# Patient Record
Sex: MALE | Race: WHITE | NOT HISPANIC OR LATINO | Employment: OTHER | URBAN - NONMETROPOLITAN AREA
[De-identification: names, ages, dates, MRNs, and addresses within clinical notes are randomized per-mention and may not be internally consistent; named-entity substitution may affect disease eponyms.]

---

## 2017-04-25 ENCOUNTER — HOSPITAL ENCOUNTER (INPATIENT)
Facility: HOSPITAL | Age: 42
LOS: 4 days | Discharge: HOME HEALTH | End: 2017-04-29
Attending: EMERGENCY MEDICINE | Admitting: HOSPITALIST

## 2017-04-25 DIAGNOSIS — L89.94: Primary | ICD-10-CM

## 2017-04-25 DIAGNOSIS — L08.9 INFECTED DECUBITUS ULCER, STAGE III (HCC): ICD-10-CM

## 2017-04-25 DIAGNOSIS — L89.93 INFECTED DECUBITUS ULCER, STAGE III (HCC): ICD-10-CM

## 2017-04-25 PROBLEM — L89.90 DECUBITUS SKIN ULCER: Status: ACTIVE | Noted: 2017-04-25

## 2017-04-25 PROBLEM — L89.90 INFECTED DECUBITUS ULCER: Status: ACTIVE | Noted: 2017-04-25

## 2017-04-25 LAB
ALBUMIN SERPL-MCNC: 4.2 G/DL (ref 3.5–5)
ALBUMIN/GLOB SERPL: 1 G/DL (ref 1.5–2.5)
ALP SERPL-CCNC: 62 U/L (ref 40–129)
ALT SERPL W P-5'-P-CCNC: 40 U/L (ref 10–44)
ANION GAP SERPL CALCULATED.3IONS-SCNC: 8.7 MMOL/L (ref 3.6–11.2)
AST SERPL-CCNC: 33 U/L (ref 10–34)
BASOPHILS # BLD AUTO: 0.03 10*3/MM3 (ref 0–0.3)
BASOPHILS NFR BLD AUTO: 0.3 % (ref 0–2)
BILIRUB SERPL-MCNC: 0.2 MG/DL (ref 0.2–1.8)
BUN BLD-MCNC: 23 MG/DL (ref 7–21)
BUN/CREAT SERPL: 31.9 (ref 7–25)
CALCIUM SPEC-SCNC: 9.6 MG/DL (ref 7.7–10)
CHLORIDE SERPL-SCNC: 112 MMOL/L (ref 99–112)
CO2 SERPL-SCNC: 23.3 MMOL/L (ref 24.3–31.9)
CREAT BLD-MCNC: 0.72 MG/DL (ref 0.43–1.29)
CRP SERPL-MCNC: 3.36 MG/DL (ref 0–0.99)
DEPRECATED RDW RBC AUTO: 45 FL (ref 37–54)
EOSINOPHIL # BLD AUTO: 0.26 10*3/MM3 (ref 0–0.7)
EOSINOPHIL NFR BLD AUTO: 3 % (ref 0–5)
ERYTHROCYTE [DISTWIDTH] IN BLOOD BY AUTOMATED COUNT: 13.3 % (ref 11.5–14.5)
ERYTHROCYTE [SEDIMENTATION RATE] IN BLOOD: 62 MM/HR (ref 0–15)
GFR SERPL CREATININE-BSD FRML MDRD: 122 ML/MIN/1.73
GLOBULIN UR ELPH-MCNC: 4.1 GM/DL
GLUCOSE BLD-MCNC: 97 MG/DL (ref 70–110)
GLUCOSE BLDC GLUCOMTR-MCNC: 75 MG/DL (ref 70–130)
HBA1C MFR BLD: 6.3 % (ref 4.5–5.7)
HCT VFR BLD AUTO: 42.6 % (ref 42–52)
HGB BLD-MCNC: 13.3 G/DL (ref 14–18)
IMM GRANULOCYTES # BLD: 0.02 10*3/MM3 (ref 0–0.03)
IMM GRANULOCYTES NFR BLD: 0.2 % (ref 0–0.5)
LYMPHOCYTES # BLD AUTO: 2.84 10*3/MM3 (ref 1–3)
LYMPHOCYTES NFR BLD AUTO: 32.9 % (ref 21–51)
MCH RBC QN AUTO: 28.9 PG (ref 27–33)
MCHC RBC AUTO-ENTMCNC: 31.2 G/DL (ref 33–37)
MCV RBC AUTO: 92.4 FL (ref 80–94)
MONOCYTES # BLD AUTO: 0.49 10*3/MM3 (ref 0.1–0.9)
MONOCYTES NFR BLD AUTO: 5.7 % (ref 0–10)
NEUTROPHILS # BLD AUTO: 4.99 10*3/MM3 (ref 1.4–6.5)
NEUTROPHILS NFR BLD AUTO: 57.9 % (ref 30–70)
NRBC BLD MANUAL-RTO: 0 /100 WBC (ref 0–0)
OSMOLALITY SERPL CALC.SUM OF ELEC: 290.4 MOSM/KG (ref 273–305)
PLATELET # BLD AUTO: 461 10*3/MM3 (ref 130–400)
PMV BLD AUTO: 10.4 FL (ref 6–10)
POTASSIUM BLD-SCNC: 3.6 MMOL/L (ref 3.5–5.3)
PROT SERPL-MCNC: 8.3 G/DL (ref 6–8)
RBC # BLD AUTO: 4.61 10*6/MM3 (ref 4.7–6.1)
SODIUM BLD-SCNC: 144 MMOL/L (ref 135–153)
WBC NRBC COR # BLD: 8.63 10*3/MM3 (ref 4.5–12.5)

## 2017-04-25 PROCEDURE — 82962 GLUCOSE BLOOD TEST: CPT

## 2017-04-25 PROCEDURE — 80053 COMPREHEN METABOLIC PANEL: CPT | Performed by: PHYSICIAN ASSISTANT

## 2017-04-25 PROCEDURE — 94799 UNLISTED PULMONARY SVC/PX: CPT

## 2017-04-25 PROCEDURE — 63710000001 INSULIN ASPART PER 5 UNITS: Performed by: HOSPITALIST

## 2017-04-25 PROCEDURE — 87070 CULTURE OTHR SPECIMN AEROBIC: CPT | Performed by: PHYSICIAN ASSISTANT

## 2017-04-25 PROCEDURE — 99284 EMERGENCY DEPT VISIT MOD MDM: CPT

## 2017-04-25 PROCEDURE — 87040 BLOOD CULTURE FOR BACTERIA: CPT | Performed by: PHYSICIAN ASSISTANT

## 2017-04-25 PROCEDURE — 83036 HEMOGLOBIN GLYCOSYLATED A1C: CPT | Performed by: HOSPITALIST

## 2017-04-25 PROCEDURE — 99223 1ST HOSP IP/OBS HIGH 75: CPT | Performed by: HOSPITALIST

## 2017-04-25 PROCEDURE — 36415 COLL VENOUS BLD VENIPUNCTURE: CPT

## 2017-04-25 PROCEDURE — 93010 ELECTROCARDIOGRAM REPORT: CPT | Performed by: INTERNAL MEDICINE

## 2017-04-25 PROCEDURE — 87205 SMEAR GRAM STAIN: CPT | Performed by: PHYSICIAN ASSISTANT

## 2017-04-25 PROCEDURE — 85652 RBC SED RATE AUTOMATED: CPT | Performed by: PHYSICIAN ASSISTANT

## 2017-04-25 PROCEDURE — 25010000002 VANCOMYCIN PER 500 MG: Performed by: PHYSICIAN ASSISTANT

## 2017-04-25 PROCEDURE — G0378 HOSPITAL OBSERVATION PER HR: HCPCS

## 2017-04-25 PROCEDURE — 96365 THER/PROPH/DIAG IV INF INIT: CPT

## 2017-04-25 PROCEDURE — 86140 C-REACTIVE PROTEIN: CPT | Performed by: PHYSICIAN ASSISTANT

## 2017-04-25 PROCEDURE — 87186 SC STD MICRODIL/AGAR DIL: CPT | Performed by: PHYSICIAN ASSISTANT

## 2017-04-25 PROCEDURE — 85025 COMPLETE CBC W/AUTO DIFF WBC: CPT | Performed by: PHYSICIAN ASSISTANT

## 2017-04-25 PROCEDURE — 87077 CULTURE AEROBIC IDENTIFY: CPT | Performed by: PHYSICIAN ASSISTANT

## 2017-04-25 PROCEDURE — 87040 BLOOD CULTURE FOR BACTERIA: CPT | Performed by: HOSPITALIST

## 2017-04-25 PROCEDURE — 93005 ELECTROCARDIOGRAM TRACING: CPT | Performed by: HOSPITALIST

## 2017-04-25 RX ORDER — ASCORBIC ACID 500 MG
250 TABLET ORAL 2 TIMES DAILY
Status: CANCELLED | OUTPATIENT
Start: 2017-04-25

## 2017-04-25 RX ORDER — SUCRALFATE 1 G/1
1 TABLET ORAL 4 TIMES DAILY
Status: DISCONTINUED | OUTPATIENT
Start: 2017-04-25 | End: 2017-04-29 | Stop reason: HOSPADM

## 2017-04-25 RX ORDER — SUCRALFATE 1 G/1
1 TABLET ORAL 4 TIMES DAILY
Status: CANCELLED | OUTPATIENT
Start: 2017-04-25

## 2017-04-25 RX ORDER — TRAZODONE HYDROCHLORIDE 50 MG/1
50 TABLET ORAL NIGHTLY
Status: CANCELLED | OUTPATIENT
Start: 2017-04-25

## 2017-04-25 RX ORDER — MULTIVIT WITH MINERALS/LUTEIN
250 TABLET ORAL 2 TIMES DAILY
COMMUNITY
End: 2017-09-21

## 2017-04-25 RX ORDER — DULOXETIN HYDROCHLORIDE 60 MG/1
60 CAPSULE, DELAYED RELEASE ORAL EVERY 12 HOURS
Status: DISCONTINUED | OUTPATIENT
Start: 2017-04-25 | End: 2017-04-29 | Stop reason: HOSPADM

## 2017-04-25 RX ORDER — ARIPIPRAZOLE 10 MG/1
10 TABLET ORAL NIGHTLY
Status: CANCELLED | OUTPATIENT
Start: 2017-04-25

## 2017-04-25 RX ORDER — METHENAMINE HIPPURATE 1000 MG/1
1 TABLET ORAL 2 TIMES DAILY
Status: CANCELLED | OUTPATIENT
Start: 2017-04-25

## 2017-04-25 RX ORDER — FENOFIBRATE 145 MG/1
145 TABLET, COATED ORAL
COMMUNITY
End: 2017-07-29

## 2017-04-25 RX ORDER — TOPIRAMATE 100 MG/1
100 TABLET, FILM COATED ORAL 3 TIMES DAILY
Status: ON HOLD | COMMUNITY
End: 2018-09-10

## 2017-04-25 RX ORDER — MODAFINIL 100 MG/1
200 TABLET ORAL DAILY
COMMUNITY
End: 2017-09-21

## 2017-04-25 RX ORDER — NITROGLYCERIN 0.4 MG/1
0.4 TABLET SUBLINGUAL
Status: DISCONTINUED | OUTPATIENT
Start: 2017-04-25 | End: 2017-04-29 | Stop reason: HOSPADM

## 2017-04-25 RX ORDER — IBUPROFEN 600 MG/1
600 TABLET ORAL 3 TIMES DAILY PRN
Status: CANCELLED | OUTPATIENT
Start: 2017-04-25

## 2017-04-25 RX ORDER — TOPIRAMATE 100 MG/1
100 TABLET, FILM COATED ORAL EVERY 8 HOURS
Status: DISCONTINUED | OUTPATIENT
Start: 2017-04-25 | End: 2017-04-29 | Stop reason: HOSPADM

## 2017-04-25 RX ORDER — FENOFIBRATE 145 MG/1
145 TABLET, COATED ORAL DAILY
Status: CANCELLED | OUTPATIENT
Start: 2017-04-26

## 2017-04-25 RX ORDER — MODAFINIL 100 MG/1
200 TABLET ORAL DAILY
Status: DISCONTINUED | OUTPATIENT
Start: 2017-04-26 | End: 2017-04-29 | Stop reason: HOSPADM

## 2017-04-25 RX ORDER — NYSTATIN 100000 U/G
1 CREAM TOPICAL 3 TIMES DAILY PRN
Status: CANCELLED | OUTPATIENT
Start: 2017-04-25

## 2017-04-25 RX ORDER — DULOXETIN HYDROCHLORIDE 60 MG/1
60 CAPSULE, DELAYED RELEASE ORAL 2 TIMES DAILY
Status: CANCELLED | OUTPATIENT
Start: 2017-04-25

## 2017-04-25 RX ORDER — METHENAMINE HIPPURATE 1000 MG/1
1 TABLET ORAL 2 TIMES DAILY
COMMUNITY
End: 2017-09-21

## 2017-04-25 RX ORDER — NYSTATIN 100000 U/G
1 CREAM TOPICAL 3 TIMES DAILY PRN
COMMUNITY
End: 2017-07-29

## 2017-04-25 RX ORDER — TOPIRAMATE 100 MG/1
100 TABLET, FILM COATED ORAL 3 TIMES DAILY
Status: CANCELLED | OUTPATIENT
Start: 2017-04-25

## 2017-04-25 RX ORDER — BACLOFEN 10 MG/1
10 TABLET ORAL 4 TIMES DAILY PRN
Status: DISCONTINUED | OUTPATIENT
Start: 2017-04-25 | End: 2017-04-29 | Stop reason: HOSPADM

## 2017-04-25 RX ORDER — LISINOPRIL 10 MG/1
10 TABLET ORAL DAILY
Status: CANCELLED | OUTPATIENT
Start: 2017-04-26

## 2017-04-25 RX ORDER — FAMOTIDINE 20 MG/1
20 TABLET, FILM COATED ORAL 2 TIMES DAILY
Status: CANCELLED | OUTPATIENT
Start: 2017-04-25

## 2017-04-25 RX ORDER — BACLOFEN 20 MG/1
30 TABLET ORAL 4 TIMES DAILY
COMMUNITY
End: 2017-12-18 | Stop reason: HOSPADM

## 2017-04-25 RX ORDER — SUCRALFATE 1 G/1
1 TABLET ORAL 4 TIMES DAILY
COMMUNITY
End: 2021-02-07

## 2017-04-25 RX ORDER — TRAZODONE HYDROCHLORIDE 50 MG/1
25 TABLET ORAL NIGHTLY PRN
Status: DISCONTINUED | OUTPATIENT
Start: 2017-04-25 | End: 2017-04-29 | Stop reason: HOSPADM

## 2017-04-25 RX ORDER — OMEPRAZOLE 40 MG/1
40 CAPSULE, DELAYED RELEASE ORAL DAILY
Status: ON HOLD | COMMUNITY
End: 2018-07-28

## 2017-04-25 RX ORDER — DULOXETIN HYDROCHLORIDE 60 MG/1
60 CAPSULE, DELAYED RELEASE ORAL 2 TIMES DAILY
Status: ON HOLD | COMMUNITY
End: 2018-07-31

## 2017-04-25 RX ORDER — SODIUM CHLORIDE 9 MG/ML
75 INJECTION, SOLUTION INTRAVENOUS CONTINUOUS
Status: DISCONTINUED | OUTPATIENT
Start: 2017-04-25 | End: 2017-04-29 | Stop reason: HOSPADM

## 2017-04-25 RX ORDER — MENTHOL AND ZINC OXIDE .44; 20.625 G/100G; G/100G
OINTMENT TOPICAL 3 TIMES DAILY PRN
Status: CANCELLED | OUTPATIENT
Start: 2017-04-25

## 2017-04-25 RX ORDER — IBUPROFEN 600 MG/1
600 TABLET ORAL 3 TIMES DAILY PRN
COMMUNITY
End: 2017-09-21

## 2017-04-25 RX ORDER — ARIPIPRAZOLE 10 MG/1
10 TABLET ORAL
COMMUNITY
End: 2017-07-29

## 2017-04-25 RX ORDER — TRAZODONE HYDROCHLORIDE 50 MG/1
50 TABLET ORAL NIGHTLY
Status: ON HOLD | COMMUNITY
End: 2017-12-05

## 2017-04-25 RX ORDER — RANITIDINE 150 MG/1
150 TABLET ORAL 2 TIMES DAILY PRN
COMMUNITY
End: 2017-07-29

## 2017-04-25 RX ORDER — PANTOPRAZOLE SODIUM 40 MG/1
40 TABLET, DELAYED RELEASE ORAL
Status: CANCELLED | OUTPATIENT
Start: 2017-04-26

## 2017-04-25 RX ORDER — NICOTINE POLACRILEX 4 MG
15 LOZENGE BUCCAL
Status: DISCONTINUED | OUTPATIENT
Start: 2017-04-25 | End: 2017-04-29 | Stop reason: HOSPADM

## 2017-04-25 RX ORDER — GABAPENTIN 400 MG/1
800 CAPSULE ORAL 3 TIMES DAILY
Status: CANCELLED | OUTPATIENT
Start: 2017-04-25

## 2017-04-25 RX ORDER — DEXTROSE MONOHYDRATE 25 G/50ML
25 INJECTION, SOLUTION INTRAVENOUS
Status: DISCONTINUED | OUTPATIENT
Start: 2017-04-25 | End: 2017-04-29 | Stop reason: HOSPADM

## 2017-04-25 RX ORDER — FAMOTIDINE 20 MG/1
20 TABLET, FILM COATED ORAL 2 TIMES DAILY
Status: DISCONTINUED | OUTPATIENT
Start: 2017-04-25 | End: 2017-04-29 | Stop reason: HOSPADM

## 2017-04-25 RX ORDER — ARIPIPRAZOLE 10 MG/1
10 TABLET ORAL DAILY
Status: DISCONTINUED | OUTPATIENT
Start: 2017-04-26 | End: 2017-04-29 | Stop reason: HOSPADM

## 2017-04-25 RX ORDER — PANTOPRAZOLE SODIUM 40 MG/1
40 TABLET, DELAYED RELEASE ORAL
Status: DISCONTINUED | OUTPATIENT
Start: 2017-04-26 | End: 2017-04-29 | Stop reason: HOSPADM

## 2017-04-25 RX ORDER — BACLOFEN 10 MG/1
30 TABLET ORAL 4 TIMES DAILY
Status: CANCELLED | OUTPATIENT
Start: 2017-04-25

## 2017-04-25 RX ORDER — NYSTATIN 100000 U/G
1 CREAM TOPICAL 3 TIMES DAILY PRN
Status: DISCONTINUED | OUTPATIENT
Start: 2017-04-25 | End: 2017-04-29 | Stop reason: HOSPADM

## 2017-04-25 RX ORDER — SODIUM CHLORIDE 0.9 % (FLUSH) 0.9 %
1-10 SYRINGE (ML) INJECTION AS NEEDED
Status: DISCONTINUED | OUTPATIENT
Start: 2017-04-25 | End: 2017-04-29 | Stop reason: HOSPADM

## 2017-04-25 RX ORDER — LISINOPRIL 10 MG/1
10 TABLET ORAL DAILY
COMMUNITY
End: 2017-07-29

## 2017-04-25 RX ORDER — MODAFINIL 100 MG/1
200 TABLET ORAL DAILY
Status: CANCELLED | OUTPATIENT
Start: 2017-04-26

## 2017-04-25 RX ORDER — SODIUM CHLORIDE 0.9 % (FLUSH) 0.9 %
10 SYRINGE (ML) INJECTION AS NEEDED
Status: DISCONTINUED | OUTPATIENT
Start: 2017-04-25 | End: 2017-04-29 | Stop reason: HOSPADM

## 2017-04-25 RX ORDER — GABAPENTIN 100 MG/1
200 CAPSULE ORAL EVERY 8 HOURS SCHEDULED
Status: DISCONTINUED | OUTPATIENT
Start: 2017-04-25 | End: 2017-04-29 | Stop reason: HOSPADM

## 2017-04-25 RX ORDER — TRAZODONE HYDROCHLORIDE 100 MG/1
100 TABLET ORAL NIGHTLY
COMMUNITY
End: 2017-04-25

## 2017-04-25 RX ORDER — ASCORBIC ACID 500 MG
250 TABLET ORAL 2 TIMES DAILY
Status: DISCONTINUED | OUTPATIENT
Start: 2017-04-25 | End: 2017-04-29 | Stop reason: HOSPADM

## 2017-04-25 RX ORDER — GABAPENTIN 800 MG/1
800 TABLET ORAL 3 TIMES DAILY
COMMUNITY

## 2017-04-25 RX ADMIN — FAMOTIDINE 20 MG: 20 TABLET, FILM COATED ORAL at 22:49

## 2017-04-25 RX ADMIN — SODIUM CHLORIDE 75 ML/HR: 9 INJECTION, SOLUTION INTRAVENOUS at 22:30

## 2017-04-25 RX ADMIN — PIPERACILLIN SODIUM,TAZOBACTAM SODIUM 3.38 G: 3; .375 INJECTION, POWDER, FOR SOLUTION INTRAVENOUS at 23:53

## 2017-04-25 RX ADMIN — TOPIRAMATE 100 MG: 100 TABLET, FILM COATED ORAL at 22:49

## 2017-04-25 RX ADMIN — VANCOMYCIN HYDROCHLORIDE 1000 MG: 5 INJECTION, POWDER, LYOPHILIZED, FOR SOLUTION INTRAVENOUS at 20:50

## 2017-04-25 RX ADMIN — SUCRALFATE 1 G: 1 TABLET ORAL at 22:49

## 2017-04-25 RX ADMIN — OXYCODONE HYDROCHLORIDE AND ACETAMINOPHEN 250 MG: 500 TABLET ORAL at 23:00

## 2017-04-25 RX ADMIN — BACLOFEN 10 MG: 10 TABLET ORAL at 23:53

## 2017-04-25 RX ADMIN — INSULIN ASPART 2 UNITS: 100 INJECTION, SOLUTION INTRAVENOUS; SUBCUTANEOUS at 23:59

## 2017-04-25 RX ADMIN — GABAPENTIN 200 MG: 100 CAPSULE ORAL at 22:49

## 2017-04-25 RX ADMIN — DULOXETINE HYDROCHLORIDE 60 MG: 60 CAPSULE, DELAYED RELEASE ORAL at 22:49

## 2017-04-26 ENCOUNTER — ANESTHESIA EVENT (OUTPATIENT)
Dept: PERIOP | Facility: HOSPITAL | Age: 42
End: 2017-04-26

## 2017-04-26 ENCOUNTER — ANESTHESIA (OUTPATIENT)
Dept: PERIOP | Facility: HOSPITAL | Age: 42
End: 2017-04-26

## 2017-04-26 LAB
ALBUMIN SERPL-MCNC: 3.8 G/DL (ref 3.5–5)
ALBUMIN/GLOB SERPL: 1 G/DL (ref 1.5–2.5)
ALP SERPL-CCNC: 69 U/L (ref 40–129)
ALT SERPL W P-5'-P-CCNC: 50 U/L (ref 10–44)
ANION GAP SERPL CALCULATED.3IONS-SCNC: 7.2 MMOL/L (ref 3.6–11.2)
AST SERPL-CCNC: 48 U/L (ref 10–34)
BASOPHILS # BLD AUTO: 0.03 10*3/MM3 (ref 0–0.3)
BASOPHILS NFR BLD AUTO: 0.4 % (ref 0–2)
BILIRUB SERPL-MCNC: 0.3 MG/DL (ref 0.2–1.8)
BUN BLD-MCNC: 20 MG/DL (ref 7–21)
BUN/CREAT SERPL: 26.3 (ref 7–25)
CALCIUM SPEC-SCNC: 8.9 MG/DL (ref 7.7–10)
CHLORIDE SERPL-SCNC: 110 MMOL/L (ref 99–112)
CO2 SERPL-SCNC: 25.8 MMOL/L (ref 24.3–31.9)
CREAT BLD-MCNC: 0.76 MG/DL (ref 0.43–1.29)
CRP SERPL-MCNC: 2.78 MG/DL (ref 0–0.99)
DEPRECATED RDW RBC AUTO: 45.6 FL (ref 37–54)
EOSINOPHIL # BLD AUTO: 0.2 10*3/MM3 (ref 0–0.7)
EOSINOPHIL NFR BLD AUTO: 2.5 % (ref 0–5)
ERYTHROCYTE [DISTWIDTH] IN BLOOD BY AUTOMATED COUNT: 13.4 % (ref 11.5–14.5)
GFR SERPL CREATININE-BSD FRML MDRD: 114 ML/MIN/1.73
GLOBULIN UR ELPH-MCNC: 3.8 GM/DL
GLUCOSE BLD-MCNC: 131 MG/DL (ref 70–110)
GLUCOSE BLDC GLUCOMTR-MCNC: 100 MG/DL (ref 70–130)
GLUCOSE BLDC GLUCOMTR-MCNC: 110 MG/DL (ref 70–130)
GLUCOSE BLDC GLUCOMTR-MCNC: 117 MG/DL (ref 70–130)
GLUCOSE BLDC GLUCOMTR-MCNC: 160 MG/DL (ref 70–130)
GLUCOSE BLDC GLUCOMTR-MCNC: 87 MG/DL (ref 70–130)
GLUCOSE BLDC GLUCOMTR-MCNC: 93 MG/DL (ref 70–130)
HCT VFR BLD AUTO: 41.2 % (ref 42–52)
HGB BLD-MCNC: 12.5 G/DL (ref 14–18)
IMM GRANULOCYTES # BLD: 0.01 10*3/MM3 (ref 0–0.03)
IMM GRANULOCYTES NFR BLD: 0.1 % (ref 0–0.5)
LYMPHOCYTES # BLD AUTO: 2.63 10*3/MM3 (ref 1–3)
LYMPHOCYTES NFR BLD AUTO: 32.6 % (ref 21–51)
MCH RBC QN AUTO: 28.3 PG (ref 27–33)
MCHC RBC AUTO-ENTMCNC: 30.3 G/DL (ref 33–37)
MCV RBC AUTO: 93.2 FL (ref 80–94)
MONOCYTES # BLD AUTO: 0.51 10*3/MM3 (ref 0.1–0.9)
MONOCYTES NFR BLD AUTO: 6.3 % (ref 0–10)
NEUTROPHILS # BLD AUTO: 4.68 10*3/MM3 (ref 1.4–6.5)
NEUTROPHILS NFR BLD AUTO: 58.1 % (ref 30–70)
NRBC BLD MANUAL-RTO: 0 /100 WBC (ref 0–0)
OSMOLALITY SERPL CALC.SUM OF ELEC: 289.4 MOSM/KG (ref 273–305)
PLATELET # BLD AUTO: 429 10*3/MM3 (ref 130–400)
PMV BLD AUTO: 10.3 FL (ref 6–10)
POTASSIUM BLD-SCNC: 3.3 MMOL/L (ref 3.5–5.3)
PROT SERPL-MCNC: 7.6 G/DL (ref 6–8)
RBC # BLD AUTO: 4.42 10*6/MM3 (ref 4.7–6.1)
SODIUM BLD-SCNC: 143 MMOL/L (ref 135–153)
WBC NRBC COR # BLD: 8.06 10*3/MM3 (ref 4.5–12.5)

## 2017-04-26 PROCEDURE — 99232 SBSQ HOSP IP/OBS MODERATE 35: CPT | Performed by: INTERNAL MEDICINE

## 2017-04-26 PROCEDURE — 82962 GLUCOSE BLOOD TEST: CPT

## 2017-04-26 PROCEDURE — 0KBN0ZZ EXCISION OF RIGHT HIP MUSCLE, OPEN APPROACH: ICD-10-PCS | Performed by: SURGERY

## 2017-04-26 PROCEDURE — 99221 1ST HOSP IP/OBS SF/LOW 40: CPT | Performed by: SURGERY

## 2017-04-26 PROCEDURE — 80053 COMPREHEN METABOLIC PANEL: CPT | Performed by: HOSPITALIST

## 2017-04-26 PROCEDURE — 25010000002 PIPERACILLIN-TAZOBACTAM: Performed by: HOSPITALIST

## 2017-04-26 PROCEDURE — 25010000002 PROPOFOL 10 MG/ML EMULSION: Performed by: NURSE ANESTHETIST, CERTIFIED REGISTERED

## 2017-04-26 PROCEDURE — 11043 DBRDMT MUSC&/FSCA 1ST 20/<: CPT | Performed by: SURGERY

## 2017-04-26 PROCEDURE — 11046 DBRDMT MUSC&/FSCA EA ADDL: CPT | Performed by: SURGERY

## 2017-04-26 PROCEDURE — 94799 UNLISTED PULMONARY SVC/PX: CPT

## 2017-04-26 PROCEDURE — 25010000002 FENTANYL CITRATE (PF) 100 MCG/2ML SOLUTION: Performed by: NURSE ANESTHETIST, CERTIFIED REGISTERED

## 2017-04-26 PROCEDURE — 25010000002 MIDAZOLAM PER 1 MG: Performed by: ANESTHESIOLOGY

## 2017-04-26 PROCEDURE — 85025 COMPLETE CBC W/AUTO DIFF WBC: CPT | Performed by: HOSPITALIST

## 2017-04-26 PROCEDURE — 25010000002 VANCOMYCIN PER 500 MG: Performed by: HOSPITALIST

## 2017-04-26 PROCEDURE — 86140 C-REACTIVE PROTEIN: CPT | Performed by: HOSPITALIST

## 2017-04-26 RX ORDER — POTASSIUM CHLORIDE 750 MG/1
40 CAPSULE, EXTENDED RELEASE ORAL AS NEEDED
Status: DISCONTINUED | OUTPATIENT
Start: 2017-04-26 | End: 2017-04-29 | Stop reason: HOSPADM

## 2017-04-26 RX ORDER — MEPERIDINE HYDROCHLORIDE 25 MG/ML
12.5 INJECTION INTRAMUSCULAR; INTRAVENOUS; SUBCUTANEOUS
Status: DISCONTINUED | OUTPATIENT
Start: 2017-04-26 | End: 2017-04-26 | Stop reason: HOSPADM

## 2017-04-26 RX ORDER — MIDAZOLAM HYDROCHLORIDE 1 MG/ML
2 INJECTION INTRAMUSCULAR; INTRAVENOUS
Status: DISCONTINUED | OUTPATIENT
Start: 2017-04-26 | End: 2017-04-26 | Stop reason: HOSPADM

## 2017-04-26 RX ORDER — METHENAMINE HIPPURATE 1000 MG/1
1 TABLET ORAL 2 TIMES DAILY
Status: DISCONTINUED | OUTPATIENT
Start: 2017-04-26 | End: 2017-04-29 | Stop reason: HOSPADM

## 2017-04-26 RX ORDER — POTASSIUM CHLORIDE 1.5 G/1.77G
40 POWDER, FOR SOLUTION ORAL AS NEEDED
Status: DISCONTINUED | OUTPATIENT
Start: 2017-04-26 | End: 2017-04-29 | Stop reason: HOSPADM

## 2017-04-26 RX ORDER — PROPOFOL 10 MG/ML
VIAL (ML) INTRAVENOUS AS NEEDED
Status: DISCONTINUED | OUTPATIENT
Start: 2017-04-26 | End: 2017-04-26 | Stop reason: SURG

## 2017-04-26 RX ORDER — SODIUM CHLORIDE, SODIUM LACTATE, POTASSIUM CHLORIDE, CALCIUM CHLORIDE 600; 310; 30; 20 MG/100ML; MG/100ML; MG/100ML; MG/100ML
125 INJECTION, SOLUTION INTRAVENOUS CONTINUOUS
Status: DISCONTINUED | OUTPATIENT
Start: 2017-04-26 | End: 2017-04-26

## 2017-04-26 RX ORDER — SODIUM CHLORIDE 0.9 % (FLUSH) 0.9 %
1-10 SYRINGE (ML) INJECTION AS NEEDED
Status: DISCONTINUED | OUTPATIENT
Start: 2017-04-26 | End: 2017-04-26 | Stop reason: HOSPADM

## 2017-04-26 RX ORDER — FENTANYL CITRATE 50 UG/ML
50 INJECTION, SOLUTION INTRAMUSCULAR; INTRAVENOUS
Status: DISCONTINUED | OUTPATIENT
Start: 2017-04-26 | End: 2017-04-26 | Stop reason: HOSPADM

## 2017-04-26 RX ORDER — POTASSIUM CHLORIDE 7.45 MG/ML
10 INJECTION INTRAVENOUS
Status: DISCONTINUED | OUTPATIENT
Start: 2017-04-26 | End: 2017-04-29 | Stop reason: HOSPADM

## 2017-04-26 RX ORDER — MIDAZOLAM HYDROCHLORIDE 1 MG/ML
1 INJECTION INTRAMUSCULAR; INTRAVENOUS
Status: DISCONTINUED | OUTPATIENT
Start: 2017-04-26 | End: 2017-04-26 | Stop reason: HOSPADM

## 2017-04-26 RX ORDER — ONDANSETRON 2 MG/ML
4 INJECTION INTRAMUSCULAR; INTRAVENOUS ONCE AS NEEDED
Status: DISCONTINUED | OUTPATIENT
Start: 2017-04-26 | End: 2017-04-26 | Stop reason: HOSPADM

## 2017-04-26 RX ORDER — OXYCODONE HYDROCHLORIDE AND ACETAMINOPHEN 5; 325 MG/1; MG/1
1 TABLET ORAL ONCE AS NEEDED
Status: DISCONTINUED | OUTPATIENT
Start: 2017-04-26 | End: 2017-04-26 | Stop reason: HOSPADM

## 2017-04-26 RX ORDER — SODIUM HYPOCHLORITE 1.25 MG/ML
SOLUTION TOPICAL EVERY 12 HOURS
Status: DISCONTINUED | OUTPATIENT
Start: 2017-04-27 | End: 2017-04-29 | Stop reason: HOSPADM

## 2017-04-26 RX ORDER — IPRATROPIUM BROMIDE AND ALBUTEROL SULFATE 2.5; .5 MG/3ML; MG/3ML
3 SOLUTION RESPIRATORY (INHALATION) ONCE AS NEEDED
Status: DISCONTINUED | OUTPATIENT
Start: 2017-04-26 | End: 2017-04-26 | Stop reason: HOSPADM

## 2017-04-26 RX ORDER — MAGNESIUM HYDROXIDE 1200 MG/15ML
LIQUID ORAL AS NEEDED
Status: DISCONTINUED | OUTPATIENT
Start: 2017-04-26 | End: 2017-04-26 | Stop reason: HOSPADM

## 2017-04-26 RX ORDER — POTASSIUM CHLORIDE 20 MEQ/1
40 TABLET, EXTENDED RELEASE ORAL EVERY 4 HOURS
Status: COMPLETED | OUTPATIENT
Start: 2017-04-26 | End: 2017-04-27

## 2017-04-26 RX ORDER — FENTANYL CITRATE 50 UG/ML
INJECTION, SOLUTION INTRAMUSCULAR; INTRAVENOUS AS NEEDED
Status: DISCONTINUED | OUTPATIENT
Start: 2017-04-26 | End: 2017-04-26 | Stop reason: SURG

## 2017-04-26 RX ADMIN — METHENAMINE HIPPURATE 1 G: 1000 TABLET ORAL at 17:21

## 2017-04-26 RX ADMIN — SUCRALFATE 1 G: 1 TABLET ORAL at 17:21

## 2017-04-26 RX ADMIN — SUCRALFATE 1 G: 1 TABLET ORAL at 20:54

## 2017-04-26 RX ADMIN — TOPIRAMATE 100 MG: 100 TABLET, FILM COATED ORAL at 22:46

## 2017-04-26 RX ADMIN — GABAPENTIN 200 MG: 100 CAPSULE ORAL at 15:20

## 2017-04-26 RX ADMIN — BACLOFEN 10 MG: 10 TABLET ORAL at 17:21

## 2017-04-26 RX ADMIN — OXYCODONE HYDROCHLORIDE AND ACETAMINOPHEN 250 MG: 500 TABLET ORAL at 17:21

## 2017-04-26 RX ADMIN — FAMOTIDINE 20 MG: 20 TABLET, FILM COATED ORAL at 17:26

## 2017-04-26 RX ADMIN — PANTOPRAZOLE SODIUM 40 MG: 40 TABLET, DELAYED RELEASE ORAL at 05:29

## 2017-04-26 RX ADMIN — FENTANYL CITRATE 50 MCG: 50 INJECTION INTRAMUSCULAR; INTRAVENOUS at 14:14

## 2017-04-26 RX ADMIN — PIPERACILLIN SODIUM,TAZOBACTAM SODIUM 3.38 G: 3; .375 INJECTION, POWDER, FOR SOLUTION INTRAVENOUS at 05:29

## 2017-04-26 RX ADMIN — DULOXETINE HYDROCHLORIDE 60 MG: 60 CAPSULE, DELAYED RELEASE ORAL at 22:46

## 2017-04-26 RX ADMIN — PROPOFOL 20 MG: 10 INJECTION, EMULSION INTRAVENOUS at 14:09

## 2017-04-26 RX ADMIN — POTASSIUM CHLORIDE 40 MEQ: 1500 TABLET, EXTENDED RELEASE ORAL at 20:54

## 2017-04-26 RX ADMIN — TOPIRAMATE 100 MG: 100 TABLET, FILM COATED ORAL at 15:20

## 2017-04-26 RX ADMIN — MIDAZOLAM HYDROCHLORIDE 2 MG: 1 INJECTION, SOLUTION INTRAMUSCULAR; INTRAVENOUS at 14:00

## 2017-04-26 RX ADMIN — VANCOMYCIN HYDROCHLORIDE 1750 MG: 5 INJECTION, POWDER, LYOPHILIZED, FOR SOLUTION INTRAVENOUS at 20:55

## 2017-04-26 RX ADMIN — VANCOMYCIN HYDROCHLORIDE 1750 MG: 5 INJECTION, POWDER, LYOPHILIZED, FOR SOLUTION INTRAVENOUS at 08:55

## 2017-04-26 RX ADMIN — FENTANYL CITRATE 100 MCG: 50 INJECTION INTRAMUSCULAR; INTRAVENOUS at 14:00

## 2017-04-26 RX ADMIN — GABAPENTIN 200 MG: 100 CAPSULE ORAL at 05:29

## 2017-04-26 RX ADMIN — PIPERACILLIN SODIUM,TAZOBACTAM SODIUM 3.38 G: 3; .375 INJECTION, POWDER, FOR SOLUTION INTRAVENOUS at 17:22

## 2017-04-26 RX ADMIN — SODIUM CHLORIDE, POTASSIUM CHLORIDE, SODIUM LACTATE AND CALCIUM CHLORIDE: 600; 310; 30; 20 INJECTION, SOLUTION INTRAVENOUS at 14:00

## 2017-04-26 RX ADMIN — TOPIRAMATE 100 MG: 100 TABLET, FILM COATED ORAL at 05:29

## 2017-04-26 RX ADMIN — GABAPENTIN 200 MG: 100 CAPSULE ORAL at 22:46

## 2017-04-26 RX ADMIN — FENTANYL CITRATE 50 MCG: 50 INJECTION INTRAMUSCULAR; INTRAVENOUS at 14:09

## 2017-04-26 RX ADMIN — PIPERACILLIN SODIUM,TAZOBACTAM SODIUM 3.38 G: 3; .375 INJECTION, POWDER, FOR SOLUTION INTRAVENOUS at 12:25

## 2017-04-26 NOTE — ANESTHESIA POSTPROCEDURE EVALUATION
Patient: Ken Krueger    Procedure Summary     Date Anesthesia Start Anesthesia Stop Room / Location    04/26/17 1400 1428  [facility] / [facility]       Procedure Diagnosis Surgeon Provider    DEBRIDEMENT ISHEAL ULCER/BUTTOCKS WOUND RT.HIP (Right Abdomen) Infected decubitus ulcer, stage III  (Infected decubitus ulcer, stage III [L89.93]) MD Ronal Shay MD          Anesthesia Type: general  Last vitals  /62 (04/26/17 1505)    Temp 97.4 °F (36.3 °C) (04/26/17 1500)    Pulse 77 (04/26/17 1505)   Resp 12 (04/26/17 1505)    SpO2 96 % (04/26/17 1505)      Post Anesthesia Care and Evaluation    Patient location during evaluation: bedside  Patient participation: complete - patient participated  Level of consciousness: awake and alert  Pain score: 1  Pain management: adequate  Airway patency: patent  Anesthetic complications: No anesthetic complications  PONV Status: none  Cardiovascular status: acceptable  Respiratory status: acceptable  Hydration status: acceptable

## 2017-04-26 NOTE — ANESTHESIA PREPROCEDURE EVALUATION
Anesthesia Evaluation     NPO Status: > 8 hours   Airway   Mallampati: III  TM distance: >3 FB  Neck ROM: full  no difficulty expected  Dental - normal exam     Pulmonary - normal exam   (+) asthma, sleep apnea,   Cardiovascular - normal exam    (+) hypertension,       Neuro/Psych  GI/Hepatic/Renal/Endo    (+) morbid obesity, diabetes mellitus,     Musculoskeletal     Abdominal  - normal exam   Substance History      OB/GYN          Other                                  Anesthesia Plan    ASA 3     general     intravenous induction   Anesthetic plan and risks discussed with patient.

## 2017-04-27 LAB
ALBUMIN SERPL-MCNC: 3.7 G/DL (ref 3.5–5)
ALBUMIN/GLOB SERPL: 1.1 G/DL (ref 1.5–2.5)
ALP SERPL-CCNC: 63 U/L (ref 40–129)
ALT SERPL W P-5'-P-CCNC: 46 U/L (ref 10–44)
ANION GAP SERPL CALCULATED.3IONS-SCNC: 4.5 MMOL/L (ref 3.6–11.2)
AST SERPL-CCNC: 29 U/L (ref 10–34)
BASOPHILS # BLD AUTO: 0.03 10*3/MM3 (ref 0–0.3)
BASOPHILS NFR BLD AUTO: 0.3 % (ref 0–2)
BILIRUB SERPL-MCNC: 0.3 MG/DL (ref 0.2–1.8)
BUN BLD-MCNC: 15 MG/DL (ref 7–21)
BUN/CREAT SERPL: 22.7 (ref 7–25)
CALCIUM SPEC-SCNC: 8.6 MG/DL (ref 7.7–10)
CHLORIDE SERPL-SCNC: 112 MMOL/L (ref 99–112)
CO2 SERPL-SCNC: 24.5 MMOL/L (ref 24.3–31.9)
CREAT BLD-MCNC: 0.66 MG/DL (ref 0.43–1.29)
CRP SERPL-MCNC: 2.36 MG/DL (ref 0–0.99)
DEPRECATED RDW RBC AUTO: 43.9 FL (ref 37–54)
EOSINOPHIL # BLD AUTO: 0.29 10*3/MM3 (ref 0–0.7)
EOSINOPHIL NFR BLD AUTO: 3.2 % (ref 0–5)
ERYTHROCYTE [DISTWIDTH] IN BLOOD BY AUTOMATED COUNT: 13.4 % (ref 11.5–14.5)
GFR SERPL CREATININE-BSD FRML MDRD: 134 ML/MIN/1.73
GLOBULIN UR ELPH-MCNC: 3.5 GM/DL
GLUCOSE BLD-MCNC: 94 MG/DL (ref 70–110)
GLUCOSE BLDC GLUCOMTR-MCNC: 103 MG/DL (ref 70–130)
GLUCOSE BLDC GLUCOMTR-MCNC: 110 MG/DL (ref 70–130)
GLUCOSE BLDC GLUCOMTR-MCNC: 119 MG/DL (ref 70–130)
GLUCOSE BLDC GLUCOMTR-MCNC: 144 MG/DL (ref 70–130)
HCT VFR BLD AUTO: 39.7 % (ref 42–52)
HGB BLD-MCNC: 12 G/DL (ref 14–18)
IMM GRANULOCYTES # BLD: 0.02 10*3/MM3 (ref 0–0.03)
IMM GRANULOCYTES NFR BLD: 0.2 % (ref 0–0.5)
LYMPHOCYTES # BLD AUTO: 3.18 10*3/MM3 (ref 1–3)
LYMPHOCYTES NFR BLD AUTO: 35.1 % (ref 21–51)
MAGNESIUM SERPL-MCNC: 1.8 MG/DL (ref 1.7–2.6)
MCH RBC QN AUTO: 28.3 PG (ref 27–33)
MCHC RBC AUTO-ENTMCNC: 30.2 G/DL (ref 33–37)
MCV RBC AUTO: 93.6 FL (ref 80–94)
MONOCYTES # BLD AUTO: 0.46 10*3/MM3 (ref 0.1–0.9)
MONOCYTES NFR BLD AUTO: 5.1 % (ref 0–10)
NEUTROPHILS # BLD AUTO: 5.07 10*3/MM3 (ref 1.4–6.5)
NEUTROPHILS NFR BLD AUTO: 56.1 % (ref 30–70)
OSMOLALITY SERPL CALC.SUM OF ELEC: 281.8 MOSM/KG (ref 273–305)
PLATELET # BLD AUTO: 450 10*3/MM3 (ref 130–400)
PMV BLD AUTO: 10.1 FL (ref 6–10)
POTASSIUM BLD-SCNC: 3.6 MMOL/L (ref 3.5–5.3)
POTASSIUM BLD-SCNC: 3.9 MMOL/L (ref 3.5–5.3)
PROT SERPL-MCNC: 7.2 G/DL (ref 6–8)
RBC # BLD AUTO: 4.24 10*6/MM3 (ref 4.7–6.1)
SODIUM BLD-SCNC: 141 MMOL/L (ref 135–153)
WBC NRBC COR # BLD: 9.05 10*3/MM3 (ref 4.5–12.5)

## 2017-04-27 PROCEDURE — 25010000002 ENOXAPARIN PER 10 MG: Performed by: SURGERY

## 2017-04-27 PROCEDURE — 82962 GLUCOSE BLOOD TEST: CPT

## 2017-04-27 PROCEDURE — 83735 ASSAY OF MAGNESIUM: CPT | Performed by: INTERNAL MEDICINE

## 2017-04-27 PROCEDURE — 80053 COMPREHEN METABOLIC PANEL: CPT | Performed by: INTERNAL MEDICINE

## 2017-04-27 PROCEDURE — 25010000002 PIPERACILLIN-TAZOBACTAM: Performed by: HOSPITALIST

## 2017-04-27 PROCEDURE — 25010000002 VANCOMYCIN PER 500 MG: Performed by: HOSPITALIST

## 2017-04-27 PROCEDURE — 99232 SBSQ HOSP IP/OBS MODERATE 35: CPT | Performed by: INTERNAL MEDICINE

## 2017-04-27 PROCEDURE — 84132 ASSAY OF SERUM POTASSIUM: CPT | Performed by: SURGERY

## 2017-04-27 PROCEDURE — 86140 C-REACTIVE PROTEIN: CPT | Performed by: INTERNAL MEDICINE

## 2017-04-27 PROCEDURE — 85025 COMPLETE CBC W/AUTO DIFF WBC: CPT | Performed by: INTERNAL MEDICINE

## 2017-04-27 PROCEDURE — 94799 UNLISTED PULMONARY SVC/PX: CPT

## 2017-04-27 RX ADMIN — GABAPENTIN 200 MG: 100 CAPSULE ORAL at 23:00

## 2017-04-27 RX ADMIN — FAMOTIDINE 20 MG: 20 TABLET, FILM COATED ORAL at 17:19

## 2017-04-27 RX ADMIN — SUCRALFATE 1 G: 1 TABLET ORAL at 17:19

## 2017-04-27 RX ADMIN — BACLOFEN 10 MG: 10 TABLET ORAL at 10:47

## 2017-04-27 RX ADMIN — TOPIRAMATE 100 MG: 100 TABLET, FILM COATED ORAL at 06:05

## 2017-04-27 RX ADMIN — SUCRALFATE 1 G: 1 TABLET ORAL at 08:28

## 2017-04-27 RX ADMIN — PIPERACILLIN SODIUM,TAZOBACTAM SODIUM 3.38 G: 3; .375 INJECTION, POWDER, FOR SOLUTION INTRAVENOUS at 01:11

## 2017-04-27 RX ADMIN — OXYCODONE HYDROCHLORIDE AND ACETAMINOPHEN 250 MG: 500 TABLET ORAL at 17:19

## 2017-04-27 RX ADMIN — SODIUM CHLORIDE 75 ML/HR: 9 INJECTION, SOLUTION INTRAVENOUS at 02:09

## 2017-04-27 RX ADMIN — PIPERACILLIN SODIUM,TAZOBACTAM SODIUM 3.38 G: 3; .375 INJECTION, POWDER, FOR SOLUTION INTRAVENOUS at 06:05

## 2017-04-27 RX ADMIN — PANTOPRAZOLE SODIUM 40 MG: 40 TABLET, DELAYED RELEASE ORAL at 06:05

## 2017-04-27 RX ADMIN — GABAPENTIN 200 MG: 100 CAPSULE ORAL at 14:46

## 2017-04-27 RX ADMIN — TOPIRAMATE 100 MG: 100 TABLET, FILM COATED ORAL at 23:00

## 2017-04-27 RX ADMIN — DULOXETINE HYDROCHLORIDE 60 MG: 60 CAPSULE, DELAYED RELEASE ORAL at 23:00

## 2017-04-27 RX ADMIN — ENOXAPARIN SODIUM 40 MG: 40 INJECTION SUBCUTANEOUS at 08:28

## 2017-04-27 RX ADMIN — METHENAMINE HIPPURATE 1 G: 1000 TABLET ORAL at 08:28

## 2017-04-27 RX ADMIN — ARIPIPRAZOLE 10 MG: 10 TABLET ORAL at 08:28

## 2017-04-27 RX ADMIN — SUCRALFATE 1 G: 1 TABLET ORAL at 20:23

## 2017-04-27 RX ADMIN — POTASSIUM CHLORIDE 40 MEQ: 1500 TABLET, EXTENDED RELEASE ORAL at 01:11

## 2017-04-27 RX ADMIN — MODAFINIL 200 MG: 100 TABLET ORAL at 08:28

## 2017-04-27 RX ADMIN — CHOLECALCIFEROL CAP 125 MCG (5000 UNIT) 5000 UNITS: 125 CAP at 08:28

## 2017-04-27 RX ADMIN — PIPERACILLIN SODIUM,TAZOBACTAM SODIUM 3.38 G: 3; .375 INJECTION, POWDER, FOR SOLUTION INTRAVENOUS at 12:19

## 2017-04-27 RX ADMIN — SODIUM CHLORIDE 75 ML/HR: 9 INJECTION, SOLUTION INTRAVENOUS at 17:19

## 2017-04-27 RX ADMIN — SODIUM HYPOCHLORITE: 1.25 SOLUTION TOPICAL at 10:34

## 2017-04-27 RX ADMIN — METHENAMINE HIPPURATE 1 G: 1000 TABLET ORAL at 17:19

## 2017-04-27 RX ADMIN — VANCOMYCIN HYDROCHLORIDE 1750 MG: 5 INJECTION, POWDER, LYOPHILIZED, FOR SOLUTION INTRAVENOUS at 08:30

## 2017-04-27 RX ADMIN — GABAPENTIN 200 MG: 100 CAPSULE ORAL at 06:05

## 2017-04-27 RX ADMIN — OXYCODONE HYDROCHLORIDE AND ACETAMINOPHEN 250 MG: 500 TABLET ORAL at 08:28

## 2017-04-27 RX ADMIN — PIPERACILLIN SODIUM,TAZOBACTAM SODIUM 3.38 G: 3; .375 INJECTION, POWDER, FOR SOLUTION INTRAVENOUS at 17:19

## 2017-04-27 RX ADMIN — SUCRALFATE 1 G: 1 TABLET ORAL at 12:19

## 2017-04-27 RX ADMIN — VANCOMYCIN HYDROCHLORIDE 1750 MG: 5 INJECTION, POWDER, LYOPHILIZED, FOR SOLUTION INTRAVENOUS at 20:23

## 2017-04-27 RX ADMIN — FAMOTIDINE 20 MG: 20 TABLET, FILM COATED ORAL at 08:28

## 2017-04-27 RX ADMIN — DULOXETINE HYDROCHLORIDE 60 MG: 60 CAPSULE, DELAYED RELEASE ORAL at 10:47

## 2017-04-27 RX ADMIN — TOPIRAMATE 100 MG: 100 TABLET, FILM COATED ORAL at 14:46

## 2017-04-28 LAB
ALBUMIN SERPL-MCNC: 3.4 G/DL (ref 3.5–5)
ALBUMIN/GLOB SERPL: 1 G/DL (ref 1.5–2.5)
ALP SERPL-CCNC: 56 U/L (ref 40–129)
ALT SERPL W P-5'-P-CCNC: 30 U/L (ref 10–44)
ANION GAP SERPL CALCULATED.3IONS-SCNC: 4.1 MMOL/L (ref 3.6–11.2)
AST SERPL-CCNC: 21 U/L (ref 10–34)
BACTERIA SPEC AEROBE CULT: ABNORMAL
BACTERIA SPEC AEROBE CULT: ABNORMAL
BASOPHILS # BLD AUTO: 0.01 10*3/MM3 (ref 0–0.3)
BASOPHILS NFR BLD AUTO: 0.1 % (ref 0–2)
BILIRUB SERPL-MCNC: 0.3 MG/DL (ref 0.2–1.8)
BUN BLD-MCNC: 12 MG/DL (ref 7–21)
BUN/CREAT SERPL: 16 (ref 7–25)
CALCIUM SPEC-SCNC: 8.6 MG/DL (ref 7.7–10)
CHLORIDE SERPL-SCNC: 115 MMOL/L (ref 99–112)
CO2 SERPL-SCNC: 24.9 MMOL/L (ref 24.3–31.9)
CREAT BLD-MCNC: 0.75 MG/DL (ref 0.43–1.29)
CRP SERPL-MCNC: 1.84 MG/DL (ref 0–0.99)
DEPRECATED RDW RBC AUTO: 45.8 FL (ref 37–54)
EOSINOPHIL # BLD AUTO: 0.25 10*3/MM3 (ref 0–0.7)
EOSINOPHIL NFR BLD AUTO: 3.3 % (ref 0–5)
ERYTHROCYTE [DISTWIDTH] IN BLOOD BY AUTOMATED COUNT: 13.4 % (ref 11.5–14.5)
GFR SERPL CREATININE-BSD FRML MDRD: 116 ML/MIN/1.73
GLOBULIN UR ELPH-MCNC: 3.5 GM/DL
GLUCOSE BLD-MCNC: 116 MG/DL (ref 70–110)
GLUCOSE BLDC GLUCOMTR-MCNC: 106 MG/DL (ref 70–130)
GLUCOSE BLDC GLUCOMTR-MCNC: 116 MG/DL (ref 70–130)
GLUCOSE BLDC GLUCOMTR-MCNC: 187 MG/DL (ref 70–130)
GLUCOSE BLDC GLUCOMTR-MCNC: 225 MG/DL (ref 70–130)
GRAM STN SPEC: ABNORMAL
HCT VFR BLD AUTO: 39.6 % (ref 42–52)
HGB BLD-MCNC: 11.9 G/DL (ref 14–18)
IMM GRANULOCYTES # BLD: 0.02 10*3/MM3 (ref 0–0.03)
IMM GRANULOCYTES NFR BLD: 0.3 % (ref 0–0.5)
LYMPHOCYTES # BLD AUTO: 2.58 10*3/MM3 (ref 1–3)
LYMPHOCYTES NFR BLD AUTO: 33.8 % (ref 21–51)
MAGNESIUM SERPL-MCNC: 1.9 MG/DL (ref 1.7–2.6)
MCH RBC QN AUTO: 28.2 PG (ref 27–33)
MCHC RBC AUTO-ENTMCNC: 30.1 G/DL (ref 33–37)
MCV RBC AUTO: 93.8 FL (ref 80–94)
MONOCYTES # BLD AUTO: 0.3 10*3/MM3 (ref 0.1–0.9)
MONOCYTES NFR BLD AUTO: 3.9 % (ref 0–10)
NEUTROPHILS # BLD AUTO: 4.47 10*3/MM3 (ref 1.4–6.5)
NEUTROPHILS NFR BLD AUTO: 58.6 % (ref 30–70)
OSMOLALITY SERPL CALC.SUM OF ELEC: 287.6 MOSM/KG (ref 273–305)
PLATELET # BLD AUTO: 389 10*3/MM3 (ref 130–400)
PMV BLD AUTO: 10.3 FL (ref 6–10)
POTASSIUM BLD-SCNC: 3.6 MMOL/L (ref 3.5–5.3)
PROT SERPL-MCNC: 6.9 G/DL (ref 6–8)
RBC # BLD AUTO: 4.22 10*6/MM3 (ref 4.7–6.1)
SODIUM BLD-SCNC: 144 MMOL/L (ref 135–153)
VANCOMYCIN TROUGH SERPL-MCNC: 13.4 MCG/ML (ref 5–15)
WBC NRBC COR # BLD: 7.63 10*3/MM3 (ref 4.5–12.5)

## 2017-04-28 PROCEDURE — 63710000001 INSULIN ASPART PER 5 UNITS: Performed by: HOSPITALIST

## 2017-04-28 PROCEDURE — 82962 GLUCOSE BLOOD TEST: CPT

## 2017-04-28 PROCEDURE — 94799 UNLISTED PULMONARY SVC/PX: CPT

## 2017-04-28 PROCEDURE — 80202 ASSAY OF VANCOMYCIN: CPT

## 2017-04-28 PROCEDURE — 86140 C-REACTIVE PROTEIN: CPT | Performed by: INTERNAL MEDICINE

## 2017-04-28 PROCEDURE — 25010000002 VANCOMYCIN PER 500 MG

## 2017-04-28 PROCEDURE — 25010000002 VANCOMYCIN PER 500 MG: Performed by: HOSPITALIST

## 2017-04-28 PROCEDURE — 99232 SBSQ HOSP IP/OBS MODERATE 35: CPT | Performed by: INTERNAL MEDICINE

## 2017-04-28 PROCEDURE — 85025 COMPLETE CBC W/AUTO DIFF WBC: CPT | Performed by: INTERNAL MEDICINE

## 2017-04-28 PROCEDURE — 83735 ASSAY OF MAGNESIUM: CPT | Performed by: INTERNAL MEDICINE

## 2017-04-28 PROCEDURE — 25010000002 PIPERACILLIN-TAZOBACTAM: Performed by: HOSPITALIST

## 2017-04-28 PROCEDURE — 25010000002 ENOXAPARIN PER 10 MG: Performed by: SURGERY

## 2017-04-28 PROCEDURE — 80053 COMPREHEN METABOLIC PANEL: CPT | Performed by: INTERNAL MEDICINE

## 2017-04-28 RX ORDER — ACETAMINOPHEN 325 MG/1
500 TABLET ORAL ONCE
Status: COMPLETED | OUTPATIENT
Start: 2017-04-28 | End: 2017-04-28

## 2017-04-28 RX ADMIN — GABAPENTIN 200 MG: 100 CAPSULE ORAL at 05:15

## 2017-04-28 RX ADMIN — PANTOPRAZOLE SODIUM 40 MG: 40 TABLET, DELAYED RELEASE ORAL at 05:15

## 2017-04-28 RX ADMIN — SUCRALFATE 1 G: 1 TABLET ORAL at 17:10

## 2017-04-28 RX ADMIN — PIPERACILLIN SODIUM,TAZOBACTAM SODIUM 3.38 G: 3; .375 INJECTION, POWDER, FOR SOLUTION INTRAVENOUS at 05:51

## 2017-04-28 RX ADMIN — FAMOTIDINE 20 MG: 20 TABLET, FILM COATED ORAL at 08:15

## 2017-04-28 RX ADMIN — ACETAMINOPHEN 487.5 MG: 325 TABLET ORAL at 01:28

## 2017-04-28 RX ADMIN — BACLOFEN 10 MG: 10 TABLET ORAL at 08:23

## 2017-04-28 RX ADMIN — SODIUM HYPOCHLORITE: 1.25 SOLUTION TOPICAL at 21:00

## 2017-04-28 RX ADMIN — MODAFINIL 200 MG: 100 TABLET ORAL at 08:15

## 2017-04-28 RX ADMIN — BACLOFEN 10 MG: 10 TABLET ORAL at 14:39

## 2017-04-28 RX ADMIN — INSULIN ASPART 2 UNITS: 100 INJECTION, SOLUTION INTRAVENOUS; SUBCUTANEOUS at 17:08

## 2017-04-28 RX ADMIN — INSULIN ASPART 3 UNITS: 100 INJECTION, SOLUTION INTRAVENOUS; SUBCUTANEOUS at 21:04

## 2017-04-28 RX ADMIN — OXYCODONE HYDROCHLORIDE AND ACETAMINOPHEN 250 MG: 500 TABLET ORAL at 17:10

## 2017-04-28 RX ADMIN — PIPERACILLIN SODIUM,TAZOBACTAM SODIUM 3.38 G: 3; .375 INJECTION, POWDER, FOR SOLUTION INTRAVENOUS at 23:33

## 2017-04-28 RX ADMIN — DULOXETINE HYDROCHLORIDE 60 MG: 60 CAPSULE, DELAYED RELEASE ORAL at 21:03

## 2017-04-28 RX ADMIN — GABAPENTIN 200 MG: 100 CAPSULE ORAL at 20:54

## 2017-04-28 RX ADMIN — SUCRALFATE 1 G: 1 TABLET ORAL at 20:54

## 2017-04-28 RX ADMIN — METHENAMINE HIPPURATE 1 G: 1000 TABLET ORAL at 08:14

## 2017-04-28 RX ADMIN — ENOXAPARIN SODIUM 40 MG: 40 INJECTION SUBCUTANEOUS at 08:16

## 2017-04-28 RX ADMIN — GABAPENTIN 200 MG: 100 CAPSULE ORAL at 14:38

## 2017-04-28 RX ADMIN — TOPIRAMATE 100 MG: 100 TABLET, FILM COATED ORAL at 05:27

## 2017-04-28 RX ADMIN — SODIUM HYPOCHLORITE 1 ML: 1.25 SOLUTION TOPICAL at 12:23

## 2017-04-28 RX ADMIN — CHOLECALCIFEROL CAP 125 MCG (5000 UNIT) 5000 UNITS: 125 CAP at 08:14

## 2017-04-28 RX ADMIN — VANCOMYCIN HYDROCHLORIDE 1750 MG: 5 INJECTION, POWDER, LYOPHILIZED, FOR SOLUTION INTRAVENOUS at 08:18

## 2017-04-28 RX ADMIN — PIPERACILLIN SODIUM,TAZOBACTAM SODIUM 3.38 G: 3; .375 INJECTION, POWDER, FOR SOLUTION INTRAVENOUS at 18:57

## 2017-04-28 RX ADMIN — TOPIRAMATE 100 MG: 100 TABLET, FILM COATED ORAL at 15:42

## 2017-04-28 RX ADMIN — TOPIRAMATE 100 MG: 100 TABLET, FILM COATED ORAL at 21:03

## 2017-04-28 RX ADMIN — FAMOTIDINE 20 MG: 20 TABLET, FILM COATED ORAL at 17:10

## 2017-04-28 RX ADMIN — SUCRALFATE 1 G: 1 TABLET ORAL at 12:07

## 2017-04-28 RX ADMIN — PIPERACILLIN SODIUM,TAZOBACTAM SODIUM 3.38 G: 3; .375 INJECTION, POWDER, FOR SOLUTION INTRAVENOUS at 13:01

## 2017-04-28 RX ADMIN — METHENAMINE HIPPURATE 1 G: 1000 TABLET ORAL at 17:11

## 2017-04-28 RX ADMIN — ARIPIPRAZOLE 10 MG: 10 TABLET ORAL at 08:15

## 2017-04-28 RX ADMIN — DULOXETINE HYDROCHLORIDE 60 MG: 60 CAPSULE, DELAYED RELEASE ORAL at 12:07

## 2017-04-28 RX ADMIN — OXYCODONE HYDROCHLORIDE AND ACETAMINOPHEN 250 MG: 500 TABLET ORAL at 08:15

## 2017-04-28 RX ADMIN — PIPERACILLIN SODIUM,TAZOBACTAM SODIUM 3.38 G: 3; .375 INJECTION, POWDER, FOR SOLUTION INTRAVENOUS at 00:00

## 2017-04-28 RX ADMIN — SUCRALFATE 1 G: 1 TABLET ORAL at 08:14

## 2017-04-28 RX ADMIN — VANCOMYCIN HYDROCHLORIDE 2000 MG: 5 INJECTION, POWDER, LYOPHILIZED, FOR SOLUTION INTRAVENOUS at 20:54

## 2017-04-29 VITALS
OXYGEN SATURATION: 96 % | BODY MASS INDEX: 36.98 KG/M2 | SYSTOLIC BLOOD PRESSURE: 143 MMHG | RESPIRATION RATE: 18 BRPM | HEIGHT: 72 IN | WEIGHT: 273 LBS | HEART RATE: 59 BPM | TEMPERATURE: 98.9 F | DIASTOLIC BLOOD PRESSURE: 86 MMHG

## 2017-04-29 LAB
ALBUMIN SERPL-MCNC: 3.4 G/DL (ref 3.5–5)
ALBUMIN/GLOB SERPL: 1 G/DL (ref 1.5–2.5)
ALP SERPL-CCNC: 52 U/L (ref 40–129)
ALT SERPL W P-5'-P-CCNC: 28 U/L (ref 10–44)
ANION GAP SERPL CALCULATED.3IONS-SCNC: 1.2 MMOL/L (ref 3.6–11.2)
AST SERPL-CCNC: 16 U/L (ref 10–34)
BASOPHILS # BLD AUTO: 0.02 10*3/MM3 (ref 0–0.3)
BASOPHILS NFR BLD AUTO: 0.2 % (ref 0–2)
BILIRUB SERPL-MCNC: 0.3 MG/DL (ref 0.2–1.8)
BUN BLD-MCNC: 10 MG/DL (ref 7–21)
BUN/CREAT SERPL: 13.9 (ref 7–25)
CALCIUM SPEC-SCNC: 8.6 MG/DL (ref 7.7–10)
CHLORIDE SERPL-SCNC: 116 MMOL/L (ref 99–112)
CO2 SERPL-SCNC: 24.8 MMOL/L (ref 24.3–31.9)
CREAT BLD-MCNC: 0.72 MG/DL (ref 0.43–1.29)
CRP SERPL-MCNC: 2.13 MG/DL (ref 0–0.99)
DEPRECATED RDW RBC AUTO: 44.1 FL (ref 37–54)
EOSINOPHIL # BLD AUTO: 0.36 10*3/MM3 (ref 0–0.7)
EOSINOPHIL NFR BLD AUTO: 3.8 % (ref 0–5)
ERYTHROCYTE [DISTWIDTH] IN BLOOD BY AUTOMATED COUNT: 13.2 % (ref 11.5–14.5)
GFR SERPL CREATININE-BSD FRML MDRD: 122 ML/MIN/1.73
GLOBULIN UR ELPH-MCNC: 3.5 GM/DL
GLUCOSE BLD-MCNC: 90 MG/DL (ref 70–110)
GLUCOSE BLDC GLUCOMTR-MCNC: 109 MG/DL (ref 70–130)
GLUCOSE BLDC GLUCOMTR-MCNC: 126 MG/DL (ref 70–130)
GLUCOSE BLDC GLUCOMTR-MCNC: 87 MG/DL (ref 70–130)
GLUCOSE BLDC GLUCOMTR-MCNC: 95 MG/DL (ref 70–130)
HCT VFR BLD AUTO: 38.1 % (ref 42–52)
HGB BLD-MCNC: 11.4 G/DL (ref 14–18)
IMM GRANULOCYTES # BLD: 0.02 10*3/MM3 (ref 0–0.03)
IMM GRANULOCYTES NFR BLD: 0.2 % (ref 0–0.5)
LYMPHOCYTES # BLD AUTO: 2.79 10*3/MM3 (ref 1–3)
LYMPHOCYTES NFR BLD AUTO: 29.1 % (ref 21–51)
MAGNESIUM SERPL-MCNC: 1.8 MG/DL (ref 1.7–2.6)
MCH RBC QN AUTO: 28.3 PG (ref 27–33)
MCHC RBC AUTO-ENTMCNC: 29.9 G/DL (ref 33–37)
MCV RBC AUTO: 94.5 FL (ref 80–94)
MONOCYTES # BLD AUTO: 0.33 10*3/MM3 (ref 0.1–0.9)
MONOCYTES NFR BLD AUTO: 3.4 % (ref 0–10)
NEUTROPHILS # BLD AUTO: 6.06 10*3/MM3 (ref 1.4–6.5)
NEUTROPHILS NFR BLD AUTO: 63.3 % (ref 30–70)
OSMOLALITY SERPL CALC.SUM OF ELEC: 281.7 MOSM/KG (ref 273–305)
PLATELET # BLD AUTO: 397 10*3/MM3 (ref 130–400)
PMV BLD AUTO: 10 FL (ref 6–10)
POTASSIUM BLD-SCNC: 3.5 MMOL/L (ref 3.5–5.3)
PROT SERPL-MCNC: 6.9 G/DL (ref 6–8)
RBC # BLD AUTO: 4.03 10*6/MM3 (ref 4.7–6.1)
SODIUM BLD-SCNC: 142 MMOL/L (ref 135–153)
WBC NRBC COR # BLD: 9.58 10*3/MM3 (ref 4.5–12.5)

## 2017-04-29 PROCEDURE — 93005 ELECTROCARDIOGRAM TRACING: CPT | Performed by: INTERNAL MEDICINE

## 2017-04-29 PROCEDURE — 86140 C-REACTIVE PROTEIN: CPT | Performed by: PHYSICIAN ASSISTANT

## 2017-04-29 PROCEDURE — 85025 COMPLETE CBC W/AUTO DIFF WBC: CPT | Performed by: PHYSICIAN ASSISTANT

## 2017-04-29 PROCEDURE — 93010 ELECTROCARDIOGRAM REPORT: CPT | Performed by: INTERNAL MEDICINE

## 2017-04-29 PROCEDURE — 25010000002 PIPERACILLIN-TAZOBACTAM: Performed by: HOSPITALIST

## 2017-04-29 PROCEDURE — 80053 COMPREHEN METABOLIC PANEL: CPT | Performed by: PHYSICIAN ASSISTANT

## 2017-04-29 PROCEDURE — 83735 ASSAY OF MAGNESIUM: CPT | Performed by: PHYSICIAN ASSISTANT

## 2017-04-29 PROCEDURE — 94799 UNLISTED PULMONARY SVC/PX: CPT

## 2017-04-29 PROCEDURE — 25010000002 VANCOMYCIN PER 500 MG

## 2017-04-29 PROCEDURE — 82962 GLUCOSE BLOOD TEST: CPT

## 2017-04-29 PROCEDURE — 25010000002 ENOXAPARIN PER 10 MG: Performed by: SURGERY

## 2017-04-29 PROCEDURE — 99239 HOSP IP/OBS DSCHRG MGMT >30: CPT | Performed by: INTERNAL MEDICINE

## 2017-04-29 RX ORDER — LEVOFLOXACIN 500 MG/1
500 TABLET, FILM COATED ORAL DAILY
Qty: 14 TABLET | Refills: 0 | Status: SHIPPED | OUTPATIENT
Start: 2017-04-29 | End: 2017-05-13

## 2017-04-29 RX ORDER — DOXYCYCLINE 50 MG/1
100 CAPSULE ORAL 2 TIMES DAILY
Qty: 56 CAPSULE | Refills: 0 | Status: SHIPPED | OUTPATIENT
Start: 2017-04-29 | End: 2017-05-13

## 2017-04-29 RX ADMIN — Medication 1 PACKAGE: at 17:13

## 2017-04-29 RX ADMIN — CHOLECALCIFEROL CAP 125 MCG (5000 UNIT) 5000 UNITS: 125 CAP at 09:08

## 2017-04-29 RX ADMIN — PIPERACILLIN SODIUM,TAZOBACTAM SODIUM 3.38 G: 3; .375 INJECTION, POWDER, FOR SOLUTION INTRAVENOUS at 17:08

## 2017-04-29 RX ADMIN — GABAPENTIN 200 MG: 100 CAPSULE ORAL at 04:24

## 2017-04-29 RX ADMIN — FAMOTIDINE 20 MG: 20 TABLET, FILM COATED ORAL at 09:08

## 2017-04-29 RX ADMIN — BACLOFEN 10 MG: 10 TABLET ORAL at 14:23

## 2017-04-29 RX ADMIN — BACLOFEN 10 MG: 10 TABLET ORAL at 04:23

## 2017-04-29 RX ADMIN — ENOXAPARIN SODIUM 40 MG: 40 INJECTION SUBCUTANEOUS at 09:10

## 2017-04-29 RX ADMIN — METHENAMINE HIPPURATE 1 G: 1000 TABLET ORAL at 17:06

## 2017-04-29 RX ADMIN — METHENAMINE HIPPURATE 1 G: 1000 TABLET ORAL at 09:10

## 2017-04-29 RX ADMIN — SUCRALFATE 1 G: 1 TABLET ORAL at 11:49

## 2017-04-29 RX ADMIN — PIPERACILLIN SODIUM,TAZOBACTAM SODIUM 3.38 G: 3; .375 INJECTION, POWDER, FOR SOLUTION INTRAVENOUS at 04:23

## 2017-04-29 RX ADMIN — OXYCODONE HYDROCHLORIDE AND ACETAMINOPHEN 250 MG: 500 TABLET ORAL at 09:08

## 2017-04-29 RX ADMIN — SUCRALFATE 1 G: 1 TABLET ORAL at 17:10

## 2017-04-29 RX ADMIN — VANCOMYCIN HYDROCHLORIDE 2000 MG: 5 INJECTION, POWDER, LYOPHILIZED, FOR SOLUTION INTRAVENOUS at 09:15

## 2017-04-29 RX ADMIN — PANTOPRAZOLE SODIUM 40 MG: 40 TABLET, DELAYED RELEASE ORAL at 04:23

## 2017-04-29 RX ADMIN — TOPIRAMATE 100 MG: 100 TABLET, FILM COATED ORAL at 04:23

## 2017-04-29 RX ADMIN — FAMOTIDINE 20 MG: 20 TABLET, FILM COATED ORAL at 17:10

## 2017-04-29 RX ADMIN — TOPIRAMATE 100 MG: 100 TABLET, FILM COATED ORAL at 14:22

## 2017-04-29 RX ADMIN — SODIUM HYPOCHLORITE 1 ML: 1.25 SOLUTION TOPICAL at 09:12

## 2017-04-29 RX ADMIN — GABAPENTIN 200 MG: 100 CAPSULE ORAL at 14:22

## 2017-04-29 RX ADMIN — DULOXETINE HYDROCHLORIDE 60 MG: 60 CAPSULE, DELAYED RELEASE ORAL at 11:45

## 2017-04-29 RX ADMIN — SUCRALFATE 1 G: 1 TABLET ORAL at 07:55

## 2017-04-29 RX ADMIN — PIPERACILLIN SODIUM,TAZOBACTAM SODIUM 3.38 G: 3; .375 INJECTION, POWDER, FOR SOLUTION INTRAVENOUS at 12:47

## 2017-04-29 RX ADMIN — ARIPIPRAZOLE 10 MG: 10 TABLET ORAL at 09:08

## 2017-04-29 RX ADMIN — MODAFINIL 200 MG: 100 TABLET ORAL at 09:08

## 2017-04-29 RX ADMIN — OXYCODONE HYDROCHLORIDE AND ACETAMINOPHEN 250 MG: 500 TABLET ORAL at 17:09

## 2017-04-30 LAB
BACTERIA SPEC AEROBE CULT: NORMAL
BACTERIA SPEC AEROBE CULT: NORMAL

## 2017-07-28 ENCOUNTER — APPOINTMENT (OUTPATIENT)
Dept: GENERAL RADIOLOGY | Facility: HOSPITAL | Age: 42
End: 2017-07-28

## 2017-07-28 ENCOUNTER — HOSPITAL ENCOUNTER (INPATIENT)
Facility: HOSPITAL | Age: 42
LOS: 6 days | Discharge: HOME HEALTH | End: 2017-08-04
Attending: EMERGENCY MEDICINE | Admitting: HOSPITALIST

## 2017-07-28 DIAGNOSIS — A41.9 SEPSIS, DUE TO UNSPECIFIED ORGANISM: Primary | ICD-10-CM

## 2017-07-28 DIAGNOSIS — M86.18 ACUTE OSTEOMYELITIS OF OTHER SITE: ICD-10-CM

## 2017-07-28 LAB
A-A DO2: 44.6 MMHG (ref 0–300)
ARTERIAL PATENCY WRIST A: ABNORMAL
ATMOSPHERIC PRESS: 723 MMHG
BASE EXCESS BLDA CALC-SCNC: -5.5 MMOL/L
BDY SITE: ABNORMAL
BODY TEMPERATURE: 98.6 C
COHGB MFR BLD: 2 % (ref 0–5)
DEPRECATED RDW RBC AUTO: 45.6 FL (ref 37–54)
EOSINOPHIL # BLD MANUAL: 0.72 10*3/MM3 (ref 0–0.7)
EOSINOPHIL NFR BLD MANUAL: 3 % (ref 0–5)
ERYTHROCYTE [DISTWIDTH] IN BLOOD BY AUTOMATED COUNT: 14 % (ref 11.5–14.5)
HCO3 BLDA-SCNC: 20.7 MMOL/L (ref 22–26)
HCT VFR BLD AUTO: 34.4 % (ref 42–52)
HCT VFR BLD CALC: 32 % (ref 42–52)
HGB BLD-MCNC: 10.6 G/DL (ref 14–18)
HGB BLDA-MCNC: 10.8 G/DL (ref 12–16)
HOROWITZ INDEX BLD+IHG-RTO: 21 %
HYPOCHROMIA BLD QL: ABNORMAL
LYMPHOCYTES # BLD MANUAL: 0.72 10*3/MM3 (ref 1–3)
LYMPHOCYTES NFR BLD MANUAL: 3 % (ref 21–51)
LYMPHOCYTES NFR BLD MANUAL: 5 % (ref 0–10)
MCH RBC QN AUTO: 27.5 PG (ref 27–33)
MCHC RBC AUTO-ENTMCNC: 30.8 G/DL (ref 33–37)
MCV RBC AUTO: 89.1 FL (ref 80–94)
METHGB BLD QL: 0.4 % (ref 0–3)
MODALITY: ABNORMAL
MONOCYTES # BLD AUTO: 1.21 10*3/MM3 (ref 0.1–0.9)
NEUTROPHILS # BLD AUTO: 21.47 10*3/MM3 (ref 1.4–6.5)
NEUTROPHILS NFR BLD MANUAL: 82 % (ref 30–70)
NEUTS BAND NFR BLD MANUAL: 7 % (ref 4–12)
OXYHGB MFR BLDV: 74.7 % (ref 85–100)
PCO2 BLDA: 43.6 MM HG (ref 35–45)
PH BLDA: 7.29 PH UNITS (ref 7.35–7.45)
PLATELET # BLD AUTO: 455 10*3/MM3 (ref 130–400)
PMV BLD AUTO: 9.9 FL (ref 6–10)
PO2 BLDA: 45.2 MM HG (ref 80–100)
RBC # BLD AUTO: 3.86 10*6/MM3 (ref 4.7–6.1)
SAO2 % BLDCOA: 76.5 % (ref 90–100)
SCAN SLIDE: NORMAL
SMALL PLATELETS BLD QL SMEAR: ABNORMAL
WBC NRBC COR # BLD: 24.12 10*3/MM3 (ref 4.5–12.5)

## 2017-07-28 PROCEDURE — 83605 ASSAY OF LACTIC ACID: CPT | Performed by: NURSE PRACTITIONER

## 2017-07-28 PROCEDURE — 94799 UNLISTED PULMONARY SVC/PX: CPT

## 2017-07-28 PROCEDURE — 93005 ELECTROCARDIOGRAM TRACING: CPT | Performed by: NURSE PRACTITIONER

## 2017-07-28 PROCEDURE — 82553 CREATINE MB FRACTION: CPT | Performed by: NURSE PRACTITIONER

## 2017-07-28 PROCEDURE — 87040 BLOOD CULTURE FOR BACTERIA: CPT | Performed by: NURSE PRACTITIONER

## 2017-07-28 PROCEDURE — 87186 SC STD MICRODIL/AGAR DIL: CPT | Performed by: NURSE PRACTITIONER

## 2017-07-28 PROCEDURE — 36600 WITHDRAWAL OF ARTERIAL BLOOD: CPT | Performed by: NURSE PRACTITIONER

## 2017-07-28 PROCEDURE — 84484 ASSAY OF TROPONIN QUANT: CPT | Performed by: NURSE PRACTITIONER

## 2017-07-28 PROCEDURE — 85025 COMPLETE CBC W/AUTO DIFF WBC: CPT | Performed by: NURSE PRACTITIONER

## 2017-07-28 PROCEDURE — 83050 HGB METHEMOGLOBIN QUAN: CPT | Performed by: NURSE PRACTITIONER

## 2017-07-28 PROCEDURE — 85007 BL SMEAR W/DIFF WBC COUNT: CPT | Performed by: NURSE PRACTITIONER

## 2017-07-28 PROCEDURE — 83036 HEMOGLOBIN GLYCOSYLATED A1C: CPT | Performed by: HOSPITALIST

## 2017-07-28 PROCEDURE — 82375 ASSAY CARBOXYHB QUANT: CPT | Performed by: NURSE PRACTITIONER

## 2017-07-28 PROCEDURE — 71010 HC CHEST PA OR AP: CPT

## 2017-07-28 PROCEDURE — 80053 COMPREHEN METABOLIC PANEL: CPT | Performed by: NURSE PRACTITIONER

## 2017-07-28 PROCEDURE — 82805 BLOOD GASES W/O2 SATURATION: CPT | Performed by: NURSE PRACTITIONER

## 2017-07-28 PROCEDURE — 93010 ELECTROCARDIOGRAM REPORT: CPT | Performed by: INTERNAL MEDICINE

## 2017-07-28 PROCEDURE — 71010 XR CHEST 1 VW: CPT | Performed by: RADIOLOGY

## 2017-07-28 PROCEDURE — 87147 CULTURE TYPE IMMUNOLOGIC: CPT | Performed by: NURSE PRACTITIONER

## 2017-07-28 PROCEDURE — 87077 CULTURE AEROBIC IDENTIFY: CPT | Performed by: NURSE PRACTITIONER

## 2017-07-28 PROCEDURE — 99285 EMERGENCY DEPT VISIT HI MDM: CPT

## 2017-07-28 RX ORDER — SODIUM CHLORIDE 0.9 % (FLUSH) 0.9 %
10 SYRINGE (ML) INJECTION AS NEEDED
Status: DISCONTINUED | OUTPATIENT
Start: 2017-07-28 | End: 2017-08-04 | Stop reason: HOSPADM

## 2017-07-28 RX ORDER — ACETAMINOPHEN 650 MG/1
650 SUPPOSITORY RECTAL ONCE
Status: COMPLETED | OUTPATIENT
Start: 2017-07-28 | End: 2017-07-28

## 2017-07-28 RX ADMIN — ACETAMINOPHEN 650 MG: 650 SUPPOSITORY RECTAL at 23:53

## 2017-07-28 RX ADMIN — SODIUM CHLORIDE 1000 ML: 9 INJECTION, SOLUTION INTRAVENOUS at 23:17

## 2017-07-29 ENCOUNTER — APPOINTMENT (OUTPATIENT)
Dept: CT IMAGING | Facility: HOSPITAL | Age: 42
End: 2017-07-29

## 2017-07-29 PROBLEM — A41.9 SEPSIS: Status: ACTIVE | Noted: 2017-07-29

## 2017-07-29 LAB
027 TOXIN: NORMAL
6-ACETYL MORPHINE: NEGATIVE
A-A DO2: 51.1 MMHG (ref 0–300)
A-A DO2: 53.6 MMHG (ref 0–300)
A-A DO2: 59.5 MMHG (ref 0–300)
ALBUMIN SERPL-MCNC: 3.7 G/DL (ref 3.5–5)
ALBUMIN/GLOB SERPL: 0.8 G/DL (ref 1.5–2.5)
ALP SERPL-CCNC: 149 U/L (ref 40–129)
ALT SERPL W P-5'-P-CCNC: 34 U/L (ref 10–44)
AMPHET+METHAMPHET UR QL: NEGATIVE
ANION GAP SERPL CALCULATED.3IONS-SCNC: 9.4 MMOL/L (ref 3.6–11.2)
ARTERIAL PATENCY WRIST A: POSITIVE
AST SERPL-CCNC: 39 U/L (ref 10–34)
ATMOSPHERIC PRESS: 723 MMHG
BACTERIA UR QL AUTO: ABNORMAL /HPF
BARBITURATES UR QL SCN: NEGATIVE
BASE EXCESS BLDA CALC-SCNC: -3.3 MMOL/L
BASE EXCESS BLDA CALC-SCNC: -6 MMOL/L
BASE EXCESS BLDA CALC-SCNC: -8.2 MMOL/L
BDY SITE: ABNORMAL
BENZODIAZ UR QL SCN: NEGATIVE
BILIRUB SERPL-MCNC: 1.6 MG/DL (ref 0.2–1.8)
BILIRUB UR QL STRIP: ABNORMAL
BODY TEMPERATURE: 98.6 C
BUN BLD-MCNC: 24 MG/DL (ref 7–21)
BUN/CREAT SERPL: 23.1 (ref 7–25)
BUPRENORPHINE SERPL-MCNC: NEGATIVE NG/ML
C DIFF TOX GENS STL QL NAA+PROBE: NEGATIVE
CALCIUM SPEC-SCNC: 9.3 MG/DL (ref 7.7–10)
CANNABINOIDS SERPL QL: POSITIVE
CHLORIDE SERPL-SCNC: 108 MMOL/L (ref 99–112)
CK MB SERPL-CCNC: 0.88 NG/ML (ref 0–5)
CK MB SERPL-CCNC: 1.01 NG/ML (ref 0–5)
CLARITY UR: ABNORMAL
CO2 SERPL-SCNC: 20.6 MMOL/L (ref 24.3–31.9)
COCAINE UR QL: NEGATIVE
COHGB MFR BLD: 0.8 % (ref 0–5)
COHGB MFR BLD: 1.8 % (ref 0–5)
COHGB MFR BLD: 2 % (ref 0–5)
COLOR UR: ABNORMAL
CREAT BLD-MCNC: 1.04 MG/DL (ref 0.43–1.29)
CRP SERPL-MCNC: 20.15 MG/DL (ref 0–0.99)
D-LACTATE SERPL-SCNC: 2 MMOL/L (ref 0.5–2)
EPAP: 10
GFR SERPL CREATININE-BSD FRML MDRD: 80 ML/MIN/1.73
GLOBULIN UR ELPH-MCNC: 4.5 GM/DL
GLUCOSE BLD-MCNC: 146 MG/DL (ref 70–110)
GLUCOSE BLDC GLUCOMTR-MCNC: 117 MG/DL (ref 70–130)
GLUCOSE BLDC GLUCOMTR-MCNC: 80 MG/DL (ref 70–130)
GLUCOSE BLDC GLUCOMTR-MCNC: 86 MG/DL (ref 70–130)
GLUCOSE BLDC GLUCOMTR-MCNC: 94 MG/DL (ref 70–130)
GLUCOSE UR STRIP-MCNC: NEGATIVE MG/DL
HAV IGM SERPL QL IA: NORMAL
HBA1C MFR BLD: 5.7 % (ref 4.5–5.7)
HBV CORE IGM SERPL QL IA: NORMAL
HBV SURFACE AG SERPL QL IA: NORMAL
HCO3 BLDA-SCNC: 19.2 MMOL/L (ref 22–26)
HCO3 BLDA-SCNC: 19.4 MMOL/L (ref 22–26)
HCO3 BLDA-SCNC: 22.6 MMOL/L (ref 22–26)
HCT VFR BLD CALC: 29 % (ref 42–52)
HCV AB SER DONR QL: NORMAL
HGB BLDA-MCNC: 9.7 G/DL (ref 12–16)
HGB BLDA-MCNC: 9.7 G/DL (ref 12–16)
HGB BLDA-MCNC: 9.9 G/DL (ref 12–16)
HGB UR QL STRIP.AUTO: ABNORMAL
HOROWITZ INDEX BLD+IHG-RTO: 28 %
HOROWITZ INDEX BLD+IHG-RTO: 30 %
HOROWITZ INDEX BLD+IHG-RTO: 34 %
HYALINE CASTS UR QL AUTO: ABNORMAL /LPF
IPAP: 24
KETONES UR QL STRIP: NEGATIVE
LEUKOCYTE ESTERASE UR QL STRIP.AUTO: ABNORMAL
METHADONE UR QL SCN: NEGATIVE
METHGB BLD QL: 0.2 % (ref 0–3)
METHGB BLD QL: 0.2 % (ref 0–3)
METHGB BLD QL: 0.4 % (ref 0–3)
MODALITY: ABNORMAL
NITRITE UR QL STRIP: NEGATIVE
OPIATES UR QL: NEGATIVE
OSMOLALITY SERPL CALC.SUM OF ELEC: 282.4 MOSM/KG (ref 273–305)
OXYCODONE UR QL SCN: NEGATIVE
OXYHGB MFR BLDV: 91.3 % (ref 85–100)
OXYHGB MFR BLDV: 95.6 % (ref 85–100)
OXYHGB MFR BLDV: 97.6 % (ref 85–100)
PCO2 BLDA: 36.9 MM HG (ref 35–45)
PCO2 BLDA: 44.4 MM HG (ref 35–45)
PCO2 BLDA: 49.6 MM HG (ref 35–45)
PCP UR QL SCN: NEGATIVE
PH BLDA: 7.21 PH UNITS (ref 7.35–7.45)
PH BLDA: 7.33 PH UNITS (ref 7.35–7.45)
PH BLDA: 7.33 PH UNITS (ref 7.35–7.45)
PH UR STRIP.AUTO: 6 [PH] (ref 5–8)
PO2 BLDA: 127.4 MM HG (ref 80–100)
PO2 BLDA: 79.7 MM HG (ref 80–100)
PO2 BLDA: 97 MM HG (ref 80–100)
POTASSIUM BLD-SCNC: 3.3 MMOL/L (ref 3.5–5.3)
POTASSIUM BLD-SCNC: 3.4 MMOL/L (ref 3.5–5.3)
PROT SERPL-MCNC: 8.2 G/DL (ref 6–8)
PROT UR QL STRIP: ABNORMAL
RBC # UR: ABNORMAL /HPF
REF LAB TEST METHOD: ABNORMAL
SAO2 % BLDCOA: 93.5 % (ref 90–100)
SAO2 % BLDCOA: 97.6 % (ref 90–100)
SAO2 % BLDCOA: 98.6 % (ref 90–100)
SET MECH RESP RATE: 18
SODIUM BLD-SCNC: 138 MMOL/L (ref 135–153)
SP GR UR STRIP: 1.02 (ref 1–1.03)
SQUAMOUS #/AREA URNS HPF: ABNORMAL /HPF
TOTAL RATE: 20 BREATHS/MINUTE
TROPONIN I SERPL-MCNC: 0.01 NG/ML
TROPONIN I SERPL-MCNC: 0.01 NG/ML
UROBILINOGEN UR QL STRIP: ABNORMAL
WBC UR QL AUTO: ABNORMAL /HPF

## 2017-07-29 PROCEDURE — 99221 1ST HOSP IP/OBS SF/LOW 40: CPT | Performed by: SURGERY

## 2017-07-29 PROCEDURE — 87077 CULTURE AEROBIC IDENTIFY: CPT | Performed by: PHYSICIAN ASSISTANT

## 2017-07-29 PROCEDURE — 25010000002 MEROPENEM: Performed by: NURSE PRACTITIONER

## 2017-07-29 PROCEDURE — 84484 ASSAY OF TROPONIN QUANT: CPT | Performed by: NURSE PRACTITIONER

## 2017-07-29 PROCEDURE — 94660 CPAP INITIATION&MGMT: CPT

## 2017-07-29 PROCEDURE — 25010000003 POTASSIUM CHLORIDE 10 MEQ/100ML SOLUTION

## 2017-07-29 PROCEDURE — 81001 URINALYSIS AUTO W/SCOPE: CPT | Performed by: NURSE PRACTITIONER

## 2017-07-29 PROCEDURE — 82375 ASSAY CARBOXYHB QUANT: CPT | Performed by: HOSPITALIST

## 2017-07-29 PROCEDURE — 82962 GLUCOSE BLOOD TEST: CPT

## 2017-07-29 PROCEDURE — 71260 CT THORAX DX C+: CPT

## 2017-07-29 PROCEDURE — 80307 DRUG TEST PRSMV CHEM ANLYZR: CPT | Performed by: NURSE PRACTITIONER

## 2017-07-29 PROCEDURE — 80074 ACUTE HEPATITIS PANEL: CPT | Performed by: INTERNAL MEDICINE

## 2017-07-29 PROCEDURE — 25010000002 MEROPENEM

## 2017-07-29 PROCEDURE — 71260 CT THORAX DX C+: CPT | Performed by: RADIOLOGY

## 2017-07-29 PROCEDURE — 25010000002 VANCOMYCIN PER 500 MG: Performed by: NURSE PRACTITIONER

## 2017-07-29 PROCEDURE — 87086 URINE CULTURE/COLONY COUNT: CPT | Performed by: NURSE PRACTITIONER

## 2017-07-29 PROCEDURE — 74177 CT ABD & PELVIS W/CONTRAST: CPT | Performed by: RADIOLOGY

## 2017-07-29 PROCEDURE — 94799 UNLISTED PULMONARY SVC/PX: CPT

## 2017-07-29 PROCEDURE — 87147 CULTURE TYPE IMMUNOLOGIC: CPT | Performed by: PHYSICIAN ASSISTANT

## 2017-07-29 PROCEDURE — 36600 WITHDRAWAL OF ARTERIAL BLOOD: CPT | Performed by: HOSPITALIST

## 2017-07-29 PROCEDURE — 82375 ASSAY CARBOXYHB QUANT: CPT | Performed by: NURSE PRACTITIONER

## 2017-07-29 PROCEDURE — 82553 CREATINE MB FRACTION: CPT | Performed by: NURSE PRACTITIONER

## 2017-07-29 PROCEDURE — 82805 BLOOD GASES W/O2 SATURATION: CPT | Performed by: NURSE PRACTITIONER

## 2017-07-29 PROCEDURE — 87493 C DIFF AMPLIFIED PROBE: CPT | Performed by: HOSPITALIST

## 2017-07-29 PROCEDURE — 83050 HGB METHEMOGLOBIN QUAN: CPT | Performed by: HOSPITALIST

## 2017-07-29 PROCEDURE — 86140 C-REACTIVE PROTEIN: CPT | Performed by: HOSPITALIST

## 2017-07-29 PROCEDURE — 99291 CRITICAL CARE FIRST HOUR: CPT | Performed by: HOSPITALIST

## 2017-07-29 PROCEDURE — 70450 CT HEAD/BRAIN W/O DYE: CPT | Performed by: RADIOLOGY

## 2017-07-29 PROCEDURE — 83050 HGB METHEMOGLOBIN QUAN: CPT | Performed by: NURSE PRACTITIONER

## 2017-07-29 PROCEDURE — 82805 BLOOD GASES W/O2 SATURATION: CPT | Performed by: HOSPITALIST

## 2017-07-29 PROCEDURE — 70450 CT HEAD/BRAIN W/O DYE: CPT

## 2017-07-29 PROCEDURE — 74177 CT ABD & PELVIS W/CONTRAST: CPT

## 2017-07-29 PROCEDURE — 87186 SC STD MICRODIL/AGAR DIL: CPT | Performed by: PHYSICIAN ASSISTANT

## 2017-07-29 PROCEDURE — 87077 CULTURE AEROBIC IDENTIFY: CPT | Performed by: NURSE PRACTITIONER

## 2017-07-29 PROCEDURE — 87186 SC STD MICRODIL/AGAR DIL: CPT | Performed by: NURSE PRACTITIONER

## 2017-07-29 PROCEDURE — 25010000002 VANCOMYCIN PER 500 MG

## 2017-07-29 PROCEDURE — 87070 CULTURE OTHR SPECIMN AEROBIC: CPT | Performed by: PHYSICIAN ASSISTANT

## 2017-07-29 PROCEDURE — 36600 WITHDRAWAL OF ARTERIAL BLOOD: CPT | Performed by: NURSE PRACTITIONER

## 2017-07-29 PROCEDURE — 87040 BLOOD CULTURE FOR BACTERIA: CPT | Performed by: INTERNAL MEDICINE

## 2017-07-29 PROCEDURE — 84132 ASSAY OF SERUM POTASSIUM: CPT | Performed by: HOSPITALIST

## 2017-07-29 PROCEDURE — 87205 SMEAR GRAM STAIN: CPT | Performed by: PHYSICIAN ASSISTANT

## 2017-07-29 PROCEDURE — 0 IOPAMIDOL 61 % SOLUTION: Performed by: EMERGENCY MEDICINE

## 2017-07-29 PROCEDURE — 87181 SC STD AGAR DILUTION PER AGT: CPT | Performed by: PHYSICIAN ASSISTANT

## 2017-07-29 PROCEDURE — 25010000002 PROMETHAZINE PER 50 MG: Performed by: INTERNAL MEDICINE

## 2017-07-29 RX ORDER — SODIUM CHLORIDE 9 MG/ML
125 INJECTION, SOLUTION INTRAVENOUS CONTINUOUS
Status: DISCONTINUED | OUTPATIENT
Start: 2017-07-29 | End: 2017-07-29

## 2017-07-29 RX ORDER — POTASSIUM CHLORIDE 7.45 MG/ML
10 INJECTION INTRAVENOUS
Status: COMPLETED | OUTPATIENT
Start: 2017-07-29 | End: 2017-07-29

## 2017-07-29 RX ORDER — METHENAMINE HIPPURATE 1000 MG/1
1 TABLET ORAL EVERY 12 HOURS SCHEDULED
Status: DISCONTINUED | OUTPATIENT
Start: 2017-07-29 | End: 2017-08-04 | Stop reason: HOSPADM

## 2017-07-29 RX ORDER — SODIUM CHLORIDE 0.9 % (FLUSH) 0.9 %
1-10 SYRINGE (ML) INJECTION AS NEEDED
Status: DISCONTINUED | OUTPATIENT
Start: 2017-07-29 | End: 2017-08-04 | Stop reason: HOSPADM

## 2017-07-29 RX ORDER — CASTOR OIL AND BALSAM, PERU 788; 87 MG/G; MG/G
OINTMENT TOPICAL EVERY 12 HOURS SCHEDULED
Status: DISCONTINUED | OUTPATIENT
Start: 2017-07-29 | End: 2017-08-04 | Stop reason: HOSPADM

## 2017-07-29 RX ORDER — POTASSIUM CHLORIDE 7.45 MG/ML
10 INJECTION INTRAVENOUS
Status: DISCONTINUED | OUTPATIENT
Start: 2017-07-29 | End: 2017-08-04 | Stop reason: HOSPADM

## 2017-07-29 RX ORDER — GABAPENTIN 400 MG/1
400 CAPSULE ORAL EVERY 8 HOURS SCHEDULED
Status: DISCONTINUED | OUTPATIENT
Start: 2017-07-29 | End: 2017-08-04 | Stop reason: HOSPADM

## 2017-07-29 RX ORDER — POTASSIUM CHLORIDE 750 MG/1
40 CAPSULE, EXTENDED RELEASE ORAL AS NEEDED
Status: DISCONTINUED | OUTPATIENT
Start: 2017-07-29 | End: 2017-08-04 | Stop reason: HOSPADM

## 2017-07-29 RX ORDER — MAGNESIUM SULFATE HEPTAHYDRATE 40 MG/ML
2 INJECTION, SOLUTION INTRAVENOUS AS NEEDED
Status: DISCONTINUED | OUTPATIENT
Start: 2017-07-29 | End: 2017-08-04 | Stop reason: HOSPADM

## 2017-07-29 RX ORDER — PANTOPRAZOLE SODIUM 40 MG/1
40 TABLET, DELAYED RELEASE ORAL EVERY MORNING
Status: CANCELLED | OUTPATIENT
Start: 2017-07-29

## 2017-07-29 RX ORDER — ACETAMINOPHEN 325 MG/1
650 TABLET ORAL EVERY 6 HOURS PRN
Status: DISCONTINUED | OUTPATIENT
Start: 2017-07-29 | End: 2017-08-04 | Stop reason: HOSPADM

## 2017-07-29 RX ORDER — PANTOPRAZOLE SODIUM 40 MG/1
40 TABLET, DELAYED RELEASE ORAL
Status: DISCONTINUED | OUTPATIENT
Start: 2017-07-29 | End: 2017-08-04 | Stop reason: HOSPADM

## 2017-07-29 RX ORDER — PROMETHAZINE HYDROCHLORIDE 25 MG/1
25 TABLET ORAL EVERY 6 HOURS PRN
Status: DISCONTINUED | OUTPATIENT
Start: 2017-07-29 | End: 2017-08-04 | Stop reason: HOSPADM

## 2017-07-29 RX ORDER — MAGNESIUM SULFATE HEPTAHYDRATE 40 MG/ML
4 INJECTION, SOLUTION INTRAVENOUS AS NEEDED
Status: DISCONTINUED | OUTPATIENT
Start: 2017-07-29 | End: 2017-08-04 | Stop reason: HOSPADM

## 2017-07-29 RX ORDER — SUCRALFATE 1 G/1
1 TABLET ORAL 4 TIMES DAILY
Status: DISCONTINUED | OUTPATIENT
Start: 2017-07-29 | End: 2017-08-03

## 2017-07-29 RX ORDER — TOPIRAMATE 100 MG/1
100 TABLET, FILM COATED ORAL 3 TIMES DAILY
Status: DISCONTINUED | OUTPATIENT
Start: 2017-07-29 | End: 2017-08-04 | Stop reason: HOSPADM

## 2017-07-29 RX ORDER — BACLOFEN 10 MG/1
30 TABLET ORAL 4 TIMES DAILY
Status: CANCELLED | OUTPATIENT
Start: 2017-07-29

## 2017-07-29 RX ORDER — ASCORBIC ACID 500 MG
250 TABLET ORAL 2 TIMES DAILY
Status: DISCONTINUED | OUTPATIENT
Start: 2017-07-29 | End: 2017-08-04 | Stop reason: HOSPADM

## 2017-07-29 RX ORDER — POTASSIUM CHLORIDE 1.5 G/1.77G
40 POWDER, FOR SOLUTION ORAL AS NEEDED
Status: DISCONTINUED | OUTPATIENT
Start: 2017-07-29 | End: 2017-08-04 | Stop reason: HOSPADM

## 2017-07-29 RX ORDER — TRAZODONE HYDROCHLORIDE 50 MG/1
50 TABLET ORAL NIGHTLY
Status: DISCONTINUED | OUTPATIENT
Start: 2017-07-29 | End: 2017-07-29

## 2017-07-29 RX ORDER — DEXTROSE MONOHYDRATE 25 G/50ML
25 INJECTION, SOLUTION INTRAVENOUS
Status: DISCONTINUED | OUTPATIENT
Start: 2017-07-29 | End: 2017-08-04 | Stop reason: HOSPADM

## 2017-07-29 RX ORDER — TRAZODONE HYDROCHLORIDE 50 MG/1
50 TABLET ORAL NIGHTLY PRN
Status: DISCONTINUED | OUTPATIENT
Start: 2017-07-29 | End: 2017-08-04 | Stop reason: HOSPADM

## 2017-07-29 RX ORDER — BACLOFEN 10 MG/1
30 TABLET ORAL EVERY 8 HOURS PRN
Status: DISCONTINUED | OUTPATIENT
Start: 2017-07-29 | End: 2017-08-04 | Stop reason: HOSPADM

## 2017-07-29 RX ORDER — METHENAMINE HIPPURATE 1000 MG/1
1 TABLET ORAL 2 TIMES DAILY
Status: CANCELLED | OUTPATIENT
Start: 2017-07-29

## 2017-07-29 RX ORDER — IBUPROFEN 600 MG/1
600 TABLET ORAL 3 TIMES DAILY PRN
Status: DISCONTINUED | OUTPATIENT
Start: 2017-07-29 | End: 2017-08-04 | Stop reason: HOSPADM

## 2017-07-29 RX ORDER — SODIUM HYPOCHLORITE 2.5 MG/ML
SOLUTION TOPICAL 2 TIMES DAILY
Status: DISCONTINUED | OUTPATIENT
Start: 2017-07-29 | End: 2017-07-31

## 2017-07-29 RX ORDER — GABAPENTIN 400 MG/1
400 CAPSULE ORAL 2 TIMES DAILY
COMMUNITY
End: 2017-09-21

## 2017-07-29 RX ORDER — MODAFINIL 100 MG/1
200 TABLET ORAL DAILY
Status: DISCONTINUED | OUTPATIENT
Start: 2017-07-29 | End: 2017-08-04 | Stop reason: HOSPADM

## 2017-07-29 RX ORDER — POTASSIUM CHLORIDE 20 MEQ/1
40 TABLET, EXTENDED RELEASE ORAL EVERY 4 HOURS
Status: COMPLETED | OUTPATIENT
Start: 2017-07-29 | End: 2017-07-30

## 2017-07-29 RX ORDER — DULOXETIN HYDROCHLORIDE 60 MG/1
60 CAPSULE, DELAYED RELEASE ORAL 2 TIMES DAILY
Status: DISCONTINUED | OUTPATIENT
Start: 2017-07-29 | End: 2017-08-04 | Stop reason: HOSPADM

## 2017-07-29 RX ORDER — NICOTINE POLACRILEX 4 MG
15 LOZENGE BUCCAL
Status: DISCONTINUED | OUTPATIENT
Start: 2017-07-29 | End: 2017-08-04 | Stop reason: HOSPADM

## 2017-07-29 RX ORDER — GABAPENTIN 400 MG/1
400 CAPSULE ORAL EVERY 12 HOURS SCHEDULED
Status: DISCONTINUED | OUTPATIENT
Start: 2017-07-29 | End: 2017-07-29

## 2017-07-29 RX ORDER — GABAPENTIN 400 MG/1
400 CAPSULE ORAL 2 TIMES DAILY WITH MEALS
Status: CANCELLED | OUTPATIENT
Start: 2017-07-29

## 2017-07-29 RX ORDER — MENTHOL AND ZINC OXIDE .44; 20.625 G/100G; G/100G
OINTMENT TOPICAL 3 TIMES DAILY PRN
Status: DISCONTINUED | OUTPATIENT
Start: 2017-07-29 | End: 2017-08-04 | Stop reason: HOSPADM

## 2017-07-29 RX ORDER — GABAPENTIN 400 MG/1
800 CAPSULE ORAL EVERY 12 HOURS SCHEDULED
Status: CANCELLED | OUTPATIENT
Start: 2017-07-29

## 2017-07-29 RX ORDER — SODIUM CHLORIDE 9 MG/ML
30 INJECTION, SOLUTION INTRAVENOUS CONTINUOUS
Status: DISCONTINUED | OUTPATIENT
Start: 2017-07-29 | End: 2017-08-03

## 2017-07-29 RX ADMIN — SODIUM HYPOCHLORITE: 2.5 SOLUTION TOPICAL at 14:01

## 2017-07-29 RX ADMIN — POTASSIUM CHLORIDE 10 MEQ: 7.46 INJECTION, SOLUTION INTRAVENOUS at 12:10

## 2017-07-29 RX ADMIN — TOPIRAMATE 100 MG: 100 TABLET, FILM COATED ORAL at 11:12

## 2017-07-29 RX ADMIN — BACLOFEN 30 MG: 10 TABLET ORAL at 11:12

## 2017-07-29 RX ADMIN — CASTOR OIL AND BALSAM, PERU: 788; 87 OINTMENT TOPICAL at 21:17

## 2017-07-29 RX ADMIN — DULOXETINE HYDROCHLORIDE 60 MG: 60 CAPSULE, DELAYED RELEASE ORAL at 17:16

## 2017-07-29 RX ADMIN — ACETAMINOPHEN 650 MG: 325 TABLET ORAL at 12:25

## 2017-07-29 RX ADMIN — SODIUM CHLORIDE 1000 ML: 9 INJECTION, SOLUTION INTRAVENOUS at 00:46

## 2017-07-29 RX ADMIN — TOPIRAMATE 100 MG: 100 TABLET, FILM COATED ORAL at 21:18

## 2017-07-29 RX ADMIN — METHENAMINE HIPPURATE 1 G: 1 TABLET ORAL at 21:45

## 2017-07-29 RX ADMIN — GABAPENTIN 400 MG: 400 CAPSULE ORAL at 13:59

## 2017-07-29 RX ADMIN — POTASSIUM CHLORIDE 10 MEQ: 7.46 INJECTION, SOLUTION INTRAVENOUS at 13:15

## 2017-07-29 RX ADMIN — AZTREONAM 2 G: 2 INJECTION, POWDER, FOR SOLUTION INTRAMUSCULAR; INTRAVENOUS at 00:12

## 2017-07-29 RX ADMIN — SODIUM CHLORIDE 125 ML/HR: 9 INJECTION, SOLUTION INTRAVENOUS at 21:32

## 2017-07-29 RX ADMIN — TRAZODONE HYDROCHLORIDE 50 MG: 50 TABLET ORAL at 21:18

## 2017-07-29 RX ADMIN — POTASSIUM CHLORIDE 10 MEQ: 7.46 INJECTION, SOLUTION INTRAVENOUS at 10:15

## 2017-07-29 RX ADMIN — SODIUM CHLORIDE 1000 ML: 9 INJECTION, SOLUTION INTRAVENOUS at 00:11

## 2017-07-29 RX ADMIN — GABAPENTIN 400 MG: 400 CAPSULE ORAL at 21:32

## 2017-07-29 RX ADMIN — MEROPENEM 1 G: 1 INJECTION, POWDER, FOR SOLUTION INTRAVENOUS at 14:01

## 2017-07-29 RX ADMIN — SUCRALFATE 1 G: 1 TABLET ORAL at 17:16

## 2017-07-29 RX ADMIN — MODAFINIL 200 MG: 100 TABLET ORAL at 11:12

## 2017-07-29 RX ADMIN — SODIUM CHLORIDE 125 ML/HR: 9 INJECTION, SOLUTION INTRAVENOUS at 10:13

## 2017-07-29 RX ADMIN — VANCOMYCIN HYDROCHLORIDE 2000 MG: 5 INJECTION, POWDER, LYOPHILIZED, FOR SOLUTION INTRAVENOUS at 00:39

## 2017-07-29 RX ADMIN — SODIUM CHLORIDE 500 ML: 9 INJECTION, SOLUTION INTRAVENOUS at 04:34

## 2017-07-29 RX ADMIN — Medication 250 MG: at 17:16

## 2017-07-29 RX ADMIN — PANTOPRAZOLE SODIUM 40 MG: 40 TABLET, DELAYED RELEASE ORAL at 11:12

## 2017-07-29 RX ADMIN — IOPAMIDOL 100 ML: 612 INJECTION, SOLUTION INTRAVENOUS at 03:45

## 2017-07-29 RX ADMIN — MEROPENEM 1 G: 1 INJECTION, POWDER, FOR SOLUTION INTRAVENOUS at 21:18

## 2017-07-29 RX ADMIN — VANCOMYCIN HYDROCHLORIDE 2000 MG: 5 INJECTION, POWDER, LYOPHILIZED, FOR SOLUTION INTRAVENOUS at 11:13

## 2017-07-29 RX ADMIN — DULOXETINE HYDROCHLORIDE 60 MG: 60 CAPSULE, DELAYED RELEASE ORAL at 11:12

## 2017-07-29 RX ADMIN — PROMETHAZINE HYDROCHLORIDE 12.5 MG: 25 INJECTION INTRAMUSCULAR; INTRAVENOUS at 09:49

## 2017-07-29 RX ADMIN — METHENAMINE HIPPURATE 1 G: 1 TABLET ORAL at 14:00

## 2017-07-29 RX ADMIN — SUCRALFATE 1 G: 1 TABLET ORAL at 21:18

## 2017-07-29 RX ADMIN — MEROPENEM 1 G: 1 INJECTION, POWDER, FOR SOLUTION INTRAVENOUS at 05:22

## 2017-07-29 RX ADMIN — Medication 250 MG: at 11:12

## 2017-07-29 RX ADMIN — BACLOFEN 30 MG: 10 TABLET ORAL at 21:21

## 2017-07-29 RX ADMIN — POTASSIUM CHLORIDE 40 MEQ: 1500 TABLET, EXTENDED RELEASE ORAL at 21:01

## 2017-07-29 RX ADMIN — POTASSIUM CHLORIDE 10 MEQ: 7.46 INJECTION, SOLUTION INTRAVENOUS at 11:10

## 2017-07-29 RX ADMIN — CASTOR OIL AND BALSAM, PERU: 788; 87 OINTMENT TOPICAL at 14:01

## 2017-07-29 RX ADMIN — SUCRALFATE 1 G: 1 TABLET ORAL at 11:12

## 2017-07-29 RX ADMIN — TOPIRAMATE 100 MG: 100 TABLET, FILM COATED ORAL at 16:27

## 2017-07-29 RX ADMIN — CHOLECALCIFEROL CAP 125 MCG (5000 UNIT) 5000 UNITS: 125 CAP at 13:59

## 2017-07-29 RX ADMIN — ACETAMINOPHEN 650 MG: 325 TABLET ORAL at 22:13

## 2017-07-30 LAB
ALBUMIN SERPL-MCNC: 3.1 G/DL (ref 3.5–5)
ALBUMIN/GLOB SERPL: 0.8 G/DL (ref 1.5–2.5)
ALP SERPL-CCNC: 139 U/L (ref 40–129)
ALT SERPL W P-5'-P-CCNC: 45 U/L (ref 10–44)
ANION GAP SERPL CALCULATED.3IONS-SCNC: 6.9 MMOL/L (ref 3.6–11.2)
AST SERPL-CCNC: 56 U/L (ref 10–34)
BASOPHILS # BLD AUTO: 0.02 10*3/MM3 (ref 0–0.3)
BASOPHILS NFR BLD AUTO: 0.2 % (ref 0–2)
BILIRUB SERPL-MCNC: 0.9 MG/DL (ref 0.2–1.8)
BUN BLD-MCNC: 13 MG/DL (ref 7–21)
BUN/CREAT SERPL: 19.4 (ref 7–25)
CALCIUM SPEC-SCNC: 8.4 MG/DL (ref 7.7–10)
CHLORIDE SERPL-SCNC: 113 MMOL/L (ref 99–112)
CO2 SERPL-SCNC: 20.1 MMOL/L (ref 24.3–31.9)
CREAT BLD-MCNC: 0.67 MG/DL (ref 0.43–1.29)
CRP SERPL-MCNC: 27.31 MG/DL (ref 0–0.99)
DEPRECATED RDW RBC AUTO: 45.3 FL (ref 37–54)
EOSINOPHIL # BLD AUTO: 1.28 10*3/MM3 (ref 0–0.7)
EOSINOPHIL NFR BLD AUTO: 10.3 % (ref 0–5)
ERYTHROCYTE [DISTWIDTH] IN BLOOD BY AUTOMATED COUNT: 14.1 % (ref 11.5–14.5)
GFR SERPL CREATININE-BSD FRML MDRD: 132 ML/MIN/1.73
GLOBULIN UR ELPH-MCNC: 3.8 GM/DL
GLUCOSE BLD-MCNC: 103 MG/DL (ref 70–110)
GLUCOSE BLDC GLUCOMTR-MCNC: 109 MG/DL (ref 70–130)
GLUCOSE BLDC GLUCOMTR-MCNC: 113 MG/DL (ref 70–130)
GLUCOSE BLDC GLUCOMTR-MCNC: 89 MG/DL (ref 70–130)
GLUCOSE BLDC GLUCOMTR-MCNC: 92 MG/DL (ref 70–130)
GLUCOSE BLDC GLUCOMTR-MCNC: 92 MG/DL (ref 70–130)
GLUCOSE BLDC GLUCOMTR-MCNC: 97 MG/DL (ref 70–130)
HCT VFR BLD AUTO: 30.5 % (ref 42–52)
HGB BLD-MCNC: 9.1 G/DL (ref 14–18)
IMM GRANULOCYTES # BLD: 0.1 10*3/MM3 (ref 0–0.03)
IMM GRANULOCYTES NFR BLD: 0.8 % (ref 0–0.5)
LYMPHOCYTES # BLD AUTO: 1.27 10*3/MM3 (ref 1–3)
LYMPHOCYTES NFR BLD AUTO: 10.2 % (ref 21–51)
MAGNESIUM SERPL-MCNC: 1.8 MG/DL (ref 1.7–2.6)
MCH RBC QN AUTO: 26.9 PG (ref 27–33)
MCHC RBC AUTO-ENTMCNC: 29.8 G/DL (ref 33–37)
MCV RBC AUTO: 90.2 FL (ref 80–94)
MONOCYTES # BLD AUTO: 0.32 10*3/MM3 (ref 0.1–0.9)
MONOCYTES NFR BLD AUTO: 2.6 % (ref 0–10)
NEUTROPHILS # BLD AUTO: 9.47 10*3/MM3 (ref 1.4–6.5)
NEUTROPHILS NFR BLD AUTO: 75.9 % (ref 30–70)
OSMOLALITY SERPL CALC.SUM OF ELEC: 279.8 MOSM/KG (ref 273–305)
PHOSPHATE SERPL-MCNC: 1.9 MG/DL (ref 2.7–4.5)
PLATELET # BLD AUTO: 444 10*3/MM3 (ref 130–400)
PMV BLD AUTO: 9.8 FL (ref 6–10)
POTASSIUM BLD-SCNC: 3.4 MMOL/L (ref 3.5–5.3)
POTASSIUM BLD-SCNC: 4.3 MMOL/L (ref 3.5–5.3)
PROT SERPL-MCNC: 6.9 G/DL (ref 6–8)
RBC # BLD AUTO: 3.38 10*6/MM3 (ref 4.7–6.1)
SODIUM BLD-SCNC: 140 MMOL/L (ref 135–153)
VANCOMYCIN TROUGH SERPL-MCNC: 15.1 MCG/ML (ref 5–15)
WBC NRBC COR # BLD: 12.46 10*3/MM3 (ref 4.5–12.5)

## 2017-07-30 PROCEDURE — 84132 ASSAY OF SERUM POTASSIUM: CPT | Performed by: HOSPITALIST

## 2017-07-30 PROCEDURE — 25010000002 PROMETHAZINE PER 50 MG: Performed by: INTERNAL MEDICINE

## 2017-07-30 PROCEDURE — 25010000002 VANCOMYCIN PER 500 MG

## 2017-07-30 PROCEDURE — 85025 COMPLETE CBC W/AUTO DIFF WBC: CPT | Performed by: INTERNAL MEDICINE

## 2017-07-30 PROCEDURE — 80202 ASSAY OF VANCOMYCIN: CPT

## 2017-07-30 PROCEDURE — 25010000002 HEPARIN (PORCINE) PER 1000 UNITS: Performed by: INTERNAL MEDICINE

## 2017-07-30 PROCEDURE — 94660 CPAP INITIATION&MGMT: CPT

## 2017-07-30 PROCEDURE — 25010000002 MEROPENEM

## 2017-07-30 PROCEDURE — 84100 ASSAY OF PHOSPHORUS: CPT | Performed by: INTERNAL MEDICINE

## 2017-07-30 PROCEDURE — 86140 C-REACTIVE PROTEIN: CPT | Performed by: INTERNAL MEDICINE

## 2017-07-30 PROCEDURE — 99231 SBSQ HOSP IP/OBS SF/LOW 25: CPT | Performed by: SURGERY

## 2017-07-30 PROCEDURE — 83735 ASSAY OF MAGNESIUM: CPT | Performed by: INTERNAL MEDICINE

## 2017-07-30 PROCEDURE — 99233 SBSQ HOSP IP/OBS HIGH 50: CPT | Performed by: INTERNAL MEDICINE

## 2017-07-30 PROCEDURE — 80053 COMPREHEN METABOLIC PANEL: CPT | Performed by: INTERNAL MEDICINE

## 2017-07-30 PROCEDURE — 94799 UNLISTED PULMONARY SVC/PX: CPT

## 2017-07-30 PROCEDURE — 82962 GLUCOSE BLOOD TEST: CPT

## 2017-07-30 RX ORDER — HEPARIN SODIUM 5000 [USP'U]/ML
5000 INJECTION, SOLUTION INTRAVENOUS; SUBCUTANEOUS EVERY 8 HOURS SCHEDULED
Status: DISCONTINUED | OUTPATIENT
Start: 2017-07-30 | End: 2017-08-04 | Stop reason: HOSPADM

## 2017-07-30 RX ORDER — NYSTATIN 100000 [USP'U]/G
POWDER TOPICAL EVERY 12 HOURS SCHEDULED
Status: DISCONTINUED | OUTPATIENT
Start: 2017-07-30 | End: 2017-08-04 | Stop reason: HOSPADM

## 2017-07-30 RX ORDER — NYSTATIN 100000 [USP'U]/G
POWDER TOPICAL EVERY 8 HOURS SCHEDULED
Status: DISCONTINUED | OUTPATIENT
Start: 2017-07-30 | End: 2017-07-30

## 2017-07-30 RX ORDER — MULTIVITAMIN
1 TABLET ORAL DAILY
Status: DISCONTINUED | OUTPATIENT
Start: 2017-07-30 | End: 2017-08-04 | Stop reason: HOSPADM

## 2017-07-30 RX ADMIN — SUCRALFATE 1 G: 1 TABLET ORAL at 20:49

## 2017-07-30 RX ADMIN — POTASSIUM PHOSPHATE, MONOBASIC AND POTASSIUM PHOSPHATE, DIBASIC 15 MMOL: 224; 236 INJECTION, SOLUTION INTRAVENOUS at 03:08

## 2017-07-30 RX ADMIN — Medication 250 MG: at 08:47

## 2017-07-30 RX ADMIN — GABAPENTIN 400 MG: 400 CAPSULE ORAL at 20:49

## 2017-07-30 RX ADMIN — GABAPENTIN 400 MG: 400 CAPSULE ORAL at 14:13

## 2017-07-30 RX ADMIN — NYSTATIN: 100000 POWDER TOPICAL at 09:12

## 2017-07-30 RX ADMIN — HEPARIN SODIUM 5000 UNITS: 5000 INJECTION, SOLUTION INTRAVENOUS; SUBCUTANEOUS at 21:00

## 2017-07-30 RX ADMIN — METHENAMINE HIPPURATE 1 G: 1 TABLET ORAL at 08:46

## 2017-07-30 RX ADMIN — PROMETHAZINE HYDROCHLORIDE 12.5 MG: 25 INJECTION INTRAMUSCULAR; INTRAVENOUS at 05:54

## 2017-07-30 RX ADMIN — VANCOMYCIN HYDROCHLORIDE 2000 MG: 5 INJECTION, POWDER, LYOPHILIZED, FOR SOLUTION INTRAVENOUS at 14:14

## 2017-07-30 RX ADMIN — TOPIRAMATE 100 MG: 100 TABLET, FILM COATED ORAL at 16:22

## 2017-07-30 RX ADMIN — SUCRALFATE 1 G: 1 TABLET ORAL at 12:17

## 2017-07-30 RX ADMIN — METHENAMINE HIPPURATE 1 G: 1 TABLET ORAL at 20:50

## 2017-07-30 RX ADMIN — Medication 250 MG: at 17:03

## 2017-07-30 RX ADMIN — MAGNESIUM SULFATE HEPTAHYDRATE 4 G: 40 INJECTION, SOLUTION INTRAVENOUS at 02:41

## 2017-07-30 RX ADMIN — SODIUM HYPOCHLORITE: 2.5 SOLUTION TOPICAL at 17:03

## 2017-07-30 RX ADMIN — Medication 1 TABLET: at 12:17

## 2017-07-30 RX ADMIN — DULOXETINE HYDROCHLORIDE 60 MG: 60 CAPSULE, DELAYED RELEASE ORAL at 08:46

## 2017-07-30 RX ADMIN — SODIUM CHLORIDE 75 ML/HR: 9 INJECTION, SOLUTION INTRAVENOUS at 20:50

## 2017-07-30 RX ADMIN — SODIUM HYPOCHLORITE: 2.5 SOLUTION TOPICAL at 06:21

## 2017-07-30 RX ADMIN — POTASSIUM CHLORIDE 40 MEQ: 1500 TABLET, EXTENDED RELEASE ORAL at 00:52

## 2017-07-30 RX ADMIN — SUCRALFATE 1 G: 1 TABLET ORAL at 17:03

## 2017-07-30 RX ADMIN — CASTOR OIL AND BALSAM, PERU: 788; 87 OINTMENT TOPICAL at 09:14

## 2017-07-30 RX ADMIN — TOPIRAMATE 100 MG: 100 TABLET, FILM COATED ORAL at 20:49

## 2017-07-30 RX ADMIN — MODAFINIL 200 MG: 100 TABLET ORAL at 08:47

## 2017-07-30 RX ADMIN — TOPIRAMATE 100 MG: 100 TABLET, FILM COATED ORAL at 08:47

## 2017-07-30 RX ADMIN — NYSTATIN: 100000 POWDER TOPICAL at 00:52

## 2017-07-30 RX ADMIN — SODIUM CHLORIDE 75 ML/HR: 9 INJECTION, SOLUTION INTRAVENOUS at 09:12

## 2017-07-30 RX ADMIN — CASTOR OIL AND BALSAM, PERU: 788; 87 OINTMENT TOPICAL at 20:51

## 2017-07-30 RX ADMIN — CHOLECALCIFEROL CAP 125 MCG (5000 UNIT) 5000 UNITS: 125 CAP at 08:46

## 2017-07-30 RX ADMIN — MEROPENEM 1 G: 1 INJECTION, POWDER, FOR SOLUTION INTRAVENOUS at 20:49

## 2017-07-30 RX ADMIN — MEROPENEM 1 G: 1 INJECTION, POWDER, FOR SOLUTION INTRAVENOUS at 14:13

## 2017-07-30 RX ADMIN — PANTOPRAZOLE SODIUM 40 MG: 40 TABLET, DELAYED RELEASE ORAL at 06:20

## 2017-07-30 RX ADMIN — PROMETHAZINE HYDROCHLORIDE 12.5 MG: 25 INJECTION INTRAMUSCULAR; INTRAVENOUS at 12:52

## 2017-07-30 RX ADMIN — VANCOMYCIN HYDROCHLORIDE 2000 MG: 5 INJECTION, POWDER, LYOPHILIZED, FOR SOLUTION INTRAVENOUS at 00:53

## 2017-07-30 RX ADMIN — GABAPENTIN 400 MG: 400 CAPSULE ORAL at 06:20

## 2017-07-30 RX ADMIN — MEROPENEM 1 G: 1 INJECTION, POWDER, FOR SOLUTION INTRAVENOUS at 06:20

## 2017-07-30 RX ADMIN — DULOXETINE HYDROCHLORIDE 60 MG: 60 CAPSULE, DELAYED RELEASE ORAL at 17:02

## 2017-07-30 RX ADMIN — SUCRALFATE 1 G: 1 TABLET ORAL at 08:46

## 2017-07-30 RX ADMIN — HEPARIN SODIUM 5000 UNITS: 5000 INJECTION, SOLUTION INTRAVENOUS; SUBCUTANEOUS at 14:13

## 2017-07-31 ENCOUNTER — APPOINTMENT (OUTPATIENT)
Dept: MRI IMAGING | Facility: HOSPITAL | Age: 42
End: 2017-07-31

## 2017-07-31 LAB
25(OH)D3 SERPL-MCNC: 17 NG/ML
ALBUMIN SERPL-MCNC: 3.1 G/DL (ref 3.5–5)
ALBUMIN/GLOB SERPL: 0.8 G/DL (ref 1.5–2.5)
ALP SERPL-CCNC: 165 U/L (ref 40–129)
ALT SERPL W P-5'-P-CCNC: 54 U/L (ref 10–44)
ANION GAP SERPL CALCULATED.3IONS-SCNC: 7.3 MMOL/L (ref 3.6–11.2)
AST SERPL-CCNC: 58 U/L (ref 10–34)
BACTERIA SPEC AEROBE CULT: ABNORMAL
BASOPHILS # BLD AUTO: 0.02 10*3/MM3 (ref 0–0.3)
BASOPHILS NFR BLD AUTO: 0.2 % (ref 0–2)
BILIRUB SERPL-MCNC: 0.7 MG/DL (ref 0.2–1.8)
BUN BLD-MCNC: 9 MG/DL (ref 7–21)
BUN/CREAT SERPL: 17.3 (ref 7–25)
CALCIUM SPEC-SCNC: 8.8 MG/DL (ref 7.7–10)
CHLORIDE SERPL-SCNC: 111 MMOL/L (ref 99–112)
CO2 SERPL-SCNC: 20.7 MMOL/L (ref 24.3–31.9)
CREAT BLD-MCNC: 0.52 MG/DL (ref 0.43–1.29)
CRP SERPL-MCNC: 16.24 MG/DL (ref 0–0.99)
DEPRECATED RDW RBC AUTO: 44.7 FL (ref 37–54)
EOSINOPHIL # BLD AUTO: 1.02 10*3/MM3 (ref 0–0.7)
EOSINOPHIL NFR BLD AUTO: 9.5 % (ref 0–5)
ERYTHROCYTE [DISTWIDTH] IN BLOOD BY AUTOMATED COUNT: 14.1 % (ref 11.5–14.5)
FERRITIN SERPL-MCNC: 492 NG/ML (ref 21.9–321.7)
FOLATE SERPL-MCNC: 14.01 NG/ML (ref 5.4–20)
GFR SERPL CREATININE-BSD FRML MDRD: >150 ML/MIN/1.73
GLOBULIN UR ELPH-MCNC: 3.8 GM/DL
GLUCOSE BLD-MCNC: 97 MG/DL (ref 70–110)
GLUCOSE BLDC GLUCOMTR-MCNC: 81 MG/DL (ref 70–130)
GLUCOSE BLDC GLUCOMTR-MCNC: 84 MG/DL (ref 70–130)
HCT VFR BLD AUTO: 29.2 % (ref 42–52)
HGB BLD-MCNC: 9.1 G/DL (ref 14–18)
IMM GRANULOCYTES # BLD: 0.17 10*3/MM3 (ref 0–0.03)
IMM GRANULOCYTES NFR BLD: 1.6 % (ref 0–0.5)
IRON 24H UR-MRATE: 26 MCG/DL (ref 53–167)
IRON SATN MFR SERPL: 11 % (ref 20–50)
LYMPHOCYTES # BLD AUTO: 1.6 10*3/MM3 (ref 1–3)
LYMPHOCYTES NFR BLD AUTO: 14.9 % (ref 21–51)
MAGNESIUM SERPL-MCNC: 1.9 MG/DL (ref 1.7–2.6)
MCH RBC QN AUTO: 27.8 PG (ref 27–33)
MCHC RBC AUTO-ENTMCNC: 31.2 G/DL (ref 33–37)
MCV RBC AUTO: 89.3 FL (ref 80–94)
MONOCYTES # BLD AUTO: 0.51 10*3/MM3 (ref 0.1–0.9)
MONOCYTES NFR BLD AUTO: 4.7 % (ref 0–10)
NEUTROPHILS # BLD AUTO: 7.43 10*3/MM3 (ref 1.4–6.5)
NEUTROPHILS NFR BLD AUTO: 69.1 % (ref 30–70)
OSMOLALITY SERPL CALC.SUM OF ELEC: 276.1 MOSM/KG (ref 273–305)
PHOSPHATE SERPL-MCNC: 2.8 MG/DL (ref 2.7–4.5)
PLATELET # BLD AUTO: 437 10*3/MM3 (ref 130–400)
PMV BLD AUTO: 9.6 FL (ref 6–10)
POTASSIUM BLD-SCNC: 3.5 MMOL/L (ref 3.5–5.3)
PROT SERPL-MCNC: 6.9 G/DL (ref 6–8)
RBC # BLD AUTO: 3.27 10*6/MM3 (ref 4.7–6.1)
SODIUM BLD-SCNC: 139 MMOL/L (ref 135–153)
TIBC SERPL-MCNC: 236 MCG/DL (ref 241–421)
VIT B12 BLD-MCNC: 630 PG/ML (ref 211–911)
WBC NRBC COR # BLD: 10.75 10*3/MM3 (ref 4.5–12.5)

## 2017-07-31 PROCEDURE — 82728 ASSAY OF FERRITIN: CPT | Performed by: INTERNAL MEDICINE

## 2017-07-31 PROCEDURE — 25010000002 VANCOMYCIN PER 500 MG

## 2017-07-31 PROCEDURE — 84100 ASSAY OF PHOSPHORUS: CPT | Performed by: INTERNAL MEDICINE

## 2017-07-31 PROCEDURE — 82746 ASSAY OF FOLIC ACID SERUM: CPT | Performed by: INTERNAL MEDICINE

## 2017-07-31 PROCEDURE — 82962 GLUCOSE BLOOD TEST: CPT

## 2017-07-31 PROCEDURE — 82306 VITAMIN D 25 HYDROXY: CPT | Performed by: INTERNAL MEDICINE

## 2017-07-31 PROCEDURE — 25010000002 LEVOFLOXACIN PER 250 MG: Performed by: INTERNAL MEDICINE

## 2017-07-31 PROCEDURE — 94660 CPAP INITIATION&MGMT: CPT

## 2017-07-31 PROCEDURE — 83550 IRON BINDING TEST: CPT | Performed by: INTERNAL MEDICINE

## 2017-07-31 PROCEDURE — 99233 SBSQ HOSP IP/OBS HIGH 50: CPT | Performed by: INTERNAL MEDICINE

## 2017-07-31 PROCEDURE — 83735 ASSAY OF MAGNESIUM: CPT | Performed by: INTERNAL MEDICINE

## 2017-07-31 PROCEDURE — 25010000002 HEPARIN (PORCINE) PER 1000 UNITS: Performed by: INTERNAL MEDICINE

## 2017-07-31 PROCEDURE — 80053 COMPREHEN METABOLIC PANEL: CPT | Performed by: INTERNAL MEDICINE

## 2017-07-31 PROCEDURE — 94799 UNLISTED PULMONARY SVC/PX: CPT

## 2017-07-31 PROCEDURE — 72195 MRI PELVIS W/O DYE: CPT

## 2017-07-31 PROCEDURE — 86140 C-REACTIVE PROTEIN: CPT | Performed by: INTERNAL MEDICINE

## 2017-07-31 PROCEDURE — 25010000002 MEROPENEM

## 2017-07-31 PROCEDURE — 85025 COMPLETE CBC W/AUTO DIFF WBC: CPT | Performed by: INTERNAL MEDICINE

## 2017-07-31 PROCEDURE — 25010000003 POTASSIUM CHLORIDE 10 MEQ/100ML SOLUTION: Performed by: HOSPITALIST

## 2017-07-31 PROCEDURE — 83540 ASSAY OF IRON: CPT | Performed by: INTERNAL MEDICINE

## 2017-07-31 PROCEDURE — 82607 VITAMIN B-12: CPT | Performed by: INTERNAL MEDICINE

## 2017-07-31 PROCEDURE — 99221 1ST HOSP IP/OBS SF/LOW 40: CPT | Performed by: PHYSICIAN ASSISTANT

## 2017-07-31 PROCEDURE — 72195 MRI PELVIS W/O DYE: CPT | Performed by: RADIOLOGY

## 2017-07-31 RX ORDER — MAGNESIUM SULFATE HEPTAHYDRATE 40 MG/ML
4 INJECTION, SOLUTION INTRAVENOUS ONCE
Status: COMPLETED | OUTPATIENT
Start: 2017-07-31 | End: 2017-07-31

## 2017-07-31 RX ORDER — LEVOFLOXACIN 5 MG/ML
500 INJECTION, SOLUTION INTRAVENOUS EVERY 24 HOURS
Status: DISCONTINUED | OUTPATIENT
Start: 2017-07-31 | End: 2017-08-04

## 2017-07-31 RX ORDER — SODIUM HYPOCHLORITE 2.5 MG/ML
SOLUTION TOPICAL EVERY 12 HOURS
Status: DISCONTINUED | OUTPATIENT
Start: 2017-07-31 | End: 2017-07-31

## 2017-07-31 RX ORDER — SODIUM HYPOCHLORITE 2.5 MG/ML
SOLUTION TOPICAL EVERY 12 HOURS
Status: DISCONTINUED | OUTPATIENT
Start: 2017-07-31 | End: 2017-08-04 | Stop reason: HOSPADM

## 2017-07-31 RX ORDER — POTASSIUM CHLORIDE 7.45 MG/ML
10 INJECTION INTRAVENOUS
Status: COMPLETED | OUTPATIENT
Start: 2017-07-31 | End: 2017-07-31

## 2017-07-31 RX ADMIN — GABAPENTIN 400 MG: 400 CAPSULE ORAL at 14:00

## 2017-07-31 RX ADMIN — Medication 250 MG: at 17:17

## 2017-07-31 RX ADMIN — CASTOR OIL AND BALSAM, PERU: 788; 87 OINTMENT TOPICAL at 08:35

## 2017-07-31 RX ADMIN — Medication 1 TABLET: at 08:34

## 2017-07-31 RX ADMIN — NYSTATIN: 100000 POWDER TOPICAL at 20:45

## 2017-07-31 RX ADMIN — TOPIRAMATE 100 MG: 100 TABLET, FILM COATED ORAL at 08:33

## 2017-07-31 RX ADMIN — MAGNESIUM SULFATE HEPTAHYDRATE 4 G: 40 INJECTION, SOLUTION INTRAVENOUS at 08:33

## 2017-07-31 RX ADMIN — DULOXETINE HYDROCHLORIDE 60 MG: 60 CAPSULE, DELAYED RELEASE ORAL at 08:34

## 2017-07-31 RX ADMIN — PANTOPRAZOLE SODIUM 40 MG: 40 TABLET, DELAYED RELEASE ORAL at 05:25

## 2017-07-31 RX ADMIN — DULOXETINE HYDROCHLORIDE 60 MG: 60 CAPSULE, DELAYED RELEASE ORAL at 17:17

## 2017-07-31 RX ADMIN — CHOLECALCIFEROL CAP 125 MCG (5000 UNIT) 5000 UNITS: 125 CAP at 08:34

## 2017-07-31 RX ADMIN — ACETAMINOPHEN 650 MG: 325 TABLET ORAL at 20:43

## 2017-07-31 RX ADMIN — SUCRALFATE 1 G: 1 TABLET ORAL at 20:44

## 2017-07-31 RX ADMIN — SODIUM HYPOCHLORITE: 2.5 SOLUTION TOPICAL at 20:45

## 2017-07-31 RX ADMIN — NYSTATIN: 100000 POWDER TOPICAL at 08:33

## 2017-07-31 RX ADMIN — MEROPENEM 1 G: 1 INJECTION, POWDER, FOR SOLUTION INTRAVENOUS at 05:25

## 2017-07-31 RX ADMIN — MODAFINIL 200 MG: 100 TABLET ORAL at 08:34

## 2017-07-31 RX ADMIN — LEVOFLOXACIN 500 MG: 5 INJECTION, SOLUTION INTRAVENOUS at 08:35

## 2017-07-31 RX ADMIN — SUCRALFATE 1 G: 1 TABLET ORAL at 17:17

## 2017-07-31 RX ADMIN — HEPARIN SODIUM 5000 UNITS: 5000 INJECTION, SOLUTION INTRAVENOUS; SUBCUTANEOUS at 14:00

## 2017-07-31 RX ADMIN — POTASSIUM CHLORIDE 10 MEQ: 7.46 INJECTION, SOLUTION INTRAVENOUS at 14:00

## 2017-07-31 RX ADMIN — SUCRALFATE 1 G: 1 TABLET ORAL at 11:10

## 2017-07-31 RX ADMIN — TOPIRAMATE 100 MG: 100 TABLET, FILM COATED ORAL at 20:46

## 2017-07-31 RX ADMIN — TOPIRAMATE 100 MG: 100 TABLET, FILM COATED ORAL at 15:28

## 2017-07-31 RX ADMIN — CASTOR OIL AND BALSAM, PERU: 788; 87 OINTMENT TOPICAL at 20:45

## 2017-07-31 RX ADMIN — POTASSIUM CHLORIDE 10 MEQ: 7.46 INJECTION, SOLUTION INTRAVENOUS at 12:33

## 2017-07-31 RX ADMIN — Medication 250 MG: at 08:33

## 2017-07-31 RX ADMIN — METHENAMINE HIPPURATE 1 G: 1 TABLET ORAL at 08:34

## 2017-07-31 RX ADMIN — VANCOMYCIN HYDROCHLORIDE 2000 MG: 5 INJECTION, POWDER, LYOPHILIZED, FOR SOLUTION INTRAVENOUS at 23:49

## 2017-07-31 RX ADMIN — VANCOMYCIN HYDROCHLORIDE 2000 MG: 5 INJECTION, POWDER, LYOPHILIZED, FOR SOLUTION INTRAVENOUS at 11:10

## 2017-07-31 RX ADMIN — GABAPENTIN 400 MG: 400 CAPSULE ORAL at 05:25

## 2017-07-31 RX ADMIN — HEPARIN SODIUM 5000 UNITS: 5000 INJECTION, SOLUTION INTRAVENOUS; SUBCUTANEOUS at 05:50

## 2017-07-31 RX ADMIN — GABAPENTIN 400 MG: 400 CAPSULE ORAL at 20:44

## 2017-07-31 RX ADMIN — POTASSIUM CHLORIDE 10 MEQ: 7.46 INJECTION, SOLUTION INTRAVENOUS at 15:27

## 2017-07-31 RX ADMIN — VANCOMYCIN HYDROCHLORIDE 2000 MG: 5 INJECTION, POWDER, LYOPHILIZED, FOR SOLUTION INTRAVENOUS at 00:04

## 2017-07-31 RX ADMIN — POTASSIUM CHLORIDE 10 MEQ: 7.46 INJECTION, SOLUTION INTRAVENOUS at 11:28

## 2017-07-31 RX ADMIN — HEPARIN SODIUM 5000 UNITS: 5000 INJECTION, SOLUTION INTRAVENOUS; SUBCUTANEOUS at 20:44

## 2017-07-31 RX ADMIN — SUCRALFATE 1 G: 1 TABLET ORAL at 08:34

## 2017-07-31 RX ADMIN — METHENAMINE HIPPURATE 1 G: 1 TABLET ORAL at 20:45

## 2017-08-01 ENCOUNTER — APPOINTMENT (OUTPATIENT)
Dept: CT IMAGING | Facility: HOSPITAL | Age: 42
End: 2017-08-01

## 2017-08-01 ENCOUNTER — APPOINTMENT (OUTPATIENT)
Dept: ULTRASOUND IMAGING | Facility: HOSPITAL | Age: 42
End: 2017-08-01

## 2017-08-01 LAB
ALBUMIN SERPL-MCNC: 3.2 G/DL (ref 3.5–5)
ALBUMIN/GLOB SERPL: 0.8 G/DL (ref 1.5–2.5)
ALP SERPL-CCNC: 173 U/L (ref 40–129)
ALT SERPL W P-5'-P-CCNC: 49 U/L (ref 10–44)
ANION GAP SERPL CALCULATED.3IONS-SCNC: 7.4 MMOL/L (ref 3.6–11.2)
APPEARANCE FLD: CLEAR
AST SERPL-CCNC: 37 U/L (ref 10–34)
BASOPHILS # BLD AUTO: 0.05 10*3/MM3 (ref 0–0.3)
BASOPHILS NFR BLD AUTO: 0.4 % (ref 0–2)
BILIRUB SERPL-MCNC: 0.5 MG/DL (ref 0.2–1.8)
BUN BLD-MCNC: 10 MG/DL (ref 7–21)
BUN/CREAT SERPL: 15.9 (ref 7–25)
CALCIUM SPEC-SCNC: 9.1 MG/DL (ref 7.7–10)
CHLORIDE SERPL-SCNC: 108 MMOL/L (ref 99–112)
CO2 SERPL-SCNC: 21.6 MMOL/L (ref 24.3–31.9)
COLOR FLD: COLORLESS
CREAT BLD-MCNC: 0.63 MG/DL (ref 0.43–1.29)
CRP SERPL-MCNC: 14.28 MG/DL (ref 0–0.99)
DEPRECATED RDW RBC AUTO: 44.7 FL (ref 37–54)
EOSINOPHIL # BLD AUTO: 0.95 10*3/MM3 (ref 0–0.7)
EOSINOPHIL NFR BLD AUTO: 7 % (ref 0–5)
ERYTHROCYTE [DISTWIDTH] IN BLOOD BY AUTOMATED COUNT: 14 % (ref 11.5–14.5)
GFR SERPL CREATININE-BSD FRML MDRD: 142 ML/MIN/1.73
GLOBULIN UR ELPH-MCNC: 3.9 GM/DL
GLUCOSE BLD-MCNC: 131 MG/DL (ref 70–110)
HCT VFR BLD AUTO: 29.5 % (ref 42–52)
HGB BLD-MCNC: 9.1 G/DL (ref 14–18)
IMM GRANULOCYTES # BLD: 0.27 10*3/MM3 (ref 0–0.03)
IMM GRANULOCYTES NFR BLD: 2 % (ref 0–0.5)
KOH PREP NAIL: NORMAL
LYMPHOCYTES # BLD AUTO: 2.09 10*3/MM3 (ref 1–3)
LYMPHOCYTES NFR BLD AUTO: 15.4 % (ref 21–51)
MAGNESIUM SERPL-MCNC: 1.7 MG/DL (ref 1.7–2.6)
MCH RBC QN AUTO: 26.9 PG (ref 27–33)
MCHC RBC AUTO-ENTMCNC: 30.8 G/DL (ref 33–37)
MCV RBC AUTO: 87.3 FL (ref 80–94)
MONOCYTES # BLD AUTO: 0.6 10*3/MM3 (ref 0.1–0.9)
MONOCYTES NFR BLD AUTO: 4.4 % (ref 0–10)
NEUTROPHILS # BLD AUTO: 9.58 10*3/MM3 (ref 1.4–6.5)
NEUTROPHILS NFR BLD AUTO: 70.8 % (ref 30–70)
NRBC BLD MANUAL-RTO: 0 /100 WBC (ref 0–0)
NUC CELL # FLD: 0 /MM3
OSMOLALITY SERPL CALC.SUM OF ELEC: 274.7 MOSM/KG (ref 273–305)
PHOSPHATE SERPL-MCNC: 2.8 MG/DL (ref 2.7–4.5)
PLATELET # BLD AUTO: 499 10*3/MM3 (ref 130–400)
PMV BLD AUTO: 10 FL (ref 6–10)
POTASSIUM BLD-SCNC: 3.3 MMOL/L (ref 3.5–5.3)
POTASSIUM BLD-SCNC: 3.3 MMOL/L (ref 3.5–5.3)
POTASSIUM BLD-SCNC: 3.8 MMOL/L (ref 3.5–5.3)
PROT SERPL-MCNC: 7.1 G/DL (ref 6–8)
RBC # BLD AUTO: 3.38 10*6/MM3 (ref 4.7–6.1)
RBC # FLD AUTO: NORMAL /MM3
SODIUM BLD-SCNC: 137 MMOL/L (ref 135–153)
WBC NRBC COR # BLD: 13.54 10*3/MM3 (ref 4.5–12.5)

## 2017-08-01 PROCEDURE — 25010000002 LEVOFLOXACIN PER 250 MG: Performed by: INTERNAL MEDICINE

## 2017-08-01 PROCEDURE — 93005 ELECTROCARDIOGRAM TRACING: CPT | Performed by: INTERNAL MEDICINE

## 2017-08-01 PROCEDURE — 85025 COMPLETE CBC W/AUTO DIFF WBC: CPT | Performed by: INTERNAL MEDICINE

## 2017-08-01 PROCEDURE — 87015 SPECIMEN INFECT AGNT CONCNTJ: CPT | Performed by: HOSPITALIST

## 2017-08-01 PROCEDURE — 84132 ASSAY OF SERUM POTASSIUM: CPT | Performed by: INTERNAL MEDICINE

## 2017-08-01 PROCEDURE — 87070 CULTURE OTHR SPECIMN AEROBIC: CPT | Performed by: HOSPITALIST

## 2017-08-01 PROCEDURE — 87075 CULTR BACTERIA EXCEPT BLOOD: CPT | Performed by: HOSPITALIST

## 2017-08-01 PROCEDURE — 84100 ASSAY OF PHOSPHORUS: CPT | Performed by: INTERNAL MEDICINE

## 2017-08-01 PROCEDURE — 80053 COMPREHEN METABOLIC PANEL: CPT | Performed by: INTERNAL MEDICINE

## 2017-08-01 PROCEDURE — 86140 C-REACTIVE PROTEIN: CPT | Performed by: INTERNAL MEDICINE

## 2017-08-01 PROCEDURE — 77012 CT SCAN FOR NEEDLE BIOPSY: CPT | Performed by: RADIOLOGY

## 2017-08-01 PROCEDURE — 25010000002 HEPARIN (PORCINE) PER 1000 UNITS: Performed by: INTERNAL MEDICINE

## 2017-08-01 PROCEDURE — 87205 SMEAR GRAM STAIN: CPT | Performed by: HOSPITALIST

## 2017-08-01 PROCEDURE — 93010 ELECTROCARDIOGRAM REPORT: CPT | Performed by: INTERNAL MEDICINE

## 2017-08-01 PROCEDURE — 94799 UNLISTED PULMONARY SVC/PX: CPT

## 2017-08-01 PROCEDURE — 84132 ASSAY OF SERUM POTASSIUM: CPT | Performed by: HOSPITALIST

## 2017-08-01 PROCEDURE — 20610 DRAIN/INJ JOINT/BURSA W/O US: CPT | Performed by: RADIOLOGY

## 2017-08-01 PROCEDURE — 87220 TISSUE EXAM FOR FUNGI: CPT | Performed by: HOSPITALIST

## 2017-08-01 PROCEDURE — 89050 BODY FLUID CELL COUNT: CPT | Performed by: HOSPITALIST

## 2017-08-01 PROCEDURE — 84157 ASSAY OF PROTEIN OTHER: CPT | Performed by: HOSPITALIST

## 2017-08-01 PROCEDURE — 94660 CPAP INITIATION&MGMT: CPT

## 2017-08-01 PROCEDURE — 83735 ASSAY OF MAGNESIUM: CPT | Performed by: INTERNAL MEDICINE

## 2017-08-01 PROCEDURE — 25010000002 VANCOMYCIN PER 500 MG

## 2017-08-01 PROCEDURE — 0S993ZX DRAINAGE OF RIGHT HIP JOINT, PERCUTANEOUS APPROACH, DIAGNOSTIC: ICD-10-PCS | Performed by: ORTHOPAEDIC SURGERY

## 2017-08-01 PROCEDURE — 99233 SBSQ HOSP IP/OBS HIGH 50: CPT | Performed by: INTERNAL MEDICINE

## 2017-08-01 RX ORDER — POTASSIUM CHLORIDE 20 MEQ/1
40 TABLET, EXTENDED RELEASE ORAL EVERY 4 HOURS
Status: COMPLETED | OUTPATIENT
Start: 2017-08-01 | End: 2017-08-01

## 2017-08-01 RX ORDER — MAGNESIUM SULFATE HEPTAHYDRATE 40 MG/ML
4 INJECTION, SOLUTION INTRAVENOUS ONCE
Status: COMPLETED | OUTPATIENT
Start: 2017-08-01 | End: 2017-08-01

## 2017-08-01 RX ADMIN — Medication 1 TABLET: at 12:21

## 2017-08-01 RX ADMIN — HEPARIN SODIUM 5000 UNITS: 5000 INJECTION, SOLUTION INTRAVENOUS; SUBCUTANEOUS at 05:36

## 2017-08-01 RX ADMIN — SUCRALFATE 1 G: 1 TABLET ORAL at 12:23

## 2017-08-01 RX ADMIN — TOPIRAMATE 100 MG: 100 TABLET, FILM COATED ORAL at 17:50

## 2017-08-01 RX ADMIN — MODAFINIL 200 MG: 100 TABLET ORAL at 12:40

## 2017-08-01 RX ADMIN — SUCRALFATE 1 G: 1 TABLET ORAL at 17:50

## 2017-08-01 RX ADMIN — HEPARIN SODIUM 5000 UNITS: 5000 INJECTION, SOLUTION INTRAVENOUS; SUBCUTANEOUS at 21:38

## 2017-08-01 RX ADMIN — POTASSIUM CHLORIDE 40 MEQ: 1500 TABLET, EXTENDED RELEASE ORAL at 12:21

## 2017-08-01 RX ADMIN — VANCOMYCIN HYDROCHLORIDE 2000 MG: 5 INJECTION, POWDER, LYOPHILIZED, FOR SOLUTION INTRAVENOUS at 12:24

## 2017-08-01 RX ADMIN — METHENAMINE HIPPURATE 1 G: 1 TABLET ORAL at 21:38

## 2017-08-01 RX ADMIN — SUCRALFATE 1 G: 1 TABLET ORAL at 21:37

## 2017-08-01 RX ADMIN — Medication 250 MG: at 17:50

## 2017-08-01 RX ADMIN — LEVOFLOXACIN 500 MG: 5 INJECTION, SOLUTION INTRAVENOUS at 09:18

## 2017-08-01 RX ADMIN — Medication 250 MG: at 12:22

## 2017-08-01 RX ADMIN — POTASSIUM CHLORIDE 40 MEQ: 1500 TABLET, EXTENDED RELEASE ORAL at 17:50

## 2017-08-01 RX ADMIN — SODIUM HYPOCHLORITE: 2.5 SOLUTION TOPICAL at 21:38

## 2017-08-01 RX ADMIN — TRAZODONE HYDROCHLORIDE 50 MG: 50 TABLET ORAL at 02:28

## 2017-08-01 RX ADMIN — TOPIRAMATE 100 MG: 100 TABLET, FILM COATED ORAL at 12:23

## 2017-08-01 RX ADMIN — TOPIRAMATE 100 MG: 100 TABLET, FILM COATED ORAL at 21:38

## 2017-08-01 RX ADMIN — CHOLECALCIFEROL CAP 125 MCG (5000 UNIT) 5000 UNITS: 125 CAP at 12:21

## 2017-08-01 RX ADMIN — CASTOR OIL AND BALSAM, PERU 1 APPLICATION: 788; 87 OINTMENT TOPICAL at 09:19

## 2017-08-01 RX ADMIN — NYSTATIN 1 APPLICATION: 100000 POWDER TOPICAL at 09:18

## 2017-08-01 RX ADMIN — CASTOR OIL AND BALSAM, PERU: 788; 87 OINTMENT TOPICAL at 21:38

## 2017-08-01 RX ADMIN — METHENAMINE HIPPURATE 1 G: 1 TABLET ORAL at 12:22

## 2017-08-01 RX ADMIN — GABAPENTIN 400 MG: 400 CAPSULE ORAL at 21:38

## 2017-08-01 RX ADMIN — DULOXETINE HYDROCHLORIDE 60 MG: 60 CAPSULE, DELAYED RELEASE ORAL at 17:50

## 2017-08-01 RX ADMIN — GABAPENTIN 400 MG: 400 CAPSULE ORAL at 05:36

## 2017-08-01 RX ADMIN — MAGNESIUM SULFATE HEPTAHYDRATE 4 G: 40 INJECTION, SOLUTION INTRAVENOUS at 03:12

## 2017-08-01 RX ADMIN — NYSTATIN: 100000 POWDER TOPICAL at 21:38

## 2017-08-01 RX ADMIN — SODIUM HYPOCHLORITE 1 ML: 2.5 SOLUTION TOPICAL at 09:17

## 2017-08-01 RX ADMIN — GABAPENTIN 400 MG: 400 CAPSULE ORAL at 13:57

## 2017-08-01 RX ADMIN — PANTOPRAZOLE SODIUM 40 MG: 40 TABLET, DELAYED RELEASE ORAL at 05:36

## 2017-08-01 RX ADMIN — DULOXETINE HYDROCHLORIDE 60 MG: 60 CAPSULE, DELAYED RELEASE ORAL at 12:22

## 2017-08-01 NOTE — PROGRESS NOTES
Have reviewed MRI of right hip and pelvis.  Does show evidence of old femoral neck fracture questionable avascular necrosis.  Joint effusion is noted.  Have discussed with radiologist that this may or may not be consistent with osteomyelitis versus a chronic femoral neck nonunion.  We'll plan to have the joint aspirated today by interventional radiology.  If evidence of infection would proceed with surgical intervention tomorrow essentially a Girdlestone procedure of the right hip.  If aspiration is sterile would continue to treat with local wound care and IV antibiotics.

## 2017-08-01 NOTE — PROGRESS NOTES
"  I have personally seen and examined the patient today and discussed overnight interval progress and pertinent issues with nursing staff.    Subjective    Going for surgery today.  Improved CRP level.  No complaints no issues reported by his nurse.      Past Medical History:   Diagnosis Date   • Asthma    • Cancer     skin cancer on right arm   • Diabetes mellitus    • History of transfusion    • Hyperlipidemia    • Hypertension    • Paraplegia    • Sleep apnea        History reviewed. No pertinent family history.    Social History     Social History   • Marital status:      Spouse name: N/A   • Number of children: N/A   • Years of education: N/A     Occupational History   • Not on file.     Social History Main Topics   • Smoking status: Never Smoker   • Smokeless tobacco: Not on file   • Alcohol use Yes      Comment: occassional    • Drug use: Yes     Special: Marijuana   • Sexual activity: Defer     Other Topics Concern   • Not on file     Social History Narrative         ALLERGIES:    Allergies   Allergen Reactions   • Keflex [Cephalexin] Rash     Patient states \"red man syndrome\"         History taken from: patient chart RN      Vital Signs    /75 (BP Location: Left arm, Patient Position: Lying)  Pulse 74  Temp 97.9 °F (36.6 °C) (Oral)   Resp 20  Ht 71\" (180.3 cm)  Wt 268 lb 9.6 oz (122 kg)  SpO2 95%  BMI 37.46 kg/m2    Temp:  [97.6 °F (36.4 °C)-98.3 °F (36.8 °C)] 97.9 °F (36.6 °C)      Intake/Output Summary (Last 24 hours) at 08/01/17 1045  Last data filed at 08/01/17 1013   Gross per 24 hour   Intake             1171 ml   Output             5100 ml   Net            -3929 ml     Intake & Output (last 3 days)       07/29 0701 - 07/30 0700 07/30 0701 - 07/31 0700 07/31 0701 - 08/01 0700 08/01 0701 - 08/02 0700    P.O. 1230 840 570     I.V. (mL/kg) 2486.9 (21)       IV Piggyback 1800 650 500 100    Irrigation   1     Total Intake(mL/kg) 5516.9 (46.6) 1490 (12.6) 1071 (8.8) 100 (0.8)    Urine " (mL/kg/hr) 3485 (1.2) 3300 (1.2) 4300 (1.5) 1175 (2.6)    Stool 1200 (0.4) 0 (0) 1100 (0.4) 200 (0.4)    Total Output 4685 3300 5400 1375    Net +831.9 -1810 -4329 -1275            Unmeasured Stool Occurrence  0 x          Physical Exam:       General Appearance:    Lethargic, cooperative, in no acute distress   Head:    Normocephalic, without obvious abnormality, atraumatic   Eyes:            Lids and lashes normal, conjunctivae and sclerae normal, no   icterus, no pallor, corneas clear, PERRLA   Ears:    Ears appear intact with no abnormalities noted   Throat:   No oral lesions, no thrush, oral mucosa moist   Neck:   No adenopathy, supple, trachea midline, no thyromegaly, no   carotid bruit, no JVD   Back:     No tenderness to percussion or palpation, range of motion   normal   Lungs:     Clear to auscultation,respirations regular, even and unlabored. No wheezing, no ronchi and no crackles.    Heart:    Regular rhythm and normal rate, normal S1 and S2, no            murmur, no gallop, no rub, no click   Chest Wall:    No abnormalities observed   Abdomen:     Positive for colostomy and urostomy Normal bowel sounds, no masses, no organomegaly, soft, non-tender, non-distended, no guarding, no rebound                tenderness   Rectal:     Deferred   Extremities:  Paraplegia    Pulses:   Pulses palpable and equal bilaterally   Skin:   Right buttocks ulcer with wound VAC in place and surrounding erythema as well as warmth.     Lymph nodes:   No palpable adenopathy   Neurologic:   Awake, lethargic. Following commands.Paraplegia              Results:      Results from last 7 days  Lab Units 08/01/17 0058 07/31/17 0447 07/30/17  0111 07/28/17  2314   WBC 10*3/mm3 13.54* 10.75 12.46 24.12*     Lab Results   Component Value Date    NEUTROABS 9.58 (H) 08/01/2017         Results from last 7 days  Lab Units 08/01/17 0058   CREATININE mg/dL 0.63         Results from last 7 days  Lab Units 08/01/17 0058 07/31/17  0447  07/30/17  0111   CRP mg/dL 14.28* 16.24* 27.31*       Imaging Results (last 24 hours)     Procedure Component Value Units Date/Time    MRI Pelvis Without Contrast [[id number]] Collected:  07/31/17 1426     Updated:  07/31/17 1500    Narrative:       MRI PELVIS WITHOUT CONTRAST-     REASON FOR EXAM:  Osteomyelitis ischial tuberosity, rule out  femoroacetabular involvement; A41.9-Sepsis, unspecified organism;  M86.18-Other acute osteomyelitis, other site.     Scans were obtained through the pelvis in the axial, coronal and  sagittal planes using T1 and T2 sequences. Fat suppression was utilized.  There is some motion artifact on the exam.     MRI FINDINGS: There is abnormal signal noted in the femoral head and  neck area on the right. This would be consistent with avascular  necrosis. There is evidence of an old fracture involving the femoral  neck. There is some superior subluxation of the intratrochanteric  portion of the femur. There is fluid in the joint space around the hip.  There is abnormal signal intensity noted in the marrow of the femoral  head and in the intertrochanteric portion of the proximal femur. There  is some edema in the muscle planes around the hip as well.       Impression:       Abnormal MRI of the pelvis. There is evidence of an old  fracture involving the femoral neck. There is a fairly large effusion in  the joint space of the hip. There is abnormal marrow signal in the  femoral head and to a lesser degree in the intertrochanteric portion of  the proximal femur. There is also evidence of edema in the muscle planes  around the hip. This would be consistent with osteomyelitis.     This report was finalized on 7/31/2017 2:58 PM by Dr. Robinson Hardy II, MD.               Results Review:    I have personally reviewed laboratory data, culture results, radiology studies and antimicrobial therapy.    Hospital Medications (active)       Dose Frequency Start End    acetaminophen (TYLENOL) tablet 650  mg 650 mg Every 6 Hours PRN 7/29/2017     Sig - Route: Take 2 tablets by mouth Every 6 (Six) Hours As Needed for Mild Pain (1-3). - Oral    acetaminophen (TYLENOL) tablet 650 mg 650 mg Every 6 Hours PRN 7/29/2017     Sig - Route: Take 2 tablets by mouth Every 6 (Six) Hours As Needed for Mild Pain (1-3). - Oral    baclofen (LIORESAL) tablet 30 mg 30 mg Every 8 Hours PRN 7/29/2017     Sig - Route: Take 3 tablets by mouth Every 8 (Eight) Hours As Needed for Muscle Spasms. - Oral    castor oil-balsam peru (VENELEX) ointment  Every 12 Hours Scheduled 7/29/2017     Sig - Route: Apply  topically Every 12 (Twelve) Hours. - Topical    Cosign for Ordering: Required by Carlin Landers MD    dextrose (D50W) solution 25 g 25 g Every 15 Minutes PRN 7/29/2017     Sig - Route: Infuse 50 mL into a venous catheter Every 15 (Fifteen) Minutes As Needed for Low Blood Sugar (Blood Sugar Less Than 70, Patient Has IV Access - Unresponsive, NPO or Unable To Safely Swallow). - Intravenous    dextrose (D50W) solution 25 g 25 g Every 15 Minutes PRN 7/29/2017     Sig - Route: Infuse 50 mL into a venous catheter Every 15 (Fifteen) Minutes As Needed for Low Blood Sugar (Blood Sugar Less Than 70, Patient Has IV Access - Unresponsive, NPO or Unable To Safely Swallow). - Intravenous    dextrose (GLUTOSE) oral gel 15 g 15 g Every 15 Minutes PRN 7/29/2017     Sig - Route: Take 15 g by mouth Every 15 (Fifteen) Minutes As Needed for Low Blood Sugar (Blood Sugar Less Than 70, Patient Alert, Is Not NPO & Can Safely Swallow). - Oral    dextrose (GLUTOSE) oral gel 15 g 15 g Every 15 Minutes PRN 7/29/2017     Sig - Route: Take 15 g by mouth Every 15 (Fifteen) Minutes As Needed for Low Blood Sugar (Blood Sugar Less Than 70, Patient Alert, Is Not NPO & Can Safely Swallow). - Oral    DULoxetine (CYMBALTA) DR capsule 60 mg 60 mg 2 Times Daily 7/29/2017     Sig - Route: Take 1 capsule by mouth 2 (Two) Times a Day. - Oral    gabapentin (NEURONTIN) capsule  "400 mg 400 mg Every 8 Hours Scheduled 7/29/2017     Sig - Route: Take 1 capsule by mouth Every 8 (Eight) Hours. - Oral    glucagon (human recombinant) (GLUCAGEN DIAGNOSTIC) injection 1 mg 1 mg Every 15 Minutes PRN 7/29/2017     Sig - Route: Inject 1 mg under the skin Every 15 (Fifteen) Minutes As Needed (Blood Glucose Less Than 70 - Patient Without IV Access - Unresponsive, NPO or Unable To Safely Swallow). - Subcutaneous    glucagon (human recombinant) (GLUCAGEN DIAGNOSTIC) injection 1 mg 1 mg Every 15 Minutes PRN 7/29/2017     Sig - Route: Inject 1 mg under the skin Every 15 (Fifteen) Minutes As Needed (Blood Glucose Less Than 70 - Patient Without IV Access - Unresponsive, NPO or Unable To Safely Swallow). - Subcutaneous    heparin (porcine) 5000 UNIT/ML injection 5,000 Units 5,000 Units Every 8 Hours Scheduled 7/30/2017     Sig - Route: Inject 1 mL under the skin Every 8 (Eight) Hours. - Subcutaneous    ibuprofen (ADVIL,MOTRIN) tablet 600 mg 600 mg 3 Times Daily PRN 7/29/2017     Sig - Route: Take 1 tablet by mouth 3 (Three) Times a Day As Needed for Mild Pain (1-3). - Oral    insulin aspart (novoLOG) injection 0-7 Units 0-7 Units 4 Times Daily Before Meals & Nightly 7/29/2017     Sig - Route: Inject 0-7 Units under the skin 4 (Four) Times a Day Before Meals & at Bedtime. - Subcutaneous    levoFLOXacin (LEVAQUIN) 500 mg/100 mL D5W (premix) (LEVAQUIN) 500 mg 500 mg Every 24 Hours 7/31/2017     Sig - Route: Infuse 100 mL into a venous catheter Daily. - Intravenous    Magnesium Sulfate 2 gram Bolus, followed by 8 gram infusion (total Mg dose 10 grams)- Mg less than or equal to 1mg/dL 2 g As Needed 7/29/2017     Sig - Route: Infuse 50 mL into a venous catheter As Needed (Mg less than or equal to 1mg/dL). - Intravenous    Linked Group 1:  \"Or\" Linked Group Details        magnesium sulfate 4 gram infusion- Mg 1.6-1.9 mg/dL 4 g As Needed 7/29/2017     Sig - Route: Infuse 100 mL into a venous catheter As Needed (Mg " "1.6-1.9 mg/dL). - Intravenous    Linked Group 1:  \"Or\" Linked Group Details        magnesium sulfate 4 gram infusion- Mg 1.6-1.9 mg/dL 4 g Once 7/31/2017     Sig - Route: Infuse 100 mL into a venous catheter 1 (One) Time. - Intravenous    Magnesium Sulfate 6 gram Infusion (2 gm x 3) -Mg 1.1 -1.5 mg/dL 2 g As Needed 7/29/2017     Sig - Route: Infuse 50 mL into a venous catheter As Needed (Mg 1.1 -1.5 mg/dL). - Intravenous    Linked Group 1:  \"Or\" Linked Group Details        menthol-zinc oxide (CALMOSEPTINE) 0.44-20.625 % ointment  3 Times Daily PRN 7/29/2017     Sig - Route: Apply  topically 3 (Three) Times a Day As Needed (bed sores, crotch, and bottom of stomach). - Topical    methenamine (HIPREX) tablet 1 g 1 g Every 12 Hours Scheduled 7/29/2017     Sig - Route: Take 1 tablet by mouth Every 12 (Twelve) Hours. - Oral    modafinil (PROVIGIL) tablet 200 mg 200 mg Daily 7/29/2017     Sig - Route: Take 2 tablets by mouth Daily. - Oral    multivitamin (DAILY SARAH) tablet 1 tablet 1 tablet Daily 7/30/2017     Sig - Route: Take 1 tablet by mouth Daily. - Oral    nystatin (MYCOSTATIN) powder  Every 12 Hours Scheduled 7/30/2017     Sig - Route: Apply  topically Every 12 (Twelve) Hours. - Topical    pantoprazole (PROTONIX) EC tablet 40 mg 40 mg Every Early Morning 7/29/2017     Sig - Route: Take 1 tablet by mouth Every Morning. - Oral    potassium chloride (KLOR-CON) packet 40 mEq 40 mEq As Needed 7/29/2017     Sig - Route: Take 40 mEq by mouth As Needed (potassium replacement, see admin instructions). - Oral    Linked Group 2:  \"Or\" Linked Group Details        potassium chloride (MICRO-K) CR capsule 40 mEq 40 mEq As Needed 7/29/2017     Sig - Route: Take 4 capsules by mouth As Needed (potassium replacement.  see admin instructions). - Oral    Linked Group 2:  \"Or\" Linked Group Details        potassium chloride 10 mEq in 100 mL IVPB 10 mEq Every 1 Hour PRN 7/29/2017     Sig - Route: Infuse 100 mL into a venous catheter Every " "1 (One) Hour As Needed (potassium protocol PERIPHERAL - see admin instructions). - Intravenous    Linked Group 2:  \"Or\" Linked Group Details        potassium chloride 10 mEq in 100 mL IVPB 10 mEq Every 1 Hour 7/31/2017 7/31/2017    Sig - Route: Infuse 100 mL into a venous catheter Every 1 (One) Hour. - Intravenous    potassium phosphate 15 mmol in sodium chloride 0.9 % 100 mL infusion 15 mmol As Needed 7/29/2017     Sig - Route: Infuse 15 mmol into a venous catheter As Needed (Peripheral IV - Phosphorus 1.25 - 2.1). - Intravenous    Linked Group 3:  \"Or\" Linked Group Details        potassium phosphate 30 mmol in sodium chloride 0.9 % 250 mL infusion 30 mmol As Needed 7/29/2017     Sig - Route: Infuse 30 mmol into a venous catheter As Needed (Peripheral IV - Phosphorus 0.9 - 1.24). - Intravenous    Linked Group 3:  \"Or\" Linked Group Details        potassium phosphate 45 mmol in sodium chloride 0.9 % 500 mL infusion 45 mmol As Needed 7/29/2017     Sig - Route: Infuse 45 mmol into a venous catheter As Needed (Peripheral IV - Phosphorus Less Than 0.9). - Intravenous    Linked Group 3:  \"Or\" Linked Group Details        promethazine (PHENERGAN) 12.5 mg in sodium chloride 0.9 % 50 mL 12.5 mg Every 6 Hours PRN 7/29/2017     Sig - Route: Infuse 12.5 mg into a venous catheter Every 6 (Six) Hours As Needed for Nausea or Vomiting. - Intravenous    Linked Group 4:  \"Or\" Linked Group Details        promethazine (PHENERGAN) tablet 25 mg 25 mg Every 6 Hours PRN 7/29/2017     Sig - Route: Take 1 tablet by mouth Every 6 (Six) Hours As Needed for Nausea or Vomiting. - Oral    Linked Group 4:  \"Or\" Linked Group Details        sodium chloride 0.9 % flush 1-10 mL 1-10 mL As Needed 7/29/2017     Sig - Route: Infuse 1-10 mL into a venous catheter As Needed for Line Care. - Intravenous    sodium chloride 0.9 % flush 10 mL 10 mL As Needed 7/28/2017     Sig - Route: Infuse 10 mL into a venous catheter As Needed for Line Care. - Intravenous    " "Cosign for Ordering: Accepted by Gema Matta MD on 7/29/2017 12:09 AM    Linked Group 5:  \"And\" Linked Group Details        sodium chloride 0.9 % infusion 75 mL/hr Continuous 7/29/2017     Sig - Route: Infuse 75 mL/hr into a venous catheter Continuous. - Intravenous    sodium hypochlorite (DAKIN'S) topical solution 0.25% (half strength)  2 Times Daily 7/29/2017     Sig - Route: Irrigate with  as directed 2 (Two) Times a Day. - Irrigation    Cosign for Ordering: Accepted by Sung Blair MD on 7/30/2017  9:37 AM    sodium phosphates 15 mmol in sodium chloride 0.9 % 250 mL IVPB 15 mmol As Needed 7/29/2017     Sig - Route: Infuse 15 mmol into a venous catheter As Needed (Peripheral IV - Phosphorus 1.25 - 2.1 & Potassium Greater Than 4). - Intravenous    Linked Group 3:  \"Or\" Linked Group Details        sodium phosphates 30 mmol in sodium chloride 0.9 % 250 mL IVPB 30 mmol As Needed 7/29/2017     Sig - Route: Infuse 30 mmol into a venous catheter As Needed (Peripheral IV - Phosphorus 0.9 - 1.24 & Potassium Greater Than 4). - Intravenous    Linked Group 3:  \"Or\" Linked Group Details        sodium phosphates 45 mmol in sodium chloride 0.9 % 250 mL IVPB 45 mmol As Needed 7/29/2017     Sig - Route: Infuse 45 mmol into a venous catheter As Needed (Peripheral IV - Phosphorus Less Than 0.9 & Potassium Greater Than 4). - Intravenous    Linked Group 3:  \"Or\" Linked Group Details        sucralfate (CARAFATE) tablet 1 g 1 g 4 Times Daily 7/29/2017     Sig - Route: Take 1 tablet by mouth 4 (Four) Times a Day. - Oral    topiramate (TOPAMAX) tablet 100 mg 100 mg 3 Times Daily 7/29/2017     Sig - Route: Take 1 tablet by mouth 3 (Three) Times a Day. - Oral    traZODone (DESYREL) tablet 50 mg 50 mg Nightly PRN 7/29/2017     Sig - Route: Take 1 tablet by mouth At Night As Needed for Sleep. - Oral    vancomycin (VANCOCIN) 2,000 mg in sodium chloride 0.9 % 500 mL IVPB 2,000 mg Every 12 Hours 7/29/2017     Sig - Route: " Infuse 2,000 mg into a venous catheter Every 12 (Twelve) Hours. - Intravenous    vitamin C (ASCORBIC ACID) tablet 250 mg 250 mg 2 Times Daily 7/29/2017     Sig - Route: Take 0.5 tablets by mouth 2 (Two) Times a Day. - Oral    vitamin D3 capsule 5,000 Units 5,000 Units Daily 7/29/2017     Sig - Route: Take 1 capsule by mouth Daily. - Oral    meropenem (MERREM) 1 g/100 mL 0.9% NS VTB (mbp) (Discontinued) 1 g Every 8 Hours 7/29/2017 7/31/2017    Sig - Route: Infuse 100 mL into a venous catheter Every 8 (Eight) Hours. - Intravenous            Cultures:    Blood Culture   Date Value Ref Range Status   07/29/2017 No growth at 24 hours  Preliminary   07/29/2017 No growth at 24 hours  Preliminary   07/28/2017 Gram positive cocci, consistent with Staph spp (A)  Preliminary   07/28/2017 No growth at 2 days  Preliminary     Urine Culture   Date Value Ref Range Status   07/29/2017 >100,000 CFU/mL Escherichia coli (A)  Final     Wound Culture   Date Value Ref Range Status   07/29/2017 Culture in progress  Preliminary       PROBLEM LIST:    Patient Active Problem List   Diagnosis   • Infected decubitus ulcer   • Decubitus skin ulcer   • Sepsis       Assessment/Plan     ASSESSMENT:    1.  Severe sepsis  2.  Acute osteomyelitis  3.  UTI     PLAN:    Based on MRI report showing fairly large effusion in the joint space of the right hip orthopedic follow-up is appreciated and consult on 7/31/2017 states agreement with IV antibiotic therapy and I&D of gluteal wound.    UC finalized from 7/29/2017 as E Coli with wound culture and blood cultures still in progress. The patient seems to have multiple infectious disease issues including wound infection, bacteremia, UTI and for now coverage was changed from vancomycin and merrem to vancomycin and levaquin 500 mg IV q24 hours and pharmacy asked to check on drug to drug interactions or prolonged QTC.    Patient presents with fairly complicated presentation along with persistent severe sepsis,  encephalopathy and an infection that presents a life and limb threatening condition.      Patients findings and recommendations were discussed with patient and nursing staff    Code Status: Full Code       Susan Royal PA-C  08/01/17  10:45 AM    Physician Attestation:    The documentation recorded by the scribe accurately reflects the service I personally performed and the decisions made by me.    Tra Jain MD  Infectious Diseases  08/01/17  10:45 AM

## 2017-08-01 NOTE — PLAN OF CARE
Problem: Patient Care Overview (Adult)  Goal: Plan of Care Review  Outcome: Ongoing (interventions implemented as appropriate)    07/31/17 0245 08/01/17 0735   Coping/Psychosocial Response Interventions   Plan Of Care Reviewed With --  patient   Patient Care Overview   Progress progress toward functional goals as expected --        Goal: Adult Individualization and Mutuality  Outcome: Ongoing (interventions implemented as appropriate)  Goal: Discharge Needs Assessment  Outcome: Ongoing (interventions implemented as appropriate)    07/29/17 1002 07/29/17 1004 07/29/17 2137   Discharge Needs Assessment   Readmission Within The Last 30 Days --  --  --    Equipment Needed After Discharge --  --  --    Discharge Facility/Level Of Care Needs --  home with home health  (Pt utilizes [facility]. Pt utilizes skilled nursing and physical therapy, MWF. HH to be contacted at discharge. ) --    Discharge Disposition --  --  still a patient   Current Health   Anticipated Changes Related to Illness --  --  --    Living Environment   Transportation Available car;family or friend will provide --  --    Self-Care   Equipment Currently Used at Home wheelchair, motorized;wheelchair;wound care supplies;hospital bed  (DME provider unknown. ) --  --      08/01/17 0050   Discharge Needs Assessment   Readmission Within The Last 30 Days no previous admission in last 30 days   Equipment Needed After Discharge none   Discharge Facility/Level Of Care Needs --    Discharge Disposition --    Current Health   Anticipated Changes Related to Illness none   Living Environment   Transportation Available --    Self-Care   Equipment Currently Used at Home --          Problem: Skin Integrity Impairment, Risk/Actual (Adult)  Intervention: Promote/Optimize Nutrition    08/01/17 1519   Hygiene Care Assistance   Oral Care oral care provided       Intervention: Prevent/Manage Excess Moisture    08/01/17 0735 08/01/17 1519   Hygiene Care Assistance    Perineal Care --  cleansed;absorbent pad changed;perianal area cleansed   Hygiene Care   Bathing/Skin Care --  bath, complete;incontinence care;linen changed;other (see comments)  (Used Surgical Scrub.)   Skin Interventions   Skin Protection adhesive use limited;electrode sites changed;protective footwear used;pulse oximeter probe site changed;tubing/devices free from skin contact --        Intervention: Prevent/Minimize Sheer/Friction Injuries    08/01/17 0735 08/01/17 1500   Positioning   Positioning/Transfer Devices --  pillows;in use   Skin Interventions   Pressure Reduction Techniques frequent weight shift encouraged;heels elevated off bed;positioned off wounds;weight shift assistance provided --    Pressure Reduction Devices pressure-redistributing mattress utilized --          Goal: Identify Related Risk Factors and Signs and Symptoms  Outcome: Ongoing (interventions implemented as appropriate)    07/31/17 0245   Skin Integrity Impairment, Risk/Actual   Skin Integrity Impairment, Risk/Actual: Related Risk Factors immobility;sensory impairment   Signs and Symptoms (Skin Integrity Impairment) edema       Goal: Skin Integrity/Wound Healing  Outcome: Ongoing (interventions implemented as appropriate)    08/01/17 0050   Skin Integrity Impairment, Risk/Actual (Adult)   Skin Integrity/Wound Healing making progress toward outcome         Problem: Pressure Ulcer (Adult)  Intervention: Promote/Optimize Nutrition    08/01/17 1519   Hygiene Care Assistance   Oral Care oral care provided       Intervention: Prevent/Manage Excess Moisture    08/01/17 0735 08/01/17 1519   Hygiene Care Assistance   Perineal Care --  cleansed;absorbent pad changed;perianal area cleansed   Hygiene Care   Bathing/Skin Care --  bath, complete;incontinence care;linen changed;other (see comments)  (Used Surgical Scrub.)   Skin Interventions   Skin Protection adhesive use limited;electrode sites changed;protective footwear used;pulse oximeter  probe site changed;tubing/devices free from skin contact --        Intervention: Position/Reposition to Promote Ulcer Healing    08/01/17 0735 08/01/17 1500   Positioning   Positioning --  side lying, left   Head Of Bed (HOB) Position --  HOB elevated   Skin Interventions   Pressure Reduction Devices pressure-redistributing mattress utilized --        Intervention: Prevent/Minimize Sheer/Friction Injuries    08/01/17 0735 08/01/17 1500   Positioning   Positioning/Transfer Devices --  pillows;in use   Skin Interventions   Pressure Reduction Techniques frequent weight shift encouraged;heels elevated off bed;positioned off wounds;weight shift assistance provided --    Pressure Reduction Devices pressure-redistributing mattress utilized --          Goal: Signs and Symptoms of Listed Potential Problems Will be Absent or Manageable (Pressure Ulcer)  Outcome: Ongoing (interventions implemented as appropriate)    08/01/17 0050   Pressure Ulcer   Problems Assessed (Pressure Ulcer) all   Problems Present (Pressure Ulcer) pain;wound complications;infection;wound progression/extension         Problem: Pressure Ulcer Risk (Alejandro Scale) (Adult,Obstetrics,Pediatric)  Goal: Identify Related Risk Factors and Signs and Symptoms  Outcome: Ongoing (interventions implemented as appropriate)    07/31/17 0245   Pressure Ulcer Risk (Alejandro Scale)   Related Risk Factors (Pressure Ulcer Risk (Alejandro Scale)) body weight extremes;mobility impaired       Goal: Skin Integrity  Outcome: Ongoing (interventions implemented as appropriate)    08/01/17 0050   Pressure Ulcer Risk (Alejandro Scale) (Adult,Obstetrics,Pediatric)   Skin Integrity making progress toward outcome

## 2017-08-01 NOTE — PLAN OF CARE
Problem: Patient Care Overview (Adult)  Goal: Plan of Care Review  Outcome: Ongoing (interventions implemented as appropriate)  Goal: Discharge Needs Assessment  Outcome: Ongoing (interventions implemented as appropriate)    Problem: Skin Integrity Impairment, Risk/Actual (Adult)  Goal: Identify Related Risk Factors and Signs and Symptoms  Outcome: Ongoing (interventions implemented as appropriate)  Goal: Skin Integrity/Wound Healing  Outcome: Ongoing (interventions implemented as appropriate)    Problem: Pressure Ulcer (Adult)  Goal: Signs and Symptoms of Listed Potential Problems Will be Absent or Manageable (Pressure Ulcer)  Outcome: Ongoing (interventions implemented as appropriate)    Problem: Pressure Ulcer Risk (Alejandro Scale) (Adult,Obstetrics,Pediatric)  Goal: Identify Related Risk Factors and Signs and Symptoms  Outcome: Ongoing (interventions implemented as appropriate)  Goal: Skin Integrity  Outcome: Ongoing (interventions implemented as appropriate)
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
## 2017-08-01 NOTE — PROGRESS NOTES
Discharge Planning Assessment   Fabricio     Patient Name: Ken Krueger  MRN: [id number]  Today's Date: 8/1/2017    Admit Date: 7/28/2017       Discharge Plan       08/01/17 1434    Case Management/Social Work Plan    Plan [facility] consult ordered. SS contacted [facility] per Laurence to make aware and faxed information for review. SS to follow.         Discharge Placement     Facility/Agency Request Status Selected? Address Phone Number Fax Number    [facility] [place] Pending - No Request Sent     [address] [facility] [place] [id number] [phone]          Alycia Reed

## 2017-08-01 NOTE — DISCHARGE PLACEMENT REQUEST
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      Creatinine 0.63 mg/dL      Sodium 137 mmol/L      Potassium 3.3 (L) mmol/L      Chloride 108 mmol/L      CO2 21.6 (L) mmol/L      Calcium 9.1 mg/dL      Total Protein 7.1 g/dL      Albumin 3.20 (L) g/dL      ALT (SGPT) 49 (H) U/L      AST (SGOT) 37 (H) U/L      Alkaline Phosphatase 173 (H) U/L       Note New Reference Ranges        Total Bilirubin 0.5 mg/dL      eGFR Non African Amer 142 mL/min/1.73      Globulin 3.9 gm/dL      A/G Ratio 0.8 (L) g/dL      BUN/Creatinine Ratio 15.9     Anion Gap 7.4 mmol/L     Magnesium [[id number]]  (Normal) Collected:  08/01/17 0058    Specimen:  Blood Updated:  08/01/17 0213     Magnesium 1.7 mg/dL     Phosphorus [[id number]]  (Normal) Collected:  08/01/17 0058    Specimen:  Blood Updated:  08/01/17 0213     Phosphorus 2.8 mg/dL     Osmolality, Calculated [[id number]]  (Normal) Collected:  08/01/17 0058    Specimen:  Blood Updated:  08/01/17 0213     Osmolality Calc 274.7 mOsm/kg     CBC & Differential [[id number]] Collected:  08/01/17 0058    Specimen:  Blood Updated:  08/01/17 0214    Narrative:       The following orders were created for panel order CBC & Differential.  Procedure                               Abnormality         Status                      ---------                               -----------         ------                     Scan Slide[[id number]]                                                                  CBC Auto Differential[[id number]]        Abnormal            Final result                 Please view results for these tests on the individual orders.    CBC Auto Differential [[id number]]  (Abnormal) Collected:  08/01/17 0058    Specimen:  Blood Updated:  08/01/17 0214     WBC 13.54 (H) 10*3/mm3      RBC 3.38 (L) 10*6/mm3      Hemoglobin 9.1 (L) g/dL      Hematocrit 29.5 (L) %      MCV 87.3 fL      MCH 26.9 (L) pg      MCHC 30.8 (L) g/dL      RDW 14.0 %      RDW-SD 44.7 fl      MPV 10.0 fL      Platelets 499 (H) 10*3/mm3      Neutrophil % 70.8 (H) %      Lymphocyte % 15.4 (L) %      Monocyte % 4.4 %      Eosinophil % 7.0 (H) %      Basophil % 0.4 %      Immature Grans % 2.0 (H) %      Neutrophils, Absolute 9.58 (H) 10*3/mm3      Lymphocytes, Absolute 2.09 10*3/mm3      Monocytes, Absolute 0.60 10*3/mm3      Eosinophils, Absolute 0.95 (H) 10*3/mm3      Basophils, Absolute 0.05 10*3/mm3      Immature Grans, Absolute 0.27 (H) 10*3/mm3      nRBC 0.0 /100 WBC     Wound Culture [[id number]]  (Abnormal)  (Susceptibility) Collected:  07/29/17 1047    Specimen:  Wound from Buttock, Right Updated:  08/01/17 1028     Wound Culture Scant growth (1+) Enterococcus faecalis (A)      Scant growth (1+) Streptococcus, Beta Hemolytic, Group B (A)     STREP GROUPING B     Wound Culture Scant growth (1+) Gram positive cocci, consistent with Staph spp (A)     Gram Stain Result Few (2+) WBCs seen      Occasional Gram positive cocci in pairs      Extracellular       Susceptibility      Enterococcus faecalis     ROGER (Preliminary)     Ampicillin <=2 ug/ml Susceptible     Erythromycin <=0.5 ug/ml Susceptible     Levofloxacin <=1 ug/ml Susceptible     Penicillin G 2 ug/ml Susceptible     Rifampin 2 ug/ml Intermediate     Vancomycin 4 ug/ml Susceptible                         Imaging Results (last 24 hours)     Procedure Component Value Units Date/Time    MRI Pelvis Without Contrast [[id number]] Collected:  07/31/17 1426     Updated:  07/31/17 1500    Narrative:       MRI PELVIS WITHOUT CONTRAST-     REASON FOR EXAM:  Osteomyelitis ischial tuberosity, rule out  femoroacetabular involvement; A41.9-Sepsis, unspecified organism;  M86.18-Other acute osteomyelitis, other site.     Scans were obtained through the pelvis in the axial, coronal and  sagittal planes using T1 and T2 sequences. Fat suppression was utilized.  There is some motion artifact on the exam.     MRI FINDINGS: There is abnormal signal noted in the femoral head and  neck area on the right. This would be consistent with avascular  necrosis. There is evidence of an old fracture involving the femoral  neck. There is some superior subluxation of the intratrochanteric  portion of the femur. There is fluid in the joint space around the hip.  There is abnormal signal intensity noted in the marrow of the femoral  head and in the intertrochanteric portion of the proximal femur. There  is some edema in the muscle planes around the hip as well.       Impression:       Abnormal MRI of the pelvis. There is evidence of an old  fracture involving the femoral neck. There is a fairly large effusion in  the joint space of the hip. There is abnormal marrow signal in the  femoral head and to a lesser degree in the intertrochanteric portion of  the proximal femur. There is also evidence of edema in the muscle planes  around the hip. This would be consistent with osteomyelitis.     This report was finalized on 7/31/2017 2:58 PM by Dr. Robinson Hardy II, MD.           Orders (last 24 hrs)     Start     Ordered    08/02/17 0600  CBC & Differential  Morning Draw      08/01/17 1050    08/02/17 0600  Comprehensive Metabolic Panel  Morning Draw      08/01/17 1050    08/02/17 0600  C-reactive Protein  Morning Draw      08/01/17 1050    08/02/17 0600   Magnesium  Morning Draw      08/01/17 1050    08/02/17 0600  Phosphorus  Morning Draw      08/01/17 1050    08/01/17 1427  US Guided Fine Needle Aspiration  1 Time Imaging     Comments:  Please send aspirate for cell count and diff, culture aerobic, anaerobic and fungus    08/01/17 1223    08/01/17 1421  ECG 12 Lead  Once      08/01/17 1420    08/01/17 1258  Diet Regular; Consistent Carbohydrate  Diet Effective Now     Comments:  Bedtime snack (deliver at 7 pm) fresh fruit and cheese cubes - 2 ounces cheese and 1 cup fresh fruit    08/01/17 1257    08/01/17 1222  Consult to Interventional Radiology  1 Time Imaging,   Status:  Canceled     Comments:  Please send aspirate for cell count and diff, culture aerobic, anaerobic and fungus    08/01/17 1223    08/01/17 1056  Inpatient Consult to [facility] Hospital Referral  Once     Provider:  (Not yet assigned)    08/01/17 1055    08/01/17 0800  potassium chloride (K-DUR,KLOR-CON) CR tablet 40 mEq  Every 4 Hours      08/01/17 0223    08/01/17 0600  CBC & Differential  Morning Draw      07/31/17 1730    08/01/17 0600  Comprehensive Metabolic Panel  Morning Draw      07/31/17 1730    08/01/17 0600  C-reactive Protein  Morning Draw      07/31/17 1730    08/01/17 0600  Magnesium  Morning Draw      07/31/17 1730    08/01/17 0600  Phosphorus  Morning Draw      07/31/17 1730    08/01/17 0600  CBC Auto Differential  PROCEDURE ONCE      08/01/17 0001    08/01/17 0400  magnesium sulfate 4 gram infusion- Mg 1.6-1.9 mg/dL  Once      08/01/17 0221    08/01/17 0214  Osmolality, Calculated  Once      08/01/17 0213    08/01/17 0144  Scan Slide  Once,   Status:  Canceled      08/01/17 0143    08/01/17 0100  Potassium  Timed      07/31/17 2345    07/31/17 2100  sodium hypochlorite (DAKIN'S) topical solution 0.25% (half strength)  Every 12 Hours      07/31/17 1607    07/31/17 1726  sodium hypochlorite (DAKIN'S) topical solution 0.25% (half strength)  Every 12 Hours,   Status:   Discontinued      07/31/17 1604    07/31/17 1700  insulin aspart (novoLOG) injection 0-7 Units  As Needed      07/31/17 1648    07/31/17 1647  POC Glucose Fingerstick  As Needed,   Status:  Canceled      07/31/17 1646    07/31/17 1646  POC Glucose Fingerstick  As Needed,   Status:  Canceled      07/31/17 1646    07/31/17 1459  Diet Regular; Consistent Carbohydrate  Diet Effective Now,   Status:  Canceled      07/31/17 1459    07/31/17 1457  Inpatient Consult to Orthopedic Surgery  Once     Specialty:  Orthopedic Surgery  Provider:  Bryce Mason MD    07/31/17 1458    07/31/17 1200  potassium chloride 10 mEq in 100 mL IVPB  Every 1 Hour      07/31/17 0639    07/31/17 0900  levoFLOXacin (LEVAQUIN) 500 mg/100 mL D5W (premix) (LEVAQUIN) 500 mg  Every 24 Hours      07/31/17 0748    07/30/17 1400  heparin (porcine) 5000 UNIT/ML injection 5,000 Units  Every 8 Hours Scheduled      07/30/17 0830    07/30/17 0900  multivitamin (DAILY SARAH) tablet 1 tablet  Daily      07/30/17 0803    07/30/17 0600  POC Glucose Fingerstick  Daily,   Status:  Canceled      07/29/17 0902    07/30/17 0100  nystatin (MYCOSTATIN) powder  Every 12 Hours Scheduled      07/30/17 0008    07/29/17 2100  traZODone (DESYREL) tablet 50 mg  Nightly PRN      07/29/17 0930    07/29/17 1800  Dietary Nutrition Supplements Other (See Comment)  2 Times Daily,   Status:  Canceled     Comments:  Ensure pudding l,d.    07/29/17 1105    07/29/17 1400  gabapentin (NEURONTIN) capsule 400 mg  Every 8 Hours Scheduled      07/29/17 0947    07/29/17 1300  acetaminophen (TYLENOL) tablet 650 mg  Every 6 Hours PRN      07/29/17 1219    07/29/17 1219  acetaminophen (TYLENOL) tablet 650 mg  Every 6 Hours PRN      07/29/17 1219    07/29/17 1200  sucralfate (CARAFATE) tablet 1 g  4 Times Daily      07/29/17 0856    07/29/17 1200  vancomycin (VANCOCIN) 2,000 mg in sodium chloride 0.9 % 500 mL IVPB  Every 12 Hours      07/29/17 0748    07/29/17 1200  sodium  hypochlorite (DAKIN'S) topical solution 0.25% (half strength)  2 Times Daily,   Status:  Discontinued      07/29/17 1058    07/29/17 1200  castor oil-balsam peru (VENELEX) ointment  Every 12 Hours Scheduled      07/29/17 1059    07/29/17 1130  insulin aspart (novoLOG) injection 0-7 Units  4 Times Daily Before Meals & Nightly,   Status:  Discontinued      07/29/17 0902    07/29/17 1100  vitamin D3 capsule 5,000 Units  Daily      07/29/17 0856    07/29/17 1100  DULoxetine (CYMBALTA) DR capsule 60 mg  2 Times Daily      07/29/17 0856    07/29/17 1100  modafinil (PROVIGIL) tablet 200 mg  Daily      07/29/17 0856    07/29/17 1100  topiramate (TOPAMAX) tablet 100 mg  3 Times Daily      07/29/17 0856    07/29/17 1100  vitamin C (ASCORBIC ACID) tablet 250 mg  2 Times Daily      07/29/17 0856    07/29/17 1100  methenamine (HIPREX) tablet 1 g  Every 12 Hours Scheduled      07/29/17 0856    07/29/17 1100  pantoprazole (PROTONIX) EC tablet 40 mg  Every Early Morning      07/29/17 0856    07/29/17 1100  POC Glucose Fingerstick  4 Times Daily Before Meals & at Bedtime,   Status:  Canceled      07/29/17 0902    07/29/17 1000  baclofen (LIORESAL) tablet 30 mg  Every 8 Hours PRN      07/29/17 0929    07/29/17 0902  dextrose (GLUTOSE) oral gel 15 g  Every 15 Minutes PRN      07/29/17 0902    07/29/17 0902  dextrose (D50W) solution 25 g  Every 15 Minutes PRN      07/29/17 0902    07/29/17 0902  glucagon (human recombinant) (GLUCAGEN DIAGNOSTIC) injection 1 mg  Every 15 Minutes PRN      07/29/17 0902    07/29/17 0855  ibuprofen (ADVIL,MOTRIN) tablet 600 mg  3 Times Daily PRN      07/29/17 0856    07/29/17 0855  menthol-zinc oxide (CALMOSEPTINE) 0.44-20.625 % ointment  3 Times Daily PRN      07/29/17 0856    07/29/17 0855  promethazine (PHENERGAN) tablet 25 mg  Every 6 Hours PRN      07/29/17 0855    07/29/17 0855  promethazine (PHENERGAN) 12.5 mg in sodium chloride 0.9 % 50 mL  Every 6 Hours PRN      07/29/17 0855    07/29/17 0853   potassium phosphate 45 mmol in sodium chloride 0.9 % 500 mL infusion  As Needed      07/29/17 0853    07/29/17 0853  potassium phosphate 30 mmol in sodium chloride 0.9 % 250 mL infusion  As Needed      07/29/17 0853    07/29/17 0853  potassium phosphate 15 mmol in sodium chloride 0.9 % 100 mL infusion  As Needed      07/29/17 0853    07/29/17 0853  sodium phosphates 45 mmol in sodium chloride 0.9 % 250 mL IVPB  As Needed      07/29/17 0853    07/29/17 0853  sodium phosphates 30 mmol in sodium chloride 0.9 % 250 mL IVPB  As Needed      07/29/17 0853    07/29/17 0853  sodium phosphates 15 mmol in sodium chloride 0.9 % 250 mL IVPB  As Needed      07/29/17 0853    07/29/17 0853  Magnesium Sulfate 2 gram Bolus, followed by 8 gram infusion (total Mg dose 10 grams)- Mg less than or equal to 1mg/dL  As Needed      07/29/17 0853    07/29/17 0853  Magnesium Sulfate 6 gram Infusion (2 gm x 3) -Mg 1.1 -1.5 mg/dL  As Needed      07/29/17 0853    07/29/17 0853  magnesium sulfate 4 gram infusion- Mg 1.6-1.9 mg/dL  As Needed      07/29/17 0853    07/29/17 0852  potassium chloride (MICRO-K) CR capsule 40 mEq  As Needed      07/29/17 0853    07/29/17 0852  potassium chloride (KLOR-CON) packet 40 mEq  As Needed      07/29/17 0853    07/29/17 0852  potassium chloride 10 mEq in 100 mL IVPB  Every 1 Hour PRN      07/29/17 0853    07/29/17 0745  sodium chloride 0.9 % infusion  Continuous      07/29/17 0708    07/29/17 0708  sodium chloride 0.9 % flush 1-10 mL  As Needed      07/29/17 0708    07/29/17 0536  dextrose (GLUTOSE) oral gel 15 g  Every 15 Minutes PRN      07/29/17 0536    07/29/17 0536  dextrose (D50W) solution 25 g  Every 15 Minutes PRN      07/29/17 0536    07/29/17 0536  glucagon (human recombinant) (GLUCAGEN DIAGNOSTIC) injection 1 mg  Every 15 Minutes PRN      07/29/17 0536    07/28/17 2257  sodium chloride 0.9 % flush 10 mL  As Needed      07/28/17 2258    Unscheduled  POC Glucose Fingerstick  As Needed      07/31/17 1203     --  gabapentin (NEURONTIN) 400 MG capsule  2 Times Daily      07/29/17 0537    --  SCANNED - TELEMETRY        07/28/17 0000          Operative/Procedure Notes (most recent note)     No notes of this type exist for this encounter.           Physician Progress Notes (most recent note)      Bryce Mason MD at 8/1/2017 12:24 PM  Version 1 of 1         Have reviewed MRI of right hip and pelvis.  Does show evidence of old femoral neck fracture questionable avascular necrosis.  Joint effusion is noted.  Have discussed with radiologist that this may or may not be consistent with osteomyelitis versus a chronic femoral neck nonunion.  We'll plan to have the joint aspirated today by interventional radiology.  If evidence of infection would proceed with surgical intervention tomorrow essentially a Girdlestone procedure of the right hip.  If aspiration is sterile would continue to treat with local wound care and IV antibiotics.     Electronically signed by Bryce Mason MD at 8/1/2017 12:27 PM           Consult Notes (most recent note)      KAYLAH Javed at 7/31/2017  9:14 AM  Version 1 of 1     Consult Orders:    1. Inpatient Consult to Orthopedic Surgery [[id number]] ordered by Javier Moscoso DO at 07/30/17 1022                New Consult      Patient: Ken Krueger  YOB: 1975  Date of Encounter: 07/31/17            History of Present Illness:     41-year-old pleasant male seen today in consultation secondary to CT results with evidence of osteomyelitis ischial tuberosity. Apparently the patient presented with a subjective fever and mental status changes over the last several days. The family stated that he has had different types of infections in the past and has presented in similar fashion. Then heavily diaphoretic. Urine sample taken from urostomy bag revealed evidence of active bacteria growth. Patient continued to deteriorate and was brought to the emergency department  via his family for further workup and management. Patient has a 3 month history of a complicated right gluteal ulcer which was debrided by general surgery. He has had wound VAC in place with slow healing per the family. Patient was initiated on broad-spectrum metabolic coverage at time of admission Patient has notable posterior hip and gluteal pain. He denies specific hip pain proper. Surgical history consistent with a left above-the-knee amputation         Patient Active Problem List   Diagnosis   • Infected decubitus ulcer   • Decubitus skin ulcer   • Sepsis     Past Medical History:   Diagnosis Date   • Asthma    • Cancer     skin cancer on right arm   • Diabetes mellitus    • History of transfusion    • Hyperlipidemia    • Hypertension    • Paraplegia    • Sleep apnea      Past Surgical History:   Procedure Laterality Date   • ABOVE KNEE AMPUTATION Left    • BACK SURGERY     • CHOLECYSTECTOMY     • COLON SURGERY     • COLOSTOMY     • SKIN BIOPSY     • TRUNK DEBRIDEMENT Right 4/26/2017    Procedure: DEBRIDEMENT ISHEAL ULCER/BUTTOCKS WOUND RT.HIP;  Surgeon: Scooter Moran MD;  Location: [facility];  Service:      Social History     Occupational History   • Not on file.     Social History Main Topics   • Smoking status: Never Smoker   • Smokeless tobacco: Not on file   • Alcohol use Yes      Comment: occassional    • Drug use: Yes     Special: Marijuana   • Sexual activity: Defer    Social History     Social History Narrative     History reviewed. No pertinent family history.  Current Facility-Administered Medications   Medication Dose Route Frequency Provider Last Rate Last Dose   • acetaminophen (TYLENOL) tablet 650 mg  650 mg Oral Q6H PRN Christopher A Danis, DO       • acetaminophen (TYLENOL) tablet 650 mg  650 mg Oral Q6H PRN Christopher MARTIN Danis, DO   650 mg at 07/29/17 2213   • baclofen (LIORESAL) tablet 30 mg  30 mg Oral Q8H PRN Christopher MARTIN Danis, DO   30 mg at 07/29/17 2121   • castor oil-balsam  peru (VENELEX) ointment   Topical Q12H Carlin Landers MD       • dextrose (D50W) solution 25 g  25 g Intravenous Q15 Min PRN Carlin Landers MD       • dextrose (D50W) solution 25 g  25 g Intravenous Q15 Min PRN Javier Moscoso, DO       • dextrose (GLUTOSE) oral gel 15 g  15 g Oral Q15 Min PRN Carlin Landers MD       • dextrose (GLUTOSE) oral gel 15 g  15 g Oral Q15 Min PRN Javier Moscoso, DO       • DULoxetine (CYMBALTA) DR capsule 60 mg  60 mg Oral BID Javier Moscoso, DO   60 mg at 07/31/17 0834   • gabapentin (NEURONTIN) capsule 400 mg  400 mg Oral Q8H Javier Nolanell, DO   400 mg at 07/31/17 0525   • glucagon (human recombinant) (GLUCAGEN DIAGNOSTIC) injection 1 mg  1 mg Subcutaneous Q15 Min PRN Carlin Landers MD       • glucagon (human recombinant) (GLUCAGEN DIAGNOSTIC) injection 1 mg  1 mg Subcutaneous Q15 Min PRN Javier Moscoso, DO       • heparin (porcine) 5000 UNIT/ML injection 5,000 Units  5,000 Units Subcutaneous Q8H Javier Nolanell, DO   5,000 Units at 07/31/17 0550   • ibuprofen (ADVIL,MOTRIN) tablet 600 mg  600 mg Oral TID PRN Javier Moscoso, DO       • insulin aspart (novoLOG) injection 0-7 Units  0-7 Units Subcutaneous 4x Daily AC & at Bedtime Javier Moscoso, DO   Stopped at 07/30/17 2307   • levoFLOXacin (LEVAQUIN) 500 mg/100 mL D5W (premix) (LEVAQUIN) 500 mg  500 mg Intravenous Q24H Tra Jain MD   500 mg at 07/31/17 0835   • Magnesium Sulfate 2 gram Bolus, followed by 8 gram infusion (total Mg dose 10 grams)- Mg less than or equal to 1mg/dL  2 g Intravenous PRN Javier Moscoso, DO        Or   • Magnesium Sulfate 6 gram Infusion (2 gm x 3) -Mg 1.1 -1.5 mg/dL  2 g Intravenous PRN Javier Moscoso, DO        Or   • magnesium sulfate 4 gram infusion- Mg 1.6-1.9 mg/dL  4 g Intravenous PRN Javier Moscoso, DO 25 mL/hr at 07/30/17 0241 4 g at 07/30/17 0241   • magnesium sulfate 4 gram infusion- Mg  1.6-1.9 mg/dL  4 g Intravenous Once Carlin Landers MD 25 mL/hr at 07/31/17 0833 4 g at 07/31/17 0833   • menthol-zinc oxide (CALMOSEPTINE) 0.44-20.625 % ointment   Topical TID PRN Javier Steenxell, DO       • methenamine (HIPREX) tablet 1 g  1 g Oral Q12H Rizwaner MARTIN SteenDanis, DO   1 g at 07/31/17 0834   • modafinil (PROVIGIL) tablet 200 mg  200 mg Oral Daily Rizwaner MARTIN Danis, DO   200 mg at 07/31/17 0834   • multivitamin (DAILY SARAH) tablet 1 tablet  1 tablet Oral Daily Rizwaner MARTIN Danis, DO   1 tablet at 07/31/17 0834   • nystatin (MYCOSTATIN) powder   Topical Q12H Carlin Landers MD       • pantoprazole (PROTONIX) EC tablet 40 mg  40 mg Oral Q AM Javier Steenxell, DO   40 mg at 07/31/17 0525   • potassium chloride (MICRO-K) CR capsule 40 mEq  40 mEq Oral PRN Javier Steenxell, DO        Or   • potassium chloride (KLOR-CON) packet 40 mEq  40 mEq Oral PRN Javier Steenxell, DO        Or   • potassium chloride 10 mEq in 100 mL IVPB  10 mEq Intravenous Q1H PRN Javier Steenxell, DO       • potassium chloride 10 mEq in 100 mL IVPB  10 mEq Intravenous Q1H Carlin Landers MD       • potassium phosphate 45 mmol in sodium chloride 0.9 % 500 mL infusion  45 mmol Intravenous PRN Rizwaner A Danis, DO        Or   • potassium phosphate 30 mmol in sodium chloride 0.9 % 250 mL infusion  30 mmol Intravenous PRN Rizwaner A Danis, DO        Or   • potassium phosphate 15 mmol in sodium chloride 0.9 % 100 mL infusion  15 mmol Intravenous PRN Rizwaner A Danis, DO        Or   • sodium phosphates 45 mmol in sodium chloride 0.9 % 250 mL IVPB  45 mmol Intravenous PRN Rizwaner A Danis, DO        Or   • sodium phosphates 30 mmol in sodium chloride 0.9 % 250 mL IVPB  30 mmol Intravenous PRN Rizwaner A Danis, DO        Or   • sodium phosphates 15 mmol in sodium chloride 0.9 % 250 mL IVPB  15 mmol Intravenous PRN Javier Moscoso,        • promethazine  "(PHENERGAN) tablet 25 mg  25 mg Oral Q6H PRN Christopher A Danis, DO        Or   • promethazine (PHENERGAN) 12.5 mg in sodium chloride 0.9 % 50 mL  12.5 mg Intravenous Q6H PRN Christopher A Danis, DO   12.5 mg at 07/30/17 1252   • sodium chloride 0.9 % flush 1-10 mL  1-10 mL Intravenous PRN Carlin Landers MD       • sodium chloride 0.9 % flush 10 mL  10 mL Intravenous PRN GUANACO Mera       • sodium chloride 0.9 % infusion  75 mL/hr Intravenous Continuous Christopher A Danis, DO 75 mL/hr at 07/30/17 2050 75 mL/hr at 07/30/17 2050   • sodium hypochlorite (DAKIN'S) topical solution 0.25% (half strength)   Irrigation BID Sung Blair MD       • sucralfate (CARAFATE) tablet 1 g  1 g Oral 4x Daily Christopher A Danis, DO   1 g at 07/31/17 0834   • topiramate (TOPAMAX) tablet 100 mg  100 mg Oral TID Christopher A Danis, DO   100 mg at 07/31/17 0833   • traZODone (DESYREL) tablet 50 mg  50 mg Oral Nightly PRN Christopher A Danis, DO   50 mg at 07/29/17 2118   • vancomycin (VANCOCIN) 2,000 mg in sodium chloride 0.9 % 500 mL IVPB  2,000 mg Intravenous Q12H Sneha Harrell [facility]   2,000 mg at 07/31/17 0004   • vitamin C (ASCORBIC ACID) tablet 250 mg  250 mg Oral BID Christopher A Danis, DO   250 mg at 07/31/17 0833   • vitamin D3 capsule 5,000 Units  5,000 Units Oral Daily Christopher A Danis, DO   5,000 Units at 07/31/17 0834     Allergies   Allergen Reactions   • Keflex [Cephalexin] Rash     Patient states \"red man syndrome\"        Review of Systems   Constitution: Negative.   HENT: Negative.    Eyes: Negative.    Cardiovascular: Negative.    Respiratory: Negative.    Endocrine: Negative.    Hematologic/Lymphatic: Negative.    Skin: Negative.    Musculoskeletal:        Pertinent positives listed in HPI   Gastrointestinal: Negative.    Genitourinary: Negative.    Neurological: Negative.    Psychiatric/Behavioral: Negative.    Allergic/Immunologic: Negative.            Vitals:    " 07/30/17 2353 07/31/17 0455 07/31/17 0624 07/31/17 0805   BP:  132/74  141/89   BP Location:  Right arm  Right arm   Patient Position:  Lying  Lying   Pulse:  80  80   Resp:    20   Temp:    98.7 °F (37.1 °C)   TempSrc:    Axillary   SpO2: 99%  98%    Weight:  261 lb 3.2 oz (118 kg)     Height:            Physical Exam   Constitutional: He is oriented to person, place, and time. He appears well-developed and well-nourished.   HENT:   Head: Normocephalic and atraumatic.   Mouth/Throat: Oropharynx is clear and moist.   Eyes: Conjunctivae and EOM are normal. Pupils are equal, round, and reactive to light.   Neck: Normal range of motion. Neck supple. No JVD present. No thyromegaly present.   Cardiovascular: Normal rate, regular rhythm and normal heart sounds.  Exam reveals no gallop and no friction rub.    No murmur heard.  Pulmonary/Chest: Effort normal and breath sounds normal. No respiratory distress. He has no wheezes. He has no rales. He exhibits no tenderness.   Abdominal: Bowel sounds are normal.   Musculoskeletal:   Right buttock wound reveals wound VAC with erythema and foul odor. Right hip reveals passive painful range of motion with trace edema right lower extremity. Remainder of the range of motion examination was deferred secondary to patient's history of paraplegia. Left lower extremity reveals evidence of previous AKA.     Lymphadenopathy:     He has no cervical adenopathy.   Neurological: He is alert and oriented to person, place, and time.   Skin: Skin is warm and dry.   Psychiatric: He has a normal mood and affect.   Nursing note and vitals reviewed.      Labs:      Results from last 7 days  Lab Units 07/31/17  0447 07/30/17  0111 07/29/17  0528 07/28/17  2314   CRP mg/dL 16.24* 27.31* 20.15*  --    LACTATE mmol/L  --   --   --  2.0   WBC 10*3/mm3 10.75 12.46  --  24.12*   HEMOGLOBIN g/dL 9.1* 9.1*  --  10.6*   HEMATOCRIT % 29.2* 30.5*  --  34.4*   MCV fL 89.3 90.2  --  89.1   MCHC g/dL 31.2* 29.8*  --   30.8*   PLATELETS 10*3/mm3 437* 444*  --  455*       Results from last 7 days  Lab Units 07/29/17  0804   PH, ARTERIAL pH units 7.335*   PO2 ART mm Hg 127.4*   PCO2, ARTERIAL mm Hg 36.9   HCO3 ART mmol/L 19.2*       Results from last 7 days  Lab Units 07/31/17  0447 07/30/17  0420 07/30/17  0111  07/28/17  2314   SODIUM mmol/L 139  --  140  --  138   POTASSIUM mmol/L 3.5 4.3 3.4*  < > 3.3*   MAGNESIUM mg/dL 1.9  --  1.8  --   --    CHLORIDE mmol/L 111  --  113*  --  108   CO2 mmol/L 20.7*  --  20.1*  --  20.6*   BUN mg/dL 9  --  13  --  24*   CREATININE mg/dL 0.52  --  0.67  --  1.04   EGFR IF NONAFRICN AM mL/min/1.73 >150  --  132  --  80   CALCIUM mg/dL 8.8  --  8.4  --  9.3   GLUCOSE mg/dL 97  --  103  --  146*   ALBUMIN g/dL 3.10*  --  3.10*  --  3.70   BILIRUBIN mg/dL 0.7  --  0.9  --  1.6   ALK PHOS U/L 165*  --  139*  --  149*   AST (SGOT) U/L 58*  --  56*  --  39*   ALT (SGPT) U/L 54*  --  45*  --  34   < > = values in this interval not displayed.Estimated Creatinine Clearance: 249.3 mL/min (by C-G formula based on Cr of 0.52).  No results found for: AMMONIA    Results from last 7 days  Lab Units 07/29/17  0107 07/28/17  2314   TROPONIN I ng/mL 0.010 0.012         Hemoglobin A1C   Date Value Ref Range Status   07/28/2017 5.70 4.50 - 5.70 % Final     Lab Results   Component Value Date    TSH 0.642 04/07/2015         Pain Management Panel     Pain Management Panel Latest Ref Rng & Units 7/29/2017    AMPHETAMINES SCREEN, URINE Negative Negative    BARBITURATES SCREEN Negative Negative    BENZODIAZEPINE SCREEN, URINE Negative Negative    BUPRENORPHINE Negative Negative    COCAINE SCREEN, URINE Negative Negative    METHADONE SCREEN, URINE Negative Negative          Blood Culture   Date Value Ref Range Status   07/29/2017 No growth at 24 hours  Preliminary   07/29/2017 No growth at 24 hours  Preliminary   07/28/2017 Gram positive cocci, consistent with Staph spp (A)  Preliminary   07/28/2017 No growth at 2 days   Preliminary     Urine Culture   Date Value Ref Range Status   07/29/2017 >100,000 CFU/mL Escherichia coli (A)  Final     Wound Culture   Date Value Ref Range Status   07/29/2017 Culture in progress  Preliminary            Radiology:       Imaging Results (last 72 hours)     Procedure Component Value Units Date/Time    CT Head Without Contrast [[id number]] Collected:  07/29/17 0840     Updated:  07/29/17 0843    Narrative:       EXAMINATION: CT HEAD WO CONTRAST-      CLINICAL INDICATION:     ams     COMPARISON:    None     Technique: Multiple CT axial images were obtained through the level of  the brain without IV contrast administration. Reformatted images in the  coronal and/or sagittal plane(s) were generated from the axial data set  to facilitate diagnostic accuracy and/or surgical planning.     Radiation dose reduction techniques were utilized per ALARA protocol.  Automated exposure control was initiated through either or [employer] or  [employer]Right software packages by  protocol.       DOSE (DLP mGy-cm): 1173.4 mGy.cm     FINDINGS:   No acute intracranial abnormality. No midline shift or mass  effect. No hydrocephalus or intracranial hemorrhage. There is no CT  evidence of acute vascular territory infarct.  Bone windows show no  acute osseous abnormality.       Impression:       No CT evidence of acute intracranial abnormality.     This report was finalized on 7/29/2017 8:41 AM by Dr. Sreedhar Gray MD.       XR Chest 1 View [[id number]] Collected:  07/29/17 0841     Updated:  07/29/17 0843    Narrative:       EXAMINATION: XR CHEST 1 VW-      CLINICAL INDICATION:     fever, ams     TECHNIQUE:  XR CHEST 1 VW-      COMPARISON: NONE      FINDINGS:   Lungs are aerated.   Cardiomegaly.   No pneumothorax.   No pleural effusion.   No acute osseous findings.  Cervicothoracic fusion.       Impression:       Cardiomegaly. No acute cardiopulmonary findings.     This report was finalized on 7/29/2017 8:41 AM by   Sreedhar Gray MD.       CT Abdomen Pelvis With Contrast [[id number]] Collected:  07/29/17 0905     Updated:  07/29/17 0913    Narrative:          CT ABDOMEN PELVIS W CONTRAST-        TECHNIQUE: Multiple axial CT images were obtained from lung bases  through pubic symphysis with administration of IV contrast. Reformatted  images in the coronal and/or sagittal plane(s) were generated from the  axial data set to facilitate diagnostic accuracy and/or surgical  planning.  Oral Contrast:NONE.     Radiation dose reduction techniques were utilized per ALARA protocol.  Automated exposure control was initiated through either or [employer] or  [employer] software packages by  protocol.       Radiation dose reduction techniques were utilized per ALARA protocol.  Automated exposure control was initiated through either or [employer] or  [employer] software packages by  protocol.               Clinical information  fever      Comparison  NONE.     Findings  LOWER THORAX: LEFT BASILAR ATELECTASIS.     ABDOMEN:        LIVER: MARKED DIFFUSE FATTY INFILTRATION OF LIVER.        GALLBLADDER: GALLBLADDER IS SURGICALLY ABSENT.        PANCREAS: Unremarkable. No mass or ductal dilatation.        SPLEEN: Homogeneous. No splenomegaly.        ADRENALS: No mass.        KIDNEYS: NO HYDRONEPHROSIS IS NOTED.        GI TRACT: PATIENT HAS HAD CHANGES OF A PROBABLE SIGMOID COLON  RESECTION WITH A LEFT LOWER QUADRANT DIVERTING COLOSTOMY NOTED. NO BOWEL  OBSTRUCTION IDENTIFIED.        PERITONEUM: No free air. No free fluid or loculated fluid  collections.        MESENTERY: Unremarkable.        LYMPH NODES: No lymphadenopathy.        VASCULATURE: No evidence of aneurysm.        ABDOMINAL WALL: No focal hernia or mass.           OTHER: LEFT GLUTEAL REGION CELLULITIS WITHOUT ULCERATION. RIGHT  DECUBITUS ULCER NOTED WITH DESTRUCTIVE OSSEOUS CHANGES OF THE RIGHT  ISSUE HIM AND INFERIOR PUBIC RAMUS MOST CONSISTENT WITH OSTEOMYELITIS.      PELVIS:        BLADDER: No focal mass or significant wall thickening        REPRODUCTIVE: QUESTION CHANGES OF A PROBABLE PROSTATE RESECTION GIVEN  A BULBOUS APPEARANCE OF THE PROSTHETIC URETHRA THAT IS ESSENTIALLY  STABLE FROM PREVIOUS BUT HAS A MAXIMAL TRANSVERSE DIAMETER OF 4.2 CM.        APPENDIX: Nondistended. No surrounding inflammation.     BONES: DESTRUCTIVE OSSEOUS CHANGES OF THE RIGHT ISSUE HIM SUBJACENT TO  DECUBITUS ULCER CONCERNING FOR OSTEOMYELITIS. CHRONIC EROSIVE CHANGES  WITH COMPLETE OSTEOLYSIS OF THE RIGHT FEMORAL HEAD. THIS COULD ALSO BE  SEEN IN INFECTIOUS ETIOLOGY ALTHOUGH CHRONIC INFLAMMATORY ARTHROPATHY IS  FAVORED.       Impression:       Impression:  1. LEFT SIDE GLUTEAL REGION CELLULITIS WITHOUT EVIDENCE OF DEFINITE  ABSCESS OR SIGNIFICANT ULCERATION.  2. RIGHT DECUBITUS ULCER EXTENDING TO THE INITIAL BONE. QUESTION SOME  DESTRUCTIVE OSSEOUS CHANGE OF THE RIGHT INITIAL TUBEROSITY WHICH CAN BE  SEEN WITH OSTEOMYELITIS.  3. PARTIAL SIGMOID COLON RESECTION WITH LEFT LOWER QUADRANT DIVERTING  ILEOSTOMY. NO BOWEL OBSTRUCTION.  4. PROBABLE CHRONIC INFLAMMATORY ARTHROPATHY SEQUELA OF THE RIGHT  FEMORAL NECK VERSUS NONUNION OF AN OLD RIGHT FEMORAL NECK FRACTURE.  5. OTHER NONACUTE FINDINGS AS ABOVE.        This report was finalized on 7/29/2017 9:10 AM by Dr. Sreedhar Gray MD.       CT Chest With Contrast [[id number]] Collected:  07/29/17 0911     Updated:  07/29/17 0915    Narrative:       EXAM: CT CHEST W CONTRAST-              CLINICAL INDICATION:sepsis unknown source      COMPARISON: NONE.     TECHNIQUE: Multiple axial CT images were obtained from lung apex through  upper abdomen with administration of IV contrast. Reformatted images in  the coronal and/or sagittal plane(s) were generated from the axial data  set to facilitate diagnostic accuracy and/or surgical planning.  Oral Contrast:NONE.     Radiation dose reduction techniques were utilized per ALARA protocol.  Automated exposure control  was initiated through either or [employer] or  [employer] software packages by  protocol.    DOSE (DLP mGy-cm): 3025.22 mGy.cm        FINDINGS:     LUNGS: Pulmonary vascular congestion. Probable atelectasis of the  lingula. Tiny right lung nodules favor granulomatous change. No  suspicious nodules identified.     HEART: Mild cardiomegaly.     PERICARDIUM: No effusion.     MEDIASTINUM: No masses. No enlarged lymph nodes.  No fluid collections.     PLEURA: No pleural effusion. No pleural mass or abnormal calcification.     MAJOR AIRWAYS: Clear. No intrinsic mass.     VASCULATURE: No evidence of aneurysm.     VISUALIZED UPPER ABDOMEN:        OTHER: None.     BONES: Cervicothoracic spinal fusion.       Impression:       1. Cardiac megaly and pulmonary vascular congestion.  2. Lingular atelectasis.  3. Fatty infiltration of liver.  4. Otherwise no acute thoracic findings identified.     This report was finalized on 7/29/2017 9:13 AM by Dr. Sreedhar Gray MD.                 Assesment:    ICD-10-CM ICD-9-CM   1. Sepsis, due to unspecified organism A41.9 038.9     995.91   2. Acute osteomyelitis of other site M86.18          Plan:    Agree with infectious disease plan to continue with antibiotic profile. Await wound culture results. General surgery and I&D to address gluteal wound. In reference to the right hip pain as well as the Diagnostic imaging revealing a possible chronic inflammatory arthropathy, MRI pelvis ordered to gain a better insight of the ischial tuberosity changes thought to represent osteomyelitis as well to rule out involvement of the femoracetabular joint as he does have the osteolysis of the femoral head           This document was signed by Umang Montanez PA-C July 31, 2017        Electronically signed by Bryce Mason MD at 7/31/2017  6:50 PM           Nutrition Notes (most recent note)      Rin Medina RD at 7/29/2017 11:06 AM  Version 1 of 1         Nutrition  Services    Patient Name:  Ken Krueger  YOB: 1975  MRN: [id number]  Admit Date:  7/28/2017        Rec MVI daily to aide with wound healing.      Electronically signed by:  Rin Medina RD  07/29/17 11:06 AM      Electronically signed by Rin Medina RD at 7/29/2017 11:06 AM        Physical Therapy Notes (most recent note)     No notes of this type exist for this encounter.        Occupational Therapy Notes (most recent note)     No notes of this type exist for this encounter.        Speech Language Pathology Notes (most recent note)     No notes of this type exist for this encounter.        Respiratory Therapy Notes (most recent note)     No notes of this type exist for this encounter.

## 2017-08-02 LAB
ALBUMIN SERPL-MCNC: 3.4 G/DL (ref 3.5–5)
ALBUMIN/GLOB SERPL: 0.8 G/DL (ref 1.5–2.5)
ALP SERPL-CCNC: 183 U/L (ref 40–129)
ALT SERPL W P-5'-P-CCNC: 35 U/L (ref 10–44)
ANION GAP SERPL CALCULATED.3IONS-SCNC: 8.9 MMOL/L (ref 3.6–11.2)
AST SERPL-CCNC: 27 U/L (ref 10–34)
BACTERIA SPEC AEROBE CULT: ABNORMAL
BILIRUB SERPL-MCNC: 0.5 MG/DL (ref 0.2–1.8)
BUN BLD-MCNC: 10 MG/DL (ref 7–21)
BUN/CREAT SERPL: 16.7 (ref 7–25)
CALCIUM SPEC-SCNC: 9.2 MG/DL (ref 7.7–10)
CHLORIDE SERPL-SCNC: 107 MMOL/L (ref 99–112)
CO2 SERPL-SCNC: 21.1 MMOL/L (ref 24.3–31.9)
CREAT BLD-MCNC: 0.6 MG/DL (ref 0.43–1.29)
CRP SERPL-MCNC: 14.08 MG/DL (ref 0–0.99)
DEPRECATED RDW RBC AUTO: 43.9 FL (ref 37–54)
EOSINOPHIL # BLD MANUAL: 1.16 10*3/MM3 (ref 0–0.7)
EOSINOPHIL NFR BLD MANUAL: 8 % (ref 0–5)
ERYTHROCYTE [DISTWIDTH] IN BLOOD BY AUTOMATED COUNT: 14 % (ref 11.5–14.5)
GFR SERPL CREATININE-BSD FRML MDRD: 150 ML/MIN/1.73
GLOBULIN UR ELPH-MCNC: 4.4 GM/DL
GLUCOSE BLD-MCNC: 96 MG/DL (ref 70–110)
GLUCOSE BLDC GLUCOMTR-MCNC: 132 MG/DL (ref 70–130)
GRAM STN SPEC: ABNORMAL
HCT VFR BLD AUTO: 31.4 % (ref 42–52)
HGB BLD-MCNC: 9.6 G/DL (ref 14–18)
HYPOCHROMIA BLD QL: ABNORMAL
LYMPHOCYTES # BLD MANUAL: 3.76 10*3/MM3 (ref 1–3)
LYMPHOCYTES NFR BLD MANUAL: 26 % (ref 21–51)
LYMPHOCYTES NFR BLD MANUAL: 3 % (ref 0–10)
MAGNESIUM SERPL-MCNC: 1.9 MG/DL (ref 1.7–2.6)
MCH RBC QN AUTO: 27 PG (ref 27–33)
MCHC RBC AUTO-ENTMCNC: 30.6 G/DL (ref 33–37)
MCV RBC AUTO: 88.5 FL (ref 80–94)
METAMYELOCYTES NFR BLD MANUAL: 1 % (ref 0–0)
MONOCYTES # BLD AUTO: 0.43 10*3/MM3 (ref 0.1–0.9)
NEUTROPHILS # BLD AUTO: 8.97 10*3/MM3 (ref 1.4–6.5)
NEUTROPHILS NFR BLD MANUAL: 62 % (ref 30–70)
OSMOLALITY SERPL CALC.SUM OF ELEC: 272.7 MOSM/KG (ref 273–305)
PHOSPHATE SERPL-MCNC: 4.1 MG/DL (ref 2.7–4.5)
PLATELET # BLD AUTO: 535 10*3/MM3 (ref 130–400)
PMV BLD AUTO: 9.8 FL (ref 6–10)
POTASSIUM BLD-SCNC: 3.7 MMOL/L (ref 3.5–5.3)
PROT SERPL-MCNC: 7.8 G/DL (ref 6–8)
RBC # BLD AUTO: 3.55 10*6/MM3 (ref 4.7–6.1)
SMALL PLATELETS BLD QL SMEAR: ABNORMAL
SODIUM BLD-SCNC: 137 MMOL/L (ref 135–153)
STREP GROUPING: ABNORMAL
WBC NRBC COR # BLD: 14.46 10*3/MM3 (ref 4.5–12.5)

## 2017-08-02 PROCEDURE — 83735 ASSAY OF MAGNESIUM: CPT | Performed by: INTERNAL MEDICINE

## 2017-08-02 PROCEDURE — 25010000002 HEPARIN (PORCINE) PER 1000 UNITS: Performed by: INTERNAL MEDICINE

## 2017-08-02 PROCEDURE — 25010000002 VANCOMYCIN PER 500 MG

## 2017-08-02 PROCEDURE — 99232 SBSQ HOSP IP/OBS MODERATE 35: CPT | Performed by: INTERNAL MEDICINE

## 2017-08-02 PROCEDURE — 25010000002 LEVOFLOXACIN PER 250 MG: Performed by: INTERNAL MEDICINE

## 2017-08-02 PROCEDURE — 85007 BL SMEAR W/DIFF WBC COUNT: CPT | Performed by: INTERNAL MEDICINE

## 2017-08-02 PROCEDURE — 94660 CPAP INITIATION&MGMT: CPT

## 2017-08-02 PROCEDURE — 94799 UNLISTED PULMONARY SVC/PX: CPT

## 2017-08-02 PROCEDURE — 85025 COMPLETE CBC W/AUTO DIFF WBC: CPT | Performed by: INTERNAL MEDICINE

## 2017-08-02 PROCEDURE — 82962 GLUCOSE BLOOD TEST: CPT

## 2017-08-02 PROCEDURE — 86140 C-REACTIVE PROTEIN: CPT | Performed by: INTERNAL MEDICINE

## 2017-08-02 PROCEDURE — 84100 ASSAY OF PHOSPHORUS: CPT | Performed by: INTERNAL MEDICINE

## 2017-08-02 PROCEDURE — 80053 COMPREHEN METABOLIC PANEL: CPT | Performed by: INTERNAL MEDICINE

## 2017-08-02 RX ORDER — MAGNESIUM SULFATE HEPTAHYDRATE 40 MG/ML
4 INJECTION, SOLUTION INTRAVENOUS ONCE
Status: COMPLETED | OUTPATIENT
Start: 2017-08-02 | End: 2017-08-02

## 2017-08-02 RX ADMIN — HEPARIN SODIUM 5000 UNITS: 5000 INJECTION, SOLUTION INTRAVENOUS; SUBCUTANEOUS at 21:31

## 2017-08-02 RX ADMIN — TOPIRAMATE 100 MG: 100 TABLET, FILM COATED ORAL at 17:13

## 2017-08-02 RX ADMIN — Medication 250 MG: at 09:15

## 2017-08-02 RX ADMIN — MODAFINIL 200 MG: 100 TABLET ORAL at 09:15

## 2017-08-02 RX ADMIN — ACETAMINOPHEN 650 MG: 325 TABLET, FILM COATED ORAL at 17:13

## 2017-08-02 RX ADMIN — METHENAMINE HIPPURATE 1 G: 1 TABLET ORAL at 10:20

## 2017-08-02 RX ADMIN — NYSTATIN: 100000 POWDER TOPICAL at 21:32

## 2017-08-02 RX ADMIN — SUCRALFATE 1 G: 1 TABLET ORAL at 21:31

## 2017-08-02 RX ADMIN — CHOLECALCIFEROL CAP 125 MCG (5000 UNIT) 5000 UNITS: 125 CAP at 10:21

## 2017-08-02 RX ADMIN — SODIUM HYPOCHLORITE: 2.5 SOLUTION TOPICAL at 09:16

## 2017-08-02 RX ADMIN — VANCOMYCIN HYDROCHLORIDE 2000 MG: 5 INJECTION, POWDER, LYOPHILIZED, FOR SOLUTION INTRAVENOUS at 14:35

## 2017-08-02 RX ADMIN — SUCRALFATE 1 G: 1 TABLET ORAL at 17:13

## 2017-08-02 RX ADMIN — SUCRALFATE 1 G: 1 TABLET ORAL at 09:14

## 2017-08-02 RX ADMIN — GABAPENTIN 400 MG: 400 CAPSULE ORAL at 06:27

## 2017-08-02 RX ADMIN — HEPARIN SODIUM 5000 UNITS: 5000 INJECTION, SOLUTION INTRAVENOUS; SUBCUTANEOUS at 06:27

## 2017-08-02 RX ADMIN — MAGNESIUM SULFATE HEPTAHYDRATE 4 G: 40 INJECTION, SOLUTION INTRAVENOUS at 10:21

## 2017-08-02 RX ADMIN — TOPIRAMATE 100 MG: 100 TABLET, FILM COATED ORAL at 21:31

## 2017-08-02 RX ADMIN — PANTOPRAZOLE SODIUM 40 MG: 40 TABLET, DELAYED RELEASE ORAL at 06:28

## 2017-08-02 RX ADMIN — CASTOR OIL AND BALSAM, PERU: 788; 87 OINTMENT TOPICAL at 09:15

## 2017-08-02 RX ADMIN — METHENAMINE HIPPURATE 1 G: 1 TABLET ORAL at 21:32

## 2017-08-02 RX ADMIN — GABAPENTIN 400 MG: 400 CAPSULE ORAL at 14:36

## 2017-08-02 RX ADMIN — TOPIRAMATE 100 MG: 100 TABLET, FILM COATED ORAL at 09:15

## 2017-08-02 RX ADMIN — Medication 1 TABLET: at 09:15

## 2017-08-02 RX ADMIN — HEPARIN SODIUM 5000 UNITS: 5000 INJECTION, SOLUTION INTRAVENOUS; SUBCUTANEOUS at 14:36

## 2017-08-02 RX ADMIN — CASTOR OIL AND BALSAM, PERU: 788; 87 OINTMENT TOPICAL at 21:32

## 2017-08-02 RX ADMIN — NYSTATIN: 100000 POWDER TOPICAL at 09:15

## 2017-08-02 RX ADMIN — DULOXETINE HYDROCHLORIDE 60 MG: 60 CAPSULE, DELAYED RELEASE ORAL at 09:15

## 2017-08-02 RX ADMIN — SODIUM HYPOCHLORITE: 2.5 SOLUTION TOPICAL at 21:32

## 2017-08-02 RX ADMIN — BACLOFEN 30 MG: 10 TABLET ORAL at 10:21

## 2017-08-02 RX ADMIN — VANCOMYCIN HYDROCHLORIDE 2000 MG: 5 INJECTION, POWDER, LYOPHILIZED, FOR SOLUTION INTRAVENOUS at 01:40

## 2017-08-02 RX ADMIN — LEVOFLOXACIN 500 MG: 5 INJECTION, SOLUTION INTRAVENOUS at 09:15

## 2017-08-02 RX ADMIN — GABAPENTIN 400 MG: 400 CAPSULE ORAL at 21:32

## 2017-08-02 RX ADMIN — DULOXETINE HYDROCHLORIDE 60 MG: 60 CAPSULE, DELAYED RELEASE ORAL at 17:12

## 2017-08-02 RX ADMIN — SUCRALFATE 1 G: 1 TABLET ORAL at 12:51

## 2017-08-02 RX ADMIN — Medication 250 MG: at 17:12

## 2017-08-02 NOTE — PLAN OF CARE
Problem: Patient Care Overview (Adult)  Goal: Plan of Care Review  Outcome: Ongoing (interventions implemented as appropriate)    08/02/17 1500   Coping/Psychosocial Response Interventions   Plan Of Care Reviewed With patient   Patient Care Overview   Progress progress toward functional goals as expected       Goal: Adult Individualization and Mutuality  Outcome: Outcome(s) achieved Date Met:  08/02/17  Goal: Discharge Needs Assessment  Outcome: Ongoing (interventions implemented as appropriate)    Problem: Skin Integrity Impairment, Risk/Actual (Adult)  Goal: Identify Related Risk Factors and Signs and Symptoms  Outcome: Ongoing (interventions implemented as appropriate)  Goal: Skin Integrity/Wound Healing  Outcome: Ongoing (interventions implemented as appropriate)    08/02/17 1500   Skin Integrity Impairment, Risk/Actual (Adult)   Skin Integrity/Wound Healing making progress toward outcome         Problem: Pressure Ulcer (Adult)  Goal: Signs and Symptoms of Listed Potential Problems Will be Absent or Manageable (Pressure Ulcer)  Outcome: Ongoing (interventions implemented as appropriate)    08/02/17 1500   Pressure Ulcer   Problems Assessed (Pressure Ulcer) all   Problems Present (Pressure Ulcer) pain;wound complications         Problem: Pressure Ulcer Risk (Alejandro Scale) (Adult,Obstetrics,Pediatric)  Goal: Identify Related Risk Factors and Signs and Symptoms  Outcome: Ongoing (interventions implemented as appropriate)  Goal: Skin Integrity  Outcome: Ongoing (interventions implemented as appropriate)    08/02/17 1500   Pressure Ulcer Risk (Alejandro Scale) (Adult,Obstetrics,Pediatric)   Skin Integrity making progress toward outcome

## 2017-08-02 NOTE — PROGRESS NOTES
"  I have personally seen and examined the patient today and discussed overnight interval progress and pertinent issues with nursing staff.    Subjective    The patient's surgery has been postponed.  Status post aspiration to the right hip yesterday with culture showing no growth so far.  No fever or diarrhea reported.  Continues with persistent leukocytosis but slightly improved CRP level.       Past Medical History:   Diagnosis Date   • Asthma    • Cancer     skin cancer on right arm   • Diabetes mellitus    • History of transfusion    • Hyperlipidemia    • Hypertension    • Paraplegia    • Sleep apnea        History reviewed. No pertinent family history.    Social History     Social History   • Marital status:      Spouse name: N/A   • Number of children: N/A   • Years of education: N/A     Occupational History   • Not on file.     Social History Main Topics   • Smoking status: Never Smoker   • Smokeless tobacco: Not on file   • Alcohol use Yes      Comment: occassional    • Drug use: Yes     Special: Marijuana   • Sexual activity: Defer     Other Topics Concern   • Not on file     Social History Narrative         ALLERGIES:    Allergies   Allergen Reactions   • Keflex [Cephalexin] Rash     Patient states \"red man syndrome\"         History taken from: patient chart RN      Vital Signs    /75 (BP Location: Left arm, Patient Position: Lying)  Pulse 77  Temp 97.8 °F (36.6 °C) (Oral)   Resp 20  Ht 71\" (180.3 cm)  Wt 268 lb 9.6 oz (122 kg)  SpO2 98%  BMI 37.46 kg/m2    Temp:  [97.8 °F (36.6 °C)-98.8 °F (37.1 °C)] 97.8 °F (36.6 °C)      Intake/Output Summary (Last 24 hours) at 08/02/17 1057  Last data filed at 08/02/17 1006   Gross per 24 hour   Intake             2300 ml   Output             4675 ml   Net            -2375 ml     Intake & Output (last 3 days)       07/30 0701 - 07/31 0700 07/31 0701 - 08/01 0700 08/01 0701 - 08/02 0700 08/02 0701 - 08/03 0700    P.O.  360    I.V. (mL/kg) "        IV Piggyback 650 500 600     Irrigation  1      Total Intake(mL/kg) 1490 (12.6) 1071 (8.8) 2040 (16.7) 360 (3)    Urine (mL/kg/hr) 3300 (1.2) 4300 (1.5) 4675 (1.6) 750 (1.6)    Stool 0 (0) 1100 (0.4) 500 (0.2) 125 (0.3)    Total Output 3300 5400 5175 875    Net -1810 -4329 -3135 -515            Unmeasured Stool Occurrence 0 x           Physical Exam:       General Appearance:    Lethargic, cooperative, in no acute distress   Head:    Normocephalic, without obvious abnormality, atraumatic   Eyes:            Lids and lashes normal, conjunctivae and sclerae normal, no   icterus, no pallor, corneas clear, PERRLA   Ears:    Ears appear intact with no abnormalities noted   Throat:   No oral lesions, no thrush, oral mucosa moist   Neck:   No adenopathy, supple, trachea midline, no thyromegaly, no   carotid bruit, no JVD   Back:     No tenderness to percussion or palpation, range of motion   normal   Lungs:     Clear to auscultation,respirations regular, even and unlabored. No wheezing, no ronchi and no crackles.    Heart:    Regular rhythm and normal rate, normal S1 and S2, no            murmur, no gallop, no rub, no click   Chest Wall:    No abnormalities observed   Abdomen:     Positive for colostomy and urostomy Normal bowel sounds, no masses, no organomegaly, soft, non-tender, non-distended, no guarding, no rebound                tenderness   Rectal:     Deferred   Extremities:  Paraplegia    Pulses:   Pulses palpable and equal bilaterally   Skin:   Right buttocks ulcer with wound VAC in place and surrounding erythema as well as warmth.     Lymph nodes:   No palpable adenopathy   Neurologic:   Awake, lethargic. Following commands.Paraplegia              Results:      Results from last 7 days  Lab Units 08/02/17  0155 08/01/17  0058 07/31/17  0447 07/30/17  0111 07/28/17  2314   WBC 10*3/mm3 14.46* 13.54* 10.75 12.46 24.12*     Lab Results   Component Value Date    NEUTROABS 8.97 (H) 08/02/2017         Results  from last 7 days  Lab Units 08/02/17  0155   CREATININE mg/dL 0.60         Results from last 7 days  Lab Units 08/02/17  0155 08/01/17  0058 07/31/17  0447   CRP mg/dL 14.08* 14.28* 16.24*       Imaging Results (last 24 hours)     Procedure Component Value Units Date/Time    US Guided Fine Needle Aspiration [[id number]] Updated:  08/01/17 1529    CT Guided Needle Aspiration [[id number]] Collected:  08/01/17 1547     Updated:  08/01/17 1624    Narrative:       EXAMINATION: CT GUIDED NEEDLE ASPIRATION-      CLINICAL INDICATION:  PAIN      TECHNIQUE: The procedure was discussed with the patient. The patient  seemed to understand the indications, alternatives, potential risks and  benefits and agreed to proceed. The appropriate side was confirmed with  the patient. The area was appropriately prepared and local anesthetic  was instilled. An 18 gauge needle was introduced into the right hip  joint. No significant fluid was aspirated and approximately 5 mL normal  saline was introduced using CT monitoring. Aspiration was then performed  retrieve and about 1 cc of slightly turbid liquid. COMPARISON: None     FINDINGS: An 18 gauge needle was introduced into the right hip joint. No  significant fluid was aspirated and approximately 5 mL normal saline was  introduced using CT monitoring. Aspiration was then performed retrieve  and about 1 cc of slightly turbid liquid.        Impression:       Limited aspirate obtained from the right hip joint. 1 cc of  fluid obtained was sent to laboratory.     This report was finalized on 8/1/2017 3:49 PM by Dr. Yoshi Hooper MD.               Results Review:    I have personally reviewed laboratory data, culture results, radiology studies and antimicrobial therapy.    Hospital Medications (active)       Dose Frequency Start End    acetaminophen (TYLENOL) tablet 650 mg 650 mg Every 6 Hours PRN 7/29/2017     Sig - Route: Take 2 tablets by mouth Every 6 (Six) Hours As Needed for Mild Pain (1-3).  - Oral    acetaminophen (TYLENOL) tablet 650 mg 650 mg Every 6 Hours PRN 7/29/2017     Sig - Route: Take 2 tablets by mouth Every 6 (Six) Hours As Needed for Mild Pain (1-3). - Oral    baclofen (LIORESAL) tablet 30 mg 30 mg Every 8 Hours PRN 7/29/2017     Sig - Route: Take 3 tablets by mouth Every 8 (Eight) Hours As Needed for Muscle Spasms. - Oral    castor oil-balsam peru (VENELEX) ointment  Every 12 Hours Scheduled 7/29/2017     Sig - Route: Apply  topically Every 12 (Twelve) Hours. - Topical    Cosign for Ordering: Required by Carlin Landers MD    dextrose (D50W) solution 25 g 25 g Every 15 Minutes PRN 7/29/2017     Sig - Route: Infuse 50 mL into a venous catheter Every 15 (Fifteen) Minutes As Needed for Low Blood Sugar (Blood Sugar Less Than 70, Patient Has IV Access - Unresponsive, NPO or Unable To Safely Swallow). - Intravenous    dextrose (D50W) solution 25 g 25 g Every 15 Minutes PRN 7/29/2017     Sig - Route: Infuse 50 mL into a venous catheter Every 15 (Fifteen) Minutes As Needed for Low Blood Sugar (Blood Sugar Less Than 70, Patient Has IV Access - Unresponsive, NPO or Unable To Safely Swallow). - Intravenous    dextrose (GLUTOSE) oral gel 15 g 15 g Every 15 Minutes PRN 7/29/2017     Sig - Route: Take 15 g by mouth Every 15 (Fifteen) Minutes As Needed for Low Blood Sugar (Blood Sugar Less Than 70, Patient Alert, Is Not NPO & Can Safely Swallow). - Oral    dextrose (GLUTOSE) oral gel 15 g 15 g Every 15 Minutes PRN 7/29/2017     Sig - Route: Take 15 g by mouth Every 15 (Fifteen) Minutes As Needed for Low Blood Sugar (Blood Sugar Less Than 70, Patient Alert, Is Not NPO & Can Safely Swallow). - Oral    DULoxetine (CYMBALTA) DR capsule 60 mg 60 mg 2 Times Daily 7/29/2017     Sig - Route: Take 1 capsule by mouth 2 (Two) Times a Day. - Oral    gabapentin (NEURONTIN) capsule 400 mg 400 mg Every 8 Hours Scheduled 7/29/2017     Sig - Route: Take 1 capsule by mouth Every 8 (Eight) Hours. - Oral    glucagon  "(human recombinant) (GLUCAGEN DIAGNOSTIC) injection 1 mg 1 mg Every 15 Minutes PRN 7/29/2017     Sig - Route: Inject 1 mg under the skin Every 15 (Fifteen) Minutes As Needed (Blood Glucose Less Than 70 - Patient Without IV Access - Unresponsive, NPO or Unable To Safely Swallow). - Subcutaneous    glucagon (human recombinant) (GLUCAGEN DIAGNOSTIC) injection 1 mg 1 mg Every 15 Minutes PRN 7/29/2017     Sig - Route: Inject 1 mg under the skin Every 15 (Fifteen) Minutes As Needed (Blood Glucose Less Than 70 - Patient Without IV Access - Unresponsive, NPO or Unable To Safely Swallow). - Subcutaneous    heparin (porcine) 5000 UNIT/ML injection 5,000 Units 5,000 Units Every 8 Hours Scheduled 7/30/2017     Sig - Route: Inject 1 mL under the skin Every 8 (Eight) Hours. - Subcutaneous    ibuprofen (ADVIL,MOTRIN) tablet 600 mg 600 mg 3 Times Daily PRN 7/29/2017     Sig - Route: Take 1 tablet by mouth 3 (Three) Times a Day As Needed for Mild Pain (1-3). - Oral    insulin aspart (novoLOG) injection 0-7 Units 0-7 Units 4 Times Daily Before Meals & Nightly 7/29/2017     Sig - Route: Inject 0-7 Units under the skin 4 (Four) Times a Day Before Meals & at Bedtime. - Subcutaneous    levoFLOXacin (LEVAQUIN) 500 mg/100 mL D5W (premix) (LEVAQUIN) 500 mg 500 mg Every 24 Hours 7/31/2017     Sig - Route: Infuse 100 mL into a venous catheter Daily. - Intravenous    Magnesium Sulfate 2 gram Bolus, followed by 8 gram infusion (total Mg dose 10 grams)- Mg less than or equal to 1mg/dL 2 g As Needed 7/29/2017     Sig - Route: Infuse 50 mL into a venous catheter As Needed (Mg less than or equal to 1mg/dL). - Intravenous    Linked Group 1:  \"Or\" Linked Group Details        magnesium sulfate 4 gram infusion- Mg 1.6-1.9 mg/dL 4 g As Needed 7/29/2017     Sig - Route: Infuse 100 mL into a venous catheter As Needed (Mg 1.6-1.9 mg/dL). - Intravenous    Linked Group 1:  \"Or\" Linked Group Details        magnesium sulfate 4 gram infusion- Mg 1.6-1.9 mg/dL 4 " "g Once 7/31/2017     Sig - Route: Infuse 100 mL into a venous catheter 1 (One) Time. - Intravenous    Magnesium Sulfate 6 gram Infusion (2 gm x 3) -Mg 1.1 -1.5 mg/dL 2 g As Needed 7/29/2017     Sig - Route: Infuse 50 mL into a venous catheter As Needed (Mg 1.1 -1.5 mg/dL). - Intravenous    Linked Group 1:  \"Or\" Linked Group Details        menthol-zinc oxide (CALMOSEPTINE) 0.44-20.625 % ointment  3 Times Daily PRN 7/29/2017     Sig - Route: Apply  topically 3 (Three) Times a Day As Needed (bed sores, crotch, and bottom of stomach). - Topical    methenamine (HIPREX) tablet 1 g 1 g Every 12 Hours Scheduled 7/29/2017     Sig - Route: Take 1 tablet by mouth Every 12 (Twelve) Hours. - Oral    modafinil (PROVIGIL) tablet 200 mg 200 mg Daily 7/29/2017     Sig - Route: Take 2 tablets by mouth Daily. - Oral    multivitamin (DAILY SARAH) tablet 1 tablet 1 tablet Daily 7/30/2017     Sig - Route: Take 1 tablet by mouth Daily. - Oral    nystatin (MYCOSTATIN) powder  Every 12 Hours Scheduled 7/30/2017     Sig - Route: Apply  topically Every 12 (Twelve) Hours. - Topical    pantoprazole (PROTONIX) EC tablet 40 mg 40 mg Every Early Morning 7/29/2017     Sig - Route: Take 1 tablet by mouth Every Morning. - Oral    potassium chloride (KLOR-CON) packet 40 mEq 40 mEq As Needed 7/29/2017     Sig - Route: Take 40 mEq by mouth As Needed (potassium replacement, see admin instructions). - Oral    Linked Group 2:  \"Or\" Linked Group Details        potassium chloride (MICRO-K) CR capsule 40 mEq 40 mEq As Needed 7/29/2017     Sig - Route: Take 4 capsules by mouth As Needed (potassium replacement.  see admin instructions). - Oral    Linked Group 2:  \"Or\" Linked Group Details        potassium chloride 10 mEq in 100 mL IVPB 10 mEq Every 1 Hour PRN 7/29/2017     Sig - Route: Infuse 100 mL into a venous catheter Every 1 (One) Hour As Needed (potassium protocol PERIPHERAL - see admin instructions). - Intravenous    Linked Group 2:  \"Or\" Linked Group " "Details        potassium chloride 10 mEq in 100 mL IVPB 10 mEq Every 1 Hour 7/31/2017 7/31/2017    Sig - Route: Infuse 100 mL into a venous catheter Every 1 (One) Hour. - Intravenous    potassium phosphate 15 mmol in sodium chloride 0.9 % 100 mL infusion 15 mmol As Needed 7/29/2017     Sig - Route: Infuse 15 mmol into a venous catheter As Needed (Peripheral IV - Phosphorus 1.25 - 2.1). - Intravenous    Linked Group 3:  \"Or\" Linked Group Details        potassium phosphate 30 mmol in sodium chloride 0.9 % 250 mL infusion 30 mmol As Needed 7/29/2017     Sig - Route: Infuse 30 mmol into a venous catheter As Needed (Peripheral IV - Phosphorus 0.9 - 1.24). - Intravenous    Linked Group 3:  \"Or\" Linked Group Details        potassium phosphate 45 mmol in sodium chloride 0.9 % 500 mL infusion 45 mmol As Needed 7/29/2017     Sig - Route: Infuse 45 mmol into a venous catheter As Needed (Peripheral IV - Phosphorus Less Than 0.9). - Intravenous    Linked Group 3:  \"Or\" Linked Group Details        promethazine (PHENERGAN) 12.5 mg in sodium chloride 0.9 % 50 mL 12.5 mg Every 6 Hours PRN 7/29/2017     Sig - Route: Infuse 12.5 mg into a venous catheter Every 6 (Six) Hours As Needed for Nausea or Vomiting. - Intravenous    Linked Group 4:  \"Or\" Linked Group Details        promethazine (PHENERGAN) tablet 25 mg 25 mg Every 6 Hours PRN 7/29/2017     Sig - Route: Take 1 tablet by mouth Every 6 (Six) Hours As Needed for Nausea or Vomiting. - Oral    Linked Group 4:  \"Or\" Linked Group Details        sodium chloride 0.9 % flush 1-10 mL 1-10 mL As Needed 7/29/2017     Sig - Route: Infuse 1-10 mL into a venous catheter As Needed for Line Care. - Intravenous    sodium chloride 0.9 % flush 10 mL 10 mL As Needed 7/28/2017     Sig - Route: Infuse 10 mL into a venous catheter As Needed for Line Care. - Intravenous    Cosign for Ordering: Accepted by Gema Matta MD on 7/29/2017 12:09 AM    Linked Group 5:  \"And\" Linked Group Details     " "   sodium chloride 0.9 % infusion 75 mL/hr Continuous 7/29/2017     Sig - Route: Infuse 75 mL/hr into a venous catheter Continuous. - Intravenous    sodium hypochlorite (DAKIN'S) topical solution 0.25% (half strength)  2 Times Daily 7/29/2017     Sig - Route: Irrigate with  as directed 2 (Two) Times a Day. - Irrigation    Cosign for Ordering: Accepted by Sung Blair MD on 7/30/2017  9:37 AM    sodium phosphates 15 mmol in sodium chloride 0.9 % 250 mL IVPB 15 mmol As Needed 7/29/2017     Sig - Route: Infuse 15 mmol into a venous catheter As Needed (Peripheral IV - Phosphorus 1.25 - 2.1 & Potassium Greater Than 4). - Intravenous    Linked Group 3:  \"Or\" Linked Group Details        sodium phosphates 30 mmol in sodium chloride 0.9 % 250 mL IVPB 30 mmol As Needed 7/29/2017     Sig - Route: Infuse 30 mmol into a venous catheter As Needed (Peripheral IV - Phosphorus 0.9 - 1.24 & Potassium Greater Than 4). - Intravenous    Linked Group 3:  \"Or\" Linked Group Details        sodium phosphates 45 mmol in sodium chloride 0.9 % 250 mL IVPB 45 mmol As Needed 7/29/2017     Sig - Route: Infuse 45 mmol into a venous catheter As Needed (Peripheral IV - Phosphorus Less Than 0.9 & Potassium Greater Than 4). - Intravenous    Linked Group 3:  \"Or\" Linked Group Details        sucralfate (CARAFATE) tablet 1 g 1 g 4 Times Daily 7/29/2017     Sig - Route: Take 1 tablet by mouth 4 (Four) Times a Day. - Oral    topiramate (TOPAMAX) tablet 100 mg 100 mg 3 Times Daily 7/29/2017     Sig - Route: Take 1 tablet by mouth 3 (Three) Times a Day. - Oral    traZODone (DESYREL) tablet 50 mg 50 mg Nightly PRN 7/29/2017     Sig - Route: Take 1 tablet by mouth At Night As Needed for Sleep. - Oral    vancomycin (VANCOCIN) 2,000 mg in sodium chloride 0.9 % 500 mL IVPB 2,000 mg Every 12 Hours 7/29/2017     Sig - Route: Infuse 2,000 mg into a venous catheter Every 12 (Twelve) Hours. - Intravenous    vitamin C (ASCORBIC ACID) tablet 250 mg 250 mg 2 Times " Daily 7/29/2017     Sig - Route: Take 0.5 tablets by mouth 2 (Two) Times a Day. - Oral    vitamin D3 capsule 5,000 Units 5,000 Units Daily 7/29/2017     Sig - Route: Take 1 capsule by mouth Daily. - Oral    meropenem (MERREM) 1 g/100 mL 0.9% NS VTB (mbp) (Discontinued) 1 g Every 8 Hours 7/29/2017 7/31/2017    Sig - Route: Infuse 100 mL into a venous catheter Every 8 (Eight) Hours. - Intravenous            Cultures:    Blood Culture   Date Value Ref Range Status   07/29/2017 No growth at 24 hours  Preliminary   07/29/2017 No growth at 24 hours  Preliminary   07/28/2017 Gram positive cocci, consistent with Staph spp (A)  Preliminary   07/28/2017 No growth at 2 days  Preliminary     Urine Culture   Date Value Ref Range Status   07/29/2017 >100,000 CFU/mL Escherichia coli (A)  Final     Wound Culture   Date Value Ref Range Status   07/29/2017 Culture in progress  Preliminary       PROBLEM LIST:    Patient Active Problem List   Diagnosis   • Infected decubitus ulcer   • Decubitus skin ulcer   • Sepsis       Assessment/Plan     ASSESSMENT:    1.  Severe sepsis  2.  Acute osteomyelitis  3.  UTI     PLAN:    Plan for discharge on Friday on a six week course of IV antibiotic therapy guided by culture results.  Case discussed with Dr. Aguilar and .    The patient's surgery has been postponed.  Status post aspiration to the right hip yesterday with culture showing no growth so far.  No fever or diarrhea reported.  Continues with persistent leukocytosis but slightly improved CRP level.     Based on MRI report showing fairly large effusion in the joint space of the right hip status post aspiration orthopedic follow-up is appreciated and consult on 7/31/2017 states agreement with IV antibiotic therapy and I&D of gluteal wound.    UC finalized from 7/29/2017 as E Coli with wound culture and blood cultures still in progress. The patient seems to have multiple infectious disease issues including wound infection,  bacteremia, UTI and for now coverage was changed from vancomycin and merrem to vancomycin and levaquin 500 mg IV q24 hours and pharmacy asked to check on drug to drug interactions or prolonged QTC.    Patient presents with fairly complicated presentation along with persistent severe sepsis, encephalopathy and an infection that presents a life and limb threatening condition.      Patients findings and recommendations were discussed with patient and nursing staff    Code Status: Full Code       Susan Royal PA-C  08/02/17  10:57 AM    Physician Attestation:    The documentation recorded by the scribe accurately reflects the service I personally performed and the decisions made by me.    Tra Jain MD  Infectious Diseases  08/02/17  10:57 AM

## 2017-08-02 NOTE — PROGRESS NOTES
Patient awake alert and oriented ×3.  No apparent acute distress when seen this early afternoon.  No specific complaints    Afebrile and vital signs stable  C-reactive protein 14.08  White count 14.46    Initial hip aspirate cultures negative at 24 hours    Discussed with the radiologist after yesterday's aspiration he thought it just looked like serous fluid did not appear to be purulent in nature.  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  May have changes consistent with acute osteomyelitis of the ischial tuberosity likely secondary to direct inoculation with open wound no significant evidence of sequestrum, or necrotic bone. Agree with current ID recommendations.  Will follow as needed.

## 2017-08-02 NOTE — PROGRESS NOTES
Discharge Planning Assessment   Fabricio     Patient Name: Ken Krueger  MRN: [id number]  Today's Date: 8/2/2017    Admit Date: 7/28/2017       Discharge Plan       08/02/17 1140    Case Management/Social Work Plan    Plan SS spoke to [facility] per Laurence who states Lin Lazaro is reviewing referral. SS spoke to Dr. Aguilar and Dr. Jain. Pt wants to return home with IV antibiotics. Dr. Aguilar anticipates pt will be ready for discharge on Friday, 8-4-17. Home IV antibiotics to be determined after culture results are finalized. SS made [facility] per Laurence aware. SS to follow.         Discharge Placement     Facility/Agency Request Status Selected? Address Phone Number Fax Number    [facility] [place] Pending - No Request Sent     [address] [facility] [id number] [phone]            Alycia Reed
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
## 2017-08-03 LAB
ALBUMIN SERPL-MCNC: 3.7 G/DL (ref 3.5–5)
ALBUMIN/GLOB SERPL: 0.8 G/DL (ref 1.5–2.5)
ALP SERPL-CCNC: 176 U/L (ref 40–129)
ALT SERPL W P-5'-P-CCNC: 31 U/L (ref 10–44)
ANION GAP SERPL CALCULATED.3IONS-SCNC: 6.4 MMOL/L (ref 3.6–11.2)
AST SERPL-CCNC: 21 U/L (ref 10–34)
BACTERIA SPEC AEROBE CULT: NORMAL
BASOPHILS # BLD AUTO: 0.04 10*3/MM3 (ref 0–0.3)
BASOPHILS NFR BLD AUTO: 0.3 % (ref 0–2)
BILIRUB SERPL-MCNC: 0.4 MG/DL (ref 0.2–1.8)
BUN BLD-MCNC: 10 MG/DL (ref 7–21)
BUN/CREAT SERPL: 17.2 (ref 7–25)
CALCIUM SPEC-SCNC: 9.4 MG/DL (ref 7.7–10)
CHLORIDE SERPL-SCNC: 106 MMOL/L (ref 99–112)
CO2 SERPL-SCNC: 24.6 MMOL/L (ref 24.3–31.9)
CREAT BLD-MCNC: 0.58 MG/DL (ref 0.43–1.29)
CRP SERPL-MCNC: 12.67 MG/DL (ref 0–0.99)
DEPRECATED RDW RBC AUTO: 44.6 FL (ref 37–54)
EOSINOPHIL # BLD AUTO: 0.96 10*3/MM3 (ref 0–0.7)
EOSINOPHIL NFR BLD AUTO: 6.4 % (ref 0–5)
ERYTHROCYTE [DISTWIDTH] IN BLOOD BY AUTOMATED COUNT: 14.2 % (ref 11.5–14.5)
GFR SERPL CREATININE-BSD FRML MDRD: >150 ML/MIN/1.73
GLOBULIN UR ELPH-MCNC: 4.7 GM/DL
GLUCOSE BLD-MCNC: 104 MG/DL (ref 70–110)
HCT VFR BLD AUTO: 31.4 % (ref 42–52)
HGB BLD-MCNC: 9.8 G/DL (ref 14–18)
IMM GRANULOCYTES # BLD: 0.35 10*3/MM3 (ref 0–0.03)
IMM GRANULOCYTES NFR BLD: 2.3 % (ref 0–0.5)
LYMPHOCYTES # BLD AUTO: 2.77 10*3/MM3 (ref 1–3)
LYMPHOCYTES NFR BLD AUTO: 18.4 % (ref 21–51)
MAGNESIUM SERPL-MCNC: 2.1 MG/DL (ref 1.7–2.6)
MCH RBC QN AUTO: 27.1 PG (ref 27–33)
MCHC RBC AUTO-ENTMCNC: 31.2 G/DL (ref 33–37)
MCV RBC AUTO: 86.7 FL (ref 80–94)
MONOCYTES # BLD AUTO: 0.63 10*3/MM3 (ref 0.1–0.9)
MONOCYTES NFR BLD AUTO: 4.2 % (ref 0–10)
NEUTROPHILS # BLD AUTO: 10.27 10*3/MM3 (ref 1.4–6.5)
NEUTROPHILS NFR BLD AUTO: 68.4 % (ref 30–70)
NRBC BLD MANUAL-RTO: 0 /100 WBC (ref 0–0)
OSMOLALITY SERPL CALC.SUM OF ELEC: 273.2 MOSM/KG (ref 273–305)
PHOSPHATE SERPL-MCNC: 5.4 MG/DL (ref 2.7–4.5)
PLATELET # BLD AUTO: 562 10*3/MM3 (ref 130–400)
PMV BLD AUTO: 9.7 FL (ref 6–10)
POTASSIUM BLD-SCNC: 3.7 MMOL/L (ref 3.5–5.3)
PROT FLD-MCNC: <0.2 G/DL
PROT SERPL-MCNC: 8.4 G/DL (ref 6–8)
RBC # BLD AUTO: 3.62 10*6/MM3 (ref 4.7–6.1)
SODIUM BLD-SCNC: 137 MMOL/L (ref 135–153)
WBC NRBC COR # BLD: 15.02 10*3/MM3 (ref 4.5–12.5)

## 2017-08-03 PROCEDURE — 94799 UNLISTED PULMONARY SVC/PX: CPT

## 2017-08-03 PROCEDURE — 83735 ASSAY OF MAGNESIUM: CPT | Performed by: INTERNAL MEDICINE

## 2017-08-03 PROCEDURE — 25010000002 LEVOFLOXACIN PER 250 MG: Performed by: INTERNAL MEDICINE

## 2017-08-03 PROCEDURE — 85025 COMPLETE CBC W/AUTO DIFF WBC: CPT | Performed by: INTERNAL MEDICINE

## 2017-08-03 PROCEDURE — 25010000002 HEPARIN (PORCINE) PER 1000 UNITS: Performed by: INTERNAL MEDICINE

## 2017-08-03 PROCEDURE — 80053 COMPREHEN METABOLIC PANEL: CPT | Performed by: INTERNAL MEDICINE

## 2017-08-03 PROCEDURE — 25010000002 VANCOMYCIN PER 500 MG

## 2017-08-03 PROCEDURE — 99232 SBSQ HOSP IP/OBS MODERATE 35: CPT | Performed by: INTERNAL MEDICINE

## 2017-08-03 PROCEDURE — 84100 ASSAY OF PHOSPHORUS: CPT | Performed by: INTERNAL MEDICINE

## 2017-08-03 PROCEDURE — 94660 CPAP INITIATION&MGMT: CPT

## 2017-08-03 PROCEDURE — 86140 C-REACTIVE PROTEIN: CPT | Performed by: INTERNAL MEDICINE

## 2017-08-03 RX ORDER — SUCRALFATE ORAL 1 G/10ML
1 SUSPENSION ORAL
Status: DISCONTINUED | OUTPATIENT
Start: 2017-08-03 | End: 2017-08-04 | Stop reason: HOSPADM

## 2017-08-03 RX ADMIN — LEVOFLOXACIN 500 MG: 5 INJECTION, SOLUTION INTRAVENOUS at 08:17

## 2017-08-03 RX ADMIN — SODIUM HYPOCHLORITE: 2.5 SOLUTION TOPICAL at 08:20

## 2017-08-03 RX ADMIN — Medication 1 TABLET: at 08:20

## 2017-08-03 RX ADMIN — PANTOPRAZOLE SODIUM 40 MG: 40 TABLET, DELAYED RELEASE ORAL at 05:41

## 2017-08-03 RX ADMIN — NYSTATIN: 100000 POWDER TOPICAL at 21:04

## 2017-08-03 RX ADMIN — SODIUM HYPOCHLORITE: 2.5 SOLUTION TOPICAL at 21:04

## 2017-08-03 RX ADMIN — SUCRALFATE 1 G: 1 SUSPENSION ORAL at 21:04

## 2017-08-03 RX ADMIN — GABAPENTIN 400 MG: 400 CAPSULE ORAL at 21:04

## 2017-08-03 RX ADMIN — TOPIRAMATE 100 MG: 100 TABLET, FILM COATED ORAL at 16:09

## 2017-08-03 RX ADMIN — CASTOR OIL AND BALSAM, PERU: 788; 87 OINTMENT TOPICAL at 08:20

## 2017-08-03 RX ADMIN — BACLOFEN 30 MG: 10 TABLET ORAL at 23:48

## 2017-08-03 RX ADMIN — SUCRALFATE 1 G: 1 TABLET ORAL at 08:20

## 2017-08-03 RX ADMIN — SUCRALFATE 1 G: 1 TABLET ORAL at 11:30

## 2017-08-03 RX ADMIN — HEPARIN SODIUM 5000 UNITS: 5000 INJECTION, SOLUTION INTRAVENOUS; SUBCUTANEOUS at 05:41

## 2017-08-03 RX ADMIN — DULOXETINE HYDROCHLORIDE 60 MG: 60 CAPSULE, DELAYED RELEASE ORAL at 17:09

## 2017-08-03 RX ADMIN — BACLOFEN 30 MG: 10 TABLET ORAL at 05:42

## 2017-08-03 RX ADMIN — VANCOMYCIN HYDROCHLORIDE 2000 MG: 5 INJECTION, POWDER, LYOPHILIZED, FOR SOLUTION INTRAVENOUS at 00:45

## 2017-08-03 RX ADMIN — VANCOMYCIN HYDROCHLORIDE 2000 MG: 5 INJECTION, POWDER, LYOPHILIZED, FOR SOLUTION INTRAVENOUS at 11:30

## 2017-08-03 RX ADMIN — MODAFINIL 200 MG: 100 TABLET ORAL at 08:20

## 2017-08-03 RX ADMIN — TOPIRAMATE 100 MG: 100 TABLET, FILM COATED ORAL at 21:04

## 2017-08-03 RX ADMIN — TOPIRAMATE 100 MG: 100 TABLET, FILM COATED ORAL at 08:20

## 2017-08-03 RX ADMIN — DULOXETINE HYDROCHLORIDE 60 MG: 60 CAPSULE, DELAYED RELEASE ORAL at 08:21

## 2017-08-03 RX ADMIN — HEPARIN SODIUM 5000 UNITS: 5000 INJECTION, SOLUTION INTRAVENOUS; SUBCUTANEOUS at 21:04

## 2017-08-03 RX ADMIN — Medication 250 MG: at 17:09

## 2017-08-03 RX ADMIN — GABAPENTIN 400 MG: 400 CAPSULE ORAL at 13:46

## 2017-08-03 RX ADMIN — VANCOMYCIN HYDROCHLORIDE 2000 MG: 5 INJECTION, POWDER, LYOPHILIZED, FOR SOLUTION INTRAVENOUS at 23:50

## 2017-08-03 RX ADMIN — HEPARIN SODIUM 5000 UNITS: 5000 INJECTION, SOLUTION INTRAVENOUS; SUBCUTANEOUS at 13:46

## 2017-08-03 RX ADMIN — CASTOR OIL AND BALSAM, PERU: 788; 87 OINTMENT TOPICAL at 21:04

## 2017-08-03 RX ADMIN — CHOLECALCIFEROL CAP 125 MCG (5000 UNIT) 5000 UNITS: 125 CAP at 08:20

## 2017-08-03 RX ADMIN — TRAZODONE HYDROCHLORIDE 50 MG: 50 TABLET ORAL at 23:48

## 2017-08-03 RX ADMIN — SUCRALFATE 1 G: 1 SUSPENSION ORAL at 17:16

## 2017-08-03 RX ADMIN — NYSTATIN: 100000 POWDER TOPICAL at 08:20

## 2017-08-03 RX ADMIN — METHENAMINE HIPPURATE 1 G: 1 TABLET ORAL at 21:05

## 2017-08-03 RX ADMIN — GABAPENTIN 400 MG: 400 CAPSULE ORAL at 05:41

## 2017-08-03 RX ADMIN — Medication 250 MG: at 08:20

## 2017-08-03 RX ADMIN — METHENAMINE HIPPURATE 1 G: 1 TABLET ORAL at 08:22

## 2017-08-03 NOTE — PLAN OF CARE
Problem: Skin Integrity Impairment, Risk/Actual (Adult)  Goal: Identify Related Risk Factors and Signs and Symptoms  Outcome: Ongoing (interventions implemented as appropriate)  Goal: Skin Integrity/Wound Healing  Outcome: Ongoing (interventions implemented as appropriate)    Problem: Pressure Ulcer (Adult)  Goal: Signs and Symptoms of Listed Potential Problems Will be Absent or Manageable (Pressure Ulcer)  Outcome: Ongoing (interventions implemented as appropriate)

## 2017-08-03 NOTE — PLAN OF CARE
Problem: Skin Integrity Impairment, Risk/Actual (Adult)  Goal: Identify Related Risk Factors and Signs and Symptoms  Outcome: Ongoing (interventions implemented as appropriate)    07/31/17 0245   Skin Integrity Impairment, Risk/Actual   Skin Integrity Impairment, Risk/Actual: Related Risk Factors immobility;sensory impairment   Signs and Symptoms (Skin Integrity Impairment) edema       Goal: Skin Integrity/Wound Healing  Outcome: Ongoing (interventions implemented as appropriate)    08/03/17 0234   Skin Integrity Impairment, Risk/Actual (Adult)   Skin Integrity/Wound Healing making progress toward outcome         Problem: Pressure Ulcer (Adult)  Goal: Signs and Symptoms of Listed Potential Problems Will be Absent or Manageable (Pressure Ulcer)  Outcome: Ongoing (interventions implemented as appropriate)    08/03/17 0234   Pressure Ulcer   Problems Assessed (Pressure Ulcer) all   Problems Present (Pressure Ulcer) wound complications         Problem: Pressure Ulcer Risk (Alejandro Scale) (Adult,Obstetrics,Pediatric)  Goal: Identify Related Risk Factors and Signs and Symptoms  Outcome: Ongoing (interventions implemented as appropriate)    07/31/17 0245   Pressure Ulcer Risk (Alejandro Scale)   Related Risk Factors (Pressure Ulcer Risk (Alejandro Scale)) body weight extremes;mobility impaired       Goal: Skin Integrity  Outcome: Ongoing (interventions implemented as appropriate)    08/03/17 0234   Pressure Ulcer Risk (Alejandro Scale) (Adult,Obstetrics,Pediatric)   Skin Integrity making progress toward outcome
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
## 2017-08-03 NOTE — PROGRESS NOTES
"  I have personally seen and examined the patient today and discussed overnight interval progress and pertinent issues with nursing staff.    Subjective    More awake and alert this morning.  He has no complaints and seems to be comfortable.  No fever or diarrhea reported.       Past Medical History:   Diagnosis Date   • Asthma    • Cancer     skin cancer on right arm   • Diabetes mellitus    • History of transfusion    • Hyperlipidemia    • Hypertension    • Paraplegia    • Sleep apnea        History reviewed. No pertinent family history.    Social History     Social History   • Marital status:      Spouse name: N/A   • Number of children: N/A   • Years of education: N/A     Occupational History   • Not on file.     Social History Main Topics   • Smoking status: Never Smoker   • Smokeless tobacco: Not on file   • Alcohol use Yes      Comment: occassional    • Drug use: Yes     Special: Marijuana   • Sexual activity: Defer     Other Topics Concern   • Not on file     Social History Narrative         ALLERGIES:    Allergies   Allergen Reactions   • Keflex [Cephalexin] Rash     Patient states \"red man syndrome\"         History taken from: patient chart RN      Vital Signs    /66 (BP Location: Right arm)  Pulse 78  Temp 98 °F (36.7 °C) (Oral)   Resp 20  Ht 71\" (180.3 cm)  Wt 269 lb 2 oz (122 kg)  SpO2 95%  BMI 37.54 kg/m2    Temp:  [98 °F (36.7 °C)-98.8 °F (37.1 °C)] 98 °F (36.7 °C)      Intake/Output Summary (Last 24 hours) at 08/03/17 1044  Last data filed at 08/03/17 0811   Gross per 24 hour   Intake             2300 ml   Output             3300 ml   Net            -1000 ml     Intake & Output (last 3 days)       07/31 0701 - 08/01 0700 08/01 0701 - 08/02 0700 08/02 0701 - 08/03 0700 08/03 0701 - 08/04 0700    P.O. 570 1440 1800 360    IV Piggyback 500 600 500     Irrigation 1       Total Intake(mL/kg) 1071 (8.8) 2040 (16.7) 2300 (18.8) 360 (2.9)    Urine (mL/kg/hr) 4300 (1.5) 4675 (1.6) 3100 " (1.1) 450 (1)    Stool 1100 (0.4) 500 (0.2) 125 (0) 500 (1.1)    Total Output 5400 2936 1944 249    Net -4329 -3135 -925 -590                Physical Exam:       General Appearance:    More awake and alert cooperative, in no acute distress   Head:    Normocephalic, without obvious abnormality, atraumatic   Eyes:            Lids and lashes normal, conjunctivae and sclerae normal, no   icterus, no pallor, corneas clear, PERRLA   Ears:    Ears appear intact with no abnormalities noted   Throat:   No oral lesions, no thrush, oral mucosa moist   Neck:   No adenopathy, supple, trachea midline, no thyromegaly, no   carotid bruit, no JVD   Back:     No tenderness to percussion or palpation, range of motion   normal   Lungs:     Clear to auscultation,respirations regular, even and unlabored. No wheezing, no ronchi and no crackles.    Heart:    Regular rhythm and normal rate, normal S1 and S2, no            murmur, no gallop, no rub, no click   Chest Wall:    No abnormalities observed   Abdomen:     Positive for colostomy and urostomy Normal bowel sounds, no masses, no organomegaly, soft, non-tender, non-distended, no guarding, no rebound                tenderness   Rectal:     Deferred   Extremities:  Paraplegia    Pulses:   Pulses palpable and equal bilaterally   Skin:   Right buttocks ulcer with wound VAC in place and surrounding erythema as well as warmth.     Lymph nodes:   No palpable adenopathy   Neurologic:   Awake and alert. Following commands.Paraplegia              Results:      Results from last 7 days  Lab Units 08/03/17  0101 08/02/17  0155 08/01/17  0058 07/31/17  0447 07/30/17  0111 07/28/17  2314   WBC 10*3/mm3 15.02* 14.46* 13.54* 10.75 12.46 24.12*     Lab Results   Component Value Date    NEUTROABS 10.27 (H) 08/03/2017         Results from last 7 days  Lab Units 08/03/17  0101   CREATININE mg/dL 0.58         Results from last 7 days  Lab Units 08/03/17  0101 08/02/17  0155 08/01/17  0058   CRP mg/dL 12.67*  14.08* 14.28*       Results Review:    I have personally reviewed laboratory data, culture results, radiology studies and antimicrobial therapy.    Hospital Medications (active)       Dose Frequency Start End    acetaminophen (TYLENOL) tablet 650 mg 650 mg Every 6 Hours PRN 7/29/2017     Sig - Route: Take 2 tablets by mouth Every 6 (Six) Hours As Needed for Mild Pain (1-3). - Oral    acetaminophen (TYLENOL) tablet 650 mg 650 mg Every 6 Hours PRN 7/29/2017     Sig - Route: Take 2 tablets by mouth Every 6 (Six) Hours As Needed for Mild Pain (1-3). - Oral    baclofen (LIORESAL) tablet 30 mg 30 mg Every 8 Hours PRN 7/29/2017     Sig - Route: Take 3 tablets by mouth Every 8 (Eight) Hours As Needed for Muscle Spasms. - Oral    castor oil-balsam peru (VENELEX) ointment  Every 12 Hours Scheduled 7/29/2017     Sig - Route: Apply  topically Every 12 (Twelve) Hours. - Topical    Cosign for Ordering: Required by Carlin Landers MD    dextrose (D50W) solution 25 g 25 g Every 15 Minutes PRN 7/29/2017     Sig - Route: Infuse 50 mL into a venous catheter Every 15 (Fifteen) Minutes As Needed for Low Blood Sugar (Blood Sugar Less Than 70, Patient Has IV Access - Unresponsive, NPO or Unable To Safely Swallow). - Intravenous    dextrose (D50W) solution 25 g 25 g Every 15 Minutes PRN 7/29/2017     Sig - Route: Infuse 50 mL into a venous catheter Every 15 (Fifteen) Minutes As Needed for Low Blood Sugar (Blood Sugar Less Than 70, Patient Has IV Access - Unresponsive, NPO or Unable To Safely Swallow). - Intravenous    dextrose (GLUTOSE) oral gel 15 g 15 g Every 15 Minutes PRN 7/29/2017     Sig - Route: Take 15 g by mouth Every 15 (Fifteen) Minutes As Needed for Low Blood Sugar (Blood Sugar Less Than 70, Patient Alert, Is Not NPO & Can Safely Swallow). - Oral    dextrose (GLUTOSE) oral gel 15 g 15 g Every 15 Minutes PRN 7/29/2017     Sig - Route: Take 15 g by mouth Every 15 (Fifteen) Minutes As Needed for Low Blood Sugar (Blood Sugar  Less Than 70, Patient Alert, Is Not NPO & Can Safely Swallow). - Oral    DULoxetine (CYMBALTA) DR capsule 60 mg 60 mg 2 Times Daily 7/29/2017     Sig - Route: Take 1 capsule by mouth 2 (Two) Times a Day. - Oral    gabapentin (NEURONTIN) capsule 400 mg 400 mg Every 8 Hours Scheduled 7/29/2017     Sig - Route: Take 1 capsule by mouth Every 8 (Eight) Hours. - Oral    glucagon (human recombinant) (GLUCAGEN DIAGNOSTIC) injection 1 mg 1 mg Every 15 Minutes PRN 7/29/2017     Sig - Route: Inject 1 mg under the skin Every 15 (Fifteen) Minutes As Needed (Blood Glucose Less Than 70 - Patient Without IV Access - Unresponsive, NPO or Unable To Safely Swallow). - Subcutaneous    glucagon (human recombinant) (GLUCAGEN DIAGNOSTIC) injection 1 mg 1 mg Every 15 Minutes PRN 7/29/2017     Sig - Route: Inject 1 mg under the skin Every 15 (Fifteen) Minutes As Needed (Blood Glucose Less Than 70 - Patient Without IV Access - Unresponsive, NPO or Unable To Safely Swallow). - Subcutaneous    heparin (porcine) 5000 UNIT/ML injection 5,000 Units 5,000 Units Every 8 Hours Scheduled 7/30/2017     Sig - Route: Inject 1 mL under the skin Every 8 (Eight) Hours. - Subcutaneous    ibuprofen (ADVIL,MOTRIN) tablet 600 mg 600 mg 3 Times Daily PRN 7/29/2017     Sig - Route: Take 1 tablet by mouth 3 (Three) Times a Day As Needed for Mild Pain (1-3). - Oral    insulin aspart (novoLOG) injection 0-7 Units 0-7 Units 4 Times Daily Before Meals & Nightly 7/29/2017     Sig - Route: Inject 0-7 Units under the skin 4 (Four) Times a Day Before Meals & at Bedtime. - Subcutaneous    levoFLOXacin (LEVAQUIN) 500 mg/100 mL D5W (premix) (LEVAQUIN) 500 mg 500 mg Every 24 Hours 7/31/2017     Sig - Route: Infuse 100 mL into a venous catheter Daily. - Intravenous    Magnesium Sulfate 2 gram Bolus, followed by 8 gram infusion (total Mg dose 10 grams)- Mg less than or equal to 1mg/dL 2 g As Needed 7/29/2017     Sig - Route: Infuse 50 mL into a venous catheter As Needed (Mg  "less than or equal to 1mg/dL). - Intravenous    Linked Group 1:  \"Or\" Linked Group Details        magnesium sulfate 4 gram infusion- Mg 1.6-1.9 mg/dL 4 g As Needed 7/29/2017     Sig - Route: Infuse 100 mL into a venous catheter As Needed (Mg 1.6-1.9 mg/dL). - Intravenous    Linked Group 1:  \"Or\" Linked Group Details        magnesium sulfate 4 gram infusion- Mg 1.6-1.9 mg/dL 4 g Once 7/31/2017     Sig - Route: Infuse 100 mL into a venous catheter 1 (One) Time. - Intravenous    Magnesium Sulfate 6 gram Infusion (2 gm x 3) -Mg 1.1 -1.5 mg/dL 2 g As Needed 7/29/2017     Sig - Route: Infuse 50 mL into a venous catheter As Needed (Mg 1.1 -1.5 mg/dL). - Intravenous    Linked Group 1:  \"Or\" Linked Group Details        menthol-zinc oxide (CALMOSEPTINE) 0.44-20.625 % ointment  3 Times Daily PRN 7/29/2017     Sig - Route: Apply  topically 3 (Three) Times a Day As Needed (bed sores, crotch, and bottom of stomach). - Topical    methenamine (HIPREX) tablet 1 g 1 g Every 12 Hours Scheduled 7/29/2017     Sig - Route: Take 1 tablet by mouth Every 12 (Twelve) Hours. - Oral    modafinil (PROVIGIL) tablet 200 mg 200 mg Daily 7/29/2017     Sig - Route: Take 2 tablets by mouth Daily. - Oral    multivitamin (DAILY SARAH) tablet 1 tablet 1 tablet Daily 7/30/2017     Sig - Route: Take 1 tablet by mouth Daily. - Oral    nystatin (MYCOSTATIN) powder  Every 12 Hours Scheduled 7/30/2017     Sig - Route: Apply  topically Every 12 (Twelve) Hours. - Topical    pantoprazole (PROTONIX) EC tablet 40 mg 40 mg Every Early Morning 7/29/2017     Sig - Route: Take 1 tablet by mouth Every Morning. - Oral    potassium chloride (KLOR-CON) packet 40 mEq 40 mEq As Needed 7/29/2017     Sig - Route: Take 40 mEq by mouth As Needed (potassium replacement, see admin instructions). - Oral    Linked Group 2:  \"Or\" Linked Group Details        potassium chloride (MICRO-K) CR capsule 40 mEq 40 mEq As Needed 7/29/2017     Sig - Route: Take 4 capsules by mouth As Needed " "(potassium replacement.  see admin instructions). - Oral    Linked Group 2:  \"Or\" Linked Group Details        potassium chloride 10 mEq in 100 mL IVPB 10 mEq Every 1 Hour PRN 7/29/2017     Sig - Route: Infuse 100 mL into a venous catheter Every 1 (One) Hour As Needed (potassium protocol PERIPHERAL - see admin instructions). - Intravenous    Linked Group 2:  \"Or\" Linked Group Details        potassium chloride 10 mEq in 100 mL IVPB 10 mEq Every 1 Hour 7/31/2017 7/31/2017    Sig - Route: Infuse 100 mL into a venous catheter Every 1 (One) Hour. - Intravenous    potassium phosphate 15 mmol in sodium chloride 0.9 % 100 mL infusion 15 mmol As Needed 7/29/2017     Sig - Route: Infuse 15 mmol into a venous catheter As Needed (Peripheral IV - Phosphorus 1.25 - 2.1). - Intravenous    Linked Group 3:  \"Or\" Linked Group Details        potassium phosphate 30 mmol in sodium chloride 0.9 % 250 mL infusion 30 mmol As Needed 7/29/2017     Sig - Route: Infuse 30 mmol into a venous catheter As Needed (Peripheral IV - Phosphorus 0.9 - 1.24). - Intravenous    Linked Group 3:  \"Or\" Linked Group Details        potassium phosphate 45 mmol in sodium chloride 0.9 % 500 mL infusion 45 mmol As Needed 7/29/2017     Sig - Route: Infuse 45 mmol into a venous catheter As Needed (Peripheral IV - Phosphorus Less Than 0.9). - Intravenous    Linked Group 3:  \"Or\" Linked Group Details        promethazine (PHENERGAN) 12.5 mg in sodium chloride 0.9 % 50 mL 12.5 mg Every 6 Hours PRN 7/29/2017     Sig - Route: Infuse 12.5 mg into a venous catheter Every 6 (Six) Hours As Needed for Nausea or Vomiting. - Intravenous    Linked Group 4:  \"Or\" Linked Group Details        promethazine (PHENERGAN) tablet 25 mg 25 mg Every 6 Hours PRN 7/29/2017     Sig - Route: Take 1 tablet by mouth Every 6 (Six) Hours As Needed for Nausea or Vomiting. - Oral    Linked Group 4:  \"Or\" Linked Group Details        sodium chloride 0.9 % flush 1-10 mL 1-10 mL As Needed 7/29/2017     " "Sig - Route: Infuse 1-10 mL into a venous catheter As Needed for Line Care. - Intravenous    sodium chloride 0.9 % flush 10 mL 10 mL As Needed 7/28/2017     Sig - Route: Infuse 10 mL into a venous catheter As Needed for Line Care. - Intravenous    Cosign for Ordering: Accepted by Gema Matta MD on 7/29/2017 12:09 AM    Linked Group 5:  \"And\" Linked Group Details        sodium chloride 0.9 % infusion 75 mL/hr Continuous 7/29/2017     Sig - Route: Infuse 75 mL/hr into a venous catheter Continuous. - Intravenous    sodium hypochlorite (DAKIN'S) topical solution 0.25% (half strength)  2 Times Daily 7/29/2017     Sig - Route: Irrigate with  as directed 2 (Two) Times a Day. - Irrigation    Cosign for Ordering: Accepted by Sung Blair MD on 7/30/2017  9:37 AM    sodium phosphates 15 mmol in sodium chloride 0.9 % 250 mL IVPB 15 mmol As Needed 7/29/2017     Sig - Route: Infuse 15 mmol into a venous catheter As Needed (Peripheral IV - Phosphorus 1.25 - 2.1 & Potassium Greater Than 4). - Intravenous    Linked Group 3:  \"Or\" Linked Group Details        sodium phosphates 30 mmol in sodium chloride 0.9 % 250 mL IVPB 30 mmol As Needed 7/29/2017     Sig - Route: Infuse 30 mmol into a venous catheter As Needed (Peripheral IV - Phosphorus 0.9 - 1.24 & Potassium Greater Than 4). - Intravenous    Linked Group 3:  \"Or\" Linked Group Details        sodium phosphates 45 mmol in sodium chloride 0.9 % 250 mL IVPB 45 mmol As Needed 7/29/2017     Sig - Route: Infuse 45 mmol into a venous catheter As Needed (Peripheral IV - Phosphorus Less Than 0.9 & Potassium Greater Than 4). - Intravenous    Linked Group 3:  \"Or\" Linked Group Details        sucralfate (CARAFATE) tablet 1 g 1 g 4 Times Daily 7/29/2017     Sig - Route: Take 1 tablet by mouth 4 (Four) Times a Day. - Oral    topiramate (TOPAMAX) tablet 100 mg 100 mg 3 Times Daily 7/29/2017     Sig - Route: Take 1 tablet by mouth 3 (Three) Times a Day. - Oral    traZODone " (DESYREL) tablet 50 mg 50 mg Nightly PRN 7/29/2017     Sig - Route: Take 1 tablet by mouth At Night As Needed for Sleep. - Oral    vancomycin (VANCOCIN) 2,000 mg in sodium chloride 0.9 % 500 mL IVPB 2,000 mg Every 12 Hours 7/29/2017     Sig - Route: Infuse 2,000 mg into a venous catheter Every 12 (Twelve) Hours. - Intravenous    vitamin C (ASCORBIC ACID) tablet 250 mg 250 mg 2 Times Daily 7/29/2017     Sig - Route: Take 0.5 tablets by mouth 2 (Two) Times a Day. - Oral    vitamin D3 capsule 5,000 Units 5,000 Units Daily 7/29/2017     Sig - Route: Take 1 capsule by mouth Daily. - Oral    meropenem (MERREM) 1 g/100 mL 0.9% NS VTB (mbp) (Discontinued) 1 g Every 8 Hours 7/29/2017 7/31/2017    Sig - Route: Infuse 100 mL into a venous catheter Every 8 (Eight) Hours. - Intravenous            Cultures:    Blood Culture   Date Value Ref Range Status   07/29/2017 No growth at 24 hours  Preliminary   07/29/2017 No growth at 24 hours  Preliminary   07/28/2017 Gram positive cocci, consistent with Staph spp (A)  Preliminary   07/28/2017 No growth at 2 days  Preliminary     Urine Culture   Date Value Ref Range Status   07/29/2017 >100,000 CFU/mL Escherichia coli (A)  Final     Wound Culture   Date Value Ref Range Status   07/29/2017 Culture in progress  Preliminary       PROBLEM LIST:    Patient Active Problem List   Diagnosis   • Infected decubitus ulcer   • Decubitus skin ulcer   • Sepsis       Assessment/Plan     ASSESSMENT:    1.  Severe sepsis  2.  Acute osteomyelitis  3.  UTI     PLAN:    More awake and alert this morning.  He has no complaints and seems to be comfortable.  No fever or diarrhea reported.  Would continue with previous recommendations to follow culture results.    Plan for discharge on Friday on a six week course of IV antibiotic therapy guided by culture results.  Case discussed with Dr. Aguilar and .    Based on MRI report showing fairly large effusion in the joint space of the right hip status  post aspiration orthopedic follow-up is appreciated and consult on 7/31/2017 states agreement with IV antibiotic therapy and I&D of gluteal wound.    UC finalized from 7/29/2017 as E Coli with wound culture and blood cultures still in progress. The patient seems to have multiple infectious disease issues including wound infection, bacteremia, UTI and for now coverage was changed from vancomycin and merrem to vancomycin and levaquin 500 mg IV q24 hour and pharmacy asked to check on drug to drug interactions or prolonged QTCs.    Patients findings and recommendations were discussed with patient and nursing staff    Code Status: Full Code       Susan Royal PA-C  08/03/17  10:44 AM

## 2017-08-04 VITALS
WEIGHT: 269.13 LBS | DIASTOLIC BLOOD PRESSURE: 65 MMHG | TEMPERATURE: 98.2 F | OXYGEN SATURATION: 96 % | BODY MASS INDEX: 37.68 KG/M2 | RESPIRATION RATE: 18 BRPM | HEIGHT: 71 IN | HEART RATE: 76 BPM | SYSTOLIC BLOOD PRESSURE: 119 MMHG

## 2017-08-04 LAB
ALBUMIN SERPL-MCNC: 3.6 G/DL (ref 3.5–5)
ALBUMIN/GLOB SERPL: 0.8 G/DL (ref 1.5–2.5)
ALP SERPL-CCNC: 152 U/L (ref 40–129)
ALT SERPL W P-5'-P-CCNC: 30 U/L (ref 10–44)
ANION GAP SERPL CALCULATED.3IONS-SCNC: 4 MMOL/L (ref 3.6–11.2)
AST SERPL-CCNC: 23 U/L (ref 10–34)
BASOPHILS # BLD AUTO: 0.03 10*3/MM3 (ref 0–0.3)
BASOPHILS NFR BLD AUTO: 0.2 % (ref 0–2)
BILIRUB SERPL-MCNC: 0.4 MG/DL (ref 0.2–1.8)
BUN BLD-MCNC: 13 MG/DL (ref 7–21)
BUN/CREAT SERPL: 21.7 (ref 7–25)
CALCIUM SPEC-SCNC: 9.4 MG/DL (ref 7.7–10)
CHLORIDE SERPL-SCNC: 108 MMOL/L (ref 99–112)
CO2 SERPL-SCNC: 24 MMOL/L (ref 24.3–31.9)
CREAT BLD-MCNC: 0.6 MG/DL (ref 0.43–1.29)
CRP SERPL-MCNC: 9.43 MG/DL (ref 0–0.99)
DEPRECATED RDW RBC AUTO: 46.2 FL (ref 37–54)
EOSINOPHIL # BLD AUTO: 0.78 10*3/MM3 (ref 0–0.7)
EOSINOPHIL NFR BLD AUTO: 5.6 % (ref 0–5)
ERYTHROCYTE [DISTWIDTH] IN BLOOD BY AUTOMATED COUNT: 14.4 % (ref 11.5–14.5)
GFR SERPL CREATININE-BSD FRML MDRD: 150 ML/MIN/1.73
GLOBULIN UR ELPH-MCNC: 4.8 GM/DL
GLUCOSE BLD-MCNC: 101 MG/DL (ref 70–110)
HCT VFR BLD AUTO: 32.5 % (ref 42–52)
HGB BLD-MCNC: 9.9 G/DL (ref 14–18)
IMM GRANULOCYTES # BLD: 0.35 10*3/MM3 (ref 0–0.03)
IMM GRANULOCYTES NFR BLD: 2.5 % (ref 0–0.5)
LYMPHOCYTES # BLD AUTO: 3.27 10*3/MM3 (ref 1–3)
LYMPHOCYTES NFR BLD AUTO: 23.5 % (ref 21–51)
MCH RBC QN AUTO: 26.9 PG (ref 27–33)
MCHC RBC AUTO-ENTMCNC: 30.5 G/DL (ref 33–37)
MCV RBC AUTO: 88.3 FL (ref 80–94)
MONOCYTES # BLD AUTO: 0.67 10*3/MM3 (ref 0.1–0.9)
MONOCYTES NFR BLD AUTO: 4.8 % (ref 0–10)
NEUTROPHILS # BLD AUTO: 8.81 10*3/MM3 (ref 1.4–6.5)
NEUTROPHILS NFR BLD AUTO: 63.4 % (ref 30–70)
NRBC BLD MANUAL-RTO: 0.2 /100 WBC (ref 0–0)
OSMOLALITY SERPL CALC.SUM OF ELEC: 272.2 MOSM/KG (ref 273–305)
PLATELET # BLD AUTO: 606 10*3/MM3 (ref 130–400)
PMV BLD AUTO: 9.7 FL (ref 6–10)
POTASSIUM BLD-SCNC: 3.6 MMOL/L (ref 3.5–5.3)
PROT SERPL-MCNC: 8.4 G/DL (ref 6–8)
RBC # BLD AUTO: 3.68 10*6/MM3 (ref 4.7–6.1)
SODIUM BLD-SCNC: 136 MMOL/L (ref 135–153)
WBC NRBC COR # BLD: 13.91 10*3/MM3 (ref 4.5–12.5)

## 2017-08-04 PROCEDURE — C1751 CATH, INF, PER/CENT/MIDLINE: HCPCS

## 2017-08-04 PROCEDURE — 80053 COMPREHEN METABOLIC PANEL: CPT | Performed by: INTERNAL MEDICINE

## 2017-08-04 PROCEDURE — 93005 ELECTROCARDIOGRAM TRACING: CPT | Performed by: INTERNAL MEDICINE

## 2017-08-04 PROCEDURE — 94799 UNLISTED PULMONARY SVC/PX: CPT

## 2017-08-04 PROCEDURE — 05HB33Z INSERTION OF INFUSION DEVICE INTO RIGHT BASILIC VEIN, PERCUTANEOUS APPROACH: ICD-10-PCS | Performed by: INTERNAL MEDICINE

## 2017-08-04 PROCEDURE — 86140 C-REACTIVE PROTEIN: CPT | Performed by: INTERNAL MEDICINE

## 2017-08-04 PROCEDURE — 25010000002 VANCOMYCIN PER 500 MG

## 2017-08-04 PROCEDURE — 85025 COMPLETE CBC W/AUTO DIFF WBC: CPT | Performed by: INTERNAL MEDICINE

## 2017-08-04 PROCEDURE — 99239 HOSP IP/OBS DSCHRG MGMT >30: CPT | Performed by: INTERNAL MEDICINE

## 2017-08-04 PROCEDURE — 25010000002 HEPARIN (PORCINE) PER 1000 UNITS: Performed by: INTERNAL MEDICINE

## 2017-08-04 RX ORDER — SODIUM HYPOCHLORITE 2.5 MG/ML
SOLUTION TOPICAL EVERY 12 HOURS
Qty: 473 ML | Refills: 0 | Status: SHIPPED | OUTPATIENT
Start: 2017-08-04 | End: 2017-09-21

## 2017-08-04 RX ORDER — CASTOR OIL AND BALSAM, PERU 788; 87 MG/G; MG/G
1 OINTMENT TOPICAL EVERY 12 HOURS SCHEDULED
Start: 2017-08-04 | End: 2017-09-21

## 2017-08-04 RX ORDER — LEVOFLOXACIN 500 MG/1
500 TABLET, FILM COATED ORAL
Qty: 36 TABLET | Refills: 0 | Status: SHIPPED | OUTPATIENT
Start: 2017-08-04 | End: 2017-08-29 | Stop reason: ALTCHOICE

## 2017-08-04 RX ORDER — SODIUM CHLORIDE 0.9 % (FLUSH) 0.9 %
10 SYRINGE (ML) INJECTION EVERY 12 HOURS SCHEDULED
Status: DISCONTINUED | OUTPATIENT
Start: 2017-08-04 | End: 2017-08-04 | Stop reason: HOSPADM

## 2017-08-04 RX ORDER — SODIUM CHLORIDE 0.9 % (FLUSH) 0.9 %
10 SYRINGE (ML) INJECTION AS NEEDED
Status: DISCONTINUED | OUTPATIENT
Start: 2017-08-04 | End: 2017-08-04 | Stop reason: HOSPADM

## 2017-08-04 RX ORDER — NYSTATIN 100000 [USP'U]/G
POWDER TOPICAL EVERY 12 HOURS SCHEDULED
Qty: 15 G | Refills: 0 | Status: SHIPPED | OUTPATIENT
Start: 2017-08-04 | End: 2017-08-11

## 2017-08-04 RX ORDER — MULTIVITAMIN
1 TABLET ORAL DAILY
Qty: 30 TABLET | Refills: 0 | Status: ON HOLD | OUTPATIENT
Start: 2017-08-04 | End: 2017-12-05

## 2017-08-04 RX ORDER — LEVOFLOXACIN 500 MG/1
500 TABLET, FILM COATED ORAL
Status: DISCONTINUED | OUTPATIENT
Start: 2017-08-04 | End: 2017-08-04 | Stop reason: HOSPADM

## 2017-08-04 RX ADMIN — CHOLECALCIFEROL CAP 125 MCG (5000 UNIT) 5000 UNITS: 125 CAP at 09:58

## 2017-08-04 RX ADMIN — CASTOR OIL AND BALSAM, PERU: 788; 87 OINTMENT TOPICAL at 09:59

## 2017-08-04 RX ADMIN — SODIUM HYPOCHLORITE: 2.5 SOLUTION TOPICAL at 12:36

## 2017-08-04 RX ADMIN — PANTOPRAZOLE SODIUM 40 MG: 40 TABLET, DELAYED RELEASE ORAL at 05:25

## 2017-08-04 RX ADMIN — TOPIRAMATE 100 MG: 100 TABLET, FILM COATED ORAL at 09:58

## 2017-08-04 RX ADMIN — METHENAMINE HIPPURATE 1 G: 1 TABLET ORAL at 09:58

## 2017-08-04 RX ADMIN — LEVOFLOXACIN 500 MG: 500 TABLET, FILM COATED ORAL at 10:29

## 2017-08-04 RX ADMIN — GABAPENTIN 400 MG: 400 CAPSULE ORAL at 14:18

## 2017-08-04 RX ADMIN — DULOXETINE HYDROCHLORIDE 60 MG: 60 CAPSULE, DELAYED RELEASE ORAL at 09:58

## 2017-08-04 RX ADMIN — Medication 250 MG: at 09:57

## 2017-08-04 RX ADMIN — Medication 1 TABLET: at 09:57

## 2017-08-04 RX ADMIN — Medication 10 ML: at 10:30

## 2017-08-04 RX ADMIN — HEPARIN SODIUM 5000 UNITS: 5000 INJECTION, SOLUTION INTRAVENOUS; SUBCUTANEOUS at 05:25

## 2017-08-04 RX ADMIN — HEPARIN SODIUM 5000 UNITS: 5000 INJECTION, SOLUTION INTRAVENOUS; SUBCUTANEOUS at 14:18

## 2017-08-04 RX ADMIN — NYSTATIN: 100000 POWDER TOPICAL at 10:00

## 2017-08-04 RX ADMIN — SUCRALFATE 1 G: 1 SUSPENSION ORAL at 12:36

## 2017-08-04 RX ADMIN — MODAFINIL 200 MG: 100 TABLET ORAL at 09:56

## 2017-08-04 RX ADMIN — VANCOMYCIN HYDROCHLORIDE 2000 MG: 5 INJECTION, POWDER, LYOPHILIZED, FOR SOLUTION INTRAVENOUS at 11:00

## 2017-08-04 RX ADMIN — SUCRALFATE 1 G: 1 SUSPENSION ORAL at 09:56

## 2017-08-04 RX ADMIN — GABAPENTIN 400 MG: 400 CAPSULE ORAL at 05:25

## 2017-08-04 NOTE — DISCHARGE PLACEMENT REQUEST
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 following orders were created for panel order CBC & Differential.  Procedure                               Abnormality         Status                     ---------                               -----------         ------                     Scan Slide[[id number]]                                                                  CBC Auto Differential[[id number]]        Abnormal            Final result                 Please view results for these tests on the individual orders.    CBC Auto Differential [[id number]]  (Abnormal) Collected:  08/04/17 0058    Specimen:  Blood Updated:  08/04/17 0207     WBC 13.91 (H) 10*3/mm3      RBC 3.68 (L) 10*6/mm3      Hemoglobin 9.9 (L) g/dL      Hematocrit 32.5 (L) %      MCV 88.3 fL      MCH 26.9 (L) pg      MCHC 30.5 (L) g/dL      RDW 14.4 %      RDW-SD 46.2 fl      MPV 9.7 fL      Platelets 606 (H) 10*3/mm3      Neutrophil % 63.4 %      Lymphocyte % 23.5 %      Monocyte % 4.8 %      Eosinophil % 5.6 (H) %      Basophil % 0.2 %      Immature Grans % 2.5 (H) %      Neutrophils, Absolute 8.81 (H) 10*3/mm3      Lymphocytes, Absolute 3.27 (H) 10*3/mm3      Monocytes, Absolute 0.67 10*3/mm3      Eosinophils, Absolute 0.78 (H)  10*3/mm3      Basophils, Absolute 0.03 10*3/mm3      Immature Grans, Absolute 0.35 (H) 10*3/mm3      nRBC 0.2 (H) /100 WBC     Comprehensive Metabolic Panel [[id number]]  (Abnormal) Collected:  08/04/17 0058    Specimen:  Blood Updated:  08/04/17 0219     Glucose 101 mg/dL      BUN 13 mg/dL      Creatinine 0.60 mg/dL      Sodium 136 mmol/L      Potassium 3.6 mmol/L      Chloride 108 mmol/L      CO2 24.0 (L) mmol/L      Calcium 9.4 mg/dL      Total Protein 8.4 (H) g/dL      Albumin 3.60 g/dL      ALT (SGPT) 30 U/L      AST (SGOT) 23 U/L      Alkaline Phosphatase 152 (H) U/L       Note New Reference Ranges        Total Bilirubin 0.4 mg/dL      eGFR Non African Amer 150 mL/min/1.73      Globulin 4.8 gm/dL      A/G Ratio 0.8 (L) g/dL      BUN/Creatinine Ratio 21.7     Anion Gap 4.0 mmol/L     Osmolality, Calculated [[id number]]  (Abnormal) Collected:  08/04/17 0058    Specimen:  Blood Updated:  08/04/17 0219     Osmolality Calc 272.2 (L) mOsm/kg     C-reactive Protein [[id number]]  (Abnormal) Collected:  08/04/17 0058    Specimen:  Blood Updated:  08/04/17 0222     C-Reactive Protein 9.43 (H) mg/dL         Imaging Results (last 24 hours)     ** No results found for the last 24 hours. **        Orders (last 24 hrs)     Start     Ordered    08/04/17 1100  levoFLOXacin (LEVAQUIN) tablet 500 mg  Every 24 Hours Scheduled      08/04/17 0900    08/04/17 1030  sodium chloride 0.9 % flush 10 mL  Every 12 Hours Scheduled      08/04/17 0948    08/04/17 1030  sodium chloride 0.9 % flush 10 mL  Every 12 Hours Scheduled      08/04/17 0948    08/04/17 0949  Change Gauze & Transparent Dressing Daily  Daily      08/04/17 0948    08/04/17 0948  Connectors / Hubs Must Be Scrubbed 15 Seconds Using 70% Alcohol or 2% Chlorhexidine Before Access - Allow to Dry Before Accessing Line  Continuous      08/04/17 0948    08/04/17 0948  Change CHG Dressing or Transparent Dressing With CHG Disk Every 7 Days  Weekly      08/04/17 0948    08/04/17 0948   Change Needleless Connectors When Changing Dressing  Weekly      08/04/17 0948    08/04/17 0948  Change Needleless Connectors When Changing Dressing  Weekly      08/04/17 0948    08/04/17 0947  sodium chloride 0.9 % flush 10 mL  As Needed      08/04/17 0948    08/04/17 0947  sodium chloride 0.9 % flush 10 mL  As Needed      08/04/17 0948    08/04/17 0928  ECG 12 Lead  Once      08/04/17 0927    08/04/17 0918  Discharge Prescription Status  Continuous     Comments:  Meds to bed    08/04/17 0918    08/04/17 0916  Discharge patient  Once      08/04/17 0918    08/04/17 0916  Discontinue IV  Once      08/04/17 0918    08/04/17 0600  CBC & Differential  Morning Draw      08/03/17 1709    08/04/17 0600  Comprehensive Metabolic Panel  Morning Draw      08/03/17 1709    08/04/17 0600  C-reactive Protein  Morning Draw      08/03/17 1709    08/04/17 0600  CBC Auto Differential  PROCEDURE ONCE      08/04/17 0001    08/04/17 0220  Osmolality, Calculated  Once      08/04/17 0219    08/04/17 0158  Scan Slide  Once,   Status:  Canceled      08/04/17 0157    08/04/17 0000  levoFLOXacin (LEVAQUIN) 500 MG tablet  Every 24 Hours Scheduled      08/04/17 0918    08/04/17 0000  castor oil-balsam peru (VENELEX) ointment  Every 12 Hours Scheduled      08/04/17 0918    08/04/17 0000  nystatin (MYCOSTATIN) 847341 UNIT/GM powder  Every 12 Hours Scheduled      08/04/17 0918    08/04/17 0000  sodium hypochlorite (DAKIN'S) 0.2-0.25 % topical solution  Every 12 Hours      08/04/17 0918    08/04/17 0000  multivitamin (DAILY SARAH) tablet tablet  Daily      08/04/17 0918    08/04/17 0000  Discharge Follow-up with PCP      08/04/17 0918    08/04/17 0000  Discharge Follow-Up With Specified Provider      08/04/17 0918    08/04/17 0000  Activity as Tolerated      08/04/17 0918    08/04/17 0000  Diet: Regular, Consistent Carbohydrate; Thin Liquids, No Restrictions      08/04/17 0918    08/04/17 0000  vancomycin 2000 mg in sodium chloride 0.9% 500 mL IVPB   Every 12 Hours      08/04/17 0924    08/03/17 1800  sucralfate (CARAFATE) 1 GM/10ML suspension 1 g  4 Times Daily With Meals & Nightly      08/03/17 1551    08/03/17 1227  Inpatient Consult For PICC  Once     Comments:  After Line Placement, Flushes and Line Care Should Be Ordered Using the Line Care (Flushes & Dressing Changes) - All Line Types Order Set   Provider:  (Not yet assigned)    08/03/17 1227    07/31/17 2100  sodium hypochlorite (DAKIN'S) topical solution 0.25% (half strength)  Every 12 Hours      07/31/17 1607    07/31/17 1700  insulin aspart (novoLOG) injection 0-7 Units  As Needed      07/31/17 1648    07/31/17 0900  levoFLOXacin (LEVAQUIN) 500 mg/100 mL D5W (premix) (LEVAQUIN) 500 mg  Every 24 Hours,   Status:  Discontinued      07/31/17 0748    07/30/17 1400  heparin (porcine) 5000 UNIT/ML injection 5,000 Units  Every 8 Hours Scheduled      07/30/17 0830    07/30/17 0900  multivitamin (DAILY SARAH) tablet 1 tablet  Daily      07/30/17 0803    07/30/17 0100  nystatin (MYCOSTATIN) powder  Every 12 Hours Scheduled      07/30/17 0008    07/29/17 2100  traZODone (DESYREL) tablet 50 mg  Nightly PRN      07/29/17 0930    07/29/17 1400  gabapentin (NEURONTIN) capsule 400 mg  Every 8 Hours Scheduled      07/29/17 0947    07/29/17 1300  acetaminophen (TYLENOL) tablet 650 mg  Every 6 Hours PRN      07/29/17 1219    07/29/17 1219  acetaminophen (TYLENOL) tablet 650 mg  Every 6 Hours PRN      07/29/17 1219    07/29/17 1200  sucralfate (CARAFATE) tablet 1 g  4 Times Daily,   Status:  Discontinued      07/29/17 0856    07/29/17 1200  vancomycin (VANCOCIN) 2,000 mg in sodium chloride 0.9 % 500 mL IVPB  Every 12 Hours      07/29/17 0748    07/29/17 1200  castor oil-balsam peru (VENELEX) ointment  Every 12 Hours Scheduled      07/29/17 1059    07/29/17 1100  vitamin D3 capsule 5,000 Units  Daily      07/29/17 0856    07/29/17 1100  DULoxetine (CYMBALTA) DR capsule 60 mg  2 Times Daily      07/29/17 0856    07/29/17  1100  modafinil (PROVIGIL) tablet 200 mg  Daily      07/29/17 0856    07/29/17 1100  topiramate (TOPAMAX) tablet 100 mg  3 Times Daily      07/29/17 0856    07/29/17 1100  vitamin C (ASCORBIC ACID) tablet 250 mg  2 Times Daily      07/29/17 0856    07/29/17 1100  methenamine (HIPREX) tablet 1 g  Every 12 Hours Scheduled      07/29/17 0856    07/29/17 1100  pantoprazole (PROTONIX) EC tablet 40 mg  Every Early Morning      07/29/17 0856    07/29/17 1000  baclofen (LIORESAL) tablet 30 mg  Every 8 Hours PRN      07/29/17 0929    07/29/17 0902  dextrose (GLUTOSE) oral gel 15 g  Every 15 Minutes PRN      07/29/17 0902    07/29/17 0902  dextrose (D50W) solution 25 g  Every 15 Minutes PRN      07/29/17 0902    07/29/17 0902  glucagon (human recombinant) (GLUCAGEN DIAGNOSTIC) injection 1 mg  Every 15 Minutes PRN      07/29/17 0902    07/29/17 0855  ibuprofen (ADVIL,MOTRIN) tablet 600 mg  3 Times Daily PRN      07/29/17 0856    07/29/17 0855  menthol-zinc oxide (CALMOSEPTINE) 0.44-20.625 % ointment  3 Times Daily PRN      07/29/17 0856    07/29/17 0855  promethazine (PHENERGAN) tablet 25 mg  Every 6 Hours PRN      07/29/17 0855    07/29/17 0855  promethazine (PHENERGAN) 12.5 mg in sodium chloride 0.9 % 50 mL  Every 6 Hours PRN      07/29/17 0855    07/29/17 0853  potassium phosphate 45 mmol in sodium chloride 0.9 % 500 mL infusion  As Needed      07/29/17 0853    07/29/17 0853  potassium phosphate 30 mmol in sodium chloride 0.9 % 250 mL infusion  As Needed      07/29/17 0853    07/29/17 0853  potassium phosphate 15 mmol in sodium chloride 0.9 % 100 mL infusion  As Needed      07/29/17 0853    07/29/17 0853  sodium phosphates 45 mmol in sodium chloride 0.9 % 250 mL IVPB  As Needed      07/29/17 0853    07/29/17 0853  sodium phosphates 30 mmol in sodium chloride 0.9 % 250 mL IVPB  As Needed      07/29/17 0853    07/29/17 0853  sodium phosphates 15 mmol in sodium chloride 0.9 % 250 mL IVPB  As Needed      07/29/17 0853     07/29/17 0853  Magnesium Sulfate 2 gram Bolus, followed by 8 gram infusion (total Mg dose 10 grams)- Mg less than or equal to 1mg/dL  As Needed      07/29/17 0853    07/29/17 0853  Magnesium Sulfate 6 gram Infusion (2 gm x 3) -Mg 1.1 -1.5 mg/dL  As Needed      07/29/17 0853    07/29/17 0853  magnesium sulfate 4 gram infusion- Mg 1.6-1.9 mg/dL  As Needed      07/29/17 0853    07/29/17 0852  potassium chloride (MICRO-K) CR capsule 40 mEq  As Needed      07/29/17 0853    07/29/17 0852  potassium chloride (KLOR-CON) packet 40 mEq  As Needed      07/29/17 0853    07/29/17 0852  potassium chloride 10 mEq in 100 mL IVPB  Every 1 Hour PRN      07/29/17 0853    07/29/17 0745  sodium chloride 0.9 % infusion  Continuous,   Status:  Discontinued      07/29/17 0708    07/29/17 0708  sodium chloride 0.9 % flush 1-10 mL  As Needed      07/29/17 0708    07/29/17 0536  dextrose (GLUTOSE) oral gel 15 g  Every 15 Minutes PRN      07/29/17 0536    07/29/17 0536  dextrose (D50W) solution 25 g  Every 15 Minutes PRN      07/29/17 0536    07/29/17 0536  glucagon (human recombinant) (GLUCAGEN DIAGNOSTIC) injection 1 mg  Every 15 Minutes PRN      07/29/17 0536    07/28/17 2257  sodium chloride 0.9 % flush 10 mL  As Needed      07/28/17 2258    Unscheduled  POC Glucose Fingerstick  As Needed      07/31/17 1646    Unscheduled  Change Dressing to IV Site As Needed When Damp, Loose or Soiled  As Needed      08/04/17 0948    --  gabapentin (NEURONTIN) 400 MG capsule  2 Times Daily      07/29/17 0537    --  SCANNED - TELEMETRY        07/28/17 0000    --  SCANNED - TELEMETRY        07/28/17 0000    --  SCANNED - TELEMETRY        07/28/17 0000    --  SCANNED - TELEMETRY        07/28/17 0000          Operative/Procedure Notes (most recent note)     No notes of this type exist for this encounter.           Physician Progress Notes (most recent note)      Susan Royal PA-C at 8/4/2017  9:56 AM  Version 1 of 1           I have personally seen and  "examined the patient today and discussed overnight interval progress and pertinent issues with nursing staff.    Subjective    Patient is getting a PICC line today and possibly being discharged home soon.  Leukocytosis and CRP level is showing improvement.  Culture from right hip continues to show no growth.  Wound culture from 7/29/2017 finalized as MSSA, strep, and enterococcus.      Past Medical History:   Diagnosis Date   • Asthma    • Cancer     skin cancer on right arm   • Diabetes mellitus    • History of transfusion    • Hyperlipidemia    • Hypertension    • Paraplegia    • Sleep apnea        History reviewed. No pertinent family history.    Social History     Social History   • Marital status:      Spouse name: N/A   • Number of children: N/A   • Years of education: N/A     Occupational History   • Not on file.     Social History Main Topics   • Smoking status: Never Smoker   • Smokeless tobacco: Not on file   • Alcohol use Yes      Comment: occassional    • Drug use: Yes     Special: Marijuana   • Sexual activity: Defer     Other Topics Concern   • Not on file     Social History Narrative         ALLERGIES:    Allergies   Allergen Reactions   • Keflex [Cephalexin] Rash     Patient states \"red man syndrome\"         History taken from: patient chart RN      Vital Signs    /59 (BP Location: Right arm, Patient Position: Lying)  Pulse 58  Temp 97.9 °F (36.6 °C) (Oral)   Resp 18  Ht 71\" (180.3 cm)  Wt 269 lb 2 oz (122 kg)  SpO2 95%  BMI 37.54 kg/m2    Temp:  [97.9 °F (36.6 °C)-98.8 °F (37.1 °C)] 97.9 °F (36.6 °C)      Intake/Output Summary (Last 24 hours) at 08/04/17 0956  Last data filed at 08/04/17 0830   Gross per 24 hour   Intake             1940 ml   Output             3100 ml   Net            -1160 ml     Intake & Output (last 3 days)       08/01 0701 - 08/02 0700 08/02 0701 - 08/03 0700 08/03 0701 - 08/04 0700 08/04 0701 - 08/05 0700    P.O. 1440 1800 1440 360    IV Piggyback 600 500 " 500     Irrigation        Total Intake(mL/kg) 2040 (16.7) 2300 (18.8) 1940 (15.9) 360 (2.9)    Urine (mL/kg/hr) 4675 (1.6) 3100 (1.1) 3250 (1.1)     Stool 500 (0.2) 125 (0) 800 (0.3)     Total Output 5176 7022 4050      Net -3135 -925 -2110 +360                Physical Exam:       General Appearance:    More awake and alert cooperative, in no acute distress   Head:    Normocephalic, without obvious abnormality, atraumatic   Eyes:            Lids and lashes normal, conjunctivae and sclerae normal, no   icterus, no pallor, corneas clear, PERRLA   Ears:    Ears appear intact with no abnormalities noted   Throat:   No oral lesions, no thrush, oral mucosa moist   Neck:   No adenopathy, supple, trachea midline, no thyromegaly, no   carotid bruit, no JVD   Back:     No tenderness to percussion or palpation, range of motion   normal   Lungs:     Clear to auscultation,respirations regular, even and unlabored. No wheezing, no ronchi and no crackles.    Heart:    Regular rhythm and normal rate, normal S1 and S2, no            murmur, no gallop, no rub, no click   Chest Wall:    No abnormalities observed   Abdomen:     Positive for colostomy and urostomy Normal bowel sounds, no masses, no organomegaly, soft, non-tender, non-distended, no guarding, no rebound                tenderness   Rectal:     Deferred   Extremities:  Paraplegia    Pulses:   Pulses palpable and equal bilaterally   Skin:   Right buttocks ulcer with wound VAC in place and surrounding erythema as well as warmth.     Lymph nodes:   No palpable adenopathy   Neurologic:   Awake and alert. Following commands.Paraplegia              Results:      Results from last 7 days  Lab Units 08/04/17  0058 08/03/17  0101 08/02/17  0155 08/01/17  0058 07/31/17  0447 07/30/17  0111 07/28/17  2314   WBC 10*3/mm3 13.91* 15.02* 14.46* 13.54* 10.75 12.46 24.12*     Lab Results   Component Value Date    NEUTROABS 8.81 (H) 08/04/2017         Results from last 7 days  Lab Units  08/04/17  0058   CREATININE mg/dL 0.60         Results from last 7 days  Lab Units 08/04/17  0058 08/03/17  0101 08/02/17  0155   CRP mg/dL 9.43* 12.67* 14.08*       Results Review:    I have personally reviewed laboratory data, culture results, radiology studies and antimicrobial therapy.    Hospital Medications (active)       Dose Frequency Start End    acetaminophen (TYLENOL) tablet 650 mg 650 mg Every 6 Hours PRN 7/29/2017     Sig - Route: Take 2 tablets by mouth Every 6 (Six) Hours As Needed for Mild Pain (1-3). - Oral    acetaminophen (TYLENOL) tablet 650 mg 650 mg Every 6 Hours PRN 7/29/2017     Sig - Route: Take 2 tablets by mouth Every 6 (Six) Hours As Needed for Mild Pain (1-3). - Oral    baclofen (LIORESAL) tablet 30 mg 30 mg Every 8 Hours PRN 7/29/2017     Sig - Route: Take 3 tablets by mouth Every 8 (Eight) Hours As Needed for Muscle Spasms. - Oral    castor oil-balsam peru (VENELEX) ointment  Every 12 Hours Scheduled 7/29/2017     Sig - Route: Apply  topically Every 12 (Twelve) Hours. - Topical    Cosign for Ordering: Required by Carlin Landers MD    dextrose (D50W) solution 25 g 25 g Every 15 Minutes PRN 7/29/2017     Sig - Route: Infuse 50 mL into a venous catheter Every 15 (Fifteen) Minutes As Needed for Low Blood Sugar (Blood Sugar Less Than 70, Patient Has IV Access - Unresponsive, NPO or Unable To Safely Swallow). - Intravenous    dextrose (D50W) solution 25 g 25 g Every 15 Minutes PRN 7/29/2017     Sig - Route: Infuse 50 mL into a venous catheter Every 15 (Fifteen) Minutes As Needed for Low Blood Sugar (Blood Sugar Less Than 70, Patient Has IV Access - Unresponsive, NPO or Unable To Safely Swallow). - Intravenous    dextrose (GLUTOSE) oral gel 15 g 15 g Every 15 Minutes PRN 7/29/2017     Sig - Route: Take 15 g by mouth Every 15 (Fifteen) Minutes As Needed for Low Blood Sugar (Blood Sugar Less Than 70, Patient Alert, Is Not NPO & Can Safely Swallow). - Oral    dextrose (GLUTOSE) oral gel  15 g 15 g Every 15 Minutes PRN 7/29/2017     Sig - Route: Take 15 g by mouth Every 15 (Fifteen) Minutes As Needed for Low Blood Sugar (Blood Sugar Less Than 70, Patient Alert, Is Not NPO & Can Safely Swallow). - Oral    DULoxetine (CYMBALTA) DR capsule 60 mg 60 mg 2 Times Daily 7/29/2017     Sig - Route: Take 1 capsule by mouth 2 (Two) Times a Day. - Oral    gabapentin (NEURONTIN) capsule 400 mg 400 mg Every 8 Hours Scheduled 7/29/2017     Sig - Route: Take 1 capsule by mouth Every 8 (Eight) Hours. - Oral    glucagon (human recombinant) (GLUCAGEN DIAGNOSTIC) injection 1 mg 1 mg Every 15 Minutes PRN 7/29/2017     Sig - Route: Inject 1 mg under the skin Every 15 (Fifteen) Minutes As Needed (Blood Glucose Less Than 70 - Patient Without IV Access - Unresponsive, NPO or Unable To Safely Swallow). - Subcutaneous    glucagon (human recombinant) (GLUCAGEN DIAGNOSTIC) injection 1 mg 1 mg Every 15 Minutes PRN 7/29/2017     Sig - Route: Inject 1 mg under the skin Every 15 (Fifteen) Minutes As Needed (Blood Glucose Less Than 70 - Patient Without IV Access - Unresponsive, NPO or Unable To Safely Swallow). - Subcutaneous    heparin (porcine) 5000 UNIT/ML injection 5,000 Units 5,000 Units Every 8 Hours Scheduled 7/30/2017     Sig - Route: Inject 1 mL under the skin Every 8 (Eight) Hours. - Subcutaneous    ibuprofen (ADVIL,MOTRIN) tablet 600 mg 600 mg 3 Times Daily PRN 7/29/2017     Sig - Route: Take 1 tablet by mouth 3 (Three) Times a Day As Needed for Mild Pain (1-3). - Oral    insulin aspart (novoLOG) injection 0-7 Units 0-7 Units 4 Times Daily Before Meals & Nightly 7/29/2017     Sig - Route: Inject 0-7 Units under the skin 4 (Four) Times a Day Before Meals & at Bedtime. - Subcutaneous    levoFLOXacin (LEVAQUIN) 500 mg/100 mL D5W (premix) (LEVAQUIN) 500 mg 500 mg Every 24 Hours 7/31/2017     Sig - Route: Infuse 100 mL into a venous catheter Daily. - Intravenous    Magnesium Sulfate 2 gram Bolus, followed by 8 gram infusion  "(total Mg dose 10 grams)- Mg less than or equal to 1mg/dL 2 g As Needed 7/29/2017     Sig - Route: Infuse 50 mL into a venous catheter As Needed (Mg less than or equal to 1mg/dL). - Intravenous    Linked Group 1:  \"Or\" Linked Group Details        magnesium sulfate 4 gram infusion- Mg 1.6-1.9 mg/dL 4 g As Needed 7/29/2017     Sig - Route: Infuse 100 mL into a venous catheter As Needed (Mg 1.6-1.9 mg/dL). - Intravenous    Linked Group 1:  \"Or\" Linked Group Details        magnesium sulfate 4 gram infusion- Mg 1.6-1.9 mg/dL 4 g Once 7/31/2017     Sig - Route: Infuse 100 mL into a venous catheter 1 (One) Time. - Intravenous    Magnesium Sulfate 6 gram Infusion (2 gm x 3) -Mg 1.1 -1.5 mg/dL 2 g As Needed 7/29/2017     Sig - Route: Infuse 50 mL into a venous catheter As Needed (Mg 1.1 -1.5 mg/dL). - Intravenous    Linked Group 1:  \"Or\" Linked Group Details        menthol-zinc oxide (CALMOSEPTINE) 0.44-20.625 % ointment  3 Times Daily PRN 7/29/2017     Sig - Route: Apply  topically 3 (Three) Times a Day As Needed (bed sores, crotch, and bottom of stomach). - Topical    methenamine (HIPREX) tablet 1 g 1 g Every 12 Hours Scheduled 7/29/2017     Sig - Route: Take 1 tablet by mouth Every 12 (Twelve) Hours. - Oral    modafinil (PROVIGIL) tablet 200 mg 200 mg Daily 7/29/2017     Sig - Route: Take 2 tablets by mouth Daily. - Oral    multivitamin (DAILY SARAH) tablet 1 tablet 1 tablet Daily 7/30/2017     Sig - Route: Take 1 tablet by mouth Daily. - Oral    nystatin (MYCOSTATIN) powder  Every 12 Hours Scheduled 7/30/2017     Sig - Route: Apply  topically Every 12 (Twelve) Hours. - Topical    pantoprazole (PROTONIX) EC tablet 40 mg 40 mg Every Early Morning 7/29/2017     Sig - Route: Take 1 tablet by mouth Every Morning. - Oral    potassium chloride (KLOR-CON) packet 40 mEq 40 mEq As Needed 7/29/2017     Sig - Route: Take 40 mEq by mouth As Needed (potassium replacement, see admin instructions). - Oral    Linked Group 2:  \"Or\" Linked " "Group Details        potassium chloride (MICRO-K) CR capsule 40 mEq 40 mEq As Needed 7/29/2017     Sig - Route: Take 4 capsules by mouth As Needed (potassium replacement.  see admin instructions). - Oral    Linked Group 2:  \"Or\" Linked Group Details        potassium chloride 10 mEq in 100 mL IVPB 10 mEq Every 1 Hour PRN 7/29/2017     Sig - Route: Infuse 100 mL into a venous catheter Every 1 (One) Hour As Needed (potassium protocol PERIPHERAL - see admin instructions). - Intravenous    Linked Group 2:  \"Or\" Linked Group Details        potassium chloride 10 mEq in 100 mL IVPB 10 mEq Every 1 Hour 7/31/2017 7/31/2017    Sig - Route: Infuse 100 mL into a venous catheter Every 1 (One) Hour. - Intravenous    potassium phosphate 15 mmol in sodium chloride 0.9 % 100 mL infusion 15 mmol As Needed 7/29/2017     Sig - Route: Infuse 15 mmol into a venous catheter As Needed (Peripheral IV - Phosphorus 1.25 - 2.1). - Intravenous    Linked Group 3:  \"Or\" Linked Group Details        potassium phosphate 30 mmol in sodium chloride 0.9 % 250 mL infusion 30 mmol As Needed 7/29/2017     Sig - Route: Infuse 30 mmol into a venous catheter As Needed (Peripheral IV - Phosphorus 0.9 - 1.24). - Intravenous    Linked Group 3:  \"Or\" Linked Group Details        potassium phosphate 45 mmol in sodium chloride 0.9 % 500 mL infusion 45 mmol As Needed 7/29/2017     Sig - Route: Infuse 45 mmol into a venous catheter As Needed (Peripheral IV - Phosphorus Less Than 0.9). - Intravenous    Linked Group 3:  \"Or\" Linked Group Details        promethazine (PHENERGAN) 12.5 mg in sodium chloride 0.9 % 50 mL 12.5 mg Every 6 Hours PRN 7/29/2017     Sig - Route: Infuse 12.5 mg into a venous catheter Every 6 (Six) Hours As Needed for Nausea or Vomiting. - Intravenous    Linked Group 4:  \"Or\" Linked Group Details        promethazine (PHENERGAN) tablet 25 mg 25 mg Every 6 Hours PRN 7/29/2017     Sig - Route: Take 1 tablet by mouth Every 6 (Six) Hours As Needed for " "Nausea or Vomiting. - Oral    Linked Group 4:  \"Or\" Linked Group Details        sodium chloride 0.9 % flush 1-10 mL 1-10 mL As Needed 7/29/2017     Sig - Route: Infuse 1-10 mL into a venous catheter As Needed for Line Care. - Intravenous    sodium chloride 0.9 % flush 10 mL 10 mL As Needed 7/28/2017     Sig - Route: Infuse 10 mL into a venous catheter As Needed for Line Care. - Intravenous    Cosign for Ordering: Accepted by Gema Matta MD on 7/29/2017 12:09 AM    Linked Group 5:  \"And\" Linked Group Details        sodium chloride 0.9 % infusion 75 mL/hr Continuous 7/29/2017     Sig - Route: Infuse 75 mL/hr into a venous catheter Continuous. - Intravenous    sodium hypochlorite (DAKIN'S) topical solution 0.25% (half strength)  2 Times Daily 7/29/2017     Sig - Route: Irrigate with  as directed 2 (Two) Times a Day. - Irrigation    Cosign for Ordering: Accepted by Sung Blair MD on 7/30/2017  9:37 AM    sodium phosphates 15 mmol in sodium chloride 0.9 % 250 mL IVPB 15 mmol As Needed 7/29/2017     Sig - Route: Infuse 15 mmol into a venous catheter As Needed (Peripheral IV - Phosphorus 1.25 - 2.1 & Potassium Greater Than 4). - Intravenous    Linked Group 3:  \"Or\" Linked Group Details        sodium phosphates 30 mmol in sodium chloride 0.9 % 250 mL IVPB 30 mmol As Needed 7/29/2017     Sig - Route: Infuse 30 mmol into a venous catheter As Needed (Peripheral IV - Phosphorus 0.9 - 1.24 & Potassium Greater Than 4). - Intravenous    Linked Group 3:  \"Or\" Linked Group Details        sodium phosphates 45 mmol in sodium chloride 0.9 % 250 mL IVPB 45 mmol As Needed 7/29/2017     Sig - Route: Infuse 45 mmol into a venous catheter As Needed (Peripheral IV - Phosphorus Less Than 0.9 & Potassium Greater Than 4). - Intravenous    Linked Group 3:  \"Or\" Linked Group Details        sucralfate (CARAFATE) tablet 1 g 1 g 4 Times Daily 7/29/2017     Sig - Route: Take 1 tablet by mouth 4 (Four) Times a Day. - Oral    " topiramate (TOPAMAX) tablet 100 mg 100 mg 3 Times Daily 7/29/2017     Sig - Route: Take 1 tablet by mouth 3 (Three) Times a Day. - Oral    traZODone (DESYREL) tablet 50 mg 50 mg Nightly PRN 7/29/2017     Sig - Route: Take 1 tablet by mouth At Night As Needed for Sleep. - Oral    vancomycin (VANCOCIN) 2,000 mg in sodium chloride 0.9 % 500 mL IVPB 2,000 mg Every 12 Hours 7/29/2017     Sig - Route: Infuse 2,000 mg into a venous catheter Every 12 (Twelve) Hours. - Intravenous    vitamin C (ASCORBIC ACID) tablet 250 mg 250 mg 2 Times Daily 7/29/2017     Sig - Route: Take 0.5 tablets by mouth 2 (Two) Times a Day. - Oral    vitamin D3 capsule 5,000 Units 5,000 Units Daily 7/29/2017     Sig - Route: Take 1 capsule by mouth Daily. - Oral    meropenem (MERREM) 1 g/100 mL 0.9% NS VTB (mbp) (Discontinued) 1 g Every 8 Hours 7/29/2017 7/31/2017    Sig - Route: Infuse 100 mL into a venous catheter Every 8 (Eight) Hours. - Intravenous            Cultures:    Blood Culture   Date Value Ref Range Status   07/29/2017 No growth at 24 hours  Preliminary   07/29/2017 No growth at 24 hours  Preliminary   07/28/2017 Gram positive cocci, consistent with Staph spp (A)  Preliminary   07/28/2017 No growth at 2 days  Preliminary     Urine Culture   Date Value Ref Range Status   07/29/2017 >100,000 CFU/mL Escherichia coli (A)  Final     Wound Culture   Date Value Ref Range Status   07/29/2017 Culture in progress  Preliminary       PROBLEM LIST:    Patient Active Problem List   Diagnosis   • Infected decubitus ulcer   • Decubitus skin ulcer   • Sepsis       Assessment/Plan     ASSESSMENT:    1.  Severe sepsis  2.  Acute osteomyelitis  3.  UTI     PLAN:    Patient is getting a PICC line today and possibly being discharged home soon.  Leukocytosis and CRP level is showing improvement.  Culture from right hip continues to show no growth.  Wound culture from 7/29/2017 finalized as MSSA, strep, and enterococcus.  Could switch IV Levaquin to PO at any  time and recommend Levaquin and IV vancomycin to continue both through 9/8/2017 with very close orthopedic follow-up.    Patients findings and recommendations were discussed with patient and nursing staff    Code Status: Full Code       Susan Royal PA-C  08/04/17  9:56 AM           Electronically signed by Susan Royal PA-C at 8/4/2017  9:59 AM           Consult Notes (most recent note)      KAYLAH Javed at 7/31/2017  9:14 AM  Version 1 of 1     Consult Orders:    1. Inpatient Consult to Orthopedic Surgery [[id number]] ordered by Javier Moscoso DO at 07/30/17 1022                New Consult      Patient: Ken Krueger  YOB: 1975  Date of Encounter: 07/31/17            History of Present Illness:     41-year-old pleasant male seen today in consultation secondary to CT results with evidence of osteomyelitis ischial tuberosity. Apparently the patient presented with a subjective fever and mental status changes over the last several days. The family stated that he has had different types of infections in the past and has presented in similar fashion. Then heavily diaphoretic. Urine sample taken from urostomy bag revealed evidence of active bacteria growth. Patient continued to deteriorate and was brought to the emergency department via his family for further workup and management. Patient has a 3 month history of a complicated right gluteal ulcer which was debrided by general surgery. He has had wound VAC in place with slow healing per the family. Patient was initiated on broad-spectrum metabolic coverage at time of admission Patient has notable posterior hip and gluteal pain. He denies specific hip pain proper. Surgical history consistent with a left above-the-knee amputation         Patient Active Problem List   Diagnosis   • Infected decubitus ulcer   • Decubitus skin ulcer   • Sepsis     Past Medical History:   Diagnosis Date   • Asthma    • Cancer     skin cancer on right arm   •  Diabetes mellitus    • History of transfusion    • Hyperlipidemia    • Hypertension    • Paraplegia    • Sleep apnea      Past Surgical History:   Procedure Laterality Date   • ABOVE KNEE AMPUTATION Left    • BACK SURGERY     • CHOLECYSTECTOMY     • COLON SURGERY     • COLOSTOMY     • SKIN BIOPSY     • TRUNK DEBRIDEMENT Right 4/26/2017    Procedure: DEBRIDEMENT ISHEAL ULCER/BUTTOCKS WOUND RT.HIP;  Surgeon: Scooter Moran MD;  Location: [facility] OR;  Service:      Social History     Occupational History   • Not on file.     Social History Main Topics   • Smoking status: Never Smoker   • Smokeless tobacco: Not on file   • Alcohol use Yes      Comment: occassional    • Drug use: Yes     Special: Marijuana   • Sexual activity: Defer    Social History     Social History Narrative     History reviewed. No pertinent family history.  Current Facility-Administered Medications   Medication Dose Route Frequency Provider Last Rate Last Dose   • acetaminophen (TYLENOL) tablet 650 mg  650 mg Oral Q6H PRN Javier Nolanell, DO       • acetaminophen (TYLENOL) tablet 650 mg  650 mg Oral Q6H PRN Javier Steenxell, DO   650 mg at 07/29/17 2213   • baclofen (LIORESAL) tablet 30 mg  30 mg Oral Q8H PRN Javier Steenxell, DO   30 mg at 07/29/17 2121   • castor oil-balsam peru (VENELEX) ointment   Topical Q12H Carlin Landers MD       • dextrose (D50W) solution 25 g  25 g Intravenous Q15 Min PRN Carlin Landers MD       • dextrose (D50W) solution 25 g  25 g Intravenous Q15 Min PRN Javier Nolanell, DO       • dextrose (GLUTOSE) oral gel 15 g  15 g Oral Q15 Min PRN Carlin Landers MD       • dextrose (GLUTOSE) oral gel 15 g  15 g Oral Q15 Min PRN Javier Steenxell, DO       • DULoxetine (CYMBALTA) DR capsule 60 mg  60 mg Oral BID Javier Steenxell, DO   60 mg at 07/31/17 0834   • gabapentin (NEURONTIN) capsule 400 mg  400 mg Oral Q8H Javier Steenxell, DO   400 mg at 07/31/17 0525   • glucagon  (human recombinant) (GLUCAGEN DIAGNOSTIC) injection 1 mg  1 mg Subcutaneous Q15 Min PRN Carlin Landers MD       • glucagon (human recombinant) (GLUCAGEN DIAGNOSTIC) injection 1 mg  1 mg Subcutaneous Q15 Min PRN Javier Nolanell, DO       • heparin (porcine) 5000 UNIT/ML injection 5,000 Units  5,000 Units Subcutaneous Q8H Javier Nolanell, DO   5,000 Units at 07/31/17 0550   • ibuprofen (ADVIL,MOTRIN) tablet 600 mg  600 mg Oral TID PRN Javier Nolanell, DO       • insulin aspart (novoLOG) injection 0-7 Units  0-7 Units Subcutaneous 4x Daily AC & at Bedtime Javier Moscoso, DO   Stopped at 07/30/17 2307   • levoFLOXacin (LEVAQUIN) 500 mg/100 mL D5W (premix) (LEVAQUIN) 500 mg  500 mg Intravenous Q24H Tra Jain MD   500 mg at 07/31/17 0835   • Magnesium Sulfate 2 gram Bolus, followed by 8 gram infusion (total Mg dose 10 grams)- Mg less than or equal to 1mg/dL  2 g Intravenous PRN Javier Nolanell, DO        Or   • Magnesium Sulfate 6 gram Infusion (2 gm x 3) -Mg 1.1 -1.5 mg/dL  2 g Intravenous PRN Javier Nolanell, DO        Or   • magnesium sulfate 4 gram infusion- Mg 1.6-1.9 mg/dL  4 g Intravenous PRN Javier Nolanell, DO 25 mL/hr at 07/30/17 0241 4 g at 07/30/17 0241   • magnesium sulfate 4 gram infusion- Mg 1.6-1.9 mg/dL  4 g Intravenous Once Carlin Landers MD 25 mL/hr at 07/31/17 0833 4 g at 07/31/17 0833   • menthol-zinc oxide (CALMOSEPTINE) 0.44-20.625 % ointment   Topical TID PRN Javier Nolanell, DO       • methenamine (HIPREX) tablet 1 g  1 g Oral Q12H Javier Nolanell, DO   1 g at 07/31/17 0834   • modafinil (PROVIGIL) tablet 200 mg  200 mg Oral Daily Javier Moscoso DO   200 mg at 07/31/17 0834   • multivitamin (DAILY SARAH) tablet 1 tablet  1 tablet Oral Daily Javier Moscoso DO   1 tablet at 07/31/17 0834   • nystatin (MYCOSTATIN) powder   Topical Q12H Carlin Landers MD       • pantoprazole (PROTONIX) EC tablet 40 mg   40 mg Oral Q AM Christopher A Danis, DO   40 mg at 07/31/17 0525   • potassium chloride (MICRO-K) CR capsule 40 mEq  40 mEq Oral PRN Christopher A Danis, DO        Or   • potassium chloride (KLOR-CON) packet 40 mEq  40 mEq Oral PRN Christopher A Danis, DO        Or   • potassium chloride 10 mEq in 100 mL IVPB  10 mEq Intravenous Q1H PRN Rizwaner A Danis, DO       • potassium chloride 10 mEq in 100 mL IVPB  10 mEq Intravenous Q1H Carlin Landers MD       • potassium phosphate 45 mmol in sodium chloride 0.9 % 500 mL infusion  45 mmol Intravenous PRN Christopher A Danis, DO        Or   • potassium phosphate 30 mmol in sodium chloride 0.9 % 250 mL infusion  30 mmol Intravenous PRN Christopher A Danis, DO        Or   • potassium phosphate 15 mmol in sodium chloride 0.9 % 100 mL infusion  15 mmol Intravenous PRN Christopher A Danis, DO        Or   • sodium phosphates 45 mmol in sodium chloride 0.9 % 250 mL IVPB  45 mmol Intravenous PRN Rizwaner A Danis, DO        Or   • sodium phosphates 30 mmol in sodium chloride 0.9 % 250 mL IVPB  30 mmol Intravenous PRN Christopher A Danis, DO        Or   • sodium phosphates 15 mmol in sodium chloride 0.9 % 250 mL IVPB  15 mmol Intravenous PRN Christopher A Danis, DO       • promethazine (PHENERGAN) tablet 25 mg  25 mg Oral Q6H PRN Christopher A Danis, DO        Or   • promethazine (PHENERGAN) 12.5 mg in sodium chloride 0.9 % 50 mL  12.5 mg Intravenous Q6H PRN Christopher A Danis, DO   12.5 mg at 07/30/17 1252   • sodium chloride 0.9 % flush 1-10 mL  1-10 mL Intravenous PRN Carlin Landers MD       • sodium chloride 0.9 % flush 10 mL  10 mL Intravenous PRN GUANACO Mera       • sodium chloride 0.9 % infusion  75 mL/hr Intravenous Continuous Christopher A Danis, DO 75 mL/hr at 07/30/17 2050 75 mL/hr at 07/30/17 2050   • sodium hypochlorite (DAKIN'S) topical solution 0.25% (half strength)   Irrigation BID Sung Blair MD  "      • sucralfate (CARAFATE) tablet 1 g  1 g Oral 4x Daily Rizwaner MARTIN Danis, DO   1 g at 07/31/17 0834   • topiramate (TOPAMAX) tablet 100 mg  100 mg Oral TID Dexopher A Danis, DO   100 mg at 07/31/17 0833   • traZODone (DESYREL) tablet 50 mg  50 mg Oral Nightly PRN Dexopher A Danis, DO   50 mg at 07/29/17 2118   • vancomycin (VANCOCIN) 2,000 mg in sodium chloride 0.9 % 500 mL IVPB  2,000 mg Intravenous Q12H Sneha Harrell, RPH   2,000 mg at 07/31/17 0004   • vitamin C (ASCORBIC ACID) tablet 250 mg  250 mg Oral BID Dexopher A Danis, DO   250 mg at 07/31/17 0833   • vitamin D3 capsule 5,000 Units  5,000 Units Oral Daily [facility]   5,000 Units at 07/31/17 0834     Allergies   Allergen Reactions   • Keflex [Cephalexin] Rash     Patient states \"red man syndrome\"        Review of Systems   Constitution: Negative.   HENT: Negative.    Eyes: Negative.    Cardiovascular: Negative.    Respiratory: Negative.    Endocrine: Negative.    Hematologic/Lymphatic: Negative.    Skin: Negative.    Musculoskeletal:        Pertinent positives listed in HPI   Gastrointestinal: Negative.    Genitourinary: Negative.    Neurological: Negative.    Psychiatric/Behavioral: Negative.    Allergic/Immunologic: Negative.            Vitals:    07/30/17 2353 07/31/17 0455 07/31/17 0624 07/31/17 0805   BP:  132/74  141/89   BP Location:  Right arm  Right arm   Patient Position:  Lying  Lying   Pulse:  80  80   Resp:    20   Temp:    98.7 °F (37.1 °C)   TempSrc:    Axillary   SpO2: 99%  98%    Weight:  261 lb 3.2 oz (118 kg)     Height:            Physical Exam   Constitutional: He is oriented to person, place, and time. He appears well-developed and well-nourished.   HENT:   Head: Normocephalic and atraumatic.   Mouth/Throat: Oropharynx is clear and moist.   Eyes: Conjunctivae and EOM are normal. Pupils are equal, round, and reactive to light.   Neck: Normal range of motion. Neck supple. No JVD present. No " thyromegaly present.   Cardiovascular: Normal rate, regular rhythm and normal heart sounds.  Exam reveals no gallop and no friction rub.    No murmur heard.  Pulmonary/Chest: Effort normal and breath sounds normal. No respiratory distress. He has no wheezes. He has no rales. He exhibits no tenderness.   Abdominal: Bowel sounds are normal.   Musculoskeletal:   Right buttock wound reveals wound VAC with erythema and foul odor. Right hip reveals passive painful range of motion with trace edema right lower extremity. Remainder of the range of motion examination was deferred secondary to patient's history of paraplegia. Left lower extremity reveals evidence of previous AKA.     Lymphadenopathy:     He has no cervical adenopathy.   Neurological: He is alert and oriented to person, place, and time.   Skin: Skin is warm and dry.   Psychiatric: He has a normal mood and affect.   Nursing note and vitals reviewed.      Labs:      Results from last 7 days  Lab Units 07/31/17 0447 07/30/17  0111 07/29/17  0528 07/28/17  2314   CRP mg/dL 16.24* 27.31* 20.15*  --    LACTATE mmol/L  --   --   --  2.0   WBC 10*3/mm3 10.75 12.46  --  24.12*   HEMOGLOBIN g/dL 9.1* 9.1*  --  10.6*   HEMATOCRIT % 29.2* 30.5*  --  34.4*   MCV fL 89.3 90.2  --  89.1   MCHC g/dL 31.2* 29.8*  --  30.8*   PLATELETS 10*3/mm3 437* 444*  --  455*       Results from last 7 days  Lab Units 07/29/17  0804   PH, ARTERIAL pH units 7.335*   PO2 ART mm Hg 127.4*   PCO2, ARTERIAL mm Hg 36.9   HCO3 ART mmol/L 19.2*       Results from last 7 days  Lab Units 07/31/17  0447 07/30/17  0420 07/30/17  0111 07/28/17  2314   SODIUM mmol/L 139  --  140  --  138   POTASSIUM mmol/L 3.5 4.3 3.4*  < > 3.3*   MAGNESIUM mg/dL 1.9  --  1.8  --   --    CHLORIDE mmol/L 111  --  113*  --  108   CO2 mmol/L 20.7*  --  20.1*  --  20.6*   BUN mg/dL 9  --  13  --  24*   CREATININE mg/dL 0.52  --  0.67  --  1.04   EGFR IF NONAFRICN AM mL/min/1.73 >150  --  132  --  80   CALCIUM mg/dL 8.8  --   8.4  --  9.3   GLUCOSE mg/dL 97  --  103  --  146*   ALBUMIN g/dL 3.10*  --  3.10*  --  3.70   BILIRUBIN mg/dL 0.7  --  0.9  --  1.6   ALK PHOS U/L 165*  --  139*  --  149*   AST (SGOT) U/L 58*  --  56*  --  39*   ALT (SGPT) U/L 54*  --  45*  --  34   < > = values in this interval not displayed.Estimated Creatinine Clearance: 249.3 mL/min (by C-G formula based on Cr of 0.52).  No results found for: AMMONIA    Results from last 7 days  Lab Units 07/29/17  0107 07/28/17  2314   TROPONIN I ng/mL 0.010 0.012         Hemoglobin A1C   Date Value Ref Range Status   07/28/2017 5.70 4.50 - 5.70 % Final     Lab Results   Component Value Date    TSH 0.642 04/07/2015         Pain Management Panel     Pain Management Panel Latest Ref Rng & Units 7/29/2017    AMPHETAMINES SCREEN, URINE Negative Negative    BARBITURATES SCREEN Negative Negative    BENZODIAZEPINE SCREEN, URINE Negative Negative    BUPRENORPHINE Negative Negative    COCAINE SCREEN, URINE Negative Negative    METHADONE SCREEN, URINE Negative Negative          Blood Culture   Date Value Ref Range Status   07/29/2017 No growth at 24 hours  Preliminary   07/29/2017 No growth at 24 hours  Preliminary   07/28/2017 Gram positive cocci, consistent with Staph spp (A)  Preliminary   07/28/2017 No growth at 2 days  Preliminary     Urine Culture   Date Value Ref Range Status   07/29/2017 >100,000 CFU/mL Escherichia coli (A)  Final     Wound Culture   Date Value Ref Range Status   07/29/2017 Culture in progress  Preliminary            Radiology:       Imaging Results (last 72 hours)     Procedure Component Value Units Date/Time    CT Head Without Contrast [[id number]] Collected:  07/29/17 0840     Updated:  07/29/17 0843    Narrative:       EXAMINATION: CT HEAD WO CONTRAST-      CLINICAL INDICATION:     ams     COMPARISON:    None     Technique: Multiple CT axial images were obtained through the level of  the brain without IV contrast administration. Reformatted images in  the  coronal and/or sagittal plane(s) were generated from the axial data set  to facilitate diagnostic accuracy and/or surgical planning.     Radiation dose reduction techniques were utilized per ALARA protocol.  Automated exposure control was initiated through either or CareDoTwist and Shout or  DoseRight software packages by  protocol.       DOSE (DLP mGy-cm): 1173.4 mGy.cm     FINDINGS:   No acute intracranial abnormality. No midline shift or mass  effect. No hydrocephalus or intracranial hemorrhage. There is no CT  evidence of acute vascular territory infarct.  Bone windows show no  acute osseous abnormality.       Impression:       No CT evidence of acute intracranial abnormality.     This report was finalized on 7/29/2017 8:41 AM by Dr. Sreedhar Gray MD.       XR Chest 1 View [[id number]] Collected:  07/29/17 0841     Updated:  07/29/17 0843    Narrative:       EXAMINATION: XR CHEST 1 VW-      CLINICAL INDICATION:     fever, ams     TECHNIQUE:  XR CHEST 1 VW-      COMPARISON: NONE      FINDINGS:   Lungs are aerated.   Cardiomegaly.   No pneumothorax.   No pleural effusion.   No acute osseous findings.  Cervicothoracic fusion.       Impression:       Cardiomegaly. No acute cardiopulmonary findings.     This report was finalized on 7/29/2017 8:41 AM by Dr. Sreedhar Gray MD.       CT Abdomen Pelvis With Contrast [[id number]] Collected:  07/29/17 0905     Updated:  07/29/17 0913    Narrative:          CT ABDOMEN PELVIS W CONTRAST-        TECHNIQUE: Multiple axial CT images were obtained from lung bases  through pubic symphysis with administration of IV contrast. Reformatted  images in the coronal and/or sagittal plane(s) were generated from the  axial data set to facilitate diagnostic accuracy and/or surgical  planning.  Oral Contrast:NONE.     Radiation dose reduction techniques were utilized per ALARA protocol.  Automated exposure control was initiated through either or CareDose or  DoseRight software packages by   protocol.       Radiation dose reduction techniques were utilized per ALARA protocol.  Automated exposure control was initiated through either or CareDoTapZen or  DoseRigSimpleReach software packages by  protocol.               Clinical information  fever      Comparison  NONE.     Findings  LOWER THORAX: LEFT BASILAR ATELECTASIS.     ABDOMEN:        LIVER: MARKED DIFFUSE FATTY INFILTRATION OF LIVER.        GALLBLADDER: GALLBLADDER IS SURGICALLY ABSENT.        PANCREAS: Unremarkable. No mass or ductal dilatation.        SPLEEN: Homogeneous. No splenomegaly.        ADRENALS: No mass.        KIDNEYS: NO HYDRONEPHROSIS IS NOTED.        GI TRACT: PATIENT HAS HAD CHANGES OF A PROBABLE SIGMOID COLON  RESECTION WITH A LEFT LOWER QUADRANT DIVERTING COLOSTOMY NOTED. NO BOWEL  OBSTRUCTION IDENTIFIED.        PERITONEUM: No free air. No free fluid or loculated fluid  collections.        MESENTERY: Unremarkable.        LYMPH NODES: No lymphadenopathy.        VASCULATURE: No evidence of aneurysm.        ABDOMINAL WALL: No focal hernia or mass.           OTHER: LEFT GLUTEAL REGION CELLULITIS WITHOUT ULCERATION. RIGHT  DECUBITUS ULCER NOTED WITH DESTRUCTIVE OSSEOUS CHANGES OF THE RIGHT  ISSUE HIM AND INFERIOR PUBIC RAMUS MOST CONSISTENT WITH OSTEOMYELITIS.     PELVIS:        BLADDER: No focal mass or significant wall thickening        REPRODUCTIVE: QUESTION CHANGES OF A PROBABLE PROSTATE RESECTION GIVEN  A BULBOUS APPEARANCE OF THE PROSTHETIC URETHRA THAT IS ESSENTIALLY  STABLE FROM PREVIOUS BUT HAS A MAXIMAL TRANSVERSE DIAMETER OF 4.2 CM.        APPENDIX: Nondistended. No surrounding inflammation.     BONES: DESTRUCTIVE OSSEOUS CHANGES OF THE RIGHT ISSUE HIM SUBJACENT TO  DECUBITUS ULCER CONCERNING FOR OSTEOMYELITIS. CHRONIC EROSIVE CHANGES  WITH COMPLETE OSTEOLYSIS OF THE RIGHT FEMORAL HEAD. THIS COULD ALSO BE  SEEN IN INFECTIOUS ETIOLOGY ALTHOUGH CHRONIC INFLAMMATORY ARTHROPATHY IS  FAVORED.       Impression:        Impression:  1. LEFT SIDE GLUTEAL REGION CELLULITIS WITHOUT EVIDENCE OF DEFINITE  ABSCESS OR SIGNIFICANT ULCERATION.  2. RIGHT DECUBITUS ULCER EXTENDING TO THE INITIAL BONE. QUESTION SOME  DESTRUCTIVE OSSEOUS CHANGE OF THE RIGHT INITIAL TUBEROSITY WHICH CAN BE  SEEN WITH OSTEOMYELITIS.  3. PARTIAL SIGMOID COLON RESECTION WITH LEFT LOWER QUADRANT DIVERTING  ILEOSTOMY. NO BOWEL OBSTRUCTION.  4. PROBABLE CHRONIC INFLAMMATORY ARTHROPATHY SEQUELA OF THE RIGHT  FEMORAL NECK VERSUS NONUNION OF AN OLD RIGHT FEMORAL NECK FRACTURE.  5. OTHER NONACUTE FINDINGS AS ABOVE.        This report was finalized on 7/29/2017 9:10 AM by Dr. Sreedhar Gray MD.       CT Chest With Contrast [[id number]] Collected:  07/29/17 0911     Updated:  07/29/17 0915    Narrative:       EXAM: CT CHEST W CONTRAST-              CLINICAL INDICATION:sepsis unknown source      COMPARISON: NONE.     TECHNIQUE: Multiple axial CT images were obtained from lung apex through  upper abdomen with administration of IV contrast. Reformatted images in  the coronal and/or sagittal plane(s) were generated from the axial data  set to facilitate diagnostic accuracy and/or surgical planning.  Oral Contrast:NONE.     Radiation dose reduction techniques were utilized per ALARA protocol.  Automated exposure control was initiated through either or CareDorankur or  DoseRigPhysihome software packages by  protocol.    DOSE (DLP mGy-cm): 3025.22 mGy.cm        FINDINGS:     LUNGS: Pulmonary vascular congestion. Probable atelectasis of the  lingula. Tiny right lung nodules favor granulomatous change. No  suspicious nodules identified.     HEART: Mild cardiomegaly.     PERICARDIUM: No effusion.     MEDIASTINUM: No masses. No enlarged lymph nodes.  No fluid collections.     PLEURA: No pleural effusion. No pleural mass or abnormal calcification.     MAJOR AIRWAYS: Clear. No intrinsic mass.     VASCULATURE: No evidence of aneurysm.     VISUALIZED UPPER ABDOMEN:        OTHER: None.      BONES: Cervicothoracic spinal fusion.       Impression:       1. Cardiac megaly and pulmonary vascular congestion.  2. Lingular atelectasis.  3. Fatty infiltration of liver.  4. Otherwise no acute thoracic findings identified.     This report was finalized on 7/29/2017 9:13 AM by Dr. Sreedhar Gray MD.                 Assesment:    ICD-10-CM ICD-9-CM   1. Sepsis, due to unspecified organism A41.9 038.9     995.91   2. Acute osteomyelitis of other site M86.18          Plan:    Agree with infectious disease plan to continue with antibiotic profile. Await wound culture results. General surgery and I&D to address gluteal wound. In reference to the right hip pain as well as the Diagnostic imaging revealing a possible chronic inflammatory arthropathy, MRI pelvis ordered to gain a better insight of the ischial tuberosity changes thought to represent osteomyelitis as well to rule out involvement of the femoracetabular joint as he does have the osteolysis of the femoral head           This document was signed by Umang Montanez PA-C July 31, 2017        Electronically signed by Bryce Mason MD at 7/31/2017  6:50 PM           Nutrition Notes (most recent note)      Rin Medina RD at 7/29/2017 11:06 AM  Version 1 of 1         Nutrition Services    Patient Name:  Ken Krueger  YOB: 1975  MRN: [id number]  Admit Date:  7/28/2017        Rec MVI daily to aide with wound healing.      Electronically signed by:  Rin Medina RD  07/29/17 11:06 AM      Electronically signed by Rin Medina RD at 7/29/2017 11:06 AM        Physical Therapy Notes (most recent note)     No notes of this type exist for this encounter.        Occupational Therapy Notes (most recent note)     No notes of this type exist for this encounter.        Speech Language Pathology Notes (most recent note)     No notes of this type exist for this encounter.        Respiratory Therapy Notes (most recent note)     No notes  of this type exist for this encounter.            Discharge Summary     No notes of this type exist for this encounter.        Discharge Order     Start     Ordered    08/04/17 0916  Discharge patient  Once     Expected Discharge Date:  08/04/17    Discharge Disposition:  Home or Self Care        08/04/17 0918
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
## 2017-08-04 NOTE — PROGRESS NOTES
Antimicrobial Length of Therapy:    Day 5 levofloxacin  Day 7 vancomycin IV    Thank you.  Sneha Harrell, Pharm.D.  8/4/2017  8:28 AM

## 2017-08-04 NOTE — DISCHARGE INSTRUCTIONS
Locations: coccyx Wound care/ Twice a day        Cleanse Normal Saline        Intervention: Venelex - thin layer        Dressing: Silicone border dressing       Locations: right buttock Wound care / twice a day        Cleanse Dakins Solution 1/4 Strength        Intervention: Strip packing gauze        Moistened? Yes        With: Dakins solution 1/4 strength        Dressing: Silicone border dressing

## 2017-08-04 NOTE — PROGRESS NOTES
Discharge Planning Assessment  [facility]     Patient Name: Ken Krueger  MRN: [id number]  Today's Date: 8/4/2017    Admit Date: 7/28/2017       Discharge Plan       08/04/17 1027    Final Note    Final Note Pt is being discharged home today. SS received prescription for IV Vancomycin 2000 mg every 12 hours for 36 days. Pt utilizes [facility] and [facility]. SS contacted [facility] per Richelle. SS contacted [facility] per [place]. SS faxed information including prescription to [facility] and [facility]. Nurse to call report to [facility]. SS contacted [facility] [phone] to make aware. SS to f/u with [facility] for copay for IV Vanco. SS to follow.     12:10 SS received call from [facility] per [place] who states pt does not have a copay for IV Vancomycin. No other needs identified.         Discharge Placement     Facility/Agency Request Status Selected? Address Phone Number Fax Number    [facility] Pending - No Request Sent     [address] [id number] [phone]    [facility] Declined     [address] [id number] [phone]        Expected Discharge Date and Time     Expected Discharge Date Expected Discharge Time    Aug 4, 2017                Alycia Reed

## 2017-08-04 NOTE — DISCHARGE PLACEMENT REQUEST
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 Collected:  07/28/17 2314    Specimen:  Blood from Arm, Right Updated:  07/28/17 2357     WBC 24.12 (H) 10*3/mm3      RBC 3.86 (L) 10*6/mm3      Hemoglobin 10.6 (L) g/dL      Hematocrit 34.4 (L) %      MCV 89.1 fL      MCH 27.5 pg      MCHC 30.8 (L) g/dL      RDW 14.0 %      RDW-SD 45.6 fl      MPV 9.9 fL      Platelets 455 (H) 10*3/mm3     Scan Slide [[id number]] Collected:  07/28/17 2314    Specimen:  Blood from Arm, Right Updated:  07/28/17 2357     Scan Slide --      See Manual Differential Results       Manual Differential [[id number]]  (Abnormal) Collected:  07/28/17 2314    Specimen:  Blood from Arm, Right Updated:  07/28/17 2357     Neutrophil % 82.0 (H) %      Lymphocyte % 3.0 (L) %      Monocyte % 5.0 %      Eosinophil % 3.0 %      Bands %  7.0 %      Neutrophils Absolute 21.47 (H) 10*3/mm3      Lymphocytes Absolute 0.72 (L) 10*3/mm3      Monocytes Absolute 1.21 (H) 10*3/mm3      Eosinophils Absolute 0.72 (H) 10*3/mm3      Hypochromia Slight/1+     Platelet Estimate Increased    Lactic Acid, Plasma [[id number]]  (Normal) Collected:  07/28/17 2314    Specimen:  Blood from Arm, Right Updated:  07/29/17 0018     Lactate 2.0 mmol/L     Troponin  [[id number]]  (Normal) Collected:  07/28/17 2314    Specimen:  Blood from Arm, Right Updated:  07/29/17 0022     Troponin I 0.012 ng/mL     Narrative:       Ultra Troponin I Reference Range:         <=0.039 ng/mL: Negative    0.04-0.779 ng/mL: Indeterminate Range. Suspicious of MI.  Clinical correlation required.       >=0.78  ng/mL: Consistent with myocardial injury.  Clinical correlation required.    CK-MB [[id number]]  (Normal) Collected:  07/28/17 2314    Specimen:  Blood from Arm, Right Updated:  07/29/17 0022     CKMB 0.88 ng/mL     Comprehensive Metabolic Panel [[id number]]  (Abnormal) Collected:  07/28/17 2314    Specimen:  Blood from Arm, Right Updated:  07/29/17 0027     Glucose 146 (H) mg/dL      BUN 24 (H) mg/dL      Creatinine 1.04 mg/dL      Sodium 138 mmol/L      Potassium 3.3 (L) mmol/L      Chloride 108 mmol/L      CO2 20.6 (L) mmol/L      Calcium 9.3 mg/dL      Total Protein 8.2 (H) g/dL      Albumin 3.70 g/dL      ALT (SGPT) 34 U/L      AST (SGOT) 39 (H) U/L      Alkaline Phosphatase 149 (H) U/L       Note New Reference Ranges        Total Bilirubin 1.6 mg/dL       1+ Icteric         eGFR Non African Amer 80 mL/min/1.73      Globulin 4.5 gm/dL      A/G Ratio 0.8 (L) g/dL      BUN/Creatinine Ratio 23.1     Anion Gap 9.4 mmol/L     Osmolality, Calculated [[id number]]  (Normal) Collected:  07/28/17 2314    Specimen:  Blood from Arm, Right Updated:  07/29/17 0027     Osmolality Calc 282.4 mOsm/kg     POC Glucose Fingerstick [[id number]]  (Normal) Collected:  07/29/17 0025    Specimen:  Blood Updated:  07/29/17 0033     Glucose 117 mg/dL     Narrative:       Meter: [id number] : [id number] BROWN RICH D    Urinalysis With / Culture If Indicated [[id number]]  (Abnormal) Collected:  07/29/17 0108    Specimen:  Urine from Urinary Bladder Updated:  07/29/17 0125     Color, UA Dark Yellow (A)     Appearance, UA Cloudy (A)     pH, UA 6.0     Specific Gravity, UA 1.016     Glucose, UA Negative     Ketones, UA  Negative     Bilirubin, UA Small (1+) (A)     Blood, UA Trace (A)     Protein, UA 30 mg/dL (1+) (A)     Leuk Esterase, UA Large (3+) (A)     Nitrite, UA Negative     Urobilinogen, UA 2.0 E.U./dL (A)    Urinalysis, Microscopic Only [[id number]]  (Abnormal) Collected:  07/29/17 0108    Specimen:  Urine from Urinary Bladder Updated:  07/29/17 0128     RBC, UA 6-12 (A) /HPF      WBC, UA 21-30 (A) /HPF      Bacteria, UA 2+ (A) /HPF      Squamous Epithelial Cells, UA 7-12 (A) /HPF      Hyaline Casts, UA None Seen /LPF      Methodology Manual Light Microscopy    Urine Drug Screen [[id number]]  (Abnormal) Collected:  07/29/17 0108    Specimen:  Urine from Urinary Bladder Updated:  07/29/17 0134     Amphetamine Screen, Urine Negative     Barbiturates Screen, Urine Negative     Benzodiazepine Screen, Urine Negative     Cocaine Screen, Urine Negative     Methadone Screen, Urine Negative     Opiate Screen Negative     Phencyclidine (PCP), Urine Negative     THC, Screen, Urine Positive (A)     6-ACETYL MORPHINE Negative     Buprenorphine, Screen, Urine Negative     Oxycodone Screen, Urine Negative    Narrative:       Negative Thresholds For Drugs Screened:                  Amphetamines              1000 ng/ml               Barbiturates               200 ng/ml               Benzodiazepines            200 ng/ml              Cocaine                    300 ng/ml              Methadone                  300 ng/ml              Opiates                    300 ng/ml               Phencyclidine               25 ng/ml               THC                         50 ng/ml              6-Acetyl Morphine           10 ng/ml              Buprenorphine                5 ng/ml              Oxycodone                  300 ng/ml    The reference range for all drugs tested is negative. This report includes final unconfirmed qualitative results to be used for medical treatment purposes only. Unconfirmed results must not be used for non-medical purposes such  as employment or legal testing. Clinical consideration should be applied to any drug of abuse test, especially when unconfirmed quantitative results are used.      Blood Gas, Arterial [[id number]]  (Abnormal) Collected:  07/29/17 0124    Specimen:  Arterial Blood Updated:  07/29/17 0135     Site Arterial: right radial     Kalpesh's Test Positive     pH, Arterial 7.210 (C) pH units      pCO2, Arterial 49.6 (H) mm Hg      pO2, Arterial 79.7 (L) mm Hg      HCO3, Arterial 19.4 (C) mmol/L      Base Excess, Arterial -8.2 mmol/L      O2 Saturation, Arterial 93.5 %      Hemoglobin, Blood Gas 9.7 (L) g/dL      Hematocrit, Blood Gas 29.0 (L) %      Oxyhemoglobin 91.3 %      Methemoglobin 0.4 %      Carboxyhemoglobin 2.0 %      A-a Gradiant 51.1 mmHg      Temperature 98.6 C      Barometric Pressure for Blood Gas 723 mmHg      Modality Nasal Cannula     FIO2 28 %     Troponin [[id number]]  (Normal) Collected:  07/29/17 0107    Specimen:  Blood from Arm, Left Updated:  07/29/17 0142     Troponin I 0.010 ng/mL     Narrative:       Ultra Troponin I Reference Range:         <=0.039 ng/mL: Negative    0.04-0.779 ng/mL: Indeterminate Range. Suspicious of MI.  Clinical correlation required.       >=0.78  ng/mL: Consistent with myocardial injury.  Clinical correlation required.    CK-MB [[id number]]  (Normal) Collected:  07/29/17 0107    Specimen:  Blood from Arm, Left Updated:  07/29/17 0142     CKMB 1.01 ng/mL     Blood Gas, Arterial With Co-Ox [[id number]]  (Abnormal) Collected:  07/29/17 0307    Specimen:  Arterial Blood Updated:  07/29/17 0316     Site Arterial: right radial     Kalpesh's Test Positive     pH, Arterial 7.325 (L) pH units      pCO2, Arterial 44.4 mm Hg      pO2, Arterial 97.0 mm Hg      HCO3, Arterial 22.6 mmol/L      Base Excess, Arterial -3.3 mmol/L      O2 Saturation, Arterial 97.6 %      Hemoglobin, Blood Gas 9.9 (L) g/dL      Hematocrit, Blood Gas 29.0 (L) %      Oxyhemoglobin 95.6 %      Methemoglobin 0.2 %       Carboxyhemoglobin 1.8 %      A-a Gradiant 53.6 mmHg      Temperature 98.6 C      Barometric Pressure for Blood Gas 723 mmHg      Modality BiPAP     FIO2 30 %      Set [facility] Resp Rate 18     Rate 20 Breaths/minute      IPAP 24     EPAP 10    Hemoglobin A1c [[id number]]  (Normal) Collected:  07/28/17 2314    Specimen:  Blood from Arm, Right Updated:  07/29/17 0555     Hemoglobin A1C 5.70 %     C-reactive Protein [[id number]]  (Abnormal) Collected:  07/29/17 0528    Specimen:  Blood from Arm, Left Updated:  07/29/17 0612     C-Reactive Protein 20.15 (H) mg/dL       2+ Hemolysis         POC Glucose Fingerstick [[id number]]  (Normal) Collected:  07/29/17 0753    Specimen:  Blood Updated:  07/29/17 0803     Glucose 86 mg/dL     Narrative:       Meter: [id number] : [id number] RASHEED    Blood Gas, Arterial [[id number]]  (Abnormal) Collected:  07/29/17 0804    Specimen:  Arterial Blood Updated:  07/29/17 0807     Site Arterial: right radial     Kalpesh's Test Positive     pH, Arterial 7.335 (L) pH units      pCO2, Arterial 36.9 mm Hg      pO2, Arterial 127.4 (H) mm Hg      HCO3, Arterial 19.2 (C) mmol/L      Base Excess, Arterial -6.0 mmol/L      O2 Saturation, Arterial 98.6 %      Hemoglobin, Blood Gas 9.7 (L) g/dL      Hematocrit, Blood Gas 29.0 (L) %      Oxyhemoglobin 97.6 %      Methemoglobin 0.2 %      Carboxyhemoglobin 0.8 %      A-a Gradiant 59.5 mmHg      Temperature 98.6 C      Barometric Pressure for Blood Gas 723 mmHg      Modality Cannula - Nasal     FIO2 34 %     Hepatitis Panel, Acute [[id number]]  (Normal) Collected:  07/29/17 0913    Specimen:  Blood Updated:  07/29/17 1035     Hepatitis B Surface Ag Non-Reactive     Hep A IgM Non-Reactive     Hep B C IgM Non-Reactive     Hepatitis C Ab Non-Reactive    POC Glucose Fingerstick [[id number]]  (Normal) Collected:  07/29/17 1121    Specimen:  Blood Updated:  07/29/17 1129     Glucose 80 mg/dL     Narrative:       Meter: [id number] : [id number] WADE  KATHYA    Clostridium Difficile Toxin [[id number]] Collected:  07/29/17 1328    Specimen:  Stool from Per Rectum Updated:  07/29/17 1531    Narrative:       The following orders were created for panel order Clostridium Difficile Toxin.  Procedure                               Abnormality         Status                     ---------                               -----------         ------                     Clostridium Difficile To...[[id number]]                      Final result                 Please view results for these tests on the individual orders.    Clostridium Difficile Toxin, PCR [[id number]] Collected:  07/29/17 1328    Specimen:  Stool from Per Stoma Updated:  07/29/17 1531     C. Difficile Toxins by PCR Negative     027 Toxin Presumptive Negative    Narrative:         For In Vitro Diagnostic use only.  027-NAP1-BI results are NOT intended to guide treatment. 027 typing is relative to PCR ribotyping and shown to be equivalent to B1 typing by restriction endonuclease analysis or NAP1 typing by pulse field gel electrophoresis.    POC Glucose Fingerstick [[id number]]  (Normal) Collected:  07/29/17 1629    Specimen:  Blood Updated:  07/29/17 1723     Glucose 94 mg/dL     Narrative:       Meter: [id number] : [id number] MAURO RASHEED    Potassium [[id number]]  (Abnormal) Collected:  07/29/17 1749    Specimen:  Blood Updated:  07/29/17 1817     Potassium 3.4 (L) mmol/L     CBC & Differential [[id number]] Collected:  07/30/17 0111    Specimen:  Blood Updated:  07/30/17 0139    Narrative:       The following orders were created for panel order CBC & Differential.  Procedure                               Abnormality         Status                     ---------                               -----------         ------                     CBC Auto Differential[[id number]]        Abnormal            Final result                 Please view results for these tests on the individual orders.    CBC Auto Differential  [[id number]]  (Abnormal) Collected:  07/30/17 0111    Specimen:  Blood Updated:  07/30/17 0139     WBC 12.46 10*3/mm3      RBC 3.38 (L) 10*6/mm3      Hemoglobin 9.1 (L) g/dL      Hematocrit 30.5 (L) %      MCV 90.2 fL      MCH 26.9 (L) pg      MCHC 29.8 (L) g/dL      RDW 14.1 %      RDW-SD 45.3 fl      MPV 9.8 fL      Platelets 444 (H) 10*3/mm3      Neutrophil % 75.9 (H) %      Lymphocyte % 10.2 (L) %      Monocyte % 2.6 %      Eosinophil % 10.3 (H) %      Basophil % 0.2 %      Immature Grans % 0.8 (H) %      Neutrophils, Absolute 9.47 (H) 10*3/mm3      Lymphocytes, Absolute 1.27 10*3/mm3      Monocytes, Absolute 0.32 10*3/mm3      Eosinophils, Absolute 1.28 (H) 10*3/mm3      Basophils, Absolute 0.02 10*3/mm3      Immature Grans, Absolute 0.10 (H) 10*3/mm3     Magnesium [[id number]]  (Normal) Collected:  07/30/17 0111    Specimen:  Blood Updated:  07/30/17 0146     Magnesium 1.8 mg/dL     Phosphorus [[id number]]  (Abnormal) Collected:  07/30/17 0111    Specimen:  Blood Updated:  07/30/17 0146     Phosphorus 1.9 (L) mg/dL     Comprehensive Metabolic Panel [[id number]]  (Abnormal) Collected:  07/30/17 0111    Specimen:  Blood Updated:  07/30/17 0146     Glucose 103 mg/dL      BUN 13 mg/dL      Creatinine 0.67 mg/dL      Sodium 140 mmol/L      Potassium 3.4 (L) mmol/L      Chloride 113 (H) mmol/L      CO2 20.1 (L) mmol/L      Calcium 8.4 mg/dL      Total Protein 6.9 g/dL      Albumin 3.10 (L) g/dL      ALT (SGPT) 45 (H) U/L      AST (SGOT) 56 (H) U/L      Alkaline Phosphatase 139 (H) U/L       Note New Reference Ranges        Total Bilirubin 0.9 mg/dL      eGFR Non African Amer 132 mL/min/1.73      Globulin 3.8 gm/dL      A/G Ratio 0.8 (L) g/dL      BUN/Creatinine Ratio 19.4     Anion Gap 6.9 mmol/L     Osmolality, Calculated [[id number]]  (Normal) Collected:  07/30/17 0111    Specimen:  Blood Updated:  07/30/17 0146     Osmolality Calc 279.8 mOsm/kg     C-reactive Protein [[id number]]  (Abnormal) Collected:  07/30/17 0111     Specimen:  Blood Updated:  07/30/17 0149     C-Reactive Protein 27.31 (H) mg/dL     Potassium [[id number]]  (Normal) Collected:  07/30/17 0420    Specimen:  Blood Updated:  07/30/17 0535     Potassium 4.3 mmol/L       1+ Hemolysis        POC Glucose Fingerstick [[id number]]  (Normal) Collected:  07/29/17 2131    Specimen:  Blood Updated:  07/30/17 0614     Glucose 113 mg/dL     Narrative:       Meter: [id number] : [id number] Bladimir GONSALES    POC Glucose Fingerstick [[id number]]  (Normal) Collected:  07/30/17 0630    Specimen:  Blood Updated:  07/30/17 0636     Glucose 92 mg/dL     Narrative:       Meter: [id number] : [id number] Marietta Orellana    POC Glucose Fingerstick [[id number]]  (Normal) Collected:  07/30/17 0844    Specimen:  Blood Updated:  07/30/17 0909     Glucose 109 mg/dL     Narrative:       Meter: [id number] : [id number] Nasrin Dang    POC Glucose Fingerstick [[id number]]  (Normal) Collected:  07/30/17 1200    Specimen:  Blood Updated:  07/30/17 1207     Glucose 89 mg/dL     Narrative:       Meter: [id number] : [id number] leti jan    Vancomycin, Trough [[id number]]  (Abnormal) Collected:  07/30/17 1134    Specimen:  Blood Updated:  07/30/17 1223     Vancomycin Trough 15.10 (H) mcg/mL     POC Glucose Fingerstick [[id number]]  (Normal) Collected:  07/30/17 1700    Specimen:  Blood Updated:  07/30/17 1706     Glucose 92 mg/dL     Narrative:       Meter: [id number] : [id number] leti jan    POC Glucose Fingerstick [[id number]]  (Normal) Collected:  07/30/17 2016    Specimen:  Blood Updated:  07/30/17 2023     Glucose 97 mg/dL     Narrative:       Meter: [id number] : [id number] SEPIDEH LOFTON    CBC & Differential [[id number]] Collected:  07/31/17 0447    Specimen:  Blood Updated:  07/31/17 0458    Narrative:       The following orders were created for panel order CBC & Differential.  Procedure                               Abnormality         Status                      ---------                               -----------         ------                     CBC Auto Differential[[id number]]        Abnormal            Final result                 Please view results for these tests on the individual orders.    CBC Auto Differential [[id number]]  (Abnormal) Collected:  07/31/17 0447    Specimen:  Blood Updated:  07/31/17 0458     WBC 10.75 10*3/mm3      RBC 3.27 (L) 10*6/mm3      Hemoglobin 9.1 (L) g/dL      Hematocrit 29.2 (L) %      MCV 89.3 fL      MCH 27.8 pg      MCHC 31.2 (L) g/dL      RDW 14.1 %      RDW-SD 44.7 fl      MPV 9.6 fL      Platelets 437 (H) 10*3/mm3      Neutrophil % 69.1 %      Lymphocyte % 14.9 (L) %      Monocyte % 4.7 %      Eosinophil % 9.5 (H) %      Basophil % 0.2 %      Immature Grans % 1.6 (H) %      Neutrophils, Absolute 7.43 (H) 10*3/mm3      Lymphocytes, Absolute 1.60 10*3/mm3      Monocytes, Absolute 0.51 10*3/mm3      Eosinophils, Absolute 1.02 (H) 10*3/mm3      Basophils, Absolute 0.02 10*3/mm3      Immature Grans, Absolute 0.17 (H) 10*3/mm3     C-reactive Protein [[id number]]  (Abnormal) Collected:  07/31/17 0447    Specimen:  Blood Updated:  07/31/17 0522     C-Reactive Protein 16.24 (H) mg/dL     Iron Profile [[id number]]  (Abnormal) Collected:  07/31/17 0447    Specimen:  Blood Updated:  07/31/17 0522     Iron 26 (L) mcg/dL      TIBC 236 (L) mcg/dL      Iron Saturation 11 (L) %     Comprehensive Metabolic Panel [[id number]]  (Abnormal) Collected:  07/31/17 0447    Specimen:  Blood Updated:  07/31/17 0523     Glucose 97 mg/dL      BUN 9 mg/dL      Creatinine 0.52 mg/dL      Sodium 139 mmol/L      Potassium 3.5 mmol/L      Chloride 111 mmol/L      CO2 20.7 (L) mmol/L      Calcium 8.8 mg/dL      Total Protein 6.9 g/dL      Albumin 3.10 (L) g/dL      ALT (SGPT) 54 (H) U/L      AST (SGOT) 58 (H) U/L      Alkaline Phosphatase 165 (H) U/L       Note New Reference Ranges        Total Bilirubin 0.7 mg/dL      eGFR Non African Amer >150 mL/min/1.73       Globulin 3.8 gm/dL      A/G Ratio 0.8 (L) g/dL      BUN/Creatinine Ratio 17.3     Anion Gap 7.3 mmol/L     Magnesium [[id number]]  (Normal) Collected:  07/31/17 0447    Specimen:  Blood Updated:  07/31/17 0523     Magnesium 1.9 mg/dL     Osmolality, Calculated [[id number]]  (Normal) Collected:  07/31/17 0447    Specimen:  Blood Updated:  07/31/17 0523     Osmolality Calc 276.1 mOsm/kg     Vitamin D 25 Hydroxy [[id number]] Collected:  07/31/17 0447    Specimen:  Blood Updated:  07/31/17 0532     25 Hydroxy, Vitamin D 17.0 ng/ml     Narrative:       Reference Ranges for Total Vitamin D 25(OH)    Deficiency      <20.0 ng/ml  Insufficiency   20-30 ng/ml  Sufficiency     ng/ml  Toxicity         >100 ng/ml    Ferritin [[id number]]  (Abnormal) Collected:  07/31/17 0447    Specimen:  Blood Updated:  07/31/17 0533     Ferritin 492.00 (H) ng/mL     Vitamin B12 [[id number]]  (Normal) Collected:  07/31/17 0447    Specimen:  Blood Updated:  07/31/17 0533     Vitamin B-12 630 pg/mL     Folate [[id number]]  (Normal) Collected:  07/31/17 0447    Specimen:  Blood Updated:  07/31/17 0533     Folate 14.01 ng/mL     Phosphorus [[id number]]  (Normal) Collected:  07/31/17 0447    Specimen:  Blood Updated:  07/31/17 0533     Phosphorus 2.8 mg/dL     Urine Culture [[id number]]  (Abnormal)  (Susceptibility) Collected:  07/29/17 0108    Specimen:  Urine from Urinary Bladder Updated:  07/31/17 0704     Urine Culture >100,000 CFU/mL Escherichia coli (A)    Susceptibility      Escherichia coli     ROGER     Amikacin <=16 ug/ml Susceptible     Amoxicillin + Clavulanate <=8/4 ug/ml Susceptible     Ampicillin >16 ug/ml Resistant     Ampicillin + Sulbactam 16/8 ug/ml Intermediate     Aztreonam <=8 ug/ml Susceptible     Cefazolin <=8 ug/ml Susceptible     Ciprofloxacin >2 ug/ml Resistant     Doripenem <=0.5 ug/ml Susceptible     Ertapenem <=1 ug/ml Susceptible     Gentamicin >8 ug/ml Resistant     Imipenem <=1 ug/ml Susceptible     Levofloxacin >4 ug/ml  Resistant     Nitrofurantoin <=32 ug/ml Susceptible     Piperacillin + Tazobactam <=16 ug/ml Susceptible     Tetracycline <=4 ug/ml Susceptible     Tobramycin 8 ug/ml Intermediate     Trimethoprim + Sulfamethoxazole <=2/38 ug/ml Susceptible                    POC Glucose Fingerstick [[id number]]  (Normal) Collected:  07/31/17 0715    Specimen:  Blood Updated:  07/31/17 0740     Glucose 84 mg/dL     Narrative:       Meter: [id number] : [id number] Jimenez Bev    POC Glucose Fingerstick [[id number]]  (Normal) Collected:  07/31/17 1109    Specimen:  Blood Updated:  07/31/17 1119     Glucose 81 mg/dL     Narrative:       Meter: [id number] : [id number] Jimenez Patricia SALAZAR    C-reactive Protein [[id number]]  (Abnormal) Collected:  08/01/17 0058    Specimen:  Blood Updated:  08/01/17 0207     C-Reactive Protein 14.28 (H) mg/dL     Potassium [[id number]]  (Abnormal) Collected:  08/01/17 0058    Specimen:  Blood Updated:  08/01/17 0213     Potassium 3.3 (L) mmol/L     Comprehensive Metabolic Panel [[id number]]  (Abnormal) Collected:  08/01/17 0058    Specimen:  Blood Updated:  08/01/17 0213     Glucose 131 (H) mg/dL      BUN 10 mg/dL      Creatinine 0.63 mg/dL      Sodium 137 mmol/L      Potassium 3.3 (L) mmol/L      Chloride 108 mmol/L      CO2 21.6 (L) mmol/L      Calcium 9.1 mg/dL      Total Protein 7.1 g/dL      Albumin 3.20 (L) g/dL      ALT (SGPT) 49 (H) U/L      AST (SGOT) 37 (H) U/L      Alkaline Phosphatase 173 (H) U/L       Note New Reference Ranges        Total Bilirubin 0.5 mg/dL      eGFR Non African Amer 142 mL/min/1.73      Globulin 3.9 gm/dL      A/G Ratio 0.8 (L) g/dL      BUN/Creatinine Ratio 15.9     Anion Gap 7.4 mmol/L     Magnesium [[id number]]  (Normal) Collected:  08/01/17 0058    Specimen:  Blood Updated:  08/01/17 0213     Magnesium 1.7 mg/dL     Phosphorus [[id number]]  (Normal) Collected:  08/01/17 0058    Specimen:  Blood Updated:  08/01/17 0213     Phosphorus 2.8 mg/dL     Osmolality, Calculated  [[id number]]  (Normal) Collected:  08/01/17 0058    Specimen:  Blood Updated:  08/01/17 0213     Osmolality Calc 274.7 mOsm/kg     CBC & Differential [[id number]] Collected:  08/01/17 0058    Specimen:  Blood Updated:  08/01/17 0214    Narrative:       The following orders were created for panel order CBC & Differential.  Procedure                               Abnormality         Status                     ---------                               -----------         ------                     Scan Slide[[id number]]                                                                  CBC Auto Differential[[id number]]        Abnormal            Final result                 Please view results for these tests on the individual orders.    CBC Auto Differential [[id number]]  (Abnormal) Collected:  08/01/17 0058    Specimen:  Blood Updated:  08/01/17 0214     WBC 13.54 (H) 10*3/mm3      RBC 3.38 (L) 10*6/mm3      Hemoglobin 9.1 (L) g/dL      Hematocrit 29.5 (L) %      MCV 87.3 fL      MCH 26.9 (L) pg      MCHC 30.8 (L) g/dL      RDW 14.0 %      RDW-SD 44.7 fl      MPV 10.0 fL      Platelets 499 (H) 10*3/mm3      Neutrophil % 70.8 (H) %      Lymphocyte % 15.4 (L) %      Monocyte % 4.4 %      Eosinophil % 7.0 (H) %      Basophil % 0.4 %      Immature Grans % 2.0 (H) %      Neutrophils, Absolute 9.58 (H) 10*3/mm3      Lymphocytes, Absolute 2.09 10*3/mm3      Monocytes, Absolute 0.60 10*3/mm3      Eosinophils, Absolute 0.95 (H) 10*3/mm3      Basophils, Absolute 0.05 10*3/mm3      Immature Grans, Absolute 0.27 (H) 10*3/mm3      nRBC 0.0 /100 WBC     KOH Prep [[id number]]  (Normal) Collected:  08/01/17 1828    Specimen:  Swab from Hip, Right Updated:  08/01/17 1917     KOH Prep No yeast or hyphal elements seen    Body Fluid Cell Count With Differential [[id number]] Collected:  08/01/17 1829    Specimen:  Body Fluid from Hip, Right Updated:  08/01/17 1928    Narrative:       The following orders were created for panel order Body Fluid Cell  Count With Differential.  Procedure                               Abnormality         Status                     ---------                               -----------         ------                     Body fluid cell count[[id number]]                            Final result                 Please view results for these tests on the individual orders.    Body fluid cell count [[id number]] Collected:  08/01/17 1829    Specimen:  Body Fluid from Hip, Right Updated:  08/01/17 1928     Color, Fluid Colorless     Appearance, Fluid Clear     RBC, Fluid -- /mm3       N/A        Nucleated Cells, Fluid 0 /mm3     Narrative:       Differential not indicated.    Potassium [[id number]]  (Normal) Collected:  08/01/17 2228    Specimen:  Blood Updated:  08/01/17 2319     Potassium 3.8 mmol/L     C-reactive Protein [[id number]]  (Abnormal) Collected:  08/02/17 0155    Specimen:  Blood Updated:  08/02/17 0255     C-Reactive Protein 14.08 (H) mg/dL     Comprehensive Metabolic Panel [[id number]]  (Abnormal) Collected:  08/02/17 0155    Specimen:  Blood Updated:  08/02/17 0256     Glucose 96 mg/dL      BUN 10 mg/dL      Creatinine 0.60 mg/dL      Sodium 137 mmol/L      Potassium 3.7 mmol/L      Chloride 107 mmol/L      CO2 21.1 (L) mmol/L      Calcium 9.2 mg/dL      Total Protein 7.8 g/dL      Albumin 3.40 (L) g/dL      ALT (SGPT) 35 U/L      AST (SGOT) 27 U/L      Alkaline Phosphatase 183 (H) U/L       Note New Reference Ranges        Total Bilirubin 0.5 mg/dL      eGFR Non African Amer 150 mL/min/1.73      Globulin 4.4 gm/dL      A/G Ratio 0.8 (L) g/dL      BUN/Creatinine Ratio 16.7     Anion Gap 8.9 mmol/L     Magnesium [[id number]]  (Normal) Collected:  08/02/17 0155    Specimen:  Blood Updated:  08/02/17 0256     Magnesium 1.9 mg/dL     Osmolality, Calculated [[id number]]  (Abnormal) Collected:  08/02/17 0155    Specimen:  Blood Updated:  08/02/17 0256     Osmolality Calc 272.7 (L) mOsm/kg     Phosphorus [[id number]]  (Normal) Collected:   08/02/17 0155    Specimen:  Blood Updated:  08/02/17 0305     Phosphorus 4.1 mg/dL     CBC & Differential [[id number]] Collected:  08/02/17 0155    Specimen:  Blood Updated:  08/02/17 0309    Narrative:       The following orders were created for panel order CBC & Differential.  Procedure                               Abnormality         Status                     ---------                               -----------         ------                     Manual Differential[[id number]]          Abnormal            Final result               Scan Slide[[id number]]                                                                  CBC Auto Differential[[id number]]        Abnormal            Final result                 Please view results for these tests on the individual orders.    CBC Auto Differential [[id number]]  (Abnormal) Collected:  08/02/17 0155    Specimen:  Blood Updated:  08/02/17 0309     WBC 14.46 (H) 10*3/mm3      RBC 3.55 (L) 10*6/mm3      Hemoglobin 9.6 (L) g/dL      Hematocrit 31.4 (L) %      MCV 88.5 fL      MCH 27.0 pg      MCHC 30.6 (L) g/dL      RDW 14.0 %      RDW-SD 43.9 fl      MPV 9.8 fL      Platelets 535 (H) 10*3/mm3     Manual Differential [[id number]]  (Abnormal) Collected:  08/02/17 0155    Specimen:  Blood Updated:  08/02/17 0309     Neutrophil % 62.0 %      Lymphocyte % 26.0 %      Monocyte % 3.0 %      Eosinophil % 8.0 (H) %      Metamyelocyte % 1.0 (H) %      Neutrophils Absolute 8.97 (H) 10*3/mm3      Lymphocytes Absolute 3.76 (H) 10*3/mm3      Monocytes Absolute 0.43 10*3/mm3      Eosinophils Absolute 1.16 (H) 10*3/mm3      Hypochromia Slight/1+     Platelet Estimate Increased    Blood Culture [[id number]]  (Abnormal) Collected:  07/28/17 6997    Specimen:  Blood from Arm, Left Updated:  08/02/17 1520     Blood Culture Mixed Gram Positive Mariza (A)      Probable Contaminant.          Gram Stain Result Gram positive cocci    Wound Culture [[id number]]  (Abnormal)  (Susceptibility) Collected:   07/29/17 1047    Specimen:  Wound from Buttock, Right Updated:  08/02/17 1551     Wound Culture Scant growth (1+) Enterococcus faecalis (A)      Scant growth (1+) Streptococcus, Beta Hemolytic, Group B (A)        This isolate does not demonstrate inducible clindamycin resistance in vitro.          STREP GROUPING B     Wound Culture Scant growth (1+) Staphylococcus aureus (A)      This isolate does not demonstrate inducible clindamycin resistance in vitro.          Gram Stain Result Few (2+) WBCs seen      Occasional Gram positive cocci in pairs      Extracellular       Susceptibility      Enterococcus faecalis     ROGER     Ampicillin <=2 ug/ml Susceptible     Erythromycin <=0.5 ug/ml Susceptible     Levofloxacin <=1 ug/ml Susceptible     Penicillin G 2 ug/ml Susceptible     Rifampin 2 ug/ml Intermediate     Vancomycin 4 ug/ml Susceptible                Susceptibility      Streptococcus, Beta Hemolytic, Group B     ROGER     Ampicillin 0.12 ug/ml Susceptible     Azithromycin >2 ug/ml Resistant     Cefepime <=0.25 ug/ml Susceptible     Cefotaxime <=0.25 ug/ml Susceptible     Ceftriaxone <=0.25 ug/ml Susceptible     Clindamycin <=0.06 ug/ml Susceptible     Erythromycin >0.5 ug/ml Resistant     Levofloxacin 1 ug/ml Susceptible     Penicillin G 0.06 ug/ml Susceptible     Tetracycline >4 ug/ml Resistant     Vancomycin 0.5 ug/ml Susceptible                Susceptibility      Staphylococcus aureus     ROGER     Amoxicillin + Clavulanate <=4/2 ug/ml Susceptible     Ampicillin 4 ug/ml Resistant     Ampicillin + Sulbactam <=8/4 ug/ml Susceptible     Ceftriaxone <=8 ug/ml Susceptible     Ciprofloxacin <=1 ug/ml Susceptible     Clindamycin <=0.5 ug/ml Susceptible     Erythromycin >4 ug/ml Resistant     Gentamicin <=4 ug/ml Susceptible     Levofloxacin <=1 ug/ml Susceptible  [1]      Moxifloxacin <=0.5 ug/ml Susceptible     Oxacillin 0.5 ug/ml Susceptible     Penicillin G >8 ug/ml Resistant     Rifampin <=1 ug/ml Susceptible      Tetracycline <=4 ug/ml Susceptible     Trimethoprim + Sulfamethoxazole <=0.5/9.5 ug/ml Susceptible     Vancomycin 2 ug/ml Susceptible            [1]   Staphylococcus species may develop resistance during prolonged therapy with quinolones.  Isolates that are initially susceptible may become resistant within three to four days after initiation of therapy. Testing of repeat isolates may be warranted.                 POC Glucose Fingerstick [[id number]]  (Abnormal) Collected:  08/02/17 1834    Specimen:  Blood Updated:  08/02/17 1840     Glucose 132 (H) mg/dL     Narrative:       Meter: [id number] : [id number] [facility]    Blood Culture [[id number]]  (Normal) Collected:  07/28/17 2314    Specimen:  Blood from Arm, Right Updated:  08/03/17 0004     Blood Culture No growth at 5 days    CBC & Differential [[id number]] Collected:  08/03/17 0101    Specimen:  Blood Updated:  08/03/17 0135    Narrative:       The following orders were created for panel order CBC & Differential.  Procedure                               Abnormality         Status                     ---------                               -----------         ------                     Scan Slide[[id number]]                                                                  CBC Auto Differential[[id number]]        Abnormal            Final result                 Please view results for these tests on the individual orders.    CBC Auto Differential [[id number]]  (Abnormal) Collected:  08/03/17 0101    Specimen:  Blood Updated:  08/03/17 0135     WBC 15.02 (H) 10*3/mm3      RBC 3.62 (L) 10*6/mm3      Hemoglobin 9.8 (L) g/dL      Hematocrit 31.4 (L) %      MCV 86.7 fL      MCH 27.1 pg      MCHC 31.2 (L) g/dL      RDW 14.2 %      RDW-SD 44.6 fl      MPV 9.7 fL      Platelets 562 (H) 10*3/mm3      Neutrophil % 68.4 %      Lymphocyte % 18.4 (L) %      Monocyte % 4.2 %      Eosinophil % 6.4 (H) %      Basophil % 0.3 %      Immature Grans % 2.3 (H) %      Neutrophils,  Absolute 10.27 (H) 10*3/mm3      Lymphocytes, Absolute 2.77 10*3/mm3      Monocytes, Absolute 0.63 10*3/mm3      Eosinophils, Absolute 0.96 (H) 10*3/mm3      Basophils, Absolute 0.04 10*3/mm3      Immature Grans, Absolute 0.35 (H) 10*3/mm3      nRBC 0.0 /100 WBC     C-reactive Protein [[id number]]  (Abnormal) Collected:  08/03/17 0101    Specimen:  Blood Updated:  08/03/17 0153     C-Reactive Protein 12.67 (H) mg/dL     Comprehensive Metabolic Panel [[id number]]  (Abnormal) Collected:  08/03/17 0101    Specimen:  Blood Updated:  08/03/17 0156     Glucose 104 mg/dL      BUN 10 mg/dL      Creatinine 0.58 mg/dL      Sodium 137 mmol/L      Potassium 3.7 mmol/L      Chloride 106 mmol/L      CO2 24.6 mmol/L      Calcium 9.4 mg/dL      Total Protein 8.4 (H) g/dL      Albumin 3.70 g/dL      ALT (SGPT) 31 U/L      AST (SGOT) 21 U/L      Alkaline Phosphatase 176 (H) U/L       Note New Reference Ranges        Total Bilirubin 0.4 mg/dL      eGFR Non African Amer >150 mL/min/1.73      Globulin 4.7 gm/dL      A/G Ratio 0.8 (L) g/dL      BUN/Creatinine Ratio 17.2     Anion Gap 6.4 mmol/L     Magnesium [[id number]]  (Normal) Collected:  08/03/17 0101    Specimen:  Blood Updated:  08/03/17 0156     Magnesium 2.1 mg/dL     Osmolality, Calculated [[id number]]  (Normal) Collected:  08/03/17 0101    Specimen:  Blood Updated:  08/03/17 0156     Osmolality Calc 273.2 mOsm/kg     Phosphorus [[id number]]  (Abnormal) Collected:  08/03/17 0101    Specimen:  Blood Updated:  08/03/17 0158     Phosphorus 5.4 (H) mg/dL     Protein, Body Fluid [[id number]] Collected:  08/01/17 1827    Specimen:  Body Fluid from Knee, Right Updated:  08/03/17 0916     Protein, Fluid <0.2 g/dL        ________________________________________________________  :  Peritoneal  :       Pleural          :   Synovial     :  :______________:________________________:________________:  :              : Transudate :  Exudate  :                 :  :______________:____________:___________:________________:  :  Not Estab.  :   <3 g/dL  :  >3 g/dL  :    <2.5 g/dL   :  :______________:____________:___________:________________:   The method performance specifications have not been   established for this test in body fluid. The test result   should be integrated into the clinical context for   interpretation.  The method performance specifications have not been established for  this test in body fluid.  The test result should be integrated into  the clinical context for interpretation.  **Verified by repeat analysis**       Narrative:       Performed at:  [address] [place] [address]  [id number]  : Venkatesh Hunt PhD, Phone:  [phone]    Anaerobic Culture [[id number]]  (Normal) Collected:  08/01/17 1828    Specimen:  Aspirate from Hip, Right Updated:  08/03/17 1240     Culture No anaerobes isolated at 24 hours     Gram Stain Result No organisms seen    Body Fluid Culture In Blood Culture Bottles [[id number]]  (Normal) Collected:  08/01/17 1833    Specimen:  Body Fluid from Hip, Right Updated:  08/03/17 1901     BF Culture No growth at 2 days    Blood Culture [[id number]]  (Normal) Collected:  07/29/17 1906    Specimen:  Blood from Arm, Left Updated:  08/03/17 2001     Blood Culture No growth at 5 days    Blood Culture [[id number]]  (Normal) Collected:  07/29/17 2022    Specimen:  Blood from Arm, Left Updated:  08/03/17 2101     Blood Culture No growth at 5 days    CBC & Differential [[id number]] Collected:  08/04/17 0058    Specimen:  Blood Updated:  08/04/17 0207    Narrative:       The following orders were created for panel order CBC & Differential.  Procedure                               Abnormality         Status                     ---------                               -----------         ------                     Scan Slide[[id number]]                                                                  [facility] Auto  Differential[[id number]]        Abnormal            Final result                 Please view results for these tests on the individual orders.    CBC Auto Differential [[id number]]  (Abnormal) Collected:  08/04/17 0058    Specimen:  Blood Updated:  08/04/17 0207     WBC 13.91 (H) 10*3/mm3      RBC 3.68 (L) 10*6/mm3      Hemoglobin 9.9 (L) g/dL      Hematocrit 32.5 (L) %      MCV 88.3 fL      MCH 26.9 (L) pg      MCHC 30.5 (L) g/dL      RDW 14.4 %      RDW-SD 46.2 fl      MPV 9.7 fL      Platelets 606 (H) 10*3/mm3      Neutrophil % 63.4 %      Lymphocyte % 23.5 %      Monocyte % 4.8 %      Eosinophil % 5.6 (H) %      Basophil % 0.2 %      Immature Grans % 2.5 (H) %      Neutrophils, Absolute 8.81 (H) 10*3/mm3      Lymphocytes, Absolute 3.27 (H) 10*3/mm3      Monocytes, Absolute 0.67 10*3/mm3      Eosinophils, Absolute 0.78 (H) 10*3/mm3      Basophils, Absolute 0.03 10*3/mm3      Immature Grans, Absolute 0.35 (H) 10*3/mm3      nRBC 0.2 (H) /100 WBC     Comprehensive Metabolic Panel [[id number]]  (Abnormal) Collected:  08/04/17 0058    Specimen:  Blood Updated:  08/04/17 0219     Glucose 101 mg/dL      BUN 13 mg/dL      Creatinine 0.60 mg/dL      Sodium 136 mmol/L      Potassium 3.6 mmol/L      Chloride 108 mmol/L      CO2 24.0 (L) mmol/L      Calcium 9.4 mg/dL      Total Protein 8.4 (H) g/dL      Albumin 3.60 g/dL      ALT (SGPT) 30 U/L      AST (SGOT) 23 U/L      Alkaline Phosphatase 152 (H) U/L       Note New Reference Ranges        Total Bilirubin 0.4 mg/dL      eGFR Non African Amer 150 mL/min/1.73      Globulin 4.8 gm/dL      A/G Ratio 0.8 (L) g/dL      BUN/Creatinine Ratio 21.7     Anion Gap 4.0 mmol/L     Osmolality, Calculated [[id number]]  (Abnormal) Collected:  08/04/17 0058    Specimen:  Blood Updated:  08/04/17 0219     Osmolality Calc 272.2 (L) mOsm/kg     C-reactive Protein [[id number]]  (Abnormal) Collected:  08/04/17 0058    Specimen:  Blood Updated:  08/04/17 0222     C-Reactive Protein 9.43 (H) mg/dL            Operative/Procedure Notes (most recent note)     No notes of this type exist for this encounter.

## 2017-08-04 NOTE — NURSING NOTE
At 12:45 am the pt lost IV access while getting his last antibiotic scheduled for the night. My Lead nurse contacted , she stated it was ok to leave the pt with no access till morning pending he is scheduled for a PICC first thing in the morning.

## 2017-08-04 NOTE — PROGRESS NOTES
"  I have personally seen and examined the patient today and discussed overnight interval progress and pertinent issues with nursing staff.    Subjective    Patient is getting a PICC line today and possibly being discharged home soon.  Leukocytosis and CRP level is showing improvement.  Culture from right hip continues to show no growth.  Wound culture from 7/29/2017 finalized as MSSA, strep, and enterococcus.      Past Medical History:   Diagnosis Date   • Asthma    • Cancer     skin cancer on right arm   • Diabetes mellitus    • History of transfusion    • Hyperlipidemia    • Hypertension    • Paraplegia    • Sleep apnea        History reviewed. No pertinent family history.    Social History     Social History   • Marital status:      Spouse name: N/A   • Number of children: N/A   • Years of education: N/A     Occupational History   • Not on file.     Social History Main Topics   • Smoking status: Never Smoker   • Smokeless tobacco: Not on file   • Alcohol use Yes      Comment: occassional    • Drug use: Yes     Special: Marijuana   • Sexual activity: Defer     Other Topics Concern   • Not on file     Social History Narrative         ALLERGIES:    Allergies   Allergen Reactions   • Keflex [Cephalexin] Rash     Patient states \"red man syndrome\"         History taken from: patient chart RN      Vital Signs    /59 (BP Location: Right arm, Patient Position: Lying)  Pulse 58  Temp 97.9 °F (36.6 °C) (Oral)   Resp 18  Ht 71\" (180.3 cm)  Wt 269 lb 2 oz (122 kg)  SpO2 95%  BMI 37.54 kg/m2    Temp:  [97.9 °F (36.6 °C)-98.8 °F (37.1 °C)] 97.9 °F (36.6 °C)      Intake/Output Summary (Last 24 hours) at 08/04/17 0956  Last data filed at 08/04/17 0830   Gross per 24 hour   Intake             1940 ml   Output             3100 ml   Net            -1160 ml     Intake & Output (last 3 days)       08/01 0701 - 08/02 0700 08/02 0701 - 08/03 0700 08/03 0701 - 08/04 0700 08/04 0701 - 08/05 0700    P.O. 1440 1800 1440 " 360    IV Piggyback 600 500 500     Irrigation        Total Intake(mL/kg) 2040 (16.7) 2300 (18.8) 1940 (15.9) 360 (2.9)    Urine (mL/kg/hr) 4675 (1.6) 3100 (1.1) 3250 (1.1)     Stool 500 (0.2) 125 (0) 800 (0.3)     Total Output 5175 3225 4050      Net -0715 -925 -2110 +360                Physical Exam:       General Appearance:    More awake and alert cooperative, in no acute distress   Head:    Normocephalic, without obvious abnormality, atraumatic   Eyes:            Lids and lashes normal, conjunctivae and sclerae normal, no   icterus, no pallor, corneas clear, PERRLA   Ears:    Ears appear intact with no abnormalities noted   Throat:   No oral lesions, no thrush, oral mucosa moist   Neck:   No adenopathy, supple, trachea midline, no thyromegaly, no   carotid bruit, no JVD   Back:     No tenderness to percussion or palpation, range of motion   normal   Lungs:     Clear to auscultation,respirations regular, even and unlabored. No wheezing, no ronchi and no crackles.    Heart:    Regular rhythm and normal rate, normal S1 and S2, no            murmur, no gallop, no rub, no click   Chest Wall:    No abnormalities observed   Abdomen:     Positive for colostomy and urostomy Normal bowel sounds, no masses, no organomegaly, soft, non-tender, non-distended, no guarding, no rebound                tenderness   Rectal:     Deferred   Extremities:  Paraplegia    Pulses:   Pulses palpable and equal bilaterally   Skin:   Right buttocks ulcer with wound VAC in place and surrounding erythema as well as warmth.     Lymph nodes:   No palpable adenopathy   Neurologic:   Awake and alert. Following commands.Paraplegia              Results:      Results from last 7 days  Lab Units 08/04/17  0058 08/03/17  0101 08/02/17  0155 08/01/17  0058 07/31/17  0447 07/30/17  0111 07/28/17  2314   WBC 10*3/mm3 13.91* 15.02* 14.46* 13.54* 10.75 12.46 24.12*     Lab Results   Component Value Date    NEUTROABS 8.81 (H) 08/04/2017         Results from  last 7 days  Lab Units 08/04/17  0058   CREATININE mg/dL 0.60         Results from last 7 days  Lab Units 08/04/17  0058 08/03/17  0101 08/02/17  0155   CRP mg/dL 9.43* 12.67* 14.08*       Results Review:    I have personally reviewed laboratory data, culture results, radiology studies and antimicrobial therapy.    Hospital Medications (active)       Dose Frequency Start End    acetaminophen (TYLENOL) tablet 650 mg 650 mg Every 6 Hours PRN 7/29/2017     Sig - Route: Take 2 tablets by mouth Every 6 (Six) Hours As Needed for Mild Pain (1-3). - Oral    acetaminophen (TYLENOL) tablet 650 mg 650 mg Every 6 Hours PRN 7/29/2017     Sig - Route: Take 2 tablets by mouth Every 6 (Six) Hours As Needed for Mild Pain (1-3). - Oral    baclofen (LIORESAL) tablet 30 mg 30 mg Every 8 Hours PRN 7/29/2017     Sig - Route: Take 3 tablets by mouth Every 8 (Eight) Hours As Needed for Muscle Spasms. - Oral    castor oil-balsam peru (VENELEX) ointment  Every 12 Hours Scheduled 7/29/2017     Sig - Route: Apply  topically Every 12 (Twelve) Hours. - Topical    Cosign for Ordering: Required by Carlin Landers MD    dextrose (D50W) solution 25 g 25 g Every 15 Minutes PRN 7/29/2017     Sig - Route: Infuse 50 mL into a venous catheter Every 15 (Fifteen) Minutes As Needed for Low Blood Sugar (Blood Sugar Less Than 70, Patient Has IV Access - Unresponsive, NPO or Unable To Safely Swallow). - Intravenous    dextrose (D50W) solution 25 g 25 g Every 15 Minutes PRN 7/29/2017     Sig - Route: Infuse 50 mL into a venous catheter Every 15 (Fifteen) Minutes As Needed for Low Blood Sugar (Blood Sugar Less Than 70, Patient Has IV Access - Unresponsive, NPO or Unable To Safely Swallow). - Intravenous    dextrose (GLUTOSE) oral gel 15 g 15 g Every 15 Minutes PRN 7/29/2017     Sig - Route: Take 15 g by mouth Every 15 (Fifteen) Minutes As Needed for Low Blood Sugar (Blood Sugar Less Than 70, Patient Alert, Is Not NPO & Can Safely Swallow). - Oral     dextrose (GLUTOSE) oral gel 15 g 15 g Every 15 Minutes PRN 7/29/2017     Sig - Route: Take 15 g by mouth Every 15 (Fifteen) Minutes As Needed for Low Blood Sugar (Blood Sugar Less Than 70, Patient Alert, Is Not NPO & Can Safely Swallow). - Oral    DULoxetine (CYMBALTA) DR capsule 60 mg 60 mg 2 Times Daily 7/29/2017     Sig - Route: Take 1 capsule by mouth 2 (Two) Times a Day. - Oral    gabapentin (NEURONTIN) capsule 400 mg 400 mg Every 8 Hours Scheduled 7/29/2017     Sig - Route: Take 1 capsule by mouth Every 8 (Eight) Hours. - Oral    glucagon (human recombinant) (GLUCAGEN DIAGNOSTIC) injection 1 mg 1 mg Every 15 Minutes PRN 7/29/2017     Sig - Route: Inject 1 mg under the skin Every 15 (Fifteen) Minutes As Needed (Blood Glucose Less Than 70 - Patient Without IV Access - Unresponsive, NPO or Unable To Safely Swallow). - Subcutaneous    glucagon (human recombinant) (GLUCAGEN DIAGNOSTIC) injection 1 mg 1 mg Every 15 Minutes PRN 7/29/2017     Sig - Route: Inject 1 mg under the skin Every 15 (Fifteen) Minutes As Needed (Blood Glucose Less Than 70 - Patient Without IV Access - Unresponsive, NPO or Unable To Safely Swallow). - Subcutaneous    heparin (porcine) 5000 UNIT/ML injection 5,000 Units 5,000 Units Every 8 Hours Scheduled 7/30/2017     Sig - Route: Inject 1 mL under the skin Every 8 (Eight) Hours. - Subcutaneous    ibuprofen (ADVIL,MOTRIN) tablet 600 mg 600 mg 3 Times Daily PRN 7/29/2017     Sig - Route: Take 1 tablet by mouth 3 (Three) Times a Day As Needed for Mild Pain (1-3). - Oral    insulin aspart (novoLOG) injection 0-7 Units 0-7 Units 4 Times Daily Before Meals & Nightly 7/29/2017     Sig - Route: Inject 0-7 Units under the skin 4 (Four) Times a Day Before Meals & at Bedtime. - Subcutaneous    levoFLOXacin (LEVAQUIN) 500 mg/100 mL D5W (premix) (LEVAQUIN) 500 mg 500 mg Every 24 Hours 7/31/2017     Sig - Route: Infuse 100 mL into a venous catheter Daily. - Intravenous    Magnesium Sulfate 2 gram Bolus,  "followed by 8 gram infusion (total Mg dose 10 grams)- Mg less than or equal to 1mg/dL 2 g As Needed 7/29/2017     Sig - Route: Infuse 50 mL into a venous catheter As Needed (Mg less than or equal to 1mg/dL). - Intravenous    Linked Group 1:  \"Or\" Linked Group Details        magnesium sulfate 4 gram infusion- Mg 1.6-1.9 mg/dL 4 g As Needed 7/29/2017     Sig - Route: Infuse 100 mL into a venous catheter As Needed (Mg 1.6-1.9 mg/dL). - Intravenous    Linked Group 1:  \"Or\" Linked Group Details        magnesium sulfate 4 gram infusion- Mg 1.6-1.9 mg/dL 4 g Once 7/31/2017     Sig - Route: Infuse 100 mL into a venous catheter 1 (One) Time. - Intravenous    Magnesium Sulfate 6 gram Infusion (2 gm x 3) -Mg 1.1 -1.5 mg/dL 2 g As Needed 7/29/2017     Sig - Route: Infuse 50 mL into a venous catheter As Needed (Mg 1.1 -1.5 mg/dL). - Intravenous    Linked Group 1:  \"Or\" Linked Group Details        menthol-zinc oxide (CALMOSEPTINE) 0.44-20.625 % ointment  3 Times Daily PRN 7/29/2017     Sig - Route: Apply  topically 3 (Three) Times a Day As Needed (bed sores, crotch, and bottom of stomach). - Topical    methenamine (HIPREX) tablet 1 g 1 g Every 12 Hours Scheduled 7/29/2017     Sig - Route: Take 1 tablet by mouth Every 12 (Twelve) Hours. - Oral    modafinil (PROVIGIL) tablet 200 mg 200 mg Daily 7/29/2017     Sig - Route: Take 2 tablets by mouth Daily. - Oral    multivitamin (DAILY SARAH) tablet 1 tablet 1 tablet Daily 7/30/2017     Sig - Route: Take 1 tablet by mouth Daily. - Oral    nystatin (MYCOSTATIN) powder  Every 12 Hours Scheduled 7/30/2017     Sig - Route: Apply  topically Every 12 (Twelve) Hours. - Topical    pantoprazole (PROTONIX) EC tablet 40 mg 40 mg Every Early Morning 7/29/2017     Sig - Route: Take 1 tablet by mouth Every Morning. - Oral    potassium chloride (KLOR-CON) packet 40 mEq 40 mEq As Needed 7/29/2017     Sig - Route: Take 40 mEq by mouth As Needed (potassium replacement, see admin instructions). - Oral    " "Linked Group 2:  \"Or\" Linked Group Details        potassium chloride (MICRO-K) CR capsule 40 mEq 40 mEq As Needed 7/29/2017     Sig - Route: Take 4 capsules by mouth As Needed (potassium replacement.  see admin instructions). - Oral    Linked Group 2:  \"Or\" Linked Group Details        potassium chloride 10 mEq in 100 mL IVPB 10 mEq Every 1 Hour PRN 7/29/2017     Sig - Route: Infuse 100 mL into a venous catheter Every 1 (One) Hour As Needed (potassium protocol PERIPHERAL - see admin instructions). - Intravenous    Linked Group 2:  \"Or\" Linked Group Details        potassium chloride 10 mEq in 100 mL IVPB 10 mEq Every 1 Hour 7/31/2017 7/31/2017    Sig - Route: Infuse 100 mL into a venous catheter Every 1 (One) Hour. - Intravenous    potassium phosphate 15 mmol in sodium chloride 0.9 % 100 mL infusion 15 mmol As Needed 7/29/2017     Sig - Route: Infuse 15 mmol into a venous catheter As Needed (Peripheral IV - Phosphorus 1.25 - 2.1). - Intravenous    Linked Group 3:  \"Or\" Linked Group Details        potassium phosphate 30 mmol in sodium chloride 0.9 % 250 mL infusion 30 mmol As Needed 7/29/2017     Sig - Route: Infuse 30 mmol into a venous catheter As Needed (Peripheral IV - Phosphorus 0.9 - 1.24). - Intravenous    Linked Group 3:  \"Or\" Linked Group Details        potassium phosphate 45 mmol in sodium chloride 0.9 % 500 mL infusion 45 mmol As Needed 7/29/2017     Sig - Route: Infuse 45 mmol into a venous catheter As Needed (Peripheral IV - Phosphorus Less Than 0.9). - Intravenous    Linked Group 3:  \"Or\" Linked Group Details        promethazine (PHENERGAN) 12.5 mg in sodium chloride 0.9 % 50 mL 12.5 mg Every 6 Hours PRN 7/29/2017     Sig - Route: Infuse 12.5 mg into a venous catheter Every 6 (Six) Hours As Needed for Nausea or Vomiting. - Intravenous    Linked Group 4:  \"Or\" Linked Group Details        promethazine (PHENERGAN) tablet 25 mg 25 mg Every 6 Hours PRN 7/29/2017     Sig - Route: Take 1 tablet by mouth Every 6 " "(Six) Hours As Needed for Nausea or Vomiting. - Oral    Linked Group 4:  \"Or\" Linked Group Details        sodium chloride 0.9 % flush 1-10 mL 1-10 mL As Needed 7/29/2017     Sig - Route: Infuse 1-10 mL into a venous catheter As Needed for Line Care. - Intravenous    sodium chloride 0.9 % flush 10 mL 10 mL As Needed 7/28/2017     Sig - Route: Infuse 10 mL into a venous catheter As Needed for Line Care. - Intravenous    Cosign for Ordering: Accepted by Gema Matta MD on 7/29/2017 12:09 AM    Linked Group 5:  \"And\" Linked Group Details        sodium chloride 0.9 % infusion 75 mL/hr Continuous 7/29/2017     Sig - Route: Infuse 75 mL/hr into a venous catheter Continuous. - Intravenous    sodium hypochlorite (DAKIN'S) topical solution 0.25% (half strength)  2 Times Daily 7/29/2017     Sig - Route: Irrigate with  as directed 2 (Two) Times a Day. - Irrigation    Cosign for Ordering: Accepted by Sung Blair MD on 7/30/2017  9:37 AM    sodium phosphates 15 mmol in sodium chloride 0.9 % 250 mL IVPB 15 mmol As Needed 7/29/2017     Sig - Route: Infuse 15 mmol into a venous catheter As Needed (Peripheral IV - Phosphorus 1.25 - 2.1 & Potassium Greater Than 4). - Intravenous    Linked Group 3:  \"Or\" Linked Group Details        sodium phosphates 30 mmol in sodium chloride 0.9 % 250 mL IVPB 30 mmol As Needed 7/29/2017     Sig - Route: Infuse 30 mmol into a venous catheter As Needed (Peripheral IV - Phosphorus 0.9 - 1.24 & Potassium Greater Than 4). - Intravenous    Linked Group 3:  \"Or\" Linked Group Details        sodium phosphates 45 mmol in sodium chloride 0.9 % 250 mL IVPB 45 mmol As Needed 7/29/2017     Sig - Route: Infuse 45 mmol into a venous catheter As Needed (Peripheral IV - Phosphorus Less Than 0.9 & Potassium Greater Than 4). - Intravenous    Linked Group 3:  \"Or\" Linked Group Details        sucralfate (CARAFATE) tablet 1 g 1 g 4 Times Daily 7/29/2017     Sig - Route: Take 1 tablet by mouth 4 (Four) " Times a Day. - Oral    topiramate (TOPAMAX) tablet 100 mg 100 mg 3 Times Daily 7/29/2017     Sig - Route: Take 1 tablet by mouth 3 (Three) Times a Day. - Oral    traZODone (DESYREL) tablet 50 mg 50 mg Nightly PRN 7/29/2017     Sig - Route: Take 1 tablet by mouth At Night As Needed for Sleep. - Oral    vancomycin (VANCOCIN) 2,000 mg in sodium chloride 0.9 % 500 mL IVPB 2,000 mg Every 12 Hours 7/29/2017     Sig - Route: Infuse 2,000 mg into a venous catheter Every 12 (Twelve) Hours. - Intravenous    vitamin C (ASCORBIC ACID) tablet 250 mg 250 mg 2 Times Daily 7/29/2017     Sig - Route: Take 0.5 tablets by mouth 2 (Two) Times a Day. - Oral    vitamin D3 capsule 5,000 Units 5,000 Units Daily 7/29/2017     Sig - Route: Take 1 capsule by mouth Daily. - Oral    meropenem (MERREM) 1 g/100 mL 0.9% NS VTB (mbp) (Discontinued) 1 g Every 8 Hours 7/29/2017 7/31/2017    Sig - Route: Infuse 100 mL into a venous catheter Every 8 (Eight) Hours. - Intravenous            Cultures:    Blood Culture   Date Value Ref Range Status   07/29/2017 No growth at 24 hours  Preliminary   07/29/2017 No growth at 24 hours  Preliminary   07/28/2017 Gram positive cocci, consistent with Staph spp (A)  Preliminary   07/28/2017 No growth at 2 days  Preliminary     Urine Culture   Date Value Ref Range Status   07/29/2017 >100,000 CFU/mL Escherichia coli (A)  Final     Wound Culture   Date Value Ref Range Status   07/29/2017 Culture in progress  Preliminary       PROBLEM LIST:    Patient Active Problem List   Diagnosis   • Infected decubitus ulcer   • Decubitus skin ulcer   • Sepsis       Assessment/Plan     ASSESSMENT:    1.  Severe sepsis  2.  Acute osteomyelitis  3.  UTI     PLAN:    Patient is getting a PICC line today and possibly being discharged home soon.  Leukocytosis and CRP level is showing improvement.  Culture from right hip continues to show no growth.  Wound culture from 7/29/2017 finalized as MSSA, strep, and enterococcus.  Could switch IV  Levaquin to PO at any time and recommend Levaquin and IV vancomycin to continue both through 9/8/2017 with very close orthopedic follow-up.    Patients findings and recommendations were discussed with patient and nursing staff    Code Status: Full Code       Susan Royal PA-C  08/04/17  9:56 AM

## 2017-08-04 NOTE — PLAN OF CARE
Problem: Skin Integrity Impairment, Risk/Actual (Adult)  Goal: Identify Related Risk Factors and Signs and Symptoms  Outcome: Ongoing (interventions implemented as appropriate)    08/03/17 1444   Skin Integrity Impairment, Risk/Actual   Skin Integrity Impairment, Risk/Actual: Related Risk Factors moisture;immobility   Signs and Symptoms (Skin Integrity Impairment) erythema nonblanchable       Goal: Skin Integrity/Wound Healing  Outcome: Ongoing (interventions implemented as appropriate)    08/04/17 0050   Skin Integrity Impairment, Risk/Actual (Adult)   Skin Integrity/Wound Healing making progress toward outcome         Problem: Pressure Ulcer (Adult)  Goal: Signs and Symptoms of Listed Potential Problems Will be Absent or Manageable (Pressure Ulcer)  Outcome: Ongoing (interventions implemented as appropriate)    08/03/17 1444   Pressure Ulcer   Problems Assessed (Pressure Ulcer) wound complications;infection   Problems Present (Pressure Ulcer) wound complications;infection         Problem: Pressure Ulcer Risk (Alejandro Scale) (Adult,Obstetrics,Pediatric)  Goal: Identify Related Risk Factors and Signs and Symptoms  Outcome: Ongoing (interventions implemented as appropriate)    07/31/17 0245   Pressure Ulcer Risk (Alejandro Scale)   Related Risk Factors (Pressure Ulcer Risk (Alejandro Scale)) body weight extremes;mobility impaired       Goal: Skin Integrity  Outcome: Ongoing (interventions implemented as appropriate)    08/04/17 0050   Pressure Ulcer Risk (Alejandro Scale) (Adult,Obstetrics,Pediatric)   Skin Integrity making progress toward outcome

## 2017-08-06 LAB
BACTERIA FLD CULT: NORMAL
BACTERIA SPEC ANAEROBE CULT: NORMAL
GRAM STN SPEC: NORMAL

## 2017-08-29 ENCOUNTER — OFFICE VISIT (OUTPATIENT)
Dept: ORTHOPEDIC SURGERY | Facility: CLINIC | Age: 42
End: 2017-08-29

## 2017-08-29 DIAGNOSIS — L89.309 DECUBITUS ULCER OF BUTTOCK, UNSPECIFIED LATERALITY, UNSPECIFIED PRESSURE ULCER STAGE: Primary | ICD-10-CM

## 2017-08-29 PROCEDURE — 99213 OFFICE O/P EST LOW 20 MIN: CPT | Performed by: ORTHOPAEDIC SURGERY

## 2017-08-29 RX ORDER — LISINOPRIL 10 MG/1
TABLET ORAL
COMMUNITY
Start: 2017-08-28 | End: 2017-09-21

## 2017-08-29 RX ORDER — ARIPIPRAZOLE 10 MG/1
10 TABLET ORAL NIGHTLY
COMMUNITY
Start: 2017-08-28

## 2017-08-29 RX ORDER — FENOFIBRATE 145 MG/1
145 TABLET, COATED ORAL DAILY
COMMUNITY
Start: 2017-08-28 | End: 2017-12-18 | Stop reason: HOSPADM

## 2017-08-29 RX ORDER — NYSTATIN 100000 [USP'U]/G
1 POWDER TOPICAL 3 TIMES DAILY
Status: ON HOLD | COMMUNITY
Start: 2017-08-28 | End: 2019-10-19

## 2017-08-29 RX ORDER — NYSTATIN 100000 U/G
1 CREAM TOPICAL 3 TIMES DAILY
COMMUNITY
Start: 2017-08-28 | End: 2017-12-18 | Stop reason: HOSPADM

## 2017-08-29 RX ORDER — METRONIDAZOLE 500 MG/1
TABLET ORAL
COMMUNITY
Start: 2017-06-15 | End: 2017-09-21

## 2017-08-29 RX ORDER — MODAFINIL 200 MG/1
200 TABLET ORAL DAILY
COMMUNITY
Start: 2017-08-24 | End: 2017-12-18 | Stop reason: HOSPADM

## 2017-08-29 RX ORDER — RANITIDINE 150 MG/1
150 TABLET ORAL 2 TIMES DAILY
COMMUNITY
Start: 2017-08-28 | End: 2017-12-18 | Stop reason: HOSPADM

## 2017-09-12 ENCOUNTER — OFFICE VISIT (OUTPATIENT)
Dept: SURGERY | Facility: CLINIC | Age: 42
End: 2017-09-12

## 2017-09-12 VITALS
HEART RATE: 64 BPM | DIASTOLIC BLOOD PRESSURE: 82 MMHG | WEIGHT: 269 LBS | HEIGHT: 71 IN | SYSTOLIC BLOOD PRESSURE: 136 MMHG | BODY MASS INDEX: 37.66 KG/M2

## 2017-09-12 DIAGNOSIS — L89.94: Primary | ICD-10-CM

## 2017-09-12 PROCEDURE — 99212 OFFICE O/P EST SF 10 MIN: CPT | Performed by: SURGERY

## 2017-09-13 NOTE — PROGRESS NOTES
Subjective   Ken Krueger is a 41 y.o. male  is here today for follow-up.         Ken Krueger is a 41 y.o. male here for follow up for chronic decubitus ulceration.  The patient has a right buttock wound with a wound VAC that is healing well.  The wound VAC is intact at this time.  His sacral decubitus ulcer is also stable.  The large left buttock gluteal pressure ulceration is open and being packed.  The wound base is clean with some mild proteinaceous debris.  No evidence of infection or bone exposure.  This is adequate for wound VAC placement.  No fevers systemic symptoms no new issues.          Ken was seen today for pressure ulcers on both hips.    Diagnoses and all orders for this visit:    Decubitus skin ulcer, stage IV        Assessment     Ken Krueger is a 41 y.o. male with multiple decubitus ulcers secondary to paralysis.  The patient has a large left gluteal ulcer that is granulating well with some proteinaceous debris.  I feel this is appropriate for wound VAC placement was to be performed by home health upon discharge from the office today.  He will continue wound VAC therapy and current treatment for his other ulcers.  He will follow up in 1 month.

## 2017-09-21 ENCOUNTER — HOSPITAL ENCOUNTER (INPATIENT)
Facility: HOSPITAL | Age: 42
LOS: 9 days | Discharge: HOME HEALTH | End: 2017-09-30
Attending: EMERGENCY MEDICINE | Admitting: INTERNAL MEDICINE

## 2017-09-21 ENCOUNTER — APPOINTMENT (OUTPATIENT)
Dept: GENERAL RADIOLOGY | Facility: HOSPITAL | Age: 42
End: 2017-09-21

## 2017-09-21 DIAGNOSIS — L89.90 DECUBITUS SKIN ULCER, UNSPECIFIED PRESSURE ULCER STAGE: Primary | ICD-10-CM

## 2017-09-21 LAB
ALBUMIN SERPL-MCNC: 4.1 G/DL (ref 3.5–5)
ALBUMIN/GLOB SERPL: 1 G/DL (ref 1.5–2.5)
ALP SERPL-CCNC: 127 U/L (ref 40–129)
ALT SERPL W P-5'-P-CCNC: 33 U/L (ref 10–44)
AMORPH URATE CRY URNS QL MICRO: ABNORMAL /HPF
ANION GAP SERPL CALCULATED.3IONS-SCNC: 10.4 MMOL/L (ref 3.6–11.2)
AST SERPL-CCNC: 31 U/L (ref 10–34)
BACTERIA UR QL AUTO: ABNORMAL /HPF
BASOPHILS # BLD AUTO: 0.02 10*3/MM3 (ref 0–0.3)
BASOPHILS NFR BLD AUTO: 0.1 % (ref 0–2)
BILIRUB SERPL-MCNC: 0.6 MG/DL (ref 0.2–1.8)
BILIRUB UR QL STRIP: ABNORMAL
BUN BLD-MCNC: 15 MG/DL (ref 7–21)
BUN/CREAT SERPL: 16.1 (ref 7–25)
CALCIUM SPEC-SCNC: 10.1 MG/DL (ref 7.7–10)
CHLORIDE SERPL-SCNC: 104 MMOL/L (ref 99–112)
CLARITY UR: ABNORMAL
CO2 SERPL-SCNC: 19.6 MMOL/L (ref 24.3–31.9)
COLOR UR: ABNORMAL
CREAT BLD-MCNC: 0.93 MG/DL (ref 0.43–1.29)
CRP SERPL-MCNC: 27.8 MG/DL (ref 0–0.99)
D-LACTATE SERPL-SCNC: 1.6 MMOL/L (ref 0.5–2)
DEPRECATED RDW RBC AUTO: 44.9 FL (ref 37–54)
EOSINOPHIL # BLD AUTO: 0.38 10*3/MM3 (ref 0–0.7)
EOSINOPHIL NFR BLD AUTO: 2 % (ref 0–5)
ERYTHROCYTE [DISTWIDTH] IN BLOOD BY AUTOMATED COUNT: 14.6 % (ref 11.5–14.5)
GFR SERPL CREATININE-BSD FRML MDRD: 90 ML/MIN/1.73
GLOBULIN UR ELPH-MCNC: 4.2 GM/DL
GLUCOSE BLD-MCNC: 120 MG/DL (ref 70–110)
GLUCOSE BLDC GLUCOMTR-MCNC: 120 MG/DL (ref 70–130)
GLUCOSE UR STRIP-MCNC: NEGATIVE MG/DL
HCT VFR BLD AUTO: 34.4 % (ref 42–52)
HGB BLD-MCNC: 10.3 G/DL (ref 14–18)
HGB UR QL STRIP.AUTO: ABNORMAL
HOLD SPECIMEN: NORMAL
HOLD SPECIMEN: NORMAL
HYALINE CASTS UR QL AUTO: ABNORMAL /LPF
IMM GRANULOCYTES # BLD: 0.06 10*3/MM3 (ref 0–0.03)
IMM GRANULOCYTES NFR BLD: 0.3 % (ref 0–0.5)
KETONES UR QL STRIP: NEGATIVE
LEUKOCYTE ESTERASE UR QL STRIP.AUTO: ABNORMAL
LYMPHOCYTES # BLD AUTO: 1.26 10*3/MM3 (ref 1–3)
LYMPHOCYTES NFR BLD AUTO: 6.5 % (ref 21–51)
MAGNESIUM SERPL-MCNC: 1.7 MG/DL (ref 1.7–2.6)
MCH RBC QN AUTO: 25.5 PG (ref 27–33)
MCHC RBC AUTO-ENTMCNC: 29.9 G/DL (ref 33–37)
MCV RBC AUTO: 85.1 FL (ref 80–94)
MONOCYTES # BLD AUTO: 0.92 10*3/MM3 (ref 0.1–0.9)
MONOCYTES NFR BLD AUTO: 4.7 % (ref 0–10)
NEUTROPHILS # BLD AUTO: 16.83 10*3/MM3 (ref 1.4–6.5)
NEUTROPHILS NFR BLD AUTO: 86.4 % (ref 30–70)
NITRITE UR QL STRIP: NEGATIVE
OSMOLALITY SERPL CALC.SUM OF ELEC: 270.3 MOSM/KG (ref 273–305)
PH UR STRIP.AUTO: 6 [PH] (ref 5–8)
PLATELET # BLD AUTO: 591 10*3/MM3 (ref 130–400)
PMV BLD AUTO: 9.5 FL (ref 6–10)
POTASSIUM BLD-SCNC: 3.7 MMOL/L (ref 3.5–5.3)
PROT SERPL-MCNC: 8.3 G/DL (ref 6–8)
PROT UR QL STRIP: ABNORMAL
RBC # BLD AUTO: 4.04 10*6/MM3 (ref 4.7–6.1)
RBC # UR: ABNORMAL /HPF
REF LAB TEST METHOD: ABNORMAL
SODIUM BLD-SCNC: 134 MMOL/L (ref 135–153)
SP GR UR STRIP: 1.02 (ref 1–1.03)
SQUAMOUS #/AREA URNS HPF: ABNORMAL /HPF
UROBILINOGEN UR QL STRIP: ABNORMAL
WBC NRBC COR # BLD: 19.47 10*3/MM3 (ref 4.5–12.5)
WBC UR QL AUTO: ABNORMAL /HPF
WHOLE BLOOD HOLD SPECIMEN: NORMAL
WHOLE BLOOD HOLD SPECIMEN: NORMAL
YEAST URNS QL MICRO: ABNORMAL /HPF

## 2017-09-21 PROCEDURE — 83735 ASSAY OF MAGNESIUM: CPT | Performed by: HOSPITALIST

## 2017-09-21 PROCEDURE — 83605 ASSAY OF LACTIC ACID: CPT | Performed by: EMERGENCY MEDICINE

## 2017-09-21 PROCEDURE — 87046 STOOL CULTR AEROBIC BACT EA: CPT | Performed by: PHYSICIAN ASSISTANT

## 2017-09-21 PROCEDURE — 87493 C DIFF AMPLIFIED PROBE: CPT | Performed by: PHYSICIAN ASSISTANT

## 2017-09-21 PROCEDURE — 71010 XR CHEST 1 VW: CPT | Performed by: RADIOLOGY

## 2017-09-21 PROCEDURE — 87086 URINE CULTURE/COLONY COUNT: CPT | Performed by: EMERGENCY MEDICINE

## 2017-09-21 PROCEDURE — 93005 ELECTROCARDIOGRAM TRACING: CPT | Performed by: PHYSICIAN ASSISTANT

## 2017-09-21 PROCEDURE — 99223 1ST HOSP IP/OBS HIGH 75: CPT | Performed by: HOSPITALIST

## 2017-09-21 PROCEDURE — 81001 URINALYSIS AUTO W/SCOPE: CPT | Performed by: EMERGENCY MEDICINE

## 2017-09-21 PROCEDURE — 87899 AGENT NOS ASSAY W/OPTIC: CPT | Performed by: PHYSICIAN ASSISTANT

## 2017-09-21 PROCEDURE — 80053 COMPREHEN METABOLIC PANEL: CPT | Performed by: EMERGENCY MEDICINE

## 2017-09-21 PROCEDURE — 25010000002 VANCOMYCIN PER 500 MG: Performed by: PHYSICIAN ASSISTANT

## 2017-09-21 PROCEDURE — 87040 BLOOD CULTURE FOR BACTERIA: CPT | Performed by: EMERGENCY MEDICINE

## 2017-09-21 PROCEDURE — 99284 EMERGENCY DEPT VISIT MOD MDM: CPT

## 2017-09-21 PROCEDURE — 71010 HC CHEST PA OR AP: CPT

## 2017-09-21 PROCEDURE — 25010000002 PIPERACILLIN-TAZOBACTAM: Performed by: PHYSICIAN ASSISTANT

## 2017-09-21 PROCEDURE — 86140 C-REACTIVE PROTEIN: CPT | Performed by: PHYSICIAN ASSISTANT

## 2017-09-21 PROCEDURE — 85025 COMPLETE CBC W/AUTO DIFF WBC: CPT | Performed by: EMERGENCY MEDICINE

## 2017-09-21 PROCEDURE — 82962 GLUCOSE BLOOD TEST: CPT

## 2017-09-21 PROCEDURE — 87045 FECES CULTURE AEROBIC BACT: CPT | Performed by: PHYSICIAN ASSISTANT

## 2017-09-21 PROCEDURE — 25010000002 PIPERACILLIN-TAZOBACTAM

## 2017-09-21 PROCEDURE — 93010 ELECTROCARDIOGRAM REPORT: CPT | Performed by: INTERNAL MEDICINE

## 2017-09-21 RX ORDER — BACLOFEN 10 MG/1
20 TABLET ORAL EVERY 6 HOURS PRN
Status: DISCONTINUED | OUTPATIENT
Start: 2017-09-21 | End: 2017-09-30 | Stop reason: HOSPADM

## 2017-09-21 RX ORDER — DULOXETIN HYDROCHLORIDE 60 MG/1
60 CAPSULE, DELAYED RELEASE ORAL 2 TIMES DAILY
Status: CANCELLED | OUTPATIENT
Start: 2017-09-21

## 2017-09-21 RX ORDER — BACLOFEN 10 MG/1
30 TABLET ORAL 4 TIMES DAILY
Status: CANCELLED | OUTPATIENT
Start: 2017-09-21

## 2017-09-21 RX ORDER — PANTOPRAZOLE SODIUM 40 MG/1
40 TABLET, DELAYED RELEASE ORAL EVERY MORNING
Status: CANCELLED | OUTPATIENT
Start: 2017-09-22

## 2017-09-21 RX ORDER — TRAZODONE HYDROCHLORIDE 50 MG/1
25 TABLET ORAL NIGHTLY PRN
Status: DISCONTINUED | OUTPATIENT
Start: 2017-09-21 | End: 2017-09-30 | Stop reason: HOSPADM

## 2017-09-21 RX ORDER — MAGNESIUM SULFATE HEPTAHYDRATE 40 MG/ML
2 INJECTION, SOLUTION INTRAVENOUS AS NEEDED
Status: DISCONTINUED | OUTPATIENT
Start: 2017-09-21 | End: 2017-09-30 | Stop reason: HOSPADM

## 2017-09-21 RX ORDER — POTASSIUM CHLORIDE 1.5 G/1.77G
40 POWDER, FOR SOLUTION ORAL AS NEEDED
Status: DISCONTINUED | OUTPATIENT
Start: 2017-09-21 | End: 2017-09-30 | Stop reason: HOSPADM

## 2017-09-21 RX ORDER — NYSTATIN 100000 [USP'U]/G
1 POWDER TOPICAL AS NEEDED
Status: DISCONTINUED | OUTPATIENT
Start: 2017-09-21 | End: 2017-09-30 | Stop reason: HOSPADM

## 2017-09-21 RX ORDER — MAGNESIUM SULFATE HEPTAHYDRATE 40 MG/ML
2 INJECTION, SOLUTION INTRAVENOUS ONCE
Status: COMPLETED | OUTPATIENT
Start: 2017-09-22 | End: 2017-09-22

## 2017-09-21 RX ORDER — MULTIVITAMIN
1 TABLET ORAL DAILY
Status: CANCELLED | OUTPATIENT
Start: 2017-09-21

## 2017-09-21 RX ORDER — MODAFINIL 100 MG/1
200 TABLET ORAL DAILY
Status: DISCONTINUED | OUTPATIENT
Start: 2017-09-22 | End: 2017-09-30 | Stop reason: HOSPADM

## 2017-09-21 RX ORDER — TOPIRAMATE 100 MG/1
100 TABLET, FILM COATED ORAL 3 TIMES DAILY
Status: CANCELLED | OUTPATIENT
Start: 2017-09-21

## 2017-09-21 RX ORDER — ARIPIPRAZOLE 10 MG/1
10 TABLET ORAL NIGHTLY
Status: DISCONTINUED | OUTPATIENT
Start: 2017-09-21 | End: 2017-09-30 | Stop reason: HOSPADM

## 2017-09-21 RX ORDER — ALBUTEROL SULFATE 2.5 MG/3ML
2.5 SOLUTION RESPIRATORY (INHALATION) EVERY 6 HOURS PRN
Status: DISCONTINUED | OUTPATIENT
Start: 2017-09-21 | End: 2017-09-30 | Stop reason: HOSPADM

## 2017-09-21 RX ORDER — SODIUM CHLORIDE 9 MG/ML
125 INJECTION, SOLUTION INTRAVENOUS CONTINUOUS
Status: DISCONTINUED | OUTPATIENT
Start: 2017-09-21 | End: 2017-09-21

## 2017-09-21 RX ORDER — NYSTATIN 100000 [USP'U]/G
1 POWDER TOPICAL AS NEEDED
Status: CANCELLED | OUTPATIENT
Start: 2017-09-21

## 2017-09-21 RX ORDER — MODAFINIL 100 MG/1
200 TABLET ORAL DAILY
Status: CANCELLED | OUTPATIENT
Start: 2017-09-21

## 2017-09-21 RX ORDER — SUCRALFATE 1 G/1
1 TABLET ORAL NIGHTLY
Status: DISCONTINUED | OUTPATIENT
Start: 2017-09-21 | End: 2017-09-30 | Stop reason: HOSPADM

## 2017-09-21 RX ORDER — MENTHOL AND ZINC OXIDE .44; 20.625 G/100G; G/100G
OINTMENT TOPICAL 3 TIMES DAILY PRN
Status: DISCONTINUED | OUTPATIENT
Start: 2017-09-21 | End: 2017-09-30 | Stop reason: HOSPADM

## 2017-09-21 RX ORDER — NYSTATIN 100000 U/G
1 CREAM TOPICAL 3 TIMES DAILY PRN
Status: DISCONTINUED | OUTPATIENT
Start: 2017-09-21 | End: 2017-09-30 | Stop reason: HOSPADM

## 2017-09-21 RX ORDER — NYSTATIN 100000 U/G
1 CREAM TOPICAL 3 TIMES DAILY PRN
Status: CANCELLED | OUTPATIENT
Start: 2017-09-21

## 2017-09-21 RX ORDER — SODIUM CHLORIDE 9 MG/ML
75 INJECTION, SOLUTION INTRAVENOUS CONTINUOUS
Status: DISCONTINUED | OUTPATIENT
Start: 2017-09-21 | End: 2017-09-23

## 2017-09-21 RX ORDER — ALBUTEROL SULFATE 90 UG/1
2 AEROSOL, METERED RESPIRATORY (INHALATION) EVERY 6 HOURS PRN
Status: CANCELLED | OUTPATIENT
Start: 2017-09-21

## 2017-09-21 RX ORDER — TOPIRAMATE 100 MG/1
100 TABLET, FILM COATED ORAL 3 TIMES DAILY
Status: DISCONTINUED | OUTPATIENT
Start: 2017-09-21 | End: 2017-09-30 | Stop reason: HOSPADM

## 2017-09-21 RX ORDER — PROMETHAZINE HYDROCHLORIDE 12.5 MG/1
6.25 TABLET ORAL EVERY 6 HOURS PRN
Status: DISCONTINUED | OUTPATIENT
Start: 2017-09-21 | End: 2017-09-30 | Stop reason: HOSPADM

## 2017-09-21 RX ORDER — GABAPENTIN 400 MG/1
800 CAPSULE ORAL EVERY 8 HOURS SCHEDULED
Status: DISCONTINUED | OUTPATIENT
Start: 2017-09-21 | End: 2017-09-30 | Stop reason: HOSPADM

## 2017-09-21 RX ORDER — SODIUM CHLORIDE 0.9 % (FLUSH) 0.9 %
10 SYRINGE (ML) INJECTION AS NEEDED
Status: DISCONTINUED | OUTPATIENT
Start: 2017-09-21 | End: 2017-09-30 | Stop reason: HOSPADM

## 2017-09-21 RX ORDER — SODIUM CHLORIDE 0.9 % (FLUSH) 0.9 %
1-10 SYRINGE (ML) INJECTION AS NEEDED
Status: DISCONTINUED | OUTPATIENT
Start: 2017-09-21 | End: 2017-09-30 | Stop reason: HOSPADM

## 2017-09-21 RX ORDER — NITROGLYCERIN 0.4 MG/1
0.4 TABLET SUBLINGUAL
Status: DISCONTINUED | OUTPATIENT
Start: 2017-09-21 | End: 2017-09-30 | Stop reason: HOSPADM

## 2017-09-21 RX ORDER — PANTOPRAZOLE SODIUM 40 MG/1
40 TABLET, DELAYED RELEASE ORAL EVERY MORNING
Status: DISCONTINUED | OUTPATIENT
Start: 2017-09-22 | End: 2017-09-30 | Stop reason: HOSPADM

## 2017-09-21 RX ORDER — TRAZODONE HYDROCHLORIDE 50 MG/1
50 TABLET ORAL NIGHTLY
Status: CANCELLED | OUTPATIENT
Start: 2017-09-21

## 2017-09-21 RX ORDER — ARIPIPRAZOLE 10 MG/1
10 TABLET ORAL NIGHTLY
Status: CANCELLED | OUTPATIENT
Start: 2017-09-21

## 2017-09-21 RX ORDER — FAMOTIDINE 20 MG/1
20 TABLET, FILM COATED ORAL EVERY 12 HOURS SCHEDULED
Status: DISCONTINUED | OUTPATIENT
Start: 2017-09-21 | End: 2017-09-30 | Stop reason: HOSPADM

## 2017-09-21 RX ORDER — FAMOTIDINE 20 MG/1
20 TABLET, FILM COATED ORAL 2 TIMES DAILY
Status: CANCELLED | OUTPATIENT
Start: 2017-09-21

## 2017-09-21 RX ORDER — MAGNESIUM SULFATE 1 G/100ML
1 INJECTION INTRAVENOUS AS NEEDED
Status: DISCONTINUED | OUTPATIENT
Start: 2017-09-21 | End: 2017-09-30 | Stop reason: HOSPADM

## 2017-09-21 RX ORDER — DULOXETIN HYDROCHLORIDE 60 MG/1
60 CAPSULE, DELAYED RELEASE ORAL EVERY 12 HOURS SCHEDULED
Status: DISCONTINUED | OUTPATIENT
Start: 2017-09-21 | End: 2017-09-30 | Stop reason: HOSPADM

## 2017-09-21 RX ORDER — ALBUTEROL SULFATE 90 UG/1
1-2 AEROSOL, METERED RESPIRATORY (INHALATION) EVERY 6 HOURS PRN
COMMUNITY
End: 2017-12-18 | Stop reason: HOSPADM

## 2017-09-21 RX ORDER — SUCRALFATE 1 G/1
1 TABLET ORAL NIGHTLY
Status: CANCELLED | OUTPATIENT
Start: 2017-09-21

## 2017-09-21 RX ORDER — NICOTINE POLACRILEX 4 MG
15 LOZENGE BUCCAL
Status: DISCONTINUED | OUTPATIENT
Start: 2017-09-21 | End: 2017-09-30 | Stop reason: HOSPADM

## 2017-09-21 RX ORDER — POTASSIUM CHLORIDE 750 MG/1
40 CAPSULE, EXTENDED RELEASE ORAL AS NEEDED
Status: DISCONTINUED | OUTPATIENT
Start: 2017-09-21 | End: 2017-09-30 | Stop reason: HOSPADM

## 2017-09-21 RX ORDER — PIPERACILLIN SODIUM, TAZOBACTAM SODIUM 3; .375 G/15ML; G/15ML
3.38 INJECTION, POWDER, LYOPHILIZED, FOR SOLUTION INTRAVENOUS ONCE
Status: DISCONTINUED | OUTPATIENT
Start: 2017-09-21 | End: 2017-09-21 | Stop reason: SDUPTHER

## 2017-09-21 RX ORDER — MAGNESIUM SULFATE HEPTAHYDRATE 40 MG/ML
4 INJECTION, SOLUTION INTRAVENOUS AS NEEDED
Status: DISCONTINUED | OUTPATIENT
Start: 2017-09-21 | End: 2017-09-30 | Stop reason: HOSPADM

## 2017-09-21 RX ORDER — DEXTROSE MONOHYDRATE 25 G/50ML
25 INJECTION, SOLUTION INTRAVENOUS
Status: DISCONTINUED | OUTPATIENT
Start: 2017-09-21 | End: 2017-09-30 | Stop reason: HOSPADM

## 2017-09-21 RX ORDER — MENTHOL AND ZINC OXIDE .44; 20.625 G/100G; G/100G
OINTMENT TOPICAL 3 TIMES DAILY PRN
Status: CANCELLED | OUTPATIENT
Start: 2017-09-21

## 2017-09-21 RX ORDER — MULTIVITAMIN
1 TABLET ORAL DAILY
Status: DISCONTINUED | OUTPATIENT
Start: 2017-09-22 | End: 2017-09-30 | Stop reason: HOSPADM

## 2017-09-21 RX ORDER — POTASSIUM CHLORIDE 7.45 MG/ML
10 INJECTION INTRAVENOUS
Status: DISCONTINUED | OUTPATIENT
Start: 2017-09-21 | End: 2017-09-30 | Stop reason: HOSPADM

## 2017-09-21 RX ADMIN — SODIUM CHLORIDE 500 ML: 9 INJECTION, SOLUTION INTRAVENOUS at 16:07

## 2017-09-21 RX ADMIN — ARIPIPRAZOLE 10 MG: 10 TABLET ORAL at 23:37

## 2017-09-21 RX ADMIN — FAMOTIDINE 20 MG: 20 TABLET, FILM COATED ORAL at 23:37

## 2017-09-21 RX ADMIN — TOPIRAMATE 100 MG: 100 TABLET, FILM COATED ORAL at 23:37

## 2017-09-21 RX ADMIN — VANCOMYCIN HYDROCHLORIDE 2250 MG: 5 INJECTION, POWDER, LYOPHILIZED, FOR SOLUTION INTRAVENOUS at 16:07

## 2017-09-21 RX ADMIN — PIPERACILLIN SODIUM,TAZOBACTAM SODIUM 3.38 G: 3; .375 INJECTION, POWDER, FOR SOLUTION INTRAVENOUS at 21:23

## 2017-09-21 RX ADMIN — DULOXETINE HYDROCHLORIDE 60 MG: 60 CAPSULE, DELAYED RELEASE ORAL at 23:37

## 2017-09-21 RX ADMIN — PIPERACILLIN SODIUM,TAZOBACTAM SODIUM 3.38 G: 3; .375 INJECTION, POWDER, FOR SOLUTION INTRAVENOUS at 15:28

## 2017-09-21 RX ADMIN — GABAPENTIN 800 MG: 400 CAPSULE ORAL at 23:41

## 2017-09-21 RX ADMIN — SODIUM CHLORIDE 125 ML/HR: 9 INJECTION, SOLUTION INTRAVENOUS at 16:07

## 2017-09-21 RX ADMIN — SODIUM CHLORIDE 125 ML/HR: 9 INJECTION, SOLUTION INTRAVENOUS at 21:39

## 2017-09-21 RX ADMIN — SUCRALFATE 1 G: 1 TABLET ORAL at 23:37

## 2017-09-21 RX ADMIN — TRAZODONE HYDROCHLORIDE 25 MG: 50 TABLET ORAL at 23:38

## 2017-09-22 LAB
027 TOXIN: NORMAL
ANION GAP SERPL CALCULATED.3IONS-SCNC: 12.2 MMOL/L (ref 3.6–11.2)
BASOPHILS # BLD AUTO: 0.05 10*3/MM3 (ref 0–0.3)
BASOPHILS NFR BLD AUTO: 0.3 % (ref 0–2)
BUN BLD-MCNC: 15 MG/DL (ref 7–21)
BUN/CREAT SERPL: 15.3 (ref 7–25)
C DIFF TOX GENS STL QL NAA+PROBE: NEGATIVE
CALCIUM SPEC-SCNC: 9.3 MG/DL (ref 7.7–10)
CHLORIDE SERPL-SCNC: 106 MMOL/L (ref 99–112)
CO2 SERPL-SCNC: 19.8 MMOL/L (ref 24.3–31.9)
CREAT BLD-MCNC: 0.98 MG/DL (ref 0.43–1.29)
CRP SERPL-MCNC: 28.32 MG/DL (ref 0–0.99)
DEPRECATED RDW RBC AUTO: 46.3 FL (ref 37–54)
EOSINOPHIL # BLD AUTO: 0.64 10*3/MM3 (ref 0–0.7)
EOSINOPHIL NFR BLD AUTO: 3.5 % (ref 0–5)
ERYTHROCYTE [DISTWIDTH] IN BLOOD BY AUTOMATED COUNT: 14.8 % (ref 11.5–14.5)
GFR SERPL CREATININE-BSD FRML MDRD: 85 ML/MIN/1.73
GLUCOSE BLD-MCNC: 102 MG/DL (ref 70–110)
GLUCOSE BLDC GLUCOMTR-MCNC: 100 MG/DL (ref 70–130)
GLUCOSE BLDC GLUCOMTR-MCNC: 124 MG/DL (ref 70–130)
GLUCOSE BLDC GLUCOMTR-MCNC: 86 MG/DL (ref 70–130)
GLUCOSE BLDC GLUCOMTR-MCNC: 98 MG/DL (ref 70–130)
HCT VFR BLD AUTO: 33.1 % (ref 42–52)
HGB BLD-MCNC: 10 G/DL (ref 14–18)
IMM GRANULOCYTES # BLD: 0.06 10*3/MM3 (ref 0–0.03)
IMM GRANULOCYTES NFR BLD: 0.3 % (ref 0–0.5)
LYMPHOCYTES # BLD AUTO: 2.43 10*3/MM3 (ref 1–3)
LYMPHOCYTES NFR BLD AUTO: 13.4 % (ref 21–51)
MAGNESIUM SERPL-MCNC: 2.1 MG/DL (ref 1.7–2.6)
MCH RBC QN AUTO: 25.7 PG (ref 27–33)
MCHC RBC AUTO-ENTMCNC: 30.2 G/DL (ref 33–37)
MCV RBC AUTO: 85.1 FL (ref 80–94)
MONOCYTES # BLD AUTO: 1.08 10*3/MM3 (ref 0.1–0.9)
MONOCYTES NFR BLD AUTO: 6 % (ref 0–10)
NEUTROPHILS # BLD AUTO: 13.83 10*3/MM3 (ref 1.4–6.5)
NEUTROPHILS NFR BLD AUTO: 76.5 % (ref 30–70)
NRBC BLD MANUAL-RTO: 0 /100 WBC (ref 0–0)
OSMOLALITY SERPL CALC.SUM OF ELEC: 276.7 MOSM/KG (ref 273–305)
PLATELET # BLD AUTO: 565 10*3/MM3 (ref 130–400)
PMV BLD AUTO: 10.1 FL (ref 6–10)
POTASSIUM BLD-SCNC: 3.8 MMOL/L (ref 3.5–5.3)
RBC # BLD AUTO: 3.89 10*6/MM3 (ref 4.7–6.1)
SODIUM BLD-SCNC: 138 MMOL/L (ref 135–153)
WBC NRBC COR # BLD: 18.09 10*3/MM3 (ref 4.5–12.5)

## 2017-09-22 PROCEDURE — 25010000002 MAGNESIUM SULFATE 2 GM/50ML SOLUTION: Performed by: INTERNAL MEDICINE

## 2017-09-22 PROCEDURE — 80048 BASIC METABOLIC PNL TOTAL CA: CPT | Performed by: PHYSICIAN ASSISTANT

## 2017-09-22 PROCEDURE — 99221 1ST HOSP IP/OBS SF/LOW 40: CPT | Performed by: SURGERY

## 2017-09-22 PROCEDURE — 86140 C-REACTIVE PROTEIN: CPT | Performed by: PHYSICIAN ASSISTANT

## 2017-09-22 PROCEDURE — 85025 COMPLETE CBC W/AUTO DIFF WBC: CPT | Performed by: PHYSICIAN ASSISTANT

## 2017-09-22 PROCEDURE — 82962 GLUCOSE BLOOD TEST: CPT

## 2017-09-22 PROCEDURE — 25010000002 PIPERACILLIN-TAZOBACTAM

## 2017-09-22 PROCEDURE — 83735 ASSAY OF MAGNESIUM: CPT | Performed by: HOSPITALIST

## 2017-09-22 PROCEDURE — 94799 UNLISTED PULMONARY SVC/PX: CPT

## 2017-09-22 PROCEDURE — 25010000002 VANCOMYCIN PER 500 MG

## 2017-09-22 PROCEDURE — 99233 SBSQ HOSP IP/OBS HIGH 50: CPT | Performed by: INTERNAL MEDICINE

## 2017-09-22 PROCEDURE — 25010000002 CEFTRIAXONE: Performed by: INTERNAL MEDICINE

## 2017-09-22 RX ORDER — SODIUM HYPOCHLORITE 1.25 MG/ML
SOLUTION TOPICAL 2 TIMES DAILY
Status: DISCONTINUED | OUTPATIENT
Start: 2017-09-22 | End: 2017-09-30 | Stop reason: HOSPADM

## 2017-09-22 RX ADMIN — CEFTRIAXONE 2 G: 2 INJECTION, POWDER, FOR SOLUTION INTRAMUSCULAR; INTRAVENOUS at 11:56

## 2017-09-22 RX ADMIN — Medication 10 ML: at 20:42

## 2017-09-22 RX ADMIN — PIPERACILLIN SODIUM,TAZOBACTAM SODIUM 3.38 G: 3; .375 INJECTION, POWDER, FOR SOLUTION INTRAVENOUS at 05:56

## 2017-09-22 RX ADMIN — ARIPIPRAZOLE 10 MG: 10 TABLET ORAL at 20:41

## 2017-09-22 RX ADMIN — MODAFINIL 200 MG: 100 TABLET ORAL at 09:21

## 2017-09-22 RX ADMIN — GABAPENTIN 800 MG: 400 CAPSULE ORAL at 15:47

## 2017-09-22 RX ADMIN — GABAPENTIN 800 MG: 400 CAPSULE ORAL at 05:56

## 2017-09-22 RX ADMIN — VANCOMYCIN HYDROCHLORIDE 2000 MG: 5 INJECTION, POWDER, LYOPHILIZED, FOR SOLUTION INTRAVENOUS at 17:22

## 2017-09-22 RX ADMIN — VANCOMYCIN HYDROCHLORIDE 2000 MG: 5 INJECTION, POWDER, LYOPHILIZED, FOR SOLUTION INTRAVENOUS at 04:23

## 2017-09-22 RX ADMIN — DAKIN'S SOLUTION 0.125% (QUARTER STRENGTH): 0.12 SOLUTION at 20:42

## 2017-09-22 RX ADMIN — DAKIN'S SOLUTION 0.125% (QUARTER STRENGTH): 0.12 SOLUTION at 09:21

## 2017-09-22 RX ADMIN — TOPIRAMATE 100 MG: 100 TABLET, FILM COATED ORAL at 20:41

## 2017-09-22 RX ADMIN — FAMOTIDINE 20 MG: 20 TABLET, FILM COATED ORAL at 09:21

## 2017-09-22 RX ADMIN — CHOLECALCIFEROL CAP 125 MCG (5000 UNIT) 5000 UNITS: 125 CAP at 11:57

## 2017-09-22 RX ADMIN — FAMOTIDINE 20 MG: 20 TABLET, FILM COATED ORAL at 20:41

## 2017-09-22 RX ADMIN — TOPIRAMATE 100 MG: 100 TABLET, FILM COATED ORAL at 17:22

## 2017-09-22 RX ADMIN — GABAPENTIN 800 MG: 400 CAPSULE ORAL at 20:42

## 2017-09-22 RX ADMIN — Medication 1 TABLET: at 09:21

## 2017-09-22 RX ADMIN — MAGNESIUM SULFATE IN WATER 2 G: 40 INJECTION, SOLUTION INTRAVENOUS at 01:32

## 2017-09-22 RX ADMIN — DULOXETINE HYDROCHLORIDE 60 MG: 60 CAPSULE, DELAYED RELEASE ORAL at 20:41

## 2017-09-22 RX ADMIN — TOPIRAMATE 100 MG: 100 TABLET, FILM COATED ORAL at 09:21

## 2017-09-22 RX ADMIN — SUCRALFATE 1 G: 1 TABLET ORAL at 20:41

## 2017-09-22 RX ADMIN — DULOXETINE HYDROCHLORIDE 60 MG: 60 CAPSULE, DELAYED RELEASE ORAL at 09:21

## 2017-09-22 RX ADMIN — PANTOPRAZOLE SODIUM 40 MG: 40 TABLET, DELAYED RELEASE ORAL at 05:56

## 2017-09-23 LAB
ALBUMIN SERPL-MCNC: 3.4 G/DL (ref 3.5–5)
ALBUMIN/GLOB SERPL: 0.9 G/DL (ref 1.5–2.5)
ALP SERPL-CCNC: 91 U/L (ref 40–129)
ALT SERPL W P-5'-P-CCNC: 23 U/L (ref 10–44)
ANION GAP SERPL CALCULATED.3IONS-SCNC: 9.3 MMOL/L (ref 3.6–11.2)
AST SERPL-CCNC: 22 U/L (ref 10–34)
BACTERIA SPEC AEROBE CULT: NORMAL
BASOPHILS # BLD AUTO: 0.03 10*3/MM3 (ref 0–0.3)
BASOPHILS NFR BLD AUTO: 0.2 % (ref 0–2)
BILIRUB SERPL-MCNC: 0.2 MG/DL (ref 0.2–1.8)
BUN BLD-MCNC: 12 MG/DL (ref 7–21)
BUN/CREAT SERPL: 13.5 (ref 7–25)
CALCIUM SPEC-SCNC: 9.1 MG/DL (ref 7.7–10)
CHLORIDE SERPL-SCNC: 110 MMOL/L (ref 99–112)
CO2 SERPL-SCNC: 19.7 MMOL/L (ref 24.3–31.9)
CREAT BLD-MCNC: 0.89 MG/DL (ref 0.43–1.29)
CRP SERPL-MCNC: 22.16 MG/DL (ref 0–0.99)
DEPRECATED RDW RBC AUTO: 43.8 FL (ref 37–54)
EOSINOPHIL # BLD AUTO: 0.71 10*3/MM3 (ref 0–0.7)
EOSINOPHIL NFR BLD AUTO: 5.5 % (ref 0–5)
ERYTHROCYTE [DISTWIDTH] IN BLOOD BY AUTOMATED COUNT: 14.5 % (ref 11.5–14.5)
GFR SERPL CREATININE-BSD FRML MDRD: 95 ML/MIN/1.73
GLOBULIN UR ELPH-MCNC: 3.8 GM/DL
GLUCOSE BLD-MCNC: 98 MG/DL (ref 70–110)
GLUCOSE BLDC GLUCOMTR-MCNC: 106 MG/DL (ref 70–130)
GLUCOSE BLDC GLUCOMTR-MCNC: 119 MG/DL (ref 70–130)
GLUCOSE BLDC GLUCOMTR-MCNC: 93 MG/DL (ref 70–130)
GLUCOSE BLDC GLUCOMTR-MCNC: 96 MG/DL (ref 70–130)
HCT VFR BLD AUTO: 29.5 % (ref 42–52)
HGB BLD-MCNC: 8.8 G/DL (ref 14–18)
IMM GRANULOCYTES # BLD: 0.04 10*3/MM3 (ref 0–0.03)
IMM GRANULOCYTES NFR BLD: 0.3 % (ref 0–0.5)
LYMPHOCYTES # BLD AUTO: 2.11 10*3/MM3 (ref 1–3)
LYMPHOCYTES NFR BLD AUTO: 16.2 % (ref 21–51)
MAGNESIUM SERPL-MCNC: 2.1 MG/DL (ref 1.7–2.6)
MCH RBC QN AUTO: 25.3 PG (ref 27–33)
MCHC RBC AUTO-ENTMCNC: 29.8 G/DL (ref 33–37)
MCV RBC AUTO: 84.8 FL (ref 80–94)
MONOCYTES # BLD AUTO: 0.58 10*3/MM3 (ref 0.1–0.9)
MONOCYTES NFR BLD AUTO: 4.5 % (ref 0–10)
NEUTROPHILS # BLD AUTO: 9.54 10*3/MM3 (ref 1.4–6.5)
NEUTROPHILS NFR BLD AUTO: 73.3 % (ref 30–70)
OSMOLALITY SERPL CALC.SUM OF ELEC: 277.3 MOSM/KG (ref 273–305)
PLATELET # BLD AUTO: 581 10*3/MM3 (ref 130–400)
PMV BLD AUTO: 9.4 FL (ref 6–10)
POTASSIUM BLD-SCNC: 3.2 MMOL/L (ref 3.5–5.3)
POTASSIUM BLD-SCNC: 3.5 MMOL/L (ref 3.5–5.3)
PROT SERPL-MCNC: 7.2 G/DL (ref 6–8)
RBC # BLD AUTO: 3.48 10*6/MM3 (ref 4.7–6.1)
SODIUM BLD-SCNC: 139 MMOL/L (ref 135–153)
VANCOMYCIN TROUGH SERPL-MCNC: 24 MCG/ML (ref 5–15)
WBC NRBC COR # BLD: 13.01 10*3/MM3 (ref 4.5–12.5)

## 2017-09-23 PROCEDURE — 99233 SBSQ HOSP IP/OBS HIGH 50: CPT | Performed by: INTERNAL MEDICINE

## 2017-09-23 PROCEDURE — 25010000002 VANCOMYCIN PER 500 MG: Performed by: INTERNAL MEDICINE

## 2017-09-23 PROCEDURE — 25010000002 VANCOMYCIN PER 500 MG

## 2017-09-23 PROCEDURE — 84132 ASSAY OF SERUM POTASSIUM: CPT | Performed by: INTERNAL MEDICINE

## 2017-09-23 PROCEDURE — 94799 UNLISTED PULMONARY SVC/PX: CPT

## 2017-09-23 PROCEDURE — 85025 COMPLETE CBC W/AUTO DIFF WBC: CPT | Performed by: INTERNAL MEDICINE

## 2017-09-23 PROCEDURE — 83735 ASSAY OF MAGNESIUM: CPT | Performed by: INTERNAL MEDICINE

## 2017-09-23 PROCEDURE — 80202 ASSAY OF VANCOMYCIN: CPT

## 2017-09-23 PROCEDURE — 25010000002 CEFTRIAXONE: Performed by: INTERNAL MEDICINE

## 2017-09-23 PROCEDURE — 86140 C-REACTIVE PROTEIN: CPT | Performed by: INTERNAL MEDICINE

## 2017-09-23 PROCEDURE — 80053 COMPREHEN METABOLIC PANEL: CPT | Performed by: INTERNAL MEDICINE

## 2017-09-23 PROCEDURE — 82962 GLUCOSE BLOOD TEST: CPT

## 2017-09-23 RX ORDER — POTASSIUM CHLORIDE 20 MEQ/1
40 TABLET, EXTENDED RELEASE ORAL EVERY 4 HOURS
Status: COMPLETED | OUTPATIENT
Start: 2017-09-23 | End: 2017-09-23

## 2017-09-23 RX ADMIN — VANCOMYCIN HYDROCHLORIDE 1500 MG: 5 INJECTION, POWDER, LYOPHILIZED, FOR SOLUTION INTRAVENOUS at 12:39

## 2017-09-23 RX ADMIN — DAKIN'S SOLUTION 0.125% (QUARTER STRENGTH): 0.12 SOLUTION at 20:21

## 2017-09-23 RX ADMIN — CEFTRIAXONE 2 G: 2 INJECTION, POWDER, FOR SOLUTION INTRAMUSCULAR; INTRAVENOUS at 12:39

## 2017-09-23 RX ADMIN — VANCOMYCIN HYDROCHLORIDE 1500 MG: 5 INJECTION, POWDER, LYOPHILIZED, FOR SOLUTION INTRAVENOUS at 22:37

## 2017-09-23 RX ADMIN — POTASSIUM CHLORIDE 40 MEQ: 1500 TABLET, EXTENDED RELEASE ORAL at 12:39

## 2017-09-23 RX ADMIN — POTASSIUM CHLORIDE 40 MEQ: 1500 TABLET, EXTENDED RELEASE ORAL at 07:59

## 2017-09-23 RX ADMIN — CHOLECALCIFEROL CAP 125 MCG (5000 UNIT) 5000 UNITS: 125 CAP at 07:58

## 2017-09-23 RX ADMIN — GABAPENTIN 800 MG: 400 CAPSULE ORAL at 05:09

## 2017-09-23 RX ADMIN — FAMOTIDINE 20 MG: 20 TABLET, FILM COATED ORAL at 07:58

## 2017-09-23 RX ADMIN — GABAPENTIN 800 MG: 400 CAPSULE ORAL at 14:54

## 2017-09-23 RX ADMIN — SODIUM CHLORIDE 75 ML/HR: 9 INJECTION, SOLUTION INTRAVENOUS at 01:43

## 2017-09-23 RX ADMIN — MODAFINIL 200 MG: 100 TABLET ORAL at 07:58

## 2017-09-23 RX ADMIN — BACLOFEN 20 MG: 10 TABLET ORAL at 17:21

## 2017-09-23 RX ADMIN — DAKIN'S SOLUTION 0.125% (QUARTER STRENGTH): 0.12 SOLUTION at 17:21

## 2017-09-23 RX ADMIN — Medication 10 ML: at 20:21

## 2017-09-23 RX ADMIN — ARIPIPRAZOLE 10 MG: 10 TABLET ORAL at 20:20

## 2017-09-23 RX ADMIN — DULOXETINE HYDROCHLORIDE 60 MG: 60 CAPSULE, DELAYED RELEASE ORAL at 07:58

## 2017-09-23 RX ADMIN — DULOXETINE HYDROCHLORIDE 60 MG: 60 CAPSULE, DELAYED RELEASE ORAL at 20:20

## 2017-09-23 RX ADMIN — Medication 1 TABLET: at 07:58

## 2017-09-23 RX ADMIN — FAMOTIDINE 20 MG: 20 TABLET, FILM COATED ORAL at 20:21

## 2017-09-23 RX ADMIN — GABAPENTIN 800 MG: 400 CAPSULE ORAL at 20:21

## 2017-09-23 RX ADMIN — PANTOPRAZOLE SODIUM 40 MG: 40 TABLET, DELAYED RELEASE ORAL at 05:09

## 2017-09-23 RX ADMIN — TOPIRAMATE 100 MG: 100 TABLET, FILM COATED ORAL at 07:59

## 2017-09-23 RX ADMIN — TOPIRAMATE 100 MG: 100 TABLET, FILM COATED ORAL at 20:21

## 2017-09-23 RX ADMIN — DAKIN'S SOLUTION 0.125% (QUARTER STRENGTH): 0.12 SOLUTION at 08:00

## 2017-09-23 RX ADMIN — BACLOFEN 20 MG: 10 TABLET ORAL at 23:29

## 2017-09-23 RX ADMIN — TOPIRAMATE 100 MG: 100 TABLET, FILM COATED ORAL at 17:21

## 2017-09-23 RX ADMIN — SUCRALFATE 1 G: 1 TABLET ORAL at 20:20

## 2017-09-24 LAB
ALBUMIN SERPL-MCNC: 3.7 G/DL (ref 3.5–5)
ALBUMIN/GLOB SERPL: 0.9 G/DL (ref 1.5–2.5)
ALP SERPL-CCNC: 94 U/L (ref 40–129)
ALT SERPL W P-5'-P-CCNC: 31 U/L (ref 10–44)
ANION GAP SERPL CALCULATED.3IONS-SCNC: 6.5 MMOL/L (ref 3.6–11.2)
AST SERPL-CCNC: 25 U/L (ref 10–34)
BACTERIA SPEC AEROBE CULT: NORMAL
BASOPHILS # BLD AUTO: 0.03 10*3/MM3 (ref 0–0.3)
BASOPHILS NFR BLD AUTO: 0.2 % (ref 0–2)
BILIRUB SERPL-MCNC: 0.2 MG/DL (ref 0.2–1.8)
BUN BLD-MCNC: 8 MG/DL (ref 7–21)
BUN/CREAT SERPL: 10 (ref 7–25)
CALCIUM SPEC-SCNC: 9.5 MG/DL (ref 7.7–10)
CHLORIDE SERPL-SCNC: 113 MMOL/L (ref 99–112)
CO2 SERPL-SCNC: 19.5 MMOL/L (ref 24.3–31.9)
CREAT BLD-MCNC: 0.8 MG/DL (ref 0.43–1.29)
CRP SERPL-MCNC: 17.83 MG/DL (ref 0–0.99)
DEPRECATED RDW RBC AUTO: 47 FL (ref 37–54)
EOSINOPHIL # BLD AUTO: 0.63 10*3/MM3 (ref 0–0.7)
EOSINOPHIL NFR BLD AUTO: 5 % (ref 0–5)
ERYTHROCYTE [DISTWIDTH] IN BLOOD BY AUTOMATED COUNT: 15 % (ref 11.5–14.5)
GFR SERPL CREATININE-BSD FRML MDRD: 108 ML/MIN/1.73
GLOBULIN UR ELPH-MCNC: 4 GM/DL
GLUCOSE BLD-MCNC: 112 MG/DL (ref 70–110)
GLUCOSE BLDC GLUCOMTR-MCNC: 100 MG/DL (ref 70–130)
GLUCOSE BLDC GLUCOMTR-MCNC: 110 MG/DL (ref 70–130)
GLUCOSE BLDC GLUCOMTR-MCNC: 119 MG/DL (ref 70–130)
GLUCOSE BLDC GLUCOMTR-MCNC: 167 MG/DL (ref 70–130)
HCT VFR BLD AUTO: 31.7 % (ref 42–52)
HGB BLD-MCNC: 9.3 G/DL (ref 14–18)
IMM GRANULOCYTES # BLD: 0.05 10*3/MM3 (ref 0–0.03)
IMM GRANULOCYTES NFR BLD: 0.4 % (ref 0–0.5)
LYMPHOCYTES # BLD AUTO: 2.38 10*3/MM3 (ref 1–3)
LYMPHOCYTES NFR BLD AUTO: 19 % (ref 21–51)
MAGNESIUM SERPL-MCNC: 2.1 MG/DL (ref 1.7–2.6)
MCH RBC QN AUTO: 25.3 PG (ref 27–33)
MCHC RBC AUTO-ENTMCNC: 29.3 G/DL (ref 33–37)
MCV RBC AUTO: 86.1 FL (ref 80–94)
MONOCYTES # BLD AUTO: 0.57 10*3/MM3 (ref 0.1–0.9)
MONOCYTES NFR BLD AUTO: 4.6 % (ref 0–10)
NEUTROPHILS # BLD AUTO: 8.86 10*3/MM3 (ref 1.4–6.5)
NEUTROPHILS NFR BLD AUTO: 70.8 % (ref 30–70)
NRBC BLD MANUAL-RTO: 0 /100 WBC (ref 0–0)
OSMOLALITY SERPL CALC.SUM OF ELEC: 276.6 MOSM/KG (ref 273–305)
PLATELET # BLD AUTO: 583 10*3/MM3 (ref 130–400)
PMV BLD AUTO: 9.7 FL (ref 6–10)
POTASSIUM BLD-SCNC: 3.6 MMOL/L (ref 3.5–5.3)
POTASSIUM BLD-SCNC: 3.6 MMOL/L (ref 3.5–5.3)
PROT SERPL-MCNC: 7.7 G/DL (ref 6–8)
RBC # BLD AUTO: 3.68 10*6/MM3 (ref 4.7–6.1)
SODIUM BLD-SCNC: 139 MMOL/L (ref 135–153)
WBC NRBC COR # BLD: 12.52 10*3/MM3 (ref 4.5–12.5)

## 2017-09-24 PROCEDURE — 25010000002 VANCOMYCIN PER 500 MG: Performed by: INTERNAL MEDICINE

## 2017-09-24 PROCEDURE — 83735 ASSAY OF MAGNESIUM: CPT | Performed by: INTERNAL MEDICINE

## 2017-09-24 PROCEDURE — 85025 COMPLETE CBC W/AUTO DIFF WBC: CPT | Performed by: INTERNAL MEDICINE

## 2017-09-24 PROCEDURE — 63710000001 INSULIN ASPART PER 5 UNITS: Performed by: PHYSICIAN ASSISTANT

## 2017-09-24 PROCEDURE — 25010000002 CEFTRIAXONE: Performed by: INTERNAL MEDICINE

## 2017-09-24 PROCEDURE — 94799 UNLISTED PULMONARY SVC/PX: CPT

## 2017-09-24 PROCEDURE — 86140 C-REACTIVE PROTEIN: CPT | Performed by: INTERNAL MEDICINE

## 2017-09-24 PROCEDURE — 84132 ASSAY OF SERUM POTASSIUM: CPT | Performed by: INTERNAL MEDICINE

## 2017-09-24 PROCEDURE — 80053 COMPREHEN METABOLIC PANEL: CPT | Performed by: INTERNAL MEDICINE

## 2017-09-24 PROCEDURE — 82962 GLUCOSE BLOOD TEST: CPT

## 2017-09-24 PROCEDURE — 99233 SBSQ HOSP IP/OBS HIGH 50: CPT | Performed by: INTERNAL MEDICINE

## 2017-09-24 RX ORDER — POTASSIUM CHLORIDE 20 MEQ/1
40 TABLET, EXTENDED RELEASE ORAL EVERY 4 HOURS
Status: COMPLETED | OUTPATIENT
Start: 2017-09-24 | End: 2017-09-24

## 2017-09-24 RX ORDER — POTASSIUM CHLORIDE 20 MEQ/1
40 TABLET, EXTENDED RELEASE ORAL EVERY 4 HOURS
Status: COMPLETED | OUTPATIENT
Start: 2017-09-24 | End: 2017-09-25

## 2017-09-24 RX ADMIN — MODAFINIL 200 MG: 100 TABLET ORAL at 08:10

## 2017-09-24 RX ADMIN — Medication 10 ML: at 20:27

## 2017-09-24 RX ADMIN — Medication: at 15:17

## 2017-09-24 RX ADMIN — FAMOTIDINE 20 MG: 20 TABLET, FILM COATED ORAL at 20:26

## 2017-09-24 RX ADMIN — BACLOFEN 20 MG: 10 TABLET ORAL at 20:26

## 2017-09-24 RX ADMIN — TOPIRAMATE 100 MG: 100 TABLET, FILM COATED ORAL at 20:25

## 2017-09-24 RX ADMIN — GABAPENTIN 800 MG: 400 CAPSULE ORAL at 14:28

## 2017-09-24 RX ADMIN — DAKIN'S SOLUTION 0.125% (QUARTER STRENGTH): 0.12 SOLUTION at 17:41

## 2017-09-24 RX ADMIN — DULOXETINE HYDROCHLORIDE 60 MG: 60 CAPSULE, DELAYED RELEASE ORAL at 20:26

## 2017-09-24 RX ADMIN — VANCOMYCIN HYDROCHLORIDE 1500 MG: 5 INJECTION, POWDER, LYOPHILIZED, FOR SOLUTION INTRAVENOUS at 08:10

## 2017-09-24 RX ADMIN — ARIPIPRAZOLE 10 MG: 10 TABLET ORAL at 20:26

## 2017-09-24 RX ADMIN — GABAPENTIN 800 MG: 400 CAPSULE ORAL at 05:09

## 2017-09-24 RX ADMIN — DULOXETINE HYDROCHLORIDE 60 MG: 60 CAPSULE, DELAYED RELEASE ORAL at 08:10

## 2017-09-24 RX ADMIN — POTASSIUM CHLORIDE 40 MEQ: 1500 TABLET, EXTENDED RELEASE ORAL at 20:25

## 2017-09-24 RX ADMIN — TOPIRAMATE 100 MG: 100 TABLET, FILM COATED ORAL at 17:41

## 2017-09-24 RX ADMIN — DAKIN'S SOLUTION 0.125% (QUARTER STRENGTH): 0.12 SOLUTION at 20:26

## 2017-09-24 RX ADMIN — PANTOPRAZOLE SODIUM 40 MG: 40 TABLET, DELAYED RELEASE ORAL at 05:09

## 2017-09-24 RX ADMIN — DAKIN'S SOLUTION 0.125% (QUARTER STRENGTH): 0.12 SOLUTION at 08:10

## 2017-09-24 RX ADMIN — SUCRALFATE 1 G: 1 TABLET ORAL at 20:26

## 2017-09-24 RX ADMIN — CEFTRIAXONE 2 G: 2 INJECTION, POWDER, FOR SOLUTION INTRAMUSCULAR; INTRAVENOUS at 12:36

## 2017-09-24 RX ADMIN — INSULIN ASPART 2 UNITS: 100 INJECTION, SOLUTION INTRAVENOUS; SUBCUTANEOUS at 20:26

## 2017-09-24 RX ADMIN — POTASSIUM CHLORIDE 40 MEQ: 1500 TABLET, EXTENDED RELEASE ORAL at 12:36

## 2017-09-24 RX ADMIN — Medication 1 TABLET: at 08:10

## 2017-09-24 RX ADMIN — VANCOMYCIN HYDROCHLORIDE 1500 MG: 5 INJECTION, POWDER, LYOPHILIZED, FOR SOLUTION INTRAVENOUS at 22:22

## 2017-09-24 RX ADMIN — GABAPENTIN 800 MG: 400 CAPSULE ORAL at 20:26

## 2017-09-24 RX ADMIN — TOPIRAMATE 100 MG: 100 TABLET, FILM COATED ORAL at 08:10

## 2017-09-24 RX ADMIN — BACLOFEN 20 MG: 10 TABLET ORAL at 15:22

## 2017-09-24 RX ADMIN — POTASSIUM CHLORIDE 40 MEQ: 1500 TABLET, EXTENDED RELEASE ORAL at 08:10

## 2017-09-24 RX ADMIN — FAMOTIDINE 20 MG: 20 TABLET, FILM COATED ORAL at 08:10

## 2017-09-24 RX ADMIN — CHOLECALCIFEROL CAP 125 MCG (5000 UNIT) 5000 UNITS: 125 CAP at 08:10

## 2017-09-24 RX ADMIN — BACLOFEN 20 MG: 10 TABLET ORAL at 08:10

## 2017-09-25 LAB
ANION GAP SERPL CALCULATED.3IONS-SCNC: 8.4 MMOL/L (ref 3.6–11.2)
BASOPHILS # BLD AUTO: 0.03 10*3/MM3 (ref 0–0.3)
BASOPHILS NFR BLD AUTO: 0.3 % (ref 0–2)
BUN BLD-MCNC: 7 MG/DL (ref 7–21)
BUN/CREAT SERPL: 9.9 (ref 7–25)
CALCIUM SPEC-SCNC: 8.7 MG/DL (ref 7.7–10)
CHLORIDE SERPL-SCNC: 113 MMOL/L (ref 99–112)
CO2 SERPL-SCNC: 18.6 MMOL/L (ref 24.3–31.9)
CREAT BLD-MCNC: 0.71 MG/DL (ref 0.43–1.29)
CRP SERPL-MCNC: 14.06 MG/DL (ref 0–0.99)
DEPRECATED RDW RBC AUTO: 45 FL (ref 37–54)
EOSINOPHIL # BLD AUTO: 0.59 10*3/MM3 (ref 0–0.7)
EOSINOPHIL NFR BLD AUTO: 5 % (ref 0–5)
ERYTHROCYTE [DISTWIDTH] IN BLOOD BY AUTOMATED COUNT: 14.7 % (ref 11.5–14.5)
GFR SERPL CREATININE-BSD FRML MDRD: 124 ML/MIN/1.73
GLUCOSE BLD-MCNC: 105 MG/DL (ref 70–110)
GLUCOSE BLDC GLUCOMTR-MCNC: 100 MG/DL (ref 70–130)
GLUCOSE BLDC GLUCOMTR-MCNC: 102 MG/DL (ref 70–130)
GLUCOSE BLDC GLUCOMTR-MCNC: 119 MG/DL (ref 70–130)
GLUCOSE BLDC GLUCOMTR-MCNC: 137 MG/DL (ref 70–130)
HCT VFR BLD AUTO: 31.6 % (ref 42–52)
HGB BLD-MCNC: 9.2 G/DL (ref 14–18)
IMM GRANULOCYTES # BLD: 0.05 10*3/MM3 (ref 0–0.03)
IMM GRANULOCYTES NFR BLD: 0.4 % (ref 0–0.5)
LYMPHOCYTES # BLD AUTO: 2.38 10*3/MM3 (ref 1–3)
LYMPHOCYTES NFR BLD AUTO: 20.1 % (ref 21–51)
MAGNESIUM SERPL-MCNC: 1.9 MG/DL (ref 1.7–2.6)
MCH RBC QN AUTO: 25.2 PG (ref 27–33)
MCHC RBC AUTO-ENTMCNC: 29.1 G/DL (ref 33–37)
MCV RBC AUTO: 86.6 FL (ref 80–94)
MONOCYTES # BLD AUTO: 0.54 10*3/MM3 (ref 0.1–0.9)
MONOCYTES NFR BLD AUTO: 4.6 % (ref 0–10)
NEUTROPHILS # BLD AUTO: 8.26 10*3/MM3 (ref 1.4–6.5)
NEUTROPHILS NFR BLD AUTO: 69.6 % (ref 30–70)
OSMOLALITY SERPL CALC.SUM OF ELEC: 277.7 MOSM/KG (ref 273–305)
PLATELET # BLD AUTO: 635 10*3/MM3 (ref 130–400)
PMV BLD AUTO: 9.5 FL (ref 6–10)
POTASSIUM BLD-SCNC: 4.5 MMOL/L (ref 3.5–5.3)
RBC # BLD AUTO: 3.65 10*6/MM3 (ref 4.7–6.1)
SODIUM BLD-SCNC: 140 MMOL/L (ref 135–153)
VANCOMYCIN TROUGH SERPL-MCNC: 25.4 MCG/ML (ref 5–15)
WBC NRBC COR # BLD: 11.85 10*3/MM3 (ref 4.5–12.5)

## 2017-09-25 PROCEDURE — 25010000002 VANCOMYCIN PER 500 MG

## 2017-09-25 PROCEDURE — 82962 GLUCOSE BLOOD TEST: CPT

## 2017-09-25 PROCEDURE — 86140 C-REACTIVE PROTEIN: CPT | Performed by: INTERNAL MEDICINE

## 2017-09-25 PROCEDURE — 85025 COMPLETE CBC W/AUTO DIFF WBC: CPT | Performed by: INTERNAL MEDICINE

## 2017-09-25 PROCEDURE — 99231 SBSQ HOSP IP/OBS SF/LOW 25: CPT | Performed by: SURGERY

## 2017-09-25 PROCEDURE — 99232 SBSQ HOSP IP/OBS MODERATE 35: CPT | Performed by: FAMILY MEDICINE

## 2017-09-25 PROCEDURE — 25010000002 CEFTRIAXONE: Performed by: INTERNAL MEDICINE

## 2017-09-25 PROCEDURE — 25010000002 MAGNESIUM SULFATE 2 GM/50ML SOLUTION: Performed by: INTERNAL MEDICINE

## 2017-09-25 PROCEDURE — 80202 ASSAY OF VANCOMYCIN: CPT | Performed by: INTERNAL MEDICINE

## 2017-09-25 PROCEDURE — 83735 ASSAY OF MAGNESIUM: CPT | Performed by: INTERNAL MEDICINE

## 2017-09-25 PROCEDURE — 80048 BASIC METABOLIC PNL TOTAL CA: CPT | Performed by: INTERNAL MEDICINE

## 2017-09-25 PROCEDURE — 94799 UNLISTED PULMONARY SVC/PX: CPT

## 2017-09-25 RX ORDER — MAGNESIUM SULFATE HEPTAHYDRATE 40 MG/ML
2 INJECTION, SOLUTION INTRAVENOUS ONCE
Status: COMPLETED | OUTPATIENT
Start: 2017-09-25 | End: 2017-09-25

## 2017-09-25 RX ADMIN — TOPIRAMATE 100 MG: 100 TABLET, FILM COATED ORAL at 17:38

## 2017-09-25 RX ADMIN — ARIPIPRAZOLE 10 MG: 10 TABLET ORAL at 20:12

## 2017-09-25 RX ADMIN — BACLOFEN 20 MG: 10 TABLET ORAL at 22:22

## 2017-09-25 RX ADMIN — DULOXETINE HYDROCHLORIDE 60 MG: 60 CAPSULE, DELAYED RELEASE ORAL at 20:12

## 2017-09-25 RX ADMIN — GABAPENTIN 800 MG: 400 CAPSULE ORAL at 04:48

## 2017-09-25 RX ADMIN — BACLOFEN 20 MG: 10 TABLET ORAL at 15:17

## 2017-09-25 RX ADMIN — VANCOMYCIN HYDROCHLORIDE 2000 MG: 5 INJECTION, POWDER, LYOPHILIZED, FOR SOLUTION INTRAVENOUS at 17:38

## 2017-09-25 RX ADMIN — CHOLECALCIFEROL CAP 125 MCG (5000 UNIT) 5000 UNITS: 125 CAP at 08:19

## 2017-09-25 RX ADMIN — TOPIRAMATE 100 MG: 100 TABLET, FILM COATED ORAL at 20:12

## 2017-09-25 RX ADMIN — FAMOTIDINE 20 MG: 20 TABLET, FILM COATED ORAL at 20:12

## 2017-09-25 RX ADMIN — Medication: at 08:19

## 2017-09-25 RX ADMIN — DAKIN'S SOLUTION 0.125% (QUARTER STRENGTH): 0.12 SOLUTION at 20:11

## 2017-09-25 RX ADMIN — CEFTRIAXONE 2 G: 2 INJECTION, POWDER, FOR SOLUTION INTRAMUSCULAR; INTRAVENOUS at 11:46

## 2017-09-25 RX ADMIN — POTASSIUM CHLORIDE 40 MEQ: 1500 TABLET, EXTENDED RELEASE ORAL at 00:27

## 2017-09-25 RX ADMIN — DULOXETINE HYDROCHLORIDE 60 MG: 60 CAPSULE, DELAYED RELEASE ORAL at 08:19

## 2017-09-25 RX ADMIN — SUCRALFATE 1 G: 1 TABLET ORAL at 20:12

## 2017-09-25 RX ADMIN — GABAPENTIN 800 MG: 400 CAPSULE ORAL at 15:17

## 2017-09-25 RX ADMIN — MAGNESIUM SULFATE IN WATER 2 G: 40 INJECTION, SOLUTION INTRAVENOUS at 04:46

## 2017-09-25 RX ADMIN — PANTOPRAZOLE SODIUM 40 MG: 40 TABLET, DELAYED RELEASE ORAL at 04:47

## 2017-09-25 RX ADMIN — TOPIRAMATE 100 MG: 100 TABLET, FILM COATED ORAL at 08:19

## 2017-09-25 RX ADMIN — BACLOFEN 20 MG: 10 TABLET ORAL at 08:19

## 2017-09-25 RX ADMIN — DAKIN'S SOLUTION 0.125% (QUARTER STRENGTH): 0.12 SOLUTION at 08:21

## 2017-09-25 RX ADMIN — Medication 1 TABLET: at 08:19

## 2017-09-25 RX ADMIN — FAMOTIDINE 20 MG: 20 TABLET, FILM COATED ORAL at 08:19

## 2017-09-25 RX ADMIN — GABAPENTIN 800 MG: 400 CAPSULE ORAL at 20:16

## 2017-09-25 RX ADMIN — MODAFINIL 200 MG: 100 TABLET ORAL at 08:19

## 2017-09-26 ENCOUNTER — APPOINTMENT (OUTPATIENT)
Dept: NUCLEAR MEDICINE | Facility: HOSPITAL | Age: 42
End: 2017-09-26

## 2017-09-26 PROBLEM — M86.159: Status: ACTIVE | Noted: 2017-09-26

## 2017-09-26 PROBLEM — E11.9 DM2 (DIABETES MELLITUS, TYPE 2): Status: ACTIVE | Noted: 2017-09-26

## 2017-09-26 LAB
ALBUMIN SERPL-MCNC: 3.6 G/DL (ref 3.5–5)
ALBUMIN/GLOB SERPL: 0.9 G/DL (ref 1.5–2.5)
ALP SERPL-CCNC: 88 U/L (ref 40–129)
ALT SERPL W P-5'-P-CCNC: 29 U/L (ref 10–44)
ANION GAP SERPL CALCULATED.3IONS-SCNC: 7.1 MMOL/L (ref 3.6–11.2)
AST SERPL-CCNC: 18 U/L (ref 10–34)
BACTERIA SPEC AEROBE CULT: NORMAL
BACTERIA SPEC AEROBE CULT: NORMAL
BASOPHILS # BLD AUTO: 0.04 10*3/MM3 (ref 0–0.3)
BASOPHILS NFR BLD AUTO: 0.4 % (ref 0–2)
BILIRUB SERPL-MCNC: 0.2 MG/DL (ref 0.2–1.8)
BUN BLD-MCNC: 7 MG/DL (ref 7–21)
BUN/CREAT SERPL: 8.8 (ref 7–25)
CALCIUM SPEC-SCNC: 9.7 MG/DL (ref 7.7–10)
CHLORIDE SERPL-SCNC: 109 MMOL/L (ref 99–112)
CO2 SERPL-SCNC: 21.9 MMOL/L (ref 24.3–31.9)
CREAT BLD-MCNC: 0.8 MG/DL (ref 0.43–1.29)
CRP SERPL-MCNC: 12.36 MG/DL (ref 0–0.99)
DEPRECATED RDW RBC AUTO: 44.5 FL (ref 37–54)
EOSINOPHIL # BLD AUTO: 0.5 10*3/MM3 (ref 0–0.7)
EOSINOPHIL NFR BLD AUTO: 4.5 % (ref 0–5)
ERYTHROCYTE [DISTWIDTH] IN BLOOD BY AUTOMATED COUNT: 14.7 % (ref 11.5–14.5)
GFR SERPL CREATININE-BSD FRML MDRD: 108 ML/MIN/1.73
GLOBULIN UR ELPH-MCNC: 4.2 GM/DL
GLUCOSE BLD-MCNC: 112 MG/DL (ref 70–110)
GLUCOSE BLDC GLUCOMTR-MCNC: 100 MG/DL (ref 70–130)
GLUCOSE BLDC GLUCOMTR-MCNC: 115 MG/DL (ref 70–130)
GLUCOSE BLDC GLUCOMTR-MCNC: 138 MG/DL (ref 70–130)
GLUCOSE BLDC GLUCOMTR-MCNC: 155 MG/DL (ref 70–130)
HCT VFR BLD AUTO: 31.6 % (ref 42–52)
HGB BLD-MCNC: 9.4 G/DL (ref 14–18)
IMM GRANULOCYTES # BLD: 0.05 10*3/MM3 (ref 0–0.03)
IMM GRANULOCYTES NFR BLD: 0.4 % (ref 0–0.5)
LYMPHOCYTES # BLD AUTO: 2.11 10*3/MM3 (ref 1–3)
LYMPHOCYTES NFR BLD AUTO: 18.8 % (ref 21–51)
MAGNESIUM SERPL-MCNC: 2 MG/DL (ref 1.7–2.6)
MCH RBC QN AUTO: 25.3 PG (ref 27–33)
MCHC RBC AUTO-ENTMCNC: 29.7 G/DL (ref 33–37)
MCV RBC AUTO: 85.2 FL (ref 80–94)
MONOCYTES # BLD AUTO: 0.4 10*3/MM3 (ref 0.1–0.9)
MONOCYTES NFR BLD AUTO: 3.6 % (ref 0–10)
NEUTROPHILS # BLD AUTO: 8.1 10*3/MM3 (ref 1.4–6.5)
NEUTROPHILS NFR BLD AUTO: 72.3 % (ref 30–70)
OSMOLALITY SERPL CALC.SUM OF ELEC: 274.4 MOSM/KG (ref 273–305)
PHOSPHATE SERPL-MCNC: 6.2 MG/DL (ref 2.7–4.5)
PLATELET # BLD AUTO: 673 10*3/MM3 (ref 130–400)
PMV BLD AUTO: 9.2 FL (ref 6–10)
POTASSIUM BLD-SCNC: 3.6 MMOL/L (ref 3.5–5.3)
POTASSIUM BLD-SCNC: 4.2 MMOL/L (ref 3.5–5.3)
PROT SERPL-MCNC: 7.8 G/DL (ref 6–8)
RBC # BLD AUTO: 3.71 10*6/MM3 (ref 4.7–6.1)
SODIUM BLD-SCNC: 138 MMOL/L (ref 135–153)
WBC NRBC COR # BLD: 11.2 10*3/MM3 (ref 4.5–12.5)

## 2017-09-26 PROCEDURE — 25010000002 VANCOMYCIN PER 500 MG

## 2017-09-26 PROCEDURE — 94799 UNLISTED PULMONARY SVC/PX: CPT

## 2017-09-26 PROCEDURE — 25010000002 CEFTRIAXONE: Performed by: INTERNAL MEDICINE

## 2017-09-26 PROCEDURE — 80053 COMPREHEN METABOLIC PANEL: CPT | Performed by: PHYSICIAN ASSISTANT

## 2017-09-26 PROCEDURE — 83735 ASSAY OF MAGNESIUM: CPT | Performed by: PHYSICIAN ASSISTANT

## 2017-09-26 PROCEDURE — 0 TECHNETIUM OXIDRONATE KIT: Performed by: INTERNAL MEDICINE

## 2017-09-26 PROCEDURE — 78315 BONE IMAGING 3 PHASE: CPT

## 2017-09-26 PROCEDURE — 86140 C-REACTIVE PROTEIN: CPT | Performed by: PHYSICIAN ASSISTANT

## 2017-09-26 PROCEDURE — 78315 BONE IMAGING 3 PHASE: CPT | Performed by: RADIOLOGY

## 2017-09-26 PROCEDURE — 84132 ASSAY OF SERUM POTASSIUM: CPT | Performed by: FAMILY MEDICINE

## 2017-09-26 PROCEDURE — 99232 SBSQ HOSP IP/OBS MODERATE 35: CPT | Performed by: FAMILY MEDICINE

## 2017-09-26 PROCEDURE — 82962 GLUCOSE BLOOD TEST: CPT

## 2017-09-26 PROCEDURE — 85025 COMPLETE CBC W/AUTO DIFF WBC: CPT | Performed by: PHYSICIAN ASSISTANT

## 2017-09-26 PROCEDURE — 84100 ASSAY OF PHOSPHORUS: CPT | Performed by: PHYSICIAN ASSISTANT

## 2017-09-26 PROCEDURE — A9561 TC99M OXIDRONATE: HCPCS | Performed by: INTERNAL MEDICINE

## 2017-09-26 RX ORDER — POTASSIUM CHLORIDE 20 MEQ/1
40 TABLET, EXTENDED RELEASE ORAL EVERY 4 HOURS
Status: COMPLETED | OUTPATIENT
Start: 2017-09-26 | End: 2017-09-26

## 2017-09-26 RX ADMIN — GABAPENTIN 800 MG: 400 CAPSULE ORAL at 05:54

## 2017-09-26 RX ADMIN — TECHNETIUM TC 99M OXIDRONATE 1 DOSE: 3.15 INJECTION, POWDER, LYOPHILIZED, FOR SOLUTION INTRAVENOUS at 08:42

## 2017-09-26 RX ADMIN — POTASSIUM CHLORIDE 40 MEQ: 1500 TABLET, EXTENDED RELEASE ORAL at 09:03

## 2017-09-26 RX ADMIN — GABAPENTIN 800 MG: 400 CAPSULE ORAL at 20:46

## 2017-09-26 RX ADMIN — BACLOFEN 20 MG: 10 TABLET ORAL at 15:18

## 2017-09-26 RX ADMIN — GABAPENTIN 800 MG: 400 CAPSULE ORAL at 13:16

## 2017-09-26 RX ADMIN — POTASSIUM CHLORIDE 40 MEQ: 1500 TABLET, EXTENDED RELEASE ORAL at 13:16

## 2017-09-26 RX ADMIN — CHOLECALCIFEROL CAP 125 MCG (5000 UNIT) 5000 UNITS: 125 CAP at 09:03

## 2017-09-26 RX ADMIN — CEFTRIAXONE 2 G: 2 INJECTION, POWDER, FOR SOLUTION INTRAMUSCULAR; INTRAVENOUS at 13:16

## 2017-09-26 RX ADMIN — BACLOFEN 20 MG: 10 TABLET ORAL at 09:04

## 2017-09-26 RX ADMIN — DULOXETINE HYDROCHLORIDE 60 MG: 60 CAPSULE, DELAYED RELEASE ORAL at 20:46

## 2017-09-26 RX ADMIN — Medication 1 TABLET: at 09:03

## 2017-09-26 RX ADMIN — FAMOTIDINE 20 MG: 20 TABLET, FILM COATED ORAL at 09:03

## 2017-09-26 RX ADMIN — DAKIN'S SOLUTION 0.125% (QUARTER STRENGTH): 0.12 SOLUTION at 10:21

## 2017-09-26 RX ADMIN — TOPIRAMATE 100 MG: 100 TABLET, FILM COATED ORAL at 20:46

## 2017-09-26 RX ADMIN — SUCRALFATE 1 G: 1 TABLET ORAL at 20:46

## 2017-09-26 RX ADMIN — TRAZODONE HYDROCHLORIDE 25 MG: 50 TABLET ORAL at 02:27

## 2017-09-26 RX ADMIN — FAMOTIDINE 20 MG: 20 TABLET, FILM COATED ORAL at 20:46

## 2017-09-26 RX ADMIN — ARIPIPRAZOLE 10 MG: 10 TABLET ORAL at 20:46

## 2017-09-26 RX ADMIN — TOPIRAMATE 100 MG: 100 TABLET, FILM COATED ORAL at 15:18

## 2017-09-26 RX ADMIN — MODAFINIL 200 MG: 100 TABLET ORAL at 09:03

## 2017-09-26 RX ADMIN — TOPIRAMATE 100 MG: 100 TABLET, FILM COATED ORAL at 09:03

## 2017-09-26 RX ADMIN — BACLOFEN 20 MG: 10 TABLET ORAL at 20:46

## 2017-09-26 RX ADMIN — PANTOPRAZOLE SODIUM 40 MG: 40 TABLET, DELAYED RELEASE ORAL at 05:55

## 2017-09-26 RX ADMIN — VANCOMYCIN HYDROCHLORIDE 2000 MG: 5 INJECTION, POWDER, LYOPHILIZED, FOR SOLUTION INTRAVENOUS at 16:38

## 2017-09-26 RX ADMIN — DAKIN'S SOLUTION 0.125% (QUARTER STRENGTH): 0.12 SOLUTION at 16:38

## 2017-09-26 RX ADMIN — DULOXETINE HYDROCHLORIDE 60 MG: 60 CAPSULE, DELAYED RELEASE ORAL at 09:03

## 2017-09-27 LAB
ALBUMIN SERPL-MCNC: 3.6 G/DL (ref 3.5–5)
ALBUMIN/GLOB SERPL: 0.9 G/DL (ref 1.5–2.5)
ALP SERPL-CCNC: 85 U/L (ref 40–129)
ALT SERPL W P-5'-P-CCNC: 32 U/L (ref 10–44)
ANION GAP SERPL CALCULATED.3IONS-SCNC: 6.7 MMOL/L (ref 3.6–11.2)
AST SERPL-CCNC: 24 U/L (ref 10–34)
BASOPHILS # BLD AUTO: 0.03 10*3/MM3 (ref 0–0.3)
BASOPHILS NFR BLD AUTO: 0.3 % (ref 0–2)
BILIRUB SERPL-MCNC: 0.2 MG/DL (ref 0.2–1.8)
BUN BLD-MCNC: 8 MG/DL (ref 7–21)
BUN/CREAT SERPL: 9.6 (ref 7–25)
CALCIUM SPEC-SCNC: 9.7 MG/DL (ref 7.7–10)
CHLORIDE SERPL-SCNC: 109 MMOL/L (ref 99–112)
CO2 SERPL-SCNC: 22.3 MMOL/L (ref 24.3–31.9)
CREAT BLD-MCNC: 0.83 MG/DL (ref 0.43–1.29)
CRP SERPL-MCNC: 11.93 MG/DL (ref 0–0.99)
DEPRECATED RDW RBC AUTO: 46.5 FL (ref 37–54)
EOSINOPHIL # BLD AUTO: 0.41 10*3/MM3 (ref 0–0.7)
EOSINOPHIL NFR BLD AUTO: 3.5 % (ref 0–5)
ERYTHROCYTE [DISTWIDTH] IN BLOOD BY AUTOMATED COUNT: 14.9 % (ref 11.5–14.5)
GFR SERPL CREATININE-BSD FRML MDRD: 103 ML/MIN/1.73
GLOBULIN UR ELPH-MCNC: 4.2 GM/DL
GLUCOSE BLD-MCNC: 108 MG/DL (ref 70–110)
GLUCOSE BLDC GLUCOMTR-MCNC: 100 MG/DL (ref 70–130)
GLUCOSE BLDC GLUCOMTR-MCNC: 115 MG/DL (ref 70–130)
GLUCOSE BLDC GLUCOMTR-MCNC: 137 MG/DL (ref 70–130)
GLUCOSE BLDC GLUCOMTR-MCNC: 92 MG/DL (ref 70–130)
HCT VFR BLD AUTO: 33.1 % (ref 42–52)
HGB BLD-MCNC: 9.8 G/DL (ref 14–18)
IMM GRANULOCYTES # BLD: 0.05 10*3/MM3 (ref 0–0.03)
IMM GRANULOCYTES NFR BLD: 0.4 % (ref 0–0.5)
LYMPHOCYTES # BLD AUTO: 2.06 10*3/MM3 (ref 1–3)
LYMPHOCYTES NFR BLD AUTO: 17.8 % (ref 21–51)
MAGNESIUM SERPL-MCNC: 1.8 MG/DL (ref 1.7–2.6)
MCH RBC QN AUTO: 25.1 PG (ref 27–33)
MCHC RBC AUTO-ENTMCNC: 29.6 G/DL (ref 33–37)
MCV RBC AUTO: 84.9 FL (ref 80–94)
MONOCYTES # BLD AUTO: 0.55 10*3/MM3 (ref 0.1–0.9)
MONOCYTES NFR BLD AUTO: 4.7 % (ref 0–10)
NEUTROPHILS # BLD AUTO: 8.5 10*3/MM3 (ref 1.4–6.5)
NEUTROPHILS NFR BLD AUTO: 73.3 % (ref 30–70)
OSMOLALITY SERPL CALC.SUM OF ELEC: 274.5 MOSM/KG (ref 273–305)
PHOSPHATE SERPL-MCNC: 5.5 MG/DL (ref 2.7–4.5)
PLATELET # BLD AUTO: 666 10*3/MM3 (ref 130–400)
PMV BLD AUTO: 9.4 FL (ref 6–10)
POTASSIUM BLD-SCNC: 3.7 MMOL/L (ref 3.5–5.3)
PROT SERPL-MCNC: 7.8 G/DL (ref 6–8)
RBC # BLD AUTO: 3.9 10*6/MM3 (ref 4.7–6.1)
SODIUM BLD-SCNC: 138 MMOL/L (ref 135–153)
WBC NRBC COR # BLD: 11.6 10*3/MM3 (ref 4.5–12.5)

## 2017-09-27 PROCEDURE — 86140 C-REACTIVE PROTEIN: CPT | Performed by: PHYSICIAN ASSISTANT

## 2017-09-27 PROCEDURE — 85025 COMPLETE CBC W/AUTO DIFF WBC: CPT | Performed by: PHYSICIAN ASSISTANT

## 2017-09-27 PROCEDURE — 84100 ASSAY OF PHOSPHORUS: CPT | Performed by: PHYSICIAN ASSISTANT

## 2017-09-27 PROCEDURE — 80053 COMPREHEN METABOLIC PANEL: CPT | Performed by: PHYSICIAN ASSISTANT

## 2017-09-27 PROCEDURE — 82962 GLUCOSE BLOOD TEST: CPT

## 2017-09-27 PROCEDURE — 94799 UNLISTED PULMONARY SVC/PX: CPT

## 2017-09-27 PROCEDURE — 83735 ASSAY OF MAGNESIUM: CPT | Performed by: PHYSICIAN ASSISTANT

## 2017-09-27 PROCEDURE — 99232 SBSQ HOSP IP/OBS MODERATE 35: CPT | Performed by: FAMILY MEDICINE

## 2017-09-27 PROCEDURE — 25010000002 CEFTRIAXONE: Performed by: INTERNAL MEDICINE

## 2017-09-27 PROCEDURE — 25010000002 VANCOMYCIN PER 500 MG

## 2017-09-27 PROCEDURE — 25010000002 PROMETHAZINE PER 50 MG: Performed by: HOSPITALIST

## 2017-09-27 RX ADMIN — GABAPENTIN 800 MG: 400 CAPSULE ORAL at 21:11

## 2017-09-27 RX ADMIN — PROMETHAZINE HYDROCHLORIDE 6.25 MG: 25 INJECTION INTRAMUSCULAR; INTRAVENOUS at 21:10

## 2017-09-27 RX ADMIN — GABAPENTIN 800 MG: 400 CAPSULE ORAL at 04:57

## 2017-09-27 RX ADMIN — VANCOMYCIN HYDROCHLORIDE 2000 MG: 5 INJECTION, POWDER, LYOPHILIZED, FOR SOLUTION INTRAVENOUS at 17:33

## 2017-09-27 RX ADMIN — FAMOTIDINE 20 MG: 20 TABLET, FILM COATED ORAL at 21:11

## 2017-09-27 RX ADMIN — SUCRALFATE 1 G: 1 TABLET ORAL at 21:11

## 2017-09-27 RX ADMIN — DULOXETINE HYDROCHLORIDE 60 MG: 60 CAPSULE, DELAYED RELEASE ORAL at 08:25

## 2017-09-27 RX ADMIN — TOPIRAMATE 100 MG: 100 TABLET, FILM COATED ORAL at 17:33

## 2017-09-27 RX ADMIN — MODAFINIL 200 MG: 100 TABLET ORAL at 08:29

## 2017-09-27 RX ADMIN — PANTOPRAZOLE SODIUM 40 MG: 40 TABLET, DELAYED RELEASE ORAL at 04:57

## 2017-09-27 RX ADMIN — Medication 1 TABLET: at 08:25

## 2017-09-27 RX ADMIN — TOPIRAMATE 100 MG: 100 TABLET, FILM COATED ORAL at 21:11

## 2017-09-27 RX ADMIN — CHOLECALCIFEROL CAP 125 MCG (5000 UNIT) 5000 UNITS: 125 CAP at 08:25

## 2017-09-27 RX ADMIN — FAMOTIDINE 20 MG: 20 TABLET, FILM COATED ORAL at 08:25

## 2017-09-27 RX ADMIN — BACLOFEN 20 MG: 10 TABLET ORAL at 04:57

## 2017-09-27 RX ADMIN — ARIPIPRAZOLE 10 MG: 10 TABLET ORAL at 21:11

## 2017-09-27 RX ADMIN — DAKIN'S SOLUTION 0.125% (QUARTER STRENGTH): 0.12 SOLUTION at 09:21

## 2017-09-27 RX ADMIN — TOPIRAMATE 100 MG: 100 TABLET, FILM COATED ORAL at 08:25

## 2017-09-27 RX ADMIN — DULOXETINE HYDROCHLORIDE 60 MG: 60 CAPSULE, DELAYED RELEASE ORAL at 21:11

## 2017-09-27 RX ADMIN — GABAPENTIN 800 MG: 400 CAPSULE ORAL at 13:31

## 2017-09-27 RX ADMIN — CEFTRIAXONE 2 G: 2 INJECTION, POWDER, FOR SOLUTION INTRAMUSCULAR; INTRAVENOUS at 13:31

## 2017-09-28 LAB
ALBUMIN SERPL-MCNC: 3.8 G/DL (ref 3.5–5)
ALBUMIN/GLOB SERPL: 0.9 G/DL (ref 1.5–2.5)
ALP SERPL-CCNC: 85 U/L (ref 40–129)
ALT SERPL W P-5'-P-CCNC: 32 U/L (ref 10–44)
ANION GAP SERPL CALCULATED.3IONS-SCNC: 10.8 MMOL/L (ref 3.6–11.2)
AST SERPL-CCNC: 24 U/L (ref 10–34)
BASOPHILS # BLD AUTO: 0.03 10*3/MM3 (ref 0–0.3)
BASOPHILS NFR BLD AUTO: 0.2 % (ref 0–2)
BILIRUB SERPL-MCNC: 0.2 MG/DL (ref 0.2–1.8)
BUN BLD-MCNC: 10 MG/DL (ref 7–21)
BUN/CREAT SERPL: 11.1 (ref 7–25)
CALCIUM SPEC-SCNC: 9.7 MG/DL (ref 7.7–10)
CHLORIDE SERPL-SCNC: 108 MMOL/L (ref 99–112)
CO2 SERPL-SCNC: 21.2 MMOL/L (ref 24.3–31.9)
CREAT BLD-MCNC: 0.9 MG/DL (ref 0.43–1.29)
CRP SERPL-MCNC: 12.23 MG/DL (ref 0–0.99)
DEPRECATED RDW RBC AUTO: 46.3 FL (ref 37–54)
EOSINOPHIL # BLD AUTO: 0.39 10*3/MM3 (ref 0–0.7)
EOSINOPHIL NFR BLD AUTO: 3.1 % (ref 0–5)
ERYTHROCYTE [DISTWIDTH] IN BLOOD BY AUTOMATED COUNT: 14.9 % (ref 11.5–14.5)
GFR SERPL CREATININE-BSD FRML MDRD: 94 ML/MIN/1.73
GLOBULIN UR ELPH-MCNC: 4.4 GM/DL
GLUCOSE BLD-MCNC: 114 MG/DL (ref 70–110)
GLUCOSE BLDC GLUCOMTR-MCNC: 102 MG/DL (ref 70–130)
GLUCOSE BLDC GLUCOMTR-MCNC: 105 MG/DL (ref 70–130)
GLUCOSE BLDC GLUCOMTR-MCNC: 128 MG/DL (ref 70–130)
GLUCOSE BLDC GLUCOMTR-MCNC: 92 MG/DL (ref 70–130)
HCT VFR BLD AUTO: 32.9 % (ref 42–52)
HGB BLD-MCNC: 9.6 G/DL (ref 14–18)
IMM GRANULOCYTES # BLD: 0.05 10*3/MM3 (ref 0–0.03)
IMM GRANULOCYTES NFR BLD: 0.4 % (ref 0–0.5)
LYMPHOCYTES # BLD AUTO: 2.3 10*3/MM3 (ref 1–3)
LYMPHOCYTES NFR BLD AUTO: 18.3 % (ref 21–51)
MAGNESIUM SERPL-MCNC: 1.7 MG/DL (ref 1.7–2.6)
MCH RBC QN AUTO: 25.3 PG (ref 27–33)
MCHC RBC AUTO-ENTMCNC: 29.2 G/DL (ref 33–37)
MCV RBC AUTO: 86.6 FL (ref 80–94)
MONOCYTES # BLD AUTO: 0.59 10*3/MM3 (ref 0.1–0.9)
MONOCYTES NFR BLD AUTO: 4.7 % (ref 0–10)
NEUTROPHILS # BLD AUTO: 9.24 10*3/MM3 (ref 1.4–6.5)
NEUTROPHILS NFR BLD AUTO: 73.3 % (ref 30–70)
OSMOLALITY SERPL CALC.SUM OF ELEC: 279.3 MOSM/KG (ref 273–305)
PHOSPHATE SERPL-MCNC: 5.5 MG/DL (ref 2.7–4.5)
PLATELET # BLD AUTO: 696 10*3/MM3 (ref 130–400)
PMV BLD AUTO: 9 FL (ref 6–10)
POTASSIUM BLD-SCNC: 3.8 MMOL/L (ref 3.5–5.3)
PROT SERPL-MCNC: 8.2 G/DL (ref 6–8)
RBC # BLD AUTO: 3.8 10*6/MM3 (ref 4.7–6.1)
SODIUM BLD-SCNC: 140 MMOL/L (ref 135–153)
VANCOMYCIN TROUGH SERPL-MCNC: 9 MCG/ML (ref 5–15)
WBC NRBC COR # BLD: 12.6 10*3/MM3 (ref 4.5–12.5)

## 2017-09-28 PROCEDURE — 25010000002 VANCOMYCIN PER 500 MG

## 2017-09-28 PROCEDURE — 85025 COMPLETE CBC W/AUTO DIFF WBC: CPT | Performed by: PHYSICIAN ASSISTANT

## 2017-09-28 PROCEDURE — 25010000002 CEFTRIAXONE: Performed by: INTERNAL MEDICINE

## 2017-09-28 PROCEDURE — 99232 SBSQ HOSP IP/OBS MODERATE 35: CPT | Performed by: INTERNAL MEDICINE

## 2017-09-28 PROCEDURE — 83735 ASSAY OF MAGNESIUM: CPT | Performed by: PHYSICIAN ASSISTANT

## 2017-09-28 PROCEDURE — 86140 C-REACTIVE PROTEIN: CPT | Performed by: PHYSICIAN ASSISTANT

## 2017-09-28 PROCEDURE — 94799 UNLISTED PULMONARY SVC/PX: CPT

## 2017-09-28 PROCEDURE — 80202 ASSAY OF VANCOMYCIN: CPT

## 2017-09-28 PROCEDURE — 25010000002 MAGNESIUM SULFATE 2 GM/50ML SOLUTION: Performed by: HOSPITALIST

## 2017-09-28 PROCEDURE — 82962 GLUCOSE BLOOD TEST: CPT

## 2017-09-28 PROCEDURE — 84100 ASSAY OF PHOSPHORUS: CPT | Performed by: PHYSICIAN ASSISTANT

## 2017-09-28 PROCEDURE — 80053 COMPREHEN METABOLIC PANEL: CPT | Performed by: PHYSICIAN ASSISTANT

## 2017-09-28 RX ORDER — MAGNESIUM SULFATE HEPTAHYDRATE 40 MG/ML
2 INJECTION, SOLUTION INTRAVENOUS ONCE
Status: COMPLETED | OUTPATIENT
Start: 2017-09-28 | End: 2017-09-28

## 2017-09-28 RX ORDER — L.ACID,CASEI/B.ANIMAL/S.THERMO 16B CELL
1 CAPSULE ORAL DAILY
Status: DISCONTINUED | OUTPATIENT
Start: 2017-09-28 | End: 2017-09-30 | Stop reason: HOSPADM

## 2017-09-28 RX ADMIN — BACLOFEN 20 MG: 10 TABLET ORAL at 23:18

## 2017-09-28 RX ADMIN — Medication 1 TABLET: at 07:39

## 2017-09-28 RX ADMIN — TOPIRAMATE 100 MG: 100 TABLET, FILM COATED ORAL at 20:35

## 2017-09-28 RX ADMIN — MAGNESIUM SULFATE IN WATER 2 G: 40 INJECTION, SOLUTION INTRAVENOUS at 07:39

## 2017-09-28 RX ADMIN — CHOLECALCIFEROL CAP 125 MCG (5000 UNIT) 5000 UNITS: 125 CAP at 07:40

## 2017-09-28 RX ADMIN — GABAPENTIN 800 MG: 400 CAPSULE ORAL at 12:20

## 2017-09-28 RX ADMIN — FAMOTIDINE 20 MG: 20 TABLET, FILM COATED ORAL at 07:39

## 2017-09-28 RX ADMIN — PANTOPRAZOLE SODIUM 40 MG: 40 TABLET, DELAYED RELEASE ORAL at 05:30

## 2017-09-28 RX ADMIN — GABAPENTIN 800 MG: 400 CAPSULE ORAL at 20:35

## 2017-09-28 RX ADMIN — CEFTRIAXONE 2 G: 2 INJECTION, POWDER, FOR SOLUTION INTRAMUSCULAR; INTRAVENOUS at 12:20

## 2017-09-28 RX ADMIN — TOPIRAMATE 100 MG: 100 TABLET, FILM COATED ORAL at 07:40

## 2017-09-28 RX ADMIN — DULOXETINE HYDROCHLORIDE 60 MG: 60 CAPSULE, DELAYED RELEASE ORAL at 20:35

## 2017-09-28 RX ADMIN — TOPIRAMATE 100 MG: 100 TABLET, FILM COATED ORAL at 16:59

## 2017-09-28 RX ADMIN — GABAPENTIN 800 MG: 400 CAPSULE ORAL at 05:30

## 2017-09-28 RX ADMIN — SUCRALFATE 1 G: 1 TABLET ORAL at 20:35

## 2017-09-28 RX ADMIN — DULOXETINE HYDROCHLORIDE 60 MG: 60 CAPSULE, DELAYED RELEASE ORAL at 07:40

## 2017-09-28 RX ADMIN — VANCOMYCIN HYDROCHLORIDE 2000 MG: 5 INJECTION, POWDER, LYOPHILIZED, FOR SOLUTION INTRAVENOUS at 18:44

## 2017-09-28 RX ADMIN — DAKIN'S SOLUTION 0.125% (QUARTER STRENGTH): 0.12 SOLUTION at 09:10

## 2017-09-28 RX ADMIN — FAMOTIDINE 20 MG: 20 TABLET, FILM COATED ORAL at 20:35

## 2017-09-28 RX ADMIN — MODAFINIL 200 MG: 100 TABLET ORAL at 07:40

## 2017-09-28 RX ADMIN — ARIPIPRAZOLE 10 MG: 10 TABLET ORAL at 20:35

## 2017-09-28 RX ADMIN — Medication 1 CAPSULE: at 16:59

## 2017-09-29 ENCOUNTER — APPOINTMENT (OUTPATIENT)
Dept: GENERAL RADIOLOGY | Facility: HOSPITAL | Age: 42
End: 2017-09-29

## 2017-09-29 LAB
ALBUMIN SERPL-MCNC: 3.7 G/DL (ref 3.5–5)
ALBUMIN/GLOB SERPL: 0.9 G/DL (ref 1.5–2.5)
ALP SERPL-CCNC: 85 U/L (ref 40–129)
ALT SERPL W P-5'-P-CCNC: 36 U/L (ref 10–44)
ANION GAP SERPL CALCULATED.3IONS-SCNC: 7.5 MMOL/L (ref 3.6–11.2)
AST SERPL-CCNC: 25 U/L (ref 10–34)
BASOPHILS # BLD AUTO: 0.03 10*3/MM3 (ref 0–0.3)
BASOPHILS NFR BLD AUTO: 0.2 % (ref 0–2)
BILIRUB SERPL-MCNC: 0.2 MG/DL (ref 0.2–1.8)
BUN BLD-MCNC: 11 MG/DL (ref 7–21)
BUN/CREAT SERPL: 12.6 (ref 7–25)
CALCIUM SPEC-SCNC: 9.8 MG/DL (ref 7.7–10)
CHLORIDE SERPL-SCNC: 108 MMOL/L (ref 99–112)
CO2 SERPL-SCNC: 21.5 MMOL/L (ref 24.3–31.9)
CREAT BLD-MCNC: 0.87 MG/DL (ref 0.43–1.29)
CRP SERPL-MCNC: 12.66 MG/DL (ref 0–0.99)
DEPRECATED RDW RBC AUTO: 46.6 FL (ref 37–54)
EOSINOPHIL # BLD AUTO: 0.42 10*3/MM3 (ref 0–0.7)
EOSINOPHIL NFR BLD AUTO: 3.5 % (ref 0–5)
ERYTHROCYTE [DISTWIDTH] IN BLOOD BY AUTOMATED COUNT: 15 % (ref 11.5–14.5)
GFR SERPL CREATININE-BSD FRML MDRD: 98 ML/MIN/1.73
GLOBULIN UR ELPH-MCNC: 4.3 GM/DL
GLUCOSE BLD-MCNC: 123 MG/DL (ref 70–110)
GLUCOSE BLDC GLUCOMTR-MCNC: 116 MG/DL (ref 70–130)
GLUCOSE BLDC GLUCOMTR-MCNC: 141 MG/DL (ref 70–130)
GLUCOSE BLDC GLUCOMTR-MCNC: 145 MG/DL (ref 70–130)
GLUCOSE BLDC GLUCOMTR-MCNC: 94 MG/DL (ref 70–130)
HCT VFR BLD AUTO: 33 % (ref 42–52)
HGB BLD-MCNC: 9.6 G/DL (ref 14–18)
IMM GRANULOCYTES # BLD: 0.07 10*3/MM3 (ref 0–0.03)
IMM GRANULOCYTES NFR BLD: 0.6 % (ref 0–0.5)
LYMPHOCYTES # BLD AUTO: 2.72 10*3/MM3 (ref 1–3)
LYMPHOCYTES NFR BLD AUTO: 22.5 % (ref 21–51)
MAGNESIUM SERPL-MCNC: 1.9 MG/DL (ref 1.7–2.6)
MCH RBC QN AUTO: 25.2 PG (ref 27–33)
MCHC RBC AUTO-ENTMCNC: 29.1 G/DL (ref 33–37)
MCV RBC AUTO: 86.6 FL (ref 80–94)
MONOCYTES # BLD AUTO: 0.57 10*3/MM3 (ref 0.1–0.9)
MONOCYTES NFR BLD AUTO: 4.7 % (ref 0–10)
NEUTROPHILS # BLD AUTO: 8.29 10*3/MM3 (ref 1.4–6.5)
NEUTROPHILS NFR BLD AUTO: 68.5 % (ref 30–70)
OSMOLALITY SERPL CALC.SUM OF ELEC: 274.6 MOSM/KG (ref 273–305)
PHOSPHATE SERPL-MCNC: 5 MG/DL (ref 2.7–4.5)
PLATELET # BLD AUTO: 701 10*3/MM3 (ref 130–400)
PMV BLD AUTO: 9.2 FL (ref 6–10)
POTASSIUM BLD-SCNC: 3.7 MMOL/L (ref 3.5–5.3)
PROT SERPL-MCNC: 8 G/DL (ref 6–8)
RBC # BLD AUTO: 3.81 10*6/MM3 (ref 4.7–6.1)
SODIUM BLD-SCNC: 137 MMOL/L (ref 135–153)
WBC NRBC COR # BLD: 12.1 10*3/MM3 (ref 4.5–12.5)

## 2017-09-29 PROCEDURE — 25010000002 VANCOMYCIN PER 500 MG

## 2017-09-29 PROCEDURE — 86140 C-REACTIVE PROTEIN: CPT | Performed by: PHYSICIAN ASSISTANT

## 2017-09-29 PROCEDURE — 84100 ASSAY OF PHOSPHORUS: CPT | Performed by: PHYSICIAN ASSISTANT

## 2017-09-29 PROCEDURE — 25010000002 ALTEPLASE 2 MG RECONSTITUTED SOLUTION 1 EACH VIAL: Performed by: PHYSICIAN ASSISTANT

## 2017-09-29 PROCEDURE — 94799 UNLISTED PULMONARY SVC/PX: CPT

## 2017-09-29 PROCEDURE — 99232 SBSQ HOSP IP/OBS MODERATE 35: CPT | Performed by: INTERNAL MEDICINE

## 2017-09-29 PROCEDURE — 83735 ASSAY OF MAGNESIUM: CPT | Performed by: PHYSICIAN ASSISTANT

## 2017-09-29 PROCEDURE — 25010000002 CEFTRIAXONE: Performed by: INTERNAL MEDICINE

## 2017-09-29 PROCEDURE — 74000 XR ABDOMEN KUB: CPT | Performed by: RADIOLOGY

## 2017-09-29 PROCEDURE — 80053 COMPREHEN METABOLIC PANEL: CPT | Performed by: PHYSICIAN ASSISTANT

## 2017-09-29 PROCEDURE — 74000 HC ABDOMEN KUB: CPT

## 2017-09-29 PROCEDURE — 25010000002 MAGNESIUM SULFATE 2 GM/50ML SOLUTION: Performed by: HOSPITALIST

## 2017-09-29 PROCEDURE — 85025 COMPLETE CBC W/AUTO DIFF WBC: CPT | Performed by: PHYSICIAN ASSISTANT

## 2017-09-29 PROCEDURE — 82962 GLUCOSE BLOOD TEST: CPT

## 2017-09-29 RX ORDER — MAGNESIUM SULFATE HEPTAHYDRATE 40 MG/ML
2 INJECTION, SOLUTION INTRAVENOUS ONCE
Status: COMPLETED | OUTPATIENT
Start: 2017-09-29 | End: 2017-09-29

## 2017-09-29 RX ADMIN — MAGNESIUM SULFATE IN WATER 2 G: 40 INJECTION, SOLUTION INTRAVENOUS at 05:32

## 2017-09-29 RX ADMIN — PANTOPRAZOLE SODIUM 40 MG: 40 TABLET, DELAYED RELEASE ORAL at 05:32

## 2017-09-29 RX ADMIN — CHOLECALCIFEROL CAP 125 MCG (5000 UNIT) 5000 UNITS: 125 CAP at 09:01

## 2017-09-29 RX ADMIN — GABAPENTIN 800 MG: 400 CAPSULE ORAL at 20:22

## 2017-09-29 RX ADMIN — SUCRALFATE 1 G: 1 TABLET ORAL at 20:22

## 2017-09-29 RX ADMIN — ARIPIPRAZOLE 10 MG: 10 TABLET ORAL at 20:23

## 2017-09-29 RX ADMIN — ALTEPLASE 2 MG: 2.2 INJECTION, POWDER, LYOPHILIZED, FOR SOLUTION INTRAVENOUS at 16:37

## 2017-09-29 RX ADMIN — CEFTRIAXONE 2 G: 2 INJECTION, POWDER, FOR SOLUTION INTRAMUSCULAR; INTRAVENOUS at 12:27

## 2017-09-29 RX ADMIN — TOPIRAMATE 100 MG: 100 TABLET, FILM COATED ORAL at 20:23

## 2017-09-29 RX ADMIN — DAKIN'S SOLUTION 0.125% (QUARTER STRENGTH): 0.12 SOLUTION at 13:12

## 2017-09-29 RX ADMIN — NYSTATIN 1 APPLICATION: 100000 CREAM TOPICAL at 20:23

## 2017-09-29 RX ADMIN — BACLOFEN 20 MG: 10 TABLET ORAL at 20:22

## 2017-09-29 RX ADMIN — Medication 1 CAPSULE: at 09:00

## 2017-09-29 RX ADMIN — DULOXETINE HYDROCHLORIDE 60 MG: 60 CAPSULE, DELAYED RELEASE ORAL at 09:01

## 2017-09-29 RX ADMIN — VANCOMYCIN HYDROCHLORIDE 2000 MG: 5 INJECTION, POWDER, LYOPHILIZED, FOR SOLUTION INTRAVENOUS at 13:12

## 2017-09-29 RX ADMIN — TOPIRAMATE 100 MG: 100 TABLET, FILM COATED ORAL at 17:27

## 2017-09-29 RX ADMIN — GABAPENTIN 800 MG: 400 CAPSULE ORAL at 13:35

## 2017-09-29 RX ADMIN — DULOXETINE HYDROCHLORIDE 60 MG: 60 CAPSULE, DELAYED RELEASE ORAL at 20:23

## 2017-09-29 RX ADMIN — FAMOTIDINE 20 MG: 20 TABLET, FILM COATED ORAL at 09:01

## 2017-09-29 RX ADMIN — GABAPENTIN 800 MG: 400 CAPSULE ORAL at 05:33

## 2017-09-29 RX ADMIN — DAKIN'S SOLUTION 0.125% (QUARTER STRENGTH): 0.12 SOLUTION at 00:50

## 2017-09-29 RX ADMIN — TOPIRAMATE 100 MG: 100 TABLET, FILM COATED ORAL at 09:01

## 2017-09-29 RX ADMIN — FAMOTIDINE 20 MG: 20 TABLET, FILM COATED ORAL at 20:23

## 2017-09-29 RX ADMIN — Medication 1 TABLET: at 09:01

## 2017-09-29 RX ADMIN — MODAFINIL 200 MG: 100 TABLET ORAL at 09:00

## 2017-09-30 VITALS
WEIGHT: 269.2 LBS | DIASTOLIC BLOOD PRESSURE: 78 MMHG | OXYGEN SATURATION: 99 % | HEART RATE: 70 BPM | RESPIRATION RATE: 20 BRPM | BODY MASS INDEX: 38.54 KG/M2 | HEIGHT: 70 IN | TEMPERATURE: 98.2 F | SYSTOLIC BLOOD PRESSURE: 176 MMHG

## 2017-09-30 LAB
ALBUMIN SERPL-MCNC: 3.6 G/DL (ref 3.5–5)
ALBUMIN/GLOB SERPL: 0.9 G/DL (ref 1.5–2.5)
ALP SERPL-CCNC: 83 U/L (ref 40–129)
ALT SERPL W P-5'-P-CCNC: 37 U/L (ref 10–44)
ANION GAP SERPL CALCULATED.3IONS-SCNC: 5.2 MMOL/L (ref 3.6–11.2)
AST SERPL-CCNC: 23 U/L (ref 10–34)
BASOPHILS # BLD AUTO: 0.02 10*3/MM3 (ref 0–0.3)
BASOPHILS NFR BLD AUTO: 0.2 % (ref 0–2)
BILIRUB SERPL-MCNC: 0.2 MG/DL (ref 0.2–1.8)
BUN BLD-MCNC: 12 MG/DL (ref 7–21)
BUN/CREAT SERPL: 16.4 (ref 7–25)
CALCIUM SPEC-SCNC: 9.3 MG/DL (ref 7.7–10)
CHLORIDE SERPL-SCNC: 108 MMOL/L (ref 99–112)
CO2 SERPL-SCNC: 23.8 MMOL/L (ref 24.3–31.9)
CREAT BLD-MCNC: 0.73 MG/DL (ref 0.43–1.29)
CRP SERPL-MCNC: 11.96 MG/DL (ref 0–0.99)
DEPRECATED RDW RBC AUTO: 46.4 FL (ref 37–54)
EOSINOPHIL # BLD AUTO: 0.38 10*3/MM3 (ref 0–0.7)
EOSINOPHIL NFR BLD AUTO: 3.5 % (ref 0–5)
ERYTHROCYTE [DISTWIDTH] IN BLOOD BY AUTOMATED COUNT: 14.8 % (ref 11.5–14.5)
GFR SERPL CREATININE-BSD FRML MDRD: 120 ML/MIN/1.73
GLOBULIN UR ELPH-MCNC: 4.1 GM/DL
GLUCOSE BLD-MCNC: 114 MG/DL (ref 70–110)
GLUCOSE BLDC GLUCOMTR-MCNC: 104 MG/DL (ref 70–130)
GLUCOSE BLDC GLUCOMTR-MCNC: 106 MG/DL (ref 70–130)
GLUCOSE BLDC GLUCOMTR-MCNC: 92 MG/DL (ref 70–130)
HCT VFR BLD AUTO: 30.9 % (ref 42–52)
HGB BLD-MCNC: 9.2 G/DL (ref 14–18)
IMM GRANULOCYTES # BLD: 0.02 10*3/MM3 (ref 0–0.03)
IMM GRANULOCYTES NFR BLD: 0.2 % (ref 0–0.5)
LYMPHOCYTES # BLD AUTO: 2.24 10*3/MM3 (ref 1–3)
LYMPHOCYTES NFR BLD AUTO: 20.7 % (ref 21–51)
MAGNESIUM SERPL-MCNC: 1.9 MG/DL (ref 1.7–2.6)
MCH RBC QN AUTO: 25.3 PG (ref 27–33)
MCHC RBC AUTO-ENTMCNC: 29.8 G/DL (ref 33–37)
MCV RBC AUTO: 84.9 FL (ref 80–94)
MONOCYTES # BLD AUTO: 0.51 10*3/MM3 (ref 0.1–0.9)
MONOCYTES NFR BLD AUTO: 4.7 % (ref 0–10)
NEUTROPHILS # BLD AUTO: 7.67 10*3/MM3 (ref 1.4–6.5)
NEUTROPHILS NFR BLD AUTO: 70.7 % (ref 30–70)
NRBC BLD MANUAL-RTO: 0 /100 WBC (ref 0–0)
OSMOLALITY SERPL CALC.SUM OF ELEC: 274.4 MOSM/KG (ref 273–305)
PHOSPHATE SERPL-MCNC: 5.3 MG/DL (ref 2.7–4.5)
PLATELET # BLD AUTO: 644 10*3/MM3 (ref 130–400)
PMV BLD AUTO: 9.1 FL (ref 6–10)
POTASSIUM BLD-SCNC: 3.7 MMOL/L (ref 3.5–5.3)
PROT SERPL-MCNC: 7.7 G/DL (ref 6–8)
RBC # BLD AUTO: 3.64 10*6/MM3 (ref 4.7–6.1)
SODIUM BLD-SCNC: 137 MMOL/L (ref 135–153)
WBC NRBC COR # BLD: 10.84 10*3/MM3 (ref 4.5–12.5)

## 2017-09-30 PROCEDURE — 25010000002 CEFTRIAXONE: Performed by: INTERNAL MEDICINE

## 2017-09-30 PROCEDURE — 25010000002 VANCOMYCIN PER 500 MG

## 2017-09-30 PROCEDURE — 85025 COMPLETE CBC W/AUTO DIFF WBC: CPT | Performed by: PHYSICIAN ASSISTANT

## 2017-09-30 PROCEDURE — 94799 UNLISTED PULMONARY SVC/PX: CPT

## 2017-09-30 PROCEDURE — 82962 GLUCOSE BLOOD TEST: CPT

## 2017-09-30 PROCEDURE — 86140 C-REACTIVE PROTEIN: CPT | Performed by: PHYSICIAN ASSISTANT

## 2017-09-30 PROCEDURE — 83735 ASSAY OF MAGNESIUM: CPT | Performed by: PHYSICIAN ASSISTANT

## 2017-09-30 PROCEDURE — 80053 COMPREHEN METABOLIC PANEL: CPT | Performed by: PHYSICIAN ASSISTANT

## 2017-09-30 PROCEDURE — 99239 HOSP IP/OBS DSCHRG MGMT >30: CPT | Performed by: INTERNAL MEDICINE

## 2017-09-30 PROCEDURE — 25010000002 MAGNESIUM SULFATE 2 GM/50ML SOLUTION: Performed by: HOSPITALIST

## 2017-09-30 PROCEDURE — 84100 ASSAY OF PHOSPHORUS: CPT | Performed by: PHYSICIAN ASSISTANT

## 2017-09-30 RX ORDER — MAGNESIUM SULFATE HEPTAHYDRATE 40 MG/ML
2 INJECTION, SOLUTION INTRAVENOUS ONCE
Status: COMPLETED | OUTPATIENT
Start: 2017-09-30 | End: 2017-09-30

## 2017-09-30 RX ORDER — LIDOCAINE HYDROCHLORIDE 10 MG/ML
INJECTION, SOLUTION INFILTRATION; PERINEURAL
Status: COMPLETED
Start: 2017-09-30 | End: 2017-09-30

## 2017-09-30 RX ORDER — POLYETHYLENE GLYCOL 3350 17 G/17G
17 POWDER, FOR SOLUTION ORAL 2 TIMES DAILY
Status: DISCONTINUED | OUTPATIENT
Start: 2017-09-30 | End: 2017-09-30 | Stop reason: HOSPADM

## 2017-09-30 RX ADMIN — VANCOMYCIN HYDROCHLORIDE 2000 MG: 5 INJECTION, POWDER, LYOPHILIZED, FOR SOLUTION INTRAVENOUS at 08:28

## 2017-09-30 RX ADMIN — CHOLECALCIFEROL CAP 125 MCG (5000 UNIT) 5000 UNITS: 125 CAP at 10:03

## 2017-09-30 RX ADMIN — Medication 1 CAPSULE: at 10:01

## 2017-09-30 RX ADMIN — CEFTRIAXONE 2 G: 2 INJECTION, POWDER, FOR SOLUTION INTRAMUSCULAR; INTRAVENOUS at 12:31

## 2017-09-30 RX ADMIN — MAGNESIUM SULFATE IN WATER 2 G: 40 INJECTION, SOLUTION INTRAVENOUS at 05:20

## 2017-09-30 RX ADMIN — MODAFINIL 200 MG: 100 TABLET ORAL at 10:02

## 2017-09-30 RX ADMIN — POLYETHYLENE GLYCOL 3350 17 G: 17 POWDER, FOR SOLUTION ORAL at 00:25

## 2017-09-30 RX ADMIN — TOPIRAMATE 100 MG: 100 TABLET, FILM COATED ORAL at 16:25

## 2017-09-30 RX ADMIN — GABAPENTIN 800 MG: 400 CAPSULE ORAL at 05:06

## 2017-09-30 RX ADMIN — DULOXETINE HYDROCHLORIDE 60 MG: 60 CAPSULE, DELAYED RELEASE ORAL at 10:02

## 2017-09-30 RX ADMIN — PANTOPRAZOLE SODIUM 40 MG: 40 TABLET, DELAYED RELEASE ORAL at 05:06

## 2017-09-30 RX ADMIN — FAMOTIDINE 20 MG: 20 TABLET, FILM COATED ORAL at 10:02

## 2017-09-30 RX ADMIN — TOPIRAMATE 100 MG: 100 TABLET, FILM COATED ORAL at 10:02

## 2017-09-30 RX ADMIN — GABAPENTIN 800 MG: 400 CAPSULE ORAL at 14:54

## 2017-09-30 RX ADMIN — POLYETHYLENE GLYCOL 3350 17 G: 17 POWDER, FOR SOLUTION ORAL at 10:03

## 2017-09-30 RX ADMIN — Medication 1 TABLET: at 10:02

## 2017-10-25 ENCOUNTER — OFFICE VISIT (OUTPATIENT)
Dept: SURGERY | Facility: CLINIC | Age: 42
End: 2017-10-25

## 2017-10-25 VITALS — WEIGHT: 269 LBS | HEIGHT: 70 IN | BODY MASS INDEX: 38.51 KG/M2

## 2017-10-25 DIAGNOSIS — L30.9 DERMATITIS: Primary | ICD-10-CM

## 2017-10-25 DIAGNOSIS — L89.154 DECUBITUS ULCER OF SACRAL REGION, STAGE 4 (HCC): ICD-10-CM

## 2017-10-25 PROCEDURE — 99212 OFFICE O/P EST SF 10 MIN: CPT | Performed by: SURGERY

## 2017-10-26 NOTE — PROGRESS NOTES
Subjective   Ken Krueger is a 42 y.o. male  is here today for follow-up.         Ken Krueger is a 42 y.o. male here for follow up for wound care.  He is paraplegic with large decubitus ulcers of bilateral gluteus regions.  These do go down to the bone and had purulence at the time of recent admission but this has resolved with IV antibiotic therapies and wound VAC.  Unfortunately the wound VAC adhesive is causing severe irritation of the surrounding Skin with inability to obtain constant seal with resultant fluid accumulation.  The wound VAC will be discontinued.  The wound care nurses evaluated the patient and applied twice a day packing to the wound.  He is afebrile and doing well otherwise.          Ken was seen today for wound care.    Diagnoses and all orders for this visit:    Dermatitis  -     hydrocortisone-bacitracin-zinc oxide-nystatin (MAGIC BARRIER) cream 1 application; Apply 1 application topically 3 (Three) Times a Day.    Decubitus ulcer of sacral region, stage 4        Assessment     Ken Krueger is a 42 y.o. male with large decubitus ulcers extending to the underlying bone with improvement of the purulent drainage and possible osteomyelitis.  The wound VAC will be discontinued and he will undergo twice daily packing of his wound.  He'll follow up in 1 month.

## 2017-11-05 ENCOUNTER — HOSPITAL ENCOUNTER (EMERGENCY)
Facility: HOSPITAL | Age: 42
Discharge: HOME | End: 2017-11-05
Attending: FAMILY MEDICINE | Admitting: FAMILY MEDICINE

## 2017-11-05 ENCOUNTER — APPOINTMENT (OUTPATIENT)
Dept: CT IMAGING | Facility: HOSPITAL | Age: 42
End: 2017-11-05

## 2017-11-05 VITALS
BODY MASS INDEX: 38.51 KG/M2 | DIASTOLIC BLOOD PRESSURE: 69 MMHG | RESPIRATION RATE: 16 BRPM | HEART RATE: 71 BPM | WEIGHT: 260 LBS | HEIGHT: 69 IN | TEMPERATURE: 98.2 F | SYSTOLIC BLOOD PRESSURE: 124 MMHG | OXYGEN SATURATION: 92 %

## 2017-11-05 DIAGNOSIS — L08.9 DECUBITUS ULCER, UNSTAGEABLE WITH INFECTION (HCC): Primary | ICD-10-CM

## 2017-11-05 DIAGNOSIS — L89.95 DECUBITUS ULCER, UNSTAGEABLE WITH INFECTION (HCC): Primary | ICD-10-CM

## 2017-11-05 LAB
ALBUMIN SERPL-MCNC: 3.9 G/DL (ref 3.5–5)
ALBUMIN/GLOB SERPL: 0.8 G/DL (ref 1.5–2.5)
ALP SERPL-CCNC: 99 U/L (ref 40–129)
ALT SERPL W P-5'-P-CCNC: 17 U/L (ref 10–44)
ANION GAP SERPL CALCULATED.3IONS-SCNC: 7.6 MMOL/L (ref 3.6–11.2)
APTT PPP: 41.6 SECONDS (ref 23.8–36.1)
AST SERPL-CCNC: 15 U/L (ref 10–34)
BACTERIA UR QL AUTO: ABNORMAL /HPF
BASOPHILS # BLD AUTO: 0.02 10*3/MM3 (ref 0–0.3)
BASOPHILS NFR BLD AUTO: 0.2 % (ref 0–2)
BILIRUB SERPL-MCNC: 0.2 MG/DL (ref 0.2–1.8)
BILIRUB UR QL STRIP: NEGATIVE
BUN BLD-MCNC: 18 MG/DL (ref 7–21)
BUN/CREAT SERPL: 15.8 (ref 7–25)
CALCIUM SPEC-SCNC: 9.5 MG/DL (ref 7.7–10)
CHLORIDE SERPL-SCNC: 110 MMOL/L (ref 99–112)
CLARITY UR: ABNORMAL
CO2 SERPL-SCNC: 22.4 MMOL/L (ref 24.3–31.9)
COLOR UR: ABNORMAL
CREAT BLD-MCNC: 1.14 MG/DL (ref 0.43–1.29)
CRP SERPL-MCNC: 19.01 MG/DL (ref 0–0.99)
D-LACTATE SERPL-SCNC: 1.1 MMOL/L (ref 0.5–2)
DEPRECATED RDW RBC AUTO: 45.4 FL (ref 37–54)
EOSINOPHIL # BLD AUTO: 0.35 10*3/MM3 (ref 0–0.7)
EOSINOPHIL NFR BLD AUTO: 2.8 % (ref 0–5)
ERYTHROCYTE [DISTWIDTH] IN BLOOD BY AUTOMATED COUNT: 15.3 % (ref 11.5–14.5)
ERYTHROCYTE [SEDIMENTATION RATE] IN BLOOD: 92 MM/HR (ref 0–15)
GFR SERPL CREATININE-BSD FRML MDRD: 71 ML/MIN/1.73
GLOBULIN UR ELPH-MCNC: 4.6 GM/DL
GLUCOSE BLD-MCNC: 93 MG/DL (ref 70–110)
GLUCOSE UR STRIP-MCNC: NEGATIVE MG/DL
HCT VFR BLD AUTO: 31.7 % (ref 42–52)
HGB BLD-MCNC: 9.1 G/DL (ref 14–18)
HGB UR QL STRIP.AUTO: ABNORMAL
HYALINE CASTS UR QL AUTO: ABNORMAL /LPF
IMM GRANULOCYTES # BLD: 0.03 10*3/MM3 (ref 0–0.03)
IMM GRANULOCYTES NFR BLD: 0.2 % (ref 0–0.5)
INR PPP: 1.19 (ref 0.9–1.1)
KETONES UR QL STRIP: NEGATIVE
LEUKOCYTE ESTERASE UR QL STRIP.AUTO: ABNORMAL
LYMPHOCYTES # BLD AUTO: 1.91 10*3/MM3 (ref 1–3)
LYMPHOCYTES NFR BLD AUTO: 15.4 % (ref 21–51)
MCH RBC QN AUTO: 23.5 PG (ref 27–33)
MCHC RBC AUTO-ENTMCNC: 28.7 G/DL (ref 33–37)
MCV RBC AUTO: 81.7 FL (ref 80–94)
MONOCYTES # BLD AUTO: 0.79 10*3/MM3 (ref 0.1–0.9)
MONOCYTES NFR BLD AUTO: 6.4 % (ref 0–10)
NEUTROPHILS # BLD AUTO: 9.29 10*3/MM3 (ref 1.4–6.5)
NEUTROPHILS NFR BLD AUTO: 75 % (ref 30–70)
NITRITE UR QL STRIP: POSITIVE
OSMOLALITY SERPL CALC.SUM OF ELEC: 281 MOSM/KG (ref 273–305)
PH UR STRIP.AUTO: 5.5 [PH] (ref 5–8)
PLATELET # BLD AUTO: 584 10*3/MM3 (ref 130–400)
PMV BLD AUTO: 9.4 FL (ref 6–10)
POTASSIUM BLD-SCNC: 3.5 MMOL/L (ref 3.5–5.3)
PROT SERPL-MCNC: 8.5 G/DL (ref 6–8)
PROT UR QL STRIP: ABNORMAL
PROTHROMBIN TIME: 15.3 SECONDS (ref 11–15.4)
RBC # BLD AUTO: 3.88 10*6/MM3 (ref 4.7–6.1)
RBC # UR: ABNORMAL /HPF
REF LAB TEST METHOD: ABNORMAL
SODIUM BLD-SCNC: 140 MMOL/L (ref 135–153)
SP GR UR STRIP: 1.02 (ref 1–1.03)
SQUAMOUS #/AREA URNS HPF: ABNORMAL /HPF
UROBILINOGEN UR QL STRIP: ABNORMAL
WBC NRBC COR # BLD: 12.39 10*3/MM3 (ref 4.5–12.5)
WBC UR QL AUTO: ABNORMAL /HPF
YEAST URNS QL MICRO: ABNORMAL /HPF

## 2017-11-05 PROCEDURE — 25010000002 VANCOMYCIN PER 500 MG: Performed by: NURSE PRACTITIONER

## 2017-11-05 PROCEDURE — 96366 THER/PROPH/DIAG IV INF ADDON: CPT

## 2017-11-05 PROCEDURE — 74176 CT ABD & PELVIS W/O CONTRAST: CPT | Performed by: RADIOLOGY

## 2017-11-05 PROCEDURE — 80053 COMPREHEN METABOLIC PANEL: CPT | Performed by: FAMILY MEDICINE

## 2017-11-05 PROCEDURE — 87077 CULTURE AEROBIC IDENTIFY: CPT | Performed by: FAMILY MEDICINE

## 2017-11-05 PROCEDURE — 96375 TX/PRO/DX INJ NEW DRUG ADDON: CPT

## 2017-11-05 PROCEDURE — 83605 ASSAY OF LACTIC ACID: CPT | Performed by: NURSE PRACTITIONER

## 2017-11-05 PROCEDURE — 36415 COLL VENOUS BLD VENIPUNCTURE: CPT

## 2017-11-05 PROCEDURE — 96361 HYDRATE IV INFUSION ADD-ON: CPT

## 2017-11-05 PROCEDURE — 86140 C-REACTIVE PROTEIN: CPT | Performed by: FAMILY MEDICINE

## 2017-11-05 PROCEDURE — 85652 RBC SED RATE AUTOMATED: CPT | Performed by: FAMILY MEDICINE

## 2017-11-05 PROCEDURE — 99285 EMERGENCY DEPT VISIT HI MDM: CPT

## 2017-11-05 PROCEDURE — 96365 THER/PROPH/DIAG IV INF INIT: CPT

## 2017-11-05 PROCEDURE — 85025 COMPLETE CBC W/AUTO DIFF WBC: CPT | Performed by: FAMILY MEDICINE

## 2017-11-05 PROCEDURE — 87186 SC STD MICRODIL/AGAR DIL: CPT | Performed by: FAMILY MEDICINE

## 2017-11-05 PROCEDURE — 25010000002 PIPERACILLIN-TAZOBACTAM: Performed by: NURSE PRACTITIONER

## 2017-11-05 PROCEDURE — 87040 BLOOD CULTURE FOR BACTERIA: CPT | Performed by: FAMILY MEDICINE

## 2017-11-05 PROCEDURE — 85730 THROMBOPLASTIN TIME PARTIAL: CPT | Performed by: FAMILY MEDICINE

## 2017-11-05 PROCEDURE — 81001 URINALYSIS AUTO W/SCOPE: CPT | Performed by: FAMILY MEDICINE

## 2017-11-05 PROCEDURE — 96367 TX/PROPH/DG ADDL SEQ IV INF: CPT

## 2017-11-05 PROCEDURE — 74176 CT ABD & PELVIS W/O CONTRAST: CPT

## 2017-11-05 PROCEDURE — 85610 PROTHROMBIN TIME: CPT | Performed by: FAMILY MEDICINE

## 2017-11-05 PROCEDURE — 25010000002 ONDANSETRON PER 1 MG

## 2017-11-05 PROCEDURE — 87086 URINE CULTURE/COLONY COUNT: CPT | Performed by: FAMILY MEDICINE

## 2017-11-05 RX ORDER — ACETAMINOPHEN 325 MG/1
TABLET ORAL
Status: COMPLETED
Start: 2017-11-05 | End: 2017-11-05

## 2017-11-05 RX ORDER — MEPERIDINE HYDROCHLORIDE 25 MG/ML
INJECTION INTRAMUSCULAR; INTRAVENOUS; SUBCUTANEOUS
Status: COMPLETED
Start: 2017-11-05 | End: 2017-11-05

## 2017-11-05 RX ORDER — ONDANSETRON 2 MG/ML
INJECTION INTRAMUSCULAR; INTRAVENOUS
Status: COMPLETED
Start: 2017-11-05 | End: 2017-11-05

## 2017-11-05 RX ORDER — SODIUM CHLORIDE 9 MG/ML
INJECTION, SOLUTION INTRAVENOUS
Status: COMPLETED
Start: 2017-11-05 | End: 2017-11-05

## 2017-11-05 RX ADMIN — SODIUM CHLORIDE: 9 INJECTION, SOLUTION INTRAVENOUS at 02:25

## 2017-11-05 RX ADMIN — VANCOMYCIN HYDROCHLORIDE 2000 MG: 5 INJECTION, POWDER, LYOPHILIZED, FOR SOLUTION INTRAVENOUS at 05:23

## 2017-11-05 RX ADMIN — MEPERIDINE HYDROCHLORIDE: 25 INJECTION, SOLUTION INTRAMUSCULAR; INTRAVENOUS; SUBCUTANEOUS at 02:21

## 2017-11-05 RX ADMIN — ACETAMINOPHEN: 325 TABLET ORAL at 02:15

## 2017-11-05 RX ADMIN — PIPERACILLIN SODIUM AND TAZOBACTAM SODIUM 4.5 G: 4; .5 INJECTION, POWDER, FOR SOLUTION INTRAVENOUS at 04:36

## 2017-11-05 RX ADMIN — ONDANSETRON: 2 INJECTION INTRAMUSCULAR; INTRAVENOUS at 02:17

## 2017-11-05 NOTE — DISCHARGE INSTRUCTIONS
Discharge to follow up Monday with Dr. Blair for possible outpatient IV abx treatment and further wound care
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
## 2017-11-06 DIAGNOSIS — L08.9 DECUBITUS ULCER, UNSTAGEABLE WITH INFECTION (HCC): Primary | ICD-10-CM

## 2017-11-06 DIAGNOSIS — L89.95 DECUBITUS ULCER, UNSTAGEABLE WITH INFECTION (HCC): Primary | ICD-10-CM

## 2017-11-07 LAB
BACTERIA SPEC AEROBE CULT: ABNORMAL
BACTERIA SPEC AEROBE CULT: ABNORMAL

## 2017-11-10 LAB
BACTERIA SPEC AEROBE CULT: NORMAL
BACTERIA SPEC AEROBE CULT: NORMAL

## 2017-12-05 ENCOUNTER — APPOINTMENT (OUTPATIENT)
Dept: GENERAL RADIOLOGY | Facility: HOSPITAL | Age: 42
End: 2017-12-05

## 2017-12-05 ENCOUNTER — HOSPITAL ENCOUNTER (INPATIENT)
Facility: HOSPITAL | Age: 42
LOS: 13 days | Discharge: ANOTHER HOSPITAL | End: 2017-12-18
Attending: EMERGENCY MEDICINE | Admitting: INTERNAL MEDICINE

## 2017-12-05 ENCOUNTER — APPOINTMENT (OUTPATIENT)
Dept: CT IMAGING | Facility: HOSPITAL | Age: 42
End: 2017-12-05

## 2017-12-05 DIAGNOSIS — N39.0 UTI (URINARY TRACT INFECTION), BACTERIAL: ICD-10-CM

## 2017-12-05 DIAGNOSIS — R41.82 ALTERED MENTAL STATUS, UNSPECIFIED ALTERED MENTAL STATUS TYPE: Primary | ICD-10-CM

## 2017-12-05 DIAGNOSIS — A49.9 UTI (URINARY TRACT INFECTION), BACTERIAL: ICD-10-CM

## 2017-12-05 LAB
6-ACETYL MORPHINE: NEGATIVE
A-A DO2: 138.4 MMHG (ref 0–300)
A-A DO2: 34.3 MMHG (ref 0–300)
ALBUMIN SERPL-MCNC: 3.9 G/DL (ref 3.5–5)
ALBUMIN/GLOB SERPL: 0.9 G/DL (ref 1.5–2.5)
ALP SERPL-CCNC: 123 U/L (ref 40–129)
ALT SERPL W P-5'-P-CCNC: 23 U/L (ref 10–44)
AMMONIA BLD-SCNC: 39 UMOL/L (ref 16–60)
AMPHET+METHAMPHET UR QL: NEGATIVE
ANION GAP SERPL CALCULATED.3IONS-SCNC: 5 MMOL/L (ref 3.6–11.2)
ANION GAP SERPL CALCULATED.3IONS-SCNC: 9.4 MMOL/L (ref 3.6–11.2)
ANISOCYTOSIS BLD QL: NORMAL
ANISOCYTOSIS BLD QL: NORMAL
APAP SERPL-MCNC: <10 MCG/ML (ref 0–200)
ARTERIAL PATENCY WRIST A: POSITIVE
ARTERIAL PATENCY WRIST A: POSITIVE
AST SERPL-CCNC: 33 U/L (ref 10–34)
ATMOSPHERIC PRESS: 723 MMHG
ATMOSPHERIC PRESS: 723 MMHG
BACTERIA UR QL AUTO: ABNORMAL /HPF
BARBITURATES UR QL SCN: NEGATIVE
BASE EXCESS BLDA CALC-SCNC: -4.3 MMOL/L
BASE EXCESS BLDA CALC-SCNC: -8.4 MMOL/L
BASOPHILS # BLD AUTO: 0.01 10*3/MM3 (ref 0–0.3)
BASOPHILS # BLD AUTO: 0.02 10*3/MM3 (ref 0–0.3)
BASOPHILS NFR BLD AUTO: 0.1 % (ref 0–2)
BASOPHILS NFR BLD AUTO: 0.1 % (ref 0–2)
BDY SITE: ABNORMAL
BDY SITE: ABNORMAL
BENZODIAZ UR QL SCN: NEGATIVE
BILIRUB SERPL-MCNC: 0.3 MG/DL (ref 0.2–1.8)
BILIRUB UR QL STRIP: NEGATIVE
BNP SERPL-MCNC: 191 PG/ML (ref 0–100)
BODY TEMPERATURE: 98.6 C
BODY TEMPERATURE: 98.6 C
BUN BLD-MCNC: 19 MG/DL (ref 7–21)
BUN BLD-MCNC: 22 MG/DL (ref 7–21)
BUN/CREAT SERPL: 14.5 (ref 7–25)
BUN/CREAT SERPL: 22.4 (ref 7–25)
BUPRENORPHINE SERPL-MCNC: NEGATIVE NG/ML
CALCIUM SPEC-SCNC: 8.4 MG/DL (ref 7.7–10)
CALCIUM SPEC-SCNC: 9.9 MG/DL (ref 7.7–10)
CANNABINOIDS SERPL QL: POSITIVE
CHLORIDE SERPL-SCNC: 106 MMOL/L (ref 99–112)
CHLORIDE SERPL-SCNC: 112 MMOL/L (ref 99–112)
CK MB SERPL-CCNC: 3.16 NG/ML (ref 0–5)
CK MB SERPL-CCNC: 6.22 NG/ML (ref 0–5)
CK MB SERPL-RTO: 2.1 % (ref 0–3)
CK MB SERPL-RTO: 2.6 % (ref 0–3)
CK SERPL-CCNC: 123 U/L (ref 24–204)
CK SERPL-CCNC: 290 U/L (ref 24–204)
CLARITY UR: ABNORMAL
CO2 SERPL-SCNC: 18.6 MMOL/L (ref 24.3–31.9)
CO2 SERPL-SCNC: 29 MMOL/L (ref 24.3–31.9)
COCAINE UR QL: NEGATIVE
COHGB MFR BLD: 1 % (ref 0–5)
COHGB MFR BLD: 1.6 % (ref 0–5)
COLOR UR: ABNORMAL
CREAT BLD-MCNC: 0.85 MG/DL (ref 0.43–1.29)
CREAT BLD-MCNC: 1.52 MG/DL (ref 0.43–1.29)
CRP SERPL-MCNC: 25.94 MG/DL (ref 0–0.99)
D-LACTATE SERPL-SCNC: 1.1 MMOL/L (ref 0.5–2)
DEPRECATED RDW RBC AUTO: 45 FL (ref 37–54)
DEPRECATED RDW RBC AUTO: 45.3 FL (ref 37–54)
EOSINOPHIL # BLD AUTO: 0.09 10*3/MM3 (ref 0–0.7)
EOSINOPHIL # BLD AUTO: 0.25 10*3/MM3 (ref 0–0.7)
EOSINOPHIL NFR BLD AUTO: 0.4 % (ref 0–5)
EOSINOPHIL NFR BLD AUTO: 1.7 % (ref 0–5)
ERYTHROCYTE [DISTWIDTH] IN BLOOD BY AUTOMATED COUNT: 15.9 % (ref 11.5–14.5)
ERYTHROCYTE [DISTWIDTH] IN BLOOD BY AUTOMATED COUNT: 16 % (ref 11.5–14.5)
ETHANOL BLD-MCNC: <10 MG/DL
ETHANOL UR QL: <0.01 %
FLUAV AG NPH QL: NEGATIVE
FLUBV AG NPH QL IA: NEGATIVE
GFR SERPL CREATININE-BSD FRML MDRD: 100 ML/MIN/1.73
GFR SERPL CREATININE-BSD FRML MDRD: 51 ML/MIN/1.73
GLOBULIN UR ELPH-MCNC: 4.5 GM/DL
GLUCOSE BLD-MCNC: 89 MG/DL (ref 70–110)
GLUCOSE BLD-MCNC: 95 MG/DL (ref 70–110)
GLUCOSE BLDC GLUCOMTR-MCNC: 94 MG/DL (ref 70–130)
GLUCOSE BLDC GLUCOMTR-MCNC: 99 MG/DL (ref 70–130)
GLUCOSE UR STRIP-MCNC: NEGATIVE MG/DL
HCO3 BLDA-SCNC: 16 MMOL/L (ref 22–26)
HCO3 BLDA-SCNC: 21.5 MMOL/L (ref 22–26)
HCT VFR BLD AUTO: 26.1 % (ref 42–52)
HCT VFR BLD AUTO: 30.4 % (ref 42–52)
HCT VFR BLD CALC: 24 % (ref 42–52)
HCT VFR BLD CALC: 26 % (ref 42–52)
HGB BLD-MCNC: 7.4 G/DL (ref 14–18)
HGB BLD-MCNC: 8.6 G/DL (ref 14–18)
HGB BLDA-MCNC: 8.3 G/DL (ref 12–16)
HGB BLDA-MCNC: 9 G/DL (ref 12–16)
HGB UR QL STRIP.AUTO: ABNORMAL
HOROWITZ INDEX BLD+IHG-RTO: 21 %
HOROWITZ INDEX BLD+IHG-RTO: 40 %
HYALINE CASTS UR QL AUTO: ABNORMAL /LPF
HYPOCHROMIA BLD QL: NORMAL
HYPOCHROMIA BLD QL: NORMAL
IMM GRANULOCYTES # BLD: 0.04 10*3/MM3 (ref 0–0.03)
IMM GRANULOCYTES # BLD: 0.13 10*3/MM3 (ref 0–0.03)
IMM GRANULOCYTES NFR BLD: 0.2 % (ref 0–0.5)
IMM GRANULOCYTES NFR BLD: 0.9 % (ref 0–0.5)
KETONES UR QL STRIP: ABNORMAL
LARGE PLATELETS: NORMAL
LEUKOCYTE ESTERASE UR QL STRIP.AUTO: ABNORMAL
LIPASE SERPL-CCNC: 34 U/L (ref 13–60)
LYMPHOCYTES # BLD AUTO: 0.48 10*3/MM3 (ref 1–3)
LYMPHOCYTES # BLD AUTO: 1.49 10*3/MM3 (ref 1–3)
LYMPHOCYTES NFR BLD AUTO: 3.3 % (ref 21–51)
LYMPHOCYTES NFR BLD AUTO: 7.2 % (ref 21–51)
MAGNESIUM SERPL-MCNC: 1.6 MG/DL (ref 1.7–2.6)
MCH RBC QN AUTO: 22.6 PG (ref 27–33)
MCH RBC QN AUTO: 22.8 PG (ref 27–33)
MCHC RBC AUTO-ENTMCNC: 28.3 G/DL (ref 33–37)
MCHC RBC AUTO-ENTMCNC: 28.4 G/DL (ref 33–37)
MCV RBC AUTO: 79.8 FL (ref 80–94)
MCV RBC AUTO: 80.6 FL (ref 80–94)
METHADONE UR QL SCN: NEGATIVE
METHGB BLD QL: 0.3 % (ref 0–3)
METHGB BLD QL: 0.4 % (ref 0–3)
MICROCYTES BLD QL: NORMAL
MODALITY: ABNORMAL
MODALITY: ABNORMAL
MONOCYTES # BLD AUTO: 0.44 10*3/MM3 (ref 0.1–0.9)
MONOCYTES # BLD AUTO: 0.7 10*3/MM3 (ref 0.1–0.9)
MONOCYTES NFR BLD AUTO: 3 % (ref 0–10)
MONOCYTES NFR BLD AUTO: 3.4 % (ref 0–10)
NEUTROPHILS # BLD AUTO: 13.23 10*3/MM3 (ref 1.4–6.5)
NEUTROPHILS # BLD AUTO: 18.47 10*3/MM3 (ref 1.4–6.5)
NEUTROPHILS NFR BLD AUTO: 88.7 % (ref 30–70)
NEUTROPHILS NFR BLD AUTO: 91 % (ref 30–70)
NITRITE UR QL STRIP: POSITIVE
OPIATES UR QL: NEGATIVE
OSMOLALITY SERPL CALC.SUM OF ELEC: 281.1 MOSM/KG (ref 273–305)
OSMOLALITY SERPL CALC.SUM OF ELEC: 282.5 MOSM/KG (ref 273–305)
OXYCODONE UR QL SCN: NEGATIVE
OXYHGB MFR BLDV: 88.6 % (ref 85–100)
OXYHGB MFR BLDV: 96 % (ref 85–100)
PCO2 BLDA: 28.8 MM HG (ref 35–45)
PCO2 BLDA: 42.2 MM HG (ref 35–45)
PCP UR QL SCN: NEGATIVE
PH BLDA: 7.32 PH UNITS (ref 7.35–7.45)
PH BLDA: 7.36 PH UNITS (ref 7.35–7.45)
PH UR STRIP.AUTO: 5.5 [PH] (ref 5–8)
PHOSPHATE SERPL-MCNC: 4.8 MG/DL (ref 2.7–4.5)
PLATELET # BLD AUTO: 643 10*3/MM3 (ref 130–400)
PLATELET # BLD AUTO: 775 10*3/MM3 (ref 130–400)
PMV BLD AUTO: 8.9 FL (ref 6–10)
PMV BLD AUTO: 9.2 FL (ref 6–10)
PO2 BLDA: 57.1 MM HG (ref 80–100)
PO2 BLDA: 98.9 MM HG (ref 80–100)
POIKILOCYTOSIS BLD QL SMEAR: NORMAL
POTASSIUM BLD-SCNC: 3.4 MMOL/L (ref 3.5–5.3)
POTASSIUM BLD-SCNC: 3.9 MMOL/L (ref 3.5–5.3)
PROT SERPL-MCNC: 8.4 G/DL (ref 6–8)
PROT UR QL STRIP: ABNORMAL
RBC # BLD AUTO: 3.24 10*6/MM3 (ref 4.7–6.1)
RBC # BLD AUTO: 3.81 10*6/MM3 (ref 4.7–6.1)
RBC # UR: ABNORMAL /HPF
REF LAB TEST METHOD: ABNORMAL
SALICYLATES SERPL-MCNC: <1 MG/DL (ref 0–30)
SAO2 % BLDCOA: 90.4 % (ref 90–100)
SAO2 % BLDCOA: 97.3 % (ref 90–100)
SMALL PLATELETS BLD QL SMEAR: NORMAL
SMALL PLATELETS BLD QL SMEAR: NORMAL
SODIUM BLD-SCNC: 140 MMOL/L (ref 135–153)
SODIUM BLD-SCNC: 140 MMOL/L (ref 135–153)
SP GR UR STRIP: 1.02 (ref 1–1.03)
SQUAMOUS #/AREA URNS HPF: ABNORMAL /HPF
TROPONIN I SERPL-MCNC: 0.02 NG/ML
TROPONIN I SERPL-MCNC: 0.04 NG/ML
TSH SERPL DL<=0.05 MIU/L-ACNC: 1.66 MIU/ML (ref 0.55–4.78)
UROBILINOGEN UR QL STRIP: ABNORMAL
WBC NRBC COR # BLD: 14.54 10*3/MM3 (ref 4.5–12.5)
WBC NRBC COR # BLD: 20.81 10*3/MM3 (ref 4.5–12.5)
WBC UR QL AUTO: ABNORMAL /HPF
YEAST URNS QL MICRO: ABNORMAL /HPF

## 2017-12-05 PROCEDURE — 36415 COLL VENOUS BLD VENIPUNCTURE: CPT

## 2017-12-05 PROCEDURE — 70450 CT HEAD/BRAIN W/O DYE: CPT | Performed by: RADIOLOGY

## 2017-12-05 PROCEDURE — 87040 BLOOD CULTURE FOR BACTERIA: CPT | Performed by: EMERGENCY MEDICINE

## 2017-12-05 PROCEDURE — 71010 HC CHEST PA OR AP: CPT

## 2017-12-05 PROCEDURE — 82962 GLUCOSE BLOOD TEST: CPT

## 2017-12-05 PROCEDURE — 84484 ASSAY OF TROPONIN QUANT: CPT | Performed by: EMERGENCY MEDICINE

## 2017-12-05 PROCEDURE — 83880 ASSAY OF NATRIURETIC PEPTIDE: CPT | Performed by: INTERNAL MEDICINE

## 2017-12-05 PROCEDURE — 85025 COMPLETE CBC W/AUTO DIFF WBC: CPT | Performed by: EMERGENCY MEDICINE

## 2017-12-05 PROCEDURE — 82805 BLOOD GASES W/O2 SATURATION: CPT | Performed by: EMERGENCY MEDICINE

## 2017-12-05 PROCEDURE — 80307 DRUG TEST PRSMV CHEM ANLYZR: CPT | Performed by: EMERGENCY MEDICINE

## 2017-12-05 PROCEDURE — 87205 SMEAR GRAM STAIN: CPT | Performed by: PHYSICIAN ASSISTANT

## 2017-12-05 PROCEDURE — 36600 WITHDRAWAL OF ARTERIAL BLOOD: CPT | Performed by: EMERGENCY MEDICINE

## 2017-12-05 PROCEDURE — 94799 UNLISTED PULMONARY SVC/PX: CPT

## 2017-12-05 PROCEDURE — 82805 BLOOD GASES W/O2 SATURATION: CPT | Performed by: INTERNAL MEDICINE

## 2017-12-05 PROCEDURE — 83050 HGB METHEMOGLOBIN QUAN: CPT | Performed by: EMERGENCY MEDICINE

## 2017-12-05 PROCEDURE — 82550 ASSAY OF CK (CPK): CPT | Performed by: INTERNAL MEDICINE

## 2017-12-05 PROCEDURE — 82553 CREATINE MB FRACTION: CPT | Performed by: EMERGENCY MEDICINE

## 2017-12-05 PROCEDURE — 83605 ASSAY OF LACTIC ACID: CPT | Performed by: EMERGENCY MEDICINE

## 2017-12-05 PROCEDURE — 81001 URINALYSIS AUTO W/SCOPE: CPT | Performed by: EMERGENCY MEDICINE

## 2017-12-05 PROCEDURE — 83690 ASSAY OF LIPASE: CPT | Performed by: EMERGENCY MEDICINE

## 2017-12-05 PROCEDURE — 85025 COMPLETE CBC W/AUTO DIFF WBC: CPT | Performed by: INTERNAL MEDICINE

## 2017-12-05 PROCEDURE — 87804 INFLUENZA ASSAY W/OPTIC: CPT | Performed by: EMERGENCY MEDICINE

## 2017-12-05 PROCEDURE — 71010 XR CHEST 1 VW: CPT | Performed by: RADIOLOGY

## 2017-12-05 PROCEDURE — 86140 C-REACTIVE PROTEIN: CPT | Performed by: PHYSICIAN ASSISTANT

## 2017-12-05 PROCEDURE — 82140 ASSAY OF AMMONIA: CPT | Performed by: EMERGENCY MEDICINE

## 2017-12-05 PROCEDURE — 25010000002 VANCOMYCIN PER 500 MG: Performed by: EMERGENCY MEDICINE

## 2017-12-05 PROCEDURE — 87186 SC STD MICRODIL/AGAR DIL: CPT | Performed by: EMERGENCY MEDICINE

## 2017-12-05 PROCEDURE — 84484 ASSAY OF TROPONIN QUANT: CPT | Performed by: INTERNAL MEDICINE

## 2017-12-05 PROCEDURE — 36600 WITHDRAWAL OF ARTERIAL BLOOD: CPT | Performed by: INTERNAL MEDICINE

## 2017-12-05 PROCEDURE — 82550 ASSAY OF CK (CPK): CPT | Performed by: EMERGENCY MEDICINE

## 2017-12-05 PROCEDURE — 70450 CT HEAD/BRAIN W/O DYE: CPT

## 2017-12-05 PROCEDURE — 85007 BL SMEAR W/DIFF WBC COUNT: CPT | Performed by: INTERNAL MEDICINE

## 2017-12-05 PROCEDURE — 80053 COMPREHEN METABOLIC PANEL: CPT | Performed by: EMERGENCY MEDICINE

## 2017-12-05 PROCEDURE — 83050 HGB METHEMOGLOBIN QUAN: CPT | Performed by: INTERNAL MEDICINE

## 2017-12-05 PROCEDURE — 82375 ASSAY CARBOXYHB QUANT: CPT | Performed by: INTERNAL MEDICINE

## 2017-12-05 PROCEDURE — 87086 URINE CULTURE/COLONY COUNT: CPT | Performed by: EMERGENCY MEDICINE

## 2017-12-05 PROCEDURE — 99223 1ST HOSP IP/OBS HIGH 75: CPT | Performed by: INTERNAL MEDICINE

## 2017-12-05 PROCEDURE — 87181 SC STD AGAR DILUTION PER AGT: CPT | Performed by: PHYSICIAN ASSISTANT

## 2017-12-05 PROCEDURE — 87147 CULTURE TYPE IMMUNOLOGIC: CPT | Performed by: PHYSICIAN ASSISTANT

## 2017-12-05 PROCEDURE — 87077 CULTURE AEROBIC IDENTIFY: CPT | Performed by: PHYSICIAN ASSISTANT

## 2017-12-05 PROCEDURE — 94640 AIRWAY INHALATION TREATMENT: CPT

## 2017-12-05 PROCEDURE — 87186 SC STD MICRODIL/AGAR DIL: CPT | Performed by: PHYSICIAN ASSISTANT

## 2017-12-05 PROCEDURE — 93005 ELECTROCARDIOGRAM TRACING: CPT | Performed by: EMERGENCY MEDICINE

## 2017-12-05 PROCEDURE — 82375 ASSAY CARBOXYHB QUANT: CPT | Performed by: EMERGENCY MEDICINE

## 2017-12-05 PROCEDURE — 82553 CREATINE MB FRACTION: CPT | Performed by: INTERNAL MEDICINE

## 2017-12-05 PROCEDURE — 84100 ASSAY OF PHOSPHORUS: CPT | Performed by: PHYSICIAN ASSISTANT

## 2017-12-05 PROCEDURE — 99284 EMERGENCY DEPT VISIT MOD MDM: CPT

## 2017-12-05 PROCEDURE — 84443 ASSAY THYROID STIM HORMONE: CPT | Performed by: EMERGENCY MEDICINE

## 2017-12-05 PROCEDURE — 87077 CULTURE AEROBIC IDENTIFY: CPT | Performed by: EMERGENCY MEDICINE

## 2017-12-05 PROCEDURE — 93010 ELECTROCARDIOGRAM REPORT: CPT | Performed by: INTERNAL MEDICINE

## 2017-12-05 PROCEDURE — 25010000002 ONDANSETRON PER 1 MG: Performed by: EMERGENCY MEDICINE

## 2017-12-05 PROCEDURE — 83735 ASSAY OF MAGNESIUM: CPT | Performed by: PHYSICIAN ASSISTANT

## 2017-12-05 PROCEDURE — 87070 CULTURE OTHR SPECIMN AEROBIC: CPT | Performed by: PHYSICIAN ASSISTANT

## 2017-12-05 PROCEDURE — 85007 BL SMEAR W/DIFF WBC COUNT: CPT | Performed by: EMERGENCY MEDICINE

## 2017-12-05 RX ORDER — LISINOPRIL 10 MG/1
10 TABLET ORAL DAILY
COMMUNITY
End: 2017-12-18 | Stop reason: HOSPADM

## 2017-12-05 RX ORDER — FAMOTIDINE 20 MG/1
20 TABLET, FILM COATED ORAL 2 TIMES DAILY
Status: CANCELLED | OUTPATIENT
Start: 2017-12-06

## 2017-12-05 RX ORDER — METHENAMINE HIPPURATE 1000 MG/1
1 TABLET ORAL 2 TIMES DAILY
Status: CANCELLED | OUTPATIENT
Start: 2017-12-06

## 2017-12-05 RX ORDER — ONDANSETRON 2 MG/ML
4 INJECTION INTRAMUSCULAR; INTRAVENOUS ONCE
Status: COMPLETED | OUTPATIENT
Start: 2017-12-05 | End: 2017-12-05

## 2017-12-05 RX ORDER — ONDANSETRON 2 MG/ML
INJECTION INTRAMUSCULAR; INTRAVENOUS
Status: DISPENSED
Start: 2017-12-05 | End: 2017-12-06

## 2017-12-05 RX ORDER — SODIUM CHLORIDE 9 MG/ML
100 INJECTION, SOLUTION INTRAVENOUS CONTINUOUS
Status: DISCONTINUED | OUTPATIENT
Start: 2017-12-05 | End: 2017-12-11

## 2017-12-05 RX ORDER — ACETAMINOPHEN 325 MG/1
TABLET ORAL
Status: COMPLETED
Start: 2017-12-05 | End: 2017-12-05

## 2017-12-05 RX ORDER — METHENAMINE HIPPURATE 1000 MG/1
1 TABLET ORAL 2 TIMES DAILY
COMMUNITY
End: 2019-10-27 | Stop reason: HOSPADM

## 2017-12-05 RX ORDER — ONDANSETRON HYDROCHLORIDE 8 MG/1
8 TABLET, FILM COATED ORAL EVERY 8 HOURS PRN
COMMUNITY
End: 2017-12-18 | Stop reason: HOSPADM

## 2017-12-05 RX ORDER — IBUPROFEN 600 MG/1
600 TABLET ORAL 3 TIMES DAILY PRN
COMMUNITY
End: 2017-12-18 | Stop reason: HOSPADM

## 2017-12-05 RX ORDER — NITROGLYCERIN 0.4 MG/1
0.4 TABLET SUBLINGUAL
Status: DISCONTINUED | OUTPATIENT
Start: 2017-12-05 | End: 2017-12-18 | Stop reason: HOSPADM

## 2017-12-05 RX ORDER — FAMOTIDINE 10 MG/ML
20 INJECTION, SOLUTION INTRAVENOUS EVERY 12 HOURS SCHEDULED
Status: DISCONTINUED | OUTPATIENT
Start: 2017-12-05 | End: 2017-12-07

## 2017-12-05 RX ORDER — IPRATROPIUM BROMIDE AND ALBUTEROL SULFATE 2.5; .5 MG/3ML; MG/3ML
3 SOLUTION RESPIRATORY (INHALATION)
Status: DISCONTINUED | OUTPATIENT
Start: 2017-12-05 | End: 2017-12-08

## 2017-12-05 RX ORDER — POTASSIUM CHLORIDE 750 MG/1
10 TABLET, FILM COATED, EXTENDED RELEASE ORAL DAILY
COMMUNITY
End: 2017-12-18 | Stop reason: HOSPADM

## 2017-12-05 RX ORDER — TRAZODONE HYDROCHLORIDE 100 MG/1
100 TABLET ORAL NIGHTLY
COMMUNITY
End: 2017-12-18 | Stop reason: HOSPADM

## 2017-12-05 RX ORDER — LISINOPRIL 10 MG/1
10 TABLET ORAL DAILY
Status: CANCELLED | OUTPATIENT
Start: 2017-12-06

## 2017-12-05 RX ORDER — NALOXONE HYDROCHLORIDE 1 MG/ML
2 INJECTION INTRAMUSCULAR; INTRAVENOUS; SUBCUTANEOUS ONCE
Status: COMPLETED | OUTPATIENT
Start: 2017-12-05 | End: 2017-12-05

## 2017-12-05 RX ORDER — SODIUM CHLORIDE 0.9 % (FLUSH) 0.9 %
1-10 SYRINGE (ML) INJECTION AS NEEDED
Status: DISCONTINUED | OUTPATIENT
Start: 2017-12-05 | End: 2017-12-18 | Stop reason: HOSPADM

## 2017-12-05 RX ORDER — ACETAMINOPHEN 325 MG/1
650 TABLET ORAL EVERY 6 HOURS PRN
Status: DISCONTINUED | OUTPATIENT
Start: 2017-12-05 | End: 2017-12-18 | Stop reason: HOSPADM

## 2017-12-05 RX ORDER — SODIUM CHLORIDE 9 MG/ML
125 INJECTION, SOLUTION INTRAVENOUS CONTINUOUS
Status: DISCONTINUED | OUTPATIENT
Start: 2017-12-05 | End: 2017-12-05

## 2017-12-05 RX ADMIN — VANCOMYCIN HYDROCHLORIDE 2500 MG: 5 INJECTION, POWDER, LYOPHILIZED, FOR SOLUTION INTRAVENOUS at 15:15

## 2017-12-05 RX ADMIN — SODIUM CHLORIDE 250 ML/HR: 9 INJECTION, SOLUTION INTRAVENOUS at 22:06

## 2017-12-05 RX ADMIN — ACETAMINOPHEN 650 MG: 325 TABLET ORAL at 21:53

## 2017-12-05 RX ADMIN — SODIUM CHLORIDE 3810 ML: 9 INJECTION, SOLUTION INTRAVENOUS at 14:33

## 2017-12-05 RX ADMIN — METRONIDAZOLE 500 MG: 500 INJECTION, SOLUTION INTRAVENOUS at 22:06

## 2017-12-05 RX ADMIN — SODIUM CHLORIDE 500 ML: 9 INJECTION, SOLUTION INTRAVENOUS at 12:30

## 2017-12-05 RX ADMIN — ONDANSETRON 4 MG: 2 INJECTION, SOLUTION INTRAMUSCULAR; INTRAVENOUS at 12:47

## 2017-12-05 RX ADMIN — AZTREONAM 2 G: 2 INJECTION, POWDER, FOR SOLUTION INTRAMUSCULAR; INTRAVENOUS at 14:50

## 2017-12-05 RX ADMIN — ACETAMINOPHEN 650 MG: 325 TABLET ORAL at 21:54

## 2017-12-05 RX ADMIN — SODIUM CHLORIDE 125 ML/HR: 9 INJECTION, SOLUTION INTRAVENOUS at 12:30

## 2017-12-05 RX ADMIN — FAMOTIDINE 20 MG: 10 INJECTION INTRAVENOUS at 22:17

## 2017-12-05 RX ADMIN — IPRATROPIUM BROMIDE AND ALBUTEROL SULFATE 3 ML: .5; 3 SOLUTION RESPIRATORY (INHALATION) at 21:27

## 2017-12-05 RX ADMIN — NALOXONE HYDROCHLORIDE 2 MG: 1 INJECTION PARENTERAL at 12:30

## 2017-12-05 RX ADMIN — SODIUM CHLORIDE 250 ML/HR: 9 INJECTION, SOLUTION INTRAVENOUS at 18:31

## 2017-12-05 RX ADMIN — METRONIDAZOLE 500 MG: 500 INJECTION, SOLUTION INTRAVENOUS at 14:34
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
## 2017-12-06 LAB
ABO GROUP BLD: NORMAL
ABO GROUP BLD: NORMAL
ALBUMIN SERPL-MCNC: 3 G/DL (ref 3.5–5)
ALBUMIN/GLOB SERPL: 0.8 G/DL (ref 1.5–2.5)
ALP SERPL-CCNC: 93 U/L (ref 40–129)
ALT SERPL W P-5'-P-CCNC: 21 U/L (ref 10–44)
ANION GAP SERPL CALCULATED.3IONS-SCNC: 7.8 MMOL/L (ref 3.6–11.2)
ANISOCYTOSIS BLD QL: NORMAL
AST SERPL-CCNC: 24 U/L (ref 10–34)
BASOPHILS # BLD AUTO: 0.02 10*3/MM3 (ref 0–0.3)
BASOPHILS NFR BLD AUTO: 0.1 % (ref 0–2)
BILIRUB SERPL-MCNC: 0.2 MG/DL (ref 0.2–1.8)
BLD GP AB SCN SERPL QL: NEGATIVE
BUN BLD-MCNC: 15 MG/DL (ref 7–21)
BUN/CREAT SERPL: 19.7 (ref 7–25)
CALCIUM SPEC-SCNC: 8.4 MG/DL (ref 7.7–10)
CHLORIDE SERPL-SCNC: 113 MMOL/L (ref 99–112)
CO2 SERPL-SCNC: 20.2 MMOL/L (ref 24.3–31.9)
CREAT BLD-MCNC: 0.76 MG/DL (ref 0.43–1.29)
CRP SERPL-MCNC: 24.61 MG/DL (ref 0–0.99)
DEPRECATED RDW RBC AUTO: 43.2 FL (ref 37–54)
EOSINOPHIL # BLD AUTO: 0.05 10*3/MM3 (ref 0–0.7)
EOSINOPHIL NFR BLD AUTO: 0.3 % (ref 0–5)
ERYTHROCYTE [DISTWIDTH] IN BLOOD BY AUTOMATED COUNT: 15.8 % (ref 11.5–14.5)
GFR SERPL CREATININE-BSD FRML MDRD: 114 ML/MIN/1.73
GLOBULIN UR ELPH-MCNC: 3.7 GM/DL
GLUCOSE BLD-MCNC: 100 MG/DL (ref 70–110)
HCT VFR BLD AUTO: 23.3 % (ref 42–52)
HCT VFR BLD AUTO: 30.8 % (ref 42–52)
HGB BLD-MCNC: 6.5 G/DL (ref 14–18)
HGB BLD-MCNC: 9.4 G/DL (ref 14–18)
HYPOCHROMIA BLD QL: NORMAL
IMM GRANULOCYTES # BLD: 0.05 10*3/MM3 (ref 0–0.03)
IMM GRANULOCYTES NFR BLD: 0.3 % (ref 0–0.5)
IRON 24H UR-MRATE: 10 MCG/DL (ref 53–167)
IRON SATN MFR SERPL: 4 % (ref 20–50)
LYMPHOCYTES # BLD AUTO: 1.73 10*3/MM3 (ref 1–3)
LYMPHOCYTES NFR BLD AUTO: 10.1 % (ref 21–51)
MAGNESIUM SERPL-MCNC: 1.6 MG/DL (ref 1.7–2.6)
MCH RBC QN AUTO: 22.2 PG (ref 27–33)
MCHC RBC AUTO-ENTMCNC: 27.9 G/DL (ref 33–37)
MCV RBC AUTO: 79.5 FL (ref 80–94)
MICROCYTES BLD QL: NORMAL
MONOCYTES # BLD AUTO: 0.84 10*3/MM3 (ref 0.1–0.9)
MONOCYTES NFR BLD AUTO: 4.9 % (ref 0–10)
NEUTROPHILS # BLD AUTO: 14.39 10*3/MM3 (ref 1.4–6.5)
NEUTROPHILS NFR BLD AUTO: 84.3 % (ref 30–70)
OSMOLALITY SERPL CALC.SUM OF ELEC: 282.2 MOSM/KG (ref 273–305)
PHOSPHATE SERPL-MCNC: 4.4 MG/DL (ref 2.7–4.5)
PLATELET # BLD AUTO: 656 10*3/MM3 (ref 130–400)
PMV BLD AUTO: 8.9 FL (ref 6–10)
POTASSIUM BLD-SCNC: 3.3 MMOL/L (ref 3.5–5.3)
POTASSIUM BLD-SCNC: 4.2 MMOL/L (ref 3.5–5.3)
PROT SERPL-MCNC: 6.7 G/DL (ref 6–8)
RBC # BLD AUTO: 2.93 10*6/MM3 (ref 4.7–6.1)
RH BLD: POSITIVE
RH BLD: POSITIVE
SMALL PLATELETS BLD QL SMEAR: NORMAL
SODIUM BLD-SCNC: 141 MMOL/L (ref 135–153)
TIBC SERPL-MCNC: 243 MCG/DL (ref 241–421)
TROPONIN I SERPL-MCNC: 0.02 NG/ML
TROPONIN I SERPL-MCNC: 0.02 NG/ML
WBC NRBC COR # BLD: 17.08 10*3/MM3 (ref 4.5–12.5)

## 2017-12-06 PROCEDURE — 86920 COMPATIBILITY TEST SPIN: CPT

## 2017-12-06 PROCEDURE — 86900 BLOOD TYPING SEROLOGIC ABO: CPT

## 2017-12-06 PROCEDURE — 84100 ASSAY OF PHOSPHORUS: CPT | Performed by: PHYSICIAN ASSISTANT

## 2017-12-06 PROCEDURE — P9016 RBC LEUKOCYTES REDUCED: HCPCS

## 2017-12-06 PROCEDURE — 25010000002 VANCOMYCIN PER 500 MG

## 2017-12-06 PROCEDURE — 84484 ASSAY OF TROPONIN QUANT: CPT | Performed by: INTERNAL MEDICINE

## 2017-12-06 PROCEDURE — 84132 ASSAY OF SERUM POTASSIUM: CPT | Performed by: INTERNAL MEDICINE

## 2017-12-06 PROCEDURE — 99222 1ST HOSP IP/OBS MODERATE 55: CPT | Performed by: SURGERY

## 2017-12-06 PROCEDURE — 86900 BLOOD TYPING SEROLOGIC ABO: CPT | Performed by: INTERNAL MEDICINE

## 2017-12-06 PROCEDURE — 83735 ASSAY OF MAGNESIUM: CPT | Performed by: PHYSICIAN ASSISTANT

## 2017-12-06 PROCEDURE — 86922 COMPATIBILITY TEST ANTIGLOB: CPT

## 2017-12-06 PROCEDURE — 99233 SBSQ HOSP IP/OBS HIGH 50: CPT | Performed by: INTERNAL MEDICINE

## 2017-12-06 PROCEDURE — 83550 IRON BINDING TEST: CPT | Performed by: PHYSICIAN ASSISTANT

## 2017-12-06 PROCEDURE — 86901 BLOOD TYPING SEROLOGIC RH(D): CPT | Performed by: INTERNAL MEDICINE

## 2017-12-06 PROCEDURE — 83540 ASSAY OF IRON: CPT | Performed by: PHYSICIAN ASSISTANT

## 2017-12-06 PROCEDURE — 94799 UNLISTED PULMONARY SVC/PX: CPT

## 2017-12-06 PROCEDURE — 85025 COMPLETE CBC W/AUTO DIFF WBC: CPT | Performed by: INTERNAL MEDICINE

## 2017-12-06 PROCEDURE — 86850 RBC ANTIBODY SCREEN: CPT | Performed by: INTERNAL MEDICINE

## 2017-12-06 PROCEDURE — 86901 BLOOD TYPING SEROLOGIC RH(D): CPT

## 2017-12-06 PROCEDURE — 85007 BL SMEAR W/DIFF WBC COUNT: CPT | Performed by: INTERNAL MEDICINE

## 2017-12-06 PROCEDURE — 80053 COMPREHEN METABOLIC PANEL: CPT | Performed by: PHYSICIAN ASSISTANT

## 2017-12-06 PROCEDURE — 86140 C-REACTIVE PROTEIN: CPT | Performed by: INTERNAL MEDICINE

## 2017-12-06 PROCEDURE — 85018 HEMOGLOBIN: CPT | Performed by: INTERNAL MEDICINE

## 2017-12-06 PROCEDURE — 85014 HEMATOCRIT: CPT | Performed by: INTERNAL MEDICINE

## 2017-12-06 PROCEDURE — 36430 TRANSFUSION BLD/BLD COMPNT: CPT

## 2017-12-06 RX ORDER — TRAZODONE HYDROCHLORIDE 50 MG/1
100 TABLET ORAL NIGHTLY
Status: CANCELLED | OUTPATIENT
Start: 2017-12-06

## 2017-12-06 RX ORDER — IBUPROFEN 600 MG/1
600 TABLET ORAL 3 TIMES DAILY PRN
Status: CANCELLED | OUTPATIENT
Start: 2017-12-06

## 2017-12-06 RX ORDER — METHENAMINE HIPPURATE 1000 MG/1
1 TABLET ORAL 2 TIMES DAILY
Status: DISCONTINUED | OUTPATIENT
Start: 2017-12-06 | End: 2017-12-18 | Stop reason: HOSPADM

## 2017-12-06 RX ORDER — ONDANSETRON 4 MG/1
8 TABLET, FILM COATED ORAL EVERY 8 HOURS PRN
Status: CANCELLED | OUTPATIENT
Start: 2017-12-06

## 2017-12-06 RX ORDER — POTASSIUM CHLORIDE 750 MG/1
10 TABLET, FILM COATED, EXTENDED RELEASE ORAL DAILY
Status: CANCELLED | OUTPATIENT
Start: 2017-12-06

## 2017-12-06 RX ORDER — POTASSIUM CHLORIDE 1.5 G/1.77G
40 POWDER, FOR SOLUTION ORAL AS NEEDED
Status: DISCONTINUED | OUTPATIENT
Start: 2017-12-06 | End: 2017-12-18 | Stop reason: HOSPADM

## 2017-12-06 RX ORDER — SODIUM HYPOCHLORITE 1.25 MG/ML
SOLUTION TOPICAL 3 TIMES DAILY
Status: DISCONTINUED | OUTPATIENT
Start: 2017-12-06 | End: 2017-12-18 | Stop reason: HOSPADM

## 2017-12-06 RX ORDER — MAGNESIUM SULFATE HEPTAHYDRATE 40 MG/ML
2 INJECTION, SOLUTION INTRAVENOUS AS NEEDED
Status: DISCONTINUED | OUTPATIENT
Start: 2017-12-06 | End: 2017-12-18 | Stop reason: HOSPADM

## 2017-12-06 RX ORDER — SODIUM HYPOCHLORITE 1.25 MG/ML
SOLUTION TOPICAL 2 TIMES DAILY
Status: DISCONTINUED | OUTPATIENT
Start: 2017-12-06 | End: 2017-12-06

## 2017-12-06 RX ORDER — MAGNESIUM SULFATE HEPTAHYDRATE 40 MG/ML
4 INJECTION, SOLUTION INTRAVENOUS ONCE
Status: COMPLETED | OUTPATIENT
Start: 2017-12-06 | End: 2017-12-06

## 2017-12-06 RX ORDER — ARIPIPRAZOLE 10 MG/1
10 TABLET ORAL NIGHTLY
Status: DISCONTINUED | OUTPATIENT
Start: 2017-12-06 | End: 2017-12-18 | Stop reason: HOSPADM

## 2017-12-06 RX ORDER — NYSTATIN 100000 [USP'U]/G
1 POWDER TOPICAL 3 TIMES DAILY PRN
Status: DISCONTINUED | OUTPATIENT
Start: 2017-12-06 | End: 2017-12-18 | Stop reason: HOSPADM

## 2017-12-06 RX ORDER — ZINC OXIDE 20 %
OINTMENT (GRAM) TOPICAL 3 TIMES DAILY
Status: DISCONTINUED | OUTPATIENT
Start: 2017-12-06 | End: 2017-12-18 | Stop reason: HOSPADM

## 2017-12-06 RX ORDER — MAGNESIUM HYDROXIDE 1200 MG/15ML
LIQUID ORAL
Status: COMPLETED
Start: 2017-12-06 | End: 2017-12-06

## 2017-12-06 RX ORDER — BACLOFEN 10 MG/1
30 TABLET ORAL 4 TIMES DAILY PRN
Status: CANCELLED | OUTPATIENT
Start: 2017-12-06

## 2017-12-06 RX ORDER — SUCRALFATE 1 G/1
1 TABLET ORAL
Status: DISCONTINUED | OUTPATIENT
Start: 2017-12-06 | End: 2017-12-18 | Stop reason: HOSPADM

## 2017-12-06 RX ORDER — POTASSIUM CHLORIDE 20 MEQ/1
40 TABLET, EXTENDED RELEASE ORAL EVERY 4 HOURS
Status: COMPLETED | OUTPATIENT
Start: 2017-12-06 | End: 2017-12-06

## 2017-12-06 RX ORDER — GABAPENTIN 300 MG/1
600 CAPSULE ORAL EVERY 8 HOURS SCHEDULED
Status: DISCONTINUED | OUTPATIENT
Start: 2017-12-06 | End: 2017-12-13

## 2017-12-06 RX ORDER — POTASSIUM CHLORIDE 7.45 MG/ML
10 INJECTION INTRAVENOUS
Status: DISCONTINUED | OUTPATIENT
Start: 2017-12-06 | End: 2017-12-06

## 2017-12-06 RX ORDER — TOPIRAMATE 100 MG/1
100 TABLET, FILM COATED ORAL 3 TIMES DAILY
Status: DISCONTINUED | OUTPATIENT
Start: 2017-12-06 | End: 2017-12-18 | Stop reason: HOSPADM

## 2017-12-06 RX ORDER — DULOXETIN HYDROCHLORIDE 60 MG/1
60 CAPSULE, DELAYED RELEASE ORAL 2 TIMES DAILY
Status: DISCONTINUED | OUTPATIENT
Start: 2017-12-06 | End: 2017-12-18 | Stop reason: HOSPADM

## 2017-12-06 RX ORDER — ALBUTEROL SULFATE 2.5 MG/3ML
2.5 SOLUTION RESPIRATORY (INHALATION) EVERY 6 HOURS PRN
Status: DISCONTINUED | OUTPATIENT
Start: 2017-12-06 | End: 2017-12-15

## 2017-12-06 RX ORDER — POTASSIUM CHLORIDE 7.45 MG/ML
10 INJECTION INTRAVENOUS
Status: DISCONTINUED | OUTPATIENT
Start: 2017-12-06 | End: 2017-12-18 | Stop reason: HOSPADM

## 2017-12-06 RX ORDER — POTASSIUM CHLORIDE 750 MG/1
40 CAPSULE, EXTENDED RELEASE ORAL AS NEEDED
Status: DISCONTINUED | OUTPATIENT
Start: 2017-12-06 | End: 2017-12-18 | Stop reason: HOSPADM

## 2017-12-06 RX ORDER — PANTOPRAZOLE SODIUM 40 MG/1
40 TABLET, DELAYED RELEASE ORAL EVERY MORNING
Status: DISCONTINUED | OUTPATIENT
Start: 2017-12-06 | End: 2017-12-18 | Stop reason: HOSPADM

## 2017-12-06 RX ORDER — MODAFINIL 100 MG/1
200 TABLET ORAL DAILY
Status: CANCELLED | OUTPATIENT
Start: 2017-12-06

## 2017-12-06 RX ORDER — NYSTATIN 100000 U/G
1 CREAM TOPICAL EVERY 8 HOURS SCHEDULED
Status: CANCELLED | OUTPATIENT
Start: 2017-12-06

## 2017-12-06 RX ORDER — MAGNESIUM SULFATE HEPTAHYDRATE 40 MG/ML
4 INJECTION, SOLUTION INTRAVENOUS AS NEEDED
Status: DISCONTINUED | OUTPATIENT
Start: 2017-12-06 | End: 2017-12-18 | Stop reason: HOSPADM

## 2017-12-06 RX ADMIN — METRONIDAZOLE 500 MG: 500 INJECTION, SOLUTION INTRAVENOUS at 05:27

## 2017-12-06 RX ADMIN — FAMOTIDINE 20 MG: 10 INJECTION INTRAVENOUS at 08:21

## 2017-12-06 RX ADMIN — GABAPENTIN 600 MG: 300 CAPSULE ORAL at 14:38

## 2017-12-06 RX ADMIN — ZINC OXIDE: 200 OINTMENT TOPICAL at 21:15

## 2017-12-06 RX ADMIN — VANCOMYCIN HYDROCHLORIDE 2000 MG: 5 INJECTION, POWDER, LYOPHILIZED, FOR SOLUTION INTRAVENOUS at 13:26

## 2017-12-06 RX ADMIN — MAGNESIUM SULFATE HEPTAHYDRATE 4 G: 40 INJECTION, SOLUTION INTRAVENOUS at 08:21

## 2017-12-06 RX ADMIN — TOPIRAMATE 100 MG: 100 TABLET, FILM COATED ORAL at 21:13

## 2017-12-06 RX ADMIN — AZTREONAM 2 G: 2 INJECTION, POWDER, FOR SOLUTION INTRAMUSCULAR; INTRAVENOUS at 23:50

## 2017-12-06 RX ADMIN — DULOXETINE HYDROCHLORIDE 60 MG: 60 CAPSULE, DELAYED RELEASE ORAL at 17:15

## 2017-12-06 RX ADMIN — TOPIRAMATE 100 MG: 100 TABLET, FILM COATED ORAL at 15:31

## 2017-12-06 RX ADMIN — ZINC OXIDE: 200 OINTMENT TOPICAL at 15:31

## 2017-12-06 RX ADMIN — METRONIDAZOLE 500 MG: 500 INJECTION, SOLUTION INTRAVENOUS at 21:13

## 2017-12-06 RX ADMIN — SUCRALFATE 1 G: 1 TABLET ORAL at 10:47

## 2017-12-06 RX ADMIN — GABAPENTIN 600 MG: 300 CAPSULE ORAL at 21:27

## 2017-12-06 RX ADMIN — METRONIDAZOLE 500 MG: 500 INJECTION, SOLUTION INTRAVENOUS at 15:30

## 2017-12-06 RX ADMIN — POTASSIUM CHLORIDE 40 MEQ: 1500 TABLET, EXTENDED RELEASE ORAL at 12:19

## 2017-12-06 RX ADMIN — IPRATROPIUM BROMIDE AND ALBUTEROL SULFATE 3 ML: .5; 3 SOLUTION RESPIRATORY (INHALATION) at 07:38

## 2017-12-06 RX ADMIN — FAMOTIDINE 20 MG: 10 INJECTION INTRAVENOUS at 21:13

## 2017-12-06 RX ADMIN — SUCRALFATE 1 G: 1 TABLET ORAL at 21:13

## 2017-12-06 RX ADMIN — ALBUTEROL SULFATE 2.5 MG: 2.5 SOLUTION RESPIRATORY (INHALATION) at 19:27

## 2017-12-06 RX ADMIN — TOPIRAMATE 100 MG: 100 TABLET, FILM COATED ORAL at 10:47

## 2017-12-06 RX ADMIN — SUCRALFATE 1 G: 1 TABLET ORAL at 17:15

## 2017-12-06 RX ADMIN — DULOXETINE HYDROCHLORIDE 60 MG: 60 CAPSULE, DELAYED RELEASE ORAL at 10:47

## 2017-12-06 RX ADMIN — SODIUM CHLORIDE 1000 ML: 900 IRRIGANT IRRIGATION at 03:47

## 2017-12-06 RX ADMIN — POTASSIUM CHLORIDE 40 MEQ: 1500 TABLET, EXTENDED RELEASE ORAL at 08:20

## 2017-12-06 RX ADMIN — SODIUM HYPOCHLORITE: 1.25 SOLUTION TOPICAL at 16:35

## 2017-12-06 RX ADMIN — PANTOPRAZOLE SODIUM 40 MG: 40 TABLET, DELAYED RELEASE ORAL at 10:47

## 2017-12-06 RX ADMIN — SODIUM CHLORIDE 250 ML/HR: 9 INJECTION, SOLUTION INTRAVENOUS at 03:47

## 2017-12-06 RX ADMIN — ARIPIPRAZOLE 10 MG: 10 TABLET ORAL at 21:13

## 2017-12-06 NOTE — PLAN OF CARE
Problem: Patient Care Overview (Adult)  Goal: Plan of Care Review  Outcome: Ongoing (interventions implemented as appropriate)    12/05/17 1947   Coping/Psychosocial Response Interventions   Plan Of Care Reviewed With patient   Patient Care Overview   Progress no change         Problem: Fall Risk (Adult)  Goal: Identify Related Risk Factors and Signs and Symptoms  Outcome: Ongoing (interventions implemented as appropriate)    12/05/17 1947   Fall Risk   Fall Risk: Related Risk Factors age-related changes;gait/mobility problems;environment unfamiliar   Fall Risk: Signs and Symptoms presence of risk factors         Problem: Skin Integrity Impairment, Risk/Actual (Adult)  Goal: Identify Related Risk Factors and Signs and Symptoms  Outcome: Ongoing (interventions implemented as appropriate)    12/05/17 1947   Skin Integrity Impairment, Risk/Actual   Skin Integrity Impairment, Risk/Actual: Related Risk Factors cognitive impairment;immobility   Signs and Symptoms (Skin Integrity Impairment) other (see comments)  (Pressure ulcers)         Problem: Pressure Ulcer Risk (Alejandro Scale) (Adult,Obstetrics,Pediatric)  Goal: Identify Related Risk Factors and Signs and Symptoms  Outcome: Ongoing (interventions implemented as appropriate)    12/05/17 1947   Pressure Ulcer Risk (Alejandro Scale)   Related Risk Factors (Pressure Ulcer Risk (Alejandro Scale)) hospitalization prolonged;cognitive impairment;infection;mobility impaired         Problem: Infection, Risk/Actual (Adult)  Goal: Identify Related Risk Factors and Signs and Symptoms  Outcome: Ongoing (interventions implemented as appropriate)    12/05/17 1947   Infection, Risk/Actual   Infection, Risk/Actual: Related Risk Factors prolonged hospitalization;poor personal hygiene;skin integrity impairment;treatment plan         Problem: Pressure Ulcer (Adult)  Goal: Signs and Symptoms of Listed Potential Problems Will be Absent or Manageable (Pressure Ulcer)  Outcome: Ongoing  (interventions implemented as appropriate)    12/05/17 1947   Pressure Ulcer   Problems Assessed (Pressure Ulcer) all;infection   Problems Present (Pressure Ulcer) none         Problem: Self-Care Deficit (Adult,Obstetrics,Pediatric)  Goal: Identify Related Risk Factors and Signs and Symptoms  Outcome: Ongoing (interventions implemented as appropriate)    12/05/17 1947   Self-Care Deficit   Self-Care Deficit: Related Risk Factors activity intolerance;disease process;immobility   Signs and Symptoms (Self-Care Deficit) cognitive changes         Problem: Confusion, Acute (Adult)  Goal: Identify Related Risk Factors and Signs and Symptoms  Outcome: Ongoing (interventions implemented as appropriate)    12/05/17 1947   Confusion, Acute   Related Risk Factors (Acute Confusion) infection;mobility decreased   Signs and Symptoms (Acute Confusion) disorientation

## 2017-12-06 NOTE — PLAN OF CARE
Problem: Patient Care Overview (Adult)  Goal: Plan of Care Review  Outcome: Ongoing (interventions implemented as appropriate)  Goal: Discharge Needs Assessment  Outcome: Ongoing (interventions implemented as appropriate)    Problem: Fall Risk (Adult)  Goal: Identify Related Risk Factors and Signs and Symptoms  Outcome: Ongoing (interventions implemented as appropriate)  Goal: Absence of Falls  Outcome: Ongoing (interventions implemented as appropriate)    Problem: Skin Integrity Impairment, Risk/Actual (Adult)  Goal: Identify Related Risk Factors and Signs and Symptoms  Outcome: Ongoing (interventions implemented as appropriate)  Goal: Skin Integrity/Wound Healing  Outcome: Ongoing (interventions implemented as appropriate)    Problem: Pressure Ulcer Risk (Alejandro Scale) (Adult,Obstetrics,Pediatric)  Goal: Identify Related Risk Factors and Signs and Symptoms  Outcome: Ongoing (interventions implemented as appropriate)  Goal: Skin Integrity  Outcome: Ongoing (interventions implemented as appropriate)    Problem: Infection, Risk/Actual (Adult)  Goal: Identify Related Risk Factors and Signs and Symptoms  Outcome: Ongoing (interventions implemented as appropriate)  Goal: Infection Prevention/Resolution  Outcome: Ongoing (interventions implemented as appropriate)    Problem: Pressure Ulcer (Adult)  Goal: Signs and Symptoms of Listed Potential Problems Will be Absent or Manageable (Pressure Ulcer)  Outcome: Ongoing (interventions implemented as appropriate)    Problem: Self-Care Deficit (Adult,Obstetrics,Pediatric)  Goal: Identify Related Risk Factors and Signs and Symptoms  Outcome: Ongoing (interventions implemented as appropriate)  Goal: Improved Ability to Perform BADL and IADL  Outcome: Ongoing (interventions implemented as appropriate)    Problem: Confusion, Acute (Adult)  Goal: Identify Related Risk Factors and Signs and Symptoms  Outcome: Ongoing (interventions implemented as appropriate)  Goal:  Cognitive/Functional Impairments Minimized  Outcome: Ongoing (interventions implemented as appropriate)  Goal: Safety  Outcome: Ongoing (interventions implemented as appropriate)

## 2017-12-06 NOTE — CONSULTS
Nutrition Services    Patient Name:  Ken Krueger  YOB: 1975  MRN: [id number]  Admit Date:  12/5/2017    Po intakes not yet established, pressure ulcers noted.  WILL ADD ENSURE ENLIVE BID for added kcal/protein for healing.  ALSO RECOMMEND: ADDING MVI DAILY to aid in healing.  RD will follow.    Electronically signed by:  Veronique Estrada RD  12/06/17 2:17 PM
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
## 2017-12-06 NOTE — PROGRESS NOTES
Discharge Planning Assessment   Fabricio     Patient Name: Ken Krueger  MRN: [id number]  Today's Date: 12/6/2017    Admit Date: 12/5/2017          Discharge Needs Assessment       12/06/17 1120    Living Environment    Lives With sibling(s)   SS spoke with pt and pt's brother Pineda on this date. Pt lives at home with brother and plans to return home at discharge.     Transportation Available car   Pt's brother to provide transportation for pt at discharge.     Living Environment    Quality Of Family Relationships supportive    Able to Return to Prior Living Arrangements yes    Discharge Needs Assessment    Equipment Currently Used at Home wheelchair, motorized;hospital bed;grab bar    Equipment Needed After Discharge none            Discharge Plan       12/06/17 1122    Case Management/Social Work Plan    Plan Pt lives at home and plans to return home at discharge. Pt utilizes [facility]. HH to be contact at discharge. Pt utilizes [facility]. With no issues. SS will follow and assist as needed.     Patient/Family In Agreement With Plan yes        Discharge Placement     No information found                Demographic Summary       12/06/17 1119    Referral Information    Referral Source nursing    Reason For Consult discharge planning   Ostomy supplies... may need home health.             Functional Status     None            Psychosocial     None            Abuse/Neglect     None            Legal     None            Substance Abuse     None            Patient Forms     None          Lin Núñez

## 2017-12-06 NOTE — PROGRESS NOTES
Pharmacy was consulted for vancomycin dosing for sepsis.  Renal function has improved to baseline today.  Based on pt parameters and dosing history will initiate vancomycin at 2g iv q12hrs to target trough levels of 15-20 mg/L.  Pharmacy will continue to follow and obtain trough level once steady state is achieved.  Thank you.

## 2017-12-06 NOTE — NURSING NOTE
Stage 3 to coccyx measuring 2x2x0.2cm.  Stage 4 to right buttock measuring [id number].  Stage 4 to left buttock measuring [id number].  Copious serosanguinous noted.  Treatment ordered.

## 2017-12-06 NOTE — PROGRESS NOTES
[facility] HOSPITALIST PROGRESS NOTE     Patient Identification:  Name:  Ken Krueger  Age:  42 y.o.  Sex:  male  :  1975  MRN:  [id number]  Visit Number:  [id number]  Primary Care Provider:  No Known Provider    Length of stay:  1    Chief complaint:  42 y.o. old male admitted with altered mental status, sepsis and infected decubitus ulcers      Subjective:    No acute issues overnight.  Patient is awake and alert today.  He denies any complaints.  He is significantly improved since yesterday.   Patient states that he has finished his antibiotics many weeks ago and has not had any antibiotics since last many weeks.  Patient states that he missed his appointment with Dr. shafer and has not been able to make another appointment.    Current Hospital Meds:    ARIPiprazole 10 mg Oral Nightly   aztreonam 2 g Intravenous Q8H   DULoxetine 60 mg Oral BID   famotidine 20 mg Intravenous Q12H   gabapentin 600 mg Oral Q8H   ipratropium-albuterol 3 mL Nebulization 4x Daily - RT   methenamine 1 g Oral BID   metroNIDAZOLE 500 mg Intravenous Q8H   pantoprazole 40 mg Oral QAM   sodium hypochlorite  Topical TID   sucralfate 1 g Oral 4x Daily AC & at Bedtime   topiramate 100 mg Oral TID   vancomycin 2,000 mg Intravenous Q12H   zinc oxide  Topical TID       sodium chloride 100 mL/hr Last Rate: 100 mL/hr (17 0821)     ----------------------------------------------------------------------------------------------------------------------  Vital Signs:  Temp:  [97.6 °F (36.4 °C)-102.5 °F (39.2 °C)] 98.2 °F (36.8 °C)  Heart Rate:  [] 79  Resp:  [16-28] 16  BP: (105-172)/(47-91) 136/78  Last 3 weights    17  1137 17  1744   Weight: 127 kg (280 lb) 113 kg (249 lb 11.2 oz)     Body mass index is 33.87 kg/(m^2).    Intake/Output Summary (Last 24 hours) at 17 1607  Last data filed at 17 1527   Gross per 24 hour   Intake             3115 ml   Output             3150 ml   Net              -35  ml     Diet Regular; Consistent Carbohydrate  ----------------------------------------------------------------------------------------------------------------------  Physical exam:  Constitutional:  Well-developed and well-nourished.     HENT:  Head:  Normocephalic and atraumatic.  Mouth:  Moist mucous membranes.    Eyes:  Conjunctivae and EOM are normal.  Pupils are equal, round, and reactive to light.   Neck:  Neck supple.  No JVD present.    Cardiovascular:  Regular rate and rhythm. S1+S2. No murmur, rubs or gallops.   Pulmonary/Chest: Clear to auscultation bilaterally.   Abdominal:  Soft. Non-tender. No viscera palpable.  Bowel sounds audible.   Musculoskeletal:  Left above-the-knee amputation.    Peripheral vascular: Bilateral dorsalis pedis palpable. No edema.   Neurological:  Alert and oriented to person, place, and time.  Cranial nerves grossly intact. Strength bilaterally symmetrical.  Skin: small stage III ulcer on cocyx.  One deep stage IV ulcer is in right gluteal region measuring 4x4x6 cm with purulent drainage and  A deep stage IV ulcer in the left ischial area measuring [id number] with purulent discharge.   ----------------------------------------------------------------------------------------------------------------------  Tele:  Sinus rhythm  ----------------------------------------------------------------------------------------------------------------------    Results from last 7 days  Lab Units 12/06/17  0910 12/06/17 0351 12/05/17 2201 12/05/17  1217   CK TOTAL U/L  --   --  123 290*   CKMB ng/mL  --   --  3.16 6.22*   CK MB INDEX %  --   --  2.6 2.1   TROPONIN I ng/mL 0.018 0.022 0.020 0.036       Results from last 7 days  Lab Units 12/06/17  0420 12/06/17  0351 12/05/17 2201 12/05/17  1850 12/05/17  1217   CRP mg/dL  --  24.61*  --  25.94*  --    LACTATE mmol/L  --   --   --   --  1.1   WBC 10*3/mm3 17.08*  --  14.54*  --  20.81*   HEMOGLOBIN g/dL 6.5*  --  7.4*  --  8.6*   HEMATOCRIT %  23.3*  --  26.1*  --  30.4*   MCV fL 79.5*  --  80.6  --  79.8*   MCHC g/dL 27.9*  --  28.4*  --  28.3*   PLATELETS 10*3/mm3 656*  --  643*  --  775*       Results from last 7 days  Lab Units 12/05/17  2149   PH, ARTERIAL pH units 7.363   PO2 ART mm Hg 98.9   PCO2, ARTERIAL mm Hg 28.8*   HCO3 ART mmol/L 16.0*       Results from last 7 days  Lab Units 12/06/17  0351 12/05/17  2201 12/05/17  1850 12/05/17  1217   SODIUM mmol/L 141 140  --  140   POTASSIUM mmol/L 3.3* 3.4*  --  3.9   MAGNESIUM mg/dL 1.6*  --  1.6*  --    CHLORIDE mmol/L 113* 112  --  106   CO2 mmol/L 20.2* 18.6*  --  29.0   BUN mg/dL 15 19  --  22*   CREATININE mg/dL 0.76 0.85  --  1.52*   EGFR IF NONAFRICN AM mL/min/1.73 114 100  --  51*   CALCIUM mg/dL 8.4 8.4  --  9.9   GLUCOSE mg/dL 100 89  --  95   ALBUMIN g/dL 3.00*  --   --  3.90   BILIRUBIN mg/dL 0.2  --   --  0.3   ALK PHOS U/L 93  --   --  123   AST (SGOT) U/L 24  --   --  33   ALT (SGPT) U/L 21  --   --  23   Estimated Creatinine Clearance: 167.8 mL/min (by C-G formula based on Cr of 0.76).    Ammonia   Date Value Ref Range Status   12/05/2017 39 16 - 60 umol/L Final         Blood Culture   Date Value Ref Range Status   12/05/2017 No growth at less than 24 hours  Preliminary   12/05/2017 No growth at 24 hours  Preliminary   12/05/2017 No growth at 24 hours  Preliminary     Urine Culture   Date Value Ref Range Status   12/05/2017 Culture in progress  Preliminary     Wound Culture   Date Value Ref Range Status   12/05/2017 Culture in progress  Preliminary          I have personally looked at the labs and they are summarized above.  ----------------------------------------------------------------------------------------------------------------------  Imaging Results (last 24 hours)     ** No results found for the last 24 hours. **        ----------------------------------------------------------------------------------------------------------------------  Assessment and Plan:    - Sepsis  Due  to infected decubitus ulcer and UTI.  Continue IV fluids and Vancomycin, aztreonam and Flagyl.  Wound culture and blood cultures ordered and pending.  Monitor inflammatory markers, culture results and CBC.     - Infected decubitus ulcers/osteomyelitis  Patient has been off of antibiotics for many weeks.  Continue broad-spectrum antibiotics as outlined above.  Continue wound care.  ID following and appreciate help from ID.  Patient evaluated by Dr. shafer in general surgical consultation and advised pelvic debridement and transferred to tertiary care center.  We'll discuss with Dr. shafer if patient needs to be transferred as an inpatient or needs to follow as an outpatient.     - Altered mental status due to metabolic encephalopathy  Secondary to sepsis and infected decubitus ulcers.   markedly improved.  CT head showed no acute intracranial abnormality.     - Acute urinary tract infection  Continue antibiotics as outlined above.  Follow up on urine cultures and blood culture and tailor antibiotic therapy accordingly.     - Metabolic acidosis  Secondary to sepsis and infection.  We'll continue IV fluids and monitor electrolytes     - Acute kidney injury  Due to above.  Continue IV fluids and monitor renal function.     - Hypomagnesemia  Replace magnesium per protocol.  Monitor electrolytes and replace as needed.     - Anemia  Microcytic and hypochromic.  Likely due to anemia of chronic disease given his recurrent infections.  We'll continue to monitor H&H and replace as needed.     - Prophylaxis  DVT: Heparin SQ  GI: Pepcid     - Fluids and nutrition  NS @ 100ml/hr.   Regular diet.      Pt is at high risk for decompensation due to sepsis, infected decubitus ulcer, acute urinary tract infection, metabolic acidosis, acute kidney injury.  Discussed plan of care with the patient understood and agreed with the plan.    Inocencia Montano MD  12/06/17  4:07 PM
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
## 2017-12-06 NOTE — PLAN OF CARE
Problem: Patient Care Overview (Adult)  Goal: Discharge Needs Assessment  Outcome: Ongoing (interventions implemented as appropriate)    Problem: Fall Risk (Adult)  Goal: Absence of Falls  Outcome: Ongoing (interventions implemented as appropriate)    Problem: Self-Care Deficit (Adult,Obstetrics,Pediatric)  Goal: Improved Ability to Perform BADL and IADL  Outcome: Ongoing (interventions implemented as appropriate)    Problem: Confusion, Acute (Adult)  Goal: Cognitive/Functional Impairments Minimized  Outcome: Ongoing (interventions implemented as appropriate)  Goal: Safety  Outcome: Ongoing (interventions implemented as appropriate)

## 2017-12-06 NOTE — NURSING NOTE
While assisting Michelle NELSON in patient care, the brother Pineda Krueger was at bedside and asked us to call ER and look for a blue lift sling that was brought in with the patient.  The room was searched with no results.  Contacted Gigi in ER who looked for sling.  Gigi called back saying sling was not found in ER.

## 2017-12-06 NOTE — NURSING NOTE
Received call from ARIANNA Martínez) giving verbal consent to transfuse blood products as needed.

## 2017-12-07 LAB
ABO + RH BLD: NORMAL
ANION GAP SERPL CALCULATED.3IONS-SCNC: 8.4 MMOL/L (ref 3.6–11.2)
BACTERIA SPEC AEROBE CULT: ABNORMAL
BACTERIA SPEC AEROBE CULT: ABNORMAL
BASOPHILS # BLD AUTO: 0.03 10*3/MM3 (ref 0–0.3)
BASOPHILS NFR BLD AUTO: 0.2 % (ref 0–2)
BH BB BLOOD EXPIRATION DATE: NORMAL
BH BB BLOOD TYPE BARCODE: 6200
BH BB DISPENSE STATUS: NORMAL
BH BB PRODUCT CODE: NORMAL
BH BB UNIT NUMBER: NORMAL
BUN BLD-MCNC: 13 MG/DL (ref 7–21)
BUN/CREAT SERPL: 19.1 (ref 7–25)
CALCIUM SPEC-SCNC: 8.3 MG/DL (ref 7.7–10)
CHLORIDE SERPL-SCNC: 111 MMOL/L (ref 99–112)
CO2 SERPL-SCNC: 20.6 MMOL/L (ref 24.3–31.9)
CREAT BLD-MCNC: 0.68 MG/DL (ref 0.43–1.29)
CROSSMATCH INTERPRETATION: NORMAL
CRP SERPL-MCNC: 22.3 MG/DL (ref 0–0.99)
DEPRECATED RDW RBC AUTO: 46 FL (ref 37–54)
EOSINOPHIL # BLD AUTO: 0.2 10*3/MM3 (ref 0–0.7)
EOSINOPHIL NFR BLD AUTO: 1.5 % (ref 0–5)
ERYTHROCYTE [DISTWIDTH] IN BLOOD BY AUTOMATED COUNT: 16.4 % (ref 11.5–14.5)
GFR SERPL CREATININE-BSD FRML MDRD: 129 ML/MIN/1.73
GLUCOSE BLD-MCNC: 100 MG/DL (ref 70–110)
HCT VFR BLD AUTO: 29.2 % (ref 42–52)
HGB BLD-MCNC: 8.7 G/DL (ref 14–18)
IMM GRANULOCYTES # BLD: 0.15 10*3/MM3 (ref 0–0.03)
IMM GRANULOCYTES NFR BLD: 1.1 % (ref 0–0.5)
LYMPHOCYTES # BLD AUTO: 2.44 10*3/MM3 (ref 1–3)
LYMPHOCYTES NFR BLD AUTO: 17.9 % (ref 21–51)
MAGNESIUM SERPL-MCNC: 2.2 MG/DL (ref 1.7–2.6)
MCH RBC QN AUTO: 24 PG (ref 27–33)
MCHC RBC AUTO-ENTMCNC: 29.8 G/DL (ref 33–37)
MCV RBC AUTO: 80.7 FL (ref 80–94)
MONOCYTES # BLD AUTO: 0.82 10*3/MM3 (ref 0.1–0.9)
MONOCYTES NFR BLD AUTO: 6 % (ref 0–10)
NEUTROPHILS # BLD AUTO: 9.96 10*3/MM3 (ref 1.4–6.5)
NEUTROPHILS NFR BLD AUTO: 73.3 % (ref 30–70)
OSMOLALITY SERPL CALC.SUM OF ELEC: 279.6 MOSM/KG (ref 273–305)
PHOSPHATE SERPL-MCNC: 3.3 MG/DL (ref 2.7–4.5)
PLATELET # BLD AUTO: 632 10*3/MM3 (ref 130–400)
PMV BLD AUTO: 9.4 FL (ref 6–10)
POTASSIUM BLD-SCNC: 4.3 MMOL/L (ref 3.5–5.3)
RBC # BLD AUTO: 3.62 10*6/MM3 (ref 4.7–6.1)
SODIUM BLD-SCNC: 140 MMOL/L (ref 135–153)
UNIT  ABO: NORMAL
UNIT  RH: NORMAL
WBC NRBC COR # BLD: 13.6 10*3/MM3 (ref 4.5–12.5)

## 2017-12-07 PROCEDURE — 80202 ASSAY OF VANCOMYCIN: CPT

## 2017-12-07 PROCEDURE — 85025 COMPLETE CBC W/AUTO DIFF WBC: CPT | Performed by: INTERNAL MEDICINE

## 2017-12-07 PROCEDURE — 25010000002 VANCOMYCIN PER 500 MG

## 2017-12-07 PROCEDURE — 99232 SBSQ HOSP IP/OBS MODERATE 35: CPT | Performed by: INTERNAL MEDICINE

## 2017-12-07 PROCEDURE — 94799 UNLISTED PULMONARY SVC/PX: CPT

## 2017-12-07 PROCEDURE — 83735 ASSAY OF MAGNESIUM: CPT | Performed by: PHYSICIAN ASSISTANT

## 2017-12-07 PROCEDURE — 86140 C-REACTIVE PROTEIN: CPT | Performed by: PHYSICIAN ASSISTANT

## 2017-12-07 PROCEDURE — 84100 ASSAY OF PHOSPHORUS: CPT | Performed by: PHYSICIAN ASSISTANT

## 2017-12-07 PROCEDURE — 80048 BASIC METABOLIC PNL TOTAL CA: CPT | Performed by: PHYSICIAN ASSISTANT

## 2017-12-07 RX ADMIN — ARIPIPRAZOLE 10 MG: 10 TABLET ORAL at 21:19

## 2017-12-07 RX ADMIN — GABAPENTIN 600 MG: 300 CAPSULE ORAL at 21:19

## 2017-12-07 RX ADMIN — ZINC OXIDE: 200 OINTMENT TOPICAL at 15:33

## 2017-12-07 RX ADMIN — METHENAMINE HIPPURATE 1 G: 1 TABLET ORAL at 18:02

## 2017-12-07 RX ADMIN — SODIUM HYPOCHLORITE: 1.25 SOLUTION TOPICAL at 08:55

## 2017-12-07 RX ADMIN — SUCRALFATE 1 G: 1 TABLET ORAL at 11:04

## 2017-12-07 RX ADMIN — METRONIDAZOLE 500 MG: 500 INJECTION, SOLUTION INTRAVENOUS at 21:19

## 2017-12-07 RX ADMIN — TOPIRAMATE 100 MG: 100 TABLET, FILM COATED ORAL at 15:33

## 2017-12-07 RX ADMIN — METHENAMINE HIPPURATE 1 G: 1 TABLET ORAL at 11:03

## 2017-12-07 RX ADMIN — VANCOMYCIN HYDROCHLORIDE 2000 MG: 5 INJECTION, POWDER, LYOPHILIZED, FOR SOLUTION INTRAVENOUS at 11:04

## 2017-12-07 RX ADMIN — ZINC OXIDE: 200 OINTMENT TOPICAL at 08:55

## 2017-12-07 RX ADMIN — PANTOPRAZOLE SODIUM 40 MG: 40 TABLET, DELAYED RELEASE ORAL at 06:12

## 2017-12-07 RX ADMIN — IPRATROPIUM BROMIDE AND ALBUTEROL SULFATE 3 ML: .5; 3 SOLUTION RESPIRATORY (INHALATION) at 10:30

## 2017-12-07 RX ADMIN — SODIUM HYPOCHLORITE: 1.25 SOLUTION TOPICAL at 15:33

## 2017-12-07 RX ADMIN — GABAPENTIN 600 MG: 300 CAPSULE ORAL at 05:10

## 2017-12-07 RX ADMIN — ALBUTEROL SULFATE 2.5 MG: 2.5 SOLUTION RESPIRATORY (INHALATION) at 19:20

## 2017-12-07 RX ADMIN — SUCRALFATE 1 G: 1 TABLET ORAL at 21:19

## 2017-12-07 RX ADMIN — VANCOMYCIN HYDROCHLORIDE 2000 MG: 5 INJECTION, POWDER, LYOPHILIZED, FOR SOLUTION INTRAVENOUS at 00:03

## 2017-12-07 RX ADMIN — TOPIRAMATE 100 MG: 100 TABLET, FILM COATED ORAL at 21:19

## 2017-12-07 RX ADMIN — DULOXETINE HYDROCHLORIDE 60 MG: 60 CAPSULE, DELAYED RELEASE ORAL at 18:01

## 2017-12-07 RX ADMIN — AZTREONAM 2 G: 2 INJECTION, POWDER, FOR SOLUTION INTRAMUSCULAR; INTRAVENOUS at 06:12

## 2017-12-07 RX ADMIN — AZTREONAM 2 G: 2 INJECTION, POWDER, FOR SOLUTION INTRAMUSCULAR; INTRAVENOUS at 15:33

## 2017-12-07 RX ADMIN — FAMOTIDINE 20 MG: 10 INJECTION INTRAVENOUS at 08:55

## 2017-12-07 RX ADMIN — SODIUM HYPOCHLORITE: 1.25 SOLUTION TOPICAL at 00:03

## 2017-12-07 RX ADMIN — TOPIRAMATE 100 MG: 100 TABLET, FILM COATED ORAL at 08:55

## 2017-12-07 RX ADMIN — IPRATROPIUM BROMIDE AND ALBUTEROL SULFATE 3 ML: .5; 3 SOLUTION RESPIRATORY (INHALATION) at 01:01

## 2017-12-07 RX ADMIN — METRONIDAZOLE 500 MG: 500 INJECTION, SOLUTION INTRAVENOUS at 14:10

## 2017-12-07 RX ADMIN — SODIUM CHLORIDE 100 ML/HR: 9 INJECTION, SOLUTION INTRAVENOUS at 05:11

## 2017-12-07 RX ADMIN — DULOXETINE HYDROCHLORIDE 60 MG: 60 CAPSULE, DELAYED RELEASE ORAL at 08:55

## 2017-12-07 RX ADMIN — GABAPENTIN 600 MG: 300 CAPSULE ORAL at 14:17

## 2017-12-07 RX ADMIN — IPRATROPIUM BROMIDE AND ALBUTEROL SULFATE 3 ML: .5; 3 SOLUTION RESPIRATORY (INHALATION) at 12:29

## 2017-12-07 RX ADMIN — SUCRALFATE 1 G: 1 TABLET ORAL at 08:55

## 2017-12-07 RX ADMIN — METRONIDAZOLE 500 MG: 500 INJECTION, SOLUTION INTRAVENOUS at 05:10

## 2017-12-07 RX ADMIN — SUCRALFATE 1 G: 1 TABLET ORAL at 18:01

## 2017-12-07 NOTE — PLAN OF CARE
Problem: Patient Care Overview (Adult)  Goal: Plan of Care Review    12/05/17 1947 12/06/17 2000   Coping/Psychosocial Response Interventions   Plan Of Care Reviewed With --  patient   Patient Care Overview   Progress no change --

## 2017-12-07 NOTE — NURSING NOTE
Assisted with urostomy flange and bag change .  Stoma is a pink bud  Peristomal skin is free from dermatitis

## 2017-12-07 NOTE — PLAN OF CARE
Problem: Patient Care Overview (Adult)  Goal: Plan of Care Review  Outcome: Ongoing (interventions implemented as appropriate)  Goal: Adult Individualization and Mutuality  Outcome: Ongoing (interventions implemented as appropriate)  Goal: Discharge Needs Assessment  Outcome: Ongoing (interventions implemented as appropriate)    Problem: Fall Risk (Adult)  Goal: Identify Related Risk Factors and Signs and Symptoms  Outcome: Ongoing (interventions implemented as appropriate)  Goal: Absence of Falls  Outcome: Ongoing (interventions implemented as appropriate)    Problem: Skin Integrity Impairment, Risk/Actual (Adult)  Goal: Identify Related Risk Factors and Signs and Symptoms  Outcome: Ongoing (interventions implemented as appropriate)  Goal: Skin Integrity/Wound Healing  Outcome: Ongoing (interventions implemented as appropriate)    Problem: Pressure Ulcer Risk (Alejandro Scale) (Adult,Obstetrics,Pediatric)  Goal: Identify Related Risk Factors and Signs and Symptoms  Outcome: Ongoing (interventions implemented as appropriate)  Goal: Skin Integrity  Outcome: Ongoing (interventions implemented as appropriate)    Problem: Infection, Risk/Actual (Adult)  Goal: Identify Related Risk Factors and Signs and Symptoms  Outcome: Ongoing (interventions implemented as appropriate)  Goal: Infection Prevention/Resolution  Outcome: Ongoing (interventions implemented as appropriate)    Problem: Pressure Ulcer (Adult)  Goal: Signs and Symptoms of Listed Potential Problems Will be Absent or Manageable (Pressure Ulcer)  Outcome: Ongoing (interventions implemented as appropriate)    Problem: Self-Care Deficit (Adult,Obstetrics,Pediatric)  Goal: Identify Related Risk Factors and Signs and Symptoms  Outcome: Ongoing (interventions implemented as appropriate)  Goal: Improved Ability to Perform BADL and IADL  Outcome: Ongoing (interventions implemented as appropriate)    Problem: Confusion, Acute (Adult)  Goal: Identify Related Risk Factors and  Signs and Symptoms  Outcome: Ongoing (interventions implemented as appropriate)  Goal: Cognitive/Functional Impairments Minimized  Outcome: Ongoing (interventions implemented as appropriate)  Goal: Safety  Outcome: Ongoing (interventions implemented as appropriate)

## 2017-12-07 NOTE — PROGRESS NOTES
[facility] HOSPITALIST PROGRESS NOTE     Patient Identification:  Name:  Ken Krueger  Age:  42 y.o.  Sex:  male  :  1975  MRN:  [id number]  Visit Number:  [id number]  Primary Care Provider:  No Known Provider    Length of stay:  2    Chief complaint:  42 y.o. old male admitted with altered mental status, sepsis and infected decubitus ulcers      Subjective:    No acute issues overnight.  Patient is awake and alert today.  He denies any complaints.  Patient states that he is worried about what is going to happen but denies any complaints at this time.      Current Hospital Meds:    ARIPiprazole 10 mg Oral Nightly   aztreonam 2 g Intravenous Q8H   DULoxetine 60 mg Oral BID   gabapentin 600 mg Oral Q8H   ipratropium-albuterol 3 mL Nebulization 4x Daily - RT   methenamine 1 g Oral BID   metroNIDAZOLE 500 mg Intravenous Q8H   pantoprazole 40 mg Oral QAM   sodium hypochlorite  Topical TID   sucralfate 1 g Oral 4x Daily AC & at Bedtime   topiramate 100 mg Oral TID   vancomycin 2,000 mg Intravenous Q12H   zinc oxide  Topical TID       sodium chloride 100 mL/hr Last Rate: 100 mL/hr (17 0511)     ----------------------------------------------------------------------------------------------------------------------  Vital Signs:  Temp:  [98 °F (36.7 °C)-99.9 °F (37.7 °C)] 98.3 °F (36.8 °C)  Heart Rate:  [] 80  Resp:  [16-20] 20  BP: (122-152)/(77-90) 130/79  Last 3 weights    17  1137 17  1744   Weight: 127 kg (280 lb) 113 kg (249 lb 11.2 oz)     Body mass index is 33.87 kg/(m^2).    Intake/Output Summary (Last 24 hours) at 17 1126  Last data filed at 17 1100   Gross per 24 hour   Intake             2580 ml   Output             4400 ml   Net            -1820 ml     Diet Regular; Consistent Carbohydrate  ----------------------------------------------------------------------------------------------------------------------  Physical exam:  Constitutional:  Well-developed  and well-nourished.     HENT:  Head:  Normocephalic and atraumatic.  Mouth:  Moist mucous membranes.    Eyes:  Conjunctivae and EOM are normal.  Pupils are equal, round, and reactive to light.   Neck:  Neck supple.  No JVD present.    Cardiovascular:  Regular rate and rhythm. S1+S2. No murmur, rubs or gallops.   Pulmonary/Chest: Clear to auscultation bilaterally.   Abdominal:  Soft. Non-tender. No viscera palpable.  Bowel sounds audible.   Musculoskeletal:  Left above-the-knee amputation.    Peripheral vascular: Bilateral dorsalis pedis palpable. No edema.   Neurological:  Alert and oriented to person, place, and time.  Cranial nerves grossly intact. Strength bilaterally symmetrical.  Skin: small stage III ulcer on cocyx.  One deep stage IV ulcer is in right gluteal region measuring 4x4x6 cm with purulent drainage and  A deep stage IV ulcer in the left ischial area measuring [id number] with purulent discharge.   ----------------------------------------------------------------------------------------------------------------------  Tele:  Sinus rhythm  ----------------------------------------------------------------------------------------------------------------------    Results from last 7 days  Lab Units 12/06/17  0910 12/06/17  0351 12/05/17  2201 12/05/17  1217   CK TOTAL U/L  --   --  123 290*   CKMB ng/mL  --   --  3.16 6.22*   CK MB INDEX %  --   --  2.6 2.1   TROPONIN I ng/mL 0.018 0.022 0.020 0.036       Results from last 7 days  Lab Units 12/07/17  0122 12/06/17  2043 12/06/17  0420 12/06/17  0351 12/05/17  2201 12/05/17  1850 12/05/17  1217   CRP mg/dL 22.30*  --   --  24.61*  --  25.94*  --    LACTATE mmol/L  --   --   --   --   --   --  1.1   WBC 10*3/mm3 13.60*  --  17.08*  --  14.54*  --  20.81*   HEMOGLOBIN g/dL 8.7* 9.4* 6.5*  --  7.4*  --  8.6*   HEMATOCRIT % 29.2* 30.8* 23.3*  --  26.1*  --  30.4*   MCV fL 80.7  --  79.5*  --  80.6  --  79.8*   MCHC g/dL 29.8*  --  27.9*  --  28.4*  --  28.3*    PLATELETS 10*3/mm3 632*  --  656*  --  643*  --  775*       Results from last 7 days  Lab Units 12/05/17  2149   PH, ARTERIAL pH units 7.363   PO2 ART mm Hg 98.9   PCO2, ARTERIAL mm Hg 28.8*   HCO3 ART mmol/L 16.0*       Results from last 7 days  Lab Units 12/07/17  0122 12/06/17  1537 12/06/17  0351 12/05/17  2201 12/05/17  1850 12/05/17  1217   SODIUM mmol/L 140  --  141 140  --  140   POTASSIUM mmol/L 4.3 4.2 3.3* 3.4*  --  3.9   MAGNESIUM mg/dL 2.2  --  1.6*  --  1.6*  --    CHLORIDE mmol/L 111  --  113* 112  --  106   CO2 mmol/L 20.6*  --  20.2* 18.6*  --  29.0   BUN mg/dL 13  --  15 19  --  22*   CREATININE mg/dL 0.68  --  0.76 0.85  --  1.52*   EGFR IF NONAFRICN AM mL/min/1.73 129  --  114 100  --  51*   CALCIUM mg/dL 8.3  --  8.4 8.4  --  9.9   GLUCOSE mg/dL 100  --  100 89  --  95   ALBUMIN g/dL  --   --  3.00*  --   --  3.90   BILIRUBIN mg/dL  --   --  0.2  --   --  0.3   ALK PHOS U/L  --   --  93  --   --  123   AST (SGOT) U/L  --   --  24  --   --  33   ALT (SGPT) U/L  --   --  21  --   --  23   Estimated Creatinine Clearance: 187.5 mL/min (by C-G formula based on Cr of 0.68).    Ammonia   Date Value Ref Range Status   12/05/2017 39 16 - 60 umol/L Final         Blood Culture   Date Value Ref Range Status   12/05/2017 No growth at less than 24 hours  Preliminary   12/05/2017 No growth at 24 hours  Preliminary   12/05/2017 No growth at 24 hours  Preliminary     Urine Culture   Date Value Ref Range Status   12/05/2017 Culture in progress  Preliminary     Wound Culture   Date Value Ref Range Status   12/05/2017 Culture in progress  Preliminary          I have personally looked at the labs and they are summarized above.  ----------------------------------------------------------------------------------------------------------------------  Imaging Results (last 24 hours)     ** No results found for the last 24 hours. **         ----------------------------------------------------------------------------------------------------------------------  Assessment and Plan:    - Sepsis  Improving.  CRP and CBC is improving.  Patient is afebrile and VS stable.  Due to infected decubitus ulcer and UTI.  Continue IV fluids and Vancomycin, aztreonam and Flagyl.   no growth on blood cultures so far.  Wound culture growing gram-positive cocci in pairs and clusters and gram-negative bacilli     - Infected decubitus ulcers/osteomyelitis  Patient has been off of antibiotics for many weeks prior to presentation to the emergency department.  Continue broad-spectrum antibiotics as outlined above.  Continue wound care.  ID following and appreciate help from ID.  Patient evaluated by Dr. Blair in general surgical consultation and advised pelvic debridement and transferred to tertiary care center.  We'll continue IV antibiotics and if patient continues to improve with setting up as an outpatient with orthopedic surgeon who is able to do pelvic debridement.     - Altered mental status due to metabolic encephalopathy  Resolved..    Secondary to sepsis and infected decubitus ulcers.  CT head showed no acute intracranial abnormality.     - Acute urinary tract infection  Continue antibiotics as outlined above.  Follow up on urine cultures and blood culture and tailor antibiotic therapy accordingly.     - Metabolic acidosis  Improving.   Secondary to sepsis and infection.  Continue IV fluids and monitor electrolytes     - Acute kidney injury  Resolved. Continue IV fluids and monitor renal function.     - Anemia  Microcytic and hypochromic.  Likely due to anemia of chronic disease given his recurrent infections.  We'll continue to monitor H&H and replace as needed.     - Prophylaxis  DVT: Heparin SQ  GI: Pepcid     - Fluids and nutrition  NS @ 100ml/hr.   Regular diet.      Pt is at high risk for decompensation due to sepsis, infected decubitus ulcer, acute urinary  tract infection, metabolic acidosis, acute kidney injury.  Discussed plan of care with the patient understood and agreed with the plan.    Inocencia Montano MD  12/07/17  11:26 AM

## 2017-12-07 NOTE — PROGRESS NOTES
"  I have personally seen and examined the patient today and discussed overnight interval progress and pertinent issues with nursing staff.    Subjective:    Patient is awake and alert upon exam this morning.  He denies any shortness of breath, pain or discomfort.  CRP continues to improve with mild leukocytosis.  No fever reported overnight.    History taken from: patient chart RN      Vital Signs    /79 (BP Location: Left arm, Patient Position: Lying)  Pulse 76  Temp 98.3 °F (36.8 °C) (Oral)   Resp 20  Ht 182.9 cm (72\")  Wt 113 kg (249 lb 11.2 oz)  SpO2 98%  BMI 33.87 kg/m2    Temp:  [98 °F (36.7 °C)-99.9 °F (37.7 °C)] 98.3 °F (36.8 °C)      Intake/Output Summary (Last 24 hours) at 12/07/17 0947  Last data filed at 12/07/17 0300   Gross per 24 hour   Intake             2100 ml   Output             4200 ml   Net            -2100 ml     Intake & Output (last 3 days)       12/04 0701 - 12/05 0700 12/05 0701 - 12/06 0700 12/06 0701 - 12/07 0700 12/07 0701 - 12/08 0700    P.O.   900     I.V. (mL/kg)  1975 (17.4)      Blood   1200     Total Intake(mL/kg)  1975 (17.4) 2100 (18.5)     Urine (mL/kg/hr)  950 3450 (1.3)     Stool   750 (0.3)     Total Output   950 4200      Net   +1025 -2100                    Physical exam:    General Appearance:    Alert, cooperative, in no acute distress   Head:    Normocephalic, without obvious abnormality, atraumatic   Eyes:            Lids and lashes normal, conjunctivae and sclerae normal, no   icterus, no pallor, corneas clear, PERRLA   Ears:    Ears appear intact with no abnormalities noted   Throat:   No oral lesions, no thrush, oral mucosa moist   Neck:   No adenopathy, supple, trachea midline, no thyromegaly, no   carotid bruit, no JVD   Back:     No tenderness to percussion or palpation, range of motion   normal   Lungs:     Clear to auscultation,respirations regular, even and unlabored. No wheezing, no ronchi and no crackles.    Heart:    Regular rhythm and normal " rate, normal S1 and S2, no            murmur, no gallop, no rub, no click   Chest Wall:    No abnormalities observed   Abdomen:     Normal bowel sounds, no masses, no organomegaly, soft        non-tender, non-distended, no guarding, no rebound                tenderness   Rectal:     Deferred   Extremities:   Moves all extremities well, no edema, no cyanosis, no             redness   Pulses:   Pulses palpable and equal bilaterally   Skin:   No bleeding, bruising or rash   Lymph nodes:   No palpable adenopathy   Neurologic:   Awake, alert and oriented x 3. Following commands.           Results:      Results from last 7 days  Lab Units 12/07/17  0122 12/06/17  0420 12/05/17  2201 12/05/17  1217   WBC 10*3/mm3 13.60* 17.08* 14.54* 20.81*     Lab Results   Component Value Date    NEUTROABS 9.96 (H) 12/07/2017         Results from last 7 days  Lab Units 12/07/17  0122   CREATININE mg/dL 0.68         Results from last 7 days  Lab Units 12/07/17  0122 12/06/17  0351 12/05/17  1850   CRP mg/dL 22.30* 24.61* 25.94*       Imaging Results (last 24 hours)     ** No results found for the last 24 hours. **            Results Review:    I have personally reviewed laboratory data, culture results, radiology studies and antimicrobial therapy.    Hospital Medications (active)       Dose Frequency Start End    acetaminophen (TYLENOL) tablet 650 mg 650 mg Every 6 Hours PRN 12/5/2017     Sig - Route: Take 2 tablets by mouth Every 6 (Six) Hours As Needed for Mild Pain  or Fever. - Oral    albuterol (PROVENTIL) nebulizer solution 0.083% 2.5 mg/3mL 2.5 mg Every 6 Hours PRN 12/6/2017     Sig - Route: Take 2.5 mg by nebulization Every 6 (Six) Hours As Needed for Wheezing or Shortness of Air. - Nebulization    ARIPiprazole (ABILIFY) tablet 10 mg 10 mg Nightly 12/6/2017     Sig - Route: Take 1 tablet by mouth Every Night. - Oral    aztreonam (AZACTAM) 2 g/100 mL 0.9% NS (mbp) 2 g Every 8 Hours 12/6/2017 12/16/2017    Sig - Route: Infuse 100 mL  "into a venous catheter Every 8 (Eight) Hours. - Intravenous    DULoxetine (CYMBALTA) DR capsule 60 mg 60 mg 2 Times Daily 12/6/2017     Sig - Route: Take 1 capsule by mouth 2 (Two) Times a Day. - Oral    famotidine (PEPCID) injection 20 mg 20 mg Every 12 Hours Scheduled 12/5/2017     Sig - Route: Infuse 2 mL into a venous catheter Every 12 (Twelve) Hours. - Intravenous    gabapentin (NEURONTIN) capsule 600 mg 600 mg Every 8 Hours Scheduled 12/6/2017     Sig - Route: Take 2 capsules by mouth Every 8 (Eight) Hours. - Oral    HYDROmorphone (DILAUDID) 1 MG/ML injection  - ADS Override Pull   12/6/2017 12/7/2017    Notes to Pharmacy: Created by cabinet override    ipratropium-albuterol (DUO-NEB) nebulizer solution 3 mL 3 mL 4 Times Daily - RT 12/5/2017     Sig - Route: Take 3 mL by nebulization 4 (Four) Times a Day. - Nebulization    Magnesium Sulfate 2 gram Bolus, followed by 8 gram infusion (total Mg dose 10 grams)- Mg less than or equal to 1mg/dL 2 g As Needed 12/6/2017     Sig - Route: Infuse 50 mL into a venous catheter As Needed (Mg less than or equal to 1mg/dL). - Intravenous    Cosign for Ordering: Accepted by Inocencia Montano MD on 12/6/2017  8:10 AM    Linked Group 1:  \"Or\" Linked Group Details        magnesium sulfate 4 gram infusion- Mg 1.6-1.9 mg/dL 4 g As Needed 12/6/2017     Sig - Route: Infuse 100 mL into a venous catheter As Needed (Mg 1.6-1.9 mg/dL). - Intravenous    Cosign for Ordering: Accepted by Inocencia Montano MD on 12/6/2017  8:10 AM    Linked Group 1:  \"Or\" Linked Group Details        magnesium sulfate 4 gram infusion- Mg 1.6-1.9 mg/dL 4 g Once 12/6/2017 12/6/2017    Sig - Route: Infuse 100 mL into a venous catheter 1 (One) Time. - Intravenous    Magnesium Sulfate 6 gram Infusion (2 gm x 3) -Mg 1.1 -1.5 mg/dL 2 g As Needed 12/6/2017     Sig - Route: Infuse 50 mL into a venous catheter As Needed (Mg 1.1 -1.5 mg/dL). - Intravenous    Cosign for Ordering: Accepted by Inocencia Montano MD on " "12/6/2017  8:10 AM    Linked Group 1:  \"Or\" Linked Group Details        methenamine (HIPREX) tablet 1 g 1 g 2 Times Daily 12/6/2017     Sig - Route: Take 1 tablet by mouth 2 (Two) Times a Day. - Oral    metroNIDAZOLE (FLAGYL) IVPB 500 mg 500 mg Every 8 Hours 12/5/2017 12/15/2017    Sig - Route: Infuse 100 mL into a venous catheter Every 8 (Eight) Hours. - Intravenous    nitroglycerin (NITROSTAT) SL tablet 0.4 mg 0.4 mg Every 5 Minutes PRN 12/5/2017     Sig - Route: Place 1 tablet under the tongue Every 5 (Five) Minutes As Needed for Chest Pain (if systolic BP greater than 100 mm/Hg.). - Sublingual    Cosign for Ordering: Accepted by Inocencia Montano MD on 12/5/2017  6:13 PM    nystatin (MYCOSTATIN) powder 1 application 1 application 3 Times Daily PRN 12/6/2017     Sig - Route: Apply 1 application topically 3 (Three) Times a Day As Needed (skin). - Topical    pantoprazole (PROTONIX) EC tablet 40 mg 40 mg Every Morning 12/6/2017     Sig - Route: Take 1 tablet by mouth Every Morning. - Oral    potassium chloride (K-DUR,KLOR-CON) CR tablet 40 mEq 40 mEq Every 4 Hours 12/6/2017 12/6/2017    Sig - Route: Take 2 tablets by mouth Every 4 (Four) Hours. - Oral    potassium chloride (KLOR-CON) packet 40 mEq 40 mEq As Needed 12/6/2017     Sig - Route: Take 40 mEq by mouth As Needed (potassium replacement, see admin instructions). - Oral    Cosign for Ordering: Accepted by Inocencia Montano MD on 12/6/2017  8:10 AM    Linked Group 2:  \"Or\" Linked Group Details        potassium chloride (MICRO-K) CR capsule 40 mEq 40 mEq As Needed 12/6/2017     Sig - Route: Take 4 capsules by mouth As Needed (potassium replacement.  see admin instructions). - Oral    Cosign for Ordering: Accepted by Inocencia Montano MD on 12/6/2017  8:10 AM    Linked Group 2:  \"Or\" Linked Group Details        potassium chloride 10 mEq in 100 mL IVPB 10 mEq Every 1 Hour PRN 12/6/2017     Sig - Route: Infuse 100 mL into a venous catheter Every 1 (One) Hour " "As Needed (potassium protocol PERIPHERAL - see admin instructions). - Intravenous    Cosign for Ordering: Accepted by Inocencia Montano MD on 12/6/2017  8:10 AM    Linked Group 2:  \"Or\" Linked Group Details        sodium chloride 0.9 % flush 1-10 mL 1-10 mL As Needed 12/5/2017     Sig - Route: Infuse 1-10 mL into a venous catheter As Needed for Line Care. - Intravenous    Cosign for Ordering: Accepted by Inocencia Montano MD on 12/5/2017  6:13 PM    sodium chloride 0.9 % infusion 100 mL/hr Continuous 12/5/2017     Sig - Route: Infuse 100 mL/hr into a venous catheter Continuous. - Intravenous    sodium hypochlorite (DAKIN'S 1/4 STRENGTH) 0.125 % topical solution 0.125% solution  3 Times Daily 12/6/2017     Sig - Route: Apply  topically 3 (Three) Times a Day. - Topical    Cosign for Ordering: Accepted by Sung Blair MD on 12/6/2017  4:01 PM    sucralfate (CARAFATE) tablet 1 g 1 g 4 Times Daily Before Meals & Nightly 12/6/2017     Sig - Route: Take 1 tablet by mouth 4 (Four) Times a Day Before Meals & at Bedtime. - Oral    topiramate (TOPAMAX) tablet 100 mg 100 mg 3 Times Daily 12/6/2017     Sig - Route: Take 1 tablet by mouth 3 (Three) Times a Day. - Oral    vancomycin (VANCOCIN) 2,000 mg in sodium chloride 0.9 % 500 mL IVPB 2,000 mg Every 12 Hours 12/6/2017 12/20/2017    Sig - Route: Infuse 2,000 mg into a venous catheter Every 12 (Twelve) Hours. - Intravenous    zinc oxide 20 % ointment  3 Times Daily 12/6/2017     Sig - Route: Apply  topically 3 (Three) Times a Day. - Topical    Pharmacy to dose vancomycin (Discontinued)  Continuous PRN 12/5/2017 12/6/2017    Sig - Route: Continuous As Needed for Consult. - Does not apply    Reason for Discontinue: Duplicate order    sodium hypochlorite (DAKIN'S 1/4 STRENGTH) 0.125 % topical solution 0.125% solution (Discontinued)  2 Times Daily 12/6/2017 12/6/2017    Sig - Route: Apply  topically 2 (Two) Times a Day. - Topical    Cosign for Ordering: Accepted by Sung" Juan Carlos Blair MD on 12/6/2017  4:01 PM    vancomycin (VANCOCIN) 2,000 mg in sodium chloride 0.9 % 500 mL IVPB (Discontinued) 2,000 mg Every 24 Hours 12/6/2017 12/6/2017    Sig - Route: Infuse 2,000 mg into a venous catheter Daily. - Intravenous            Cultures:    Blood Culture   Date Value Ref Range Status   12/05/2017 No growth at 24 hours  Preliminary   12/05/2017 No growth at 24 hours  Preliminary   12/05/2017 No growth at 24 hours  Preliminary     Urine Culture   Date Value Ref Range Status   12/05/2017 Culture in progress  Preliminary     Wound Culture   Date Value Ref Range Status   12/05/2017 (A)  Preliminary    Light growth (2+) Gram Negative Bacilli, Morphology consistent with Escherichia / Citrobacter           Assessment/Plan     ASSESSMENT:    1.  Severe sepsis with encephalopathy  2.  Aspiration pneumonia  3.  Decubitus ulcers    PLAN:    Patient is awake and alert upon exam this morning.  He denies any shortness of breath, pain or discomfort.  CRP continues to improve with mild leukocytosis.  No fever reported overnight.  When culture collected on 12/5/17 luminary results showed light growth of gram-negative bacilli consistent with Escherichia/Citrobacter.     For now recommend to continue Azactam, Flagyl and vancomycin until finalization of cultures and sensitivities are identified.  We will then adjust antibiotic therapy accordingly if indicated.    We will continue to follow closely.    Patient's findings and recommendations were discussed with patient, nursing staff, primary care team and consulting provider    Code Status: Full Code    GUANACO Lawrence  12/07/17  9:47 AM

## 2017-12-08 LAB
ANION GAP SERPL CALCULATED.3IONS-SCNC: 7.4 MMOL/L (ref 3.6–11.2)
BASOPHILS # BLD AUTO: 0.04 10*3/MM3 (ref 0–0.3)
BASOPHILS NFR BLD AUTO: 0.3 % (ref 0–2)
BUN BLD-MCNC: 13 MG/DL (ref 7–21)
BUN/CREAT SERPL: 21 (ref 7–25)
CALCIUM SPEC-SCNC: 8.5 MG/DL (ref 7.7–10)
CHLORIDE SERPL-SCNC: 109 MMOL/L (ref 99–112)
CO2 SERPL-SCNC: 21.6 MMOL/L (ref 24.3–31.9)
CREAT BLD-MCNC: 0.62 MG/DL (ref 0.43–1.29)
CRP SERPL-MCNC: 16.53 MG/DL (ref 0–0.99)
DEPRECATED RDW RBC AUTO: 46.8 FL (ref 37–54)
EOSINOPHIL # BLD AUTO: 0.26 10*3/MM3 (ref 0–0.7)
EOSINOPHIL NFR BLD AUTO: 2.3 % (ref 0–5)
ERYTHROCYTE [DISTWIDTH] IN BLOOD BY AUTOMATED COUNT: 16.8 % (ref 11.5–14.5)
GFR SERPL CREATININE-BSD FRML MDRD: 144 ML/MIN/1.73
GLUCOSE BLD-MCNC: 84 MG/DL (ref 70–110)
HCT VFR BLD AUTO: 29.4 % (ref 42–52)
HGB BLD-MCNC: 8.9 G/DL (ref 14–18)
IMM GRANULOCYTES # BLD: 0.11 10*3/MM3 (ref 0–0.03)
IMM GRANULOCYTES NFR BLD: 1 % (ref 0–0.5)
LYMPHOCYTES # BLD AUTO: 2.14 10*3/MM3 (ref 1–3)
LYMPHOCYTES NFR BLD AUTO: 18.6 % (ref 21–51)
MCH RBC QN AUTO: 24.3 PG (ref 27–33)
MCHC RBC AUTO-ENTMCNC: 30.3 G/DL (ref 33–37)
MCV RBC AUTO: 80.1 FL (ref 80–94)
MONOCYTES # BLD AUTO: 0.55 10*3/MM3 (ref 0.1–0.9)
MONOCYTES NFR BLD AUTO: 4.8 % (ref 0–10)
NEUTROPHILS # BLD AUTO: 8.42 10*3/MM3 (ref 1.4–6.5)
NEUTROPHILS NFR BLD AUTO: 73 % (ref 30–70)
OSMOLALITY SERPL CALC.SUM OF ELEC: 275 MOSM/KG (ref 273–305)
PLATELET # BLD AUTO: 601 10*3/MM3 (ref 130–400)
PMV BLD AUTO: 9.3 FL (ref 6–10)
POTASSIUM BLD-SCNC: 4 MMOL/L (ref 3.5–5.3)
RBC # BLD AUTO: 3.67 10*6/MM3 (ref 4.7–6.1)
SODIUM BLD-SCNC: 138 MMOL/L (ref 135–153)
VANCOMYCIN TROUGH SERPL-MCNC: 18.5 MCG/ML (ref 5–15)
WBC NRBC COR # BLD: 11.52 10*3/MM3 (ref 4.5–12.5)

## 2017-12-08 PROCEDURE — 94799 UNLISTED PULMONARY SVC/PX: CPT

## 2017-12-08 PROCEDURE — 80048 BASIC METABOLIC PNL TOTAL CA: CPT | Performed by: PHYSICIAN ASSISTANT

## 2017-12-08 PROCEDURE — 99232 SBSQ HOSP IP/OBS MODERATE 35: CPT | Performed by: INTERNAL MEDICINE

## 2017-12-08 PROCEDURE — 25010000002 VANCOMYCIN PER 500 MG

## 2017-12-08 PROCEDURE — 85025 COMPLETE CBC W/AUTO DIFF WBC: CPT | Performed by: INTERNAL MEDICINE

## 2017-12-08 PROCEDURE — 86140 C-REACTIVE PROTEIN: CPT | Performed by: PHYSICIAN ASSISTANT

## 2017-12-08 RX ORDER — IPRATROPIUM BROMIDE AND ALBUTEROL SULFATE 2.5; .5 MG/3ML; MG/3ML
3 SOLUTION RESPIRATORY (INHALATION) EVERY 6 HOURS PRN
Status: DISCONTINUED | OUTPATIENT
Start: 2017-12-08 | End: 2017-12-15

## 2017-12-08 RX ADMIN — TOPIRAMATE 100 MG: 100 TABLET, FILM COATED ORAL at 16:35

## 2017-12-08 RX ADMIN — GABAPENTIN 600 MG: 300 CAPSULE ORAL at 13:24

## 2017-12-08 RX ADMIN — AZTREONAM 2 G: 2 INJECTION, POWDER, FOR SOLUTION INTRAMUSCULAR; INTRAVENOUS at 06:23

## 2017-12-08 RX ADMIN — TOPIRAMATE 100 MG: 100 TABLET, FILM COATED ORAL at 21:55

## 2017-12-08 RX ADMIN — IPRATROPIUM BROMIDE AND ALBUTEROL SULFATE 3 ML: .5; 3 SOLUTION RESPIRATORY (INHALATION) at 07:06

## 2017-12-08 RX ADMIN — SODIUM HYPOCHLORITE: 1.25 SOLUTION TOPICAL at 01:22

## 2017-12-08 RX ADMIN — SUCRALFATE 1 G: 1 TABLET ORAL at 16:37

## 2017-12-08 RX ADMIN — METHENAMINE HIPPURATE 1 G: 1 TABLET ORAL at 17:26

## 2017-12-08 RX ADMIN — SODIUM CHLORIDE 100 ML/HR: 9 INJECTION, SOLUTION INTRAVENOUS at 01:22

## 2017-12-08 RX ADMIN — ARIPIPRAZOLE 10 MG: 10 TABLET ORAL at 21:55

## 2017-12-08 RX ADMIN — TOPIRAMATE 100 MG: 100 TABLET, FILM COATED ORAL at 10:44

## 2017-12-08 RX ADMIN — VANCOMYCIN HYDROCHLORIDE 2000 MG: 5 INJECTION, POWDER, LYOPHILIZED, FOR SOLUTION INTRAVENOUS at 01:23

## 2017-12-08 RX ADMIN — SUCRALFATE 1 G: 1 TABLET ORAL at 21:55

## 2017-12-08 RX ADMIN — AZTREONAM 2 G: 2 INJECTION, POWDER, FOR SOLUTION INTRAMUSCULAR; INTRAVENOUS at 00:00

## 2017-12-08 RX ADMIN — METRONIDAZOLE 500 MG: 500 INJECTION, SOLUTION INTRAVENOUS at 13:24

## 2017-12-08 RX ADMIN — IPRATROPIUM BROMIDE AND ALBUTEROL SULFATE 3 ML: .5; 3 SOLUTION RESPIRATORY (INHALATION) at 18:54

## 2017-12-08 RX ADMIN — ZINC OXIDE: 200 OINTMENT TOPICAL at 16:35

## 2017-12-08 RX ADMIN — GABAPENTIN 600 MG: 300 CAPSULE ORAL at 05:12

## 2017-12-08 RX ADMIN — ZINC OXIDE: 200 OINTMENT TOPICAL at 10:43

## 2017-12-08 RX ADMIN — METRONIDAZOLE 500 MG: 500 INJECTION, SOLUTION INTRAVENOUS at 05:12

## 2017-12-08 RX ADMIN — AZTREONAM 2 G: 2 INJECTION, POWDER, FOR SOLUTION INTRAMUSCULAR; INTRAVENOUS at 16:35

## 2017-12-08 RX ADMIN — SODIUM HYPOCHLORITE: 1.25 SOLUTION TOPICAL at 10:43

## 2017-12-08 RX ADMIN — SUCRALFATE 1 G: 1 TABLET ORAL at 10:44

## 2017-12-08 RX ADMIN — VANCOMYCIN HYDROCHLORIDE 2000 MG: 5 INJECTION, POWDER, LYOPHILIZED, FOR SOLUTION INTRAVENOUS at 11:43

## 2017-12-08 RX ADMIN — METRONIDAZOLE 500 MG: 500 INJECTION, SOLUTION INTRAVENOUS at 22:00

## 2017-12-08 RX ADMIN — DULOXETINE HYDROCHLORIDE 60 MG: 60 CAPSULE, DELAYED RELEASE ORAL at 17:26

## 2017-12-08 RX ADMIN — DULOXETINE HYDROCHLORIDE 60 MG: 60 CAPSULE, DELAYED RELEASE ORAL at 10:44

## 2017-12-08 RX ADMIN — PANTOPRAZOLE SODIUM 40 MG: 40 TABLET, DELAYED RELEASE ORAL at 05:12

## 2017-12-08 RX ADMIN — ZINC OXIDE: 200 OINTMENT TOPICAL at 01:23

## 2017-12-08 RX ADMIN — SODIUM HYPOCHLORITE: 1.25 SOLUTION TOPICAL at 16:35

## 2017-12-08 RX ADMIN — IPRATROPIUM BROMIDE AND ALBUTEROL SULFATE 3 ML: .5; 3 SOLUTION RESPIRATORY (INHALATION) at 01:01

## 2017-12-08 RX ADMIN — IPRATROPIUM BROMIDE AND ALBUTEROL SULFATE 3 ML: .5; 3 SOLUTION RESPIRATORY (INHALATION) at 12:42

## 2017-12-08 RX ADMIN — GABAPENTIN 600 MG: 300 CAPSULE ORAL at 21:55

## 2017-12-08 RX ADMIN — METHENAMINE HIPPURATE 1 G: 1 TABLET ORAL at 10:44

## 2017-12-08 NOTE — PROGRESS NOTES
"  I have personally seen and examined the patient today and discussed overnight interval progress and pertinent issues with nursing staff.    Subjective:    Bilateral ulcers, very deep, with necrosis, greenish bloody drainage with odor.  Colostomy with stool noted.  Improving CRP level and normal white count.  No reported fever.    History taken from: patient chart RN      Vital Signs    /77 (BP Location: Left arm, Patient Position: Lying)  Pulse 76  Temp 98.5 °F (36.9 °C) (Oral)   Resp 20  Ht 182.9 cm (72\")  Wt 113 kg (249 lb 11.2 oz)  SpO2 98%  BMI 33.87 kg/m2    Temp:  [98.3 °F (36.8 °C)-98.5 °F (36.9 °C)] 98.5 °F (36.9 °C)      Intake/Output Summary (Last 24 hours) at 12/08/17 0940  Last data filed at 12/08/17 0852   Gross per 24 hour   Intake             3560 ml   Output             2950 ml   Net              610 ml     Intake & Output (last 3 days)       12/05 0701 - 12/06 0700 12/06 0701 - 12/07 0700 12/07 0701 - 12/08 0700 12/08 0701 - 12/09 0700    P.O.  900 1560 2480    I.V. (mL/kg) 1975 (17.4)       Blood  1200      Total Intake(mL/kg) 1975 (17.4) 2100 (18.5) 1560 (13.8) 2480 (21.9)    Urine (mL/kg/hr) 950 3450 (1.3) 2600 (1) 450 (1.5)    Stool  750 (0.3) 650 (0.2) 150 (0.5)    Total Output 950 4200 3250 600    Net +1025 -2100 -1690 +1880            Unmeasured Urine Occurrence   1050 x     Unmeasured Stool Occurrence   1 x           Physical exam:    General Appearance:    Alert, cooperative, in no acute distress   Head:    Normocephalic, without obvious abnormality, atraumatic   Eyes:            Lids and lashes normal, conjunctivae and sclerae normal, no   icterus, no pallor, corneas clear, PERRLA   Ears:    Ears appear intact with no abnormalities noted   Throat:   No oral lesions, no thrush, oral mucosa moist   Neck:   No adenopathy, supple, trachea midline, no thyromegaly, no   carotid bruit, no JVD   Back:     No tenderness to percussion or palpation, range of motion   normal   Lungs:    "  Clear to auscultation,respirations regular, even and unlabored. No wheezing, no ronchi and no crackles.    Heart:    Regular rhythm and normal rate, normal S1 and S2, no            murmur, no gallop, no rub, no click   Chest Wall:    No abnormalities observed   Abdomen:     Normal bowel sounds, no masses, no organomegaly, soft        non-tender, non-distended, no guarding, no rebound                tenderness   Rectal:     Deferred   Extremities:   Moves all extremities well, no edema, no cyanosis, no             redness   Pulses:   Pulses palpable and equal bilaterally   Skin:   No bleeding, bruising or rash   Lymph nodes:   No palpable adenopathy   Neurologic:   Awake, alert and oriented x 3. Following commands.           Results:      Results from last 7 days  Lab Units 12/08/17  0004 12/07/17  0122 12/06/17  0420 12/05/17  2201 12/05/17  1217   WBC 10*3/mm3 11.52 13.60* 17.08* 14.54* 20.81*     Lab Results   Component Value Date    NEUTROABS 8.42 (H) 12/08/2017         Results from last 7 days  Lab Units 12/08/17  0004   CREATININE mg/dL 0.62         Results from last 7 days  Lab Units 12/08/17  0004 12/07/17  0122 12/06/17  0351   CRP mg/dL 16.53* 22.30* 24.61*       Imaging Results (last 24 hours)     ** No results found for the last 24 hours. **            Results Review:    I have personally reviewed laboratory data, culture results, radiology studies and antimicrobial therapy.    Hospital Medications (active)       Dose Frequency Start End    acetaminophen (TYLENOL) tablet 650 mg 650 mg Every 6 Hours PRN 12/5/2017     Sig - Route: Take 2 tablets by mouth Every 6 (Six) Hours As Needed for Mild Pain  or Fever. - Oral    albuterol (PROVENTIL) nebulizer solution 0.083% 2.5 mg/3mL 2.5 mg Every 6 Hours PRN 12/6/2017     Sig - Route: Take 2.5 mg by nebulization Every 6 (Six) Hours As Needed for Wheezing or Shortness of Air. - Nebulization    ARIPiprazole (ABILIFY) tablet 10 mg 10 mg Nightly 12/6/2017     Sig -  "Route: Take 1 tablet by mouth Every Night. - Oral    aztreonam (AZACTAM) 2 g/100 mL 0.9% NS (mbp) 2 g Every 8 Hours 12/6/2017 12/16/2017    Sig - Route: Infuse 100 mL into a venous catheter Every 8 (Eight) Hours. - Intravenous    DULoxetine (CYMBALTA) DR capsule 60 mg 60 mg 2 Times Daily 12/6/2017     Sig - Route: Take 1 capsule by mouth 2 (Two) Times a Day. - Oral    famotidine (PEPCID) injection 20 mg 20 mg Every 12 Hours Scheduled 12/5/2017     Sig - Route: Infuse 2 mL into a venous catheter Every 12 (Twelve) Hours. - Intravenous    gabapentin (NEURONTIN) capsule 600 mg 600 mg Every 8 Hours Scheduled 12/6/2017     Sig - Route: Take 2 capsules by mouth Every 8 (Eight) Hours. - Oral    HYDROmorphone (DILAUDID) 1 MG/ML injection  - ADS Override Pull   12/6/2017 12/7/2017    Notes to Pharmacy: Created by cabinet override    ipratropium-albuterol (DUO-NEB) nebulizer solution 3 mL 3 mL 4 Times Daily - RT 12/5/2017     Sig - Route: Take 3 mL by nebulization 4 (Four) Times a Day. - Nebulization    Magnesium Sulfate 2 gram Bolus, followed by 8 gram infusion (total Mg dose 10 grams)- Mg less than or equal to 1mg/dL 2 g As Needed 12/6/2017     Sig - Route: Infuse 50 mL into a venous catheter As Needed (Mg less than or equal to 1mg/dL). - Intravenous    Cosign for Ordering: Accepted by Inocencia Montano MD on 12/6/2017  8:10 AM    Linked Group 1:  \"Or\" Linked Group Details        magnesium sulfate 4 gram infusion- Mg 1.6-1.9 mg/dL 4 g As Needed 12/6/2017     Sig - Route: Infuse 100 mL into a venous catheter As Needed (Mg 1.6-1.9 mg/dL). - Intravenous    Cosign for Ordering: Accepted by Inocencia Montano MD on 12/6/2017  8:10 AM    Linked Group 1:  \"Or\" Linked Group Details        magnesium sulfate 4 gram infusion- Mg 1.6-1.9 mg/dL 4 g Once 12/6/2017 12/6/2017    Sig - Route: Infuse 100 mL into a venous catheter 1 (One) Time. - Intravenous    Magnesium Sulfate 6 gram Infusion (2 gm x 3) -Mg 1.1 -1.5 mg/dL 2 g As Needed " "12/6/2017     Sig - Route: Infuse 50 mL into a venous catheter As Needed (Mg 1.1 -1.5 mg/dL). - Intravenous    Cosign for Ordering: Accepted by Inocencia Montano MD on 12/6/2017  8:10 AM    Linked Group 1:  \"Or\" Linked Group Details        methenamine (HIPREX) tablet 1 g 1 g 2 Times Daily 12/6/2017     Sig - Route: Take 1 tablet by mouth 2 (Two) Times a Day. - Oral    metroNIDAZOLE (FLAGYL) IVPB 500 mg 500 mg Every 8 Hours 12/5/2017 12/15/2017    Sig - Route: Infuse 100 mL into a venous catheter Every 8 (Eight) Hours. - Intravenous    nitroglycerin (NITROSTAT) SL tablet 0.4 mg 0.4 mg Every 5 Minutes PRN 12/5/2017     Sig - Route: Place 1 tablet under the tongue Every 5 (Five) Minutes As Needed for Chest Pain (if systolic BP greater than 100 mm/Hg.). - Sublingual    Cosign for Ordering: Accepted by Inocencia Montano MD on 12/5/2017  6:13 PM    nystatin (MYCOSTATIN) powder 1 application 1 application 3 Times Daily PRN 12/6/2017     Sig - Route: Apply 1 application topically 3 (Three) Times a Day As Needed (skin). - Topical    pantoprazole (PROTONIX) EC tablet 40 mg 40 mg Every Morning 12/6/2017     Sig - Route: Take 1 tablet by mouth Every Morning. - Oral    potassium chloride (K-DUR,KLOR-CON) CR tablet 40 mEq 40 mEq Every 4 Hours 12/6/2017 12/6/2017    Sig - Route: Take 2 tablets by mouth Every 4 (Four) Hours. - Oral    potassium chloride (KLOR-CON) packet 40 mEq 40 mEq As Needed 12/6/2017     Sig - Route: Take 40 mEq by mouth As Needed (potassium replacement, see admin instructions). - Oral    Cosign for Ordering: Accepted by Inocencia Montano MD on 12/6/2017  8:10 AM    Linked Group 2:  \"Or\" Linked Group Details        potassium chloride (MICRO-K) CR capsule 40 mEq 40 mEq As Needed 12/6/2017     Sig - Route: Take 4 capsules by mouth As Needed (potassium replacement.  see admin instructions). - Oral    Cosign for Ordering: Accepted by Inocencia Montano MD on 12/6/2017  8:10 AM    Linked Group 2:  \"Or\" Linked " "Group Details        potassium chloride 10 mEq in 100 mL IVPB 10 mEq Every 1 Hour PRN 12/6/2017     Sig - Route: Infuse 100 mL into a venous catheter Every 1 (One) Hour As Needed (potassium protocol PERIPHERAL - see admin instructions). - Intravenous    Cosign for Ordering: Accepted by Inocencia Montano MD on 12/6/2017  8:10 AM    Linked Group 2:  \"Or\" Linked Group Details        sodium chloride 0.9 % flush 1-10 mL 1-10 mL As Needed 12/5/2017     Sig - Route: Infuse 1-10 mL into a venous catheter As Needed for Line Care. - Intravenous    Cosign for Ordering: Accepted by Inocencia Montano MD on 12/5/2017  6:13 PM    sodium chloride 0.9 % infusion 100 mL/hr Continuous 12/5/2017     Sig - Route: Infuse 100 mL/hr into a venous catheter Continuous. - Intravenous    sodium hypochlorite (DAKIN'S 1/4 STRENGTH) 0.125 % topical solution 0.125% solution  3 Times Daily 12/6/2017     Sig - Route: Apply  topically 3 (Three) Times a Day. - Topical    Cosign for Ordering: Accepted by Sung Blair MD on 12/6/2017  4:01 PM    sucralfate (CARAFATE) tablet 1 g 1 g 4 Times Daily Before Meals & Nightly 12/6/2017     Sig - Route: Take 1 tablet by mouth 4 (Four) Times a Day Before Meals & at Bedtime. - Oral    topiramate (TOPAMAX) tablet 100 mg 100 mg 3 Times Daily 12/6/2017     Sig - Route: Take 1 tablet by mouth 3 (Three) Times a Day. - Oral    vancomycin (VANCOCIN) 2,000 mg in sodium chloride 0.9 % 500 mL IVPB 2,000 mg Every 12 Hours 12/6/2017 12/20/2017    Sig - Route: Infuse 2,000 mg into a venous catheter Every 12 (Twelve) Hours. - Intravenous    zinc oxide 20 % ointment  3 Times Daily 12/6/2017     Sig - Route: Apply  topically 3 (Three) Times a Day. - Topical    Pharmacy to dose vancomycin (Discontinued)  Continuous PRN 12/5/2017 12/6/2017    Sig - Route: Continuous As Needed for Consult. - Does not apply    Reason for Discontinue: Duplicate order    sodium hypochlorite (DAKIN'S 1/4 STRENGTH) 0.125 % topical solution " 0.125% solution (Discontinued)  2 Times Daily 12/6/2017 12/6/2017    Sig - Route: Apply  topically 2 (Two) Times a Day. - Topical    Cosign for Ordering: Accepted by Sung Blair MD on 12/6/2017  4:01 PM    vancomycin (VANCOCIN) 2,000 mg in sodium chloride 0.9 % 500 mL IVPB (Discontinued) 2,000 mg Every 24 Hours 12/6/2017 12/6/2017    Sig - Route: Infuse 2,000 mg into a venous catheter Daily. - Intravenous            Cultures:    Blood Culture   Date Value Ref Range Status   12/05/2017 No growth at 24 hours  Preliminary   12/05/2017 No growth at 24 hours  Preliminary   12/05/2017 No growth at 24 hours  Preliminary     Urine Culture   Date Value Ref Range Status   12/05/2017 Culture in progress  Preliminary     Wound Culture   Date Value Ref Range Status   12/05/2017 (A)  Preliminary    Light growth (2+) Gram Negative Bacilli, Morphology consistent with Escherichia / Citrobacter           Assessment/Plan     ASSESSMENT:    1.  Severe sepsis with encephalopathy  2.  Aspiration pneumonia  3.  Decubitus ulcers    PLAN:  Bilateral ulcers, very deep, with necrosis, greenish bloody drainage with odor.  Colostomy with stool noted.  Improving CRP level and normal white count.  No reported fever.    Surgical consult recommends transfer to tertiary care center due to complexity of care.  From ID standpoint patient seems stable with normal white count and significantly improving CRP level on broad-spectrum coverage with vancomycin, Azactam and Flagyl.    We will continue to follow closely.    Patient's findings and recommendations were discussed with patient, nursing staff, primary care team and consulting provider    Code Status: Full Code     Scribed for Dr. Tra Wade, GUANACO  12/08/17  9:40 AM

## 2017-12-08 NOTE — PLAN OF CARE
Problem: Patient Care Overview (Adult)  Goal: Plan of Care Review  Outcome: Ongoing (interventions implemented as appropriate)    12/08/17 0900 12/08/17 1512   Coping/Psychosocial Response Interventions   Plan Of Care Reviewed With patient --    Patient Care Overview   Progress --  improving       Goal: Adult Individualization and Mutuality  Outcome: Ongoing (interventions implemented as appropriate)  Goal: Discharge Needs Assessment  Outcome: Ongoing (interventions implemented as appropriate)    Problem: Fall Risk (Adult)  Goal: Identify Related Risk Factors and Signs and Symptoms  Outcome: Ongoing (interventions implemented as appropriate)  Goal: Absence of Falls  Outcome: Ongoing (interventions implemented as appropriate)    Problem: Skin Integrity Impairment, Risk/Actual (Adult)  Goal: Identify Related Risk Factors and Signs and Symptoms  Outcome: Ongoing (interventions implemented as appropriate)  Goal: Skin Integrity/Wound Healing  Outcome: Ongoing (interventions implemented as appropriate)    Problem: Pressure Ulcer Risk (Alejandro Scale) (Adult,Obstetrics,Pediatric)  Goal: Identify Related Risk Factors and Signs and Symptoms  Outcome: Ongoing (interventions implemented as appropriate)  Goal: Skin Integrity  Outcome: Ongoing (interventions implemented as appropriate)    Problem: Infection, Risk/Actual (Adult)  Goal: Identify Related Risk Factors and Signs and Symptoms  Outcome: Ongoing (interventions implemented as appropriate)  Goal: Infection Prevention/Resolution  Outcome: Ongoing (interventions implemented as appropriate)    Problem: Pressure Ulcer (Adult)  Goal: Signs and Symptoms of Listed Potential Problems Will be Absent or Manageable (Pressure Ulcer)  Outcome: Ongoing (interventions implemented as appropriate)    Problem: Self-Care Deficit (Adult,Obstetrics,Pediatric)  Goal: Identify Related Risk Factors and Signs and Symptoms  Outcome: Ongoing (interventions implemented as appropriate)  Goal: Improved  Ability to Perform BADL and IADL  Outcome: Ongoing (interventions implemented as appropriate)    Problem: Confusion, Acute (Adult)  Goal: Identify Related Risk Factors and Signs and Symptoms  Outcome: Ongoing (interventions implemented as appropriate)  Goal: Cognitive/Functional Impairments Minimized  Outcome: Ongoing (interventions implemented as appropriate)  Goal: Safety  Outcome: Ongoing (interventions implemented as appropriate)    Problem: Colostomy (Adult)  Goal: Signs and Symptoms of Listed Potential Problems Will be Absent or Manageable (Colostomy)  Outcome: Ongoing (interventions implemented as appropriate)

## 2017-12-08 NOTE — PROGRESS NOTES
Vancomycin trough level obtained last night was reported at 18.5 mg/L.  This is within the desired therapeutic trough range.  Recommend continuing current dose of 2g iv q12hrs.  Pharmacy will continue to follow.  Thank you.

## 2017-12-08 NOTE — NURSING NOTE
Called patient's brother Pineda Krueger to verify whether he had received Flu vaccine this season, he stated that he had not. He also stated to ask patient if he wished to receive Flu vaccine or not while inpatient at our facility.

## 2017-12-08 NOTE — PLAN OF CARE
Problem: Patient Care Overview (Adult)  Goal: Plan of Care Review  Outcome: Ongoing (interventions implemented as appropriate)    12/05/17 1947 12/07/17 2000   Coping/Psychosocial Response Interventions   Plan Of Care Reviewed With --  patient   Patient Care Overview   Progress no change --

## 2017-12-08 NOTE — PROGRESS NOTES
Discharge Planning Assessment   Fabricio     Patient Name: Ken Krueger  MRN: [id number]  Today's Date: 12/8/2017    Admit Date: 12/5/2017       Discharge Plan       12/08/17 1302    Case Management/Social Work Plan    Plan SS spoke to pt and brother on this date. Pt's brother plans for pt to return home at discharge. Pt utilizes [facility]. [facility] [phone] to be contacted at discharge. Pt has a hospital bed with trapeze bar. SS to follow.              Expected Discharge Date and Time     Expected Discharge Date Expected Discharge Time    Dec 10, 2017           Alycia Reed

## 2017-12-08 NOTE — PROGRESS NOTES
[facility] HOSPITALIST PROGRESS NOTE     Patient Identification:  Name:  Ken Krueger  Age:  42 y.o.  Sex:  male  :  1975  MRN:  [id number]  Visit Number:  [id number]  Primary Care Provider:  No Known Provider    Length of stay:  3    Chief complaint:  42 y.o. old male admitted with altered mental status, sepsis and infected decubitus ulcers      Subjective:    No acute issues overnight.  Patient's brother is present at bedside.  Patient denies any new complaints..      Current Hospital Meds:    ARIPiprazole 10 mg Oral Nightly   aztreonam 2 g Intravenous Q8H   DULoxetine 60 mg Oral BID   gabapentin 600 mg Oral Q8H   ipratropium-albuterol 3 mL Nebulization 4x Daily - RT   methenamine 1 g Oral BID   metroNIDAZOLE 500 mg Intravenous Q8H   pantoprazole 40 mg Oral QAM   sodium hypochlorite  Topical TID   sucralfate 1 g Oral 4x Daily AC & at Bedtime   topiramate 100 mg Oral TID   vancomycin 2,000 mg Intravenous Q12H   zinc oxide  Topical TID       sodium chloride 100 mL/hr Last Rate: 100 mL/hr (17 0122)     ----------------------------------------------------------------------------------------------------------------------  Vital Signs:  Temp:  [98.3 °F (36.8 °C)-98.6 °F (37 °C)] 98.6 °F (37 °C)  Heart Rate:  [64-76] 64  Resp:  [18-20] 20  BP: (134-175)/(74-96) 175/91  Last 3 weights    17  1137 17  1744   Weight: 127 kg (280 lb) 113 kg (249 lb 11.2 oz)     Body mass index is 33.87 kg/(m^2).    Intake/Output Summary (Last 24 hours) at 17 1412  Last data filed at 17 1301   Gross per 24 hour   Intake             1700 ml   Output             2000 ml   Net             -300 ml     Diet Regular; Consistent Carbohydrate  ----------------------------------------------------------------------------------------------------------------------  Physical exam:  Constitutional:  Well-developed and well-nourished.     HENT:  Head:  Normocephalic and atraumatic.  Mouth:  Moist mucous  membranes.    Eyes:  Conjunctivae and EOM are normal.  Pupils are equal, round, and reactive to light.   Neck:  Neck supple.  No JVD present.    Cardiovascular:  Regular rate and rhythm. S1+S2. No murmur, rubs or gallops.   Pulmonary/Chest: Clear to auscultation bilaterally.   Abdominal:  Soft. Non-tender. No viscera palpable.  Bowel sounds audible.   Musculoskeletal:  Left above-the-knee amputation.    Peripheral vascular: Bilateral dorsalis pedis palpable. No edema.   Neurological:  Alert and oriented to person, place, and time.  Cranial nerves grossly intact. Strength bilaterally symmetrical.  Skin: small stage III ulcer on cocyx.  One deep stage IV ulcer is in right gluteal region measuring 4x4x6 cm with purulent drainage and  A deep stage IV ulcer in the left ischial area measuring [id number] with purulent discharge.   ----------------------------------------------------------------------------------------------------------------------  Tele:  Sinus rhythm  ----------------------------------------------------------------------------------------------------------------------    Results from last 7 days  Lab Units 12/06/17  0910 12/06/17  0351 12/05/17  2201 12/05/17  1217   CK TOTAL U/L  --   --  123 290*   CKMB ng/mL  --   --  3.16 6.22*   CK MB INDEX %  --   --  2.6 2.1   TROPONIN I ng/mL 0.018 0.022 0.020 0.036       Results from last 7 days  Lab Units 12/08/17  0004 12/07/17  0122 12/06/17  2043 12/06/17  0420 12/06/17  0351  12/05/17  1217   CRP mg/dL 16.53* 22.30*  --   --  24.61*  < >  --    LACTATE mmol/L  --   --   --   --   --   --  1.1   WBC 10*3/mm3 11.52 13.60*  --  17.08*  --   < > 20.81*   HEMOGLOBIN g/dL 8.9* 8.7* 9.4* 6.5*  --   < > 8.6*   HEMATOCRIT % 29.4* 29.2* 30.8* 23.3*  --   < > 30.4*   MCV fL 80.1 80.7  --  79.5*  --   < > 79.8*   MCHC g/dL 30.3* 29.8*  --  27.9*  --   < > 28.3*   PLATELETS 10*3/mm3 601* 632*  --  656*  --   < > 775*   < > = values in this interval not  displayed.    Results from last 7 days  Lab Units 12/05/17  2149   PH, ARTERIAL pH units 7.363   PO2 ART mm Hg 98.9   PCO2, ARTERIAL mm Hg 28.8*   HCO3 ART mmol/L 16.0*       Results from last 7 days  Lab Units 12/08/17  0004 12/07/17  0122 12/06/17  1537 12/06/17  0351  12/05/17  1850 12/05/17  1217   SODIUM mmol/L 138 140  --  141  < >  --  140   POTASSIUM mmol/L 4.0 4.3 4.2 3.3*  < >  --  3.9   MAGNESIUM mg/dL  --  2.2  --  1.6*  --  1.6*  --    CHLORIDE mmol/L 109 111  --  113*  < >  --  106   CO2 mmol/L 21.6* 20.6*  --  20.2*  < >  --  29.0   BUN mg/dL 13 13  --  15  < >  --  22*   CREATININE mg/dL 0.62 0.68  --  0.76  < >  --  1.52*   EGFR IF NONAFRICN AM mL/min/1.73 144 129  --  114  < >  --  51*   CALCIUM mg/dL 8.5 8.3  --  8.4  < >  --  9.9   GLUCOSE mg/dL 84 100  --  100  < >  --  95   ALBUMIN g/dL  --   --   --  3.00*  --   --  3.90   BILIRUBIN mg/dL  --   --   --  0.2  --   --  0.3   ALK PHOS U/L  --   --   --  93  --   --  123   AST (SGOT) U/L  --   --   --  24  --   --  33   ALT (SGPT) U/L  --   --   --  21  --   --  23   < > = values in this interval not displayed.Estimated Creatinine Clearance: 205.6 mL/min (by C-G formula based on Cr of 0.62).    No results found for: AMMONIA      Blood Culture   Date Value Ref Range Status   12/05/2017 No growth at less than 24 hours  Preliminary   12/05/2017 No growth at 24 hours  Preliminary   12/05/2017 No growth at 24 hours  Preliminary     Urine Culture   Date Value Ref Range Status   12/05/2017 Culture in progress  Preliminary     Wound Culture   Date Value Ref Range Status   12/05/2017 Culture in progress  Preliminary          I have personally looked at the labs and they are summarized above.  ----------------------------------------------------------------------------------------------------------------------  Imaging Results (last 24 hours)     ** No results found for the last 24 hours. **         ----------------------------------------------------------------------------------------------------------------------  Assessment and Plan:    - Sepsis  Improving.  CRP Is improving and WBCs normalized.  Patient is afebrile and VS stable.  Due to infected decubitus ulcer and UTI.  Continue IV fluids and Vancomycin, aztreonam and Flagyl.   no growth on blood cultures so far.  Wound culture growing Escherichia coli and group B BHS.      - Infected decubitus ulcers/osteomyelitis  Patient has been off of antibiotics for many weeks prior to presentation to the emergency department.  Continue broad-spectrum antibiotics as outlined above.  Continue wound care.  ID following and appreciate help from ID.  Patient evaluated by Dr. Blair in general surgical consultation and advised pelvic debridement and transferred to tertiary care center.  We'll continue IV antibiotics and if patient continues to improve with setting up as an outpatient with orthopedic surgeon who is able to do pelvic debridement.     - Altered mental status due to metabolic encephalopathy  Resolved..    Secondary to sepsis and infected decubitus ulcers.  CT head showed no acute intracranial abnormality.     - Acute urinary tract infection due to Klebsiella  Continue antibiotics as outlined above.       - Metabolic acidosis  Improving.   Secondary to sepsis and infection.  Continue IV fluids and monitor electrolytes     - Acute kidney injury  Resolved. Continue IV fluids and monitor renal function.     - Anemia  Microcytic and hypochromic.  Likely due to anemia of chronic disease given his recurrent infections.  We'll continue to monitor H&H and replace as needed.     - Prophylaxis  DVT: Heparin SQ  GI: Pepcid     - Fluids and nutrition  NS @ 100ml/hr.   Regular diet.      Pt is at high risk for decompensation due to sepsis, infected decubitus ulcer, acute urinary tract infection, metabolic acidosis, acute kidney injury.  Discussed plan of care with the  patient understood and agreed with the plan.    Inocencia Montano MD  12/08/17  2:12 PM

## 2017-12-09 LAB
ANION GAP SERPL CALCULATED.3IONS-SCNC: 9.2 MMOL/L (ref 3.6–11.2)
BACTERIA SPEC AEROBE CULT: ABNORMAL
BASOPHILS # BLD AUTO: 0.04 10*3/MM3 (ref 0–0.3)
BASOPHILS NFR BLD AUTO: 0.3 % (ref 0–2)
BUN BLD-MCNC: 14 MG/DL (ref 7–21)
BUN/CREAT SERPL: 28 (ref 7–25)
CALCIUM SPEC-SCNC: 8.5 MG/DL (ref 7.7–10)
CHLORIDE SERPL-SCNC: 108 MMOL/L (ref 99–112)
CO2 SERPL-SCNC: 21.8 MMOL/L (ref 24.3–31.9)
CREAT BLD-MCNC: 0.5 MG/DL (ref 0.43–1.29)
CRP SERPL-MCNC: 13.14 MG/DL (ref 0–0.99)
DEPRECATED RDW RBC AUTO: 48.1 FL (ref 37–54)
EOSINOPHIL # BLD AUTO: 0.36 10*3/MM3 (ref 0–0.7)
EOSINOPHIL NFR BLD AUTO: 3.1 % (ref 0–5)
ERYTHROCYTE [DISTWIDTH] IN BLOOD BY AUTOMATED COUNT: 17.5 % (ref 11.5–14.5)
GFR SERPL CREATININE-BSD FRML MDRD: >150 ML/MIN/1.73
GLUCOSE BLD-MCNC: 88 MG/DL (ref 70–110)
GLUCOSE BLDC GLUCOMTR-MCNC: 95 MG/DL (ref 70–130)
GRAM STN SPEC: ABNORMAL
HCT VFR BLD AUTO: 31.7 % (ref 42–52)
HGB BLD-MCNC: 9.6 G/DL (ref 14–18)
IMM GRANULOCYTES # BLD: 0.15 10*3/MM3 (ref 0–0.03)
IMM GRANULOCYTES NFR BLD: 1.3 % (ref 0–0.5)
LYMPHOCYTES # BLD AUTO: 2.46 10*3/MM3 (ref 1–3)
LYMPHOCYTES NFR BLD AUTO: 21.2 % (ref 21–51)
MCH RBC QN AUTO: 24.2 PG (ref 27–33)
MCHC RBC AUTO-ENTMCNC: 30.3 G/DL (ref 33–37)
MCV RBC AUTO: 80.1 FL (ref 80–94)
MONOCYTES # BLD AUTO: 0.59 10*3/MM3 (ref 0.1–0.9)
MONOCYTES NFR BLD AUTO: 5.1 % (ref 0–10)
NEUTROPHILS # BLD AUTO: 8.02 10*3/MM3 (ref 1.4–6.5)
NEUTROPHILS NFR BLD AUTO: 69 % (ref 30–70)
OSMOLALITY SERPL CALC.SUM OF ELEC: 277.4 MOSM/KG (ref 273–305)
PLATELET # BLD AUTO: 620 10*3/MM3 (ref 130–400)
PMV BLD AUTO: 9.2 FL (ref 6–10)
POTASSIUM BLD-SCNC: 4.2 MMOL/L (ref 3.5–5.3)
RBC # BLD AUTO: 3.96 10*6/MM3 (ref 4.7–6.1)
SODIUM BLD-SCNC: 139 MMOL/L (ref 135–153)
STREP GROUPING: ABNORMAL
WBC NRBC COR # BLD: 11.62 10*3/MM3 (ref 4.5–12.5)

## 2017-12-09 PROCEDURE — 85025 COMPLETE CBC W/AUTO DIFF WBC: CPT | Performed by: INTERNAL MEDICINE

## 2017-12-09 PROCEDURE — 82962 GLUCOSE BLOOD TEST: CPT

## 2017-12-09 PROCEDURE — 25010000002 VANCOMYCIN PER 500 MG

## 2017-12-09 PROCEDURE — 86140 C-REACTIVE PROTEIN: CPT | Performed by: PHYSICIAN ASSISTANT

## 2017-12-09 PROCEDURE — 99232 SBSQ HOSP IP/OBS MODERATE 35: CPT | Performed by: INTERNAL MEDICINE

## 2017-12-09 PROCEDURE — 80048 BASIC METABOLIC PNL TOTAL CA: CPT | Performed by: PHYSICIAN ASSISTANT

## 2017-12-09 PROCEDURE — 94799 UNLISTED PULMONARY SVC/PX: CPT

## 2017-12-09 RX ORDER — DIPHENHYDRAMINE, LIDOCAINE, NYSTATIN
5 KIT ORAL 4 TIMES DAILY
Status: DISCONTINUED | OUTPATIENT
Start: 2017-12-09 | End: 2017-12-18 | Stop reason: HOSPADM

## 2017-12-09 RX ADMIN — AZTREONAM 2 G: 2 INJECTION, POWDER, FOR SOLUTION INTRAMUSCULAR; INTRAVENOUS at 01:00

## 2017-12-09 RX ADMIN — AZTREONAM 2 G: 2 INJECTION, POWDER, FOR SOLUTION INTRAMUSCULAR; INTRAVENOUS at 22:19

## 2017-12-09 RX ADMIN — SODIUM CHLORIDE 100 ML/HR: 9 INJECTION, SOLUTION INTRAVENOUS at 17:26

## 2017-12-09 RX ADMIN — ZINC OXIDE: 200 OINTMENT TOPICAL at 21:03

## 2017-12-09 RX ADMIN — TOPIRAMATE 100 MG: 100 TABLET, FILM COATED ORAL at 21:02

## 2017-12-09 RX ADMIN — ZINC OXIDE: 200 OINTMENT TOPICAL at 15:32

## 2017-12-09 RX ADMIN — IPRATROPIUM BROMIDE AND ALBUTEROL SULFATE 3 ML: .5; 3 SOLUTION RESPIRATORY (INHALATION) at 06:33

## 2017-12-09 RX ADMIN — SODIUM HYPOCHLORITE: 1.25 SOLUTION TOPICAL at 01:00

## 2017-12-09 RX ADMIN — GABAPENTIN 600 MG: 300 CAPSULE ORAL at 13:34

## 2017-12-09 RX ADMIN — SUCRALFATE 1 G: 1 TABLET ORAL at 17:21

## 2017-12-09 RX ADMIN — METHENAMINE HIPPURATE 1 G: 1 TABLET ORAL at 09:12

## 2017-12-09 RX ADMIN — ZINC OXIDE: 200 OINTMENT TOPICAL at 01:00

## 2017-12-09 RX ADMIN — VANCOMYCIN HYDROCHLORIDE 2000 MG: 5 INJECTION, POWDER, LYOPHILIZED, FOR SOLUTION INTRAVENOUS at 11:46

## 2017-12-09 RX ADMIN — SODIUM HYPOCHLORITE: 1.25 SOLUTION TOPICAL at 15:32

## 2017-12-09 RX ADMIN — SODIUM HYPOCHLORITE: 1.25 SOLUTION TOPICAL at 09:14

## 2017-12-09 RX ADMIN — METRONIDAZOLE 500 MG: 500 INJECTION, SOLUTION INTRAVENOUS at 13:34

## 2017-12-09 RX ADMIN — METRONIDAZOLE 500 MG: 500 INJECTION, SOLUTION INTRAVENOUS at 21:03

## 2017-12-09 RX ADMIN — SUCRALFATE 1 G: 1 TABLET ORAL at 21:02

## 2017-12-09 RX ADMIN — DULOXETINE HYDROCHLORIDE 60 MG: 60 CAPSULE, DELAYED RELEASE ORAL at 09:11

## 2017-12-09 RX ADMIN — SODIUM HYPOCHLORITE: 1.25 SOLUTION TOPICAL at 21:03

## 2017-12-09 RX ADMIN — NYSTATIN 1 APPLICATION: 100000 POWDER TOPICAL at 09:13

## 2017-12-09 RX ADMIN — GABAPENTIN 600 MG: 300 CAPSULE ORAL at 05:31

## 2017-12-09 RX ADMIN — DULOXETINE HYDROCHLORIDE 60 MG: 60 CAPSULE, DELAYED RELEASE ORAL at 17:21

## 2017-12-09 RX ADMIN — METRONIDAZOLE 500 MG: 500 INJECTION, SOLUTION INTRAVENOUS at 05:35

## 2017-12-09 RX ADMIN — AZTREONAM 2 G: 2 INJECTION, POWDER, FOR SOLUTION INTRAMUSCULAR; INTRAVENOUS at 06:37

## 2017-12-09 RX ADMIN — GABAPENTIN 600 MG: 300 CAPSULE ORAL at 21:02

## 2017-12-09 RX ADMIN — TOPIRAMATE 100 MG: 100 TABLET, FILM COATED ORAL at 15:32

## 2017-12-09 RX ADMIN — ZINC OXIDE: 200 OINTMENT TOPICAL at 09:13

## 2017-12-09 RX ADMIN — VANCOMYCIN HYDROCHLORIDE 2000 MG: 5 INJECTION, POWDER, LYOPHILIZED, FOR SOLUTION INTRAVENOUS at 23:38

## 2017-12-09 RX ADMIN — TOPIRAMATE 100 MG: 100 TABLET, FILM COATED ORAL at 09:11

## 2017-12-09 RX ADMIN — METHENAMINE HIPPURATE 1 G: 1 TABLET ORAL at 17:21

## 2017-12-09 RX ADMIN — ARIPIPRAZOLE 10 MG: 10 TABLET ORAL at 21:02

## 2017-12-09 RX ADMIN — PANTOPRAZOLE SODIUM 40 MG: 40 TABLET, DELAYED RELEASE ORAL at 05:31

## 2017-12-09 RX ADMIN — Medication 5 ML: at 21:00

## 2017-12-09 RX ADMIN — SUCRALFATE 1 G: 1 TABLET ORAL at 11:46

## 2017-12-09 RX ADMIN — VANCOMYCIN HYDROCHLORIDE 2000 MG: 5 INJECTION, POWDER, LYOPHILIZED, FOR SOLUTION INTRAVENOUS at 01:34

## 2017-12-09 RX ADMIN — SUCRALFATE 1 G: 1 TABLET ORAL at 09:11

## 2017-12-09 RX ADMIN — AZTREONAM 2 G: 2 INJECTION, POWDER, FOR SOLUTION INTRAMUSCULAR; INTRAVENOUS at 14:32

## 2017-12-09 NOTE — PROGRESS NOTES
[facility] HOSPITALIST PROGRESS NOTE     Patient Identification:  Name:  Ken Krueger  Age:  42 y.o.  Sex:  male  :  1975  MRN:  [id number]  Visit Number:  [id number]  Primary Care Provider:  No Known Provider    Length of stay:  4    Chief complaint:  42 y.o. old male admitted with altered mental status, sepsis and infected decubitus ulcers      Subjective:    No acute issues overnight.  Pt developed epistaxis while blowing his nose. Pt also complaining of soreness on his tongue.     Current Hospital Meds:    ARIPiprazole 10 mg Oral Nightly   aztreonam 2 g Intravenous Q8H   DULoxetine 60 mg Oral BID   gabapentin 600 mg Oral Q8H   methenamine 1 g Oral BID   metroNIDAZOLE 500 mg Intravenous Q8H   nystatin susp + lidocaine viscous 5 mL Swish & Swallow 4x Daily   pantoprazole 40 mg Oral QAM   sodium hypochlorite  Topical TID   sucralfate 1 g Oral 4x Daily AC & at Bedtime   topiramate 100 mg Oral TID   vancomycin 2,000 mg Intravenous Q12H   zinc oxide  Topical TID       sodium chloride 100 mL/hr Last Rate: 100 mL/hr (17 1726)     ----------------------------------------------------------------------------------------------------------------------  Vital Signs:  Temp:  [98 °F (36.7 °C)-98.8 °F (37.1 °C)] 98.6 °F (37 °C)  Heart Rate:  [] 102  Resp:  [18] 18  BP: (120-145)/(52-80) 120/52  Last 3 weights    17  1137 17  1744   Weight: 127 kg (280 lb) 113 kg (249 lb 11.2 oz)     Body mass index is 33.87 kg/(m^2).    Intake/Output Summary (Last 24 hours) at 17 181  Last data filed at 17 1805   Gross per 24 hour   Intake             1800 ml   Output             2675 ml   Net             -875 ml     Diet Regular; Consistent Carbohydrate  ----------------------------------------------------------------------------------------------------------------------  Physical exam:  Constitutional:  Well-developed and well-nourished.     HENT:  Head:  Normocephalic and atraumatic.   Mouth:  Moist mucous membranes.    Eyes:  Conjunctivae and EOM are normal.  Pupils are equal, round, and reactive to light.   Neck:  Neck supple.  No JVD present.    Cardiovascular:  Regular rate and rhythm. S1+S2. No murmur, rubs or gallops.   Pulmonary/Chest: Clear to auscultation bilaterally.   Abdominal:  Soft. Non-tender. No viscera palpable.  Bowel sounds audible.   Musculoskeletal:  Left above-the-knee amputation.    Peripheral vascular: Bilateral dorsalis pedis palpable. No edema.   Neurological:  Alert and oriented to person, place, and time.  Cranial nerves grossly intact. Strength bilaterally symmetrical.  Skin: small stage III ulcer on cocyx.  One deep stage IV ulcer is in right gluteal region measuring 4x4x6 cm with purulent drainage and  A deep stage IV ulcer in the left ischial area measuring [id number] with purulent discharge.   ----------------------------------------------------------------------------------------------------------------------  Tele:  Sinus rhythm  ----------------------------------------------------------------------------------------------------------------------    Results from last 7 days  Lab Units 12/06/17  0910 12/06/17  0351 12/05/17  2201 12/05/17  1217   CK TOTAL U/L  --   --  123 290*   CKMB ng/mL  --   --  3.16 6.22*   CK MB INDEX %  --   --  2.6 2.1   TROPONIN I ng/mL 0.018 0.022 0.020 0.036       Results from last 7 days  Lab Units 12/09/17  0109 12/08/17  0004 12/07/17  0122  12/05/17  1217   CRP mg/dL 13.14* 16.53* 22.30*  < >  --    LACTATE mmol/L  --   --   --   --  1.1   WBC 10*3/mm3 11.62 11.52 13.60*  < > 20.81*   HEMOGLOBIN g/dL 9.6* 8.9* 8.7*  < > 8.6*   HEMATOCRIT % 31.7* 29.4* 29.2*  < > 30.4*   MCV fL 80.1 80.1 80.7  < > 79.8*   MCHC g/dL 30.3* 30.3* 29.8*  < > 28.3*   PLATELETS 10*3/mm3 620* 601* 632*  < > 775*   < > = values in this interval not displayed.    Results from last 7 days  Lab Units 12/05/17  2149   PH, ARTERIAL pH units 7.363   PO2 ART mm Hg  98.9   PCO2, ARTERIAL mm Hg 28.8*   HCO3 ART mmol/L 16.0*       Results from last 7 days  Lab Units 12/09/17  0109 12/08/17  0004 12/07/17  0122  12/06/17  0351  12/05/17  1850 12/05/17  1217   SODIUM mmol/L 139 138 140  --  141  < >  --  140   POTASSIUM mmol/L 4.2 4.0 4.3  < > 3.3*  < >  --  3.9   MAGNESIUM mg/dL  --   --  2.2  --  1.6*  --  1.6*  --    CHLORIDE mmol/L 108 109 111  --  113*  < >  --  106   CO2 mmol/L 21.8* 21.6* 20.6*  --  20.2*  < >  --  29.0   BUN mg/dL 14 13 13  --  15  < >  --  22*   CREATININE mg/dL 0.50 0.62 0.68  --  0.76  < >  --  1.52*   EGFR IF NONAFRICN AM mL/min/1.73 >150 144 129  --  114  < >  --  51*   CALCIUM mg/dL 8.5 8.5 8.3  --  8.4  < >  --  9.9   GLUCOSE mg/dL 88 84 100  --  100  < >  --  95   ALBUMIN g/dL  --   --   --   --  3.00*  --   --  3.90   BILIRUBIN mg/dL  --   --   --   --  0.2  --   --  0.3   ALK PHOS U/L  --   --   --   --  93  --   --  123   AST (SGOT) U/L  --   --   --   --  24  --   --  33   ALT (SGPT) U/L  --   --   --   --  21  --   --  23   < > = values in this interval not displayed.Estimated Creatinine Clearance: 255 mL/min (by C-G formula based on Cr of 0.5).    No results found for: AMMONIA      Blood Culture   Date Value Ref Range Status   12/05/2017 No growth at less than 24 hours  Preliminary   12/05/2017 No growth at 24 hours  Preliminary   12/05/2017 No growth at 24 hours  Preliminary     Urine Culture   Date Value Ref Range Status   12/05/2017 Culture in progress  Preliminary     Wound Culture   Date Value Ref Range Status   12/05/2017 Culture in progress  Preliminary          I have personally looked at the labs and they are summarized above.  ----------------------------------------------------------------------------------------------------------------------  Imaging Results (last 24 hours)     ** No results found for the last 24 hours. **         ----------------------------------------------------------------------------------------------------------------------  Assessment and Plan:    - Sepsis  Improving.  CRP Is improving and WBCs normalized.  Patient is afebrile and VS stable.  Due to infected decubitus ulcer and UTI.  Continue IV fluids and Vancomycin, aztreonam and Flagyl.   no growth on blood cultures so far.  Wound culture growing Escherichia coli and group B BHS.      - Infected decubitus ulcers/osteomyelitis  Continue broad-spectrum antibiotics as outlined above.  Continue wound care.  ID following and appreciate help from ID.  Discussed with Dr. Blair, patient needs pelvic debridement.  His infection is currently improving.  I will continue him on antibiotics and plan for referral to general surgery/orthopedic surgery as an outpatient for pelvic debridement.  Dr. Blair agreed with the plan.     - Altered mental status due to metabolic encephalopathy  Resolved..    Secondary to sepsis and infected decubitus ulcers.  CT head showed no acute intracranial abnormality.     - Acute urinary tract infection due to Klebsiella  Continue antibiotics as outlined above.       - Metabolic acidosis  Improving.   Secondary to sepsis and infection.  Continue IV fluids and monitor electrolytes     - Acute kidney injury  Resolved. Continue IV fluids and monitor renal function.     - Anemia  Microcytic and hypochromic.  Likely due to anemia of chronic disease given his recurrent infections.  We'll continue to monitor H&H and replace as needed.     - Prophylaxis  DVT: Heparin SQ  GI: Pepcid     - Fluids and nutrition  NS @ 100ml/hr.   Regular diet.      Pt is at high risk for decompensation due to sepsis, infected decubitus ulcer, acute urinary tract infection, metabolic acidosis, acute kidney injury.  Discussed plan of care with the patient understood and agreed with the plan.    Inocencia Montano MD  12/09/17  6:14 PM

## 2017-12-09 NOTE — PLAN OF CARE
Problem: Patient Care Overview (Adult)  Goal: Plan of Care Review    12/08/17 1512 12/08/17 2000   Coping/Psychosocial Response Interventions   Plan Of Care Reviewed With --  patient   Patient Care Overview   Progress improving --

## 2017-12-09 NOTE — PLAN OF CARE
Problem: Patient Care Overview (Adult)  Goal: Adult Individualization and Mutuality  Outcome: Ongoing (interventions implemented as appropriate)  Goal: Discharge Needs Assessment  Outcome: Ongoing (interventions implemented as appropriate)    Problem: Fall Risk (Adult)  Goal: Absence of Falls  Outcome: Ongoing (interventions implemented as appropriate)    Problem: Skin Integrity Impairment, Risk/Actual (Adult)  Goal: Skin Integrity/Wound Healing  Outcome: Ongoing (interventions implemented as appropriate)    Problem: Pressure Ulcer Risk (Alejandro Scale) (Adult,Obstetrics,Pediatric)  Goal: Skin Integrity  Outcome: Ongoing (interventions implemented as appropriate)    Problem: Infection, Risk/Actual (Adult)  Goal: Infection Prevention/Resolution  Outcome: Ongoing (interventions implemented as appropriate)    Problem: Pressure Ulcer (Adult)  Goal: Signs and Symptoms of Listed Potential Problems Will be Absent or Manageable (Pressure Ulcer)  Outcome: Ongoing (interventions implemented as appropriate)    Problem: Self-Care Deficit (Adult,Obstetrics,Pediatric)  Goal: Improved Ability to Perform BADL and IADL  Outcome: Ongoing (interventions implemented as appropriate)    Problem: Confusion, Acute (Adult)  Goal: Cognitive/Functional Impairments Minimized  Outcome: Ongoing (interventions implemented as appropriate)  Goal: Safety  Outcome: Ongoing (interventions implemented as appropriate)    Problem: Colostomy (Adult)  Goal: Signs and Symptoms of Listed Potential Problems Will be Absent or Manageable (Colostomy)  Outcome: Ongoing (interventions implemented as appropriate)

## 2017-12-09 NOTE — PROGRESS NOTES
"  I have personally seen and examined the patient today and discussed overnight interval progress and pertinent issues with nursing staff.    Subjective:    Bilateral ulcers, very deep, with necrosis, greenish bloody drainage with odor.  Colostomy with stool noted.  Improving CRP level and normal white count.  No fever overnight.  Patient without complaints.    History taken from: patient chart RN      Vital Signs    /80 (BP Location: Left arm, Patient Position: Lying)  Pulse 62  Temp 98 °F (36.7 °C) (Oral)   Resp 18  Ht 182.9 cm (72\")  Wt 113 kg (249 lb 11.2 oz)  SpO2 99%  BMI 33.87 kg/m2    Temp:  [98 °F (36.7 °C)-99.1 °F (37.3 °C)] 98 °F (36.7 °C)      Intake/Output Summary (Last 24 hours) at 12/09/17 0909  Last data filed at 12/09/17 0438   Gross per 24 hour   Intake             2460 ml   Output             2725 ml   Net             -265 ml     Intake & Output (last 3 days)       12/06 0701 - 12/07 0700 12/07 0701 - 12/08 0700 12/08 0701 - 12/09 0700 12/09 0701 - 12/10 0700    P.O. 900 1560 1440     I.V. (mL/kg)        Blood 1200       IV Piggyback   1500     Total Intake(mL/kg) 2100 (18.5) 1560 (13.8) 2940 (26)     Urine (mL/kg/hr) 3450 (1.3) 2600 (1) 3000 (1.1)     Stool 750 (0.3) 650 (0.2) 325 (0.1)     Total Output 4200 3250 3325      Net -2100 -1690 -385              Unmeasured Urine Occurrence  1050 x      Unmeasured Stool Occurrence  1 x            Physical exam:    General Appearance:    Alert, cooperative, in no acute distress   Head:    Normocephalic, without obvious abnormality, atraumatic   Eyes:            Lids and lashes normal, conjunctivae and sclerae normal, no   icterus, no pallor, corneas clear, PERRLA   Ears:    Ears appear intact with no abnormalities noted   Throat:   No oral lesions, no thrush, oral mucosa moist   Neck:   No adenopathy, supple, trachea midline, no thyromegaly, no   carotid bruit, no JVD   Back:     No tenderness to percussion or palpation, range of motion   " normal   Lungs:     Clear to auscultation,respirations regular, even and unlabored. No wheezing, no ronchi and no crackles.    Heart:    Regular rhythm and normal rate, normal S1 and S2, no            murmur, no gallop, no rub, no click   Chest Wall:    No abnormalities observed   Abdomen:     Normal bowel sounds, no masses, no organomegaly, soft        non-tender, non-distended, no guarding, no rebound                tenderness   Rectal:     Deferred   Extremities:   Moves all extremities well, no edema, no cyanosis, no             redness   Pulses:   Pulses palpable and equal bilaterally   Skin:   No bleeding, bruising or rash   Lymph nodes:   No palpable adenopathy   Neurologic:   Awake, alert and oriented x 3. Following commands.           Results:      Results from last 7 days  Lab Units 12/09/17  0109 12/08/17  0004 12/07/17  0122 12/06/17  0420 12/05/17  2201 12/05/17  1217   WBC 10*3/mm3 11.62 11.52 13.60* 17.08* 14.54* 20.81*     Lab Results   Component Value Date    NEUTROABS 8.02 (H) 12/09/2017         Results from last 7 days  Lab Units 12/09/17  0109   CREATININE mg/dL 0.50         Results from last 7 days  Lab Units 12/09/17  0109 12/08/17  0004 12/07/17  0122   CRP mg/dL 13.14* 16.53* 22.30*       Imaging Results (last 24 hours)     ** No results found for the last 24 hours. **            Results Review:    I have personally reviewed laboratory data, culture results, radiology studies and antimicrobial therapy.    Hospital Medications (active)       Dose Frequency Start End    acetaminophen (TYLENOL) tablet 650 mg 650 mg Every 6 Hours PRN 12/5/2017     Sig - Route: Take 2 tablets by mouth Every 6 (Six) Hours As Needed for Mild Pain  or Fever. - Oral    albuterol (PROVENTIL) nebulizer solution 0.083% 2.5 mg/3mL 2.5 mg Every 6 Hours PRN 12/6/2017     Sig - Route: Take 2.5 mg by nebulization Every 6 (Six) Hours As Needed for Wheezing or Shortness of Air. - Nebulization    ARIPiprazole (ABILIFY) tablet 10  "mg 10 mg Nightly 12/6/2017     Sig - Route: Take 1 tablet by mouth Every Night. - Oral    aztreonam (AZACTAM) 2 g/100 mL 0.9% NS (mbp) 2 g Every 8 Hours 12/6/2017 12/16/2017    Sig - Route: Infuse 100 mL into a venous catheter Every 8 (Eight) Hours. - Intravenous    DULoxetine (CYMBALTA) DR capsule 60 mg 60 mg 2 Times Daily 12/6/2017     Sig - Route: Take 1 capsule by mouth 2 (Two) Times a Day. - Oral    famotidine (PEPCID) injection 20 mg 20 mg Every 12 Hours Scheduled 12/5/2017     Sig - Route: Infuse 2 mL into a venous catheter Every 12 (Twelve) Hours. - Intravenous    gabapentin (NEURONTIN) capsule 600 mg 600 mg Every 8 Hours Scheduled 12/6/2017     Sig - Route: Take 2 capsules by mouth Every 8 (Eight) Hours. - Oral    HYDROmorphone (DILAUDID) 1 MG/ML injection  - ADS Override Pull   12/6/2017 12/7/2017    Notes to Pharmacy: Created by cabinet override    ipratropium-albuterol (DUO-NEB) nebulizer solution 3 mL 3 mL 4 Times Daily - RT 12/5/2017     Sig - Route: Take 3 mL by nebulization 4 (Four) Times a Day. - Nebulization    Magnesium Sulfate 2 gram Bolus, followed by 8 gram infusion (total Mg dose 10 grams)- Mg less than or equal to 1mg/dL 2 g As Needed 12/6/2017     Sig - Route: Infuse 50 mL into a venous catheter As Needed (Mg less than or equal to 1mg/dL). - Intravenous    Cosign for Ordering: Accepted by Inocencia Montano MD on 12/6/2017  8:10 AM    Linked Group 1:  \"Or\" Linked Group Details        magnesium sulfate 4 gram infusion- Mg 1.6-1.9 mg/dL 4 g As Needed 12/6/2017     Sig - Route: Infuse 100 mL into a venous catheter As Needed (Mg 1.6-1.9 mg/dL). - Intravenous    Cosign for Ordering: Accepted by Inocencia Montano MD on 12/6/2017  8:10 AM    Linked Group 1:  \"Or\" Linked Group Details        magnesium sulfate 4 gram infusion- Mg 1.6-1.9 mg/dL 4 g Once 12/6/2017 12/6/2017    Sig - Route: Infuse 100 mL into a venous catheter 1 (One) Time. - Intravenous    Magnesium Sulfate 6 gram Infusion (2 gm x " "3) -Mg 1.1 -1.5 mg/dL 2 g As Needed 12/6/2017     Sig - Route: Infuse 50 mL into a venous catheter As Needed (Mg 1.1 -1.5 mg/dL). - Intravenous    Cosign for Ordering: Accepted by Inocencia Montano MD on 12/6/2017  8:10 AM    Linked Group 1:  \"Or\" Linked Group Details        methenamine (HIPREX) tablet 1 g 1 g 2 Times Daily 12/6/2017     Sig - Route: Take 1 tablet by mouth 2 (Two) Times a Day. - Oral    metroNIDAZOLE (FLAGYL) IVPB 500 mg 500 mg Every 8 Hours 12/5/2017 12/15/2017    Sig - Route: Infuse 100 mL into a venous catheter Every 8 (Eight) Hours. - Intravenous    nitroglycerin (NITROSTAT) SL tablet 0.4 mg 0.4 mg Every 5 Minutes PRN 12/5/2017     Sig - Route: Place 1 tablet under the tongue Every 5 (Five) Minutes As Needed for Chest Pain (if systolic BP greater than 100 mm/Hg.). - Sublingual    Cosign for Ordering: Accepted by Inocencia Montano MD on 12/5/2017  6:13 PM    nystatin (MYCOSTATIN) powder 1 application 1 application 3 Times Daily PRN 12/6/2017     Sig - Route: Apply 1 application topically 3 (Three) Times a Day As Needed (skin). - Topical    pantoprazole (PROTONIX) EC tablet 40 mg 40 mg Every Morning 12/6/2017     Sig - Route: Take 1 tablet by mouth Every Morning. - Oral    potassium chloride (K-DUR,KLOR-CON) CR tablet 40 mEq 40 mEq Every 4 Hours 12/6/2017 12/6/2017    Sig - Route: Take 2 tablets by mouth Every 4 (Four) Hours. - Oral    potassium chloride (KLOR-CON) packet 40 mEq 40 mEq As Needed 12/6/2017     Sig - Route: Take 40 mEq by mouth As Needed (potassium replacement, see admin instructions). - Oral    Cosign for Ordering: Accepted by Inocencia Montano MD on 12/6/2017  8:10 AM    Linked Group 2:  \"Or\" Linked Group Details        potassium chloride (MICRO-K) CR capsule 40 mEq 40 mEq As Needed 12/6/2017     Sig - Route: Take 4 capsules by mouth As Needed (potassium replacement.  see admin instructions). - Oral    Cosign for Ordering: Accepted by Inocencia Montano MD on 12/6/2017  " "8:10 AM    Linked Group 2:  \"Or\" Linked Group Details        potassium chloride 10 mEq in 100 mL IVPB 10 mEq Every 1 Hour PRN 12/6/2017     Sig - Route: Infuse 100 mL into a venous catheter Every 1 (One) Hour As Needed (potassium protocol PERIPHERAL - see admin instructions). - Intravenous    Cosign for Ordering: Accepted by Inocencia Montano MD on 12/6/2017  8:10 AM    Linked Group 2:  \"Or\" Linked Group Details        sodium chloride 0.9 % flush 1-10 mL 1-10 mL As Needed 12/5/2017     Sig - Route: Infuse 1-10 mL into a venous catheter As Needed for Line Care. - Intravenous    Cosign for Ordering: Accepted by Inocencia Montano MD on 12/5/2017  6:13 PM    sodium chloride 0.9 % infusion 100 mL/hr Continuous 12/5/2017     Sig - Route: Infuse 100 mL/hr into a venous catheter Continuous. - Intravenous    sodium hypochlorite (DAKIN'S 1/4 STRENGTH) 0.125 % topical solution 0.125% solution  3 Times Daily 12/6/2017     Sig - Route: Apply  topically 3 (Three) Times a Day. - Topical    Cosign for Ordering: Accepted by Sung Blair MD on 12/6/2017  4:01 PM    sucralfate (CARAFATE) tablet 1 g 1 g 4 Times Daily Before Meals & Nightly 12/6/2017     Sig - Route: Take 1 tablet by mouth 4 (Four) Times a Day Before Meals & at Bedtime. - Oral    topiramate (TOPAMAX) tablet 100 mg 100 mg 3 Times Daily 12/6/2017     Sig - Route: Take 1 tablet by mouth 3 (Three) Times a Day. - Oral    vancomycin (VANCOCIN) 2,000 mg in sodium chloride 0.9 % 500 mL IVPB 2,000 mg Every 12 Hours 12/6/2017 12/20/2017    Sig - Route: Infuse 2,000 mg into a venous catheter Every 12 (Twelve) Hours. - Intravenous    zinc oxide 20 % ointment  3 Times Daily 12/6/2017     Sig - Route: Apply  topically 3 (Three) Times a Day. - Topical    Pharmacy to dose vancomycin (Discontinued)  Continuous PRN 12/5/2017 12/6/2017    Sig - Route: Continuous As Needed for Consult. - Does not apply    Reason for Discontinue: Duplicate order    sodium hypochlorite (LISBETHIN'S " 1/4 STRENGTH) 0.125 % topical solution 0.125% solution (Discontinued)  2 Times Daily 12/6/2017 12/6/2017    Sig - Route: Apply  topically 2 (Two) Times a Day. - Topical    Cosign for Ordering: Accepted by Sung Blair MD on 12/6/2017  4:01 PM    vancomycin (VANCOCIN) 2,000 mg in sodium chloride 0.9 % 500 mL IVPB (Discontinued) 2,000 mg Every 24 Hours 12/6/2017 12/6/2017    Sig - Route: Infuse 2,000 mg into a venous catheter Daily. - Intravenous            Cultures:    Blood Culture   Date Value Ref Range Status   12/05/2017 No growth at 24 hours  Preliminary   12/05/2017 No growth at 24 hours  Preliminary   12/05/2017 No growth at 24 hours  Preliminary     Urine Culture   Date Value Ref Range Status   12/05/2017 Culture in progress  Preliminary     Wound Culture   Date Value Ref Range Status   12/05/2017 (A)  Preliminary    Light growth (2+) Gram Negative Bacilli, Morphology consistent with Escherichia / Citrobacter           Assessment/Plan     ASSESSMENT:    1.  Severe sepsis with encephalopathy  2.  Aspiration pneumonia  3.  Decubitus ulcers    PLAN:    Bilateral ulcers, very deep, with necrosis, greenish bloody drainage with odor.  Colostomy with stool noted.  Improving CRP level and normal white count.  No fever overnight.  Patient without complaints.      Surgical consult recommends transfer to tertiary care center due to complexity of care.  From ID standpoint patient seems stable with normal white count and significantly improving CRP level on broad-spectrum coverage with vancomycin, Azactam and Flagyl.    We will continue to follow closely.    Patient's findings and recommendations were discussed with patient, nursing staff, primary care team and consulting provider    Code Status: Full Code     GUANACO Lawrence  12/09/17  9:09 AM

## 2017-12-10 LAB
ANION GAP SERPL CALCULATED.3IONS-SCNC: 7.4 MMOL/L (ref 3.6–11.2)
BACTERIA SPEC AEROBE CULT: NORMAL
BASOPHILS # BLD AUTO: 0.05 10*3/MM3 (ref 0–0.3)
BASOPHILS NFR BLD AUTO: 0.4 % (ref 0–2)
BUN BLD-MCNC: 12 MG/DL (ref 7–21)
BUN/CREAT SERPL: 23.1 (ref 7–25)
CALCIUM SPEC-SCNC: 9.2 MG/DL (ref 7.7–10)
CHLORIDE SERPL-SCNC: 107 MMOL/L (ref 99–112)
CO2 SERPL-SCNC: 20.6 MMOL/L (ref 24.3–31.9)
CREAT BLD-MCNC: 0.52 MG/DL (ref 0.43–1.29)
CRP SERPL-MCNC: 9.07 MG/DL (ref 0–0.99)
DEPRECATED RDW RBC AUTO: 49.3 FL (ref 37–54)
EOSINOPHIL # BLD AUTO: 0.45 10*3/MM3 (ref 0–0.7)
EOSINOPHIL NFR BLD AUTO: 3.8 % (ref 0–5)
ERYTHROCYTE [DISTWIDTH] IN BLOOD BY AUTOMATED COUNT: 17.9 % (ref 11.5–14.5)
FOLATE SERPL-MCNC: 10.1 NG/ML (ref 5.4–20)
GFR SERPL CREATININE-BSD FRML MDRD: >150 ML/MIN/1.73
GLUCOSE BLD-MCNC: 95 MG/DL (ref 70–110)
GLUCOSE BLDC GLUCOMTR-MCNC: 121 MG/DL (ref 70–130)
HCT VFR BLD AUTO: 33.3 % (ref 42–52)
HGB BLD-MCNC: 9.8 G/DL (ref 14–18)
IMM GRANULOCYTES # BLD: 0.14 10*3/MM3 (ref 0–0.03)
IMM GRANULOCYTES NFR BLD: 1.2 % (ref 0–0.5)
LYMPHOCYTES # BLD AUTO: 2.53 10*3/MM3 (ref 1–3)
LYMPHOCYTES NFR BLD AUTO: 21.1 % (ref 21–51)
MAGNESIUM SERPL-MCNC: 1.8 MG/DL (ref 1.7–2.6)
MCH RBC QN AUTO: 23.7 PG (ref 27–33)
MCHC RBC AUTO-ENTMCNC: 29.4 G/DL (ref 33–37)
MCV RBC AUTO: 80.6 FL (ref 80–94)
MONOCYTES # BLD AUTO: 0.62 10*3/MM3 (ref 0.1–0.9)
MONOCYTES NFR BLD AUTO: 5.2 % (ref 0–10)
NEUTROPHILS # BLD AUTO: 8.19 10*3/MM3 (ref 1.4–6.5)
NEUTROPHILS NFR BLD AUTO: 68.3 % (ref 30–70)
OSMOLALITY SERPL CALC.SUM OF ELEC: 269.7 MOSM/KG (ref 273–305)
PHOSPHATE SERPL-MCNC: 4.9 MG/DL (ref 2.7–4.5)
PLATELET # BLD AUTO: 641 10*3/MM3 (ref 130–400)
PMV BLD AUTO: 9.1 FL (ref 6–10)
POTASSIUM BLD-SCNC: 3.5 MMOL/L (ref 3.5–5.3)
RBC # BLD AUTO: 4.13 10*6/MM3 (ref 4.7–6.1)
SODIUM BLD-SCNC: 135 MMOL/L (ref 135–153)
VIT B12 BLD-MCNC: 579 PG/ML (ref 211–911)
WBC NRBC COR # BLD: 11.98 10*3/MM3 (ref 4.5–12.5)

## 2017-12-10 PROCEDURE — 25010000002 VANCOMYCIN PER 500 MG

## 2017-12-10 PROCEDURE — 82607 VITAMIN B-12: CPT | Performed by: INTERNAL MEDICINE

## 2017-12-10 PROCEDURE — 82746 ASSAY OF FOLIC ACID SERUM: CPT | Performed by: INTERNAL MEDICINE

## 2017-12-10 PROCEDURE — 94799 UNLISTED PULMONARY SVC/PX: CPT

## 2017-12-10 PROCEDURE — 82962 GLUCOSE BLOOD TEST: CPT

## 2017-12-10 PROCEDURE — 83735 ASSAY OF MAGNESIUM: CPT | Performed by: PHYSICIAN ASSISTANT

## 2017-12-10 PROCEDURE — 80048 BASIC METABOLIC PNL TOTAL CA: CPT | Performed by: PHYSICIAN ASSISTANT

## 2017-12-10 PROCEDURE — 99232 SBSQ HOSP IP/OBS MODERATE 35: CPT | Performed by: INTERNAL MEDICINE

## 2017-12-10 PROCEDURE — 86140 C-REACTIVE PROTEIN: CPT | Performed by: PHYSICIAN ASSISTANT

## 2017-12-10 PROCEDURE — 85025 COMPLETE CBC W/AUTO DIFF WBC: CPT | Performed by: INTERNAL MEDICINE

## 2017-12-10 PROCEDURE — 84100 ASSAY OF PHOSPHORUS: CPT | Performed by: PHYSICIAN ASSISTANT

## 2017-12-10 RX ADMIN — VANCOMYCIN HYDROCHLORIDE 2000 MG: 5 INJECTION, POWDER, LYOPHILIZED, FOR SOLUTION INTRAVENOUS at 23:04

## 2017-12-10 RX ADMIN — SODIUM HYPOCHLORITE: 1.25 SOLUTION TOPICAL at 21:18

## 2017-12-10 RX ADMIN — Medication 5 ML: at 21:18

## 2017-12-10 RX ADMIN — SODIUM HYPOCHLORITE: 1.25 SOLUTION TOPICAL at 09:14

## 2017-12-10 RX ADMIN — ZINC OXIDE: 200 OINTMENT TOPICAL at 09:14

## 2017-12-10 RX ADMIN — ZINC OXIDE: 200 OINTMENT TOPICAL at 16:16

## 2017-12-10 RX ADMIN — GABAPENTIN 600 MG: 300 CAPSULE ORAL at 21:17

## 2017-12-10 RX ADMIN — METHENAMINE HIPPURATE 1 G: 1 TABLET ORAL at 17:11

## 2017-12-10 RX ADMIN — SUCRALFATE 1 G: 1 TABLET ORAL at 08:24

## 2017-12-10 RX ADMIN — PANTOPRAZOLE SODIUM 40 MG: 40 TABLET, DELAYED RELEASE ORAL at 05:17

## 2017-12-10 RX ADMIN — Medication 5 ML: at 17:11

## 2017-12-10 RX ADMIN — DULOXETINE HYDROCHLORIDE 60 MG: 60 CAPSULE, DELAYED RELEASE ORAL at 17:11

## 2017-12-10 RX ADMIN — Medication 5 ML: at 11:41

## 2017-12-10 RX ADMIN — SODIUM HYPOCHLORITE: 1.25 SOLUTION TOPICAL at 16:15

## 2017-12-10 RX ADMIN — Medication 5 ML: at 09:14

## 2017-12-10 RX ADMIN — AZTREONAM 2 G: 2 INJECTION, POWDER, FOR SOLUTION INTRAMUSCULAR; INTRAVENOUS at 06:50

## 2017-12-10 RX ADMIN — NYSTATIN 1 APPLICATION: 100000 POWDER TOPICAL at 09:14

## 2017-12-10 RX ADMIN — GABAPENTIN 600 MG: 300 CAPSULE ORAL at 14:03

## 2017-12-10 RX ADMIN — DULOXETINE HYDROCHLORIDE 60 MG: 60 CAPSULE, DELAYED RELEASE ORAL at 09:14

## 2017-12-10 RX ADMIN — GABAPENTIN 600 MG: 300 CAPSULE ORAL at 05:18

## 2017-12-10 RX ADMIN — AZTREONAM 2 G: 2 INJECTION, POWDER, FOR SOLUTION INTRAMUSCULAR; INTRAVENOUS at 14:05

## 2017-12-10 RX ADMIN — NYSTATIN 1 APPLICATION: 100000 POWDER TOPICAL at 16:15

## 2017-12-10 RX ADMIN — ZINC OXIDE: 200 OINTMENT TOPICAL at 21:18

## 2017-12-10 RX ADMIN — SUCRALFATE 1 G: 1 TABLET ORAL at 17:11

## 2017-12-10 RX ADMIN — METRONIDAZOLE 500 MG: 500 INJECTION, SOLUTION INTRAVENOUS at 21:18

## 2017-12-10 RX ADMIN — METRONIDAZOLE 500 MG: 500 INJECTION, SOLUTION INTRAVENOUS at 14:03

## 2017-12-10 RX ADMIN — TOPIRAMATE 100 MG: 100 TABLET, FILM COATED ORAL at 21:17

## 2017-12-10 RX ADMIN — SODIUM CHLORIDE 100 ML/HR: 9 INJECTION, SOLUTION INTRAVENOUS at 21:20

## 2017-12-10 RX ADMIN — AZTREONAM 2 G: 2 INJECTION, POWDER, FOR SOLUTION INTRAMUSCULAR; INTRAVENOUS at 23:04

## 2017-12-10 RX ADMIN — VANCOMYCIN HYDROCHLORIDE 2000 MG: 5 INJECTION, POWDER, LYOPHILIZED, FOR SOLUTION INTRAVENOUS at 11:40

## 2017-12-10 RX ADMIN — METRONIDAZOLE 500 MG: 500 INJECTION, SOLUTION INTRAVENOUS at 05:08

## 2017-12-10 RX ADMIN — SUCRALFATE 1 G: 1 TABLET ORAL at 11:40

## 2017-12-10 RX ADMIN — TOPIRAMATE 100 MG: 100 TABLET, FILM COATED ORAL at 16:15

## 2017-12-10 RX ADMIN — ARIPIPRAZOLE 10 MG: 10 TABLET ORAL at 21:17

## 2017-12-10 RX ADMIN — TOPIRAMATE 100 MG: 100 TABLET, FILM COATED ORAL at 09:14

## 2017-12-10 RX ADMIN — METHENAMINE HIPPURATE 1 G: 1 TABLET ORAL at 09:14

## 2017-12-10 RX ADMIN — SUCRALFATE 1 G: 1 TABLET ORAL at 21:17

## 2017-12-10 NOTE — PROGRESS NOTES
[facility] HOSPITALIST PROGRESS NOTE     Patient Identification:  Name:  Ken Krueger  Age:  42 y.o.  Sex:  male  :  1975  MRN:  [id number]  Visit Number:  [id number]  Primary Care Provider:  No Known Provider    Length of stay:  5    Chief complaint:  42 y.o. old male admitted with altered mental status, sepsis and infected decubitus ulcers      Subjective:    No acute issues overnight.  Patient denies any complaints.  Patient has not had any more epistaxis.     Current Hospital Meds:    ARIPiprazole 10 mg Oral Nightly   aztreonam 2 g Intravenous Q8H   DULoxetine 60 mg Oral BID   gabapentin 600 mg Oral Q8H   methenamine 1 g Oral BID   metroNIDAZOLE 500 mg Intravenous Q8H   nystatin susp + lidocaine viscous 5 mL Swish & Swallow 4x Daily   pantoprazole 40 mg Oral QAM   sodium hypochlorite  Topical TID   sucralfate 1 g Oral 4x Daily AC & at Bedtime   topiramate 100 mg Oral TID   vancomycin 2,000 mg Intravenous Q12H   zinc oxide  Topical TID       sodium chloride 100 mL/hr Last Rate: 100 mL/hr (17 1726)     ----------------------------------------------------------------------------------------------------------------------  Vital Signs:  Temp:  [98 °F (36.7 °C)-98.9 °F (37.2 °C)] 98.9 °F (37.2 °C)  Heart Rate:  [] 96  Resp:  [20] 20  BP: (117-125)/(70-85) 117/74  Last 3 weights    17  1137 17  1744   Weight: 127 kg (280 lb) 113 kg (249 lb 11.2 oz)     Body mass index is 33.87 kg/(m^2).    Intake/Output Summary (Last 24 hours) at 12/10/17 1806  Last data filed at 12/10/17 1803   Gross per 24 hour   Intake             1080 ml   Output             3700 ml   Net            -2620 ml     Diet Regular; Consistent Carbohydrate  ----------------------------------------------------------------------------------------------------------------------  Physical exam:  Constitutional:  Well-developed and well-nourished.     HENT:  Head:  Normocephalic and atraumatic.  Mouth:  Moist mucous  membranes.    Eyes:  Conjunctivae and EOM are normal.  Pupils are equal, round, and reactive to light.   Neck:  Neck supple.  No JVD present.    Cardiovascular:  Regular rate and rhythm. S1+S2. No murmur, rubs or gallops.   Pulmonary/Chest: Clear to auscultation bilaterally.   Abdominal:  Soft. Non-tender. No viscera palpable.  Bowel sounds audible.   Musculoskeletal:  Left above-the-knee amputation.    Peripheral vascular: Bilateral dorsalis pedis palpable. No edema.   Neurological:  Alert and oriented to person, place, and time.  Cranial nerves grossly intact. Strength bilaterally symmetrical.  Skin: small stage III ulcer on cocyx.  One deep stage IV ulcer is in right gluteal region measuring 4x4x6 cm with purulent drainage and  A deep stage IV ulcer in the left ischial area measuring [id number] with purulent discharge.   ----------------------------------------------------------------------------------------------------------------------  Tele:  Sinus rhythm  ----------------------------------------------------------------------------------------------------------------------    Results from last 7 days  Lab Units 12/06/17  0910 12/06/17  0351 12/05/17  2201 12/05/17  1217   CK TOTAL U/L  --   --  123 290*   CKMB ng/mL  --   --  3.16 6.22*   CK MB INDEX %  --   --  2.6 2.1   TROPONIN I ng/mL 0.018 0.022 0.020 0.036       Results from last 7 days  Lab Units 12/10/17  0121 12/09/17  0109 12/08/17  0004  12/05/17  1217   CRP mg/dL 9.07* 13.14* 16.53*  < >  --    LACTATE mmol/L  --   --   --   --  1.1   WBC 10*3/mm3 11.98 11.62 11.52  < > 20.81*   HEMOGLOBIN g/dL 9.8* 9.6* 8.9*  < > 8.6*   HEMATOCRIT % 33.3* 31.7* 29.4*  < > 30.4*   MCV fL 80.6 80.1 80.1  < > 79.8*   MCHC g/dL 29.4* 30.3* 30.3*  < > 28.3*   PLATELETS 10*3/mm3 641* 620* 601*  < > 775*   < > = values in this interval not displayed.    Results from last 7 days  Lab Units 12/05/17  2149   PH, ARTERIAL pH units 7.363   PO2 ART mm Hg 98.9   PCO2, ARTERIAL  mm Hg 28.8*   HCO3 ART mmol/L 16.0*       Results from last 7 days  Lab Units 12/10/17  0121 12/09/17  0109 12/08/17  0004 12/07/17  0122  12/06/17  0351  12/05/17  1217   SODIUM mmol/L 135 139 138 140  --  141  < > 140   POTASSIUM mmol/L 3.5 4.2 4.0 4.3  < > 3.3*  < > 3.9   MAGNESIUM mg/dL 1.8  --   --  2.2  --  1.6*  < >  --    CHLORIDE mmol/L 107 108 109 111  --  113*  < > 106   CO2 mmol/L 20.6* 21.8* 21.6* 20.6*  --  20.2*  < > 29.0   BUN mg/dL 12 14 13 13  --  15  < > 22*   CREATININE mg/dL 0.52 0.50 0.62 0.68  --  0.76  < > 1.52*   EGFR IF NONAFRICN AM mL/min/1.73 >150 >150 144 129  --  114  < > 51*   CALCIUM mg/dL 9.2 8.5 8.5 8.3  --  8.4  < > 9.9   GLUCOSE mg/dL 95 88 84 100  --  100  < > 95   ALBUMIN g/dL  --   --   --   --   --  3.00*  --  3.90   BILIRUBIN mg/dL  --   --   --   --   --  0.2  --  0.3   ALK PHOS U/L  --   --   --   --   --  93  --  123   AST (SGOT) U/L  --   --   --   --   --  24  --  33   ALT (SGPT) U/L  --   --   --   --   --  21  --  23   < > = values in this interval not displayed.Estimated Creatinine Clearance: 245.2 mL/min (by C-G formula based on Cr of 0.52).    No results found for: AMMONIA      Blood Culture   Date Value Ref Range Status   12/05/2017 No growth at less than 24 hours  Preliminary   12/05/2017 No growth at 24 hours  Preliminary   12/05/2017 No growth at 24 hours  Preliminary     Urine Culture   Date Value Ref Range Status   12/05/2017 Culture in progress  Preliminary     Wound Culture   Date Value Ref Range Status   12/05/2017 Culture in progress  Preliminary          I have personally looked at the labs and they are summarized above.  ----------------------------------------------------------------------------------------------------------------------  Imaging Results (last 24 hours)     ** No results found for the last 24 hours. **        ----------------------------------------------------------------------------------------------------------------------  Assessment  and Plan:    - Sepsis  Improving.  CRP Is improving and WBCs normalized.  Patient is afebrile and VS stable.  Due to infected decubitus ulcer and UTI.  Continue IV fluids and Vancomycin, aztreonam and Flagyl.   no growth on blood cultures so far.  Wound culture growing Escherichia coli and group B BHS.      - Infected decubitus ulcers/osteomyelitis  Continue broad-spectrum antibiotics as outlined above.  Continue wound care.  ID following and appreciate help from ID.  Discussed with Dr. Blair, patient needs pelvic debridement.  His infection is currently improving.  I will continue him on antibiotics and plan for referral to general surgery/orthopedic surgery as an outpatient for pelvic debridement.  Dr. Blair agreed with the plan.     - Altered mental status due to metabolic encephalopathy  Resolved..    Secondary to sepsis and infected decubitus ulcers.  CT head showed no acute intracranial abnormality.     - Acute urinary tract infection due to Klebsiella  Continue antibiotics as outlined above.       - Metabolic acidosis  Improving.   Secondary to sepsis and infection.  Continue IV fluids and monitor electrolytes     - Acute kidney injury  Resolved. Continue IV fluids and monitor renal function.     - Anemia  Microcytic and hypochromic.  Likely due to anemia of chronic disease given his recurrent infections.  We'll continue to monitor H&H and replace as needed.     - Prophylaxis  DVT: Heparin SQ  GI: Pepcid     - Fluids and nutrition  NS @ 100ml/hr.   Regular diet.      Pt is at high risk for decompensation due to sepsis, infected decubitus ulcer, acute urinary tract infection, metabolic acidosis, acute kidney injury.  Discussed plan of care with the patient understood and agreed with the plan.    Inocencia Montano MD  12/10/17  6:06 PM

## 2017-12-10 NOTE — PLAN OF CARE
Problem: Patient Care Overview (Adult)  Goal: Plan of Care Review  Outcome: Ongoing (interventions implemented as appropriate)    12/10/17 0146   Coping/Psychosocial Response Interventions   Plan Of Care Reviewed With patient   Patient Care Overview   Progress progress toward functional goals as expected       Goal: Adult Individualization and Mutuality  Outcome: Ongoing (interventions implemented as appropriate)    Problem: Fall Risk (Adult)  Goal: Identify Related Risk Factors and Signs and Symptoms  Outcome: Ongoing (interventions implemented as appropriate)    12/10/17 0146   Fall Risk   Fall Risk: Related Risk Factors bladder function altered;gait/mobility problems;fatigue/slow reaction;neuro disease/injury;environment unfamiliar   Fall Risk: Signs and Symptoms presence of risk factors       Goal: Absence of Falls  Outcome: Ongoing (interventions implemented as appropriate)    12/10/17 0146   Fall Risk (Adult)   Absence of Falls making progress toward outcome         Problem: Skin Integrity Impairment, Risk/Actual (Adult)  Goal: Identify Related Risk Factors and Signs and Symptoms  Outcome: Ongoing (interventions implemented as appropriate)    12/10/17 0146   Skin Integrity Impairment, Risk/Actual   Skin Integrity Impairment, Risk/Actual: Related Risk Factors edema;immobility   Signs and Symptoms (Skin Integrity Impairment) edema;erythema nonblanchable       Goal: Skin Integrity/Wound Healing  Outcome: Ongoing (interventions implemented as appropriate)    12/10/17 0146   Skin Integrity Impairment, Risk/Actual (Adult)   Skin Integrity/Wound Healing making progress toward outcome         Problem: Pressure Ulcer Risk (Alejandro Scale) (Adult,Obstetrics,Pediatric)  Goal: Identify Related Risk Factors and Signs and Symptoms  Outcome: Ongoing (interventions implemented as appropriate)    12/10/17 0146   Pressure Ulcer Risk (Alejandro Scale)   Related Risk Factors (Pressure Ulcer Risk (Alejandro Scale)) body weight  extremes;infection;mobility impaired       Goal: Skin Integrity  Outcome: Ongoing (interventions implemented as appropriate)    12/10/17 0146   Pressure Ulcer Risk (Alejandro Scale) (Adult,Obstetrics,Pediatric)   Skin Integrity making progress toward outcome         Problem: Infection, Risk/Actual (Adult)  Goal: Identify Related Risk Factors and Signs and Symptoms  Outcome: Ongoing (interventions implemented as appropriate)    12/07/17 1139 12/10/17 0146   Infection, Risk/Actual   Infection, Risk/Actual: Related Risk Factors prolonged hospitalization;poor personal hygiene;skin integrity impairment;treatment plan --    Signs and Symptoms (Infection, Risk/Actual) --  blood glucose changes;edema;weakness       Goal: Infection Prevention/Resolution  Outcome: Ongoing (interventions implemented as appropriate)    12/10/17 0146   Infection, Risk/Actual (Adult)   Infection Prevention/Resolution making progress toward outcome         Problem: Pressure Ulcer (Adult)  Goal: Signs and Symptoms of Listed Potential Problems Will be Absent or Manageable (Pressure Ulcer)  Outcome: Ongoing (interventions implemented as appropriate)    12/10/17 0146   Pressure Ulcer   Problems Assessed (Pressure Ulcer) all   Problems Present (Pressure Ulcer) wound progression/extension;infection         Problem: Self-Care Deficit (Adult,Obstetrics,Pediatric)  Goal: Identify Related Risk Factors and Signs and Symptoms  Outcome: Ongoing (interventions implemented as appropriate)    12/07/17 1139 12/10/17 0146   Self-Care Deficit   Self-Care Deficit: Related Risk Factors activity intolerance;disease process;immobility --    Signs and Symptoms (Self-Care Deficit) --  decreased functional activity tolerance;decreased range of motion;inability/unwillingness to perform self-care;unable to perform bathing/hygiene tasks;weakness, paresis, paralysis       Goal: Improved Ability to Perform BADL and IADL  Outcome: Ongoing (interventions implemented as  appropriate)    12/10/17 0146   Self-Care Deficit (Adult,Obstetrics,Pediatric)   Improved Ability to Perform BADL and IADL making progress toward outcome         Problem: Confusion, Acute (Adult)  Goal: Identify Related Risk Factors and Signs and Symptoms  Outcome: Ongoing (interventions implemented as appropriate)    12/07/17 1139 12/10/17 0146   Confusion, Acute   Related Risk Factors (Acute Confusion) infection;mobility decreased --    Signs and Symptoms (Acute Confusion) --  (none)       Goal: Cognitive/Functional Impairments Minimized  Outcome: Ongoing (interventions implemented as appropriate)    12/10/17 0146   Confusion, Acute (Adult)   Cognitive/Functional Impairments Minimized making progress toward outcome       Goal: Safety  Outcome: Ongoing (interventions implemented as appropriate)    12/10/17 0146   Confusion, Acute (Adult)   Safety making progress toward outcome         Problem: Colostomy (Adult)  Goal: Signs and Symptoms of Listed Potential Problems Will be Absent or Manageable (Colostomy)  Outcome: Ongoing (interventions implemented as appropriate)    12/10/17 0146   Colostomy   Problems Assessed (Colostomy) all   Problems Present (Colostomy) none

## 2017-12-10 NOTE — PROGRESS NOTES
"  I have personally seen and examined the patient today and discussed overnight interval progress and pertinent issues with nursing staff.    Subjective:    Patient more awake and alert.  No fever or chills.  Wound culture collected on 12/5/17 finalized with Escherichia coli, Streptococcus both susceptible to current antibiotic regimen with Azactam, Flagyl and vancomycin.  CRP significantly improved with normal white count.    History taken from: patient chart RN      Vital Signs    /73 (BP Location: Right arm, Patient Position: Lying)  Pulse 87  Temp 98.6 °F (37 °C) (Oral)   Resp 20  Ht 182.9 cm (72\")  Wt 113 kg (249 lb 11.2 oz)  SpO2 99%  BMI 33.87 kg/m2    Temp:  [98 °F (36.7 °C)-98.6 °F (37 °C)] 98.6 °F (37 °C)      Intake/Output Summary (Last 24 hours) at 12/10/17 0950  Last data filed at 12/10/17 0907   Gross per 24 hour   Intake             1920 ml   Output             2650 ml   Net             -730 ml     Intake & Output (last 3 days)       12/07 0701 - 12/08 0700 12/08 0701 - 12/09 0700 12/09 0701 - 12/10 0700 12/10 0701 - 12/11 0700    P.O. 1560 1440 1440 480    Blood        IV Piggyback  1500      Total Intake(mL/kg) 1560 (13.8) 2940 (26) 1440 (12.7) 480 (4.2)    Urine (mL/kg/hr) 2600 (1) 3000 (1.1) 2450 (0.9)     Stool 650 (0.2) 325 (0.1) 200 (0.1)     Total Output 3250 3325 2650      Net -1690 -385 -1210 +480            Unmeasured Urine Occurrence 1050 x       Unmeasured Stool Occurrence 1 x             Physical exam:    General Appearance:    Alert, cooperative, in no acute distress   Head:    Normocephalic, without obvious abnormality, atraumatic   Eyes:            Lids and lashes normal, conjunctivae and sclerae normal, no   icterus, no pallor, corneas clear, PERRLA   Ears:    Ears appear intact with no abnormalities noted   Throat:   No oral lesions, no thrush, oral mucosa moist   Neck:   No adenopathy, supple, trachea midline, no thyromegaly, no   carotid bruit, no JVD   Back:     No " tenderness to percussion or palpation, range of motion   normal   Lungs:     Clear to auscultation,respirations regular, even and unlabored. No wheezing, no ronchi and no crackles.    Heart:    Regular rhythm and normal rate, normal S1 and S2, no            murmur, no gallop, no rub, no click   Chest Wall:    No abnormalities observed   Abdomen:     Normal bowel sounds, no masses, no organomegaly, soft        non-tender, non-distended, no guarding, no rebound                tenderness   Rectal:     Deferred   Extremities:   Moves all extremities well, no edema, no cyanosis, no             redness   Pulses:   Pulses palpable and equal bilaterally   Skin:   No bleeding, bruising or rash   Lymph nodes:   No palpable adenopathy   Neurologic:   Awake, alert and oriented x 3. Following commands.           Results:      Results from last 7 days  Lab Units 12/10/17  0121 12/09/17  0109 12/08/17  0004 12/07/17  0122 12/06/17  0420 12/05/17  2201 12/05/17  1217   WBC 10*3/mm3 11.98 11.62 11.52 13.60* 17.08* 14.54* 20.81*     Lab Results   Component Value Date    NEUTROABS 8.19 (H) 12/10/2017         Results from last 7 days  Lab Units 12/10/17  0121   CREATININE mg/dL 0.52         Results from last 7 days  Lab Units 12/10/17  0121 12/09/17  0109 12/08/17  0004   CRP mg/dL 9.07* 13.14* 16.53*       Imaging Results (last 24 hours)     ** No results found for the last 24 hours. **            Results Review:    I have personally reviewed laboratory data, culture results, radiology studies and antimicrobial therapy.    Hospital Medications (active)       Dose Frequency Start End    acetaminophen (TYLENOL) tablet 650 mg 650 mg Every 6 Hours PRN 12/5/2017     Sig - Route: Take 2 tablets by mouth Every 6 (Six) Hours As Needed for Mild Pain  or Fever. - Oral    albuterol (PROVENTIL) nebulizer solution 0.083% 2.5 mg/3mL 2.5 mg Every 6 Hours PRN 12/6/2017     Sig - Route: Take 2.5 mg by nebulization Every 6 (Six) Hours As Needed for  "Wheezing or Shortness of Air. - Nebulization    ARIPiprazole (ABILIFY) tablet 10 mg 10 mg Nightly 12/6/2017     Sig - Route: Take 1 tablet by mouth Every Night. - Oral    aztreonam (AZACTAM) 2 g/100 mL 0.9% NS (mbp) 2 g Every 8 Hours 12/6/2017 12/16/2017    Sig - Route: Infuse 100 mL into a venous catheter Every 8 (Eight) Hours. - Intravenous    DULoxetine (CYMBALTA) DR capsule 60 mg 60 mg 2 Times Daily 12/6/2017     Sig - Route: Take 1 capsule by mouth 2 (Two) Times a Day. - Oral    famotidine (PEPCID) injection 20 mg 20 mg Every 12 Hours Scheduled 12/5/2017     Sig - Route: Infuse 2 mL into a venous catheter Every 12 (Twelve) Hours. - Intravenous    gabapentin (NEURONTIN) capsule 600 mg 600 mg Every 8 Hours Scheduled 12/6/2017     Sig - Route: Take 2 capsules by mouth Every 8 (Eight) Hours. - Oral    HYDROmorphone (DILAUDID) 1 MG/ML injection  - ADS Override Pull   12/6/2017 12/7/2017    Notes to Pharmacy: Created by cabinet override    ipratropium-albuterol (DUO-NEB) nebulizer solution 3 mL 3 mL 4 Times Daily - RT 12/5/2017     Sig - Route: Take 3 mL by nebulization 4 (Four) Times a Day. - Nebulization    Magnesium Sulfate 2 gram Bolus, followed by 8 gram infusion (total Mg dose 10 grams)- Mg less than or equal to 1mg/dL 2 g As Needed 12/6/2017     Sig - Route: Infuse 50 mL into a venous catheter As Needed (Mg less than or equal to 1mg/dL). - Intravenous    Cosign for Ordering: Accepted by Inocencia Montano MD on 12/6/2017  8:10 AM    Linked Group 1:  \"Or\" Linked Group Details        magnesium sulfate 4 gram infusion- Mg 1.6-1.9 mg/dL 4 g As Needed 12/6/2017     Sig - Route: Infuse 100 mL into a venous catheter As Needed (Mg 1.6-1.9 mg/dL). - Intravenous    Cosign for Ordering: Accepted by Inocencia Montano MD on 12/6/2017  8:10 AM    Linked Group 1:  \"Or\" Linked Group Details        magnesium sulfate 4 gram infusion- Mg 1.6-1.9 mg/dL 4 g Once 12/6/2017 12/6/2017    Sig - Route: Infuse 100 mL into a venous " "catheter 1 (One) Time. - Intravenous    Magnesium Sulfate 6 gram Infusion (2 gm x 3) -Mg 1.1 -1.5 mg/dL 2 g As Needed 12/6/2017     Sig - Route: Infuse 50 mL into a venous catheter As Needed (Mg 1.1 -1.5 mg/dL). - Intravenous    Cosign for Ordering: Accepted by Inocencia Montano MD on 12/6/2017  8:10 AM    Linked Group 1:  \"Or\" Linked Group Details        methenamine (HIPREX) tablet 1 g 1 g 2 Times Daily 12/6/2017     Sig - Route: Take 1 tablet by mouth 2 (Two) Times a Day. - Oral    metroNIDAZOLE (FLAGYL) IVPB 500 mg 500 mg Every 8 Hours 12/5/2017 12/15/2017    Sig - Route: Infuse 100 mL into a venous catheter Every 8 (Eight) Hours. - Intravenous    nitroglycerin (NITROSTAT) SL tablet 0.4 mg 0.4 mg Every 5 Minutes PRN 12/5/2017     Sig - Route: Place 1 tablet under the tongue Every 5 (Five) Minutes As Needed for Chest Pain (if systolic BP greater than 100 mm/Hg.). - Sublingual    Cosign for Ordering: Accepted by Inocencia Montano MD on 12/5/2017  6:13 PM    nystatin (MYCOSTATIN) powder 1 application 1 application 3 Times Daily PRN 12/6/2017     Sig - Route: Apply 1 application topically 3 (Three) Times a Day As Needed (skin). - Topical    pantoprazole (PROTONIX) EC tablet 40 mg 40 mg Every Morning 12/6/2017     Sig - Route: Take 1 tablet by mouth Every Morning. - Oral    potassium chloride (K-DUR,KLOR-CON) CR tablet 40 mEq 40 mEq Every 4 Hours 12/6/2017 12/6/2017    Sig - Route: Take 2 tablets by mouth Every 4 (Four) Hours. - Oral    potassium chloride (KLOR-CON) packet 40 mEq 40 mEq As Needed 12/6/2017     Sig - Route: Take 40 mEq by mouth As Needed (potassium replacement, see admin instructions). - Oral    Cosign for Ordering: Accepted by Inocencia Montano MD on 12/6/2017  8:10 AM    Linked Group 2:  \"Or\" Linked Group Details        potassium chloride (MICRO-K) CR capsule 40 mEq 40 mEq As Needed 12/6/2017     Sig - Route: Take 4 capsules by mouth As Needed (potassium replacement.  see admin instructions). - " "Oral    Cosign for Ordering: Accepted by Inocencia Montano MD on 12/6/2017  8:10 AM    Linked Group 2:  \"Or\" Linked Group Details        potassium chloride 10 mEq in 100 mL IVPB 10 mEq Every 1 Hour PRN 12/6/2017     Sig - Route: Infuse 100 mL into a venous catheter Every 1 (One) Hour As Needed (potassium protocol PERIPHERAL - see admin instructions). - Intravenous    Cosign for Ordering: Accepted by Inocencia Montano MD on 12/6/2017  8:10 AM    Linked Group 2:  \"Or\" Linked Group Details        sodium chloride 0.9 % flush 1-10 mL 1-10 mL As Needed 12/5/2017     Sig - Route: Infuse 1-10 mL into a venous catheter As Needed for Line Care. - Intravenous    Cosign for Ordering: Accepted by Inocencia Montano MD on 12/5/2017  6:13 PM    sodium chloride 0.9 % infusion 100 mL/hr Continuous 12/5/2017     Sig - Route: Infuse 100 mL/hr into a venous catheter Continuous. - Intravenous    sodium hypochlorite (DAKIN'S 1/4 STRENGTH) 0.125 % topical solution 0.125% solution  3 Times Daily 12/6/2017     Sig - Route: Apply  topically 3 (Three) Times a Day. - Topical    Cosign for Ordering: Accepted by Sung Blair MD on 12/6/2017  4:01 PM    sucralfate (CARAFATE) tablet 1 g 1 g 4 Times Daily Before Meals & Nightly 12/6/2017     Sig - Route: Take 1 tablet by mouth 4 (Four) Times a Day Before Meals & at Bedtime. - Oral    topiramate (TOPAMAX) tablet 100 mg 100 mg 3 Times Daily 12/6/2017     Sig - Route: Take 1 tablet by mouth 3 (Three) Times a Day. - Oral    vancomycin (VANCOCIN) 2,000 mg in sodium chloride 0.9 % 500 mL IVPB 2,000 mg Every 12 Hours 12/6/2017 12/20/2017    Sig - Route: Infuse 2,000 mg into a venous catheter Every 12 (Twelve) Hours. - Intravenous    zinc oxide 20 % ointment  3 Times Daily 12/6/2017     Sig - Route: Apply  topically 3 (Three) Times a Day. - Topical    Pharmacy to dose vancomycin (Discontinued)  Continuous PRN 12/5/2017 12/6/2017    Sig - Route: Continuous As Needed for Consult. - Does not " apply    Reason for Discontinue: Duplicate order    sodium hypochlorite (DAKIN'S 1/4 STRENGTH) 0.125 % topical solution 0.125% solution (Discontinued)  2 Times Daily 12/6/2017 12/6/2017    Sig - Route: Apply  topically 2 (Two) Times a Day. - Topical    Cosign for Ordering: Accepted by Sung Blair MD on 12/6/2017  4:01 PM    vancomycin (VANCOCIN) 2,000 mg in sodium chloride 0.9 % 500 mL IVPB (Discontinued) 2,000 mg Every 24 Hours 12/6/2017 12/6/2017    Sig - Route: Infuse 2,000 mg into a venous catheter Daily. - Intravenous            Cultures:    Blood Culture   Date Value Ref Range Status   12/05/2017 No growth at 24 hours  Preliminary   12/05/2017 No growth at 24 hours  Preliminary   12/05/2017 No growth at 24 hours  Preliminary     Urine Culture   Date Value Ref Range Status   12/05/2017 Culture in progress  Preliminary     Wound Culture   Date Value Ref Range Status   12/05/2017 (A)  Preliminary    Light growth (2+) Gram Negative Bacilli, Morphology consistent with Escherichia / Citrobacter           Assessment/Plan     ASSESSMENT:    1.  Severe sepsis with encephalopathy  2.  Aspiration pneumonia  3.  Decubitus ulcers    PLAN:    Patient more awake and alert.  No fever or chills.  Wound culture collected on 12/5/17 finalized with Escherichia coli, Streptococcus both susceptible to current antibiotic regimen with Azactam, Flagyl and vancomycin.  CRP significantly improved with normal white count.    Case discussed with Dr. Hernández,  as plan is to get outpatient referral for further pelvic debridement/evaluation or recommendation of surgical consult.      From ID standpoint patient seems stable with normal white count and significantly improving CRP level on broad-spectrum coverage with vancomycin, Azactam and Flagyl.    We will continue to follow closely.    Patient's findings and recommendations were discussed with patient, nursing staff, primary care team and consulting provider    Code Status: Full Code      Jessica Wade, APRN  12/10/17  9:50 AM

## 2017-12-10 NOTE — PLAN OF CARE
Problem: Patient Care Overview (Adult)  Goal: Adult Individualization and Mutuality  Outcome: Ongoing (interventions implemented as appropriate)  Goal: Discharge Needs Assessment  Outcome: Ongoing (interventions implemented as appropriate)    Problem: Fall Risk (Adult)  Goal: Identify Related Risk Factors and Signs and Symptoms  Outcome: Ongoing (interventions implemented as appropriate)  Goal: Absence of Falls  Outcome: Ongoing (interventions implemented as appropriate)    Problem: Skin Integrity Impairment, Risk/Actual (Adult)  Goal: Identify Related Risk Factors and Signs and Symptoms  Outcome: Ongoing (interventions implemented as appropriate)  Goal: Skin Integrity/Wound Healing  Outcome: Ongoing (interventions implemented as appropriate)    Problem: Pressure Ulcer Risk (Alejandro Scale) (Adult,Obstetrics,Pediatric)  Goal: Identify Related Risk Factors and Signs and Symptoms  Outcome: Ongoing (interventions implemented as appropriate)  Goal: Skin Integrity  Outcome: Ongoing (interventions implemented as appropriate)    Problem: Infection, Risk/Actual (Adult)  Goal: Identify Related Risk Factors and Signs and Symptoms  Outcome: Ongoing (interventions implemented as appropriate)  Goal: Infection Prevention/Resolution  Outcome: Ongoing (interventions implemented as appropriate)    Problem: Pressure Ulcer (Adult)  Goal: Signs and Symptoms of Listed Potential Problems Will be Absent or Manageable (Pressure Ulcer)  Outcome: Ongoing (interventions implemented as appropriate)    Problem: Self-Care Deficit (Adult,Obstetrics,Pediatric)  Goal: Identify Related Risk Factors and Signs and Symptoms  Outcome: Ongoing (interventions implemented as appropriate)  Goal: Improved Ability to Perform BADL and IADL  Outcome: Ongoing (interventions implemented as appropriate)    Problem: Confusion, Acute (Adult)  Goal: Identify Related Risk Factors and Signs and Symptoms  Outcome: Ongoing (interventions implemented as appropriate)  Goal:  Cognitive/Functional Impairments Minimized  Outcome: Ongoing (interventions implemented as appropriate)  Goal: Safety  Outcome: Ongoing (interventions implemented as appropriate)    Problem: Colostomy (Adult)  Goal: Signs and Symptoms of Listed Potential Problems Will be Absent or Manageable (Colostomy)  Outcome: Ongoing (interventions implemented as appropriate)

## 2017-12-10 NOTE — PLAN OF CARE
Problem: Patient Care Overview (Adult)  Goal: Adult Individualization and Mutuality  Outcome: Ongoing (interventions implemented as appropriate)  Goal: Discharge Needs Assessment  Outcome: Ongoing (interventions implemented as appropriate)    Problem: Fall Risk (Adult)  Goal: Identify Related Risk Factors and Signs and Symptoms  Outcome: Ongoing (interventions implemented as appropriate)  Goal: Absence of Falls  Outcome: Ongoing (interventions implemented as appropriate)    Problem: Skin Integrity Impairment, Risk/Actual (Adult)  Goal: Identify Related Risk Factors and Signs and Symptoms  Outcome: Ongoing (interventions implemented as appropriate)  Goal: Skin Integrity/Wound Healing  Outcome: Ongoing (interventions implemented as appropriate)    Problem: Pressure Ulcer Risk (Alejandro Scale) (Adult,Obstetrics,Pediatric)  Goal: Identify Related Risk Factors and Signs and Symptoms  Outcome: Ongoing (interventions implemented as appropriate)  Goal: Skin Integrity  Outcome: Ongoing (interventions implemented as appropriate)    Problem: Infection, Risk/Actual (Adult)  Goal: Identify Related Risk Factors and Signs and Symptoms  Outcome: Ongoing (interventions implemented as appropriate)  Goal: Infection Prevention/Resolution  Outcome: Ongoing (interventions implemented as appropriate)    Problem: Pressure Ulcer (Adult)  Goal: Signs and Symptoms of Listed Potential Problems Will be Absent or Manageable (Pressure Ulcer)  Outcome: Ongoing (interventions implemented as appropriate)    Problem: Self-Care Deficit (Adult,Obstetrics,Pediatric)  Goal: Identify Related Risk Factors and Signs and Symptoms  Outcome: Ongoing (interventions implemented as appropriate)

## 2017-12-11 LAB
ANION GAP SERPL CALCULATED.3IONS-SCNC: 9.9 MMOL/L (ref 3.6–11.2)
ANISOCYTOSIS BLD QL: NORMAL
BASOPHILS # BLD AUTO: 0.03 10*3/MM3 (ref 0–0.3)
BASOPHILS NFR BLD AUTO: 0.3 % (ref 0–2)
BUN BLD-MCNC: 12 MG/DL (ref 7–21)
BUN/CREAT SERPL: 20.7 (ref 7–25)
CALCIUM SPEC-SCNC: 8.7 MG/DL (ref 7.7–10)
CHLORIDE SERPL-SCNC: 107 MMOL/L (ref 99–112)
CO2 SERPL-SCNC: 19.1 MMOL/L (ref 24.3–31.9)
CREAT BLD-MCNC: 0.58 MG/DL (ref 0.43–1.29)
CRP SERPL-MCNC: 7.04 MG/DL (ref 0–0.99)
DEPRECATED RDW RBC AUTO: 53 FL (ref 37–54)
EOSINOPHIL # BLD AUTO: 0.33 10*3/MM3 (ref 0–0.7)
EOSINOPHIL NFR BLD AUTO: 3 % (ref 0–5)
ERYTHROCYTE [DISTWIDTH] IN BLOOD BY AUTOMATED COUNT: 18.5 % (ref 11.5–14.5)
GFR SERPL CREATININE-BSD FRML MDRD: >150 ML/MIN/1.73
GLUCOSE BLD-MCNC: 89 MG/DL (ref 70–110)
HCT VFR BLD AUTO: 32.5 % (ref 42–52)
HGB BLD-MCNC: 9.3 G/DL (ref 14–18)
HYPOCHROMIA BLD QL: NORMAL
IMM GRANULOCYTES # BLD: 0.12 10*3/MM3 (ref 0–0.03)
IMM GRANULOCYTES NFR BLD: 1.1 % (ref 0–0.5)
LYMPHOCYTES # BLD AUTO: 2.53 10*3/MM3 (ref 1–3)
LYMPHOCYTES NFR BLD AUTO: 22.9 % (ref 21–51)
MAGNESIUM SERPL-MCNC: 1.8 MG/DL (ref 1.7–2.6)
MCH RBC QN AUTO: 23.9 PG (ref 27–33)
MCHC RBC AUTO-ENTMCNC: 28.6 G/DL (ref 33–37)
MCV RBC AUTO: 83.5 FL (ref 80–94)
MONOCYTES # BLD AUTO: 0.49 10*3/MM3 (ref 0.1–0.9)
MONOCYTES NFR BLD AUTO: 4.4 % (ref 0–10)
NEUTROPHILS # BLD AUTO: 7.53 10*3/MM3 (ref 1.4–6.5)
NEUTROPHILS NFR BLD AUTO: 68.3 % (ref 30–70)
OSMOLALITY SERPL CALC.SUM OF ELEC: 271.2 MOSM/KG (ref 273–305)
PHOSPHATE SERPL-MCNC: 4.9 MG/DL (ref 2.7–4.5)
PLAT MORPH BLD: NORMAL
PLATELET # BLD AUTO: 537 10*3/MM3 (ref 130–400)
PMV BLD AUTO: 9.1 FL (ref 6–10)
POTASSIUM BLD-SCNC: 4.1 MMOL/L (ref 3.5–5.3)
RBC # BLD AUTO: 3.89 10*6/MM3 (ref 4.7–6.1)
SODIUM BLD-SCNC: 136 MMOL/L (ref 135–153)
WBC NRBC COR # BLD: 11.03 10*3/MM3 (ref 4.5–12.5)

## 2017-12-11 PROCEDURE — 84100 ASSAY OF PHOSPHORUS: CPT | Performed by: PHYSICIAN ASSISTANT

## 2017-12-11 PROCEDURE — 83735 ASSAY OF MAGNESIUM: CPT | Performed by: PHYSICIAN ASSISTANT

## 2017-12-11 PROCEDURE — 94799 UNLISTED PULMONARY SVC/PX: CPT

## 2017-12-11 PROCEDURE — 85025 COMPLETE CBC W/AUTO DIFF WBC: CPT | Performed by: INTERNAL MEDICINE

## 2017-12-11 PROCEDURE — 80048 BASIC METABOLIC PNL TOTAL CA: CPT | Performed by: PHYSICIAN ASSISTANT

## 2017-12-11 PROCEDURE — 99233 SBSQ HOSP IP/OBS HIGH 50: CPT | Performed by: INTERNAL MEDICINE

## 2017-12-11 PROCEDURE — 05HC33Z INSERTION OF INFUSION DEVICE INTO LEFT BASILIC VEIN, PERCUTANEOUS APPROACH: ICD-10-PCS | Performed by: SURGERY

## 2017-12-11 PROCEDURE — C1751 CATH, INF, PER/CENT/MIDLINE: HCPCS

## 2017-12-11 PROCEDURE — 25010000002 VANCOMYCIN PER 500 MG

## 2017-12-11 PROCEDURE — 86140 C-REACTIVE PROTEIN: CPT | Performed by: PHYSICIAN ASSISTANT

## 2017-12-11 PROCEDURE — 85007 BL SMEAR W/DIFF WBC COUNT: CPT | Performed by: INTERNAL MEDICINE

## 2017-12-11 RX ORDER — SODIUM CHLORIDE 0.9 % (FLUSH) 0.9 %
10 SYRINGE (ML) INJECTION EVERY 12 HOURS SCHEDULED
Status: DISCONTINUED | OUTPATIENT
Start: 2017-12-11 | End: 2017-12-18 | Stop reason: HOSPADM

## 2017-12-11 RX ORDER — SODIUM CHLORIDE 0.9 % (FLUSH) 0.9 %
10 SYRINGE (ML) INJECTION AS NEEDED
Status: DISCONTINUED | OUTPATIENT
Start: 2017-12-11 | End: 2017-12-18 | Stop reason: HOSPADM

## 2017-12-11 RX ADMIN — ZINC OXIDE: 200 OINTMENT TOPICAL at 15:00

## 2017-12-11 RX ADMIN — VANCOMYCIN HYDROCHLORIDE 2000 MG: 5 INJECTION, POWDER, LYOPHILIZED, FOR SOLUTION INTRAVENOUS at 12:08

## 2017-12-11 RX ADMIN — AZTREONAM 2 G: 2 INJECTION, POWDER, FOR SOLUTION INTRAMUSCULAR; INTRAVENOUS at 14:56

## 2017-12-11 RX ADMIN — SUCRALFATE 1 G: 1 TABLET ORAL at 08:52

## 2017-12-11 RX ADMIN — METRONIDAZOLE 500 MG: 500 INJECTION, SOLUTION INTRAVENOUS at 05:37

## 2017-12-11 RX ADMIN — TOPIRAMATE 100 MG: 100 TABLET, FILM COATED ORAL at 15:00

## 2017-12-11 RX ADMIN — SODIUM HYPOCHLORITE: 1.25 SOLUTION TOPICAL at 15:00

## 2017-12-11 RX ADMIN — METRONIDAZOLE 500 MG: 500 INJECTION, SOLUTION INTRAVENOUS at 14:56

## 2017-12-11 RX ADMIN — Medication 5 ML: at 08:53

## 2017-12-11 RX ADMIN — METRONIDAZOLE 500 MG: 500 INJECTION, SOLUTION INTRAVENOUS at 22:35

## 2017-12-11 RX ADMIN — METHENAMINE HIPPURATE 1 G: 1 TABLET ORAL at 08:52

## 2017-12-11 RX ADMIN — SODIUM HYPOCHLORITE: 1.25 SOLUTION TOPICAL at 22:36

## 2017-12-11 RX ADMIN — NYSTATIN 1 APPLICATION: 100000 POWDER TOPICAL at 22:36

## 2017-12-11 RX ADMIN — SUCRALFATE 1 G: 1 TABLET ORAL at 22:35

## 2017-12-11 RX ADMIN — DULOXETINE HYDROCHLORIDE 60 MG: 60 CAPSULE, DELAYED RELEASE ORAL at 08:52

## 2017-12-11 RX ADMIN — GABAPENTIN 600 MG: 300 CAPSULE ORAL at 22:46

## 2017-12-11 RX ADMIN — ZINC OXIDE: 200 OINTMENT TOPICAL at 22:36

## 2017-12-11 RX ADMIN — Medication 5 ML: at 17:22

## 2017-12-11 RX ADMIN — ARIPIPRAZOLE 10 MG: 10 TABLET ORAL at 22:35

## 2017-12-11 RX ADMIN — SUCRALFATE 1 G: 1 TABLET ORAL at 12:09

## 2017-12-11 RX ADMIN — SUCRALFATE 1 G: 1 TABLET ORAL at 17:22

## 2017-12-11 RX ADMIN — NYSTATIN 1 APPLICATION: 100000 POWDER TOPICAL at 08:52

## 2017-12-11 RX ADMIN — IPRATROPIUM BROMIDE AND ALBUTEROL SULFATE 3 ML: .5; 3 SOLUTION RESPIRATORY (INHALATION) at 19:02

## 2017-12-11 RX ADMIN — GABAPENTIN 600 MG: 300 CAPSULE ORAL at 05:37

## 2017-12-11 RX ADMIN — AZTREONAM 2 G: 2 INJECTION, POWDER, FOR SOLUTION INTRAMUSCULAR; INTRAVENOUS at 08:55

## 2017-12-11 RX ADMIN — METHENAMINE HIPPURATE 1 G: 1 TABLET ORAL at 17:22

## 2017-12-11 RX ADMIN — Medication 5 ML: at 22:36

## 2017-12-11 RX ADMIN — Medication 10 ML: at 12:09

## 2017-12-11 RX ADMIN — GABAPENTIN 600 MG: 300 CAPSULE ORAL at 14:55

## 2017-12-11 RX ADMIN — ZINC OXIDE: 200 OINTMENT TOPICAL at 08:52

## 2017-12-11 RX ADMIN — DULOXETINE HYDROCHLORIDE 60 MG: 60 CAPSULE, DELAYED RELEASE ORAL at 17:22

## 2017-12-11 RX ADMIN — AZTREONAM 2 G: 2 INJECTION, POWDER, FOR SOLUTION INTRAMUSCULAR; INTRAVENOUS at 22:42

## 2017-12-11 RX ADMIN — Medication 5 ML: at 12:08

## 2017-12-11 RX ADMIN — TOPIRAMATE 100 MG: 100 TABLET, FILM COATED ORAL at 08:52

## 2017-12-11 RX ADMIN — TOPIRAMATE 100 MG: 100 TABLET, FILM COATED ORAL at 22:35

## 2017-12-11 RX ADMIN — PANTOPRAZOLE SODIUM 40 MG: 40 TABLET, DELAYED RELEASE ORAL at 05:37

## 2017-12-11 NOTE — PROGRESS NOTES
Subjective     History:   Ken Krueger is a 42 y.o. male admitted on 12/5/2017 secondary to Sepsis     Procedures:   12/11/17: LUE Powerglide insertion    Patient seen and examined with SHAHNAZ Gomez. Awake and alert. Feels overall improved from upon admission and is anxious to go home. PICC removed today with concerns for erythema around site. No acute events overnight per RN.     History taken from: patient, chart, and RN.      Objective     Vital Signs  Temp:  [98.3 °F (36.8 °C)-99 °F (37.2 °C)] 98.5 °F (36.9 °C)  Heart Rate:  [] 84  Resp:  [18-21] 18  BP: (116-125)/(60-74) 120/74    Intake/Output Summary (Last 24 hours) at 12/11/17 1351  Last data filed at 12/11/17 0631   Gross per 24 hour   Intake             1470 ml   Output             3475 ml   Net            -2005 ml         Physical Exam:  General:    Awake, alert, in no acute distress   Heart:      Normal S1 and S2. Regular rate and rhythm. No significant murmur, rubs or gallops appreciated.   Lungs:     Respirations regular, even and unlabored. Lungs clear to auscultation B/L. No wheezes, rales or rhonchi. (+) ileostomy and colostomy    Abdomen:   Soft and nontender. No guarding, rebound tenderness or  organomegaly noted. Bowel sounds present x 4.   Extremities:  Left AKA. No edema in RLE.      Results Review:      Results from last 7 days  Lab Units 12/11/17  0029 12/10/17  0121 12/09/17  0109 12/08/17  0004 12/07/17  0122 12/06/17  2043 12/06/17  0420 12/05/17  2201   WBC 10*3/mm3 11.03 11.98 11.62 11.52 13.60*  --  17.08* 14.54*   HEMOGLOBIN g/dL 9.3* 9.8* 9.6* 8.9* 8.7* 9.4* 6.5* 7.4*   PLATELETS 10*3/mm3 537* 641* 620* 601* 632*  --  656* 643*       Results from last 7 days  Lab Units 12/11/17  0029 12/10/17  0121 12/09/17  0109 12/08/17  0004 12/07/17  0122 12/06/17  1537 12/06/17  0351 12/05/17  2201   SODIUM mmol/L 136 135 139 138 140  --  141 140   POTASSIUM mmol/L 4.1 3.5 4.2 4.0 4.3 4.2 3.3* 3.4*   CHLORIDE mmol/L 107 107 108 109 111  --  113*  112   CO2 mmol/L 19.1* 20.6* 21.8* 21.6* 20.6*  --  20.2* 18.6*   BUN mg/dL 12 12 14 13 13  --  15 19   CREATININE mg/dL 0.58 0.52 0.50 0.62 0.68  --  0.76 0.85   CALCIUM mg/dL 8.7 9.2 8.5 8.5 8.3  --  8.4 8.4   GLUCOSE mg/dL 89 95 88 84 100  --  100 89       Results from last 7 days  Lab Units 12/06/17  0351 12/05/17  1217   BILIRUBIN mg/dL 0.2 0.3   ALK PHOS U/L 93 123   AST (SGOT) U/L 24 33   ALT (SGPT) U/L 21 23       Results from last 7 days  Lab Units 12/11/17  0029 12/10/17  0121 12/07/17  0122 12/06/17  0351 12/05/17  1850   MAGNESIUM mg/dL 1.8 1.8 2.2 1.6* 1.6*           Results from last 7 days  Lab Units 12/06/17  0910 12/06/17  0351 12/05/17  2201 12/05/17  1217   CK TOTAL U/L  --   --  123 290*   TROPONIN I ng/mL 0.018 0.022 0.020 0.036   CK MB INDEX %  --   --  2.6 2.1       Imaging Results (last 24 hours)     ** No results found for the last 24 hours. **            Medications:    ARIPiprazole 10 mg Oral Nightly   aztreonam 2 g Intravenous Q8H   DULoxetine 60 mg Oral BID   gabapentin 600 mg Oral Q8H   methenamine 1 g Oral BID   metroNIDAZOLE 500 mg Intravenous Q8H   nystatin susp + lidocaine viscous 5 mL Swish & Swallow 4x Daily   pantoprazole 40 mg Oral QAM   sodium chloride 10 mL Intracatheter Q12H   sodium hypochlorite  Topical TID   sucralfate 1 g Oral 4x Daily AC & at Bedtime   topiramate 100 mg Oral TID   vancomycin 2,000 mg Intravenous Q12H   zinc oxide  Topical TID       sodium chloride 100 mL/hr Last Rate: 100 mL/hr (12/10/17 2120)           Assessment/Plan   Severe sepsis: Likely 2/2 infected decubitus ulcer and and UTI. Improving on broad spectrum IV antibiotics. Blood cultures with NGTD. Urine culture revealing Klebsiella.   Wound culture revealing E coli and Group B strep. Cont broad spectrum IV antibiotics per ID and repeat labs in the AM. ID input appreciated.     Decubitus ulcers/bilateral ischial wounds with underlying osteomyelitis: Cont broad spectrum IV antibiotics including Vanc,  Azactam and Flagyl. Discussed with ID and general surgery who recommend referral to tertiary care center for possible pelvic debridement. I spoke to hospitalist and orthopedic surgeon on call at [facility] and the hospitalist on call, Dr. Soto graciously agreed to accept the patient in transfer. Orthopedic surgery recommended plastic surgery evaluation upon admission to . I discussed with patient and his brother that he will be transferred to [facility] for further evaluation but he may not be a candidate for surgical intervention. Admissions coordinator at [facility] stated that it could be several days before a bed is available. Will cont current management for now. ID and surgery input appreciated.     Metabolic encephalopathy: Likely 2/2 above. Now resolved. Cont to monitor.     JAKE: Now resolved. Stop IVF's. Repeat labs in the AM.     Normocytic anemia: Cont to monitor. Repeat CBC in the AM.     Discussed with Johnnie Link and Brittany (at [facility]).     Disposition: Awaiting transfer to [facility].     Pt is at high risk 2/2 severe sepsis, UTI, decubitus ulcers, ischial wounds, osteomyelitis, encephalopathy and paraplegia.       Javier Moscoso,   12/11/17  1:51 PM

## 2017-12-11 NOTE — NURSING NOTE
Skin around PICC insertion site is red, blanchable and approximately 1 cm in diameter. Line from catheter to insertion site approximately 4 cm. Flushes well, blood return is present.  Arm scrubbed with chlorohexidine, line cleaned.   Patient denies pain or itching. Physician notified (Dr. Shafer). Consult to assess PICC for possible replacement.   0700: Felicia assessed PICC line, after consultation with Dr. Jain it was decided to remove PICC. Removed at 0745 by writer.

## 2017-12-11 NOTE — PROGRESS NOTES
"  I have personally seen and examined the patient today and discussed overnight interval progress and pertinent issues with nursing staff.    Subjective:    Patient is stable from ID standpoint with no fever or diarrhea with possible discharge or transfer.  Was noted to have very red right upper arm PICC site.  PICCto be removed in midline placement.    History taken from: patient chart RN      Vital Signs    /60 (BP Location: Left arm, Patient Position: Lying)  Pulse 85  Temp 98.3 °F (36.8 °C) (Oral)   Resp 20  Ht 182.9 cm (72\")  Wt 113 kg (249 lb 11.2 oz)  SpO2 98%  BMI 33.87 kg/m2    Temp:  [98.3 °F (36.8 °C)-99 °F (37.2 °C)] 98.3 °F (36.8 °C)      Intake/Output Summary (Last 24 hours) at 12/11/17 1034  Last data filed at 12/11/17 0631   Gross per 24 hour   Intake             1470 ml   Output             3475 ml   Net            -2005 ml     Intake & Output (last 3 days)       12/08 0701 - 12/09 0700 12/09 0701 - 12/10 0700 12/10 0701 - 12/11 0700 12/11 0701 - 12/12 0700    P.O. 1440 1440 1800     IV Piggyback 1500       Irrigation   150     Total Intake(mL/kg) 2940 (26) 1440 (12.7) 1950 (17.2)     Urine (mL/kg/hr) 3000 (1.1) 2450 (0.9) 3125 (1.1)     Stool 325 (0.1) 200 (0.1) 350 (0.1)     Total Output 3325 2650 3475      Net -385 1210 -3940                    Physical exam:    General Appearance:    Alert, cooperative, in no acute distress   Head:    Normocephalic, without obvious abnormality, atraumatic   Eyes:            Lids and lashes normal, conjunctivae and sclerae normal, no   icterus, no pallor, corneas clear, PERRLA   Ears:    Ears appear intact with no abnormalities noted   Throat:   No oral lesions, no thrush, oral mucosa moist   Neck:   No adenopathy, supple, trachea midline, no thyromegaly, no   carotid bruit, no JVD   Back:     No tenderness to percussion or palpation, range of motion   normal   Lungs:     Clear to auscultation,respirations regular, even and unlabored. No wheezing, " no ronchi and no crackles.    Heart:    Regular rhythm and normal rate, normal S1 and S2, no            murmur, no gallop, no rub, no click   Chest Wall:    No abnormalities observed   Abdomen:     Normal bowel sounds, no masses, no organomegaly, soft        non-tender, non-distended, no guarding, no rebound                tenderness   Rectal:     Deferred   Extremities:   Moves all extremities well, no edema, no cyanosis, no             redness   Pulses:   Pulses palpable and equal bilaterally   Skin:  Right upper arm PICC site red    Lymph nodes:   No palpable adenopathy   Neurologic:   Awake, alert and oriented x 3. Following commands.           Results:      Results from last 7 days  Lab Units 12/11/17  0029 12/10/17  0121 12/09/17  0109 12/08/17  0004 12/07/17  0122 12/06/17  0420 12/05/17  2201   WBC 10*3/mm3 11.03 11.98 11.62 11.52 13.60* 17.08* 14.54*     Lab Results   Component Value Date    NEUTROABS 7.53 (H) 12/11/2017         Results from last 7 days  Lab Units 12/11/17  0029   CREATININE mg/dL 0.58         Results from last 7 days  Lab Units 12/11/17  0029 12/10/17  0121 12/09/17  0109   CRP mg/dL 7.04* 9.07* 13.14*       Imaging Results (last 24 hours)     ** No results found for the last 24 hours. **            Results Review:    I have personally reviewed laboratory data, culture results, radiology studies and antimicrobial therapy.    Hospital Medications (active)       Dose Frequency Start End    acetaminophen (TYLENOL) tablet 650 mg 650 mg Every 6 Hours PRN 12/5/2017     Sig - Route: Take 2 tablets by mouth Every 6 (Six) Hours As Needed for Mild Pain  or Fever. - Oral    albuterol (PROVENTIL) nebulizer solution 0.083% 2.5 mg/3mL 2.5 mg Every 6 Hours PRN 12/6/2017     Sig - Route: Take 2.5 mg by nebulization Every 6 (Six) Hours As Needed for Wheezing or Shortness of Air. - Nebulization    ARIPiprazole (ABILIFY) tablet 10 mg 10 mg Nightly 12/6/2017     Sig - Route: Take 1 tablet by mouth Every  "Night. - Oral    aztreonam (AZACTAM) 2 g/100 mL 0.9% NS (mbp) 2 g Every 8 Hours 12/6/2017 12/16/2017    Sig - Route: Infuse 100 mL into a venous catheter Every 8 (Eight) Hours. - Intravenous    DULoxetine (CYMBALTA) DR capsule 60 mg 60 mg 2 Times Daily 12/6/2017     Sig - Route: Take 1 capsule by mouth 2 (Two) Times a Day. - Oral    famotidine (PEPCID) injection 20 mg 20 mg Every 12 Hours Scheduled 12/5/2017     Sig - Route: Infuse 2 mL into a venous catheter Every 12 (Twelve) Hours. - Intravenous    gabapentin (NEURONTIN) capsule 600 mg 600 mg Every 8 Hours Scheduled 12/6/2017     Sig - Route: Take 2 capsules by mouth Every 8 (Eight) Hours. - Oral    HYDROmorphone (DILAUDID) 1 MG/ML injection  - ADS Override Pull   12/6/2017 12/7/2017    Notes to Pharmacy: Created by cabinet override    ipratropium-albuterol (DUO-NEB) nebulizer solution 3 mL 3 mL 4 Times Daily - RT 12/5/2017     Sig - Route: Take 3 mL by nebulization 4 (Four) Times a Day. - Nebulization    Magnesium Sulfate 2 gram Bolus, followed by 8 gram infusion (total Mg dose 10 grams)- Mg less than or equal to 1mg/dL 2 g As Needed 12/6/2017     Sig - Route: Infuse 50 mL into a venous catheter As Needed (Mg less than or equal to 1mg/dL). - Intravenous    Cosign for Ordering: Accepted by Inocencia Montano MD on 12/6/2017  8:10 AM    Linked Group 1:  \"Or\" Linked Group Details        magnesium sulfate 4 gram infusion- Mg 1.6-1.9 mg/dL 4 g As Needed 12/6/2017     Sig - Route: Infuse 100 mL into a venous catheter As Needed (Mg 1.6-1.9 mg/dL). - Intravenous    Cosign for Ordering: Accepted by Inocencia Montano MD on 12/6/2017  8:10 AM    Linked Group 1:  \"Or\" Linked Group Details        magnesium sulfate 4 gram infusion- Mg 1.6-1.9 mg/dL 4 g Once 12/6/2017 12/6/2017    Sig - Route: Infuse 100 mL into a venous catheter 1 (One) Time. - Intravenous    Magnesium Sulfate 6 gram Infusion (2 gm x 3) -Mg 1.1 -1.5 mg/dL 2 g As Needed 12/6/2017     Sig - Route: Infuse 50 " "mL into a venous catheter As Needed (Mg 1.1 -1.5 mg/dL). - Intravenous    Cosign for Ordering: Accepted by Inocencia Montano MD on 12/6/2017  8:10 AM    Linked Group 1:  \"Or\" Linked Group Details        methenamine (HIPREX) tablet 1 g 1 g 2 Times Daily 12/6/2017     Sig - Route: Take 1 tablet by mouth 2 (Two) Times a Day. - Oral    metroNIDAZOLE (FLAGYL) IVPB 500 mg 500 mg Every 8 Hours 12/5/2017 12/15/2017    Sig - Route: Infuse 100 mL into a venous catheter Every 8 (Eight) Hours. - Intravenous    nitroglycerin (NITROSTAT) SL tablet 0.4 mg 0.4 mg Every 5 Minutes PRN 12/5/2017     Sig - Route: Place 1 tablet under the tongue Every 5 (Five) Minutes As Needed for Chest Pain (if systolic BP greater than 100 mm/Hg.). - Sublingual    Cosign for Ordering: Accepted by Inocencia Montano MD on 12/5/2017  6:13 PM    nystatin (MYCOSTATIN) powder 1 application 1 application 3 Times Daily PRN 12/6/2017     Sig - Route: Apply 1 application topically 3 (Three) Times a Day As Needed (skin). - Topical    pantoprazole (PROTONIX) EC tablet 40 mg 40 mg Every Morning 12/6/2017     Sig - Route: Take 1 tablet by mouth Every Morning. - Oral    potassium chloride (K-DUR,KLOR-CON) CR tablet 40 mEq 40 mEq Every 4 Hours 12/6/2017 12/6/2017    Sig - Route: Take 2 tablets by mouth Every 4 (Four) Hours. - Oral    potassium chloride (KLOR-CON) packet 40 mEq 40 mEq As Needed 12/6/2017     Sig - Route: Take 40 mEq by mouth As Needed (potassium replacement, see admin instructions). - Oral    Cosign for Ordering: Accepted by Inocencia Montano MD on 12/6/2017  8:10 AM    Linked Group 2:  \"Or\" Linked Group Details        potassium chloride (MICRO-K) CR capsule 40 mEq 40 mEq As Needed 12/6/2017     Sig - Route: Take 4 capsules by mouth As Needed (potassium replacement.  see admin instructions). - Oral    Cosign for Ordering: Accepted by Inocencia Montano MD on 12/6/2017  8:10 AM    Linked Group 2:  \"Or\" Linked Group Details        potassium " "chloride 10 mEq in 100 mL IVPB 10 mEq Every 1 Hour PRN 12/6/2017     Sig - Route: Infuse 100 mL into a venous catheter Every 1 (One) Hour As Needed (potassium protocol PERIPHERAL - see admin instructions). - Intravenous    Cosign for Ordering: Accepted by Inocencia Montano MD on 12/6/2017  8:10 AM    Linked Group 2:  \"Or\" Linked Group Details        sodium chloride 0.9 % flush 1-10 mL 1-10 mL As Needed 12/5/2017     Sig - Route: Infuse 1-10 mL into a venous catheter As Needed for Line Care. - Intravenous    Cosign for Ordering: Accepted by Inocencia Montano MD on 12/5/2017  6:13 PM    sodium chloride 0.9 % infusion 100 mL/hr Continuous 12/5/2017     Sig - Route: Infuse 100 mL/hr into a venous catheter Continuous. - Intravenous    sodium hypochlorite (DAKIN'S 1/4 STRENGTH) 0.125 % topical solution 0.125% solution  3 Times Daily 12/6/2017     Sig - Route: Apply  topically 3 (Three) Times a Day. - Topical    Cosign for Ordering: Accepted by Sung Blair MD on 12/6/2017  4:01 PM    sucralfate (CARAFATE) tablet 1 g 1 g 4 Times Daily Before Meals & Nightly 12/6/2017     Sig - Route: Take 1 tablet by mouth 4 (Four) Times a Day Before Meals & at Bedtime. - Oral    topiramate (TOPAMAX) tablet 100 mg 100 mg 3 Times Daily 12/6/2017     Sig - Route: Take 1 tablet by mouth 3 (Three) Times a Day. - Oral    vancomycin (VANCOCIN) 2,000 mg in sodium chloride 0.9 % 500 mL IVPB 2,000 mg Every 12 Hours 12/6/2017 12/20/2017    Sig - Route: Infuse 2,000 mg into a venous catheter Every 12 (Twelve) Hours. - Intravenous    zinc oxide 20 % ointment  3 Times Daily 12/6/2017     Sig - Route: Apply  topically 3 (Three) Times a Day. - Topical    Pharmacy to dose vancomycin (Discontinued)  Continuous PRN 12/5/2017 12/6/2017    Sig - Route: Continuous As Needed for Consult. - Does not apply    Reason for Discontinue: Duplicate order    sodium hypochlorite (DAKIN'S 1/4 STRENGTH) 0.125 % topical solution 0.125% solution (Discontinued)  2 " Times Daily 12/6/2017 12/6/2017    Sig - Route: Apply  topically 2 (Two) Times a Day. - Topical    Cosign for Ordering: Accepted by Sung Blair MD on 12/6/2017  4:01 PM    vancomycin (VANCOCIN) 2,000 mg in sodium chloride 0.9 % 500 mL IVPB (Discontinued) 2,000 mg Every 24 Hours 12/6/2017 12/6/2017    Sig - Route: Infuse 2,000 mg into a venous catheter Daily. - Intravenous            Cultures:    Blood Culture   Date Value Ref Range Status   12/05/2017 No growth at 24 hours  Preliminary   12/05/2017 No growth at 24 hours  Preliminary   12/05/2017 No growth at 24 hours  Preliminary     Urine Culture   Date Value Ref Range Status   12/05/2017 Culture in progress  Preliminary     Wound Culture   Date Value Ref Range Status   12/05/2017 (A)  Preliminary    Light growth (2+) Gram Negative Bacilli, Morphology consistent with Escherichia / Citrobacter           Assessment/Plan     ASSESSMENT:    1.  Severe sepsis with encephalopathy  2.  Aspiration pneumonia  3.  Decubitus ulcers    PLAN:    Patient is stable from ID standpoint with no fever or diarrhea with possible discharge or transfer.  Was noted to have very red right upper arm PICC site.  PICCto be removed in midline placement. CRP improving with normal white count.    On broad-spectrum coverage with vancomycin, Azactam and Flagyl.    We will continue to follow closely.    Patient's findings and recommendations were discussed with patient, nursing staff, primary care team and consulting provider    Code Status: Full Code     Scribed for Dr. Tra Wade, GUANACO  12/11/17  10:34 AM     Physician Attestation:    The documentation recorded by the scribe accurately reflects the service I personally performed and the decisions made by me.    Tra Jain MD  Infectious Diseases  12/12/17  7:52 AM

## 2017-12-11 NOTE — PROGRESS NOTES
Discharge Planning Assessment   [facility]     Patient Name: Ken Krueger  MRN: [id number]  Today's Date: 12/11/2017    Admit Date: 12/5/2017       Discharge Plan       12/11/17 1259    Case Management/Social Work Plan    Plan SS spoke to Dr. Moscoso who states plans to transfer pt to [facility] when bed is available. SS to follow.           Expected Discharge Date and Time     Expected Discharge Date Expected Discharge Time    Dec 13, 2017           Alycia Reed

## 2017-12-11 NOTE — PLAN OF CARE
Problem: Patient Care Overview (Adult)  Goal: Plan of Care Review  Outcome: Ongoing (interventions implemented as appropriate)    12/10/17 0146 12/10/17 2130   Coping/Psychosocial Response Interventions   Plan Of Care Reviewed With --  patient   Patient Care Overview   Progress progress toward functional goals as expected --        Goal: Adult Individualization and Mutuality  Outcome: Ongoing (interventions implemented as appropriate)    Problem: Fall Risk (Adult)  Goal: Identify Related Risk Factors and Signs and Symptoms  Outcome: Ongoing (interventions implemented as appropriate)    12/10/17 0146   Fall Risk   Fall Risk: Related Risk Factors bladder function altered;gait/mobility problems;fatigue/slow reaction;neuro disease/injury;environment unfamiliar   Fall Risk: Signs and Symptoms presence of risk factors       Goal: Absence of Falls  Outcome: Ongoing (interventions implemented as appropriate)    12/11/17 0054   Fall Risk (Adult)   Absence of Falls making progress toward outcome         Problem: Skin Integrity Impairment, Risk/Actual (Adult)  Goal: Identify Related Risk Factors and Signs and Symptoms  Outcome: Ongoing (interventions implemented as appropriate)    12/10/17 0146   Skin Integrity Impairment, Risk/Actual   Skin Integrity Impairment, Risk/Actual: Related Risk Factors edema;immobility   Signs and Symptoms (Skin Integrity Impairment) edema;erythema nonblanchable       Goal: Skin Integrity/Wound Healing  Outcome: Ongoing (interventions implemented as appropriate)    12/11/17 0054   Skin Integrity Impairment, Risk/Actual (Adult)   Skin Integrity/Wound Healing making progress toward outcome         Problem: Pressure Ulcer Risk (Alejandro Scale) (Adult,Obstetrics,Pediatric)  Goal: Identify Related Risk Factors and Signs and Symptoms  Outcome: Ongoing (interventions implemented as appropriate)    12/10/17 0146   Pressure Ulcer Risk (Alejandro Scale)   Related Risk Factors (Pressure Ulcer Risk (Alejandro Scale))  body weight extremes;infection;mobility impaired       Goal: Skin Integrity  Outcome: Ongoing (interventions implemented as appropriate)    12/11/17 0054   Pressure Ulcer Risk (Alejandro Scale) (Adult,Obstetrics,Pediatric)   Skin Integrity making progress toward outcome         Problem: Infection, Risk/Actual (Adult)  Goal: Identify Related Risk Factors and Signs and Symptoms  Outcome: Ongoing (interventions implemented as appropriate)    12/07/17 1139 12/10/17 0146   Infection, Risk/Actual   Infection, Risk/Actual: Related Risk Factors prolonged hospitalization;poor personal hygiene;skin integrity impairment;treatment plan --    Signs and Symptoms (Infection, Risk/Actual) --  blood glucose changes;edema;weakness       Goal: Infection Prevention/Resolution  Outcome: Ongoing (interventions implemented as appropriate)    12/11/17 0054   Infection, Risk/Actual (Adult)   Infection Prevention/Resolution making progress toward outcome         Problem: Pressure Ulcer (Adult)  Goal: Signs and Symptoms of Listed Potential Problems Will be Absent or Manageable (Pressure Ulcer)  Outcome: Ongoing (interventions implemented as appropriate)    12/10/17 0146 12/11/17 0054   Pressure Ulcer   Problems Assessed (Pressure Ulcer) --  all   Problems Present (Pressure Ulcer) wound progression/extension;infection --          Problem: Self-Care Deficit (Adult,Obstetrics,Pediatric)  Goal: Identify Related Risk Factors and Signs and Symptoms  Outcome: Ongoing (interventions implemented as appropriate)    12/07/17 1139 12/10/17 0146   Self-Care Deficit   Self-Care Deficit: Related Risk Factors activity intolerance;disease process;immobility --    Signs and Symptoms (Self-Care Deficit) --  decreased functional activity tolerance;decreased range of motion;inability/unwillingness to perform self-care;unable to perform bathing/hygiene tasks;weakness, paresis, paralysis       Goal: Improved Ability to Perform BADL and IADL  Outcome: Ongoing  (interventions implemented as appropriate)    12/11/17 0054   Self-Care Deficit (Adult,Obstetrics,Pediatric)   Improved Ability to Perform BADL and IADL making progress toward outcome         Problem: Confusion, Acute (Adult)  Goal: Identify Related Risk Factors and Signs and Symptoms  Outcome: Ongoing (interventions implemented as appropriate)    12/07/17 1139 12/10/17 0146   Confusion, Acute   Related Risk Factors (Acute Confusion) infection;mobility decreased --    Signs and Symptoms (Acute Confusion) --  (none)       Goal: Cognitive/Functional Impairments Minimized  Outcome: Ongoing (interventions implemented as appropriate)    12/11/17 0054   Confusion, Acute (Adult)   Cognitive/Functional Impairments Minimized making progress toward outcome       Goal: Safety  Outcome: Ongoing (interventions implemented as appropriate)    12/11/17 0054   Confusion, Acute (Adult)   Safety making progress toward outcome         Problem: Colostomy (Adult)  Goal: Signs and Symptoms of Listed Potential Problems Will be Absent or Manageable (Colostomy)  Outcome: Ongoing (interventions implemented as appropriate)    12/10/17 0146 12/11/17 0054   Colostomy   Problems Assessed (Colostomy) --  all   Problems Present (Colostomy) none --

## 2017-12-11 NOTE — PLAN OF CARE
Problem: Patient Care Overview (Adult)  Goal: Plan of Care Review  Outcome: Ongoing (interventions implemented as appropriate)  Goal: Adult Individualization and Mutuality  Outcome: Ongoing (interventions implemented as appropriate)    Problem: Fall Risk (Adult)  Goal: Identify Related Risk Factors and Signs and Symptoms  Outcome: Ongoing (interventions implemented as appropriate)    Problem: Pressure Ulcer Risk (Alejandro Scale) (Adult,Obstetrics,Pediatric)  Goal: Skin Integrity  Outcome: Ongoing (interventions implemented as appropriate)    Problem: Infection, Risk/Actual (Adult)  Goal: Identify Related Risk Factors and Signs and Symptoms  Outcome: Ongoing (interventions implemented as appropriate)

## 2017-12-12 LAB
ALBUMIN SERPL-MCNC: 3.5 G/DL (ref 3.5–5)
ALBUMIN/GLOB SERPL: 0.9 G/DL (ref 1.5–2.5)
ALP SERPL-CCNC: 74 U/L (ref 40–129)
ALT SERPL W P-5'-P-CCNC: 22 U/L (ref 10–44)
ANION GAP SERPL CALCULATED.3IONS-SCNC: 7.6 MMOL/L (ref 3.6–11.2)
AST SERPL-CCNC: 24 U/L (ref 10–34)
BASOPHILS # BLD AUTO: 0.04 10*3/MM3 (ref 0–0.3)
BASOPHILS NFR BLD AUTO: 0.3 % (ref 0–2)
BILIRUB SERPL-MCNC: 0.2 MG/DL (ref 0.2–1.8)
BUN BLD-MCNC: 10 MG/DL (ref 7–21)
BUN/CREAT SERPL: 13.9 (ref 7–25)
CALCIUM SPEC-SCNC: 9.1 MG/DL (ref 7.7–10)
CHLORIDE SERPL-SCNC: 108 MMOL/L (ref 99–112)
CO2 SERPL-SCNC: 19.4 MMOL/L (ref 24.3–31.9)
CREAT BLD-MCNC: 0.72 MG/DL (ref 0.43–1.29)
CRP SERPL-MCNC: 6.11 MG/DL (ref 0–0.99)
DEPRECATED RDW RBC AUTO: 52.8 FL (ref 37–54)
EOSINOPHIL # BLD AUTO: 0.28 10*3/MM3 (ref 0–0.7)
EOSINOPHIL NFR BLD AUTO: 2.1 % (ref 0–5)
ERYTHROCYTE [DISTWIDTH] IN BLOOD BY AUTOMATED COUNT: 18.7 % (ref 11.5–14.5)
GFR SERPL CREATININE-BSD FRML MDRD: 121 ML/MIN/1.73
GLOBULIN UR ELPH-MCNC: 4 GM/DL
GLUCOSE BLD-MCNC: 115 MG/DL (ref 70–110)
HCT VFR BLD AUTO: 33.5 % (ref 42–52)
HGB BLD-MCNC: 9.8 G/DL (ref 14–18)
IMM GRANULOCYTES # BLD: 0.08 10*3/MM3 (ref 0–0.03)
IMM GRANULOCYTES NFR BLD: 0.6 % (ref 0–0.5)
LYMPHOCYTES # BLD AUTO: 2.2 10*3/MM3 (ref 1–3)
LYMPHOCYTES NFR BLD AUTO: 16.2 % (ref 21–51)
MCH RBC QN AUTO: 24.1 PG (ref 27–33)
MCHC RBC AUTO-ENTMCNC: 29.3 G/DL (ref 33–37)
MCV RBC AUTO: 82.3 FL (ref 80–94)
MONOCYTES # BLD AUTO: 0.57 10*3/MM3 (ref 0.1–0.9)
MONOCYTES NFR BLD AUTO: 4.2 % (ref 0–10)
NEUTROPHILS # BLD AUTO: 10.44 10*3/MM3 (ref 1.4–6.5)
NEUTROPHILS NFR BLD AUTO: 76.6 % (ref 30–70)
OSMOLALITY SERPL CALC.SUM OF ELEC: 270.1 MOSM/KG (ref 273–305)
PLATELET # BLD AUTO: 598 10*3/MM3 (ref 130–400)
PMV BLD AUTO: 9 FL (ref 6–10)
POTASSIUM BLD-SCNC: 3.7 MMOL/L (ref 3.5–5.3)
PROT SERPL-MCNC: 7.5 G/DL (ref 6–8)
RBC # BLD AUTO: 4.07 10*6/MM3 (ref 4.7–6.1)
SODIUM BLD-SCNC: 135 MMOL/L (ref 135–153)
VANCOMYCIN TROUGH SERPL-MCNC: 14.7 MCG/ML (ref 5–15)
WBC NRBC COR # BLD: 13.61 10*3/MM3 (ref 4.5–12.5)

## 2017-12-12 PROCEDURE — 80202 ASSAY OF VANCOMYCIN: CPT

## 2017-12-12 PROCEDURE — 85025 COMPLETE CBC W/AUTO DIFF WBC: CPT | Performed by: INTERNAL MEDICINE

## 2017-12-12 PROCEDURE — 99232 SBSQ HOSP IP/OBS MODERATE 35: CPT | Performed by: INTERNAL MEDICINE

## 2017-12-12 PROCEDURE — 25010000002 VANCOMYCIN PER 500 MG

## 2017-12-12 PROCEDURE — 94799 UNLISTED PULMONARY SVC/PX: CPT

## 2017-12-12 PROCEDURE — 80053 COMPREHEN METABOLIC PANEL: CPT | Performed by: INTERNAL MEDICINE

## 2017-12-12 PROCEDURE — 86140 C-REACTIVE PROTEIN: CPT | Performed by: INTERNAL MEDICINE

## 2017-12-12 RX ADMIN — TOPIRAMATE 100 MG: 100 TABLET, FILM COATED ORAL at 08:46

## 2017-12-12 RX ADMIN — Medication 5 ML: at 08:47

## 2017-12-12 RX ADMIN — SODIUM HYPOCHLORITE: 1.25 SOLUTION TOPICAL at 15:36

## 2017-12-12 RX ADMIN — PANTOPRAZOLE SODIUM 40 MG: 40 TABLET, DELAYED RELEASE ORAL at 06:35

## 2017-12-12 RX ADMIN — SODIUM HYPOCHLORITE: 1.25 SOLUTION TOPICAL at 08:47

## 2017-12-12 RX ADMIN — SUCRALFATE 1 G: 1 TABLET ORAL at 06:35

## 2017-12-12 RX ADMIN — METRONIDAZOLE 500 MG: 500 INJECTION, SOLUTION INTRAVENOUS at 13:40

## 2017-12-12 RX ADMIN — SUCRALFATE 1 G: 1 TABLET ORAL at 17:21

## 2017-12-12 RX ADMIN — SODIUM HYPOCHLORITE: 1.25 SOLUTION TOPICAL at 21:33

## 2017-12-12 RX ADMIN — METRONIDAZOLE 500 MG: 500 INJECTION, SOLUTION INTRAVENOUS at 21:32

## 2017-12-12 RX ADMIN — Medication 10 ML: at 08:48

## 2017-12-12 RX ADMIN — METHENAMINE HIPPURATE 1 G: 1 TABLET ORAL at 17:21

## 2017-12-12 RX ADMIN — SUCRALFATE 1 G: 1 TABLET ORAL at 21:32

## 2017-12-12 RX ADMIN — GABAPENTIN 600 MG: 300 CAPSULE ORAL at 06:35

## 2017-12-12 RX ADMIN — AZTREONAM 2 G: 2 INJECTION, POWDER, FOR SOLUTION INTRAMUSCULAR; INTRAVENOUS at 22:11

## 2017-12-12 RX ADMIN — SUCRALFATE 1 G: 1 TABLET ORAL at 11:36

## 2017-12-12 RX ADMIN — Medication 5 ML: at 17:21

## 2017-12-12 RX ADMIN — VANCOMYCIN HYDROCHLORIDE 2000 MG: 5 INJECTION, POWDER, LYOPHILIZED, FOR SOLUTION INTRAVENOUS at 02:30

## 2017-12-12 RX ADMIN — Medication 10 ML: at 21:33

## 2017-12-12 RX ADMIN — ZINC OXIDE: 200 OINTMENT TOPICAL at 21:33

## 2017-12-12 RX ADMIN — ZINC OXIDE: 200 OINTMENT TOPICAL at 08:47

## 2017-12-12 RX ADMIN — NYSTATIN 1 APPLICATION: 100000 POWDER TOPICAL at 08:47

## 2017-12-12 RX ADMIN — Medication 5 ML: at 21:33

## 2017-12-12 RX ADMIN — TOPIRAMATE 100 MG: 100 TABLET, FILM COATED ORAL at 21:32

## 2017-12-12 RX ADMIN — VANCOMYCIN HYDROCHLORIDE 2000 MG: 5 INJECTION, POWDER, LYOPHILIZED, FOR SOLUTION INTRAVENOUS at 14:50

## 2017-12-12 RX ADMIN — GABAPENTIN 600 MG: 300 CAPSULE ORAL at 21:33

## 2017-12-12 RX ADMIN — TOPIRAMATE 100 MG: 100 TABLET, FILM COATED ORAL at 15:36

## 2017-12-12 RX ADMIN — GABAPENTIN 600 MG: 300 CAPSULE ORAL at 13:40

## 2017-12-12 RX ADMIN — METRONIDAZOLE 500 MG: 500 INJECTION, SOLUTION INTRAVENOUS at 05:30

## 2017-12-12 RX ADMIN — ARIPIPRAZOLE 10 MG: 10 TABLET ORAL at 21:32

## 2017-12-12 RX ADMIN — NYSTATIN 1 APPLICATION: 100000 POWDER TOPICAL at 21:33

## 2017-12-12 RX ADMIN — DULOXETINE HYDROCHLORIDE 60 MG: 60 CAPSULE, DELAYED RELEASE ORAL at 08:46

## 2017-12-12 RX ADMIN — ZINC OXIDE: 200 OINTMENT TOPICAL at 15:36

## 2017-12-12 RX ADMIN — AZTREONAM 2 G: 2 INJECTION, POWDER, FOR SOLUTION INTRAMUSCULAR; INTRAVENOUS at 14:50

## 2017-12-12 RX ADMIN — METHENAMINE HIPPURATE 1 G: 1 TABLET ORAL at 08:47

## 2017-12-12 RX ADMIN — DULOXETINE HYDROCHLORIDE 60 MG: 60 CAPSULE, DELAYED RELEASE ORAL at 17:21

## 2017-12-12 RX ADMIN — AZTREONAM 2 G: 2 INJECTION, POWDER, FOR SOLUTION INTRAMUSCULAR; INTRAVENOUS at 06:35

## 2017-12-12 RX ADMIN — Medication 5 ML: at 11:35

## 2017-12-12 NOTE — PLAN OF CARE
Problem: Patient Care Overview (Adult)  Goal: Adult Individualization and Mutuality  Outcome: Ongoing (interventions implemented as appropriate)    Problem: Fall Risk (Adult)  Goal: Identify Related Risk Factors and Signs and Symptoms  Outcome: Ongoing (interventions implemented as appropriate)  Goal: Absence of Falls  Outcome: Ongoing (interventions implemented as appropriate)    Problem: Skin Integrity Impairment, Risk/Actual (Adult)  Goal: Identify Related Risk Factors and Signs and Symptoms  Outcome: Ongoing (interventions implemented as appropriate)    Problem: Pressure Ulcer Risk (Alejandro Scale) (Adult,Obstetrics,Pediatric)  Goal: Identify Related Risk Factors and Signs and Symptoms  Outcome: Ongoing (interventions implemented as appropriate)    Problem: Infection, Risk/Actual (Adult)  Goal: Identify Related Risk Factors and Signs and Symptoms  Outcome: Ongoing (interventions implemented as appropriate)

## 2017-12-12 NOTE — PLAN OF CARE
Problem: Patient Care Overview (Adult)  Goal: Plan of Care Review  Outcome: Ongoing (interventions implemented as appropriate)    12/12/17 0443   Coping/Psychosocial Response Interventions   Plan Of Care Reviewed With patient   Patient Care Overview   Progress no change         Problem: Pressure Ulcer (Adult)  Goal: Signs and Symptoms of Listed Potential Problems Will be Absent or Manageable (Pressure Ulcer)  Outcome: Ongoing (interventions implemented as appropriate)    Problem: Colostomy (Adult)  Goal: Signs and Symptoms of Listed Potential Problems Will be Absent or Manageable (Colostomy)  Outcome: Ongoing (interventions implemented as appropriate)

## 2017-12-12 NOTE — PROGRESS NOTES
Subjective     History:   Ken Krueger is a 42 y.o. male admitted on 12/5/2017 secondary to Sepsis     Procedures:   12/11/17: LUE Powerglide insertion    Patient seen and examined with SHAHNAZ Hernandez. Awake and alert. Denies any current complaints. Remains agreeable for transfer to [facility]. He is nervous about what the surgeons there may recommend and becomes tearful during our encounter. I spent a lengthy time at bedside today answering patients questions and addressing his concerns to the best of my ability. No acute events overnight per RN.     History taken from: patient, chart, and RN.      Objective     Vital Signs  Temp:  [97.9 °F (36.6 °C)-98.7 °F (37.1 °C)] 98.1 °F (36.7 °C)  Heart Rate:  [73-97] 97  Resp:  [20-21] 20  BP: (113-173)/(58-96) 113/58    Intake/Output Summary (Last 24 hours) at 12/12/17 1510  Last data filed at 12/12/17 1312   Gross per 24 hour   Intake             2380 ml   Output             4475 ml   Net            -2095 ml         Physical Exam:  General:    Awake, alert, in no acute distress   Heart:      Normal S1 and S2. Regular rate and rhythm. No significant murmur, rubs or gallops appreciated.   Lungs:     Respirations regular, even and unlabored. Lungs clear to auscultation B/L. No wheezes, rales or rhonchi.     Abdomen:   Soft and nontender. No guarding, rebound tenderness or  organomegaly noted. Bowel sounds present x 4. (+) ileostomy and colostomy   Extremities:  Left AKA. No edema in RLE.      Results Review:      Results from last 7 days  Lab Units 12/12/17  0024 12/11/17  0029 12/10/17  0121 12/09/17  0109 12/08/17  0004 12/07/17  0122 12/06/17  2043 12/06/17  0420   WBC 10*3/mm3 13.61* 11.03 11.98 11.62 11.52 13.60*  --  17.08*   HEMOGLOBIN g/dL 9.8* 9.3* 9.8* 9.6* 8.9* 8.7* 9.4* 6.5*   PLATELETS 10*3/mm3 598* 537* 641* 620* 601* 632*  --  656*       Results from last 7 days  Lab Units 12/12/17  0024 12/11/17  0029 12/10/17  0121 12/09/17  0109 12/08/17  0004 12/07/17  0122  12/06/17  1537 12/06/17  0351   SODIUM mmol/L 135 136 135 139 138 140  --  141   POTASSIUM mmol/L 3.7 4.1 3.5 4.2 4.0 4.3 4.2 3.3*   CHLORIDE mmol/L 108 107 107 108 109 111  --  113*   CO2 mmol/L 19.4* 19.1* 20.6* 21.8* 21.6* 20.6*  --  20.2*   BUN mg/dL 10 12 12 14 13 13  --  15   CREATININE mg/dL 0.72 0.58 0.52 0.50 0.62 0.68  --  0.76   CALCIUM mg/dL 9.1 8.7 9.2 8.5 8.5 8.3  --  8.4   GLUCOSE mg/dL 115* 89 95 88 84 100  --  100       Results from last 7 days  Lab Units 12/12/17  0024 12/06/17  0351   BILIRUBIN mg/dL 0.2 0.2   ALK PHOS U/L 74 93   AST (SGOT) U/L 24 24   ALT (SGPT) U/L 22 21       Results from last 7 days  Lab Units 12/11/17  0029 12/10/17  0121 12/07/17  0122 12/06/17  0351 12/05/17  1850   MAGNESIUM mg/dL 1.8 1.8 2.2 1.6* 1.6*           Results from last 7 days  Lab Units 12/06/17  0910 12/06/17  0351 12/05/17  2201   CK TOTAL U/L  --   --  123   TROPONIN I ng/mL 0.018 0.022 0.020   CK MB INDEX %  --   --  2.6       Imaging Results (last 24 hours)     ** No results found for the last 24 hours. **            Medications:    ARIPiprazole 10 mg Oral Nightly   aztreonam 2 g Intravenous Q8H   DULoxetine 60 mg Oral BID   gabapentin 600 mg Oral Q8H   methenamine 1 g Oral BID   metroNIDAZOLE 500 mg Intravenous Q8H   nystatin susp + lidocaine viscous 5 mL Swish & Swallow 4x Daily   pantoprazole 40 mg Oral QAM   sodium chloride 10 mL Intracatheter Q12H   sodium hypochlorite  Topical TID   sucralfate 1 g Oral 4x Daily AC & at Bedtime   topiramate 100 mg Oral TID   vancomycin 2,000 mg Intravenous Q12H   zinc oxide  Topical TID              Assessment/Plan   Severe sepsis: Likely 2/2 infected decubitus ulcer and and UTI. Improving on broad spectrum IV antibiotics. Blood cultures with NGTD. Urine culture revealing Klebsiella.   Wound culture revealing E coli and Group B strep. Cont broad spectrum IV antibiotics per ID and repeat labs in the AM. ID input appreciated.     Decubitus ulcers/bilateral ischial  wounds with underlying osteomyelitis: Cont broad spectrum IV antibiotics including Vanc, Azactam and Flagyl. Discussed with ID and general surgery who recommended referral to tertiary care center for possible pelvic debridement. I spoke to hospitalist and orthopedic surgeon on call at [facility] on 12/11/17 and the hospitalist on call, Dr. Soto graciously agreed to accept the patient in transfer. Orthopedic surgery recommended plastic surgery evaluation upon admission to . I discussed with patient and his brother that he will be transferred to [facility] for further evaluation but he may not be a candidate for surgical intervention. Admissions coordinator at [facility] stated that it could be several days before a bed is available. Will cont current management for now. ID and surgery input appreciated.     Metabolic encephalopathy: Likely 2/2 above. Now resolved. Cont to monitor.     JAKE: Now resolved. Stopped IVF's. Repeat labs in the AM.     Normocytic anemia: Stable. Cont to monitor. Repeat CBC in the AM.     Disposition: Awaiting transfer to [facility].     Pt is at high risk 2/2 severe sepsis, UTI, decubitus ulcers, ischial wounds, osteomyelitis, encephalopathy and paraplegia.       Javier Moscoso, DO  12/12/17  3:10 PM

## 2017-12-12 NOTE — PROGRESS NOTES
"  I have personally seen and examined the patient today and discussed overnight interval progress and pertinent issues with nursing staff.    Subjective:    Patient resting in no acute distress noted.  Improving CRP.  No fever reported overnight.    History taken from: patient chart RN      Vital Signs    /63 (BP Location: Right arm, Patient Position: Lying)  Pulse 90  Temp 97.9 °F (36.6 °C) (Oral)   Resp 20  Ht 182.9 cm (72\")  Wt 113 kg (249 lb 11.2 oz)  SpO2 95%  BMI 33.87 kg/m2    Temp:  [97.9 °F (36.6 °C)-98.7 °F (37.1 °C)] 97.9 °F (36.6 °C)      Intake/Output Summary (Last 24 hours) at 12/12/17 1219  Last data filed at 12/12/17 1143   Gross per 24 hour   Intake             2260 ml   Output             4200 ml   Net            -1940 ml     Intake & Output (last 3 days)       12/09 0701 - 12/10 0700 12/10 0701 - 12/11 0700 12/11 0701 - 12/12 0700 12/12 0701 - 12/13 0700    P.O. 1440 1800 1560 360    IV Piggyback   700     Irrigation  150      Total Intake(mL/kg) 1440 (12.7) 1950 (17.2) 2260 (20) 360 (3.2)    Urine (mL/kg/hr) 2450 (0.9) 3125 (1.1) 2350 (0.9) 750 (1.2)    Stool 200 (0.1) 350 (0.1) 800 (0.3) 300 (0.5)    Total Output 2650 3475 3150 1050    Net -1210 -1525 -890 -690                  Physical exam:    General Appearance:    Alert, cooperative, in no acute distress   Head:    Normocephalic, without obvious abnormality, atraumatic   Eyes:            Lids and lashes normal, conjunctivae and sclerae normal, no   icterus, no pallor, corneas clear, PERRLA   Ears:    Ears appear intact with no abnormalities noted   Throat:   No oral lesions, no thrush, oral mucosa moist   Neck:   No adenopathy, supple, trachea midline, no thyromegaly, no   carotid bruit, no JVD   Back:     No tenderness to percussion or palpation, range of motion   normal   Lungs:     Clear to auscultation,respirations regular, even and unlabored. No wheezing, no ronchi and no crackles.    Heart:    Regular rhythm and normal " rate, normal S1 and S2, no            murmur, no gallop, no rub, no click   Chest Wall:    No abnormalities observed   Abdomen:     Normal bowel sounds, no masses, no organomegaly, soft        non-tender, non-distended, no guarding, no rebound                tenderness   Rectal:     Deferred   Extremities:   Moves all extremities well, no edema, no cyanosis, no             redness   Pulses:   Pulses palpable and equal bilaterally   Skin:  Right upper arm PICC site red    Lymph nodes:   No palpable adenopathy   Neurologic:   Awake, alert and oriented x 3. Following commands.           Results:      Results from last 7 days  Lab Units 12/12/17  0024 12/11/17  0029 12/10/17  0121 12/09/17  0109 12/08/17  0004 12/07/17  0122 12/06/17  0420   WBC 10*3/mm3 13.61* 11.03 11.98 11.62 11.52 13.60* 17.08*     Lab Results   Component Value Date    NEUTROABS 10.44 (H) 12/12/2017         Results from last 7 days  Lab Units 12/12/17  0024   CREATININE mg/dL 0.72         Results from last 7 days  Lab Units 12/12/17  0024 12/11/17  0029 12/10/17  0121   CRP mg/dL 6.11* 7.04* 9.07*       Imaging Results (last 24 hours)     ** No results found for the last 24 hours. **            Results Review:    I have personally reviewed laboratory data, culture results, radiology studies and antimicrobial therapy.    Hospital Medications (active)       Dose Frequency Start End    acetaminophen (TYLENOL) tablet 650 mg 650 mg Every 6 Hours PRN 12/5/2017     Sig - Route: Take 2 tablets by mouth Every 6 (Six) Hours As Needed for Mild Pain  or Fever. - Oral    albuterol (PROVENTIL) nebulizer solution 0.083% 2.5 mg/3mL 2.5 mg Every 6 Hours PRN 12/6/2017     Sig - Route: Take 2.5 mg by nebulization Every 6 (Six) Hours As Needed for Wheezing or Shortness of Air. - Nebulization    ARIPiprazole (ABILIFY) tablet 10 mg 10 mg Nightly 12/6/2017     Sig - Route: Take 1 tablet by mouth Every Night. - Oral    aztreonam (AZACTAM) 2 g/100 mL 0.9% NS (mbp) 2 g Every  "8 Hours 12/6/2017 12/16/2017    Sig - Route: Infuse 100 mL into a venous catheter Every 8 (Eight) Hours. - Intravenous    DULoxetine (CYMBALTA) DR capsule 60 mg 60 mg 2 Times Daily 12/6/2017     Sig - Route: Take 1 capsule by mouth 2 (Two) Times a Day. - Oral    famotidine (PEPCID) injection 20 mg 20 mg Every 12 Hours Scheduled 12/5/2017     Sig - Route: Infuse 2 mL into a venous catheter Every 12 (Twelve) Hours. - Intravenous    gabapentin (NEURONTIN) capsule 600 mg 600 mg Every 8 Hours Scheduled 12/6/2017     Sig - Route: Take 2 capsules by mouth Every 8 (Eight) Hours. - Oral    HYDROmorphone (DILAUDID) 1 MG/ML injection  - ADS Override Pull   12/6/2017 12/7/2017    Notes to Pharmacy: Created by cabinet override    ipratropium-albuterol (DUO-NEB) nebulizer solution 3 mL 3 mL 4 Times Daily - RT 12/5/2017     Sig - Route: Take 3 mL by nebulization 4 (Four) Times a Day. - Nebulization    Magnesium Sulfate 2 gram Bolus, followed by 8 gram infusion (total Mg dose 10 grams)- Mg less than or equal to 1mg/dL 2 g As Needed 12/6/2017     Sig - Route: Infuse 50 mL into a venous catheter As Needed (Mg less than or equal to 1mg/dL). - Intravenous    Cosign for Ordering: Accepted by Inocencia Montano MD on 12/6/2017  8:10 AM    Linked Group 1:  \"Or\" Linked Group Details        magnesium sulfate 4 gram infusion- Mg 1.6-1.9 mg/dL 4 g As Needed 12/6/2017     Sig - Route: Infuse 100 mL into a venous catheter As Needed (Mg 1.6-1.9 mg/dL). - Intravenous    Cosign for Ordering: Accepted by Inocencia Montano MD on 12/6/2017  8:10 AM    Linked Group 1:  \"Or\" Linked Group Details        magnesium sulfate 4 gram infusion- Mg 1.6-1.9 mg/dL 4 g Once 12/6/2017 12/6/2017    Sig - Route: Infuse 100 mL into a venous catheter 1 (One) Time. - Intravenous    Magnesium Sulfate 6 gram Infusion (2 gm x 3) -Mg 1.1 -1.5 mg/dL 2 g As Needed 12/6/2017     Sig - Route: Infuse 50 mL into a venous catheter As Needed (Mg 1.1 -1.5 mg/dL). - Intravenous    " "Cosign for Ordering: Accepted by Inocencia Montano MD on 12/6/2017  8:10 AM    Linked Group 1:  \"Or\" Linked Group Details        methenamine (HIPREX) tablet 1 g 1 g 2 Times Daily 12/6/2017     Sig - Route: Take 1 tablet by mouth 2 (Two) Times a Day. - Oral    metroNIDAZOLE (FLAGYL) IVPB 500 mg 500 mg Every 8 Hours 12/5/2017 12/15/2017    Sig - Route: Infuse 100 mL into a venous catheter Every 8 (Eight) Hours. - Intravenous    nitroglycerin (NITROSTAT) SL tablet 0.4 mg 0.4 mg Every 5 Minutes PRN 12/5/2017     Sig - Route: Place 1 tablet under the tongue Every 5 (Five) Minutes As Needed for Chest Pain (if systolic BP greater than 100 mm/Hg.). - Sublingual    Cosign for Ordering: Accepted by Inocencia Montano MD on 12/5/2017  6:13 PM    nystatin (MYCOSTATIN) powder 1 application 1 application 3 Times Daily PRN 12/6/2017     Sig - Route: Apply 1 application topically 3 (Three) Times a Day As Needed (skin). - Topical    pantoprazole (PROTONIX) EC tablet 40 mg 40 mg Every Morning 12/6/2017     Sig - Route: Take 1 tablet by mouth Every Morning. - Oral    potassium chloride (K-DUR,KLOR-CON) CR tablet 40 mEq 40 mEq Every 4 Hours 12/6/2017 12/6/2017    Sig - Route: Take 2 tablets by mouth Every 4 (Four) Hours. - Oral    potassium chloride (KLOR-CON) packet 40 mEq 40 mEq As Needed 12/6/2017     Sig - Route: Take 40 mEq by mouth As Needed (potassium replacement, see admin instructions). - Oral    Cosign for Ordering: Accepted by Inocencia Montano MD on 12/6/2017  8:10 AM    Linked Group 2:  \"Or\" Linked Group Details        potassium chloride (MICRO-K) CR capsule 40 mEq 40 mEq As Needed 12/6/2017     Sig - Route: Take 4 capsules by mouth As Needed (potassium replacement.  see admin instructions). - Oral    Cosign for Ordering: Accepted by Inocencia Montano MD on 12/6/2017  8:10 AM    Linked Group 2:  \"Or\" Linked Group Details        potassium chloride 10 mEq in 100 mL IVPB 10 mEq Every 1 Hour PRN 12/6/2017     Sig - " "Route: Infuse 100 mL into a venous catheter Every 1 (One) Hour As Needed (potassium protocol PERIPHERAL - see admin instructions). - Intravenous    Cosign for Ordering: Accepted by Inocencia Montano MD on 12/6/2017  8:10 AM    Linked Group 2:  \"Or\" Linked Group Details        sodium chloride 0.9 % flush 1-10 mL 1-10 mL As Needed 12/5/2017     Sig - Route: Infuse 1-10 mL into a venous catheter As Needed for Line Care. - Intravenous    Cosign for Ordering: Accepted by Inocencia Montano MD on 12/5/2017  6:13 PM    sodium chloride 0.9 % infusion 100 mL/hr Continuous 12/5/2017     Sig - Route: Infuse 100 mL/hr into a venous catheter Continuous. - Intravenous    sodium hypochlorite (DAKIN'S 1/4 STRENGTH) 0.125 % topical solution 0.125% solution  3 Times Daily 12/6/2017     Sig - Route: Apply  topically 3 (Three) Times a Day. - Topical    Cosign for Ordering: Accepted by Sung Blair MD on 12/6/2017  4:01 PM    sucralfate (CARAFATE) tablet 1 g 1 g 4 Times Daily Before Meals & Nightly 12/6/2017     Sig - Route: Take 1 tablet by mouth 4 (Four) Times a Day Before Meals & at Bedtime. - Oral    topiramate (TOPAMAX) tablet 100 mg 100 mg 3 Times Daily 12/6/2017     Sig - Route: Take 1 tablet by mouth 3 (Three) Times a Day. - Oral    vancomycin (VANCOCIN) 2,000 mg in sodium chloride 0.9 % 500 mL IVPB 2,000 mg Every 12 Hours 12/6/2017 12/20/2017    Sig - Route: Infuse 2,000 mg into a venous catheter Every 12 (Twelve) Hours. - Intravenous    zinc oxide 20 % ointment  3 Times Daily 12/6/2017     Sig - Route: Apply  topically 3 (Three) Times a Day. - Topical    Pharmacy to dose vancomycin (Discontinued)  Continuous PRN 12/5/2017 12/6/2017    Sig - Route: Continuous As Needed for Consult. - Does not apply    Reason for Discontinue: Duplicate order    sodium hypochlorite (DAKIN'S 1/4 STRENGTH) 0.125 % topical solution 0.125% solution (Discontinued)  2 Times Daily 12/6/2017 12/6/2017    Sig - Route: Apply  topically 2 (Two) " Times a Day. - Topical    Cosign for Ordering: Accepted by Sung Blair MD on 12/6/2017  4:01 PM    vancomycin (VANCOCIN) 2,000 mg in sodium chloride 0.9 % 500 mL IVPB (Discontinued) 2,000 mg Every 24 Hours 12/6/2017 12/6/2017    Sig - Route: Infuse 2,000 mg into a venous catheter Daily. - Intravenous            Cultures:    Blood Culture   Date Value Ref Range Status   12/05/2017 No growth at 24 hours  Preliminary   12/05/2017 No growth at 24 hours  Preliminary   12/05/2017 No growth at 24 hours  Preliminary     Urine Culture   Date Value Ref Range Status   12/05/2017 Culture in progress  Preliminary     Wound Culture   Date Value Ref Range Status   12/05/2017 (A)  Preliminary    Light growth (2+) Gram Negative Bacilli, Morphology consistent with Escherichia / Citrobacter           Assessment/Plan     ASSESSMENT:    1.  Severe sepsis with encephalopathy  2.  Aspiration pneumonia  3.  Decubitus ulcers    PLAN:    Patient resting in no acute distress noted.  Improving CRP.  No fever reported overnight.    On broad-spectrum coverage with vancomycin, Azactam and Flagyl.    We will continue to follow closely.    Patient's findings and recommendations were discussed with patient, nursing staff, primary care team and consulting provider    Code Status: Full Code     GUANACO Lawrence  12/12/17  12:19 PM     Physician Attestation:    The documentation recorded by the scribe accurately reflects the service I personally performed and the decisions made by me.    Tra Jain MD  Infectious Diseases  12/12/17  12:19 PM

## 2017-12-13 LAB
ANION GAP SERPL CALCULATED.3IONS-SCNC: 8.9 MMOL/L (ref 3.6–11.2)
BASOPHILS # BLD AUTO: 0.02 10*3/MM3 (ref 0–0.3)
BASOPHILS NFR BLD AUTO: 0.2 % (ref 0–2)
BUN BLD-MCNC: 10 MG/DL (ref 7–21)
BUN/CREAT SERPL: 16.9 (ref 7–25)
CALCIUM SPEC-SCNC: 8.6 MG/DL (ref 7.7–10)
CHLORIDE SERPL-SCNC: 108 MMOL/L (ref 99–112)
CO2 SERPL-SCNC: 19.1 MMOL/L (ref 24.3–31.9)
CREAT BLD-MCNC: 0.59 MG/DL (ref 0.43–1.29)
CRP SERPL-MCNC: 6.77 MG/DL (ref 0–0.99)
DEPRECATED RDW RBC AUTO: 53.1 FL (ref 37–54)
EOSINOPHIL # BLD AUTO: 0.23 10*3/MM3 (ref 0–0.7)
EOSINOPHIL NFR BLD AUTO: 2.5 % (ref 0–5)
ERYTHROCYTE [DISTWIDTH] IN BLOOD BY AUTOMATED COUNT: 18.9 % (ref 11.5–14.5)
GFR SERPL CREATININE-BSD FRML MDRD: >150 ML/MIN/1.73
GLUCOSE BLD-MCNC: 93 MG/DL (ref 70–110)
HCT VFR BLD AUTO: 31.3 % (ref 42–52)
HGB BLD-MCNC: 9.3 G/DL (ref 14–18)
IMM GRANULOCYTES # BLD: 0.04 10*3/MM3 (ref 0–0.03)
IMM GRANULOCYTES NFR BLD: 0.4 % (ref 0–0.5)
LYMPHOCYTES # BLD AUTO: 2.72 10*3/MM3 (ref 1–3)
LYMPHOCYTES NFR BLD AUTO: 29.4 % (ref 21–51)
MCH RBC QN AUTO: 24.3 PG (ref 27–33)
MCHC RBC AUTO-ENTMCNC: 29.7 G/DL (ref 33–37)
MCV RBC AUTO: 81.9 FL (ref 80–94)
MONOCYTES # BLD AUTO: 0.33 10*3/MM3 (ref 0.1–0.9)
MONOCYTES NFR BLD AUTO: 3.6 % (ref 0–10)
NEUTROPHILS # BLD AUTO: 5.92 10*3/MM3 (ref 1.4–6.5)
NEUTROPHILS NFR BLD AUTO: 63.9 % (ref 30–70)
OSMOLALITY SERPL CALC.SUM OF ELEC: 270.7 MOSM/KG (ref 273–305)
PLATELET # BLD AUTO: 534 10*3/MM3 (ref 130–400)
PMV BLD AUTO: 9.3 FL (ref 6–10)
POTASSIUM BLD-SCNC: 3.9 MMOL/L (ref 3.5–5.3)
RBC # BLD AUTO: 3.82 10*6/MM3 (ref 4.7–6.1)
SODIUM BLD-SCNC: 136 MMOL/L (ref 135–153)
WBC NRBC COR # BLD: 9.26 10*3/MM3 (ref 4.5–12.5)

## 2017-12-13 PROCEDURE — 86140 C-REACTIVE PROTEIN: CPT | Performed by: INTERNAL MEDICINE

## 2017-12-13 PROCEDURE — 80048 BASIC METABOLIC PNL TOTAL CA: CPT | Performed by: INTERNAL MEDICINE

## 2017-12-13 PROCEDURE — 99232 SBSQ HOSP IP/OBS MODERATE 35: CPT | Performed by: INTERNAL MEDICINE

## 2017-12-13 PROCEDURE — 94799 UNLISTED PULMONARY SVC/PX: CPT

## 2017-12-13 PROCEDURE — 85025 COMPLETE CBC W/AUTO DIFF WBC: CPT | Performed by: INTERNAL MEDICINE

## 2017-12-13 PROCEDURE — 25010000002 VANCOMYCIN PER 500 MG

## 2017-12-13 RX ORDER — GABAPENTIN 400 MG/1
800 CAPSULE ORAL EVERY 8 HOURS SCHEDULED
Status: DISCONTINUED | OUTPATIENT
Start: 2017-12-13 | End: 2017-12-18 | Stop reason: HOSPADM

## 2017-12-13 RX ADMIN — METRONIDAZOLE 500 MG: 500 INJECTION, SOLUTION INTRAVENOUS at 21:17

## 2017-12-13 RX ADMIN — DULOXETINE HYDROCHLORIDE 60 MG: 60 CAPSULE, DELAYED RELEASE ORAL at 09:12

## 2017-12-13 RX ADMIN — Medication 5 ML: at 21:18

## 2017-12-13 RX ADMIN — VANCOMYCIN HYDROCHLORIDE 2000 MG: 5 INJECTION, POWDER, LYOPHILIZED, FOR SOLUTION INTRAVENOUS at 16:34

## 2017-12-13 RX ADMIN — TOPIRAMATE 100 MG: 100 TABLET, FILM COATED ORAL at 09:11

## 2017-12-13 RX ADMIN — ZINC OXIDE: 200 OINTMENT TOPICAL at 21:18

## 2017-12-13 RX ADMIN — METRONIDAZOLE 500 MG: 500 INJECTION, SOLUTION INTRAVENOUS at 13:34

## 2017-12-13 RX ADMIN — TOPIRAMATE 100 MG: 100 TABLET, FILM COATED ORAL at 16:33

## 2017-12-13 RX ADMIN — Medication 5 ML: at 08:14

## 2017-12-13 RX ADMIN — VANCOMYCIN HYDROCHLORIDE 2000 MG: 5 INJECTION, POWDER, LYOPHILIZED, FOR SOLUTION INTRAVENOUS at 02:19

## 2017-12-13 RX ADMIN — PANTOPRAZOLE SODIUM 40 MG: 40 TABLET, DELAYED RELEASE ORAL at 06:03

## 2017-12-13 RX ADMIN — METHENAMINE HIPPURATE 1 G: 1 TABLET ORAL at 09:12

## 2017-12-13 RX ADMIN — ZINC OXIDE: 200 OINTMENT TOPICAL at 09:13

## 2017-12-13 RX ADMIN — SODIUM HYPOCHLORITE: 1.25 SOLUTION TOPICAL at 09:11

## 2017-12-13 RX ADMIN — SUCRALFATE 1 G: 1 TABLET ORAL at 11:43

## 2017-12-13 RX ADMIN — DULOXETINE HYDROCHLORIDE 60 MG: 60 CAPSULE, DELAYED RELEASE ORAL at 17:38

## 2017-12-13 RX ADMIN — AZTREONAM 2 G: 2 INJECTION, POWDER, FOR SOLUTION INTRAMUSCULAR; INTRAVENOUS at 23:51

## 2017-12-13 RX ADMIN — METRONIDAZOLE 500 MG: 500 INJECTION, SOLUTION INTRAVENOUS at 05:01

## 2017-12-13 RX ADMIN — TOPIRAMATE 100 MG: 100 TABLET, FILM COATED ORAL at 21:18

## 2017-12-13 RX ADMIN — GABAPENTIN 600 MG: 300 CAPSULE ORAL at 05:01

## 2017-12-13 RX ADMIN — AZTREONAM 2 G: 2 INJECTION, POWDER, FOR SOLUTION INTRAMUSCULAR; INTRAVENOUS at 06:03

## 2017-12-13 RX ADMIN — NYSTATIN 1 APPLICATION: 100000 POWDER TOPICAL at 17:35

## 2017-12-13 RX ADMIN — SODIUM HYPOCHLORITE: 1.25 SOLUTION TOPICAL at 16:33

## 2017-12-13 RX ADMIN — Medication 10 ML: at 09:13

## 2017-12-13 RX ADMIN — ZINC OXIDE: 200 OINTMENT TOPICAL at 16:36

## 2017-12-13 RX ADMIN — ARIPIPRAZOLE 10 MG: 10 TABLET ORAL at 21:17

## 2017-12-13 RX ADMIN — METHENAMINE HIPPURATE 1 G: 1 TABLET ORAL at 17:33

## 2017-12-13 RX ADMIN — SODIUM HYPOCHLORITE: 1.25 SOLUTION TOPICAL at 21:18

## 2017-12-13 RX ADMIN — SUCRALFATE 1 G: 1 TABLET ORAL at 17:36

## 2017-12-13 RX ADMIN — AZTREONAM 2 G: 2 INJECTION, POWDER, FOR SOLUTION INTRAMUSCULAR; INTRAVENOUS at 14:44

## 2017-12-13 RX ADMIN — GABAPENTIN 800 MG: 400 CAPSULE ORAL at 13:33

## 2017-12-13 RX ADMIN — SUCRALFATE 1 G: 1 TABLET ORAL at 06:33

## 2017-12-13 RX ADMIN — Medication 10 ML: at 21:18

## 2017-12-13 RX ADMIN — SUCRALFATE 1 G: 1 TABLET ORAL at 21:17

## 2017-12-13 RX ADMIN — GABAPENTIN 800 MG: 400 CAPSULE ORAL at 21:17

## 2017-12-13 NOTE — PROGRESS NOTES
"  I have personally seen and examined the patient today and discussed overnight interval progress and pertinent issues with nursing staff.    Subjective:    No fever overnight.  Patient resting without complaints.  CRP stable at 6.7.  With normal white count.     History taken from: patient chart RN      Vital Signs    /62 (BP Location: Right arm, Patient Position: Lying)  Pulse 93  Temp 98.3 °F (36.8 °C) (Oral)   Resp 20  Ht 182.9 cm (72\")  Wt 113 kg (249 lb 11.2 oz)  SpO2 99%  BMI 33.87 kg/m2    Temp:  [98.1 °F (36.7 °C)-98.5 °F (36.9 °C)] 98.3 °F (36.8 °C)      Intake/Output Summary (Last 24 hours) at 12/13/17 0850  Last data filed at 12/13/17 0300   Gross per 24 hour   Intake             1080 ml   Output             3025 ml   Net            -1945 ml     Intake & Output (last 3 days)       12/10 0701 - 12/11 0700 12/11 0701 - 12/12 0700 12/12 0701 - 12/13 0700 12/13 0701 - 12/14 0700    P.O. 1800 1560 1440     IV Piggyback  700      Irrigation 150       Total Intake(mL/kg) 1950 (17.2) 2260 (20) 1440 (12.7)     Urine (mL/kg/hr) 3125 (1.1) 2350 (0.9) 2725 (1)     Stool 350 (0.1) 800 (0.3) 300 (0.1)     Total Output 3475 3150 3025      Net -1523 -759 -1562                    Physical exam:    General Appearance:    Alert, cooperative, in no acute distress   Head:    Normocephalic, without obvious abnormality, atraumatic   Eyes:            Lids and lashes normal, conjunctivae and sclerae normal, no   icterus, no pallor, corneas clear, PERRLA   Ears:    Ears appear intact with no abnormalities noted   Throat:   No oral lesions, no thrush, oral mucosa moist   Neck:   No adenopathy, supple, trachea midline, no thyromegaly, no   carotid bruit, no JVD   Back:     No tenderness to percussion or palpation, range of motion   normal   Lungs:     Clear to auscultation,respirations regular, even and unlabored. No wheezing, no ronchi and no crackles.    Heart:    Regular rhythm and normal rate, normal S1 and S2, no  "           murmur, no gallop, no rub, no click   Chest Wall:    No abnormalities observed   Abdomen:     Normal bowel sounds, no masses, no organomegaly, soft        non-tender, non-distended, no guarding, no rebound                tenderness   Rectal:     Deferred   Extremities:   Moves all extremities well, no edema, no cyanosis, no             redness   Pulses:   Pulses palpable and equal bilaterally   Skin:  Right upper arm PICC site red    Lymph nodes:   No palpable adenopathy   Neurologic:   Awake, alert and oriented x 3. Following commands.           Results:      Results from last 7 days  Lab Units 12/13/17  0051 12/12/17  0024 12/11/17  0029 12/10/17  0121 12/09/17  0109 12/08/17  0004 12/07/17  0122   WBC 10*3/mm3 9.26 13.61* 11.03 11.98 11.62 11.52 13.60*     Lab Results   Component Value Date    NEUTROABS 5.92 12/13/2017         Results from last 7 days  Lab Units 12/13/17  0051   CREATININE mg/dL 0.59         Results from last 7 days  Lab Units 12/13/17  0051 12/12/17  0024 12/11/17  0029   CRP mg/dL 6.77* 6.11* 7.04*       Imaging Results (last 24 hours)     ** No results found for the last 24 hours. **            Results Review:    I have personally reviewed laboratory data, culture results, radiology studies and antimicrobial therapy.    Hospital Medications (active)       Dose Frequency Start End    acetaminophen (TYLENOL) tablet 650 mg 650 mg Every 6 Hours PRN 12/5/2017     Sig - Route: Take 2 tablets by mouth Every 6 (Six) Hours As Needed for Mild Pain  or Fever. - Oral    albuterol (PROVENTIL) nebulizer solution 0.083% 2.5 mg/3mL 2.5 mg Every 6 Hours PRN 12/6/2017     Sig - Route: Take 2.5 mg by nebulization Every 6 (Six) Hours As Needed for Wheezing or Shortness of Air. - Nebulization    ARIPiprazole (ABILIFY) tablet 10 mg 10 mg Nightly 12/6/2017     Sig - Route: Take 1 tablet by mouth Every Night. - Oral    aztreonam (AZACTAM) 2 g/100 mL 0.9% NS (mbp) 2 g Every 8 Hours 12/6/2017 12/16/2017     "Sig - Route: Infuse 100 mL into a venous catheter Every 8 (Eight) Hours. - Intravenous    DULoxetine (CYMBALTA) DR capsule 60 mg 60 mg 2 Times Daily 12/6/2017     Sig - Route: Take 1 capsule by mouth 2 (Two) Times a Day. - Oral    famotidine (PEPCID) injection 20 mg 20 mg Every 12 Hours Scheduled 12/5/2017     Sig - Route: Infuse 2 mL into a venous catheter Every 12 (Twelve) Hours. - Intravenous    gabapentin (NEURONTIN) capsule 600 mg 600 mg Every 8 Hours Scheduled 12/6/2017     Sig - Route: Take 2 capsules by mouth Every 8 (Eight) Hours. - Oral    HYDROmorphone (DILAUDID) 1 MG/ML injection  - ADS Override Pull   12/6/2017 12/7/2017    Notes to Pharmacy: Created by cabinet override    ipratropium-albuterol (DUO-NEB) nebulizer solution 3 mL 3 mL 4 Times Daily - RT 12/5/2017     Sig - Route: Take 3 mL by nebulization 4 (Four) Times a Day. - Nebulization    Magnesium Sulfate 2 gram Bolus, followed by 8 gram infusion (total Mg dose 10 grams)- Mg less than or equal to 1mg/dL 2 g As Needed 12/6/2017     Sig - Route: Infuse 50 mL into a venous catheter As Needed (Mg less than or equal to 1mg/dL). - Intravenous    Cosign for Ordering: Accepted by Inocencia Montano MD on 12/6/2017  8:10 AM    Linked Group 1:  \"Or\" Linked Group Details        magnesium sulfate 4 gram infusion- Mg 1.6-1.9 mg/dL 4 g As Needed 12/6/2017     Sig - Route: Infuse 100 mL into a venous catheter As Needed (Mg 1.6-1.9 mg/dL). - Intravenous    Cosign for Ordering: Accepted by Inocencia Montano MD on 12/6/2017  8:10 AM    Linked Group 1:  \"Or\" Linked Group Details        magnesium sulfate 4 gram infusion- Mg 1.6-1.9 mg/dL 4 g Once 12/6/2017 12/6/2017    Sig - Route: Infuse 100 mL into a venous catheter 1 (One) Time. - Intravenous    Magnesium Sulfate 6 gram Infusion (2 gm x 3) -Mg 1.1 -1.5 mg/dL 2 g As Needed 12/6/2017     Sig - Route: Infuse 50 mL into a venous catheter As Needed (Mg 1.1 -1.5 mg/dL). - Intravenous    Cosign for Ordering: Accepted " "by Inocencia Montano MD on 12/6/2017  8:10 AM    Linked Group 1:  \"Or\" Linked Group Details        methenamine (HIPREX) tablet 1 g 1 g 2 Times Daily 12/6/2017     Sig - Route: Take 1 tablet by mouth 2 (Two) Times a Day. - Oral    metroNIDAZOLE (FLAGYL) IVPB 500 mg 500 mg Every 8 Hours 12/5/2017 12/15/2017    Sig - Route: Infuse 100 mL into a venous catheter Every 8 (Eight) Hours. - Intravenous    nitroglycerin (NITROSTAT) SL tablet 0.4 mg 0.4 mg Every 5 Minutes PRN 12/5/2017     Sig - Route: Place 1 tablet under the tongue Every 5 (Five) Minutes As Needed for Chest Pain (if systolic BP greater than 100 mm/Hg.). - Sublingual    Cosign for Ordering: Accepted by Inocencia Montano MD on 12/5/2017  6:13 PM    nystatin (MYCOSTATIN) powder 1 application 1 application 3 Times Daily PRN 12/6/2017     Sig - Route: Apply 1 application topically 3 (Three) Times a Day As Needed (skin). - Topical    pantoprazole (PROTONIX) EC tablet 40 mg 40 mg Every Morning 12/6/2017     Sig - Route: Take 1 tablet by mouth Every Morning. - Oral    potassium chloride (K-DUR,KLOR-CON) CR tablet 40 mEq 40 mEq Every 4 Hours 12/6/2017 12/6/2017    Sig - Route: Take 2 tablets by mouth Every 4 (Four) Hours. - Oral    potassium chloride (KLOR-CON) packet 40 mEq 40 mEq As Needed 12/6/2017     Sig - Route: Take 40 mEq by mouth As Needed (potassium replacement, see admin instructions). - Oral    Cosign for Ordering: Accepted by Inocencia Montano MD on 12/6/2017  8:10 AM    Linked Group 2:  \"Or\" Linked Group Details        potassium chloride (MICRO-K) CR capsule 40 mEq 40 mEq As Needed 12/6/2017     Sig - Route: Take 4 capsules by mouth As Needed (potassium replacement.  see admin instructions). - Oral    Cosign for Ordering: Accepted by Inocencia Montano MD on 12/6/2017  8:10 AM    Linked Group 2:  \"Or\" Linked Group Details        potassium chloride 10 mEq in 100 mL IVPB 10 mEq Every 1 Hour PRN 12/6/2017     Sig - Route: Infuse 100 mL into a venous " "catheter Every 1 (One) Hour As Needed (potassium protocol PERIPHERAL - see admin instructions). - Intravenous    Cosign for Ordering: Accepted by Inocencia Montano MD on 12/6/2017  8:10 AM    Linked Group 2:  \"Or\" Linked Group Details        sodium chloride 0.9 % flush 1-10 mL 1-10 mL As Needed 12/5/2017     Sig - Route: Infuse 1-10 mL into a venous catheter As Needed for Line Care. - Intravenous    Cosign for Ordering: Accepted by Inocencia Montano MD on 12/5/2017  6:13 PM    sodium chloride 0.9 % infusion 100 mL/hr Continuous 12/5/2017     Sig - Route: Infuse 100 mL/hr into a venous catheter Continuous. - Intravenous    sodium hypochlorite (DAKIN'S 1/4 STRENGTH) 0.125 % topical solution 0.125% solution  3 Times Daily 12/6/2017     Sig - Route: Apply  topically 3 (Three) Times a Day. - Topical    Cosign for Ordering: Accepted by Sung Blair MD on 12/6/2017  4:01 PM    sucralfate (CARAFATE) tablet 1 g 1 g 4 Times Daily Before Meals & Nightly 12/6/2017     Sig - Route: Take 1 tablet by mouth 4 (Four) Times a Day Before Meals & at Bedtime. - Oral    topiramate (TOPAMAX) tablet 100 mg 100 mg 3 Times Daily 12/6/2017     Sig - Route: Take 1 tablet by mouth 3 (Three) Times a Day. - Oral    vancomycin (VANCOCIN) 2,000 mg in sodium chloride 0.9 % 500 mL IVPB 2,000 mg Every 12 Hours 12/6/2017 12/20/2017    Sig - Route: Infuse 2,000 mg into a venous catheter Every 12 (Twelve) Hours. - Intravenous    zinc oxide 20 % ointment  3 Times Daily 12/6/2017     Sig - Route: Apply  topically 3 (Three) Times a Day. - Topical    Pharmacy to dose vancomycin (Discontinued)  Continuous PRN 12/5/2017 12/6/2017    Sig - Route: Continuous As Needed for Consult. - Does not apply    Reason for Discontinue: Duplicate order    sodium hypochlorite (DAKIN'S 1/4 STRENGTH) 0.125 % topical solution 0.125% solution (Discontinued)  2 Times Daily 12/6/2017 12/6/2017    Sig - Route: Apply  topically 2 (Two) Times a Day. - Topical    Cosign for " Ordering: Accepted by Sung Blair MD on 12/6/2017  4:01 PM    vancomycin (VANCOCIN) 2,000 mg in sodium chloride 0.9 % 500 mL IVPB (Discontinued) 2,000 mg Every 24 Hours 12/6/2017 12/6/2017    Sig - Route: Infuse 2,000 mg into a venous catheter Daily. - Intravenous            Cultures:    Blood Culture   Date Value Ref Range Status   12/05/2017 No growth at 24 hours  Preliminary   12/05/2017 No growth at 24 hours  Preliminary   12/05/2017 No growth at 24 hours  Preliminary     Urine Culture   Date Value Ref Range Status   12/05/2017 Culture in progress  Preliminary     Wound Culture   Date Value Ref Range Status   12/05/2017 (A)  Preliminary    Light growth (2+) Gram Negative Bacilli, Morphology consistent with Escherichia / Citrobacter           Assessment/Plan     ASSESSMENT:    1.  Severe sepsis with encephalopathy  2.  Aspiration pneumonia  3.  Decubitus ulcers    PLAN:    No fever overnight.  Patient resting without complaints.  CRP stable at 6.7.  With normal white count.     On broad-spectrum coverage with vancomycin, Azactam and Flagyl.    Awaiting transfer to  when bed available.    We will continue to follow closely.    Patient's findings and recommendations were discussed with patient, nursing staff, primary care team and consulting provider    Code Status: Full Code     Jessica SALAZAR Mauro, GUANACO  12/13/17  8:50 AM     Physician Attestation:    I have personally seen and examined the patient. I reviewed the patient's data including history of present illness, review of systems, physical examination, assessment and treatment plan and agree with findings above. The assessment and plan are my own.  I have reviewed and edited the note above after discussing the findings with the midlevel.    Tra Jain MD  Infectious Diseases  12/13/17  1:20 PM

## 2017-12-13 NOTE — PROGRESS NOTES
Subjective     History:   Ken Krueger is a 42 y.o. male admitted on 12/5/2017 secondary to Sepsis     Procedures:   12/11/17: LUE Powerglide insertion    Patient seen and examined with SHAHNAZ Longo. Awake and alert with his brother present at bedside. Appears to be in better spirits today. Reports some muscle spasms and is asking about the dosing of his Neurontin. Denies any other complaints. No acute events overnight per RN.     History taken from: patient, chart, and RN.      Objective     Vital Signs  Temp:  [97.7 °F (36.5 °C)-98.6 °F (37 °C)] 98.6 °F (37 °C)  Heart Rate:  [66-95] 95  Resp:  [18-20] 20  BP: (106-150)/(62-94) 119/72    Intake/Output Summary (Last 24 hours) at 12/13/17 1707  Last data filed at 12/13/17 1627   Gross per 24 hour   Intake             1440 ml   Output             3275 ml   Net            -1835 ml         Physical Exam:  General:    Awake, alert, in no acute distress   Heart:      Normal S1 and S2. Regular rate and rhythm. No significant murmur, rubs or gallops appreciated.   Lungs:     Respirations regular, even and unlabored. Lungs clear to auscultation B/L. No wheezes, rales or rhonchi.     Abdomen:   Soft and nontender. No guarding, rebound tenderness or  organomegaly noted. Bowel sounds present x 4. (+) ileostomy and colostomy   Extremities:  Left AKA. No edema in RLE.      Results Review:      Results from last 7 days  Lab Units 12/13/17  0051 12/12/17  0024 12/11/17  0029 12/10/17  0121 12/09/17  0109 12/08/17  0004 12/07/17  0122   WBC 10*3/mm3 9.26 13.61* 11.03 11.98 11.62 11.52 13.60*   HEMOGLOBIN g/dL 9.3* 9.8* 9.3* 9.8* 9.6* 8.9* 8.7*   PLATELETS 10*3/mm3 534* 598* 537* 641* 620* 601* 632*       Results from last 7 days  Lab Units 12/13/17  0051 12/12/17  0024 12/11/17  0029 12/10/17  0121 12/09/17  0109 12/08/17  0004 12/07/17  0122   SODIUM mmol/L 136 135 136 135 139 138 140   POTASSIUM mmol/L 3.9 3.7 4.1 3.5 4.2 4.0 4.3   CHLORIDE mmol/L 108 108 107 107 108 109 111   CO2  mmol/L 19.1* 19.4* 19.1* 20.6* 21.8* 21.6* 20.6*   BUN mg/dL 10 10 12 12 14 13 13   CREATININE mg/dL 0.59 0.72 0.58 0.52 0.50 0.62 0.68   CALCIUM mg/dL 8.6 9.1 8.7 9.2 8.5 8.5 8.3   GLUCOSE mg/dL 93 115* 89 95 88 84 100       Results from last 7 days  Lab Units 12/12/17  0024   BILIRUBIN mg/dL 0.2   ALK PHOS U/L 74   AST (SGOT) U/L 24   ALT (SGPT) U/L 22       Results from last 7 days  Lab Units 12/11/17  0029 12/10/17  0121 12/07/17  0122   MAGNESIUM mg/dL 1.8 1.8 2.2               Imaging Results (last 24 hours)     ** No results found for the last 24 hours. **            Medications:    ARIPiprazole 10 mg Oral Nightly   aztreonam 2 g Intravenous Q8H   DULoxetine 60 mg Oral BID   gabapentin 800 mg Oral Q8H   methenamine 1 g Oral BID   metroNIDAZOLE 500 mg Intravenous Q8H   nystatin susp + lidocaine viscous 5 mL Swish & Swallow 4x Daily   pantoprazole 40 mg Oral QAM   sodium chloride 10 mL Intracatheter Q12H   sodium hypochlorite  Topical TID   sucralfate 1 g Oral 4x Daily AC & at Bedtime   topiramate 100 mg Oral TID   vancomycin 2,000 mg Intravenous Q12H   zinc oxide  Topical TID              Assessment/Plan   Severe sepsis: Likely 2/2 infected decubitus ulcer and and UTI. PICC has been removed with concern for possible surrounding erythema. Improving on broad spectrum IV antibiotics. Blood cultures with NGTD. Urine culture revealing Klebsiella.   Wound culture revealing E coli and Group B strep. Cont broad spectrum IV antibiotics per ID and repeat labs in the AM. ID input appreciated.     Decubitus ulcers/bilateral ischial wounds with underlying osteomyelitis: Cont broad spectrum IV antibiotics including Vanc, Azactam and Flagyl. Discussed with ID and general surgery who recommended referral to tertiary care center for possible pelvic debridement. I spoke to hospitalist and orthopedic surgeon on call at [facility] on 12/11/17 and the hospitalist on call, Dr. Soto graciously agreed to accept the patient in transfer.  Orthopedic surgery recommended plastic surgery evaluation upon admission to . I discussed with patient and his brother that he will be transferred to [facility] for further evaluation but he may not be a candidate for surgical intervention. Admissions coordinator at [facility] stated that it could be several days before a bed is available. Will cont current management for now. ID and surgery input appreciated.     Metabolic encephalopathy: Likely 2/2 above. Now resolved. Cont to monitor.     JAKE: Now resolved. Stopped IVF's. Repeat labs in the AM.     Normocytic anemia: Stable. Cont to monitor. Repeat CBC in the AM.     Disposition: Awaiting transfer to [facility].     Pt is at high risk 2/2 severe sepsis, UTI, decubitus ulcers, ischial wounds, osteomyelitis, encephalopathy and paraplegia.       Javier Moscoso,   12/13/17  5:07 PM

## 2017-12-13 NOTE — PLAN OF CARE
Problem: Fall Risk (Adult)  Goal: Identify Related Risk Factors and Signs and Symptoms    12/10/17 0146   Fall Risk   Fall Risk: Related Risk Factors bladder function altered;gait/mobility problems;fatigue/slow reaction;neuro disease/injury;environment unfamiliar   Fall Risk: Signs and Symptoms presence of risk factors       Goal: Absence of Falls  Outcome: Ongoing (interventions implemented as appropriate)    12/12/17 2309   Fall Risk (Adult)   Absence of Falls making progress toward outcome         12/12/17 2309   Fall Risk (Adult)   Absence of Falls making progress toward outcome         Problem: Skin Integrity Impairment, Risk/Actual (Adult)  Goal: Identify Related Risk Factors and Signs and Symptoms  Outcome: Ongoing (interventions implemented as appropriate)    12/10/17 0146   Skin Integrity Impairment, Risk/Actual   Skin Integrity Impairment, Risk/Actual: Related Risk Factors edema;immobility   Signs and Symptoms (Skin Integrity Impairment) edema;erythema nonblanchable       Goal: Skin Integrity/Wound Healing  Outcome: Ongoing (interventions implemented as appropriate)    12/12/17 2309   Skin Integrity Impairment, Risk/Actual (Adult)   Skin Integrity/Wound Healing making progress toward outcome         Problem: Pressure Ulcer Risk (Alejandro Scale) (Adult,Obstetrics,Pediatric)  Goal: Identify Related Risk Factors and Signs and Symptoms  Outcome: Ongoing (interventions implemented as appropriate)    12/10/17 0146   Pressure Ulcer Risk (Alejandro Scale)   Related Risk Factors (Pressure Ulcer Risk (Alejandro Scale)) body weight extremes;infection;mobility impaired       Goal: Skin Integrity  Outcome: Ongoing (interventions implemented as appropriate)    12/12/17 2309   Pressure Ulcer Risk (Alejandro Scale) (Adult,Obstetrics,Pediatric)   Skin Integrity making progress toward outcome         Problem: Infection, Risk/Actual (Adult)  Goal: Identify Related Risk Factors and Signs and Symptoms  Outcome: Ongoing (interventions  implemented as appropriate)    12/07/17 1139 12/10/17 0146   Infection, Risk/Actual   Infection, Risk/Actual: Related Risk Factors prolonged hospitalization;poor personal hygiene;skin integrity impairment;treatment plan --    Signs and Symptoms (Infection, Risk/Actual) --  blood glucose changes;edema;weakness       Goal: Infection Prevention/Resolution  Outcome: Ongoing (interventions implemented as appropriate)    12/12/17 2309   Infection, Risk/Actual (Adult)   Infection Prevention/Resolution making progress toward outcome         Problem: Pressure Ulcer (Adult)  Goal: Signs and Symptoms of Listed Potential Problems Will be Absent or Manageable (Pressure Ulcer)  Outcome: Ongoing (interventions implemented as appropriate)    12/12/17 0443   Pressure Ulcer   Problems Assessed (Pressure Ulcer) all   Problems Present (Pressure Ulcer) wound complications;infection         Problem: Self-Care Deficit (Adult,Obstetrics,Pediatric)  Goal: Improved Ability to Perform BADL and IADL  Outcome: Ongoing (interventions implemented as appropriate)    12/12/17 2309   Self-Care Deficit (Adult,Obstetrics,Pediatric)   Improved Ability to Perform BADL and IADL making progress toward outcome         Problem: Colostomy (Adult)  Goal: Signs and Symptoms of Listed Potential Problems Will be Absent or Manageable (Colostomy)  Outcome: Ongoing (interventions implemented as appropriate)    12/10/17 0146 12/12/17 0443   Colostomy   Problems Assessed (Colostomy) --  all   Problems Present (Colostomy) none --

## 2017-12-13 NOTE — NURSING NOTE
[facility] I gave her an update and the most current set of vitals. She stated they are still awaiting a bed to become available.

## 2017-12-13 NOTE — PLAN OF CARE
Problem: Fall Risk (Adult)  Goal: Identify Related Risk Factors and Signs and Symptoms  Outcome: Ongoing (interventions implemented as appropriate)  Goal: Absence of Falls  Outcome: Ongoing (interventions implemented as appropriate)    Problem: Infection, Risk/Actual (Adult)  Goal: Identify Related Risk Factors and Signs and Symptoms  Outcome: Ongoing (interventions implemented as appropriate)  Goal: Infection Prevention/Resolution  Outcome: Ongoing (interventions implemented as appropriate)    Problem: Pressure Ulcer (Adult)  Goal: Signs and Symptoms of Listed Potential Problems Will be Absent or Manageable (Pressure Ulcer)  Outcome: Ongoing (interventions implemented as appropriate)

## 2017-12-13 NOTE — PROGRESS NOTES
Discharge Planning Assessment   Fabricio     Patient Name: Ken Krueger  MRN: [id number]  Today's Date: 12/13/2017    Admit Date: 12/5/2017       Discharge Plan       12/13/17 1503    Case Management/Social Work Plan    Plan Pt to be transferred to [facility] when bed is available. SS will follow and assist as needed.     Patient/Family In Agreement With Plan yes        Discharge Placement     No information found        Expected Discharge Date and Time     Expected Discharge Date Expected Discharge Time    Dec 14, 2017           Lin Núñez

## 2017-12-14 LAB
ANION GAP SERPL CALCULATED.3IONS-SCNC: 8.9 MMOL/L (ref 3.6–11.2)
BASOPHILS # BLD AUTO: 0.04 10*3/MM3 (ref 0–0.3)
BASOPHILS NFR BLD AUTO: 0.4 % (ref 0–2)
BUN BLD-MCNC: 14 MG/DL (ref 7–21)
BUN/CREAT SERPL: 24.6 (ref 7–25)
CALCIUM SPEC-SCNC: 8.5 MG/DL (ref 7.7–10)
CHLORIDE SERPL-SCNC: 108 MMOL/L (ref 99–112)
CO2 SERPL-SCNC: 19.1 MMOL/L (ref 24.3–31.9)
CREAT BLD-MCNC: 0.57 MG/DL (ref 0.43–1.29)
CRP SERPL-MCNC: 4.47 MG/DL (ref 0–0.99)
DEPRECATED RDW RBC AUTO: 57.7 FL (ref 37–54)
EOSINOPHIL # BLD AUTO: 0.26 10*3/MM3 (ref 0–0.7)
EOSINOPHIL NFR BLD AUTO: 2.6 % (ref 0–5)
ERYTHROCYTE [DISTWIDTH] IN BLOOD BY AUTOMATED COUNT: 19.2 % (ref 11.5–14.5)
GFR SERPL CREATININE-BSD FRML MDRD: >150 ML/MIN/1.73
GLUCOSE BLD-MCNC: 89 MG/DL (ref 70–110)
HCT VFR BLD AUTO: 32.5 % (ref 42–52)
HGB BLD-MCNC: 9.5 G/DL (ref 14–18)
IMM GRANULOCYTES # BLD: 0.04 10*3/MM3 (ref 0–0.03)
IMM GRANULOCYTES NFR BLD: 0.4 % (ref 0–0.5)
LYMPHOCYTES # BLD AUTO: 2.99 10*3/MM3 (ref 1–3)
LYMPHOCYTES NFR BLD AUTO: 29.5 % (ref 21–51)
MCH RBC QN AUTO: 24.1 PG (ref 27–33)
MCHC RBC AUTO-ENTMCNC: 29.2 G/DL (ref 33–37)
MCV RBC AUTO: 82.5 FL (ref 80–94)
MONOCYTES # BLD AUTO: 0.4 10*3/MM3 (ref 0.1–0.9)
MONOCYTES NFR BLD AUTO: 3.9 % (ref 0–10)
NEUTROPHILS # BLD AUTO: 6.4 10*3/MM3 (ref 1.4–6.5)
NEUTROPHILS NFR BLD AUTO: 63.2 % (ref 30–70)
NRBC BLD MANUAL-RTO: 0 /100 WBC (ref 0–0)
OSMOLALITY SERPL CALC.SUM OF ELEC: 271.9 MOSM/KG (ref 273–305)
PLATELET # BLD AUTO: 554 10*3/MM3 (ref 130–400)
PMV BLD AUTO: 9.2 FL (ref 6–10)
POTASSIUM BLD-SCNC: 3.9 MMOL/L (ref 3.5–5.3)
RBC # BLD AUTO: 3.94 10*6/MM3 (ref 4.7–6.1)
SODIUM BLD-SCNC: 136 MMOL/L (ref 135–153)
WBC NRBC COR # BLD: 10.13 10*3/MM3 (ref 4.5–12.5)

## 2017-12-14 PROCEDURE — 80048 BASIC METABOLIC PNL TOTAL CA: CPT | Performed by: INTERNAL MEDICINE

## 2017-12-14 PROCEDURE — 94799 UNLISTED PULMONARY SVC/PX: CPT

## 2017-12-14 PROCEDURE — 85025 COMPLETE CBC W/AUTO DIFF WBC: CPT | Performed by: INTERNAL MEDICINE

## 2017-12-14 PROCEDURE — 99232 SBSQ HOSP IP/OBS MODERATE 35: CPT | Performed by: INTERNAL MEDICINE

## 2017-12-14 PROCEDURE — 25010000002 VANCOMYCIN PER 500 MG

## 2017-12-14 PROCEDURE — 86140 C-REACTIVE PROTEIN: CPT | Performed by: INTERNAL MEDICINE

## 2017-12-14 RX ADMIN — METHENAMINE HIPPURATE 1 G: 1 TABLET ORAL at 08:45

## 2017-12-14 RX ADMIN — GABAPENTIN 800 MG: 400 CAPSULE ORAL at 15:54

## 2017-12-14 RX ADMIN — Medication 10 ML: at 21:32

## 2017-12-14 RX ADMIN — METRONIDAZOLE 500 MG: 500 INJECTION, SOLUTION INTRAVENOUS at 14:48

## 2017-12-14 RX ADMIN — VANCOMYCIN HYDROCHLORIDE 2000 MG: 5 INJECTION, POWDER, LYOPHILIZED, FOR SOLUTION INTRAVENOUS at 04:00

## 2017-12-14 RX ADMIN — METRONIDAZOLE 500 MG: 500 INJECTION, SOLUTION INTRAVENOUS at 21:40

## 2017-12-14 RX ADMIN — Medication 10 ML: at 12:52

## 2017-12-14 RX ADMIN — TOPIRAMATE 100 MG: 100 TABLET, FILM COATED ORAL at 08:44

## 2017-12-14 RX ADMIN — SODIUM HYPOCHLORITE: 1.25 SOLUTION TOPICAL at 21:31

## 2017-12-14 RX ADMIN — NYSTATIN 1 APPLICATION: 100000 POWDER TOPICAL at 21:32

## 2017-12-14 RX ADMIN — SUCRALFATE 1 G: 1 TABLET ORAL at 11:51

## 2017-12-14 RX ADMIN — METHENAMINE HIPPURATE 1 G: 1 TABLET ORAL at 17:56

## 2017-12-14 RX ADMIN — GABAPENTIN 800 MG: 400 CAPSULE ORAL at 21:40

## 2017-12-14 RX ADMIN — ZINC OXIDE: 200 OINTMENT TOPICAL at 21:31

## 2017-12-14 RX ADMIN — METRONIDAZOLE 500 MG: 500 INJECTION, SOLUTION INTRAVENOUS at 06:13

## 2017-12-14 RX ADMIN — ARIPIPRAZOLE 10 MG: 10 TABLET ORAL at 21:31

## 2017-12-14 RX ADMIN — DULOXETINE HYDROCHLORIDE 60 MG: 60 CAPSULE, DELAYED RELEASE ORAL at 08:44

## 2017-12-14 RX ADMIN — AZTREONAM 2 G: 2 INJECTION, POWDER, FOR SOLUTION INTRAMUSCULAR; INTRAVENOUS at 15:02

## 2017-12-14 RX ADMIN — SUCRALFATE 1 G: 1 TABLET ORAL at 06:13

## 2017-12-14 RX ADMIN — PANTOPRAZOLE SODIUM 40 MG: 40 TABLET, DELAYED RELEASE ORAL at 06:13

## 2017-12-14 RX ADMIN — GABAPENTIN 800 MG: 400 CAPSULE ORAL at 06:13

## 2017-12-14 RX ADMIN — TOPIRAMATE 100 MG: 100 TABLET, FILM COATED ORAL at 16:55

## 2017-12-14 RX ADMIN — SUCRALFATE 1 G: 1 TABLET ORAL at 21:31

## 2017-12-14 RX ADMIN — AZTREONAM 2 G: 2 INJECTION, POWDER, FOR SOLUTION INTRAMUSCULAR; INTRAVENOUS at 07:39

## 2017-12-14 RX ADMIN — AZTREONAM 2 G: 2 INJECTION, POWDER, FOR SOLUTION INTRAMUSCULAR; INTRAVENOUS at 23:29

## 2017-12-14 RX ADMIN — TOPIRAMATE 100 MG: 100 TABLET, FILM COATED ORAL at 21:31

## 2017-12-14 RX ADMIN — ZINC OXIDE: 200 OINTMENT TOPICAL at 09:47

## 2017-12-14 RX ADMIN — ZINC OXIDE: 200 OINTMENT TOPICAL at 16:07

## 2017-12-14 RX ADMIN — VANCOMYCIN HYDROCHLORIDE 2000 MG: 5 INJECTION, POWDER, LYOPHILIZED, FOR SOLUTION INTRAVENOUS at 19:46

## 2017-12-14 RX ADMIN — DULOXETINE HYDROCHLORIDE 60 MG: 60 CAPSULE, DELAYED RELEASE ORAL at 18:05

## 2017-12-14 RX ADMIN — SODIUM HYPOCHLORITE: 1.25 SOLUTION TOPICAL at 09:48

## 2017-12-14 NOTE — NURSING NOTE
Teri from [facility] called for an update on pt. I gave updates and last set of vitals. We are still waiting on a bed.

## 2017-12-14 NOTE — PLAN OF CARE
Problem: Patient Care Overview (Adult)  Goal: Plan of Care Review  Outcome: Ongoing (interventions implemented as appropriate)    Problem: Fall Risk (Adult)  Goal: Identify Related Risk Factors and Signs and Symptoms  Outcome: Ongoing (interventions implemented as appropriate)  Goal: Absence of Falls  Outcome: Ongoing (interventions implemented as appropriate)    Problem: Skin Integrity Impairment, Risk/Actual (Adult)  Goal: Identify Related Risk Factors and Signs and Symptoms  Outcome: Ongoing (interventions implemented as appropriate)  Goal: Skin Integrity/Wound Healing  Outcome: Ongoing (interventions implemented as appropriate)    Problem: Pressure Ulcer Risk (Alejandro Scale) (Adult,Obstetrics,Pediatric)  Goal: Identify Related Risk Factors and Signs and Symptoms  Outcome: Ongoing (interventions implemented as appropriate)  Goal: Skin Integrity  Outcome: Ongoing (interventions implemented as appropriate)    Problem: Infection, Risk/Actual (Adult)  Goal: Identify Related Risk Factors and Signs and Symptoms  Outcome: Ongoing (interventions implemented as appropriate)  Goal: Infection Prevention/Resolution  Outcome: Ongoing (interventions implemented as appropriate)    Problem: Pressure Ulcer (Adult)  Goal: Signs and Symptoms of Listed Potential Problems Will be Absent or Manageable (Pressure Ulcer)  Outcome: Ongoing (interventions implemented as appropriate)    Problem: Colostomy (Adult)  Goal: Signs and Symptoms of Listed Potential Problems Will be Absent or Manageable (Colostomy)  Outcome: Ongoing (interventions implemented as appropriate)

## 2017-12-14 NOTE — PLAN OF CARE
Problem: Patient Care Overview (Adult)  Goal: Plan of Care Review  Outcome: Ongoing (interventions implemented as appropriate)    Problem: Fall Risk (Adult)  Goal: Identify Related Risk Factors and Signs and Symptoms  Outcome: Ongoing (interventions implemented as appropriate)  Goal: Absence of Falls  Outcome: Ongoing (interventions implemented as appropriate)    Problem: Skin Integrity Impairment, Risk/Actual (Adult)  Goal: Identify Related Risk Factors and Signs and Symptoms  Outcome: Ongoing (interventions implemented as appropriate)  Goal: Skin Integrity/Wound Healing  Outcome: Ongoing (interventions implemented as appropriate)    Problem: Pressure Ulcer Risk (Alejandro Scale) (Adult,Obstetrics,Pediatric)  Goal: Identify Related Risk Factors and Signs and Symptoms  Outcome: Ongoing (interventions implemented as appropriate)  Goal: Skin Integrity  Outcome: Ongoing (interventions implemented as appropriate)    Problem: Infection, Risk/Actual (Adult)  Goal: Identify Related Risk Factors and Signs and Symptoms  Outcome: Ongoing (interventions implemented as appropriate)  Goal: Infection Prevention/Resolution  Outcome: Ongoing (interventions implemented as appropriate)    Problem: Self-Care Deficit (Adult,Obstetrics,Pediatric)  Goal: Identify Related Risk Factors and Signs and Symptoms  Outcome: Ongoing (interventions implemented as appropriate)  Goal: Improved Ability to Perform BADL and IADL  Outcome: Ongoing (interventions implemented as appropriate)    Problem: Confusion, Acute (Adult)  Goal: Identify Related Risk Factors and Signs and Symptoms  Outcome: Ongoing (interventions implemented as appropriate)  Goal: Cognitive/Functional Impairments Minimized  Outcome: Ongoing (interventions implemented as appropriate)    Problem: Colostomy (Adult)  Goal: Signs and Symptoms of Listed Potential Problems Will be Absent or Manageable (Colostomy)  Outcome: Ongoing (interventions implemented as appropriate)

## 2017-12-14 NOTE — PROGRESS NOTES
HOSPITALIST PROGRESS NOTE    Patient Identification:  Name:  Ken Krueger  Age:  42 y.o.  Sex:  male  :  1975  MRN:  [id number]  Visit Number:  [id number]  Primary Care Provider:  No Known Provider    Length of stay:  9     HPI: 41 yo male admitted with sepsis     Subjective:  The patient is seen resting in bed this evening. He is eating dinner. He denies nausea and/or vomiting. No abdominal pain or difficulty breathing.     Present during exam: Patient's uncle and Qing, RN    Current Hospital Meds:    ARIPiprazole 10 mg Oral Nightly   aztreonam 2 g Intravenous Q8H   DULoxetine 60 mg Oral BID   gabapentin 800 mg Oral Q8H   methenamine 1 g Oral BID   metroNIDAZOLE 500 mg Intravenous Q8H   nystatin susp + lidocaine viscous 5 mL Swish & Swallow 4x Daily   pantoprazole 40 mg Oral QAM   sodium chloride 10 mL Intracatheter Q12H   sodium hypochlorite  Topical TID   sucralfate 1 g Oral 4x Daily AC & at Bedtime   topiramate 100 mg Oral TID   vancomycin 2,000 mg Intravenous Q12H   zinc oxide  Topical TID          Vital Signs  Temp:  [97.6 °F (36.4 °C)-98.5 °F (36.9 °C)] 98.3 °F (36.8 °C)  Heart Rate:  [] 81  Resp:  [18-20] 18  BP: (114-132)/(55-69) 132/55  Last 3 weights    17  1137 17  1744   Weight: 127 kg (280 lb) 113 kg (249 lb 11.2 oz)     Body mass index is 33.87 kg/(m^2).    Physical exam:  Physical Exam   Constitutional: He is oriented to person, place, and time. He appears well-developed and well-nourished. No distress.   HENT:   Head: Normocephalic and atraumatic.   Nose: Nose normal.   Mouth/Throat: Oropharynx is clear and moist and mucous membranes are normal.   Eyes: Conjunctivae and EOM are normal. Pupils are equal, round, and reactive to light. No scleral icterus.   Neck: Trachea normal. Neck supple. No JVD present. Carotid bruit is not present. No thyromegaly present.   Cardiovascular: Normal rate, regular rhythm and normal heart sounds.  Exam reveals no gallop and no friction rub.     No murmur heard.  Pulmonary/Chest: Effort normal. No respiratory distress. He has no wheezes. He has no rales.   Abdominal: Soft. Bowel sounds are normal. He exhibits no distension. There is no tenderness. There is no guarding.   LLQ colostomy with light brown liquid stool and a RLQ iliostomy with clear/light yellow urine   Musculoskeletal:   Status post L BKA   Neurological: He is alert and oriented to person, place, and time. He has normal strength. No cranial nerve deficit.   Skin: Skin is warm, dry and intact. No rash noted. No erythema.   Psychiatric: He has a normal mood and affect. His speech is normal.       Results Review:    Results from last 7 days  Lab Units 12/14/17  0100 12/13/17  0051 12/12/17  0024 12/11/17  0029 12/10/17  0121 12/09/17  0109 12/08/17  0004   WBC 10*3/mm3 10.13 9.26 13.61* 11.03 11.98 11.62 11.52   HEMOGLOBIN g/dL 9.5* 9.3* 9.8* 9.3* 9.8* 9.6* 8.9*   HEMATOCRIT % 32.5* 31.3* 33.5* 32.5* 33.3* 31.7* 29.4*   PLATELETS 10*3/mm3 554* 534* 598* 537* 641* 620* 601*       Results from last 7 days  Lab Units 12/14/17  0100 12/13/17  0051 12/12/17  0024 12/11/17  0029 12/10/17  0121 12/09/17  0109 12/08/17  0004   SODIUM mmol/L 136 136 135 136 135 139 138   POTASSIUM mmol/L 3.9 3.9 3.7 4.1 3.5 4.2 4.0   CHLORIDE mmol/L 108 108 108 107 107 108 109   CO2 mmol/L 19.1* 19.1* 19.4* 19.1* 20.6* 21.8* 21.6*   BUN mg/dL 14 10 10 12 12 14 13   CREATININE mg/dL 0.57 0.59 0.72 0.58 0.52 0.50 0.62   CALCIUM mg/dL 8.5 8.6 9.1 8.7 9.2 8.5 8.5   GLUCOSE mg/dL 89 93 115* 89 95 88 84       Results from last 7 days  Lab Units 12/12/17  0024   BILIRUBIN mg/dL 0.2   ALK PHOS U/L 74   AST (SGOT) U/L 24   ALT (SGPT) U/L 22       Results from last 7 days  Lab Units 12/11/17  0029 12/10/17  0121   MAGNESIUM mg/dL 1.8 1.8           Assessment/Plan      -Severe sepsis that was present upon admission and secondary to an infected decubitus ulcer with osteomyelitis and UTI; cultures revealing light growth of  Escherichia coli and group B streptococcus; urine culture reveals Klebsiella that is sensitive to all antibiotics tested except ampicillin and nitrofurantoin point; blood cultures revealed no growth to date  -Chronic normocytic anemia  -Thrombocytosis  -Acute metabolic encephalopathy secondary to sepsis that has resolved  -Acute kidney injury secondary to sepsis that has resolved    Continue with pharmacy to dose vancomycin, Azactam and Flagyl.  CRP is trending down.  He is awaiting transfer to [facility] to be evaluated by orthopedic surgery for possible debridement and plastic surgery.  Continue with current wound care.  I appreciate IDs recommendations.  Plan to repeat his CBC, ERP and BMP in the morning.    Patient accepted at [facility] on 12/11/17 by Dr. Soto; currently awaiting bed.    The patient is high risk due to the following diagnoses/reasons:  Sepsis, uti, infected decubitus ulcers, osteomyelitis, paraplegia    I discussed the patients findings and my recommendations with patient, family and nursing staff.    Disposition  Awaiting bed at       Cathy Burrell DO  12/14/17  5:08 PM

## 2017-12-14 NOTE — PROGRESS NOTES
"  I have personally seen and examined the patient today and discussed overnight interval progress and pertinent issues with nursing staff.    Subjective:    CRP is showing improvement this morning down to 4.47 from 6.77 with a normal white count.  No fever reported.  Still awaiting possible transfer to  when bed available.    History taken from: patient chart RN      Vital Signs    /64  Pulse 91  Temp 98.5 °F (36.9 °C)  Resp 20  Ht 182.9 cm (72\")  Wt 113 kg (249 lb 11.2 oz)  SpO2 94%  BMI 33.87 kg/m2    Temp:  [97.6 °F (36.4 °C)-98.6 °F (37 °C)] 98.5 °F (36.9 °C)      Intake/Output Summary (Last 24 hours) at 12/14/17 1039  Last data filed at 12/14/17 0648   Gross per 24 hour   Intake             1460 ml   Output             3475 ml   Net            -2015 ml     Intake & Output (last 3 days)       12/11 0701 - 12/12 0700 12/12 0701 - 12/13 0700 12/13 0701 - 12/14 0700 12/14 0701 - 12/15 0700    P.O. 1560 1440 1440     IV Piggyback 700  500     Irrigation        Total Intake(mL/kg) 2260 (20) 1440 (12.7) 1940 (17.1)     Urine (mL/kg/hr) 2350 (0.9) 2725 (1) 3500 (1.3)     Stool 800 (0.3) 300 (0.1) 400 (0.1)     Total Output 3150 3025 3900      Net -656 -0996 -0316                    Physical exam:    General Appearance:    Alert, cooperative, in no acute distress   Head:    Normocephalic, without obvious abnormality, atraumatic   Eyes:            Lids and lashes normal, conjunctivae and sclerae normal, no   icterus, no pallor, corneas clear, PERRLA   Ears:    Ears appear intact with no abnormalities noted   Throat:   No oral lesions, no thrush, oral mucosa moist   Neck:   No adenopathy, supple, trachea midline, no thyromegaly, no   carotid bruit, no JVD   Back:     No tenderness to percussion or palpation, range of motion   normal   Lungs:     Clear to auscultation,respirations regular, even and unlabored. No wheezing, no ronchi and no crackles.    Heart:    Regular rhythm and normal rate, normal S1 and " S2, no            murmur, no gallop, no rub, no click   Chest Wall:    No abnormalities observed   Abdomen:     Normal bowel sounds, no masses, no organomegaly, soft        non-tender, non-distended, no guarding, no rebound                tenderness   Rectal:     Deferred   Extremities:   Moves all extremities well, no edema, no cyanosis, no             redness   Pulses:   Pulses palpable and equal bilaterally   Skin:  Right upper arm PICC site red    Lymph nodes:   No palpable adenopathy   Neurologic:   Awake, alert and oriented x 3. Following commands.           Results:      Results from last 7 days  Lab Units 12/14/17  0100 12/13/17  0051 12/12/17  0024 12/11/17  0029 12/10/17  0121 12/09/17  0109 12/08/17  0004   WBC 10*3/mm3 10.13 9.26 13.61* 11.03 11.98 11.62 11.52     Lab Results   Component Value Date    NEUTROABS 6.40 12/14/2017         Results from last 7 days  Lab Units 12/14/17  0100   CREATININE mg/dL 0.57         Results from last 7 days  Lab Units 12/14/17  0100 12/13/17  0051 12/12/17  0024   CRP mg/dL 4.47* 6.77* 6.11*       Imaging Results (last 24 hours)     ** No results found for the last 24 hours. **            Results Review:    I have personally reviewed laboratory data, culture results, radiology studies and antimicrobial therapy.    Hospital Medications (active)       Dose Frequency Start End    acetaminophen (TYLENOL) tablet 650 mg 650 mg Every 6 Hours PRN 12/5/2017     Sig - Route: Take 2 tablets by mouth Every 6 (Six) Hours As Needed for Mild Pain  or Fever. - Oral    albuterol (PROVENTIL) nebulizer solution 0.083% 2.5 mg/3mL 2.5 mg Every 6 Hours PRN 12/6/2017     Sig - Route: Take 2.5 mg by nebulization Every 6 (Six) Hours As Needed for Wheezing or Shortness of Air. - Nebulization    ARIPiprazole (ABILIFY) tablet 10 mg 10 mg Nightly 12/6/2017     Sig - Route: Take 1 tablet by mouth Every Night. - Oral    aztreonam (AZACTAM) 2 g/100 mL 0.9% NS (mbp) 2 g Every 8 Hours 12/6/2017  "12/16/2017    Sig - Route: Infuse 100 mL into a venous catheter Every 8 (Eight) Hours. - Intravenous    DULoxetine (CYMBALTA) DR capsule 60 mg 60 mg 2 Times Daily 12/6/2017     Sig - Route: Take 1 capsule by mouth 2 (Two) Times a Day. - Oral    famotidine (PEPCID) injection 20 mg 20 mg Every 12 Hours Scheduled 12/5/2017     Sig - Route: Infuse 2 mL into a venous catheter Every 12 (Twelve) Hours. - Intravenous    gabapentin (NEURONTIN) capsule 600 mg 600 mg Every 8 Hours Scheduled 12/6/2017     Sig - Route: Take 2 capsules by mouth Every 8 (Eight) Hours. - Oral    HYDROmorphone (DILAUDID) 1 MG/ML injection  - ADS Override Pull   12/6/2017 12/7/2017    Notes to Pharmacy: Created by cabinet override    ipratropium-albuterol (DUO-NEB) nebulizer solution 3 mL 3 mL 4 Times Daily - RT 12/5/2017     Sig - Route: Take 3 mL by nebulization 4 (Four) Times a Day. - Nebulization    Magnesium Sulfate 2 gram Bolus, followed by 8 gram infusion (total Mg dose 10 grams)- Mg less than or equal to 1mg/dL 2 g As Needed 12/6/2017     Sig - Route: Infuse 50 mL into a venous catheter As Needed (Mg less than or equal to 1mg/dL). - Intravenous    Cosign for Ordering: Accepted by Inocencia Montano MD on 12/6/2017  8:10 AM    Linked Group 1:  \"Or\" Linked Group Details        magnesium sulfate 4 gram infusion- Mg 1.6-1.9 mg/dL 4 g As Needed 12/6/2017     Sig - Route: Infuse 100 mL into a venous catheter As Needed (Mg 1.6-1.9 mg/dL). - Intravenous    Cosign for Ordering: Accepted by Inocencia Montano MD on 12/6/2017  8:10 AM    Linked Group 1:  \"Or\" Linked Group Details        magnesium sulfate 4 gram infusion- Mg 1.6-1.9 mg/dL 4 g Once 12/6/2017 12/6/2017    Sig - Route: Infuse 100 mL into a venous catheter 1 (One) Time. - Intravenous    Magnesium Sulfate 6 gram Infusion (2 gm x 3) -Mg 1.1 -1.5 mg/dL 2 g As Needed 12/6/2017     Sig - Route: Infuse 50 mL into a venous catheter As Needed (Mg 1.1 -1.5 mg/dL). - Intravenous    Cosign for " "Ordering: Accepted by Inocencia Montano MD on 12/6/2017  8:10 AM    Linked Group 1:  \"Or\" Linked Group Details        methenamine (HIPREX) tablet 1 g 1 g 2 Times Daily 12/6/2017     Sig - Route: Take 1 tablet by mouth 2 (Two) Times a Day. - Oral    metroNIDAZOLE (FLAGYL) IVPB 500 mg 500 mg Every 8 Hours 12/5/2017 12/15/2017    Sig - Route: Infuse 100 mL into a venous catheter Every 8 (Eight) Hours. - Intravenous    nitroglycerin (NITROSTAT) SL tablet 0.4 mg 0.4 mg Every 5 Minutes PRN 12/5/2017     Sig - Route: Place 1 tablet under the tongue Every 5 (Five) Minutes As Needed for Chest Pain (if systolic BP greater than 100 mm/Hg.). - Sublingual    Cosign for Ordering: Accepted by Inocencia Montano MD on 12/5/2017  6:13 PM    nystatin (MYCOSTATIN) powder 1 application 1 application 3 Times Daily PRN 12/6/2017     Sig - Route: Apply 1 application topically 3 (Three) Times a Day As Needed (skin). - Topical    pantoprazole (PROTONIX) EC tablet 40 mg 40 mg Every Morning 12/6/2017     Sig - Route: Take 1 tablet by mouth Every Morning. - Oral    potassium chloride (K-DUR,KLOR-CON) CR tablet 40 mEq 40 mEq Every 4 Hours 12/6/2017 12/6/2017    Sig - Route: Take 2 tablets by mouth Every 4 (Four) Hours. - Oral    potassium chloride (KLOR-CON) packet 40 mEq 40 mEq As Needed 12/6/2017     Sig - Route: Take 40 mEq by mouth As Needed (potassium replacement, see admin instructions). - Oral    Cosign for Ordering: Accepted by Inocencia Montano MD on 12/6/2017  8:10 AM    Linked Group 2:  \"Or\" Linked Group Details        potassium chloride (MICRO-K) CR capsule 40 mEq 40 mEq As Needed 12/6/2017     Sig - Route: Take 4 capsules by mouth As Needed (potassium replacement.  see admin instructions). - Oral    Cosign for Ordering: Accepted by Inocencia Montano MD on 12/6/2017  8:10 AM    Linked Group 2:  \"Or\" Linked Group Details        potassium chloride 10 mEq in 100 mL IVPB 10 mEq Every 1 Hour PRN 12/6/2017     Sig - Route: Infuse " "100 mL into a venous catheter Every 1 (One) Hour As Needed (potassium protocol PERIPHERAL - see admin instructions). - Intravenous    Cosign for Ordering: Accepted by Inocencia Montano MD on 12/6/2017  8:10 AM    Linked Group 2:  \"Or\" Linked Group Details        sodium chloride 0.9 % flush 1-10 mL 1-10 mL As Needed 12/5/2017     Sig - Route: Infuse 1-10 mL into a venous catheter As Needed for Line Care. - Intravenous    Cosign for Ordering: Accepted by Inocencia oMntano MD on 12/5/2017  6:13 PM    sodium chloride 0.9 % infusion 100 mL/hr Continuous 12/5/2017     Sig - Route: Infuse 100 mL/hr into a venous catheter Continuous. - Intravenous    sodium hypochlorite (DAKIN'S 1/4 STRENGTH) 0.125 % topical solution 0.125% solution  3 Times Daily 12/6/2017     Sig - Route: Apply  topically 3 (Three) Times a Day. - Topical    Cosign for Ordering: Accepted by Sung Blair MD on 12/6/2017  4:01 PM    sucralfate (CARAFATE) tablet 1 g 1 g 4 Times Daily Before Meals & Nightly 12/6/2017     Sig - Route: Take 1 tablet by mouth 4 (Four) Times a Day Before Meals & at Bedtime. - Oral    topiramate (TOPAMAX) tablet 100 mg 100 mg 3 Times Daily 12/6/2017     Sig - Route: Take 1 tablet by mouth 3 (Three) Times a Day. - Oral    vancomycin (VANCOCIN) 2,000 mg in sodium chloride 0.9 % 500 mL IVPB 2,000 mg Every 12 Hours 12/6/2017 12/20/2017    Sig - Route: Infuse 2,000 mg into a venous catheter Every 12 (Twelve) Hours. - Intravenous    zinc oxide 20 % ointment  3 Times Daily 12/6/2017     Sig - Route: Apply  topically 3 (Three) Times a Day. - Topical    Pharmacy to dose vancomycin (Discontinued)  Continuous PRN 12/5/2017 12/6/2017    Sig - Route: Continuous As Needed for Consult. - Does not apply    Reason for Discontinue: Duplicate order    sodium hypochlorite (DAKIN'S 1/4 STRENGTH) 0.125 % topical solution 0.125% solution (Discontinued)  2 Times Daily 12/6/2017 12/6/2017    Sig - Route: Apply  topically 2 (Two) Times a Day. - " Topical    Cosign for Ordering: Accepted by Sung Blair MD on 12/6/2017  4:01 PM    vancomycin (VANCOCIN) 2,000 mg in sodium chloride 0.9 % 500 mL IVPB (Discontinued) 2,000 mg Every 24 Hours 12/6/2017 12/6/2017    Sig - Route: Infuse 2,000 mg into a venous catheter Daily. - Intravenous            Cultures:    Blood Culture   Date Value Ref Range Status   12/05/2017 No growth at 24 hours  Preliminary   12/05/2017 No growth at 24 hours  Preliminary   12/05/2017 No growth at 24 hours  Preliminary     Urine Culture   Date Value Ref Range Status   12/05/2017 Culture in progress  Preliminary     Wound Culture   Date Value Ref Range Status   12/05/2017 (A)  Preliminary    Light growth (2+) Gram Negative Bacilli, Morphology consistent with Escherichia / Citrobacter           Assessment/Plan     ASSESSMENT:    1.  Severe sepsis with encephalopathy  2.  Aspiration pneumonia  3.  Decubitus ulcers    PLAN:    CRP is showing improvement this morning down to 4.47 from 6.77 with a normal white count.  No fever reported.  Still awaiting possible transfer to  when bed available.    Recommend to continue broad-spectrum coverage with vancomycin, Azactam and Flagyl.    We will continue to follow closely.    Patient's findings and recommendations were discussed with patient and nursing staff    Code Status: Full Code     Susan Royal PA-C  12/14/17  10:39 AM

## 2017-12-15 LAB
ANION GAP SERPL CALCULATED.3IONS-SCNC: 10.1 MMOL/L (ref 3.6–11.2)
BASOPHILS # BLD AUTO: 0.04 10*3/MM3 (ref 0–0.3)
BASOPHILS NFR BLD AUTO: 0.3 % (ref 0–2)
BUN BLD-MCNC: 12 MG/DL (ref 7–21)
BUN/CREAT SERPL: 19.7 (ref 7–25)
CALCIUM SPEC-SCNC: 9.5 MG/DL (ref 7.7–10)
CHLORIDE SERPL-SCNC: 108 MMOL/L (ref 99–112)
CO2 SERPL-SCNC: 19.9 MMOL/L (ref 24.3–31.9)
CREAT BLD-MCNC: 0.61 MG/DL (ref 0.43–1.29)
CRP SERPL-MCNC: 4.14 MG/DL (ref 0–0.99)
DEPRECATED RDW RBC AUTO: 59.3 FL (ref 37–54)
EOSINOPHIL # BLD AUTO: 0.31 10*3/MM3 (ref 0–0.7)
EOSINOPHIL NFR BLD AUTO: 2.7 % (ref 0–5)
ERYTHROCYTE [DISTWIDTH] IN BLOOD BY AUTOMATED COUNT: 19.8 % (ref 11.5–14.5)
GFR SERPL CREATININE-BSD FRML MDRD: 146 ML/MIN/1.73
GLUCOSE BLD-MCNC: 92 MG/DL (ref 70–110)
HCT VFR BLD AUTO: 32.6 % (ref 42–52)
HGB BLD-MCNC: 9.7 G/DL (ref 14–18)
IMM GRANULOCYTES # BLD: 0.03 10*3/MM3 (ref 0–0.03)
IMM GRANULOCYTES NFR BLD: 0.3 % (ref 0–0.5)
LYMPHOCYTES # BLD AUTO: 3.52 10*3/MM3 (ref 1–3)
LYMPHOCYTES NFR BLD AUTO: 30.4 % (ref 21–51)
MAGNESIUM SERPL-MCNC: 1.8 MG/DL (ref 1.7–2.6)
MCH RBC QN AUTO: 24.4 PG (ref 27–33)
MCHC RBC AUTO-ENTMCNC: 29.8 G/DL (ref 33–37)
MCV RBC AUTO: 82.1 FL (ref 80–94)
MONOCYTES # BLD AUTO: 0.45 10*3/MM3 (ref 0.1–0.9)
MONOCYTES NFR BLD AUTO: 3.9 % (ref 0–10)
NEUTROPHILS # BLD AUTO: 7.23 10*3/MM3 (ref 1.4–6.5)
NEUTROPHILS NFR BLD AUTO: 62.4 % (ref 30–70)
OSMOLALITY SERPL CALC.SUM OF ELEC: 275.1 MOSM/KG (ref 273–305)
PLATELET # BLD AUTO: 688 10*3/MM3 (ref 130–400)
PMV BLD AUTO: 10.2 FL (ref 6–10)
POTASSIUM BLD-SCNC: 3.3 MMOL/L (ref 3.5–5.3)
POTASSIUM BLD-SCNC: 3.8 MMOL/L (ref 3.5–5.3)
RBC # BLD AUTO: 3.97 10*6/MM3 (ref 4.7–6.1)
SODIUM BLD-SCNC: 138 MMOL/L (ref 135–153)
WBC NRBC COR # BLD: 11.58 10*3/MM3 (ref 4.5–12.5)

## 2017-12-15 PROCEDURE — 94799 UNLISTED PULMONARY SVC/PX: CPT

## 2017-12-15 PROCEDURE — 86140 C-REACTIVE PROTEIN: CPT | Performed by: INTERNAL MEDICINE

## 2017-12-15 PROCEDURE — 80048 BASIC METABOLIC PNL TOTAL CA: CPT | Performed by: INTERNAL MEDICINE

## 2017-12-15 PROCEDURE — 83735 ASSAY OF MAGNESIUM: CPT | Performed by: PHYSICIAN ASSISTANT

## 2017-12-15 PROCEDURE — 25010000002 VANCOMYCIN PER 500 MG

## 2017-12-15 PROCEDURE — 85025 COMPLETE CBC W/AUTO DIFF WBC: CPT | Performed by: INTERNAL MEDICINE

## 2017-12-15 PROCEDURE — 99232 SBSQ HOSP IP/OBS MODERATE 35: CPT | Performed by: INTERNAL MEDICINE

## 2017-12-15 PROCEDURE — 84132 ASSAY OF SERUM POTASSIUM: CPT | Performed by: INTERNAL MEDICINE

## 2017-12-15 RX ORDER — POTASSIUM CHLORIDE 20 MEQ/1
40 TABLET, EXTENDED RELEASE ORAL EVERY 4 HOURS
Status: COMPLETED | OUTPATIENT
Start: 2017-12-15 | End: 2017-12-15

## 2017-12-15 RX ORDER — MAGNESIUM SULFATE HEPTAHYDRATE 40 MG/ML
4 INJECTION, SOLUTION INTRAVENOUS ONCE
Status: COMPLETED | OUTPATIENT
Start: 2017-12-15 | End: 2017-12-15

## 2017-12-15 RX ADMIN — POTASSIUM CHLORIDE 40 MEQ: 1500 TABLET, EXTENDED RELEASE ORAL at 09:27

## 2017-12-15 RX ADMIN — AZTREONAM 2 G: 2 INJECTION, POWDER, FOR SOLUTION INTRAMUSCULAR; INTRAVENOUS at 15:19

## 2017-12-15 RX ADMIN — ZINC OXIDE: 200 OINTMENT TOPICAL at 15:22

## 2017-12-15 RX ADMIN — SUCRALFATE 1 G: 1 TABLET ORAL at 11:49

## 2017-12-15 RX ADMIN — ARIPIPRAZOLE 10 MG: 10 TABLET ORAL at 20:15

## 2017-12-15 RX ADMIN — GABAPENTIN 800 MG: 400 CAPSULE ORAL at 13:55

## 2017-12-15 RX ADMIN — DULOXETINE HYDROCHLORIDE 60 MG: 60 CAPSULE, DELAYED RELEASE ORAL at 09:27

## 2017-12-15 RX ADMIN — SODIUM HYPOCHLORITE: 1.25 SOLUTION TOPICAL at 15:22

## 2017-12-15 RX ADMIN — AZTREONAM 2 G: 2 INJECTION, POWDER, FOR SOLUTION INTRAMUSCULAR; INTRAVENOUS at 08:00

## 2017-12-15 RX ADMIN — METRONIDAZOLE 500 MG: 500 INJECTION, SOLUTION INTRAVENOUS at 06:36

## 2017-12-15 RX ADMIN — METRONIDAZOLE 500 MG: 500 INJECTION, SOLUTION INTRAVENOUS at 13:56

## 2017-12-15 RX ADMIN — VANCOMYCIN HYDROCHLORIDE 2000 MG: 5 INJECTION, POWDER, LYOPHILIZED, FOR SOLUTION INTRAVENOUS at 15:18

## 2017-12-15 RX ADMIN — DULOXETINE HYDROCHLORIDE 60 MG: 60 CAPSULE, DELAYED RELEASE ORAL at 17:23

## 2017-12-15 RX ADMIN — TOPIRAMATE 100 MG: 100 TABLET, FILM COATED ORAL at 20:15

## 2017-12-15 RX ADMIN — ZINC OXIDE: 200 OINTMENT TOPICAL at 20:15

## 2017-12-15 RX ADMIN — TOPIRAMATE 100 MG: 100 TABLET, FILM COATED ORAL at 09:27

## 2017-12-15 RX ADMIN — POTASSIUM CHLORIDE 40 MEQ: 1500 TABLET, EXTENDED RELEASE ORAL at 06:39

## 2017-12-15 RX ADMIN — Medication 10 ML: at 09:28

## 2017-12-15 RX ADMIN — VANCOMYCIN HYDROCHLORIDE 2000 MG: 5 INJECTION, POWDER, LYOPHILIZED, FOR SOLUTION INTRAVENOUS at 04:00

## 2017-12-15 RX ADMIN — METHENAMINE HIPPURATE 1 G: 1 TABLET ORAL at 09:27

## 2017-12-15 RX ADMIN — METRONIDAZOLE 500 MG: 500 INJECTION, SOLUTION INTRAVENOUS at 22:23

## 2017-12-15 RX ADMIN — GABAPENTIN 800 MG: 400 CAPSULE ORAL at 06:36

## 2017-12-15 RX ADMIN — SUCRALFATE 1 G: 1 TABLET ORAL at 20:15

## 2017-12-15 RX ADMIN — GABAPENTIN 800 MG: 400 CAPSULE ORAL at 22:19

## 2017-12-15 RX ADMIN — SUCRALFATE 1 G: 1 TABLET ORAL at 17:23

## 2017-12-15 RX ADMIN — SUCRALFATE 1 G: 1 TABLET ORAL at 06:36

## 2017-12-15 RX ADMIN — AZTREONAM 2 G: 2 INJECTION, POWDER, FOR SOLUTION INTRAMUSCULAR; INTRAVENOUS at 23:19

## 2017-12-15 RX ADMIN — PANTOPRAZOLE SODIUM 40 MG: 40 TABLET, DELAYED RELEASE ORAL at 06:36

## 2017-12-15 RX ADMIN — Medication 10 ML: at 20:15

## 2017-12-15 RX ADMIN — TOPIRAMATE 100 MG: 100 TABLET, FILM COATED ORAL at 15:25

## 2017-12-15 RX ADMIN — METHENAMINE HIPPURATE 1 G: 1 TABLET ORAL at 17:23

## 2017-12-15 RX ADMIN — ZINC OXIDE: 200 OINTMENT TOPICAL at 09:28

## 2017-12-15 RX ADMIN — MAGNESIUM SULFATE IN WATER 4 G: 40 INJECTION, SOLUTION INTRAVENOUS at 19:10

## 2017-12-15 RX ADMIN — SODIUM HYPOCHLORITE: 1.25 SOLUTION TOPICAL at 09:28

## 2017-12-15 RX ADMIN — SODIUM HYPOCHLORITE: 1.25 SOLUTION TOPICAL at 20:15

## 2017-12-15 NOTE — PROGRESS NOTES
Nutrition Services    Patient Name:  Ken Krueger  YOB: 1975  MRN: [id number]  Admit Date:  12/5/2017    RECOMMEND:  ADDING MVI DAILY to aid in healing.    Pt w/ 100% intakes meals of Reg Consistent carb diet.  WILL ADD XTRA MEAT W/ MEALS for increased protein for healing r/t pt dislikes supplements.  RD will follow.      Electronically signed by:  Veronique Estrada RD  12/15/17 10:39 AM

## 2017-12-15 NOTE — PLAN OF CARE
Problem: Patient Care Overview (Adult)  Goal: Plan of Care Review  Outcome: Ongoing (interventions implemented as appropriate)    12/15/17 0630   Coping/Psychosocial Response Interventions   Plan Of Care Reviewed With patient   Patient Care Overview   Progress no change         Problem: Pressure Ulcer (Adult)  Goal: Signs and Symptoms of Listed Potential Problems Will be Absent or Manageable (Pressure Ulcer)  Outcome: Ongoing (interventions implemented as appropriate)

## 2017-12-15 NOTE — PROGRESS NOTES
Discharge Planning Assessment   Fabricio     Patient Name: Ken Krueger  MRN: [id number]  Today's Date: 12/15/2017    Admit Date: 12/5/2017       Discharge Plan       12/15/17 1049    Case Management/Social Work Plan    Plan Pt to be transferred to [facility] when bed is available. SS to follow.              Expected Discharge Date and Time     Expected Discharge Date Expected Discharge Time    Dec 16, 2017           Alycia Reed

## 2017-12-15 NOTE — PROGRESS NOTES
"  I have personally seen and examined the patient today and discussed overnight interval progress and pertinent issues with nursing staff.    Subjective:    CRP level continues to show improvement down to 4.14 with normal white count and no fever overnight.  No issues reported by his nurse.  Patient seems to be comfortable this morning and denies any complaints.    History taken from: patient chart RN      Vital Signs    /76  Pulse 74  Temp 97.7 °F (36.5 °C)  Resp 18  Ht 182.9 cm (72\")  Wt 113 kg (249 lb 11.2 oz)  SpO2 96%  BMI 33.87 kg/m2    Temp:  [97.7 °F (36.5 °C)-98.6 °F (37 °C)] 97.7 °F (36.5 °C)      Intake/Output Summary (Last 24 hours) at 12/15/17 1029  Last data filed at 12/15/17 0800   Gross per 24 hour   Intake             1760 ml   Output             2475 ml   Net             -715 ml     Intake & Output (last 3 days)       12/12 0701 - 12/13 0700 12/13 0701 - 12/14 0700 12/14 0701 - 12/15 0700 12/15 0701 - 12/16 0700    P.O. 1440 1440 1320     IV Piggyback  500 700 100    Total Intake(mL/kg) 1440 (12.7) 1940 (17.1) 2020 (17.8) 100 (0.9)    Urine (mL/kg/hr) 2725 (1) 3500 (1.3) 2000 (0.7)     Stool 300 (0.1) 400 (0.1) 475 (0.2)     Total Output 3025 3900 2475      Net -1585 -1960 -455 +100                  Physical exam:    General Appearance:    Alert, cooperative, in no acute distress   Head:    Normocephalic, without obvious abnormality, atraumatic   Eyes:            Lids and lashes normal, conjunctivae and sclerae normal, no   icterus, no pallor, corneas clear, PERRLA   Ears:    Ears appear intact with no abnormalities noted   Throat:   No oral lesions, no thrush, oral mucosa moist   Neck:   No adenopathy, supple, trachea midline, no thyromegaly, no   carotid bruit, no JVD   Back:     No tenderness to percussion or palpation, range of motion   normal   Lungs:     Clear to auscultation,respirations regular, even and unlabored. No wheezing, no ronchi and no crackles.    Heart:    Regular " rhythm and normal rate, normal S1 and S2, no            murmur, no gallop, no rub, no click   Chest Wall:    No abnormalities observed   Abdomen:     Normal bowel sounds, no masses, no organomegaly, soft        non-tender, non-distended, no guarding, no rebound                tenderness   Rectal:     Deferred   Extremities:   Moves all extremities well, no edema, no cyanosis, no             redness   Pulses:   Pulses palpable and equal bilaterally   Skin:  Right upper arm PICC site red    Lymph nodes:   No palpable adenopathy   Neurologic:   Awake, alert and oriented x 3. Following commands.           Results:      Results from last 7 days  Lab Units 12/15/17  0117 12/14/17  0100 12/13/17  0051 12/12/17  0024 12/11/17  0029 12/10/17  0121 12/09/17  0109   WBC 10*3/mm3 11.58 10.13 9.26 13.61* 11.03 11.98 11.62     Lab Results   Component Value Date    NEUTROABS 7.23 (H) 12/15/2017         Results from last 7 days  Lab Units 12/15/17  0401   CREATININE mg/dL 0.61         Results from last 7 days  Lab Units 12/15/17  0117 12/14/17  0100 12/13/17  0051   CRP mg/dL 4.14* 4.47* 6.77*       Imaging Results (last 24 hours)     ** No results found for the last 24 hours. **            Results Review:    I have personally reviewed laboratory data, culture results, radiology studies and antimicrobial therapy.    Hospital Medications (active)       Dose Frequency Start End    acetaminophen (TYLENOL) tablet 650 mg 650 mg Every 6 Hours PRN 12/5/2017     Sig - Route: Take 2 tablets by mouth Every 6 (Six) Hours As Needed for Mild Pain  or Fever. - Oral    albuterol (PROVENTIL) nebulizer solution 0.083% 2.5 mg/3mL 2.5 mg Every 6 Hours PRN 12/6/2017     Sig - Route: Take 2.5 mg by nebulization Every 6 (Six) Hours As Needed for Wheezing or Shortness of Air. - Nebulization    ARIPiprazole (ABILIFY) tablet 10 mg 10 mg Nightly 12/6/2017     Sig - Route: Take 1 tablet by mouth Every Night. - Oral    aztreonam (AZACTAM) 2 g/100 mL 0.9% NS  "(mbp) 2 g Every 8 Hours 12/6/2017 12/16/2017    Sig - Route: Infuse 100 mL into a venous catheter Every 8 (Eight) Hours. - Intravenous    DULoxetine (CYMBALTA) DR capsule 60 mg 60 mg 2 Times Daily 12/6/2017     Sig - Route: Take 1 capsule by mouth 2 (Two) Times a Day. - Oral    famotidine (PEPCID) injection 20 mg 20 mg Every 12 Hours Scheduled 12/5/2017     Sig - Route: Infuse 2 mL into a venous catheter Every 12 (Twelve) Hours. - Intravenous    gabapentin (NEURONTIN) capsule 600 mg 600 mg Every 8 Hours Scheduled 12/6/2017     Sig - Route: Take 2 capsules by mouth Every 8 (Eight) Hours. - Oral    HYDROmorphone (DILAUDID) 1 MG/ML injection  - ADS Override Pull   12/6/2017 12/7/2017    Notes to Pharmacy: Created by cabinet override    ipratropium-albuterol (DUO-NEB) nebulizer solution 3 mL 3 mL 4 Times Daily - RT 12/5/2017     Sig - Route: Take 3 mL by nebulization 4 (Four) Times a Day. - Nebulization    Magnesium Sulfate 2 gram Bolus, followed by 8 gram infusion (total Mg dose 10 grams)- Mg less than or equal to 1mg/dL 2 g As Needed 12/6/2017     Sig - Route: Infuse 50 mL into a venous catheter As Needed (Mg less than or equal to 1mg/dL). - Intravenous    Cosign for Ordering: Accepted by Inocencia Montano MD on 12/6/2017  8:10 AM    Linked Group 1:  \"Or\" Linked Group Details        magnesium sulfate 4 gram infusion- Mg 1.6-1.9 mg/dL 4 g As Needed 12/6/2017     Sig - Route: Infuse 100 mL into a venous catheter As Needed (Mg 1.6-1.9 mg/dL). - Intravenous    Cosign for Ordering: Accepted by Inocencia Montano MD on 12/6/2017  8:10 AM    Linked Group 1:  \"Or\" Linked Group Details        magnesium sulfate 4 gram infusion- Mg 1.6-1.9 mg/dL 4 g Once 12/6/2017 12/6/2017    Sig - Route: Infuse 100 mL into a venous catheter 1 (One) Time. - Intravenous    Magnesium Sulfate 6 gram Infusion (2 gm x 3) -Mg 1.1 -1.5 mg/dL 2 g As Needed 12/6/2017     Sig - Route: Infuse 50 mL into a venous catheter As Needed (Mg 1.1 -1.5 mg/dL). " "- Intravenous    Cosign for Ordering: Accepted by Inocencia Montano MD on 12/6/2017  8:10 AM    Linked Group 1:  \"Or\" Linked Group Details        methenamine (HIPREX) tablet 1 g 1 g 2 Times Daily 12/6/2017     Sig - Route: Take 1 tablet by mouth 2 (Two) Times a Day. - Oral    metroNIDAZOLE (FLAGYL) IVPB 500 mg 500 mg Every 8 Hours 12/5/2017 12/15/2017    Sig - Route: Infuse 100 mL into a venous catheter Every 8 (Eight) Hours. - Intravenous    nitroglycerin (NITROSTAT) SL tablet 0.4 mg 0.4 mg Every 5 Minutes PRN 12/5/2017     Sig - Route: Place 1 tablet under the tongue Every 5 (Five) Minutes As Needed for Chest Pain (if systolic BP greater than 100 mm/Hg.). - Sublingual    Cosign for Ordering: Accepted by Inocencia Montano MD on 12/5/2017  6:13 PM    nystatin (MYCOSTATIN) powder 1 application 1 application 3 Times Daily PRN 12/6/2017     Sig - Route: Apply 1 application topically 3 (Three) Times a Day As Needed (skin). - Topical    pantoprazole (PROTONIX) EC tablet 40 mg 40 mg Every Morning 12/6/2017     Sig - Route: Take 1 tablet by mouth Every Morning. - Oral    potassium chloride (K-DUR,KLOR-CON) CR tablet 40 mEq 40 mEq Every 4 Hours 12/6/2017 12/6/2017    Sig - Route: Take 2 tablets by mouth Every 4 (Four) Hours. - Oral    potassium chloride (KLOR-CON) packet 40 mEq 40 mEq As Needed 12/6/2017     Sig - Route: Take 40 mEq by mouth As Needed (potassium replacement, see admin instructions). - Oral    Cosign for Ordering: Accepted by Inocencia Montano MD on 12/6/2017  8:10 AM    Linked Group 2:  \"Or\" Linked Group Details        potassium chloride (MICRO-K) CR capsule 40 mEq 40 mEq As Needed 12/6/2017     Sig - Route: Take 4 capsules by mouth As Needed (potassium replacement.  see admin instructions). - Oral    Cosign for Ordering: Accepted by Inocencia Montano MD on 12/6/2017  8:10 AM    Linked Group 2:  \"Or\" Linked Group Details        potassium chloride 10 mEq in 100 mL IVPB 10 mEq Every 1 Hour PRN " "12/6/2017     Sig - Route: Infuse 100 mL into a venous catheter Every 1 (One) Hour As Needed (potassium protocol PERIPHERAL - see admin instructions). - Intravenous    Cosign for Ordering: Accepted by Inocencia Montano MD on 12/6/2017  8:10 AM    Linked Group 2:  \"Or\" Linked Group Details        sodium chloride 0.9 % flush 1-10 mL 1-10 mL As Needed 12/5/2017     Sig - Route: Infuse 1-10 mL into a venous catheter As Needed for Line Care. - Intravenous    Cosign for Ordering: Accepted by Inocencia Montano MD on 12/5/2017  6:13 PM    sodium chloride 0.9 % infusion 100 mL/hr Continuous 12/5/2017     Sig - Route: Infuse 100 mL/hr into a venous catheter Continuous. - Intravenous    sodium hypochlorite (DAKIN'S 1/4 STRENGTH) 0.125 % topical solution 0.125% solution  3 Times Daily 12/6/2017     Sig - Route: Apply  topically 3 (Three) Times a Day. - Topical    Cosign for Ordering: Accepted by Sung Blair MD on 12/6/2017  4:01 PM    sucralfate (CARAFATE) tablet 1 g 1 g 4 Times Daily Before Meals & Nightly 12/6/2017     Sig - Route: Take 1 tablet by mouth 4 (Four) Times a Day Before Meals & at Bedtime. - Oral    topiramate (TOPAMAX) tablet 100 mg 100 mg 3 Times Daily 12/6/2017     Sig - Route: Take 1 tablet by mouth 3 (Three) Times a Day. - Oral    vancomycin (VANCOCIN) 2,000 mg in sodium chloride 0.9 % 500 mL IVPB 2,000 mg Every 12 Hours 12/6/2017 12/20/2017    Sig - Route: Infuse 2,000 mg into a venous catheter Every 12 (Twelve) Hours. - Intravenous    zinc oxide 20 % ointment  3 Times Daily 12/6/2017     Sig - Route: Apply  topically 3 (Three) Times a Day. - Topical    Pharmacy to dose vancomycin (Discontinued)  Continuous PRN 12/5/2017 12/6/2017    Sig - Route: Continuous As Needed for Consult. - Does not apply    Reason for Discontinue: Duplicate order    sodium hypochlorite (DAKIN'S 1/4 STRENGTH) 0.125 % topical solution 0.125% solution (Discontinued)  2 Times Daily 12/6/2017 12/6/2017    Sig - Route: Apply  " topically 2 (Two) Times a Day. - Topical    Cosign for Ordering: Accepted by Sung Blair MD on 12/6/2017  4:01 PM    vancomycin (VANCOCIN) 2,000 mg in sodium chloride 0.9 % 500 mL IVPB (Discontinued) 2,000 mg Every 24 Hours 12/6/2017 12/6/2017    Sig - Route: Infuse 2,000 mg into a venous catheter Daily. - Intravenous            Cultures:    Blood Culture   Date Value Ref Range Status   12/05/2017 No growth at 24 hours  Preliminary   12/05/2017 No growth at 24 hours  Preliminary   12/05/2017 No growth at 24 hours  Preliminary     Urine Culture   Date Value Ref Range Status   12/05/2017 Culture in progress  Preliminary     Wound Culture   Date Value Ref Range Status   12/05/2017 (A)  Preliminary    Light growth (2+) Gram Negative Bacilli, Morphology consistent with Escherichia / Citrobacter           Assessment/Plan     ASSESSMENT:    1.  Severe sepsis with encephalopathy  2.  Aspiration pneumonia  3.  Decubitus ulcers    PLAN:    CRP level continues to show improvement down to 4.14 with normal white count and no fever overnight.  No issues reported by his nurse.  Patient seems to be comfortable this morning and denies any complaints.    Still awaiting possible transfer to  when bed available.    Recommend to continue broad-spectrum coverage with vancomycin, Azactam and Flagyl.    We will continue to follow closely.    Patient's findings and recommendations were discussed with patient and nursing staff    Code Status: Full Code     Susan Royal PA-C  12/15/17  10:29 AM

## 2017-12-15 NOTE — PROGRESS NOTES
HOSPITALIST PROGRESS NOTE    Patient Identification:  Name:  Ken Krueger  Age:  42 y.o.  Sex:  male  :  1975  MRN:  [id number]  Visit Number:  [id number]  Primary Care Provider:  No Known Provider    Length of stay:  10     HPI: 43 yo male admitted with sepsis     Subjective:    The patient is resting in bed for afternoon.  He is very pleasant and has no complaints.  He specifically denies any difficulty breathing, nausea and/or abdominal pain.    Present during exam: SHAHNAZ Vigil and the patient's brother    Current Hospital Meds:    ARIPiprazole 10 mg Oral Nightly   aztreonam 2 g Intravenous Q8H   DULoxetine 60 mg Oral BID   gabapentin 800 mg Oral Q8H   methenamine 1 g Oral BID   metroNIDAZOLE 500 mg Intravenous Q8H   nystatin susp + lidocaine viscous 5 mL Swish & Swallow 4x Daily   pantoprazole 40 mg Oral QAM   sodium chloride 10 mL Intracatheter Q12H   sodium hypochlorite  Topical TID   sucralfate 1 g Oral 4x Daily AC & at Bedtime   topiramate 100 mg Oral TID   vancomycin 2,000 mg Intravenous Q12H   zinc oxide  Topical TID     Vital Signs  Temp:  [97.7 °F (36.5 °C)-98.6 °F (37 °C)] 97.8 °F (36.6 °C)  Heart Rate:  [74-90] 77  Resp:  [16-20] 20  BP: (123-136)/(76-89) 135/89  Last 3 weights    17  1137 17  1744   Weight: 127 kg (280 lb) 113 kg (249 lb 11.2 oz)     Body mass index is 33.87 kg/(m^2).    Physical exam:  Physical Exam   Constitutional: He is oriented to person, place, and time. He appears well-developed and well-nourished. No distress.   HENT:   Head: Normocephalic and atraumatic.   Nose: Nose normal.   Mouth/Throat: Oropharynx is clear and moist and mucous membranes are normal.   Eyes: Conjunctivae and EOM are normal. Pupils are equal, round, and reactive to light. No scleral icterus.   Neck: Trachea normal. Neck supple. No JVD present. Carotid bruit is not present. No thyromegaly present.   Cardiovascular: Normal rate, regular rhythm and normal heart sounds.  Exam reveals no gallop  and no friction rub.    No murmur heard.  Pulmonary/Chest: Effort normal. No respiratory distress. He has no wheezes. He has no rales.   Abdominal: Soft. Bowel sounds are normal. He exhibits no distension. There is no tenderness. There is no guarding.   LLQ colostomy with light brown liquid stool and a RLQ iliostomy with clear/light yellow urine   Musculoskeletal:   Status post L BKA   Neurological: He is alert and oriented to person, place, and time. He has normal strength. No cranial nerve deficit.   Skin: Skin is warm, dry and intact. No rash noted. No erythema.   Psychiatric: He has a normal mood and affect. His speech is normal.       Results Review:    Results from last 7 days  Lab Units 12/15/17  0117 12/14/17  0100 12/13/17  0051 12/12/17  0024 12/11/17  0029 12/10/17  0121 12/09/17  0109   WBC 10*3/mm3 11.58 10.13 9.26 13.61* 11.03 11.98 11.62   HEMOGLOBIN g/dL 9.7* 9.5* 9.3* 9.8* 9.3* 9.8* 9.6*   HEMATOCRIT % 32.6* 32.5* 31.3* 33.5* 32.5* 33.3* 31.7*   PLATELETS 10*3/mm3 688* 554* 534* 598* 537* 641* 620*       Results from last 7 days  Lab Units 12/15/17  1331 12/15/17  0401 12/14/17  0100 12/13/17  0051 12/12/17  0024 12/11/17  0029 12/10/17  0121 12/09/17  0109   SODIUM mmol/L  --  138 136 136 135 136 135 139   POTASSIUM mmol/L 3.8 3.3* 3.9 3.9 3.7 4.1 3.5 4.2   CHLORIDE mmol/L  --  108 108 108 108 107 107 108   CO2 mmol/L  --  19.9* 19.1* 19.1* 19.4* 19.1* 20.6* 21.8*   BUN mg/dL  --  12 14 10 10 12 12 14   CREATININE mg/dL  --  0.61 0.57 0.59 0.72 0.58 0.52 0.50   CALCIUM mg/dL  --  9.5 8.5 8.6 9.1 8.7 9.2 8.5   GLUCOSE mg/dL  --  92 89 93 115* 89 95 88       Results from last 7 days  Lab Units 12/12/17  0024   BILIRUBIN mg/dL 0.2   ALK PHOS U/L 74   AST (SGOT) U/L 24   ALT (SGPT) U/L 22       Results from last 7 days  Lab Units 12/11/17  0029 12/10/17  0121   MAGNESIUM mg/dL 1.8 1.8              Assessment/Plan      -Severe sepsis that was present upon admission and secondary to an infected decubitus  ulcer with osteomyelitis and UTI; cultures revealing light growth of Escherichia coli and group B streptococcus; urine culture reveals Klebsiella that is sensitive to all antibiotics tested except ampicillin and nitrofurantoin; blood cultures revealed no growth to date  -Chronic normocytic anemia  -Thrombocytosis  -Acute hypokalemia that has resolved  -Acute metabolic encephalopathy secondary to sepsis that has resolved  -Acute kidney injury secondary to sepsis that has resolved    Continue with pharmacy to dose vancomycin, Azactam and Flagyl.  CRP is trending down.  He is awaiting transfer to [facility] to be evaluated by orthopedic surgery for possible debridement and plastic surgery.  Continue with current wound care.  I appreciate IDs recommendations.      Plan to repeat his CBC, CRP and BMP in the morning.    Patient accepted at [facility] on 12/11/17 by Dr. Soto; currently awaiting bed.    The patient is high risk due to the following diagnoses/reasons:  Sepsis, uti, infected decubitus ulcers, osteomyelitis, paraplegia    I discussed the patients findings and my recommendations with patient, family and nursing staff.    Disposition  Awaiting bed at       Cathy Burrell DO  12/15/17  3:24 PM

## 2017-12-15 NOTE — PLAN OF CARE
Problem: Patient Care Overview (Adult)  Goal: Plan of Care Review  Outcome: Ongoing (interventions implemented as appropriate)    12/15/17 0630 12/15/17 0934   Coping/Psychosocial Response Interventions   Plan Of Care Reviewed With --  patient   Patient Care Overview   Progress no change --        Goal: Adult Individualization and Mutuality  Outcome: Ongoing (interventions implemented as appropriate)  Goal: Discharge Needs Assessment  Outcome: Ongoing (interventions implemented as appropriate)    Problem: Fall Risk (Adult)  Goal: Identify Related Risk Factors and Signs and Symptoms  Outcome: Ongoing (interventions implemented as appropriate)    12/14/17 1655   Fall Risk   Fall Risk: Related Risk Factors polypharmacy;environment unfamiliar   Fall Risk: Signs and Symptoms presence of risk factors       Goal: Absence of Falls  Outcome: Ongoing (interventions implemented as appropriate)    12/15/17 1633   Fall Risk (Adult)   Absence of Falls making progress toward outcome         Problem: Skin Integrity Impairment, Risk/Actual (Adult)  Goal: Identify Related Risk Factors and Signs and Symptoms  Outcome: Ongoing (interventions implemented as appropriate)    12/14/17 1655   Skin Integrity Impairment, Risk/Actual   Skin Integrity Impairment, Risk/Actual: Related Risk Factors infection/disease process;sensory impairment;tissue perfusion impaired   Signs and Symptoms (Skin Integrity Impairment) edema       Goal: Skin Integrity/Wound Healing  Outcome: Ongoing (interventions implemented as appropriate)    12/15/17 1633   Skin Integrity Impairment, Risk/Actual (Adult)   Skin Integrity/Wound Healing making progress toward outcome         Problem: Pressure Ulcer Risk (Alejandro Scale) (Adult,Obstetrics,Pediatric)  Goal: Identify Related Risk Factors and Signs and Symptoms  Outcome: Ongoing (interventions implemented as appropriate)    12/14/17 1655   Pressure Ulcer Risk (Alejandro Scale)   Related Risk Factors (Pressure Ulcer Risk  (Alejandro Scale)) hospitalization prolonged;mobility impaired       Goal: Skin Integrity  Outcome: Ongoing (interventions implemented as appropriate)    12/15/17 1633   Pressure Ulcer Risk (Alejandro Scale) (Adult,Obstetrics,Pediatric)   Skin Integrity making progress toward outcome         Problem: Infection, Risk/Actual (Adult)  Goal: Identify Related Risk Factors and Signs and Symptoms  Outcome: Ongoing (interventions implemented as appropriate)    12/14/17 1655   Infection, Risk/Actual   Infection, Risk/Actual: Related Risk Factors chronic illness/condition;skin integrity impairment   Signs and Symptoms (Infection, Risk/Actual) weakness       Goal: Infection Prevention/Resolution  Outcome: Ongoing (interventions implemented as appropriate)    12/15/17 1633   Infection, Risk/Actual (Adult)   Infection Prevention/Resolution making progress toward outcome         Problem: Pressure Ulcer (Adult)  Goal: Signs and Symptoms of Listed Potential Problems Will be Absent or Manageable (Pressure Ulcer)  Outcome: Ongoing (interventions implemented as appropriate)    12/15/17 0735   Pressure Ulcer   Problems Assessed (Pressure Ulcer) all   Problems Present (Pressure Ulcer) wound progression/extension         Problem: Self-Care Deficit (Adult,Obstetrics,Pediatric)  Goal: Identify Related Risk Factors and Signs and Symptoms  Outcome: Ongoing (interventions implemented as appropriate)    12/14/17 1655   Self-Care Deficit   Self-Care Deficit: Related Risk Factors activity intolerance;immobility   Signs and Symptoms (Self-Care Deficit) weakness, paresis, paralysis       Goal: Improved Ability to Perform BADL and IADL  Outcome: Ongoing (interventions implemented as appropriate)    12/15/17 1633   Self-Care Deficit (Adult,Obstetrics,Pediatric)   Improved Ability to Perform BADL and IADL making progress toward outcome         Problem: Colostomy (Adult)  Goal: Signs and Symptoms of Listed Potential Problems Will be Absent or Manageable  (Colostomy)  Outcome: Ongoing (interventions implemented as appropriate)    12/14/17 1655   Colostomy   Problems Assessed (Colostomy) all   Problems Present (Colostomy) none

## 2017-12-16 LAB
ANION GAP SERPL CALCULATED.3IONS-SCNC: 9.7 MMOL/L (ref 3.6–11.2)
BASOPHILS # BLD AUTO: 0.04 10*3/MM3 (ref 0–0.3)
BASOPHILS NFR BLD AUTO: 0.3 % (ref 0–2)
BUN BLD-MCNC: 14 MG/DL (ref 7–21)
BUN/CREAT SERPL: 21.5 (ref 7–25)
CALCIUM SPEC-SCNC: 8.6 MG/DL (ref 7.7–10)
CHLORIDE SERPL-SCNC: 107 MMOL/L (ref 99–112)
CO2 SERPL-SCNC: 20.3 MMOL/L (ref 24.3–31.9)
CREAT BLD-MCNC: 0.65 MG/DL (ref 0.43–1.29)
CRP SERPL-MCNC: 3.69 MG/DL (ref 0–0.99)
DEPRECATED RDW RBC AUTO: 58 FL (ref 37–54)
EOSINOPHIL # BLD AUTO: 0.27 10*3/MM3 (ref 0–0.7)
EOSINOPHIL NFR BLD AUTO: 2.3 % (ref 0–5)
ERYTHROCYTE [DISTWIDTH] IN BLOOD BY AUTOMATED COUNT: 19.6 % (ref 11.5–14.5)
GFR SERPL CREATININE-BSD FRML MDRD: 136 ML/MIN/1.73
GLUCOSE BLD-MCNC: 97 MG/DL (ref 70–110)
HCT VFR BLD AUTO: 32 % (ref 42–52)
HGB BLD-MCNC: 9.4 G/DL (ref 14–18)
IMM GRANULOCYTES # BLD: 0.03 10*3/MM3 (ref 0–0.03)
IMM GRANULOCYTES NFR BLD: 0.3 % (ref 0–0.5)
LYMPHOCYTES # BLD AUTO: 2.99 10*3/MM3 (ref 1–3)
LYMPHOCYTES NFR BLD AUTO: 25.9 % (ref 21–51)
MAGNESIUM SERPL-MCNC: 2.5 MG/DL (ref 1.7–2.6)
MCH RBC QN AUTO: 24.2 PG (ref 27–33)
MCHC RBC AUTO-ENTMCNC: 29.4 G/DL (ref 33–37)
MCV RBC AUTO: 82.5 FL (ref 80–94)
MONOCYTES # BLD AUTO: 0.44 10*3/MM3 (ref 0.1–0.9)
MONOCYTES NFR BLD AUTO: 3.8 % (ref 0–10)
NEUTROPHILS # BLD AUTO: 7.79 10*3/MM3 (ref 1.4–6.5)
NEUTROPHILS NFR BLD AUTO: 67.4 % (ref 30–70)
NRBC BLD MANUAL-RTO: 0 /100 WBC (ref 0–0)
OSMOLALITY SERPL CALC.SUM OF ELEC: 274.2 MOSM/KG (ref 273–305)
PLATELET # BLD AUTO: 614 10*3/MM3 (ref 130–400)
PMV BLD AUTO: 9.2 FL (ref 6–10)
POTASSIUM BLD-SCNC: 4.2 MMOL/L (ref 3.5–5.3)
RBC # BLD AUTO: 3.88 10*6/MM3 (ref 4.7–6.1)
SODIUM BLD-SCNC: 137 MMOL/L (ref 135–153)
WBC NRBC COR # BLD: 11.56 10*3/MM3 (ref 4.5–12.5)

## 2017-12-16 PROCEDURE — 86140 C-REACTIVE PROTEIN: CPT | Performed by: INTERNAL MEDICINE

## 2017-12-16 PROCEDURE — 99232 SBSQ HOSP IP/OBS MODERATE 35: CPT | Performed by: INTERNAL MEDICINE

## 2017-12-16 PROCEDURE — 25010000002 VANCOMYCIN PER 500 MG

## 2017-12-16 PROCEDURE — 94799 UNLISTED PULMONARY SVC/PX: CPT

## 2017-12-16 PROCEDURE — 83735 ASSAY OF MAGNESIUM: CPT | Performed by: INTERNAL MEDICINE

## 2017-12-16 PROCEDURE — 85025 COMPLETE CBC W/AUTO DIFF WBC: CPT | Performed by: INTERNAL MEDICINE

## 2017-12-16 PROCEDURE — 80048 BASIC METABOLIC PNL TOTAL CA: CPT | Performed by: INTERNAL MEDICINE

## 2017-12-16 RX ADMIN — METRONIDAZOLE 500 MG: 500 INJECTION, SOLUTION INTRAVENOUS at 20:55

## 2017-12-16 RX ADMIN — ZINC OXIDE: 200 OINTMENT TOPICAL at 15:00

## 2017-12-16 RX ADMIN — METHENAMINE HIPPURATE 1 G: 1 TABLET ORAL at 09:12

## 2017-12-16 RX ADMIN — METRONIDAZOLE 500 MG: 500 INJECTION, SOLUTION INTRAVENOUS at 13:46

## 2017-12-16 RX ADMIN — SODIUM HYPOCHLORITE: 1.25 SOLUTION TOPICAL at 09:13

## 2017-12-16 RX ADMIN — AZTREONAM 2 G: 2 INJECTION, POWDER, FOR SOLUTION INTRAMUSCULAR; INTRAVENOUS at 15:00

## 2017-12-16 RX ADMIN — SUCRALFATE 1 G: 1 TABLET ORAL at 20:55

## 2017-12-16 RX ADMIN — GABAPENTIN 800 MG: 400 CAPSULE ORAL at 05:48

## 2017-12-16 RX ADMIN — TOPIRAMATE 100 MG: 100 TABLET, FILM COATED ORAL at 20:55

## 2017-12-16 RX ADMIN — VANCOMYCIN HYDROCHLORIDE 2000 MG: 5 INJECTION, POWDER, LYOPHILIZED, FOR SOLUTION INTRAVENOUS at 03:15

## 2017-12-16 RX ADMIN — ARIPIPRAZOLE 10 MG: 10 TABLET ORAL at 20:55

## 2017-12-16 RX ADMIN — SUCRALFATE 1 G: 1 TABLET ORAL at 17:16

## 2017-12-16 RX ADMIN — PANTOPRAZOLE SODIUM 40 MG: 40 TABLET, DELAYED RELEASE ORAL at 06:31

## 2017-12-16 RX ADMIN — VANCOMYCIN HYDROCHLORIDE 2000 MG: 5 INJECTION, POWDER, LYOPHILIZED, FOR SOLUTION INTRAVENOUS at 15:00

## 2017-12-16 RX ADMIN — ZINC OXIDE: 200 OINTMENT TOPICAL at 09:13

## 2017-12-16 RX ADMIN — GABAPENTIN 800 MG: 400 CAPSULE ORAL at 13:45

## 2017-12-16 RX ADMIN — DULOXETINE HYDROCHLORIDE 60 MG: 60 CAPSULE, DELAYED RELEASE ORAL at 17:17

## 2017-12-16 RX ADMIN — ZINC OXIDE: 200 OINTMENT TOPICAL at 20:54

## 2017-12-16 RX ADMIN — SUCRALFATE 1 G: 1 TABLET ORAL at 12:43

## 2017-12-16 RX ADMIN — AZTREONAM 2 G: 2 INJECTION, POWDER, FOR SOLUTION INTRAMUSCULAR; INTRAVENOUS at 06:31

## 2017-12-16 RX ADMIN — METHENAMINE HIPPURATE 1 G: 1 TABLET ORAL at 17:16

## 2017-12-16 RX ADMIN — GABAPENTIN 800 MG: 400 CAPSULE ORAL at 20:55

## 2017-12-16 RX ADMIN — TOPIRAMATE 100 MG: 100 TABLET, FILM COATED ORAL at 09:12

## 2017-12-16 RX ADMIN — TOPIRAMATE 100 MG: 100 TABLET, FILM COATED ORAL at 15:03

## 2017-12-16 RX ADMIN — Medication 10 ML: at 20:54

## 2017-12-16 RX ADMIN — AZTREONAM 2 G: 2 INJECTION, POWDER, FOR SOLUTION INTRAMUSCULAR; INTRAVENOUS at 22:19

## 2017-12-16 RX ADMIN — DULOXETINE HYDROCHLORIDE 60 MG: 60 CAPSULE, DELAYED RELEASE ORAL at 09:12

## 2017-12-16 RX ADMIN — METRONIDAZOLE 500 MG: 500 INJECTION, SOLUTION INTRAVENOUS at 05:48

## 2017-12-16 RX ADMIN — Medication 10 ML: at 09:13

## 2017-12-16 RX ADMIN — SODIUM HYPOCHLORITE: 1.25 SOLUTION TOPICAL at 15:00

## 2017-12-16 RX ADMIN — SUCRALFATE 1 G: 1 TABLET ORAL at 09:12

## 2017-12-16 RX ADMIN — SODIUM HYPOCHLORITE: 1.25 SOLUTION TOPICAL at 20:54

## 2017-12-16 NOTE — PLAN OF CARE
Problem: Patient Care Overview (Adult)  Goal: Plan of Care Review  Outcome: Ongoing (interventions implemented as appropriate)    12/15/17 0630 12/16/17 0915   Coping/Psychosocial Response Interventions   Plan Of Care Reviewed With --  patient   Patient Care Overview   Progress no change --        Goal: Adult Individualization and Mutuality  Outcome: Ongoing (interventions implemented as appropriate)  Goal: Discharge Needs Assessment  Outcome: Ongoing (interventions implemented as appropriate)    Problem: Fall Risk (Adult)  Goal: Identify Related Risk Factors and Signs and Symptoms  Outcome: Ongoing (interventions implemented as appropriate)    12/14/17 1655   Fall Risk   Fall Risk: Related Risk Factors polypharmacy;environment unfamiliar   Fall Risk: Signs and Symptoms presence of risk factors       Goal: Absence of Falls  Outcome: Ongoing (interventions implemented as appropriate)    12/16/17 1053   Fall Risk (Adult)   Absence of Falls making progress toward outcome         Problem: Skin Integrity Impairment, Risk/Actual (Adult)  Goal: Identify Related Risk Factors and Signs and Symptoms  Outcome: Ongoing (interventions implemented as appropriate)    12/14/17 1655   Skin Integrity Impairment, Risk/Actual   Skin Integrity Impairment, Risk/Actual: Related Risk Factors infection/disease process;sensory impairment;tissue perfusion impaired   Signs and Symptoms (Skin Integrity Impairment) edema       Goal: Skin Integrity/Wound Healing  Outcome: Ongoing (interventions implemented as appropriate)    12/16/17 1053   Skin Integrity Impairment, Risk/Actual (Adult)   Skin Integrity/Wound Healing making progress toward outcome         Problem: Pressure Ulcer Risk (Alejandro Scale) (Adult,Obstetrics,Pediatric)  Goal: Identify Related Risk Factors and Signs and Symptoms  Outcome: Ongoing (interventions implemented as appropriate)    12/14/17 1655   Pressure Ulcer Risk (Alejandro Scale)   Related Risk Factors (Pressure Ulcer Risk  (Alejandro Scale)) hospitalization prolonged;mobility impaired         Problem: Infection, Risk/Actual (Adult)  Goal: Identify Related Risk Factors and Signs and Symptoms  Outcome: Ongoing (interventions implemented as appropriate)    12/14/17 1655   Infection, Risk/Actual   Infection, Risk/Actual: Related Risk Factors chronic illness/condition;skin integrity impairment   Signs and Symptoms (Infection, Risk/Actual) weakness       Goal: Infection Prevention/Resolution  Outcome: Ongoing (interventions implemented as appropriate)    12/16/17 1053   Infection, Risk/Actual (Adult)   Infection Prevention/Resolution making progress toward outcome         Problem: Pressure Ulcer (Adult)  Goal: Signs and Symptoms of Listed Potential Problems Will be Absent or Manageable (Pressure Ulcer)  Outcome: Ongoing (interventions implemented as appropriate)    12/15/17 0735   Pressure Ulcer   Problems Assessed (Pressure Ulcer) all   Problems Present (Pressure Ulcer) wound progression/extension         Problem: Self-Care Deficit (Adult,Obstetrics,Pediatric)  Goal: Identify Related Risk Factors and Signs and Symptoms  Outcome: Ongoing (interventions implemented as appropriate)    12/14/17 1655   Self-Care Deficit   Self-Care Deficit: Related Risk Factors activity intolerance;immobility   Signs and Symptoms (Self-Care Deficit) weakness, paresis, paralysis       Goal: Improved Ability to Perform BADL and IADL  Outcome: Ongoing (interventions implemented as appropriate)    12/16/17 1053   Self-Care Deficit (Adult,Obstetrics,Pediatric)   Improved Ability to Perform BADL and IADL making progress toward outcome         Problem: Colostomy (Adult)  Goal: Signs and Symptoms of Listed Potential Problems Will be Absent or Manageable (Colostomy)  Outcome: Ongoing (interventions implemented as appropriate)    12/14/17 1655   Colostomy   Problems Assessed (Colostomy) all   Problems Present (Colostomy) none

## 2017-12-16 NOTE — PROGRESS NOTES
HOSPITALIST PROGRESS NOTE    Patient Identification:  Name:  Ken Krueger  Age:  42 y.o.  Sex:  male  :  1975  MRN:  [id number]  Visit Number:  [id number]  Primary Care Provider:  No Known Provider    Length of stay:  11     HPI: 43 yo male admitted with sepsis     Subjective:    The patient is resting in bed this evening.  He is very pleasant and has no complaints.  He specifically denies any difficulty breathing, nausea and/or abdominal pain.    Present during exam: SHAHNAZ Campo and the patient's brother    Current Hospital Meds:    ARIPiprazole 10 mg Oral Nightly   aztreonam 2 g Intravenous Q8H   DULoxetine 60 mg Oral BID   gabapentin 800 mg Oral Q8H   methenamine 1 g Oral BID   metroNIDAZOLE 500 mg Intravenous Q8H   nystatin susp + lidocaine viscous 5 mL Swish & Swallow 4x Daily   pantoprazole 40 mg Oral QAM   sodium chloride 10 mL Intracatheter Q12H   sodium hypochlorite  Topical TID   sucralfate 1 g Oral 4x Daily AC & at Bedtime   topiramate 100 mg Oral TID   vancomycin 2,000 mg Intravenous Q12H   zinc oxide  Topical TID     Vital Signs  Temp:  [97.8 °F (36.6 °C)-98.6 °F (37 °C)] 98.5 °F (36.9 °C)  Heart Rate:  [] 65  Resp:  [18] 18  BP: ()/(62-82) 142/82  Last 3 weights    17  1137 17  1744   Weight: 127 kg (280 lb) 113 kg (249 lb 11.2 oz)     Body mass index is 33.87 kg/(m^2).    Physical exam:  Physical Exam   Constitutional: He is oriented to person, place, and time. He appears well-developed and well-nourished. No distress.   HENT:   Head: Normocephalic and atraumatic.   Nose: Nose normal.   Mouth/Throat: Oropharynx is clear and moist and mucous membranes are normal.   Eyes: Conjunctivae and EOM are normal. Pupils are equal, round, and reactive to light. No scleral icterus.   Neck: Trachea normal. Neck supple. No JVD present. Carotid bruit is not present. No thyromegaly present.   Cardiovascular: Normal rate, regular rhythm and normal heart sounds.  Exam reveals no gallop and  no friction rub.    No murmur heard.  Pulmonary/Chest: Effort normal. No respiratory distress. He has no wheezes. He has no rales.   Abdominal: Soft. Bowel sounds are normal. He exhibits no distension. There is no tenderness. There is no guarding.   LLQ colostomy with  liquid stool and a RLQ iliostomy with clear/light yellow urine   Musculoskeletal:   Status post L BKA   Neurological: He is alert and oriented to person, place, and time. He has normal strength. No cranial nerve deficit.   Skin: Skin is warm, dry and intact. No rash noted. No erythema.   Psychiatric: He has a normal mood and affect. His speech is normal.       Results Review:    Results from last 7 days  Lab Units 12/16/17  0221 12/15/17  0117 12/14/17  0100 12/13/17  0051 12/12/17  0024 12/11/17  0029 12/10/17  0121   WBC 10*3/mm3 11.56 11.58 10.13 9.26 13.61* 11.03 11.98   HEMOGLOBIN g/dL 9.4* 9.7* 9.5* 9.3* 9.8* 9.3* 9.8*   HEMATOCRIT % 32.0* 32.6* 32.5* 31.3* 33.5* 32.5* 33.3*   PLATELETS 10*3/mm3 614* 688* 554* 534* 598* 537* 641*       Results from last 7 days  Lab Units 12/16/17  0221 12/15/17  1331 12/15/17  0401 12/14/17  0100 12/13/17  0051 12/12/17  0024 12/11/17  0029 12/10/17  0121   SODIUM mmol/L 137  --  138 136 136 135 136 135   POTASSIUM mmol/L 4.2 3.8 3.3* 3.9 3.9 3.7 4.1 3.5   CHLORIDE mmol/L 107  --  108 108 108 108 107 107   CO2 mmol/L 20.3*  --  19.9* 19.1* 19.1* 19.4* 19.1* 20.6*   BUN mg/dL 14  --  12 14 10 10 12 12   CREATININE mg/dL 0.65  --  0.61 0.57 0.59 0.72 0.58 0.52   CALCIUM mg/dL 8.6  --  9.5 8.5 8.6 9.1 8.7 9.2   GLUCOSE mg/dL 97  --  92 89 93 115* 89 95       Results from last 7 days  Lab Units 12/12/17  0024   BILIRUBIN mg/dL 0.2   ALK PHOS U/L 74   AST (SGOT) U/L 24   ALT (SGPT) U/L 22       Results from last 7 days  Lab Units 12/16/17  0221 12/15/17  1331 12/11/17  0029 12/10/17  0121   MAGNESIUM mg/dL 2.5 1.8 1.8 1.8              Assessment/Plan      -Severe sepsis that was present upon admission and secondary  to an infected decubitus ulcer with osteomyelitis and UTI; cultures revealing light growth of Escherichia coli and group B streptococcus; urine culture reveals Klebsiella that is sensitive to all antibiotics tested except ampicillin and nitrofurantoin; blood cultures revealed no growth to date  -Chronic normocytic anemia  -Thrombocytosis; improved  -Acute metabolic encephalopathy secondary to sepsis that has resolved  -Acute kidney injury secondary to sepsis that has resolved    Continue with pharmacy to dose vancomycin, Azactam and Flagyl.  CRP is trending down.  He is awaiting transfer to [facility] to be evaluated by orthopedic surgery for possible debridement and plastic surgery.  Continue with current wound care.  I appreciate IDs recommendations.  Overall patient is stable and is awaiting transfer to  for orthopedic surgery evaluation for pelvic debridement.    Plan to repeat his CBC, CRP and BMP in the morning.    Patient accepted at [facility] on 12/11/17 by Dr. Soto; currently awaiting bed.    The patient is high risk due to the following diagnoses/reasons:  Sepsis, uti, infected decubitus ulcers, osteomyelitis, paraplegia    I discussed the patients findings and my recommendations with patient, family and nursing staff.    Disposition  Awaiting bed at       Cathy Burrell DO  12/16/17  5:36 PM

## 2017-12-16 NOTE — PLAN OF CARE
Problem: Patient Care Overview (Adult)  Goal: Plan of Care Review  Outcome: Ongoing (interventions implemented as appropriate)    12/15/17 0630 12/15/17 0934   Coping/Psychosocial Response Interventions   Plan Of Care Reviewed With --  patient   Patient Care Overview   Progress no change --        Goal: Adult Individualization and Mutuality  Outcome: Ongoing (interventions implemented as appropriate)    Problem: Fall Risk (Adult)  Goal: Identify Related Risk Factors and Signs and Symptoms  Outcome: Ongoing (interventions implemented as appropriate)    12/14/17 1655   Fall Risk   Fall Risk: Related Risk Factors polypharmacy;environment unfamiliar   Fall Risk: Signs and Symptoms presence of risk factors       Goal: Absence of Falls  Outcome: Ongoing (interventions implemented as appropriate)    12/15/17 1633   Fall Risk (Adult)   Absence of Falls making progress toward outcome         Problem: Skin Integrity Impairment, Risk/Actual (Adult)  Goal: Identify Related Risk Factors and Signs and Symptoms  Outcome: Ongoing (interventions implemented as appropriate)    12/14/17 1655   Skin Integrity Impairment, Risk/Actual   Skin Integrity Impairment, Risk/Actual: Related Risk Factors infection/disease process;sensory impairment;tissue perfusion impaired   Signs and Symptoms (Skin Integrity Impairment) edema       Goal: Skin Integrity/Wound Healing  Outcome: Ongoing (interventions implemented as appropriate)    12/15/17 1633   Skin Integrity Impairment, Risk/Actual (Adult)   Skin Integrity/Wound Healing making progress toward outcome         Problem: Pressure Ulcer Risk (Alejandro Scale) (Adult,Obstetrics,Pediatric)  Goal: Identify Related Risk Factors and Signs and Symptoms  Outcome: Ongoing (interventions implemented as appropriate)    12/14/17 1655   Pressure Ulcer Risk (Alejandro Scale)   Related Risk Factors (Pressure Ulcer Risk (Alejandro Scale)) hospitalization prolonged;mobility impaired       Goal: Skin Integrity  Outcome:  Ongoing (interventions implemented as appropriate)    12/15/17 1633   Pressure Ulcer Risk (Alejandro Scale) (Adult,Obstetrics,Pediatric)   Skin Integrity making progress toward outcome         Problem: Infection, Risk/Actual (Adult)  Goal: Identify Related Risk Factors and Signs and Symptoms  Outcome: Ongoing (interventions implemented as appropriate)    12/14/17 1655   Infection, Risk/Actual   Infection, Risk/Actual: Related Risk Factors chronic illness/condition;skin integrity impairment   Signs and Symptoms (Infection, Risk/Actual) weakness       Goal: Infection Prevention/Resolution  Outcome: Ongoing (interventions implemented as appropriate)    12/15/17 1633   Infection, Risk/Actual (Adult)   Infection Prevention/Resolution making progress toward outcome         Problem: Pressure Ulcer (Adult)  Goal: Signs and Symptoms of Listed Potential Problems Will be Absent or Manageable (Pressure Ulcer)  Outcome: Ongoing (interventions implemented as appropriate)    12/15/17 0735   Pressure Ulcer   Problems Assessed (Pressure Ulcer) all   Problems Present (Pressure Ulcer) wound progression/extension         Problem: Self-Care Deficit (Adult,Obstetrics,Pediatric)  Goal: Identify Related Risk Factors and Signs and Symptoms  Outcome: Ongoing (interventions implemented as appropriate)    12/14/17 1655   Self-Care Deficit   Self-Care Deficit: Related Risk Factors activity intolerance;immobility   Signs and Symptoms (Self-Care Deficit) weakness, paresis, paralysis       Goal: Improved Ability to Perform BADL and IADL  Outcome: Ongoing (interventions implemented as appropriate)    12/15/17 1633   Self-Care Deficit (Adult,Obstetrics,Pediatric)   Improved Ability to Perform BADL and IADL making progress toward outcome         Problem: Confusion, Acute (Adult)  Goal: Identify Related Risk Factors and Signs and Symptoms  Outcome: Ongoing (interventions implemented as appropriate)    12/10/17 0146 12/14/17 1655   Confusion, Acute    Related Risk Factors (Acute Confusion) --  infection;mobility decreased   Signs and Symptoms (Acute Confusion) (none) --        Goal: Cognitive/Functional Impairments Minimized  Outcome: Ongoing (interventions implemented as appropriate)    12/14/17 1655   Confusion, Acute (Adult)   Cognitive/Functional Impairments Minimized making progress toward outcome       Goal: Safety  Outcome: Ongoing (interventions implemented as appropriate)    12/11/17 0054   Confusion, Acute (Adult)   Safety making progress toward outcome         Problem: Colostomy (Adult)  Goal: Signs and Symptoms of Listed Potential Problems Will be Absent or Manageable (Colostomy)  Outcome: Ongoing (interventions implemented as appropriate)    12/14/17 1655   Colostomy   Problems Assessed (Colostomy) all   Problems Present (Colostomy) none

## 2017-12-16 NOTE — PROGRESS NOTES
"  I have personally seen and examined the patient today and discussed overnight interval progress and pertinent issues with nursing staff.    Subjective:    Resting comfortably this morning with improving CRP level down to 3.69 from 4.14 with a normal white count.  No fever or diarrhea reported.    History taken from: patient chart RN      Vital Signs    BP 96/67  Pulse 80  Temp 98.6 °F (37 °C)  Resp 18  Ht 182.9 cm (72\")  Wt 113 kg (249 lb 11.2 oz)  SpO2 96%  BMI 33.87 kg/m2    Temp:  [97.5 °F (36.4 °C)-98.6 °F (37 °C)] 98.6 °F (37 °C)      Intake/Output Summary (Last 24 hours) at 12/16/17 1058  Last data filed at 12/16/17 0400   Gross per 24 hour   Intake             1800 ml   Output             2725 ml   Net             -925 ml     Intake & Output (last 3 days)       12/13 0701 - 12/14 0700 12/14 0701 - 12/15 0700 12/15 0701 - 12/16 0700 12/16 0701 - 12/17 0700    P.O. 1440 1320 1560     IV Piggyback 500 700 700     Total Intake(mL/kg) 1940 (17.1) 2020 (17.8) 2260 (20)     Urine (mL/kg/hr) 3500 (1.3) 2000 (0.7) 2200 (0.8)     Stool 400 (0.1) 475 (0.2) 525 (0.2)     Total Output 3900 2475 1401      Net -1962 -615 -329                    Physical exam:    General Appearance:    Alert, cooperative, in no acute distress   Head:    Normocephalic, without obvious abnormality, atraumatic   Eyes:            Lids and lashes normal, conjunctivae and sclerae normal, no   icterus, no pallor, corneas clear, PERRLA   Ears:    Ears appear intact with no abnormalities noted   Throat:   No oral lesions, no thrush, oral mucosa moist   Neck:   No adenopathy, supple, trachea midline, no thyromegaly, no   carotid bruit, no JVD   Back:     No tenderness to percussion or palpation, range of motion   normal   Lungs:     Clear to auscultation,respirations regular, even and unlabored. No wheezing, no ronchi and no crackles.    Heart:    Regular rhythm and normal rate, normal S1 and S2, no            murmur, no gallop, no rub, no " click   Chest Wall:    No abnormalities observed   Abdomen:     Normal bowel sounds, no masses, no organomegaly, soft        non-tender, non-distended, no guarding, no rebound                tenderness   Rectal:     Deferred   Extremities:   Moves all extremities well, no edema, no cyanosis, no             redness   Pulses:   Pulses palpable and equal bilaterally   Skin:  Right upper arm PICC site red    Lymph nodes:   No palpable adenopathy   Neurologic:   Awake, alert and oriented x 3. Following commands.           Results:      Results from last 7 days  Lab Units 12/16/17  0221 12/15/17  0117 12/14/17  0100 12/13/17  0051 12/12/17  0024 12/11/17  0029 12/10/17  0121   WBC 10*3/mm3 11.56 11.58 10.13 9.26 13.61* 11.03 11.98     Lab Results   Component Value Date    NEUTROABS 7.79 (H) 12/16/2017         Results from last 7 days  Lab Units 12/16/17  0221   CREATININE mg/dL 0.65         Results from last 7 days  Lab Units 12/16/17  0221 12/15/17  0117 12/14/17  0100   CRP mg/dL 3.69* 4.14* 4.47*       Imaging Results (last 24 hours)     ** No results found for the last 24 hours. **            Results Review:    I have personally reviewed laboratory data, culture results, radiology studies and antimicrobial therapy.    Hospital Medications (active)       Dose Frequency Start End    acetaminophen (TYLENOL) tablet 650 mg 650 mg Every 6 Hours PRN 12/5/2017     Sig - Route: Take 2 tablets by mouth Every 6 (Six) Hours As Needed for Mild Pain  or Fever. - Oral    albuterol (PROVENTIL) nebulizer solution 0.083% 2.5 mg/3mL 2.5 mg Every 6 Hours PRN 12/6/2017     Sig - Route: Take 2.5 mg by nebulization Every 6 (Six) Hours As Needed for Wheezing or Shortness of Air. - Nebulization    ARIPiprazole (ABILIFY) tablet 10 mg 10 mg Nightly 12/6/2017     Sig - Route: Take 1 tablet by mouth Every Night. - Oral    aztreonam (AZACTAM) 2 g/100 mL 0.9% NS (mbp) 2 g Every 8 Hours 12/6/2017 12/16/2017    Sig - Route: Infuse 100 mL into a  "venous catheter Every 8 (Eight) Hours. - Intravenous    DULoxetine (CYMBALTA) DR capsule 60 mg 60 mg 2 Times Daily 12/6/2017     Sig - Route: Take 1 capsule by mouth 2 (Two) Times a Day. - Oral    famotidine (PEPCID) injection 20 mg 20 mg Every 12 Hours Scheduled 12/5/2017     Sig - Route: Infuse 2 mL into a venous catheter Every 12 (Twelve) Hours. - Intravenous    gabapentin (NEURONTIN) capsule 600 mg 600 mg Every 8 Hours Scheduled 12/6/2017     Sig - Route: Take 2 capsules by mouth Every 8 (Eight) Hours. - Oral    HYDROmorphone (DILAUDID) 1 MG/ML injection  - ADS Override Pull   12/6/2017 12/7/2017    Notes to Pharmacy: Created by cabinet override    ipratropium-albuterol (DUO-NEB) nebulizer solution 3 mL 3 mL 4 Times Daily - RT 12/5/2017     Sig - Route: Take 3 mL by nebulization 4 (Four) Times a Day. - Nebulization    Magnesium Sulfate 2 gram Bolus, followed by 8 gram infusion (total Mg dose 10 grams)- Mg less than or equal to 1mg/dL 2 g As Needed 12/6/2017     Sig - Route: Infuse 50 mL into a venous catheter As Needed (Mg less than or equal to 1mg/dL). - Intravenous    Cosign for Ordering: Accepted by Inocencia Montano MD on 12/6/2017  8:10 AM    Linked Group 1:  \"Or\" Linked Group Details        magnesium sulfate 4 gram infusion- Mg 1.6-1.9 mg/dL 4 g As Needed 12/6/2017     Sig - Route: Infuse 100 mL into a venous catheter As Needed (Mg 1.6-1.9 mg/dL). - Intravenous    Cosign for Ordering: Accepted by Inocencia Montano MD on 12/6/2017  8:10 AM    Linked Group 1:  \"Or\" Linked Group Details        magnesium sulfate 4 gram infusion- Mg 1.6-1.9 mg/dL 4 g Once 12/6/2017 12/6/2017    Sig - Route: Infuse 100 mL into a venous catheter 1 (One) Time. - Intravenous    Magnesium Sulfate 6 gram Infusion (2 gm x 3) -Mg 1.1 -1.5 mg/dL 2 g As Needed 12/6/2017     Sig - Route: Infuse 50 mL into a venous catheter As Needed (Mg 1.1 -1.5 mg/dL). - Intravenous    Cosign for Ordering: Accepted by Inocencia Montano MD on " "12/6/2017  8:10 AM    Linked Group 1:  \"Or\" Linked Group Details        methenamine (HIPREX) tablet 1 g 1 g 2 Times Daily 12/6/2017     Sig - Route: Take 1 tablet by mouth 2 (Two) Times a Day. - Oral    metroNIDAZOLE (FLAGYL) IVPB 500 mg 500 mg Every 8 Hours 12/5/2017 12/15/2017    Sig - Route: Infuse 100 mL into a venous catheter Every 8 (Eight) Hours. - Intravenous    nitroglycerin (NITROSTAT) SL tablet 0.4 mg 0.4 mg Every 5 Minutes PRN 12/5/2017     Sig - Route: Place 1 tablet under the tongue Every 5 (Five) Minutes As Needed for Chest Pain (if systolic BP greater than 100 mm/Hg.). - Sublingual    Cosign for Ordering: Accepted by Inocencia Montano MD on 12/5/2017  6:13 PM    nystatin (MYCOSTATIN) powder 1 application 1 application 3 Times Daily PRN 12/6/2017     Sig - Route: Apply 1 application topically 3 (Three) Times a Day As Needed (skin). - Topical    pantoprazole (PROTONIX) EC tablet 40 mg 40 mg Every Morning 12/6/2017     Sig - Route: Take 1 tablet by mouth Every Morning. - Oral    potassium chloride (K-DUR,KLOR-CON) CR tablet 40 mEq 40 mEq Every 4 Hours 12/6/2017 12/6/2017    Sig - Route: Take 2 tablets by mouth Every 4 (Four) Hours. - Oral    potassium chloride (KLOR-CON) packet 40 mEq 40 mEq As Needed 12/6/2017     Sig - Route: Take 40 mEq by mouth As Needed (potassium replacement, see admin instructions). - Oral    Cosign for Ordering: Accepted by Inocencia Montano MD on 12/6/2017  8:10 AM    Linked Group 2:  \"Or\" Linked Group Details        potassium chloride (MICRO-K) CR capsule 40 mEq 40 mEq As Needed 12/6/2017     Sig - Route: Take 4 capsules by mouth As Needed (potassium replacement.  see admin instructions). - Oral    Cosign for Ordering: Accepted by Inocencia Montano MD on 12/6/2017  8:10 AM    Linked Group 2:  \"Or\" Linked Group Details        potassium chloride 10 mEq in 100 mL IVPB 10 mEq Every 1 Hour PRN 12/6/2017     Sig - Route: Infuse 100 mL into a venous catheter Every 1 (One) Hour " "As Needed (potassium protocol PERIPHERAL - see admin instructions). - Intravenous    Cosign for Ordering: Accepted by Inocencia Montano MD on 12/6/2017  8:10 AM    Linked Group 2:  \"Or\" Linked Group Details        sodium chloride 0.9 % flush 1-10 mL 1-10 mL As Needed 12/5/2017     Sig - Route: Infuse 1-10 mL into a venous catheter As Needed for Line Care. - Intravenous    Cosign for Ordering: Accepted by Inocencia Montano MD on 12/5/2017  6:13 PM    sodium chloride 0.9 % infusion 100 mL/hr Continuous 12/5/2017     Sig - Route: Infuse 100 mL/hr into a venous catheter Continuous. - Intravenous    sodium hypochlorite (DAKIN'S 1/4 STRENGTH) 0.125 % topical solution 0.125% solution  3 Times Daily 12/6/2017     Sig - Route: Apply  topically 3 (Three) Times a Day. - Topical    Cosign for Ordering: Accepted by Sung Blair MD on 12/6/2017  4:01 PM    sucralfate (CARAFATE) tablet 1 g 1 g 4 Times Daily Before Meals & Nightly 12/6/2017     Sig - Route: Take 1 tablet by mouth 4 (Four) Times a Day Before Meals & at Bedtime. - Oral    topiramate (TOPAMAX) tablet 100 mg 100 mg 3 Times Daily 12/6/2017     Sig - Route: Take 1 tablet by mouth 3 (Three) Times a Day. - Oral    vancomycin (VANCOCIN) 2,000 mg in sodium chloride 0.9 % 500 mL IVPB 2,000 mg Every 12 Hours 12/6/2017 12/20/2017    Sig - Route: Infuse 2,000 mg into a venous catheter Every 12 (Twelve) Hours. - Intravenous    zinc oxide 20 % ointment  3 Times Daily 12/6/2017     Sig - Route: Apply  topically 3 (Three) Times a Day. - Topical    Pharmacy to dose vancomycin (Discontinued)  Continuous PRN 12/5/2017 12/6/2017    Sig - Route: Continuous As Needed for Consult. - Does not apply    Reason for Discontinue: Duplicate order    sodium hypochlorite (DAKIN'S 1/4 STRENGTH) 0.125 % topical solution 0.125% solution (Discontinued)  2 Times Daily 12/6/2017 12/6/2017    Sig - Route: Apply  topically 2 (Two) Times a Day. - Topical    Cosign for Ordering: Accepted by Sung" Juan Carlos Blair MD on 12/6/2017  4:01 PM    vancomycin (VANCOCIN) 2,000 mg in sodium chloride 0.9 % 500 mL IVPB (Discontinued) 2,000 mg Every 24 Hours 12/6/2017 12/6/2017    Sig - Route: Infuse 2,000 mg into a venous catheter Daily. - Intravenous            Cultures:    Blood Culture   Date Value Ref Range Status   12/05/2017 No growth at 24 hours  Preliminary   12/05/2017 No growth at 24 hours  Preliminary   12/05/2017 No growth at 24 hours  Preliminary     Urine Culture   Date Value Ref Range Status   12/05/2017 Culture in progress  Preliminary     Wound Culture   Date Value Ref Range Status   12/05/2017 (A)  Preliminary    Light growth (2+) Gram Negative Bacilli, Morphology consistent with Escherichia / Citrobacter           Assessment/Plan     ASSESSMENT:    1.  Severe sepsis with encephalopathy  2.  Aspiration pneumonia  3.  Decubitus ulcers    PLAN:    Resting comfortably this morning with improving CRP level down to 3.69 from 4.14 with a normal white count.  No fever or diarrhea reported.    Still awaiting possible transfer to  when bed available.    Recommend to continue broad-spectrum coverage with vancomycin, Azactam and Flagyl.    We will continue to follow closely.    Patient's findings and recommendations were discussed with patient and nursing staff    Code Status: Full Code     Susan Royal PA-C  12/16/17  10:58 AM

## 2017-12-17 LAB
ALBUMIN SERPL-MCNC: 3.5 G/DL (ref 3.5–5)
ALBUMIN/GLOB SERPL: 0.9 G/DL (ref 1.5–2.5)
ALP SERPL-CCNC: 69 U/L (ref 40–129)
ALT SERPL W P-5'-P-CCNC: 26 U/L (ref 10–44)
ANION GAP SERPL CALCULATED.3IONS-SCNC: 10.6 MMOL/L (ref 3.6–11.2)
AST SERPL-CCNC: 29 U/L (ref 10–34)
BASOPHILS # BLD AUTO: 0.04 10*3/MM3 (ref 0–0.3)
BASOPHILS NFR BLD AUTO: 0.4 % (ref 0–2)
BILIRUB SERPL-MCNC: 0.2 MG/DL (ref 0.2–1.8)
BUN BLD-MCNC: 17 MG/DL (ref 7–21)
BUN/CREAT SERPL: 27.4 (ref 7–25)
CALCIUM SPEC-SCNC: 8.5 MG/DL (ref 7.7–10)
CHLORIDE SERPL-SCNC: 106 MMOL/L (ref 99–112)
CO2 SERPL-SCNC: 18.4 MMOL/L (ref 24.3–31.9)
CREAT BLD-MCNC: 0.62 MG/DL (ref 0.43–1.29)
CRP SERPL-MCNC: 4.05 MG/DL (ref 0–0.99)
DEPRECATED RDW RBC AUTO: 60.1 FL (ref 37–54)
EOSINOPHIL # BLD AUTO: 0.28 10*3/MM3 (ref 0–0.7)
EOSINOPHIL NFR BLD AUTO: 2.5 % (ref 0–5)
ERYTHROCYTE [DISTWIDTH] IN BLOOD BY AUTOMATED COUNT: 20 % (ref 11.5–14.5)
GFR SERPL CREATININE-BSD FRML MDRD: 144 ML/MIN/1.73
GLOBULIN UR ELPH-MCNC: 3.8 GM/DL
GLUCOSE BLD-MCNC: 93 MG/DL (ref 70–110)
HCT VFR BLD AUTO: 33.1 % (ref 42–52)
HGB BLD-MCNC: 9.6 G/DL (ref 14–18)
IMM GRANULOCYTES # BLD: 0.03 10*3/MM3 (ref 0–0.03)
IMM GRANULOCYTES NFR BLD: 0.3 % (ref 0–0.5)
LYMPHOCYTES # BLD AUTO: 2.97 10*3/MM3 (ref 1–3)
LYMPHOCYTES NFR BLD AUTO: 26.6 % (ref 21–51)
MCH RBC QN AUTO: 24.1 PG (ref 27–33)
MCHC RBC AUTO-ENTMCNC: 29 G/DL (ref 33–37)
MCV RBC AUTO: 83 FL (ref 80–94)
MONOCYTES # BLD AUTO: 0.4 10*3/MM3 (ref 0.1–0.9)
MONOCYTES NFR BLD AUTO: 3.6 % (ref 0–10)
NEUTROPHILS # BLD AUTO: 7.44 10*3/MM3 (ref 1.4–6.5)
NEUTROPHILS NFR BLD AUTO: 66.6 % (ref 30–70)
NRBC BLD MANUAL-RTO: 0 /100 WBC (ref 0–0)
OSMOLALITY SERPL CALC.SUM OF ELEC: 271.3 MOSM/KG (ref 273–305)
PLATELET # BLD AUTO: 602 10*3/MM3 (ref 130–400)
PMV BLD AUTO: 9.4 FL (ref 6–10)
POTASSIUM BLD-SCNC: 4.9 MMOL/L (ref 3.5–5.3)
PROT SERPL-MCNC: 7.3 G/DL (ref 6–8)
RBC # BLD AUTO: 3.99 10*6/MM3 (ref 4.7–6.1)
SODIUM BLD-SCNC: 135 MMOL/L (ref 135–153)
VANCOMYCIN TROUGH SERPL-MCNC: 20 MCG/ML (ref 5–15)
WBC NRBC COR # BLD: 11.16 10*3/MM3 (ref 4.5–12.5)

## 2017-12-17 PROCEDURE — 25010000002 VANCOMYCIN PER 500 MG

## 2017-12-17 PROCEDURE — 80202 ASSAY OF VANCOMYCIN: CPT | Performed by: INTERNAL MEDICINE

## 2017-12-17 PROCEDURE — 80053 COMPREHEN METABOLIC PANEL: CPT | Performed by: INTERNAL MEDICINE

## 2017-12-17 PROCEDURE — 94799 UNLISTED PULMONARY SVC/PX: CPT

## 2017-12-17 PROCEDURE — 86140 C-REACTIVE PROTEIN: CPT | Performed by: INTERNAL MEDICINE

## 2017-12-17 PROCEDURE — 99232 SBSQ HOSP IP/OBS MODERATE 35: CPT | Performed by: INTERNAL MEDICINE

## 2017-12-17 PROCEDURE — 85025 COMPLETE CBC W/AUTO DIFF WBC: CPT | Performed by: INTERNAL MEDICINE

## 2017-12-17 RX ORDER — VANCOMYCIN/0.9 % SOD CHLORIDE 2 G/500 ML
2000 PLASTIC BAG, INJECTION (ML) INTRAVENOUS EVERY 12 HOURS SCHEDULED
Start: 2017-12-17 | End: 2017-12-20

## 2017-12-17 RX ORDER — METRONIDAZOLE 500 MG/100ML
500 INJECTION, SOLUTION INTRAVENOUS EVERY 8 HOURS
Qty: 900 ML | Refills: 0
Start: 2017-12-17 | End: 2017-12-20

## 2017-12-17 RX ORDER — DIPHENHYDRAMINE, LIDOCAINE, NYSTATIN
5 KIT ORAL 4 TIMES DAILY
Status: ON HOLD
Start: 2017-12-18 | End: 2018-07-28

## 2017-12-17 RX ADMIN — AZTREONAM 2 G: 2 INJECTION, POWDER, FOR SOLUTION INTRAMUSCULAR; INTRAVENOUS at 14:17

## 2017-12-17 RX ADMIN — SUCRALFATE 1 G: 1 TABLET ORAL at 11:55

## 2017-12-17 RX ADMIN — METRONIDAZOLE 500 MG: 500 INJECTION, SOLUTION INTRAVENOUS at 21:35

## 2017-12-17 RX ADMIN — TOPIRAMATE 100 MG: 100 TABLET, FILM COATED ORAL at 17:14

## 2017-12-17 RX ADMIN — SODIUM HYPOCHLORITE: 1.25 SOLUTION TOPICAL at 17:14

## 2017-12-17 RX ADMIN — AZTREONAM 2 G: 2 INJECTION, POWDER, FOR SOLUTION INTRAMUSCULAR; INTRAVENOUS at 07:00

## 2017-12-17 RX ADMIN — VANCOMYCIN HYDROCHLORIDE 2000 MG: 5 INJECTION, POWDER, LYOPHILIZED, FOR SOLUTION INTRAVENOUS at 03:25

## 2017-12-17 RX ADMIN — DULOXETINE HYDROCHLORIDE 60 MG: 60 CAPSULE, DELAYED RELEASE ORAL at 17:14

## 2017-12-17 RX ADMIN — ZINC OXIDE: 200 OINTMENT TOPICAL at 17:15

## 2017-12-17 RX ADMIN — SUCRALFATE 1 G: 1 TABLET ORAL at 17:14

## 2017-12-17 RX ADMIN — METHENAMINE HIPPURATE 1 G: 1 TABLET ORAL at 17:14

## 2017-12-17 RX ADMIN — TOPIRAMATE 100 MG: 100 TABLET, FILM COATED ORAL at 21:35

## 2017-12-17 RX ADMIN — METRONIDAZOLE 500 MG: 500 INJECTION, SOLUTION INTRAVENOUS at 13:14

## 2017-12-17 RX ADMIN — SODIUM HYPOCHLORITE: 1.25 SOLUTION TOPICAL at 21:35

## 2017-12-17 RX ADMIN — SUCRALFATE 1 G: 1 TABLET ORAL at 08:41

## 2017-12-17 RX ADMIN — GABAPENTIN 800 MG: 400 CAPSULE ORAL at 13:14

## 2017-12-17 RX ADMIN — GABAPENTIN 800 MG: 400 CAPSULE ORAL at 06:01

## 2017-12-17 RX ADMIN — Medication 10 ML: at 08:41

## 2017-12-17 RX ADMIN — Medication 10 ML: at 21:35

## 2017-12-17 RX ADMIN — AZTREONAM 2 G: 2 INJECTION, POWDER, FOR SOLUTION INTRAMUSCULAR; INTRAVENOUS at 22:39

## 2017-12-17 RX ADMIN — DULOXETINE HYDROCHLORIDE 60 MG: 60 CAPSULE, DELAYED RELEASE ORAL at 08:41

## 2017-12-17 RX ADMIN — GABAPENTIN 800 MG: 400 CAPSULE ORAL at 21:35

## 2017-12-17 RX ADMIN — METRONIDAZOLE 500 MG: 500 INJECTION, SOLUTION INTRAVENOUS at 06:01

## 2017-12-17 RX ADMIN — TOPIRAMATE 100 MG: 100 TABLET, FILM COATED ORAL at 08:41

## 2017-12-17 RX ADMIN — ZINC OXIDE: 200 OINTMENT TOPICAL at 21:35

## 2017-12-17 RX ADMIN — ARIPIPRAZOLE 10 MG: 10 TABLET ORAL at 21:35

## 2017-12-17 RX ADMIN — PANTOPRAZOLE SODIUM 40 MG: 40 TABLET, DELAYED RELEASE ORAL at 06:01

## 2017-12-17 RX ADMIN — SUCRALFATE 1 G: 1 TABLET ORAL at 21:35

## 2017-12-17 RX ADMIN — ZINC OXIDE: 200 OINTMENT TOPICAL at 08:41

## 2017-12-17 RX ADMIN — METHENAMINE HIPPURATE 1 G: 1 TABLET ORAL at 08:41

## 2017-12-17 RX ADMIN — VANCOMYCIN HYDROCHLORIDE 2000 MG: 5 INJECTION, POWDER, LYOPHILIZED, FOR SOLUTION INTRAVENOUS at 15:31

## 2017-12-17 RX ADMIN — SODIUM HYPOCHLORITE: 1.25 SOLUTION TOPICAL at 08:41

## 2017-12-17 NOTE — PROGRESS NOTES
Subjective     History:   Ken Krueger is a 42 y.o. male admitted on 12/5/2017 secondary to Sepsis     Procedures:   12/11/17: LUE Powerglide insertion    Patient seen and examined with SHAHNAZ Churchill. Awake and alert. Currently resting comfortably eating lunch. Continues to report some muscle spasms. Denies CP, dyspnea or palpitations. No acute events overnight per RN.     History taken from: patient, chart, and RN.      Objective     Vital Signs  Temp:  [97.7 °F (36.5 °C)-98.8 °F (37.1 °C)] 97.8 °F (36.6 °C)  Heart Rate:  [65-89] 86  Resp:  [18-20] 20  BP: (107-142)/(63-82) 107/68    Intake/Output Summary (Last 24 hours) at 12/17/17 1311  Last data filed at 12/17/17 0405   Gross per 24 hour   Intake             1620 ml   Output              850 ml   Net              770 ml         Physical Exam:  General:    Awake, alert, in no acute distress   Heart:      Normal S1 and S2. Regular rate and rhythm. No significant murmur, rubs or gallops appreciated.   Lungs:     Respirations regular, even and unlabored. Lungs clear to auscultation B/L. No wheezes, rales or rhonchi.     Abdomen:   Soft and nontender. No guarding, rebound tenderness or  organomegaly noted. Bowel sounds present x 4. (+) ileostomy and colostomy   Extremities:  Left AKA. No edema in RLE.      Results Review:      Results from last 7 days  Lab Units 12/17/17  0110 12/16/17  0221 12/15/17  0117 12/14/17  0100 12/13/17  0051 12/12/17  0024 12/11/17  0029   WBC 10*3/mm3 11.16 11.56 11.58 10.13 9.26 13.61* 11.03   HEMOGLOBIN g/dL 9.6* 9.4* 9.7* 9.5* 9.3* 9.8* 9.3*   PLATELETS 10*3/mm3 602* 614* 688* 554* 534* 598* 537*       Results from last 7 days  Lab Units 12/17/17  0110 12/16/17  0221 12/15/17  1331 12/15/17  0401 12/14/17  0100 12/13/17  0051 12/12/17  0024 12/11/17  0029   SODIUM mmol/L 135 137  --  138 136 136 135 136   POTASSIUM mmol/L 4.9 4.2 3.8 3.3* 3.9 3.9 3.7 4.1   CHLORIDE mmol/L 106 107  --  108 108 108 108 107   CO2 mmol/L 18.4* 20.3*  --  19.9*  19.1* 19.1* 19.4* 19.1*   BUN mg/dL 17 14  --  12 14 10 10 12   CREATININE mg/dL 0.62 0.65  --  0.61 0.57 0.59 0.72 0.58   CALCIUM mg/dL 8.5 8.6  --  9.5 8.5 8.6 9.1 8.7   GLUCOSE mg/dL 93 97  --  92 89 93 115* 89       Results from last 7 days  Lab Units 12/17/17  0110 12/12/17  0024   BILIRUBIN mg/dL 0.2 0.2   ALK PHOS U/L 69 74   AST (SGOT) U/L 29 24   ALT (SGPT) U/L 26 22       Results from last 7 days  Lab Units 12/16/17  0221 12/15/17  1331 12/11/17  0029   MAGNESIUM mg/dL 2.5 1.8 1.8               Imaging Results (last 24 hours)     ** No results found for the last 24 hours. **            Medications:    ARIPiprazole 10 mg Oral Nightly   aztreonam 2 g Intravenous Q8H   DULoxetine 60 mg Oral BID   gabapentin 800 mg Oral Q8H   methenamine 1 g Oral BID   metroNIDAZOLE 500 mg Intravenous Q8H   nystatin susp + lidocaine viscous 5 mL Swish & Swallow 4x Daily   pantoprazole 40 mg Oral QAM   sodium chloride 10 mL Intracatheter Q12H   sodium hypochlorite  Topical TID   sucralfate 1 g Oral 4x Daily AC & at Bedtime   topiramate 100 mg Oral TID   vancomycin 2,000 mg Intravenous Q12H   zinc oxide  Topical TID              Assessment/Plan   Severe sepsis: Likely 2/2 infected decubitus ulcer and and UTI. PICC has been removed with concern for possible surrounding erythema. Improving on broad spectrum IV antibiotics. Blood cultures with NGTD. Urine culture revealing Klebsiella.   Wound culture revealing E coli and Group B strep. Cont broad spectrum IV antibiotics per ID and repeat labs in the AM. ID input appreciated.     Decubitus ulcers/bilateral ischial wounds with underlying osteomyelitis: Cont broad spectrum IV antibiotics including Vanc, Azactam and Flagyl. Discussed with ID and general surgery who recommended referral to tertiary care center for possible pelvic debridement. I spoke to hospitalist and orthopedic surgeon on call at [facility] on 12/11/17 and the hospitalist on call, Dr. Soto graciously agreed to accept the  patient in transfer. Orthopedic surgery recommended plastic surgery evaluation upon admission to . Pt remains on the waiting list with no beds currently available. Will cont current management for now. ID and surgery input appreciated.     Metabolic encephalopathy: Likely 2/2 above. Now resolved. Cont to monitor.     JAKE: Now resolved.  Repeat labs in the AM.     Normocytic anemia: Stable. Cont to monitor. Repeat CBC in the AM.     Disposition: Awaiting transfer to [facility].     Pt is at high risk 2/2 severe sepsis, UTI, decubitus ulcers, ischial wounds, osteomyelitis, encephalopathy and paraplegia.       Javier Moscoso,   12/17/17  1:11 PM

## 2017-12-17 NOTE — PLAN OF CARE
Problem: Patient Care Overview (Adult)  Goal: Plan of Care Review  Outcome: Ongoing (interventions implemented as appropriate)    12/15/17 0630 12/16/17 0915   Coping/Psychosocial Response Interventions   Plan Of Care Reviewed With --  patient   Patient Care Overview   Progress no change --        Goal: Adult Individualization and Mutuality  Outcome: Ongoing (interventions implemented as appropriate)    Problem: Fall Risk (Adult)  Goal: Identify Related Risk Factors and Signs and Symptoms  Outcome: Ongoing (interventions implemented as appropriate)    12/14/17 1655   Fall Risk   Fall Risk: Related Risk Factors polypharmacy;environment unfamiliar   Fall Risk: Signs and Symptoms presence of risk factors       Goal: Absence of Falls  Outcome: Ongoing (interventions implemented as appropriate)    12/16/17 1053   Fall Risk (Adult)   Absence of Falls making progress toward outcome         Problem: Skin Integrity Impairment, Risk/Actual (Adult)  Goal: Identify Related Risk Factors and Signs and Symptoms  Outcome: Ongoing (interventions implemented as appropriate)    12/14/17 1655   Skin Integrity Impairment, Risk/Actual   Skin Integrity Impairment, Risk/Actual: Related Risk Factors infection/disease process;sensory impairment;tissue perfusion impaired   Signs and Symptoms (Skin Integrity Impairment) edema       Goal: Skin Integrity/Wound Healing  Outcome: Ongoing (interventions implemented as appropriate)    12/16/17 1053   Skin Integrity Impairment, Risk/Actual (Adult)   Skin Integrity/Wound Healing making progress toward outcome         Problem: Pressure Ulcer Risk (Alejandro Scale) (Adult,Obstetrics,Pediatric)  Goal: Identify Related Risk Factors and Signs and Symptoms  Outcome: Ongoing (interventions implemented as appropriate)    12/14/17 1655   Pressure Ulcer Risk (Alejandro Scale)   Related Risk Factors (Pressure Ulcer Risk (Alejandro Scale)) hospitalization prolonged;mobility impaired         Problem: Infection, Risk/Actual  (Adult)  Goal: Identify Related Risk Factors and Signs and Symptoms  Outcome: Ongoing (interventions implemented as appropriate)    12/14/17 1655   Infection, Risk/Actual   Infection, Risk/Actual: Related Risk Factors chronic illness/condition;skin integrity impairment   Signs and Symptoms (Infection, Risk/Actual) weakness       Goal: Infection Prevention/Resolution  Outcome: Ongoing (interventions implemented as appropriate)    12/16/17 1053   Infection, Risk/Actual (Adult)   Infection Prevention/Resolution making progress toward outcome         Problem: Pressure Ulcer (Adult)  Goal: Signs and Symptoms of Listed Potential Problems Will be Absent or Manageable (Pressure Ulcer)  Outcome: Ongoing (interventions implemented as appropriate)    12/15/17 0735   Pressure Ulcer   Problems Assessed (Pressure Ulcer) all   Problems Present (Pressure Ulcer) wound progression/extension         Problem: Self-Care Deficit (Adult,Obstetrics,Pediatric)  Goal: Identify Related Risk Factors and Signs and Symptoms  Outcome: Ongoing (interventions implemented as appropriate)    12/14/17 1655   Self-Care Deficit   Self-Care Deficit: Related Risk Factors activity intolerance;immobility   Signs and Symptoms (Self-Care Deficit) weakness, paresis, paralysis       Goal: Improved Ability to Perform BADL and IADL  Outcome: Ongoing (interventions implemented as appropriate)    12/16/17 1053   Self-Care Deficit (Adult,Obstetrics,Pediatric)   Improved Ability to Perform BADL and IADL making progress toward outcome         Problem: Confusion, Acute (Adult)  Goal: Identify Related Risk Factors and Signs and Symptoms  Outcome: Ongoing (interventions implemented as appropriate)    12/10/17 0146 12/14/17 1655   Confusion, Acute   Related Risk Factors (Acute Confusion) --  infection;mobility decreased   Signs and Symptoms (Acute Confusion) (none) --        Goal: Cognitive/Functional Impairments Minimized  Outcome: Ongoing (interventions implemented as  appropriate)    12/14/17 1655   Confusion, Acute (Adult)   Cognitive/Functional Impairments Minimized making progress toward outcome       Goal: Safety  Outcome: Ongoing (interventions implemented as appropriate)    12/11/17 0054   Confusion, Acute (Adult)   Safety making progress toward outcome         Problem: Colostomy (Adult)  Goal: Signs and Symptoms of Listed Potential Problems Will be Absent or Manageable (Colostomy)  Outcome: Ongoing (interventions implemented as appropriate)    12/14/17 1655   Colostomy   Problems Assessed (Colostomy) all   Problems Present (Colostomy) none

## 2017-12-17 NOTE — PLAN OF CARE
Problem: Patient Care Overview (Adult)  Goal: Plan of Care Review  Outcome: Ongoing (interventions implemented as appropriate)    12/15/17 0630 12/17/17 0842   Coping/Psychosocial Response Interventions   Plan Of Care Reviewed With --  patient   Patient Care Overview   Progress no change --        Goal: Adult Individualization and Mutuality  Outcome: Ongoing (interventions implemented as appropriate)  Goal: Discharge Needs Assessment  Outcome: Ongoing (interventions implemented as appropriate)    Problem: Fall Risk (Adult)  Goal: Identify Related Risk Factors and Signs and Symptoms  Outcome: Ongoing (interventions implemented as appropriate)    12/14/17 1655   Fall Risk   Fall Risk: Related Risk Factors polypharmacy;environment unfamiliar   Fall Risk: Signs and Symptoms presence of risk factors       Goal: Absence of Falls  Outcome: Ongoing (interventions implemented as appropriate)    12/17/17 0946   Fall Risk (Adult)   Absence of Falls making progress toward outcome         Problem: Skin Integrity Impairment, Risk/Actual (Adult)  Goal: Identify Related Risk Factors and Signs and Symptoms  Outcome: Ongoing (interventions implemented as appropriate)    12/14/17 1655   Skin Integrity Impairment, Risk/Actual   Skin Integrity Impairment, Risk/Actual: Related Risk Factors infection/disease process;sensory impairment;tissue perfusion impaired   Signs and Symptoms (Skin Integrity Impairment) edema       Goal: Skin Integrity/Wound Healing  Outcome: Ongoing (interventions implemented as appropriate)    12/17/17 0946   Skin Integrity Impairment, Risk/Actual (Adult)   Skin Integrity/Wound Healing making progress toward outcome         Problem: Pressure Ulcer Risk (Alejandro Scale) (Adult,Obstetrics,Pediatric)  Goal: Identify Related Risk Factors and Signs and Symptoms  Outcome: Ongoing (interventions implemented as appropriate)    12/14/17 1655   Pressure Ulcer Risk (Alejandro Scale)   Related Risk Factors (Pressure Ulcer Risk  (Alejandro Scale)) hospitalization prolonged;mobility impaired       Goal: Skin Integrity  Outcome: Ongoing (interventions implemented as appropriate)    12/17/17 0946   Pressure Ulcer Risk (Alejandro Scale) (Adult,Obstetrics,Pediatric)   Skin Integrity making progress toward outcome         Problem: Infection, Risk/Actual (Adult)  Goal: Identify Related Risk Factors and Signs and Symptoms  Outcome: Ongoing (interventions implemented as appropriate)    12/14/17 1655   Infection, Risk/Actual   Infection, Risk/Actual: Related Risk Factors chronic illness/condition;skin integrity impairment   Signs and Symptoms (Infection, Risk/Actual) weakness       Goal: Infection Prevention/Resolution  Outcome: Ongoing (interventions implemented as appropriate)    12/17/17 0946   Infection, Risk/Actual (Adult)   Infection Prevention/Resolution making progress toward outcome         Problem: Pressure Ulcer (Adult)  Goal: Signs and Symptoms of Listed Potential Problems Will be Absent or Manageable (Pressure Ulcer)  Outcome: Ongoing (interventions implemented as appropriate)    12/15/17 0735   Pressure Ulcer   Problems Assessed (Pressure Ulcer) all   Problems Present (Pressure Ulcer) wound progression/extension         Problem: Self-Care Deficit (Adult,Obstetrics,Pediatric)  Goal: Identify Related Risk Factors and Signs and Symptoms  Outcome: Ongoing (interventions implemented as appropriate)  Goal: Improved Ability to Perform BADL and IADL  Outcome: Ongoing (interventions implemented as appropriate)    12/17/17 0946   Self-Care Deficit (Adult,Obstetrics,Pediatric)   Improved Ability to Perform BADL and IADL making progress toward outcome         Problem: Colostomy (Adult)  Goal: Signs and Symptoms of Listed Potential Problems Will be Absent or Manageable (Colostomy)  Outcome: Ongoing (interventions implemented as appropriate)    12/14/17 1655   Colostomy   Problems Assessed (Colostomy) all   Problems Present (Colostomy) none

## 2017-12-17 NOTE — PROGRESS NOTES
Patient continues on day 13 vancomycin. Vancomycin trough level was reported as 20 mg/L today. Based on this level, will cotinue vancomycin dose to 2 gm q12 hrs. Will continue to follow and recheck a trough level when appropriate.    Thank you,    Gayle Kaye [facility]

## 2017-12-17 NOTE — PROGRESS NOTES
"  I have personally seen and examined the patient today and discussed overnight interval progress and pertinent issues with nursing staff.    Subjective:    Resting comfortably this morning with no issues reported by the patient's nurse.  No fever or diarrhea reported.    History taken from: patient chart RN      Vital Signs    /68 (BP Location: Right arm, Patient Position: Lying)  Pulse 86  Temp 97.8 °F (36.6 °C) (Oral)   Resp 20  Ht 182.9 cm (72\")  Wt 113 kg (249 lb 11.2 oz)  SpO2 96%  BMI 33.87 kg/m2    Temp:  [97.7 °F (36.5 °C)-98.8 °F (37.1 °C)] 97.8 °F (36.6 °C)      Intake/Output Summary (Last 24 hours) at 12/17/17 1000  Last data filed at 12/17/17 0405   Gross per 24 hour   Intake             1980 ml   Output              850 ml   Net             1130 ml     Intake & Output (last 3 days)       12/14 0701 - 12/15 0700 12/15 0701 - 12/16 0700 12/16 0701 - 12/17 0700 12/17 0701 - 12/18 0700    P.O. 1320 1560 1440     IV Piggyback 700 700 600     Irrigation   300     Total Intake(mL/kg) 2020 (17.8) 2260 (20) 2340 (20.7)     Urine (mL/kg/hr) 2000 (0.7) 2200 (0.8) 850 (0.3)     Stool 475 (0.2) 525 (0.2)      Total Output 2475 2725 850      Net -455 -465 +1490                    Physical exam:    General Appearance:    Alert, cooperative, in no acute distress   Head:    Normocephalic, without obvious abnormality, atraumatic   Eyes:            Lids and lashes normal, conjunctivae and sclerae normal, no   icterus, no pallor, corneas clear, PERRLA   Ears:    Ears appear intact with no abnormalities noted   Throat:   No oral lesions, no thrush, oral mucosa moist   Neck:   No adenopathy, supple, trachea midline, no thyromegaly, no   carotid bruit, no JVD   Back:     No tenderness to percussion or palpation, range of motion   normal   Lungs:     Clear to auscultation,respirations regular, even and unlabored. No wheezing, no ronchi and no crackles.    Heart:    Regular rhythm and normal rate, normal S1 and " S2, no            murmur, no gallop, no rub, no click   Chest Wall:    No abnormalities observed   Abdomen:     Normal bowel sounds, no masses, no organomegaly, soft        non-tender, non-distended, no guarding, no rebound                tenderness   Rectal:     Deferred   Extremities:   Moves all extremities well, no edema, no cyanosis, no             redness   Pulses:   Pulses palpable and equal bilaterally   Skin:  Right upper arm PICC site red    Lymph nodes:   No palpable adenopathy   Neurologic:   Awake, alert and oriented x 3. Following commands.           Results:      Results from last 7 days  Lab Units 12/17/17  0110 12/16/17  0221 12/15/17  0117 12/14/17  0100 12/13/17  0051 12/12/17  0024 12/11/17  0029   WBC 10*3/mm3 11.16 11.56 11.58 10.13 9.26 13.61* 11.03     Lab Results   Component Value Date    NEUTROABS 7.44 (H) 12/17/2017         Results from last 7 days  Lab Units 12/17/17  0110   CREATININE mg/dL 0.62         Results from last 7 days  Lab Units 12/17/17  0110 12/16/17  0221 12/15/17  0117   CRP mg/dL 4.05* 3.69* 4.14*       Results Review:    I have personally reviewed laboratory data, culture results, radiology studies and antimicrobial therapy.    Hospital Medications (active)       Dose Frequency Start End    acetaminophen (TYLENOL) tablet 650 mg 650 mg Every 6 Hours PRN 12/5/2017     Sig - Route: Take 2 tablets by mouth Every 6 (Six) Hours As Needed for Mild Pain  or Fever. - Oral    albuterol (PROVENTIL) nebulizer solution 0.083% 2.5 mg/3mL 2.5 mg Every 6 Hours PRN 12/6/2017     Sig - Route: Take 2.5 mg by nebulization Every 6 (Six) Hours As Needed for Wheezing or Shortness of Air. - Nebulization    ARIPiprazole (ABILIFY) tablet 10 mg 10 mg Nightly 12/6/2017     Sig - Route: Take 1 tablet by mouth Every Night. - Oral    aztreonam (AZACTAM) 2 g/100 mL 0.9% NS (mbp) 2 g Every 8 Hours 12/6/2017 12/16/2017    Sig - Route: Infuse 100 mL into a venous catheter Every 8 (Eight) Hours. -  "Intravenous    DULoxetine (CYMBALTA) DR capsule 60 mg 60 mg 2 Times Daily 12/6/2017     Sig - Route: Take 1 capsule by mouth 2 (Two) Times a Day. - Oral    famotidine (PEPCID) injection 20 mg 20 mg Every 12 Hours Scheduled 12/5/2017     Sig - Route: Infuse 2 mL into a venous catheter Every 12 (Twelve) Hours. - Intravenous    gabapentin (NEURONTIN) capsule 600 mg 600 mg Every 8 Hours Scheduled 12/6/2017     Sig - Route: Take 2 capsules by mouth Every 8 (Eight) Hours. - Oral    HYDROmorphone (DILAUDID) 1 MG/ML injection  - ADS Override Pull   12/6/2017 12/7/2017    Notes to Pharmacy: Created by cabinet override    ipratropium-albuterol (DUO-NEB) nebulizer solution 3 mL 3 mL 4 Times Daily - RT 12/5/2017     Sig - Route: Take 3 mL by nebulization 4 (Four) Times a Day. - Nebulization    Magnesium Sulfate 2 gram Bolus, followed by 8 gram infusion (total Mg dose 10 grams)- Mg less than or equal to 1mg/dL 2 g As Needed 12/6/2017     Sig - Route: Infuse 50 mL into a venous catheter As Needed (Mg less than or equal to 1mg/dL). - Intravenous    Cosign for Ordering: Accepted by Inocencia Montano MD on 12/6/2017  8:10 AM    Linked Group 1:  \"Or\" Linked Group Details        magnesium sulfate 4 gram infusion- Mg 1.6-1.9 mg/dL 4 g As Needed 12/6/2017     Sig - Route: Infuse 100 mL into a venous catheter As Needed (Mg 1.6-1.9 mg/dL). - Intravenous    Cosign for Ordering: Accepted by Inocencia Montano MD on 12/6/2017  8:10 AM    Linked Group 1:  \"Or\" Linked Group Details        magnesium sulfate 4 gram infusion- Mg 1.6-1.9 mg/dL 4 g Once 12/6/2017 12/6/2017    Sig - Route: Infuse 100 mL into a venous catheter 1 (One) Time. - Intravenous    Magnesium Sulfate 6 gram Infusion (2 gm x 3) -Mg 1.1 -1.5 mg/dL 2 g As Needed 12/6/2017     Sig - Route: Infuse 50 mL into a venous catheter As Needed (Mg 1.1 -1.5 mg/dL). - Intravenous    Cosign for Ordering: Accepted by Inocencia Montano MD on 12/6/2017  8:10 AM    Linked Group 1:  \"Or\" " "Linked Group Details        methenamine (HIPREX) tablet 1 g 1 g 2 Times Daily 12/6/2017     Sig - Route: Take 1 tablet by mouth 2 (Two) Times a Day. - Oral    metroNIDAZOLE (FLAGYL) IVPB 500 mg 500 mg Every 8 Hours 12/5/2017 12/15/2017    Sig - Route: Infuse 100 mL into a venous catheter Every 8 (Eight) Hours. - Intravenous    nitroglycerin (NITROSTAT) SL tablet 0.4 mg 0.4 mg Every 5 Minutes PRN 12/5/2017     Sig - Route: Place 1 tablet under the tongue Every 5 (Five) Minutes As Needed for Chest Pain (if systolic BP greater than 100 mm/Hg.). - Sublingual    Cosign for Ordering: Accepted by Inocencia Montano MD on 12/5/2017  6:13 PM    nystatin (MYCOSTATIN) powder 1 application 1 application 3 Times Daily PRN 12/6/2017     Sig - Route: Apply 1 application topically 3 (Three) Times a Day As Needed (skin). - Topical    pantoprazole (PROTONIX) EC tablet 40 mg 40 mg Every Morning 12/6/2017     Sig - Route: Take 1 tablet by mouth Every Morning. - Oral    potassium chloride (K-DUR,KLOR-CON) CR tablet 40 mEq 40 mEq Every 4 Hours 12/6/2017 12/6/2017    Sig - Route: Take 2 tablets by mouth Every 4 (Four) Hours. - Oral    potassium chloride (KLOR-CON) packet 40 mEq 40 mEq As Needed 12/6/2017     Sig - Route: Take 40 mEq by mouth As Needed (potassium replacement, see admin instructions). - Oral    Cosign for Ordering: Accepted by Inocencia Montano MD on 12/6/2017  8:10 AM    Linked Group 2:  \"Or\" Linked Group Details        potassium chloride (MICRO-K) CR capsule 40 mEq 40 mEq As Needed 12/6/2017     Sig - Route: Take 4 capsules by mouth As Needed (potassium replacement.  see admin instructions). - Oral    Cosign for Ordering: Accepted by Inocencia Montano MD on 12/6/2017  8:10 AM    Linked Group 2:  \"Or\" Linked Group Details        potassium chloride 10 mEq in 100 mL IVPB 10 mEq Every 1 Hour PRN 12/6/2017     Sig - Route: Infuse 100 mL into a venous catheter Every 1 (One) Hour As Needed (potassium protocol PERIPHERAL - " "see admin instructions). - Intravenous    Cosign for Ordering: Accepted by Inocencia Montano MD on 12/6/2017  8:10 AM    Linked Group 2:  \"Or\" Linked Group Details        sodium chloride 0.9 % flush 1-10 mL 1-10 mL As Needed 12/5/2017     Sig - Route: Infuse 1-10 mL into a venous catheter As Needed for Line Care. - Intravenous    Cosign for Ordering: Accepted by Inocencia Montano MD on 12/5/2017  6:13 PM    sodium chloride 0.9 % infusion 100 mL/hr Continuous 12/5/2017     Sig - Route: Infuse 100 mL/hr into a venous catheter Continuous. - Intravenous    sodium hypochlorite (DAKIN'S 1/4 STRENGTH) 0.125 % topical solution 0.125% solution  3 Times Daily 12/6/2017     Sig - Route: Apply  topically 3 (Three) Times a Day. - Topical    Cosign for Ordering: Accepted by Sung Blair MD on 12/6/2017  4:01 PM    sucralfate (CARAFATE) tablet 1 g 1 g 4 Times Daily Before Meals & Nightly 12/6/2017     Sig - Route: Take 1 tablet by mouth 4 (Four) Times a Day Before Meals & at Bedtime. - Oral    topiramate (TOPAMAX) tablet 100 mg 100 mg 3 Times Daily 12/6/2017     Sig - Route: Take 1 tablet by mouth 3 (Three) Times a Day. - Oral    vancomycin (VANCOCIN) 2,000 mg in sodium chloride 0.9 % 500 mL IVPB 2,000 mg Every 12 Hours 12/6/2017 12/20/2017    Sig - Route: Infuse 2,000 mg into a venous catheter Every 12 (Twelve) Hours. - Intravenous    zinc oxide 20 % ointment  3 Times Daily 12/6/2017     Sig - Route: Apply  topically 3 (Three) Times a Day. - Topical    Pharmacy to dose vancomycin (Discontinued)  Continuous PRN 12/5/2017 12/6/2017    Sig - Route: Continuous As Needed for Consult. - Does not apply    Reason for Discontinue: Duplicate order    sodium hypochlorite (DAKIN'S 1/4 STRENGTH) 0.125 % topical solution 0.125% solution (Discontinued)  2 Times Daily 12/6/2017 12/6/2017    Sig - Route: Apply  topically 2 (Two) Times a Day. - Topical    Cosign for Ordering: Accepted by Sung Blair MD on 12/6/2017  4:01 PM    " vancomycin (VANCOCIN) 2,000 mg in sodium chloride 0.9 % 500 mL IVPB (Discontinued) 2,000 mg Every 24 Hours 12/6/2017 12/6/2017    Sig - Route: Infuse 2,000 mg into a venous catheter Daily. - Intravenous            Cultures:    Blood Culture   Date Value Ref Range Status   12/05/2017 No growth at 24 hours  Preliminary   12/05/2017 No growth at 24 hours  Preliminary   12/05/2017 No growth at 24 hours  Preliminary     Urine Culture   Date Value Ref Range Status   12/05/2017 Culture in progress  Preliminary     Wound Culture   Date Value Ref Range Status   12/05/2017 (A)  Preliminary    Light growth (2+) Gram Negative Bacilli, Morphology consistent with Escherichia / Citrobacter           Assessment/Plan     ASSESSMENT:    1.  Severe sepsis with encephalopathy  2.  Aspiration pneumonia  3.  Decubitus ulcers    PLAN:    Resting comfortably this morning with no issues reported by the patient's nurse.  No fever or diarrhea reported.    Still awaiting possible transfer to  when bed available.    Recommend to continue broad-spectrum coverage with vancomycin, Azactam and Flagyl for now.    We will continue to follow closely.    Patient's findings and recommendations were discussed with patient and nursing staff    Code Status: Full Code     Susan Royal PA-C  12/17/17  10:00 AM

## 2017-12-18 VITALS
DIASTOLIC BLOOD PRESSURE: 70 MMHG | SYSTOLIC BLOOD PRESSURE: 106 MMHG | HEIGHT: 72 IN | TEMPERATURE: 98.8 F | RESPIRATION RATE: 20 BRPM | HEART RATE: 116 BPM | WEIGHT: 249.7 LBS | OXYGEN SATURATION: 98 % | BODY MASS INDEX: 33.82 KG/M2

## 2017-12-18 LAB
ANION GAP SERPL CALCULATED.3IONS-SCNC: 11.1 MMOL/L (ref 3.6–11.2)
BASOPHILS # BLD AUTO: 0.05 10*3/MM3 (ref 0–0.3)
BASOPHILS NFR BLD AUTO: 0.4 % (ref 0–2)
BUN BLD-MCNC: 16 MG/DL (ref 7–21)
BUN/CREAT SERPL: 29.1 (ref 7–25)
CALCIUM SPEC-SCNC: 9 MG/DL (ref 7.7–10)
CHLORIDE SERPL-SCNC: 107 MMOL/L (ref 99–112)
CO2 SERPL-SCNC: 17.9 MMOL/L (ref 24.3–31.9)
CREAT BLD-MCNC: 0.55 MG/DL (ref 0.43–1.29)
CRP SERPL-MCNC: 3.74 MG/DL (ref 0–0.99)
DEPRECATED RDW RBC AUTO: 60.3 FL (ref 37–54)
EOSINOPHIL # BLD AUTO: 0.24 10*3/MM3 (ref 0–0.7)
EOSINOPHIL NFR BLD AUTO: 2.1 % (ref 0–5)
ERYTHROCYTE [DISTWIDTH] IN BLOOD BY AUTOMATED COUNT: 20.1 % (ref 11.5–14.5)
GFR SERPL CREATININE-BSD FRML MDRD: >150 ML/MIN/1.73
GLUCOSE BLD-MCNC: 96 MG/DL (ref 70–110)
HCT VFR BLD AUTO: 32.8 % (ref 42–52)
HGB BLD-MCNC: 9.7 G/DL (ref 14–18)
IMM GRANULOCYTES # BLD: 0.02 10*3/MM3 (ref 0–0.03)
IMM GRANULOCYTES NFR BLD: 0.2 % (ref 0–0.5)
LYMPHOCYTES # BLD AUTO: 2.88 10*3/MM3 (ref 1–3)
LYMPHOCYTES NFR BLD AUTO: 25.6 % (ref 21–51)
MCH RBC QN AUTO: 24.4 PG (ref 27–33)
MCHC RBC AUTO-ENTMCNC: 29.6 G/DL (ref 33–37)
MCV RBC AUTO: 82.4 FL (ref 80–94)
MONOCYTES # BLD AUTO: 0.38 10*3/MM3 (ref 0.1–0.9)
MONOCYTES NFR BLD AUTO: 3.4 % (ref 0–10)
NEUTROPHILS # BLD AUTO: 7.67 10*3/MM3 (ref 1.4–6.5)
NEUTROPHILS NFR BLD AUTO: 68.3 % (ref 30–70)
NRBC BLD MANUAL-RTO: 0 /100 WBC (ref 0–0)
OSMOLALITY SERPL CALC.SUM OF ELEC: 273 MOSM/KG (ref 273–305)
PLATELET # BLD AUTO: 674 10*3/MM3 (ref 130–400)
PMV BLD AUTO: 9.4 FL (ref 6–10)
POTASSIUM BLD-SCNC: 3.9 MMOL/L (ref 3.5–5.3)
RBC # BLD AUTO: 3.98 10*6/MM3 (ref 4.7–6.1)
SODIUM BLD-SCNC: 136 MMOL/L (ref 135–153)
WBC NRBC COR # BLD: 11.24 10*3/MM3 (ref 4.5–12.5)

## 2017-12-18 PROCEDURE — 85025 COMPLETE CBC W/AUTO DIFF WBC: CPT | Performed by: INTERNAL MEDICINE

## 2017-12-18 PROCEDURE — 80048 BASIC METABOLIC PNL TOTAL CA: CPT | Performed by: INTERNAL MEDICINE

## 2017-12-18 PROCEDURE — 86140 C-REACTIVE PROTEIN: CPT | Performed by: INTERNAL MEDICINE

## 2017-12-18 NOTE — NURSING NOTE
Patient Ken Krueger was picked up and transported to [facility] by [facility].

## 2017-12-18 NOTE — NURSING NOTE
[facility] called and said they could transport patient to [facility].

## 2017-12-18 NOTE — NURSING NOTE
Gave report to Lizeth CHRISTIE at [facility] in regards to our patient Ken Krueger who is being transferred. Patient is being transferred to [facility] 5S/ Rm#[id number]. Gissell Wallace RN, faxed face sheet to [phone].

## 2017-12-18 NOTE — NURSING NOTE
Called Dr. Landers and made him aware that patient Ken Krueger has been transferred to [facility] by [facility].

## 2017-12-18 NOTE — PROGRESS NOTES
I was informed by RN that patient now has a bed available at . Transfer orders completed. Patient left via ambulance at 2:12 am. Dr Moscoso notified of patient's discharge.
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
## 2018-07-27 ENCOUNTER — HOSPITAL ENCOUNTER (INPATIENT)
Facility: HOSPITAL | Age: 43
LOS: 4 days | Discharge: HOME HEALTH | End: 2018-07-31
Attending: INTERNAL MEDICINE | Admitting: INTERNAL MEDICINE

## 2018-07-27 ENCOUNTER — APPOINTMENT (OUTPATIENT)
Dept: GENERAL RADIOLOGY | Facility: HOSPITAL | Age: 43
End: 2018-07-27

## 2018-07-27 ENCOUNTER — APPOINTMENT (OUTPATIENT)
Dept: CT IMAGING | Facility: HOSPITAL | Age: 43
End: 2018-07-27

## 2018-07-27 DIAGNOSIS — L89.329 DECUBITUS ULCER OF LEFT BUTTOCK, UNSPECIFIED ULCER STAGE: ICD-10-CM

## 2018-07-27 DIAGNOSIS — R73.9 HYPERGLYCEMIA: ICD-10-CM

## 2018-07-27 DIAGNOSIS — L89.319 DECUBITUS ULCER OF RIGHT BUTTOCK, UNSPECIFIED ULCER STAGE: Primary | ICD-10-CM

## 2018-07-27 DIAGNOSIS — M86.9 OSTEOMYELITIS, UNSPECIFIED SITE, UNSPECIFIED TYPE (HCC): ICD-10-CM

## 2018-07-27 DIAGNOSIS — L89.314 DECUBITUS ULCER OF RIGHT BUTTOCK, STAGE 4 (HCC): ICD-10-CM

## 2018-07-27 LAB
A-A DO2: 16.3 MMHG (ref 0–300)
ACETONE BLD QL: NEGATIVE
ALBUMIN SERPL-MCNC: 4 G/DL (ref 3.5–5)
ALBUMIN/GLOB SERPL: 0.9 G/DL (ref 1.5–2.5)
ALP SERPL-CCNC: 129 U/L (ref 40–129)
ALT SERPL W P-5'-P-CCNC: 35 U/L (ref 10–44)
ANION GAP SERPL CALCULATED.3IONS-SCNC: 9.5 MMOL/L (ref 3.6–11.2)
ARTERIAL PATENCY WRIST A: POSITIVE
AST SERPL-CCNC: 23 U/L (ref 10–34)
ATMOSPHERIC PRESS: 726 MMHG
BACTERIA UR QL AUTO: ABNORMAL /HPF
BASE EXCESS BLDA CALC-SCNC: -3.2 MMOL/L
BASOPHILS # BLD AUTO: 0.02 10*3/MM3 (ref 0–0.3)
BASOPHILS NFR BLD AUTO: 0.2 % (ref 0–2)
BDY SITE: ABNORMAL
BILIRUB SERPL-MCNC: 0.3 MG/DL (ref 0.2–1.8)
BILIRUB UR QL STRIP: NEGATIVE
BODY TEMPERATURE: 98.9 C
BUN BLD-MCNC: 18 MG/DL (ref 7–21)
BUN/CREAT SERPL: 16.1 (ref 7–25)
CALCIUM SPEC-SCNC: 9.8 MG/DL (ref 7.7–10)
CHLORIDE SERPL-SCNC: 100 MMOL/L (ref 99–112)
CK MB SERPL-CCNC: 0.67 NG/ML (ref 0–5)
CK MB SERPL-RTO: 2.5 % (ref 0–3)
CK SERPL-CCNC: 27 U/L (ref 24–204)
CLARITY UR: CLEAR
CO2 SERPL-SCNC: 21.5 MMOL/L (ref 24.3–31.9)
COHGB MFR BLD: 1.4 % (ref 0–5)
COLOR UR: YELLOW
CREAT BLD-MCNC: 1.12 MG/DL (ref 0.43–1.29)
CRP SERPL-MCNC: 10.71 MG/DL (ref 0–0.99)
D-LACTATE SERPL-SCNC: 1.1 MMOL/L (ref 0.5–2)
D-LACTATE SERPL-SCNC: 2.7 MMOL/L (ref 0.5–2)
DEPRECATED RDW RBC AUTO: 44.5 FL (ref 37–54)
EOSINOPHIL # BLD AUTO: 0.09 10*3/MM3 (ref 0–0.7)
EOSINOPHIL NFR BLD AUTO: 0.9 % (ref 0–5)
ERYTHROCYTE [DISTWIDTH] IN BLOOD BY AUTOMATED COUNT: 14 % (ref 11.5–14.5)
ERYTHROCYTE [SEDIMENTATION RATE] IN BLOOD: 49 MM/HR (ref 0–15)
GFR SERPL CREATININE-BSD FRML MDRD: 73 ML/MIN/1.73
GLOBULIN UR ELPH-MCNC: 4.4 GM/DL
GLUCOSE BLD-MCNC: 625 MG/DL (ref 70–110)
GLUCOSE BLDC GLUCOMTR-MCNC: 390 MG/DL (ref 70–130)
GLUCOSE BLDC GLUCOMTR-MCNC: 474 MG/DL (ref 70–130)
GLUCOSE BLDC GLUCOMTR-MCNC: 598 MG/DL (ref 70–130)
GLUCOSE UR STRIP-MCNC: ABNORMAL MG/DL
HBA1C MFR BLD: 12 % (ref 4.5–5.7)
HCO3 BLDA-SCNC: 21.2 MMOL/L (ref 22–26)
HCT VFR BLD AUTO: 41.6 % (ref 42–52)
HCT VFR BLD CALC: 39 % (ref 42–52)
HGB BLD-MCNC: 12.8 G/DL (ref 14–18)
HGB BLDA-MCNC: 13.4 G/DL (ref 12–16)
HGB UR QL STRIP.AUTO: ABNORMAL
HOLD SPECIMEN: NORMAL
HOROWITZ INDEX BLD+IHG-RTO: 21 %
HYALINE CASTS UR QL AUTO: ABNORMAL /LPF
IMM GRANULOCYTES # BLD: 0.01 10*3/MM3 (ref 0–0.03)
IMM GRANULOCYTES NFR BLD: 0.1 % (ref 0–0.5)
KETONES UR QL STRIP: ABNORMAL
LEUKOCYTE ESTERASE UR QL STRIP.AUTO: NEGATIVE
LYMPHOCYTES # BLD AUTO: 1.97 10*3/MM3 (ref 1–3)
LYMPHOCYTES NFR BLD AUTO: 20.4 % (ref 21–51)
MCH RBC QN AUTO: 27.1 PG (ref 27–33)
MCHC RBC AUTO-ENTMCNC: 30.8 G/DL (ref 33–37)
MCV RBC AUTO: 87.9 FL (ref 80–94)
METHGB BLD QL: 0.1 % (ref 0–3)
MODALITY: ABNORMAL
MONOCYTES # BLD AUTO: 0.29 10*3/MM3 (ref 0.1–0.9)
MONOCYTES NFR BLD AUTO: 3 % (ref 0–10)
NEUTROPHILS # BLD AUTO: 7.28 10*3/MM3 (ref 1.4–6.5)
NEUTROPHILS NFR BLD AUTO: 75.4 % (ref 30–70)
NITRITE UR QL STRIP: POSITIVE
OSMOLALITY SERPL CALC.SUM OF ELEC: 293.8 MOSM/KG (ref 273–305)
OXYHGB MFR BLDV: 94.1 % (ref 85–100)
PCO2 BLDA: 36 MM HG (ref 35–45)
PH BLDA: 7.39 PH UNITS (ref 7.35–7.45)
PH UR STRIP.AUTO: 6 [PH] (ref 5–8)
PLATELET # BLD AUTO: 408 10*3/MM3 (ref 130–400)
PMV BLD AUTO: 11.3 FL (ref 6–10)
PO2 BLDA: 81.6 MM HG (ref 80–100)
POTASSIUM BLD-SCNC: 3.8 MMOL/L (ref 3.5–5.3)
PROT SERPL-MCNC: 8.4 G/DL (ref 6–8)
PROT UR QL STRIP: ABNORMAL
RBC # BLD AUTO: 4.73 10*6/MM3 (ref 4.7–6.1)
RBC # UR: ABNORMAL /HPF
REF LAB TEST METHOD: ABNORMAL
SAO2 % BLDCOA: 95.5 % (ref 90–100)
SODIUM BLD-SCNC: 131 MMOL/L (ref 135–153)
SP GR UR STRIP: >1.03 (ref 1–1.03)
SQUAMOUS #/AREA URNS HPF: ABNORMAL /HPF
TROPONIN I SERPL-MCNC: 0.02 NG/ML
UROBILINOGEN UR QL STRIP: ABNORMAL
WBC NRBC COR # BLD: 9.66 10*3/MM3 (ref 4.5–12.5)
WBC UR QL AUTO: ABNORMAL /HPF
YEAST URNS QL MICRO: ABNORMAL /HPF

## 2018-07-27 PROCEDURE — 87070 CULTURE OTHR SPECIMN AEROBIC: CPT | Performed by: PHYSICIAN ASSISTANT

## 2018-07-27 PROCEDURE — 82962 GLUCOSE BLOOD TEST: CPT

## 2018-07-27 PROCEDURE — 83036 HEMOGLOBIN GLYCOSYLATED A1C: CPT | Performed by: HOSPITALIST

## 2018-07-27 PROCEDURE — 81001 URINALYSIS AUTO W/SCOPE: CPT | Performed by: PHYSICIAN ASSISTANT

## 2018-07-27 PROCEDURE — 71045 X-RAY EXAM CHEST 1 VIEW: CPT | Performed by: RADIOLOGY

## 2018-07-27 PROCEDURE — 36415 COLL VENOUS BLD VENIPUNCTURE: CPT

## 2018-07-27 PROCEDURE — 82009 KETONE BODYS QUAL: CPT | Performed by: PHYSICIAN ASSISTANT

## 2018-07-27 PROCEDURE — 86140 C-REACTIVE PROTEIN: CPT | Performed by: PHYSICIAN ASSISTANT

## 2018-07-27 PROCEDURE — 93005 ELECTROCARDIOGRAM TRACING: CPT | Performed by: NURSE PRACTITIONER

## 2018-07-27 PROCEDURE — 87077 CULTURE AEROBIC IDENTIFY: CPT | Performed by: PHYSICIAN ASSISTANT

## 2018-07-27 PROCEDURE — 84484 ASSAY OF TROPONIN QUANT: CPT | Performed by: NURSE PRACTITIONER

## 2018-07-27 PROCEDURE — 87040 BLOOD CULTURE FOR BACTERIA: CPT | Performed by: PHYSICIAN ASSISTANT

## 2018-07-27 PROCEDURE — 87147 CULTURE TYPE IMMUNOLOGIC: CPT | Performed by: PHYSICIAN ASSISTANT

## 2018-07-27 PROCEDURE — 87205 SMEAR GRAM STAIN: CPT | Performed by: PHYSICIAN ASSISTANT

## 2018-07-27 PROCEDURE — 93010 ELECTROCARDIOGRAM REPORT: CPT | Performed by: INTERNAL MEDICINE

## 2018-07-27 PROCEDURE — 71045 X-RAY EXAM CHEST 1 VIEW: CPT

## 2018-07-27 PROCEDURE — 83050 HGB METHEMOGLOBIN QUAN: CPT | Performed by: PHYSICIAN ASSISTANT

## 2018-07-27 PROCEDURE — 87086 URINE CULTURE/COLONY COUNT: CPT | Performed by: PHYSICIAN ASSISTANT

## 2018-07-27 PROCEDURE — 83605 ASSAY OF LACTIC ACID: CPT | Performed by: PHYSICIAN ASSISTANT

## 2018-07-27 PROCEDURE — 99285 EMERGENCY DEPT VISIT HI MDM: CPT

## 2018-07-27 PROCEDURE — 36600 WITHDRAWAL OF ARTERIAL BLOOD: CPT | Performed by: PHYSICIAN ASSISTANT

## 2018-07-27 PROCEDURE — 83874 ASSAY OF MYOGLOBIN: CPT | Performed by: NURSE PRACTITIONER

## 2018-07-27 PROCEDURE — 87181 SC STD AGAR DILUTION PER AGT: CPT | Performed by: PHYSICIAN ASSISTANT

## 2018-07-27 PROCEDURE — 82550 ASSAY OF CK (CPK): CPT | Performed by: NURSE PRACTITIONER

## 2018-07-27 PROCEDURE — 80053 COMPREHEN METABOLIC PANEL: CPT | Performed by: PHYSICIAN ASSISTANT

## 2018-07-27 PROCEDURE — 63710000001 INSULIN REGULAR HUMAN PER 5 UNITS: Performed by: FAMILY MEDICINE

## 2018-07-27 PROCEDURE — 72192 CT PELVIS W/O DYE: CPT

## 2018-07-27 PROCEDURE — 72192 CT PELVIS W/O DYE: CPT | Performed by: RADIOLOGY

## 2018-07-27 PROCEDURE — 85652 RBC SED RATE AUTOMATED: CPT | Performed by: PHYSICIAN ASSISTANT

## 2018-07-27 PROCEDURE — 82805 BLOOD GASES W/O2 SATURATION: CPT | Performed by: PHYSICIAN ASSISTANT

## 2018-07-27 PROCEDURE — 99223 1ST HOSP IP/OBS HIGH 75: CPT | Performed by: HOSPITALIST

## 2018-07-27 PROCEDURE — 85025 COMPLETE CBC W/AUTO DIFF WBC: CPT | Performed by: PHYSICIAN ASSISTANT

## 2018-07-27 PROCEDURE — 63710000001 INSULIN REGULAR HUMAN PER 5 UNITS: Performed by: PHYSICIAN ASSISTANT

## 2018-07-27 PROCEDURE — 82375 ASSAY CARBOXYHB QUANT: CPT | Performed by: PHYSICIAN ASSISTANT

## 2018-07-27 PROCEDURE — 82553 CREATINE MB FRACTION: CPT | Performed by: NURSE PRACTITIONER

## 2018-07-27 PROCEDURE — 87186 SC STD MICRODIL/AGAR DIL: CPT | Performed by: PHYSICIAN ASSISTANT

## 2018-07-27 PROCEDURE — 63710000001 INSULIN ASPART PER 5 UNITS: Performed by: HOSPITALIST

## 2018-07-27 PROCEDURE — 25010000002 VANCOMYCIN PER 500 MG

## 2018-07-27 RX ORDER — NICOTINE POLACRILEX 4 MG
15 LOZENGE BUCCAL
Status: DISCONTINUED | OUTPATIENT
Start: 2018-07-27 | End: 2018-07-31 | Stop reason: HOSPADM

## 2018-07-27 RX ORDER — ATORVASTATIN CALCIUM 10 MG/1
10 TABLET, FILM COATED ORAL NIGHTLY
COMMUNITY

## 2018-07-27 RX ORDER — PHENTERMINE HYDROCHLORIDE 37.5 MG/1
37.5 TABLET ORAL
COMMUNITY
End: 2018-07-31 | Stop reason: HOSPADM

## 2018-07-27 RX ORDER — SODIUM CHLORIDE 9 MG/ML
100 INJECTION, SOLUTION INTRAVENOUS CONTINUOUS
Status: DISCONTINUED | OUTPATIENT
Start: 2018-07-27 | End: 2018-07-30

## 2018-07-27 RX ORDER — NITROGLYCERIN 0.4 MG/1
0.4 TABLET SUBLINGUAL
Status: DISCONTINUED | OUTPATIENT
Start: 2018-07-27 | End: 2018-07-31 | Stop reason: HOSPADM

## 2018-07-27 RX ORDER — SODIUM CHLORIDE 0.9 % (FLUSH) 0.9 %
1-10 SYRINGE (ML) INJECTION AS NEEDED
Status: DISCONTINUED | OUTPATIENT
Start: 2018-07-27 | End: 2018-07-31 | Stop reason: HOSPADM

## 2018-07-27 RX ORDER — BACLOFEN 20 MG/1
20 TABLET ORAL 3 TIMES DAILY
Status: ON HOLD | COMMUNITY
End: 2018-07-28

## 2018-07-27 RX ORDER — POTASSIUM CHLORIDE 750 MG/1
10 TABLET, FILM COATED, EXTENDED RELEASE ORAL DAILY
COMMUNITY
End: 2019-02-26 | Stop reason: HOSPADM

## 2018-07-27 RX ORDER — DEXTROSE MONOHYDRATE 25 G/50ML
25 INJECTION, SOLUTION INTRAVENOUS
Status: DISCONTINUED | OUTPATIENT
Start: 2018-07-27 | End: 2018-07-31 | Stop reason: HOSPADM

## 2018-07-27 RX ORDER — MODAFINIL 100 MG/1
100 TABLET ORAL DAILY
Status: ON HOLD | COMMUNITY
End: 2018-07-28

## 2018-07-27 RX ORDER — ALBUTEROL SULFATE 90 UG/1
2 AEROSOL, METERED RESPIRATORY (INHALATION) EVERY 4 HOURS PRN
Status: ON HOLD | COMMUNITY
End: 2022-12-10

## 2018-07-27 RX ORDER — SODIUM CHLORIDE 0.9 % (FLUSH) 0.9 %
10 SYRINGE (ML) INJECTION AS NEEDED
Status: DISCONTINUED | OUTPATIENT
Start: 2018-07-27 | End: 2018-07-31 | Stop reason: HOSPADM

## 2018-07-27 RX ORDER — FENOFIBRATE 145 MG/1
145 TABLET, COATED ORAL DAILY
Status: ON HOLD | COMMUNITY
End: 2022-03-21

## 2018-07-27 RX ORDER — ONDANSETRON HYDROCHLORIDE 8 MG/1
8 TABLET, FILM COATED ORAL EVERY 8 HOURS PRN
COMMUNITY
End: 2018-07-31 | Stop reason: HOSPADM

## 2018-07-27 RX ORDER — TRAZODONE HYDROCHLORIDE 100 MG/1
100 TABLET ORAL NIGHTLY
Status: ON HOLD | COMMUNITY
End: 2018-07-28

## 2018-07-27 RX ORDER — RANITIDINE 150 MG/1
150 TABLET ORAL 2 TIMES DAILY PRN
COMMUNITY
End: 2020-01-06 | Stop reason: SINTOL

## 2018-07-27 RX ORDER — IBUPROFEN 600 MG/1
600 TABLET ORAL EVERY 8 HOURS PRN
COMMUNITY
End: 2018-07-31 | Stop reason: HOSPADM

## 2018-07-27 RX ADMIN — HUMAN INSULIN 6 UNITS: 100 INJECTION, SOLUTION SUBCUTANEOUS at 18:21

## 2018-07-27 RX ADMIN — SODIUM CHLORIDE 1000 ML: 9 INJECTION, SOLUTION INTRAVENOUS at 20:57

## 2018-07-27 RX ADMIN — METRONIDAZOLE 500 MG: 500 INJECTION, SOLUTION INTRAVENOUS at 21:00

## 2018-07-27 RX ADMIN — VANCOMYCIN HYDROCHLORIDE 2000 MG: 5 INJECTION, POWDER, LYOPHILIZED, FOR SOLUTION INTRAVENOUS at 17:51

## 2018-07-27 RX ADMIN — SODIUM CHLORIDE 1000 ML: 9 INJECTION, SOLUTION INTRAVENOUS at 18:21

## 2018-07-27 RX ADMIN — SODIUM CHLORIDE 100 ML/HR: 9 INJECTION, SOLUTION INTRAVENOUS at 22:40

## 2018-07-27 RX ADMIN — INSULIN ASPART 14 UNITS: 100 INJECTION, SOLUTION INTRAVENOUS; SUBCUTANEOUS at 21:31

## 2018-07-27 RX ADMIN — AZTREONAM 2 G: 2 INJECTION, POWDER, FOR SOLUTION INTRAMUSCULAR; INTRAVENOUS at 21:00

## 2018-07-27 RX ADMIN — HUMAN INSULIN 15 UNITS: 100 INJECTION, SOLUTION SUBCUTANEOUS at 20:58

## 2018-07-28 LAB
ALBUMIN SERPL-MCNC: 3.6 G/DL (ref 3.5–5)
ALBUMIN/GLOB SERPL: 0.9 G/DL (ref 1.5–2.5)
ALP SERPL-CCNC: 112 U/L (ref 40–129)
ALT SERPL W P-5'-P-CCNC: 30 U/L (ref 10–44)
ANION GAP SERPL CALCULATED.3IONS-SCNC: 6.5 MMOL/L (ref 3.6–11.2)
AST SERPL-CCNC: 21 U/L (ref 10–34)
BASOPHILS # BLD AUTO: 0.02 10*3/MM3 (ref 0–0.3)
BASOPHILS NFR BLD AUTO: 0.2 % (ref 0–2)
BILIRUB SERPL-MCNC: 0.2 MG/DL (ref 0.2–1.8)
BUN BLD-MCNC: 13 MG/DL (ref 7–21)
BUN/CREAT SERPL: 17.3 (ref 7–25)
CALCIUM SPEC-SCNC: 9.1 MG/DL (ref 7.7–10)
CHLORIDE SERPL-SCNC: 111 MMOL/L (ref 99–112)
CK MB SERPL-CCNC: 0.82 NG/ML (ref 0–5)
CK MB SERPL-CCNC: 1.11 NG/ML (ref 0–5)
CK MB SERPL-RTO: 2.7 % (ref 0–3)
CK MB SERPL-RTO: 2.7 % (ref 0–3)
CK SERPL-CCNC: 30 U/L (ref 24–204)
CK SERPL-CCNC: 41 U/L (ref 24–204)
CO2 SERPL-SCNC: 23.5 MMOL/L (ref 24.3–31.9)
CREAT BLD-MCNC: 0.75 MG/DL (ref 0.43–1.29)
CRP SERPL-MCNC: 9.01 MG/DL (ref 0–0.99)
DEPRECATED RDW RBC AUTO: 43.6 FL (ref 37–54)
EOSINOPHIL # BLD AUTO: 0.17 10*3/MM3 (ref 0–0.7)
EOSINOPHIL NFR BLD AUTO: 1.9 % (ref 0–5)
ERYTHROCYTE [DISTWIDTH] IN BLOOD BY AUTOMATED COUNT: 13.9 % (ref 11.5–14.5)
GFR SERPL CREATININE-BSD FRML MDRD: 115 ML/MIN/1.73
GLOBULIN UR ELPH-MCNC: 4.2 GM/DL
GLUCOSE BLD-MCNC: 114 MG/DL (ref 70–110)
GLUCOSE BLDC GLUCOMTR-MCNC: 120 MG/DL (ref 70–130)
GLUCOSE BLDC GLUCOMTR-MCNC: 143 MG/DL (ref 70–130)
GLUCOSE BLDC GLUCOMTR-MCNC: 199 MG/DL (ref 70–130)
GLUCOSE BLDC GLUCOMTR-MCNC: 276 MG/DL (ref 70–130)
GLUCOSE BLDC GLUCOMTR-MCNC: 312 MG/DL (ref 70–130)
HCT VFR BLD AUTO: 39.6 % (ref 42–52)
HGB BLD-MCNC: 12.2 G/DL (ref 14–18)
IMM GRANULOCYTES # BLD: 0.01 10*3/MM3 (ref 0–0.03)
IMM GRANULOCYTES NFR BLD: 0.1 % (ref 0–0.5)
LYMPHOCYTES # BLD AUTO: 2.24 10*3/MM3 (ref 1–3)
LYMPHOCYTES NFR BLD AUTO: 25.5 % (ref 21–51)
MCH RBC QN AUTO: 27.1 PG (ref 27–33)
MCHC RBC AUTO-ENTMCNC: 30.8 G/DL (ref 33–37)
MCV RBC AUTO: 87.8 FL (ref 80–94)
MONOCYTES # BLD AUTO: 0.3 10*3/MM3 (ref 0.1–0.9)
MONOCYTES NFR BLD AUTO: 3.4 % (ref 0–10)
MYOGLOBIN SERPL-MCNC: 36 NG/ML (ref 0–109)
MYOGLOBIN SERPL-MCNC: 46 NG/ML (ref 0–109)
MYOGLOBIN SERPL-MCNC: 48 NG/ML (ref 0–109)
NEUTROPHILS # BLD AUTO: 6.03 10*3/MM3 (ref 1.4–6.5)
NEUTROPHILS NFR BLD AUTO: 68.9 % (ref 30–70)
OSMOLALITY SERPL CALC.SUM OF ELEC: 282.2 MOSM/KG (ref 273–305)
PLATELET # BLD AUTO: 400 10*3/MM3 (ref 130–400)
PMV BLD AUTO: 10.7 FL (ref 6–10)
POTASSIUM BLD-SCNC: 3.1 MMOL/L (ref 3.5–5.3)
POTASSIUM BLD-SCNC: 3.4 MMOL/L (ref 3.5–5.3)
PROT SERPL-MCNC: 7.8 G/DL (ref 6–8)
RBC # BLD AUTO: 4.51 10*6/MM3 (ref 4.7–6.1)
SODIUM BLD-SCNC: 141 MMOL/L (ref 135–153)
TROPONIN I SERPL-MCNC: 0.02 NG/ML
TROPONIN I SERPL-MCNC: 0.02 NG/ML
WBC NRBC COR # BLD: 8.77 10*3/MM3 (ref 4.5–12.5)

## 2018-07-28 PROCEDURE — 82550 ASSAY OF CK (CPK): CPT | Performed by: NURSE PRACTITIONER

## 2018-07-28 PROCEDURE — 94760 N-INVAS EAR/PLS OXIMETRY 1: CPT

## 2018-07-28 PROCEDURE — 82553 CREATINE MB FRACTION: CPT | Performed by: NURSE PRACTITIONER

## 2018-07-28 PROCEDURE — 99233 SBSQ HOSP IP/OBS HIGH 50: CPT | Performed by: INTERNAL MEDICINE

## 2018-07-28 PROCEDURE — 63710000001 INSULIN ASPART PER 5 UNITS: Performed by: HOSPITALIST

## 2018-07-28 PROCEDURE — 94799 UNLISTED PULMONARY SVC/PX: CPT

## 2018-07-28 PROCEDURE — 25010000002 VANCOMYCIN PER 500 MG: Performed by: HOSPITALIST

## 2018-07-28 PROCEDURE — 80053 COMPREHEN METABOLIC PANEL: CPT | Performed by: HOSPITALIST

## 2018-07-28 PROCEDURE — 63710000001 INSULIN DETEMIR PER 5 UNITS: Performed by: HOSPITALIST

## 2018-07-28 PROCEDURE — 82962 GLUCOSE BLOOD TEST: CPT

## 2018-07-28 PROCEDURE — 84484 ASSAY OF TROPONIN QUANT: CPT | Performed by: NURSE PRACTITIONER

## 2018-07-28 PROCEDURE — 94640 AIRWAY INHALATION TREATMENT: CPT

## 2018-07-28 PROCEDURE — 84132 ASSAY OF SERUM POTASSIUM: CPT | Performed by: PHYSICIAN ASSISTANT

## 2018-07-28 PROCEDURE — 93010 ELECTROCARDIOGRAM REPORT: CPT | Performed by: INTERNAL MEDICINE

## 2018-07-28 PROCEDURE — 85025 COMPLETE CBC W/AUTO DIFF WBC: CPT | Performed by: HOSPITALIST

## 2018-07-28 PROCEDURE — 83874 ASSAY OF MYOGLOBIN: CPT | Performed by: NURSE PRACTITIONER

## 2018-07-28 PROCEDURE — 86140 C-REACTIVE PROTEIN: CPT | Performed by: HOSPITALIST

## 2018-07-28 PROCEDURE — 93005 ELECTROCARDIOGRAM TRACING: CPT | Performed by: HOSPITALIST

## 2018-07-28 PROCEDURE — 99222 1ST HOSP IP/OBS MODERATE 55: CPT | Performed by: SURGERY

## 2018-07-28 RX ORDER — POTASSIUM CHLORIDE 7.45 MG/ML
10 INJECTION INTRAVENOUS
Status: DISCONTINUED | OUTPATIENT
Start: 2018-07-28 | End: 2018-07-31 | Stop reason: HOSPADM

## 2018-07-28 RX ORDER — MAGNESIUM SULFATE HEPTAHYDRATE 40 MG/ML
4 INJECTION, SOLUTION INTRAVENOUS AS NEEDED
Status: DISCONTINUED | OUTPATIENT
Start: 2018-07-28 | End: 2018-07-31 | Stop reason: HOSPADM

## 2018-07-28 RX ORDER — INSULIN GLARGINE 100 [IU]/ML
15 INJECTION, SOLUTION SUBCUTANEOUS NIGHTLY
Status: ON HOLD | COMMUNITY
End: 2018-07-31

## 2018-07-28 RX ORDER — TOPIRAMATE 100 MG/1
100 TABLET, FILM COATED ORAL 3 TIMES DAILY
Status: DISCONTINUED | OUTPATIENT
Start: 2018-07-28 | End: 2018-07-31 | Stop reason: HOSPADM

## 2018-07-28 RX ORDER — MENTHOL AND ZINC OXIDE .44; 20.625 G/100G; G/100G
OINTMENT TOPICAL 3 TIMES DAILY
Status: DISCONTINUED | OUTPATIENT
Start: 2018-07-28 | End: 2018-07-31 | Stop reason: HOSPADM

## 2018-07-28 RX ORDER — SUCRALFATE 1 G/1
1 TABLET ORAL 4 TIMES DAILY
Status: DISCONTINUED | OUTPATIENT
Start: 2018-07-28 | End: 2018-07-31 | Stop reason: HOSPADM

## 2018-07-28 RX ORDER — MAGNESIUM SULFATE HEPTAHYDRATE 40 MG/ML
2 INJECTION, SOLUTION INTRAVENOUS AS NEEDED
Status: DISCONTINUED | OUTPATIENT
Start: 2018-07-28 | End: 2018-07-31 | Stop reason: HOSPADM

## 2018-07-28 RX ORDER — CASTOR OIL AND BALSAM, PERU 788; 87 MG/G; MG/G
OINTMENT TOPICAL EVERY 12 HOURS SCHEDULED
Status: DISCONTINUED | OUTPATIENT
Start: 2018-07-28 | End: 2018-07-31 | Stop reason: HOSPADM

## 2018-07-28 RX ORDER — IBUPROFEN 600 MG/1
600 TABLET ORAL EVERY 8 HOURS PRN
Status: CANCELLED | OUTPATIENT
Start: 2018-07-28

## 2018-07-28 RX ORDER — METHENAMINE HIPPURATE 1000 MG/1
1 TABLET ORAL 2 TIMES DAILY
Status: DISCONTINUED | OUTPATIENT
Start: 2018-07-28 | End: 2018-07-31 | Stop reason: HOSPADM

## 2018-07-28 RX ORDER — ARIPIPRAZOLE 10 MG/1
10 TABLET ORAL NIGHTLY
Status: DISCONTINUED | OUTPATIENT
Start: 2018-07-28 | End: 2018-07-31 | Stop reason: HOSPADM

## 2018-07-28 RX ORDER — BACLOFEN 10 MG/1
30 TABLET ORAL
Status: DISCONTINUED | OUTPATIENT
Start: 2018-07-28 | End: 2018-07-31 | Stop reason: HOSPADM

## 2018-07-28 RX ORDER — POTASSIUM CHLORIDE 750 MG/1
40 CAPSULE, EXTENDED RELEASE ORAL AS NEEDED
Status: DISCONTINUED | OUTPATIENT
Start: 2018-07-28 | End: 2018-07-31 | Stop reason: HOSPADM

## 2018-07-28 RX ORDER — ATORVASTATIN CALCIUM 10 MG/1
10 TABLET, FILM COATED ORAL NIGHTLY
Status: DISCONTINUED | OUTPATIENT
Start: 2018-07-28 | End: 2018-07-31 | Stop reason: HOSPADM

## 2018-07-28 RX ORDER — MODAFINIL 100 MG/1
200 TABLET ORAL DAILY
COMMUNITY
End: 2018-07-31 | Stop reason: HOSPADM

## 2018-07-28 RX ORDER — MODAFINIL 100 MG/1
200 TABLET ORAL DAILY
Status: CANCELLED | OUTPATIENT
Start: 2018-07-28

## 2018-07-28 RX ORDER — FENOFIBRATE 145 MG/1
145 TABLET, COATED ORAL DAILY
Status: CANCELLED | OUTPATIENT
Start: 2018-07-28

## 2018-07-28 RX ORDER — GABAPENTIN 400 MG/1
800 CAPSULE ORAL EVERY 8 HOURS SCHEDULED
Status: DISCONTINUED | OUTPATIENT
Start: 2018-07-28 | End: 2018-07-31 | Stop reason: HOSPADM

## 2018-07-28 RX ORDER — BACLOFEN 20 MG/1
30 TABLET ORAL
COMMUNITY

## 2018-07-28 RX ORDER — POTASSIUM CHLORIDE 1.5 G/1.77G
40 POWDER, FOR SOLUTION ORAL AS NEEDED
Status: DISCONTINUED | OUTPATIENT
Start: 2018-07-28 | End: 2018-07-31 | Stop reason: HOSPADM

## 2018-07-28 RX ORDER — ONDANSETRON 4 MG/1
8 TABLET, FILM COATED ORAL EVERY 8 HOURS PRN
Status: CANCELLED | OUTPATIENT
Start: 2018-07-28

## 2018-07-28 RX ORDER — PANTOPRAZOLE SODIUM 40 MG/1
40 TABLET, DELAYED RELEASE ORAL EVERY MORNING
Status: DISCONTINUED | OUTPATIENT
Start: 2018-07-29 | End: 2018-07-31 | Stop reason: HOSPADM

## 2018-07-28 RX ORDER — OMEPRAZOLE 20 MG/1
20 CAPSULE, DELAYED RELEASE ORAL DAILY
COMMUNITY
End: 2019-02-26 | Stop reason: HOSPADM

## 2018-07-28 RX ORDER — ALBUTEROL SULFATE 2.5 MG/3ML
2.5 SOLUTION RESPIRATORY (INHALATION) EVERY 4 HOURS PRN
Status: DISCONTINUED | OUTPATIENT
Start: 2018-07-28 | End: 2018-07-31 | Stop reason: HOSPADM

## 2018-07-28 RX ORDER — POTASSIUM CHLORIDE 750 MG/1
10 TABLET, FILM COATED, EXTENDED RELEASE ORAL DAILY
Status: DISCONTINUED | OUTPATIENT
Start: 2018-07-28 | End: 2018-07-31 | Stop reason: HOSPADM

## 2018-07-28 RX ORDER — NYSTATIN 100000 [USP'U]/G
1 POWDER TOPICAL 3 TIMES DAILY PRN
Status: DISCONTINUED | OUTPATIENT
Start: 2018-07-28 | End: 2018-07-31 | Stop reason: HOSPADM

## 2018-07-28 RX ORDER — FAMOTIDINE 20 MG/1
20 TABLET, FILM COATED ORAL 2 TIMES DAILY
Status: DISCONTINUED | OUTPATIENT
Start: 2018-07-28 | End: 2018-07-31 | Stop reason: HOSPADM

## 2018-07-28 RX ORDER — POTASSIUM CHLORIDE 20 MEQ/1
40 TABLET, EXTENDED RELEASE ORAL EVERY 4 HOURS
Status: DISPENSED | OUTPATIENT
Start: 2018-07-28 | End: 2018-07-29

## 2018-07-28 RX ORDER — DULOXETIN HYDROCHLORIDE 60 MG/1
60 CAPSULE, DELAYED RELEASE ORAL EVERY 12 HOURS SCHEDULED
Status: DISCONTINUED | OUTPATIENT
Start: 2018-07-28 | End: 2018-07-31 | Stop reason: HOSPADM

## 2018-07-28 RX ADMIN — SUCRALFATE 1 G: 1 TABLET ORAL at 17:28

## 2018-07-28 RX ADMIN — BACLOFEN 30 MG: 10 TABLET ORAL at 17:28

## 2018-07-28 RX ADMIN — BACLOFEN 30 MG: 10 TABLET ORAL at 20:35

## 2018-07-28 RX ADMIN — SODIUM CHLORIDE 100 ML/HR: 9 INJECTION, SOLUTION INTRAVENOUS at 23:57

## 2018-07-28 RX ADMIN — METRONIDAZOLE 500 MG: 500 INJECTION, SOLUTION INTRAVENOUS at 13:46

## 2018-07-28 RX ADMIN — Medication: at 17:27

## 2018-07-28 RX ADMIN — INSULIN ASPART 8 UNITS: 100 INJECTION, SOLUTION INTRAVENOUS; SUBCUTANEOUS at 20:36

## 2018-07-28 RX ADMIN — FAMOTIDINE 20 MG: 20 TABLET, FILM COATED ORAL at 20:35

## 2018-07-28 RX ADMIN — CASTOR OIL AND BALSAM, PERU: 788; 87 OINTMENT TOPICAL at 17:27

## 2018-07-28 RX ADMIN — CASTOR OIL AND BALSAM, PERU: 788; 87 OINTMENT TOPICAL at 20:37

## 2018-07-28 RX ADMIN — SUCRALFATE 1 G: 1 TABLET ORAL at 20:34

## 2018-07-28 RX ADMIN — INSULIN ASPART 10 UNITS: 100 INJECTION, SOLUTION INTRAVENOUS; SUBCUTANEOUS at 17:35

## 2018-07-28 RX ADMIN — SODIUM CHLORIDE 100 ML/HR: 9 INJECTION, SOLUTION INTRAVENOUS at 05:11

## 2018-07-28 RX ADMIN — AZTREONAM 2 G: 2 INJECTION, POWDER, FOR SOLUTION INTRAMUSCULAR; INTRAVENOUS at 20:36

## 2018-07-28 RX ADMIN — AZTREONAM 2 G: 2 INJECTION, POWDER, FOR SOLUTION INTRAMUSCULAR; INTRAVENOUS at 13:46

## 2018-07-28 RX ADMIN — VANCOMYCIN HYDROCHLORIDE 2000 MG: 5 INJECTION, POWDER, LYOPHILIZED, FOR SOLUTION INTRAVENOUS at 06:29

## 2018-07-28 RX ADMIN — INSULIN DETEMIR 15 UNITS: 100 INJECTION, SOLUTION SUBCUTANEOUS at 00:00

## 2018-07-28 RX ADMIN — TOPIRAMATE 100 MG: 100 TABLET, FILM COATED ORAL at 20:35

## 2018-07-28 RX ADMIN — DULOXETINE HYDROCHLORIDE 60 MG: 60 CAPSULE, DELAYED RELEASE ORAL at 17:26

## 2018-07-28 RX ADMIN — GABAPENTIN 800 MG: 400 CAPSULE ORAL at 17:25

## 2018-07-28 RX ADMIN — POTASSIUM CHLORIDE 10 MEQ: 750 TABLET, FILM COATED, EXTENDED RELEASE ORAL at 17:26

## 2018-07-28 RX ADMIN — METRONIDAZOLE 500 MG: 500 INJECTION, SOLUTION INTRAVENOUS at 05:11

## 2018-07-28 RX ADMIN — VANCOMYCIN HYDROCHLORIDE 2000 MG: 5 INJECTION, POWDER, LYOPHILIZED, FOR SOLUTION INTRAVENOUS at 18:36

## 2018-07-28 RX ADMIN — AZTREONAM 2 G: 2 INJECTION, POWDER, FOR SOLUTION INTRAMUSCULAR; INTRAVENOUS at 05:12

## 2018-07-28 RX ADMIN — POTASSIUM CHLORIDE 40 MEQ: 1500 TABLET, EXTENDED RELEASE ORAL at 20:36

## 2018-07-28 RX ADMIN — INSULIN DETEMIR 15 UNITS: 100 INJECTION, SOLUTION SUBCUTANEOUS at 20:40

## 2018-07-28 RX ADMIN — ATORVASTATIN CALCIUM 10 MG: 10 TABLET, FILM COATED ORAL at 20:35

## 2018-07-28 RX ADMIN — TOPIRAMATE 100 MG: 100 TABLET, FILM COATED ORAL at 17:26

## 2018-07-28 RX ADMIN — ALBUTEROL SULFATE 2.5 MG: 2.5 SOLUTION RESPIRATORY (INHALATION) at 19:00

## 2018-07-28 RX ADMIN — Medication: at 20:37

## 2018-07-28 RX ADMIN — POTASSIUM CHLORIDE 40 MEQ: 1500 TABLET, EXTENDED RELEASE ORAL at 17:26

## 2018-07-28 RX ADMIN — ARIPIPRAZOLE 10 MG: 10 TABLET ORAL at 20:35

## 2018-07-28 RX ADMIN — METHENAMINE HIPPURATE 1 G: 1 TABLET ORAL at 20:35

## 2018-07-29 LAB
ALBUMIN SERPL-MCNC: 3.7 G/DL (ref 3.5–5)
ALBUMIN/GLOB SERPL: 0.9 G/DL (ref 1.5–2.5)
ALP SERPL-CCNC: 108 U/L (ref 40–129)
ALT SERPL W P-5'-P-CCNC: 28 U/L (ref 10–44)
ANION GAP SERPL CALCULATED.3IONS-SCNC: 4.5 MMOL/L (ref 3.6–11.2)
AST SERPL-CCNC: 25 U/L (ref 10–34)
BACTERIA SPEC AEROBE CULT: NORMAL
BILIRUB SERPL-MCNC: 0.2 MG/DL (ref 0.2–1.8)
BUN BLD-MCNC: 10 MG/DL (ref 7–21)
BUN/CREAT SERPL: 12.2 (ref 7–25)
CALCIUM SPEC-SCNC: 9.1 MG/DL (ref 7.7–10)
CHLORIDE SERPL-SCNC: 113 MMOL/L (ref 99–112)
CO2 SERPL-SCNC: 21.5 MMOL/L (ref 24.3–31.9)
CREAT BLD-MCNC: 0.82 MG/DL (ref 0.43–1.29)
CRP SERPL-MCNC: 5.24 MG/DL (ref 0–0.99)
DEPRECATED RDW RBC AUTO: 46.5 FL (ref 37–54)
EOSINOPHIL # BLD MANUAL: 0.23 10*3/MM3 (ref 0–0.7)
EOSINOPHIL NFR BLD MANUAL: 3 % (ref 0–5)
ERYTHROCYTE [DISTWIDTH] IN BLOOD BY AUTOMATED COUNT: 14.2 % (ref 11.5–14.5)
GFR SERPL CREATININE-BSD FRML MDRD: 104 ML/MIN/1.73
GLOBULIN UR ELPH-MCNC: 4.3 GM/DL
GLUCOSE BLD-MCNC: 243 MG/DL (ref 70–110)
GLUCOSE BLDC GLUCOMTR-MCNC: 218 MG/DL (ref 70–130)
GLUCOSE BLDC GLUCOMTR-MCNC: 263 MG/DL (ref 70–130)
HCT VFR BLD AUTO: 41.6 % (ref 42–52)
HGB BLD-MCNC: 12.7 G/DL (ref 14–18)
LYMPHOCYTES # BLD MANUAL: 2.21 10*3/MM3 (ref 1–3)
LYMPHOCYTES NFR BLD MANUAL: 2 % (ref 0–10)
LYMPHOCYTES NFR BLD MANUAL: 29 % (ref 21–51)
MAGNESIUM SERPL-MCNC: 1.8 MG/DL (ref 1.7–2.6)
MCH RBC QN AUTO: 27.4 PG (ref 27–33)
MCHC RBC AUTO-ENTMCNC: 30.5 G/DL (ref 33–37)
MCV RBC AUTO: 89.8 FL (ref 80–94)
MONOCYTES # BLD AUTO: 0.15 10*3/MM3 (ref 0.1–0.9)
NEUTROPHILS # BLD AUTO: 5.02 10*3/MM3 (ref 1.4–6.5)
NEUTROPHILS NFR BLD MANUAL: 66 % (ref 30–70)
OSMOLALITY SERPL CALC.SUM OF ELEC: 284.6 MOSM/KG (ref 273–305)
PHOSPHATE SERPL-MCNC: 4.1 MG/DL (ref 2.7–4.5)
PLATELET # BLD AUTO: 410 10*3/MM3 (ref 130–400)
PMV BLD AUTO: 10.7 FL (ref 6–10)
POTASSIUM BLD-SCNC: 3.8 MMOL/L (ref 3.5–5.3)
PROT SERPL-MCNC: 8 G/DL (ref 6–8)
RBC # BLD AUTO: 4.63 10*6/MM3 (ref 4.7–6.1)
RBC MORPH BLD: NORMAL
SCAN SLIDE: NORMAL
SMALL PLATELETS BLD QL SMEAR: NORMAL
SODIUM BLD-SCNC: 139 MMOL/L (ref 135–153)
VANCOMYCIN TROUGH SERPL-MCNC: 17.8 MCG/ML (ref 5–15)
WBC NRBC COR # BLD: 7.61 10*3/MM3 (ref 4.5–12.5)

## 2018-07-29 PROCEDURE — 85025 COMPLETE CBC W/AUTO DIFF WBC: CPT | Performed by: INTERNAL MEDICINE

## 2018-07-29 PROCEDURE — 82962 GLUCOSE BLOOD TEST: CPT

## 2018-07-29 PROCEDURE — 99232 SBSQ HOSP IP/OBS MODERATE 35: CPT | Performed by: INTERNAL MEDICINE

## 2018-07-29 PROCEDURE — 80053 COMPREHEN METABOLIC PANEL: CPT | Performed by: INTERNAL MEDICINE

## 2018-07-29 PROCEDURE — 84100 ASSAY OF PHOSPHORUS: CPT | Performed by: INTERNAL MEDICINE

## 2018-07-29 PROCEDURE — 83735 ASSAY OF MAGNESIUM: CPT | Performed by: INTERNAL MEDICINE

## 2018-07-29 PROCEDURE — 86140 C-REACTIVE PROTEIN: CPT | Performed by: PHYSICIAN ASSISTANT

## 2018-07-29 PROCEDURE — 63710000001 INSULIN ASPART PER 5 UNITS: Performed by: HOSPITALIST

## 2018-07-29 PROCEDURE — 63710000001 INSULIN DETEMIR PER 5 UNITS: Performed by: PHYSICIAN ASSISTANT

## 2018-07-29 PROCEDURE — 80202 ASSAY OF VANCOMYCIN: CPT

## 2018-07-29 PROCEDURE — 94799 UNLISTED PULMONARY SVC/PX: CPT

## 2018-07-29 PROCEDURE — 25010000002 VANCOMYCIN PER 500 MG: Performed by: HOSPITALIST

## 2018-07-29 PROCEDURE — 85007 BL SMEAR W/DIFF WBC COUNT: CPT | Performed by: INTERNAL MEDICINE

## 2018-07-29 RX ORDER — MAGNESIUM SULFATE HEPTAHYDRATE 40 MG/ML
4 INJECTION, SOLUTION INTRAVENOUS ONCE
Status: COMPLETED | OUTPATIENT
Start: 2018-07-29 | End: 2018-07-29

## 2018-07-29 RX ADMIN — INSULIN ASPART 8 UNITS: 100 INJECTION, SOLUTION INTRAVENOUS; SUBCUTANEOUS at 11:40

## 2018-07-29 RX ADMIN — INSULIN ASPART 5 UNITS: 100 INJECTION, SOLUTION INTRAVENOUS; SUBCUTANEOUS at 09:04

## 2018-07-29 RX ADMIN — METHENAMINE HIPPURATE 1 G: 1 TABLET ORAL at 09:05

## 2018-07-29 RX ADMIN — SUCRALFATE 1 G: 1 TABLET ORAL at 17:07

## 2018-07-29 RX ADMIN — FAMOTIDINE 20 MG: 20 TABLET, FILM COATED ORAL at 09:04

## 2018-07-29 RX ADMIN — TOPIRAMATE 100 MG: 100 TABLET, FILM COATED ORAL at 20:09

## 2018-07-29 RX ADMIN — SUCRALFATE 1 G: 1 TABLET ORAL at 11:40

## 2018-07-29 RX ADMIN — FAMOTIDINE 20 MG: 20 TABLET, FILM COATED ORAL at 20:09

## 2018-07-29 RX ADMIN — MAGNESIUM SULFATE IN WATER 4 G: 40 INJECTION, SOLUTION INTRAVENOUS at 09:04

## 2018-07-29 RX ADMIN — BACLOFEN 30 MG: 10 TABLET ORAL at 09:04

## 2018-07-29 RX ADMIN — INSULIN ASPART 8 UNITS: 100 INJECTION, SOLUTION INTRAVENOUS; SUBCUTANEOUS at 20:09

## 2018-07-29 RX ADMIN — AZTREONAM 2 G: 2 INJECTION, POWDER, FOR SOLUTION INTRAMUSCULAR; INTRAVENOUS at 20:09

## 2018-07-29 RX ADMIN — Medication: at 17:07

## 2018-07-29 RX ADMIN — ATORVASTATIN CALCIUM 10 MG: 10 TABLET, FILM COATED ORAL at 20:08

## 2018-07-29 RX ADMIN — METHENAMINE HIPPURATE 1 G: 1 TABLET ORAL at 22:24

## 2018-07-29 RX ADMIN — AZTREONAM 2 G: 2 INJECTION, POWDER, FOR SOLUTION INTRAMUSCULAR; INTRAVENOUS at 14:19

## 2018-07-29 RX ADMIN — POTASSIUM CHLORIDE 10 MEQ: 750 TABLET, FILM COATED, EXTENDED RELEASE ORAL at 09:04

## 2018-07-29 RX ADMIN — VANCOMYCIN HYDROCHLORIDE 2000 MG: 5 INJECTION, POWDER, LYOPHILIZED, FOR SOLUTION INTRAVENOUS at 17:07

## 2018-07-29 RX ADMIN — ARIPIPRAZOLE 10 MG: 10 TABLET ORAL at 20:09

## 2018-07-29 RX ADMIN — AZTREONAM 2 G: 2 INJECTION, POWDER, FOR SOLUTION INTRAMUSCULAR; INTRAVENOUS at 03:59

## 2018-07-29 RX ADMIN — BACLOFEN 30 MG: 10 TABLET ORAL at 17:06

## 2018-07-29 RX ADMIN — TOPIRAMATE 100 MG: 100 TABLET, FILM COATED ORAL at 17:07

## 2018-07-29 RX ADMIN — GABAPENTIN 800 MG: 400 CAPSULE ORAL at 14:19

## 2018-07-29 RX ADMIN — INSULIN DETEMIR 20 UNITS: 100 INJECTION, SOLUTION SUBCUTANEOUS at 20:13

## 2018-07-29 RX ADMIN — CASTOR OIL AND BALSAM, PERU: 788; 87 OINTMENT TOPICAL at 20:09

## 2018-07-29 RX ADMIN — SUCRALFATE 1 G: 1 TABLET ORAL at 09:04

## 2018-07-29 RX ADMIN — INSULIN ASPART 8 UNITS: 100 INJECTION, SOLUTION INTRAVENOUS; SUBCUTANEOUS at 17:07

## 2018-07-29 RX ADMIN — PANTOPRAZOLE SODIUM 40 MG: 40 TABLET, DELAYED RELEASE ORAL at 06:01

## 2018-07-29 RX ADMIN — DULOXETINE HYDROCHLORIDE 60 MG: 60 CAPSULE, DELAYED RELEASE ORAL at 09:04

## 2018-07-29 RX ADMIN — Medication: at 20:09

## 2018-07-29 RX ADMIN — CASTOR OIL AND BALSAM, PERU: 788; 87 OINTMENT TOPICAL at 09:05

## 2018-07-29 RX ADMIN — SUCRALFATE 1 G: 1 TABLET ORAL at 20:09

## 2018-07-29 RX ADMIN — DULOXETINE HYDROCHLORIDE 60 MG: 60 CAPSULE, DELAYED RELEASE ORAL at 20:09

## 2018-07-29 RX ADMIN — GABAPENTIN 800 MG: 400 CAPSULE ORAL at 20:08

## 2018-07-29 RX ADMIN — BACLOFEN 30 MG: 10 TABLET ORAL at 20:08

## 2018-07-29 RX ADMIN — Medication: at 09:05

## 2018-07-29 RX ADMIN — VANCOMYCIN HYDROCHLORIDE 2000 MG: 5 INJECTION, POWDER, LYOPHILIZED, FOR SOLUTION INTRAVENOUS at 07:02

## 2018-07-29 RX ADMIN — SODIUM CHLORIDE 100 ML/HR: 9 INJECTION, SOLUTION INTRAVENOUS at 14:19

## 2018-07-29 RX ADMIN — TOPIRAMATE 100 MG: 100 TABLET, FILM COATED ORAL at 09:04

## 2018-07-29 RX ADMIN — BACLOFEN 30 MG: 10 TABLET ORAL at 11:40

## 2018-07-30 LAB
ANION GAP SERPL CALCULATED.3IONS-SCNC: 6 MMOL/L (ref 3.6–11.2)
BASOPHILS # BLD AUTO: 0.01 10*3/MM3 (ref 0–0.3)
BASOPHILS NFR BLD AUTO: 0.1 % (ref 0–2)
BUN BLD-MCNC: 10 MG/DL (ref 7–21)
BUN/CREAT SERPL: 13.7 (ref 7–25)
CALCIUM SPEC-SCNC: 8.6 MG/DL (ref 7.7–10)
CHLORIDE SERPL-SCNC: 111 MMOL/L (ref 99–112)
CO2 SERPL-SCNC: 21 MMOL/L (ref 24.3–31.9)
CREAT BLD-MCNC: 0.73 MG/DL (ref 0.43–1.29)
CRP SERPL-MCNC: 3.27 MG/DL (ref 0–0.99)
DEPRECATED RDW RBC AUTO: 46.5 FL (ref 37–54)
EOSINOPHIL # BLD AUTO: 0.22 10*3/MM3 (ref 0–0.7)
EOSINOPHIL NFR BLD AUTO: 3.1 % (ref 0–5)
ERYTHROCYTE [DISTWIDTH] IN BLOOD BY AUTOMATED COUNT: 14.1 % (ref 11.5–14.5)
GFR SERPL CREATININE-BSD FRML MDRD: 119 ML/MIN/1.73
GLUCOSE BLD-MCNC: 209 MG/DL (ref 70–110)
GLUCOSE BLDC GLUCOMTR-MCNC: 209 MG/DL (ref 70–130)
GLUCOSE BLDC GLUCOMTR-MCNC: 246 MG/DL (ref 70–130)
GLUCOSE BLDC GLUCOMTR-MCNC: 254 MG/DL (ref 70–130)
GLUCOSE BLDC GLUCOMTR-MCNC: 261 MG/DL (ref 70–130)
GLUCOSE BLDC GLUCOMTR-MCNC: 262 MG/DL (ref 70–130)
GLUCOSE BLDC GLUCOMTR-MCNC: 299 MG/DL (ref 70–130)
HCT VFR BLD AUTO: 38.6 % (ref 42–52)
HGB BLD-MCNC: 11.7 G/DL (ref 14–18)
IMM GRANULOCYTES # BLD: 0.02 10*3/MM3 (ref 0–0.03)
IMM GRANULOCYTES NFR BLD: 0.3 % (ref 0–0.5)
LYMPHOCYTES # BLD AUTO: 2.26 10*3/MM3 (ref 1–3)
LYMPHOCYTES NFR BLD AUTO: 31.7 % (ref 21–51)
MCH RBC QN AUTO: 27.4 PG (ref 27–33)
MCHC RBC AUTO-ENTMCNC: 30.3 G/DL (ref 33–37)
MCV RBC AUTO: 90.4 FL (ref 80–94)
MONOCYTES # BLD AUTO: 0.27 10*3/MM3 (ref 0.1–0.9)
MONOCYTES NFR BLD AUTO: 3.8 % (ref 0–10)
NEUTROPHILS # BLD AUTO: 4.36 10*3/MM3 (ref 1.4–6.5)
NEUTROPHILS NFR BLD AUTO: 61 % (ref 30–70)
OSMOLALITY SERPL CALC.SUM OF ELEC: 280.9 MOSM/KG (ref 273–305)
PLATELET # BLD AUTO: 335 10*3/MM3 (ref 130–400)
PMV BLD AUTO: 11 FL (ref 6–10)
POTASSIUM BLD-SCNC: 3.5 MMOL/L (ref 3.5–5.3)
POTASSIUM BLD-SCNC: 4.1 MMOL/L (ref 3.5–5.3)
RBC # BLD AUTO: 4.27 10*6/MM3 (ref 4.7–6.1)
SODIUM BLD-SCNC: 138 MMOL/L (ref 135–153)
WBC NRBC COR # BLD: 7.14 10*3/MM3 (ref 4.5–12.5)

## 2018-07-30 PROCEDURE — 85025 COMPLETE CBC W/AUTO DIFF WBC: CPT | Performed by: INTERNAL MEDICINE

## 2018-07-30 PROCEDURE — 86140 C-REACTIVE PROTEIN: CPT | Performed by: PHYSICIAN ASSISTANT

## 2018-07-30 PROCEDURE — 94799 UNLISTED PULMONARY SVC/PX: CPT

## 2018-07-30 PROCEDURE — 80048 BASIC METABOLIC PNL TOTAL CA: CPT | Performed by: INTERNAL MEDICINE

## 2018-07-30 PROCEDURE — 82962 GLUCOSE BLOOD TEST: CPT

## 2018-07-30 PROCEDURE — 84132 ASSAY OF SERUM POTASSIUM: CPT | Performed by: INTERNAL MEDICINE

## 2018-07-30 PROCEDURE — 94660 CPAP INITIATION&MGMT: CPT

## 2018-07-30 PROCEDURE — 99232 SBSQ HOSP IP/OBS MODERATE 35: CPT | Performed by: INTERNAL MEDICINE

## 2018-07-30 PROCEDURE — 63710000001 INSULIN ASPART PER 5 UNITS: Performed by: HOSPITALIST

## 2018-07-30 PROCEDURE — 63710000001 INSULIN DETEMIR PER 5 UNITS: Performed by: INTERNAL MEDICINE

## 2018-07-30 PROCEDURE — 25010000002 VANCOMYCIN PER 500 MG: Performed by: HOSPITALIST

## 2018-07-30 RX ORDER — POTASSIUM CHLORIDE 20 MEQ/1
40 TABLET, EXTENDED RELEASE ORAL EVERY 4 HOURS
Status: COMPLETED | OUTPATIENT
Start: 2018-07-30 | End: 2018-07-30

## 2018-07-30 RX ADMIN — TOPIRAMATE 100 MG: 100 TABLET, FILM COATED ORAL at 20:15

## 2018-07-30 RX ADMIN — POTASSIUM CHLORIDE 40 MEQ: 1500 TABLET, EXTENDED RELEASE ORAL at 08:27

## 2018-07-30 RX ADMIN — GABAPENTIN 800 MG: 400 CAPSULE ORAL at 13:32

## 2018-07-30 RX ADMIN — INSULIN ASPART 5 UNITS: 100 INJECTION, SOLUTION INTRAVENOUS; SUBCUTANEOUS at 08:28

## 2018-07-30 RX ADMIN — BACLOFEN 30 MG: 10 TABLET ORAL at 12:05

## 2018-07-30 RX ADMIN — GABAPENTIN 800 MG: 400 CAPSULE ORAL at 20:15

## 2018-07-30 RX ADMIN — TOPIRAMATE 100 MG: 100 TABLET, FILM COATED ORAL at 08:27

## 2018-07-30 RX ADMIN — AZTREONAM 2 G: 2 INJECTION, POWDER, FOR SOLUTION INTRAMUSCULAR; INTRAVENOUS at 13:32

## 2018-07-30 RX ADMIN — Medication: at 20:15

## 2018-07-30 RX ADMIN — ATORVASTATIN CALCIUM 10 MG: 10 TABLET, FILM COATED ORAL at 20:15

## 2018-07-30 RX ADMIN — SUCRALFATE 1 G: 1 TABLET ORAL at 17:05

## 2018-07-30 RX ADMIN — ARIPIPRAZOLE 10 MG: 10 TABLET ORAL at 20:15

## 2018-07-30 RX ADMIN — CASTOR OIL AND BALSAM, PERU: 788; 87 OINTMENT TOPICAL at 20:15

## 2018-07-30 RX ADMIN — FAMOTIDINE 20 MG: 20 TABLET, FILM COATED ORAL at 20:15

## 2018-07-30 RX ADMIN — METHENAMINE HIPPURATE 1 G: 1 TABLET ORAL at 08:27

## 2018-07-30 RX ADMIN — DULOXETINE HYDROCHLORIDE 60 MG: 60 CAPSULE, DELAYED RELEASE ORAL at 20:15

## 2018-07-30 RX ADMIN — SUCRALFATE 1 G: 1 TABLET ORAL at 08:27

## 2018-07-30 RX ADMIN — VANCOMYCIN HYDROCHLORIDE 2000 MG: 5 INJECTION, POWDER, LYOPHILIZED, FOR SOLUTION INTRAVENOUS at 17:05

## 2018-07-30 RX ADMIN — INSULIN ASPART 8 UNITS: 100 INJECTION, SOLUTION INTRAVENOUS; SUBCUTANEOUS at 12:05

## 2018-07-30 RX ADMIN — VANCOMYCIN HYDROCHLORIDE 2000 MG: 5 INJECTION, POWDER, LYOPHILIZED, FOR SOLUTION INTRAVENOUS at 05:06

## 2018-07-30 RX ADMIN — POTASSIUM CHLORIDE 10 MEQ: 750 TABLET, FILM COATED, EXTENDED RELEASE ORAL at 08:27

## 2018-07-30 RX ADMIN — SODIUM CHLORIDE 100 ML/HR: 9 INJECTION, SOLUTION INTRAVENOUS at 12:05

## 2018-07-30 RX ADMIN — INSULIN ASPART 5 UNITS: 100 INJECTION, SOLUTION INTRAVENOUS; SUBCUTANEOUS at 17:04

## 2018-07-30 RX ADMIN — SUCRALFATE 1 G: 1 TABLET ORAL at 20:15

## 2018-07-30 RX ADMIN — INSULIN DETEMIR 15 UNITS: 100 INJECTION, SOLUTION SUBCUTANEOUS at 21:00

## 2018-07-30 RX ADMIN — DULOXETINE HYDROCHLORIDE 60 MG: 60 CAPSULE, DELAYED RELEASE ORAL at 08:27

## 2018-07-30 RX ADMIN — ALBUTEROL SULFATE 2.5 MG: 2.5 SOLUTION RESPIRATORY (INHALATION) at 00:46

## 2018-07-30 RX ADMIN — Medication: at 08:28

## 2018-07-30 RX ADMIN — SODIUM CHLORIDE 100 ML/HR: 9 INJECTION, SOLUTION INTRAVENOUS at 05:06

## 2018-07-30 RX ADMIN — BACLOFEN 30 MG: 10 TABLET ORAL at 17:05

## 2018-07-30 RX ADMIN — METFORMIN HYDROCHLORIDE 500 MG: 500 TABLET ORAL at 17:08

## 2018-07-30 RX ADMIN — Medication: at 17:07

## 2018-07-30 RX ADMIN — CASTOR OIL AND BALSAM, PERU: 788; 87 OINTMENT TOPICAL at 08:27

## 2018-07-30 RX ADMIN — SUCRALFATE 1 G: 1 TABLET ORAL at 12:05

## 2018-07-30 RX ADMIN — AZTREONAM 2 G: 2 INJECTION, POWDER, FOR SOLUTION INTRAMUSCULAR; INTRAVENOUS at 05:06

## 2018-07-30 RX ADMIN — BACLOFEN 30 MG: 10 TABLET ORAL at 20:15

## 2018-07-30 RX ADMIN — POTASSIUM CHLORIDE 40 MEQ: 1500 TABLET, EXTENDED RELEASE ORAL at 05:06

## 2018-07-30 RX ADMIN — FAMOTIDINE 20 MG: 20 TABLET, FILM COATED ORAL at 08:27

## 2018-07-30 RX ADMIN — METHENAMINE HIPPURATE 1 G: 1 TABLET ORAL at 20:16

## 2018-07-30 RX ADMIN — TOPIRAMATE 100 MG: 100 TABLET, FILM COATED ORAL at 17:07

## 2018-07-30 RX ADMIN — INSULIN ASPART 8 UNITS: 100 INJECTION, SOLUTION INTRAVENOUS; SUBCUTANEOUS at 20:15

## 2018-07-30 RX ADMIN — BACLOFEN 30 MG: 10 TABLET ORAL at 08:27

## 2018-07-31 VITALS
WEIGHT: 237.2 LBS | BODY MASS INDEX: 33.21 KG/M2 | SYSTOLIC BLOOD PRESSURE: 126 MMHG | HEART RATE: 66 BPM | DIASTOLIC BLOOD PRESSURE: 80 MMHG | OXYGEN SATURATION: 96 % | RESPIRATION RATE: 20 BRPM | TEMPERATURE: 98.3 F | HEIGHT: 71 IN

## 2018-07-31 LAB
ANION GAP SERPL CALCULATED.3IONS-SCNC: 3.4 MMOL/L (ref 3.6–11.2)
BACTERIA SPEC AEROBE CULT: ABNORMAL
BUN BLD-MCNC: 13 MG/DL (ref 7–21)
BUN/CREAT SERPL: 17.8 (ref 7–25)
CALCIUM SPEC-SCNC: 8.6 MG/DL (ref 7.7–10)
CHLORIDE SERPL-SCNC: 111 MMOL/L (ref 99–112)
CO2 SERPL-SCNC: 23.6 MMOL/L (ref 24.3–31.9)
CREAT BLD-MCNC: 0.73 MG/DL (ref 0.43–1.29)
CRP SERPL-MCNC: 2.89 MG/DL (ref 0–0.99)
GFR SERPL CREATININE-BSD FRML MDRD: 119 ML/MIN/1.73
GLUCOSE BLD-MCNC: 215 MG/DL (ref 70–110)
GLUCOSE BLDC GLUCOMTR-MCNC: 183 MG/DL (ref 70–130)
GLUCOSE BLDC GLUCOMTR-MCNC: 207 MG/DL (ref 70–130)
GLUCOSE BLDC GLUCOMTR-MCNC: 280 MG/DL (ref 70–130)
GRAM STN SPEC: ABNORMAL
MAGNESIUM SERPL-MCNC: 1.7 MG/DL (ref 1.7–2.6)
OSMOLALITY SERPL CALC.SUM OF ELEC: 282.3 MOSM/KG (ref 273–305)
POTASSIUM BLD-SCNC: 3.9 MMOL/L (ref 3.5–5.3)
SODIUM BLD-SCNC: 138 MMOL/L (ref 135–153)
STREP GROUPING: ABNORMAL
STREP GROUPING: ABNORMAL

## 2018-07-31 PROCEDURE — 82962 GLUCOSE BLOOD TEST: CPT

## 2018-07-31 PROCEDURE — 05H533Z INSERTION OF INFUSION DEVICE INTO RIGHT SUBCLAVIAN VEIN, PERCUTANEOUS APPROACH: ICD-10-PCS | Performed by: INTERNAL MEDICINE

## 2018-07-31 PROCEDURE — 63710000001 INSULIN ASPART PER 5 UNITS: Performed by: HOSPITALIST

## 2018-07-31 PROCEDURE — 94799 UNLISTED PULMONARY SVC/PX: CPT

## 2018-07-31 PROCEDURE — 94660 CPAP INITIATION&MGMT: CPT

## 2018-07-31 PROCEDURE — 86140 C-REACTIVE PROTEIN: CPT | Performed by: PHYSICIAN ASSISTANT

## 2018-07-31 PROCEDURE — 83735 ASSAY OF MAGNESIUM: CPT | Performed by: INTERNAL MEDICINE

## 2018-07-31 PROCEDURE — C1751 CATH, INF, PER/CENT/MIDLINE: HCPCS

## 2018-07-31 PROCEDURE — 63710000001 INSULIN DETEMIR PER 5 UNITS: Performed by: INTERNAL MEDICINE

## 2018-07-31 PROCEDURE — 25010000002 VANCOMYCIN PER 500 MG: Performed by: HOSPITALIST

## 2018-07-31 PROCEDURE — 99239 HOSP IP/OBS DSCHRG MGMT >30: CPT | Performed by: INTERNAL MEDICINE

## 2018-07-31 PROCEDURE — 80048 BASIC METABOLIC PNL TOTAL CA: CPT | Performed by: INTERNAL MEDICINE

## 2018-07-31 RX ORDER — INSULIN GLARGINE 100 [IU]/ML
15 INJECTION, SOLUTION SUBCUTANEOUS 2 TIMES DAILY
Qty: 900 UNITS | Refills: 0 | Status: SHIPPED | OUTPATIENT
Start: 2018-07-31 | End: 2018-08-30

## 2018-07-31 RX ORDER — SODIUM CHLORIDE 0.9 % (FLUSH) 0.9 %
10 SYRINGE (ML) INJECTION AS NEEDED
Status: DISCONTINUED | OUTPATIENT
Start: 2018-07-31 | End: 2018-07-31 | Stop reason: HOSPADM

## 2018-07-31 RX ORDER — SODIUM CHLORIDE 0.9 % (FLUSH) 0.9 %
20 SYRINGE (ML) INJECTION AS NEEDED
Status: DISCONTINUED | OUTPATIENT
Start: 2018-07-31 | End: 2018-07-31 | Stop reason: HOSPADM

## 2018-07-31 RX ORDER — MAGNESIUM HYDROXIDE 1200 MG/15ML
LIQUID ORAL
Status: COMPLETED
Start: 2018-07-31 | End: 2018-07-31

## 2018-07-31 RX ORDER — DULOXETIN HYDROCHLORIDE 60 MG/1
60 CAPSULE, DELAYED RELEASE ORAL DAILY
Status: ON HOLD
Start: 2018-07-31 | End: 2019-10-19 | Stop reason: DRUGHIGH

## 2018-07-31 RX ORDER — MAGNESIUM SULFATE HEPTAHYDRATE 40 MG/ML
4 INJECTION, SOLUTION INTRAVENOUS ONCE
Status: COMPLETED | OUTPATIENT
Start: 2018-07-31 | End: 2018-07-31

## 2018-07-31 RX ADMIN — METHENAMINE HIPPURATE 1 G: 1 TABLET ORAL at 08:44

## 2018-07-31 RX ADMIN — GABAPENTIN 800 MG: 400 CAPSULE ORAL at 13:44

## 2018-07-31 RX ADMIN — DULOXETINE HYDROCHLORIDE 60 MG: 60 CAPSULE, DELAYED RELEASE ORAL at 08:44

## 2018-07-31 RX ADMIN — GABAPENTIN 800 MG: 400 CAPSULE ORAL at 05:19

## 2018-07-31 RX ADMIN — INSULIN ASPART 8 UNITS: 100 INJECTION, SOLUTION INTRAVENOUS; SUBCUTANEOUS at 12:13

## 2018-07-31 RX ADMIN — BACLOFEN 30 MG: 10 TABLET ORAL at 12:12

## 2018-07-31 RX ADMIN — POTASSIUM CHLORIDE 10 MEQ: 750 TABLET, FILM COATED, EXTENDED RELEASE ORAL at 08:44

## 2018-07-31 RX ADMIN — Medication: at 08:45

## 2018-07-31 RX ADMIN — TOPIRAMATE 100 MG: 100 TABLET, FILM COATED ORAL at 08:44

## 2018-07-31 RX ADMIN — FAMOTIDINE 20 MG: 20 TABLET, FILM COATED ORAL at 08:44

## 2018-07-31 RX ADMIN — PANTOPRAZOLE SODIUM 40 MG: 40 TABLET, DELAYED RELEASE ORAL at 05:21

## 2018-07-31 RX ADMIN — INSULIN DETEMIR 15 UNITS: 100 INJECTION, SOLUTION SUBCUTANEOUS at 08:45

## 2018-07-31 RX ADMIN — SODIUM CHLORIDE 1000 ML: 900 IRRIGANT IRRIGATION at 12:14

## 2018-07-31 RX ADMIN — INSULIN ASPART 5 UNITS: 100 INJECTION, SOLUTION INTRAVENOUS; SUBCUTANEOUS at 08:45

## 2018-07-31 RX ADMIN — BACLOFEN 30 MG: 10 TABLET ORAL at 08:44

## 2018-07-31 RX ADMIN — SUCRALFATE 1 G: 1 TABLET ORAL at 08:44

## 2018-07-31 RX ADMIN — CASTOR OIL AND BALSAM, PERU: 788; 87 OINTMENT TOPICAL at 08:45

## 2018-07-31 RX ADMIN — METFORMIN HYDROCHLORIDE 500 MG: 500 TABLET ORAL at 08:44

## 2018-07-31 RX ADMIN — MAGNESIUM SULFATE IN WATER 4 G: 40 INJECTION, SOLUTION INTRAVENOUS at 04:26

## 2018-07-31 RX ADMIN — SUCRALFATE 1 G: 1 TABLET ORAL at 12:12

## 2018-07-31 RX ADMIN — VANCOMYCIN HYDROCHLORIDE 2000 MG: 5 INJECTION, POWDER, LYOPHILIZED, FOR SOLUTION INTRAVENOUS at 05:19

## 2018-08-01 LAB
BACTERIA SPEC AEROBE CULT: NORMAL
BACTERIA SPEC AEROBE CULT: NORMAL

## 2018-08-18 ENCOUNTER — APPOINTMENT (OUTPATIENT)
Dept: CT IMAGING | Facility: HOSPITAL | Age: 43
End: 2018-08-18

## 2018-08-18 ENCOUNTER — APPOINTMENT (OUTPATIENT)
Dept: GENERAL RADIOLOGY | Facility: HOSPITAL | Age: 43
End: 2018-08-18

## 2018-08-18 ENCOUNTER — HOSPITAL ENCOUNTER (EMERGENCY)
Facility: HOSPITAL | Age: 43
Discharge: ANOTHER HOSPITAL | End: 2018-08-19
Attending: FAMILY MEDICINE | Admitting: FAMILY MEDICINE

## 2018-08-18 DIAGNOSIS — E83.42 HYPOMAGNESEMIA: ICD-10-CM

## 2018-08-18 DIAGNOSIS — I95.9 SEPSIS ASSOCIATED HYPOTENSION (HCC): ICD-10-CM

## 2018-08-18 DIAGNOSIS — N17.9 AKI (ACUTE KIDNEY INJURY) (HCC): ICD-10-CM

## 2018-08-18 DIAGNOSIS — A41.9 SEPSIS ASSOCIATED HYPOTENSION (HCC): ICD-10-CM

## 2018-08-18 DIAGNOSIS — I21.4 NSTEMI (NON-ST ELEVATED MYOCARDIAL INFARCTION) (HCC): Primary | ICD-10-CM

## 2018-08-18 LAB
A-A DO2: 46.7 MMHG (ref 0–300)
ALBUMIN SERPL-MCNC: 4.5 G/DL (ref 3.5–5)
ALBUMIN/GLOB SERPL: 1.1 G/DL (ref 1.5–2.5)
ALP SERPL-CCNC: 81 U/L (ref 40–129)
ALT SERPL W P-5'-P-CCNC: 49 U/L (ref 10–44)
ANION GAP SERPL CALCULATED.3IONS-SCNC: 10.1 MMOL/L (ref 3.6–11.2)
APTT PPP: 30.4 SECONDS (ref 23.8–36.1)
ARTERIAL PATENCY WRIST A: POSITIVE
AST SERPL-CCNC: 31 U/L (ref 10–34)
ATMOSPHERIC PRESS: 726 MMHG
BASE EXCESS BLDA CALC-SCNC: -7.1 MMOL/L
BASOPHILS # BLD AUTO: 0.03 10*3/MM3 (ref 0–0.3)
BASOPHILS NFR BLD AUTO: 0.2 % (ref 0–2)
BDY SITE: ABNORMAL
BILIRUB SERPL-MCNC: 0.2 MG/DL (ref 0.2–1.8)
BNP SERPL-MCNC: 184 PG/ML (ref 0–100)
BODY TEMPERATURE: 98.6 C
BUN BLD-MCNC: 21 MG/DL (ref 7–21)
BUN/CREAT SERPL: 10.6 (ref 7–25)
CALCIUM SPEC-SCNC: 10.3 MG/DL (ref 7.7–10)
CHLORIDE SERPL-SCNC: 111 MMOL/L (ref 99–112)
CHOLEST SERPL-MCNC: 150 MG/DL (ref 0–200)
CK SERPL-CCNC: 63 U/L (ref 24–204)
CO2 SERPL-SCNC: 18.9 MMOL/L (ref 24.3–31.9)
COHGB MFR BLD: 1.8 % (ref 0–5)
CREAT BLD-MCNC: 1.99 MG/DL (ref 0.43–1.29)
CRP SERPL-MCNC: 5 MG/DL (ref 0–0.99)
D-LACTATE SERPL-SCNC: 2.3 MMOL/L (ref 0.5–2)
DEPRECATED RDW RBC AUTO: 46.3 FL (ref 37–54)
EOSINOPHIL # BLD AUTO: 0.14 10*3/MM3 (ref 0–0.7)
EOSINOPHIL NFR BLD AUTO: 0.9 % (ref 0–5)
ERYTHROCYTE [DISTWIDTH] IN BLOOD BY AUTOMATED COUNT: 14.3 % (ref 11.5–14.5)
GFR SERPL CREATININE-BSD FRML MDRD: 37 ML/MIN/1.73
GLOBULIN UR ELPH-MCNC: 4.1 GM/DL
GLUCOSE BLD-MCNC: 156 MG/DL (ref 70–110)
HCO3 BLDA-SCNC: 17.6 MMOL/L (ref 22–26)
HCT VFR BLD AUTO: 43 % (ref 42–52)
HCT VFR BLD CALC: 38 % (ref 42–52)
HDLC SERPL-MCNC: 40 MG/DL (ref 60–100)
HGB BLD-MCNC: 13 G/DL (ref 14–18)
HGB BLDA-MCNC: 12.9 G/DL (ref 12–16)
HOROWITZ INDEX BLD+IHG-RTO: 21 %
IMM GRANULOCYTES # BLD: 0.05 10*3/MM3 (ref 0–0.03)
IMM GRANULOCYTES NFR BLD: 0.3 % (ref 0–0.5)
INR PPP: 1.11 (ref 0.9–1.1)
LDLC SERPL CALC-MCNC: 77 MG/DL (ref 0–100)
LDLC/HDLC SERPL: 1.94 {RATIO}
LYMPHOCYTES # BLD AUTO: 2.29 10*3/MM3 (ref 1–3)
LYMPHOCYTES NFR BLD AUTO: 15.5 % (ref 21–51)
MAGNESIUM SERPL-MCNC: 1.5 MG/DL (ref 1.7–2.6)
MCH RBC QN AUTO: 27.7 PG (ref 27–33)
MCHC RBC AUTO-ENTMCNC: 30.2 G/DL (ref 33–37)
MCV RBC AUTO: 91.5 FL (ref 80–94)
METHGB BLD QL: 0.2 % (ref 0–3)
MODALITY: ABNORMAL
MONOCYTES # BLD AUTO: 0.72 10*3/MM3 (ref 0.1–0.9)
MONOCYTES NFR BLD AUTO: 4.9 % (ref 0–10)
MYOGLOBIN SERPL-MCNC: 131 NG/ML (ref 0–109)
NEUTROPHILS # BLD AUTO: 11.58 10*3/MM3 (ref 1.4–6.5)
NEUTROPHILS NFR BLD AUTO: 78.2 % (ref 30–70)
OSMOLALITY SERPL CALC.SUM OF ELEC: 285.6 MOSM/KG (ref 273–305)
OXYHGB MFR BLDV: 84.9 % (ref 85–100)
PCO2 BLDA: 32.8 MM HG (ref 35–45)
PH BLDA: 7.35 PH UNITS (ref 7.35–7.45)
PHOSPHATE SERPL-MCNC: 4.9 MG/DL (ref 2.7–4.5)
PLATELET # BLD AUTO: 311 10*3/MM3 (ref 130–400)
PMV BLD AUTO: 10.5 FL (ref 6–10)
PO2 BLDA: 56.6 MM HG (ref 80–100)
POTASSIUM BLD-SCNC: 3.6 MMOL/L (ref 3.5–5.3)
PROT SERPL-MCNC: 8.6 G/DL (ref 6–8)
PROTHROMBIN TIME: 14.5 SECONDS (ref 11–15.4)
RBC # BLD AUTO: 4.7 10*6/MM3 (ref 4.7–6.1)
SAO2 % BLDCOA: 86.6 % (ref 90–100)
SODIUM BLD-SCNC: 140 MMOL/L (ref 135–153)
TRIGL SERPL-MCNC: 163 MG/DL (ref 0–150)
TROPONIN I SERPL-MCNC: 2.14 NG/ML
VLDLC SERPL-MCNC: 32.6 MG/DL
WBC NRBC COR # BLD: 14.81 10*3/MM3 (ref 4.5–12.5)

## 2018-08-18 PROCEDURE — 96366 THER/PROPH/DIAG IV INF ADDON: CPT

## 2018-08-18 PROCEDURE — 80061 LIPID PANEL: CPT | Performed by: FAMILY MEDICINE

## 2018-08-18 PROCEDURE — 93005 ELECTROCARDIOGRAM TRACING: CPT | Performed by: FAMILY MEDICINE

## 2018-08-18 PROCEDURE — 74176 CT ABD & PELVIS W/O CONTRAST: CPT | Performed by: RADIOLOGY

## 2018-08-18 PROCEDURE — 85730 THROMBOPLASTIN TIME PARTIAL: CPT | Performed by: FAMILY MEDICINE

## 2018-08-18 PROCEDURE — 99285 EMERGENCY DEPT VISIT HI MDM: CPT

## 2018-08-18 PROCEDURE — 36600 WITHDRAWAL OF ARTERIAL BLOOD: CPT | Performed by: FAMILY MEDICINE

## 2018-08-18 PROCEDURE — 74176 CT ABD & PELVIS W/O CONTRAST: CPT

## 2018-08-18 PROCEDURE — 83605 ASSAY OF LACTIC ACID: CPT | Performed by: FAMILY MEDICINE

## 2018-08-18 PROCEDURE — 85025 COMPLETE CBC W/AUTO DIFF WBC: CPT | Performed by: FAMILY MEDICINE

## 2018-08-18 PROCEDURE — 96361 HYDRATE IV INFUSION ADD-ON: CPT

## 2018-08-18 PROCEDURE — 71045 X-RAY EXAM CHEST 1 VIEW: CPT

## 2018-08-18 PROCEDURE — 85610 PROTHROMBIN TIME: CPT | Performed by: FAMILY MEDICINE

## 2018-08-18 PROCEDURE — 84484 ASSAY OF TROPONIN QUANT: CPT | Performed by: FAMILY MEDICINE

## 2018-08-18 PROCEDURE — 86140 C-REACTIVE PROTEIN: CPT | Performed by: FAMILY MEDICINE

## 2018-08-18 PROCEDURE — 82550 ASSAY OF CK (CPK): CPT | Performed by: FAMILY MEDICINE

## 2018-08-18 PROCEDURE — 83735 ASSAY OF MAGNESIUM: CPT | Performed by: FAMILY MEDICINE

## 2018-08-18 PROCEDURE — 25010000002 VANCOMYCIN PER 500 MG: Performed by: FAMILY MEDICINE

## 2018-08-18 PROCEDURE — 80053 COMPREHEN METABOLIC PANEL: CPT | Performed by: FAMILY MEDICINE

## 2018-08-18 PROCEDURE — 87040 BLOOD CULTURE FOR BACTERIA: CPT | Performed by: FAMILY MEDICINE

## 2018-08-18 PROCEDURE — 36415 COLL VENOUS BLD VENIPUNCTURE: CPT

## 2018-08-18 PROCEDURE — 83874 ASSAY OF MYOGLOBIN: CPT | Performed by: FAMILY MEDICINE

## 2018-08-18 PROCEDURE — 83880 ASSAY OF NATRIURETIC PEPTIDE: CPT | Performed by: FAMILY MEDICINE

## 2018-08-18 PROCEDURE — 71045 X-RAY EXAM CHEST 1 VIEW: CPT | Performed by: RADIOLOGY

## 2018-08-18 PROCEDURE — 83050 HGB METHEMOGLOBIN QUAN: CPT | Performed by: FAMILY MEDICINE

## 2018-08-18 PROCEDURE — 84100 ASSAY OF PHOSPHORUS: CPT | Performed by: FAMILY MEDICINE

## 2018-08-18 PROCEDURE — 96365 THER/PROPH/DIAG IV INF INIT: CPT

## 2018-08-18 PROCEDURE — 82805 BLOOD GASES W/O2 SATURATION: CPT | Performed by: FAMILY MEDICINE

## 2018-08-18 PROCEDURE — 82375 ASSAY CARBOXYHB QUANT: CPT | Performed by: FAMILY MEDICINE

## 2018-08-18 RX ORDER — ATORVASTATIN CALCIUM 40 MG/1
80 TABLET, FILM COATED ORAL ONCE
Status: COMPLETED | OUTPATIENT
Start: 2018-08-18 | End: 2018-08-18

## 2018-08-18 RX ORDER — SODIUM CHLORIDE 0.9 % (FLUSH) 0.9 %
10 SYRINGE (ML) INJECTION AS NEEDED
Status: DISCONTINUED | OUTPATIENT
Start: 2018-08-18 | End: 2018-08-19 | Stop reason: HOSPADM

## 2018-08-18 RX ORDER — SODIUM CHLORIDE 9 MG/ML
INJECTION, SOLUTION INTRAVENOUS
Status: COMPLETED
Start: 2018-08-18 | End: 2018-08-18

## 2018-08-18 RX ORDER — ASPIRIN 81 MG/1
324 TABLET, CHEWABLE ORAL ONCE
Status: COMPLETED | OUTPATIENT
Start: 2018-08-18 | End: 2018-08-18

## 2018-08-18 RX ADMIN — SODIUM CHLORIDE 2328 ML: 9 INJECTION, SOLUTION INTRAVENOUS at 22:19

## 2018-08-18 RX ADMIN — ATORVASTATIN CALCIUM 80 MG: 40 TABLET, FILM COATED ORAL at 23:23

## 2018-08-18 RX ADMIN — VANCOMYCIN HYDROCHLORIDE 2000 MG: 5 INJECTION, POWDER, LYOPHILIZED, FOR SOLUTION INTRAVENOUS at 23:17

## 2018-08-18 RX ADMIN — TICAGRELOR 180 MG: 90 TABLET ORAL at 23:25

## 2018-08-18 RX ADMIN — ASPIRIN 324 MG: 81 TABLET, CHEWABLE ORAL at 23:21

## 2018-08-19 ENCOUNTER — APPOINTMENT (OUTPATIENT)
Dept: CT IMAGING | Facility: HOSPITAL | Age: 43
End: 2018-08-19

## 2018-08-19 VITALS
OXYGEN SATURATION: 99 % | WEIGHT: 300 LBS | SYSTOLIC BLOOD PRESSURE: 134 MMHG | BODY MASS INDEX: 40.63 KG/M2 | DIASTOLIC BLOOD PRESSURE: 82 MMHG | HEIGHT: 72 IN | HEART RATE: 109 BPM | RESPIRATION RATE: 18 BRPM | TEMPERATURE: 98 F

## 2018-08-19 LAB
6-ACETYL MORPHINE: NEGATIVE
A-A DO2: 43.6 MMHG (ref 0–300)
AMPHET+METHAMPHET UR QL: NEGATIVE
ARTERIAL PATENCY WRIST A: POSITIVE
ATMOSPHERIC PRESS: 726 MMHG
BACTERIA UR QL AUTO: ABNORMAL /HPF
BARBITURATES UR QL SCN: NEGATIVE
BASE EXCESS BLDA CALC-SCNC: -13.3 MMOL/L
BDY SITE: ABNORMAL
BENZODIAZ UR QL SCN: NEGATIVE
BILIRUB UR QL STRIP: NEGATIVE
BODY TEMPERATURE: 98.6 C
BUPRENORPHINE SERPL-MCNC: NEGATIVE NG/ML
CANNABINOIDS SERPL QL: NEGATIVE
CK MB SERPL-CCNC: 9.94 NG/ML (ref 0–5)
CLARITY UR: ABNORMAL
COCAINE UR QL: NEGATIVE
COHGB MFR BLD: 1 % (ref 0–5)
COLOR UR: YELLOW
GAS FLOW AIRWAY: 2 LPM
GLUCOSE BLDC GLUCOMTR-MCNC: 95 MG/DL (ref 70–130)
GLUCOSE UR STRIP-MCNC: NEGATIVE MG/DL
GRAN CASTS URNS QL MICRO: ABNORMAL /LPF
HCO3 BLDA-SCNC: 11.8 MMOL/L (ref 22–26)
HCT VFR BLD CALC: 34 % (ref 42–52)
HGB BLDA-MCNC: 11.7 G/DL (ref 12–16)
HGB UR QL STRIP.AUTO: ABNORMAL
HOLD SPECIMEN: NORMAL
HOROWITZ INDEX BLD+IHG-RTO: 28 %
HYALINE CASTS UR QL AUTO: ABNORMAL /LPF
KETONES UR QL STRIP: ABNORMAL
LEUKOCYTE ESTERASE UR QL STRIP.AUTO: ABNORMAL
METHADONE UR QL SCN: NEGATIVE
METHGB BLD QL: 0.3 % (ref 0–3)
MODALITY: ABNORMAL
NITRITE UR QL STRIP: NEGATIVE
OPIATES UR QL: NEGATIVE
OXYCODONE UR QL SCN: NEGATIVE
OXYHGB MFR BLDV: 96.5 % (ref 85–100)
PCO2 BLDA: 25.5 MM HG (ref 35–45)
PCP UR QL SCN: NEGATIVE
PH BLDA: 7.28 PH UNITS (ref 7.35–7.45)
PH UR STRIP.AUTO: 6.5 [PH] (ref 5–8)
PO2 BLDA: 116.4 MM HG (ref 80–100)
PROT UR QL STRIP: ABNORMAL
RBC # UR: ABNORMAL /HPF
REF LAB TEST METHOD: ABNORMAL
SAO2 % BLDCOA: 97.8 % (ref 90–100)
SP GR UR STRIP: 1.02 (ref 1–1.03)
SQUAMOUS #/AREA URNS HPF: ABNORMAL /HPF
TROPONIN I SERPL-MCNC: 1.85 NG/ML
UROBILINOGEN UR QL STRIP: ABNORMAL
WBC UR QL AUTO: ABNORMAL /HPF
YEAST URNS QL MICRO: ABNORMAL /HPF

## 2018-08-19 PROCEDURE — 87186 SC STD MICRODIL/AGAR DIL: CPT | Performed by: FAMILY MEDICINE

## 2018-08-19 PROCEDURE — 25010000002 MAGNESIUM SULFATE IN D5W 1G/100ML (PREMIX) 1-5 GM/100ML-% SOLUTION: Performed by: FAMILY MEDICINE

## 2018-08-19 PROCEDURE — 36600 WITHDRAWAL OF ARTERIAL BLOOD: CPT | Performed by: FAMILY MEDICINE

## 2018-08-19 PROCEDURE — 80307 DRUG TEST PRSMV CHEM ANLYZR: CPT | Performed by: FAMILY MEDICINE

## 2018-08-19 PROCEDURE — 70450 CT HEAD/BRAIN W/O DYE: CPT | Performed by: RADIOLOGY

## 2018-08-19 PROCEDURE — 82962 GLUCOSE BLOOD TEST: CPT

## 2018-08-19 PROCEDURE — 82553 CREATINE MB FRACTION: CPT | Performed by: FAMILY MEDICINE

## 2018-08-19 PROCEDURE — 93005 ELECTROCARDIOGRAM TRACING: CPT | Performed by: FAMILY MEDICINE

## 2018-08-19 PROCEDURE — 96368 THER/DIAG CONCURRENT INF: CPT

## 2018-08-19 PROCEDURE — 25010000002 FONDAPARINUX PER 0.5 MG: Performed by: FAMILY MEDICINE

## 2018-08-19 PROCEDURE — 83050 HGB METHEMOGLOBIN QUAN: CPT | Performed by: FAMILY MEDICINE

## 2018-08-19 PROCEDURE — 87086 URINE CULTURE/COLONY COUNT: CPT | Performed by: FAMILY MEDICINE

## 2018-08-19 PROCEDURE — 70450 CT HEAD/BRAIN W/O DYE: CPT

## 2018-08-19 PROCEDURE — 81001 URINALYSIS AUTO W/SCOPE: CPT | Performed by: FAMILY MEDICINE

## 2018-08-19 PROCEDURE — 82375 ASSAY CARBOXYHB QUANT: CPT | Performed by: FAMILY MEDICINE

## 2018-08-19 PROCEDURE — 96372 THER/PROPH/DIAG INJ SC/IM: CPT

## 2018-08-19 PROCEDURE — 82805 BLOOD GASES W/O2 SATURATION: CPT | Performed by: FAMILY MEDICINE

## 2018-08-19 PROCEDURE — 87077 CULTURE AEROBIC IDENTIFY: CPT | Performed by: FAMILY MEDICINE

## 2018-08-19 PROCEDURE — 84484 ASSAY OF TROPONIN QUANT: CPT | Performed by: FAMILY MEDICINE

## 2018-08-19 RX ORDER — MAGNESIUM SULFATE 1 G/100ML
1 INJECTION INTRAVENOUS ONCE
Status: COMPLETED | OUTPATIENT
Start: 2018-08-19 | End: 2018-08-19

## 2018-08-19 RX ORDER — FONDAPARINUX SODIUM 2.5 MG/.5ML
2.5 INJECTION SUBCUTANEOUS ONCE
Status: COMPLETED | OUTPATIENT
Start: 2018-08-19 | End: 2018-08-19

## 2018-08-19 RX ADMIN — MAGNESIUM SULFATE IN DEXTROSE 1 G: 10 INJECTION, SOLUTION INTRAVENOUS at 00:34

## 2018-08-19 RX ADMIN — AZTREONAM 2 G: 2 INJECTION, POWDER, FOR SOLUTION INTRAMUSCULAR; INTRAVENOUS at 02:42

## 2018-08-19 RX ADMIN — FONDAPARINUX SODIUM 2.5 MG: 2.5 INJECTION, SOLUTION SUBCUTANEOUS at 01:08

## 2018-08-19 NOTE — ED NOTES
Called [facility] spoke with Jodie for transfer. She will call us back with Dr. Saavedra.      Symes, Heather  08/18/18 2370

## 2018-08-19 NOTE — ED NOTES
Called [facility], spoke with Eliza. Requested a ALS truck to  advised patient is a [facility] resident. They accepted transfer. ETA is 30-35 minutes. MIREILLE Chanel RN is aware.      Symes, Heather  08/19/18 0131

## 2018-08-19 NOTE — ED NOTES
Walked in patient room, patient noted to be diaphoretic, and altered mental status, patient is noted to be diabetic, performed glucose test and glucose is 95mg/dl, Dr. Wolf made aware, Dr. Wolf at bedside, Dr. Wolf wants head ct stroke protocol at this time, radiology made aware and on their way now.      Efrain Chanel RN  08/19/18 0359
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
## 2018-08-19 NOTE — ED NOTES
Ems leaving with patient at this time, patient is sleeping at this time, IV are patent and infusing at this time, lung sounds are clear and non labored.     Efrain Chanel RN  08/19/18 7363

## 2018-08-19 NOTE — ED NOTES
Called [facility]'s, spoke with Fransisca for transfer. She is getting Dr. Tran on the line for Dr. Wolf.      Symes, Heather  08/18/18 9071

## 2018-08-19 NOTE — ED NOTES
Per Dr. Wolf, I called [employer] spoke with [facility] for a weather check. They declined due to weather. Dr. Wolf is aware.      Symes, Heather  08/19/18 0053

## 2018-08-19 NOTE — ED NOTES
Sukhwinder called with , patient is going to 9th floor tower 2 ICU, bed 231. Bed is ready and clean. Report number is [phone]. She request a new set of vitals. Advised her the new vitals of /88, O2 96% of 2L of oxygen NC, , resp 16, temp is 97.6. She states ok, please call with report when available.      Symes, Heather  08/19/18 0046

## 2018-08-19 NOTE — ED NOTES
[facility] ems arrived for transport to  ICU     Efrain Chanel RN  08/19/18 7132

## 2018-08-19 NOTE — ED NOTES
[facility] called back with Dr. Saavedra. Dr. Wolf is on the line with them.      Symes, Heather  08/18/18 0103

## 2018-08-22 LAB
BACTERIA SPEC AEROBE CULT: ABNORMAL
BACTERIA SPEC AEROBE CULT: ABNORMAL

## 2018-08-23 LAB
BACTERIA SPEC AEROBE CULT: NORMAL
BACTERIA SPEC AEROBE CULT: NORMAL

## 2018-08-30 ENCOUNTER — HOSPITAL ENCOUNTER (EMERGENCY)
Facility: HOSPITAL | Age: 43
End: 2018-08-30

## 2018-09-10 ENCOUNTER — APPOINTMENT (OUTPATIENT)
Dept: CT IMAGING | Facility: HOSPITAL | Age: 43
End: 2018-09-10

## 2018-09-10 ENCOUNTER — HOSPITAL ENCOUNTER (INPATIENT)
Facility: HOSPITAL | Age: 43
LOS: 4 days | Discharge: HOME HEALTH | End: 2018-09-14
Attending: EMERGENCY MEDICINE | Admitting: HOSPITALIST

## 2018-09-10 ENCOUNTER — APPOINTMENT (OUTPATIENT)
Dept: GENERAL RADIOLOGY | Facility: HOSPITAL | Age: 43
End: 2018-09-10

## 2018-09-10 DIAGNOSIS — L89.154 DECUBITUS ULCER OF SACRAL REGION, STAGE 4 (HCC): ICD-10-CM

## 2018-09-10 DIAGNOSIS — I26.99 OTHER ACUTE PULMONARY EMBOLISM WITHOUT ACUTE COR PULMONALE (HCC): Primary | ICD-10-CM

## 2018-09-10 LAB
ALBUMIN SERPL-MCNC: 4.5 G/DL (ref 3.5–5)
ALBUMIN/GLOB SERPL: 1 G/DL (ref 1.5–2.5)
ALP SERPL-CCNC: 79 U/L (ref 40–129)
ALT SERPL W P-5'-P-CCNC: 32 U/L (ref 10–44)
ANION GAP SERPL CALCULATED.3IONS-SCNC: 9.9 MMOL/L (ref 3.6–11.2)
APTT PPP: 29.8 SECONDS (ref 23.8–36.1)
AST SERPL-CCNC: 30 U/L (ref 10–34)
BASOPHILS # BLD AUTO: 0.03 10*3/MM3 (ref 0–0.3)
BASOPHILS NFR BLD AUTO: 0.2 % (ref 0–2)
BILIRUB SERPL-MCNC: 0.4 MG/DL (ref 0.2–1.8)
BNP SERPL-MCNC: 1062 PG/ML (ref 0–100)
BUN BLD-MCNC: 15 MG/DL (ref 7–21)
BUN/CREAT SERPL: 13.3 (ref 7–25)
CALCIUM SPEC-SCNC: 9.2 MG/DL (ref 7.7–10)
CHLORIDE SERPL-SCNC: 110 MMOL/L (ref 99–112)
CK MB SERPL-CCNC: 2.85 NG/ML (ref 0–5)
CK MB SERPL-RTO: 1 % (ref 0–3)
CK SERPL-CCNC: 286 U/L (ref 24–204)
CO2 SERPL-SCNC: 24.1 MMOL/L (ref 24.3–31.9)
CREAT BLD-MCNC: 1.13 MG/DL (ref 0.43–1.29)
D DIMER PPP FEU-MCNC: 2.41 MCGFEU/ML (ref 0–0.5)
DEPRECATED RDW RBC AUTO: 45.2 FL (ref 37–54)
EOSINOPHIL # BLD AUTO: 0.2 10*3/MM3 (ref 0–0.7)
EOSINOPHIL NFR BLD AUTO: 1.6 % (ref 0–5)
ERYTHROCYTE [DISTWIDTH] IN BLOOD BY AUTOMATED COUNT: 14.3 % (ref 11.5–14.5)
GFR SERPL CREATININE-BSD FRML MDRD: 72 ML/MIN/1.73
GLOBULIN UR ELPH-MCNC: 4.3 GM/DL
GLUCOSE BLD-MCNC: 151 MG/DL (ref 70–110)
GLUCOSE BLDC GLUCOMTR-MCNC: 78 MG/DL (ref 70–130)
HBA1C MFR BLD: 9 % (ref 4.5–5.7)
HCT VFR BLD AUTO: 42.5 % (ref 42–52)
HGB BLD-MCNC: 12.7 G/DL (ref 14–18)
IMM GRANULOCYTES # BLD: 0.03 10*3/MM3 (ref 0–0.03)
IMM GRANULOCYTES NFR BLD: 0.2 % (ref 0–0.5)
INR PPP: 1.12 (ref 0.9–1.1)
LIPASE SERPL-CCNC: 47 U/L (ref 13–60)
LYMPHOCYTES # BLD AUTO: 2.03 10*3/MM3 (ref 1–3)
LYMPHOCYTES NFR BLD AUTO: 16.5 % (ref 21–51)
MAGNESIUM SERPL-MCNC: 1.6 MG/DL (ref 1.7–2.6)
MCH RBC QN AUTO: 26.1 PG (ref 27–33)
MCHC RBC AUTO-ENTMCNC: 29.9 G/DL (ref 33–37)
MCV RBC AUTO: 87.4 FL (ref 80–94)
MONOCYTES # BLD AUTO: 0.43 10*3/MM3 (ref 0.1–0.9)
MONOCYTES NFR BLD AUTO: 3.5 % (ref 0–10)
NEUTROPHILS # BLD AUTO: 9.62 10*3/MM3 (ref 1.4–6.5)
NEUTROPHILS NFR BLD AUTO: 78 % (ref 30–70)
OSMOLALITY SERPL CALC.SUM OF ELEC: 290.6 MOSM/KG (ref 273–305)
PLATELET # BLD AUTO: 353 10*3/MM3 (ref 130–400)
PMV BLD AUTO: 10.4 FL (ref 6–10)
POTASSIUM BLD-SCNC: 3.3 MMOL/L (ref 3.5–5.3)
PROT SERPL-MCNC: 8.8 G/DL (ref 6–8)
PROTHROMBIN TIME: 14.6 SECONDS (ref 11–15.4)
RBC # BLD AUTO: 4.86 10*6/MM3 (ref 4.7–6.1)
SODIUM BLD-SCNC: 144 MMOL/L (ref 135–153)
TROPONIN I SERPL-MCNC: 0.11 NG/ML
TROPONIN I SERPL-MCNC: 0.14 NG/ML
VANCOMYCIN TROUGH SERPL-MCNC: 26.3 MCG/ML (ref 5–15)
WBC NRBC COR # BLD: 12.34 10*3/MM3 (ref 4.5–12.5)

## 2018-09-10 PROCEDURE — 85610 PROTHROMBIN TIME: CPT | Performed by: EMERGENCY MEDICINE

## 2018-09-10 PROCEDURE — 83690 ASSAY OF LIPASE: CPT | Performed by: EMERGENCY MEDICINE

## 2018-09-10 PROCEDURE — 93005 ELECTROCARDIOGRAM TRACING: CPT | Performed by: EMERGENCY MEDICINE

## 2018-09-10 PROCEDURE — 71275 CT ANGIOGRAPHY CHEST: CPT

## 2018-09-10 PROCEDURE — 93010 ELECTROCARDIOGRAM REPORT: CPT | Performed by: INTERNAL MEDICINE

## 2018-09-10 PROCEDURE — 71045 X-RAY EXAM CHEST 1 VIEW: CPT

## 2018-09-10 PROCEDURE — 74176 CT ABD & PELVIS W/O CONTRAST: CPT

## 2018-09-10 PROCEDURE — 85379 FIBRIN DEGRADATION QUANT: CPT | Performed by: EMERGENCY MEDICINE

## 2018-09-10 PROCEDURE — 87040 BLOOD CULTURE FOR BACTERIA: CPT | Performed by: NURSE PRACTITIONER

## 2018-09-10 PROCEDURE — 84484 ASSAY OF TROPONIN QUANT: CPT | Performed by: EMERGENCY MEDICINE

## 2018-09-10 PROCEDURE — 80202 ASSAY OF VANCOMYCIN: CPT

## 2018-09-10 PROCEDURE — 82553 CREATINE MB FRACTION: CPT | Performed by: NURSE PRACTITIONER

## 2018-09-10 PROCEDURE — 85730 THROMBOPLASTIN TIME PARTIAL: CPT | Performed by: EMERGENCY MEDICINE

## 2018-09-10 PROCEDURE — 71275 CT ANGIOGRAPHY CHEST: CPT | Performed by: RADIOLOGY

## 2018-09-10 PROCEDURE — 80053 COMPREHEN METABOLIC PANEL: CPT | Performed by: EMERGENCY MEDICINE

## 2018-09-10 PROCEDURE — 74176 CT ABD & PELVIS W/O CONTRAST: CPT | Performed by: RADIOLOGY

## 2018-09-10 PROCEDURE — 71045 X-RAY EXAM CHEST 1 VIEW: CPT | Performed by: RADIOLOGY

## 2018-09-10 PROCEDURE — 83880 ASSAY OF NATRIURETIC PEPTIDE: CPT | Performed by: EMERGENCY MEDICINE

## 2018-09-10 PROCEDURE — 82962 GLUCOSE BLOOD TEST: CPT

## 2018-09-10 PROCEDURE — 99285 EMERGENCY DEPT VISIT HI MDM: CPT

## 2018-09-10 PROCEDURE — 0 IOPAMIDOL PER 1 ML: Performed by: EMERGENCY MEDICINE

## 2018-09-10 PROCEDURE — 25010000002 ENOXAPARIN PER 10 MG: Performed by: EMERGENCY MEDICINE

## 2018-09-10 PROCEDURE — 85025 COMPLETE CBC W/AUTO DIFF WBC: CPT | Performed by: EMERGENCY MEDICINE

## 2018-09-10 PROCEDURE — 84484 ASSAY OF TROPONIN QUANT: CPT | Performed by: NURSE PRACTITIONER

## 2018-09-10 PROCEDURE — 82550 ASSAY OF CK (CPK): CPT | Performed by: NURSE PRACTITIONER

## 2018-09-10 PROCEDURE — 83735 ASSAY OF MAGNESIUM: CPT | Performed by: HOSPITALIST

## 2018-09-10 PROCEDURE — 83036 HEMOGLOBIN GLYCOSYLATED A1C: CPT | Performed by: NURSE PRACTITIONER

## 2018-09-10 PROCEDURE — 99223 1ST HOSP IP/OBS HIGH 75: CPT | Performed by: HOSPITALIST

## 2018-09-10 RX ORDER — POTASSIUM CHLORIDE 750 MG/1
40 CAPSULE, EXTENDED RELEASE ORAL AS NEEDED
Status: DISCONTINUED | OUTPATIENT
Start: 2018-09-10 | End: 2018-09-14 | Stop reason: HOSPADM

## 2018-09-10 RX ORDER — INSULIN GLARGINE 100 [IU]/ML
20 INJECTION, SOLUTION SUBCUTANEOUS NIGHTLY
Status: ON HOLD | COMMUNITY
End: 2019-02-26 | Stop reason: SDUPTHER

## 2018-09-10 RX ORDER — NICOTINE POLACRILEX 4 MG
15 LOZENGE BUCCAL
Status: DISCONTINUED | OUTPATIENT
Start: 2018-09-10 | End: 2018-09-14 | Stop reason: HOSPADM

## 2018-09-10 RX ORDER — SODIUM CHLORIDE 0.9 % (FLUSH) 0.9 %
1-10 SYRINGE (ML) INJECTION AS NEEDED
Status: DISCONTINUED | OUTPATIENT
Start: 2018-09-10 | End: 2018-09-14 | Stop reason: HOSPADM

## 2018-09-10 RX ORDER — MAGNESIUM SULFATE HEPTAHYDRATE 40 MG/ML
4 INJECTION, SOLUTION INTRAVENOUS AS NEEDED
Status: DISCONTINUED | OUTPATIENT
Start: 2018-09-10 | End: 2018-09-14 | Stop reason: HOSPADM

## 2018-09-10 RX ORDER — POTASSIUM CHLORIDE 1.5 G/1.77G
40 POWDER, FOR SOLUTION ORAL AS NEEDED
Status: DISCONTINUED | OUTPATIENT
Start: 2018-09-10 | End: 2018-09-14 | Stop reason: HOSPADM

## 2018-09-10 RX ORDER — FAMOTIDINE 20 MG/1
20 TABLET, FILM COATED ORAL 2 TIMES DAILY PRN
Status: CANCELLED | OUTPATIENT
Start: 2018-09-10

## 2018-09-10 RX ORDER — MAGNESIUM SULFATE 1 G/100ML
1 INJECTION INTRAVENOUS AS NEEDED
Status: DISCONTINUED | OUTPATIENT
Start: 2018-09-10 | End: 2018-09-14 | Stop reason: HOSPADM

## 2018-09-10 RX ORDER — POTASSIUM CHLORIDE 7.45 MG/ML
10 INJECTION INTRAVENOUS
Status: DISCONTINUED | OUTPATIENT
Start: 2018-09-10 | End: 2018-09-14 | Stop reason: HOSPADM

## 2018-09-10 RX ORDER — DEXTROSE MONOHYDRATE 25 G/50ML
25 INJECTION, SOLUTION INTRAVENOUS
Status: DISCONTINUED | OUTPATIENT
Start: 2018-09-10 | End: 2018-09-14 | Stop reason: HOSPADM

## 2018-09-10 RX ORDER — POTASSIUM CHLORIDE 20 MEQ/1
40 TABLET, EXTENDED RELEASE ORAL DAILY
Status: DISCONTINUED | OUTPATIENT
Start: 2018-09-10 | End: 2018-09-14 | Stop reason: HOSPADM

## 2018-09-10 RX ORDER — METHENAMINE HIPPURATE 1000 MG/1
1 TABLET ORAL 2 TIMES DAILY
Status: CANCELLED | OUTPATIENT
Start: 2018-09-10

## 2018-09-10 RX ORDER — SODIUM CHLORIDE 0.9 % (FLUSH) 0.9 %
10 SYRINGE (ML) INJECTION AS NEEDED
Status: DISCONTINUED | OUTPATIENT
Start: 2018-09-10 | End: 2018-09-14 | Stop reason: HOSPADM

## 2018-09-10 RX ORDER — MAGNESIUM SULFATE HEPTAHYDRATE 40 MG/ML
2 INJECTION, SOLUTION INTRAVENOUS AS NEEDED
Status: DISCONTINUED | OUTPATIENT
Start: 2018-09-10 | End: 2018-09-14 | Stop reason: HOSPADM

## 2018-09-10 RX ORDER — MODAFINIL 200 MG/1
200 TABLET ORAL DAILY
COMMUNITY

## 2018-09-10 RX ADMIN — IOPAMIDOL 100 ML: 755 INJECTION, SOLUTION INTRAVENOUS at 18:17

## 2018-09-10 RX ADMIN — SODIUM CHLORIDE 1000 ML: 9 INJECTION, SOLUTION INTRAVENOUS at 19:37

## 2018-09-10 RX ADMIN — ENOXAPARIN SODIUM 140 MG: 80 INJECTION SUBCUTANEOUS at 19:29

## 2018-09-10 RX ADMIN — POTASSIUM CHLORIDE 40 MEQ: 1500 TABLET, EXTENDED RELEASE ORAL at 17:56
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
## 2018-09-10 NOTE — ED NOTES
Consent for IV contrast signed by patient. Patient denies any needs, no concerns voiced regarding contrast. Resting comfortably on stretcher. Will continue to monitor.        Keya Cisneros RN  09/10/18 0373

## 2018-09-10 NOTE — ED NOTES
EKG done by [employer] at 1619 and shown to Dr. Rodriguez at 1620.     Katrina Stanley  09/10/18 4559

## 2018-09-11 ENCOUNTER — APPOINTMENT (OUTPATIENT)
Dept: CARDIOLOGY | Facility: HOSPITAL | Age: 43
End: 2018-09-11
Attending: HOSPITALIST

## 2018-09-11 ENCOUNTER — APPOINTMENT (OUTPATIENT)
Dept: ULTRASOUND IMAGING | Facility: HOSPITAL | Age: 43
End: 2018-09-11
Attending: HOSPITALIST

## 2018-09-11 LAB
A-A DO2: 77.7 MMHG (ref 0–300)
ALBUMIN SERPL-MCNC: 3.5 G/DL (ref 3.5–5)
ALBUMIN/GLOB SERPL: 1 G/DL (ref 1.5–2.5)
ALP SERPL-CCNC: 63 U/L (ref 40–129)
ALT SERPL W P-5'-P-CCNC: 26 U/L (ref 10–44)
ANION GAP SERPL CALCULATED.3IONS-SCNC: 8.3 MMOL/L (ref 3.6–11.2)
ARTERIAL PATENCY WRIST A: ABNORMAL
AST SERPL-CCNC: 20 U/L (ref 10–34)
ATMOSPHERIC PRESS: 729 MMHG
BACTERIA UR QL AUTO: ABNORMAL /HPF
BASE EXCESS BLDA CALC-SCNC: -2.4 MMOL/L
BASOPHILS # BLD AUTO: 0.03 10*3/MM3 (ref 0–0.3)
BASOPHILS NFR BLD AUTO: 0.3 % (ref 0–2)
BDY SITE: ABNORMAL
BH CV ECHO MEAS - % IVS THICK: 15.7 %
BH CV ECHO MEAS - % LVPW THICK: 21.3 %
BH CV ECHO MEAS - ACS: 1.9 CM
BH CV ECHO MEAS - AO MAX PG: 6.1 MMHG
BH CV ECHO MEAS - AO MEAN PG: 2.4 MMHG
BH CV ECHO MEAS - AO ROOT AREA (BSA CORRECTED): 1.3
BH CV ECHO MEAS - AO ROOT AREA: 7.4 CM^2
BH CV ECHO MEAS - AO ROOT DIAM: 3.1 CM
BH CV ECHO MEAS - AO V2 MAX: 123.1 CM/SEC
BH CV ECHO MEAS - AO V2 MEAN: 71 CM/SEC
BH CV ECHO MEAS - AO V2 VTI: 21.5 CM
BH CV ECHO MEAS - BSA(HAYCOCK): 2.6 M^2
BH CV ECHO MEAS - BSA: 2.4 M^2
BH CV ECHO MEAS - BZI_BMI: 38.9 KILOGRAMS/M^2
BH CV ECHO MEAS - BZI_METRIC_HEIGHT: 180.3 CM
BH CV ECHO MEAS - BZI_METRIC_WEIGHT: 126.6 KG
BH CV ECHO MEAS - EDV(CUBED): 106.5 ML
BH CV ECHO MEAS - EDV(MOD-SP4): 95 ML
BH CV ECHO MEAS - EDV(TEICH): 104.4 ML
BH CV ECHO MEAS - EF(CUBED): 45.4 %
BH CV ECHO MEAS - EF(MOD-SP4): 44.2 %
BH CV ECHO MEAS - EF(TEICH): 37.9 %
BH CV ECHO MEAS - ESV(CUBED): 58.1 ML
BH CV ECHO MEAS - ESV(MOD-SP4): 53 ML
BH CV ECHO MEAS - ESV(TEICH): 64.8 ML
BH CV ECHO MEAS - FS: 18.3 %
BH CV ECHO MEAS - IVS/LVPW: 1
BH CV ECHO MEAS - IVSD: 1.4 CM
BH CV ECHO MEAS - IVSS: 1.6 CM
BH CV ECHO MEAS - LA DIMENSION: 2.9 CM
BH CV ECHO MEAS - LA/AO: 0.93
BH CV ECHO MEAS - LV DIASTOLIC VOL/BSA (35-75): 39.1 ML/M^2
BH CV ECHO MEAS - LV MASS(C)D: 254.5 GRAMS
BH CV ECHO MEAS - LV MASS(C)DI: 104.8 GRAMS/M^2
BH CV ECHO MEAS - LV MASS(C)S: 246.4 GRAMS
BH CV ECHO MEAS - LV MASS(C)SI: 101.4 GRAMS/M^2
BH CV ECHO MEAS - LV SYSTOLIC VOL/BSA (12-30): 21.8 ML/M^2
BH CV ECHO MEAS - LVIDD: 4.7 CM
BH CV ECHO MEAS - LVIDS: 3.9 CM
BH CV ECHO MEAS - LVLD AP4: 7.2 CM
BH CV ECHO MEAS - LVLS AP4: 6.2 CM
BH CV ECHO MEAS - LVOT AREA (M): 4.5 CM^2
BH CV ECHO MEAS - LVOT AREA: 4.5 CM^2
BH CV ECHO MEAS - LVOT DIAM: 2.4 CM
BH CV ECHO MEAS - LVPWD: 1.3 CM
BH CV ECHO MEAS - LVPWS: 1.6 CM
BH CV ECHO MEAS - MV A MAX VEL: 75 CM/SEC
BH CV ECHO MEAS - MV E MAX VEL: 52.8 CM/SEC
BH CV ECHO MEAS - MV E/A: 0.7
BH CV ECHO MEAS - PA ACC SLOPE: 1367 CM/SEC^2
BH CV ECHO MEAS - PA ACC TIME: 0.08 SEC
BH CV ECHO MEAS - PA PR(ACCEL): 44.1 MMHG
BH CV ECHO MEAS - RAP SYSTOLE: 10 MMHG
BH CV ECHO MEAS - RVDD: 1.8 CM
BH CV ECHO MEAS - RVSP: 58.3 MMHG
BH CV ECHO MEAS - SI(AO): 65.2 ML/M^2
BH CV ECHO MEAS - SI(CUBED): 19.9 ML/M^2
BH CV ECHO MEAS - SI(MOD-SP4): 17.3 ML/M^2
BH CV ECHO MEAS - SI(TEICH): 16.3 ML/M^2
BH CV ECHO MEAS - SV(AO): 158.5 ML
BH CV ECHO MEAS - SV(CUBED): 48.3 ML
BH CV ECHO MEAS - SV(MOD-SP4): 42 ML
BH CV ECHO MEAS - SV(TEICH): 39.5 ML
BH CV ECHO MEAS - TR MAX VEL: 347.5 CM/SEC
BILIRUB SERPL-MCNC: 0.3 MG/DL (ref 0.2–1.8)
BILIRUB UR QL STRIP: NEGATIVE
BODY TEMPERATURE: 98.6 C
BUN BLD-MCNC: 15 MG/DL (ref 7–21)
BUN/CREAT SERPL: 15 (ref 7–25)
CALCIUM SPEC-SCNC: 8.4 MG/DL (ref 7.7–10)
CHLORIDE SERPL-SCNC: 108 MMOL/L (ref 99–112)
CK MB SERPL-CCNC: 1.53 NG/ML (ref 0–5)
CK MB SERPL-CCNC: 2.26 NG/ML (ref 0–5)
CK MB SERPL-CCNC: 2.57 NG/ML (ref 0–5)
CK MB SERPL-RTO: 1.1 % (ref 0–3)
CK MB SERPL-RTO: 1.4 % (ref 0–3)
CK MB SERPL-RTO: 1.6 % (ref 0–3)
CK SERPL-CCNC: 162 U/L (ref 24–204)
CK SERPL-CCNC: 225 U/L (ref 24–204)
CK SERPL-CCNC: 95 U/L (ref 24–204)
CLARITY UR: ABNORMAL
CO2 SERPL-SCNC: 23.7 MMOL/L (ref 24.3–31.9)
COHGB MFR BLD: 0.7 % (ref 0–5)
COLOR UR: YELLOW
CREAT BLD-MCNC: 1 MG/DL (ref 0.43–1.29)
DEPRECATED RDW RBC AUTO: 45 FL (ref 37–54)
EOSINOPHIL # BLD AUTO: 0.26 10*3/MM3 (ref 0–0.7)
EOSINOPHIL NFR BLD AUTO: 2.5 % (ref 0–5)
ERYTHROCYTE [DISTWIDTH] IN BLOOD BY AUTOMATED COUNT: 14 % (ref 11.5–14.5)
GFR SERPL CREATININE-BSD FRML MDRD: 83 ML/MIN/1.73
GLOBULIN UR ELPH-MCNC: 3.6 GM/DL
GLUCOSE BLD-MCNC: 143 MG/DL (ref 70–110)
GLUCOSE BLDC GLUCOMTR-MCNC: 103 MG/DL (ref 70–130)
GLUCOSE BLDC GLUCOMTR-MCNC: 112 MG/DL (ref 70–130)
GLUCOSE BLDC GLUCOMTR-MCNC: 129 MG/DL (ref 70–130)
GLUCOSE BLDC GLUCOMTR-MCNC: 181 MG/DL (ref 70–130)
GLUCOSE BLDC GLUCOMTR-MCNC: 98 MG/DL (ref 70–130)
GLUCOSE UR STRIP-MCNC: NEGATIVE MG/DL
HCO3 BLDA-SCNC: 20.8 MMOL/L (ref 22–26)
HCT VFR BLD AUTO: 34.8 % (ref 42–52)
HCT VFR BLD CALC: 34 % (ref 42–52)
HGB BLD-MCNC: 10.2 G/DL (ref 14–18)
HGB BLDA-MCNC: 11.4 G/DL (ref 12–16)
HGB UR QL STRIP.AUTO: ABNORMAL
HOROWITZ INDEX BLD+IHG-RTO: 28 %
HYALINE CASTS UR QL AUTO: ABNORMAL /LPF
IMM GRANULOCYTES # BLD: 0.01 10*3/MM3 (ref 0–0.03)
IMM GRANULOCYTES NFR BLD: 0.1 % (ref 0–0.5)
KETONES UR QL STRIP: NEGATIVE
LEUKOCYTE ESTERASE UR QL STRIP.AUTO: NEGATIVE
LV EF 2D ECHO EST: 45 %
LYMPHOCYTES # BLD AUTO: 2.79 10*3/MM3 (ref 1–3)
LYMPHOCYTES NFR BLD AUTO: 26.8 % (ref 21–51)
MAXIMAL PREDICTED HEART RATE: 180 BPM
MCH RBC QN AUTO: 26 PG (ref 27–33)
MCHC RBC AUTO-ENTMCNC: 29.3 G/DL (ref 33–37)
MCV RBC AUTO: 88.5 FL (ref 80–94)
METHGB BLD QL: 0.1 % (ref 0–3)
MODALITY: ABNORMAL
MONOCYTES # BLD AUTO: 0.45 10*3/MM3 (ref 0.1–0.9)
MONOCYTES NFR BLD AUTO: 4.3 % (ref 0–10)
MYOGLOBIN SERPL-MCNC: 56 NG/ML (ref 0–109)
MYOGLOBIN SERPL-MCNC: 68 NG/ML (ref 0–109)
MYOGLOBIN SERPL-MCNC: 77 NG/ML (ref 0–109)
MYOGLOBIN SERPL-MCNC: 97 NG/ML (ref 0–109)
NEUTROPHILS # BLD AUTO: 6.86 10*3/MM3 (ref 1.4–6.5)
NEUTROPHILS NFR BLD AUTO: 66 % (ref 30–70)
NITRITE UR QL STRIP: POSITIVE
OSMOLALITY SERPL CALC.SUM OF ELEC: 282.7 MOSM/KG (ref 273–305)
OXYHGB MFR BLDV: 93.7 % (ref 85–100)
PCO2 BLDA: 30.8 MM HG (ref 35–45)
PH BLDA: 7.45 PH UNITS (ref 7.35–7.45)
PH UR STRIP.AUTO: 6 [PH] (ref 5–8)
PLATELET # BLD AUTO: 295 10*3/MM3 (ref 130–400)
PMV BLD AUTO: 10.5 FL (ref 6–10)
PO2 BLDA: 76.9 MM HG (ref 80–100)
POTASSIUM BLD-SCNC: 3.1 MMOL/L (ref 3.5–5.3)
PROT SERPL-MCNC: 7.1 G/DL (ref 6–8)
PROT UR QL STRIP: ABNORMAL
RBC # BLD AUTO: 3.93 10*6/MM3 (ref 4.7–6.1)
RBC # UR: ABNORMAL /HPF
REF LAB TEST METHOD: ABNORMAL
SAO2 % BLDCOA: 94.5 % (ref 90–100)
SODIUM BLD-SCNC: 140 MMOL/L (ref 135–153)
SP GR UR STRIP: >1.03 (ref 1–1.03)
SQUAMOUS #/AREA URNS HPF: ABNORMAL /HPF
STRESS TARGET HR: 153 BPM
TROPONIN I SERPL-MCNC: 0.06 NG/ML
TROPONIN I SERPL-MCNC: 0.09 NG/ML
TROPONIN I SERPL-MCNC: 0.13 NG/ML
UROBILINOGEN UR QL STRIP: ABNORMAL
VANCOMYCIN SERPL-MCNC: 28.9 MCG/ML
WBC NRBC COR # BLD: 10.4 10*3/MM3 (ref 4.5–12.5)
WBC UR QL AUTO: ABNORMAL /HPF

## 2018-09-11 PROCEDURE — 84484 ASSAY OF TROPONIN QUANT: CPT | Performed by: HOSPITALIST

## 2018-09-11 PROCEDURE — 93306 TTE W/DOPPLER COMPLETE: CPT

## 2018-09-11 PROCEDURE — 83874 ASSAY OF MYOGLOBIN: CPT | Performed by: NURSE PRACTITIONER

## 2018-09-11 PROCEDURE — 94799 UNLISTED PULMONARY SVC/PX: CPT

## 2018-09-11 PROCEDURE — 93971 EXTREMITY STUDY: CPT | Performed by: RADIOLOGY

## 2018-09-11 PROCEDURE — 93005 ELECTROCARDIOGRAM TRACING: CPT | Performed by: INTERNAL MEDICINE

## 2018-09-11 PROCEDURE — 80053 COMPREHEN METABOLIC PANEL: CPT | Performed by: HOSPITALIST

## 2018-09-11 PROCEDURE — 63710000001 INSULIN DETEMIR PER 5 UNITS: Performed by: HOSPITALIST

## 2018-09-11 PROCEDURE — 82553 CREATINE MB FRACTION: CPT | Performed by: HOSPITALIST

## 2018-09-11 PROCEDURE — 84484 ASSAY OF TROPONIN QUANT: CPT | Performed by: NURSE PRACTITIONER

## 2018-09-11 PROCEDURE — 99233 SBSQ HOSP IP/OBS HIGH 50: CPT | Performed by: HOSPITALIST

## 2018-09-11 PROCEDURE — 82375 ASSAY CARBOXYHB QUANT: CPT | Performed by: INTERNAL MEDICINE

## 2018-09-11 PROCEDURE — 80202 ASSAY OF VANCOMYCIN: CPT | Performed by: HOSPITALIST

## 2018-09-11 PROCEDURE — 99222 1ST HOSP IP/OBS MODERATE 55: CPT | Performed by: INTERNAL MEDICINE

## 2018-09-11 PROCEDURE — 85025 COMPLETE CBC W/AUTO DIFF WBC: CPT | Performed by: HOSPITALIST

## 2018-09-11 PROCEDURE — 83874 ASSAY OF MYOGLOBIN: CPT | Performed by: HOSPITALIST

## 2018-09-11 PROCEDURE — 82550 ASSAY OF CK (CPK): CPT | Performed by: HOSPITALIST

## 2018-09-11 PROCEDURE — 93971 EXTREMITY STUDY: CPT

## 2018-09-11 PROCEDURE — 82962 GLUCOSE BLOOD TEST: CPT

## 2018-09-11 PROCEDURE — 82553 CREATINE MB FRACTION: CPT | Performed by: NURSE PRACTITIONER

## 2018-09-11 PROCEDURE — 25010000002 ENOXAPARIN PER 10 MG: Performed by: HOSPITALIST

## 2018-09-11 PROCEDURE — 81001 URINALYSIS AUTO W/SCOPE: CPT | Performed by: EMERGENCY MEDICINE

## 2018-09-11 PROCEDURE — 36600 WITHDRAWAL OF ARTERIAL BLOOD: CPT | Performed by: INTERNAL MEDICINE

## 2018-09-11 PROCEDURE — 82805 BLOOD GASES W/O2 SATURATION: CPT | Performed by: INTERNAL MEDICINE

## 2018-09-11 PROCEDURE — 83050 HGB METHEMOGLOBIN QUAN: CPT | Performed by: INTERNAL MEDICINE

## 2018-09-11 PROCEDURE — 93306 TTE W/DOPPLER COMPLETE: CPT | Performed by: INTERNAL MEDICINE

## 2018-09-11 PROCEDURE — 82550 ASSAY OF CK (CPK): CPT | Performed by: NURSE PRACTITIONER

## 2018-09-11 PROCEDURE — 25010000002 VANCOMYCIN PER 500 MG: Performed by: HOSPITALIST

## 2018-09-11 PROCEDURE — 93010 ELECTROCARDIOGRAM REPORT: CPT | Performed by: INTERNAL MEDICINE

## 2018-09-11 RX ORDER — ARIPIPRAZOLE 10 MG/1
10 TABLET ORAL NIGHTLY
Status: DISCONTINUED | OUTPATIENT
Start: 2018-09-11 | End: 2018-09-14 | Stop reason: HOSPADM

## 2018-09-11 RX ORDER — DULOXETIN HYDROCHLORIDE 60 MG/1
60 CAPSULE, DELAYED RELEASE ORAL DAILY
Status: DISCONTINUED | OUTPATIENT
Start: 2018-09-11 | End: 2018-09-14 | Stop reason: HOSPADM

## 2018-09-11 RX ORDER — MODAFINIL 100 MG/1
200 TABLET ORAL DAILY
Status: DISCONTINUED | OUTPATIENT
Start: 2018-09-11 | End: 2018-09-14 | Stop reason: HOSPADM

## 2018-09-11 RX ORDER — BACLOFEN 10 MG/1
30 TABLET ORAL
Status: DISCONTINUED | OUTPATIENT
Start: 2018-09-11 | End: 2018-09-14 | Stop reason: HOSPADM

## 2018-09-11 RX ORDER — SUCRALFATE 1 G/1
1 TABLET ORAL NIGHTLY
Status: DISCONTINUED | OUTPATIENT
Start: 2018-09-11 | End: 2018-09-14 | Stop reason: HOSPADM

## 2018-09-11 RX ORDER — PANTOPRAZOLE SODIUM 40 MG/1
40 TABLET, DELAYED RELEASE ORAL DAILY
Status: DISCONTINUED | OUTPATIENT
Start: 2018-09-11 | End: 2018-09-14 | Stop reason: HOSPADM

## 2018-09-11 RX ORDER — GABAPENTIN 400 MG/1
800 CAPSULE ORAL EVERY 8 HOURS SCHEDULED
Status: DISCONTINUED | OUTPATIENT
Start: 2018-09-11 | End: 2018-09-14 | Stop reason: HOSPADM

## 2018-09-11 RX ORDER — NYSTATIN 100000 [USP'U]/G
1 POWDER TOPICAL 3 TIMES DAILY PRN
Status: DISCONTINUED | OUTPATIENT
Start: 2018-09-11 | End: 2018-09-14 | Stop reason: HOSPADM

## 2018-09-11 RX ORDER — METHENAMINE HIPPURATE 1000 MG/1
1 TABLET ORAL 2 TIMES DAILY
Status: DISCONTINUED | OUTPATIENT
Start: 2018-09-11 | End: 2018-09-14 | Stop reason: HOSPADM

## 2018-09-11 RX ORDER — ALBUTEROL SULFATE 2.5 MG/3ML
2.5 SOLUTION RESPIRATORY (INHALATION) EVERY 4 HOURS PRN
Status: DISCONTINUED | OUTPATIENT
Start: 2018-09-11 | End: 2018-09-13

## 2018-09-11 RX ORDER — ATORVASTATIN CALCIUM 10 MG/1
10 TABLET, FILM COATED ORAL NIGHTLY
Status: DISCONTINUED | OUTPATIENT
Start: 2018-09-11 | End: 2018-09-14 | Stop reason: HOSPADM

## 2018-09-11 RX ORDER — POTASSIUM CHLORIDE 750 MG/1
10 TABLET, FILM COATED, EXTENDED RELEASE ORAL DAILY
Status: DISCONTINUED | OUTPATIENT
Start: 2018-09-11 | End: 2018-09-11 | Stop reason: SDUPTHER

## 2018-09-11 RX ADMIN — PANTOPRAZOLE SODIUM 40 MG: 40 TABLET, DELAYED RELEASE ORAL at 13:29

## 2018-09-11 RX ADMIN — INSULIN DETEMIR 15 UNITS: 100 INJECTION, SOLUTION SUBCUTANEOUS at 13:31

## 2018-09-11 RX ADMIN — ENOXAPARIN SODIUM 130 MG: 80 INJECTION SUBCUTANEOUS at 20:41

## 2018-09-11 RX ADMIN — MODAFINIL 200 MG: 100 TABLET ORAL at 13:28

## 2018-09-11 RX ADMIN — VANCOMYCIN HYDROCHLORIDE 1750 MG: 5 INJECTION, POWDER, LYOPHILIZED, FOR SOLUTION INTRAVENOUS at 02:34

## 2018-09-11 RX ADMIN — ARIPIPRAZOLE 10 MG: 10 TABLET ORAL at 20:40

## 2018-09-11 RX ADMIN — ENOXAPARIN SODIUM 130 MG: 80 INJECTION SUBCUTANEOUS at 08:12

## 2018-09-11 RX ADMIN — INSULIN DETEMIR 15 UNITS: 100 INJECTION, SOLUTION SUBCUTANEOUS at 20:41

## 2018-09-11 RX ADMIN — GABAPENTIN 800 MG: 400 CAPSULE ORAL at 13:28

## 2018-09-11 RX ADMIN — BACLOFEN 30 MG: 10 TABLET ORAL at 18:34

## 2018-09-11 RX ADMIN — POTASSIUM CHLORIDE 40 MEQ: 1500 TABLET, EXTENDED RELEASE ORAL at 08:13

## 2018-09-11 RX ADMIN — BACLOFEN 30 MG: 10 TABLET ORAL at 20:40

## 2018-09-11 RX ADMIN — DULOXETINE HYDROCHLORIDE 60 MG: 60 CAPSULE, DELAYED RELEASE ORAL at 13:29

## 2018-09-11 RX ADMIN — GABAPENTIN 800 MG: 400 CAPSULE ORAL at 21:12

## 2018-09-11 RX ADMIN — BACLOFEN 30 MG: 10 TABLET ORAL at 13:27

## 2018-09-11 RX ADMIN — SUCRALFATE 1 G: 1 TABLET ORAL at 20:40

## 2018-09-11 RX ADMIN — ATORVASTATIN CALCIUM 10 MG: 10 TABLET, FILM COATED ORAL at 20:41

## 2018-09-11 NOTE — DISCHARGE PLACEMENT REQUEST
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    Vancomycin, Random [[id number]] Collected:  09/11/18 1124    Specimen:  Blood Updated:  09/11/18 1147    Myoglobin, Serum [[id number]] Collected:  09/11/18 1124    Specimen:  Blood Updated:  09/11/18 1145    Blood Gas, Arterial [[id number]]  (Abnormal) Collected:  09/11/18 0940    Specimen:  Arterial Blood Updated:  09/11/18 0955     Site Arterial: right radial     Kalpesh's Test N/A     pH, Arterial 7.448 pH units      pCO2, Arterial 30.8 (L) mm Hg      pO2, Arterial 76.9 (L) mm Hg      HCO3, Arterial 20.8 (L) mmol/L      Base Excess, Arterial -2.4 mmol/L      O2 Saturation, Arterial 94.5 %      Hemoglobin, Blood Gas 11.4 (L) g/dL      Hematocrit, Blood Gas 34.0 (L) %      Oxyhemoglobin 93.7 %      Methemoglobin 0.10 %      Carboxyhemoglobin 0.7 %      A-a Gradiant 77.7 mmHg      Temperature 98.6 C      Barometric Pressure for Blood Gas 729 mmHg      Modality Cannula     FIO2 28 %     Blood Culture - Blood, [[id number]]  (Normal) Collected:  09/10/18 2138    Specimen:  Blood from Arm, Left Updated:  09/11/18 0946     Blood Culture No growth at less than 24 hours    Blood Culture - Blood,  [[id number]]  (Normal) Collected:  09/10/18 2137    Specimen:  Blood from Hand, Left Updated:  09/11/18 0946     Blood Culture No growth at less than 24 hours    Troponin [[id number]]  (Abnormal) Collected:  09/11/18 0756    Specimen:  Blood Updated:  09/11/18 0852     Troponin I 0.092 (H) ng/mL     Narrative:       Ultra Troponin I Reference Range:         <=0.039 ng/mL: Negative    0.04-0.779 ng/mL: Indeterminate Range. Suspicious of MI.  Clinical correlation required.       >=0.78  ng/mL: Consistent with myocardial injury.  Clinical correlation required.    CK [[id number]]  (Normal) Collected:  09/11/18 0756    Specimen:  Blood Updated:  09/11/18 0851     Creatine Kinase 162 U/L     CK-MB [[id number]]  (Normal) Collected:  09/11/18 0756    Specimen:  Blood Updated:  09/11/18 0851     CKMB 2.26 ng/mL     CK-MB Index [[id number]]  (Normal) Collected:  09/11/18 0756    Specimen:  Blood Updated:  09/11/18 0851     CK-MB Index 1.4 %     Myoglobin, Serum [[id number]]  (Normal) Collected:  09/11/18 0756    Specimen:  Blood Updated:  09/11/18 0851     Myoglobin 77.0 ng/mL     POC Glucose Once [[id number]]  (Normal) Collected:  09/11/18 0808    Specimen:  Blood Updated:  09/11/18 0822     Glucose 112 mg/dL     Narrative:       Meter: [id number] : [id number] JOSEY MILLER    Urinalysis, Microscopic Only - Urine, Clean Catch [[id number]]  (Abnormal) Collected:  09/11/18 0617    Specimen:  Urine from Urine, Clean Catch Updated:  09/11/18 0641     RBC, UA 0-2 /HPF      WBC, UA 0-2 /HPF      Bacteria, UA Trace (A) /HPF      Squamous Epithelial Cells, UA 0-2 /HPF      Hyaline Casts, UA None Seen /LPF      Methodology Manual Light Microscopy    Urinalysis With Culture If Indicated - Urine, Catheter [[id number]]  (Abnormal) Collected:  09/11/18 0617    Specimen:  Urine from Urine, Clean Catch Updated:  09/11/18 0629     Color, UA Yellow     Appearance, UA Cloudy (A)     pH, UA 6.0     Specific Gravity, UA >1.030 (H)     Glucose, UA  Negative     Ketones, UA Negative     Bilirubin, UA Negative     Blood, UA Trace (A)     Protein, UA Trace (A)     Leuk Esterase, UA Negative     Nitrite, UA Positive (A)     Urobilinogen, UA 0.2 E.U./dL    Troponin [[id number]]  (Abnormal) Collected:  09/11/18 0055    Specimen:  Blood Updated:  09/11/18 0204     Troponin I 0.126 (H) ng/mL     Narrative:       Ultra Troponin I Reference Range:         <=0.039 ng/mL: Negative    0.04-0.779 ng/mL: Indeterminate Range. Suspicious of MI.  Clinical correlation required.       >=0.78  ng/mL: Consistent with myocardial injury.  Clinical correlation required.    CK [[id number]]  (Abnormal) Collected:  09/11/18 0055    Specimen:  Blood Updated:  09/11/18 0201     Creatine Kinase 225 (H) U/L     CK-MB [[id number]]  (Normal) Collected:  09/11/18 0055    Specimen:  Blood Updated:  09/11/18 0201     CKMB 2.57 ng/mL     CK-MB Index [[id number]]  (Normal) Collected:  09/11/18 0055    Specimen:  Blood Updated:  09/11/18 0201     CK-MB Index 1.1 %     Myoglobin, Serum [[id number]]  (Normal) Collected:  09/11/18 0055    Specimen:  Blood Updated:  09/11/18 0159     Myoglobin 97.0 ng/mL     Comprehensive Metabolic Panel [[id number]]  (Abnormal) Collected:  09/11/18 0055    Specimen:  Blood Updated:  09/11/18 0157     Glucose 143 (H) mg/dL      BUN 15 mg/dL      Creatinine 1.00 mg/dL      Sodium 140 mmol/L      Potassium 3.1 (L) mmol/L      Chloride 108 mmol/L      CO2 23.7 (L) mmol/L      Calcium 8.4 mg/dL      Total Protein 7.1 g/dL      Albumin 3.50 g/dL      ALT (SGPT) 26 U/L      AST (SGOT) 20 U/L      Alkaline Phosphatase 63 U/L      Comment: Note New Reference Ranges        Total Bilirubin 0.3 mg/dL      eGFR Non African Amer 83 mL/min/1.73      Globulin 3.6 gm/dL      A/G Ratio 1.0 (L) g/dL      BUN/Creatinine Ratio 15.0     Anion Gap 8.3 mmol/L     Osmolality, Calculated [[id number]]  (Normal) Collected:  09/11/18 0055    Specimen:  Blood Updated:  09/11/18 0157     Osmolality Calc  282.7 mOsm/kg     CBC Auto Differential [[id number]]  (Abnormal) Collected:  09/11/18 0055    Specimen:  Blood Updated:  09/11/18 0142     WBC 10.40 10*3/mm3      RBC 3.93 (L) 10*6/mm3      Hemoglobin 10.2 (L) g/dL      Hematocrit 34.8 (L) %      MCV 88.5 fL      MCH 26.0 (L) pg      MCHC 29.3 (L) g/dL      RDW 14.0 %      RDW-SD 45.0 fl      MPV 10.5 (H) fL      Platelets 295 10*3/mm3      Neutrophil % 66.0 %      Lymphocyte % 26.8 %      Monocyte % 4.3 %      Eosinophil % 2.5 %      Basophil % 0.3 %      Immature Grans % 0.1 %      Neutrophils, Absolute 6.86 (H) 10*3/mm3      Lymphocytes, Absolute 2.79 10*3/mm3      Monocytes, Absolute 0.45 10*3/mm3      Eosinophils, Absolute 0.26 10*3/mm3      Basophils, Absolute 0.03 10*3/mm3      Immature Grans, Absolute 0.01 10*3/mm3     Magnesium [[id number]]  (Abnormal) Collected:  09/10/18 2113    Specimen:  Blood Updated:  09/10/18 2228     Magnesium 1.6 (L) mg/dL     CK-MB Index [[id number]]  (Normal) Collected:  09/10/18 2113    Specimen:  Blood Updated:  09/10/18 2205     CK-MB Index 1.0 %     CK-MB [[id number]]  (Normal) Collected:  09/10/18 2113    Specimen:  Blood Updated:  09/10/18 2205     CKMB 2.85 ng/mL     CK [[id number]]  (Abnormal) Collected:  09/10/18 2113    Specimen:  Blood Updated:  09/10/18 2205     Creatine Kinase 286 (H) U/L     Troponin [[id number]]  (Abnormal) Collected:  09/10/18 2113    Specimen:  Blood Updated:  09/10/18 2205     Troponin I 0.108 (H) ng/mL     Narrative:       Ultra Troponin I Reference Range:         <=0.039 ng/mL: Negative    0.04-0.779 ng/mL: Indeterminate Range. Suspicious of MI.  Clinical correlation required.       >=0.78  ng/mL: Consistent with myocardial injury.  Clinical correlation required.    Vancomycin, Trough [[id number]]  (Abnormal) Collected:  09/10/18 2139    Specimen:  Blood Updated:  09/10/18 2202     Vancomycin Trough 26.30 (H) mcg/mL     POC Glucose Once [[id number]]  (Normal) Collected:  09/10/18 2112    Specimen:   Blood Updated:  09/10/18 2137     Glucose 78 mg/dL     Narrative:       Meter: [id number] : [id number] Joe Wu    Hemoglobin A1c [[id number]]  (Abnormal) Collected:  09/10/18 1702    Specimen:  Blood Updated:  09/10/18 2112     Hemoglobin A1C 9.00 (H) %     Protime-INR [[id number]]  (Abnormal) Collected:  09/10/18 1702    Specimen:  Blood Updated:  09/10/18 1922     Protime 14.6 Seconds      Comment: Note new Reference Range        INR 1.12 (H)    Narrative:       Suggested INR therapeutic range for stable oral anticoagulant therapy:    Low Intensity therapy:   1.5-2.0  Moderate Intensity therapy:   2.0-3.0  High Intensity therapy:   2.5-4.0    aPTT [[id number]]  (Normal) Collected:  09/10/18 1702    Specimen:  Blood Updated:  09/10/18 1922     PTT 29.8 seconds      Comment: Note new Reference Range       Narrative:       PTT Heparin Therapeutic Range:  59 - 95 seconds    D-dimer, Quantitative [[id number]]  (Abnormal) Collected:  09/10/18 1702    Specimen:  Blood Updated:  09/10/18 1745     D-Dimer, Quantitative 2.41 (C) MCGFEU/mL      Comment: Note new Reference Range       Narrative:       d-Dimer assay result is to be used in conjunction with a non-high clinical pretest probability (PTP) assessment tool to exclude PE and aid in diagnosis of VTE with cutoff of 0.5 MCGFEU/mL.    BNP [[id number]]  (Abnormal) Collected:  09/10/18 1702    Specimen:  Blood Updated:  09/10/18 1745     BNP 1,062.0 (H) pg/mL     Troponin [[id number]]  (Abnormal) Collected:  09/10/18 1702    Specimen:  Blood from Arm, Right Updated:  09/10/18 1742     Troponin I 0.139 (H) ng/mL     Narrative:       Ultra Troponin I Reference Range:         <=0.039 ng/mL: Negative    0.04-0.779 ng/mL: Indeterminate Range. Suspicious of MI.  Clinical correlation required.       >=0.78  ng/mL: Consistent with myocardial injury.  Clinical correlation required.    Comprehensive Metabolic Panel [[id number]]  (Abnormal) Collected:  09/10/18 1702    Specimen:   Blood from Arm, Right Updated:  09/10/18 1730     Glucose 151 (H) mg/dL      BUN 15 mg/dL      Creatinine 1.13 mg/dL      Sodium 144 mmol/L      Potassium 3.3 (L) mmol/L      Chloride 110 mmol/L      CO2 24.1 (L) mmol/L      Calcium 9.2 mg/dL      Total Protein 8.8 (H) g/dL      Albumin 4.50 g/dL      ALT (SGPT) 32 U/L      AST (SGOT) 30 U/L      Alkaline Phosphatase 79 U/L      Comment: Note New Reference Ranges        Total Bilirubin 0.4 mg/dL      eGFR Non African Amer 72 mL/min/1.73      Globulin 4.3 gm/dL      A/G Ratio 1.0 (L) g/dL      BUN/Creatinine Ratio 13.3     Anion Gap 9.9 mmol/L     Osmolality, Calculated [[id number]]  (Normal) Collected:  09/10/18 1702    Specimen:  Blood from Arm, Right Updated:  09/10/18 1730     Osmolality Calc 290.6 mOsm/kg     Lipase [[id number]]  (Normal) Collected:  09/10/18 1702    Specimen:  Blood from Arm, Right Updated:  09/10/18 1729     Lipase 47 U/L     CBC & Differential [[id number]] Collected:  09/10/18 1702    Specimen:  Blood Updated:  09/10/18 1726    Narrative:       The following orders were created for panel order CBC & Differential.  Procedure                               Abnormality         Status                     ---------                               -----------         ------                     CBC Auto Differential[[id number]]        Abnormal            Final result                 Please view results for these tests on the individual orders.    CBC Auto Differential [[id number]]  (Abnormal) Collected:  09/10/18 1702    Specimen:  Blood Updated:  09/10/18 1726     WBC 12.34 10*3/mm3      RBC 4.86 10*6/mm3      Hemoglobin 12.7 (L) g/dL      Hematocrit 42.5 %      MCV 87.4 fL      MCH 26.1 (L) pg      MCHC 29.9 (L) g/dL      RDW 14.3 %      RDW-SD 45.2 fl      MPV 10.4 (H) fL      Platelets 353 10*3/mm3      Neutrophil % 78.0 (H) %      Lymphocyte % 16.5 (L) %      Monocyte % 3.5 %      Eosinophil % 1.6 %      Basophil % 0.2 %      Immature Grans % 0.2 %       Neutrophils, Absolute 9.62 (H) 10*3/mm3      Lymphocytes, Absolute 2.03 10*3/mm3      Monocytes, Absolute 0.43 10*3/mm3      Eosinophils, Absolute 0.20 10*3/mm3      Basophils, Absolute 0.03 10*3/mm3      Immature Grans, Absolute 0.03 10*3/mm3         Imaging Results (last 24 hours)     Procedure Component Value Units Date/Time    CT Abdomen Pelvis Without Contrast [[id number]] Collected:  09/11/18 0714     Updated:  09/11/18 0942    Narrative:       CT ABDOMEN PELVIS WO CONTRAST-     HISTORY:  Epigastric pain, abd bloating          COMPARISON: 08/18/2018.     TECHNIQUE: Axial images were acquired from the lung bases through the  pubic symphysis without any IV or oral contrast.  Reformatted images were created in both the coronal and sagittal planes.     Radiation dose reduction techniques were utilized per ALARA protocol.  Automated exposure control was initiated through either or CareDoRem[employer]ly or  DoseRigTruveris software packages by  protocol.           FINDINGS:   Tiny nodule in the right lung measuring 3 mm.  Trace right effusion.     The liver is homogeneous. There is no evidence of focal hepatic mass     The spleen is homogeneous     There is no peripancreatic stranding or pancreatic head mass.     There is no adrenal enlargement.     The kidneys show no evidence of hydronephrosis or hydroureter. I do not  see any distal ureteral stones.      Bilateral decubitus ulcers. Slightly worse on the right than on the  previous exam.     Fluid in the right hip joint similar to the previous exam.     There is no bowel wall thickening or mesenteric stranding.     There is no evidence of mesenteric or retroperitoneal adenopathy               Impression:       1.  Bilateral decubitus ulcers slightly worse.  2.  Fluid in the right hip joint.  3.  Right-sided parenchymal nodule and trace right effusion.                This report was finalized on 9/11/2018 9:33 AM by Dr. Hernesto Alaniz MD.       XR Chest 1 View [[id number]]  Collected:  09/11/18 0713     Updated:  09/11/18 0930    Narrative:       XR CHEST 1 VIEW-     CLINICAL INDICATION: Shortness of breath.           COMPARISON: 08/18/2011.     TECHNIQUE: Single frontal view of the chest.     FINDINGS:     There is no focal alveolar infiltrate or effusion.  The cardiac silhouette is normal. The pulmonary vasculature is  unremarkable.  There is no evidence of an acute osseous abnormality.   There are no suspicious-appearing parenchymal soft tissue nodules.            Impression:       No evidence of active or acute cardiopulmonary disease on today's chest  radiograph.         This report was finalized on 9/11/2018 9:28 AM by Dr. Hernesto Alaniz MD.       CT Chest Pulmonary Embolism With Contrast [[id number]] Collected:  09/11/18 0713     Updated:  09/11/18 0915    Narrative:       CT CHEST PULMONARY EMBOLISM WITH CONTRAST-     CLINICAL INDICATION: SOB, tachycardia, +D-dimer.          COMPARISON: 07/29/2017.      PROCEDURE: Thin cut axial images were acquired through the pulmonary  vessels during the rapid infusion of IV contrast.     Additional 3-D reformatted images obtained via post-processing for  improved diagnostic accuracy and procedural planning.     Radiation dose reduction techniques were utilized per ALARA protocol.  Automated exposure control was initiated through either or CareDose or  DoseRight software packages by  protocol.           FINDINGS: Today's study demonstrates opacification of the central  pulmonary vessels.  Bilateral filling defects are present compatible with extensive  bilateral pulmonary emboli..     Otherwise there are no parenchymal soft tissue nodules or masses.     There is no mediastinal lymph node enlargement     No pericardial or pleural effusion.          Impression:       Extensive bilateral pulmonary emboli.     This report was finalized on 9/11/2018 9:13 AM by Dr. Hernesto Alaniz MD.           ECG/EMG Results (last 24 hours)     Procedure  Component Value Units Date/Time    ECG 12 Lead [[id number]] Collected:  09/10/18 1619     Updated:  09/10/18 1706    Narrative:       Test Reason : sob  Blood Pressure : **/** mmHG  Vent. Rate : 113 BPM     Atrial Rate : 113 BPM     P-R Int : 126 ms          QRS Dur : 116 ms      QT Int : 332 ms       P-R-T Axes : 050 177 052 degrees     QTc Int : 455 ms    Sinus tachycardia  Possible Left atrial enlargement  Right axis deviation  ST elevation anterior chest leads cannot rule out STEMI  Abnormal ECG  When compared with ECG of 19-AUG-2018 00:48,  No significant change was found  Confirmed by Pallavi Vincent ([id number]) on 9/10/2018 5:05:51 PM    Referred By:  KIRBY           Confirmed By:Pallavi Vincent             Physician Progress Notes (last 24 hours) (Notes from 9/10/2018 11:57 AM through 2018 11:57 AM)      Yovana Pack MD at 2018 10:05 AM              [facility]     Patient Identification:  Name:  Ken Krueger  Age:  42 y.o.  Sex:  male  :  1975  MRN:  [id number]  Visit Number:  [id number]  Primary Care Provider:  Provider, No Known    Length of stay:  1    Subjective:  Patient is currently resting, reports less difficulty with breathing on deep inspiration, no cough, patient chart reviewed.  Patient has bilateral extensive pulmonary embolism on CAT scan of the chest PE protocol.  Currently on Lovenox full dose anticoagulation.    Chief Complaint: Pleuritic chest pain  ----------------------------------------------------------------------------------------------------------------------  Current Hospital Meds:    enoxaparin 1 mg/kg Subcutaneous Q12H   insulin aspart 0-7 Units Subcutaneous 4x Daily AC & at Bedtime   potassium chloride 40 mEq Oral Daily       Pharmacy to dose vancomycin      ----------------------------------------------------------------------------------------------------------------------  Vital Signs:  Temp:  [97.2 °F (36.2 °C)-97.9  °F (36.6 °C)] 97.9 °F (36.6 °C)  Heart Rate:  [] 90  Resp:  [16-20] 18  BP: ()/(60-95) 122/85       Tele: Sinus rhythm rate of 87 bpm  1    09/10/18  1611 09/10/18  2000   Weight: 136 kg (300 lb) 127 kg (279 lb 1.6 oz)     Body mass index is 38.93 kg/m².    Intake/Output Summary (Last 24 hours) at 09/11/18 1005  Last data filed at 09/11/18 0234   Gross per 24 hour   Intake              500 ml   Output              550 ml   Net              -50 ml     Diet Regular; Consistent Carbohydrate  ----------------------------------------------------------------------------------------------------------------------  Physical exam:  General: Comfortable,awake, alert, oriented to self, place, and time, well-developed and well-nourished.  No respiratory distress.    Skin:  Skin is warm and dry. No rash noted. No pallor.    HENT:  Head:  Normocephalic and atraumatic.  Mouth:  Moist mucous membranes.    Eyes:  Conjunctivae and EOM are normal.  Pupils are equal, round, and reactive to light.  No scleral icterus.    Neck:  Neck supple.  No JVD present.    Pulmonary/Chest:  No respiratory distress, no wheezes, no crackles, with normal breath sounds and good air movement.  Cardiovascular:  Normal rate, regular rhythm and normal heart sounds with no murmur.  Abdominal:  Soft.  Bowel sounds are normal.  No distension and no tenderness.  Left lateral medical has colostomy bag with soft stools, right side has urostomy bag with elvin urine.  Extremities:  No edema, no tenderness, and no deformity.  No red or swollen joints anywhere.  Left above-knee amputee, right lower extremity no cyanosis, no edema.  Neurological:  Patient is paraplegic at the level just below the xiphoid process, he is able to discern pressure but not to painful or tactile stimuli                   ----------------------------------------------------------------------------------------------------------------------   ----------------------------------------------------------------------------------------------------------------------    Results from last 7 days  Lab Units 09/11/18  0756 09/11/18  0055 09/10/18  2113   CK TOTAL U/L 162 225* 286*   CKMB ng/mL 2.26 2.57 2.85   CK MB INDEX % 1.4 1.1 1.0   TROPONIN I ng/mL 0.092* 0.126* 0.108*   MYOGLOBIN ng/mL 77.0 97.0  --        Results from last 7 days  Lab Units 09/11/18  0055 09/10/18  1702   WBC 10*3/mm3 10.40 12.34   HEMOGLOBIN g/dL 10.2* 12.7*   HEMATOCRIT % 34.8* 42.5   MCV fL 88.5 87.4   MCHC g/dL 29.3* 29.9*   PLATELETS 10*3/mm3 295 353   INR   --  1.12*       Results from last 7 days  Lab Units 09/11/18  0940   PH, ARTERIAL pH units 7.448   PO2 ART mm Hg 76.9*   PCO2, ARTERIAL mm Hg 30.8*   HCO3 ART mmol/L 20.8*       Results from last 7 days  Lab Units 09/11/18  0055 09/10/18  2113 09/10/18  1702   SODIUM mmol/L 140  --  144   POTASSIUM mmol/L 3.1*  --  3.3*   MAGNESIUM mg/dL  --  1.6*  --    CHLORIDE mmol/L 108  --  110   CO2 mmol/L 23.7*  --  24.1*   BUN mg/dL 15  --  15   CREATININE mg/dL 1.00  --  1.13   EGFR IF NONAFRICN AM mL/min/1.73 83  --  72   CALCIUM mg/dL 8.4  --  9.2   GLUCOSE mg/dL 143*  --  151*   ALBUMIN g/dL 3.50  --  4.50   BILIRUBIN mg/dL 0.3  --  0.4   ALK PHOS U/L 63  --  79   AST (SGOT) U/L 20  --  30   ALT (SGPT) U/L 26  --  32   Estimated Creatinine Clearance: 133.3 mL/min (by C-G formula based on SCr of 1 mg/dL).    No results found for: AMMONIA      Blood Culture   Date Value Ref Range Status   09/10/2018 No growth at less than 24 hours  Preliminary   09/10/2018 No growth at less than 24 hours  Preliminary                I have personally looked at the labs and they are summarized above.  ----------------------------------------------------------------------------------------------------------------------  Imaging Results  (last 24 hours)     Procedure Component Value Units Date/Time    CT Abdomen Pelvis Without Contrast [[id number]] Collected:  09/11/18 0714     Updated:  09/11/18 0942    Narrative:       CT ABDOMEN PELVIS WO CONTRAST-     HISTORY:  Epigastric pain, abd bloating          COMPARISON: 08/18/2018.     TECHNIQUE: Axial images were acquired from the lung bases through the  pubic symphysis without any IV or oral contrast.  Reformatted images were created in both the coronal and sagittal planes.     Radiation dose reduction techniques were utilized per ALARA protocol.  Automated exposure control was initiated through either or [employer] or  DoseRight software packages by  protocol.           FINDINGS:   Tiny nodule in the right lung measuring 3 mm.  Trace right effusion.     The liver is homogeneous. There is no evidence of focal hepatic mass     The spleen is homogeneous     There is no peripancreatic stranding or pancreatic head mass.     There is no adrenal enlargement.     The kidneys show no evidence of hydronephrosis or hydroureter. I do not  see any distal ureteral stones.      Bilateral decubitus ulcers. Slightly worse on the right than on the  previous exam.     Fluid in the right hip joint similar to the previous exam.     There is no bowel wall thickening or mesenteric stranding.     There is no evidence of mesenteric or retroperitoneal adenopathy               Impression:       1.  Bilateral decubitus ulcers slightly worse.  2.  Fluid in the right hip joint.  3.  Right-sided parenchymal nodule and trace right effusion.                This report was finalized on 9/11/2018 9:33 AM by Dr. Hernesto Alaniz MD.       XR Chest 1 View [[id number]] Collected:  09/11/18 0713     Updated:  09/11/18 0930    Narrative:       XR CHEST 1 VIEW-     CLINICAL INDICATION: Shortness of breath.           COMPARISON: 08/18/2011.     TECHNIQUE: Single frontal view of the chest.     FINDINGS:     There is no focal alveolar  infiltrate or effusion.  The cardiac silhouette is normal. The pulmonary vasculature is  unremarkable.  There is no evidence of an acute osseous abnormality.   There are no suspicious-appearing parenchymal soft tissue nodules.            Impression:       No evidence of active or acute cardiopulmonary disease on today's chest  radiograph.         This report was finalized on 9/11/2018 9:28 AM by Dr. Hernesto Alaniz MD.       CT Chest Pulmonary Embolism With Contrast [[id number]] Collected:  09/11/18 0713     Updated:  09/11/18 0915    Narrative:       CT CHEST PULMONARY EMBOLISM WITH CONTRAST-     CLINICAL INDICATION: SOB, tachycardia, +D-dimer.          COMPARISON: 07/29/2017.      PROCEDURE: Thin cut axial images were acquired through the pulmonary  vessels during the rapid infusion of IV contrast.     Additional 3-D reformatted images obtained via post-processing for  improved diagnostic accuracy and procedural planning.     Radiation dose reduction techniques were utilized per ALARA protocol.  Automated exposure control was initiated through either or [employer] or  DoseRight software packages by  protocol.           FINDINGS: Today's study demonstrates opacification of the central  pulmonary vessels.  Bilateral filling defects are present compatible with extensive  bilateral pulmonary emboli..     Otherwise there are no parenchymal soft tissue nodules or masses.     There is no mediastinal lymph node enlargement     No pericardial or pleural effusion.          Impression:       Extensive bilateral pulmonary emboli.     This report was finalized on 9/11/2018 9:13 AM by Dr. Hernesto Alaniz MD.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

## 2018-09-11 NOTE — PROGRESS NOTES
Pharmacy was consulted to dose lovenox for a DVT/PE (active thrombus). Based on an actual body weight of 127kg and CrCl of 71mL/min, a dose of 130mg q12hr has been ordered. Thank you for the consult.     Thank you,   Suki Cuellar [facility]  9:02 PM

## 2018-09-11 NOTE — PROGRESS NOTES
Discharge Planning Assessment   Fabricio     Patient Name: Ken Krueger  MRN: [id number]  Today's Date: 9/11/2018    Admit Date: 9/10/2018          Discharge Needs Assessment     Row Name 09/11/18 1309       Living Environment    Lives With sibling(s)    Current Living Arrangements home/apartment/condo    Quality of Family Relationships helpful;involved;supportive       Resource/Environmental Concerns    Resource/Environmental Concerns none       Transition Planning    Patient/Family Anticipates Transition to home with family       Discharge Needs Assessment    Equipment Currently Used at Home bipap/cpap;hospital bed;wheelchair;power chair,(recliner lift)    Equipment Needed After Discharge none            Discharge Plan     Row Name 09/11/18 1307       Plan    Plan Pt lives at home with his brother. Pt has good support at home. Pt utilizes [facility]. SS received consult for [facility]. SS spoke with Laurence, who is agreeable to look at pt's information. SS faxed pt's information on this date. SS will follow and assist as needed.     SS spoke with [facility] per Laurence, who states that pt will need a 3 night stay in CCU/PCU before they will be able to accept. SS will follow.     Patient/Family in Agreement with Plan yes        Demographic Summary     Row Name 09/11/18 1301       General Information    Reason for Consult discharge planning    Preferred Language English     Used During This Interaction yes          Lin Núñez

## 2018-09-11 NOTE — NURSING NOTE
Speciality bed ordered,  Will place NPWT 9-12-18.  Stage 4 to left and right gluteal  Stage 2 to coccyx  Present on admit  Treatment ordered     09/11/18 1035   Wound 09/10/18 2000 Left gluteal pressure injury   Date first assessed/Time first assessed: 09/10/18 2000   Present On Admission : yes  Side: Left  Location: gluteal  Type: pressure injury  Stage, Pressure Injury: Stage 4   Dressing Appearance moist drainage   Base (unable to see base of wound related to opening dimension )   Periwound redness;pink   Wound Length (cm) 5 cm   Wound Width (cm) 2.5 cm   Wound Depth (cm) 9 cm   Drainage Characteristics/Odor purulent   Drainage Amount moderate   Wound 09/10/18 2000 Right gluteal pressure injury   Date first assessed/Time first assessed: 09/10/18 2000   Present On Admission : yes  Side: Right  Location: gluteal  Type: pressure injury  Stage, Pressure Injury: Stage 4   Dressing Appearance moist drainage   Base (unable to see base related to size of opening)   Periwound pink;redness   Edges irregular   Wound Length (cm) 4 cm   Wound Width (cm) 2 cm   Wound Depth (cm) 8.5 cm   Drainage Characteristics/Odor purulent   Drainage Amount moderate   Wound 09/11/18 coccyx pressure injury   Date first assessed: 09/11/18   Present On Admission : yes  Location: coccyx  Type: pressure injury  Stage, Pressure Injury: Stage 2   Dressing Appearance open to air   Base pink;moist   Wound Length (cm) 0.4 cm   Wound Width (cm) 0.4 cm   Wound Depth (cm) 0.1 cm
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
## 2018-09-11 NOTE — PLAN OF CARE
Problem: Patient Care Overview  Goal: Plan of Care Review  Outcome: Ongoing (interventions implemented as appropriate)      Problem: VTE, DVT and PE (Adult)  Goal: Signs and Symptoms of Listed Potential Problems Will be Absent, Minimized or Managed (VTE, DVT and PE)  Outcome: Ongoing (interventions implemented as appropriate)      Problem: Fall Risk (Adult)  Goal: Identify Related Risk Factors and Signs and Symptoms  Outcome: Outcome(s) achieved Date Met: 09/10/18    Goal: Absence of Fall  Outcome: Ongoing (interventions implemented as appropriate)      Problem: Skin Injury Risk (Adult)  Goal: Identify Related Risk Factors and Signs and Symptoms  Outcome: Outcome(s) achieved Date Met: 09/10/18    Goal: Skin Health and Integrity  Outcome: Ongoing (interventions implemented as appropriate)      Problem: Wound (Includes Pressure Injury) (Adult)  Goal: Signs and Symptoms of Listed Potential Problems Will be Absent, Minimized or Managed (Wound)  Outcome: Ongoing (interventions implemented as appropriate)

## 2018-09-11 NOTE — PROGRESS NOTES
The patient continues on vancomycin therapy for skin and soft tissue infection.  The vancomycin level 9 hrs after a 1750mg dose was reported as 28.9 mcg/ml which exceeds the goal range for troughs for this agent.  We will perform serial levels on this patient to identify the next dosing opportunity and monitor with you.

## 2018-09-11 NOTE — PLAN OF CARE

## 2018-09-11 NOTE — PROGRESS NOTES
Nutrition Services    Patient Name:  Ken Krueger  YOB: 1975  MRN: [id number]  Admit Date:  9/10/2018    Recommend MVI daily to aide with wound healing.     Electronically signed by:  Rin Medina RD  09/11/18 12:52 PM

## 2018-09-12 ENCOUNTER — APPOINTMENT (OUTPATIENT)
Dept: ULTRASOUND IMAGING | Facility: HOSPITAL | Age: 43
End: 2018-09-12

## 2018-09-12 PROBLEM — M86.60 CHRONIC OSTEOMYELITIS: Status: ACTIVE | Noted: 2018-09-12

## 2018-09-12 LAB
ALBUMIN SERPL-MCNC: 3.6 G/DL (ref 3.5–5)
ALBUMIN/GLOB SERPL: 1 G/DL (ref 1.5–2.5)
ALP SERPL-CCNC: 64 U/L (ref 40–129)
ALT SERPL W P-5'-P-CCNC: 24 U/L (ref 10–44)
ANION GAP SERPL CALCULATED.3IONS-SCNC: 9.5 MMOL/L (ref 3.6–11.2)
AST SERPL-CCNC: 19 U/L (ref 10–34)
BASOPHILS # BLD AUTO: 0.03 10*3/MM3 (ref 0–0.3)
BASOPHILS NFR BLD AUTO: 0.3 % (ref 0–2)
BILIRUB SERPL-MCNC: 0.4 MG/DL (ref 0.2–1.8)
BUN BLD-MCNC: 15 MG/DL (ref 7–21)
BUN/CREAT SERPL: 14.6 (ref 7–25)
CALCIUM SPEC-SCNC: 8.6 MG/DL (ref 7.7–10)
CHLORIDE SERPL-SCNC: 107 MMOL/L (ref 99–112)
CO2 SERPL-SCNC: 19.5 MMOL/L (ref 24.3–31.9)
CREAT BLD-MCNC: 1.03 MG/DL (ref 0.43–1.29)
DEPRECATED RDW RBC AUTO: 45.1 FL (ref 37–54)
EOSINOPHIL # BLD AUTO: 0.31 10*3/MM3 (ref 0–0.7)
EOSINOPHIL NFR BLD AUTO: 2.8 % (ref 0–5)
ERYTHROCYTE [DISTWIDTH] IN BLOOD BY AUTOMATED COUNT: 14.1 % (ref 11.5–14.5)
GFR SERPL CREATININE-BSD FRML MDRD: 80 ML/MIN/1.73
GLOBULIN UR ELPH-MCNC: 3.5 GM/DL
GLUCOSE BLD-MCNC: 129 MG/DL (ref 70–110)
GLUCOSE BLDC GLUCOMTR-MCNC: 105 MG/DL (ref 70–130)
GLUCOSE BLDC GLUCOMTR-MCNC: 122 MG/DL (ref 70–130)
GLUCOSE BLDC GLUCOMTR-MCNC: 161 MG/DL (ref 70–130)
HCT VFR BLD AUTO: 34.5 % (ref 42–52)
HGB BLD-MCNC: 10.1 G/DL (ref 14–18)
IMM GRANULOCYTES # BLD: 0.03 10*3/MM3 (ref 0–0.03)
IMM GRANULOCYTES NFR BLD: 0.3 % (ref 0–0.5)
LYMPHOCYTES # BLD AUTO: 2.62 10*3/MM3 (ref 1–3)
LYMPHOCYTES NFR BLD AUTO: 23.7 % (ref 21–51)
MCH RBC QN AUTO: 26.2 PG (ref 27–33)
MCHC RBC AUTO-ENTMCNC: 29.3 G/DL (ref 33–37)
MCV RBC AUTO: 89.4 FL (ref 80–94)
MONOCYTES # BLD AUTO: 0.5 10*3/MM3 (ref 0.1–0.9)
MONOCYTES NFR BLD AUTO: 4.5 % (ref 0–10)
NEUTROPHILS # BLD AUTO: 7.56 10*3/MM3 (ref 1.4–6.5)
NEUTROPHILS NFR BLD AUTO: 68.4 % (ref 30–70)
OSMOLALITY SERPL CALC.SUM OF ELEC: 274.5 MOSM/KG (ref 273–305)
PLATELET # BLD AUTO: 285 10*3/MM3 (ref 130–400)
PMV BLD AUTO: 10.6 FL (ref 6–10)
POTASSIUM BLD-SCNC: 3.4 MMOL/L (ref 3.5–5.3)
PROT SERPL-MCNC: 7.1 G/DL (ref 6–8)
RBC # BLD AUTO: 3.86 10*6/MM3 (ref 4.7–6.1)
SODIUM BLD-SCNC: 136 MMOL/L (ref 135–153)
TROPONIN I SERPL-MCNC: 0.05 NG/ML
TROPONIN I SERPL-MCNC: 0.06 NG/ML
VANCOMYCIN TROUGH SERPL-MCNC: 9.2 MCG/ML (ref 5–15)
WBC NRBC COR # BLD: 11.05 10*3/MM3 (ref 4.5–12.5)

## 2018-09-12 PROCEDURE — 80202 ASSAY OF VANCOMYCIN: CPT | Performed by: HOSPITALIST

## 2018-09-12 PROCEDURE — 85025 COMPLETE CBC W/AUTO DIFF WBC: CPT | Performed by: HOSPITALIST

## 2018-09-12 PROCEDURE — 93971 EXTREMITY STUDY: CPT

## 2018-09-12 PROCEDURE — 93971 EXTREMITY STUDY: CPT | Performed by: RADIOLOGY

## 2018-09-12 PROCEDURE — 99233 SBSQ HOSP IP/OBS HIGH 50: CPT | Performed by: HOSPITALIST

## 2018-09-12 PROCEDURE — 82962 GLUCOSE BLOOD TEST: CPT

## 2018-09-12 PROCEDURE — 84484 ASSAY OF TROPONIN QUANT: CPT | Performed by: HOSPITALIST

## 2018-09-12 PROCEDURE — 99221 1ST HOSP IP/OBS SF/LOW 40: CPT | Performed by: NURSE PRACTITIONER

## 2018-09-12 PROCEDURE — 25010000002 ENOXAPARIN PER 10 MG: Performed by: HOSPITALIST

## 2018-09-12 PROCEDURE — 25010000002 VANCOMYCIN PER 500 MG: Performed by: INTERNAL MEDICINE

## 2018-09-12 PROCEDURE — 25010000002 KETOROLAC TROMETHAMINE PER 15 MG: Performed by: INTERNAL MEDICINE

## 2018-09-12 PROCEDURE — 94799 UNLISTED PULMONARY SVC/PX: CPT

## 2018-09-12 PROCEDURE — 99232 SBSQ HOSP IP/OBS MODERATE 35: CPT | Performed by: INTERNAL MEDICINE

## 2018-09-12 PROCEDURE — 80053 COMPREHEN METABOLIC PANEL: CPT | Performed by: HOSPITALIST

## 2018-09-12 RX ORDER — POTASSIUM CHLORIDE 20 MEQ/1
40 TABLET, EXTENDED RELEASE ORAL EVERY 4 HOURS
Status: COMPLETED | OUTPATIENT
Start: 2018-09-12 | End: 2018-09-12

## 2018-09-12 RX ORDER — KETOROLAC TROMETHAMINE 30 MG/ML
30 INJECTION, SOLUTION INTRAMUSCULAR; INTRAVENOUS EVERY 6 HOURS PRN
Status: DISCONTINUED | OUTPATIENT
Start: 2018-09-12 | End: 2018-09-14 | Stop reason: HOSPADM

## 2018-09-12 RX ORDER — MAGNESIUM HYDROXIDE 1200 MG/15ML
LIQUID ORAL
Status: COMPLETED
Start: 2018-09-12 | End: 2018-09-12

## 2018-09-12 RX ORDER — L.ACID,PARA/B.BIFIDUM/S.THERM 8B CELL
1 CAPSULE ORAL DAILY
Status: DISCONTINUED | OUTPATIENT
Start: 2018-09-12 | End: 2018-09-14 | Stop reason: HOSPADM

## 2018-09-12 RX ADMIN — METHENAMINE HIPPURATE 1 G: 1000 TABLET ORAL at 10:07

## 2018-09-12 RX ADMIN — METHENAMINE HIPPURATE 1 G: 1000 TABLET ORAL at 20:21

## 2018-09-12 RX ADMIN — ENOXAPARIN SODIUM 130 MG: 80 INJECTION SUBCUTANEOUS at 20:20

## 2018-09-12 RX ADMIN — SUCRALFATE 1 G: 1 TABLET ORAL at 20:18

## 2018-09-12 RX ADMIN — ARIPIPRAZOLE 10 MG: 10 TABLET ORAL at 20:18

## 2018-09-12 RX ADMIN — ENOXAPARIN SODIUM 130 MG: 80 INJECTION SUBCUTANEOUS at 08:42

## 2018-09-12 RX ADMIN — POTASSIUM CHLORIDE 40 MEQ: 1500 TABLET, EXTENDED RELEASE ORAL at 10:06

## 2018-09-12 RX ADMIN — GABAPENTIN 800 MG: 400 CAPSULE ORAL at 13:05

## 2018-09-12 RX ADMIN — Medication 1 CAPSULE: at 13:05

## 2018-09-12 RX ADMIN — GABAPENTIN 800 MG: 400 CAPSULE ORAL at 06:09

## 2018-09-12 RX ADMIN — POTASSIUM CHLORIDE 40 MEQ: 1500 TABLET, EXTENDED RELEASE ORAL at 04:01

## 2018-09-12 RX ADMIN — POTASSIUM CHLORIDE 40 MEQ: 1500 TABLET, EXTENDED RELEASE ORAL at 08:43

## 2018-09-12 RX ADMIN — BACLOFEN 30 MG: 10 TABLET ORAL at 13:05

## 2018-09-12 RX ADMIN — SODIUM CHLORIDE: 900 IRRIGANT IRRIGATION at 14:15

## 2018-09-12 RX ADMIN — KETOROLAC TROMETHAMINE 30 MG: 30 INJECTION, SOLUTION INTRAMUSCULAR; INTRAVENOUS at 01:28

## 2018-09-12 RX ADMIN — PANTOPRAZOLE SODIUM 40 MG: 40 TABLET, DELAYED RELEASE ORAL at 08:43

## 2018-09-12 RX ADMIN — VANCOMYCIN HYDROCHLORIDE 2000 MG: 5 INJECTION, POWDER, LYOPHILIZED, FOR SOLUTION INTRAVENOUS at 11:55

## 2018-09-12 RX ADMIN — DULOXETINE HYDROCHLORIDE 60 MG: 60 CAPSULE, DELAYED RELEASE ORAL at 08:43

## 2018-09-12 RX ADMIN — METHENAMINE HIPPURATE 1 G: 1000 TABLET ORAL at 20:17

## 2018-09-12 RX ADMIN — BACLOFEN 30 MG: 10 TABLET ORAL at 08:43

## 2018-09-12 RX ADMIN — MODAFINIL 200 MG: 100 TABLET ORAL at 10:06

## 2018-09-12 RX ADMIN — METRONIDAZOLE 500 MG: 500 INJECTION, SOLUTION INTRAVENOUS at 11:54

## 2018-09-12 RX ADMIN — ATORVASTATIN CALCIUM 10 MG: 10 TABLET, FILM COATED ORAL at 20:18

## 2018-09-12 RX ADMIN — METRONIDAZOLE 500 MG: 500 INJECTION, SOLUTION INTRAVENOUS at 16:38

## 2018-09-12 RX ADMIN — BACLOFEN 30 MG: 10 TABLET ORAL at 20:18

## 2018-09-12 RX ADMIN — GABAPENTIN 800 MG: 400 CAPSULE ORAL at 20:19

## 2018-09-12 RX ADMIN — BACLOFEN 30 MG: 10 TABLET ORAL at 16:39

## 2018-09-12 NOTE — PLAN OF CARE
Problem: Patient Care Overview  Goal: Individualization and Mutuality  Outcome: Ongoing (interventions implemented as appropriate)    Goal: Discharge Needs Assessment  Outcome: Ongoing (interventions implemented as appropriate)    Goal: Interprofessional Rounds/Family Conf  Outcome: Ongoing (interventions implemented as appropriate)      Problem: VTE, DVT and PE (Adult)  Goal: Signs and Symptoms of Listed Potential Problems Will be Absent, Minimized or Managed (VTE, DVT and PE)  Outcome: Ongoing (interventions implemented as appropriate)      Problem: Fall Risk (Adult)  Goal: Absence of Fall  Outcome: Ongoing (interventions implemented as appropriate)      Problem: Skin Injury Risk (Adult)  Goal: Skin Health and Integrity  Outcome: Ongoing (interventions implemented as appropriate)      Problem: Wound (Includes Pressure Injury) (Adult)  Goal: Signs and Symptoms of Listed Potential Problems Will be Absent, Minimized or Managed (Wound)  Outcome: Ongoing (interventions implemented as appropriate)      Problem: Patient Care Overview  Goal: Plan of Care Review  Outcome: Ongoing (interventions implemented as appropriate)    Goal: Individualization and Mutuality  Outcome: Ongoing (interventions implemented as appropriate)    Goal: Discharge Needs Assessment  Outcome: Ongoing (interventions implemented as appropriate)    Goal: Interprofessional Rounds/Family Conf  Outcome: Ongoing (interventions implemented as appropriate)      Problem: Diabetes, Type 2 (Adult)  Goal: Signs and Symptoms of Listed Potential Problems Will be Absent, Minimized or Managed (Diabetes, Type 2)  Outcome: Ongoing (interventions implemented as appropriate)
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
## 2018-09-12 NOTE — PROGRESS NOTES
Discharge Planning Assessment  MAKI Regalado     Patient Name: Ken Krueger  MRN: [id number]  Today's Date: 9/12/2018    Admit Date: 9/10/2018      Discharge Plan     Row Name 09/12/18 1430       Plan    Plan Per Laurence at [facility] [place] is reviewing pt for possible admit to [facility].  SS will follow.                 Sana Otero
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
## 2018-09-12 NOTE — PLAN OF CARE
Problem: VTE, DVT and PE (Adult)  Goal: Signs and Symptoms of Listed Potential Problems Will be Absent, Minimized or Managed (VTE, DVT and PE)  Outcome: Ongoing (interventions implemented as appropriate)      Problem: Fall Risk (Adult)  Goal: Absence of Fall  Outcome: Ongoing (interventions implemented as appropriate)      Problem: Skin Injury Risk (Adult)  Goal: Skin Health and Integrity  Outcome: Ongoing (interventions implemented as appropriate)      Problem: Wound (Includes Pressure Injury) (Adult)  Goal: Signs and Symptoms of Listed Potential Problems Will be Absent, Minimized or Managed (Wound)  Outcome: Ongoing (interventions implemented as appropriate)      Problem: Patient Care Overview  Goal: Plan of Care Review  Outcome: Ongoing (interventions implemented as appropriate)    Goal: Individualization and Mutuality  Outcome: Ongoing (interventions implemented as appropriate)      Problem: Diabetes, Type 2 (Adult)  Goal: Signs and Symptoms of Listed Potential Problems Will be Absent, Minimized or Managed (Diabetes, Type 2)  Outcome: Ongoing (interventions implemented as appropriate)

## 2018-09-12 NOTE — PROGRESS NOTES
LOS: 2 days     Chief Complaint:  Pulmonology is following for pulmonary embolism    Subjective     Interval History: Mr. Krueger is resting in bed.  He states that he is feeling much better today.  He states that he did not like the biap last night.  Will change that to cpap. No acute distress noted.  He is saturating 99% on 2 liters nasal cannula.    History taken from: patient chart RN    Review of Systems:   Review of Systems - History obtained from chart review and the patient  General ROS: negative for - chills, fatigue or fever  Psychological ROS: negative for - anxiety or depression  ENT ROS: negative for - headaches, visual changes or vocal changes  Allergy and Immunology ROS: negative for - nasal congestion, postnasal drip or seasonal allergies  Endocrine ROS: negative for - polydipsia/polyuria  Respiratory ROS: positive for - shortness of breath  Cardiovascular ROS: no chest pain or dyspnea on exertion  Gastrointestinal ROS: no abdominal pain, change in bowel habits, or black or bloody stools  Musculoskeletal ROS: negative for - joint pain, joint stiffness or joint swelling  Neurological ROS: no TIA or stroke symptoms                    Objective     Vital Signs  Temp:  [97.5 °F (36.4 °C)-98.6 °F (37 °C)] 97.6 °F (36.4 °C)  Heart Rate:  [] 90  Resp:  [16-24] 18  BP: ()/(56-97) 110/62  Body mass index is 39.48 kg/m².    Intake/Output Summary (Last 24 hours) at 09/12/18 1154  Last data filed at 09/12/18 0847   Gross per 24 hour   Intake             1064 ml   Output              975 ml   Net               89 ml     I/O this shift:  In: 354 [P.O.:354]  Out: -     Physical Exam:  GENERAL APPEARANCE: Well developed, well nourished, alert and cooperative, and appears to be in no acute distress.    HEAD: normocephalic.    EYES: PERRL    NECK: Neck supple.     CARDIAC: Normal S1 and S2. No S3, S4 or murmurs. Rhythm is regular. There is no peripheral edema, cyanosis or pallor. Extremities are warm and  well perfused. Capillary refill is less than 2 seconds. No carotid bruits.    RESPIRATORY: Clear to auscultation and percussion without rales, rhonchi, wheezing or diminished breath sounds.    GI: Positive bowel sounds. Soft, nondistended, nontender.     MUSCULOSKELTAL: No significant deformity or joint abnormality. No edema. Peripheral pulses intact.     NEUROLOGICAL: Strength and sensation symmetric and intact throughout.     PSYCHIATRIC: The mental examination revealed the patient was oriented to person, place, and time.                 Results Review:                I reviewed the patient's new clinical results.  I reviewed the patient's new imaging results and agree with the interpretation.    Results from last 7 days  Lab Units 09/12/18  0105 09/11/18  0055 09/10/18  1702   WBC 10*3/mm3 11.05 10.40 12.34   HEMOGLOBIN g/dL 10.1* 10.2* 12.7*   PLATELETS 10*3/mm3 285 295 353       Results from last 7 days  Lab Units 09/12/18  0105 09/11/18  0055 09/10/18  2113 09/10/18  1702   SODIUM mmol/L 136 140  --  144   POTASSIUM mmol/L 3.4* 3.1*  --  3.3*   CHLORIDE mmol/L 107 108  --  110   CO2 mmol/L 19.5* 23.7*  --  24.1*   BUN mg/dL 15 15  --  15   CREATININE mg/dL 1.03 1.00  --  1.13   CALCIUM mg/dL 8.6 8.4  --  9.2   GLUCOSE mg/dL 129* 143*  --  151*   MAGNESIUM mg/dL  --   --  1.6*  --      Lab Results   Component Value Date    INR 1.12 (H) 09/10/2018    INR 1.11 (H) 08/18/2018    INR 1.19 (H) 11/05/2017    PROTIME 14.6 09/10/2018    PROTIME 14.5 08/18/2018    PROTIME 15.3 11/05/2017       Results from last 7 days  Lab Units 09/12/18  0105 09/11/18  0055 09/10/18  1702   ALK PHOS U/L 64 63 79   BILIRUBIN mg/dL 0.4 0.3 0.4   ALT (SGPT) U/L 24 26 32   AST (SGOT) U/L 19 20 30       Results from last 7 days  Lab Units 09/11/18  0940   PH, ARTERIAL pH units 7.448   PO2 ART mm Hg 76.9*   PCO2, ARTERIAL mm Hg 30.8*   HCO3 ART mmol/L 20.8*     Imaging Results (last 24 hours)     Procedure Component Value Units Date/Time     US Venous Doppler Lower Extremity Right (duplex) [[id number]] Collected:  09/11/18 1124     Updated:  09/11/18 1256    Narrative:       US VENOUS DOPPLER LOWER EXTREMITY RIGHT (DUPLEX)-     CLINICAL INDICATION: PE, look for DVT; I26.99-Other pulmonary embolism  without acute cor pulmonale  Right lower extremity pain and swelling     COMPARISON: None available      TECHNIQUE: Color Doppler imaging was used with compression and  augmentation to evaluate the right lower extremity deep venous system.     FINDINGS:   There is patent spontaneous flow from the common femoral vein through  the posterior tibial veins.  There was no internal clot or area of noncompressibility in the right  lower extremity.  Normal augmentation was elicited where applicable.            Impression:       No DVT in the right lower extremity on today's exam.      This report was finalized on 9/11/2018 11:25 AM by Dr. Hernesto Alaniz MD.                Medication Review:   Scheduled Medications:    ARIPiprazole 10 mg Oral Nightly   atorvastatin 10 mg Oral Nightly   baclofen 30 mg Oral 4x Daily With Meals & Nightly   DULoxetine 60 mg Oral Daily   enoxaparin 1 mg/kg Subcutaneous Q12H   gabapentin 800 mg Oral Q8H   insulin aspart 0-7 Units Subcutaneous 4x Daily AC & at Bedtime   insulin detemir 15 Units Subcutaneous BID   lactobacillus acidophilus 1 capsule Oral Daily   methenamine 1 g Oral BID   metroNIDAZOLE 500 mg Intravenous Q8H   modafinil 200 mg Oral Daily   pantoprazole 40 mg Oral Daily   potassium chloride 40 mEq Oral Daily   sucralfate 1 g Oral Nightly   vancomycin 2,000 mg Intravenous Once     Continuous infusions:    Pharmacy to dose vancomycin        Assessment/Plan      Pulmonary embolism: Submassive PE- right ventricular starin- continue Lovenox.  Recommend changing patient to oral anticoagulation.  Hematology is following.    PICC in the right arm.  Ultrasound ordered to rule out DVT.     ARLIN:  patient did not want BiPAP last night.   We'll change him to CPAP with pressure of 1430 FiO2     ID: Continue current IV antibiotics.     Elevated troponin: Echo reviewed EF is 45%.  Cardiology is following.    Recommend a repeat echo in 3-6 months.    Hypokalemic: Replaced per protocol.    Osteomyelitis: Infectious disease consulted for further antibiotic management.    Patient Active Problem List   Diagnosis Code   • Infected decubitus ulcer L89.90, L08.9   • Decubitus skin ulcer L89.90   • Sepsis (CMS/HCC) A41.9   • DM2 (diabetes mellitus, type 2) (CMS/HCC) E11.9   • Osteomyelitis of pelvic region, acute (CMS/HCC) M86.159   • Decubitus ulcer of right buttock L89.319   • Pulmonary embolus (CMS/HCC) I26.99   • Bilateral pulmonary embolism (CMS/HCC) I26.99             GUANACO Dumont  09/12/18  11:54 AM        Attestation: Scribed for Dr. Sampson, by GUANACO Nuñez      I, Modesto Sampson M.D. attest that the above note accurately reflects the work and decisions made  by me.  Patient was seen and evaluated by Dr. Sampson, including history of present illness, physical exam, assessment, and treatment plan.  The above note was reviewed and edited by Dr. Sampson.

## 2018-09-12 NOTE — CONSULTS
"Diabetes Education  Assessment/Teaching    Patient Name:  Ken Krueger  YOB: 1975  MRN: [id number]  Admit Date:  9/10/2018      Assessment Date:  9/12/2018    Most Recent Value   General Information    Height  180.3 cm (71\")   Height Method  Stated   Weight  128 kg (283 lb 1.6 oz)   Weight Method  Bed scale [wt on sand bed]   Pregnancy Assessment   Diabetes History   What type of diabetes do you have?  Type 2   Length of Diabetes Diagnosis  -- [saw client in July when A1c was 12 \"newly diagnosed\"]   Current DM knowledge  fair   Do you test your blood sugar at home?  yes   What makes it difficult for you to take care of your diabetes or yourself?  paraplegic that brother assists with care and also\"friend that helps me out at home\"    Do you have any diabetes complications?  other (comment)   Education Preferences   Nutrition Information   What type of support do you currently use to help you with your health issues?  discussed gatorade on table \" just sip on it\"   Assessment Topics   Healthy Eating - Assessment  Competent   Being Active - Assessment  Competent   Taking Medication - Assessment  Competent   Problem Solving - Assessment  Competent   Reducing Risk - Assessment  Competent   Healthy Coping - Assessment  Competent   Monitoring - Assessment  Competent   DM Goals            Most Recent Value   DM Education Needs   Meter  Has own   Medication  Insulin, Other injectables, Oral, Actions, Side effects   Problem Solving  Hypoglycemia, Hyperglycemia, Sick days, Signs, Symptoms, Treatment   Reducing Risks  A1C testing [9, discussed]   Healthy Eating  Other (comment) [explained order for carbohydrates & cardiac diet  that is ordered]   Healthy Coping  Appropriate   Discharge Plan  Home, Other (comment) [says ,\"yes have [facility] come to my home\"]   Motivation  Moderate   Teaching Method  Explanation, Discussion, Handouts, Teach back   Patient Response  Verbalized understanding            Other Comments:  "         Electronically signed by:  Hollie Estrada RN  09/12/18 4:18 PM

## 2018-09-13 LAB
ALBUMIN SERPL-MCNC: 3.7 G/DL (ref 3.5–5)
ALBUMIN/GLOB SERPL: 1 G/DL (ref 1.5–2.5)
ALP SERPL-CCNC: 68 U/L (ref 40–129)
ALT SERPL W P-5'-P-CCNC: 26 U/L (ref 10–44)
ANION GAP SERPL CALCULATED.3IONS-SCNC: 8.5 MMOL/L (ref 3.6–11.2)
AST SERPL-CCNC: 19 U/L (ref 10–34)
BASOPHILS # BLD AUTO: 0.02 10*3/MM3 (ref 0–0.3)
BASOPHILS NFR BLD AUTO: 0.3 % (ref 0–2)
BILIRUB SERPL-MCNC: 0.2 MG/DL (ref 0.2–1.8)
BUN BLD-MCNC: 22 MG/DL (ref 7–21)
BUN/CREAT SERPL: 20.8 (ref 7–25)
CALCIUM SPEC-SCNC: 8.8 MG/DL (ref 7.7–10)
CHLORIDE SERPL-SCNC: 112 MMOL/L (ref 99–112)
CO2 SERPL-SCNC: 22.5 MMOL/L (ref 24.3–31.9)
CREAT BLD-MCNC: 1.06 MG/DL (ref 0.43–1.29)
DEPRECATED RDW RBC AUTO: 45.9 FL (ref 37–54)
EOSINOPHIL # BLD AUTO: 0.21 10*3/MM3 (ref 0–0.7)
EOSINOPHIL NFR BLD AUTO: 2.7 % (ref 0–5)
ERYTHROCYTE [DISTWIDTH] IN BLOOD BY AUTOMATED COUNT: 14.3 % (ref 11.5–14.5)
GFR SERPL CREATININE-BSD FRML MDRD: 77 ML/MIN/1.73
GLOBULIN UR ELPH-MCNC: 3.7 GM/DL
GLUCOSE BLD-MCNC: 113 MG/DL (ref 70–110)
GLUCOSE BLDC GLUCOMTR-MCNC: 120 MG/DL (ref 70–130)
GLUCOSE BLDC GLUCOMTR-MCNC: 124 MG/DL (ref 70–130)
GLUCOSE BLDC GLUCOMTR-MCNC: 130 MG/DL (ref 70–130)
GLUCOSE BLDC GLUCOMTR-MCNC: 138 MG/DL (ref 70–130)
GLUCOSE BLDC GLUCOMTR-MCNC: 153 MG/DL (ref 70–130)
HCT VFR BLD AUTO: 32.9 % (ref 42–52)
HGB BLD-MCNC: 9.8 G/DL (ref 14–18)
IMM GRANULOCYTES # BLD: 0.03 10*3/MM3 (ref 0–0.03)
IMM GRANULOCYTES NFR BLD: 0.4 % (ref 0–0.5)
LYMPHOCYTES # BLD AUTO: 1.72 10*3/MM3 (ref 1–3)
LYMPHOCYTES NFR BLD AUTO: 22.3 % (ref 21–51)
MAGNESIUM SERPL-MCNC: 1.7 MG/DL (ref 1.7–2.6)
MCH RBC QN AUTO: 26.2 PG (ref 27–33)
MCHC RBC AUTO-ENTMCNC: 29.8 G/DL (ref 33–37)
MCV RBC AUTO: 88 FL (ref 80–94)
MONOCYTES # BLD AUTO: 0.26 10*3/MM3 (ref 0.1–0.9)
MONOCYTES NFR BLD AUTO: 3.4 % (ref 0–10)
NEUTROPHILS # BLD AUTO: 5.49 10*3/MM3 (ref 1.4–6.5)
NEUTROPHILS NFR BLD AUTO: 70.9 % (ref 30–70)
NRBC BLD MANUAL-RTO: 0 /100 WBC (ref 0–0)
OSMOLALITY SERPL CALC.SUM OF ELEC: 289.1 MOSM/KG (ref 273–305)
PLATELET # BLD AUTO: 303 10*3/MM3 (ref 130–400)
PMV BLD AUTO: 11 FL (ref 6–10)
POTASSIUM BLD-SCNC: 4.6 MMOL/L (ref 3.5–5.3)
PROT SERPL-MCNC: 7.4 G/DL (ref 6–8)
RBC # BLD AUTO: 3.74 10*6/MM3 (ref 4.7–6.1)
SODIUM BLD-SCNC: 143 MMOL/L (ref 135–153)
VANCOMYCIN TROUGH SERPL-MCNC: 20.4 MCG/ML (ref 5–15)
WBC NRBC COR # BLD: 7.73 10*3/MM3 (ref 4.5–12.5)

## 2018-09-13 PROCEDURE — 63710000001 INSULIN ASPART PER 5 UNITS: Performed by: NURSE PRACTITIONER

## 2018-09-13 PROCEDURE — 94799 UNLISTED PULMONARY SVC/PX: CPT

## 2018-09-13 PROCEDURE — 63710000001 INSULIN DETEMIR PER 5 UNITS: Performed by: HOSPITALIST

## 2018-09-13 PROCEDURE — 25010000002 ENOXAPARIN PER 10 MG: Performed by: HOSPITALIST

## 2018-09-13 PROCEDURE — 25010000002 VANCOMYCIN PER 500 MG

## 2018-09-13 PROCEDURE — 80202 ASSAY OF VANCOMYCIN: CPT | Performed by: INTERNAL MEDICINE

## 2018-09-13 PROCEDURE — 82962 GLUCOSE BLOOD TEST: CPT

## 2018-09-13 PROCEDURE — 99232 SBSQ HOSP IP/OBS MODERATE 35: CPT | Performed by: HOSPITALIST

## 2018-09-13 PROCEDURE — 99232 SBSQ HOSP IP/OBS MODERATE 35: CPT | Performed by: INTERNAL MEDICINE

## 2018-09-13 PROCEDURE — 80053 COMPREHEN METABOLIC PANEL: CPT | Performed by: HOSPITALIST

## 2018-09-13 PROCEDURE — 94640 AIRWAY INHALATION TREATMENT: CPT

## 2018-09-13 PROCEDURE — 83735 ASSAY OF MAGNESIUM: CPT | Performed by: HOSPITALIST

## 2018-09-13 PROCEDURE — 94660 CPAP INITIATION&MGMT: CPT

## 2018-09-13 PROCEDURE — 85025 COMPLETE CBC W/AUTO DIFF WBC: CPT | Performed by: HOSPITALIST

## 2018-09-13 RX ORDER — IPRATROPIUM BROMIDE AND ALBUTEROL SULFATE 2.5; .5 MG/3ML; MG/3ML
3 SOLUTION RESPIRATORY (INHALATION)
Status: DISCONTINUED | OUTPATIENT
Start: 2018-09-13 | End: 2018-09-14 | Stop reason: HOSPADM

## 2018-09-13 RX ORDER — ACETYLCYSTEINE 200 MG/ML
4 SOLUTION ORAL; RESPIRATORY (INHALATION)
Status: DISCONTINUED | OUTPATIENT
Start: 2018-09-13 | End: 2018-09-14 | Stop reason: HOSPADM

## 2018-09-13 RX ORDER — IPRATROPIUM BROMIDE AND ALBUTEROL SULFATE 2.5; .5 MG/3ML; MG/3ML
3 SOLUTION RESPIRATORY (INHALATION)
Status: DISCONTINUED | OUTPATIENT
Start: 2018-09-13 | End: 2018-09-13

## 2018-09-13 RX ADMIN — GABAPENTIN 800 MG: 400 CAPSULE ORAL at 05:25

## 2018-09-13 RX ADMIN — PANTOPRAZOLE SODIUM 40 MG: 40 TABLET, DELAYED RELEASE ORAL at 10:02

## 2018-09-13 RX ADMIN — METHENAMINE HIPPURATE 1 G: 1000 TABLET ORAL at 21:01

## 2018-09-13 RX ADMIN — VANCOMYCIN HYDROCHLORIDE 2000 MG: 5 INJECTION, POWDER, LYOPHILIZED, FOR SOLUTION INTRAVENOUS at 15:00

## 2018-09-13 RX ADMIN — MODAFINIL 200 MG: 100 TABLET ORAL at 10:01

## 2018-09-13 RX ADMIN — GABAPENTIN 800 MG: 400 CAPSULE ORAL at 20:35

## 2018-09-13 RX ADMIN — BACLOFEN 30 MG: 10 TABLET ORAL at 20:35

## 2018-09-13 RX ADMIN — METRONIDAZOLE 500 MG: 500 INJECTION, SOLUTION INTRAVENOUS at 10:03

## 2018-09-13 RX ADMIN — ATORVASTATIN CALCIUM 10 MG: 10 TABLET, FILM COATED ORAL at 20:35

## 2018-09-13 RX ADMIN — BACLOFEN 30 MG: 10 TABLET ORAL at 16:56

## 2018-09-13 RX ADMIN — ENOXAPARIN SODIUM 130 MG: 80 INJECTION SUBCUTANEOUS at 10:02

## 2018-09-13 RX ADMIN — METRONIDAZOLE 500 MG: 500 INJECTION, SOLUTION INTRAVENOUS at 02:28

## 2018-09-13 RX ADMIN — ALBUTEROL SULFATE 2.5 MG: 2.5 SOLUTION RESPIRATORY (INHALATION) at 09:22

## 2018-09-13 RX ADMIN — IPRATROPIUM BROMIDE AND ALBUTEROL SULFATE 3 ML: .5; 3 SOLUTION RESPIRATORY (INHALATION) at 19:13

## 2018-09-13 RX ADMIN — DULOXETINE HYDROCHLORIDE 60 MG: 60 CAPSULE, DELAYED RELEASE ORAL at 10:02

## 2018-09-13 RX ADMIN — APIXABAN 10 MG: 5 TABLET, FILM COATED ORAL at 20:35

## 2018-09-13 RX ADMIN — Medication 1 CAPSULE: at 10:02

## 2018-09-13 RX ADMIN — ARIPIPRAZOLE 10 MG: 10 TABLET ORAL at 20:35

## 2018-09-13 RX ADMIN — GABAPENTIN 800 MG: 400 CAPSULE ORAL at 14:26

## 2018-09-13 RX ADMIN — INSULIN DETEMIR 15 UNITS: 100 INJECTION, SOLUTION SUBCUTANEOUS at 10:07

## 2018-09-13 RX ADMIN — METHENAMINE HIPPURATE 1 G: 1000 TABLET ORAL at 10:01

## 2018-09-13 RX ADMIN — SUCRALFATE 1 G: 1 TABLET ORAL at 20:35

## 2018-09-13 RX ADMIN — BACLOFEN 30 MG: 10 TABLET ORAL at 10:02

## 2018-09-13 RX ADMIN — METRONIDAZOLE 500 MG: 500 INJECTION, SOLUTION INTRAVENOUS at 17:01

## 2018-09-13 RX ADMIN — INSULIN DETEMIR 15 UNITS: 100 INJECTION, SOLUTION SUBCUTANEOUS at 21:01

## 2018-09-13 RX ADMIN — POTASSIUM CHLORIDE 40 MEQ: 1500 TABLET, EXTENDED RELEASE ORAL at 10:02

## 2018-09-13 RX ADMIN — IPRATROPIUM BROMIDE AND ALBUTEROL SULFATE 3 ML: .5; 3 SOLUTION RESPIRATORY (INHALATION) at 13:40

## 2018-09-13 RX ADMIN — BACLOFEN 30 MG: 10 TABLET ORAL at 12:24

## 2018-09-13 RX ADMIN — ACETYLCYSTEINE 4 ML: 200 SOLUTION ORAL; RESPIRATORY (INHALATION) at 19:16

## 2018-09-13 RX ADMIN — INSULIN ASPART 2 UNITS: 100 INJECTION, SOLUTION INTRAVENOUS; SUBCUTANEOUS at 11:33

## 2018-09-13 RX ADMIN — ALBUTEROL SULFATE 2.5 MG: 2.5 SOLUTION RESPIRATORY (INHALATION) at 00:03

## 2018-09-13 NOTE — PLAN OF CARE
Problem: VTE, DVT and PE (Adult)  Goal: Signs and Symptoms of Listed Potential Problems Will be Absent, Minimized or Managed (VTE, DVT and PE)  Outcome: Ongoing (interventions implemented as appropriate)      Problem: Wound (Includes Pressure Injury) (Adult)  Goal: Signs and Symptoms of Listed Potential Problems Will be Absent, Minimized or Managed (Wound)  Outcome: Ongoing (interventions implemented as appropriate)      Problem: Patient Care Overview  Goal: Plan of Care Review  Outcome: Ongoing (interventions implemented as appropriate)    Goal: Individualization and Mutuality  Outcome: Ongoing (interventions implemented as appropriate)    Goal: Discharge Needs Assessment  Outcome: Ongoing (interventions implemented as appropriate)    Goal: Interprofessional Rounds/Family Conf  Outcome: Ongoing (interventions implemented as appropriate)      Problem: Diabetes, Type 2 (Adult)  Goal: Signs and Symptoms of Listed Potential Problems Will be Absent, Minimized or Managed (Diabetes, Type 2)  Outcome: Ongoing (interventions implemented as appropriate)

## 2018-09-13 NOTE — PROGRESS NOTES
Pharmacy was consulted for vancomycin dosing for SSTI.  Based on pt parameters and dosing history will initiate vancomycin at 2g iv q18hrs to target trough levels of 15-20 mg/L.  Pharmacy will continue to follow and obtain trough level once steady state is achieved.  Thank you.

## 2018-09-13 NOTE — NURSING NOTE
Spoke with Dr. Pack related to wound care for pressure injuries.  Patient will be discharged today and home health will  with [facility].

## 2018-09-13 NOTE — PROGRESS NOTES
Discharge Planning Assessment  MAKI Regalado     Patient Name: Ken Krueger  MRN: [id number]  Today's Date: 9/13/2018    Admit Date: 9/10/2018    Discharge Plan     Row Name 09/13/18 1016       Plan    Plan SS notified per Laurence at [facility] that pt has been denied admit to [facility] at this time due to being a blended pt.  SS will continue to follow and assist with discharge needs.         Sana Otero

## 2018-09-13 NOTE — PROGRESS NOTES
[facility] HOSPITALIST PROGRESS NOTE     Patient Identification:  Name:  Ken Krueger  Age:  42 y.o.  Sex:  male  :  1975  MRN:  [id number]  Visit Number:  [id number]  Primary Care Provider:  Provider, No Known    Length of stay:  3    Subjective:  Patient reports he feels much better, he is motor short of breath, infectious disease evaluation and recommendation noted and appreciated, hematology oncology recommendation noted and appreciated.  Ultrasound of the left upper extremity to check for DVT did confirm he has axillary vein deep venous thrombosis.  PICC line nurses informed and has been consulted she will remove the PICC line and will have power glide intravenous line on his right opportunity.  Patient has already seen wound specialist surgery service at [facility] and decided with the patient that no surgical flaps will be performed for his decubiti and will be managed with wound care and wound VAC.    Chief Complaint: Short of breath  ----------------------------------------------------------------------------------------------------------------------  Current Hospital Meds:    acetylcysteine 4 mL Inhalation BID - RT   ARIPiprazole 10 mg Oral Nightly   atorvastatin 10 mg Oral Nightly   baclofen 30 mg Oral 4x Daily With Meals & Nightly   DULoxetine 60 mg Oral Daily   enoxaparin 1 mg/kg Subcutaneous Q12H   gabapentin 800 mg Oral Q8H   insulin aspart 0-7 Units Subcutaneous 4x Daily AC & at Bedtime   insulin detemir 15 Units Subcutaneous BID   ipratropium-albuterol 3 mL Nebulization Q6H - RT   lactobacillus acidophilus 1 capsule Oral Daily   methenamine 1 g Oral BID   metroNIDAZOLE 500 mg Intravenous Q8H   modafinil 200 mg Oral Daily   pantoprazole 40 mg Oral Daily   potassium chloride 40 mEq Oral Daily   sucralfate 1 g Oral Nightly   vancomycin 2,000 mg Intravenous Q18H         ----------------------------------------------------------------------------------------------------------------------  Vital Signs:  Temp:  [97.7 °F (36.5 °C)-98.4 °F (36.9 °C)] 98 °F (36.7 °C)  Heart Rate:  [] 80  Resp:  [18-20] 18  BP: (112-137)/(69-85) 134/85  FiO2 (%):  [30 %] 30 %       Tele:   1    09/10/18  1611 09/10/18  2000 09/12/18  0610   Weight: 136 kg (300 lb) 127 kg (279 lb 1.6 oz) 128 kg (283 lb 1.6 oz)     Body mass index is 39.48 kg/m².    Intake/Output Summary (Last 24 hours) at 09/13/18 1227  Last data filed at 09/13/18 0922   Gross per 24 hour   Intake              360 ml   Output             1225 ml   Net             -865 ml     Diet Regular; Consistent Carbohydrate  ----------------------------------------------------------------------------------------------------------------------  Physical exam:  General: Comfortable,awake, alert, oriented to self, place, and time, well-developed and well-nourished.  No respiratory distress.    Skin:  Skin is warm and dry. No rash noted. No pallor.    HENT:  Head:  Normocephalic and atraumatic.  Mouth:  Moist mucous membranes.    Eyes:  Conjunctivae and EOM are normal.  Pupils are equal, round, and reactive to light.  No scleral icterus.    Neck:  Neck supple.  No JVD present.    Pulmonary/Chest:  No respiratory distress, no wheezes, no crackles, with normal breath sounds and good air movement.  Cardiovascular:  Normal rate, regular rhythm and normal heart sounds with no murmur.  Abdominal:  Soft.  Bowel sounds are normal.  No distension and no tenderness.   Extremities:  No edema, no tenderness, and no deformity except for a left above-knee amputation, both arms shows no symmetry or edema left arm PICC line noted, no redness or swelling.  No red or swollen joints anywhere.  Strong pulses in all 4 extremities with no clubbing, no cyanosis.  Neurological: Neurological:  Patient is paraplegic at the level just below the xiphoid process, he is able to  discern pressure but not to painful or tactile stimuli    Genitourinary: Cloudy urine on his urostomy bag  Back: He has 1 deep gluteal ulcers one on each side.      ----------------------------------------------------------------------------------------------------------------------  ----------------------------------------------------------------------------------------------------------------------    Results from last 7 days  Lab Units 09/12/18  0639 09/12/18 0105 09/11/18 2029 09/11/18  1124 09/11/18  0756 09/11/18  0055   CK TOTAL U/L  --   --  95  --  162 225*   CKMB ng/mL  --   --  1.53  --  2.26 2.57   CK MB INDEX %  --   --  1.6  --  1.4 1.1   TROPONIN I ng/mL 0.050* 0.056* 0.062*  --  0.092* 0.126*   MYOGLOBIN ng/mL  --   --  56.0 68.0 77.0 97.0       Results from last 7 days  Lab Units 09/13/18  0717 09/12/18 0105 09/11/18  0055 09/10/18  1702   WBC 10*3/mm3 7.73 11.05 10.40 12.34   HEMOGLOBIN g/dL 9.8* 10.1* 10.2* 12.7*   HEMATOCRIT % 32.9* 34.5* 34.8* 42.5   MCV fL 88.0 89.4 88.5 87.4   MCHC g/dL 29.8* 29.3* 29.3* 29.9*   PLATELETS 10*3/mm3 303 285 295 353   INR   --   --   --  1.12*       Results from last 7 days  Lab Units 09/11/18  0940   PH, ARTERIAL pH units 7.448   PO2 ART mm Hg 76.9*   PCO2, ARTERIAL mm Hg 30.8*   HCO3 ART mmol/L 20.8*       Results from last 7 days  Lab Units 09/13/18  0717 09/12/18  0105 09/11/18  0055 09/10/18  2113   SODIUM mmol/L 143 136 140  --    POTASSIUM mmol/L 4.6 3.4* 3.1*  --    MAGNESIUM mg/dL  --   --   --  1.6*   CHLORIDE mmol/L 112 107 108  --    CO2 mmol/L 22.5* 19.5* 23.7*  --    BUN mg/dL 22* 15 15  --    CREATININE mg/dL 1.06 1.03 1.00  --    EGFR IF NONAFRICN AM mL/min/1.73 77 80 83  --    CALCIUM mg/dL 8.8 8.6 8.4  --    GLUCOSE mg/dL 113* 129* 143*  --    ALBUMIN g/dL 3.70 3.60 3.50  --    BILIRUBIN mg/dL 0.2 0.4 0.3  --    ALK PHOS U/L 68 64 63  --    AST (SGOT) U/L 19 19 20  --    ALT (SGPT) U/L 26 24 26  --    Estimated Creatinine Clearance: 126.3  mL/min (by C-G formula based on SCr of 1.06 mg/dL).    No results found for: AMMONIA      Blood Culture   Date Value Ref Range Status   09/10/2018 No growth at 2 days  Preliminary   09/10/2018 No growth at 2 days  Preliminary                I have personally looked at the labs and they are summarized above.  ----------------------------------------------------------------------------------------------------------------------  Imaging Results (last 24 hours)     Procedure Component Value Units Date/Time    US Venous Doppler Upper Extremity Left (duplex) [[id number]] Collected:  09/13/18 0905     Updated:  09/13/18 0950    Narrative:       US VENOUS DOPPLER UPPER EXTREMITY LEFT (DUPLEX)-      CLINICAL INDICATION: DVT; I26.99-Other pulmonary embolism without acute  cor pulmonale          COMPARISON: None immediately available.     Color Doppler imaging with compression and augmentation to evaluate the  left upper extremity deep venous system shows internal clot and  noncompressibility in the left axillary vein. The remainder of the  venous system in the left upper extremity is normal.       Impression:       Study is positive for DVT in the left axillary vein.      This report was finalized on 9/13/2018 9:48 AM by Dr. Hernesto Alaniz MD.           ----------------------------------------------------------------------------------------------------------------------  Assessment and Plan:  - Acute extensive bilateral PE  - Acute left axillary vein deep venous thrombosis  - Chronic osteomyelitis of the sacrum  - Paraplegia  - Obstructive sleep apnea    Removed the left upper extremity PICC line, transition from Lovenox to oral anticoagulants specifically Eliquis.      Yovana Pack MD  09/13/18  12:27 PM

## 2018-09-13 NOTE — PROGRESS NOTES
"  I have personally seen and examined the patient today and discussed overnight interval progress and pertinent issues with nursing staff.    Subjective:    Patient resting comfortably in bed, no issues or complaints.  No fever or diarrhea reported overnight.    History taken from: patient chart RN      Vital Signs    /85   Pulse 80   Temp 98 °F (36.7 °C) (Oral)   Resp 18   Ht 180.3 cm (71\")   Wt 128 kg (283 lb 1.6 oz)   SpO2 98%   BMI 39.48 kg/m²     Temp:  [97.7 °F (36.5 °C)-98.4 °F (36.9 °C)] 98 °F (36.7 °C)      Intake/Output Summary (Last 24 hours) at 09/13/18 1348  Last data filed at 09/13/18 1300   Gross per 24 hour   Intake              720 ml   Output             1225 ml   Net             -505 ml     Intake & Output (last 3 days)       09/10 0701 - 09/11 0700 09/11 0701 - 09/12 0700 09/12 0701 - 09/13 0700 09/13 0701 - 09/14 0700    P.O.  710 354 720    IV Piggyback 500       Total Intake(mL/kg) 500 (3.9) 710 (5.5) 354 (2.8) 720 (5.6)    Urine (mL/kg/hr) 550 1350 (0.4) 875 (0.3) 350 (0.4)    Total Output 550 1350 875 350    Net -50 -640 -521 +370            Unmeasured Stool Occurrence   2 x           Physical exam:     General Appearance:    Alert, cooperative, in no acute distress   Head:    Normocephalic, without obvious abnormality, atraumatic   Eyes:            Lids and lashes normal, conjunctivae and sclerae normal, no   icterus, no pallor, corneas clear, PERRLA   Ears:    Ears appear intact with no abnormalities noted   Throat:   No oral lesions, no thrush, oral mucosa moist   Neck:   No adenopathy, supple, trachea midline, no thyromegaly, no   carotid bruit, no JVD   Back:     No tenderness to percussion or palpation, range of motion   normal   Lungs:     Clear to auscultation,respirations regular, even and     unlabored. No wheezing, no ronchi and no crackles.    Heart:    Regular rhythm and normal rate, normal S1 and S2, no    murmur, no gallop, no rub, no click   Chest Wall:    No " abnormalities observed   Abdomen:    Colostomy and ileostomy    Rectal:     Deferred   Extremities:   Paraplegia    Pulses:   Pulses palpable and equal bilaterally   Skin:  Left buttock ulcer deep with necrosis and yellow drainage with surrounding candidal rash.  Right but not deep wound well demarcated with no evidence of necrosis, odor or erythema    Lymph nodes:   No palpable adenopathy   Neurologic:   Awake, alert and oriented x 3.  Paraplegia        Results:      Results from last 7 days  Lab Units 09/13/18  0717 09/12/18  0105 09/11/18  0055 09/10/18  1702   WBC 10*3/mm3 7.73 11.05 10.40 12.34     Lab Results   Component Value Date    NEUTROABS 5.49 09/13/2018         Results from last 7 days  Lab Units 09/13/18  0717   CREATININE mg/dL 1.06             Imaging Results (last 24 hours)     Procedure Component Value Units Date/Time    US Venous Doppler Upper Extremity Left (duplex) [[id number]] Collected:  09/13/18 0905     Updated:  09/13/18 0950    Narrative:       US VENOUS DOPPLER UPPER EXTREMITY LEFT (DUPLEX)-      CLINICAL INDICATION: DVT; I26.99-Other pulmonary embolism without acute  cor pulmonale          COMPARISON: None immediately available.     Color Doppler imaging with compression and augmentation to evaluate the  left upper extremity deep venous system shows internal clot and  noncompressibility in the left axillary vein. The remainder of the  venous system in the left upper extremity is normal.       Impression:       Study is positive for DVT in the left axillary vein.      This report was finalized on 9/13/2018 9:48 AM by Dr. Hernesto Alaniz MD.               Results Review:    I have personally reviewed laboratory data, culture results, radiology studies and antimicrobial therapy.    Hospital Medications (active)       Dose Frequency Start End    acetylcysteine (MUCOMYST) 20 % solution 4 mL 4 mL 2 Times Daily - RT 9/13/2018     Sig - Route: Inhale 4 mL 2 (Two) Times a Day. - Inhalation    Cosign  "for Ordering: Accepted by Smith Delacruz MD on 9/13/2018  9:59 AM    apixaban (ELIQUIS) tablet 10 mg 10 mg Every 12 Hours Scheduled 9/13/2018 9/20/2018    Sig - Route: Take 2 tablets by mouth Every 12 (Twelve) Hours. - Oral    Linked Group 1:  \"Followed by\" Linked Group Details        apixaban (ELIQUIS) tablet 5 mg 5 mg Every 12 Hours Scheduled 9/20/2018     Sig - Route: Take 1 tablet by mouth Every 12 (Twelve) Hours. - Oral    Linked Group 1:  \"Followed by\" Linked Group Details        ARIPiprazole (ABILIFY) tablet 10 mg 10 mg Nightly 9/11/2018     Sig - Route: Take 1 tablet by mouth Every Night. - Oral    atorvastatin (LIPITOR) tablet 10 mg 10 mg Nightly 9/11/2018     Sig - Route: Take 1 tablet by mouth Every Night. - Oral    baclofen (LIORESAL) tablet 30 mg 30 mg 4 Times Daily With Meals & Nightly 9/11/2018     Sig - Route: Take 3 tablets by mouth 4 (Four) Times a Day With Meals & at Bedtime. - Oral    Notes to Pharmacy: Please order to be given at 12. Patient needing a dose per MD.    dextrose (D50W) 25 g/ 50mL Intravenous Solution 25 g 25 g Every 15 Minutes PRN 9/10/2018     Sig - Route: Infuse 50 mL into a venous catheter Every 15 (Fifteen) Minutes As Needed for Low Blood Sugar (Blood Sugar Less Than 70). - Intravenous    dextrose (GLUTOSE) oral gel 15 g 15 g Every 15 Minutes PRN 9/10/2018     Sig - Route: Take 15 g by mouth Every 15 (Fifteen) Minutes As Needed for Low Blood Sugar (Blood sugar less than 70). - Oral    DULoxetine (CYMBALTA) DR capsule 60 mg 60 mg Daily 9/11/2018     Sig - Route: Take 1 capsule by mouth Daily. - Oral    gabapentin (NEURONTIN) capsule 800 mg 800 mg Every 8 Hours Scheduled 9/11/2018     Sig - Route: Take 2 capsules by mouth Every 8 (Eight) Hours. - Oral    glucagon (human recombinant) (GLUCAGEN DIAGNOSTIC) injection 1 mg 1 mg As Needed 9/10/2018     Sig - Route: Inject 1 mg under the skin into the appropriate area as directed As Needed (Blood Glucose Less Than 70). - " "Subcutaneous    insulin aspart (novoLOG) injection 0-7 Units 0-7 Units 4 Times Daily Before Meals & Nightly 9/10/2018     Sig - Route: Inject 0-7 Units under the skin into the appropriate area as directed 4 (Four) Times a Day Before Meals & at Bedtime. - Subcutaneous    insulin detemir (LEVEMIR) injection 15 Units 15 Units 2 Times Daily 9/11/2018     Sig - Route: Inject 15 Units under the skin into the appropriate area as directed 2 (Two) Times a Day. - Subcutaneous    ipratropium-albuterol (DUO-NEB) nebulizer solution 3 mL 3 mL Every 6 Hours - RT 9/13/2018     Sig - Route: Take 3 mL by nebulization Every 6 (Six) Hours. - Nebulization    ketorolac (TORADOL) injection 30 mg 30 mg Every 6 Hours PRN 9/12/2018 9/17/2018    Sig - Route: Infuse 1 mL into a venous catheter Every 6 (Six) Hours As Needed for Severe Pain . - Intravenous    lactobacillus acidophilus (RISAQUAD) capsule 1 capsule 1 capsule Daily 9/12/2018     Sig - Route: Take 1 capsule by mouth Daily. - Oral    Magnesium Sulfate 2 gram infusion- Mg 1.6 - 1.9 mg/dL 2 g As Needed 9/10/2018     Sig - Route: Infuse 50 mL into a venous catheter As Needed (Mg 1.6 - 1.9 mg/dL). - Intravenous    Linked Group 2:  \"Or\" Linked Group Details        magnesium sulfate 3 gram infusion (1gm x 3) - Mg 1.1 - 1.5 mg/dL 1 g As Needed 9/10/2018     Sig - Route: Infuse 100 mL into a venous catheter As Needed (Mg 1.1 - 1.5 mg/dL). - Intravenous    Linked Group 2:  \"Or\" Linked Group Details        magnesium sulfate 4 gram infusion - Mg less than or equal to 1mg/dL 4 g As Needed 9/10/2018     Sig - Route: Infuse 100 mL into a venous catheter As Needed (Mg less than or equal to 1mg/dL). - Intravenous    Linked Group 2:  \"Or\" Linked Group Details        Menthol-Zinc Oxide  3 Times Daily PRN 9/11/2018     Sig - Route: Apply  topically to the appropriate area as directed 3 (Three) Times a Day As Needed (Skin irritation). - Topical    methenamine (HIPREX) tablet 1 g 1 g 2 Times Daily " "9/11/2018     Sig - Route: Take 1 tablet by mouth 2 (Two) Times a Day. - Oral    Non-formulary Exception Code: Patient supplied medication    metroNIDAZOLE (FLAGYL) IVPB 500 mg 500 mg Every 8 Hours 9/12/2018 9/22/2018    Sig - Route: Infuse 100 mL into a venous catheter Every 8 (Eight) Hours. - Intravenous    modafinil (PROVIGIL) tablet 200 mg 200 mg Daily 9/11/2018     Sig - Route: Take 2 tablets by mouth Daily. - Oral    nystatin (MYCOSTATIN) powder 1 application 1 application 3 Times Daily PRN 9/11/2018     Sig - Route: Apply 1 application topically to the appropriate area as directed 3 (Three) Times a Day As Needed (skin). - Topical    pantoprazole (PROTONIX) EC tablet 40 mg 40 mg Daily 9/11/2018     Sig - Route: Take 1 tablet by mouth Daily. - Oral    potassium chloride (K-DUR,KLOR-CON) CR tablet 40 mEq 40 mEq Daily 9/10/2018     Sig - Route: Take 2 tablets by mouth Daily. - Oral    potassium chloride (KLOR-CON) packet 40 mEq 40 mEq As Needed 9/10/2018     Sig - Route: Take 40 mEq by mouth As Needed (potassium replacement, see admin instructions). - Oral    Linked Group 3:  \"Or\" Linked Group Details        potassium chloride (MICRO-K) CR capsule 40 mEq 40 mEq As Needed 9/10/2018     Sig - Route: Take 4 capsules by mouth As Needed (potassium replacement.  see admin instructions). - Oral    Linked Group 3:  \"Or\" Linked Group Details        potassium chloride 10 mEq in 100 mL IVPB 10 mEq Every 1 Hour PRN 9/10/2018     Sig - Route: Infuse 100 mL into a venous catheter Every 1 (One) Hour As Needed (potassium protocol PERIPHERAL - see admin instructions). - Intravenous    Linked Group 3:  \"Or\" Linked Group Details        sodium chloride (NS) 0.9 % irrigation solution  - ADS Override Pull   9/12/2018 9/12/2018    Notes to Pharmacy: Created by cabinet override    sodium chloride 0.9 % flush 1-10 mL 1-10 mL As Needed 9/10/2018     Sig - Route: Infuse 1-10 mL into a venous catheter As Needed for Line Care. - Intravenous " "   sodium chloride 0.9 % flush 10 mL 10 mL As Needed 9/10/2018     Sig - Route: Infuse 10 mL into a venous catheter As Needed for Line Care. - Intravenous    Linked Group 4:  \"And\" Linked Group Details        sucralfate (CARAFATE) tablet 1 g 1 g Nightly 9/11/2018     Sig - Route: Take 1 tablet by mouth Every Night. - Oral    vancomycin (VANCOCIN) 2,000 mg in sodium chloride 0.9 % 500 mL IVPB 2,000 mg Once 9/12/2018 9/12/2018    Sig - Route: Infuse 2,000 mg into a venous catheter 1 (One) Time. - Intravenous    vancomycin (VANCOCIN) 2,000 mg in sodium chloride 0.9 % 500 mL IVPB 2,000 mg Every 18 Hours 9/13/2018 9/24/2018    Sig - Route: Infuse 2,000 mg into a venous catheter Every 18 (Eighteen) Hours. - Intravenous    albuterol (PROVENTIL) nebulizer solution 0.083% 2.5 mg/3mL (Discontinued) 2.5 mg Every 4 Hours PRN 9/11/2018 9/13/2018    Sig - Route: Take 2.5 mg by nebulization Every 4 (Four) Hours As Needed for Wheezing or Shortness of Air. - Nebulization    enoxaparin (LOVENOX) syringe 130 mg (Discontinued) 1 mg/kg × 127 kg Every 12 Hours 9/11/2018 9/13/2018    Sig - Route: Inject 1.3 mL under the skin into the appropriate area as directed Every 12 (Twelve) Hours. - Subcutaneous    ipratropium-albuterol (DUO-NEB) nebulizer solution 3 mL (Discontinued) 3 mL 4 Times Daily - RT 9/13/2018 9/13/2018    Sig - Route: Take 3 mL by nebulization 4 (Four) Times a Day. - Nebulization    Cosign for Ordering: Accepted by Smith Delacruz MD on 9/13/2018  9:59 AM    Pharmacy to dose vancomycin (Discontinued)  Continuous PRN 9/10/2018 9/13/2018    Sig - Route: Continuous As Needed for Consult. - Does not apply    Reason for Discontinue: *Re-Entry            Cultures:    Blood Culture   Date Value Ref Range Status   09/10/2018 No growth at 2 days  Preliminary   09/10/2018 No growth at 2 days  Preliminary           Assessment/Plan     ASSESSMENT:    1.  Chronic osteomyelitis    PLAN:    Patient resting comfortably in bed, no issues " or complaints.  No fever or diarrhea reported overnight.    42 y.o. male with past medical history significant for diabetes, hypertension, paraplegia, obesity, sleep apnea, and chronic osteomyelitis who presented to [facility] Emergency Department on 9/10/2018 for shortness of breath.      He does not seem to have any clinical evidence significant of sepsis with no fever, normal lactic acid and normal white count.      For now we recommend to continue with vancomycin based on previous cultures showing growth of streptococcus and enterobacter.      Patient is going to need very close surgical follow up as he will require further debridement and possible muscle flap.     Consider [facility] referral.    Current Antimicrobials:  Vancomycin pharmacy to dose  Flagyl 500mg IV Q8H      Patient's findings and recommendations were discussed with patient, nursing staff, primary care team and consulting provider    Code Status:   Code Status and Medical Interventions:   Ordered at: 09/10/18 0796     Level Of Support Discussed With:    Patient     Code Status:    CPR     Medical Interventions (Level of Support Prior to Arrest):    Full       GUANACO Flores  09/13/18  1:48 PM

## 2018-09-13 NOTE — PROGRESS NOTES
LOS: 3 days     Chief Complaint:  Pulmonology is following for pulmonary embolism    Subjective     Interval History:     Mr. Krueger was asleep upon entering the room and snoring. He awakened soon after.  He was tolerating nasal cannula.  He states that he did use the BiPAP at night.    History taken from: patient chart RN    Review of Systems:     Review of Systems - History obtained from chart review and the patient  General ROS: negative for - chills, fatigue or fever  Psychological ROS: negative for - anxiety or depression  ENT ROS: negative for - headaches, visual changes or vocal changes  Allergy and Immunology ROS: negative for - postnasal drip or seasonal allergies  Endocrine ROS: negative for - polydipsia/polyuria  Respiratory ROS: positive for - shortness of breath. Negative for - wheezing, cough  Cardiovascular ROS: negative for - chest pain or palpitations  Gastrointestinal ROS: negative for - abdominal pain or change in bowel habits  Musculoskeletal ROS: negative for - joint pain or joint swelling  Neurological ROS: no TIA or stroke symptoms                  Objective     Vital Signs  Temp:  [97.7 °F (36.5 °C)-98.4 °F (36.9 °C)] 98 °F (36.7 °C)  Heart Rate:  [] 89  Resp:  [18-20] 20  BP: (110-137)/(62-81) 121/81  FiO2 (%):  [30 %] 30 %  Body mass index is 39.48 kg/m².    Intake/Output Summary (Last 24 hours) at 09/13/18 0954  Last data filed at 09/13/18 0922   Gross per 24 hour   Intake              360 ml   Output             1225 ml   Net             -865 ml     I/O this shift:  In: 360 [P.O.:360]  Out: 350 [Urine:350]    Physical Exam:  GENERAL APPEARANCE: Well developed, well nourished, alert and cooperative, and appears to be in no acute distress. Tolerating nasal cannula.     HEAD: normocephalic. Atraumatic    EYES: PERRL. EOMI. Vision grossly intact.     NECK: Neck supple.    No thyromegaly  CARDIAC: Normal S1 and S2. No S3, S4 or murmurs. Rhythm is regular. There is no peripheral edema,  cyanosis or pallor. Extremities are warm and well perfused. Capillary refill is less than 2 seconds. No carotid bruits.    RESPIRATORY: Diminished breath sounds with bibasilar crackles noted at the lung bases. Diffuse rhonchi noted of the lungs bilaterally.   No wheezing upon auscultation.     GI: Positive bowel sounds. Soft, nondistended, nontender.     MUSCULOSKELTAL: No significant deformity or joint abnormality. No edema. Peripheral pulses intact.     NEUROLOGICAL: Strength and sensation symmetric and intact throughout.     PSYCHIATRIC: The mental examination revealed the patient was oriented to person, place, and time.                   Results Review:                I reviewed the patient's new clinical results.  I reviewed the patient's new imaging results and agree with the interpretation.    Results from last 7 days  Lab Units 09/13/18 0717 09/12/18 0105 09/11/18 0055   WBC 10*3/mm3 7.73 11.05 10.40   HEMOGLOBIN g/dL 9.8* 10.1* 10.2*   PLATELETS 10*3/mm3 303 285 295       Results from last 7 days  Lab Units 09/13/18  0717 09/12/18  0105 09/11/18  0055 09/10/18  2113   SODIUM mmol/L 143 136 140  --    POTASSIUM mmol/L 4.6 3.4* 3.1*  --    CHLORIDE mmol/L 112 107 108  --    CO2 mmol/L 22.5* 19.5* 23.7*  --    BUN mg/dL 22* 15 15  --    CREATININE mg/dL 1.06 1.03 1.00  --    CALCIUM mg/dL 8.8 8.6 8.4  --    GLUCOSE mg/dL 113* 129* 143*  --    MAGNESIUM mg/dL  --   --   --  1.6*     Lab Results   Component Value Date    INR 1.12 (H) 09/10/2018    INR 1.11 (H) 08/18/2018    INR 1.19 (H) 11/05/2017    PROTIME 14.6 09/10/2018    PROTIME 14.5 08/18/2018    PROTIME 15.3 11/05/2017       Results from last 7 days  Lab Units 09/13/18 0717 09/12/18 0105 09/11/18 0055   ALK PHOS U/L 68 64 63   BILIRUBIN mg/dL 0.2 0.4 0.3   ALT (SGPT) U/L 26 24 26   AST (SGOT) U/L 19 19 20       Results from last 7 days  Lab Units 09/11/18  0940   PH, ARTERIAL pH units 7.448   PO2 ART mm Hg 76.9*   PCO2, ARTERIAL mm Hg 30.8*   HCO3  ART mmol/L 20.8*     Imaging Results (last 24 hours)     Procedure Component Value Units Date/Time    US Venous Doppler Upper Extremity Left (duplex) [[id number]] Collected:  09/13/18 0905     Updated:  09/13/18 0950    Narrative:       US VENOUS DOPPLER UPPER EXTREMITY LEFT (DUPLEX)-      CLINICAL INDICATION: DVT; I26.99-Other pulmonary embolism without acute  cor pulmonale          COMPARISON: None immediately available.     Color Doppler imaging with compression and augmentation to evaluate the  left upper extremity deep venous system shows internal clot and  noncompressibility in the left axillary vein. The remainder of the  venous system in the left upper extremity is normal.       Impression:       Study is positive for DVT in the left axillary vein.      This report was finalized on 9/13/2018 9:48 AM by Dr. Hernesto Alaniz MD.                Medication Review:   Scheduled Medications:    ARIPiprazole 10 mg Oral Nightly   atorvastatin 10 mg Oral Nightly   baclofen 30 mg Oral 4x Daily With Meals & Nightly   DULoxetine 60 mg Oral Daily   enoxaparin 1 mg/kg Subcutaneous Q12H   gabapentin 800 mg Oral Q8H   insulin aspart 0-7 Units Subcutaneous 4x Daily AC & at Bedtime   insulin detemir 15 Units Subcutaneous BID   lactobacillus acidophilus 1 capsule Oral Daily   methenamine 1 g Oral BID   metroNIDAZOLE 500 mg Intravenous Q8H   modafinil 200 mg Oral Daily   pantoprazole 40 mg Oral Daily   potassium chloride 40 mEq Oral Daily   sucralfate 1 g Oral Nightly     Continuous infusions:    Pharmacy to dose vancomycin        Assessment/Plan    I have seen and examined the patient personally and performed a face-to-face diagnostic evaluation. The plan of care was reviewed and developed with APRN and nursing staff. I have addended and/or modified the above history of present illness, physical examination, and assessment/plan to reflect my findings and impressions. Essential elements of the care plan was discussed with APRN  above.  I agree with the findings and assessment/plan as documented below.    - No fevers reported overnight  - Hemodynamically stable  - On 2 L nasal cannula saturating 94%  - Input/Output was net -520 mL over the past 24 hours.   - Hemoglobin stable.  Platelets are stable.   - Patient is on Lovenox 130 mg subcutaneously every 12 hours  - Respiratory medications include albuterol PRN  - No previous smoking history noted.    Assessment  - Pulmonary embolism: Submassive bilateral, saddle  - Pulmonary hypertension  - Physical deconditioning  - Obesity hypoventilation syndrome  - Obstructive sleep apnea  - Lateral basal atelectasis  - Morbid obesity     Plan  - Continue with subcutaneous Lovenox 130 milligrams subcutaneously every 12 hours.  - Titrate FiO2 to keep SPO2 above 90%.  - Watch for cardiac arrhythmia.  - PTOT while in hospital  - Incentive spirometry to avoid basal atelectasis  - Recommend obtaining sleep study and pulmonary function test post discharge  - Started on duo nebs and Mucomyst for pulmonary toileting  - Started on BiPAP 15/8 with FiO2 30% while sleeping  - patient is high risk of developing chronic thromboembolic pulmonary hypertension.   - Discussed with the patient the importance of wearing the BiPAP machine not only at night, but anytime during sleep.  Discussed with him to notify the nursing staff to assist him with using the machine if he felt tired throughout the day.  Discussed negative effects of sleep apnea and hypoxia.  Patient conveyed understanding.  He stated that he did use the BiPAP at night and would try to use it during the day if he napped as well.              Patient Active Problem List   Diagnosis Code   • Infected decubitus ulcer L89.90, L08.9   • Decubitus skin ulcer L89.90   • Sepsis (CMS/[facility]) A41.9   • DM2 (diabetes mellitus, type 2) (CMS/[facility]) E11.9   • Osteomyelitis of pelvic region, acute (CMS/[facility]) M86.159   • Decubitus ulcer of right buttock L89.319   • Pulmonary  embolus (CMS/HCC) I26.99   • Bilateral pulmonary embolism (CMS/HCC) I26.99   • Chronic osteomyelitis (CMS/HCC) M86.60             Tiana Doe PA-C  09/13/18  9:54 AM  Plan was discussed with the patient/nurse/ respiratory therapist and the primary team.      Thank you for involving us in the care of the patient.    Smith Delacruz M.D  Pulmonary and Critical Care Medicine

## 2018-09-14 VITALS
BODY MASS INDEX: 39.63 KG/M2 | RESPIRATION RATE: 18 BRPM | HEART RATE: 83 BPM | SYSTOLIC BLOOD PRESSURE: 115 MMHG | DIASTOLIC BLOOD PRESSURE: 67 MMHG | HEIGHT: 71 IN | OXYGEN SATURATION: 94 % | WEIGHT: 283.1 LBS | TEMPERATURE: 97.7 F

## 2018-09-14 LAB
ALBUMIN SERPL-MCNC: 3.4 G/DL (ref 3.5–5)
ALBUMIN/GLOB SERPL: 0.9 G/DL (ref 1.5–2.5)
ALP SERPL-CCNC: 66 U/L (ref 40–129)
ALT SERPL W P-5'-P-CCNC: 19 U/L (ref 10–44)
ANION GAP SERPL CALCULATED.3IONS-SCNC: 5.7 MMOL/L (ref 3.6–11.2)
AST SERPL-CCNC: 14 U/L (ref 10–34)
BASOPHILS # BLD AUTO: 0.02 10*3/MM3 (ref 0–0.3)
BASOPHILS NFR BLD AUTO: 0.2 % (ref 0–2)
BILIRUB SERPL-MCNC: 0.2 MG/DL (ref 0.2–1.8)
BUN BLD-MCNC: 20 MG/DL (ref 7–21)
BUN/CREAT SERPL: 19.8 (ref 7–25)
CALCIUM SPEC-SCNC: 8.6 MG/DL (ref 7.7–10)
CHLORIDE SERPL-SCNC: 109 MMOL/L (ref 99–112)
CO2 SERPL-SCNC: 24.3 MMOL/L (ref 24.3–31.9)
CREAT BLD-MCNC: 1.01 MG/DL (ref 0.43–1.29)
CRP SERPL-MCNC: 5.74 MG/DL (ref 0–0.99)
DEPRECATED RDW RBC AUTO: 44.6 FL (ref 37–54)
EOSINOPHIL # BLD AUTO: 0.22 10*3/MM3 (ref 0–0.7)
EOSINOPHIL NFR BLD AUTO: 2.6 % (ref 0–5)
ERYTHROCYTE [DISTWIDTH] IN BLOOD BY AUTOMATED COUNT: 14.1 % (ref 11.5–14.5)
GFR SERPL CREATININE-BSD FRML MDRD: 82 ML/MIN/1.73
GLOBULIN UR ELPH-MCNC: 3.6 GM/DL
GLUCOSE BLD-MCNC: 175 MG/DL (ref 70–110)
GLUCOSE BLDC GLUCOMTR-MCNC: 123 MG/DL (ref 70–130)
GLUCOSE BLDC GLUCOMTR-MCNC: 132 MG/DL (ref 70–130)
HCT VFR BLD AUTO: 33 % (ref 42–52)
HGB BLD-MCNC: 9.6 G/DL (ref 14–18)
IMM GRANULOCYTES # BLD: 0.03 10*3/MM3 (ref 0–0.03)
IMM GRANULOCYTES NFR BLD: 0.4 % (ref 0–0.5)
LYMPHOCYTES # BLD AUTO: 1.79 10*3/MM3 (ref 1–3)
LYMPHOCYTES NFR BLD AUTO: 21.1 % (ref 21–51)
MAGNESIUM SERPL-MCNC: 1.4 MG/DL (ref 1.7–2.6)
MCH RBC QN AUTO: 25.8 PG (ref 27–33)
MCHC RBC AUTO-ENTMCNC: 29.1 G/DL (ref 33–37)
MCV RBC AUTO: 88.7 FL (ref 80–94)
MONOCYTES # BLD AUTO: 0.31 10*3/MM3 (ref 0.1–0.9)
MONOCYTES NFR BLD AUTO: 3.6 % (ref 0–10)
NEUTROPHILS # BLD AUTO: 6.13 10*3/MM3 (ref 1.4–6.5)
NEUTROPHILS NFR BLD AUTO: 72.1 % (ref 30–70)
OSMOLALITY SERPL CALC.SUM OF ELEC: 284.4 MOSM/KG (ref 273–305)
PLATELET # BLD AUTO: 301 10*3/MM3 (ref 130–400)
PMV BLD AUTO: 10.7 FL (ref 6–10)
POTASSIUM BLD-SCNC: 3.9 MMOL/L (ref 3.5–5.3)
PROT SERPL-MCNC: 7 G/DL (ref 6–8)
RBC # BLD AUTO: 3.72 10*6/MM3 (ref 4.7–6.1)
SODIUM BLD-SCNC: 139 MMOL/L (ref 135–153)
WBC NRBC COR # BLD: 8.5 10*3/MM3 (ref 4.5–12.5)

## 2018-09-14 PROCEDURE — 99232 SBSQ HOSP IP/OBS MODERATE 35: CPT | Performed by: INTERNAL MEDICINE

## 2018-09-14 PROCEDURE — 85025 COMPLETE CBC W/AUTO DIFF WBC: CPT | Performed by: HOSPITALIST

## 2018-09-14 PROCEDURE — C1751 CATH, INF, PER/CENT/MIDLINE: HCPCS

## 2018-09-14 PROCEDURE — 83735 ASSAY OF MAGNESIUM: CPT | Performed by: HOSPITALIST

## 2018-09-14 PROCEDURE — 94799 UNLISTED PULMONARY SVC/PX: CPT

## 2018-09-14 PROCEDURE — 25010000002 MAGNESIUM SULFATE IN D5W 1G/100ML (PREMIX) 1-5 GM/100ML-% SOLUTION: Performed by: HOSPITALIST

## 2018-09-14 PROCEDURE — 25010000002 VANCOMYCIN PER 500 MG

## 2018-09-14 PROCEDURE — 63710000001 INSULIN DETEMIR PER 5 UNITS: Performed by: HOSPITALIST

## 2018-09-14 PROCEDURE — 86140 C-REACTIVE PROTEIN: CPT | Performed by: NURSE PRACTITIONER

## 2018-09-14 PROCEDURE — 80053 COMPREHEN METABOLIC PANEL: CPT | Performed by: HOSPITALIST

## 2018-09-14 PROCEDURE — 82962 GLUCOSE BLOOD TEST: CPT

## 2018-09-14 PROCEDURE — 94660 CPAP INITIATION&MGMT: CPT

## 2018-09-14 PROCEDURE — 99239 HOSP IP/OBS DSCHRG MGMT >30: CPT | Performed by: HOSPITALIST

## 2018-09-14 RX ORDER — METRONIDAZOLE 250 MG/1
500 TABLET ORAL EVERY 8 HOURS SCHEDULED
Status: DISCONTINUED | OUTPATIENT
Start: 2018-09-14 | End: 2018-09-14 | Stop reason: HOSPADM

## 2018-09-14 RX ORDER — DOXYCYCLINE 100 MG/1
100 CAPSULE ORAL EVERY 12 HOURS SCHEDULED
Status: DISCONTINUED | OUTPATIENT
Start: 2018-09-14 | End: 2018-09-14 | Stop reason: HOSPADM

## 2018-09-14 RX ORDER — SODIUM CHLORIDE 0.9 % (FLUSH) 0.9 %
10 SYRINGE (ML) INJECTION AS NEEDED
Status: DISCONTINUED | OUTPATIENT
Start: 2018-09-14 | End: 2018-09-14 | Stop reason: HOSPADM

## 2018-09-14 RX ORDER — MAGNESIUM SULFATE 1 G/100ML
1 INJECTION INTRAVENOUS
Status: COMPLETED | OUTPATIENT
Start: 2018-09-14 | End: 2018-09-14

## 2018-09-14 RX ORDER — DOXYCYCLINE 100 MG/1
100 CAPSULE ORAL EVERY 12 HOURS SCHEDULED
Qty: 15 CAPSULE | Refills: 0 | Status: SHIPPED | OUTPATIENT
Start: 2018-09-14 | End: 2018-09-22

## 2018-09-14 RX ORDER — L.ACID,PARA/B.BIFIDUM/S.THERM 8B CELL
1 CAPSULE ORAL DAILY
Qty: 7 CAPSULE | Refills: 0 | Status: SHIPPED | OUTPATIENT
Start: 2018-09-14 | End: 2018-09-21

## 2018-09-14 RX ORDER — METRONIDAZOLE 500 MG/1
500 TABLET ORAL EVERY 8 HOURS SCHEDULED
Qty: 20 TABLET | Refills: 0 | Status: SHIPPED | OUTPATIENT
Start: 2018-09-14 | End: 2018-09-21

## 2018-09-14 RX ADMIN — MAGNESIUM SULFATE IN DEXTROSE 1 G: 10 INJECTION, SOLUTION INTRAVENOUS at 05:30

## 2018-09-14 RX ADMIN — INSULIN DETEMIR 15 UNITS: 100 INJECTION, SOLUTION SUBCUTANEOUS at 09:00

## 2018-09-14 RX ADMIN — PANTOPRAZOLE SODIUM 40 MG: 40 TABLET, DELAYED RELEASE ORAL at 07:49

## 2018-09-14 RX ADMIN — IPRATROPIUM BROMIDE AND ALBUTEROL SULFATE 3 ML: .5; 3 SOLUTION RESPIRATORY (INHALATION) at 07:16

## 2018-09-14 RX ADMIN — POTASSIUM CHLORIDE 40 MEQ: 1500 TABLET, EXTENDED RELEASE ORAL at 07:49

## 2018-09-14 RX ADMIN — ACETYLCYSTEINE 4 ML: 200 SOLUTION ORAL; RESPIRATORY (INHALATION) at 07:16

## 2018-09-14 RX ADMIN — BACLOFEN 30 MG: 10 TABLET ORAL at 11:59

## 2018-09-14 RX ADMIN — GABAPENTIN 800 MG: 400 CAPSULE ORAL at 06:28

## 2018-09-14 RX ADMIN — METRONIDAZOLE 500 MG: 500 INJECTION, SOLUTION INTRAVENOUS at 03:13

## 2018-09-14 RX ADMIN — GABAPENTIN 800 MG: 400 CAPSULE ORAL at 13:00

## 2018-09-14 RX ADMIN — APIXABAN 10 MG: 5 TABLET, FILM COATED ORAL at 07:48

## 2018-09-14 RX ADMIN — BACLOFEN 30 MG: 10 TABLET ORAL at 07:49

## 2018-09-14 RX ADMIN — DOXYCYCLINE 100 MG: 100 CAPSULE ORAL at 11:59

## 2018-09-14 RX ADMIN — DULOXETINE HYDROCHLORIDE 60 MG: 60 CAPSULE, DELAYED RELEASE ORAL at 07:48

## 2018-09-14 RX ADMIN — MODAFINIL 200 MG: 100 TABLET ORAL at 07:49

## 2018-09-14 RX ADMIN — Medication 1 CAPSULE: at 07:48

## 2018-09-14 RX ADMIN — METRONIDAZOLE 500 MG: 250 TABLET ORAL at 13:01

## 2018-09-14 RX ADMIN — METHENAMINE HIPPURATE 1 G: 1000 TABLET ORAL at 07:49

## 2018-09-14 RX ADMIN — MAGNESIUM SULFATE IN DEXTROSE 1 G: 10 INJECTION, SOLUTION INTRAVENOUS at 03:12

## 2018-09-14 RX ADMIN — IPRATROPIUM BROMIDE AND ALBUTEROL SULFATE 3 ML: .5; 3 SOLUTION RESPIRATORY (INHALATION) at 00:31

## 2018-09-14 RX ADMIN — VANCOMYCIN HYDROCHLORIDE 2000 MG: 5 INJECTION, POWDER, LYOPHILIZED, FOR SOLUTION INTRAVENOUS at 07:48

## 2018-09-14 RX ADMIN — MAGNESIUM SULFATE IN DEXTROSE 1 G: 10 INJECTION, SOLUTION INTRAVENOUS at 04:27

## 2018-09-14 RX ADMIN — IPRATROPIUM BROMIDE AND ALBUTEROL SULFATE 3 ML: .5; 3 SOLUTION RESPIRATORY (INHALATION) at 13:12

## 2018-09-14 NOTE — PHARMACY PATIENT ASSISTANCE
Pharmacy looked into pricing for new prescription for eliquis.  According to his insurance he should have a $0 copay.    Thank You;  Laisha Cabrera [facility]  09/14/18  11:41 AM

## 2018-09-14 NOTE — PROGRESS NOTES
Discharge Planning Assessment   Fabricio     Patient Name: Ken Krueger  MRN: [id number]  Today's Date: 9/14/2018    Admit Date: 9/10/2018         Discharge Plan     Row Name 09/14/18 1335       Plan    Final Discharge Disposition Code 06 - home with home health care    Final Note Pt to be discharged home on this date with continued [facility].  SS notified [facility] at [phone] of pt discharge.  RN to call report to [facility] .  SS left message for [facility] regarding pt discharge for pt follow up at home.  Pt stated he would be transported home via private auto per brother.       Sana Otero

## 2018-09-14 NOTE — PROGRESS NOTES
"  I have personally seen and examined the patient today and discussed overnight interval progress and pertinent issues with nursing staff.    Subjective:    Patient resting comfortably in bed, no issues or complaints.  No fever or diarrhea overnight.  WBC normal.  CRP 5.74.    History taken from: patient chart RN      Vital Signs    /67   Pulse 84   Temp 97.7 °F (36.5 °C) (Oral)   Resp 20   Ht 180.3 cm (71\")   Wt 128 kg (283 lb 1.6 oz)   SpO2 97%   BMI 39.48 kg/m²     Temp:  [97.5 °F (36.4 °C)-97.7 °F (36.5 °C)] 97.7 °F (36.5 °C)      Intake/Output Summary (Last 24 hours) at 09/14/18 1138  Last data filed at 09/14/18 0806   Gross per 24 hour   Intake              720 ml   Output             3225 ml   Net            -2505 ml     Intake & Output (last 3 days)       09/11 0701 - 09/12 0700 09/12 0701 - 09/13 0700 09/13 0701 - 09/14 0700 09/14 0701 - 09/15 0700    P.O. 710 354 720 360    Total Intake(mL/kg) 710 (5.5) 354 (2.8) 720 (5.6) 360 (2.8)    Urine (mL/kg/hr) 1350 (0.4) 875 (0.3) 2875 (0.9) 700 (1.2)    Total Output 4096 220 3882 700    Net -640 -521 -2155 -340            Unmeasured Stool Occurrence  2 x 2 x           Physical exam:     General Appearance:    Alert, cooperative, in no acute distress   Head:    Normocephalic, without obvious abnormality, atraumatic   Eyes:            Lids and lashes normal, conjunctivae and sclerae normal, no   icterus, no pallor, corneas clear, PERRLA   Ears:    Ears appear intact with no abnormalities noted   Throat:   No oral lesions, no thrush, oral mucosa moist   Neck:   No adenopathy, supple, trachea midline, no thyromegaly, no   carotid bruit, no JVD   Back:     No tenderness to percussion or palpation, range of motion   normal   Lungs:     Clear to auscultation,respirations regular, even and     unlabored. No wheezing, no ronchi and no crackles.    Heart:    Regular rhythm and normal rate, normal S1 and S2, no    murmur, no gallop, no rub, no click   Chest " "Wall:    No abnormalities observed   Abdomen:    Colostomy and ileostomy    Rectal:     Deferred   Extremities:   Paraplegia    Pulses:   Pulses palpable and equal bilaterally   Skin:  Left buttock ulcer deep with necrosis and yellow drainage with surrounding candidal rash.  Right but not deep wound well demarcated with no evidence of necrosis, odor or erythema    Lymph nodes:   No palpable adenopathy   Neurologic:   Awake, alert and oriented x 3.  Paraplegia        Results:      Results from last 7 days  Lab Units 09/14/18  0110 09/13/18  0717 09/12/18  0105 09/11/18  0055 09/10/18  1702   WBC 10*3/mm3 8.50 7.73 11.05 10.40 12.34     Lab Results   Component Value Date    NEUTROABS 6.13 09/14/2018         Results from last 7 days  Lab Units 09/14/18  0110   CREATININE mg/dL 1.01         Results from last 7 days  Lab Units 09/14/18  0110   CRP mg/dL 5.74*       Imaging Results (last 24 hours)     ** No results found for the last 24 hours. **            Results Review:    I have personally reviewed laboratory data, culture results, radiology studies and antimicrobial therapy.    Hospital Medications (active)       Dose Frequency Start End    acetylcysteine (MUCOMYST) 20 % solution 4 mL 4 mL 2 Times Daily - RT 9/13/2018     Sig - Route: Inhale 4 mL 2 (Two) Times a Day. - Inhalation    Cosign for Ordering: Accepted by Smith Delacruz MD on 9/13/2018  9:59 AM    apixaban (ELIQUIS) tablet 10 mg 10 mg Every 12 Hours Scheduled 9/13/2018 9/20/2018    Sig - Route: Take 2 tablets by mouth Every 12 (Twelve) Hours. - Oral    Linked Group 1:  \"Followed by\" Linked Group Details        apixaban (ELIQUIS) tablet 5 mg 5 mg Every 12 Hours Scheduled 9/20/2018     Sig - Route: Take 1 tablet by mouth Every 12 (Twelve) Hours. - Oral    Linked Group 1:  \"Followed by\" Linked Group Details        ARIPiprazole (ABILIFY) tablet 10 mg 10 mg Nightly 9/11/2018     Sig - Route: Take 1 tablet by mouth Every Night. - Oral    atorvastatin (LIPITOR) " tablet 10 mg 10 mg Nightly 9/11/2018     Sig - Route: Take 1 tablet by mouth Every Night. - Oral    baclofen (LIORESAL) tablet 30 mg 30 mg 4 Times Daily With Meals & Nightly 9/11/2018     Sig - Route: Take 3 tablets by mouth 4 (Four) Times a Day With Meals & at Bedtime. - Oral    Notes to Pharmacy: Please order to be given at 12. Patient needing a dose per MD.    dextrose (D50W) 25 g/ 50mL Intravenous Solution 25 g 25 g Every 15 Minutes PRN 9/10/2018     Sig - Route: Infuse 50 mL into a venous catheter Every 15 (Fifteen) Minutes As Needed for Low Blood Sugar (Blood Sugar Less Than 70). - Intravenous    dextrose (GLUTOSE) oral gel 15 g 15 g Every 15 Minutes PRN 9/10/2018     Sig - Route: Take 15 g by mouth Every 15 (Fifteen) Minutes As Needed for Low Blood Sugar (Blood sugar less than 70). - Oral    DULoxetine (CYMBALTA) DR capsule 60 mg 60 mg Daily 9/11/2018     Sig - Route: Take 1 capsule by mouth Daily. - Oral    gabapentin (NEURONTIN) capsule 800 mg 800 mg Every 8 Hours Scheduled 9/11/2018     Sig - Route: Take 2 capsules by mouth Every 8 (Eight) Hours. - Oral    glucagon (human recombinant) (GLUCAGEN DIAGNOSTIC) injection 1 mg 1 mg As Needed 9/10/2018     Sig - Route: Inject 1 mg under the skin into the appropriate area as directed As Needed (Blood Glucose Less Than 70). - Subcutaneous    insulin aspart (novoLOG) injection 0-7 Units 0-7 Units 4 Times Daily Before Meals & Nightly 9/10/2018     Sig - Route: Inject 0-7 Units under the skin into the appropriate area as directed 4 (Four) Times a Day Before Meals & at Bedtime. - Subcutaneous    insulin detemir (LEVEMIR) injection 15 Units 15 Units 2 Times Daily 9/11/2018     Sig - Route: Inject 15 Units under the skin into the appropriate area as directed 2 (Two) Times a Day. - Subcutaneous    ipratropium-albuterol (DUO-NEB) nebulizer solution 3 mL 3 mL Every 6 Hours - RT 9/13/2018     Sig - Route: Take 3 mL by nebulization Every 6 (Six) Hours. - Nebulization     "ketorolac (TORADOL) injection 30 mg 30 mg Every 6 Hours PRN 9/12/2018 9/17/2018    Sig - Route: Infuse 1 mL into a venous catheter Every 6 (Six) Hours As Needed for Severe Pain . - Intravenous    lactobacillus acidophilus (RISAQUAD) capsule 1 capsule 1 capsule Daily 9/12/2018     Sig - Route: Take 1 capsule by mouth Daily. - Oral    Magnesium Sulfate 2 gram infusion- Mg 1.6 - 1.9 mg/dL 2 g As Needed 9/10/2018     Sig - Route: Infuse 50 mL into a venous catheter As Needed (Mg 1.6 - 1.9 mg/dL). - Intravenous    Linked Group 2:  \"Or\" Linked Group Details        magnesium sulfate 3 gram infusion (1gm x 3) - Mg 1.1 - 1.5 mg/dL 1 g As Needed 9/10/2018     Sig - Route: Infuse 100 mL into a venous catheter As Needed (Mg 1.1 - 1.5 mg/dL). - Intravenous    Linked Group 2:  \"Or\" Linked Group Details        magnesium sulfate 4 gram infusion - Mg less than or equal to 1mg/dL 4 g As Needed 9/10/2018     Sig - Route: Infuse 100 mL into a venous catheter As Needed (Mg less than or equal to 1mg/dL). - Intravenous    Linked Group 2:  \"Or\" Linked Group Details        Menthol-Zinc Oxide  3 Times Daily PRN 9/11/2018     Sig - Route: Apply  topically to the appropriate area as directed 3 (Three) Times a Day As Needed (Skin irritation). - Topical    methenamine (HIPREX) tablet 1 g 1 g 2 Times Daily 9/11/2018     Sig - Route: Take 1 tablet by mouth 2 (Two) Times a Day. - Oral    Non-formulary Exception Code: Patient supplied medication    metroNIDAZOLE (FLAGYL) IVPB 500 mg 500 mg Every 8 Hours 9/12/2018 9/22/2018    Sig - Route: Infuse 100 mL into a venous catheter Every 8 (Eight) Hours. - Intravenous    modafinil (PROVIGIL) tablet 200 mg 200 mg Daily 9/11/2018     Sig - Route: Take 2 tablets by mouth Daily. - Oral    nystatin (MYCOSTATIN) powder 1 application 1 application 3 Times Daily PRN 9/11/2018     Sig - Route: Apply 1 application topically to the appropriate area as directed 3 (Three) Times a Day As Needed (skin). - Topical    " "pantoprazole (PROTONIX) EC tablet 40 mg 40 mg Daily 9/11/2018     Sig - Route: Take 1 tablet by mouth Daily. - Oral    potassium chloride (K-DUR,KLOR-CON) CR tablet 40 mEq 40 mEq Daily 9/10/2018     Sig - Route: Take 2 tablets by mouth Daily. - Oral    potassium chloride (KLOR-CON) packet 40 mEq 40 mEq As Needed 9/10/2018     Sig - Route: Take 40 mEq by mouth As Needed (potassium replacement, see admin instructions). - Oral    Linked Group 3:  \"Or\" Linked Group Details        potassium chloride (MICRO-K) CR capsule 40 mEq 40 mEq As Needed 9/10/2018     Sig - Route: Take 4 capsules by mouth As Needed (potassium replacement.  see admin instructions). - Oral    Linked Group 3:  \"Or\" Linked Group Details        potassium chloride 10 mEq in 100 mL IVPB 10 mEq Every 1 Hour PRN 9/10/2018     Sig - Route: Infuse 100 mL into a venous catheter Every 1 (One) Hour As Needed (potassium protocol PERIPHERAL - see admin instructions). - Intravenous    Linked Group 3:  \"Or\" Linked Group Details        sodium chloride (NS) 0.9 % irrigation solution  - ADS Override Pull   9/12/2018 9/12/2018    Notes to Pharmacy: Created by cabinet override    sodium chloride 0.9 % flush 1-10 mL 1-10 mL As Needed 9/10/2018     Sig - Route: Infuse 1-10 mL into a venous catheter As Needed for Line Care. - Intravenous    sodium chloride 0.9 % flush 10 mL 10 mL As Needed 9/10/2018     Sig - Route: Infuse 10 mL into a venous catheter As Needed for Line Care. - Intravenous    Linked Group 4:  \"And\" Linked Group Details        sucralfate (CARAFATE) tablet 1 g 1 g Nightly 9/11/2018     Sig - Route: Take 1 tablet by mouth Every Night. - Oral    vancomycin (VANCOCIN) 2,000 mg in sodium chloride 0.9 % 500 mL IVPB 2,000 mg Once 9/12/2018 9/12/2018    Sig - Route: Infuse 2,000 mg into a venous catheter 1 (One) Time. - Intravenous    vancomycin (VANCOCIN) 2,000 mg in sodium chloride 0.9 % 500 mL IVPB 2,000 mg Every 18 Hours 9/13/2018 9/24/2018    Sig - Route: " Infuse 2,000 mg into a venous catheter Every 18 (Eighteen) Hours. - Intravenous    albuterol (PROVENTIL) nebulizer solution 0.083% 2.5 mg/3mL (Discontinued) 2.5 mg Every 4 Hours PRN 9/11/2018 9/13/2018    Sig - Route: Take 2.5 mg by nebulization Every 4 (Four) Hours As Needed for Wheezing or Shortness of Air. - Nebulization    enoxaparin (LOVENOX) syringe 130 mg (Discontinued) 1 mg/kg × 127 kg Every 12 Hours 9/11/2018 9/13/2018    Sig - Route: Inject 1.3 mL under the skin into the appropriate area as directed Every 12 (Twelve) Hours. - Subcutaneous    ipratropium-albuterol (DUO-NEB) nebulizer solution 3 mL (Discontinued) 3 mL 4 Times Daily - RT 9/13/2018 9/13/2018    Sig - Route: Take 3 mL by nebulization 4 (Four) Times a Day. - Nebulization    Cosign for Ordering: Accepted by Smith Delacruz MD on 9/13/2018  9:59 AM    Pharmacy to dose vancomycin (Discontinued)  Continuous PRN 9/10/2018 9/13/2018    Sig - Route: Continuous As Needed for Consult. - Does not apply    Reason for Discontinue: *Re-Entry            Cultures:    Blood Culture   Date Value Ref Range Status   09/10/2018 No growth at 2 days  Preliminary   09/10/2018 No growth at 2 days  Preliminary           Assessment/Plan     ASSESSMENT:    1.  Chronic osteomyelitis    PLAN:    Patient resting comfortably in bed, no issues or complaints.  No fever or diarrhea overnight.  WBC normal.  CRP 5.74.    42 y.o. male with past medical history significant for diabetes, hypertension, paraplegia, obesity, sleep apnea, and chronic osteomyelitis who presented to [facility] Emergency Department on 9/10/2018 for shortness of breath.     In the setting of significant clinical and laboratory improvement, and absence of clinical sepsis with no surgical intervention planned, antibiotic coverage was de-escalated to Doxycycline 100mg PO Q12H and Flagyl 500mg PO TID to continue both through 9/21/18. Okay to discharge home from infectious disease  standpoint.      Current Antimicrobials:  Doxycycline 100mg PO Q12H through 9/21/18  Flagyl 500mg PO Q8H through 9/21/18      Patient's findings and recommendations were discussed with patient, nursing staff, primary care team and consulting provider    Code Status:   Code Status and Medical Interventions:   Ordered at: 09/10/18 1916     Level Of Support Discussed With:    Patient     Code Status:    CPR     Medical Interventions (Level of Support Prior to Arrest):    Full       GUANACO Flores  09/14/18  11:38 AM

## 2018-09-14 NOTE — PLAN OF CARE
Problem: Patient Care Overview  Goal: Plan of Care Review  Outcome: Ongoing (interventions implemented as appropriate)      Problem: VTE, DVT and PE (Adult)  Goal: Signs and Symptoms of Listed Potential Problems Will be Absent, Minimized or Managed (VTE, DVT and PE)  Outcome: Ongoing (interventions implemented as appropriate)      Problem: Skin Injury Risk (Adult)  Goal: Skin Health and Integrity  Outcome: Ongoing (interventions implemented as appropriate)

## 2018-09-14 NOTE — DISCHARGE PLACEMENT REQUEST
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 appropriate area as directed 2 (Two) Times a Day. - Subcutaneous    ipratropium-albuterol (DUO-NEB) nebulizer solution 3 mL 3 mL Every 6 Hours - RT 9/13/2018     Sig - Route: Take 3 mL by nebulization Every 6 (Six) Hours. - Nebulization    ketorolac (TORADOL) injection 30 mg 30 mg Every 6 Hours PRN 9/12/2018 9/17/2018    Sig - Route: Infuse 1 mL into a venous catheter Every 6 (Six) Hours As Needed for Severe Pain . - Intravenous    lactobacillus acidophilus (RISAQUAD) capsule 1 capsule 1 capsule Daily 9/12/2018     Sig - Route: Take 1 capsule by mouth Daily. - Oral    Magnesium Sulfate 2 gram infusion- Mg 1.6 - 1.9 mg/dL 2 g As Needed 9/10/2018     Sig - Route: Infuse 50 mL into a venous catheter As Needed (Mg 1.6 - 1.9 mg/dL). - Intravenous    Linked Group 2:  \"Or\" Linked Group Details        magnesium sulfate 3 gram infusion (1gm x 3) - Mg 1.1 - 1.5 mg/dL 1 g As Needed 9/10/2018     Sig - Route: Infuse 100 mL into a venous catheter As Needed (Mg 1.1 - 1.5 mg/dL). - Intravenous    Linked Group 2:  \"Or\" Linked Group Details        magnesium sulfate 3 gram infusion (1gm x 3) - Mg 1.1 - 1.5 mg/dL 1 g Every 1 Hour 9/14/2018 " "9/14/2018    Sig - Route: Infuse 100 mL into a venous catheter Every 1 (One) Hour. - Intravenous    magnesium sulfate 4 gram infusion - Mg less than or equal to 1mg/dL 4 g As Needed 9/10/2018     Sig - Route: Infuse 100 mL into a venous catheter As Needed (Mg less than or equal to 1mg/dL). - Intravenous    Linked Group 2:  \"Or\" Linked Group Details        Menthol-Zinc Oxide  3 Times Daily PRN 9/11/2018     Sig - Route: Apply  topically to the appropriate area as directed 3 (Three) Times a Day As Needed (Skin irritation). - Topical    methenamine (HIPREX) tablet 1 g 1 g 2 Times Daily 9/11/2018     Sig - Route: Take 1 tablet by mouth 2 (Two) Times a Day. - Oral    Non-formulary Exception Code: Patient supplied medication    metroNIDAZOLE (FLAGYL) tablet 500 mg 500 mg Every 8 Hours Scheduled 9/14/2018 9/21/2018    Sig - Route: Take 2 tablets by mouth Every 8 (Eight) Hours. - Oral    modafinil (PROVIGIL) tablet 200 mg 200 mg Daily 9/11/2018     Sig - Route: Take 2 tablets by mouth Daily. - Oral    nystatin (MYCOSTATIN) powder 1 application 1 application 3 Times Daily PRN 9/11/2018     Sig - Route: Apply 1 application topically to the appropriate area as directed 3 (Three) Times a Day As Needed (skin). - Topical    pantoprazole (PROTONIX) EC tablet 40 mg 40 mg Daily 9/11/2018     Sig - Route: Take 1 tablet by mouth Daily. - Oral    potassium chloride (K-DUR,KLOR-CON) CR tablet 40 mEq 40 mEq Daily 9/10/2018     Sig - Route: Take 2 tablets by mouth Daily. - Oral    potassium chloride (KLOR-CON) packet 40 mEq 40 mEq As Needed 9/10/2018     Sig - Route: Take 40 mEq by mouth As Needed (potassium replacement, see admin instructions). - Oral    Linked Group 3:  \"Or\" Linked Group Details        potassium chloride (MICRO-K) CR capsule 40 mEq 40 mEq As Needed 9/10/2018     Sig - Route: Take 4 capsules by mouth As Needed (potassium replacement.  see admin instructions). - Oral    Linked Group 3:  \"Or\" Linked Group Details        " "potassium chloride 10 mEq in 100 mL IVPB 10 mEq Every 1 Hour PRN 9/10/2018     Sig - Route: Infuse 100 mL into a venous catheter Every 1 (One) Hour As Needed (potassium protocol PERIPHERAL - see admin instructions). - Intravenous    Linked Group 3:  \"Or\" Linked Group Details        sodium chloride 0.9 % flush 1-10 mL 1-10 mL As Needed 9/10/2018     Sig - Route: Infuse 1-10 mL into a venous catheter As Needed for Line Care. - Intravenous    sodium chloride 0.9 % flush 10 mL 10 mL As Needed 9/10/2018     Sig - Route: Infuse 10 mL into a venous catheter As Needed for Line Care. - Intravenous    Linked Group 4:  \"And\" Linked Group Details        sodium chloride 0.9 % flush 10 mL 10 mL As Needed 9/14/2018     Sig - Route: Infuse 10 mL into a venous catheter As Needed for Line Care (Every 12 hours when not in use and after medication administration). - Intravenous    Cosign for Ordering: Accepted by Yovana Pack MD on 9/14/2018 10:36 AM    sucralfate (CARAFATE) tablet 1 g 1 g Nightly 9/11/2018     Sig - Route: Take 1 tablet by mouth Every Night. - Oral    metroNIDAZOLE (FLAGYL) IVPB 500 mg (Discontinued) 500 mg Every 8 Hours 9/12/2018 9/14/2018    Sig - Route: Infuse 100 mL into a venous catheter Every 8 (Eight) Hours. - Intravenous    vancomycin (VANCOCIN) 2,000 mg in sodium chloride 0.9 % 500 mL IVPB (Discontinued) 2,000 mg Every 18 Hours 9/13/2018 9/14/2018    Sig - Route: Infuse 2,000 mg into a venous catheter Every 18 (Eighteen) Hours. - Intravenous            Lab Results (last 24 hours)     Procedure Component Value Units Date/Time    POC Glucose Once [[id number]]  (Normal) Collected:  09/14/18 1201    Specimen:  Blood Updated:  09/14/18 1209     Glucose 123 mg/dL     Narrative:       Meter: [id number] : [id number] reyes barajas    POC Glucose Once [[id number]]  (Abnormal) Collected:  09/14/18 0706    Specimen:  Blood Updated:  09/14/18 0713     Glucose 132 (H) mg/dL     Narrative:       Meter: " [id number] : [id number] reyes barajas    Magnesium [[id number]]  (Abnormal) Collected:  09/14/18 0110    Specimen:  Blood Updated:  09/14/18 0204     Magnesium 1.4 (C) mg/dL     Osmolality, Calculated [[id number]]  (Normal) Collected:  09/14/18 0110    Specimen:  Blood Updated:  09/14/18 0201     Osmolality Calc 284.4 mOsm/kg     Comprehensive Metabolic Panel [[id number]]  (Abnormal) Collected:  09/14/18 0110    Specimen:  Blood Updated:  09/14/18 0201     Glucose 175 (H) mg/dL      BUN 20 mg/dL      Creatinine 1.01 mg/dL      Sodium 139 mmol/L      Potassium 3.9 mmol/L      Chloride 109 mmol/L      CO2 24.3 mmol/L      Calcium 8.6 mg/dL      Total Protein 7.0 g/dL      Albumin 3.40 (L) g/dL      ALT (SGPT) 19 U/L      AST (SGOT) 14 U/L      Alkaline Phosphatase 66 U/L      Comment: Note New Reference Ranges        Total Bilirubin 0.2 mg/dL      eGFR Non African Amer 82 mL/min/1.73      Globulin 3.6 gm/dL      A/G Ratio 0.9 (L) g/dL      BUN/Creatinine Ratio 19.8     Anion Gap 5.7 mmol/L     C-reactive Protein [[id number]]  (Abnormal) Collected:  09/14/18 0110    Specimen:  Blood Updated:  09/14/18 0200     C-Reactive Protein 5.74 (H) mg/dL     CBC & Differential [[id number]] Collected:  09/14/18 0110    Specimen:  Blood Updated:  09/14/18 0135    Narrative:       The following orders were created for panel order CBC & Differential.  Procedure                               Abnormality         Status                     ---------                               -----------         ------                     CBC Auto Differential[[id number]]        Abnormal            Final result                 Please view results for these tests on the individual orders.    CBC Auto Differential [[id number]]  (Abnormal) Collected:  09/14/18 0110    Specimen:  Blood Updated:  09/14/18 0135     WBC 8.50 10*3/mm3      RBC 3.72 (L) 10*6/mm3      Hemoglobin 9.6 (L) g/dL      Hematocrit 33.0 (L) %      MCV 88.7 fL      MCH 25.8 (L) pg       MCHC 29.1 (L) g/dL      RDW 14.1 %      RDW-SD 44.6 fl      MPV 10.7 (H) fL      Platelets 301 10*3/mm3      Neutrophil % 72.1 (H) %      Lymphocyte % 21.1 %      Monocyte % 3.6 %      Eosinophil % 2.6 %      Basophil % 0.2 %      Immature Grans % 0.4 %      Neutrophils, Absolute 6.13 10*3/mm3      Lymphocytes, Absolute 1.79 10*3/mm3      Monocytes, Absolute 0.31 10*3/mm3      Eosinophils, Absolute 0.22 10*3/mm3      Basophils, Absolute 0.02 10*3/mm3      Immature Grans, Absolute 0.03 10*3/mm3     Blood Culture - Blood, [[id number]]  (Normal) Collected:  09/10/18 2138    Specimen:  Blood from Arm, Left Updated:  09/13/18 2145     Blood Culture No growth at 3 days    Blood Culture - Blood, [[id number]]  (Normal) Collected:  09/10/18 2137    Specimen:  Blood from Hand, Left Updated:  09/13/18 2145     Blood Culture No growth at 3 days    POC Glucose Once [[id number]]  (Abnormal) Collected:  09/13/18 2038    Specimen:  Blood Updated:  09/13/18 2045     Glucose 138 (H) mg/dL     Narrative:       Meter: [id number] : [id number] cindy gasca    Magnesium [[id number]]  (Normal) Collected:  09/13/18 2003    Specimen:  Blood Updated:  09/13/18 2044     Magnesium 1.7 mg/dL     POC Glucose Once [[id number]]  (Normal) Collected:  09/13/18 1621    Specimen:  Blood Updated:  09/13/18 1626     Glucose 130 mg/dL     Narrative:       Meter: [id number] : [id number] reyes barajas        Orders (last 24 hrs)     Start     Ordered    09/21/18 0000  apixaban (ELIQUIS) 5 MG tablet tablet  Every 12 Hours Scheduled      09/14/18 1253    09/20/18 2100  apixaban (ELIQUIS) tablet 5 mg  Every 12 Hours Scheduled      09/13/18 1238    09/16/18 0000  Change CHG Dressing or Transparent Dressing With CHG Disk and Securement Device Every 7 Days  Weekly      09/14/18 1006    09/15/18 0600  C-reactive Protein  Morning Draw      09/14/18 1317    09/14/18 1400  metroNIDAZOLE (FLAGYL) tablet 500 mg  Every 8 Hours Scheduled      09/14/18 1033     09/14/18 1252  Discontinue IV  Once      09/14/18 1253    09/14/18 1251  Discharge patient  Once      09/14/18 1253    09/14/18 1210  POC Glucose Once  Once      09/14/18 1209    09/14/18 1130  doxycycline (MONODOX) capsule 100 mg  Every 12 Hours Scheduled      09/14/18 1033    09/14/18 1006  Connectors / Hubs Must Be Scrubbed 15 Seconds Using 70% Alcohol Before Access - Allow to Dry Before Accessing Line  Continuous      09/14/18 1006    09/14/18 1005  sodium chloride 0.9 % flush 10 mL  As Needed      09/14/18 1006    09/14/18 0714  POC Glucose Once  Once      09/14/18 0713    09/14/18 0600  C-reactive Protein  Morning Draw      09/13/18 1354    09/14/18 0600  CBC Auto Differential  PROCEDURE ONCE      09/14/18 0002    09/14/18 0600  Magnesium  Morning Draw      09/14/18 0008    09/14/18 0330  magnesium sulfate 3 gram infusion (1gm x 3) - Mg 1.1 - 1.5 mg/dL  Every 1 Hour      09/14/18 0241    09/14/18 0200  Osmolality, Calculated  Once      09/14/18 0159    09/14/18 0000  lactobacillus acidophilus (RISAQUAD) capsule capsule  Daily      09/14/18 1253    09/14/18 0000  apixaban (ELIQUIS) 5 MG tablet tablet  Every 12 Hours Scheduled      09/14/18 1253    09/14/18 0000  Referral to Home Health      09/14/18 1253    09/13/18 2046  POC Glucose Once  Once      09/13/18 2045    09/13/18 2000  Magnesium  Once      09/13/18 0618    09/13/18 2000  apixaban (ELIQUIS) tablet 10 mg  Every 12 Hours Scheduled      09/13/18 1238    09/13/18 1627  POC Glucose Once  Once      09/13/18 1626    09/13/18 1400  vancomycin (VANCOCIN) 2,000 mg in sodium chloride 0.9 % 500 mL IVPB  Every 18 Hours,   Status:  Discontinued      09/13/18 1126    09/13/18 1300  ipratropium-albuterol (DUO-NEB) nebulizer solution 3 mL  Every 6 Hours - RT      09/13/18 1159    09/13/18 1045  acetylcysteine (MUCOMYST) 20 % solution 4 mL  2 Times Daily - RT      09/13/18 0958    09/12/18 1200  lactobacillus acidophilus (RISAQUAD) capsule 1 capsule  Daily       09/12/18 1055    09/12/18 1000  metroNIDAZOLE (FLAGYL) IVPB 500 mg  Every 8 Hours,   Status:  Discontinued      09/12/18 0848    09/12/18 0049  ketorolac (TORADOL) injection 30 mg  Every 6 Hours PRN      09/12/18 0049    09/11/18 1800  Dietary Nutrition Supplements Thanh  Daily With Lunch & Dinner      09/11/18 1252    09/11/18 1300  DULoxetine (CYMBALTA) DR capsule 60 mg  Daily      09/11/18 1112    09/11/18 1300  gabapentin (NEURONTIN) capsule 800 mg  Every 8 Hours Scheduled      09/11/18 1112    09/11/18 1300  insulin detemir (LEVEMIR) injection 15 Units  2 Times Daily      09/11/18 1112    09/11/18 1300  modafinil (PROVIGIL) tablet 200 mg  Daily      09/11/18 1112    09/11/18 1300  pantoprazole (PROTONIX) EC tablet 40 mg  Daily      09/11/18 1112    09/11/18 1300  methenamine (HIPREX) tablet 1 g  2 Times Daily      09/11/18 1112    09/11/18 1200  baclofen (LIORESAL) tablet 30 mg  4 Times Daily With Meals & Nightly     Comments:  Please order to be given at 12. Patient needing a dose per MD.    09/11/18 1112    09/11/18 1101  ARIPiprazole (ABILIFY) tablet 10 mg  Nightly      09/11/18 1112    09/11/18 1101  atorvastatin (LIPITOR) tablet 10 mg  Nightly      09/11/18 1112    09/11/18 1101  Menthol-Zinc Oxide  3 Times Daily PRN      09/11/18 1112    09/11/18 1101  nystatin (MYCOSTATIN) powder 1 application  3 Times Daily PRN      09/11/18 1112    09/11/18 1101  sucralfate (CARAFATE) tablet 1 g  Nightly      09/11/18 1112    09/11/18 0600  CBC & Differential  Daily      09/11/18 0148    09/11/18 0600  Comprehensive Metabolic Panel  Daily      09/11/18 0148    09/10/18 2200  POC Glucose 4x Daily AC & at Bedtime  4 Times Daily Before Meals & at Bedtime      09/10/18 2046    09/10/18 2200  insulin aspart (novoLOG) injection 0-7 Units  4 Times Daily Before Meals & Nightly      09/10/18 2046    09/10/18 2156  magnesium sulfate 4 gram infusion - Mg less than or equal to 1mg/dL  As Needed      09/10/18 2156    09/10/18 2156   magnesium sulfate 3 gram infusion (1gm x 3) - Mg 1.1 - 1.5 mg/dL  As Needed      09/10/18 2156    09/10/18 2156  Magnesium Sulfate 2 gram infusion- Mg 1.6 - 1.9 mg/dL  As Needed      09/10/18 2156    09/10/18 2156  potassium chloride (MICRO-K) CR capsule 40 mEq  As Needed      09/10/18 2156    09/10/18 2156  potassium chloride (KLOR-CON) packet 40 mEq  As Needed      09/10/18 2156    09/10/18 2156  potassium chloride 10 mEq in 100 mL IVPB  Every 1 Hour PRN      09/10/18 2156    09/10/18 2046  dextrose (GLUTOSE) oral gel 15 g  Every 15 Minutes PRN      09/10/18 2046    09/10/18 2046  dextrose (D50W) 25 g/ 50mL Intravenous Solution 25 g  Every 15 Minutes PRN      09/10/18 2046    09/10/18 2046  glucagon (human recombinant) (GLUCAGEN DIAGNOSTIC) injection 1 mg  As Needed      09/10/18 2046    09/10/18 2046  sodium chloride 0.9 % flush 1-10 mL  As Needed      09/10/18 2046    09/10/18 1742  potassium chloride (K-DUR,KLOR-CON) CR tablet 40 mEq  Daily      09/10/18 1740    09/10/18 1643  sodium chloride 0.9 % flush 10 mL  As Needed      09/10/18 1643    Unscheduled  Magnesium  As Needed      09/10/18 2156    Unscheduled  Potassium  As Needed      09/10/18 2156    Unscheduled  Change Dressing to IV Site As Needed When Damp, Loose or Soiled  As Needed      09/11/18 0114    Unscheduled  Wound Care  As Needed      09/11/18 1103    Unscheduled  Change Dressing to IV Site As Needed When Damp, Loose or Soiled  As Needed      09/14/18 1006    Unscheduled  Change Needleless Connectors  As Needed     Comments:  Every 96 hours or with the changing of primary continuous administration set. Additionally, the needleless connector should be changed in the following circumstances: if the needleless connector is removed for any reason, if there is residual blood or debris within the needleless connector, prior to drawing a sample for blood culture, upon contamination of the needleless connector, and after administration of blood and/or  "blood components.    09/14/18 1006    --  vancomycin 2000 mg/500 mL 0.9% NS IVPB ([facility])  Every 12 Hours      09/10/18 2026    --  insulin glargine (LANTUS) 100 UNIT/ML injection  2 Times Daily      09/10/18 2026    --  modafinil (PROVIGIL) 200 MG tablet  Daily      09/10/18 2026    --  metFORMIN (GLUCOPHAGE) 1000 MG tablet  Daily      09/10/18 2119            Operative/Procedure Notes (last 24 hours) (Notes from 9/13/2018  1:23 PM through 9/14/2018  1:23 PM)     No notes of this type exist for this encounter.           Physician Progress Notes (last 24 hours) (Notes from 9/13/2018  1:23 PM through 9/14/2018  1:23 PM)      Verenice Deluca APRN at 9/14/2018 11:38 AM            I have personally seen and examined the patient today and discussed overnight interval progress and pertinent issues with nursing staff.    Subjective:    Patient resting comfortably in bed, no issues or complaints.  No fever or diarrhea overnight.  WBC normal.  CRP 5.74.    History taken from: patient chart RN      Vital Signs    /67   Pulse 84   Temp 97.7 °F (36.5 °C) (Oral)   Resp 20   Ht 180.3 cm (71\")   Wt 128 kg (283 lb 1.6 oz)   SpO2 97%   BMI 39.48 kg/m²      Temp:  [97.5 °F (36.4 °C)-97.7 °F (36.5 °C)] 97.7 °F (36.5 °C)      Intake/Output Summary (Last 24 hours) at 09/14/18 1138  Last data filed at 09/14/18 0806   Gross per 24 hour   Intake              720 ml   Output             3225 ml   Net            -2505 ml     Intake & Output (last 3 days)       09/11 0701 - 09/12 0700 09/12 0701 - 09/13 0700 09/13 0701 - 09/14 0700 09/14 0701 - 09/15 0700    P.O. 710 354 720 360    Total Intake(mL/kg) 710 (5.5) 354 (2.8) 720 (5.6) 360 (2.8)    Urine (mL/kg/hr) 1350 (0.4) 875 (0.3) 2875 (0.9) 700 (1.2)    Total Output 9207 880 3527 700    Net -640 -521 -2155 -340            Unmeasured Stool Occurrence  2 x 2 x           Physical exam:     General Appearance:    Alert, cooperative, in no acute distress   Head:    Normocephalic, " without obvious abnormality, atraumatic   Eyes:            Lids and lashes normal, conjunctivae and sclerae normal, no   icterus, no pallor, corneas clear, PERRLA   Ears:    Ears appear intact with no abnormalities noted   Throat:   No oral lesions, no thrush, oral mucosa moist   Neck:   No adenopathy, supple, trachea midline, no thyromegaly, no   carotid bruit, no JVD   Back:     No tenderness to percussion or palpation, range of motion   normal   Lungs:     Clear to auscultation,respirations regular, even and     unlabored. No wheezing, no ronchi and no crackles.    Heart:    Regular rhythm and normal rate, normal S1 and S2, no    murmur, no gallop, no rub, no click   Chest Wall:    No abnormalities observed   Abdomen:    Colostomy and ileostomy    Rectal:     Deferred   Extremities:   Paraplegia    Pulses:   Pulses palpable and equal bilaterally   Skin:  Left buttock ulcer deep with necrosis and yellow drainage with surrounding candidal rash.  Right but not deep wound well demarcated with no evidence of necrosis, odor or erythema    Lymph nodes:   No palpable adenopathy   Neurologic:   Awake, alert and oriented x 3.  Paraplegia        Results:      Results from last 7 days  Lab Units 09/14/18  0110 09/13/18  0717 09/12/18  0105 09/11/18  0055 09/10/18  1702   WBC 10*3/mm3 8.50 7.73 11.05 10.40 12.34     Lab Results   Component Value Date    NEUTROABS 6.13 09/14/2018         Results from last 7 days  Lab Units 09/14/18  0110   CREATININE mg/dL 1.01         Results from last 7 days  Lab Units 09/14/18  0110   CRP mg/dL 5.74*       Imaging Results (last 24 hours)     ** No results found for the last 24 hours. **            Results Review:    I have personally reviewed laboratory data, culture results, radiology studies and antimicrobial therapy.    Hospital Medications (active)       Dose Frequency Start End    acetylcysteine (MUCOMYST) 20 % solution 4 mL 4 mL 2 Times Daily - RT 9/13/2018     Sig - Route: Inhale 4  "mL 2 (Two) Times a Day. - Inhalation    Cosign for Ordering: Accepted by Smith Delacruz MD on 9/13/2018  9:59 AM    apixaban (ELIQUIS) tablet 10 mg 10 mg Every 12 Hours Scheduled 9/13/2018 9/20/2018    Sig - Route: Take 2 tablets by mouth Every 12 (Twelve) Hours. - Oral    Linked Group 1:  \"Followed by\" Linked Group Details        apixaban (ELIQUIS) tablet 5 mg 5 mg Every 12 Hours Scheduled 9/20/2018     Sig - Route: Take 1 tablet by mouth Every 12 (Twelve) Hours. - Oral    Linked Group 1:  \"Followed by\" Linked Group Details        ARIPiprazole (ABILIFY) tablet 10 mg 10 mg Nightly 9/11/2018     Sig - Route: Take 1 tablet by mouth Every Night. - Oral    atorvastatin (LIPITOR) tablet 10 mg 10 mg Nightly 9/11/2018     Sig - Route: Take 1 tablet by mouth Every Night. - Oral    baclofen (LIORESAL) tablet 30 mg 30 mg 4 Times Daily With Meals & Nightly 9/11/2018     Sig - Route: Take 3 tablets by mouth 4 (Four) Times a Day With Meals & at Bedtime. - Oral    Notes to Pharmacy: Please order to be given at 12. Patient needing a dose per MD.    dextrose (D50W) 25 g/ 50mL Intravenous Solution 25 g 25 g Every 15 Minutes PRN 9/10/2018     Sig - Route: Infuse 50 mL into a venous catheter Every 15 (Fifteen) Minutes As Needed for Low Blood Sugar (Blood Sugar Less Than 70). - Intravenous    dextrose (GLUTOSE) oral gel 15 g 15 g Every 15 Minutes PRN 9/10/2018     Sig - Route: Take 15 g by mouth Every 15 (Fifteen) Minutes As Needed for Low Blood Sugar (Blood sugar less than 70). - Oral    DULoxetine (CYMBALTA) DR capsule 60 mg 60 mg Daily 9/11/2018     Sig - Route: Take 1 capsule by mouth Daily. - Oral    gabapentin (NEURONTIN) capsule 800 mg 800 mg Every 8 Hours Scheduled 9/11/2018     Sig - Route: Take 2 capsules by mouth Every 8 (Eight) Hours. - Oral    glucagon (human recombinant) (GLUCAGEN DIAGNOSTIC) injection 1 mg 1 mg As Needed 9/10/2018     Sig - Route: Inject 1 mg under the skin into the appropriate area as directed As " "Needed (Blood Glucose Less Than 70). - Subcutaneous    insulin aspart (novoLOG) injection 0-7 Units 0-7 Units 4 Times Daily Before Meals & Nightly 9/10/2018     Sig - Route: Inject 0-7 Units under the skin into the appropriate area as directed 4 (Four) Times a Day Before Meals & at Bedtime. - Subcutaneous    insulin detemir (LEVEMIR) injection 15 Units 15 Units 2 Times Daily 9/11/2018     Sig - Route: Inject 15 Units under the skin into the appropriate area as directed 2 (Two) Times a Day. - Subcutaneous    ipratropium-albuterol (DUO-NEB) nebulizer solution 3 mL 3 mL Every 6 Hours - RT 9/13/2018     Sig - Route: Take 3 mL by nebulization Every 6 (Six) Hours. - Nebulization    ketorolac (TORADOL) injection 30 mg 30 mg Every 6 Hours PRN 9/12/2018 9/17/2018    Sig - Route: Infuse 1 mL into a venous catheter Every 6 (Six) Hours As Needed for Severe Pain . - Intravenous    lactobacillus acidophilus (RISAQUAD) capsule 1 capsule 1 capsule Daily 9/12/2018     Sig - Route: Take 1 capsule by mouth Daily. - Oral    Magnesium Sulfate 2 gram infusion- Mg 1.6 - 1.9 mg/dL 2 g As Needed 9/10/2018     Sig - Route: Infuse 50 mL into a venous catheter As Needed (Mg 1.6 - 1.9 mg/dL). - Intravenous    Linked Group 2:  \"Or\" Linked Group Details        magnesium sulfate 3 gram infusion (1gm x 3) - Mg 1.1 - 1.5 mg/dL 1 g As Needed 9/10/2018     Sig - Route: Infuse 100 mL into a venous catheter As Needed (Mg 1.1 - 1.5 mg/dL). - Intravenous    Linked Group 2:  \"Or\" Linked Group Details        magnesium sulfate 4 gram infusion - Mg less than or equal to 1mg/dL 4 g As Needed 9/10/2018     Sig - Route: Infuse 100 mL into a venous catheter As Needed (Mg less than or equal to 1mg/dL). - Intravenous    Linked Group 2:  \"Or\" Linked Group Details        Menthol-Zinc Oxide  3 Times Daily PRN 9/11/2018     Sig - Route: Apply  topically to the appropriate area as directed 3 (Three) Times a Day As Needed (Skin irritation). - Topical    methenamine " "(HIPREX) tablet 1 g 1 g 2 Times Daily 9/11/2018     Sig - Route: Take 1 tablet by mouth 2 (Two) Times a Day. - Oral    Non-formulary Exception Code: Patient supplied medication    metroNIDAZOLE (FLAGYL) IVPB 500 mg 500 mg Every 8 Hours 9/12/2018 9/22/2018    Sig - Route: Infuse 100 mL into a venous catheter Every 8 (Eight) Hours. - Intravenous    modafinil (PROVIGIL) tablet 200 mg 200 mg Daily 9/11/2018     Sig - Route: Take 2 tablets by mouth Daily. - Oral    nystatin (MYCOSTATIN) powder 1 application 1 application 3 Times Daily PRN 9/11/2018     Sig - Route: Apply 1 application topically to the appropriate area as directed 3 (Three) Times a Day As Needed (skin). - Topical    pantoprazole (PROTONIX) EC tablet 40 mg 40 mg Daily 9/11/2018     Sig - Route: Take 1 tablet by mouth Daily. - Oral    potassium chloride (K-DUR,KLOR-CON) CR tablet 40 mEq 40 mEq Daily 9/10/2018     Sig - Route: Take 2 tablets by mouth Daily. - Oral    potassium chloride (KLOR-CON) packet 40 mEq 40 mEq As Needed 9/10/2018     Sig - Route: Take 40 mEq by mouth As Needed (potassium replacement, see admin instructions). - Oral    Linked Group 3:  \"Or\" Linked Group Details        potassium chloride (MICRO-K) CR capsule 40 mEq 40 mEq As Needed 9/10/2018     Sig - Route: Take 4 capsules by mouth As Needed (potassium replacement.  see admin instructions). - Oral    Linked Group 3:  \"Or\" Linked Group Details        potassium chloride 10 mEq in 100 mL IVPB 10 mEq Every 1 Hour PRN 9/10/2018     Sig - Route: Infuse 100 mL into a venous catheter Every 1 (One) Hour As Needed (potassium protocol PERIPHERAL - see admin instructions). - Intravenous    Linked Group 3:  \"Or\" Linked Group Details        sodium chloride (NS) 0.9 % irrigation solution  - ADS Override Pull   9/12/2018 9/12/2018    Notes to Pharmacy: Created by cabinet override    sodium chloride 0.9 % flush 1-10 mL 1-10 mL As Needed 9/10/2018     Sig - Route: Infuse 1-10 mL into a venous catheter " "As Needed for Line Care. - Intravenous    sodium chloride 0.9 % flush 10 mL 10 mL As Needed 9/10/2018     Sig - Route: Infuse 10 mL into a venous catheter As Needed for Line Care. - Intravenous    Linked Group 4:  \"And\" Linked Group Details        sucralfate (CARAFATE) tablet 1 g 1 g Nightly 9/11/2018     Sig - Route: Take 1 tablet by mouth Every Night. - Oral    vancomycin (VANCOCIN) 2,000 mg in sodium chloride 0.9 % 500 mL IVPB 2,000 mg Once 9/12/2018 9/12/2018    Sig - Route: Infuse 2,000 mg into a venous catheter 1 (One) Time. - Intravenous    vancomycin (VANCOCIN) 2,000 mg in sodium chloride 0.9 % 500 mL IVPB 2,000 mg Every 18 Hours 9/13/2018 9/24/2018    Sig - Route: Infuse 2,000 mg into a venous catheter Every 18 (Eighteen) Hours. - Intravenous    albuterol (PROVENTIL) nebulizer solution 0.083% 2.5 mg/3mL (Discontinued) 2.5 mg Every 4 Hours PRN 9/11/2018 9/13/2018    Sig - Route: Take 2.5 mg by nebulization Every 4 (Four) Hours As Needed for Wheezing or Shortness of Air. - Nebulization    enoxaparin (LOVENOX) syringe 130 mg (Discontinued) 1 mg/kg × 127 kg Every 12 Hours 9/11/2018 9/13/2018    Sig - Route: Inject 1.3 mL under the skin into the appropriate area as directed Every 12 (Twelve) Hours. - Subcutaneous    ipratropium-albuterol (DUO-NEB) nebulizer solution 3 mL (Discontinued) 3 mL 4 Times Daily - RT 9/13/2018 9/13/2018    Sig - Route: Take 3 mL by nebulization 4 (Four) Times a Day. - Nebulization    Cosign for Ordering: Accepted by Smith Delacruz MD on 9/13/2018  9:59 AM    Pharmacy to dose vancomycin (Discontinued)  Continuous PRN 9/10/2018 9/13/2018    Sig - Route: Continuous As Needed for Consult. - Does not apply    Reason for Discontinue: *Re-Entry            Cultures:    Blood Culture   Date Value Ref Range Status   09/10/2018 No growth at 2 days  Preliminary   09/10/2018 No growth at 2 days  Preliminary           Assessment/Plan     ASSESSMENT:    1.  Chronic osteomyelitis    PLAN:    Patient " "resting comfortably in bed, no issues or complaints.  No fever or diarrhea overnight.  WBC normal.  CRP 5.74.    42 y.o. male with past medical history significant for diabetes, hypertension, paraplegia, obesity, sleep apnea, and chronic osteomyelitis who presented to [facility] Emergency Department on 9/10/2018 for shortness of breath.     In the setting of significant clinical and laboratory improvement, and absence of clinical sepsis with no surgical intervention planned, antibiotic coverage was de-escalated to Doxycycline 100mg PO Q12H and Flagyl 500mg PO TID to continue both through 9/21/18. Okay to discharge home from infectious disease standpoint.      Current Antimicrobials:  Doxycycline 100mg PO Q12H through 9/21/18  Flagyl 500mg PO Q8H through 9/21/18      Patient's findings and recommendations were discussed with patient, nursing staff, primary care team and consulting provider    Code Status:   Code Status and Medical Interventions:   Ordered at: 09/10/18 1916     Level Of Support Discussed With:    Patient     Code Status:    CPR     Medical Interventions (Level of Support Prior to Arrest):    Full       GUANACO Flores  09/14/18  11:38 AM    Electronically signed by Verenice Deluca APRN at 9/14/2018 11:44 AM     Verenice Deluca APRN at 9/13/2018  1:48 PM            I have personally seen and examined the patient today and discussed overnight interval progress and pertinent issues with nursing staff.    Subjective:    Patient resting comfortably in bed, no issues or complaints.  No fever or diarrhea reported overnight.    History taken from: patient chart RN      Vital Signs    /85   Pulse 80   Temp 98 °F (36.7 °C) (Oral)   Resp 18   Ht 180.3 cm (71\")   Wt 128 kg (283 lb 1.6 oz)   SpO2 98%   BMI 39.48 kg/m²      Temp:  [97.7 °F (36.5 °C)-98.4 °F (36.9 °C)] 98 °F (36.7 °C)      Intake/Output Summary (Last 24 hours) at 09/13/18 1348  Last data filed at 09/13/18 1300   Gross per " 24 hour   Intake              720 ml   Output             1225 ml   Net             -505 ml     Intake & Output (last 3 days)       09/10 0701 - 09/11 0700 09/11 0701 - 09/12 0700 09/12 0701 - 09/13 0700 09/13 0701 - 09/14 0700    P.O.  710 354 720    IV Piggyback 500       Total Intake(mL/kg) 500 (3.9) 710 (5.5) 354 (2.8) 720 (5.6)    Urine (mL/kg/hr) 550 1350 (0.4) 875 (0.3) 350 (0.4)    Total Output 550 1350 875 350    Net -50 -640 -521 +370            Unmeasured Stool Occurrence   2 x           Physical exam:     General Appearance:    Alert, cooperative, in no acute distress   Head:    Normocephalic, without obvious abnormality, atraumatic   Eyes:            Lids and lashes normal, conjunctivae and sclerae normal, no   icterus, no pallor, corneas clear, PERRLA   Ears:    Ears appear intact with no abnormalities noted   Throat:   No oral lesions, no thrush, oral mucosa moist   Neck:   No adenopathy, supple, trachea midline, no thyromegaly, no   carotid bruit, no JVD   Back:     No tenderness to percussion or palpation, range of motion   normal   Lungs:     Clear to auscultation,respirations regular, even and     unlabored. No wheezing, no ronchi and no crackles.    Heart:    Regular rhythm and normal rate, normal S1 and S2, no    murmur, no gallop, no rub, no click   Chest Wall:    No abnormalities observed   Abdomen:    Colostomy and ileostomy    Rectal:     Deferred   Extremities:   Paraplegia    Pulses:   Pulses palpable and equal bilaterally   Skin:  Left buttock ulcer deep with necrosis and yellow drainage with surrounding candidal rash.  Right but not deep wound well demarcated with no evidence of necrosis, odor or erythema    Lymph nodes:   No palpable adenopathy   Neurologic:   Awake, alert and oriented x 3.  Paraplegia         Results:      Results from last 7 days  Lab Units 09/13/18  0717 09/12/18  0105 09/11/18  0055 09/10/18  1702   WBC 10*3/mm3 7.73 11.05 10.40 12.34     Lab Results   Component  "Value Date    NEUTROABS 5.49 09/13/2018         Results from last 7 days  Lab Units 09/13/18  0717   CREATININE mg/dL 1.06             Imaging Results (last 24 hours)     Procedure Component Value Units Date/Time    US Venous Doppler Upper Extremity Left (duplex) [[id number]] Collected:  09/13/18 0905     Updated:  09/13/18 0950    Narrative:       US VENOUS DOPPLER UPPER EXTREMITY LEFT (DUPLEX)-      CLINICAL INDICATION: DVT; I26.99-Other pulmonary embolism without acute  cor pulmonale          COMPARISON: None immediately available.     Color Doppler imaging with compression and augmentation to evaluate the  left upper extremity deep venous system shows internal clot and  noncompressibility in the left axillary vein. The remainder of the  venous system in the left upper extremity is normal.       Impression:       Study is positive for DVT in the left axillary vein.      This report was finalized on 9/13/2018 9:48 AM by Dr. Hernesto Alaniz MD.               Results Review:    I have personally reviewed laboratory data, culture results, radiology studies and antimicrobial therapy.    Hospital Medications (active)       Dose Frequency Start End    acetylcysteine (MUCOMYST) 20 % solution 4 mL 4 mL 2 Times Daily - RT 9/13/2018     Sig - Route: Inhale 4 mL 2 (Two) Times a Day. - Inhalation    Cosign for Ordering: Accepted by Smith Delacruz MD on 9/13/2018  9:59 AM    apixaban (ELIQUIS) tablet 10 mg 10 mg Every 12 Hours Scheduled 9/13/2018 9/20/2018    Sig - Route: Take 2 tablets by mouth Every 12 (Twelve) Hours. - Oral    Linked Group 1:  \"Followed by\" Linked Group Details        apixaban (ELIQUIS) tablet 5 mg 5 mg Every 12 Hours Scheduled 9/20/2018     Sig - Route: Take 1 tablet by mouth Every 12 (Twelve) Hours. - Oral    Linked Group 1:  \"Followed by\" Linked Group Details        ARIPiprazole (ABILIFY) tablet 10 mg 10 mg Nightly 9/11/2018     Sig - Route: Take 1 tablet by mouth Every Night. - Oral    atorvastatin " (LIPITOR) tablet 10 mg 10 mg Nightly 9/11/2018     Sig - Route: Take 1 tablet by mouth Every Night. - Oral    baclofen (LIORESAL) tablet 30 mg 30 mg 4 Times Daily With Meals & Nightly 9/11/2018     Sig - Route: Take 3 tablets by mouth 4 (Four) Times a Day With Meals & at Bedtime. - Oral    Notes to Pharmacy: Please order to be given at 12. Patient needing a dose per MD.    dextrose (D50W) 25 g/ 50mL Intravenous Solution 25 g 25 g Every 15 Minutes PRN 9/10/2018     Sig - Route: Infuse 50 mL into a venous catheter Every 15 (Fifteen) Minutes As Needed for Low Blood Sugar (Blood Sugar Less Than 70). - Intravenous    dextrose (GLUTOSE) oral gel 15 g 15 g Every 15 Minutes PRN 9/10/2018     Sig - Route: Take 15 g by mouth Every 15 (Fifteen) Minutes As Needed for Low Blood Sugar (Blood sugar less than 70). - Oral    DULoxetine (CYMBALTA) DR capsule 60 mg 60 mg Daily 9/11/2018     Sig - Route: Take 1 capsule by mouth Daily. - Oral    gabapentin (NEURONTIN) capsule 800 mg 800 mg Every 8 Hours Scheduled 9/11/2018     Sig - Route: Take 2 capsules by mouth Every 8 (Eight) Hours. - Oral    glucagon (human recombinant) (GLUCAGEN DIAGNOSTIC) injection 1 mg 1 mg As Needed 9/10/2018     Sig - Route: Inject 1 mg under the skin into the appropriate area as directed As Needed (Blood Glucose Less Than 70). - Subcutaneous    insulin aspart (novoLOG) injection 0-7 Units 0-7 Units 4 Times Daily Before Meals & Nightly 9/10/2018     Sig - Route: Inject 0-7 Units under the skin into the appropriate area as directed 4 (Four) Times a Day Before Meals & at Bedtime. - Subcutaneous    insulin detemir (LEVEMIR) injection 15 Units 15 Units 2 Times Daily 9/11/2018     Sig - Route: Inject 15 Units under the skin into the appropriate area as directed 2 (Two) Times a Day. - Subcutaneous    ipratropium-albuterol (DUO-NEB) nebulizer solution 3 mL 3 mL Every 6 Hours - RT 9/13/2018     Sig - Route: Take 3 mL by nebulization Every 6 (Six) Hours. -  "Nebulization    ketorolac (TORADOL) injection 30 mg 30 mg Every 6 Hours PRN 9/12/2018 9/17/2018    Sig - Route: Infuse 1 mL into a venous catheter Every 6 (Six) Hours As Needed for Severe Pain . - Intravenous    lactobacillus acidophilus (RISAQUAD) capsule 1 capsule 1 capsule Daily 9/12/2018     Sig - Route: Take 1 capsule by mouth Daily. - Oral    Magnesium Sulfate 2 gram infusion- Mg 1.6 - 1.9 mg/dL 2 g As Needed 9/10/2018     Sig - Route: Infuse 50 mL into a venous catheter As Needed (Mg 1.6 - 1.9 mg/dL). - Intravenous    Linked Group 2:  \"Or\" Linked Group Details        magnesium sulfate 3 gram infusion (1gm x 3) - Mg 1.1 - 1.5 mg/dL 1 g As Needed 9/10/2018     Sig - Route: Infuse 100 mL into a venous catheter As Needed (Mg 1.1 - 1.5 mg/dL). - Intravenous    Linked Group 2:  \"Or\" Linked Group Details        magnesium sulfate 4 gram infusion - Mg less than or equal to 1mg/dL 4 g As Needed 9/10/2018     Sig - Route: Infuse 100 mL into a venous catheter As Needed (Mg less than or equal to 1mg/dL). - Intravenous    Linked Group 2:  \"Or\" Linked Group Details        Menthol-Zinc Oxide  3 Times Daily PRN 9/11/2018     Sig - Route: Apply  topically to the appropriate area as directed 3 (Three) Times a Day As Needed (Skin irritation). - Topical    methenamine (HIPREX) tablet 1 g 1 g 2 Times Daily 9/11/2018     Sig - Route: Take 1 tablet by mouth 2 (Two) Times a Day. - Oral    Non-formulary Exception Code: Patient supplied medication    metroNIDAZOLE (FLAGYL) IVPB 500 mg 500 mg Every 8 Hours 9/12/2018 9/22/2018    Sig - Route: Infuse 100 mL into a venous catheter Every 8 (Eight) Hours. - Intravenous    modafinil (PROVIGIL) tablet 200 mg 200 mg Daily 9/11/2018     Sig - Route: Take 2 tablets by mouth Daily. - Oral    nystatin (MYCOSTATIN) powder 1 application 1 application 3 Times Daily PRN 9/11/2018     Sig - Route: Apply 1 application topically to the appropriate area as directed 3 (Three) Times a Day As Needed (skin). - " "Topical    pantoprazole (PROTONIX) EC tablet 40 mg 40 mg Daily 9/11/2018     Sig - Route: Take 1 tablet by mouth Daily. - Oral    potassium chloride (K-DUR,KLOR-CON) CR tablet 40 mEq 40 mEq Daily 9/10/2018     Sig - Route: Take 2 tablets by mouth Daily. - Oral    potassium chloride (KLOR-CON) packet 40 mEq 40 mEq As Needed 9/10/2018     Sig - Route: Take 40 mEq by mouth As Needed (potassium replacement, see admin instructions). - Oral    Linked Group 3:  \"Or\" Linked Group Details        potassium chloride (MICRO-K) CR capsule 40 mEq 40 mEq As Needed 9/10/2018     Sig - Route: Take 4 capsules by mouth As Needed (potassium replacement.  see admin instructions). - Oral    Linked Group 3:  \"Or\" Linked Group Details        potassium chloride 10 mEq in 100 mL IVPB 10 mEq Every 1 Hour PRN 9/10/2018     Sig - Route: Infuse 100 mL into a venous catheter Every 1 (One) Hour As Needed (potassium protocol PERIPHERAL - see admin instructions). - Intravenous    Linked Group 3:  \"Or\" Linked Group Details        sodium chloride (NS) 0.9 % irrigation solution  - ADS Override Pull   9/12/2018 9/12/2018    Notes to Pharmacy: Created by cabinet override    sodium chloride 0.9 % flush 1-10 mL 1-10 mL As Needed 9/10/2018     Sig - Route: Infuse 1-10 mL into a venous catheter As Needed for Line Care. - Intravenous    sodium chloride 0.9 % flush 10 mL 10 mL As Needed 9/10/2018     Sig - Route: Infuse 10 mL into a venous catheter As Needed for Line Care. - Intravenous    Linked Group 4:  \"And\" Linked Group Details        sucralfate (CARAFATE) tablet 1 g 1 g Nightly 9/11/2018     Sig - Route: Take 1 tablet by mouth Every Night. - Oral    vancomycin (VANCOCIN) 2,000 mg in sodium chloride 0.9 % 500 mL IVPB 2,000 mg Once 9/12/2018 9/12/2018    Sig - Route: Infuse 2,000 mg into a venous catheter 1 (One) Time. - Intravenous    vancomycin (VANCOCIN) 2,000 mg in sodium chloride 0.9 % 500 mL IVPB 2,000 mg Every 18 Hours 9/13/2018 9/24/2018    Sig - " Route: Infuse 2,000 mg into a venous catheter Every 18 (Eighteen) Hours. - Intravenous    albuterol (PROVENTIL) nebulizer solution 0.083% 2.5 mg/3mL (Discontinued) 2.5 mg Every 4 Hours PRN 9/11/2018 9/13/2018    Sig - Route: Take 2.5 mg by nebulization Every 4 (Four) Hours As Needed for Wheezing or Shortness of Air. - Nebulization    enoxaparin (LOVENOX) syringe 130 mg (Discontinued) 1 mg/kg × 127 kg Every 12 Hours 9/11/2018 9/13/2018    Sig - Route: Inject 1.3 mL under the skin into the appropriate area as directed Every 12 (Twelve) Hours. - Subcutaneous    ipratropium-albuterol (DUO-NEB) nebulizer solution 3 mL (Discontinued) 3 mL 4 Times Daily - RT 9/13/2018 9/13/2018    Sig - Route: Take 3 mL by nebulization 4 (Four) Times a Day. - Nebulization    Cosign for Ordering: Accepted by Smith Delacruz MD on 9/13/2018  9:59 AM    Pharmacy to dose vancomycin (Discontinued)  Continuous PRN 9/10/2018 9/13/2018    Sig - Route: Continuous As Needed for Consult. - Does not apply    Reason for Discontinue: *Re-Entry            Cultures:    Blood Culture   Date Value Ref Range Status   09/10/2018 No growth at 2 days  Preliminary   09/10/2018 No growth at 2 days  Preliminary           Assessment/Plan     ASSESSMENT:    1.  Chronic osteomyelitis    PLAN:    Patient resting comfortably in bed, no issues or complaints.  No fever or diarrhea reported overnight.    42 y.o. male with past medical history significant for diabetes, hypertension, paraplegia, obesity, sleep apnea, and chronic osteomyelitis who presented to [facility] Emergency Department on 9/10/2018 for shortness of breath.      He does not seem to have any clinical evidence significant of sepsis with no fever, normal lactic acid and normal white count.      For now we recommend to continue with vancomycin based on previous cultures showing growth of streptococcus and enterobacter.      Patient is going to need very close surgical follow up as he will  require further debridement and possible muscle flap.     Consider [facility] referral.    Current Antimicrobials:  Vancomycin pharmacy to dose  Flagyl 500mg IV Q8H      Patient's findings and recommendations were discussed with patient, nursing staff, primary care team and consulting provider    Code Status:   Code Status and Medical Interventions:   Ordered at: 09/10/18 1916     Level Of Support Discussed With:    Patient     Code Status:    CPR     Medical Interventions (Level of Support Prior to Arrest):    Full       GUANACO Flores  09/13/18  1:48 PM    Electronically signed by Verenice Deluca APRN at 9/13/2018  1:54 PM       Consult Notes (last 24 hours) (Notes from 9/13/2018  1:23 PM through 9/14/2018  1:23 PM)     No notes of this type exist for this encounter.        Physical Therapy Notes (last 24 hours) (Notes from 9/13/2018  1:23 PM through 9/14/2018  1:23 PM)     No notes of this type exist for this encounter.        Occupational Therapy Notes (last 24 hours) (Notes from 9/13/2018  1:23 PM through 9/14/2018  1:23 PM)     No notes of this type exist for this encounter.           Nursing Assessments (last 24 hours)      Adult PCS Body System     Row Name 09/14/18 0715 09/14/18 0400 09/14/18 0238 09/13/18 2100 09/13/18 1916       Pain/Comfort/Sleep    Presence of Pain denies pain/discomfort denies pain/discomfort denies pain/discomfort denies pain/discomfort  --    Preferred Pain Scale number (Numeric Rating Pain Scale)  --  -- number (Numeric Rating Pain Scale)  --    Pain Rating (0-10): Rest 0  --  -- 0  --    Pain Rating (0-10): Activity 0  --  -- 0  --    POSS ([place] Opioid-Induced Sed Scale) 1 - Awake and alert  --  -- 1 - Awake and alert  --    Sleep/Rest/Relaxation no problem identified appears asleep no problem identified;appears asleep no problem identified;appears asleep  --       Pain Assessment Additional Detail    Factors That Aggravate Pain  --  --  -- breathing  --    Factors That Relieve  Pain  --  --  -- rest;relaxation techniques  --       Pain/Comfort/Sleep Interventions    Sleep/Rest Enhancement  --  --  -- awakenings minimized  --       Coping/Psychosocial    Observed Emotional State calm;cooperative  --  -- calm;cooperative  --    Verbalized Emotional State acceptance  --  -- acceptance  --    Plan of Care Reviewed With patient  --  --  --  --       Psychosocial Support    Trust Relationship/Rapport care explained  --  -- care explained  --    Diversional Activities television  --  -- television  --    Family/Support System Care support provided  --  -- support provided  --       Involvement in Care    Family/Support System, Persons family  --  --  --  --    Involvement in Care not present at bedside  --  -- not present at bedside  --       Coping/Psychosocial Interventions    Supportive Measures active listening utilized  --  -- active listening utilized  --       HEENT    HEENT WDL WDL  --  -- WDL  --       Mouth/Teeth WDL    Mouth/Teeth WDL WDL  --  -- WDL  --       Cognitive    Cognitive/Neuro/Behavioral WDL WDL  --  -- WDL  --       Neuro    Additional Documentation  --  --  -- Pupils (Group)  --       Pupils    Pupil PERRLA yes  --  -- yes  --       [place] Coma Scale    Best Eye Response 4-->(E4) spontaneous  --  -- 4-->(E4) spontaneous  --    Best Motor Response 6-->(M6) obeys commands  --  -- 6-->(M6) obeys commands  --    Best Verbal Response 5-->(V5) oriented  --  -- 5-->(V5) oriented  --    [place] Coma Scale Score 15  --  -- 15  --    Assessment Qualifiers patient not sedated/intubated  --  --  --  --       Motor Response    Motor Response general motor response  --  -- general motor response  --    General Motor Response purposeful motor response  --  -- purposeful motor response  --       Hand /Ankle Strength    Hand , Left strong  --  -- strong  --    Hand , Right strong  --  -- strong  --    Dorsiflexion, Left absent  --  -- absent  --    Dorsiflexion, Right absent   --  -- absent  --    Plantarflexion, Left absent  --  -- absent  --    Plantarflexion, Right absent  --  -- absent  --       Behavioral    Additional Documentation  --  --  -- Behavior WDL (Group)  --       Behavior WDL    Behavior WDL WDL  --  -- WDL  --       Respiratory    Rhythm/Pattern, Respiratory depth regular  --  -- depth regular  --    Nailbeds no discoloration  --  -- no discoloration  --    Mucous Membranes pink;intact  --  -- pink;intact  --    Cough Frequency infrequent  --  -- infrequent  --    Cough Type good  --  -- good  --    Cough And Deep Breathing done independently per patient  --  -- done independently per patient  --       Breath Sounds    Breath Sounds All Fields  --  -- All Fields  --    All Lung Fields Breath Sounds diminished  --  -- diminished  --       Oxygen Therapy    Flow (L/min) 2  --  -- 2  --    Device (Oxygen Therapy) nasal cannula  --  -- nasal cannula  --       Cardiac    Cardiac Rhythm radial pulse regular  --  --  --  --       ECG    Lead Monitored Lead II  --  -- Lead II  --    Rhythm normal sinus rhythm  --  -- normal sinus rhythm  --       Chest Pain Assessment    Chest Pain Location --   denies  --  -- --   denies chest pain at this time  --    Rating (0-10) 0  --  --  --  --       Peripheral Neurovascular    Capillary Refill, General less than/equal to 3 secs  --  -- less than/equal to 3 secs  --    Capillary Refill, LUE less than/equal to 3 secs  --  -- less than/equal to 3 secs  --    Capillary Refill, RUE less than/equal to 3 secs  --  -- less than/equal to 3 secs  --    Capillary Refill, LLE unable to assess  --  -- unable to assess  --    Capillary Refill, RLE less than/equal to 3 secs  --  -- less than/equal to 3 secs  --    VTE Prevention/Management --   eliquis  --  --  --  --       Neurovascular Assessment    Neurovascular Assessment  --  --  -- General  --    LUE Temperature warm  --  -- warm  --    LUE Color silvia  --  -- silvia  --    LUE Sensation no  tingling;no numbness  --  -- no tingling;no numbness  --    RUE Temperature warm  --  -- warm  --    RUE Color silvia  --  -- silvia  --    RUE Sensation no tingling;no numbness  --  -- no tingling;no numbness  --    RLE Temperature warm  --  -- warm  --    RLE Color pale  --  -- pale  --       Pulse Radial    Left Radial Pulse 2+ (normal)  --  -- 2+ (normal)  --    Right Radial Pulse 2+ (normal)  --  -- 2+ (normal)  --       Pulse Dorsalis Pedis    Right Dorsalis Pedis Pulse 1+ (weak)  --  -- 1+ (weak)  --       Edema    Edema foot, right  --  --  --  --    Ankle, Right Edema 2+ (Mild)  --  -- 2+ (Mild)  --    Foot, Right Edema 2+ (Mild)  --  -- 2+ (Mild)  --       Gastrointestinal    GI WDL appearance/characteristics;GI symptoms  --  -- appearance/characteristics;GI symptoms  --    Bowel Sounds All Quadrants  --  -- All Quadrants  --    All Quadrants Bowel Sounds audible and active in all quadrants  --  -- audible and active in all quadrants  --    LUQ Bowel Sounds  --  --  -- audible and active in all quadrants  --    RUQ Bowel Sounds  --  --  -- audible and active in all quadrants  --    LLQ Bowel Sounds  --  --  -- audible and active in all quadrants  --    RLQ Bowel Sounds  --  --  -- audible and active in all quadrants  --    Stool Amount  --  --  -- moderate  --    GI Signs/Symptoms  --  --  -- hemorrhoid  --       Genitourinary    Voiding Characteristics urostomy  --  -- urostomy  --    Urine Characteristics yellow  --  -- yellow  --    Urostomy Management appliance integrity confirmed  --  -- appliance integrity confirmed  --    Additional Documentation  --  --  -- Urostomy Management (Row)  --       Skin    Skin WDL WDL except  --  --  --  --    Skin Color/Characteristics redness blanchable  --  -- redness blanchable  --    Skin Temperature warm  --  -- warm  --    Skin Moisture moist  --  -- moist  --    Skin Elasticity quick return to original state  --  -- quick return to original state  --    Skin  Integrity intact  --  -- intact intact       Alejandro Risk Assessment (If Alejandro score </= 18, add the appropriate CPG to the care plan)    Sensory Perception 3-->slightly limited  --  -- 3-->slightly limited  --    Moisture 3-->occasionally moist  --  -- 3-->occasionally moist  --    Activity 1-->bedfast  --  -- 1-->bedfast  --    Mobility 2-->very limited  --  -- 2-->very limited  --    Nutrition 3-->adequate  --  -- 3-->adequate  --    Friction and Shear 1-->problem  --  -- 1-->problem  --    Alejandro Score 13  --  -- 13  --       Wound 09/10/18 2000 Left gluteal pressure injury    Wound - Properties Group Date first assessed: 09/10/18 Time first assessed: 2000 Present On Admission : yes Side: Left Location: gluteal Type: pressure injury Stage, Pressure Injury: Stage 4    Dressing Appearance  --  --  -- dry;intact  --    Periwound  --  --  -- non-blanchable;redness  --    Drainage Characteristics/Odor  --  --  -- purulent  --    Drainage Amount  --  --  -- large  --    Care, Wound  --  --  -- cleansed with;sterile normal saline  --    Dressing Care, Wound  --  --  -- dressing changed  --       Wound 09/10/18 2000 Right gluteal pressure injury    Wound - Properties Group Date first assessed: 09/10/18 Time first assessed: 2000 Present On Admission : yes Side: Right Location: gluteal Type: pressure injury Stage, Pressure Injury: Stage 4    Dressing Appearance  --  --  -- dry;intact  --    Periwound  --  --  -- redness  --    Periwound Temperature  --  --  -- warm  --    Periwound Skin Turgor  --  --  -- soft  --    Edges  --  --  -- irregular  --    Drainage Characteristics/Odor  --  --  -- purulent  --    Drainage Amount  --  --  -- large  --    Care, Wound  --  --  -- cleansed with;sterile normal saline  --    Dressing Care, Wound  --  --  -- dressing changed  --       Wound 09/10/18 2000 Right lower toe other (see comments);abrasion    Wound - Properties Group Date first assessed: 09/10/18 Time first assessed: 2000  Present On Admission : yes Side: Right Orientation: lower Location: toe Type: other (see comments);abrasion , 4TH TOE     Dressing Appearance  --  --  -- open to air  --    Closure  --  --  -- Approximated  --    Base  --  --  -- scab  --    Periwound  --  --  -- intact;dry  --    Periwound Temperature  --  --  -- warm  --    Periwound Skin Turgor  --  --  -- soft  --    Drainage Amount  --  --  -- none  --    Care, Wound  --  --  -- cleansed with;sterile normal saline  --    Dressing Care, Wound  --  --  -- open to air  --       Wound 09/11/18 coccyx pressure injury    Wound - Properties Group Date first assessed: 09/11/18 Present On Admission : yes Location: coccyx Type: pressure injury Stage, Pressure Injury: Stage 2    Dressing Appearance  --  --  -- open to air  --    Base  --  --  -- moist;pink  --    Periwound  --  --  -- intact  --    Periwound Temperature  --  --  -- warm  --    Periwound Skin Turgor  --  --  -- soft  --    Care, Wound  --  --  -- cleansed with;sterile normal saline  --    Dressing Care, Wound  --  --  -- open to air  --       Skin Interventions    Pressure Reduction Devices specialty bed utilized  --  -- specialty bed utilized  --    Pressure Reduction Techniques frequent weight shift encouraged;heels elevated off bed  --  --  --  --    Skin Protection  --  --  -- pulse oximeter probe site changed  --       Musculoskeletal    General Mobility moderately impaired  --  --  --  --       Functional Screen (every 3 days/change)    Ambulation 1 - assistive equipment  --  --  --  --    Transferring 2 - assistive person  --  --  --  --    Toileting 0 - independent  --  --  --  --    Bathing 2 - assistive person  --  --  --  --    Dressing 2 - assistive person  --  --  --  --    Eating 0 - independent  --  --  --  --    Communication 0 - understands/communicates without difficulty  --  --  --  --    Swallowing 0 - swallows foods/liquids without difficulty  --  --  --  --       Nutrition     Diet/Nutrition Received consistent carb/diabetic diet  --  -- consistent carb/diabetic diet  --       Nutrition Interventions    Glycemic Management blood glucose monitoring  --  -- blood glucose monitoring  --       Colostomy LLQ    Colostomy Properties Placement Date: 07/27/18 Placement Time: 2219 Hand Hygiene Completed: Yes Location: [facility] , pt was admitted with colostomy     Stomal Appliance Clean;Dry;Intact  --  -- Clean;Dry;Intact  --    Stoma Appearance red  --  -- red  --    Peristomal Assessment Clean;Intact  --  -- Clean;Intact  --    Stoma Function  --  --  -- stool  --    Stool Color  --  --  -- brown  --       Urostomy RLQ    Urostomy Properties Placement Date: 07/27/18 Placement Time: 2219 Location: [facility]    Stomal Appliance 1 piece  --  -- 1 piece  --    Stoma Appearance rosebud appearance  --  -- rosebud appearance  --    Peristomal Assessment Clean;Intact  --  -- Clean;Intact  --    Output (mL) 700 mL  --  --  --  --       Midline Catheter - Single Lumen 09/14/18 Right Basilic    Midline Catheter - Properties Group Placement Date: 09/14/18 Placement Time: 1001 Hand Hygiene Completed: Yes Site Prep: Chlorhexidine Orientation: Right Location: Basilic , lower  Initial Exposed Catheter (cm): 0 cm Initial Extremity Circumference (cm): 37 cm Inserted by: stella harrell rn  Total insertion attempts: 1 Local Anesthetic: None Patient Tolerance: Tolerated well       Peripheral IV 09/10/18 1935 Right Antecubital    IV Properties Placement Date: 09/10/18 Placement Time: 1935 Hand Hygiene Completed: Yes Size (Gauge): 20 G Orientation: Right Location: Antecubital Site Prep: Alcohol Local Anesthetic: None Total insertion attempts: 1 Patient Tolerance: Tolerated well       PICC Single Lumen 07/31/18 Left Basilic    Line Properties Placement Date: 07/31/18 Placement Time: 1331 Hand Hygiene Completed: Yes Size (Fr): 4 Description (optional): single lumen Length (cm): 48 cm Orientation: Left Location: Basilic Site Prep:  Chlorhexidine Local Anesthetic: Injectable Initial Extremity Circumference (cm): 39 cm Initial Exposed Catheter (cm): 2 cm Inserted by: stella reynoso RN Total insertion attempts: 1 Patient Tolerance: Tolerated well Placement Verification: ECG tip confirmation system    Site Assessment Clean;Dry;Intact  --  -- Clean;Dry;Intact  --    #1 Lumen Status Blood return noted  --  -- Blood return noted  --    Line Care  --  --  -- Cap changed  --    Dressing Status Clean;Dry;Intact  --  -- Clean;Dry;Intact  --       Sepsis Screening Tool    Previous screen positive? no  --  -- no  --    Are 2 or > of the above criteria present? no: STOP/negative screen  --  -- no: STOP/negative screen  --       Safety    Safety WDL WDL  --  -- WDL  --    Safety Factors ID band on;upper side rails raised x 2;call light in reach;wheels locked;bed in low position  --  -- ID band on;upper side rails raised x 2;call light in reach;wheels locked;bed in low position  --    All Alarms alarm(s) activated and audible  --  -- alarm(s) activated and audible  --    Safety/Security Measures bed alarm set  --  -- bed alarm set  --       [facility] High Risk Falls Assessment (If Fall score is >/=13, add the Fall Risk CPG to the care plan)     Fallen in past 6 months 0--> No  --  -- 0--> No  --    Mental Status 0--> no mental status change  --  -- 0--> no mental status change  --    Elimination 0--> No elimination issues  --  -- 0--> No elimination issues  --    Mobility 2--> Requires assistance- transfer, walker, etc.  --  -- 2--> Requires assistance- transfer, walker, etc.  --    Medications 1--> Narcotics  --  -- 1--> Narcotics  --    Nurses' Clinical Judgement 6  --  -- 5  --    Total Fall Risk Score 9  --  -- 8  --       Safety Management    Safety Promotion/Fall Prevention safety round/check completed safety round/check completed safety round/check completed safety round/check completed  --    Medication Review/Management medications reviewed   -- medications reviewed medications reviewed  --    Environmental Safety Modification assistive device/personal items within reach;clutter free environment maintained;lighting adjusted  --  -- assistive device/personal items within reach  --       Provider Notification    Reason for Communication  --  -- Critical lab value  --  --    Provider Name  --  -- Aguilar  --  --    Notification Route  --  -- In person, on unit  --  --    Response  --  -- See orders  --  --    Row Name 09/13/18 1800 09/13/18 1600 09/13/18 1400             Wound 09/10/18 2000 Left gluteal pressure injury    Wound - Properties Group Date first assessed: 09/10/18 Time first assessed: 2000 Present On Admission : yes Side: Left Location: gluteal Type: pressure injury Stage, Pressure Injury: Stage 4       Wound 09/10/18 2000 Right gluteal pressure injury    Wound - Properties Group Date first assessed: 09/10/18 Time first assessed: 2000 Present On Admission : yes Side: Right Location: gluteal Type: pressure injury Stage, Pressure Injury: Stage 4       Wound 09/10/18 2000 Right lower toe other (see comments);abrasion    Wound - Properties Group Date first assessed: 09/10/18 Time first assessed: 2000 Present On Admission : yes Side: Right Orientation: lower Location: toe Type: other (see comments);abrasion , 4TH TOE        Wound 09/11/18 coccyx pressure injury    Wound - Properties Group Date first assessed: 09/11/18 Present On Admission : yes Location: coccyx Type: pressure injury Stage, Pressure Injury: Stage 2       Colostomy LLQ    Colostomy Properties Placement Date: 07/27/18 Placement Time: 2219 Hand Hygiene Completed: Yes Location: [facility] pt was admitted with colostomy        Urostomy RLQ    Urostomy Properties Placement Date: 07/27/18 Placement Time: 2219 Location: RLQ       Peripheral IV 09/10/18 1935 Right Antecubital    IV Properties Placement Date: 09/10/18 Placement Time: 1935 Hand Hygiene Completed: Yes Size (Gauge): 20 G Orientation:  Right Location: Antecubital Site Prep: Alcohol Local Anesthetic: None Total insertion attempts: 1 Patient Tolerance: Tolerated well       PICC Single Lumen 07/31/18 Left Basilic    Line Properties Placement Date: 07/31/18 Placement Time: 1331 Hand Hygiene Completed: Yes Size (Fr): 4 Description (optional): single lumen Length (cm): 48 cm Orientation: Left Location: Basilic Site Prep: Chlorhexidine Local Anesthetic: Injectable Initial Extremity Circumference (cm): 39 cm Initial Exposed Catheter (cm): 2 cm Inserted by: stella reynoso RN Total insertion attempts: 1 Patient Tolerance: Tolerated well Placement Verification: ECG tip confirmation system       Safety    Safety WDL WDL WDL WDL      Safety Factors ID band on;upper side rails raised x 2;call light in reach;wheels locked;bed in low position ID band on;upper side rails raised x 2;call light in reach;wheels locked;bed in low position ID band on;upper side rails raised x 2;call light in reach;bed in low position;wheels locked      All Alarms alarm(s) activated and audible alarm(s) activated and audible alarm(s) activated and audible      Safety/Security Measures bed alarm set bed alarm set bed alarm set         Safety Management    Safety Promotion/Fall Prevention safety round/check completed safety round/check completed safety round/check completed

## 2018-09-14 NOTE — PROGRESS NOTES
LOS: 4 days     Chief Complaint:  Pulmonology is following for pulmonary embolism    Subjective     Interval History:     Mr. Krueger is awake and alert. He is resting comfortably in bed. He is tolerating nasal cannula.  She states that he's had some occasional episodes of chest pain last night but those have since resolved and he is doing well today.  He states that he did use a BiPAP machine for sleep.    History taken from: patient chart RN    Review of Systems:     Review of Systems - History obtained from chart review and the patient  General ROS: negative for - chills, fatigue or fever  Psychological ROS: negative for - anxiety or depression  ENT ROS: negative for - headaches, visual changes or vocal changes  Allergy and Immunology ROS: negative for - postnasal drip or seasonal allergies  Endocrine ROS: negative for - polydipsia/polyuria  Respiratory ROS: positive for - shortness of breath.  Negative for - cough, wheezing  Cardiovascular ROS: Positive for - chest pain (last night only, episodic). negative for - edema  Gastrointestinal ROS: negative for - abdominal pain or change in bowel habits  Musculoskeletal ROS: negative for - joint pain or joint swelling  Neurological ROS: no TIA or stroke symptoms                  Objective     Vital Signs  Temp:  [97.5 °F (36.4 °C)-97.7 °F (36.5 °C)] 97.7 °F (36.5 °C)  Heart Rate:  [69-99] 84  Resp:  [16-20] 18  BP: (115-147)/(67-80) 115/67  FiO2 (%):  [30 %] 30 %  Body mass index is 39.48 kg/m².    Intake/Output Summary (Last 24 hours) at 09/14/18 1505  Last data filed at 09/14/18 1402   Gross per 24 hour   Intake              720 ml   Output             3375 ml   Net            -2655 ml     I/O this shift:  In: 720 [P.O.:720]  Out: 1250 [Urine:1250]    Physical Exam:  GENERAL APPEARANCE: Well developed, well nourished, alert and cooperative, and appears to be in no acute distress.  Tolerating nasal cannula    HEAD: normocephalic.  Atraumatic    EYES: PERRL. EOMI. vision  grossly intact    NECK: Neck supple.  No thyromegaly    CARDIAC: Normal S1 and S2. No S3, S4 or murmurs. Rhythm is regular. There is no peripheral edema, cyanosis or pallor. Extremities are warm and well perfused. Capillary refill is less than 2 seconds. No carotid bruits.    RESPIRATORY: Diminished breath sounds with bibasilar crackles noted at the lung bases. Diffuse rhonchi noted of the lungs bilaterally.   No wheezing upon auscultation.     GI: Positive bowel sounds. Soft, nondistended, nontender.     MUSCULOSKELTAL: No significant deformity or joint abnormality. No edema. Peripheral pulses intact.     NEUROLOGICAL: Strength and sensation symmetric and intact throughout.     PSYCHIATRIC: The mental examination revealed the patient was oriented to person, place, and time.                   Results Review:                I reviewed the patient's new clinical results.  I reviewed the patient's new imaging results and agree with the interpretation.    Results from last 7 days  Lab Units 09/14/18  0110 09/13/18 0717 09/12/18 0105   WBC 10*3/mm3 8.50 7.73 11.05   HEMOGLOBIN g/dL 9.6* 9.8* 10.1*   PLATELETS 10*3/mm3 301 303 285       Results from last 7 days  Lab Units 09/14/18  0110 09/13/18  2003 09/13/18  0717 09/12/18  0105  09/10/18  2113   SODIUM mmol/L 139  --  143 136  < >  --    POTASSIUM mmol/L 3.9  --  4.6 3.4*  < >  --    CHLORIDE mmol/L 109  --  112 107  < >  --    CO2 mmol/L 24.3  --  22.5* 19.5*  < >  --    BUN mg/dL 20  --  22* 15  < >  --    CREATININE mg/dL 1.01  --  1.06 1.03  < >  --    CALCIUM mg/dL 8.6  --  8.8 8.6  < >  --    GLUCOSE mg/dL 175*  --  113* 129*  < >  --    MAGNESIUM mg/dL 1.4* 1.7  --   --   --  1.6*   < > = values in this interval not displayed.  Lab Results   Component Value Date    INR 1.12 (H) 09/10/2018    INR 1.11 (H) 08/18/2018    INR 1.19 (H) 11/05/2017    PROTIME 14.6 09/10/2018    PROTIME 14.5 08/18/2018    PROTIME 15.3 11/05/2017       Results from last 7 days  Lab  Units 09/14/18  0110 09/13/18  0717 09/12/18  0105   ALK PHOS U/L 66 68 64   BILIRUBIN mg/dL 0.2 0.2 0.4   ALT (SGPT) U/L 19 26 24   AST (SGOT) U/L 14 19 19       Results from last 7 days  Lab Units 09/11/18  0940   PH, ARTERIAL pH units 7.448   PO2 ART mm Hg 76.9*   PCO2, ARTERIAL mm Hg 30.8*   HCO3 ART mmol/L 20.8*     Imaging Results (last 24 hours)     ** No results found for the last 24 hours. **             Medication Review:   Scheduled Medications:    acetylcysteine 4 mL Inhalation BID - RT   apixaban 10 mg Oral Q12H   Followed by      [START ON 9/20/2018] apixaban 5 mg Oral Q12H   ARIPiprazole 10 mg Oral Nightly   atorvastatin 10 mg Oral Nightly   baclofen 30 mg Oral 4x Daily With Meals & Nightly   doxycycline 100 mg Oral Q12H   DULoxetine 60 mg Oral Daily   gabapentin 800 mg Oral Q8H   insulin aspart 0-7 Units Subcutaneous 4x Daily AC & at Bedtime   insulin detemir 15 Units Subcutaneous BID   ipratropium-albuterol 3 mL Nebulization Q6H - RT   lactobacillus acidophilus 1 capsule Oral Daily   methenamine 1 g Oral BID   metroNIDAZOLE 500 mg Oral Q8H   modafinil 200 mg Oral Daily   pantoprazole 40 mg Oral Daily   potassium chloride 40 mEq Oral Daily   sucralfate 1 g Oral Nightly     Continuous infusions:       Assessment/Plan        - Using 2 L nasal cannula and saturating at 94%  - Hemodynamically stable  - No reported fever overnight  - Input/Output was net -2.1 L over the past 24 hours.  Total output was 2.8 L for the last 24 hours  - Medications include Mucomyst twice daily, DuoNeb every 6 hours, Flagyl  - Eliquis for active DVT/PE    Assessment  - Pulmonary embolism: Submassive bilateral, saddle  - Pulmonary hypertension  - Physical deconditioning  - Obesity hypoventilation syndrome  - Obstructive sleep apnea  - Lateral basal atelectasis  - Morbid obesity     Plan  - Continue Eliquis 2 mg tablets and 5 mg tablet every 12 hours by mouth.  - Titrate FiO2 to keep SPO2 above 90%.  - Watch for cardiac  arrhythmia.  - PT/OT while in hospital  - Incentive spirometry to avoid basal atelectasis  - Recommend obtaining sleep study and pulmonary function test post discharge  - Continue to DuoNeb every 6 hours and Mucomyst twice daily.   - Started on BiPAP 15/8 with FiO2 30% while sleeping  - patient is high risk of developing chronic thromboembolic pulmonary hypertension.   - Discussed with the patient the importance of wearing the BiPAP machine not only at night, but anytime during sleep.  Discussed with him to notify the nursing staff to assist him with using the machine if he felt tired throughout the day.  Discussed negative effects of sleep apnea and hypoxia.  Patient conveyed understanding.  He stated that he did use the BiPAP at night and would try to use it during the day if he napped as well.        Patient Active Problem List   Diagnosis Code   • Infected decubitus ulcer L89.90, L08.9   • Decubitus skin ulcer L89.90   • Sepsis (CMS/HCC) A41.9   • DM2 (diabetes mellitus, type 2) (CMS/HCC) E11.9   • Osteomyelitis of pelvic region, acute (CMS/HCC) M86.159   • Decubitus ulcer of right buttock L89.319   • Pulmonary embolus (CMS/HCC) I26.99   • Bilateral pulmonary embolism (CMS/HCC) I26.99   • Chronic osteomyelitis (CMS/HCC) M86.60             Tiana Doe PA-C  09/14/18  3:05 PM      Scribed for Dr. Delacruz by Tiana Doe PA-C    ISmith M.D. attest that the above note accurately reflects the work and decisions made  by me.  Patient was seen and evaluated by Dr. Delacruz, including history of present illness, physical exam, assessment, and treatment plan.  The above note was reviewed and edited by Dr. Delacruz.    Thank you for involving us in the care of the patient.    Smith Delacruz M.D  Pulmonary and Critical Care Medicine

## 2018-09-14 NOTE — PLAN OF CARE
Problem: Patient Care Overview  Goal: Plan of Care Review  Outcome: Ongoing (interventions implemented as appropriate)    Goal: Individualization and Mutuality  Outcome: Ongoing (interventions implemented as appropriate)    Goal: Discharge Needs Assessment  Outcome: Ongoing (interventions implemented as appropriate)    Goal: Interprofessional Rounds/Family Conf  Outcome: Ongoing (interventions implemented as appropriate)      Problem: VTE, DVT and PE (Adult)  Goal: Signs and Symptoms of Listed Potential Problems Will be Absent, Minimized or Managed (VTE, DVT and PE)  Outcome: Ongoing (interventions implemented as appropriate)      Problem: Fall Risk (Adult)  Goal: Absence of Fall  Outcome: Ongoing (interventions implemented as appropriate)      Problem: Skin Injury Risk (Adult)  Goal: Skin Health and Integrity  Outcome: Ongoing (interventions implemented as appropriate)      Problem: Wound (Includes Pressure Injury) (Adult)  Goal: Signs and Symptoms of Listed Potential Problems Will be Absent, Minimized or Managed (Wound)  Outcome: Ongoing (interventions implemented as appropriate)      Problem: Diabetes, Type 2 (Adult)  Goal: Signs and Symptoms of Listed Potential Problems Will be Absent, Minimized or Managed (Diabetes, Type 2)  Outcome: Ongoing (interventions implemented as appropriate)

## 2018-09-14 NOTE — NURSING NOTE
Received consult for midline.  Pt currently has PICC line left arm.  Pt has bilateral PE's per CT scan.  Talked with Dr. Pack about pro's and con's of removing PICC and placing another line.  Dr. Pack wants to remove line and place IV line on opposite side. Pt has no visible peripheral site noted.   Power glide placed with per ultrasound right lower basilic vein without difficult.  Talked with Sana Morales with [facility].  Stated she is traded with power glide midlines.  Re-education given for signs of infection and signs of blood clots.  Sana verbalized understanding.  Patient also educated for s/s of blood clots and infection.

## 2018-09-15 ENCOUNTER — READMISSION MANAGEMENT (OUTPATIENT)
Dept: CALL CENTER | Facility: HOSPITAL | Age: 43
End: 2018-09-15

## 2018-09-15 LAB
BACTERIA SPEC AEROBE CULT: NORMAL
BACTERIA SPEC AEROBE CULT: NORMAL

## 2018-09-15 NOTE — OUTREACH NOTE
Prep Survey      Responses   Facility patient discharged from?  [facility]   Is patient eligible?  Yes   Discharge diagnosis  Acute extensive bilateral PE   Does the patient have one of the following disease processes/diagnoses(primary or secondary)?  Other   Does the patient have Home health ordered?  Yes   What is the Home health agency?   [facility]   Is there a DME ordered?  No   Medication alerts for this patient  Eliquis    Prep survey completed?  Yes          Yamila Núñez RN

## 2018-09-17 ENCOUNTER — READMISSION MANAGEMENT (OUTPATIENT)
Dept: CALL CENTER | Facility: HOSPITAL | Age: 43
End: 2018-09-17

## 2018-09-17 NOTE — OUTREACH NOTE
Medical Week 1 Survey      Responses   Facility patient discharged from?  [facility]   Does the patient have one of the following disease processes/diagnoses(primary or secondary)?  Other   Is there a successful TCM telephone encounter documented?  No   Week 1 attempt successful?  Yes   Call start time  1008   Call end time  1012   Discharge diagnosis  Acute extensive bilateral PE   Is patient permission given to speak with other caregiver?  Yes   List who call center can speak with  Brother, Emmanuel, or Vj   Person spoke with today (if not patient) and mikayla Rogers   Medication alerts for this patient  Eliquis    Meds reviewed with patient/caregiver?  Yes   Is the patient having any side effects they believe may be caused by any medication additions or changes?  No   Does the patient have all medications ordered at discharge?  Yes   Is the patient taking all medications as directed (includes completed medication regime)?  Yes   Comments regarding appointments  Home Health has been out there   Does the patient have a primary care provider?   Yes   Does the patient have an appointment with their PCP within 7 days of discharge?  Yes   Has the patient kept scheduled appointments due by today?  Yes   What is the Home health agency?   [facility]   Has home health visited the patient within 72 hours of discharge?  Yes   Home health comments  Has been there   DME comments  has wheel chair   Psychosocial issues?  No   Did the patient receive a copy of their discharge instructions?  Yes   Nursing interventions  Reviewed instructions with patient, Educated on MyChart   What is the patient's perception of their health status since discharge?  Improving   Is the patient/caregiver able to teach back signs and symptoms related to disease process for when to call PCP?  Yes   Is the patient/caregiver able to teach back signs and symptoms related to disease process for when to call 911?  Yes   Is the patient/caregiver able to teach  back the hierarchy of who to call/visit for symptoms/problems? PCP, Specialist, Home health nurse, Urgent Care, ED, 911  Yes   Week 1 call completed?  Yes          Lizeth Quiroz, RN

## 2018-09-25 ENCOUNTER — READMISSION MANAGEMENT (OUTPATIENT)
Dept: CALL CENTER | Facility: HOSPITAL | Age: 43
End: 2018-09-25
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
## 2018-10-02 ENCOUNTER — READMISSION MANAGEMENT (OUTPATIENT)
Dept: CALL CENTER | Facility: HOSPITAL | Age: 43
End: 2018-10-02

## 2018-10-02 NOTE — OUTREACH NOTE
Medical Week 3 Survey      Responses   Facility patient discharged from?  [facility]   Does the patient have one of the following disease processes/diagnoses(primary or secondary)?  Other   Week 3 attempt successful?  No   Unsuccessful attempts  Attempt 1          Lizeth Quiroz RN

## 2018-10-03 ENCOUNTER — READMISSION MANAGEMENT (OUTPATIENT)
Dept: CALL CENTER | Facility: HOSPITAL | Age: 43
End: 2018-10-03

## 2018-10-03 NOTE — OUTREACH NOTE
Medical Week 3 Survey      Responses   Facility patient discharged from?  [facility]   Does the patient have one of the following disease processes/diagnoses(primary or secondary)?  Other   Week 3 attempt successful?  No   Unsuccessful attempts  Attempt 2          Lizeth Blackmon RN

## 2019-02-22 ENCOUNTER — HOSPITAL ENCOUNTER (INPATIENT)
Facility: HOSPITAL | Age: 44
LOS: 2 days | Discharge: HOME HEALTH | End: 2019-02-26
Attending: FAMILY MEDICINE | Admitting: INTERNAL MEDICINE

## 2019-02-22 ENCOUNTER — APPOINTMENT (OUTPATIENT)
Dept: GENERAL RADIOLOGY | Facility: HOSPITAL | Age: 44
End: 2019-02-22

## 2019-02-22 DIAGNOSIS — J40 BRONCHITIS: ICD-10-CM

## 2019-02-22 DIAGNOSIS — E83.42 HYPOMAGNESEMIA: Primary | ICD-10-CM

## 2019-02-22 DIAGNOSIS — L89.002 PRESSURE INJURY OF ELBOW, STAGE 2, UNSPECIFIED LATERALITY (HCC): ICD-10-CM

## 2019-02-22 DIAGNOSIS — R79.89 ELEVATED LACTIC ACID LEVEL: ICD-10-CM

## 2019-02-22 LAB
A-A DO2: 36.5 MMHG (ref 0–300)
ALBUMIN SERPL-MCNC: 4 G/DL (ref 3.5–5)
ALBUMIN/GLOB SERPL: 1.2 G/DL (ref 1.5–2.5)
ALP SERPL-CCNC: 75 U/L (ref 40–129)
ALT SERPL W P-5'-P-CCNC: 52 U/L (ref 10–44)
ANION GAP SERPL CALCULATED.3IONS-SCNC: 7.4 MMOL/L (ref 3.6–11.2)
ARTERIAL PATENCY WRIST A: POSITIVE
AST SERPL-CCNC: 36 U/L (ref 10–34)
ATMOSPHERIC PRESS: 733 MMHG
BASE EXCESS BLDA CALC-SCNC: 1.3 MMOL/L
BASOPHILS # BLD AUTO: 0.02 10*3/MM3 (ref 0–0.3)
BASOPHILS NFR BLD AUTO: 0.2 % (ref 0–2)
BDY SITE: ABNORMAL
BILIRUB SERPL-MCNC: 0.2 MG/DL (ref 0.2–1.8)
BNP SERPL-MCNC: 47 PG/ML (ref 0–100)
BODY TEMPERATURE: 98.6 C
BUN BLD-MCNC: 18 MG/DL (ref 7–21)
BUN/CREAT SERPL: 22 (ref 7–25)
CALCIUM SPEC-SCNC: 9.4 MG/DL (ref 7.7–10)
CHLORIDE SERPL-SCNC: 108 MMOL/L (ref 99–112)
CO2 SERPL-SCNC: 24.6 MMOL/L (ref 24.3–31.9)
COHGB MFR BLD: 1.3 % (ref 0–5)
CREAT BLD-MCNC: 0.82 MG/DL (ref 0.43–1.29)
CRP SERPL-MCNC: 3.47 MG/DL (ref 0–0.99)
D-LACTATE SERPL-SCNC: 2.2 MMOL/L (ref 0.5–2)
DEPRECATED RDW RBC AUTO: 50.2 FL (ref 37–54)
EOSINOPHIL # BLD AUTO: 0.28 10*3/MM3 (ref 0–0.7)
EOSINOPHIL NFR BLD AUTO: 2.8 % (ref 0–5)
ERYTHROCYTE [DISTWIDTH] IN BLOOD BY AUTOMATED COUNT: 16.7 % (ref 11.5–14.5)
FLUAV AG NPH QL: NEGATIVE
FLUBV AG NPH QL IA: NEGATIVE
GFR SERPL CREATININE-BSD FRML MDRD: 104 ML/MIN/1.73
GLOBULIN UR ELPH-MCNC: 3.4 GM/DL
GLUCOSE BLD-MCNC: 161 MG/DL (ref 70–110)
HCO3 BLDA-SCNC: 25.9 MMOL/L (ref 22–26)
HCT VFR BLD AUTO: 38.5 % (ref 42–52)
HCT VFR BLD CALC: 36 % (ref 42–52)
HGB BLD-MCNC: 11.5 G/DL (ref 14–18)
HGB BLDA-MCNC: 12.3 G/DL (ref 12–16)
HOROWITZ INDEX BLD+IHG-RTO: 21 %
IMM GRANULOCYTES # BLD AUTO: 0.03 10*3/MM3 (ref 0–0.03)
IMM GRANULOCYTES NFR BLD AUTO: 0.3 % (ref 0–0.5)
LYMPHOCYTES # BLD AUTO: 2.32 10*3/MM3 (ref 1–3)
LYMPHOCYTES NFR BLD AUTO: 23.5 % (ref 21–51)
MAGNESIUM SERPL-MCNC: 1.3 MG/DL (ref 1.7–2.6)
MCH RBC QN AUTO: 24.6 PG (ref 27–33)
MCHC RBC AUTO-ENTMCNC: 29.9 G/DL (ref 33–37)
MCV RBC AUTO: 82.3 FL (ref 80–94)
METHGB BLD QL: 0.2 % (ref 0–3)
MODALITY: ABNORMAL
MONOCYTES # BLD AUTO: 0.31 10*3/MM3 (ref 0.1–0.9)
MONOCYTES NFR BLD AUTO: 3.1 % (ref 0–10)
NEUTROPHILS # BLD AUTO: 6.9 10*3/MM3 (ref 1.4–6.5)
NEUTROPHILS NFR BLD AUTO: 70.1 % (ref 30–70)
OSMOLALITY SERPL CALC.SUM OF ELEC: 284.8 MOSM/KG (ref 273–305)
OXYHGB MFR BLDV: 86.8 % (ref 85–100)
PCO2 BLDA: 41.1 MM HG (ref 35–45)
PH BLDA: 7.42 PH UNITS (ref 7.35–7.45)
PLATELET # BLD AUTO: 409 10*3/MM3 (ref 130–400)
PMV BLD AUTO: 10.3 FL (ref 6–10)
PO2 BLDA: 58.3 MM HG (ref 80–100)
POTASSIUM BLD-SCNC: 3.3 MMOL/L (ref 3.5–5.3)
PROT SERPL-MCNC: 7.4 G/DL (ref 6–8)
RBC # BLD AUTO: 4.68 10*6/MM3 (ref 4.7–6.1)
SAO2 % BLDCOA: 88.1 % (ref 90–100)
SODIUM BLD-SCNC: 140 MMOL/L (ref 135–153)
TSH SERPL DL<=0.05 MIU/L-ACNC: 1.34 MIU/ML (ref 0.55–4.78)
WBC NRBC COR # BLD: 9.86 10*3/MM3 (ref 4.5–12.5)

## 2019-02-22 PROCEDURE — 94640 AIRWAY INHALATION TREATMENT: CPT

## 2019-02-22 PROCEDURE — 85025 COMPLETE CBC W/AUTO DIFF WBC: CPT | Performed by: FAMILY MEDICINE

## 2019-02-22 PROCEDURE — 93010 ELECTROCARDIOGRAM REPORT: CPT | Performed by: INTERNAL MEDICINE

## 2019-02-22 PROCEDURE — 82375 ASSAY CARBOXYHB QUANT: CPT | Performed by: FAMILY MEDICINE

## 2019-02-22 PROCEDURE — 94799 UNLISTED PULMONARY SVC/PX: CPT

## 2019-02-22 PROCEDURE — 83735 ASSAY OF MAGNESIUM: CPT | Performed by: FAMILY MEDICINE

## 2019-02-22 PROCEDURE — 83605 ASSAY OF LACTIC ACID: CPT | Performed by: FAMILY MEDICINE

## 2019-02-22 PROCEDURE — 71045 X-RAY EXAM CHEST 1 VIEW: CPT

## 2019-02-22 PROCEDURE — 25010000002 MAGNESIUM SULFATE 2 GM/50ML SOLUTION: Performed by: FAMILY MEDICINE

## 2019-02-22 PROCEDURE — 71045 X-RAY EXAM CHEST 1 VIEW: CPT | Performed by: RADIOLOGY

## 2019-02-22 PROCEDURE — 36600 WITHDRAWAL OF ARTERIAL BLOOD: CPT | Performed by: FAMILY MEDICINE

## 2019-02-22 PROCEDURE — 83880 ASSAY OF NATRIURETIC PEPTIDE: CPT | Performed by: FAMILY MEDICINE

## 2019-02-22 PROCEDURE — 83050 HGB METHEMOGLOBIN QUAN: CPT | Performed by: FAMILY MEDICINE

## 2019-02-22 PROCEDURE — 86140 C-REACTIVE PROTEIN: CPT | Performed by: FAMILY MEDICINE

## 2019-02-22 PROCEDURE — 82805 BLOOD GASES W/O2 SATURATION: CPT | Performed by: FAMILY MEDICINE

## 2019-02-22 PROCEDURE — 93005 ELECTROCARDIOGRAM TRACING: CPT | Performed by: EMERGENCY MEDICINE

## 2019-02-22 PROCEDURE — 25010000002 METHYLPREDNISOLONE PER 125 MG: Performed by: FAMILY MEDICINE

## 2019-02-22 PROCEDURE — 87804 INFLUENZA ASSAY W/OPTIC: CPT | Performed by: FAMILY MEDICINE

## 2019-02-22 PROCEDURE — 84443 ASSAY THYROID STIM HORMONE: CPT | Performed by: FAMILY MEDICINE

## 2019-02-22 PROCEDURE — 80053 COMPREHEN METABOLIC PANEL: CPT | Performed by: FAMILY MEDICINE

## 2019-02-22 PROCEDURE — 36415 COLL VENOUS BLD VENIPUNCTURE: CPT

## 2019-02-22 PROCEDURE — 99284 EMERGENCY DEPT VISIT MOD MDM: CPT

## 2019-02-22 RX ORDER — HYDROCODONE BITARTRATE AND ACETAMINOPHEN 10; 325 MG/1; MG/1
1 TABLET ORAL ONCE
Status: COMPLETED | OUTPATIENT
Start: 2019-02-22 | End: 2019-02-22

## 2019-02-22 RX ORDER — MAGNESIUM SULFATE HEPTAHYDRATE 40 MG/ML
2 INJECTION, SOLUTION INTRAVENOUS ONCE
Status: COMPLETED | OUTPATIENT
Start: 2019-02-22 | End: 2019-02-23

## 2019-02-22 RX ORDER — IPRATROPIUM BROMIDE AND ALBUTEROL SULFATE 2.5; .5 MG/3ML; MG/3ML
3 SOLUTION RESPIRATORY (INHALATION) ONCE
Status: COMPLETED | OUTPATIENT
Start: 2019-02-22 | End: 2019-02-22

## 2019-02-22 RX ORDER — METHYLPREDNISOLONE SODIUM SUCCINATE 125 MG/2ML
125 INJECTION, POWDER, LYOPHILIZED, FOR SOLUTION INTRAMUSCULAR; INTRAVENOUS ONCE
Status: COMPLETED | OUTPATIENT
Start: 2019-02-22 | End: 2019-02-22

## 2019-02-22 RX ADMIN — HYDROCODONE BITARTRATE AND ACETAMINOPHEN 1 TABLET: 10; 325 TABLET ORAL at 22:25

## 2019-02-22 RX ADMIN — METHYLPREDNISOLONE SODIUM SUCCINATE 125 MG: 125 INJECTION, POWDER, FOR SOLUTION INTRAMUSCULAR; INTRAVENOUS at 23:17

## 2019-02-22 RX ADMIN — MAGNESIUM SULFATE IN WATER 2 G: 40 INJECTION, SOLUTION INTRAVENOUS at 23:17

## 2019-02-22 RX ADMIN — SODIUM CHLORIDE 1000 ML: 9 INJECTION, SOLUTION INTRAVENOUS at 23:17

## 2019-02-22 RX ADMIN — IPRATROPIUM BROMIDE AND ALBUTEROL SULFATE 3 ML: .5; 3 SOLUTION RESPIRATORY (INHALATION) at 22:57

## 2019-02-23 ENCOUNTER — APPOINTMENT (OUTPATIENT)
Dept: CT IMAGING | Facility: HOSPITAL | Age: 44
End: 2019-02-23

## 2019-02-23 ENCOUNTER — APPOINTMENT (OUTPATIENT)
Dept: CARDIOLOGY | Facility: HOSPITAL | Age: 44
End: 2019-02-23

## 2019-02-23 LAB
ALBUMIN SERPL-MCNC: 4.2 G/DL (ref 3.5–5)
ALBUMIN/GLOB SERPL: 1.1 G/DL (ref 1.5–2.5)
ALP SERPL-CCNC: 81 U/L (ref 40–129)
ALT SERPL W P-5'-P-CCNC: 46 U/L (ref 10–44)
ANION GAP SERPL CALCULATED.3IONS-SCNC: 7.9 MMOL/L (ref 3.6–11.2)
AST SERPL-CCNC: 37 U/L (ref 10–34)
BACTERIA UR QL AUTO: ABNORMAL /HPF
BASOPHILS # BLD AUTO: 0.01 10*3/MM3 (ref 0–0.3)
BASOPHILS NFR BLD AUTO: 0.1 % (ref 0–2)
BILIRUB SERPL-MCNC: 0.3 MG/DL (ref 0.2–1.8)
BILIRUB UR QL STRIP: NEGATIVE
BUN BLD-MCNC: 18 MG/DL (ref 7–21)
BUN/CREAT SERPL: 20.9 (ref 7–25)
CALCIUM SPEC-SCNC: 9.6 MG/DL (ref 7.7–10)
CHLORIDE SERPL-SCNC: 108 MMOL/L (ref 99–112)
CLARITY UR: ABNORMAL
CO2 SERPL-SCNC: 25.1 MMOL/L (ref 24.3–31.9)
COLOR UR: YELLOW
CREAT BLD-MCNC: 0.86 MG/DL (ref 0.43–1.29)
CRP SERPL-MCNC: 3.62 MG/DL (ref 0–0.99)
D DIMER PPP FEU-MCNC: 0.55 MCGFEU/ML (ref 0–0.5)
D-LACTATE SERPL-SCNC: 1.1 MMOL/L (ref 0.5–2)
DEPRECATED RDW RBC AUTO: 50.4 FL (ref 37–54)
EOSINOPHIL # BLD AUTO: 0.12 10*3/MM3 (ref 0–0.7)
EOSINOPHIL NFR BLD AUTO: 1.1 % (ref 0–5)
ERYTHROCYTE [DISTWIDTH] IN BLOOD BY AUTOMATED COUNT: 16.9 % (ref 11.5–14.5)
GFR SERPL CREATININE-BSD FRML MDRD: 98 ML/MIN/1.73
GLOBULIN UR ELPH-MCNC: 3.8 GM/DL
GLUCOSE BLD-MCNC: 108 MG/DL (ref 70–110)
GLUCOSE BLDC GLUCOMTR-MCNC: 102 MG/DL (ref 70–130)
GLUCOSE BLDC GLUCOMTR-MCNC: 168 MG/DL (ref 70–130)
GLUCOSE BLDC GLUCOMTR-MCNC: 236 MG/DL (ref 70–130)
GLUCOSE BLDC GLUCOMTR-MCNC: 268 MG/DL (ref 70–130)
GLUCOSE BLDC GLUCOMTR-MCNC: 300 MG/DL (ref 70–130)
GLUCOSE UR STRIP-MCNC: ABNORMAL MG/DL
HCT VFR BLD AUTO: 39.4 % (ref 42–52)
HGB BLD-MCNC: 11.6 G/DL (ref 14–18)
HGB UR QL STRIP.AUTO: NEGATIVE
HOLD SPECIMEN: NORMAL
HYALINE CASTS UR QL AUTO: ABNORMAL /LPF
IMM GRANULOCYTES # BLD AUTO: 0.03 10*3/MM3 (ref 0–0.03)
IMM GRANULOCYTES NFR BLD AUTO: 0.3 % (ref 0–0.5)
KETONES UR QL STRIP: NEGATIVE
LEUKOCYTE ESTERASE UR QL STRIP.AUTO: NEGATIVE
LYMPHOCYTES # BLD AUTO: 1.17 10*3/MM3 (ref 1–3)
LYMPHOCYTES NFR BLD AUTO: 10.9 % (ref 21–51)
MAGNESIUM SERPL-MCNC: 2 MG/DL (ref 1.7–2.6)
MCH RBC QN AUTO: 24.2 PG (ref 27–33)
MCHC RBC AUTO-ENTMCNC: 29.4 G/DL (ref 33–37)
MCV RBC AUTO: 82.3 FL (ref 80–94)
MONOCYTES # BLD AUTO: 0.19 10*3/MM3 (ref 0.1–0.9)
MONOCYTES NFR BLD AUTO: 1.8 % (ref 0–10)
NEUTROPHILS # BLD AUTO: 9.24 10*3/MM3 (ref 1.4–6.5)
NEUTROPHILS NFR BLD AUTO: 85.8 % (ref 30–70)
NITRITE UR QL STRIP: POSITIVE
OSMOLALITY SERPL CALC.SUM OF ELEC: 283.7 MOSM/KG (ref 273–305)
PH UR STRIP.AUTO: >=9 [PH] (ref 5–8)
PLATELET # BLD AUTO: 460 10*3/MM3 (ref 130–400)
PMV BLD AUTO: 10.4 FL (ref 6–10)
POTASSIUM BLD-SCNC: 3.9 MMOL/L (ref 3.5–5.3)
PROT SERPL-MCNC: 8 G/DL (ref 6–8)
PROT UR QL STRIP: ABNORMAL
RBC # BLD AUTO: 4.79 10*6/MM3 (ref 4.7–6.1)
RBC # UR: ABNORMAL /HPF
REF LAB TEST METHOD: ABNORMAL
SODIUM BLD-SCNC: 141 MMOL/L (ref 135–153)
SP GR UR STRIP: 1.03 (ref 1–1.03)
SQUAMOUS #/AREA URNS HPF: ABNORMAL /HPF
TROPONIN I SERPL-MCNC: 0.01 NG/ML
TROPONIN I SERPL-MCNC: 0.02 NG/ML
UROBILINOGEN UR QL STRIP: ABNORMAL
WBC NRBC COR # BLD: 10.76 10*3/MM3 (ref 4.5–12.5)
WBC UR QL AUTO: ABNORMAL /HPF

## 2019-02-23 PROCEDURE — 94640 AIRWAY INHALATION TREATMENT: CPT

## 2019-02-23 PROCEDURE — 94660 CPAP INITIATION&MGMT: CPT

## 2019-02-23 PROCEDURE — 83735 ASSAY OF MAGNESIUM: CPT | Performed by: PHYSICIAN ASSISTANT

## 2019-02-23 PROCEDURE — 99222 1ST HOSP IP/OBS MODERATE 55: CPT | Performed by: INTERNAL MEDICINE

## 2019-02-23 PROCEDURE — 85025 COMPLETE CBC W/AUTO DIFF WBC: CPT | Performed by: PHYSICIAN ASSISTANT

## 2019-02-23 PROCEDURE — 71275 CT ANGIOGRAPHY CHEST: CPT | Performed by: RADIOLOGY

## 2019-02-23 PROCEDURE — 93306 TTE W/DOPPLER COMPLETE: CPT

## 2019-02-23 PROCEDURE — 93306 TTE W/DOPPLER COMPLETE: CPT | Performed by: INTERNAL MEDICINE

## 2019-02-23 PROCEDURE — G0378 HOSPITAL OBSERVATION PER HR: HCPCS

## 2019-02-23 PROCEDURE — 94799 UNLISTED PULMONARY SVC/PX: CPT

## 2019-02-23 PROCEDURE — 63710000001 INSULIN DETEMIR PER 5 UNITS: Performed by: INTERNAL MEDICINE

## 2019-02-23 PROCEDURE — 84484 ASSAY OF TROPONIN QUANT: CPT | Performed by: PHYSICIAN ASSISTANT

## 2019-02-23 PROCEDURE — 71275 CT ANGIOGRAPHY CHEST: CPT

## 2019-02-23 PROCEDURE — 80053 COMPREHEN METABOLIC PANEL: CPT | Performed by: PHYSICIAN ASSISTANT

## 2019-02-23 PROCEDURE — 93010 ELECTROCARDIOGRAM REPORT: CPT | Performed by: INTERNAL MEDICINE

## 2019-02-23 PROCEDURE — 0 IOPAMIDOL PER 1 ML: Performed by: INTERNAL MEDICINE

## 2019-02-23 PROCEDURE — 81001 URINALYSIS AUTO W/SCOPE: CPT | Performed by: PHYSICIAN ASSISTANT

## 2019-02-23 PROCEDURE — 86140 C-REACTIVE PROTEIN: CPT | Performed by: PHYSICIAN ASSISTANT

## 2019-02-23 PROCEDURE — 85379 FIBRIN DEGRADATION QUANT: CPT | Performed by: PHYSICIAN ASSISTANT

## 2019-02-23 PROCEDURE — 63710000001 INSULIN ASPART PER 5 UNITS: Performed by: PHYSICIAN ASSISTANT

## 2019-02-23 PROCEDURE — 93005 ELECTROCARDIOGRAM TRACING: CPT | Performed by: PHYSICIAN ASSISTANT

## 2019-02-23 PROCEDURE — 83605 ASSAY OF LACTIC ACID: CPT | Performed by: FAMILY MEDICINE

## 2019-02-23 PROCEDURE — 82962 GLUCOSE BLOOD TEST: CPT

## 2019-02-23 RX ORDER — FAMOTIDINE 20 MG/1
20 TABLET, FILM COATED ORAL 2 TIMES DAILY
Status: DISCONTINUED | OUTPATIENT
Start: 2019-02-23 | End: 2019-02-26 | Stop reason: HOSPADM

## 2019-02-23 RX ORDER — ATORVASTATIN CALCIUM 10 MG/1
10 TABLET, FILM COATED ORAL NIGHTLY
Status: DISCONTINUED | OUTPATIENT
Start: 2019-02-23 | End: 2019-02-26 | Stop reason: HOSPADM

## 2019-02-23 RX ORDER — IPRATROPIUM BROMIDE AND ALBUTEROL SULFATE 2.5; .5 MG/3ML; MG/3ML
3 SOLUTION RESPIRATORY (INHALATION) EVERY 4 HOURS PRN
Status: DISCONTINUED | OUTPATIENT
Start: 2019-02-23 | End: 2019-02-26 | Stop reason: HOSPADM

## 2019-02-23 RX ORDER — ACETAMINOPHEN 325 MG/1
650 TABLET ORAL EVERY 4 HOURS PRN
Status: DISCONTINUED | OUTPATIENT
Start: 2019-02-23 | End: 2019-02-26 | Stop reason: HOSPADM

## 2019-02-23 RX ORDER — NYSTATIN 100000 [USP'U]/G
1 POWDER TOPICAL 3 TIMES DAILY PRN
Status: DISCONTINUED | OUTPATIENT
Start: 2019-02-23 | End: 2019-02-26 | Stop reason: HOSPADM

## 2019-02-23 RX ORDER — MODAFINIL 100 MG/1
TABLET ORAL
Status: COMPLETED
Start: 2019-02-23 | End: 2019-02-23

## 2019-02-23 RX ORDER — BACLOFEN 10 MG/1
30 TABLET ORAL
Status: CANCELLED | OUTPATIENT
Start: 2019-02-23

## 2019-02-23 RX ORDER — ALBUTEROL SULFATE 2.5 MG/3ML
2.5 SOLUTION RESPIRATORY (INHALATION) EVERY 4 HOURS PRN
Status: DISCONTINUED | OUTPATIENT
Start: 2019-02-23 | End: 2019-02-26 | Stop reason: HOSPADM

## 2019-02-23 RX ORDER — ARIPIPRAZOLE 10 MG/1
10 TABLET ORAL NIGHTLY
Status: DISCONTINUED | OUTPATIENT
Start: 2019-02-23 | End: 2019-02-26 | Stop reason: HOSPADM

## 2019-02-23 RX ORDER — SODIUM CHLORIDE 0.9 % (FLUSH) 0.9 %
3-10 SYRINGE (ML) INJECTION AS NEEDED
Status: DISCONTINUED | OUTPATIENT
Start: 2019-02-23 | End: 2019-02-26 | Stop reason: HOSPADM

## 2019-02-23 RX ORDER — SODIUM CHLORIDE 0.9 % (FLUSH) 0.9 %
3 SYRINGE (ML) INJECTION EVERY 12 HOURS SCHEDULED
Status: DISCONTINUED | OUTPATIENT
Start: 2019-02-23 | End: 2019-02-26 | Stop reason: HOSPADM

## 2019-02-23 RX ORDER — NICOTINE POLACRILEX 4 MG
15 LOZENGE BUCCAL
Status: DISCONTINUED | OUTPATIENT
Start: 2019-02-23 | End: 2019-02-26 | Stop reason: HOSPADM

## 2019-02-23 RX ORDER — PANTOPRAZOLE SODIUM 40 MG/1
40 TABLET, DELAYED RELEASE ORAL EVERY MORNING
Status: DISCONTINUED | OUTPATIENT
Start: 2019-02-23 | End: 2019-02-25 | Stop reason: ALTCHOICE

## 2019-02-23 RX ORDER — MODAFINIL 100 MG/1
200 TABLET ORAL DAILY
Status: DISCONTINUED | OUTPATIENT
Start: 2019-02-23 | End: 2019-02-26 | Stop reason: HOSPADM

## 2019-02-23 RX ORDER — GABAPENTIN 400 MG/1
800 CAPSULE ORAL EVERY 8 HOURS SCHEDULED
Status: DISCONTINUED | OUTPATIENT
Start: 2019-02-23 | End: 2019-02-26 | Stop reason: HOSPADM

## 2019-02-23 RX ORDER — SUCRALFATE 1 G/1
1 TABLET ORAL NIGHTLY
Status: DISCONTINUED | OUTPATIENT
Start: 2019-02-23 | End: 2019-02-26 | Stop reason: HOSPADM

## 2019-02-23 RX ORDER — DULOXETIN HYDROCHLORIDE 60 MG/1
60 CAPSULE, DELAYED RELEASE ORAL DAILY
Status: DISCONTINUED | OUTPATIENT
Start: 2019-02-23 | End: 2019-02-26 | Stop reason: HOSPADM

## 2019-02-23 RX ORDER — METHENAMINE HIPPURATE 1000 MG/1
1 TABLET ORAL 2 TIMES DAILY
Status: DISCONTINUED | OUTPATIENT
Start: 2019-02-23 | End: 2019-02-26 | Stop reason: HOSPADM

## 2019-02-23 RX ORDER — METHYLPREDNISOLONE SODIUM SUCCINATE 125 MG/2ML
125 INJECTION, POWDER, LYOPHILIZED, FOR SOLUTION INTRAMUSCULAR; INTRAVENOUS ONCE
Status: DISCONTINUED | OUTPATIENT
Start: 2019-02-23 | End: 2019-02-23

## 2019-02-23 RX ORDER — CALCIUM CARBONATE 200(500)MG
2 TABLET,CHEWABLE ORAL 2 TIMES DAILY PRN
Status: DISCONTINUED | OUTPATIENT
Start: 2019-02-23 | End: 2019-02-26 | Stop reason: HOSPADM

## 2019-02-23 RX ORDER — BACLOFEN 10 MG/1
30 TABLET ORAL
Status: DISCONTINUED | OUTPATIENT
Start: 2019-02-23 | End: 2019-02-26 | Stop reason: HOSPADM

## 2019-02-23 RX ORDER — MODAFINIL 100 MG/1
200 TABLET ORAL DAILY
Status: DISCONTINUED | OUTPATIENT
Start: 2019-02-23 | End: 2019-02-23

## 2019-02-23 RX ORDER — NITROGLYCERIN 0.4 MG/1
0.4 TABLET SUBLINGUAL
Status: DISCONTINUED | OUTPATIENT
Start: 2019-02-23 | End: 2019-02-26 | Stop reason: HOSPADM

## 2019-02-23 RX ORDER — POTASSIUM CHLORIDE 750 MG/1
10 TABLET, FILM COATED, EXTENDED RELEASE ORAL DAILY
Status: DISCONTINUED | OUTPATIENT
Start: 2019-02-23 | End: 2019-02-26 | Stop reason: HOSPADM

## 2019-02-23 RX ORDER — DEXTROSE MONOHYDRATE 25 G/50ML
25 INJECTION, SOLUTION INTRAVENOUS
Status: DISCONTINUED | OUTPATIENT
Start: 2019-02-23 | End: 2019-02-26 | Stop reason: HOSPADM

## 2019-02-23 RX ORDER — ONDANSETRON 2 MG/ML
4 INJECTION INTRAMUSCULAR; INTRAVENOUS EVERY 6 HOURS PRN
Status: DISCONTINUED | OUTPATIENT
Start: 2019-02-23 | End: 2019-02-26 | Stop reason: HOSPADM

## 2019-02-23 RX ADMIN — IPRATROPIUM BROMIDE AND ALBUTEROL SULFATE 3 ML: .5; 3 SOLUTION RESPIRATORY (INHALATION) at 02:35

## 2019-02-23 RX ADMIN — FAMOTIDINE 20 MG: 20 TABLET, FILM COATED ORAL at 08:38

## 2019-02-23 RX ADMIN — APIXABAN 5 MG: 5 TABLET, FILM COATED ORAL at 08:38

## 2019-02-23 RX ADMIN — POTASSIUM CHLORIDE 10 MEQ: 750 TABLET, FILM COATED, EXTENDED RELEASE ORAL at 08:38

## 2019-02-23 RX ADMIN — GABAPENTIN 800 MG: 400 CAPSULE ORAL at 06:06

## 2019-02-23 RX ADMIN — MODAFINIL 200 MG: 100 TABLET ORAL at 10:36

## 2019-02-23 RX ADMIN — METHENAMINE HIPPURATE 1 G: 1 TABLET ORAL at 21:28

## 2019-02-23 RX ADMIN — ATORVASTATIN CALCIUM 10 MG: 10 TABLET, FILM COATED ORAL at 21:28

## 2019-02-23 RX ADMIN — INSULIN ASPART 3 UNITS: 100 INJECTION, SOLUTION INTRAVENOUS; SUBCUTANEOUS at 10:40

## 2019-02-23 RX ADMIN — INSULIN ASPART 5 UNITS: 100 INJECTION, SOLUTION INTRAVENOUS; SUBCUTANEOUS at 08:38

## 2019-02-23 RX ADMIN — CHOLECALCIFEROL CAP 125 MCG (5000 UNIT) 5000 UNITS: 125 CAP at 21:28

## 2019-02-23 RX ADMIN — IPRATROPIUM BROMIDE AND ALBUTEROL SULFATE 3 ML: .5; 3 SOLUTION RESPIRATORY (INHALATION) at 07:40

## 2019-02-23 RX ADMIN — INSULIN ASPART 3 UNITS: 100 INJECTION, SOLUTION INTRAVENOUS; SUBCUTANEOUS at 18:23

## 2019-02-23 RX ADMIN — BACLOFEN 30 MG: 10 TABLET ORAL at 18:23

## 2019-02-23 RX ADMIN — ARIPIPRAZOLE 10 MG: 10 TABLET ORAL at 21:29

## 2019-02-23 RX ADMIN — GABAPENTIN 800 MG: 400 CAPSULE ORAL at 15:44

## 2019-02-23 RX ADMIN — MAGNESIUM OXIDE 400 MG: 400 TABLET ORAL at 08:38

## 2019-02-23 RX ADMIN — IOPAMIDOL 80 ML: 755 INJECTION, SOLUTION INTRAVENOUS at 15:15

## 2019-02-23 RX ADMIN — PANTOPRAZOLE SODIUM 40 MG: 40 TABLET, DELAYED RELEASE ORAL at 06:06

## 2019-02-23 RX ADMIN — FAMOTIDINE 20 MG: 20 TABLET, FILM COATED ORAL at 21:29

## 2019-02-23 RX ADMIN — BACLOFEN 30 MG: 10 TABLET ORAL at 10:35

## 2019-02-23 RX ADMIN — METFORMIN HYDROCHLORIDE 1000 MG: 500 TABLET ORAL at 10:39

## 2019-02-23 RX ADMIN — INSULIN DETEMIR 20 UNITS: 100 INJECTION, SOLUTION SUBCUTANEOUS at 10:46

## 2019-02-23 RX ADMIN — GABAPENTIN 800 MG: 400 CAPSULE ORAL at 21:33

## 2019-02-23 RX ADMIN — IPRATROPIUM BROMIDE AND ALBUTEROL SULFATE 3 ML: .5; 3 SOLUTION RESPIRATORY (INHALATION) at 19:04

## 2019-02-23 RX ADMIN — AZTREONAM 1 G: 1 INJECTION, POWDER, FOR SOLUTION INTRAMUSCULAR; INTRAVENOUS at 21:30

## 2019-02-23 RX ADMIN — APIXABAN 5 MG: 5 TABLET, FILM COATED ORAL at 21:28

## 2019-02-23 RX ADMIN — AZTREONAM 1 G: 1 INJECTION, POWDER, FOR SOLUTION INTRAMUSCULAR; INTRAVENOUS at 15:44

## 2019-02-23 RX ADMIN — METHENAMINE HIPPURATE 1 G: 1 TABLET ORAL at 10:35

## 2019-02-23 RX ADMIN — SUCRALFATE 1 G: 1 TABLET ORAL at 21:29

## 2019-02-23 RX ADMIN — BACLOFEN 30 MG: 10 TABLET ORAL at 21:28

## 2019-02-23 RX ADMIN — DULOXETINE HYDROCHLORIDE 60 MG: 60 CAPSULE, DELAYED RELEASE ORAL at 08:38

## 2019-02-23 RX ADMIN — SODIUM CHLORIDE, PRESERVATIVE FREE 3 ML: 5 INJECTION INTRAVENOUS at 10:40

## 2019-02-24 LAB
BASOPHILS # BLD AUTO: 0.01 10*3/MM3 (ref 0–0.3)
BASOPHILS NFR BLD AUTO: 0.1 % (ref 0–2)
BH CV ECHO MEAS - % IVS THICK: 0.67 %
BH CV ECHO MEAS - % LVPW THICK: 61.7 %
BH CV ECHO MEAS - ACS: 1.9 CM
BH CV ECHO MEAS - AO ROOT AREA (BSA CORRECTED): 1.3
BH CV ECHO MEAS - AO ROOT AREA: 7.9 CM^2
BH CV ECHO MEAS - AO ROOT DIAM: 3.2 CM
BH CV ECHO MEAS - BSA(HAYCOCK): 2.6 M^2
BH CV ECHO MEAS - BSA: 2.5 M^2
BH CV ECHO MEAS - BZI_BMI: 40.7 KILOGRAMS/M^2
BH CV ECHO MEAS - BZI_METRIC_HEIGHT: 180.3 CM
BH CV ECHO MEAS - BZI_METRIC_WEIGHT: 132.5 KG
BH CV ECHO MEAS - EDV(CUBED): 164.3 ML
BH CV ECHO MEAS - EDV(TEICH): 146 ML
BH CV ECHO MEAS - EF(CUBED): 68.4 %
BH CV ECHO MEAS - EF(MOD-BP): 65 %
BH CV ECHO MEAS - EF(TEICH): 59.4 %
BH CV ECHO MEAS - ESV(CUBED): 52 ML
BH CV ECHO MEAS - ESV(TEICH): 59.3 ML
BH CV ECHO MEAS - FS: 31.9 %
BH CV ECHO MEAS - IVS/LVPW: 0.78
BH CV ECHO MEAS - IVSD: 1.2 CM
BH CV ECHO MEAS - IVSS: 1.3 CM
BH CV ECHO MEAS - LA DIMENSION: 3.5 CM
BH CV ECHO MEAS - LA/AO: 1.1
BH CV ECHO MEAS - LV MASS(C)D: 344.9 GRAMS
BH CV ECHO MEAS - LV MASS(C)DI: 139.2 GRAMS/M^2
BH CV ECHO MEAS - LV MASS(C)S: 320.5 GRAMS
BH CV ECHO MEAS - LV MASS(C)SI: 129.4 GRAMS/M^2
BH CV ECHO MEAS - LVIDD: 5.5 CM
BH CV ECHO MEAS - LVIDS: 3.7 CM
BH CV ECHO MEAS - LVOT AREA (M): 4.2 CM^2
BH CV ECHO MEAS - LVOT AREA: 4.3 CM^2
BH CV ECHO MEAS - LVOT DIAM: 2.3 CM
BH CV ECHO MEAS - LVPWD: 1.6 CM
BH CV ECHO MEAS - LVPWS: 2.6 CM
BH CV ECHO MEAS - MV A MAX VEL: 80.9 CM/SEC
BH CV ECHO MEAS - MV E MAX VEL: 80 CM/SEC
BH CV ECHO MEAS - MV E/A: 0.99
BH CV ECHO MEAS - PA ACC SLOPE: 1677 CM/SEC^2
BH CV ECHO MEAS - PA ACC TIME: 0.07 SEC
BH CV ECHO MEAS - PA PR(ACCEL): 47.3 MMHG
BH CV ECHO MEAS - RAP SYSTOLE: 10 MMHG
BH CV ECHO MEAS - RVSP: 24 MMHG
BH CV ECHO MEAS - SI(CUBED): 45.3 ML/M^2
BH CV ECHO MEAS - SI(TEICH): 35 ML/M^2
BH CV ECHO MEAS - SV(CUBED): 112.3 ML
BH CV ECHO MEAS - SV(TEICH): 86.7 ML
BH CV ECHO MEAS - TR MAX VEL: 187.3 CM/SEC
CRP SERPL-MCNC: 1.78 MG/DL (ref 0–0.99)
DEPRECATED RDW RBC AUTO: 50.4 FL (ref 37–54)
EOSINOPHIL # BLD AUTO: 0.09 10*3/MM3 (ref 0–0.7)
EOSINOPHIL NFR BLD AUTO: 0.9 % (ref 0–5)
ERYTHROCYTE [DISTWIDTH] IN BLOOD BY AUTOMATED COUNT: 16.8 % (ref 11.5–14.5)
GLUCOSE BLDC GLUCOMTR-MCNC: 148 MG/DL (ref 70–130)
GLUCOSE BLDC GLUCOMTR-MCNC: 149 MG/DL (ref 70–130)
GLUCOSE BLDC GLUCOMTR-MCNC: 228 MG/DL (ref 70–130)
GLUCOSE BLDC GLUCOMTR-MCNC: 240 MG/DL (ref 70–130)
HCT VFR BLD AUTO: 36.6 % (ref 42–52)
HGB BLD-MCNC: 11 G/DL (ref 14–18)
IMM GRANULOCYTES # BLD AUTO: 0.04 10*3/MM3 (ref 0–0.03)
IMM GRANULOCYTES NFR BLD AUTO: 0.4 % (ref 0–0.5)
LV EF 2D ECHO EST: 55 %
LYMPHOCYTES # BLD AUTO: 3.23 10*3/MM3 (ref 1–3)
LYMPHOCYTES NFR BLD AUTO: 32.7 % (ref 21–51)
MAGNESIUM SERPL-MCNC: 1.8 MG/DL (ref 1.7–2.6)
MAXIMAL PREDICTED HEART RATE: 179 BPM
MCH RBC QN AUTO: 24.4 PG (ref 27–33)
MCHC RBC AUTO-ENTMCNC: 30.1 G/DL (ref 33–37)
MCV RBC AUTO: 81.3 FL (ref 80–94)
MONOCYTES # BLD AUTO: 0.46 10*3/MM3 (ref 0.1–0.9)
MONOCYTES NFR BLD AUTO: 4.7 % (ref 0–10)
NEUTROPHILS # BLD AUTO: 6.06 10*3/MM3 (ref 1.4–6.5)
NEUTROPHILS NFR BLD AUTO: 61.2 % (ref 30–70)
PLATELET # BLD AUTO: 457 10*3/MM3 (ref 130–400)
PMV BLD AUTO: 10.4 FL (ref 6–10)
POTASSIUM BLD-SCNC: 4.1 MMOL/L (ref 3.5–5.3)
PSA SERPL-MCNC: 0.14 NG/ML (ref 0–4)
RBC # BLD AUTO: 4.5 10*6/MM3 (ref 4.7–6.1)
STRESS TARGET HR: 152 BPM
WBC NRBC COR # BLD: 9.89 10*3/MM3 (ref 4.5–12.5)

## 2019-02-24 PROCEDURE — 85025 COMPLETE CBC W/AUTO DIFF WBC: CPT | Performed by: PHYSICIAN ASSISTANT

## 2019-02-24 PROCEDURE — 25010000002 FUROSEMIDE PER 20 MG: Performed by: INTERNAL MEDICINE

## 2019-02-24 PROCEDURE — 86140 C-REACTIVE PROTEIN: CPT | Performed by: PHYSICIAN ASSISTANT

## 2019-02-24 PROCEDURE — 94799 UNLISTED PULMONARY SVC/PX: CPT

## 2019-02-24 PROCEDURE — 94660 CPAP INITIATION&MGMT: CPT

## 2019-02-24 PROCEDURE — 99232 SBSQ HOSP IP/OBS MODERATE 35: CPT | Performed by: INTERNAL MEDICINE

## 2019-02-24 PROCEDURE — 63710000001 INSULIN ASPART PER 5 UNITS: Performed by: PHYSICIAN ASSISTANT

## 2019-02-24 PROCEDURE — 84153 ASSAY OF PSA TOTAL: CPT | Performed by: PHYSICIAN ASSISTANT

## 2019-02-24 PROCEDURE — 63710000001 INSULIN DETEMIR PER 5 UNITS: Performed by: INTERNAL MEDICINE

## 2019-02-24 PROCEDURE — 82962 GLUCOSE BLOOD TEST: CPT

## 2019-02-24 PROCEDURE — 84132 ASSAY OF SERUM POTASSIUM: CPT | Performed by: PHYSICIAN ASSISTANT

## 2019-02-24 PROCEDURE — 83735 ASSAY OF MAGNESIUM: CPT | Performed by: NURSE PRACTITIONER

## 2019-02-24 RX ORDER — POTASSIUM CHLORIDE 7.45 MG/ML
10 INJECTION INTRAVENOUS
Status: DISCONTINUED | OUTPATIENT
Start: 2019-02-24 | End: 2019-02-26 | Stop reason: HOSPADM

## 2019-02-24 RX ORDER — MAGNESIUM SULFATE HEPTAHYDRATE 40 MG/ML
4 INJECTION, SOLUTION INTRAVENOUS AS NEEDED
Status: DISCONTINUED | OUTPATIENT
Start: 2019-02-24 | End: 2019-02-26 | Stop reason: HOSPADM

## 2019-02-24 RX ORDER — POTASSIUM CHLORIDE 20 MEQ/1
40 TABLET, EXTENDED RELEASE ORAL AS NEEDED
Status: DISCONTINUED | OUTPATIENT
Start: 2019-02-24 | End: 2019-02-26 | Stop reason: HOSPADM

## 2019-02-24 RX ORDER — FUROSEMIDE 10 MG/ML
40 INJECTION INTRAMUSCULAR; INTRAVENOUS ONCE
Status: COMPLETED | OUTPATIENT
Start: 2019-02-24 | End: 2019-02-24

## 2019-02-24 RX ORDER — MAGNESIUM SULFATE HEPTAHYDRATE 40 MG/ML
4 INJECTION, SOLUTION INTRAVENOUS ONCE
Status: COMPLETED | OUTPATIENT
Start: 2019-02-24 | End: 2019-02-24

## 2019-02-24 RX ORDER — POTASSIUM CHLORIDE 1.5 G/1.77G
40 POWDER, FOR SOLUTION ORAL AS NEEDED
Status: DISCONTINUED | OUTPATIENT
Start: 2019-02-24 | End: 2019-02-26 | Stop reason: HOSPADM

## 2019-02-24 RX ORDER — CARVEDILOL 6.25 MG/1
6.25 TABLET ORAL EVERY 12 HOURS SCHEDULED
Status: DISCONTINUED | OUTPATIENT
Start: 2019-02-24 | End: 2019-02-26 | Stop reason: HOSPADM

## 2019-02-24 RX ORDER — MAGNESIUM SULFATE HEPTAHYDRATE 40 MG/ML
2 INJECTION, SOLUTION INTRAVENOUS AS NEEDED
Status: DISCONTINUED | OUTPATIENT
Start: 2019-02-24 | End: 2019-02-26 | Stop reason: HOSPADM

## 2019-02-24 RX ADMIN — DULOXETINE HYDROCHLORIDE 60 MG: 60 CAPSULE, DELAYED RELEASE ORAL at 08:23

## 2019-02-24 RX ADMIN — AZTREONAM 1 G: 1 INJECTION, POWDER, FOR SOLUTION INTRAMUSCULAR; INTRAVENOUS at 13:42

## 2019-02-24 RX ADMIN — APIXABAN 5 MG: 5 TABLET, FILM COATED ORAL at 21:40

## 2019-02-24 RX ADMIN — PANTOPRAZOLE SODIUM 40 MG: 40 TABLET, DELAYED RELEASE ORAL at 06:02

## 2019-02-24 RX ADMIN — ATORVASTATIN CALCIUM 10 MG: 10 TABLET, FILM COATED ORAL at 21:40

## 2019-02-24 RX ADMIN — FAMOTIDINE 20 MG: 20 TABLET, FILM COATED ORAL at 21:40

## 2019-02-24 RX ADMIN — FAMOTIDINE 20 MG: 20 TABLET, FILM COATED ORAL at 08:23

## 2019-02-24 RX ADMIN — BACLOFEN 30 MG: 10 TABLET ORAL at 11:41

## 2019-02-24 RX ADMIN — POTASSIUM CHLORIDE 10 MEQ: 750 TABLET, FILM COATED, EXTENDED RELEASE ORAL at 08:23

## 2019-02-24 RX ADMIN — INSULIN ASPART 3 UNITS: 100 INJECTION, SOLUTION INTRAVENOUS; SUBCUTANEOUS at 21:40

## 2019-02-24 RX ADMIN — CHOLECALCIFEROL CAP 125 MCG (5000 UNIT) 5000 UNITS: 125 CAP at 21:40

## 2019-02-24 RX ADMIN — SUCRALFATE 1 G: 1 TABLET ORAL at 21:40

## 2019-02-24 RX ADMIN — AZTREONAM 1 G: 1 INJECTION, POWDER, FOR SOLUTION INTRAMUSCULAR; INTRAVENOUS at 06:02

## 2019-02-24 RX ADMIN — FUROSEMIDE 40 MG: 10 INJECTION, SOLUTION INTRAMUSCULAR; INTRAVENOUS at 13:42

## 2019-02-24 RX ADMIN — MAGNESIUM OXIDE 400 MG: 400 TABLET ORAL at 08:23

## 2019-02-24 RX ADMIN — CARVEDILOL 6.25 MG: 6.25 TABLET, FILM COATED ORAL at 21:40

## 2019-02-24 RX ADMIN — BACLOFEN 30 MG: 10 TABLET ORAL at 18:16

## 2019-02-24 RX ADMIN — MAGNESIUM SULFATE IN WATER 4 G: 40 INJECTION, SOLUTION INTRAVENOUS at 09:46

## 2019-02-24 RX ADMIN — GABAPENTIN 800 MG: 400 CAPSULE ORAL at 13:42

## 2019-02-24 RX ADMIN — INSULIN DETEMIR 20 UNITS: 100 INJECTION, SOLUTION SUBCUTANEOUS at 08:30

## 2019-02-24 RX ADMIN — ARIPIPRAZOLE 10 MG: 10 TABLET ORAL at 21:40

## 2019-02-24 RX ADMIN — INSULIN ASPART 3 UNITS: 100 INJECTION, SOLUTION INTRAVENOUS; SUBCUTANEOUS at 18:02

## 2019-02-24 RX ADMIN — SODIUM CHLORIDE, PRESERVATIVE FREE 3 ML: 5 INJECTION INTRAVENOUS at 08:30

## 2019-02-24 RX ADMIN — IPRATROPIUM BROMIDE AND ALBUTEROL SULFATE 3 ML: .5; 3 SOLUTION RESPIRATORY (INHALATION) at 18:19

## 2019-02-24 RX ADMIN — GABAPENTIN 800 MG: 400 CAPSULE ORAL at 06:02

## 2019-02-24 RX ADMIN — BACLOFEN 30 MG: 10 TABLET ORAL at 22:42

## 2019-02-24 RX ADMIN — BACLOFEN 30 MG: 10 TABLET ORAL at 08:23

## 2019-02-24 RX ADMIN — APIXABAN 5 MG: 5 TABLET, FILM COATED ORAL at 08:23

## 2019-02-24 RX ADMIN — GABAPENTIN 800 MG: 400 CAPSULE ORAL at 21:40

## 2019-02-24 RX ADMIN — MODAFINIL 200 MG: 100 TABLET ORAL at 08:23

## 2019-02-24 RX ADMIN — IPRATROPIUM BROMIDE AND ALBUTEROL SULFATE 3 ML: .5; 3 SOLUTION RESPIRATORY (INHALATION) at 07:30

## 2019-02-24 RX ADMIN — METHENAMINE HIPPURATE 1 G: 1 TABLET ORAL at 22:42

## 2019-02-24 RX ADMIN — SODIUM CHLORIDE, PRESERVATIVE FREE 3 ML: 5 INJECTION INTRAVENOUS at 22:43

## 2019-02-24 RX ADMIN — AZTREONAM 1 G: 1 INJECTION, POWDER, FOR SOLUTION INTRAMUSCULAR; INTRAVENOUS at 22:42

## 2019-02-25 LAB
ANION GAP SERPL CALCULATED.3IONS-SCNC: 7.4 MMOL/L (ref 3.6–11.2)
BACTERIA UR QL AUTO: ABNORMAL /HPF
BILIRUB UR QL STRIP: NEGATIVE
BUN BLD-MCNC: 20 MG/DL (ref 7–21)
BUN/CREAT SERPL: 24.1 (ref 7–25)
CALCIUM SPEC-SCNC: 9.1 MG/DL (ref 7.7–10)
CHLORIDE SERPL-SCNC: 108 MMOL/L (ref 99–112)
CLARITY UR: CLEAR
CO2 SERPL-SCNC: 26.6 MMOL/L (ref 24.3–31.9)
COLOR UR: YELLOW
CREAT BLD-MCNC: 0.83 MG/DL (ref 0.43–1.29)
GFR SERPL CREATININE-BSD FRML MDRD: 102 ML/MIN/1.73
GLUCOSE BLD-MCNC: 78 MG/DL (ref 70–110)
GLUCOSE BLDC GLUCOMTR-MCNC: 101 MG/DL (ref 70–130)
GLUCOSE BLDC GLUCOMTR-MCNC: 104 MG/DL (ref 70–130)
GLUCOSE BLDC GLUCOMTR-MCNC: 106 MG/DL (ref 70–130)
GLUCOSE BLDC GLUCOMTR-MCNC: 138 MG/DL (ref 70–130)
GLUCOSE UR STRIP-MCNC: NEGATIVE MG/DL
HGB UR QL STRIP.AUTO: ABNORMAL
HYALINE CASTS UR QL AUTO: ABNORMAL /LPF
KETONES UR QL STRIP: NEGATIVE
LEUKOCYTE ESTERASE UR QL STRIP.AUTO: NEGATIVE
NITRITE UR QL STRIP: NEGATIVE
OSMOLALITY SERPL CALC.SUM OF ELEC: 284.6 MOSM/KG (ref 273–305)
PH UR STRIP.AUTO: 6.5 [PH] (ref 5–8)
POTASSIUM BLD-SCNC: 4.2 MMOL/L (ref 3.5–5.3)
PROT UR QL STRIP: NEGATIVE
RBC # UR: ABNORMAL /HPF
REF LAB TEST METHOD: ABNORMAL
SODIUM BLD-SCNC: 142 MMOL/L (ref 135–153)
SP GR UR STRIP: 1.02 (ref 1–1.03)
SQUAMOUS #/AREA URNS HPF: ABNORMAL /HPF
TRANS CELLS #/AREA URNS HPF: ABNORMAL /HPF
UROBILINOGEN UR QL STRIP: ABNORMAL
WBC UR QL AUTO: ABNORMAL /HPF
YEAST URNS QL MICRO: ABNORMAL /HPF

## 2019-02-25 PROCEDURE — 81001 URINALYSIS AUTO W/SCOPE: CPT | Performed by: INTERNAL MEDICINE

## 2019-02-25 PROCEDURE — 25010000002 FUROSEMIDE PER 20 MG: Performed by: INTERNAL MEDICINE

## 2019-02-25 PROCEDURE — 82962 GLUCOSE BLOOD TEST: CPT

## 2019-02-25 PROCEDURE — 94799 UNLISTED PULMONARY SVC/PX: CPT

## 2019-02-25 PROCEDURE — 94660 CPAP INITIATION&MGMT: CPT

## 2019-02-25 PROCEDURE — 80048 BASIC METABOLIC PNL TOTAL CA: CPT | Performed by: PHYSICIAN ASSISTANT

## 2019-02-25 PROCEDURE — 99232 SBSQ HOSP IP/OBS MODERATE 35: CPT | Performed by: INTERNAL MEDICINE

## 2019-02-25 PROCEDURE — 63710000001 INSULIN DETEMIR PER 5 UNITS: Performed by: INTERNAL MEDICINE

## 2019-02-25 RX ORDER — FUROSEMIDE 10 MG/ML
40 INJECTION INTRAMUSCULAR; INTRAVENOUS DAILY
Status: DISCONTINUED | OUTPATIENT
Start: 2019-02-25 | End: 2019-02-26 | Stop reason: HOSPADM

## 2019-02-25 RX ORDER — L.ACID,PARA/B.BIFIDUM/S.THERM 8B CELL
1 CAPSULE ORAL DAILY
Status: DISCONTINUED | OUTPATIENT
Start: 2019-02-25 | End: 2019-02-26 | Stop reason: HOSPADM

## 2019-02-25 RX ADMIN — BACLOFEN 30 MG: 10 TABLET ORAL at 21:26

## 2019-02-25 RX ADMIN — GABAPENTIN 800 MG: 400 CAPSULE ORAL at 21:46

## 2019-02-25 RX ADMIN — ARIPIPRAZOLE 10 MG: 10 TABLET ORAL at 21:26

## 2019-02-25 RX ADMIN — FAMOTIDINE 20 MG: 20 TABLET, FILM COATED ORAL at 08:47

## 2019-02-25 RX ADMIN — SUCRALFATE 1 G: 1 TABLET ORAL at 21:26

## 2019-02-25 RX ADMIN — AZTREONAM 1 G: 1 INJECTION, POWDER, FOR SOLUTION INTRAMUSCULAR; INTRAVENOUS at 08:50

## 2019-02-25 RX ADMIN — MAGNESIUM OXIDE 400 MG: 400 TABLET ORAL at 08:50

## 2019-02-25 RX ADMIN — FUROSEMIDE 40 MG: 10 INJECTION, SOLUTION INTRAMUSCULAR; INTRAVENOUS at 14:45

## 2019-02-25 RX ADMIN — POTASSIUM CHLORIDE 10 MEQ: 750 TABLET, FILM COATED, EXTENDED RELEASE ORAL at 08:46

## 2019-02-25 RX ADMIN — ATORVASTATIN CALCIUM 10 MG: 10 TABLET, FILM COATED ORAL at 21:26

## 2019-02-25 RX ADMIN — BACLOFEN 30 MG: 10 TABLET ORAL at 17:41

## 2019-02-25 RX ADMIN — GABAPENTIN 800 MG: 400 CAPSULE ORAL at 05:37

## 2019-02-25 RX ADMIN — METHENAMINE HIPPURATE 1 G: 1 TABLET ORAL at 23:50

## 2019-02-25 RX ADMIN — FAMOTIDINE 20 MG: 20 TABLET, FILM COATED ORAL at 21:26

## 2019-02-25 RX ADMIN — CARVEDILOL 6.25 MG: 6.25 TABLET, FILM COATED ORAL at 08:47

## 2019-02-25 RX ADMIN — DULOXETINE HYDROCHLORIDE 60 MG: 60 CAPSULE, DELAYED RELEASE ORAL at 08:47

## 2019-02-25 RX ADMIN — IPRATROPIUM BROMIDE AND ALBUTEROL SULFATE 3 ML: .5; 3 SOLUTION RESPIRATORY (INHALATION) at 19:16

## 2019-02-25 RX ADMIN — MODAFINIL 200 MG: 100 TABLET ORAL at 08:46

## 2019-02-25 RX ADMIN — BACLOFEN 30 MG: 10 TABLET ORAL at 12:29

## 2019-02-25 RX ADMIN — CHOLECALCIFEROL CAP 125 MCG (5000 UNIT) 5000 UNITS: 125 CAP at 21:26

## 2019-02-25 RX ADMIN — Medication 1 CAPSULE: at 12:29

## 2019-02-25 RX ADMIN — INSULIN DETEMIR 20 UNITS: 100 INJECTION, SOLUTION SUBCUTANEOUS at 08:48

## 2019-02-25 RX ADMIN — GABAPENTIN 800 MG: 400 CAPSULE ORAL at 14:45

## 2019-02-25 RX ADMIN — AZTREONAM 1 G: 1 INJECTION, POWDER, FOR SOLUTION INTRAMUSCULAR; INTRAVENOUS at 15:30

## 2019-02-25 RX ADMIN — METHENAMINE HIPPURATE 1 G: 1 TABLET ORAL at 08:46

## 2019-02-25 RX ADMIN — APIXABAN 5 MG: 5 TABLET, FILM COATED ORAL at 21:26

## 2019-02-25 RX ADMIN — SODIUM CHLORIDE, PRESERVATIVE FREE 3 ML: 5 INJECTION INTRAVENOUS at 08:47

## 2019-02-25 RX ADMIN — BACLOFEN 30 MG: 10 TABLET ORAL at 08:47

## 2019-02-25 RX ADMIN — PANTOPRAZOLE SODIUM 40 MG: 40 TABLET, DELAYED RELEASE ORAL at 07:32

## 2019-02-25 RX ADMIN — APIXABAN 5 MG: 5 TABLET, FILM COATED ORAL at 08:50

## 2019-02-26 VITALS
OXYGEN SATURATION: 95 % | HEART RATE: 91 BPM | SYSTOLIC BLOOD PRESSURE: 113 MMHG | BODY MASS INDEX: 40.79 KG/M2 | RESPIRATION RATE: 20 BRPM | HEIGHT: 71 IN | WEIGHT: 291.38 LBS | DIASTOLIC BLOOD PRESSURE: 76 MMHG | TEMPERATURE: 97.6 F

## 2019-02-26 LAB
ANION GAP SERPL CALCULATED.3IONS-SCNC: 12.1 MMOL/L (ref 3.6–11.2)
BUN BLD-MCNC: 24 MG/DL (ref 7–21)
BUN/CREAT SERPL: 25.8 (ref 7–25)
CALCIUM SPEC-SCNC: 9.5 MG/DL (ref 7.7–10)
CHLORIDE SERPL-SCNC: 104 MMOL/L (ref 99–112)
CO2 SERPL-SCNC: 20.9 MMOL/L (ref 24.3–31.9)
CREAT BLD-MCNC: 0.93 MG/DL (ref 0.43–1.29)
GFR SERPL CREATININE-BSD FRML MDRD: 90 ML/MIN/1.73
GLUCOSE BLD-MCNC: 101 MG/DL (ref 70–110)
GLUCOSE BLDC GLUCOMTR-MCNC: 117 MG/DL (ref 70–130)
GLUCOSE BLDC GLUCOMTR-MCNC: 118 MG/DL (ref 70–130)
OSMOLALITY SERPL CALC.SUM OF ELEC: 278 MOSM/KG (ref 273–305)
POTASSIUM BLD-SCNC: 3.9 MMOL/L (ref 3.5–5.3)
SODIUM BLD-SCNC: 137 MMOL/L (ref 135–153)

## 2019-02-26 PROCEDURE — 25010000002 FUROSEMIDE PER 20 MG: Performed by: INTERNAL MEDICINE

## 2019-02-26 PROCEDURE — 82962 GLUCOSE BLOOD TEST: CPT

## 2019-02-26 PROCEDURE — 63710000001 INSULIN DETEMIR PER 5 UNITS: Performed by: INTERNAL MEDICINE

## 2019-02-26 PROCEDURE — 99239 HOSP IP/OBS DSCHRG MGMT >30: CPT | Performed by: INTERNAL MEDICINE

## 2019-02-26 PROCEDURE — 94799 UNLISTED PULMONARY SVC/PX: CPT

## 2019-02-26 PROCEDURE — 80048 BASIC METABOLIC PNL TOTAL CA: CPT | Performed by: INTERNAL MEDICINE

## 2019-02-26 RX ORDER — CARVEDILOL 6.25 MG/1
6.25 TABLET ORAL EVERY 12 HOURS SCHEDULED
Qty: 60 TABLET | Refills: 0 | OUTPATIENT
Start: 2019-02-26 | End: 2021-06-14

## 2019-02-26 RX ORDER — POTASSIUM CHLORIDE 20 MEQ/1
40 TABLET, EXTENDED RELEASE ORAL DAILY
Qty: 60 TABLET | Refills: 0 | Status: SHIPPED | OUTPATIENT
Start: 2019-02-26 | End: 2019-03-28

## 2019-02-26 RX ORDER — FUROSEMIDE 40 MG/1
40 TABLET ORAL DAILY
Qty: 30 TABLET | Refills: 0 | OUTPATIENT
Start: 2019-02-26 | End: 2022-03-24 | Stop reason: HOSPADM

## 2019-02-26 RX ORDER — INSULIN GLARGINE 100 [IU]/ML
20 INJECTION, SOLUTION SUBCUTANEOUS DAILY
Refills: 12 | Status: ON HOLD
Start: 2019-02-26 | End: 2019-10-19

## 2019-02-26 RX ADMIN — BACLOFEN 30 MG: 10 TABLET ORAL at 12:41

## 2019-02-26 RX ADMIN — MAGNESIUM OXIDE 400 MG: 400 TABLET ORAL at 08:26

## 2019-02-26 RX ADMIN — POTASSIUM CHLORIDE 10 MEQ: 750 TABLET, FILM COATED, EXTENDED RELEASE ORAL at 08:26

## 2019-02-26 RX ADMIN — FUROSEMIDE 40 MG: 10 INJECTION, SOLUTION INTRAMUSCULAR; INTRAVENOUS at 08:27

## 2019-02-26 RX ADMIN — FAMOTIDINE 20 MG: 20 TABLET, FILM COATED ORAL at 08:26

## 2019-02-26 RX ADMIN — ACETAMINOPHEN 650 MG: 325 TABLET, FILM COATED ORAL at 03:41

## 2019-02-26 RX ADMIN — METFORMIN HYDROCHLORIDE 1000 MG: 500 TABLET ORAL at 08:26

## 2019-02-26 RX ADMIN — DULOXETINE HYDROCHLORIDE 60 MG: 60 CAPSULE, DELAYED RELEASE ORAL at 08:26

## 2019-02-26 RX ADMIN — INSULIN DETEMIR 20 UNITS: 100 INJECTION, SOLUTION SUBCUTANEOUS at 08:26

## 2019-02-26 RX ADMIN — METHENAMINE HIPPURATE 1 G: 1 TABLET ORAL at 08:26

## 2019-02-26 RX ADMIN — APIXABAN 5 MG: 5 TABLET, FILM COATED ORAL at 08:25

## 2019-02-26 RX ADMIN — GABAPENTIN 800 MG: 400 CAPSULE ORAL at 05:04

## 2019-02-26 RX ADMIN — AZTREONAM 1 G: 1 INJECTION, POWDER, FOR SOLUTION INTRAMUSCULAR; INTRAVENOUS at 08:35

## 2019-02-26 RX ADMIN — BACLOFEN 30 MG: 10 TABLET ORAL at 08:25

## 2019-02-26 RX ADMIN — GABAPENTIN 800 MG: 400 CAPSULE ORAL at 15:16

## 2019-02-26 RX ADMIN — SODIUM CHLORIDE, PRESERVATIVE FREE 3 ML: 5 INJECTION INTRAVENOUS at 08:35

## 2019-02-26 RX ADMIN — AZTREONAM 1 G: 1 INJECTION, POWDER, FOR SOLUTION INTRAMUSCULAR; INTRAVENOUS at 00:56

## 2019-02-26 RX ADMIN — Medication 1 CAPSULE: at 08:27

## 2019-02-26 RX ADMIN — MODAFINIL 200 MG: 100 TABLET ORAL at 08:27

## 2019-02-27 ENCOUNTER — READMISSION MANAGEMENT (OUTPATIENT)
Dept: CALL CENTER | Facility: HOSPITAL | Age: 44
End: 2019-02-27
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
## 2019-02-28 ENCOUNTER — READMISSION MANAGEMENT (OUTPATIENT)
Dept: CALL CENTER | Facility: HOSPITAL | Age: 44
End: 2019-02-28
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
## 2019-03-07 ENCOUNTER — READMISSION MANAGEMENT (OUTPATIENT)
Dept: CALL CENTER | Facility: HOSPITAL | Age: 44
End: 2019-03-07

## 2019-03-07 NOTE — OUTREACH NOTE
Sepsis Week 2 Survey      Responses   Facility patient discharged from?  [facility]   Does the patient have one of the following disease processes/diagnoses(primary or secondary)?  Sepsis   Week 2 attempt successful?  No   Unsuccessful attempts  Attempt 1          Bala Glasgow RN

## 2019-03-08 ENCOUNTER — READMISSION MANAGEMENT (OUTPATIENT)
Dept: CALL CENTER | Facility: HOSPITAL | Age: 44
End: 2019-03-08

## 2019-03-08 NOTE — OUTREACH NOTE
Sepsis Week 2 Survey      Responses   Facility patient discharged from?  [facility]   Does the patient have one of the following disease processes/diagnoses(primary or secondary)?  Sepsis   Week 2 attempt successful?  No   Unsuccessful attempts  Attempt 2          Miguel Alexandre RN

## 2019-03-15 ENCOUNTER — READMISSION MANAGEMENT (OUTPATIENT)
Dept: CALL CENTER | Facility: HOSPITAL | Age: 44
End: 2019-03-15
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
## 2019-03-22 ENCOUNTER — READMISSION MANAGEMENT (OUTPATIENT)
Dept: CALL CENTER | Facility: HOSPITAL | Age: 44
End: 2019-03-22
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
## 2019-07-13 ENCOUNTER — HOSPITAL ENCOUNTER (EMERGENCY)
Facility: HOSPITAL | Age: 44
Discharge: HOME | End: 2019-07-13
Attending: EMERGENCY MEDICINE | Admitting: FAMILY MEDICINE

## 2019-07-13 VITALS
RESPIRATION RATE: 20 BRPM | HEART RATE: 84 BPM | OXYGEN SATURATION: 97 % | TEMPERATURE: 98.4 F | DIASTOLIC BLOOD PRESSURE: 68 MMHG | BODY MASS INDEX: 43.4 KG/M2 | SYSTOLIC BLOOD PRESSURE: 116 MMHG | WEIGHT: 310 LBS | HEIGHT: 71 IN

## 2019-07-13 DIAGNOSIS — T83.511A URINARY TRACT INFECTION ASSOCIATED WITH INDWELLING URETHRAL CATHETER, INITIAL ENCOUNTER (HCC): Primary | ICD-10-CM

## 2019-07-13 DIAGNOSIS — N39.0 URINARY TRACT INFECTION ASSOCIATED WITH INDWELLING URETHRAL CATHETER, INITIAL ENCOUNTER (HCC): Primary | ICD-10-CM

## 2019-07-13 LAB
ALBUMIN SERPL-MCNC: 3.33 G/DL (ref 3.5–5.2)
ALBUMIN/GLOB SERPL: 0.6 G/DL
ALP SERPL-CCNC: 98 U/L (ref 39–117)
ALT SERPL W P-5'-P-CCNC: 51 U/L (ref 1–41)
ANION GAP SERPL CALCULATED.3IONS-SCNC: 13.4 MMOL/L (ref 5–15)
AST SERPL-CCNC: 85 U/L (ref 1–40)
BACTERIA UR QL AUTO: ABNORMAL /HPF
BASOPHILS # BLD AUTO: 0.03 10*3/MM3 (ref 0–0.2)
BASOPHILS NFR BLD AUTO: 0.3 % (ref 0–1.5)
BILIRUB SERPL-MCNC: 0.3 MG/DL (ref 0.2–1.2)
BILIRUB UR QL STRIP: NEGATIVE
BUN BLD-MCNC: 17 MG/DL (ref 6–20)
BUN/CREAT SERPL: 21.3 (ref 7–25)
CALCIUM SPEC-SCNC: 9.4 MG/DL (ref 8.6–10.5)
CHLORIDE SERPL-SCNC: 105 MMOL/L (ref 98–107)
CLARITY UR: ABNORMAL
CO2 SERPL-SCNC: 23.6 MMOL/L (ref 22–29)
COLOR UR: ABNORMAL
CREAT BLD-MCNC: 0.8 MG/DL (ref 0.76–1.27)
CRP SERPL-MCNC: 4.26 MG/DL (ref 0–0.5)
D-LACTATE SERPL-SCNC: 2 MMOL/L (ref 0.5–2)
DEPRECATED RDW RBC AUTO: 46.4 FL (ref 37–54)
EOSINOPHIL # BLD AUTO: 0.38 10*3/MM3 (ref 0–0.4)
EOSINOPHIL NFR BLD AUTO: 4 % (ref 0.3–6.2)
ERYTHROCYTE [DISTWIDTH] IN BLOOD BY AUTOMATED COUNT: 15.9 % (ref 12.3–15.4)
GFR SERPL CREATININE-BSD FRML MDRD: 107 ML/MIN/1.73
GLOBULIN UR ELPH-MCNC: 5.3 GM/DL
GLUCOSE BLD-MCNC: 129 MG/DL (ref 65–99)
GLUCOSE UR STRIP-MCNC: NEGATIVE MG/DL
HCT VFR BLD AUTO: 37.7 % (ref 37.5–51)
HGB BLD-MCNC: 11 G/DL (ref 13–17.7)
HGB UR QL STRIP.AUTO: ABNORMAL
HYALINE CASTS UR QL AUTO: ABNORMAL /LPF
IMM GRANULOCYTES # BLD AUTO: 0.04 10*3/MM3 (ref 0–0.05)
IMM GRANULOCYTES NFR BLD AUTO: 0.4 % (ref 0–0.5)
KETONES UR QL STRIP: NEGATIVE
LEUKOCYTE ESTERASE UR QL STRIP.AUTO: ABNORMAL
LYMPHOCYTES # BLD AUTO: 2.25 10*3/MM3 (ref 0.7–3.1)
LYMPHOCYTES NFR BLD AUTO: 23.4 % (ref 19.6–45.3)
MCH RBC QN AUTO: 23.6 PG (ref 26.6–33)
MCHC RBC AUTO-ENTMCNC: 29.2 G/DL (ref 31.5–35.7)
MCV RBC AUTO: 80.9 FL (ref 79–97)
MONOCYTES # BLD AUTO: 0.32 10*3/MM3 (ref 0.1–0.9)
MONOCYTES NFR BLD AUTO: 3.3 % (ref 5–12)
NEUTROPHILS # BLD AUTO: 6.6 10*3/MM3 (ref 1.7–7)
NEUTROPHILS NFR BLD AUTO: 68.6 % (ref 42.7–76)
NITRITE UR QL STRIP: NEGATIVE
PH UR STRIP.AUTO: 6.5 [PH] (ref 5–8)
PLATELET # BLD AUTO: 551 10*3/MM3 (ref 140–450)
PMV BLD AUTO: 9.3 FL (ref 6–12)
POTASSIUM BLD-SCNC: 3.8 MMOL/L (ref 3.5–5.2)
PROT SERPL-MCNC: 8.6 G/DL (ref 6–8.5)
PROT UR QL STRIP: ABNORMAL
RBC # BLD AUTO: 4.66 10*6/MM3 (ref 4.14–5.8)
RBC # UR: ABNORMAL /HPF
REF LAB TEST METHOD: ABNORMAL
SODIUM BLD-SCNC: 142 MMOL/L (ref 136–145)
SP GR UR STRIP: 1.02 (ref 1–1.03)
SQUAMOUS #/AREA URNS HPF: ABNORMAL /HPF
UROBILINOGEN UR QL STRIP: ABNORMAL
WBC NRBC COR # BLD: 9.62 10*3/MM3 (ref 3.4–10.8)
WBC UR QL AUTO: ABNORMAL /HPF

## 2019-07-13 PROCEDURE — 86140 C-REACTIVE PROTEIN: CPT | Performed by: PHYSICIAN ASSISTANT

## 2019-07-13 PROCEDURE — 87086 URINE CULTURE/COLONY COUNT: CPT | Performed by: NURSE PRACTITIONER

## 2019-07-13 PROCEDURE — 85025 COMPLETE CBC W/AUTO DIFF WBC: CPT | Performed by: PHYSICIAN ASSISTANT

## 2019-07-13 PROCEDURE — 80053 COMPREHEN METABOLIC PANEL: CPT | Performed by: PHYSICIAN ASSISTANT

## 2019-07-13 PROCEDURE — 81001 URINALYSIS AUTO W/SCOPE: CPT | Performed by: PHYSICIAN ASSISTANT

## 2019-07-13 PROCEDURE — 87040 BLOOD CULTURE FOR BACTERIA: CPT | Performed by: PHYSICIAN ASSISTANT

## 2019-07-13 PROCEDURE — 83605 ASSAY OF LACTIC ACID: CPT | Performed by: PHYSICIAN ASSISTANT

## 2019-07-13 PROCEDURE — 99283 EMERGENCY DEPT VISIT LOW MDM: CPT

## 2019-07-13 RX ORDER — SULFAMETHOXAZOLE AND TRIMETHOPRIM 800; 160 MG/1; MG/1
1 TABLET ORAL ONCE
Status: COMPLETED | OUTPATIENT
Start: 2019-07-13 | End: 2019-07-13

## 2019-07-13 RX ORDER — LEVOFLOXACIN 500 MG/1
500 TABLET, FILM COATED ORAL DAILY
COMMUNITY
End: 2019-07-13 | Stop reason: HOSPADM

## 2019-07-13 RX ORDER — SULFAMETHOXAZOLE AND TRIMETHOPRIM 800; 160 MG/1; MG/1
1 TABLET ORAL 2 TIMES DAILY
Qty: 14 TABLET | Refills: 0 | Status: SHIPPED | OUTPATIENT
Start: 2019-07-13 | End: 2019-10-19

## 2019-07-13 RX ADMIN — SULFAMETHOXAZOLE AND TRIMETHOPRIM 160 MG: 800; 160 TABLET ORAL at 20:46
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
## 2019-07-14 LAB — BACTERIA SPEC AEROBE CULT: NORMAL

## 2019-07-14 NOTE — DISCHARGE INSTRUCTIONS
Call one of the offices below to establish a primary care provider.  If you are unable to get an appointment and feel it is an emergency and need to be seen immediately please return to the Emergency Department.    Call one of the office below to set up a primary care provider.    Dr. Ministerio Giron                                                                                                       [address] [facility] [address]  [phone]    Dr. Flores, Dr. ZEYAD Chiu, Dr. JUAN Chiu ([facility])  [address] [facility] [address]  [phone]    Dr. Vick, Dr. Quiles, Dr. Hart ([facility])  [address] [facility] [address]  [phone]    Dr. Strickland  [address] [facility] [address]  [phone]    Dr. Queen, Dr. Frazier, Dr. Adhikari, Dr. Harris ([facility])  [address] [place] [address] [address]  [facility] [address]  [phone]    Dr. Darlene Beltrán  [address] [facility] [place] [address]  [phone]    Dr. Mirela Villanueva  [address]   [address]  [facility] [address]  [phone]    Dr. Giron  [address] [facility] [address]  [phone]    Dr. Freedman, Dr. Chris  [address] [facility] [place] [address]  [phone]    Dr. Turner  [address]                                                              [address]  [facility] [address]  [phone]    Dr. Humphrey  [address] [facility] [id number] [phone]    Dr. Gabi Cuellar  [address]  [address]  [facility] [address]  [phone]
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
## 2019-07-18 LAB
BACTERIA SPEC AEROBE CULT: NORMAL
BACTERIA SPEC AEROBE CULT: NORMAL

## 2019-07-27 ENCOUNTER — HOSPITAL ENCOUNTER (EMERGENCY)
Facility: HOSPITAL | Age: 44
Discharge: HOME | End: 2019-07-27
Attending: EMERGENCY MEDICINE | Admitting: EMERGENCY MEDICINE

## 2019-07-27 ENCOUNTER — APPOINTMENT (OUTPATIENT)
Dept: CT IMAGING | Facility: HOSPITAL | Age: 44
End: 2019-07-27

## 2019-07-27 VITALS
HEIGHT: 71 IN | WEIGHT: 310 LBS | BODY MASS INDEX: 43.4 KG/M2 | OXYGEN SATURATION: 98 % | SYSTOLIC BLOOD PRESSURE: 138 MMHG | HEART RATE: 82 BPM | TEMPERATURE: 98.7 F | DIASTOLIC BLOOD PRESSURE: 76 MMHG | RESPIRATION RATE: 18 BRPM

## 2019-07-27 DIAGNOSIS — L89.159 PRESSURE INJURY OF SKIN OF SACRAL REGION, UNSPECIFIED INJURY STAGE: ICD-10-CM

## 2019-07-27 DIAGNOSIS — N28.89 URETERITIS: Primary | ICD-10-CM

## 2019-07-27 LAB
ALBUMIN SERPL-MCNC: 3.72 G/DL (ref 3.5–5.2)
ALBUMIN/GLOB SERPL: 0.7 G/DL
ALP SERPL-CCNC: 79 U/L (ref 39–117)
ALT SERPL W P-5'-P-CCNC: 39 U/L (ref 1–41)
ANION GAP SERPL CALCULATED.3IONS-SCNC: 14.4 MMOL/L (ref 5–15)
AST SERPL-CCNC: 46 U/L (ref 1–40)
BACTERIA UR QL AUTO: ABNORMAL /HPF
BASOPHILS # BLD AUTO: 0.02 10*3/MM3 (ref 0–0.2)
BASOPHILS NFR BLD AUTO: 0.2 % (ref 0–1.5)
BILIRUB SERPL-MCNC: 0.3 MG/DL (ref 0.2–1.2)
BILIRUB UR QL STRIP: NEGATIVE
BUN BLD-MCNC: 14 MG/DL (ref 6–20)
BUN/CREAT SERPL: 15.4 (ref 7–25)
CALCIUM SPEC-SCNC: 10 MG/DL (ref 8.6–10.5)
CHLORIDE SERPL-SCNC: 100 MMOL/L (ref 98–107)
CLARITY UR: ABNORMAL
CO2 SERPL-SCNC: 24.6 MMOL/L (ref 22–29)
COLOR UR: YELLOW
CREAT BLD-MCNC: 0.91 MG/DL (ref 0.76–1.27)
CRP SERPL-MCNC: 4 MG/DL (ref 0–0.5)
D-LACTATE SERPL-SCNC: 1.7 MMOL/L (ref 0.5–2)
DEPRECATED RDW RBC AUTO: 49.2 FL (ref 37–54)
EOSINOPHIL # BLD AUTO: 0.2 10*3/MM3 (ref 0–0.4)
EOSINOPHIL NFR BLD AUTO: 1.9 % (ref 0.3–6.2)
ERYTHROCYTE [DISTWIDTH] IN BLOOD BY AUTOMATED COUNT: 16.4 % (ref 12.3–15.4)
GFR SERPL CREATININE-BSD FRML MDRD: 92 ML/MIN/1.73
GLOBULIN UR ELPH-MCNC: 5.3 GM/DL
GLUCOSE BLD-MCNC: 242 MG/DL (ref 65–99)
GLUCOSE UR STRIP-MCNC: NEGATIVE MG/DL
HCT VFR BLD AUTO: 40.4 % (ref 37.5–51)
HGB BLD-MCNC: 11.9 G/DL (ref 13–17.7)
HGB UR QL STRIP.AUTO: ABNORMAL
HYALINE CASTS UR QL AUTO: ABNORMAL /LPF
IMM GRANULOCYTES # BLD AUTO: 0.02 10*3/MM3 (ref 0–0.05)
IMM GRANULOCYTES NFR BLD AUTO: 0.2 % (ref 0–0.5)
KETONES UR QL STRIP: NEGATIVE
LEUKOCYTE ESTERASE UR QL STRIP.AUTO: NEGATIVE
LYMPHOCYTES # BLD AUTO: 2.5 10*3/MM3 (ref 0.7–3.1)
LYMPHOCYTES NFR BLD AUTO: 24.1 % (ref 19.6–45.3)
MCH RBC QN AUTO: 24.3 PG (ref 26.6–33)
MCHC RBC AUTO-ENTMCNC: 29.5 G/DL (ref 31.5–35.7)
MCV RBC AUTO: 82.4 FL (ref 79–97)
MONOCYTES # BLD AUTO: 0.27 10*3/MM3 (ref 0.1–0.9)
MONOCYTES NFR BLD AUTO: 2.6 % (ref 5–12)
NEUTROPHILS # BLD AUTO: 7.36 10*3/MM3 (ref 1.7–7)
NEUTROPHILS NFR BLD AUTO: 71 % (ref 42.7–76)
NITRITE UR QL STRIP: NEGATIVE
PH UR STRIP.AUTO: 5.5 [PH] (ref 5–8)
PLATELET # BLD AUTO: 521 10*3/MM3 (ref 140–450)
PMV BLD AUTO: 10.5 FL (ref 6–12)
POTASSIUM BLD-SCNC: 4.2 MMOL/L (ref 3.5–5.2)
PROT SERPL-MCNC: 9 G/DL (ref 6–8.5)
PROT UR QL STRIP: ABNORMAL
RBC # BLD AUTO: 4.9 10*6/MM3 (ref 4.14–5.8)
RBC # UR: ABNORMAL /HPF
REF LAB TEST METHOD: ABNORMAL
SODIUM BLD-SCNC: 139 MMOL/L (ref 136–145)
SP GR UR STRIP: 1.02 (ref 1–1.03)
SQUAMOUS #/AREA URNS HPF: ABNORMAL /HPF
UROBILINOGEN UR QL STRIP: ABNORMAL
WBC NRBC COR # BLD: 10.37 10*3/MM3 (ref 3.4–10.8)
WBC UR QL AUTO: ABNORMAL /HPF

## 2019-07-27 PROCEDURE — 25010000002 CIPROFLOXACIN PER 200 MG: Performed by: PHYSICIAN ASSISTANT

## 2019-07-27 PROCEDURE — 74176 CT ABD & PELVIS W/O CONTRAST: CPT

## 2019-07-27 PROCEDURE — 96365 THER/PROPH/DIAG IV INF INIT: CPT

## 2019-07-27 PROCEDURE — 80053 COMPREHEN METABOLIC PANEL: CPT | Performed by: PHYSICIAN ASSISTANT

## 2019-07-27 PROCEDURE — 74176 CT ABD & PELVIS W/O CONTRAST: CPT | Performed by: RADIOLOGY

## 2019-07-27 PROCEDURE — 81001 URINALYSIS AUTO W/SCOPE: CPT | Performed by: PHYSICIAN ASSISTANT

## 2019-07-27 PROCEDURE — 87040 BLOOD CULTURE FOR BACTERIA: CPT | Performed by: PHYSICIAN ASSISTANT

## 2019-07-27 PROCEDURE — 86140 C-REACTIVE PROTEIN: CPT | Performed by: PHYSICIAN ASSISTANT

## 2019-07-27 PROCEDURE — 83605 ASSAY OF LACTIC ACID: CPT | Performed by: PHYSICIAN ASSISTANT

## 2019-07-27 PROCEDURE — 85025 COMPLETE CBC W/AUTO DIFF WBC: CPT | Performed by: PHYSICIAN ASSISTANT

## 2019-07-27 PROCEDURE — 99283 EMERGENCY DEPT VISIT LOW MDM: CPT

## 2019-07-27 RX ORDER — CIPROFLOXACIN 2 MG/ML
400 INJECTION, SOLUTION INTRAVENOUS ONCE
Status: COMPLETED | OUTPATIENT
Start: 2019-07-27 | End: 2019-07-27

## 2019-07-27 RX ORDER — CIPROFLOXACIN 500 MG/1
500 TABLET, FILM COATED ORAL 2 TIMES DAILY
Qty: 20 TABLET | Refills: 0 | Status: SHIPPED | OUTPATIENT
Start: 2019-07-27 | End: 2019-10-19

## 2019-07-27 RX ADMIN — CIPROFLOXACIN 400 MG: 2 INJECTION, SOLUTION INTRAVENOUS at 17:37

## 2019-08-01 LAB
BACTERIA SPEC AEROBE CULT: NORMAL
BACTERIA SPEC AEROBE CULT: NORMAL

## 2019-08-06 ENCOUNTER — APPOINTMENT (OUTPATIENT)
Dept: GENERAL RADIOLOGY | Facility: HOSPITAL | Age: 44
End: 2019-08-06

## 2019-08-06 ENCOUNTER — HOSPITAL ENCOUNTER (EMERGENCY)
Facility: HOSPITAL | Age: 44
Discharge: ANOTHER HOSPITAL | End: 2019-08-06
Attending: FAMILY MEDICINE | Admitting: FAMILY MEDICINE

## 2019-08-06 VITALS
OXYGEN SATURATION: 98 % | TEMPERATURE: 100 F | HEIGHT: 71 IN | RESPIRATION RATE: 20 BRPM | WEIGHT: 310 LBS | BODY MASS INDEX: 43.4 KG/M2 | SYSTOLIC BLOOD PRESSURE: 123 MMHG | DIASTOLIC BLOOD PRESSURE: 52 MMHG | HEART RATE: 91 BPM

## 2019-08-06 DIAGNOSIS — A41.9 SEPSIS, DUE TO UNSPECIFIED ORGANISM: Primary | ICD-10-CM

## 2019-08-06 DIAGNOSIS — L89.153 SACRAL DECUBITUS ULCER, STAGE III (HCC): ICD-10-CM

## 2019-08-06 LAB
A-A DO2: 35 MMHG (ref 0–300)
ALBUMIN SERPL-MCNC: 2.95 G/DL (ref 3.5–5.2)
ALBUMIN/GLOB SERPL: 0.6 G/DL
ALP SERPL-CCNC: 73 U/L (ref 39–117)
ALT SERPL W P-5'-P-CCNC: 22 U/L (ref 1–41)
ANION GAP SERPL CALCULATED.3IONS-SCNC: 14.6 MMOL/L (ref 5–15)
ANISOCYTOSIS BLD QL: NORMAL
ARTERIAL PATENCY WRIST A: POSITIVE
AST SERPL-CCNC: 20 U/L (ref 1–40)
ATMOSPHERIC PRESS: 726 MMHG
BASE EXCESS BLDA CALC-SCNC: -3.7 MMOL/L
BASOPHILS # BLD AUTO: 0.01 10*3/MM3 (ref 0–0.2)
BASOPHILS NFR BLD AUTO: 0.1 % (ref 0–1.5)
BDY SITE: ABNORMAL
BILIRUB SERPL-MCNC: 0.3 MG/DL (ref 0.2–1.2)
BODY TEMPERATURE: 98.6 C
BUN BLD-MCNC: 11 MG/DL (ref 6–20)
BUN/CREAT SERPL: 12.2 (ref 7–25)
CALCIUM SPEC-SCNC: 8.9 MG/DL (ref 8.6–10.5)
CHLORIDE SERPL-SCNC: 98 MMOL/L (ref 98–107)
CK SERPL-CCNC: 61 U/L (ref 20–200)
CO2 SERPL-SCNC: 21.4 MMOL/L (ref 22–29)
COHGB MFR BLD: 1.4 % (ref 0–5)
CREAT BLD-MCNC: 0.9 MG/DL (ref 0.76–1.27)
CRP SERPL-MCNC: 16.38 MG/DL (ref 0–0.5)
D-LACTATE SERPL-SCNC: 1.6 MMOL/L (ref 0.5–2)
D-LACTATE SERPL-SCNC: 3 MMOL/L (ref 0.5–2)
DEPRECATED RDW RBC AUTO: 46.5 FL (ref 37–54)
EOSINOPHIL # BLD AUTO: 0.12 10*3/MM3 (ref 0–0.4)
EOSINOPHIL NFR BLD AUTO: 0.9 % (ref 0.3–6.2)
ERYTHROCYTE [DISTWIDTH] IN BLOOD BY AUTOMATED COUNT: 16.2 % (ref 12.3–15.4)
GFR SERPL CREATININE-BSD FRML MDRD: 93 ML/MIN/1.73
GLOBULIN UR ELPH-MCNC: 5.2 GM/DL
GLUCOSE BLD-MCNC: 234 MG/DL (ref 65–99)
HCO3 BLDA-SCNC: 20 MMOL/L (ref 22–26)
HCT VFR BLD AUTO: 32.9 % (ref 37.5–51)
HCT VFR BLD CALC: 31 % (ref 42–52)
HGB BLD-MCNC: 9.5 G/DL (ref 13–17.7)
HGB BLDA-MCNC: 10.6 G/DL (ref 12–16)
HOLD SPECIMEN: NORMAL
HOROWITZ INDEX BLD+IHG-RTO: 21 %
HYPOCHROMIA BLD QL: NORMAL
IMM GRANULOCYTES # BLD AUTO: 0.05 10*3/MM3 (ref 0–0.05)
IMM GRANULOCYTES NFR BLD AUTO: 0.4 % (ref 0–0.5)
LYMPHOCYTES # BLD AUTO: 1.57 10*3/MM3 (ref 0.7–3.1)
LYMPHOCYTES NFR BLD AUTO: 11.6 % (ref 19.6–45.3)
MAGNESIUM SERPL-MCNC: 1.3 MG/DL (ref 1.6–2.6)
MCH RBC QN AUTO: 23.3 PG (ref 26.6–33)
MCHC RBC AUTO-ENTMCNC: 28.9 G/DL (ref 31.5–35.7)
MCV RBC AUTO: 80.6 FL (ref 79–97)
METHGB BLD QL: 0.3 % (ref 0–3)
MODALITY: ABNORMAL
MONOCYTES # BLD AUTO: 0.57 10*3/MM3 (ref 0.1–0.9)
MONOCYTES NFR BLD AUTO: 4.2 % (ref 5–12)
MYOGLOBIN SERPL-MCNC: 50.9 NG/ML (ref 28–72)
NEUTROPHILS # BLD AUTO: 11.22 10*3/MM3 (ref 1.7–7)
NEUTROPHILS NFR BLD AUTO: 82.8 % (ref 42.7–76)
NT-PROBNP SERPL-MCNC: 264 PG/ML (ref 5–450)
OXYHGB MFR BLDV: 92.1 % (ref 85–100)
PCO2 BLDA: 31.3 MM HG (ref 35–45)
PH BLDA: 7.42 PH UNITS (ref 7.35–7.45)
PLAT MORPH BLD: NORMAL
PLATELET # BLD AUTO: 494 10*3/MM3 (ref 140–450)
PMV BLD AUTO: 9.3 FL (ref 6–12)
PO2 BLDA: 70.1 MM HG (ref 80–100)
POTASSIUM BLD-SCNC: 3.7 MMOL/L (ref 3.5–5.2)
PROT SERPL-MCNC: 8.1 G/DL (ref 6–8.5)
RBC # BLD AUTO: 4.08 10*6/MM3 (ref 4.14–5.8)
SAO2 % BLDCOA: 93.7 % (ref 90–100)
SODIUM BLD-SCNC: 134 MMOL/L (ref 136–145)
TROPONIN T SERPL-MCNC: <0.01 NG/ML (ref 0–0.03)
TSH SERPL DL<=0.05 MIU/L-ACNC: 0.87 MIU/ML (ref 0.27–4.2)
WBC NRBC COR # BLD: 13.54 10*3/MM3 (ref 3.4–10.8)

## 2019-08-06 PROCEDURE — 80053 COMPREHEN METABOLIC PANEL: CPT | Performed by: FAMILY MEDICINE

## 2019-08-06 PROCEDURE — 82550 ASSAY OF CK (CPK): CPT | Performed by: FAMILY MEDICINE

## 2019-08-06 PROCEDURE — 83874 ASSAY OF MYOGLOBIN: CPT | Performed by: FAMILY MEDICINE

## 2019-08-06 PROCEDURE — 25010000002 LEVOFLOXACIN PER 250 MG: Performed by: FAMILY MEDICINE

## 2019-08-06 PROCEDURE — 86140 C-REACTIVE PROTEIN: CPT | Performed by: FAMILY MEDICINE

## 2019-08-06 PROCEDURE — 96367 TX/PROPH/DG ADDL SEQ IV INF: CPT

## 2019-08-06 PROCEDURE — 85007 BL SMEAR W/DIFF WBC COUNT: CPT | Performed by: FAMILY MEDICINE

## 2019-08-06 PROCEDURE — 36600 WITHDRAWAL OF ARTERIAL BLOOD: CPT | Performed by: FAMILY MEDICINE

## 2019-08-06 PROCEDURE — 84443 ASSAY THYROID STIM HORMONE: CPT | Performed by: FAMILY MEDICINE

## 2019-08-06 PROCEDURE — 82375 ASSAY CARBOXYHB QUANT: CPT | Performed by: FAMILY MEDICINE

## 2019-08-06 PROCEDURE — 96365 THER/PROPH/DIAG IV INF INIT: CPT

## 2019-08-06 PROCEDURE — 99284 EMERGENCY DEPT VISIT MOD MDM: CPT

## 2019-08-06 PROCEDURE — 93005 ELECTROCARDIOGRAM TRACING: CPT | Performed by: FAMILY MEDICINE

## 2019-08-06 PROCEDURE — 82805 BLOOD GASES W/O2 SATURATION: CPT | Performed by: FAMILY MEDICINE

## 2019-08-06 PROCEDURE — 36415 COLL VENOUS BLD VENIPUNCTURE: CPT

## 2019-08-06 PROCEDURE — 83735 ASSAY OF MAGNESIUM: CPT | Performed by: FAMILY MEDICINE

## 2019-08-06 PROCEDURE — 96361 HYDRATE IV INFUSION ADD-ON: CPT

## 2019-08-06 PROCEDURE — 83880 ASSAY OF NATRIURETIC PEPTIDE: CPT | Performed by: FAMILY MEDICINE

## 2019-08-06 PROCEDURE — 84484 ASSAY OF TROPONIN QUANT: CPT | Performed by: FAMILY MEDICINE

## 2019-08-06 PROCEDURE — 71045 X-RAY EXAM CHEST 1 VIEW: CPT | Performed by: RADIOLOGY

## 2019-08-06 PROCEDURE — 83050 HGB METHEMOGLOBIN QUAN: CPT | Performed by: FAMILY MEDICINE

## 2019-08-06 PROCEDURE — 83605 ASSAY OF LACTIC ACID: CPT | Performed by: FAMILY MEDICINE

## 2019-08-06 PROCEDURE — 87040 BLOOD CULTURE FOR BACTERIA: CPT | Performed by: FAMILY MEDICINE

## 2019-08-06 PROCEDURE — 71045 X-RAY EXAM CHEST 1 VIEW: CPT

## 2019-08-06 PROCEDURE — 85025 COMPLETE CBC W/AUTO DIFF WBC: CPT | Performed by: FAMILY MEDICINE

## 2019-08-06 RX ORDER — LEVOFLOXACIN 5 MG/ML
750 INJECTION, SOLUTION INTRAVENOUS ONCE
Status: COMPLETED | OUTPATIENT
Start: 2019-08-06 | End: 2019-08-06

## 2019-08-06 RX ORDER — SODIUM CHLORIDE 0.9 % (FLUSH) 0.9 %
10 SYRINGE (ML) INJECTION AS NEEDED
Status: DISCONTINUED | OUTPATIENT
Start: 2019-08-06 | End: 2019-08-06 | Stop reason: HOSPADM

## 2019-08-06 RX ORDER — SODIUM CHLORIDE 9 MG/ML
INJECTION, SOLUTION INTRAVENOUS
Status: DISCONTINUED
Start: 2019-08-06 | End: 2019-08-06 | Stop reason: HOSPADM

## 2019-08-06 RX ADMIN — LEVOFLOXACIN 750 MG: 5 INJECTION, SOLUTION INTRAVENOUS at 17:08

## 2019-08-06 RX ADMIN — AMPICILLIN AND SULBACTAM 1.5 G: 1; .5 INJECTION, POWDER, FOR SOLUTION INTRAMUSCULAR; INTRAVENOUS at 19:17

## 2019-08-06 RX ADMIN — SODIUM CHLORIDE 2259 ML: 9 INJECTION, SOLUTION INTRAVENOUS at 17:19

## 2019-08-06 NOTE — ED NOTES
Contacted pt's on call nurse practitioner to request results of wound culture performed last Monday.  On call provider reports culture grew Streptococcus pyogenes, group A.  She reports that there was no sensitivity report for this organism.  Second organism was group b strep agalactiae, which is susceptible to Levaquin, penicillin, and vancomycin.  Third organism was Acinetobacter beurmanni, sensitive to amp/sul.  Dr. Marroquin notified of information received.     Hortensia Paniagua RN  08/06/19 0860

## 2019-08-06 NOTE — ED NOTES
[facility] accepted. 30 min delay before they can send the truck up here.     Christian Díaz  08/06/19 1903
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
## 2019-08-06 NOTE — ED NOTES
CALLED [facility] FOR POSSIBLE PT TRANSFER TO [facility]      Bala Maldonado  08/06/19 2003

## 2019-08-11 LAB
BACTERIA SPEC AEROBE CULT: NORMAL
BACTERIA SPEC AEROBE CULT: NORMAL

## 2019-09-06 ENCOUNTER — APPOINTMENT (OUTPATIENT)
Dept: GENERAL RADIOLOGY | Facility: HOSPITAL | Age: 44
End: 2019-09-06

## 2019-09-06 ENCOUNTER — HOSPITAL ENCOUNTER (EMERGENCY)
Facility: HOSPITAL | Age: 44
Discharge: HOME | End: 2019-09-06
Attending: EMERGENCY MEDICINE | Admitting: EMERGENCY MEDICINE

## 2019-09-06 VITALS
HEIGHT: 71 IN | RESPIRATION RATE: 16 BRPM | DIASTOLIC BLOOD PRESSURE: 87 MMHG | SYSTOLIC BLOOD PRESSURE: 153 MMHG | BODY MASS INDEX: 39.9 KG/M2 | HEART RATE: 75 BPM | TEMPERATURE: 98 F | OXYGEN SATURATION: 98 % | WEIGHT: 285 LBS

## 2019-09-06 DIAGNOSIS — J40 BRONCHITIS: Primary | ICD-10-CM

## 2019-09-06 LAB
A-A DO2: 30.7 MMHG (ref 0–300)
ALBUMIN SERPL-MCNC: 3.76 G/DL (ref 3.5–5.2)
ALBUMIN/GLOB SERPL: 1 G/DL
ALP SERPL-CCNC: 66 U/L (ref 39–117)
ALT SERPL W P-5'-P-CCNC: 32 U/L (ref 1–41)
ANION GAP SERPL CALCULATED.3IONS-SCNC: 10.6 MMOL/L (ref 5–15)
ANISOCYTOSIS BLD QL: NORMAL
ARTERIAL PATENCY WRIST A: POSITIVE
AST SERPL-CCNC: 26 U/L (ref 1–40)
ATMOSPHERIC PRESS: 728 MMHG
BASE EXCESS BLDA CALC-SCNC: 1.8 MMOL/L
BASOPHILS # BLD AUTO: 0.02 10*3/MM3 (ref 0–0.2)
BASOPHILS NFR BLD AUTO: 0.2 % (ref 0–1.5)
BDY SITE: ABNORMAL
BILIRUB SERPL-MCNC: 0.3 MG/DL (ref 0.2–1.2)
BODY TEMPERATURE: 98.6 C
BUN BLD-MCNC: 15 MG/DL (ref 6–20)
BUN/CREAT SERPL: 19 (ref 7–25)
CALCIUM SPEC-SCNC: 9.2 MG/DL (ref 8.6–10.5)
CHLORIDE SERPL-SCNC: 106 MMOL/L (ref 98–107)
CO2 SERPL-SCNC: 26.4 MMOL/L (ref 22–29)
COHGB MFR BLD: 1.6 % (ref 0–5)
CREAT BLD-MCNC: 0.79 MG/DL (ref 0.76–1.27)
D DIMER PPP FEU-MCNC: 0.36 MCGFEU/ML (ref 0–0.5)
DEPRECATED RDW RBC AUTO: 51 FL (ref 37–54)
EOSINOPHIL # BLD AUTO: 0.29 10*3/MM3 (ref 0–0.4)
EOSINOPHIL NFR BLD AUTO: 3.4 % (ref 0.3–6.2)
ERYTHROCYTE [DISTWIDTH] IN BLOOD BY AUTOMATED COUNT: 16.9 % (ref 12.3–15.4)
GFR SERPL CREATININE-BSD FRML MDRD: 108 ML/MIN/1.73
GLOBULIN UR ELPH-MCNC: 3.9 GM/DL
GLUCOSE BLD-MCNC: 166 MG/DL (ref 65–99)
HCO3 BLDA-SCNC: 26.8 MMOL/L (ref 22–26)
HCT VFR BLD AUTO: 36.3 % (ref 37.5–51)
HCT VFR BLD CALC: 32 % (ref 42–52)
HGB BLD-MCNC: 10.3 G/DL (ref 13–17.7)
HGB BLDA-MCNC: 11 G/DL (ref 12–16)
HOROWITZ INDEX BLD+IHG-RTO: 21 %
HYPOCHROMIA BLD QL: NORMAL
IMM GRANULOCYTES # BLD AUTO: 0.01 10*3/MM3 (ref 0–0.05)
IMM GRANULOCYTES NFR BLD AUTO: 0.1 % (ref 0–0.5)
LYMPHOCYTES # BLD AUTO: 1.58 10*3/MM3 (ref 0.7–3.1)
LYMPHOCYTES NFR BLD AUTO: 18.6 % (ref 19.6–45.3)
MCH RBC QN AUTO: 23.6 PG (ref 26.6–33)
MCHC RBC AUTO-ENTMCNC: 28.4 G/DL (ref 31.5–35.7)
MCV RBC AUTO: 83.3 FL (ref 79–97)
METHGB BLD QL: 0.2 % (ref 0–3)
MODALITY: ABNORMAL
MONOCYTES # BLD AUTO: 0.31 10*3/MM3 (ref 0.1–0.9)
MONOCYTES NFR BLD AUTO: 3.6 % (ref 5–12)
NEUTROPHILS # BLD AUTO: 6.3 10*3/MM3 (ref 1.7–7)
NEUTROPHILS NFR BLD AUTO: 74.1 % (ref 42.7–76)
NT-PROBNP SERPL-MCNC: 558.5 PG/ML (ref 5–450)
OXYHGB MFR BLDV: 88.2 % (ref 85–100)
PCO2 BLDA: 43.6 MM HG (ref 35–45)
PH BLDA: 7.41 PH UNITS (ref 7.35–7.45)
PLAT MORPH BLD: NORMAL
PLATELET # BLD AUTO: 350 10*3/MM3 (ref 140–450)
PMV BLD AUTO: 10.2 FL (ref 6–12)
PO2 BLDA: 60.1 MM HG (ref 80–100)
POTASSIUM BLD-SCNC: 3.8 MMOL/L (ref 3.5–5.2)
PROT SERPL-MCNC: 7.7 G/DL (ref 6–8.5)
RBC # BLD AUTO: 4.36 10*6/MM3 (ref 4.14–5.8)
SAO2 % BLDCOA: 89.8 % (ref 90–100)
SODIUM BLD-SCNC: 143 MMOL/L (ref 136–145)
WBC NRBC COR # BLD: 8.51 10*3/MM3 (ref 3.4–10.8)

## 2019-09-06 PROCEDURE — 36600 WITHDRAWAL OF ARTERIAL BLOOD: CPT | Performed by: NURSE PRACTITIONER

## 2019-09-06 PROCEDURE — 80053 COMPREHEN METABOLIC PANEL: CPT | Performed by: NURSE PRACTITIONER

## 2019-09-06 PROCEDURE — 82805 BLOOD GASES W/O2 SATURATION: CPT | Performed by: NURSE PRACTITIONER

## 2019-09-06 PROCEDURE — 85025 COMPLETE CBC W/AUTO DIFF WBC: CPT | Performed by: NURSE PRACTITIONER

## 2019-09-06 PROCEDURE — 85007 BL SMEAR W/DIFF WBC COUNT: CPT | Performed by: NURSE PRACTITIONER

## 2019-09-06 PROCEDURE — 71045 X-RAY EXAM CHEST 1 VIEW: CPT

## 2019-09-06 PROCEDURE — 87040 BLOOD CULTURE FOR BACTERIA: CPT | Performed by: NURSE PRACTITIONER

## 2019-09-06 PROCEDURE — 93005 ELECTROCARDIOGRAM TRACING: CPT | Performed by: EMERGENCY MEDICINE

## 2019-09-06 PROCEDURE — 83880 ASSAY OF NATRIURETIC PEPTIDE: CPT | Performed by: NURSE PRACTITIONER

## 2019-09-06 PROCEDURE — 82375 ASSAY CARBOXYHB QUANT: CPT | Performed by: NURSE PRACTITIONER

## 2019-09-06 PROCEDURE — 93010 ELECTROCARDIOGRAM REPORT: CPT | Performed by: INTERNAL MEDICINE

## 2019-09-06 PROCEDURE — 71045 X-RAY EXAM CHEST 1 VIEW: CPT | Performed by: RADIOLOGY

## 2019-09-06 PROCEDURE — 85379 FIBRIN DEGRADATION QUANT: CPT | Performed by: NURSE PRACTITIONER

## 2019-09-06 PROCEDURE — 99284 EMERGENCY DEPT VISIT MOD MDM: CPT

## 2019-09-06 PROCEDURE — 83050 HGB METHEMOGLOBIN QUAN: CPT | Performed by: NURSE PRACTITIONER

## 2019-09-06 RX ORDER — DOXYCYCLINE 100 MG/1
100 CAPSULE ORAL 2 TIMES DAILY
Qty: 20 CAPSULE | Refills: 0 | Status: SHIPPED | OUTPATIENT
Start: 2019-09-06 | End: 2019-10-19

## 2019-09-06 RX ORDER — SODIUM CHLORIDE 0.9 % (FLUSH) 0.9 %
10 SYRINGE (ML) INJECTION AS NEEDED
Status: DISCONTINUED | OUTPATIENT
Start: 2019-09-06 | End: 2019-09-06 | Stop reason: HOSPADM
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
## 2019-09-06 NOTE — DISCHARGE INSTRUCTIONS
Call one of the offices below to establish a primary care provider.  If you are unable to get an appointment and feel it is an emergency and need to be seen immediately please return to the Emergency Department.    Call one of the office below to set up a primary care provider.    Dr. Ministerio Giron                                                                                                       [address] [facility] [address]  [phone]    Dr. Flores, Dr. ZEYAD Chiu, Dr. JUAN Chiu ([facility])  [address] [facility] [address]  [phone]    Dr. Vick, Dr. Quiles, Dr. Hart ([facility])  [address] [facility] [address]  [phone]    Dr. Strickland  [address] [facility] [address]  [phone]    Dr. Queen, Dr. Frazier, Dr. Adhikari, Dr. Harris ([facility])  [address] [place] [address] [address]  [facility] [address]  [phone]    Dr. Darlene Beltrán  [address] [facility] [place] [address]  [phone]    Dr. Mirela Villanueva  [address]   [address]  [facility] [place] [address]  [phone]    Dr. Giron  [address] [facility] [address]  [phone]    Dr. Freedman, Dr. Chris  [address] [facility] [place] [address]  [phone]    Dr. Turner  [address] [address]                                                              [address]  [facility] [place] [address]  [phone]    Dr. Humphrey  [address] [facility] [id number] [phone]    Dr. Gabi Cuellar  [address]  [address]  [facility] [address]  [phone]

## 2019-09-11 LAB
BACTERIA SPEC AEROBE CULT: NORMAL
BACTERIA SPEC AEROBE CULT: NORMAL

## 2019-10-19 ENCOUNTER — HOSPITAL ENCOUNTER (INPATIENT)
Facility: HOSPITAL | Age: 44
LOS: 8 days | Discharge: HOME | End: 2019-10-27
Attending: EMERGENCY MEDICINE | Admitting: INTERNAL MEDICINE

## 2019-10-19 ENCOUNTER — APPOINTMENT (OUTPATIENT)
Dept: CT IMAGING | Facility: HOSPITAL | Age: 44
End: 2019-10-19

## 2019-10-19 DIAGNOSIS — L89.002 PRESSURE INJURY OF ELBOW, STAGE 2, UNSPECIFIED LATERALITY (HCC): ICD-10-CM

## 2019-10-19 DIAGNOSIS — L89.94 PRESSURE INJURY, STAGE 4, WITH INFECTION (HCC): Primary | ICD-10-CM

## 2019-10-19 DIAGNOSIS — M86.9 OSTEOMYELITIS, UNSPECIFIED SITE, UNSPECIFIED TYPE (HCC): ICD-10-CM

## 2019-10-19 DIAGNOSIS — L08.9 PRESSURE INJURY, STAGE 4, WITH INFECTION (HCC): Primary | ICD-10-CM

## 2019-10-19 LAB
ALBUMIN SERPL-MCNC: 3.52 G/DL (ref 3.5–5.2)
ALBUMIN/GLOB SERPL: 0.8 G/DL
ALP SERPL-CCNC: 81 U/L (ref 39–117)
ALT SERPL W P-5'-P-CCNC: 55 U/L (ref 1–41)
ANION GAP SERPL CALCULATED.3IONS-SCNC: 13.7 MMOL/L (ref 5–15)
AST SERPL-CCNC: 62 U/L (ref 1–40)
BASOPHILS # BLD AUTO: 0.02 10*3/MM3 (ref 0–0.2)
BASOPHILS NFR BLD AUTO: 0.2 % (ref 0–1.5)
BILIRUB SERPL-MCNC: <0.2 MG/DL (ref 0.2–1.2)
BUN BLD-MCNC: 17 MG/DL (ref 6–20)
BUN/CREAT SERPL: 21 (ref 7–25)
CALCIUM SPEC-SCNC: 9.2 MG/DL (ref 8.6–10.5)
CHLORIDE SERPL-SCNC: 99 MMOL/L (ref 98–107)
CHOLEST SERPL-MCNC: 140 MG/DL (ref 0–200)
CO2 SERPL-SCNC: 22.3 MMOL/L (ref 22–29)
CREAT BLD-MCNC: 0.81 MG/DL (ref 0.76–1.27)
CRP SERPL-MCNC: 3.3 MG/DL (ref 0–0.5)
D-LACTATE SERPL-SCNC: 1.9 MMOL/L (ref 0.5–2)
D-LACTATE SERPL-SCNC: 2.8 MMOL/L (ref 0.5–2)
DEPRECATED RDW RBC AUTO: 50.2 FL (ref 37–54)
EOSINOPHIL # BLD AUTO: 0.24 10*3/MM3 (ref 0–0.4)
EOSINOPHIL NFR BLD AUTO: 2.6 % (ref 0.3–6.2)
ERYTHROCYTE [DISTWIDTH] IN BLOOD BY AUTOMATED COUNT: 16.6 % (ref 12.3–15.4)
GFR SERPL CREATININE-BSD FRML MDRD: 105 ML/MIN/1.73
GLOBULIN UR ELPH-MCNC: 4.4 GM/DL
GLUCOSE BLD-MCNC: 465 MG/DL (ref 65–99)
GLUCOSE BLDC GLUCOMTR-MCNC: 318 MG/DL (ref 70–130)
HBA1C MFR BLD: 8.9 % (ref 4.8–5.6)
HCT VFR BLD AUTO: 37.8 % (ref 37.5–51)
HDLC SERPL-MCNC: 33 MG/DL (ref 40–60)
HGB BLD-MCNC: 11.1 G/DL (ref 13–17.7)
HOLD SPECIMEN: NORMAL
IMM GRANULOCYTES # BLD AUTO: 0.03 10*3/MM3 (ref 0–0.05)
IMM GRANULOCYTES NFR BLD AUTO: 0.3 % (ref 0–0.5)
LDLC SERPL CALC-MCNC: 75 MG/DL (ref 0–100)
LDLC/HDLC SERPL: 2.27 {RATIO}
LYMPHOCYTES # BLD AUTO: 1.61 10*3/MM3 (ref 0.7–3.1)
LYMPHOCYTES NFR BLD AUTO: 17.6 % (ref 19.6–45.3)
MCH RBC QN AUTO: 24.2 PG (ref 26.6–33)
MCHC RBC AUTO-ENTMCNC: 29.4 G/DL (ref 31.5–35.7)
MCV RBC AUTO: 82.4 FL (ref 79–97)
MONOCYTES # BLD AUTO: 0.3 10*3/MM3 (ref 0.1–0.9)
MONOCYTES NFR BLD AUTO: 3.3 % (ref 5–12)
NEUTROPHILS # BLD AUTO: 6.93 10*3/MM3 (ref 1.7–7)
NEUTROPHILS NFR BLD AUTO: 76 % (ref 42.7–76)
PLATELET # BLD AUTO: 377 10*3/MM3 (ref 140–450)
PMV BLD AUTO: 10.5 FL (ref 6–12)
POTASSIUM BLD-SCNC: 3.8 MMOL/L (ref 3.5–5.2)
PROT SERPL-MCNC: 7.9 G/DL (ref 6–8.5)
RBC # BLD AUTO: 4.59 10*6/MM3 (ref 4.14–5.8)
SODIUM BLD-SCNC: 135 MMOL/L (ref 136–145)
TRIGL SERPL-MCNC: 160 MG/DL (ref 0–150)
VLDLC SERPL-MCNC: 32 MG/DL
WBC NRBC COR # BLD: 9.13 10*3/MM3 (ref 3.4–10.8)

## 2019-10-19 PROCEDURE — 99284 EMERGENCY DEPT VISIT MOD MDM: CPT

## 2019-10-19 PROCEDURE — 82962 GLUCOSE BLOOD TEST: CPT

## 2019-10-19 PROCEDURE — 83605 ASSAY OF LACTIC ACID: CPT | Performed by: EMERGENCY MEDICINE

## 2019-10-19 PROCEDURE — 0 IOVERSOL 68 % SOLUTION: Performed by: EMERGENCY MEDICINE

## 2019-10-19 PROCEDURE — 83540 ASSAY OF IRON: CPT | Performed by: HOSPITALIST

## 2019-10-19 PROCEDURE — 80061 LIPID PANEL: CPT | Performed by: NURSE PRACTITIONER

## 2019-10-19 PROCEDURE — 74177 CT ABD & PELVIS W/CONTRAST: CPT

## 2019-10-19 PROCEDURE — 87205 SMEAR GRAM STAIN: CPT | Performed by: EMERGENCY MEDICINE

## 2019-10-19 PROCEDURE — 25010000002 LEVOFLOXACIN PER 250 MG: Performed by: EMERGENCY MEDICINE

## 2019-10-19 PROCEDURE — 36415 COLL VENOUS BLD VENIPUNCTURE: CPT

## 2019-10-19 PROCEDURE — 80053 COMPREHEN METABOLIC PANEL: CPT | Performed by: EMERGENCY MEDICINE

## 2019-10-19 PROCEDURE — 87040 BLOOD CULTURE FOR BACTERIA: CPT | Performed by: EMERGENCY MEDICINE

## 2019-10-19 PROCEDURE — 99223 1ST HOSP IP/OBS HIGH 75: CPT | Performed by: HOSPITALIST

## 2019-10-19 PROCEDURE — 85025 COMPLETE CBC W/AUTO DIFF WBC: CPT | Performed by: EMERGENCY MEDICINE

## 2019-10-19 PROCEDURE — 84466 ASSAY OF TRANSFERRIN: CPT | Performed by: HOSPITALIST

## 2019-10-19 PROCEDURE — 87070 CULTURE OTHR SPECIMN AEROBIC: CPT | Performed by: EMERGENCY MEDICINE

## 2019-10-19 PROCEDURE — 83036 HEMOGLOBIN GLYCOSYLATED A1C: CPT | Performed by: HOSPITALIST

## 2019-10-19 PROCEDURE — 86140 C-REACTIVE PROTEIN: CPT | Performed by: EMERGENCY MEDICINE

## 2019-10-19 RX ORDER — NICOTINE 14MG/24HR
1 PATCH, TRANSDERMAL 24 HOURS TRANSDERMAL DAILY
COMMUNITY
End: 2021-02-07

## 2019-10-19 RX ORDER — SODIUM CHLORIDE 0.9 % (FLUSH) 0.9 %
10 SYRINGE (ML) INJECTION AS NEEDED
Status: DISCONTINUED | OUTPATIENT
Start: 2019-10-19 | End: 2019-10-27 | Stop reason: HOSPADM

## 2019-10-19 RX ORDER — DEXTROSE MONOHYDRATE 25 G/50ML
25 INJECTION, SOLUTION INTRAVENOUS
Status: DISCONTINUED | OUTPATIENT
Start: 2019-10-19 | End: 2019-10-27 | Stop reason: HOSPADM

## 2019-10-19 RX ORDER — NICOTINE POLACRILEX 4 MG
15 LOZENGE BUCCAL
Status: DISCONTINUED | OUTPATIENT
Start: 2019-10-19 | End: 2019-10-27 | Stop reason: HOSPADM

## 2019-10-19 RX ORDER — METFORMIN HYDROCHLORIDE 500 MG/1
500 TABLET, EXTENDED RELEASE ORAL
COMMUNITY
End: 2021-02-07

## 2019-10-19 RX ORDER — SODIUM CHLORIDE 0.9 % (FLUSH) 0.9 %
10 SYRINGE (ML) INJECTION EVERY 12 HOURS SCHEDULED
Status: DISCONTINUED | OUTPATIENT
Start: 2019-10-20 | End: 2019-10-27 | Stop reason: HOSPADM

## 2019-10-19 RX ORDER — ONDANSETRON HYDROCHLORIDE 8 MG/1
8 TABLET, FILM COATED ORAL EVERY 8 HOURS PRN
COMMUNITY
End: 2021-02-07

## 2019-10-19 RX ORDER — LEVOFLOXACIN 5 MG/ML
750 INJECTION, SOLUTION INTRAVENOUS ONCE
Status: COMPLETED | OUTPATIENT
Start: 2019-10-19 | End: 2019-10-19

## 2019-10-19 RX ORDER — DULOXETIN HYDROCHLORIDE 60 MG/1
60 CAPSULE, DELAYED RELEASE ORAL 2 TIMES DAILY
COMMUNITY

## 2019-10-19 RX ADMIN — LEVOFLOXACIN 750 MG: 750 INJECTION, SOLUTION INTRAVENOUS at 19:16

## 2019-10-19 RX ADMIN — SODIUM CHLORIDE 1000 ML: 9 INJECTION, SOLUTION INTRAVENOUS at 17:45

## 2019-10-19 RX ADMIN — IOVERSOL 90 ML: 678 INJECTION INTRA-ARTERIAL; INTRAVENOUS at 20:20

## 2019-10-19 RX ADMIN — AMPICILLIN SODIUM AND SULBACTAM SODIUM 3 G: 2; 1 INJECTION, POWDER, FOR SOLUTION INTRAMUSCULAR; INTRAVENOUS at 19:26

## 2019-10-19 RX ADMIN — SODIUM CHLORIDE 1000 ML: 9 INJECTION, SOLUTION INTRAVENOUS at 20:54

## 2019-10-20 LAB
ALBUMIN SERPL-MCNC: 3.4 G/DL (ref 3.5–5.2)
ALBUMIN/GLOB SERPL: 0.8 G/DL
ALP SERPL-CCNC: 75 U/L (ref 39–117)
ALT SERPL W P-5'-P-CCNC: 47 U/L (ref 1–41)
ANION GAP SERPL CALCULATED.3IONS-SCNC: 10.9 MMOL/L (ref 5–15)
AST SERPL-CCNC: 42 U/L (ref 1–40)
BASOPHILS # BLD AUTO: 0.02 10*3/MM3 (ref 0–0.2)
BASOPHILS NFR BLD AUTO: 0.2 % (ref 0–1.5)
BILIRUB SERPL-MCNC: <0.2 MG/DL (ref 0.2–1.2)
BUN BLD-MCNC: 13 MG/DL (ref 6–20)
BUN/CREAT SERPL: 16.7 (ref 7–25)
CALCIUM SPEC-SCNC: 8.8 MG/DL (ref 8.6–10.5)
CHLORIDE SERPL-SCNC: 102 MMOL/L (ref 98–107)
CO2 SERPL-SCNC: 25.1 MMOL/L (ref 22–29)
CREAT BLD-MCNC: 0.78 MG/DL (ref 0.76–1.27)
CRP SERPL-MCNC: 3.15 MG/DL (ref 0–0.5)
DEPRECATED RDW RBC AUTO: 49.5 FL (ref 37–54)
EOSINOPHIL # BLD AUTO: 0.3 10*3/MM3 (ref 0–0.4)
EOSINOPHIL NFR BLD AUTO: 2.8 % (ref 0.3–6.2)
ERYTHROCYTE [DISTWIDTH] IN BLOOD BY AUTOMATED COUNT: 16.5 % (ref 12.3–15.4)
FOLATE SERPL-MCNC: 19.5 NG/ML (ref 4.78–24.2)
GFR SERPL CREATININE-BSD FRML MDRD: 109 ML/MIN/1.73
GLOBULIN UR ELPH-MCNC: 4.3 GM/DL
GLUCOSE BLD-MCNC: 226 MG/DL (ref 65–99)
GLUCOSE BLDC GLUCOMTR-MCNC: 186 MG/DL (ref 70–130)
GLUCOSE BLDC GLUCOMTR-MCNC: 228 MG/DL (ref 70–130)
GLUCOSE BLDC GLUCOMTR-MCNC: 237 MG/DL (ref 70–130)
GLUCOSE BLDC GLUCOMTR-MCNC: 245 MG/DL (ref 70–130)
GLUCOSE BLDC GLUCOMTR-MCNC: 262 MG/DL (ref 70–130)
GLUCOSE BLDC GLUCOMTR-MCNC: 262 MG/DL (ref 70–130)
HAV IGM SERPL QL IA: NORMAL
HBV CORE IGM SERPL QL IA: NORMAL
HBV SURFACE AG SERPL QL IA: NORMAL
HCT VFR BLD AUTO: 35.7 % (ref 37.5–51)
HCV AB SER DONR QL: NORMAL
HGB BLD-MCNC: 10.7 G/DL (ref 13–17.7)
IMM GRANULOCYTES # BLD AUTO: 0.03 10*3/MM3 (ref 0–0.05)
IMM GRANULOCYTES NFR BLD AUTO: 0.3 % (ref 0–0.5)
IRON 24H UR-MRATE: 24 MCG/DL (ref 59–158)
IRON SATN MFR SERPL: 5 % (ref 20–50)
LYMPHOCYTES # BLD AUTO: 2.2 10*3/MM3 (ref 0.7–3.1)
LYMPHOCYTES NFR BLD AUTO: 20.4 % (ref 19.6–45.3)
MCH RBC QN AUTO: 24.5 PG (ref 26.6–33)
MCHC RBC AUTO-ENTMCNC: 30 G/DL (ref 31.5–35.7)
MCV RBC AUTO: 81.7 FL (ref 79–97)
MONOCYTES # BLD AUTO: 0.46 10*3/MM3 (ref 0.1–0.9)
MONOCYTES NFR BLD AUTO: 4.3 % (ref 5–12)
NEUTROPHILS # BLD AUTO: 7.75 10*3/MM3 (ref 1.7–7)
NEUTROPHILS NFR BLD AUTO: 72 % (ref 42.7–76)
NRBC BLD AUTO-RTO: 0 /100 WBC (ref 0–0.2)
PLATELET # BLD AUTO: 401 10*3/MM3 (ref 140–450)
PMV BLD AUTO: 10.2 FL (ref 6–12)
POTASSIUM BLD-SCNC: 3.7 MMOL/L (ref 3.5–5.2)
PROT SERPL-MCNC: 7.7 G/DL (ref 6–8.5)
RBC # BLD AUTO: 4.37 10*6/MM3 (ref 4.14–5.8)
SODIUM BLD-SCNC: 138 MMOL/L (ref 136–145)
TIBC SERPL-MCNC: 469 MCG/DL (ref 298–536)
TRANSFERRIN SERPL-MCNC: 315 MG/DL (ref 200–360)
VIT B12 BLD-MCNC: 606 PG/ML (ref 211–946)
WBC NRBC COR # BLD: 10.76 10*3/MM3 (ref 3.4–10.8)

## 2019-10-20 PROCEDURE — 63710000001 INSULIN ASPART PER 5 UNITS: Performed by: HOSPITALIST

## 2019-10-20 PROCEDURE — 93010 ELECTROCARDIOGRAM REPORT: CPT | Performed by: INTERNAL MEDICINE

## 2019-10-20 PROCEDURE — 80074 ACUTE HEPATITIS PANEL: CPT | Performed by: HOSPITALIST

## 2019-10-20 PROCEDURE — 82607 VITAMIN B-12: CPT | Performed by: HOSPITALIST

## 2019-10-20 PROCEDURE — 94660 CPAP INITIATION&MGMT: CPT

## 2019-10-20 PROCEDURE — 25010000002 VANCOMYCIN 5 G RECONSTITUTED SOLUTION 5,000 MG VIAL: Performed by: NURSE PRACTITIONER

## 2019-10-20 PROCEDURE — 25010000002 CEFEPIME PER 500 MG: Performed by: NURSE PRACTITIONER

## 2019-10-20 PROCEDURE — 82962 GLUCOSE BLOOD TEST: CPT

## 2019-10-20 PROCEDURE — 86140 C-REACTIVE PROTEIN: CPT | Performed by: HOSPITALIST

## 2019-10-20 PROCEDURE — 82746 ASSAY OF FOLIC ACID SERUM: CPT | Performed by: HOSPITALIST

## 2019-10-20 PROCEDURE — 94799 UNLISTED PULMONARY SVC/PX: CPT

## 2019-10-20 PROCEDURE — 80053 COMPREHEN METABOLIC PANEL: CPT | Performed by: HOSPITALIST

## 2019-10-20 PROCEDURE — 93005 ELECTROCARDIOGRAM TRACING: CPT | Performed by: HOSPITALIST

## 2019-10-20 PROCEDURE — 99232 SBSQ HOSP IP/OBS MODERATE 35: CPT | Performed by: INTERNAL MEDICINE

## 2019-10-20 PROCEDURE — 99221 1ST HOSP IP/OBS SF/LOW 40: CPT | Performed by: SURGERY

## 2019-10-20 PROCEDURE — 63710000001 INSULIN DETEMIR PER 5 UNITS: Performed by: HOSPITALIST

## 2019-10-20 PROCEDURE — 25010000002 ERTAPENEM 1 GM/100ML SOLUTION: Performed by: HOSPITALIST

## 2019-10-20 PROCEDURE — 85025 COMPLETE CBC W/AUTO DIFF WBC: CPT | Performed by: HOSPITALIST

## 2019-10-20 RX ORDER — CASTOR OIL AND BALSAM, PERU 788; 87 MG/G; MG/G
OINTMENT TOPICAL EVERY 12 HOURS SCHEDULED
Status: DISCONTINUED | OUTPATIENT
Start: 2019-10-20 | End: 2019-10-27 | Stop reason: HOSPADM

## 2019-10-20 RX ORDER — ATORVASTATIN CALCIUM 10 MG/1
10 TABLET, FILM COATED ORAL NIGHTLY
Status: DISCONTINUED | OUTPATIENT
Start: 2019-10-20 | End: 2019-10-27 | Stop reason: HOSPADM

## 2019-10-20 RX ORDER — METHENAMINE HIPPURATE 1000 MG/1
1 TABLET ORAL 2 TIMES DAILY
Status: CANCELLED | OUTPATIENT
Start: 2019-10-20

## 2019-10-20 RX ORDER — ONDANSETRON 4 MG/1
8 TABLET, FILM COATED ORAL EVERY 8 HOURS PRN
Status: CANCELLED | OUTPATIENT
Start: 2019-10-20

## 2019-10-20 RX ORDER — SUCRALFATE 1 G/1
1 TABLET ORAL
Status: DISCONTINUED | OUTPATIENT
Start: 2019-10-20 | End: 2019-10-27 | Stop reason: HOSPADM

## 2019-10-20 RX ORDER — SODIUM HYPOCHLORITE 1.25 MG/ML
SOLUTION TOPICAL EVERY 12 HOURS
Status: DISCONTINUED | OUTPATIENT
Start: 2019-10-20 | End: 2019-10-27 | Stop reason: HOSPADM

## 2019-10-20 RX ORDER — PANTOPRAZOLE SODIUM 40 MG/10ML
40 INJECTION, POWDER, LYOPHILIZED, FOR SOLUTION INTRAVENOUS ONCE
Status: COMPLETED | OUTPATIENT
Start: 2019-10-20 | End: 2019-10-20

## 2019-10-20 RX ORDER — DULOXETIN HYDROCHLORIDE 60 MG/1
60 CAPSULE, DELAYED RELEASE ORAL 2 TIMES DAILY
Status: DISCONTINUED | OUTPATIENT
Start: 2019-10-20 | End: 2019-10-27 | Stop reason: HOSPADM

## 2019-10-20 RX ORDER — FAMOTIDINE 20 MG/1
20 TABLET, FILM COATED ORAL
Status: DISCONTINUED | OUTPATIENT
Start: 2019-10-20 | End: 2019-10-27 | Stop reason: HOSPADM

## 2019-10-20 RX ORDER — ARIPIPRAZOLE 10 MG/1
10 TABLET ORAL NIGHTLY
Status: DISCONTINUED | OUTPATIENT
Start: 2019-10-20 | End: 2019-10-27 | Stop reason: HOSPADM

## 2019-10-20 RX ORDER — MODAFINIL 100 MG/1
200 TABLET ORAL DAILY
Status: DISCONTINUED | OUTPATIENT
Start: 2019-10-20 | End: 2019-10-27 | Stop reason: HOSPADM

## 2019-10-20 RX ORDER — BACLOFEN 10 MG/1
30 TABLET ORAL
Status: DISCONTINUED | OUTPATIENT
Start: 2019-10-20 | End: 2019-10-27 | Stop reason: HOSPADM

## 2019-10-20 RX ORDER — INSULIN GLARGINE 100 [IU]/ML
20 INJECTION, SOLUTION SUBCUTANEOUS DAILY
COMMUNITY
End: 2021-02-08

## 2019-10-20 RX ORDER — NICOTINE 14MG/24HR
1 PATCH, TRANSDERMAL 24 HOURS TRANSDERMAL DAILY
Status: CANCELLED | OUTPATIENT
Start: 2019-10-20

## 2019-10-20 RX ORDER — GABAPENTIN 100 MG/1
100 CAPSULE ORAL EVERY 8 HOURS SCHEDULED
Status: CANCELLED | OUTPATIENT
Start: 2019-10-20

## 2019-10-20 RX ORDER — ALBUTEROL SULFATE 2.5 MG/3ML
2.5 SOLUTION RESPIRATORY (INHALATION) EVERY 4 HOURS PRN
Status: CANCELLED | OUTPATIENT
Start: 2019-10-20

## 2019-10-20 RX ORDER — GABAPENTIN 400 MG/1
800 CAPSULE ORAL EVERY 8 HOURS SCHEDULED
Status: DISCONTINUED | OUTPATIENT
Start: 2019-10-20 | End: 2019-10-27 | Stop reason: HOSPADM

## 2019-10-20 RX ORDER — L.ACID,PARA/B.BIFIDUM/S.THERM 8B CELL
1 CAPSULE ORAL DAILY
Status: DISCONTINUED | OUTPATIENT
Start: 2019-10-20 | End: 2019-10-27 | Stop reason: HOSPADM

## 2019-10-20 RX ADMIN — SUCRALFATE 1 G: 1 TABLET ORAL at 16:37

## 2019-10-20 RX ADMIN — CEFEPIME: 1 INJECTION, POWDER, FOR SOLUTION INTRAVENOUS at 21:37

## 2019-10-20 RX ADMIN — CASTOR OIL AND BALSAM, PERU: 788; 87 OINTMENT TOPICAL at 21:44

## 2019-10-20 RX ADMIN — DULOXETINE HYDROCHLORIDE 60 MG: 60 CAPSULE, DELAYED RELEASE ORAL at 21:43

## 2019-10-20 RX ADMIN — INSULIN ASPART 5 UNITS: 100 INJECTION, SOLUTION INTRAVENOUS; SUBCUTANEOUS at 21:43

## 2019-10-20 RX ADMIN — SUCRALFATE 1 G: 1 TABLET ORAL at 21:43

## 2019-10-20 RX ADMIN — AMPICILLIN SODIUM AND SULBACTAM SODIUM 3 G: 2; 1 INJECTION, POWDER, FOR SOLUTION INTRAMUSCULAR; INTRAVENOUS at 08:53

## 2019-10-20 RX ADMIN — BACLOFEN 30 MG: 10 TABLET ORAL at 16:36

## 2019-10-20 RX ADMIN — APIXABAN 5 MG: 5 TABLET, FILM COATED ORAL at 21:43

## 2019-10-20 RX ADMIN — ARIPIPRAZOLE 10 MG: 10 TABLET ORAL at 21:43

## 2019-10-20 RX ADMIN — CEFEPIME HYDROCHLORIDE: 2 INJECTION, POWDER, FOR SOLUTION INTRAVENOUS at 23:42

## 2019-10-20 RX ADMIN — INSULIN ASPART 5 UNITS: 100 INJECTION, SOLUTION INTRAVENOUS; SUBCUTANEOUS at 17:12

## 2019-10-20 RX ADMIN — INSULIN DETEMIR 20 UNITS: 100 INJECTION, SOLUTION SUBCUTANEOUS at 21:44

## 2019-10-20 RX ADMIN — SODIUM HYPOCHLORITE: 1.25 SOLUTION TOPICAL at 16:39

## 2019-10-20 RX ADMIN — CASTOR OIL AND BALSAM, PERU: 788; 87 OINTMENT TOPICAL at 14:42

## 2019-10-20 RX ADMIN — ERTAPENEM SODIUM 1 G: 1 INJECTION, POWDER, LYOPHILIZED, FOR SOLUTION INTRAMUSCULAR; INTRAVENOUS at 01:34

## 2019-10-20 RX ADMIN — SODIUM CHLORIDE, PRESERVATIVE FREE 10 ML: 5 INJECTION INTRAVENOUS at 08:55

## 2019-10-20 RX ADMIN — AMPICILLIN SODIUM AND SULBACTAM SODIUM 3 G: 2; 1 INJECTION, POWDER, FOR SOLUTION INTRAMUSCULAR; INTRAVENOUS at 14:42

## 2019-10-20 RX ADMIN — AMPICILLIN SODIUM AND SULBACTAM SODIUM 3 G: 2; 1 INJECTION, POWDER, FOR SOLUTION INTRAMUSCULAR; INTRAVENOUS at 02:42

## 2019-10-20 RX ADMIN — INSULIN ASPART 8 UNITS: 100 INJECTION, SOLUTION INTRAVENOUS; SUBCUTANEOUS at 01:23

## 2019-10-20 RX ADMIN — CEFEPIME HYDROCHLORIDE: 2 INJECTION, POWDER, FOR SOLUTION INTRAVENOUS at 22:44

## 2019-10-20 RX ADMIN — VANCOMYCIN HYDROCHLORIDE 2000 MG: 5 INJECTION, POWDER, LYOPHILIZED, FOR SOLUTION INTRAVENOUS at 17:13

## 2019-10-20 RX ADMIN — GABAPENTIN 800 MG: 400 CAPSULE ORAL at 21:43

## 2019-10-20 RX ADMIN — INSULIN DETEMIR 10 UNITS: 100 INJECTION, SOLUTION SUBCUTANEOUS at 01:23

## 2019-10-20 RX ADMIN — ATORVASTATIN CALCIUM 10 MG: 10 TABLET, FILM COATED ORAL at 21:42

## 2019-10-20 RX ADMIN — FAMOTIDINE 20 MG: 20 TABLET, FILM COATED ORAL at 16:37

## 2019-10-20 RX ADMIN — BACLOFEN 30 MG: 10 TABLET ORAL at 21:42

## 2019-10-20 RX ADMIN — PANTOPRAZOLE SODIUM 40 MG: 40 INJECTION, POWDER, FOR SOLUTION INTRAVENOUS at 11:17

## 2019-10-20 RX ADMIN — CHOLECALCIFEROL CAP 125 MCG (5000 UNIT) 5000 UNITS: 125 CAP at 21:43

## 2019-10-21 LAB
ALBUMIN SERPL-MCNC: 3.22 G/DL (ref 3.5–5.2)
ALBUMIN/GLOB SERPL: 0.8 G/DL
ALP SERPL-CCNC: 75 U/L (ref 39–117)
ALT SERPL W P-5'-P-CCNC: 40 U/L (ref 1–41)
ANION GAP SERPL CALCULATED.3IONS-SCNC: 11.9 MMOL/L (ref 5–15)
AST SERPL-CCNC: 31 U/L (ref 1–40)
BASOPHILS # BLD AUTO: 0.02 10*3/MM3 (ref 0–0.2)
BASOPHILS NFR BLD AUTO: 0.2 % (ref 0–1.5)
BILIRUB SERPL-MCNC: 0.2 MG/DL (ref 0.2–1.2)
BUN BLD-MCNC: 14 MG/DL (ref 6–20)
BUN/CREAT SERPL: 17.1 (ref 7–25)
CALCIUM SPEC-SCNC: 8.6 MG/DL (ref 8.6–10.5)
CHLORIDE SERPL-SCNC: 102 MMOL/L (ref 98–107)
CO2 SERPL-SCNC: 20.1 MMOL/L (ref 22–29)
CREAT BLD-MCNC: 0.82 MG/DL (ref 0.76–1.27)
CRP SERPL-MCNC: 3.13 MG/DL (ref 0–0.5)
DEPRECATED RDW RBC AUTO: 51.2 FL (ref 37–54)
EOSINOPHIL # BLD AUTO: 0.28 10*3/MM3 (ref 0–0.4)
EOSINOPHIL NFR BLD AUTO: 3.1 % (ref 0.3–6.2)
ERYTHROCYTE [DISTWIDTH] IN BLOOD BY AUTOMATED COUNT: 16.8 % (ref 12.3–15.4)
GFR SERPL CREATININE-BSD FRML MDRD: 103 ML/MIN/1.73
GLOBULIN UR ELPH-MCNC: 4.3 GM/DL
GLUCOSE BLD-MCNC: 305 MG/DL (ref 65–99)
GLUCOSE BLDC GLUCOMTR-MCNC: 194 MG/DL (ref 70–130)
GLUCOSE BLDC GLUCOMTR-MCNC: 264 MG/DL (ref 70–130)
GLUCOSE BLDC GLUCOMTR-MCNC: 265 MG/DL (ref 70–130)
GLUCOSE BLDC GLUCOMTR-MCNC: 275 MG/DL (ref 70–130)
GLUCOSE BLDC GLUCOMTR-MCNC: 332 MG/DL (ref 70–130)
GLUCOSE BLDC GLUCOMTR-MCNC: 347 MG/DL (ref 70–130)
HCT VFR BLD AUTO: 36.2 % (ref 37.5–51)
HGB BLD-MCNC: 10.6 G/DL (ref 13–17.7)
IMM GRANULOCYTES # BLD AUTO: 0.02 10*3/MM3 (ref 0–0.05)
IMM GRANULOCYTES NFR BLD AUTO: 0.2 % (ref 0–0.5)
LYMPHOCYTES # BLD AUTO: 1.97 10*3/MM3 (ref 0.7–3.1)
LYMPHOCYTES NFR BLD AUTO: 21.9 % (ref 19.6–45.3)
MAGNESIUM SERPL-MCNC: 1.6 MG/DL (ref 1.6–2.6)
MCH RBC QN AUTO: 24.2 PG (ref 26.6–33)
MCHC RBC AUTO-ENTMCNC: 29.3 G/DL (ref 31.5–35.7)
MCV RBC AUTO: 82.6 FL (ref 79–97)
MONOCYTES # BLD AUTO: 0.31 10*3/MM3 (ref 0.1–0.9)
MONOCYTES NFR BLD AUTO: 3.4 % (ref 5–12)
NEUTROPHILS # BLD AUTO: 6.4 10*3/MM3 (ref 1.7–7)
NEUTROPHILS NFR BLD AUTO: 71.2 % (ref 42.7–76)
NRBC BLD AUTO-RTO: 0 /100 WBC (ref 0–0.2)
PLATELET # BLD AUTO: 385 10*3/MM3 (ref 140–450)
PMV BLD AUTO: 10.5 FL (ref 6–12)
POTASSIUM BLD-SCNC: 3.4 MMOL/L (ref 3.5–5.2)
PROT SERPL-MCNC: 7.5 G/DL (ref 6–8.5)
RBC # BLD AUTO: 4.38 10*6/MM3 (ref 4.14–5.8)
SODIUM BLD-SCNC: 134 MMOL/L (ref 136–145)
WBC NRBC COR # BLD: 9 10*3/MM3 (ref 3.4–10.8)

## 2019-10-21 PROCEDURE — 25010000002 CEFEPIME PER 500 MG: Performed by: NURSE PRACTITIONER

## 2019-10-21 PROCEDURE — 86140 C-REACTIVE PROTEIN: CPT | Performed by: INTERNAL MEDICINE

## 2019-10-21 PROCEDURE — 25010000002 CEFEPIME 2 G/NS 100 ML SOLUTION: Performed by: NURSE PRACTITIONER

## 2019-10-21 PROCEDURE — 85025 COMPLETE CBC W/AUTO DIFF WBC: CPT | Performed by: INTERNAL MEDICINE

## 2019-10-21 PROCEDURE — 94799 UNLISTED PULMONARY SVC/PX: CPT

## 2019-10-21 PROCEDURE — 63710000001 INSULIN ASPART PER 5 UNITS: Performed by: HOSPITALIST

## 2019-10-21 PROCEDURE — 82962 GLUCOSE BLOOD TEST: CPT

## 2019-10-21 PROCEDURE — 63710000001 INSULIN DETEMIR PER 5 UNITS: Performed by: HOSPITALIST

## 2019-10-21 PROCEDURE — 83735 ASSAY OF MAGNESIUM: CPT | Performed by: INTERNAL MEDICINE

## 2019-10-21 PROCEDURE — 99233 SBSQ HOSP IP/OBS HIGH 50: CPT | Performed by: INTERNAL MEDICINE

## 2019-10-21 PROCEDURE — 80053 COMPREHEN METABOLIC PANEL: CPT | Performed by: INTERNAL MEDICINE

## 2019-10-21 PROCEDURE — 25010000002 VANCOMYCIN 5 G RECONSTITUTED SOLUTION 5,000 MG VIAL: Performed by: NURSE PRACTITIONER

## 2019-10-21 RX ADMIN — BACLOFEN 30 MG: 10 TABLET ORAL at 17:38

## 2019-10-21 RX ADMIN — CASTOR OIL AND BALSAM, PERU: 788; 87 OINTMENT TOPICAL at 21:57

## 2019-10-21 RX ADMIN — FAMOTIDINE 20 MG: 20 TABLET, FILM COATED ORAL at 17:38

## 2019-10-21 RX ADMIN — INSULIN ASPART 10 UNITS: 100 INJECTION, SOLUTION INTRAVENOUS; SUBCUTANEOUS at 21:58

## 2019-10-21 RX ADMIN — APIXABAN 5 MG: 5 TABLET, FILM COATED ORAL at 08:43

## 2019-10-21 RX ADMIN — SUCRALFATE 1 G: 1 TABLET ORAL at 08:43

## 2019-10-21 RX ADMIN — GABAPENTIN 800 MG: 400 CAPSULE ORAL at 14:19

## 2019-10-21 RX ADMIN — SODIUM CHLORIDE, PRESERVATIVE FREE 10 ML: 5 INJECTION INTRAVENOUS at 08:43

## 2019-10-21 RX ADMIN — CEFEPIME 2 G: 2 INJECTION, POWDER, FOR SOLUTION INTRAVENOUS at 17:38

## 2019-10-21 RX ADMIN — APIXABAN 5 MG: 5 TABLET, FILM COATED ORAL at 21:56

## 2019-10-21 RX ADMIN — MODAFINIL 200 MG: 100 TABLET ORAL at 08:43

## 2019-10-21 RX ADMIN — ARIPIPRAZOLE 10 MG: 10 TABLET ORAL at 21:57

## 2019-10-21 RX ADMIN — INSULIN ASPART 8 UNITS: 100 INJECTION, SOLUTION INTRAVENOUS; SUBCUTANEOUS at 17:39

## 2019-10-21 RX ADMIN — SODIUM CHLORIDE, PRESERVATIVE FREE 10 ML: 5 INJECTION INTRAVENOUS at 21:57

## 2019-10-21 RX ADMIN — CEFEPIME 2 G: 2 INJECTION, POWDER, FOR SOLUTION INTRAVENOUS at 08:41

## 2019-10-21 RX ADMIN — CHOLECALCIFEROL CAP 125 MCG (5000 UNIT) 5000 UNITS: 125 CAP at 21:56

## 2019-10-21 RX ADMIN — GABAPENTIN 800 MG: 400 CAPSULE ORAL at 21:56

## 2019-10-21 RX ADMIN — SODIUM HYPOCHLORITE: 1.25 SOLUTION TOPICAL at 14:19

## 2019-10-21 RX ADMIN — SUCRALFATE 1 G: 1 TABLET ORAL at 17:38

## 2019-10-21 RX ADMIN — CASTOR OIL AND BALSAM, PERU: 788; 87 OINTMENT TOPICAL at 14:19

## 2019-10-21 RX ADMIN — SUCRALFATE 1 G: 1 TABLET ORAL at 21:56

## 2019-10-21 RX ADMIN — BACLOFEN 30 MG: 10 TABLET ORAL at 08:43

## 2019-10-21 RX ADMIN — SUCRALFATE 1 G: 1 TABLET ORAL at 12:26

## 2019-10-21 RX ADMIN — BACLOFEN 30 MG: 10 TABLET ORAL at 21:57

## 2019-10-21 RX ADMIN — INSULIN ASPART 8 UNITS: 100 INJECTION, SOLUTION INTRAVENOUS; SUBCUTANEOUS at 12:26

## 2019-10-21 RX ADMIN — DULOXETINE HYDROCHLORIDE 60 MG: 60 CAPSULE, DELAYED RELEASE ORAL at 08:43

## 2019-10-21 RX ADMIN — BACLOFEN 30 MG: 10 TABLET ORAL at 12:26

## 2019-10-21 RX ADMIN — VANCOMYCIN HYDROCHLORIDE 1750 MG: 5 INJECTION, POWDER, LYOPHILIZED, FOR SOLUTION INTRAVENOUS at 17:38

## 2019-10-21 RX ADMIN — Medication 1 CAPSULE: at 08:43

## 2019-10-21 RX ADMIN — DULOXETINE HYDROCHLORIDE 60 MG: 60 CAPSULE, DELAYED RELEASE ORAL at 21:56

## 2019-10-21 RX ADMIN — CEFEPIME HYDROCHLORIDE: 2 INJECTION, POWDER, FOR SOLUTION INTRAVENOUS at 01:33

## 2019-10-21 RX ADMIN — VANCOMYCIN HYDROCHLORIDE 1750 MG: 5 INJECTION, POWDER, LYOPHILIZED, FOR SOLUTION INTRAVENOUS at 05:15

## 2019-10-21 RX ADMIN — CASTOR OIL AND BALSAM, PERU: 788; 87 OINTMENT TOPICAL at 08:43

## 2019-10-21 RX ADMIN — SODIUM HYPOCHLORITE: 1.25 SOLUTION TOPICAL at 01:51

## 2019-10-21 RX ADMIN — FAMOTIDINE 20 MG: 20 TABLET, FILM COATED ORAL at 08:43

## 2019-10-21 RX ADMIN — INSULIN ASPART 3 UNITS: 100 INJECTION, SOLUTION INTRAVENOUS; SUBCUTANEOUS at 08:43

## 2019-10-21 RX ADMIN — INSULIN DETEMIR 20 UNITS: 100 INJECTION, SOLUTION SUBCUTANEOUS at 21:58

## 2019-10-21 RX ADMIN — ATORVASTATIN CALCIUM 10 MG: 10 TABLET, FILM COATED ORAL at 21:56

## 2019-10-21 RX ADMIN — GABAPENTIN 800 MG: 400 CAPSULE ORAL at 05:16

## 2019-10-22 LAB
ANION GAP SERPL CALCULATED.3IONS-SCNC: 9.7 MMOL/L (ref 5–15)
BASOPHILS # BLD AUTO: 0.02 10*3/MM3 (ref 0–0.2)
BASOPHILS NFR BLD AUTO: 0.2 % (ref 0–1.5)
BUN BLD-MCNC: 13 MG/DL (ref 6–20)
BUN/CREAT SERPL: 16 (ref 7–25)
CALCIUM SPEC-SCNC: 8.6 MG/DL (ref 8.6–10.5)
CHLORIDE SERPL-SCNC: 101 MMOL/L (ref 98–107)
CO2 SERPL-SCNC: 24.3 MMOL/L (ref 22–29)
CREAT BLD-MCNC: 0.81 MG/DL (ref 0.76–1.27)
CRP SERPL-MCNC: 2.92 MG/DL (ref 0–0.5)
DEPRECATED RDW RBC AUTO: 49 FL (ref 37–54)
EOSINOPHIL # BLD AUTO: 0.26 10*3/MM3 (ref 0–0.4)
EOSINOPHIL NFR BLD AUTO: 2.9 % (ref 0.3–6.2)
ERYTHROCYTE [DISTWIDTH] IN BLOOD BY AUTOMATED COUNT: 16.5 % (ref 12.3–15.4)
GFR SERPL CREATININE-BSD FRML MDRD: 105 ML/MIN/1.73
GLUCOSE BLD-MCNC: 412 MG/DL (ref 65–99)
GLUCOSE BLDC GLUCOMTR-MCNC: 227 MG/DL (ref 70–130)
GLUCOSE BLDC GLUCOMTR-MCNC: 263 MG/DL (ref 70–130)
GLUCOSE BLDC GLUCOMTR-MCNC: 274 MG/DL (ref 70–130)
GLUCOSE BLDC GLUCOMTR-MCNC: 279 MG/DL (ref 70–130)
GLUCOSE BLDC GLUCOMTR-MCNC: 305 MG/DL (ref 70–130)
GLUCOSE BLDC GLUCOMTR-MCNC: 334 MG/DL (ref 70–130)
HCT VFR BLD AUTO: 32.5 % (ref 37.5–51)
HGB BLD-MCNC: 9.8 G/DL (ref 13–17.7)
IMM GRANULOCYTES # BLD AUTO: 0.02 10*3/MM3 (ref 0–0.05)
IMM GRANULOCYTES NFR BLD AUTO: 0.2 % (ref 0–0.5)
LYMPHOCYTES # BLD AUTO: 1.78 10*3/MM3 (ref 0.7–3.1)
LYMPHOCYTES NFR BLD AUTO: 19.8 % (ref 19.6–45.3)
MCH RBC QN AUTO: 24.4 PG (ref 26.6–33)
MCHC RBC AUTO-ENTMCNC: 30.2 G/DL (ref 31.5–35.7)
MCV RBC AUTO: 81 FL (ref 79–97)
MONOCYTES # BLD AUTO: 0.39 10*3/MM3 (ref 0.1–0.9)
MONOCYTES NFR BLD AUTO: 4.3 % (ref 5–12)
NEUTROPHILS # BLD AUTO: 6.5 10*3/MM3 (ref 1.7–7)
NEUTROPHILS NFR BLD AUTO: 72.6 % (ref 42.7–76)
NRBC BLD AUTO-RTO: 0 /100 WBC (ref 0–0.2)
PLATELET # BLD AUTO: 347 10*3/MM3 (ref 140–450)
PMV BLD AUTO: 10.2 FL (ref 6–12)
POTASSIUM BLD-SCNC: 3.8 MMOL/L (ref 3.5–5.2)
RBC # BLD AUTO: 4.01 10*6/MM3 (ref 4.14–5.8)
SODIUM BLD-SCNC: 135 MMOL/L (ref 136–145)
VANCOMYCIN TROUGH SERPL-MCNC: 11.9 MCG/ML (ref 5–20)
WBC NRBC COR # BLD: 8.97 10*3/MM3 (ref 3.4–10.8)

## 2019-10-22 PROCEDURE — 63710000001 INSULIN ASPART PER 5 UNITS: Performed by: PHYSICIAN ASSISTANT

## 2019-10-22 PROCEDURE — 86140 C-REACTIVE PROTEIN: CPT | Performed by: INTERNAL MEDICINE

## 2019-10-22 PROCEDURE — 85025 COMPLETE CBC W/AUTO DIFF WBC: CPT | Performed by: INTERNAL MEDICINE

## 2019-10-22 PROCEDURE — 80202 ASSAY OF VANCOMYCIN: CPT

## 2019-10-22 PROCEDURE — 25010000002 VANCOMYCIN 5 G RECONSTITUTED SOLUTION 5,000 MG VIAL: Performed by: NURSE PRACTITIONER

## 2019-10-22 PROCEDURE — 82962 GLUCOSE BLOOD TEST: CPT

## 2019-10-22 PROCEDURE — 63710000001 INSULIN ASPART PER 5 UNITS: Performed by: INTERNAL MEDICINE

## 2019-10-22 PROCEDURE — 99232 SBSQ HOSP IP/OBS MODERATE 35: CPT | Performed by: INTERNAL MEDICINE

## 2019-10-22 PROCEDURE — 63710000001 INSULIN ASPART PER 5 UNITS: Performed by: HOSPITALIST

## 2019-10-22 PROCEDURE — 80048 BASIC METABOLIC PNL TOTAL CA: CPT | Performed by: INTERNAL MEDICINE

## 2019-10-22 PROCEDURE — 25010000002 CEFEPIME 2 G/NS 100 ML SOLUTION: Performed by: NURSE PRACTITIONER

## 2019-10-22 PROCEDURE — 63710000001 INSULIN DETEMIR PER 5 UNITS: Performed by: INTERNAL MEDICINE

## 2019-10-22 RX ORDER — SODIUM CHLORIDE 9 MG/ML
100 INJECTION, SOLUTION INTRAVENOUS CONTINUOUS
Status: DISCONTINUED | OUTPATIENT
Start: 2019-10-22 | End: 2019-10-27 | Stop reason: HOSPADM

## 2019-10-22 RX ORDER — METOPROLOL TARTRATE 5 MG/5ML
2.5 INJECTION INTRAVENOUS EVERY 6 HOURS PRN
Status: DISCONTINUED | OUTPATIENT
Start: 2019-10-22 | End: 2019-10-27 | Stop reason: HOSPADM

## 2019-10-22 RX ADMIN — BACLOFEN 30 MG: 10 TABLET ORAL at 17:42

## 2019-10-22 RX ADMIN — INSULIN DETEMIR 15 UNITS: 100 INJECTION, SOLUTION SUBCUTANEOUS at 14:10

## 2019-10-22 RX ADMIN — INSULIN ASPART 8 UNITS: 100 INJECTION, SOLUTION INTRAVENOUS; SUBCUTANEOUS at 17:42

## 2019-10-22 RX ADMIN — CEFEPIME 2 G: 2 INJECTION, POWDER, FOR SOLUTION INTRAVENOUS at 07:58

## 2019-10-22 RX ADMIN — CASTOR OIL AND BALSAM, PERU: 788; 87 OINTMENT TOPICAL at 07:59

## 2019-10-22 RX ADMIN — VANCOMYCIN HYDROCHLORIDE 1750 MG: 5 INJECTION, POWDER, LYOPHILIZED, FOR SOLUTION INTRAVENOUS at 03:47

## 2019-10-22 RX ADMIN — SODIUM CHLORIDE 100 ML/HR: 9 INJECTION, SOLUTION INTRAVENOUS at 21:51

## 2019-10-22 RX ADMIN — Medication 1 CAPSULE: at 07:58

## 2019-10-22 RX ADMIN — APIXABAN 5 MG: 5 TABLET, FILM COATED ORAL at 22:13

## 2019-10-22 RX ADMIN — DULOXETINE HYDROCHLORIDE 60 MG: 60 CAPSULE, DELAYED RELEASE ORAL at 07:57

## 2019-10-22 RX ADMIN — INSULIN ASPART 5 UNITS: 100 INJECTION, SOLUTION INTRAVENOUS; SUBCUTANEOUS at 03:47

## 2019-10-22 RX ADMIN — APIXABAN 5 MG: 5 TABLET, FILM COATED ORAL at 07:58

## 2019-10-22 RX ADMIN — GABAPENTIN 800 MG: 400 CAPSULE ORAL at 05:53

## 2019-10-22 RX ADMIN — SUCRALFATE 1 G: 1 TABLET ORAL at 12:02

## 2019-10-22 RX ADMIN — VANCOMYCIN HYDROCHLORIDE 2000 MG: 5 INJECTION, POWDER, LYOPHILIZED, FOR SOLUTION INTRAVENOUS at 17:42

## 2019-10-22 RX ADMIN — GABAPENTIN 800 MG: 400 CAPSULE ORAL at 14:10

## 2019-10-22 RX ADMIN — CEFEPIME 2 G: 2 INJECTION, POWDER, FOR SOLUTION INTRAVENOUS at 23:55

## 2019-10-22 RX ADMIN — INSULIN ASPART 10 UNITS: 100 INJECTION, SOLUTION INTRAVENOUS; SUBCUTANEOUS at 17:43

## 2019-10-22 RX ADMIN — ATORVASTATIN CALCIUM 10 MG: 10 TABLET, FILM COATED ORAL at 22:13

## 2019-10-22 RX ADMIN — SUCRALFATE 1 G: 1 TABLET ORAL at 08:01

## 2019-10-22 RX ADMIN — SUCRALFATE 1 G: 1 TABLET ORAL at 17:42

## 2019-10-22 RX ADMIN — CASTOR OIL AND BALSAM, PERU: 788; 87 OINTMENT TOPICAL at 22:14

## 2019-10-22 RX ADMIN — INSULIN ASPART 10 UNITS: 100 INJECTION, SOLUTION INTRAVENOUS; SUBCUTANEOUS at 12:02

## 2019-10-22 RX ADMIN — GABAPENTIN 800 MG: 400 CAPSULE ORAL at 22:14

## 2019-10-22 RX ADMIN — INSULIN ASPART 8 UNITS: 100 INJECTION, SOLUTION INTRAVENOUS; SUBCUTANEOUS at 08:00

## 2019-10-22 RX ADMIN — CHOLECALCIFEROL CAP 125 MCG (5000 UNIT) 5000 UNITS: 125 CAP at 22:13

## 2019-10-22 RX ADMIN — ARIPIPRAZOLE 10 MG: 10 TABLET ORAL at 22:13

## 2019-10-22 RX ADMIN — SUCRALFATE 1 G: 1 TABLET ORAL at 22:13

## 2019-10-22 RX ADMIN — INSULIN ASPART 5 UNITS: 100 INJECTION, SOLUTION INTRAVENOUS; SUBCUTANEOUS at 08:00

## 2019-10-22 RX ADMIN — SODIUM CHLORIDE, PRESERVATIVE FREE 10 ML: 5 INJECTION INTRAVENOUS at 07:59

## 2019-10-22 RX ADMIN — CEFEPIME 2 G: 2 INJECTION, POWDER, FOR SOLUTION INTRAVENOUS at 01:12

## 2019-10-22 RX ADMIN — SODIUM CHLORIDE 100 ML/HR: 9 INJECTION, SOLUTION INTRAVENOUS at 03:07

## 2019-10-22 RX ADMIN — BACLOFEN 30 MG: 10 TABLET ORAL at 12:01

## 2019-10-22 RX ADMIN — CEFEPIME 2 G: 2 INJECTION, POWDER, FOR SOLUTION INTRAVENOUS at 17:43

## 2019-10-22 RX ADMIN — INSULIN ASPART 5 UNITS: 100 INJECTION, SOLUTION INTRAVENOUS; SUBCUTANEOUS at 22:14

## 2019-10-22 RX ADMIN — INSULIN DETEMIR 15 UNITS: 100 INJECTION, SOLUTION SUBCUTANEOUS at 22:14

## 2019-10-22 RX ADMIN — SODIUM HYPOCHLORITE: 1.25 SOLUTION TOPICAL at 01:12

## 2019-10-22 RX ADMIN — BACLOFEN 30 MG: 10 TABLET ORAL at 07:58

## 2019-10-22 RX ADMIN — MODAFINIL 200 MG: 100 TABLET ORAL at 07:58

## 2019-10-22 RX ADMIN — FAMOTIDINE 20 MG: 20 TABLET, FILM COATED ORAL at 17:42

## 2019-10-22 RX ADMIN — SODIUM HYPOCHLORITE: 1.25 SOLUTION TOPICAL at 12:02

## 2019-10-22 RX ADMIN — FAMOTIDINE 20 MG: 20 TABLET, FILM COATED ORAL at 07:58

## 2019-10-22 RX ADMIN — BACLOFEN 30 MG: 10 TABLET ORAL at 22:13

## 2019-10-22 RX ADMIN — INSULIN ASPART 8 UNITS: 100 INJECTION, SOLUTION INTRAVENOUS; SUBCUTANEOUS at 12:02

## 2019-10-22 RX ADMIN — DULOXETINE HYDROCHLORIDE 60 MG: 60 CAPSULE, DELAYED RELEASE ORAL at 22:13

## 2019-10-23 LAB
ANION GAP SERPL CALCULATED.3IONS-SCNC: 10.1 MMOL/L (ref 5–15)
BASOPHILS # BLD AUTO: 0.03 10*3/MM3 (ref 0–0.2)
BASOPHILS NFR BLD AUTO: 0.4 % (ref 0–1.5)
BUN BLD-MCNC: 9 MG/DL (ref 6–20)
BUN/CREAT SERPL: 12.9 (ref 7–25)
CALCIUM SPEC-SCNC: 8.4 MG/DL (ref 8.6–10.5)
CHLORIDE SERPL-SCNC: 104 MMOL/L (ref 98–107)
CO2 SERPL-SCNC: 24.9 MMOL/L (ref 22–29)
CREAT BLD-MCNC: 0.7 MG/DL (ref 0.76–1.27)
CRP SERPL-MCNC: 2.83 MG/DL (ref 0–0.5)
DEPRECATED RDW RBC AUTO: 49.3 FL (ref 37–54)
EOSINOPHIL # BLD AUTO: 0.25 10*3/MM3 (ref 0–0.4)
EOSINOPHIL NFR BLD AUTO: 3.1 % (ref 0.3–6.2)
ERYTHROCYTE [DISTWIDTH] IN BLOOD BY AUTOMATED COUNT: 16.3 % (ref 12.3–15.4)
GFR SERPL CREATININE-BSD FRML MDRD: 124 ML/MIN/1.73
GLUCOSE BLD-MCNC: 381 MG/DL (ref 65–99)
GLUCOSE BLDC GLUCOMTR-MCNC: 251 MG/DL (ref 70–130)
GLUCOSE BLDC GLUCOMTR-MCNC: 269 MG/DL (ref 70–130)
GLUCOSE BLDC GLUCOMTR-MCNC: 283 MG/DL (ref 70–130)
GLUCOSE BLDC GLUCOMTR-MCNC: 287 MG/DL (ref 70–130)
GLUCOSE BLDC GLUCOMTR-MCNC: 291 MG/DL (ref 70–130)
HCT VFR BLD AUTO: 34 % (ref 37.5–51)
HGB BLD-MCNC: 9.9 G/DL (ref 13–17.7)
IMM GRANULOCYTES # BLD AUTO: 0.03 10*3/MM3 (ref 0–0.05)
IMM GRANULOCYTES NFR BLD AUTO: 0.4 % (ref 0–0.5)
LYMPHOCYTES # BLD AUTO: 1.76 10*3/MM3 (ref 0.7–3.1)
LYMPHOCYTES NFR BLD AUTO: 22.1 % (ref 19.6–45.3)
MCH RBC QN AUTO: 24.2 PG (ref 26.6–33)
MCHC RBC AUTO-ENTMCNC: 29.1 G/DL (ref 31.5–35.7)
MCV RBC AUTO: 83.1 FL (ref 79–97)
MONOCYTES # BLD AUTO: 0.3 10*3/MM3 (ref 0.1–0.9)
MONOCYTES NFR BLD AUTO: 3.8 % (ref 5–12)
NEUTROPHILS # BLD AUTO: 5.58 10*3/MM3 (ref 1.7–7)
NEUTROPHILS NFR BLD AUTO: 70.2 % (ref 42.7–76)
NRBC BLD AUTO-RTO: 0 /100 WBC (ref 0–0.2)
PLATELET # BLD AUTO: 329 10*3/MM3 (ref 140–450)
PMV BLD AUTO: 9.8 FL (ref 6–12)
POTASSIUM BLD-SCNC: 3.9 MMOL/L (ref 3.5–5.2)
RBC # BLD AUTO: 4.09 10*6/MM3 (ref 4.14–5.8)
SODIUM BLD-SCNC: 139 MMOL/L (ref 136–145)
WBC NRBC COR # BLD: 7.95 10*3/MM3 (ref 3.4–10.8)

## 2019-10-23 PROCEDURE — 63710000001 INSULIN DETEMIR PER 5 UNITS: Performed by: INTERNAL MEDICINE

## 2019-10-23 PROCEDURE — 25010000002 CEFEPIME 2 G/NS 100 ML SOLUTION: Performed by: NURSE PRACTITIONER

## 2019-10-23 PROCEDURE — 25010000002 VANCOMYCIN 5 G RECONSTITUTED SOLUTION 5,000 MG VIAL: Performed by: NURSE PRACTITIONER

## 2019-10-23 PROCEDURE — 85025 COMPLETE CBC W/AUTO DIFF WBC: CPT | Performed by: INTERNAL MEDICINE

## 2019-10-23 PROCEDURE — 80048 BASIC METABOLIC PNL TOTAL CA: CPT | Performed by: INTERNAL MEDICINE

## 2019-10-23 PROCEDURE — 63710000001 INSULIN ASPART PER 5 UNITS: Performed by: INTERNAL MEDICINE

## 2019-10-23 PROCEDURE — 63710000001 INSULIN ASPART PER 5 UNITS: Performed by: HOSPITALIST

## 2019-10-23 PROCEDURE — 99232 SBSQ HOSP IP/OBS MODERATE 35: CPT | Performed by: INTERNAL MEDICINE

## 2019-10-23 PROCEDURE — 82962 GLUCOSE BLOOD TEST: CPT

## 2019-10-23 PROCEDURE — 86140 C-REACTIVE PROTEIN: CPT | Performed by: INTERNAL MEDICINE

## 2019-10-23 RX ADMIN — VANCOMYCIN HYDROCHLORIDE 2000 MG: 5 INJECTION, POWDER, LYOPHILIZED, FOR SOLUTION INTRAVENOUS at 17:30

## 2019-10-23 RX ADMIN — INSULIN ASPART 14 UNITS: 100 INJECTION, SOLUTION INTRAVENOUS; SUBCUTANEOUS at 07:54

## 2019-10-23 RX ADMIN — INSULIN ASPART 8 UNITS: 100 INJECTION, SOLUTION INTRAVENOUS; SUBCUTANEOUS at 17:30

## 2019-10-23 RX ADMIN — APIXABAN 5 MG: 5 TABLET, FILM COATED ORAL at 07:51

## 2019-10-23 RX ADMIN — APIXABAN 5 MG: 5 TABLET, FILM COATED ORAL at 21:05

## 2019-10-23 RX ADMIN — INSULIN ASPART 14 UNITS: 100 INJECTION, SOLUTION INTRAVENOUS; SUBCUTANEOUS at 17:30

## 2019-10-23 RX ADMIN — GABAPENTIN 800 MG: 400 CAPSULE ORAL at 05:36

## 2019-10-23 RX ADMIN — CASTOR OIL AND BALSAM, PERU: 788; 87 OINTMENT TOPICAL at 21:04

## 2019-10-23 RX ADMIN — INSULIN DETEMIR 20 UNITS: 100 INJECTION, SOLUTION SUBCUTANEOUS at 07:51

## 2019-10-23 RX ADMIN — SODIUM HYPOCHLORITE: 1.25 SOLUTION TOPICAL at 11:31

## 2019-10-23 RX ADMIN — BACLOFEN 30 MG: 10 TABLET ORAL at 17:30

## 2019-10-23 RX ADMIN — BACLOFEN 30 MG: 10 TABLET ORAL at 21:12

## 2019-10-23 RX ADMIN — SODIUM CHLORIDE, PRESERVATIVE FREE 10 ML: 5 INJECTION INTRAVENOUS at 07:53

## 2019-10-23 RX ADMIN — SUCRALFATE 1 G: 1 TABLET ORAL at 21:05

## 2019-10-23 RX ADMIN — DULOXETINE HYDROCHLORIDE 60 MG: 60 CAPSULE, DELAYED RELEASE ORAL at 21:05

## 2019-10-23 RX ADMIN — SODIUM HYPOCHLORITE: 1.25 SOLUTION TOPICAL at 02:08

## 2019-10-23 RX ADMIN — Medication 1 CAPSULE: at 07:51

## 2019-10-23 RX ADMIN — INSULIN ASPART 8 UNITS: 100 INJECTION, SOLUTION INTRAVENOUS; SUBCUTANEOUS at 11:32

## 2019-10-23 RX ADMIN — BACLOFEN 30 MG: 10 TABLET ORAL at 07:51

## 2019-10-23 RX ADMIN — CEFEPIME 2 G: 2 INJECTION, POWDER, FOR SOLUTION INTRAVENOUS at 13:55

## 2019-10-23 RX ADMIN — DULOXETINE HYDROCHLORIDE 60 MG: 60 CAPSULE, DELAYED RELEASE ORAL at 07:52

## 2019-10-23 RX ADMIN — SODIUM CHLORIDE, PRESERVATIVE FREE 10 ML: 5 INJECTION INTRAVENOUS at 21:06

## 2019-10-23 RX ADMIN — ATORVASTATIN CALCIUM 10 MG: 10 TABLET, FILM COATED ORAL at 21:05

## 2019-10-23 RX ADMIN — FAMOTIDINE 20 MG: 20 TABLET, FILM COATED ORAL at 07:52

## 2019-10-23 RX ADMIN — CASTOR OIL AND BALSAM, PERU: 788; 87 OINTMENT TOPICAL at 07:52

## 2019-10-23 RX ADMIN — CEFEPIME 2 G: 2 INJECTION, POWDER, FOR SOLUTION INTRAVENOUS at 07:52

## 2019-10-23 RX ADMIN — INSULIN DETEMIR 20 UNITS: 100 INJECTION, SOLUTION SUBCUTANEOUS at 21:14

## 2019-10-23 RX ADMIN — BACLOFEN 30 MG: 10 TABLET ORAL at 11:31

## 2019-10-23 RX ADMIN — MODAFINIL 200 MG: 100 TABLET ORAL at 07:51

## 2019-10-23 RX ADMIN — SUCRALFATE 1 G: 1 TABLET ORAL at 07:51

## 2019-10-23 RX ADMIN — SUCRALFATE 1 G: 1 TABLET ORAL at 17:30

## 2019-10-23 RX ADMIN — SUCRALFATE 1 G: 1 TABLET ORAL at 11:31

## 2019-10-23 RX ADMIN — GABAPENTIN 800 MG: 400 CAPSULE ORAL at 21:05

## 2019-10-23 RX ADMIN — INSULIN ASPART 14 UNITS: 100 INJECTION, SOLUTION INTRAVENOUS; SUBCUTANEOUS at 11:31

## 2019-10-23 RX ADMIN — FAMOTIDINE 20 MG: 20 TABLET, FILM COATED ORAL at 17:31

## 2019-10-23 RX ADMIN — GABAPENTIN 800 MG: 400 CAPSULE ORAL at 13:54

## 2019-10-23 RX ADMIN — ARIPIPRAZOLE 10 MG: 10 TABLET ORAL at 21:05

## 2019-10-23 RX ADMIN — INSULIN ASPART 8 UNITS: 100 INJECTION, SOLUTION INTRAVENOUS; SUBCUTANEOUS at 07:53

## 2019-10-23 RX ADMIN — INSULIN ASPART 5 UNITS: 100 INJECTION, SOLUTION INTRAVENOUS; SUBCUTANEOUS at 21:04

## 2019-10-23 RX ADMIN — CHOLECALCIFEROL CAP 125 MCG (5000 UNIT) 5000 UNITS: 125 CAP at 21:05

## 2019-10-23 RX ADMIN — VANCOMYCIN HYDROCHLORIDE 2000 MG: 5 INJECTION, POWDER, LYOPHILIZED, FOR SOLUTION INTRAVENOUS at 03:59

## 2019-10-24 LAB
ANION GAP SERPL CALCULATED.3IONS-SCNC: 9.8 MMOL/L (ref 5–15)
BACTERIA SPEC AEROBE CULT: NORMAL
BACTERIA SPEC AEROBE CULT: NORMAL
BASOPHILS # BLD AUTO: 0.03 10*3/MM3 (ref 0–0.2)
BASOPHILS NFR BLD AUTO: 0.4 % (ref 0–1.5)
BUN BLD-MCNC: 16 MG/DL (ref 6–20)
BUN/CREAT SERPL: 21.1 (ref 7–25)
CALCIUM SPEC-SCNC: 9.1 MG/DL (ref 8.6–10.5)
CHLORIDE SERPL-SCNC: 101 MMOL/L (ref 98–107)
CO2 SERPL-SCNC: 26.2 MMOL/L (ref 22–29)
CREAT BLD-MCNC: 0.76 MG/DL (ref 0.76–1.27)
DEPRECATED RDW RBC AUTO: 47.8 FL (ref 37–54)
EOSINOPHIL # BLD AUTO: 0.19 10*3/MM3 (ref 0–0.4)
EOSINOPHIL NFR BLD AUTO: 2.3 % (ref 0.3–6.2)
ERYTHROCYTE [DISTWIDTH] IN BLOOD BY AUTOMATED COUNT: 16.1 % (ref 12.3–15.4)
GFR SERPL CREATININE-BSD FRML MDRD: 112 ML/MIN/1.73
GLUCOSE BLD-MCNC: 406 MG/DL (ref 65–99)
GLUCOSE BLDC GLUCOMTR-MCNC: 296 MG/DL (ref 70–130)
GLUCOSE BLDC GLUCOMTR-MCNC: 303 MG/DL (ref 70–130)
GLUCOSE BLDC GLUCOMTR-MCNC: 336 MG/DL (ref 70–130)
GLUCOSE BLDC GLUCOMTR-MCNC: 338 MG/DL (ref 70–130)
GLUCOSE BLDC GLUCOMTR-MCNC: 356 MG/DL (ref 70–130)
GLUCOSE BLDC GLUCOMTR-MCNC: 366 MG/DL (ref 70–130)
GLUCOSE BLDC GLUCOMTR-MCNC: 374 MG/DL (ref 70–130)
HCT VFR BLD AUTO: 34.5 % (ref 37.5–51)
HGB BLD-MCNC: 10 G/DL (ref 13–17.7)
IMM GRANULOCYTES # BLD AUTO: 0.03 10*3/MM3 (ref 0–0.05)
IMM GRANULOCYTES NFR BLD AUTO: 0.4 % (ref 0–0.5)
LYMPHOCYTES # BLD AUTO: 1.57 10*3/MM3 (ref 0.7–3.1)
LYMPHOCYTES NFR BLD AUTO: 19.3 % (ref 19.6–45.3)
MCH RBC QN AUTO: 23.5 PG (ref 26.6–33)
MCHC RBC AUTO-ENTMCNC: 29 G/DL (ref 31.5–35.7)
MCV RBC AUTO: 81.2 FL (ref 79–97)
MONOCYTES # BLD AUTO: 0.31 10*3/MM3 (ref 0.1–0.9)
MONOCYTES NFR BLD AUTO: 3.8 % (ref 5–12)
NEUTROPHILS # BLD AUTO: 6.01 10*3/MM3 (ref 1.7–7)
NEUTROPHILS NFR BLD AUTO: 73.8 % (ref 42.7–76)
NRBC BLD AUTO-RTO: 0 /100 WBC (ref 0–0.2)
PLATELET # BLD AUTO: 343 10*3/MM3 (ref 140–450)
PMV BLD AUTO: 10 FL (ref 6–12)
POTASSIUM BLD-SCNC: 4 MMOL/L (ref 3.5–5.2)
RBC # BLD AUTO: 4.25 10*6/MM3 (ref 4.14–5.8)
SODIUM BLD-SCNC: 137 MMOL/L (ref 136–145)
VANCOMYCIN TROUGH SERPL-MCNC: 13.3 MCG/ML (ref 5–20)
WBC NRBC COR # BLD: 8.14 10*3/MM3 (ref 3.4–10.8)

## 2019-10-24 PROCEDURE — 63710000001 INSULIN ASPART PER 5 UNITS: Performed by: INTERNAL MEDICINE

## 2019-10-24 PROCEDURE — 82962 GLUCOSE BLOOD TEST: CPT

## 2019-10-24 PROCEDURE — G0108 DIAB MANAGE TRN  PER INDIV: HCPCS

## 2019-10-24 PROCEDURE — 63710000001 INSULIN ASPART PER 5 UNITS: Performed by: HOSPITALIST

## 2019-10-24 PROCEDURE — 99232 SBSQ HOSP IP/OBS MODERATE 35: CPT | Performed by: INTERNAL MEDICINE

## 2019-10-24 PROCEDURE — 80202 ASSAY OF VANCOMYCIN: CPT

## 2019-10-24 PROCEDURE — 63710000001 INSULIN DETEMIR PER 5 UNITS: Performed by: INTERNAL MEDICINE

## 2019-10-24 PROCEDURE — 63710000001 INSULIN ASPART PER 5 UNITS: Performed by: PHYSICIAN ASSISTANT

## 2019-10-24 PROCEDURE — 25010000002 VANCOMYCIN 5 G RECONSTITUTED SOLUTION 5,000 MG VIAL: Performed by: NURSE PRACTITIONER

## 2019-10-24 PROCEDURE — 80048 BASIC METABOLIC PNL TOTAL CA: CPT | Performed by: INTERNAL MEDICINE

## 2019-10-24 PROCEDURE — 25010000002 CEFEPIME 2 G/NS 100 ML SOLUTION: Performed by: NURSE PRACTITIONER

## 2019-10-24 PROCEDURE — 85025 COMPLETE CBC W/AUTO DIFF WBC: CPT | Performed by: INTERNAL MEDICINE

## 2019-10-24 RX ADMIN — CEFEPIME 2 G: 2 INJECTION, POWDER, FOR SOLUTION INTRAVENOUS at 09:07

## 2019-10-24 RX ADMIN — SUCRALFATE 1 G: 1 TABLET ORAL at 22:57

## 2019-10-24 RX ADMIN — FAMOTIDINE 20 MG: 20 TABLET, FILM COATED ORAL at 09:09

## 2019-10-24 RX ADMIN — GABAPENTIN 800 MG: 400 CAPSULE ORAL at 22:01

## 2019-10-24 RX ADMIN — SODIUM CHLORIDE, PRESERVATIVE FREE 10 ML: 5 INJECTION INTRAVENOUS at 22:01

## 2019-10-24 RX ADMIN — ARIPIPRAZOLE 10 MG: 10 TABLET ORAL at 22:57

## 2019-10-24 RX ADMIN — INSULIN ASPART 10 UNITS: 100 INJECTION, SOLUTION INTRAVENOUS; SUBCUTANEOUS at 11:21

## 2019-10-24 RX ADMIN — CEFEPIME 2 G: 2 INJECTION, POWDER, FOR SOLUTION INTRAVENOUS at 00:01

## 2019-10-24 RX ADMIN — CEFEPIME 2 G: 2 INJECTION, POWDER, FOR SOLUTION INTRAVENOUS at 23:05

## 2019-10-24 RX ADMIN — Medication 1 CAPSULE: at 09:09

## 2019-10-24 RX ADMIN — SODIUM CHLORIDE, PRESERVATIVE FREE 10 ML: 5 INJECTION INTRAVENOUS at 09:13

## 2019-10-24 RX ADMIN — INSULIN ASPART 14 UNITS: 100 INJECTION, SOLUTION INTRAVENOUS; SUBCUTANEOUS at 11:20

## 2019-10-24 RX ADMIN — INSULIN ASPART 14 UNITS: 100 INJECTION, SOLUTION INTRAVENOUS; SUBCUTANEOUS at 09:12

## 2019-10-24 RX ADMIN — VANCOMYCIN HYDROCHLORIDE 2000 MG: 5 INJECTION, POWDER, LYOPHILIZED, FOR SOLUTION INTRAVENOUS at 16:59

## 2019-10-24 RX ADMIN — FAMOTIDINE 20 MG: 20 TABLET, FILM COATED ORAL at 16:59

## 2019-10-24 RX ADMIN — APIXABAN 5 MG: 5 TABLET, FILM COATED ORAL at 22:56

## 2019-10-24 RX ADMIN — CHOLECALCIFEROL CAP 125 MCG (5000 UNIT) 5000 UNITS: 125 CAP at 22:57

## 2019-10-24 RX ADMIN — CASTOR OIL AND BALSAM, PERU: 788; 87 OINTMENT TOPICAL at 22:01

## 2019-10-24 RX ADMIN — INSULIN ASPART 12 UNITS: 100 INJECTION, SOLUTION INTRAVENOUS; SUBCUTANEOUS at 09:10

## 2019-10-24 RX ADMIN — DULOXETINE HYDROCHLORIDE 60 MG: 60 CAPSULE, DELAYED RELEASE ORAL at 09:08

## 2019-10-24 RX ADMIN — MODAFINIL 200 MG: 100 TABLET ORAL at 09:09

## 2019-10-24 RX ADMIN — CASTOR OIL AND BALSAM, PERU: 788; 87 OINTMENT TOPICAL at 10:24

## 2019-10-24 RX ADMIN — INSULIN ASPART 14 UNITS: 100 INJECTION, SOLUTION INTRAVENOUS; SUBCUTANEOUS at 17:00

## 2019-10-24 RX ADMIN — BACLOFEN 30 MG: 10 TABLET ORAL at 16:59

## 2019-10-24 RX ADMIN — INSULIN ASPART 8 UNITS: 100 INJECTION, SOLUTION INTRAVENOUS; SUBCUTANEOUS at 22:00

## 2019-10-24 RX ADMIN — INSULIN DETEMIR 25 UNITS: 100 INJECTION, SOLUTION SUBCUTANEOUS at 22:01

## 2019-10-24 RX ADMIN — SUCRALFATE 1 G: 1 TABLET ORAL at 09:09

## 2019-10-24 RX ADMIN — ATORVASTATIN CALCIUM 10 MG: 10 TABLET, FILM COATED ORAL at 22:57

## 2019-10-24 RX ADMIN — SODIUM CHLORIDE 100 ML/HR: 9 INJECTION, SOLUTION INTRAVENOUS at 04:39

## 2019-10-24 RX ADMIN — SODIUM CHLORIDE 100 ML/HR: 9 INJECTION, SOLUTION INTRAVENOUS at 03:17

## 2019-10-24 RX ADMIN — VANCOMYCIN HYDROCHLORIDE 2000 MG: 5 INJECTION, POWDER, LYOPHILIZED, FOR SOLUTION INTRAVENOUS at 04:52

## 2019-10-24 RX ADMIN — SODIUM CHLORIDE 100 ML/HR: 9 INJECTION, SOLUTION INTRAVENOUS at 23:08

## 2019-10-24 RX ADMIN — GABAPENTIN 800 MG: 400 CAPSULE ORAL at 15:03

## 2019-10-24 RX ADMIN — BACLOFEN 30 MG: 10 TABLET ORAL at 09:08

## 2019-10-24 RX ADMIN — BACLOFEN 30 MG: 10 TABLET ORAL at 12:44

## 2019-10-24 RX ADMIN — INSULIN DETEMIR 20 UNITS: 100 INJECTION, SOLUTION SUBCUTANEOUS at 09:06

## 2019-10-24 RX ADMIN — CEFEPIME 2 G: 2 INJECTION, POWDER, FOR SOLUTION INTRAVENOUS at 15:04

## 2019-10-24 RX ADMIN — INSULIN ASPART 12 UNITS: 100 INJECTION, SOLUTION INTRAVENOUS; SUBCUTANEOUS at 17:00

## 2019-10-24 RX ADMIN — DULOXETINE HYDROCHLORIDE 60 MG: 60 CAPSULE, DELAYED RELEASE ORAL at 22:57

## 2019-10-24 RX ADMIN — SUCRALFATE 1 G: 1 TABLET ORAL at 12:43

## 2019-10-24 RX ADMIN — SODIUM CHLORIDE 100 ML/HR: 9 INJECTION, SOLUTION INTRAVENOUS at 12:45

## 2019-10-24 RX ADMIN — SUCRALFATE 1 G: 1 TABLET ORAL at 16:59

## 2019-10-24 RX ADMIN — GABAPENTIN 800 MG: 400 CAPSULE ORAL at 05:10

## 2019-10-24 RX ADMIN — BACLOFEN 30 MG: 10 TABLET ORAL at 22:58

## 2019-10-24 RX ADMIN — SODIUM HYPOCHLORITE: 1.25 SOLUTION TOPICAL at 03:17

## 2019-10-24 RX ADMIN — INSULIN ASPART 5 UNITS: 100 INJECTION, SOLUTION INTRAVENOUS; SUBCUTANEOUS at 05:10

## 2019-10-24 RX ADMIN — SODIUM HYPOCHLORITE: 1.25 SOLUTION TOPICAL at 12:44

## 2019-10-24 RX ADMIN — APIXABAN 5 MG: 5 TABLET, FILM COATED ORAL at 09:10

## 2019-10-25 LAB
ANION GAP SERPL CALCULATED.3IONS-SCNC: 11.9 MMOL/L (ref 5–15)
ANISOCYTOSIS BLD QL: NORMAL
BACTERIA SPEC AEROBE CULT: ABNORMAL
BACTERIA SPEC AEROBE CULT: ABNORMAL
BASOPHILS # BLD AUTO: 0.05 10*3/MM3 (ref 0–0.2)
BASOPHILS NFR BLD AUTO: 0.6 % (ref 0–1.5)
BUN BLD-MCNC: 15 MG/DL (ref 6–20)
BUN/CREAT SERPL: 21.7 (ref 7–25)
CALCIUM SPEC-SCNC: 9.1 MG/DL (ref 8.6–10.5)
CHLORIDE SERPL-SCNC: 100 MMOL/L (ref 98–107)
CO2 SERPL-SCNC: 25.1 MMOL/L (ref 22–29)
CREAT BLD-MCNC: 0.69 MG/DL (ref 0.76–1.27)
CRP SERPL-MCNC: 3.07 MG/DL (ref 0–0.5)
DEPRECATED RDW RBC AUTO: 48.5 FL (ref 37–54)
EOSINOPHIL # BLD AUTO: 0.26 10*3/MM3 (ref 0–0.4)
EOSINOPHIL NFR BLD AUTO: 3.1 % (ref 0.3–6.2)
ERYTHROCYTE [DISTWIDTH] IN BLOOD BY AUTOMATED COUNT: 16.2 % (ref 12.3–15.4)
GFR SERPL CREATININE-BSD FRML MDRD: 126 ML/MIN/1.73
GLUCOSE BLD-MCNC: 317 MG/DL (ref 65–99)
GLUCOSE BLDC GLUCOMTR-MCNC: 232 MG/DL (ref 70–130)
GLUCOSE BLDC GLUCOMTR-MCNC: 259 MG/DL (ref 70–130)
GLUCOSE BLDC GLUCOMTR-MCNC: 279 MG/DL (ref 70–130)
GLUCOSE BLDC GLUCOMTR-MCNC: 307 MG/DL (ref 70–130)
GLUCOSE BLDC GLUCOMTR-MCNC: 344 MG/DL (ref 70–130)
GLUCOSE BLDC GLUCOMTR-MCNC: 345 MG/DL (ref 70–130)
GRAM STN SPEC: ABNORMAL
GRAM STN SPEC: ABNORMAL
HCT VFR BLD AUTO: 35.6 % (ref 37.5–51)
HGB BLD-MCNC: 10.3 G/DL (ref 13–17.7)
HYPOCHROMIA BLD QL: NORMAL
IMM GRANULOCYTES # BLD AUTO: 0.04 10*3/MM3 (ref 0–0.05)
IMM GRANULOCYTES NFR BLD AUTO: 0.5 % (ref 0–0.5)
LYMPHOCYTES # BLD AUTO: 1.88 10*3/MM3 (ref 0.7–3.1)
LYMPHOCYTES NFR BLD AUTO: 22.3 % (ref 19.6–45.3)
MCH RBC QN AUTO: 23.7 PG (ref 26.6–33)
MCHC RBC AUTO-ENTMCNC: 28.9 G/DL (ref 31.5–35.7)
MCV RBC AUTO: 81.8 FL (ref 79–97)
MONOCYTES # BLD AUTO: 0.27 10*3/MM3 (ref 0.1–0.9)
MONOCYTES NFR BLD AUTO: 3.2 % (ref 5–12)
NEUTROPHILS # BLD AUTO: 5.94 10*3/MM3 (ref 1.7–7)
NEUTROPHILS NFR BLD AUTO: 70.3 % (ref 42.7–76)
NRBC BLD AUTO-RTO: 0 /100 WBC (ref 0–0.2)
PLAT MORPH BLD: NORMAL
PLATELET # BLD AUTO: 370 10*3/MM3 (ref 140–450)
PMV BLD AUTO: 10.2 FL (ref 6–12)
POTASSIUM BLD-SCNC: 4 MMOL/L (ref 3.5–5.2)
RBC # BLD AUTO: 4.35 10*6/MM3 (ref 4.14–5.8)
SODIUM BLD-SCNC: 137 MMOL/L (ref 136–145)
WBC NRBC COR # BLD: 8.44 10*3/MM3 (ref 3.4–10.8)

## 2019-10-25 PROCEDURE — 82962 GLUCOSE BLOOD TEST: CPT

## 2019-10-25 PROCEDURE — 63710000001 INSULIN ASPART PER 5 UNITS: Performed by: INTERNAL MEDICINE

## 2019-10-25 PROCEDURE — 80048 BASIC METABOLIC PNL TOTAL CA: CPT | Performed by: INTERNAL MEDICINE

## 2019-10-25 PROCEDURE — 86140 C-REACTIVE PROTEIN: CPT | Performed by: NURSE PRACTITIONER

## 2019-10-25 PROCEDURE — 63710000001 INSULIN ASPART PER 5 UNITS: Performed by: HOSPITALIST

## 2019-10-25 PROCEDURE — 63710000001 INSULIN DETEMIR PER 5 UNITS: Performed by: INTERNAL MEDICINE

## 2019-10-25 PROCEDURE — 85025 COMPLETE CBC W/AUTO DIFF WBC: CPT | Performed by: INTERNAL MEDICINE

## 2019-10-25 PROCEDURE — 99231 SBSQ HOSP IP/OBS SF/LOW 25: CPT | Performed by: INTERNAL MEDICINE

## 2019-10-25 PROCEDURE — 85007 BL SMEAR W/DIFF WBC COUNT: CPT | Performed by: INTERNAL MEDICINE

## 2019-10-25 PROCEDURE — 25010000002 CEFEPIME 2 G/NS 100 ML SOLUTION: Performed by: NURSE PRACTITIONER

## 2019-10-25 PROCEDURE — 25010000002 VANCOMYCIN 5 G RECONSTITUTED SOLUTION 5,000 MG VIAL: Performed by: NURSE PRACTITIONER

## 2019-10-25 RX ADMIN — DULOXETINE HYDROCHLORIDE 60 MG: 60 CAPSULE, DELAYED RELEASE ORAL at 09:09

## 2019-10-25 RX ADMIN — INSULIN DETEMIR 30 UNITS: 100 INJECTION, SOLUTION SUBCUTANEOUS at 21:14

## 2019-10-25 RX ADMIN — CASTOR OIL AND BALSAM, PERU: 788; 87 OINTMENT TOPICAL at 09:15

## 2019-10-25 RX ADMIN — CEFEPIME 2 G: 2 INJECTION, POWDER, FOR SOLUTION INTRAVENOUS at 18:29

## 2019-10-25 RX ADMIN — CASTOR OIL AND BALSAM, PERU: 788; 87 OINTMENT TOPICAL at 21:15

## 2019-10-25 RX ADMIN — SODIUM CHLORIDE 100 ML/HR: 9 INJECTION, SOLUTION INTRAVENOUS at 18:37

## 2019-10-25 RX ADMIN — INSULIN DETEMIR 25 UNITS: 100 INJECTION, SOLUTION SUBCUTANEOUS at 09:09

## 2019-10-25 RX ADMIN — CHOLECALCIFEROL CAP 125 MCG (5000 UNIT) 5000 UNITS: 125 CAP at 21:13

## 2019-10-25 RX ADMIN — GABAPENTIN 800 MG: 400 CAPSULE ORAL at 14:26

## 2019-10-25 RX ADMIN — VANCOMYCIN HYDROCHLORIDE 2000 MG: 5 INJECTION, POWDER, LYOPHILIZED, FOR SOLUTION INTRAVENOUS at 04:17

## 2019-10-25 RX ADMIN — SUCRALFATE 1 G: 1 TABLET ORAL at 06:01

## 2019-10-25 RX ADMIN — INSULIN ASPART 10 UNITS: 100 INJECTION, SOLUTION INTRAVENOUS; SUBCUTANEOUS at 21:14

## 2019-10-25 RX ADMIN — SODIUM CHLORIDE 100 ML/HR: 9 INJECTION, SOLUTION INTRAVENOUS at 09:20

## 2019-10-25 RX ADMIN — DULOXETINE HYDROCHLORIDE 60 MG: 60 CAPSULE, DELAYED RELEASE ORAL at 21:13

## 2019-10-25 RX ADMIN — SUCRALFATE 1 G: 1 TABLET ORAL at 18:30

## 2019-10-25 RX ADMIN — SODIUM CHLORIDE, PRESERVATIVE FREE 10 ML: 5 INJECTION INTRAVENOUS at 09:16

## 2019-10-25 RX ADMIN — GABAPENTIN 800 MG: 400 CAPSULE ORAL at 21:13

## 2019-10-25 RX ADMIN — ATORVASTATIN CALCIUM 10 MG: 10 TABLET, FILM COATED ORAL at 21:13

## 2019-10-25 RX ADMIN — APIXABAN 5 MG: 5 TABLET, FILM COATED ORAL at 09:10

## 2019-10-25 RX ADMIN — BACLOFEN 30 MG: 10 TABLET ORAL at 18:30

## 2019-10-25 RX ADMIN — FAMOTIDINE 20 MG: 20 TABLET, FILM COATED ORAL at 18:30

## 2019-10-25 RX ADMIN — INSULIN ASPART 4 UNITS: 100 INJECTION, SOLUTION INTRAVENOUS; SUBCUTANEOUS at 12:00

## 2019-10-25 RX ADMIN — CEFEPIME 2 G: 2 INJECTION, POWDER, FOR SOLUTION INTRAVENOUS at 09:06

## 2019-10-25 RX ADMIN — SODIUM HYPOCHLORITE: 1.25 SOLUTION TOPICAL at 00:34

## 2019-10-25 RX ADMIN — FAMOTIDINE 20 MG: 20 TABLET, FILM COATED ORAL at 06:01

## 2019-10-25 RX ADMIN — BACLOFEN 30 MG: 10 TABLET ORAL at 09:10

## 2019-10-25 RX ADMIN — INSULIN ASPART 18 UNITS: 100 INJECTION, SOLUTION INTRAVENOUS; SUBCUTANEOUS at 12:01

## 2019-10-25 RX ADMIN — SODIUM HYPOCHLORITE: 1.25 SOLUTION TOPICAL at 14:00

## 2019-10-25 RX ADMIN — SUCRALFATE 1 G: 1 TABLET ORAL at 21:13

## 2019-10-25 RX ADMIN — INSULIN ASPART 10 UNITS: 100 INJECTION, SOLUTION INTRAVENOUS; SUBCUTANEOUS at 18:31

## 2019-10-25 RX ADMIN — Medication 1 CAPSULE: at 09:10

## 2019-10-25 RX ADMIN — INSULIN ASPART 18 UNITS: 100 INJECTION, SOLUTION INTRAVENOUS; SUBCUTANEOUS at 18:30

## 2019-10-25 RX ADMIN — ARIPIPRAZOLE 10 MG: 10 TABLET ORAL at 21:13

## 2019-10-25 RX ADMIN — INSULIN ASPART 5 UNITS: 100 INJECTION, SOLUTION INTRAVENOUS; SUBCUTANEOUS at 09:08

## 2019-10-25 RX ADMIN — MODAFINIL 200 MG: 100 TABLET ORAL at 09:20

## 2019-10-25 RX ADMIN — SUCRALFATE 1 G: 1 TABLET ORAL at 12:00

## 2019-10-25 RX ADMIN — APIXABAN 5 MG: 5 TABLET, FILM COATED ORAL at 21:13

## 2019-10-25 RX ADMIN — INSULIN ASPART 18 UNITS: 100 INJECTION, SOLUTION INTRAVENOUS; SUBCUTANEOUS at 09:13

## 2019-10-25 RX ADMIN — BACLOFEN 30 MG: 10 TABLET ORAL at 21:13

## 2019-10-25 RX ADMIN — GABAPENTIN 800 MG: 400 CAPSULE ORAL at 06:01

## 2019-10-25 RX ADMIN — VANCOMYCIN HYDROCHLORIDE 2000 MG: 5 INJECTION, POWDER, LYOPHILIZED, FOR SOLUTION INTRAVENOUS at 18:30

## 2019-10-25 RX ADMIN — BACLOFEN 30 MG: 10 TABLET ORAL at 12:00

## 2019-10-26 LAB
ANION GAP SERPL CALCULATED.3IONS-SCNC: 12.3 MMOL/L (ref 5–15)
BASOPHILS # BLD AUTO: 0.04 10*3/MM3 (ref 0–0.2)
BASOPHILS NFR BLD AUTO: 0.4 % (ref 0–1.5)
BUN BLD-MCNC: 12 MG/DL (ref 6–20)
BUN/CREAT SERPL: 19.4 (ref 7–25)
CALCIUM SPEC-SCNC: 9.1 MG/DL (ref 8.6–10.5)
CHLORIDE SERPL-SCNC: 101 MMOL/L (ref 98–107)
CO2 SERPL-SCNC: 24.7 MMOL/L (ref 22–29)
CREAT BLD-MCNC: 0.62 MG/DL (ref 0.76–1.27)
CRP SERPL-MCNC: 2.84 MG/DL (ref 0–0.5)
DEPRECATED RDW RBC AUTO: 46.5 FL (ref 37–54)
EOSINOPHIL # BLD AUTO: 0.29 10*3/MM3 (ref 0–0.4)
EOSINOPHIL NFR BLD AUTO: 2.8 % (ref 0.3–6.2)
ERYTHROCYTE [DISTWIDTH] IN BLOOD BY AUTOMATED COUNT: 16.2 % (ref 12.3–15.4)
GFR SERPL CREATININE-BSD FRML MDRD: 142 ML/MIN/1.73
GLUCOSE BLD-MCNC: 162 MG/DL (ref 65–99)
GLUCOSE BLDC GLUCOMTR-MCNC: 204 MG/DL (ref 70–130)
GLUCOSE BLDC GLUCOMTR-MCNC: 231 MG/DL (ref 70–130)
GLUCOSE BLDC GLUCOMTR-MCNC: 259 MG/DL (ref 70–130)
GLUCOSE BLDC GLUCOMTR-MCNC: 260 MG/DL (ref 70–130)
HCT VFR BLD AUTO: 36 % (ref 37.5–51)
HGB BLD-MCNC: 10.6 G/DL (ref 13–17.7)
IMM GRANULOCYTES # BLD AUTO: 0.06 10*3/MM3 (ref 0–0.05)
IMM GRANULOCYTES NFR BLD AUTO: 0.6 % (ref 0–0.5)
LYMPHOCYTES # BLD AUTO: 2.05 10*3/MM3 (ref 0.7–3.1)
LYMPHOCYTES NFR BLD AUTO: 20.1 % (ref 19.6–45.3)
MCH RBC QN AUTO: 23.6 PG (ref 26.6–33)
MCHC RBC AUTO-ENTMCNC: 29.4 G/DL (ref 31.5–35.7)
MCV RBC AUTO: 80 FL (ref 79–97)
MONOCYTES # BLD AUTO: 0.38 10*3/MM3 (ref 0.1–0.9)
MONOCYTES NFR BLD AUTO: 3.7 % (ref 5–12)
NEUTROPHILS # BLD AUTO: 7.36 10*3/MM3 (ref 1.7–7)
NEUTROPHILS NFR BLD AUTO: 72.4 % (ref 42.7–76)
NRBC BLD AUTO-RTO: 0 /100 WBC (ref 0–0.2)
PLATELET # BLD AUTO: 318 10*3/MM3 (ref 140–450)
PMV BLD AUTO: 10.4 FL (ref 6–12)
POTASSIUM BLD-SCNC: 3.7 MMOL/L (ref 3.5–5.2)
RBC # BLD AUTO: 4.5 10*6/MM3 (ref 4.14–5.8)
SODIUM BLD-SCNC: 138 MMOL/L (ref 136–145)
WBC NRBC COR # BLD: 10.18 10*3/MM3 (ref 3.4–10.8)

## 2019-10-26 PROCEDURE — 25010000002 CEFEPIME 2 G/NS 100 ML SOLUTION: Performed by: NURSE PRACTITIONER

## 2019-10-26 PROCEDURE — 87205 SMEAR GRAM STAIN: CPT | Performed by: INTERNAL MEDICINE

## 2019-10-26 PROCEDURE — 25010000002 VANCOMYCIN 5 G RECONSTITUTED SOLUTION 5,000 MG VIAL: Performed by: NURSE PRACTITIONER

## 2019-10-26 PROCEDURE — 99232 SBSQ HOSP IP/OBS MODERATE 35: CPT | Performed by: INTERNAL MEDICINE

## 2019-10-26 PROCEDURE — 94799 UNLISTED PULMONARY SVC/PX: CPT

## 2019-10-26 PROCEDURE — 87070 CULTURE OTHR SPECIMN AEROBIC: CPT | Performed by: INTERNAL MEDICINE

## 2019-10-26 PROCEDURE — 63710000001 INSULIN ASPART PER 5 UNITS: Performed by: HOSPITALIST

## 2019-10-26 PROCEDURE — 82962 GLUCOSE BLOOD TEST: CPT

## 2019-10-26 PROCEDURE — 63710000001 INSULIN DETEMIR PER 5 UNITS: Performed by: INTERNAL MEDICINE

## 2019-10-26 PROCEDURE — 63710000001 INSULIN ASPART PER 5 UNITS: Performed by: INTERNAL MEDICINE

## 2019-10-26 PROCEDURE — 85025 COMPLETE CBC W/AUTO DIFF WBC: CPT | Performed by: INTERNAL MEDICINE

## 2019-10-26 PROCEDURE — 86140 C-REACTIVE PROTEIN: CPT | Performed by: NURSE PRACTITIONER

## 2019-10-26 PROCEDURE — 80048 BASIC METABOLIC PNL TOTAL CA: CPT | Performed by: INTERNAL MEDICINE

## 2019-10-26 RX ADMIN — INSULIN ASPART 18 UNITS: 100 INJECTION, SOLUTION INTRAVENOUS; SUBCUTANEOUS at 09:34

## 2019-10-26 RX ADMIN — BACLOFEN 30 MG: 10 TABLET ORAL at 17:34

## 2019-10-26 RX ADMIN — VANCOMYCIN HYDROCHLORIDE 2000 MG: 5 INJECTION, POWDER, LYOPHILIZED, FOR SOLUTION INTRAVENOUS at 03:21

## 2019-10-26 RX ADMIN — INSULIN ASPART 5 UNITS: 100 INJECTION, SOLUTION INTRAVENOUS; SUBCUTANEOUS at 12:45

## 2019-10-26 RX ADMIN — MODAFINIL 200 MG: 100 TABLET ORAL at 09:39

## 2019-10-26 RX ADMIN — BACLOFEN 30 MG: 10 TABLET ORAL at 19:58

## 2019-10-26 RX ADMIN — FAMOTIDINE 20 MG: 20 TABLET, FILM COATED ORAL at 17:34

## 2019-10-26 RX ADMIN — GABAPENTIN 800 MG: 400 CAPSULE ORAL at 13:34

## 2019-10-26 RX ADMIN — BACLOFEN 30 MG: 10 TABLET ORAL at 09:39

## 2019-10-26 RX ADMIN — GABAPENTIN 800 MG: 400 CAPSULE ORAL at 19:57

## 2019-10-26 RX ADMIN — SUCRALFATE 1 G: 1 TABLET ORAL at 09:39

## 2019-10-26 RX ADMIN — CEFEPIME 2 G: 2 INJECTION, POWDER, FOR SOLUTION INTRAVENOUS at 09:40

## 2019-10-26 RX ADMIN — INSULIN ASPART 18 UNITS: 100 INJECTION, SOLUTION INTRAVENOUS; SUBCUTANEOUS at 17:33

## 2019-10-26 RX ADMIN — SUCRALFATE 1 G: 1 TABLET ORAL at 19:58

## 2019-10-26 RX ADMIN — SODIUM CHLORIDE 100 ML/HR: 9 INJECTION, SOLUTION INTRAVENOUS at 16:09

## 2019-10-26 RX ADMIN — APIXABAN 5 MG: 5 TABLET, FILM COATED ORAL at 19:58

## 2019-10-26 RX ADMIN — CASTOR OIL AND BALSAM, PERU: 788; 87 OINTMENT TOPICAL at 20:36

## 2019-10-26 RX ADMIN — VANCOMYCIN HYDROCHLORIDE 2000 MG: 5 INJECTION, POWDER, LYOPHILIZED, FOR SOLUTION INTRAVENOUS at 16:09

## 2019-10-26 RX ADMIN — FAMOTIDINE 20 MG: 20 TABLET, FILM COATED ORAL at 09:39

## 2019-10-26 RX ADMIN — INSULIN DETEMIR 30 UNITS: 100 INJECTION, SOLUTION SUBCUTANEOUS at 20:00

## 2019-10-26 RX ADMIN — INSULIN ASPART 18 UNITS: 100 INJECTION, SOLUTION INTRAVENOUS; SUBCUTANEOUS at 13:24

## 2019-10-26 RX ADMIN — SODIUM CHLORIDE, PRESERVATIVE FREE 10 ML: 5 INJECTION INTRAVENOUS at 09:41

## 2019-10-26 RX ADMIN — ATORVASTATIN CALCIUM 10 MG: 10 TABLET, FILM COATED ORAL at 19:58

## 2019-10-26 RX ADMIN — INSULIN ASPART 8 UNITS: 100 INJECTION, SOLUTION INTRAVENOUS; SUBCUTANEOUS at 17:34

## 2019-10-26 RX ADMIN — DULOXETINE HYDROCHLORIDE 60 MG: 60 CAPSULE, DELAYED RELEASE ORAL at 09:39

## 2019-10-26 RX ADMIN — GABAPENTIN 800 MG: 400 CAPSULE ORAL at 05:55

## 2019-10-26 RX ADMIN — CEFEPIME 2 G: 2 INJECTION, POWDER, FOR SOLUTION INTRAVENOUS at 00:53

## 2019-10-26 RX ADMIN — BACLOFEN 30 MG: 10 TABLET ORAL at 13:34

## 2019-10-26 RX ADMIN — ARIPIPRAZOLE 10 MG: 10 TABLET ORAL at 19:58

## 2019-10-26 RX ADMIN — SODIUM CHLORIDE 100 ML/HR: 9 INJECTION, SOLUTION INTRAVENOUS at 03:20

## 2019-10-26 RX ADMIN — CEFEPIME 2 G: 2 INJECTION, POWDER, FOR SOLUTION INTRAVENOUS at 15:03

## 2019-10-26 RX ADMIN — SUCRALFATE 1 G: 1 TABLET ORAL at 13:24

## 2019-10-26 RX ADMIN — INSULIN DETEMIR 30 UNITS: 100 INJECTION, SOLUTION SUBCUTANEOUS at 09:35

## 2019-10-26 RX ADMIN — CASTOR OIL AND BALSAM, PERU: 788; 87 OINTMENT TOPICAL at 09:40

## 2019-10-26 RX ADMIN — CHOLECALCIFEROL CAP 125 MCG (5000 UNIT) 5000 UNITS: 125 CAP at 19:58

## 2019-10-26 RX ADMIN — SODIUM HYPOCHLORITE: 1.25 SOLUTION TOPICAL at 03:18

## 2019-10-26 RX ADMIN — CEFEPIME 2 G: 2 INJECTION, POWDER, FOR SOLUTION INTRAVENOUS at 23:13

## 2019-10-26 RX ADMIN — SODIUM HYPOCHLORITE: 1.25 SOLUTION TOPICAL at 15:04

## 2019-10-26 RX ADMIN — DULOXETINE HYDROCHLORIDE 60 MG: 60 CAPSULE, DELAYED RELEASE ORAL at 19:58

## 2019-10-26 RX ADMIN — SUCRALFATE 1 G: 1 TABLET ORAL at 17:34

## 2019-10-26 RX ADMIN — APIXABAN 5 MG: 5 TABLET, FILM COATED ORAL at 09:39

## 2019-10-26 RX ADMIN — Medication 1 CAPSULE: at 09:39

## 2019-10-26 RX ADMIN — INSULIN ASPART 5 UNITS: 100 INJECTION, SOLUTION INTRAVENOUS; SUBCUTANEOUS at 09:38

## 2019-10-26 RX ADMIN — INSULIN ASPART 8 UNITS: 100 INJECTION, SOLUTION INTRAVENOUS; SUBCUTANEOUS at 19:59

## 2019-10-27 VITALS
HEART RATE: 76 BPM | RESPIRATION RATE: 20 BRPM | OXYGEN SATURATION: 96 % | TEMPERATURE: 97.5 F | BODY MASS INDEX: 44.1 KG/M2 | SYSTOLIC BLOOD PRESSURE: 140 MMHG | HEIGHT: 71 IN | DIASTOLIC BLOOD PRESSURE: 78 MMHG | WEIGHT: 315 LBS

## 2019-10-27 LAB
ANION GAP SERPL CALCULATED.3IONS-SCNC: 13 MMOL/L (ref 5–15)
BASOPHILS # BLD AUTO: 0.04 10*3/MM3 (ref 0–0.2)
BASOPHILS NFR BLD AUTO: 0.4 % (ref 0–1.5)
BUN BLD-MCNC: 17 MG/DL (ref 6–20)
BUN/CREAT SERPL: 24.3 (ref 7–25)
CALCIUM SPEC-SCNC: 9.4 MG/DL (ref 8.6–10.5)
CHLORIDE SERPL-SCNC: 102 MMOL/L (ref 98–107)
CO2 SERPL-SCNC: 22 MMOL/L (ref 22–29)
CREAT BLD-MCNC: 0.7 MG/DL (ref 0.76–1.27)
CRP SERPL-MCNC: 3.39 MG/DL (ref 0–0.5)
DEPRECATED RDW RBC AUTO: 48.4 FL (ref 37–54)
EOSINOPHIL # BLD AUTO: 0.32 10*3/MM3 (ref 0–0.4)
EOSINOPHIL NFR BLD AUTO: 3.3 % (ref 0.3–6.2)
ERYTHROCYTE [DISTWIDTH] IN BLOOD BY AUTOMATED COUNT: 16.5 % (ref 12.3–15.4)
GFR SERPL CREATININE-BSD FRML MDRD: 124 ML/MIN/1.73
GLUCOSE BLD-MCNC: 331 MG/DL (ref 65–99)
GLUCOSE BLDC GLUCOMTR-MCNC: 247 MG/DL (ref 70–130)
GLUCOSE BLDC GLUCOMTR-MCNC: 250 MG/DL (ref 70–130)
GLUCOSE BLDC GLUCOMTR-MCNC: 263 MG/DL (ref 70–130)
GLUCOSE BLDC GLUCOMTR-MCNC: 320 MG/DL (ref 70–130)
GLUCOSE BLDC GLUCOMTR-MCNC: 360 MG/DL (ref 70–130)
HCT VFR BLD AUTO: 37.3 % (ref 37.5–51)
HGB BLD-MCNC: 10.9 G/DL (ref 13–17.7)
IMM GRANULOCYTES # BLD AUTO: 0.06 10*3/MM3 (ref 0–0.05)
IMM GRANULOCYTES NFR BLD AUTO: 0.6 % (ref 0–0.5)
LYMPHOCYTES # BLD AUTO: 2.21 10*3/MM3 (ref 0.7–3.1)
LYMPHOCYTES NFR BLD AUTO: 22.9 % (ref 19.6–45.3)
MCH RBC QN AUTO: 23.7 PG (ref 26.6–33)
MCHC RBC AUTO-ENTMCNC: 29.2 G/DL (ref 31.5–35.7)
MCV RBC AUTO: 81.3 FL (ref 79–97)
MONOCYTES # BLD AUTO: 0.39 10*3/MM3 (ref 0.1–0.9)
MONOCYTES NFR BLD AUTO: 4 % (ref 5–12)
NEUTROPHILS # BLD AUTO: 6.64 10*3/MM3 (ref 1.7–7)
NEUTROPHILS NFR BLD AUTO: 68.8 % (ref 42.7–76)
NRBC BLD AUTO-RTO: 0 /100 WBC (ref 0–0.2)
PLATELET # BLD AUTO: 409 10*3/MM3 (ref 140–450)
PMV BLD AUTO: 9.9 FL (ref 6–12)
POTASSIUM BLD-SCNC: 4.1 MMOL/L (ref 3.5–5.2)
RBC # BLD AUTO: 4.59 10*6/MM3 (ref 4.14–5.8)
SODIUM BLD-SCNC: 137 MMOL/L (ref 136–145)
WBC NRBC COR # BLD: 9.66 10*3/MM3 (ref 3.4–10.8)

## 2019-10-27 PROCEDURE — 99239 HOSP IP/OBS DSCHRG MGMT >30: CPT | Performed by: INTERNAL MEDICINE

## 2019-10-27 PROCEDURE — 86140 C-REACTIVE PROTEIN: CPT | Performed by: NURSE PRACTITIONER

## 2019-10-27 PROCEDURE — 63710000001 INSULIN ASPART PER 5 UNITS: Performed by: HOSPITALIST

## 2019-10-27 PROCEDURE — 87070 CULTURE OTHR SPECIMN AEROBIC: CPT | Performed by: INTERNAL MEDICINE

## 2019-10-27 PROCEDURE — 25010000002 VANCOMYCIN 5 G RECONSTITUTED SOLUTION 5,000 MG VIAL: Performed by: NURSE PRACTITIONER

## 2019-10-27 PROCEDURE — 25010000002 CEFEPIME 2 G/NS 100 ML SOLUTION: Performed by: NURSE PRACTITIONER

## 2019-10-27 PROCEDURE — 63710000001 INSULIN ASPART PER 5 UNITS: Performed by: INTERNAL MEDICINE

## 2019-10-27 PROCEDURE — 99232 SBSQ HOSP IP/OBS MODERATE 35: CPT | Performed by: INTERNAL MEDICINE

## 2019-10-27 PROCEDURE — 82962 GLUCOSE BLOOD TEST: CPT

## 2019-10-27 PROCEDURE — 85025 COMPLETE CBC W/AUTO DIFF WBC: CPT | Performed by: INTERNAL MEDICINE

## 2019-10-27 PROCEDURE — 80048 BASIC METABOLIC PNL TOTAL CA: CPT | Performed by: INTERNAL MEDICINE

## 2019-10-27 PROCEDURE — 87205 SMEAR GRAM STAIN: CPT | Performed by: INTERNAL MEDICINE

## 2019-10-27 PROCEDURE — 63710000001 INSULIN DETEMIR PER 5 UNITS: Performed by: INTERNAL MEDICINE

## 2019-10-27 RX ORDER — CASTOR OIL AND BALSAM, PERU 788; 87 MG/G; MG/G
1 OINTMENT TOPICAL EVERY 12 HOURS SCHEDULED
Qty: 10 G | Refills: 0 | Status: SHIPPED | OUTPATIENT
Start: 2019-10-27 | End: 2021-02-07

## 2019-10-27 RX ADMIN — VANCOMYCIN HYDROCHLORIDE 2000 MG: 5 INJECTION, POWDER, LYOPHILIZED, FOR SOLUTION INTRAVENOUS at 05:03

## 2019-10-27 RX ADMIN — SODIUM HYPOCHLORITE: 1.25 SOLUTION TOPICAL at 05:04

## 2019-10-27 RX ADMIN — SUCRALFATE 1 G: 1 TABLET ORAL at 11:56

## 2019-10-27 RX ADMIN — GABAPENTIN 800 MG: 400 CAPSULE ORAL at 05:03

## 2019-10-27 RX ADMIN — FAMOTIDINE 20 MG: 20 TABLET, FILM COATED ORAL at 16:36

## 2019-10-27 RX ADMIN — BACLOFEN 30 MG: 10 TABLET ORAL at 11:57

## 2019-10-27 RX ADMIN — SODIUM CHLORIDE, PRESERVATIVE FREE 10 ML: 5 INJECTION INTRAVENOUS at 09:00

## 2019-10-27 RX ADMIN — INSULIN ASPART 8 UNITS: 100 INJECTION, SOLUTION INTRAVENOUS; SUBCUTANEOUS at 11:54

## 2019-10-27 RX ADMIN — SODIUM CHLORIDE 100 ML/HR: 9 INJECTION, SOLUTION INTRAVENOUS at 02:30

## 2019-10-27 RX ADMIN — CEFEPIME 2 G: 2 INJECTION, POWDER, FOR SOLUTION INTRAVENOUS at 16:00

## 2019-10-27 RX ADMIN — SODIUM HYPOCHLORITE: 1.25 SOLUTION TOPICAL at 16:42

## 2019-10-27 RX ADMIN — DULOXETINE HYDROCHLORIDE 60 MG: 60 CAPSULE, DELAYED RELEASE ORAL at 08:59

## 2019-10-27 RX ADMIN — GABAPENTIN 800 MG: 400 CAPSULE ORAL at 16:37

## 2019-10-27 RX ADMIN — Medication 1 CAPSULE: at 09:00

## 2019-10-27 RX ADMIN — APIXABAN 5 MG: 5 TABLET, FILM COATED ORAL at 08:59

## 2019-10-27 RX ADMIN — BACLOFEN 30 MG: 10 TABLET ORAL at 16:36

## 2019-10-27 RX ADMIN — VANCOMYCIN HYDROCHLORIDE 2000 MG: 5 INJECTION, POWDER, LYOPHILIZED, FOR SOLUTION INTRAVENOUS at 16:37

## 2019-10-27 RX ADMIN — INSULIN DETEMIR 30 UNITS: 100 INJECTION, SOLUTION SUBCUTANEOUS at 08:57

## 2019-10-27 RX ADMIN — INSULIN ASPART 18 UNITS: 100 INJECTION, SOLUTION INTRAVENOUS; SUBCUTANEOUS at 11:53

## 2019-10-27 RX ADMIN — INSULIN ASPART 18 UNITS: 100 INJECTION, SOLUTION INTRAVENOUS; SUBCUTANEOUS at 16:45

## 2019-10-27 RX ADMIN — FAMOTIDINE 20 MG: 20 TABLET, FILM COATED ORAL at 05:04

## 2019-10-27 RX ADMIN — MODAFINIL 200 MG: 100 TABLET ORAL at 08:59

## 2019-10-27 RX ADMIN — CEFEPIME 2 G: 2 INJECTION, POWDER, FOR SOLUTION INTRAVENOUS at 08:57

## 2019-10-27 RX ADMIN — BACLOFEN 30 MG: 10 TABLET ORAL at 08:59

## 2019-10-27 RX ADMIN — SUCRALFATE 1 G: 1 TABLET ORAL at 05:04

## 2019-10-27 RX ADMIN — CASTOR OIL AND BALSAM, PERU: 788; 87 OINTMENT TOPICAL at 09:01

## 2019-10-27 RX ADMIN — INSULIN ASPART 6 UNITS: 100 INJECTION, SOLUTION INTRAVENOUS; SUBCUTANEOUS at 18:05

## 2019-10-27 RX ADMIN — SUCRALFATE 1 G: 1 TABLET ORAL at 16:36

## 2019-10-27 RX ADMIN — INSULIN ASPART 10 UNITS: 100 INJECTION, SOLUTION INTRAVENOUS; SUBCUTANEOUS at 07:59

## 2019-10-27 RX ADMIN — INSULIN ASPART 18 UNITS: 100 INJECTION, SOLUTION INTRAVENOUS; SUBCUTANEOUS at 08:58

## 2019-10-28 ENCOUNTER — READMISSION MANAGEMENT (OUTPATIENT)
Dept: CALL CENTER | Facility: HOSPITAL | Age: 44
End: 2019-10-28

## 2019-10-28 ENCOUNTER — TRANSITIONAL CARE MANAGEMENT TELEPHONE ENCOUNTER (OUTPATIENT)
Dept: FAMILY MEDICINE CLINIC | Facility: CLINIC | Age: 44
End: 2019-10-28

## 2019-10-28 LAB
BACTERIA SPEC RESP CULT: NORMAL
GRAM STN SPEC: NORMAL

## 2019-10-28 NOTE — OUTREACH NOTE
Prep Survey      Responses   Facility patient discharged from?  [facility]   Is patient eligible?  Yes   Discharge diagnosis  Paraplegia, HTN, HLD, IDDM II, skin cancer right arm, ARLIN on CPAP, s/p Left AKA, chronic bilateral buttock wounds   Does the patient have one of the following disease processes/diagnoses(primary or secondary)?  Sepsis   Does the patient have Home health ordered?  Yes   What is the Home health agency?   [facility] for IV antibiotics, [facility]   Is there a DME ordered?  No   What DME was ordered?  Pt has hospital bed, WC and CPAP at home   Comments regarding appointments  Pt will be notified of appointments   Prep survey completed?  Yes          Lin Call RN

## 2019-10-29 ENCOUNTER — TRANSITIONAL CARE MANAGEMENT TELEPHONE ENCOUNTER (OUTPATIENT)
Dept: FAMILY MEDICINE CLINIC | Facility: CLINIC | Age: 44
End: 2019-10-29

## 2019-10-29 LAB
BACTERIA SPEC RESP CULT: NORMAL
GRAM STN SPEC: NORMAL

## 2019-10-31 ENCOUNTER — READMISSION MANAGEMENT (OUTPATIENT)
Dept: CALL CENTER | Facility: HOSPITAL | Age: 44
End: 2019-10-31

## 2019-10-31 NOTE — OUTREACH NOTE
Sepsis Week 1 Survey      Responses   Facility patient discharged from?  [facility]   Does the patient have one of the following disease processes/diagnoses(primary or secondary)?  Sepsis   Is there a successful TCM telephone encounter documented?  No   Week 1 attempt successful?  Yes   Call start time  1140   Revoke  Decline to participate [says has nurses coming will call us if needed]   Call end time  1141          Lizeth Quiroz, RN

## 2019-11-15 ENCOUNTER — APPOINTMENT (OUTPATIENT)
Dept: LAB | Facility: HOSPITAL | Age: 44
End: 2019-11-15

## 2019-11-15 ENCOUNTER — TRANSCRIBE ORDERS (OUTPATIENT)
Dept: ADMINISTRATIVE | Facility: HOSPITAL | Age: 44
End: 2019-11-15

## 2019-11-15 DIAGNOSIS — K21.9 CHRONIC GASTROESOPHAGEAL REFLUX DISEASE: Primary | ICD-10-CM

## 2019-11-15 DIAGNOSIS — M54.50 LOW BACK PAIN, UNSPECIFIED BACK PAIN LATERALITY, UNSPECIFIED CHRONICITY, UNSPECIFIED WHETHER SCIATICA PRESENT: ICD-10-CM

## 2019-11-15 LAB — VANCOMYCIN SERPL-MCNC: 27 MCG/ML (ref 5–40)

## 2019-11-15 PROCEDURE — 36415 COLL VENOUS BLD VENIPUNCTURE: CPT | Performed by: NURSE PRACTITIONER

## 2019-11-15 PROCEDURE — 80202 ASSAY OF VANCOMYCIN: CPT | Performed by: NURSE PRACTITIONER

## 2020-01-06 ENCOUNTER — HOSPITAL ENCOUNTER (OUTPATIENT)
Dept: WOUND CARE | Facility: HOSPITAL | Age: 45
Discharge: HOME | End: 2020-01-06
Admitting: NURSE PRACTITIONER

## 2020-01-06 VITALS
OXYGEN SATURATION: 97 % | DIASTOLIC BLOOD PRESSURE: 62 MMHG | WEIGHT: 315 LBS | HEART RATE: 90 BPM | SYSTOLIC BLOOD PRESSURE: 106 MMHG | BODY MASS INDEX: 44.1 KG/M2 | TEMPERATURE: 97.8 F | RESPIRATION RATE: 16 BRPM | HEIGHT: 71 IN

## 2020-01-06 DIAGNOSIS — L89.304 PRESSURE INJURY OF BUTTOCK, STAGE 4, UNSPECIFIED LATERALITY (HCC): Primary | ICD-10-CM

## 2020-01-06 PROCEDURE — 97602 WOUND(S) CARE NON-SELECTIVE: CPT
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
## 2020-01-20 ENCOUNTER — APPOINTMENT (OUTPATIENT)
Dept: WOUND CARE | Facility: HOSPITAL | Age: 45
End: 2020-01-20

## 2020-01-27 ENCOUNTER — HOSPITAL ENCOUNTER (OUTPATIENT)
Dept: WOUND CARE | Facility: HOSPITAL | Age: 45
Discharge: HOME | End: 2020-01-27

## 2020-01-27 VITALS
SYSTOLIC BLOOD PRESSURE: 155 MMHG | DIASTOLIC BLOOD PRESSURE: 65 MMHG | OXYGEN SATURATION: 96 % | HEIGHT: 71 IN | WEIGHT: 315 LBS | RESPIRATION RATE: 16 BRPM | BODY MASS INDEX: 44.1 KG/M2 | HEART RATE: 85 BPM | TEMPERATURE: 98.1 F
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
## 2020-01-31 ENCOUNTER — TELEPHONE (OUTPATIENT)
Dept: WOUND CARE | Facility: HOSPITAL | Age: 45
End: 2020-01-31

## 2020-02-03 ENCOUNTER — HOSPITAL ENCOUNTER (OUTPATIENT)
Dept: WOUND CARE | Facility: HOSPITAL | Age: 45
Discharge: HOME | End: 2020-02-03
Admitting: NURSE PRACTITIONER

## 2020-02-03 VITALS
DIASTOLIC BLOOD PRESSURE: 65 MMHG | SYSTOLIC BLOOD PRESSURE: 128 MMHG | RESPIRATION RATE: 17 BRPM | HEART RATE: 84 BPM | HEIGHT: 71 IN | WEIGHT: 315 LBS | BODY MASS INDEX: 44.1 KG/M2 | TEMPERATURE: 97.7 F

## 2020-02-03 DIAGNOSIS — L89.304 PRESSURE INJURY OF BUTTOCK, STAGE 4, UNSPECIFIED LATERALITY (HCC): Primary | ICD-10-CM

## 2020-02-03 PROCEDURE — 97602 WOUND(S) CARE NON-SELECTIVE: CPT
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
## 2020-02-17 ENCOUNTER — HOSPITAL ENCOUNTER (OUTPATIENT)
Dept: WOUND CARE | Facility: HOSPITAL | Age: 45
Discharge: HOME | End: 2020-02-17
Admitting: NURSE PRACTITIONER

## 2020-02-17 VITALS
TEMPERATURE: 97.8 F | HEART RATE: 70 BPM | DIASTOLIC BLOOD PRESSURE: 62 MMHG | WEIGHT: 315 LBS | BODY MASS INDEX: 44.1 KG/M2 | SYSTOLIC BLOOD PRESSURE: 176 MMHG | RESPIRATION RATE: 18 BRPM | HEIGHT: 71 IN

## 2020-02-17 DIAGNOSIS — L89.319 PRESSURE INJURY OF SKIN OF RIGHT BUTTOCK, UNSPECIFIED INJURY STAGE: ICD-10-CM

## 2020-02-17 PROCEDURE — 63710000001 MAGIC BARRIER CREAM 60 G BOTTLE: Performed by: NURSE PRACTITIONER

## 2020-02-17 PROCEDURE — A9270 NON-COVERED ITEM OR SERVICE: HCPCS | Performed by: NURSE PRACTITIONER

## 2020-02-17 PROCEDURE — 63710000001 CASTOR OIL-BALSAM PERU OINTMENT 60 G TUBE: Performed by: NURSE PRACTITIONER

## 2020-02-17 PROCEDURE — G0463 HOSPITAL OUTPT CLINIC VISIT: HCPCS

## 2020-02-17 PROCEDURE — 97602 WOUND(S) CARE NON-SELECTIVE: CPT

## 2020-02-17 RX ORDER — CASTOR OIL AND BALSAM, PERU 788; 87 MG/G; MG/G
OINTMENT TOPICAL ONCE
Status: DISCONTINUED | OUTPATIENT
Start: 2020-02-17 | End: 2020-02-18 | Stop reason: HOSPADM

## 2020-02-17 NOTE — ADDENDUM NOTE
Encounter addended by: Felicia Goodman RN on: 2/17/2020 4:27 PM   Actions taken: Charge Capture section accepted, Order Reconciliation Section accessed, Flowsheet accepted
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
## 2020-03-02 ENCOUNTER — HOSPITAL ENCOUNTER (OUTPATIENT)
Dept: WOUND CARE | Facility: HOSPITAL | Age: 45
Discharge: HOME | End: 2020-03-02
Admitting: NURSE PRACTITIONER

## 2020-03-02 VITALS
HEART RATE: 80 BPM | TEMPERATURE: 98.1 F | DIASTOLIC BLOOD PRESSURE: 73 MMHG | RESPIRATION RATE: 20 BRPM | SYSTOLIC BLOOD PRESSURE: 137 MMHG

## 2020-03-02 DIAGNOSIS — L89.319 PRESSURE INJURY OF SKIN OF RIGHT BUTTOCK, UNSPECIFIED INJURY STAGE: ICD-10-CM

## 2020-03-02 PROCEDURE — 97602 WOUND(S) CARE NON-SELECTIVE: CPT

## 2020-03-02 PROCEDURE — 63710000001 CASTOR OIL-BALSAM PERU OINTMENT 60 G TUBE: Performed by: NURSE PRACTITIONER

## 2020-03-02 PROCEDURE — A9270 NON-COVERED ITEM OR SERVICE: HCPCS | Performed by: NURSE PRACTITIONER

## 2020-03-02 RX ORDER — CASTOR OIL AND BALSAM, PERU 788; 87 MG/G; MG/G
OINTMENT TOPICAL ONCE
Status: COMPLETED | OUTPATIENT
Start: 2020-03-02 | End: 2020-03-02

## 2020-03-02 RX ADMIN — CASTOR OIL AND BALSAM, PERU: 788; 87 OINTMENT TOPICAL at 14:15
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
## 2020-03-03 NOTE — ADDENDUM NOTE
Encounter addended by: Roshni Armstrong RN on: 3/2/2020 8:55 PM   Actions taken: Flowsheet accepted, Visit diagnoses modified, Charge Capture section accepted, MAR administration accepted CONSTITUTIONAL: Well-appearing; well-nourished; in no apparent distress.   EYES: PERRL; EOM intact.   CARDIOVASCULAR: Normal S1, S2; no murmurs, rubs, or gallops.   RESPIRATORY: Normal chest excursion with respiration; breath sounds clear and equal bilaterally; no wheezes, rhonchi, or rales.  GI/: Normal bowel sounds; non-distended; non-tender; no palpable organomegaly.   MS: No midline tenderness to neck/back. all extremities with FROM without pain. ambulate with steady gait.   SKIN: small superficial abrasion to right wrist. multiple erythema linear marks to upper and lower back with mild swelling and tenderness. no bleeding and drainage. +ecchymosis to left upper   NEURO/PSYCH: A & O x 4; grossly unremarkable.

## 2020-03-16 ENCOUNTER — TRANSCRIBE ORDERS (OUTPATIENT)
Dept: INFUSION THERAPY | Facility: HOSPITAL | Age: 45
End: 2020-03-16

## 2020-03-16 ENCOUNTER — HOSPITAL ENCOUNTER (OUTPATIENT)
Dept: WOUND CARE | Facility: HOSPITAL | Age: 45
Discharge: HOME | End: 2020-03-16
Admitting: NURSE PRACTITIONER

## 2020-03-16 VITALS
RESPIRATION RATE: 16 BRPM | BODY MASS INDEX: 44.1 KG/M2 | DIASTOLIC BLOOD PRESSURE: 65 MMHG | TEMPERATURE: 97.9 F | HEIGHT: 71 IN | SYSTOLIC BLOOD PRESSURE: 142 MMHG | WEIGHT: 315 LBS | HEART RATE: 74 BPM

## 2020-03-16 DIAGNOSIS — L08.9 INFECTED DECUBITUS ULCER, UNSPECIFIED ULCER STAGE: ICD-10-CM

## 2020-03-16 DIAGNOSIS — L89.90 INFECTED DECUBITUS ULCER, UNSPECIFIED ULCER STAGE: ICD-10-CM

## 2020-03-16 PROCEDURE — A9270 NON-COVERED ITEM OR SERVICE: HCPCS | Performed by: NURSE PRACTITIONER

## 2020-03-16 PROCEDURE — 97602 WOUND(S) CARE NON-SELECTIVE: CPT

## 2020-03-16 PROCEDURE — 63710000001 CASTOR OIL-BALSAM PERU OINTMENT 60 G TUBE: Performed by: NURSE PRACTITIONER

## 2020-03-16 RX ORDER — CASTOR OIL AND BALSAM, PERU 788; 87 MG/G; MG/G
OINTMENT TOPICAL ONCE
Status: COMPLETED | OUTPATIENT
Start: 2020-03-16 | End: 2020-03-16

## 2020-03-16 RX ADMIN — CASTOR OIL AND BALSAM, PERU: 788; 87 OINTMENT TOPICAL at 13:30
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
## 2020-03-30 ENCOUNTER — HOSPITAL ENCOUNTER (OUTPATIENT)
Dept: WOUND CARE | Facility: HOSPITAL | Age: 45
Discharge: HOME | End: 2020-03-30
Admitting: NURSE PRACTITIONER

## 2020-03-30 VITALS
HEIGHT: 71 IN | RESPIRATION RATE: 17 BRPM | HEART RATE: 71 BPM | SYSTOLIC BLOOD PRESSURE: 159 MMHG | BODY MASS INDEX: 44.1 KG/M2 | DIASTOLIC BLOOD PRESSURE: 79 MMHG | WEIGHT: 315 LBS | TEMPERATURE: 98.5 F

## 2020-03-30 DIAGNOSIS — L89.319 PRESSURE INJURY OF SKIN OF RIGHT BUTTOCK, UNSPECIFIED INJURY STAGE: Primary | ICD-10-CM

## 2020-03-30 PROCEDURE — 97602 WOUND(S) CARE NON-SELECTIVE: CPT

## 2020-03-30 PROCEDURE — 63710000001 MAGIC BARRIER CREAM 60 G BOTTLE: Performed by: NURSE PRACTITIONER

## 2020-03-30 PROCEDURE — A9270 NON-COVERED ITEM OR SERVICE: HCPCS | Performed by: NURSE PRACTITIONER

## 2020-03-30 RX ADMIN — Medication: at 15:30
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
## 2020-04-13 ENCOUNTER — HOSPITAL ENCOUNTER (OUTPATIENT)
Dept: WOUND CARE | Facility: HOSPITAL | Age: 45
Discharge: HOME | End: 2020-04-13
Admitting: NURSE PRACTITIONER

## 2020-04-13 VITALS
HEART RATE: 78 BPM | DIASTOLIC BLOOD PRESSURE: 64 MMHG | SYSTOLIC BLOOD PRESSURE: 122 MMHG | TEMPERATURE: 98.2 F | RESPIRATION RATE: 16 BRPM

## 2020-04-13 DIAGNOSIS — L89.319 PRESSURE INJURY OF SKIN OF RIGHT BUTTOCK, UNSPECIFIED INJURY STAGE: ICD-10-CM

## 2020-04-13 PROCEDURE — 97602 WOUND(S) CARE NON-SELECTIVE: CPT

## 2020-04-13 PROCEDURE — A9270 NON-COVERED ITEM OR SERVICE: HCPCS | Performed by: NURSE PRACTITIONER

## 2020-04-13 PROCEDURE — 63710000001 MAGIC BARRIER CREAM 60 G BOTTLE: Performed by: NURSE PRACTITIONER
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
## 2020-04-27 ENCOUNTER — APPOINTMENT (OUTPATIENT)
Dept: WOUND CARE | Facility: HOSPITAL | Age: 45
End: 2020-04-27

## 2020-05-04 ENCOUNTER — HOSPITAL ENCOUNTER (OUTPATIENT)
Dept: WOUND CARE | Facility: HOSPITAL | Age: 45
Discharge: HOME | End: 2020-05-04
Admitting: NURSE PRACTITIONER

## 2020-05-04 VITALS
DIASTOLIC BLOOD PRESSURE: 70 MMHG | HEIGHT: 71 IN | HEART RATE: 68 BPM | RESPIRATION RATE: 16 BRPM | WEIGHT: 315 LBS | SYSTOLIC BLOOD PRESSURE: 156 MMHG | TEMPERATURE: 97.9 F | BODY MASS INDEX: 44.1 KG/M2

## 2020-05-04 DIAGNOSIS — L89.310 PRESSURE INJURY OF RIGHT BUTTOCK, UNSTAGEABLE (HCC): ICD-10-CM

## 2020-05-04 PROCEDURE — 63710000001 MAGIC BARRIER CREAM 60 G BOTTLE: Performed by: NURSE PRACTITIONER

## 2020-05-04 PROCEDURE — A9270 NON-COVERED ITEM OR SERVICE: HCPCS | Performed by: NURSE PRACTITIONER

## 2020-05-04 PROCEDURE — 97602 WOUND(S) CARE NON-SELECTIVE: CPT

## 2020-05-04 NOTE — NURSING NOTE
Instructed pt & care giver of importance of adequate protein intake orally, of keeping blood sugars under 150 or below & especially of turning & repositioning in the bed. Instructed pt use pillows or wedges to prop up on sides to relieve pressure on buttocks/coccyx while in electric w/c & while in bed.
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
## 2020-05-18 ENCOUNTER — APPOINTMENT (OUTPATIENT)
Dept: WOUND CARE | Facility: HOSPITAL | Age: 45
End: 2020-05-18

## 2020-05-26 ENCOUNTER — APPOINTMENT (OUTPATIENT)
Dept: WOUND CARE | Facility: HOSPITAL | Age: 45
End: 2020-05-26

## 2020-06-02 ENCOUNTER — HOSPITAL ENCOUNTER (OUTPATIENT)
Dept: WOUND CARE | Facility: HOSPITAL | Age: 45
Discharge: HOME | End: 2020-06-02
Admitting: NURSE PRACTITIONER

## 2020-06-02 VITALS
RESPIRATION RATE: 17 BRPM | BODY MASS INDEX: 44.1 KG/M2 | WEIGHT: 315 LBS | HEART RATE: 70 BPM | TEMPERATURE: 98.9 F | SYSTOLIC BLOOD PRESSURE: 134 MMHG | HEIGHT: 71 IN | DIASTOLIC BLOOD PRESSURE: 72 MMHG

## 2020-06-02 DIAGNOSIS — L89.002 PRESSURE INJURY OF ELBOW, STAGE 2, UNSPECIFIED LATERALITY (HCC): ICD-10-CM

## 2020-06-02 PROCEDURE — 63710000001 MAGIC BARRIER CREAM 60 G BOTTLE: Performed by: NURSE PRACTITIONER

## 2020-06-02 PROCEDURE — 97602 WOUND(S) CARE NON-SELECTIVE: CPT

## 2020-06-02 PROCEDURE — A9270 NON-COVERED ITEM OR SERVICE: HCPCS | Performed by: NURSE PRACTITIONER

## 2020-06-02 RX ADMIN — Medication: at 13:50

## 2020-06-02 NOTE — SIGNIFICANT NOTE
6/2/20 1342 Eval per GUANACO Chandra.  Treatment as follows:  Cleansed with sterile NS.  Patted dry.  Applied barrier around site.  Then honey to wound with overlapping large optifoam pad from left gluteal.  Sticky border cut off where it would touch other wound. Pt autumn well.
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
## 2020-06-02 NOTE — SIGNIFICANT NOTE
6/2/20 1342  eval per GUANACO Chandra.  Treatment as follows:  Cleansed with NS.  Patted dry.  Purachol AG 2x2.2 in x 2 dresssings into wound base.  Then packed with remainder of one piece of opticel from left gluteal into the wound.  Covered site with large optifoam pad with borders.  Pt autumn well.

## 2020-06-02 NOTE — SIGNIFICANT NOTE
6/2/20 1342 Area evaluated per DEJA Chandra.  Treatment as follows: Purachol AG   2 x2.2 inch x 3. Then cut  6x6 in opticell in circular bullseye pattern and divided between left and right gluteal area.  Opticell then packed into site.  Covered site with large optifoam with borders.  Pt autumn well.

## 2020-06-15 ENCOUNTER — HOSPITAL ENCOUNTER (OUTPATIENT)
Dept: WOUND CARE | Facility: HOSPITAL | Age: 45
Discharge: HOME | End: 2020-06-15
Admitting: NURSE PRACTITIONER

## 2020-06-15 VITALS
SYSTOLIC BLOOD PRESSURE: 138 MMHG | DIASTOLIC BLOOD PRESSURE: 79 MMHG | HEART RATE: 75 BPM | RESPIRATION RATE: 17 BRPM | TEMPERATURE: 98.7 F

## 2020-06-15 DIAGNOSIS — L89.304 PRESSURE INJURY OF BUTTOCK, STAGE 4, UNSPECIFIED LATERALITY (HCC): Primary | ICD-10-CM

## 2020-06-15 PROCEDURE — 63710000001 MAGIC BARRIER CREAM 60 G BOTTLE: Performed by: NURSE PRACTITIONER

## 2020-06-15 PROCEDURE — A9270 NON-COVERED ITEM OR SERVICE: HCPCS | Performed by: NURSE PRACTITIONER

## 2020-06-15 RX ADMIN — Medication: at 14:11

## 2020-06-15 NOTE — PROGRESS NOTES
Wound Care Procedure Note     Patient Identification:  Name:  Ken Krueger  Age:  44 y.o.  Sex:  male  :  1975  MRN:  [id number]   Visit Number:  [id number]  Primary Care Physician:  Provider, No Known    Pre-Procedure  Pre-Procedure Diagnosis: Stage 4 pressure injury to left gluteal  Consent:Consent obtained, consent given by patient,Risks Discussed with Patient and Family  , Alternatives DiscussedYes  Time out was called prior to procedure.   Pre procedure Pain assessment: None  Pre debridement measurements: Length 5.9cm,Width 2.5cm, depth 3cm, sinus/tunnelYes 6.2cm, undermining No    Post Procedure  Post-Procedure Diagnosis: Stage 4 pressure injury to left gluteal  Post debridement measurements: Length 6.0cm,Width 2.6cm, depth 3.2cm, sinus/tunnelYes 6.2cm, undermining No  Post procedure Pain assessment: None  Graft/Implant/Prosthetics/Implanted Device/Transplants:  None  Complication(s):  None    Procedure details:    Method of Debridement: excissional (Surgical removal or cutting away, outside or beyond the wound margin devitalized tissue, necrosis or slough.)  Instrument(s) used: curette  Type of Anesthetic: Lidocaine  Tissue removed: subuctaneous, Percent Removed 100%  Culture or Biopsy: None  Estimated Blood Loss: Small  Controlled blood loss: pressure    GUANACO Chandra   [facility]  06/15/20  14:19
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
## 2020-06-17 ENCOUNTER — TELEPHONE (OUTPATIENT)
Dept: WOUND CARE | Facility: HOSPITAL | Age: 45
End: 2020-06-17

## 2020-06-17 NOTE — TELEPHONE ENCOUNTER
Patients orders below faxed Home Health Nurse at [facility] [phone].        Ken Thornton current treatment:    Honey to base of gluteal wounds, pack with calcium alginate, and cover with Meplix.    Cathie anticipates placing a wound vac on 6/26/2020, this will be changed in the clinic once a week on Mondays with Home health changing the vac on Wednesdays and Friday and PRN AS NEEDED.    Any questions, Please call at [phone], fax [phone]

## 2020-06-29 ENCOUNTER — APPOINTMENT (OUTPATIENT)
Dept: WOUND CARE | Facility: HOSPITAL | Age: 45
End: 2020-06-29

## 2020-06-30 ENCOUNTER — APPOINTMENT (OUTPATIENT)
Dept: WOUND CARE | Facility: HOSPITAL | Age: 45
End: 2020-06-30

## 2020-07-01 ENCOUNTER — HOSPITAL ENCOUNTER (OUTPATIENT)
Dept: WOUND CARE | Facility: HOSPITAL | Age: 45
Discharge: HOME | End: 2020-07-01
Admitting: NURSE PRACTITIONER

## 2020-07-01 VITALS
HEART RATE: 73 BPM | DIASTOLIC BLOOD PRESSURE: 73 MMHG | SYSTOLIC BLOOD PRESSURE: 153 MMHG | RESPIRATION RATE: 17 BRPM | TEMPERATURE: 98.9 F

## 2020-07-01 DIAGNOSIS — L08.9 INFECTED DECUBITUS ULCER, UNSPECIFIED ULCER STAGE: ICD-10-CM

## 2020-07-01 DIAGNOSIS — L89.90 INFECTED DECUBITUS ULCER, UNSPECIFIED ULCER STAGE: ICD-10-CM

## 2020-07-01 PROCEDURE — 97605 NEG PRS WND THER DME<=50SQCM: CPT

## 2020-07-01 PROCEDURE — A9270 NON-COVERED ITEM OR SERVICE: HCPCS | Performed by: NURSE PRACTITIONER

## 2020-07-01 PROCEDURE — 63710000001 MAGIC BARRIER CREAM 60 G BOTTLE: Performed by: NURSE PRACTITIONER
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
## 2020-07-03 ENCOUNTER — HOSPITAL ENCOUNTER (OUTPATIENT)
Dept: INFUSION THERAPY | Facility: HOSPITAL | Age: 45
Discharge: HOME | End: 2020-07-03
Admitting: NURSE PRACTITIONER

## 2020-07-03 VITALS
TEMPERATURE: 98.2 F | DIASTOLIC BLOOD PRESSURE: 70 MMHG | HEART RATE: 73 BPM | SYSTOLIC BLOOD PRESSURE: 142 MMHG | RESPIRATION RATE: 20 BRPM

## 2020-07-03 DIAGNOSIS — L89.310 PRESSURE INJURY OF RIGHT BUTTOCK, UNSTAGEABLE (HCC): ICD-10-CM

## 2020-07-03 PROCEDURE — 97605 NEG PRS WND THER DME<=50SQCM: CPT

## 2020-07-06 ENCOUNTER — HOSPITAL ENCOUNTER (OUTPATIENT)
Dept: WOUND CARE | Facility: HOSPITAL | Age: 45
Discharge: HOME | End: 2020-07-06
Admitting: NURSE PRACTITIONER

## 2020-07-06 VITALS
SYSTOLIC BLOOD PRESSURE: 145 MMHG | TEMPERATURE: 98.2 F | DIASTOLIC BLOOD PRESSURE: 78 MMHG | RESPIRATION RATE: 18 BRPM | HEART RATE: 78 BPM

## 2020-07-06 DIAGNOSIS — L89.90 INFECTED DECUBITUS ULCER, UNSPECIFIED ULCER STAGE: ICD-10-CM

## 2020-07-06 DIAGNOSIS — L08.9 INFECTED DECUBITUS ULCER, UNSPECIFIED ULCER STAGE: ICD-10-CM

## 2020-07-06 PROCEDURE — 63710000001 MAGIC BARRIER CREAM 60 G BOTTLE: Performed by: NURSE PRACTITIONER

## 2020-07-06 PROCEDURE — A9270 NON-COVERED ITEM OR SERVICE: HCPCS | Performed by: NURSE PRACTITIONER
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
## 2020-07-08 ENCOUNTER — TELEPHONE (OUTPATIENT)
Dept: WOUND CARE | Facility: HOSPITAL | Age: 45
End: 2020-07-08

## 2020-07-14 ENCOUNTER — HOSPITAL ENCOUNTER (OUTPATIENT)
Dept: WOUND CARE | Facility: HOSPITAL | Age: 45
Discharge: HOME | End: 2020-07-14
Admitting: NURSE PRACTITIONER

## 2020-07-14 VITALS
TEMPERATURE: 98.6 F | SYSTOLIC BLOOD PRESSURE: 166 MMHG | RESPIRATION RATE: 18 BRPM | DIASTOLIC BLOOD PRESSURE: 87 MMHG | HEART RATE: 67 BPM

## 2020-07-14 DIAGNOSIS — L89.304 PRESSURE INJURY OF BUTTOCK, STAGE 4, UNSPECIFIED LATERALITY (HCC): Primary | ICD-10-CM

## 2020-07-14 PROCEDURE — 97602 WOUND(S) CARE NON-SELECTIVE: CPT

## 2020-07-14 PROCEDURE — A9270 NON-COVERED ITEM OR SERVICE: HCPCS | Performed by: NURSE PRACTITIONER

## 2020-07-14 PROCEDURE — 63710000001 MAGIC BARRIER CREAM 60 G BOTTLE: Performed by: NURSE PRACTITIONER

## 2020-07-14 RX ADMIN — Medication: at 14:40

## 2020-07-14 NOTE — PATIENT INSTRUCTIONS
Wound Care, Adult  Taking care of your wound properly can help to prevent pain, infection, and scarring. It can also help your wound to heal more quickly.  How to care for your wound  Wound care         · Follow instructions from your health care provider about how to take care of your wound. Make sure you:  ? Wash your hands with soap and water before you change the bandage (dressing). If soap and water are not available, use hand .  ? Change your dressing as told by your health care provider.  ? Leave stitches (sutures), skin glue, or adhesive strips in place. These skin closures may need to stay in place for 2 weeks or longer. If adhesive strip edges start to loosen and curl up, you may trim the loose edges. Do not remove adhesive strips completely unless your health care provider tells you to do that.  · Check your wound area every day for signs of infection. Check for:  ? Redness, swelling, or pain.  ? Fluid or blood.  ? Warmth.  ? Pus or a bad smell.  · Ask your health care provider if you should clean the wound with mild soap and water. Doing this may include:  ? Using a clean towel to pat the wound dry after cleaning it. Do not rub or scrub the wound.  ? Applying a cream or ointment. Do this only as told by your health care provider.  ? Covering the incision with a clean dressing.  · Ask your health care provider when you can leave the wound uncovered.  · Keep the dressing dry until your health care provider says it can be removed. Do not take baths, swim, use a hot tub, or do anything that would put the wound underwater until your health care provider approves. Ask your health care provider if you can take showers. You may only be allowed to take sponge baths.  Medicines    · If you were prescribed an antibiotic medicine, cream, or ointment, take or use the antibiotic as told by your health care provider. Do not stop taking or using the antibiotic even if your condition improves.  · Take  over-the-counter and prescription medicines only as told by your health care provider. If you were prescribed pain medicine, take it 30 or more minutes before you do any wound care or as told by your health care provider.  General instructions  · Return to your normal activities as told by your health care provider. Ask your health care provider what activities are safe.  · Do not scratch or pick at the wound.  · Do not use any products that contain nicotine or tobacco, such as cigarettes and e-cigarettes. These may delay wound healing. If you need help quitting, ask your health care provider.  · Keep all follow-up visits as told by your health care provider. This is important.  · Eat a diet that includes protein, vitamin A, vitamin C, and other nutrient-rich foods to help the wound heal.  ? Foods rich in protein include meat, dairy, beans, nuts, and other sources.  ? Foods rich in vitamin A include carrots and dark green, leafy vegetables.  ? Foods rich in vitamin C include citrus, tomatoes, and other fruits and vegetables.  ? Nutrient-rich foods have protein, carbohydrates, fat, vitamins, or minerals. Eat a variety of healthy foods including vegetables, fruits, and whole grains.  Contact a health care provider if:  · You received a tetanus shot and you have swelling, severe pain, redness, or bleeding at the injection site.  · Your pain is not controlled with medicine.  · You have redness, swelling, or pain around the wound.  · You have fluid or blood coming from the wound.  · Your wound feels warm to the touch.  · You have pus or a bad smell coming from the wound.  · You have a fever or chills.  · You are nauseous or you vomit.  · You are dizzy.  Get help right away if:  · You have a red streak going away from your wound.  · The edges of the wound open up and separate.  · Your wound is bleeding, and the bleeding does not stop with gentle pressure.  · You have a rash.  · You faint.  · You have trouble  breathing.  Summary  · Always wash your hands with soap and water before changing your bandage (dressing).  · To help with healing, eat foods that are rich in protein, vitamin A, vitamin C, and other nutrients.  · Check your wound every day for signs of infection. Contact your health care provider if you suspect that your wound is infected.  This information is not intended to replace advice given to you by your health care provider. Make sure you discuss any questions you have with your health care provider.  Document Released: 09/26/2009 Document Revised: 04/06/2020 Document Reviewed: 07/04/2017                                                                  .  Late to Work or School  ___YECENIA DENG will be late to:  __X__ Work.  ____ School.  He brought his brother for wound care at our facility at __2:15_______(a.m./p.m.).  Health care provider name (printed):  Felicia Goodman RN  Health care provider (signature): _____________________________________________________________  Date __7- 1440  This I nformation is not intended to replace advice given to you by your health care provider. Make sure you discuss any questions you have with your health care provider.  Document Released: 03/09/2005 Document Revised: 12/13/2018 Document Reviewed: 12/13/2018  [employer]vier Patient Education © 2020 [employer]
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
## 2020-07-27 ENCOUNTER — HOSPITAL ENCOUNTER (OUTPATIENT)
Dept: WOUND CARE | Facility: HOSPITAL | Age: 45
Discharge: HOME | End: 2020-07-27
Admitting: NURSE PRACTITIONER

## 2020-07-27 VITALS
TEMPERATURE: 98.7 F | HEART RATE: 77 BPM | DIASTOLIC BLOOD PRESSURE: 65 MMHG | SYSTOLIC BLOOD PRESSURE: 135 MMHG | RESPIRATION RATE: 19 BRPM

## 2020-07-27 DIAGNOSIS — L08.9 INFECTED DECUBITUS ULCER, UNSPECIFIED ULCER STAGE: Primary | ICD-10-CM

## 2020-07-27 DIAGNOSIS — L89.90 INFECTED DECUBITUS ULCER, UNSPECIFIED ULCER STAGE: Primary | ICD-10-CM

## 2020-07-27 PROCEDURE — 63710000001 MAGIC BARRIER CREAM 60 G BOTTLE: Performed by: NURSE PRACTITIONER

## 2020-07-27 PROCEDURE — 97602 WOUND(S) CARE NON-SELECTIVE: CPT

## 2020-07-27 PROCEDURE — A9270 NON-COVERED ITEM OR SERVICE: HCPCS | Performed by: NURSE PRACTITIONER

## 2020-07-27 RX ADMIN — Medication 2 APPLICATION: at 14:40

## 2020-08-03 ENCOUNTER — APPOINTMENT (OUTPATIENT)
Dept: WOUND CARE | Facility: HOSPITAL | Age: 45
End: 2020-08-03

## 2020-08-04 ENCOUNTER — APPOINTMENT (OUTPATIENT)
Dept: WOUND CARE | Facility: HOSPITAL | Age: 45
End: 2020-08-04

## 2020-08-10 ENCOUNTER — APPOINTMENT (OUTPATIENT)
Dept: WOUND CARE | Facility: HOSPITAL | Age: 45
End: 2020-08-10

## 2020-08-11 ENCOUNTER — HOSPITAL ENCOUNTER (OUTPATIENT)
Dept: WOUND CARE | Facility: HOSPITAL | Age: 45
Discharge: HOME | End: 2020-08-11
Admitting: NURSE PRACTITIONER

## 2020-08-11 VITALS
RESPIRATION RATE: 18 BRPM | SYSTOLIC BLOOD PRESSURE: 113 MMHG | TEMPERATURE: 99.1 F | HEART RATE: 101 BPM | DIASTOLIC BLOOD PRESSURE: 60 MMHG

## 2020-08-11 DIAGNOSIS — L89.90 INFECTED DECUBITUS ULCER, UNSPECIFIED ULCER STAGE: Primary | ICD-10-CM

## 2020-08-11 DIAGNOSIS — L08.9 INFECTED DECUBITUS ULCER, UNSPECIFIED ULCER STAGE: Primary | ICD-10-CM

## 2020-08-11 PROCEDURE — 63710000001 MAGIC BARRIER CREAM 60 G BOTTLE: Performed by: NURSE PRACTITIONER

## 2020-08-11 PROCEDURE — 97605 NEG PRS WND THER DME<=50SQCM: CPT

## 2020-08-11 PROCEDURE — A9270 NON-COVERED ITEM OR SERVICE: HCPCS | Performed by: NURSE PRACTITIONER

## 2020-08-11 RX ADMIN — Medication: at 16:34
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
## 2020-08-18 ENCOUNTER — APPOINTMENT (OUTPATIENT)
Dept: WOUND CARE | Facility: HOSPITAL | Age: 45
End: 2020-08-18

## 2020-08-24 ENCOUNTER — HOSPITAL ENCOUNTER (OUTPATIENT)
Dept: WOUND CARE | Facility: HOSPITAL | Age: 45
Discharge: HOME | End: 2020-08-24
Admitting: NURSE PRACTITIONER

## 2020-08-24 VITALS
DIASTOLIC BLOOD PRESSURE: 84 MMHG | TEMPERATURE: 98.4 F | RESPIRATION RATE: 18 BRPM | HEART RATE: 82 BPM | SYSTOLIC BLOOD PRESSURE: 168 MMHG

## 2020-08-24 DIAGNOSIS — L89.310 PRESSURE INJURY OF RIGHT BUTTOCK, UNSTAGEABLE (HCC): ICD-10-CM

## 2020-08-24 PROCEDURE — 63710000001 SODIUM HYPOCHLORITE 0.25 % SOLUTION 473 ML BOTTLE: Performed by: NURSE PRACTITIONER

## 2020-08-24 PROCEDURE — 63710000001 MAGIC BARRIER CREAM 60 G BOTTLE: Performed by: NURSE PRACTITIONER

## 2020-08-24 PROCEDURE — 97605 NEG PRS WND THER DME<=50SQCM: CPT

## 2020-08-24 PROCEDURE — A9270 NON-COVERED ITEM OR SERVICE: HCPCS | Performed by: NURSE PRACTITIONER

## 2020-08-24 RX ORDER — SODIUM HYPOCHLORITE 2.5 MG/ML
SOLUTION TOPICAL ONCE
Status: COMPLETED | OUTPATIENT
Start: 2020-08-24 | End: 2020-08-24

## 2020-08-24 RX ORDER — SODIUM HYPOCHLORITE 2.5 MG/ML
SOLUTION TOPICAL ONCE
Status: DISCONTINUED | OUTPATIENT
Start: 2020-08-24 | End: 2020-08-25 | Stop reason: HOSPADM

## 2020-08-24 RX ADMIN — SODIUM HYPOCHLORITE 473 ML: 2.5 SOLUTION TOPICAL at 14:04

## 2020-08-24 RX ADMIN — Medication: at 14:04

## 2020-08-31 ENCOUNTER — HOSPITAL ENCOUNTER (OUTPATIENT)
Dept: WOUND CARE | Facility: HOSPITAL | Age: 45
Discharge: HOME | End: 2020-08-31

## 2020-08-31 VITALS
RESPIRATION RATE: 18 BRPM | TEMPERATURE: 98.6 F | SYSTOLIC BLOOD PRESSURE: 169 MMHG | DIASTOLIC BLOOD PRESSURE: 74 MMHG | HEART RATE: 71 BPM

## 2020-08-31 DIAGNOSIS — L89.310 PRESSURE INJURY OF RIGHT BUTTOCK, UNSTAGEABLE (HCC): ICD-10-CM

## 2020-08-31 PROCEDURE — A9270 NON-COVERED ITEM OR SERVICE: HCPCS | Performed by: NURSE PRACTITIONER

## 2020-08-31 PROCEDURE — 63710000001 MAGIC BARRIER CREAM 60 G BOTTLE: Performed by: NURSE PRACTITIONER

## 2020-08-31 RX ADMIN — Medication 1 APPLICATION: at 17:08

## 2020-09-08 ENCOUNTER — APPOINTMENT (OUTPATIENT)
Dept: WOUND CARE | Facility: HOSPITAL | Age: 45
End: 2020-09-08

## 2020-09-21 ENCOUNTER — HOSPITAL ENCOUNTER (OUTPATIENT)
Dept: WOUND CARE | Facility: HOSPITAL | Age: 45
Discharge: HOME | End: 2020-09-21
Admitting: NURSE PRACTITIONER

## 2020-09-21 VITALS
HEART RATE: 79 BPM | RESPIRATION RATE: 17 BRPM | SYSTOLIC BLOOD PRESSURE: 138 MMHG | TEMPERATURE: 98 F | DIASTOLIC BLOOD PRESSURE: 68 MMHG

## 2020-09-21 DIAGNOSIS — L89.90 INFECTED DECUBITUS ULCER, UNSPECIFIED ULCER STAGE: ICD-10-CM

## 2020-09-21 DIAGNOSIS — L08.9 INFECTED DECUBITUS ULCER, UNSPECIFIED ULCER STAGE: ICD-10-CM

## 2020-09-21 PROCEDURE — 63710000001 MAGIC BARRIER CREAM 60 G BOTTLE: Performed by: NURSE PRACTITIONER

## 2020-09-21 PROCEDURE — 97602 WOUND(S) CARE NON-SELECTIVE: CPT

## 2020-09-21 PROCEDURE — A9270 NON-COVERED ITEM OR SERVICE: HCPCS | Performed by: NURSE PRACTITIONER

## 2020-09-21 RX ADMIN — Medication: at 15:28
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
## 2020-09-29 ENCOUNTER — APPOINTMENT (OUTPATIENT)
Dept: WOUND CARE | Facility: HOSPITAL | Age: 45
End: 2020-09-29

## 2020-10-22 ENCOUNTER — APPOINTMENT (OUTPATIENT)
Dept: WOUND CARE | Facility: HOSPITAL | Age: 45
End: 2020-10-22

## 2020-10-26 ENCOUNTER — HOSPITAL ENCOUNTER (OUTPATIENT)
Dept: WOUND CARE | Facility: HOSPITAL | Age: 45
Discharge: HOME | End: 2020-10-26
Admitting: NURSE PRACTITIONER

## 2020-10-26 VITALS
HEART RATE: 84 BPM | RESPIRATION RATE: 18 BRPM | SYSTOLIC BLOOD PRESSURE: 129 MMHG | TEMPERATURE: 98.7 F | DIASTOLIC BLOOD PRESSURE: 70 MMHG

## 2020-10-26 DIAGNOSIS — L89.314 PRESSURE INJURY OF RIGHT BUTTOCK, STAGE 4 (HCC): ICD-10-CM

## 2020-10-26 DIAGNOSIS — L89.324 PRESSURE INJURY OF LEFT BUTTOCK, STAGE 4 (HCC): Primary | ICD-10-CM

## 2020-10-26 PROCEDURE — 97602 WOUND(S) CARE NON-SELECTIVE: CPT

## 2020-10-26 PROCEDURE — A9270 NON-COVERED ITEM OR SERVICE: HCPCS | Performed by: NURSE PRACTITIONER

## 2020-10-26 PROCEDURE — 63710000001 MAGIC BARRIER CREAM 60 G BOTTLE: Performed by: NURSE PRACTITIONER

## 2020-10-26 RX ADMIN — Medication 1 APPLICATION: at 14:45

## 2020-11-02 ENCOUNTER — HOSPITAL ENCOUNTER (OUTPATIENT)
Dept: WOUND CARE | Facility: HOSPITAL | Age: 45
Discharge: HOME HEALTH | End: 2020-11-02
Admitting: NURSE PRACTITIONER

## 2020-11-02 VITALS
RESPIRATION RATE: 18 BRPM | HEART RATE: 81 BPM | SYSTOLIC BLOOD PRESSURE: 141 MMHG | TEMPERATURE: 98.6 F | DIASTOLIC BLOOD PRESSURE: 81 MMHG

## 2020-11-02 DIAGNOSIS — L89.324 PRESSURE INJURY OF LEFT BUTTOCK, STAGE 4 (HCC): Primary | ICD-10-CM

## 2020-11-02 DIAGNOSIS — L89.314 PRESSURE INJURY OF RIGHT BUTTOCK, STAGE 4 (HCC): ICD-10-CM

## 2020-11-02 PROCEDURE — 63710000001 MAGIC BARRIER CREAM 60 G BOTTLE: Performed by: NURSE PRACTITIONER

## 2020-11-02 PROCEDURE — A9270 NON-COVERED ITEM OR SERVICE: HCPCS | Performed by: NURSE PRACTITIONER

## 2020-11-02 PROCEDURE — 97602 WOUND(S) CARE NON-SELECTIVE: CPT

## 2020-11-02 PROCEDURE — 97607 NEG PRS WND THR NDME<=50SQCM: CPT

## 2020-11-02 RX ADMIN — Medication: at 14:20
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
## 2020-11-09 ENCOUNTER — HOSPITAL ENCOUNTER (OUTPATIENT)
Dept: WOUND CARE | Facility: HOSPITAL | Age: 45
Discharge: HOME | End: 2020-11-09

## 2020-11-09 VITALS
SYSTOLIC BLOOD PRESSURE: 131 MMHG | HEART RATE: 93 BPM | TEMPERATURE: 98.3 F | DIASTOLIC BLOOD PRESSURE: 78 MMHG | RESPIRATION RATE: 20 BRPM

## 2020-11-09 DIAGNOSIS — L89.314 PRESSURE INJURY OF RIGHT BUTTOCK, STAGE 4 (HCC): ICD-10-CM

## 2020-11-09 DIAGNOSIS — L89.324 PRESSURE INJURY OF LEFT BUTTOCK, STAGE 4 (HCC): Primary | ICD-10-CM

## 2020-11-09 PROCEDURE — 63710000001 MAGIC BARRIER CREAM 60 G BOTTLE: Performed by: NURSE PRACTITIONER

## 2020-11-09 PROCEDURE — A9270 NON-COVERED ITEM OR SERVICE: HCPCS | Performed by: NURSE PRACTITIONER

## 2020-11-09 RX ADMIN — Medication 1 APPLICATION: at 14:10

## 2020-11-09 NOTE — PROGRESS NOTES
Wound Care Procedure Note     Patient Identification:  Name:  Ken Krueger  Age:  45 y.o.  Sex:  male  :  1975  MRN:  [id number]   Visit Number:  [id number]  Primary Care Physician:  Provider, No Known    Pre-Procedure  Pre-Procedure Diagnosis: Stage 4 pressure injury to left gluteal  Consent:Consent obtained, consent given by patient,Risks Discussed with Patient and Family  , Alternatives DiscussedYes  Time out was called prior to procedure.   Pre procedure Pain assessment: None  Pre debridement measurements: Length 3.5cm,Width 2.0cm, depth 3.1cm, sinus/tunnelYes 6.2cm, undermining No    Post Procedure  Post-Procedure Diagnosis: Stage 4 pressure injury to left gluteal  Post debridement measurements: Length 4.2cm,Width 2.8cm, depth 3.2cm, sinus/tunnelYes 4cm, undermining No  Post procedure Pain assessment: None  Graft/Implant/Prosthetics/Implanted Device/Transplants:  None  Complication(s):  None    Procedure details:    Method of Debridement: excissional (Surgical removal or cutting away, outside or beyond the wound margin devitalized tissue, necrosis or slough.)  Instrument(s) used: curette  Type of Anesthetic: Lidocaine  Tissue removed: subuctaneous, Percent Removed 100%  Culture or Biopsy: None  Estimated Blood Loss: Small  Controlled blood loss: pressure    GUANACO Chandra   [facility]  20  13:45 EST
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
## 2020-11-16 ENCOUNTER — HOSPITAL ENCOUNTER (OUTPATIENT)
Dept: WOUND CARE | Facility: HOSPITAL | Age: 45
Discharge: HOME | End: 2020-11-16
Admitting: NURSE PRACTITIONER

## 2020-11-16 VITALS
HEART RATE: 99 BPM | RESPIRATION RATE: 18 BRPM | TEMPERATURE: 98.8 F | SYSTOLIC BLOOD PRESSURE: 134 MMHG | DIASTOLIC BLOOD PRESSURE: 78 MMHG

## 2020-11-16 DIAGNOSIS — L89.324 PRESSURE INJURY OF LEFT BUTTOCK, STAGE 4 (HCC): Primary | ICD-10-CM

## 2020-11-16 DIAGNOSIS — L89.314 PRESSURE INJURY OF RIGHT BUTTOCK, STAGE 4 (HCC): ICD-10-CM

## 2020-11-16 PROCEDURE — 97607 NEG PRS WND THR NDME<=50SQCM: CPT

## 2020-11-16 PROCEDURE — 97602 WOUND(S) CARE NON-SELECTIVE: CPT

## 2020-11-16 PROCEDURE — A9270 NON-COVERED ITEM OR SERVICE: HCPCS | Performed by: NURSE PRACTITIONER

## 2020-11-16 PROCEDURE — 63710000001 MAGIC BARRIER CREAM 60 G BOTTLE: Performed by: NURSE PRACTITIONER

## 2020-11-16 RX ADMIN — Medication 1 APPLICATION: at 14:50
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
## 2020-11-17 NOTE — ADDENDUM NOTE
Encounter addended by: Cathie Handy APRN on: 11/17/2020 6:24 AM   Actions taken: Clinical Note Signed

## 2020-11-23 ENCOUNTER — APPOINTMENT (OUTPATIENT)
Dept: WOUND CARE | Facility: HOSPITAL | Age: 45
End: 2020-11-23

## 2020-11-30 ENCOUNTER — HOSPITAL ENCOUNTER (OUTPATIENT)
Dept: WOUND CARE | Facility: HOSPITAL | Age: 45
Discharge: HOME | End: 2020-11-30
Admitting: NURSE PRACTITIONER

## 2020-11-30 VITALS
SYSTOLIC BLOOD PRESSURE: 154 MMHG | TEMPERATURE: 98.9 F | HEART RATE: 86 BPM | RESPIRATION RATE: 18 BRPM | DIASTOLIC BLOOD PRESSURE: 66 MMHG

## 2020-11-30 DIAGNOSIS — L89.324 PRESSURE INJURY OF LEFT BUTTOCK, STAGE 4 (HCC): Primary | ICD-10-CM

## 2020-11-30 DIAGNOSIS — L89.314 PRESSURE INJURY OF RIGHT BUTTOCK, STAGE 4 (HCC): ICD-10-CM

## 2020-11-30 PROCEDURE — 97607 NEG PRS WND THR NDME<=50SQCM: CPT

## 2020-11-30 PROCEDURE — 97602 WOUND(S) CARE NON-SELECTIVE: CPT

## 2020-11-30 PROCEDURE — 63710000001 MAGIC BARRIER CREAM 60 G BOTTLE: Performed by: NURSE PRACTITIONER

## 2020-11-30 PROCEDURE — 97608 NEG PRS WND THER NDME>50SQCM: CPT

## 2020-11-30 PROCEDURE — A9270 NON-COVERED ITEM OR SERVICE: HCPCS | Performed by: NURSE PRACTITIONER

## 2020-11-30 RX ADMIN — Medication 1 APPLICATION: at 16:18
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
## 2020-12-07 ENCOUNTER — APPOINTMENT (OUTPATIENT)
Dept: WOUND CARE | Facility: HOSPITAL | Age: 45
End: 2020-12-07

## 2020-12-14 ENCOUNTER — HOSPITAL ENCOUNTER (OUTPATIENT)
Dept: WOUND CARE | Facility: HOSPITAL | Age: 45
Discharge: HOME | End: 2020-12-14
Admitting: NURSE PRACTITIONER

## 2020-12-14 VITALS
DIASTOLIC BLOOD PRESSURE: 83 MMHG | HEART RATE: 86 BPM | RESPIRATION RATE: 20 BRPM | SYSTOLIC BLOOD PRESSURE: 164 MMHG | TEMPERATURE: 98.5 F

## 2020-12-14 DIAGNOSIS — L89.314 PRESSURE INJURY OF RIGHT BUTTOCK, STAGE 4 (HCC): ICD-10-CM

## 2020-12-14 DIAGNOSIS — L89.324 PRESSURE INJURY OF LEFT BUTTOCK, STAGE 4 (HCC): Primary | ICD-10-CM

## 2020-12-14 PROCEDURE — A9270 NON-COVERED ITEM OR SERVICE: HCPCS | Performed by: NURSE PRACTITIONER

## 2020-12-14 PROCEDURE — 97606 NEG PRS WND THER DME>50 SQCM: CPT

## 2020-12-14 PROCEDURE — 63710000001 MAGIC BARRIER CREAM 60 G BOTTLE: Performed by: NURSE PRACTITIONER

## 2020-12-14 RX ADMIN — Medication 1 APPLICATION: at 14:56
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
## 2020-12-14 NOTE — ADDENDUM NOTE
Encounter addended by: Lin Wade RN on: 12/14/2020 3:47 PM   Actions taken: Flowsheet accepted, Visit diagnoses modified

## 2020-12-21 ENCOUNTER — HOSPITAL ENCOUNTER (OUTPATIENT)
Dept: WOUND CARE | Facility: HOSPITAL | Age: 45
Discharge: HOME | End: 2020-12-21
Admitting: NURSE PRACTITIONER

## 2020-12-21 VITALS
DIASTOLIC BLOOD PRESSURE: 89 MMHG | HEART RATE: 93 BPM | TEMPERATURE: 98.7 F | RESPIRATION RATE: 18 BRPM | SYSTOLIC BLOOD PRESSURE: 158 MMHG

## 2020-12-21 DIAGNOSIS — L89.324 PRESSURE INJURY OF LEFT BUTTOCK, STAGE 4 (HCC): Primary | ICD-10-CM

## 2020-12-21 DIAGNOSIS — L89.314 PRESSURE INJURY OF RIGHT BUTTOCK, STAGE 4 (HCC): ICD-10-CM

## 2020-12-21 PROCEDURE — 63710000001 MAGIC BARRIER CREAM 60 G BOTTLE: Performed by: NURSE PRACTITIONER

## 2020-12-21 PROCEDURE — A9270 NON-COVERED ITEM OR SERVICE: HCPCS | Performed by: NURSE PRACTITIONER

## 2020-12-21 PROCEDURE — 97605 NEG PRS WND THER DME<=50SQCM: CPT

## 2020-12-21 RX ADMIN — Medication: at 14:13
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
## 2020-12-30 ENCOUNTER — APPOINTMENT (OUTPATIENT)
Dept: WOUND CARE | Facility: HOSPITAL | Age: 45
End: 2020-12-30

## 2021-01-06 ENCOUNTER — HOSPITAL ENCOUNTER (OUTPATIENT)
Dept: WOUND CARE | Facility: HOSPITAL | Age: 46
Discharge: HOME | End: 2021-01-06
Admitting: NURSE PRACTITIONER

## 2021-01-06 VITALS
TEMPERATURE: 98.2 F | DIASTOLIC BLOOD PRESSURE: 95 MMHG | HEART RATE: 94 BPM | RESPIRATION RATE: 20 BRPM | SYSTOLIC BLOOD PRESSURE: 183 MMHG

## 2021-01-06 DIAGNOSIS — L08.9 INFECTED DECUBITUS ULCER, UNSPECIFIED ULCER STAGE: ICD-10-CM

## 2021-01-06 DIAGNOSIS — L89.90 INFECTED DECUBITUS ULCER, UNSPECIFIED ULCER STAGE: ICD-10-CM

## 2021-01-06 DIAGNOSIS — L89.002 PRESSURE INJURY OF ELBOW, STAGE 2, UNSPECIFIED LATERALITY (HCC): Primary | ICD-10-CM

## 2021-01-06 PROCEDURE — A9270 NON-COVERED ITEM OR SERVICE: HCPCS | Performed by: NURSE PRACTITIONER

## 2021-01-06 PROCEDURE — 97602 WOUND(S) CARE NON-SELECTIVE: CPT

## 2021-01-06 PROCEDURE — 63710000001 MAGIC BARRIER CREAM 60 G BOTTLE: Performed by: NURSE PRACTITIONER

## 2021-01-06 RX ORDER — LIDOCAINE HYDROCHLORIDE 20 MG/ML
JELLY TOPICAL AS NEEDED
Status: CANCELLED | OUTPATIENT
Start: 2021-01-13

## 2021-01-06 RX ORDER — SODIUM HYPOCHLORITE 2.5 MG/ML
1 SOLUTION TOPICAL AS NEEDED
Status: CANCELLED | OUTPATIENT
Start: 2021-01-13

## 2021-01-06 RX ORDER — SODIUM HYPOCHLORITE 1.25 MG/ML
1 SOLUTION TOPICAL AS NEEDED
Status: CANCELLED | OUTPATIENT
Start: 2021-01-27

## 2021-01-06 RX ORDER — SODIUM HYPOCHLORITE 1.25 MG/ML
1 SOLUTION TOPICAL AS NEEDED
Status: CANCELLED | OUTPATIENT
Start: 2021-01-06

## 2021-01-06 RX ORDER — CASTOR OIL AND BALSAM, PERU 788; 87 MG/G; MG/G
1 OINTMENT TOPICAL AS NEEDED
Status: CANCELLED | OUTPATIENT
Start: 2021-01-13

## 2021-01-06 RX ADMIN — Medication 1 APPLICATION: at 15:42
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
## 2021-01-13 ENCOUNTER — HOSPITAL ENCOUNTER (OUTPATIENT)
Dept: WOUND CARE | Facility: HOSPITAL | Age: 46
Discharge: HOME | End: 2021-01-13

## 2021-01-13 ENCOUNTER — HOSPITAL ENCOUNTER (OUTPATIENT)
Dept: ULTRASOUND IMAGING | Facility: HOSPITAL | Age: 46
Discharge: HOME | End: 2021-01-13

## 2021-01-13 VITALS
RESPIRATION RATE: 18 BRPM | TEMPERATURE: 97.3 F | SYSTOLIC BLOOD PRESSURE: 101 MMHG | DIASTOLIC BLOOD PRESSURE: 66 MMHG | HEART RATE: 98 BPM

## 2021-01-13 DIAGNOSIS — L89.304 PRESSURE INJURY OF BUTTOCK, STAGE 4, UNSPECIFIED LATERALITY (HCC): ICD-10-CM

## 2021-01-13 DIAGNOSIS — L89.90 INFECTED DECUBITUS ULCER, UNSPECIFIED ULCER STAGE: ICD-10-CM

## 2021-01-13 DIAGNOSIS — L89.002 PRESSURE INJURY OF ELBOW, STAGE 2, UNSPECIFIED LATERALITY (HCC): Primary | ICD-10-CM

## 2021-01-13 DIAGNOSIS — L08.9 INFECTED DECUBITUS ULCER, UNSPECIFIED ULCER STAGE: ICD-10-CM

## 2021-01-13 DIAGNOSIS — L89.324 PRESSURE INJURY OF LEFT BUTTOCK, STAGE 4 (HCC): ICD-10-CM

## 2021-01-13 PROCEDURE — 97605 NEG PRS WND THER DME<=50SQCM: CPT

## 2021-01-13 PROCEDURE — 76604 US EXAM CHEST: CPT | Performed by: RADIOLOGY

## 2021-01-13 PROCEDURE — 63710000001 MAGIC BARRIER CREAM 60 G BOTTLE: Performed by: NURSE PRACTITIONER

## 2021-01-13 PROCEDURE — A9270 NON-COVERED ITEM OR SERVICE: HCPCS | Performed by: NURSE PRACTITIONER

## 2021-01-13 PROCEDURE — 76999 ECHO EXAMINATION PROCEDURE: CPT

## 2021-01-13 RX ORDER — LIDOCAINE HYDROCHLORIDE 20 MG/ML
JELLY TOPICAL AS NEEDED
Status: CANCELLED | OUTPATIENT
Start: 2021-01-27

## 2021-01-13 RX ORDER — SODIUM HYPOCHLORITE 1.25 MG/ML
1 SOLUTION TOPICAL AS NEEDED
Status: CANCELLED | OUTPATIENT
Start: 2021-01-27

## 2021-01-13 RX ORDER — CASTOR OIL AND BALSAM, PERU 788; 87 MG/G; MG/G
1 OINTMENT TOPICAL AS NEEDED
Status: CANCELLED | OUTPATIENT
Start: 2021-01-27

## 2021-01-13 RX ORDER — SODIUM HYPOCHLORITE 2.5 MG/ML
1 SOLUTION TOPICAL AS NEEDED
Status: CANCELLED | OUTPATIENT
Start: 2021-01-27

## 2021-01-13 RX ADMIN — Medication 1 APPLICATION: at 14:52
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
## 2021-01-27 ENCOUNTER — HOSPITAL ENCOUNTER (OUTPATIENT)
Dept: WOUND CARE | Facility: HOSPITAL | Age: 46
Discharge: HOME | End: 2021-01-27
Admitting: NURSE PRACTITIONER

## 2021-01-27 VITALS
TEMPERATURE: 98 F | DIASTOLIC BLOOD PRESSURE: 74 MMHG | RESPIRATION RATE: 18 BRPM | SYSTOLIC BLOOD PRESSURE: 127 MMHG | HEART RATE: 92 BPM

## 2021-01-27 DIAGNOSIS — L89.324 PRESSURE INJURY OF LEFT BUTTOCK, STAGE 4 (HCC): ICD-10-CM

## 2021-01-27 DIAGNOSIS — L89.304 PRESSURE INJURY OF BUTTOCK, STAGE 4, UNSPECIFIED LATERALITY (HCC): ICD-10-CM

## 2021-01-27 DIAGNOSIS — L89.002 PRESSURE INJURY OF ELBOW, STAGE 2, UNSPECIFIED LATERALITY (HCC): Primary | ICD-10-CM

## 2021-01-27 PROCEDURE — 63710000001 MAGIC BARRIER CREAM 60 G BOTTLE: Performed by: NURSE PRACTITIONER

## 2021-01-27 PROCEDURE — 63710000001 SODIUM HYPOCHLORITE 0.25 % SOLUTION: Performed by: NURSE PRACTITIONER

## 2021-01-27 PROCEDURE — A9270 NON-COVERED ITEM OR SERVICE: HCPCS | Performed by: NURSE PRACTITIONER

## 2021-01-27 PROCEDURE — 97602 WOUND(S) CARE NON-SELECTIVE: CPT

## 2021-01-27 RX ORDER — SODIUM HYPOCHLORITE 2.5 MG/ML
1 SOLUTION TOPICAL AS NEEDED
Status: DISCONTINUED | OUTPATIENT
Start: 2021-01-27 | End: 2021-01-28 | Stop reason: HOSPADM

## 2021-01-27 RX ORDER — SODIUM HYPOCHLORITE 1.25 MG/ML
1 SOLUTION TOPICAL AS NEEDED
Status: CANCELLED | OUTPATIENT
Start: 2021-03-03

## 2021-01-27 RX ORDER — LIDOCAINE HYDROCHLORIDE 20 MG/ML
JELLY TOPICAL AS NEEDED
Status: CANCELLED | OUTPATIENT
Start: 2021-02-24

## 2021-01-27 RX ORDER — CASTOR OIL AND BALSAM, PERU 788; 87 MG/G; MG/G
1 OINTMENT TOPICAL AS NEEDED
Status: CANCELLED | OUTPATIENT
Start: 2021-02-24

## 2021-01-27 RX ORDER — SODIUM HYPOCHLORITE 2.5 MG/ML
1 SOLUTION TOPICAL AS NEEDED
Status: CANCELLED | OUTPATIENT
Start: 2021-02-24

## 2021-01-27 RX ADMIN — HYOSCYAMINE SULFATE 473 ML: 16 SOLUTION at 14:23

## 2021-01-27 RX ADMIN — Medication 1 APPLICATION: at 14:01
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
## 2021-02-07 ENCOUNTER — HOSPITAL ENCOUNTER (INPATIENT)
Facility: HOSPITAL | Age: 46
LOS: 11 days | Discharge: HOME HEALTH | End: 2021-02-18
Attending: FAMILY MEDICINE | Admitting: INTERNAL MEDICINE

## 2021-02-07 ENCOUNTER — APPOINTMENT (OUTPATIENT)
Dept: GENERAL RADIOLOGY | Facility: HOSPITAL | Age: 46
End: 2021-02-07

## 2021-02-07 ENCOUNTER — APPOINTMENT (OUTPATIENT)
Dept: CT IMAGING | Facility: HOSPITAL | Age: 46
End: 2021-02-07

## 2021-02-07 DIAGNOSIS — G47.33 OSA (OBSTRUCTIVE SLEEP APNEA): ICD-10-CM

## 2021-02-07 DIAGNOSIS — R11.2 NON-INTRACTABLE VOMITING WITH NAUSEA, UNSPECIFIED VOMITING TYPE: ICD-10-CM

## 2021-02-07 DIAGNOSIS — L89.95 PRESSURE INJURY, UNSTAGEABLE, WITH INFECTION (HCC): ICD-10-CM

## 2021-02-07 DIAGNOSIS — A41.9 SEPSIS, DUE TO UNSPECIFIED ORGANISM, UNSPECIFIED WHETHER ACUTE ORGAN DYSFUNCTION PRESENT (HCC): Primary | ICD-10-CM

## 2021-02-07 DIAGNOSIS — L08.9 PRESSURE INJURY, UNSTAGEABLE, WITH INFECTION (HCC): ICD-10-CM

## 2021-02-07 DIAGNOSIS — R19.7 DIARRHEA, UNSPECIFIED TYPE: ICD-10-CM

## 2021-02-07 PROBLEM — R65.20 SEVERE SEPSIS (HCC): Status: ACTIVE | Noted: 2021-02-07

## 2021-02-07 LAB
ADV 40+41 DNA STL QL NAA+NON-PROBE: NOT DETECTED
ALBUMIN SERPL-MCNC: 4.02 G/DL (ref 3.5–5.2)
ALBUMIN/GLOB SERPL: 0.7 G/DL
ALP SERPL-CCNC: 114 U/L (ref 39–117)
ALT SERPL W P-5'-P-CCNC: 40 U/L (ref 1–41)
AMORPH URATE CRY URNS QL MICRO: ABNORMAL /HPF
ANION GAP SERPL CALCULATED.3IONS-SCNC: 16 MMOL/L (ref 5–15)
AST SERPL-CCNC: 24 U/L (ref 1–40)
ASTRO TYP 1-8 RNA STL QL NAA+NON-PROBE: NOT DETECTED
BACTERIA UR QL AUTO: ABNORMAL /HPF
BASOPHILS # BLD AUTO: 0.05 10*3/MM3 (ref 0–0.2)
BASOPHILS NFR BLD AUTO: 0.3 % (ref 0–1.5)
BILIRUB SERPL-MCNC: 0.5 MG/DL (ref 0–1.2)
BILIRUB UR QL STRIP: ABNORMAL
BUN SERPL-MCNC: 59 MG/DL (ref 6–20)
BUN/CREAT SERPL: 30.9 (ref 7–25)
C CAYETANENSIS DNA STL QL NAA+NON-PROBE: NOT DETECTED
CALCIUM SPEC-SCNC: 9.6 MG/DL (ref 8.6–10.5)
CAMPY SP DNA.DIARRHEA STL QL NAA+PROBE: NOT DETECTED
CHLORIDE SERPL-SCNC: 92 MMOL/L (ref 98–107)
CLARITY UR: ABNORMAL
CO2 SERPL-SCNC: 22 MMOL/L (ref 22–29)
COLOR UR: ABNORMAL
CREAT SERPL-MCNC: 1.91 MG/DL (ref 0.76–1.27)
CRP SERPL-MCNC: 6.41 MG/DL (ref 0–0.5)
CRYPTOSP STL CULT: NOT DETECTED
D-LACTATE SERPL-SCNC: 2.1 MMOL/L (ref 0.5–2)
D-LACTATE SERPL-SCNC: 3.4 MMOL/L (ref 0.5–2)
DEPRECATED RDW RBC AUTO: 46.3 FL (ref 37–54)
E HISTOLYT AG STL-ACNC: NOT DETECTED
EAEC PAA PLAS AGGR+AATA ST NAA+NON-PRB: NOT DETECTED
EC STX1 + STX2 GENES STL NAA+PROBE: NOT DETECTED
EOSINOPHIL # BLD AUTO: 0.02 10*3/MM3 (ref 0–0.4)
EOSINOPHIL NFR BLD AUTO: 0.1 % (ref 0.3–6.2)
EPEC EAE GENE STL QL NAA+NON-PROBE: NOT DETECTED
ERYTHROCYTE [DISTWIDTH] IN BLOOD BY AUTOMATED COUNT: 17.3 % (ref 12.3–15.4)
ERYTHROCYTE [SEDIMENTATION RATE] IN BLOOD: 7 MM/HR (ref 0–15)
ETEC LTA+ST1A+ST1B TOX ST NAA+NON-PROBE: NOT DETECTED
FLUAV RNA RESP QL NAA+PROBE: NOT DETECTED
FLUBV RNA RESP QL NAA+PROBE: NOT DETECTED
G LAMBLIA DNA SPEC QL NAA+PROBE: NOT DETECTED
GFR SERPL CREATININE-BSD FRML MDRD: 39 ML/MIN/1.73
GLOBULIN UR ELPH-MCNC: 5.6 GM/DL
GLUCOSE BLDC GLUCOMTR-MCNC: 186 MG/DL (ref 70–130)
GLUCOSE SERPL-MCNC: 327 MG/DL (ref 65–99)
GLUCOSE UR STRIP-MCNC: NEGATIVE MG/DL
HCT VFR BLD AUTO: 45.1 % (ref 37.5–51)
HGB BLD-MCNC: 13.2 G/DL (ref 13–17.7)
HGB UR QL STRIP.AUTO: NEGATIVE
HOLD SPECIMEN: NORMAL
HOLD SPECIMEN: NORMAL
HYALINE CASTS UR QL AUTO: ABNORMAL /LPF
IMM GRANULOCYTES # BLD AUTO: 0.06 10*3/MM3 (ref 0–0.05)
IMM GRANULOCYTES NFR BLD AUTO: 0.3 % (ref 0–0.5)
KETONES UR QL STRIP: ABNORMAL
LACTATE HOLD SPECIMEN: NORMAL
LEUKOCYTE ESTERASE UR QL STRIP.AUTO: ABNORMAL
LIPASE SERPL-CCNC: 39 U/L (ref 13–60)
LYMPHOCYTES # BLD AUTO: 2.81 10*3/MM3 (ref 0.7–3.1)
LYMPHOCYTES NFR BLD AUTO: 15.7 % (ref 19.6–45.3)
MAGNESIUM SERPL-MCNC: 1.6 MG/DL (ref 1.6–2.6)
MCH RBC QN AUTO: 22.6 PG (ref 26.6–33)
MCHC RBC AUTO-ENTMCNC: 29.3 G/DL (ref 31.5–35.7)
MCV RBC AUTO: 77.2 FL (ref 79–97)
MONOCYTES # BLD AUTO: 0.88 10*3/MM3 (ref 0.1–0.9)
MONOCYTES NFR BLD AUTO: 4.9 % (ref 5–12)
MUCOUS THREADS URNS QL MICRO: ABNORMAL /HPF
NEUTROPHILS NFR BLD AUTO: 14.06 10*3/MM3 (ref 1.7–7)
NEUTROPHILS NFR BLD AUTO: 78.7 % (ref 42.7–76)
NITRITE UR QL STRIP: NEGATIVE
NOROVIRUS GI+II RNA STL QL NAA+NON-PROBE: NOT DETECTED
NRBC BLD AUTO-RTO: 0 /100 WBC (ref 0–0.2)
P SHIGELLOIDES DNA STL QL NAA+PROBE: NOT DETECTED
PH UR STRIP.AUTO: 7.5 [PH] (ref 5–8)
PLATELET # BLD AUTO: 732 10*3/MM3 (ref 140–450)
PMV BLD AUTO: 10.1 FL (ref 6–12)
POTASSIUM SERPL-SCNC: 3.6 MMOL/L (ref 3.5–5.2)
PROT SERPL-MCNC: 9.6 G/DL (ref 6–8.5)
PROT UR QL STRIP: ABNORMAL
RBC # BLD AUTO: 5.84 10*6/MM3 (ref 4.14–5.8)
RBC # UR: ABNORMAL /HPF
REF LAB TEST METHOD: ABNORMAL
RV RNA STL NAA+PROBE: NOT DETECTED
SALMONELLA DNA SPEC QL NAA+PROBE: NOT DETECTED
SAPO I+II+IV+V RNA STL QL NAA+NON-PROBE: NOT DETECTED
SARS-COV-2 RNA RESP QL NAA+PROBE: NOT DETECTED
SHIGELLA SP+EIEC IPAH STL QL NAA+PROBE: NOT DETECTED
SODIUM SERPL-SCNC: 130 MMOL/L (ref 136–145)
SP GR UR STRIP: 1.02 (ref 1–1.03)
SQUAMOUS #/AREA URNS HPF: ABNORMAL /HPF
TROPONIN T SERPL-MCNC: 0.03 NG/ML (ref 0–0.03)
TSH SERPL DL<=0.05 MIU/L-ACNC: 1.16 UIU/ML (ref 0.27–4.2)
UROBILINOGEN UR QL STRIP: ABNORMAL
V CHOLERAE DNA SPEC QL NAA+PROBE: NOT DETECTED
VIBRIO DNA SPEC NAA+PROBE: NOT DETECTED
WBC # BLD AUTO: 17.88 10*3/MM3 (ref 3.4–10.8)
WBC UR QL AUTO: ABNORMAL /HPF
WHOLE BLOOD HOLD SPECIMEN: NORMAL
WHOLE BLOOD HOLD SPECIMEN: NORMAL
YERSINIA STL CULT: NOT DETECTED

## 2021-02-07 PROCEDURE — 86140 C-REACTIVE PROTEIN: CPT | Performed by: PHYSICIAN ASSISTANT

## 2021-02-07 PROCEDURE — 81001 URINALYSIS AUTO W/SCOPE: CPT | Performed by: PHYSICIAN ASSISTANT

## 2021-02-07 PROCEDURE — P9612 CATHETERIZE FOR URINE SPEC: HCPCS

## 2021-02-07 PROCEDURE — 87086 URINE CULTURE/COLONY COUNT: CPT | Performed by: PHYSICIAN ASSISTANT

## 2021-02-07 PROCEDURE — 85652 RBC SED RATE AUTOMATED: CPT | Performed by: PHYSICIAN ASSISTANT

## 2021-02-07 PROCEDURE — 87040 BLOOD CULTURE FOR BACTERIA: CPT | Performed by: PHYSICIAN ASSISTANT

## 2021-02-07 PROCEDURE — 99223 1ST HOSP IP/OBS HIGH 75: CPT | Performed by: NURSE PRACTITIONER

## 2021-02-07 PROCEDURE — 25010000002 VANCOMYCIN: Performed by: PHYSICIAN ASSISTANT

## 2021-02-07 PROCEDURE — 74022 RADEX COMPL AQT ABD SERIES: CPT | Performed by: RADIOLOGY

## 2021-02-07 PROCEDURE — 99284 EMERGENCY DEPT VISIT MOD MDM: CPT

## 2021-02-07 PROCEDURE — 84484 ASSAY OF TROPONIN QUANT: CPT | Performed by: FAMILY MEDICINE

## 2021-02-07 PROCEDURE — 25010000003 MAGNESIUM SULFATE 4 GM/100ML SOLUTION: Performed by: INTERNAL MEDICINE

## 2021-02-07 PROCEDURE — 87077 CULTURE AEROBIC IDENTIFY: CPT | Performed by: PHYSICIAN ASSISTANT

## 2021-02-07 PROCEDURE — 87636 SARSCOV2 & INF A&B AMP PRB: CPT | Performed by: PHYSICIAN ASSISTANT

## 2021-02-07 PROCEDURE — 99285 EMERGENCY DEPT VISIT HI MDM: CPT

## 2021-02-07 PROCEDURE — 74022 RADEX COMPL AQT ABD SERIES: CPT

## 2021-02-07 PROCEDURE — 87186 SC STD MICRODIL/AGAR DIL: CPT | Performed by: PHYSICIAN ASSISTANT

## 2021-02-07 PROCEDURE — 83735 ASSAY OF MAGNESIUM: CPT | Performed by: PHYSICIAN ASSISTANT

## 2021-02-07 PROCEDURE — 82962 GLUCOSE BLOOD TEST: CPT

## 2021-02-07 PROCEDURE — 25010000002 ONDANSETRON PER 1 MG: Performed by: PHYSICIAN ASSISTANT

## 2021-02-07 PROCEDURE — 0097U HC BIOFIRE FILMARRAY GI PANEL: CPT | Performed by: NURSE PRACTITIONER

## 2021-02-07 PROCEDURE — 84443 ASSAY THYROID STIM HORMONE: CPT | Performed by: PHYSICIAN ASSISTANT

## 2021-02-07 PROCEDURE — 80053 COMPREHEN METABOLIC PANEL: CPT | Performed by: PHYSICIAN ASSISTANT

## 2021-02-07 PROCEDURE — 85025 COMPLETE CBC W/AUTO DIFF WBC: CPT | Performed by: PHYSICIAN ASSISTANT

## 2021-02-07 PROCEDURE — 36415 COLL VENOUS BLD VENIPUNCTURE: CPT

## 2021-02-07 PROCEDURE — 93010 ELECTROCARDIOGRAM REPORT: CPT | Performed by: INTERNAL MEDICINE

## 2021-02-07 PROCEDURE — 93005 ELECTROCARDIOGRAM TRACING: CPT | Performed by: PHYSICIAN ASSISTANT

## 2021-02-07 PROCEDURE — 74176 CT ABD & PELVIS W/O CONTRAST: CPT

## 2021-02-07 PROCEDURE — 83605 ASSAY OF LACTIC ACID: CPT | Performed by: PHYSICIAN ASSISTANT

## 2021-02-07 PROCEDURE — 83690 ASSAY OF LIPASE: CPT | Performed by: PHYSICIAN ASSISTANT

## 2021-02-07 RX ORDER — GLIPIZIDE 5 MG/1
5 TABLET ORAL DAILY
COMMUNITY
End: 2021-02-18 | Stop reason: HOSPADM

## 2021-02-07 RX ORDER — SODIUM CHLORIDE 0.9 % (FLUSH) 0.9 %
10 SYRINGE (ML) INJECTION AS NEEDED
Status: DISCONTINUED | OUTPATIENT
Start: 2021-02-07 | End: 2021-02-18 | Stop reason: HOSPADM

## 2021-02-07 RX ORDER — POTASSIUM CHLORIDE 7.45 MG/ML
10 INJECTION INTRAVENOUS
Status: DISCONTINUED | OUTPATIENT
Start: 2021-02-07 | End: 2021-02-18 | Stop reason: HOSPADM

## 2021-02-07 RX ORDER — ONDANSETRON 2 MG/ML
4 INJECTION INTRAMUSCULAR; INTRAVENOUS EVERY 6 HOURS PRN
Status: DISCONTINUED | OUTPATIENT
Start: 2021-02-07 | End: 2021-02-18 | Stop reason: HOSPADM

## 2021-02-07 RX ORDER — MAGNESIUM SULFATE HEPTAHYDRATE 40 MG/ML
2 INJECTION, SOLUTION INTRAVENOUS AS NEEDED
Status: DISCONTINUED | OUTPATIENT
Start: 2021-02-07 | End: 2021-02-18 | Stop reason: HOSPADM

## 2021-02-07 RX ORDER — POTASSIUM CHLORIDE 20 MEQ/1
40 TABLET, EXTENDED RELEASE ORAL EVERY 4 HOURS
Status: COMPLETED | OUTPATIENT
Start: 2021-02-07 | End: 2021-02-08

## 2021-02-07 RX ORDER — SODIUM CHLORIDE 9 MG/ML
100 INJECTION, SOLUTION INTRAVENOUS CONTINUOUS
Status: DISCONTINUED | OUTPATIENT
Start: 2021-02-07 | End: 2021-02-09

## 2021-02-07 RX ORDER — MAGNESIUM SULFATE HEPTAHYDRATE 40 MG/ML
4 INJECTION, SOLUTION INTRAVENOUS ONCE
Status: COMPLETED | OUTPATIENT
Start: 2021-02-07 | End: 2021-02-08

## 2021-02-07 RX ORDER — NITROGLYCERIN 0.4 MG/1
0.4 TABLET SUBLINGUAL
Status: DISCONTINUED | OUTPATIENT
Start: 2021-02-07 | End: 2021-02-18 | Stop reason: HOSPADM

## 2021-02-07 RX ORDER — NICOTINE POLACRILEX 4 MG
15 LOZENGE BUCCAL
Status: DISCONTINUED | OUTPATIENT
Start: 2021-02-07 | End: 2021-02-12 | Stop reason: SDUPTHER

## 2021-02-07 RX ORDER — ONDANSETRON 4 MG/1
4 TABLET, FILM COATED ORAL EVERY 8 HOURS PRN
Status: ON HOLD | COMMUNITY
End: 2022-03-21

## 2021-02-07 RX ORDER — CHOLECALCIFEROL (VITAMIN D3) 125 MCG
1 CAPSULE ORAL DAILY
Status: ON HOLD | COMMUNITY
End: 2022-03-21

## 2021-02-07 RX ORDER — ONDANSETRON 2 MG/ML
4 INJECTION INTRAMUSCULAR; INTRAVENOUS ONCE
Status: COMPLETED | OUTPATIENT
Start: 2021-02-07 | End: 2021-02-07

## 2021-02-07 RX ORDER — FAMOTIDINE 10 MG/ML
20 INJECTION, SOLUTION INTRAVENOUS EVERY 12 HOURS SCHEDULED
Status: DISCONTINUED | OUTPATIENT
Start: 2021-02-07 | End: 2021-02-13 | Stop reason: ALTCHOICE

## 2021-02-07 RX ORDER — DEXTROSE MONOHYDRATE 25 G/50ML
25 INJECTION, SOLUTION INTRAVENOUS
Status: DISCONTINUED | OUTPATIENT
Start: 2021-02-07 | End: 2021-02-18 | Stop reason: HOSPADM

## 2021-02-07 RX ORDER — MAGNESIUM SULFATE HEPTAHYDRATE 40 MG/ML
4 INJECTION, SOLUTION INTRAVENOUS AS NEEDED
Status: DISCONTINUED | OUTPATIENT
Start: 2021-02-07 | End: 2021-02-18 | Stop reason: HOSPADM

## 2021-02-07 RX ORDER — METHENAMINE HIPPURATE 1000 MG/1
1 TABLET ORAL 2 TIMES DAILY
Status: ON HOLD | COMMUNITY
End: 2021-02-18 | Stop reason: SDUPTHER

## 2021-02-07 RX ORDER — ONDANSETRON 4 MG/1
4 TABLET, FILM COATED ORAL EVERY 6 HOURS PRN
Status: DISCONTINUED | OUTPATIENT
Start: 2021-02-07 | End: 2021-02-18 | Stop reason: HOSPADM

## 2021-02-07 RX ORDER — POTASSIUM CHLORIDE 750 MG/1
40 CAPSULE, EXTENDED RELEASE ORAL AS NEEDED
Status: DISCONTINUED | OUTPATIENT
Start: 2021-02-07 | End: 2021-02-18 | Stop reason: HOSPADM

## 2021-02-07 RX ORDER — POTASSIUM CHLORIDE 1.5 G/1.77G
40 POWDER, FOR SOLUTION ORAL AS NEEDED
Status: DISCONTINUED | OUTPATIENT
Start: 2021-02-07 | End: 2021-02-18 | Stop reason: HOSPADM

## 2021-02-07 RX ORDER — SODIUM CHLORIDE 0.9 % (FLUSH) 0.9 %
10 SYRINGE (ML) INJECTION EVERY 12 HOURS SCHEDULED
Status: DISCONTINUED | OUTPATIENT
Start: 2021-02-07 | End: 2021-02-18 | Stop reason: HOSPADM

## 2021-02-07 RX ORDER — OMEPRAZOLE 40 MG/1
40 CAPSULE, DELAYED RELEASE ORAL 2 TIMES DAILY
COMMUNITY
End: 2022-03-24 | Stop reason: HOSPADM

## 2021-02-07 RX ADMIN — SODIUM CHLORIDE, PRESERVATIVE FREE 10 ML: 5 INJECTION INTRAVENOUS at 20:10

## 2021-02-07 RX ADMIN — POTASSIUM CHLORIDE 40 MEQ: 20 TABLET, EXTENDED RELEASE ORAL at 20:07

## 2021-02-07 RX ADMIN — VANCOMYCIN HYDROCHLORIDE 2000 MG: 1 INJECTION, POWDER, LYOPHILIZED, FOR SOLUTION INTRAVENOUS at 18:21

## 2021-02-07 RX ADMIN — SODIUM CHLORIDE 2259 ML: 9 INJECTION, SOLUTION INTRAVENOUS at 20:02

## 2021-02-07 RX ADMIN — SODIUM CHLORIDE 2 G: 900 INJECTION INTRAVENOUS at 16:53

## 2021-02-07 RX ADMIN — SODIUM CHLORIDE 1000 ML: 9 INJECTION, SOLUTION INTRAVENOUS at 15:15

## 2021-02-07 RX ADMIN — MAGNESIUM SULFATE 4 G: 4 INJECTION INTRAVENOUS at 20:41

## 2021-02-07 RX ADMIN — METRONIDAZOLE 500 MG: 500 INJECTION, SOLUTION INTRAVENOUS at 20:13

## 2021-02-07 RX ADMIN — FAMOTIDINE 20 MG: 10 INJECTION INTRAVENOUS at 20:09

## 2021-02-07 RX ADMIN — SODIUM CHLORIDE 100 ML/HR: 9 INJECTION, SOLUTION INTRAVENOUS at 19:58

## 2021-02-07 RX ADMIN — ONDANSETRON 4 MG: 2 INJECTION INTRAMUSCULAR; INTRAVENOUS at 15:17
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
## 2021-02-07 NOTE — ED NOTES
PATIENT CHANGING COLOSTOMY BAG. CONTINUE TO HAVE WATERY BROWN STOOL. DR WHITE DISCUSSED ADMISSION WITH PT. WILL BE BOARDED IN ED DUE TO NO BED AVAILABILITY IN HOSPITAL AT THIS TIME     Felicia Hutchison, SHAHNAZ  02/07/21 [id number]
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
## 2021-02-07 NOTE — ED NOTES
ASSISTING PT FROM WHEELCHAIR TO STRETCHER WITH A SLIDE BOARD.     Felicia Hutchison, RN  02/07/21 [id number]

## 2021-02-07 NOTE — ED NOTES
CONTACTED HOUSE SUPERVISOR TO BRING COLOSTOMY BAG TO EMERGENCY DEPT.  PT'S TORE AWAY FROM BODY DURING TRANSFER TO STRETCHER FROM WHEELCHAIR     Felicia Hutchison RN  02/07/21 [id number]

## 2021-02-08 LAB
ALBUMIN SERPL-MCNC: 3.06 G/DL (ref 3.5–5.2)
ALBUMIN/GLOB SERPL: 0.7 G/DL
ALP SERPL-CCNC: 86 U/L (ref 39–117)
ALT SERPL W P-5'-P-CCNC: 27 U/L (ref 1–41)
ANION GAP SERPL CALCULATED.3IONS-SCNC: 10.7 MMOL/L (ref 5–15)
ANISOCYTOSIS BLD QL: NORMAL
AST SERPL-CCNC: 27 U/L (ref 1–40)
BASOPHILS # BLD AUTO: 0.02 10*3/MM3 (ref 0–0.2)
BASOPHILS NFR BLD AUTO: 0.2 % (ref 0–1.5)
BILIRUB SERPL-MCNC: 0.4 MG/DL (ref 0–1.2)
BUN SERPL-MCNC: 48 MG/DL (ref 6–20)
BUN/CREAT SERPL: 42.5 (ref 7–25)
CALCIUM SPEC-SCNC: 7.8 MG/DL (ref 8.6–10.5)
CHLORIDE SERPL-SCNC: 109 MMOL/L (ref 98–107)
CO2 SERPL-SCNC: 19.3 MMOL/L (ref 22–29)
CREAT SERPL-MCNC: 1.13 MG/DL (ref 0.76–1.27)
CRP SERPL-MCNC: 8.79 MG/DL (ref 0–0.5)
D-LACTATE SERPL-SCNC: 1.7 MMOL/L (ref 0.5–2)
DEPRECATED RDW RBC AUTO: 48.4 FL (ref 37–54)
EOSINOPHIL # BLD AUTO: 0.05 10*3/MM3 (ref 0–0.4)
EOSINOPHIL NFR BLD AUTO: 0.5 % (ref 0.3–6.2)
ERYTHROCYTE [DISTWIDTH] IN BLOOD BY AUTOMATED COUNT: 16.5 % (ref 12.3–15.4)
GFR SERPL CREATININE-BSD FRML MDRD: 71 ML/MIN/1.73
GLOBULIN UR ELPH-MCNC: 4.3 GM/DL
GLUCOSE BLDC GLUCOMTR-MCNC: 140 MG/DL (ref 70–130)
GLUCOSE BLDC GLUCOMTR-MCNC: 158 MG/DL (ref 70–130)
GLUCOSE BLDC GLUCOMTR-MCNC: 174 MG/DL (ref 70–130)
GLUCOSE SERPL-MCNC: 181 MG/DL (ref 65–99)
HCT VFR BLD AUTO: 38.1 % (ref 37.5–51)
HGB BLD-MCNC: 10.5 G/DL (ref 13–17.7)
HYPOCHROMIA BLD QL: NORMAL
IMM GRANULOCYTES # BLD AUTO: 0.02 10*3/MM3 (ref 0–0.05)
IMM GRANULOCYTES NFR BLD AUTO: 0.2 % (ref 0–0.5)
LYMPHOCYTES # BLD AUTO: 1.44 10*3/MM3 (ref 0.7–3.1)
LYMPHOCYTES NFR BLD AUTO: 14.9 % (ref 19.6–45.3)
MAGNESIUM SERPL-MCNC: 2.1 MG/DL (ref 1.6–2.6)
MCH RBC QN AUTO: 22.4 PG (ref 26.6–33)
MCHC RBC AUTO-ENTMCNC: 27.6 G/DL (ref 31.5–35.7)
MCV RBC AUTO: 81.2 FL (ref 79–97)
MONOCYTES # BLD AUTO: 0.61 10*3/MM3 (ref 0.1–0.9)
MONOCYTES NFR BLD AUTO: 6.3 % (ref 5–12)
NEUTROPHILS NFR BLD AUTO: 7.5 10*3/MM3 (ref 1.7–7)
NEUTROPHILS NFR BLD AUTO: 77.9 % (ref 42.7–76)
NRBC BLD AUTO-RTO: 0 /100 WBC (ref 0–0.2)
PHOSPHATE SERPL-MCNC: 3 MG/DL (ref 2.5–4.5)
PLATELET # BLD AUTO: 459 10*3/MM3 (ref 140–450)
PMV BLD AUTO: 9.9 FL (ref 6–12)
POTASSIUM SERPL-SCNC: 4.2 MMOL/L (ref 3.5–5.2)
PROT SERPL-MCNC: 7.4 G/DL (ref 6–8.5)
QT INTERVAL: 344 MS
QTC INTERVAL: 482 MS
RBC # BLD AUTO: 4.69 10*6/MM3 (ref 4.14–5.8)
SMALL PLATELETS BLD QL SMEAR: NORMAL
SODIUM SERPL-SCNC: 139 MMOL/L (ref 136–145)
WBC # BLD AUTO: 9.64 10*3/MM3 (ref 3.4–10.8)

## 2021-02-08 PROCEDURE — 83605 ASSAY OF LACTIC ACID: CPT | Performed by: INTERNAL MEDICINE

## 2021-02-08 PROCEDURE — 80053 COMPREHEN METABOLIC PANEL: CPT | Performed by: INTERNAL MEDICINE

## 2021-02-08 PROCEDURE — 63710000001 INSULIN ASPART PER 5 UNITS: Performed by: INTERNAL MEDICINE

## 2021-02-08 PROCEDURE — 84100 ASSAY OF PHOSPHORUS: CPT | Performed by: INTERNAL MEDICINE

## 2021-02-08 PROCEDURE — 99233 SBSQ HOSP IP/OBS HIGH 50: CPT | Performed by: INTERNAL MEDICINE

## 2021-02-08 PROCEDURE — 86140 C-REACTIVE PROTEIN: CPT | Performed by: INTERNAL MEDICINE

## 2021-02-08 PROCEDURE — 85025 COMPLETE CBC W/AUTO DIFF WBC: CPT | Performed by: INTERNAL MEDICINE

## 2021-02-08 PROCEDURE — 83735 ASSAY OF MAGNESIUM: CPT | Performed by: INTERNAL MEDICINE

## 2021-02-08 PROCEDURE — 99222 1ST HOSP IP/OBS MODERATE 55: CPT | Performed by: SURGERY

## 2021-02-08 PROCEDURE — 25010000002 VANCOMYCIN 5 G RECONSTITUTED SOLUTION: Performed by: INTERNAL MEDICINE

## 2021-02-08 PROCEDURE — 82962 GLUCOSE BLOOD TEST: CPT

## 2021-02-08 PROCEDURE — 85007 BL SMEAR W/DIFF WBC COUNT: CPT | Performed by: INTERNAL MEDICINE

## 2021-02-08 RX ORDER — ARIPIPRAZOLE 10 MG/1
10 TABLET ORAL NIGHTLY
Status: CANCELLED | OUTPATIENT
Start: 2021-02-08

## 2021-02-08 RX ORDER — GLIPIZIDE 5 MG/1
5 TABLET ORAL DAILY
Status: CANCELLED | OUTPATIENT
Start: 2021-02-08

## 2021-02-08 RX ORDER — PANTOPRAZOLE SODIUM 40 MG/1
40 TABLET, DELAYED RELEASE ORAL EVERY MORNING
Status: CANCELLED | OUTPATIENT
Start: 2021-02-08

## 2021-02-08 RX ORDER — OMEGA-3S/DHA/EPA/FISH OIL/D3 300MG-1000
2000 CAPSULE ORAL DAILY
Status: CANCELLED | OUTPATIENT
Start: 2021-02-08

## 2021-02-08 RX ORDER — DULOXETIN HYDROCHLORIDE 60 MG/1
60 CAPSULE, DELAYED RELEASE ORAL 2 TIMES DAILY
Status: CANCELLED | OUTPATIENT
Start: 2021-02-08

## 2021-02-08 RX ORDER — GABAPENTIN 400 MG/1
800 CAPSULE ORAL 3 TIMES DAILY
Status: CANCELLED | OUTPATIENT
Start: 2021-02-08

## 2021-02-08 RX ORDER — ATORVASTATIN CALCIUM 10 MG/1
10 TABLET, FILM COATED ORAL NIGHTLY
Status: CANCELLED | OUTPATIENT
Start: 2021-02-08

## 2021-02-08 RX ORDER — ALBUTEROL SULFATE 90 UG/1
2 AEROSOL, METERED RESPIRATORY (INHALATION) EVERY 4 HOURS PRN
Status: CANCELLED | OUTPATIENT
Start: 2021-02-08

## 2021-02-08 RX ORDER — METHENAMINE HIPPURATE 1000 MG/1
1 TABLET ORAL 2 TIMES DAILY
Status: CANCELLED | OUTPATIENT
Start: 2021-02-08

## 2021-02-08 RX ORDER — SODIUM HYPOCHLORITE 1.25 MG/ML
SOLUTION TOPICAL 2 TIMES DAILY
Status: DISCONTINUED | OUTPATIENT
Start: 2021-02-08 | End: 2021-02-18 | Stop reason: HOSPADM

## 2021-02-08 RX ORDER — L.ACID,PARA/B.BIFIDUM/S.THERM 8B CELL
1 CAPSULE ORAL DAILY
Status: CANCELLED | OUTPATIENT
Start: 2021-02-08

## 2021-02-08 RX ORDER — BACLOFEN 10 MG/1
30 TABLET ORAL
Status: CANCELLED | OUTPATIENT
Start: 2021-02-08

## 2021-02-08 RX ORDER — MODAFINIL 200 MG/1
200 TABLET ORAL DAILY
Status: CANCELLED | OUTPATIENT
Start: 2021-02-08

## 2021-02-08 RX ORDER — ONDANSETRON 4 MG/1
4 TABLET, FILM COATED ORAL DAILY PRN
Status: CANCELLED | OUTPATIENT
Start: 2021-02-08

## 2021-02-08 RX ADMIN — INSULIN ASPART 2 UNITS: 100 INJECTION, SOLUTION INTRAVENOUS; SUBCUTANEOUS at 11:06

## 2021-02-08 RX ADMIN — METRONIDAZOLE 500 MG: 500 INJECTION, SOLUTION INTRAVENOUS at 02:41

## 2021-02-08 RX ADMIN — METRONIDAZOLE 500 MG: 500 INJECTION, SOLUTION INTRAVENOUS at 11:53

## 2021-02-08 RX ADMIN — POTASSIUM CHLORIDE 40 MEQ: 20 TABLET, EXTENDED RELEASE ORAL at 00:01

## 2021-02-08 RX ADMIN — SODIUM HYPOCHLORITE: 1.25 SOLUTION TOPICAL at 22:37

## 2021-02-08 RX ADMIN — SODIUM CHLORIDE 2 G: 900 INJECTION INTRAVENOUS at 22:37

## 2021-02-08 RX ADMIN — SODIUM CHLORIDE, PRESERVATIVE FREE 10 ML: 5 INJECTION INTRAVENOUS at 09:27

## 2021-02-08 RX ADMIN — FAMOTIDINE 20 MG: 10 INJECTION INTRAVENOUS at 21:10

## 2021-02-08 RX ADMIN — VANCOMYCIN HYDROCHLORIDE 1250 MG: 5 INJECTION, POWDER, LYOPHILIZED, FOR SOLUTION INTRAVENOUS at 11:53

## 2021-02-08 RX ADMIN — METRONIDAZOLE 500 MG: 500 INJECTION, SOLUTION INTRAVENOUS at 21:11

## 2021-02-08 RX ADMIN — Medication: at 21:17

## 2021-02-08 RX ADMIN — Medication: at 17:12

## 2021-02-08 RX ADMIN — SODIUM CHLORIDE 100 ML/HR: 9 INJECTION, SOLUTION INTRAVENOUS at 23:54

## 2021-02-08 RX ADMIN — SODIUM CHLORIDE 100 ML/HR: 9 INJECTION, SOLUTION INTRAVENOUS at 11:08

## 2021-02-08 RX ADMIN — SODIUM CHLORIDE, PRESERVATIVE FREE 10 ML: 5 INJECTION INTRAVENOUS at 21:10

## 2021-02-08 RX ADMIN — INSULIN ASPART 2 UNITS: 100 INJECTION, SOLUTION INTRAVENOUS; SUBCUTANEOUS at 07:07

## 2021-02-08 RX ADMIN — FAMOTIDINE 20 MG: 10 INJECTION INTRAVENOUS at 09:16

## 2021-02-08 NOTE — NURSING NOTE
Patient with chronic stage 4 PI's to coccyx, right glute, and left glute.  He has used a NPWT VAC in the past but states this was discontinued when he developed a foul odor to the wounds and was started on damp to dry packing.  Wound beds are clean, davina wounds are red but intact and blanchable.  He is noted to have some maceration and MASD to his perineum.  See attached flow sheet documentation.  Treatment ordered.       02/08/21 1539   Wound 02/08/21 1537 coccyx Pressure Injury   Placement Date/Time: 02/08/21 1537   Present on Hospital Admission: Yes  Location: coccyx  Primary Wound Type: Pressure Injury   Dressing Appearance moist drainage   Base moist;red   Periwound intact;redness;blanchable   Periwound Temperature warm   Periwound Skin Turgor soft   Edges open   Wound Length (cm) 1.5 cm   Wound Width (cm) 1.2 cm   Wound Depth (cm) 2.6 cm   Drainage Characteristics/Odor serosanguineous   Drainage Amount moderate   Care, Wound cleansed with;sterile normal saline   Dressing Care dressing changed;packed with;packing strip  (Dampened packing strip with NS)   Wound 02/08/21 1538 Right gluteal Pressure Injury   Placement Date/Time: 02/08/21 1538   Present on Hospital Admission: Yes  Side: Right  Location: gluteal  Primary Wound Type: Pressure Injury  Stage, Pressure Injury : Stage 4   Dressing Appearance moist drainage   Base red;moist   Periwound intact;redness;blanchable   Periwound Temperature warm   Periwound Skin Turgor soft   Edges open   Wound Length (cm) 4.8 cm   Wound Width (cm) 3.2 cm   Wound Depth (cm) 3.8 cm   Drainage Amount moderate   Care, Wound cleansed with;sterile normal saline   Dressing Care dressing changed;packed with;other (see comments)  (Packed with NS dampened Kerlix)   Wound 02/08/21 1539 Left gluteal Pressure Injury   Placement Date/Time: 02/08/21 1539   Present on Hospital Admission: Yes  Side: Left  Location: gluteal  Primary Wound Type: Pressure Injury  Stage, Pressure Injury : Stage 4    Dressing Appearance no drainage   Base moist;red   Periwound intact;redness;blanchable   Periwound Temperature warm   Periwound Skin Turgor soft   Edges open   Wound Length (cm) 5.5 cm   Wound Width (cm) 2 cm   Wound Depth (cm) 4.2 cm   Drainage Characteristics/Odor serosanguineous   Drainage Amount moderate   Care, Wound cleansed with;sterile normal saline   Dressing Care dressing changed;packed with;other (see comments)  (Packed with NS dampened Kerlix)

## 2021-02-08 NOTE — PLAN OF CARE
Goal Outcome Evaluation:  Plan of Care Reviewed With: patient  Progress: no change  Outcome Summary: Received patient from the ER. Vital signs WNL, RA. Patient with colostomy and urostomy bag. Estella CHRISTIE from wound care has been down to see patient and has done dressing changes on patient pressure injuries and wounds. Patient is encouraged to turn every two hours and we will assiste him. No complaints at this time. Call light within reach. Will continue to monitor patient progress.   No complaints. Increased insulin to 0+4+4+10N. Pt to get NST on Monday and IOL on Tuesday night. Reviewed IOL with her. Reviewed kick counts.

## 2021-02-08 NOTE — PROGRESS NOTES
[facility] HOSPITALIST PROGRESS NOTE     Patient Identification:  Name:  Ken Krueger  Age:  45 y.o.  Sex:  male  :  1975  MRN:  [id number]  Visit Number:  [id number]  ROOM: [address]     Primary Care Provider:  Provider, No Known    Length of stay in inpatient status:  1    Subjective     Chief Compliant:    Chief Complaint   Patient presents with   • Vomiting   • Nausea   • Diarrhea       History of Presenting Illness:    Patient reports feeling much better. Denies any new problems. Has started to have liquid stool out his ostomy. Is making urine.     ROS:  Otherwise 10 point ROS negative other than documented above in HPI.     Objective     Current Hospital Meds:aztreonam, 2 g, Intravenous, Q8H  famotidine, 20 mg, Intravenous, Q12H  insulin aspart, 0-9 Units, Subcutaneous, TID AC  magic barrier cream, , Topical, 4x Daily  metroNIDAZOLE, 500 mg, Intravenous, Q8H  sodium chloride, 10 mL, Intravenous, Q12H  sodium hypochlorite, , Topical, BID    sodium chloride, 100 mL/hr, Last Rate: 100 mL/hr (21 1108)        Current Antimicrobial Therapy:  Anti-Infectives (From admission, onward)    Ordered     Dose/Rate Route Frequency Start Stop    21 1753  aztreonam (AZACTAM) 2 g/100 mL 0.9% NS (mbp)     Ordering Provider: Javier Moscoso DO    2 g  over 4 Hours Intravenous Every 8 Hours 21 2300 02/15/21 2259    21 1827  metroNIDAZOLE (FLAGYL) 500 mg/100mL IVPB     Sneha Harrell, PharmD reviewed the order on 21 1511.   Ordering Provider: Javier Moscoso DO    500 mg Intravenous Every 8 Hours 21 1900 21 1859    21 1555  vancomycin 2000 mg/500 mL 0.9% NS IVPB ([facility])     Ordering Provider: Bryce Farrar PA    2,000 mg  over 120 Minutes Intravenous Once 21 1630 21 2021    21 1547  aztreonam (AZACTAM) 2 g/100 mL 0.9% NS (mbp)     Ordering Provider: Charan, Bryce A, PA    2 g  over 30 Minutes Intravenous Once 21 1549  02/07/21 1800        Current Diuretic Therapy:  Diuretics (From admission, onward)    None        ----------------------------------------------------------------------------------------------------------------------  Vital Signs:  Temp:  [97.9 °F (36.6 °C)-98.7 °F (37.1 °C)] 98.3 °F (36.8 °C)  Heart Rate:  [] 96  Resp:  [22-24] 22  BP: ()/(40-85) 128/73  SpO2:  [88 %-100 %] 99 %  on   ;   Device (Oxygen Therapy): room air  Body mass index is 46.03 kg/m².    Wt Readings from Last 3 Encounters:   02/07/21 (!) 150 kg (330 lb)   06/02/20 (!) 150 kg (330 lb)   05/04/20 (!) 150 kg (330 lb)     Intake & Output (last 3 days)       02/05 0701 - 02/06 0700 02/06 0701 - 02/07 0700 02/07 0701 - 02/08 0700 02/08 0701 - 02/09 0700    I.V. (mL/kg)   100 (0.7)     IV Piggyback   5059 250    Total Intake(mL/kg)   5159 (34.4) 250 (1.7)    Urine (mL/kg/hr)    1000 (0.6)    Stool    1050    Total Output    2050    Net   +1705 -9030                Diet Clear Liquid  ----------------------------------------------------------------------------------------------------------------------  Physical exam:  Constitutional:  Well-developed and well-nourished.  No respiratory distress.      HENT:  Head:  Normocephalic and atraumatic.  Mouth:  Moist mucous membranes.    Eyes:  Conjunctivae and EOM are normal. No scleral icterus.    Neck:  Neck supple.  No JVD present.    Cardiovascular:  Normal rate, regular rhythm and normal heart sounds with no murmur.  Pulmonary/Chest:  No respiratory distress, no wheezes, no crackles, with normal breath sounds and good air movement.  Abdominal:  Soft.  Bowel sounds are normal.  No distension and no tenderness. Liquid stool in ostomy.   Musculoskeletal:  L AKA, right leg atrophied   Neurological:  Alert and oriented to person, place, and time.  No cranial nerve deficit.  No tongue deviation.  No facial droop.  No slurred speech.   Skin:  Skin is warm and dry. No rash noted. No pallor.   Peripheral  vascular:  Pulses in all 4 extremities with no clubbing, no cyanosis, no edema.  ----------------------------------------------------------------------------------------------------------------------  Tele:    ----------------------------------------------------------------------------------------------------------------------  Results from last 7 days   Lab Units 02/08/21 0903 02/08/21 0902 02/07/21 1952 02/07/21  1356   CRP mg/dL  --  8.79*  --  6.41*   LACTATE mmol/L 1.7  --  2.1* 3.4*   WBC 10*3/mm3 9.64  --   --  17.88*   HEMOGLOBIN g/dL 10.5*  --   --  13.2   HEMATOCRIT % 38.1  --   --  45.1   MCV fL 81.2  --   --  77.2*   MCHC g/dL 27.6*  --   --  29.3*   PLATELETS 10*3/mm3 459*  --   --  732*         Results from last 7 days   Lab Units 02/08/21 0902 02/07/21  1356   SODIUM mmol/L 139 130*   POTASSIUM mmol/L 4.2 3.6   MAGNESIUM mg/dL 2.1 1.6   CHLORIDE mmol/L 109* 92*   CO2 mmol/L 19.3* 22.0   BUN mg/dL 48* 59*   CREATININE mg/dL 1.13 1.91*   EGFR IF NONAFRICN AM mL/min/1.73 71 39*   CALCIUM mg/dL 7.8* 9.6   PHOSPHORUS mg/dL 3.0  --    GLUCOSE mg/dL 181* 327*   ALBUMIN g/dL 3.06* 4.02   BILIRUBIN mg/dL 0.4 0.5   ALK PHOS U/L 86 114   AST (SGOT) U/L 27 24   ALT (SGPT) U/L 27 40   Estimated Creatinine Clearance: 125.2 mL/min (by C-G formula based on SCr of 1.13 mg/dL).  No results found for: AMMONIA  Results from last 7 days   Lab Units 02/07/21  1356   TROPONIN T ng/mL 0.029             Glucose   Date/Time Value Ref Range Status   02/08/2021 1102 158 (H) 70 - 130 mg/dL Final   02/08/2021 0041 174 (H) 70 - 130 mg/dL Final   02/07/2021 2056 186 (H) 70 - 130 mg/dL Final     Lab Results   Component Value Date    TSH 1.160 02/07/2021     No results found for: PREGTESTUR, PREGSERUM, HCG, HCGQUANT  Pain Management Panel     Pain Management Panel Latest Ref Rng & Units 8/19/2018 12/5/2017    AMPHETAMINES SCREEN, URINE Negative Negative Negative    BARBITURATES SCREEN Negative Negative Negative    BENZODIAZEPINE  SCREEN, URINE Negative Negative Negative    BUPRENORPHINEUR Negative Negative Negative    COCAINE SCREEN, URINE Negative Negative Negative    METHADONE SCREEN, URINE Negative Negative Negative        Brief Urine Lab Results  (Last result in the past 365 days)      Color   Clarity   Blood   Leuk Est   Nitrite   Protein   CREAT   Urine HCG        02/07/21 1826 Dark Yellow Cloudy Negative Large (3+) Negative 100 mg/dL (2+)             Blood Culture   Date Value Ref Range Status   02/07/2021 No growth at 24 hours  Preliminary   02/07/2021 No growth at 24 hours  Preliminary     Urine Culture   Date Value Ref Range Status   02/07/2021 >100,000 CFU/mL Gram Negative Bacilli (A)  Preliminary     No results found for: WOUNDCX  No results found for: STOOLCX  No results found for: RESPCX  No results found for: AFBCX  Results from last 7 days   Lab Units 02/08/21  0903 02/08/21  0902 02/07/21 1952 02/07/21  1356   LACTATE mmol/L 1.7  --  2.1* 3.4*   SED RATE mm/hr  --   --   --  7   CRP mg/dL  --  8.79*  --  6.41*       I have personally looked at the labs and they are summarized above.  ----------------------------------------------------------------------------------------------------------------------  Detailed radiology reports for the last 24 hours:    Imaging Results (Last 24 Hours)     ** No results found for the last 24 hours. **        Assessment & Plan    #Septic shock 2/2 UTI  - Not on pressors this AM  - Urine culture growing gram negative bacilli. Will continue to monitor.  - GI PCR negative.   - Continue azactam and stop vancomycin.     #Ileus vs. Possible small bowel obstruction   - CT the abdomen and pelvis without contrast showed multiple dilated loops of bowel, no significant free fluid, possibly representing potential small bowel stricture versus ileus  - Patient now passing liquid stool in osotomy   - Surgery consulted. Appreciate recs. CLD started.     #JAKE  - Prerenal vs. ATN from sepsis. CT did not reveal  evidence of obstructive uropathy.   - Cr has improved 1.91=>1.13. Continue maintenance fluids.     #Paraplegia 2/2 MA  #Multiple decubitus ulcers  - Wound care consulted. Ostomy care. Supportive care.     #Hypokalemia  - Continue to replace as per protocol     #DM II  - SSI    #ARLIN on CPAP     F: PO  E: Replace as needed   N: Advance to CLD    Code status: Full     Dispo: Pending clinical improvement     VTE Prophylaxis:   Mechanical Order History:     None      Pharmalogical Order History:     None          Brannon Adrian MD  [facility] Hospitalist  02/08/21  17:27 EST
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
## 2021-02-08 NOTE — ED NOTES
Urosotomy wafer noted to be loose at site and sediment build up noted in tubing. 1200ml urine output emptied. Urostomy site cleaned, new wafer and drainage bag applied. Wounds to bilateral btx, and coccyx changed at this time.       Marilu García RN  02/08/21 0325

## 2021-02-08 NOTE — PROGRESS NOTES
Pharmacokinetics Service Note:    Mr. Krueger continues on day 2 of 7 of vancomycin 1.25 gm q18hrs for his sepsis due to suspected UTI.  Improving JAKE noted with an estimated normal ClCr today using the serum Cr of 1.13 mg/dL.  Will shorten the dosage interval to q12hrs to target an AUC range of 400-600 mg/L.hr.  Will continue to follow his serum Cr trend and obtain a level in 1-2 days to determine if any adjustments are needed.    Thank you.  Sneha Harrell, Pharm.D.  2/8/2021  15:14 EST

## 2021-02-08 NOTE — PROGRESS NOTES
Discharge Planning Assessment   Fabricio     Patient Name: Ken Krueger  MRN: [id number]  Today's Date: 2/8/2021    Admit Date: 2/7/2021    Discharge Needs Assessment     Row Name 02/08/21 1219       Living Environment    Lives With  sibling(s)    Name(s) of Who Lives With Patient  Pt lives at home with his brother, Pineda.    Current Living Arrangements  home/apartment/condo    Primary Care Provided by  self    Provides Primary Care For  no one    Family Caregiver if Needed  sibling(s)    Quality of Family Relationships  involved;helpful;supportive    Able to Return to Prior Arrangements  yes       Resource/Environmental Concerns    Transportation Concerns  car, none       Transition Planning    Patient/Family Anticipates Transition to  home with family    Transportation Anticipated  family or friend will provide       Discharge Needs Assessment    Equipment Currently Used at Home  hospital bed;wheelchair, motorized    Equipment Needed After Discharge  none        Discharge Plan     Row Name 02/08/21 1222       Plan    Plan  SS spoke with pt's brother, Pineda, today. Pt lives at home with Pineda. Pt receives [facility]. [facility] to be contacted at discharge [phone]. Pt has a hoapital bed and electric wheelchair. Pt's brother, Pineda, is his POA and he does not have a Living will. Pt's PCP is Franchesca Hodges through [facility]. Pt plans to return home at discharge, with transportation provided by his brother. SS will follow and assist as needed.    Patient/Family in Agreement with Plan  yes            Demographic Summary     Row Name 02/08/21 121       General Information    Admission Type  inpatient    Referral Source  nursing    Reason for Consult  discharge planning    Preferred Language  English     Used During This Interaction  no              HUMPHREY Middleton

## 2021-02-09 LAB
ALBUMIN SERPL-MCNC: 2.88 G/DL (ref 3.5–5.2)
ALBUMIN/GLOB SERPL: 0.7 G/DL
ALP SERPL-CCNC: 79 U/L (ref 39–117)
ALT SERPL W P-5'-P-CCNC: 22 U/L (ref 1–41)
ANION GAP SERPL CALCULATED.3IONS-SCNC: 10.8 MMOL/L (ref 5–15)
ANISOCYTOSIS BLD QL: NORMAL
AST SERPL-CCNC: 25 U/L (ref 1–40)
BACTERIA SPEC AEROBE CULT: ABNORMAL
BASOPHILS # BLD AUTO: 0.03 10*3/MM3 (ref 0–0.2)
BASOPHILS NFR BLD AUTO: 0.4 % (ref 0–1.5)
BILIRUB SERPL-MCNC: 0.3 MG/DL (ref 0–1.2)
BUN SERPL-MCNC: 22 MG/DL (ref 6–20)
BUN/CREAT SERPL: 33.3 (ref 7–25)
CALCIUM SPEC-SCNC: 8 MG/DL (ref 8.6–10.5)
CHLORIDE SERPL-SCNC: 107 MMOL/L (ref 98–107)
CO2 SERPL-SCNC: 21.2 MMOL/L (ref 22–29)
CREAT SERPL-MCNC: 0.66 MG/DL (ref 0.76–1.27)
DEPRECATED RDW RBC AUTO: 47.8 FL (ref 37–54)
EOSINOPHIL # BLD AUTO: 0.13 10*3/MM3 (ref 0–0.4)
EOSINOPHIL NFR BLD AUTO: 1.8 % (ref 0.3–6.2)
ERYTHROCYTE [DISTWIDTH] IN BLOOD BY AUTOMATED COUNT: 16.4 % (ref 12.3–15.4)
GFR SERPL CREATININE-BSD FRML MDRD: 132 ML/MIN/1.73
GLOBULIN UR ELPH-MCNC: 4 GM/DL
GLUCOSE BLDC GLUCOMTR-MCNC: 160 MG/DL (ref 70–130)
GLUCOSE BLDC GLUCOMTR-MCNC: 181 MG/DL (ref 70–130)
GLUCOSE BLDC GLUCOMTR-MCNC: 188 MG/DL (ref 70–130)
GLUCOSE BLDC GLUCOMTR-MCNC: 202 MG/DL (ref 70–130)
GLUCOSE BLDC GLUCOMTR-MCNC: 240 MG/DL (ref 70–130)
GLUCOSE SERPL-MCNC: 163 MG/DL (ref 65–99)
HCT VFR BLD AUTO: 35.2 % (ref 37.5–51)
HGB BLD-MCNC: 9.9 G/DL (ref 13–17.7)
HYPOCHROMIA BLD QL: NORMAL
IMM GRANULOCYTES # BLD AUTO: 0.02 10*3/MM3 (ref 0–0.05)
IMM GRANULOCYTES NFR BLD AUTO: 0.3 % (ref 0–0.5)
LYMPHOCYTES # BLD AUTO: 2.21 10*3/MM3 (ref 0.7–3.1)
LYMPHOCYTES NFR BLD AUTO: 30.7 % (ref 19.6–45.3)
MCH RBC QN AUTO: 22.4 PG (ref 26.6–33)
MCHC RBC AUTO-ENTMCNC: 28.1 G/DL (ref 31.5–35.7)
MCV RBC AUTO: 79.8 FL (ref 79–97)
MONOCYTES # BLD AUTO: 0.41 10*3/MM3 (ref 0.1–0.9)
MONOCYTES NFR BLD AUTO: 5.7 % (ref 5–12)
NEUTROPHILS NFR BLD AUTO: 4.39 10*3/MM3 (ref 1.7–7)
NEUTROPHILS NFR BLD AUTO: 61.1 % (ref 42.7–76)
NRBC BLD AUTO-RTO: 0 /100 WBC (ref 0–0.2)
PLATELET # BLD AUTO: 409 10*3/MM3 (ref 140–450)
PMV BLD AUTO: 10.4 FL (ref 6–12)
POTASSIUM SERPL-SCNC: 3.2 MMOL/L (ref 3.5–5.2)
POTASSIUM SERPL-SCNC: 3.9 MMOL/L (ref 3.5–5.2)
PROT SERPL-MCNC: 6.9 G/DL (ref 6–8.5)
RBC # BLD AUTO: 4.41 10*6/MM3 (ref 4.14–5.8)
SMALL PLATELETS BLD QL SMEAR: ADEQUATE
SODIUM SERPL-SCNC: 139 MMOL/L (ref 136–145)
WBC # BLD AUTO: 7.19 10*3/MM3 (ref 3.4–10.8)

## 2021-02-09 PROCEDURE — 85025 COMPLETE CBC W/AUTO DIFF WBC: CPT | Performed by: INTERNAL MEDICINE

## 2021-02-09 PROCEDURE — 94799 UNLISTED PULMONARY SVC/PX: CPT

## 2021-02-09 PROCEDURE — 99231 SBSQ HOSP IP/OBS SF/LOW 25: CPT | Performed by: SURGERY

## 2021-02-09 PROCEDURE — 85007 BL SMEAR W/DIFF WBC COUNT: CPT | Performed by: INTERNAL MEDICINE

## 2021-02-09 PROCEDURE — 25010000003 POTASSIUM CHLORIDE 10 MEQ/100ML SOLUTION: Performed by: INTERNAL MEDICINE

## 2021-02-09 PROCEDURE — 82962 GLUCOSE BLOOD TEST: CPT

## 2021-02-09 PROCEDURE — 99233 SBSQ HOSP IP/OBS HIGH 50: CPT | Performed by: INTERNAL MEDICINE

## 2021-02-09 PROCEDURE — 63710000001 INSULIN ASPART PER 5 UNITS: Performed by: INTERNAL MEDICINE

## 2021-02-09 PROCEDURE — 80053 COMPREHEN METABOLIC PANEL: CPT | Performed by: INTERNAL MEDICINE

## 2021-02-09 PROCEDURE — 84132 ASSAY OF SERUM POTASSIUM: CPT | Performed by: INTERNAL MEDICINE

## 2021-02-09 RX ORDER — OMEGA-3S/DHA/EPA/FISH OIL/D3 300MG-1000
2000 CAPSULE ORAL DAILY
Status: DISCONTINUED | OUTPATIENT
Start: 2021-02-09 | End: 2021-02-18 | Stop reason: HOSPADM

## 2021-02-09 RX ORDER — BACLOFEN 10 MG/1
30 TABLET ORAL
Status: DISCONTINUED | OUTPATIENT
Start: 2021-02-09 | End: 2021-02-18 | Stop reason: HOSPADM

## 2021-02-09 RX ORDER — ARIPIPRAZOLE 10 MG/1
10 TABLET ORAL NIGHTLY
Status: DISCONTINUED | OUTPATIENT
Start: 2021-02-09 | End: 2021-02-18 | Stop reason: HOSPADM

## 2021-02-09 RX ORDER — MODAFINIL 100 MG/1
200 TABLET ORAL DAILY
Status: CANCELLED | OUTPATIENT
Start: 2021-02-09

## 2021-02-09 RX ORDER — DULOXETIN HYDROCHLORIDE 60 MG/1
60 CAPSULE, DELAYED RELEASE ORAL 2 TIMES DAILY
Status: DISCONTINUED | OUTPATIENT
Start: 2021-02-09 | End: 2021-02-18 | Stop reason: HOSPADM

## 2021-02-09 RX ORDER — L.ACID,PARA/B.BIFIDUM/S.THERM 8B CELL
1 CAPSULE ORAL DAILY
Status: DISCONTINUED | OUTPATIENT
Start: 2021-02-09 | End: 2021-02-18 | Stop reason: HOSPADM

## 2021-02-09 RX ORDER — ATORVASTATIN CALCIUM 10 MG/1
10 TABLET, FILM COATED ORAL NIGHTLY
Status: DISCONTINUED | OUTPATIENT
Start: 2021-02-09 | End: 2021-02-18 | Stop reason: HOSPADM

## 2021-02-09 RX ORDER — GABAPENTIN 400 MG/1
400 CAPSULE ORAL EVERY 8 HOURS SCHEDULED
Status: DISCONTINUED | OUTPATIENT
Start: 2021-02-09 | End: 2021-02-18 | Stop reason: HOSPADM

## 2021-02-09 RX ORDER — ALBUTEROL SULFATE 2.5 MG/3ML
2.5 SOLUTION RESPIRATORY (INHALATION) EVERY 4 HOURS PRN
Status: DISCONTINUED | OUTPATIENT
Start: 2021-02-09 | End: 2021-02-18 | Stop reason: HOSPADM

## 2021-02-09 RX ORDER — POTASSIUM CHLORIDE 7.45 MG/ML
10 INJECTION INTRAVENOUS
Status: COMPLETED | OUTPATIENT
Start: 2021-02-09 | End: 2021-02-09

## 2021-02-09 RX ADMIN — POTASSIUM CHLORIDE 10 MEQ: 7.46 INJECTION, SOLUTION INTRAVENOUS at 09:30

## 2021-02-09 RX ADMIN — SODIUM CHLORIDE, PRESERVATIVE FREE 10 ML: 5 INJECTION INTRAVENOUS at 09:30

## 2021-02-09 RX ADMIN — POTASSIUM CHLORIDE 10 MEQ: 7.46 INJECTION, SOLUTION INTRAVENOUS at 11:26

## 2021-02-09 RX ADMIN — FAMOTIDINE 20 MG: 10 INJECTION INTRAVENOUS at 20:46

## 2021-02-09 RX ADMIN — FAMOTIDINE 20 MG: 10 INJECTION INTRAVENOUS at 08:58

## 2021-02-09 RX ADMIN — CHOLECALCIFEROL TAB 10 MCG (400 UNIT) 2000 UNITS: 10 TAB at 09:02

## 2021-02-09 RX ADMIN — METRONIDAZOLE 500 MG: 500 INJECTION, SOLUTION INTRAVENOUS at 20:46

## 2021-02-09 RX ADMIN — DULOXETINE HYDROCHLORIDE 60 MG: 60 CAPSULE, DELAYED RELEASE ORAL at 09:02

## 2021-02-09 RX ADMIN — INSULIN ASPART 4 UNITS: 100 INJECTION, SOLUTION INTRAVENOUS; SUBCUTANEOUS at 11:26

## 2021-02-09 RX ADMIN — ATORVASTATIN CALCIUM 10 MG: 10 TABLET, FILM COATED ORAL at 20:46

## 2021-02-09 RX ADMIN — SODIUM HYPOCHLORITE: 1.25 SOLUTION TOPICAL at 20:47

## 2021-02-09 RX ADMIN — SODIUM HYPOCHLORITE: 1.25 SOLUTION TOPICAL at 09:00

## 2021-02-09 RX ADMIN — INSULIN ASPART 2 UNITS: 100 INJECTION, SOLUTION INTRAVENOUS; SUBCUTANEOUS at 08:57

## 2021-02-09 RX ADMIN — Medication: at 09:00

## 2021-02-09 RX ADMIN — METRONIDAZOLE 500 MG: 500 INJECTION, SOLUTION INTRAVENOUS at 02:56

## 2021-02-09 RX ADMIN — APIXABAN 5 MG: 5 TABLET, FILM COATED ORAL at 20:45

## 2021-02-09 RX ADMIN — Medication 1 CAPSULE: at 09:02

## 2021-02-09 RX ADMIN — ARIPIPRAZOLE 10 MG: 10 TABLET ORAL at 20:45

## 2021-02-09 RX ADMIN — SODIUM CHLORIDE, PRESERVATIVE FREE 10 ML: 5 INJECTION INTRAVENOUS at 20:46

## 2021-02-09 RX ADMIN — GABAPENTIN 400 MG: 400 CAPSULE ORAL at 08:58

## 2021-02-09 RX ADMIN — Medication: at 11:26

## 2021-02-09 RX ADMIN — INSULIN ASPART 2 UNITS: 100 INJECTION, SOLUTION INTRAVENOUS; SUBCUTANEOUS at 17:09

## 2021-02-09 RX ADMIN — GABAPENTIN 400 MG: 400 CAPSULE ORAL at 21:57

## 2021-02-09 RX ADMIN — DULOXETINE HYDROCHLORIDE 60 MG: 60 CAPSULE, DELAYED RELEASE ORAL at 20:46

## 2021-02-09 RX ADMIN — POTASSIUM CHLORIDE 10 MEQ: 7.46 INJECTION, SOLUTION INTRAVENOUS at 11:27

## 2021-02-09 RX ADMIN — METRONIDAZOLE 500 MG: 500 INJECTION, SOLUTION INTRAVENOUS at 11:27

## 2021-02-09 RX ADMIN — BACLOFEN 30 MG: 10 TABLET ORAL at 17:09

## 2021-02-09 RX ADMIN — BACLOFEN 30 MG: 10 TABLET ORAL at 21:57

## 2021-02-09 RX ADMIN — BACLOFEN 30 MG: 10 TABLET ORAL at 13:40

## 2021-02-09 RX ADMIN — Medication: at 20:47

## 2021-02-09 RX ADMIN — SODIUM CHLORIDE 2 G: 900 INJECTION INTRAVENOUS at 14:32

## 2021-02-09 RX ADMIN — SODIUM CHLORIDE 2 G: 900 INJECTION INTRAVENOUS at 06:06

## 2021-02-09 RX ADMIN — Medication: at 20:46

## 2021-02-09 RX ADMIN — POTASSIUM CHLORIDE 10 MEQ: 7.46 INJECTION, SOLUTION INTRAVENOUS at 08:58

## 2021-02-09 RX ADMIN — APIXABAN 5 MG: 5 TABLET, FILM COATED ORAL at 09:02

## 2021-02-09 NOTE — PROGRESS NOTES
[facility] HOSPITALIST PROGRESS NOTE     Patient Identification:  Name:  Ken Krueger  Age:  45 y.o.  Sex:  male  :  1975  MRN:  [id number]  Visit Number:  [id number]  ROOM: [address]     Primary Care Provider:  Provider, No Known    Length of stay in inpatient status:  2    Subjective     Chief Compliant:    Chief Complaint   Patient presents with   • Vomiting   • Nausea   • Diarrhea       History of Presenting Illness:    Patient reports he continues to feel better. No new complaints. NO events overnight. Tolerating diet well.     ROS:  Otherwise 10 point ROS negative other than documented above in HPI.     Objective     Current Hospital Meds:apixaban, 5 mg, Oral, Q12H  ARIPiprazole, 10 mg, Oral, Nightly  atorvastatin, 10 mg, Oral, Nightly  aztreonam, 2 g, Intravenous, Q8H  baclofen, 30 mg, Oral, 4x Daily With Meals & Nightly  cholecalciferol, 2,000 Units, Oral, Daily  DULoxetine, 60 mg, Oral, BID  famotidine, 20 mg, Intravenous, Q12H  gabapentin, 400 mg, Oral, Q8H  insulin aspart, 0-9 Units, Subcutaneous, TID AC  lactobacillus acidophilus, 1 capsule, Oral, Daily  magic barrier cream, , Topical, 4x Daily  metroNIDAZOLE, 500 mg, Intravenous, Q8H  sodium chloride, 10 mL, Intravenous, Q12H  sodium hypochlorite, , Topical, BID    sodium chloride, 100 mL/hr, Last Rate: 100 mL/hr (21 2404)        Current Antimicrobial Therapy:  Anti-Infectives (From admission, onward)    Ordered     Dose/Rate Route Frequency Start Stop    21 1753  aztreonam (AZACTAM) 2 g/100 mL 0.9% NS (mbp)     Ordering Provider: Javier Moscoso DO    2 g  over 4 Hours Intravenous Every 8 Hours 21 2300 02/15/21 2259    21 1827  metroNIDAZOLE (FLAGYL) 500 mg/100mL IVPB     Sneha Harrell, PharmD reviewed the order on 21 1511.   Ordering Provider: Javier Moscoso DO    500 mg Intravenous Every 8 Hours 21 1900 21 1859    21 1555  vancomycin 2000 mg/500 mL 0.9% NS IVPB  ([facility])     Ordering Provider: Bryce Farrar PA    2,000 mg  over 120 Minutes Intravenous Once 02/07/21 1630 02/07/21 2021 02/07/21 1547  aztreonam (AZACTAM) 2 g/100 mL 0.9% NS (mbp)     Ordering Provider: Bryce Farrar PA    2 g  over 30 Minutes Intravenous Once 02/07/21 1549 02/07/21 1800        Current Diuretic Therapy:  Diuretics (From admission, onward)    None        ----------------------------------------------------------------------------------------------------------------------  Vital Signs:  Temp:  [97.8 °F (36.6 °C)-98.3 °F (36.8 °C)] 98.3 °F (36.8 °C)  Heart Rate:  [] 85  Resp:  [12-21] 20  BP: (110-147)/(61-94) 129/72  SpO2:  [82 %-100 %] 94 %  on  Flow (L/min):  [2] 2;   Device (Oxygen Therapy): nasal cannula  Body mass index is 38.95 kg/m².    Wt Readings from Last 3 Encounters:   02/09/21 127 kg (279 lb 4.8 oz)   06/02/20 (!) 150 kg (330 lb)   05/04/20 (!) 150 kg (330 lb)     Intake & Output (last 3 days)       02/06 0701 - 02/07 0700 02/07 0701 - 02/08 0700 02/08 0701 - 02/09 0700 02/09 0701 - 02/10 0700    P.O.   320 360    I.V. (mL/kg)  100 (0.7) 1329.5 (10.5)     IV Piggyback  5059 250     Total Intake(mL/kg)  5159 (34.4) 1899.5 (15) 360 (2.8)    Urine (mL/kg/hr)   4100 (1.3) 1100 (0.8)    Stool   1650 100    Total Output   5750 1200    Net  +5159 -3850.5 -840                Diet Regular; Consistent Carbohydrate  ----------------------------------------------------------------------------------------------------------------------  Physical exam:  Constitutional:  Well-developed and well-nourished.  No respiratory distress.      HENT:  Head:  Normocephalic and atraumatic.  Mouth:  Moist mucous membranes.    Eyes:  Conjunctivae and EOM are normal. No scleral icterus.    Neck:  Neck supple.  No JVD present.    Cardiovascular:  Normal rate, regular rhythm and normal heart sounds with no murmur.  Pulmonary/Chest:  No respiratory distress, no wheezes, no crackles, with normal breath  sounds and good air movement.  Abdominal:  Soft.  Bowel sounds are normal.  No distension and no tenderness.     Neurological:  Alert and oriented to person, place, and time.  No cranial nerve deficit.  No tongue deviation.  No facial droop.  No slurred speech.   Skin:  Skin is warm and dry. No rash noted. No pallor.   Peripheral vascular:  Pulses in all 4 extremities with no clubbing, no cyanosis, no edema.  ----------------------------------------------------------------------------------------------------------------------  Tele:    ----------------------------------------------------------------------------------------------------------------------  Results from last 7 days   Lab Units 02/09/21  0352 02/08/21  0903 02/08/21  0902 02/07/21  1952 02/07/21  1356   CRP mg/dL  --   --  8.79*  --  6.41*   LACTATE mmol/L  --  1.7  --  2.1* 3.4*   WBC 10*3/mm3 7.19 9.64  --   --  17.88*   HEMOGLOBIN g/dL 9.9* 10.5*  --   --  13.2   HEMATOCRIT % 35.2* 38.1  --   --  45.1   MCV fL 79.8 81.2  --   --  77.2*   MCHC g/dL 28.1* 27.6*  --   --  29.3*   PLATELETS 10*3/mm3 409 459*  --   --  732*         Results from last 7 days   Lab Units 02/09/21  1711 02/09/21  0352 02/08/21  0902 02/07/21  1356   SODIUM mmol/L  --  139 139 130*   POTASSIUM mmol/L 3.9 3.2* 4.2 3.6   MAGNESIUM mg/dL  --   --  2.1 1.6   CHLORIDE mmol/L  --  107 109* 92*   CO2 mmol/L  --  21.2* 19.3* 22.0   BUN mg/dL  --  22* 48* 59*   CREATININE mg/dL  --  0.66* 1.13 1.91*   EGFR IF NONAFRICN AM mL/min/1.73  --  132 71 39*   CALCIUM mg/dL  --  8.0* 7.8* 9.6   PHOSPHORUS mg/dL  --   --  3.0  --    GLUCOSE mg/dL  --  163* 181* 327*   ALBUMIN g/dL  --  2.88* 3.06* 4.02   BILIRUBIN mg/dL  --  0.3 0.4 0.5   ALK PHOS U/L  --  79 86 114   AST (SGOT) U/L  --  25 27 24   ALT (SGPT) U/L  --  22 27 40   Estimated Creatinine Clearance: 196 mL/min (A) (by C-G formula based on SCr of 0.66 mg/dL (L)).  No results found for: AMMONIA  Results from last 7 days   Lab Units  02/07/21  1356   TROPONIN T ng/mL 0.029             Glucose   Date/Time Value Ref Range Status   02/09/2021 1637 181 (H) 70 - 130 mg/dL Final   02/09/2021 1101 202 (H) 70 - 130 mg/dL Final   02/09/2021 0606 188 (H) 70 - 130 mg/dL Final   02/08/2021 2353 160 (H) 70 - 130 mg/dL Final   02/08/2021 1717 140 (H) 70 - 130 mg/dL Final   02/08/2021 1102 158 (H) 70 - 130 mg/dL Final   02/08/2021 0041 174 (H) 70 - 130 mg/dL Final   02/07/2021 2056 186 (H) 70 - 130 mg/dL Final     Lab Results   Component Value Date    TSH 1.160 02/07/2021     No results found for: PREGTESTUR, PREGSERUM, HCG, HCGQUANT  Pain Management Panel     Pain Management Panel Latest Ref Rng & Units 8/19/2018 12/5/2017    AMPHETAMINES SCREEN, URINE Negative Negative Negative    BARBITURATES SCREEN Negative Negative Negative    BENZODIAZEPINE SCREEN, URINE Negative Negative Negative    BUPRENORPHINEUR Negative Negative Negative    COCAINE SCREEN, URINE Negative Negative Negative    METHADONE SCREEN, URINE Negative Negative Negative        Brief Urine Lab Results  (Last result in the past 365 days)      Color   Clarity   Blood   Leuk Est   Nitrite   Protein   CREAT   Urine HCG        02/07/21 1826 Dark Yellow Cloudy Negative Large (3+) Negative 100 mg/dL (2+)             Blood Culture   Date Value Ref Range Status   02/07/2021 No growth at 2 days  Preliminary   02/07/2021 No growth at 2 days  Preliminary     Urine Culture   Date Value Ref Range Status   02/07/2021 (A)  Final    >100,000 CFU/mL Klebsiella pneumoniae ssp pneumoniae     No results found for: WOUNDCX  No results found for: STOOLCX  No results found for: RESPCX  No results found for: AFBCX  Results from last 7 days   Lab Units 02/08/21  0903 02/08/21  0902 02/07/21 1952 02/07/21  1356   LACTATE mmol/L 1.7  --  2.1* 3.4*   SED RATE mm/hr  --   --   --  7   CRP mg/dL  --  8.79*  --  6.41*       I have personally looked at the labs and they are summarized  above.  ----------------------------------------------------------------------------------------------------------------------  Detailed radiology reports for the last 24 hours:    Imaging Results (Last 24 Hours)     ** No results found for the last 24 hours. **        Assessment & Plan    #Septic shock 2/2 UTI  - Urine culture growing Klebsiella pneumonia. Sensitivities reviewed. Given significant kephlex allergy, will consult ID for antimicrobial recommendations.   - GI PCR negative.   - Continue azactam for now. Vanc discontinued on 2/8.     #Ileus vs. Possible small bowel obstruction   - CT the abdomen and pelvis without contrast showed multiple dilated loops of bowel, no significant free fluid, possibly representing potential small bowel stricture versus ileus  - Patient now passing liquid stool in osotomy   - Surgery consulted. Appreciate recs. Diet advanced.     #JAKE  - Prerenal vs. ATN from sepsis. CT did not reveal evidence of obstructive uropathy.   - Cr has improved 1.91=>1.13=>0.66. Will stop fluids.     #Paraplegia 2/2 MA  #Multiple decubitus ulcers  - Wound care consulted. Ostomy care. Supportive care.     #Hypokalemia  - Continue to replace as per protocol     #DM II  - SSI    #ARLIN on CPAP     F: PO  E: Replace as needed   N: Advance to CLD    Code status: Full     Dispo: Pending clinical improvement       VTE Prophylaxis:   Mechanical Order History:     None      Pharmalogical Order History:      Ordered     Dose Route Frequency Stop    02/09/21 0747  apixaban (ELIQUIS) tablet 5 mg      5 mg PO Every 12 Hours Scheduled --                Brannon Adrian MD  [facility]  02/09/21  17:59 EST

## 2021-02-09 NOTE — PLAN OF CARE
Goal Outcome Evaluation:  Plan of Care Reviewed With: patient  Progress: improving  Outcome Summary: VSS. RA. NSR. Pt advanced to regular diet with no issues with that. Patient was transfer to a [place] Bed today. Has slept most of the day today. No complaints voiced. Abx continued.

## 2021-02-09 NOTE — PLAN OF CARE
Goal Outcome Evaluation:     Progress: no change  Outcome Summary: Pt. alert and oriented. VSS and remains on room air. NS continues @ 100mL/hr. Pt remains on a clear liquid diet and tolerating well. Wound care completed this shift. Pt. turned every 2 hours. Pt. denies any complaints at this time. Call light within reach. Will continue to monitor this shift.

## 2021-02-09 NOTE — PROGRESS NOTES
Diarrhea    He has tolerated clear liquids and has no nausea or vomiting. Loose bowel movements are the same. afeb and vss. Abdomen soft and ostomies ok. Advance diet. His gastroenteritis is improved.

## 2021-02-10 ENCOUNTER — APPOINTMENT (OUTPATIENT)
Dept: WOUND CARE | Facility: HOSPITAL | Age: 46
End: 2021-02-10

## 2021-02-10 LAB
ANION GAP SERPL CALCULATED.3IONS-SCNC: 10.7 MMOL/L (ref 5–15)
ANISOCYTOSIS BLD QL: NORMAL
BASOPHILS # BLD AUTO: 0.03 10*3/MM3 (ref 0–0.2)
BASOPHILS NFR BLD AUTO: 0.4 % (ref 0–1.5)
BUN SERPL-MCNC: 15 MG/DL (ref 6–20)
BUN/CREAT SERPL: 18.3 (ref 7–25)
CALCIUM SPEC-SCNC: 9 MG/DL (ref 8.6–10.5)
CHLORIDE SERPL-SCNC: 104 MMOL/L (ref 98–107)
CO2 SERPL-SCNC: 22.3 MMOL/L (ref 22–29)
CREAT SERPL-MCNC: 0.82 MG/DL (ref 0.76–1.27)
CRP SERPL-MCNC: 5.83 MG/DL (ref 0–0.5)
DEPRECATED RDW RBC AUTO: 48.7 FL (ref 37–54)
EOSINOPHIL # BLD AUTO: 0.17 10*3/MM3 (ref 0–0.4)
EOSINOPHIL NFR BLD AUTO: 2.1 % (ref 0.3–6.2)
ERYTHROCYTE [DISTWIDTH] IN BLOOD BY AUTOMATED COUNT: 16.5 % (ref 12.3–15.4)
GFR SERPL CREATININE-BSD FRML MDRD: 103 ML/MIN/1.73
GLUCOSE BLDC GLUCOMTR-MCNC: 192 MG/DL (ref 70–130)
GLUCOSE BLDC GLUCOMTR-MCNC: 202 MG/DL (ref 70–130)
GLUCOSE BLDC GLUCOMTR-MCNC: 205 MG/DL (ref 70–130)
GLUCOSE BLDC GLUCOMTR-MCNC: 220 MG/DL (ref 70–130)
GLUCOSE BLDC GLUCOMTR-MCNC: 247 MG/DL (ref 70–130)
GLUCOSE SERPL-MCNC: 211 MG/DL (ref 65–99)
HCT VFR BLD AUTO: 38.1 % (ref 37.5–51)
HGB BLD-MCNC: 10.3 G/DL (ref 13–17.7)
HYPOCHROMIA BLD QL: NORMAL
IMM GRANULOCYTES # BLD AUTO: 0.05 10*3/MM3 (ref 0–0.05)
IMM GRANULOCYTES NFR BLD AUTO: 0.6 % (ref 0–0.5)
LYMPHOCYTES # BLD AUTO: 2.04 10*3/MM3 (ref 0.7–3.1)
LYMPHOCYTES NFR BLD AUTO: 25.3 % (ref 19.6–45.3)
MCH RBC QN AUTO: 22.1 PG (ref 26.6–33)
MCHC RBC AUTO-ENTMCNC: 27 G/DL (ref 31.5–35.7)
MCV RBC AUTO: 81.6 FL (ref 79–97)
MONOCYTES # BLD AUTO: 0.41 10*3/MM3 (ref 0.1–0.9)
MONOCYTES NFR BLD AUTO: 5.1 % (ref 5–12)
NEUTROPHILS NFR BLD AUTO: 5.36 10*3/MM3 (ref 1.7–7)
NEUTROPHILS NFR BLD AUTO: 66.5 % (ref 42.7–76)
NRBC BLD AUTO-RTO: 0 /100 WBC (ref 0–0.2)
PLAT MORPH BLD: NORMAL
PLATELET # BLD AUTO: 404 10*3/MM3 (ref 140–450)
PMV BLD AUTO: 10 FL (ref 6–12)
POTASSIUM SERPL-SCNC: 3.5 MMOL/L (ref 3.5–5.2)
POTASSIUM SERPL-SCNC: 4 MMOL/L (ref 3.5–5.2)
RBC # BLD AUTO: 4.67 10*6/MM3 (ref 4.14–5.8)
SODIUM SERPL-SCNC: 137 MMOL/L (ref 136–145)
WBC # BLD AUTO: 8.06 10*3/MM3 (ref 3.4–10.8)

## 2021-02-10 PROCEDURE — 99232 SBSQ HOSP IP/OBS MODERATE 35: CPT | Performed by: INTERNAL MEDICINE

## 2021-02-10 PROCEDURE — 86140 C-REACTIVE PROTEIN: CPT | Performed by: NURSE PRACTITIONER

## 2021-02-10 PROCEDURE — 94799 UNLISTED PULMONARY SVC/PX: CPT

## 2021-02-10 PROCEDURE — 80048 BASIC METABOLIC PNL TOTAL CA: CPT | Performed by: INTERNAL MEDICINE

## 2021-02-10 PROCEDURE — 85007 BL SMEAR W/DIFF WBC COUNT: CPT | Performed by: INTERNAL MEDICINE

## 2021-02-10 PROCEDURE — 25010000002 CEFTRIAXONE PER 250 MG: Performed by: INTERNAL MEDICINE

## 2021-02-10 PROCEDURE — 99231 SBSQ HOSP IP/OBS SF/LOW 25: CPT | Performed by: SURGERY

## 2021-02-10 PROCEDURE — 85025 COMPLETE CBC W/AUTO DIFF WBC: CPT | Performed by: INTERNAL MEDICINE

## 2021-02-10 PROCEDURE — 82962 GLUCOSE BLOOD TEST: CPT

## 2021-02-10 PROCEDURE — 84132 ASSAY OF SERUM POTASSIUM: CPT | Performed by: INTERNAL MEDICINE

## 2021-02-10 PROCEDURE — 63710000001 INSULIN ASPART PER 5 UNITS: Performed by: INTERNAL MEDICINE

## 2021-02-10 RX ORDER — POTASSIUM CHLORIDE 20 MEQ/1
40 TABLET, EXTENDED RELEASE ORAL EVERY 4 HOURS
Status: COMPLETED | OUTPATIENT
Start: 2021-02-10 | End: 2021-02-10

## 2021-02-10 RX ADMIN — GABAPENTIN 400 MG: 400 CAPSULE ORAL at 13:07

## 2021-02-10 RX ADMIN — SODIUM CHLORIDE 2 G: 900 INJECTION INTRAVENOUS at 06:33

## 2021-02-10 RX ADMIN — ARIPIPRAZOLE 10 MG: 10 TABLET ORAL at 20:47

## 2021-02-10 RX ADMIN — FAMOTIDINE 20 MG: 10 INJECTION INTRAVENOUS at 09:07

## 2021-02-10 RX ADMIN — POTASSIUM CHLORIDE 40 MEQ: 20 TABLET, EXTENDED RELEASE ORAL at 13:08

## 2021-02-10 RX ADMIN — BACLOFEN 30 MG: 10 TABLET ORAL at 13:08

## 2021-02-10 RX ADMIN — METRONIDAZOLE 500 MG: 500 INJECTION, SOLUTION INTRAVENOUS at 03:03

## 2021-02-10 RX ADMIN — SODIUM CHLORIDE 2 G: 900 INJECTION INTRAVENOUS at 00:00

## 2021-02-10 RX ADMIN — GABAPENTIN 400 MG: 400 CAPSULE ORAL at 21:48

## 2021-02-10 RX ADMIN — INSULIN ASPART 4 UNITS: 100 INJECTION, SOLUTION INTRAVENOUS; SUBCUTANEOUS at 17:49

## 2021-02-10 RX ADMIN — APIXABAN 5 MG: 5 TABLET, FILM COATED ORAL at 20:47

## 2021-02-10 RX ADMIN — Medication 1 APPLICATION: at 17:49

## 2021-02-10 RX ADMIN — FAMOTIDINE 20 MG: 10 INJECTION INTRAVENOUS at 20:47

## 2021-02-10 RX ADMIN — CEFTRIAXONE 2 G: 2 INJECTION, POWDER, FOR SOLUTION INTRAMUSCULAR; INTRAVENOUS at 13:08

## 2021-02-10 RX ADMIN — CHOLECALCIFEROL TAB 10 MCG (400 UNIT) 2000 UNITS: 10 TAB at 09:07

## 2021-02-10 RX ADMIN — INSULIN ASPART 2 UNITS: 100 INJECTION, SOLUTION INTRAVENOUS; SUBCUTANEOUS at 09:07

## 2021-02-10 RX ADMIN — SODIUM HYPOCHLORITE 1 ML: 1.25 SOLUTION TOPICAL at 09:10

## 2021-02-10 RX ADMIN — POTASSIUM CHLORIDE 40 MEQ: 20 TABLET, EXTENDED RELEASE ORAL at 09:08

## 2021-02-10 RX ADMIN — BACLOFEN 30 MG: 10 TABLET ORAL at 09:08

## 2021-02-10 RX ADMIN — SODIUM CHLORIDE, PRESERVATIVE FREE 10 ML: 5 INJECTION INTRAVENOUS at 20:47

## 2021-02-10 RX ADMIN — Medication: at 09:09

## 2021-02-10 RX ADMIN — SODIUM CHLORIDE, PRESERVATIVE FREE 10 ML: 5 INJECTION INTRAVENOUS at 09:08

## 2021-02-10 RX ADMIN — DULOXETINE HYDROCHLORIDE 60 MG: 60 CAPSULE, DELAYED RELEASE ORAL at 20:47

## 2021-02-10 RX ADMIN — Medication 1 CAPSULE: at 09:07

## 2021-02-10 RX ADMIN — DULOXETINE HYDROCHLORIDE 60 MG: 60 CAPSULE, DELAYED RELEASE ORAL at 09:07

## 2021-02-10 RX ADMIN — ATORVASTATIN CALCIUM 10 MG: 10 TABLET, FILM COATED ORAL at 20:47

## 2021-02-10 RX ADMIN — BACLOFEN 30 MG: 10 TABLET ORAL at 20:47

## 2021-02-10 RX ADMIN — Medication: at 20:48

## 2021-02-10 RX ADMIN — BACLOFEN 30 MG: 10 TABLET ORAL at 17:48

## 2021-02-10 RX ADMIN — INSULIN ASPART 4 UNITS: 100 INJECTION, SOLUTION INTRAVENOUS; SUBCUTANEOUS at 13:07

## 2021-02-10 RX ADMIN — SODIUM HYPOCHLORITE: 1.25 SOLUTION TOPICAL at 20:48

## 2021-02-10 RX ADMIN — GABAPENTIN 400 MG: 400 CAPSULE ORAL at 06:33

## 2021-02-10 RX ADMIN — APIXABAN 5 MG: 5 TABLET, FILM COATED ORAL at 09:08

## 2021-02-10 NOTE — PLAN OF CARE
Problem: Adult Inpatient Plan of Care  Goal: Plan of Care Review  Outcome: Ongoing, Progressing  Flowsheets (Taken 2/10/2021 0311)  Progress: improving  Plan of Care Reviewed With: patient  Outcome Summary: PT resting in bed at this time. PT continues to tolerate diet. PT remains on RA. PT has had no complaints of pain/discomfort throughout shift. Wound Care Provided. Fall Precautions maintained. VSS. Afebrile.  Goal: Patient-Specific Goal (Individualized)  Outcome: Ongoing, Progressing  Goal: Absence of Hospital-Acquired Illness or Injury  Outcome: Ongoing, Progressing  Intervention: Identify and Manage Fall Risk  Recent Flowsheet Documentation  Taken 2/10/2021 0300 by Tish Allen, RN  Safety Promotion/Fall Prevention:   activity supervised   assistive device/personal items within reach   clutter free environment maintained   fall prevention program maintained   nonskid shoes/slippers when out of bed   room organization consistent   safety round/check completed  Taken 2/10/2021 0200 by Tish Allen, RN  Safety Promotion/Fall Prevention:   activity supervised   assistive device/personal items within reach   clutter free environment maintained   fall prevention program maintained   nonskid shoes/slippers when out of bed   room organization consistent   safety round/check completed  Taken 2/10/2021 0100 by Tish Allen, RN  Safety Promotion/Fall Prevention:   activity supervised   assistive device/personal items within reach   clutter free environment maintained   fall prevention program maintained   nonskid shoes/slippers when out of bed   room organization consistent   safety round/check completed  Taken 2/9/2021 2300 by Tish Allen, RN  Safety Promotion/Fall Prevention:   activity supervised   assistive device/personal items within reach   clutter free environment maintained   fall prevention program maintained   nonskid shoes/slippers when out of bed   room organization consistent   safety  round/check completed  Taken 2/9/2021 2100 by Tish Allen RN  Safety Promotion/Fall Prevention:   activity supervised   assistive device/personal items within reach   clutter free environment maintained   fall prevention program maintained   nonskid shoes/slippers when out of bed   room organization consistent   safety round/check completed  Taken 2/9/2021 2045 by Tish Allen RN  Safety Promotion/Fall Prevention:   activity supervised   assistive device/personal items within reach   clutter free environment maintained   fall prevention program maintained   nonskid shoes/slippers when out of bed   room organization consistent   safety round/check completed  Taken 2/9/2021 1900 by Tish Allen RN  Safety Promotion/Fall Prevention:   activity supervised   assistive device/personal items within reach   clutter free environment maintained   fall prevention program maintained   nonskid shoes/slippers when out of bed   safety round/check completed   room organization consistent  Intervention: Prevent Skin Injury  Recent Flowsheet Documentation  Taken 2/9/2021 2045 by Tish Allen RN  Body Position: side-lying, right  Intervention: Prevent and Manage VTE (venous thromboembolism) Risk  Recent Flowsheet Documentation  Taken 2/10/2021 0200 by Tish Allen RN  VTE Prevention/Management: (See Mar) other (see comments)  Taken 2/9/2021 2045 by Tish Allen RN  VTE Prevention/Management: (See Mar) other (see comments)  Intervention: Prevent Infection  Recent Flowsheet Documentation  Taken 2/10/2021 0300 by Tish Allen RN  Infection Prevention: rest/sleep promoted  Taken 2/10/2021 0200 by Tish Allen RN  Infection Prevention: rest/sleep promoted  Taken 2/10/2021 0100 by Tish Allen RN  Infection Prevention: rest/sleep promoted  Taken 2/9/2021 2300 by Tish Allen RN  Infection Prevention: rest/sleep promoted  Taken 2/9/2021 2100 by Tish Allen RN  Infection Prevention: rest/sleep  promoted  Taken 2/9/2021 2045 by Tish Allen RN  Infection Prevention: rest/sleep promoted  Taken 2/9/2021 1900 by Tish Allen RN  Infection Prevention: rest/sleep promoted  Goal: Optimal Comfort and Wellbeing  Outcome: Ongoing, Progressing  Intervention: Provide Person-Centered Care  Recent Flowsheet Documentation  Taken 2/10/2021 0200 by Tish Allen RN  Trust Relationship/Rapport:   care explained   choices provided   empathic listening provided   thoughts/feelings acknowledged  Taken 2/9/2021 2045 by Tish Allen RN  Trust Relationship/Rapport:   care explained   choices provided   empathic listening provided   thoughts/feelings acknowledged  Goal: Readiness for Transition of Care  Outcome: Ongoing, Progressing     Problem: Skin Injury Risk Increased  Goal: Skin Health and Integrity  Outcome: Ongoing, Progressing  Intervention: Optimize Skin Protection  Recent Flowsheet Documentation  Taken 2/10/2021 0200 by Tish Allen RN  Pressure Reduction Techniques: weight shift assistance provided  Pressure Reduction Devices: pressure-redistributing mattress utilized  Skin Protection:   adhesive use limited   skin-to-device areas padded   incontinence pads utilized  Taken 2/9/2021 2045 by Tish Allen RN  Pressure Reduction Techniques: weight shift assistance provided  Head of Bed (HOB): HOB at 30-45 degrees  Pressure Reduction Devices: pressure-redistributing mattress utilized  Skin Protection:   adhesive use limited   skin-to-device areas padded  Intervention: Promote and Optimize Oral Intake  Recent Flowsheet Documentation  Taken 2/10/2021 0200 by Tish Allen, RN  Oral Nutrition Promotion: rest periods promoted  Taken 2/9/2021 2045 by Tish Allen RN  Oral Nutrition Promotion: rest periods promoted     Problem: Fall Injury Risk  Goal: Absence of Fall and Fall-Related Injury  Outcome: Ongoing, Progressing  Intervention: Identify and Manage Contributors to Fall Injury Risk  Recent  Flowsheet Documentation  Taken 2/10/2021 0300 by Tish Allen, RN  Medication Review/Management: medications reviewed  Taken 2/10/2021 0200 by Tish Allen RN  Medication Review/Management: medications reviewed  Self-Care Promotion:   BADL personal objects within reach   BADL personal routines maintained  Taken 2/10/2021 0100 by Tish Allen, RN  Medication Review/Management: medications reviewed  Taken 2/9/2021 2300 by Tish Allen, RN  Medication Review/Management: medications reviewed  Taken 2/9/2021 2100 by Tish Allen, RN  Medication Review/Management: medications reviewed  Taken 2/9/2021 2045 by Tish Allen, RN  Medication Review/Management: medications reviewed  Self-Care Promotion:   BADL personal objects within reach   BADL personal routines maintained   independence encouraged  Taken 2/9/2021 1900 by Tish Allen, RN  Medication Review/Management: medications reviewed  Intervention: Promote Injury-Free Environment  Recent Flowsheet Documentation  Taken 2/10/2021 0300 by Tish Allen, RN  Safety Promotion/Fall Prevention:   activity supervised   assistive device/personal items within reach   clutter free environment maintained   fall prevention program maintained   nonskid shoes/slippers when out of bed   room organization consistent   safety round/check completed  Taken 2/10/2021 0200 by Tish Allen, RN  Safety Promotion/Fall Prevention:   activity supervised   assistive device/personal items within reach   clutter free environment maintained   fall prevention program maintained   nonskid shoes/slippers when out of bed   room organization consistent   safety round/check completed  Taken 2/10/2021 0100 by Tish Allen, RN  Safety Promotion/Fall Prevention:   activity supervised   assistive device/personal items within reach   clutter free environment maintained   fall prevention program maintained   nonskid shoes/slippers when out of bed   room organization consistent    safety round/check completed  Taken 2/9/2021 2300 by Tish Allen RN  Safety Promotion/Fall Prevention:   activity supervised   assistive device/personal items within reach   clutter free environment maintained   fall prevention program maintained   nonskid shoes/slippers when out of bed   room organization consistent   safety round/check completed  Taken 2/9/2021 2100 by Tish Allen, RN  Safety Promotion/Fall Prevention:   activity supervised   assistive device/personal items within reach   clutter free environment maintained   fall prevention program maintained   nonskid shoes/slippers when out of bed   room organization consistent   safety round/check completed  Taken 2/9/2021 2045 by Tish Allen, RN  Safety Promotion/Fall Prevention:   activity supervised   assistive device/personal items within reach   clutter free environment maintained   fall prevention program maintained   nonskid shoes/slippers when out of bed   room organization consistent   safety round/check completed  Taken 2/9/2021 1900 by Tish Allen, RN  Safety Promotion/Fall Prevention:   activity supervised   assistive device/personal items within reach   clutter free environment maintained   fall prevention program maintained   nonskid shoes/slippers when out of bed   safety round/check completed   room organization consistent     Problem: Asthma Comorbidity  Goal: Maintenance of Asthma Control  Outcome: Ongoing, Progressing  Intervention: Maintain Asthma Symptom Control  Recent Flowsheet Documentation  Taken 2/10/2021 0300 by Tish Allen, RN  Medication Review/Management: medications reviewed  Taken 2/10/2021 0200 by Tish Allen, RN  Medication Review/Management: medications reviewed  Taken 2/10/2021 0100 by Tish Allen, RN  Medication Review/Management: medications reviewed  Taken 2/9/2021 2300 by Tish Allen, RN  Medication Review/Management: medications reviewed  Taken 2/9/2021 2100 by Tish Allen  RN  Medication Review/Management: medications reviewed  Taken 2/9/2021 2045 by Tish Allen RN  Medication Review/Management: medications reviewed  Taken 2/9/2021 1900 by Tish Allen RN  Medication Review/Management: medications reviewed     Problem: COPD Comorbidity  Goal: Maintenance of COPD Symptom Control  Outcome: Ongoing, Progressing  Intervention: Maintain COPD-Symptom Control  Recent Flowsheet Documentation  Taken 2/10/2021 0300 by Tish Allen RN  Medication Review/Management: medications reviewed  Taken 2/10/2021 0200 by Tish Allen RN  Medication Review/Management: medications reviewed  Taken 2/10/2021 0100 by Tish Allen RN  Medication Review/Management: medications reviewed  Taken 2/9/2021 2300 by Tish Allen RN  Medication Review/Management: medications reviewed  Taken 2/9/2021 2100 by Tish Allen RN  Medication Review/Management: medications reviewed  Taken 2/9/2021 2045 by Tish Allen RN  Medication Review/Management: medications reviewed  Taken 2/9/2021 1900 by Tish Allen RN  Medication Review/Management: medications reviewed     Problem: Diabetes Comorbidity  Goal: Blood Glucose Level Within Desired Range  Outcome: Ongoing, Progressing  Intervention: Maintain Glycemic Control  Recent Flowsheet Documentation  Taken 2/10/2021 0200 by Tish Allen RN  Glycemic Management:   blood glucose monitoring   oral hydration promoted  Taken 2/9/2021 2045 by Tish Allen RN  Glycemic Management:   blood glucose monitoring   oral hydration promoted     Problem: Heart Failure Comorbidity  Goal: Maintenance of Heart Failure Symptom Control  Outcome: Ongoing, Progressing  Intervention: Maintain Heart Failure-Management Strategies  Recent Flowsheet Documentation  Taken 2/10/2021 0300 by Tish Allen RN  Medication Review/Management: medications reviewed  Taken 2/10/2021 0200 by Tish Allen RN  Medication Review/Management: medications reviewed  Taken  2/10/2021 0100 by Tish Allen RN  Medication Review/Management: medications reviewed  Taken 2/9/2021 2300 by Tish Allen RN  Medication Review/Management: medications reviewed  Taken 2/9/2021 2100 by Tish Allen RN  Medication Review/Management: medications reviewed  Taken 2/9/2021 2045 by Tish Allen RN  Medication Review/Management: medications reviewed  Taken 2/9/2021 1900 by Tish Allen RN  Medication Review/Management: medications reviewed     Problem: Hypertension Comorbidity  Goal: Blood Pressure in Desired Range  Outcome: Ongoing, Progressing  Intervention: Maintain Hypertension-Management Strategies  Recent Flowsheet Documentation  Taken 2/10/2021 0300 by Tish Allen RN  Medication Review/Management: medications reviewed  Taken 2/10/2021 0200 by Tish Allen RN  Medication Review/Management: medications reviewed  Taken 2/10/2021 0100 by Tish Allen RN  Medication Review/Management: medications reviewed  Taken 2/9/2021 2300 by Tish Allen RN  Medication Review/Management: medications reviewed  Taken 2/9/2021 2100 by Tish Allen RN  Medication Review/Management: medications reviewed  Taken 2/9/2021 2045 by Tish Allen RN  Medication Review/Management: medications reviewed  Taken 2/9/2021 1900 by Tish Allen RN  Medication Review/Management: medications reviewed     Problem: Obstructive Sleep Apnea Risk or Actual (Comorbidity Management)  Goal: Unobstructed Breathing During Sleep  Outcome: Ongoing, Progressing     Problem: Pain Chronic (Persistent) (Comorbidity Management)  Goal: Acceptable Pain Control and Functional Ability  Outcome: Ongoing, Progressing  Intervention: Develop Pain Management Plan  Recent Flowsheet Documentation  Taken 2/10/2021 0200 by Tish Allen, RN  Pain Management Interventions:   care clustered   relaxation techniques promoted   quiet environment facilitated  Taken 2/9/2021 2045 by Tish Allen, RN  Pain Management  Interventions:   care clustered   quiet environment facilitated   relaxation techniques promoted  Intervention: Manage Persistent Pain  Recent Flowsheet Documentation  Taken 2/10/2021 0300 by Tish Allen RN  Medication Review/Management: medications reviewed  Taken 2/10/2021 0200 by Tish Allen RN  Bowel Elimination Promotion: privacy promoted  Sleep/Rest Enhancement:   awakenings minimized   regular sleep/rest pattern promoted   room darkened   relaxation techniques promoted   consistent schedule promoted  Medication Review/Management: medications reviewed  Taken 2/10/2021 0100 by Tish Allen RN  Medication Review/Management: medications reviewed  Taken 2/9/2021 2300 by Tish Allen RN  Medication Review/Management: medications reviewed  Taken 2/9/2021 2100 by Tish Allen RN  Medication Review/Management: medications reviewed  Taken 2/9/2021 2045 by Tish Allen RN  Bowel Elimination Promotion: privacy promoted  Sleep/Rest Enhancement:   awakenings minimized   consistent schedule promoted   regular sleep/rest pattern promoted   relaxation techniques promoted   room darkened  Medication Review/Management: medications reviewed  Taken 2/9/2021 1900 by Tish Allen RN  Medication Review/Management: medications reviewed  Intervention: Optimize Psychosocial Wellbeing  Recent Flowsheet Documentation  Taken 2/10/2021 0200 by Tish Allen, RN  Supportive Measures:   active listening utilized   self-care encouraged   relaxation techniques promoted  Diversional Activities: television  Family/Support System Care:   self-care encouraged   support provided  Taken 2/9/2021 2045 by Tish Allen, RN  Supportive Measures:   active listening utilized   relaxation techniques promoted   self-care encouraged   self-reflection promoted  Diversional Activities: television  Family/Support System Care:   self-care encouraged   support provided     Problem: Seizure Disorder Comorbidity  Goal: Maintenance  of Seizure Control  Outcome: Ongoing, Progressing  Intervention: Maintain Seizure-Symptom Control  Recent Flowsheet Documentation  Taken 2/10/2021 0200 by Tish Allen, RN  Seizure Precautions: activity supervised  Taken 2/10/2021 0100 by Tish Allen, RN  Seizure Precautions: activity supervised  Taken 2/9/2021 2300 by Tish Allen, RN  Seizure Precautions: activity supervised  Taken 2/9/2021 2100 by Tish Allen RN  Seizure Precautions: activity supervised  Taken 2/9/2021 2045 by Tish Allen, RN  Seizure Precautions: activity supervised  Taken 2/9/2021 1900 by Tish Allen RN  Seizure Precautions: activity supervised   Goal Outcome Evaluation:  Plan of Care Reviewed With: patient  Progress: improving  Outcome Summary: PT resting in bed at this time. PT continues to tolerate diet. PT remains on RA. PT has had no complaints of pain/discomfort throughout shift. Wound Care Provided. Fall Precautions maintained. VSS. Afebrile.

## 2021-02-10 NOTE — PROGRESS NOTES
Gastroenteritis    He is taking a regular diet without difficulty. No nausea or vomiting, but he still has loose stools. afeb and vss. Abdomen soft and not tender. No sign of obstruction, gastroenteritis appears to be resolving. He needs no surgical intervention at this time. I will see him prn.

## 2021-02-10 NOTE — PROGRESS NOTES
[facility] HOSPITALIST PROGRESS NOTE     Patient Identification:  Name:  Ken Krueger  Age:  45 y.o.  Sex:  male  :  1975  MRN:  [id number]  Visit Number:  [id number]  ROOM: [address]     Primary Care Provider:  Provider, No Known    Length of stay in inpatient status:  3    Subjective     Chief Compliant:    Chief Complaint   Patient presents with   • Vomiting   • Nausea   • Diarrhea       History of Presenting Illness:    Patient reports he continues to feel well. Denies any new complaints. Still making urine and stool through ostomies. Tolerating diet.     ROS:  Otherwise 10 point ROS negative other than documented above in HPI.     Objective     Current Hospital Meds:apixaban, 5 mg, Oral, Q12H  ARIPiprazole, 10 mg, Oral, Nightly  atorvastatin, 10 mg, Oral, Nightly  baclofen, 30 mg, Oral, 4x Daily With Meals & Nightly  cefTRIAXone, 2 g, Intravenous, Q24H  cholecalciferol, 2,000 Units, Oral, Daily  DULoxetine, 60 mg, Oral, BID  famotidine, 20 mg, Intravenous, Q12H  gabapentin, 400 mg, Oral, Q8H  insulin aspart, 0-9 Units, Subcutaneous, TID AC  lactobacillus acidophilus, 1 capsule, Oral, Daily  magic barrier cream, , Topical, 4x Daily  sodium chloride, 10 mL, Intravenous, Q12H  sodium hypochlorite, , Topical, BID         Current Antimicrobial Therapy:  Anti-Infectives (From admission, onward)    Ordered     Dose/Rate Route Frequency Start Stop    02/10/21 1014  cefTRIAXone (ROCEPHIN) 2 g/100 mL 0.9% NS IVPB (MBP)     Ordering Provider: Tra Jain MD    2 g  over 30 Minutes Intravenous Every 24 Hours 02/10/21 1100 21 1059    21 1555  vancomycin 2000 mg/500 mL 0.9% NS IVPB ([facility])     Ordering Provider: Bryce Farrar PA    2,000 mg  over 120 Minutes Intravenous Once 21 1630 21 20221 1547  aztreonam (AZACTAM) 2 g/100 mL 0.9% NS (mbp)     Ordering Provider: Bryce Farrar PA    2 g  over 30 Minutes Intravenous Once 21 1549 21 1800         Current Diuretic Therapy:  Diuretics (From admission, onward)    None        ----------------------------------------------------------------------------------------------------------------------  Vital Signs:  Temp:  [97.8 °F (36.6 °C)-98.4 °F (36.9 °C)] 97.9 °F (36.6 °C)  Heart Rate:  [79-98] 79  Resp:  [12-20] 18  BP: (113-167)/() 141/79  SpO2:  [91 %-100 %] 100 %  on  Flow (L/min):  [2] 2;   Device (Oxygen Therapy): nasal cannula  Body mass index is 37.76 kg/m².    Wt Readings from Last 3 Encounters:   02/10/21 123 kg (270 lb 11.6 oz)   06/02/20 (!) 150 kg (330 lb)   05/04/20 (!) 150 kg (330 lb)     Intake & Output (last 3 days)       02/07 0701 - 02/08 0700 02/08 0701 - 02/09 0700 02/09 0701 - 02/10 0700 02/10 0701 - 02/11 0700    P.O.  320 485 475    I.V. (mL/kg) 100 (0.7) 1329.5 (10.5) 1868.2 (15.2)     IV Piggyback 5059 250  97    Total Intake(mL/kg) 5159 (34.4) 1899.5 (15) 2353.2 (19.1) 572 (4.7)    Urine (mL/kg/hr)  4100 (1.3) 2775 (0.9) 925 (0.9)    Stool  1650 700     Total Output  5750 3475 925    Net +5159 -3850.5 -1121.9 -353                Diet Regular; Consistent Carbohydrate  ----------------------------------------------------------------------------------------------------------------------  Physical exam:  Constitutional:  Well-developed and well-nourished.  No respiratory distress.      HENT:  Head:  Normocephalic and atraumatic.  Mouth:  Moist mucous membranes.    Eyes:  Conjunctivae and EOM are normal. No scleral icterus.    Neck:  Neck supple.  No JVD present.    Cardiovascular:  Normal rate, regular rhythm and normal heart sounds with no murmur.  Pulmonary/Chest:  No respiratory distress, no wheezes, no crackles, with normal breath sounds and good air movement.  Abdominal:  Soft.  Bowel sounds are normal.  No distension and no tenderness.   Neurological:  Alert and oriented to person, place, and time.  No cranial nerve deficit.  No tongue deviation.  No facial droop.  No slurred  speech.   Skin:  Skin is warm and dry. No rash noted. No pallor.   Peripheral vascular:  Pulses in all 4 extremities with no clubbing, no cyanosis, no edema.  ----------------------------------------------------------------------------------------------------------------------  Tele:    ----------------------------------------------------------------------------------------------------------------------  Results from last 7 days   Lab Units 02/10/21  0033 02/09/21  0352 02/08/21  0903 02/08/21  0902 02/07/21  1952 02/07/21  1356   CRP mg/dL 5.83*  --   --  8.79*  --  6.41*   LACTATE mmol/L  --   --  1.7  --  2.1* 3.4*   WBC 10*3/mm3 8.06 7.19 9.64  --   --  17.88*   HEMOGLOBIN g/dL 10.3* 9.9* 10.5*  --   --  13.2   HEMATOCRIT % 38.1 35.2* 38.1  --   --  45.1   MCV fL 81.6 79.8 81.2  --   --  77.2*   MCHC g/dL 27.0* 28.1* 27.6*  --   --  29.3*   PLATELETS 10*3/mm3 404 409 459*  --   --  732*         Results from last 7 days   Lab Units 02/10/21  0033 02/09/21  1711 02/09/21  0352 02/08/21  0902 02/07/21  1356   SODIUM mmol/L 137  --  139 139 130*   POTASSIUM mmol/L 3.5 3.9 3.2* 4.2 3.6   MAGNESIUM mg/dL  --   --   --  2.1 1.6   CHLORIDE mmol/L 104  --  107 109* 92*   CO2 mmol/L 22.3  --  21.2* 19.3* 22.0   BUN mg/dL 15  --  22* 48* 59*   CREATININE mg/dL 0.82  --  0.66* 1.13 1.91*   EGFR IF NONAFRICN AM mL/min/1.73 103  --  132 71 39*   CALCIUM mg/dL 9.0  --  8.0* 7.8* 9.6   PHOSPHORUS mg/dL  --   --   --  3.0  --    GLUCOSE mg/dL 211*  --  163* 181* 327*   ALBUMIN g/dL  --   --  2.88* 3.06* 4.02   BILIRUBIN mg/dL  --   --  0.3 0.4 0.5   ALK PHOS U/L  --   --  79 86 114   AST (SGOT) U/L  --   --  25 27 24   ALT (SGPT) U/L  --   --  22 27 40   Estimated Creatinine Clearance: 155.1 mL/min (by C-G formula based on SCr of 0.82 mg/dL).  No results found for: AMMONIA  Results from last 7 days   Lab Units 02/07/21  1356   TROPONIN T ng/mL 0.029             Glucose   Date/Time Value Ref Range Status   02/10/2021 1107 247 (H)  70 - 130 mg/dL Final   02/10/2021 0632 192 (H) 70 - 130 mg/dL Final   02/10/2021 0007 205 (H) 70 - 130 mg/dL Final   02/09/2021 2202 240 (H) 70 - 130 mg/dL Final   02/09/2021 1637 181 (H) 70 - 130 mg/dL Final   02/09/2021 1101 202 (H) 70 - 130 mg/dL Final   02/09/2021 0606 188 (H) 70 - 130 mg/dL Final   02/08/2021 2353 160 (H) 70 - 130 mg/dL Final     Lab Results   Component Value Date    TSH 1.160 02/07/2021     No results found for: PREGTESTUR, PREGSERUM, HCG, HCGQUANT  Pain Management Panel     Pain Management Panel Latest Ref Rng & Units 8/19/2018 12/5/2017    AMPHETAMINES SCREEN, URINE Negative Negative Negative    BARBITURATES SCREEN Negative Negative Negative    BENZODIAZEPINE SCREEN, URINE Negative Negative Negative    BUPRENORPHINEUR Negative Negative Negative    COCAINE SCREEN, URINE Negative Negative Negative    METHADONE SCREEN, URINE Negative Negative Negative        Brief Urine Lab Results  (Last result in the past 365 days)      Color   Clarity   Blood   Leuk Est   Nitrite   Protein   CREAT   Urine HCG        02/07/21 1826 Dark Yellow Cloudy Negative Large (3+) Negative 100 mg/dL (2+)             Blood Culture   Date Value Ref Range Status   02/07/2021 No growth at 3 days  Preliminary   02/07/2021 No growth at 3 days  Preliminary     Urine Culture   Date Value Ref Range Status   02/07/2021 (A)  Final    >100,000 CFU/mL Klebsiella pneumoniae ssp pneumoniae     No results found for: WOUNDCX  No results found for: STOOLCX  No results found for: RESPCX  No results found for: AFBCX  Results from last 7 days   Lab Units 02/10/21  0033 02/08/21  0903 02/08/21  0902 02/07/21  1952 02/07/21  1356   LACTATE mmol/L  --  1.7  --  2.1* 3.4*   SED RATE mm/hr  --   --   --   --  7   CRP mg/dL 5.83*  --  8.79*  --  6.41*       I have personally looked at the labs and they are summarized  above.  ----------------------------------------------------------------------------------------------------------------------  Detailed radiology reports for the last 24 hours:    Imaging Results (Last 24 Hours)     ** No results found for the last 24 hours. **        Assessment & Plan    #Septic shock 2/2 UTI  - Urine culture growing Klebsiella pneumonia. Sensitivities reviewed. ID consulted.   - Abx transitioned to rocephin. Will monitor   - GI PCR negative.     #Ileus vs. Possible small bowel obstruction   - CT the abdomen and pelvis without contrast showed multiple dilated loops of bowel, no significant free fluid, possibly representing potential small bowel stricture versus ileus  - Patient now passing liquid stool in osotomy   - Surgery consulted. Appreciate recs. Diet advanced.     #JAKE  - Prerenal vs. ATN from sepsis. CT did not reveal evidence of obstructive uropathy.   - Cr has improved 1.91=>1.13=>0.66.=>0.82    #Paraplegia 2/2 MA  #Multiple decubitus ulcers  - Wound care consulted. Ostomy care. Supportive care.     #Hypokalemia  - Continue to replace as per protocol     #DM II  - SSI    #ARLIN on CPAP     F: PO  E: Replace as needed   N: Advance to CLD    Code status: Full     Dispo: Pending clinical improvement     VTE Prophylaxis:   Mechanical Order History:     None      Pharmalogical Order History:      Ordered     Dose Route Frequency Stop    02/09/21 0747  apixaban (ELIQUIS) tablet 5 mg      5 mg PO Every 12 Hours Scheduled --                Brannon Adrian MD  [facility]  02/10/21  15:39 EST
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
## 2021-02-10 NOTE — PROGRESS NOTES
Discharge Planning Assessment  MAKI Regalado     Patient Name: Ken Krueger  MRN: [id number]  Today's Date: 2/10/2021    Admit Date: 2/7/2021      Discharge Plan     Row Name 02/10/21 1400       Plan    Plan  Pt was admitted on 2/7/21. SS spoke with Pt. Pt plans to return home at discharge and family will transport. SS will continue to follow and assist with discharge planning.    Patient/Family in Agreement with Plan  yes          Expected Discharge Date and Time     Expected Discharge Date Expected Discharge Time    Feb 12, 2021           Jessica Kilgore

## 2021-02-10 NOTE — PLAN OF CARE
Goal Outcome Evaluation:  Progress: no change  Outcome Summary: VSS. A&O x4. 3L/NC PRN during sleep for sleep apnea. Afebrile. Estella Cardenas RN for wound care ordered pt wound vac to be placed back on with wound care Q12 hr. Pt slept most of day if NC taken off pt sat's dropped into 60's-70's.
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
## 2021-02-10 NOTE — PHARMACY PATIENT ASSISTANCE
Pharmacy checked on the cost of home med Eliquis. Per his pharmacy, patient has a $0 copay.    Thank you,  Anna Carlson, YareliD

## 2021-02-11 ENCOUNTER — APPOINTMENT (OUTPATIENT)
Dept: MRI IMAGING | Facility: HOSPITAL | Age: 46
End: 2021-02-11

## 2021-02-11 LAB
ANION GAP SERPL CALCULATED.3IONS-SCNC: 10 MMOL/L (ref 5–15)
BASOPHILS # BLD AUTO: 0.05 10*3/MM3 (ref 0–0.2)
BASOPHILS NFR BLD AUTO: 0.6 % (ref 0–1.5)
BUN SERPL-MCNC: 13 MG/DL (ref 6–20)
BUN/CREAT SERPL: 21 (ref 7–25)
CALCIUM SPEC-SCNC: 9.9 MG/DL (ref 8.6–10.5)
CHLORIDE SERPL-SCNC: 95 MMOL/L (ref 98–107)
CO2 SERPL-SCNC: 23 MMOL/L (ref 22–29)
CREAT SERPL-MCNC: 0.62 MG/DL (ref 0.76–1.27)
CRP SERPL-MCNC: 3.5 MG/DL (ref 0–0.5)
DEPRECATED RDW RBC AUTO: 47.1 FL (ref 37–54)
EOSINOPHIL # BLD AUTO: 0.17 10*3/MM3 (ref 0–0.4)
EOSINOPHIL NFR BLD AUTO: 1.9 % (ref 0.3–6.2)
ERYTHROCYTE [DISTWIDTH] IN BLOOD BY AUTOMATED COUNT: 16.3 % (ref 12.3–15.4)
GFR SERPL CREATININE-BSD FRML MDRD: 142 ML/MIN/1.73
GLUCOSE BLDC GLUCOMTR-MCNC: 211 MG/DL (ref 70–130)
GLUCOSE BLDC GLUCOMTR-MCNC: 255 MG/DL (ref 70–130)
GLUCOSE BLDC GLUCOMTR-MCNC: 266 MG/DL (ref 70–130)
GLUCOSE BLDC GLUCOMTR-MCNC: 282 MG/DL (ref 70–130)
GLUCOSE BLDC GLUCOMTR-MCNC: 300 MG/DL (ref 70–130)
GLUCOSE SERPL-MCNC: 235 MG/DL (ref 65–99)
HCT VFR BLD AUTO: 38.4 % (ref 37.5–51)
HGB BLD-MCNC: 10.6 G/DL (ref 13–17.7)
IMM GRANULOCYTES # BLD AUTO: 0.05 10*3/MM3 (ref 0–0.05)
IMM GRANULOCYTES NFR BLD AUTO: 0.6 % (ref 0–0.5)
LYMPHOCYTES # BLD AUTO: 2.33 10*3/MM3 (ref 0.7–3.1)
LYMPHOCYTES NFR BLD AUTO: 25.9 % (ref 19.6–45.3)
MCH RBC QN AUTO: 22.2 PG (ref 26.6–33)
MCHC RBC AUTO-ENTMCNC: 27.6 G/DL (ref 31.5–35.7)
MCV RBC AUTO: 80.5 FL (ref 79–97)
MONOCYTES # BLD AUTO: 0.39 10*3/MM3 (ref 0.1–0.9)
MONOCYTES NFR BLD AUTO: 4.3 % (ref 5–12)
NEUTROPHILS NFR BLD AUTO: 6.02 10*3/MM3 (ref 1.7–7)
NEUTROPHILS NFR BLD AUTO: 66.7 % (ref 42.7–76)
NRBC BLD AUTO-RTO: 0 /100 WBC (ref 0–0.2)
PLATELET # BLD AUTO: 393 10*3/MM3 (ref 140–450)
PMV BLD AUTO: 10 FL (ref 6–12)
POTASSIUM SERPL-SCNC: 3.8 MMOL/L (ref 3.5–5.2)
RBC # BLD AUTO: 4.77 10*6/MM3 (ref 4.14–5.8)
SODIUM SERPL-SCNC: 128 MMOL/L (ref 136–145)
WBC # BLD AUTO: 9.01 10*3/MM3 (ref 3.4–10.8)

## 2021-02-11 PROCEDURE — 86140 C-REACTIVE PROTEIN: CPT | Performed by: NURSE PRACTITIONER

## 2021-02-11 PROCEDURE — 87186 SC STD MICRODIL/AGAR DIL: CPT | Performed by: INTERNAL MEDICINE

## 2021-02-11 PROCEDURE — 87181 SC STD AGAR DILUTION PER AGT: CPT | Performed by: INTERNAL MEDICINE

## 2021-02-11 PROCEDURE — A9577 INJ MULTIHANCE: HCPCS | Performed by: INTERNAL MEDICINE

## 2021-02-11 PROCEDURE — 85025 COMPLETE CBC W/AUTO DIFF WBC: CPT | Performed by: INTERNAL MEDICINE

## 2021-02-11 PROCEDURE — 94660 CPAP INITIATION&MGMT: CPT

## 2021-02-11 PROCEDURE — 94799 UNLISTED PULMONARY SVC/PX: CPT

## 2021-02-11 PROCEDURE — 0 GADOBENATE DIMEGLUMINE 529 MG/ML SOLUTION: Performed by: INTERNAL MEDICINE

## 2021-02-11 PROCEDURE — 87205 SMEAR GRAM STAIN: CPT | Performed by: INTERNAL MEDICINE

## 2021-02-11 PROCEDURE — 63710000001 INSULIN ASPART PER 5 UNITS: Performed by: INTERNAL MEDICINE

## 2021-02-11 PROCEDURE — 72197 MRI PELVIS W/O & W/DYE: CPT | Performed by: RADIOLOGY

## 2021-02-11 PROCEDURE — 25010000002 CEFTRIAXONE PER 250 MG: Performed by: INTERNAL MEDICINE

## 2021-02-11 PROCEDURE — 72197 MRI PELVIS W/O & W/DYE: CPT

## 2021-02-11 PROCEDURE — 99233 SBSQ HOSP IP/OBS HIGH 50: CPT | Performed by: INTERNAL MEDICINE

## 2021-02-11 PROCEDURE — 87070 CULTURE OTHR SPECIMN AEROBIC: CPT | Performed by: INTERNAL MEDICINE

## 2021-02-11 PROCEDURE — 82962 GLUCOSE BLOOD TEST: CPT

## 2021-02-11 PROCEDURE — 87147 CULTURE TYPE IMMUNOLOGIC: CPT | Performed by: INTERNAL MEDICINE

## 2021-02-11 PROCEDURE — 87186 SC STD MICRODIL/AGAR DIL: CPT

## 2021-02-11 PROCEDURE — 80048 BASIC METABOLIC PNL TOTAL CA: CPT | Performed by: INTERNAL MEDICINE

## 2021-02-11 PROCEDURE — 87077 CULTURE AEROBIC IDENTIFY: CPT | Performed by: INTERNAL MEDICINE

## 2021-02-11 RX ADMIN — ATORVASTATIN CALCIUM 10 MG: 10 TABLET, FILM COATED ORAL at 20:18

## 2021-02-11 RX ADMIN — GABAPENTIN 400 MG: 400 CAPSULE ORAL at 05:32

## 2021-02-11 RX ADMIN — FAMOTIDINE 20 MG: 10 INJECTION INTRAVENOUS at 08:12

## 2021-02-11 RX ADMIN — INSULIN ASPART 6 UNITS: 100 INJECTION, SOLUTION INTRAVENOUS; SUBCUTANEOUS at 11:23

## 2021-02-11 RX ADMIN — SODIUM HYPOCHLORITE: 1.25 SOLUTION TOPICAL at 11:22

## 2021-02-11 RX ADMIN — GADOBENATE DIMEGLUMINE 20 ML: 529 INJECTION, SOLUTION INTRAVENOUS at 13:00

## 2021-02-11 RX ADMIN — APIXABAN 5 MG: 5 TABLET, FILM COATED ORAL at 20:18

## 2021-02-11 RX ADMIN — GABAPENTIN 400 MG: 400 CAPSULE ORAL at 16:04

## 2021-02-11 RX ADMIN — BACLOFEN 30 MG: 10 TABLET ORAL at 11:23

## 2021-02-11 RX ADMIN — APIXABAN 5 MG: 5 TABLET, FILM COATED ORAL at 08:12

## 2021-02-11 RX ADMIN — SODIUM CHLORIDE, PRESERVATIVE FREE 10 ML: 5 INJECTION INTRAVENOUS at 20:18

## 2021-02-11 RX ADMIN — BACLOFEN 30 MG: 10 TABLET ORAL at 17:52

## 2021-02-11 RX ADMIN — FAMOTIDINE 20 MG: 10 INJECTION INTRAVENOUS at 20:18

## 2021-02-11 RX ADMIN — CEFTRIAXONE 2 G: 2 INJECTION, POWDER, FOR SOLUTION INTRAMUSCULAR; INTRAVENOUS at 11:27

## 2021-02-11 RX ADMIN — GABAPENTIN 400 MG: 400 CAPSULE ORAL at 21:50

## 2021-02-11 RX ADMIN — Medication 1 CAPSULE: at 08:12

## 2021-02-11 RX ADMIN — Medication: at 17:51

## 2021-02-11 RX ADMIN — DULOXETINE HYDROCHLORIDE 60 MG: 60 CAPSULE, DELAYED RELEASE ORAL at 20:18

## 2021-02-11 RX ADMIN — BACLOFEN 30 MG: 10 TABLET ORAL at 20:18

## 2021-02-11 RX ADMIN — ARIPIPRAZOLE 10 MG: 10 TABLET ORAL at 20:18

## 2021-02-11 RX ADMIN — INSULIN ASPART 6 UNITS: 100 INJECTION, SOLUTION INTRAVENOUS; SUBCUTANEOUS at 08:12

## 2021-02-11 RX ADMIN — CHOLECALCIFEROL TAB 10 MCG (400 UNIT) 2000 UNITS: 10 TAB at 08:11

## 2021-02-11 RX ADMIN — SODIUM HYPOCHLORITE: 1.25 SOLUTION TOPICAL at 20:18

## 2021-02-11 RX ADMIN — Medication: at 11:27

## 2021-02-11 RX ADMIN — Medication: at 20:18

## 2021-02-11 RX ADMIN — Medication: at 11:20

## 2021-02-11 RX ADMIN — DULOXETINE HYDROCHLORIDE 60 MG: 60 CAPSULE, DELAYED RELEASE ORAL at 08:11

## 2021-02-11 RX ADMIN — SODIUM CHLORIDE, PRESERVATIVE FREE 10 ML: 5 INJECTION INTRAVENOUS at 08:17

## 2021-02-11 RX ADMIN — INSULIN ASPART 6 UNITS: 100 INJECTION, SOLUTION INTRAVENOUS; SUBCUTANEOUS at 17:50

## 2021-02-11 RX ADMIN — BACLOFEN 30 MG: 10 TABLET ORAL at 08:12

## 2021-02-11 NOTE — PROGRESS NOTES
PROGRESS NOTE         Patient Identification:  Name:  Ken Krueger  Age:  45 y.o.  Sex:  male  :  1975  MRN:  [id number]  Visit Number:  [id number]  Primary Care Provider:  Provider, No Known         LOS: 4 days       ----------------------------------------------------------------------------------------------------------------------  Subjective       Chief Complaints:    Vomiting, Nausea, and Diarrhea        Interval History:      Patient is sitting up in bed this morning on 2 L nasal cannula in no apparent distress.  Denies any complaints at this time.  Afebrile.  No increased output from ostomy. CRP is improving at 3.50.  WBC remains normal.    Review of Systems:    Constitutional: no fever, chills and night sweats. No appetite change or unexpected weight change. No fatigue.  Eyes: no eye drainage, itching or redness.  HEENT: no mouth sores, dysphagia or nose bleed.  Respiratory: no for shortness of breath, cough or production of sputum.  Cardiovascular: no chest pain, no palpitations, no orthopnea.  Gastrointestinal: no nausea, vomiting or diarrhea. No abdominal pain, hematemesis or rectal bleeding.  Genitourinary: no dysuria or polyuria.  Hematologic/lymphatic: no lymph node abnormalities, no easy bruising or easy bleeding.  Musculoskeletal: no muscle or joint pain.  Skin: No rash and no itching.  Neurological: no loss of consciousness, no seizure, no headache.  Behavioral/Psych: no depression or suicidal ideation.  Endocrine: no hot flashes.  Immunologic: negative.    ----------------------------------------------------------------------------------------------------------------------      Objective       [facility] Meds:  apixaban, 5 mg, Oral, Q12H  ARIPiprazole, 10 mg, Oral, Nightly  atorvastatin, 10 mg, Oral, Nightly  baclofen, 30 mg, Oral, 4x Daily With Meals & Nightly  cefTRIAXone, 2 g, Intravenous, Q24H  cholecalciferol, 2,000 Units, Oral, Daily  DULoxetine, 60 mg, Oral,  BID  famotidine, 20 mg, Intravenous, Q12H  gabapentin, 400 mg, Oral, Q8H  gadobenate dimeglumine, 20 mL, Intravenous, Once in imaging  insulin aspart, 0-9 Units, Subcutaneous, TID AC  lactobacillus acidophilus, 1 capsule, Oral, Daily  magic barrier cream, , Topical, 4x Daily  sodium chloride, 10 mL, Intravenous, Q12H  sodium hypochlorite, , Topical, BID         ----------------------------------------------------------------------------------------------------------------------    Vital Signs:  Temp:  [97.6 °F (36.4 °C)-98.4 °F (36.9 °C)] 98.4 °F (36.9 °C)  Heart Rate:  [71-98] 71  Resp:  [12-21] 17  BP: (129-160)/() 144/89  Mean Arterial Pressure (Non-Invasive) for the past 24 hrs (Last 3 readings):   Noninvasive MAP (mmHg)   02/11/21 1102 116   02/11/21 1002 122   02/11/21 0802 105     SpO2 Percentage    02/11/21 0802 02/11/21 1002 02/11/21 1102   SpO2: 94% 98% 96%     SpO2:  [92 %-100 %] 96 %  on  Flow (L/min):  [2] 2;   Device (Oxygen Therapy): nasal cannula    Body mass index is 38.77 kg/m².  Wt Readings from Last 3 Encounters:   02/11/21 126 kg (278 lb)   06/02/20 (!) 150 kg (330 lb)   05/04/20 (!) 150 kg (330 lb)        Intake/Output Summary (Last 24 hours) at 2/11/2021 1216  Last data filed at 2/11/2021 1127  Gross per 24 hour   Intake 2186.95 ml   Output 2500 ml   Net -313.05 ml     Diet Regular; Consistent Carbohydrate  ----------------------------------------------------------------------------------------------------------------------      Physical Exam:    Constitutional:  Well-developed and well-nourished.  No respiratory distress.      HENT:  Head: Normocephalic and atraumatic.  Mouth:  Moist mucous membranes.    Eyes:  Conjunctivae and EOM are normal.  No scleral icterus.  Neck:  Neck supple.  No JVD present.    Cardiovascular:  Normal rate, regular rhythm and normal heart sounds with no murmur. No edema.  Pulmonary/Chest:  No respiratory distress, no wheezes, no crackles, with normal breath  sounds and good air movement.  Abdominal:  Soft.  Bowel sounds are normal.  Positive distention, no tenderness.  Colostomy and urostomy.  Musculoskeletal: Left AKA, right leg atrophy.  Neurological:  Alert and oriented to person, place, and time.  No facial droop.  No slurred speech.   Skin:  Skin is warm and dry.  No rash noted.  No pallor.   Psychiatric:  Normal mood and affect.  Behavior is normal.    ----------------------------------------------------------------------------------------------------------------------  Results from last 7 days   Lab Units 02/07/21  1356   TROPONIN T ng/mL 0.029           Results from last 7 days   Lab Units 02/11/21  0051 02/10/21  0033 02/09/21  0352 02/08/21  0903 02/08/21  0902 02/07/21  1952 02/07/21  1356   CRP mg/dL 3.50* 5.83*  --   --  8.79*  --  6.41*   LACTATE mmol/L  --   --   --  1.7  --  2.1* 3.4*   WBC 10*3/mm3 9.01 8.06 7.19 9.64  --   --  17.88*   HEMOGLOBIN g/dL 10.6* 10.3* 9.9* 10.5*  --   --  13.2   HEMATOCRIT % 38.4 38.1 35.2* 38.1  --   --  45.1   MCV fL 80.5 81.6 79.8 81.2  --   --  77.2*   MCHC g/dL 27.6* 27.0* 28.1* 27.6*  --   --  29.3*   PLATELETS 10*3/mm3 393 404 409 459*  --   --  732*     Results from last 7 days   Lab Units 02/11/21  0051 02/10/21  1720 02/10/21  0033  02/09/21  0352 02/08/21  0902 02/07/21  1356   SODIUM mmol/L 128*  --  137  --  139 139 130*   POTASSIUM mmol/L 3.8 4.0 3.5   < > 3.2* 4.2 3.6   MAGNESIUM mg/dL  --   --   --   --   --  2.1 1.6   CHLORIDE mmol/L 95*  --  104  --  107 109* 92*   CO2 mmol/L 23.0  --  22.3  --  21.2* 19.3* 22.0   BUN mg/dL 13  --  15  --  22* 48* 59*   CREATININE mg/dL 0.62*  --  0.82  --  0.66* 1.13 1.91*   EGFR IF NONAFRICN AM mL/min/1.73 142  --  103  --  132 71 39*   CALCIUM mg/dL 9.9  --  9.0  --  8.0* 7.8* 9.6   GLUCOSE mg/dL 235*  --  211*  --  163* 181* 327*   ALBUMIN g/dL  --   --   --   --  2.88* 3.06* 4.02   BILIRUBIN mg/dL  --   --   --   --  0.3 0.4 0.5   ALK PHOS U/L  --   --   --   --  79  86 114   AST (SGOT) U/L  --   --   --   --  25 27 24   ALT (SGPT) U/L  --   --   --   --  22 27 40    < > = values in this interval not displayed.   Estimated Creatinine Clearance: 207.7 mL/min (A) (by C-G formula based on SCr of 0.62 mg/dL (L)).  No results found for: AMMONIA    Glucose   Date/Time Value Ref Range Status   02/11/2021 1106 282 (H) 70 - 130 mg/dL Final   02/11/2021 0510 255 (H) 70 - 130 mg/dL Final   02/11/2021 0011 211 (H) 70 - 130 mg/dL Final   02/10/2021 2241 202 (H) 70 - 130 mg/dL Final   02/10/2021 1710 220 (H) 70 - 130 mg/dL Final   02/10/2021 1107 247 (H) 70 - 130 mg/dL Final   02/10/2021 0632 192 (H) 70 - 130 mg/dL Final   02/10/2021 0007 205 (H) 70 - 130 mg/dL Final     Lab Results   Component Value Date    HGBA1C 8.90 (H) 10/19/2019     Lab Results   Component Value Date    TSH 1.160 02/07/2021       Blood Culture   Date Value Ref Range Status   02/07/2021 No growth at 3 days  Preliminary   02/07/2021 No growth at 3 days  Preliminary     Urine Culture   Date Value Ref Range Status   02/07/2021 (A)  Final    >100,000 CFU/mL Klebsiella pneumoniae ssp pneumoniae     No results found for: WOUNDCX  No results found for: STOOLCX  No results found for: RESPCX  Pain Management Panel     Pain Management Panel Latest Ref Rng & Units 8/19/2018 12/5/2017    AMPHETAMINES SCREEN, URINE Negative Negative Negative    BARBITURATES SCREEN Negative Negative Negative    BENZODIAZEPINE SCREEN, URINE Negative Negative Negative    BUPRENORPHINEUR Negative Negative Negative    COCAINE SCREEN, URINE Negative Negative Negative    METHADONE SCREEN, URINE Negative Negative Negative            ----------------------------------------------------------------------------------------------------------------------  Imaging Results (Last 24 Hours)     Procedure Component Value Units Date/Time    MRI Pelvis With & Without Contrast [[id number]] Resulted: 02/11/21 1059     Updated: 02/11/21 1059           ----------------------------------------------------------------------------------------------------------------------    Assessment/Plan       Assessment/Plan     ASSESSMENT:    1.  Severe sepsis with lactic acid greater than 2 on admission  2.  UTI    PLAN:    Patient is sitting up in bed this morning on 2 L nasal cannula in no apparent distress.  Denies any complaints at this time.  Afebrile.  No increased output from ostomy. CRP is improving at 3.50.  WBC remains normal.    COVID-19 PCR negative.  Blood culture showed no growth thus far.  Lactic acid 3.4 on admission.  GI panel negative.  Urinalysis positive with urine culture finalizing as Klebsiella pneumoniae.  Chest x-ray and abdominal x-ray unremarkable.  ET of the abdomen and pelvis from 2/7/2021 reports multiple dilated loops of small bowel that may represent potential small bowel obstruction versus ileus.     Based on finalization of urine culture results antibiotic therapy was changed to Rocephin 2 g IV every 24 hours as patient has tolerated both Rocephin and cefepime in the past.  Upon discharge patient can be further deescalated to Omnicef 300 mg p.o. twice daily to continue through 2/19/2021.      Discussed case with Dr. Adrian.  He is concern for worsening decubitus ulcers with worsening drainage and redness.  Patient has a history of osteomyelitis in October 2019.  Primary team are acquiring an MRI and wound cultures.  We will follow results and adjust therapy as appropriate.    Code Status:   Code Status and Medical Interventions:   Ordered at: 02/07/21 1753     Code Status:    CPR     Medical Interventions (Level of Support Prior to Arrest):    KAYLAH Pulido  02/11/21  12:16 EST

## 2021-02-11 NOTE — NURSING NOTE
Pt O2 sat dropped to 45% on room air while sleeping, nursing staff at bedside. Dr. Adrian in room, Pt woke up and stated he wears CPAP at home, O2 sat returned to 97%. Nasal cannula applied at 2L, pt states he feels fine, that it is normal for him. New orders noted via Dr. Adrian for CPAP, RT notified.

## 2021-02-11 NOTE — PLAN OF CARE
Goal Outcome Evaluation:  Plan of Care Reviewed With: patient  Progress: improving       Pt VS stable, afebrile, O2 @ 2L via nasal cannula. Wound vac applied today by wound care nurse, IV antibiotics ongoing. Pt tolerating plan of care well, no complaints at this time, will continue to monitor.      Problem: Adult Inpatient Plan of Care  Goal: Plan of Care Review  Outcome: Ongoing, Progressing  Flowsheets (Taken 2/11/2021 1628)  Progress: improving  Plan of Care Reviewed With: patient  Goal: Patient-Specific Goal (Individualized)  Outcome: Ongoing, Progressing  Goal: Absence of Hospital-Acquired Illness or Injury  Outcome: Ongoing, Progressing  Intervention: Identify and Manage Fall Risk  Recent Flowsheet Documentation  Taken 2/11/2021 1101 by Hortensia Rush RN  Safety Promotion/Fall Prevention: activity supervised  Taken 2/11/2021 0900 by Hortensia Rush RN  Safety Promotion/Fall Prevention:   activity supervised   safety round/check completed  Taken 2/11/2021 0700 by Hortensia Rush RN  Safety Promotion/Fall Prevention:   activity supervised   safety round/check completed  Intervention: Prevent and Manage VTE (venous thromboembolism) Risk  Recent Flowsheet Documentation  Taken 2/11/2021 0830 by Hortensia Rush RN  VTE Prevention/Management: (see MAR) other (see comments)  Intervention: Prevent Infection  Recent Flowsheet Documentation  Taken 2/11/2021 1101 by Hortensia Rush RN  Infection Prevention: rest/sleep promoted  Taken 2/11/2021 0900 by Hortensia Rush RN  Infection Prevention: rest/sleep promoted  Taken 2/11/2021 0700 by Hortensia Rush RN  Infection Prevention: rest/sleep promoted  Goal: Optimal Comfort and Wellbeing  Outcome: Ongoing, Progressing  Intervention: Provide Person-Centered Care  Recent Flowsheet Documentation  Taken 2/11/2021 0830 by Hortensia Rush RN  Trust Relationship/Rapport:   care explained   questions answered  Goal: Readiness for Transition of Care  Outcome: Ongoing, Progressing      Problem: Skin Injury Risk Increased  Goal: Skin Health and Integrity  Outcome: Ongoing, Progressing  Intervention: Optimize Skin Protection  Recent Flowsheet Documentation  Taken 2/11/2021 0830 by Hortensia Rush RN  Pressure Reduction Techniques: weight shift assistance provided  Pressure Reduction Devices: pressure-redistributing mattress utilized  Skin Protection:   tubing/devices free from skin contact   transparent dressing maintained  Intervention: Promote and Optimize Oral Intake  Recent Flowsheet Documentation  Taken 2/11/2021 0830 by Hortensia Rush RN  Oral Nutrition Promotion:   rest periods promoted   medicated     Problem: Fall Injury Risk  Goal: Absence of Fall and Fall-Related Injury  Outcome: Ongoing, Progressing  Intervention: Identify and Manage Contributors to Fall Injury Risk  Recent Flowsheet Documentation  Taken 2/11/2021 1101 by Hortensia Rush RN  Medication Review/Management: medications reviewed  Taken 2/11/2021 0900 by Hortensia Rush RN  Medication Review/Management: medications reviewed  Taken 2/11/2021 0700 by Hortensia Rush RN  Medication Review/Management: medications reviewed  Intervention: Promote Injury-Free Environment  Recent Flowsheet Documentation  Taken 2/11/2021 1101 by Hortensia Rush RN  Safety Promotion/Fall Prevention: activity supervised  Taken 2/11/2021 0900 by Hortensia Rush RN  Safety Promotion/Fall Prevention:   activity supervised   safety round/check completed  Taken 2/11/2021 0700 by Hortensia Rush RN  Safety Promotion/Fall Prevention:   activity supervised   safety round/check completed     Problem: Asthma Comorbidity  Goal: Maintenance of Asthma Control  Outcome: Ongoing, Progressing  Intervention: Maintain Asthma Symptom Control  Recent Flowsheet Documentation  Taken 2/11/2021 1101 by Hortensia Rush RN  Medication Review/Management: medications reviewed  Taken 2/11/2021 0900 by Hortensia Rush RN  Medication Review/Management: medications reviewed  Taken  2/11/2021 0700 by Hortensia Rush RN  Medication Review/Management: medications reviewed     Problem: COPD Comorbidity  Goal: Maintenance of COPD Symptom Control  Outcome: Ongoing, Progressing  Intervention: Maintain COPD-Symptom Control  Recent Flowsheet Documentation  Taken 2/11/2021 1101 by Hortensia Rush RN  Medication Review/Management: medications reviewed  Taken 2/11/2021 0900 by Hortensia Rush RN  Medication Review/Management: medications reviewed  Taken 2/11/2021 0700 by Hortensia Rush RN  Medication Review/Management: medications reviewed     Problem: Diabetes Comorbidity  Goal: Blood Glucose Level Within Desired Range  Outcome: Ongoing, Progressing  Intervention: Maintain Glycemic Control  Recent Flowsheet Documentation  Taken 2/11/2021 0830 by Hortensia Rush RN  Glycemic Management: blood glucose monitoring     Problem: Heart Failure Comorbidity  Goal: Maintenance of Heart Failure Symptom Control  Outcome: Ongoing, Progressing  Intervention: Maintain Heart Failure-Management Strategies  Recent Flowsheet Documentation  Taken 2/11/2021 1101 by Hortensia Rush RN  Medication Review/Management: medications reviewed  Taken 2/11/2021 0900 by Hortensia Rush RN  Medication Review/Management: medications reviewed  Taken 2/11/2021 0700 by Hortensia Rush RN  Medication Review/Management: medications reviewed     Problem: Hypertension Comorbidity  Goal: Blood Pressure in Desired Range  Outcome: Ongoing, Progressing  Intervention: Maintain Hypertension-Management Strategies  Recent Flowsheet Documentation  Taken 2/11/2021 1101 by Hortensia Rush RN  Medication Review/Management: medications reviewed  Taken 2/11/2021 0900 by Hortensia Rush RN  Medication Review/Management: medications reviewed  Taken 2/11/2021 0700 by Hortensia Rush RN  Medication Review/Management: medications reviewed     Problem: Obstructive Sleep Apnea Risk or Actual (Comorbidity Management)  Goal: Unobstructed Breathing During  Sleep  Outcome: Ongoing, Progressing     Problem: Pain Chronic (Persistent) (Comorbidity Management)  Goal: Acceptable Pain Control and Functional Ability  Outcome: Ongoing, Progressing  Intervention: Develop Pain Management Plan  Recent Flowsheet Documentation  Taken 2/11/2021 0830 by Hortensia Rush RN  Pain Management Interventions: care clustered  Intervention: Manage Persistent Pain  Recent Flowsheet Documentation  Taken 2/11/2021 1101 by Hortensia Rush RN  Medication Review/Management: medications reviewed  Taken 2/11/2021 0900 by Hortensia Rush RN  Medication Review/Management: medications reviewed  Taken 2/11/2021 0830 by Hortensia Rush RN  Bowel Elimination Promotion: adequate fluid intake promoted  Sleep/Rest Enhancement:   awakenings minimized   regular sleep/rest pattern promoted  Taken 2/11/2021 0700 by Hortensia Rush RN  Medication Review/Management: medications reviewed  Intervention: Optimize Psychosocial Wellbeing  Recent Flowsheet Documentation  Taken 2/11/2021 0830 by Hortensia Rush RN  Supportive Measures: active listening utilized  Diversional Activities: television  Family/Support System Care: support provided     Problem: Seizure Disorder Comorbidity  Goal: Maintenance of Seizure Control  Outcome: Ongoing, Progressing      Declined

## 2021-02-11 NOTE — PLAN OF CARE
Problem: Adult Inpatient Plan of Care  Goal: Plan of Care Review  Outcome: Ongoing, Progressing  Flowsheets (Taken 2/11/2021 0310)  Progress: improving  Plan of Care Reviewed With: patient  Outcome Summary: PT resting in bed at this time. PT is on room air with 2L NC PRN if needed for sleep apnea.  Wound Care provided. Colostomy bag changed, and site cleaned. VSS. Afebrile. No other complaints or discomfort noted.  Goal: Patient-Specific Goal (Individualized)  Outcome: Ongoing, Progressing  Goal: Absence of Hospital-Acquired Illness or Injury  Outcome: Ongoing, Progressing  Intervention: Identify and Manage Fall Risk  Recent Flowsheet Documentation  Taken 2/11/2021 0300 by Tish Allen, RN  Safety Promotion/Fall Prevention:   activity supervised   assistive device/personal items within reach   clutter free environment maintained   fall prevention program maintained   nonskid shoes/slippers when out of bed   room organization consistent   safety round/check completed  Taken 2/11/2021 0205 by Tish Allen, RN  Safety Promotion/Fall Prevention:   activity supervised   assistive device/personal items within reach   clutter free environment maintained   fall prevention program maintained   nonskid shoes/slippers when out of bed   room organization consistent   safety round/check completed  Taken 2/10/2021 2100 by Tish Allen, RN  Safety Promotion/Fall Prevention:   activity supervised   assistive device/personal items within reach   clutter free environment maintained   fall prevention program maintained   nonskid shoes/slippers when out of bed   safety round/check completed   room organization consistent  Taken 2/10/2021 2003 by Tish Allen, RN  Safety Promotion/Fall Prevention:   activity supervised   clutter free environment maintained   assistive device/personal items within reach   fall prevention program maintained   nonskid shoes/slippers when out of bed   safety round/check completed   room  organization consistent  Taken 2/10/2021 1900 by Tish Allen RN  Safety Promotion/Fall Prevention:   activity supervised   assistive device/personal items within reach   clutter free environment maintained   fall prevention program maintained   nonskid shoes/slippers when out of bed   room organization consistent   safety round/check completed  Intervention: Prevent and Manage VTE (venous thromboembolism) Risk  Recent Flowsheet Documentation  Taken 2/11/2021 0205 by Tish Allen RN  VTE Prevention/Management: (See Mar) other (see comments)  Taken 2/10/2021 2003 by Tish Allen RN  VTE Prevention/Management: (See MAR) other (see comments)  Intervention: Prevent Infection  Recent Flowsheet Documentation  Taken 2/11/2021 0300 by Tish Allen RN  Infection Prevention: rest/sleep promoted  Taken 2/11/2021 0205 by Tish Allen RN  Infection Prevention: rest/sleep promoted  Taken 2/11/2021 0100 by Tish Allen RN  Infection Prevention: rest/sleep promoted  Taken 2/10/2021 2300 by Tish Allen RN  Infection Prevention: rest/sleep promoted  Taken 2/10/2021 2100 by Tish Allen RN  Infection Prevention: rest/sleep promoted  Taken 2/10/2021 2003 by Tish Allen RN  Infection Prevention: rest/sleep promoted  Taken 2/10/2021 1900 by Tish Allen RN  Infection Prevention: rest/sleep promoted  Goal: Optimal Comfort and Wellbeing  Outcome: Ongoing, Progressing  Intervention: Provide Person-Centered Care  Recent Flowsheet Documentation  Taken 2/11/2021 0205 by Tish Allen RN  Trust Relationship/Rapport:   care explained   choices provided   empathic listening provided   thoughts/feelings acknowledged  Taken 2/10/2021 2003 by Tish Allen RN  Trust Relationship/Rapport:   care explained   choices provided   empathic listening provided   thoughts/feelings acknowledged  Goal: Readiness for Transition of Care  Outcome: Ongoing, Progressing     Problem: Skin Injury Risk Increased  Goal:  Skin Health and Integrity  Outcome: Ongoing, Progressing  Intervention: Optimize Skin Protection  Recent Flowsheet Documentation  Taken 2/11/2021 0205 by Tish Allen, RN  Pressure Reduction Techniques: weight shift assistance provided  Pressure Reduction Devices: pressure-redistributing mattress utilized  Skin Protection:   adhesive use limited   incontinence pads utilized   skin-to-device areas padded  Taken 2/10/2021 2003 by Tish Allen, RN  Pressure Reduction Techniques: weight shift assistance provided  Pressure Reduction Devices: positioning supports utilized  Skin Protection:   adhesive use limited   incontinence pads utilized   skin-to-device areas padded   pulse oximeter probe site changed  Intervention: Promote and Optimize Oral Intake  Recent Flowsheet Documentation  Taken 2/11/2021 0205 by Tish Allen, RN  Oral Nutrition Promotion: rest periods promoted  Taken 2/10/2021 2003 by Tish Allen RN  Oral Nutrition Promotion: rest periods promoted     Problem: Fall Injury Risk  Goal: Absence of Fall and Fall-Related Injury  Outcome: Ongoing, Progressing  Intervention: Identify and Manage Contributors to Fall Injury Risk  Recent Flowsheet Documentation  Taken 2/11/2021 0300 by Tish Allen RN  Medication Review/Management: medications reviewed  Taken 2/11/2021 0205 by Tish Allen, RN  Medication Review/Management: medications reviewed  Self-Care Promotion:   independence encouraged   BADL personal objects within reach   BADL personal routines maintained  Taken 2/11/2021 0100 by Tish Allen, RN  Medication Review/Management: medications reviewed  Taken 2/10/2021 2300 by Tish Allen, RN  Medication Review/Management: medications reviewed  Taken 2/10/2021 2100 by Tish Allen, RN  Medication Review/Management: medications reviewed  Taken 2/10/2021 2003 by Tish Allen, RN  Medication Review/Management: medications reviewed  Self-Care Promotion:   independence encouraged   BADL  personal objects within reach   BADL personal routines maintained  Taken 2/10/2021 1900 by Tish Allen, RN  Medication Review/Management: medications reviewed  Intervention: Promote Injury-Free Environment  Recent Flowsheet Documentation  Taken 2/11/2021 0300 by Tish Allen, RN  Safety Promotion/Fall Prevention:   activity supervised   assistive device/personal items within reach   clutter free environment maintained   fall prevention program maintained   nonskid shoes/slippers when out of bed   room organization consistent   safety round/check completed  Taken 2/11/2021 0205 by Tish Allen, RN  Safety Promotion/Fall Prevention:   activity supervised   assistive device/personal items within reach   clutter free environment maintained   fall prevention program maintained   nonskid shoes/slippers when out of bed   room organization consistent   safety round/check completed  Taken 2/10/2021 2100 by Tihs Allen, RN  Safety Promotion/Fall Prevention:   activity supervised   assistive device/personal items within reach   clutter free environment maintained   fall prevention program maintained   nonskid shoes/slippers when out of bed   safety round/check completed   room organization consistent  Taken 2/10/2021 2003 by Tish Allen, RN  Safety Promotion/Fall Prevention:   activity supervised   clutter free environment maintained   assistive device/personal items within reach   fall prevention program maintained   nonskid shoes/slippers when out of bed   safety round/check completed   room organization consistent  Taken 2/10/2021 1900 by Tish Allen, RN  Safety Promotion/Fall Prevention:   activity supervised   assistive device/personal items within reach   clutter free environment maintained   fall prevention program maintained   nonskid shoes/slippers when out of bed   room organization consistent   safety round/check completed     Problem: Asthma Comorbidity  Goal: Maintenance of Asthma  Control  Outcome: Ongoing, Progressing  Intervention: Maintain Asthma Symptom Control  Recent Flowsheet Documentation  Taken 2/11/2021 0300 by Tish Allen RN  Medication Review/Management: medications reviewed  Taken 2/11/2021 0205 by Tish Allen RN  Medication Review/Management: medications reviewed  Taken 2/11/2021 0100 by Tish Allen RN  Medication Review/Management: medications reviewed  Taken 2/10/2021 2300 by Tish Allen RN  Medication Review/Management: medications reviewed  Taken 2/10/2021 2100 by Tish Allen RN  Medication Review/Management: medications reviewed  Taken 2/10/2021 2003 by Tish Allen RN  Medication Review/Management: medications reviewed  Taken 2/10/2021 1900 by Tish Allen RN  Medication Review/Management: medications reviewed     Problem: COPD Comorbidity  Goal: Maintenance of COPD Symptom Control  Outcome: Ongoing, Progressing  Intervention: Maintain COPD-Symptom Control  Recent Flowsheet Documentation  Taken 2/11/2021 0300 by Tish Allen RN  Medication Review/Management: medications reviewed  Taken 2/11/2021 0205 by Tish Allen RN  Medication Review/Management: medications reviewed  Taken 2/11/2021 0100 by Tish Allen RN  Medication Review/Management: medications reviewed  Taken 2/10/2021 2300 by Tish Allen RN  Medication Review/Management: medications reviewed  Taken 2/10/2021 2100 by Tish Allen RN  Medication Review/Management: medications reviewed  Taken 2/10/2021 2003 by Tish Allen RN  Medication Review/Management: medications reviewed  Taken 2/10/2021 1900 by Tish Allen RN  Medication Review/Management: medications reviewed     Problem: Diabetes Comorbidity  Goal: Blood Glucose Level Within Desired Range  Outcome: Ongoing, Progressing  Intervention: Maintain Glycemic Control  Recent Flowsheet Documentation  Taken 2/11/2021 0205 by Tish Allen RN  Glycemic Management:   blood glucose monitoring   oral  hydration promoted  Taken 2/10/2021 2003 by Tish Allen RN  Glycemic Management:   blood glucose monitoring   oral hydration promoted     Problem: Heart Failure Comorbidity  Goal: Maintenance of Heart Failure Symptom Control  Outcome: Ongoing, Progressing  Intervention: Maintain Heart Failure-Management Strategies  Recent Flowsheet Documentation  Taken 2/11/2021 0300 by Tish Allen RN  Medication Review/Management: medications reviewed  Taken 2/11/2021 0205 by Tish Allen RN  Medication Review/Management: medications reviewed  Taken 2/11/2021 0100 by Tish Allen RN  Medication Review/Management: medications reviewed  Taken 2/10/2021 2300 by Tish Allen RN  Medication Review/Management: medications reviewed  Taken 2/10/2021 2100 by Tish Allen RN  Medication Review/Management: medications reviewed  Taken 2/10/2021 2003 by Tish Allen RN  Medication Review/Management: medications reviewed  Taken 2/10/2021 1900 by Tish Allen RN  Medication Review/Management: medications reviewed     Problem: Hypertension Comorbidity  Goal: Blood Pressure in Desired Range  Outcome: Ongoing, Progressing  Intervention: Maintain Hypertension-Management Strategies  Recent Flowsheet Documentation  Taken 2/11/2021 0300 by Tish Allen RN  Medication Review/Management: medications reviewed  Taken 2/11/2021 0205 by Tish Allen RN  Medication Review/Management: medications reviewed  Taken 2/11/2021 0100 by Tish Allen RN  Medication Review/Management: medications reviewed  Taken 2/10/2021 2300 by Tish Allen RN  Medication Review/Management: medications reviewed  Taken 2/10/2021 2100 by Tish Allen RN  Medication Review/Management: medications reviewed  Taken 2/10/2021 2003 by Tish Allen, RN  Medication Review/Management: medications reviewed  Taken 2/10/2021 1900 by Tish Allen RN  Medication Review/Management: medications reviewed     Problem: Obstructive Sleep Apnea  Risk or Actual (Comorbidity Management)  Goal: Unobstructed Breathing During Sleep  Outcome: Ongoing, Progressing     Problem: Pain Chronic (Persistent) (Comorbidity Management)  Goal: Acceptable Pain Control and Functional Ability  Outcome: Ongoing, Progressing  Intervention: Develop Pain Management Plan  Recent Flowsheet Documentation  Taken 2/11/2021 0205 by Tish Allen RN  Pain Management Interventions:   care clustered   quiet environment facilitated   relaxation techniques promoted  Taken 2/10/2021 2003 by Tish Allen RN  Pain Management Interventions:   care clustered   quiet environment facilitated   relaxation techniques promoted  Intervention: Manage Persistent Pain  Recent Flowsheet Documentation  Taken 2/11/2021 0300 by Tish Allen RN  Medication Review/Management: medications reviewed  Taken 2/11/2021 0205 by Tish Allen RN  Bowel Elimination Promotion: privacy promoted  Sleep/Rest Enhancement: awakenings minimized  Medication Review/Management: medications reviewed  Taken 2/11/2021 0100 by Tish Allen RN  Medication Review/Management: medications reviewed  Taken 2/10/2021 2300 by Tish Allen RN  Medication Review/Management: medications reviewed  Taken 2/10/2021 2100 by Tish Allen RN  Medication Review/Management: medications reviewed  Taken 2/10/2021 2003 by Tish Allen RN  Bowel Elimination Promotion: privacy promoted  Sleep/Rest Enhancement:   awakenings minimized   consistent schedule promoted   relaxation techniques promoted   room darkened  Medication Review/Management: medications reviewed  Taken 2/10/2021 1900 by Tish Allen, RN  Medication Review/Management: medications reviewed  Intervention: Optimize Psychosocial Wellbeing  Recent Flowsheet Documentation  Taken 2/11/2021 0205 by Tish Allen RN  Supportive Measures:   active listening utilized   relaxation techniques promoted  Diversional Activities:   television   smartphone  Family/Support  System Care:   support provided   self-care encouraged  Taken 2/10/2021 2003 by Tish Allen RN  Supportive Measures:   active listening utilized   relaxation techniques promoted   self-care encouraged  Diversional Activities:   television   smartphone  Family/Support System Care:   support provided   self-care encouraged     Problem: Seizure Disorder Comorbidity  Goal: Maintenance of Seizure Control  Outcome: Ongoing, Progressing  Intervention: Maintain Seizure-Symptom Control  Recent Flowsheet Documentation  Taken 2/11/2021 0300 by Tish Allen RN  Seizure Precautions:   activity supervised   emergency equipment at bedside   clutter-free environment maintained  Taken 2/11/2021 0205 by Tish Allen RN  Seizure Precautions:   activity supervised   clutter-free environment maintained   emergency equipment at bedside  Taken 2/11/2021 0100 by Tish Allen RN  Seizure Precautions:   clutter-free environment maintained   activity supervised   emergency equipment at bedside  Taken 2/10/2021 2300 by Tish Allen RN  Seizure Precautions: activity supervised  Taken 2/10/2021 2100 by Tish Allen RN  Seizure Precautions: activity supervised  Taken 2/10/2021 2003 by Tish Allen RN  Seizure Precautions:   activity supervised   clutter-free environment maintained   emergency equipment at bedside  Taken 2/10/2021 1900 by Tish Allen RN  Seizure Precautions:   clutter-free environment maintained   activity supervised   emergency equipment at bedside   Goal Outcome Evaluation:  Plan of Care Reviewed With: patient  Progress: improving  Outcome Summary: PT resting in bed at this time. PT is on room air with 2L NC PRN if needed for sleep apnea.  Wound Care provided. Colostomy bag changed, and site cleaned. VSS. Afebrile. No other complaints or discomfort noted.

## 2021-02-12 ENCOUNTER — APPOINTMENT (OUTPATIENT)
Dept: GENERAL RADIOLOGY | Facility: HOSPITAL | Age: 46
End: 2021-02-12

## 2021-02-12 LAB
ANION GAP SERPL CALCULATED.3IONS-SCNC: 10.1 MMOL/L (ref 5–15)
ANISOCYTOSIS BLD QL: NORMAL
BACTERIA SPEC AEROBE CULT: NORMAL
BACTERIA SPEC AEROBE CULT: NORMAL
BASOPHILS # BLD AUTO: 0.04 10*3/MM3 (ref 0–0.2)
BASOPHILS NFR BLD AUTO: 0.4 % (ref 0–1.5)
BUN SERPL-MCNC: 16 MG/DL (ref 6–20)
BUN/CREAT SERPL: 21.6 (ref 7–25)
CALCIUM SPEC-SCNC: 9.4 MG/DL (ref 8.6–10.5)
CHLORIDE SERPL-SCNC: 103 MMOL/L (ref 98–107)
CO2 SERPL-SCNC: 24.9 MMOL/L (ref 22–29)
CREAT SERPL-MCNC: 0.74 MG/DL (ref 0.76–1.27)
CRP SERPL-MCNC: 3.18 MG/DL (ref 0–0.5)
DEPRECATED RDW RBC AUTO: 46.6 FL (ref 37–54)
EOSINOPHIL # BLD AUTO: 0.19 10*3/MM3 (ref 0–0.4)
EOSINOPHIL NFR BLD AUTO: 2.1 % (ref 0.3–6.2)
ERYTHROCYTE [DISTWIDTH] IN BLOOD BY AUTOMATED COUNT: 16.2 % (ref 12.3–15.4)
GFR SERPL CREATININE-BSD FRML MDRD: 115 ML/MIN/1.73
GLUCOSE BLDC GLUCOMTR-MCNC: 278 MG/DL (ref 70–130)
GLUCOSE BLDC GLUCOMTR-MCNC: 289 MG/DL (ref 70–130)
GLUCOSE BLDC GLUCOMTR-MCNC: 319 MG/DL (ref 70–130)
GLUCOSE BLDC GLUCOMTR-MCNC: 323 MG/DL (ref 70–130)
GLUCOSE BLDC GLUCOMTR-MCNC: 343 MG/DL (ref 70–130)
GLUCOSE SERPL-MCNC: 313 MG/DL (ref 65–99)
HCT VFR BLD AUTO: 37.5 % (ref 37.5–51)
HGB BLD-MCNC: 10.5 G/DL (ref 13–17.7)
HYPOCHROMIA BLD QL: NORMAL
IMM GRANULOCYTES # BLD AUTO: 0.05 10*3/MM3 (ref 0–0.05)
IMM GRANULOCYTES NFR BLD AUTO: 0.5 % (ref 0–0.5)
LYMPHOCYTES # BLD AUTO: 2.26 10*3/MM3 (ref 0.7–3.1)
LYMPHOCYTES NFR BLD AUTO: 24.8 % (ref 19.6–45.3)
MCH RBC QN AUTO: 22.4 PG (ref 26.6–33)
MCHC RBC AUTO-ENTMCNC: 28 G/DL (ref 31.5–35.7)
MCV RBC AUTO: 80 FL (ref 79–97)
MONOCYTES # BLD AUTO: 0.4 10*3/MM3 (ref 0.1–0.9)
MONOCYTES NFR BLD AUTO: 4.4 % (ref 5–12)
NEUTROPHILS NFR BLD AUTO: 6.17 10*3/MM3 (ref 1.7–7)
NEUTROPHILS NFR BLD AUTO: 67.8 % (ref 42.7–76)
NRBC BLD AUTO-RTO: 0 /100 WBC (ref 0–0.2)
PLAT MORPH BLD: NORMAL
PLATELET # BLD AUTO: 396 10*3/MM3 (ref 140–450)
PMV BLD AUTO: 10.6 FL (ref 6–12)
POTASSIUM SERPL-SCNC: 4.2 MMOL/L (ref 3.5–5.2)
RBC # BLD AUTO: 4.69 10*6/MM3 (ref 4.14–5.8)
SODIUM SERPL-SCNC: 138 MMOL/L (ref 136–145)
WBC # BLD AUTO: 9.11 10*3/MM3 (ref 3.4–10.8)

## 2021-02-12 PROCEDURE — 85025 COMPLETE CBC W/AUTO DIFF WBC: CPT | Performed by: INTERNAL MEDICINE

## 2021-02-12 PROCEDURE — 94799 UNLISTED PULMONARY SVC/PX: CPT

## 2021-02-12 PROCEDURE — 94660 CPAP INITIATION&MGMT: CPT

## 2021-02-12 PROCEDURE — 63710000001 INSULIN ASPART PER 5 UNITS: Performed by: INTERNAL MEDICINE

## 2021-02-12 PROCEDURE — 80048 BASIC METABOLIC PNL TOTAL CA: CPT | Performed by: INTERNAL MEDICINE

## 2021-02-12 PROCEDURE — 85007 BL SMEAR W/DIFF WBC COUNT: CPT | Performed by: INTERNAL MEDICINE

## 2021-02-12 PROCEDURE — 73552 X-RAY EXAM OF FEMUR 2/>: CPT | Performed by: RADIOLOGY

## 2021-02-12 PROCEDURE — 05HY33Z INSERTION OF INFUSION DEVICE INTO UPPER VEIN, PERCUTANEOUS APPROACH: ICD-10-PCS | Performed by: INTERNAL MEDICINE

## 2021-02-12 PROCEDURE — 25010000002 VANCOMYCIN: Performed by: INTERNAL MEDICINE

## 2021-02-12 PROCEDURE — 25010000002 CEFTRIAXONE PER 250 MG: Performed by: INTERNAL MEDICINE

## 2021-02-12 PROCEDURE — 72170 X-RAY EXAM OF PELVIS: CPT

## 2021-02-12 PROCEDURE — 82962 GLUCOSE BLOOD TEST: CPT

## 2021-02-12 PROCEDURE — C1751 CATH, INF, PER/CENT/MIDLINE: HCPCS

## 2021-02-12 PROCEDURE — 36410 VNPNXR 3YR/> PHY/QHP DX/THER: CPT

## 2021-02-12 PROCEDURE — 73552 X-RAY EXAM OF FEMUR 2/>: CPT

## 2021-02-12 PROCEDURE — 86140 C-REACTIVE PROTEIN: CPT | Performed by: NURSE PRACTITIONER

## 2021-02-12 PROCEDURE — 99233 SBSQ HOSP IP/OBS HIGH 50: CPT | Performed by: INTERNAL MEDICINE

## 2021-02-12 PROCEDURE — 72170 X-RAY EXAM OF PELVIS: CPT | Performed by: RADIOLOGY

## 2021-02-12 RX ORDER — HYDRALAZINE HYDROCHLORIDE 20 MG/ML
10 INJECTION INTRAMUSCULAR; INTRAVENOUS EVERY 6 HOURS PRN
Status: DISCONTINUED | OUTPATIENT
Start: 2021-02-12 | End: 2021-02-18 | Stop reason: HOSPADM

## 2021-02-12 RX ORDER — DEXTROSE MONOHYDRATE 25 G/50ML
25 INJECTION, SOLUTION INTRAVENOUS
Status: DISCONTINUED | OUTPATIENT
Start: 2021-02-12 | End: 2021-02-18 | Stop reason: HOSPADM

## 2021-02-12 RX ORDER — NICOTINE POLACRILEX 4 MG
15 LOZENGE BUCCAL
Status: DISCONTINUED | OUTPATIENT
Start: 2021-02-12 | End: 2021-02-18 | Stop reason: HOSPADM

## 2021-02-12 RX ADMIN — INSULIN ASPART 7 UNITS: 100 INJECTION, SOLUTION INTRAVENOUS; SUBCUTANEOUS at 08:10

## 2021-02-12 RX ADMIN — GABAPENTIN 400 MG: 400 CAPSULE ORAL at 06:29

## 2021-02-12 RX ADMIN — BACLOFEN 30 MG: 10 TABLET ORAL at 20:59

## 2021-02-12 RX ADMIN — APIXABAN 5 MG: 5 TABLET, FILM COATED ORAL at 21:00

## 2021-02-12 RX ADMIN — BACLOFEN 30 MG: 10 TABLET ORAL at 17:08

## 2021-02-12 RX ADMIN — FAMOTIDINE 20 MG: 10 INJECTION INTRAVENOUS at 08:11

## 2021-02-12 RX ADMIN — ATORVASTATIN CALCIUM 10 MG: 10 TABLET, FILM COATED ORAL at 21:00

## 2021-02-12 RX ADMIN — Medication: at 08:32

## 2021-02-12 RX ADMIN — GABAPENTIN 400 MG: 400 CAPSULE ORAL at 21:00

## 2021-02-12 RX ADMIN — CEFTRIAXONE 2 G: 2 INJECTION, POWDER, FOR SOLUTION INTRAMUSCULAR; INTRAVENOUS at 10:40

## 2021-02-12 RX ADMIN — Medication: at 17:09

## 2021-02-12 RX ADMIN — SODIUM CHLORIDE, PRESERVATIVE FREE 10 ML: 5 INJECTION INTRAVENOUS at 21:00

## 2021-02-12 RX ADMIN — INSULIN ASPART 10 UNITS: 100 INJECTION, SOLUTION INTRAVENOUS; SUBCUTANEOUS at 11:11

## 2021-02-12 RX ADMIN — DULOXETINE HYDROCHLORIDE 60 MG: 60 CAPSULE, DELAYED RELEASE ORAL at 08:10

## 2021-02-12 RX ADMIN — Medication 1 CAPSULE: at 08:10

## 2021-02-12 RX ADMIN — SODIUM CHLORIDE, PRESERVATIVE FREE 10 ML: 5 INJECTION INTRAVENOUS at 08:32

## 2021-02-12 RX ADMIN — VANCOMYCIN HYDROCHLORIDE 2000 MG: 1 INJECTION, POWDER, LYOPHILIZED, FOR SOLUTION INTRAVENOUS at 13:34

## 2021-02-12 RX ADMIN — INSULIN ASPART 8 UNITS: 100 INJECTION, SOLUTION INTRAVENOUS; SUBCUTANEOUS at 16:57

## 2021-02-12 RX ADMIN — ARIPIPRAZOLE 10 MG: 10 TABLET ORAL at 21:00

## 2021-02-12 RX ADMIN — GABAPENTIN 400 MG: 400 CAPSULE ORAL at 14:03

## 2021-02-12 RX ADMIN — Medication: at 14:03

## 2021-02-12 RX ADMIN — APIXABAN 5 MG: 5 TABLET, FILM COATED ORAL at 08:10

## 2021-02-12 RX ADMIN — DULOXETINE HYDROCHLORIDE 60 MG: 60 CAPSULE, DELAYED RELEASE ORAL at 21:00

## 2021-02-12 RX ADMIN — BACLOFEN 30 MG: 10 TABLET ORAL at 08:10

## 2021-02-12 RX ADMIN — CHOLECALCIFEROL TAB 10 MCG (400 UNIT) 2000 UNITS: 10 TAB at 08:10

## 2021-02-12 NOTE — PROGRESS NOTES
PROGRESS NOTE         Patient Identification:  Name:  Ken Krueger  Age:  45 y.o.  Sex:  male  :  1975  MRN:  [id number]  Visit Number:  [id number]  Primary Care Provider:  Provider, No Known         LOS: 5 days       ----------------------------------------------------------------------------------------------------------------------  Subjective       Chief Complaints:    Vomiting, Nausea, and Diarrhea        Interval History:      Patient is sitting up in bed this morning on 2 L nasal cannula in no apparent distress.  Denies any new complaints at this time.  Afebrile.  No increased output from ostomy.  CRP is improving at 3.18.  WBC remains normal.  MRI of pelvis with and without contrast on 2021 showed old fracture of the proximal right femur.  There is surrounding fluid and minimal increased signal in the proximal right femur.  This would support a diagnosis of osteomyelitis.  Minimal fluid in left hip as well.  Wound culture of the buttock on 2021 is so far showing scant growth of gram-negative bacilli.    Review of Systems:    Constitutional: no fever, chills and night sweats. No appetite change or unexpected weight change. No fatigue.  Eyes: no eye drainage, itching or redness.  HEENT: no mouth sores, dysphagia or nose bleed.  Respiratory: no for shortness of breath, cough or production of sputum.  Cardiovascular: no chest pain, no palpitations, no orthopnea.  Gastrointestinal: no nausea, vomiting or diarrhea. No abdominal pain, hematemesis or rectal bleeding.  Genitourinary: no dysuria or polyuria.  Hematologic/lymphatic: no lymph node abnormalities, no easy bruising or easy bleeding.  Musculoskeletal: no muscle or joint pain.  Skin: No rash and no itching.  Neurological: no loss of consciousness, no seizure, no headache.  Behavioral/Psych: no depression or suicidal ideation.  Endocrine: no hot flashes.  Immunologic:  negative.    ----------------------------------------------------------------------------------------------------------------------      Objective       [facility] Meds:  apixaban, 5 mg, Oral, Q12H  ARIPiprazole, 10 mg, Oral, Nightly  atorvastatin, 10 mg, Oral, Nightly  baclofen, 30 mg, Oral, 4x Daily With Meals & Nightly  cefTRIAXone, 2 g, Intravenous, Q24H  cholecalciferol, 2,000 Units, Oral, Daily  DULoxetine, 60 mg, Oral, BID  famotidine, 20 mg, Intravenous, Q12H  gabapentin, 400 mg, Oral, Q8H  insulin aspart, 0-14 Units, Subcutaneous, TID AC  lactobacillus acidophilus, 1 capsule, Oral, Daily  magic barrier cream, , Topical, 4x Daily  sodium chloride, 10 mL, Intravenous, Q12H  sodium hypochlorite, , Topical, BID  [START ON 2/13/2021] vancomycin, 1,500 mg, Intravenous, Q12H  vancomycin, 2,000 mg, Intravenous, Once      Pharmacy to dose vancomycin,       ----------------------------------------------------------------------------------------------------------------------    Vital Signs:  Temp:  [97.8 °F (36.6 °C)-98.4 °F (36.9 °C)] 98 °F (36.7 °C)  Heart Rate:  [73-98] 73  Resp:  [12-21] 18  BP: (120-165)/() 154/89  Mean Arterial Pressure (Non-Invasive) for the past 24 hrs (Last 3 readings):   Noninvasive MAP (mmHg)   02/12/21 1102 112   02/12/21 1043 116   02/12/21 0902 126     SpO2 Percentage    02/12/21 0902 02/12/21 1043 02/12/21 1102   SpO2: 98% 99% 100%     SpO2:  [90 %-100 %] 100 %  on  Flow (L/min):  [2] 2;   Device (Oxygen Therapy): room air    Body mass index is 38.93 kg/m².  Wt Readings from Last 3 Encounters:   02/12/21 127 kg (279 lb 1.6 oz)   06/02/20 (!) 150 kg (330 lb)   05/04/20 (!) 150 kg (330 lb)        Intake/Output Summary (Last 24 hours) at 2/12/2021 1300  Last data filed at 2/12/2021 1040  Gross per 24 hour   Intake 460 ml   Output 3165 ml   Net -2705 ml     Diet Regular; Consistent  Carbohydrate  ----------------------------------------------------------------------------------------------------------------------      Physical Exam:    Constitutional:  Well-developed and well-nourished.  No respiratory distress.      HENT:  Head: Normocephalic and atraumatic.  Mouth:  Moist mucous membranes.    Eyes:  Conjunctivae and EOM are normal.  No scleral icterus.  Neck:  Neck supple.  No JVD present.    Cardiovascular:  Normal rate, regular rhythm and normal heart sounds with no murmur. No edema.  Pulmonary/Chest:  No respiratory distress, no wheezes, no crackles, with normal breath sounds and good air movement.  Abdominal:  Soft.  Bowel sounds are normal.  Positive distention, no tenderness.  Colostomy and urostomy.  Musculoskeletal: Left AKA, right leg atrophy.  Neurological:  Alert and oriented to person, place, and time.  No facial droop.  No slurred speech.   Skin:   Decubitus ulcers.  Psychiatric:  Normal mood and affect.  Behavior is normal.    ----------------------------------------------------------------------------------------------------------------------  Results from last 7 days   Lab Units 02/07/21  1356   TROPONIN T ng/mL 0.029           Results from last 7 days   Lab Units 02/12/21  0053 02/11/21  0051 02/10/21  0033  02/08/21  0903  02/07/21 1952 02/07/21  1356   CRP mg/dL 3.18* 3.50* 5.83*  --   --    < >  --  6.41*   LACTATE mmol/L  --   --   --   --  1.7  --  2.1* 3.4*   WBC 10*3/mm3 9.11 9.01 8.06   < > 9.64  --   --  17.88*   HEMOGLOBIN g/dL 10.5* 10.6* 10.3*   < > 10.5*  --   --  13.2   HEMATOCRIT % 37.5 38.4 38.1   < > 38.1  --   --  45.1   MCV fL 80.0 80.5 81.6   < > 81.2  --   --  77.2*   MCHC g/dL 28.0* 27.6* 27.0*   < > 27.6*  --   --  29.3*   PLATELETS 10*3/mm3 396 393 404   < > 459*  --   --  732*    < > = values in this interval not displayed.     Results from last 7 days   Lab Units 02/12/21  0053 02/11/21  0051 02/10/21  1720 02/10/21  0033  02/09/21  0352  02/08/21  0902 02/07/21  1356   SODIUM mmol/L 138 128*  --  137  --  139 139 130*   POTASSIUM mmol/L 4.2 3.8 4.0 3.5   < > 3.2* 4.2 3.6   MAGNESIUM mg/dL  --   --   --   --   --   --  2.1 1.6   CHLORIDE mmol/L 103 95*  --  104  --  107 109* 92*   CO2 mmol/L 24.9 23.0  --  22.3  --  21.2* 19.3* 22.0   BUN mg/dL 16 13  --  15  --  22* 48* 59*   CREATININE mg/dL 0.74* 0.62*  --  0.82  --  0.66* 1.13 1.91*   EGFR IF NONAFRICN AM mL/min/1.73 115 142  --  103  --  132 71 39*   CALCIUM mg/dL 9.4 9.9  --  9.0  --  8.0* 7.8* 9.6   GLUCOSE mg/dL 313* 235*  --  211*  --  163* 181* 327*   ALBUMIN g/dL  --   --   --   --   --  2.88* 3.06* 4.02   BILIRUBIN mg/dL  --   --   --   --   --  0.3 0.4 0.5   ALK PHOS U/L  --   --   --   --   --  79 86 114   AST (SGOT) U/L  --   --   --   --   --  25 27 24   ALT (SGPT) U/L  --   --   --   --   --  22 27 40    < > = values in this interval not displayed.   Estimated Creatinine Clearance: 174.8 mL/min (A) (by C-G formula based on SCr of 0.74 mg/dL (L)).  No results found for: AMMONIA    Glucose   Date/Time Value Ref Range Status   02/12/2021 1037 343 (H) 70 - 130 mg/dL Final   02/12/2021 0628 319 (H) 70 - 130 mg/dL Final   02/12/2021 0000 323 (H) 70 - 130 mg/dL Final   02/11/2021 1956 266 (H) 70 - 130 mg/dL Final   02/11/2021 1701 300 (H) 70 - 130 mg/dL Final   02/11/2021 1106 282 (H) 70 - 130 mg/dL Final   02/11/2021 0510 255 (H) 70 - 130 mg/dL Final   02/11/2021 0011 211 (H) 70 - 130 mg/dL Final     Lab Results   Component Value Date    HGBA1C 8.90 (H) 10/19/2019     Lab Results   Component Value Date    TSH 1.160 02/07/2021       Blood Culture   Date Value Ref Range Status   02/07/2021 No growth at 3 days  Preliminary   02/07/2021 No growth at 3 days  Preliminary     Urine Culture   Date Value Ref Range Status   02/07/2021 (A)  Final    >100,000 CFU/mL Klebsiella pneumoniae ssp pneumoniae     No results found for: WOUNDCX  No results found for: STOOLCX  No results found for: RESPCX  Pain  Management Panel     Pain Management Panel Latest Ref Rng & Units 8/19/2018 12/5/2017    AMPHETAMINES SCREEN, URINE Negative Negative Negative    BARBITURATES SCREEN Negative Negative Negative    BENZODIAZEPINE SCREEN, URINE Negative Negative Negative    BUPRENORPHINEUR Negative Negative Negative    COCAINE SCREEN, URINE Negative Negative Negative    METHADONE SCREEN, URINE Negative Negative Negative            ----------------------------------------------------------------------------------------------------------------------  Imaging Results (Last 24 Hours)     Procedure Component Value Units Date/Time    MRI Pelvis With & Without Contrast [[id number]] Collected: 02/11/21 1322     Updated: 02/11/21 1331    Narrative:      EXAMINATION: MRI PELVIS W WO CONTRAST-      CLINICAL INDICATION: Osteomyelitis suspected, pelvis, xray done;  A41.9-Sepsis, unspecified organism; R11.2-Nausea with vomiting,  unspecified; R19.7-Diarrhea, unspecified        COMPARISON: 07/31/2017 pelvic MRI.     PROCEDURE: Multiple planar images of the pelvis were acquired in a high  field strength magnet. Several pulse sequences were used to acquire the  study. Images were acquired before and after gadolinium administration.     FINDINGS:     There is evidence of proximal right femoral fracture. There is an  effusion. Also there is minimal increased signal in the proximal right  femur. This would support a diagnosis of osteomyelitis. It does have a  very similar appearance to the prior study.     Trace fluid is seen in the left hip.       Impression:      1. Old fracture of the proximal right femur. There is surrounding fluid  and minimal increased signal in the proximal right femur. This would  support a diagnosis of osteomyelitis.  2. Minimal fluid in the left hip as well.      This report was finalized on 2/11/2021 1:29 PM by Dr. Hernesot Alaniz MD.              ----------------------------------------------------------------------------------------------------------------------    Assessment/Plan       Assessment/Plan     ASSESSMENT:    1.  Severe sepsis with lactic acid greater than 2 on admission  2.  UTI    PLAN:    Patient is sitting up in bed this morning on 2 L nasal cannula in no apparent distress.  Denies any new complaints at this time.  Afebrile.  No increased output from ostomy.  CRP is improving at 3.18.  WBC remains normal.  MRI of pelvis with and without contrast on 2/11/2021 showed old fracture of the proximal right femur.  There is surrounding fluid and minimal increased signal in the proximal right femur.  This would support a diagnosis of osteomyelitis.  Minimal fluid in left hip as well.  Wound culture of the buttock on 2/11/2021 is so far showing scant growth of gram-negative bacilli.    COVID-19 PCR negative.  Blood culture showed no growth thus far.  Lactic acid 3.4 on admission.  GI panel negative.  Urinalysis positive with urine culture finalizing as Klebsiella pneumoniae.  Chest x-ray and abdominal x-ray unremarkable.  ET of the abdomen and pelvis from 2/7/2021 reports multiple dilated loops of small bowel that may represent potential small bowel obstruction versus ileus.     Recommend to continue on Rocephin 2 g IV every 24 hours for UTI as well as osteomyelitis.    We will have to treat as osteomyelitis as the patient's MRI is showing evidence concerning for either old fracture and osteomyelitis or pathologic fracture with osteomyelitis.  We will add vancomycin pharmacy to dose empirically until wound culture results finalize.  Recommend orthopedic evaluation based on concerning MRI.    Code Status:   Code Status and Medical Interventions:   Ordered at: 02/07/21 1753     Code Status:    CPR     Medical Interventions (Level of Support Prior to Arrest):    Full       KAYLAH Rhodes  02/12/21  13:00 EST
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
## 2021-02-12 NOTE — PROGRESS NOTES
[facility] HOSPITALIST PROGRESS NOTE     Patient Identification:  Name:  Ken Krueger  Age:  45 y.o.  Sex:  male  :  1975  MRN:  [id number]  Visit Number:  [id number]  ROOM: [address]     Primary Care Provider:  Provider, No Known    Length of stay in inpatient status:  4    Subjective     Chief Compliant:    Chief Complaint   Patient presents with   • Vomiting   • Nausea   • Diarrhea       History of Presenting Illness:    Patient reports feeling well. Denies any new complaints. He had desaturation episode while he slept but quickly came back up after waking up. He noted that happened often when he didn't wear his CPAP.     After examining patient's decubitus ulcers, he reports prior to presentation he started having foul smelling purulent drainage from decubitus ulcers. He reports his brother helps him care for them at home.     ROS:  Otherwise 10 point ROS negative other than documented above in HPI.     Objective     Current Hospital Meds:apixaban, 5 mg, Oral, Q12H  ARIPiprazole, 10 mg, Oral, Nightly  atorvastatin, 10 mg, Oral, Nightly  baclofen, 30 mg, Oral, 4x Daily With Meals & Nightly  cefTRIAXone, 2 g, Intravenous, Q24H  cholecalciferol, 2,000 Units, Oral, Daily  DULoxetine, 60 mg, Oral, BID  famotidine, 20 mg, Intravenous, Q12H  gabapentin, 400 mg, Oral, Q8H  insulin aspart, 0-9 Units, Subcutaneous, TID AC  lactobacillus acidophilus, 1 capsule, Oral, Daily  magic barrier cream, , Topical, 4x Daily  sodium chloride, 10 mL, Intravenous, Q12H  sodium hypochlorite, , Topical, BID         Current Antimicrobial Therapy:  Anti-Infectives (From admission, onward)    Ordered     Dose/Rate Route Frequency Start Stop    02/10/21 1014  cefTRIAXone (ROCEPHIN) 2 g/100 mL 0.9% NS IVPB (MBP)     Ordering Provider: Tra Jain MD    2 g  over 30 Minutes Intravenous Every 24 Hours 02/10/21 1100 21 1059    21 1555  vancomycin 2000 mg/500 mL 0.9% NS IVPB ([facility])     Ordering Provider:  Bryce Farrar PA    2,000 mg  over 120 Minutes Intravenous Once 02/07/21 1630 02/07/21 2021 02/07/21 1547  aztreonam (AZACTAM) 2 g/100 mL 0.9% NS (mbp)     Ordering Provider: Bryce Farrar PA    2 g  over 30 Minutes Intravenous Once 02/07/21 1549 02/07/21 1800        Current Diuretic Therapy:  Diuretics (From admission, onward)    None        ----------------------------------------------------------------------------------------------------------------------  Vital Signs:  Temp:  [98 °F (36.7 °C)-98.4 °F (36.9 °C)] 98.1 °F (36.7 °C)  Heart Rate:  [71-98] 81  Resp:  [12-21] 20  BP: (120-161)/() 146/88  SpO2:  [92 %-100 %] 96 %  on  Flow (L/min):  [2] 2;   Device (Oxygen Therapy): room air  Body mass index is 38.77 kg/m².    Wt Readings from Last 3 Encounters:   02/11/21 126 kg (278 lb)   06/02/20 (!) 150 kg (330 lb)   05/04/20 (!) 150 kg (330 lb)     Intake & Output (last 3 days)       02/09 0701 - 02/10 0700 02/10 0701 - 02/11 0700 02/11 0701 - 02/12 0700    P.O. 485 2035 840    I.V. (mL/kg) 1868.2 (15.2) 205 (1.6)     IV Piggyback  97 100    Total Intake(mL/kg) 2353.2 (19.1) 2337 (18.5) 940 (7.5)    Urine (mL/kg/hr) 2775 (0.9) 2775 (0.9) 1225 (0.7)    Stool 700 600     Total Output 0328 6026 2425    Net -1121.9 -1038.1 -285               Diet Regular; Consistent Carbohydrate  ----------------------------------------------------------------------------------------------------------------------  Physical exam:  Constitutional:  Well-developed and well-nourished.  No respiratory distress.      HENT:  Head:  Normocephalic and atraumatic.  Mouth:  Moist mucous membranes.    Eyes:  Conjunctivae and EOM are normal. No scleral icterus.    Neck:  Neck supple.  No JVD present.    Cardiovascular:  Normal rate, regular rhythm and normal heart sounds with no murmur.  Pulmonary/Chest:  No respiratory distress, no wheezes, no crackles, with normal breath sounds and good air movement.  Abdominal:  Soft.  Bowel  sounds are normal.  No distension and no tenderness.   Musculoskeletal:  No edema, no tenderness, and no deformity.  No red or swollen joints anywhere.    Neurological:  Alert and oriented to person, place, and time.  No cranial nerve deficit.  No tongue deviation.  No facial droop.  No slurred speech.   Skin:  Skin is warm and dry. No rash noted. No pallor.   Peripheral vascular:  Pulses in all 4 extremities with no clubbing, no cyanosis, no edema.  ----------------------------------------------------------------------------------------------------------------------  Tele:    ----------------------------------------------------------------------------------------------------------------------  Results from last 7 days   Lab Units 02/11/21  0051 02/10/21  0033 02/09/21  0352 02/08/21  0903 02/08/21  0902 02/07/21  1952 02/07/21  1356   CRP mg/dL 3.50* 5.83*  --   --  8.79*  --  6.41*   LACTATE mmol/L  --   --   --  1.7  --  2.1* 3.4*   WBC 10*3/mm3 9.01 8.06 7.19 9.64  --   --  17.88*   HEMOGLOBIN g/dL 10.6* 10.3* 9.9* 10.5*  --   --  13.2   HEMATOCRIT % 38.4 38.1 35.2* 38.1  --   --  45.1   MCV fL 80.5 81.6 79.8 81.2  --   --  77.2*   MCHC g/dL 27.6* 27.0* 28.1* 27.6*  --   --  29.3*   PLATELETS 10*3/mm3 393 404 409 459*  --   --  732*         Results from last 7 days   Lab Units 02/11/21  0051 02/10/21  1720 02/10/21  0033  02/09/21  0352 02/08/21  0902 02/07/21  1356   SODIUM mmol/L 128*  --  137  --  139 139 130*   POTASSIUM mmol/L 3.8 4.0 3.5   < > 3.2* 4.2 3.6   MAGNESIUM mg/dL  --   --   --   --   --  2.1 1.6   CHLORIDE mmol/L 95*  --  104  --  107 109* 92*   CO2 mmol/L 23.0  --  22.3  --  21.2* 19.3* 22.0   BUN mg/dL 13  --  15  --  22* 48* 59*   CREATININE mg/dL 0.62*  --  0.82  --  0.66* 1.13 1.91*   EGFR IF NONAFRICN AM mL/min/1.73 142  --  103  --  132 71 39*   CALCIUM mg/dL 9.9  --  9.0  --  8.0* 7.8* 9.6   PHOSPHORUS mg/dL  --   --   --   --   --  3.0  --    GLUCOSE mg/dL 235*  --  211*  --  163* 181*  327*   ALBUMIN g/dL  --   --   --   --  2.88* 3.06* 4.02   BILIRUBIN mg/dL  --   --   --   --  0.3 0.4 0.5   ALK PHOS U/L  --   --   --   --  79 86 114   AST (SGOT) U/L  --   --   --   --  25 27 24   ALT (SGPT) U/L  --   --   --   --  22 27 40    < > = values in this interval not displayed.   Estimated Creatinine Clearance: 207.7 mL/min (A) (by C-G formula based on SCr of 0.62 mg/dL (L)).  No results found for: AMMONIA  Results from last 7 days   Lab Units 02/07/21  1356   TROPONIN T ng/mL 0.029             Glucose   Date/Time Value Ref Range Status   02/11/2021 1701 300 (H) 70 - 130 mg/dL Final   02/11/2021 1106 282 (H) 70 - 130 mg/dL Final   02/11/2021 0510 255 (H) 70 - 130 mg/dL Final   02/11/2021 0011 211 (H) 70 - 130 mg/dL Final   02/10/2021 2241 202 (H) 70 - 130 mg/dL Final   02/10/2021 1710 220 (H) 70 - 130 mg/dL Final   02/10/2021 1107 247 (H) 70 - 130 mg/dL Final   02/10/2021 0632 192 (H) 70 - 130 mg/dL Final     Lab Results   Component Value Date    TSH 1.160 02/07/2021     No results found for: PREGTESTUR, PREGSERUM, HCG, HCGQUANT  Pain Management Panel     Pain Management Panel Latest Ref Rng & Units 8/19/2018 12/5/2017    AMPHETAMINES SCREEN, URINE Negative Negative Negative    BARBITURATES SCREEN Negative Negative Negative    BENZODIAZEPINE SCREEN, URINE Negative Negative Negative    BUPRENORPHINEUR Negative Negative Negative    COCAINE SCREEN, URINE Negative Negative Negative    METHADONE SCREEN, URINE Negative Negative Negative        Brief Urine Lab Results  (Last result in the past 365 days)      Color   Clarity   Blood   Leuk Est   Nitrite   Protein   CREAT   Urine HCG        02/07/21 1826 Dark Yellow Cloudy Negative Large (3+) Negative 100 mg/dL (2+)             Blood Culture   Date Value Ref Range Status   02/07/2021 No growth at 4 days  Preliminary   02/07/2021 No growth at 4 days  Preliminary     Urine Culture   Date Value Ref Range Status   02/07/2021 (A)  Final    >100,000 CFU/mL Klebsiella  pneumoniae ssp pneumoniae     No results found for: WOUNDCX  No results found for: STOOLCX  No results found for: RESPCX  No results found for: AFBCX  Results from last 7 days   Lab Units 02/11/21  0051 02/10/21  0033 02/08/21  0903 02/08/21  0902 02/07/21  1952 02/07/21  1356   LACTATE mmol/L  --   --  1.7  --  2.1* 3.4*   SED RATE mm/hr  --   --   --   --   --  7   CRP mg/dL 3.50* 5.83*  --  8.79*  --  6.41*       I have personally looked at the labs and they are summarized above.  ----------------------------------------------------------------------------------------------------------------------  Detailed radiology reports for the last 24 hours:    Imaging Results (Last 24 Hours)     Procedure Component Value Units Date/Time    MRI Pelvis With & Without Contrast [[id number]] Collected: 02/11/21 1322     Updated: 02/11/21 1331    Narrative:      EXAMINATION: MRI PELVIS W WO CONTRAST-      CLINICAL INDICATION: Osteomyelitis suspected, pelvis, xray done;  A41.9-Sepsis, unspecified organism; R11.2-Nausea with vomiting,  unspecified; R19.7-Diarrhea, unspecified        COMPARISON: 07/31/2017 pelvic MRI.     PROCEDURE: Multiple planar images of the pelvis were acquired in a high  field strength magnet. Several pulse sequences were used to acquire the  study. Images were acquired before and after gadolinium administration.     FINDINGS:     There is evidence of proximal right femoral fracture. There is an  effusion. Also there is minimal increased signal in the proximal right  femur. This would support a diagnosis of osteomyelitis. It does have a  very similar appearance to the prior study.     Trace fluid is seen in the left hip.       Impression:      1. Old fracture of the proximal right femur. There is surrounding fluid  and minimal increased signal in the proximal right femur. This would  support a diagnosis of osteomyelitis.  2. Minimal fluid in the left hip as well.      This report was finalized on 2/11/2021  1:29 PM by Dr. Hernesto Alaniz MD.           Assessment & Plan    #Septic shock 2/2 UTI  - Urine culture growing Klebsiella pneumonia. Sensitivities reviewed. ID consulted.   - Abx transitioned to rocephin. Will monitor   - GI PCR negative.     #Ileus vs. Possible small bowel obstruction   - CT the abdomen and pelvis without contrast showed multiple dilated loops of bowel, no significant free fluid, possibly representing potential small bowel stricture versus ileus  - Patient now passing liquid stool in osotomy   - Surgery consulted. Appreciate recs. Diet advanced.     #Unstagable decubitus ulcers  #Hx of osteomyelitis associated with decubitus ulcers  #Paraplegia 2/2 MA  - Compared to prior pictures, ulcers appear deeper and redness more pronounced. Some drainage noted on exam. Patient also noted new purulent smelling drainage prior to presentation.  - Given improvement on rocphin, suspect sepsis was 2/2 UTI but cannot rule out recurrent acute osteomyelitis at presentation. Will get MRI pelvis as it would effect antimicrobial selection and duration. Will get wound culture.   - Wound care APRN consulted. Ostomy care. Supportive care.     #JAKE  - Prerenal vs. ATN from sepsis. CT did not reveal evidence of obstructive uropathy.   - Cr has improved 1.91=>1.13=>0.66.=>0.82=>0.62    #Hypokalemia  - Continue to replace as per protocol     #DM II  - SSI    #ARLIN on home CPAP   - Patient had desaturation event on 2/11 while sleeping. O2 quickly normalized when patient woke up. Will start CPAP while sleeping.     F: PO  E: Replace as needed   N: Advance to CLD    Code status: Full     Dispo: Pending clinical improvement       VTE Prophylaxis:   Mechanical Order History:     None      Pharmalogical Order History:      Ordered     Dose Route Frequency Stop    02/09/21 0747  apixaban (ELIQUIS) tablet 5 mg      5 mg PO Every 12 Hours Scheduled --                Brannon Adrian MD  [facility]  02/11/21  20:29  EST

## 2021-02-12 NOTE — PROGRESS NOTES
[facility] HOSPITALIST PROGRESS NOTE     Patient Identification:  Name:  Ken Krueger  Age:  45 y.o.  Sex:  male  :  1975  MRN:  [id number]  Visit Number:  [id number]  ROOM: [address]     Primary Care Provider:  Provider, No Known    Length of stay in inpatient status:  5    Subjective     Chief Compliant:    Chief Complaint   Patient presents with   • Vomiting   • Nausea   • Diarrhea       History of Presenting Illness:    Patient reports not resting well last night as our BiPAP machine is not comfortable. He denies fevers/chills. Denies any new complaints. Is hopeful to go home soon. Reports his brother has helped him with IV abx at home before.     ROS:  Otherwise 10 point ROS negative other than documented above in HPI.     Objective     Current Hospital Meds:apixaban, 5 mg, Oral, Q12H  ARIPiprazole, 10 mg, Oral, Nightly  atorvastatin, 10 mg, Oral, Nightly  baclofen, 30 mg, Oral, 4x Daily With Meals & Nightly  cefTRIAXone, 2 g, Intravenous, Q24H  cholecalciferol, 2,000 Units, Oral, Daily  DULoxetine, 60 mg, Oral, BID  famotidine, 20 mg, Intravenous, Q12H  gabapentin, 400 mg, Oral, Q8H  insulin aspart, 0-14 Units, Subcutaneous, TID AC  lactobacillus acidophilus, 1 capsule, Oral, Daily  magic barrier cream, , Topical, 4x Daily  sodium chloride, 10 mL, Intravenous, Q12H  sodium hypochlorite, , Topical, BID  [START ON 2021] vancomycin, 1,500 mg, Intravenous, Q12H    Pharmacy to dose vancomycin,         Current Antimicrobial Therapy:  Anti-Infectives (From admission, onward)    Ordered     Dose/Rate Route Frequency Start Stop    21 1049  vancomycin 1500 mg/500 mL 0.9% NS IVPB ([facility])     Ordering Provider: Tra Jain MD    1,500 mg  over 120 Minutes Intravenous Every 12 Hours 21 0000 21 2359    21 1004  vancomycin 2000 mg/500 mL 0.9% NS IVPB ([facility])     Ordering Provider: Tra Jain MD    2,000 mg  over 120 Minutes Intravenous Once 21 1200  02/12/21 1534    02/12/21 1002  cefTRIAXone (ROCEPHIN) 2 g/100 mL 0.9% NS IVPB ([facility])     Ordering Provider: Tra Jain MD    2 g  over 30 Minutes Intravenous Every 24 Hours 02/12/21 1100 03/12/21 2359    02/12/21 1001  Pharmacy to dose vancomycin     Ordering Provider: Tra Jain MD     Does not apply Continuous PRN 02/12/21 1001 03/12/21 1000    02/07/21 1555  vancomycin 2000 mg/500 mL 0.9% NS IVPB ([facility])     Ordering Provider: Bryce Farrar PA    2,000 mg  over 120 Minutes Intravenous Once 02/07/21 1630 02/07/21 2021    02/07/21 1547  aztreonam (AZACTAM) 2 g/100 mL 0.9% NS ([facility])     Ordering Provider: Bryce Farrar PA    2 g  over 30 Minutes Intravenous Once 02/07/21 1549 02/07/21 1800        Current Diuretic Therapy:  Diuretics (From admission, onward)    None        ----------------------------------------------------------------------------------------------------------------------  Vital Signs:  Temp:  [97.8 °F (36.6 °C)-98.4 °F (36.9 °C)] 98.1 °F (36.7 °C)  Heart Rate:  [73-98] 84  Resp:  [12-21] 18  BP: (127-165)/() 139/85  SpO2:  [90 %-100 %] 97 %  on  Flow (L/min):  [2] 2;   Device (Oxygen Therapy): room air  Body mass index is 38.93 kg/m².    Wt Readings from Last 3 Encounters:   02/12/21 127 kg (279 lb 1.6 oz)   06/02/20 (!) 150 kg (330 lb)   05/04/20 (!) 150 kg (330 lb)     Intake & Output (last 3 days)       02/09 0701 - 02/10 0700 02/10 0701 - 02/11 0700 02/11 0701 - 02/12 0700 02/12 0701 - 02/13 0700    P.O. 485 2035 840 480    I.V. (mL/kg) 1868.2 (15.2) 205 (1.6)      IV Piggyback  97 100 600    Total Intake(mL/kg) 2353.2 (19.1) 2337 (18.5) 940 (7.4) 1080 (8.5)    Urine (mL/kg/hr) 2775 (0.9) 2775 (0.9) 2850 (0.9) 765 (0.7)    Emesis/NG output   475     Stool 700 600  300    Total Output 3475 3375 3325 1065    Net -1121.9 -1038.1 -2385 +15                Diet Regular; Consistent  Carbohydrate  ----------------------------------------------------------------------------------------------------------------------  Physical exam:  Constitutional:  Well-developed and well-nourished.  No respiratory distress.      HENT:  Head:  Normocephalic and atraumatic.  Mouth:  Moist mucous membranes.    Eyes:  Conjunctivae and EOM are normal. No scleral icterus.    Neck:  Neck supple.  No JVD present.    Cardiovascular:  Normal rate, regular rhythm and normal heart sounds with no murmur.  Pulmonary/Chest:  No respiratory distress, no wheezes, no crackles, with normal breath sounds and good air movement.  Abdominal:  Soft.  Bowel sounds are normal.  No distension and no tenderness.   Musculoskeletal:  No edema, no tenderness, and no deformity.  No red or swollen joints anywhere.    Neurological:  Alert and oriented to person, place, and time.  No cranial nerve deficit.  No tongue deviation.  No facial droop.  No slurred speech.   Skin:  Skin is warm and dry. No rash noted. No pallor.   Peripheral vascular:  Pulses in all 4 extremities with no clubbing, no cyanosis, no edema.  ----------------------------------------------------------------------------------------------------------------------  Tele:    ----------------------------------------------------------------------------------------------------------------------  Results from last 7 days   Lab Units 02/12/21  0053 02/11/21  0051 02/10/21  0033  02/08/21  0903  02/07/21  1952 02/07/21  1356   CRP mg/dL 3.18* 3.50* 5.83*  --   --    < >  --  6.41*   LACTATE mmol/L  --   --   --   --  1.7  --  2.1* 3.4*   WBC 10*3/mm3 9.11 9.01 8.06   < > 9.64  --   --  17.88*   HEMOGLOBIN g/dL 10.5* 10.6* 10.3*   < > 10.5*  --   --  13.2   HEMATOCRIT % 37.5 38.4 38.1   < > 38.1  --   --  45.1   MCV fL 80.0 80.5 81.6   < > 81.2  --   --  77.2*   MCHC g/dL 28.0* 27.6* 27.0*   < > 27.6*  --   --  29.3*   PLATELETS 10*3/mm3 396 393 404   < > 459*  --   --  732*    < > =  values in this interval not displayed.         Results from last 7 days   Lab Units 02/12/21  0053 02/11/21  0051 02/10/21  1720 02/10/21  0033  02/09/21  0352 02/08/21  0902 02/07/21  1356   SODIUM mmol/L 138 128*  --  137  --  139 139 130*   POTASSIUM mmol/L 4.2 3.8 4.0 3.5   < > 3.2* 4.2 3.6   MAGNESIUM mg/dL  --   --   --   --   --   --  2.1 1.6   CHLORIDE mmol/L 103 95*  --  104  --  107 109* 92*   CO2 mmol/L 24.9 23.0  --  22.3  --  21.2* 19.3* 22.0   BUN mg/dL 16 13  --  15  --  22* 48* 59*   CREATININE mg/dL 0.74* 0.62*  --  0.82  --  0.66* 1.13 1.91*   EGFR IF NONAFRICN AM mL/min/1.73 115 142  --  103  --  132 71 39*   CALCIUM mg/dL 9.4 9.9  --  9.0  --  8.0* 7.8* 9.6   PHOSPHORUS mg/dL  --   --   --   --   --   --  3.0  --    GLUCOSE mg/dL 313* 235*  --  211*  --  163* 181* 327*   ALBUMIN g/dL  --   --   --   --   --  2.88* 3.06* 4.02   BILIRUBIN mg/dL  --   --   --   --   --  0.3 0.4 0.5   ALK PHOS U/L  --   --   --   --   --  79 86 114   AST (SGOT) U/L  --   --   --   --   --  25 27 24   ALT (SGPT) U/L  --   --   --   --   --  22 27 40    < > = values in this interval not displayed.   Estimated Creatinine Clearance: 174.8 mL/min (A) (by C-G formula based on SCr of 0.74 mg/dL (L)).  No results found for: AMMONIA  Results from last 7 days   Lab Units 02/07/21  1356   TROPONIN T ng/mL 0.029             Glucose   Date/Time Value Ref Range Status   02/12/2021 1037 343 (H) 70 - 130 mg/dL Final   02/12/2021 0628 319 (H) 70 - 130 mg/dL Final   02/12/2021 0000 323 (H) 70 - 130 mg/dL Final   02/11/2021 1956 266 (H) 70 - 130 mg/dL Final   02/11/2021 1701 300 (H) 70 - 130 mg/dL Final   02/11/2021 1106 282 (H) 70 - 130 mg/dL Final   02/11/2021 0510 255 (H) 70 - 130 mg/dL Final   02/11/2021 0011 211 (H) 70 - 130 mg/dL Final     Lab Results   Component Value Date    TSH 1.160 02/07/2021     No results found for: PREGTESTUR, PREGSERUM, HCG, HCGQUANT  Pain Management Panel     Pain Management Panel Latest Ref Rng & Units  8/19/2018 12/5/2017    AMPHETAMINES SCREEN, URINE Negative Negative Negative    BARBITURATES SCREEN Negative Negative Negative    BENZODIAZEPINE SCREEN, URINE Negative Negative Negative    BUPRENORPHINEUR Negative Negative Negative    COCAINE SCREEN, URINE Negative Negative Negative    METHADONE SCREEN, URINE Negative Negative Negative        Brief Urine Lab Results  (Last result in the past 365 days)      Color   Clarity   Blood   Leuk Est   Nitrite   Protein   CREAT   Urine HCG        02/07/21 1826 Dark Yellow Cloudy Negative Large (3+) Negative 100 mg/dL (2+)             Blood Culture   Date Value Ref Range Status   02/07/2021 No growth at 5 days  Final   02/07/2021 No growth at 5 days  Final     Urine Culture   Date Value Ref Range Status   02/07/2021 (A)  Final    >100,000 CFU/mL Klebsiella pneumoniae ssp pneumoniae     Wound Culture   Date Value Ref Range Status   02/11/2021 Scant growth (1+) Gram Negative Bacilli (A)  Preliminary     No results found for: STOOLCX  No results found for: RESPCX  No results found for: AFBCX  Results from last 7 days   Lab Units 02/12/21  0053 02/11/21  0051 02/10/21  0033 02/08/21  0903 02/08/21  0902 02/07/21  1952 02/07/21  1356   LACTATE mmol/L  --   --   --  1.7  --  2.1* 3.4*   SED RATE mm/hr  --   --   --   --   --   --  7   CRP mg/dL 3.18* 3.50* 5.83*  --  8.79*  --  6.41*       I have personally looked at the labs and they are summarized above.  ----------------------------------------------------------------------------------------------------------------------  Detailed radiology reports for the last 24 hours:    Imaging Results (Last 24 Hours)     Procedure Component Value Units Date/Time    XR Pelvis 1 or 2 View [[id number]] Collected: 02/12/21 1536     Updated: 02/12/21 1540    Narrative:      EXAMINATION: XR PELVIS 1 OR 2 VW-      CLINICAL INDICATION: AP pelvis; A41.9-Sepsis, unspecified organism;  R11.2-Nausea with vomiting, unspecified; R19.7-Diarrhea,  unspecified        COMPARISON: None available     FINDINGS: One view of the pelvis shows chronic deformity in the right  hip     No convincing evidence of an acute fracture      This report was finalized on 2/12/2021 3:38 PM by Dr. Hernesto Alaniz MD.       XR Femur 2 View Right [[id number]] Collected: 02/12/21 1528     Updated: 02/12/21 1528    Narrative:      EXAMINATION: XR FEMUR 2 VIEW RIGHT-      CLINICAL INDICATION: MRI results; AP and lateral R femur; A41.9-Sepsis,  unspecified organism; R11.2-Nausea with vomiting, unspecified;  R19.7-Diarrhea, unspecified        COMPARISON: None available.     FINDINGS: Four views of the right femur show chronic deformity of the  right femoral head. It is superior in relation to the acetabulum.     No evidence of an acute fracture.     Chronic deformity in the distal right femur is noted.       Impression:      Chronic changes, but no acute bony abnormality.               Assessment & Plan    #Septic shock 2/2 UTI  - Urine culture growing Klebsiella pneumonia. Sensitivities reviewed. ID consulted.   - Abx transitioned to rocephin.     #Unstagable decubitus ulcers  #Hx of osteomyelitis associated with decubitus ulcers  #Paraplegia 2/2 MA  - Compared to prior pictures, ulcers appear deeper and redness more pronounced. Some drainage noted on exam. Patient also noted new purulent smelling drainage prior to presentation.  - Given improvement on rocphin, suspect sepsis was 2/2 UTI but cannot rule out recurrent acute osteomyelitis at presentation. MRI obtained and consistent with osteo and again noted fracture.   - ID has added vancomycin to regimen and plan to treat as acute osteomyelitis.   - PICC line ordered   - Will follow wound culture   - Wound care APRN consulted. Ostomy care. Supportive care.     #Ileus vs. Possible small bowel obstruction   - CT the abdomen and pelvis without contrast showed multiple dilated loops of bowel, no significant free fluid, possibly representing  potential small bowel stricture versus ileus  - Patient now passing liquid stool in osotomy   - Surgery consulted. Appreciate recs. Diet advanced.     #Chronic appearing right femur fracture  - Orthopedic surgery consulted as per ID recs.     #JAKE  - Prerenal vs. ATN from sepsis. CT did not reveal evidence of obstructive uropathy.   - Cr has improved 1.91=>1.13=>0.66.=>0.82=>0.62=>0.74    #Hypokalemia  - Continue to replace as per protocol     #DM II  - SSI    #ARLIN on home CPAP   - Patient had desaturation event on 2/11 while sleeping. O2 quickly normalized when patient woke up. Will start CPAP while sleeping. Patient not tolerating our machine, recommended his family bring in his home device.     F: PO  E: Replace as needed   N: Regular     Code status: Full     Dispo: Pending clinical improvement     VTE Prophylaxis:   Mechanical Order History:     None      Pharmalogical Order History:      Ordered     Dose Route Frequency Stop    02/09/21 0747  apixaban (ELIQUIS) tablet 5 mg      5 mg PO Every 12 Hours Scheduled --                Brannon Adrian MD  [facility] Hospitalist  02/12/21  15:52 EST

## 2021-02-12 NOTE — PLAN OF CARE
Goal Outcome Evaluation:  Plan of Care Reviewed With: patient  Progress: improving       Pt VS stable, afebrile. IV antibiotic therapy in place, tolerating well. Awaiting PICC line insertion d/t poor venous access. No complaints at this time, will continue to monitor.      Problem: Adult Inpatient Plan of Care  Goal: Plan of Care Review  Outcome: Ongoing, Progressing  Flowsheets (Taken 2/12/2021 1529)  Progress: improving  Plan of Care Reviewed With: patient  Goal: Patient-Specific Goal (Individualized)  Outcome: Ongoing, Progressing  Goal: Absence of Hospital-Acquired Illness or Injury  Outcome: Ongoing, Progressing  Intervention: Identify and Manage Fall Risk  Recent Flowsheet Documentation  Taken 2/12/2021 1500 by Hortensia Rush RN  Safety Promotion/Fall Prevention: safety round/check completed  Taken 2/12/2021 1300 by Hortensia Rush RN  Safety Promotion/Fall Prevention: safety round/check completed  Taken 2/12/2021 1100 by Hortensia Rush RN  Safety Promotion/Fall Prevention: safety round/check completed  Taken 2/12/2021 0900 by Hortensia Rush RN  Safety Promotion/Fall Prevention: activity supervised  Taken 2/12/2021 0704 by Hortensia Rush RN  Safety Promotion/Fall Prevention:   activity supervised   safety round/check completed  Intervention: Prevent and Manage VTE (venous thromboembolism) Risk  Recent Flowsheet Documentation  Taken 2/12/2021 1410 by Hortensia Rush RN  VTE Prevention/Management: (see MAR) other (see comments)  Taken 2/12/2021 0810 by Hortensia Rush RN  VTE Prevention/Management: (see MAR) other (see comments)  Intervention: Prevent Infection  Recent Flowsheet Documentation  Taken 2/12/2021 1500 by Hortensia Rush RN  Infection Prevention: rest/sleep promoted  Taken 2/12/2021 1300 by Hortensia Rush RN  Infection Prevention: rest/sleep promoted  Taken 2/12/2021 1100 by Hortensia Rush RN  Infection Prevention: rest/sleep promoted  Taken 2/12/2021 0900 by Hortensia Rush RN  Infection  Prevention: rest/sleep promoted  Taken 2/12/2021 0704 by Hortensia Rush RN  Infection Prevention: rest/sleep promoted  Goal: Optimal Comfort and Wellbeing  Outcome: Ongoing, Progressing  Intervention: Provide Person-Centered Care  Recent Flowsheet Documentation  Taken 2/12/2021 1410 by Hortensia Rush RN  Trust Relationship/Rapport: care explained  Taken 2/12/2021 0810 by Hortensia Rush RN  Trust Relationship/Rapport:   care explained   questions answered  Goal: Readiness for Transition of Care  Outcome: Ongoing, Progressing     Problem: Skin Injury Risk Increased  Goal: Skin Health and Integrity  Outcome: Ongoing, Progressing  Intervention: Optimize Skin Protection  Recent Flowsheet Documentation  Taken 2/12/2021 1410 by oHrtensia Rush RN  Pressure Reduction Techniques: weight shift assistance provided  Pressure Reduction Devices: pressure-redistributing mattress utilized  Skin Protection:   tubing/devices free from skin contact   transparent dressing maintained  Taken 2/12/2021 0810 by Hortensia Rush RN  Pressure Reduction Techniques: weight shift assistance provided  Pressure Reduction Devices: pressure-redistributing mattress utilized  Skin Protection:   tubing/devices free from skin contact   transparent dressing maintained  Intervention: Promote and Optimize Oral Intake  Recent Flowsheet Documentation  Taken 2/12/2021 0810 by Hortensia Rush RN  Oral Nutrition Promotion: rest periods promoted     Problem: Fall Injury Risk  Goal: Absence of Fall and Fall-Related Injury  Outcome: Ongoing, Progressing  Intervention: Identify and Manage Contributors to Fall Injury Risk  Recent Flowsheet Documentation  Taken 2/12/2021 1500 by Hortensia Rush, RN  Medication Review/Management: medications reviewed  Taken 2/12/2021 1300 by Hortensia Rush RN  Medication Review/Management: medications reviewed  Taken 2/12/2021 1100 by Hortensia Rush, RN  Medication Review/Management: medications reviewed  Taken 2/12/2021 0900 by  Hortensia Rush RN  Medication Review/Management: medications reviewed  Taken 2/12/2021 0704 by Hortensia Rush RN  Medication Review/Management: medications reviewed  Intervention: Promote Injury-Free Environment  Recent Flowsheet Documentation  Taken 2/12/2021 1500 by Hortensia Rush RN  Safety Promotion/Fall Prevention: safety round/check completed  Taken 2/12/2021 1300 by Hortensia Rush RN  Safety Promotion/Fall Prevention: safety round/check completed  Taken 2/12/2021 1100 by Hortensia Rush RN  Safety Promotion/Fall Prevention: safety round/check completed  Taken 2/12/2021 0900 by Hortensia Rush RN  Safety Promotion/Fall Prevention: activity supervised  Taken 2/12/2021 0704 by Hortensia Rush RN  Safety Promotion/Fall Prevention:   activity supervised   safety round/check completed     Problem: Asthma Comorbidity  Goal: Maintenance of Asthma Control  Outcome: Ongoing, Progressing  Intervention: Maintain Asthma Symptom Control  Recent Flowsheet Documentation  Taken 2/12/2021 1500 by Hortensia Rush RN  Medication Review/Management: medications reviewed  Taken 2/12/2021 1300 by Hortensia Rush RN  Medication Review/Management: medications reviewed  Taken 2/12/2021 1100 by Hortensia Rush RN  Medication Review/Management: medications reviewed  Taken 2/12/2021 0900 by Hortensia Rush RN  Medication Review/Management: medications reviewed  Taken 2/12/2021 0704 by Hortensia Rush RN  Medication Review/Management: medications reviewed     Problem: COPD Comorbidity  Goal: Maintenance of COPD Symptom Control  Outcome: Ongoing, Progressing  Intervention: Maintain COPD-Symptom Control  Recent Flowsheet Documentation  Taken 2/12/2021 1500 by Hortensia Rush RN  Medication Review/Management: medications reviewed  Taken 2/12/2021 1300 by Hortensia Rush RN  Medication Review/Management: medications reviewed  Taken 2/12/2021 1100 by Hortensia Rush RN  Medication Review/Management: medications reviewed  Taken 2/12/2021  0900 by Hortensia Rush RN  Medication Review/Management: medications reviewed  Taken 2/12/2021 0704 by Hortensia Rush RN  Medication Review/Management: medications reviewed     Problem: Diabetes Comorbidity  Goal: Blood Glucose Level Within Desired Range  Outcome: Ongoing, Progressing  Intervention: Maintain Glycemic Control  Recent Flowsheet Documentation  Taken 2/12/2021 0810 by Hortensia Rush RN  Glycemic Management: blood glucose monitoring     Problem: Heart Failure Comorbidity  Goal: Maintenance of Heart Failure Symptom Control  Outcome: Ongoing, Progressing  Intervention: Maintain Heart Failure-Management Strategies  Recent Flowsheet Documentation  Taken 2/12/2021 1500 by Hortensia Rush RN  Medication Review/Management: medications reviewed  Taken 2/12/2021 1300 by Hortensia Rush RN  Medication Review/Management: medications reviewed  Taken 2/12/2021 1100 by Hortensia Rush RN  Medication Review/Management: medications reviewed  Taken 2/12/2021 0900 by Hortensia Rush RN  Medication Review/Management: medications reviewed  Taken 2/12/2021 0704 by Hortensia Rush RN  Medication Review/Management: medications reviewed     Problem: Hypertension Comorbidity  Goal: Blood Pressure in Desired Range  Outcome: Ongoing, Progressing  Intervention: Maintain Hypertension-Management Strategies  Recent Flowsheet Documentation  Taken 2/12/2021 1500 by Hortensia Rush RN  Medication Review/Management: medications reviewed  Taken 2/12/2021 1300 by Hortensia Rush RN  Medication Review/Management: medications reviewed  Taken 2/12/2021 1100 by Hortensia Rush RN  Medication Review/Management: medications reviewed  Taken 2/12/2021 0900 by Hortensia Rush RN  Medication Review/Management: medications reviewed  Taken 2/12/2021 0810 by Hortensia Rush RN  Syncope Management: verbally stimulated  Taken 2/12/2021 0704 by Hortensia Rush RN  Medication Review/Management: medications reviewed     Problem: Obstructive Sleep Apnea  Risk or Actual (Comorbidity Management)  Goal: Unobstructed Breathing During Sleep  Outcome: Ongoing, Progressing     Problem: Pain Chronic (Persistent) (Comorbidity Management)  Goal: Acceptable Pain Control and Functional Ability  Outcome: Ongoing, Progressing  Intervention: Develop Pain Management Plan  Recent Flowsheet Documentation  Taken 2/12/2021 0810 by Hortensia Rush RN  Pain Management Interventions: care clustered  Intervention: Manage Persistent Pain  Recent Flowsheet Documentation  Taken 2/12/2021 1500 by Hortensia Rush RN  Medication Review/Management: medications reviewed  Taken 2/12/2021 1410 by Hortensia Rush RN  Bowel Elimination Promotion: adequate fluid intake promoted  Taken 2/12/2021 1300 by Hortensia Rush RN  Medication Review/Management: medications reviewed  Taken 2/12/2021 1100 by Hortensia Rush RN  Medication Review/Management: medications reviewed  Taken 2/12/2021 0900 by Hortensia Rush RN  Medication Review/Management: medications reviewed  Taken 2/12/2021 0810 by Hortensia Rush RN  Bowel Elimination Promotion: adequate fluid intake promoted  Sleep/Rest Enhancement: regular sleep/rest pattern promoted  Taken 2/12/2021 0704 by Hortensia Rush RN  Medication Review/Management: medications reviewed  Intervention: Optimize Psychosocial Wellbeing  Recent Flowsheet Documentation  Taken 2/12/2021 1410 by Hortensia Rush RN  Diversional Activities: television  Family/Support System Care: support provided  Taken 2/12/2021 0810 by Hortensia Rush RN  Supportive Measures: active listening utilized  Diversional Activities: television  Family/Support System Care: support provided     Problem: Seizure Disorder Comorbidity  Goal: Maintenance of Seizure Control  Outcome: Ongoing, Progressing     Problem: Wound  Goal: Optimal Wound Healing  Outcome: Ongoing, Progressing  Intervention: Promote Effective Wound Healing  Recent Flowsheet Documentation  Taken 2/12/2021 0810 by Hortensia Rush RN  Oral  Nutrition Promotion: rest periods promoted  Pain Management Interventions: care clustered  Sleep/Rest Enhancement: regular sleep/rest pattern promoted  Goal: Optimal Wound Healing  Outcome: Ongoing, Progressing  Intervention: Promote Effective Wound Healing  Recent Flowsheet Documentation  Taken 2/12/2021 0810 by Hortensia Rush, RN  Oral Nutrition Promotion: rest periods promoted  Pain Management Interventions: care clustered  Sleep/Rest Enhancement: regular sleep/rest pattern promoted

## 2021-02-12 NOTE — PROGRESS NOTES
Discharge Planning Assessment  MAKI Regalado     Patient Name: Ken Krueger  MRN: [id number]  Today's Date: 2/12/2021    Admit Date: 2/7/2021        Discharge Plan     Row Name 02/12/21 1410       Plan    Plan  Pt was admitted on 2/7/21. Pt reports that he plans to return home at discharge and that his family will transport. SS will continue to follow and assist with discharge planning.    Patient/Family in Agreement with Plan  yes       Jessica Kilgore

## 2021-02-12 NOTE — PLAN OF CARE
Problem: Adult Inpatient Plan of Care  Goal: Plan of Care Review  Outcome: Ongoing, Progressing  Flowsheets (Taken 2/12/2021 0322)  Progress: improving  Plan of Care Reviewed With: patient  Outcome Summary: PT resting in bed at this time. PT continues on room air and has CPAP available for sleep apnea. Wound Vac is still in place with continous drainage. PT is hopeful to be discharged today. VSS. Afebrile. No other complaints or discomfort noted.  Goal: Patient-Specific Goal (Individualized)  Outcome: Ongoing, Progressing  Goal: Absence of Hospital-Acquired Illness or Injury  Outcome: Ongoing, Progressing  Intervention: Identify and Manage Fall Risk  Recent Flowsheet Documentation  Taken 2/12/2021 0300 by Tish Allen, RN  Safety Promotion/Fall Prevention:   clutter free environment maintained   assistive device/personal items within reach   activity supervised   fall prevention program maintained   nonskid shoes/slippers when out of bed   room organization consistent   safety round/check completed  Taken 2/12/2021 0200 by Tish Allen, RN  Safety Promotion/Fall Prevention:   activity supervised   assistive device/personal items within reach   clutter free environment maintained   fall prevention program maintained   nonskid shoes/slippers when out of bed   safety round/check completed   room organization consistent  Taken 2/12/2021 0100 by Tish Allen, RN  Safety Promotion/Fall Prevention:   activity supervised   assistive device/personal items within reach   clutter free environment maintained   fall prevention program maintained   nonskid shoes/slippers when out of bed   room organization consistent   safety round/check completed  Taken 2/11/2021 2300 by Tish Allen, RN  Safety Promotion/Fall Prevention:   activity supervised   assistive device/personal items within reach   clutter free environment maintained   fall prevention program maintained   nonskid shoes/slippers when out of bed   safety  round/check completed   room organization consistent  Taken 2/11/2021 2100 by Tish Allen RN  Safety Promotion/Fall Prevention:   activity supervised   clutter free environment maintained   assistive device/personal items within reach   fall prevention program maintained   nonskid shoes/slippers when out of bed   room organization consistent   safety round/check completed  Taken 2/11/2021 2030 by Tish Allen RN  Safety Promotion/Fall Prevention:   activity supervised   clutter free environment maintained   assistive device/personal items within reach   fall prevention program maintained   nonskid shoes/slippers when out of bed   room organization consistent   safety round/check completed  Taken 2/11/2021 1900 by Tish Allen RN  Safety Promotion/Fall Prevention:   activity supervised   assistive device/personal items within reach   clutter free environment maintained   fall prevention program maintained   nonskid shoes/slippers when out of bed   room organization consistent   safety round/check completed  Intervention: Prevent and Manage VTE (venous thromboembolism) Risk  Recent Flowsheet Documentation  Taken 2/12/2021 0200 by Tish Allen RN  VTE Prevention/Management: (See Mar) other (see comments)  Taken 2/11/2021 2030 by Tish Allen RN  VTE Prevention/Management: (See Mar) other (see comments)  Intervention: Prevent Infection  Recent Flowsheet Documentation  Taken 2/12/2021 0300 by Tish Allen RN  Infection Prevention: rest/sleep promoted  Taken 2/12/2021 0200 by Tish Allen RN  Infection Prevention: rest/sleep promoted  Taken 2/12/2021 0100 by Tish Allen RN  Infection Prevention: rest/sleep promoted  Taken 2/11/2021 2300 by Tish Allen RN  Infection Prevention: rest/sleep promoted  Taken 2/11/2021 2100 by Tish Allen RN  Infection Prevention: rest/sleep promoted  Taken 2/11/2021 2030 by Tish Allen RN  Infection Prevention: rest/sleep promoted  Taken  2/11/2021 1900 by Tish Allen RN  Infection Prevention: rest/sleep promoted  Goal: Optimal Comfort and Wellbeing  Outcome: Ongoing, Progressing  Intervention: Provide Person-Centered Care  Recent Flowsheet Documentation  Taken 2/12/2021 0200 by Tish Allen RN  Trust Relationship/Rapport:   care explained   choices provided   empathic listening provided   thoughts/feelings acknowledged  Taken 2/11/2021 2030 by Tish Allen RN  Trust Relationship/Rapport:   care explained   choices provided   empathic listening provided   thoughts/feelings acknowledged  Goal: Readiness for Transition of Care  Outcome: Ongoing, Progressing     Problem: Skin Injury Risk Increased  Goal: Skin Health and Integrity  Outcome: Ongoing, Progressing  Intervention: Optimize Skin Protection  Recent Flowsheet Documentation  Taken 2/12/2021 0200 by Tish Allen RN  Pressure Reduction Techniques: weight shift assistance provided  Pressure Reduction Devices: pressure-redistributing mattress utilized  Skin Protection:   tubing/devices free from skin contact   incontinence pads utilized   adhesive use limited  Taken 2/11/2021 2030 by Tish Allen RN  Pressure Reduction Techniques: weight shift assistance provided  Pressure Reduction Devices: pressure-redistributing mattress utilized  Skin Protection:   tubing/devices free from skin contact   incontinence pads utilized   adhesive use limited   skin-to-device areas padded  Intervention: Promote and Optimize Oral Intake  Recent Flowsheet Documentation  Taken 2/12/2021 0200 by Tish Allen RN  Oral Nutrition Promotion: rest periods promoted  Taken 2/11/2021 2030 by Tish Allen RN  Oral Nutrition Promotion: rest periods promoted     Problem: Fall Injury Risk  Goal: Absence of Fall and Fall-Related Injury  Outcome: Ongoing, Progressing  Intervention: Identify and Manage Contributors to Fall Injury Risk  Recent Flowsheet Documentation  Taken 2/12/2021 0300 by Tish Allen  RN  Medication Review/Management: medications reviewed  Taken 2/12/2021 0200 by Tish Allen RN  Medication Review/Management: medications reviewed  Self-Care Promotion:   independence encouraged   BADL personal objects within reach   BADL personal routines maintained  Taken 2/12/2021 0100 by Tish Allen RN  Medication Review/Management: medications reviewed  Taken 2/11/2021 2300 by Tish Allen, RN  Medication Review/Management: medications reviewed  Taken 2/11/2021 2100 by Tish Allen, RN  Medication Review/Management: medications reviewed  Taken 2/11/2021 2030 by Tish Allen RN  Medication Review/Management: medications reviewed  Self-Care Promotion:   independence encouraged   BADL personal objects within reach   BADL personal routines maintained  Taken 2/11/2021 1900 by Tish Allen, RN  Medication Review/Management: medications reviewed  Intervention: Promote Injury-Free Environment  Recent Flowsheet Documentation  Taken 2/12/2021 0300 by Tish Allen, RN  Safety Promotion/Fall Prevention:   clutter free environment maintained   assistive device/personal items within reach   activity supervised   fall prevention program maintained   nonskid shoes/slippers when out of bed   room organization consistent   safety round/check completed  Taken 2/12/2021 0200 by Tish Allen, RN  Safety Promotion/Fall Prevention:   activity supervised   assistive device/personal items within reach   clutter free environment maintained   fall prevention program maintained   nonskid shoes/slippers when out of bed   safety round/check completed   room organization consistent  Taken 2/12/2021 0100 by Tish Allen, RN  Safety Promotion/Fall Prevention:   activity supervised   assistive device/personal items within reach   clutter free environment maintained   fall prevention program maintained   nonskid shoes/slippers when out of bed   room organization consistent   safety round/check completed  Taken  2/11/2021 2300 by Tish Allen RN  Safety Promotion/Fall Prevention:   activity supervised   assistive device/personal items within reach   clutter free environment maintained   fall prevention program maintained   nonskid shoes/slippers when out of bed   safety round/check completed   room organization consistent  Taken 2/11/2021 2100 by Tish Allen RN  Safety Promotion/Fall Prevention:   activity supervised   clutter free environment maintained   assistive device/personal items within reach   fall prevention program maintained   nonskid shoes/slippers when out of bed   room organization consistent   safety round/check completed  Taken 2/11/2021 2030 by Tish Allen RN  Safety Promotion/Fall Prevention:   activity supervised   clutter free environment maintained   assistive device/personal items within reach   fall prevention program maintained   nonskid shoes/slippers when out of bed   room organization consistent   safety round/check completed  Taken 2/11/2021 1900 by Tish Allen RN  Safety Promotion/Fall Prevention:   activity supervised   assistive device/personal items within reach   clutter free environment maintained   fall prevention program maintained   nonskid shoes/slippers when out of bed   room organization consistent   safety round/check completed     Problem: Asthma Comorbidity  Goal: Maintenance of Asthma Control  Outcome: Ongoing, Progressing  Intervention: Maintain Asthma Symptom Control  Recent Flowsheet Documentation  Taken 2/12/2021 0300 by Tish Allen RN  Medication Review/Management: medications reviewed  Taken 2/12/2021 0200 by Tish Allen RN  Medication Review/Management: medications reviewed  Taken 2/12/2021 0100 by Tish Allen, RN  Medication Review/Management: medications reviewed  Taken 2/11/2021 2300 by Tish Allen, RN  Medication Review/Management: medications reviewed  Taken 2/11/2021 2100 by Tish Allen, RN  Medication Review/Management:  medications reviewed  Taken 2/11/2021 2030 by Tish Allen RN  Medication Review/Management: medications reviewed  Taken 2/11/2021 1900 by Tish Allen RN  Medication Review/Management: medications reviewed     Problem: COPD Comorbidity  Goal: Maintenance of COPD Symptom Control  Outcome: Ongoing, Progressing  Intervention: Maintain COPD-Symptom Control  Recent Flowsheet Documentation  Taken 2/12/2021 0300 by Tish Allen RN  Medication Review/Management: medications reviewed  Taken 2/12/2021 0200 by Tish Allen RN  Medication Review/Management: medications reviewed  Taken 2/12/2021 0100 by Tish Allen RN  Medication Review/Management: medications reviewed  Taken 2/11/2021 2300 by Tish Allen RN  Medication Review/Management: medications reviewed  Taken 2/11/2021 2100 by Tish Allen RN  Medication Review/Management: medications reviewed  Taken 2/11/2021 2030 by Tish Allen RN  Medication Review/Management: medications reviewed  Taken 2/11/2021 1900 by Tish Allen RN  Medication Review/Management: medications reviewed     Problem: Diabetes Comorbidity  Goal: Blood Glucose Level Within Desired Range  Outcome: Ongoing, Progressing  Intervention: Maintain Glycemic Control  Recent Flowsheet Documentation  Taken 2/12/2021 0200 by Tish Allen RN  Glycemic Management: blood glucose monitoring  Taken 2/11/2021 2030 by Tish Allen RN  Glycemic Management:   blood glucose monitoring   oral hydration promoted     Problem: Heart Failure Comorbidity  Goal: Maintenance of Heart Failure Symptom Control  Outcome: Ongoing, Progressing  Intervention: Maintain Heart Failure-Management Strategies  Recent Flowsheet Documentation  Taken 2/12/2021 0300 by Tish Allen RN  Medication Review/Management: medications reviewed  Taken 2/12/2021 0200 by Tish Allen, RN  Medication Review/Management: medications reviewed  Taken 2/12/2021 0100 by Tish Allen, RN  Medication  Review/Management: medications reviewed  Taken 2/11/2021 2300 by Tish Allen RN  Medication Review/Management: medications reviewed  Taken 2/11/2021 2100 by Tish Allen RN  Medication Review/Management: medications reviewed  Taken 2/11/2021 2030 by Tish Allen RN  Medication Review/Management: medications reviewed  Taken 2/11/2021 1900 by Tish Allen RN  Medication Review/Management: medications reviewed     Problem: Hypertension Comorbidity  Goal: Blood Pressure in Desired Range  Outcome: Ongoing, Progressing  Intervention: Maintain Hypertension-Management Strategies  Recent Flowsheet Documentation  Taken 2/12/2021 0300 by Tish Allen RN  Medication Review/Management: medications reviewed  Taken 2/12/2021 0200 by Tish Allen RN  Medication Review/Management: medications reviewed  Taken 2/12/2021 0100 by Tish Allen RN  Medication Review/Management: medications reviewed  Taken 2/11/2021 2300 by Tish Allen RN  Medication Review/Management: medications reviewed  Taken 2/11/2021 2100 by Tish Allen RN  Medication Review/Management: medications reviewed  Taken 2/11/2021 2030 by Tish Allen RN  Medication Review/Management: medications reviewed  Taken 2/11/2021 1900 by Tish Allen RN  Medication Review/Management: medications reviewed     Problem: Obstructive Sleep Apnea Risk or Actual (Comorbidity Management)  Goal: Unobstructed Breathing During Sleep  Outcome: Ongoing, Progressing     Problem: Pain Chronic (Persistent) (Comorbidity Management)  Goal: Acceptable Pain Control and Functional Ability  Outcome: Ongoing, Progressing  Intervention: Develop Pain Management Plan  Recent Flowsheet Documentation  Taken 2/12/2021 0200 by Tish Allen RN  Pain Management Interventions:   care clustered   relaxation techniques promoted   quiet environment facilitated   pillow support provided  Taken 2/11/2021 2030 by Tish Allen RN  Pain Management Interventions:    care clustered   relaxation techniques promoted   quiet environment facilitated  Intervention: Manage Persistent Pain  Recent Flowsheet Documentation  Taken 2/12/2021 0300 by Tish Allen RN  Medication Review/Management: medications reviewed  Taken 2/12/2021 0200 by Tish Allen RN  Bowel Elimination Promotion: adequate fluid intake promoted  Sleep/Rest Enhancement:   awakenings minimized   consistent schedule promoted   regular sleep/rest pattern promoted   room darkened   relaxation techniques promoted  Medication Review/Management: medications reviewed  Taken 2/12/2021 0100 by Tish Allen RN  Medication Review/Management: medications reviewed  Taken 2/11/2021 2300 by Tish Allen RN  Medication Review/Management: medications reviewed  Taken 2/11/2021 2100 by Tish Allen RN  Medication Review/Management: medications reviewed  Taken 2/11/2021 2030 by Tish Allen RN  Bowel Elimination Promotion: adequate fluid intake promoted  Sleep/Rest Enhancement:   awakenings minimized   consistent schedule promoted   relaxation techniques promoted   room darkened   regular sleep/rest pattern promoted  Medication Review/Management: medications reviewed  Taken 2/11/2021 1900 by Tish Allen RN  Medication Review/Management: medications reviewed  Intervention: Optimize Psychosocial Wellbeing  Recent Flowsheet Documentation  Taken 2/12/2021 0200 by Tish Allen RN  Diversional Activities:   television   smartphone  Family/Support System Care:   self-care encouraged   support provided  Taken 2/11/2021 2030 by Tish Allen RN  Supportive Measures: active listening utilized  Diversional Activities: television  Family/Support System Care: support provided     Problem: Seizure Disorder Comorbidity  Goal: Maintenance of Seizure Control  Outcome: Ongoing, Progressing  Intervention: Maintain Seizure-Symptom Control  Recent Flowsheet Documentation  Taken 2/12/2021 0300 by Tish Allen RN  Seizure  Precautions: activity supervised  Taken 2/12/2021 0200 by Tish Allen, RN  Seizure Precautions: activity supervised  Taken 2/12/2021 0100 by Tish Allen, RN  Seizure Precautions: activity supervised  Taken 2/11/2021 2300 by Tish Allen, RN  Seizure Precautions: activity supervised  Taken 2/11/2021 2100 by Tish Allen, RN  Seizure Precautions:   activity supervised   clutter-free environment maintained   emergency equipment at bedside  Taken 2/11/2021 2030 by Tish Allen, RN  Seizure Precautions:   activity supervised   clutter-free environment maintained   emergency equipment at bedside  Taken 2/11/2021 1900 by Tish lAlen, RN  Seizure Precautions:   activity supervised   clutter-free environment maintained   emergency equipment at bedside   Goal Outcome Evaluation:  Plan of Care Reviewed With: patient  Progress: improving  Outcome Summary: PT resting in bed at this time. PT continues on room air and has CPAP available for sleep apnea. Wound Vac is still in place with continous drainage. PT is hopeful to be discharged today. VSS. Afebrile. No other complaints or discomfort noted.

## 2021-02-12 NOTE — PROGRESS NOTES
Pharmacy to Dose Vancomycin    Patient initiated on vancomycin for likely osteomyelitis . Based on patient's age, weight and SCr, will start vancomycin at 2000 mg IV x1 loading dose followed by 1500 mg IV q12h. Will check a trough level at steady state and follow.    Antimicrobial Length of Therapy:    Day 3 Rocephin  Day 1 Vancomycin    Thank you,    Ericka Wade, PharmD

## 2021-02-13 ENCOUNTER — APPOINTMENT (OUTPATIENT)
Dept: GENERAL RADIOLOGY | Facility: HOSPITAL | Age: 46
End: 2021-02-13

## 2021-02-13 LAB
A-A DO2: 54.2 MMHG (ref 0–300)
ANION GAP SERPL CALCULATED.3IONS-SCNC: 10.3 MMOL/L (ref 5–15)
ANISOCYTOSIS BLD QL: NORMAL
ARTERIAL PATENCY WRIST A: POSITIVE
ATMOSPHERIC PRESS: 729 MMHG
BASE EXCESS BLDA CALC-SCNC: 4.1 MMOL/L (ref 0–2)
BASOPHILS # BLD AUTO: 0.03 10*3/MM3 (ref 0–0.2)
BASOPHILS NFR BLD AUTO: 0.3 % (ref 0–1.5)
BDY SITE: ABNORMAL
BODY TEMPERATURE: 0 C
BUN SERPL-MCNC: 15 MG/DL (ref 6–20)
BUN/CREAT SERPL: 22.4 (ref 7–25)
CALCIUM SPEC-SCNC: 9.5 MG/DL (ref 8.6–10.5)
CHLORIDE SERPL-SCNC: 102 MMOL/L (ref 98–107)
CO2 BLDA-SCNC: 30.5 MMOL/L (ref 22–33)
CO2 SERPL-SCNC: 25.7 MMOL/L (ref 22–29)
COHGB MFR BLD: 1.2 % (ref 0–5)
CREAT SERPL-MCNC: 0.67 MG/DL (ref 0.76–1.27)
CRP SERPL-MCNC: 2.89 MG/DL (ref 0–0.5)
DEPRECATED RDW RBC AUTO: 46.5 FL (ref 37–54)
EOSINOPHIL # BLD AUTO: 0.21 10*3/MM3 (ref 0–0.4)
EOSINOPHIL NFR BLD AUTO: 2 % (ref 0.3–6.2)
ERYTHROCYTE [DISTWIDTH] IN BLOOD BY AUTOMATED COUNT: 16.1 % (ref 12.3–15.4)
GAS FLOW AIRWAY: 2 LPM
GFR SERPL CREATININE-BSD FRML MDRD: 129 ML/MIN/1.73
GLUCOSE BLDC GLUCOMTR-MCNC: 270 MG/DL (ref 70–130)
GLUCOSE BLDC GLUCOMTR-MCNC: 290 MG/DL (ref 70–130)
GLUCOSE BLDC GLUCOMTR-MCNC: 318 MG/DL (ref 70–130)
GLUCOSE BLDC GLUCOMTR-MCNC: 327 MG/DL (ref 70–130)
GLUCOSE SERPL-MCNC: 281 MG/DL (ref 65–99)
HCO3 BLDA-SCNC: 29.2 MMOL/L (ref 20–26)
HCT VFR BLD AUTO: 37.3 % (ref 37.5–51)
HCT VFR BLD CALC: 34.7 % (ref 38–51)
HGB BLD-MCNC: 10.5 G/DL (ref 13–17.7)
HGB BLDA-MCNC: 11.3 G/DL (ref 14–18)
HYPOCHROMIA BLD QL: NORMAL
IMM GRANULOCYTES # BLD AUTO: 0.07 10*3/MM3 (ref 0–0.05)
IMM GRANULOCYTES NFR BLD AUTO: 0.7 % (ref 0–0.5)
INHALED O2 CONCENTRATION: 28 %
LYMPHOCYTES # BLD AUTO: 2.71 10*3/MM3 (ref 0.7–3.1)
LYMPHOCYTES NFR BLD AUTO: 26.2 % (ref 19.6–45.3)
Lab: ABNORMAL
MCH RBC QN AUTO: 22.6 PG (ref 26.6–33)
MCHC RBC AUTO-ENTMCNC: 28.2 G/DL (ref 31.5–35.7)
MCV RBC AUTO: 80.4 FL (ref 79–97)
METHGB BLD QL: <1 % (ref 0–3)
MODALITY: ABNORMAL
MONOCYTES # BLD AUTO: 0.4 10*3/MM3 (ref 0.1–0.9)
MONOCYTES NFR BLD AUTO: 3.9 % (ref 5–12)
NEUTROPHILS NFR BLD AUTO: 6.94 10*3/MM3 (ref 1.7–7)
NEUTROPHILS NFR BLD AUTO: 66.9 % (ref 42.7–76)
NOTE: ABNORMAL
NOTIFIED BY: ABNORMAL
NOTIFIED WHO: ABNORMAL
NRBC BLD AUTO-RTO: 0 /100 WBC (ref 0–0.2)
OXYHGB MFR BLDV: 95.3 % (ref 94–99)
PCO2 BLDA: 44.7 MM HG (ref 35–45)
PCO2 TEMP ADJ BLD: ABNORMAL MM[HG]
PH BLDA: 7.42 PH UNITS (ref 7.35–7.45)
PH, TEMP CORRECTED: ABNORMAL
PLAT MORPH BLD: NORMAL
PLATELET # BLD AUTO: 400 10*3/MM3 (ref 140–450)
PMV BLD AUTO: 10.4 FL (ref 6–12)
PO2 BLDA: 86.6 MM HG (ref 83–108)
PO2 TEMP ADJ BLD: ABNORMAL MM[HG]
POTASSIUM SERPL-SCNC: 4 MMOL/L (ref 3.5–5.2)
RBC # BLD AUTO: 4.64 10*6/MM3 (ref 4.14–5.8)
SAO2 % BLDCOA: 96.9 % (ref 94–99)
SODIUM SERPL-SCNC: 138 MMOL/L (ref 136–145)
VANCOMYCIN TROUGH SERPL-MCNC: 7.9 MCG/ML (ref 5–20)
VENTILATOR MODE: ABNORMAL
WBC # BLD AUTO: 10.36 10*3/MM3 (ref 3.4–10.8)

## 2021-02-13 PROCEDURE — 25010000002 VANCOMYCIN: Performed by: INTERNAL MEDICINE

## 2021-02-13 PROCEDURE — 63710000001 INSULIN ASPART PER 5 UNITS: Performed by: INTERNAL MEDICINE

## 2021-02-13 PROCEDURE — 85025 COMPLETE CBC W/AUTO DIFF WBC: CPT | Performed by: INTERNAL MEDICINE

## 2021-02-13 PROCEDURE — 25010000002 TIGECYCLINE PER 1 MG: Performed by: PHYSICIAN ASSISTANT

## 2021-02-13 PROCEDURE — 82805 BLOOD GASES W/O2 SATURATION: CPT

## 2021-02-13 PROCEDURE — 85007 BL SMEAR W/DIFF WBC COUNT: CPT | Performed by: INTERNAL MEDICINE

## 2021-02-13 PROCEDURE — 99233 SBSQ HOSP IP/OBS HIGH 50: CPT | Performed by: INTERNAL MEDICINE

## 2021-02-13 PROCEDURE — 80202 ASSAY OF VANCOMYCIN: CPT | Performed by: INTERNAL MEDICINE

## 2021-02-13 PROCEDURE — 83050 HGB METHEMOGLOBIN QUAN: CPT

## 2021-02-13 PROCEDURE — 36600 WITHDRAWAL OF ARTERIAL BLOOD: CPT

## 2021-02-13 PROCEDURE — 82375 ASSAY CARBOXYHB QUANT: CPT

## 2021-02-13 PROCEDURE — 94660 CPAP INITIATION&MGMT: CPT

## 2021-02-13 PROCEDURE — 71045 X-RAY EXAM CHEST 1 VIEW: CPT

## 2021-02-13 PROCEDURE — 94799 UNLISTED PULMONARY SVC/PX: CPT

## 2021-02-13 PROCEDURE — 86140 C-REACTIVE PROTEIN: CPT | Performed by: NURSE PRACTITIONER

## 2021-02-13 PROCEDURE — 80048 BASIC METABOLIC PNL TOTAL CA: CPT | Performed by: INTERNAL MEDICINE

## 2021-02-13 PROCEDURE — 25010000002 CEFTRIAXONE PER 250 MG: Performed by: INTERNAL MEDICINE

## 2021-02-13 PROCEDURE — 82962 GLUCOSE BLOOD TEST: CPT

## 2021-02-13 RX ORDER — FAMOTIDINE 20 MG/1
20 TABLET, FILM COATED ORAL
Status: DISCONTINUED | OUTPATIENT
Start: 2021-02-13 | End: 2021-02-14

## 2021-02-13 RX ADMIN — GABAPENTIN 400 MG: 400 CAPSULE ORAL at 14:14

## 2021-02-13 RX ADMIN — SODIUM HYPOCHLORITE: 1.25 SOLUTION TOPICAL at 21:05

## 2021-02-13 RX ADMIN — SODIUM CHLORIDE, PRESERVATIVE FREE 10 ML: 5 INJECTION INTRAVENOUS at 09:13

## 2021-02-13 RX ADMIN — SODIUM CHLORIDE, PRESERVATIVE FREE 10 ML: 5 INJECTION INTRAVENOUS at 21:05

## 2021-02-13 RX ADMIN — DULOXETINE HYDROCHLORIDE 60 MG: 60 CAPSULE, DELAYED RELEASE ORAL at 09:13

## 2021-02-13 RX ADMIN — CHOLECALCIFEROL TAB 10 MCG (400 UNIT) 2000 UNITS: 10 TAB at 09:13

## 2021-02-13 RX ADMIN — Medication: at 21:05

## 2021-02-13 RX ADMIN — Medication: at 17:51

## 2021-02-13 RX ADMIN — Medication: at 00:09

## 2021-02-13 RX ADMIN — FAMOTIDINE 20 MG: 20 TABLET, FILM COATED ORAL at 17:25

## 2021-02-13 RX ADMIN — APIXABAN 5 MG: 5 TABLET, FILM COATED ORAL at 09:12

## 2021-02-13 RX ADMIN — INSULIN ASPART 8 UNITS: 100 INJECTION, SOLUTION INTRAVENOUS; SUBCUTANEOUS at 17:24

## 2021-02-13 RX ADMIN — GABAPENTIN 400 MG: 400 CAPSULE ORAL at 21:01

## 2021-02-13 RX ADMIN — SODIUM HYPOCHLORITE: 1.25 SOLUTION TOPICAL at 00:10

## 2021-02-13 RX ADMIN — INSULIN ASPART 10 UNITS: 100 INJECTION, SOLUTION INTRAVENOUS; SUBCUTANEOUS at 09:13

## 2021-02-13 RX ADMIN — SODIUM HYPOCHLORITE: 1.25 SOLUTION TOPICAL at 10:27

## 2021-02-13 RX ADMIN — Medication 1 CAPSULE: at 09:13

## 2021-02-13 RX ADMIN — Medication: at 09:22

## 2021-02-13 RX ADMIN — ATORVASTATIN CALCIUM 10 MG: 10 TABLET, FILM COATED ORAL at 21:01

## 2021-02-13 RX ADMIN — Medication: at 12:15

## 2021-02-13 RX ADMIN — DULOXETINE HYDROCHLORIDE 60 MG: 60 CAPSULE, DELAYED RELEASE ORAL at 21:01

## 2021-02-13 RX ADMIN — BACLOFEN 30 MG: 10 TABLET ORAL at 09:12

## 2021-02-13 RX ADMIN — VANCOMYCIN HYDROCHLORIDE 1500 MG: 10 INJECTION, POWDER, LYOPHILIZED, FOR SOLUTION INTRAVENOUS at 00:09

## 2021-02-13 RX ADMIN — GABAPENTIN 400 MG: 400 CAPSULE ORAL at 06:14

## 2021-02-13 RX ADMIN — ARIPIPRAZOLE 10 MG: 10 TABLET ORAL at 21:01

## 2021-02-13 RX ADMIN — APIXABAN 5 MG: 5 TABLET, FILM COATED ORAL at 21:01

## 2021-02-13 RX ADMIN — BACLOFEN 30 MG: 10 TABLET ORAL at 21:01

## 2021-02-13 RX ADMIN — INSULIN ASPART 10 UNITS: 100 INJECTION, SOLUTION INTRAVENOUS; SUBCUTANEOUS at 12:21

## 2021-02-13 RX ADMIN — FAMOTIDINE 20 MG: 10 INJECTION INTRAVENOUS at 09:13

## 2021-02-13 RX ADMIN — BACLOFEN 30 MG: 10 TABLET ORAL at 17:25

## 2021-02-13 NOTE — PLAN OF CARE
Goal Outcome Evaluation:    Pt resting in bed. VSS on 2L NC. Afebrile. Wound vac in place. Urostomy, colostomy, and midline patent. No shortness of breath or pain reported. No distress noted. Call light in reach.

## 2021-02-13 NOTE — PROGRESS NOTES
[facility] HOSPITALIST PROGRESS NOTE     Patient Identification:  Name:  Ken Krueger  Age:  45 y.o.  Sex:  male  :  1975  MRN:  [id number]  Visit Number:  [id number]  ROOM: [address]     Primary Care Provider:  Provider, No Known    Length of stay in inpatient status:  6    Subjective     Chief Compliant:    Chief Complaint   Patient presents with   • Vomiting   • Nausea   • Diarrhea       History of Presenting Illness:    I was alerted by nurse that patient's SPO2 was dropping into the 60's while sleeping with his home CPAP device. Patient's O2 quickly normalizes when he wakes back up. I immediately went to see patient. He was initially sleeping with his O2 dropping down to the 50's. I woke patient up and O2 quickly normalized to 98%. His home CPAP was in place. Home CPAP on room air. Device looked old and he did not know how old it was. He was on a pressure of 15. Patient did not note any dyspnea or any complaints when he woke up.     Tele reviewed. Couple episodes of documented hypoxia overnight and many episodes of apnea greater than 30 seconds.     ROS:  Otherwise 10 point ROS negative other than documented above in HPI.     Objective     Current Hospital Meds:apixaban, 5 mg, Oral, Q12H  ARIPiprazole, 10 mg, Oral, Nightly  atorvastatin, 10 mg, Oral, Nightly  baclofen, 30 mg, Oral, 4x Daily With Meals & Nightly  cefTRIAXone, 2 g, Intravenous, Q24H  cholecalciferol, 2,000 Units, Oral, Daily  DULoxetine, 60 mg, Oral, BID  famotidine, 20 mg, Intravenous, Q12H  gabapentin, 400 mg, Oral, Q8H  insulin aspart, 0-14 Units, Subcutaneous, TID AC  lactobacillus acidophilus, 1 capsule, Oral, Daily  magic barrier cream, , Topical, 4x Daily  sodium chloride, 10 mL, Intravenous, Q12H  sodium hypochlorite, , Topical, BID  vancomycin, 1,500 mg, Intravenous, Q12H    Pharmacy to dose vancomycin,         Current Antimicrobial Therapy:  Anti-Infectives (From admission, onward)    Ordered     Dose/Rate Route  Frequency Start Stop    02/12/21 1049  vancomycin 1500 mg/500 mL 0.9% NS IVPB ([facility])     Ordering Provider: Tra Jain MD    1,500 mg  over 120 Minutes Intravenous Every 12 Hours 02/13/21 0000 03/12/21 2359    02/12/21 1004  vancomycin 2000 mg/500 mL 0.9% NS IVPB ([facility])     Ordering Provider: Tra Jain MD    2,000 mg  over 120 Minutes Intravenous Once 02/12/21 1200 02/12/21 1534    02/12/21 1002  cefTRIAXone (ROCEPHIN) 2 g/100 mL 0.9% NS IVPB ([facility])     Ordering Provider: Tra Jain MD    2 g  over 30 Minutes Intravenous Every 24 Hours 02/12/21 1100 03/12/21 2359    02/12/21 1001  Pharmacy to dose vancomycin     Ordering Provider: Tra Jain MD     Does not apply Continuous PRN 02/12/21 1001 03/12/21 1000    02/07/21 1555  vancomycin 2000 mg/500 mL 0.9% NS IVPB ([facility])     Ordering Provider: Bryce Farrar PA    2,000 mg  over 120 Minutes Intravenous Once 02/07/21 1630 02/07/21 2021    02/07/21 1547  aztreonam (AZACTAM) 2 g/100 mL 0.9% NS ([facility])     Ordering Provider: Bryce Farrar PA    2 g  over 30 Minutes Intravenous Once 02/07/21 1549 02/07/21 1800        Current Diuretic Therapy:  Diuretics (From admission, onward)    None        ----------------------------------------------------------------------------------------------------------------------  Vital Signs:  Temp:  [98.1 °F (36.7 °C)-98.7 °F (37.1 °C)] 98.6 °F (37 °C)  Heart Rate:  [73-97] 78  Resp:  [14-20] 14  BP: (133-169)/() 158/97  SpO2:  [94 %-100 %] 96 %  on  Flow (L/min):  [2] 2;   Device (Oxygen Therapy): CPAP;room air  Body mass index is 39.94 kg/m².    Wt Readings from Last 3 Encounters:   02/13/21 130 kg (286 lb 6 oz)   06/02/20 (!) 150 kg (330 lb)   05/04/20 (!) 150 kg (330 lb)     Intake & Output (last 3 days)       02/10 0701 - 02/11 0700 02/11 0701 - 02/12 0700 02/12 0701 - 02/13 0700 02/13 0701 - 02/14 0700    P.O. 2035 840 840 300    I.V. (mL/kg) 205 (1.6)  599 (4.6)     IV Piggyback 97 100 600      Total Intake(mL/kg) 2337 (18.5) 940 (7.4) 2039 (15.7) 300 (2.3)    Urine (mL/kg/hr) 2775 (0.9) 2850 (0.9) 3090 (1) 100 (0.4)    Emesis/NG output  475      Stool 600  1050     Total Output 3375 3325 4140 100    Net -1038.1 -7115 -2101 +200                Diet Regular; Consistent Carbohydrate  ----------------------------------------------------------------------------------------------------------------------  Physical exam:  Constitutional:  Well-developed and well-nourished.  No respiratory distress. Obese.       HENT:  Head:  Normocephalic and atraumatic.  Mouth:  Moist mucous membranes.    Eyes:  Conjunctivae and EOM are normal. No scleral icterus.    Neck:  Neck supple.  No JVD present.    Cardiovascular:  Normal rate, regular rhythm and normal heart sounds with no murmur.  Pulmonary/Chest:  No respiratory distress, no wheezes, no crackles, with normal breath sounds and good air movement.  Abdominal:  Soft.  Bowel sounds are normal.  No distension and no tenderness.   Neurological:  Alert and oriented to person, place, and time.  No cranial nerve deficit.  No tongue deviation.  No facial droop.  No slurred speech.   Skin:  Skin is warm and dry. No rash noted. No pallor.   Peripheral vascular:  Pulses in all 4 extremities with no clubbing, no cyanosis, no edema.  ----------------------------------------------------------------------------------------------------------------------  Tele:    ----------------------------------------------------------------------------------------------------------------------  Results from last 7 days   Lab Units 02/13/21  0210 02/12/21  0053 02/11/21  0051 02/08/21  0903  02/07/21  1952 02/07/21  1356   CRP mg/dL 2.89* 3.18* 3.50*   < >  --    < >  --  6.41*   LACTATE mmol/L  --   --   --   --  1.7  --  2.1* 3.4*   WBC 10*3/mm3 10.36 9.11 9.01   < > 9.64  --   --  17.88*   HEMOGLOBIN g/dL 10.5* 10.5* 10.6*   < > 10.5*  --   --  13.2   HEMATOCRIT % 37.3* 37.5 38.4   < > 38.1  --    --  45.1   MCV fL 80.4 80.0 80.5   < > 81.2  --   --  77.2*   MCHC g/dL 28.2* 28.0* 27.6*   < > 27.6*  --   --  29.3*   PLATELETS 10*3/mm3 400 396 393   < > 459*  --   --  732*    < > = values in this interval not displayed.         Results from last 7 days   Lab Units 02/13/21  0210 02/12/21  0053 02/11/21  0051  02/09/21  0352 02/08/21  0902 02/07/21  1356   SODIUM mmol/L 138 138 128*   < > 139 139 130*   POTASSIUM mmol/L 4.0 4.2 3.8   < > 3.2* 4.2 3.6   MAGNESIUM mg/dL  --   --   --   --   --  2.1 1.6   CHLORIDE mmol/L 102 103 95*   < > 107 109* 92*   CO2 mmol/L 25.7 24.9 23.0   < > 21.2* 19.3* 22.0   BUN mg/dL 15 16 13   < > 22* 48* 59*   CREATININE mg/dL 0.67* 0.74* 0.62*   < > 0.66* 1.13 1.91*   EGFR IF NONAFRICN AM mL/min/1.73 129 115 142   < > 132 71 39*   CALCIUM mg/dL 9.5 9.4 9.9   < > 8.0* 7.8* 9.6   PHOSPHORUS mg/dL  --   --   --   --   --  3.0  --    GLUCOSE mg/dL 281* 313* 235*   < > 163* 181* 327*   ALBUMIN g/dL  --   --   --   --  2.88* 3.06* 4.02   BILIRUBIN mg/dL  --   --   --   --  0.3 0.4 0.5   ALK PHOS U/L  --   --   --   --  79 86 114   AST (SGOT) U/L  --   --   --   --  25 27 24   ALT (SGPT) U/L  --   --   --   --  22 27 40    < > = values in this interval not displayed.   Estimated Creatinine Clearance: 195.4 mL/min (A) (by C-G formula based on SCr of 0.67 mg/dL (L)).  No results found for: AMMONIA  Results from last 7 days   Lab Units 02/07/21  1356   TROPONIN T ng/mL 0.029             Glucose   Date/Time Value Ref Range Status   02/13/2021 0616 318 (H) 70 - 130 mg/dL Final   02/12/2021 2346 278 (H) 70 - 130 mg/dL Final   02/12/2021 1640 289 (H) 70 - 130 mg/dL Final   02/12/2021 1037 343 (H) 70 - 130 mg/dL Final   02/12/2021 0628 319 (H) 70 - 130 mg/dL Final   02/12/2021 0000 323 (H) 70 - 130 mg/dL Final   02/11/2021 1956 266 (H) 70 - 130 mg/dL Final   02/11/2021 1701 300 (H) 70 - 130 mg/dL Final     Lab Results   Component Value Date    TSH 1.160 02/07/2021     No results found for:  PREGTESTUR, PREGSERUM, HCG, HCGQUANT  Pain Management Panel     Pain Management Panel Latest Ref Rng & Units 8/19/2018 12/5/2017    AMPHETAMINES SCREEN, URINE Negative Negative Negative    BARBITURATES SCREEN Negative Negative Negative    BENZODIAZEPINE SCREEN, URINE Negative Negative Negative    BUPRENORPHINEUR Negative Negative Negative    COCAINE SCREEN, URINE Negative Negative Negative    METHADONE SCREEN, URINE Negative Negative Negative        Brief Urine Lab Results  (Last result in the past 365 days)      Color   Clarity   Blood   Leuk Est   Nitrite   Protein   CREAT   Urine HCG        02/07/21 1826 Dark Yellow Cloudy Negative Large (3+) Negative 100 mg/dL (2+)             Blood Culture   Date Value Ref Range Status   02/07/2021 No growth at 5 days  Final   02/07/2021 No growth at 5 days  Final     Urine Culture   Date Value Ref Range Status   02/07/2021 (A)  Final    >100,000 CFU/mL Klebsiella pneumoniae ssp pneumoniae     Wound Culture   Date Value Ref Range Status   02/11/2021 Scant growth (1+) Gram Negative Bacilli (A)  Preliminary     No results found for: STOOLCX  No results found for: RESPCX  No results found for: AFBCX  Results from last 7 days   Lab Units 02/13/21  0210 02/12/21  0053 02/11/21  0051 02/10/21  0033 02/08/21  0903 02/08/21  0902 02/07/21  1952 02/07/21  1356   LACTATE mmol/L  --   --   --   --  1.7  --  2.1* 3.4*   SED RATE mm/hr  --   --   --   --   --   --   --  7   CRP mg/dL 2.89* 3.18* 3.50* 5.83*  --  8.79*  --  6.41*       I have personally looked at the labs and they are summarized above.  ----------------------------------------------------------------------------------------------------------------------  Detailed radiology reports for the last 24 hours:    Imaging Results (Last 24 Hours)     Procedure Component Value Units Date/Time    XR Femur 2 View Right [[id number]] Collected: 02/12/21 1528     Updated: 02/12/21 1626    Narrative:      EXAMINATION: XR FEMUR 2 VIEW RIGHT-       CLINICAL INDICATION: MRI results; AP and lateral R femur; A41.9-Sepsis,  unspecified organism; R11.2-Nausea with vomiting, unspecified;  R19.7-Diarrhea, unspecified        COMPARISON: None available.     FINDINGS: Four views of the right femur show chronic deformity of the  right femoral head. It is superior in relation to the acetabulum.     No evidence of an acute fracture.     Chronic deformity in the distal right femur is noted.       Impression:      Chronic changes, but no acute bony abnormality.         This report was finalized on 2/12/2021 4:23 PM by Dr. Hernesto Alaniz MD.       XR Pelvis 1 or 2 View [[id number]] Collected: 02/12/21 1536     Updated: 02/12/21 1540    Narrative:      EXAMINATION: XR PELVIS 1 OR 2 VW-      CLINICAL INDICATION: AP pelvis; A41.9-Sepsis, unspecified organism;  R11.2-Nausea with vomiting, unspecified; R19.7-Diarrhea, unspecified        COMPARISON: None available     FINDINGS: One view of the pelvis shows chronic deformity in the right  hip     No convincing evidence of an acute fracture      This report was finalized on 2/12/2021 3:38 PM by Dr. Hernesto Alaniz MD.           Assessment & Plan    #Septic shock 2/2 UTI  - Urine culture growing Klebsiella pneumonia. Sensitivities reviewed. ID consulted.   - Abx transitioned to rocephin. Has completed treatment for UTI.     #Unstagable decubitus ulcers  #Hx of osteomyelitis associated with decubitus ulcers  #Paraplegia 2/2 MA  - Compared to prior pictures, ulcers appear deeper and redness more pronounced. Some drainage noted on exam. Patient also noted new purulent smelling drainage prior to presentation.  - Given improvement on rocphin, suspect sepsis was 2/2 UTI but cannot rule out recurrent acute osteomyelitis at presentation. MRI obtained and consistent with osteo and again noted fracture.   - ID has added vancomycin to regimen and plan to treat as acute osteomyelitis.   - PICC line ordered. Midline placed 2/12.   - Wound culture  growing scant growth gram negative bacilli.   - Wound care APRN consulted. Ostomy care. Supportive care.     #Ileus vs. Possible small bowel obstruction   - CT the abdomen and pelvis without contrast showed multiple dilated loops of bowel, no significant free fluid, possibly representing potential small bowel stricture versus ileus  - Patient now passing liquid stool in osotomy   - Surgery consulted. Appreciate recs. Diet advanced.     #Chronic appearing right femur fracture  - Orthopedic surgery consulted as per ID recs.     #JAKE  - Prerenal vs. ATN from sepsis. CT did not reveal evidence of obstructive uropathy.   - Cr has improved 1.91=>1.13=>0.66.=>0.82=>0.62=>0.74=>0.67    #Hypokalemia  - Continue to replace as per protocol     #DM II  - SSI    #ARLIN on home CPAP   - Patient continues to have desaturation events on home CPAP while sleeping. No hypoxia while awake.   - Will start CPAP with our machine so we can titrate settings if needed. Will start with home pressure of 15. Can also transition to BiPAP if needed.  Patient may need supplemental O2 with home CPAP as he currently uses it with room air.   - Will consider HFNC as an alternative option while inpatient as it can deliver PEEP also.   - Will get ABG and chest x-ray.     F: PO  E: Replace as needed   N: Regular     Code status: Full     Dispo: Pending clinical improvement     VTE Prophylaxis:   Mechanical Order History:     None      Pharmalogical Order History:      Ordered     Dose Route Frequency Stop    02/09/21 7808  apixaban (ELIQUIS) tablet 5 mg      5 mg PO Every 12 Hours Scheduled --                Brannon Adrian MD  [facility]  02/13/21  08:52 EST
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
## 2021-02-13 NOTE — PROGRESS NOTES
PROGRESS NOTE         Patient Identification:  Name:  Ken Krueger  Age:  45 y.o.  Sex:  male  :  1975  MRN:  [id number]  Visit Number:  [id number]  Primary Care Provider:  Provider, No Known         LOS: 6 days       ----------------------------------------------------------------------------------------------------------------------  Subjective       Chief Complaints:    Vomiting, Nausea, and Diarrhea        Interval History:      Patient is sitting up in bed this morning on CPAP in no apparent distress.  Denies any new complaints at this time.  Afebrile.  No increased output from ostomy.  CRP is improving at 2.89.  WBC remains normal.  Chest x-ray on 2021 showed no acute disease.  X-ray of the pelvis on 2021 showed chronic deformity in the right hip and no convincing evidence of an acute fracture.  X-ray of the right femur on 2021 showed chronic changes but no acute bony abnormality. Wound culture is so far showing growth of MDRO Acinetobacter baumannii and rare staph aureus.    Review of Systems:    Constitutional: no fever, chills and night sweats. No appetite change or unexpected weight change. No fatigue.  Eyes: no eye drainage, itching or redness.  HEENT: no mouth sores, dysphagia or nose bleed.  Respiratory: no for shortness of breath, cough or production of sputum.  Cardiovascular: no chest pain, no palpitations, no orthopnea.  Gastrointestinal: no nausea, vomiting or diarrhea. No abdominal pain, hematemesis or rectal bleeding.  Genitourinary: no dysuria or polyuria.  Hematologic/lymphatic: no lymph node abnormalities, no easy bruising or easy bleeding.  Musculoskeletal: no muscle or joint pain.  Skin: No rash and no itching.  Neurological: no loss of consciousness, no seizure, no headache.  Behavioral/Psych: no depression or suicidal ideation.  Endocrine: no hot flashes.  Immunologic:  negative.    ----------------------------------------------------------------------------------------------------------------------      Objective       Current [facility] Meds:  apixaban, 5 mg, Oral, Q12H  ARIPiprazole, 10 mg, Oral, Nightly  atorvastatin, 10 mg, Oral, Nightly  baclofen, 30 mg, Oral, 4x Daily With Meals & Nightly  cefTRIAXone, 2 g, Intravenous, Q24H  cholecalciferol, 2,000 Units, Oral, Daily  DULoxetine, 60 mg, Oral, BID  famotidine, 20 mg, Intravenous, Q12H  gabapentin, 400 mg, Oral, Q8H  insulin aspart, 0-14 Units, Subcutaneous, TID AC  lactobacillus acidophilus, 1 capsule, Oral, Daily  magic barrier cream, , Topical, 4x Daily  sodium chloride, 10 mL, Intravenous, Q12H  sodium hypochlorite, , Topical, BID  vancomycin, 1,500 mg, Intravenous, Q12H      Pharmacy to dose vancomycin,       ----------------------------------------------------------------------------------------------------------------------    Vital Signs:  Temp:  [98.1 °F (36.7 °C)-98.7 °F (37.1 °C)] 98.4 °F (36.9 °C)  Heart Rate:  [] 76  Resp:  [14-20] 17  BP: (133-169)/(73-99) 160/93  Mean Arterial Pressure (Non-Invasive) for the past 24 hrs (Last 3 readings):   Noninvasive MAP (mmHg)   02/13/21 1002 116   02/13/21 0902 90   02/13/21 0802 101     SpO2 Percentage    02/13/21 0902 02/13/21 0906 02/13/21 1002   SpO2: 96% 94% 99%     SpO2:  [63 %-99 %] 99 %  on  Flow (L/min):  [2] 2;   Device (Oxygen Therapy): nasal cannula    Body mass index is 39.94 kg/m².  Wt Readings from Last 3 Encounters:   02/13/21 130 kg (286 lb 6 oz)   06/02/20 (!) 150 kg (330 lb)   05/04/20 (!) 150 kg (330 lb)        Intake/Output Summary (Last 24 hours) at 2/13/2021 1149  Last data filed at 2/13/2021 1002  Gross per 24 hour   Intake 2199 ml   Output 4300 ml   Net -2101 ml     Diet Regular; Consistent Carbohydrate  ----------------------------------------------------------------------------------------------------------------------      Physical  Exam:    Constitutional:  Well-developed and well-nourished.  No respiratory distress.      HENT:  Head: Normocephalic and atraumatic.  Mouth:  Moist mucous membranes.    Eyes:  Conjunctivae and EOM are normal.  No scleral icterus.  Neck:  Neck supple.  No JVD present.    Cardiovascular:  Normal rate, regular rhythm and normal heart sounds with no murmur. No edema.  Pulmonary/Chest:  No respiratory distress, no wheezes, no crackles, with normal breath sounds and good air movement.  Abdominal:  Soft.  Bowel sounds are normal.  Positive distention, no tenderness.  Colostomy and urostomy.  Musculoskeletal: Left AKA, right leg atrophy.  Neurological:  Alert and oriented to person, place, and time.  No facial droop.  No slurred speech.   Skin:   Decubitus ulcers.  Psychiatric:  Normal mood and affect.  Behavior is normal.    ----------------------------------------------------------------------------------------------------------------------  Results from last 7 days   Lab Units 02/07/21  1356   TROPONIN T ng/mL 0.029         Results from last 7 days   Lab Units 02/13/21  0905   PH, ARTERIAL pH units 7.423   PO2 ART mm Hg 86.6   PCO2, ARTERIAL mm Hg 44.7   HCO3 ART mmol/L 29.2*     Results from last 7 days   Lab Units 02/13/21  0210 02/12/21  0053 02/11/21  0051  02/08/21  0903  02/07/21  1952 02/07/21  1356   CRP mg/dL 2.89* 3.18* 3.50*   < >  --    < >  --  6.41*   LACTATE mmol/L  --   --   --   --  1.7  --  2.1* 3.4*   WBC 10*3/mm3 10.36 9.11 9.01   < > 9.64  --   --  17.88*   HEMOGLOBIN g/dL 10.5* 10.5* 10.6*   < > 10.5*  --   --  13.2   HEMATOCRIT % 37.3* 37.5 38.4   < > 38.1  --   --  45.1   MCV fL 80.4 80.0 80.5   < > 81.2  --   --  77.2*   MCHC g/dL 28.2* 28.0* 27.6*   < > 27.6*  --   --  29.3*   PLATELETS 10*3/mm3 400 396 393   < > 459*  --   --  732*    < > = values in this interval not displayed.     Results from last 7 days   Lab Units 02/13/21  0210 02/12/21  0053 02/11/21  0051 02/09/21  0352  02/08/21  0902 02/07/21  1356   SODIUM mmol/L 138 138 128*   < > 139 139 130*   POTASSIUM mmol/L 4.0 4.2 3.8   < > 3.2* 4.2 3.6   MAGNESIUM mg/dL  --   --   --   --   --  2.1 1.6   CHLORIDE mmol/L 102 103 95*   < > 107 109* 92*   CO2 mmol/L 25.7 24.9 23.0   < > 21.2* 19.3* 22.0   BUN mg/dL 15 16 13   < > 22* 48* 59*   CREATININE mg/dL 0.67* 0.74* 0.62*   < > 0.66* 1.13 1.91*   EGFR IF NONAFRICN AM mL/min/1.73 129 115 142   < > 132 71 39*   CALCIUM mg/dL 9.5 9.4 9.9   < > 8.0* 7.8* 9.6   GLUCOSE mg/dL 281* 313* 235*   < > 163* 181* 327*   ALBUMIN g/dL  --   --   --   --  2.88* 3.06* 4.02   BILIRUBIN mg/dL  --   --   --   --  0.3 0.4 0.5   ALK PHOS U/L  --   --   --   --  79 86 114   AST (SGOT) U/L  --   --   --   --  25 27 24   ALT (SGPT) U/L  --   --   --   --  22 27 40    < > = values in this interval not displayed.   Estimated Creatinine Clearance: 195.4 mL/min (A) (by C-G formula based on SCr of 0.67 mg/dL (L)).  No results found for: AMMONIA    Glucose   Date/Time Value Ref Range Status   02/13/2021 0616 318 (H) 70 - 130 mg/dL Final   02/12/2021 2346 278 (H) 70 - 130 mg/dL Final   02/12/2021 1640 289 (H) 70 - 130 mg/dL Final   02/12/2021 1037 343 (H) 70 - 130 mg/dL Final   02/12/2021 0628 319 (H) 70 - 130 mg/dL Final   02/12/2021 0000 323 (H) 70 - 130 mg/dL Final   02/11/2021 1956 266 (H) 70 - 130 mg/dL Final   02/11/2021 1701 300 (H) 70 - 130 mg/dL Final     Lab Results   Component Value Date    HGBA1C 8.90 (H) 10/19/2019     Lab Results   Component Value Date    TSH 1.160 02/07/2021       Blood Culture   Date Value Ref Range Status   02/07/2021 No growth at 3 days  Preliminary   02/07/2021 No growth at 3 days  Preliminary     Urine Culture   Date Value Ref Range Status   02/07/2021 (A)  Final    >100,000 CFU/mL Klebsiella pneumoniae ssp pneumoniae     No results found for: WOUNDCX  No results found for: STOOLCX  No results found for: RESPCX  Pain Management Panel     Pain Management Panel Latest Ref Rng &  Units 8/19/2018 12/5/2017    AMPHETAMINES SCREEN, URINE Negative Negative Negative    BARBITURATES SCREEN Negative Negative Negative    BENZODIAZEPINE SCREEN, URINE Negative Negative Negative    BUPRENORPHINEUR Negative Negative Negative    COCAINE SCREEN, URINE Negative Negative Negative    METHADONE SCREEN, URINE Negative Negative Negative            ----------------------------------------------------------------------------------------------------------------------  Imaging Results (Last 24 Hours)     Procedure Component Value Units Date/Time    XR Chest 1 View [[id number]] Collected: 02/13/21 1046     Updated: 02/13/21 1048    Narrative:      PROCEDURE: CR Chest 1 Vw    COMPARISON:  2/7/2021     INDICATIONS: hypoxia; Sepsis, unspecified organism; Nausea with vomiting, unspecified; Diarrhea, unspecified      TECHNIQUE: Single AP  view of the chest    FINDINGS:  Cardiomediastinal silhouette is stable and lungs are clear. Hardware seen in the cervicothoracic spine extending to the mid thoracic spine. Osseous structures are intact.      Impression:      No acute disease.         Signer Name: Aylin Doe MD   Signed: 2/13/2021 10:46 AM   Workstation Name: [facility]    [facility]    XR Femur 2 View Right [[id number]] Collected: 02/12/21 1528     Updated: 02/12/21 1626    Narrative:      EXAMINATION: XR FEMUR 2 VIEW RIGHT-      CLINICAL INDICATION: MRI results; AP and lateral R femur; A41.9-Sepsis,  unspecified organism; R11.2-Nausea with vomiting, unspecified;  R19.7-Diarrhea, unspecified        COMPARISON: None available.     FINDINGS: Four views of the right femur show chronic deformity of the  right femoral head. It is superior in relation to the acetabulum.     No evidence of an acute fracture.     Chronic deformity in the distal right femur is noted.       Impression:      Chronic changes, but no acute bony abnormality.         This report was finalized on 2/12/2021 4:23 PM by   Hernesto Alaniz MD.       XR Pelvis 1 or 2 View [[id number]] Collected: 02/12/21 1536     Updated: 02/12/21 1540    Narrative:      EXAMINATION: XR PELVIS 1 OR 2 VW-      CLINICAL INDICATION: AP pelvis; A41.9-Sepsis, unspecified organism;  R11.2-Nausea with vomiting, unspecified; R19.7-Diarrhea, unspecified        COMPARISON: None available     FINDINGS: One view of the pelvis shows chronic deformity in the right  hip     No convincing evidence of an acute fracture      This report was finalized on 2/12/2021 3:38 PM by Dr. Hernesto Alaniz MD.             ----------------------------------------------------------------------------------------------------------------------    Assessment/Plan       Assessment/Plan     ASSESSMENT:    1.  Severe sepsis with lactic acid greater than 2 on admission  2.  UTI    PLAN:    Patient is sitting up in bed this morning on CPAP in no apparent distress.  Denies any new complaints at this time.  Afebrile.  No increased output from ostomy.  CRP is improving at 2.89.  WBC remains normal.  Chest x-ray on 2/13/2021 showed no acute disease.  X-ray of the pelvis on 2/12/2021 showed chronic deformity in the right hip and no convincing evidence of an acute fracture.  X-ray of the right femur on 2/12/2021 showed chronic changes but no acute bony abnormality. Wound culture is so far showing growth of MDRO Acinetobacter baumannii and rare staph aureus.    MRI of pelvis with and without contrast on 2/11/2021 showed old fracture of the proximal right femur.  There is surrounding fluid and minimal increased signal in the proximal right femur.  This would support a diagnosis of osteomyelitis.  Minimal fluid in left hip as well.      COVID-19 PCR negative.  Blood culture showed no growth thus far.  Lactic acid 3.4 on admission.  GI panel negative.  Urinalysis positive with urine culture finalizing as Klebsiella pneumoniae.  Chest x-ray and abdominal x-ray unremarkable.  ET of the abdomen and pelvis from  2/7/2021 reports multiple dilated loops of small bowel that may represent potential small bowel obstruction versus ileus.    As the wound culture is now showing growth of MDRO Acinetobacter baumannii and rare staph aureus, we will transition to Tygacil 100 mg IV every 12 hours followed by 50 mg IV q 12 hours for at least 4 weeks.  We will follow culture finalization closely and adjust antibiotic therapy as appropriate.    Code Status:   Code Status and Medical Interventions:   Ordered at: 02/07/21 1753     Code Status:    CPR     Medical Interventions (Level of Support Prior to Arrest):    Full       KAYLAH Rhodes  02/13/21  11:49 EST

## 2021-02-13 NOTE — PLAN OF CARE
Goal Outcome Evaluation:  Plan of Care Reviewed With: patient  Progress: improving       Pt VS stable, afebrile. IV antibiotic changed to Tygacil due to MDRO in wound. Pt tolerating all procedures well, no complaints at this time. Will continue to monitor.      Problem: Adult Inpatient Plan of Care  Goal: Plan of Care Review  Outcome: Ongoing, Progressing  Flowsheets (Taken 2/13/2021 1613)  Progress: improving  Plan of Care Reviewed With: patient  Goal: Patient-Specific Goal (Individualized)  Outcome: Ongoing, Progressing  Goal: Absence of Hospital-Acquired Illness or Injury  Outcome: Ongoing, Progressing  Intervention: Identify and Manage Fall Risk  Recent Flowsheet Documentation  Taken 2/13/2021 1300 by Hortensia Rush RN  Safety Promotion/Fall Prevention: safety round/check completed  Taken 2/13/2021 1100 by Hortensia Rush RN  Safety Promotion/Fall Prevention: safety round/check completed  Taken 2/13/2021 0900 by Hortensia Rush RN  Safety Promotion/Fall Prevention: safety round/check completed  Taken 2/13/2021 0700 by Hortensia Rush RN  Safety Promotion/Fall Prevention: safety round/check completed  Intervention: Prevent and Manage VTE (venous thromboembolism) Risk  Recent Flowsheet Documentation  Taken 2/13/2021 0830 by Hortensia Rush RN  VTE Prevention/Management: (see MAR) other (see comments)  Intervention: Prevent Infection  Recent Flowsheet Documentation  Taken 2/13/2021 1300 by Hortensia Rush RN  Infection Prevention: rest/sleep promoted  Taken 2/13/2021 1100 by Hortensia Rush RN  Infection Prevention: rest/sleep promoted  Taken 2/13/2021 0900 by Hortensia Rush RN  Infection Prevention: rest/sleep promoted  Taken 2/13/2021 0700 by Hortensia Rush RN  Infection Prevention: rest/sleep promoted  Goal: Optimal Comfort and Wellbeing  Outcome: Ongoing, Progressing  Intervention: Provide Person-Centered Care  Recent Flowsheet Documentation  Taken 2/13/2021 0830 by Hortensia Rush RN  Trust  Relationship/Rapport: care explained  Goal: Readiness for Transition of Care  Outcome: Ongoing, Progressing     Problem: Skin Injury Risk Increased  Goal: Skin Health and Integrity  Outcome: Ongoing, Progressing  Intervention: Optimize Skin Protection  Recent Flowsheet Documentation  Taken 2/13/2021 0830 by Hortensia Rush RN  Pressure Reduction Techniques: weight shift assistance provided  Pressure Reduction Devices: pressure-redistributing mattress utilized  Skin Protection:   tubing/devices free from skin contact   transparent dressing maintained  Intervention: Promote and Optimize Oral Intake  Recent Flowsheet Documentation  Taken 2/13/2021 0830 by Hortensia Rush RN  Oral Nutrition Promotion: rest periods promoted     Problem: Fall Injury Risk  Goal: Absence of Fall and Fall-Related Injury  Outcome: Ongoing, Progressing  Intervention: Identify and Manage Contributors to Fall Injury Risk  Recent Flowsheet Documentation  Taken 2/13/2021 1300 by Hortensia Rush RN  Medication Review/Management: medications reviewed  Taken 2/13/2021 1100 by Hortensia Rush RN  Medication Review/Management: medications reviewed  Taken 2/13/2021 0900 by Hortensia Rush RN  Medication Review/Management: medications reviewed  Taken 2/13/2021 0700 by Hortensia Rush RN  Medication Review/Management: medications reviewed  Intervention: Promote Injury-Free Environment  Recent Flowsheet Documentation  Taken 2/13/2021 1300 by Hortensia Rush RN  Safety Promotion/Fall Prevention: safety round/check completed  Taken 2/13/2021 1100 by Hortensia Rush RN  Safety Promotion/Fall Prevention: safety round/check completed  Taken 2/13/2021 0900 by Hortensia Rush RN  Safety Promotion/Fall Prevention: safety round/check completed  Taken 2/13/2021 0700 by Hortensia Rush RN  Safety Promotion/Fall Prevention: safety round/check completed     Problem: Asthma Comorbidity  Goal: Maintenance of Asthma Control  Outcome: Ongoing, Progressing  Intervention:  Maintain Asthma Symptom Control  Recent Flowsheet Documentation  Taken 2/13/2021 1300 by Hortensia Rush RN  Medication Review/Management: medications reviewed  Taken 2/13/2021 1100 by Hortensia Rush RN  Medication Review/Management: medications reviewed  Taken 2/13/2021 0900 by Hortensia Rush RN  Medication Review/Management: medications reviewed  Taken 2/13/2021 0700 by Hortensia Rush RN  Medication Review/Management: medications reviewed     Problem: COPD Comorbidity  Goal: Maintenance of COPD Symptom Control  Outcome: Ongoing, Progressing  Intervention: Maintain COPD-Symptom Control  Recent Flowsheet Documentation  Taken 2/13/2021 1300 by Hortensia Rush RN  Medication Review/Management: medications reviewed  Taken 2/13/2021 1100 by Hortensia Rush RN  Medication Review/Management: medications reviewed  Taken 2/13/2021 0900 by Hortensia Rush RN  Medication Review/Management: medications reviewed  Taken 2/13/2021 0700 by Hortensia Rush RN  Medication Review/Management: medications reviewed     Problem: Diabetes Comorbidity  Goal: Blood Glucose Level Within Desired Range  Outcome: Ongoing, Progressing  Intervention: Maintain Glycemic Control  Recent Flowsheet Documentation  Taken 2/13/2021 0830 by Hortensia Rush RN  Glycemic Management:   blood glucose monitoring   supplemental insulin given     Problem: Heart Failure Comorbidity  Goal: Maintenance of Heart Failure Symptom Control  Outcome: Ongoing, Progressing  Intervention: Maintain Heart Failure-Management Strategies  Recent Flowsheet Documentation  Taken 2/13/2021 1300 by Hortensia Rush RN  Medication Review/Management: medications reviewed  Taken 2/13/2021 1100 by Hortensia Rush RN  Medication Review/Management: medications reviewed  Taken 2/13/2021 0900 by Hortensia Rush RN  Medication Review/Management: medications reviewed  Taken 2/13/2021 0700 by Hortensia Rush RN  Medication Review/Management: medications reviewed     Problem: Hypertension  Comorbidity  Goal: Blood Pressure in Desired Range  Outcome: Ongoing, Progressing  Intervention: Maintain Hypertension-Management Strategies  Recent Flowsheet Documentation  Taken 2/13/2021 1300 by Hortensia Rush RN  Medication Review/Management: medications reviewed  Taken 2/13/2021 1100 by Hortensia Rush RN  Medication Review/Management: medications reviewed  Taken 2/13/2021 0900 by Hortensia Rush RN  Medication Review/Management: medications reviewed  Taken 2/13/2021 0830 by Hortensia Rush RN  Syncope Management: verbally stimulated  Taken 2/13/2021 0700 by Hortensia Rush RN  Medication Review/Management: medications reviewed     Problem: Obstructive Sleep Apnea Risk or Actual (Comorbidity Management)  Goal: Unobstructed Breathing During Sleep  Outcome: Ongoing, Progressing     Problem: Pain Chronic (Persistent) (Comorbidity Management)  Goal: Acceptable Pain Control and Functional Ability  Outcome: Ongoing, Progressing  Intervention: Manage Persistent Pain  Recent Flowsheet Documentation  Taken 2/13/2021 1300 by Hortensia Rush RN  Medication Review/Management: medications reviewed  Taken 2/13/2021 1100 by Hortensia Rush RN  Medication Review/Management: medications reviewed  Taken 2/13/2021 0900 by Hortensia Rush RN  Medication Review/Management: medications reviewed  Taken 2/13/2021 0830 by Hortensia Rush RN  Sleep/Rest Enhancement:   awakenings minimized   regular sleep/rest pattern promoted  Taken 2/13/2021 0700 by Hortensia Rush RN  Medication Review/Management: medications reviewed  Intervention: Optimize Psychosocial Wellbeing  Recent Flowsheet Documentation  Taken 2/13/2021 0830 by Hortensia Rush RN  Supportive Measures:   active listening utilized   verbalization of feelings encouraged  Diversional Activities: television  Family/Support System Care: support provided     Problem: Seizure Disorder Comorbidity  Goal: Maintenance of Seizure Control  Outcome: Ongoing, Progressing     Problem:  Wound  Goal: Optimal Wound Healing  Outcome: Ongoing, Progressing  Intervention: Promote Effective Wound Healing  Recent Flowsheet Documentation  Taken 2/13/2021 0830 by Hortensia Rush, RN  Oral Nutrition Promotion: rest periods promoted  Sleep/Rest Enhancement:   awakenings minimized   regular sleep/rest pattern promoted  Goal: Optimal Wound Healing  Outcome: Ongoing, Progressing  Intervention: Promote Effective Wound Healing  Recent Flowsheet Documentation  Taken 2/13/2021 0830 by Hortensia Rush, RN  Oral Nutrition Promotion: rest periods promoted  Sleep/Rest Enhancement:   awakenings minimized   regular sleep/rest pattern promoted  Goal: Optimal Wound Healing  Outcome: Ongoing, Progressing  Intervention: Promote Effective Wound Healing  Recent Flowsheet Documentation  Taken 2/13/2021 0830 by Hortensia Rush, RN  Oral Nutrition Promotion: rest periods promoted  Sleep/Rest Enhancement:   awakenings minimized   regular sleep/rest pattern promoted

## 2021-02-14 LAB
ALBUMIN SERPL-MCNC: 3.24 G/DL (ref 3.5–5.2)
ALBUMIN/GLOB SERPL: 0.8 G/DL
ALP SERPL-CCNC: 84 U/L (ref 39–117)
ALT SERPL W P-5'-P-CCNC: 29 U/L (ref 1–41)
ANION GAP SERPL CALCULATED.3IONS-SCNC: 8.9 MMOL/L (ref 5–15)
ANISOCYTOSIS BLD QL: NORMAL
AST SERPL-CCNC: 23 U/L (ref 1–40)
BASOPHILS # BLD AUTO: 0.04 10*3/MM3 (ref 0–0.2)
BASOPHILS NFR BLD AUTO: 0.4 % (ref 0–1.5)
BILIRUB SERPL-MCNC: 0.2 MG/DL (ref 0–1.2)
BUN SERPL-MCNC: 21 MG/DL (ref 6–20)
BUN/CREAT SERPL: 29.2 (ref 7–25)
CALCIUM SPEC-SCNC: 9.4 MG/DL (ref 8.6–10.5)
CHLORIDE SERPL-SCNC: 99 MMOL/L (ref 98–107)
CO2 SERPL-SCNC: 28.1 MMOL/L (ref 22–29)
CREAT SERPL-MCNC: 0.72 MG/DL (ref 0.76–1.27)
CRP SERPL-MCNC: 3.43 MG/DL (ref 0–0.5)
DEPRECATED RDW RBC AUTO: 46.1 FL (ref 37–54)
EOSINOPHIL # BLD AUTO: 0.25 10*3/MM3 (ref 0–0.4)
EOSINOPHIL NFR BLD AUTO: 2.4 % (ref 0.3–6.2)
ERYTHROCYTE [DISTWIDTH] IN BLOOD BY AUTOMATED COUNT: 16.1 % (ref 12.3–15.4)
GFR SERPL CREATININE-BSD FRML MDRD: 119 ML/MIN/1.73
GLOBULIN UR ELPH-MCNC: 3.9 GM/DL
GLUCOSE BLDC GLUCOMTR-MCNC: 266 MG/DL (ref 70–130)
GLUCOSE BLDC GLUCOMTR-MCNC: 275 MG/DL (ref 70–130)
GLUCOSE BLDC GLUCOMTR-MCNC: 290 MG/DL (ref 70–130)
GLUCOSE BLDC GLUCOMTR-MCNC: 350 MG/DL (ref 70–130)
GLUCOSE SERPL-MCNC: 324 MG/DL (ref 65–99)
HCT VFR BLD AUTO: 37.2 % (ref 37.5–51)
HGB BLD-MCNC: 10.5 G/DL (ref 13–17.7)
HYPOCHROMIA BLD QL: NORMAL
IMM GRANULOCYTES # BLD AUTO: 0.05 10*3/MM3 (ref 0–0.05)
IMM GRANULOCYTES NFR BLD AUTO: 0.5 % (ref 0–0.5)
LYMPHOCYTES # BLD AUTO: 2.54 10*3/MM3 (ref 0.7–3.1)
LYMPHOCYTES NFR BLD AUTO: 24.7 % (ref 19.6–45.3)
MCH RBC QN AUTO: 22.6 PG (ref 26.6–33)
MCHC RBC AUTO-ENTMCNC: 28.2 G/DL (ref 31.5–35.7)
MCV RBC AUTO: 80 FL (ref 79–97)
MONOCYTES # BLD AUTO: 0.5 10*3/MM3 (ref 0.1–0.9)
MONOCYTES NFR BLD AUTO: 4.9 % (ref 5–12)
NEUTROPHILS NFR BLD AUTO: 6.92 10*3/MM3 (ref 1.7–7)
NEUTROPHILS NFR BLD AUTO: 67.1 % (ref 42.7–76)
NRBC BLD AUTO-RTO: 0 /100 WBC (ref 0–0.2)
PLAT MORPH BLD: NORMAL
PLATELET # BLD AUTO: 364 10*3/MM3 (ref 140–450)
PMV BLD AUTO: 10.6 FL (ref 6–12)
POTASSIUM SERPL-SCNC: 4.7 MMOL/L (ref 3.5–5.2)
PROT SERPL-MCNC: 7.1 G/DL (ref 6–8.5)
RBC # BLD AUTO: 4.65 10*6/MM3 (ref 4.14–5.8)
SODIUM SERPL-SCNC: 136 MMOL/L (ref 136–145)
WBC # BLD AUTO: 10.3 10*3/MM3 (ref 3.4–10.8)

## 2021-02-14 PROCEDURE — 85025 COMPLETE CBC W/AUTO DIFF WBC: CPT | Performed by: INTERNAL MEDICINE

## 2021-02-14 PROCEDURE — 63710000001 INSULIN ASPART PER 5 UNITS: Performed by: INTERNAL MEDICINE

## 2021-02-14 PROCEDURE — 94660 CPAP INITIATION&MGMT: CPT

## 2021-02-14 PROCEDURE — 82962 GLUCOSE BLOOD TEST: CPT

## 2021-02-14 PROCEDURE — 85007 BL SMEAR W/DIFF WBC COUNT: CPT | Performed by: INTERNAL MEDICINE

## 2021-02-14 PROCEDURE — 80053 COMPREHEN METABOLIC PANEL: CPT | Performed by: INTERNAL MEDICINE

## 2021-02-14 PROCEDURE — 99232 SBSQ HOSP IP/OBS MODERATE 35: CPT | Performed by: INTERNAL MEDICINE

## 2021-02-14 PROCEDURE — 86140 C-REACTIVE PROTEIN: CPT | Performed by: NURSE PRACTITIONER

## 2021-02-14 PROCEDURE — 94799 UNLISTED PULMONARY SVC/PX: CPT

## 2021-02-14 PROCEDURE — 63710000001 INSULIN DETEMIR PER 5 UNITS: Performed by: INTERNAL MEDICINE

## 2021-02-14 PROCEDURE — 25010000002 TIGECYCLINE: Performed by: PHYSICIAN ASSISTANT

## 2021-02-14 RX ORDER — PANTOPRAZOLE SODIUM 40 MG/1
40 TABLET, DELAYED RELEASE ORAL
Status: DISCONTINUED | OUTPATIENT
Start: 2021-02-14 | End: 2021-02-18 | Stop reason: HOSPADM

## 2021-02-14 RX ADMIN — INSULIN ASPART 10 UNITS: 100 INJECTION, SOLUTION INTRAVENOUS; SUBCUTANEOUS at 21:02

## 2021-02-14 RX ADMIN — SODIUM CHLORIDE, PRESERVATIVE FREE 10 ML: 5 INJECTION INTRAVENOUS at 20:55

## 2021-02-14 RX ADMIN — Medication 1 APPLICATION: at 20:56

## 2021-02-14 RX ADMIN — GABAPENTIN 400 MG: 400 CAPSULE ORAL at 21:03

## 2021-02-14 RX ADMIN — CHOLECALCIFEROL TAB 10 MCG (400 UNIT) 2000 UNITS: 10 TAB at 08:14

## 2021-02-14 RX ADMIN — SODIUM CHLORIDE 50 MG: 900 INJECTION INTRAVENOUS at 01:37

## 2021-02-14 RX ADMIN — SODIUM CHLORIDE, PRESERVATIVE FREE 10 ML: 5 INJECTION INTRAVENOUS at 08:16

## 2021-02-14 RX ADMIN — INSULIN ASPART 8 UNITS: 100 INJECTION, SOLUTION INTRAVENOUS; SUBCUTANEOUS at 08:14

## 2021-02-14 RX ADMIN — SODIUM HYPOCHLORITE: 1.25 SOLUTION TOPICAL at 20:53

## 2021-02-14 RX ADMIN — DULOXETINE HYDROCHLORIDE 60 MG: 60 CAPSULE, DELAYED RELEASE ORAL at 08:14

## 2021-02-14 RX ADMIN — ATORVASTATIN CALCIUM 10 MG: 10 TABLET, FILM COATED ORAL at 20:45

## 2021-02-14 RX ADMIN — Medication: at 08:15

## 2021-02-14 RX ADMIN — FAMOTIDINE 20 MG: 20 TABLET, FILM COATED ORAL at 08:15

## 2021-02-14 RX ADMIN — INSULIN ASPART 8 UNITS: 100 INJECTION, SOLUTION INTRAVENOUS; SUBCUTANEOUS at 12:09

## 2021-02-14 RX ADMIN — BACLOFEN 30 MG: 10 TABLET ORAL at 20:45

## 2021-02-14 RX ADMIN — SODIUM CHLORIDE 50 MG: 900 INJECTION INTRAVENOUS at 14:51

## 2021-02-14 RX ADMIN — BACLOFEN 30 MG: 10 TABLET ORAL at 12:08

## 2021-02-14 RX ADMIN — Medication 1 CAPSULE: at 08:14

## 2021-02-14 RX ADMIN — BACLOFEN 30 MG: 10 TABLET ORAL at 16:54

## 2021-02-14 RX ADMIN — PANTOPRAZOLE SODIUM 40 MG: 40 TABLET, DELAYED RELEASE ORAL at 16:54

## 2021-02-14 RX ADMIN — BACLOFEN 30 MG: 10 TABLET ORAL at 08:15

## 2021-02-14 RX ADMIN — Medication: at 18:26

## 2021-02-14 RX ADMIN — APIXABAN 5 MG: 5 TABLET, FILM COATED ORAL at 08:14

## 2021-02-14 RX ADMIN — INSULIN ASPART 8 UNITS: 100 INJECTION, SOLUTION INTRAVENOUS; SUBCUTANEOUS at 16:53

## 2021-02-14 RX ADMIN — ARIPIPRAZOLE 10 MG: 10 TABLET ORAL at 20:45

## 2021-02-14 RX ADMIN — GABAPENTIN 400 MG: 400 CAPSULE ORAL at 14:51

## 2021-02-14 RX ADMIN — APIXABAN 5 MG: 5 TABLET, FILM COATED ORAL at 20:45

## 2021-02-14 RX ADMIN — Medication: at 12:13

## 2021-02-14 RX ADMIN — SODIUM HYPOCHLORITE: 1.25 SOLUTION TOPICAL at 08:15

## 2021-02-14 RX ADMIN — INSULIN DETEMIR 10 UNITS: 100 INJECTION, SOLUTION SUBCUTANEOUS at 20:45

## 2021-02-14 RX ADMIN — DULOXETINE HYDROCHLORIDE 60 MG: 60 CAPSULE, DELAYED RELEASE ORAL at 20:45

## 2021-02-14 RX ADMIN — GABAPENTIN 400 MG: 400 CAPSULE ORAL at 06:09

## 2021-02-14 NOTE — PLAN OF CARE
Goal Outcome Evaluation:    Pt resting in bed. VSS on 2L NC. No shortness of breath or distress noted. Afebrile. A&Ox4. Call light in reach.

## 2021-02-14 NOTE — SIGNIFICANT NOTE
Asked pt twice  if he wanted to wear his CPAP. He refused and said he will let nurse know when he is ready to wear it.

## 2021-02-14 NOTE — PROGRESS NOTES
PROGRESS NOTE         Patient Identification:  Name:  Ken Krueger  Age:  45 y.o.  Sex:  male  :  1975  MRN:  [id number]  Visit Number:  [id number]  Primary Care Provider:  Provider, No Known         LOS: 7 days       ----------------------------------------------------------------------------------------------------------------------  Subjective       Chief Complaints:    Vomiting, Nausea, and Diarrhea        Interval History:      Patient is on 2 L nasal cannula in no apparent distress.  Nurses were at bedside adjusting his wound VAC. Denies any complaints at this time.  Afebrile.  No increased output from ostomy.  CRP is fairly stable at 3.43.  WBC remains normal.  Wound culture on 2021 finalized as scant growth of MDRO Acinetobacter and rare staph aureus.    Review of Systems:    Constitutional: no fever, chills and night sweats. No appetite change or unexpected weight change. No fatigue.  Eyes: no eye drainage, itching or redness.  HEENT: no mouth sores, dysphagia or nose bleed.  Respiratory: no for shortness of breath, cough or production of sputum.  Cardiovascular: no chest pain, no palpitations, no orthopnea.  Gastrointestinal: no nausea, vomiting or diarrhea. No abdominal pain, hematemesis or rectal bleeding.  Genitourinary: no dysuria or polyuria.  Hematologic/lymphatic: no lymph node abnormalities, no easy bruising or easy bleeding.  Musculoskeletal: no muscle or joint pain.  Skin: No rash and no itching.  Neurological: no loss of consciousness, no seizure, no headache.  Behavioral/Psych: no depression or suicidal ideation.  Endocrine: no hot flashes.  Immunologic: negative.    ----------------------------------------------------------------------------------------------------------------------      Objective       [facility] Meds:  apixaban, 5 mg, Oral, Q12H  ARIPiprazole, 10 mg, Oral, Nightly  atorvastatin, 10 mg, Oral, Nightly  baclofen, 30 mg, Oral, 4x Daily With Meals &  Nightly  cholecalciferol, 2,000 Units, Oral, Daily  DULoxetine, 60 mg, Oral, BID  famotidine, 20 mg, Oral, BID AC  gabapentin, 400 mg, Oral, Q8H  insulin aspart, 0-14 Units, Subcutaneous, TID AC  insulin detemir, 10 Units, Subcutaneous, Nightly  lactobacillus acidophilus, 1 capsule, Oral, Daily  magic barrier cream, , Topical, 4x Daily  sodium chloride, 10 mL, Intravenous, Q12H  sodium hypochlorite, , Topical, BID  tigecycline, 50 mg, Intravenous, Q12H      Pharmacy to dose vancomycin,       ----------------------------------------------------------------------------------------------------------------------    Vital Signs:  Temp:  [98.1 °F (36.7 °C)-98.6 °F (37 °C)] 98.4 °F (36.9 °C)  Heart Rate:  [71-99] 99  Resp:  [13-23] 13  BP: (119-169)/() 134/89  Mean Arterial Pressure (Non-Invasive) for the past 24 hrs (Last 3 readings):   Noninvasive MAP (mmHg)   02/14/21 1002 101   02/14/21 0902 106   02/14/21 0802 109     SpO2 Percentage    02/14/21 0802 02/14/21 0902 02/14/21 1002   SpO2: 99% 98% 96%     SpO2:  [91 %-100 %] 96 %  on  Flow (L/min):  [2] 2;   Device (Oxygen Therapy): nasal cannula    Body mass index is 39.2 kg/m².  Wt Readings from Last 3 Encounters:   02/14/21 127 kg (281 lb 1.4 oz)   06/02/20 (!) 150 kg (330 lb)   05/04/20 (!) 150 kg (330 lb)        Intake/Output Summary (Last 24 hours) at 2/14/2021 1130  Last data filed at 2/14/2021 0800  Gross per 24 hour   Intake 2023 ml   Output 2690 ml   Net -667 ml     Diet Regular; Consistent Carbohydrate  ----------------------------------------------------------------------------------------------------------------------      Physical Exam:    Constitutional:  Well-developed and well-nourished.  No respiratory distress.      HENT:  Head: Normocephalic and atraumatic.  Mouth:  Moist mucous membranes.    Eyes:  Conjunctivae and EOM are normal.  No scleral icterus.  Neck:  Neck supple.  No JVD present.    Cardiovascular:  Normal rate, regular rhythm and  normal heart sounds with no murmur. No edema.  Pulmonary/Chest:  No respiratory distress, no wheezes, no crackles, with normal breath sounds and good air movement.  Abdominal:  Soft.  Bowel sounds are normal.  Positive distention, no tenderness.  Colostomy and urostomy.  Musculoskeletal: Left AKA, right leg atrophy.  Neurological:  Alert and oriented to person, place, and time.  No facial droop.  No slurred speech.   Skin:  Decubitus ulcers.  Psychiatric:  Normal mood and affect.  Behavior is normal.    ----------------------------------------------------------------------------------------------------------------------  Results from last 7 days   Lab Units 02/07/21  1356   TROPONIN T ng/mL 0.029         Results from last 7 days   Lab Units 02/13/21  0905   PH, ARTERIAL pH units 7.423   PO2 ART mm Hg 86.6   PCO2, ARTERIAL mm Hg 44.7   HCO3 ART mmol/L 29.2*     Results from last 7 days   Lab Units 02/14/21  0221 02/13/21  0210 02/12/21  0053  02/08/21  0903  02/07/21  1952 02/07/21  1356   CRP mg/dL 3.43* 2.89* 3.18*   < >  --    < >  --  6.41*   LACTATE mmol/L  --   --   --   --  1.7  --  2.1* 3.4*   WBC 10*3/mm3 10.30 10.36 9.11   < > 9.64  --   --  17.88*   HEMOGLOBIN g/dL 10.5* 10.5* 10.5*   < > 10.5*  --   --  13.2   HEMATOCRIT % 37.2* 37.3* 37.5   < > 38.1  --   --  45.1   MCV fL 80.0 80.4 80.0   < > 81.2  --   --  77.2*   MCHC g/dL 28.2* 28.2* 28.0*   < > 27.6*  --   --  29.3*   PLATELETS 10*3/mm3 364 400 396   < > 459*  --   --  732*    < > = values in this interval not displayed.     Results from last 7 days   Lab Units 02/14/21  0221 02/13/21  0210 02/12/21  0053  02/09/21  0352 02/08/21  0902 02/07/21  1356   SODIUM mmol/L 136 138 138   < > 139 139 130*   POTASSIUM mmol/L 4.7 4.0 4.2   < > 3.2* 4.2 3.6   MAGNESIUM mg/dL  --   --   --   --   --  2.1 1.6   CHLORIDE mmol/L 99 102 103   < > 107 109* 92*   CO2 mmol/L 28.1 25.7 24.9   < > 21.2* 19.3* 22.0   BUN mg/dL 21* 15 16   < > 22* 48* 59*   CREATININE  mg/dL 0.72* 0.67* 0.74*   < > 0.66* 1.13 1.91*   EGFR IF NONAFRICN AM mL/min/1.73 119 129 115   < > 132 71 39*   CALCIUM mg/dL 9.4 9.5 9.4   < > 8.0* 7.8* 9.6   GLUCOSE mg/dL 324* 281* 313*   < > 163* 181* 327*   ALBUMIN g/dL 3.24*  --   --   --  2.88* 3.06* 4.02   BILIRUBIN mg/dL 0.2  --   --   --  0.3 0.4 0.5   ALK PHOS U/L 84  --   --   --  79 86 114   AST (SGOT) U/L 23  --   --   --  25 27 24   ALT (SGPT) U/L 29  --   --   --  22 27 40    < > = values in this interval not displayed.   Estimated Creatinine Clearance: 180.4 mL/min (A) (by C-G formula based on SCr of 0.72 mg/dL (L)).  No results found for: AMMONIA    Glucose   Date/Time Value Ref Range Status   02/14/2021 0611 266 (H) 70 - 130 mg/dL Final   02/13/2021 2107 290 (H) 70 - 130 mg/dL Final   02/13/2021 1715 270 (H) 70 - 130 mg/dL Final   02/13/2021 1212 327 (H) 70 - 130 mg/dL Final   02/13/2021 0616 318 (H) 70 - 130 mg/dL Final   02/12/2021 2346 278 (H) 70 - 130 mg/dL Final   02/12/2021 1640 289 (H) 70 - 130 mg/dL Final   02/12/2021 1037 343 (H) 70 - 130 mg/dL Final     Lab Results   Component Value Date    HGBA1C 8.90 (H) 10/19/2019     Lab Results   Component Value Date    TSH 1.160 02/07/2021       Blood Culture   Date Value Ref Range Status   02/07/2021 No growth at 3 days  Preliminary   02/07/2021 No growth at 3 days  Preliminary     Urine Culture   Date Value Ref Range Status   02/07/2021 (A)  Final    >100,000 CFU/mL Klebsiella pneumoniae ssp pneumoniae     No results found for: WOUNDCX  No results found for: STOOLCX  No results found for: RESPCX  Pain Management Panel     Pain Management Panel Latest Ref Rng & Units 8/19/2018 12/5/2017    AMPHETAMINES SCREEN, URINE Negative Negative Negative    BARBITURATES SCREEN Negative Negative Negative    BENZODIAZEPINE SCREEN, URINE Negative Negative Negative    BUPRENORPHINEUR Negative Negative Negative    COCAINE SCREEN, URINE Negative Negative Negative    METHADONE SCREEN, URINE Negative Negative  Negative            ----------------------------------------------------------------------------------------------------------------------  Imaging Results (Last 24 Hours)     ** No results found for the last 24 hours. **          ----------------------------------------------------------------------------------------------------------------------    Assessment/Plan       Assessment/Plan     ASSESSMENT:    1.  Severe sepsis with lactic acid greater than 2 on admission  2.  UTI    PLAN:    Patient is on 2 L nasal cannula in no apparent distress.  Nurses were at bedside adjusting his wound VAC. Denies any complaints at this time.  Afebrile.  No increased output from ostomy.  CRP is fairly stable at 3.43.  WBC remains normal.  Wound culture on 2/11/2021 finalized as scant growth of MDRO Acinetobacter and rare staph aureus.    Chest x-ray on 2/13/2021 showed no acute disease.  X-ray of the pelvis on 2/12/2021 showed chronic deformity in the right hip and no convincing evidence of an acute fracture.  X-ray of the right femur on 2/12/2021 showed chronic changes but no acute bony abnormality. Wound culture is so far showing growth of MDRO Acinetobacter baumannii and rare staph aureus. MRI of pelvis with and without contrast on 2/11/2021 showed old fracture of the proximal right femur.  There is surrounding fluid and minimal increased signal in the proximal right femur.  This would support a diagnosis of osteomyelitis.  Minimal fluid in left hip as well.      COVID-19 PCR negative.  Blood culture showed no growth thus far.  Lactic acid 3.4 on admission.  GI panel negative.  Urinalysis positive with urine culture finalizing as Klebsiella pneumoniae. CT of the abdomen and pelvis from 2/7/2021 reports multiple dilated loops of small bowel that may represent potential small bowel obstruction versus ileus.    As the wound culture finalized as MDRO Acinetobacter baumannii and MSSA, recommend to continue on Tygacil 50 mg IV q 12  hours.     Code Status:   Code Status and Medical Interventions:   Ordered at: 02/07/21 1753     Code Status:    CPR     Medical Interventions (Level of Support Prior to Arrest):    Full       KAYLAH Rhodes  02/14/21  11:30 EST

## 2021-02-14 NOTE — PLAN OF CARE
Goal Outcome Evaluation:        Outcome Summary: VSS without any c/o at this time.  Pt on 2 liters oxygen and has Cpap available for HS.  Wound Vac in place on coccyx other wound packed as ordered.  Call light in reach.  Will continue to monitor.

## 2021-02-15 LAB
ALBUMIN SERPL-MCNC: 2.89 G/DL (ref 3.5–5.2)
ALBUMIN/GLOB SERPL: 0.7 G/DL
ALP SERPL-CCNC: 83 U/L (ref 39–117)
ALT SERPL W P-5'-P-CCNC: 25 U/L (ref 1–41)
ANION GAP SERPL CALCULATED.3IONS-SCNC: 10.2 MMOL/L (ref 5–15)
ANISOCYTOSIS BLD QL: NORMAL
AST SERPL-CCNC: 18 U/L (ref 1–40)
BASOPHILS # BLD AUTO: 0.05 10*3/MM3 (ref 0–0.2)
BASOPHILS NFR BLD AUTO: 0.5 % (ref 0–1.5)
BILIRUB SERPL-MCNC: <0.2 MG/DL (ref 0–1.2)
BUN SERPL-MCNC: 21 MG/DL (ref 6–20)
BUN/CREAT SERPL: 30.9 (ref 7–25)
CALCIUM SPEC-SCNC: 8.9 MG/DL (ref 8.6–10.5)
CHLORIDE SERPL-SCNC: 101 MMOL/L (ref 98–107)
CO2 SERPL-SCNC: 23.8 MMOL/L (ref 22–29)
CREAT SERPL-MCNC: 0.68 MG/DL (ref 0.76–1.27)
CRP SERPL-MCNC: 3.07 MG/DL (ref 0–0.5)
DEPRECATED RDW RBC AUTO: 45.5 FL (ref 37–54)
EOSINOPHIL # BLD AUTO: 0.22 10*3/MM3 (ref 0–0.4)
EOSINOPHIL NFR BLD AUTO: 2.2 % (ref 0.3–6.2)
ERYTHROCYTE [DISTWIDTH] IN BLOOD BY AUTOMATED COUNT: 16 % (ref 12.3–15.4)
GFR SERPL CREATININE-BSD FRML MDRD: 127 ML/MIN/1.73
GLOBULIN UR ELPH-MCNC: 3.9 GM/DL
GLUCOSE BLDC GLUCOMTR-MCNC: 240 MG/DL (ref 70–130)
GLUCOSE BLDC GLUCOMTR-MCNC: 318 MG/DL (ref 70–130)
GLUCOSE BLDC GLUCOMTR-MCNC: 334 MG/DL (ref 70–130)
GLUCOSE BLDC GLUCOMTR-MCNC: 377 MG/DL (ref 70–130)
GLUCOSE SERPL-MCNC: 336 MG/DL (ref 65–99)
HCT VFR BLD AUTO: 35.1 % (ref 37.5–51)
HGB BLD-MCNC: 10 G/DL (ref 13–17.7)
HYPOCHROMIA BLD QL: NORMAL
IMM GRANULOCYTES # BLD AUTO: 0.09 10*3/MM3 (ref 0–0.05)
IMM GRANULOCYTES NFR BLD AUTO: 0.9 % (ref 0–0.5)
LYMPHOCYTES # BLD AUTO: 2.92 10*3/MM3 (ref 0.7–3.1)
LYMPHOCYTES NFR BLD AUTO: 28.8 % (ref 19.6–45.3)
MCH RBC QN AUTO: 22.5 PG (ref 26.6–33)
MCHC RBC AUTO-ENTMCNC: 28.5 G/DL (ref 31.5–35.7)
MCV RBC AUTO: 78.9 FL (ref 79–97)
MICROCYTES BLD QL: NORMAL
MONOCYTES # BLD AUTO: 0.5 10*3/MM3 (ref 0.1–0.9)
MONOCYTES NFR BLD AUTO: 4.9 % (ref 5–12)
NEUTROPHILS NFR BLD AUTO: 6.37 10*3/MM3 (ref 1.7–7)
NEUTROPHILS NFR BLD AUTO: 62.7 % (ref 42.7–76)
NRBC BLD AUTO-RTO: 0 /100 WBC (ref 0–0.2)
PLAT MORPH BLD: NORMAL
PLATELET # BLD AUTO: 371 10*3/MM3 (ref 140–450)
PMV BLD AUTO: 10.6 FL (ref 6–12)
POTASSIUM SERPL-SCNC: 4 MMOL/L (ref 3.5–5.2)
PROT SERPL-MCNC: 6.8 G/DL (ref 6–8.5)
RBC # BLD AUTO: 4.45 10*6/MM3 (ref 4.14–5.8)
SODIUM SERPL-SCNC: 135 MMOL/L (ref 136–145)
WBC # BLD AUTO: 10.15 10*3/MM3 (ref 3.4–10.8)

## 2021-02-15 PROCEDURE — 94799 UNLISTED PULMONARY SVC/PX: CPT

## 2021-02-15 PROCEDURE — 99232 SBSQ HOSP IP/OBS MODERATE 35: CPT | Performed by: INTERNAL MEDICINE

## 2021-02-15 PROCEDURE — 85007 BL SMEAR W/DIFF WBC COUNT: CPT | Performed by: INTERNAL MEDICINE

## 2021-02-15 PROCEDURE — 80053 COMPREHEN METABOLIC PANEL: CPT | Performed by: INTERNAL MEDICINE

## 2021-02-15 PROCEDURE — 63710000001 INSULIN ASPART PER 5 UNITS: Performed by: INTERNAL MEDICINE

## 2021-02-15 PROCEDURE — 86140 C-REACTIVE PROTEIN: CPT | Performed by: NURSE PRACTITIONER

## 2021-02-15 PROCEDURE — 82962 GLUCOSE BLOOD TEST: CPT

## 2021-02-15 PROCEDURE — 25010000002 TIGECYCLINE: Performed by: PHYSICIAN ASSISTANT

## 2021-02-15 PROCEDURE — 85025 COMPLETE CBC W/AUTO DIFF WBC: CPT | Performed by: INTERNAL MEDICINE

## 2021-02-15 RX ADMIN — INSULIN ASPART 5 UNITS: 100 INJECTION, SOLUTION INTRAVENOUS; SUBCUTANEOUS at 09:00

## 2021-02-15 RX ADMIN — SODIUM CHLORIDE 50 MG: 900 INJECTION INTRAVENOUS at 14:30

## 2021-02-15 RX ADMIN — GABAPENTIN 400 MG: 400 CAPSULE ORAL at 06:27

## 2021-02-15 RX ADMIN — BACLOFEN 30 MG: 10 TABLET ORAL at 23:53

## 2021-02-15 RX ADMIN — GABAPENTIN 400 MG: 400 CAPSULE ORAL at 14:30

## 2021-02-15 RX ADMIN — CHOLECALCIFEROL TAB 10 MCG (400 UNIT) 2000 UNITS: 10 TAB at 08:55

## 2021-02-15 RX ADMIN — BACLOFEN 30 MG: 10 TABLET ORAL at 17:43

## 2021-02-15 RX ADMIN — GABAPENTIN 400 MG: 400 CAPSULE ORAL at 23:59

## 2021-02-15 RX ADMIN — INSULIN ASPART 5 UNITS: 100 INJECTION, SOLUTION INTRAVENOUS; SUBCUTANEOUS at 12:11

## 2021-02-15 RX ADMIN — ARIPIPRAZOLE 10 MG: 10 TABLET ORAL at 23:54

## 2021-02-15 RX ADMIN — BACLOFEN 30 MG: 10 TABLET ORAL at 12:11

## 2021-02-15 RX ADMIN — ATORVASTATIN CALCIUM 10 MG: 10 TABLET, FILM COATED ORAL at 23:54

## 2021-02-15 RX ADMIN — APIXABAN 5 MG: 5 TABLET, FILM COATED ORAL at 23:54

## 2021-02-15 RX ADMIN — INSULIN ASPART 5 UNITS: 100 INJECTION, SOLUTION INTRAVENOUS; SUBCUTANEOUS at 17:43

## 2021-02-15 RX ADMIN — Medication 1 CAPSULE: at 08:55

## 2021-02-15 RX ADMIN — Medication: at 17:44

## 2021-02-15 RX ADMIN — Medication 1 APPLICATION: at 08:56

## 2021-02-15 RX ADMIN — SODIUM HYPOCHLORITE: 1.25 SOLUTION TOPICAL at 08:56

## 2021-02-15 RX ADMIN — APIXABAN 5 MG: 5 TABLET, FILM COATED ORAL at 08:55

## 2021-02-15 RX ADMIN — SODIUM CHLORIDE, PRESERVATIVE FREE 10 ML: 5 INJECTION INTRAVENOUS at 09:03

## 2021-02-15 RX ADMIN — BACLOFEN 30 MG: 10 TABLET ORAL at 08:55

## 2021-02-15 RX ADMIN — INSULIN ASPART 10 UNITS: 100 INJECTION, SOLUTION INTRAVENOUS; SUBCUTANEOUS at 12:11

## 2021-02-15 RX ADMIN — PANTOPRAZOLE SODIUM 40 MG: 40 TABLET, DELAYED RELEASE ORAL at 17:43

## 2021-02-15 RX ADMIN — INSULIN ASPART 12 UNITS: 100 INJECTION, SOLUTION INTRAVENOUS; SUBCUTANEOUS at 06:30

## 2021-02-15 RX ADMIN — DULOXETINE HYDROCHLORIDE 60 MG: 60 CAPSULE, DELAYED RELEASE ORAL at 08:55

## 2021-02-15 RX ADMIN — SODIUM CHLORIDE, PRESERVATIVE FREE 10 ML: 5 INJECTION INTRAVENOUS at 23:59

## 2021-02-15 RX ADMIN — SODIUM CHLORIDE 50 MG: 900 INJECTION INTRAVENOUS at 02:01

## 2021-02-15 RX ADMIN — DULOXETINE HYDROCHLORIDE 60 MG: 60 CAPSULE, DELAYED RELEASE ORAL at 23:53

## 2021-02-15 RX ADMIN — PANTOPRAZOLE SODIUM 40 MG: 40 TABLET, DELAYED RELEASE ORAL at 06:30

## 2021-02-15 RX ADMIN — Medication: at 12:11
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
## 2021-02-15 NOTE — PROGRESS NOTES
PROGRESS NOTE         Patient Identification:  Name:  Ken Krueger  Age:  45 y.o.  Sex:  male  :  1975  MRN:  [id number]  Visit Number:  [id number]  Primary Care Provider:  Provider, No Known         LOS: 8 days       ----------------------------------------------------------------------------------------------------------------------  Subjective       Chief Complaints:    Vomiting, Nausea, and Diarrhea        Interval History:      Patient is on 2 L nasal cannula in no apparent distress. Reports he is feeling okay today without any complaints. Afebrile, no diarrhea. CRP is improving at 3.07. WBC remains normal.     Review of Systems:    Constitutional: no fever, chills and night sweats. No appetite change or unexpected weight change. No fatigue.  Eyes: no eye drainage, itching or redness.  HEENT: no mouth sores, dysphagia or nose bleed.  Respiratory: no for shortness of breath, cough or production of sputum.  Cardiovascular: no chest pain, no palpitations, no orthopnea.  Gastrointestinal: no nausea, vomiting or diarrhea. No abdominal pain, hematemesis or rectal bleeding.  Genitourinary: no dysuria or polyuria.  Hematologic/lymphatic: no lymph node abnormalities, no easy bruising or easy bleeding.  Musculoskeletal: no muscle or joint pain.  Skin: No rash and no itching.  Neurological: no loss of consciousness, no seizure, no headache.  Behavioral/Psych: no depression or suicidal ideation.  Endocrine: no hot flashes.  Immunologic: negative.    ----------------------------------------------------------------------------------------------------------------------      Objective       Current [facility] Meds:  apixaban, 5 mg, Oral, Q12H  ARIPiprazole, 10 mg, Oral, Nightly  atorvastatin, 10 mg, Oral, Nightly  baclofen, 30 mg, Oral, 4x Daily With Meals & Nightly  cholecalciferol, 2,000 Units, Oral, Daily  DULoxetine, 60 mg, Oral, BID  gabapentin, 400 mg, Oral, Q8H  insulin aspart, 0-14 Units, Subcutaneous,  TID AC  insulin aspart, 5 Units, Subcutaneous, TID With Meals  insulin detemir, 10 Units, Subcutaneous, Nightly  lactobacillus acidophilus, 1 capsule, Oral, Daily  magic barrier cream, , Topical, 4x Daily  pantoprazole, 40 mg, Oral, BID AC  sodium chloride, 10 mL, Intravenous, Q12H  sodium hypochlorite, , Topical, BID  tigecycline, 50 mg, Intravenous, Q12H         ----------------------------------------------------------------------------------------------------------------------    Vital Signs:  Temp:  [97.1 °F (36.2 °C)-98 °F (36.7 °C)] 97.6 °F (36.4 °C)  Heart Rate:  [71-99] 75  Resp:  [11-27] 16  BP: (116-160)/(69-96) 133/76  Mean Arterial Pressure (Non-Invasive) for the past 24 hrs (Last 3 readings):   Noninvasive MAP (mmHg)   02/15/21 1402 98   02/15/21 1303 96   02/15/21 1202 101     SpO2 Percentage    02/15/21 1202 02/15/21 1303 02/15/21 1402   SpO2: 100% 100% 100%     SpO2:  [97 %-100 %] 100 %  on  Flow (L/min):  [2] 2;   Device (Oxygen Therapy): nasal cannula    Body mass index is 38.74 kg/m².  Wt Readings from Last 3 Encounters:   02/15/21 126 kg (277 lb 12.5 oz)   06/02/20 (!) 150 kg (330 lb)   05/04/20 (!) 150 kg (330 lb)        Intake/Output Summary (Last 24 hours) at 2/15/2021 1443  Last data filed at 2/15/2021 1300  Gross per 24 hour   Intake 1588 ml   Output 3525 ml   Net -1937 ml     Diet Regular; Consistent Carbohydrate  ----------------------------------------------------------------------------------------------------------------------      Physical Exam:    Constitutional:  Well-developed and well-nourished.  No respiratory distress.      HENT:  Head: Normocephalic and atraumatic.  Mouth:  Moist mucous membranes.    Eyes:  Conjunctivae and EOM are normal.  No scleral icterus.  Neck:  Neck supple.  No JVD present.    Cardiovascular:  Normal rate, regular rhythm and normal heart sounds with no murmur. No edema.  Pulmonary/Chest:  No respiratory distress, no wheezes, no crackles, with normal  breath sounds and good air movement.  Abdominal:  Soft.  Bowel sounds are normal.  Positive distention, no tenderness.  Colostomy and urostomy.  Musculoskeletal: Left AKA, right leg atrophy.  Neurological:  Alert and oriented to person, place, and time.  No facial droop.  No slurred speech.   Skin:  Decubitus ulcers.  Psychiatric:  Normal mood and affect.  Behavior is normal.    ----------------------------------------------------------------------------------------------------------------------          Results from last 7 days   Lab Units 02/13/21  0905   PH, ARTERIAL pH units 7.423   PO2 ART mm Hg 86.6   PCO2, ARTERIAL mm Hg 44.7   HCO3 ART mmol/L 29.2*     Results from last 7 days   Lab Units 02/15/21  0429 02/14/21  0221 02/13/21  0210   CRP mg/dL 3.07* 3.43* 2.89*   WBC 10*3/mm3 10.15 10.30 10.36   HEMOGLOBIN g/dL 10.0* 10.5* 10.5*   HEMATOCRIT % 35.1* 37.2* 37.3*   MCV fL 78.9* 80.0 80.4   MCHC g/dL 28.5* 28.2* 28.2*   PLATELETS 10*3/mm3 371 364 400     Results from last 7 days   Lab Units 02/15/21  0429 02/14/21  0221 02/13/21  0210  02/09/21  0352   SODIUM mmol/L 135* 136 138   < > 139   POTASSIUM mmol/L 4.0 4.7 4.0   < > 3.2*   CHLORIDE mmol/L 101 99 102   < > 107   CO2 mmol/L 23.8 28.1 25.7   < > 21.2*   BUN mg/dL 21* 21* 15   < > 22*   CREATININE mg/dL 0.68* 0.72* 0.67*   < > 0.66*   EGFR IF NONAFRICN AM mL/min/1.73 127 119 129   < > 132   CALCIUM mg/dL 8.9 9.4 9.5   < > 8.0*   GLUCOSE mg/dL 336* 324* 281*   < > 163*   ALBUMIN g/dL 2.89* 3.24*  --   --  2.88*   BILIRUBIN mg/dL <0.2 0.2  --   --  0.3   ALK PHOS U/L 83 84  --   --  79   AST (SGOT) U/L 18 23  --   --  25   ALT (SGPT) U/L 25 29  --   --  22    < > = values in this interval not displayed.   Estimated Creatinine Clearance: 189.4 mL/min (A) (by C-G formula based on SCr of 0.68 mg/dL (L)).  No results found for: AMMONIA    Glucose   Date/Time Value Ref Range Status   02/15/2021 1207 318 (H) 70 - 130 mg/dL Final   02/15/2021 0626 377 (H) 70 - 130  mg/dL Final   02/14/2021 2047 350 (H) 70 - 130 mg/dL Final   02/14/2021 1649 275 (H) 70 - 130 mg/dL Final   02/14/2021 1200 290 (H) 70 - 130 mg/dL Final   02/14/2021 0611 266 (H) 70 - 130 mg/dL Final   02/13/2021 2107 290 (H) 70 - 130 mg/dL Final   02/13/2021 1715 270 (H) 70 - 130 mg/dL Final     Lab Results   Component Value Date    HGBA1C 8.90 (H) 10/19/2019     Lab Results   Component Value Date    TSH 1.160 02/07/2021       Blood Culture   Date Value Ref Range Status   02/07/2021 No growth at 3 days  Preliminary   02/07/2021 No growth at 3 days  Preliminary     Urine Culture   Date Value Ref Range Status   02/07/2021 (A)  Final    >100,000 CFU/mL Klebsiella pneumoniae ssp pneumoniae     No results found for: WOUNDCX  No results found for: STOOLCX  No results found for: RESPCX  Pain Management Panel     Pain Management Panel Latest Ref Rng & Units 8/19/2018 12/5/2017    AMPHETAMINES SCREEN, URINE Negative Negative Negative    BARBITURATES SCREEN Negative Negative Negative    BENZODIAZEPINE SCREEN, URINE Negative Negative Negative    BUPRENORPHINEUR Negative Negative Negative    COCAINE SCREEN, URINE Negative Negative Negative    METHADONE SCREEN, URINE Negative Negative Negative            ----------------------------------------------------------------------------------------------------------------------  Imaging Results (Last 24 Hours)     ** No results found for the last 24 hours. **          ----------------------------------------------------------------------------------------------------------------------    Assessment/Plan       Assessment/Plan     ASSESSMENT:    1.  Severe sepsis with lactic acid greater than 2 on admission  2.  UTI    PLAN:    Patient is on 2 L nasal cannula in no apparent distress. Reports he is feeling okay today without any complaints. Afebrile, no diarrhea. CRP is improving at 3.07. WBC remains normal. Wound culture on 2/11/2021 finalized as scant growth of MDRO Acinetobacter and  rare staph aureus.    Chest x-ray on 2/13/2021 showed no acute disease.  X-ray of the pelvis on 2/12/2021 showed chronic deformity in the right hip and no convincing evidence of an acute fracture.  X-ray of the right femur on 2/12/2021 showed chronic changes but no acute bony abnormality. Wound culture is so far showing growth of MDRO Acinetobacter baumannii and rare staph aureus. MRI of pelvis with and without contrast on 2/11/2021 showed old fracture of the proximal right femur.  There is surrounding fluid and minimal increased signal in the proximal right femur.  This would support a diagnosis of osteomyelitis.  Minimal fluid in left hip as well.      COVID-19 PCR negative.  Blood culture finalized as no growth.  Lactic acid 3.4 on admission.  GI panel negative.  Urinalysis positive with urine culture finalizing as Klebsiella pneumoniae. CT of the abdomen and pelvis from 2/7/2021 reports multiple dilated loops of small bowel that may represent potential small bowel obstruction versus ileus.    As the wound culture finalized as MDRO Acinetobacter baumannii and MSSA, recommend to continue on Tygacil 50 mg IV q 12 hours through 3/14/2021.     Code Status:   Code Status and Medical Interventions:   Ordered at: 02/07/21 1753     Code Status:    CPR     Medical Interventions (Level of Support Prior to Arrest):    KAYLAH Pulido  02/15/21  14:43 EST

## 2021-02-15 NOTE — DISCHARGE PLACEMENT REQUEST
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 8.9 mg/dL      Total Protein 6.8 g/dL      Albumin 2.89 g/dL      ALT (SGPT) 25 U/L      AST (SGOT) 18 U/L      Alkaline Phosphatase 83 U/L      Total Bilirubin <0.2 mg/dL      eGFR Non African Amer 127 mL/min/1.73      Globulin 3.9 gm/dL      A/G Ratio 0.7 g/dL      BUN/Creatinine Ratio 30.9     Anion Gap 10.2 mmol/L     Narrative:      GFR Normal >60  Chronic Kidney Disease <60  Kidney Failure <15      CBC & Differential [[id number]]  (Abnormal) Collected: 02/15/21 0429    Specimen: Blood Updated: 02/15/21 0522    Narrative:      The following orders were created for panel order CBC & Differential.  Procedure                               Abnormality         Status                     ---------                               -----------         ------                     Scan Slide[[id number]]                                       Final result               CBC Auto Differential[[id number]]        Abnormal             Final result                 Please view results for these tests on the individual orders.    CBC Auto Differential [[id number]]  (Abnormal) Collected: 02/15/21 0429    Specimen: Blood Updated: 02/15/21 0522     WBC 10.15 10*3/mm3      RBC 4.45 10*6/mm3      Hemoglobin 10.0 g/dL      Hematocrit 35.1 %      MCV 78.9 fL      MCH 22.5 pg      MCHC 28.5 g/dL      RDW 16.0 %      RDW-SD 45.5 fl      MPV 10.6 fL      Platelets 371 10*3/mm3      Neutrophil % 62.7 %      Lymphocyte % 28.8 %      Monocyte % 4.9 %      Eosinophil % 2.2 %      Basophil % 0.5 %      Immature Grans % 0.9 %      Neutrophils, Absolute 6.37 10*3/mm3      Lymphocytes, Absolute 2.92 10*3/mm3      Monocytes, Absolute 0.50 10*3/mm3      Eosinophils, Absolute 0.22 10*3/mm3      Basophils, Absolute 0.05 10*3/mm3      Immature Grans, Absolute 0.09 10*3/mm3      nRBC 0.0 /100 WBC     Scan Slide [[id number]] Collected: 02/15/21 0429    Specimen: Blood Updated: 02/15/21 0522     Anisocytosis Slight/1+     Hypochromia Mod/2+     Microcytes Slight/1+     Platelet Morphology Normal    C-reactive Protein [[id number]]  (Abnormal) Collected: 02/15/21 0429    Specimen: Blood Updated: 02/15/21 0512     C-Reactive Protein 3.07 mg/dL     POC Glucose Once [[id number]]  (Abnormal) Collected: 02/14/21 2047    Specimen: Blood Updated: 02/14/21 2114     Glucose 350 mg/dL     POC Glucose Once [[id number]]  (Abnormal) Collected: 02/14/21 1649    Specimen: Blood Updated: 02/14/21 1702     Glucose 275 mg/dL         Referral Orders (last 24 hours) (24h ago, onward)    None        Consult Orders (last 24 hours) (24h ago, onward)     Start     Ordered    02/15/21 0821  PICC Consult  Once     Provider:  (Not yet assigned)    02/15/21 0821                   Physician Progress Notes (last 24 hours) (Notes from 02/14/21 1606 through 02/15/21 1606)      Christy Caro PA at 02/15/21 1443                     PROGRESS NOTE         Patient Identification:  Name:  Ken Krueger  Age:  45  y.o.  Sex:  male  :  1975  MRN:  [id number]  Visit Number:  [id number]  Primary Care Provider:  Provider, No Known         LOS: 8 days       ----------------------------------------------------------------------------------------------------------------------  Subjective       Chief Complaints:    Vomiting, Nausea, and Diarrhea        Interval History:      Patient is on 2 L nasal cannula in no apparent distress. Reports he is feeling okay today without any complaints. Afebrile, no diarrhea. CRP is improving at 3.07. WBC remains normal.     Review of Systems:    Constitutional: no fever, chills and night sweats. No appetite change or unexpected weight change. No fatigue.  Eyes: no eye drainage, itching or redness.  HEENT: no mouth sores, dysphagia or nose bleed.  Respiratory: no for shortness of breath, cough or production of sputum.  Cardiovascular: no chest pain, no palpitations, no orthopnea.  Gastrointestinal: no nausea, vomiting or diarrhea. No abdominal pain, hematemesis or rectal bleeding.  Genitourinary: no dysuria or polyuria.  Hematologic/lymphatic: no lymph node abnormalities, no easy bruising or easy bleeding.  Musculoskeletal: no muscle or joint pain.  Skin: No rash and no itching.  Neurological: no loss of consciousness, no seizure, no headache.  Behavioral/Psych: no depression or suicidal ideation.  Endocrine: no hot flashes.  Immunologic: negative.    ----------------------------------------------------------------------------------------------------------------------      Objective       Current Hospital Meds:  apixaban, 5 mg, Oral, Q12H  ARIPiprazole, 10 mg, Oral, Nightly  atorvastatin, 10 mg, Oral, Nightly  baclofen, 30 mg, Oral, 4x Daily With Meals & Nightly  cholecalciferol, 2,000 Units, Oral, Daily  DULoxetine, 60 mg, Oral, BID  gabapentin, 400 mg, Oral, Q8H  insulin aspart, 0-14 Units, Subcutaneous, TID AC  insulin aspart, 5 Units, Subcutaneous, TID With Meals  insulin detemir, 10  Units, Subcutaneous, Nightly  lactobacillus acidophilus, 1 capsule, Oral, Daily  magic barrier cream, , Topical, 4x Daily  pantoprazole, 40 mg, Oral, BID AC  sodium chloride, 10 mL, Intravenous, Q12H  sodium hypochlorite, , Topical, BID  tigecycline, 50 mg, Intravenous, Q12H         ----------------------------------------------------------------------------------------------------------------------    Vital Signs:  Temp:  [97.1 °F (36.2 °C)-98 °F (36.7 °C)] 97.6 °F (36.4 °C)  Heart Rate:  [71-99] 75  Resp:  [11-27] 16  BP: (116-160)/(69-96) 133/76  Mean Arterial Pressure (Non-Invasive) for the past 24 hrs (Last 3 readings):   Noninvasive MAP (mmHg)   02/15/21 1402 98   02/15/21 1303 96   02/15/21 1202 101     SpO2 Percentage    02/15/21 1202 02/15/21 1303 02/15/21 1402   SpO2: 100% 100% 100%     SpO2:  [97 %-100 %] 100 %  on  Flow (L/min):  [2] 2;   Device (Oxygen Therapy): nasal cannula    Body mass index is 38.74 kg/m².  Wt Readings from Last 3 Encounters:   02/15/21 126 kg (277 lb 12.5 oz)   06/02/20 (!) 150 kg (330 lb)   05/04/20 (!) 150 kg (330 lb)        Intake/Output Summary (Last 24 hours) at 2/15/2021 1443  Last data filed at 2/15/2021 1300  Gross per 24 hour   Intake 1588 ml   Output 3525 ml   Net -1937 ml     Diet Regular; Consistent Carbohydrate  ----------------------------------------------------------------------------------------------------------------------      Physical Exam:    Constitutional:  Well-developed and well-nourished.  No respiratory distress.      HENT:  Head: Normocephalic and atraumatic.  Mouth:  Moist mucous membranes.    Eyes:  Conjunctivae and EOM are normal.  No scleral icterus.  Neck:  Neck supple.  No JVD present.    Cardiovascular:  Normal rate, regular rhythm and normal heart sounds with no murmur. No edema.  Pulmonary/Chest:  No respiratory distress, no wheezes, no crackles, with normal breath sounds and good air movement.  Abdominal:  Soft.  Bowel sounds are normal.   Positive distention, no tenderness.  Colostomy and urostomy.  Musculoskeletal: Left AKA, right leg atrophy.  Neurological:  Alert and oriented to person, place, and time.  No facial droop.  No slurred speech.   Skin:  Decubitus ulcers.  Psychiatric:  Normal mood and affect.  Behavior is normal.    ----------------------------------------------------------------------------------------------------------------------          Results from last 7 days   Lab Units 02/13/21  0905   PH, ARTERIAL pH units 7.423   PO2 ART mm Hg 86.6   PCO2, ARTERIAL mm Hg 44.7   HCO3 ART mmol/L 29.2*     Results from last 7 days   Lab Units 02/15/21  0429 02/14/21  0221 02/13/21  0210   CRP mg/dL 3.07* 3.43* 2.89*   WBC 10*3/mm3 10.15 10.30 10.36   HEMOGLOBIN g/dL 10.0* 10.5* 10.5*   HEMATOCRIT % 35.1* 37.2* 37.3*   MCV fL 78.9* 80.0 80.4   MCHC g/dL 28.5* 28.2* 28.2*   PLATELETS 10*3/mm3 371 364 400     Results from last 7 days   Lab Units 02/15/21  0429 02/14/21  0221 02/13/21  0210 02/09/21  0352   SODIUM mmol/L 135* 136 138   < > 139   POTASSIUM mmol/L 4.0 4.7 4.0   < > 3.2*   CHLORIDE mmol/L 101 99 102   < > 107   CO2 mmol/L 23.8 28.1 25.7   < > 21.2*   BUN mg/dL 21* 21* 15   < > 22*   CREATININE mg/dL 0.68* 0.72* 0.67*   < > 0.66*   EGFR IF NONAFRICN AM mL/min/1.73 127 119 129   < > 132   CALCIUM mg/dL 8.9 9.4 9.5   < > 8.0*   GLUCOSE mg/dL 336* 324* 281*   < > 163*   ALBUMIN g/dL 2.89* 3.24*  --   --  2.88*   BILIRUBIN mg/dL <0.2 0.2  --   --  0.3   ALK PHOS U/L 83 84  --   --  79   AST (SGOT) U/L 18 23  --   --  25   ALT (SGPT) U/L 25 29  --   --  22    < > = values in this interval not displayed.   Estimated Creatinine Clearance: 189.4 mL/min (A) (by C-G formula based on SCr of 0.68 mg/dL (L)).  No results found for: AMMONIA    Glucose   Date/Time Value Ref Range Status   02/15/2021 1207 318 (H) 70 - 130 mg/dL Final   02/15/2021 0626 377 (H) 70 - 130 mg/dL Final   02/14/2021 2047 350 (H) 70 - 130 mg/dL Final   02/14/2021 1649 275  (H) 70 - 130 mg/dL Final   02/14/2021 1200 290 (H) 70 - 130 mg/dL Final   02/14/2021 0611 266 (H) 70 - 130 mg/dL Final   02/13/2021 2107 290 (H) 70 - 130 mg/dL Final   02/13/2021 1715 270 (H) 70 - 130 mg/dL Final     Lab Results   Component Value Date    HGBA1C 8.90 (H) 10/19/2019     Lab Results   Component Value Date    TSH 1.160 02/07/2021       Blood Culture   Date Value Ref Range Status   02/07/2021 No growth at 3 days  Preliminary   02/07/2021 No growth at 3 days  Preliminary     Urine Culture   Date Value Ref Range Status   02/07/2021 (A)  Final    >100,000 CFU/mL Klebsiella pneumoniae ssp pneumoniae     No results found for: WOUNDCX  No results found for: STOOLCX  No results found for: RESPCX  Pain Management Panel     Pain Management Panel Latest Ref Rng & Units 8/19/2018 12/5/2017    AMPHETAMINES SCREEN, URINE Negative Negative Negative    BARBITURATES SCREEN Negative Negative Negative    BENZODIAZEPINE SCREEN, URINE Negative Negative Negative    BUPRENORPHINEUR Negative Negative Negative    COCAINE SCREEN, URINE Negative Negative Negative    METHADONE SCREEN, URINE Negative Negative Negative            ----------------------------------------------------------------------------------------------------------------------  Imaging Results (Last 24 Hours)     ** No results found for the last 24 hours. **          ----------------------------------------------------------------------------------------------------------------------    Assessment/Plan       Assessment/Plan     ASSESSMENT:    1.  Severe sepsis with lactic acid greater than 2 on admission  2.  UTI    PLAN:    Patient is on 2 L nasal cannula in no apparent distress. Reports he is feeling okay today without any complaints. Afebrile, no diarrhea. CRP is improving at 3.07. WBC remains normal. Wound culture on 2/11/2021 finalized as scant growth of MDRO Acinetobacter and rare staph aureus.    Chest x-ray on 2/13/2021 showed no acute disease.  X-ray of  the pelvis on 2/12/2021 showed chronic deformity in the right hip and no convincing evidence of an acute fracture.  X-ray of the right femur on 2/12/2021 showed chronic changes but no acute bony abnormality. Wound culture is so far showing growth of MDRO Acinetobacter baumannii and rare staph aureus. MRI of pelvis with and without contrast on 2/11/2021 showed old fracture of the proximal right femur.  There is surrounding fluid and minimal increased signal in the proximal right femur.  This would support a diagnosis of osteomyelitis.  Minimal fluid in left hip as well.      COVID-19 PCR negative.  Blood culture finalized as no growth.  Lactic acid 3.4 on admission.  GI panel negative.  Urinalysis positive with urine culture finalizing as Klebsiella pneumoniae. CT of the abdomen and pelvis from 2/7/2021 reports multiple dilated loops of small bowel that may represent potential small bowel obstruction versus ileus.    As the wound culture finalized as MDRO Acinetobacter baumannii and MSSA, recommend to continue on Tygacil 50 mg IV q 12 hours through 3/14/2021.     Code Status:   Code Status and Medical Interventions:   Ordered at: 02/07/21 1753     Code Status:    CPR     Medical Interventions (Level of Support Prior to Arrest):    Full       KAYLAH Rhodes  02/15/21  14:43 EST      Electronically signed by Christy Caro PA at 02/15/21 1513     Javier Moscoso DO at 02/15/21 1351          Subjective     History:   Ken Krueger is a 45 y.o. male admitted on 2/7/2021 secondary to Severe sepsis (CMS/[facility])     Procedures:   2/12/21: Left midline catheter placement placement     CC: Follow up severe sepsis     Patient seen and examined with SHAHNAZ Rivas. Awake and alert. Denies any current complaints. States he feels much better compared to upon admission. Denies CP, dyspnea or cough. Denies nausea or vomiting. Tolerating PO intake. States he did not wear the CPAP last PM as he has not been to sleep yet. No  acute events overnight per RN.     History taken from: patient, chart, and RN.      Objective     Vital Signs  Temp:  [97.1 °F (36.2 °C)-98 °F (36.7 °C)] 97.6 °F (36.4 °C)  Heart Rate:  [71-99] 72  Resp:  [11-27] 16  BP: (116-160)/(69-96) 136/74    Intake/Output Summary (Last 24 hours) at 2/15/2021 1352  Last data filed at 2/15/2021 1300  Gross per 24 hour   Intake 1588 ml   Output 3925 ml   Net -2337 ml         Physical Exam:  General:    Awake, alert, in no acute distress   Heart:      Normal S1 and S2. Regular rate and rhythm. No significant murmur, rubs or gallops appreciated.   Lungs:     Respirations regular, even and unlabored. Lungs clear to auscultation B/L. No wheezes, rales or rhonchi.   Abdomen:   Soft and nontender. No guarding, rebound tenderness or  organomegaly noted. Bowel sounds present x 4. (+) urostomy and colostomy.    Extremities:  (+) AKA. No clubbing, cyanosis or edema noted in RLE.      Results Review:    Results from last 7 days   Lab Units 02/15/21  0429 02/14/21  0221 02/13/21  0210 02/12/21  0053 02/11/21  0051 02/10/21  0033 02/09/21  0352   WBC 10*3/mm3 10.15 10.30 10.36 9.11 9.01 8.06 7.19   HEMOGLOBIN g/dL 10.0* 10.5* 10.5* 10.5* 10.6* 10.3* 9.9*   PLATELETS 10*3/mm3 371 364 400 396 393 404 409     Results from last 7 days   Lab Units 02/15/21  0429 02/14/21  0221 02/13/21  0210 02/12/21  0053 02/11/21  0051 02/10/21  1720 02/10/21  0033  02/09/21  0352   SODIUM mmol/L 135* 136 138 138 128*  --  137  --  139   POTASSIUM mmol/L 4.0 4.7 4.0 4.2 3.8 4.0 3.5   < > 3.2*   CHLORIDE mmol/L 101 99 102 103 95*  --  104  --  107   CO2 mmol/L 23.8 28.1 25.7 24.9 23.0  --  22.3  --  21.2*   BUN mg/dL 21* 21* 15 16 13  --  15  --  22*   CREATININE mg/dL 0.68* 0.72* 0.67* 0.74* 0.62*  --  0.82  --  0.66*   CALCIUM mg/dL 8.9 9.4 9.5 9.4 9.9  --  9.0  --  8.0*   GLUCOSE mg/dL 336* 324* 281* 313* 235*  --  211*  --  163*    < > = values in this interval not displayed.     Results from last 7 days    Lab Units 02/15/21  0429 02/14/21  0221 02/09/21  0352   BILIRUBIN mg/dL <0.2 0.2 0.3   ALK PHOS U/L 83 84 79   AST (SGOT) U/L 18 23 25   ALT (SGPT) U/L 25 29 22                   Imaging Results (Last 24 Hours)     ** No results found for the last 24 hours. **            Medications:  apixaban, 5 mg, Oral, Q12H  ARIPiprazole, 10 mg, Oral, Nightly  atorvastatin, 10 mg, Oral, Nightly  baclofen, 30 mg, Oral, 4x Daily With Meals & Nightly  cholecalciferol, 2,000 Units, Oral, Daily  DULoxetine, 60 mg, Oral, BID  gabapentin, 400 mg, Oral, Q8H  insulin aspart, 0-14 Units, Subcutaneous, TID AC  insulin aspart, 5 Units, Subcutaneous, TID With Meals  insulin detemir, 10 Units, Subcutaneous, Nightly  lactobacillus acidophilus, 1 capsule, Oral, Daily  magic barrier cream, , Topical, 4x Daily  pantoprazole, 40 mg, Oral, BID AC  sodium chloride, 10 mL, Intravenous, Q12H  sodium hypochlorite, , Topical, BID  tigecycline, 50 mg, Intravenous, Q12H               Assessment/Plan   Severe sepsis: Likely 2/2 complicated UTI and osteomyelitis associated with unstageable decubitus ulcers. Afebrile and hemodynamically stable. Leukocytosis has resolved with stable WBC today. CRP improved and stable today. Lactate normalized soon upon admission. Urine culture revealed Klebsiella pneumoniae and course of Rocephin completed. Wound culture revealing scant growth of Acinetobacter and currently on Tygacil per ID recs. Repeat labs in the AM. ID input appreciated.     Klebsiella UTI: Course of Rocephin completed.     Osteomyelitis associated with decubitus ulcers: MRI reveals old fracture of the proximal right femur with surrounding fluid and minimal increased signal in the proximal right femur concerning for osteomyelitis. Cont wound vac. Cont Tygacil per ID recs. Midline placed and I have ordered PICC consult as pt will likely require prolonged course of IV antibiotics. ID input appreciated.     Nausea and vomiting with concern for ileus vs  SBO: CT abd/pelvis revealed multiple dilated loops of bowel and significant free fluid. Surgery consulted who felt his presentation may be more consistent with gastroenteritis. Diet has been advanced and he is tolerating PO intake well. Surgery input appreciated.     JAKE: Possibly 2/2 above. Improved with IV fluids and treatment of sepsis. Creatine stable today. Cont to monitor.     Chronic right femur fracture: Ortho consulted with recs to continue current management.    DM II, insulin dependent with hyperglycemia: HgbA1c 8.9. Cont basal and sliding scale. Add meal time insulin. Cont to monitor.     Acute hypoxic respiratory failure: Episodes of desaturation noted on home CPAP. Noncompliance with CPAP noted as well. Pt encouraged to use hospital CPAP with home settings to see if he needs adjustments or supplemental O2. Cont to encourage use of CPAP as pt did not wear CPAP last PM.     Hypokalemia: Improved with supplementation. Mg initially <2 but improved with supplementation.     Hx of PE and DVT: Cont Eliquis.     DVT PPX: Eliquis    Transfer to med/surg.       Javier Moscoso DO  02/15/21  13:52 EST    Electronically signed by Javier Moscoso DO at 02/15/21 1414     Brannon Adrian MD at 21 2126              [facility] HOSPITALIST PROGRESS NOTE     Patient Identification:  Name:  Ken Krueger  Age:  45 y.o.  Sex:  male  :  1975  MRN:  [id number]  Visit Number:  [id number]  ROOM: [address]     Primary Care Provider:  Provider, No Known    Length of stay in inpatient status:  7    Subjective     Chief Compliant:    Chief Complaint   Patient presents with   • Vomiting   • Nausea   • Diarrhea       History of Presenting Illness:    Patient denies any new complaints. No desaturation events noted. Patient still appears to be noncompliant to CPAP at times. Encouraged compliance.     ROS:  Otherwise 10 point ROS negative other than documented above in HPI.     Objective     Current  [facility] Meds:apixaban, 5 mg, Oral, Q12H  ARIPiprazole, 10 mg, Oral, Nightly  atorvastatin, 10 mg, Oral, Nightly  baclofen, 30 mg, Oral, 4x Daily With Meals & Nightly  cholecalciferol, 2,000 Units, Oral, Daily  DULoxetine, 60 mg, Oral, BID  gabapentin, 400 mg, Oral, Q8H  insulin aspart, 0-14 Units, Subcutaneous, TID AC  insulin detemir, 10 Units, Subcutaneous, Nightly  lactobacillus acidophilus, 1 capsule, Oral, Daily  magic barrier cream, , Topical, 4x Daily  pantoprazole, 40 mg, Oral, BID AC  sodium chloride, 10 mL, Intravenous, Q12H  sodium hypochlorite, , Topical, BID  tigecycline, 50 mg, Intravenous, Q12H    Pharmacy to dose vancomycin,         Current Antimicrobial Therapy:  Anti-Infectives (From admission, onward)    Ordered     Dose/Rate Route Frequency Start Stop    02/13/21 1213  tigecycline (TYGACIL) 50 mg/100 mL 0.9% NS IVPB (mbp)     Ordering Provider: Christy Caro PA    50 mg  over 30 Minutes Intravenous Every 12 Hours 02/14/21 0200 03/14/21 0159    02/13/21 1213  tigecycline (TYGACIL) 100 mg in 100 ml NS     Ordering Provider: Christy Caro PA    100 mg  over 30 Minutes Intravenous Once 02/13/21 1400 02/13/21 1429    02/12/21 1004  vancomycin 2000 mg/500 mL 0.9% NS IVPB ([facility])     Ordering Provider: Tra Jain MD    2,000 mg  over 120 Minutes Intravenous Once 02/12/21 1200 02/12/21 1534    02/12/21 1001  Pharmacy to dose vancomycin     Ordering Provider: Tra Jain MD     Does not apply Continuous PRN 02/12/21 1001 03/12/21 1000    02/07/21 1555  vancomycin 2000 mg/500 mL 0.9% NS IVPB ([facility])     Ordering Provider: Bryce Farrar PA    2,000 mg  over 120 Minutes Intravenous Once 02/07/21 1630 02/07/21 2021    02/07/21 1547  aztreonam (AZACTAM) 2 g/100 mL 0.9% NS (mbp)     Ordering Provider: Bryce Farrar PA    2 g  over 30 Minutes Intravenous Once 02/07/21 1549 02/07/21 1800        Current Diuretic Therapy:  Diuretics (From admission, onward)    None         ----------------------------------------------------------------------------------------------------------------------  Vital Signs:  Temp:  [97.1 °F (36.2 °C)-98.4 °F (36.9 °C)] 97.1 °F (36.2 °C)  Heart Rate:  [] 92  Resp:  [11-27] 11  BP: (122-153)/(68-95) 139/81  SpO2:  [96 %-100 %] 98 %  on  Flow (L/min):  [2] 2;   Device (Oxygen Therapy): nasal cannula  Body mass index is 39.2 kg/m².    Wt Readings from Last 3 Encounters:   02/14/21 127 kg (281 lb 1.4 oz)   06/02/20 (!) 150 kg (330 lb)   05/04/20 (!) 150 kg (330 lb)     Intake & Output (last 3 days)       02/12 0701 - 02/13 0700 02/13 0701 - 02/14 0700 02/14 0701 - 02/15 0700    P.O. 840 1973 1035    I.V. (mL/kg) 599 (4.6)  0 (0)    IV Piggyback 600 200     Total Intake(mL/kg) 2039 (15.7) 2173 (17.1) 1035 (8.1)    Urine (mL/kg/hr) 3090 (1) 2990 (1) 2150 (1.2)    Emesis/NG output       Stool 1050      Total Output 4140 2990 2150    Net -0983 -563 -7826               Diet Regular; Consistent Carbohydrate  ----------------------------------------------------------------------------------------------------------------------  Physical exam:  Constitutional:  Well-developed and well-nourished.  No respiratory distress. Obese.   HENT:  Head:  Normocephalic and atraumatic.  Mouth:  Moist mucous membranes.    Eyes:  Conjunctivae and EOM are normal. No scleral icterus.    Neck:  Neck supple.  No JVD present.    Cardiovascular:  Normal rate, regular rhythm and normal heart sounds with no murmur.  Pulmonary/Chest:  No respiratory distress, no wheezes, no crackles, with normal breath sounds and good air movement.  Abdominal:  Soft.  Bowel sounds are normal.  No distension and no tenderness.     Neurological:  Alert and oriented to person, place, and time.  No cranial nerve deficit.  No tongue deviation.  No facial droop.  No slurred speech.   Skin:  Skin is warm and dry. No rash noted. No pallor.   Peripheral vascular:  Pulses in all 4 extremities with no  clubbing, no cyanosis, no edema.  ----------------------------------------------------------------------------------------------------------------------  Tele:    ----------------------------------------------------------------------------------------------------------------------  Results from last 7 days   Lab Units 02/14/21 0221 02/13/21 0210 02/12/21 0053 02/08/21 0903   CRP mg/dL 3.43* 2.89* 3.18*   < >  --    LACTATE mmol/L  --   --   --   --  1.7   WBC 10*3/mm3 10.30 10.36 9.11   < > 9.64   HEMOGLOBIN g/dL 10.5* 10.5* 10.5*   < > 10.5*   HEMATOCRIT % 37.2* 37.3* 37.5   < > 38.1   MCV fL 80.0 80.4 80.0   < > 81.2   MCHC g/dL 28.2* 28.2* 28.0*   < > 27.6*   PLATELETS 10*3/mm3 364 400 396   < > 459*    < > = values in this interval not displayed.     Results from last 7 days   Lab Units 02/13/21 0905   PH, ARTERIAL pH units 7.423   PO2 ART mm Hg 86.6   PCO2, ARTERIAL mm Hg 44.7   HCO3 ART mmol/L 29.2*     Results from last 7 days   Lab Units 02/14/21 0221 02/13/21 0210 02/12/21  0053  02/09/21  0352 02/08/21  0902   SODIUM mmol/L 136 138 138   < > 139 139   POTASSIUM mmol/L 4.7 4.0 4.2   < > 3.2* 4.2   MAGNESIUM mg/dL  --   --   --   --   --  2.1   CHLORIDE mmol/L 99 102 103   < > 107 109*   CO2 mmol/L 28.1 25.7 24.9   < > 21.2* 19.3*   BUN mg/dL 21* 15 16   < > 22* 48*   CREATININE mg/dL 0.72* 0.67* 0.74*   < > 0.66* 1.13   EGFR IF NONAFRICN AM mL/min/1.73 119 129 115   < > 132 71   CALCIUM mg/dL 9.4 9.5 9.4   < > 8.0* 7.8*   PHOSPHORUS mg/dL  --   --   --   --   --  3.0   GLUCOSE mg/dL 324* 281* 313*   < > 163* 181*   ALBUMIN g/dL 3.24*  --   --   --  2.88* 3.06*   BILIRUBIN mg/dL 0.2  --   --   --  0.3 0.4   ALK PHOS U/L 84  --   --   --  79 86   AST (SGOT) U/L 23  --   --   --  25 27   ALT (SGPT) U/L 29  --   --   --  22 27    < > = values in this interval not displayed.   Estimated Creatinine Clearance: 180.4 mL/min (A) (by C-G formula based on SCr of 0.72 mg/dL (L)).  No results found for:  AMMONIA              Glucose   Date/Time Value Ref Range Status   02/14/2021 2047 350 (H) 70 - 130 mg/dL Final   02/14/2021 1649 275 (H) 70 - 130 mg/dL Final   02/14/2021 1200 290 (H) 70 - 130 mg/dL Final   02/14/2021 0611 266 (H) 70 - 130 mg/dL Final   02/13/2021 2107 290 (H) 70 - 130 mg/dL Final   02/13/2021 1715 270 (H) 70 - 130 mg/dL Final   02/13/2021 1212 327 (H) 70 - 130 mg/dL Final   02/13/2021 0616 318 (H) 70 - 130 mg/dL Final     Lab Results   Component Value Date    TSH 1.160 02/07/2021     No results found for: PREGTESTUR, PREGSERUM, HCG, HCGQUANT  Pain Management Panel     Pain Management Panel Latest Ref Rng & Units 8/19/2018 12/5/2017    AMPHETAMINES SCREEN, URINE Negative Negative Negative    BARBITURATES SCREEN Negative Negative Negative    BENZODIAZEPINE SCREEN, URINE Negative Negative Negative    BUPRENORPHINEUR Negative Negative Negative    COCAINE SCREEN, URINE Negative Negative Negative    METHADONE SCREEN, URINE Negative Negative Negative        Brief Urine Lab Results  (Last result in the past 365 days)      Color   Clarity   Blood   Leuk Est   Nitrite   Protein   CREAT   Urine HCG        02/07/21 1826 Dark Yellow Cloudy Negative Large (3+) Negative 100 mg/dL (2+)             No results found for: BLOODCX  No results found for: URINECX  Wound Culture   Date Value Ref Range Status   02/11/2021 Scant growth (1+) Acinetobacter baumannii MDRO (C)  Preliminary     Comment:     Multi drug resistant Acinetobacter, patient may be an isolation risk.  Sent to [facility] for Colistin and Polymyxin B    Multi drug resistant Acinetobacter, patient may be an isolation risk.   02/11/2021 Rare Staphylococcus aureus (A)  Preliminary     No results found for: STOOLCX  No results found for: RESPCX  No results found for: AFBCX  Results from last 7 days   Lab Units 02/14/21  0221 02/13/21  0210 02/12/21  0053 02/11/21  0051 02/10/21  0033 02/08/21  0903 02/08/21  0902   LACTATE mmol/L  --   --   --   --   --  1.7  --     CRP mg/dL 3.43* 2.89* 3.18* 3.50* 5.83*  --  8.79*       I have personally looked at the labs and they are summarized above.  ----------------------------------------------------------------------------------------------------------------------  Detailed radiology reports for the last 24 hours:    Imaging Results (Last 24 Hours)     ** No results found for the last 24 hours. **        Assessment & Plan    #Septic shock 2/2 UTI  - Urine culture growing Klebsiella pneumonia. Sensitivities reviewed. ID consulted.   - Abx transitioned to rocephin earlier in stay. Has completed treatment for UTI.     #Unstagable decubitus ulcers  #Hx of osteomyelitis associated with decubitus ulcers  #Paraplegia 2/2 MA  - Compared to prior pictures, ulcers appear deeper and redness more pronounced. Some drainage noted on exam. Patient also noted new purulent smelling drainage prior to presentation.  - Given improvement on rocphin, suspect sepsis was 2/2 UTI but cannot rule out recurrent acute osteomyelitis at presentation. MRI obtained and consistent with osteo and again noted fracture.   - ID plans to treat for osteo again. Wound growing MDRO acinetobacter baumanni. Abx transferred to tygacil.   - PICC line ordered. Midline placed 2/12.   - Wound culture growing scant growth gram negative bacilli.   - Wound care APRN consulted. Ostomy care. Supportive care.     #Ileus vs. Possible small bowel obstruction   - CT the abdomen and pelvis without contrast showed multiple dilated loops of bowel, no significant free fluid, possibly representing potential small bowel stricture versus ileus  - Patient now passing liquid stool in osotomy   - Surgery consulted. Appreciate recs. Diet advanced.     #Chronic appearing right femur fracture  - Orthopedic surgery consulted as per ID recs.     #JAKE  - Prerenal vs. ATN from sepsis. CT did not reveal evidence of obstructive uropathy.   - Cr has normalized.     #Hypokalemia  - Continue to replace as per  protocol     #DM II  - SSI    #ARLIN on home CPAP   - Patient continues to have desaturation events on home CPAP while sleeping. No hypoxia while awake.   - Will start CPAP with our machine so we can titrate settings if needed. Will start with home pressure of 15. Can also transition to BiPAP if needed.  Patient may need supplemental O2 with home CPAP as he currently uses it with room air.   - Will consider HFNC as an alternative option while inpatient as it can deliver PEEP also.   - ABG and chest x-ray wnl.       F: PO  E: Replace as needed   N: Regular     Code status: Full     Dispo: Pending clinical improvement     VTE Prophylaxis:   Mechanical Order History:     None      Pharmalogical Order History:      Ordered     Dose Route Frequency Stop    02/09/21 0733  apixaban (ELIQUIS) tablet 5 mg      5 mg PO Every 12 Hours Scheduled --                Brannon Adrian MD  [facility]  02/14/21  21:26 EST    Electronically signed by Brannon Adrian MD at 02/14/21 [id number]

## 2021-02-15 NOTE — SIGNIFICANT NOTE
Told pt importance of wearing cpap. He said he was waiting for a bath and would have RN call when ready to wear.

## 2021-02-15 NOTE — PROGRESS NOTES
[facility] HOSPITALIST PROGRESS NOTE     Patient Identification:  Name:  Ken Krueger  Age:  45 y.o.  Sex:  male  :  1975  MRN:  [id number]  Visit Number:  [id number]  ROOM: [address]     Primary Care Provider:  Provider, No Known    Length of stay in inpatient status:  7    Subjective     Chief Compliant:    Chief Complaint   Patient presents with   • Vomiting   • Nausea   • Diarrhea       History of Presenting Illness:    Patient denies any new complaints. No desaturation events noted. Patient still appears to be noncompliant to CPAP at times. Encouraged compliance.     ROS:  Otherwise 10 point ROS negative other than documented above in HPI.     Objective     Current Hospital Meds:apixaban, 5 mg, Oral, Q12H  ARIPiprazole, 10 mg, Oral, Nightly  atorvastatin, 10 mg, Oral, Nightly  baclofen, 30 mg, Oral, 4x Daily With Meals & Nightly  cholecalciferol, 2,000 Units, Oral, Daily  DULoxetine, 60 mg, Oral, BID  gabapentin, 400 mg, Oral, Q8H  insulin aspart, 0-14 Units, Subcutaneous, TID AC  insulin detemir, 10 Units, Subcutaneous, Nightly  lactobacillus acidophilus, 1 capsule, Oral, Daily  magic barrier cream, , Topical, 4x Daily  pantoprazole, 40 mg, Oral, BID AC  sodium chloride, 10 mL, Intravenous, Q12H  sodium hypochlorite, , Topical, BID  tigecycline, 50 mg, Intravenous, Q12H    Pharmacy to dose vancomycin,         Current Antimicrobial Therapy:  Anti-Infectives (From admission, onward)    Ordered     Dose/Rate Route Frequency Start Stop    21 1213  tigecycline (TYGACIL) 50 mg/100 mL 0.9% NS IVPB (mbp)     Ordering Provider: Christy Caro PA    50 mg  over 30 Minutes Intravenous Every 12 Hours 21 0200 21 0159    21 1213  tigecycline (TYGACIL) 100 mg in 100 ml NS     Ordering Provider: Christy Caro PA    100 mg  over 30 Minutes Intravenous Once 21 1400 21 1429    21 1004  vancomycin 2000 mg/500 mL 0.9% NS IVPB ([facility])     Ordering Provider:  Tra Jain MD    2,000 mg  over 120 Minutes Intravenous Once 02/12/21 1200 02/12/21 1534    02/12/21 1001  Pharmacy to dose vancomycin     Ordering Provider: Tra Jain MD     Does not apply Continuous PRN 02/12/21 1001 03/12/21 1000    02/07/21 1555  vancomycin 2000 mg/500 mL 0.9% NS IVPB ([facility])     Ordering Provider: Bryce Farrar PA    2,000 mg  over 120 Minutes Intravenous Once 02/07/21 1630 02/07/21 2021    02/07/21 1547  aztreonam (AZACTAM) 2 g/100 mL 0.9% NS (mbp)     Ordering Provider: Bryce Farrar PA    2 g  over 30 Minutes Intravenous Once 02/07/21 1549 02/07/21 1800        Current Diuretic Therapy:  Diuretics (From admission, onward)    None        ----------------------------------------------------------------------------------------------------------------------  Vital Signs:  Temp:  [97.1 °F (36.2 °C)-98.4 °F (36.9 °C)] 97.1 °F (36.2 °C)  Heart Rate:  [] 92  Resp:  [11-27] 11  BP: (122-153)/(68-95) 139/81  SpO2:  [96 %-100 %] 98 %  on  Flow (L/min):  [2] 2;   Device (Oxygen Therapy): nasal cannula  Body mass index is 39.2 kg/m².    Wt Readings from Last 3 Encounters:   02/14/21 127 kg (281 lb 1.4 oz)   06/02/20 (!) 150 kg (330 lb)   05/04/20 (!) 150 kg (330 lb)     Intake & Output (last 3 days)       02/12 0701 - 02/13 0700 02/13 0701 - 02/14 0700 02/14 0701 - 02/15 0700    P.O. 840 1973 1035    I.V. (mL/kg) 599 (4.6)  0 (0)    IV Piggyback 600 200     Total Intake(mL/kg) 2039 (15.7) 2173 (17.1) 1035 (8.1)    Urine (mL/kg/hr) 3090 (1) 2990 (1) 2150 (1.2)    Emesis/NG output       Stool 1050      Total Output 4140 2990 2150    Net -0461 -925 -6034               Diet Regular; Consistent Carbohydrate  ----------------------------------------------------------------------------------------------------------------------  Physical exam:  Constitutional:  Well-developed and well-nourished.  No respiratory distress. Obese.   HENT:  Head:  Normocephalic and atraumatic.  Mouth:   Moist mucous membranes.    Eyes:  Conjunctivae and EOM are normal. No scleral icterus.    Neck:  Neck supple.  No JVD present.    Cardiovascular:  Normal rate, regular rhythm and normal heart sounds with no murmur.  Pulmonary/Chest:  No respiratory distress, no wheezes, no crackles, with normal breath sounds and good air movement.  Abdominal:  Soft.  Bowel sounds are normal.  No distension and no tenderness.     Neurological:  Alert and oriented to person, place, and time.  No cranial nerve deficit.  No tongue deviation.  No facial droop.  No slurred speech.   Skin:  Skin is warm and dry. No rash noted. No pallor.   Peripheral vascular:  Pulses in all 4 extremities with no clubbing, no cyanosis, no edema.  ----------------------------------------------------------------------------------------------------------------------  Tele:    ----------------------------------------------------------------------------------------------------------------------  Results from last 7 days   Lab Units 02/14/21  0221 02/13/21  0210 02/12/21  0053  02/08/21  0903   CRP mg/dL 3.43* 2.89* 3.18*   < >  --    LACTATE mmol/L  --   --   --   --  1.7   WBC 10*3/mm3 10.30 10.36 9.11   < > 9.64   HEMOGLOBIN g/dL 10.5* 10.5* 10.5*   < > 10.5*   HEMATOCRIT % 37.2* 37.3* 37.5   < > 38.1   MCV fL 80.0 80.4 80.0   < > 81.2   MCHC g/dL 28.2* 28.2* 28.0*   < > 27.6*   PLATELETS 10*3/mm3 364 400 396   < > 459*    < > = values in this interval not displayed.     Results from last 7 days   Lab Units 02/13/21  0905   PH, ARTERIAL pH units 7.423   PO2 ART mm Hg 86.6   PCO2, ARTERIAL mm Hg 44.7   HCO3 ART mmol/L 29.2*     Results from last 7 days   Lab Units 02/14/21  0221 02/13/21  0210 02/12/21  0053  02/09/21  0352 02/08/21  0902   SODIUM mmol/L 136 138 138   < > 139 139   POTASSIUM mmol/L 4.7 4.0 4.2   < > 3.2* 4.2   MAGNESIUM mg/dL  --   --   --   --   --  2.1   CHLORIDE mmol/L 99 102 103   < > 107 109*   CO2 mmol/L 28.1 25.7 24.9   < > 21.2* 19.3*    BUN mg/dL 21* 15 16   < > 22* 48*   CREATININE mg/dL 0.72* 0.67* 0.74*   < > 0.66* 1.13   EGFR IF NONAFRICN AM mL/min/1.73 119 129 115   < > 132 71   CALCIUM mg/dL 9.4 9.5 9.4   < > 8.0* 7.8*   PHOSPHORUS mg/dL  --   --   --   --   --  3.0   GLUCOSE mg/dL 324* 281* 313*   < > 163* 181*   ALBUMIN g/dL 3.24*  --   --   --  2.88* 3.06*   BILIRUBIN mg/dL 0.2  --   --   --  0.3 0.4   ALK PHOS U/L 84  --   --   --  79 86   AST (SGOT) U/L 23  --   --   --  25 27   ALT (SGPT) U/L 29  --   --   --  22 27    < > = values in this interval not displayed.   Estimated Creatinine Clearance: 180.4 mL/min (A) (by C-G formula based on SCr of 0.72 mg/dL (L)).  No results found for: AMMONIA              Glucose   Date/Time Value Ref Range Status   02/14/2021 2047 350 (H) 70 - 130 mg/dL Final   02/14/2021 1649 275 (H) 70 - 130 mg/dL Final   02/14/2021 1200 290 (H) 70 - 130 mg/dL Final   02/14/2021 0611 266 (H) 70 - 130 mg/dL Final   02/13/2021 2107 290 (H) 70 - 130 mg/dL Final   02/13/2021 1715 270 (H) 70 - 130 mg/dL Final   02/13/2021 1212 327 (H) 70 - 130 mg/dL Final   02/13/2021 0616 318 (H) 70 - 130 mg/dL Final     Lab Results   Component Value Date    TSH 1.160 02/07/2021     No results found for: PREGTESTUR, PREGSERUM, HCG, HCGQUANT  Pain Management Panel     Pain Management Panel Latest Ref Rng & Units 8/19/2018 12/5/2017    AMPHETAMINES SCREEN, URINE Negative Negative Negative    BARBITURATES SCREEN Negative Negative Negative    BENZODIAZEPINE SCREEN, URINE Negative Negative Negative    BUPRENORPHINEUR Negative Negative Negative    COCAINE SCREEN, URINE Negative Negative Negative    METHADONE SCREEN, URINE Negative Negative Negative        Brief Urine Lab Results  (Last result in the past 365 days)      Color   Clarity   Blood   Leuk Est   Nitrite   Protein   CREAT   Urine HCG        02/07/21 1826 Dark Yellow Cloudy Negative Large (3+) Negative 100 mg/dL (2+)             No results found for: BLOODCX  No results found for:  URINECX  Wound Culture   Date Value Ref Range Status   02/11/2021 Scant growth (1+) Acinetobacter baumannii MDRO (C)  Preliminary     Comment:     Multi drug resistant Acinetobacter, patient may be an isolation risk.  Sent to [facility] for Colistin and Polymyxin B    Multi drug resistant Acinetobacter, patient may be an isolation risk.   02/11/2021 Rare Staphylococcus aureus (A)  Preliminary     No results found for: STOOLCX  No results found for: RESPCX  No results found for: AFBCX  Results from last 7 days   Lab Units 02/14/21  0221 02/13/21  0210 02/12/21  0053 02/11/21  0051 02/10/21  0033 02/08/21  0903 02/08/21  0902   LACTATE mmol/L  --   --   --   --   --  1.7  --    CRP mg/dL 3.43* 2.89* 3.18* 3.50* 5.83*  --  8.79*       I have personally looked at the labs and they are summarized above.  ----------------------------------------------------------------------------------------------------------------------  Detailed radiology reports for the last 24 hours:    Imaging Results (Last 24 Hours)     ** No results found for the last 24 hours. **        Assessment & Plan    #Septic shock 2/2 UTI  - Urine culture growing Klebsiella pneumonia. Sensitivities reviewed. ID consulted.   - Abx transitioned to rocephin earlier in stay. Has completed treatment for UTI.     #Unstagable decubitus ulcers  #Hx of osteomyelitis associated with decubitus ulcers  #Paraplegia 2/2 MA  - Compared to prior pictures, ulcers appear deeper and redness more pronounced. Some drainage noted on exam. Patient also noted new purulent smelling drainage prior to presentation.  - Given improvement on rocphin, suspect sepsis was 2/2 UTI but cannot rule out recurrent acute osteomyelitis at presentation. MRI obtained and consistent with osteo and again noted fracture.   - ID plans to treat for osteo again. Wound growing MDRO acinetobacter baumanni. Abx transferred to tygacil.   - PICC line ordered. Midline placed 2/12.   - Wound culture growing scant  growth gram negative bacilli.   - Wound care APRN consulted. Ostomy care. Supportive care.     #Ileus vs. Possible small bowel obstruction   - CT the abdomen and pelvis without contrast showed multiple dilated loops of bowel, no significant free fluid, possibly representing potential small bowel stricture versus ileus  - Patient now passing liquid stool in osotomy   - Surgery consulted. Appreciate recs. Diet advanced.     #Chronic appearing right femur fracture  - Orthopedic surgery consulted as per ID recs.     #JAKE  - Prerenal vs. ATN from sepsis. CT did not reveal evidence of obstructive uropathy.   - Cr has normalized.     #Hypokalemia  - Continue to replace as per protocol     #DM II  - SSI    #ARLIN on home CPAP   - Patient continues to have desaturation events on home CPAP while sleeping. No hypoxia while awake.   - Will start CPAP with our machine so we can titrate settings if needed. Will start with home pressure of 15. Can also transition to BiPAP if needed.  Patient may need supplemental O2 with home CPAP as he currently uses it with room air.   - Will consider HFNC as an alternative option while inpatient as it can deliver PEEP also.   - ABG and chest x-ray wnl.       F: PO  E: Replace as needed   N: Regular     Code status: Full     Dispo: Pending clinical improvement     VTE Prophylaxis:   Mechanical Order History:     None      Pharmalogical Order History:      Ordered     Dose Route Frequency Stop    02/09/21 0747  apixaban (ELIQUIS) tablet 5 mg      5 mg PO Every 12 Hours Scheduled --                Brannon Adrian MD  [facility]  02/14/21  21:26 EST

## 2021-02-15 NOTE — PLAN OF CARE
Problem: Adult Inpatient Plan of Care  Goal: Plan of Care Review  Outcome: Ongoing, Progressing  Flowsheets (Taken 2/15/2021 1806)  Outcome Summary: Patient resting in bed. VSS. Wound vac replaced. To be replaced by wound care tomorrow. No needs at this time. Will continue to monitor.  Goal: Patient-Specific Goal (Individualized)  Outcome: Ongoing, Progressing  Goal: Absence of Hospital-Acquired Illness or Injury  Outcome: Ongoing, Progressing  Intervention: Identify and Manage Fall Risk  Recent Flowsheet Documentation  Taken 2/15/2021 1700 by Evelyn Lugo RN  Safety Promotion/Fall Prevention: safety round/check completed  Taken 2/15/2021 1500 by Evelyn Lugo RN  Safety Promotion/Fall Prevention: safety round/check completed  Taken 2/15/2021 1300 by Evelyn Lugo RN  Safety Promotion/Fall Prevention: safety round/check completed  Taken 2/15/2021 1100 by Evelyn Lugo RN  Safety Promotion/Fall Prevention: safety round/check completed  Taken 2/15/2021 0900 by Evelyn Lugo RN  Safety Promotion/Fall Prevention: safety round/check completed  Taken 2/15/2021 0700 by Evelyn Lugo RN  Safety Promotion/Fall Prevention: safety round/check completed  Intervention: Prevent and Manage VTE (venous thromboembolism) Risk  Recent Flowsheet Documentation  Taken 2/15/2021 0830 by Evelyn Lugo RN  VTE Prevention/Management: (See MAR) other (see comments)  Intervention: Prevent Infection  Recent Flowsheet Documentation  Taken 2/15/2021 1700 by Evelyn Lugo RN  Infection Prevention: rest/sleep promoted  Taken 2/15/2021 1500 by Evelyn Lugo RN  Infection Prevention: rest/sleep promoted  Taken 2/15/2021 1300 by Evelyn Lugo RN  Infection Prevention: rest/sleep promoted  Taken 2/15/2021 1100 by Evelyn Lugo RN  Infection Prevention: rest/sleep promoted  Taken 2/15/2021 0900 by Evelyn Lugo RN  Infection Prevention: rest/sleep promoted  Taken 2/15/2021 0700 by Evelyn Lugo RN  Infection Prevention: rest/sleep  promoted  Goal: Optimal Comfort and Wellbeing  Outcome: Ongoing, Progressing  Intervention: Provide Person-Centered Care  Recent Flowsheet Documentation  Taken 2/15/2021 0830 by Evelyn Lugo RN  Trust Relationship/Rapport:   care explained   choices provided  Goal: Readiness for Transition of Care  Outcome: Ongoing, Progressing     Problem: Skin Injury Risk Increased  Goal: Skin Health and Integrity  Outcome: Ongoing, Progressing  Intervention: Optimize Skin Protection  Recent Flowsheet Documentation  Taken 2/15/2021 0830 by Evelyn Lugo RN  Pressure Reduction Techniques: frequent weight shift encouraged  Pressure Reduction Devices: pressure-redistributing mattress utilized  Skin Protection: adhesive use limited  Intervention: Promote and Optimize Oral Intake  Recent Flowsheet Documentation  Taken 2/15/2021 0830 by Evelyn Lugo RN  Oral Nutrition Promotion: rest periods promoted     Problem: Fall Injury Risk  Goal: Absence of Fall and Fall-Related Injury  Outcome: Ongoing, Progressing  Intervention: Identify and Manage Contributors to Fall Injury Risk  Recent Flowsheet Documentation  Taken 2/15/2021 1700 by Evelyn Lugo RN  Medication Review/Management: medications reviewed  Taken 2/15/2021 1500 by Evelyn Lugo RN  Medication Review/Management: medications reviewed  Taken 2/15/2021 1300 by Evelyn Lugo RN  Medication Review/Management: medications reviewed  Taken 2/15/2021 1100 by Evelyn Lugo RN  Medication Review/Management: medications reviewed  Taken 2/15/2021 0900 by Evelyn Lugo RN  Medication Review/Management: medications reviewed  Taken 2/15/2021 0830 by Evelyn Lugo RN  Self-Care Promotion:   independence encouraged   BADL personal objects within reach   BADL personal routines maintained   safe use of adaptive equipment encouraged  Taken 2/15/2021 0700 by Evelyn Lugo RN  Medication Review/Management: medications reviewed  Intervention: Promote Injury-Free Environment  Recent Flowsheet  Documentation  Taken 2/15/2021 1700 by Evelyn Lugo RN  Safety Promotion/Fall Prevention: safety round/check completed  Taken 2/15/2021 1500 by Evelyn Lugo RN  Safety Promotion/Fall Prevention: safety round/check completed  Taken 2/15/2021 1300 by Evelyn Lugo RN  Safety Promotion/Fall Prevention: safety round/check completed  Taken 2/15/2021 1100 by Evelyn Lugo RN  Safety Promotion/Fall Prevention: safety round/check completed  Taken 2/15/2021 0900 by Evelyn Lugo RN  Safety Promotion/Fall Prevention: safety round/check completed  Taken 2/15/2021 0700 by Evelyn Lugo RN  Safety Promotion/Fall Prevention: safety round/check completed     Problem: Asthma Comorbidity  Goal: Maintenance of Asthma Control  Outcome: Ongoing, Progressing  Intervention: Maintain Asthma Symptom Control  Recent Flowsheet Documentation  Taken 2/15/2021 1700 by Evelyn Lugo RN  Medication Review/Management: medications reviewed  Taken 2/15/2021 1500 by Evelyn Lugo RN  Medication Review/Management: medications reviewed  Taken 2/15/2021 1300 by Evelyn Lugo RN  Medication Review/Management: medications reviewed  Taken 2/15/2021 1100 by Evelyn Lugo RN  Medication Review/Management: medications reviewed  Taken 2/15/2021 0900 by Evelyn Lugo RN  Medication Review/Management: medications reviewed  Taken 2/15/2021 0700 by Evelyn Lugo RN  Medication Review/Management: medications reviewed     Problem: COPD Comorbidity  Goal: Maintenance of COPD Symptom Control  Outcome: Ongoing, Progressing  Intervention: Maintain COPD-Symptom Control  Recent Flowsheet Documentation  Taken 2/15/2021 1700 by Evelyn Lugo RN  Medication Review/Management: medications reviewed  Taken 2/15/2021 1500 by Evelyn Lugo RN  Medication Review/Management: medications reviewed  Taken 2/15/2021 1300 by Evelyn Lugo RN  Medication Review/Management: medications reviewed  Taken 2/15/2021 1100 by Evelyn Lugo RN  Medication Review/Management:  medications reviewed  Taken 2/15/2021 0900 by Evelyn Lugo RN  Medication Review/Management: medications reviewed  Taken 2/15/2021 0700 by Evelyn Lugo RN  Medication Review/Management: medications reviewed     Problem: Diabetes Comorbidity  Goal: Blood Glucose Level Within Desired Range  Outcome: Ongoing, Progressing  Intervention: Maintain Glycemic Control  Recent Flowsheet Documentation  Taken 2/15/2021 0830 by Evelyn Lugo RN  Glycemic Management: blood glucose monitoring     Problem: Heart Failure Comorbidity  Goal: Maintenance of Heart Failure Symptom Control  Outcome: Ongoing, Progressing  Intervention: Maintain Heart Failure-Management Strategies  Recent Flowsheet Documentation  Taken 2/15/2021 1700 by Evelyn Lugo RN  Medication Review/Management: medications reviewed  Taken 2/15/2021 1500 by Evelyn Lugo RN  Medication Review/Management: medications reviewed  Taken 2/15/2021 1300 by Evelyn Lugo RN  Medication Review/Management: medications reviewed  Taken 2/15/2021 1100 by Evelyn Lugo RN  Medication Review/Management: medications reviewed  Taken 2/15/2021 0900 by Evelyn Lugo RN  Medication Review/Management: medications reviewed  Taken 2/15/2021 0700 by Evelyn Lugo RN  Medication Review/Management: medications reviewed     Problem: Hypertension Comorbidity  Goal: Blood Pressure in Desired Range  Outcome: Ongoing, Progressing  Intervention: Maintain Hypertension-Management Strategies  Recent Flowsheet Documentation  Taken 2/15/2021 1700 by Evelyn Lugo RN  Medication Review/Management: medications reviewed  Taken 2/15/2021 1500 by Evelyn Lugo RN  Medication Review/Management: medications reviewed  Taken 2/15/2021 1300 by Evelyn Lugo RN  Medication Review/Management: medications reviewed  Taken 2/15/2021 1100 by Evelyn Lugo RN  Medication Review/Management: medications reviewed  Taken 2/15/2021 0900 by Evelyn Lugo RN  Medication Review/Management: medications  reviewed  Taken 2/15/2021 0700 by Evelyn Lugo RN  Medication Review/Management: medications reviewed     Problem: Obstructive Sleep Apnea Risk or Actual (Comorbidity Management)  Goal: Unobstructed Breathing During Sleep  Outcome: Ongoing, Progressing     Problem: Pain Chronic (Persistent) (Comorbidity Management)  Goal: Acceptable Pain Control and Functional Ability  Outcome: Ongoing, Progressing  Intervention: Develop Pain Management Plan  Recent Flowsheet Documentation  Taken 2/15/2021 0830 by Evelyn Lugo RN  Pain Management Interventions: care clustered  Intervention: Manage Persistent Pain  Recent Flowsheet Documentation  Taken 2/15/2021 1700 by Evelyn Lugo RN  Medication Review/Management: medications reviewed  Taken 2/15/2021 1500 by Evelyn Lugo RN  Medication Review/Management: medications reviewed  Taken 2/15/2021 1300 by Evelyn Lugo RN  Medication Review/Management: medications reviewed  Taken 2/15/2021 1100 by Evelyn Lugo RN  Medication Review/Management: medications reviewed  Taken 2/15/2021 0900 by Evelyn Lugo RN  Medication Review/Management: medications reviewed  Taken 2/15/2021 0830 by Evelyn Lugo RN  Bowel Elimination Promotion: adequate fluid intake promoted  Sleep/Rest Enhancement: regular sleep/rest pattern promoted  Taken 2/15/2021 0700 by Evelyn Lugo RN  Medication Review/Management: medications reviewed  Intervention: Optimize Psychosocial Wellbeing  Recent Flowsheet Documentation  Taken 2/15/2021 0830 by Evelyn Lugo RN  Supportive Measures:   active listening utilized   self-care encouraged  Diversional Activities: television  Family/Support System Care:   self-care encouraged   support provided     Problem: Seizure Disorder Comorbidity  Goal: Maintenance of Seizure Control  Outcome: Ongoing, Progressing  Intervention: Maintain Seizure-Symptom Control  Recent Flowsheet Documentation  Taken 2/15/2021 0830 by Evelyn Lugo RN  Seizure Precautions:   clutter-free  environment maintained   activity supervised     Problem: Wound  Goal: Optimal Wound Healing  Outcome: Ongoing, Progressing  Intervention: Promote Effective Wound Healing  Recent Flowsheet Documentation  Taken 2/15/2021 0830 by Evelyn Lugo RN  Oral Nutrition Promotion: rest periods promoted  Pain Management Interventions: care clustered  Sleep/Rest Enhancement: regular sleep/rest pattern promoted  Goal: Optimal Wound Healing  Outcome: Ongoing, Progressing  Intervention: Promote Effective Wound Healing  Recent Flowsheet Documentation  Taken 2/15/2021 0830 by Evelyn Lugo, RN  Oral Nutrition Promotion: rest periods promoted  Pain Management Interventions: care clustered  Sleep/Rest Enhancement: regular sleep/rest pattern promoted  Goal: Optimal Wound Healing  Outcome: Ongoing, Progressing  Intervention: Promote Effective Wound Healing  Recent Flowsheet Documentation  Taken 2/15/2021 0830 by Evelyn Lugo RN  Oral Nutrition Promotion: rest periods promoted  Pain Management Interventions: care clustered  Sleep/Rest Enhancement: regular sleep/rest pattern promoted   Goal Outcome Evaluation:        Outcome Summary: Patient resting in bed. VSS. Wound vac replaced. To be replaced by wound care tomorrow. No needs at this time. Will continue to monitor.

## 2021-02-15 NOTE — PROGRESS NOTES
Discharge Planning Assessment  MAKI Regalado     Patient Name: Ken Krueger  MRN: [id number]  Today's Date: 2/15/2021    Admit Date: 2/7/2021        Discharge Plan     Row Name 02/15/21 1610       Plan    Plan  Pt was admitted on 2/7/21. Pt reports that he plans to return home at discharge with family providing transport. Infectious Disease noted that Pt will need Tygacil 50 mg IV q 12 hours through 3/14/2021.  SS faxed information needed to To with [facility] [phone] for set up of IV antibiotics. SS will continues to follow and assist with discharge planning.    Patient/Family in Agreement with Plan  yes       Jessica Kilgore

## 2021-02-15 NOTE — PLAN OF CARE
Goal Outcome Evaluation:     Progress: improving  Outcome Summary: VSS. Pt wound care completed. Pt has all items in reach, will continue to monitor

## 2021-02-15 NOTE — PROGRESS NOTES
Subjective     History:   Ken Krueger is a 45 y.o. male admitted on 2/7/2021 secondary to Severe sepsis (CMS/[facility])     Procedures:   2/12/21: Left midline catheter placement placement     CC: Follow up severe sepsis     Patient seen and examined with SHAHNAZ Rivas. Awake and alert. Denies any current complaints. States he feels much better compared to upon admission. Denies CP, dyspnea or cough. Denies nausea or vomiting. Tolerating PO intake. States he did not wear the CPAP last PM as he has not been to sleep yet. No acute events overnight per RN.     History taken from: patient, chart, and RN.      Objective     Vital Signs  Temp:  [97.1 °F (36.2 °C)-98 °F (36.7 °C)] 97.6 °F (36.4 °C)  Heart Rate:  [71-99] 72  Resp:  [11-27] 16  BP: (116-160)/(69-96) 136/74    Intake/Output Summary (Last 24 hours) at 2/15/2021 1352  Last data filed at 2/15/2021 1300  Gross per 24 hour   Intake 1588 ml   Output 3925 ml   Net -2337 ml         Physical Exam:  General:    Awake, alert, in no acute distress   Heart:      Normal S1 and S2. Regular rate and rhythm. No significant murmur, rubs or gallops appreciated.   Lungs:     Respirations regular, even and unlabored. Lungs clear to auscultation B/L. No wheezes, rales or rhonchi.   Abdomen:   Soft and nontender. No guarding, rebound tenderness or  organomegaly noted. Bowel sounds present x 4. (+) urostomy and colostomy.    Extremities:  (+) AKA. No clubbing, cyanosis or edema noted in RLE.      Results Review:    Results from last 7 days   Lab Units 02/15/21  0429 02/14/21  0221 02/13/21  0210 02/12/21  0053 02/11/21  0051 02/10/21  0033 02/09/21  0352   WBC 10*3/mm3 10.15 10.30 10.36 9.11 9.01 8.06 7.19   HEMOGLOBIN g/dL 10.0* 10.5* 10.5* 10.5* 10.6* 10.3* 9.9*   PLATELETS 10*3/mm3 371 364 400 396 393 404 409     Results from last 7 days   Lab Units 02/15/21  0429 02/14/21  0221 02/13/21  0210 02/12/21  0053 02/11/21  0051 02/10/21  1720 02/10/21  0033  02/09/21  0352   SODIUM mmol/L 135* 136  138 138 128*  --  137  --  139   POTASSIUM mmol/L 4.0 4.7 4.0 4.2 3.8 4.0 3.5   < > 3.2*   CHLORIDE mmol/L 101 99 102 103 95*  --  104  --  107   CO2 mmol/L 23.8 28.1 25.7 24.9 23.0  --  22.3  --  21.2*   BUN mg/dL 21* 21* 15 16 13  --  15  --  22*   CREATININE mg/dL 0.68* 0.72* 0.67* 0.74* 0.62*  --  0.82  --  0.66*   CALCIUM mg/dL 8.9 9.4 9.5 9.4 9.9  --  9.0  --  8.0*   GLUCOSE mg/dL 336* 324* 281* 313* 235*  --  211*  --  163*    < > = values in this interval not displayed.     Results from last 7 days   Lab Units 02/15/21  0429 02/14/21  0221 02/09/21  0352   BILIRUBIN mg/dL <0.2 0.2 0.3   ALK PHOS U/L 83 84 79   AST (SGOT) U/L 18 23 25   ALT (SGPT) U/L 25 29 22                   Imaging Results (Last 24 Hours)     ** No results found for the last 24 hours. **            Medications:  apixaban, 5 mg, Oral, Q12H  ARIPiprazole, 10 mg, Oral, Nightly  atorvastatin, 10 mg, Oral, Nightly  baclofen, 30 mg, Oral, 4x Daily With Meals & Nightly  cholecalciferol, 2,000 Units, Oral, Daily  DULoxetine, 60 mg, Oral, BID  gabapentin, 400 mg, Oral, Q8H  insulin aspart, 0-14 Units, Subcutaneous, TID AC  insulin aspart, 5 Units, Subcutaneous, TID With Meals  insulin detemir, 10 Units, Subcutaneous, Nightly  lactobacillus acidophilus, 1 capsule, Oral, Daily  magic barrier cream, , Topical, 4x Daily  pantoprazole, 40 mg, Oral, BID AC  sodium chloride, 10 mL, Intravenous, Q12H  sodium hypochlorite, , Topical, BID  tigecycline, 50 mg, Intravenous, Q12H               Assessment/Plan   Severe sepsis: Likely 2/2 complicated UTI and osteomyelitis associated with unstageable decubitus ulcers. Afebrile and hemodynamically stable. Leukocytosis has resolved with stable WBC today. CRP improved and stable today. Lactate normalized soon upon admission. Urine culture revealed Klebsiella pneumoniae and course of Rocephin completed. Wound culture revealing scant growth of Acinetobacter and currently on Tygacil per ID recs. Repeat labs in the AM.  ID input appreciated.     Klebsiella UTI: Course of Rocephin completed.     Osteomyelitis associated with decubitus ulcers: MRI reveals old fracture of the proximal right femur with surrounding fluid and minimal increased signal in the proximal right femur concerning for osteomyelitis. Cont wound vac. Cont Tygacil per ID recs. Midline placed and I have ordered PICC consult as pt will likely require prolonged course of IV antibiotics. ID input appreciated.     Nausea and vomiting with concern for ileus vs SBO: CT abd/pelvis revealed multiple dilated loops of bowel and significant free fluid. Surgery consulted who felt his presentation may be more consistent with gastroenteritis. Diet has been advanced and he is tolerating PO intake well. Surgery input appreciated.     JAKE: Possibly 2/2 above. Improved with IV fluids and treatment of sepsis. Creatine stable today. Cont to monitor.     Chronic right femur fracture: Ortho consulted with recs to continue current management.    DM II, insulin dependent with hyperglycemia: HgbA1c 8.9. Cont basal and sliding scale. Add meal time insulin. Cont to monitor.     Acute hypoxic respiratory failure: Episodes of desaturation noted on home CPAP. Noncompliance with CPAP noted as well. Pt encouraged to use hospital CPAP with home settings to see if he needs adjustments or supplemental O2. Cont to encourage use of CPAP as pt did not wear CPAP last PM.     Hypokalemia: Improved with supplementation. Mg initially <2 but improved with supplementation.     Hx of PE and DVT: Cont Eliquis.     DVT PPX: Eliquis    Transfer to med/surg.       Javier Moscoso,   02/15/21  13:52 EST

## 2021-02-16 LAB
ALBUMIN SERPL-MCNC: 3.15 G/DL (ref 3.5–5.2)
ALBUMIN/GLOB SERPL: 0.8 G/DL
ALP SERPL-CCNC: 96 U/L (ref 39–117)
ALT SERPL W P-5'-P-CCNC: 25 U/L (ref 1–41)
ANION GAP SERPL CALCULATED.3IONS-SCNC: 10 MMOL/L (ref 5–15)
ANISOCYTOSIS BLD QL: NORMAL
AST SERPL-CCNC: 24 U/L (ref 1–40)
BASOPHILS # BLD AUTO: 0.03 10*3/MM3 (ref 0–0.2)
BASOPHILS NFR BLD AUTO: 0.3 % (ref 0–1.5)
BILIRUB SERPL-MCNC: 0.2 MG/DL (ref 0–1.2)
BUN SERPL-MCNC: 20 MG/DL (ref 6–20)
BUN/CREAT SERPL: 24.1 (ref 7–25)
CALCIUM SPEC-SCNC: 9.1 MG/DL (ref 8.6–10.5)
CHLORIDE SERPL-SCNC: 98 MMOL/L (ref 98–107)
CO2 SERPL-SCNC: 25 MMOL/L (ref 22–29)
CREAT SERPL-MCNC: 0.83 MG/DL (ref 0.76–1.27)
CRP SERPL-MCNC: 2.79 MG/DL (ref 0–0.5)
DEPRECATED RDW RBC AUTO: 45.8 FL (ref 37–54)
EOSINOPHIL # BLD AUTO: 0.18 10*3/MM3 (ref 0–0.4)
EOSINOPHIL NFR BLD AUTO: 1.6 % (ref 0.3–6.2)
ERYTHROCYTE [DISTWIDTH] IN BLOOD BY AUTOMATED COUNT: 15.9 % (ref 12.3–15.4)
GFR SERPL CREATININE-BSD FRML MDRD: 101 ML/MIN/1.73
GLOBULIN UR ELPH-MCNC: 3.8 GM/DL
GLUCOSE BLDC GLUCOMTR-MCNC: 172 MG/DL (ref 70–130)
GLUCOSE BLDC GLUCOMTR-MCNC: 229 MG/DL (ref 70–130)
GLUCOSE BLDC GLUCOMTR-MCNC: 329 MG/DL (ref 70–130)
GLUCOSE BLDC GLUCOMTR-MCNC: 347 MG/DL (ref 70–130)
GLUCOSE SERPL-MCNC: 375 MG/DL (ref 65–99)
HCT VFR BLD AUTO: 36.6 % (ref 37.5–51)
HGB BLD-MCNC: 10.1 G/DL (ref 13–17.7)
HYPOCHROMIA BLD QL: NORMAL
IMM GRANULOCYTES # BLD AUTO: 0.05 10*3/MM3 (ref 0–0.05)
IMM GRANULOCYTES NFR BLD AUTO: 0.4 % (ref 0–0.5)
LYMPHOCYTES # BLD AUTO: 2.76 10*3/MM3 (ref 0.7–3.1)
LYMPHOCYTES NFR BLD AUTO: 24.4 % (ref 19.6–45.3)
MCH RBC QN AUTO: 22.1 PG (ref 26.6–33)
MCHC RBC AUTO-ENTMCNC: 27.6 G/DL (ref 31.5–35.7)
MCV RBC AUTO: 80.1 FL (ref 79–97)
MICROCYTES BLD QL: NORMAL
MONOCYTES # BLD AUTO: 0.57 10*3/MM3 (ref 0.1–0.9)
MONOCYTES NFR BLD AUTO: 5 % (ref 5–12)
NEUTROPHILS NFR BLD AUTO: 68.3 % (ref 42.7–76)
NEUTROPHILS NFR BLD AUTO: 7.7 10*3/MM3 (ref 1.7–7)
NRBC BLD AUTO-RTO: 0 /100 WBC (ref 0–0.2)
PLAT MORPH BLD: NORMAL
PLATELET # BLD AUTO: 347 10*3/MM3 (ref 140–450)
PMV BLD AUTO: 10.8 FL (ref 6–12)
POTASSIUM SERPL-SCNC: 4.2 MMOL/L (ref 3.5–5.2)
PROT SERPL-MCNC: 6.9 G/DL (ref 6–8.5)
RBC # BLD AUTO: 4.57 10*6/MM3 (ref 4.14–5.8)
SODIUM SERPL-SCNC: 133 MMOL/L (ref 136–145)
WBC # BLD AUTO: 11.29 10*3/MM3 (ref 3.4–10.8)

## 2021-02-16 PROCEDURE — 25010000002 TIGECYCLINE PER 1 MG: Performed by: INTERNAL MEDICINE

## 2021-02-16 PROCEDURE — 63710000001 INSULIN ASPART PER 5 UNITS: Performed by: INTERNAL MEDICINE

## 2021-02-16 PROCEDURE — 25010000002 TIGECYCLINE: Performed by: INTERNAL MEDICINE

## 2021-02-16 PROCEDURE — 85025 COMPLETE CBC W/AUTO DIFF WBC: CPT | Performed by: INTERNAL MEDICINE

## 2021-02-16 PROCEDURE — 63710000001 INSULIN DETEMIR PER 5 UNITS: Performed by: INTERNAL MEDICINE

## 2021-02-16 PROCEDURE — 94799 UNLISTED PULMONARY SVC/PX: CPT

## 2021-02-16 PROCEDURE — 80053 COMPREHEN METABOLIC PANEL: CPT | Performed by: INTERNAL MEDICINE

## 2021-02-16 PROCEDURE — 82962 GLUCOSE BLOOD TEST: CPT

## 2021-02-16 PROCEDURE — 85007 BL SMEAR W/DIFF WBC COUNT: CPT | Performed by: INTERNAL MEDICINE

## 2021-02-16 PROCEDURE — 99232 SBSQ HOSP IP/OBS MODERATE 35: CPT | Performed by: INTERNAL MEDICINE

## 2021-02-16 PROCEDURE — 86140 C-REACTIVE PROTEIN: CPT | Performed by: INTERNAL MEDICINE

## 2021-02-16 RX ADMIN — ARIPIPRAZOLE 10 MG: 10 TABLET ORAL at 22:00

## 2021-02-16 RX ADMIN — APIXABAN 5 MG: 5 TABLET, FILM COATED ORAL at 22:00

## 2021-02-16 RX ADMIN — SODIUM HYPOCHLORITE: 1.25 SOLUTION TOPICAL at 02:31

## 2021-02-16 RX ADMIN — PANTOPRAZOLE SODIUM 40 MG: 40 TABLET, DELAYED RELEASE ORAL at 16:56

## 2021-02-16 RX ADMIN — PANTOPRAZOLE SODIUM 40 MG: 40 TABLET, DELAYED RELEASE ORAL at 05:36

## 2021-02-16 RX ADMIN — Medication: at 02:31

## 2021-02-16 RX ADMIN — SODIUM CHLORIDE, PRESERVATIVE FREE 10 ML: 5 INJECTION INTRAVENOUS at 22:01

## 2021-02-16 RX ADMIN — Medication: at 16:56

## 2021-02-16 RX ADMIN — BACLOFEN 30 MG: 10 TABLET ORAL at 09:12

## 2021-02-16 RX ADMIN — GABAPENTIN 400 MG: 400 CAPSULE ORAL at 15:03

## 2021-02-16 RX ADMIN — SODIUM CHLORIDE 50 MG: 900 INJECTION INTRAVENOUS at 02:25

## 2021-02-16 RX ADMIN — INSULIN ASPART 8 UNITS: 100 INJECTION, SOLUTION INTRAVENOUS; SUBCUTANEOUS at 12:00

## 2021-02-16 RX ADMIN — GABAPENTIN 400 MG: 400 CAPSULE ORAL at 05:36

## 2021-02-16 RX ADMIN — Medication: at 09:11

## 2021-02-16 RX ADMIN — INSULIN ASPART 10 UNITS: 100 INJECTION, SOLUTION INTRAVENOUS; SUBCUTANEOUS at 09:12

## 2021-02-16 RX ADMIN — DULOXETINE HYDROCHLORIDE 60 MG: 60 CAPSULE, DELAYED RELEASE ORAL at 22:00

## 2021-02-16 RX ADMIN — INSULIN DETEMIR 10 UNITS: 100 INJECTION, SOLUTION SUBCUTANEOUS at 22:02

## 2021-02-16 RX ADMIN — SODIUM CHLORIDE 50 MG: 900 INJECTION INTRAVENOUS at 15:03

## 2021-02-16 RX ADMIN — BACLOFEN 30 MG: 10 TABLET ORAL at 16:56

## 2021-02-16 RX ADMIN — INSULIN ASPART 8 UNITS: 100 INJECTION, SOLUTION INTRAVENOUS; SUBCUTANEOUS at 16:56

## 2021-02-16 RX ADMIN — DULOXETINE HYDROCHLORIDE 60 MG: 60 CAPSULE, DELAYED RELEASE ORAL at 09:12

## 2021-02-16 RX ADMIN — GABAPENTIN 400 MG: 400 CAPSULE ORAL at 22:01

## 2021-02-16 RX ADMIN — INSULIN ASPART 10 UNITS: 100 INJECTION, SOLUTION INTRAVENOUS; SUBCUTANEOUS at 11:59

## 2021-02-16 RX ADMIN — CHOLECALCIFEROL TAB 10 MCG (400 UNIT) 2000 UNITS: 10 TAB at 09:11

## 2021-02-16 RX ADMIN — APIXABAN 5 MG: 5 TABLET, FILM COATED ORAL at 09:12

## 2021-02-16 RX ADMIN — ATORVASTATIN CALCIUM 10 MG: 10 TABLET, FILM COATED ORAL at 22:00

## 2021-02-16 RX ADMIN — BACLOFEN 30 MG: 10 TABLET ORAL at 22:00

## 2021-02-16 RX ADMIN — Medication: at 12:00

## 2021-02-16 RX ADMIN — INSULIN ASPART 5 UNITS: 100 INJECTION, SOLUTION INTRAVENOUS; SUBCUTANEOUS at 16:56

## 2021-02-16 RX ADMIN — SODIUM CHLORIDE, PRESERVATIVE FREE 10 ML: 5 INJECTION INTRAVENOUS at 09:12

## 2021-02-16 RX ADMIN — Medication 1 CAPSULE: at 09:12

## 2021-02-16 RX ADMIN — INSULIN ASPART 5 UNITS: 100 INJECTION, SOLUTION INTRAVENOUS; SUBCUTANEOUS at 09:13

## 2021-02-16 RX ADMIN — SODIUM HYPOCHLORITE: 1.25 SOLUTION TOPICAL at 22:01

## 2021-02-16 RX ADMIN — BACLOFEN 30 MG: 10 TABLET ORAL at 11:59

## 2021-02-16 RX ADMIN — INSULIN DETEMIR 10 UNITS: 100 INJECTION, SOLUTION SUBCUTANEOUS at 02:24

## 2021-02-16 RX ADMIN — SODIUM HYPOCHLORITE: 1.25 SOLUTION TOPICAL at 10:45

## 2021-02-16 RX ADMIN — Medication: at 22:01

## 2021-02-16 NOTE — PROGRESS NOTES
PROGRESS NOTE         Patient Identification:  Name:  Ken Krueger  Age:  45 y.o.  Sex:  male  :  1975  MRN:  [id number]  Visit Number:  [id number]  Primary Care Provider:  Provider, No Known         LOS: 9 days       ----------------------------------------------------------------------------------------------------------------------  Subjective       Chief Complaints:    Vomiting, Nausea, and Diarrhea        Interval History:      Patient is sitting up in bed on 2 L nasal cannula in no apparent distress. Denies any complaints at this time. Afebrile, no diarrhea. CRP is improving at 2.79. WBC is slightly elevated at 11.29.    Review of Systems:    Constitutional: no fever, chills and night sweats. No appetite change or unexpected weight change. No fatigue.  Eyes: no eye drainage, itching or redness.  HEENT: no mouth sores, dysphagia or nose bleed.  Respiratory: no for shortness of breath, cough or production of sputum.  Cardiovascular: no chest pain, no palpitations, no orthopnea.  Gastrointestinal: no nausea, vomiting or diarrhea. No abdominal pain, hematemesis or rectal bleeding.  Genitourinary: no dysuria or polyuria.  Hematologic/lymphatic: no lymph node abnormalities, no easy bruising or easy bleeding.  Musculoskeletal: no muscle or joint pain.  Skin: No rash and no itching.  Neurological: no loss of consciousness, no seizure, no headache.  Behavioral/Psych: no depression or suicidal ideation.  Endocrine: no hot flashes.  Immunologic: negative.    ----------------------------------------------------------------------------------------------------------------------      Objective       Current [facility] Meds:  apixaban, 5 mg, Oral, Q12H  ARIPiprazole, 10 mg, Oral, Nightly  atorvastatin, 10 mg, Oral, Nightly  baclofen, 30 mg, Oral, 4x Daily With Meals & Nightly  cholecalciferol, 2,000 Units, Oral, Daily  DULoxetine, 60 mg, Oral, BID  gabapentin, 400 mg, Oral, Q8H  insulin aspart, 0-14 Units,  Subcutaneous, TID AC  insulin aspart, 8 Units, Subcutaneous, TID With Meals  insulin detemir, 10 Units, Subcutaneous, Nightly  lactobacillus acidophilus, 1 capsule, Oral, Daily  magic barrier cream, , Topical, 4x Daily  pantoprazole, 40 mg, Oral, BID AC  sodium chloride, 10 mL, Intravenous, Q12H  sodium hypochlorite, , Topical, BID  tigecycline, 50 mg, Intravenous, Q12H         ----------------------------------------------------------------------------------------------------------------------    Vital Signs:  Temp:  [97.3 °F (36.3 °C)-98.6 °F (37 °C)] 98.1 °F (36.7 °C)  Heart Rate:  [81-96] 85  Resp:  [18-20] 20  BP: (123-144)/(64-87) 132/83  Mean Arterial Pressure (Non-Invasive) for the past 24 hrs (Last 3 readings):   Noninvasive MAP (mmHg)   02/15/21 1802 105   02/15/21 1703 94   02/15/21 1633 106     SpO2 Percentage    02/16/21 0700 02/16/21 0730 02/16/21 1100   SpO2: 99% 99% 99%     SpO2:  [96 %-100 %] 99 %  on  Flow (L/min):  [2] 2;   Device (Oxygen Therapy): room air    Body mass index is 38.74 kg/m².  Wt Readings from Last 3 Encounters:   02/15/21 126 kg (277 lb 12.5 oz)   06/02/20 (!) 150 kg (330 lb)   05/04/20 (!) 150 kg (330 lb)        Intake/Output Summary (Last 24 hours) at 2/16/2021 1417  Last data filed at 2/16/2021 0800  Gross per 24 hour   Intake 2673 ml   Output 1475 ml   Net 1198 ml     Diet Regular; Consistent Carbohydrate  ----------------------------------------------------------------------------------------------------------------------      Physical Exam:    Constitutional:  Well-developed and well-nourished.  No respiratory distress.      HENT:  Head: Normocephalic and atraumatic.  Mouth:  Moist mucous membranes.    Eyes:  Conjunctivae and EOM are normal.  No scleral icterus.  Neck:  Neck supple.  No JVD present.    Cardiovascular:  Normal rate, regular rhythm and normal heart sounds with no murmur. No edema.  Pulmonary/Chest:  No respiratory distress, no wheezes, no crackles, with normal  breath sounds and good air movement.  Abdominal:  Soft.  Bowel sounds are normal. No tenderness or distention. Colostomy and urostomy.  Musculoskeletal: Left AKA, right leg atrophy.  Neurological:  Alert and oriented to person, place, and time.  No facial droop.  No slurred speech.   Skin:  Decubitus ulcers.  Psychiatric:  Normal mood and affect.  Behavior is normal.    ----------------------------------------------------------------------------------------------------------------------          Results from last 7 days   Lab Units 02/13/21  0905   PH, ARTERIAL pH units 7.423   PO2 ART mm Hg 86.6   PCO2, ARTERIAL mm Hg 44.7   HCO3 ART mmol/L 29.2*     Results from last 7 days   Lab Units 02/16/21  0643 02/15/21  0429 02/14/21  0221   CRP mg/dL 2.79* 3.07* 3.43*   WBC 10*3/mm3 11.29* 10.15 10.30   HEMOGLOBIN g/dL 10.1* 10.0* 10.5*   HEMATOCRIT % 36.6* 35.1* 37.2*   MCV fL 80.1 78.9* 80.0   MCHC g/dL 27.6* 28.5* 28.2*   PLATELETS 10*3/mm3 347 371 364     Results from last 7 days   Lab Units 02/16/21  0643 02/15/21  0429 02/14/21  0221   SODIUM mmol/L 133* 135* 136   POTASSIUM mmol/L 4.2 4.0 4.7   CHLORIDE mmol/L 98 101 99   CO2 mmol/L 25.0 23.8 28.1   BUN mg/dL 20 21* 21*   CREATININE mg/dL 0.83 0.68* 0.72*   EGFR IF NONAFRICN AM mL/min/1.73 101 127 119   CALCIUM mg/dL 9.1 8.9 9.4   GLUCOSE mg/dL 375* 336* 324*   ALBUMIN g/dL 3.15* 2.89* 3.24*   BILIRUBIN mg/dL 0.2 <0.2 0.2   ALK PHOS U/L 96 83 84   AST (SGOT) U/L 24 18 23   ALT (SGPT) U/L 25 25 29   Estimated Creatinine Clearance: 155.2 mL/min (by C-G formula based on SCr of 0.83 mg/dL).  No results found for: AMMONIA    Glucose   Date/Time Value Ref Range Status   02/16/2021 1047 347 (H) 70 - 130 mg/dL Final   02/16/2021 0733 329 (H) 70 - 130 mg/dL Final   02/15/2021 2156 334 (H) 70 - 130 mg/dL Final   02/15/2021 1629 240 (H) 70 - 130 mg/dL Final   02/15/2021 1207 318 (H) 70 - 130 mg/dL Final   02/15/2021 0626 377 (H) 70 - 130 mg/dL Final   02/14/2021 2047 350 (H)  70 - 130 mg/dL Final   02/14/2021 1649 275 (H) 70 - 130 mg/dL Final     Lab Results   Component Value Date    HGBA1C 8.90 (H) 10/19/2019     Lab Results   Component Value Date    TSH 1.160 02/07/2021       Blood Culture   Date Value Ref Range Status   02/07/2021 No growth at 3 days  Preliminary   02/07/2021 No growth at 3 days  Preliminary     Urine Culture   Date Value Ref Range Status   02/07/2021 (A)  Final    >100,000 CFU/mL Klebsiella pneumoniae ssp pneumoniae     No results found for: WOUNDCX  No results found for: STOOLCX  No results found for: RESPCX  Pain Management Panel     Pain Management Panel Latest Ref Rng & Units 8/19/2018 12/5/2017    AMPHETAMINES SCREEN, URINE Negative Negative Negative    BARBITURATES SCREEN Negative Negative Negative    BENZODIAZEPINE SCREEN, URINE Negative Negative Negative    BUPRENORPHINEUR Negative Negative Negative    COCAINE SCREEN, URINE Negative Negative Negative    METHADONE SCREEN, URINE Negative Negative Negative            ----------------------------------------------------------------------------------------------------------------------  Imaging Results (Last 24 Hours)     ** No results found for the last 24 hours. **          ----------------------------------------------------------------------------------------------------------------------    Assessment/Plan       Assessment/Plan     ASSESSMENT:    1.  Severe sepsis with lactic acid greater than 2 on admission  2.  UTI  3.  Osteomyelitis    PLAN:    Patient is sitting up in bed on 2 L nasal cannula in no apparent distress. Denies any complaints at this time. Afebrile, no diarrhea. CRP is improving at 2.79. WBC is slightly elevated at 11.29. Wound culture on 2/11/2021 finalized as scant growth of MDRO Acinetobacter and rare staph aureus.    Chest x-ray on 2/13/2021 showed no acute disease.  X-ray of the pelvis on 2/12/2021 showed chronic deformity in the right hip and no convincing evidence of an acute fracture.   X-ray of the right femur on 2/12/2021 showed chronic changes but no acute bony abnormality. Wound culture is so far showing growth of MDRO Acinetobacter baumannii and rare staph aureus. MRI of pelvis with and without contrast on 2/11/2021 showed old fracture of the proximal right femur.  There is surrounding fluid and minimal increased signal in the proximal right femur.  This would support a diagnosis of osteomyelitis.  Minimal fluid in left hip as well.      COVID-19 PCR negative.  Blood culture finalized as no growth.  Lactic acid 3.4 on admission.  GI panel negative.  Urinalysis positive with urine culture finalizing as Klebsiella pneumoniae. CT of the abdomen and pelvis from 2/7/2021 reports multiple dilated loops of small bowel that may represent potential small bowel obstruction versus ileus.    As the wound culture finalized as MDRO Acinetobacter baumannii and MSSA, recommend to continue on Tygacil 50 mg IV q 12 hours through 3/14/2021.     Code Status:   Code Status and Medical Interventions:   Ordered at: 02/07/21 1753     Code Status:    CPR     Medical Interventions (Level of Support Prior to Arrest):    Full       KAYLAH Rhodes  02/16/21  14:17 EST

## 2021-02-16 NOTE — PLAN OF CARE
Goal Outcome Evaluation:  Plan of Care Reviewed With: patient  Progress: no change  Outcome Summary: Pt been resting comfortably in today, no complaints noted. Wound Care came around and placed wound vac on pt this morning. Dr. Moscoso explained to pt that he is needing a PICC line instead of the midline, needs IV abx for a few more weeks. No issues noted.

## 2021-02-16 NOTE — PROGRESS NOTES
Discharge Planning Assessment   Fabricio     Patient Name: Ken Krueger  MRN: [id number]  Today's Date: 2/16/2021    Admit Date: 2/7/2021    Discharge Plan     Row Name 02/16/21 1216       Plan    Plan SS contacted [facility] per To who states he will contact SS back today with copay for IV antibiotics. SS to follow.    14:29 pm: SS received message from [facility] per To and pt has $0 copay for IV Tygacil. SS to follow.        BRENT Burns

## 2021-02-16 NOTE — PLAN OF CARE
Goal Outcome Evaluation:  Plan of Care Reviewed With: patient  Progress: no change  Outcome Summary: pt transferred to the floor tonight. VSS. denies pain or discomfort.

## 2021-02-16 NOTE — PROGRESS NOTES
Subjective     History:   Ken Krueger is a 45 y.o. male admitted on 2/7/2021 secondary to Severe sepsis (CMS/[facility])     Procedures:   2/12/21: Left midline catheter placement placement     CC: Follow up severe sepsis     Patient seen and examined with SHANHAZ Harrison. Awake and alert. Denies any current complaints. Denies CP, dyspnea or cough. Denies nausea or vomiting. Tolerating PO intake. Hyperglycemic. No acute events overnight per RN.     History taken from: patient, chart, and RN.      Objective     Vital Signs  Temp:  [97.3 °F (36.3 °C)-98.9 °F (37.2 °C)] 98.9 °F (37.2 °C)  Heart Rate:  [78-96] 78  Resp:  [18-20] 20  BP: (123-136)/(64-87) 128/78    Intake/Output Summary (Last 24 hours) at 2/16/2021 1748  Last data filed at 2/16/2021 1700  Gross per 24 hour   Intake 3617.1 ml   Output 1475 ml   Net 2142.1 ml         Physical Exam: Unchanged from previous   General:    Awake, alert, in no acute distress   Heart:      Normal S1 and S2. Regular rate and rhythm. No significant murmur, rubs or gallops appreciated.   Lungs:     Respirations regular, even and unlabored. Lungs clear to auscultation B/L. No wheezes, rales or rhonchi.   Abdomen:   Soft and nontender. No guarding, rebound tenderness or  organomegaly noted. Bowel sounds present x 4. (+) urostomy and colostomy.    Extremities:  (+) AKA. No clubbing, cyanosis or edema noted in RLE.      Results Review:    Results from last 7 days   Lab Units 02/16/21  0643 02/15/21  0429 02/14/21  0221 02/13/21  0210 02/12/21  0053 02/11/21  0051 02/10/21  0033   WBC 10*3/mm3 11.29* 10.15 10.30 10.36 9.11 9.01 8.06   HEMOGLOBIN g/dL 10.1* 10.0* 10.5* 10.5* 10.5* 10.6* 10.3*   PLATELETS 10*3/mm3 347 371 364 400 396 393 404     Results from last 7 days   Lab Units 02/16/21  0643 02/15/21  0429 02/14/21  0221 02/13/21  0210 02/12/21  0053 02/11/21  0051 02/10/21  1720 02/10/21  0033   SODIUM mmol/L 133* 135* 136 138 138 128*  --  137   POTASSIUM mmol/L 4.2 4.0 4.7 4.0 4.2 3.8 4.0  3.5   CHLORIDE mmol/L 98 101 99 102 103 95*  --  104   CO2 mmol/L 25.0 23.8 28.1 25.7 24.9 23.0  --  22.3   BUN mg/dL 20 21* 21* 15 16 13  --  15   CREATININE mg/dL 0.83 0.68* 0.72* 0.67* 0.74* 0.62*  --  0.82   CALCIUM mg/dL 9.1 8.9 9.4 9.5 9.4 9.9  --  9.0   GLUCOSE mg/dL 375* 336* 324* 281* 313* 235*  --  211*     Results from last 7 days   Lab Units 02/16/21  0643 02/15/21  0429 02/14/21  0221   BILIRUBIN mg/dL 0.2 <0.2 0.2   ALK PHOS U/L 96 83 84   AST (SGOT) U/L 24 18 23   ALT (SGPT) U/L 25 25 29                   Imaging Results (Last 24 Hours)     ** No results found for the last 24 hours. **            Medications:  apixaban, 5 mg, Oral, Q12H  ARIPiprazole, 10 mg, Oral, Nightly  atorvastatin, 10 mg, Oral, Nightly  baclofen, 30 mg, Oral, 4x Daily With Meals & Nightly  cholecalciferol, 2,000 Units, Oral, Daily  DULoxetine, 60 mg, Oral, BID  gabapentin, 400 mg, Oral, Q8H  insulin aspart, 0-14 Units, Subcutaneous, TID AC  insulin aspart, 8 Units, Subcutaneous, TID With Meals  insulin detemir, 10 Units, Subcutaneous, Nightly  lactobacillus acidophilus, 1 capsule, Oral, Daily  magic barrier cream, , Topical, 4x Daily  pantoprazole, 40 mg, Oral, BID AC  sodium chloride, 10 mL, Intravenous, Q12H  sodium hypochlorite, , Topical, BID  tigecycline, 50 mg, Intravenous, Q12H               Assessment/Plan   Severe sepsis: Likely 2/2 complicated UTI and osteomyelitis associated with unstageable decubitus ulcers. Afebrile and hemodynamically stable. WBC mildly elevated today. CRP improved and stable today. Lactate normalized soon upon admission. Urine culture revealed Klebsiella pneumoniae and course of Rocephin completed. Wound culture revealing scant growth of Acinetobacter and currently on Tygacil per ID recs. Repeat labs in the AM. ID input appreciated.     Klebsiella UTI: Course of Rocephin completed.     Osteomyelitis associated with decubitus ulcers: MRI reveals old fracture of the proximal right femur with  surrounding fluid and minimal increased signal in the proximal right femur concerning for osteomyelitis. Cont wound vac. Cont Tygacil through 3/14/21 per ID recs. Midline placed and I have ordered PICC consult as pt will require prolonged course of IV antibiotics. ID input appreciated.     Nausea and vomiting with concern for ileus vs SBO: CT abd/pelvis revealed multiple dilated loops of bowel and significant free fluid. Surgery consulted who felt his presentation may be more consistent with gastroenteritis. Now resolved. Diet has been advanced and he is tolerating PO intake well. Surgery input appreciated.     JAKE: Possibly 2/2 above. Improved with IV fluids and treatment of sepsis. Creatine stable today. Cont to monitor.     Chronic right femur fracture: Ortho consulted with recs to continue current management.    DM II, insulin dependent with hyperglycemia: HgbA1c 8.9. Cont basal and sliding scale. Increase meal time insulin. Cont to monitor.     Acute hypoxic respiratory failure: Episodes of desaturation noted on home CPAP. Noncompliance with CPAP noted as well. Pt encouraged to use hospital CPAP with home settings to see if he needs adjustments or supplemental O2. Cont to encourage use of CPAP.    Hypokalemia: Improved with supplementation. Mg initially <2 but improved with supplementation.     Hx of PE and DVT: Cont Eliquis.     DVT PPX: Eliquis    Disposition: Home with IV abx once PICC placed.         Javier Moscoso DO  02/16/21  17:48 EST

## 2021-02-17 LAB
ANION GAP SERPL CALCULATED.3IONS-SCNC: 9.3 MMOL/L (ref 5–15)
ANISOCYTOSIS BLD QL: NORMAL
BASOPHILS # BLD AUTO: 0.04 10*3/MM3 (ref 0–0.2)
BASOPHILS NFR BLD AUTO: 0.4 % (ref 0–1.5)
BUN SERPL-MCNC: 18 MG/DL (ref 6–20)
BUN/CREAT SERPL: 26.5 (ref 7–25)
CALCIUM SPEC-SCNC: 8.7 MG/DL (ref 8.6–10.5)
CHLORIDE SERPL-SCNC: 100 MMOL/L (ref 98–107)
CO2 SERPL-SCNC: 24.7 MMOL/L (ref 22–29)
CREAT SERPL-MCNC: 0.68 MG/DL (ref 0.76–1.27)
CRP SERPL-MCNC: 2.97 MG/DL (ref 0–0.5)
DEPRECATED RDW RBC AUTO: 45.6 FL (ref 37–54)
EOSINOPHIL # BLD AUTO: 0.15 10*3/MM3 (ref 0–0.4)
EOSINOPHIL NFR BLD AUTO: 1.5 % (ref 0.3–6.2)
ERYTHROCYTE [DISTWIDTH] IN BLOOD BY AUTOMATED COUNT: 16 % (ref 12.3–15.4)
GFR SERPL CREATININE-BSD FRML MDRD: 127 ML/MIN/1.73
GLUCOSE BLDC GLUCOMTR-MCNC: 226 MG/DL (ref 70–130)
GLUCOSE BLDC GLUCOMTR-MCNC: 255 MG/DL (ref 70–130)
GLUCOSE BLDC GLUCOMTR-MCNC: 352 MG/DL (ref 70–130)
GLUCOSE BLDC GLUCOMTR-MCNC: 393 MG/DL (ref 70–130)
GLUCOSE SERPL-MCNC: 294 MG/DL (ref 65–99)
HBA1C MFR BLD: 10.8 % (ref 4.8–5.6)
HCT VFR BLD AUTO: 35.9 % (ref 37.5–51)
HGB BLD-MCNC: 10.2 G/DL (ref 13–17.7)
HYPOCHROMIA BLD QL: NORMAL
IMM GRANULOCYTES # BLD AUTO: 0.03 10*3/MM3 (ref 0–0.05)
IMM GRANULOCYTES NFR BLD AUTO: 0.3 % (ref 0–0.5)
LYMPHOCYTES # BLD AUTO: 2.58 10*3/MM3 (ref 0.7–3.1)
LYMPHOCYTES NFR BLD AUTO: 25.4 % (ref 19.6–45.3)
MCH RBC QN AUTO: 22.7 PG (ref 26.6–33)
MCHC RBC AUTO-ENTMCNC: 28.4 G/DL (ref 31.5–35.7)
MCV RBC AUTO: 79.8 FL (ref 79–97)
MICROCYTES BLD QL: NORMAL
MONOCYTES # BLD AUTO: 0.31 10*3/MM3 (ref 0.1–0.9)
MONOCYTES NFR BLD AUTO: 3 % (ref 5–12)
NEUTROPHILS NFR BLD AUTO: 69.4 % (ref 42.7–76)
NEUTROPHILS NFR BLD AUTO: 7.06 10*3/MM3 (ref 1.7–7)
NRBC BLD AUTO-RTO: 0 /100 WBC (ref 0–0.2)
PLAT MORPH BLD: NORMAL
PLATELET # BLD AUTO: 334 10*3/MM3 (ref 140–450)
PMV BLD AUTO: 10.6 FL (ref 6–12)
POTASSIUM SERPL-SCNC: 3.9 MMOL/L (ref 3.5–5.2)
RBC # BLD AUTO: 4.5 10*6/MM3 (ref 4.14–5.8)
SODIUM SERPL-SCNC: 134 MMOL/L (ref 136–145)
WBC # BLD AUTO: 10.17 10*3/MM3 (ref 3.4–10.8)

## 2021-02-17 PROCEDURE — 63710000001 INSULIN DETEMIR PER 5 UNITS: Performed by: INTERNAL MEDICINE

## 2021-02-17 PROCEDURE — 80048 BASIC METABOLIC PNL TOTAL CA: CPT | Performed by: INTERNAL MEDICINE

## 2021-02-17 PROCEDURE — 85025 COMPLETE CBC W/AUTO DIFF WBC: CPT | Performed by: INTERNAL MEDICINE

## 2021-02-17 PROCEDURE — 85007 BL SMEAR W/DIFF WBC COUNT: CPT | Performed by: INTERNAL MEDICINE

## 2021-02-17 PROCEDURE — 94799 UNLISTED PULMONARY SVC/PX: CPT

## 2021-02-17 PROCEDURE — 99233 SBSQ HOSP IP/OBS HIGH 50: CPT | Performed by: NURSE PRACTITIONER

## 2021-02-17 PROCEDURE — 82962 GLUCOSE BLOOD TEST: CPT

## 2021-02-17 PROCEDURE — 83036 HEMOGLOBIN GLYCOSYLATED A1C: CPT | Performed by: NURSE PRACTITIONER

## 2021-02-17 PROCEDURE — 05HY33Z INSERTION OF INFUSION DEVICE INTO UPPER VEIN, PERCUTANEOUS APPROACH: ICD-10-PCS | Performed by: INTERNAL MEDICINE

## 2021-02-17 PROCEDURE — 63710000001 INSULIN ASPART PER 5 UNITS: Performed by: INTERNAL MEDICINE

## 2021-02-17 PROCEDURE — 86140 C-REACTIVE PROTEIN: CPT | Performed by: INTERNAL MEDICINE

## 2021-02-17 RX ORDER — SODIUM CHLORIDE 0.9 % (FLUSH) 0.9 %
10 SYRINGE (ML) INJECTION AS NEEDED
Status: DISCONTINUED | OUTPATIENT
Start: 2021-02-17 | End: 2021-02-18 | Stop reason: HOSPADM

## 2021-02-17 RX ORDER — SODIUM CHLORIDE 0.9 % (FLUSH) 0.9 %
10 SYRINGE (ML) INJECTION EVERY 12 HOURS SCHEDULED
Status: DISCONTINUED | OUTPATIENT
Start: 2021-02-17 | End: 2021-02-18 | Stop reason: HOSPADM

## 2021-02-17 RX ORDER — SODIUM CHLORIDE 0.9 % (FLUSH) 0.9 %
20 SYRINGE (ML) INJECTION AS NEEDED
Status: DISCONTINUED | OUTPATIENT
Start: 2021-02-17 | End: 2021-02-18 | Stop reason: HOSPADM

## 2021-02-17 RX ADMIN — INSULIN ASPART 10 UNITS: 100 INJECTION, SOLUTION INTRAVENOUS; SUBCUTANEOUS at 17:51

## 2021-02-17 RX ADMIN — SODIUM CHLORIDE 50 MG: 900 INJECTION INTRAVENOUS at 01:18

## 2021-02-17 RX ADMIN — INSULIN DETEMIR 10 UNITS: 100 INJECTION, SOLUTION SUBCUTANEOUS at 22:20

## 2021-02-17 RX ADMIN — APIXABAN 5 MG: 5 TABLET, FILM COATED ORAL at 08:28

## 2021-02-17 RX ADMIN — Medication: at 17:51

## 2021-02-17 RX ADMIN — INSULIN ASPART 8 UNITS: 100 INJECTION, SOLUTION INTRAVENOUS; SUBCUTANEOUS at 08:29

## 2021-02-17 RX ADMIN — ARIPIPRAZOLE 10 MG: 10 TABLET ORAL at 22:18

## 2021-02-17 RX ADMIN — ATORVASTATIN CALCIUM 10 MG: 10 TABLET, FILM COATED ORAL at 22:18

## 2021-02-17 RX ADMIN — Medication: at 08:28

## 2021-02-17 RX ADMIN — BACLOFEN 30 MG: 10 TABLET ORAL at 08:29

## 2021-02-17 RX ADMIN — APIXABAN 5 MG: 5 TABLET, FILM COATED ORAL at 22:18

## 2021-02-17 RX ADMIN — CHOLECALCIFEROL TAB 10 MCG (400 UNIT) 2000 UNITS: 10 TAB at 08:28

## 2021-02-17 RX ADMIN — SODIUM CHLORIDE, PRESERVATIVE FREE 10 ML: 5 INJECTION INTRAVENOUS at 08:29

## 2021-02-17 RX ADMIN — BACLOFEN 30 MG: 10 TABLET ORAL at 22:18

## 2021-02-17 RX ADMIN — PANTOPRAZOLE SODIUM 40 MG: 40 TABLET, DELAYED RELEASE ORAL at 08:28

## 2021-02-17 RX ADMIN — GABAPENTIN 400 MG: 400 CAPSULE ORAL at 22:18

## 2021-02-17 RX ADMIN — BACLOFEN 30 MG: 10 TABLET ORAL at 11:41

## 2021-02-17 RX ADMIN — SODIUM CHLORIDE, PRESERVATIVE FREE 10 ML: 5 INJECTION INTRAVENOUS at 22:19

## 2021-02-17 RX ADMIN — Medication 1 CAPSULE: at 08:28

## 2021-02-17 RX ADMIN — Medication: at 22:17

## 2021-02-17 RX ADMIN — DULOXETINE HYDROCHLORIDE 60 MG: 60 CAPSULE, DELAYED RELEASE ORAL at 22:18

## 2021-02-17 RX ADMIN — BACLOFEN 30 MG: 10 TABLET ORAL at 17:51

## 2021-02-17 RX ADMIN — PANTOPRAZOLE SODIUM 40 MG: 40 TABLET, DELAYED RELEASE ORAL at 17:51

## 2021-02-17 RX ADMIN — Medication: at 11:41

## 2021-02-17 RX ADMIN — INSULIN ASPART 8 UNITS: 100 INJECTION, SOLUTION INTRAVENOUS; SUBCUTANEOUS at 11:40

## 2021-02-17 RX ADMIN — GABAPENTIN 400 MG: 400 CAPSULE ORAL at 05:07

## 2021-02-17 RX ADMIN — GABAPENTIN 400 MG: 400 CAPSULE ORAL at 15:45

## 2021-02-17 RX ADMIN — SODIUM HYPOCHLORITE: 1.25 SOLUTION TOPICAL at 22:19

## 2021-02-17 RX ADMIN — SODIUM HYPOCHLORITE: 1.25 SOLUTION TOPICAL at 15:46

## 2021-02-17 RX ADMIN — INSULIN ASPART 5 UNITS: 100 INJECTION, SOLUTION INTRAVENOUS; SUBCUTANEOUS at 08:29

## 2021-02-17 RX ADMIN — SODIUM CHLORIDE 50 MG: 900 INJECTION INTRAVENOUS at 15:45

## 2021-02-17 RX ADMIN — INSULIN ASPART 8 UNITS: 100 INJECTION, SOLUTION INTRAVENOUS; SUBCUTANEOUS at 17:51

## 2021-02-17 RX ADMIN — INSULIN ASPART 8 UNITS: 100 INJECTION, SOLUTION INTRAVENOUS; SUBCUTANEOUS at 11:41

## 2021-02-17 RX ADMIN — DULOXETINE HYDROCHLORIDE 60 MG: 60 CAPSULE, DELAYED RELEASE ORAL at 08:28

## 2021-02-17 RX ADMIN — SODIUM CHLORIDE, PRESERVATIVE FREE 10 ML: 5 INJECTION INTRAVENOUS at 22:20

## 2021-02-17 NOTE — PROGRESS NOTES
PROGRESS NOTE         Patient Identification:  Name:  Ken Krueger  Age:  45 y.o.  Sex:  male  :  1975  MRN:  [id number]  Visit Number:  [id number]  Primary Care Provider:  Provider, No Known         LOS: 10 days       ----------------------------------------------------------------------------------------------------------------------  Subjective       Chief Complaints:    Vomiting, Nausea, and Diarrhea        Interval History:      Patient is sitting up in bed on 2 L nasal cannula in no apparent distress.  Complains of a productive cough today, but CRP is improving at 2.97 and leukocytosis is resolved.  For now, we will monitor for worsening of symptoms.  Afebrile, no diarrhea.    Review of Systems:    Constitutional: no fever, chills and night sweats. No appetite change or unexpected weight change. No fatigue.  Eyes: no eye drainage, itching or redness.  HEENT: no mouth sores, dysphagia or nose bleed.  Respiratory: no for shortness of breath.  Productive cough.  Cardiovascular: no chest pain, no palpitations, no orthopnea.  Gastrointestinal: no nausea, vomiting or diarrhea. No abdominal pain, hematemesis or rectal bleeding.  Genitourinary: no dysuria or polyuria.  Hematologic/lymphatic: no lymph node abnormalities, no easy bruising or easy bleeding.  Musculoskeletal: no muscle or joint pain.  Skin: No rash and no itching.  Neurological: no loss of consciousness, no seizure, no headache.  Behavioral/Psych: no depression or suicidal ideation.  Endocrine: no hot flashes.  Immunologic: negative.    ----------------------------------------------------------------------------------------------------------------------      Objective       [facility]:  apixaban, 5 mg, Oral, Q12H  ARIPiprazole, 10 mg, Oral, Nightly  atorvastatin, 10 mg, Oral, Nightly  baclofen, 30 mg, Oral, 4x Daily With Meals & Nightly  cholecalciferol, 2,000 Units, Oral, Daily  DULoxetine, 60 mg, Oral, BID  gabapentin, 400  mg, Oral, Q8H  insulin aspart, 0-14 Units, Subcutaneous, TID AC  insulin aspart, 8 Units, Subcutaneous, TID With Meals  insulin detemir, 10 Units, Subcutaneous, Nightly  lactobacillus acidophilus, 1 capsule, Oral, Daily  magic barrier cream, , Topical, 4x Daily  pantoprazole, 40 mg, Oral, BID AC  sodium chloride, 10 mL, Intravenous, Q12H  sodium hypochlorite, , Topical, BID  tigecycline, 50 mg, Intravenous, Q12H         ----------------------------------------------------------------------------------------------------------------------    Vital Signs:  Temp:  [97.8 °F (36.6 °C)-99 °F (37.2 °C)] 98.3 °F (36.8 °C)  Heart Rate:  [78-90] 80  Resp:  [20] 20  BP: (124-137)/(65-78) 124/70  No data found.  SpO2 Percentage    02/17/21 0659 02/17/21 0700 02/17/21 1100   SpO2: 99% 98% 98%     SpO2:  [98 %-99 %] 98 %  on  Flow (L/min):  [1-2] 1;   Device (Oxygen Therapy): nasal cannula    Body mass index is 38.74 kg/m².  Wt Readings from Last 3 Encounters:   02/15/21 126 kg (277 lb 12.5 oz)   06/02/20 (!) 150 kg (330 lb)   05/04/20 (!) 150 kg (330 lb)        Intake/Output Summary (Last 24 hours) at 2/17/2021 1252  Last data filed at 2/17/2021 0700  Gross per 24 hour   Intake 1505.1 ml   Output 2100 ml   Net -594.9 ml     Diet Regular; Consistent Carbohydrate  ----------------------------------------------------------------------------------------------------------------------      Physical Exam:    Constitutional:  Well-developed and well-nourished.  No respiratory distress.      HENT:  Head: Normocephalic and atraumatic.  Mouth:  Moist mucous membranes.    Eyes:  Conjunctivae and EOM are normal.  No scleral icterus.  Neck:  Neck supple.  No JVD present.    Cardiovascular:  Normal rate, regular rhythm and normal heart sounds with no murmur. No edema.  Pulmonary/Chest:  No respiratory distress, no wheezes, no crackles, with normal breath sounds and good air movement.  Abdominal:  Soft.  Bowel sounds are normal. No tenderness or  distention. Colostomy and urostomy.  Musculoskeletal: Left AKA, right leg atrophy.  Neurological:  Alert and oriented to person, place, and time.  No facial droop.  No slurred speech.   Skin:  Decubitus ulcers.  Psychiatric:  Normal mood and affect.  Behavior is normal.    ----------------------------------------------------------------------------------------------------------------------          Results from last 7 days   Lab Units 02/13/21  0905   PH, ARTERIAL pH units 7.423   PO2 ART mm Hg 86.6   PCO2, ARTERIAL mm Hg 44.7   HCO3 ART mmol/L 29.2*     Results from last 7 days   Lab Units 02/17/21  0944 02/16/21 0643 02/15/21  0429   CRP mg/dL 2.97* 2.79* 3.07*   WBC 10*3/mm3 10.17 11.29* 10.15   HEMOGLOBIN g/dL 10.2* 10.1* 10.0*   HEMATOCRIT % 35.9* 36.6* 35.1*   MCV fL 79.8 80.1 78.9*   MCHC g/dL 28.4* 27.6* 28.5*   PLATELETS 10*3/mm3 334 347 371     Results from last 7 days   Lab Units 02/17/21  0944 02/16/21  0643 02/15/21  0429 02/14/21  0221   SODIUM mmol/L 134* 133* 135* 136   POTASSIUM mmol/L 3.9 4.2 4.0 4.7   CHLORIDE mmol/L 100 98 101 99   CO2 mmol/L 24.7 25.0 23.8 28.1   BUN mg/dL 18 20 21* 21*   CREATININE mg/dL 0.68* 0.83 0.68* 0.72*   EGFR IF NONAFRICN AM mL/min/1.73 127 101 127 119   CALCIUM mg/dL 8.7 9.1 8.9 9.4   GLUCOSE mg/dL 294* 375* 336* 324*   ALBUMIN g/dL  --  3.15* 2.89* 3.24*   BILIRUBIN mg/dL  --  0.2 <0.2 0.2   ALK PHOS U/L  --  96 83 84   AST (SGOT) U/L  --  24 18 23   ALT (SGPT) U/L  --  25 25 29   Estimated Creatinine Clearance: 189.4 mL/min (A) (by C-G formula based on SCr of 0.68 mg/dL (L)).  No results found for: AMMONIA    Hemoglobin A1C   Date/Time Value Ref Range Status   02/17/2021 0944 10.80 (H) 4.80 - 5.60 % Final     Glucose   Date/Time Value Ref Range Status   02/17/2021 1107 255 (H) 70 - 130 mg/dL Final   02/17/2021 0643 226 (H) 70 - 130 mg/dL Final   02/16/2021 2158 172 (H) 70 - 130 mg/dL Final   02/16/2021 1618 229 (H) 70 - 130 mg/dL Final   02/16/2021 1047 347 (H) 70 -  130 mg/dL Final   02/16/2021 0733 329 (H) 70 - 130 mg/dL Final   02/15/2021 2156 334 (H) 70 - 130 mg/dL Final   02/15/2021 1629 240 (H) 70 - 130 mg/dL Final     Lab Results   Component Value Date    HGBA1C 10.80 (H) 02/17/2021     Lab Results   Component Value Date    TSH 1.160 02/07/2021       Blood Culture   Date Value Ref Range Status   02/07/2021 No growth at 3 days  Preliminary   02/07/2021 No growth at 3 days  Preliminary     Urine Culture   Date Value Ref Range Status   02/07/2021 (A)  Final    >100,000 CFU/mL Klebsiella pneumoniae ssp pneumoniae     No results found for: WOUNDCX  No results found for: STOOLCX  No results found for: RESPCX  Pain Management Panel     Pain Management Panel Latest Ref Rng & Units 8/19/2018 12/5/2017    AMPHETAMINES SCREEN, URINE Negative Negative Negative    BARBITURATES SCREEN Negative Negative Negative    BENZODIAZEPINE SCREEN, URINE Negative Negative Negative    BUPRENORPHINEUR Negative Negative Negative    COCAINE SCREEN, URINE Negative Negative Negative    METHADONE SCREEN, URINE Negative Negative Negative            ----------------------------------------------------------------------------------------------------------------------  Imaging Results (Last 24 Hours)     ** No results found for the last 24 hours. **          ----------------------------------------------------------------------------------------------------------------------    Assessment/Plan       Assessment/Plan     ASSESSMENT:    1.  Severe sepsis with lactic acid greater than 2 on admission  2.  UTI  3.  Osteomyelitis    PLAN:    Patient is sitting up in bed on 2 L nasal cannula in no apparent distress.  Complains of a productive cough today, but CRP is improving at 2.97 and leukocytosis is resolved.  For now, we will monitor for worsening of symptoms.  Afebrile, no diarrhea. Wound culture on 2/11/2021 finalized as scant growth of MDRO Acinetobacter and rare staph aureus.    Chest x-ray on 2/13/2021  showed no acute disease.  X-ray of the pelvis on 2/12/2021 showed chronic deformity in the right hip and no convincing evidence of an acute fracture.  X-ray of the right femur on 2/12/2021 showed chronic changes but no acute bony abnormality. Wound culture is so far showing growth of MDRO Acinetobacter baumannii and rare staph aureus. MRI of pelvis with and without contrast on 2/11/2021 showed old fracture of the proximal right femur.  There is surrounding fluid and minimal increased signal in the proximal right femur.  This would support a diagnosis of osteomyelitis.  Minimal fluid in left hip as well.      COVID-19 PCR negative.  Blood culture finalized as no growth.  Lactic acid 3.4 on admission.  GI panel negative.  Urinalysis positive with urine culture finalizing as Klebsiella pneumoniae. CT of the abdomen and pelvis from 2/7/2021 reports multiple dilated loops of small bowel that may represent potential small bowel obstruction versus ileus.    As the wound culture finalized as MDRO Acinetobacter baumannii and MSSA, recommend to continue on Tygacil 50 mg IV q 12 hours through 3/14/2021.     Code Status:   Code Status and Medical Interventions:   Ordered at: 02/07/21 1753     Code Status:    CPR     Medical Interventions (Level of Support Prior to Arrest):    Full     Scribed for Tra Jain MD by KAYLAH Rhodes. 2/17/2021  13:06 EST  KAYLAH Rhodes  02/17/21  12:52 EST    Physician Attestation:    The documentation recorded by the scribe accurately reflects the service I personally performed and the decisions made by me.    Tra Jain MD  Infectious Diseases  02/17/21  13:48 EST

## 2021-02-17 NOTE — PLAN OF CARE
Goal Outcome Evaluation:         Patient resting in bed. PICC line in place.  On 1L of oxygen.  No acute changes noted.
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
## 2021-02-17 NOTE — PRE-PROCEDURE NOTE
"Assessment:  Diagnosis: sepsis    Allergies   Allergen Reactions   • Keflex [Cephalexin] Rash     Patient states \"red man syndrome\"\  Patient has tolerated ceftriaxone in the past   • Heparin Hives       Order Date/Time: 02/17/2021  Indications: Long Term Antibiotics   LABS:  Lab Results   Component Value Date    INR 1.12 (H) 09/10/2018    PROTIME 14.6 09/10/2018     Lab Results   Component Value Date    PTT 29.8 09/10/2018     Lab Results   Component Value Date    WBC 10.17 02/17/2021    HGB 10.2 (L) 02/17/2021    HCT 35.9 (L) 02/17/2021    MCV 79.8 02/17/2021     02/17/2021     Lab Results   Component Value Date    BUN 18 02/17/2021     Lab Results   Component Value Date    CREATININE 0.68 (L) 02/17/2021     Lab Results   Component Value Date    EGFRIFNONA 127 02/17/2021     Labs Reviewed: WNL    Contraindications for PICC/Midline:   No Contraindication      Recommendations:  Peripherally inserted central catheter    Procedure Time Out:  Time out Time: 1435  Correct Patient Identity: Yes  Correct Surgical Side and Site Are Marked: Yes  Agreement on Procedure to be done: Yes  Antibiotic Given: N/A  RN: Felicia Marcelino RN  RN: Missy Chang RN  Other: AINSLEY Okeefe RN    "
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
## 2021-02-17 NOTE — PLAN OF CARE
Goal Outcome Evaluation:        Outcome Summary: Patient has rested in bed this shift, no complaints or request, wound vac in place, wound care completed per order, no acute changes at this time, will continue to monitor.

## 2021-02-18 VITALS
DIASTOLIC BLOOD PRESSURE: 75 MMHG | RESPIRATION RATE: 18 BRPM | TEMPERATURE: 97.8 F | BODY MASS INDEX: 38.89 KG/M2 | OXYGEN SATURATION: 97 % | HEART RATE: 78 BPM | HEIGHT: 71 IN | SYSTOLIC BLOOD PRESSURE: 123 MMHG | WEIGHT: 277.78 LBS

## 2021-02-18 LAB
ALBUMIN SERPL-MCNC: 2.81 G/DL (ref 3.5–5.2)
ALBUMIN/GLOB SERPL: 0.7 G/DL
ALP SERPL-CCNC: 103 U/L (ref 39–117)
ALT SERPL W P-5'-P-CCNC: 25 U/L (ref 1–41)
ANION GAP SERPL CALCULATED.3IONS-SCNC: 8.5 MMOL/L (ref 5–15)
ANISOCYTOSIS BLD QL: NORMAL
AST SERPL-CCNC: 21 U/L (ref 1–40)
BASOPHILS # BLD AUTO: 0.04 10*3/MM3 (ref 0–0.2)
BASOPHILS NFR BLD AUTO: 0.4 % (ref 0–1.5)
BILIRUB SERPL-MCNC: <0.2 MG/DL (ref 0–1.2)
BUN SERPL-MCNC: 16 MG/DL (ref 6–20)
BUN/CREAT SERPL: 22.2 (ref 7–25)
CALCIUM SPEC-SCNC: 8.6 MG/DL (ref 8.6–10.5)
CHLORIDE SERPL-SCNC: 101 MMOL/L (ref 98–107)
CO2 SERPL-SCNC: 24.5 MMOL/L (ref 22–29)
CREAT SERPL-MCNC: 0.72 MG/DL (ref 0.76–1.27)
CRP SERPL-MCNC: 2.64 MG/DL (ref 0–0.5)
DEPRECATED RDW RBC AUTO: 47.9 FL (ref 37–54)
EOSINOPHIL # BLD AUTO: 0.16 10*3/MM3 (ref 0–0.4)
EOSINOPHIL NFR BLD AUTO: 1.7 % (ref 0.3–6.2)
ERYTHROCYTE [DISTWIDTH] IN BLOOD BY AUTOMATED COUNT: 16.1 % (ref 12.3–15.4)
GFR SERPL CREATININE-BSD FRML MDRD: 119 ML/MIN/1.73
GLOBULIN UR ELPH-MCNC: 4 GM/DL
GLUCOSE BLDC GLUCOMTR-MCNC: 277 MG/DL (ref 70–130)
GLUCOSE BLDC GLUCOMTR-MCNC: 335 MG/DL (ref 70–130)
GLUCOSE SERPL-MCNC: 352 MG/DL (ref 65–99)
HCT VFR BLD AUTO: 35.4 % (ref 37.5–51)
HGB BLD-MCNC: 9.6 G/DL (ref 13–17.7)
HYPOCHROMIA BLD QL: NORMAL
IMM GRANULOCYTES # BLD AUTO: 0.04 10*3/MM3 (ref 0–0.05)
IMM GRANULOCYTES NFR BLD AUTO: 0.4 % (ref 0–0.5)
LYMPHOCYTES # BLD AUTO: 2.61 10*3/MM3 (ref 0.7–3.1)
LYMPHOCYTES NFR BLD AUTO: 28.2 % (ref 19.6–45.3)
MCH RBC QN AUTO: 22.1 PG (ref 26.6–33)
MCHC RBC AUTO-ENTMCNC: 27.1 G/DL (ref 31.5–35.7)
MCV RBC AUTO: 81.4 FL (ref 79–97)
MONOCYTES # BLD AUTO: 0.3 10*3/MM3 (ref 0.1–0.9)
MONOCYTES NFR BLD AUTO: 3.2 % (ref 5–12)
NEUTROPHILS NFR BLD AUTO: 6.09 10*3/MM3 (ref 1.7–7)
NEUTROPHILS NFR BLD AUTO: 66.1 % (ref 42.7–76)
NRBC BLD AUTO-RTO: 0 /100 WBC (ref 0–0.2)
PLAT MORPH BLD: NORMAL
PLATELET # BLD AUTO: 309 10*3/MM3 (ref 140–450)
PMV BLD AUTO: 10.8 FL (ref 6–12)
POTASSIUM SERPL-SCNC: 4.1 MMOL/L (ref 3.5–5.2)
PROT SERPL-MCNC: 6.8 G/DL (ref 6–8.5)
RBC # BLD AUTO: 4.35 10*6/MM3 (ref 4.14–5.8)
SODIUM SERPL-SCNC: 134 MMOL/L (ref 136–145)
WBC # BLD AUTO: 9.24 10*3/MM3 (ref 3.4–10.8)

## 2021-02-18 PROCEDURE — 63710000001 INSULIN ASPART PER 5 UNITS: Performed by: INTERNAL MEDICINE

## 2021-02-18 PROCEDURE — 85025 COMPLETE CBC W/AUTO DIFF WBC: CPT | Performed by: NURSE PRACTITIONER

## 2021-02-18 PROCEDURE — 82962 GLUCOSE BLOOD TEST: CPT

## 2021-02-18 PROCEDURE — 80053 COMPREHEN METABOLIC PANEL: CPT | Performed by: NURSE PRACTITIONER

## 2021-02-18 PROCEDURE — 25010000002 TIGECYCLINE: Performed by: INTERNAL MEDICINE

## 2021-02-18 PROCEDURE — 86140 C-REACTIVE PROTEIN: CPT | Performed by: INTERNAL MEDICINE

## 2021-02-18 PROCEDURE — 99239 HOSP IP/OBS DSCHRG MGMT >30: CPT | Performed by: NURSE PRACTITIONER

## 2021-02-18 PROCEDURE — 94799 UNLISTED PULMONARY SVC/PX: CPT

## 2021-02-18 PROCEDURE — 85007 BL SMEAR W/DIFF WBC COUNT: CPT | Performed by: NURSE PRACTITIONER

## 2021-02-18 RX ORDER — METHENAMINE HIPPURATE 1000 MG/1
1 TABLET ORAL 2 TIMES DAILY
Start: 2021-02-18 | End: 2021-06-03

## 2021-02-18 RX ORDER — SODIUM HYPOCHLORITE 1.25 MG/ML
5 SOLUTION TOPICAL 2 TIMES DAILY
Qty: 100 ML | Refills: 0 | Status: SHIPPED | OUTPATIENT
Start: 2021-02-18 | End: 2021-06-03

## 2021-02-18 RX ADMIN — INSULIN ASPART 8 UNITS: 100 INJECTION, SOLUTION INTRAVENOUS; SUBCUTANEOUS at 09:13

## 2021-02-18 RX ADMIN — BACLOFEN 30 MG: 10 TABLET ORAL at 09:13

## 2021-02-18 RX ADMIN — GABAPENTIN 400 MG: 400 CAPSULE ORAL at 05:56

## 2021-02-18 RX ADMIN — CHOLECALCIFEROL TAB 10 MCG (400 UNIT) 2000 UNITS: 10 TAB at 11:14

## 2021-02-18 RX ADMIN — APIXABAN 5 MG: 5 TABLET, FILM COATED ORAL at 09:14

## 2021-02-18 RX ADMIN — INSULIN ASPART 8 UNITS: 100 INJECTION, SOLUTION INTRAVENOUS; SUBCUTANEOUS at 12:23

## 2021-02-18 RX ADMIN — INSULIN ASPART 10 UNITS: 100 INJECTION, SOLUTION INTRAVENOUS; SUBCUTANEOUS at 12:23

## 2021-02-18 RX ADMIN — Medication: at 12:23

## 2021-02-18 RX ADMIN — SODIUM CHLORIDE 50 MG: 900 INJECTION INTRAVENOUS at 13:42

## 2021-02-18 RX ADMIN — SODIUM CHLORIDE, PRESERVATIVE FREE 10 ML: 5 INJECTION INTRAVENOUS at 09:11

## 2021-02-18 RX ADMIN — Medication 1 CAPSULE: at 09:13

## 2021-02-18 RX ADMIN — SODIUM CHLORIDE, PRESERVATIVE FREE 10 ML: 5 INJECTION INTRAVENOUS at 09:12

## 2021-02-18 RX ADMIN — Medication: at 09:12

## 2021-02-18 RX ADMIN — SODIUM HYPOCHLORITE: 1.25 SOLUTION TOPICAL at 12:23

## 2021-02-18 RX ADMIN — DULOXETINE HYDROCHLORIDE 60 MG: 60 CAPSULE, DELAYED RELEASE ORAL at 09:14

## 2021-02-18 RX ADMIN — SODIUM CHLORIDE 50 MG: 900 INJECTION INTRAVENOUS at 03:05

## 2021-02-18 RX ADMIN — PANTOPRAZOLE SODIUM 40 MG: 40 TABLET, DELAYED RELEASE ORAL at 09:13

## 2021-02-18 RX ADMIN — BACLOFEN 30 MG: 10 TABLET ORAL at 12:23

## 2021-02-18 NOTE — PLAN OF CARE
Goal Outcome Evaluation:        Outcome Summary: patient resting comfortably, no c/o pain/discomfort, wound care completed, no changes, will cotinue to monitor

## 2021-02-18 NOTE — DISCHARGE INSTR - APPOINTMENTS
Call Your PCP Franchesca Hodges and schedule a 1 week appointment with her. Phone number is [phone]    Follow up with your  Pulmonologist Dr Larios in [place]. Phone number is [phone]    Follow-up with wound care clinic in 1 week.  Office number [phone]. Ask for the Wound Care Clinic
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
## 2021-02-18 NOTE — NURSING NOTE
Educated the patient on how to properly pull up insulin.  Patient was able to demonstrate the process.

## 2021-02-18 NOTE — NURSING NOTE
Educated patients brother Pineda on the process of using the dial flow and supplies to give the patients home IV antibiotics.  Pineda verbalized the ability of understanding.
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
## 2021-02-18 NOTE — PROGRESS NOTES
PROGRESS NOTE         Patient Identification:  Name:  Ken Krueger  Age:  45 y.o.  Sex:  male  :  1975  MRN:  [id number]  Visit Number:  [id number]  Primary Care Provider:  Provider, No Known         LOS: 11 days       ----------------------------------------------------------------------------------------------------------------------  Subjective       Chief Complaints:    Vomiting, Nausea, and Diarrhea        Interval History:      Patient resting comfortably in bed.  Denies shortness of breath this morning.  Currently on 1 L nasal cannula with no apparent distress.  Afebrile, no diarrhea.  WBC normal.  CRP improving at 2.64.    Review of Systems:    Constitutional: no fever, chills and night sweats. No appetite change or unexpected weight change. No fatigue.  Eyes: no eye drainage, itching or redness.  HEENT: no mouth sores, dysphagia or nose bleed.  Respiratory: no for shortness of breath.  Productive cough.  Cardiovascular: no chest pain, no palpitations, no orthopnea.  Gastrointestinal: no nausea, vomiting or diarrhea. No abdominal pain, hematemesis or rectal bleeding.  Genitourinary: no dysuria or polyuria.  Hematologic/lymphatic: no lymph node abnormalities, no easy bruising or easy bleeding.  Musculoskeletal: no muscle or joint pain.  Skin: No rash and no itching.  Neurological: no loss of consciousness, no seizure, no headache.  Behavioral/Psych: no depression or suicidal ideation.  Endocrine: no hot flashes.  Immunologic: negative.    ----------------------------------------------------------------------------------------------------------------------      Objective       Current [facility] Meds:  apixaban, 5 mg, Oral, Q12H  ARIPiprazole, 10 mg, Oral, Nightly  atorvastatin, 10 mg, Oral, Nightly  baclofen, 30 mg, Oral, 4x Daily With Meals & Nightly  cholecalciferol, 2,000 Units, Oral, Daily  DULoxetine, 60 mg, Oral, BID  gabapentin, 400 mg, Oral, Q8H  insulin aspart, 0-14 Units,  Subcutaneous, TID AC  insulin aspart, 8 Units, Subcutaneous, TID With Meals  insulin detemir, 15 Units, Subcutaneous, Nightly  lactobacillus acidophilus, 1 capsule, Oral, Daily  magic barrier cream, , Topical, 4x Daily  pantoprazole, 40 mg, Oral, BID AC  sodium chloride, 10 mL, Intravenous, Q12H  sodium chloride, 10 mL, Intravenous, Q12H  sodium hypochlorite, , Topical, BID  tigecycline, 50 mg, Intravenous, Q12H         ----------------------------------------------------------------------------------------------------------------------    Vital Signs:  Temp:  [97.8 °F (36.6 °C)-98.3 °F (36.8 °C)] 97.8 °F (36.6 °C)  Heart Rate:  [78-99] 78  Resp:  [18] 18  BP: (106-141)/(62-81) 123/75  Mean Arterial Pressure (Non-Invasive) for the past 24 hrs (Last 3 readings):   Noninvasive MAP (mmHg)   02/18/21 1100 90     SpO2 Percentage    02/18/21 0820 02/18/21 0914 02/18/21 1100   SpO2: 99% 99% 97%     SpO2:  [95 %-99 %] 97 %  on  Flow (L/min):  [1] 1;   Device (Oxygen Therapy): room air    Body mass index is 38.74 kg/m².  Wt Readings from Last 3 Encounters:   02/15/21 126 kg (277 lb 12.5 oz)   06/02/20 (!) 150 kg (330 lb)   05/04/20 (!) 150 kg (330 lb)        Intake/Output Summary (Last 24 hours) at 2/18/2021 1241  Last data filed at 2/18/2021 0556  Gross per 24 hour   Intake 919.32 ml   Output 4100 ml   Net -3180.68 ml     Diet Regular; Consistent Carbohydrate  ----------------------------------------------------------------------------------------------------------------------      Physical Exam:    Constitutional:  Well-developed and well-nourished.  No respiratory distress.      HENT:  Head: Normocephalic and atraumatic.  Mouth:  Moist mucous membranes.    Eyes:  Conjunctivae and EOM are normal.  No scleral icterus.  Neck:  Neck supple.  No JVD present.    Cardiovascular:  Normal rate, regular rhythm and normal heart sounds with no murmur. No edema.  Pulmonary/Chest:  No respiratory distress, no wheezes, no crackles, with  normal breath sounds and good air movement.  Abdominal:  Soft.  Bowel sounds are normal. No tenderness or distention. Colostomy and urostomy.  Musculoskeletal: Left AKA, right leg atrophy.  Neurological:  Alert and oriented to person, place, and time.  No facial droop.  No slurred speech.   Skin:  Decubitus ulcers.  Psychiatric:  Normal mood and affect.  Behavior is normal.    ----------------------------------------------------------------------------------------------------------------------          Results from last 7 days   Lab Units 02/13/21  0905   PH, ARTERIAL pH units 7.423   PO2 ART mm Hg 86.6   PCO2, ARTERIAL mm Hg 44.7   HCO3 ART mmol/L 29.2*     Results from last 7 days   Lab Units 02/18/21  0605 02/18/21  0604 02/17/21  0944 02/16/21  0643   CRP mg/dL  --  2.64* 2.97* 2.79*   WBC 10*3/mm3 9.24  --  10.17 11.29*   HEMOGLOBIN g/dL 9.6*  --  10.2* 10.1*   HEMATOCRIT % 35.4*  --  35.9* 36.6*   MCV fL 81.4  --  79.8 80.1   MCHC g/dL 27.1*  --  28.4* 27.6*   PLATELETS 10*3/mm3 309  --  334 347     Results from last 7 days   Lab Units 02/18/21  0604 02/17/21  0944 02/16/21  0643 02/15/21  0429   SODIUM mmol/L 134* 134* 133* 135*   POTASSIUM mmol/L 4.1 3.9 4.2 4.0   CHLORIDE mmol/L 101 100 98 101   CO2 mmol/L 24.5 24.7 25.0 23.8   BUN mg/dL 16 18 20 21*   CREATININE mg/dL 0.72* 0.68* 0.83 0.68*   EGFR IF NONAFRICN AM mL/min/1.73 119 127 101 127   CALCIUM mg/dL 8.6 8.7 9.1 8.9   GLUCOSE mg/dL 352* 294* 375* 336*   ALBUMIN g/dL 2.81*  --  3.15* 2.89*   BILIRUBIN mg/dL <0.2  --  0.2 <0.2   ALK PHOS U/L 103  --  96 83   AST (SGOT) U/L 21  --  24 18   ALT (SGPT) U/L 25  --  25 25   Estimated Creatinine Clearance: 178.9 mL/min (A) (by C-G formula based on SCr of 0.72 mg/dL (L)).  No results found for: AMMONIA    Hemoglobin A1C   Date/Time Value Ref Range Status   02/17/2021 0944 10.80 (H) 4.80 - 5.60 % Final     Glucose   Date/Time Value Ref Range Status   02/18/2021 1035 335 (H) 70 - 130 mg/dL Final   02/18/2021  0727 277 (H) 70 - 130 mg/dL Final   02/17/2021 2216 393 (H) 70 - 130 mg/dL Final   02/17/2021 1635 352 (H) 70 - 130 mg/dL Final   02/17/2021 1107 255 (H) 70 - 130 mg/dL Final   02/17/2021 0643 226 (H) 70 - 130 mg/dL Final   02/16/2021 2158 172 (H) 70 - 130 mg/dL Final   02/16/2021 1618 229 (H) 70 - 130 mg/dL Final     Lab Results   Component Value Date    HGBA1C 10.80 (H) 02/17/2021     Lab Results   Component Value Date    TSH 1.160 02/07/2021       Blood Culture   Date Value Ref Range Status   02/07/2021 No growth at 3 days  Preliminary   02/07/2021 No growth at 3 days  Preliminary     Urine Culture   Date Value Ref Range Status   02/07/2021 (A)  Final    >100,000 CFU/mL Klebsiella pneumoniae ssp pneumoniae     No results found for: WOUNDCX  No results found for: STOOLCX  No results found for: RESPCX  Pain Management Panel     Pain Management Panel Latest Ref Rng & Units 8/19/2018 12/5/2017    AMPHETAMINES SCREEN, URINE Negative Negative Negative    BARBITURATES SCREEN Negative Negative Negative    BENZODIAZEPINE SCREEN, URINE Negative Negative Negative    BUPRENORPHINEUR Negative Negative Negative    COCAINE SCREEN, URINE Negative Negative Negative    METHADONE SCREEN, URINE Negative Negative Negative            ----------------------------------------------------------------------------------------------------------------------  Imaging Results (Last 24 Hours)     ** No results found for the last 24 hours. **          ----------------------------------------------------------------------------------------------------------------------    Assessment/Plan       Assessment/Plan     ASSESSMENT:    1.  Severe sepsis with lactic acid greater than 2 on admission  2.  UTI  3.  Osteomyelitis    PLAN:    Patient resting comfortably in bed.  Denies shortness of breath this morning.  Currently on 1 L nasal cannula with no apparent distress.  Afebrile, no diarrhea.  WBC normal.  CRP improving at 2.64.    Wound culture on  2/11/2021 finalized as scant growth of MDRO Acinetobacter and rare staph aureus.    Chest x-ray on 2/13/2021 showed no acute disease.  X-ray of the pelvis on 2/12/2021 showed chronic deformity in the right hip and no convincing evidence of an acute fracture.  X-ray of the right femur on 2/12/2021 showed chronic changes but no acute bony abnormality.  MRI of pelvis with and without contrast on 2/11/2021 showed old fracture of the proximal right femur.  There is surrounding fluid and minimal increased signal in the proximal right femur.  This would support a diagnosis of osteomyelitis.  Minimal fluid in left hip as well.      COVID-19 PCR negative.  Blood culture finalized as no growth.  GI panel negative.  Urinalysis positive with urine culture finalizing as Klebsiella pneumoniae. CT of the abdomen and pelvis from 2/7/2021 reports multiple dilated loops of small bowel that may represent potential small bowel obstruction versus ileus.    As the wound culture finalized as MDRO Acinetobacter baumannii and MSSA, recommend to continue on Tygacil 50 mg IV q 12 hours through 3/14/2021.  Patient stable from ID standpoint.    Code Status:   Code Status and Medical Interventions:   Ordered at: 02/07/21 1753     Code Status:    CPR     Medical Interventions (Level of Support Prior to Arrest):    Full       Verenice Deluca, GUANACO  02/18/21  12:41 EST

## 2021-02-18 NOTE — PROGRESS NOTES
Discharge Planning Assessment  [facility]     Patient Name: Ken Krueger  MRN: [id number]  Today's Date: 2/18/2021    Admit Date: 2/7/2021    Discharge Plan     Row Name 02/18/21 1529       Plan    Final Discharge Disposition Code  06 - home with home health care    Final Note Pt is being discharged home today. SS contacted [facility] per To who states IV Tygacil cannot be delivered until Friday, 2/19/21 due to the weather. SS faxed final order for IV Tygacil to [facility]. NP, Rosemarie contacted [facility] and they are agreeable to send two doses of IV Tygacil home with pt for Friday, 2/19/21. RN, Susie aware and will send IV antibiotic supplies home with pt. Pt's brother has assisted pt in the past with home IV antibiotics and is agreeable to assist again. Pt utilizes [facility]. SS contacted [facility] [phone] per Doris Chambers and faxed clinicals including order and AVS. HH notified about wound vac and wound vac dressing changes. RN to call report to [facility]. No other needs identified.    Pt did not qualify for home oxygen. No other needs identified.        Continued Care and Services - Admitted Since 2/7/2021     Home Medical Care Coordination complete    Service Provider Request Status Selected Services Address Phone Fax Patient Preferred    [facility]   [facility] [address] [place] [address] [phone] --              BRENT Burns

## 2021-02-18 NOTE — DISCHARGE PLACEMENT REQUEST
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        Or   • potassium phosphate 15 mmol in sodium chloride 0.9 % 100 mL infusion  15 mmol Intravenous PRN DanisJavier steele A, DO        Or   • sodium phosphates 45 mmol in sodium chloride 0.9 % 500 mL IVPB  45 mmol Intravenous PRN DanisJavier steele A, DO        Or   • sodium phosphates 30 mmol in sodium chloride 0.9 % 250 mL IVPB  30 mmol Intravenous PRN DanisJavier A, DO        Or   • sodium phosphates 15 mmol in sodium chloride 0.9 % 250 mL IVPB  15 mmol Intravenous PRN DanisJavier steele A, DO       • sodium chloride 0.9 % flush 10 mL  10 mL Intravenous PRN DanisJavier A, DO       • sodium chloride 0.9 % flush 10 mL  10 mL Intravenous Q12H DanisJavier steele, DO   10 mL at 02/18/21 0912   • sodium chloride 0.9 % flush 10 mL  10 mL Intravenous PRN  Danis, Dexopher A, DO       • sodium chloride 0.9 % flush 10 mL  10 mL Intravenous Q12H Danis, Dexopher A, DO   10 mL at 02/18/21 0911   • sodium chloride 0.9 % flush 10 mL  10 mL Intravenous PRN Danis, Dexopher A, DO       • sodium chloride 0.9 % flush 20 mL  20 mL Intravenous PRN Danis, Dexopher A, DO       • sodium hypochlorite (DAKIN'S 1/4 STRENGTH) 0.125 % topical solution 0.125% solution   Topical BID Danis, Dexopher A, DO   Given at 02/18/21 1223   • tigecycline (TYGACIL) 50 mg/100 mL 0.9% NS IVPB (mbp)  50 mg Intravenous Q12H Danis, Dexopher A, DO   50 mg at 02/18/21 1342     Lab Results (most recent)     Procedure Component Value Units Date/Time    POC Glucose Once [[id number]]  (Abnormal) Collected: 02/18/21 1035    Specimen: Blood Updated: 02/18/21 1040     Glucose 335 mg/dL     CBC & Differential [[id number]]  (Abnormal) Collected: 02/18/21 0605    Specimen: Blood Updated: 02/18/21 0832    Narrative:      The following orders were created for panel order CBC & Differential.  Procedure                               Abnormality         Status                     ---------                               -----------         ------                     Scan Slide[[id number]]                                       Final result               CBC Auto Differential[[id number]]        Abnormal            Final result                 Please view results for these tests on the individual orders.    Scan Slide [[id number]] Collected: 02/18/21 0605    Specimen: Blood Updated: 02/18/21 0832     Anisocytosis Slight/1+     Hypochromia Mod/2+     Platelet Morphology Normal    POC Glucose Once [[id number]]  (Abnormal) Collected: 02/18/21 0727    Specimen: Blood Updated: 02/18/21 0742     Glucose 277 mg/dL     C-reactive Protein [[id number]]  (Abnormal) Collected: 02/18/21 0604    Specimen: Blood Updated: 02/18/21 0723     C-Reactive Protein 2.64 mg/dL     Comprehensive Metabolic Panel [[id number]]   (Abnormal) Collected: 02/18/21 0604    Specimen: Blood Updated: 02/18/21 0723     Glucose 352 mg/dL      BUN 16 mg/dL      Creatinine 0.72 mg/dL      Sodium 134 mmol/L      Potassium 4.1 mmol/L      Chloride 101 mmol/L      CO2 24.5 mmol/L      Calcium 8.6 mg/dL      Total Protein 6.8 g/dL      Albumin 2.81 g/dL      ALT (SGPT) 25 U/L      AST (SGOT) 21 U/L      Alkaline Phosphatase 103 U/L      Total Bilirubin <0.2 mg/dL      eGFR Non African Amer 119 mL/min/1.73      Globulin 4.0 gm/dL      A/G Ratio 0.7 g/dL      BUN/Creatinine Ratio 22.2     Anion Gap 8.5 mmol/L     Narrative:      GFR Normal >60  Chronic Kidney Disease <60  Kidney Failure <15      CBC Auto Differential [[id number]]  (Abnormal) Collected: 02/18/21 0605    Specimen: Blood Updated: 02/18/21 0702     WBC 9.24 10*3/mm3      RBC 4.35 10*6/mm3      Hemoglobin 9.6 g/dL      Hematocrit 35.4 %      MCV 81.4 fL      MCH 22.1 pg      MCHC 27.1 g/dL      RDW 16.1 %      RDW-SD 47.9 fl      MPV 10.8 fL      Platelets 309 10*3/mm3      Neutrophil % 66.1 %      Lymphocyte % 28.2 %      Monocyte % 3.2 %      Eosinophil % 1.7 %      Basophil % 0.4 %      Immature Grans % 0.4 %      Neutrophils, Absolute 6.09 10*3/mm3      Lymphocytes, Absolute 2.61 10*3/mm3      Monocytes, Absolute 0.30 10*3/mm3      Eosinophils, Absolute 0.16 10*3/mm3      Basophils, Absolute 0.04 10*3/mm3      Immature Grans, Absolute 0.04 10*3/mm3      nRBC 0.0 /100 WBC     Hemoglobin A1c [[id number]]  (Abnormal) Collected: 02/17/21 0944    Specimen: Blood Updated: 02/17/21 1044     Hemoglobin A1C 10.80 %     Narrative:      Hemoglobin A1C Ranges:    Increased Risk for Diabetes  5.7% to 6.4%  Diabetes                     >= 6.5%  Diabetic Goal                < 7.0%    C-reactive Protein [[id number]]  (Abnormal) Collected: 02/17/21 0944    Specimen: Blood Updated: 02/17/21 1031     C-Reactive Protein 2.97 mg/dL     Basic Metabolic Panel [[id number]]  (Abnormal) Collected: 02/17/21 0940     Specimen: Blood Updated: 02/17/21 1031     Glucose 294 mg/dL      BUN 18 mg/dL      Creatinine 0.68 mg/dL      Sodium 134 mmol/L      Potassium 3.9 mmol/L      Chloride 100 mmol/L      CO2 24.7 mmol/L      Calcium 8.7 mg/dL      eGFR Non African Amer 127 mL/min/1.73      BUN/Creatinine Ratio 26.5     Anion Gap 9.3 mmol/L     Narrative:      GFR Normal >60  Chronic Kidney Disease <60  Kidney Failure <15      CBC & Differential [[id number]]  (Abnormal) Collected: 02/17/21 0944    Specimen: Blood Updated: 02/17/21 1012    Narrative:      The following orders were created for panel order CBC & Differential.  Procedure                               Abnormality         Status                     ---------                               -----------         ------                     Scan Slide[[id number]]                                       Final result               CBC Auto Differential[[id number]]        Abnormal            Final result                 Please view results for these tests on the individual orders.    CBC Auto Differential [[id number]]  (Abnormal) Collected: 02/17/21 0944    Specimen: Blood Updated: 02/17/21 1012     WBC 10.17 10*3/mm3      RBC 4.50 10*6/mm3      Hemoglobin 10.2 g/dL      Hematocrit 35.9 %      MCV 79.8 fL      MCH 22.7 pg      MCHC 28.4 g/dL      RDW 16.0 %      RDW-SD 45.6 fl      MPV 10.6 fL      Platelets 334 10*3/mm3      Neutrophil % 69.4 %      Lymphocyte % 25.4 %      Monocyte % 3.0 %      Eosinophil % 1.5 %      Basophil % 0.4 %      Immature Grans % 0.3 %      Neutrophils, Absolute 7.06 10*3/mm3      Lymphocytes, Absolute 2.58 10*3/mm3      Monocytes, Absolute 0.31 10*3/mm3      Eosinophils, Absolute 0.15 10*3/mm3      Basophils, Absolute 0.04 10*3/mm3      Immature Grans, Absolute 0.03 10*3/mm3      nRBC 0.0 /100 WBC     Scan Slide [[id number]] Collected: 02/17/21 0944    Specimen: Blood Updated: 02/17/21 1012     Anisocytosis Slight/1+     Hypochromia Large/3+     Microcytes  Slight/1+     Platelet Morphology Normal    Comprehensive Metabolic Panel [[id number]]  (Abnormal) Collected: 02/16/21 0643    Specimen: Blood Updated: 02/16/21 0829     Glucose 375 mg/dL      BUN 20 mg/dL      Creatinine 0.83 mg/dL      Sodium 133 mmol/L      Potassium 4.2 mmol/L      Chloride 98 mmol/L      CO2 25.0 mmol/L      Calcium 9.1 mg/dL      Total Protein 6.9 g/dL      Albumin 3.15 g/dL      ALT (SGPT) 25 U/L      AST (SGOT) 24 U/L      Alkaline Phosphatase 96 U/L      Total Bilirubin 0.2 mg/dL      eGFR Non African Amer 101 mL/min/1.73      Globulin 3.8 gm/dL      A/G Ratio 0.8 g/dL      BUN/Creatinine Ratio 24.1     Anion Gap 10.0 mmol/L     Narrative:      GFR Normal >60  Chronic Kidney Disease <60  Kidney Failure <15      Wound Culture - Wound, Buttock [[id number]]  (Abnormal)  (Susceptibility) Collected: 02/11/21 1045    Specimen: Wound from Buttock Updated: 02/15/21 0844     Wound Culture Scant growth (1+) Acinetobacter baumannii MDRO     Comment: Multi drug resistant Acinetobacter, patient may be an isolation risk.  Sent to [facility] for Colistin and Polymyxin B    Multi drug resistant Acinetobacter, patient may be an isolation risk.         Rare Staphylococcus aureus     Gram Stain No WBCs or organisms seen    Narrative:      SENT to [facility] for Colistin and Polymyxin B    Susceptibility      Acinetobacter baumannii MDRO     ROGER Not Specified     Ampicillin + Sulbactam Intermediate      Cefepime Resistant      Ceftazidime Resistant      Ciprofloxacin Resistant      Eravacycline  Resistant     Gentamicin Resistant      Levofloxacin Resistant      Meropenem Resistant      Piperacillin + Tazobactam Resistant      Tetracycline Resistant      Tigecycline  No CLSI Guidelines     Tobramycin Resistant                   Susceptibility      Staphylococcus aureus     ROGER     Clindamycin Susceptible     Erythromycin Resistant     Inducible Clindamycin Resistance Negative     Oxacillin Susceptible     Penicillin G  Resistant     Rifampin Susceptible     Tetracycline Susceptible     Trimethoprim + Sulfamethoxazole Susceptible     Vancomycin Susceptible                Susceptibility Comments     Acinetobacter baumannii MDRO    For MDR Acinetobacter infections, susceptibility results may not correlate to clinical outcomes. Please consider infectious disease consult.      Staphylococcus aureus    This isolate does not demonstrate inducible clindamycin resistance in vitro.               Vancomycin, Trough [[id number]]  (Normal) Collected: 02/13/21 1058    Specimen: Blood Updated: 02/13/21 1226     Vancomycin Trough 7.90 mcg/mL     Narrative:      Therapeutic Ranges for Vancomycin    Vancomycin Random   5.0-40.0 mcg/mL  Vancomycin Trough   5.0-20.0 mcg.mL  Vancomycin Peak     20.0-40.0 mcg/mL    Blood Gas, Arterial With Co-Ox [[id number]]  (Abnormal) Collected: 02/13/21 0905    Specimen: Arterial Blood Updated: 02/13/21 0909     Site Right Radial     Kalpesh's Test Positive     pH, Arterial 7.423 pH units      pCO2, Arterial 44.7 mm Hg      pO2, Arterial 86.6 mm Hg      HCO3, Arterial 29.2 mmol/L      Comment: 83 Value above reference range        Base Excess, Arterial 4.1 mmol/L      O2 Saturation, Arterial 96.9 %      Hemoglobin, Blood Gas 11.3 g/dL      Comment: 84 Value below reference range        Hematocrit, Blood Gas 34.7 %      Comment: 84 Value below reference range        Oxyhemoglobin 95.3 %      Methemoglobin <1.00 %      Comment: 94 Value below reportable range < 1.0        Carboxyhemoglobin 1.2 %      A-a Gradiant 54.2 mmHg      CO2 Content 30.5 mmol/L      Temperature 0.0 C      Barometric Pressure for Blood Gas 729 mmHg      Modality Nasal Cannula     FIO2 28 %      Flow Rate 2.0 lpm      Ventilator Mode NA     Note --     Notified Who Dr Adrian     Notified By [id number]     Collected by [id number]     Comment: Meter: [id number]     :  [id number]        pH, Temp Corrected --     pCO2, Temperature Corrected --      pO2, Temperature Corrected --    Basic Metabolic Panel [[id number]]  (Abnormal) Collected: 02/13/21 0210    Specimen: Blood Updated: 02/13/21 0243     Glucose 281 mg/dL      BUN 15 mg/dL      Creatinine 0.67 mg/dL      Sodium 138 mmol/L      Potassium 4.0 mmol/L      Chloride 102 mmol/L      CO2 25.7 mmol/L      Calcium 9.5 mg/dL      eGFR Non African Amer 129 mL/min/1.73      BUN/Creatinine Ratio 22.4     Anion Gap 10.3 mmol/L     Narrative:      GFR Normal >60  Chronic Kidney Disease <60  Kidney Failure <15      Blood Culture - Blood, Arm, Right [[id number]] Collected: 02/07/21 1356    Specimen: Blood from Arm, Right Updated: 02/12/21 1415     Blood Culture No growth at 5 days    Blood Culture - Blood, Arm, Left [[id number]] Collected: 02/07/21 1356    Specimen: Blood from Arm, Left Updated: 02/12/21 1415     Blood Culture No growth at 5 days    Potassium [[id number]]  (Normal) Collected: 02/10/21 1720    Specimen: Blood Updated: 02/10/21 1749     Potassium 4.0 mmol/L     Potassium [[id number]]  (Normal) Collected: 02/09/21 1711    Specimen: Blood Updated: 02/09/21 1730     Potassium 3.9 mmol/L     Urine Culture - Urine, Urine, Catheter [[id number]]  (Abnormal)  (Susceptibility) Collected: 02/07/21 1826    Specimen: Urine, Catheter Updated: 02/09/21 0259     Urine Culture >100,000 CFU/mL Klebsiella pneumoniae ssp pneumoniae    Susceptibility      Klebsiella pneumoniae ssp pneumoniae     ROGER     Ampicillin Intermediate     Ampicillin + Sulbactam Susceptible     Cefazolin Susceptible     Cefepime Susceptible     Ceftazidime Susceptible     Ceftriaxone Susceptible     Gentamicin Susceptible     Levofloxacin Intermediate     Nitrofurantoin Intermediate     Piperacillin + Tazobactam Susceptible     Tetracycline Resistant     Trimethoprim + Sulfamethoxazole Resistant                    Magnesium [[id number]]  (Normal) Collected: 02/08/21 0902    Specimen: Blood Updated: 02/08/21 1003     Magnesium 2.1 mg/dL     Phosphorus  [[id number]]  (Normal) Collected: 02/08/21 0902    Specimen: Blood Updated: 02/08/21 0956     Phosphorus 3.0 mg/dL     Lactic Acid, Plasma [[id number]]  (Normal) Collected: 02/08/21 0903    Specimen: Blood Updated: 02/08/21 0952     Lactate 1.7 mmol/L     Gastrointestinal Panel, PCR - Stool, Per Rectum [[id number]]  (Normal) Collected: 02/07/21 2100    Specimen: Stool from Per Rectum Updated: 02/07/21 2220     Campylobacter Not Detected     Plesiomonas shigelloides Not Detected     Salmonella Not Detected     Vibrio Not Detected     Vibrio cholerae Not Detected     Yersinia enterocolitica Not Detected     Enteroaggregative E. coli (EAEC) Not Detected     Enteropathogenic E. coli (EPEC) Not Detected     Enterotoxigenic E. coli (ETEC) lt/st Not Detected     Shiga-like toxin-producing E. coli (STEC) stx1/stx2 Not Detected     Shigella/Enteroinvasive E. coli (EIEC) Not Detected     Cryptosporidium Not Detected     Cyclospora cayetanensis Not Detected     Entamoeba histolytica Not Detected     Giardia lamblia Not Detected     Adenovirus F40/41 Not Detected     Astrovirus Not Detected     Norovirus GI/GII Not Detected     Rotavirus A Not Detected     Sapovirus (I, II, IV or V) Not Detected    Lactic Acid, Reflex [[id number]]  (Abnormal) Collected: 02/07/21 1952    Specimen: Blood from Hand, Left Updated: 02/07/21 2016     Lactate 2.1 mmol/L     Urinalysis, Microscopic Only - Urine, Catheter [[id number]]  (Abnormal) Collected: 02/07/21 1826    Specimen: Urine, Catheter Updated: 02/07/21 1853     RBC, UA 0-2 /HPF      WBC, UA 21-30 /HPF      Bacteria, UA 1+ /HPF      Squamous Epithelial Cells, UA 3-6 /HPF      Hyaline Casts, UA None Seen /LPF      Amorphous Crystals, UA Small/1+ /HPF      Mucus, UA Small/1+ /HPF      Methodology Automated Microscopy    Urinalysis With Culture If Indicated - Urine, Catheter [[id number]]  (Abnormal) Collected: 02/07/21 1826    Specimen: Urine, Catheter Updated: 02/07/21 1852     Color, UA Dark  Yellow     Appearance, UA Cloudy     pH, UA 7.5     Specific Gravity, UA 1.023     Glucose, UA Negative     Ketones, UA Trace     Bilirubin, UA Small (1+)     Blood, UA Negative     Protein,  mg/dL (2+)     Leuk Esterase, UA Large (3+)     Nitrite, UA Negative     Urobilinogen, UA 1.0 E.U./dL    Lactic Acid, Reflex Timer (This will reflex a repeat order 3-3:15 hours after ordered.) [[id number]] Collected: 02/07/21 1356    Specimen: Blood Updated: 02/07/21 1815     Hold Tube Hold for add-ons.     Comment: Auto resulted.       Troponin [[id number]]  (Normal) Collected: 02/07/21 1356    Specimen: Blood Updated: 02/07/21 1551     Troponin T 0.029 ng/mL     Narrative:      Troponin T Reference Range:  <= 0.03 ng/mL-   Negative for AMI  >0.03 ng/mL-     Abnormal for myocardial necrosis.  Clinicians would have to utilize clinical acumen, EKG, Troponin and serial changes to determine if it is an Acute Myocardial Infarction or myocardial injury due to an underlying chronic condition.       Results may be falsely decreased if patient taking Biotin.      Lactic Acid, Plasma [[id number]]  (Abnormal) Collected: 02/07/21 1356    Specimen: Blood Updated: 02/07/21 1514     Lactate 3.4 mmol/L     TSH [[id number]]  (Normal) Collected: 02/07/21 1356    Specimen: Blood Updated: 02/07/21 1503     TSH 1.160 uIU/mL     [place] Draw [[id number]] Collected: 02/07/21 1356    Specimen: Blood Updated: 02/07/21 1500    Narrative:      The following orders were created for panel order [place] Draw.  Procedure                               Abnormality         Status                     ---------                               -----------         ------                     Light Blue Top[[id number]]                                   Final result               Green Top (Gel)[[id number]]                                  Final result               Lavender Top[[id number]]                                     Final result               Gold Top -  SST[[id number]]                                   Final result                 Please view results for these tests on the individual orders.    Light Blue Top [[id number]] Collected: 02/07/21 1356    Specimen: Blood Updated: 02/07/21 1500     Extra Tube hold for add-on     Comment: Auto resulted       Green Top (Gel) [[id number]] Collected: 02/07/21 1356    Specimen: Blood Updated: 02/07/21 1500     Extra Tube Hold for add-ons.     Comment: Auto resulted.       Lavender Top [[id number]] Collected: 02/07/21 1356    Specimen: Blood Updated: 02/07/21 1500     Extra Tube hold for add-on     Comment: Auto resulted       Gold Top - SST [[id number]] Collected: 02/07/21 1356    Specimen: Blood Updated: 02/07/21 1500     Extra Tube Hold for add-ons.     Comment: Auto resulted.       Lipase [[id number]]  (Normal) Collected: 02/07/21 1356    Specimen: Blood Updated: 02/07/21 1458     Lipase 39 U/L     Magnesium [[id number]]  (Normal) Collected: 02/07/21 1356    Specimen: Blood Updated: 02/07/21 1458     Magnesium 1.6 mg/dL     COVID-19 and FLU A/B PCR - Swab, Nasopharynx [[id number]]  (Normal) Collected: 02/07/21 1413    Specimen: Swab from Nasopharynx Updated: 02/07/21 1440     COVID19 Not Detected     Influenza A PCR Not Detected     Influenza B PCR Not Detected    Narrative:      Fact sheet for providers: https://www.fda.gov/media/543498/download    Fact sheet for patients: https://www.fda.gov/media/118325/download    Test performed by PCR.    Sedimentation Rate [[id number]]  (Normal) Collected: 02/07/21 1356    Specimen: Blood Updated: 02/07/21 1422     Sed Rate 7 mm/hr           Imaging Results (Most Recent)     Procedure Component Value Units Date/Time    XR Chest 1 View [[id number]] Collected: 02/13/21 1046     Updated: 02/13/21 1048    Narrative:      PROCEDURE: CR Chest 1 Vw    COMPARISON:  2/7/2021     INDICATIONS: hypoxia; Sepsis, unspecified organism; Nausea with vomiting, unspecified; Diarrhea, unspecified      TECHNIQUE:  Single AP  view of the chest    FINDINGS:  Cardiomediastinal silhouette is stable and lungs are clear. Hardware seen in the cervicothoracic spine extending to the mid thoracic spine. Osseous structures are intact.      Impression:      No acute disease.         Signer Name: Aylin Doe MD   Signed: 2/13/2021 10:46 AM   Workstation Name: [id number]    [facility]    XR Femur 2 View Right [[id number]] Collected: 02/12/21 1528     Updated: 02/12/21 1626    Narrative:      EXAMINATION: XR FEMUR 2 VIEW RIGHT-      CLINICAL INDICATION: MRI results; AP and lateral R femur; A41.9-Sepsis,  unspecified organism; R11.2-Nausea with vomiting, unspecified;  R19.7-Diarrhea, unspecified        COMPARISON: None available.     FINDINGS: Four views of the right femur show chronic deformity of the  right femoral head. It is superior in relation to the acetabulum.     No evidence of an acute fracture.     Chronic deformity in the distal right femur is noted.       Impression:      Chronic changes, but no acute bony abnormality.         This report was finalized on 2/12/2021 4:23 PM by Dr. Hernesto Alaniz MD.       XR Pelvis 1 or 2 View [[id number]] Collected: 02/12/21 1536     Updated: 02/12/21 1540    Narrative:      EXAMINATION: XR PELVIS 1 OR 2 VW-      CLINICAL INDICATION: AP pelvis; A41.9-Sepsis, unspecified organism;  R11.2-Nausea with vomiting, unspecified; R19.7-Diarrhea, unspecified        COMPARISON: None available     FINDINGS: One view of the pelvis shows chronic deformity in the right  hip     No convincing evidence of an acute fracture      This report was finalized on 2/12/2021 3:38 PM by Dr. Hernesto Alaniz MD.       MRI Pelvis With & Without Contrast [[id number]] Collected: 02/11/21 1322     Updated: 02/11/21 1331    Narrative:      EXAMINATION: MRI PELVIS W WO CONTRAST-      CLINICAL INDICATION: Osteomyelitis suspected, pelvis, xray done;  A41.9-Sepsis, unspecified organism; R11.2-Nausea with  vomiting,  unspecified; R19.7-Diarrhea, unspecified        COMPARISON: 07/31/2017 pelvic MRI.     PROCEDURE: Multiple planar images of the pelvis were acquired in a high  field strength magnet. Several pulse sequences were used to acquire the  study. Images were acquired before and after gadolinium administration.     FINDINGS:     There is evidence of proximal right femoral fracture. There is an  effusion. Also there is minimal increased signal in the proximal right  femur. This would support a diagnosis of osteomyelitis. It does have a  very similar appearance to the prior study.     Trace fluid is seen in the left hip.       Impression:      1. Old fracture of the proximal right femur. There is surrounding fluid  and minimal increased signal in the proximal right femur. This would  support a diagnosis of osteomyelitis.  2. Minimal fluid in the left hip as well.      This report was finalized on 2/11/2021 1:29 PM by Dr. Hernesto Alaniz MD.       CT Abdomen Pelvis Without Contrast [[id number]] Collected: 02/07/21 1712     Updated: 02/07/21 1714    Narrative:      CT Abdomen Pelvis WO    INDICATION:   Abdominal pain, nausea, vomiting    TECHNIQUE:   CT of the abdomen and pelvis without IV contrast. Coronal and sagittal reconstructions were obtained.  Radiation dose reduction techniques included automated exposure control or exposure modulation based on body size. Count of known CT and cardiac nuc  med studies performed in previous 12 months: 0.     COMPARISON:   CT of the abdomen and pelvis from 10/19/2019    FINDINGS:  Abdomen: Probable small focus of atelectasis is seen at the left lung base. Prior cholecystectomy. There is hepatic steatosis. There is a nonobstructing left renal stone. Spleen is normal.    Pelvis: Multiple dilated loops of bowel are seen measuring up to 3.5 cm.. No significant free fluid There is no definite transition point. Osseous structures again demonstrate deformity involving the right femur       Impression:      Multiple dilated loops of small bowel are identified that may represent a potential small bowel obstruction versus ileus. No definite transition point is identified.      Signer Name: Fransisca Posey MD   Signed: 2021 5:12 PM   Workstation Name: [id number]    [facility]    XR Abdomen 2+ VW with Chest 1 VW [[id number]] Collected: 21 1455     Updated: 21 1501    Narrative:      EXAM:    XR Abdomen, 2 Views and XR Chest, 1 View     EXAM DATE:    2021 1:33 PM     CLINICAL HISTORY:    vomiting weakness     TECHNIQUE:    Frontal view of the chest, frontal view of the abdomen/pelvis and  upright or decubitus view of the abdomen.     COMPARISON:    2019     FINDINGS:    LUNGS:  Unremarkable.  No consolidation.    PLEURAL SPACE:  Unremarkable.  No pneumothorax.    HEART:  Unremarkable.  No cardiomegaly.    MEDIASTINUM:  Unremarkable.    INTRAPERITONEAL SPACE:  No free air.    GASTROINTESTINAL TRACT:  Unremarkable.  No dilation.    BONES/JOINTS:  Unremarkable.       Impression:        Unremarkable chest, abdomen and pelvis x-rays.     This report was finalized on 2021 2:55 PM by Dr. Yoshi Hooper MD.           Operative/Procedure Notes (most recent note)    No notes of this type exist for this encounter.            Physician Progress Notes (most recent note)      Verenice Deluca APRN at 21 1241                     PROGRESS NOTE         Patient Identification:  Name:  Ken Krueger  Age:  45 y.o.  Sex:  male  :  1975  MRN:  [id number]  Visit Number:  [id number]  Primary Care Provider:  Provider, No Known         LOS: 11 days       ----------------------------------------------------------------------------------------------------------------------  Subjective       Chief Complaints:    Vomiting, Nausea, and Diarrhea        Interval History:      Patient resting comfortably in bed.  Denies shortness of breath this morning.  Currently on 1 L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

## 2021-02-19 ENCOUNTER — READMISSION MANAGEMENT (OUTPATIENT)
Dept: CALL CENTER | Facility: HOSPITAL | Age: 46
End: 2021-02-19

## 2021-02-19 NOTE — OUTREACH NOTE
Prep Survey      Responses   [facility]?  [facility]   Is LACE score < 7 ?  No   Emergency Room discharge w/ pulse ox?  No   Eligibility  Readm Mgmt   Discharge diagnosis  Septic shock 2/2 UTI   Does the patient have one of the following disease processes/diagnoses(primary or secondary)?  Sepsis   Does the patient have Home health ordered?  Yes   What is the Home health agency?   [facility]    Is there a DME ordered?  No   Prep survey completed?  Yes          Yamila Núñez RN

## 2021-02-23 ENCOUNTER — READMISSION MANAGEMENT (OUTPATIENT)
Dept: CALL CENTER | Facility: HOSPITAL | Age: 46
End: 2021-02-23

## 2021-02-23 NOTE — OUTREACH NOTE
"Sepsis Week 1 Survey      Responses   [facility] patient discharged from?  [facility]   Does the patient have one of the following disease processes/diagnoses(primary or secondary)?  Sepsis   Week 1 attempt successful?  Yes   Call start time  0908   Call end time  0911   Discharge diagnosis  Septic shock 2/2 UTI   Meds reviewed with patient/caregiver?  Yes   Is the patient having any side effects they believe may be caused by any medication additions or changes?  Yes   Side effects comments   \"I get a little sick from my IV\"   Does the patient have all medications related to this admission filled (includes all antibiotics, inhalers, nebulizers,steroids,etc.)  Yes   Is the patient taking all medications as directed (includes completed medication regime)?  Yes   Does the patient have a primary care provider?   Yes   Comments regarding PCP  PCP is Franchesca Hodges and was updated in Epic.    Does the patient have an appointment with their PCP within 7 days of discharge?  No   What is preventing the patient from scheduling follow up appointments within 7 days of discharge?  Waiting on return call   Nursing Interventions  Advised patient to make appointment   Has the patient kept scheduled appointments due by today?  N/A   What is the Home health agency?   [facility]    Has home health visited the patient within 72 hours of discharge?  Unsure   Psychosocial issues?  No   Did the patient receive a copy of their discharge instructions?  Yes   Nursing interventions  Reviewed instructions with patient   What is the patient's perception of their health status since discharge?  Improving   Nursing interventions  Nurse provided patient education   Is the patient/caregiver able to teach back Sepsis?  S - Shivering,fever or very cold, S - Sleepy, difficult to arouse,confused, S - Short of breath   Nursing interventions  Nurse provided patient education   Is patient/caregiver able to teach back steps to " recovery at home?  Rest and regain strength, Set small, achievable goals for return to baseline health   Is the patient/caregiver able to teach back signs and symptoms of worsening condition:  Fever, Hyperthermia, Shortness of breath/rapid respiratory rate, Altered mental status(confusion/coma)   If the patient is a current smoker, are they able to teach back resources for cessation?  Not a smoker   Is the patient/caregiver able to teach back the hierarchy of who to call/visit for symptoms/problems? PCP, Specialist, Home health nurse, Urgent Care, ED, 911  Yes   Week 1 call completed?  Yes          Sally Randall RN

## 2021-02-24 ENCOUNTER — HOSPITAL ENCOUNTER (OUTPATIENT)
Dept: WOUND CARE | Facility: HOSPITAL | Age: 46
End: 2021-02-24

## 2021-03-02 ENCOUNTER — READMISSION MANAGEMENT (OUTPATIENT)
Dept: CALL CENTER | Facility: HOSPITAL | Age: 46
End: 2021-03-02

## 2021-03-02 NOTE — OUTREACH NOTE
Sepsis Week 2 Survey      Responses   [facility] patient discharged from?  [facility]   Does the patient have one of the following disease processes/diagnoses(primary or secondary)?  Sepsis   Week 2 attempt successful?  Yes   Call start time  1640   Call end time  1643   Discharge diagnosis  Septic shock 2/2 UTI   Meds reviewed with patient/caregiver?  Yes   Is the patient having any side effects they believe may be caused by any medication additions or changes?  Yes   Side effects comments   Nausea has improved with eating before.   Does the patient have all medications related to this admission filled (includes all antibiotics, inhalers, nebulizers,steroids,etc.)  Yes   Is the patient taking all medications as directed (includes completed medication regime)?  Yes   Does the patient have a primary care provider?   Yes   Does the patient have an appointment with their PCP within 7 days of discharge?  Yes   Has the patient kept scheduled appointments due by today?  Yes   What is the Home health agency?   [facility]    Has home health visited the patient within 72 hours of discharge?  Yes   Psychosocial issues?  No   Did the patient receive a copy of their discharge instructions?  Yes   Nursing interventions  Reviewed instructions with patient   What is the patient's perception of their health status since discharge?  Improving   Nursing interventions  Nurse provided patient education   Is the patient/caregiver able to teach back Sepsis?  S - Shivering,fever or very cold, S - Short of breath, S - Sleepy, difficult to arouse,confused   Nursing interventions  Nurse provided reassurance to patient   Is patient/caregiver able to teach back steps to recovery at home?  Set small, achievable goals for return to baseline health, Rest and regain strength, Eat a balanced diet, Make a list of questions for PCP appoinment   Is the patient/caregiver able to teach back signs and symptoms of worsening  condition:  Fever, Hyperthermia, Edema, High blood glucose without diabetes, Shortness of breath/rapid respiratory rate   If the patient is a current smoker, are they able to teach back resources for cessation?  Not a smoker   Is the patient/caregiver able to teach back the hierarchy of who to call/visit for symptoms/problems? PCP, Specialist, Home health nurse, Urgent Care, ED, 911  Yes   Additional teach back comments  Says Blood sugar is going low, Levemir down to 5 units, novolog with meals-12large meals and small use 8units   Week 2 call completed?  Yes   Wrap up additional comments  He is some better. Appt kept, meds adjusted.          Poornima Beltrán RN

## 2021-03-03 ENCOUNTER — HOSPITAL ENCOUNTER (OUTPATIENT)
Dept: WOUND CARE | Facility: HOSPITAL | Age: 46
Discharge: HOME | End: 2021-03-03

## 2021-03-03 VITALS
SYSTOLIC BLOOD PRESSURE: 123 MMHG | RESPIRATION RATE: 18 BRPM | HEART RATE: 92 BPM | DIASTOLIC BLOOD PRESSURE: 70 MMHG | TEMPERATURE: 98 F

## 2021-03-03 DIAGNOSIS — L89.153 PRESSURE INJURY OF SACRAL REGION, STAGE 3 (HCC): ICD-10-CM

## 2021-03-03 DIAGNOSIS — L89.314 PRESSURE INJURY OF RIGHT BUTTOCK, STAGE 4 (HCC): ICD-10-CM

## 2021-03-03 DIAGNOSIS — L89.002 PRESSURE INJURY OF ELBOW, STAGE 2, UNSPECIFIED LATERALITY (HCC): ICD-10-CM

## 2021-03-03 DIAGNOSIS — L89.304 PRESSURE INJURY OF BUTTOCK, STAGE 4, UNSPECIFIED LATERALITY (HCC): Primary | ICD-10-CM

## 2021-03-03 PROCEDURE — 63710000001 MAGIC BARRIER CREAM 60 G BOTTLE: Performed by: NURSE PRACTITIONER

## 2021-03-03 PROCEDURE — A9270 NON-COVERED ITEM OR SERVICE: HCPCS | Performed by: NURSE PRACTITIONER

## 2021-03-03 RX ORDER — SODIUM HYPOCHLORITE 1.25 MG/ML
1 SOLUTION TOPICAL AS NEEDED
Status: CANCELLED | OUTPATIENT
Start: 2021-03-03

## 2021-03-03 RX ORDER — CASTOR OIL AND BALSAM, PERU 788; 87 MG/G; MG/G
1 OINTMENT TOPICAL AS NEEDED
Status: CANCELLED | OUTPATIENT
Start: 2021-03-17

## 2021-03-03 RX ORDER — SODIUM HYPOCHLORITE 2.5 MG/ML
1 SOLUTION TOPICAL AS NEEDED
Status: CANCELLED | OUTPATIENT
Start: 2021-03-17

## 2021-03-03 RX ORDER — LIDOCAINE HYDROCHLORIDE 20 MG/ML
JELLY TOPICAL AS NEEDED
Status: CANCELLED | OUTPATIENT
Start: 2021-03-17

## 2021-03-03 RX ADMIN — Medication 1 APPLICATION: at 14:56
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
## 2021-03-06 ENCOUNTER — NURSE TRIAGE (OUTPATIENT)
Dept: CALL CENTER | Facility: HOSPITAL | Age: 46
End: 2021-03-06

## 2021-03-06 LAB
BACTERIA SPEC AEROBE CULT: ABNORMAL
BACTERIA SPEC AEROBE CULT: ABNORMAL
GRAM STN SPEC: ABNORMAL

## 2021-03-07 NOTE — TELEPHONE ENCOUNTER
"    Reason for Disposition  • [1] Low blood glucose (< 70 mg/dL  or 3.9 mmol/L) persists > 30 minutes AND [2] using low blood sugar Care Advice    Additional Information  • Negative: Unconscious or difficult to awaken  • Negative: Seizure occurs  • Negative: Acting confused (e.g., disoriented, slurred speech)  • Negative: Very weak (e.g., can't stand)  • Negative: Sounds like a life-threatening emergency to the triager  • Negative: [1] Vomiting AND [2] signs of dehydration (e.g., very dry mouth, lightheaded, dark urine)  • Negative: [1] Low blood sugar symptoms persist > 30 minutes AND [2] using low blood sugar Care Advice  • Negative: Patient sounds very sick or weak to the triager    Answer Assessment - Initial Assessment Questions  1. SYMPTOMS: \"What symptoms are you concerned about?\"      Low blood sugar  2. ONSET:  \"When did the symptoms start?\"      2 weeks  3. BLOOD GLUCOSE: \"What is your blood glucose level?\"       60  4. USUAL RANGE: \"What is your blood glucose level usually?\" (e.g., usual fasting morning value, usual evening value)      >300  5. TYPE 1 or 2:  \"Do you know what type of diabetes you have?\"  (e.g., Type 1, Type 2, Gestational; doesn't know)       T2  6. INSULIN: \"Do you take insulin?\" \"What type of insulin(s) do you use? What is the mode of delivery? (syringe, pen; injection or pump) \"When did you last give yourself an insulin dose?\" (i.e., time or hours/minutes ago) \"How much did you give?\" (i.e., how many units)      Yes; basaglar, novolog, metformin, levemir  7. DIABETES PILLS: \"Do you take any pills for your diabetes?\"      metformin  8. OTHER SYMPTOMS: \"Do you have any symptoms?\" (e.g., fever, frequent urination, difficulty breathing, vomiting)      Decreased urination;   9. LOW BLOOD GLUCOSE TREATMENT: \"What have you done so far to treat the low blood glucose level?\"      Orange juice and peanut butter  10. FOOD: \"When did you last eat or drink?\"        Salad an hour ago  11. ALONE: " "\"Are you alone right now or is someone with you?\"         Denies; brother is with him  12. PREGNANCY: \"Is there any chance you are pregnant?\" \"When was your last menstrual period?\"        na    Protocols used: DIABETES - LOW BLOOD SUGAR-ADULT-AH      "

## 2021-03-09 ENCOUNTER — READMISSION MANAGEMENT (OUTPATIENT)
Dept: CALL CENTER | Facility: HOSPITAL | Age: 46
End: 2021-03-09

## 2021-03-09 NOTE — OUTREACH NOTE
Sepsis Week 3 Survey      Responses   [facility] patient discharged from?  [facility]   Does the patient have one of the following disease processes/diagnoses(primary or secondary)?  Sepsis   Week 3 attempt successful?  No   Unsuccessful attempts  Attempt 1          Estella Carlson RN

## 2021-03-12 ENCOUNTER — READMISSION MANAGEMENT (OUTPATIENT)
Dept: CALL CENTER | Facility: HOSPITAL | Age: 46
End: 2021-03-12

## 2021-03-12 NOTE — OUTREACH NOTE
Sepsis Week 3 Survey      Responses   [facility] [place] patient discharged from?  [facility]   Does the patient have one of the following disease processes/diagnoses(primary or secondary)?  Sepsis   Week 3 attempt successful?  Yes   Call start time  1042   Call end time  1047   Medication alerts for this patient  will be finishing his antibiotic on Sunday   Meds reviewed with patient/caregiver?  Yes   Is the patient taking all medications as directed (includes completed medication regime)?  Yes   Comments regarding appointments  has seen provider and will be seeing wound care on Monday   Has the patient kept scheduled appointments due by today?  Yes   What is the patient's perception of their health status since discharge?  Improving   Is the patient/caregiver able to teach back Sepsis?  S - Shivering,fever or very cold, E - Extreme pain or generalized discomfort (worst ever,especially abdomen), P - Pale or discolored skin, S - Sleepy, difficult to arouse,confused, I -   I feel like I might die-a feeling of hopelessness, S - Short of breath [discussed with the brother]   Week 3 call completed?  Yes   Wrap up additional comments  at time of the call HH was visiting, brother feels he is improving          María Claros RN

## 2021-03-17 ENCOUNTER — HOSPITAL ENCOUNTER (OUTPATIENT)
Dept: WOUND CARE | Facility: HOSPITAL | Age: 46
Discharge: HOME | End: 2021-03-17

## 2021-03-17 VITALS
SYSTOLIC BLOOD PRESSURE: 159 MMHG | RESPIRATION RATE: 18 BRPM | HEART RATE: 98 BPM | DIASTOLIC BLOOD PRESSURE: 95 MMHG | TEMPERATURE: 98.7 F

## 2021-03-17 DIAGNOSIS — L89.002 PRESSURE INJURY OF ELBOW, STAGE 2, UNSPECIFIED LATERALITY (HCC): Primary | ICD-10-CM

## 2021-03-17 PROCEDURE — 63710000001 MAGIC BARRIER CREAM 60 G BOTTLE: Performed by: NURSE PRACTITIONER

## 2021-03-17 PROCEDURE — 63710000001 SODIUM HYPOCHLORITE 0.25 % SOLUTION: Performed by: NURSE PRACTITIONER

## 2021-03-17 PROCEDURE — A9270 NON-COVERED ITEM OR SERVICE: HCPCS | Performed by: NURSE PRACTITIONER

## 2021-03-17 RX ORDER — CASTOR OIL AND BALSAM, PERU 788; 87 MG/G; MG/G
1 OINTMENT TOPICAL AS NEEDED
Status: CANCELLED | OUTPATIENT
Start: 2021-03-31

## 2021-03-17 RX ORDER — SODIUM HYPOCHLORITE 2.5 MG/ML
1 SOLUTION TOPICAL AS NEEDED
Status: CANCELLED | OUTPATIENT
Start: 2021-03-31

## 2021-03-17 RX ORDER — LIDOCAINE HYDROCHLORIDE 20 MG/ML
JELLY TOPICAL AS NEEDED
Status: CANCELLED | OUTPATIENT
Start: 2021-03-31

## 2021-03-17 RX ORDER — SODIUM HYPOCHLORITE 2.5 MG/ML
1 SOLUTION TOPICAL AS NEEDED
Status: DISCONTINUED | OUTPATIENT
Start: 2021-03-17 | End: 2021-03-18 | Stop reason: HOSPADM

## 2021-03-17 RX ORDER — SODIUM HYPOCHLORITE 1.25 MG/ML
1 SOLUTION TOPICAL AS NEEDED
Status: CANCELLED | OUTPATIENT
Start: 2021-03-17

## 2021-03-17 RX ADMIN — Medication 1 APPLICATION: at 15:42

## 2021-03-17 RX ADMIN — HYOSCYAMINE SULFATE 473 ML: 16 SOLUTION at 15:06

## 2021-03-22 ENCOUNTER — READMISSION MANAGEMENT (OUTPATIENT)
Dept: CALL CENTER | Facility: HOSPITAL | Age: 46
End: 2021-03-22

## 2021-03-23 ENCOUNTER — READMISSION MANAGEMENT (OUTPATIENT)
Dept: CALL CENTER | Facility: HOSPITAL | Age: 46
End: 2021-03-23

## 2021-03-23 NOTE — OUTREACH NOTE
Sepsis Week 4 Survey      Responses   [facility] patient discharged from?  [facility]   Does the patient have one of the following disease processes/diagnoses(primary or secondary)?  Sepsis   Week 4 attempt successful?  Yes   Call start time  1535   Call end time  1601   Discharge diagnosis  Septic shock 2/2 UTI   Is patient permission given to speak with other caregiver?  Yes   Person spoke with today (if not patient) and relationship  Pineda   Meds reviewed with patient/caregiver?  Yes   Is the patient having any side effects they believe may be caused by any medication additions or changes?  Yes   Is the patient taking all medications as directed (includes completed medication regime)?  Yes   Has the patient kept scheduled appointments due by today?  Yes   Is the patient still receiving Home Health Services?  Yes   Psychosocial issues?  No   What is the patient's perception of their health status since discharge?  Improving   Nursing interventions  Nurse provided patient education   Is the patient/caregiver able to teach back Sepsis?  S - Shivering,fever or very cold, P - Pale or discolored skin, S - Short of breath   Nursing interventions  Nurse provided reassurance to patient   Is patient/caregiver able to teach back steps to recovery at home?  Set small, achievable goals for return to baseline health, Rest and regain strength, Make a list of questions for PCP appoinment, Eat a balanced diet, Exercise as tolerated   Is the patient/caregiver able to teach back signs and symptoms of worsening condition:  Fever, Edema, Shortness of breath/rapid respiratory rate   If the patient is a current smoker, are they able to teach back resources for cessation?  Not a smoker   Is the patient/caregiver able to teach back the hierarchy of who to call/visit for symptoms/problems? PCP, Specialist, Home health nurse, Urgent Care, ED, 911  Yes   Additional teach back comments  Labs drawn last week, brother wanting results. Wound  care Closed today. PCP found results all clear per GUANACO Robles.   Week 4 call completed?  Yes   Would the patient like one additional call?  No   Graduated  Yes   Is the patient interested in additional calls from an ambulatory ?  NOTE:  applies to high risk patients requiring additional follow-up.  No   Did the patient feel the follow up calls were helpful during their recovery period?  Yes   Was the number of calls appropriate?  Yes   Wrap up additional comments  PCP will notify family of clear labs.          Poornima Beltrán RN
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
## 2021-03-31 ENCOUNTER — HOSPITAL ENCOUNTER (OUTPATIENT)
Dept: WOUND CARE | Facility: HOSPITAL | Age: 46
Discharge: HOME | End: 2021-03-31

## 2021-03-31 VITALS
RESPIRATION RATE: 18 BRPM | SYSTOLIC BLOOD PRESSURE: 167 MMHG | DIASTOLIC BLOOD PRESSURE: 84 MMHG | TEMPERATURE: 98.6 F | HEART RATE: 83 BPM

## 2021-03-31 DIAGNOSIS — L89.153 PRESSURE INJURY OF SACRAL REGION, STAGE 3 (HCC): ICD-10-CM

## 2021-03-31 DIAGNOSIS — L89.002 PRESSURE INJURY OF ELBOW, STAGE 2, UNSPECIFIED LATERALITY (HCC): ICD-10-CM

## 2021-03-31 DIAGNOSIS — L89.324 PRESSURE INJURY OF LEFT BUTTOCK, STAGE 4 (HCC): ICD-10-CM

## 2021-03-31 DIAGNOSIS — L89.314 PRESSURE INJURY OF RIGHT BUTTOCK, STAGE 4 (HCC): Primary | ICD-10-CM

## 2021-03-31 PROCEDURE — A9270 NON-COVERED ITEM OR SERVICE: HCPCS | Performed by: NURSE PRACTITIONER

## 2021-03-31 PROCEDURE — 63710000001 SODIUM HYPOCHLORITE 0.25 % SOLUTION: Performed by: NURSE PRACTITIONER

## 2021-03-31 PROCEDURE — 63710000001 MAGIC BARRIER CREAM 60 G BOTTLE: Performed by: NURSE PRACTITIONER

## 2021-03-31 RX ORDER — LIDOCAINE HYDROCHLORIDE 20 MG/ML
JELLY TOPICAL AS NEEDED
Status: CANCELLED | OUTPATIENT
Start: 2021-04-14

## 2021-03-31 RX ORDER — SODIUM HYPOCHLORITE 2.5 MG/ML
1 SOLUTION TOPICAL AS NEEDED
Status: DISCONTINUED | OUTPATIENT
Start: 2021-03-31 | End: 2021-04-01 | Stop reason: HOSPADM

## 2021-03-31 RX ORDER — SODIUM HYPOCHLORITE 1.25 MG/ML
1 SOLUTION TOPICAL AS NEEDED
Status: CANCELLED | OUTPATIENT
Start: 2021-03-31

## 2021-03-31 RX ORDER — SODIUM HYPOCHLORITE 2.5 MG/ML
1 SOLUTION TOPICAL AS NEEDED
Status: CANCELLED | OUTPATIENT
Start: 2021-04-14

## 2021-03-31 RX ORDER — CASTOR OIL AND BALSAM, PERU 788; 87 MG/G; MG/G
1 OINTMENT TOPICAL AS NEEDED
Status: CANCELLED | OUTPATIENT
Start: 2021-04-14

## 2021-03-31 RX ADMIN — Medication 1 APPLICATION: at 14:57

## 2021-03-31 RX ADMIN — HYOSCYAMINE SULFATE 473 ML: 16 SOLUTION at 14:57

## 2021-04-14 ENCOUNTER — HOSPITAL ENCOUNTER (OUTPATIENT)
Dept: WOUND CARE | Facility: HOSPITAL | Age: 46
Discharge: HOME | End: 2021-04-14

## 2021-04-14 VITALS
HEART RATE: 92 BPM | TEMPERATURE: 98.6 F | SYSTOLIC BLOOD PRESSURE: 137 MMHG | DIASTOLIC BLOOD PRESSURE: 83 MMHG | RESPIRATION RATE: 20 BRPM

## 2021-04-14 DIAGNOSIS — L89.153 PRESSURE INJURY OF SACRAL REGION, STAGE 3 (HCC): ICD-10-CM

## 2021-04-14 DIAGNOSIS — L89.314 PRESSURE INJURY OF RIGHT BUTTOCK, STAGE 4 (HCC): Primary | ICD-10-CM

## 2021-04-14 DIAGNOSIS — L89.002 PRESSURE INJURY OF ELBOW, STAGE 2, UNSPECIFIED LATERALITY (HCC): ICD-10-CM

## 2021-04-14 DIAGNOSIS — L89.324 PRESSURE INJURY OF LEFT BUTTOCK, STAGE 4 (HCC): ICD-10-CM

## 2021-04-14 PROCEDURE — 63710000001 MAGIC BARRIER CREAM 60 G BOTTLE: Performed by: NURSE PRACTITIONER

## 2021-04-14 PROCEDURE — A9270 NON-COVERED ITEM OR SERVICE: HCPCS | Performed by: NURSE PRACTITIONER

## 2021-04-14 RX ORDER — LIDOCAINE HYDROCHLORIDE 20 MG/ML
JELLY TOPICAL AS NEEDED
Status: CANCELLED | OUTPATIENT
Start: 2021-05-05

## 2021-04-14 RX ORDER — SODIUM HYPOCHLORITE 1.25 MG/ML
1 SOLUTION TOPICAL AS NEEDED
Status: CANCELLED | OUTPATIENT
Start: 2021-04-14

## 2021-04-14 RX ORDER — SODIUM HYPOCHLORITE 2.5 MG/ML
1 SOLUTION TOPICAL AS NEEDED
Status: CANCELLED | OUTPATIENT
Start: 2021-05-05

## 2021-04-14 RX ORDER — CASTOR OIL AND BALSAM, PERU 788; 87 MG/G; MG/G
1 OINTMENT TOPICAL AS NEEDED
Status: CANCELLED | OUTPATIENT
Start: 2021-05-05

## 2021-04-14 RX ADMIN — Medication 1 APPLICATION: at 14:51

## 2021-04-14 NOTE — PATIENT INSTRUCTIONS
PICC Removal, Adult, Care After  This sheet gives you information about how to care for yourself after your procedure. Your health care provider may also give you more specific instructions. If you have problems or questions, contact your health care provider.  What can I expect after the procedure?  After your procedure, it is common to have:  · Tenderness or soreness.  · Redness, swelling, or a scab where the PICC was removed (exit site).  Follow these instructions at home:  For the first 24 hours after the procedure    · Keep the bandage (dressing) on the exit site clean and dry. Do not remove the dressing until your health care provider tells you to do so.  · Check your arm often for signs and symptoms of an infection. Check for:  ? A red streak that spreads away from the dressing.  ? Blood or fluid that you can see on the dressing.  ? More redness or swelling.  · Do not lift anything heavy or do activities that require great effort until your health care provider says it is okay. You should avoid:  ? Lifting weights.  ? Yard work.  ? Any physical activity with repetitive arm movement.  · Watch closely for any signs of an air bubble in the vein (air embolism). This is a rare but serious complication. If you have signs of air embolism, call 911 immediately and lie down on your left side to keep the air from moving into the lungs. Signs of an air embolism include:  ? Difficulty breathing.  ? Chest pain.  ? Coughing or wheezing.  ? Skin that is pale, blue, cold, or clammy.  ? Rapid pulse.  ? Rapid breathing.  ? Fainting.  After 24 Hours have passed:  · Remove your dressing as told by your health care provider. Make sure you wash your hands with soap and water before and after you change the dressing. If soap and water are not available, use hand .  · Return to your normal activities as told by your health care provider.  · A small scab may develop over the exit site. Do not pick at the scab.  · When  bathing or showering, gently wash the exit site with soap and water. Pat it dry.  · Watch for signs of infection, such as:  ? Fever or chills.  ? Swollen glands under the arm.  ? More redness, swelling, or soreness in the arm.  ? Blood, fluid, or pus coming from the exit site.  ? Warmth or a bad smell at the exit site.  ? A red streak spreading away from the exit site.  General instructions  · Take over-the-counter and prescription medicines only as told by your health care provider. Do not take any new medicines without checking with your health care provider first.  · If you were prescribed an antibiotic medicine, apply or take it as told by your health care provider. Do not stop using the antibiotic even if your condition improves.  · Keep all follow-up visits as told by your health care provider. This is important.  Contact a health care provider if:  · You have a fever or chills.  · You have soreness, redness, or swelling on your exit site, and it gets worse.  · You have swollen glands under your arm.  · You have any of the following symptoms at your exit site:  ? Blood, fluid, or pus.  ? Unusual warmth.  ? A bad smell.  ? A red streak spreading away from the exit site.  Get help right away if:  · You have numbness or tingling in your fingers, hand, or arm.  · Your arm looks blue and feels cold.  · You have signs of an air embolism, such as:  ? Difficulty breathing.  ? Chest pain.  ? Coughing or wheezing.  ? Skin that is pale, blue, cold, or clammy.  ? Rapid pulse.  ? Rapid breathing.  ? Fainting.  These symptoms may represent a serious problem that is an emergency. Do not wait to see if the symptoms will go away. Get medical help right away. Call your local emergency services (911 in the [place] Do not drive yourself to the hospital.  Summary  · After your procedure, it is common to have tenderness or soreness, redness, swelling, or a scab at the exit site.  · Keep the dressing over the exit site clean and dry.  Do not remove the dressing until your health care provider tells you to do so.  · Do not lift anything heavy or do activities that require great effort until your health care provider says it is okay.  · Watch closely for any signs of an air embolism. If you have signs of air embolism, call 911 immediately and lie down on your left side.  This information is not intended to replace advice given to you by your health care provider. Make sure you discuss any questions you have with your health care provider.  Document Revised: 11/30/2018 Document Reviewed: 02/13/2018  [employer] Patient Education © 2021 [employer]
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
## 2021-04-14 NOTE — CODE DOCUMENTATION
4/14/2021 1450.  Called into room per Austen, wound care RN to look at picc line.  Assessment of picc was that line was not covered with sterile dressing, migrated out with 11-12 cm exposed. A 2x2 and tape covering site.  Spoke with pt about this.  Stated that FRAN José home health came out Monday. And that she was an LPN, stated she couldn't change dressing. After talking with picc line nurse, and pt here to see GUANACO Chandra.  Was decided that pulling picc was needed, to lessen the risk of infection. Called Dr Danis Md to you, and [facility] about pulling picc line.  Hospitalist  states he was discharged with plans of being seen by Md to you.  Left message with them.  Spoke with Estefanía Lopez LPN about problem with LPN treating pt that needed picc dsg change.  Pt educated about s/s of infection and things to monitor for.  4/14/20211505 Picc line pulled.  Pt in supine position.  Instructed to bear down while pulling.  picc measures 48cm. Site cleaned with  betadine swab. Vaseline gauze, sterile 2x2's  coban dressing applied. Pressure also applied.   4/14/2021 1600 pt with no c/o.  66 and pulse 100 sitting.

## 2021-04-28 ENCOUNTER — APPOINTMENT (OUTPATIENT)
Dept: WOUND CARE | Facility: HOSPITAL | Age: 46
End: 2021-04-28

## 2021-05-05 ENCOUNTER — HOSPITAL ENCOUNTER (OUTPATIENT)
Dept: WOUND CARE | Facility: HOSPITAL | Age: 46
Discharge: HOME | End: 2021-05-05
Admitting: NURSE PRACTITIONER

## 2021-05-05 VITALS
SYSTOLIC BLOOD PRESSURE: 108 MMHG | HEART RATE: 99 BPM | RESPIRATION RATE: 20 BRPM | DIASTOLIC BLOOD PRESSURE: 72 MMHG | TEMPERATURE: 98.3 F

## 2021-05-05 DIAGNOSIS — L89.304 PRESSURE INJURY OF BUTTOCK, STAGE 4, UNSPECIFIED LATERALITY (HCC): ICD-10-CM

## 2021-05-05 DIAGNOSIS — L89.002 PRESSURE INJURY OF ELBOW, STAGE 2, UNSPECIFIED LATERALITY (HCC): ICD-10-CM

## 2021-05-05 DIAGNOSIS — L89.324 PRESSURE INJURY OF LEFT BUTTOCK, STAGE 4 (HCC): ICD-10-CM

## 2021-05-05 DIAGNOSIS — L89.153 PRESSURE INJURY OF SACRAL REGION, STAGE 3 (HCC): ICD-10-CM

## 2021-05-05 DIAGNOSIS — L89.314 PRESSURE INJURY OF RIGHT BUTTOCK, STAGE 4 (HCC): Primary | ICD-10-CM

## 2021-05-05 PROCEDURE — 63710000001 MAGIC BARRIER CREAM 60 G BOTTLE: Performed by: NURSE PRACTITIONER

## 2021-05-05 PROCEDURE — A9270 NON-COVERED ITEM OR SERVICE: HCPCS | Performed by: NURSE PRACTITIONER

## 2021-05-05 PROCEDURE — 84134 ASSAY OF PREALBUMIN: CPT | Performed by: PHYSICIAN ASSISTANT

## 2021-05-05 RX ORDER — SODIUM HYPOCHLORITE 2.5 MG/ML
1 SOLUTION TOPICAL AS NEEDED
Status: CANCELLED | OUTPATIENT
Start: 2021-06-23

## 2021-05-05 RX ORDER — CASTOR OIL AND BALSAM, PERU 788; 87 MG/G; MG/G
1 OINTMENT TOPICAL AS NEEDED
Status: CANCELLED | OUTPATIENT
Start: 2021-06-23

## 2021-05-05 RX ORDER — SODIUM HYPOCHLORITE 1.25 MG/ML
1 SOLUTION TOPICAL AS NEEDED
Status: CANCELLED | OUTPATIENT
Start: 2021-05-05

## 2021-05-05 RX ORDER — LIDOCAINE HYDROCHLORIDE 20 MG/ML
JELLY TOPICAL AS NEEDED
Status: CANCELLED | OUTPATIENT
Start: 2021-06-23

## 2021-05-05 RX ADMIN — Medication 1 APPLICATION: at 15:15

## 2021-05-06 LAB — PREALB SERPL-MCNC: 15.7 MG/DL (ref 20–40)
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
## 2021-06-02 ENCOUNTER — HOSPITAL ENCOUNTER (INPATIENT)
Facility: HOSPITAL | Age: 46
LOS: 12 days | Discharge: HOME HEALTH | End: 2021-06-14
Attending: EMERGENCY MEDICINE | Admitting: INTERNAL MEDICINE

## 2021-06-02 ENCOUNTER — APPOINTMENT (OUTPATIENT)
Dept: GENERAL RADIOLOGY | Facility: HOSPITAL | Age: 46
End: 2021-06-02

## 2021-06-02 DIAGNOSIS — L08.9 SEPSIS DUE TO SKIN INFECTION (HCC): ICD-10-CM

## 2021-06-02 DIAGNOSIS — A41.9 SEPSIS DUE TO SKIN INFECTION (HCC): ICD-10-CM

## 2021-06-02 DIAGNOSIS — E87.20 LACTIC ACIDOSIS: ICD-10-CM

## 2021-06-02 DIAGNOSIS — R65.21 SHOCK, SEPTIC (HCC): ICD-10-CM

## 2021-06-02 DIAGNOSIS — A41.9 SHOCK, SEPTIC (HCC): ICD-10-CM

## 2021-06-02 DIAGNOSIS — L89.309 PRESSURE INJURY OF SKIN OF BUTTOCK, UNSPECIFIED INJURY STAGE, UNSPECIFIED LATERALITY: ICD-10-CM

## 2021-06-02 DIAGNOSIS — R07.89 CHEST PRESSURE: ICD-10-CM

## 2021-06-02 LAB
6-ACETYL MORPHINE: NEGATIVE
ALBUMIN SERPL-MCNC: 2.69 G/DL (ref 3.5–5.2)
ALBUMIN/GLOB SERPL: 0.5 G/DL
ALP SERPL-CCNC: 157 U/L (ref 39–117)
ALT SERPL W P-5'-P-CCNC: 26 U/L (ref 1–41)
AMPHET+METHAMPHET UR QL: NEGATIVE
ANION GAP SERPL CALCULATED.3IONS-SCNC: 15.5 MMOL/L (ref 5–15)
ANISOCYTOSIS BLD QL: NORMAL
AST SERPL-CCNC: 27 U/L (ref 1–40)
BACTERIA UR QL AUTO: ABNORMAL /HPF
BARBITURATES UR QL SCN: NEGATIVE
BASOPHILS # BLD AUTO: 0.03 10*3/MM3 (ref 0–0.2)
BASOPHILS NFR BLD AUTO: 0.3 % (ref 0–1.5)
BENZODIAZ UR QL SCN: NEGATIVE
BILIRUB SERPL-MCNC: 0.3 MG/DL (ref 0–1.2)
BILIRUB UR QL STRIP: NEGATIVE
BUN SERPL-MCNC: 3 MG/DL (ref 6–20)
BUN/CREAT SERPL: 3.8 (ref 7–25)
BUPRENORPHINE SERPL-MCNC: NEGATIVE NG/ML
CALCIUM SPEC-SCNC: 8.7 MG/DL (ref 8.6–10.5)
CANNABINOIDS SERPL QL: NEGATIVE
CHLORIDE SERPL-SCNC: 92 MMOL/L (ref 98–107)
CK SERPL-CCNC: 22 U/L (ref 20–200)
CLARITY UR: ABNORMAL
CO2 SERPL-SCNC: 20.5 MMOL/L (ref 22–29)
COCAINE UR QL: NEGATIVE
COLOR UR: YELLOW
CREAT SERPL-MCNC: 0.8 MG/DL (ref 0.76–1.27)
CRP SERPL-MCNC: 14.87 MG/DL (ref 0–0.5)
D-LACTATE SERPL-SCNC: 5.8 MMOL/L (ref 0.5–2)
DEPRECATED RDW RBC AUTO: 43.9 FL (ref 37–54)
EOSINOPHIL # BLD AUTO: 0.05 10*3/MM3 (ref 0–0.4)
EOSINOPHIL NFR BLD AUTO: 0.5 % (ref 0.3–6.2)
ERYTHROCYTE [DISTWIDTH] IN BLOOD BY AUTOMATED COUNT: 15.5 % (ref 12.3–15.4)
ERYTHROCYTE [SEDIMENTATION RATE] IN BLOOD: >100 MM/HR (ref 0–15)
ETHANOL BLD-MCNC: <10 MG/DL (ref 0–10)
ETHANOL UR QL: <0.01 %
GFR SERPL CREATININE-BSD FRML MDRD: 106 ML/MIN/1.73
GLOBULIN UR ELPH-MCNC: 5.1 GM/DL
GLUCOSE SERPL-MCNC: 614 MG/DL (ref 65–99)
GLUCOSE UR STRIP-MCNC: ABNORMAL MG/DL
HCT VFR BLD AUTO: 30 % (ref 37.5–51)
HGB BLD-MCNC: 8.5 G/DL (ref 13–17.7)
HGB UR QL STRIP.AUTO: ABNORMAL
HYALINE CASTS UR QL AUTO: ABNORMAL /LPF
HYPOCHROMIA BLD QL: NORMAL
IMM GRANULOCYTES # BLD AUTO: 0.06 10*3/MM3 (ref 0–0.05)
IMM GRANULOCYTES NFR BLD AUTO: 0.6 % (ref 0–0.5)
KETONES UR QL STRIP: NEGATIVE
LEUKOCYTE ESTERASE UR QL STRIP.AUTO: ABNORMAL
LYMPHOCYTES # BLD AUTO: 1.13 10*3/MM3 (ref 0.7–3.1)
LYMPHOCYTES NFR BLD AUTO: 11.2 % (ref 19.6–45.3)
MAGNESIUM SERPL-MCNC: 1.4 MG/DL (ref 1.6–2.6)
MCH RBC QN AUTO: 22.2 PG (ref 26.6–33)
MCHC RBC AUTO-ENTMCNC: 28.3 G/DL (ref 31.5–35.7)
MCV RBC AUTO: 78.3 FL (ref 79–97)
METHADONE UR QL SCN: NEGATIVE
MICROCYTES BLD QL: NORMAL
MONOCYTES # BLD AUTO: 0.39 10*3/MM3 (ref 0.1–0.9)
MONOCYTES NFR BLD AUTO: 3.9 % (ref 5–12)
NEUTROPHILS NFR BLD AUTO: 8.42 10*3/MM3 (ref 1.7–7)
NEUTROPHILS NFR BLD AUTO: 83.5 % (ref 42.7–76)
NITRITE UR QL STRIP: POSITIVE
NRBC BLD AUTO-RTO: 0 /100 WBC (ref 0–0.2)
NT-PROBNP SERPL-MCNC: 1812 PG/ML (ref 0–450)
OPIATES UR QL: NEGATIVE
OXYCODONE UR QL SCN: NEGATIVE
PCP UR QL SCN: NEGATIVE
PH UR STRIP.AUTO: 6.5 [PH] (ref 5–8)
PLAT MORPH BLD: NORMAL
PLATELET # BLD AUTO: 488 10*3/MM3 (ref 140–450)
PMV BLD AUTO: 9.9 FL (ref 6–12)
POTASSIUM SERPL-SCNC: 3.9 MMOL/L (ref 3.5–5.2)
PROT SERPL-MCNC: 7.8 G/DL (ref 6–8.5)
PROT UR QL STRIP: ABNORMAL
RBC # BLD AUTO: 3.83 10*6/MM3 (ref 4.14–5.8)
RBC # UR: ABNORMAL /HPF
REF LAB TEST METHOD: ABNORMAL
SODIUM SERPL-SCNC: 128 MMOL/L (ref 136–145)
SP GR UR STRIP: 1.03 (ref 1–1.03)
SQUAMOUS #/AREA URNS HPF: ABNORMAL /HPF
T4 FREE SERPL-MCNC: 1.31 NG/DL (ref 0.93–1.7)
TROPONIN T SERPL-MCNC: <0.01 NG/ML (ref 0–0.03)
TROPONIN T SERPL-MCNC: <0.01 NG/ML (ref 0–0.03)
TSH SERPL DL<=0.05 MIU/L-ACNC: 0.88 UIU/ML (ref 0.27–4.2)
UROBILINOGEN UR QL STRIP: ABNORMAL
WBC # BLD AUTO: 10.08 10*3/MM3 (ref 3.4–10.8)
WBC UR QL AUTO: ABNORMAL /HPF
YEAST URNS QL MICRO: ABNORMAL /HPF

## 2021-06-02 PROCEDURE — 83605 ASSAY OF LACTIC ACID: CPT | Performed by: EMERGENCY MEDICINE

## 2021-06-02 PROCEDURE — 87150 DNA/RNA AMPLIFIED PROBE: CPT | Performed by: EMERGENCY MEDICINE

## 2021-06-02 PROCEDURE — 93010 ELECTROCARDIOGRAM REPORT: CPT | Performed by: INTERNAL MEDICINE

## 2021-06-02 PROCEDURE — 36415 COLL VENOUS BLD VENIPUNCTURE: CPT

## 2021-06-02 PROCEDURE — 84145 PROCALCITONIN (PCT): CPT | Performed by: EMERGENCY MEDICINE

## 2021-06-02 PROCEDURE — 87186 SC STD MICRODIL/AGAR DIL: CPT

## 2021-06-02 PROCEDURE — 84484 ASSAY OF TROPONIN QUANT: CPT | Performed by: EMERGENCY MEDICINE

## 2021-06-02 PROCEDURE — 83880 ASSAY OF NATRIURETIC PEPTIDE: CPT | Performed by: EMERGENCY MEDICINE

## 2021-06-02 PROCEDURE — 99285 EMERGENCY DEPT VISIT HI MDM: CPT

## 2021-06-02 PROCEDURE — 80307 DRUG TEST PRSMV CHEM ANLYZR: CPT | Performed by: EMERGENCY MEDICINE

## 2021-06-02 PROCEDURE — 87040 BLOOD CULTURE FOR BACTERIA: CPT | Performed by: EMERGENCY MEDICINE

## 2021-06-02 PROCEDURE — 82550 ASSAY OF CK (CPK): CPT | Performed by: EMERGENCY MEDICINE

## 2021-06-02 PROCEDURE — 87077 CULTURE AEROBIC IDENTIFY: CPT | Performed by: EMERGENCY MEDICINE

## 2021-06-02 PROCEDURE — 87147 CULTURE TYPE IMMUNOLOGIC: CPT | Performed by: EMERGENCY MEDICINE

## 2021-06-02 PROCEDURE — 71045 X-RAY EXAM CHEST 1 VIEW: CPT

## 2021-06-02 PROCEDURE — 84443 ASSAY THYROID STIM HORMONE: CPT | Performed by: EMERGENCY MEDICINE

## 2021-06-02 PROCEDURE — 25010000002 VANCOMYCIN: Performed by: EMERGENCY MEDICINE

## 2021-06-02 PROCEDURE — 83735 ASSAY OF MAGNESIUM: CPT | Performed by: EMERGENCY MEDICINE

## 2021-06-02 PROCEDURE — 63710000001 INSULIN REGULAR HUMAN PER 5 UNITS: Performed by: EMERGENCY MEDICINE

## 2021-06-02 PROCEDURE — 85025 COMPLETE CBC W/AUTO DIFF WBC: CPT | Performed by: EMERGENCY MEDICINE

## 2021-06-02 PROCEDURE — 93005 ELECTROCARDIOGRAM TRACING: CPT | Performed by: EMERGENCY MEDICINE

## 2021-06-02 PROCEDURE — 80053 COMPREHEN METABOLIC PANEL: CPT | Performed by: EMERGENCY MEDICINE

## 2021-06-02 PROCEDURE — 84439 ASSAY OF FREE THYROXINE: CPT | Performed by: EMERGENCY MEDICINE

## 2021-06-02 PROCEDURE — 87086 URINE CULTURE/COLONY COUNT: CPT | Performed by: EMERGENCY MEDICINE

## 2021-06-02 PROCEDURE — 86140 C-REACTIVE PROTEIN: CPT | Performed by: EMERGENCY MEDICINE

## 2021-06-02 PROCEDURE — 85652 RBC SED RATE AUTOMATED: CPT | Performed by: EMERGENCY MEDICINE

## 2021-06-02 PROCEDURE — 81001 URINALYSIS AUTO W/SCOPE: CPT | Performed by: EMERGENCY MEDICINE

## 2021-06-02 PROCEDURE — 82077 ASSAY SPEC XCP UR&BREATH IA: CPT | Performed by: EMERGENCY MEDICINE

## 2021-06-02 PROCEDURE — 87186 SC STD MICRODIL/AGAR DIL: CPT | Performed by: EMERGENCY MEDICINE

## 2021-06-02 PROCEDURE — 85007 BL SMEAR W/DIFF WBC COUNT: CPT | Performed by: EMERGENCY MEDICINE

## 2021-06-02 PROCEDURE — 99223 1ST HOSP IP/OBS HIGH 75: CPT | Performed by: INTERNAL MEDICINE

## 2021-06-02 RX ORDER — ACETAMINOPHEN 500 MG
1000 TABLET ORAL ONCE
Status: COMPLETED | OUTPATIENT
Start: 2021-06-02 | End: 2021-06-02

## 2021-06-02 RX ORDER — LEVOFLOXACIN 5 MG/ML
750 INJECTION, SOLUTION INTRAVENOUS ONCE
Status: DISCONTINUED | OUTPATIENT
Start: 2021-06-02 | End: 2021-06-02

## 2021-06-02 RX ORDER — SODIUM CHLORIDE 0.9 % (FLUSH) 0.9 %
10 SYRINGE (ML) INJECTION AS NEEDED
Status: DISCONTINUED | OUTPATIENT
Start: 2021-06-02 | End: 2021-06-14 | Stop reason: HOSPADM

## 2021-06-02 RX ADMIN — SODIUM CHLORIDE 1000 ML: 9 INJECTION, SOLUTION INTRAVENOUS at 21:12

## 2021-06-02 RX ADMIN — SODIUM CHLORIDE 1250 ML: 9 INJECTION, SOLUTION INTRAVENOUS at 22:42

## 2021-06-02 RX ADMIN — HUMAN INSULIN 10 UNITS: 100 INJECTION, SOLUTION SUBCUTANEOUS at 23:59

## 2021-06-02 RX ADMIN — VANCOMYCIN HYDROCHLORIDE 2000 MG: 1 INJECTION, POWDER, LYOPHILIZED, FOR SOLUTION INTRAVENOUS at 23:56

## 2021-06-02 RX ADMIN — ACETAMINOPHEN 1000 MG: 500 TABLET ORAL at 23:58

## 2021-06-02 RX ADMIN — SODIUM CHLORIDE 2 G: 900 INJECTION INTRAVENOUS at 22:49

## 2021-06-03 ENCOUNTER — APPOINTMENT (OUTPATIENT)
Dept: CT IMAGING | Facility: HOSPITAL | Age: 46
End: 2021-06-03

## 2021-06-03 PROBLEM — A41.9 SHOCK, SEPTIC: Status: ACTIVE | Noted: 2021-06-03

## 2021-06-03 PROBLEM — R65.21 SHOCK, SEPTIC: Status: ACTIVE | Noted: 2021-06-03

## 2021-06-03 LAB
ALBUMIN SERPL-MCNC: 2.29 G/DL (ref 3.5–5.2)
ALBUMIN/GLOB SERPL: 0.5 G/DL
ALP SERPL-CCNC: 133 U/L (ref 39–117)
ALT SERPL W P-5'-P-CCNC: 22 U/L (ref 1–41)
ANION GAP SERPL CALCULATED.3IONS-SCNC: 11.1 MMOL/L (ref 5–15)
AST SERPL-CCNC: 19 U/L (ref 1–40)
BACTERIA BLD CULT: ABNORMAL
BASOPHILS # BLD AUTO: 0.03 10*3/MM3 (ref 0–0.2)
BASOPHILS NFR BLD AUTO: 0.4 % (ref 0–1.5)
BILIRUB SERPL-MCNC: 0.3 MG/DL (ref 0–1.2)
BUN SERPL-MCNC: 3 MG/DL (ref 6–20)
BUN/CREAT SERPL: 4.8 (ref 7–25)
CALCIUM SPEC-SCNC: 8.3 MG/DL (ref 8.6–10.5)
CHLORIDE SERPL-SCNC: 99 MMOL/L (ref 98–107)
CO2 SERPL-SCNC: 21.9 MMOL/L (ref 22–29)
CREAT SERPL-MCNC: 0.62 MG/DL (ref 0.76–1.27)
D-LACTATE SERPL-SCNC: 2 MMOL/L (ref 0.5–2)
DEPRECATED RDW RBC AUTO: 43.3 FL (ref 37–54)
EOSINOPHIL # BLD AUTO: 0.13 10*3/MM3 (ref 0–0.4)
EOSINOPHIL NFR BLD AUTO: 1.5 % (ref 0.3–6.2)
ERYTHROCYTE [DISTWIDTH] IN BLOOD BY AUTOMATED COUNT: 15.6 % (ref 12.3–15.4)
FLUAV RNA RESP QL NAA+PROBE: NOT DETECTED
FLUBV RNA RESP QL NAA+PROBE: NOT DETECTED
GFR SERPL CREATININE-BSD FRML MDRD: 142 ML/MIN/1.73
GLOBULIN UR ELPH-MCNC: 4.6 GM/DL
GLUCOSE BLDC GLUCOMTR-MCNC: 119 MG/DL (ref 70–130)
GLUCOSE BLDC GLUCOMTR-MCNC: 180 MG/DL (ref 70–130)
GLUCOSE BLDC GLUCOMTR-MCNC: 253 MG/DL (ref 70–130)
GLUCOSE BLDC GLUCOMTR-MCNC: 308 MG/DL (ref 70–130)
GLUCOSE BLDC GLUCOMTR-MCNC: 328 MG/DL (ref 70–130)
GLUCOSE BLDC GLUCOMTR-MCNC: 361 MG/DL (ref 70–130)
GLUCOSE BLDC GLUCOMTR-MCNC: 362 MG/DL (ref 70–130)
GLUCOSE BLDC GLUCOMTR-MCNC: 369 MG/DL (ref 70–130)
GLUCOSE BLDC GLUCOMTR-MCNC: 448 MG/DL (ref 70–130)
GLUCOSE BLDC GLUCOMTR-MCNC: 452 MG/DL (ref 70–130)
GLUCOSE BLDC GLUCOMTR-MCNC: 75 MG/DL (ref 70–130)
GLUCOSE BLDC GLUCOMTR-MCNC: 76 MG/DL (ref 70–130)
GLUCOSE SERPL-MCNC: 362 MG/DL (ref 65–99)
HCT VFR BLD AUTO: 27.7 % (ref 37.5–51)
HGB BLD-MCNC: 7.7 G/DL (ref 13–17.7)
HYPOCHROMIA BLD QL: NORMAL
IMM GRANULOCYTES # BLD AUTO: 0.09 10*3/MM3 (ref 0–0.05)
IMM GRANULOCYTES NFR BLD AUTO: 1.1 % (ref 0–0.5)
LYMPHOCYTES # BLD AUTO: 1.79 10*3/MM3 (ref 0.7–3.1)
LYMPHOCYTES NFR BLD AUTO: 20.9 % (ref 19.6–45.3)
MAGNESIUM SERPL-MCNC: 1.4 MG/DL (ref 1.6–2.6)
MCH RBC QN AUTO: 21.4 PG (ref 26.6–33)
MCHC RBC AUTO-ENTMCNC: 27.8 G/DL (ref 31.5–35.7)
MCV RBC AUTO: 77.2 FL (ref 79–97)
MICROCYTES BLD QL: NORMAL
MONOCYTES # BLD AUTO: 0.42 10*3/MM3 (ref 0.1–0.9)
MONOCYTES NFR BLD AUTO: 4.9 % (ref 5–12)
NEUTROPHILS NFR BLD AUTO: 6.1 10*3/MM3 (ref 1.7–7)
NEUTROPHILS NFR BLD AUTO: 71.2 % (ref 42.7–76)
NRBC BLD AUTO-RTO: 0.2 /100 WBC (ref 0–0.2)
PLAT MORPH BLD: NORMAL
PLATELET # BLD AUTO: 420 10*3/MM3 (ref 140–450)
PMV BLD AUTO: 9.7 FL (ref 6–12)
POTASSIUM SERPL-SCNC: 3.4 MMOL/L (ref 3.5–5.2)
POTASSIUM SERPL-SCNC: 3.7 MMOL/L (ref 3.5–5.2)
PROCALCITONIN SERPL-MCNC: 0.3 NG/ML (ref 0–0.25)
PROT SERPL-MCNC: 6.9 G/DL (ref 6–8.5)
RBC # BLD AUTO: 3.59 10*6/MM3 (ref 4.14–5.8)
SARS-COV-2 RNA RESP QL NAA+PROBE: NOT DETECTED
SODIUM SERPL-SCNC: 132 MMOL/L (ref 136–145)
TROPONIN T SERPL-MCNC: <0.01 NG/ML (ref 0–0.03)
WBC # BLD AUTO: 8.56 10*3/MM3 (ref 3.4–10.8)

## 2021-06-03 PROCEDURE — 25010000002 VANCOMYCIN: Performed by: INTERNAL MEDICINE

## 2021-06-03 PROCEDURE — 99221 1ST HOSP IP/OBS SF/LOW 40: CPT | Performed by: SURGERY

## 2021-06-03 PROCEDURE — 82962 GLUCOSE BLOOD TEST: CPT

## 2021-06-03 PROCEDURE — 74177 CT ABD & PELVIS W/CONTRAST: CPT

## 2021-06-03 PROCEDURE — 93005 ELECTROCARDIOGRAM TRACING: CPT | Performed by: INTERNAL MEDICINE

## 2021-06-03 PROCEDURE — 87636 SARSCOV2 & INF A&B AMP PRB: CPT | Performed by: EMERGENCY MEDICINE

## 2021-06-03 PROCEDURE — 84132 ASSAY OF SERUM POTASSIUM: CPT | Performed by: INTERNAL MEDICINE

## 2021-06-03 PROCEDURE — 85007 BL SMEAR W/DIFF WBC COUNT: CPT | Performed by: INTERNAL MEDICINE

## 2021-06-03 PROCEDURE — 63710000001 INSULIN ASPART PER 5 UNITS: Performed by: INTERNAL MEDICINE

## 2021-06-03 PROCEDURE — 83735 ASSAY OF MAGNESIUM: CPT | Performed by: INTERNAL MEDICINE

## 2021-06-03 PROCEDURE — 84484 ASSAY OF TROPONIN QUANT: CPT | Performed by: INTERNAL MEDICINE

## 2021-06-03 PROCEDURE — 85025 COMPLETE CBC W/AUTO DIFF WBC: CPT | Performed by: INTERNAL MEDICINE

## 2021-06-03 PROCEDURE — 25010000003 POTASSIUM CHLORIDE 10 MEQ/100ML SOLUTION: Performed by: INTERNAL MEDICINE

## 2021-06-03 PROCEDURE — 63710000001 INSULIN DETEMIR PER 5 UNITS: Performed by: INTERNAL MEDICINE

## 2021-06-03 PROCEDURE — 83605 ASSAY OF LACTIC ACID: CPT | Performed by: EMERGENCY MEDICINE

## 2021-06-03 PROCEDURE — 25010000002 CEFEPIME PER 500 MG: Performed by: INTERNAL MEDICINE

## 2021-06-03 PROCEDURE — 25010000002 IOPAMIDOL 61 % SOLUTION: Performed by: EMERGENCY MEDICINE

## 2021-06-03 PROCEDURE — 93010 ELECTROCARDIOGRAM REPORT: CPT | Performed by: INTERNAL MEDICINE

## 2021-06-03 PROCEDURE — 80053 COMPREHEN METABOLIC PANEL: CPT | Performed by: INTERNAL MEDICINE

## 2021-06-03 RX ORDER — L.ACID,PARA/B.BIFIDUM/S.THERM 8B CELL
1 CAPSULE ORAL DAILY
Status: DISCONTINUED | OUTPATIENT
Start: 2021-06-03 | End: 2021-06-09

## 2021-06-03 RX ORDER — NICOTINE POLACRILEX 4 MG
15 LOZENGE BUCCAL
Status: DISCONTINUED | OUTPATIENT
Start: 2021-06-03 | End: 2021-06-14 | Stop reason: HOSPADM

## 2021-06-03 RX ORDER — ONDANSETRON 4 MG/1
4 TABLET, FILM COATED ORAL EVERY 8 HOURS PRN
Status: DISCONTINUED | OUTPATIENT
Start: 2021-06-03 | End: 2021-06-14 | Stop reason: HOSPADM

## 2021-06-03 RX ORDER — MAGNESIUM SULFATE 1 G/100ML
1 INJECTION INTRAVENOUS AS NEEDED
Status: DISCONTINUED | OUTPATIENT
Start: 2021-06-03 | End: 2021-06-14 | Stop reason: HOSPADM

## 2021-06-03 RX ORDER — GABAPENTIN 400 MG/1
800 CAPSULE ORAL 3 TIMES DAILY
Status: DISCONTINUED | OUTPATIENT
Start: 2021-06-03 | End: 2021-06-14 | Stop reason: HOSPADM

## 2021-06-03 RX ORDER — POTASSIUM CHLORIDE 7.45 MG/ML
10 INJECTION INTRAVENOUS
Status: COMPLETED | OUTPATIENT
Start: 2021-06-03 | End: 2021-06-03

## 2021-06-03 RX ORDER — MAGNESIUM SULFATE HEPTAHYDRATE 40 MG/ML
2 INJECTION, SOLUTION INTRAVENOUS AS NEEDED
Status: DISCONTINUED | OUTPATIENT
Start: 2021-06-03 | End: 2021-06-14 | Stop reason: HOSPADM

## 2021-06-03 RX ORDER — SODIUM CHLORIDE 9 MG/ML
75 INJECTION, SOLUTION INTRAVENOUS CONTINUOUS
Status: DISCONTINUED | OUTPATIENT
Start: 2021-06-03 | End: 2021-06-03

## 2021-06-03 RX ORDER — L.ACID,PARA/B.BIFIDUM/S.THERM 8B CELL
1 CAPSULE ORAL DAILY
Status: DISCONTINUED | OUTPATIENT
Start: 2021-06-03 | End: 2021-06-03

## 2021-06-03 RX ORDER — SODIUM CHLORIDE 0.9 % (FLUSH) 0.9 %
10 SYRINGE (ML) INJECTION EVERY 12 HOURS SCHEDULED
Status: DISCONTINUED | OUTPATIENT
Start: 2021-06-03 | End: 2021-06-14 | Stop reason: HOSPADM

## 2021-06-03 RX ORDER — MAGNESIUM SULFATE HEPTAHYDRATE 40 MG/ML
4 INJECTION, SOLUTION INTRAVENOUS AS NEEDED
Status: DISCONTINUED | OUTPATIENT
Start: 2021-06-03 | End: 2021-06-14 | Stop reason: HOSPADM

## 2021-06-03 RX ORDER — ATORVASTATIN CALCIUM 10 MG/1
10 TABLET, FILM COATED ORAL NIGHTLY
Status: DISCONTINUED | OUTPATIENT
Start: 2021-06-03 | End: 2021-06-14 | Stop reason: HOSPADM

## 2021-06-03 RX ORDER — PANTOPRAZOLE SODIUM 40 MG/1
40 TABLET, DELAYED RELEASE ORAL 2 TIMES DAILY
Status: DISCONTINUED | OUTPATIENT
Start: 2021-06-03 | End: 2021-06-05

## 2021-06-03 RX ORDER — SODIUM HYPOCHLORITE 1.25 MG/ML
SOLUTION TOPICAL EVERY 12 HOURS
Status: DISCONTINUED | OUTPATIENT
Start: 2021-06-03 | End: 2021-06-14 | Stop reason: HOSPADM

## 2021-06-03 RX ORDER — ARIPIPRAZOLE 10 MG/1
10 TABLET ORAL NIGHTLY
Status: DISCONTINUED | OUTPATIENT
Start: 2021-06-03 | End: 2021-06-14 | Stop reason: HOSPADM

## 2021-06-03 RX ORDER — SODIUM CHLORIDE 0.9 % (FLUSH) 0.9 %
10 SYRINGE (ML) INJECTION AS NEEDED
Status: DISCONTINUED | OUTPATIENT
Start: 2021-06-03 | End: 2021-06-14 | Stop reason: HOSPADM

## 2021-06-03 RX ORDER — OMEGA-3S/DHA/EPA/FISH OIL/D3 300MG-1000
2000 CAPSULE ORAL DAILY
Status: DISCONTINUED | OUTPATIENT
Start: 2021-06-03 | End: 2021-06-14 | Stop reason: HOSPADM

## 2021-06-03 RX ORDER — METHENAMINE HIPPURATE 1000 MG/1
1 TABLET ORAL 2 TIMES DAILY WITH MEALS
Status: DISCONTINUED | OUTPATIENT
Start: 2021-06-03 | End: 2021-06-14 | Stop reason: HOSPADM

## 2021-06-03 RX ORDER — DEXTROSE MONOHYDRATE 25 G/50ML
25-50 INJECTION, SOLUTION INTRAVENOUS
Status: DISCONTINUED | OUTPATIENT
Start: 2021-06-03 | End: 2021-06-03

## 2021-06-03 RX ORDER — MODAFINIL 100 MG/1
200 TABLET ORAL DAILY
Status: DISCONTINUED | OUTPATIENT
Start: 2021-06-03 | End: 2021-06-14 | Stop reason: HOSPADM

## 2021-06-03 RX ORDER — DULOXETIN HYDROCHLORIDE 60 MG/1
60 CAPSULE, DELAYED RELEASE ORAL 2 TIMES DAILY
Status: DISCONTINUED | OUTPATIENT
Start: 2021-06-03 | End: 2021-06-14 | Stop reason: HOSPADM

## 2021-06-03 RX ORDER — SODIUM CHLORIDE 9 MG/ML
100 INJECTION, SOLUTION INTRAVENOUS CONTINUOUS
Status: DISCONTINUED | OUTPATIENT
Start: 2021-06-03 | End: 2021-06-03

## 2021-06-03 RX ORDER — DEXTROSE MONOHYDRATE 25 G/50ML
25 INJECTION, SOLUTION INTRAVENOUS
Status: DISCONTINUED | OUTPATIENT
Start: 2021-06-03 | End: 2021-06-14 | Stop reason: HOSPADM

## 2021-06-03 RX ORDER — POTASSIUM CHLORIDE 7.45 MG/ML
10 INJECTION INTRAVENOUS
Status: DISCONTINUED | OUTPATIENT
Start: 2021-06-03 | End: 2021-06-14 | Stop reason: HOSPADM

## 2021-06-03 RX ORDER — ALBUTEROL SULFATE 2.5 MG/3ML
2.5 SOLUTION RESPIRATORY (INHALATION) EVERY 4 HOURS PRN
Status: CANCELLED | OUTPATIENT
Start: 2021-06-03

## 2021-06-03 RX ORDER — BACLOFEN 10 MG/1
30 TABLET ORAL
Status: DISCONTINUED | OUTPATIENT
Start: 2021-06-03 | End: 2021-06-14 | Stop reason: HOSPADM

## 2021-06-03 RX ORDER — METHENAMINE HIPPURATE 1000 MG/1
1 TABLET ORAL 2 TIMES DAILY WITH MEALS
Status: ON HOLD | COMMUNITY
End: 2022-03-24 | Stop reason: SDUPTHER

## 2021-06-03 RX ORDER — METHENAMINE HIPPURATE 1000 MG/1
1 TABLET ORAL 2 TIMES DAILY WITH MEALS
Status: DISCONTINUED | OUTPATIENT
Start: 2021-06-03 | End: 2021-06-03

## 2021-06-03 RX ORDER — ACETAMINOPHEN 325 MG/1
650 TABLET ORAL EVERY 6 HOURS PRN
Status: DISCONTINUED | OUTPATIENT
Start: 2021-06-03 | End: 2021-06-14 | Stop reason: HOSPADM

## 2021-06-03 RX ADMIN — BACLOFEN 30 MG: 10 TABLET ORAL at 12:15

## 2021-06-03 RX ADMIN — METRONIDAZOLE 500 MG: 500 INJECTION, SOLUTION INTRAVENOUS at 04:17

## 2021-06-03 RX ADMIN — Medication 1 CAPSULE: at 08:37

## 2021-06-03 RX ADMIN — MODAFINIL 200 MG: 100 TABLET ORAL at 08:37

## 2021-06-03 RX ADMIN — CEFEPIME 2 G: 2 INJECTION, POWDER, FOR SOLUTION INTRAVENOUS at 21:56

## 2021-06-03 RX ADMIN — BACLOFEN 30 MG: 10 TABLET ORAL at 17:10

## 2021-06-03 RX ADMIN — SODIUM HYPOCHLORITE: 1.25 SOLUTION TOPICAL at 21:56

## 2021-06-03 RX ADMIN — POTASSIUM CHLORIDE 10 MEQ: 7.46 INJECTION, SOLUTION INTRAVENOUS at 08:35

## 2021-06-03 RX ADMIN — POTASSIUM CHLORIDE 10 MEQ: 7.46 INJECTION, SOLUTION INTRAVENOUS at 10:41

## 2021-06-03 RX ADMIN — METRONIDAZOLE 500 MG: 500 INJECTION, SOLUTION INTRAVENOUS at 12:19

## 2021-06-03 RX ADMIN — METRONIDAZOLE 500 MG: 500 INJECTION, SOLUTION INTRAVENOUS at 21:51

## 2021-06-03 RX ADMIN — INSULIN DETEMIR 12 UNITS: 100 INJECTION, SOLUTION SUBCUTANEOUS at 17:19

## 2021-06-03 RX ADMIN — BACLOFEN 30 MG: 10 TABLET ORAL at 21:57

## 2021-06-03 RX ADMIN — ARIPIPRAZOLE 10 MG: 10 TABLET ORAL at 21:57

## 2021-06-03 RX ADMIN — SODIUM CHLORIDE 75 ML/HR: 9 INJECTION, SOLUTION INTRAVENOUS at 04:16

## 2021-06-03 RX ADMIN — Medication 1 CAPSULE: at 17:10

## 2021-06-03 RX ADMIN — DULOXETINE HYDROCHLORIDE 60 MG: 60 CAPSULE, DELAYED RELEASE ORAL at 21:56

## 2021-06-03 RX ADMIN — GABAPENTIN 800 MG: 400 CAPSULE ORAL at 06:51

## 2021-06-03 RX ADMIN — IOPAMIDOL 90 ML: 612 INJECTION, SOLUTION INTRAVENOUS at 00:32

## 2021-06-03 RX ADMIN — GABAPENTIN 800 MG: 400 CAPSULE ORAL at 17:10

## 2021-06-03 RX ADMIN — VANCOMYCIN HYDROCHLORIDE 1500 MG: 10 INJECTION, POWDER, LYOPHILIZED, FOR SOLUTION INTRAVENOUS at 12:21

## 2021-06-03 RX ADMIN — CEFEPIME 2 G: 2 INJECTION, POWDER, FOR SOLUTION INTRAVENOUS at 05:30

## 2021-06-03 RX ADMIN — BACLOFEN 30 MG: 10 TABLET ORAL at 08:37

## 2021-06-03 RX ADMIN — INSULIN HUMAN 4 UNITS/HR: 1 INJECTION, SOLUTION INTRAVENOUS at 06:12

## 2021-06-03 RX ADMIN — CHOLECALCIFEROL TAB 10 MCG (400 UNIT) 2000 UNITS: 10 TAB at 08:37

## 2021-06-03 RX ADMIN — POTASSIUM CHLORIDE 10 MEQ: 7.46 INJECTION, SOLUTION INTRAVENOUS at 06:16

## 2021-06-03 RX ADMIN — CEFEPIME 2 G: 2 INJECTION, POWDER, FOR SOLUTION INTRAVENOUS at 14:07

## 2021-06-03 RX ADMIN — POTASSIUM CHLORIDE 10 MEQ: 7.46 INJECTION, SOLUTION INTRAVENOUS at 09:35

## 2021-06-03 RX ADMIN — ATORVASTATIN CALCIUM 10 MG: 10 TABLET, FILM COATED ORAL at 21:57

## 2021-06-03 RX ADMIN — PANTOPRAZOLE SODIUM 40 MG: 40 TABLET, DELAYED RELEASE ORAL at 08:37

## 2021-06-03 RX ADMIN — DULOXETINE HYDROCHLORIDE 60 MG: 60 CAPSULE, DELAYED RELEASE ORAL at 08:37

## 2021-06-03 RX ADMIN — SODIUM CHLORIDE, PRESERVATIVE FREE 10 ML: 5 INJECTION INTRAVENOUS at 21:51

## 2021-06-03 RX ADMIN — SODIUM CHLORIDE, PRESERVATIVE FREE 10 ML: 5 INJECTION INTRAVENOUS at 08:38

## 2021-06-03 RX ADMIN — INSULIN ASPART 8 UNITS: 100 INJECTION, SOLUTION INTRAVENOUS; SUBCUTANEOUS at 17:23

## 2021-06-03 NOTE — PLAN OF CARE
Problem: Adult Inpatient Plan of Care  Goal: Plan of Care Review  6/3/2021 1831 by Estefanía Palencia, RN  Outcome: Ongoing, Progressing  Flowsheets  Taken 6/3/2021 1831  Progress: improving  Outcome Summary: Pt is currently resting in bed with no issues. Pt complained on chest pain stat ekg and trops were collected. Pt has all items in reach, home medications were bagged to phram to use as home med. Will continue to monitor.  Taken 6/3/2021 1830  Plan of Care Reviewed With: patient  6/3/2021 1830 by Estefanía Palencia, RN  Outcome: Ongoing, Progressing  Flowsheets (Taken 6/3/2021 1830)  Progress: improving  Plan of Care Reviewed With: patient  Outcome Summary: Pt has improved with increased pain medication. Pt has all items in reach, will continue to monitor.   Goal Outcome Evaluation:  Plan of Care Reviewed With: patient  Progress: improving  Outcome Summary: Pt is currently resting in bed with no issues. Pt complained on chest pain stat ekg and trops were collected. Pt has all items in reach, home medications were bagged to phram to use as home med. Will continue to monitor.

## 2021-06-03 NOTE — ED NOTES
EKG performed by me @ 2036 and was shown to Dr. Sarkar @ 2039.     Christian Díaz  06/02/21 2052

## 2021-06-03 NOTE — NURSING NOTE
Patient is well known to wound care department.    Coccyx wound with no measurable depth on this admission. 1x1cm.  Cover dressing with moist drainage noted.  Will treat with Thera Honey and cover with silicone bordered dressing BID.  Right lower gluteal wound [id number].  Cover dressing saturated.  Bleeding noted with dressing change.  Left lower gluteal wound [id number].  Will cleanse with Dakin's, pack with damp gauze, and cover BID.       06/03/21 1115   Wound 02/08/21 1537 coccyx Pressure Injury   Placement Date/Time: 02/08/21 1537   Present on Hospital Admission: Yes  Location: coccyx  Primary Wound Type: Pressure Injury  Stage, Pressure Injury : unstageable   Dressing Appearance moist drainage   Closure None   Base moist;yellow;slough   Yellow (%), Wound Tissue Color 100   Periwound intact;redness;blanchable   Periwound Temperature warm   Periwound Skin Turgor firm   Edges rolled/closed   Wound Length (cm) 1 cm   Wound Width (cm) 1 cm   Wound Depth (cm) 0 cm   Tunneling [Depth (cm)/Location] no   Undermining [Depth (cm)/Location] no   Drainage Characteristics/Odor yellow   Drainage Amount moderate   Care, Wound cleansed with;sterile normal saline;honey applied   Dressing Care dressing changed   Periwound Care dry periwound area maintained   Wound 02/08/21 1538 Right gluteal Pressure Injury   Placement Date/Time: 02/08/21 1538   Present on Hospital Admission: Yes  Side: Right  Location: gluteal  Primary Wound Type: Pressure Injury  Stage, Pressure Injury : Stage 4   Dressing Appearance moist drainage   Closure None   Base red;yellow;slough;bleeding   Red (%), Wound Tissue Color 90   Yellow (%), Wound Tissue Color 10   Periwound intact;redness;blanchable   Periwound Temperature warm   Periwound Skin Turgor soft   Edges open   Wound Length (cm) 2 cm   Wound Width (cm) 5 cm   Wound Depth (cm) 9 cm   Tunneling [Depth (cm)/Location] no   Undermining [Depth (cm)/Location] no   Drainage Characteristics/Odor yellow;creamy    Drainage Amount large   Care, Wound sterile normal saline;irrigated with   Dressing Care gauze, [id number];dressing changed   Periwound Care dry periwound area maintained   Wound 02/08/21 1539 Left gluteal Pressure Injury   Placement Date/Time: 02/08/21 1539   Present on Hospital Admission: Yes  Side: Left  Location: gluteal  Primary Wound Type: Pressure Injury  Stage, Pressure Injury : Stage 4   Dressing Appearance moist drainage   Closure None   Base red;yellow;slough;moist   Red (%), Wound Tissue Color 80   Yellow (%), Wound Tissue Color 20   Periwound intact;redness;blanchable   Periwound Temperature warm   Periwound Skin Turgor soft   Edges open   Wound Length (cm) 6 cm   Wound Width (cm) 3 cm   Wound Depth (cm) 7 cm   Tunneling [Depth (cm)/Location] no   Undermining [Depth (cm)/Location] no   Drainage Characteristics/Odor yellow;creamy   Drainage Amount moderate   Care, Wound sterile normal saline;irrigated with   Dressing Care gauze, [id number];dressing changed   Periwound Care dry periwound area maintained
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
## 2021-06-03 NOTE — PROGRESS NOTES
-Patient seen and examined reviewed his condition was admitted earlier this a.m. for sepsis, severe hyperglycemia required to be on insulin drip.  He is feeling himself better.  Urine output has been excellent.  Is been evaluated in the past for pelvic/tuberosity osteomyelitis  -Is known to have chronic wound      --DC IV insulin/IV fluids, start on Levemir 12 units  --Keep holding p.o. apixaban, pending surgical evaluation for the decubital wound  --Continue cefepime/vancomycin/Flagyl, follow-up on the urine culture  --MRI of the pelvis area  --Transfer patient to [facility]

## 2021-06-03 NOTE — PROGRESS NOTES
Pharmacy to dose Vancomycin for sepsis:  Wt = 131kg  CrCl = 212.1.  Dosed as follows: Vancomycin 2000mg iv x 1 then 1500mg iv q12h.  Pharmacy will monitor and adjust dosing to maintain AUC of 400-600.     Erick Stack RPH  06:04 EDT  06/03/21

## 2021-06-03 NOTE — CONSULTS
"Diabetes Education  Assessment/Teaching    Patient Name:  Ken Krueger  YOB: 1975  MRN: [id number]  Admit Date:  6/2/2021      Assessment Date:  6/3/2021    Most Recent Value   General Information    Height  180.3 cm (71\")   Height Method  Stated   Weight  131 kg (289 lb)   Weight Method  Bed scale   Pregnancy Assessment   Diabetes History   Education Preferences   Nutrition Information   Assessment Topics   DM Goals            Most Recent Value   DM Education Needs   Meter  Has own   Frequency of Testing  3 times a day   Medication  Insulin, Oral   Problem Solving  Hypoglycemia, Hyperglycemia, Sick days, Signs, Symptoms, Treatment   Reducing Risks  Cardiovascular, Immunizations, Foot care, Dental exam, Eye exam, Blood pressure, Lipids, A1C testing, Neuropathy, Retinopathy   Healthy Eating  Basic meal plan provided   Physical Activity  Other (comment) [unable to due to being a paraplegic.]   Physical Activity Frequency  Other (comment) [unable to due to being a paraplegic]   Healthy Coping  Appropriate   Discharge Plan  Home, Follow-up with PCP   Motivation  Moderate   Teaching Method  Explanation, Discussion, Handouts   Patient Response  Verbalized understanding            Other Comments:  A1C 10.8. Patient did not wear a mask. Patient received education and ADA handouts on diet, activity, blood glucose monitoring, taking medication as prescribed, having someone check his feet daily, S/S of hypo/hyperglycemia. Patient stated that it is hard due to his condition to lower his blood glucose. Patient stated that he will try a little more to lower his blood glucose and to eat healthy. Patient was given office contact number for         Electronically signed by:  Cary Kaur RN  06/03/21 12:48 EDT  "

## 2021-06-03 NOTE — PLAN OF CARE
Problem: Adult Inpatient Plan of Care  Goal: Plan of Care Review  Outcome: Ongoing, Progressing  Goal: Patient-Specific Goal (Individualized)  Outcome: Ongoing, Progressing  Goal: Absence of Hospital-Acquired Illness or Injury  Outcome: Ongoing, Progressing  Intervention: Prevent Skin Injury  Recent Flowsheet Documentation  Taken 6/3/2021 0246 by Lisa Goode RN  Body Position: supine  Skin Protection: adhesive use limited  Intervention: Prevent and Manage VTE (venous thromboembolism) Risk  Recent Flowsheet Documentation  Taken 6/3/2021 0246 by Lisa Goode RN  VTE Prevention/Management: (Left above the knee amputee) other (see comments)  Goal: Optimal Comfort and Wellbeing  Outcome: Ongoing, Progressing  Intervention: Provide Person-Centered Care  Recent Flowsheet Documentation  Taken 6/3/2021 0246 by Lisa Goode RN  Trust Relationship/Rapport: care explained  Goal: Readiness for Transition of Care  Outcome: Ongoing, Progressing     Problem: Skin Injury Risk Increased  Goal: Skin Health and Integrity  Outcome: Ongoing, Progressing  Intervention: Optimize Skin Protection  Recent Flowsheet Documentation  Taken 6/3/2021 0246 by Lisa Goode RN  Pressure Reduction Techniques: pressure points protected  Head of Bed (HOB): HOB at 30-45 degrees  Pressure Reduction Devices: (Bariatric bed)   heel offloading device utilized   specialty bed utilized  Skin Protection: adhesive use limited  Intervention: Promote and Optimize Oral Intake  Recent Flowsheet Documentation  Taken 6/3/2021 0246 by Lisa Goode RN  Oral Nutrition Promotion: rest periods promoted     Problem: Diabetes Comorbidity  Goal: Blood Glucose Level Within Desired Range  Outcome: Ongoing, Progressing  Intervention: Maintain Glycemic Control  Recent Flowsheet Documentation  Taken 6/3/2021 0246 by Lisa Goode RN  Glycemic Management: blood glucose monitoring     Problem: Pain Chronic (Persistent) (Comorbidity Management)  Goal:  Acceptable Pain Control and Functional Ability  Outcome: Ongoing, Progressing  Intervention: Optimize Psychosocial Wellbeing  Recent Flowsheet Documentation  Taken 6/3/2021 0246 by Lisa Goode, RN  Diversional Activities:   television   smartphone  Family/Support System Care: self-care encouraged     Problem: Hypertension Comorbidity  Goal: Blood Pressure in Desired Range  Outcome: Ongoing, Progressing   Goal Outcome Evaluation:

## 2021-06-03 NOTE — PLAN OF CARE
Goal Outcome Evaluation:  Plan of Care Reviewed With: patient  Progress: improving  Outcome Summary: Pt has improved with increased pain medication. Pt has all items in reach, will continue to monitor.
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
## 2021-06-03 NOTE — CASE MANAGEMENT/SOCIAL WORK
Discharge Planning Assessment   Fabricio     Patient Name: Ken Krueger  MRN: [id number]  Today's Date: 6/3/2021    Admit Date: 6/2/2021    Discharge Needs Assessment     Row Name 06/03/21 1221       Living Environment    Lives With  sibling(s)    Name(s) of Who Lives With Patient  Lives with Pineda álvarez    Current Living Arrangements  home/apartment/condo    Primary Care Provided by  homecare agency    Provides Primary Care For  no one, unable/limited ability to care for self    Quality of Family Relationships  helpful;involved;supportive    Able to Return to Prior Arrangements  yes       Transition Planning    Patient/Family Anticipates Transition to  home with help/services;home with family    Patient/Family Anticipated Services at Transition  home health care    Transportation Anticipated  family or friend will provide       Discharge Needs Assessment    Equipment Currently Used at Home  wheelchair, motorized;lift device;hospital bed    Outpatient/Agency/Support Group Needs  homecare agency    Discharge Facility/Level of Care Needs  home with home health        Discharge Plan     Row Name 06/03/21 1222       Plan    Plan SS spoke with pt's POA/brotherPineda. Pt lives at home with Pineda. Pt utilizes [facility], to be notified and sent new HH order at d/c. Pt has hospital bed, motorized wheelchair, and lift device from unknown company. PCP is Franchesca Hodges. Pt's POA is Pineda álvarez, no living will. Pt's brother will provide transportation home at d/c. SS to follow and assist.    Patient/Family in Agreement with Plan  yes        Demographic Summary     Row Name 06/03/21 1221       General Information    Referral Source  nursing    Reason for Consult  -- D/C planning pt may need HH. He has had [facility] in the past        BRENT Mckenzie
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
## 2021-06-04 ENCOUNTER — APPOINTMENT (OUTPATIENT)
Dept: MRI IMAGING | Facility: HOSPITAL | Age: 46
End: 2021-06-04

## 2021-06-04 LAB
ANION GAP SERPL CALCULATED.3IONS-SCNC: 12 MMOL/L (ref 5–15)
BACTERIA BLD CULT: ABNORMAL
BACTERIA SPEC AEROBE CULT: ABNORMAL
BUN SERPL-MCNC: 5 MG/DL (ref 6–20)
BUN/CREAT SERPL: 8.6 (ref 7–25)
CALCIUM SPEC-SCNC: 8.1 MG/DL (ref 8.6–10.5)
CHLORIDE SERPL-SCNC: 99 MMOL/L (ref 98–107)
CO2 SERPL-SCNC: 21 MMOL/L (ref 22–29)
CREAT SERPL-MCNC: 0.58 MG/DL (ref 0.76–1.27)
DEPRECATED RDW RBC AUTO: 43.2 FL (ref 37–54)
ERYTHROCYTE [DISTWIDTH] IN BLOOD BY AUTOMATED COUNT: 15.6 % (ref 12.3–15.4)
GFR SERPL CREATININE-BSD FRML MDRD: >150 ML/MIN/1.73
GLUCOSE BLDC GLUCOMTR-MCNC: 287 MG/DL (ref 70–130)
GLUCOSE BLDC GLUCOMTR-MCNC: 308 MG/DL (ref 70–130)
GLUCOSE BLDC GLUCOMTR-MCNC: 366 MG/DL (ref 70–130)
GLUCOSE BLDC GLUCOMTR-MCNC: 383 MG/DL (ref 70–130)
GLUCOSE SERPL-MCNC: 336 MG/DL (ref 65–99)
HCT VFR BLD AUTO: 29.6 % (ref 37.5–51)
HGB BLD-MCNC: 8.2 G/DL (ref 13–17.7)
MAGNESIUM SERPL-MCNC: 1.4 MG/DL (ref 1.6–2.6)
MCH RBC QN AUTO: 21.4 PG (ref 26.6–33)
MCHC RBC AUTO-ENTMCNC: 27.7 G/DL (ref 31.5–35.7)
MCV RBC AUTO: 77.1 FL (ref 79–97)
PLATELET # BLD AUTO: 503 10*3/MM3 (ref 140–450)
PMV BLD AUTO: 9.6 FL (ref 6–12)
POTASSIUM SERPL-SCNC: 3.9 MMOL/L (ref 3.5–5.2)
RBC # BLD AUTO: 3.84 10*6/MM3 (ref 4.14–5.8)
SODIUM SERPL-SCNC: 132 MMOL/L (ref 136–145)
VANCOMYCIN TROUGH SERPL-MCNC: 8.6 MCG/ML (ref 5–20)
WBC # BLD AUTO: 7.73 10*3/MM3 (ref 3.4–10.8)

## 2021-06-04 PROCEDURE — 63710000001 INSULIN ASPART PER 5 UNITS: Performed by: PHYSICIAN ASSISTANT

## 2021-06-04 PROCEDURE — 25010000002 MAGNESIUM SULFATE IN D5W 1G/100ML (PREMIX) 1-5 GM/100ML-% SOLUTION: Performed by: INTERNAL MEDICINE

## 2021-06-04 PROCEDURE — 80048 BASIC METABOLIC PNL TOTAL CA: CPT | Performed by: INTERNAL MEDICINE

## 2021-06-04 PROCEDURE — 25010000002 CEFEPIME PER 500 MG: Performed by: INTERNAL MEDICINE

## 2021-06-04 PROCEDURE — 82962 GLUCOSE BLOOD TEST: CPT

## 2021-06-04 PROCEDURE — 72195 MRI PELVIS W/O DYE: CPT

## 2021-06-04 PROCEDURE — 72195 MRI PELVIS W/O DYE: CPT | Performed by: RADIOLOGY

## 2021-06-04 PROCEDURE — 99233 SBSQ HOSP IP/OBS HIGH 50: CPT | Performed by: INTERNAL MEDICINE

## 2021-06-04 PROCEDURE — 25010000002 ENOXAPARIN PER 10 MG: Performed by: INTERNAL MEDICINE

## 2021-06-04 PROCEDURE — 93010 ELECTROCARDIOGRAM REPORT: CPT | Performed by: INTERNAL MEDICINE

## 2021-06-04 PROCEDURE — 83735 ASSAY OF MAGNESIUM: CPT | Performed by: INTERNAL MEDICINE

## 2021-06-04 PROCEDURE — 63710000001 INSULIN ASPART PER 5 UNITS: Performed by: INTERNAL MEDICINE

## 2021-06-04 PROCEDURE — 63710000001 INSULIN DETEMIR PER 5 UNITS: Performed by: INTERNAL MEDICINE

## 2021-06-04 PROCEDURE — 85027 COMPLETE CBC AUTOMATED: CPT | Performed by: INTERNAL MEDICINE

## 2021-06-04 PROCEDURE — 80202 ASSAY OF VANCOMYCIN: CPT | Performed by: INTERNAL MEDICINE

## 2021-06-04 PROCEDURE — 25010000002 VANCOMYCIN 5 G RECONSTITUTED SOLUTION: Performed by: INTERNAL MEDICINE

## 2021-06-04 PROCEDURE — 87040 BLOOD CULTURE FOR BACTERIA: CPT | Performed by: INTERNAL MEDICINE

## 2021-06-04 PROCEDURE — 93005 ELECTROCARDIOGRAM TRACING: CPT | Performed by: INTERNAL MEDICINE

## 2021-06-04 RX ORDER — BUMETANIDE 0.25 MG/ML
2.5 INJECTION INTRAMUSCULAR; INTRAVENOUS ONCE
Status: COMPLETED | OUTPATIENT
Start: 2021-06-04 | End: 2021-06-04

## 2021-06-04 RX ORDER — MAGNESIUM SULFATE 1 G/100ML
1 INJECTION INTRAVENOUS
Status: COMPLETED | OUTPATIENT
Start: 2021-06-04 | End: 2021-06-04

## 2021-06-04 RX ORDER — METOPROLOL SUCCINATE 50 MG/1
50 TABLET, EXTENDED RELEASE ORAL
Status: DISCONTINUED | OUTPATIENT
Start: 2021-06-04 | End: 2021-06-08

## 2021-06-04 RX ADMIN — ENOXAPARIN SODIUM 120 MG: 60 INJECTION SUBCUTANEOUS at 12:13

## 2021-06-04 RX ADMIN — GABAPENTIN 800 MG: 400 CAPSULE ORAL at 00:02

## 2021-06-04 RX ADMIN — MODAFINIL 200 MG: 100 TABLET ORAL at 09:47

## 2021-06-04 RX ADMIN — SODIUM HYPOCHLORITE: 1.25 SOLUTION TOPICAL at 23:21

## 2021-06-04 RX ADMIN — BUMETANIDE 2.5 MG: 0.25 INJECTION, SOLUTION INTRAMUSCULAR; INTRAVENOUS at 16:14

## 2021-06-04 RX ADMIN — DULOXETINE HYDROCHLORIDE 60 MG: 60 CAPSULE, DELAYED RELEASE ORAL at 20:43

## 2021-06-04 RX ADMIN — METHENAMINE HIPPURATE 1 G: 1 TABLET ORAL at 17:22

## 2021-06-04 RX ADMIN — PANTOPRAZOLE SODIUM 40 MG: 40 TABLET, DELAYED RELEASE ORAL at 09:47

## 2021-06-04 RX ADMIN — GABAPENTIN 800 MG: 400 CAPSULE ORAL at 16:14

## 2021-06-04 RX ADMIN — INSULIN ASPART 6 UNITS: 100 INJECTION, SOLUTION INTRAVENOUS; SUBCUTANEOUS at 17:21

## 2021-06-04 RX ADMIN — MAGNESIUM SULFATE HEPTAHYDRATE 1 G: 1 INJECTION, SOLUTION INTRAVENOUS at 14:54

## 2021-06-04 RX ADMIN — Medication 1 CAPSULE: at 09:47

## 2021-06-04 RX ADMIN — CHOLECALCIFEROL TAB 10 MCG (400 UNIT) 2000 UNITS: 10 TAB at 09:46

## 2021-06-04 RX ADMIN — MAGNESIUM SULFATE HEPTAHYDRATE 1 G: 1 INJECTION, SOLUTION INTRAVENOUS at 16:14

## 2021-06-04 RX ADMIN — CEFEPIME 2 G: 2 INJECTION, POWDER, FOR SOLUTION INTRAVENOUS at 12:13

## 2021-06-04 RX ADMIN — ARIPIPRAZOLE 10 MG: 10 TABLET ORAL at 20:43

## 2021-06-04 RX ADMIN — DULOXETINE HYDROCHLORIDE 60 MG: 60 CAPSULE, DELAYED RELEASE ORAL at 09:47

## 2021-06-04 RX ADMIN — BACLOFEN 30 MG: 10 TABLET ORAL at 20:43

## 2021-06-04 RX ADMIN — MAGNESIUM SULFATE HEPTAHYDRATE 1 G: 1 INJECTION, SOLUTION INTRAVENOUS at 17:22

## 2021-06-04 RX ADMIN — METOPROLOL SUCCINATE 50 MG: 50 TABLET, EXTENDED RELEASE ORAL at 09:52

## 2021-06-04 RX ADMIN — INSULIN ASPART 7 UNITS: 100 INJECTION, SOLUTION INTRAVENOUS; SUBCUTANEOUS at 12:10

## 2021-06-04 RX ADMIN — BACLOFEN 30 MG: 10 TABLET ORAL at 09:47

## 2021-06-04 RX ADMIN — INSULIN DETEMIR 24 UNITS: 100 INJECTION, SOLUTION SUBCUTANEOUS at 12:11

## 2021-06-04 RX ADMIN — VANCOMYCIN HYDROCHLORIDE 1500 MG: 10 INJECTION, POWDER, LYOPHILIZED, FOR SOLUTION INTRAVENOUS at 00:55

## 2021-06-04 RX ADMIN — INSULIN ASPART 10 UNITS: 100 INJECTION, SOLUTION INTRAVENOUS; SUBCUTANEOUS at 22:58

## 2021-06-04 RX ADMIN — INSULIN ASPART 8 UNITS: 100 INJECTION, SOLUTION INTRAVENOUS; SUBCUTANEOUS at 09:46

## 2021-06-04 RX ADMIN — METHENAMINE HIPPURATE 1 G: 1 TABLET ORAL at 09:49

## 2021-06-04 RX ADMIN — BACLOFEN 30 MG: 10 TABLET ORAL at 12:10

## 2021-06-04 RX ADMIN — GABAPENTIN 800 MG: 400 CAPSULE ORAL at 20:43

## 2021-06-04 RX ADMIN — SODIUM HYPOCHLORITE: 1.25 SOLUTION TOPICAL at 12:11

## 2021-06-04 RX ADMIN — SODIUM CHLORIDE, PRESERVATIVE FREE 10 ML: 5 INJECTION INTRAVENOUS at 09:48

## 2021-06-04 RX ADMIN — ATORVASTATIN CALCIUM 10 MG: 10 TABLET, FILM COATED ORAL at 20:43

## 2021-06-04 RX ADMIN — BACLOFEN 30 MG: 10 TABLET ORAL at 17:22

## 2021-06-04 RX ADMIN — PANTOPRAZOLE SODIUM 40 MG: 40 TABLET, DELAYED RELEASE ORAL at 20:43

## 2021-06-04 RX ADMIN — GABAPENTIN 800 MG: 400 CAPSULE ORAL at 09:48

## 2021-06-04 RX ADMIN — CEFEPIME 2 G: 2 INJECTION, POWDER, FOR SOLUTION INTRAVENOUS at 20:43

## 2021-06-04 RX ADMIN — CEFEPIME 2 G: 2 INJECTION, POWDER, FOR SOLUTION INTRAVENOUS at 05:44

## 2021-06-04 RX ADMIN — METRONIDAZOLE 500 MG: 500 INJECTION, SOLUTION INTRAVENOUS at 03:10
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
## 2021-06-04 NOTE — PLAN OF CARE
Problem: Adult Inpatient Plan of Care  Goal: Plan of Care Review  Outcome: Ongoing, Progressing  Flowsheets (Taken 6/3/2021 1831 by Estefanía Palencia RN)  Outcome Summary: Pt is currently resting in bed with no issues. Pt complained on chest pain stat ekg and trops were collected. Pt has all items in reach, home medications were bagged to phram to use as home med. Will continue to monitor.  Goal: Patient-Specific Goal (Individualized)  Outcome: Ongoing, Progressing  Goal: Absence of Hospital-Acquired Illness or Injury  Outcome: Ongoing, Progressing  Intervention: Identify and Manage Fall Risk  Recent Flowsheet Documentation  Taken 6/4/2021 0100 by Lisa Goode RN  Safety Promotion/Fall Prevention: safety round/check completed  Taken 6/3/2021 1959 by Lisa Goode RN  Safety Promotion/Fall Prevention: activity supervised  Intervention: Prevent Skin Injury  Recent Flowsheet Documentation  Taken 6/3/2021 1959 by Lisa Goode, RN  Skin Protection: adhesive use limited  Goal: Optimal Comfort and Wellbeing  Outcome: Ongoing, Progressing  Intervention: Provide Person-Centered Care  Recent Flowsheet Documentation  Taken 6/3/2021 1959 by Lisa Goode, SHAHNAZ  Trust Relationship/Rapport: care explained  Goal: Readiness for Transition of Care  Outcome: Ongoing, Progressing     Problem: Skin Injury Risk Increased  Goal: Skin Health and Integrity  Outcome: Ongoing, Progressing  Intervention: Optimize Skin Protection  Recent Flowsheet Documentation  Taken 6/3/2021 1959 by Lisa Goode RN  Pressure Reduction Techniques: pressure points protected  Head of Bed (HOB): HOB at 30-45 degrees  Skin Protection: adhesive use limited  Intervention: Promote and Optimize Oral Intake  Recent Flowsheet Documentation  Taken 6/3/2021 1959 by Lisa Goode, RN  Oral Nutrition Promotion: rest periods promoted     Problem: Diabetes Comorbidity  Goal: Blood Glucose Level Within Desired Range  Outcome: Ongoing,  Progressing  Intervention: Maintain Glycemic Control  Recent Flowsheet Documentation  Taken 6/3/2021 1959 by Lisa Goode, RN  Glycemic Management: blood glucose monitoring     Problem: Heart Failure Comorbidity  Goal: Maintenance of Heart Failure Symptom Control  Outcome: Ongoing, Progressing     Problem: Hypertension Comorbidity  Goal: Blood Pressure in Desired Range  Outcome: Ongoing, Progressing     Problem: Pain Chronic (Persistent) (Comorbidity Management)  Goal: Acceptable Pain Control and Functional Ability  Outcome: Ongoing, Progressing  Intervention: Optimize Psychosocial Wellbeing  Recent Flowsheet Documentation  Taken 6/3/2021 1959 by Lisa Goode, RN  Supportive Measures: active listening utilized  Diversional Activities:   television   smartphone  Family/Support System Care: self-care encouraged   Goal Outcome Evaluation:
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
## 2021-06-04 NOTE — PLAN OF CARE
Goal Outcome Evaluation:  Plan of Care Reviewed With: patient          VS stable, afebrile.  Patient on sand bed for management of decubitus ulcers.  Receives IV antibiotics.  Bed alarm in place for safety.        Problem: Adult Inpatient Plan of Care  Goal: Plan of Care Review  Outcome: Ongoing, Progressing  Flowsheets (Taken 6/4/2021 1448)  Plan of Care Reviewed With: patient  Goal: Patient-Specific Goal (Individualized)  Outcome: Ongoing, Progressing  Goal: Absence of Hospital-Acquired Illness or Injury  Outcome: Ongoing, Progressing  Intervention: Identify and Manage Fall Risk  Description: Perform standard risk assessment with a validated tool or comprehensive approach appropriate to the patient on admission; reassess fall risk frequently, with change in status or transfer to another level of care.  Communicate fall injury risk to interprofessional healthcare team.  Determine need for increased observation, equipment and environmental modification, such as low bed and signage, as well as supportive, nonskid footwear.  Adjust safety measures to individual developmental age, stage and identified risk factors.  Reinforce the importance of safety and physical activity with patient and family.  Perform regular intentional rounding to assess need for position change, pain assessment, personal needs, including assistance with toileting.  Recent Flowsheet Documentation  Taken 6/4/2021 1300 by Estefanía Soto RN  Safety Promotion/Fall Prevention: safety round/check completed  Taken 6/4/2021 1100 by Estefanía Soto RN  Safety Promotion/Fall Prevention: safety round/check completed  Taken 6/4/2021 0745 by Estefanía Soto RN  Safety Promotion/Fall Prevention: safety round/check completed  Taken 6/4/2021 0700 by Estefanía Soto RN  Safety Promotion/Fall Prevention: safety round/check completed  Intervention: Prevent and Manage VTE (venous thromboembolism) Risk  Description: Assess for VTE risk.  Encourage/assist with  early ambulation.  Initiate and maintain compression or other therapy, as indicated based on identified risk in accordance with organizational protocol/provider order.  Encourage both active and passive leg exercises while in bed, if unable to ambulate.  Recent Flowsheet Documentation  Taken 6/4/2021 0745 by Estefanía Soto RN  VTE Prevention/Management: (See MAR) other (see comments)  Goal: Optimal Comfort and Wellbeing  Outcome: Ongoing, Progressing  Intervention: Provide Person-Centered Care  Description: Use a family-focused approach to care.  Develop trust and rapport by proactively providing information, encouraging questions, addressing concerns and offering reassurance.  Acknowledge emotional response to hospitalization.  Recognize and utilize personal coping strategies.  Honor spiritual and cultural preferences.  Recent Flowsheet Documentation  Taken 6/4/2021 0745 by Estefanía Soto RN  Trust Relationship/Rapport:   care explained   choices provided   questions answered   reassurance provided   thoughts/feelings acknowledged  Goal: Readiness for Transition of Care  Outcome: Ongoing, Progressing     Problem: Skin Injury Risk Increased  Goal: Skin Health and Integrity  Outcome: Ongoing, Progressing  Intervention: Optimize Skin Protection  Description: Reassess skin injury risk and inspect skin frequently (e.g., scheduled interval, with turning, with change in condition) to provide optimal prevention.  Maintain adequate tissue perfusion (e.g., encourage fluid balance, avoid crossing legs, constrictive clothing or devices) to promote tissue oxygenation.  Maintain head of bed at lowest degree of elevation tolerated, considering medical condition and other restrictions. Limit amount of time head of bed is elevated greater than 30 degrees to prevent friction/shear injury.  Avoid positioning onto an area that remains reddened.  Minimize incontinence and moisture (e.g., toileting schedule; moisture-wicking pad,  diaper or incontinence collection device, skin moisture barrier).  Cleanse skin promptly and gently when soiled utilizing a pH-balanced cleanser.  Relieve and redistribute pressure (e.g., schedule position changes; utilize higher specification foam mattress, chair cushion, constant low-pressure or alternating-pressure support surface; medical device repositioning; protective dressing applicatio  Support increased progressive functional activity (e.g., therapeutic exercise) to decrease risk associated with immobility. Balance rest with activity.  Recent Flowsheet Documentation  Taken 6/4/2021 0745 by Estefanía Soto RN  Pressure Reduction Techniques: weight shift assistance provided     Problem: Diabetes Comorbidity  Goal: Blood Glucose Level Within Desired Range  Outcome: Ongoing, Progressing  Intervention: Maintain Glycemic Control  Description: Establish target blood glucose levels based on patient-specific factors such as age, developmental stage and illness severity.  Document blood glucose levels and monitor trend; advocate for treatment if not within desired range.  Provide pharmacologic therapy to maintain glycemic control.  Advocate for correctional doses if blood glucose level is above targeted blood glucose level; match insulin dose to carbohydrate intake to avoid elevated postprandial blood glucose level.  Establish and follow a plan to identify and treat hypoglycemia.  Avoid hypoglycemic episodes by adjusting insulin dose to change in condition (e.g., illness severity, decreased oral intake, missed or refused meals/snacks or medication change, such as steroid taper).  Identify potential cause in event of a decreased blood glucose level.  Recent Flowsheet Documentation  Taken 6/4/2021 0745 by Estefanía Soto RN  Glycemic Management: blood glucose monitoring     Problem: Heart Failure Comorbidity  Goal: Maintenance of Heart Failure Symptom Control  Outcome: Ongoing, Progressing  Intervention: Maintain  Heart Failure-Management Strategies  Description: Evaluate adherence to home heart failure self-care regimen (e.g., medication, fluid balance, sodium intake, daily weight, physical activity, telemonitoring, support).  Advocate continuation of home medication and schedule.  Consider pharmacologic therapy administration time and effects (e.g., avoid giving diuretic prior to bedtime or nitrates on empty stomach).  Monitor response to pharmacologic therapy (e.g., weight fluctuations, blood pressure, electrolyte level).  Monitor for signs and symptoms of anxiety and depression, including severity and duration; if present, provide psychosocial support.  Consider need for palliative care consult.  Recent Flowsheet Documentation  Taken 6/4/2021 1300 by Estefanía Soto RN  Medication Review/Management: medications reviewed  Taken 6/4/2021 1100 by Estefanía Soto RN  Medication Review/Management: medications reviewed  Taken 6/4/2021 0745 by Estefanía Soto RN  Medication Review/Management: medications reviewed  Taken 6/4/2021 0700 by Estefanía Stoo RN  Medication Review/Management: medications reviewed     Problem: Hypertension Comorbidity  Goal: Blood Pressure in Desired Range  Outcome: Ongoing, Progressing  Intervention: Maintain Hypertension-Management Strategies  Description: Evaluate adherence to home antihypertensive regimen (e.g., exercise and activity, diet modification, medication).  Provide scheduled antihypertensive medication; consider administration time and effects (e.g., avoid giving diuretic prior to bedtime).  Monitor response to medication therapy (e.g., blood pressure, electrolyte level, medication effects).  Minimize risk of orthostatic hypotension; encourage caution with position changes, particularly if elderly  Recent Flowsheet Documentation  Taken 6/4/2021 1300 by Estefanía Soto RN  Medication Review/Management: medications reviewed  Taken 6/4/2021 1100 by Estefanía Soto RN  Medication  Review/Management: medications reviewed  Taken 6/4/2021 0745 by Estefanía Soto RN  Medication Review/Management: medications reviewed  Taken 6/4/2021 0700 by Estefanía Soto RN  Medication Review/Management: medications reviewed     Problem: Pain Chronic (Persistent) (Comorbidity Management)  Goal: Acceptable Pain Control and Functional Ability  Outcome: Ongoing, Progressing  Intervention: Manage Persistent Pain  Description: Evaluate pain level, effect of treatment and patient response at regular intervals.  Note: Response to pain may diminish over time. This does not indicate that pain is absent.  Minimize pain stimuli; coordinate care and adjust environment (e.g., light, noise, unnecessary movement); promote sleep/rest.  Match pharmacologic analgesia to severity and type of pain mechanism (e.g., neuropathic, muscle, inflammatory); consider multimodal approach (e.g., nonopioid, opioid, adjuvant).  Provide medication at regular intervals; titrate to patient response.  Manage breakthrough pain with additional doses; consider rotation or switching medication.  Monitor for signs of substance tolerance (increased dose to reach desired effect, decreased effect with same dose).  Avoid abrupt withdrawal of medication, especially agents capable of causing physical dependence.  Manage medication-induced effects, such as constipation, nausea, urinary retention, somnolence and dizziness.  Consider nonpharmacologic pain interventions to improve function (e.g., massage, transcutaneous electrical nerve stimulation, vibration, heat or cold application, immobilization, hydrotherapy).  Consider addition of complementary or alternative therapy, such as acupuncture, hypnosis or therapeutic touch.  Recent Flowsheet Documentation  Taken 6/4/2021 1300 by Estefanía Soto RN  Medication Review/Management: medications reviewed  Taken 6/4/2021 1100 by Estefanía Soto RN  Medication Review/Management: medications reviewed  Taken  6/4/2021 0745 by Estefanía Soto RN  Medication Review/Management: medications reviewed  Taken 6/4/2021 0700 by Estefanía Soto RN  Medication Review/Management: medications reviewed  Intervention: Optimize Psychosocial Wellbeing  Description: Facilitate patient’s self-control over pain by providing pain information and allowing choices in treatment.  Consider and address emotional response to pain.  Explore and promote use of coping strategies; address barriers to successful coping.  Evaluate and assist with psychosocial, cultural and spiritual factors impacting pain.  Modify pain perception by using cognitive-behavioral techniques, such as distraction, guided imagery, meditation, relaxation or music.  Recent Flowsheet Documentation  Taken 6/4/2021 0745 by Estefanía Soto RN  Supportive Measures: active listening utilized  Diversional Activities:   smartphone   television  Family/Support System Care:   self-care encouraged   support provided     Problem: Fall Injury Risk  Goal: Absence of Fall and Fall-Related Injury  Outcome: Ongoing, Progressing  Intervention: Identify and Manage Contributors to Fall Injury Risk  Description: Reassess fall risk frequently and with change in status or transfer to another level of care.  Communicate fall injury risk to all healthcare team members (e.g., rounds, change of shift/provider, patient transport).  Anticipate needs; perform regular intentional rounding to assess need for position change, pain assessment, personal needs (e.g., toileting) and placement of necessary items.  Provide reorientation, appropriate sensory stimulation and routines with changes in mental status to decrease risk of fall.  Promote use of personal vision and auditory aids (e.g., glasses, hearing aids).  Assess assistance level required for safe and effective care; provide support as needed (e.g., toileting, bathing, mobilization).  Define behavior and activity limits to patient and family.  If fall  occurs, assess for and treat injury; determine cause; revise fall injury prevention plan.  Regularly review medication contribution to fall risk; adjust medication administration times to minimize risk of falling.  Consider risk related to polypharmacy and age.  Balance adequate pain management with potential for oversedation.  Recent Flowsheet Documentation  Taken 6/4/2021 1300 by Estefanía Soto RN  Medication Review/Management: medications reviewed  Taken 6/4/2021 1100 by Estefanía Soto RN  Medication Review/Management: medications reviewed  Taken 6/4/2021 0745 by Estefanía Soto RN  Medication Review/Management: medications reviewed  Taken 6/4/2021 0700 by Estefanía Soto RN  Medication Review/Management: medications reviewed  Intervention: Promote Injury-Free Environment  Description: Provide a safe, barrier-free environment that encourages independent activity.  Keep care area uncluttered and well-lighted.  Determine need for increased observation or auditory alerts (e.g., bed or chair alarm).  Assess equipment and environmental modification needs (e.g., low bed, signage, nonskid footwear, grab bars).  Avoid use of restraints.  Recent Flowsheet Documentation  Taken 6/4/2021 1300 by Estefanía Soto RN  Safety Promotion/Fall Prevention: safety round/check completed  Taken 6/4/2021 1100 by Estefanía Soto RN  Safety Promotion/Fall Prevention: safety round/check completed  Taken 6/4/2021 0745 by Estefanía Soto RN  Safety Promotion/Fall Prevention: safety round/check completed  Taken 6/4/2021 0700 by Estefanía Soto RN  Safety Promotion/Fall Prevention: safety round/check completed

## 2021-06-04 NOTE — PROGRESS NOTES
Discharge Planning Assessment   Fabricio     Patient Name: Ken Krueger  MRN: [id number]  Today's Date: 6/4/2021    Admit Date: 6/2/2021    Discharge Plan     Row Name 06/04/21 1116       Plan    Plan  Pt was admitted on 6/2/21. Pt plans to return home at discharge. Pt's brother will provide transport at discharge. SS will continue to follow and assist with discharge planning.    Patient/Family in Agreement with Plan  yes           JOSE Meyers

## 2021-06-05 LAB
ANION GAP SERPL CALCULATED.3IONS-SCNC: 10.3 MMOL/L (ref 5–15)
BACTERIA SPEC AEROBE CULT: ABNORMAL
BUN SERPL-MCNC: 9 MG/DL (ref 6–20)
BUN/CREAT SERPL: 13.8 (ref 7–25)
CALCIUM SPEC-SCNC: 8.4 MG/DL (ref 8.6–10.5)
CHLORIDE SERPL-SCNC: 98 MMOL/L (ref 98–107)
CO2 SERPL-SCNC: 24.7 MMOL/L (ref 22–29)
CREAT SERPL-MCNC: 0.65 MG/DL (ref 0.76–1.27)
DEPRECATED RDW RBC AUTO: 44.8 FL (ref 37–54)
ERYTHROCYTE [DISTWIDTH] IN BLOOD BY AUTOMATED COUNT: 15.7 % (ref 12.3–15.4)
GFR SERPL CREATININE-BSD FRML MDRD: 134 ML/MIN/1.73
GLUCOSE BLDC GLUCOMTR-MCNC: 256 MG/DL (ref 70–130)
GLUCOSE BLDC GLUCOMTR-MCNC: 306 MG/DL (ref 70–130)
GLUCOSE BLDC GLUCOMTR-MCNC: 392 MG/DL (ref 70–130)
GLUCOSE BLDC GLUCOMTR-MCNC: 406 MG/DL (ref 70–130)
GLUCOSE BLDC GLUCOMTR-MCNC: 434 MG/DL (ref 70–130)
GLUCOSE SERPL-MCNC: 355 MG/DL (ref 65–99)
GRAM STN SPEC: ABNORMAL
GRAM STN SPEC: ABNORMAL
HCT VFR BLD AUTO: 29.9 % (ref 37.5–51)
HGB BLD-MCNC: 8.1 G/DL (ref 13–17.7)
ISOLATED FROM: ABNORMAL
MAGNESIUM SERPL-MCNC: 1.7 MG/DL (ref 1.6–2.6)
MCH RBC QN AUTO: 21.4 PG (ref 26.6–33)
MCHC RBC AUTO-ENTMCNC: 27.1 G/DL (ref 31.5–35.7)
MCV RBC AUTO: 79.1 FL (ref 79–97)
PLATELET # BLD AUTO: 498 10*3/MM3 (ref 140–450)
PMV BLD AUTO: 9.6 FL (ref 6–12)
POTASSIUM SERPL-SCNC: 4 MMOL/L (ref 3.5–5.2)
QT INTERVAL: 370 MS
QT INTERVAL: 396 MS
QT INTERVAL: 400 MS
QTC INTERVAL: 472 MS
QTC INTERVAL: 500 MS
QTC INTERVAL: 508 MS
RBC # BLD AUTO: 3.78 10*6/MM3 (ref 4.14–5.8)
SODIUM SERPL-SCNC: 133 MMOL/L (ref 136–145)
WBC # BLD AUTO: 7.04 10*3/MM3 (ref 3.4–10.8)

## 2021-06-05 PROCEDURE — 99233 SBSQ HOSP IP/OBS HIGH 50: CPT | Performed by: INTERNAL MEDICINE

## 2021-06-05 PROCEDURE — 25010000002 MICAFUNGIN SODIUM 100 MG RECONSTITUTED SOLUTION 1 EACH VIAL: Performed by: INTERNAL MEDICINE

## 2021-06-05 PROCEDURE — 63710000001 INSULIN DETEMIR PER 5 UNITS: Performed by: INTERNAL MEDICINE

## 2021-06-05 PROCEDURE — 25010000002 MAGNESIUM SULFATE 2 GM/50ML SOLUTION: Performed by: INTERNAL MEDICINE

## 2021-06-05 PROCEDURE — 25010000002 CEFEPIME PER 500 MG: Performed by: INTERNAL MEDICINE

## 2021-06-05 PROCEDURE — 25010000002 ENOXAPARIN PER 10 MG: Performed by: INTERNAL MEDICINE

## 2021-06-05 PROCEDURE — 80048 BASIC METABOLIC PNL TOTAL CA: CPT | Performed by: INTERNAL MEDICINE

## 2021-06-05 PROCEDURE — 63710000001 INSULIN ASPART PER 5 UNITS: Performed by: INTERNAL MEDICINE

## 2021-06-05 PROCEDURE — 83735 ASSAY OF MAGNESIUM: CPT | Performed by: INTERNAL MEDICINE

## 2021-06-05 PROCEDURE — 82962 GLUCOSE BLOOD TEST: CPT

## 2021-06-05 PROCEDURE — 85027 COMPLETE CBC AUTOMATED: CPT | Performed by: INTERNAL MEDICINE

## 2021-06-05 PROCEDURE — 25010000002 MICAFUNGIN SODIUM 100 MG RECONSTITUTED SOLUTION 1 EACH VIAL: Performed by: PHYSICIAN ASSISTANT

## 2021-06-05 RX ORDER — BUMETANIDE 1 MG/1
2 TABLET ORAL 2 TIMES DAILY
Status: DISCONTINUED | OUTPATIENT
Start: 2021-06-05 | End: 2021-06-09

## 2021-06-05 RX ORDER — PANTOPRAZOLE SODIUM 40 MG/1
40 TABLET, DELAYED RELEASE ORAL
Status: DISCONTINUED | OUTPATIENT
Start: 2021-06-06 | End: 2021-06-14 | Stop reason: HOSPADM

## 2021-06-05 RX ORDER — MAGNESIUM SULFATE HEPTAHYDRATE 40 MG/ML
2 INJECTION, SOLUTION INTRAVENOUS ONCE
Status: COMPLETED | OUTPATIENT
Start: 2021-06-05 | End: 2021-06-05

## 2021-06-05 RX ADMIN — ARIPIPRAZOLE 10 MG: 10 TABLET ORAL at 20:51

## 2021-06-05 RX ADMIN — GABAPENTIN 800 MG: 400 CAPSULE ORAL at 08:57

## 2021-06-05 RX ADMIN — ENOXAPARIN SODIUM 120 MG: 60 INJECTION SUBCUTANEOUS at 12:51

## 2021-06-05 RX ADMIN — ENOXAPARIN SODIUM 120 MG: 60 INJECTION SUBCUTANEOUS at 20:51

## 2021-06-05 RX ADMIN — GABAPENTIN 800 MG: 400 CAPSULE ORAL at 20:51

## 2021-06-05 RX ADMIN — BACLOFEN 30 MG: 10 TABLET ORAL at 12:51

## 2021-06-05 RX ADMIN — CEFEPIME 2 G: 2 INJECTION, POWDER, FOR SOLUTION INTRAVENOUS at 12:53

## 2021-06-05 RX ADMIN — CHOLECALCIFEROL TAB 10 MCG (400 UNIT) 2000 UNITS: 10 TAB at 08:57

## 2021-06-05 RX ADMIN — CEFEPIME HYDROCHLORIDE 2 G: 1 INJECTION, POWDER, FOR SOLUTION INTRAMUSCULAR; INTRAVENOUS at 20:52

## 2021-06-05 RX ADMIN — INSULIN ASPART 6 UNITS: 100 INJECTION, SOLUTION INTRAVENOUS; SUBCUTANEOUS at 17:17

## 2021-06-05 RX ADMIN — INSULIN ASPART 9 UNITS: 100 INJECTION, SOLUTION INTRAVENOUS; SUBCUTANEOUS at 12:52

## 2021-06-05 RX ADMIN — METOPROLOL SUCCINATE 50 MG: 50 TABLET, EXTENDED RELEASE ORAL at 08:57

## 2021-06-05 RX ADMIN — SODIUM HYPOCHLORITE: 1.25 SOLUTION TOPICAL at 20:53

## 2021-06-05 RX ADMIN — DULOXETINE HYDROCHLORIDE 60 MG: 60 CAPSULE, DELAYED RELEASE ORAL at 20:51

## 2021-06-05 RX ADMIN — DULOXETINE HYDROCHLORIDE 60 MG: 60 CAPSULE, DELAYED RELEASE ORAL at 10:14

## 2021-06-05 RX ADMIN — CEFEPIME 2 G: 2 INJECTION, POWDER, FOR SOLUTION INTRAVENOUS at 05:26

## 2021-06-05 RX ADMIN — ATORVASTATIN CALCIUM 10 MG: 10 TABLET, FILM COATED ORAL at 20:51

## 2021-06-05 RX ADMIN — ENOXAPARIN SODIUM 120 MG: 60 INJECTION SUBCUTANEOUS at 00:09

## 2021-06-05 RX ADMIN — MODAFINIL 200 MG: 100 TABLET ORAL at 08:58

## 2021-06-05 RX ADMIN — SODIUM CHLORIDE, PRESERVATIVE FREE 10 ML: 5 INJECTION INTRAVENOUS at 08:58

## 2021-06-05 RX ADMIN — INSULIN ASPART 9 UNITS: 100 INJECTION, SOLUTION INTRAVENOUS; SUBCUTANEOUS at 07:25

## 2021-06-05 RX ADMIN — BACLOFEN 30 MG: 10 TABLET ORAL at 17:16

## 2021-06-05 RX ADMIN — PANTOPRAZOLE SODIUM 40 MG: 40 TABLET, DELAYED RELEASE ORAL at 08:57

## 2021-06-05 RX ADMIN — MICAFUNGIN SODIUM 100 MG: 100 INJECTION, POWDER, LYOPHILIZED, FOR SOLUTION INTRAVENOUS at 10:15

## 2021-06-05 RX ADMIN — Medication 1 CAPSULE: at 08:57

## 2021-06-05 RX ADMIN — METHENAMINE HIPPURATE 1 G: 1 TABLET ORAL at 08:58

## 2021-06-05 RX ADMIN — BACLOFEN 30 MG: 10 TABLET ORAL at 08:57

## 2021-06-05 RX ADMIN — MAGNESIUM SULFATE HEPTAHYDRATE 2 G: 40 INJECTION, SOLUTION INTRAVENOUS at 08:58

## 2021-06-05 RX ADMIN — SODIUM HYPOCHLORITE: 1.25 SOLUTION TOPICAL at 08:59

## 2021-06-05 RX ADMIN — BUMETANIDE 2 MG: 1 TABLET ORAL at 20:53

## 2021-06-05 RX ADMIN — BACLOFEN 30 MG: 10 TABLET ORAL at 20:51

## 2021-06-05 RX ADMIN — MICAFUNGIN SODIUM 100 MG: 100 INJECTION, POWDER, LYOPHILIZED, FOR SOLUTION INTRAVENOUS at 00:06

## 2021-06-05 RX ADMIN — METHENAMINE HIPPURATE 1 G: 1 TABLET ORAL at 17:18

## 2021-06-05 RX ADMIN — GABAPENTIN 800 MG: 400 CAPSULE ORAL at 17:17

## 2021-06-05 RX ADMIN — SODIUM CHLORIDE, PRESERVATIVE FREE 10 ML: 5 INJECTION INTRAVENOUS at 20:52

## 2021-06-05 RX ADMIN — INSULIN DETEMIR 45 UNITS: 100 INJECTION, SOLUTION SUBCUTANEOUS at 10:15

## 2021-06-05 NOTE — PLAN OF CARE
Goal Outcome Evaluation:  Plan of Care Reviewed With: patient  Progress: no change  Outcome Summary: Pt rested in bed throughout shift. No complaints of pain. Wound care completed per orders. No acute changes noted.
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
## 2021-06-05 NOTE — PROGRESS NOTES
[facility] Medicine Services  CROSS COVER NOTE    Contacted by pharmacy stating that the patient's BCID grew Candida Glabrata. Currently receiving IV Cefepime for abx coverage. Discussed with attending, Dr. Burrell, we will give a one time dose of IV Micafungin 100 mg.           Autumn Jean Baptiste PA-C  Hospitalist Service -- [facility]   Pager: [phone]    06/04/21  22:28 EDT

## 2021-06-05 NOTE — PLAN OF CARE
Goal Outcome Evaluation:     Progress: improving  Outcome Summary: Pt has no complaints, no s/s of distress noted. Mg 1.7, replaced per protocol. BS still elevated. Con't IV ABX/wound care. Will con't to monitor.

## 2021-06-06 LAB
ANION GAP SERPL CALCULATED.3IONS-SCNC: 9.7 MMOL/L (ref 5–15)
BUN SERPL-MCNC: 9 MG/DL (ref 6–20)
BUN/CREAT SERPL: 15.5 (ref 7–25)
CALCIUM SPEC-SCNC: 8.4 MG/DL (ref 8.6–10.5)
CHLORIDE SERPL-SCNC: 100 MMOL/L (ref 98–107)
CO2 SERPL-SCNC: 26.3 MMOL/L (ref 22–29)
CREAT SERPL-MCNC: 0.58 MG/DL (ref 0.76–1.27)
DEPRECATED RDW RBC AUTO: 43.7 FL (ref 37–54)
ERYTHROCYTE [DISTWIDTH] IN BLOOD BY AUTOMATED COUNT: 15.6 % (ref 12.3–15.4)
GFR SERPL CREATININE-BSD FRML MDRD: >150 ML/MIN/1.73
GLUCOSE BLDC GLUCOMTR-MCNC: 227 MG/DL (ref 70–130)
GLUCOSE BLDC GLUCOMTR-MCNC: 257 MG/DL (ref 70–130)
GLUCOSE BLDC GLUCOMTR-MCNC: 301 MG/DL (ref 70–130)
GLUCOSE BLDC GLUCOMTR-MCNC: 347 MG/DL (ref 70–130)
GLUCOSE SERPL-MCNC: 234 MG/DL (ref 65–99)
HCT VFR BLD AUTO: 30.7 % (ref 37.5–51)
HGB BLD-MCNC: 8.4 G/DL (ref 13–17.7)
MAGNESIUM SERPL-MCNC: 1.7 MG/DL (ref 1.6–2.6)
MCH RBC QN AUTO: 21.2 PG (ref 26.6–33)
MCHC RBC AUTO-ENTMCNC: 27.4 G/DL (ref 31.5–35.7)
MCV RBC AUTO: 77.5 FL (ref 79–97)
PLATELET # BLD AUTO: 579 10*3/MM3 (ref 140–450)
PMV BLD AUTO: 9.6 FL (ref 6–12)
POTASSIUM SERPL-SCNC: 3.6 MMOL/L (ref 3.5–5.2)
RBC # BLD AUTO: 3.96 10*6/MM3 (ref 4.14–5.8)
SODIUM SERPL-SCNC: 136 MMOL/L (ref 136–145)
WBC # BLD AUTO: 7.15 10*3/MM3 (ref 3.4–10.8)

## 2021-06-06 PROCEDURE — 25010000002 MAGNESIUM SULFATE 2 GM/50ML SOLUTION: Performed by: INTERNAL MEDICINE

## 2021-06-06 PROCEDURE — 85027 COMPLETE CBC AUTOMATED: CPT | Performed by: INTERNAL MEDICINE

## 2021-06-06 PROCEDURE — 63710000001 INSULIN DETEMIR PER 5 UNITS: Performed by: INTERNAL MEDICINE

## 2021-06-06 PROCEDURE — 83735 ASSAY OF MAGNESIUM: CPT | Performed by: INTERNAL MEDICINE

## 2021-06-06 PROCEDURE — 25010000002 CEFEPIME PER 500 MG: Performed by: INTERNAL MEDICINE

## 2021-06-06 PROCEDURE — 25010000002 ENOXAPARIN PER 10 MG: Performed by: INTERNAL MEDICINE

## 2021-06-06 PROCEDURE — 63710000001 INSULIN ASPART PER 5 UNITS: Performed by: INTERNAL MEDICINE

## 2021-06-06 PROCEDURE — 25010000002 MICAFUNGIN SODIUM 100 MG RECONSTITUTED SOLUTION 1 EACH VIAL: Performed by: INTERNAL MEDICINE

## 2021-06-06 PROCEDURE — 99232 SBSQ HOSP IP/OBS MODERATE 35: CPT | Performed by: INTERNAL MEDICINE

## 2021-06-06 PROCEDURE — 80048 BASIC METABOLIC PNL TOTAL CA: CPT | Performed by: INTERNAL MEDICINE

## 2021-06-06 PROCEDURE — 82962 GLUCOSE BLOOD TEST: CPT

## 2021-06-06 RX ORDER — MAGNESIUM SULFATE HEPTAHYDRATE 40 MG/ML
2 INJECTION, SOLUTION INTRAVENOUS ONCE
Status: COMPLETED | OUTPATIENT
Start: 2021-06-06 | End: 2021-06-06

## 2021-06-06 RX ADMIN — DULOXETINE HYDROCHLORIDE 60 MG: 60 CAPSULE, DELAYED RELEASE ORAL at 08:02

## 2021-06-06 RX ADMIN — ENOXAPARIN SODIUM 120 MG: 60 INJECTION SUBCUTANEOUS at 21:19

## 2021-06-06 RX ADMIN — CHOLECALCIFEROL TAB 10 MCG (400 UNIT) 2000 UNITS: 10 TAB at 08:03

## 2021-06-06 RX ADMIN — METHENAMINE HIPPURATE 1 G: 1 TABLET ORAL at 08:24

## 2021-06-06 RX ADMIN — INSULIN ASPART 7 UNITS: 100 INJECTION, SOLUTION INTRAVENOUS; SUBCUTANEOUS at 12:06

## 2021-06-06 RX ADMIN — MODAFINIL 200 MG: 100 TABLET ORAL at 08:32

## 2021-06-06 RX ADMIN — BACLOFEN 30 MG: 10 TABLET ORAL at 21:20

## 2021-06-06 RX ADMIN — INSULIN DETEMIR 45 UNITS: 100 INJECTION, SOLUTION SUBCUTANEOUS at 08:33

## 2021-06-06 RX ADMIN — SODIUM CHLORIDE, PRESERVATIVE FREE 10 ML: 5 INJECTION INTRAVENOUS at 21:19

## 2021-06-06 RX ADMIN — GABAPENTIN 800 MG: 400 CAPSULE ORAL at 21:20

## 2021-06-06 RX ADMIN — METOPROLOL SUCCINATE 50 MG: 50 TABLET, EXTENDED RELEASE ORAL at 08:02

## 2021-06-06 RX ADMIN — GABAPENTIN 800 MG: 400 CAPSULE ORAL at 08:24

## 2021-06-06 RX ADMIN — PANTOPRAZOLE SODIUM 40 MG: 40 TABLET, DELAYED RELEASE ORAL at 04:11

## 2021-06-06 RX ADMIN — BACLOFEN 30 MG: 10 TABLET ORAL at 12:07

## 2021-06-06 RX ADMIN — CEFEPIME HYDROCHLORIDE 2 G: 1 INJECTION, POWDER, FOR SOLUTION INTRAMUSCULAR; INTRAVENOUS at 21:17

## 2021-06-06 RX ADMIN — INSULIN ASPART 4 UNITS: 100 INJECTION, SOLUTION INTRAVENOUS; SUBCUTANEOUS at 07:56

## 2021-06-06 RX ADMIN — ENOXAPARIN SODIUM 120 MG: 60 INJECTION SUBCUTANEOUS at 12:05

## 2021-06-06 RX ADMIN — SODIUM HYPOCHLORITE: 1.25 SOLUTION TOPICAL at 08:09

## 2021-06-06 RX ADMIN — SODIUM CHLORIDE, PRESERVATIVE FREE 10 ML: 5 INJECTION INTRAVENOUS at 08:06

## 2021-06-06 RX ADMIN — CEFEPIME HYDROCHLORIDE 2 G: 1 INJECTION, POWDER, FOR SOLUTION INTRAMUSCULAR; INTRAVENOUS at 12:05

## 2021-06-06 RX ADMIN — BACLOFEN 30 MG: 10 TABLET ORAL at 07:58

## 2021-06-06 RX ADMIN — INSULIN ASPART 8 UNITS: 100 INJECTION, SOLUTION INTRAVENOUS; SUBCUTANEOUS at 17:03

## 2021-06-06 RX ADMIN — BACLOFEN 30 MG: 10 TABLET ORAL at 17:04

## 2021-06-06 RX ADMIN — ARIPIPRAZOLE 10 MG: 10 TABLET ORAL at 21:20

## 2021-06-06 RX ADMIN — BUMETANIDE 2 MG: 1 TABLET ORAL at 08:33

## 2021-06-06 RX ADMIN — INSULIN ASPART 7 UNITS: 100 INJECTION, SOLUTION INTRAVENOUS; SUBCUTANEOUS at 17:04

## 2021-06-06 RX ADMIN — BUMETANIDE 2 MG: 1 TABLET ORAL at 21:20

## 2021-06-06 RX ADMIN — DULOXETINE HYDROCHLORIDE 60 MG: 60 CAPSULE, DELAYED RELEASE ORAL at 21:20

## 2021-06-06 RX ADMIN — ATORVASTATIN CALCIUM 10 MG: 10 TABLET, FILM COATED ORAL at 21:20

## 2021-06-06 RX ADMIN — Medication 1 CAPSULE: at 08:01

## 2021-06-06 RX ADMIN — MAGNESIUM SULFATE IN WATER 2 G: 40 INJECTION, SOLUTION INTRAVENOUS at 05:10

## 2021-06-06 RX ADMIN — MICAFUNGIN SODIUM 100 MG: 100 INJECTION, POWDER, LYOPHILIZED, FOR SOLUTION INTRAVENOUS at 10:15

## 2021-06-06 RX ADMIN — METHENAMINE HIPPURATE 1 G: 1 TABLET ORAL at 17:45

## 2021-06-06 RX ADMIN — GABAPENTIN 800 MG: 400 CAPSULE ORAL at 16:25

## 2021-06-06 RX ADMIN — CEFEPIME HYDROCHLORIDE 2 G: 1 INJECTION, POWDER, FOR SOLUTION INTRAMUSCULAR; INTRAVENOUS at 04:11

## 2021-06-06 RX ADMIN — SODIUM HYPOCHLORITE: 1.25 SOLUTION TOPICAL at 21:20
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
## 2021-06-06 NOTE — PLAN OF CARE
Goal Outcome Evaluation:        Outcome Summary: Patient alert and oriented, colostomy bag and wafer changed today, no complaints, will monitor.

## 2021-06-06 NOTE — PLAN OF CARE
Goal Outcome Evaluation:  Plan of Care Reviewed With: patient  Progress: improving  Outcome Summary: no acute changes. wound care complete. will continue to monitor

## 2021-06-07 LAB
CRP SERPL-MCNC: 4.88 MG/DL (ref 0–0.5)
GLUCOSE BLDC GLUCOMTR-MCNC: 219 MG/DL (ref 70–130)
GLUCOSE BLDC GLUCOMTR-MCNC: 330 MG/DL (ref 70–130)
GLUCOSE BLDC GLUCOMTR-MCNC: 363 MG/DL (ref 70–130)
GLUCOSE BLDC GLUCOMTR-MCNC: 377 MG/DL (ref 70–130)
GLUCOSE BLDC GLUCOMTR-MCNC: 387 MG/DL (ref 70–130)
MAGNESIUM SERPL-MCNC: 1.6 MG/DL (ref 1.6–2.6)
SARS-COV-2 RNA RESP QL NAA+PROBE: NOT DETECTED

## 2021-06-07 PROCEDURE — 99232 SBSQ HOSP IP/OBS MODERATE 35: CPT | Performed by: INTERNAL MEDICINE

## 2021-06-07 PROCEDURE — 63710000001 INSULIN ASPART PER 5 UNITS: Performed by: INTERNAL MEDICINE

## 2021-06-07 PROCEDURE — 94799 UNLISTED PULMONARY SVC/PX: CPT

## 2021-06-07 PROCEDURE — 63710000001 INSULIN DETEMIR PER 5 UNITS: Performed by: INTERNAL MEDICINE

## 2021-06-07 PROCEDURE — 25010000002 MICAFUNGIN SODIUM 100 MG RECONSTITUTED SOLUTION 1 EACH VIAL: Performed by: INTERNAL MEDICINE

## 2021-06-07 PROCEDURE — 83735 ASSAY OF MAGNESIUM: CPT | Performed by: INTERNAL MEDICINE

## 2021-06-07 PROCEDURE — 25010000002 CEFEPIME PER 500 MG: Performed by: INTERNAL MEDICINE

## 2021-06-07 PROCEDURE — 25010000002 MAGNESIUM SULFATE 2 GM/50ML SOLUTION: Performed by: INTERNAL MEDICINE

## 2021-06-07 PROCEDURE — 82962 GLUCOSE BLOOD TEST: CPT

## 2021-06-07 PROCEDURE — 63710000001 INSULIN ASPART PER 5 UNITS: Performed by: PHYSICIAN ASSISTANT

## 2021-06-07 PROCEDURE — 97161 PT EVAL LOW COMPLEX 20 MIN: CPT

## 2021-06-07 PROCEDURE — U0003 INFECTIOUS AGENT DETECTION BY NUCLEIC ACID (DNA OR RNA); SEVERE ACUTE RESPIRATORY SYNDROME CORONAVIRUS 2 (SARS-COV-2) (CORONAVIRUS DISEASE [COVID-19]), AMPLIFIED PROBE TECHNIQUE, MAKING USE OF HIGH THROUGHPUT TECHNOLOGIES AS DESCRIBED BY CMS-2020-01-R: HCPCS | Performed by: NURSE PRACTITIONER

## 2021-06-07 PROCEDURE — 86140 C-REACTIVE PROTEIN: CPT | Performed by: PHYSICIAN ASSISTANT

## 2021-06-07 PROCEDURE — 97165 OT EVAL LOW COMPLEX 30 MIN: CPT

## 2021-06-07 PROCEDURE — 25010000002 ENOXAPARIN PER 10 MG: Performed by: INTERNAL MEDICINE

## 2021-06-07 PROCEDURE — 99222 1ST HOSP IP/OBS MODERATE 55: CPT | Performed by: INTERNAL MEDICINE

## 2021-06-07 RX ORDER — MAGNESIUM SULFATE HEPTAHYDRATE 40 MG/ML
2 INJECTION, SOLUTION INTRAVENOUS ONCE
Status: COMPLETED | OUTPATIENT
Start: 2021-06-07 | End: 2021-06-07

## 2021-06-07 RX ADMIN — INSULIN ASPART 8 UNITS: 100 INJECTION, SOLUTION INTRAVENOUS; SUBCUTANEOUS at 08:33

## 2021-06-07 RX ADMIN — METOPROLOL SUCCINATE 50 MG: 50 TABLET, EXTENDED RELEASE ORAL at 08:36

## 2021-06-07 RX ADMIN — METHENAMINE HIPPURATE 1 G: 1 TABLET ORAL at 16:55

## 2021-06-07 RX ADMIN — INSULIN ASPART 8 UNITS: 100 INJECTION, SOLUTION INTRAVENOUS; SUBCUTANEOUS at 08:36

## 2021-06-07 RX ADMIN — BACLOFEN 30 MG: 10 TABLET ORAL at 11:00

## 2021-06-07 RX ADMIN — METHENAMINE HIPPURATE 1 G: 1 TABLET ORAL at 08:33

## 2021-06-07 RX ADMIN — INSULIN ASPART 8 UNITS: 100 INJECTION, SOLUTION INTRAVENOUS; SUBCUTANEOUS at 10:57

## 2021-06-07 RX ADMIN — BACLOFEN 30 MG: 10 TABLET ORAL at 20:15

## 2021-06-07 RX ADMIN — INSULIN DETEMIR 45 UNITS: 100 INJECTION, SOLUTION SUBCUTANEOUS at 08:32

## 2021-06-07 RX ADMIN — MAGNESIUM SULFATE IN WATER 2 G: 40 INJECTION, SOLUTION INTRAVENOUS at 08:33

## 2021-06-07 RX ADMIN — PANTOPRAZOLE SODIUM 40 MG: 40 TABLET, DELAYED RELEASE ORAL at 04:15

## 2021-06-07 RX ADMIN — SODIUM CHLORIDE, PRESERVATIVE FREE 10 ML: 5 INJECTION INTRAVENOUS at 08:36

## 2021-06-07 RX ADMIN — ARIPIPRAZOLE 10 MG: 10 TABLET ORAL at 20:15

## 2021-06-07 RX ADMIN — BACLOFEN 30 MG: 10 TABLET ORAL at 16:56

## 2021-06-07 RX ADMIN — INSULIN ASPART 10 UNITS: 100 INJECTION, SOLUTION INTRAVENOUS; SUBCUTANEOUS at 21:24

## 2021-06-07 RX ADMIN — BUMETANIDE 2 MG: 1 TABLET ORAL at 08:35

## 2021-06-07 RX ADMIN — CEFEPIME HYDROCHLORIDE 2 G: 1 INJECTION, POWDER, FOR SOLUTION INTRAMUSCULAR; INTRAVENOUS at 13:32

## 2021-06-07 RX ADMIN — INSULIN ASPART 8 UNITS: 100 INJECTION, SOLUTION INTRAVENOUS; SUBCUTANEOUS at 10:59

## 2021-06-07 RX ADMIN — ENOXAPARIN SODIUM 120 MG: 60 INJECTION SUBCUTANEOUS at 20:14

## 2021-06-07 RX ADMIN — CEFEPIME HYDROCHLORIDE 2 G: 1 INJECTION, POWDER, FOR SOLUTION INTRAMUSCULAR; INTRAVENOUS at 20:22

## 2021-06-07 RX ADMIN — CEFEPIME HYDROCHLORIDE 2 G: 1 INJECTION, POWDER, FOR SOLUTION INTRAMUSCULAR; INTRAVENOUS at 04:15

## 2021-06-07 RX ADMIN — GABAPENTIN 800 MG: 400 CAPSULE ORAL at 08:34

## 2021-06-07 RX ADMIN — INSULIN ASPART 4 UNITS: 100 INJECTION, SOLUTION INTRAVENOUS; SUBCUTANEOUS at 16:54

## 2021-06-07 RX ADMIN — SODIUM HYPOCHLORITE: 1.25 SOLUTION TOPICAL at 08:33

## 2021-06-07 RX ADMIN — INSULIN ASPART 4 UNITS: 100 INJECTION, SOLUTION INTRAVENOUS; SUBCUTANEOUS at 13:38

## 2021-06-07 RX ADMIN — Medication 1 CAPSULE: at 08:35

## 2021-06-07 RX ADMIN — CHOLECALCIFEROL TAB 10 MCG (400 UNIT) 2000 UNITS: 10 TAB at 08:34

## 2021-06-07 RX ADMIN — MODAFINIL 200 MG: 100 TABLET ORAL at 08:34

## 2021-06-07 RX ADMIN — SODIUM HYPOCHLORITE: 1.25 SOLUTION TOPICAL at 21:24

## 2021-06-07 RX ADMIN — INSULIN ASPART 12 UNITS: 100 INJECTION, SOLUTION INTRAVENOUS; SUBCUTANEOUS at 16:55

## 2021-06-07 RX ADMIN — DULOXETINE HYDROCHLORIDE 60 MG: 60 CAPSULE, DELAYED RELEASE ORAL at 08:36

## 2021-06-07 RX ADMIN — MICAFUNGIN SODIUM 100 MG: 100 INJECTION, POWDER, LYOPHILIZED, FOR SOLUTION INTRAVENOUS at 10:57

## 2021-06-07 RX ADMIN — ENOXAPARIN SODIUM 120 MG: 60 INJECTION SUBCUTANEOUS at 11:00

## 2021-06-07 RX ADMIN — SODIUM CHLORIDE, PRESERVATIVE FREE 10 ML: 5 INJECTION INTRAVENOUS at 20:15

## 2021-06-07 RX ADMIN — BACLOFEN 30 MG: 10 TABLET ORAL at 08:34

## 2021-06-07 RX ADMIN — ATORVASTATIN CALCIUM 10 MG: 10 TABLET, FILM COATED ORAL at 20:15

## 2021-06-07 RX ADMIN — BUMETANIDE 2 MG: 1 TABLET ORAL at 20:15

## 2021-06-07 RX ADMIN — GABAPENTIN 800 MG: 400 CAPSULE ORAL at 20:15

## 2021-06-07 RX ADMIN — DULOXETINE HYDROCHLORIDE 60 MG: 60 CAPSULE, DELAYED RELEASE ORAL at 20:15

## 2021-06-07 RX ADMIN — GABAPENTIN 800 MG: 400 CAPSULE ORAL at 16:54

## 2021-06-07 NOTE — CASE MANAGEMENT/SOCIAL WORK
Discharge Planning Assessment   Fabricio     Patient Name: Ken Krueger  MRN: [id number]  Today's Date: 6/7/2021    Admit Date: 6/2/2021    Discharge Needs Assessment    No documentation.       Discharge Plan     Row Name 06/07/21 0959       Plan    Plan Pt was admitted on 06/02/21. Pt was transferred from [facility] to [address] [place] [address] over the weekend. SS noted  inpt acute rehab consult on 06/04/21. Per  inpt acute rehab per [facility] PT/OT to evaluate before can make decision about acceptance. SS spoke with pt and he is unsure about inpt rehab but is willing to work with PT/OT and then make that decision. SS to follow.    1144am: SS spoke with Physician who states pt requests to go home instead of inpt acute rehab. SS notified  inpt rehab per Iveth that pt prefers to go home instead. Pt lives with his brother, Pineda. Pt utilizes [facility] home health services. Pt uses a hospital bed and power chair for DME services. Pt may require IV antibiotics at home. SS to follow.     1515pm: SS faxed referral to [facility] per To to check pt's insurance for coverage for IV antibiotics. SS to follow.         Evelyn Deras
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
## 2021-06-07 NOTE — PLAN OF CARE
Goal Outcome Evaluation:  Plan of Care Reviewed With: patient  Progress: improving  Outcome Summary: wound care complete. mag replacement ordered per protocol. will continue to monitor
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
## 2021-06-07 NOTE — DISCHARGE PLACEMENT REQUEST
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 inferior pubic rami consistent with osteomyelitis.  Destructive changes are noted.  Left femoral head appears within normal  limits with no signal abnormalities. No avascular necrosis.  Sclerotic  features of the right femoral head stable from the previous exam.  Complete osteolysis of the right femoral neck is noted with superior  migration of the proximal right femur also stable from previous.  Severe  right acetabular hip joint osteoarthritis.  Advanced osteoarthritis left  acetabular hip joint.  No acute fracture.  No dislocation.    Soft tissues:  There are bilateral initial tuberosity region decubitus  ulcerations with edema of the soft tissues but no loculated fluid  collection to suggest mature abscess.  Likely combination of infectious  and inflammatory myositis of the adductor muscle groups and the  obturator muscle groups.    Vasculature:  Unremarkable.  No lower abdominal aortic aneurysm.    Lymph nodes:  Enlarged inguinal lymph nodes are present and may be  reactive in etiology.    Other findings:  Small right hip effusion.  Small left hip effusion.       Impression:      1.  Bilateral initial tuberosity region decubitus ulcers with  surrounding soft tissue inflammation/infection but no loculated abscess.  2.  Abnormal signal changes of the inferior pubic rami bilaterally  consistent with osteomyelitis.  3.  Secondary myositis of the obturator and adductor muscle groups.  4.  Chronic bilateral hip findings. No change from previous.  5. Enlarged probable reactive bilateral inguinal lymph nodes. Other  findings as above.     This report was finalized on 6/4/2021 10:43 AM by Dr. Sreedhar Gray MD.       CT Abdomen Pelvis With Contrast [[id number]] Collected: 06/03/21 0147     Updated: 06/03/21 0149    Narrative:      CT Abdomen Pelvis W    INDICATION:   Fever. History of paraplegia with decubitus  ulcers.    TECHNIQUE:   CT of the abdomen and pelvis with IV contrast. Coronal and sagittal reconstructions were obtained.  Radiation dose reduction techniques included automated exposure control or exposure modulation based on body size. Count of known CT and cardiac nuc med  studies performed in previous 12 months: 1.     COMPARISON:   2/7/2021    FINDINGS:  There is minimal scarring or atelectasis in the lung bases. Gallbladder is surgically absent. No biliary obstruction is seen. There is a small nonobstructing stone in the lower pole of the left kidney. No ureteral stones are identified. There is a mild  degree of left-sided hydronephrosis that is new from prior. The patient has an ileal conduit on the right side that is draining the left kidney. It is unclear if it is also draining the right kidney. Correlation with a detailed surgical history  recommended. There is borderline enlargement of the spleen. Solid abdominal organs are otherwise within normal limits.    Urinary bladder is distended. The prostate gland may be surgically absent. There is extension of the bladder base below the pelvic floor compatible with a cystocele. No definite bladder wall thickening. There is a descending colostomy. There is no  evidence of acute colitis. There are scattered colonic diverticula. The appendix is not definitely seen. There is no evidence of acute appendicitis. There is no small bowel obstruction. There is bilateral inguinal adenopathy, probably reactive.    There are large soft tissue ulcers in both ischial regions extending directly to the bone. These do contain some packing material. These are not significantly changed. No drainable fluid collection is seen. The right femoral neck is resected or eroded,  unchanged. The lower sacrum and coccyx is eroded or resected.      Impression:      1. Correlation with detailed surgical history is recommended.  2. Large bilateral ulcers in the ischial regions are unchanged  from the prior CT. No drainable fluid collection is seen.  3. There is an ileal conduit at least draining the left kidney if not both. There is mild left hydronephrosis today.  4. Distended urinary bladder. The prostate gland may be surgically absent. There does appear to be a cystocele with the bladder extending below the pelvic floor.  5. Descending colostomy. No evidence of acute colitis, and no small bowel obstruction.  6. Cholecystectomy.  7. Additional findings as above.          Signer Name: Yohan Padron MD   Signed: 6/3/2021 1:47 AM   Workstation Name: [id number]    [facility]    XR Chest 1 View [[id number]] Collected: 06/02/21 2228     Updated: 06/02/21 2231    Narrative:      CR Chest 1 Vw    INDICATION:   Chest pain.     COMPARISON:    Chest x-ray from 2/13/2021    FINDINGS:  Single portable AP view(s) of the chest.  Limited exam. Cardiomediastinal silhouette is stable. Spinal fusion hardware is again noted. Lungs are grossly clear with probable prominent epicardial fat pad. No pneumothorax or acute osseous abnormality.      Impression:      No acute cardiopulmonary findings. Stable exam.    Signer Name: Fransisca Posey MD   Signed: 6/2/2021 10:28 PM   Workstation Name: [id number]    [facility]        I have personally reviewed the above radiology results.   ---------------------------------------------------------------------------------------------------------------------      Assessment & Plan        Assessment/Plan       ASSESSMENT:    1.  Septic shock with lactic acid greater than 4 on admission  2.  Osteomyelitis  3.  UTI  4.  Bacteremia    PLAN:    The patient presented to [facility] Emergency Department on 6/2/2021 for chest pain, fever, and weakness.    Today, the patient is on room air in no apparent distress.  Patient denies any complaints at this time and is feeling well today.  Afebrile.  CRP is improving at 4.88.  WBC on 6/6/2021 was  normal.  Lactic acid on 6/2/2021 was significantly elevated at 5.8.  BC ID on 6/2/2021 finalized as Candida glabrata.  Another BC ID on 6/2/2021 finalized as group B strep.  2 out of 2 blood cultures on 6/2/2021 finalized as group B strep and 1 out of the 2 blood cultures also finalized with yeast.  1 blood culture on 6/4/2021 is so far showing no growth urinalysis on 6/2/2021 was positive and urine culture finalized as greater than 100,000 colonies of Serratia marcescens.  COVID-19 and flu A/B PCR on 6/3/2021 was negative.  MRI of the pelvis without contrast on 6/4/2021 showed bilateral ischial tuberosity region decubitus ulcers with surrounding soft tissue inflammation/infection but no loculated abscess.  Abnormal signal changes of the inferior pubic rami bilaterally consistent with osteomyelitis.  Secondary myositis of the obturator and abductor muscle groups.  Chronic bilateral hip findings.  No change from previous.  Enlarged probable reactive bilateral inguinal lymph nodes.  CT abdomen pelvis with contrast on 6/3/2021 showed correlation with detailed surgical history is recommended.  Large bilateral ulcers in the ischial regions are unchanged from prior.  No drainable fluid collection is seen.  There is an ileal conduit at least draining the left kidney if not both.  There is mild left hydronephrosis today.  Distended urinary bladder.  The prostate gland may be surgically absent.  There does appear to be a cystocele with the bladder extending below the pelvic floor.  Descending colostomy.  No evidence of acute colitis, and no small bowel obstruction.  Cholecystectomy.  Chest x-ray on 6/2/2021 showed no acute cardiopulmonary findings.    Patient is a very high risk of infective endocarditis and recommend VISHAL and to repeat blood cultures x2 sets until clearing.  Patient will require a prolonged course of IV antibiotic therapy regardless of VISHAL results.  Recommend close surgical follow-up for possible closure.   Cefepime and micafungin are appropriate coverage.    Again, thank you Dr. Cho for allowing us to participate in the care of your patient and please feel free to call for any questions you may have.    Code Status:   Code Status and Medical Interventions:   Ordered at: 06/02/21 6919     Code Status:    CPR     Medical Interventions (Level of Support Prior to Arrest):    Full     Scribed for Tra Jain MD by KAYLAH Rhodes. 6/7/2021  12:58 EDT  KAYLAH Rhodes  06/07/21  07:53 EDT      Electronically signed by Christy Caro PA at 06/07/21 1383
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
## 2021-06-07 NOTE — PLAN OF CARE
Goal Outcome Evaluation:           Progress: no change  Outcome Summary: Patient has done well today. Mag replaced. Wound care tolerated well. No distress noted.
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
## 2021-06-08 ENCOUNTER — ANESTHESIA (OUTPATIENT)
Dept: CARDIOLOGY | Facility: HOSPITAL | Age: 46
End: 2021-06-08

## 2021-06-08 ENCOUNTER — APPOINTMENT (OUTPATIENT)
Dept: CARDIOLOGY | Facility: HOSPITAL | Age: 46
End: 2021-06-08

## 2021-06-08 ENCOUNTER — ANESTHESIA EVENT (OUTPATIENT)
Dept: CARDIOLOGY | Facility: HOSPITAL | Age: 46
End: 2021-06-08

## 2021-06-08 VITALS — SYSTOLIC BLOOD PRESSURE: 96 MMHG | TEMPERATURE: 98 F | OXYGEN SATURATION: 96 % | DIASTOLIC BLOOD PRESSURE: 60 MMHG

## 2021-06-08 LAB
ANION GAP SERPL CALCULATED.3IONS-SCNC: 13 MMOL/L (ref 5–15)
BH CV ECHO MEAS - BSA(HAYCOCK): 2.5 M^2
BH CV ECHO MEAS - BSA: 2.4 M^2
BH CV ECHO MEAS - BZI_BMI: 36.8 KILOGRAMS/M^2
BH CV ECHO MEAS - BZI_METRIC_HEIGHT: 180.3 CM
BH CV ECHO MEAS - BZI_METRIC_WEIGHT: 119.8 KG
BUN SERPL-MCNC: 16 MG/DL (ref 6–20)
BUN/CREAT SERPL: 25.4 (ref 7–25)
CALCIUM SPEC-SCNC: 9.2 MG/DL (ref 8.6–10.5)
CHLORIDE SERPL-SCNC: 93 MMOL/L (ref 98–107)
CO2 SERPL-SCNC: 27 MMOL/L (ref 22–29)
CREAT SERPL-MCNC: 0.63 MG/DL (ref 0.76–1.27)
CRP SERPL-MCNC: 3.49 MG/DL (ref 0–0.5)
DEPRECATED RDW RBC AUTO: 43.7 FL (ref 37–54)
ERYTHROCYTE [DISTWIDTH] IN BLOOD BY AUTOMATED COUNT: 15.8 % (ref 12.3–15.4)
GFR SERPL CREATININE-BSD FRML MDRD: 139 ML/MIN/1.73
GLUCOSE BLDC GLUCOMTR-MCNC: 216 MG/DL (ref 70–130)
GLUCOSE BLDC GLUCOMTR-MCNC: 314 MG/DL (ref 70–130)
GLUCOSE BLDC GLUCOMTR-MCNC: 316 MG/DL (ref 70–130)
GLUCOSE BLDC GLUCOMTR-MCNC: 345 MG/DL (ref 70–130)
GLUCOSE SERPL-MCNC: 333 MG/DL (ref 65–99)
HCT VFR BLD AUTO: 35.8 % (ref 37.5–51)
HGB BLD-MCNC: 9.8 G/DL (ref 13–17.7)
MAGNESIUM SERPL-MCNC: 2.1 MG/DL (ref 1.6–2.6)
MAXIMAL PREDICTED HEART RATE: 177 BPM
MCH RBC QN AUTO: 21.4 PG (ref 26.6–33)
MCHC RBC AUTO-ENTMCNC: 27.4 G/DL (ref 31.5–35.7)
MCV RBC AUTO: 78 FL (ref 79–97)
PLATELET # BLD AUTO: 596 10*3/MM3 (ref 140–450)
PMV BLD AUTO: 9.3 FL (ref 6–12)
POTASSIUM SERPL-SCNC: 4.2 MMOL/L (ref 3.5–5.2)
RBC # BLD AUTO: 4.59 10*6/MM3 (ref 4.14–5.8)
SODIUM SERPL-SCNC: 133 MMOL/L (ref 136–145)
STRESS TARGET HR: 150 BPM
WBC # BLD AUTO: 7.37 10*3/MM3 (ref 3.4–10.8)

## 2021-06-08 PROCEDURE — 80048 BASIC METABOLIC PNL TOTAL CA: CPT | Performed by: INTERNAL MEDICINE

## 2021-06-08 PROCEDURE — 99233 SBSQ HOSP IP/OBS HIGH 50: CPT | Performed by: INTERNAL MEDICINE

## 2021-06-08 PROCEDURE — 25010000002 CEFEPIME PER 500 MG: Performed by: INTERNAL MEDICINE

## 2021-06-08 PROCEDURE — 86140 C-REACTIVE PROTEIN: CPT | Performed by: PHYSICIAN ASSISTANT

## 2021-06-08 PROCEDURE — 93312 ECHO TRANSESOPHAGEAL: CPT | Performed by: INTERNAL MEDICINE

## 2021-06-08 PROCEDURE — 94799 UNLISTED PULMONARY SVC/PX: CPT

## 2021-06-08 PROCEDURE — 99231 SBSQ HOSP IP/OBS SF/LOW 25: CPT | Performed by: INTERNAL MEDICINE

## 2021-06-08 PROCEDURE — 63710000001 INSULIN ASPART PER 5 UNITS: Performed by: INTERNAL MEDICINE

## 2021-06-08 PROCEDURE — 25010000002 MICAFUNGIN SODIUM 100 MG RECONSTITUTED SOLUTION 1 EACH VIAL: Performed by: INTERNAL MEDICINE

## 2021-06-08 PROCEDURE — 93325 DOPPLER ECHO COLOR FLOW MAPG: CPT | Performed by: INTERNAL MEDICINE

## 2021-06-08 PROCEDURE — 82962 GLUCOSE BLOOD TEST: CPT

## 2021-06-08 PROCEDURE — 83735 ASSAY OF MAGNESIUM: CPT | Performed by: INTERNAL MEDICINE

## 2021-06-08 PROCEDURE — 93312 ECHO TRANSESOPHAGEAL: CPT

## 2021-06-08 PROCEDURE — 25010000002 PROPOFOL 10 MG/ML EMULSION: Performed by: NURSE ANESTHETIST, CERTIFIED REGISTERED

## 2021-06-08 PROCEDURE — 87040 BLOOD CULTURE FOR BACTERIA: CPT | Performed by: INTERNAL MEDICINE

## 2021-06-08 PROCEDURE — 93325 DOPPLER ECHO COLOR FLOW MAPG: CPT

## 2021-06-08 PROCEDURE — 85027 COMPLETE CBC AUTOMATED: CPT | Performed by: INTERNAL MEDICINE

## 2021-06-08 PROCEDURE — 25010000002 ENOXAPARIN PER 10 MG: Performed by: INTERNAL MEDICINE

## 2021-06-08 PROCEDURE — 63710000001 INSULIN DETEMIR PER 5 UNITS: Performed by: INTERNAL MEDICINE

## 2021-06-08 RX ORDER — METOPROLOL SUCCINATE 25 MG/1
25 TABLET, EXTENDED RELEASE ORAL
Status: DISCONTINUED | OUTPATIENT
Start: 2021-06-09 | End: 2021-06-09

## 2021-06-08 RX ORDER — PROPOFOL 10 MG/ML
VIAL (ML) INTRAVENOUS AS NEEDED
Status: DISCONTINUED | OUTPATIENT
Start: 2021-06-08 | End: 2021-06-08 | Stop reason: SURG

## 2021-06-08 RX ORDER — SODIUM CHLORIDE 9 MG/ML
INJECTION, SOLUTION INTRAVENOUS CONTINUOUS PRN
Status: DISCONTINUED | OUTPATIENT
Start: 2021-06-08 | End: 2021-06-08 | Stop reason: SURG

## 2021-06-08 RX ORDER — VALSARTAN 80 MG/1
40 TABLET ORAL
Status: DISCONTINUED | OUTPATIENT
Start: 2021-06-08 | End: 2021-06-09

## 2021-06-08 RX ADMIN — METOPROLOL SUCCINATE 50 MG: 50 TABLET, EXTENDED RELEASE ORAL at 10:13

## 2021-06-08 RX ADMIN — GABAPENTIN 800 MG: 400 CAPSULE ORAL at 20:53

## 2021-06-08 RX ADMIN — INSULIN ASPART 7 UNITS: 100 INJECTION, SOLUTION INTRAVENOUS; SUBCUTANEOUS at 12:13

## 2021-06-08 RX ADMIN — SODIUM HYPOCHLORITE: 1.25 SOLUTION TOPICAL at 20:55

## 2021-06-08 RX ADMIN — BUMETANIDE 2 MG: 1 TABLET ORAL at 10:10

## 2021-06-08 RX ADMIN — CEFEPIME HYDROCHLORIDE 2 G: 1 INJECTION, POWDER, FOR SOLUTION INTRAMUSCULAR; INTRAVENOUS at 12:13

## 2021-06-08 RX ADMIN — INSULIN ASPART 7 UNITS: 100 INJECTION, SOLUTION INTRAVENOUS; SUBCUTANEOUS at 17:33

## 2021-06-08 RX ADMIN — BACLOFEN 30 MG: 10 TABLET ORAL at 17:33

## 2021-06-08 RX ADMIN — BUMETANIDE 2 MG: 1 TABLET ORAL at 20:53

## 2021-06-08 RX ADMIN — BACLOFEN 30 MG: 10 TABLET ORAL at 20:53

## 2021-06-08 RX ADMIN — MODAFINIL 200 MG: 100 TABLET ORAL at 10:13

## 2021-06-08 RX ADMIN — MICAFUNGIN SODIUM 100 MG: 100 INJECTION, POWDER, LYOPHILIZED, FOR SOLUTION INTRAVENOUS at 10:08

## 2021-06-08 RX ADMIN — DULOXETINE HYDROCHLORIDE 60 MG: 60 CAPSULE, DELAYED RELEASE ORAL at 10:14

## 2021-06-08 RX ADMIN — GABAPENTIN 800 MG: 400 CAPSULE ORAL at 16:28

## 2021-06-08 RX ADMIN — INSULIN ASPART 12 UNITS: 100 INJECTION, SOLUTION INTRAVENOUS; SUBCUTANEOUS at 17:34

## 2021-06-08 RX ADMIN — METHENAMINE HIPPURATE 1 G: 1 TABLET ORAL at 17:33

## 2021-06-08 RX ADMIN — ATORVASTATIN CALCIUM 10 MG: 10 TABLET, FILM COATED ORAL at 20:53

## 2021-06-08 RX ADMIN — DULOXETINE HYDROCHLORIDE 60 MG: 60 CAPSULE, DELAYED RELEASE ORAL at 20:53

## 2021-06-08 RX ADMIN — ENOXAPARIN SODIUM 120 MG: 60 INJECTION SUBCUTANEOUS at 12:14

## 2021-06-08 RX ADMIN — BACLOFEN 30 MG: 10 TABLET ORAL at 10:11

## 2021-06-08 RX ADMIN — CEFEPIME HYDROCHLORIDE 2 G: 1 INJECTION, POWDER, FOR SOLUTION INTRAMUSCULAR; INTRAVENOUS at 06:00

## 2021-06-08 RX ADMIN — PROPOFOL 10 MG: 10 INJECTION, EMULSION INTRAVENOUS at 09:19

## 2021-06-08 RX ADMIN — Medication 1 CAPSULE: at 10:14

## 2021-06-08 RX ADMIN — INSULIN ASPART 12 UNITS: 100 INJECTION, SOLUTION INTRAVENOUS; SUBCUTANEOUS at 12:15

## 2021-06-08 RX ADMIN — SODIUM HYPOCHLORITE: 1.25 SOLUTION TOPICAL at 10:19

## 2021-06-08 RX ADMIN — INSULIN DETEMIR 45 UNITS: 100 INJECTION, SOLUTION SUBCUTANEOUS at 10:21

## 2021-06-08 RX ADMIN — ARIPIPRAZOLE 10 MG: 10 TABLET ORAL at 20:53

## 2021-06-08 RX ADMIN — CHOLECALCIFEROL TAB 10 MCG (400 UNIT) 2000 UNITS: 10 TAB at 10:14

## 2021-06-08 RX ADMIN — SODIUM CHLORIDE, PRESERVATIVE FREE 10 ML: 5 INJECTION INTRAVENOUS at 20:53

## 2021-06-08 RX ADMIN — VALSARTAN 40 MG: 80 TABLET ORAL at 13:56

## 2021-06-08 RX ADMIN — SODIUM CHLORIDE, PRESERVATIVE FREE 10 ML: 5 INJECTION INTRAVENOUS at 10:16

## 2021-06-08 RX ADMIN — GABAPENTIN 800 MG: 400 CAPSULE ORAL at 10:13

## 2021-06-08 RX ADMIN — ENOXAPARIN SODIUM 120 MG: 60 INJECTION SUBCUTANEOUS at 20:54

## 2021-06-08 RX ADMIN — BACLOFEN 30 MG: 10 TABLET ORAL at 12:14

## 2021-06-08 RX ADMIN — PROPOFOL 10 MG: 10 INJECTION, EMULSION INTRAVENOUS at 09:17

## 2021-06-08 RX ADMIN — PROPOFOL 70 MG: 10 INJECTION, EMULSION INTRAVENOUS at 09:15

## 2021-06-08 RX ADMIN — CEFEPIME HYDROCHLORIDE 2 G: 1 INJECTION, POWDER, FOR SOLUTION INTRAMUSCULAR; INTRAVENOUS at 20:53

## 2021-06-08 RX ADMIN — METHENAMINE HIPPURATE 1 G: 1 TABLET ORAL at 10:10

## 2021-06-08 RX ADMIN — SODIUM CHLORIDE: 0.9 INJECTION, SOLUTION INTRAVENOUS at 09:10

## 2021-06-08 NOTE — PROGRESS NOTES
Antimicrobial Length of Therapy:    Day 6 cefepime  Day 4 Mycamine    Thank you,    Gayle Kaye, PharmD  6/8/2021  14:36 EDT

## 2021-06-08 NOTE — PROGRESS NOTES
PROGRESS NOTE         Patient Identification:  Name:  Ken Krueger  Age:  45 y.o.  Sex:  male  :  1975  MRN:  [id number]  Visit Number:  [id number]  Primary Care Provider:  Franchesca Hodges APRN         LOS: 6 days       ----------------------------------------------------------------------------------------------------------------------  Subjective       Chief Complaints:    Chest Pain and Fever        Interval History:      The patient is resting comfortably in bed on 6 L nasal cannula in no apparent distress.  Denies any complaints at this time including shortness of breath, chest pain, abdominal pain, nausea, vomiting, or increased output from ostomy.  Afebrile.  CRP is improving at 3.49.  VISHAL on 2021 revealed no echocardiographic evidence of any endocardial vegetations or aortic root dilatation.  Blood culture on 2021 is so far showing no growth.    Review of Systems:    Constitutional: no fever, chills and night sweats. No appetite change or unexpected weight change. No fatigue.  Eyes: no eye drainage, itching or redness.  HEENT: no mouth sores, dysphagia or nose bleed.  Respiratory: no for shortness of breath, cough or production of sputum.  Cardiovascular: no chest pain, no palpitations, no orthopnea.  Gastrointestinal: no nausea, vomiting or diarrhea. No abdominal pain, hematemesis or rectal bleeding.  Genitourinary: no dysuria or polyuria.  Hematologic/lymphatic: no lymph node abnormalities, no easy bruising or easy bleeding.  Musculoskeletal: no muscle or joint pain.  Left AKA.  Skin: No rash and no itching.  Sacral wounds.  Neurological: no loss of consciousness, no seizure, no headache.  Paraplegia.  Behavioral/Psych: no depression or suicidal ideation.  Endocrine: no hot flashes.  Immunologic: negative.  ----------------------------------------------------------------------------------------------------------------------      Objective       Current Hospital  Meds:  ARIPiprazole, 10 mg, Oral, Nightly  atorvastatin, 10 mg, Oral, Nightly  baclofen, 30 mg, Oral, 4x Daily With Meals & Nightly  bumetanide, 2 mg, Oral, BID  cefepime, 2 g, Intravenous, Q8H  cholecalciferol, 2,000 Units, Oral, Daily  DULoxetine, 60 mg, Oral, BID  enoxaparin, 1 mg/kg, Subcutaneous, Q12H  gabapentin, 800 mg, Oral, TID  insulin aspart, 0-9 Units, Subcutaneous, TID AC  insulin aspart, 12 Units, Subcutaneous, TID With Meals  insulin detemir, 45 Units, Subcutaneous, Daily  lactobacillus acidophilus, 1 capsule, Oral, Daily  methenamine, 1 g, Oral, BID With Meals  metoprolol succinate XL, 50 mg, Oral, Q24H  micafungin (MYCAMINE) IV, 100 mg, Intravenous, Q24H  modafinil, 200 mg, Oral, Daily  pantoprazole, 40 mg, Oral, Q AM  sodium chloride, 10 mL, Intravenous, Q12H  sodium hypochlorite, , Topical, Q12H         ----------------------------------------------------------------------------------------------------------------------    Vital Signs:  Temp:  [97.3 °F (36.3 °C)-98 °F (36.7 °C)] 97.6 °F (36.4 °C)  Heart Rate:  [64-79] 67  Resp:  [16-20] 16  BP: ()/(52-67) 103/60  Mean Arterial Pressure (Non-Invasive) for the past 24 hrs (Last 3 readings):   Noninvasive MAP (mmHg)   06/08/21 0615 80   06/07/21 1402 64     SpO2 Percentage    06/08/21 0945 06/08/21 0950 06/08/21 0956   SpO2: 98% 99% 99%     SpO2:  [89 %-100 %] 99 %  on  Flow (L/min):  [6] 6;   Device (Oxygen Therapy): nasal cannula    Body mass index is 36.82 kg/m².  Wt Readings from Last 3 Encounters:   06/04/21 120 kg (264 lb)   02/15/21 126 kg (277 lb 12.5 oz)   06/02/20 (!) 150 kg (330 lb)        Intake/Output Summary (Last 24 hours) at 6/8/2021 1100  Last data filed at 6/8/2021 0927  Gross per 24 hour   Intake 1550 ml   Output 6350 ml   Net -4800 ml     NPO Diet  ----------------------------------------------------------------------------------------------------------------------      Physical Exam:    Constitutional: Morbidly obese   male is resting comfortably in bed in no apparent distress.  Paraplegic.  HENT:  Head: Normocephalic and atraumatic.  Mouth:  Moist mucous membranes.    Eyes:  Conjunctivae and EOM are normal.  No scleral icterus.  Neck:  Neck supple.  No JVD present.    Cardiovascular:  Normal rate, regular rhythm and normal heart sounds with no murmur. No edema.  Pulmonary/Chest:  No respiratory distress, no wheezes, no crackles, with normal breath sounds and good air movement.  Abdominal:  Soft.  Bowel sounds are normal. No tenderness or distention. Colostomy and ileostomy.  Morbidly obese.  Musculoskeletal: Left AKA, right leg atrophy.  Neurological:  Alert and oriented to person, place, and time.  No facial droop.  No slurred speech.  Paraplegic.  Skin:  Decubitus ulcers.  Psychiatric:  Normal mood and affect.  Behavior is normal.  ----------------------------------------------------------------------------------------------------------------------  Results from last 7 days   Lab Units 06/03/21  1728 06/02/21  2328 06/02/21  2110   CK TOTAL U/L  --   --  22   TROPONIN T ng/mL <0.010 <0.010 <0.010     Results from last 7 days   Lab Units 06/02/21  2110   PROBNP pg/mL 1,812.0*         Results from last 7 days   Lab Units 06/08/21  0055 06/07/21  0406 06/06/21  0227 06/05/21  0425 06/04/21  0110 06/03/21  0058 06/02/21  2110   CRP mg/dL 3.49* 4.88*  --   --   --   --  14.87*   LACTATE mmol/L  --   --   --   --   --  2.0 5.8*   WBC 10*3/mm3  --   --  7.15 7.04 7.73  --  10.08   HEMOGLOBIN g/dL  --   --  8.4* 8.1* 8.2*  --  8.5*   HEMATOCRIT %  --   --  30.7* 29.9* 29.6*  --  30.0*   MCV fL  --   --  77.5* 79.1 77.1*  --  78.3*   MCHC g/dL  --   --  27.4* 27.1* 27.7*  --  28.3*   PLATELETS 10*3/mm3  --   --  579* 498* 503*  --  488*     Results from last 7 days   Lab Units 06/08/21  0055 06/07/21  0406 06/06/21  0227 06/05/21  0425 06/04/21  0110 06/03/21  0710 06/03/21  0317 06/02/21  2110 06/02/21  2110   SODIUM mmol/L   --   --  136 133* 132*  --  132*  --  128*   POTASSIUM mmol/L  --   --  3.6 4.0 3.9   < > 3.4*  --  3.9   MAGNESIUM mg/dL 2.1 1.6 1.7 1.7  --   --   --    < > 1.4*   CHLORIDE mmol/L  --   --  100 98 99  --  99  --  92*   CO2 mmol/L  --   --  26.3 24.7 21.0*  --  21.9*  --  20.5*   BUN mg/dL  --   --  9 9 5*  --  3*  --  3*   CREATININE mg/dL  --   --  0.58* 0.65* 0.58*  --  0.62*  --  0.80   EGFR IF NONAFRICN AM mL/min/1.73  --   --  >150 134 >150  --  142  --  106   CALCIUM mg/dL  --   --  8.4* 8.4* 8.1*  --  8.3*  --  8.7   GLUCOSE mg/dL  --   --  234* 355* 336*  --  362*  --  614*   ALBUMIN g/dL  --   --   --   --   --   --  2.29*  --  2.69*   BILIRUBIN mg/dL  --   --   --   --   --   --  0.3  --  0.3   ALK PHOS U/L  --   --   --   --   --   --  133*  --  157*   AST (SGOT) U/L  --   --   --   --   --   --  19  --  27   ALT (SGPT) U/L  --   --   --   --   --   --  22  --  26    < > = values in this interval not displayed.   Estimated Creatinine Clearance: 216.5 mL/min (A) (by C-G formula based on SCr of 0.58 mg/dL (L)).  No results found for: AMMONIA    Glucose   Date/Time Value Ref Range Status   06/08/2021 1034 316 (H) 70 - 130 mg/dL Final   06/08/2021 0621 345 (H) 70 - 130 mg/dL Final   06/07/2021 2227 330 (H) 70 - 130 mg/dL Final   06/07/2021 2026 363 (H) 70 - 130 mg/dL Final   06/07/2021 1653 219 (H) 70 - 130 mg/dL Final   06/07/2021 1056 377 (H) 70 - 130 mg/dL Final   06/07/2021 0641 387 (H) 70 - 130 mg/dL Final   06/06/2021 2115 257 (H) 70 - 130 mg/dL Final     Lab Results   Component Value Date    HGBA1C 10.80 (H) 02/17/2021     Lab Results   Component Value Date    TSH 0.876 06/02/2021    FREET4 1.31 06/02/2021       Blood Culture   Date Value Ref Range Status   06/04/2021 No growth at 4 days  Preliminary   06/02/2021 Streptococcus agalactiae (Group B) (C)  Final   06/02/2021 Streptococcus agalactiae (Group B) (C)  Preliminary     Comment:     Refer to previous blood culture collected on 6/2/2021 at 2157  for MICs.   06/02/2021 Candida glabrata (C)  Corrected     Comment:     Infectious disease consultation is highly recommended to rule out distant foci of infection.    Fluconazole susceptibility to follow.  Appended report. These results have been appended to a previously final verified report.     Urine Culture   Date Value Ref Range Status   06/02/2021 >100,000 CFU/mL Serratia marcescens (A)  Final     Comment:     Please call the lab if pip/devorah is requested for Serratia spp. Will have to be set up by disk diffusion.       No results found for: WOUNDCX  No results found for: STOOLCX  No results found for: RESPCX  Pain Management Panel       Pain Management Panel Latest Ref Rng & Units 6/2/2021 8/19/2018    AMPHETAMINES SCREEN, URINE Negative Negative Negative    BARBITURATES SCREEN Negative Negative Negative    BENZODIAZEPINE SCREEN, URINE Negative Negative Negative    BUPRENORPHINEUR Negative Negative Negative    COCAINE SCREEN, URINE Negative Negative Negative    METHADONE SCREEN, URINE Negative Negative Negative              ----------------------------------------------------------------------------------------------------------------------  Imaging Results (Last 24 Hours)       ** No results found for the last 24 hours. **            ----------------------------------------------------------------------------------------------------------------------    Assessment/Plan       Assessment/Plan     ASSESSMENT:    1.  Septic shock with lactic acid greater than 4 on admission  2.  Osteomyelitis  3.  UTI  4.  Bacteremia    PLAN:    The patient is resting comfortably in bed on 6 L nasal cannula in no apparent distress.  Denies any complaints at this time including shortness of breath, chest pain, abdominal pain, nausea, vomiting, or increased output from ostomy.  Afebrile.  CRP is improving at 3.49.  VISHAL on 6/8/2021 revealed no echocardiographic evidence of any endocardial vegetations or aortic root dilatation.   Blood culture on 6/4/2021 is so far showing no growth.    BC ID on 6/2/2021 finalized as Candida glabrata.  Another BC ID on 6/2/2021 finalized as group B strep.  2 out of 2 blood cultures on 6/2/2021 finalized as group B strep and 1 out of the 2 blood cultures also finalized with yeast.  1 blood culture on 6/4/2021 is so far showing no growth. Urinalysis on 6/2/2021 was positive and urine culture finalized as greater than 100,000 colonies of Serratia marcescens.  COVID-19 and flu A/B PCR on 6/3/2021 was negative.  MRI of the pelvis without contrast on 6/4/2021 showed bilateral ischial tuberosity region decubitus ulcers with surrounding soft tissue inflammation/infection but no loculated abscess.  Abnormal signal changes of the inferior pubic rami bilaterally consistent with osteomyelitis.  Secondary myositis of the obturator and abductor muscle groups.  Chronic bilateral hip findings.  No change from previous.  Enlarged probable reactive bilateral inguinal lymph nodes.  CT abdomen pelvis with contrast on 6/3/2021 showed correlation with detailed surgical history is recommended.  Large bilateral ulcers in the ischial regions are unchanged from prior.  No drainable fluid collection is seen.  There is an ileal conduit at least draining the left kidney if not both.  There is mild left hydronephrosis today.  Distended urinary bladder.  The prostate gland may be surgically absent.  There does appear to be a cystocele with the bladder extending below the pelvic floor.  Descending colostomy.  No evidence of acute colitis, and no small bowel obstruction.  Cholecystectomy.  Chest x-ray on 6/2/2021 showed no acute cardiopulmonary findings.    Cefepime and micafungin are appropriate coverage and patient will require a prolonged course of IV antibiotic therapy x6 weeks.  Would recommend to repeat blood cultures x2 sets until clearing.  Recommend close surgical follow-up for possible closure.    Code Status:   Code Status and  Medical Interventions:   Ordered at: 06/02/21 8216     Code Status:    CPR     Medical Interventions (Level of Support Prior to Arrest):    KAYLAH Pulido  06/08/21  11:00 EDT
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
## 2021-06-08 NOTE — PLAN OF CARE
Goal Outcome Evaluation:  Plan of Care Reviewed With: patient        Progress: no change  Outcome Summary: mag stable at 2.1 after replacement. pt npo for VISHAL. wound care complete. will continue to monitor
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
## 2021-06-08 NOTE — ANESTHESIA PREPROCEDURE EVALUATION
Anesthesia Evaluation     Patient summary reviewed and Nursing notes reviewed   NPO Solid Status: > 8 hours  NPO Liquid Status: > 8 hours           Airway   Mallampati: III  TM distance: >3 FB  Neck ROM: limited  no difficulty expected and Anterior  Dental    (+) poor dentition    Pulmonary    (+) pulmonary embolism, a smoker Current Abstained day of surgery, asthma,shortness of breath, sleep apnea, decreased breath sounds,   Cardiovascular - normal exam  Exercise tolerance: unable to assess    (+) hypertension, hyperlipidemia,       Neuro/Psych  GI/Hepatic/Renal/Endo    (+) morbid obesity, GERD,  diabetes mellitus type 2 poorly controlled,     Musculoskeletal     Abdominal  - normal exam   Substance History      OB/GYN          Other      history of cancer remission                    Anesthesia Plan    ASA 3     general   total IV anesthesia  intravenous induction     Anesthetic plan, all risks, benefits, and alternatives have been provided, discussed and informed consent has been obtained with: patient.    Plan discussed with CRNA.

## 2021-06-08 NOTE — PLAN OF CARE
Goal Outcome Evaluation:              Outcome Summary: Patient alert and oriented, VISHAL today, no complaints, will monitor.
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
## 2021-06-08 NOTE — CASE MANAGEMENT/SOCIAL WORK
Discharge Planning Assessment   Fabricio     Patient Name: Ken Krueger  MRN: [id number]  Today's Date: 6/8/2021    Admit Date: 6/2/2021    Discharge Needs Assessment    No documentation.       Discharge Plan     Row Name 06/08/21 1236       Plan    Plan SS spoke with [facility] per To who states pt's insurance will pay 100% for current IV antibiotics. SS to follow.    3294pm: SS spoke with pt's POA Bonifacio Krueger [phone] who states he and pt's mother would be willing to help pt at home with IV antibiotics regardless of dose and duration of need. SS spoke with pt and he prefers to return home with home health and pt's brother and mother assisting him with IV antibiotics. SS will follow.           Evelyn Deras

## 2021-06-09 LAB
ANION GAP SERPL CALCULATED.3IONS-SCNC: 12.3 MMOL/L (ref 5–15)
BACTERIA SPEC AEROBE CULT: NORMAL
BUN SERPL-MCNC: 25 MG/DL (ref 6–20)
BUN/CREAT SERPL: 25.3 (ref 7–25)
CALCIUM SPEC-SCNC: 8.8 MG/DL (ref 8.6–10.5)
CHLORIDE SERPL-SCNC: 93 MMOL/L (ref 98–107)
CO2 SERPL-SCNC: 25.7 MMOL/L (ref 22–29)
CREAT SERPL-MCNC: 0.99 MG/DL (ref 0.76–1.27)
CRP SERPL-MCNC: 2.12 MG/DL (ref 0–0.5)
DEPRECATED RDW RBC AUTO: 44.9 FL (ref 37–54)
ERYTHROCYTE [DISTWIDTH] IN BLOOD BY AUTOMATED COUNT: 15.9 % (ref 12.3–15.4)
GFR SERPL CREATININE-BSD FRML MDRD: 83 ML/MIN/1.73
GLUCOSE BLDC GLUCOMTR-MCNC: 410 MG/DL (ref 70–130)
GLUCOSE BLDC GLUCOMTR-MCNC: 412 MG/DL (ref 70–130)
GLUCOSE BLDC GLUCOMTR-MCNC: 450 MG/DL (ref 70–130)
GLUCOSE BLDC GLUCOMTR-MCNC: 476 MG/DL (ref 70–130)
GLUCOSE SERPL-MCNC: 453 MG/DL (ref 65–99)
HCT VFR BLD AUTO: 30.2 % (ref 37.5–51)
HGB BLD-MCNC: 8 G/DL (ref 13–17.7)
MCH RBC QN AUTO: 21 PG (ref 26.6–33)
MCHC RBC AUTO-ENTMCNC: 26.5 G/DL (ref 31.5–35.7)
MCV RBC AUTO: 79.3 FL (ref 79–97)
PLATELET # BLD AUTO: 578 10*3/MM3 (ref 140–450)
PMV BLD AUTO: 9.6 FL (ref 6–12)
POTASSIUM SERPL-SCNC: 4.3 MMOL/L (ref 3.5–5.2)
RBC # BLD AUTO: 3.81 10*6/MM3 (ref 4.14–5.8)
SODIUM SERPL-SCNC: 131 MMOL/L (ref 136–145)
WBC # BLD AUTO: 10.73 10*3/MM3 (ref 3.4–10.8)

## 2021-06-09 PROCEDURE — 25010000002 CEFEPIME PER 500 MG: Performed by: PHYSICIAN ASSISTANT

## 2021-06-09 PROCEDURE — 82962 GLUCOSE BLOOD TEST: CPT

## 2021-06-09 PROCEDURE — 25010000002 MICAFUNGIN SODIUM 100 MG RECONSTITUTED SOLUTION 1 EACH VIAL: Performed by: INTERNAL MEDICINE

## 2021-06-09 PROCEDURE — 63710000001 INSULIN ASPART PER 5 UNITS: Performed by: INTERNAL MEDICINE

## 2021-06-09 PROCEDURE — 80048 BASIC METABOLIC PNL TOTAL CA: CPT | Performed by: INTERNAL MEDICINE

## 2021-06-09 PROCEDURE — 25010000002 CEFEPIME PER 500 MG: Performed by: INTERNAL MEDICINE

## 2021-06-09 PROCEDURE — 99232 SBSQ HOSP IP/OBS MODERATE 35: CPT | Performed by: INTERNAL MEDICINE

## 2021-06-09 PROCEDURE — 99232 SBSQ HOSP IP/OBS MODERATE 35: CPT | Performed by: SPECIALIST

## 2021-06-09 PROCEDURE — 63710000001 INSULIN DETEMIR PER 5 UNITS: Performed by: INTERNAL MEDICINE

## 2021-06-09 PROCEDURE — 25010000002 ENOXAPARIN PER 10 MG: Performed by: INTERNAL MEDICINE

## 2021-06-09 PROCEDURE — 85027 COMPLETE CBC AUTOMATED: CPT | Performed by: INTERNAL MEDICINE

## 2021-06-09 PROCEDURE — 63710000001 INSULIN ASPART PER 5 UNITS: Performed by: PHYSICIAN ASSISTANT

## 2021-06-09 PROCEDURE — 86140 C-REACTIVE PROTEIN: CPT | Performed by: PHYSICIAN ASSISTANT

## 2021-06-09 RX ORDER — CARVEDILOL 6.25 MG/1
6.25 TABLET ORAL 2 TIMES DAILY WITH MEALS
Status: DISCONTINUED | OUTPATIENT
Start: 2021-06-09 | End: 2021-06-14 | Stop reason: HOSPADM

## 2021-06-09 RX ADMIN — VALSARTAN 40 MG: 80 TABLET ORAL at 09:01

## 2021-06-09 RX ADMIN — INSULIN ASPART 14 UNITS: 100 INJECTION, SOLUTION INTRAVENOUS; SUBCUTANEOUS at 06:40

## 2021-06-09 RX ADMIN — INSULIN ASPART 12 UNITS: 100 INJECTION, SOLUTION INTRAVENOUS; SUBCUTANEOUS at 09:02

## 2021-06-09 RX ADMIN — CARVEDILOL 6.25 MG: 6.25 TABLET, FILM COATED ORAL at 17:01

## 2021-06-09 RX ADMIN — PANTOPRAZOLE SODIUM 40 MG: 40 TABLET, DELAYED RELEASE ORAL at 04:34

## 2021-06-09 RX ADMIN — INSULIN ASPART 10 UNITS: 100 INJECTION, SOLUTION INTRAVENOUS; SUBCUTANEOUS at 02:13

## 2021-06-09 RX ADMIN — MICAFUNGIN SODIUM 100 MG: 100 INJECTION, POWDER, LYOPHILIZED, FOR SOLUTION INTRAVENOUS at 09:03

## 2021-06-09 RX ADMIN — SODIUM HYPOCHLORITE: 1.25 SOLUTION TOPICAL at 20:53

## 2021-06-09 RX ADMIN — BACLOFEN 30 MG: 10 TABLET ORAL at 12:18

## 2021-06-09 RX ADMIN — SODIUM CHLORIDE, PRESERVATIVE FREE 10 ML: 5 INJECTION INTRAVENOUS at 20:53

## 2021-06-09 RX ADMIN — METHENAMINE HIPPURATE 1 G: 1 TABLET ORAL at 09:03

## 2021-06-09 RX ADMIN — GABAPENTIN 800 MG: 400 CAPSULE ORAL at 09:02

## 2021-06-09 RX ADMIN — SODIUM CHLORIDE, PRESERVATIVE FREE 10 ML: 5 INJECTION INTRAVENOUS at 09:04

## 2021-06-09 RX ADMIN — BACLOFEN 30 MG: 10 TABLET ORAL at 17:01

## 2021-06-09 RX ADMIN — METHENAMINE HIPPURATE 1 G: 1 TABLET ORAL at 17:01

## 2021-06-09 RX ADMIN — INSULIN ASPART 10 UNITS: 100 INJECTION, SOLUTION INTRAVENOUS; SUBCUTANEOUS at 21:52

## 2021-06-09 RX ADMIN — CEFEPIME HYDROCHLORIDE 2 G: 1 INJECTION, POWDER, FOR SOLUTION INTRAMUSCULAR; INTRAVENOUS at 04:34

## 2021-06-09 RX ADMIN — INSULIN ASPART 12 UNITS: 100 INJECTION, SOLUTION INTRAVENOUS; SUBCUTANEOUS at 12:17

## 2021-06-09 RX ADMIN — SODIUM HYPOCHLORITE: 1.25 SOLUTION TOPICAL at 09:06

## 2021-06-09 RX ADMIN — BACLOFEN 30 MG: 10 TABLET ORAL at 20:50

## 2021-06-09 RX ADMIN — DULOXETINE HYDROCHLORIDE 60 MG: 60 CAPSULE, DELAYED RELEASE ORAL at 09:01

## 2021-06-09 RX ADMIN — INSULIN ASPART 14 UNITS: 100 INJECTION, SOLUTION INTRAVENOUS; SUBCUTANEOUS at 18:04

## 2021-06-09 RX ADMIN — MODAFINIL 200 MG: 100 TABLET ORAL at 09:01

## 2021-06-09 RX ADMIN — INSULIN DETEMIR 50 UNITS: 100 INJECTION, SOLUTION SUBCUTANEOUS at 09:07

## 2021-06-09 RX ADMIN — Medication 1 CAPSULE: at 09:01

## 2021-06-09 RX ADMIN — ATORVASTATIN CALCIUM 10 MG: 10 TABLET, FILM COATED ORAL at 20:50

## 2021-06-09 RX ADMIN — ARIPIPRAZOLE 10 MG: 10 TABLET ORAL at 20:50

## 2021-06-09 RX ADMIN — CEFEPIME HYDROCHLORIDE 2 G: 1 INJECTION, POWDER, FOR SOLUTION INTRAMUSCULAR; INTRAVENOUS at 12:17

## 2021-06-09 RX ADMIN — BACLOFEN 30 MG: 10 TABLET ORAL at 09:02

## 2021-06-09 RX ADMIN — DULOXETINE HYDROCHLORIDE 60 MG: 60 CAPSULE, DELAYED RELEASE ORAL at 20:50

## 2021-06-09 RX ADMIN — GABAPENTIN 800 MG: 400 CAPSULE ORAL at 16:53

## 2021-06-09 RX ADMIN — ENOXAPARIN SODIUM 120 MG: 60 INJECTION SUBCUTANEOUS at 12:17

## 2021-06-09 RX ADMIN — GABAPENTIN 800 MG: 400 CAPSULE ORAL at 20:49

## 2021-06-09 RX ADMIN — SACUBITRIL AND VALSARTAN 1 TABLET: 24; 26 TABLET, FILM COATED ORAL at 22:01

## 2021-06-09 RX ADMIN — CEFEPIME HYDROCHLORIDE 2 G: 1 INJECTION, POWDER, FOR SOLUTION INTRAMUSCULAR; INTRAVENOUS at 20:52

## 2021-06-09 RX ADMIN — INSULIN ASPART 12 UNITS: 100 INJECTION, SOLUTION INTRAVENOUS; SUBCUTANEOUS at 17:01

## 2021-06-09 RX ADMIN — CHOLECALCIFEROL TAB 10 MCG (400 UNIT) 2000 UNITS: 10 TAB at 09:00

## 2021-06-09 RX ADMIN — METOPROLOL SUCCINATE 25 MG: 25 TABLET, FILM COATED, EXTENDED RELEASE ORAL at 09:01

## 2021-06-09 NOTE — PROGRESS NOTES
PROGRESS NOTE         Patient Identification:  Name:  Ken Krueger  Age:  45 y.o.  Sex:  male  :  1975  MRN:  [id number]  Visit Number:  [id number]  Primary Care Provider:  Franchesca Hodges APRN         LOS: 7 days       ----------------------------------------------------------------------------------------------------------------------  Subjective       Chief Complaints:    Chest Pain and Fever        Interval History:      The patient is resting comfortably in bed on 3 L nasal cannula in no apparent distress.  CNA is at bedside.  The patient denies any complaints at this time including shortness of breath, chest pain, abdominal pain, nausea, vomiting, or increased output from ostomy.  Afebrile.  CRP is improving at 2.12.  WBC remains normal. Blood culture on 2021 is so far showing no growth.  Blood cultures on 2021 are in process.    Review of Systems:    Constitutional: no fever, chills and night sweats. No appetite change or unexpected weight change. No fatigue.  Eyes: no eye drainage, itching or redness.  HEENT: no mouth sores, dysphagia or nose bleed.  Respiratory: no for shortness of breath, cough or production of sputum.  Cardiovascular: no chest pain, no palpitations, no orthopnea.  Gastrointestinal: no nausea, vomiting or diarrhea. No abdominal pain, hematemesis or rectal bleeding.  Genitourinary: no dysuria or polyuria.  Hematologic/lymphatic: no lymph node abnormalities, no easy bruising or easy bleeding.  Musculoskeletal: no muscle or joint pain.  Left AKA.  Skin: No rash and no itching.  Sacral wounds.  Neurological: no loss of consciousness, no seizure, no headache.  Paraplegia.  Behavioral/Psych: no depression or suicidal ideation.  Endocrine: no hot flashes.  Immunologic: negative.  ----------------------------------------------------------------------------------------------------------------------      Objective       Current Hospital Meds:  ARIPiprazole, 10 mg, Oral,  Nightly  atorvastatin, 10 mg, Oral, Nightly  baclofen, 30 mg, Oral, 4x Daily With Meals & Nightly  cefepime, 2 g, Intravenous, Q8H  cholecalciferol, 2,000 Units, Oral, Daily  DULoxetine, 60 mg, Oral, BID  enoxaparin, 1 mg/kg, Subcutaneous, Q12H  gabapentin, 800 mg, Oral, TID  insulin aspart, 0-14 Units, Subcutaneous, TID AC  insulin aspart, 12 Units, Subcutaneous, TID With Meals  insulin detemir, 50 Units, Subcutaneous, Daily  lactobacillus acidophilus, 1 capsule, Oral, Daily  methenamine, 1 g, Oral, BID With Meals  metoprolol succinate XL, 25 mg, Oral, Q24H  micafungin (MYCAMINE) IV, 100 mg, Intravenous, Q24H  modafinil, 200 mg, Oral, Daily  pantoprazole, 40 mg, Oral, Q AM  sodium chloride, 10 mL, Intravenous, Q12H  sodium hypochlorite, , Topical, Q12H  valsartan, 40 mg, Oral, Q24H         ----------------------------------------------------------------------------------------------------------------------    Vital Signs:  Temp:  [97.6 °F (36.4 °C)-98 °F (36.7 °C)] 97.9 °F (36.6 °C)  Heart Rate:  [64-77] 67  Resp:  [16-20] 16  BP: ()/(45-67) 111/63  Mean Arterial Pressure (Non-Invasive) for the past 24 hrs (Last 3 readings):   Noninvasive MAP (mmHg)   06/09/21 0618 77     SpO2 Percentage    06/08/21 0956 06/08/21 1900 06/08/21 2331   SpO2: 99% 95% 97%     SpO2:  [89 %-100 %] 97 %  on  Flow (L/min):  [3-6] 3;   Device (Oxygen Therapy): nasal cannula    Body mass index is 36.82 kg/m².  Wt Readings from Last 3 Encounters:   06/04/21 120 kg (264 lb)   02/15/21 126 kg (277 lb 12.5 oz)   06/02/20 (!) 150 kg (330 lb)        Intake/Output Summary (Last 24 hours) at 6/9/2021 0917  Last data filed at 6/9/2021 0800  Gross per 24 hour   Intake 2150 ml   Output 3600 ml   Net -1450 ml     Diet Regular; Thin; Consistent Carbohydrate  ----------------------------------------------------------------------------------------------------------------------      Physical Exam:    Constitutional: Morbidly obese  male is  resting comfortably in bed in no apparent distress.  Paraplegic.  HENT:  Head: Normocephalic and atraumatic.  Mouth:  Moist mucous membranes.    Eyes:  Conjunctivae and EOM are normal.  No scleral icterus.  Neck:  Neck supple.  No JVD present.    Cardiovascular:  Normal rate, regular rhythm and normal heart sounds with no murmur. No edema.  Pulmonary/Chest:  No respiratory distress, no wheezes, no crackles, with normal breath sounds and good air movement.  Abdominal:  Soft.  Bowel sounds are normal. No tenderness or distention. Colostomy and ileostomy.  Morbidly obese.  Musculoskeletal: Left AKA, right leg atrophy.  Neurological:  Alert and oriented to person, place, and time.  No facial droop.  No slurred speech.  Paraplegic.  Skin:  Decubitus ulcers.  Psychiatric:  Normal mood and affect.  Behavior is normal.  ----------------------------------------------------------------------------------------------------------------------  Results from last 7 days   Lab Units 06/03/21  1728 06/02/21  2328 06/02/21  2110   CK TOTAL U/L  --   --  22   TROPONIN T ng/mL <0.010 <0.010 <0.010     Results from last 7 days   Lab Units 06/02/21  2110   PROBNP pg/mL 1,812.0*         Results from last 7 days   Lab Units 06/09/21  0055 06/08/21  1230 06/08/21  0055 06/07/21  0406 06/06/21  0227 06/03/21  0058 06/02/21  2110   CRP mg/dL 2.12*  --  3.49* 4.88*  --   --  14.87*   LACTATE mmol/L  --   --   --   --   --  2.0 5.8*   WBC 10*3/mm3 10.73 7.37  --   --  7.15  --  10.08   HEMOGLOBIN g/dL 8.0* 9.8*  --   --  8.4*  --  8.5*   HEMATOCRIT % 30.2* 35.8*  --   --  30.7*  --  30.0*   MCV fL 79.3 78.0*  --   --  77.5*  --  78.3*   MCHC g/dL 26.5* 27.4*  --   --  27.4*  --  28.3*   PLATELETS 10*3/mm3 578* 596*  --   --  579*  --  488*     Results from last 7 days   Lab Units 06/09/21  0055 06/08/21  1230 06/08/21  0055 06/07/21  0406 06/06/21  0227 06/03/21  0710 06/03/21  0317 06/02/21  2110 06/02/21  2110   SODIUM mmol/L 131* 133*  --    --  136   < > 132*  --  128*   POTASSIUM mmol/L 4.3 4.2  --   --  3.6   < > 3.4*  --  3.9   MAGNESIUM mg/dL  --   --  2.1 1.6 1.7  --   --    < > 1.4*   CHLORIDE mmol/L 93* 93*  --   --  100   < > 99  --  92*   CO2 mmol/L 25.7 27.0  --   --  26.3   < > 21.9*  --  20.5*   BUN mg/dL 25* 16  --   --  9   < > 3*  --  3*   CREATININE mg/dL 0.99 0.63*  --   --  0.58*   < > 0.62*  --  0.80   EGFR IF NONAFRICN AM mL/min/1.73 83 139  --   --  >150   < > 142  --  106   CALCIUM mg/dL 8.8 9.2  --   --  8.4*   < > 8.3*  --  8.7   GLUCOSE mg/dL 453* 333*  --   --  234*   < > 362*  --  614*   ALBUMIN g/dL  --   --   --   --   --   --  2.29*  --  2.69*   BILIRUBIN mg/dL  --   --   --   --   --   --  0.3  --  0.3   ALK PHOS U/L  --   --   --   --   --   --  133*  --  157*   AST (SGOT) U/L  --   --   --   --   --   --  19  --  27   ALT (SGPT) U/L  --   --   --   --   --   --  22  --  26    < > = values in this interval not displayed.   Estimated Creatinine Clearance: 126.8 mL/min (by C-G formula based on SCr of 0.99 mg/dL).  No results found for: AMMONIA    Glucose   Date/Time Value Ref Range Status   06/09/2021 0623 412 (C) 70 - 130 mg/dL Final   06/08/2021 2051 216 (H) 70 - 130 mg/dL Final   06/08/2021 1627 314 (H) 70 - 130 mg/dL Final   06/08/2021 1034 316 (H) 70 - 130 mg/dL Final   06/08/2021 0621 345 (H) 70 - 130 mg/dL Final   06/07/2021 2227 330 (H) 70 - 130 mg/dL Final   06/07/2021 2026 363 (H) 70 - 130 mg/dL Final   06/07/2021 1653 219 (H) 70 - 130 mg/dL Final     Lab Results   Component Value Date    HGBA1C 10.80 (H) 02/17/2021     Lab Results   Component Value Date    TSH 0.876 06/02/2021    FREET4 1.31 06/02/2021       Blood Culture   Date Value Ref Range Status   06/04/2021 No growth at 4 days  Preliminary   06/02/2021 Streptococcus agalactiae (Group B) (C)  Final   06/02/2021 Streptococcus agalactiae (Group B) (C)  Preliminary     Comment:     Refer to previous blood culture collected on 6/2/2021 at 2157 for MICs.    06/02/2021 Candida glabrata (C)  Corrected     Comment:     Infectious disease consultation is highly recommended to rule out distant foci of infection.    Fluconazole susceptibility to follow.  Appended report. These results have been appended to a previously final verified report.     Urine Culture   Date Value Ref Range Status   06/02/2021 >100,000 CFU/mL Serratia marcescens (A)  Final     Comment:     Please call the lab if pip/devorah is requested for Serratia spp. Will have to be set up by disk diffusion.       No results found for: WOUNDCX  No results found for: STOOLCX  No results found for: RESPCX  Pain Management Panel       Pain Management Panel Latest Ref Rng & Units 6/2/2021 8/19/2018    AMPHETAMINES SCREEN, URINE Negative Negative Negative    BARBITURATES SCREEN Negative Negative Negative    BENZODIAZEPINE SCREEN, URINE Negative Negative Negative    BUPRENORPHINEUR Negative Negative Negative    COCAINE SCREEN, URINE Negative Negative Negative    METHADONE SCREEN, URINE Negative Negative Negative              ----------------------------------------------------------------------------------------------------------------------  Imaging Results (Last 24 Hours)       ** No results found for the last 24 hours. **            ----------------------------------------------------------------------------------------------------------------------    Assessment/Plan       Assessment/Plan     ASSESSMENT:    1.  Septic shock with lactic acid greater than 4 on admission  2.  Osteomyelitis  3.  UTI  4.  Bacteremia    PLAN:    The patient is resting comfortably in bed on 3 L nasal cannula in no apparent distress.  CNA is at bedside.  The patient denies any complaints at this time including shortness of breath, chest pain, abdominal pain, nausea, vomiting, or increased output from ostomy.  Afebrile.  CRP is improving at 2.12.  WBC remains normal. Blood culture on 6/4/2021 is so far showing no growth.  Blood cultures on  6/8/2021 are in process.    VISHAL on 6/8/2021 revealed no echocardiographic evidence of any endocardial vegetations or aortic root dilatation.    BC ID on 6/2/2021 finalized as Candida glabrata.  Another BC ID on 6/2/2021 finalized as group B strep.  2 out of 2 blood cultures on 6/2/2021 finalized as group B strep and 1 out of the 2 blood cultures also finalized with yeast.  1 blood culture on 6/4/2021 is so far showing no growth.  Urine culture on 6/2/2021 finalized as greater than 100,000 colonies of Serratia marcescens.  COVID-19 and flu A/B PCR on 6/3/2021 was negative.  MRI of the pelvis without contrast on 6/4/2021 showed bilateral ischial tuberosity region decubitus ulcers with surrounding soft tissue inflammation/infection but no loculated abscess.  Abnormal signal changes of the inferior pubic rami bilaterally consistent with osteomyelitis.  Secondary myositis of the obturator and abductor muscle groups.  Chronic bilateral hip findings.  No change from previous.  Enlarged probable reactive bilateral inguinal lymph nodes.  CT abdomen pelvis with contrast on 6/3/2021 showed correlation with detailed surgical history is recommended.  Large bilateral ulcers in the ischial regions are unchanged from prior.  No drainable fluid collection is seen.  There is an ileal conduit at least draining the left kidney if not both.  There is mild left hydronephrosis today.  Distended urinary bladder.  The prostate gland may be surgically absent.  There does appear to be a cystocele with the bladder extending below the pelvic floor.  Descending colostomy.  No evidence of acute colitis, and no small bowel obstruction.  Cholecystectomy.      Cefepime and micafungin are appropriate coverage and patient will require a prolonged course of IV antibiotic therapy x6 weeks.  Would recommend to repeat blood cultures x2 sets until clearing.  Recommend close surgical follow-up for possible closure.    Code Status:   Code Status and Medical  Interventions:   Ordered at: 06/02/21 8326     Code Status:    CPR     Medical Interventions (Level of Support Prior to Arrest):    KAYLAH Pulido  06/09/21  09:17 EDT

## 2021-06-09 NOTE — PLAN OF CARE
Goal Outcome Evaluation:  Plan of Care Reviewed With: patient        Progress: improving  Outcome Summary:  in am labs. PA notified. 10u given. wound care complete. will continue to monitor
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
## 2021-06-09 NOTE — CASE MANAGEMENT/SOCIAL WORK
Discharge Planning Assessment   Fabricio     Patient Name: Ken Krueger  MRN: [id number]  Today's Date: 6/9/2021    Admit Date: 6/2/2021    Discharge Needs Assessment    No documentation.       Discharge Plan     Row Name 06/09/21 1433       Plan    Plan Pt was admitted on 06/02/21. SS spoke with Physican about discharge planning on this date. Pt plans on returning home with his brother. Pt's brother and mother to assist with IV antibiotics at discharge. [facility] utilizes [facility]. [facility] [phone] to be contacted at discharge. [facility] will need a new HH order at discharge to resume care. [facility] [phone] to be contacted at discharge. SS to follow.    Row Name 06/09/21 1234       Evelyn Deras

## 2021-06-09 NOTE — ANESTHESIA POSTPROCEDURE EVALUATION
Patient: Ken Krueger    Procedure Summary     Date: 06/08/21 Room / Location: [facility] NONINVASIVE LAB    Anesthesia Start: 0910 Anesthesia Stop: 0928    Procedure: ADULT TRANSESOPHAGEAL ECHO (VISHAL) W/ CONT IF NECESSARY PER PROTOCOL Diagnosis: (Endocarditis)    Scheduled Providers:  Provider: Ronal De Santiago MD    Anesthesia Type: general ASA Status: 3          Anesthesia Type: general    Vitals  Vitals Value Taken Time   /63 06/09/21 0618   Temp 97.9 °F (36.6 °C) 06/08/21 1900   Pulse 67 06/09/21 0618   Resp 16 06/09/21 0618   SpO2 97 % 06/08/21 2331           Post Anesthesia Care and Evaluation    Patient location during evaluation: bedside  Patient participation: complete - patient participated  Level of consciousness: awake and alert  Pain score: 1  Pain management: adequate  Airway patency: patent  Anesthetic complications: No anesthetic complications  PONV Status: none  Cardiovascular status: acceptable  Respiratory status: acceptable  Hydration status: acceptable

## 2021-06-09 NOTE — PLAN OF CARE
Goal Outcome Evaluation:  Plan of Care Reviewed With: patient        Progress: improving  Outcome Summary: Pt has no complaints, no s/s of distress noted. BS still elevated. Con't IV ABX/wound care. Will con't to monitor.

## 2021-06-09 NOTE — PHARMACY PATIENT ASSISTANCE
Pharmacy was consulted for cost of Entresto. Per patient's insurance, he will have a $0 copay.    Thank you,  Anna Carlson, PharmD

## 2021-06-10 LAB
ANION GAP SERPL CALCULATED.3IONS-SCNC: 9 MMOL/L (ref 5–15)
BUN SERPL-MCNC: 26 MG/DL (ref 6–20)
BUN/CREAT SERPL: 38.2 (ref 7–25)
CALCIUM SPEC-SCNC: 9.5 MG/DL (ref 8.6–10.5)
CHLORIDE SERPL-SCNC: 97 MMOL/L (ref 98–107)
CO2 SERPL-SCNC: 29 MMOL/L (ref 22–29)
CREAT SERPL-MCNC: 0.68 MG/DL (ref 0.76–1.27)
CRP SERPL-MCNC: 1.53 MG/DL (ref 0–0.5)
DEPRECATED RDW RBC AUTO: 45.8 FL (ref 37–54)
ERYTHROCYTE [DISTWIDTH] IN BLOOD BY AUTOMATED COUNT: 15.8 % (ref 12.3–15.4)
GFR SERPL CREATININE-BSD FRML MDRD: 127 ML/MIN/1.73
GLUCOSE BLDC GLUCOMTR-MCNC: 243 MG/DL (ref 70–130)
GLUCOSE BLDC GLUCOMTR-MCNC: 313 MG/DL (ref 70–130)
GLUCOSE BLDC GLUCOMTR-MCNC: 321 MG/DL (ref 70–130)
GLUCOSE BLDC GLUCOMTR-MCNC: 359 MG/DL (ref 70–130)
GLUCOSE BLDC GLUCOMTR-MCNC: 397 MG/DL (ref 70–130)
GLUCOSE SERPL-MCNC: 430 MG/DL (ref 65–99)
HCT VFR BLD AUTO: 33.6 % (ref 37.5–51)
HGB BLD-MCNC: 8.8 G/DL (ref 13–17.7)
MCH RBC QN AUTO: 21.3 PG (ref 26.6–33)
MCHC RBC AUTO-ENTMCNC: 26.2 G/DL (ref 31.5–35.7)
MCV RBC AUTO: 81.2 FL (ref 79–97)
PLATELET # BLD AUTO: 588 10*3/MM3 (ref 140–450)
PMV BLD AUTO: 9.7 FL (ref 6–12)
POTASSIUM SERPL-SCNC: 4.7 MMOL/L (ref 3.5–5.2)
RBC # BLD AUTO: 4.14 10*6/MM3 (ref 4.14–5.8)
SODIUM SERPL-SCNC: 135 MMOL/L (ref 136–145)
WBC # BLD AUTO: 7.43 10*3/MM3 (ref 3.4–10.8)

## 2021-06-10 PROCEDURE — 25010000002 MICAFUNGIN SODIUM 100 MG RECONSTITUTED SOLUTION 1 EACH VIAL: Performed by: PHYSICIAN ASSISTANT

## 2021-06-10 PROCEDURE — 25010000002 CEFEPIME PER 500 MG: Performed by: PHYSICIAN ASSISTANT

## 2021-06-10 PROCEDURE — 25010000002 ENOXAPARIN PER 10 MG: Performed by: INTERNAL MEDICINE

## 2021-06-10 PROCEDURE — 99232 SBSQ HOSP IP/OBS MODERATE 35: CPT | Performed by: INTERNAL MEDICINE

## 2021-06-10 PROCEDURE — 82962 GLUCOSE BLOOD TEST: CPT

## 2021-06-10 PROCEDURE — 99231 SBSQ HOSP IP/OBS SF/LOW 25: CPT | Performed by: INTERNAL MEDICINE

## 2021-06-10 PROCEDURE — 63710000001 INSULIN DETEMIR PER 5 UNITS: Performed by: INTERNAL MEDICINE

## 2021-06-10 PROCEDURE — 63710000001 INSULIN ASPART PER 5 UNITS: Performed by: INTERNAL MEDICINE

## 2021-06-10 PROCEDURE — 85027 COMPLETE CBC AUTOMATED: CPT | Performed by: INTERNAL MEDICINE

## 2021-06-10 PROCEDURE — 63710000001 INSULIN ASPART PER 5 UNITS: Performed by: PHYSICIAN ASSISTANT

## 2021-06-10 PROCEDURE — 86140 C-REACTIVE PROTEIN: CPT | Performed by: PHYSICIAN ASSISTANT

## 2021-06-10 PROCEDURE — 80048 BASIC METABOLIC PNL TOTAL CA: CPT | Performed by: INTERNAL MEDICINE

## 2021-06-10 RX ADMIN — ENOXAPARIN SODIUM 120 MG: 60 INJECTION SUBCUTANEOUS at 23:26

## 2021-06-10 RX ADMIN — DULOXETINE HYDROCHLORIDE 60 MG: 60 CAPSULE, DELAYED RELEASE ORAL at 20:03

## 2021-06-10 RX ADMIN — SACUBITRIL AND VALSARTAN 1 TABLET: 24; 26 TABLET, FILM COATED ORAL at 09:22

## 2021-06-10 RX ADMIN — METHENAMINE HIPPURATE 1 G: 1 TABLET ORAL at 18:30

## 2021-06-10 RX ADMIN — BACLOFEN 30 MG: 10 TABLET ORAL at 18:29

## 2021-06-10 RX ADMIN — INSULIN ASPART 20 UNITS: 100 INJECTION, SOLUTION INTRAVENOUS; SUBCUTANEOUS at 12:17

## 2021-06-10 RX ADMIN — CEFEPIME HYDROCHLORIDE 2 G: 1 INJECTION, POWDER, FOR SOLUTION INTRAMUSCULAR; INTRAVENOUS at 12:16

## 2021-06-10 RX ADMIN — BACLOFEN 30 MG: 10 TABLET ORAL at 09:21

## 2021-06-10 RX ADMIN — INSULIN ASPART 12 UNITS: 100 INJECTION, SOLUTION INTRAVENOUS; SUBCUTANEOUS at 12:16

## 2021-06-10 RX ADMIN — DULOXETINE HYDROCHLORIDE 60 MG: 60 CAPSULE, DELAYED RELEASE ORAL at 09:22

## 2021-06-10 RX ADMIN — INSULIN ASPART 20 UNITS: 100 INJECTION, SOLUTION INTRAVENOUS; SUBCUTANEOUS at 18:30

## 2021-06-10 RX ADMIN — ATORVASTATIN CALCIUM 10 MG: 10 TABLET, FILM COATED ORAL at 20:03

## 2021-06-10 RX ADMIN — SODIUM HYPOCHLORITE: 1.25 SOLUTION TOPICAL at 09:23

## 2021-06-10 RX ADMIN — ENOXAPARIN SODIUM 120 MG: 60 INJECTION SUBCUTANEOUS at 12:16

## 2021-06-10 RX ADMIN — SODIUM HYPOCHLORITE: 1.25 SOLUTION TOPICAL at 20:04

## 2021-06-10 RX ADMIN — INSULIN ASPART 20 UNITS: 100 INJECTION, SOLUTION INTRAVENOUS; SUBCUTANEOUS at 09:22

## 2021-06-10 RX ADMIN — GABAPENTIN 800 MG: 400 CAPSULE ORAL at 16:43

## 2021-06-10 RX ADMIN — INSULIN DETEMIR 60 UNITS: 100 INJECTION, SOLUTION SUBCUTANEOUS at 09:24

## 2021-06-10 RX ADMIN — BACLOFEN 30 MG: 10 TABLET ORAL at 20:03

## 2021-06-10 RX ADMIN — MICAFUNGIN SODIUM 100 MG: 100 INJECTION, POWDER, LYOPHILIZED, FOR SOLUTION INTRAVENOUS at 09:22

## 2021-06-10 RX ADMIN — CARVEDILOL 6.25 MG: 6.25 TABLET, FILM COATED ORAL at 09:22

## 2021-06-10 RX ADMIN — GABAPENTIN 800 MG: 400 CAPSULE ORAL at 20:03

## 2021-06-10 RX ADMIN — CEFEPIME HYDROCHLORIDE 2 G: 1 INJECTION, POWDER, FOR SOLUTION INTRAMUSCULAR; INTRAVENOUS at 04:06

## 2021-06-10 RX ADMIN — ENOXAPARIN SODIUM 120 MG: 60 INJECTION SUBCUTANEOUS at 00:18

## 2021-06-10 RX ADMIN — METHENAMINE HIPPURATE 1 G: 1 TABLET ORAL at 09:23

## 2021-06-10 RX ADMIN — INSULIN ASPART 10 UNITS: 100 INJECTION, SOLUTION INTRAVENOUS; SUBCUTANEOUS at 09:21

## 2021-06-10 RX ADMIN — SODIUM CHLORIDE, PRESERVATIVE FREE 10 ML: 5 INJECTION INTRAVENOUS at 20:04

## 2021-06-10 RX ADMIN — CEFEPIME HYDROCHLORIDE 2 G: 1 INJECTION, POWDER, FOR SOLUTION INTRAMUSCULAR; INTRAVENOUS at 20:03

## 2021-06-10 RX ADMIN — INSULIN ASPART 7 UNITS: 100 INJECTION, SOLUTION INTRAVENOUS; SUBCUTANEOUS at 02:02

## 2021-06-10 RX ADMIN — SACUBITRIL AND VALSARTAN 1 TABLET: 24; 26 TABLET, FILM COATED ORAL at 20:03

## 2021-06-10 RX ADMIN — PANTOPRAZOLE SODIUM 40 MG: 40 TABLET, DELAYED RELEASE ORAL at 05:22

## 2021-06-10 RX ADMIN — GABAPENTIN 800 MG: 400 CAPSULE ORAL at 09:21

## 2021-06-10 RX ADMIN — ARIPIPRAZOLE 10 MG: 10 TABLET ORAL at 20:03

## 2021-06-10 RX ADMIN — CHOLECALCIFEROL TAB 10 MCG (400 UNIT) 2000 UNITS: 10 TAB at 09:21

## 2021-06-10 RX ADMIN — MODAFINIL 200 MG: 100 TABLET ORAL at 09:22

## 2021-06-10 RX ADMIN — BACLOFEN 30 MG: 10 TABLET ORAL at 12:16

## 2021-06-10 RX ADMIN — SODIUM CHLORIDE, PRESERVATIVE FREE 10 ML: 5 INJECTION INTRAVENOUS at 09:23

## 2021-06-10 RX ADMIN — INSULIN ASPART 5 UNITS: 100 INJECTION, SOLUTION INTRAVENOUS; SUBCUTANEOUS at 18:29

## 2021-06-10 RX ADMIN — CARVEDILOL 6.25 MG: 6.25 TABLET, FILM COATED ORAL at 18:29

## 2021-06-10 NOTE — CASE MANAGEMENT/SOCIAL WORK
Discharge Planning Assessment   Fabricio     Patient Name: Ken Krueger  MRN: [id number]  Today's Date: 6/10/2021    Admit Date: 6/2/2021    Discharge Needs Assessment    No documentation.       Discharge Plan     Row Name 06/10/21 1539       Plan    Plan  Pt was discussed in [facility] rounds. Pt plans on returning home with his brother at discharge. Pt's brother and mother to assist with IV antibiotics at discharge. [facility] utilizes [facility]. [facility] [phone] to be contacted at discharge. [facility] will need a new order at discharge to resume care. [facility] per [phone] to be contacted at discharge. SS to follow.          Evelyn Deras
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
## 2021-06-10 NOTE — PROGRESS NOTES
PROGRESS NOTE         Patient Identification:  Name:  Ken Krueger  Age:  45 y.o.  Sex:  male  :  1975  MRN:  [id number]  Visit Number:  [id number]  Primary Care Provider:  Franchesca Hodges APRN         LOS: 8 days       ----------------------------------------------------------------------------------------------------------------------  Subjective       Chief Complaints:    Chest Pain and Fever        Interval History:      Patient resting comfortably in bed this morning. No issues or complaints. Afebrile, no diarrhea. WBC normal. CRP improving at 1.53.    Review of Systems:    Constitutional: no fever, chills and night sweats. No appetite change or unexpected weight change. No fatigue.  Eyes: no eye drainage, itching or redness.  HEENT: no mouth sores, dysphagia or nose bleed.  Respiratory: no for shortness of breath, cough or production of sputum.  Cardiovascular: no chest pain, no palpitations, no orthopnea.  Gastrointestinal: no nausea, vomiting or diarrhea. No abdominal pain, hematemesis or rectal bleeding.  Genitourinary: no dysuria or polyuria.  Hematologic/lymphatic: no lymph node abnormalities, no easy bruising or easy bleeding.  Musculoskeletal: no muscle or joint pain.  Left AKA.  Skin: No rash and no itching.  Sacral wounds.  Neurological: no loss of consciousness, no seizure, no headache.  Paraplegia.  Behavioral/Psych: no depression or suicidal ideation.  Endocrine: no hot flashes.  Immunologic: negative.  ----------------------------------------------------------------------------------------------------------------------      Objective       Current [facility] Meds:  ARIPiprazole, 10 mg, Oral, Nightly  atorvastatin, 10 mg, Oral, Nightly  baclofen, 30 mg, Oral, 4x Daily With Meals & Nightly  carvedilol, 6.25 mg, Oral, BID With Meals  cefepime, 2 g, Intravenous, Q8H  cholecalciferol, 2,000 Units, Oral, Daily  DULoxetine, 60 mg, Oral, BID  enoxaparin, 1 mg/kg, Subcutaneous,  Q12H  gabapentin, 800 mg, Oral, TID  insulin aspart, 0-14 Units, Subcutaneous, TID PC  insulin aspart, 20 Units, Subcutaneous, TID With Meals  insulin detemir, 60 Units, Subcutaneous, Daily  methenamine, 1 g, Oral, BID With Meals  micafungin (MYCAMINE) IV, 100 mg, Intravenous, Q24H  modafinil, 200 mg, Oral, Daily  pantoprazole, 40 mg, Oral, Q AM  sacubitril-valsartan, 1 tablet, Oral, Q12H  sodium chloride, 10 mL, Intravenous, Q12H  sodium hypochlorite, , Topical, Q12H         ----------------------------------------------------------------------------------------------------------------------    Vital Signs:  Temp:  [97.5 °F (36.4 °C)-97.8 °F (36.6 °C)] 97.5 °F (36.4 °C)  Heart Rate:  [68-84] 84  Resp:  [16-18] 18  BP: ()/(56-71) 97/60  Mean Arterial Pressure (Non-Invasive) for the past 24 hrs (Last 3 readings):   Noninvasive MAP (mmHg)   06/09/21 1424 78     SpO2 Percentage    06/08/21 2331 06/09/21 1900 06/10/21 1100   SpO2: 97% 96% 98%     SpO2:  [96 %-98 %] 98 %  on  Flow (L/min):  [3] 3;   Device (Oxygen Therapy): nasal cannula    Body mass index is 36.82 kg/m².  Wt Readings from Last 3 Encounters:   06/04/21 120 kg (264 lb)   02/15/21 126 kg (277 lb 12.5 oz)   06/02/20 (!) 150 kg (330 lb)        Intake/Output Summary (Last 24 hours) at 6/10/2021 1208  Last data filed at 6/10/2021 0853  Gross per 24 hour   Intake 2942.82 ml   Output 3750 ml   Net -807.18 ml     Diet Regular; Thin; Consistent Carbohydrate  ----------------------------------------------------------------------------------------------------------------------      Physical Exam:    Constitutional: Morbidly obese  male is resting comfortably in bed in no apparent distress.  Paraplegic.  HENT:  Head: Normocephalic and atraumatic.  Mouth:  Moist mucous membranes.    Eyes:  Conjunctivae and EOM are normal.  No scleral icterus.  Neck:  Neck supple.  No JVD present.    Cardiovascular:  Normal rate, regular rhythm and normal heart sounds  with no murmur. No edema.  Pulmonary/Chest:  No respiratory distress, no wheezes, no crackles, with normal breath sounds and good air movement.  Abdominal:  Soft.  Bowel sounds are normal. No tenderness or distention. Colostomy and ileostomy.  Morbidly obese.  Musculoskeletal: Left AKA, right leg atrophy.  Neurological:  Alert and oriented to person, place, and time.  No facial droop.  No slurred speech.  Paraplegic.  Skin:  Decubitus ulcers.  Psychiatric:  Normal mood and affect.  Behavior is normal.  ----------------------------------------------------------------------------------------------------------------------  Results from last 7 days   Lab Units 06/03/21  1728   TROPONIN T ng/mL <0.010             Results from last 7 days   Lab Units 06/10/21  0109 06/09/21  0055 06/08/21  1230 06/08/21  0055   CRP mg/dL 1.53* 2.12*  --  3.49*   WBC 10*3/mm3 7.43 10.73 7.37  --    HEMOGLOBIN g/dL 8.8* 8.0* 9.8*  --    HEMATOCRIT % 33.6* 30.2* 35.8*  --    MCV fL 81.2 79.3 78.0*  --    MCHC g/dL 26.2* 26.5* 27.4*  --    PLATELETS 10*3/mm3 588* 578* 596*  --      Results from last 7 days   Lab Units 06/10/21  0109 06/09/21  0055 06/08/21  1230 06/08/21  0055 06/07/21  0406 06/06/21  0227   SODIUM mmol/L 135* 131* 133*  --   --  136   POTASSIUM mmol/L 4.7 4.3 4.2  --   --  3.6   MAGNESIUM mg/dL  --   --   --  2.1 1.6 1.7   CHLORIDE mmol/L 97* 93* 93*  --   --  100   CO2 mmol/L 29.0 25.7 27.0  --   --  26.3   BUN mg/dL 26* 25* 16  --   --  9   CREATININE mg/dL 0.68* 0.99 0.63*  --   --  0.58*   EGFR IF NONAFRICN AM mL/min/1.73 127 83 139  --   --  >150   CALCIUM mg/dL 9.5 8.8 9.2  --   --  8.4*   GLUCOSE mg/dL 430* 453* 333*  --   --  234*   Estimated Creatinine Clearance: 184.6 mL/min (A) (by C-G formula based on SCr of 0.68 mg/dL (L)).  No results found for: AMMONIA    Glucose   Date/Time Value Ref Range Status   06/10/2021 1031 359 (H) 70 - 130 mg/dL Final   06/10/2021 0623 321 (H) 70 - 130 mg/dL Final   06/10/2021 0129  397 (H) 70 - 130 mg/dL Final   06/09/2021 2048 410 (C) 70 - 130 mg/dL Final   06/09/2021 1759 476 (C) 70 - 130 mg/dL Final   06/09/2021 1124 450 (C) 70 - 130 mg/dL Final   06/09/2021 0623 412 (C) 70 - 130 mg/dL Final   06/08/2021 2051 216 (H) 70 - 130 mg/dL Final     Lab Results   Component Value Date    HGBA1C 10.80 (H) 02/17/2021     Lab Results   Component Value Date    TSH 0.876 06/02/2021    FREET4 1.31 06/02/2021       Blood Culture   Date Value Ref Range Status   06/04/2021 No growth at 4 days  Preliminary   06/02/2021 Streptococcus agalactiae (Group B) (C)  Final   06/02/2021 Streptococcus agalactiae (Group B) (C)  Preliminary     Comment:     Refer to previous blood culture collected on 6/2/2021 at 2157 for MICs.   06/02/2021 Candida glabrata (C)  Corrected     Comment:     Infectious disease consultation is highly recommended to rule out distant foci of infection.    Fluconazole susceptibility to follow.  Appended report. These results have been appended to a previously final verified report.     Urine Culture   Date Value Ref Range Status   06/02/2021 >100,000 CFU/mL Serratia marcescens (A)  Final     Comment:     Please call the lab if pip/devorah is requested for Serratia spp. Will have to be set up by disk diffusion.       No results found for: WOUNDCX  No results found for: STOOLCX  No results found for: RESPCX  Pain Management Panel       Pain Management Panel Latest Ref Rng & Units 6/2/2021 8/19/2018    AMPHETAMINES SCREEN, URINE Negative Negative Negative    BARBITURATES SCREEN Negative Negative Negative    BENZODIAZEPINE SCREEN, URINE Negative Negative Negative    BUPRENORPHINEUR Negative Negative Negative    COCAINE SCREEN, URINE Negative Negative Negative    METHADONE SCREEN, URINE Negative Negative Negative              ----------------------------------------------------------------------------------------------------------------------  Imaging Results (Last 24 Hours)       ** No results found for  the last 24 hours. **            ----------------------------------------------------------------------------------------------------------------------    Assessment/Plan       Assessment/Plan     ASSESSMENT:    1.  Septic shock with lactic acid greater than 4 on admission  2.  Osteomyelitis  3.  UTI  4.  Bacteremia    PLAN:    Patient resting comfortably in bed this morning. No issues or complaints. Afebrile, no diarrhea. WBC normal. CRP improving at 1.53. Repeat blood cultures from 6/8/2021 show no growth thus far.    VISHAL on 6/8/2021 revealed no echocardiographic evidence of any endocardial vegetations or aortic root dilatation.    BC ID on 6/2/2021 finalized as Candida glabrata.  Another BC ID on 6/2/2021 finalized as group B strep.  2 out of 2 blood cultures on 6/2/2021 finalized as group B strep and 1 out of the 2 blood cultures also finalized with yeast.  1 blood culture on 6/4/2021 is so far showing no growth.  Urine culture on 6/2/2021 finalized as greater than 100,000 colonies of Serratia marcescens.  COVID-19 and flu A/B PCR on 6/3/2021 was negative.  MRI of the pelvis without contrast on 6/4/2021 showed bilateral ischial tuberosity region decubitus ulcers with surrounding soft tissue inflammation/infection but no loculated abscess.  Abnormal signal changes of the inferior pubic rami bilaterally consistent with osteomyelitis.  Secondary myositis of the obturator and abductor muscle groups.  Chronic bilateral hip findings.  No change from previous.  Enlarged probable reactive bilateral inguinal lymph nodes.  CT abdomen pelvis with contrast on 6/3/2021 showed correlation with detailed surgical history is recommended.  Large bilateral ulcers in the ischial regions are unchanged from prior.  No drainable fluid collection is seen.  There is an ileal conduit at least draining the left kidney if not both.  There is mild left hydronephrosis today.  Distended urinary bladder.  The prostate gland may be surgically  absent.  There does appear to be a cystocele with the bladder extending below the pelvic floor.  Descending colostomy.  No evidence of acute colitis, and no small bowel obstruction.  Cholecystectomy.       Cefepime and micafungin are appropriate coverage and patient will require a prolonged course of IV antibiotic therapy x6 weeks. Recommend close surgical follow-up for possible closure.    Code Status:   Code Status and Medical Interventions:   Ordered at: 06/02/21 2763     Code Status:    CPR     Medical Interventions (Level of Support Prior to Arrest):    Full       GUANACO Flores  06/10/21  12:08 EDT

## 2021-06-10 NOTE — PLAN OF CARE
Goal Outcome Evaluation:           Progress: no change  Outcome Summary: Patient had PICC placed today for long term abx usage when d/c'd. BG continues to be elevated. Wound care completed per orders. No complaints at this time. Will continue to monitor.

## 2021-06-10 NOTE — PHARMACY PATIENT ASSISTANCE
Pharmacy checked on the price of Entresto for the patient. It is covered on their insurance with a co-pay of $0.     Thank you,   Zunilda Reed, PharmD 06/10/21 18:29 EDT

## 2021-06-10 NOTE — PLAN OF CARE
Progress: improving  Outcome Summary: pt rested in bed this shift. BS continues to be elevated. Wound care completed per MAR. No complaints at this time. Will continue to monitor.

## 2021-06-10 NOTE — CONSULTS
"Assessment:  Diagnosis: LTAB    Allergies   Allergen Reactions   • Keflex [Cephalexin] Rash     Patient states \"red man syndrome\"\  Patient has tolerated ceftriaxone in the past   • Heparin Hives       Order Date/Time: 06/09/21  Indications: Long Term Antibiotics   LABS:  Lab Results   Component Value Date    INR 1.12 (H) 09/10/2018    PROTIME 14.6 09/10/2018     Lab Results   Component Value Date    PTT 29.8 09/10/2018     Lab Results   Component Value Date    WBC 7.43 06/10/2021    HGB 8.8 (L) 06/10/2021    HCT 33.6 (L) 06/10/2021    MCV 81.2 06/10/2021     (H) 06/10/2021     Lab Results   Component Value Date    BUN 26 (H) 06/10/2021     Lab Results   Component Value Date    CREATININE 0.68 (L) 06/10/2021     Lab Results   Component Value Date    EGFRIFNONA 127 06/10/2021     Labs Reviewed: WNL    Contraindications for PICC/Midline:  No Contraindication      Recommendations:  Peripherally inserted central catheter    Procedure Time Out:  Time out Time: 1030  Correct Patient Identity: Yes  Correct Surgical Side and Site Are Marked: Yes  Agreement on Procedure to be done: Yes  Antibiotic Given: N/A  RN: stella marcelino RN  RN: beverly esquivel RN  Other: n/a      Stella Marcelino RN    "
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
## 2021-06-11 LAB
ANION GAP SERPL CALCULATED.3IONS-SCNC: 7.3 MMOL/L (ref 5–15)
BUN SERPL-MCNC: 27 MG/DL (ref 6–20)
BUN/CREAT SERPL: 36.5 (ref 7–25)
CALCIUM SPEC-SCNC: 9.6 MG/DL (ref 8.6–10.5)
CHLORIDE SERPL-SCNC: 95 MMOL/L (ref 98–107)
CO2 SERPL-SCNC: 26.7 MMOL/L (ref 22–29)
CREAT SERPL-MCNC: 0.74 MG/DL (ref 0.76–1.27)
CRP SERPL-MCNC: 1.35 MG/DL (ref 0–0.5)
DEPRECATED RDW RBC AUTO: 45.2 FL (ref 37–54)
ERYTHROCYTE [DISTWIDTH] IN BLOOD BY AUTOMATED COUNT: 15.8 % (ref 12.3–15.4)
GFR SERPL CREATININE-BSD FRML MDRD: 115 ML/MIN/1.73
GLUCOSE BLDC GLUCOMTR-MCNC: 236 MG/DL (ref 70–130)
GLUCOSE BLDC GLUCOMTR-MCNC: 394 MG/DL (ref 70–130)
GLUCOSE BLDC GLUCOMTR-MCNC: 399 MG/DL (ref 70–130)
GLUCOSE BLDC GLUCOMTR-MCNC: 454 MG/DL (ref 70–130)
GLUCOSE SERPL-MCNC: 502 MG/DL (ref 65–99)
HCT VFR BLD AUTO: 31.4 % (ref 37.5–51)
HGB BLD-MCNC: 8.6 G/DL (ref 13–17.7)
MCH RBC QN AUTO: 21.8 PG (ref 26.6–33)
MCHC RBC AUTO-ENTMCNC: 27.4 G/DL (ref 31.5–35.7)
MCV RBC AUTO: 79.5 FL (ref 79–97)
PLATELET # BLD AUTO: 498 10*3/MM3 (ref 140–450)
PMV BLD AUTO: 9.4 FL (ref 6–12)
POTASSIUM SERPL-SCNC: 4.8 MMOL/L (ref 3.5–5.2)
RBC # BLD AUTO: 3.95 10*6/MM3 (ref 4.14–5.8)
SODIUM SERPL-SCNC: 129 MMOL/L (ref 136–145)
WBC # BLD AUTO: 7.92 10*3/MM3 (ref 3.4–10.8)

## 2021-06-11 PROCEDURE — 25010000002 CEFEPIME PER 500 MG: Performed by: PHYSICIAN ASSISTANT

## 2021-06-11 PROCEDURE — 82962 GLUCOSE BLOOD TEST: CPT

## 2021-06-11 PROCEDURE — 86140 C-REACTIVE PROTEIN: CPT | Performed by: PHYSICIAN ASSISTANT

## 2021-06-11 PROCEDURE — 25010000002 MICAFUNGIN SODIUM 100 MG RECONSTITUTED SOLUTION 1 EACH VIAL: Performed by: PHYSICIAN ASSISTANT

## 2021-06-11 PROCEDURE — 85027 COMPLETE CBC AUTOMATED: CPT | Performed by: INTERNAL MEDICINE

## 2021-06-11 PROCEDURE — 80048 BASIC METABOLIC PNL TOTAL CA: CPT | Performed by: INTERNAL MEDICINE

## 2021-06-11 PROCEDURE — 99232 SBSQ HOSP IP/OBS MODERATE 35: CPT | Performed by: INTERNAL MEDICINE

## 2021-06-11 PROCEDURE — 63710000001 INSULIN ASPART PER 5 UNITS: Performed by: INTERNAL MEDICINE

## 2021-06-11 PROCEDURE — 63710000001 INSULIN DETEMIR PER 5 UNITS: Performed by: INTERNAL MEDICINE

## 2021-06-11 RX ADMIN — APIXABAN 5 MG: 5 TABLET, FILM COATED ORAL at 11:53

## 2021-06-11 RX ADMIN — INSULIN DETEMIR 32 UNITS: 100 INJECTION, SOLUTION SUBCUTANEOUS at 20:30

## 2021-06-11 RX ADMIN — INSULIN ASPART 5 UNITS: 100 INJECTION, SOLUTION INTRAVENOUS; SUBCUTANEOUS at 18:03

## 2021-06-11 RX ADMIN — INSULIN ASPART 20 UNITS: 100 INJECTION, SOLUTION INTRAVENOUS; SUBCUTANEOUS at 11:53

## 2021-06-11 RX ADMIN — GABAPENTIN 800 MG: 400 CAPSULE ORAL at 20:31

## 2021-06-11 RX ADMIN — INSULIN ASPART 20 UNITS: 100 INJECTION, SOLUTION INTRAVENOUS; SUBCUTANEOUS at 09:12

## 2021-06-11 RX ADMIN — SACUBITRIL AND VALSARTAN 1 TABLET: 24; 26 TABLET, FILM COATED ORAL at 20:31

## 2021-06-11 RX ADMIN — BACLOFEN 30 MG: 10 TABLET ORAL at 09:13

## 2021-06-11 RX ADMIN — INSULIN DETEMIR 32 UNITS: 100 INJECTION, SOLUTION SUBCUTANEOUS at 10:09

## 2021-06-11 RX ADMIN — BACLOFEN 30 MG: 10 TABLET ORAL at 11:53

## 2021-06-11 RX ADMIN — DULOXETINE HYDROCHLORIDE 60 MG: 60 CAPSULE, DELAYED RELEASE ORAL at 09:13

## 2021-06-11 RX ADMIN — DULOXETINE HYDROCHLORIDE 60 MG: 60 CAPSULE, DELAYED RELEASE ORAL at 20:32

## 2021-06-11 RX ADMIN — SODIUM HYPOCHLORITE: 1.25 SOLUTION TOPICAL at 09:17

## 2021-06-11 RX ADMIN — CEFEPIME HYDROCHLORIDE 2 G: 1 INJECTION, POWDER, FOR SOLUTION INTRAMUSCULAR; INTRAVENOUS at 05:07

## 2021-06-11 RX ADMIN — SACUBITRIL AND VALSARTAN 1 TABLET: 24; 26 TABLET, FILM COATED ORAL at 09:13

## 2021-06-11 RX ADMIN — CARVEDILOL 6.25 MG: 6.25 TABLET, FILM COATED ORAL at 17:09

## 2021-06-11 RX ADMIN — METHENAMINE HIPPURATE 1 G: 1 TABLET ORAL at 17:09

## 2021-06-11 RX ADMIN — BACLOFEN 30 MG: 10 TABLET ORAL at 20:31

## 2021-06-11 RX ADMIN — APIXABAN 5 MG: 5 TABLET, FILM COATED ORAL at 20:32

## 2021-06-11 RX ADMIN — BACLOFEN 30 MG: 10 TABLET ORAL at 17:09

## 2021-06-11 RX ADMIN — SODIUM HYPOCHLORITE: 1.25 SOLUTION TOPICAL at 22:29

## 2021-06-11 RX ADMIN — INSULIN ASPART 12 UNITS: 100 INJECTION, SOLUTION INTRAVENOUS; SUBCUTANEOUS at 12:29

## 2021-06-11 RX ADMIN — MICAFUNGIN SODIUM 100 MG: 100 INJECTION, POWDER, LYOPHILIZED, FOR SOLUTION INTRAVENOUS at 09:15

## 2021-06-11 RX ADMIN — CEFEPIME HYDROCHLORIDE 2 G: 1 INJECTION, POWDER, FOR SOLUTION INTRAMUSCULAR; INTRAVENOUS at 12:29

## 2021-06-11 RX ADMIN — MODAFINIL 200 MG: 100 TABLET ORAL at 09:12

## 2021-06-11 RX ADMIN — SODIUM CHLORIDE, PRESERVATIVE FREE 10 ML: 5 INJECTION INTRAVENOUS at 09:15

## 2021-06-11 RX ADMIN — CHOLECALCIFEROL TAB 10 MCG (400 UNIT) 2000 UNITS: 10 TAB at 09:12

## 2021-06-11 RX ADMIN — INSULIN ASPART 14 UNITS: 100 INJECTION, SOLUTION INTRAVENOUS; SUBCUTANEOUS at 06:46

## 2021-06-11 RX ADMIN — INSULIN ASPART 20 UNITS: 100 INJECTION, SOLUTION INTRAVENOUS; SUBCUTANEOUS at 17:09

## 2021-06-11 RX ADMIN — ARIPIPRAZOLE 10 MG: 10 TABLET ORAL at 20:32

## 2021-06-11 RX ADMIN — METHENAMINE HIPPURATE 1 G: 1 TABLET ORAL at 09:16

## 2021-06-11 RX ADMIN — ATORVASTATIN CALCIUM 10 MG: 10 TABLET, FILM COATED ORAL at 20:31

## 2021-06-11 RX ADMIN — CARVEDILOL 6.25 MG: 6.25 TABLET, FILM COATED ORAL at 09:13

## 2021-06-11 RX ADMIN — PANTOPRAZOLE SODIUM 40 MG: 40 TABLET, DELAYED RELEASE ORAL at 05:07

## 2021-06-11 RX ADMIN — GABAPENTIN 800 MG: 400 CAPSULE ORAL at 09:12

## 2021-06-11 RX ADMIN — CEFEPIME HYDROCHLORIDE 2 G: 1 INJECTION, POWDER, FOR SOLUTION INTRAMUSCULAR; INTRAVENOUS at 20:31

## 2021-06-11 RX ADMIN — GABAPENTIN 800 MG: 400 CAPSULE ORAL at 17:08

## 2021-06-11 NOTE — PROGRESS NOTES
Antimicrobial Length of Therapy:    Day 9 of 42 cefepime  Day 7 of 42 micafungin    Thank you.  Sneha Harrell, Pharm.D.  6/11/2021  14:38 EDT

## 2021-06-11 NOTE — CASE MANAGEMENT/SOCIAL WORK
Discharge Planning Assessment   Fabricio     Patient Name: Ken Krueger  MRN: [id number]  Today's Date: 6/11/2021    Admit Date: 6/2/2021    Discharge Needs Assessment    No documentation.       Discharge Plan     Row Name 06/11/21 1020       Plan    Plan Pt was admitted on 06/02/21.Pt plans on returning home with his brother at discharge once medically stable. Pt's brother and mother to assist with IV antibiotics at discharge. [facility] utilizes [facility]. [facility] [phone] to be contacted at discharge. [facility] will need a new order at discharge to resume care. [facility] per To  [phone] to be contacted at discharge. SS to follow.    Row Name 06/11/21 1024        Evelyn Deras

## 2021-06-11 NOTE — PLAN OF CARE
Goal Outcome Evaluation:  Plan of Care Reviewed With: patient        Progress: improving  Outcome Summary: Pt has no complaints, no s/s of distress noted, wounds look better, they are clean, no drainage. BS getting better controlled. Con't IV ABX/wound care. Will con't to monitor.

## 2021-06-11 NOTE — PROGRESS NOTES
PROGRESS NOTE         Patient Identification:  Name:  Ken Krueger  Age:  45 y.o.  Sex:  male  :  1975  MRN:  [id number]  Visit Number:  [id number]  Primary Care Provider:  Franchesca Hodges APRN         LOS: 9 days       ----------------------------------------------------------------------------------------------------------------------  Subjective       Chief Complaints:    Chest Pain and Fever        Interval History:      Patient resting in bed.  No issues or complaints.  Afebrile, no diarrhea.  WBC normal.  CRP improving at 1.35.    Review of Systems:    Constitutional: no fever, chills and night sweats. No appetite change or unexpected weight change. No fatigue.  Eyes: no eye drainage, itching or redness.  HEENT: no mouth sores, dysphagia or nose bleed.  Respiratory: no for shortness of breath, cough or production of sputum.  Cardiovascular: no chest pain, no palpitations, no orthopnea.  Gastrointestinal: no nausea, vomiting or diarrhea. No abdominal pain, hematemesis or rectal bleeding.  Genitourinary: no dysuria or polyuria.  Hematologic/lymphatic: no lymph node abnormalities, no easy bruising or easy bleeding.  Musculoskeletal: no muscle or joint pain.  Left AKA.  Skin: No rash and no itching.  Sacral wounds.  Neurological: no loss of consciousness, no seizure, no headache.  Paraplegia.  Behavioral/Psych: no depression or suicidal ideation.  Endocrine: no hot flashes.  Immunologic: negative.  ----------------------------------------------------------------------------------------------------------------------      Objective       Current [facility] Meds:  ARIPiprazole, 10 mg, Oral, Nightly  atorvastatin, 10 mg, Oral, Nightly  baclofen, 30 mg, Oral, 4x Daily With Meals & Nightly  carvedilol, 6.25 mg, Oral, BID With Meals  cefepime, 2 g, Intravenous, Q8H  cholecalciferol, 2,000 Units, Oral, Daily  DULoxetine, 60 mg, Oral, BID  enoxaparin, 1 mg/kg, Subcutaneous, Q12H  gabapentin, 800 mg, Oral,  TID  insulin aspart, 0-14 Units, Subcutaneous, TID PC  insulin aspart, 20 Units, Subcutaneous, TID With Meals  insulin detemir, 32 Units, Subcutaneous, Q12H  methenamine, 1 g, Oral, BID With Meals  micafungin (MYCAMINE) IV, 100 mg, Intravenous, Q24H  modafinil, 200 mg, Oral, Daily  pantoprazole, 40 mg, Oral, Q AM  sacubitril-valsartan, 1 tablet, Oral, Q12H  sodium chloride, 10 mL, Intravenous, Q12H  sodium hypochlorite, , Topical, Q12H         ----------------------------------------------------------------------------------------------------------------------    Vital Signs:  Temp:  [97.4 °F (36.3 °C)-97.8 °F (36.6 °C)] 97.4 °F (36.3 °C)  Heart Rate:  [78-84] 82  Resp:  [18-20] 18  BP: ()/(52-69) 111/69  No data found.  SpO2 Percentage    06/09/21 1900 06/10/21 1100 06/11/21 0640   SpO2: 96% 98% 97%     SpO2:  [97 %-98 %] 97 %  on  Flow (L/min):  [3] 3;   Device (Oxygen Therapy): nasal cannula    Body mass index is 36.82 kg/m².  Wt Readings from Last 3 Encounters:   06/04/21 120 kg (264 lb)   02/15/21 126 kg (277 lb 12.5 oz)   06/02/20 (!) 150 kg (330 lb)        Intake/Output Summary (Last 24 hours) at 6/11/2021 0958  Last data filed at 6/11/2021 0920  Gross per 24 hour   Intake 1822.76 ml   Output 3925 ml   Net -2102.24 ml     Diet Regular; Thin; Consistent Carbohydrate  ----------------------------------------------------------------------------------------------------------------------      Physical Exam:    Constitutional: Morbidly obese  male is resting comfortably in bed in no apparent distress.  Paraplegic.  HENT:  Head: Normocephalic and atraumatic.  Mouth:  Moist mucous membranes.    Eyes:  Conjunctivae and EOM are normal.  No scleral icterus.  Neck:  Neck supple.  No JVD present.    Cardiovascular:  Normal rate, regular rhythm and normal heart sounds with no murmur. No edema.  Pulmonary/Chest:  No respiratory distress, no wheezes, no crackles, with normal breath sounds and good air  movement.  Abdominal:  Soft.  Bowel sounds are normal. No tenderness or distention. Colostomy and ileostomy.  Morbidly obese.  Musculoskeletal: Left AKA, right leg atrophy.  Neurological:  Alert and oriented to person, place, and time.  No facial droop.  No slurred speech.  Paraplegic.  Skin:  Decubitus ulcers.  Psychiatric:  Normal mood and affect.  Behavior is normal.  ----------------------------------------------------------------------------------------------------------------------              Results from last 7 days   Lab Units 06/11/21  0526 06/10/21  0109 06/09/21  0055   CRP mg/dL 1.35* 1.53* 2.12*   WBC 10*3/mm3 7.92 7.43 10.73   HEMOGLOBIN g/dL 8.6* 8.8* 8.0*   HEMATOCRIT % 31.4* 33.6* 30.2*   MCV fL 79.5 81.2 79.3   MCHC g/dL 27.4* 26.2* 26.5*   PLATELETS 10*3/mm3 498* 588* 578*     Results from last 7 days   Lab Units 06/11/21  0526 06/10/21  0109 06/09/21  0055 06/08/21  0055 06/07/21  0406 06/06/21  0227   SODIUM mmol/L 129* 135* 131*  --   --  136   POTASSIUM mmol/L 4.8 4.7 4.3  --   --  3.6   MAGNESIUM mg/dL  --   --   --  2.1 1.6 1.7   CHLORIDE mmol/L 95* 97* 93*  --   --  100   CO2 mmol/L 26.7 29.0 25.7  --   --  26.3   BUN mg/dL 27* 26* 25*  --   --  9   CREATININE mg/dL 0.74* 0.68* 0.99  --   --  0.58*   EGFR IF NONAFRICN AM mL/min/1.73 115 127 83  --   --  >150   CALCIUM mg/dL 9.6 9.5 8.8  --   --  8.4*   GLUCOSE mg/dL 502* 430* 453*  --   --  234*   Estimated Creatinine Clearance: 169.7 mL/min (A) (by C-G formula based on SCr of 0.74 mg/dL (L)).  No results found for: AMMONIA    Glucose   Date/Time Value Ref Range Status   06/11/2021 0655 454 (C) 70 - 130 mg/dL Final   06/10/2021 2002 313 (H) 70 - 130 mg/dL Final   06/10/2021 1642 243 (H) 70 - 130 mg/dL Final   06/10/2021 1031 359 (H) 70 - 130 mg/dL Final   06/10/2021 0623 321 (H) 70 - 130 mg/dL Final   06/10/2021 0129 397 (H) 70 - 130 mg/dL Final   06/09/2021 2048 410 (C) 70 - 130 mg/dL Final   06/09/2021 1759 476 (C) 70 - 130 mg/dL Final      Lab Results   Component Value Date    HGBA1C 10.80 (H) 02/17/2021     Lab Results   Component Value Date    TSH 0.876 06/02/2021    FREET4 1.31 06/02/2021       Blood Culture   Date Value Ref Range Status   06/04/2021 No growth at 4 days  Preliminary   06/02/2021 Streptococcus agalactiae (Group B) (C)  Final   06/02/2021 Streptococcus agalactiae (Group B) (C)  Preliminary     Comment:     Refer to previous blood culture collected on 6/2/2021 at 2157 for MICs.   06/02/2021 Candida glabrata (C)  Corrected     Comment:     Infectious disease consultation is highly recommended to rule out distant foci of infection.    Fluconazole susceptibility to follow.  Appended report. These results have been appended to a previously final verified report.     Urine Culture   Date Value Ref Range Status   06/02/2021 >100,000 CFU/mL Serratia marcescens (A)  Final     Comment:     Please call the lab if pip/devorah is requested for Serratia spp. Will have to be set up by disk diffusion.       No results found for: WOUNDCX  No results found for: STOOLCX  No results found for: RESPCX  Pain Management Panel       Pain Management Panel Latest Ref Rng & Units 6/2/2021 8/19/2018    AMPHETAMINES SCREEN, URINE Negative Negative Negative    BARBITURATES SCREEN Negative Negative Negative    BENZODIAZEPINE SCREEN, URINE Negative Negative Negative    BUPRENORPHINEUR Negative Negative Negative    COCAINE SCREEN, URINE Negative Negative Negative    METHADONE SCREEN, URINE Negative Negative Negative              ----------------------------------------------------------------------------------------------------------------------  Imaging Results (Last 24 Hours)       ** No results found for the last 24 hours. **            ----------------------------------------------------------------------------------------------------------------------    Assessment/Plan       Assessment/Plan     ASSESSMENT:    1.  Septic shock with lactic acid greater than 4 on  admission  2.  Osteomyelitis  3.  UTI  4.  Bacteremia    PLAN:    Patient resting in bed.  No issues or complaints.  Afebrile, no diarrhea.  WBC normal.  CRP improving at 1.35.    Repeat blood cultures from 6/8/2021 show no growth thus far.    VISHAL on 6/8/2021 revealed no echocardiographic evidence of any endocardial vegetations or aortic root dilatation.    BC ID on 6/2/2021 finalized as Candida glabrata.  Another BC ID on 6/2/2021 finalized as group B strep.  2 out of 2 blood cultures on 6/2/2021 finalized as group B strep and 1 out of the 2 blood cultures also finalized with yeast.  1 blood culture on 6/4/2021 is so far showing no growth.  Urine culture on 6/2/2021 finalized as greater than 100,000 colonies of Serratia marcescens.  COVID-19 and flu A/B PCR on 6/3/2021 was negative.  MRI of the pelvis without contrast on 6/4/2021 showed bilateral ischial tuberosity region decubitus ulcers with surrounding soft tissue inflammation/infection but no loculated abscess.  Abnormal signal changes of the inferior pubic rami bilaterally consistent with osteomyelitis.  Secondary myositis of the obturator and abductor muscle groups.  Chronic bilateral hip findings.  No change from previous.  Enlarged probable reactive bilateral inguinal lymph nodes.  CT abdomen pelvis with contrast on 6/3/2021 showed correlation with detailed surgical history is recommended.  Large bilateral ulcers in the ischial regions are unchanged from prior.  No drainable fluid collection is seen.  There is an ileal conduit at least draining the left kidney if not both.  There is mild left hydronephrosis today.  Distended urinary bladder.  The prostate gland may be surgically absent.  There does appear to be a cystocele with the bladder extending below the pelvic floor.  Descending colostomy.  No evidence of acute colitis, and no small bowel obstruction.  Cholecystectomy.       Cefepime and micafungin are appropriate coverage and patient will require a  prolonged course of IV antibiotic therapy x6 weeks through 7/16/2021. Recommend close surgical follow-up for possible closure.    Code Status:   Code Status and Medical Interventions:   Ordered at: 06/02/21 0690     Code Status:    CPR     Medical Interventions (Level of Support Prior to Arrest):    Full       GUANACO Flores  06/11/21  09:58 EDT
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
## 2021-06-11 NOTE — PLAN OF CARE
Goal Outcome Evaluation:              Outcome Summary: Patient has rested in bed this shift, no complaints or request at this time, wound care completed per order, blood glucose continues to be elevated, will continue to monitor.

## 2021-06-12 LAB
ANION GAP SERPL CALCULATED.3IONS-SCNC: 6.5 MMOL/L (ref 5–15)
BUN SERPL-MCNC: 22 MG/DL (ref 6–20)
BUN/CREAT SERPL: 39.3 (ref 7–25)
CALCIUM SPEC-SCNC: 9.8 MG/DL (ref 8.6–10.5)
CHLORIDE SERPL-SCNC: 100 MMOL/L (ref 98–107)
CO2 SERPL-SCNC: 27.5 MMOL/L (ref 22–29)
CREAT SERPL-MCNC: 0.56 MG/DL (ref 0.76–1.27)
CRP SERPL-MCNC: 1.48 MG/DL (ref 0–0.5)
DEPRECATED RDW RBC AUTO: 45.1 FL (ref 37–54)
ERYTHROCYTE [DISTWIDTH] IN BLOOD BY AUTOMATED COUNT: 15.9 % (ref 12.3–15.4)
GFR SERPL CREATININE-BSD FRML MDRD: >150 ML/MIN/1.73
GLUCOSE BLDC GLUCOMTR-MCNC: 261 MG/DL (ref 70–130)
GLUCOSE BLDC GLUCOMTR-MCNC: 312 MG/DL (ref 70–130)
GLUCOSE BLDC GLUCOMTR-MCNC: 348 MG/DL (ref 70–130)
GLUCOSE BLDC GLUCOMTR-MCNC: 363 MG/DL (ref 70–130)
GLUCOSE SERPL-MCNC: 277 MG/DL (ref 65–99)
HCT VFR BLD AUTO: 34.1 % (ref 37.5–51)
HGB BLD-MCNC: 9.2 G/DL (ref 13–17.7)
MCH RBC QN AUTO: 21.7 PG (ref 26.6–33)
MCHC RBC AUTO-ENTMCNC: 27 G/DL (ref 31.5–35.7)
MCV RBC AUTO: 80.4 FL (ref 79–97)
PLATELET # BLD AUTO: 486 10*3/MM3 (ref 140–450)
PMV BLD AUTO: 9.3 FL (ref 6–12)
POTASSIUM SERPL-SCNC: 4.4 MMOL/L (ref 3.5–5.2)
RBC # BLD AUTO: 4.24 10*6/MM3 (ref 4.14–5.8)
SODIUM SERPL-SCNC: 134 MMOL/L (ref 136–145)
WBC # BLD AUTO: 8.09 10*3/MM3 (ref 3.4–10.8)

## 2021-06-12 PROCEDURE — 86140 C-REACTIVE PROTEIN: CPT | Performed by: PHYSICIAN ASSISTANT

## 2021-06-12 PROCEDURE — 63710000001 INSULIN DETEMIR PER 5 UNITS: Performed by: INTERNAL MEDICINE

## 2021-06-12 PROCEDURE — 63710000001 INSULIN ASPART PER 5 UNITS: Performed by: INTERNAL MEDICINE

## 2021-06-12 PROCEDURE — 82962 GLUCOSE BLOOD TEST: CPT

## 2021-06-12 PROCEDURE — 85027 COMPLETE CBC AUTOMATED: CPT | Performed by: INTERNAL MEDICINE

## 2021-06-12 PROCEDURE — 99232 SBSQ HOSP IP/OBS MODERATE 35: CPT | Performed by: INTERNAL MEDICINE

## 2021-06-12 PROCEDURE — 25010000002 CEFEPIME PER 500 MG: Performed by: PHYSICIAN ASSISTANT

## 2021-06-12 PROCEDURE — 80048 BASIC METABOLIC PNL TOTAL CA: CPT | Performed by: INTERNAL MEDICINE

## 2021-06-12 PROCEDURE — 25010000002 MICAFUNGIN SODIUM 100 MG RECONSTITUTED SOLUTION 1 EACH VIAL: Performed by: PHYSICIAN ASSISTANT

## 2021-06-12 RX ADMIN — CHOLECALCIFEROL TAB 10 MCG (400 UNIT) 2000 UNITS: 10 TAB at 09:17

## 2021-06-12 RX ADMIN — BACLOFEN 30 MG: 10 TABLET ORAL at 21:38

## 2021-06-12 RX ADMIN — GABAPENTIN 800 MG: 400 CAPSULE ORAL at 17:42

## 2021-06-12 RX ADMIN — APIXABAN 5 MG: 5 TABLET, FILM COATED ORAL at 21:38

## 2021-06-12 RX ADMIN — MICAFUNGIN SODIUM 100 MG: 100 INJECTION, POWDER, LYOPHILIZED, FOR SOLUTION INTRAVENOUS at 09:17

## 2021-06-12 RX ADMIN — METHENAMINE HIPPURATE 1 G: 1 TABLET ORAL at 09:16

## 2021-06-12 RX ADMIN — INSULIN ASPART 20 UNITS: 100 INJECTION, SOLUTION INTRAVENOUS; SUBCUTANEOUS at 09:16

## 2021-06-12 RX ADMIN — INSULIN ASPART 8 UNITS: 100 INJECTION, SOLUTION INTRAVENOUS; SUBCUTANEOUS at 09:18

## 2021-06-12 RX ADMIN — ATORVASTATIN CALCIUM 10 MG: 10 TABLET, FILM COATED ORAL at 21:38

## 2021-06-12 RX ADMIN — INSULIN DETEMIR 32 UNITS: 100 INJECTION, SOLUTION SUBCUTANEOUS at 21:34

## 2021-06-12 RX ADMIN — SACUBITRIL AND VALSARTAN 1 TABLET: 24; 26 TABLET, FILM COATED ORAL at 09:17

## 2021-06-12 RX ADMIN — CARVEDILOL 6.25 MG: 6.25 TABLET, FILM COATED ORAL at 09:17

## 2021-06-12 RX ADMIN — INSULIN ASPART 10 UNITS: 100 INJECTION, SOLUTION INTRAVENOUS; SUBCUTANEOUS at 13:03

## 2021-06-12 RX ADMIN — SACUBITRIL AND VALSARTAN 1 TABLET: 24; 26 TABLET, FILM COATED ORAL at 21:38

## 2021-06-12 RX ADMIN — PANTOPRAZOLE SODIUM 40 MG: 40 TABLET, DELAYED RELEASE ORAL at 04:37

## 2021-06-12 RX ADMIN — DULOXETINE HYDROCHLORIDE 60 MG: 60 CAPSULE, DELAYED RELEASE ORAL at 21:38

## 2021-06-12 RX ADMIN — CEFEPIME HYDROCHLORIDE 2 G: 1 INJECTION, POWDER, FOR SOLUTION INTRAMUSCULAR; INTRAVENOUS at 04:35

## 2021-06-12 RX ADMIN — CEFEPIME HYDROCHLORIDE 2 G: 1 INJECTION, POWDER, FOR SOLUTION INTRAMUSCULAR; INTRAVENOUS at 21:40

## 2021-06-12 RX ADMIN — CARVEDILOL 6.25 MG: 6.25 TABLET, FILM COATED ORAL at 17:42

## 2021-06-12 RX ADMIN — INSULIN ASPART 20 UNITS: 100 INJECTION, SOLUTION INTRAVENOUS; SUBCUTANEOUS at 13:03

## 2021-06-12 RX ADMIN — SODIUM HYPOCHLORITE: 1.25 SOLUTION TOPICAL at 21:47

## 2021-06-12 RX ADMIN — DULOXETINE HYDROCHLORIDE 60 MG: 60 CAPSULE, DELAYED RELEASE ORAL at 09:17

## 2021-06-12 RX ADMIN — BACLOFEN 30 MG: 10 TABLET ORAL at 13:03

## 2021-06-12 RX ADMIN — INSULIN ASPART 20 UNITS: 100 INJECTION, SOLUTION INTRAVENOUS; SUBCUTANEOUS at 17:42

## 2021-06-12 RX ADMIN — BACLOFEN 30 MG: 10 TABLET ORAL at 17:42

## 2021-06-12 RX ADMIN — APIXABAN 5 MG: 5 TABLET, FILM COATED ORAL at 09:17

## 2021-06-12 RX ADMIN — INSULIN DETEMIR 32 UNITS: 100 INJECTION, SOLUTION SUBCUTANEOUS at 09:25

## 2021-06-12 RX ADMIN — MODAFINIL 200 MG: 100 TABLET ORAL at 09:25

## 2021-06-12 RX ADMIN — SODIUM HYPOCHLORITE: 1.25 SOLUTION TOPICAL at 09:18

## 2021-06-12 RX ADMIN — GABAPENTIN 800 MG: 400 CAPSULE ORAL at 21:38

## 2021-06-12 RX ADMIN — INSULIN ASPART 10 UNITS: 100 INJECTION, SOLUTION INTRAVENOUS; SUBCUTANEOUS at 17:43

## 2021-06-12 RX ADMIN — CEFEPIME HYDROCHLORIDE 2 G: 1 INJECTION, POWDER, FOR SOLUTION INTRAMUSCULAR; INTRAVENOUS at 13:03

## 2021-06-12 RX ADMIN — BACLOFEN 30 MG: 10 TABLET ORAL at 09:17

## 2021-06-12 RX ADMIN — METHENAMINE HIPPURATE 1 G: 1 TABLET ORAL at 17:42

## 2021-06-12 RX ADMIN — ARIPIPRAZOLE 10 MG: 10 TABLET ORAL at 21:38

## 2021-06-12 RX ADMIN — GABAPENTIN 800 MG: 400 CAPSULE ORAL at 09:17

## 2021-06-12 RX ADMIN — SODIUM CHLORIDE, PRESERVATIVE FREE 10 ML: 5 INJECTION INTRAVENOUS at 09:18

## 2021-06-12 NOTE — PLAN OF CARE
Goal Outcome Evaluation:           Progress: improving     Patient is stable, has had no complaints this shift. Wound care completed.

## 2021-06-12 NOTE — PLAN OF CARE
Goal Outcome Evaluation:           Progress: improving  Outcome Summary: Patient has slept most of the day, patient voices no compaints, wound care done per orders, blood glucose has been more controlled today.
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
## 2021-06-12 NOTE — PHARMACY PATIENT ASSISTANCE
Pharmacy evaluated the cost of Eliquis for patient. Mr. Krueger last filled Eliquis at [facility] for no copay. No issues identified at this time.    Thank you,    Brionna Rossi, PharmD  06/12/21  17:02 EDT

## 2021-06-13 LAB
ANION GAP SERPL CALCULATED.3IONS-SCNC: 8 MMOL/L (ref 5–15)
BACTERIA SPEC AEROBE CULT: NORMAL
BACTERIA SPEC AEROBE CULT: NORMAL
BUN SERPL-MCNC: 27 MG/DL (ref 6–20)
BUN/CREAT SERPL: 42.2 (ref 7–25)
CALCIUM SPEC-SCNC: 9.8 MG/DL (ref 8.6–10.5)
CHLORIDE SERPL-SCNC: 100 MMOL/L (ref 98–107)
CO2 SERPL-SCNC: 28 MMOL/L (ref 22–29)
CREAT SERPL-MCNC: 0.64 MG/DL (ref 0.76–1.27)
CRP SERPL-MCNC: 1.7 MG/DL (ref 0–0.5)
DEPRECATED RDW RBC AUTO: 46 FL (ref 37–54)
ERYTHROCYTE [DISTWIDTH] IN BLOOD BY AUTOMATED COUNT: 16 % (ref 12.3–15.4)
GFR SERPL CREATININE-BSD FRML MDRD: 136 ML/MIN/1.73
GLUCOSE BLDC GLUCOMTR-MCNC: 125 MG/DL (ref 70–130)
GLUCOSE BLDC GLUCOMTR-MCNC: 201 MG/DL (ref 70–130)
GLUCOSE BLDC GLUCOMTR-MCNC: 349 MG/DL (ref 70–130)
GLUCOSE BLDC GLUCOMTR-MCNC: 442 MG/DL (ref 70–130)
GLUCOSE SERPL-MCNC: 295 MG/DL (ref 65–99)
HCT VFR BLD AUTO: 32.7 % (ref 37.5–51)
HGB BLD-MCNC: 8.8 G/DL (ref 13–17.7)
MCH RBC QN AUTO: 21.7 PG (ref 26.6–33)
MCHC RBC AUTO-ENTMCNC: 26.9 G/DL (ref 31.5–35.7)
MCV RBC AUTO: 80.5 FL (ref 79–97)
PLATELET # BLD AUTO: 456 10*3/MM3 (ref 140–450)
PMV BLD AUTO: 9.7 FL (ref 6–12)
POTASSIUM SERPL-SCNC: 4.6 MMOL/L (ref 3.5–5.2)
RBC # BLD AUTO: 4.06 10*6/MM3 (ref 4.14–5.8)
SODIUM SERPL-SCNC: 136 MMOL/L (ref 136–145)
WBC # BLD AUTO: 7.38 10*3/MM3 (ref 3.4–10.8)

## 2021-06-13 PROCEDURE — 63710000001 INSULIN DETEMIR PER 5 UNITS: Performed by: INTERNAL MEDICINE

## 2021-06-13 PROCEDURE — 99232 SBSQ HOSP IP/OBS MODERATE 35: CPT | Performed by: INTERNAL MEDICINE

## 2021-06-13 PROCEDURE — 25010000002 MICAFUNGIN SODIUM 100 MG RECONSTITUTED SOLUTION 1 EACH VIAL: Performed by: PHYSICIAN ASSISTANT

## 2021-06-13 PROCEDURE — 63710000001 INSULIN ASPART PER 5 UNITS: Performed by: INTERNAL MEDICINE

## 2021-06-13 PROCEDURE — 80048 BASIC METABOLIC PNL TOTAL CA: CPT | Performed by: INTERNAL MEDICINE

## 2021-06-13 PROCEDURE — 86140 C-REACTIVE PROTEIN: CPT | Performed by: PHYSICIAN ASSISTANT

## 2021-06-13 PROCEDURE — 94799 UNLISTED PULMONARY SVC/PX: CPT

## 2021-06-13 PROCEDURE — 85027 COMPLETE CBC AUTOMATED: CPT | Performed by: INTERNAL MEDICINE

## 2021-06-13 PROCEDURE — 82962 GLUCOSE BLOOD TEST: CPT

## 2021-06-13 PROCEDURE — 25010000002 CEFEPIME PER 500 MG: Performed by: PHYSICIAN ASSISTANT

## 2021-06-13 RX ADMIN — ARIPIPRAZOLE 10 MG: 10 TABLET ORAL at 20:21

## 2021-06-13 RX ADMIN — CEFEPIME HYDROCHLORIDE 2 G: 1 INJECTION, POWDER, FOR SOLUTION INTRAMUSCULAR; INTRAVENOUS at 12:21

## 2021-06-13 RX ADMIN — INSULIN DETEMIR 32 UNITS: 100 INJECTION, SOLUTION SUBCUTANEOUS at 09:13

## 2021-06-13 RX ADMIN — MICAFUNGIN SODIUM 100 MG: 100 INJECTION, POWDER, LYOPHILIZED, FOR SOLUTION INTRAVENOUS at 09:13

## 2021-06-13 RX ADMIN — SACUBITRIL AND VALSARTAN 1 TABLET: 24; 26 TABLET, FILM COATED ORAL at 20:21

## 2021-06-13 RX ADMIN — ATORVASTATIN CALCIUM 10 MG: 10 TABLET, FILM COATED ORAL at 20:21

## 2021-06-13 RX ADMIN — DULOXETINE HYDROCHLORIDE 60 MG: 60 CAPSULE, DELAYED RELEASE ORAL at 20:20

## 2021-06-13 RX ADMIN — CEFEPIME HYDROCHLORIDE 2 G: 1 INJECTION, POWDER, FOR SOLUTION INTRAMUSCULAR; INTRAVENOUS at 04:50

## 2021-06-13 RX ADMIN — GABAPENTIN 800 MG: 400 CAPSULE ORAL at 16:50

## 2021-06-13 RX ADMIN — SODIUM CHLORIDE, PRESERVATIVE FREE 10 ML: 5 INJECTION INTRAVENOUS at 09:17

## 2021-06-13 RX ADMIN — INSULIN ASPART 20 UNITS: 100 INJECTION, SOLUTION INTRAVENOUS; SUBCUTANEOUS at 09:16

## 2021-06-13 RX ADMIN — INSULIN ASPART 20 UNITS: 100 INJECTION, SOLUTION INTRAVENOUS; SUBCUTANEOUS at 16:50

## 2021-06-13 RX ADMIN — CEFEPIME HYDROCHLORIDE 2 G: 1 INJECTION, POWDER, FOR SOLUTION INTRAMUSCULAR; INTRAVENOUS at 20:20

## 2021-06-13 RX ADMIN — CHOLECALCIFEROL TAB 10 MCG (400 UNIT) 2000 UNITS: 10 TAB at 09:14

## 2021-06-13 RX ADMIN — APIXABAN 5 MG: 5 TABLET, FILM COATED ORAL at 09:16

## 2021-06-13 RX ADMIN — GABAPENTIN 800 MG: 400 CAPSULE ORAL at 09:16

## 2021-06-13 RX ADMIN — SACUBITRIL AND VALSARTAN 1 TABLET: 24; 26 TABLET, FILM COATED ORAL at 09:16

## 2021-06-13 RX ADMIN — APIXABAN 5 MG: 5 TABLET, FILM COATED ORAL at 20:20

## 2021-06-13 RX ADMIN — INSULIN ASPART 10 UNITS: 100 INJECTION, SOLUTION INTRAVENOUS; SUBCUTANEOUS at 11:53

## 2021-06-13 RX ADMIN — METHENAMINE HIPPURATE 1 G: 1 TABLET ORAL at 16:51

## 2021-06-13 RX ADMIN — PANTOPRAZOLE SODIUM 40 MG: 40 TABLET, DELAYED RELEASE ORAL at 04:52

## 2021-06-13 RX ADMIN — METHENAMINE HIPPURATE 1 G: 1 TABLET ORAL at 09:17

## 2021-06-13 RX ADMIN — INSULIN DETEMIR 34 UNITS: 100 INJECTION, SOLUTION SUBCUTANEOUS at 21:06

## 2021-06-13 RX ADMIN — DULOXETINE HYDROCHLORIDE 60 MG: 60 CAPSULE, DELAYED RELEASE ORAL at 09:16

## 2021-06-13 RX ADMIN — CARVEDILOL 6.25 MG: 6.25 TABLET, FILM COATED ORAL at 16:51

## 2021-06-13 RX ADMIN — SODIUM HYPOCHLORITE: 1.25 SOLUTION TOPICAL at 09:17

## 2021-06-13 RX ADMIN — INSULIN ASPART 10 UNITS: 100 INJECTION, SOLUTION INTRAVENOUS; SUBCUTANEOUS at 09:16

## 2021-06-13 RX ADMIN — BACLOFEN 30 MG: 10 TABLET ORAL at 11:53

## 2021-06-13 RX ADMIN — BACLOFEN 30 MG: 10 TABLET ORAL at 16:51

## 2021-06-13 RX ADMIN — INSULIN ASPART 5 UNITS: 100 INJECTION, SOLUTION INTRAVENOUS; SUBCUTANEOUS at 16:50

## 2021-06-13 RX ADMIN — MODAFINIL 200 MG: 100 TABLET ORAL at 09:15

## 2021-06-13 RX ADMIN — SODIUM HYPOCHLORITE: 1.25 SOLUTION TOPICAL at 20:23

## 2021-06-13 RX ADMIN — GABAPENTIN 800 MG: 400 CAPSULE ORAL at 20:21

## 2021-06-13 RX ADMIN — BACLOFEN 30 MG: 10 TABLET ORAL at 09:15

## 2021-06-13 RX ADMIN — BACLOFEN 30 MG: 10 TABLET ORAL at 20:20

## 2021-06-13 RX ADMIN — INSULIN ASPART 20 UNITS: 100 INJECTION, SOLUTION INTRAVENOUS; SUBCUTANEOUS at 11:52

## 2021-06-13 RX ADMIN — CARVEDILOL 6.25 MG: 6.25 TABLET, FILM COATED ORAL at 09:15

## 2021-06-13 NOTE — PLAN OF CARE
Goal Outcome Evaluation:           Progress: improving  Outcome Summary: Patient has done well today. Wound care completed. No complaints voiced.

## 2021-06-13 NOTE — PROGRESS NOTES
PROGRESS NOTE         Patient Identification:  Name:  Ken Krueger  Age:  45 y.o.  Sex:  male  :  1975  MRN:  [id number]  Visit Number:  [id number]  Primary Care Provider:  Franchesca Hodges APRN         LOS: 11 days       ----------------------------------------------------------------------------------------------------------------------  Subjective       Chief Complaints:    Chest Pain and Fever        Interval History:      Patient resting comfortably in bed this morning.  No issues or complaints.  Afebrile, no diarrhea.  WBC normal.  CRP stable at 1.70.    Review of Systems:    Constitutional: no fever, chills and night sweats. No appetite change or unexpected weight change. No fatigue.  Eyes: no eye drainage, itching or redness.  HEENT: no mouth sores, dysphagia or nose bleed.  Respiratory: no for shortness of breath, cough or production of sputum.  Cardiovascular: no chest pain, no palpitations, no orthopnea.  Gastrointestinal: no nausea, vomiting or diarrhea. No abdominal pain, hematemesis or rectal bleeding.  Genitourinary: no dysuria or polyuria.  Hematologic/lymphatic: no lymph node abnormalities, no easy bruising or easy bleeding.  Musculoskeletal: no muscle or joint pain.  Left AKA.  Skin: No rash and no itching.  Sacral wounds.  Neurological: no loss of consciousness, no seizure, no headache.  Paraplegia.  Behavioral/Psych: no depression or suicidal ideation.  Endocrine: no hot flashes.  Immunologic: negative.  ----------------------------------------------------------------------------------------------------------------------      Objective       Current [facility] Meds:  apixaban, 5 mg, Oral, Q12H  ARIPiprazole, 10 mg, Oral, Nightly  atorvastatin, 10 mg, Oral, Nightly  baclofen, 30 mg, Oral, 4x Daily With Meals & Nightly  carvedilol, 6.25 mg, Oral, BID With Meals  cefepime, 2 g, Intravenous, Q8H  cholecalciferol, 2,000 Units, Oral, Daily  DULoxetine, 60 mg, Oral, BID  gabapentin, 800  mg, Oral, TID  insulin aspart, 0-14 Units, Subcutaneous, TID PC  insulin aspart, 20 Units, Subcutaneous, TID With Meals  insulin detemir, 32 Units, Subcutaneous, Q12H  methenamine, 1 g, Oral, BID With Meals  micafungin (MYCAMINE) IV, 100 mg, Intravenous, Q24H  modafinil, 200 mg, Oral, Daily  pantoprazole, 40 mg, Oral, Q AM  sacubitril-valsartan, 1 tablet, Oral, Q12H  sodium chloride, 10 mL, Intravenous, Q12H  sodium hypochlorite, , Topical, Q12H         ----------------------------------------------------------------------------------------------------------------------    Vital Signs:  Temp:  [97.5 °F (36.4 °C)-98.9 °F (37.2 °C)] 97.5 °F (36.4 °C)  Heart Rate:  [75-96] 87  Resp:  [18-20] 18  BP: (102-142)/(60-83) 142/83  No data found.  SpO2 Percentage    06/12/21 1900 06/13/21 0500 06/13/21 0621   SpO2: 99% 98% 98%     SpO2:  [96 %-99 %] 98 %  on  Flow (L/min):  [2] 2;   Device (Oxygen Therapy): nasal cannula    Body mass index is 36.82 kg/m².  Wt Readings from Last 3 Encounters:   06/04/21 120 kg (264 lb)   02/15/21 126 kg (277 lb 12.5 oz)   06/02/20 (!) 150 kg (330 lb)        Intake/Output Summary (Last 24 hours) at 6/13/2021 0845  Last data filed at 6/13/2021 0500  Gross per 24 hour   Intake 2049.52 ml   Output 3325 ml   Net -1275.48 ml     Diet Regular; Thin; Consistent Carbohydrate  ----------------------------------------------------------------------------------------------------------------------      Physical Exam:    Constitutional: Morbidly obese  male is resting comfortably in bed in no apparent distress.  Paraplegic.  HENT:  Head: Normocephalic and atraumatic.  Mouth:  Moist mucous membranes.    Eyes:  Conjunctivae and EOM are normal.  No scleral icterus.  Neck:  Neck supple.  No JVD present.    Cardiovascular:  Normal rate, regular rhythm and normal heart sounds with no murmur. No edema.  Pulmonary/Chest:  No respiratory distress, no wheezes, no crackles, with normal breath sounds and good  air movement.  Abdominal:  Soft.  Bowel sounds are normal. No tenderness or distention. Colostomy and ileostomy.  Morbidly obese.  Musculoskeletal: Left AKA, right leg atrophy.  Neurological:  Alert and oriented to person, place, and time.  No facial droop.  No slurred speech.  Paraplegic.  Skin:  Decubitus ulcers.  Psychiatric:  Normal mood and affect.  Behavior is normal.  ----------------------------------------------------------------------------------------------------------------------              Results from last 7 days   Lab Units 06/13/21  0255 06/12/21  0507 06/12/21 0454 06/11/21  0526   CRP mg/dL 1.70*  --  1.48* 1.35*   WBC 10*3/mm3 7.38 8.09  --  7.92   HEMOGLOBIN g/dL 8.8* 9.2*  --  8.6*   HEMATOCRIT % 32.7* 34.1*  --  31.4*   MCV fL 80.5 80.4  --  79.5   MCHC g/dL 26.9* 27.0*  --  27.4*   PLATELETS 10*3/mm3 456* 486*  --  498*     Results from last 7 days   Lab Units 06/13/21  0255 06/12/21 0454 06/11/21  0526 06/08/21  0055 06/07/21  0406   SODIUM mmol/L 136 134* 129*  --   --    POTASSIUM mmol/L 4.6 4.4 4.8  --   --    MAGNESIUM mg/dL  --   --   --  2.1 1.6   CHLORIDE mmol/L 100 100 95*  --   --    CO2 mmol/L 28.0 27.5 26.7  --   --    BUN mg/dL 27* 22* 27*  --   --    CREATININE mg/dL 0.64* 0.56* 0.74*  --   --    EGFR IF NONAFRICN AM mL/min/1.73 136 >150 115  --   --    CALCIUM mg/dL 9.8 9.8 9.6  --   --    GLUCOSE mg/dL 295* 277* 502*  --   --    Estimated Creatinine Clearance: 196.2 mL/min (A) (by C-G formula based on SCr of 0.64 mg/dL (L)).  No results found for: AMMONIA    Glucose   Date/Time Value Ref Range Status   06/13/2021 0625 349 (H) 70 - 130 mg/dL Final   06/12/2021 1958 363 (H) 70 - 130 mg/dL Final   06/12/2021 1628 312 (H) 70 - 130 mg/dL Final   06/12/2021 1041 348 (H) 70 - 130 mg/dL Final   06/12/2021 0620 261 (H) 70 - 130 mg/dL Final   06/11/2021 2029 399 (H) 70 - 130 mg/dL Final   06/11/2021 1641 236 (H) 70 - 130 mg/dL Final   06/11/2021 1120 394 (H) 70 - 130 mg/dL Final      Lab Results   Component Value Date    HGBA1C 10.80 (H) 02/17/2021     Lab Results   Component Value Date    TSH 0.876 06/02/2021    FREET4 1.31 06/02/2021       Blood Culture   Date Value Ref Range Status   06/04/2021 No growth at 4 days  Preliminary   06/02/2021 Streptococcus agalactiae (Group B) (C)  Final   06/02/2021 Streptococcus agalactiae (Group B) (C)  Preliminary     Comment:     Refer to previous blood culture collected on 6/2/2021 at 2157 for MICs.   06/02/2021 Candida glabrata (C)  Corrected     Comment:     Infectious disease consultation is highly recommended to rule out distant foci of infection.    Fluconazole susceptibility to follow.  Appended report. These results have been appended to a previously final verified report.     Urine Culture   Date Value Ref Range Status   06/02/2021 >100,000 CFU/mL Serratia marcescens (A)  Final     Comment:     Please call the lab if pip/devorah is requested for Serratia spp. Will have to be set up by disk diffusion.       No results found for: WOUNDCX  No results found for: STOOLCX  No results found for: RESPCX  Pain Management Panel       Pain Management Panel Latest Ref Rng & Units 6/2/2021 8/19/2018    AMPHETAMINES SCREEN, URINE Negative Negative Negative    BARBITURATES SCREEN Negative Negative Negative    BENZODIAZEPINE SCREEN, URINE Negative Negative Negative    BUPRENORPHINEUR Negative Negative Negative    COCAINE SCREEN, URINE Negative Negative Negative    METHADONE SCREEN, URINE Negative Negative Negative              ----------------------------------------------------------------------------------------------------------------------  Imaging Results (Last 24 Hours)       ** No results found for the last 24 hours. **            ----------------------------------------------------------------------------------------------------------------------    Assessment/Plan       Assessment/Plan     ASSESSMENT:    1.  Septic shock with lactic acid greater than 4 on  admission  2.  Osteomyelitis  3.  UTI  4.  Bacteremia    PLAN:    Patient resting comfortably in bed this morning.  No issues or complaints.  Afebrile, no diarrhea.  WBC normal.  CRP stable at 1.70.    Repeat blood cultures from 6/8/2021 show no growth thus far.    VISHAL on 6/8/2021 revealed no echocardiographic evidence of any endocardial vegetations or aortic root dilatation.    BC ID on 6/2/2021 finalized as Candida glabrata.  Another BC ID on 6/2/2021 finalized as group B strep.  2 out of 2 blood cultures on 6/2/2021 finalized as group B strep and 1 out of the 2 blood cultures also finalized with yeast.  1 blood culture on 6/4/2021 is so far showing no growth.  Urine culture on 6/2/2021 finalized as greater than 100,000 colonies of Serratia marcescens.  COVID-19 and flu A/B PCR on 6/3/2021 was negative.  MRI of the pelvis without contrast on 6/4/2021 showed bilateral ischial tuberosity region decubitus ulcers with surrounding soft tissue inflammation/infection but no loculated abscess.  Abnormal signal changes of the inferior pubic rami bilaterally consistent with osteomyelitis.  Secondary myositis of the obturator and abductor muscle groups.  Chronic bilateral hip findings.  No change from previous.  Enlarged probable reactive bilateral inguinal lymph nodes.  CT abdomen pelvis with contrast on 6/3/2021 showed correlation with detailed surgical history is recommended.  Large bilateral ulcers in the ischial regions are unchanged from prior.  No drainable fluid collection is seen.  There is an ileal conduit at least draining the left kidney if not both.  There is mild left hydronephrosis today.  Distended urinary bladder.  The prostate gland may be surgically absent.  There does appear to be a cystocele with the bladder extending below the pelvic floor.  Descending colostomy.  No evidence of acute colitis, and no small bowel obstruction.  Cholecystectomy.       Cefepime and micafungin are appropriate coverage and  patient will require a prolonged course of IV antibiotic therapy x6 weeks through 7/16/2021. Recommend close surgical follow-up for possible closure.    Code Status:   Code Status and Medical Interventions:   Ordered at: 06/02/21 6839     Code Status:    CPR     Medical Interventions (Level of Support Prior to Arrest):    Full       GUANACO Flores  06/13/21  08:45 EDT

## 2021-06-13 NOTE — PLAN OF CARE
Goal Outcome Evaluation:           Progress: improving     Patient has been stable this shift, his HS glucose was 363 but he had just eaten a meatloaf dinner his brother brought in. Wound care done, tolerated well.
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
## 2021-06-14 ENCOUNTER — APPOINTMENT (OUTPATIENT)
Dept: GENERAL RADIOLOGY | Facility: HOSPITAL | Age: 46
End: 2021-06-14

## 2021-06-14 VITALS
DIASTOLIC BLOOD PRESSURE: 64 MMHG | HEART RATE: 89 BPM | BODY MASS INDEX: 36.96 KG/M2 | HEIGHT: 71 IN | TEMPERATURE: 97.8 F | WEIGHT: 264 LBS | OXYGEN SATURATION: 95 % | SYSTOLIC BLOOD PRESSURE: 107 MMHG | RESPIRATION RATE: 18 BRPM

## 2021-06-14 LAB
ANION GAP SERPL CALCULATED.3IONS-SCNC: 9.2 MMOL/L (ref 5–15)
BACTERIA SPEC AEROBE CULT: ABNORMAL
BACTERIA SPEC AEROBE CULT: ABNORMAL
BUN SERPL-MCNC: 26 MG/DL (ref 6–20)
BUN/CREAT SERPL: 41.9 (ref 7–25)
CALCIUM SPEC-SCNC: 9.6 MG/DL (ref 8.6–10.5)
CHLORIDE SERPL-SCNC: 101 MMOL/L (ref 98–107)
CO2 SERPL-SCNC: 25.8 MMOL/L (ref 22–29)
CREAT SERPL-MCNC: 0.62 MG/DL (ref 0.76–1.27)
CRP SERPL-MCNC: 1.47 MG/DL (ref 0–0.5)
DEPRECATED RDW RBC AUTO: 47 FL (ref 37–54)
ERYTHROCYTE [DISTWIDTH] IN BLOOD BY AUTOMATED COUNT: 16.4 % (ref 12.3–15.4)
GFR SERPL CREATININE-BSD FRML MDRD: 142 ML/MIN/1.73
GLUCOSE BLDC GLUCOMTR-MCNC: 223 MG/DL (ref 70–130)
GLUCOSE BLDC GLUCOMTR-MCNC: 260 MG/DL (ref 70–130)
GLUCOSE BLDC GLUCOMTR-MCNC: 356 MG/DL (ref 70–130)
GLUCOSE SERPL-MCNC: 249 MG/DL (ref 65–99)
GRAM STN SPEC: ABNORMAL
GRAM STN SPEC: ABNORMAL
HCT VFR BLD AUTO: 32.9 % (ref 37.5–51)
HGB BLD-MCNC: 8.9 G/DL (ref 13–17.7)
ISOLATED FROM: ABNORMAL
ISOLATED FROM: ABNORMAL
MCH RBC QN AUTO: 21.7 PG (ref 26.6–33)
MCHC RBC AUTO-ENTMCNC: 27.1 G/DL (ref 31.5–35.7)
MCV RBC AUTO: 80.2 FL (ref 79–97)
PLATELET # BLD AUTO: 446 10*3/MM3 (ref 140–450)
PMV BLD AUTO: 9.5 FL (ref 6–12)
POTASSIUM SERPL-SCNC: 4.3 MMOL/L (ref 3.5–5.2)
RBC # BLD AUTO: 4.1 10*6/MM3 (ref 4.14–5.8)
SODIUM SERPL-SCNC: 136 MMOL/L (ref 136–145)
WBC # BLD AUTO: 7.66 10*3/MM3 (ref 3.4–10.8)

## 2021-06-14 PROCEDURE — 71045 X-RAY EXAM CHEST 1 VIEW: CPT

## 2021-06-14 PROCEDURE — 94799 UNLISTED PULMONARY SVC/PX: CPT

## 2021-06-14 PROCEDURE — 86140 C-REACTIVE PROTEIN: CPT | Performed by: PHYSICIAN ASSISTANT

## 2021-06-14 PROCEDURE — 63710000001 INSULIN DETEMIR PER 5 UNITS: Performed by: INTERNAL MEDICINE

## 2021-06-14 PROCEDURE — 25010000002 MICAFUNGIN SODIUM 100 MG RECONSTITUTED SOLUTION 1 EACH VIAL: Performed by: PHYSICIAN ASSISTANT

## 2021-06-14 PROCEDURE — 82962 GLUCOSE BLOOD TEST: CPT

## 2021-06-14 PROCEDURE — 80048 BASIC METABOLIC PNL TOTAL CA: CPT | Performed by: INTERNAL MEDICINE

## 2021-06-14 PROCEDURE — 25010000002 CEFEPIME PER 500 MG: Performed by: PHYSICIAN ASSISTANT

## 2021-06-14 PROCEDURE — 85027 COMPLETE CBC AUTOMATED: CPT | Performed by: INTERNAL MEDICINE

## 2021-06-14 PROCEDURE — 63710000001 INSULIN ASPART PER 5 UNITS: Performed by: INTERNAL MEDICINE

## 2021-06-14 PROCEDURE — 25010000002 ALTEPLASE 2 MG RECONSTITUTED SOLUTION 1 EACH VIAL: Performed by: INTERNAL MEDICINE

## 2021-06-14 PROCEDURE — 71045 X-RAY EXAM CHEST 1 VIEW: CPT | Performed by: RADIOLOGY

## 2021-06-14 PROCEDURE — 99239 HOSP IP/OBS DSCHRG MGMT >30: CPT | Performed by: INTERNAL MEDICINE

## 2021-06-14 RX ORDER — ACETAMINOPHEN 325 MG/1
650 TABLET ORAL EVERY 6 HOURS PRN
Qty: 90 TABLET | Refills: 0 | Status: SHIPPED | OUTPATIENT
Start: 2021-06-14 | End: 2021-07-14

## 2021-06-14 RX ORDER — CARVEDILOL 6.25 MG/1
6.25 TABLET ORAL 2 TIMES DAILY WITH MEALS
Qty: 60 TABLET | Refills: 0 | Status: ON HOLD | OUTPATIENT
Start: 2021-06-14 | End: 2022-03-21

## 2021-06-14 RX ADMIN — SODIUM HYPOCHLORITE: 1.25 SOLUTION TOPICAL at 08:52

## 2021-06-14 RX ADMIN — PANTOPRAZOLE SODIUM 40 MG: 40 TABLET, DELAYED RELEASE ORAL at 05:06

## 2021-06-14 RX ADMIN — CARVEDILOL 6.25 MG: 6.25 TABLET, FILM COATED ORAL at 17:08

## 2021-06-14 RX ADMIN — BACLOFEN 30 MG: 10 TABLET ORAL at 12:11

## 2021-06-14 RX ADMIN — INSULIN DETEMIR 34 UNITS: 100 INJECTION, SOLUTION SUBCUTANEOUS at 08:50

## 2021-06-14 RX ADMIN — INSULIN ASPART 12 UNITS: 100 INJECTION, SOLUTION INTRAVENOUS; SUBCUTANEOUS at 12:12

## 2021-06-14 RX ADMIN — MODAFINIL 200 MG: 100 TABLET ORAL at 08:51

## 2021-06-14 RX ADMIN — INSULIN ASPART 20 UNITS: 100 INJECTION, SOLUTION INTRAVENOUS; SUBCUTANEOUS at 12:11

## 2021-06-14 RX ADMIN — METHENAMINE HIPPURATE 1 G: 1 TABLET ORAL at 08:52

## 2021-06-14 RX ADMIN — MICAFUNGIN SODIUM 100 MG: 100 INJECTION, POWDER, LYOPHILIZED, FOR SOLUTION INTRAVENOUS at 08:52

## 2021-06-14 RX ADMIN — CHOLECALCIFEROL TAB 10 MCG (400 UNIT) 2000 UNITS: 10 TAB at 08:52

## 2021-06-14 RX ADMIN — GABAPENTIN 800 MG: 400 CAPSULE ORAL at 17:08

## 2021-06-14 RX ADMIN — SODIUM CHLORIDE, PRESERVATIVE FREE 10 ML: 5 INJECTION INTRAVENOUS at 08:53

## 2021-06-14 RX ADMIN — BACLOFEN 30 MG: 10 TABLET ORAL at 08:52

## 2021-06-14 RX ADMIN — CARVEDILOL 6.25 MG: 6.25 TABLET, FILM COATED ORAL at 08:52

## 2021-06-14 RX ADMIN — CEFEPIME HYDROCHLORIDE 2 G: 1 INJECTION, POWDER, FOR SOLUTION INTRAMUSCULAR; INTRAVENOUS at 05:06

## 2021-06-14 RX ADMIN — SACUBITRIL AND VALSARTAN 1 TABLET: 24; 26 TABLET, FILM COATED ORAL at 08:52

## 2021-06-14 RX ADMIN — INSULIN ASPART 5 UNITS: 100 INJECTION, SOLUTION INTRAVENOUS; SUBCUTANEOUS at 17:08

## 2021-06-14 RX ADMIN — INSULIN ASPART 8 UNITS: 100 INJECTION, SOLUTION INTRAVENOUS; SUBCUTANEOUS at 08:51

## 2021-06-14 RX ADMIN — GABAPENTIN 800 MG: 400 CAPSULE ORAL at 08:50

## 2021-06-14 RX ADMIN — CEFEPIME HYDROCHLORIDE 2 G: 1 INJECTION, POWDER, FOR SOLUTION INTRAMUSCULAR; INTRAVENOUS at 12:11

## 2021-06-14 RX ADMIN — ALTEPLASE: 2.2 INJECTION, POWDER, LYOPHILIZED, FOR SOLUTION INTRAVENOUS at 15:00

## 2021-06-14 RX ADMIN — INSULIN ASPART 20 UNITS: 100 INJECTION, SOLUTION INTRAVENOUS; SUBCUTANEOUS at 17:09

## 2021-06-14 RX ADMIN — INSULIN ASPART 20 UNITS: 100 INJECTION, SOLUTION INTRAVENOUS; SUBCUTANEOUS at 08:53

## 2021-06-14 RX ADMIN — BACLOFEN 30 MG: 10 TABLET ORAL at 17:08

## 2021-06-14 RX ADMIN — METHENAMINE HIPPURATE 1 G: 1 TABLET ORAL at 17:09

## 2021-06-14 RX ADMIN — APIXABAN 5 MG: 5 TABLET, FILM COATED ORAL at 08:52

## 2021-06-14 RX ADMIN — DULOXETINE HYDROCHLORIDE 60 MG: 60 CAPSULE, DELAYED RELEASE ORAL at 08:52

## 2021-06-14 NOTE — CASE MANAGEMENT/SOCIAL WORK
Discharge Planning Assessment   [place]     Patient Name: Ken Krueger  MRN: [id number]  Today's Date: 6/14/2021    Admit Date: 6/2/2021      Discharge Plan     Row Name 06/14/21 1426       Plan    Final Note Pt is being discharged home today. Pt utilizes [facility] for home health services. SS notified [facility]- per Doris. SS faxed clinicals including AVS, discharge summary and IV antibiotic orders to [facility] RN to call report to [facility]-. SS notified [facility] per To and faxed final IV antibiotic orders. [facility] to deliver IV antibiotics and supplies to pt's home. No other needs identified.    Row Name 06/14/21 1400       Plan    Final Discharge Disposition Code  06 - home with home health care     Continued Care and Services - Admitted Since 6/2/2021     Home Medical Care Coordination complete    Service Provider Request Status Selected Services Address Phone Fax Patient Preferred    [facility]   [facility] [address] [place] [address] [phone] --              BRENT Burns

## 2021-06-14 NOTE — PROGRESS NOTES
PROGRESS NOTE         Patient Identification:  Name:  Ken Krueger  Age:  45 y.o.  Sex:  male  :  1975  MRN:  [id number]  Visit Number:  [id number]  Primary Care Provider:  Franchesca Hodges APRN         LOS: 12 days       ----------------------------------------------------------------------------------------------------------------------  Subjective       Chief Complaints:    Chest Pain and Fever        Interval History:      Patient resting in bed this morning.  No issues or complaints.  Afebrile, no diarrhea.  WBC normal.  CRP improving at 1.47.    Review of Systems:    Constitutional: no fever, chills and night sweats. No appetite change or unexpected weight change. No fatigue.  Eyes: no eye drainage, itching or redness.  HEENT: no mouth sores, dysphagia or nose bleed.  Respiratory: no for shortness of breath, cough or production of sputum.  Cardiovascular: no chest pain, no palpitations, no orthopnea.  Gastrointestinal: no nausea, vomiting or diarrhea. No abdominal pain, hematemesis or rectal bleeding.  Genitourinary: no dysuria or polyuria.  Hematologic/lymphatic: no lymph node abnormalities, no easy bruising or easy bleeding.  Musculoskeletal: no muscle or joint pain.  Left AKA.  Skin: No rash and no itching.  Sacral wounds.  Neurological: no loss of consciousness, no seizure, no headache.  Paraplegia.  Behavioral/Psych: no depression or suicidal ideation.  Endocrine: no hot flashes.  Immunologic: negative.  ----------------------------------------------------------------------------------------------------------------------      Objective       Current [facility] Meds:  apixaban, 5 mg, Oral, Q12H  ARIPiprazole, 10 mg, Oral, Nightly  atorvastatin, 10 mg, Oral, Nightly  baclofen, 30 mg, Oral, 4x Daily With Meals & Nightly  carvedilol, 6.25 mg, Oral, BID With Meals  cefepime, 2 g, Intravenous, Q8H  cholecalciferol, 2,000 Units, Oral, Daily  DULoxetine, 60 mg, Oral, BID  gabapentin, 800 mg, Oral,  TID  insulin aspart, 0-14 Units, Subcutaneous, TID PC  insulin aspart, 20 Units, Subcutaneous, TID With Meals  insulin detemir, 34 Units, Subcutaneous, Q12H  methenamine, 1 g, Oral, BID With Meals  micafungin (MYCAMINE) IV, 100 mg, Intravenous, Q24H  modafinil, 200 mg, Oral, Daily  pantoprazole, 40 mg, Oral, Q AM  sacubitril-valsartan, 1 tablet, Oral, Q12H  sodium chloride, 10 mL, Intravenous, Q12H  sodium hypochlorite, , Topical, Q12H         ----------------------------------------------------------------------------------------------------------------------    Vital Signs:  Temp:  [97.4 °F (36.3 °C)-98.6 °F (37 °C)] 97.4 °F (36.3 °C)  Heart Rate:  [74-92] 92  Resp:  [18] 18  BP: (106-129)/(53-78) 129/78  Mean Arterial Pressure (Non-Invasive) for the past 24 hrs (Last 3 readings):   Noninvasive MAP (mmHg)   06/14/21 1001 93   06/14/21 0616 88     SpO2 Percentage    06/13/21 0621 06/13/21 1416 06/13/21 2008   SpO2: 98% 99% 97%     SpO2:  [97 %-99 %] 97 %  on  Flow (L/min):  [2] 2;   Device (Oxygen Therapy): nasal cannula    Body mass index is 36.82 kg/m².  Wt Readings from Last 3 Encounters:   06/04/21 120 kg (264 lb)   02/15/21 126 kg (277 lb 12.5 oz)   06/02/20 (!) 150 kg (330 lb)        Intake/Output Summary (Last 24 hours) at 6/14/2021 1029  Last data filed at 6/14/2021 1001  Gross per 24 hour   Intake 2292 ml   Output 4050 ml   Net -1758 ml     Diet Regular; Thin; Consistent Carbohydrate  ----------------------------------------------------------------------------------------------------------------------      Physical Exam:    Constitutional: Morbidly obese  male is resting comfortably in bed in no apparent distress.  Paraplegic.  HENT:  Head: Normocephalic and atraumatic.  Mouth:  Moist mucous membranes.    Eyes:  Conjunctivae and EOM are normal.  No scleral icterus.  Neck:  Neck supple.  No JVD present.    Cardiovascular:  Normal rate, regular rhythm and normal heart sounds with no murmur. No  edema.  Pulmonary/Chest:  No respiratory distress, no wheezes, no crackles, with normal breath sounds and good air movement.  Abdominal:  Soft.  Bowel sounds are normal. No tenderness or distention. Colostomy and ileostomy.  Morbidly obese.  Musculoskeletal: Left AKA, right leg atrophy.  Neurological:  Alert and oriented to person, place, and time.  No facial droop.  No slurred speech.  Paraplegic.  Skin:  Decubitus ulcers.  Psychiatric:  Normal mood and affect.  Behavior is normal.  ----------------------------------------------------------------------------------------------------------------------              Results from last 7 days   Lab Units 06/14/21 0257 06/13/21 0255 06/12/21 0507 06/12/21 0454   CRP mg/dL 1.47* 1.70*  --  1.48*   WBC 10*3/mm3 7.66 7.38 8.09  --    HEMOGLOBIN g/dL 8.9* 8.8* 9.2*  --    HEMATOCRIT % 32.9* 32.7* 34.1*  --    MCV fL 80.2 80.5 80.4  --    MCHC g/dL 27.1* 26.9* 27.0*  --    PLATELETS 10*3/mm3 446 456* 486*  --      Results from last 7 days   Lab Units 06/14/21 0257 06/13/21 0255 06/12/21  0454 06/08/21  0055   SODIUM mmol/L 136 136 134*  --    POTASSIUM mmol/L 4.3 4.6 4.4  --    MAGNESIUM mg/dL  --   --   --  2.1   CHLORIDE mmol/L 101 100 100  --    CO2 mmol/L 25.8 28.0 27.5  --    BUN mg/dL 26* 27* 22*  --    CREATININE mg/dL 0.62* 0.64* 0.56*  --    EGFR IF NONAFRICN AM mL/min/1.73 142 136 >150  --    CALCIUM mg/dL 9.6 9.8 9.8  --    GLUCOSE mg/dL 249* 295* 277*  --    Estimated Creatinine Clearance: 202.5 mL/min (A) (by C-G formula based on SCr of 0.62 mg/dL (L)).  No results found for: AMMONIA    Glucose   Date/Time Value Ref Range Status   06/14/2021 0622 260 (H) 70 - 130 mg/dL Final   06/13/2021 2025 125 70 - 130 mg/dL Final   06/13/2021 1649 201 (H) 70 - 130 mg/dL Final   06/13/2021 1048 442 (C) 70 - 130 mg/dL Final   06/13/2021 0625 349 (H) 70 - 130 mg/dL Final   06/12/2021 1958 363 (H) 70 - 130 mg/dL Final   06/12/2021 1628 312 (H) 70 - 130 mg/dL Final    06/12/2021 1041 348 (H) 70 - 130 mg/dL Final     Lab Results   Component Value Date    HGBA1C 10.80 (H) 02/17/2021     Lab Results   Component Value Date    TSH 0.876 06/02/2021    FREET4 1.31 06/02/2021       Blood Culture   Date Value Ref Range Status   06/04/2021 No growth at 4 days  Preliminary   06/02/2021 Streptococcus agalactiae (Group B) (C)  Final   06/02/2021 Streptococcus agalactiae (Group B) (C)  Preliminary     Comment:     Refer to previous blood culture collected on 6/2/2021 at 2157 for MICs.   06/02/2021 Candida glabrata (C)  Corrected     Comment:     Infectious disease consultation is highly recommended to rule out distant foci of infection.    Fluconazole susceptibility to follow.  Appended report. These results have been appended to a previously final verified report.     Urine Culture   Date Value Ref Range Status   06/02/2021 >100,000 CFU/mL Serratia marcescens (A)  Final     Comment:     Please call the lab if pip/devorah is requested for Serratia spp. Will have to be set up by disk diffusion.       No results found for: WOUNDCX  No results found for: STOOLCX  No results found for: RESPCX  Pain Management Panel       Pain Management Panel Latest Ref Rng & Units 6/2/2021 8/19/2018    AMPHETAMINES SCREEN, URINE Negative Negative Negative    BARBITURATES SCREEN Negative Negative Negative    BENZODIAZEPINE SCREEN, URINE Negative Negative Negative    BUPRENORPHINEUR Negative Negative Negative    COCAINE SCREEN, URINE Negative Negative Negative    METHADONE SCREEN, URINE Negative Negative Negative              ----------------------------------------------------------------------------------------------------------------------  Imaging Results (Last 24 Hours)       ** No results found for the last 24 hours. **            ----------------------------------------------------------------------------------------------------------------------    Assessment/Plan       Assessment/Plan     ASSESSMENT:    1.   Septic shock with lactic acid greater than 4 on admission  2.  Osteomyelitis  3.  UTI  4.  Bacteremia    PLAN:    Patient resting in bed this morning.  No issues or complaints.  Afebrile, no diarrhea.  WBC normal.  CRP improving at 1.47.    Repeat blood cultures from 6/8/2021 show no growth thus far.    VISHAL on 6/8/2021 revealed no echocardiographic evidence of any endocardial vegetations or aortic root dilatation.    BC ID on 6/2/2021 finalized as Candida glabrata.  Another BC ID on 6/2/2021 finalized as group B strep.  2 out of 2 blood cultures on 6/2/2021 finalized as group B strep and 1 out of the 2 blood cultures also finalized with yeast.  1 blood culture on 6/4/2021 is so far showing no growth.  Urine culture on 6/2/2021 finalized as greater than 100,000 colonies of Serratia marcescens.  COVID-19 and flu A/B PCR on 6/3/2021 was negative.  MRI of the pelvis without contrast on 6/4/2021 showed bilateral ischial tuberosity region decubitus ulcers with surrounding soft tissue inflammation/infection but no loculated abscess.  Abnormal signal changes of the inferior pubic rami bilaterally consistent with osteomyelitis.  Secondary myositis of the obturator and abductor muscle groups.  Chronic bilateral hip findings.  No change from previous.  Enlarged probable reactive bilateral inguinal lymph nodes.  CT abdomen pelvis with contrast on 6/3/2021 showed correlation with detailed surgical history is recommended.  Large bilateral ulcers in the ischial regions are unchanged from prior.  No drainable fluid collection is seen.  There is an ileal conduit at least draining the left kidney if not both.  There is mild left hydronephrosis today.  Distended urinary bladder.  The prostate gland may be surgically absent.  There does appear to be a cystocele with the bladder extending below the pelvic floor.  Descending colostomy.  No evidence of acute colitis, and no small bowel obstruction.  Cholecystectomy.       Cefepime and  micafungin are appropriate coverage and patient will require a prolonged course of IV antibiotic therapy x6 weeks through 7/16/2021. Recommend close surgical follow-up for possible closure.    Code Status:   Code Status and Medical Interventions:   Ordered at: 06/02/21 2293     Code Status:    CPR     Medical Interventions (Level of Support Prior to Arrest):    Full       GUANACO Flores  06/14/21  10:29 EDT

## 2021-06-14 NOTE — DISCHARGE INSTRUCTIONS
Wound Locations coccyx    Cleanse Dakins Solution 1/4 Strength     Normal Saline    Intervention Thera Honey    Dressing: Silicone Border Dressing      Wound Locations right lower gluteal and left lower gluteal    Cleanse Dakins Solution 1/4 Strength    Intervention Fluffed Gauze    Moistened? Yes    Moisten With Dakins Solution 1/4 Strength            Please take medications as prescribed. Please go to follow-up appointments as recommended. Please seek medical attention if you have any fevers/chills, worsening redness/drainage of wounds.     Please continue social distancing and isolation efforts as recommended by CDC and [facility] to help prevent spread of COVID-19.
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
## 2021-06-14 NOTE — DISCHARGE PLACEMENT REQUEST
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 9.2 mmol/L     Narrative:      GFR Normal >60  Chronic Kidney Disease <60  Kidney Failure <15      C-reactive Protein [[id number]]  (Abnormal) Collected: 06/14/21 0257    Specimen: Blood Updated: 06/14/21 0414     C-Reactive Protein 1.47 mg/dL     CBC (No Diff) [[id number]]  (Abnormal) Collected: 06/14/21 0257    Specimen: Blood Updated: 06/14/21 0358     WBC 7.66 10*3/mm3      RBC 4.10 10*6/mm3      Hemoglobin 8.9 g/dL      Hematocrit 32.9 %      MCV 80.2 fL      MCH 21.7 pg      MCHC 27.1 g/dL      RDW 16.4 %      RDW-SD 47.0 fl      MPV 9.5 fL      Platelets 446 10*3/mm3     Blood Culture - Blood, Hand, Right [[id number]] Collected: 06/08/21 1229    Specimen: Blood from Hand, Right Updated: 06/13/21 1245     Blood Culture No growth at 5 days    Basic Metabolic Panel [[id number]]   (Abnormal) Collected: 06/13/21 0255    Specimen: Blood Updated: 06/13/21 0351     Glucose 295 mg/dL      BUN 27 mg/dL      Creatinine 0.64 mg/dL      Sodium 136 mmol/L      Potassium 4.6 mmol/L      Chloride 100 mmol/L      CO2 28.0 mmol/L      Calcium 9.8 mg/dL      eGFR Non African Amer 136 mL/min/1.73      BUN/Creatinine Ratio 42.2     Anion Gap 8.0 mmol/L     Narrative:      GFR Normal >60  Chronic Kidney Disease <60  Kidney Failure <15      C-reactive Protein [[id number]]  (Abnormal) Collected: 06/13/21 0255    Specimen: Blood Updated: 06/13/21 0351     C-Reactive Protein 1.70 mg/dL     CBC (No Diff) [[id number]]  (Abnormal) Collected: 06/13/21 0255    Specimen: Blood Updated: 06/13/21 0334     WBC 7.38 10*3/mm3      RBC 4.06 10*6/mm3      Hemoglobin 8.8 g/dL      Hematocrit 32.7 %      MCV 80.5 fL      MCH 21.7 pg      MCHC 26.9 g/dL      RDW 16.0 %      RDW-SD 46.0 fl      MPV 9.7 fL      Platelets 456 10*3/mm3     Magnesium [[id number]]  (Normal) Collected: 06/08/21 0055    Specimen: Blood Updated: 06/08/21 0244     Magnesium 2.1 mg/dL     COVID PRE-OP / PRE-PROCEDURE SCREENING ORDER (NO ISOLATION) - Swab, Nasopharynx [[id number]]  (Normal) Collected: 06/07/21 1718    Specimen: Swab from Nasopharynx Updated: 06/07/21 1811    Narrative:      The following orders were created for panel order COVID PRE-OP / PRE-PROCEDURE SCREENING ORDER (NO ISOLATION) - Swab, Nasopharynx.  Procedure                               Abnormality         Status                     ---------                               -----------         ------                     COVID-19,CEPHEID,COR/GABBY...[[id number]]  Normal              Final result                 Please view results for these tests on the individual orders.    COVID-19,CEPHEID,COR/GABBY/PAD/KRISTAL IN-HOUSE(OR EMERGENT/ADD-ON),NP SWAB IN TRANSPORT MEDIA 3-4 HR TAT, RT-PCR - Swab, Nasopharynx [[id number]]  (Normal) Collected: 06/07/21 3439    Specimen: Swab from Nasopharynx Updated:  06/07/21 1811     COVID19 Not Detected    Narrative:      Fact sheet for providers: https://www.fda.gov/media/636718/download     Fact sheet for patients: https://www.fda.gov/media/197004/download  Fact sheet for providers: https://www.fda.gov/media/964749/download     Fact sheet for patients: https://www.fda.gov/media/455348/download    Magnesium [[id number]]  (Normal) Collected: 06/07/21 0406    Specimen: Blood Updated: 06/07/21 0436     Magnesium 1.6 mg/dL     Blood Culture ID, PCR - Blood, Arm, Left [[id number]]  (Abnormal) Collected: 06/02/21 2157    Specimen: Blood from Arm, Left Updated: 06/04/21 2033     BCID, PCR Candida glabrata. Identification by BCID PCR.    Vancomycin, Trough [[id number]]  (Normal) Collected: 06/04/21 1145    Specimen: Blood Updated: 06/04/21 1225     Vancomycin Trough 8.60 mcg/mL     Narrative:      Therapeutic Ranges for Vancomycin    Vancomycin Random   5.0-40.0 mcg/mL  Vancomycin Trough   5.0-20.0 mcg/mL  Vancomycin Peak     20.0-40.0 mcg/mL    Urine Culture - Urine, Urine, Clean Catch [[id number]]  (Abnormal)  (Susceptibility) Collected: 06/02/21 2218    Specimen: Urine, Clean Catch Updated: 06/04/21 0954     Urine Culture >100,000 CFU/mL Serratia marcescens     Comment: Please call the lab if pip/devorah is requested for Serratia spp. Will have to be set up by disk diffusion.         Susceptibility      Serratia marcescens      ROGER      Cefazolin Resistant      Cefepime Susceptible      Ceftazidime Susceptible      Ceftriaxone Resistant      Gentamicin Susceptible      Levofloxacin Susceptible      Nitrofurantoin Resistant      Tetracycline Susceptible      Trimethoprim + Sulfamethoxazole Susceptible               Linear View                   Troponin [[id number]]  (Normal) Collected: 06/03/21 1728    Specimen: Blood Updated: 06/03/21 1802     Troponin T <0.010 ng/mL     Narrative:      Troponin T Reference Range:  <= 0.03 ng/mL-   Negative for AMI  >0.03 ng/mL-     Abnormal for  "myocardial necrosis.  Clinicians would have to utilize clinical acumen, EKG, Troponin and serial changes to determine if it is an Acute Myocardial Infarction or myocardial injury due to an underlying chronic condition.       Results may be falsely decreased if patient taking Biotin.      Potassium [[id number]]  (Normal) Collected: 06/03/21 1518    Specimen: Blood Updated: 06/03/21 1551     Potassium 3.7 mmol/L     Blood Culture ID, PCR - Blood, Arm, Right [[id number]]  (Abnormal) Collected: 06/02/21 2157    Specimen: Blood from Arm, Right Updated: 06/03/21 1352     BCID, PCR Streptococcus agalactiae (Group B). Identification by BCID PCR.    Procalcitonin [[id number]]  (Abnormal) Collected: 06/02/21 2110    Specimen: Blood Updated: 06/03/21 1321     Procalcitonin 0.30 ng/mL     Narrative:      As a Marker for Sepsis (Non-Neonates):     1. <0.5 ng/mL represents a low risk of severe sepsis and/or septic shock.  2. >2 ng/mL represents a high risk of severe sepsis and/or septic shock.    As a Marker for Lower Respiratory Tract Infections that require antibiotic therapy:  PCT on Admission     Antibiotic Therapy             6-12 Hrs later  >0.5                          Strongly Recommended            >0.25 - <0.5             Recommended  0.1 - 0.25                  Discouraged                       Remeasure/reassess PCT  <0.1                         Strongly Discouraged         Remeasure/reassess PCT      As 28 day mortality risk marker: \"Change in Procalcitonin Result\" (>80% or <=80%) if Day 0 (or Day 1) and Day 4 values are available. Refer to http://www.[facility]-pct-calculator.com/    Change in PCT <=80 %   A decrease of PCT levels below or equal to 80% defines a positive change in PCT test result representing a higher risk for 28-day all-cause mortality of patients diagnosed with severe sepsis or septic shock.    Change in PCT >80 %   A decrease of PCT levels of more than 80% defines a negative change in PCT result " representing a lower risk for 28-day all-cause mortality of patients diagnosed with severe sepsis or septic shock.              Results may be falsely decreased if patient taking Biotin.     CBC & Differential [[id number]]  (Abnormal) Collected: 06/03/21 0317    Specimen: Blood Updated: 06/03/21 0425    Narrative:      The following orders were created for panel order CBC & Differential.  Procedure                               Abnormality         Status                     ---------                               -----------         ------                     Scan Slide[[id number]]                                       Final result               CBC Auto Differential[[id number]]        Abnormal            Final result                 Please view results for these tests on the individual orders.    Scan Slide [[id number]] Collected: 06/03/21 0317    Specimen: Blood Updated: 06/03/21 0425     Hypochromia Mod/2+     Microcytes Mod/2+     Platelet Morphology Normal    CBC Auto Differential [[id number]]  (Abnormal) Collected: 06/03/21 0317    Specimen: Blood Updated: 06/03/21 0425     WBC 8.56 10*3/mm3      RBC 3.59 10*6/mm3      Hemoglobin 7.7 g/dL      Hematocrit 27.7 %      MCV 77.2 fL      MCH 21.4 pg      MCHC 27.8 g/dL      RDW 15.6 %      RDW-SD 43.3 fl      MPV 9.7 fL      Platelets 420 10*3/mm3      Neutrophil % 71.2 %      Lymphocyte % 20.9 %      Monocyte % 4.9 %      Eosinophil % 1.5 %      Basophil % 0.4 %      Immature Grans % 1.1 %      Neutrophils, Absolute 6.10 10*3/mm3      Lymphocytes, Absolute 1.79 10*3/mm3      Monocytes, Absolute 0.42 10*3/mm3      Eosinophils, Absolute 0.13 10*3/mm3      Basophils, Absolute 0.03 10*3/mm3      Immature Grans, Absolute 0.09 10*3/mm3      nRBC 0.2 /100 WBC     Comprehensive Metabolic Panel [[id number]]  (Abnormal) Collected: 06/03/21 0317    Specimen: Blood Updated: 06/03/21 0339     Glucose 362 mg/dL      BUN 3 mg/dL      Creatinine 0.62 mg/dL      Sodium 132 mmol/L       Potassium 3.4 mmol/L      Chloride 99 mmol/L      CO2 21.9 mmol/L      Calcium 8.3 mg/dL      Total Protein 6.9 g/dL      Albumin 2.29 g/dL      ALT (SGPT) 22 U/L      AST (SGOT) 19 U/L      Alkaline Phosphatase 133 U/L      Total Bilirubin 0.3 mg/dL      eGFR Non African Amer 142 mL/min/1.73      Globulin 4.6 gm/dL      A/G Ratio 0.5 g/dL      BUN/Creatinine Ratio 4.8     Anion Gap 11.1 mmol/L     Narrative:      GFR Normal >60  Chronic Kidney Disease <60  Kidney Failure <15      Timed Lactic Acid, Reflex [[id number]]  (Normal) Collected: 06/03/21 0058    Specimen: Blood Updated: 06/03/21 0127     Lactate 2.0 mmol/L     COVID-19 and FLU A/B PCR - Swab, Nasopharynx [[id number]]  (Normal) Collected: 06/03/21 0021    Specimen: Swab from Nasopharynx Updated: 06/03/21 0052     COVID19 Not Detected     Influenza A PCR Not Detected     Influenza B PCR Not Detected    Narrative:      Fact sheet for providers: https://www.fda.gov/media/770849/download    Fact sheet for patients: https://www.fda.gov/media/938712/download    Test performed by PCR.    Troponin [[id number]]  (Normal) Collected: 06/02/21 2328    Specimen: Blood Updated: 06/02/21 2351     Troponin T <0.010 ng/mL     Narrative:      Troponin T Reference Range:  <= 0.03 ng/mL-   Negative for AMI  >0.03 ng/mL-     Abnormal for myocardial necrosis.  Clinicians would have to utilize clinical acumen, EKG, Troponin and serial changes to determine if it is an Acute Myocardial Infarction or myocardial injury due to an underlying chronic condition.       Results may be falsely decreased if patient taking Biotin.      Urinalysis, Microscopic Only - Urine, Clean Catch [[id number]]  (Abnormal) Collected: 06/02/21 2218    Specimen: Urine, Clean Catch Updated: 06/02/21 2258     RBC, UA 0-2 /HPF      WBC, UA 13-20 /HPF      Bacteria, UA 1+ /HPF      Squamous Epithelial Cells, UA 0-2 /HPF      Yeast, UA Large/3+ Budding Yeast /HPF      Hyaline Casts, UA None Seen /LPF       Methodology Manual Light Microscopy    Urinalysis With Culture If Indicated - Urine, Clean Catch [[id number]]  (Abnormal) Collected: 06/02/21 2218    Specimen: Urine, Clean Catch Updated: 06/02/21 2258     Color, UA Yellow     Appearance, UA Turbid     pH, UA 6.5     Specific Gravity, UA 1.028     Glucose, UA >=1000 mg/dL (3+)     Ketones, UA Negative     Bilirubin, UA Negative     Blood, UA Trace     Protein, UA Trace     Leuk Esterase, UA Trace     Nitrite, UA Positive     Urobilinogen, UA 0.2 E.U./dL    TSH [[id number]]  (Normal) Collected: 06/02/21 2110    Specimen: Blood from Arm, Right Updated: 06/02/21 2258     TSH 0.876 uIU/mL     Urine Drug Screen - Urine, Clean Catch [[id number]]  (Normal) Collected: 06/02/21 2218    Specimen: Urine, Clean Catch Updated: 06/02/21 2244     Amphetamine Screen, Urine Negative     Barbiturates Screen, Urine Negative     Benzodiazepine Screen, Urine Negative     Cocaine Screen, Urine Negative     Methadone Screen, Urine Negative     Opiate Screen Negative     Phencyclidine (PCP), Urine Negative     THC, Screen, Urine Negative     6-ACETYL MORPHINE Negative     Buprenorphine, Screen, Urine Negative     Oxycodone Screen, Urine Negative    Narrative:      Negative Thresholds For Drugs Screened:                  Amphetamines              1000 ng/ml               Barbiturates               200 ng/ml               Benzodiazepines            200 ng/ml              Cocaine                    300 ng/ml              Methadone                  300 ng/ml              Opiates                    300 ng/ml               Phencyclidine               25 ng/ml               THC                         50 ng/ml              6-Acetyl Morphine           10 ng/ml              Buprenorphine                5 ng/ml              Oxycodone                  300 ng/ml    The reference range for all drugs tested is negative. This report includes final unconfirmed qualitative results to be used for medical  treatment purposes only. Unconfirmed results must not be used for non-medical purposes such as employment or legal testing. Clinical consideration should be applied to any drug of abuse test, especially when unconfirmed quantitative results are used.        T4, Free [[id number]]  (Normal) Collected: 06/02/21 2110    Specimen: Blood from Arm, Right Updated: 06/02/21 2204     Free T4 1.31 ng/dL     Narrative:      Results may be falsely increased if patient taking Biotin.      Comprehensive Metabolic Panel [[id number]]  (Abnormal) Collected: 06/02/21 2110    Specimen: Blood from Arm, Right Updated: 06/02/21 2202     Glucose 614 mg/dL      BUN 3 mg/dL      Creatinine 0.80 mg/dL      Sodium 128 mmol/L      Potassium 3.9 mmol/L      Chloride 92 mmol/L      CO2 20.5 mmol/L      Calcium 8.7 mg/dL      Total Protein 7.8 g/dL      Albumin 2.69 g/dL      ALT (SGPT) 26 U/L      AST (SGOT) 27 U/L      Alkaline Phosphatase 157 U/L      Total Bilirubin 0.3 mg/dL      eGFR Non African Amer 106 mL/min/1.73      Globulin 5.1 gm/dL      A/G Ratio 0.5 g/dL      BUN/Creatinine Ratio 3.8     Anion Gap 15.5 mmol/L     Narrative:      GFR Normal >60  Chronic Kidney Disease <60  Kidney Failure <15      Lactic Acid, Plasma [[id number]]  (Abnormal) Collected: 06/02/21 2110    Specimen: Blood Updated: 06/02/21 2201     Lactate 5.8 mmol/L     CK [[id number]]  (Normal) Collected: 06/02/21 2110    Specimen: Blood from Arm, Right Updated: 06/02/21 2159     Creatine Kinase 22 U/L     Ethanol [[id number]] Collected: 06/02/21 2110    Specimen: Blood from Arm, Right Updated: 06/02/21 2159     Ethanol <10 mg/dL      Ethanol % <0.010 %     Narrative:      >/= 80.0 legally intoxicated    BNP [[id number]]  (Abnormal) Collected: 06/02/21 2110    Specimen: Blood from Arm, Right Updated: 06/02/21 2157     proBNP 1,812.0 pg/mL     Narrative:      Among patients with dyspnea, NT-proBNP is highly sensitive for the detection of acute congestive heart failure. In  addition NT-proBNP of <300 pg/ml effectively rules out acute congestive heart failure with 99% negative predictive value.    Results may be falsely decreased if patient taking Biotin.      CBC & Differential [[id number]]  (Abnormal) Collected: 06/02/21 2110    Specimen: Blood Updated: 06/02/21 2156    Narrative:      The following orders were created for panel order CBC & Differential.  Procedure                               Abnormality         Status                     ---------                               -----------         ------                     Scan Slide[[id number]]                                       Final result               CBC Auto Differential[[id number]]        Abnormal            Final result                 Please view results for these tests on the individual orders.    Scan Slide [[id number]] Collected: 06/02/21 2110    Specimen: Blood Updated: 06/02/21 2156     Anisocytosis Slight/1+     Hypochromia Slight/1+     Microcytes Mod/2+     Platelet Morphology Normal    CBC Auto Differential [[id number]]  (Abnormal) Collected: 06/02/21 2110    Specimen: Blood Updated: 06/02/21 2156     WBC 10.08 10*3/mm3      RBC 3.83 10*6/mm3      Hemoglobin 8.5 g/dL      Hematocrit 30.0 %      MCV 78.3 fL      MCH 22.2 pg      MCHC 28.3 g/dL      RDW 15.5 %      RDW-SD 43.9 fl      MPV 9.9 fL      Platelets 488 10*3/mm3      Neutrophil % 83.5 %      Lymphocyte % 11.2 %      Monocyte % 3.9 %      Eosinophil % 0.5 %      Basophil % 0.3 %      Immature Grans % 0.6 %      Neutrophils, Absolute 8.42 10*3/mm3      Lymphocytes, Absolute 1.13 10*3/mm3      Monocytes, Absolute 0.39 10*3/mm3      Eosinophils, Absolute 0.05 10*3/mm3      Basophils, Absolute 0.03 10*3/mm3      Immature Grans, Absolute 0.06 10*3/mm3      nRBC 0.0 /100 WBC     Sedimentation Rate [[id number]]  (Abnormal) Collected: 06/02/21 2110    Specimen: Blood Updated: 06/02/21 2139     Sed Rate >100 mm/hr           Imaging Results (Most Recent)     Procedure  Component Value Units Date/Time    MRI Pelvis Without Contrast [[id number]] Collected: 06/04/21 1039     Updated: 06/04/21 1045    Narrative:      EXAM:    MR Pelvis Without Intravenous Contrast     EXAM DATE:    6/4/2021 8:00 AM     CLINICAL HISTORY:    Osteomyelitis suspected, pelvis, xray done; L03.90-Cellulitis,  unspecified; A41.9-Sepsis, unspecified organism; E87.2-Acidosis;  R07.89-Other chest pain; L89.309-Pressure ulcer of unspecified buttock,  unspecified stage     TECHNIQUE:    Multiplanar magnetic resonance images of the pelvis without  intravenous contrast.     COMPARISON:    CT 06/03/2021, MRI 02/11/2021     FINDINGS:    Bowel:  Unremarkable.  No obstruction.  No mucosal thickening.    Intraperitoneal space:  No pelvic free fluid.    Bladder:  See below.      Reproductive: Cystocele is noted and stable from the previous exam  extending into the expected location of the prostate and into the  bulbous region of the penile urethra.    Bones/joints:  Abnormal low T1 signal is present involving both the  right and left inferior pubic rami consistent with osteomyelitis.  Destructive changes are noted.  Left femoral head appears within normal  limits with no signal abnormalities. No avascular necrosis.  Sclerotic  features of the right femoral head stable from the previous exam.  Complete osteolysis of the right femoral neck is noted with superior  migration of the proximal right femur also stable from previous.  Severe  right acetabular hip joint osteoarthritis.  Advanced osteoarthritis left  acetabular hip joint.  No acute fracture.  No dislocation.    Soft tissues:  There are bilateral initial tuberosity region decubitus  ulcerations with edema of the soft tissues but no loculated fluid  collection to suggest mature abscess.  Likely combination of infectious  and inflammatory myositis of the adductor muscle groups and the  obturator muscle groups.    Vasculature:  Unremarkable.  No lower abdominal aortic  aneurysm.    Lymph nodes:  Enlarged inguinal lymph nodes are present and may be  reactive in etiology.    Other findings:  Small right hip effusion.  Small left hip effusion.       Impression:      1.  Bilateral initial tuberosity region decubitus ulcers with  surrounding soft tissue inflammation/infection but no loculated abscess.  2.  Abnormal signal changes of the inferior pubic rami bilaterally  consistent with osteomyelitis.  3.  Secondary myositis of the obturator and adductor muscle groups.  4.  Chronic bilateral hip findings. No change from previous.  5. Enlarged probable reactive bilateral inguinal lymph nodes. Other  findings as above.     This report was finalized on 6/4/2021 10:43 AM by Dr. Sreedhar Gray MD.       CT Abdomen Pelvis With Contrast [[id number]] Collected: 06/03/21 0147     Updated: 06/03/21 0149    Narrative:      CT Abdomen Pelvis W    INDICATION:   Fever. History of paraplegia with decubitus ulcers.    TECHNIQUE:   CT of the abdomen and pelvis with IV contrast. Coronal and sagittal reconstructions were obtained.  Radiation dose reduction techniques included automated exposure control or exposure modulation based on body size. Count of known CT and cardiac nuc med  studies performed in previous 12 months: 1.     COMPARISON:   2/7/2021    FINDINGS:  There is minimal scarring or atelectasis in the lung bases. Gallbladder is surgically absent. No biliary obstruction is seen. There is a small nonobstructing stone in the lower pole of the left kidney. No ureteral stones are identified. There is a mild  degree of left-sided hydronephrosis that is new from prior. The patient has an ileal conduit on the right side that is draining the left kidney. It is unclear if it is also draining the right kidney. Correlation with a detailed surgical history  recommended. There is borderline enlargement of the spleen. Solid abdominal organs are otherwise within normal limits.    Urinary bladder is distended.  The prostate gland may be surgically absent. There is extension of the bladder base below the pelvic floor compatible with a cystocele. No definite bladder wall thickening. There is a descending colostomy. There is no  evidence of acute colitis. There are scattered colonic diverticula. The appendix is not definitely seen. There is no evidence of acute appendicitis. There is no small bowel obstruction. There is bilateral inguinal adenopathy, probably reactive.    There are large soft tissue ulcers in both ischial regions extending directly to the bone. These do contain some packing material. These are not significantly changed. No drainable fluid collection is seen. The right femoral neck is resected or eroded,  unchanged. The lower sacrum and coccyx is eroded or resected.      Impression:      1. Correlation with detailed surgical history is recommended.  2. Large bilateral ulcers in the ischial regions are unchanged from the prior CT. No drainable fluid collection is seen.  3. There is an ileal conduit at least draining the left kidney if not both. There is mild left hydronephrosis today.  4. Distended urinary bladder. The prostate gland may be surgically absent. There does appear to be a cystocele with the bladder extending below the pelvic floor.  5. Descending colostomy. No evidence of acute colitis, and no small bowel obstruction.  6. Cholecystectomy.  7. Additional findings as above.          Signer Name: Yohan Padron MD   Signed: 6/3/2021 1:47 AM   Workstation Name: [email or web address]    [facility]    XR Chest 1 View [[id number]] Collected: 06/02/21 2228     Updated: 06/02/21 2231    Narrative:      CR Chest 1 Vw    INDICATION:   Chest pain.     COMPARISON:    Chest x-ray from 2/13/2021    FINDINGS:  Single portable AP view(s) of the chest.  Limited exam. Cardiomediastinal silhouette is stable. Spinal fusion hardware is again noted. Lungs are grossly clear with probable prominent epicardial  fat pad. No pneumothorax or acute osseous abnormality.      Impression:      No acute cardiopulmonary findings. Stable exam.    Signer Name: Fransisca Posey MD   Signed: 2021 10:28 PM   Workstation Name: [id number]    [facility]        Operative/Procedure Notes (most recent note)    No notes of this type exist for this encounter.            Physician Progress Notes (most recent note)      Verenice Deluca APRN at 21 1028                     PROGRESS NOTE         Patient Identification:  Name:  Ken Krueger  Age:  45 y.o.  Sex:  male  :  1975  MRN:  [id number]  Visit Number:  [id number]  Primary Care Provider:  Franchesca Hodges APRN         LOS: 12 days       ----------------------------------------------------------------------------------------------------------------------  Subjective       Chief Complaints:    Chest Pain and Fever        Interval History:      Patient resting in bed this morning.  No issues or complaints.  Afebrile, no diarrhea.  WBC normal.  CRP improving at 1.47.    Review of Systems:    Constitutional: no fever, chills and night sweats. No appetite change or unexpected weight change. No fatigue.  Eyes: no eye drainage, itching or redness.  HEENT: no mouth sores, dysphagia or nose bleed.  Respiratory: no for shortness of breath, cough or production of sputum.  Cardiovascular: no chest pain, no palpitations, no orthopnea.  Gastrointestinal: no nausea, vomiting or diarrhea. No abdominal pain, hematemesis or rectal bleeding.  Genitourinary: no dysuria or polyuria.  Hematologic/lymphatic: no lymph node abnormalities, no easy bruising or easy bleeding.  Musculoskeletal: no muscle or joint pain.  Left AKA.  Skin: No rash and no itching.  Sacral wounds.  Neurological: no loss of consciousness, no seizure, no headache.  Paraplegia.  Behavioral/Psych: no depression or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

## 2021-06-14 NOTE — PLAN OF CARE
Goal Outcome Evaluation:  Plan of Care Reviewed With: patient        Progress: improving  Outcome Summary: Pt had visit from brother this shift. Wound care completed per orders. No complaints. No acute changes noted.

## 2021-06-14 NOTE — DISCHARGE INSTR - APPOINTMENTS
You have a scheduled follow-up appointment with Franchesca BLANC on 6/21/21 at 10:00.   You have a scheduled follow-up appointment with Dr. Brody on 7/19/21 at 10:15

## 2021-06-14 NOTE — PLAN OF CARE
Goal Outcome Evaluation:           Progress: improving  Outcome Summary: Patient has done well today. Tolerated wound care well. PICC line will not draw blood,  cathflo given, still not able to draw blood.  Chest xray completed, line in place. Spoke with PICC line staff about patient being discharged and they state if xray is clear pt can be discharged.  Dr Adrian made aware. Patient to be discharged tonight.

## 2021-06-15 ENCOUNTER — READMISSION MANAGEMENT (OUTPATIENT)
Dept: CALL CENTER | Facility: HOSPITAL | Age: 46
End: 2021-06-15

## 2021-06-15 NOTE — OUTREACH NOTE
Prep Survey      Responses   [facility] discharged from?  [facility]   Is LACE score < 7 ?  No   Emergency Room discharge w/ pulse ox?  No   Eligibility  Readm Mgmt   Discharge diagnosis  sepsis/UTI   Does the patient have one of the following disease processes/diagnoses(primary or secondary)?  Sepsis   Does the patient have Home health ordered?  Yes   What is the Home health agency?   [facility]   Is there a DME ordered?  No   Comments regarding appointments  Needs f/U   Medication alerts for this patient  See AVS   Prep survey completed?  Yes          Michelle Pablo RN

## 2021-06-18 ENCOUNTER — READMISSION MANAGEMENT (OUTPATIENT)
Dept: CALL CENTER | Facility: HOSPITAL | Age: 46
End: 2021-06-18
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
## 2021-06-22 ENCOUNTER — TRANSCRIBE ORDERS (OUTPATIENT)
Dept: ADMINISTRATIVE | Facility: HOSPITAL | Age: 46
End: 2021-06-22

## 2021-06-22 ENCOUNTER — LAB (OUTPATIENT)
Dept: LAB | Facility: HOSPITAL | Age: 46
End: 2021-06-22

## 2021-06-22 DIAGNOSIS — L89.324 STAGE IV PRESSURE ULCER OF LEFT BUTTOCK (HCC): ICD-10-CM

## 2021-06-22 DIAGNOSIS — M86.559: ICD-10-CM

## 2021-06-22 DIAGNOSIS — L89.314 STAGE IV PRESSURE ULCER OF RIGHT BUTTOCK (HCC): ICD-10-CM

## 2021-06-22 DIAGNOSIS — Z43.3 ATTENTION TO COLOSTOMY (HCC): ICD-10-CM

## 2021-06-22 DIAGNOSIS — L89.314 STAGE IV PRESSURE ULCER OF RIGHT BUTTOCK (HCC): Primary | ICD-10-CM

## 2021-06-22 DIAGNOSIS — E11.69 DIABETES MELLITUS ASSOCIATED WITH HORMONAL ETIOLOGY (HCC): ICD-10-CM

## 2021-06-22 LAB
ALBUMIN SERPL-MCNC: 3.37 G/DL (ref 3.5–5.2)
ALBUMIN/GLOB SERPL: 0.8 G/DL
ALP SERPL-CCNC: 127 U/L (ref 39–117)
ALT SERPL W P-5'-P-CCNC: 25 U/L (ref 1–41)
ANION GAP SERPL CALCULATED.3IONS-SCNC: 10.6 MMOL/L (ref 5–15)
ANISOCYTOSIS BLD QL: NORMAL
AST SERPL-CCNC: 27 U/L (ref 1–40)
BASOPHILS # BLD AUTO: 0.04 10*3/MM3 (ref 0–0.2)
BASOPHILS NFR BLD AUTO: 0.6 % (ref 0–1.5)
BILIRUB SERPL-MCNC: 0.2 MG/DL (ref 0–1.2)
BUN SERPL-MCNC: 13 MG/DL (ref 6–20)
BUN/CREAT SERPL: 23.6 (ref 7–25)
CALCIUM SPEC-SCNC: 8.8 MG/DL (ref 8.6–10.5)
CHLORIDE SERPL-SCNC: 97 MMOL/L (ref 98–107)
CO2 SERPL-SCNC: 23.4 MMOL/L (ref 22–29)
CREAT SERPL-MCNC: 0.55 MG/DL (ref 0.76–1.27)
CRP SERPL-MCNC: 5.07 MG/DL (ref 0–0.5)
DEPRECATED RDW RBC AUTO: 48.4 FL (ref 37–54)
EOSINOPHIL # BLD AUTO: 0.18 10*3/MM3 (ref 0–0.4)
EOSINOPHIL NFR BLD AUTO: 2.8 % (ref 0.3–6.2)
ERYTHROCYTE [DISTWIDTH] IN BLOOD BY AUTOMATED COUNT: 16.9 % (ref 12.3–15.4)
ERYTHROCYTE [SEDIMENTATION RATE] IN BLOOD: 76 MM/HR (ref 0–15)
GFR SERPL CREATININE-BSD FRML MDRD: >150 ML/MIN/1.73
GLOBULIN UR ELPH-MCNC: 4.1 GM/DL
GLUCOSE SERPL-MCNC: 371 MG/DL (ref 65–99)
HCT VFR BLD AUTO: 32 % (ref 37.5–51)
HGB BLD-MCNC: 8.8 G/DL (ref 13–17.7)
HYPOCHROMIA BLD QL: NORMAL
IMM GRANULOCYTES # BLD AUTO: 0.03 10*3/MM3 (ref 0–0.05)
IMM GRANULOCYTES NFR BLD AUTO: 0.5 % (ref 0–0.5)
LARGE PLATELETS: NORMAL
LYMPHOCYTES # BLD AUTO: 1.22 10*3/MM3 (ref 0.7–3.1)
LYMPHOCYTES NFR BLD AUTO: 19.2 % (ref 19.6–45.3)
MCH RBC QN AUTO: 21.8 PG (ref 26.6–33)
MCHC RBC AUTO-ENTMCNC: 27.5 G/DL (ref 31.5–35.7)
MCV RBC AUTO: 79.4 FL (ref 79–97)
MICROCYTES BLD QL: NORMAL
MONOCYTES # BLD AUTO: 0.27 10*3/MM3 (ref 0.1–0.9)
MONOCYTES NFR BLD AUTO: 4.2 % (ref 5–12)
NEUTROPHILS NFR BLD AUTO: 4.62 10*3/MM3 (ref 1.7–7)
NEUTROPHILS NFR BLD AUTO: 72.7 % (ref 42.7–76)
NRBC BLD AUTO-RTO: 0 /100 WBC (ref 0–0.2)
OVALOCYTES BLD QL SMEAR: NORMAL
PLATELET # BLD AUTO: 371 10*3/MM3 (ref 140–450)
PMV BLD AUTO: 10.4 FL (ref 6–12)
POTASSIUM SERPL-SCNC: 4.4 MMOL/L (ref 3.5–5.2)
PROT SERPL-MCNC: 7.5 G/DL (ref 6–8.5)
RBC # BLD AUTO: 4.03 10*6/MM3 (ref 4.14–5.8)
SODIUM SERPL-SCNC: 131 MMOL/L (ref 136–145)
WBC # BLD AUTO: 6.36 10*3/MM3 (ref 3.4–10.8)

## 2021-06-22 PROCEDURE — 36415 COLL VENOUS BLD VENIPUNCTURE: CPT

## 2021-06-22 PROCEDURE — 85007 BL SMEAR W/DIFF WBC COUNT: CPT

## 2021-06-22 PROCEDURE — 86140 C-REACTIVE PROTEIN: CPT

## 2021-06-22 PROCEDURE — 85652 RBC SED RATE AUTOMATED: CPT

## 2021-06-22 PROCEDURE — 85025 COMPLETE CBC W/AUTO DIFF WBC: CPT

## 2021-06-22 PROCEDURE — 80053 COMPREHEN METABOLIC PANEL: CPT

## 2021-06-23 ENCOUNTER — HOSPITAL ENCOUNTER (OUTPATIENT)
Dept: WOUND CARE | Facility: HOSPITAL | Age: 46
Discharge: HOME | End: 2021-06-23
Admitting: NURSE PRACTITIONER

## 2021-06-23 VITALS
HEART RATE: 98 BPM | DIASTOLIC BLOOD PRESSURE: 87 MMHG | TEMPERATURE: 98 F | SYSTOLIC BLOOD PRESSURE: 178 MMHG | RESPIRATION RATE: 18 BRPM

## 2021-06-23 DIAGNOSIS — L89.324 PRESSURE INJURY OF LEFT BUTTOCK, STAGE 4 (HCC): ICD-10-CM

## 2021-06-23 DIAGNOSIS — L89.314 PRESSURE INJURY OF RIGHT BUTTOCK, STAGE 4 (HCC): Primary | ICD-10-CM

## 2021-06-23 DIAGNOSIS — L89.002 PRESSURE INJURY OF ELBOW, STAGE 2, UNSPECIFIED LATERALITY (HCC): ICD-10-CM

## 2021-06-23 PROCEDURE — A9270 NON-COVERED ITEM OR SERVICE: HCPCS | Performed by: NURSE PRACTITIONER

## 2021-06-23 PROCEDURE — 63710000001 MAGIC BARRIER CREAM 60 G BOTTLE: Performed by: NURSE PRACTITIONER

## 2021-06-23 PROCEDURE — 97602 WOUND(S) CARE NON-SELECTIVE: CPT

## 2021-06-23 PROCEDURE — 63710000001 SODIUM HYPOCHLORITE 0.25 % SOLUTION: Performed by: NURSE PRACTITIONER

## 2021-06-23 RX ORDER — LIDOCAINE HYDROCHLORIDE 20 MG/ML
JELLY TOPICAL AS NEEDED
Status: CANCELLED | OUTPATIENT
Start: 2021-07-07

## 2021-06-23 RX ORDER — CASTOR OIL AND BALSAM, PERU 788; 87 MG/G; MG/G
1 OINTMENT TOPICAL AS NEEDED
Status: CANCELLED | OUTPATIENT
Start: 2021-07-07

## 2021-06-23 RX ORDER — SODIUM HYPOCHLORITE 1.25 MG/ML
1 SOLUTION TOPICAL AS NEEDED
Status: CANCELLED | OUTPATIENT
Start: 2021-06-23

## 2021-06-23 RX ORDER — SODIUM HYPOCHLORITE 2.5 MG/ML
1 SOLUTION TOPICAL AS NEEDED
Status: CANCELLED | OUTPATIENT
Start: 2021-07-07

## 2021-06-23 RX ORDER — SODIUM HYPOCHLORITE 2.5 MG/ML
1 SOLUTION TOPICAL AS NEEDED
Status: DISCONTINUED | OUTPATIENT
Start: 2021-06-23 | End: 2021-06-24 | Stop reason: HOSPADM

## 2021-06-23 RX ADMIN — Medication 1 APPLICATION: at 14:54

## 2021-06-23 RX ADMIN — HYOSCYAMINE SULFATE 473 ML: 16 SOLUTION at 14:55
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
## 2021-06-25 ENCOUNTER — READMISSION MANAGEMENT (OUTPATIENT)
Dept: CALL CENTER | Facility: HOSPITAL | Age: 46
End: 2021-06-25
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
## 2021-07-02 ENCOUNTER — READMISSION MANAGEMENT (OUTPATIENT)
Dept: CALL CENTER | Facility: HOSPITAL | Age: 46
End: 2021-07-02

## 2021-07-02 NOTE — OUTREACH NOTE
Sepsis Week 3 Survey      Responses   [facility] patient discharged from?  [facility]   Does the patient have one of the following disease processes/diagnoses(primary or secondary)?  Sepsis   Week 3 attempt successful?  No   Unsuccessful attempts  Attempt 1          Merissa Nice RN

## 2021-07-06 ENCOUNTER — READMISSION MANAGEMENT (OUTPATIENT)
Dept: CALL CENTER | Facility: HOSPITAL | Age: 46
End: 2021-07-06

## 2021-07-06 NOTE — OUTREACH NOTE
Sepsis Week 3 Survey      Responses   [facility] patient discharged from?  [facility]   Does the patient have one of the following disease processes/diagnoses(primary or secondary)?  Sepsis   Week 3 attempt successful?  No   Unsuccessful attempts  Attempt 2          Merissa Nice RN

## 2021-07-07 ENCOUNTER — HOSPITAL ENCOUNTER (OUTPATIENT)
Dept: WOUND CARE | Facility: HOSPITAL | Age: 46
Discharge: HOME | End: 2021-07-07
Admitting: NURSE PRACTITIONER

## 2021-07-07 VITALS
HEART RATE: 81 BPM | RESPIRATION RATE: 20 BRPM | SYSTOLIC BLOOD PRESSURE: 130 MMHG | TEMPERATURE: 98.3 F | DIASTOLIC BLOOD PRESSURE: 71 MMHG

## 2021-07-07 DIAGNOSIS — L89.324 PRESSURE INJURY OF LEFT BUTTOCK, STAGE 4 (HCC): ICD-10-CM

## 2021-07-07 DIAGNOSIS — L89.002 PRESSURE INJURY OF ELBOW, STAGE 2, UNSPECIFIED LATERALITY (HCC): ICD-10-CM

## 2021-07-07 DIAGNOSIS — L89.314 PRESSURE INJURY OF RIGHT BUTTOCK, STAGE 4 (HCC): Primary | ICD-10-CM

## 2021-07-07 PROCEDURE — 97602 WOUND(S) CARE NON-SELECTIVE: CPT

## 2021-07-07 PROCEDURE — 63710000001 MAGIC BARRIER CREAM 60 G BOTTLE: Performed by: NURSE PRACTITIONER

## 2021-07-07 PROCEDURE — A9270 NON-COVERED ITEM OR SERVICE: HCPCS | Performed by: NURSE PRACTITIONER

## 2021-07-07 RX ORDER — LIDOCAINE HYDROCHLORIDE 20 MG/ML
JELLY TOPICAL AS NEEDED
Status: CANCELLED | OUTPATIENT
Start: 2021-07-21

## 2021-07-07 RX ORDER — CASTOR OIL AND BALSAM, PERU 788; 87 MG/G; MG/G
1 OINTMENT TOPICAL AS NEEDED
Status: CANCELLED | OUTPATIENT
Start: 2021-07-21

## 2021-07-07 RX ORDER — SODIUM HYPOCHLORITE 1.25 MG/ML
1 SOLUTION TOPICAL AS NEEDED
Status: CANCELLED | OUTPATIENT
Start: 2021-08-11

## 2021-07-07 RX ORDER — SODIUM HYPOCHLORITE 2.5 MG/ML
1 SOLUTION TOPICAL AS NEEDED
Status: CANCELLED | OUTPATIENT
Start: 2021-07-21

## 2021-07-07 RX ADMIN — Medication 1 APPLICATION: at 14:28
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
## 2021-07-21 ENCOUNTER — HOSPITAL ENCOUNTER (OUTPATIENT)
Dept: WOUND CARE | Facility: HOSPITAL | Age: 46
Discharge: HOME | End: 2021-07-21
Admitting: NURSE PRACTITIONER

## 2021-07-21 VITALS
DIASTOLIC BLOOD PRESSURE: 67 MMHG | TEMPERATURE: 98 F | HEART RATE: 72 BPM | RESPIRATION RATE: 16 BRPM | SYSTOLIC BLOOD PRESSURE: 140 MMHG

## 2021-07-21 DIAGNOSIS — L89.314 PRESSURE INJURY OF RIGHT BUTTOCK, STAGE 4 (HCC): Primary | ICD-10-CM

## 2021-07-21 DIAGNOSIS — L89.153 PRESSURE INJURY OF SACRAL REGION, STAGE 3 (HCC): ICD-10-CM

## 2021-07-21 DIAGNOSIS — L89.002 PRESSURE INJURY OF ELBOW, STAGE 2, UNSPECIFIED LATERALITY (HCC): ICD-10-CM

## 2021-07-21 DIAGNOSIS — L89.324 PRESSURE INJURY OF LEFT BUTTOCK, STAGE 4 (HCC): ICD-10-CM

## 2021-07-21 PROCEDURE — A9270 NON-COVERED ITEM OR SERVICE: HCPCS | Performed by: NURSE PRACTITIONER

## 2021-07-21 PROCEDURE — 63710000001 MAGIC BARRIER CREAM 60 G BOTTLE: Performed by: NURSE PRACTITIONER

## 2021-07-21 PROCEDURE — 97602 WOUND(S) CARE NON-SELECTIVE: CPT

## 2021-07-21 RX ORDER — LIDOCAINE HYDROCHLORIDE 20 MG/ML
JELLY TOPICAL AS NEEDED
Status: CANCELLED | OUTPATIENT
Start: 2021-08-11

## 2021-07-21 RX ORDER — SODIUM HYPOCHLORITE 1.25 MG/ML
1 SOLUTION TOPICAL AS NEEDED
Status: CANCELLED | OUTPATIENT
Start: 2021-08-11

## 2021-07-21 RX ORDER — SODIUM HYPOCHLORITE 2.5 MG/ML
1 SOLUTION TOPICAL AS NEEDED
Status: CANCELLED | OUTPATIENT
Start: 2021-08-11

## 2021-07-21 RX ORDER — CASTOR OIL AND BALSAM, PERU 788; 87 MG/G; MG/G
1 OINTMENT TOPICAL AS NEEDED
Status: CANCELLED | OUTPATIENT
Start: 2021-08-11

## 2021-07-21 RX ADMIN — Medication 1 APPLICATION: at 13:57
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
## 2021-08-01 ENCOUNTER — HOSPITAL ENCOUNTER (EMERGENCY)
Facility: HOSPITAL | Age: 46
Discharge: HOME | End: 2021-08-01
Attending: EMERGENCY MEDICINE | Admitting: EMERGENCY MEDICINE

## 2021-08-01 VITALS
HEIGHT: 71 IN | OXYGEN SATURATION: 98 % | HEART RATE: 92 BPM | DIASTOLIC BLOOD PRESSURE: 74 MMHG | SYSTOLIC BLOOD PRESSURE: 132 MMHG | RESPIRATION RATE: 18 BRPM | TEMPERATURE: 97.1 F | WEIGHT: 300 LBS | BODY MASS INDEX: 42 KG/M2

## 2021-08-01 DIAGNOSIS — Z78.9 PROBLEM WITH VASCULAR ACCESS: Primary | ICD-10-CM

## 2021-08-01 PROCEDURE — 99282 EMERGENCY DEPT VISIT SF MDM: CPT
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
## 2021-08-09 ENCOUNTER — LAB (OUTPATIENT)
Dept: LAB | Facility: HOSPITAL | Age: 46
End: 2021-08-09

## 2021-08-09 ENCOUNTER — TRANSCRIBE ORDERS (OUTPATIENT)
Dept: ADMINISTRATIVE | Facility: HOSPITAL | Age: 46
End: 2021-08-09

## 2021-08-09 DIAGNOSIS — E11.610 DIABETIC NEUROGENIC ARTHROPATHY (HCC): ICD-10-CM

## 2021-08-09 DIAGNOSIS — B96.89 BACTERIAL CHOLANGITIS: ICD-10-CM

## 2021-08-09 DIAGNOSIS — K83.09 BACTERIAL CHOLANGITIS: ICD-10-CM

## 2021-08-09 DIAGNOSIS — L89.314 STAGE IV PRESSURE ULCER OF RIGHT BUTTOCK (HCC): ICD-10-CM

## 2021-08-09 DIAGNOSIS — L89.324 STAGE IV PRESSURE ULCER OF LEFT BUTTOCK (HCC): ICD-10-CM

## 2021-08-09 DIAGNOSIS — Z45.2 FITTING AND ADJUSTMENT OF VASCULAR CATHETER: ICD-10-CM

## 2021-08-09 DIAGNOSIS — L89.314 STAGE IV PRESSURE ULCER OF RIGHT BUTTOCK (HCC): Primary | ICD-10-CM

## 2021-08-09 LAB
ALBUMIN SERPL-MCNC: 3.4 G/DL (ref 3.5–5.2)
ALBUMIN/GLOB SERPL: 0.8 G/DL
ALP SERPL-CCNC: 137 U/L (ref 39–117)
ALT SERPL W P-5'-P-CCNC: 20 U/L (ref 1–41)
ANION GAP SERPL CALCULATED.3IONS-SCNC: 14.1 MMOL/L (ref 5–15)
ANISOCYTOSIS BLD QL: NORMAL
AST SERPL-CCNC: 16 U/L (ref 1–40)
BASOPHILS # BLD AUTO: 0.03 10*3/MM3 (ref 0–0.2)
BASOPHILS NFR BLD AUTO: 0.3 % (ref 0–1.5)
BILIRUB SERPL-MCNC: 0.2 MG/DL (ref 0–1.2)
BUN SERPL-MCNC: 9 MG/DL (ref 6–20)
BUN/CREAT SERPL: 12.3 (ref 7–25)
CALCIUM SPEC-SCNC: 9 MG/DL (ref 8.6–10.5)
CHLORIDE SERPL-SCNC: 94 MMOL/L (ref 98–107)
CO2 SERPL-SCNC: 22.9 MMOL/L (ref 22–29)
CREAT SERPL-MCNC: 0.73 MG/DL (ref 0.76–1.27)
CRP SERPL-MCNC: 9.5 MG/DL (ref 0–0.5)
DEPRECATED RDW RBC AUTO: 43.8 FL (ref 37–54)
EOSINOPHIL # BLD AUTO: 0.13 10*3/MM3 (ref 0–0.4)
EOSINOPHIL NFR BLD AUTO: 1.5 % (ref 0.3–6.2)
ERYTHROCYTE [DISTWIDTH] IN BLOOD BY AUTOMATED COUNT: 16.4 % (ref 12.3–15.4)
ERYTHROCYTE [SEDIMENTATION RATE] IN BLOOD: 78 MM/HR (ref 0–15)
GFR SERPL CREATININE-BSD FRML MDRD: 117 ML/MIN/1.73
GLOBULIN UR ELPH-MCNC: 4.1 GM/DL
GLUCOSE SERPL-MCNC: 574 MG/DL (ref 65–99)
HCT VFR BLD AUTO: 33.2 % (ref 37.5–51)
HGB BLD-MCNC: 8.8 G/DL (ref 13–17.7)
HYPOCHROMIA BLD QL: NORMAL
IMM GRANULOCYTES # BLD AUTO: 0.02 10*3/MM3 (ref 0–0.05)
IMM GRANULOCYTES NFR BLD AUTO: 0.2 % (ref 0–0.5)
LYMPHOCYTES # BLD AUTO: 1.77 10*3/MM3 (ref 0.7–3.1)
LYMPHOCYTES NFR BLD AUTO: 20.4 % (ref 19.6–45.3)
MCH RBC QN AUTO: 19.7 PG (ref 26.6–33)
MCHC RBC AUTO-ENTMCNC: 26.5 G/DL (ref 31.5–35.7)
MCV RBC AUTO: 74.3 FL (ref 79–97)
MICROCYTES BLD QL: NORMAL
MONOCYTES # BLD AUTO: 0.27 10*3/MM3 (ref 0.1–0.9)
MONOCYTES NFR BLD AUTO: 3.1 % (ref 5–12)
NEUTROPHILS NFR BLD AUTO: 6.45 10*3/MM3 (ref 1.7–7)
NEUTROPHILS NFR BLD AUTO: 74.5 % (ref 42.7–76)
NRBC BLD AUTO-RTO: 0 /100 WBC (ref 0–0.2)
PLATELET # BLD AUTO: 439 10*3/MM3 (ref 140–450)
PMV BLD AUTO: 10 FL (ref 6–12)
POTASSIUM SERPL-SCNC: 4.3 MMOL/L (ref 3.5–5.2)
PROT SERPL-MCNC: 7.5 G/DL (ref 6–8.5)
RBC # BLD AUTO: 4.47 10*6/MM3 (ref 4.14–5.8)
SMALL PLATELETS BLD QL SMEAR: NORMAL
SODIUM SERPL-SCNC: 131 MMOL/L (ref 136–145)
WBC # BLD AUTO: 8.67 10*3/MM3 (ref 3.4–10.8)

## 2021-08-09 PROCEDURE — 85025 COMPLETE CBC W/AUTO DIFF WBC: CPT

## 2021-08-09 PROCEDURE — 36415 COLL VENOUS BLD VENIPUNCTURE: CPT

## 2021-08-09 PROCEDURE — 85007 BL SMEAR W/DIFF WBC COUNT: CPT

## 2021-08-09 PROCEDURE — 80053 COMPREHEN METABOLIC PANEL: CPT

## 2021-08-09 PROCEDURE — 86140 C-REACTIVE PROTEIN: CPT

## 2021-08-09 PROCEDURE — 85652 RBC SED RATE AUTOMATED: CPT

## 2021-08-11 ENCOUNTER — HOSPITAL ENCOUNTER (OUTPATIENT)
Dept: WOUND CARE | Facility: HOSPITAL | Age: 46
Discharge: HOME | End: 2021-08-11
Admitting: NURSE PRACTITIONER

## 2021-08-11 VITALS
HEART RATE: 90 BPM | SYSTOLIC BLOOD PRESSURE: 130 MMHG | TEMPERATURE: 97.9 F | RESPIRATION RATE: 22 BRPM | DIASTOLIC BLOOD PRESSURE: 77 MMHG

## 2021-08-11 DIAGNOSIS — L89.002 PRESSURE INJURY OF ELBOW, STAGE 2, UNSPECIFIED LATERALITY (HCC): ICD-10-CM

## 2021-08-11 DIAGNOSIS — L89.324 PRESSURE INJURY OF LEFT BUTTOCK, STAGE 4 (HCC): ICD-10-CM

## 2021-08-11 DIAGNOSIS — L89.153 PRESSURE INJURY OF SACRAL REGION, STAGE 3 (HCC): ICD-10-CM

## 2021-08-11 DIAGNOSIS — L89.314 PRESSURE INJURY OF RIGHT BUTTOCK, STAGE 4 (HCC): Primary | ICD-10-CM

## 2021-08-11 PROCEDURE — A9270 NON-COVERED ITEM OR SERVICE: HCPCS | Performed by: NURSE PRACTITIONER

## 2021-08-11 PROCEDURE — 63710000001 MAGIC BARRIER CREAM 60 G BOTTLE: Performed by: NURSE PRACTITIONER

## 2021-08-11 RX ORDER — LIDOCAINE HYDROCHLORIDE 20 MG/ML
JELLY TOPICAL AS NEEDED
Status: CANCELLED | OUTPATIENT
Start: 2021-09-08

## 2021-08-11 RX ORDER — SODIUM HYPOCHLORITE 2.5 MG/ML
1 SOLUTION TOPICAL AS NEEDED
Status: CANCELLED | OUTPATIENT
Start: 2021-09-08

## 2021-08-11 RX ORDER — SODIUM HYPOCHLORITE 1.25 MG/ML
1 SOLUTION TOPICAL AS NEEDED
Status: CANCELLED | OUTPATIENT
Start: 2021-09-15

## 2021-08-11 RX ORDER — BALSAM PERU/CASTOR OIL
1 OINTMENT (GRAM) TOPICAL AS NEEDED
Status: CANCELLED | OUTPATIENT
Start: 2021-09-08

## 2021-08-11 RX ADMIN — Medication 1 APPLICATION: at 14:07
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
## 2021-08-25 ENCOUNTER — APPOINTMENT (OUTPATIENT)
Dept: WOUND CARE | Facility: HOSPITAL | Age: 46
End: 2021-08-25

## 2021-08-31 NOTE — ADDENDUM NOTE
Encounter addended by: Cathie Handy APRN on: 8/31/2021 10:58 AM   Actions taken: Clinical Note Signed

## 2021-09-01 ENCOUNTER — APPOINTMENT (OUTPATIENT)
Dept: WOUND CARE | Facility: HOSPITAL | Age: 46
End: 2021-09-01

## 2021-09-08 ENCOUNTER — HOSPITAL ENCOUNTER (OUTPATIENT)
Dept: WOUND CARE | Facility: HOSPITAL | Age: 46
End: 2021-09-08

## 2021-09-15 ENCOUNTER — HOSPITAL ENCOUNTER (OUTPATIENT)
Dept: WOUND CARE | Facility: HOSPITAL | Age: 46
End: 2021-09-15

## 2021-09-22 ENCOUNTER — HOSPITAL ENCOUNTER (OUTPATIENT)
Dept: WOUND CARE | Facility: HOSPITAL | Age: 46
Discharge: HOME | End: 2021-09-22

## 2021-09-29 ENCOUNTER — HOSPITAL ENCOUNTER (OUTPATIENT)
Dept: WOUND CARE | Facility: HOSPITAL | Age: 46
Discharge: HOME | End: 2021-09-29
Admitting: NURSE PRACTITIONER

## 2021-09-29 VITALS
HEART RATE: 68 BPM | TEMPERATURE: 98 F | DIASTOLIC BLOOD PRESSURE: 60 MMHG | SYSTOLIC BLOOD PRESSURE: 100 MMHG | RESPIRATION RATE: 17 BRPM

## 2021-09-29 DIAGNOSIS — L89.002 PRESSURE INJURY OF ELBOW, STAGE 2, UNSPECIFIED LATERALITY (HCC): Primary | ICD-10-CM

## 2021-09-29 DIAGNOSIS — L89.153 PRESSURE INJURY OF SACRAL REGION, STAGE 3 (HCC): ICD-10-CM

## 2021-09-29 DIAGNOSIS — L89.324 PRESSURE INJURY OF LEFT BUTTOCK, STAGE 4 (HCC): ICD-10-CM

## 2021-09-29 DIAGNOSIS — L89.314 PRESSURE INJURY OF RIGHT BUTTOCK, STAGE 4 (HCC): ICD-10-CM

## 2021-09-29 PROCEDURE — A9270 NON-COVERED ITEM OR SERVICE: HCPCS | Performed by: NURSE PRACTITIONER

## 2021-09-29 PROCEDURE — 87205 SMEAR GRAM STAIN: CPT | Performed by: NURSE PRACTITIONER

## 2021-09-29 PROCEDURE — 87070 CULTURE OTHR SPECIMN AEROBIC: CPT | Performed by: NURSE PRACTITIONER

## 2021-09-29 PROCEDURE — 97602 WOUND(S) CARE NON-SELECTIVE: CPT

## 2021-09-29 PROCEDURE — 63710000001 MAGIC BARRIER CREAM 60 G BOTTLE: Performed by: NURSE PRACTITIONER

## 2021-09-29 PROCEDURE — 63710000001 SODIUM HYPOCHLORITE 0.25 % SOLUTION 473 ML BOTTLE: Performed by: NURSE PRACTITIONER

## 2021-09-29 RX ORDER — SODIUM HYPOCHLORITE 2.5 MG/ML
1 SOLUTION TOPICAL AS NEEDED
Status: CANCELLED | OUTPATIENT
Start: 2021-10-13

## 2021-09-29 RX ORDER — SODIUM HYPOCHLORITE 2.5 MG/ML
1 SOLUTION TOPICAL AS NEEDED
Status: DISCONTINUED | OUTPATIENT
Start: 2021-09-29 | End: 2021-09-30 | Stop reason: HOSPADM

## 2021-09-29 RX ORDER — LIDOCAINE HYDROCHLORIDE 20 MG/ML
JELLY TOPICAL AS NEEDED
Status: CANCELLED | OUTPATIENT
Start: 2021-10-13

## 2021-09-29 RX ORDER — SODIUM HYPOCHLORITE 1.25 MG/ML
1 SOLUTION TOPICAL AS NEEDED
Status: CANCELLED | OUTPATIENT
Start: 2021-09-29

## 2021-09-29 RX ORDER — BALSAM PERU/CASTOR OIL
1 OINTMENT (GRAM) TOPICAL AS NEEDED
Status: CANCELLED | OUTPATIENT
Start: 2021-10-13

## 2021-09-29 RX ADMIN — HYOSCYAMINE SULFATE 473 ML: 16 SOLUTION at 15:45

## 2021-09-29 RX ADMIN — Medication 1 APPLICATION: at 15:45

## 2021-10-02 LAB
BACTERIA SPEC AEROBE CULT: ABNORMAL
GRAM STN SPEC: ABNORMAL

## 2021-10-13 ENCOUNTER — HOSPITAL ENCOUNTER (OUTPATIENT)
Dept: WOUND CARE | Facility: HOSPITAL | Age: 46
End: 2021-10-13

## 2021-10-20 ENCOUNTER — HOSPITAL ENCOUNTER (OUTPATIENT)
Dept: WOUND CARE | Facility: HOSPITAL | Age: 46
Discharge: HOME | End: 2021-10-20
Admitting: NURSE PRACTITIONER

## 2021-10-20 VITALS
RESPIRATION RATE: 20 BRPM | DIASTOLIC BLOOD PRESSURE: 72 MMHG | SYSTOLIC BLOOD PRESSURE: 121 MMHG | HEART RATE: 72 BPM | TEMPERATURE: 98.7 F

## 2021-10-20 DIAGNOSIS — L89.314 PRESSURE INJURY OF RIGHT BUTTOCK, STAGE 4 (HCC): Primary | ICD-10-CM

## 2021-10-20 DIAGNOSIS — L89.324 PRESSURE INJURY OF LEFT BUTTOCK, STAGE 4 (HCC): ICD-10-CM

## 2021-10-20 DIAGNOSIS — L89.002 PRESSURE INJURY OF ELBOW, STAGE 2, UNSPECIFIED LATERALITY (HCC): ICD-10-CM

## 2021-10-20 PROCEDURE — 63710000001 SODIUM HYPOCHLORITE 0.25 % SOLUTION 473 ML BOTTLE: Performed by: NURSE PRACTITIONER

## 2021-10-20 PROCEDURE — A9270 NON-COVERED ITEM OR SERVICE: HCPCS | Performed by: NURSE PRACTITIONER

## 2021-10-20 PROCEDURE — 63710000001 MAGIC BARRIER CREAM 60 G BOTTLE: Performed by: NURSE PRACTITIONER

## 2021-10-20 RX ORDER — CASTOR OIL AND BALSAM, PERU 788; 87 MG/G; MG/G
1 OINTMENT TOPICAL AS NEEDED
Status: CANCELLED | OUTPATIENT
Start: 2021-10-20

## 2021-10-20 RX ORDER — SODIUM HYPOCHLORITE 2.5 MG/ML
1 SOLUTION TOPICAL AS NEEDED
Status: DISCONTINUED | OUTPATIENT
Start: 2021-10-20 | End: 2021-10-21 | Stop reason: HOSPADM

## 2021-10-20 RX ORDER — SODIUM HYPOCHLORITE 2.5 MG/ML
1 SOLUTION TOPICAL AS NEEDED
Status: CANCELLED | OUTPATIENT
Start: 2021-11-03

## 2021-10-20 RX ORDER — LIDOCAINE HYDROCHLORIDE 20 MG/ML
JELLY TOPICAL AS NEEDED
Status: CANCELLED | OUTPATIENT
Start: 2021-11-03

## 2021-10-20 RX ORDER — SODIUM HYPOCHLORITE 1.25 MG/ML
1 SOLUTION TOPICAL AS NEEDED
Status: CANCELLED | OUTPATIENT
Start: 2021-11-03

## 2021-10-20 RX ORDER — LIDOCAINE HYDROCHLORIDE 20 MG/ML
JELLY TOPICAL AS NEEDED
Status: CANCELLED | OUTPATIENT
Start: 2021-10-20

## 2021-10-20 RX ORDER — SODIUM HYPOCHLORITE 1.25 MG/ML
1 SOLUTION TOPICAL AS NEEDED
Status: CANCELLED | OUTPATIENT
Start: 2021-10-20

## 2021-10-20 RX ORDER — CASTOR OIL AND BALSAM, PERU 788; 87 MG/G; MG/G
1 OINTMENT TOPICAL AS NEEDED
Status: CANCELLED | OUTPATIENT
Start: 2021-11-03

## 2021-10-20 RX ADMIN — Medication 1 APPLICATION: at 15:51

## 2021-10-20 RX ADMIN — SODIUM HYPOCHLORITE 473 ML: 2.5 SOLUTION TOPICAL at 15:51

## 2021-10-29 ENCOUNTER — APPOINTMENT (OUTPATIENT)
Dept: CT IMAGING | Facility: HOSPITAL | Age: 46
End: 2021-10-29

## 2021-10-29 ENCOUNTER — HOSPITAL ENCOUNTER (EMERGENCY)
Facility: HOSPITAL | Age: 46
Discharge: HOME | End: 2021-10-29
Attending: STUDENT IN AN ORGANIZED HEALTH CARE EDUCATION/TRAINING PROGRAM | Admitting: STUDENT IN AN ORGANIZED HEALTH CARE EDUCATION/TRAINING PROGRAM

## 2021-10-29 VITALS
HEART RATE: 83 BPM | DIASTOLIC BLOOD PRESSURE: 62 MMHG | SYSTOLIC BLOOD PRESSURE: 109 MMHG | TEMPERATURE: 97.7 F | HEIGHT: 71 IN | WEIGHT: 300 LBS | BODY MASS INDEX: 42 KG/M2 | OXYGEN SATURATION: 96 % | RESPIRATION RATE: 18 BRPM

## 2021-10-29 DIAGNOSIS — N30.90 CYSTITIS: Primary | ICD-10-CM

## 2021-10-29 LAB
ALBUMIN SERPL-MCNC: 3.32 G/DL (ref 3.5–5.2)
ALBUMIN/GLOB SERPL: 0.8 G/DL
ALP SERPL-CCNC: 125 U/L (ref 39–117)
ALT SERPL W P-5'-P-CCNC: 20 U/L (ref 1–41)
ANION GAP SERPL CALCULATED.3IONS-SCNC: 11.8 MMOL/L (ref 5–15)
ANISOCYTOSIS BLD QL: NORMAL
AST SERPL-CCNC: 14 U/L (ref 1–40)
BACTERIA UR QL AUTO: ABNORMAL /HPF
BASOPHILS # BLD AUTO: 0.04 10*3/MM3 (ref 0–0.2)
BASOPHILS NFR BLD AUTO: 0.4 % (ref 0–1.5)
BILIRUB SERPL-MCNC: 0.2 MG/DL (ref 0–1.2)
BILIRUB UR QL STRIP: NEGATIVE
BUN SERPL-MCNC: 9 MG/DL (ref 6–20)
BUN/CREAT SERPL: 12.2 (ref 7–25)
CALCIUM SPEC-SCNC: 9.2 MG/DL (ref 8.6–10.5)
CHLORIDE SERPL-SCNC: 97 MMOL/L (ref 98–107)
CLARITY UR: ABNORMAL
CO2 SERPL-SCNC: 23.2 MMOL/L (ref 22–29)
COLOR UR: YELLOW
CREAT SERPL-MCNC: 0.74 MG/DL (ref 0.76–1.27)
CRP SERPL-MCNC: 8.29 MG/DL (ref 0–0.5)
D-LACTATE SERPL-SCNC: 3.2 MMOL/L (ref 0.5–2)
D-LACTATE SERPL-SCNC: 3.3 MMOL/L (ref 0.5–2)
DEPRECATED RDW RBC AUTO: 51.2 FL (ref 37–54)
EOSINOPHIL # BLD AUTO: 0.13 10*3/MM3 (ref 0–0.4)
EOSINOPHIL NFR BLD AUTO: 1.4 % (ref 0.3–6.2)
ERYTHROCYTE [DISTWIDTH] IN BLOOD BY AUTOMATED COUNT: 19.1 % (ref 12.3–15.4)
ERYTHROCYTE [SEDIMENTATION RATE] IN BLOOD: 106 MM/HR (ref 0–15)
GFR SERPL CREATININE-BSD FRML MDRD: 115 ML/MIN/1.73
GLOBULIN UR ELPH-MCNC: 4 GM/DL
GLUCOSE BLDC GLUCOMTR-MCNC: 516 MG/DL (ref 70–130)
GLUCOSE BLDC GLUCOMTR-MCNC: 548 MG/DL (ref 70–130)
GLUCOSE SERPL-MCNC: 656 MG/DL (ref 65–99)
GLUCOSE UR STRIP-MCNC: NEGATIVE MG/DL
HCT VFR BLD AUTO: 39.1 % (ref 37.5–51)
HGB BLD-MCNC: 10.4 G/DL (ref 13–17.7)
HGB UR QL STRIP.AUTO: ABNORMAL
HYALINE CASTS UR QL AUTO: ABNORMAL /LPF
HYPOCHROMIA BLD QL: NORMAL
IMM GRANULOCYTES # BLD AUTO: 0.03 10*3/MM3 (ref 0–0.05)
IMM GRANULOCYTES NFR BLD AUTO: 0.3 % (ref 0–0.5)
INR PPP: 1 (ref 0.9–1.1)
KETONES UR QL STRIP: NEGATIVE
LEUKOCYTE ESTERASE UR QL STRIP.AUTO: NEGATIVE
LYMPHOCYTES # BLD AUTO: 2.13 10*3/MM3 (ref 0.7–3.1)
LYMPHOCYTES NFR BLD AUTO: 22.6 % (ref 19.6–45.3)
MCH RBC QN AUTO: 20 PG (ref 26.6–33)
MCHC RBC AUTO-ENTMCNC: 26.6 G/DL (ref 31.5–35.7)
MCV RBC AUTO: 75.2 FL (ref 79–97)
MICROCYTES BLD QL: NORMAL
MONOCYTES # BLD AUTO: 0.41 10*3/MM3 (ref 0.1–0.9)
MONOCYTES NFR BLD AUTO: 4.3 % (ref 5–12)
NEUTROPHILS NFR BLD AUTO: 6.7 10*3/MM3 (ref 1.7–7)
NEUTROPHILS NFR BLD AUTO: 71 % (ref 42.7–76)
NITRITE UR QL STRIP: NEGATIVE
NRBC BLD AUTO-RTO: 0 /100 WBC (ref 0–0.2)
PH UR STRIP.AUTO: 6.5 [PH] (ref 5–8)
PLAT MORPH BLD: NORMAL
PLATELET # BLD AUTO: 457 10*3/MM3 (ref 140–450)
PMV BLD AUTO: 9.6 FL (ref 6–12)
POTASSIUM SERPL-SCNC: 4.4 MMOL/L (ref 3.5–5.2)
PROT SERPL-MCNC: 7.3 G/DL (ref 6–8.5)
PROT UR QL STRIP: NEGATIVE
PROTHROMBIN TIME: 13.6 SECONDS (ref 12.8–14.5)
RBC # BLD AUTO: 5.2 10*6/MM3 (ref 4.14–5.8)
RBC # UR: ABNORMAL /HPF
REF LAB TEST METHOD: ABNORMAL
SODIUM SERPL-SCNC: 132 MMOL/L (ref 136–145)
SP GR UR STRIP: 1.02 (ref 1–1.03)
SQUAMOUS #/AREA URNS HPF: ABNORMAL /HPF
UROBILINOGEN UR QL STRIP: ABNORMAL
WBC # BLD AUTO: 9.44 10*3/MM3 (ref 3.4–10.8)
WBC UR QL AUTO: ABNORMAL /HPF
YEAST URNS QL MICRO: ABNORMAL /HPF

## 2021-10-29 PROCEDURE — 63710000001 INSULIN REGULAR HUMAN PER 5 UNITS: Performed by: STUDENT IN AN ORGANIZED HEALTH CARE EDUCATION/TRAINING PROGRAM

## 2021-10-29 PROCEDURE — 74177 CT ABD & PELVIS W/CONTRAST: CPT | Performed by: RADIOLOGY

## 2021-10-29 PROCEDURE — 25010000002 MEROPENEM PER 100 MG: Performed by: STUDENT IN AN ORGANIZED HEALTH CARE EDUCATION/TRAINING PROGRAM

## 2021-10-29 PROCEDURE — 99283 EMERGENCY DEPT VISIT LOW MDM: CPT

## 2021-10-29 PROCEDURE — 87040 BLOOD CULTURE FOR BACTERIA: CPT | Performed by: STUDENT IN AN ORGANIZED HEALTH CARE EDUCATION/TRAINING PROGRAM

## 2021-10-29 PROCEDURE — 83605 ASSAY OF LACTIC ACID: CPT | Performed by: STUDENT IN AN ORGANIZED HEALTH CARE EDUCATION/TRAINING PROGRAM

## 2021-10-29 PROCEDURE — P9612 CATHETERIZE FOR URINE SPEC: HCPCS

## 2021-10-29 PROCEDURE — 96365 THER/PROPH/DIAG IV INF INIT: CPT

## 2021-10-29 PROCEDURE — 85610 PROTHROMBIN TIME: CPT | Performed by: STUDENT IN AN ORGANIZED HEALTH CARE EDUCATION/TRAINING PROGRAM

## 2021-10-29 PROCEDURE — 81001 URINALYSIS AUTO W/SCOPE: CPT | Performed by: STUDENT IN AN ORGANIZED HEALTH CARE EDUCATION/TRAINING PROGRAM

## 2021-10-29 PROCEDURE — 25010000002 IOPAMIDOL 61 % SOLUTION: Performed by: STUDENT IN AN ORGANIZED HEALTH CARE EDUCATION/TRAINING PROGRAM

## 2021-10-29 PROCEDURE — 74177 CT ABD & PELVIS W/CONTRAST: CPT

## 2021-10-29 PROCEDURE — 86140 C-REACTIVE PROTEIN: CPT | Performed by: STUDENT IN AN ORGANIZED HEALTH CARE EDUCATION/TRAINING PROGRAM

## 2021-10-29 PROCEDURE — 85652 RBC SED RATE AUTOMATED: CPT | Performed by: STUDENT IN AN ORGANIZED HEALTH CARE EDUCATION/TRAINING PROGRAM

## 2021-10-29 PROCEDURE — 85007 BL SMEAR W/DIFF WBC COUNT: CPT | Performed by: STUDENT IN AN ORGANIZED HEALTH CARE EDUCATION/TRAINING PROGRAM

## 2021-10-29 PROCEDURE — 85025 COMPLETE CBC W/AUTO DIFF WBC: CPT | Performed by: STUDENT IN AN ORGANIZED HEALTH CARE EDUCATION/TRAINING PROGRAM

## 2021-10-29 PROCEDURE — 82962 GLUCOSE BLOOD TEST: CPT

## 2021-10-29 PROCEDURE — 80053 COMPREHEN METABOLIC PANEL: CPT | Performed by: STUDENT IN AN ORGANIZED HEALTH CARE EDUCATION/TRAINING PROGRAM

## 2021-10-29 PROCEDURE — 99284 EMERGENCY DEPT VISIT MOD MDM: CPT

## 2021-10-29 RX ORDER — CEFUROXIME AXETIL 500 MG/1
500 TABLET ORAL 2 TIMES DAILY
Qty: 20 TABLET | Refills: 0 | Status: ON HOLD | OUTPATIENT
Start: 2021-10-29 | End: 2022-03-21

## 2021-10-29 RX ADMIN — HUMAN INSULIN 8 UNITS: 100 INJECTION, SOLUTION SUBCUTANEOUS at 18:34

## 2021-10-29 RX ADMIN — HUMAN INSULIN 5 UNITS: 100 INJECTION, SOLUTION SUBCUTANEOUS at 20:47

## 2021-10-29 RX ADMIN — IOPAMIDOL 90 ML: 612 INJECTION, SOLUTION INTRAVENOUS at 19:39

## 2021-10-29 RX ADMIN — MEROPENEM 1 G: 1 INJECTION, POWDER, FOR SOLUTION INTRAVENOUS at 20:47

## 2021-10-29 RX ADMIN — SODIUM CHLORIDE 1000 ML: 9 INJECTION, SOLUTION INTRAVENOUS at 21:46
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
## 2021-11-03 ENCOUNTER — HOSPITAL ENCOUNTER (OUTPATIENT)
Dept: WOUND CARE | Facility: HOSPITAL | Age: 46
End: 2021-11-03

## 2021-11-03 LAB
BACTERIA SPEC AEROBE CULT: NORMAL
BACTERIA SPEC AEROBE CULT: NORMAL

## 2021-11-10 ENCOUNTER — HOSPITAL ENCOUNTER (OUTPATIENT)
Dept: WOUND CARE | Facility: HOSPITAL | Age: 46
Discharge: HOME | End: 2021-11-10
Admitting: NURSE PRACTITIONER

## 2021-11-10 DIAGNOSIS — L89.002 PRESSURE INJURY OF ELBOW, STAGE 2, UNSPECIFIED LATERALITY (HCC): Primary | ICD-10-CM

## 2021-11-10 PROCEDURE — 97602 WOUND(S) CARE NON-SELECTIVE: CPT

## 2021-11-10 PROCEDURE — A9270 NON-COVERED ITEM OR SERVICE: HCPCS | Performed by: NURSE PRACTITIONER

## 2021-11-10 PROCEDURE — 63710000001 MAGIC BARRIER CREAM 60 G BOTTLE: Performed by: NURSE PRACTITIONER

## 2021-11-10 RX ORDER — LIDOCAINE HYDROCHLORIDE 20 MG/ML
JELLY TOPICAL AS NEEDED
Status: CANCELLED | OUTPATIENT
Start: 2021-12-01

## 2021-11-10 RX ORDER — SODIUM HYPOCHLORITE 2.5 MG/ML
1 SOLUTION TOPICAL AS NEEDED
Status: CANCELLED | OUTPATIENT
Start: 2021-12-01

## 2021-11-10 RX ORDER — SODIUM HYPOCHLORITE 1.25 MG/ML
1 SOLUTION TOPICAL AS NEEDED
Status: CANCELLED | OUTPATIENT
Start: 2021-11-10

## 2021-11-10 RX ORDER — CASTOR OIL AND BALSAM, PERU 788; 87 MG/G; MG/G
1 OINTMENT TOPICAL AS NEEDED
Status: CANCELLED | OUTPATIENT
Start: 2021-12-01

## 2021-11-10 RX ADMIN — Medication 1 APPLICATION: at 15:20
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
## 2021-12-01 ENCOUNTER — HOSPITAL ENCOUNTER (OUTPATIENT)
Dept: WOUND CARE | Facility: HOSPITAL | Age: 46
Discharge: HOME | End: 2021-12-01
Admitting: NURSE PRACTITIONER

## 2021-12-01 VITALS
SYSTOLIC BLOOD PRESSURE: 110 MMHG | RESPIRATION RATE: 20 BRPM | DIASTOLIC BLOOD PRESSURE: 78 MMHG | TEMPERATURE: 98.3 F | HEART RATE: 85 BPM

## 2021-12-01 DIAGNOSIS — L89.324 PRESSURE INJURY OF LEFT BUTTOCK, STAGE 4 (HCC): ICD-10-CM

## 2021-12-01 DIAGNOSIS — L89.153 PRESSURE INJURY OF SACRAL REGION, STAGE 3 (HCC): ICD-10-CM

## 2021-12-01 DIAGNOSIS — L89.002 PRESSURE INJURY OF ELBOW, STAGE 2, UNSPECIFIED LATERALITY (HCC): ICD-10-CM

## 2021-12-01 DIAGNOSIS — L89.314 PRESSURE INJURY OF RIGHT BUTTOCK, STAGE 4 (HCC): Primary | ICD-10-CM

## 2021-12-01 PROCEDURE — A9270 NON-COVERED ITEM OR SERVICE: HCPCS | Performed by: NURSE PRACTITIONER

## 2021-12-01 PROCEDURE — 63710000001 MAGIC BARRIER CREAM 60 G BOTTLE: Performed by: NURSE PRACTITIONER

## 2021-12-01 PROCEDURE — 97602 WOUND(S) CARE NON-SELECTIVE: CPT

## 2021-12-01 RX ORDER — LIDOCAINE HYDROCHLORIDE 20 MG/ML
JELLY TOPICAL AS NEEDED
Status: CANCELLED | OUTPATIENT
Start: 2021-12-15

## 2021-12-01 RX ORDER — SODIUM HYPOCHLORITE 2.5 MG/ML
1 SOLUTION TOPICAL AS NEEDED
Status: CANCELLED | OUTPATIENT
Start: 2021-12-15

## 2021-12-01 RX ORDER — SODIUM HYPOCHLORITE 1.25 MG/ML
1 SOLUTION TOPICAL AS NEEDED
Status: CANCELLED | OUTPATIENT
Start: 2021-12-01

## 2021-12-01 RX ORDER — CASTOR OIL AND BALSAM, PERU 788; 87 MG/G; MG/G
1 OINTMENT TOPICAL AS NEEDED
Status: CANCELLED | OUTPATIENT
Start: 2021-12-15

## 2021-12-01 RX ADMIN — Medication 1 APPLICATION: at 14:51
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
## 2021-12-15 ENCOUNTER — HOSPITAL ENCOUNTER (OUTPATIENT)
Dept: WOUND CARE | Facility: HOSPITAL | Age: 46
Discharge: HOME | End: 2021-12-15
Admitting: NURSE PRACTITIONER

## 2021-12-15 DIAGNOSIS — L89.153 PRESSURE INJURY OF SACRAL REGION, STAGE 3 (HCC): ICD-10-CM

## 2021-12-15 DIAGNOSIS — L89.314 PRESSURE INJURY OF RIGHT BUTTOCK, STAGE 4 (HCC): Primary | ICD-10-CM

## 2021-12-15 DIAGNOSIS — L89.324 PRESSURE INJURY OF LEFT BUTTOCK, STAGE 4 (HCC): ICD-10-CM

## 2021-12-15 DIAGNOSIS — L89.002 PRESSURE INJURY OF ELBOW, STAGE 2, UNSPECIFIED LATERALITY (HCC): ICD-10-CM

## 2021-12-15 PROCEDURE — 97602 WOUND(S) CARE NON-SELECTIVE: CPT

## 2021-12-15 PROCEDURE — 63710000001 MAGIC BARRIER CREAM 60 G BOTTLE: Performed by: NURSE PRACTITIONER

## 2021-12-15 PROCEDURE — A9270 NON-COVERED ITEM OR SERVICE: HCPCS | Performed by: NURSE PRACTITIONER

## 2021-12-15 RX ORDER — SODIUM HYPOCHLORITE 2.5 MG/ML
1 SOLUTION TOPICAL AS NEEDED
Status: CANCELLED | OUTPATIENT
Start: 2021-12-29

## 2021-12-15 RX ORDER — SODIUM HYPOCHLORITE 1.25 MG/ML
1 SOLUTION TOPICAL AS NEEDED
Status: CANCELLED | OUTPATIENT
Start: 2022-02-02

## 2021-12-15 RX ORDER — LIDOCAINE HYDROCHLORIDE 20 MG/ML
JELLY TOPICAL AS NEEDED
Status: CANCELLED | OUTPATIENT
Start: 2021-12-29

## 2021-12-15 RX ORDER — CASTOR OIL AND BALSAM, PERU 788; 87 MG/G; MG/G
1 OINTMENT TOPICAL AS NEEDED
Status: CANCELLED | OUTPATIENT
Start: 2021-12-29

## 2021-12-15 RX ADMIN — Medication 1 APPLICATION: at 15:24
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
## 2021-12-29 ENCOUNTER — HOSPITAL ENCOUNTER (OUTPATIENT)
Dept: WOUND CARE | Facility: HOSPITAL | Age: 46
Discharge: HOME | End: 2021-12-29

## 2022-01-05 ENCOUNTER — HOSPITAL ENCOUNTER (OUTPATIENT)
Dept: WOUND CARE | Facility: HOSPITAL | Age: 47
Discharge: HOME | End: 2022-01-05
Admitting: NURSE PRACTITIONER

## 2022-01-05 VITALS
HEART RATE: 86 BPM | DIASTOLIC BLOOD PRESSURE: 72 MMHG | SYSTOLIC BLOOD PRESSURE: 111 MMHG | TEMPERATURE: 98.3 F | RESPIRATION RATE: 20 BRPM

## 2022-01-05 DIAGNOSIS — L89.002 PRESSURE INJURY OF ELBOW, STAGE 2, UNSPECIFIED LATERALITY: Primary | ICD-10-CM

## 2022-01-05 PROCEDURE — 97602 WOUND(S) CARE NON-SELECTIVE: CPT

## 2022-01-05 PROCEDURE — A9270 NON-COVERED ITEM OR SERVICE: HCPCS | Performed by: NURSE PRACTITIONER

## 2022-01-05 PROCEDURE — 63710000001 MAGIC BARRIER CREAM 60 G BOTTLE: Performed by: NURSE PRACTITIONER

## 2022-01-05 RX ORDER — CASTOR OIL AND BALSAM, PERU 788; 87 MG/G; MG/G
1 OINTMENT TOPICAL AS NEEDED
Status: CANCELLED | OUTPATIENT
Start: 2022-02-02

## 2022-01-05 RX ORDER — LIDOCAINE HYDROCHLORIDE 20 MG/ML
JELLY TOPICAL AS NEEDED
Status: CANCELLED | OUTPATIENT
Start: 2022-02-02

## 2022-01-05 RX ORDER — SODIUM HYPOCHLORITE 1.25 MG/ML
1 SOLUTION TOPICAL AS NEEDED
Status: CANCELLED | OUTPATIENT
Start: 2022-02-02

## 2022-01-05 RX ORDER — SODIUM HYPOCHLORITE 2.5 MG/ML
1 SOLUTION TOPICAL AS NEEDED
Status: CANCELLED | OUTPATIENT
Start: 2022-02-02

## 2022-01-05 RX ADMIN — Medication 1 APPLICATION: at 11:13
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
## 2022-02-02 ENCOUNTER — HOSPITAL ENCOUNTER (OUTPATIENT)
Dept: WOUND CARE | Facility: HOSPITAL | Age: 47
End: 2022-02-02

## 2022-02-09 ENCOUNTER — HOSPITAL ENCOUNTER (OUTPATIENT)
Dept: WOUND CARE | Facility: HOSPITAL | Age: 47
Discharge: HOME | End: 2022-02-09
Admitting: NURSE PRACTITIONER

## 2022-02-09 DIAGNOSIS — L89.324 PRESSURE INJURY OF LEFT BUTTOCK, STAGE 4: ICD-10-CM

## 2022-02-09 DIAGNOSIS — L89.314 PRESSURE INJURY OF RIGHT BUTTOCK, STAGE 4: Primary | ICD-10-CM

## 2022-02-09 DIAGNOSIS — L89.611 PRESSURE INJURY OF RIGHT HEEL, STAGE 1: ICD-10-CM

## 2022-02-09 PROCEDURE — 63710000001 MAGIC BARRIER CREAM 60 G BOTTLE: Performed by: NURSE PRACTITIONER

## 2022-02-09 PROCEDURE — A9270 NON-COVERED ITEM OR SERVICE: HCPCS | Performed by: NURSE PRACTITIONER

## 2022-02-09 PROCEDURE — 97602 WOUND(S) CARE NON-SELECTIVE: CPT

## 2022-02-09 PROCEDURE — 63710000001 SODIUM HYPOCHLORITE 0.25 % SOLUTION 473 ML BOTTLE: Performed by: NURSE PRACTITIONER

## 2022-02-09 RX ORDER — SODIUM HYPOCHLORITE 2.5 MG/ML
SOLUTION TOPICAL ONCE
Status: COMPLETED | OUTPATIENT
Start: 2022-02-09 | End: 2022-02-09

## 2022-02-09 RX ADMIN — Medication 1 APPLICATION: at 11:45

## 2022-02-09 RX ADMIN — HYOSCYAMINE SULFATE: 16 SOLUTION at 11:45
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
## 2022-03-09 ENCOUNTER — HOSPITAL ENCOUNTER (OUTPATIENT)
Dept: WOUND CARE | Facility: HOSPITAL | Age: 47
Discharge: HOME HEALTH | End: 2022-03-09
Admitting: NURSE PRACTITIONER

## 2022-03-09 VITALS
DIASTOLIC BLOOD PRESSURE: 73 MMHG | HEART RATE: 86 BPM | TEMPERATURE: 98.3 F | RESPIRATION RATE: 20 BRPM | SYSTOLIC BLOOD PRESSURE: 115 MMHG

## 2022-03-09 DIAGNOSIS — L89.611 PRESSURE INJURY OF RIGHT HEEL, STAGE 1: ICD-10-CM

## 2022-03-09 DIAGNOSIS — L89.314 PRESSURE INJURY OF RIGHT BUTTOCK, STAGE 4: Primary | ICD-10-CM

## 2022-03-09 DIAGNOSIS — L89.324 PRESSURE INJURY OF LEFT BUTTOCK, STAGE 4: ICD-10-CM

## 2022-03-09 PROCEDURE — 97602 WOUND(S) CARE NON-SELECTIVE: CPT
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
## 2022-03-14 ENCOUNTER — HOSPITAL ENCOUNTER (EMERGENCY)
Facility: HOSPITAL | Age: 47
Discharge: HOME | End: 2022-03-14
Attending: EMERGENCY MEDICINE | Admitting: EMERGENCY MEDICINE

## 2022-03-14 VITALS
HEART RATE: 102 BPM | BODY MASS INDEX: 44.1 KG/M2 | DIASTOLIC BLOOD PRESSURE: 80 MMHG | WEIGHT: 315 LBS | RESPIRATION RATE: 18 BRPM | TEMPERATURE: 98.2 F | HEIGHT: 71 IN | OXYGEN SATURATION: 95 % | SYSTOLIC BLOOD PRESSURE: 122 MMHG

## 2022-03-14 DIAGNOSIS — D50.8 IRON DEFICIENCY ANEMIA SECONDARY TO INADEQUATE DIETARY IRON INTAKE: Primary | ICD-10-CM

## 2022-03-14 DIAGNOSIS — G82.20 PARAPLEGIA: ICD-10-CM

## 2022-03-14 LAB
ABO GROUP BLD: NORMAL
ALBUMIN SERPL-MCNC: 3.42 G/DL (ref 3.5–5.2)
ALBUMIN/GLOB SERPL: 0.8 G/DL
ALP SERPL-CCNC: 105 U/L (ref 39–117)
ALT SERPL W P-5'-P-CCNC: 19 U/L (ref 1–41)
ANION GAP SERPL CALCULATED.3IONS-SCNC: 13.6 MMOL/L (ref 5–15)
ANISOCYTOSIS BLD QL: NORMAL
AST SERPL-CCNC: 18 U/L (ref 1–40)
BASOPHILS # BLD AUTO: 0.03 10*3/MM3 (ref 0–0.2)
BASOPHILS NFR BLD AUTO: 0.4 % (ref 0–1.5)
BILIRUB SERPL-MCNC: 0.2 MG/DL (ref 0–1.2)
BLD GP AB SCN SERPL QL: NEGATIVE
BUN SERPL-MCNC: 11 MG/DL (ref 6–20)
BUN/CREAT SERPL: 17.2 (ref 7–25)
CALCIUM SPEC-SCNC: 8.5 MG/DL (ref 8.6–10.5)
CHLORIDE SERPL-SCNC: 102 MMOL/L (ref 98–107)
CO2 SERPL-SCNC: 24.4 MMOL/L (ref 22–29)
CREAT SERPL-MCNC: 0.64 MG/DL (ref 0.76–1.27)
DEPRECATED RDW RBC AUTO: 49 FL (ref 37–54)
EGFRCR SERPLBLD CKD-EPI 2021: 119.7 ML/MIN/1.73
EOSINOPHIL # BLD AUTO: 0.14 10*3/MM3 (ref 0–0.4)
EOSINOPHIL NFR BLD AUTO: 1.6 % (ref 0.3–6.2)
ERYTHROCYTE [DISTWIDTH] IN BLOOD BY AUTOMATED COUNT: 19.3 % (ref 12.3–15.4)
GLOBULIN UR ELPH-MCNC: 4.4 GM/DL
GLUCOSE SERPL-MCNC: 242 MG/DL (ref 65–99)
HCT VFR BLD AUTO: 31.3 % (ref 37.5–51)
HGB BLD-MCNC: 8.2 G/DL (ref 13–17.7)
HYPOCHROMIA BLD QL: NORMAL
IMM GRANULOCYTES # BLD AUTO: 0.03 10*3/MM3 (ref 0–0.05)
IMM GRANULOCYTES NFR BLD AUTO: 0.4 % (ref 0–0.5)
LYMPHOCYTES # BLD AUTO: 1.46 10*3/MM3 (ref 0.7–3.1)
LYMPHOCYTES NFR BLD AUTO: 17.1 % (ref 19.6–45.3)
MCH RBC QN AUTO: 18.8 PG (ref 26.6–33)
MCHC RBC AUTO-ENTMCNC: 26.2 G/DL (ref 31.5–35.7)
MCV RBC AUTO: 71.8 FL (ref 79–97)
MICROCYTES BLD QL: NORMAL
MONOCYTES # BLD AUTO: 0.32 10*3/MM3 (ref 0.1–0.9)
MONOCYTES NFR BLD AUTO: 3.8 % (ref 5–12)
NEUTROPHILS NFR BLD AUTO: 6.55 10*3/MM3 (ref 1.7–7)
NEUTROPHILS NFR BLD AUTO: 76.7 % (ref 42.7–76)
NRBC BLD AUTO-RTO: 0 /100 WBC (ref 0–0.2)
PLAT MORPH BLD: NORMAL
PLATELET # BLD AUTO: 415 10*3/MM3 (ref 140–450)
PMV BLD AUTO: 9.2 FL (ref 6–12)
POTASSIUM SERPL-SCNC: 3.7 MMOL/L (ref 3.5–5.2)
PROT SERPL-MCNC: 7.8 G/DL (ref 6–8.5)
RBC # BLD AUTO: 4.36 10*6/MM3 (ref 4.14–5.8)
RH BLD: POSITIVE
SODIUM SERPL-SCNC: 140 MMOL/L (ref 136–145)
T&S EXPIRATION DATE: NORMAL
WBC NRBC COR # BLD: 8.53 10*3/MM3 (ref 3.4–10.8)

## 2022-03-14 PROCEDURE — 85007 BL SMEAR W/DIFF WBC COUNT: CPT | Performed by: EMERGENCY MEDICINE

## 2022-03-14 PROCEDURE — 99283 EMERGENCY DEPT VISIT LOW MDM: CPT

## 2022-03-14 PROCEDURE — 86850 RBC ANTIBODY SCREEN: CPT | Performed by: EMERGENCY MEDICINE

## 2022-03-14 PROCEDURE — 85025 COMPLETE CBC W/AUTO DIFF WBC: CPT | Performed by: EMERGENCY MEDICINE

## 2022-03-14 PROCEDURE — 86901 BLOOD TYPING SEROLOGIC RH(D): CPT | Performed by: EMERGENCY MEDICINE

## 2022-03-14 PROCEDURE — 80053 COMPREHEN METABOLIC PANEL: CPT | Performed by: EMERGENCY MEDICINE

## 2022-03-14 PROCEDURE — 86900 BLOOD TYPING SEROLOGIC ABO: CPT | Performed by: EMERGENCY MEDICINE

## 2022-03-14 RX ORDER — HYDROCODONE BITARTRATE AND ACETAMINOPHEN 10; 325 MG/1; MG/1
1 TABLET ORAL ONCE
Status: COMPLETED | OUTPATIENT
Start: 2022-03-14 | End: 2022-03-14

## 2022-03-14 RX ORDER — DOXYCYCLINE HYCLATE 50 MG/1
324 CAPSULE, GELATIN COATED ORAL
Qty: 15 TABLET | Refills: 0 | Status: ON HOLD | OUTPATIENT
Start: 2022-03-14 | End: 2022-03-21

## 2022-03-14 RX ADMIN — SODIUM CHLORIDE 1000 ML: 9 INJECTION, SOLUTION INTRAVENOUS at 19:37

## 2022-03-14 RX ADMIN — HYDROCODONE BITARTRATE AND ACETAMINOPHEN 1 TABLET: 10; 325 TABLET ORAL at 21:10
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
## 2022-03-16 PROCEDURE — 99283 EMERGENCY DEPT VISIT LOW MDM: CPT

## 2022-03-17 ENCOUNTER — APPOINTMENT (OUTPATIENT)
Dept: CT IMAGING | Facility: HOSPITAL | Age: 47
End: 2022-03-17

## 2022-03-17 ENCOUNTER — HOSPITAL ENCOUNTER (EMERGENCY)
Facility: HOSPITAL | Age: 47
Discharge: HOME | End: 2022-03-17
Attending: EMERGENCY MEDICINE | Admitting: EMERGENCY MEDICINE

## 2022-03-17 VITALS
BODY MASS INDEX: 44.1 KG/M2 | SYSTOLIC BLOOD PRESSURE: 128 MMHG | TEMPERATURE: 97.7 F | RESPIRATION RATE: 20 BRPM | WEIGHT: 315 LBS | HEART RATE: 92 BPM | OXYGEN SATURATION: 98 % | DIASTOLIC BLOOD PRESSURE: 94 MMHG | HEIGHT: 71 IN

## 2022-03-17 DIAGNOSIS — M62.838 MUSCLE SPASM: Primary | ICD-10-CM

## 2022-03-17 LAB
ALBUMIN SERPL-MCNC: 3.27 G/DL (ref 3.5–5.2)
ALBUMIN/GLOB SERPL: 0.7 G/DL
ALP SERPL-CCNC: 108 U/L (ref 39–117)
ALT SERPL W P-5'-P-CCNC: 17 U/L (ref 1–41)
AMORPH URATE CRY URNS QL MICRO: ABNORMAL /HPF
ANION GAP SERPL CALCULATED.3IONS-SCNC: 10 MMOL/L (ref 5–15)
ANISOCYTOSIS BLD QL: NORMAL
AST SERPL-CCNC: 18 U/L (ref 1–40)
BACTERIA UR QL AUTO: ABNORMAL /HPF
BASOPHILS # BLD AUTO: 0.03 10*3/MM3 (ref 0–0.2)
BASOPHILS NFR BLD AUTO: 0.3 % (ref 0–1.5)
BILIRUB SERPL-MCNC: 0.3 MG/DL (ref 0–1.2)
BILIRUB UR QL STRIP: NEGATIVE
BUN SERPL-MCNC: 10 MG/DL (ref 6–20)
BUN/CREAT SERPL: 15.4 (ref 7–25)
CALCIUM SPEC-SCNC: 8.9 MG/DL (ref 8.6–10.5)
CHLORIDE SERPL-SCNC: 100 MMOL/L (ref 98–107)
CLARITY UR: ABNORMAL
CO2 SERPL-SCNC: 25 MMOL/L (ref 22–29)
COLOR UR: YELLOW
CREAT SERPL-MCNC: 0.65 MG/DL (ref 0.76–1.27)
D-LACTATE SERPL-SCNC: 1.9 MMOL/L (ref 0.5–2)
DEPRECATED RDW RBC AUTO: 49.5 FL (ref 37–54)
EGFRCR SERPLBLD CKD-EPI 2021: 119.2 ML/MIN/1.73
EOSINOPHIL # BLD AUTO: 0.11 10*3/MM3 (ref 0–0.4)
EOSINOPHIL NFR BLD AUTO: 1.2 % (ref 0.3–6.2)
ERYTHROCYTE [DISTWIDTH] IN BLOOD BY AUTOMATED COUNT: 19.3 % (ref 12.3–15.4)
GLOBULIN UR ELPH-MCNC: 4.7 GM/DL
GLUCOSE SERPL-MCNC: 213 MG/DL (ref 65–99)
GLUCOSE UR STRIP-MCNC: NEGATIVE MG/DL
HCT VFR BLD AUTO: 32.2 % (ref 37.5–51)
HGB BLD-MCNC: 8.4 G/DL (ref 13–17.7)
HGB UR QL STRIP.AUTO: ABNORMAL
HOLD SPECIMEN: NORMAL
HOLD SPECIMEN: NORMAL
HYALINE CASTS UR QL AUTO: ABNORMAL /LPF
HYPOCHROMIA BLD QL: NORMAL
IMM GRANULOCYTES # BLD AUTO: 0.02 10*3/MM3 (ref 0–0.05)
IMM GRANULOCYTES NFR BLD AUTO: 0.2 % (ref 0–0.5)
KETONES UR QL STRIP: NEGATIVE
LARGE PLATELETS: NORMAL
LEUKOCYTE ESTERASE UR QL STRIP.AUTO: ABNORMAL
LIPASE SERPL-CCNC: 39 U/L (ref 13–60)
LYMPHOCYTES # BLD AUTO: 1.83 10*3/MM3 (ref 0.7–3.1)
LYMPHOCYTES NFR BLD AUTO: 20 % (ref 19.6–45.3)
MAGNESIUM SERPL-MCNC: 1.9 MG/DL (ref 1.6–2.6)
MCH RBC QN AUTO: 18.9 PG (ref 26.6–33)
MCHC RBC AUTO-ENTMCNC: 26.1 G/DL (ref 31.5–35.7)
MCV RBC AUTO: 72.5 FL (ref 79–97)
MICROCYTES BLD QL: NORMAL
MONOCYTES # BLD AUTO: 0.43 10*3/MM3 (ref 0.1–0.9)
MONOCYTES NFR BLD AUTO: 4.7 % (ref 5–12)
NEUTROPHILS NFR BLD AUTO: 6.74 10*3/MM3 (ref 1.7–7)
NEUTROPHILS NFR BLD AUTO: 73.6 % (ref 42.7–76)
NITRITE UR QL STRIP: POSITIVE
NRBC BLD AUTO-RTO: 0 /100 WBC (ref 0–0.2)
PH UR STRIP.AUTO: 7.5 [PH] (ref 5–8)
PLATELET # BLD AUTO: 443 10*3/MM3 (ref 140–450)
PMV BLD AUTO: 9.3 FL (ref 6–12)
POTASSIUM SERPL-SCNC: 3.6 MMOL/L (ref 3.5–5.2)
PROT SERPL-MCNC: 8 G/DL (ref 6–8.5)
PROT UR QL STRIP: ABNORMAL
RBC # BLD AUTO: 4.44 10*6/MM3 (ref 4.14–5.8)
RBC # UR STRIP: ABNORMAL /HPF
REF LAB TEST METHOD: ABNORMAL
SODIUM SERPL-SCNC: 135 MMOL/L (ref 136–145)
SP GR UR STRIP: 1.02 (ref 1–1.03)
SQUAMOUS #/AREA URNS HPF: ABNORMAL /HPF
UROBILINOGEN UR QL STRIP: ABNORMAL
WBC # UR STRIP: ABNORMAL /HPF
WBC NRBC COR # BLD: 9.16 10*3/MM3 (ref 3.4–10.8)
WHOLE BLOOD HOLD SPECIMEN: NORMAL
WHOLE BLOOD HOLD SPECIMEN: NORMAL

## 2022-03-17 PROCEDURE — 87086 URINE CULTURE/COLONY COUNT: CPT | Performed by: EMERGENCY MEDICINE

## 2022-03-17 PROCEDURE — 83605 ASSAY OF LACTIC ACID: CPT | Performed by: EMERGENCY MEDICINE

## 2022-03-17 PROCEDURE — 36415 COLL VENOUS BLD VENIPUNCTURE: CPT

## 2022-03-17 PROCEDURE — 80053 COMPREHEN METABOLIC PANEL: CPT | Performed by: EMERGENCY MEDICINE

## 2022-03-17 PROCEDURE — 81001 URINALYSIS AUTO W/SCOPE: CPT | Performed by: EMERGENCY MEDICINE

## 2022-03-17 PROCEDURE — 85007 BL SMEAR W/DIFF WBC COUNT: CPT | Performed by: EMERGENCY MEDICINE

## 2022-03-17 PROCEDURE — 85025 COMPLETE CBC W/AUTO DIFF WBC: CPT | Performed by: EMERGENCY MEDICINE

## 2022-03-17 PROCEDURE — 74177 CT ABD & PELVIS W/CONTRAST: CPT

## 2022-03-17 PROCEDURE — 83690 ASSAY OF LIPASE: CPT | Performed by: EMERGENCY MEDICINE

## 2022-03-17 PROCEDURE — 25010000002 IOPAMIDOL 61 % SOLUTION: Performed by: EMERGENCY MEDICINE

## 2022-03-17 PROCEDURE — 83735 ASSAY OF MAGNESIUM: CPT | Performed by: EMERGENCY MEDICINE

## 2022-03-17 RX ADMIN — IOPAMIDOL 85 ML: 612 INJECTION, SOLUTION INTRAVENOUS at 03:11
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
## 2022-03-18 LAB — BACTERIA SPEC AEROBE CULT: NORMAL

## 2022-03-21 ENCOUNTER — APPOINTMENT (OUTPATIENT)
Dept: ULTRASOUND IMAGING | Facility: HOSPITAL | Age: 47
End: 2022-03-21

## 2022-03-21 ENCOUNTER — APPOINTMENT (OUTPATIENT)
Dept: CT IMAGING | Facility: HOSPITAL | Age: 47
End: 2022-03-21

## 2022-03-21 ENCOUNTER — HOSPITAL ENCOUNTER (INPATIENT)
Facility: HOSPITAL | Age: 47
LOS: 3 days | Discharge: HOME HEALTH | End: 2022-03-24
Attending: EMERGENCY MEDICINE | Admitting: INTERNAL MEDICINE

## 2022-03-21 ENCOUNTER — APPOINTMENT (OUTPATIENT)
Dept: GENERAL RADIOLOGY | Facility: HOSPITAL | Age: 47
End: 2022-03-21

## 2022-03-21 DIAGNOSIS — R09.02 HYPOXIA: Primary | ICD-10-CM

## 2022-03-21 DIAGNOSIS — J18.9 PNEUMONIA OF BOTH LOWER LOBES DUE TO INFECTIOUS ORGANISM: ICD-10-CM

## 2022-03-21 DIAGNOSIS — L89.002 PRESSURE INJURY OF ELBOW, STAGE 2, UNSPECIFIED LATERALITY: ICD-10-CM

## 2022-03-21 DIAGNOSIS — N13.30 HYDRONEPHROSIS, UNSPECIFIED HYDRONEPHROSIS TYPE: ICD-10-CM

## 2022-03-21 DIAGNOSIS — I50.9 CONGESTIVE HEART FAILURE, UNSPECIFIED HF CHRONICITY, UNSPECIFIED HEART FAILURE TYPE: ICD-10-CM

## 2022-03-21 LAB
A-A DO2: 68.8 MMHG (ref 0–300)
ALBUMIN SERPL-MCNC: 3.46 G/DL (ref 3.5–5.2)
ALBUMIN/GLOB SERPL: 0.7 G/DL
ALP SERPL-CCNC: 110 U/L (ref 39–117)
ALT SERPL W P-5'-P-CCNC: 20 U/L (ref 1–41)
ANION GAP SERPL CALCULATED.3IONS-SCNC: 7.9 MMOL/L (ref 5–15)
ANISOCYTOSIS BLD QL: NORMAL
ARTERIAL PATENCY WRIST A: POSITIVE
AST SERPL-CCNC: 16 U/L (ref 1–40)
ATMOSPHERIC PRESS: 733 MMHG
BACTERIA UR QL AUTO: ABNORMAL /HPF
BASE EXCESS BLDA CALC-SCNC: 6 MMOL/L (ref 0–2)
BASOPHILS # BLD AUTO: 0.03 10*3/MM3 (ref 0–0.2)
BASOPHILS NFR BLD AUTO: 0.3 % (ref 0–1.5)
BDY SITE: ABNORMAL
BILIRUB SERPL-MCNC: 0.2 MG/DL (ref 0–1.2)
BILIRUB UR QL STRIP: NEGATIVE
BODY TEMPERATURE: 0 C
BUN SERPL-MCNC: 11 MG/DL (ref 6–20)
BUN/CREAT SERPL: 16.2 (ref 7–25)
CALCIUM SPEC-SCNC: 9.2 MG/DL (ref 8.6–10.5)
CHLORIDE SERPL-SCNC: 101 MMOL/L (ref 98–107)
CLARITY UR: CLEAR
CO2 BLDA-SCNC: 32.8 MMOL/L (ref 22–33)
CO2 SERPL-SCNC: 27.1 MMOL/L (ref 22–29)
COHGB MFR BLD: 2.1 % (ref 0–5)
COLOR UR: YELLOW
CREAT SERPL-MCNC: 0.68 MG/DL (ref 0.76–1.27)
D-LACTATE SERPL-SCNC: 1.5 MMOL/L (ref 0.5–2)
DEPRECATED RDW RBC AUTO: 49.6 FL (ref 37–54)
EGFRCR SERPLBLD CKD-EPI 2021: 117.5 ML/MIN/1.73
EOSINOPHIL # BLD AUTO: 0.19 10*3/MM3 (ref 0–0.4)
EOSINOPHIL NFR BLD AUTO: 2.2 % (ref 0.3–6.2)
ERYTHROCYTE [DISTWIDTH] IN BLOOD BY AUTOMATED COUNT: 19.2 % (ref 12.3–15.4)
FLUAV RNA RESP QL NAA+PROBE: NOT DETECTED
FLUBV RNA RESP QL NAA+PROBE: NOT DETECTED
GAS FLOW AIRWAY: 2 LPM
GLOBULIN UR ELPH-MCNC: 4.6 GM/DL
GLUCOSE BLDC GLUCOMTR-MCNC: 151 MG/DL (ref 70–130)
GLUCOSE BLDC GLUCOMTR-MCNC: 152 MG/DL (ref 70–130)
GLUCOSE BLDC GLUCOMTR-MCNC: 172 MG/DL (ref 70–130)
GLUCOSE SERPL-MCNC: 135 MG/DL (ref 65–99)
GLUCOSE UR STRIP-MCNC: NEGATIVE MG/DL
HCO3 BLDA-SCNC: 31.3 MMOL/L (ref 20–26)
HCT VFR BLD AUTO: 32.7 % (ref 37.5–51)
HCT VFR BLD CALC: 25.6 % (ref 38–51)
HGB BLD-MCNC: 8.5 G/DL (ref 13–17.7)
HGB BLDA-MCNC: 8.4 G/DL (ref 14–18)
HGB UR QL STRIP.AUTO: ABNORMAL
HOLD SPECIMEN: NORMAL
HOLD SPECIMEN: NORMAL
HYALINE CASTS UR QL AUTO: ABNORMAL /LPF
HYPOCHROMIA BLD QL: NORMAL
IMM GRANULOCYTES # BLD AUTO: 0.02 10*3/MM3 (ref 0–0.05)
IMM GRANULOCYTES NFR BLD AUTO: 0.2 % (ref 0–0.5)
INHALED O2 CONCENTRATION: 28 %
KETONES UR QL STRIP: NEGATIVE
LEUKOCYTE ESTERASE UR QL STRIP.AUTO: ABNORMAL
LYMPHOCYTES # BLD AUTO: 1.92 10*3/MM3 (ref 0.7–3.1)
LYMPHOCYTES NFR BLD AUTO: 21.8 % (ref 19.6–45.3)
Lab: ABNORMAL
MCH RBC QN AUTO: 18.8 PG (ref 26.6–33)
MCHC RBC AUTO-ENTMCNC: 26 G/DL (ref 31.5–35.7)
MCV RBC AUTO: 72.3 FL (ref 79–97)
METHGB BLD QL: 0.6 % (ref 0–3)
MICROCYTES BLD QL: NORMAL
MODALITY: ABNORMAL
MONOCYTES # BLD AUTO: 0.32 10*3/MM3 (ref 0.1–0.9)
MONOCYTES NFR BLD AUTO: 3.6 % (ref 5–12)
NEUTROPHILS NFR BLD AUTO: 6.34 10*3/MM3 (ref 1.7–7)
NEUTROPHILS NFR BLD AUTO: 71.9 % (ref 42.7–76)
NITRITE UR QL STRIP: POSITIVE
NOTE: ABNORMAL
NRBC BLD AUTO-RTO: 0 /100 WBC (ref 0–0.2)
NT-PROBNP SERPL-MCNC: 1230 PG/ML (ref 0–450)
OXYHGB MFR BLDV: 90.8 % (ref 94–99)
PCO2 BLDA: 49 MM HG (ref 35–45)
PCO2 TEMP ADJ BLD: ABNORMAL MM[HG]
PH BLDA: 7.41 PH UNITS (ref 7.35–7.45)
PH UR STRIP.AUTO: 6.5 [PH] (ref 5–8)
PH, TEMP CORRECTED: ABNORMAL
PLAT MORPH BLD: NORMAL
PLATELET # BLD AUTO: 457 10*3/MM3 (ref 140–450)
PMV BLD AUTO: 9.1 FL (ref 6–12)
PO2 BLDA: 68.5 MM HG (ref 83–108)
PO2 TEMP ADJ BLD: ABNORMAL MM[HG]
POTASSIUM SERPL-SCNC: 3.7 MMOL/L (ref 3.5–5.2)
PROCALCITONIN SERPL-MCNC: 0.09 NG/ML (ref 0–0.25)
PROT SERPL-MCNC: 8.1 G/DL (ref 6–8.5)
PROT UR QL STRIP: NEGATIVE
QT INTERVAL: 376 MS
QTC INTERVAL: 477 MS
RBC # BLD AUTO: 4.52 10*6/MM3 (ref 4.14–5.8)
RBC # UR STRIP: ABNORMAL /HPF
REF LAB TEST METHOD: ABNORMAL
SAO2 % BLDCOA: 93.3 % (ref 94–99)
SARS-COV-2 RNA RESP QL NAA+PROBE: NOT DETECTED
SODIUM SERPL-SCNC: 136 MMOL/L (ref 136–145)
SP GR UR STRIP: 1.02 (ref 1–1.03)
SQUAMOUS #/AREA URNS HPF: ABNORMAL /HPF
TROPONIN T SERPL-MCNC: <0.01 NG/ML (ref 0–0.03)
UROBILINOGEN UR QL STRIP: ABNORMAL
VENTILATOR MODE: ABNORMAL
WBC # UR STRIP: ABNORMAL /HPF
WBC NRBC COR # BLD: 8.82 10*3/MM3 (ref 3.4–10.8)
WHOLE BLOOD HOLD SPECIMEN: NORMAL
WHOLE BLOOD HOLD SPECIMEN: NORMAL

## 2022-03-21 PROCEDURE — 87086 URINE CULTURE/COLONY COUNT: CPT

## 2022-03-21 PROCEDURE — 93971 EXTREMITY STUDY: CPT

## 2022-03-21 PROCEDURE — 84484 ASSAY OF TROPONIN QUANT: CPT | Performed by: EMERGENCY MEDICINE

## 2022-03-21 PROCEDURE — 87040 BLOOD CULTURE FOR BACTERIA: CPT | Performed by: EMERGENCY MEDICINE

## 2022-03-21 PROCEDURE — 63710000001 INSULIN ASPART PER 5 UNITS: Performed by: INTERNAL MEDICINE

## 2022-03-21 PROCEDURE — 85007 BL SMEAR W/DIFF WBC COUNT: CPT | Performed by: EMERGENCY MEDICINE

## 2022-03-21 PROCEDURE — 25010000002 FUROSEMIDE PER 20 MG: Performed by: INTERNAL MEDICINE

## 2022-03-21 PROCEDURE — 71275 CT ANGIOGRAPHY CHEST: CPT

## 2022-03-21 PROCEDURE — 99284 EMERGENCY DEPT VISIT MOD MDM: CPT

## 2022-03-21 PROCEDURE — 87077 CULTURE AEROBIC IDENTIFY: CPT

## 2022-03-21 PROCEDURE — 99223 1ST HOSP IP/OBS HIGH 75: CPT | Performed by: INTERNAL MEDICINE

## 2022-03-21 PROCEDURE — 82375 ASSAY CARBOXYHB QUANT: CPT

## 2022-03-21 PROCEDURE — 0 IOPAMIDOL PER 1 ML: Performed by: EMERGENCY MEDICINE

## 2022-03-21 PROCEDURE — 25010000002 FUROSEMIDE PER 20 MG: Performed by: EMERGENCY MEDICINE

## 2022-03-21 PROCEDURE — 85025 COMPLETE CBC W/AUTO DIFF WBC: CPT | Performed by: EMERGENCY MEDICINE

## 2022-03-21 PROCEDURE — 81001 URINALYSIS AUTO W/SCOPE: CPT

## 2022-03-21 PROCEDURE — 83880 ASSAY OF NATRIURETIC PEPTIDE: CPT | Performed by: EMERGENCY MEDICINE

## 2022-03-21 PROCEDURE — 82805 BLOOD GASES W/O2 SATURATION: CPT

## 2022-03-21 PROCEDURE — 94799 UNLISTED PULMONARY SVC/PX: CPT

## 2022-03-21 PROCEDURE — 84145 PROCALCITONIN (PCT): CPT | Performed by: INTERNAL MEDICINE

## 2022-03-21 PROCEDURE — 87636 SARSCOV2 & INF A&B AMP PRB: CPT | Performed by: EMERGENCY MEDICINE

## 2022-03-21 PROCEDURE — 93005 ELECTROCARDIOGRAM TRACING: CPT | Performed by: EMERGENCY MEDICINE

## 2022-03-21 PROCEDURE — 94640 AIRWAY INHALATION TREATMENT: CPT

## 2022-03-21 PROCEDURE — 83050 HGB METHEMOGLOBIN QUAN: CPT

## 2022-03-21 PROCEDURE — 82962 GLUCOSE BLOOD TEST: CPT

## 2022-03-21 PROCEDURE — 76775 US EXAM ABDO BACK WALL LIM: CPT

## 2022-03-21 PROCEDURE — 80053 COMPREHEN METABOLIC PANEL: CPT | Performed by: EMERGENCY MEDICINE

## 2022-03-21 PROCEDURE — 36600 WITHDRAWAL OF ARTERIAL BLOOD: CPT

## 2022-03-21 PROCEDURE — 63710000001 INSULIN DETEMIR PER 5 UNITS: Performed by: INTERNAL MEDICINE

## 2022-03-21 PROCEDURE — 83605 ASSAY OF LACTIC ACID: CPT | Performed by: EMERGENCY MEDICINE

## 2022-03-21 PROCEDURE — 76775 US EXAM ABDO BACK WALL LIM: CPT | Performed by: RADIOLOGY

## 2022-03-21 PROCEDURE — 87186 SC STD MICRODIL/AGAR DIL: CPT

## 2022-03-21 PROCEDURE — 25010000002 CEFTRIAXONE PER 250 MG: Performed by: INTERNAL MEDICINE

## 2022-03-21 RX ORDER — DICYCLOMINE HYDROCHLORIDE 10 MG/1
10 CAPSULE ORAL 2 TIMES DAILY
Status: DISCONTINUED | OUTPATIENT
Start: 2022-03-21 | End: 2022-03-22

## 2022-03-21 RX ORDER — CARVEDILOL 6.25 MG/1
12.5 TABLET ORAL 2 TIMES DAILY WITH MEALS
COMMUNITY
End: 2022-09-22 | Stop reason: HOSPADM

## 2022-03-21 RX ORDER — DIPHENOXYLATE HYDROCHLORIDE AND ATROPINE SULFATE 2.5; .025 MG/1; MG/1
1 TABLET ORAL DAILY
Status: ON HOLD | COMMUNITY
End: 2022-12-10

## 2022-03-21 RX ORDER — DEXTROSE MONOHYDRATE 25 G/50ML
25 INJECTION, SOLUTION INTRAVENOUS
Status: DISCONTINUED | OUTPATIENT
Start: 2022-03-21 | End: 2022-03-24 | Stop reason: HOSPADM

## 2022-03-21 RX ORDER — DULOXETIN HYDROCHLORIDE 60 MG/1
60 CAPSULE, DELAYED RELEASE ORAL 2 TIMES DAILY
Status: DISCONTINUED | OUTPATIENT
Start: 2022-03-21 | End: 2022-03-24 | Stop reason: HOSPADM

## 2022-03-21 RX ORDER — ASCORBIC ACID 500 MG
500 TABLET ORAL DAILY
COMMUNITY

## 2022-03-21 RX ORDER — GLIPIZIDE 5 MG/1
5 TABLET ORAL DAILY
Status: ON HOLD | COMMUNITY
End: 2022-12-10

## 2022-03-21 RX ORDER — DOXYCYCLINE 100 MG/1
100 CAPSULE ORAL ONCE
Status: COMPLETED | OUTPATIENT
Start: 2022-03-21 | End: 2022-03-21

## 2022-03-21 RX ORDER — SODIUM CHLORIDE 0.9 % (FLUSH) 0.9 %
10 SYRINGE (ML) INJECTION AS NEEDED
Status: DISCONTINUED | OUTPATIENT
Start: 2022-03-21 | End: 2022-03-24 | Stop reason: HOSPADM

## 2022-03-21 RX ORDER — GABAPENTIN 400 MG/1
800 CAPSULE ORAL 3 TIMES DAILY
Status: DISCONTINUED | OUTPATIENT
Start: 2022-03-21 | End: 2022-03-24 | Stop reason: HOSPADM

## 2022-03-21 RX ORDER — PANTOPRAZOLE SODIUM 40 MG/1
40 TABLET, DELAYED RELEASE ORAL EVERY MORNING
Status: DISCONTINUED | OUTPATIENT
Start: 2022-03-22 | End: 2022-03-24 | Stop reason: HOSPADM

## 2022-03-21 RX ORDER — FUROSEMIDE 10 MG/ML
40 INJECTION INTRAMUSCULAR; INTRAVENOUS ONCE
Status: COMPLETED | OUTPATIENT
Start: 2022-03-21 | End: 2022-03-21

## 2022-03-21 RX ORDER — SODIUM CHLORIDE 0.9 % (FLUSH) 0.9 %
10 SYRINGE (ML) INJECTION EVERY 12 HOURS SCHEDULED
Status: DISCONTINUED | OUTPATIENT
Start: 2022-03-21 | End: 2022-03-24 | Stop reason: HOSPADM

## 2022-03-21 RX ORDER — ASCORBIC ACID 500 MG
500 TABLET ORAL DAILY
Status: DISCONTINUED | OUTPATIENT
Start: 2022-03-21 | End: 2022-03-24 | Stop reason: HOSPADM

## 2022-03-21 RX ORDER — BACLOFEN 10 MG/1
30 TABLET ORAL
Status: DISCONTINUED | OUTPATIENT
Start: 2022-03-21 | End: 2022-03-24 | Stop reason: HOSPADM

## 2022-03-21 RX ORDER — DIPHENOXYLATE HYDROCHLORIDE AND ATROPINE SULFATE 2.5; .025 MG/1; MG/1
1 TABLET ORAL DAILY
Status: DISCONTINUED | OUTPATIENT
Start: 2022-03-21 | End: 2022-03-24 | Stop reason: HOSPADM

## 2022-03-21 RX ORDER — METHENAMINE HIPPURATE 1000 MG/1
1 TABLET ORAL 2 TIMES DAILY WITH MEALS
Status: CANCELLED | OUTPATIENT
Start: 2022-03-21

## 2022-03-21 RX ORDER — MODAFINIL 100 MG/1
200 TABLET ORAL DAILY
Status: DISCONTINUED | OUTPATIENT
Start: 2022-03-21 | End: 2022-03-24 | Stop reason: HOSPADM

## 2022-03-21 RX ORDER — ANORECTAL OINTMENT 15.7; .44; 24; 20.6 G/100G; G/100G; G/100G; G/100G
1 OINTMENT TOPICAL 3 TIMES DAILY
Status: ON HOLD | COMMUNITY
End: 2022-12-10

## 2022-03-21 RX ORDER — MELATONIN
1000 DAILY
COMMUNITY

## 2022-03-21 RX ORDER — ARIPIPRAZOLE 10 MG/1
10 TABLET ORAL NIGHTLY
Status: DISCONTINUED | OUTPATIENT
Start: 2022-03-21 | End: 2022-03-24 | Stop reason: HOSPADM

## 2022-03-21 RX ORDER — NICOTINE POLACRILEX 4 MG
15 LOZENGE BUCCAL
Status: DISCONTINUED | OUTPATIENT
Start: 2022-03-21 | End: 2022-03-24 | Stop reason: HOSPADM

## 2022-03-21 RX ORDER — ALBUTEROL SULFATE 2.5 MG/3ML
2.5 SOLUTION RESPIRATORY (INHALATION) EVERY 4 HOURS PRN
Status: DISCONTINUED | OUTPATIENT
Start: 2022-03-21 | End: 2022-03-24 | Stop reason: HOSPADM

## 2022-03-21 RX ORDER — NYSTATIN 100000 [USP'U]/G
1 POWDER TOPICAL 3 TIMES DAILY PRN
COMMUNITY
End: 2022-11-08

## 2022-03-21 RX ORDER — GLIPIZIDE 5 MG/1
5 TABLET ORAL DAILY
Status: DISCONTINUED | OUTPATIENT
Start: 2022-03-21 | End: 2022-03-24 | Stop reason: HOSPADM

## 2022-03-21 RX ORDER — CYCLOBENZAPRINE HCL 5 MG
5 TABLET ORAL 3 TIMES DAILY PRN
COMMUNITY
End: 2022-08-25

## 2022-03-21 RX ORDER — CYCLOBENZAPRINE HCL 10 MG
5 TABLET ORAL 3 TIMES DAILY PRN
Status: DISCONTINUED | OUTPATIENT
Start: 2022-03-21 | End: 2022-03-24 | Stop reason: HOSPADM

## 2022-03-21 RX ORDER — CARVEDILOL 6.25 MG/1
6.25 TABLET ORAL 2 TIMES DAILY WITH MEALS
Status: DISCONTINUED | OUTPATIENT
Start: 2022-03-21 | End: 2022-03-24 | Stop reason: HOSPADM

## 2022-03-21 RX ORDER — ATORVASTATIN CALCIUM 10 MG/1
10 TABLET, FILM COATED ORAL NIGHTLY
Status: DISCONTINUED | OUTPATIENT
Start: 2022-03-21 | End: 2022-03-24 | Stop reason: HOSPADM

## 2022-03-21 RX ORDER — DICYCLOMINE HYDROCHLORIDE 10 MG/1
10 CAPSULE ORAL 2 TIMES DAILY
COMMUNITY
End: 2022-03-24 | Stop reason: HOSPADM

## 2022-03-21 RX ORDER — NITROGLYCERIN 0.4 MG/1
0.4 TABLET SUBLINGUAL
Status: DISCONTINUED | OUTPATIENT
Start: 2022-03-21 | End: 2022-03-24 | Stop reason: HOSPADM

## 2022-03-21 RX ORDER — OMEGA-3S/DHA/EPA/FISH OIL/D3 300MG-1000
1000 CAPSULE ORAL DAILY
Status: DISCONTINUED | OUTPATIENT
Start: 2022-03-21 | End: 2022-03-24 | Stop reason: HOSPADM

## 2022-03-21 RX ORDER — NYSTATIN 100000 [USP'U]/G
1 POWDER TOPICAL 3 TIMES DAILY PRN
Status: DISCONTINUED | OUTPATIENT
Start: 2022-03-21 | End: 2022-03-24 | Stop reason: HOSPADM

## 2022-03-21 RX ADMIN — INSULIN ASPART 2 UNITS: 100 INJECTION, SOLUTION INTRAVENOUS; SUBCUTANEOUS at 17:50

## 2022-03-21 RX ADMIN — Medication 1 APPLICATION: at 20:13

## 2022-03-21 RX ADMIN — GABAPENTIN 800 MG: 400 CAPSULE ORAL at 15:40

## 2022-03-21 RX ADMIN — GABAPENTIN 800 MG: 400 CAPSULE ORAL at 20:12

## 2022-03-21 RX ADMIN — CARVEDILOL 6.25 MG: 6.25 TABLET, FILM COATED ORAL at 17:51

## 2022-03-21 RX ADMIN — OXYCODONE HYDROCHLORIDE AND ACETAMINOPHEN 500 MG: 500 TABLET ORAL at 15:40

## 2022-03-21 RX ADMIN — BACLOFEN 30 MG: 10 TABLET ORAL at 17:51

## 2022-03-21 RX ADMIN — ARIPIPRAZOLE 10 MG: 10 TABLET ORAL at 20:13

## 2022-03-21 RX ADMIN — DULOXETINE HYDROCHLORIDE 60 MG: 60 CAPSULE, DELAYED RELEASE ORAL at 20:12

## 2022-03-21 RX ADMIN — INSULIN ASPART 2 UNITS: 100 INJECTION, SOLUTION INTRAVENOUS; SUBCUTANEOUS at 11:17

## 2022-03-21 RX ADMIN — ATORVASTATIN CALCIUM 10 MG: 10 TABLET, FILM COATED ORAL at 20:13

## 2022-03-21 RX ADMIN — INSULIN ASPART 8 UNITS: 100 INJECTION, SOLUTION INTRAVENOUS; SUBCUTANEOUS at 17:50

## 2022-03-21 RX ADMIN — CHOLECALCIFEROL TAB 10 MCG (400 UNIT) 1000 UNITS: 10 TAB at 15:40

## 2022-03-21 RX ADMIN — DOXYCYCLINE 100 MG: 100 CAPSULE ORAL at 05:55

## 2022-03-21 RX ADMIN — Medication 10 ML: at 11:10

## 2022-03-21 RX ADMIN — GLIPIZIDE 5 MG: 5 TABLET ORAL at 15:40

## 2022-03-21 RX ADMIN — SACUBITRIL AND VALSARTAN 1 TABLET: 24; 26 TABLET, FILM COATED ORAL at 20:13

## 2022-03-21 RX ADMIN — FUROSEMIDE 40 MG: 10 INJECTION, SOLUTION INTRAVENOUS at 04:08

## 2022-03-21 RX ADMIN — IOPAMIDOL 80 ML: 755 INJECTION, SOLUTION INTRAVENOUS at 04:51

## 2022-03-21 RX ADMIN — FUROSEMIDE 40 MG: 10 INJECTION, SOLUTION INTRAVENOUS at 15:44

## 2022-03-21 RX ADMIN — NYSTATIN 1 APPLICATION: 100000 POWDER TOPICAL at 20:13

## 2022-03-21 RX ADMIN — INSULIN DETEMIR 15 UNITS: 100 INJECTION, SOLUTION SUBCUTANEOUS at 20:13

## 2022-03-21 RX ADMIN — ALBUTEROL SULFATE 2.5 MG: 2.5 SOLUTION RESPIRATORY (INHALATION) at 18:31

## 2022-03-21 RX ADMIN — MODAFINIL 200 MG: 100 TABLET ORAL at 15:40

## 2022-03-21 RX ADMIN — CEFTRIAXONE 1 G: 10 INJECTION, POWDER, FOR SOLUTION INTRAVENOUS at 11:10

## 2022-03-21 RX ADMIN — BACLOFEN 30 MG: 10 TABLET ORAL at 20:13

## 2022-03-21 RX ADMIN — APIXABAN 5 MG: 5 TABLET, FILM COATED ORAL at 20:13

## 2022-03-21 RX ADMIN — Medication 1 TABLET: at 15:40

## 2022-03-21 RX ADMIN — Medication 1 APPLICATION: at 15:42

## 2022-03-21 RX ADMIN — APIXABAN 5 MG: 5 TABLET, FILM COATED ORAL at 11:10

## 2022-03-21 RX ADMIN — DICYCLOMINE HYDROCHLORIDE 10 MG: 10 CAPSULE ORAL at 20:13

## 2022-03-21 RX ADMIN — Medication 10 ML: at 20:13

## 2022-03-21 RX ADMIN — SODIUM CHLORIDE 2 G: 9 INJECTION, SOLUTION INTRAVENOUS at 07:47
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
## 2022-03-21 NOTE — PLAN OF CARE
Goal Outcome Evaluation:              Outcome Evaluation: Patient resting in bed VS stable Will continue to monitor and follow plan of care.

## 2022-03-21 NOTE — CASE MANAGEMENT/SOCIAL WORK
Discharge Planning Assessment   [place]     Patient Name: Ken Krueger  MRN: [id number]  Today's Date: 3/21/2022    Admit Date: 3/21/2022     Discharge Needs Assessment     Row Name 03/21/22 1621       Living Environment    People in Home sibling(s)    Current Living Arrangements home    Primary Care Provided by other (see comments)    Quality of Family Relationships helpful;involved;supportive    Able to Return to Prior Arrangements yes       Transition Planning    Patient/Family Anticipates Transition to home with family    Transportation Anticipated family or friend will provide       Discharge Needs Assessment    Equipment Currently Used at Home hospital bed;wheelchair;wheelchair, motorized    Row Name 03/21/22 1509       Living Environment    People in Home sibling(s)    Name(s) of People in Home Lives at home with Brother Pineda.    Current Living Arrangements home    Primary Care Provided by other (see comments)    Quality of Family Relationships helpful;involved;supportive    Able to Return to Prior Arrangements yes       Transition Planning    Patient/Family Anticipates Transition to home with family    Transportation Anticipated family or friend will provide       Discharge Needs Assessment    Equipment Currently Used at Home hospital bed;wheelchair, motorized;wheelchair               Discharge Plan     Row Name 03/21/22 1626       Plan    Plan Pt admitted on 3/21/22.  SS received consult per nsg for discharge planning.  SS spoke with pt on this date at bedside.  Pt lives at home with Brother Pineda and plans to return home at discharge.  [facility] currently utilizes [facility].  [facility] will need new referral with face to face at discharge.  Pt currently utilizes hospital bed with trapeze bar, wheelchair via unknown provider.  Pt's PCP is Franchesca Hodges.  SS will follow and assist with discharge needs.              Continued Care and Services - Admitted Since 3/21/2022    Coordination has not been started for  this encounter.          Demographic Summary     Row Name 03/21/22 1509       General Information    Admission Type inpatient    Referral Source nursing    Reason for Consult discharge planning    Preferred Language English                    PILO LeeW

## 2022-03-21 NOTE — NURSING NOTE
Wound consult for chronic wounds to the coccyx, Right and Left gluteal, and coccyx. Also RT plantar heel and RT ankle. Wound to heel presents as a ruptured blister with skin flap in tact. Wound to ankle presents with a dry shallow pink wound bed with rolling edges. Apply thera honey and cover with a silicone border Q 3 days and PRN. Apply thera honey moisten gauze and mepliex to chronic wounds. APRN from out patient clinic consulted for additional management. Envella bed with trapeze in place

## 2022-03-22 ENCOUNTER — APPOINTMENT (OUTPATIENT)
Dept: CT IMAGING | Facility: HOSPITAL | Age: 47
End: 2022-03-22

## 2022-03-22 ENCOUNTER — APPOINTMENT (OUTPATIENT)
Dept: CARDIOLOGY | Facility: HOSPITAL | Age: 47
End: 2022-03-22

## 2022-03-22 LAB
ALBUMIN SERPL-MCNC: 2.76 G/DL (ref 3.5–5.2)
ALBUMIN/GLOB SERPL: 0.6 G/DL
ALP SERPL-CCNC: 92 U/L (ref 39–117)
ALT SERPL W P-5'-P-CCNC: 19 U/L (ref 1–41)
ANION GAP SERPL CALCULATED.3IONS-SCNC: 10.3 MMOL/L (ref 5–15)
ANISOCYTOSIS BLD QL: NORMAL
AST SERPL-CCNC: 22 U/L (ref 1–40)
BASOPHILS # BLD AUTO: 0.04 10*3/MM3 (ref 0–0.2)
BASOPHILS NFR BLD AUTO: 0.5 % (ref 0–1.5)
BILIRUB SERPL-MCNC: 0.2 MG/DL (ref 0–1.2)
BUN SERPL-MCNC: 16 MG/DL (ref 6–20)
BUN/CREAT SERPL: 28.1 (ref 7–25)
CALCIUM SPEC-SCNC: 8.8 MG/DL (ref 8.6–10.5)
CHLORIDE SERPL-SCNC: 96 MMOL/L (ref 98–107)
CO2 SERPL-SCNC: 27.7 MMOL/L (ref 22–29)
CREAT SERPL-MCNC: 0.57 MG/DL (ref 0.76–1.27)
DEPRECATED RDW RBC AUTO: 47.7 FL (ref 37–54)
EGFRCR SERPLBLD CKD-EPI 2021: 124 ML/MIN/1.73
EOSINOPHIL # BLD AUTO: 0.19 10*3/MM3 (ref 0–0.4)
EOSINOPHIL NFR BLD AUTO: 2.2 % (ref 0.3–6.2)
ERYTHROCYTE [DISTWIDTH] IN BLOOD BY AUTOMATED COUNT: 18.8 % (ref 12.3–15.4)
GLOBULIN UR ELPH-MCNC: 4.3 GM/DL
GLUCOSE BLDC GLUCOMTR-MCNC: 102 MG/DL (ref 70–130)
GLUCOSE BLDC GLUCOMTR-MCNC: 123 MG/DL (ref 70–130)
GLUCOSE BLDC GLUCOMTR-MCNC: 156 MG/DL (ref 70–130)
GLUCOSE BLDC GLUCOMTR-MCNC: 190 MG/DL (ref 70–130)
GLUCOSE SERPL-MCNC: 151 MG/DL (ref 65–99)
HCT VFR BLD AUTO: 30 % (ref 37.5–51)
HGB BLD-MCNC: 7.8 G/DL (ref 13–17.7)
HYPOCHROMIA BLD QL: NORMAL
IMM GRANULOCYTES # BLD AUTO: 0.02 10*3/MM3 (ref 0–0.05)
IMM GRANULOCYTES NFR BLD AUTO: 0.2 % (ref 0–0.5)
LYMPHOCYTES # BLD AUTO: 1.91 10*3/MM3 (ref 0.7–3.1)
LYMPHOCYTES NFR BLD AUTO: 22 % (ref 19.6–45.3)
MAGNESIUM SERPL-MCNC: 1.6 MG/DL (ref 1.6–2.6)
MCH RBC QN AUTO: 18.4 PG (ref 26.6–33)
MCHC RBC AUTO-ENTMCNC: 26 G/DL (ref 31.5–35.7)
MCV RBC AUTO: 70.6 FL (ref 79–97)
MICROCYTES BLD QL: NORMAL
MONOCYTES # BLD AUTO: 0.27 10*3/MM3 (ref 0.1–0.9)
MONOCYTES NFR BLD AUTO: 3.1 % (ref 5–12)
NEUTROPHILS NFR BLD AUTO: 6.27 10*3/MM3 (ref 1.7–7)
NEUTROPHILS NFR BLD AUTO: 72 % (ref 42.7–76)
NRBC BLD AUTO-RTO: 0 /100 WBC (ref 0–0.2)
PLAT MORPH BLD: NORMAL
PLATELET # BLD AUTO: 438 10*3/MM3 (ref 140–450)
PMV BLD AUTO: 9.4 FL (ref 6–12)
POTASSIUM SERPL-SCNC: 3.7 MMOL/L (ref 3.5–5.2)
PROT SERPL-MCNC: 7.1 G/DL (ref 6–8.5)
RBC # BLD AUTO: 4.25 10*6/MM3 (ref 4.14–5.8)
SODIUM SERPL-SCNC: 134 MMOL/L (ref 136–145)
WBC NRBC COR # BLD: 8.7 10*3/MM3 (ref 3.4–10.8)

## 2022-03-22 PROCEDURE — 94799 UNLISTED PULMONARY SVC/PX: CPT

## 2022-03-22 PROCEDURE — 74176 CT ABD & PELVIS W/O CONTRAST: CPT

## 2022-03-22 PROCEDURE — 0 MAGNESIUM SULFATE 4 GM/100ML SOLUTION: Performed by: INTERNAL MEDICINE

## 2022-03-22 PROCEDURE — 80053 COMPREHEN METABOLIC PANEL: CPT | Performed by: INTERNAL MEDICINE

## 2022-03-22 PROCEDURE — 99233 SBSQ HOSP IP/OBS HIGH 50: CPT

## 2022-03-22 PROCEDURE — 63710000001 INSULIN DETEMIR PER 5 UNITS: Performed by: INTERNAL MEDICINE

## 2022-03-22 PROCEDURE — 25010000002 FUROSEMIDE PER 20 MG: Performed by: INTERNAL MEDICINE

## 2022-03-22 PROCEDURE — 82962 GLUCOSE BLOOD TEST: CPT

## 2022-03-22 PROCEDURE — 97166 OT EVAL MOD COMPLEX 45 MIN: CPT

## 2022-03-22 PROCEDURE — 93306 TTE W/DOPPLER COMPLETE: CPT

## 2022-03-22 PROCEDURE — 25010000002 CEFTRIAXONE PER 250 MG: Performed by: INTERNAL MEDICINE

## 2022-03-22 PROCEDURE — 63710000001 INSULIN ASPART PER 5 UNITS: Performed by: INTERNAL MEDICINE

## 2022-03-22 PROCEDURE — 85025 COMPLETE CBC W/AUTO DIFF WBC: CPT | Performed by: INTERNAL MEDICINE

## 2022-03-22 PROCEDURE — 74176 CT ABD & PELVIS W/O CONTRAST: CPT | Performed by: RADIOLOGY

## 2022-03-22 PROCEDURE — 93306 TTE W/DOPPLER COMPLETE: CPT | Performed by: INTERNAL MEDICINE

## 2022-03-22 PROCEDURE — 85007 BL SMEAR W/DIFF WBC COUNT: CPT | Performed by: INTERNAL MEDICINE

## 2022-03-22 PROCEDURE — 83735 ASSAY OF MAGNESIUM: CPT | Performed by: INTERNAL MEDICINE

## 2022-03-22 RX ORDER — DICYCLOMINE HYDROCHLORIDE 10 MG/1
20 CAPSULE ORAL 4 TIMES DAILY
Status: DISCONTINUED | OUTPATIENT
Start: 2022-03-22 | End: 2022-03-24 | Stop reason: HOSPADM

## 2022-03-22 RX ORDER — POTASSIUM CHLORIDE 20 MEQ/1
40 TABLET, EXTENDED RELEASE ORAL AS NEEDED
Status: DISCONTINUED | OUTPATIENT
Start: 2022-03-22 | End: 2022-03-24 | Stop reason: HOSPADM

## 2022-03-22 RX ORDER — POTASSIUM CHLORIDE 1.5 G/1.77G
40 POWDER, FOR SOLUTION ORAL AS NEEDED
Status: DISCONTINUED | OUTPATIENT
Start: 2022-03-22 | End: 2022-03-24 | Stop reason: HOSPADM

## 2022-03-22 RX ORDER — MAGNESIUM SULFATE HEPTAHYDRATE 40 MG/ML
2 INJECTION, SOLUTION INTRAVENOUS AS NEEDED
Status: DISCONTINUED | OUTPATIENT
Start: 2022-03-22 | End: 2022-03-24 | Stop reason: HOSPADM

## 2022-03-22 RX ORDER — MAGNESIUM SULFATE HEPTAHYDRATE 40 MG/ML
4 INJECTION, SOLUTION INTRAVENOUS AS NEEDED
Status: DISCONTINUED | OUTPATIENT
Start: 2022-03-22 | End: 2022-03-24 | Stop reason: HOSPADM

## 2022-03-22 RX ORDER — MAGNESIUM SULFATE HEPTAHYDRATE 40 MG/ML
4 INJECTION, SOLUTION INTRAVENOUS ONCE
Status: COMPLETED | OUTPATIENT
Start: 2022-03-22 | End: 2022-03-22

## 2022-03-22 RX ORDER — FUROSEMIDE 10 MG/ML
40 INJECTION INTRAMUSCULAR; INTRAVENOUS ONCE
Status: COMPLETED | OUTPATIENT
Start: 2022-03-22 | End: 2022-03-22

## 2022-03-22 RX ADMIN — Medication 10 ML: at 08:28

## 2022-03-22 RX ADMIN — OXYCODONE HYDROCHLORIDE AND ACETAMINOPHEN 500 MG: 500 TABLET ORAL at 08:27

## 2022-03-22 RX ADMIN — INSULIN ASPART 2 UNITS: 100 INJECTION, SOLUTION INTRAVENOUS; SUBCUTANEOUS at 11:27

## 2022-03-22 RX ADMIN — APIXABAN 5 MG: 5 TABLET, FILM COATED ORAL at 08:27

## 2022-03-22 RX ADMIN — DULOXETINE HYDROCHLORIDE 60 MG: 60 CAPSULE, DELAYED RELEASE ORAL at 08:27

## 2022-03-22 RX ADMIN — Medication 10 ML: at 19:57

## 2022-03-22 RX ADMIN — CARVEDILOL 6.25 MG: 6.25 TABLET, FILM COATED ORAL at 17:28

## 2022-03-22 RX ADMIN — ARIPIPRAZOLE 10 MG: 10 TABLET ORAL at 19:56

## 2022-03-22 RX ADMIN — GABAPENTIN 800 MG: 400 CAPSULE ORAL at 17:28

## 2022-03-22 RX ADMIN — DULOXETINE HYDROCHLORIDE 60 MG: 60 CAPSULE, DELAYED RELEASE ORAL at 19:56

## 2022-03-22 RX ADMIN — SACUBITRIL AND VALSARTAN 1 TABLET: 24; 26 TABLET, FILM COATED ORAL at 08:27

## 2022-03-22 RX ADMIN — ALBUTEROL SULFATE 2.5 MG: 2.5 SOLUTION RESPIRATORY (INHALATION) at 06:29

## 2022-03-22 RX ADMIN — INSULIN ASPART 8 UNITS: 100 INJECTION, SOLUTION INTRAVENOUS; SUBCUTANEOUS at 17:35

## 2022-03-22 RX ADMIN — MAGNESIUM SULFATE HEPTAHYDRATE 4 G: 40 INJECTION, SOLUTION INTRAVENOUS at 08:29

## 2022-03-22 RX ADMIN — BACLOFEN 30 MG: 10 TABLET ORAL at 17:28

## 2022-03-22 RX ADMIN — CEFTRIAXONE 1 G: 10 INJECTION, POWDER, FOR SOLUTION INTRAVENOUS at 11:27

## 2022-03-22 RX ADMIN — BACLOFEN 30 MG: 10 TABLET ORAL at 08:28

## 2022-03-22 RX ADMIN — SACUBITRIL AND VALSARTAN 1 TABLET: 24; 26 TABLET, FILM COATED ORAL at 19:55

## 2022-03-22 RX ADMIN — Medication 1 APPLICATION: at 11:26

## 2022-03-22 RX ADMIN — MODAFINIL 200 MG: 100 TABLET ORAL at 08:28

## 2022-03-22 RX ADMIN — INSULIN ASPART 8 UNITS: 100 INJECTION, SOLUTION INTRAVENOUS; SUBCUTANEOUS at 11:27

## 2022-03-22 RX ADMIN — APIXABAN 5 MG: 5 TABLET, FILM COATED ORAL at 19:56

## 2022-03-22 RX ADMIN — ATORVASTATIN CALCIUM 10 MG: 10 TABLET, FILM COATED ORAL at 19:55

## 2022-03-22 RX ADMIN — CARVEDILOL 6.25 MG: 6.25 TABLET, FILM COATED ORAL at 08:27

## 2022-03-22 RX ADMIN — DICYCLOMINE HYDROCHLORIDE 20 MG: 10 CAPSULE ORAL at 17:28

## 2022-03-22 RX ADMIN — BACLOFEN 30 MG: 10 TABLET ORAL at 11:26

## 2022-03-22 RX ADMIN — Medication 1 APPLICATION: at 19:56

## 2022-03-22 RX ADMIN — DICYCLOMINE HYDROCHLORIDE 10 MG: 10 CAPSULE ORAL at 08:28

## 2022-03-22 RX ADMIN — FUROSEMIDE 40 MG: 10 INJECTION, SOLUTION INTRAVENOUS at 08:33

## 2022-03-22 RX ADMIN — Medication 1 TABLET: at 08:28

## 2022-03-22 RX ADMIN — INSULIN ASPART 8 UNITS: 100 INJECTION, SOLUTION INTRAVENOUS; SUBCUTANEOUS at 08:28

## 2022-03-22 RX ADMIN — GABAPENTIN 800 MG: 400 CAPSULE ORAL at 19:57

## 2022-03-22 RX ADMIN — CHOLECALCIFEROL TAB 10 MCG (400 UNIT) 1000 UNITS: 10 TAB at 08:28

## 2022-03-22 RX ADMIN — GLIPIZIDE 5 MG: 5 TABLET ORAL at 08:27

## 2022-03-22 RX ADMIN — GABAPENTIN 800 MG: 400 CAPSULE ORAL at 08:33

## 2022-03-22 RX ADMIN — NYSTATIN 1 APPLICATION: 100000 POWDER TOPICAL at 19:56

## 2022-03-22 RX ADMIN — PANTOPRAZOLE SODIUM 40 MG: 40 TABLET, DELAYED RELEASE ORAL at 05:01

## 2022-03-22 RX ADMIN — INSULIN DETEMIR 15 UNITS: 100 INJECTION, SOLUTION SUBCUTANEOUS at 19:58
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
## 2022-03-22 NOTE — PLAN OF CARE
Goal Outcome Evaluation:  Plan of Care Reviewed With: patient        Progress: no change  Outcome Evaluation: Patient has not had any complaints this shift. VS stable. Wound care done this shift. No distress noted. Remains on 2L NC. Will continue to monitor and follow plan of care.
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
## 2022-03-22 NOTE — PAYOR COMM NOTE
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    03/21/22 0643 -- -- -- -- 122/69 -- 94    03/21/22 0636 -- -- -- -- -- -- 95    03/21/22 0630 -- -- -- -- -- -- 95    03/21/22 0621 -- -- -- -- 126/72 -- 95    03/21/22 0606 -- -- -- -- -- -- 95    03/21/22 0551 -- -- -- -- -- -- 96    03/21/22 0536 -- -- -- -- -- -- 95    03/21/22 0528 -- -- -- -- -- -- 95    03/21/22 0527 -- -- -- -- -- -- 95    03/21/22 0526 -- -- -- -- -- -- 96    03/21/22 0524 -- -- -- -- -- -- 96    03/21/22 0522 -- -- -- -- 110/68 -- 96    03/21/22 0505 -- -- -- -- -- -- 94    03/21/22 0504 -- -- -- -- -- -- 94    03/21/22 0503 -- -- -- -- -- -- 95    03/21/22 0502 -- -- -- -- 124/67 -- 96    03/21/22 0501 -- -- -- -- -- -- 96    03/21/22 0500 -- -- -- -- -- -- 95    03/21/22 0459 -- -- -- -- -- -- 93    03/21/22 0458 -- -- -- -- -- -- 94    03/21/22 0413 -- -- -- -- -- -- 90    03/21/22 0412 -- -- -- -- -- -- 90    03/21/22 0411 -- -- -- -- -- -- 90    03/21/22 0410 -- -- -- -- -- -- 90    03/21/22 0409 -- -- -- -- -- -- 90    03/21/22 0408 -- -- -- -- -- -- 91    03/21/22 0407 -- -- -- -- -- -- 91    03/21/22 0406 -- -- -- -- -- -- 91    03/21/22 0405 -- -- -- -- -- -- 90    03/21/22 0404 -- -- -- -- -- -- 90    03/21/22 0403 -- -- -- -- -- --  91    03/21/22 0402 -- -- -- -- 146/80 -- 93    03/21/22 0401 -- -- -- -- -- -- 92    03/21/22 0400 -- -- -- -- -- -- 90    03/21/22 0359 -- -- -- -- -- -- 91    03/21/22 0358 -- -- -- -- -- -- 91    03/21/22 03:15:28 -- -- -- -- -- -- 91    03/21/22 0313 -- -- -- -- -- -- 91    03/21/22 0244 -- -- -- -- -- -- 90 03/21/22 0243 -- -- -- -- 131/87 -- 90    03/21/22 0240 -- -- -- -- -- -- 90    03/21/22 0239 -- -- -- -- -- -- 90 03/21/22 0006 97.5 (36.4) Infrared 100 18 163/94 Sitting 90        Intake & Output (last 2 days)       03/20 0701  03/21 0700 03/21 0701  03/22 0700 03/22 0701 03/23 0700    P.O.  720 360    IV Piggyback  100     Total Intake(mL/kg)  820 (6.6) 360 (2.9)    Urine (mL/kg/hr) 2000 4750 (1.6) 1400 (1.9)    Total Output 2000 4750 1400    Net -2000 -3930 -1040                 Medication Administration Report for Marcial Kruegerson as of 03/22/22 1255   Medications 03/20/22 03/21/22 03/22/22    apixaban (ELIQUIS) tablet 5 mg  Dose: 5 mg  Freq: Every 12 Hours Scheduled Route: PO  Indications of Use: ATRIAL FIBRILLATION  Start: 03/21/22 1100   Admin Instructions:   Tablet may be crushed and suspended in 60 mL of water or D5W and immediately delivered via NG tube.     1110-Given     2013-Given         0827-Given     2100            ARIPiprazole (ABILIFY) tablet 10 mg  Dose: 10 mg  Freq: Nightly Route: PO  Start: 03/21/22 2100   Admin Instructions:   Caution: Look alike/sound alike drug alert. Avoid grapefruit juice.     2013-Given          2100             ascorbic acid (VITAMIN C) tablet 500 mg  Dose: 500 mg  Freq: Daily Route: PO  Start: 03/21/22 1500     1540-Given          0827-Given             atorvastatin (LIPITOR) tablet 10 mg  Dose: 10 mg  Freq: Nightly Route: PO  Start: 03/21/22 2100   Admin Instructions:   Avoid grapefruit juice.     2013-Given          2100             baclofen (LIORESAL) tablet 30 mg  Dose: 30 mg  Freq: 4 Times Daily With Meals & Nightly Route: PO  Start: 03/21/22 1800    Admin Instructions:   Take with food if GI upset occurs.     1751-Given     2013-Given         0828-Given     1126-Given     1800       2100             carvedilol (COREG) tablet 6.25 mg  Dose: 6.25 mg  Freq: 2 Times Daily With Meals Route: PO  Start: 03/21/22 1800   Admin Instructions:   Hold for SBP<110 or HR<60  Give with food.     1751-Given          0827-Given     1800            cefTRIAXone (ROCEPHIN) in SWFI 1 gram/10ml IV PUSH syringe  Dose: 1 g  Freq: Every 24 Hours Route: IV  Indications of Use: URINARY TRACT INFECTION  Start: 03/22/22 1100   End: 03/27/22 1059   Admin Instructions:   Caution: Look alike/sound alike drug alert.  Administer IV PUSH over 3-5 minutes. Refrigerate.      1127-Given             cholecalciferol (VITAMIN D3) tablet 1,000 Units  Dose: 1,000 Units  Freq: Daily Route: PO  Start: 03/21/22 1500     1540-Given          0828-Given             dicyclomine (BENTYL) capsule 10 mg  Dose: 10 mg  Freq: 2 Times Daily Route: PO  Start: 03/21/22 2100 2013-Given          0828-Given     2100            DULoxetine (CYMBALTA) DR capsule 60 mg  Dose: 60 mg  Freq: 2 Times Daily Route: PO  Start: 03/21/22 2100   Admin Instructions:   Caution: Look alike/sound alike drug alert. Capsule may be opened and sprinkled on applesauce or apple juice. Do not crush or chew capsule.     2012-Given          0827-Given     2100            gabapentin (NEURONTIN) capsule 800 mg  Dose: 800 mg  Freq: 3 Times Daily Route: PO  Start: 03/21/22 1600   Admin Instructions:        1540-Given     2012-Given         0833-Given     1600     2100           glipizide (GLUCOTROL) tablet 5 mg  Dose: 5 mg  Freq: Daily Route: PO  Start: 03/21/22 1500   Admin Instructions:   Caution: Look alike/sound alike drug alert     1540-Given          0827-Given             insulin aspart (novoLOG) injection 0-9 Units  Dose: 0-9 Units  Freq: 3 Times Daily Before Meals Route: SC  Start: 03/21/22 1130   Admin Instructions:   Correction -  Moderate Dose.  40-60 units/day total insulin dose or average weight, on oral agents    Blood glucose 150-199 mg/dL - 2 units  Blood glucose 200-249 mg/dL - 4 units  Blood glucose 250-299 mg/dL - 6 units  Blood glucose 300-349 mg/dL - 7 units  Blood glucose 350-400 mg/dL - 8 units  Blood glucose greater than 400 mg/dL - 9 units and call provider       1117-Given     1750-Given         (0731)-Not Given     1127-Given     1730           insulin aspart (novoLOG) injection 8 Units  Dose: 8 Units  Freq: 3 Times Daily With Meals Route: SC  Start: 03/21/22 1800   Admin Instructions:        1750-Given          0828-Given     1127-Given     1800           insulin detemir (LEVEMIR) injection 15 Units  Dose: 15 Units  Freq: Nightly Route: SC  Start: 03/21/22 2100   Admin Instructions:        2013-Given          2100             Menthol-Zinc Oxide 1 application  Dose: 1 application  Freq: 3 Times Daily Route: TOP  Start: 03/21/22 1600     1542-Given     2013-Given         1126-Given     1600     2100           modafinil (PROVIGIL) tablet 200 mg  Dose: 200 mg  Freq: Daily Route: PO  Start: 03/21/22 1500   Admin Instructions:        1540-Given          0828-Given             multivitamin (THERAGRAN) tablet 1 tablet  Dose: 1 tablet  Freq: Daily Route: PO  Start: 03/21/22 1500   Admin Instructions:        1540-Given          0828-Given             pantoprazole (PROTONIX) EC tablet 40 mg  Dose: 40 mg  Freq: Every Morning Route: PO  Start: 03/22/22 0700   Admin Instructions:   Swallow whole; do not crush, split, or chew.      0501-Given     0700-Canceled Entry            sacubitril-valsartan (ENTRESTO) 24-26 MG tablet 1 tablet  Dose: 1 tablet  Freq: 2 Times Daily Route: PO  Start: 03/21/22 2100 2013-Given          0827-Given     2100            sodium chloride 0.9 % flush 10 mL  Dose: 10 mL  Freq: Every 12 Hours Scheduled Route: IV  Start: 03/21/22 1015     1110-Given     2013-Given         0828-Given     2100           Completed  Medications  Medications 03/20/22 03/21/22 03/22/22       aztreonam (AZACTAM) 2 g in sodium chloride 0.9 % 100 mL IVPB-VTB  Dose: 2 g  Freq: Once Route: IV  Start: 03/21/22 0736   End: 03/21/22 0817   Admin Instructions:   Activate vial before using.     0747-New Bag     0817-Stopped             doxycycline (MONODOX) capsule 100 mg  Dose: 100 mg  Freq: Once Route: PO  Indications of Use: PNEUMONIA  Start: 03/21/22 0532   End: 03/21/22 0555   Admin Instructions:   Take with food if GI upset occurs. Avoid taking antacids, iron, or dairy products within 2 hours of dose.     0555-Given              furosemide (LASIX) injection 40 mg  Dose: 40 mg  Freq: Once Route: IV  Start: 03/22/22 0900   End: 03/22/22 0833      0833-Given             furosemide (LASIX) injection 40 mg  Dose: 40 mg  Freq: Once Route: IV  Start: 03/21/22 1630   End: 03/21/22 1544     1544-Given              furosemide (LASIX) injection 40 mg  Dose: 40 mg  Freq: Once Route: IV  Start: 03/21/22 0402   End: 03/21/22 0408     0408-Given              iopamidol (ISOVUE-370) 76 % injection 100 mL  Dose: 100 mL  Freq: Once in Imaging Route: IV  Start: 03/21/22 0453   End: 03/21/22 0451     0451-Given              Magnesium Sulfate 4 gram infusion- Mg 1.6-1.9 mg/dL  Dose: 4 g  Freq: Once Route: IV  Start: 03/22/22 0800   End: 03/22/22 1229   Admin Instructions:   Mag level = 1.6  Replace per protocol    Mg 1.6-1.9 mg/dL. Recheck Mg level in the AM.      0829-New Bag            Discontinued Medications  Medications 03/20/22 03/21/22 03/22/22       aztreonam (AZACTAM) 2 g in sodium chloride 0.9 % 100 mL IVPB-VTB  Dose: 2 g  Freq: Every 8 Hours Route: IV  Indications of Use: PNEUMONIA  Start: 03/21/22 1500   End: 03/21/22 0926   Admin Instructions:   Activate vial before using.          cefTRIAXone (ROCEPHIN) in SWFI 1 gram/10ml IV PUSH syringe  Dose: 1 g  Freq: Every 24 Hours Route: IV  Indications of Use: PNEUMONIA  Start: 03/21/22 1100   End: 03/21/22 1532    Admin Instructions:   Caution: Look alike/sound alike drug alert.  Administer IV PUSH over 3-5 minutes. Refrigerate.     1110-Given              doxycycline (VIBRAMYCIN) 100 mg in sodium chloride 0.9 % 100 mL IVPB-VTB  Dose: 100 mg  Freq: Every 12 Hours Route: IV  Indications of Use: PNEUMONIA  Start: 03/21/22 1800   End: 03/21/22 1539   Admin Instructions:   Protect from light.          sodium chloride 0.9 % bolus 500 mL  Dose: 500 mL  Freq: Once Route: IV  Start: 03/21/22 0101   End: 03/21/22 0926     0126-Hold [C]                      Lab Results (all)     Procedure Component Value Units Date/Time    Urine Culture - Urine, Urine, Clean Catch [[id number]]  (Abnormal) Collected: 03/21/22 1109    Specimen: Urine, Clean Catch Updated: 03/22/22 1101     Urine Culture >100,000 CFU/mL Gram Negative Bacilli    POC Glucose Once [[id number]]  (Abnormal) Collected: 03/22/22 0949    Specimen: Blood Updated: 03/22/22 0959     Glucose 190 mg/dL      Comment: Meter: [id number] : [id number] chelsey shi       POC Glucose Once [[id number]]  (Normal) Collected: 03/22/22 0623    Specimen: Blood Updated: 03/22/22 0646     Glucose 123 mg/dL      Comment: Meter: [id number] : [id number] chelseymaury shi       Blood Culture - Blood, Arm, Left [[id number]]  (Normal) Collected: 03/21/22 0547    Specimen: Blood from Arm, Left Updated: 03/22/22 0603     Blood Culture No growth at 24 hours    Blood Culture - Blood, Arm, Right [[id number]]  (Normal) Collected: 03/21/22 0547    Specimen: Blood from Arm, Right Updated: 03/22/22 0603     Blood Culture No growth at 24 hours    Scan Slide [[id number]] Collected: 03/22/22 0306    Specimen: Blood Updated: 03/22/22 0509     Anisocytosis Mod/2+     Hypochromia Large/3+     Microcytes Mod/2+     Platelet Morphology Normal    CBC Auto Differential [[id number]]  (Abnormal) Collected: 03/22/22 0306    Specimen: Blood Updated: 03/22/22 0509     WBC 8.70 10*3/mm3      RBC 4.25 10*6/mm3      Hemoglobin  7.8 g/dL      Hematocrit 30.0 %      MCV 70.6 fL      MCH 18.4 pg      MCHC 26.0 g/dL      RDW 18.8 %      RDW-SD 47.7 fl      MPV 9.4 fL      Platelets 438 10*3/mm3      Neutrophil % 72.0 %      Lymphocyte % 22.0 %      Monocyte % 3.1 %      Eosinophil % 2.2 %      Basophil % 0.5 %      Immature Grans % 0.2 %      Neutrophils, Absolute 6.27 10*3/mm3      Lymphocytes, Absolute 1.91 10*3/mm3      Monocytes, Absolute 0.27 10*3/mm3      Eosinophils, Absolute 0.19 10*3/mm3      Basophils, Absolute 0.04 10*3/mm3      Immature Grans, Absolute 0.02 10*3/mm3      nRBC 0.0 /100 WBC     Comprehensive Metabolic Panel [[id number]]  (Abnormal) Collected: 03/22/22 0306    Specimen: Blood Updated: 03/22/22 0404     Glucose 151 mg/dL      BUN 16 mg/dL      Creatinine 0.57 mg/dL      Sodium 134 mmol/L      Potassium 3.7 mmol/L      Chloride 96 mmol/L      CO2 27.7 mmol/L      Calcium 8.8 mg/dL      Total Protein 7.1 g/dL      Albumin 2.76 g/dL      ALT (SGPT) 19 U/L      AST (SGOT) 22 U/L      Alkaline Phosphatase 92 U/L      Total Bilirubin 0.2 mg/dL      Globulin 4.3 gm/dL      A/G Ratio 0.6 g/dL      BUN/Creatinine Ratio 28.1     Anion Gap 10.3 mmol/L      eGFR 124.0 mL/min/1.73     Magnesium [[id number]]  (Normal) Collected: 03/22/22 0306    Specimen: Blood Updated: 03/22/22 0404     Magnesium 1.6 mg/dL     POC Glucose Once [[id number]]  (Abnormal) Collected: 03/21/22 1823    Specimen: Blood Updated: 03/21/22 1830     Glucose 172 mg/dL      Comment: Meter: [id number] : [id number] CINDY SLATER       POC Glucose Once [[id number]]  (Abnormal) Collected: 03/21/22 1700    Specimen: Blood Updated: 03/21/22 1707     Glucose 152 mg/dL      Comment: Meter: [id number] : [id number] ALLYSON SMITH       Urinalysis, Microscopic Only - Urine, Clean Catch [[id number]]  (Abnormal) Collected: 03/21/22 1109    Specimen: Urine, Clean Catch Updated: 03/21/22 1133     RBC, UA 3-5 /HPF      WBC, UA 21-30 /HPF      Bacteria, UA 3+ /HPF      Squamous  Epithelial Cells, UA None Seen /HPF      Hyaline Casts, UA None Seen /LPF      Methodology Automated Microscopy    Urinalysis With Microscopic If Indicated (No Culture) - Urine, Clean Catch [[id number]]  (Abnormal) Collected: 03/21/22 1109    Specimen: Urine, Clean Catch Updated: 03/21/22 1126     Color, UA Yellow     Appearance, UA Clear     pH, UA 6.5     Specific Gravity, UA 1.018     Glucose, UA Negative     Ketones, UA Negative     Bilirubin, UA Negative     Blood, UA Trace     Protein, UA Negative     Leuk Esterase, UA Moderate (2+)     Nitrite, UA Positive     Urobilinogen, UA 0.2 E.U./dL    POC Glucose Once [[id number]]  (Abnormal) Collected: 03/21/22 1107    Specimen: Blood Updated: 03/21/22 1117     Glucose 151 mg/dL      Comment: Meter: [id number] : [id number] chelsey shi       Procalcitonin [[id number]]  (Normal) Collected: 03/21/22 0108    Specimen: Blood from Arm, Left Updated: 03/21/22 0829     Procalcitonin 0.09 ng/mL     Lactic Acid, Plasma [[id number]]  (Normal) Collected: 03/21/22 0547    Specimen: Blood Updated: 03/21/22 0626     Lactate 1.5 mmol/L     Blood Gas, Arterial With Co-Ox [[id number]]  (Abnormal) Collected: 03/21/22 0605    Specimen: Arterial Blood Updated: 03/21/22 0606     Site Right Radial     Kalpesh's Test Positive     pH, Arterial 7.414 pH units      pCO2, Arterial 49.0 mm Hg      pO2, Arterial 68.5 mm Hg      Comment: 84 Value below reference range        HCO3, Arterial 31.3 mmol/L      Comment: 83 Value above reference range        Base Excess, Arterial 6.0 mmol/L      O2 Saturation, Arterial 93.3 %      Comment: 84 Value below reference range        Hemoglobin, Blood Gas 8.4 g/dL      Comment: 84 Value below reference range        Hematocrit, Blood Gas 25.6 %      Comment: 84 Value below reference range        Oxyhemoglobin 90.8 %      Comment: 84 Value below reference range        Methemoglobin 0.60 %      Carboxyhemoglobin 2.1 %      A-a Gradiant 68.8 mmHg      CO2 Content  32.8 mmol/L      Temperature 0.0 C      Barometric Pressure for Blood Gas 733 mmHg      Modality Nasal Cannula     FIO2 28 %      Flow Rate 2.0 lpm      Ventilator Mode NA     Note --     Collected by [id number]     Comment: Meter: [id number]     :  [id number]        pH, Temp Corrected --     pCO2, Temperature Corrected --     pO2, Temperature Corrected --    Scan Slide [[id number]] Collected: 03/21/22 0108    Specimen: Blood from Arm, Left Updated: 03/21/22 0215     Anisocytosis Mod/2+     Hypochromia Large/3+     Microcytes Slight/1+     Platelet Morphology Normal    CBC Auto Differential [[id number]]  (Abnormal) Collected: 03/21/22 0108    Specimen: Blood from Arm, Left Updated: 03/21/22 0215     WBC 8.82 10*3/mm3      RBC 4.52 10*6/mm3      Hemoglobin 8.5 g/dL      Hematocrit 32.7 %      MCV 72.3 fL      MCH 18.8 pg      MCHC 26.0 g/dL      RDW 19.2 %      RDW-SD 49.6 fl      MPV 9.1 fL      Platelets 457 10*3/mm3      Neutrophil % 71.9 %      Lymphocyte % 21.8 %      Monocyte % 3.6 %      Eosinophil % 2.2 %      Basophil % 0.3 %      Immature Grans % 0.2 %      Neutrophils, Absolute 6.34 10*3/mm3      Lymphocytes, Absolute 1.92 10*3/mm3      Monocytes, Absolute 0.32 10*3/mm3      Eosinophils, Absolute 0.19 10*3/mm3      Basophils, Absolute 0.03 10*3/mm3      Immature Grans, Absolute 0.02 10*3/mm3      nRBC 0.0 /100 WBC     COVID PRE-OP / PRE-PROCEDURE SCREENING ORDER (NO ISOLATION) - Swab, Nasopharynx [[id number]]  (Normal) Collected: 03/21/22 0109    Specimen: Swab from Nasopharynx Updated: 03/21/22 0142    Narrative:      The following orders were created for panel order COVID PRE-OP / PRE-PROCEDURE SCREENING ORDER (NO ISOLATION) - Swab, Nasopharynx.  Procedure                               Abnormality         Status                     ---------                               -----------         ------                     COVID-19 and FLU A/B PCR...[[id number]]  Normal              Final result                  Please view results for these tests on the individual orders.    COVID-19 and FLU A/B PCR - Swab, Nasopharynx [[id number]]  (Normal) Collected: 03/21/22 0109    Specimen: Swab from Nasopharynx Updated: 03/21/22 0142     COVID19 Not Detected     Influenza A PCR Not Detected     Influenza B PCR Not Detected    Comprehensive Metabolic Panel [[id number]]  (Abnormal) Collected: 03/21/22 0108    Specimen: Blood from Arm, Left Updated: 03/21/22 0142     Glucose 135 mg/dL      BUN 11 mg/dL      Creatinine 0.68 mg/dL      Sodium 136 mmol/L      Potassium 3.7 mmol/L      Chloride 101 mmol/L      CO2 27.1 mmol/L      Calcium 9.2 mg/dL      Total Protein 8.1 g/dL      Albumin 3.46 g/dL      ALT (SGPT) 20 U/L      AST (SGOT) 16 U/L      Alkaline Phosphatase 110 U/L      Total Bilirubin 0.2 mg/dL      Globulin 4.6 gm/dL      A/G Ratio 0.7 g/dL      BUN/Creatinine Ratio 16.2     Anion Gap 7.9 mmol/L      eGFR 117.5 mL/min/1.73     Troponin [[id number]]  (Normal) Collected: 03/21/22 0108    Specimen: Blood from Arm, Left Updated: 03/21/22 0142     Troponin T <0.010 ng/mL     BNP [[id number]]  (Abnormal) Collected: 03/21/22 0108    Specimen: Blood from Arm, Left Updated: 03/21/22 0139     proBNP 1,230.0 pg/mL           Imaging Results (All)     Procedure Component Value Units Date/Time    US Renal Bilateral [[id number]] Collected: 03/22/22 0841     Updated: 03/22/22 0844    Narrative:      EXAMINATION: US RENAL BILATERAL-      CLINICAL INDICATION:     concern for hydro on CT; R09.02-Hypoxemia;  J18.9-Pneumonia, unspecified organism; I50.9-Heart failure, unspecified;  N13.30-Unspecified hydronephrosis     TECHNIQUE: Multiplanar gray scale sonographic imaging of the kidneys.  Color Doppler implemented.     COMPARISON: 03/17/2022      FINDINGS:      RIGHT: The right kidney measures 13.1 cm in length. No mass,  hydronephrosis, or shadowing stone.     LEFT: The left kidney measures 12.8 cm in length. Minimal left  hydronephrosis is  noted. No shadowing stones identified.       Impression:      1. Minimal left-sided hydronephrosis.  2. No shadowing stones. Otherwise unremarkable exam.      This report was finalized on 3/22/2022 8:42 AM by Dr. Sreedhar Gray MD.       CT Chest Pulmonary Embolism [[id number]] Collected: 03/21/22 0515     Updated: 03/21/22 0518    Narrative:      CT CHEST PULMONARY EMBOLISM W CONTRAST    INDICATION:   Shortness of air. Low O2 saturation.    TECHNIQUE:   CT angiogram of the chest with IV contrast. 3-D reconstructions were obtained and reviewed.   Radiation dose reduction techniques included automated exposure control or exposure modulation based on body size. Count of known CT and cardiac nuc med studies  performed in previous 12 months: 3.     COMPARISON:   2/23/2019    FINDINGS:   There is artifact due to respiratory motion as well as body habitus. Bolus timing is suboptimal as well. No definite pulmonary embolism is identified. There is no aortic dissection. The heart is enlarged. No pericardial effusion is seen. There is a small  left pleural effusion and a moderate to large right pleural effusion. There is bilateral gynecomastia. There is mediastinal and hilar adenopathy. For example, a right hilar lymph node measures 2.1 cm. A left hilar lymph node measures 1.5 cm. Subcarinal  lymph node measures 2.2 cm. Multiple other mildly prominent mediastinal nodes are present as well. Upper abdominal images show hepatic steatosis and cholecystectomy. Additionally, there is potential left-sided hydronephrosis with a dilated upper pole  calyx on the left. This does appear changed from CT abdomen and pelvis 3/17/2022. Consider follow-up with a CT abdomen and pelvis.    Patient is status post mid to upper thoracic spinal fusion with bilateral pedicle screws. Lung windows show consolidations in the lower lobes associated with the pleural effusions which may reflect atelectasis and/or pneumonia. There are  groundglass  infiltrates in both lungs with septal thickening suggesting some pulmonary edema. Additionally, there are patchy areas of consolidation in both lungs concerning for pneumonia, particularly in the upper lobes. No pneumothorax is identified. Imaging  features are atypical or uncommonly reported for COVID-19 pneumonia. Alternative diagnoses should be considered.      Impression:        1. Technically limited exam as above. No definite pulmonary embolism. No aortic dissection.  2. Bilateral pleural effusions, right greater than left with cardiomegaly and evidence of mild pulmonary edema suggesting CHF.  3. Multifocal alveolar infiltrates in the lungs as above concerning for associated bilateral pneumonia although some component of atelectasis may be present in the lower lobes.  4. Mediastinal and bilateral hilar adenopathy, possibly reactive. Attention on 3 month follow-up chest CT is recommended.  5. Potential left hydronephrosis incompletely evaluated. Consider follow-up with a stone protocol CT abdomen and pelvis if indicated.    Signer Name: Yohan Padron MD   Signed: 3/21/2022 5:15 AM   Workstation Name: [email or web address]    [facility]    US Venous Doppler Lower Extremity Right (duplex) [[id number]] Collected: 03/21/22 0205     Updated: 03/21/22 0207    Narrative:      US Veins LE Duplex LTD RT    HISTORY:   Right lower extremity swelling.    TECHNIQUE:    Real-time ultrasound was performed of the right lower extremity utilizing spectral and color Doppler with compression and augmentation techniques.     COMPARISON:  None available.    FINDINGS:  No evidence of a right lower extremity deep vein thrombosis. The common femoral vein through the popliteal vein are widely patent. There is normal compressibility with spontaneous and phasic waveforms. No calf vein thrombus. Greater saphenous vein is not  imaged.      Impression:      No deep venous thrombus in the right lower extremity.      Signer Name: Yohan Padron MD   Signed: 3/21/2022 2:05 AM   Workstation Name: [email or web address]    [facility]          Orders (all)      Start     Ordered    22 1653  Turn Patient  Now Then Every 2 Hours         22 1654    22 1653  Use Repositioning Wedge to Position Patient  Continuous         22 1654    22 1639  Inpatient Wound APRN Consult  Once        Provider:  (Not yet assigned)    22 1638    22 1200  Vital Signs  Every 4 Hours       22 0926    22 1035  ReDs Vest  Once         22 1035    22 0958  Specialty Bed Clinitron Bri Frank  Once         22 0958    22 0957  Inpatient Diabetes Educator Consult  Once        Provider:  (Not yet assigned)    22 0957    22 0948  Inpatient Case Management  Consult  Once        Provider:  (Not yet assigned)    22 0948    22 0926  VTE Prophylaxis Not Indicated: Other: Patient Currently Anticoagulated / Receiving Prophylaxis  Once         22 0926    22 0926  Telemetry - Maintain IV Access  Continuous         22 0926    22 0926  Continuous Cardiac Monitoring  Continuous         22 0926    22 0926  May Be Off Telemetry for Tests  Continuous         22 0926    22 0926  ACLS Protocol For Life Threatening Dysrhythmias (Unless Code Status Indicates Otherwise)  Continuous         22 0926    22 0926  Notify Provider if ACLS Protocol Activated  Until Discontinued         22 0926    22 0926  Diet Regular; Consistent Carbohydrate  Diet Effective Now         22 0926    22 0758  Inpatient Admission  Once         22 0801                       Physician Progress

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
## 2022-03-22 NOTE — PHARMACY PATIENT ASSISTANCE
Pharmacy evaluated the costs of Eliquis and Entresto for patient. Eliquis was last filled on 2/28 with no copay.    Entresto was last filled on 3/1 with no copay, as well.    Thank you,    Brionna Rossi, PharmD  03/22/22  15:32 EDT

## 2022-03-22 NOTE — PLAN OF CARE
Goal Outcome Evaluation:        Problem: Adult Inpatient Plan of Care  Goal: Plan of Care Review  Outcome: Ongoing, Progressing     Problem: Adult Inpatient Plan of Care  Goal: Absence of Hospital-Acquired Illness or Injury  Intervention: Prevent Skin Injury  Recent Flowsheet Documentation  Taken 3/22/2022 0830 by aCry Kaur RN  Skin Protection: adhesive use limited     Problem: Adult Inpatient Plan of Care  Goal: Optimal Comfort and Wellbeing  Intervention: Monitor Pain and Promote Comfort  Recent Flowsheet Documentation  Taken 3/22/2022 0830 by Cary Kaur RN  Pain Management Interventions: care clustered      Patient has rested today, pain medication per mar, vital signs stable, denies any needs, will continue to monitor.

## 2022-03-23 LAB
ALBUMIN SERPL-MCNC: 2.93 G/DL (ref 3.5–5.2)
ALBUMIN/GLOB SERPL: 0.7 G/DL
ALP SERPL-CCNC: 90 U/L (ref 39–117)
ALT SERPL W P-5'-P-CCNC: 19 U/L (ref 1–41)
ANION GAP SERPL CALCULATED.3IONS-SCNC: 11.1 MMOL/L (ref 5–15)
ANISOCYTOSIS BLD QL: NORMAL
AST SERPL-CCNC: 23 U/L (ref 1–40)
BACTERIA SPEC AEROBE CULT: ABNORMAL
BACTERIA SPEC AEROBE CULT: ABNORMAL
BASOPHILS # BLD AUTO: 0.04 10*3/MM3 (ref 0–0.2)
BASOPHILS NFR BLD AUTO: 0.5 % (ref 0–1.5)
BILIRUB SERPL-MCNC: 0.2 MG/DL (ref 0–1.2)
BUN SERPL-MCNC: 16 MG/DL (ref 6–20)
BUN/CREAT SERPL: 22.2 (ref 7–25)
CALCIUM SPEC-SCNC: 8.5 MG/DL (ref 8.6–10.5)
CHLORIDE SERPL-SCNC: 97 MMOL/L (ref 98–107)
CO2 SERPL-SCNC: 26.9 MMOL/L (ref 22–29)
CREAT SERPL-MCNC: 0.72 MG/DL (ref 0.76–1.27)
DEPRECATED RDW RBC AUTO: 47.6 FL (ref 37–54)
EGFRCR SERPLBLD CKD-EPI 2021: 115.5 ML/MIN/1.73
EOSINOPHIL # BLD AUTO: 0.24 10*3/MM3 (ref 0–0.4)
EOSINOPHIL NFR BLD AUTO: 2.9 % (ref 0.3–6.2)
ERYTHROCYTE [DISTWIDTH] IN BLOOD BY AUTOMATED COUNT: 18.6 % (ref 12.3–15.4)
GLOBULIN UR ELPH-MCNC: 4.2 GM/DL
GLUCOSE BLDC GLUCOMTR-MCNC: 104 MG/DL (ref 70–130)
GLUCOSE BLDC GLUCOMTR-MCNC: 163 MG/DL (ref 70–130)
GLUCOSE BLDC GLUCOMTR-MCNC: 224 MG/DL (ref 70–130)
GLUCOSE BLDC GLUCOMTR-MCNC: 251 MG/DL (ref 70–130)
GLUCOSE SERPL-MCNC: 178 MG/DL (ref 65–99)
HCT VFR BLD AUTO: 30.7 % (ref 37.5–51)
HGB BLD-MCNC: 8 G/DL (ref 13–17.7)
HYPOCHROMIA BLD QL: NORMAL
IMM GRANULOCYTES # BLD AUTO: 0.02 10*3/MM3 (ref 0–0.05)
IMM GRANULOCYTES NFR BLD AUTO: 0.2 % (ref 0–0.5)
LYMPHOCYTES # BLD AUTO: 2.28 10*3/MM3 (ref 0.7–3.1)
LYMPHOCYTES NFR BLD AUTO: 27.1 % (ref 19.6–45.3)
MAGNESIUM SERPL-MCNC: 2.2 MG/DL (ref 1.6–2.6)
MCH RBC QN AUTO: 18.6 PG (ref 26.6–33)
MCHC RBC AUTO-ENTMCNC: 26.1 G/DL (ref 31.5–35.7)
MCV RBC AUTO: 71.4 FL (ref 79–97)
MICROCYTES BLD QL: NORMAL
MONOCYTES # BLD AUTO: 0.37 10*3/MM3 (ref 0.1–0.9)
MONOCYTES NFR BLD AUTO: 4.4 % (ref 5–12)
NEUTROPHILS NFR BLD AUTO: 5.47 10*3/MM3 (ref 1.7–7)
NEUTROPHILS NFR BLD AUTO: 64.9 % (ref 42.7–76)
NRBC BLD AUTO-RTO: 0 /100 WBC (ref 0–0.2)
PLAT MORPH BLD: NORMAL
PLATELET # BLD AUTO: 482 10*3/MM3 (ref 140–450)
PMV BLD AUTO: 9.6 FL (ref 6–12)
POTASSIUM SERPL-SCNC: 3.7 MMOL/L (ref 3.5–5.2)
PROT SERPL-MCNC: 7.1 G/DL (ref 6–8.5)
RBC # BLD AUTO: 4.3 10*6/MM3 (ref 4.14–5.8)
SODIUM SERPL-SCNC: 135 MMOL/L (ref 136–145)
WBC NRBC COR # BLD: 8.42 10*3/MM3 (ref 3.4–10.8)

## 2022-03-23 PROCEDURE — 85007 BL SMEAR W/DIFF WBC COUNT: CPT

## 2022-03-23 PROCEDURE — 83735 ASSAY OF MAGNESIUM: CPT | Performed by: PHYSICIAN ASSISTANT

## 2022-03-23 PROCEDURE — 97162 PT EVAL MOD COMPLEX 30 MIN: CPT

## 2022-03-23 PROCEDURE — 63710000001 INSULIN DETEMIR PER 5 UNITS: Performed by: INTERNAL MEDICINE

## 2022-03-23 PROCEDURE — 63710000001 INSULIN ASPART PER 5 UNITS: Performed by: INTERNAL MEDICINE

## 2022-03-23 PROCEDURE — 85025 COMPLETE CBC W/AUTO DIFF WBC: CPT

## 2022-03-23 PROCEDURE — 99233 SBSQ HOSP IP/OBS HIGH 50: CPT | Performed by: PHYSICIAN ASSISTANT

## 2022-03-23 PROCEDURE — 25010000002 FUROSEMIDE PER 20 MG: Performed by: PHYSICIAN ASSISTANT

## 2022-03-23 PROCEDURE — 80053 COMPREHEN METABOLIC PANEL: CPT

## 2022-03-23 PROCEDURE — 94799 UNLISTED PULMONARY SVC/PX: CPT

## 2022-03-23 PROCEDURE — 25010000002 CEFTRIAXONE PER 250 MG: Performed by: INTERNAL MEDICINE

## 2022-03-23 PROCEDURE — 25010000002 CEFEPIME PER 500 MG: Performed by: INTERNAL MEDICINE

## 2022-03-23 PROCEDURE — 82962 GLUCOSE BLOOD TEST: CPT

## 2022-03-23 RX ORDER — FUROSEMIDE 10 MG/ML
40 INJECTION INTRAMUSCULAR; INTRAVENOUS ONCE
Status: COMPLETED | OUTPATIENT
Start: 2022-03-23 | End: 2022-03-23

## 2022-03-23 RX ORDER — GUAIFENESIN 600 MG/1
600 TABLET, EXTENDED RELEASE ORAL EVERY 12 HOURS SCHEDULED
Status: DISCONTINUED | OUTPATIENT
Start: 2022-03-23 | End: 2022-03-24 | Stop reason: HOSPADM

## 2022-03-23 RX ADMIN — CARVEDILOL 6.25 MG: 6.25 TABLET, FILM COATED ORAL at 17:35

## 2022-03-23 RX ADMIN — BACLOFEN 30 MG: 10 TABLET ORAL at 20:07

## 2022-03-23 RX ADMIN — INSULIN DETEMIR 15 UNITS: 100 INJECTION, SOLUTION SUBCUTANEOUS at 20:08

## 2022-03-23 RX ADMIN — CARVEDILOL 6.25 MG: 6.25 TABLET, FILM COATED ORAL at 09:14

## 2022-03-23 RX ADMIN — SACUBITRIL AND VALSARTAN 1 TABLET: 24; 26 TABLET, FILM COATED ORAL at 09:15

## 2022-03-23 RX ADMIN — GABAPENTIN 800 MG: 400 CAPSULE ORAL at 09:14

## 2022-03-23 RX ADMIN — Medication 10 ML: at 20:07

## 2022-03-23 RX ADMIN — ATORVASTATIN CALCIUM 10 MG: 10 TABLET, FILM COATED ORAL at 20:07

## 2022-03-23 RX ADMIN — Medication 1 TABLET: at 09:14

## 2022-03-23 RX ADMIN — GUAIFENESIN 600 MG: 600 TABLET, EXTENDED RELEASE ORAL at 11:43

## 2022-03-23 RX ADMIN — CEFTRIAXONE 1 G: 10 INJECTION, POWDER, FOR SOLUTION INTRAVENOUS at 11:43

## 2022-03-23 RX ADMIN — DICYCLOMINE HYDROCHLORIDE 20 MG: 10 CAPSULE ORAL at 20:07

## 2022-03-23 RX ADMIN — OXYCODONE HYDROCHLORIDE AND ACETAMINOPHEN 500 MG: 500 TABLET ORAL at 09:15

## 2022-03-23 RX ADMIN — BACLOFEN 30 MG: 10 TABLET ORAL at 09:14

## 2022-03-23 RX ADMIN — DULOXETINE HYDROCHLORIDE 60 MG: 60 CAPSULE, DELAYED RELEASE ORAL at 09:15

## 2022-03-23 RX ADMIN — APIXABAN 5 MG: 5 TABLET, FILM COATED ORAL at 20:07

## 2022-03-23 RX ADMIN — GUAIFENESIN 600 MG: 600 TABLET, EXTENDED RELEASE ORAL at 20:07

## 2022-03-23 RX ADMIN — BACLOFEN 30 MG: 10 TABLET ORAL at 11:43

## 2022-03-23 RX ADMIN — Medication 1 APPLICATION: at 15:50

## 2022-03-23 RX ADMIN — PANTOPRAZOLE SODIUM 40 MG: 40 TABLET, DELAYED RELEASE ORAL at 04:37

## 2022-03-23 RX ADMIN — DICYCLOMINE HYDROCHLORIDE 20 MG: 10 CAPSULE ORAL at 09:14

## 2022-03-23 RX ADMIN — MODAFINIL 200 MG: 100 TABLET ORAL at 09:14

## 2022-03-23 RX ADMIN — APIXABAN 5 MG: 5 TABLET, FILM COATED ORAL at 09:14

## 2022-03-23 RX ADMIN — CHOLECALCIFEROL TAB 10 MCG (400 UNIT) 1000 UNITS: 10 TAB at 09:15

## 2022-03-23 RX ADMIN — CEFEPIME HYDROCHLORIDE 2 G: 2 INJECTION, POWDER, FOR SOLUTION INTRAVENOUS at 20:06

## 2022-03-23 RX ADMIN — DICYCLOMINE HYDROCHLORIDE 20 MG: 10 CAPSULE ORAL at 11:43

## 2022-03-23 RX ADMIN — GABAPENTIN 800 MG: 400 CAPSULE ORAL at 20:07

## 2022-03-23 RX ADMIN — Medication 1 APPLICATION: at 09:15

## 2022-03-23 RX ADMIN — GLIPIZIDE 5 MG: 5 TABLET ORAL at 09:15

## 2022-03-23 RX ADMIN — DICYCLOMINE HYDROCHLORIDE 20 MG: 10 CAPSULE ORAL at 17:35

## 2022-03-23 RX ADMIN — Medication 1 APPLICATION: at 20:07

## 2022-03-23 RX ADMIN — INSULIN ASPART 2 UNITS: 100 INJECTION, SOLUTION INTRAVENOUS; SUBCUTANEOUS at 09:18

## 2022-03-23 RX ADMIN — INSULIN ASPART 8 UNITS: 100 INJECTION, SOLUTION INTRAVENOUS; SUBCUTANEOUS at 09:19

## 2022-03-23 RX ADMIN — SACUBITRIL AND VALSARTAN 1 TABLET: 24; 26 TABLET, FILM COATED ORAL at 20:07

## 2022-03-23 RX ADMIN — FUROSEMIDE 40 MG: 10 INJECTION, SOLUTION INTRAVENOUS at 11:43

## 2022-03-23 RX ADMIN — INSULIN ASPART 8 UNITS: 100 INJECTION, SOLUTION INTRAVENOUS; SUBCUTANEOUS at 11:43

## 2022-03-23 RX ADMIN — ALBUTEROL SULFATE 2.5 MG: 2.5 SOLUTION RESPIRATORY (INHALATION) at 03:27

## 2022-03-23 RX ADMIN — CEFEPIME HYDROCHLORIDE 2 G: 2 INJECTION, POWDER, FOR SOLUTION INTRAVENOUS at 15:50

## 2022-03-23 RX ADMIN — INSULIN ASPART 4 UNITS: 100 INJECTION, SOLUTION INTRAVENOUS; SUBCUTANEOUS at 11:43

## 2022-03-23 RX ADMIN — BACLOFEN 30 MG: 10 TABLET ORAL at 17:35

## 2022-03-23 RX ADMIN — DULOXETINE HYDROCHLORIDE 60 MG: 60 CAPSULE, DELAYED RELEASE ORAL at 20:06

## 2022-03-23 RX ADMIN — GABAPENTIN 800 MG: 400 CAPSULE ORAL at 15:50

## 2022-03-23 RX ADMIN — ARIPIPRAZOLE 10 MG: 10 TABLET ORAL at 20:06

## 2022-03-23 RX ADMIN — Medication 10 ML: at 09:15

## 2022-03-23 NOTE — PLAN OF CARE
Goal Outcome Evaluation:  Progress: no change   Pt resting in bed, eyes closed, appears asleep. Pt has tolerated all interventions. No complaints/concerns. No acute distress noted. Will continue to follow the plan of care.

## 2022-03-23 NOTE — CASE MANAGEMENT/SOCIAL WORK
Discharge Planning Assessment   Fabricio     Patient Name: Ken Krueger  MRN: [id number]  Today's Date: 3/23/2022    Admit Date: 3/21/2022       Discharge Plan     Row Name 03/23/22 1443       Plan    Plan Pt admitted on 3/21/22.  Pt lives at home with Brother Pineda and plans to return home at discharge.  Pt currently utilizes [facility].  [facility] will need a new referral with face to face at discharge.  Pt currently  utilizes hospital bed with trapeze bar and wheelchair via unknown provider.  Pt's PCP is Franchesca Hodges.  SS will follow.                  HUMPHREY Lee
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
## 2022-03-23 NOTE — PLAN OF CARE
Goal Outcome Evaluation:  Plan of Care Reviewed With: patient        Progress: no change  Outcome Evaluation: Patient currently resting in bed. VS stable. He has not had any complaints this shift. Wound care done. No distress noted. Will continue to monitor and follow plan of care.
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
## 2022-03-24 ENCOUNTER — READMISSION MANAGEMENT (OUTPATIENT)
Dept: CALL CENTER | Facility: HOSPITAL | Age: 47
End: 2022-03-24

## 2022-03-24 VITALS
TEMPERATURE: 98.2 F | HEIGHT: 71 IN | BODY MASS INDEX: 39.07 KG/M2 | WEIGHT: 279.1 LBS | HEART RATE: 86 BPM | RESPIRATION RATE: 20 BRPM | OXYGEN SATURATION: 93 % | SYSTOLIC BLOOD PRESSURE: 136 MMHG | DIASTOLIC BLOOD PRESSURE: 76 MMHG

## 2022-03-24 LAB
ANION GAP SERPL CALCULATED.3IONS-SCNC: 7.7 MMOL/L (ref 5–15)
ANISOCYTOSIS BLD QL: NORMAL
BASOPHILS # BLD AUTO: 0.03 10*3/MM3 (ref 0–0.2)
BASOPHILS NFR BLD AUTO: 0.4 % (ref 0–1.5)
BH CV ECHO MEAS - AO ROOT DIAM: 2.7 CM
BH CV ECHO MEAS - EDV(CUBED): 389 ML
BH CV ECHO MEAS - EDV(MOD-SP4): 111 ML
BH CV ECHO MEAS - EF(MOD-SP4): 45.9 %
BH CV ECHO MEAS - ESV(CUBED): 227 ML
BH CV ECHO MEAS - ESV(MOD-SP4): 60 ML
BH CV ECHO MEAS - FS: 16.4 %
BH CV ECHO MEAS - IVS/LVPW: 0.73 CM
BH CV ECHO MEAS - IVSD: 1.1 CM
BH CV ECHO MEAS - LA DIMENSION: 4.1 CM
BH CV ECHO MEAS - LV DIASTOLIC VOL/BSA (35-75): 46.1 CM2
BH CV ECHO MEAS - LV MASS(C)D: 484.2 GRAMS
BH CV ECHO MEAS - LV SYSTOLIC VOL/BSA (12-30): 24.9 CM2
BH CV ECHO MEAS - LVIDD: 7.3 CM
BH CV ECHO MEAS - LVIDS: 6.1 CM
BH CV ECHO MEAS - LVOT AREA: 3.1 CM2
BH CV ECHO MEAS - LVOT DIAM: 2 CM
BH CV ECHO MEAS - LVPWD: 1.5 CM
BH CV ECHO MEAS - MV A MAX VEL: 61.8 CM/SEC
BH CV ECHO MEAS - MV E MAX VEL: 97.9 CM/SEC
BH CV ECHO MEAS - MV E/A: 1.58
BH CV ECHO MEAS - PA ACC TIME: 0.14 SEC
BH CV ECHO MEAS - PA PR(ACCEL): 15.5 MMHG
BH CV ECHO MEAS - RAP SYSTOLE: 10 MMHG
BH CV ECHO MEAS - RVSP: 34.6 MMHG
BH CV ECHO MEAS - SI(MOD-SP4): 21.2 ML/M2
BH CV ECHO MEAS - SV(MOD-SP4): 51 ML
BH CV ECHO MEAS - TR MAX PG: 24.6 MMHG
BH CV ECHO MEAS - TR MAX VEL: 248 CM/SEC
BUN SERPL-MCNC: 20 MG/DL (ref 6–20)
BUN/CREAT SERPL: 32.3 (ref 7–25)
CALCIUM SPEC-SCNC: 8.6 MG/DL (ref 8.6–10.5)
CHLORIDE SERPL-SCNC: 97 MMOL/L (ref 98–107)
CO2 SERPL-SCNC: 28.3 MMOL/L (ref 22–29)
CREAT SERPL-MCNC: 0.62 MG/DL (ref 0.76–1.27)
DEPRECATED RDW RBC AUTO: 47.6 FL (ref 37–54)
EGFRCR SERPLBLD CKD-EPI 2021: 120.9 ML/MIN/1.73
EOSINOPHIL # BLD AUTO: 0.23 10*3/MM3 (ref 0–0.4)
EOSINOPHIL NFR BLD AUTO: 3 % (ref 0.3–6.2)
ERYTHROCYTE [DISTWIDTH] IN BLOOD BY AUTOMATED COUNT: 18.6 % (ref 12.3–15.4)
GLUCOSE BLDC GLUCOMTR-MCNC: 139 MG/DL (ref 70–130)
GLUCOSE BLDC GLUCOMTR-MCNC: 214 MG/DL (ref 70–130)
GLUCOSE SERPL-MCNC: 200 MG/DL (ref 65–99)
HCT VFR BLD AUTO: 30.3 % (ref 37.5–51)
HGB BLD-MCNC: 7.9 G/DL (ref 13–17.7)
HYPOCHROMIA BLD QL: NORMAL
IMM GRANULOCYTES # BLD AUTO: 0.01 10*3/MM3 (ref 0–0.05)
IMM GRANULOCYTES NFR BLD AUTO: 0.1 % (ref 0–0.5)
LEFT ATRIUM VOLUME INDEX: 23.2 ML/M2
LYMPHOCYTES # BLD AUTO: 1.37 10*3/MM3 (ref 0.7–3.1)
LYMPHOCYTES NFR BLD AUTO: 17.7 % (ref 19.6–45.3)
MAXIMAL PREDICTED HEART RATE: 176 BPM
MCH RBC QN AUTO: 18.8 PG (ref 26.6–33)
MCHC RBC AUTO-ENTMCNC: 26.1 G/DL (ref 31.5–35.7)
MCV RBC AUTO: 72 FL (ref 79–97)
MICROCYTES BLD QL: NORMAL
MONOCYTES # BLD AUTO: 0.37 10*3/MM3 (ref 0.1–0.9)
MONOCYTES NFR BLD AUTO: 4.8 % (ref 5–12)
NEUTROPHILS NFR BLD AUTO: 5.71 10*3/MM3 (ref 1.7–7)
NEUTROPHILS NFR BLD AUTO: 74 % (ref 42.7–76)
NRBC BLD AUTO-RTO: 0 /100 WBC (ref 0–0.2)
PLAT MORPH BLD: NORMAL
PLATELET # BLD AUTO: 397 10*3/MM3 (ref 140–450)
PMV BLD AUTO: 9.3 FL (ref 6–12)
POTASSIUM SERPL-SCNC: 4 MMOL/L (ref 3.5–5.2)
QT INTERVAL: 408 MS
QTC INTERVAL: 479 MS
RBC # BLD AUTO: 4.21 10*6/MM3 (ref 4.14–5.8)
SODIUM SERPL-SCNC: 133 MMOL/L (ref 136–145)
STRESS TARGET HR: 150 BPM
WBC NRBC COR # BLD: 7.72 10*3/MM3 (ref 3.4–10.8)

## 2022-03-24 PROCEDURE — 63710000001 INSULIN ASPART PER 5 UNITS: Performed by: INTERNAL MEDICINE

## 2022-03-24 PROCEDURE — 94799 UNLISTED PULMONARY SVC/PX: CPT

## 2022-03-24 PROCEDURE — 85007 BL SMEAR W/DIFF WBC COUNT: CPT | Performed by: PHYSICIAN ASSISTANT

## 2022-03-24 PROCEDURE — 82962 GLUCOSE BLOOD TEST: CPT

## 2022-03-24 PROCEDURE — 80048 BASIC METABOLIC PNL TOTAL CA: CPT | Performed by: PHYSICIAN ASSISTANT

## 2022-03-24 PROCEDURE — 25010000002 CEFEPIME PER 500 MG: Performed by: INTERNAL MEDICINE

## 2022-03-24 PROCEDURE — 85025 COMPLETE CBC W/AUTO DIFF WBC: CPT | Performed by: PHYSICIAN ASSISTANT

## 2022-03-24 PROCEDURE — 99239 HOSP IP/OBS DSCHRG MGMT >30: CPT | Performed by: PHYSICIAN ASSISTANT

## 2022-03-24 PROCEDURE — 93005 ELECTROCARDIOGRAM TRACING: CPT | Performed by: PHYSICIAN ASSISTANT

## 2022-03-24 RX ORDER — SULFAMETHOXAZOLE AND TRIMETHOPRIM 800; 160 MG/1; MG/1
1 TABLET ORAL 2 TIMES DAILY
Qty: 11 TABLET | Refills: 0 | Status: SHIPPED | OUTPATIENT
Start: 2022-03-24 | End: 2022-03-30

## 2022-03-24 RX ORDER — FUROSEMIDE 20 MG/1
20 TABLET ORAL DAILY
Qty: 30 TABLET | Refills: 0 | Status: SHIPPED | OUTPATIENT
Start: 2022-03-24 | End: 2022-09-01 | Stop reason: HOSPADM

## 2022-03-24 RX ORDER — DICYCLOMINE HYDROCHLORIDE 10 MG/1
20 CAPSULE ORAL 4 TIMES DAILY
Qty: 240 CAPSULE | Refills: 0 | Status: ON HOLD | OUTPATIENT
Start: 2022-03-24 | End: 2022-12-02 | Stop reason: SDUPTHER

## 2022-03-24 RX ORDER — FUROSEMIDE 20 MG/1
20 TABLET ORAL 2 TIMES DAILY
Qty: 30 TABLET | Refills: 0 | Status: SHIPPED | OUTPATIENT
Start: 2022-03-24 | End: 2022-03-24 | Stop reason: SDUPTHER

## 2022-03-24 RX ORDER — ACETAMINOPHEN 325 MG/1
650 TABLET ORAL EVERY 6 HOURS PRN
Status: DISCONTINUED | OUTPATIENT
Start: 2022-03-24 | End: 2022-03-24 | Stop reason: HOSPADM

## 2022-03-24 RX ORDER — PANTOPRAZOLE SODIUM 40 MG/1
40 TABLET, DELAYED RELEASE ORAL EVERY MORNING
Start: 2022-03-24 | End: 2022-08-25

## 2022-03-24 RX ORDER — METHENAMINE HIPPURATE 1000 MG/1
TABLET ORAL
Status: ON HOLD
Start: 2022-03-24 | End: 2022-12-10

## 2022-03-24 RX ADMIN — Medication 1 APPLICATION: at 09:28

## 2022-03-24 RX ADMIN — INSULIN ASPART 8 UNITS: 100 INJECTION, SOLUTION INTRAVENOUS; SUBCUTANEOUS at 12:21

## 2022-03-24 RX ADMIN — Medication 10 ML: at 09:28

## 2022-03-24 RX ADMIN — DICYCLOMINE HYDROCHLORIDE 20 MG: 10 CAPSULE ORAL at 12:20

## 2022-03-24 RX ADMIN — CEFEPIME HYDROCHLORIDE 2 G: 2 INJECTION, POWDER, FOR SOLUTION INTRAVENOUS at 05:11

## 2022-03-24 RX ADMIN — APIXABAN 5 MG: 5 TABLET, FILM COATED ORAL at 09:27

## 2022-03-24 RX ADMIN — GUAIFENESIN 600 MG: 600 TABLET, EXTENDED RELEASE ORAL at 09:27

## 2022-03-24 RX ADMIN — INSULIN ASPART 4 UNITS: 100 INJECTION, SOLUTION INTRAVENOUS; SUBCUTANEOUS at 12:20

## 2022-03-24 RX ADMIN — BACLOFEN 30 MG: 10 TABLET ORAL at 12:20

## 2022-03-24 RX ADMIN — SACUBITRIL AND VALSARTAN 1 TABLET: 24; 26 TABLET, FILM COATED ORAL at 09:27

## 2022-03-24 RX ADMIN — OXYCODONE HYDROCHLORIDE AND ACETAMINOPHEN 500 MG: 500 TABLET ORAL at 09:27

## 2022-03-24 RX ADMIN — ACETAMINOPHEN 650 MG: 325 TABLET ORAL at 00:50

## 2022-03-24 RX ADMIN — BACLOFEN 30 MG: 10 TABLET ORAL at 09:27

## 2022-03-24 RX ADMIN — CHOLECALCIFEROL TAB 10 MCG (400 UNIT) 1000 UNITS: 10 TAB at 09:27

## 2022-03-24 RX ADMIN — PANTOPRAZOLE SODIUM 40 MG: 40 TABLET, DELAYED RELEASE ORAL at 05:11

## 2022-03-24 RX ADMIN — DICYCLOMINE HYDROCHLORIDE 20 MG: 10 CAPSULE ORAL at 09:27

## 2022-03-24 RX ADMIN — GLIPIZIDE 5 MG: 5 TABLET ORAL at 09:27

## 2022-03-24 RX ADMIN — MODAFINIL 200 MG: 100 TABLET ORAL at 09:28

## 2022-03-24 RX ADMIN — Medication 1 TABLET: at 09:27

## 2022-03-24 RX ADMIN — GABAPENTIN 800 MG: 400 CAPSULE ORAL at 09:28

## 2022-03-24 RX ADMIN — DULOXETINE HYDROCHLORIDE 60 MG: 60 CAPSULE, DELAYED RELEASE ORAL at 09:27

## 2022-03-24 RX ADMIN — CARVEDILOL 6.25 MG: 6.25 TABLET, FILM COATED ORAL at 09:27

## 2022-03-24 NOTE — PLAN OF CARE
Goal Outcome Evaluation:   Pt resting in bed at this time. Complaints of headache with PRN medication given with relief noted. No s/s of distress. Will continue to follow plan of care.

## 2022-03-24 NOTE — DISCHARGE INSTR - APPOINTMENTS
Social  worker    called  and  stated  that   fausto macias  will  make  home  visit  and  has  an  an  apt  alex christian  for April 6  at at  2  pm
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
## 2022-03-24 NOTE — NURSING NOTE
Pt being discharged home today with HH. SHAHNAZ Tran called report. Discharge is complete. SHAHNAZ Tran made aware.

## 2022-03-24 NOTE — CASE MANAGEMENT/SOCIAL WORK
Discharge Planning Assessment   Fabricio     Patient Name: Ken Krueger  MRN: [id number]  Today's Date: 3/24/2022    Admit Date: 3/21/2022     Discharge Needs Assessment    No documentation.                Discharge Plan     Row Name 03/24/22 1154       Plan    Final Discharge Disposition Code 06 - home with home health care    Final Note Pt is being discharged home with physician ordered home health services. SS faxed new  referral to [facility] home health fax [phone]. SS awaiting call back from Pt's PCP, Franchesca Hodges to verify if she will continue to follow Pt at discharge.    13:33pm: SS confirmed with PCP Franchesca Hodges, APRN. [phone] who states she is willing to pick Pt back up as a patient.  provided nurse with report number [facility] [phone].     13:56pm: SS spoke with Pt's POA/ brother, Pineda who is aware and agreeable to discharge. POA to transport via private vehicle.                 Evelyn Deras, BSW

## 2022-03-24 NOTE — DISCHARGE PLACEMENT REQUEST
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 (t) = Taken By, (c) = Cosigned By    Initials Name Provider Type    Heike Herring, PT Physical Therapist              Therapy Charges for Today     Code Description Service Date Service Provider Modifiers Qty    15422995487 HC PT EVAL MOD COMPLEXITY 4 3/23/2022 Heike Drake, PT GP 1               Heike Drake, JAMI  3/23/2022      Electronically signed by Heike Drake PT at 03/23/22 1636          Occupational Therapy Notes (most recent note)      Any Reed OT at 03/23/22 1232        Patient declined stating movement caused abdominal cramping.     Electronically signed by Any Reed OT at 03/23/22 1232       Discharge Summary    No notes of this type exist for this encounter.         Discharge Order (From admission, onward)     Start     Ordered    03/24/22 1028  Discharge patient  Once        Expected Discharge Date: 03/24/22    Expected Discharge Time: Morning    Discharge Disposition: Home or Self Care     Physician of Record for Attribution - Please select from Treatment Team: ALICE COOK [[id number]]    Review needed by CMO to determine Physician of Record: No       Question Answer Comment   Physician of Record for Attribution - Please select from Treatment Team ALICE COOK    Review needed by CMO to determine Physician of Record No        03/24/22 1103

## 2022-03-24 NOTE — NURSING NOTE
Pt D/C'd per Dr. Adrian. Pt has tolerated all interventions. No complaints/concerns. Called report to Home Health Nurse TREVIN Wong. IV and Telemetry D/C'd. No acute distress noted. Will continue to provide care until pt off unit.

## 2022-03-24 NOTE — CONSULTS
"Diabetes Education  Assessment/Teaching    Patient Name:  Ken Krueger  YOB: 1975  MRN: [id number]  Admit Date:  3/21/2022      Assessment Date:  3/24/2022  Flowsheet Row Most Recent Value   General Information     Height 180.3 cm (71\")   Height Method Stated   Weight --  [bed scale gave error message when trying to weigh]   Weight Method Bed scale   Pregnancy Assessment    Diabetes History    Education Preferences    Nutrition Information    Assessment Topics    DM Goals           Flowsheet Row Most Recent Value   DM Education Needs    Meter Has own   Frequency of Testing 3 times a day   Medication Insulin, Oral   Problem Solving Hypoglycemia, Hyperglycemia, Sick days, Signs, Symptoms, Treatment   Reducing Risks Cardiovascular, Immunizations, Foot care, Dental exam, Eye exam, Blood pressure, Lipids, A1C testing, Neuropathy, Retinopathy   Healthy Eating Basic meal plan provided   Healthy Coping Appropriate   Discharge Plan Home, Follow-up with PCP   Motivation Moderate   Teaching Method Explanation, Discussion, Handouts   Patient Response Verbalized understanding            Other Comments:  A1C 10.8 Patient did not wear a mask. Patient was seen, educated and received ADA handouts on diet, checking blood glucose, taking medication as prescribed, checking foot daily and S/S of hypo/hyperglycemia. Patient was educated on sick rule days. Patient had no questions or concerns. Please re-consult or call for concerns or questions. Thank you.      Electronically signed by:  Cary Kaur RN  03/24/22 16:28 EDT  "

## 2022-03-24 NOTE — DISCHARGE SUMMARY
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 symptoms or concerns.          Discharge Follow-up with PCP   As directed       Currently Documented PCP:    Franchesca Hernandez APRN    PCP Phone Number:    [phone]     Follow Up Details: 1 week.             Attestation: I personally discussed the patient's hospital course, disposition, discharge planning, and discharge medications with attending physician, Brannon Gooden MD, prior to time of discharge. I have also discussed with RN, Marc, prior to discharge.     Franchesca Landers PA-C  03/24/22  11:06 EDT    Time to complete discharge: >30 minutes.     Please send a copy of this dictation to the following providers:  Franchesca Hernandez APRN

## 2022-03-25 NOTE — OUTREACH NOTE
Prep Survey    Flowsheet Row Responses   [facility] facility patient discharged from? [facility]   Is LACE score < 7 ? No   Emergency Room discharge w/ pulse ox? No   Eligibility Readm Mgmt   Discharge diagnosis Hypoxia   Does the patient have one of the following disease processes/diagnoses(primary or secondary)? CHF   Does the patient have Home health ordered? No   Is there a DME ordered? No   Comments regarding appointments see AVS   Medication alerts for this patient see AVS   Prep survey completed? Yes          JENS LIMA - Registered Nurse

## 2022-03-25 NOTE — PAYOR COMM NOTE
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 in an acute care facility and will not continue treatment after discharge.    Follow-up provider: FRANCHESCA HERNANDEZ [[id number]]    Reason/Clinical Findings: Paraplegic. Multiple decubitis ulcers.    Describe mobility limitations that make leaving home difficult: Paraplegic. Multiple decubitis ulcers.    Nursing/Therapeutic Services Requested: Skilled Nursing Physical Therapy Occupational Therapy    Skilled nursing orders: Wound care dressing/changes    Frequency: 1 Week 1         Call MD With Problems / Concerns   As directed      Instructions: Contact PCP or return to the ED with recurrent symptoms or concerns.    Order Comments: Instructions: Contact PCP or return to the ED with recurrent symptoms or concerns.          Discharge Follow-up with PCP   As directed       Currently Documented PCP:    Franchesca Hernandez APRN    PCP Phone Number:    [phone]     Follow Up Details: 1 week.            Follow-up Information     Franchesca Hernandez APRN .    Specialty: Nurse Practitioner  Why: 1 week.  Contact  information:  [address]  [address]  [facility] [address]  [phone]                         Additional Instructions for the Follow-ups that You Need to Schedule     Ambulatory Referral to Home Health (Hospital)   As directed      Face to Face Visit Date: 3/24/2022    Follow-up provider for Plan of Care?: I treated the patient in an acute care facility and will not continue treatment after discharge.    Follow-up provider: GLORIA HERNANDEZ [[id number]]    Reason/Clinical Findings: Paraplegic. Multiple decubitis ulcers.    Describe mobility limitations that make leaving home difficult: Paraplegic. Multiple decubitis ulcers.    Nursing/Therapeutic Services Requested: Skilled Nursing Physical Therapy Occupational Therapy    Skilled nursing orders: Wound care dressing/changes    Frequency: 1 Week 1         Call MD With Problems / Concerns   As directed      Instructions: Contact PCP or return to the ED with recurrent symptoms or concerns.    Order Comments: Instructions: Contact PCP or return to the ED with recurrent symptoms or concerns.          Discharge Follow-up with PCP   As directed       Currently Documented PCP:    Gloria Hernandez APRN    PCP Phone Number:    [phone]     Follow Up Details: 1 week.             Attestation: I personally discussed the patient's hospital course, disposition, discharge planning, and discharge medications with attending physician, Brannon Gooden MD, prior to time of discharge. I have also discussed with RN, Marc, prior to discharge.     Gloria Landers PA-C  03/24/22  11:06 EDT    Time to complete discharge: >30 minutes.     Please send a copy of this dictation to the following providers:  Gloria Hernandez APRN      Electronically signed by Gloria Landers PA at 03/24/22 1985

## 2022-03-26 LAB
BACTERIA SPEC AEROBE CULT: NORMAL
BACTERIA SPEC AEROBE CULT: NORMAL

## 2022-03-29 ENCOUNTER — READMISSION MANAGEMENT (OUTPATIENT)
Dept: CALL CENTER | Facility: HOSPITAL | Age: 47
End: 2022-03-29

## 2022-03-29 NOTE — OUTREACH NOTE
CHF Week 1 Survey    Flowsheet Row Responses   [facility] patient discharged from? [facility]   Does the patient have one of the following disease processes/diagnoses(primary or secondary)? CHF   CHF Week 1 attempt successful? No   Unsuccessful attempts Attempt 1          CHRISTIAN YOUNG - Licensed Nurse

## 2022-04-04 ENCOUNTER — READMISSION MANAGEMENT (OUTPATIENT)
Dept: CALL CENTER | Facility: HOSPITAL | Age: 47
End: 2022-04-04

## 2022-04-04 NOTE — OUTREACH NOTE
CHF Week 2 Survey    Flowsheet Row Responses   [facility] patient discharged from? [facility]   Does the patient have one of the following disease processes/diagnoses(primary or secondary)? CHF   Week 2 attempt successful? Yes   Call start time 1518   Call end time 1522   Discharge diagnosis Hypoxia   Medication alerts for this patient finished abx   Meds reviewed with patient/caregiver? Yes   Is the patient having any side effects they believe may be caused by any medication additions or changes? No   Does the patient have all medications ordered at discharge? Yes   Is the patient taking all medications as directed (includes completed medication regime)? Yes   Comments regarding PCP Pt's PCP does home visits.   Has the patient kept scheduled appointments due by today? Yes   DME comments has home oxygen for sleeping uses with CPAP   Pulse Ox monitoring Intermittent   Pulse Ox device source Patient   O2 Sat comments RA- 94-99%    O2 Sat: education provided Sat levels, Monitoring frequency, When to seek care   Psychosocial issues? No   Comments Monitoring gluc 100-250,  RLL edema present and also in abdomen. SOA present,    What is the patient's perception of their health status since discharge? Improving   Nursing interventions Nurse provided patient education   Is the patient weighing daily? No   Does the patient have scales? No   Daily weight interventions Education provided on importance of daily weight   Is the patient able to teach back Heart Failure diet management? Yes   Is the patient able to teach back Heart Failure Zones? Yes   If the patient is a current smoker, are they able to teach back resources for cessation? Not a smoker   Is the patient/caregiver able to teach back the hierarchy of who to call/visit for symptoms/problems? PCP, Specialist, Home health nurse, Urgent Care, ED, 911 Yes   CHF Week 2 call completed? Yes          ALEXANDRA LIMA - Registered Nurse

## 2022-04-06 ENCOUNTER — APPOINTMENT (OUTPATIENT)
Dept: WOUND CARE | Facility: HOSPITAL | Age: 47
End: 2022-04-06

## 2022-04-13 ENCOUNTER — READMISSION MANAGEMENT (OUTPATIENT)
Dept: CALL CENTER | Facility: HOSPITAL | Age: 47
End: 2022-04-13

## 2022-04-13 ENCOUNTER — HOSPITAL ENCOUNTER (OUTPATIENT)
Dept: WOUND CARE | Facility: HOSPITAL | Age: 47
Discharge: HOME | End: 2022-04-13
Admitting: NURSE PRACTITIONER

## 2022-04-13 VITALS
TEMPERATURE: 98 F | RESPIRATION RATE: 18 BRPM | SYSTOLIC BLOOD PRESSURE: 112 MMHG | DIASTOLIC BLOOD PRESSURE: 60 MMHG | HEART RATE: 64 BPM

## 2022-04-13 DIAGNOSIS — E11.622 DIABETIC ULCER OF LEFT ANKLE, LIMITED TO BREAKDOWN OF SKIN: ICD-10-CM

## 2022-04-13 DIAGNOSIS — L97.321 DIABETIC ULCER OF LEFT ANKLE, LIMITED TO BREAKDOWN OF SKIN: ICD-10-CM

## 2022-04-13 PROCEDURE — A9270 NON-COVERED ITEM OR SERVICE: HCPCS | Performed by: NURSE PRACTITIONER

## 2022-04-13 PROCEDURE — 63710000001 MAGIC BARRIER CREAM 60 G BOTTLE: Performed by: NURSE PRACTITIONER

## 2022-04-13 RX ADMIN — Medication 1 APPLICATION: at 14:55

## 2022-04-13 NOTE — OUTREACH NOTE
CHF Week 3 Survey    Flowsheet Row Responses   [facility] patient discharged from? [facility]   Does the patient have one of the following disease processes/diagnoses(primary or secondary)? CHF   Week 3 attempt successful? Yes   Call start time 1044   Call end time 1046   Discharge diagnosis Hypoxia   Meds reviewed with patient/caregiver? Yes   Is the patient having any side effects they believe may be caused by any medication additions or changes? No   Does the patient have all medications ordered at discharge? Yes   Is the patient taking all medications as directed (includes completed medication regime)? Yes   Comments regarding PCP Pt's PCP does home visits.   Has the patient kept scheduled appointments due by today? Yes   What is the Home health agency?  [facility]   Has home health visited the patient within 72 hours of discharge? Yes   DME comments has home oxygen for sleeping uses with CPAP   Pulse Ox monitoring Intermittent   Pulse Ox device source Patient   O2 Sat comments O2 sat 92-94% on RA   O2 Sat: education provided Sat levels, Monitoring frequency, When to seek care   Psychosocial issues? No   Did the patient receive a copy of their discharge instructions? Yes   Nursing interventions Reviewed instructions with patient   What is the patient's perception of their health status since discharge? Improving   Nursing interventions Nurse provided patient education   Is the patient weighing daily? No   Does the patient have scales? No   Daily weight interventions Education provided on importance of daily weight   Is the patient able to teach back Heart Failure diet management? Yes   Is the patient able to teach back Heart Failure Zones? Yes   Is the patient able to teach back signs and symptoms of worsening condition? (i.e. weight gain, shortness of air, etc.) Yes   If the patient is a current smoker, are they able to teach back resources for cessation? Not a smoker   Is the patient/caregiver able to teach back  the hierarchy of who to call/visit for symptoms/problems? PCP, Specialist, Home health nurse, Urgent Care, ED, 911 Yes   CHF Week 3 call completed? Yes          ALEJANDRO OLIVIER - Registered Nurse

## 2022-04-25 ENCOUNTER — READMISSION MANAGEMENT (OUTPATIENT)
Dept: CALL CENTER | Facility: HOSPITAL | Age: 47
End: 2022-04-25

## 2022-04-25 NOTE — OUTREACH NOTE
CHF Week 4 Survey    Flowsheet Row Responses   [facility]? [facility]   Does the patient have one of the following disease processes/diagnoses(primary or secondary)? CHF   Week 4 attempt successful? No          DAYA S - Registered Nurse
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
## 2022-04-27 ENCOUNTER — APPOINTMENT (OUTPATIENT)
Dept: WOUND CARE | Facility: HOSPITAL | Age: 47
End: 2022-04-27

## 2022-05-04 ENCOUNTER — HOSPITAL ENCOUNTER (OUTPATIENT)
Dept: WOUND CARE | Facility: HOSPITAL | Age: 47
Discharge: HOME | End: 2022-05-04

## 2022-05-04 VITALS
RESPIRATION RATE: 18 BRPM | DIASTOLIC BLOOD PRESSURE: 77 MMHG | TEMPERATURE: 98 F | HEART RATE: 88 BPM | SYSTOLIC BLOOD PRESSURE: 130 MMHG

## 2022-05-04 PROCEDURE — 63710000001 ZINC OXIDE 20 % OINTMENT

## 2022-05-04 PROCEDURE — 63710000001 ALUMINUM-MAGNESIUM HYDROXIDE-SIMETHICONE 400-400-40 MG/5ML SUSPENSION

## 2022-05-04 PROCEDURE — A9270 NON-COVERED ITEM OR SERVICE: HCPCS

## 2022-05-04 PROCEDURE — 63710000001 CLOTRIMAZOLE 1 % CREAM

## 2022-05-04 PROCEDURE — 63710000001 VITAMIN A & D OINTMENT
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
## 2022-06-08 ENCOUNTER — HOSPITAL ENCOUNTER (OUTPATIENT)
Dept: WOUND CARE | Facility: HOSPITAL | Age: 47
Discharge: HOME | End: 2022-06-08
Admitting: NURSE PRACTITIONER

## 2022-06-08 VITALS
SYSTOLIC BLOOD PRESSURE: 126 MMHG | HEART RATE: 90 BPM | TEMPERATURE: 97.3 F | DIASTOLIC BLOOD PRESSURE: 72 MMHG | RESPIRATION RATE: 17 BRPM

## 2022-06-08 DIAGNOSIS — L89.002 PRESSURE INJURY OF ELBOW, STAGE 2, UNSPECIFIED LATERALITY: Primary | ICD-10-CM

## 2022-06-08 DIAGNOSIS — L89.310 PRESSURE INJURY OF RIGHT BUTTOCK, UNSTAGEABLE: ICD-10-CM

## 2022-06-08 PROCEDURE — 97602 WOUND(S) CARE NON-SELECTIVE: CPT

## 2022-06-08 RX ORDER — SODIUM HYPOCHLORITE 1.25 MG/ML
1 SOLUTION TOPICAL AS NEEDED
Status: CANCELLED | OUTPATIENT
Start: 2022-09-28

## 2022-06-08 RX ORDER — SODIUM HYPOCHLORITE 1.25 MG/ML
1 SOLUTION TOPICAL AS NEEDED
Status: CANCELLED | OUTPATIENT
Start: 2022-06-08

## 2022-06-08 RX ORDER — LIDOCAINE HYDROCHLORIDE 20 MG/ML
JELLY TOPICAL AS NEEDED
Status: CANCELLED | OUTPATIENT
Start: 2022-09-28

## 2022-06-08 RX ORDER — SODIUM HYPOCHLORITE 2.5 MG/ML
1 SOLUTION TOPICAL AS NEEDED
Status: CANCELLED | OUTPATIENT
Start: 2022-06-08

## 2022-06-08 RX ORDER — LIDOCAINE HYDROCHLORIDE 20 MG/ML
JELLY TOPICAL AS NEEDED
Status: CANCELLED | OUTPATIENT
Start: 2022-06-08

## 2022-06-08 RX ORDER — CASTOR OIL AND BALSAM, PERU 788; 87 MG/G; MG/G
1 OINTMENT TOPICAL AS NEEDED
Status: CANCELLED | OUTPATIENT
Start: 2022-06-08

## 2022-06-08 RX ORDER — CASTOR OIL AND BALSAM, PERU 788; 87 MG/G; MG/G
1 OINTMENT TOPICAL AS NEEDED
Status: CANCELLED | OUTPATIENT
Start: 2022-09-28

## 2022-06-08 RX ORDER — SODIUM HYPOCHLORITE 2.5 MG/ML
1 SOLUTION TOPICAL AS NEEDED
Status: CANCELLED | OUTPATIENT
Start: 2022-09-28

## 2022-06-23 ENCOUNTER — HOSPITAL ENCOUNTER (EMERGENCY)
Facility: HOSPITAL | Age: 47
Discharge: HOME | End: 2022-06-23
Attending: STUDENT IN AN ORGANIZED HEALTH CARE EDUCATION/TRAINING PROGRAM | Admitting: STUDENT IN AN ORGANIZED HEALTH CARE EDUCATION/TRAINING PROGRAM

## 2022-06-23 ENCOUNTER — APPOINTMENT (OUTPATIENT)
Dept: GENERAL RADIOLOGY | Facility: HOSPITAL | Age: 47
End: 2022-06-23

## 2022-06-23 VITALS
DIASTOLIC BLOOD PRESSURE: 76 MMHG | HEART RATE: 87 BPM | OXYGEN SATURATION: 95 % | BODY MASS INDEX: 39.9 KG/M2 | TEMPERATURE: 97.8 F | SYSTOLIC BLOOD PRESSURE: 124 MMHG | WEIGHT: 285 LBS | RESPIRATION RATE: 18 BRPM | HEIGHT: 71 IN

## 2022-06-23 DIAGNOSIS — R06.02 SHORTNESS OF BREATH: Primary | ICD-10-CM

## 2022-06-23 DIAGNOSIS — R60.9 EDEMA, UNSPECIFIED TYPE: ICD-10-CM

## 2022-06-23 DIAGNOSIS — E83.42 HYPOMAGNESEMIA: ICD-10-CM

## 2022-06-23 LAB
ALBUMIN SERPL-MCNC: 3.31 G/DL (ref 3.5–5.2)
ALBUMIN/GLOB SERPL: 0.8 G/DL
ALP SERPL-CCNC: 96 U/L (ref 39–117)
ALT SERPL W P-5'-P-CCNC: 23 U/L (ref 1–41)
ANION GAP SERPL CALCULATED.3IONS-SCNC: 13.8 MMOL/L (ref 5–15)
ANISOCYTOSIS BLD QL: NORMAL
AST SERPL-CCNC: 17 U/L (ref 1–40)
BASOPHILS # BLD AUTO: 0.02 10*3/MM3 (ref 0–0.2)
BASOPHILS NFR BLD AUTO: 0.3 % (ref 0–1.5)
BILIRUB SERPL-MCNC: 0.2 MG/DL (ref 0–1.2)
BUN SERPL-MCNC: 13 MG/DL (ref 6–20)
BUN/CREAT SERPL: 18.8 (ref 7–25)
CALCIUM SPEC-SCNC: 8.7 MG/DL (ref 8.6–10.5)
CHLORIDE SERPL-SCNC: 105 MMOL/L (ref 98–107)
CO2 SERPL-SCNC: 22.2 MMOL/L (ref 22–29)
CREAT SERPL-MCNC: 0.69 MG/DL (ref 0.76–1.27)
CRP SERPL-MCNC: 4.03 MG/DL (ref 0–0.5)
DEPRECATED RDW RBC AUTO: 50.6 FL (ref 37–54)
EGFRCR SERPLBLD CKD-EPI 2021: 117 ML/MIN/1.73
EOSINOPHIL # BLD AUTO: 0.17 10*3/MM3 (ref 0–0.4)
EOSINOPHIL NFR BLD AUTO: 2.4 % (ref 0.3–6.2)
ERYTHROCYTE [DISTWIDTH] IN BLOOD BY AUTOMATED COUNT: 17.2 % (ref 12.3–15.4)
FLUAV RNA RESP QL NAA+PROBE: NOT DETECTED
FLUBV RNA RESP QL NAA+PROBE: NOT DETECTED
GLOBULIN UR ELPH-MCNC: 4.1 GM/DL
GLUCOSE SERPL-MCNC: 159 MG/DL (ref 65–99)
HCT VFR BLD AUTO: 40.3 % (ref 37.5–51)
HGB BLD-MCNC: 11.4 G/DL (ref 13–17.7)
HYPOCHROMIA BLD QL: NORMAL
IMM GRANULOCYTES # BLD AUTO: 0.01 10*3/MM3 (ref 0–0.05)
IMM GRANULOCYTES NFR BLD AUTO: 0.1 % (ref 0–0.5)
LYMPHOCYTES # BLD AUTO: 1.91 10*3/MM3 (ref 0.7–3.1)
LYMPHOCYTES NFR BLD AUTO: 27 % (ref 19.6–45.3)
MAGNESIUM SERPL-MCNC: 1.5 MG/DL (ref 1.6–2.6)
MCH RBC QN AUTO: 23.4 PG (ref 26.6–33)
MCHC RBC AUTO-ENTMCNC: 28.3 G/DL (ref 31.5–35.7)
MCV RBC AUTO: 82.6 FL (ref 79–97)
MONOCYTES # BLD AUTO: 0.24 10*3/MM3 (ref 0.1–0.9)
MONOCYTES NFR BLD AUTO: 3.4 % (ref 5–12)
NEUTROPHILS NFR BLD AUTO: 4.73 10*3/MM3 (ref 1.7–7)
NEUTROPHILS NFR BLD AUTO: 66.8 % (ref 42.7–76)
NRBC BLD AUTO-RTO: 0 /100 WBC (ref 0–0.2)
NT-PROBNP SERPL-MCNC: 1352 PG/ML (ref 0–450)
OVALOCYTES BLD QL SMEAR: NORMAL
PLATELET # BLD AUTO: 340 10*3/MM3 (ref 140–450)
PMV BLD AUTO: 9.7 FL (ref 6–12)
POTASSIUM SERPL-SCNC: 3.8 MMOL/L (ref 3.5–5.2)
PROT SERPL-MCNC: 7.4 G/DL (ref 6–8.5)
RBC # BLD AUTO: 4.88 10*6/MM3 (ref 4.14–5.8)
SARS-COV-2 RNA RESP QL NAA+PROBE: NOT DETECTED
SMALL PLATELETS BLD QL SMEAR: ADEQUATE
SODIUM SERPL-SCNC: 141 MMOL/L (ref 136–145)
WBC NRBC COR # BLD: 7.08 10*3/MM3 (ref 3.4–10.8)

## 2022-06-23 PROCEDURE — 83880 ASSAY OF NATRIURETIC PEPTIDE: CPT | Performed by: STUDENT IN AN ORGANIZED HEALTH CARE EDUCATION/TRAINING PROGRAM

## 2022-06-23 PROCEDURE — 99284 EMERGENCY DEPT VISIT MOD MDM: CPT

## 2022-06-23 PROCEDURE — 86140 C-REACTIVE PROTEIN: CPT | Performed by: STUDENT IN AN ORGANIZED HEALTH CARE EDUCATION/TRAINING PROGRAM

## 2022-06-23 PROCEDURE — 93010 ELECTROCARDIOGRAM REPORT: CPT | Performed by: INTERNAL MEDICINE

## 2022-06-23 PROCEDURE — 71045 X-RAY EXAM CHEST 1 VIEW: CPT

## 2022-06-23 PROCEDURE — 87636 SARSCOV2 & INF A&B AMP PRB: CPT | Performed by: STUDENT IN AN ORGANIZED HEALTH CARE EDUCATION/TRAINING PROGRAM

## 2022-06-23 PROCEDURE — 85025 COMPLETE CBC W/AUTO DIFF WBC: CPT | Performed by: STUDENT IN AN ORGANIZED HEALTH CARE EDUCATION/TRAINING PROGRAM

## 2022-06-23 PROCEDURE — 83735 ASSAY OF MAGNESIUM: CPT | Performed by: STUDENT IN AN ORGANIZED HEALTH CARE EDUCATION/TRAINING PROGRAM

## 2022-06-23 PROCEDURE — 93005 ELECTROCARDIOGRAM TRACING: CPT | Performed by: STUDENT IN AN ORGANIZED HEALTH CARE EDUCATION/TRAINING PROGRAM

## 2022-06-23 PROCEDURE — 25010000002 FUROSEMIDE PER 20 MG: Performed by: STUDENT IN AN ORGANIZED HEALTH CARE EDUCATION/TRAINING PROGRAM

## 2022-06-23 PROCEDURE — 99283 EMERGENCY DEPT VISIT LOW MDM: CPT

## 2022-06-23 PROCEDURE — 96374 THER/PROPH/DIAG INJ IV PUSH: CPT

## 2022-06-23 PROCEDURE — 80053 COMPREHEN METABOLIC PANEL: CPT | Performed by: STUDENT IN AN ORGANIZED HEALTH CARE EDUCATION/TRAINING PROGRAM

## 2022-06-23 PROCEDURE — 85007 BL SMEAR W/DIFF WBC COUNT: CPT | Performed by: STUDENT IN AN ORGANIZED HEALTH CARE EDUCATION/TRAINING PROGRAM

## 2022-06-23 RX ORDER — MAGNESIUM OXIDE 400 MG/1
400 TABLET ORAL DAILY
Qty: 7 TABLET | Refills: 0 | Status: SHIPPED | OUTPATIENT
Start: 2022-06-23 | End: 2022-06-30

## 2022-06-23 RX ORDER — FUROSEMIDE 10 MG/ML
20 INJECTION INTRAMUSCULAR; INTRAVENOUS ONCE
Status: COMPLETED | OUTPATIENT
Start: 2022-06-23 | End: 2022-06-23

## 2022-06-23 RX ADMIN — FUROSEMIDE 20 MG: 10 INJECTION, SOLUTION INTRAMUSCULAR; INTRAVENOUS at 17:17

## 2022-06-23 RX ADMIN — MAGNESIUM GLUCONATE 500 MG ORAL TABLET 400 MG: 500 TABLET ORAL at 19:04
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
## 2022-06-23 NOTE — DISCHARGE INSTRUCTIONS
Recommend increasing your furosemide to 2 pills a day for the next 3 to 4 days.  Please note that excessive use of furosemide may harmful for your kidneys.  Follow-up with your primary care provider.    Additionally your magnesium is just below normal.  You are prescribed magnesium to take for the next 7 days.  Again follow-up with your primary care provider.    Do not hesitate to return here for new onset or worsening symptoms.

## 2022-06-24 LAB
QT INTERVAL: 410 MS
QTC INTERVAL: 498 MS

## 2022-07-06 ENCOUNTER — HOSPITAL ENCOUNTER (OUTPATIENT)
Dept: WOUND CARE | Facility: HOSPITAL | Age: 47
Discharge: HOME | End: 2022-07-06
Admitting: NURSE PRACTITIONER

## 2022-07-06 VITALS
RESPIRATION RATE: 18 BRPM | HEART RATE: 79 BPM | SYSTOLIC BLOOD PRESSURE: 116 MMHG | DIASTOLIC BLOOD PRESSURE: 80 MMHG | TEMPERATURE: 97.7 F

## 2022-07-06 DIAGNOSIS — L89.310 PRESSURE INJURY OF RIGHT BUTTOCK, UNSTAGEABLE: ICD-10-CM

## 2022-07-06 DIAGNOSIS — L89.002 PRESSURE INJURY OF ELBOW, STAGE 2, UNSPECIFIED LATERALITY: Primary | ICD-10-CM

## 2022-07-06 PROCEDURE — 63710000001 ALUMINUM-MAGNESIUM HYDROXIDE-SIMETHICONE 200-200-20 MG/5ML SUSPENSION: Performed by: NURSE PRACTITIONER

## 2022-07-06 PROCEDURE — 63710000001 CLOTRIMAZOLE 1 % CREAM: Performed by: NURSE PRACTITIONER

## 2022-07-06 PROCEDURE — A9270 NON-COVERED ITEM OR SERVICE: HCPCS | Performed by: NURSE PRACTITIONER

## 2022-07-06 PROCEDURE — 97602 WOUND(S) CARE NON-SELECTIVE: CPT

## 2022-07-06 PROCEDURE — 63710000001 VITAMIN A & D OINTMENT: Performed by: NURSE PRACTITIONER

## 2022-07-06 PROCEDURE — 63710000001 ZINC OXIDE 20 % OINTMENT: Performed by: NURSE PRACTITIONER

## 2022-07-06 RX ORDER — LIDOCAINE HYDROCHLORIDE 20 MG/ML
JELLY TOPICAL AS NEEDED
Status: CANCELLED | OUTPATIENT
Start: 2022-09-28

## 2022-07-06 RX ORDER — SODIUM HYPOCHLORITE 2.5 MG/ML
1 SOLUTION TOPICAL AS NEEDED
Status: CANCELLED | OUTPATIENT
Start: 2022-09-28

## 2022-07-06 RX ORDER — CASTOR OIL AND BALSAM, PERU 788; 87 MG/G; MG/G
1 OINTMENT TOPICAL AS NEEDED
Status: CANCELLED | OUTPATIENT
Start: 2022-09-28

## 2022-07-06 RX ORDER — SODIUM HYPOCHLORITE 1.25 MG/ML
1 SOLUTION TOPICAL AS NEEDED
Status: CANCELLED | OUTPATIENT
Start: 2022-09-28
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
## 2022-07-18 PROCEDURE — 36415 COLL VENOUS BLD VENIPUNCTURE: CPT

## 2022-07-18 PROCEDURE — 99283 EMERGENCY DEPT VISIT LOW MDM: CPT

## 2022-07-18 PROCEDURE — 93005 ELECTROCARDIOGRAM TRACING: CPT | Performed by: PHYSICIAN ASSISTANT

## 2022-07-19 ENCOUNTER — HOSPITAL ENCOUNTER (EMERGENCY)
Facility: HOSPITAL | Age: 47
Discharge: HOME | End: 2022-07-19
Attending: STUDENT IN AN ORGANIZED HEALTH CARE EDUCATION/TRAINING PROGRAM | Admitting: STUDENT IN AN ORGANIZED HEALTH CARE EDUCATION/TRAINING PROGRAM

## 2022-07-19 ENCOUNTER — APPOINTMENT (OUTPATIENT)
Dept: GENERAL RADIOLOGY | Facility: HOSPITAL | Age: 47
End: 2022-07-19

## 2022-07-19 VITALS
BODY MASS INDEX: 39.9 KG/M2 | TEMPERATURE: 97.4 F | WEIGHT: 285 LBS | SYSTOLIC BLOOD PRESSURE: 115 MMHG | RESPIRATION RATE: 20 BRPM | DIASTOLIC BLOOD PRESSURE: 74 MMHG | HEIGHT: 71 IN | OXYGEN SATURATION: 96 % | HEART RATE: 90 BPM

## 2022-07-19 DIAGNOSIS — I50.9 ACUTE ON CHRONIC CONGESTIVE HEART FAILURE, UNSPECIFIED HEART FAILURE TYPE: Primary | ICD-10-CM

## 2022-07-19 LAB
ALBUMIN SERPL-MCNC: 3.54 G/DL (ref 3.5–5.2)
ALBUMIN/GLOB SERPL: 1 G/DL
ALP SERPL-CCNC: 89 U/L (ref 39–117)
ALT SERPL W P-5'-P-CCNC: 16 U/L (ref 1–41)
ANION GAP SERPL CALCULATED.3IONS-SCNC: 10.1 MMOL/L (ref 5–15)
ANISOCYTOSIS BLD QL: NORMAL
AST SERPL-CCNC: 12 U/L (ref 1–40)
BASOPHILS # BLD AUTO: 0.03 10*3/MM3 (ref 0–0.2)
BASOPHILS NFR BLD AUTO: 0.3 % (ref 0–1.5)
BILIRUB SERPL-MCNC: 0.2 MG/DL (ref 0–1.2)
BUN SERPL-MCNC: 13 MG/DL (ref 6–20)
BUN/CREAT SERPL: 18.6 (ref 7–25)
CALCIUM SPEC-SCNC: 8.4 MG/DL (ref 8.6–10.5)
CHLORIDE SERPL-SCNC: 102 MMOL/L (ref 98–107)
CK SERPL-CCNC: 64 U/L (ref 20–200)
CO2 SERPL-SCNC: 25.9 MMOL/L (ref 22–29)
CREAT SERPL-MCNC: 0.7 MG/DL (ref 0.76–1.27)
CRP SERPL-MCNC: 3.87 MG/DL (ref 0–0.5)
DEPRECATED RDW RBC AUTO: 48.9 FL (ref 37–54)
EGFRCR SERPLBLD CKD-EPI 2021: 116.5 ML/MIN/1.73
EOSINOPHIL # BLD AUTO: 0.18 10*3/MM3 (ref 0–0.4)
EOSINOPHIL NFR BLD AUTO: 2 % (ref 0.3–6.2)
ERYTHROCYTE [DISTWIDTH] IN BLOOD BY AUTOMATED COUNT: 16.3 % (ref 12.3–15.4)
GLOBULIN UR ELPH-MCNC: 3.7 GM/DL
GLUCOSE SERPL-MCNC: 134 MG/DL (ref 65–99)
HCT VFR BLD AUTO: 40.3 % (ref 37.5–51)
HGB BLD-MCNC: 11.5 G/DL (ref 13–17.7)
HOLD SPECIMEN: NORMAL
HOLD SPECIMEN: NORMAL
HYPOCHROMIA BLD QL: NORMAL
IMM GRANULOCYTES # BLD AUTO: 0.03 10*3/MM3 (ref 0–0.05)
IMM GRANULOCYTES NFR BLD AUTO: 0.3 % (ref 0–0.5)
LYMPHOCYTES # BLD AUTO: 1.86 10*3/MM3 (ref 0.7–3.1)
LYMPHOCYTES NFR BLD AUTO: 21.1 % (ref 19.6–45.3)
MAGNESIUM SERPL-MCNC: 1.5 MG/DL (ref 1.6–2.6)
MCH RBC QN AUTO: 23.7 PG (ref 26.6–33)
MCHC RBC AUTO-ENTMCNC: 28.5 G/DL (ref 31.5–35.7)
MCV RBC AUTO: 83.1 FL (ref 79–97)
MONOCYTES # BLD AUTO: 0.29 10*3/MM3 (ref 0.1–0.9)
MONOCYTES NFR BLD AUTO: 3.3 % (ref 5–12)
NEUTROPHILS NFR BLD AUTO: 6.44 10*3/MM3 (ref 1.7–7)
NEUTROPHILS NFR BLD AUTO: 73 % (ref 42.7–76)
NRBC BLD AUTO-RTO: 0 /100 WBC (ref 0–0.2)
NT-PROBNP SERPL-MCNC: 1996 PG/ML (ref 0–450)
PLAT MORPH BLD: NORMAL
PLATELET # BLD AUTO: 301 10*3/MM3 (ref 140–450)
PMV BLD AUTO: 9.5 FL (ref 6–12)
POTASSIUM SERPL-SCNC: 3.6 MMOL/L (ref 3.5–5.2)
PROT SERPL-MCNC: 7.2 G/DL (ref 6–8.5)
QT INTERVAL: 412 MS
QTC INTERVAL: 501 MS
RBC # BLD AUTO: 4.85 10*6/MM3 (ref 4.14–5.8)
SODIUM SERPL-SCNC: 138 MMOL/L (ref 136–145)
TROPONIN T SERPL-MCNC: <0.01 NG/ML (ref 0–0.03)
TSH SERPL DL<=0.05 MIU/L-ACNC: 3.78 UIU/ML (ref 0.27–4.2)
WBC NRBC COR # BLD: 8.83 10*3/MM3 (ref 3.4–10.8)
WHOLE BLOOD HOLD COAG: NORMAL
WHOLE BLOOD HOLD SPECIMEN: NORMAL

## 2022-07-19 PROCEDURE — 82550 ASSAY OF CK (CPK): CPT | Performed by: PHYSICIAN ASSISTANT

## 2022-07-19 PROCEDURE — 84443 ASSAY THYROID STIM HORMONE: CPT | Performed by: PHYSICIAN ASSISTANT

## 2022-07-19 PROCEDURE — 84484 ASSAY OF TROPONIN QUANT: CPT | Performed by: PHYSICIAN ASSISTANT

## 2022-07-19 PROCEDURE — 86140 C-REACTIVE PROTEIN: CPT | Performed by: PHYSICIAN ASSISTANT

## 2022-07-19 PROCEDURE — 83735 ASSAY OF MAGNESIUM: CPT | Performed by: PHYSICIAN ASSISTANT

## 2022-07-19 PROCEDURE — 96374 THER/PROPH/DIAG INJ IV PUSH: CPT

## 2022-07-19 PROCEDURE — 80053 COMPREHEN METABOLIC PANEL: CPT | Performed by: PHYSICIAN ASSISTANT

## 2022-07-19 PROCEDURE — 36415 COLL VENOUS BLD VENIPUNCTURE: CPT

## 2022-07-19 PROCEDURE — 85025 COMPLETE CBC W/AUTO DIFF WBC: CPT | Performed by: PHYSICIAN ASSISTANT

## 2022-07-19 PROCEDURE — 85007 BL SMEAR W/DIFF WBC COUNT: CPT | Performed by: PHYSICIAN ASSISTANT

## 2022-07-19 PROCEDURE — 83880 ASSAY OF NATRIURETIC PEPTIDE: CPT | Performed by: PHYSICIAN ASSISTANT

## 2022-07-19 PROCEDURE — 93010 ELECTROCARDIOGRAM REPORT: CPT | Performed by: INTERNAL MEDICINE

## 2022-07-19 PROCEDURE — 25010000002 FUROSEMIDE PER 20 MG: Performed by: PHYSICIAN ASSISTANT

## 2022-07-19 PROCEDURE — 71045 X-RAY EXAM CHEST 1 VIEW: CPT

## 2022-07-19 RX ORDER — FUROSEMIDE 10 MG/ML
80 INJECTION INTRAMUSCULAR; INTRAVENOUS ONCE
Status: COMPLETED | OUTPATIENT
Start: 2022-07-19 | End: 2022-07-19

## 2022-07-19 RX ADMIN — FUROSEMIDE 80 MG: 10 INJECTION, SOLUTION INTRAVENOUS at 02:14

## 2022-07-19 NOTE — ED NOTES
MEDICAL SCREENING:    Reason for Visit: chf exacerbation    Patient initially seen in triage.  The patient was advised further evaluation and diagnostic testing will be needed, some of the treatment and testing will be initiated in the lobby in order to begin the process.  The patient will be returned to the waiting area for the time being and possibly be re-assessed by a subsequent ED provider.  The patient will be brought back to the treatment area in as timely manner as possible.         Jose Maria Ramirez II, PA  07/18/22 5039
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
## 2022-08-03 ENCOUNTER — HOSPITAL ENCOUNTER (OUTPATIENT)
Dept: WOUND CARE | Facility: HOSPITAL | Age: 47
Discharge: HOME | End: 2022-08-03
Admitting: NURSE PRACTITIONER

## 2022-08-03 VITALS
TEMPERATURE: 97.5 F | SYSTOLIC BLOOD PRESSURE: 132 MMHG | RESPIRATION RATE: 16 BRPM | HEART RATE: 87 BPM | DIASTOLIC BLOOD PRESSURE: 76 MMHG
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
## 2022-08-18 PROCEDURE — 99284 EMERGENCY DEPT VISIT MOD MDM: CPT

## 2022-08-18 PROCEDURE — 93005 ELECTROCARDIOGRAM TRACING: CPT | Performed by: EMERGENCY MEDICINE

## 2022-08-18 PROCEDURE — 93010 ELECTROCARDIOGRAM REPORT: CPT | Performed by: INTERNAL MEDICINE

## 2022-08-18 RX ORDER — SODIUM CHLORIDE 0.9 % (FLUSH) 0.9 %
10 SYRINGE (ML) INJECTION AS NEEDED
Status: DISCONTINUED | OUTPATIENT
Start: 2022-08-18 | End: 2022-08-19 | Stop reason: HOSPADM

## 2022-08-19 ENCOUNTER — HOSPITAL ENCOUNTER (EMERGENCY)
Facility: HOSPITAL | Age: 47
Discharge: HOME | End: 2022-08-19
Attending: EMERGENCY MEDICINE | Admitting: EMERGENCY MEDICINE

## 2022-08-19 ENCOUNTER — APPOINTMENT (OUTPATIENT)
Dept: GENERAL RADIOLOGY | Facility: HOSPITAL | Age: 47
End: 2022-08-19

## 2022-08-19 VITALS
SYSTOLIC BLOOD PRESSURE: 112 MMHG | TEMPERATURE: 98.1 F | DIASTOLIC BLOOD PRESSURE: 82 MMHG | HEART RATE: 85 BPM | RESPIRATION RATE: 21 BRPM | WEIGHT: 285 LBS | BODY MASS INDEX: 39.9 KG/M2 | OXYGEN SATURATION: 96 % | HEIGHT: 71 IN

## 2022-08-19 DIAGNOSIS — I50.9 ACUTE ON CHRONIC CONGESTIVE HEART FAILURE, UNSPECIFIED HEART FAILURE TYPE: Primary | ICD-10-CM

## 2022-08-19 LAB
ALBUMIN SERPL-MCNC: 3.51 G/DL (ref 3.5–5.2)
ALBUMIN/GLOB SERPL: 0.9 G/DL
ALP SERPL-CCNC: 97 U/L (ref 39–117)
ALT SERPL W P-5'-P-CCNC: 9 U/L (ref 1–41)
ANION GAP SERPL CALCULATED.3IONS-SCNC: 11.2 MMOL/L (ref 5–15)
AST SERPL-CCNC: 10 U/L (ref 1–40)
BASOPHILS # BLD AUTO: 0.02 10*3/MM3 (ref 0–0.2)
BASOPHILS NFR BLD AUTO: 0.2 % (ref 0–1.5)
BILIRUB SERPL-MCNC: 0.4 MG/DL (ref 0–1.2)
BUN SERPL-MCNC: 14 MG/DL (ref 6–20)
BUN/CREAT SERPL: 18.7 (ref 7–25)
CALCIUM SPEC-SCNC: 9 MG/DL (ref 8.6–10.5)
CHLORIDE SERPL-SCNC: 102 MMOL/L (ref 98–107)
CO2 SERPL-SCNC: 26.8 MMOL/L (ref 22–29)
CREAT SERPL-MCNC: 0.75 MG/DL (ref 0.76–1.27)
D-LACTATE SERPL-SCNC: 1.2 MMOL/L (ref 0.5–2)
DEPRECATED RDW RBC AUTO: 50.9 FL (ref 37–54)
EGFRCR SERPLBLD CKD-EPI 2021: 114.1 ML/MIN/1.73
EOSINOPHIL # BLD AUTO: 0.11 10*3/MM3 (ref 0–0.4)
EOSINOPHIL NFR BLD AUTO: 1.1 % (ref 0.3–6.2)
ERYTHROCYTE [DISTWIDTH] IN BLOOD BY AUTOMATED COUNT: 17.2 % (ref 12.3–15.4)
GLOBULIN UR ELPH-MCNC: 4.1 GM/DL
GLUCOSE SERPL-MCNC: 151 MG/DL (ref 65–99)
HCT VFR BLD AUTO: 38.1 % (ref 37.5–51)
HGB BLD-MCNC: 11.2 G/DL (ref 13–17.7)
HOLD SPECIMEN: NORMAL
HOLD SPECIMEN: NORMAL
IMM GRANULOCYTES # BLD AUTO: 0.03 10*3/MM3 (ref 0–0.05)
IMM GRANULOCYTES NFR BLD AUTO: 0.3 % (ref 0–0.5)
LYMPHOCYTES # BLD AUTO: 1.88 10*3/MM3 (ref 0.7–3.1)
LYMPHOCYTES NFR BLD AUTO: 18.3 % (ref 19.6–45.3)
MCH RBC QN AUTO: 24.1 PG (ref 26.6–33)
MCHC RBC AUTO-ENTMCNC: 29.4 G/DL (ref 31.5–35.7)
MCV RBC AUTO: 82.1 FL (ref 79–97)
MONOCYTES # BLD AUTO: 0.45 10*3/MM3 (ref 0.1–0.9)
MONOCYTES NFR BLD AUTO: 4.4 % (ref 5–12)
NEUTROPHILS NFR BLD AUTO: 7.76 10*3/MM3 (ref 1.7–7)
NEUTROPHILS NFR BLD AUTO: 75.7 % (ref 42.7–76)
NRBC BLD AUTO-RTO: 0 /100 WBC (ref 0–0.2)
NT-PROBNP SERPL-MCNC: 3060 PG/ML (ref 0–450)
PLATELET # BLD AUTO: 292 10*3/MM3 (ref 140–450)
PMV BLD AUTO: 10 FL (ref 6–12)
POTASSIUM SERPL-SCNC: 3.6 MMOL/L (ref 3.5–5.2)
PROT SERPL-MCNC: 7.6 G/DL (ref 6–8.5)
QT INTERVAL: 392 MS
QTC INTERVAL: 503 MS
RBC # BLD AUTO: 4.64 10*6/MM3 (ref 4.14–5.8)
SODIUM SERPL-SCNC: 140 MMOL/L (ref 136–145)
TROPONIN T SERPL-MCNC: <0.01 NG/ML (ref 0–0.03)
TROPONIN T SERPL-MCNC: <0.01 NG/ML (ref 0–0.03)
WBC NRBC COR # BLD: 10.25 10*3/MM3 (ref 3.4–10.8)
WHOLE BLOOD HOLD COAG: NORMAL
WHOLE BLOOD HOLD SPECIMEN: NORMAL

## 2022-08-19 PROCEDURE — 71045 X-RAY EXAM CHEST 1 VIEW: CPT

## 2022-08-19 PROCEDURE — 84484 ASSAY OF TROPONIN QUANT: CPT | Performed by: NURSE PRACTITIONER

## 2022-08-19 PROCEDURE — 96374 THER/PROPH/DIAG INJ IV PUSH: CPT

## 2022-08-19 PROCEDURE — 83605 ASSAY OF LACTIC ACID: CPT | Performed by: EMERGENCY MEDICINE

## 2022-08-19 PROCEDURE — 85025 COMPLETE CBC W/AUTO DIFF WBC: CPT | Performed by: EMERGENCY MEDICINE

## 2022-08-19 PROCEDURE — 83880 ASSAY OF NATRIURETIC PEPTIDE: CPT | Performed by: NURSE PRACTITIONER

## 2022-08-19 PROCEDURE — 36415 COLL VENOUS BLD VENIPUNCTURE: CPT

## 2022-08-19 PROCEDURE — 87040 BLOOD CULTURE FOR BACTERIA: CPT | Performed by: EMERGENCY MEDICINE

## 2022-08-19 PROCEDURE — 80053 COMPREHEN METABOLIC PANEL: CPT | Performed by: EMERGENCY MEDICINE

## 2022-08-19 PROCEDURE — 25010000002 FUROSEMIDE PER 20 MG: Performed by: NURSE PRACTITIONER

## 2022-08-19 RX ORDER — FUROSEMIDE 10 MG/ML
60 INJECTION INTRAMUSCULAR; INTRAVENOUS ONCE
Status: COMPLETED | OUTPATIENT
Start: 2022-08-19 | End: 2022-08-19

## 2022-08-19 RX ADMIN — FUROSEMIDE 60 MG: 10 INJECTION, SOLUTION INTRAMUSCULAR; INTRAVENOUS at 04:10
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
## 2022-08-24 LAB
BACTERIA SPEC AEROBE CULT: NORMAL
BACTERIA SPEC AEROBE CULT: NORMAL

## 2022-08-25 ENCOUNTER — HOSPITAL ENCOUNTER (OUTPATIENT)
Dept: CARDIOLOGY | Facility: HOSPITAL | Age: 47
Discharge: HOME | End: 2022-08-25
Admitting: PHYSICIAN ASSISTANT

## 2022-08-25 VITALS — SYSTOLIC BLOOD PRESSURE: 106 MMHG | HEART RATE: 89 BPM | DIASTOLIC BLOOD PRESSURE: 67 MMHG | OXYGEN SATURATION: 94 %

## 2022-08-25 DIAGNOSIS — E83.42 HYPOMAGNESEMIA: ICD-10-CM

## 2022-08-25 DIAGNOSIS — I50.43 ACUTE ON CHRONIC COMBINED SYSTOLIC AND DIASTOLIC CHF (CONGESTIVE HEART FAILURE): Primary | ICD-10-CM

## 2022-08-25 DIAGNOSIS — Z82.49 FAMILY HISTORY OF HEART DISEASE IN BROTHER: ICD-10-CM

## 2022-08-25 DIAGNOSIS — Z86.711 HISTORY OF PULMONARY EMBOLISM: ICD-10-CM

## 2022-08-25 LAB
ABSOLUTE LUNG FLUID CONTENT: 55 % (ref 20–35)
ANION GAP SERPL CALCULATED.3IONS-SCNC: 9.5 MMOL/L (ref 5–15)
BUN SERPL-MCNC: 14 MG/DL (ref 6–20)
BUN/CREAT SERPL: 15.2 (ref 7–25)
CALCIUM SPEC-SCNC: 8.7 MG/DL (ref 8.6–10.5)
CHLORIDE SERPL-SCNC: 105 MMOL/L (ref 98–107)
CO2 SERPL-SCNC: 26.5 MMOL/L (ref 22–29)
CREAT SERPL-MCNC: 0.92 MG/DL (ref 0.76–1.27)
EGFRCR SERPLBLD CKD-EPI 2021: 105.2 ML/MIN/1.73
GLUCOSE SERPL-MCNC: 172 MG/DL (ref 65–99)
MAGNESIUM SERPL-MCNC: 1.4 MG/DL (ref 1.6–2.6)
NT-PROBNP SERPL-MCNC: 1724 PG/ML (ref 0–450)
POTASSIUM SERPL-SCNC: 4.1 MMOL/L (ref 3.5–5.2)
SODIUM SERPL-SCNC: 141 MMOL/L (ref 136–145)

## 2022-08-25 PROCEDURE — 83880 ASSAY OF NATRIURETIC PEPTIDE: CPT

## 2022-08-25 PROCEDURE — 83735 ASSAY OF MAGNESIUM: CPT | Performed by: PHYSICIAN ASSISTANT

## 2022-08-25 PROCEDURE — 36415 COLL VENOUS BLD VENIPUNCTURE: CPT | Performed by: PHYSICIAN ASSISTANT

## 2022-08-25 PROCEDURE — 99215 OFFICE O/P EST HI 40 MIN: CPT | Performed by: PHYSICIAN ASSISTANT

## 2022-08-25 PROCEDURE — 80048 BASIC METABOLIC PNL TOTAL CA: CPT | Performed by: PHYSICIAN ASSISTANT

## 2022-08-25 PROCEDURE — 94726 PLETHYSMOGRAPHY LUNG VOLUMES: CPT | Performed by: PHYSICIAN ASSISTANT

## 2022-08-25 RX ORDER — MAGNESIUM OXIDE 400 MG/1
1200 TABLET ORAL DAILY
COMMUNITY

## 2022-08-25 RX ORDER — OMEPRAZOLE 40 MG/1
40 CAPSULE, DELAYED RELEASE ORAL 2 TIMES DAILY
COMMUNITY

## 2022-08-25 RX ORDER — BUMETANIDE 2 MG/1
2 TABLET ORAL 2 TIMES DAILY
COMMUNITY
End: 2022-09-08 | Stop reason: HOSPADM

## 2022-08-25 RX ORDER — SPIRONOLACTONE 25 MG/1
12.5 TABLET ORAL DAILY
Qty: 15 TABLET | Refills: 0 | Status: SHIPPED | OUTPATIENT
Start: 2022-08-25 | End: 2022-09-01
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
## 2022-08-25 NOTE — PROGRESS NOTES
Heart Failure Clinic    Date: 08/25/22     Vitals:    08/25/22 1355   BP: 106/67   Pulse: 89   SpO2: 94%        Method of arrival: Other power chair    Weighing self daily: No    Monitoring Heart Failure Zones: No    Today's HF Zone: Reviewed Zones    Taking medications as prescribed: Yes    Edema Yes    Shortness of Air: Yes    Number of pillows used at night: hospital bed aproximately 30 degree elevation    Educational Materials given:  Avs, Living with heart failure                                                                         ReDS Value: 55  Over 50 Extreme Overload      Andrew Good RN 08/25/22 13:56 EDT

## 2022-08-31 ENCOUNTER — HOSPITAL ENCOUNTER (OUTPATIENT)
Dept: WOUND CARE | Facility: HOSPITAL | Age: 47
Discharge: HOME | End: 2022-08-31
Admitting: NURSE PRACTITIONER

## 2022-08-31 VITALS
HEART RATE: 88 BPM | RESPIRATION RATE: 19 BRPM | SYSTOLIC BLOOD PRESSURE: 153 MMHG | DIASTOLIC BLOOD PRESSURE: 85 MMHG | TEMPERATURE: 98.5 F

## 2022-08-31 DIAGNOSIS — L89.611 PRESSURE INJURY OF RIGHT HEEL, STAGE 1: ICD-10-CM

## 2022-08-31 DIAGNOSIS — L89.314 PRESSURE INJURY OF RIGHT BUTTOCK, STAGE 4: Primary | ICD-10-CM

## 2022-08-31 DIAGNOSIS — L89.324 PRESSURE INJURY OF LEFT BUTTOCK, STAGE 4: ICD-10-CM

## 2022-08-31 PROCEDURE — 63710000001 CLOTRIMAZOLE 1 % CREAM: Performed by: NURSE PRACTITIONER

## 2022-08-31 PROCEDURE — A9270 NON-COVERED ITEM OR SERVICE: HCPCS | Performed by: NURSE PRACTITIONER

## 2022-08-31 PROCEDURE — 97602 WOUND(S) CARE NON-SELECTIVE: CPT

## 2022-08-31 PROCEDURE — 63710000001 ZINC OXIDE 20 % OINTMENT: Performed by: NURSE PRACTITIONER

## 2022-08-31 PROCEDURE — 63710000001 ALUMINUM-MAGNESIUM HYDROXIDE-SIMETHICONE 200-200-20 MG/5ML SUSPENSION: Performed by: NURSE PRACTITIONER

## 2022-08-31 PROCEDURE — 63710000001 VITAMIN A & D OINTMENT: Performed by: NURSE PRACTITIONER
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
## 2022-09-01 ENCOUNTER — HOSPITAL ENCOUNTER (OUTPATIENT)
Dept: CARDIOLOGY | Facility: HOSPITAL | Age: 47
Discharge: HOME | End: 2022-09-01
Admitting: PHYSICIAN ASSISTANT

## 2022-09-01 VITALS — SYSTOLIC BLOOD PRESSURE: 126 MMHG | OXYGEN SATURATION: 93 % | DIASTOLIC BLOOD PRESSURE: 82 MMHG | HEART RATE: 86 BPM

## 2022-09-01 DIAGNOSIS — I10 ESSENTIAL HYPERTENSION: ICD-10-CM

## 2022-09-01 DIAGNOSIS — E83.42 HYPOMAGNESEMIA: ICD-10-CM

## 2022-09-01 DIAGNOSIS — R60.1 ANASARCA: ICD-10-CM

## 2022-09-01 DIAGNOSIS — I50.43 ACUTE ON CHRONIC COMBINED SYSTOLIC AND DIASTOLIC CHF (CONGESTIVE HEART FAILURE): Primary | ICD-10-CM

## 2022-09-01 LAB
ANION GAP SERPL CALCULATED.3IONS-SCNC: 10.9 MMOL/L (ref 5–15)
BUN SERPL-MCNC: 15 MG/DL (ref 6–20)
BUN/CREAT SERPL: 16.9 (ref 7–25)
CALCIUM SPEC-SCNC: 9 MG/DL (ref 8.6–10.5)
CHLORIDE SERPL-SCNC: 104 MMOL/L (ref 98–107)
CO2 SERPL-SCNC: 25.1 MMOL/L (ref 22–29)
CREAT SERPL-MCNC: 0.89 MG/DL (ref 0.76–1.27)
EGFRCR SERPLBLD CKD-EPI 2021: 108.4 ML/MIN/1.73
GLUCOSE SERPL-MCNC: 148 MG/DL (ref 65–99)
MAGNESIUM SERPL-MCNC: 1.4 MG/DL (ref 1.6–2.6)
NT-PROBNP SERPL-MCNC: 2056 PG/ML (ref 0–450)
POTASSIUM SERPL-SCNC: 3.8 MMOL/L (ref 3.5–5.2)
SODIUM SERPL-SCNC: 140 MMOL/L (ref 136–145)

## 2022-09-01 PROCEDURE — 80048 BASIC METABOLIC PNL TOTAL CA: CPT | Performed by: PHYSICIAN ASSISTANT

## 2022-09-01 PROCEDURE — 99215 OFFICE O/P EST HI 40 MIN: CPT | Performed by: PHYSICIAN ASSISTANT

## 2022-09-01 PROCEDURE — 96374 THER/PROPH/DIAG INJ IV PUSH: CPT

## 2022-09-01 PROCEDURE — 36415 COLL VENOUS BLD VENIPUNCTURE: CPT | Performed by: PHYSICIAN ASSISTANT

## 2022-09-01 PROCEDURE — 83735 ASSAY OF MAGNESIUM: CPT | Performed by: PHYSICIAN ASSISTANT

## 2022-09-01 PROCEDURE — 83880 ASSAY OF NATRIURETIC PEPTIDE: CPT

## 2022-09-01 RX ORDER — SPIRONOLACTONE 25 MG/1
25 TABLET ORAL DAILY
Qty: 30 TABLET | Refills: 0 | Status: SHIPPED | OUTPATIENT
Start: 2022-09-01 | End: 2022-10-07 | Stop reason: SDUPTHER

## 2022-09-01 RX ORDER — BUMETANIDE 1 MG/1
1 TABLET ORAL DAILY
Qty: 30 TABLET | Refills: 0 | Status: SHIPPED | OUTPATIENT
Start: 2022-09-01 | End: 2022-09-08 | Stop reason: HOSPADM

## 2022-09-01 RX ORDER — BUMETANIDE 0.25 MG/ML
4 INJECTION INTRAMUSCULAR; INTRAVENOUS ONCE
Status: COMPLETED | OUTPATIENT
Start: 2022-09-01 | End: 2022-09-01

## 2022-09-01 RX ADMIN — BUMETANIDE 4 MG: 0.25 INJECTION, SOLUTION INTRAMUSCULAR; INTRAVENOUS at 15:44
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
## 2022-09-01 NOTE — PROGRESS NOTES
Heart Failure Clinic    Date: 09/01/22     Vitals:    09/01/22 1417   BP: 130/90   Pulse: 84   SpO2: 94%        Method of arrival: Other power chair    Weighing self daily: No    Monitoring Heart Failure Zones: Yes    Today's HF Zone: Yellow     Taking medications as prescribed: Yes    Edema Yes    Shortness of Air: Yes    Number of pillows used at night:hospital bed    Educational Materials given:  avs                                                                    ReDS Value: low quality x3 attempts        Andrew Good RN 09/01/22 14:22 EDT
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
## 2022-09-08 ENCOUNTER — HOSPITAL ENCOUNTER (OUTPATIENT)
Dept: CARDIOLOGY | Facility: HOSPITAL | Age: 47
Discharge: HOME | End: 2022-09-08
Admitting: PHYSICIAN ASSISTANT

## 2022-09-08 VITALS — HEART RATE: 89 BPM | SYSTOLIC BLOOD PRESSURE: 112 MMHG | OXYGEN SATURATION: 98 % | DIASTOLIC BLOOD PRESSURE: 80 MMHG

## 2022-09-08 DIAGNOSIS — I50.42 CHRONIC COMBINED SYSTOLIC AND DIASTOLIC HEART FAILURE: Primary | ICD-10-CM

## 2022-09-08 DIAGNOSIS — G47.33 OSA (OBSTRUCTIVE SLEEP APNEA): ICD-10-CM

## 2022-09-08 LAB
ANION GAP SERPL CALCULATED.3IONS-SCNC: 9 MMOL/L (ref 5–15)
BUN SERPL-MCNC: 19 MG/DL (ref 6–20)
BUN/CREAT SERPL: 19.6 (ref 7–25)
CALCIUM SPEC-SCNC: 9.7 MG/DL (ref 8.6–10.5)
CHLORIDE SERPL-SCNC: 102 MMOL/L (ref 98–107)
CO2 SERPL-SCNC: 28 MMOL/L (ref 22–29)
CREAT SERPL-MCNC: 0.97 MG/DL (ref 0.76–1.27)
EGFRCR SERPLBLD CKD-EPI 2021: 98.7 ML/MIN/1.73
GLUCOSE SERPL-MCNC: 186 MG/DL (ref 65–99)
MAGNESIUM SERPL-MCNC: 1.5 MG/DL (ref 1.6–2.6)
NT-PROBNP SERPL-MCNC: 2283 PG/ML (ref 0–450)
POTASSIUM SERPL-SCNC: 4.4 MMOL/L (ref 3.5–5.2)
SODIUM SERPL-SCNC: 139 MMOL/L (ref 136–145)

## 2022-09-08 PROCEDURE — 83880 ASSAY OF NATRIURETIC PEPTIDE: CPT

## 2022-09-08 PROCEDURE — 36415 COLL VENOUS BLD VENIPUNCTURE: CPT | Performed by: PHYSICIAN ASSISTANT

## 2022-09-08 PROCEDURE — 83735 ASSAY OF MAGNESIUM: CPT | Performed by: PHYSICIAN ASSISTANT

## 2022-09-08 PROCEDURE — 80048 BASIC METABOLIC PNL TOTAL CA: CPT | Performed by: PHYSICIAN ASSISTANT

## 2022-09-08 RX ORDER — BUMETANIDE 2 MG/1
2 TABLET ORAL 2 TIMES DAILY
Qty: 60 TABLET | Refills: 0 | Status: SHIPPED | OUTPATIENT
Start: 2022-09-08 | End: 2022-10-07 | Stop reason: SDUPTHER
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
## 2022-09-08 NOTE — PROGRESS NOTES
Heart Failure Clinic    Date: 09/08/22     Vitals:    09/08/22 1354   BP: 112/80   Pulse: 89   SpO2: 98%        Method of arrival: Other power chair    Weighing self daily: No    Monitoring Heart Failure Zones: Yes    Today's HF Zone: Yellow     Taking medications as prescribed: Yes    Edema Yes    Shortness of Air: Yes    Number of pillows used at night:Recliner    Educational Materials given:  avs                                                                         ReDS Value: low quality x3 attempts        Andrew Good RN 09/08/22 13:54 EDT

## 2022-09-22 ENCOUNTER — HOSPITAL ENCOUNTER (OUTPATIENT)
Dept: CARDIOLOGY | Facility: HOSPITAL | Age: 47
Discharge: HOME | End: 2022-09-22
Admitting: PHYSICIAN ASSISTANT

## 2022-09-22 VITALS — HEART RATE: 81 BPM | SYSTOLIC BLOOD PRESSURE: 106 MMHG | DIASTOLIC BLOOD PRESSURE: 73 MMHG | OXYGEN SATURATION: 95 %

## 2022-09-22 DIAGNOSIS — Z86.711 HISTORY OF PULMONARY EMBOLISM: ICD-10-CM

## 2022-09-22 DIAGNOSIS — E11.9 TYPE 2 DIABETES MELLITUS WITHOUT COMPLICATION, WITHOUT LONG-TERM CURRENT USE OF INSULIN: ICD-10-CM

## 2022-09-22 DIAGNOSIS — I50.43 ACUTE ON CHRONIC COMBINED SYSTOLIC AND DIASTOLIC CHF (CONGESTIVE HEART FAILURE): Primary | ICD-10-CM

## 2022-09-22 DIAGNOSIS — E83.42 HYPOMAGNESEMIA: ICD-10-CM

## 2022-09-22 LAB
ANION GAP SERPL CALCULATED.3IONS-SCNC: 15 MMOL/L (ref 5–15)
BUN SERPL-MCNC: 18 MG/DL (ref 6–20)
BUN/CREAT SERPL: 22.8 (ref 7–25)
CALCIUM SPEC-SCNC: 8.7 MG/DL (ref 8.6–10.5)
CHLORIDE SERPL-SCNC: 102 MMOL/L (ref 98–107)
CO2 SERPL-SCNC: 24 MMOL/L (ref 22–29)
CREAT SERPL-MCNC: 0.79 MG/DL (ref 0.76–1.27)
EGFRCR SERPLBLD CKD-EPI 2021: 112.3 ML/MIN/1.73
GLUCOSE SERPL-MCNC: 126 MG/DL (ref 65–99)
MAGNESIUM SERPL-MCNC: 1.2 MG/DL (ref 1.6–2.6)
NT-PROBNP SERPL-MCNC: 1556 PG/ML (ref 0–450)
POTASSIUM SERPL-SCNC: 4.1 MMOL/L (ref 3.5–5.2)
SODIUM SERPL-SCNC: 141 MMOL/L (ref 136–145)

## 2022-09-22 PROCEDURE — 83735 ASSAY OF MAGNESIUM: CPT | Performed by: PHYSICIAN ASSISTANT

## 2022-09-22 PROCEDURE — 83880 ASSAY OF NATRIURETIC PEPTIDE: CPT

## 2022-09-22 PROCEDURE — 80048 BASIC METABOLIC PNL TOTAL CA: CPT | Performed by: PHYSICIAN ASSISTANT

## 2022-09-22 PROCEDURE — 36415 COLL VENOUS BLD VENIPUNCTURE: CPT | Performed by: PHYSICIAN ASSISTANT

## 2022-09-22 PROCEDURE — 99215 OFFICE O/P EST HI 40 MIN: CPT | Performed by: PHYSICIAN ASSISTANT

## 2022-09-22 RX ORDER — CARVEDILOL 12.5 MG/1
12.5 TABLET ORAL 2 TIMES DAILY WITH MEALS
Qty: 60 TABLET | Refills: 5 | Status: SHIPPED | OUTPATIENT
Start: 2022-09-22 | End: 2022-10-27
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
## 2022-09-28 ENCOUNTER — HOSPITAL ENCOUNTER (OUTPATIENT)
Dept: WOUND CARE | Facility: HOSPITAL | Age: 47
Discharge: HOME | End: 2022-09-28
Admitting: NURSE PRACTITIONER

## 2022-09-28 VITALS
HEART RATE: 80 BPM | SYSTOLIC BLOOD PRESSURE: 105 MMHG | TEMPERATURE: 97.5 F | RESPIRATION RATE: 17 BRPM | DIASTOLIC BLOOD PRESSURE: 62 MMHG

## 2022-09-28 DIAGNOSIS — E08.621 DIABETIC ULCER OF RIGHT HEEL ASSOCIATED WITH DIABETES MELLITUS DUE TO UNDERLYING CONDITION, LIMITED TO BREAKDOWN OF SKIN: ICD-10-CM

## 2022-09-28 DIAGNOSIS — L97.411 DIABETIC ULCER OF RIGHT HEEL ASSOCIATED WITH DIABETES MELLITUS DUE TO UNDERLYING CONDITION, LIMITED TO BREAKDOWN OF SKIN: ICD-10-CM

## 2022-09-28 DIAGNOSIS — L97.321 DIABETIC ULCER OF LEFT ANKLE, LIMITED TO BREAKDOWN OF SKIN: Primary | ICD-10-CM

## 2022-09-28 DIAGNOSIS — E11.622 DIABETIC ULCER OF LEFT ANKLE, LIMITED TO BREAKDOWN OF SKIN: Primary | ICD-10-CM

## 2022-09-28 DIAGNOSIS — L97.912 NON-PRESSURE CHRONIC ULCER OF UNSPECIFIED PART OF RIGHT LOWER LEG WITH FAT LAYER EXPOSED: ICD-10-CM

## 2022-09-28 PROCEDURE — A9270 NON-COVERED ITEM OR SERVICE: HCPCS | Performed by: NURSE PRACTITIONER

## 2022-09-28 PROCEDURE — 97602 WOUND(S) CARE NON-SELECTIVE: CPT

## 2022-09-28 PROCEDURE — 63710000001 ALUMINUM-MAGNESIUM HYDROXIDE-SIMETHICONE 200-200-20 MG/5ML SUSPENSION: Performed by: NURSE PRACTITIONER

## 2022-09-28 PROCEDURE — 63710000001 ZINC OXIDE 20 % OINTMENT 454 G JAR: Performed by: NURSE PRACTITIONER

## 2022-09-28 PROCEDURE — 63710000001 A&D OINTMENT 425 G JAR: Performed by: NURSE PRACTITIONER

## 2022-09-28 PROCEDURE — 63710000001 CLOTRIMAZOLE 1 % CREAM 30 G TUBE: Performed by: NURSE PRACTITIONER

## 2022-09-28 RX ADMIN — VITAMINS A AND D OINTMENT 1 APPLICATION: 15.5; 53.4 OINTMENT TOPICAL at 11:20
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
## 2022-09-29 ENCOUNTER — HOSPITAL ENCOUNTER (OUTPATIENT)
Dept: CARDIOLOGY | Facility: HOSPITAL | Age: 47
Discharge: HOME | End: 2022-09-29

## 2022-09-29 ENCOUNTER — OFFICE VISIT (OUTPATIENT)
Dept: CARDIOLOGY | Facility: CLINIC | Age: 47
End: 2022-09-29

## 2022-09-29 ENCOUNTER — HOSPITAL ENCOUNTER (OUTPATIENT)
Dept: ULTRASOUND IMAGING | Facility: HOSPITAL | Age: 47
Discharge: HOME | End: 2022-09-29

## 2022-09-29 VITALS
SYSTOLIC BLOOD PRESSURE: 100 MMHG | HEIGHT: 71 IN | DIASTOLIC BLOOD PRESSURE: 57 MMHG | RESPIRATION RATE: 16 BRPM | HEART RATE: 72 BPM | BODY MASS INDEX: 39.75 KG/M2

## 2022-09-29 DIAGNOSIS — L97.411 DIABETIC ULCER OF RIGHT HEEL ASSOCIATED WITH DIABETES MELLITUS DUE TO UNDERLYING CONDITION, LIMITED TO BREAKDOWN OF SKIN: ICD-10-CM

## 2022-09-29 DIAGNOSIS — Z86.711 HISTORY OF PULMONARY EMBOLISM: ICD-10-CM

## 2022-09-29 DIAGNOSIS — E11.622 DIABETIC ULCER OF LEFT ANKLE, LIMITED TO BREAKDOWN OF SKIN: ICD-10-CM

## 2022-09-29 DIAGNOSIS — G47.33 OSA (OBSTRUCTIVE SLEEP APNEA): ICD-10-CM

## 2022-09-29 DIAGNOSIS — L97.912 NON-PRESSURE CHRONIC ULCER OF UNSPECIFIED PART OF RIGHT LOWER LEG WITH FAT LAYER EXPOSED: ICD-10-CM

## 2022-09-29 DIAGNOSIS — I50.22 CHRONIC HFREF (HEART FAILURE WITH REDUCED EJECTION FRACTION): ICD-10-CM

## 2022-09-29 DIAGNOSIS — E78.5 DYSLIPIDEMIA: ICD-10-CM

## 2022-09-29 DIAGNOSIS — G47.33 OSA ON CPAP: ICD-10-CM

## 2022-09-29 DIAGNOSIS — E08.621 DIABETIC ULCER OF RIGHT HEEL ASSOCIATED WITH DIABETES MELLITUS DUE TO UNDERLYING CONDITION, LIMITED TO BREAKDOWN OF SKIN: ICD-10-CM

## 2022-09-29 DIAGNOSIS — Z79.01 CHRONIC ANTICOAGULATION: ICD-10-CM

## 2022-09-29 DIAGNOSIS — L97.321 DIABETIC ULCER OF LEFT ANKLE, LIMITED TO BREAKDOWN OF SKIN: ICD-10-CM

## 2022-09-29 DIAGNOSIS — Z99.89 OSA ON CPAP: ICD-10-CM

## 2022-09-29 DIAGNOSIS — I42.0 CARDIOMYOPATHY, DILATED: Primary | ICD-10-CM

## 2022-09-29 PROCEDURE — 93971 EXTREMITY STUDY: CPT | Performed by: RADIOLOGY

## 2022-09-29 PROCEDURE — 93922 UPR/L XTREMITY ART 2 LEVELS: CPT | Performed by: RADIOLOGY

## 2022-09-29 PROCEDURE — 93922 UPR/L XTREMITY ART 2 LEVELS: CPT

## 2022-09-29 PROCEDURE — 99214 OFFICE O/P EST MOD 30 MIN: CPT | Performed by: SPECIALIST

## 2022-09-29 PROCEDURE — 93971 EXTREMITY STUDY: CPT
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
## 2022-10-07 RX ORDER — BUMETANIDE 2 MG/1
2 TABLET ORAL 2 TIMES DAILY
Qty: 60 TABLET | Refills: 0 | Status: SHIPPED | OUTPATIENT
Start: 2022-10-07 | End: 2022-11-03

## 2022-10-07 RX ORDER — SPIRONOLACTONE 25 MG/1
25 TABLET ORAL DAILY
Qty: 90 TABLET | Refills: 0 | Status: SHIPPED | OUTPATIENT
Start: 2022-10-07 | End: 2022-11-17 | Stop reason: SDUPTHER

## 2022-10-20 ENCOUNTER — APPOINTMENT (OUTPATIENT)
Dept: CARDIOLOGY | Facility: HOSPITAL | Age: 47
End: 2022-10-20

## 2022-10-21 ENCOUNTER — HOSPITAL ENCOUNTER (OUTPATIENT)
Dept: CARDIOLOGY | Facility: HOSPITAL | Age: 47
Discharge: HOME | End: 2022-10-21
Admitting: PHYSICIAN ASSISTANT

## 2022-10-21 VITALS
BODY MASS INDEX: 39.75 KG/M2 | SYSTOLIC BLOOD PRESSURE: 124 MMHG | OXYGEN SATURATION: 100 % | HEART RATE: 77 BPM | HEIGHT: 71 IN | DIASTOLIC BLOOD PRESSURE: 76 MMHG

## 2022-10-21 DIAGNOSIS — Z99.89 OSA ON CPAP: ICD-10-CM

## 2022-10-21 DIAGNOSIS — I50.23 ACUTE ON CHRONIC HFREF (HEART FAILURE WITH REDUCED EJECTION FRACTION): Primary | ICD-10-CM

## 2022-10-21 DIAGNOSIS — E11.9 TYPE 2 DIABETES MELLITUS WITHOUT COMPLICATION, WITHOUT LONG-TERM CURRENT USE OF INSULIN: ICD-10-CM

## 2022-10-21 DIAGNOSIS — G47.33 OSA ON CPAP: ICD-10-CM

## 2022-10-21 DIAGNOSIS — Z79.01 CHRONIC ANTICOAGULATION: ICD-10-CM

## 2022-10-21 LAB
ANION GAP SERPL CALCULATED.3IONS-SCNC: 12.5 MMOL/L (ref 5–15)
BUN SERPL-MCNC: 14 MG/DL (ref 6–20)
BUN/CREAT SERPL: 15.1 (ref 7–25)
CALCIUM SPEC-SCNC: 8.9 MG/DL (ref 8.6–10.5)
CHLORIDE SERPL-SCNC: 101 MMOL/L (ref 98–107)
CO2 SERPL-SCNC: 26.5 MMOL/L (ref 22–29)
CREAT SERPL-MCNC: 0.93 MG/DL (ref 0.76–1.27)
EGFRCR SERPLBLD CKD-EPI 2021: 103.2 ML/MIN/1.73
GLUCOSE SERPL-MCNC: 226 MG/DL (ref 65–99)
MAGNESIUM SERPL-MCNC: 1.3 MG/DL (ref 1.6–2.6)
NT-PROBNP SERPL-MCNC: 1827 PG/ML (ref 0–450)
POTASSIUM SERPL-SCNC: 4 MMOL/L (ref 3.5–5.2)
SODIUM SERPL-SCNC: 140 MMOL/L (ref 136–145)

## 2022-10-21 PROCEDURE — 83880 ASSAY OF NATRIURETIC PEPTIDE: CPT

## 2022-10-21 PROCEDURE — 83735 ASSAY OF MAGNESIUM: CPT | Performed by: PHYSICIAN ASSISTANT

## 2022-10-21 PROCEDURE — 96374 THER/PROPH/DIAG INJ IV PUSH: CPT

## 2022-10-21 PROCEDURE — 36415 COLL VENOUS BLD VENIPUNCTURE: CPT | Performed by: PHYSICIAN ASSISTANT

## 2022-10-21 PROCEDURE — 99215 OFFICE O/P EST HI 40 MIN: CPT | Performed by: PHYSICIAN ASSISTANT

## 2022-10-21 PROCEDURE — 80048 BASIC METABOLIC PNL TOTAL CA: CPT | Performed by: PHYSICIAN ASSISTANT

## 2022-10-21 RX ORDER — BUMETANIDE 0.25 MG/ML
4 INJECTION INTRAMUSCULAR; INTRAVENOUS ONCE
Status: COMPLETED | OUTPATIENT
Start: 2022-10-21 | End: 2022-10-21

## 2022-10-21 RX ORDER — METOLAZONE 2.5 MG/1
2.5 TABLET ORAL 3 TIMES WEEKLY
Qty: 15 TABLET | Refills: 0 | Status: SHIPPED | OUTPATIENT
Start: 2022-10-21 | End: 2022-11-17 | Stop reason: SDUPTHER

## 2022-10-21 RX ADMIN — BUMETANIDE 4 MG: 0.25 INJECTION, SOLUTION INTRAMUSCULAR; INTRAVENOUS at 14:32
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
## 2022-10-21 NOTE — PROGRESS NOTES
Heart Failure Clinic    Date: 10/21/22     Vitals:    10/21/22 1329   BP: 118/74   Pulse: 74   SpO2: 100%   weight not able to be weighed    Method of arrival: wheel chair    Weighing self daily: No    Monitoring Heart Failure Zones: Yes    Today's HF Zone: Yellow     Taking medications as prescribed: Yes    Edema Yes    Shortness of Air: Yes    Number of pillows used at night:Recliner    Educational Materials given:  [facility] Value:         Ruslan Hyatt Rep 10/21/22 13:30 EDT

## 2022-10-26 ENCOUNTER — HOSPITAL ENCOUNTER (OUTPATIENT)
Dept: WOUND CARE | Facility: HOSPITAL | Age: 47
Discharge: HOME | End: 2022-10-26
Admitting: NURSE PRACTITIONER

## 2022-10-26 VITALS
DIASTOLIC BLOOD PRESSURE: 62 MMHG | RESPIRATION RATE: 18 BRPM | TEMPERATURE: 98.8 F | SYSTOLIC BLOOD PRESSURE: 105 MMHG | HEART RATE: 74 BPM

## 2022-10-26 DIAGNOSIS — L89.002 PRESSURE INJURY OF ELBOW, STAGE 2, UNSPECIFIED LATERALITY: Primary | ICD-10-CM

## 2022-10-26 DIAGNOSIS — L89.310 PRESSURE INJURY OF RIGHT BUTTOCK, UNSTAGEABLE: ICD-10-CM

## 2022-10-26 PROCEDURE — 63710000001 ALUMINUM-MAGNESIUM HYDROXIDE-SIMETHICONE 200-200-20 MG/5ML SUSPENSION: Performed by: NURSE PRACTITIONER

## 2022-10-26 PROCEDURE — A9270 NON-COVERED ITEM OR SERVICE: HCPCS | Performed by: NURSE PRACTITIONER

## 2022-10-26 PROCEDURE — 63710000001 CLOTRIMAZOLE 1 % CREAM 30 G TUBE: Performed by: NURSE PRACTITIONER

## 2022-10-26 PROCEDURE — 97602 WOUND(S) CARE NON-SELECTIVE: CPT

## 2022-10-26 PROCEDURE — 63710000001 A&D OINTMENT 425 G JAR: Performed by: NURSE PRACTITIONER

## 2022-10-26 PROCEDURE — 63710000001 ZINC OXIDE 20 % OINTMENT 454 G JAR: Performed by: NURSE PRACTITIONER

## 2022-10-26 RX ORDER — LIDOCAINE HYDROCHLORIDE 20 MG/ML
JELLY TOPICAL AS NEEDED
Status: CANCELLED
Start: 2022-10-26

## 2022-10-26 RX ORDER — LIDOCAINE HYDROCHLORIDE 20 MG/ML
JELLY TOPICAL AS NEEDED
Status: CANCELLED | OUTPATIENT
Start: 2022-10-26

## 2022-10-26 RX ORDER — SODIUM HYPOCHLORITE 1.25 MG/ML
1 SOLUTION TOPICAL AS NEEDED
Status: CANCELLED | OUTPATIENT
Start: 2022-10-26

## 2022-10-26 RX ORDER — CASTOR OIL AND BALSAM, PERU 788; 87 MG/G; MG/G
1 OINTMENT TOPICAL AS NEEDED
Status: CANCELLED | OUTPATIENT
Start: 2022-10-26

## 2022-10-26 RX ORDER — SODIUM HYPOCHLORITE 2.5 MG/ML
1 SOLUTION TOPICAL AS NEEDED
Status: CANCELLED | OUTPATIENT
Start: 2022-10-26

## 2022-10-26 RX ADMIN — VITAMINS A AND D OINTMENT 1 APPLICATION: 15.5; 53.4 OINTMENT TOPICAL at 11:23
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
## 2022-10-27 ENCOUNTER — HOSPITAL ENCOUNTER (OUTPATIENT)
Dept: CARDIOLOGY | Facility: HOSPITAL | Age: 47
Discharge: HOME | End: 2022-10-27
Admitting: PHYSICIAN ASSISTANT

## 2022-10-27 VITALS — SYSTOLIC BLOOD PRESSURE: 97 MMHG | DIASTOLIC BLOOD PRESSURE: 60 MMHG | OXYGEN SATURATION: 93 % | HEART RATE: 82 BPM

## 2022-10-27 DIAGNOSIS — E11.9 TYPE 2 DIABETES MELLITUS WITHOUT COMPLICATION, WITHOUT LONG-TERM CURRENT USE OF INSULIN: ICD-10-CM

## 2022-10-27 DIAGNOSIS — E83.42 HYPOMAGNESEMIA: ICD-10-CM

## 2022-10-27 DIAGNOSIS — I50.23 ACUTE ON CHRONIC HFREF (HEART FAILURE WITH REDUCED EJECTION FRACTION): Primary | ICD-10-CM

## 2022-10-27 DIAGNOSIS — Z99.89 OSA ON CPAP: ICD-10-CM

## 2022-10-27 DIAGNOSIS — Z79.01 CHRONIC ANTICOAGULATION: ICD-10-CM

## 2022-10-27 DIAGNOSIS — I42.0 CARDIOMYOPATHY, DILATED: ICD-10-CM

## 2022-10-27 DIAGNOSIS — G47.33 OSA ON CPAP: ICD-10-CM

## 2022-10-27 LAB
ANION GAP SERPL CALCULATED.3IONS-SCNC: 14.4 MMOL/L (ref 5–15)
BUN SERPL-MCNC: 22 MG/DL (ref 6–20)
BUN/CREAT SERPL: 21.6 (ref 7–25)
CALCIUM SPEC-SCNC: 8.7 MG/DL (ref 8.6–10.5)
CHLORIDE SERPL-SCNC: 96 MMOL/L (ref 98–107)
CO2 SERPL-SCNC: 26.6 MMOL/L (ref 22–29)
CREAT SERPL-MCNC: 1.02 MG/DL (ref 0.76–1.27)
EGFRCR SERPLBLD CKD-EPI 2021: 92.4 ML/MIN/1.73
GLUCOSE SERPL-MCNC: 231 MG/DL (ref 65–99)
MAGNESIUM SERPL-MCNC: 1.3 MG/DL (ref 1.6–2.6)
NT-PROBNP SERPL-MCNC: 852.8 PG/ML (ref 0–450)
POTASSIUM SERPL-SCNC: 3.5 MMOL/L (ref 3.5–5.2)
SODIUM SERPL-SCNC: 137 MMOL/L (ref 136–145)

## 2022-10-27 PROCEDURE — 83735 ASSAY OF MAGNESIUM: CPT | Performed by: PHYSICIAN ASSISTANT

## 2022-10-27 PROCEDURE — 83880 ASSAY OF NATRIURETIC PEPTIDE: CPT

## 2022-10-27 PROCEDURE — 36415 COLL VENOUS BLD VENIPUNCTURE: CPT | Performed by: PHYSICIAN ASSISTANT

## 2022-10-27 PROCEDURE — 99215 OFFICE O/P EST HI 40 MIN: CPT | Performed by: PHYSICIAN ASSISTANT

## 2022-10-27 PROCEDURE — 80048 BASIC METABOLIC PNL TOTAL CA: CPT | Performed by: PHYSICIAN ASSISTANT

## 2022-10-27 RX ORDER — CARVEDILOL 12.5 MG/1
12.5 TABLET ORAL 2 TIMES DAILY WITH MEALS
COMMUNITY
End: 2022-12-29 | Stop reason: HOSPADM

## 2022-10-27 RX ORDER — VERICIGUAT 2.5 MG/1
2.5 TABLET, FILM COATED ORAL DAILY
Qty: 30 TABLET | Refills: 0 | Status: SHIPPED | OUTPATIENT
Start: 2022-10-27 | End: 2022-11-17 | Stop reason: SDUPTHER
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
## 2022-10-27 NOTE — PROGRESS NOTES
Heart Failure Clinic    Date: 10/27/22     Vitals:    10/27/22 1441   BP: 97/60   Pulse: 82   SpO2: 93%        Method of arrival: Other POWER CHAIR    Weighing self daily: No    Monitoring Heart Failure Zones: Yes    Today's HF Zone: Yellow     Taking medications as prescribed: Yes    Edema Yes, improving    Shortness of Air: Yes, improved    Number of pillows used at night:Recliner    Educational Materials given:  moses Good RN 10/27/22 14:41 EDT
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
## 2022-10-28 ENCOUNTER — HOSPITAL ENCOUNTER (OUTPATIENT)
Dept: NUCLEAR MEDICINE | Facility: HOSPITAL | Age: 47
Discharge: HOME | End: 2022-10-28

## 2022-10-28 ENCOUNTER — HOSPITAL ENCOUNTER (OUTPATIENT)
Dept: CARDIOLOGY | Facility: HOSPITAL | Age: 47
Discharge: HOME | End: 2022-10-28

## 2022-10-28 DIAGNOSIS — I50.42 CHRONIC COMBINED SYSTOLIC AND DIASTOLIC HEART FAILURE: ICD-10-CM

## 2022-10-28 LAB
BH CV NUCLEAR PRIOR STUDY: 3
BH CV REST NUCLEAR ISOTOPE DOSE: 10.4 MCI
BH CV STRESS BP STAGE 1: NORMAL
BH CV STRESS COMMENTS STAGE 1: NORMAL
BH CV STRESS DOSE REGADENOSON STAGE 1: 0.4
BH CV STRESS DURATION MIN STAGE 1: 0
BH CV STRESS DURATION SEC STAGE 1: 10
BH CV STRESS HR STAGE 1: 96
BH CV STRESS NUCLEAR ISOTOPE DOSE: 30.1 MCI
BH CV STRESS PROTOCOL 1: NORMAL
BH CV STRESS RECOVERY BP: NORMAL MMHG
BH CV STRESS RECOVERY HR: 98 BPM
BH CV STRESS STAGE 1: 1
LV EF NUC BP: 29 %
MAXIMAL PREDICTED HEART RATE: 175 BPM
PERCENT MAX PREDICTED HR: 54.86 %
STRESS BASELINE BP: NORMAL MMHG
STRESS BASELINE HR: 85 BPM
STRESS PERCENT HR: 65 %
STRESS POST PEAK BP: NORMAL MMHG
STRESS POST PEAK HR: 96 BPM
STRESS TARGET HR: 149 BPM

## 2022-10-28 PROCEDURE — A9500 TC99M SESTAMIBI: HCPCS | Performed by: PHYSICIAN ASSISTANT

## 2022-10-28 PROCEDURE — 93017 CV STRESS TEST TRACING ONLY: CPT

## 2022-10-28 PROCEDURE — 78452 HT MUSCLE IMAGE SPECT MULT: CPT | Performed by: INTERNAL MEDICINE

## 2022-10-28 PROCEDURE — 93018 CV STRESS TEST I&R ONLY: CPT | Performed by: INTERNAL MEDICINE

## 2022-10-28 PROCEDURE — 0 TECHNETIUM SESTAMIBI: Performed by: PHYSICIAN ASSISTANT

## 2022-10-28 PROCEDURE — 78452 HT MUSCLE IMAGE SPECT MULT: CPT

## 2022-10-28 PROCEDURE — 25010000002 REGADENOSON 0.4 MG/5ML SOLUTION: Performed by: PHYSICIAN ASSISTANT

## 2022-10-28 RX ADMIN — TECHNETIUM TC 99M SESTAMIBI 1 DOSE: 1 INJECTION INTRAVENOUS at 10:28

## 2022-10-28 RX ADMIN — TECHNETIUM TC 99M SESTAMIBI 1 DOSE: 1 INJECTION INTRAVENOUS at 09:04

## 2022-10-28 RX ADMIN — REGADENOSON 0.4 MG: 0.08 INJECTION, SOLUTION INTRAVENOUS at 10:28

## 2022-10-31 NOTE — PROGRESS NOTES
Called patient's phone number and POA answered.  Discussed results of stress test with medium to high risk study and need for further evaluation with Interventional Cards.  Added baby aspirin 81 mg and instructed brother to pick some up for patient to start (previously not started due to history of poorly healing decubitus ulcers).  Called his primary Cardiologist Dr. Aldana with recommendation for further evaluation by Interventional Cardiology.  Discussed further with Interventional Cards office for patient to be seen in office on Wednesday.

## 2022-11-02 ENCOUNTER — OFFICE VISIT (OUTPATIENT)
Dept: CARDIOLOGY | Facility: CLINIC | Age: 47
End: 2022-11-02

## 2022-11-02 VITALS
OXYGEN SATURATION: 93 % | BODY MASS INDEX: 44.1 KG/M2 | HEIGHT: 71 IN | HEART RATE: 74 BPM | DIASTOLIC BLOOD PRESSURE: 57 MMHG | WEIGHT: 315 LBS | SYSTOLIC BLOOD PRESSURE: 100 MMHG

## 2022-11-02 DIAGNOSIS — E78.5 DYSLIPIDEMIA: ICD-10-CM

## 2022-11-02 DIAGNOSIS — I50.22 CHRONIC HFREF (HEART FAILURE WITH REDUCED EJECTION FRACTION): ICD-10-CM

## 2022-11-02 DIAGNOSIS — Z86.711 HISTORY OF PULMONARY EMBOLISM: ICD-10-CM

## 2022-11-02 DIAGNOSIS — I42.0 CARDIOMYOPATHY, DILATED: Primary | ICD-10-CM

## 2022-11-02 PROCEDURE — 99213 OFFICE O/P EST LOW 20 MIN: CPT | Performed by: NURSE PRACTITIONER

## 2022-11-02 RX ORDER — ASPIRIN 81 MG/1
81 TABLET, CHEWABLE ORAL DAILY
Status: ON HOLD | COMMUNITY
End: 2022-12-10
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
## 2022-11-03 ENCOUNTER — APPOINTMENT (OUTPATIENT)
Dept: CARDIOLOGY | Facility: HOSPITAL | Age: 47
End: 2022-11-03

## 2022-11-03 RX ORDER — BUMETANIDE 2 MG/1
2 TABLET ORAL 2 TIMES DAILY
Qty: 60 TABLET | Refills: 0 | Status: SHIPPED | OUTPATIENT
Start: 2022-11-03 | End: 2022-12-08

## 2022-11-08 ENCOUNTER — DOCUMENTATION (OUTPATIENT)
Dept: CARDIOLOGY | Facility: CLINIC | Age: 47
End: 2022-11-08

## 2022-11-08 NOTE — PROGRESS NOTES
Called hematology/oncology office, patient has appointment for Tomorrow at 1pm. Patient brother notified and able to make this appointment. Stressed importance of keeping this appointment. I will follow up on Thursday to make further plan for [facility].

## 2022-11-09 ENCOUNTER — LAB (OUTPATIENT)
Dept: LAB | Facility: HOSPITAL | Age: 47
End: 2022-11-09

## 2022-11-09 ENCOUNTER — CONSULT (OUTPATIENT)
Dept: ONCOLOGY | Facility: CLINIC | Age: 47
End: 2022-11-09

## 2022-11-09 VITALS
HEART RATE: 71 BPM | OXYGEN SATURATION: 96 % | SYSTOLIC BLOOD PRESSURE: 115 MMHG | BODY MASS INDEX: 44.1 KG/M2 | WEIGHT: 315 LBS | HEIGHT: 71 IN | RESPIRATION RATE: 18 BRPM | TEMPERATURE: 97.3 F | DIASTOLIC BLOOD PRESSURE: 81 MMHG

## 2022-11-09 DIAGNOSIS — D64.9 NORMOCYTIC ANEMIA: ICD-10-CM

## 2022-11-09 DIAGNOSIS — T14.8XXA WOUND INFECTION: ICD-10-CM

## 2022-11-09 DIAGNOSIS — I26.09 ACUTE PULMONARY EMBOLISM WITH ACUTE COR PULMONALE, UNSPECIFIED PULMONARY EMBOLISM TYPE: Primary | ICD-10-CM

## 2022-11-09 DIAGNOSIS — E08.622 DIABETES MELLITUS DUE TO UNDERLYING CONDITION WITH OTHER SKIN ULCER (CODE): ICD-10-CM

## 2022-11-09 DIAGNOSIS — L08.9 WOUND INFECTION: Primary | ICD-10-CM

## 2022-11-09 DIAGNOSIS — L08.9 WOUND INFECTION: ICD-10-CM

## 2022-11-09 DIAGNOSIS — R79.1 ABNORMAL COAGULATION PROFILE: ICD-10-CM

## 2022-11-09 DIAGNOSIS — T14.8XXA WOUND INFECTION: Primary | ICD-10-CM

## 2022-11-09 DIAGNOSIS — Z88.8 HEPARIN ALLERGY: ICD-10-CM

## 2022-11-09 LAB
ALBUMIN SERPL-MCNC: 3.47 G/DL (ref 3.5–5.2)
ALBUMIN/GLOB SERPL: 0.8 G/DL
ALP SERPL-CCNC: 111 U/L (ref 39–117)
ALT SERPL W P-5'-P-CCNC: 31 U/L (ref 1–41)
ANION GAP SERPL CALCULATED.3IONS-SCNC: 12.5 MMOL/L (ref 5–15)
APTT PPP: 33 SECONDS (ref 26.5–34.5)
AST SERPL-CCNC: 19 U/L (ref 1–40)
BASOPHILS # BLD AUTO: 0.03 10*3/MM3 (ref 0–0.2)
BASOPHILS # BLD AUTO: 0.04 10*3/MM3 (ref 0–0.2)
BASOPHILS NFR BLD AUTO: 0.3 % (ref 0–1.5)
BASOPHILS NFR BLD AUTO: 0.4 % (ref 0–1.5)
BILIRUB SERPL-MCNC: 0.3 MG/DL (ref 0–1.2)
BUN SERPL-MCNC: 30 MG/DL (ref 6–20)
BUN/CREAT SERPL: 30.6 (ref 7–25)
CALCIUM SPEC-SCNC: 9.1 MG/DL (ref 8.6–10.5)
CHLORIDE SERPL-SCNC: 96 MMOL/L (ref 98–107)
CO2 SERPL-SCNC: 28.5 MMOL/L (ref 22–29)
CREAT SERPL-MCNC: 0.98 MG/DL (ref 0.76–1.27)
CRP SERPL-MCNC: 7.32 MG/DL (ref 0–0.5)
DEPRECATED RDW RBC AUTO: 50.3 FL (ref 37–54)
DEPRECATED RDW RBC AUTO: 50.8 FL (ref 37–54)
EGFRCR SERPLBLD CKD-EPI 2021: 96.9 ML/MIN/1.73
EOSINOPHIL # BLD AUTO: 0.07 10*3/MM3 (ref 0–0.4)
EOSINOPHIL # BLD AUTO: 0.08 10*3/MM3 (ref 0–0.4)
EOSINOPHIL NFR BLD AUTO: 0.7 % (ref 0.3–6.2)
EOSINOPHIL NFR BLD AUTO: 0.8 % (ref 0.3–6.2)
ERYTHROCYTE [DISTWIDTH] IN BLOOD BY AUTOMATED COUNT: 16.2 % (ref 12.3–15.4)
ERYTHROCYTE [DISTWIDTH] IN BLOOD BY AUTOMATED COUNT: 16.2 % (ref 12.3–15.4)
ERYTHROCYTE [SEDIMENTATION RATE] IN BLOOD: 90 MM/HR (ref 0–15)
FERRITIN SERPL-MCNC: 131.3 NG/ML (ref 30–400)
GLOBULIN UR ELPH-MCNC: 4.5 GM/DL
GLUCOSE SERPL-MCNC: 366 MG/DL (ref 65–99)
HBA1C MFR BLD: 8.8 % (ref 4.8–5.6)
HCT VFR BLD AUTO: 42.8 % (ref 37.5–51)
HCT VFR BLD AUTO: 43.4 % (ref 37.5–51)
HGB BLD-MCNC: 12.8 G/DL (ref 13–17.7)
HGB BLD-MCNC: 13.2 G/DL (ref 13–17.7)
IMM GRANULOCYTES # BLD AUTO: 0.02 10*3/MM3 (ref 0–0.05)
IMM GRANULOCYTES # BLD AUTO: 0.03 10*3/MM3 (ref 0–0.05)
IMM GRANULOCYTES NFR BLD AUTO: 0.2 % (ref 0–0.5)
IMM GRANULOCYTES NFR BLD AUTO: 0.3 % (ref 0–0.5)
INR PPP: 1.2 (ref 0.9–1.1)
IRON 24H UR-MRATE: 50 MCG/DL (ref 59–158)
IRON SATN MFR SERPL: 13 % (ref 20–50)
LDH SERPL-CCNC: 140 U/L (ref 135–225)
LYMPHOCYTES # BLD AUTO: 1.16 10*3/MM3 (ref 0.7–3.1)
LYMPHOCYTES # BLD AUTO: 1.19 10*3/MM3 (ref 0.7–3.1)
LYMPHOCYTES NFR BLD AUTO: 11.9 % (ref 19.6–45.3)
LYMPHOCYTES NFR BLD AUTO: 12.1 % (ref 19.6–45.3)
MCH RBC QN AUTO: 25.3 PG (ref 26.6–33)
MCH RBC QN AUTO: 25.6 PG (ref 26.6–33)
MCHC RBC AUTO-ENTMCNC: 29.9 G/DL (ref 31.5–35.7)
MCHC RBC AUTO-ENTMCNC: 30.4 G/DL (ref 31.5–35.7)
MCV RBC AUTO: 84.1 FL (ref 79–97)
MCV RBC AUTO: 84.8 FL (ref 79–97)
MONOCYTES # BLD AUTO: 0.33 10*3/MM3 (ref 0.1–0.9)
MONOCYTES # BLD AUTO: 0.33 10*3/MM3 (ref 0.1–0.9)
MONOCYTES NFR BLD AUTO: 3.3 % (ref 5–12)
MONOCYTES NFR BLD AUTO: 3.4 % (ref 5–12)
NEUTROPHILS NFR BLD AUTO: 7.99 10*3/MM3 (ref 1.7–7)
NEUTROPHILS NFR BLD AUTO: 8.34 10*3/MM3 (ref 1.7–7)
NEUTROPHILS NFR BLD AUTO: 83.1 % (ref 42.7–76)
NEUTROPHILS NFR BLD AUTO: 83.5 % (ref 42.7–76)
NRBC BLD AUTO-RTO: 0 /100 WBC (ref 0–0.2)
NRBC BLD AUTO-RTO: 0 /100 WBC (ref 0–0.2)
PLATELET # BLD AUTO: 260 10*3/MM3 (ref 140–450)
PLATELET # BLD AUTO: 265 10*3/MM3 (ref 140–450)
PMV BLD AUTO: 10.7 FL (ref 6–12)
PMV BLD AUTO: 10.8 FL (ref 6–12)
POTASSIUM SERPL-SCNC: 3.7 MMOL/L (ref 3.5–5.2)
PROT SERPL-MCNC: 8 G/DL (ref 6–8.5)
PROTHROMBIN TIME: 15.4 SECONDS (ref 12.1–14.7)
RBC # BLD AUTO: 5.05 10*6/MM3 (ref 4.14–5.8)
RBC # BLD AUTO: 5.16 10*6/MM3 (ref 4.14–5.8)
RETICS # AUTO: 0.07 10*6/MM3 (ref 0.02–0.13)
RETICS/RBC NFR AUTO: 1.29 % (ref 0.7–1.9)
SODIUM SERPL-SCNC: 137 MMOL/L (ref 136–145)
TIBC SERPL-MCNC: 378 MCG/DL (ref 298–536)
TRANSFERRIN SERPL-MCNC: 254 MG/DL (ref 200–360)
WBC NRBC COR # BLD: 9.62 10*3/MM3 (ref 3.4–10.8)
WBC NRBC COR # BLD: 9.99 10*3/MM3 (ref 3.4–10.8)

## 2022-11-09 PROCEDURE — 82728 ASSAY OF FERRITIN: CPT | Performed by: INTERNAL MEDICINE

## 2022-11-09 PROCEDURE — 85610 PROTHROMBIN TIME: CPT | Performed by: INTERNAL MEDICINE

## 2022-11-09 PROCEDURE — 82542 COL CHROMOTOGRAPHY QUAL/QUAN: CPT | Performed by: INTERNAL MEDICINE

## 2022-11-09 PROCEDURE — 36415 COLL VENOUS BLD VENIPUNCTURE: CPT | Performed by: INTERNAL MEDICINE

## 2022-11-09 PROCEDURE — 85045 AUTOMATED RETICULOCYTE COUNT: CPT | Performed by: NURSE PRACTITIONER

## 2022-11-09 PROCEDURE — 85652 RBC SED RATE AUTOMATED: CPT | Performed by: NURSE PRACTITIONER

## 2022-11-09 PROCEDURE — 85025 COMPLETE CBC W/AUTO DIFF WBC: CPT | Performed by: NURSE PRACTITIONER

## 2022-11-09 PROCEDURE — 80053 COMPREHEN METABOLIC PANEL: CPT | Performed by: INTERNAL MEDICINE

## 2022-11-09 PROCEDURE — 83036 HEMOGLOBIN GLYCOSYLATED A1C: CPT | Performed by: NURSE PRACTITIONER

## 2022-11-09 PROCEDURE — 83615 LACTATE (LD) (LDH) ENZYME: CPT | Performed by: INTERNAL MEDICINE

## 2022-11-09 PROCEDURE — 85730 THROMBOPLASTIN TIME PARTIAL: CPT | Performed by: INTERNAL MEDICINE

## 2022-11-09 PROCEDURE — 84466 ASSAY OF TRANSFERRIN: CPT | Performed by: INTERNAL MEDICINE

## 2022-11-09 PROCEDURE — 85025 COMPLETE CBC W/AUTO DIFF WBC: CPT | Performed by: INTERNAL MEDICINE

## 2022-11-09 PROCEDURE — 84134 ASSAY OF PREALBUMIN: CPT | Performed by: NURSE PRACTITIONER

## 2022-11-09 PROCEDURE — 83540 ASSAY OF IRON: CPT | Performed by: INTERNAL MEDICINE

## 2022-11-09 PROCEDURE — 86022 PLATELET ANTIBODIES: CPT | Performed by: INTERNAL MEDICINE

## 2022-11-09 PROCEDURE — 99205 OFFICE O/P NEW HI 60 MIN: CPT | Performed by: INTERNAL MEDICINE

## 2022-11-09 PROCEDURE — 86140 C-REACTIVE PROTEIN: CPT | Performed by: NURSE PRACTITIONER
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
## 2022-11-10 LAB
PF4 HEPARIN CMPLX IGG SERPL IA: 0.11 OD (ref 0–0.4)
PREALB SERPL-MCNC: 24.6 MG/DL (ref 20–40)

## 2022-11-16 ENCOUNTER — INFUSION (OUTPATIENT)
Dept: ONCOLOGY | Facility: HOSPITAL | Age: 47
End: 2022-11-16

## 2022-11-16 VITALS
DIASTOLIC BLOOD PRESSURE: 60 MMHG | HEART RATE: 71 BPM | OXYGEN SATURATION: 98 % | TEMPERATURE: 97.1 F | RESPIRATION RATE: 18 BRPM | SYSTOLIC BLOOD PRESSURE: 96 MMHG

## 2022-11-16 DIAGNOSIS — Z88.8 HEPARIN ALLERGY: ICD-10-CM

## 2022-11-16 DIAGNOSIS — I26.99 PULMONARY EMBOLISM, UNSPECIFIED CHRONICITY, UNSPECIFIED PULMONARY EMBOLISM TYPE, UNSPECIFIED WHETHER ACUTE COR PULMONALE PRESENT: ICD-10-CM

## 2022-11-16 DIAGNOSIS — D64.9 NORMOCYTIC ANEMIA: ICD-10-CM

## 2022-11-16 DIAGNOSIS — I26.09 ACUTE PULMONARY EMBOLISM WITH ACUTE COR PULMONALE, UNSPECIFIED PULMONARY EMBOLISM TYPE: Primary | ICD-10-CM

## 2022-11-16 DIAGNOSIS — R79.1 ABNORMAL COAGULATION PROFILE: ICD-10-CM

## 2022-11-16 PROCEDURE — 96372 THER/PROPH/DIAG INJ SC/IM: CPT

## 2022-11-16 PROCEDURE — 25010000002 HEPARIN (PORCINE) PER 1000 UNITS: Performed by: NURSE PRACTITIONER

## 2022-11-16 RX ORDER — HEPARIN SODIUM 5000 [USP'U]/ML
5000 INJECTION, SOLUTION INTRAVENOUS; SUBCUTANEOUS ONCE
Status: COMPLETED | OUTPATIENT
Start: 2022-11-16 | End: 2022-11-16

## 2022-11-16 RX ADMIN — HEPARIN SODIUM 5000 UNITS: 5000 INJECTION, SOLUTION INTRAVENOUS; SUBCUTANEOUS at 15:02

## 2022-11-16 NOTE — PROGRESS NOTES
Pt in clinic for heparin allergy challenge.  5000 units (1ml) Heparin for SQ injection injected SQ into Left Lower Abdomen.  No rash or discoloration noted at time of injection.  Pt monitored in clinic for 40 minutes following injection.  No skin discoloration or rash noted.  Patient denies any issues while in clinic following injection, no s/s distress/discomfort or reaction noted.  Vitals WNL.  Patient discharged via wheelchair from clinic in no distress.

## 2022-11-17 ENCOUNTER — HOSPITAL ENCOUNTER (OUTPATIENT)
Dept: CARDIOLOGY | Facility: HOSPITAL | Age: 47
Discharge: HOME | End: 2022-11-17

## 2022-11-17 VITALS
BODY MASS INDEX: 44.1 KG/M2 | DIASTOLIC BLOOD PRESSURE: 57 MMHG | HEIGHT: 71 IN | HEART RATE: 75 BPM | OXYGEN SATURATION: 98 % | SYSTOLIC BLOOD PRESSURE: 88 MMHG | WEIGHT: 315 LBS

## 2022-11-17 DIAGNOSIS — R94.39 ABNORMAL NUCLEAR STRESS TEST: ICD-10-CM

## 2022-11-17 DIAGNOSIS — I50.22 CHRONIC HFREF (HEART FAILURE WITH REDUCED EJECTION FRACTION): Primary | ICD-10-CM

## 2022-11-17 PROCEDURE — 99215 OFFICE O/P EST HI 40 MIN: CPT | Performed by: PHYSICIAN ASSISTANT

## 2022-11-17 RX ORDER — VERICIGUAT 2.5 MG/1
2.5 TABLET, FILM COATED ORAL DAILY
Qty: 30 TABLET | Refills: 0 | Status: SHIPPED | OUTPATIENT
Start: 2022-11-17 | End: 2022-12-13 | Stop reason: HOSPADM

## 2022-11-17 RX ORDER — SPIRONOLACTONE 25 MG/1
25 TABLET ORAL DAILY
Qty: 90 TABLET | Refills: 0 | Status: SHIPPED | OUTPATIENT
Start: 2022-11-17 | End: 2022-12-13 | Stop reason: HOSPADM

## 2022-11-17 RX ORDER — METOLAZONE 2.5 MG/1
2.5 TABLET ORAL 3 TIMES WEEKLY
Qty: 15 TABLET | Refills: 0 | Status: SHIPPED | OUTPATIENT
Start: 2022-11-18 | End: 2022-12-13 | Stop reason: HOSPADM

## 2022-11-17 NOTE — PROGRESS NOTES
Heart Failure Clinic    Date: 11/17/22     Vitals:    11/17/22 1514   BP: (!) 88/57   Pulse: 75   SpO2: 98%    weight: 330    Method of arrival: Other wheel chair    Weighing self daily: No    Monitoring Heart Failure Zones: No    Today's HF Zone: Yellow     Taking medications as prescribed: Yes    Edema Yes    Shortness of Air: No    Number of pillows used at night:<2    Educational Materials given:  [facility] Value: lqx3        Ruslan Hyatt Rep 11/17/22 15:20 EST
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
## 2022-11-18 ENCOUNTER — DOCUMENTATION (OUTPATIENT)
Dept: ONCOLOGY | Facility: CLINIC | Age: 47
End: 2022-11-18

## 2022-11-18 ENCOUNTER — LAB (OUTPATIENT)
Dept: ONCOLOGY | Facility: HOSPITAL | Age: 47
End: 2022-11-18

## 2022-11-18 VITALS
HEART RATE: 74 BPM | DIASTOLIC BLOOD PRESSURE: 63 MMHG | SYSTOLIC BLOOD PRESSURE: 123 MMHG | OXYGEN SATURATION: 97 % | RESPIRATION RATE: 18 BRPM | TEMPERATURE: 97.7 F

## 2022-11-18 LAB
SRA .2 IU/ML UFH SER-ACNC: 12 % (ref 0–20)
SRA 100IU/ML UFH SER-ACNC: 6 % (ref 0–20)
SRA UFH SER-IMP: NORMAL

## 2022-11-18 PROCEDURE — 85025 COMPLETE CBC W/AUTO DIFF WBC: CPT | Performed by: INTERNAL MEDICINE

## 2022-11-18 NOTE — PROGRESS NOTES
Patient was given SC heparin in our infusion clinic and closely monitored. Patient did not experience any sort of reaction. No hives, shortness of breath were notes. HIT antibody and GIDEON were also negative. Platelet count has remained stable. Two days later after heparin patient denies of any allergy symptoms from heparin and tolerated it well. Therefore I believe that it is safe for patient to receive heparin and/or Lovenox if required as he had no allergic reaction.

## 2022-11-21 DIAGNOSIS — I50.22 CHRONIC HFREF (HEART FAILURE WITH REDUCED EJECTION FRACTION): Primary | ICD-10-CM

## 2022-11-22 ENCOUNTER — TELEPHONE (OUTPATIENT)
Dept: CARDIOLOGY | Facility: CLINIC | Age: 47
End: 2022-11-22

## 2022-11-22 NOTE — TELEPHONE ENCOUNTER
Spoke with patient , he understands he will take his last dose of Eliquis on November 29th, and will start the Lovenox on Nov 30th taking a morning and night dose, and will take 1 morning dose on Dec 1st for heart Cath on Dec 2nd. I told patient to call the office if he had any questions or concerns.

## 2022-11-25 RX ORDER — ENOXAPARIN SODIUM 100 MG/ML
1 INJECTION SUBCUTANEOUS EVERY 12 HOURS SCHEDULED
Qty: 4.5 ML | Refills: 0 | Status: SHIPPED | OUTPATIENT
Start: 2022-11-30 | End: 2022-11-28

## 2022-11-28 NOTE — PROGRESS NOTES
Pharmacist at [facility] called and stated that the prescription should be rewritten so that the patient would not have to measure out 1.5 mg for each dose.  He states that Lovenox comes in 150 mg/mL.  Unfortunately this is not available in our system.  I told him this and gave him a verbal order for Lovenox 150 mg/mL every 12 hours x3, to be started November 30 a.m.

## 2022-11-30 ENCOUNTER — HOSPITAL ENCOUNTER (OUTPATIENT)
Dept: WOUND CARE | Facility: HOSPITAL | Age: 47
Discharge: HOME | End: 2022-11-30
Admitting: NURSE PRACTITIONER

## 2022-11-30 VITALS
RESPIRATION RATE: 18 BRPM | SYSTOLIC BLOOD PRESSURE: 109 MMHG | HEART RATE: 82 BPM | TEMPERATURE: 98.7 F | DIASTOLIC BLOOD PRESSURE: 75 MMHG

## 2022-11-30 PROCEDURE — 97602 WOUND(S) CARE NON-SELECTIVE: CPT

## 2022-12-01 ENCOUNTER — APPOINTMENT (OUTPATIENT)
Dept: MRI IMAGING | Facility: HOSPITAL | Age: 47
End: 2022-12-01

## 2022-12-01 ENCOUNTER — LAB (OUTPATIENT)
Dept: ONCOLOGY | Facility: CLINIC | Age: 47
End: 2022-12-01

## 2022-12-01 DIAGNOSIS — I26.09 ACUTE PULMONARY EMBOLISM WITH ACUTE COR PULMONALE, UNSPECIFIED PULMONARY EMBOLISM TYPE: Primary | ICD-10-CM

## 2022-12-01 LAB
BASOPHILS # BLD AUTO: 0.02 10*3/MM3 (ref 0–0.2)
BASOPHILS NFR BLD AUTO: 0.2 % (ref 0–1.5)
DEPRECATED RDW RBC AUTO: 48.6 FL (ref 37–54)
EOSINOPHIL # BLD AUTO: 0.07 10*3/MM3 (ref 0–0.4)
EOSINOPHIL NFR BLD AUTO: 0.8 % (ref 0.3–6.2)
ERYTHROCYTE [DISTWIDTH] IN BLOOD BY AUTOMATED COUNT: 15.7 % (ref 12.3–15.4)
HCT VFR BLD AUTO: 41.7 % (ref 37.5–51)
HGB BLD-MCNC: 12.9 G/DL (ref 13–17.7)
IMM GRANULOCYTES # BLD AUTO: 0.02 10*3/MM3 (ref 0–0.05)
IMM GRANULOCYTES NFR BLD AUTO: 0.2 % (ref 0–0.5)
LYMPHOCYTES # BLD AUTO: 1.64 10*3/MM3 (ref 0.7–3.1)
LYMPHOCYTES NFR BLD AUTO: 19.1 % (ref 19.6–45.3)
MCH RBC QN AUTO: 26.4 PG (ref 26.6–33)
MCHC RBC AUTO-ENTMCNC: 30.9 G/DL (ref 31.5–35.7)
MCV RBC AUTO: 85.3 FL (ref 79–97)
MONOCYTES # BLD AUTO: 0.26 10*3/MM3 (ref 0.1–0.9)
MONOCYTES NFR BLD AUTO: 3 % (ref 5–12)
NEUTROPHILS NFR BLD AUTO: 6.59 10*3/MM3 (ref 1.7–7)
NEUTROPHILS NFR BLD AUTO: 76.7 % (ref 42.7–76)
NRBC BLD AUTO-RTO: 0 /100 WBC (ref 0–0.2)
PLATELET # BLD AUTO: 298 10*3/MM3 (ref 140–450)
PMV BLD AUTO: 10.5 FL (ref 6–12)
RBC # BLD AUTO: 4.89 10*6/MM3 (ref 4.14–5.8)
WBC NRBC COR # BLD: 8.6 10*3/MM3 (ref 3.4–10.8)

## 2022-12-01 PROCEDURE — 36415 COLL VENOUS BLD VENIPUNCTURE: CPT | Performed by: NURSE PRACTITIONER

## 2022-12-01 PROCEDURE — 85025 COMPLETE CBC W/AUTO DIFF WBC: CPT | Performed by: NURSE PRACTITIONER

## 2022-12-02 ENCOUNTER — HOSPITAL ENCOUNTER (OUTPATIENT)
Facility: HOSPITAL | Age: 47
Setting detail: HOSPITAL OUTPATIENT SURGERY
Discharge: HOME | End: 2022-12-02
Attending: INTERNAL MEDICINE | Admitting: INTERNAL MEDICINE

## 2022-12-02 VITALS
OXYGEN SATURATION: 96 % | TEMPERATURE: 97.8 F | BODY MASS INDEX: 44.1 KG/M2 | WEIGHT: 315 LBS | SYSTOLIC BLOOD PRESSURE: 127 MMHG | HEIGHT: 71 IN | HEART RATE: 83 BPM | DIASTOLIC BLOOD PRESSURE: 84 MMHG | RESPIRATION RATE: 18 BRPM

## 2022-12-02 DIAGNOSIS — I50.22 CHRONIC HFREF (HEART FAILURE WITH REDUCED EJECTION FRACTION): ICD-10-CM

## 2022-12-02 LAB
ANION GAP SERPL CALCULATED.3IONS-SCNC: 12.3 MMOL/L (ref 5–15)
BUN SERPL-MCNC: 37 MG/DL (ref 6–20)
BUN/CREAT SERPL: 30.8 (ref 7–25)
CALCIUM SPEC-SCNC: 9.2 MG/DL (ref 8.6–10.5)
CHLORIDE SERPL-SCNC: 91 MMOL/L (ref 98–107)
CO2 SERPL-SCNC: 27.7 MMOL/L (ref 22–29)
CREAT SERPL-MCNC: 1.2 MG/DL (ref 0.76–1.27)
DEPRECATED RDW RBC AUTO: 48.8 FL (ref 37–54)
EGFRCR SERPLBLD CKD-EPI 2021: 76 ML/MIN/1.73
ERYTHROCYTE [DISTWIDTH] IN BLOOD BY AUTOMATED COUNT: 15.6 % (ref 12.3–15.4)
GLUCOSE BLDC GLUCOMTR-MCNC: 383 MG/DL (ref 70–130)
GLUCOSE BLDC GLUCOMTR-MCNC: 431 MG/DL (ref 70–130)
GLUCOSE BLDC GLUCOMTR-MCNC: 471 MG/DL (ref 70–130)
GLUCOSE SERPL-MCNC: 482 MG/DL (ref 65–99)
HCT VFR BLD AUTO: 41.2 % (ref 37.5–51)
HGB BLD-MCNC: 12.7 G/DL (ref 13–17.7)
MCH RBC QN AUTO: 26.5 PG (ref 26.6–33)
MCHC RBC AUTO-ENTMCNC: 30.8 G/DL (ref 31.5–35.7)
MCV RBC AUTO: 85.8 FL (ref 79–97)
PLATELET # BLD AUTO: 275 10*3/MM3 (ref 140–450)
PMV BLD AUTO: 10.3 FL (ref 6–12)
POTASSIUM SERPL-SCNC: 3.6 MMOL/L (ref 3.5–5.2)
RBC # BLD AUTO: 4.8 10*6/MM3 (ref 4.14–5.8)
SODIUM SERPL-SCNC: 131 MMOL/L (ref 136–145)
WBC NRBC COR # BLD: 8.63 10*3/MM3 (ref 3.4–10.8)

## 2022-12-02 PROCEDURE — 99152 MOD SED SAME PHYS/QHP 5/>YRS: CPT | Performed by: INTERNAL MEDICINE

## 2022-12-02 PROCEDURE — C1894 INTRO/SHEATH, NON-LASER: HCPCS | Performed by: INTERNAL MEDICINE

## 2022-12-02 PROCEDURE — 85027 COMPLETE CBC AUTOMATED: CPT | Performed by: INTERNAL MEDICINE

## 2022-12-02 PROCEDURE — 25010000002 FENTANYL CITRATE (PF) 50 MCG/ML SOLUTION: Performed by: INTERNAL MEDICINE

## 2022-12-02 PROCEDURE — 0 IOPAMIDOL PER 1 ML: Performed by: INTERNAL MEDICINE

## 2022-12-02 PROCEDURE — 93454 CORONARY ARTERY ANGIO S&I: CPT | Performed by: INTERNAL MEDICINE

## 2022-12-02 PROCEDURE — 82962 GLUCOSE BLOOD TEST: CPT

## 2022-12-02 PROCEDURE — 80048 BASIC METABOLIC PNL TOTAL CA: CPT | Performed by: INTERNAL MEDICINE

## 2022-12-02 PROCEDURE — S0260 H&P FOR SURGERY: HCPCS | Performed by: INTERNAL MEDICINE

## 2022-12-02 PROCEDURE — 25010000002 MIDAZOLAM PER 1 MG: Performed by: INTERNAL MEDICINE

## 2022-12-02 PROCEDURE — 63710000001 INSULIN REGULAR HUMAN PER 5 UNITS: Performed by: INTERNAL MEDICINE

## 2022-12-02 PROCEDURE — 25010000002 HEPARIN (PORCINE) PER 1000 UNITS: Performed by: INTERNAL MEDICINE

## 2022-12-02 PROCEDURE — C1769 GUIDE WIRE: HCPCS | Performed by: INTERNAL MEDICINE

## 2022-12-02 RX ORDER — SODIUM CHLORIDE 9 MG/ML
INJECTION, SOLUTION INTRAVENOUS
Status: COMPLETED | OUTPATIENT
Start: 2022-12-02 | End: 2022-12-02

## 2022-12-02 RX ORDER — LIDOCAINE HYDROCHLORIDE 20 MG/ML
INJECTION, SOLUTION INFILTRATION; PERINEURAL
Status: DISCONTINUED | OUTPATIENT
Start: 2022-12-02 | End: 2022-12-02 | Stop reason: HOSPADM

## 2022-12-02 RX ORDER — HEPARIN SODIUM 1000 [USP'U]/ML
INJECTION, SOLUTION INTRAVENOUS; SUBCUTANEOUS
Status: DISCONTINUED | OUTPATIENT
Start: 2022-12-02 | End: 2022-12-02 | Stop reason: HOSPADM

## 2022-12-02 RX ORDER — DICYCLOMINE HYDROCHLORIDE 10 MG/1
10 CAPSULE ORAL 2 TIMES DAILY
Qty: 30 CAPSULE | Refills: 3
Start: 2022-12-02

## 2022-12-02 RX ORDER — FENTANYL CITRATE 50 UG/ML
INJECTION, SOLUTION INTRAMUSCULAR; INTRAVENOUS
Status: DISCONTINUED | OUTPATIENT
Start: 2022-12-02 | End: 2022-12-02 | Stop reason: HOSPADM

## 2022-12-02 RX ORDER — VERAPAMIL HYDROCHLORIDE 2.5 MG/ML
INJECTION, SOLUTION INTRAVENOUS
Status: DISCONTINUED | OUTPATIENT
Start: 2022-12-02 | End: 2022-12-02 | Stop reason: HOSPADM

## 2022-12-02 RX ORDER — SODIUM CHLORIDE 9 MG/ML
100 INJECTION, SOLUTION INTRAVENOUS CONTINUOUS
Status: DISCONTINUED | OUTPATIENT
Start: 2022-12-02 | End: 2022-12-02 | Stop reason: HOSPADM

## 2022-12-02 RX ORDER — MIDAZOLAM HYDROCHLORIDE 1 MG/ML
INJECTION INTRAMUSCULAR; INTRAVENOUS
Status: DISCONTINUED | OUTPATIENT
Start: 2022-12-02 | End: 2022-12-02 | Stop reason: HOSPADM

## 2022-12-02 RX ADMIN — INSULIN HUMAN 10 UNITS: 100 INJECTION, SOLUTION PARENTERAL at 12:44

## 2022-12-02 RX ADMIN — INSULIN HUMAN 10 UNITS: 100 INJECTION, SOLUTION PARENTERAL at 08:18

## 2022-12-02 RX ADMIN — INSULIN HUMAN 10 UNITS: 100 INJECTION, SOLUTION PARENTERAL at 10:38

## 2022-12-02 NOTE — NURSING NOTE
Cardiac rehab referral received on pt.  No qualifying dx noted at this time.  Qualifications for CR include coronary stenting, AMI (NSTEM or STEMI), stable angina, CABG, heart valve repair/replacment, heart transplant or stable CHF (with these specific qualifications, Left Ventricular Ejection Fraction of 35% or less, NYHA class II to IV symptoms despite optimal heart failure therapy for at least 6 weeks and has not had recent (?6 Weeks) or planned (? 6 months) major cardiovascular hospitalizations or procedures.  Please contact us at [phone] with questions.
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
## 2022-12-02 NOTE — NURSING NOTE
BG was 422 and 471mg/dL.  Dr. Brerios aware and orders treatment with regular insulin.  See orders.

## 2022-12-08 RX ORDER — BUMETANIDE 2 MG/1
2 TABLET ORAL 2 TIMES DAILY
Qty: 60 TABLET | Refills: 0 | Status: SHIPPED | OUTPATIENT
Start: 2022-12-08 | End: 2022-12-13 | Stop reason: HOSPADM

## 2022-12-09 ENCOUNTER — HOSPITAL ENCOUNTER (INPATIENT)
Facility: HOSPITAL | Age: 47
LOS: 4 days | Discharge: HOME | End: 2022-12-13
Attending: EMERGENCY MEDICINE | Admitting: HOSPITALIST

## 2022-12-09 ENCOUNTER — APPOINTMENT (OUTPATIENT)
Dept: GENERAL RADIOLOGY | Facility: HOSPITAL | Age: 47
End: 2022-12-09

## 2022-12-09 ENCOUNTER — APPOINTMENT (OUTPATIENT)
Dept: CT IMAGING | Facility: HOSPITAL | Age: 47
End: 2022-12-09

## 2022-12-09 DIAGNOSIS — E11.00 HYPEROSMOLAR HYPERGLYCEMIC STATE (HHS): Primary | ICD-10-CM

## 2022-12-09 DIAGNOSIS — N39.0 UTI (URINARY TRACT INFECTION), UNCOMPLICATED: ICD-10-CM

## 2022-12-09 DIAGNOSIS — A41.9 SEPSIS, DUE TO UNSPECIFIED ORGANISM, UNSPECIFIED WHETHER ACUTE ORGAN DYSFUNCTION PRESENT: ICD-10-CM

## 2022-12-09 PROBLEM — E11.65 HYPERGLYCEMIA DUE TO DIABETES MELLITUS: Status: ACTIVE | Noted: 2022-12-09

## 2022-12-09 LAB
A-A DO2: 10.8 MMHG (ref 0–300)
ACETONE BLD QL: NEGATIVE
ALBUMIN SERPL-MCNC: 3.55 G/DL (ref 3.5–5.2)
ALBUMIN/GLOB SERPL: 0.7 G/DL
ALP SERPL-CCNC: 133 U/L (ref 39–117)
ALT SERPL W P-5'-P-CCNC: 34 U/L (ref 1–41)
ANION GAP SERPL CALCULATED.3IONS-SCNC: 13.1 MMOL/L (ref 5–15)
ARTERIAL PATENCY WRIST A: POSITIVE
AST SERPL-CCNC: 18 U/L (ref 1–40)
ATMOSPHERIC PRESS: 728 MMHG
ATMOSPHERIC PRESS: 728 MMHG
BACTERIA UR QL AUTO: ABNORMAL /HPF
BASE EXCESS BLDA CALC-SCNC: -3.7 MMOL/L (ref 0–2)
BASE EXCESS BLDV CALC-SCNC: -5.6 MMOL/L (ref 0–2)
BASOPHILS # BLD AUTO: 0.03 10*3/MM3 (ref 0–0.2)
BASOPHILS NFR BLD AUTO: 0.3 % (ref 0–1.5)
BDY SITE: ABNORMAL
BDY SITE: ABNORMAL
BILIRUB SERPL-MCNC: 0.2 MG/DL (ref 0–1.2)
BILIRUB UR QL STRIP: NEGATIVE
BODY TEMPERATURE: 0 C
BODY TEMPERATURE: 0 C
BUN SERPL-MCNC: 34 MG/DL (ref 6–20)
BUN/CREAT SERPL: 23.8 (ref 7–25)
CALCIUM SPEC-SCNC: 9.5 MG/DL (ref 8.6–10.5)
CHLORIDE SERPL-SCNC: 96 MMOL/L (ref 98–107)
CLARITY UR: CLEAR
CO2 BLDA-SCNC: 22.9 MMOL/L (ref 22–33)
CO2 BLDA-SCNC: 23.8 MMOL/L (ref 22–33)
CO2 SERPL-SCNC: 21.9 MMOL/L (ref 22–29)
COHGB MFR BLD: 1.1 % (ref 0–5)
COHGB MFR BLD: 1.2 % (ref 0–5)
COLOR UR: YELLOW
CREAT SERPL-MCNC: 1.43 MG/DL (ref 0.76–1.27)
CRP SERPL-MCNC: 9.68 MG/DL (ref 0–0.5)
D-LACTATE SERPL-SCNC: 2.5 MMOL/L (ref 0.5–2)
D-LACTATE SERPL-SCNC: 4.4 MMOL/L (ref 0.5–2)
DEPRECATED RDW RBC AUTO: 49 FL (ref 37–54)
EGFRCR SERPLBLD CKD-EPI 2021: 61.6 ML/MIN/1.73
EOSINOPHIL # BLD AUTO: 0.11 10*3/MM3 (ref 0–0.4)
EOSINOPHIL NFR BLD AUTO: 1.1 % (ref 0.3–6.2)
ERYTHROCYTE [DISTWIDTH] IN BLOOD BY AUTOMATED COUNT: 15.5 % (ref 12.3–15.4)
ERYTHROCYTE [SEDIMENTATION RATE] IN BLOOD: 101 MM/HR (ref 0–15)
FLUAV SUBTYP SPEC NAA+PROBE: NOT DETECTED
FLUBV RNA ISLT QL NAA+PROBE: NOT DETECTED
GLOBULIN UR ELPH-MCNC: 4.9 GM/DL
GLUCOSE BLDC GLUCOMTR-MCNC: 402 MG/DL (ref 70–130)
GLUCOSE BLDC GLUCOMTR-MCNC: 446 MG/DL (ref 70–130)
GLUCOSE SERPL-MCNC: 622 MG/DL (ref 65–99)
GLUCOSE UR STRIP-MCNC: ABNORMAL MG/DL
HBA1C MFR BLD: 11.8 % (ref 4.8–5.6)
HCO3 BLDA-SCNC: 22.4 MMOL/L (ref 20–26)
HCO3 BLDV-SCNC: 21.4 MMOL/L (ref 22–28)
HCT VFR BLD AUTO: 40.1 % (ref 37.5–51)
HCT VFR BLD CALC: 36.4 % (ref 38–51)
HGB BLD-MCNC: 12.4 G/DL (ref 13–17.7)
HGB BLDA-MCNC: 11.9 G/DL (ref 14–18)
HGB BLDA-MCNC: 12.4 G/DL (ref 14–18)
HGB UR QL STRIP.AUTO: ABNORMAL
HOLD SPECIMEN: NORMAL
HOLD SPECIMEN: NORMAL
HYALINE CASTS UR QL AUTO: ABNORMAL /LPF
IMM GRANULOCYTES # BLD AUTO: 0.03 10*3/MM3 (ref 0–0.05)
IMM GRANULOCYTES NFR BLD AUTO: 0.3 % (ref 0–0.5)
INHALED O2 CONCENTRATION: 21 %
INHALED O2 CONCENTRATION: 21 %
KETONES UR QL STRIP: NEGATIVE
LEUKOCYTE ESTERASE UR QL STRIP.AUTO: ABNORMAL
LYMPHOCYTES # BLD AUTO: 1.33 10*3/MM3 (ref 0.7–3.1)
LYMPHOCYTES NFR BLD AUTO: 13.5 % (ref 19.6–45.3)
Lab: ABNORMAL
Lab: ABNORMAL
MCH RBC QN AUTO: 26.8 PG (ref 26.6–33)
MCHC RBC AUTO-ENTMCNC: 30.9 G/DL (ref 31.5–35.7)
MCV RBC AUTO: 86.8 FL (ref 79–97)
METHGB BLD QL: 0.4 % (ref 0–3)
METHGB BLD QL: 0.6 % (ref 0–3)
MODALITY: ABNORMAL
MODALITY: ABNORMAL
MONOCYTES # BLD AUTO: 0.37 10*3/MM3 (ref 0.1–0.9)
MONOCYTES NFR BLD AUTO: 3.7 % (ref 5–12)
NEUTROPHILS NFR BLD AUTO: 8 10*3/MM3 (ref 1.7–7)
NEUTROPHILS NFR BLD AUTO: 81.1 % (ref 42.7–76)
NITRITE UR QL STRIP: NEGATIVE
NOTE: ABNORMAL
NRBC BLD AUTO-RTO: 0 /100 WBC (ref 0–0.2)
NT-PROBNP SERPL-MCNC: 1056 PG/ML (ref 0–450)
OXYHGB MFR BLDV: 69.7 % (ref 45–75)
OXYHGB MFR BLDV: 94.3 % (ref 94–99)
PCO2 BLDA: 44.2 MM HG (ref 35–45)
PCO2 BLDV: 47.2 MM HG (ref 41–51)
PCO2 TEMP ADJ BLD: ABNORMAL MM[HG]
PH BLDA: 7.31 PH UNITS (ref 7.35–7.45)
PH BLDV: 7.26 PH UNITS (ref 7.32–7.42)
PH UR STRIP.AUTO: 7.5 [PH] (ref 5–8)
PH, TEMP CORRECTED: ABNORMAL
PLATELET # BLD AUTO: 289 10*3/MM3 (ref 140–450)
PMV BLD AUTO: 10.4 FL (ref 6–12)
PO2 BLDA: 82.3 MM HG (ref 83–108)
PO2 BLDV: 43 MM HG (ref 27–53)
PO2 TEMP ADJ BLD: ABNORMAL MM[HG]
POTASSIUM SERPL-SCNC: 4.7 MMOL/L (ref 3.5–5.2)
PROCALCITONIN SERPL-MCNC: 0.13 NG/ML (ref 0–0.25)
PROT SERPL-MCNC: 8.4 G/DL (ref 6–8.5)
PROT UR QL STRIP: NEGATIVE
RBC # BLD AUTO: 4.62 10*6/MM3 (ref 4.14–5.8)
RBC # UR STRIP: ABNORMAL /HPF
REF LAB TEST METHOD: ABNORMAL
SAO2 % BLDCOA: 95.7 % (ref 94–99)
SAO2 % BLDCOV: 71 % (ref 45–75)
SARS-COV-2 RNA PNL SPEC NAA+PROBE: NOT DETECTED
SODIUM SERPL-SCNC: 131 MMOL/L (ref 136–145)
SP GR UR STRIP: 1.01 (ref 1–1.03)
SQUAMOUS #/AREA URNS HPF: ABNORMAL /HPF
UROBILINOGEN UR QL STRIP: ABNORMAL
VENTILATOR MODE: ABNORMAL
WBC # UR STRIP: ABNORMAL /HPF
WBC NRBC COR # BLD: 9.87 10*3/MM3 (ref 3.4–10.8)
WHOLE BLOOD HOLD COAG: NORMAL
WHOLE BLOOD HOLD SPECIMEN: NORMAL

## 2022-12-09 PROCEDURE — 82962 GLUCOSE BLOOD TEST: CPT

## 2022-12-09 PROCEDURE — 83050 HGB METHEMOGLOBIN QUAN: CPT

## 2022-12-09 PROCEDURE — 63710000001 INSULIN DETEMIR PER 5 UNITS: Performed by: HOSPITALIST

## 2022-12-09 PROCEDURE — 85652 RBC SED RATE AUTOMATED: CPT | Performed by: NURSE PRACTITIONER

## 2022-12-09 PROCEDURE — 63710000001 INSULIN REGULAR HUMAN PER 5 UNITS: Performed by: PHYSICIAN ASSISTANT

## 2022-12-09 PROCEDURE — 82805 BLOOD GASES W/O2 SATURATION: CPT

## 2022-12-09 PROCEDURE — 83036 HEMOGLOBIN GLYCOSYLATED A1C: CPT | Performed by: HOSPITALIST

## 2022-12-09 PROCEDURE — 82375 ASSAY CARBOXYHB QUANT: CPT

## 2022-12-09 PROCEDURE — 83605 ASSAY OF LACTIC ACID: CPT | Performed by: PHYSICIAN ASSISTANT

## 2022-12-09 PROCEDURE — 87086 URINE CULTURE/COLONY COUNT: CPT | Performed by: NURSE PRACTITIONER

## 2022-12-09 PROCEDURE — 63710000001 INSULIN ASPART PER 5 UNITS: Performed by: HOSPITALIST

## 2022-12-09 PROCEDURE — 71045 X-RAY EXAM CHEST 1 VIEW: CPT

## 2022-12-09 PROCEDURE — 80053 COMPREHEN METABOLIC PANEL: CPT | Performed by: NURSE PRACTITIONER

## 2022-12-09 PROCEDURE — 82009 KETONE BODYS QUAL: CPT | Performed by: NURSE PRACTITIONER

## 2022-12-09 PROCEDURE — 84145 PROCALCITONIN (PCT): CPT | Performed by: HOSPITALIST

## 2022-12-09 PROCEDURE — 71045 X-RAY EXAM CHEST 1 VIEW: CPT | Performed by: RADIOLOGY

## 2022-12-09 PROCEDURE — 82820 HEMOGLOBIN-OXYGEN AFFINITY: CPT

## 2022-12-09 PROCEDURE — 74176 CT ABD & PELVIS W/O CONTRAST: CPT

## 2022-12-09 PROCEDURE — 85025 COMPLETE CBC W/AUTO DIFF WBC: CPT | Performed by: NURSE PRACTITIONER

## 2022-12-09 PROCEDURE — 86140 C-REACTIVE PROTEIN: CPT | Performed by: NURSE PRACTITIONER

## 2022-12-09 PROCEDURE — 87636 SARSCOV2 & INF A&B AMP PRB: CPT | Performed by: PHYSICIAN ASSISTANT

## 2022-12-09 PROCEDURE — 83880 ASSAY OF NATRIURETIC PEPTIDE: CPT | Performed by: PHYSICIAN ASSISTANT

## 2022-12-09 PROCEDURE — 99223 1ST HOSP IP/OBS HIGH 75: CPT | Performed by: HOSPITALIST

## 2022-12-09 PROCEDURE — 81001 URINALYSIS AUTO W/SCOPE: CPT | Performed by: NURSE PRACTITIONER

## 2022-12-09 PROCEDURE — 99284 EMERGENCY DEPT VISIT MOD MDM: CPT

## 2022-12-09 PROCEDURE — 87040 BLOOD CULTURE FOR BACTERIA: CPT | Performed by: PHYSICIAN ASSISTANT

## 2022-12-09 PROCEDURE — 36600 WITHDRAWAL OF ARTERIAL BLOOD: CPT

## 2022-12-09 PROCEDURE — 36415 COLL VENOUS BLD VENIPUNCTURE: CPT

## 2022-12-09 PROCEDURE — 25010000002 CEFTRIAXONE PER 250 MG: Performed by: PHYSICIAN ASSISTANT

## 2022-12-09 RX ORDER — NITROGLYCERIN 0.4 MG/1
0.4 TABLET SUBLINGUAL
Status: DISCONTINUED | OUTPATIENT
Start: 2022-12-09 | End: 2022-12-13 | Stop reason: HOSPADM

## 2022-12-09 RX ORDER — SODIUM CHLORIDE 0.9 % (FLUSH) 0.9 %
10 SYRINGE (ML) INJECTION AS NEEDED
Status: DISCONTINUED | OUTPATIENT
Start: 2022-12-09 | End: 2022-12-13 | Stop reason: HOSPADM

## 2022-12-09 RX ORDER — SODIUM CHLORIDE 9 MG/ML
50 INJECTION, SOLUTION INTRAVENOUS CONTINUOUS
Status: DISCONTINUED | OUTPATIENT
Start: 2022-12-09 | End: 2022-12-12

## 2022-12-09 RX ORDER — SODIUM CHLORIDE 0.9 % (FLUSH) 0.9 %
10 SYRINGE (ML) INJECTION EVERY 12 HOURS SCHEDULED
Status: DISCONTINUED | OUTPATIENT
Start: 2022-12-09 | End: 2022-12-13 | Stop reason: HOSPADM

## 2022-12-09 RX ORDER — NICOTINE POLACRILEX 4 MG
15 LOZENGE BUCCAL
Status: DISCONTINUED | OUTPATIENT
Start: 2022-12-09 | End: 2022-12-13 | Stop reason: HOSPADM

## 2022-12-09 RX ORDER — DEXTROSE MONOHYDRATE 25 G/50ML
25 INJECTION, SOLUTION INTRAVENOUS
Status: DISCONTINUED | OUTPATIENT
Start: 2022-12-09 | End: 2022-12-13 | Stop reason: HOSPADM

## 2022-12-09 RX ORDER — INSULIN ASPART 100 [IU]/ML
0-14 INJECTION, SOLUTION INTRAVENOUS; SUBCUTANEOUS
Status: DISCONTINUED | OUTPATIENT
Start: 2022-12-09 | End: 2022-12-10

## 2022-12-09 RX ORDER — SODIUM CHLORIDE 9 MG/ML
40 INJECTION, SOLUTION INTRAVENOUS AS NEEDED
Status: DISCONTINUED | OUTPATIENT
Start: 2022-12-09 | End: 2022-12-13 | Stop reason: HOSPADM

## 2022-12-09 RX ADMIN — INSULIN ASPART 14 UNITS: 100 INJECTION, SOLUTION INTRAVENOUS; SUBCUTANEOUS at 21:30

## 2022-12-09 RX ADMIN — INSULIN DETEMIR 10 UNITS: 100 INJECTION, SOLUTION SUBCUTANEOUS at 21:31

## 2022-12-09 RX ADMIN — CEFTRIAXONE 1 G: 1 INJECTION, POWDER, FOR SOLUTION INTRAMUSCULAR; INTRAVENOUS at 20:55

## 2022-12-09 RX ADMIN — SODIUM CHLORIDE 50 ML/HR: 9 INJECTION, SOLUTION INTRAVENOUS at 22:55

## 2022-12-09 RX ADMIN — HUMAN INSULIN 10 UNITS: 100 INJECTION, SOLUTION SUBCUTANEOUS at 17:28

## 2022-12-09 RX ADMIN — SODIUM CHLORIDE 1000 ML: 9 INJECTION, SOLUTION INTRAVENOUS at 17:27
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
## 2022-12-09 NOTE — ED NOTES
MEDICAL SCREENING:    Reason for Visit: Hyperglycemia     Patient initially seen in triage.  The patient was advised further evaluation and diagnostic testing will be needed, some of the treatment and testing will be initiated in the lobby in order to begin the process.  The patient will be returned to the waiting area for the time being and possibly be re-assessed by a subsequent ED provider.  The patient will be brought back to the treatment area in as timely manner as possible.       Rosemarie Cotter, APRN  12/09/22 1446

## 2022-12-10 ENCOUNTER — APPOINTMENT (OUTPATIENT)
Dept: ULTRASOUND IMAGING | Facility: HOSPITAL | Age: 47
End: 2022-12-10

## 2022-12-10 ENCOUNTER — APPOINTMENT (OUTPATIENT)
Dept: GENERAL RADIOLOGY | Facility: HOSPITAL | Age: 47
End: 2022-12-10

## 2022-12-10 LAB
A-A DO2: 32.8 MMHG (ref 0–300)
ALBUMIN SERPL-MCNC: 2.92 G/DL (ref 3.5–5.2)
ALBUMIN/GLOB SERPL: 0.7 G/DL
ALP SERPL-CCNC: 123 U/L (ref 39–117)
ALT SERPL W P-5'-P-CCNC: 34 U/L (ref 1–41)
ANION GAP SERPL CALCULATED.3IONS-SCNC: 12.9 MMOL/L (ref 5–15)
ARTERIAL PATENCY WRIST A: POSITIVE
AST SERPL-CCNC: 23 U/L (ref 1–40)
ATMOSPHERIC PRESS: 729 MMHG
BACTERIA SPEC AEROBE CULT: NORMAL
BASE EXCESS BLDA CALC-SCNC: -2.2 MMOL/L (ref 0–2)
BASOPHILS # BLD AUTO: 0.04 10*3/MM3 (ref 0–0.2)
BASOPHILS NFR BLD AUTO: 0.4 % (ref 0–1.5)
BDY SITE: ABNORMAL
BILIRUB SERPL-MCNC: 0.2 MG/DL (ref 0–1.2)
BODY TEMPERATURE: 0 C
BUN SERPL-MCNC: 32 MG/DL (ref 6–20)
BUN/CREAT SERPL: 26 (ref 7–25)
CALCIUM SPEC-SCNC: 8.8 MG/DL (ref 8.6–10.5)
CHLORIDE SERPL-SCNC: 100 MMOL/L (ref 98–107)
CO2 BLDA-SCNC: 25.3 MMOL/L (ref 22–33)
CO2 SERPL-SCNC: 19.1 MMOL/L (ref 22–29)
COHGB MFR BLD: 1.2 % (ref 0–5)
CREAT SERPL-MCNC: 1.23 MG/DL (ref 0.76–1.27)
CRP SERPL-MCNC: 7.93 MG/DL (ref 0–0.5)
D-LACTATE SERPL-SCNC: 2 MMOL/L (ref 0.5–2)
D-LACTATE SERPL-SCNC: 2.6 MMOL/L (ref 0.5–2)
DEPRECATED RDW RBC AUTO: 48.9 FL (ref 37–54)
EGFRCR SERPLBLD CKD-EPI 2021: 73.8 ML/MIN/1.73
EOSINOPHIL # BLD AUTO: 0.18 10*3/MM3 (ref 0–0.4)
EOSINOPHIL NFR BLD AUTO: 1.6 % (ref 0.3–6.2)
ERYTHROCYTE [DISTWIDTH] IN BLOOD BY AUTOMATED COUNT: 15.4 % (ref 12.3–15.4)
GLOBULIN UR ELPH-MCNC: 4.5 GM/DL
GLUCOSE BLDC GLUCOMTR-MCNC: 310 MG/DL (ref 70–130)
GLUCOSE BLDC GLUCOMTR-MCNC: 326 MG/DL (ref 70–130)
GLUCOSE BLDC GLUCOMTR-MCNC: 350 MG/DL (ref 70–130)
GLUCOSE BLDC GLUCOMTR-MCNC: 351 MG/DL (ref 70–130)
GLUCOSE BLDC GLUCOMTR-MCNC: 385 MG/DL (ref 70–130)
GLUCOSE BLDC GLUCOMTR-MCNC: 437 MG/DL (ref 70–130)
GLUCOSE BLDC GLUCOMTR-MCNC: 446 MG/DL (ref 70–130)
GLUCOSE SERPL-MCNC: 419 MG/DL (ref 65–99)
HCO3 BLDA-SCNC: 23.9 MMOL/L (ref 20–26)
HCT VFR BLD AUTO: 38.2 % (ref 37.5–51)
HCT VFR BLD CALC: 35 % (ref 38–51)
HGB BLD-MCNC: 11.8 G/DL (ref 13–17.7)
HGB BLDA-MCNC: 11.4 G/DL (ref 14–18)
IMM GRANULOCYTES # BLD AUTO: 0.07 10*3/MM3 (ref 0–0.05)
IMM GRANULOCYTES NFR BLD AUTO: 0.6 % (ref 0–0.5)
INHALED O2 CONCENTRATION: 21 %
LYMPHOCYTES # BLD AUTO: 2.4 10*3/MM3 (ref 0.7–3.1)
LYMPHOCYTES NFR BLD AUTO: 21.2 % (ref 19.6–45.3)
Lab: ABNORMAL
MCH RBC QN AUTO: 26.9 PG (ref 26.6–33)
MCHC RBC AUTO-ENTMCNC: 30.9 G/DL (ref 31.5–35.7)
MCV RBC AUTO: 87.2 FL (ref 79–97)
METHGB BLD QL: 0.2 % (ref 0–3)
MODALITY: ABNORMAL
MONOCYTES # BLD AUTO: 0.47 10*3/MM3 (ref 0.1–0.9)
MONOCYTES NFR BLD AUTO: 4.2 % (ref 5–12)
NEUTROPHILS NFR BLD AUTO: 72 % (ref 42.7–76)
NEUTROPHILS NFR BLD AUTO: 8.16 10*3/MM3 (ref 1.7–7)
NOTE: ABNORMAL
NRBC BLD AUTO-RTO: 0 /100 WBC (ref 0–0.2)
OXYHGB MFR BLDV: 87.8 % (ref 94–99)
PCO2 BLDA: 45.6 MM HG (ref 35–45)
PCO2 TEMP ADJ BLD: ABNORMAL MM[HG]
PH BLDA: 7.33 PH UNITS (ref 7.35–7.45)
PH, TEMP CORRECTED: ABNORMAL
PLATELET # BLD AUTO: 283 10*3/MM3 (ref 140–450)
PMV BLD AUTO: 10.5 FL (ref 6–12)
PO2 BLDA: 59 MM HG (ref 83–108)
PO2 TEMP ADJ BLD: ABNORMAL MM[HG]
POTASSIUM SERPL-SCNC: 3.9 MMOL/L (ref 3.5–5.2)
PROT SERPL-MCNC: 7.4 G/DL (ref 6–8.5)
RBC # BLD AUTO: 4.38 10*6/MM3 (ref 4.14–5.8)
SAO2 % BLDCOA: 89 % (ref 94–99)
SODIUM SERPL-SCNC: 132 MMOL/L (ref 136–145)
VENTILATOR MODE: ABNORMAL
WBC NRBC COR # BLD: 11.32 10*3/MM3 (ref 3.4–10.8)

## 2022-12-10 PROCEDURE — 63710000001 INSULIN ASPART PER 5 UNITS: Performed by: PHYSICIAN ASSISTANT

## 2022-12-10 PROCEDURE — 63710000001 INSULIN ASPART PER 5 UNITS: Performed by: HOSPITALIST

## 2022-12-10 PROCEDURE — 85025 COMPLETE CBC W/AUTO DIFF WBC: CPT | Performed by: HOSPITALIST

## 2022-12-10 PROCEDURE — 36600 WITHDRAWAL OF ARTERIAL BLOOD: CPT

## 2022-12-10 PROCEDURE — 82375 ASSAY CARBOXYHB QUANT: CPT

## 2022-12-10 PROCEDURE — 25010000002 VANCOMYCIN 1 G RECONSTITUTED SOLUTION 1 EACH VIAL: Performed by: HOSPITALIST

## 2022-12-10 PROCEDURE — 80053 COMPREHEN METABOLIC PANEL: CPT | Performed by: HOSPITALIST

## 2022-12-10 PROCEDURE — 73630 X-RAY EXAM OF FOOT: CPT

## 2022-12-10 PROCEDURE — 82962 GLUCOSE BLOOD TEST: CPT

## 2022-12-10 PROCEDURE — 99232 SBSQ HOSP IP/OBS MODERATE 35: CPT | Performed by: STUDENT IN AN ORGANIZED HEALTH CARE EDUCATION/TRAINING PROGRAM

## 2022-12-10 PROCEDURE — 25010000002 VANCOMYCIN 5 G RECONSTITUTED SOLUTION: Performed by: HOSPITALIST

## 2022-12-10 PROCEDURE — 63710000001 INSULIN DETEMIR PER 5 UNITS: Performed by: HOSPITALIST

## 2022-12-10 PROCEDURE — 63710000001 INSULIN ASPART PER 5 UNITS: Performed by: STUDENT IN AN ORGANIZED HEALTH CARE EDUCATION/TRAINING PROGRAM

## 2022-12-10 PROCEDURE — 82805 BLOOD GASES W/O2 SATURATION: CPT

## 2022-12-10 PROCEDURE — 25010000002 PIPERACILLIN SOD-TAZOBACTAM PER 1 G: Performed by: HOSPITALIST

## 2022-12-10 PROCEDURE — 83605 ASSAY OF LACTIC ACID: CPT | Performed by: PHYSICIAN ASSISTANT

## 2022-12-10 PROCEDURE — 93926 LOWER EXTREMITY STUDY: CPT

## 2022-12-10 PROCEDURE — 83050 HGB METHEMOGLOBIN QUAN: CPT

## 2022-12-10 PROCEDURE — 99222 1ST HOSP IP/OBS MODERATE 55: CPT | Performed by: SURGERY

## 2022-12-10 PROCEDURE — 86140 C-REACTIVE PROTEIN: CPT | Performed by: HOSPITALIST

## 2022-12-10 RX ORDER — L.ACID,CASEI/B.ANIMAL/S.THERMO 16B CELL
1 CAPSULE ORAL DAILY
COMMUNITY

## 2022-12-10 RX ORDER — FERROUS SULFATE 325(65) MG
325 TABLET ORAL 2 TIMES DAILY
COMMUNITY

## 2022-12-10 RX ORDER — ARIPIPRAZOLE 10 MG/1
10 TABLET ORAL NIGHTLY
Status: DISCONTINUED | OUTPATIENT
Start: 2022-12-10 | End: 2022-12-13 | Stop reason: HOSPADM

## 2022-12-10 RX ORDER — NICOTINE POLACRILEX 4 MG
15 LOZENGE BUCCAL
Status: DISCONTINUED | OUTPATIENT
Start: 2022-12-10 | End: 2022-12-13 | Stop reason: HOSPADM

## 2022-12-10 RX ORDER — ASCORBIC ACID 500 MG
500 TABLET ORAL DAILY
Status: DISCONTINUED | OUTPATIENT
Start: 2022-12-10 | End: 2022-12-13 | Stop reason: HOSPADM

## 2022-12-10 RX ORDER — MULTIPLE VITAMINS W/ MINERALS TAB 9MG-400MCG
1 TAB ORAL DAILY
Status: CANCELLED | OUTPATIENT
Start: 2022-12-10

## 2022-12-10 RX ORDER — SPIRONOLACTONE 25 MG/1
25 TABLET ORAL DAILY
Status: CANCELLED | OUTPATIENT
Start: 2022-12-10

## 2022-12-10 RX ORDER — DULOXETIN HYDROCHLORIDE 60 MG/1
60 CAPSULE, DELAYED RELEASE ORAL 2 TIMES DAILY
Status: DISCONTINUED | OUTPATIENT
Start: 2022-12-10 | End: 2022-12-13 | Stop reason: HOSPADM

## 2022-12-10 RX ORDER — FERROUS SULFATE 325(65) MG
325 TABLET ORAL
Status: DISCONTINUED | OUTPATIENT
Start: 2022-12-11 | End: 2022-12-13 | Stop reason: HOSPADM

## 2022-12-10 RX ORDER — DICYCLOMINE HYDROCHLORIDE 10 MG/1
10 CAPSULE ORAL 2 TIMES DAILY
Status: DISCONTINUED | OUTPATIENT
Start: 2022-12-10 | End: 2022-12-13 | Stop reason: HOSPADM

## 2022-12-10 RX ORDER — MULTIPLE VITAMINS W/ MINERALS TAB 9MG-400MCG
1 TAB ORAL DAILY
Status: DISCONTINUED | OUTPATIENT
Start: 2022-12-10 | End: 2022-12-13 | Stop reason: HOSPADM

## 2022-12-10 RX ORDER — SULFAMETHOXAZOLE AND TRIMETHOPRIM 800; 160 MG/1; MG/1
1 TABLET ORAL 2 TIMES DAILY
COMMUNITY
End: 2022-12-13 | Stop reason: HOSPADM

## 2022-12-10 RX ORDER — ASPIRIN 81 MG/1
81 TABLET ORAL DAILY
Status: DISCONTINUED | OUTPATIENT
Start: 2022-12-10 | End: 2022-12-13 | Stop reason: HOSPADM

## 2022-12-10 RX ORDER — SUCRALFATE 1 G/1
1 TABLET ORAL DAILY
Status: CANCELLED | OUTPATIENT
Start: 2022-12-10

## 2022-12-10 RX ORDER — INSULIN ASPART 100 [IU]/ML
10 INJECTION, SOLUTION INTRAVENOUS; SUBCUTANEOUS
Status: DISCONTINUED | OUTPATIENT
Start: 2022-12-10 | End: 2022-12-12

## 2022-12-10 RX ORDER — SUCRALFATE 1 G/1
1 TABLET ORAL DAILY
COMMUNITY

## 2022-12-10 RX ORDER — INSULIN ASPART 100 [IU]/ML
15 INJECTION, SOLUTION INTRAVENOUS; SUBCUTANEOUS 2 TIMES DAILY
Status: DISCONTINUED | OUTPATIENT
Start: 2022-12-10 | End: 2022-12-10

## 2022-12-10 RX ORDER — ASPIRIN 81 MG/1
81 TABLET ORAL DAILY
COMMUNITY
End: 2023-04-04 | Stop reason: HOSPADM

## 2022-12-10 RX ORDER — GABAPENTIN 400 MG/1
800 CAPSULE ORAL EVERY 8 HOURS SCHEDULED
Status: DISCONTINUED | OUTPATIENT
Start: 2022-12-10 | End: 2022-12-13 | Stop reason: HOSPADM

## 2022-12-10 RX ORDER — INSULIN ASPART 100 [IU]/ML
7 INJECTION, SOLUTION INTRAVENOUS; SUBCUTANEOUS ONCE
Status: COMPLETED | OUTPATIENT
Start: 2022-12-10 | End: 2022-12-10

## 2022-12-10 RX ORDER — PANTOPRAZOLE SODIUM 40 MG/1
40 TABLET, DELAYED RELEASE ORAL EVERY MORNING
Status: CANCELLED | OUTPATIENT
Start: 2022-12-10

## 2022-12-10 RX ORDER — OMEGA-3S/DHA/EPA/FISH OIL/D3 300MG-1000
1000 CAPSULE ORAL DAILY
Status: DISCONTINUED | OUTPATIENT
Start: 2022-12-10 | End: 2022-12-13 | Stop reason: HOSPADM

## 2022-12-10 RX ORDER — BUMETANIDE 1 MG/1
2 TABLET ORAL 2 TIMES DAILY
Status: DISCONTINUED | OUTPATIENT
Start: 2022-12-10 | End: 2022-12-10

## 2022-12-10 RX ORDER — METHENAMINE HIPPURATE 1000 MG/1
1 TABLET ORAL 2 TIMES DAILY WITH MEALS
COMMUNITY

## 2022-12-10 RX ORDER — ATORVASTATIN CALCIUM 10 MG/1
10 TABLET, FILM COATED ORAL NIGHTLY
Status: CANCELLED | OUTPATIENT
Start: 2022-12-10

## 2022-12-10 RX ORDER — ATORVASTATIN CALCIUM 10 MG/1
10 TABLET, FILM COATED ORAL NIGHTLY
Status: DISCONTINUED | OUTPATIENT
Start: 2022-12-10 | End: 2022-12-13 | Stop reason: HOSPADM

## 2022-12-10 RX ORDER — METOLAZONE 2.5 MG/1
2.5 TABLET ORAL 3 TIMES WEEKLY
Status: CANCELLED | OUTPATIENT
Start: 2022-12-12

## 2022-12-10 RX ORDER — MODAFINIL 100 MG/1
200 TABLET ORAL DAILY
Status: DISCONTINUED | OUTPATIENT
Start: 2022-12-10 | End: 2022-12-13 | Stop reason: HOSPADM

## 2022-12-10 RX ORDER — DULOXETIN HYDROCHLORIDE 60 MG/1
60 CAPSULE, DELAYED RELEASE ORAL 2 TIMES DAILY
Status: CANCELLED | OUTPATIENT
Start: 2022-12-10

## 2022-12-10 RX ORDER — METHENAMINE HIPPURATE 1000 MG/1
1 TABLET ORAL 2 TIMES DAILY WITH MEALS
Status: DISCONTINUED | OUTPATIENT
Start: 2022-12-10 | End: 2022-12-10 | Stop reason: RX

## 2022-12-10 RX ORDER — OMEGA-3S/DHA/EPA/FISH OIL/D3 300MG-1000
1000 CAPSULE ORAL DAILY
Status: CANCELLED | OUTPATIENT
Start: 2022-12-10

## 2022-12-10 RX ORDER — DEXTROSE MONOHYDRATE 25 G/50ML
25 INJECTION, SOLUTION INTRAVENOUS
Status: DISCONTINUED | OUTPATIENT
Start: 2022-12-10 | End: 2022-12-13 | Stop reason: HOSPADM

## 2022-12-10 RX ORDER — INSULIN ASPART 100 [IU]/ML
0-24 INJECTION, SOLUTION INTRAVENOUS; SUBCUTANEOUS
Status: DISCONTINUED | OUTPATIENT
Start: 2022-12-10 | End: 2022-12-13 | Stop reason: HOSPADM

## 2022-12-10 RX ORDER — CARVEDILOL 6.25 MG/1
12.5 TABLET ORAL 2 TIMES DAILY WITH MEALS
Status: DISCONTINUED | OUTPATIENT
Start: 2022-12-10 | End: 2022-12-13 | Stop reason: HOSPADM

## 2022-12-10 RX ORDER — GARLIC EXTRACT 500 MG
1 CAPSULE ORAL DAILY
Status: DISCONTINUED | OUTPATIENT
Start: 2022-12-10 | End: 2022-12-13 | Stop reason: HOSPADM

## 2022-12-10 RX ORDER — SPIRONOLACTONE 25 MG/1
25 TABLET ORAL DAILY
Status: DISCONTINUED | OUTPATIENT
Start: 2022-12-11 | End: 2022-12-10

## 2022-12-10 RX ORDER — GABAPENTIN 400 MG/1
400 CAPSULE ORAL EVERY 8 HOURS SCHEDULED
Status: CANCELLED | OUTPATIENT
Start: 2022-12-10

## 2022-12-10 RX ORDER — SUCRALFATE 1 G/1
1 TABLET ORAL DAILY
Status: DISCONTINUED | OUTPATIENT
Start: 2022-12-10 | End: 2022-12-13 | Stop reason: HOSPADM

## 2022-12-10 RX ORDER — DICYCLOMINE HYDROCHLORIDE 10 MG/1
10 CAPSULE ORAL 2 TIMES DAILY
Status: CANCELLED | OUTPATIENT
Start: 2022-12-10

## 2022-12-10 RX ORDER — ARIPIPRAZOLE 10 MG/1
10 TABLET ORAL NIGHTLY
Status: CANCELLED | OUTPATIENT
Start: 2022-12-10

## 2022-12-10 RX ORDER — INSULIN ASPART 100 [IU]/ML
15 INJECTION, SOLUTION INTRAVENOUS; SUBCUTANEOUS 2 TIMES DAILY
Status: CANCELLED | OUTPATIENT
Start: 2022-12-10

## 2022-12-10 RX ORDER — L.ACID,CASEI/B.ANIMAL/S.THERMO 16B CELL
1 CAPSULE ORAL DAILY
Status: CANCELLED | OUTPATIENT
Start: 2022-12-10

## 2022-12-10 RX ORDER — BUMETANIDE 1 MG/1
2 TABLET ORAL 2 TIMES DAILY
Status: CANCELLED | OUTPATIENT
Start: 2022-12-10

## 2022-12-10 RX ORDER — MULTIPLE VITAMINS W/ MINERALS TAB 9MG-400MCG
1 TAB ORAL DAILY
COMMUNITY

## 2022-12-10 RX ORDER — CARVEDILOL 6.25 MG/1
12.5 TABLET ORAL 2 TIMES DAILY WITH MEALS
Status: CANCELLED | OUTPATIENT
Start: 2022-12-10

## 2022-12-10 RX ORDER — MODAFINIL 100 MG/1
200 TABLET ORAL DAILY
Status: CANCELLED | OUTPATIENT
Start: 2022-12-10

## 2022-12-10 RX ORDER — FERROUS SULFATE 325(65) MG
325 TABLET ORAL
Status: CANCELLED | OUTPATIENT
Start: 2022-12-10

## 2022-12-10 RX ORDER — ASCORBIC ACID 500 MG
500 TABLET ORAL DAILY
Status: CANCELLED | OUTPATIENT
Start: 2022-12-10

## 2022-12-10 RX ORDER — METHENAMINE HIPPURATE 1000 MG/1
1 TABLET ORAL 2 TIMES DAILY WITH MEALS
Status: CANCELLED | OUTPATIENT
Start: 2022-12-10

## 2022-12-10 RX ORDER — BACLOFEN 10 MG/1
30 TABLET ORAL
Status: CANCELLED | OUTPATIENT
Start: 2022-12-10

## 2022-12-10 RX ORDER — PANTOPRAZOLE SODIUM 40 MG/1
40 TABLET, DELAYED RELEASE ORAL EVERY MORNING
Status: DISCONTINUED | OUTPATIENT
Start: 2022-12-11 | End: 2022-12-13 | Stop reason: HOSPADM

## 2022-12-10 RX ORDER — ASPIRIN 81 MG/1
81 TABLET ORAL DAILY
Status: CANCELLED | OUTPATIENT
Start: 2022-12-10

## 2022-12-10 RX ORDER — BACLOFEN 10 MG/1
30 TABLET ORAL
Status: DISCONTINUED | OUTPATIENT
Start: 2022-12-10 | End: 2022-12-13 | Stop reason: HOSPADM

## 2022-12-10 RX ORDER — METOLAZONE 2.5 MG/1
2.5 TABLET ORAL 3 TIMES WEEKLY
Status: DISCONTINUED | OUTPATIENT
Start: 2022-12-12 | End: 2022-12-10

## 2022-12-10 RX ORDER — INSULIN ASPART 100 [IU]/ML
10 INJECTION, SOLUTION INTRAVENOUS; SUBCUTANEOUS ONCE
Status: COMPLETED | OUTPATIENT
Start: 2022-12-10 | End: 2022-12-10

## 2022-12-10 RX ADMIN — PIPERACILLIN SODIUM AND TAZOBACTAM SODIUM 4.5 G: 4; .5 INJECTION, POWDER, LYOPHILIZED, FOR SOLUTION INTRAVENOUS at 01:32

## 2022-12-10 RX ADMIN — BACLOFEN 30 MG: 10 TABLET ORAL at 18:31

## 2022-12-10 RX ADMIN — INSULIN ASPART 10 UNITS: 100 INJECTION, SOLUTION INTRAVENOUS; SUBCUTANEOUS at 05:12

## 2022-12-10 RX ADMIN — ATORVASTATIN CALCIUM 10 MG: 10 TABLET, FILM COATED ORAL at 21:09

## 2022-12-10 RX ADMIN — Medication 1 CAPSULE: at 22:04

## 2022-12-10 RX ADMIN — CARVEDILOL 12.5 MG: 6.25 TABLET, FILM COATED ORAL at 18:31

## 2022-12-10 RX ADMIN — INSULIN ASPART 20 UNITS: 100 INJECTION, SOLUTION INTRAVENOUS; SUBCUTANEOUS at 08:12

## 2022-12-10 RX ADMIN — INSULIN ASPART 7 UNITS: 100 INJECTION, SOLUTION INTRAVENOUS; SUBCUTANEOUS at 02:31

## 2022-12-10 RX ADMIN — ARIPIPRAZOLE 10 MG: 10 TABLET ORAL at 21:10

## 2022-12-10 RX ADMIN — Medication 1 TABLET: at 18:31

## 2022-12-10 RX ADMIN — GABAPENTIN 800 MG: 400 CAPSULE ORAL at 18:31

## 2022-12-10 RX ADMIN — INSULIN DETEMIR 20 UNITS: 100 INJECTION, SOLUTION SUBCUTANEOUS at 21:09

## 2022-12-10 RX ADMIN — VANCOMYCIN HYDROCHLORIDE 2000 MG: 5 INJECTION, POWDER, LYOPHILIZED, FOR SOLUTION INTRAVENOUS at 02:31

## 2022-12-10 RX ADMIN — CHOLECALCIFEROL TAB 10 MCG (400 UNIT) 1000 UNITS: 10 TAB at 18:32

## 2022-12-10 RX ADMIN — APIXABAN 5 MG: 5 TABLET, FILM COATED ORAL at 18:32

## 2022-12-10 RX ADMIN — PIPERACILLIN SODIUM AND TAZOBACTAM SODIUM 4.5 G: 4; .5 INJECTION, POWDER, LYOPHILIZED, FOR SOLUTION INTRAVENOUS at 15:23

## 2022-12-10 RX ADMIN — INSULIN ASPART 16 UNITS: 100 INJECTION, SOLUTION INTRAVENOUS; SUBCUTANEOUS at 18:30

## 2022-12-10 RX ADMIN — MODAFINIL 200 MG: 100 TABLET ORAL at 18:32

## 2022-12-10 RX ADMIN — SUCRALFATE 1 G: 1 TABLET ORAL at 18:31

## 2022-12-10 RX ADMIN — MAGNESIUM GLUCONATE 500 MG ORAL TABLET 1200 MG: 500 TABLET ORAL at 18:32

## 2022-12-10 RX ADMIN — COLLAGENASE SANTYL 1 APPLICATION: 250 OINTMENT TOPICAL at 21:10

## 2022-12-10 RX ADMIN — OXYCODONE HYDROCHLORIDE AND ACETAMINOPHEN 500 MG: 500 TABLET ORAL at 18:32

## 2022-12-10 RX ADMIN — INSULIN ASPART 20 UNITS: 100 INJECTION, SOLUTION INTRAVENOUS; SUBCUTANEOUS at 21:09

## 2022-12-10 RX ADMIN — DULOXETINE HYDROCHLORIDE 60 MG: 60 CAPSULE, DELAYED RELEASE ORAL at 21:09

## 2022-12-10 RX ADMIN — INSULIN DETEMIR 20 UNITS: 100 INJECTION, SOLUTION SUBCUTANEOUS at 08:12

## 2022-12-10 RX ADMIN — BACLOFEN 30 MG: 10 TABLET ORAL at 21:09

## 2022-12-10 RX ADMIN — VANCOMYCIN HYDROCHLORIDE 1000 MG: 1 INJECTION, POWDER, LYOPHILIZED, FOR SOLUTION INTRAVENOUS at 13:55

## 2022-12-10 RX ADMIN — PIPERACILLIN SODIUM AND TAZOBACTAM SODIUM 4.5 G: 4; .5 INJECTION, POWDER, LYOPHILIZED, FOR SOLUTION INTRAVENOUS at 05:12

## 2022-12-10 RX ADMIN — Medication 10 ML: at 08:12

## 2022-12-10 RX ADMIN — INSULIN ASPART 20 UNITS: 100 INJECTION, SOLUTION INTRAVENOUS; SUBCUTANEOUS at 12:07

## 2022-12-10 RX ADMIN — COLLAGENASE SANTYL 1 APPLICATION: 250 OINTMENT TOPICAL at 15:23

## 2022-12-10 RX ADMIN — INSULIN ASPART 10 UNITS: 100 INJECTION, SOLUTION INTRAVENOUS; SUBCUTANEOUS at 18:31

## 2022-12-10 RX ADMIN — Medication 10 ML: at 21:10

## 2022-12-10 RX ADMIN — ASPIRIN 81 MG: 81 TABLET, COATED ORAL at 18:31

## 2022-12-10 RX ADMIN — PIPERACILLIN SODIUM AND TAZOBACTAM SODIUM 4.5 G: 4; .5 INJECTION, POWDER, LYOPHILIZED, FOR SOLUTION INTRAVENOUS at 21:10

## 2022-12-10 NOTE — PLAN OF CARE
Goal Outcome Evaluation:  Plan of Care Reviewed With: patient           Outcome Evaluation: Pt resting in bed at this time. BS elevated throughout shift, MD aware, see orders.  VSS at this time.  No complaints or requests.  Will continue to follow plan of care.

## 2022-12-10 NOTE — PROGRESS NOTES
[facility] HOSPITALIST PROGRESS NOTE     Patient Identification:  Name:  Ken Krueger  Age:  47 y.o.  Sex:  male  :  1975  MRN:  [id number]  Visit Number:  [id number]  ROOM: [address] [place] [address]     Primary Care Provider:  Franchesca Hodges APRN    Length of stay in inpatient status:  1    Subjective     Chief Compliant:    Chief Complaint   Patient presents with   • Hyperglycemia       History of Presenting Illness:    Patient seen and examined this morning. He reports he is feeling better than when he first came to the ED, since his blood sugar is not as high now. He denies shortness of breath or chest pain. Reports he cannot feel from his chest down due to the spinal cord injury.    Objective     Current Hospital Meds:Acidophilus/Pectin, 1 capsule, Oral, Daily  apixaban, 5 mg, Oral, Q12H  ARIPiprazole, 10 mg, Oral, Nightly  ascorbic acid, 500 mg, Oral, Daily  aspirin, 81 mg, Oral, Daily  atorvastatin, 10 mg, Oral, Nightly  baclofen, 30 mg, Oral, 4x Daily With Meals & Nightly  carvedilol, 12.5 mg, Oral, BID With Meals  cholecalciferol, 1,000 Units, Oral, Daily  collagenase, 1 application, Topical, Q12H  dicyclomine, 10 mg, Oral, BID  DULoxetine, 60 mg, Oral, BID  [START ON 2022] ferrous sulfate, 325 mg, Oral, Daily With Breakfast  gabapentin, 800 mg, Oral, Q8H  Insulin Aspart, 0-24 Units, Subcutaneous, 4x Daily AC & at Bedtime  insulin aspart, 10 Units, Subcutaneous, TID With Meals  insulin detemir, 20 Units, Subcutaneous, Q12H  magnesium oxide, 1,200 mg, Oral, Daily  modafinil, 200 mg, Oral, Daily  multivitamin with minerals, 1 tablet, Oral, Daily  [START ON 2022] pantoprazole, 40 mg, Oral, QAM  piperacillin-tazobactam, 4.5 g, Intravenous, Q8H  sodium chloride, 10 mL, Intravenous, Q12H  sucralfate, 1 g, Oral, Daily  vancomycin, 1,000 mg, Intravenous, Q12H    sodium chloride, 50 mL/hr, Last Rate: 50 mL/hr (22 4047)        Current Antimicrobial Therapy:  Anti-Infectives (From admission,  onward)    Ordered     Dose/Rate Route Frequency Start Stop    12/09/22 2322  vancomycin (VANCOCIN) 1,000 mg in sodium chloride 0.9 % 250 mL IVPB        Ordering Provider: Carlin Landers MD    1,000 mg  over 60 Minutes Intravenous Every 12 Hours 12/10/22 1300 12/15/22 1259    12/09/22 2317  piperacillin-tazobactam (ZOSYN) IVPB 4.5 g in 100 mL NS VTB        Ordering Provider: Carlin Landers MD    4.5 g  over 4 Hours Intravenous Every 8 Hours 12/10/22 0600 12/15/22 0559    12/09/22 2319  vancomycin 2000 mg/500 mL 0.9% NS IVPB ([facility])        Ordering Provider: Carlin Landers MD    2,000 mg  over 120 Minutes Intravenous Once 12/10/22 0100 12/10/22 0431    12/09/22 2317  piperacillin-tazobactam (ZOSYN) IVPB 4.5 g in 100 mL NS VTB        Ordering Provider: Carlin Landers MD    4.5 g  over 30 Minutes Intravenous Once 12/10/22 0030 12/10/22 0202    12/09/22 2003  cefTRIAXone (ROCEPHIN) 1 g in sodium chloride 0.9 % 100 mL IVPB-VTB        Ordering Provider: Adeola Parada PA-C    1 g  200 mL/hr over 30 Minutes Intravenous Once 12/09/22 2005 12/09/22 2125        Current Diuretic Therapy:  Diuretics (From admission, onward)    None        ----------------------------------------------------------------------------------------------------------------------  Vital Signs:  Temp:  [97.4 °F (36.3 °C)-98.3 °F (36.8 °C)] 98.1 °F (36.7 °C)  Heart Rate:  [90-99] 92  Resp:  [20] 20  BP: ()/(50-79) 121/78  SpO2:  [91 %-98 %] 94 %  on  Flow (L/min):  [2] 2;   Device (Oxygen Therapy): nasal cannula  Body mass index is 43.42 kg/m².    Wt Readings from Last 3 Encounters:   12/09/22 (!) 141 kg (311 lb 4.8 oz)   12/02/22 (!) 150 kg (330 lb)   11/17/22 (!) 150 kg (330 lb)     Intake & Output (last 3 days)       12/07 0701  12/08 0700 12/08 0701  12/09 0700 12/09 0701  12/10 0700 12/10 0701  12/11 0700    P.O.    460    IV Piggyback   1100     Total Intake(mL/kg)   1100 (7.8) 460 (3.3)    Urine (mL/kg/hr)    1000 1500 (0.9)    Stool    3    Total Output   1000 1503    Net   +100 1046                Diet: Regular/House Diet, Diabetic Diets; Consistent Carbohydrate; Texture: Regular Texture (IDDSI 7); Fluid Consistency: Thin (IDDSI 0)  ----------------------------------------------------------------------------------------------------------------------  Physical exam:   Constitutional:  Well-developed and well-nourished.  No acute distress.      HENT:  Head:  Normocephalic and atraumatic.    Cardiovascular:  Normal rate, regular rhythm and normal heart sounds with no murmur.  Pulmonary/Chest:  No respiratory distress, no wheezes, no crackles, with normal breath sounds and good air movement.  Abdominal:  Soft. No distension    Musculoskeletal:   No red or swollen joints anywhere.    Neurological:  Awake, alert, paraplegic at baseline  Skin:  Skin is warm and dry. No rash noted.   Peripheral vascular:  No cyanosis, 1+ edema right lower extremity  Psychiatric: Appropriate mood and affect  Edited by: Darlyn Wade DO at 12/10/2022 1835  ----------------------------------------------------------------------------------------------------------------------  Results from last 7 days   Lab Units 12/10/22  0357 12/10/22  0053 12/09/22 2200 12/09/22 2038 12/09/22  1554   CRP mg/dL  --  7.93*  --   --  9.68*   LACTATE mmol/L 2.0 2.6* 4.4*   < >  --    WBC 10*3/mm3  --  11.32*  --   --  9.87   HEMOGLOBIN g/dL  --  11.8*  --   --  12.4*   HEMATOCRIT %  --  38.2  --   --  40.1   MCV fL  --  87.2  --   --  86.8   MCHC g/dL  --  30.9*  --   --  30.9*   PLATELETS 10*3/mm3  --  283  --   --  289    < > = values in this interval not displayed.     Results from last 7 days   Lab Units 12/10/22  0409   PH, ARTERIAL pH units 7.327*   PO2 ART mm Hg 59.0*   PCO2, ARTERIAL mm Hg 45.6*   HCO3 ART mmol/L 23.9     Results from last 7 days   Lab Units 12/10/22  0053 12/09/22  1554   SODIUM mmol/L 132* 131*   POTASSIUM mmol/L 3.9 4.7   CHLORIDE  mmol/L 100 96*   CO2 mmol/L 19.1* 21.9*   BUN mg/dL 32* 34*   CREATININE mg/dL 1.23 1.43*   CALCIUM mg/dL 8.8 9.5   GLUCOSE mg/dL 419* 622*   ALBUMIN g/dL 2.92* 3.55   BILIRUBIN mg/dL 0.2 0.2   ALK PHOS U/L 123* 133*   AST (SGOT) U/L 23 18   ALT (SGPT) U/L 34 34   Estimated Creatinine Clearance: 109.4 mL/min (by C-G formula based on SCr of 1.23 mg/dL).  No results found for: AMMONIA      Results from last 7 days   Lab Units 12/09/22  1554   PROBNP pg/mL 1,056.0*         Hemoglobin A1C   Date/Time Value Ref Range Status   12/09/2022 1554 11.80 (H) 4.80 - 5.60 % Final     Glucose   Date/Time Value Ref Range Status   12/10/2022 1634 310 (H) 70 - 130 mg/dL Final     Comment:     Meter: [id number] : [id number] Familia Lee   12/10/2022 1118 385 (H) 70 - 130 mg/dL Final     Comment:     Meter: [id number] : [id number] Familia Lee   12/10/2022 0858 326 (H) 70 - 130 mg/dL Final     Comment:     Meter: [id number] : [id number] Familia Lee   12/10/2022 0657 350 (H) 70 - 130 mg/dL Final     Comment:     Meter: [id number] : [id number] Familia Lee   12/10/2022 0400 437 (C) 70 - 130 mg/dL Final     Comment:     Treated Patient Meter: [id number] : [id number] MANNY FARDELIA   12/10/2022 0131 446 (C) 70 - 130 mg/dL Final     Comment:     Treated Patient Meter: [id number] : [id number] MANNY WILLSON   12/09/2022 2057 402 (C) 70 - 130 mg/dL Final     Comment:     Result Not Confirmed Meter: [id number] : [id number] INGE HARRIS   12/09/2022 1832 446 (C) 70 - 130 mg/dL Final     Comment:     RN Notified Meter: [id number] : [id number] INGE HARRIS     Lab Results   Component Value Date    TSH 3.780 07/19/2022    FREET4 1.31 06/02/2021     No results found for: PREGTESTUR, PREGSERUM, HCG, HCGQUANT  Pain Management Panel     Pain Management Panel Latest Ref Rng & Units 6/2/2021 8/19/2018    AMPHETAMINES SCREEN, URINE Negative Negative Negative    BARBITURATES SCREEN Negative Negative Negative     BENZODIAZEPINE SCREEN, URINE Negative Negative Negative    BUPRENORPHINEUR Negative Negative Negative    COCAINE SCREEN, URINE Negative Negative Negative    METHADONE SCREEN, URINE Negative Negative Negative        Brief Urine Lab Results  (Last result in the past 365 days)      Color   Clarity   Blood   Leuk Est   Nitrite   Protein   CREAT   Urine HCG        12/09/22 1928 Yellow   Clear   Trace   Small (1+)   Negative   Negative               No results found for: BLOODCX  Urine Culture   Date Value Ref Range Status   12/09/2022 >100,000 CFU/mL Mixed Mariza Isolated  Final     No results found for: WOUNDCX  No results found for: STOOLCX  No results found for: RESPCX  No results found for: AFBCX  Results from last 7 days   Lab Units 12/10/22  0357 12/10/22  0053 12/09/22 2200 12/09/22 2038 12/09/22  1554   PROCALCITONIN ng/mL  --   --   --   --  0.13   LACTATE mmol/L 2.0 2.6* 4.4* 2.5*  --    SED RATE mm/hr  --   --   --   --  101*   CRP mg/dL  --  7.93*  --   --  9.68*       I have personally looked at the labs and they are summarized above.  ----------------------------------------------------------------------------------------------------------------------  Detailed radiology reports for the last 24 hours:  Imaging Results (Last 24 Hours)     Procedure Component Value Units Date/Time    XR Foot 3+ View Right [[id number]] Collected: 12/10/22 1058     Updated: 12/10/22 1100    Narrative:      CR Foot Comp Min 3 Vws RT    INDICATION:   Right heel and lateral malleolar ulcer. Type II diabetic.    COMPARISON:   None available    FINDINGS:   AP, lateral, and oblique views of the right foot.  No acute fracture or dislocation. Old healed distal tibial and fibular fractures with some residual deformity.  Generalized osteopenia but no focal periostitis or bone destruction.  Extensive soft tissue  swelling over the dorsum of the foot and anterior ankle but no soft tissue gas or foreign body. Suspected small ulcer  posterior heel.      Impression:      Small nonpenetrating ulcer along the posterior heel. No plain film findings to suggest osteomyelitis.    Extensive dorsal foot and anterior ankle soft tissue swelling compatible with cellulitis.    Signer Name: LARRY Wade MD   Signed: 12/10/2022 10:58 AM   Workstation Name: [email or web address]    [facility]    US Arterial Doppler Lower Extremity Right [[id number]] Collected: 12/10/22 0943     Updated: 12/10/22 0946    Narrative:      RIGHT LOWER EXTREMITY ARTERIAL DOPPLER IMAGING, 12/10/2022    HISTORY:    47-year-old male with history of diabetes, paraplegia, sacral/ischial decubitus ulcers, left above-knee amputation. Cold right foot with poor pedal pulses on exam.      TECHNIQUE:  Right lower extremity arterial vascular ultrasound imaging was performed using grey scale, spectral Doppler and color flow Doppler. Assessed segments defined per protocol.    FINDINGS:  Normal triphasic arterial waveforms are documented in right external iliac, CFA, DFA, SFA (proximal, mid, distal), popliteal artery and distal PTA.    No evidence of significant right lower extremity arterial occlusive disease.      Impression:      1.  No duplex Doppler evidence of significant right lower extremity arterial occlusive disease.  2.  This examination does not include ankle-brachial index assessment.      Signer Name: Jaya Quinn MD   Signed: 12/10/2022 9:43 AM   Workstation Name: [facility]    [facility]    CT Abdomen Pelvis Without Contrast [[id number]] Collected: 12/10/22 0031     Updated: 12/10/22 0033    Narrative:      CT Abdomen Pelvis WO    INDICATION:   Urinary tract infection. Buttock wounds. History of paraplegia.    TECHNIQUE:   CT of the abdomen and pelvis without IV contrast. Coronal and sagittal reconstructions were obtained.  Radiation dose reduction techniques included automated exposure control or exposure modulation based on  body size. Count of known CT and cardiac nuc  med studies performed in previous 12 months: 3.     COMPARISON:   3/22/2022    FINDINGS:  Lung bases are clear. Aorta is normal in caliber. Gallbladder is surgically absent. No biliary obstruction is seen. There is hepatic steatosis. There is a nonobstructing left kidney stone measuring 4 mm. No ureteral stones or hydronephrosis identified.  The unenhanced solid abdominal organs are otherwise within normal limits. The prostate gland is small or surgically absent. There does appear to be a cystocele.    Patient has a descending colostomy. There is no evidence of acute colitis. Stool burden is normal. Stomach is normal. No small bowel obstruction is identified. There is an ileal conduit in the right mid abdomen. No adenopathy or free fluid is seen. There  is a chronic right proximal femur fracture. There are large bilateral decubitus ulcers extending to the ischial tuberosities. There is chronic sclerosis in both ischial tuberosities and is unchanged, possibly the result of chronic osteomyelitis. The  coccyx has been eroded or resected. This is unchanged.      Impression:        1. Large bilateral decubitus ulcers extending to the ischial tuberosities, similar to prior.  2. Chronic fracture of the right proximal femur.  3. No bowel obstruction or acute GI tract inflammation. There is a descending colostomy.  4. Ileal conduit is noted. There is no hydronephrosis.  5. Evidence of a cystocele. Prostate gland is either small or surgically absent.  6. Hepatic steatosis and cholecystectomy.          Signer Name: Yohan Padron MD   Signed: 12/10/2022 12:31 AM   [facility]    [facility]        Assessment & Plan    #Severe symptomatic hyperglycemia in the setting of insulin dependent type II diabetes mellitus  - Suspect underlying infection has contributed to his significant hyperglycemia. Glucose has improved to around 300s today. He is  receiving levemir 20 units q12h (increased from home dose of 15 units BID) and today 10 units of novolog was added to be given TID with meals (in place of home novolog 15 units BID). Continue with sliding scale and accuchecks also. Hypoglycemia protocol ordered.       #Severe sepsis/shock, present on admission   - Met criteria on admission with tachycardia, elevated CRP, and lactic acidosis >4. Elevated lactate has now resolved. WBC 11.32 today. Likely secondary to complicated UTI in setting of ileal conduit. Bilateral gluteal ulcers are not felt to be infected by general surgery.  - Urine culture from sample collected 12/9/22 is growing >100,000 CFU/mL mixed sil. He has history of previous UTI with MDRO Serratia marcescens with intermediate susceptibility to zosyn (March 2022), serratia UTI in June 2021, and klebsiella UTI in February 2021. Will request further speciation of urine culture from micro lab to guide antibiotic therapy. No obstructive uropathy or pyelonephritis noted on CT abdomen/pelvis last night.  - Continue vancomycin and zosyn pending further culture results   - Repeat CBC in AM    #JAKE  Baseline creatinine around 0.79-1.0. Creatinine on admission was 1.43. It has improved to 1.23 after gentle IV fluids. Continue holding nephrotoxic medications including entresto, spironolactone, bumex, and metolazone. Blood pressure is appropriate currently but may need to start back entresto if BP is becoming elevated and creatinine is stable.    #Chronic systolic CHF  - Appears compensated. Continue to monitor fluid status with intake/output  - Holding bumex, metolazone, entresto, spironolactone as noted above    #Mild mixed acidosis  - Stable, pH slightly improved on repeat blood gas. pCO2 increased slightly but not significantly so at 45.6. Asymptomatic. Suspect JAKE and obesity hypoventilation are contributing. Lactic acid elevation resolved.    #Morbid obesity  - Affects all aspects of care    #Paraplegia    - Supportive care    #Cold right foot with faintly palpable pedal pulses  - No evidence of right lower extremity arterial occlusive disease    #History of DVT  - Restart home eliquis as Dr. Taylor feels that no surgical intervention is required at this time    Edited by: Darlyn Wade DO at 12/10/2022 1833    VTE Prophylaxis:   Mechanical Order History:     None      Pharmalogical Order History:      Ordered     Dose Route Frequency Stop    12/10/22 1656  apixaban (ELIQUIS) tablet 5 mg         5 mg PO Every 12 Hours Scheduled --    Pending  apixaban (ELIQUIS) tablet 5 mg         5 mg PO Every 12 Hours Scheduled --                Dispo: pending clinical course     Darlyn Wade DO  [facility]  12/10/22  18:35 EST
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
## 2022-12-10 NOTE — PLAN OF CARE
Goal Outcome Evaluation: Patient has been very pleasant. Compliant with care. Patient remains on 2L/NC, saturations remaining on above 90%. Patient wound care completed. Patient with Colostomy and a Urostomy. Patient in no acute distress at this time. Patient currently resting in bed. Will continue with plan of care.

## 2022-12-10 NOTE — PROGRESS NOTES
Pharmacy was consulted to dose zosyn and vancomycin for sepsis, a skin and soft tissue infection, and an UTI. Based on an estimated CrCl of 94mL/min, and an actual body weight of 141kg, an extended infusion zosyn dose of 4.5g q8hr and vancomycin 1g q12hr has been ordered. Vancomycin levels will be obtained and the vancomycin dosing will be adjusted as needed to maintain a therapeutic AUC concentration of 400-600mg/L.hr. Thank you for the consult. Pharmacy will continue to follow.     Thank you,  Suki Frazier, PharmD  23:33 EST
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
## 2022-12-10 NOTE — PAYOR COMM NOTE
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  Canceled        Comments: Follow Standing Orders As Outlined in Process Instructions (Open Order Report to View Full Instructions)    12/09/22 2206 12/09/22 2207  Activity - Bed Rest With Exceptions (Specify)  Until Discontinued         12/09/22 2206 12/09/22 2207  Telemetry - Maintain IV Access  Continuous         12/09/22 2206 12/09/22 2207  Continuous Cardiac Monitoring  Continuous        Comments: Follow Standing Orders As Outlined in Process Instructions (Open Order Report to View Full Instructions)    12/09/22 2206 12/09/22 2207  May Be Off Telemetry for Tests  Continuous         12/09/22 2206 12/09/22 2207  Notify Provider if ACLS Protocol Activated  Until Discontinued         12/09/22 2206 12/09/22 2207  Daily Weights  Daily       12/09/22 2206 12/09/22 2207  Fall Precautions  Continuous         12/09/22 2206 12/09/22 2207  Diet: Cardiac Diets, Diabetic Diets, Renal Diets; Healthy Heart (2-3 Na+); Consistent Carbohydrate; Low Sodium  (2-3g), Low Potassium, Low Phosphorus; Texture: Regular Texture (IDDSI 7); Fluid Consistency: Thin (IDDSI 0)  Diet Effective Now,   Status:  Canceled         12/09/22 2206 12/09/22 2206  sodium chloride 0.9 % flush 10 mL  As Needed         12/09/22 2206 12/09/22 2206  sodium chloride 0.9 % infusion 40 mL  As Needed         12/09/22 2206 12/09/22 2206  nitroglycerin (NITROSTAT) SL tablet 0.4 mg  Every 5 Minutes PRN         12/09/22 2206 12/09/22 2200  POC Glucose 4x Daily AC & at Bedtime  4 Times Daily Before Meals & at Bedtime       12/09/22 2036 12/09/22 2117  CT Abdomen Pelvis Without Contrast  1 Time Imaging         12/09/22 2116 12/09/22 2112  CT Abdomen Pelvis With Contrast  1 Time Imaging,   Status:  Canceled         12/09/22 2111 12/09/22 2107  POC Glucose Once  PROCEDURE ONCE         12/09/22 2057 12/09/22 2100  Insulin Aspart (novoLOG) injection 0-14 Units  4 Times Daily Before Meals & Nightly,   Status:  Discontinued         12/09/22 2036 12/09/22 2100  insulin detemir (LEVEMIR) injection 10 Units  Nightly,   Status:  Discontinued         12/09/22 2036 12/09/22 2037  Do NOT Hold Basal or Correction Scale Insulin When Patient is NPO, Hold Scheduled Mealtime (Bolus) Insulin if NPO  Continuous         12/09/22 2036 12/09/22 2036  dextrose (GLUTOSE) oral gel 15 g  Every 15 Minutes PRN         12/09/22 2036 12/09/22 2036  dextrose (D50W) (25 g/50 mL) IV injection 25 g  Every 15 Minutes PRN         12/09/22 2036 12/09/22 2036  glucagon (human recombinant) (GLUCAGEN DIAGNOSTIC) injection 1 mg  Every 15 Minutes PRN         12/09/22 2036 12/09/22 2035  Hemoglobin A1c  STAT         12/09/22 2034 12/09/22 2034  Procalcitonin  STAT         12/09/22 2033 12/09/22 2033  Inpatient Admission  Once         12/09/22 2035 12/09/22 2033  Code Status and Medical Interventions:  Continuous         12/09/22 2035 12/09/22 2005  cefTRIAXone (ROCEPHIN) 1 g in sodium chloride 0.9  % 100 mL IVPB-VTB  Once         12/09/22 2003 12/09/22 2004  Lactic Acid, Plasma  Once         12/09/22 2003 12/09/22 2004  Blood Culture - Blood, Arm, Left  Once        See Hyperspace for full Linked Orders Report.    12/09/22 2003 12/09/22 2004  Blood Culture - Blood, Wrist, Left  Once        See Hyperspace for full Linked Orders Report.    12/09/22 2003 12/09/22 2001  Urine Culture - Urine, Urine, Clean Catch  Once         12/09/22 2000 12/09/22 1947  Urinalysis, Microscopic Only - Urine, Clean Catch  Once         12/09/22 1946    12/09/22 1901  Blood Gas, Arterial With Co-Ox  PROCEDURE ONCE         12/09/22 1858    12/09/22 1849  insulin regular (humuLIN R,novoLIN R) injection 10 Units  Once,   Status:  Discontinued         12/09/22 1848    12/09/22 1848  Blood Gas, Arterial -With Co-Ox Panel: Yes  Once         12/09/22 1847    12/09/22 1848  COVID-19 and FLU A/B PCR - Swab, Nasopharynx  Once         12/09/22 1847    12/09/22 1841  sodium chloride 0.9 % bolus 500 mL  Once,   Status:  Discontinued         12/09/22 1839    12/09/22 1840  BNP  STAT         12/09/22 1839    12/09/22 1839  POC Glucose Once  PROCEDURE ONCE         12/09/22 1832    12/09/22 1813  POC Glucose Once  Once         12/09/22 1812    12/09/22 1722  Blood Gas, Venous With Co-Ox  PROCEDURE ONCE         12/09/22 1716    12/09/22 1653  sodium chloride 0.9 % bolus 1,000 mL  Once         12/09/22 1651    12/09/22 1653  insulin regular (humuLIN R,novoLIN R) injection 10 Units  Once         12/09/22 1651    12/09/22 1652  Blood Gas, Arterial -With Co-Ox Panel: Yes  Once         12/09/22 1651    12/09/22 1510  XR Chest 1 View  1 Time Imaging         12/09/22 1509    12/09/22 1509  Insert Peripheral IV  Once        See Hyperspace for full Linked Orders Report.    12/09/22 1509    12/09/22 1508  sodium chloride 0.9 % flush 10 mL  As Needed        See Hyperspace for full Linked Orders Report.    12/09/22 1509    12/09/22 1447  POC Glucose  STAT  STAT         12/09/22 1446    12/09/22 1446  C-reactive Protein  Once         12/09/22 1446    12/09/22 1446  Sedimentation Rate  Once         12/09/22 1446    12/09/22 1445  CBC & Differential  Once         12/09/22 1446    12/09/22 1445  Comprehensive Metabolic Panel  Once         12/09/22 1446    12/09/22 1445  [place] Draw  Once         12/09/22 1446    12/09/22 1445  Urinalysis With Culture If Indicated - Urine, Clean Catch  Once         12/09/22 1446    12/09/22 1445  Acetone  Once         12/09/22 1446    12/09/22 1445  CBC Auto Differential  PROCEDURE ONCE         12/09/22 1446    12/09/22 1445  Green Top (Gel)  PROCEDURE ONCE         12/09/22 1446    12/09/22 1445  Lavender Top  PROCEDURE ONCE         12/09/22 1446    12/09/22 1445  Gold Top - SST  PROCEDURE ONCE         12/09/22 1446    12/09/22 1445  Light Blue Top  PROCEDURE ONCE         12/09/22 1446    12/09/22 1414  POC Glucose Finger Once  Once         12/09/22 1413    Unscheduled  Up With Assistance  As Needed       12/09/22 2206    Unscheduled  Telemetry - Pulse Oximetry  Continuous PRN      Comments: If Patient Develops Unresponsiveness, Acute Dyspnea, Cyanosis or Suspected Hypoxemia Start Continuous Pulse Ox Monitoring, Apply Oxygen & Notify Provider    12/09/22 2206    Unscheduled  Oxygen Therapy- Nasal Cannula; Titrate for SPO2: 90% - 95%  Continuous PRN      Comments: If Patient Develops Unresponsiveness, Acute Dyspnea, Cyanosis or Suspected Hypoxemia Start Continuous Pulse Ox Monitoring, Apply Oxygen & Notify Provider    12/09/22 2206    Unscheduled  ECG 12 Lead Other; Acute Chest Pain or Dysrhythmia  As Needed      Comments: Nurse to Release if Patient Expericences Acute Chest Pain or Dysrhythmias    12/09/22 2206    Unscheduled  Potassium  As Needed      Comments: For Ventricular Arrhythmias      12/09/22 2206    Unscheduled  Magnesium  As Needed      Comments: For Ventricular Arrhythmias      12/09/22 2206    Unscheduled  Troponin   As Needed      Comments: For Chest Pain      12/09/22 2206    Unscheduled  Blood Gas, Arterial -With Co-Ox Panel: Yes  As Needed      Comments: Draw for Acute Dyspnea, Cyanosis, Suspected Hypoxemia or UnresponsivenessNotify Provider of Results      12/09/22 2206    Unscheduled  Initiate ACLS Protocol For Life Threatening Dysrhythmias (If Appropriate for Patient)  As Needed       12/09/22 2206    Unscheduled  Follow Hypoglycemia Standing Orders For Blood Glucose <70 & Notify Provider of Treatment  As Needed      Comments: Follow Hypoglycemia Orders As Outlined in Process Instructions (Open Order Report to View Full Instructions)  Notify Provider Any Time Hypoglycemia Treatment is Administered    12/09/22 2036    Unscheduled  Follow Hypoglycemia Standing Orders For Blood Glucose <70 & Notify Provider of Treatment  As Needed      Comments: Follow Hypoglycemia Orders As Outlined in Process Instructions (Open Order Report to View Full Instructions)  Notify Provider Any Time Hypoglycemia Treatment is Administered    12/10/22 0411                Physician Progress Notes (last 24 hours)  Notes from 12/09/22 0713 through 12/10/22 0713   No notes of this type exist for this encounter.         Consult Notes (last 24 hours)  Notes from 12/09/22 0713 through 12/10/22 0713   No notes of this type exist for this encounter.

## 2022-12-11 LAB
ALBUMIN SERPL-MCNC: 2.97 G/DL (ref 3.5–5.2)
ALBUMIN/GLOB SERPL: 0.7 G/DL
ALP SERPL-CCNC: 113 U/L (ref 39–117)
ALT SERPL W P-5'-P-CCNC: 34 U/L (ref 1–41)
ANION GAP SERPL CALCULATED.3IONS-SCNC: 9.8 MMOL/L (ref 5–15)
AST SERPL-CCNC: 26 U/L (ref 1–40)
BILIRUB SERPL-MCNC: 0.2 MG/DL (ref 0–1.2)
BUN SERPL-MCNC: 24 MG/DL (ref 6–20)
BUN/CREAT SERPL: 24.5 (ref 7–25)
CALCIUM SPEC-SCNC: 8.9 MG/DL (ref 8.6–10.5)
CHLORIDE SERPL-SCNC: 100 MMOL/L (ref 98–107)
CO2 SERPL-SCNC: 22.2 MMOL/L (ref 22–29)
CREAT SERPL-MCNC: 0.98 MG/DL (ref 0.76–1.27)
DEPRECATED RDW RBC AUTO: 50 FL (ref 37–54)
EGFRCR SERPLBLD CKD-EPI 2021: 96.9 ML/MIN/1.73
ERYTHROCYTE [DISTWIDTH] IN BLOOD BY AUTOMATED COUNT: 15.3 % (ref 12.3–15.4)
GLOBULIN UR ELPH-MCNC: 4.4 GM/DL
GLUCOSE BLDC GLUCOMTR-MCNC: 232 MG/DL (ref 70–130)
GLUCOSE BLDC GLUCOMTR-MCNC: 322 MG/DL (ref 70–130)
GLUCOSE BLDC GLUCOMTR-MCNC: 337 MG/DL (ref 70–130)
GLUCOSE BLDC GLUCOMTR-MCNC: 356 MG/DL (ref 70–130)
GLUCOSE BLDC GLUCOMTR-MCNC: 381 MG/DL (ref 70–130)
GLUCOSE SERPL-MCNC: 385 MG/DL (ref 65–99)
HCT VFR BLD AUTO: 38.4 % (ref 37.5–51)
HGB BLD-MCNC: 11.7 G/DL (ref 13–17.7)
MCH RBC QN AUTO: 26.9 PG (ref 26.6–33)
MCHC RBC AUTO-ENTMCNC: 30.5 G/DL (ref 31.5–35.7)
MCV RBC AUTO: 88.3 FL (ref 79–97)
PLATELET # BLD AUTO: 244 10*3/MM3 (ref 140–450)
PMV BLD AUTO: 10.2 FL (ref 6–12)
POTASSIUM SERPL-SCNC: 3.5 MMOL/L (ref 3.5–5.2)
PROT SERPL-MCNC: 7.4 G/DL (ref 6–8.5)
RBC # BLD AUTO: 4.35 10*6/MM3 (ref 4.14–5.8)
SODIUM SERPL-SCNC: 132 MMOL/L (ref 136–145)
VANCOMYCIN TROUGH SERPL-MCNC: 13.8 MCG/ML (ref 5–20)
WBC NRBC COR # BLD: 6.75 10*3/MM3 (ref 3.4–10.8)

## 2022-12-11 PROCEDURE — 25010000002 VANCOMYCIN 1 G RECONSTITUTED SOLUTION 1 EACH VIAL: Performed by: HOSPITALIST

## 2022-12-11 PROCEDURE — 80053 COMPREHEN METABOLIC PANEL: CPT | Performed by: STUDENT IN AN ORGANIZED HEALTH CARE EDUCATION/TRAINING PROGRAM

## 2022-12-11 PROCEDURE — 80202 ASSAY OF VANCOMYCIN: CPT | Performed by: STUDENT IN AN ORGANIZED HEALTH CARE EDUCATION/TRAINING PROGRAM

## 2022-12-11 PROCEDURE — 63710000001 INSULIN DETEMIR PER 5 UNITS: Performed by: HOSPITALIST

## 2022-12-11 PROCEDURE — 25010000002 PIPERACILLIN SOD-TAZOBACTAM PER 1 G: Performed by: HOSPITALIST

## 2022-12-11 PROCEDURE — 85027 COMPLETE CBC AUTOMATED: CPT | Performed by: STUDENT IN AN ORGANIZED HEALTH CARE EDUCATION/TRAINING PROGRAM

## 2022-12-11 PROCEDURE — 82962 GLUCOSE BLOOD TEST: CPT

## 2022-12-11 PROCEDURE — 63710000001 INSULIN ASPART PER 5 UNITS: Performed by: HOSPITALIST

## 2022-12-11 PROCEDURE — 63710000001 INSULIN ASPART PER 5 UNITS: Performed by: STUDENT IN AN ORGANIZED HEALTH CARE EDUCATION/TRAINING PROGRAM

## 2022-12-11 PROCEDURE — 99231 SBSQ HOSP IP/OBS SF/LOW 25: CPT | Performed by: STUDENT IN AN ORGANIZED HEALTH CARE EDUCATION/TRAINING PROGRAM

## 2022-12-11 PROCEDURE — 63710000001 INSULIN DETEMIR PER 5 UNITS: Performed by: STUDENT IN AN ORGANIZED HEALTH CARE EDUCATION/TRAINING PROGRAM

## 2022-12-11 RX ADMIN — Medication 1 CAPSULE: at 08:14

## 2022-12-11 RX ADMIN — FERROUS SULFATE TAB 325 MG (65 MG ELEMENTAL FE) 325 MG: 325 (65 FE) TAB at 08:13

## 2022-12-11 RX ADMIN — GABAPENTIN 800 MG: 400 CAPSULE ORAL at 05:58

## 2022-12-11 RX ADMIN — PIPERACILLIN SODIUM AND TAZOBACTAM SODIUM 4.5 G: 4; .5 INJECTION, POWDER, LYOPHILIZED, FOR SOLUTION INTRAVENOUS at 05:58

## 2022-12-11 RX ADMIN — ATORVASTATIN CALCIUM 10 MG: 10 TABLET, FILM COATED ORAL at 21:06

## 2022-12-11 RX ADMIN — INSULIN DETEMIR 20 UNITS: 100 INJECTION, SOLUTION SUBCUTANEOUS at 08:11

## 2022-12-11 RX ADMIN — PIPERACILLIN SODIUM AND TAZOBACTAM SODIUM 4.5 G: 4; .5 INJECTION, POWDER, LYOPHILIZED, FOR SOLUTION INTRAVENOUS at 15:02

## 2022-12-11 RX ADMIN — COLLAGENASE SANTYL 1 APPLICATION: 250 OINTMENT TOPICAL at 08:17

## 2022-12-11 RX ADMIN — Medication 10 ML: at 08:18

## 2022-12-11 RX ADMIN — BACLOFEN 30 MG: 10 TABLET ORAL at 17:12

## 2022-12-11 RX ADMIN — DICYCLOMINE HYDROCHLORIDE 10 MG: 10 CAPSULE ORAL at 08:13

## 2022-12-11 RX ADMIN — DICYCLOMINE HYDROCHLORIDE 10 MG: 10 CAPSULE ORAL at 21:06

## 2022-12-11 RX ADMIN — COLLAGENASE SANTYL 1 APPLICATION: 250 OINTMENT TOPICAL at 21:07

## 2022-12-11 RX ADMIN — INSULIN ASPART 20 UNITS: 100 INJECTION, SOLUTION INTRAVENOUS; SUBCUTANEOUS at 21:06

## 2022-12-11 RX ADMIN — Medication 10 ML: at 21:07

## 2022-12-11 RX ADMIN — INSULIN ASPART 20 UNITS: 100 INJECTION, SOLUTION INTRAVENOUS; SUBCUTANEOUS at 12:32

## 2022-12-11 RX ADMIN — CHOLECALCIFEROL TAB 10 MCG (400 UNIT) 1000 UNITS: 10 TAB at 08:13

## 2022-12-11 RX ADMIN — INSULIN ASPART 10 UNITS: 100 INJECTION, SOLUTION INTRAVENOUS; SUBCUTANEOUS at 08:11

## 2022-12-11 RX ADMIN — GABAPENTIN 800 MG: 400 CAPSULE ORAL at 21:06

## 2022-12-11 RX ADMIN — OXYCODONE HYDROCHLORIDE AND ACETAMINOPHEN 500 MG: 500 TABLET ORAL at 08:14

## 2022-12-11 RX ADMIN — VANCOMYCIN HYDROCHLORIDE 1000 MG: 1 INJECTION, POWDER, LYOPHILIZED, FOR SOLUTION INTRAVENOUS at 13:52

## 2022-12-11 RX ADMIN — PIPERACILLIN SODIUM AND TAZOBACTAM SODIUM 4.5 G: 4; .5 INJECTION, POWDER, LYOPHILIZED, FOR SOLUTION INTRAVENOUS at 21:05

## 2022-12-11 RX ADMIN — MAGNESIUM GLUCONATE 500 MG ORAL TABLET 1200 MG: 500 TABLET ORAL at 08:13

## 2022-12-11 RX ADMIN — INSULIN ASPART 10 UNITS: 100 INJECTION, SOLUTION INTRAVENOUS; SUBCUTANEOUS at 17:12

## 2022-12-11 RX ADMIN — ARIPIPRAZOLE 10 MG: 10 TABLET ORAL at 21:06

## 2022-12-11 RX ADMIN — ASPIRIN 81 MG: 81 TABLET, COATED ORAL at 08:13

## 2022-12-11 RX ADMIN — BACLOFEN 30 MG: 10 TABLET ORAL at 21:06

## 2022-12-11 RX ADMIN — INSULIN ASPART 10 UNITS: 100 INJECTION, SOLUTION INTRAVENOUS; SUBCUTANEOUS at 12:32

## 2022-12-11 RX ADMIN — CARVEDILOL 12.5 MG: 6.25 TABLET, FILM COATED ORAL at 08:13

## 2022-12-11 RX ADMIN — INSULIN ASPART 16 UNITS: 100 INJECTION, SOLUTION INTRAVENOUS; SUBCUTANEOUS at 17:12

## 2022-12-11 RX ADMIN — VANCOMYCIN HYDROCHLORIDE 1000 MG: 1 INJECTION, POWDER, LYOPHILIZED, FOR SOLUTION INTRAVENOUS at 01:20

## 2022-12-11 RX ADMIN — SUCRALFATE 1 G: 1 TABLET ORAL at 08:14

## 2022-12-11 RX ADMIN — INSULIN DETEMIR 25 UNITS: 100 INJECTION, SOLUTION SUBCUTANEOUS at 21:06

## 2022-12-11 RX ADMIN — CARVEDILOL 12.5 MG: 6.25 TABLET, FILM COATED ORAL at 17:12

## 2022-12-11 RX ADMIN — DULOXETINE HYDROCHLORIDE 60 MG: 60 CAPSULE, DELAYED RELEASE ORAL at 08:13

## 2022-12-11 RX ADMIN — Medication 1 TABLET: at 08:13

## 2022-12-11 RX ADMIN — APIXABAN 5 MG: 5 TABLET, FILM COATED ORAL at 08:13

## 2022-12-11 RX ADMIN — DULOXETINE HYDROCHLORIDE 60 MG: 60 CAPSULE, DELAYED RELEASE ORAL at 21:06

## 2022-12-11 RX ADMIN — INSULIN ASPART 16 UNITS: 100 INJECTION, SOLUTION INTRAVENOUS; SUBCUTANEOUS at 08:10

## 2022-12-11 RX ADMIN — BACLOFEN 30 MG: 10 TABLET ORAL at 08:13

## 2022-12-11 RX ADMIN — APIXABAN 5 MG: 5 TABLET, FILM COATED ORAL at 21:06

## 2022-12-11 RX ADMIN — MODAFINIL 200 MG: 100 TABLET ORAL at 08:14

## 2022-12-11 RX ADMIN — BACLOFEN 30 MG: 10 TABLET ORAL at 12:32

## 2022-12-11 RX ADMIN — GABAPENTIN 800 MG: 400 CAPSULE ORAL at 13:59

## 2022-12-11 NOTE — PROGRESS NOTES
[facility] HOSPITALIST PROGRESS NOTE     Patient Identification:  Name:  Ken Krueger  Age:  47 y.o.  Sex:  male  :  1975  MRN:  [id number]  Visit Number:  [id number]  ROOM: [address] [place] [address]     Primary Care Provider:  Franchesca Hodges APRN    Length of stay in inpatient status:  2    Subjective     Chief Compliant:    Chief Complaint   Patient presents with   • Hyperglycemia       History of Presenting Illness:    Patient was sleeping when I entered the room this morning, but woke and reported he was feeling okay. Denied shortness of breath, chills, or any other new symptoms.    Objective     Current Hospital Meds:Acidophilus/Pectin, 1 capsule, Oral, Daily  apixaban, 5 mg, Oral, Q12H  ARIPiprazole, 10 mg, Oral, Nightly  ascorbic acid, 500 mg, Oral, Daily  aspirin, 81 mg, Oral, Daily  atorvastatin, 10 mg, Oral, Nightly  baclofen, 30 mg, Oral, 4x Daily With Meals & Nightly  carvedilol, 12.5 mg, Oral, BID With Meals  cholecalciferol, 1,000 Units, Oral, Daily  collagenase, 1 application, Topical, Q12H  dicyclomine, 10 mg, Oral, BID  DULoxetine, 60 mg, Oral, BID  ferrous sulfate, 325 mg, Oral, Daily With Breakfast  gabapentin, 800 mg, Oral, Q8H  Insulin Aspart, 0-24 Units, Subcutaneous, 4x Daily AC & at Bedtime  insulin aspart, 10 Units, Subcutaneous, TID With Meals  insulin detemir, 25 Units, Subcutaneous, Q12H  magnesium oxide, 1,200 mg, Oral, Daily  modafinil, 200 mg, Oral, Daily  multivitamin with minerals, 1 tablet, Oral, Daily  pantoprazole, 40 mg, Oral, QAM  piperacillin-tazobactam, 4.5 g, Intravenous, Q8H  sodium chloride, 10 mL, Intravenous, Q12H  sucralfate, 1 g, Oral, Daily  vancomycin, 1,000 mg, Intravenous, Q12H    sodium chloride, 50 mL/hr, Last Rate: 50 mL/hr (22 8200)        Current Antimicrobial Therapy:  Anti-Infectives (From admission, onward)    Ordered     Dose/Rate Route Frequency Start Stop    22 7811  vancomycin (VANCOCIN) 1,000 mg in sodium chloride 0.9 % 250 mL IVPB         Ordering Provider: Carlin Landers MD    1,000 mg  over 60 Minutes Intravenous Every 12 Hours 12/10/22 1300 12/15/22 1259    12/09/22 2317  piperacillin-tazobactam (ZOSYN) IVPB 4.5 g in 100 mL NS VTB        Ordering Provider: Carlin Landers MD    4.5 g  over 4 Hours Intravenous Every 8 Hours 12/10/22 0600 12/15/22 0559    12/09/22 2319  vancomycin 2000 mg/500 mL 0.9% NS IVPB ([facility])        Ordering Provider: Carlin Landers MD    2,000 mg  over 120 Minutes Intravenous Once 12/10/22 0100 12/10/22 0431    12/09/22 2317  piperacillin-tazobactam (ZOSYN) IVPB 4.5 g in 100 mL NS VTB        Ordering Provider: Carlin Landers MD    4.5 g  over 30 Minutes Intravenous Once 12/10/22 0030 12/10/22 0202    12/09/22 2003  cefTRIAXone (ROCEPHIN) 1 g in sodium chloride 0.9 % 100 mL IVPB-VTB        Ordering Provider: Adeola Parada PA-C    1 g  200 mL/hr over 30 Minutes Intravenous Once 12/09/22 2005 12/09/22 2125        Current Diuretic Therapy:  Diuretics (From admission, onward)    None        ----------------------------------------------------------------------------------------------------------------------  Vital Signs:  Temp:  [97.2 °F (36.2 °C)-98.5 °F (36.9 °C)] 97.2 °F (36.2 °C)  Heart Rate:  [82-98] 82  Resp:  [16-20] 20  BP: (106-136)/(63-83) 114/68  SpO2:  [94 %-98 %] 94 %  on  Flow (L/min):  [2] 2;   Device (Oxygen Therapy): nasal cannula  Body mass index is 42.5 kg/m².    Wt Readings from Last 3 Encounters:   12/11/22 (!) 138 kg (304 lb 11.2 oz)   12/02/22 (!) 150 kg (330 lb)   11/17/22 (!) 150 kg (330 lb)     Intake & Output (last 3 days)       12/08 0701 12/09 0700 12/09 0701  12/10 0700 12/10 0701 12/11 0700 12/11 0701 12/12 0700    P.O.   1060 330    IV Piggyback  1100      Total Intake(mL/kg)  1100 (7.8) 1060 (7.7) 330 (2.4)    Urine (mL/kg/hr)  1000 4350 (1.3) 1600 (1.4)    Stool   103     Total Output  1000 4453 1600    Net  +100 -1614 -6026                Diet:  Regular/House Diet, Diabetic Diets; Consistent Carbohydrate; Texture: Regular Texture (IDDSI 7); Fluid Consistency: Thin (IDDSI 0)  ----------------------------------------------------------------------------------------------------------------------  Physical exam:   Constitutional:  Well-developed and well-nourished.  No acute distress.      HENT:  Head:  Normocephalic and atraumatic.    Cardiovascular:  Normal rate, regular rhythm and normal heart sounds with no murmur.  Pulmonary/Chest:  No respiratory distress, no wheezes, no crackles, with normal breath sounds and good air movement.  Abdominal:  Soft. No distension. Clear yellow urine in urostomy bag.  Musculoskeletal:   No red or swollen joints anywhere.    Neurological:  Asleep initially, woke and answered questions appropriately, paraplegic at baseline  Skin:  Skin is warm and dry. No rash noted.   Peripheral vascular:  No cyanosis, 1+ edema right lower extremity  Psychiatric: Appropriate mood and affect  Edited by: Darlyn Wade DO at 12/11/2022 1533  ----------------------------------------------------------------------------------------------------------------------  Results from last 7 days   Lab Units 12/11/22  0359 12/10/22  0357 12/10/22  0053 12/09/22 2200 12/09/22 2038 12/09/22  1554   CRP mg/dL  --   --  7.93*  --   --  9.68*   LACTATE mmol/L  --  2.0 2.6* 4.4*   < >  --    WBC 10*3/mm3 6.75  --  11.32*  --   --  9.87   HEMOGLOBIN g/dL 11.7*  --  11.8*  --   --  12.4*   HEMATOCRIT % 38.4  --  38.2  --   --  40.1   MCV fL 88.3  --  87.2  --   --  86.8   MCHC g/dL 30.5*  --  30.9*  --   --  30.9*   PLATELETS 10*3/mm3 244  --  283  --   --  289    < > = values in this interval not displayed.     Results from last 7 days   Lab Units 12/10/22  0409   PH, ARTERIAL pH units 7.327*   PO2 ART mm Hg 59.0*   PCO2, ARTERIAL mm Hg 45.6*   HCO3 ART mmol/L 23.9     Results from last 7 days   Lab Units 12/11/22  0359 12/10/22  0053 12/09/22  1554   SODIUM  mmol/L 132* 132* 131*   POTASSIUM mmol/L 3.5 3.9 4.7   CHLORIDE mmol/L 100 100 96*   CO2 mmol/L 22.2 19.1* 21.9*   BUN mg/dL 24* 32* 34*   CREATININE mg/dL 0.98 1.23 1.43*   CALCIUM mg/dL 8.9 8.8 9.5   GLUCOSE mg/dL 385* 419* 622*   ALBUMIN g/dL 2.97* 2.92* 3.55   BILIRUBIN mg/dL 0.2 0.2 0.2   ALK PHOS U/L 113 123* 133*   AST (SGOT) U/L 26 23 18   ALT (SGPT) U/L 34 34 34   Estimated Creatinine Clearance: 134.6 mL/min (by C-G formula based on SCr of 0.98 mg/dL).  No results found for: AMMONIA      Results from last 7 days   Lab Units 12/09/22  1554   PROBNP pg/mL 1,056.0*         Hemoglobin A1C   Date/Time Value Ref Range Status   12/09/2022 1554 11.80 (H) 4.80 - 5.60 % Final     Glucose   Date/Time Value Ref Range Status   12/11/2022 0929 356 (H) 70 - 130 mg/dL Final     Comment:     Meter: [id number] : [id number] Familia Lee   12/11/2022 0648 337 (H) 70 - 130 mg/dL Final     Comment:     Meter: [id number] : [id number] Familia Lee   12/11/2022 0012 232 (H) 70 - 130 mg/dL Final     Comment:     RN Notified Meter: [id number] : [id number] BERTHA WOODS   12/10/2022 1946 351 (H) 70 - 130 mg/dL Final     Comment:     RN Notified Meter: [id number] : [id number] BERTHA LIU   12/10/2022 1634 310 (H) 70 - 130 mg/dL Final     Comment:     Meter: [id number] : [id number] Familia Lee   12/10/2022 1118 385 (H) 70 - 130 mg/dL Final     Comment:     Meter: [id number] : [id number] Familia Lee   12/10/2022 0858 326 (H) 70 - 130 mg/dL Final     Comment:     Meter: [id number] : [id number] Familia Lee   12/10/2022 0657 350 (H) 70 - 130 mg/dL Final     Comment:     Meter: [id number] : [id number] Familia Lee     Lab Results   Component Value Date    TSH 3.780 07/19/2022    FREET4 1.31 06/02/2021     No results found for: PREGTESTUR, PREGSERUM, HCG, HCGQUANT  Pain Management Panel     Pain Management Panel Latest Ref Rng & Units 6/2/2021 8/19/2018    AMPHETAMINES SCREEN, URINE Negative Negative  Negative    BARBITURATES SCREEN Negative Negative Negative    BENZODIAZEPINE SCREEN, URINE Negative Negative Negative    BUPRENORPHINEUR Negative Negative Negative    COCAINE SCREEN, URINE Negative Negative Negative    METHADONE SCREEN, URINE Negative Negative Negative        Brief Urine Lab Results  (Last result in the past 365 days)      Color   Clarity   Blood   Leuk Est   Nitrite   Protein   CREAT   Urine HCG        12/09/22 1928 Yellow   Clear   Trace   Small (1+)   Negative   Negative               Blood Culture   Date Value Ref Range Status   12/09/2022 No growth at 24 hours  Preliminary   12/09/2022 No growth at 24 hours  Preliminary     Urine Culture   Date Value Ref Range Status   12/09/2022 >100,000 CFU/mL Mixed Mariza Isolated  Final     No results found for: WOUNDCX  No results found for: STOOLCX  No results found for: RESPCX  No results found for: AFBCX  Results from last 7 days   Lab Units 12/10/22  0357 12/10/22  0053 12/09/22 2200 12/09/22 2038 12/09/22  1554   PROCALCITONIN ng/mL  --   --   --   --  0.13   LACTATE mmol/L 2.0 2.6* 4.4* 2.5*  --    SED RATE mm/hr  --   --   --   --  101*   CRP mg/dL  --  7.93*  --   --  9.68*       I have personally looked at the labs and they are summarized above.  ----------------------------------------------------------------------------------------------------------------------  Detailed radiology reports for the last 24 hours:  Imaging Results (Last 24 Hours)     ** No results found for the last 24 hours. **        Assessment & Plan    #Severe symptomatic hyperglycemia in the setting of insulin dependent type II diabetes mellitus  - Suspect underlying infection has contributed to his significant hyperglycemia. Glucose is still staying around 300s. Increase levemir to 25 units q12h. He received 143 units of insulin total yesterday. Continue 10 units of novolog TID with meals (in place of home novolog 15 units BID). Continue with sliding scale and accuchecks  also. Hypoglycemia protocol ordered.       #Severe sepsis/shock, present on admission   - Met criteria on admission with tachycardia, elevated CRP, and lactic acidosis >4. Elevated lactate has now resolved. Mild leukocytosis present yesterday but has now resolved. Likely secondary to complicated UTI in setting of ileal conduit. Bilateral gluteal ulcers are not felt to be infected by general surgery.  - Urine culture from sample collected 12/9/22 is growing >100,000 CFU/mL mixed sil. He has history of previous UTI with MDRO Serratia marcescens with intermediate susceptibility to zosyn (March 2022), serratia UTI in June 2021, and klebsiella UTI in February 2021. Further speciation of urine culture was requested to guide antibiotic therapy, but micro lab says that they are unable to do so with current plates. I have placed another order with request to work up the predominant organism if possible, but as he has been receiving antibiotics for several days I think repeat culture will likely be low yield. No obstructive uropathy or pyelonephritis noted on CT abdomen/pelvis.  - Continue vancomycin and zosyn pending further culture results   - Repeat CBC in AM    #JAKE  Baseline creatinine around 0.79-1.0. Creatinine on admission was 1.43. It has improved to 0.98 after gentle IV fluids. Restart home medications of bumex and metolazone. Blood pressure is appropriate currently and I don't think would tolerate restarting his entresto and spironolactone at this time.    #Chronic systolic CHF  - Appears compensated. Continue to monitor fluid status with intake/output  - Restart bumex, metolazone  - Blood pressure is appropriate currently and I don't think would tolerate restarting his entresto and spironolactone at this time.    #Mild mixed acidosis  - Stable, pH slightly improved on repeat blood gas. pCO2 increased slightly but not significantly so at 45.6. Asymptomatic. Suspect JAKE and obesity hypoventilation are  contributing. Lactic acid elevation resolved.    #Morbid obesity  - Affects all aspects of care    #Paraplegia   - Supportive care    #Cold right foot with faintly palpable pedal pulses  - No evidence of right lower extremity arterial occlusive disease on arterial ultrasound.    #History of DVT  - Continue home eliquis as Dr. Taylor feels that no surgical intervention is required at this time.    Edited by: Darlyn Wade DO at 12/11/2022 1532    VTE Prophylaxis:   Mechanical Order History:     None      Pharmalogical Order History:      Ordered     Dose Route Frequency Stop    12/10/22 1656  apixaban (ELIQUIS) tablet 5 mg         5 mg PO Every 12 Hours Scheduled --    Pending  apixaban (ELIQUIS) tablet 5 mg         5 mg PO Every 12 Hours Scheduled --                Dispo:  likely home at discharge pending clinical improvement     Darlyn Wade DO  [facility] Hospitalist  12/11/22  15:33 EST

## 2022-12-11 NOTE — PLAN OF CARE
Goal Outcome Evaluation: Patient has been very pleasant. Compliant with care. Patient remains on 2L/NC, saturations remaining in the 90%. Patient in no acute distress at this time. Patient currently resting in bed. Will continue with plan of care.

## 2022-12-11 NOTE — PROGRESS NOTES
Kinetics :   Vancomycin  Day 3    The pre-dose vancomycin level was within the desired goal range for this therapy and plan to continue the same for now and monitor with you.

## 2022-12-12 LAB
ALBUMIN SERPL-MCNC: 2.94 G/DL (ref 3.5–5.2)
ALBUMIN/GLOB SERPL: 0.7 G/DL
ALP SERPL-CCNC: 110 U/L (ref 39–117)
ALT SERPL W P-5'-P-CCNC: 45 U/L (ref 1–41)
ANION GAP SERPL CALCULATED.3IONS-SCNC: 9.9 MMOL/L (ref 5–15)
AST SERPL-CCNC: 29 U/L (ref 1–40)
BILIRUB SERPL-MCNC: 0.2 MG/DL (ref 0–1.2)
BUN SERPL-MCNC: 22 MG/DL (ref 6–20)
BUN/CREAT SERPL: 24.7 (ref 7–25)
CALCIUM SPEC-SCNC: 9.1 MG/DL (ref 8.6–10.5)
CHLORIDE SERPL-SCNC: 102 MMOL/L (ref 98–107)
CO2 SERPL-SCNC: 22.1 MMOL/L (ref 22–29)
CREAT SERPL-MCNC: 0.89 MG/DL (ref 0.76–1.27)
DEPRECATED RDW RBC AUTO: 49.2 FL (ref 37–54)
EGFRCR SERPLBLD CKD-EPI 2021: 107.7 ML/MIN/1.73
ERYTHROCYTE [DISTWIDTH] IN BLOOD BY AUTOMATED COUNT: 15.2 % (ref 12.3–15.4)
GLOBULIN UR ELPH-MCNC: 4.3 GM/DL
GLUCOSE BLDC GLUCOMTR-MCNC: 330 MG/DL (ref 70–130)
GLUCOSE BLDC GLUCOMTR-MCNC: 336 MG/DL (ref 70–130)
GLUCOSE BLDC GLUCOMTR-MCNC: 352 MG/DL (ref 70–130)
GLUCOSE BLDC GLUCOMTR-MCNC: 382 MG/DL (ref 70–130)
GLUCOSE BLDC GLUCOMTR-MCNC: 400 MG/DL (ref 70–130)
GLUCOSE BLDC GLUCOMTR-MCNC: 424 MG/DL (ref 70–130)
GLUCOSE BLDC GLUCOMTR-MCNC: 425 MG/DL (ref 70–130)
GLUCOSE BLDC GLUCOMTR-MCNC: 434 MG/DL (ref 70–130)
GLUCOSE BLDC GLUCOMTR-MCNC: >599 MG/DL (ref 70–130)
GLUCOSE SERPL-MCNC: 358 MG/DL (ref 65–99)
HCT VFR BLD AUTO: 38.6 % (ref 37.5–51)
HGB BLD-MCNC: 11.4 G/DL (ref 13–17.7)
MCH RBC QN AUTO: 26.1 PG (ref 26.6–33)
MCHC RBC AUTO-ENTMCNC: 29.5 G/DL (ref 31.5–35.7)
MCV RBC AUTO: 88.3 FL (ref 79–97)
PLATELET # BLD AUTO: 256 10*3/MM3 (ref 140–450)
PMV BLD AUTO: 10 FL (ref 6–12)
POTASSIUM SERPL-SCNC: 3.9 MMOL/L (ref 3.5–5.2)
PROT SERPL-MCNC: 7.2 G/DL (ref 6–8.5)
RBC # BLD AUTO: 4.37 10*6/MM3 (ref 4.14–5.8)
SODIUM SERPL-SCNC: 134 MMOL/L (ref 136–145)
WBC NRBC COR # BLD: 8.64 10*3/MM3 (ref 3.4–10.8)

## 2022-12-12 PROCEDURE — 63710000001 INSULIN DETEMIR PER 5 UNITS: Performed by: STUDENT IN AN ORGANIZED HEALTH CARE EDUCATION/TRAINING PROGRAM

## 2022-12-12 PROCEDURE — 63710000001 INSULIN ASPART PER 5 UNITS: Performed by: STUDENT IN AN ORGANIZED HEALTH CARE EDUCATION/TRAINING PROGRAM

## 2022-12-12 PROCEDURE — 82962 GLUCOSE BLOOD TEST: CPT

## 2022-12-12 PROCEDURE — 25010000002 PIPERACILLIN SOD-TAZOBACTAM PER 1 G: Performed by: HOSPITALIST

## 2022-12-12 PROCEDURE — 63710000001 INSULIN ASPART PER 5 UNITS: Performed by: HOSPITALIST

## 2022-12-12 PROCEDURE — 63710000001 INSULIN DETEMIR PER 5 UNITS: Performed by: INTERNAL MEDICINE

## 2022-12-12 PROCEDURE — 85027 COMPLETE CBC AUTOMATED: CPT | Performed by: STUDENT IN AN ORGANIZED HEALTH CARE EDUCATION/TRAINING PROGRAM

## 2022-12-12 PROCEDURE — 63710000001 INSULIN ASPART PER 5 UNITS: Performed by: INTERNAL MEDICINE

## 2022-12-12 PROCEDURE — 25010000002 CEFTRIAXONE PER 250 MG: Performed by: INTERNAL MEDICINE

## 2022-12-12 PROCEDURE — 25010000002 VANCOMYCIN 1 G RECONSTITUTED SOLUTION 1 EACH VIAL: Performed by: HOSPITALIST

## 2022-12-12 PROCEDURE — 80053 COMPREHEN METABOLIC PANEL: CPT | Performed by: STUDENT IN AN ORGANIZED HEALTH CARE EDUCATION/TRAINING PROGRAM

## 2022-12-12 PROCEDURE — 99232 SBSQ HOSP IP/OBS MODERATE 35: CPT | Performed by: INTERNAL MEDICINE

## 2022-12-12 RX ORDER — NYSTATIN 100000 [USP'U]/G
POWDER TOPICAL EVERY 12 HOURS SCHEDULED
Status: DISCONTINUED | OUTPATIENT
Start: 2022-12-12 | End: 2022-12-13 | Stop reason: HOSPADM

## 2022-12-12 RX ORDER — ACETAMINOPHEN 325 MG/1
650 TABLET ORAL EVERY 6 HOURS PRN
Status: DISCONTINUED | OUTPATIENT
Start: 2022-12-12 | End: 2022-12-13 | Stop reason: HOSPADM

## 2022-12-12 RX ORDER — INSULIN ASPART 100 [IU]/ML
15 INJECTION, SOLUTION INTRAVENOUS; SUBCUTANEOUS
Status: DISCONTINUED | OUTPATIENT
Start: 2022-12-12 | End: 2022-12-13 | Stop reason: HOSPADM

## 2022-12-12 RX ORDER — INSULIN ASPART 100 [IU]/ML
13 INJECTION, SOLUTION INTRAVENOUS; SUBCUTANEOUS
Status: DISCONTINUED | OUTPATIENT
Start: 2022-12-12 | End: 2022-12-12

## 2022-12-12 RX ORDER — SODIUM HYPOCHLORITE 1.25 MG/ML
SOLUTION TOPICAL 2 TIMES DAILY
Status: DISCONTINUED | OUTPATIENT
Start: 2022-12-12 | End: 2022-12-13 | Stop reason: HOSPADM

## 2022-12-12 RX ADMIN — NYSTATIN: 100000 POWDER TOPICAL at 20:41

## 2022-12-12 RX ADMIN — VANCOMYCIN HYDROCHLORIDE 1000 MG: 1 INJECTION, POWDER, LYOPHILIZED, FOR SOLUTION INTRAVENOUS at 12:18

## 2022-12-12 RX ADMIN — ATORVASTATIN CALCIUM 10 MG: 10 TABLET, FILM COATED ORAL at 20:39

## 2022-12-12 RX ADMIN — COLLAGENASE SANTYL 1 APPLICATION: 250 OINTMENT TOPICAL at 20:39

## 2022-12-12 RX ADMIN — APIXABAN 5 MG: 5 TABLET, FILM COATED ORAL at 08:05

## 2022-12-12 RX ADMIN — INSULIN ASPART 10 UNITS: 100 INJECTION, SOLUTION INTRAVENOUS; SUBCUTANEOUS at 08:02

## 2022-12-12 RX ADMIN — CARVEDILOL 12.5 MG: 6.25 TABLET, FILM COATED ORAL at 08:04

## 2022-12-12 RX ADMIN — SUCRALFATE 1 G: 1 TABLET ORAL at 08:05

## 2022-12-12 RX ADMIN — BACLOFEN 30 MG: 10 TABLET ORAL at 20:39

## 2022-12-12 RX ADMIN — INSULIN ASPART 20 UNITS: 100 INJECTION, SOLUTION INTRAVENOUS; SUBCUTANEOUS at 11:51

## 2022-12-12 RX ADMIN — DULOXETINE HYDROCHLORIDE 60 MG: 60 CAPSULE, DELAYED RELEASE ORAL at 20:39

## 2022-12-12 RX ADMIN — VANCOMYCIN HYDROCHLORIDE 1000 MG: 1 INJECTION, POWDER, LYOPHILIZED, FOR SOLUTION INTRAVENOUS at 00:58

## 2022-12-12 RX ADMIN — DICYCLOMINE HYDROCHLORIDE 10 MG: 10 CAPSULE ORAL at 20:39

## 2022-12-12 RX ADMIN — INSULIN DETEMIR 28 UNITS: 100 INJECTION, SOLUTION SUBCUTANEOUS at 20:40

## 2022-12-12 RX ADMIN — INSULIN ASPART 10 UNITS: 100 INJECTION, SOLUTION INTRAVENOUS; SUBCUTANEOUS at 11:51

## 2022-12-12 RX ADMIN — INSULIN ASPART 16 UNITS: 100 INJECTION, SOLUTION INTRAVENOUS; SUBCUTANEOUS at 18:23

## 2022-12-12 RX ADMIN — FERROUS SULFATE TAB 325 MG (65 MG ELEMENTAL FE) 325 MG: 325 (65 FE) TAB at 08:04

## 2022-12-12 RX ADMIN — DICYCLOMINE HYDROCHLORIDE 10 MG: 10 CAPSULE ORAL at 08:04

## 2022-12-12 RX ADMIN — INSULIN ASPART 20 UNITS: 100 INJECTION, SOLUTION INTRAVENOUS; SUBCUTANEOUS at 08:02

## 2022-12-12 RX ADMIN — ACETAMINOPHEN 650 MG: 325 TABLET, FILM COATED ORAL at 21:36

## 2022-12-12 RX ADMIN — INSULIN ASPART 15 UNITS: 100 INJECTION, SOLUTION INTRAVENOUS; SUBCUTANEOUS at 18:23

## 2022-12-12 RX ADMIN — NYSTATIN: 100000 POWDER TOPICAL at 03:28

## 2022-12-12 RX ADMIN — INSULIN ASPART 24 UNITS: 100 INJECTION, SOLUTION INTRAVENOUS; SUBCUTANEOUS at 20:40

## 2022-12-12 RX ADMIN — CEFTRIAXONE 2 G: 2 INJECTION, POWDER, FOR SOLUTION INTRAMUSCULAR; INTRAVENOUS at 14:09

## 2022-12-12 RX ADMIN — CHOLECALCIFEROL TAB 10 MCG (400 UNIT) 1000 UNITS: 10 TAB at 08:05

## 2022-12-12 RX ADMIN — BACLOFEN 30 MG: 10 TABLET ORAL at 18:23

## 2022-12-12 RX ADMIN — ARIPIPRAZOLE 10 MG: 10 TABLET ORAL at 20:39

## 2022-12-12 RX ADMIN — BACLOFEN 30 MG: 10 TABLET ORAL at 08:04

## 2022-12-12 RX ADMIN — Medication 10 ML: at 20:39

## 2022-12-12 RX ADMIN — Medication 1 CAPSULE: at 08:05

## 2022-12-12 RX ADMIN — INSULIN DETEMIR 25 UNITS: 100 INJECTION, SOLUTION SUBCUTANEOUS at 08:02

## 2022-12-12 RX ADMIN — PANTOPRAZOLE SODIUM 40 MG: 40 TABLET, DELAYED RELEASE ORAL at 05:30

## 2022-12-12 RX ADMIN — APIXABAN 5 MG: 5 TABLET, FILM COATED ORAL at 20:38

## 2022-12-12 RX ADMIN — OXYCODONE HYDROCHLORIDE AND ACETAMINOPHEN 500 MG: 500 TABLET ORAL at 08:04

## 2022-12-12 RX ADMIN — GABAPENTIN 800 MG: 400 CAPSULE ORAL at 05:30

## 2022-12-12 RX ADMIN — ASPIRIN 81 MG: 81 TABLET, COATED ORAL at 08:05

## 2022-12-12 RX ADMIN — GABAPENTIN 800 MG: 400 CAPSULE ORAL at 14:10

## 2022-12-12 RX ADMIN — Medication 10 ML: at 08:05

## 2022-12-12 RX ADMIN — VITAMINS A AND D OINTMENT 1 APPLICATION: 15.5; 53.4 OINTMENT TOPICAL at 20:38

## 2022-12-12 RX ADMIN — Medication 1 TABLET: at 08:05

## 2022-12-12 RX ADMIN — DULOXETINE HYDROCHLORIDE 60 MG: 60 CAPSULE, DELAYED RELEASE ORAL at 08:05

## 2022-12-12 RX ADMIN — CARVEDILOL 12.5 MG: 6.25 TABLET, FILM COATED ORAL at 18:23

## 2022-12-12 RX ADMIN — BACLOFEN 30 MG: 10 TABLET ORAL at 11:50

## 2022-12-12 RX ADMIN — MODAFINIL 200 MG: 100 TABLET ORAL at 08:05

## 2022-12-12 RX ADMIN — MAGNESIUM GLUCONATE 500 MG ORAL TABLET 1200 MG: 500 TABLET ORAL at 08:04

## 2022-12-12 RX ADMIN — SODIUM HYPOCHLORITE: 1.25 SOLUTION TOPICAL at 20:38

## 2022-12-12 RX ADMIN — COLLAGENASE SANTYL 1 APPLICATION: 250 OINTMENT TOPICAL at 08:05

## 2022-12-12 RX ADMIN — PIPERACILLIN SODIUM AND TAZOBACTAM SODIUM 4.5 G: 4; .5 INJECTION, POWDER, LYOPHILIZED, FOR SOLUTION INTRAVENOUS at 05:30

## 2022-12-12 RX ADMIN — GABAPENTIN 800 MG: 400 CAPSULE ORAL at 20:39

## 2022-12-12 NOTE — PLAN OF CARE
Goal Outcome Evaluation:      Pt is currently resting comfortably. No s/s of distress noted at this time. Pt's wound care is completed. Pt has had no complaints at this time. Pt has been receiving IV antibiotic treatment and has NS @ 50mL/hr. Will continue with plan of care.

## 2022-12-12 NOTE — CASE MANAGEMENT/SOCIAL WORK
Discharge Planning Assessment  [facility]     Patient Name: Ken Krueger  MRN: [id number]  Today's Date: 12/12/2022    Admit Date: 12/9/2022    Plan: Patient is a dependent disabled paraplegic who lives at home with his bother & uncle, where he plans to return at discharge.  Patient's brother Pineda Krueger is his POA and I havew requested that he bring in paperwork for us to scan and put in his charge.  Patient's PCP is Franchesca Hodges and he uses [facility] for his prescription needs.  Patient denies any insurance or financial issues at this time.  Patient does not utilize Home Health at this time however, he has uses [facility] before.  Patient does attend the [facility].  Patient has Nebulizer, Hospital Bed, Manual WC, Glucometer & Motorized WC from [facility].  Patient also uses Dexcom Insulin Pump.  Patient's family will provide transportation at discharge.  No other issues or concerns are noted.  CM will continut to follow and assist with any discharge needs.   Discharge Needs Assessment     Row Name 12/12/22 1150       Living Environment    People in Home sibling(s);other relative(s)    Name(s) of People in Home Brother Pineda & Uncle Umang    Current Living Arrangements home    Primary Care Provided by other (see comments)  Brother & Uncle    Provides Primary Care For no one, unable/limited ability to care for self    Family Caregiver if Needed sibling(s);other (see comments)    Family Caregiver Names Brother Pineda & Uncle Umang    Quality of Family Relationships helpful;involved;supportive    Able to Return to Prior Arrangements yes       Resource/Environmental Concerns    Resource/Environmental Concerns none    Transportation Concerns none       Transition Planning    Patient/Family Anticipates Transition to home with family    Patient/Family Anticipated Services at Transition home health care    Transportation Anticipated family or friend will provide       Discharge Needs Assessment     Readmission Within the Last 30 Days no previous admission in last 30 days    Current Outpatient/Agency/Support Group clinic(s)  Wound Care Clinic    Equipment Currently Used at Home glucometer;hospital bed;wheelchair;wheelchair, motorized;nebulizer    Concerns to be Addressed no discharge needs identified;denies needs/concerns at this time    Anticipated Changes Related to Illness none    Equipment Needed After Discharge none    Outpatient/Agency/Support Group Needs clinic(s)  Wound Care Clinic    Discharge Facility/Level of Care Needs home with home health    Provided Post Acute Provider List? Refused    Refused Provider List Comment Patient utilizes [facility]    Provided Post Acute Provider Quality & Resource List? Refused    Refused Quality and Resource List Comment Patient has utilized [facility] before.    Current Discharge Risk dependent with mobility/activities of daily living;physical impairment               Discharge Plan     Row Name 12/12/22 7440       Plan    Plan Patient is a dependent disabled paraplegic who lives at home with his bother & uncle, where he plans to return at discharge.  Patient's brother Pineda Krueger is his POA and I havew requested that he bring in paperwork for us to scan and put in his charge.  Patient's PCP is Franchesca Hodges and he uses [facility] for his prescription needs.  Patient denies any insurance or financial issues at this time.  Patient does not utilize Home Health at this time however, he has uses [facility] before.  Patient does attend the [facility].  Patient has Nebulizer, Hospital Bed, Manual WC, Glucometer & Motorized WC from [facility].  Patient also uses Dexcom Insulin Pump.  Patient's family will provide transportation at discharge.  No other issues or concerns are noted.  CM will continut to follow and assist with any discharge needs.    Patient/Family in Agreement with Plan yes    Row Name 12/12/22 1712       Plan    Plan  "Comments 12/12:  2L N/C, IV Zosyn, Vanco, NS, pNBP 1,056.0, Na+ 134, ALT 45, A1C 11.80, XR R foot=Small ulcer along posterior heel, extensive dorsal foot & anterior ankle soft tissue swelling compatible with cellulitis, CT A/P=Lg B decub ulcer extending to ischial tuberosities, chronic fx of R proximal femur, evidence of cystocele, prostate glad small or absent, Hepatic steatosis, multiple Stage 4 decubs on buttocks, BG \"too high\" X 3 wks, urine cloudy & foul smelling, Pt has colostomy & urostomy, No surgical intervention at this time, Home @ [facility]              Continued Care and Services - Admitted Since 12/9/2022     Durable Medical Equipment     Service Provider Request Status Selected Services Address Phone Fax Patient Preferred    [facility] Pending - No Request Sent N/A [id number] [place] [address] [phone] --          Home Medical Care     Service Provider Request Status Selected Services Address Phone Fax Patient Preferred    [facility] - [facility] [address] [facility] [address] [phone] --              Expected Discharge Date and Time     Expected Discharge Date Expected Discharge Time    Dec 12, 2022          Demographic Summary     Row Name 12/12/22 1149       General Information    Admission Type inpatient    Arrived From home;emergency department    Referral Source admission list;high risk screening    Reason for Consult discharge planning;other (see comments)  CM Trigger Sepsis    Preferred Language English               Functional Status     Row Name 12/12/22 1150       Functional Status    Usual Activity Tolerance poor    Current Activity Tolerance poor       Functional Status, IADL    Medications completely dependent    Meal Preparation completely dependent    Housekeeping completely dependent    Laundry completely dependent    Shopping completely dependent       Mental Status Summary    Recent Changes in Mental " Status/Cognitive Functioning no changes       Employment/    Employment Status disabled    Current or Previous Occupation factory work               Psychosocial    No documentation.                Abuse/Neglect    No documentation.                Legal    No documentation.                Substance Abuse    No documentation.                Patient Forms    No documentation.                   Rosa Caro RN

## 2022-12-12 NOTE — PLAN OF CARE
Goal Outcome Evaluation: Patient has been pleasant but frequent calls out to staff. Patient has been compliant with care. Patient remains on 2L/NC, saturations remaining above 90%. Wound care completed. Patient in no acute distress at this time. Patient currently resting in bed. Will continue with plan of care.

## 2022-12-12 NOTE — PHARMACY PATIENT ASSISTANCE
Pharmacy checked on the price of the patients home eliquis and he had no copay at his last fill. Do not see any issues at this time.    Thank you,    Bella Becerra, Pharmacy Intern  12/12/22  11:04 EST
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
## 2022-12-12 NOTE — NURSING NOTE
Wound consult for denuded area to the sacrum, LT buttock, RT buttock, RT heel, and RT ankle. Sacral wound and RT ankle presents as an un-stageable pressure ulcer. LT buttock and RT buttock present as stage 4's and the RT ankle presets as a DTI. Outpatient PA managing wound care at this time.
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
## 2022-12-13 ENCOUNTER — READMISSION MANAGEMENT (OUTPATIENT)
Dept: CALL CENTER | Facility: HOSPITAL | Age: 47
End: 2022-12-13

## 2022-12-13 VITALS
TEMPERATURE: 98.4 F | HEIGHT: 71 IN | BODY MASS INDEX: 43.52 KG/M2 | OXYGEN SATURATION: 97 % | WEIGHT: 310.9 LBS | DIASTOLIC BLOOD PRESSURE: 65 MMHG | HEART RATE: 83 BPM | RESPIRATION RATE: 18 BRPM | SYSTOLIC BLOOD PRESSURE: 111 MMHG

## 2022-12-13 LAB
ALBUMIN SERPL-MCNC: 2.95 G/DL (ref 3.5–5.2)
ALBUMIN/GLOB SERPL: 0.7 G/DL
ALP SERPL-CCNC: 107 U/L (ref 39–117)
ALT SERPL W P-5'-P-CCNC: 45 U/L (ref 1–41)
ANION GAP SERPL CALCULATED.3IONS-SCNC: 10.5 MMOL/L (ref 5–15)
AST SERPL-CCNC: 25 U/L (ref 1–40)
ATMOSPHERIC PRESS: 730 MMHG
BASE EXCESS BLDV CALC-SCNC: -0.4 MMOL/L (ref 0–2)
BASOPHILS # BLD AUTO: 0.04 10*3/MM3 (ref 0–0.2)
BASOPHILS NFR BLD AUTO: 0.5 % (ref 0–1.5)
BDY SITE: ABNORMAL
BILIRUB SERPL-MCNC: 0.2 MG/DL (ref 0–1.2)
BODY TEMPERATURE: 37 C
BUN SERPL-MCNC: 22 MG/DL (ref 6–20)
BUN/CREAT SERPL: 22.9 (ref 7–25)
CALCIUM SPEC-SCNC: 9 MG/DL (ref 8.6–10.5)
CHLORIDE SERPL-SCNC: 101 MMOL/L (ref 98–107)
CO2 BLDA-SCNC: 27.4 MMOL/L (ref 22–33)
CO2 SERPL-SCNC: 21.5 MMOL/L (ref 22–29)
COHGB MFR BLD: 1.2 % (ref 0–5)
CREAT SERPL-MCNC: 0.96 MG/DL (ref 0.76–1.27)
DEPRECATED RDW RBC AUTO: 49.2 FL (ref 37–54)
EGFRCR SERPLBLD CKD-EPI 2021: 99.3 ML/MIN/1.73
EOSINOPHIL # BLD AUTO: 0.17 10*3/MM3 (ref 0–0.4)
EOSINOPHIL NFR BLD AUTO: 2 % (ref 0.3–6.2)
ERYTHROCYTE [DISTWIDTH] IN BLOOD BY AUTOMATED COUNT: 15.3 % (ref 12.3–15.4)
GAS FLOW AIRWAY: 2 LPM
GLOBULIN UR ELPH-MCNC: 4.2 GM/DL
GLUCOSE BLDC GLUCOMTR-MCNC: 282 MG/DL (ref 70–130)
GLUCOSE BLDC GLUCOMTR-MCNC: 362 MG/DL (ref 70–130)
GLUCOSE BLDC GLUCOMTR-MCNC: 382 MG/DL (ref 70–130)
GLUCOSE SERPL-MCNC: 384 MG/DL (ref 65–99)
HCO3 BLDV-SCNC: 25.9 MMOL/L (ref 22–28)
HCT VFR BLD AUTO: 37 % (ref 37.5–51)
HGB BLD-MCNC: 11.1 G/DL (ref 13–17.7)
HGB BLDA-MCNC: 11.1 G/DL (ref 14–18)
IMM GRANULOCYTES # BLD AUTO: 0.05 10*3/MM3 (ref 0–0.05)
IMM GRANULOCYTES NFR BLD AUTO: 0.6 % (ref 0–0.5)
INHALED O2 CONCENTRATION: 28 %
LYMPHOCYTES # BLD AUTO: 1.54 10*3/MM3 (ref 0.7–3.1)
LYMPHOCYTES NFR BLD AUTO: 17.8 % (ref 19.6–45.3)
Lab: ABNORMAL
MAGNESIUM SERPL-MCNC: 2 MG/DL (ref 1.6–2.6)
MCH RBC QN AUTO: 26.5 PG (ref 26.6–33)
MCHC RBC AUTO-ENTMCNC: 30 G/DL (ref 31.5–35.7)
MCV RBC AUTO: 88.3 FL (ref 79–97)
METHGB BLD QL: 0.4 % (ref 0–3)
MODALITY: ABNORMAL
MONOCYTES # BLD AUTO: 0.3 10*3/MM3 (ref 0.1–0.9)
MONOCYTES NFR BLD AUTO: 3.5 % (ref 5–12)
NEUTROPHILS NFR BLD AUTO: 6.54 10*3/MM3 (ref 1.7–7)
NEUTROPHILS NFR BLD AUTO: 75.6 % (ref 42.7–76)
NRBC BLD AUTO-RTO: 0 /100 WBC (ref 0–0.2)
OXYHGB MFR BLDV: 90.1 % (ref 45–75)
PCO2 BLDV: 48.5 MM HG (ref 41–51)
PH BLDV: 7.33 PH UNITS (ref 7.32–7.42)
PHOSPHATE SERPL-MCNC: 4.3 MG/DL (ref 2.5–4.5)
PLATELET # BLD AUTO: 279 10*3/MM3 (ref 140–450)
PMV BLD AUTO: 10.1 FL (ref 6–12)
PO2 BLDV: 64.3 MM HG (ref 27–53)
POTASSIUM SERPL-SCNC: 4 MMOL/L (ref 3.5–5.2)
PROT SERPL-MCNC: 7.1 G/DL (ref 6–8.5)
RBC # BLD AUTO: 4.19 10*6/MM3 (ref 4.14–5.8)
SAO2 % BLDCOV: 91.6 % (ref 45–75)
SODIUM SERPL-SCNC: 133 MMOL/L (ref 136–145)
VENTILATOR MODE: ABNORMAL
WBC NRBC COR # BLD: 8.64 10*3/MM3 (ref 3.4–10.8)

## 2022-12-13 PROCEDURE — 84100 ASSAY OF PHOSPHORUS: CPT | Performed by: INTERNAL MEDICINE

## 2022-12-13 PROCEDURE — 80053 COMPREHEN METABOLIC PANEL: CPT | Performed by: INTERNAL MEDICINE

## 2022-12-13 PROCEDURE — 63710000001 INSULIN DETEMIR PER 5 UNITS: Performed by: INTERNAL MEDICINE

## 2022-12-13 PROCEDURE — 82962 GLUCOSE BLOOD TEST: CPT

## 2022-12-13 PROCEDURE — 63710000001 INSULIN ASPART PER 5 UNITS: Performed by: INTERNAL MEDICINE

## 2022-12-13 PROCEDURE — 99239 HOSP IP/OBS DSCHRG MGMT >30: CPT | Performed by: INTERNAL MEDICINE

## 2022-12-13 PROCEDURE — 82805 BLOOD GASES W/O2 SATURATION: CPT

## 2022-12-13 PROCEDURE — 85025 COMPLETE CBC W/AUTO DIFF WBC: CPT | Performed by: INTERNAL MEDICINE

## 2022-12-13 PROCEDURE — 82820 HEMOGLOBIN-OXYGEN AFFINITY: CPT

## 2022-12-13 PROCEDURE — 83735 ASSAY OF MAGNESIUM: CPT | Performed by: INTERNAL MEDICINE

## 2022-12-13 PROCEDURE — 63710000001 INSULIN ASPART PER 5 UNITS: Performed by: HOSPITALIST

## 2022-12-13 RX ORDER — NICOTINE POLACRILEX 4 MG
15 LOZENGE BUCCAL
Qty: 37.5 G | Refills: 0 | Status: ON HOLD | OUTPATIENT
Start: 2022-12-13 | End: 2023-02-13

## 2022-12-13 RX ORDER — IBUPROFEN 600 MG/1
1 TABLET ORAL
Qty: 1 EACH | Refills: 3 | Status: ON HOLD | OUTPATIENT
Start: 2022-12-13 | End: 2023-02-13

## 2022-12-13 RX ORDER — NICOTINE POLACRILEX 4 MG
15 LOZENGE BUCCAL
Qty: 37.5 G | Refills: 0 | Status: SHIPPED | OUTPATIENT
Start: 2022-12-13 | End: 2022-12-13 | Stop reason: SDUPTHER

## 2022-12-13 RX ORDER — NYSTATIN 100000 [USP'U]/G
POWDER TOPICAL EVERY 12 HOURS SCHEDULED
Qty: 60 G | Refills: 0 | Status: SHIPPED | OUTPATIENT
Start: 2022-12-13 | End: 2022-12-13 | Stop reason: SDUPTHER

## 2022-12-13 RX ORDER — NYSTATIN 100000 [USP'U]/G
POWDER TOPICAL EVERY 12 HOURS SCHEDULED
Qty: 60 G | Refills: 0 | Status: ON HOLD | OUTPATIENT
Start: 2022-12-13 | End: 2023-02-21

## 2022-12-13 RX ORDER — IBUPROFEN 600 MG/1
1 TABLET ORAL
Qty: 1 EACH | Refills: 3 | Status: SHIPPED | OUTPATIENT
Start: 2022-12-13 | End: 2022-12-13 | Stop reason: SDUPTHER

## 2022-12-13 RX ADMIN — INSULIN ASPART 15 UNITS: 100 INJECTION, SOLUTION INTRAVENOUS; SUBCUTANEOUS at 11:43

## 2022-12-13 RX ADMIN — Medication 1 TABLET: at 09:04

## 2022-12-13 RX ADMIN — Medication 10 ML: at 09:09

## 2022-12-13 RX ADMIN — PANTOPRAZOLE SODIUM 40 MG: 40 TABLET, DELAYED RELEASE ORAL at 05:52

## 2022-12-13 RX ADMIN — NYSTATIN: 100000 POWDER TOPICAL at 09:10

## 2022-12-13 RX ADMIN — VITAMINS A AND D OINTMENT 1 APPLICATION: 15.5; 53.4 OINTMENT TOPICAL at 09:10

## 2022-12-13 RX ADMIN — CARVEDILOL 12.5 MG: 6.25 TABLET, FILM COATED ORAL at 09:05

## 2022-12-13 RX ADMIN — DICYCLOMINE HYDROCHLORIDE 10 MG: 10 CAPSULE ORAL at 09:11

## 2022-12-13 RX ADMIN — GABAPENTIN 800 MG: 400 CAPSULE ORAL at 05:52

## 2022-12-13 RX ADMIN — INSULIN DETEMIR 32 UNITS: 100 INJECTION, SOLUTION SUBCUTANEOUS at 09:06

## 2022-12-13 RX ADMIN — DULOXETINE HYDROCHLORIDE 60 MG: 60 CAPSULE, DELAYED RELEASE ORAL at 09:04

## 2022-12-13 RX ADMIN — INSULIN ASPART 20 UNITS: 100 INJECTION, SOLUTION INTRAVENOUS; SUBCUTANEOUS at 11:43

## 2022-12-13 RX ADMIN — ASPIRIN 81 MG: 81 TABLET, COATED ORAL at 09:05

## 2022-12-13 RX ADMIN — MODAFINIL 200 MG: 100 TABLET ORAL at 09:04

## 2022-12-13 RX ADMIN — BACLOFEN 30 MG: 10 TABLET ORAL at 09:04

## 2022-12-13 RX ADMIN — SUCRALFATE 1 G: 1 TABLET ORAL at 09:05

## 2022-12-13 RX ADMIN — Medication 1 CAPSULE: at 09:05

## 2022-12-13 RX ADMIN — CHOLECALCIFEROL TAB 10 MCG (400 UNIT) 1000 UNITS: 10 TAB at 09:05

## 2022-12-13 RX ADMIN — COLLAGENASE SANTYL 1 APPLICATION: 250 OINTMENT TOPICAL at 09:11

## 2022-12-13 RX ADMIN — INSULIN ASPART 12 UNITS: 100 INJECTION, SOLUTION INTRAVENOUS; SUBCUTANEOUS at 09:07

## 2022-12-13 RX ADMIN — INSULIN ASPART 15 UNITS: 100 INJECTION, SOLUTION INTRAVENOUS; SUBCUTANEOUS at 09:07

## 2022-12-13 RX ADMIN — OXYCODONE HYDROCHLORIDE AND ACETAMINOPHEN 500 MG: 500 TABLET ORAL at 09:04

## 2022-12-13 RX ADMIN — MAGNESIUM GLUCONATE 500 MG ORAL TABLET 1200 MG: 500 TABLET ORAL at 09:05

## 2022-12-13 RX ADMIN — FERROUS SULFATE TAB 325 MG (65 MG ELEMENTAL FE) 325 MG: 325 (65 FE) TAB at 09:06

## 2022-12-13 RX ADMIN — SODIUM HYPOCHLORITE: 1.25 SOLUTION TOPICAL at 09:10

## 2022-12-13 RX ADMIN — BACLOFEN 30 MG: 10 TABLET ORAL at 11:42

## 2022-12-13 RX ADMIN — APIXABAN 5 MG: 5 TABLET, FILM COATED ORAL at 09:04

## 2022-12-13 NOTE — PAYOR COMM NOTE
"CONTACT:  GLORIA LIANG MSN, APRN  UTILIZATION MANAGEMENT DEPT.  [facility]  [address] [facility]  [place], [address]  PHONE:  [phone]  FAX: [phone]    Patient discharged to home on 12/13/22, awaiting authorization    Ken Deng (47 y.o. Male)     Date of Birth   1975    Social Security Number       Address   [address] [facility] [address]    Home Phone   [phone]    MRN   [id number]       Christianity   Latter day    Marital Status                               Admission Date   12/9/22    Admission Type   Emergency    Admitting Provider   Carlin Landers MD    Attending Provider       Department, Room/Bed   [facility]/       Discharge Date   12/13/2022    Discharge Disposition   Home or Self Care    Discharge Destination                               Attending Provider: (none)   Allergies: Keflex [Cephalexin]    Isolation: None   Infection: None   Code Status: CPR    Ht: 180.3 cm (71\")   Wt: 141 kg (310 lb 14.4 oz)    Admission Cmt: None   Principal Problem: Hyperglycemia due to diabetes mellitus (HCC) [E11.65]                 Active Insurance as of 12/9/2022     Primary Coverage     Payor Plan Insurance Group Employer/Plan Group    [employer] [place] MEDICARE REPLACEMENT WELL[employer] MEDICARE REPLACEMENT      Payor Plan Address Payor Plan Phone Number Payor Plan Fax Number Effective Dates    [address] [address] [phone]  5/1/2021 - None Entered    [facility] [address]       Subscriber Name Subscriber Birth Date Member ID       KEN DENG 1975 [id number]           Secondary Coverage     Payor Plan Insurance Group Employer/Plan Group    [place]      Payor Plan Address Payor Plan Phone Number Payor Plan Fax Number Effective Dates    [address] [phone]  7/13/2019 - None Entered    [facility] [id number]       Subscriber Name Subscriber Birth Date Member ID       KEN DENG 1975 [id number]                 Emergency " Contacts      (Rel.) Home Phone Work Phone Mobile Phone    Pineda Krueger (Power of ) [phone] -- --            Discharge Summary    No notes of this type exist for this encounter.

## 2022-12-13 NOTE — PLAN OF CARE
Goal Outcome Evaluation: Patient has been very pleasant. Complaint with care. Patient remains on 2L/NC, saturations remaining in the 90%. Patient in no acute distress at this time. Patient currently resting in bed. Will continue with plan of care.       Patient to be D/C on this. IV out. Telemetry monitor return. Patient brother to be personal van to be D/C.                   patient is a 25 y/o female presenting with severe sore throat and myalgia found to have leukocytosis of 30K

## 2022-12-13 NOTE — CASE MANAGEMENT/SOCIAL WORK
Case Management Discharge Note      Final Note: Patient being discharged home on this date 12/13/22.  No needs identified.    Provided Post Acute Provider List?: Refused  Refused Provider List Comment: Patient utilizes [facility]  Provided Post Acute Provider Quality & Resource List?: Refused  Refused Quality and Resource List Comment: Patient has utilized [facility] before.    Selected Continued Care - Admitted Since 12/9/2022      Final Discharge Disposition Code: 01 - home or self-care

## 2022-12-13 NOTE — PLAN OF CARE
Goal Outcome Evaluation:      Pt is currently resting comfortably. No s/s of distress noted at this time. Wound care has been completed. Pt requested tylenol for pain, orders obtained and given- see MAR. Will continue with plan of care.

## 2022-12-13 NOTE — CONSULTS
"Diabetes Education  Assessment/Teaching    Patient Name:  Ken Krueger  YOB: 1975  MRN: [id number]  Admit Date:  12/9/2022      Assessment Date:  12/13/2022  Flowsheet Row Most Recent Value   General Information     Height 180.3 cm (71\")   Height Method Stated   Weight 141 kg (310 lb 14.4 oz)   Weight Method Bed scale   Pregnancy Assessment    Diabetes History    Education Preferences    Nutrition Information    Assessment Topics    DM Goals           Flowsheet Row Most Recent Value   DM Education Needs    Meter Has own   Meter Type Other (comment)  [Dexcom CGM]   Frequency of Testing Other (comment)  [DEXCOM CGM]   Medication Insulin, Oral   Problem Solving Hypoglycemia, Hyperglycemia, Sick days, Signs, Symptoms, Treatment   Reducing Risks Cardiovascular, Immunizations, Foot care, Dental exam, Eye exam, Blood pressure, Lipids, A1C testing, Neuropathy, Retinopathy   Healthy Eating Basic meal plan provided   Physical Activity Frequency Rarely   Healthy Coping Appropriate   Discharge Plan Home, Follow-up with PCP   Motivation Moderate, Not interested   Teaching Method Explanation, Discussion, Handouts   Patient Response Verbalized understanding            Other Comments:  A1C 11.8 Patient did not wear a mask. Patient was educated and received AADE7 and ADA handouts on diet, activity, checking blood glucose, taking medication as prescribed,  and S/S of hypo/hyperglycemia. Patient was educated on sick rule days. Patient had no questions or concerns. Please re-consult or call for concerns or questions. Thank you        Electronically signed by:  Cary Kaur RN  12/13/22 12:56 EST  "
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
## 2022-12-14 ENCOUNTER — READMISSION MANAGEMENT (OUTPATIENT)
Dept: CALL CENTER | Facility: HOSPITAL | Age: 47
End: 2022-12-14

## 2022-12-14 LAB
BACTERIA SPEC AEROBE CULT: NORMAL
BACTERIA SPEC AEROBE CULT: NORMAL

## 2022-12-14 NOTE — OUTREACH NOTE
Medical Week 1 Survey    Flowsheet Row Responses   [facility] patient discharged from? [facility]   Does the patient have one of the following disease processes/diagnoses(primary or secondary)? Other   Week 1 attempt successful? Yes   Call start time 1544   Call end time 1548   Discharge diagnosis Hyperglycemia due to diabetes mellitus   Meds reviewed with patient/caregiver? Yes   Is the patient having any side effects they believe may be caused by any medication additions or changes? No   Does the patient have all medications ordered at discharge? Yes   Is the patient taking all medications as directed (includes completed medication regime)? Yes   Does the patient have a primary care provider?  Yes   Does the patient have an appointment with their PCP within 7 days of discharge? Yes   Comments regarding PCP PCP DOES HOME VISITS AND WILL BE THERE TO SEE HIM TOMORROW   Has the patient kept scheduled appointments due by today? N/A   What is the Home health agency?  [facility]   Has home health visited the patient within 72 hours of discharge? Yes   Psychosocial issues? No   Did the patient receive a copy of their discharge instructions? Yes   Nursing interventions Reviewed instructions with patient   What is the patient's perception of their health status since discharge? Improving   Is the patient/caregiver able to teach back signs and symptoms related to disease process for when to call PCP? Yes   Is the patient/caregiver able to teach back signs and symptoms related to disease process for when to call 911? Yes   Is the patient/caregiver able to teach back the hierarchy of who to call/visit for symptoms/problems? PCP, Specialist, Home health nurse, Urgent Care, ED, 911 Yes   If the patient is a current smoker, are they able to teach back resources for cessation? Not a smoker   Week 1 call completed? Yes          VINAY DALE - Licensed Nurse

## 2022-12-14 NOTE — OUTREACH NOTE
Prep Survey    Flowsheet Row Responses   [facility] facility patient discharged from? [facility]   Is LACE score < 7 ? No   Eligibility Readm Mgmt   Discharge diagnosis Hyperglycemia due to diabetes mellitus   Does the patient have one of the following disease processes/diagnoses(primary or secondary)? Other   Does the patient have Home health ordered? Yes   What is the Home health agency?  [facility]   Is there a DME ordered? No   Medication alerts for this patient see AVS   Prep survey completed? Yes          JENS LIMA - Registered Nurse

## 2022-12-15 ENCOUNTER — APPOINTMENT (OUTPATIENT)
Dept: CARDIOLOGY | Facility: HOSPITAL | Age: 47
End: 2022-12-15
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
## 2022-12-21 ENCOUNTER — OFFICE VISIT (OUTPATIENT)
Dept: ONCOLOGY | Facility: CLINIC | Age: 47
End: 2022-12-21

## 2022-12-21 VITALS
DIASTOLIC BLOOD PRESSURE: 73 MMHG | HEART RATE: 80 BPM | TEMPERATURE: 97.5 F | RESPIRATION RATE: 18 BRPM | SYSTOLIC BLOOD PRESSURE: 107 MMHG | OXYGEN SATURATION: 98 %

## 2022-12-21 DIAGNOSIS — D75.829 HIT (HEPARIN-INDUCED THROMBOCYTOPENIA): Primary | ICD-10-CM

## 2022-12-21 LAB
BASOPHILS # BLD AUTO: 0.04 10*3/MM3 (ref 0–0.2)
BASOPHILS NFR BLD AUTO: 0.4 % (ref 0–1.5)
DEPRECATED RDW RBC AUTO: 47.8 FL (ref 37–54)
EOSINOPHIL # BLD AUTO: 0.11 10*3/MM3 (ref 0–0.4)
EOSINOPHIL NFR BLD AUTO: 1.1 % (ref 0.3–6.2)
ERYTHROCYTE [DISTWIDTH] IN BLOOD BY AUTOMATED COUNT: 15 % (ref 12.3–15.4)
HCT VFR BLD AUTO: 36.9 % (ref 37.5–51)
HGB BLD-MCNC: 11.6 G/DL (ref 13–17.7)
IMM GRANULOCYTES # BLD AUTO: 0.04 10*3/MM3 (ref 0–0.05)
IMM GRANULOCYTES NFR BLD AUTO: 0.4 % (ref 0–0.5)
LYMPHOCYTES # BLD AUTO: 1.65 10*3/MM3 (ref 0.7–3.1)
LYMPHOCYTES NFR BLD AUTO: 16.3 % (ref 19.6–45.3)
MCH RBC QN AUTO: 27.6 PG (ref 26.6–33)
MCHC RBC AUTO-ENTMCNC: 31.4 G/DL (ref 31.5–35.7)
MCV RBC AUTO: 87.9 FL (ref 79–97)
MONOCYTES # BLD AUTO: 0.35 10*3/MM3 (ref 0.1–0.9)
MONOCYTES NFR BLD AUTO: 3.5 % (ref 5–12)
NEUTROPHILS NFR BLD AUTO: 7.94 10*3/MM3 (ref 1.7–7)
NEUTROPHILS NFR BLD AUTO: 78.3 % (ref 42.7–76)
NRBC BLD AUTO-RTO: 0 /100 WBC (ref 0–0.2)
PLATELET # BLD AUTO: 306 10*3/MM3 (ref 140–450)
PMV BLD AUTO: 9.7 FL (ref 6–12)
RBC # BLD AUTO: 4.2 10*6/MM3 (ref 4.14–5.8)
WBC NRBC COR # BLD: 10.13 10*3/MM3 (ref 3.4–10.8)

## 2022-12-21 PROCEDURE — 99214 OFFICE O/P EST MOD 30 MIN: CPT | Performed by: INTERNAL MEDICINE

## 2022-12-21 PROCEDURE — 85025 COMPLETE CBC W/AUTO DIFF WBC: CPT | Performed by: INTERNAL MEDICINE
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
## 2022-12-22 ENCOUNTER — APPOINTMENT (OUTPATIENT)
Dept: CARDIOLOGY | Facility: HOSPITAL | Age: 47
End: 2022-12-22

## 2022-12-22 ENCOUNTER — READMISSION MANAGEMENT (OUTPATIENT)
Dept: CALL CENTER | Facility: HOSPITAL | Age: 47
End: 2022-12-22

## 2022-12-22 NOTE — OUTREACH NOTE
Medical Week 2 Survey    Flowsheet Row Responses   [facility] patient discharged from? [facility]   Does the patient have one of the following disease processes/diagnoses(primary or secondary)? Other   Week 2 attempt successful? Yes   Call start time 1055   Discharge diagnosis Hyperglycemia due to diabetes mellitus   Call end time 1057   Meds reviewed with patient/caregiver? Yes   Is the patient having any side effects they believe may be caused by any medication additions or changes? No   Does the patient have all medications ordered at discharge? Yes   Is the patient taking all medications as directed (includes completed medication regime)? Yes   Medication comments Meds increased again yesterday 30 units Levemir and 25 units on novolog.   Does the patient have a primary care provider?  Yes   Does the patient have an appointment with their PCP within 7 days of discharge? Yes   Has the patient kept scheduled appointments due by today? Yes   What is the Home health agency?  [facility]   Has home health visited the patient within 72 hours of discharge? No   Psychosocial issues? No   Did the patient receive a copy of their discharge instructions? Yes   Nursing interventions Reviewed instructions with patient   What is the patient's perception of their health status since discharge? Same   Is the patient/caregiver able to teach back signs and symptoms related to disease process for when to call PCP? Yes   Is the patient/caregiver able to teach back signs and symptoms related to disease process for when to call 911? Yes   Is the patient/caregiver able to teach back the hierarchy of who to call/visit for symptoms/problems? PCP, Specialist, Home health nurse, Urgent Care, ED, 911 Yes   If the patient is a current smoker, are they able to teach back resources for cessation? Not a smoker   Additional teach back comments Encouraged dm educators at the hospital.   Week 2 Call Completed? Yes   Is the patient  interested in additional calls from an ambulatory ?  NOTE:  applies to high risk patients requiring additional follow-up. No   Wrap up additional comments BS still staying up he says.          BETH LIMA - Registered Nurse

## 2022-12-28 ENCOUNTER — HOSPITAL ENCOUNTER (OUTPATIENT)
Dept: WOUND CARE | Facility: HOSPITAL | Age: 47
Discharge: HOME | End: 2022-12-28
Admitting: NURSE PRACTITIONER
Payer: MEDICARE

## 2022-12-28 VITALS
RESPIRATION RATE: 18 BRPM | TEMPERATURE: 98.5 F | DIASTOLIC BLOOD PRESSURE: 74 MMHG | HEART RATE: 84 BPM | SYSTOLIC BLOOD PRESSURE: 139 MMHG

## 2022-12-28 DIAGNOSIS — L89.314 PRESSURE INJURY OF RIGHT BUTTOCK, STAGE 4: Primary | ICD-10-CM

## 2022-12-28 DIAGNOSIS — L97.411 DIABETIC ULCER OF RIGHT HEEL ASSOCIATED WITH DIABETES MELLITUS DUE TO UNDERLYING CONDITION, LIMITED TO BREAKDOWN OF SKIN: ICD-10-CM

## 2022-12-28 DIAGNOSIS — L89.002 PRESSURE INJURY OF ELBOW, STAGE 2, UNSPECIFIED LATERALITY: ICD-10-CM

## 2022-12-28 DIAGNOSIS — L89.310 PRESSURE INJURY OF RIGHT BUTTOCK, UNSTAGEABLE: ICD-10-CM

## 2022-12-28 DIAGNOSIS — L89.324 PRESSURE INJURY OF LEFT BUTTOCK, STAGE 4: ICD-10-CM

## 2022-12-28 DIAGNOSIS — E08.621 DIABETIC ULCER OF RIGHT HEEL ASSOCIATED WITH DIABETES MELLITUS DUE TO UNDERLYING CONDITION, LIMITED TO BREAKDOWN OF SKIN: ICD-10-CM

## 2022-12-28 PROCEDURE — A9270 NON-COVERED ITEM OR SERVICE: HCPCS | Performed by: NURSE PRACTITIONER

## 2022-12-28 PROCEDURE — 63710000001 ALUMINUM-MAGNESIUM HYDROXIDE-SIMETHICONE 200-200-20 MG/5ML SUSPENSION: Performed by: NURSE PRACTITIONER

## 2022-12-28 PROCEDURE — 63710000001 ZINC OXIDE 20 % OINTMENT 454 G JAR: Performed by: NURSE PRACTITIONER

## 2022-12-28 PROCEDURE — 63710000001 A&D OINTMENT 425 G JAR: Performed by: NURSE PRACTITIONER

## 2022-12-28 PROCEDURE — 63710000001 CLOTRIMAZOLE 1 % CREAM 30 G TUBE: Performed by: NURSE PRACTITIONER

## 2022-12-28 PROCEDURE — 97602 WOUND(S) CARE NON-SELECTIVE: CPT

## 2022-12-28 RX ORDER — LIDOCAINE HYDROCHLORIDE 20 MG/ML
JELLY TOPICAL AS NEEDED
Status: CANCELLED | OUTPATIENT
Start: 2022-12-28

## 2022-12-28 RX ORDER — LIDOCAINE HYDROCHLORIDE 20 MG/ML
JELLY TOPICAL AS NEEDED
Status: CANCELLED
Start: 2022-12-28

## 2022-12-28 RX ORDER — CASTOR OIL AND BALSAM, PERU 788; 87 MG/G; MG/G
1 OINTMENT TOPICAL AS NEEDED
Status: CANCELLED | OUTPATIENT
Start: 2022-12-28

## 2022-12-28 RX ORDER — SODIUM HYPOCHLORITE 2.5 MG/ML
1 SOLUTION TOPICAL AS NEEDED
Status: CANCELLED | OUTPATIENT
Start: 2022-12-28

## 2022-12-28 RX ORDER — SODIUM HYPOCHLORITE 1.25 MG/ML
1 SOLUTION TOPICAL AS NEEDED
Status: CANCELLED | OUTPATIENT
Start: 2022-12-28

## 2022-12-28 RX ADMIN — VITAMINS A AND D OINTMENT 2 APPLICATION: 15.5; 53.4 OINTMENT TOPICAL at 14:16

## 2022-12-29 ENCOUNTER — HOSPITAL ENCOUNTER (OUTPATIENT)
Dept: CARDIOLOGY | Facility: HOSPITAL | Age: 47
Discharge: HOME | End: 2022-12-29
Admitting: PHYSICIAN ASSISTANT

## 2022-12-29 VITALS — SYSTOLIC BLOOD PRESSURE: 130 MMHG | HEART RATE: 50 BPM | OXYGEN SATURATION: 94 % | DIASTOLIC BLOOD PRESSURE: 72 MMHG

## 2022-12-29 DIAGNOSIS — Z79.01 CHRONIC ANTICOAGULATION: ICD-10-CM

## 2022-12-29 DIAGNOSIS — I50.22 CHRONIC HFREF (HEART FAILURE WITH REDUCED EJECTION FRACTION): Primary | ICD-10-CM

## 2022-12-29 DIAGNOSIS — G47.33 OSA ON CPAP: ICD-10-CM

## 2022-12-29 DIAGNOSIS — I42.0 CARDIOMYOPATHY, DILATED: ICD-10-CM

## 2022-12-29 DIAGNOSIS — Z99.89 OSA ON CPAP: ICD-10-CM

## 2022-12-29 LAB
ABSOLUTE LUNG FLUID CONTENT: 38 % (ref 20–35)
ANION GAP SERPL CALCULATED.3IONS-SCNC: 16.1 MMOL/L (ref 5–15)
BUN SERPL-MCNC: 19 MG/DL (ref 6–20)
BUN/CREAT SERPL: 20 (ref 7–25)
CALCIUM SPEC-SCNC: 9 MG/DL (ref 8.6–10.5)
CHLORIDE SERPL-SCNC: 100 MMOL/L (ref 98–107)
CO2 SERPL-SCNC: 21.9 MMOL/L (ref 22–29)
CREAT SERPL-MCNC: 0.95 MG/DL (ref 0.76–1.27)
EGFRCR SERPLBLD CKD-EPI 2021: 100.6 ML/MIN/1.73
GLUCOSE SERPL-MCNC: 431 MG/DL (ref 65–99)
MAGNESIUM SERPL-MCNC: 1.3 MG/DL (ref 1.6–2.6)
NT-PROBNP SERPL-MCNC: 1103 PG/ML (ref 0–450)
POTASSIUM SERPL-SCNC: 3.3 MMOL/L (ref 3.5–5.2)
SODIUM SERPL-SCNC: 138 MMOL/L (ref 136–145)

## 2022-12-29 PROCEDURE — 83735 ASSAY OF MAGNESIUM: CPT | Performed by: PHYSICIAN ASSISTANT

## 2022-12-29 PROCEDURE — 99215 OFFICE O/P EST HI 40 MIN: CPT | Performed by: PHYSICIAN ASSISTANT

## 2022-12-29 PROCEDURE — 94726 PLETHYSMOGRAPHY LUNG VOLUMES: CPT | Performed by: PHYSICIAN ASSISTANT

## 2022-12-29 PROCEDURE — 80048 BASIC METABOLIC PNL TOTAL CA: CPT | Performed by: PHYSICIAN ASSISTANT

## 2022-12-29 PROCEDURE — 83880 ASSAY OF NATRIURETIC PEPTIDE: CPT | Performed by: PHYSICIAN ASSISTANT

## 2022-12-29 RX ORDER — CARVEDILOL 6.25 MG/1
6.25 TABLET ORAL 2 TIMES DAILY WITH MEALS
Qty: 60 TABLET | Refills: 5 | Status: ON HOLD | OUTPATIENT
Start: 2022-12-29 | End: 2023-02-13

## 2022-12-29 RX ORDER — BUMETANIDE 2 MG/1
2 TABLET ORAL 2 TIMES DAILY
COMMUNITY
End: 2023-01-05

## 2022-12-29 RX ORDER — VERICIGUAT 2.5 MG/1
2.5 TABLET, FILM COATED ORAL DAILY
Qty: 30 TABLET | Refills: 1 | Status: SHIPPED | OUTPATIENT
Start: 2022-12-29 | End: 2023-01-13 | Stop reason: SDUPTHER

## 2022-12-29 RX ORDER — SPIRONOLACTONE 25 MG/1
12.5 TABLET ORAL DAILY
Qty: 30 TABLET | Refills: 11 | Status: ON HOLD | OUTPATIENT
Start: 2022-12-29 | End: 2023-02-13

## 2022-12-29 NOTE — PROGRESS NOTES
Heart Failure Clinic    Date: 12/29/22     Vitals:    12/29/22 1338   BP: (!) 86/56   Pulse: 50   SpO2: 94%        Method of arrival: Other power chair    Weighing self daily: No    Monitoring Heart Failure Zones: Most days    Today's HF Zone: Yellow     Taking medications as prescribed: Yes    Edema Yes, improved    Shortness of Air: No    Number of pillows used at night:Recliner    Educational Materials given:  avs                                                                         ReDS Value: 38  36-41 Possible Hypervolemic Status      Andrew Good RN 12/29/22 13:42 EST
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
## 2023-01-03 ENCOUNTER — READMISSION MANAGEMENT (OUTPATIENT)
Dept: CALL CENTER | Facility: HOSPITAL | Age: 48
End: 2023-01-03
Payer: MEDICARE

## 2023-01-03 NOTE — OUTREACH NOTE
Medical Week 3 Survey    Flowsheet Row Responses   [facility] patient discharged from? [facility]   Does the patient have one of the following disease processes/diagnoses(primary or secondary)? Other   Week 3 attempt successful? No   Unsuccessful attempts Attempt 1          AJ CHAVEZ - Registered Nurse

## 2023-01-05 ENCOUNTER — READMISSION MANAGEMENT (OUTPATIENT)
Dept: CALL CENTER | Facility: HOSPITAL | Age: 48
End: 2023-01-05
Payer: MEDICARE

## 2023-01-05 RX ORDER — BUMETANIDE 2 MG/1
TABLET ORAL
Qty: 60 TABLET | Refills: 0 | Status: SHIPPED | OUTPATIENT
Start: 2023-01-05 | End: 2023-02-02

## 2023-01-05 NOTE — OUTREACH NOTE
Medical Week 3 Survey    Flowsheet Row Responses   [facility] patient discharged from? [facility]   Does the patient have one of the following disease processes/diagnoses(primary or secondary)? Other   Week 3 attempt successful? Yes   Call start time 1514   Call end time 1520   Discharge diagnosis Hyperglycemia due to diabetes mellitus   Person spoke with today (if not patient) and relationship Pineda-brother   Meds reviewed with patient/caregiver? Yes   Is the patient having any side effects they believe may be caused by any medication additions or changes? No   Is the patient taking all medications as directed (includes completed medication regime)? Yes   Medication comments Insulin meds have been increased since f/u appts   Comments regarding appointments wound care appt monthly-1/25, heart failure clinic appt on 1/25   Does the patient have a primary care provider?  Yes   Does the patient have an appointment with their PCP within 7 days of discharge? Yes   Has the patient kept scheduled appointments due by today? Yes   Has home health visited the patient within 72 hours of discharge? N/A   DME comments pt uses oxygen at night   Psychosocial issues? No   What is the patient's perception of their health status since discharge? Same  [RLL swelling and in abdomen, BS have ranged 220-350]   Is the patient/caregiver able to teach back the hierarchy of who to call/visit for symptoms/problems? PCP, Specialist, Home health nurse, Urgent Care, ED, 911 Yes   Week 3 Call Completed? Yes          LILIYA DALE - Registered Nurse

## 2023-01-13 RX ORDER — VERICIGUAT 2.5 MG/1
2.5 TABLET, FILM COATED ORAL DAILY
Qty: 30 TABLET | Refills: 1 | Status: SHIPPED | OUTPATIENT
Start: 2023-01-13 | End: 2023-02-10 | Stop reason: SDUPTHER

## 2023-01-13 NOTE — TELEPHONE ENCOUNTER
Received call from home health nursing stating patient needed more Verquevo. Prescription sent to his pharmacy.

## 2023-01-17 ENCOUNTER — READMISSION MANAGEMENT (OUTPATIENT)
Dept: CALL CENTER | Facility: HOSPITAL | Age: 48
End: 2023-01-17
Payer: MEDICARE

## 2023-01-17 NOTE — OUTREACH NOTE
Medical Week 4 Survey    Flowsheet Row Responses   [facility] facility patient discharged from? [facility]   Does the patient have one of the following disease processes/diagnoses(primary or secondary)? Other   Week 4 attempt successful? No          ALEXANDRA LIMA - Registered Nurse

## 2023-01-25 ENCOUNTER — HOSPITAL ENCOUNTER (OUTPATIENT)
Dept: WOUND CARE | Facility: HOSPITAL | Age: 48
Discharge: HOME | End: 2023-01-25
Admitting: NURSE PRACTITIONER
Payer: MEDICARE

## 2023-01-25 VITALS
DIASTOLIC BLOOD PRESSURE: 68 MMHG | TEMPERATURE: 98.8 F | SYSTOLIC BLOOD PRESSURE: 118 MMHG | RESPIRATION RATE: 18 BRPM | HEART RATE: 83 BPM

## 2023-01-25 DIAGNOSIS — L89.002 PRESSURE INJURY OF ELBOW, STAGE 2, UNSPECIFIED LATERALITY: Primary | ICD-10-CM

## 2023-01-25 DIAGNOSIS — L89.310 PRESSURE INJURY OF RIGHT BUTTOCK, UNSTAGEABLE: ICD-10-CM

## 2023-01-25 PROCEDURE — 63710000001: Performed by: NURSE PRACTITIONER

## 2023-01-25 PROCEDURE — 97602 WOUND(S) CARE NON-SELECTIVE: CPT

## 2023-01-25 PROCEDURE — 63710000001 ALUMINUM-MAGNESIUM HYDROXIDE-SIMETHICONE 200-200-20 MG/5ML SUSPENSION: Performed by: NURSE PRACTITIONER

## 2023-01-25 PROCEDURE — A9270 NON-COVERED ITEM OR SERVICE: HCPCS | Performed by: NURSE PRACTITIONER

## 2023-01-25 PROCEDURE — 63710000001 ZINC OXIDE 20 % OINTMENT: Performed by: NURSE PRACTITIONER

## 2023-01-25 PROCEDURE — 63710000001 CLOTRIMAZOLE 1 % CREAM: Performed by: NURSE PRACTITIONER

## 2023-01-25 RX ORDER — LIDOCAINE HYDROCHLORIDE 20 MG/ML
JELLY TOPICAL AS NEEDED
Status: CANCELLED
Start: 2023-01-25

## 2023-01-25 RX ORDER — SODIUM HYPOCHLORITE 1.25 MG/ML
1 SOLUTION TOPICAL AS NEEDED
OUTPATIENT
Start: 2023-01-25

## 2023-01-25 RX ORDER — SODIUM HYPOCHLORITE 2.5 MG/ML
1 SOLUTION TOPICAL AS NEEDED
OUTPATIENT
Start: 2023-01-25

## 2023-01-25 RX ORDER — CASTOR OIL AND BALSAM, PERU 788; 87 MG/G; MG/G
1 OINTMENT TOPICAL AS NEEDED
OUTPATIENT
Start: 2023-01-25

## 2023-01-25 RX ORDER — LIDOCAINE HYDROCHLORIDE 20 MG/ML
JELLY TOPICAL AS NEEDED
OUTPATIENT
Start: 2023-01-25

## 2023-01-25 RX ORDER — LIDOCAINE HYDROCHLORIDE 20 MG/ML
JELLY TOPICAL AS NEEDED
Status: DISCONTINUED | OUTPATIENT
Start: 2023-01-25 | End: 2023-01-26 | Stop reason: HOSPADM

## 2023-01-25 RX ADMIN — LIDOCAINE HYDROCHLORIDE: 20 JELLY TOPICAL at 14:56

## 2023-01-26 ENCOUNTER — HOSPITAL ENCOUNTER (OUTPATIENT)
Dept: CARDIOLOGY | Facility: HOSPITAL | Age: 48
Discharge: HOME | End: 2023-01-26
Admitting: PHYSICIAN ASSISTANT
Payer: MEDICARE

## 2023-01-26 VITALS — SYSTOLIC BLOOD PRESSURE: 99 MMHG | DIASTOLIC BLOOD PRESSURE: 64 MMHG | HEART RATE: 86 BPM | OXYGEN SATURATION: 96 %

## 2023-01-26 DIAGNOSIS — I42.0 CARDIOMYOPATHY, DILATED: ICD-10-CM

## 2023-01-26 DIAGNOSIS — Z79.01 CHRONIC ANTICOAGULATION: ICD-10-CM

## 2023-01-26 DIAGNOSIS — I50.22 CHRONIC HFREF (HEART FAILURE WITH REDUCED EJECTION FRACTION): Primary | ICD-10-CM

## 2023-01-26 DIAGNOSIS — R73.9 HYPERGLYCEMIA: ICD-10-CM

## 2023-01-26 LAB
ANION GAP SERPL CALCULATED.3IONS-SCNC: 11.3 MMOL/L (ref 5–15)
BASOPHILS # BLD AUTO: 0.02 10*3/MM3 (ref 0–0.2)
BASOPHILS NFR BLD AUTO: 0.3 % (ref 0–1.5)
BUN SERPL-MCNC: 13 MG/DL (ref 6–20)
BUN/CREAT SERPL: 13.8 (ref 7–25)
CALCIUM SPEC-SCNC: 8.7 MG/DL (ref 8.6–10.5)
CHLORIDE SERPL-SCNC: 95 MMOL/L (ref 98–107)
CO2 SERPL-SCNC: 27.7 MMOL/L (ref 22–29)
CREAT SERPL-MCNC: 0.94 MG/DL (ref 0.76–1.27)
DEPRECATED RDW RBC AUTO: 49.2 FL (ref 37–54)
EGFRCR SERPLBLD CKD-EPI 2021: 101.9 ML/MIN/1.73
EOSINOPHIL # BLD AUTO: 0.1 10*3/MM3 (ref 0–0.4)
EOSINOPHIL NFR BLD AUTO: 1.4 % (ref 0.3–6.2)
ERYTHROCYTE [DISTWIDTH] IN BLOOD BY AUTOMATED COUNT: 14.7 % (ref 12.3–15.4)
GLUCOSE SERPL-MCNC: 285 MG/DL (ref 65–99)
HCT VFR BLD AUTO: 37.2 % (ref 37.5–51)
HGB BLD-MCNC: 11.3 G/DL (ref 13–17.7)
IMM GRANULOCYTES # BLD AUTO: 0.03 10*3/MM3 (ref 0–0.05)
IMM GRANULOCYTES NFR BLD AUTO: 0.4 % (ref 0–0.5)
LYMPHOCYTES # BLD AUTO: 1.33 10*3/MM3 (ref 0.7–3.1)
LYMPHOCYTES NFR BLD AUTO: 18.7 % (ref 19.6–45.3)
MAGNESIUM SERPL-MCNC: 1.5 MG/DL (ref 1.6–2.6)
MCH RBC QN AUTO: 27.7 PG (ref 26.6–33)
MCHC RBC AUTO-ENTMCNC: 30.4 G/DL (ref 31.5–35.7)
MCV RBC AUTO: 91.2 FL (ref 79–97)
MONOCYTES # BLD AUTO: 0.23 10*3/MM3 (ref 0.1–0.9)
MONOCYTES NFR BLD AUTO: 3.2 % (ref 5–12)
NEUTROPHILS NFR BLD AUTO: 5.39 10*3/MM3 (ref 1.7–7)
NEUTROPHILS NFR BLD AUTO: 76 % (ref 42.7–76)
NRBC BLD AUTO-RTO: 0 /100 WBC (ref 0–0.2)
NT-PROBNP SERPL-MCNC: 1445 PG/ML (ref 0–450)
PLATELET # BLD AUTO: 273 10*3/MM3 (ref 140–450)
PMV BLD AUTO: 10 FL (ref 6–12)
POTASSIUM SERPL-SCNC: 3.9 MMOL/L (ref 3.5–5.2)
RBC # BLD AUTO: 4.08 10*6/MM3 (ref 4.14–5.8)
SODIUM SERPL-SCNC: 134 MMOL/L (ref 136–145)
WBC NRBC COR # BLD: 7.1 10*3/MM3 (ref 3.4–10.8)

## 2023-01-26 PROCEDURE — 85025 COMPLETE CBC W/AUTO DIFF WBC: CPT | Performed by: PHYSICIAN ASSISTANT

## 2023-01-26 PROCEDURE — 99215 OFFICE O/P EST HI 40 MIN: CPT | Performed by: PHYSICIAN ASSISTANT

## 2023-01-26 PROCEDURE — 83880 ASSAY OF NATRIURETIC PEPTIDE: CPT | Performed by: PHYSICIAN ASSISTANT

## 2023-01-26 PROCEDURE — 83735 ASSAY OF MAGNESIUM: CPT | Performed by: PHYSICIAN ASSISTANT

## 2023-01-26 PROCEDURE — 80048 BASIC METABOLIC PNL TOTAL CA: CPT | Performed by: PHYSICIAN ASSISTANT

## 2023-01-26 RX ORDER — AMOXICILLIN 500 MG/1
500 CAPSULE ORAL 3 TIMES DAILY
Status: ON HOLD | COMMUNITY
Start: 2023-01-18 | End: 2023-02-13

## 2023-01-26 RX ORDER — METOLAZONE 2.5 MG/1
2.5 TABLET ORAL 3 TIMES WEEKLY
Qty: 24 TABLET | Refills: 0 | Status: SHIPPED | OUTPATIENT
Start: 2023-01-26 | End: 2023-02-10 | Stop reason: SDUPTHER

## 2023-01-26 RX ORDER — TIZANIDINE 2 MG/1
2 TABLET ORAL 3 TIMES DAILY
Status: ON HOLD | COMMUNITY
Start: 2023-01-24 | End: 2023-02-13
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
## 2023-01-26 NOTE — PROGRESS NOTES
Heart Failure Clinic    Date: 01/26/23     Vitals:    01/26/23 1444   BP: 99/64   Pulse: 86   SpO2: 96%        Method of arrival: Other power chair    Weighing self daily: No    Monitoring Heart Failure Zones: Yes    Today's HF Zone: Yellow     Taking medications as prescribed: Yes    Edema Yes    Shortness of Air: No    Number of pillows used at night: hospital bed    Educational Materials given:  [facility] x3 attempts      Andrew Good RN 01/26/23 14:47 EST
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
## 2023-01-31 RX ORDER — INSULIN DETEMIR 100 [IU]/ML
33 INJECTION, SOLUTION SUBCUTANEOUS 2 TIMES DAILY
Qty: 60 ML | Refills: 0 | Status: ON HOLD | OUTPATIENT
Start: 2023-01-31 | End: 2023-04-04 | Stop reason: SDUPTHER
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
## 2023-02-02 ENCOUNTER — HOSPITAL ENCOUNTER (OUTPATIENT)
Dept: WOUND CARE | Facility: HOSPITAL | Age: 48
Discharge: HOME | End: 2023-02-02
Admitting: NURSE PRACTITIONER
Payer: MEDICARE

## 2023-02-02 VITALS
SYSTOLIC BLOOD PRESSURE: 117 MMHG | RESPIRATION RATE: 17 BRPM | HEART RATE: 68 BPM | DIASTOLIC BLOOD PRESSURE: 84 MMHG | TEMPERATURE: 97.8 F

## 2023-02-02 DIAGNOSIS — L89.310 PRESSURE INJURY OF RIGHT BUTTOCK, UNSTAGEABLE: ICD-10-CM

## 2023-02-02 DIAGNOSIS — L89.002 PRESSURE INJURY OF ELBOW, STAGE 2, UNSPECIFIED LATERALITY: Primary | ICD-10-CM

## 2023-02-02 PROCEDURE — 63710000001 ALUMINUM-MAGNESIUM HYDROXIDE-SIMETHICONE 200-200-20 MG/5ML SUSPENSION: Performed by: NURSE PRACTITIONER

## 2023-02-02 PROCEDURE — A9270 NON-COVERED ITEM OR SERVICE: HCPCS | Performed by: NURSE PRACTITIONER

## 2023-02-02 PROCEDURE — 63710000001 ZINC OXIDE 20 % OINTMENT: Performed by: NURSE PRACTITIONER

## 2023-02-02 PROCEDURE — 63710000001 COLLAGENASE 250 UNIT/GM OINTMENT 30 G TUBE: Performed by: NURSE PRACTITIONER

## 2023-02-02 PROCEDURE — 63710000001: Performed by: NURSE PRACTITIONER

## 2023-02-02 PROCEDURE — 63710000001 CLOTRIMAZOLE 1 % CREAM: Performed by: NURSE PRACTITIONER

## 2023-02-02 RX ORDER — BUMETANIDE 2 MG/1
TABLET ORAL
Qty: 60 TABLET | Refills: 0 | Status: ON HOLD | OUTPATIENT
Start: 2023-02-02 | End: 2023-02-21

## 2023-02-02 RX ORDER — LIDOCAINE HYDROCHLORIDE 20 MG/ML
JELLY TOPICAL AS NEEDED
Start: 2023-02-02

## 2023-02-02 RX ORDER — SODIUM HYPOCHLORITE 1.25 MG/ML
1 SOLUTION TOPICAL AS NEEDED
OUTPATIENT
Start: 2023-02-02

## 2023-02-02 RX ORDER — LIDOCAINE HYDROCHLORIDE 20 MG/ML
JELLY TOPICAL AS NEEDED
OUTPATIENT
Start: 2023-02-02

## 2023-02-02 RX ORDER — CASTOR OIL AND BALSAM, PERU 788; 87 MG/G; MG/G
1 OINTMENT TOPICAL AS NEEDED
OUTPATIENT
Start: 2023-02-02

## 2023-02-02 RX ORDER — SODIUM HYPOCHLORITE 2.5 MG/ML
1 SOLUTION TOPICAL AS NEEDED
OUTPATIENT
Start: 2023-02-02

## 2023-02-02 RX ORDER — LIDOCAINE HYDROCHLORIDE 20 MG/ML
JELLY TOPICAL AS NEEDED
Status: DISCONTINUED | OUTPATIENT
Start: 2023-02-02 | End: 2023-02-03 | Stop reason: HOSPADM

## 2023-02-02 RX ADMIN — COLLAGENASE SANTYL 1 APPLICATION: 250 OINTMENT TOPICAL at 13:55

## 2023-02-02 RX ADMIN — LIDOCAINE HYDROCHLORIDE 6 ML: 20 JELLY TOPICAL at 13:55

## 2023-02-02 NOTE — PROGRESS NOTES
Wound Clinic Progress Note  Patient Identification:  Name:  Ken Krueger  Age:  47 y.o.  Sex:  male  :  1975  MRN:  [id number]   Visit Number:  [id number]  Primary Care Physician:  Franchesca Hodges APRN     Subjective     Chief complaint:     Pressure injury     History of presenting illness:   23  Multiple pressure injuries. Wound right upper gluteal with nonviable tissue. Indication for sharp debridement. No acute issues today. Tolerating treatments.    Assessment:   Stage 4 PI right upper gluteal area    Wound Care Procedure Note   Pre-Procedure  Pre-Procedure Diagnosis: Unstageable Pressure wound right upper gluteal   Consent:Consent obtained, consent given by patient,Risks Discussed with Patient and Family, Alternatives Discussed, Yes  Time out was called prior to procedure.   Pre procedure Pain assessment: None  Pre debridement measurements: Right upper gluteal: 6.1x 3.6x 2.6 cm     Post Procedure  Post-Procedure Diagnosis: same  Post debridement measurements: Right upper gluteal: 6.2 X 3.7 X 2.8 cm  Post procedure Pain assessment: None  Graft/Implant/Prosthetics/Implanted Device/Transplants:  None  Complication(s):  None     Procedure details:  Method of Debridement: excissional (Surgical removal or cutting away, outside or beyond the wound margin devitalized tissue, necrosis or slough.)  Instrument(s) used: curette, scissors, forceps  Type of Anesthetic: Lidocaine topical, patient paraplegic  Tissue removed: subuctaneous, Percent Removed 100%  Culture or Biopsy: None  Estimated Blood Loss: <1cc  Controlled blood loss: pressure    Follow up in 1 week. To ER prior if acute issues arise.    Bryce Arambula PA-C

## 2023-02-08 ENCOUNTER — HOSPITAL ENCOUNTER (OUTPATIENT)
Dept: CARDIOLOGY | Facility: HOSPITAL | Age: 48
Discharge: HOME | End: 2023-02-08
Admitting: PHYSICIAN ASSISTANT
Payer: MEDICARE

## 2023-02-08 DIAGNOSIS — I50.22 CHRONIC HFREF (HEART FAILURE WITH REDUCED EJECTION FRACTION): ICD-10-CM

## 2023-02-08 LAB
BH CV ECHO MEAS - AO MAX PG: 5.1 MMHG
BH CV ECHO MEAS - AO MEAN PG: 3 MMHG
BH CV ECHO MEAS - AO ROOT DIAM: 2.8 CM
BH CV ECHO MEAS - AO V2 MAX: 113 CM/SEC
BH CV ECHO MEAS - AO V2 VTI: 16 CM
BH CV ECHO MEAS - EDV(CUBED): 238.3 ML
BH CV ECHO MEAS - ESV(CUBED): 140.6 ML
BH CV ECHO MEAS - FS: 16.1 %
BH CV ECHO MEAS - IVS/LVPW: 1 CM
BH CV ECHO MEAS - IVSD: 1.5 CM
BH CV ECHO MEAS - LA DIMENSION: 3.9 CM
BH CV ECHO MEAS - LV MASS(C)D: 450.2 GRAMS
BH CV ECHO MEAS - LVIDD: 6.2 CM
BH CV ECHO MEAS - LVIDS: 5.2 CM
BH CV ECHO MEAS - LVOT AREA: 2.8 CM2
BH CV ECHO MEAS - LVOT DIAM: 1.9 CM
BH CV ECHO MEAS - LVPWD: 1.5 CM
BH CV ECHO MEAS - MV A MAX VEL: 44.9 CM/SEC
BH CV ECHO MEAS - MV E MAX VEL: 99.5 CM/SEC
BH CV ECHO MEAS - MV E/A: 2.22
BH CV ECHO MEAS - PA ACC TIME: 0.08 SEC
BH CV ECHO MEAS - PA PR(ACCEL): 44.4 MMHG
MAXIMAL PREDICTED HEART RATE: 175 BPM
STRESS TARGET HR: 149 BPM

## 2023-02-08 PROCEDURE — 93306 TTE W/DOPPLER COMPLETE: CPT | Performed by: SPECIALIST

## 2023-02-08 PROCEDURE — 93306 TTE W/DOPPLER COMPLETE: CPT

## 2023-02-10 RX ORDER — METOLAZONE 2.5 MG/1
2.5 TABLET ORAL 3 TIMES WEEKLY
Qty: 24 TABLET | Refills: 0 | Status: ON HOLD | OUTPATIENT
Start: 2023-02-10 | End: 2023-04-11

## 2023-02-10 RX ORDER — VERICIGUAT 2.5 MG/1
2.5 TABLET, FILM COATED ORAL DAILY
Qty: 30 TABLET | Refills: 1 | Status: ON HOLD | OUTPATIENT
Start: 2023-02-10 | End: 2023-04-04

## 2023-02-12 ENCOUNTER — APPOINTMENT (OUTPATIENT)
Dept: CT IMAGING | Facility: HOSPITAL | Age: 48
DRG: 853 | End: 2023-02-12
Payer: MEDICARE

## 2023-02-12 ENCOUNTER — HOSPITAL ENCOUNTER (INPATIENT)
Facility: HOSPITAL | Age: 48
LOS: 9 days | Discharge: NURSING, REHAB OR LONG-TERM CARE | DRG: 853 | End: 2023-02-21
Attending: STUDENT IN AN ORGANIZED HEALTH CARE EDUCATION/TRAINING PROGRAM | Admitting: INTERNAL MEDICINE
Payer: MEDICARE

## 2023-02-12 DIAGNOSIS — M86.00 ACUTE HEMATOGENOUS OSTEOMYELITIS, UNSPECIFIED SITE: ICD-10-CM

## 2023-02-12 DIAGNOSIS — M86.08 ACUTE HEMATOGENOUS OSTEOMYELITIS OF OTHER SITE: ICD-10-CM

## 2023-02-12 DIAGNOSIS — L89.154 PRESSURE INJURY OF SACRAL REGION, STAGE 4: Primary | ICD-10-CM

## 2023-02-12 PROBLEM — M86.9 OSTEOMYELITIS: Status: ACTIVE | Noted: 2023-02-12

## 2023-02-12 LAB
ACETONE BLD QL: NEGATIVE
ALBUMIN SERPL-MCNC: 3.6 G/DL (ref 3.5–5.2)
ALBUMIN/GLOB SERPL: 0.7 G/DL
ALP SERPL-CCNC: 123 U/L (ref 39–117)
ALT SERPL W P-5'-P-CCNC: 42 U/L (ref 1–41)
ANION GAP SERPL CALCULATED.3IONS-SCNC: 15.4 MMOL/L (ref 5–15)
AST SERPL-CCNC: 48 U/L (ref 1–40)
ATMOSPHERIC PRESS: 726 MMHG
BASE EXCESS BLDV CALC-SCNC: 3.2 MMOL/L (ref 0–2)
BASOPHILS # BLD AUTO: 0.04 10*3/MM3 (ref 0–0.2)
BASOPHILS NFR BLD AUTO: 0.3 % (ref 0–1.5)
BDY SITE: ABNORMAL
BILIRUB SERPL-MCNC: 0.2 MG/DL (ref 0–1.2)
BODY TEMPERATURE: 37 C
BUN SERPL-MCNC: 30 MG/DL (ref 6–20)
BUN/CREAT SERPL: 22.4 (ref 7–25)
CALCIUM SPEC-SCNC: 9.5 MG/DL (ref 8.6–10.5)
CHLORIDE SERPL-SCNC: 87 MMOL/L (ref 98–107)
CO2 BLDA-SCNC: 30.2 MMOL/L (ref 22–33)
CO2 SERPL-SCNC: 28.6 MMOL/L (ref 22–29)
COHGB MFR BLD: 1.4 % (ref 0–5)
CREAT SERPL-MCNC: 1.34 MG/DL (ref 0.76–1.27)
CRP SERPL-MCNC: 9.14 MG/DL (ref 0–0.5)
D-LACTATE SERPL-SCNC: 4.9 MMOL/L (ref 0.5–2)
DEPRECATED RDW RBC AUTO: 44 FL (ref 37–54)
EGFRCR SERPLBLD CKD-EPI 2021: 66.6 ML/MIN/1.73
EOSINOPHIL # BLD AUTO: 0.21 10*3/MM3 (ref 0–0.4)
EOSINOPHIL NFR BLD AUTO: 1.6 % (ref 0.3–6.2)
ERYTHROCYTE [DISTWIDTH] IN BLOOD BY AUTOMATED COUNT: 13.6 % (ref 12.3–15.4)
ERYTHROCYTE [SEDIMENTATION RATE] IN BLOOD: >130 MM/HR (ref 0–15)
FLUAV RNA RESP QL NAA+PROBE: NOT DETECTED
FLUBV RNA ISLT QL NAA+PROBE: NOT DETECTED
GLOBULIN UR ELPH-MCNC: 5 GM/DL
GLUCOSE SERPL-MCNC: 519 MG/DL (ref 65–99)
HCO3 BLDV-SCNC: 28.8 MMOL/L (ref 22–28)
HCT VFR BLD AUTO: 41.4 % (ref 37.5–51)
HGB BLD-MCNC: 12.6 G/DL (ref 13–17.7)
HGB BLDA-MCNC: 13.4 G/DL (ref 14–18)
IMM GRANULOCYTES # BLD AUTO: 0.08 10*3/MM3 (ref 0–0.05)
IMM GRANULOCYTES NFR BLD AUTO: 0.6 % (ref 0–0.5)
INHALED O2 CONCENTRATION: 21 %
LYMPHOCYTES # BLD AUTO: 2 10*3/MM3 (ref 0.7–3.1)
LYMPHOCYTES NFR BLD AUTO: 15.7 % (ref 19.6–45.3)
Lab: ABNORMAL
MCH RBC QN AUTO: 26.8 PG (ref 26.6–33)
MCHC RBC AUTO-ENTMCNC: 30.4 G/DL (ref 31.5–35.7)
MCV RBC AUTO: 87.9 FL (ref 79–97)
METHGB BLD QL: 0 % (ref 0–3)
MODALITY: ABNORMAL
MONOCYTES # BLD AUTO: 0.33 10*3/MM3 (ref 0.1–0.9)
MONOCYTES NFR BLD AUTO: 2.6 % (ref 5–12)
NEUTROPHILS NFR BLD AUTO: 10.09 10*3/MM3 (ref 1.7–7)
NEUTROPHILS NFR BLD AUTO: 79.2 % (ref 42.7–76)
NRBC BLD AUTO-RTO: 0 /100 WBC (ref 0–0.2)
OXYHGB MFR BLDV: 68.2 % (ref 45–75)
PCO2 BLDV: 46.9 MM HG (ref 41–51)
PH BLDV: 7.4 PH UNITS (ref 7.32–7.42)
PLATELET # BLD AUTO: 439 10*3/MM3 (ref 140–450)
PMV BLD AUTO: 9.4 FL (ref 6–12)
PO2 BLDV: 38.1 MM HG (ref 27–53)
POTASSIUM SERPL-SCNC: 3.3 MMOL/L (ref 3.5–5.2)
PROT SERPL-MCNC: 8.6 G/DL (ref 6–8.5)
RBC # BLD AUTO: 4.71 10*6/MM3 (ref 4.14–5.8)
SAO2 % BLDCOV: 69.1 % (ref 45–75)
SARS-COV-2 RNA RESP QL NAA+PROBE: NOT DETECTED
SODIUM SERPL-SCNC: 131 MMOL/L (ref 136–145)
VENTILATOR MODE: ABNORMAL
WBC NRBC COR # BLD: 12.75 10*3/MM3 (ref 3.4–10.8)

## 2023-02-12 PROCEDURE — 87636 SARSCOV2 & INF A&B AMP PRB: CPT | Performed by: STUDENT IN AN ORGANIZED HEALTH CARE EDUCATION/TRAINING PROGRAM

## 2023-02-12 PROCEDURE — 82009 KETONE BODYS QUAL: CPT | Performed by: STUDENT IN AN ORGANIZED HEALTH CARE EDUCATION/TRAINING PROGRAM

## 2023-02-12 PROCEDURE — 74177 CT ABD & PELVIS W/CONTRAST: CPT

## 2023-02-12 PROCEDURE — 36415 COLL VENOUS BLD VENIPUNCTURE: CPT

## 2023-02-12 PROCEDURE — 83605 ASSAY OF LACTIC ACID: CPT | Performed by: STUDENT IN AN ORGANIZED HEALTH CARE EDUCATION/TRAINING PROGRAM

## 2023-02-12 PROCEDURE — 99223 1ST HOSP IP/OBS HIGH 75: CPT | Performed by: INTERNAL MEDICINE

## 2023-02-12 PROCEDURE — 63710000001 INSULIN REGULAR HUMAN PER 5 UNITS: Performed by: STUDENT IN AN ORGANIZED HEALTH CARE EDUCATION/TRAINING PROGRAM

## 2023-02-12 PROCEDURE — 82805 BLOOD GASES W/O2 SATURATION: CPT

## 2023-02-12 PROCEDURE — 86140 C-REACTIVE PROTEIN: CPT | Performed by: STUDENT IN AN ORGANIZED HEALTH CARE EDUCATION/TRAINING PROGRAM

## 2023-02-12 PROCEDURE — 80053 COMPREHEN METABOLIC PANEL: CPT | Performed by: STUDENT IN AN ORGANIZED HEALTH CARE EDUCATION/TRAINING PROGRAM

## 2023-02-12 PROCEDURE — 85652 RBC SED RATE AUTOMATED: CPT | Performed by: STUDENT IN AN ORGANIZED HEALTH CARE EDUCATION/TRAINING PROGRAM

## 2023-02-12 PROCEDURE — 99284 EMERGENCY DEPT VISIT MOD MDM: CPT

## 2023-02-12 PROCEDURE — 85025 COMPLETE CBC W/AUTO DIFF WBC: CPT | Performed by: STUDENT IN AN ORGANIZED HEALTH CARE EDUCATION/TRAINING PROGRAM

## 2023-02-12 PROCEDURE — 82820 HEMOGLOBIN-OXYGEN AFFINITY: CPT

## 2023-02-12 PROCEDURE — 87040 BLOOD CULTURE FOR BACTERIA: CPT | Performed by: STUDENT IN AN ORGANIZED HEALTH CARE EDUCATION/TRAINING PROGRAM

## 2023-02-12 PROCEDURE — 25010000002 IOPAMIDOL 61 % SOLUTION: Performed by: STUDENT IN AN ORGANIZED HEALTH CARE EDUCATION/TRAINING PROGRAM

## 2023-02-12 PROCEDURE — 25010000002 PIPERACILLIN SOD-TAZOBACTAM PER 1 G: Performed by: STUDENT IN AN ORGANIZED HEALTH CARE EDUCATION/TRAINING PROGRAM

## 2023-02-12 RX ADMIN — HUMAN INSULIN 15 UNITS: 100 INJECTION, SOLUTION SUBCUTANEOUS at 21:48

## 2023-02-12 RX ADMIN — PIPERACILLIN SODIUM AND TAZOBACTAM SODIUM 3.38 G: 3; .375 INJECTION, POWDER, LYOPHILIZED, FOR SOLUTION INTRAVENOUS at 23:36

## 2023-02-12 RX ADMIN — SODIUM CHLORIDE 1000 ML: 9 INJECTION, SOLUTION INTRAVENOUS at 21:44

## 2023-02-12 RX ADMIN — IOPAMIDOL 80 ML: 612 INJECTION, SOLUTION INTRAVENOUS at 22:13

## 2023-02-13 ENCOUNTER — ANESTHESIA EVENT (OUTPATIENT)
Dept: PERIOP | Facility: HOSPITAL | Age: 48
DRG: 853 | End: 2023-02-13
Payer: MEDICARE

## 2023-02-13 ENCOUNTER — ANESTHESIA (OUTPATIENT)
Dept: PERIOP | Facility: HOSPITAL | Age: 48
DRG: 853 | End: 2023-02-13
Payer: MEDICARE

## 2023-02-13 LAB
ALBUMIN SERPL-MCNC: 3.1 G/DL (ref 3.5–5.2)
ALBUMIN/GLOB SERPL: 0.8 G/DL
ALP SERPL-CCNC: 108 U/L (ref 39–117)
ALT SERPL W P-5'-P-CCNC: 33 U/L (ref 1–41)
ANION GAP SERPL CALCULATED.3IONS-SCNC: 16.7 MMOL/L (ref 5–15)
AST SERPL-CCNC: 36 U/L (ref 1–40)
BASOPHILS # BLD AUTO: 0.04 10*3/MM3 (ref 0–0.2)
BASOPHILS NFR BLD AUTO: 0.3 % (ref 0–1.5)
BILIRUB SERPL-MCNC: 0.3 MG/DL (ref 0–1.2)
BUN SERPL-MCNC: 31 MG/DL (ref 6–20)
BUN/CREAT SERPL: 26.1 (ref 7–25)
CALCIUM SPEC-SCNC: 8.4 MG/DL (ref 8.6–10.5)
CHLORIDE SERPL-SCNC: 93 MMOL/L (ref 98–107)
CO2 SERPL-SCNC: 23.3 MMOL/L (ref 22–29)
CREAT SERPL-MCNC: 1.19 MG/DL (ref 0.76–1.27)
D-LACTATE SERPL-SCNC: 1.6 MMOL/L (ref 0.5–2)
D-LACTATE SERPL-SCNC: 2.2 MMOL/L (ref 0.5–2)
D-LACTATE SERPL-SCNC: 2.6 MMOL/L (ref 0.5–2)
D-LACTATE SERPL-SCNC: 2.6 MMOL/L (ref 0.5–2)
D-LACTATE SERPL-SCNC: 3.2 MMOL/L (ref 0.5–2)
DEPRECATED RDW RBC AUTO: 43.6 FL (ref 37–54)
EGFRCR SERPLBLD CKD-EPI 2021: 76.8 ML/MIN/1.73
EOSINOPHIL # BLD AUTO: 0.2 10*3/MM3 (ref 0–0.4)
EOSINOPHIL NFR BLD AUTO: 1.5 % (ref 0.3–6.2)
ERYTHROCYTE [DISTWIDTH] IN BLOOD BY AUTOMATED COUNT: 13.7 % (ref 12.3–15.4)
GLOBULIN UR ELPH-MCNC: 3.8 GM/DL
GLUCOSE BLDC GLUCOMTR-MCNC: 333 MG/DL (ref 70–130)
GLUCOSE BLDC GLUCOMTR-MCNC: 352 MG/DL (ref 70–130)
GLUCOSE BLDC GLUCOMTR-MCNC: 368 MG/DL (ref 70–130)
GLUCOSE BLDC GLUCOMTR-MCNC: 375 MG/DL (ref 70–130)
GLUCOSE BLDC GLUCOMTR-MCNC: 397 MG/DL (ref 70–130)
GLUCOSE BLDC GLUCOMTR-MCNC: 417 MG/DL (ref 70–130)
GLUCOSE SERPL-MCNC: 398 MG/DL (ref 65–99)
HBA1C MFR BLD: 11.5 % (ref 4.8–5.6)
HCT VFR BLD AUTO: 37.6 % (ref 37.5–51)
HGB BLD-MCNC: 11.4 G/DL (ref 13–17.7)
IMM GRANULOCYTES # BLD AUTO: 0.06 10*3/MM3 (ref 0–0.05)
IMM GRANULOCYTES NFR BLD AUTO: 0.4 % (ref 0–0.5)
LYMPHOCYTES # BLD AUTO: 1.45 10*3/MM3 (ref 0.7–3.1)
LYMPHOCYTES NFR BLD AUTO: 10.9 % (ref 19.6–45.3)
MAGNESIUM SERPL-MCNC: 1.6 MG/DL (ref 1.6–2.6)
MCH RBC QN AUTO: 26.2 PG (ref 26.6–33)
MCHC RBC AUTO-ENTMCNC: 30.3 G/DL (ref 31.5–35.7)
MCV RBC AUTO: 86.4 FL (ref 79–97)
MONOCYTES # BLD AUTO: 0.5 10*3/MM3 (ref 0.1–0.9)
MONOCYTES NFR BLD AUTO: 3.7 % (ref 5–12)
NEUTROPHILS NFR BLD AUTO: 11.1 10*3/MM3 (ref 1.7–7)
NEUTROPHILS NFR BLD AUTO: 83.2 % (ref 42.7–76)
NRBC BLD AUTO-RTO: 0 /100 WBC (ref 0–0.2)
PLATELET # BLD AUTO: 322 10*3/MM3 (ref 140–450)
PMV BLD AUTO: 9.6 FL (ref 6–12)
POTASSIUM SERPL-SCNC: 3.3 MMOL/L (ref 3.5–5.2)
PROT SERPL-MCNC: 6.9 G/DL (ref 6–8.5)
RBC # BLD AUTO: 4.35 10*6/MM3 (ref 4.14–5.8)
SODIUM SERPL-SCNC: 133 MMOL/L (ref 136–145)
VANCOMYCIN SERPL-MCNC: 14.2 MCG/ML (ref 5–40)
WBC NRBC COR # BLD: 13.35 10*3/MM3 (ref 3.4–10.8)

## 2023-02-13 PROCEDURE — 25010000002 CEFEPIME PER 500 MG: Performed by: SURGERY

## 2023-02-13 PROCEDURE — 87205 SMEAR GRAM STAIN: CPT | Performed by: SURGERY

## 2023-02-13 PROCEDURE — 80053 COMPREHEN METABOLIC PANEL: CPT | Performed by: INTERNAL MEDICINE

## 2023-02-13 PROCEDURE — 80202 ASSAY OF VANCOMYCIN: CPT | Performed by: SURGERY

## 2023-02-13 PROCEDURE — 25010000002 PIPERACILLIN SOD-TAZOBACTAM PER 1 G: Performed by: INTERNAL MEDICINE

## 2023-02-13 PROCEDURE — 83605 ASSAY OF LACTIC ACID: CPT | Performed by: STUDENT IN AN ORGANIZED HEALTH CARE EDUCATION/TRAINING PROGRAM

## 2023-02-13 PROCEDURE — 25010000002 FENTANYL CITRATE (PF) 50 MCG/ML SOLUTION: Performed by: NURSE ANESTHETIST, CERTIFIED REGISTERED

## 2023-02-13 PROCEDURE — 25010000002 PROPOFOL 10 MG/ML EMULSION: Performed by: NURSE ANESTHETIST, CERTIFIED REGISTERED

## 2023-02-13 PROCEDURE — 63710000001 INSULIN REGULAR HUMAN PER 5 UNITS: Performed by: ANESTHESIOLOGY

## 2023-02-13 PROCEDURE — 87077 CULTURE AEROBIC IDENTIFY: CPT | Performed by: SURGERY

## 2023-02-13 PROCEDURE — 99222 1ST HOSP IP/OBS MODERATE 55: CPT | Performed by: INTERNAL MEDICINE

## 2023-02-13 PROCEDURE — 11043 DBRDMT MUSC&/FSCA 1ST 20/<: CPT | Performed by: SURGERY

## 2023-02-13 PROCEDURE — 87070 CULTURE OTHR SPECIMN AEROBIC: CPT | Performed by: SURGERY

## 2023-02-13 PROCEDURE — 02HV33Z INSERTION OF INFUSION DEVICE INTO SUPERIOR VENA CAVA, PERCUTANEOUS APPROACH: ICD-10-PCS | Performed by: SURGERY

## 2023-02-13 PROCEDURE — 25010000002 VANCOMYCIN 5 G RECONSTITUTED SOLUTION: Performed by: STUDENT IN AN ORGANIZED HEALTH CARE EDUCATION/TRAINING PROGRAM

## 2023-02-13 PROCEDURE — 83735 ASSAY OF MAGNESIUM: CPT | Performed by: INTERNAL MEDICINE

## 2023-02-13 PROCEDURE — C1751 CATH, INF, PER/CENT/MIDLINE: HCPCS

## 2023-02-13 PROCEDURE — 11046 DBRDMT MUSC&/FSCA EA ADDL: CPT | Performed by: SURGERY

## 2023-02-13 PROCEDURE — 63710000001 INSULIN ASPART PER 5 UNITS: Performed by: INTERNAL MEDICINE

## 2023-02-13 PROCEDURE — 99222 1ST HOSP IP/OBS MODERATE 55: CPT | Performed by: SURGERY

## 2023-02-13 PROCEDURE — 87176 TISSUE HOMOGENIZATION CULTR: CPT | Performed by: SURGERY

## 2023-02-13 PROCEDURE — 83036 HEMOGLOBIN GLYCOSYLATED A1C: CPT | Performed by: SURGERY

## 2023-02-13 PROCEDURE — 82962 GLUCOSE BLOOD TEST: CPT

## 2023-02-13 PROCEDURE — 85025 COMPLETE CBC W/AUTO DIFF WBC: CPT | Performed by: INTERNAL MEDICINE

## 2023-02-13 PROCEDURE — 36573 INSJ PICC RS&I 5 YR+: CPT

## 2023-02-13 PROCEDURE — 63710000001 INSULIN ASPART PER 5 UNITS: Performed by: SURGERY

## 2023-02-13 PROCEDURE — 25010000002 VANCOMYCIN 5 G RECONSTITUTED SOLUTION: Performed by: SURGERY

## 2023-02-13 PROCEDURE — 99232 SBSQ HOSP IP/OBS MODERATE 35: CPT | Performed by: HOSPITALIST

## 2023-02-13 PROCEDURE — 25010000002 MIDAZOLAM PER 1 MG: Performed by: NURSE ANESTHETIST, CERTIFIED REGISTERED

## 2023-02-13 PROCEDURE — 0 POTASSIUM CHLORIDE 10 MEQ/100ML SOLUTION: Performed by: INTERNAL MEDICINE

## 2023-02-13 PROCEDURE — 87186 SC STD MICRODIL/AGAR DIL: CPT | Performed by: SURGERY

## 2023-02-13 PROCEDURE — 0KBN0ZZ EXCISION OF RIGHT HIP MUSCLE, OPEN APPROACH: ICD-10-PCS | Performed by: SURGERY

## 2023-02-13 PROCEDURE — 63710000001 INSULIN DETEMIR PER 5 UNITS: Performed by: HOSPITALIST

## 2023-02-13 PROCEDURE — 63710000001 INSULIN DETEMIR PER 5 UNITS: Performed by: INTERNAL MEDICINE

## 2023-02-13 DEVICE — FLOSEAL HEMOSTATIC MATRIX, 5ML
Type: IMPLANTABLE DEVICE | Site: BUTTOCKS | Status: FUNCTIONAL
Brand: FLOSEAL HEMOSTATIC MATRIX

## 2023-02-13 RX ORDER — SPIRONOLACTONE 25 MG/1
25 TABLET ORAL DAILY
Status: DISCONTINUED | OUTPATIENT
Start: 2023-02-13 | End: 2023-02-21 | Stop reason: HOSPADM

## 2023-02-13 RX ORDER — CARVEDILOL 6.25 MG/1
6.25 TABLET ORAL 2 TIMES DAILY WITH MEALS
Status: DISCONTINUED | OUTPATIENT
Start: 2023-02-13 | End: 2023-02-18

## 2023-02-13 RX ORDER — CARVEDILOL 6.25 MG/1
6.25 TABLET ORAL 2 TIMES DAILY WITH MEALS
Status: CANCELLED | OUTPATIENT
Start: 2023-02-13

## 2023-02-13 RX ORDER — ASCORBIC ACID 500 MG
500 TABLET ORAL DAILY
Status: DISCONTINUED | OUTPATIENT
Start: 2023-02-13 | End: 2023-02-21 | Stop reason: HOSPADM

## 2023-02-13 RX ORDER — SODIUM CHLORIDE, SODIUM LACTATE, POTASSIUM CHLORIDE, CALCIUM CHLORIDE 600; 310; 30; 20 MG/100ML; MG/100ML; MG/100ML; MG/100ML
100 INJECTION, SOLUTION INTRAVENOUS ONCE AS NEEDED
Status: DISCONTINUED | OUTPATIENT
Start: 2023-02-13 | End: 2023-02-13 | Stop reason: HOSPADM

## 2023-02-13 RX ORDER — GARLIC EXTRACT 500 MG
1 CAPSULE ORAL DAILY
Status: DISCONTINUED | OUTPATIENT
Start: 2023-02-13 | End: 2023-02-21 | Stop reason: HOSPADM

## 2023-02-13 RX ORDER — POTASSIUM CHLORIDE 7.45 MG/ML
10 INJECTION INTRAVENOUS
Status: DISPENSED | OUTPATIENT
Start: 2023-02-13 | End: 2023-02-13

## 2023-02-13 RX ORDER — MULTIPLE VITAMINS W/ MINERALS TAB 9MG-400MCG
1 TAB ORAL DAILY
Status: DISCONTINUED | OUTPATIENT
Start: 2023-02-13 | End: 2023-02-21 | Stop reason: HOSPADM

## 2023-02-13 RX ORDER — MIDAZOLAM HYDROCHLORIDE 1 MG/ML
1 INJECTION INTRAMUSCULAR; INTRAVENOUS
Status: DISCONTINUED | OUTPATIENT
Start: 2023-02-13 | End: 2023-02-13 | Stop reason: HOSPADM

## 2023-02-13 RX ORDER — FERROUS SULFATE 325(65) MG
325 TABLET ORAL
Status: DISCONTINUED | OUTPATIENT
Start: 2023-02-13 | End: 2023-02-21 | Stop reason: HOSPADM

## 2023-02-13 RX ORDER — POTASSIUM CHLORIDE 1500 MG/1
40 TABLET, FILM COATED, EXTENDED RELEASE ORAL AS NEEDED
Status: DISCONTINUED | OUTPATIENT
Start: 2023-02-13 | End: 2023-02-21 | Stop reason: HOSPADM

## 2023-02-13 RX ORDER — DEXTROSE MONOHYDRATE 25 G/50ML
25 INJECTION, SOLUTION INTRAVENOUS
Status: DISCONTINUED | OUTPATIENT
Start: 2023-02-13 | End: 2023-02-16 | Stop reason: SDUPTHER

## 2023-02-13 RX ORDER — SODIUM CHLORIDE 9 MG/ML
40 INJECTION, SOLUTION INTRAVENOUS AS NEEDED
Status: DISCONTINUED | OUTPATIENT
Start: 2023-02-13 | End: 2023-02-21 | Stop reason: HOSPADM

## 2023-02-13 RX ORDER — OMEGA-3S/DHA/EPA/FISH OIL/D3 300MG-1000
1000 CAPSULE ORAL DAILY
Status: DISCONTINUED | OUTPATIENT
Start: 2023-02-13 | End: 2023-02-21 | Stop reason: HOSPADM

## 2023-02-13 RX ORDER — SPIRONOLACTONE 25 MG/1
12.5 TABLET ORAL DAILY
Status: CANCELLED | OUTPATIENT
Start: 2023-02-13

## 2023-02-13 RX ORDER — POTASSIUM CHLORIDE 20 MEQ/1
40 TABLET, EXTENDED RELEASE ORAL EVERY 4 HOURS
Status: COMPLETED | OUTPATIENT
Start: 2023-02-13 | End: 2023-02-13

## 2023-02-13 RX ORDER — ACETAMINOPHEN 650 MG/1
650 SUPPOSITORY RECTAL EVERY 4 HOURS PRN
Status: DISCONTINUED | OUTPATIENT
Start: 2023-02-13 | End: 2023-02-21 | Stop reason: HOSPADM

## 2023-02-13 RX ORDER — INSULIN ASPART 100 [IU]/ML
28 INJECTION, SOLUTION INTRAVENOUS; SUBCUTANEOUS
Status: CANCELLED | OUTPATIENT
Start: 2023-02-13

## 2023-02-13 RX ORDER — FENTANYL CITRATE 50 UG/ML
50 INJECTION, SOLUTION INTRAMUSCULAR; INTRAVENOUS
Status: DISCONTINUED | OUTPATIENT
Start: 2023-02-13 | End: 2023-02-13 | Stop reason: HOSPADM

## 2023-02-13 RX ORDER — BUMETANIDE 1 MG/1
2 TABLET ORAL 2 TIMES DAILY
Status: CANCELLED | OUTPATIENT
Start: 2023-02-13

## 2023-02-13 RX ORDER — ARIPIPRAZOLE 10 MG/1
10 TABLET ORAL NIGHTLY
Status: DISCONTINUED | OUTPATIENT
Start: 2023-02-13 | End: 2023-02-21 | Stop reason: HOSPADM

## 2023-02-13 RX ORDER — PANTOPRAZOLE SODIUM 40 MG/1
40 TABLET, DELAYED RELEASE ORAL 2 TIMES DAILY
Status: DISCONTINUED | OUTPATIENT
Start: 2023-02-13 | End: 2023-02-21 | Stop reason: HOSPADM

## 2023-02-13 RX ORDER — NICOTINE POLACRILEX 4 MG
15 LOZENGE BUCCAL
Status: DISCONTINUED | OUTPATIENT
Start: 2023-02-13 | End: 2023-02-16 | Stop reason: SDUPTHER

## 2023-02-13 RX ORDER — ONDANSETRON 2 MG/ML
4 INJECTION INTRAMUSCULAR; INTRAVENOUS AS NEEDED
Status: DISCONTINUED | OUTPATIENT
Start: 2023-02-13 | End: 2023-02-13 | Stop reason: HOSPADM

## 2023-02-13 RX ORDER — MODAFINIL 100 MG/1
200 TABLET ORAL DAILY
Status: DISCONTINUED | OUTPATIENT
Start: 2023-02-13 | End: 2023-02-21 | Stop reason: HOSPADM

## 2023-02-13 RX ORDER — SODIUM CHLORIDE 0.9 % (FLUSH) 0.9 %
10 SYRINGE (ML) INJECTION EVERY 12 HOURS SCHEDULED
Status: DISCONTINUED | OUTPATIENT
Start: 2023-02-13 | End: 2023-02-21 | Stop reason: HOSPADM

## 2023-02-13 RX ORDER — IPRATROPIUM BROMIDE AND ALBUTEROL SULFATE 2.5; .5 MG/3ML; MG/3ML
3 SOLUTION RESPIRATORY (INHALATION) ONCE AS NEEDED
Status: DISCONTINUED | OUTPATIENT
Start: 2023-02-13 | End: 2023-02-13 | Stop reason: HOSPADM

## 2023-02-13 RX ORDER — POTASSIUM CHLORIDE 1.5 G/1.77G
40 POWDER, FOR SOLUTION ORAL AS NEEDED
Status: DISCONTINUED | OUTPATIENT
Start: 2023-02-13 | End: 2023-02-21 | Stop reason: HOSPADM

## 2023-02-13 RX ORDER — OXYCODONE HYDROCHLORIDE AND ACETAMINOPHEN 5; 325 MG/1; MG/1
1 TABLET ORAL ONCE AS NEEDED
Status: DISCONTINUED | OUTPATIENT
Start: 2023-02-13 | End: 2023-02-13 | Stop reason: HOSPADM

## 2023-02-13 RX ORDER — SODIUM CHLORIDE 0.9 % (FLUSH) 0.9 %
10 SYRINGE (ML) INJECTION EVERY 12 HOURS SCHEDULED
Status: DISCONTINUED | OUTPATIENT
Start: 2023-02-13 | End: 2023-02-13 | Stop reason: HOSPADM

## 2023-02-13 RX ORDER — ATORVASTATIN CALCIUM 10 MG/1
10 TABLET, FILM COATED ORAL NIGHTLY
Status: DISCONTINUED | OUTPATIENT
Start: 2023-02-13 | End: 2023-02-21 | Stop reason: HOSPADM

## 2023-02-13 RX ORDER — MIDAZOLAM HYDROCHLORIDE 1 MG/ML
INJECTION INTRAMUSCULAR; INTRAVENOUS AS NEEDED
Status: DISCONTINUED | OUTPATIENT
Start: 2023-02-13 | End: 2023-02-13 | Stop reason: SURG

## 2023-02-13 RX ORDER — BUMETANIDE 1 MG/1
2 TABLET ORAL DAILY
Status: DISCONTINUED | OUTPATIENT
Start: 2023-02-13 | End: 2023-02-21 | Stop reason: HOSPADM

## 2023-02-13 RX ORDER — SODIUM CHLORIDE 9 MG/ML
40 INJECTION, SOLUTION INTRAVENOUS AS NEEDED
Status: DISCONTINUED | OUTPATIENT
Start: 2023-02-13 | End: 2023-02-13 | Stop reason: HOSPADM

## 2023-02-13 RX ORDER — GABAPENTIN 400 MG/1
800 CAPSULE ORAL 3 TIMES DAILY
Status: DISCONTINUED | OUTPATIENT
Start: 2023-02-13 | End: 2023-02-21 | Stop reason: HOSPADM

## 2023-02-13 RX ORDER — MAGNESIUM HYDROXIDE 1200 MG/15ML
LIQUID ORAL AS NEEDED
Status: DISCONTINUED | OUTPATIENT
Start: 2023-02-13 | End: 2023-02-13 | Stop reason: HOSPADM

## 2023-02-13 RX ORDER — SODIUM CHLORIDE 0.9 % (FLUSH) 0.9 %
10 SYRINGE (ML) INJECTION AS NEEDED
Status: DISCONTINUED | OUTPATIENT
Start: 2023-02-13 | End: 2023-02-21 | Stop reason: HOSPADM

## 2023-02-13 RX ORDER — ACETAMINOPHEN 325 MG/1
650 TABLET ORAL EVERY 4 HOURS PRN
Status: DISCONTINUED | OUTPATIENT
Start: 2023-02-13 | End: 2023-02-21 | Stop reason: HOSPADM

## 2023-02-13 RX ORDER — DICYCLOMINE HYDROCHLORIDE 10 MG/1
10 CAPSULE ORAL 2 TIMES DAILY
Status: DISCONTINUED | OUTPATIENT
Start: 2023-02-13 | End: 2023-02-21 | Stop reason: HOSPADM

## 2023-02-13 RX ORDER — SACUBITRIL AND VALSARTAN 24; 26 MG/1; MG/1
1 TABLET, FILM COATED ORAL 2 TIMES DAILY
Status: ON HOLD | COMMUNITY

## 2023-02-13 RX ORDER — POTASSIUM CHLORIDE 7.45 MG/ML
10 INJECTION INTRAVENOUS
Status: DISCONTINUED | OUTPATIENT
Start: 2023-02-13 | End: 2023-02-21 | Stop reason: HOSPADM

## 2023-02-13 RX ORDER — SODIUM CHLORIDE 0.9 % (FLUSH) 0.9 %
10 SYRINGE (ML) INJECTION AS NEEDED
Status: DISCONTINUED | OUTPATIENT
Start: 2023-02-13 | End: 2023-02-13 | Stop reason: HOSPADM

## 2023-02-13 RX ORDER — FENTANYL CITRATE 50 UG/ML
INJECTION, SOLUTION INTRAMUSCULAR; INTRAVENOUS AS NEEDED
Status: DISCONTINUED | OUTPATIENT
Start: 2023-02-13 | End: 2023-02-13 | Stop reason: SURG

## 2023-02-13 RX ORDER — CARVEDILOL 12.5 MG/1
12.5 TABLET ORAL 2 TIMES DAILY WITH MEALS
Status: ON HOLD | COMMUNITY
End: 2023-02-21

## 2023-02-13 RX ORDER — NITROGLYCERIN 0.4 MG/1
0.4 TABLET SUBLINGUAL
Status: DISCONTINUED | OUTPATIENT
Start: 2023-02-13 | End: 2023-02-21 | Stop reason: HOSPADM

## 2023-02-13 RX ORDER — BUPIVACAINE HYDROCHLORIDE AND EPINEPHRINE 2.5; 5 MG/ML; UG/ML
INJECTION, SOLUTION EPIDURAL; INFILTRATION; INTRACAUDAL; PERINEURAL AS NEEDED
Status: DISCONTINUED | OUTPATIENT
Start: 2023-02-13 | End: 2023-02-13 | Stop reason: HOSPADM

## 2023-02-13 RX ORDER — DULOXETIN HYDROCHLORIDE 60 MG/1
60 CAPSULE, DELAYED RELEASE ORAL 2 TIMES DAILY
Status: DISCONTINUED | OUTPATIENT
Start: 2023-02-13 | End: 2023-02-21 | Stop reason: HOSPADM

## 2023-02-13 RX ORDER — SODIUM CHLORIDE, SODIUM LACTATE, POTASSIUM CHLORIDE, CALCIUM CHLORIDE 600; 310; 30; 20 MG/100ML; MG/100ML; MG/100ML; MG/100ML
75 INJECTION, SOLUTION INTRAVENOUS CONTINUOUS
Status: ACTIVE | OUTPATIENT
Start: 2023-02-13 | End: 2023-02-13

## 2023-02-13 RX ORDER — INSULIN ASPART 100 [IU]/ML
0-14 INJECTION, SOLUTION INTRAVENOUS; SUBCUTANEOUS
Status: DISCONTINUED | OUTPATIENT
Start: 2023-02-13 | End: 2023-02-16

## 2023-02-13 RX ORDER — METHENAMINE HIPPURATE 1000 MG/1
1 TABLET ORAL 2 TIMES DAILY WITH MEALS
Status: DISCONTINUED | OUTPATIENT
Start: 2023-02-13 | End: 2023-02-13

## 2023-02-13 RX ORDER — ASPIRIN 81 MG/1
81 TABLET ORAL DAILY
Status: CANCELLED | OUTPATIENT
Start: 2023-02-13

## 2023-02-13 RX ORDER — SODIUM CHLORIDE, SODIUM LACTATE, POTASSIUM CHLORIDE, CALCIUM CHLORIDE 600; 310; 30; 20 MG/100ML; MG/100ML; MG/100ML; MG/100ML
125 INJECTION, SOLUTION INTRAVENOUS ONCE
Status: DISCONTINUED | OUTPATIENT
Start: 2023-02-13 | End: 2023-02-13 | Stop reason: HOSPADM

## 2023-02-13 RX ORDER — L.ACID,CASEI/B.ANIMAL/S.THERMO 16B CELL
1 CAPSULE ORAL DAILY
Status: CANCELLED | OUTPATIENT
Start: 2023-02-13

## 2023-02-13 RX ORDER — SUCRALFATE 1 G/1
1 TABLET ORAL DAILY
Status: DISCONTINUED | OUTPATIENT
Start: 2023-02-13 | End: 2023-02-21 | Stop reason: HOSPADM

## 2023-02-13 RX ORDER — METOLAZONE 2.5 MG/1
2.5 TABLET ORAL
Status: CANCELLED | OUTPATIENT
Start: 2023-02-13

## 2023-02-13 RX ORDER — BACLOFEN 10 MG/1
30 TABLET ORAL
Status: DISCONTINUED | OUTPATIENT
Start: 2023-02-13 | End: 2023-02-21 | Stop reason: HOSPADM

## 2023-02-13 RX ORDER — NYSTATIN 100000 [USP'U]/G
POWDER TOPICAL EVERY 12 HOURS SCHEDULED
Status: DISCONTINUED | OUTPATIENT
Start: 2023-02-13 | End: 2023-02-21 | Stop reason: HOSPADM

## 2023-02-13 RX ORDER — ACETAMINOPHEN 160 MG/5ML
650 SOLUTION ORAL EVERY 4 HOURS PRN
Status: DISCONTINUED | OUTPATIENT
Start: 2023-02-13 | End: 2023-02-21 | Stop reason: HOSPADM

## 2023-02-13 RX ADMIN — HUMAN INSULIN 8 UNITS: 100 INJECTION, SOLUTION SUBCUTANEOUS at 10:04

## 2023-02-13 RX ADMIN — BACLOFEN 30 MG: 10 TABLET ORAL at 12:43

## 2023-02-13 RX ADMIN — CEFEPIME 2 G: 2 INJECTION, POWDER, FOR SOLUTION INTRAVENOUS at 20:27

## 2023-02-13 RX ADMIN — Medication 10 ML: at 03:56

## 2023-02-13 RX ADMIN — Medication 1 CAPSULE: at 12:44

## 2023-02-13 RX ADMIN — NYSTATIN: 100000 POWDER TOPICAL at 20:26

## 2023-02-13 RX ADMIN — ATORVASTATIN CALCIUM 10 MG: 10 TABLET, FILM COATED ORAL at 20:27

## 2023-02-13 RX ADMIN — DICYCLOMINE HYDROCHLORIDE 10 MG: 10 CAPSULE ORAL at 20:28

## 2023-02-13 RX ADMIN — NYSTATIN: 100000 POWDER TOPICAL at 04:08

## 2023-02-13 RX ADMIN — Medication 10 ML: at 12:45

## 2023-02-13 RX ADMIN — VANCOMYCIN HYDROCHLORIDE 2500 MG: 5 INJECTION, POWDER, LYOPHILIZED, FOR SOLUTION INTRAVENOUS at 00:21

## 2023-02-13 RX ADMIN — POTASSIUM CHLORIDE 10 MEQ: 7.46 INJECTION, SOLUTION INTRAVENOUS at 09:17

## 2023-02-13 RX ADMIN — VERICIGUAT 2.5 MG: 2.5 TABLET, FILM COATED ORAL at 12:42

## 2023-02-13 RX ADMIN — INSULIN ASPART 12 UNITS: 100 INJECTION, SOLUTION INTRAVENOUS; SUBCUTANEOUS at 12:45

## 2023-02-13 RX ADMIN — MIDAZOLAM HYDROCHLORIDE 2 MG: 1 INJECTION, SOLUTION INTRAMUSCULAR; INTRAVENOUS at 10:14

## 2023-02-13 RX ADMIN — BUMETANIDE 2 MG: 1 TABLET ORAL at 14:40

## 2023-02-13 RX ADMIN — FENTANYL CITRATE 50 MCG: 50 INJECTION INTRAMUSCULAR; INTRAVENOUS at 10:19

## 2023-02-13 RX ADMIN — VANCOMYCIN HYDROCHLORIDE 1250 MG: 5 INJECTION, POWDER, LYOPHILIZED, FOR SOLUTION INTRAVENOUS at 13:14

## 2023-02-13 RX ADMIN — DICYCLOMINE HYDROCHLORIDE 10 MG: 10 CAPSULE ORAL at 12:42

## 2023-02-13 RX ADMIN — INSULIN DETEMIR 20 UNITS: 100 INJECTION, SOLUTION SUBCUTANEOUS at 20:27

## 2023-02-13 RX ADMIN — SUCRALFATE 1 G: 1 TABLET ORAL at 12:42

## 2023-02-13 RX ADMIN — OXYCODONE HYDROCHLORIDE AND ACETAMINOPHEN 500 MG: 500 TABLET ORAL at 12:44

## 2023-02-13 RX ADMIN — INSULIN ASPART 10 UNITS: 100 INJECTION, SOLUTION INTRAVENOUS; SUBCUTANEOUS at 18:12

## 2023-02-13 RX ADMIN — Medication 1000 UNITS: at 12:43

## 2023-02-13 RX ADMIN — POTASSIUM CHLORIDE 40 MEQ: 1500 TABLET, EXTENDED RELEASE ORAL at 14:40

## 2023-02-13 RX ADMIN — PANTOPRAZOLE SODIUM 40 MG: 40 TABLET, DELAYED RELEASE ORAL at 12:43

## 2023-02-13 RX ADMIN — SODIUM CHLORIDE 500 ML: 9 INJECTION, SOLUTION INTRAVENOUS at 09:15

## 2023-02-13 RX ADMIN — MODAFINIL 200 MG: 100 TABLET ORAL at 12:44

## 2023-02-13 RX ADMIN — MAGNESIUM GLUCONATE 500 MG ORAL TABLET 1200 MG: 500 TABLET ORAL at 12:43

## 2023-02-13 RX ADMIN — Medication 1 TABLET: at 12:44

## 2023-02-13 RX ADMIN — NYSTATIN: 100000 POWDER TOPICAL at 12:42

## 2023-02-13 RX ADMIN — DULOXETINE HYDROCHLORIDE 60 MG: 60 CAPSULE, DELAYED RELEASE ORAL at 12:43

## 2023-02-13 RX ADMIN — CEFEPIME HYDROCHLORIDE 2 G: 2 INJECTION, POWDER, FOR SOLUTION INTRAVENOUS at 12:42

## 2023-02-13 RX ADMIN — PROPOFOL 75 MCG/KG/MIN: 10 INJECTION, EMULSION INTRAVENOUS at 10:19

## 2023-02-13 RX ADMIN — SPIRONOLACTONE 25 MG: 25 TABLET ORAL at 14:40

## 2023-02-13 RX ADMIN — DULOXETINE HYDROCHLORIDE 60 MG: 60 CAPSULE, DELAYED RELEASE ORAL at 20:28

## 2023-02-13 RX ADMIN — CARVEDILOL 6.25 MG: 6.25 TABLET, FILM COATED ORAL at 18:12

## 2023-02-13 RX ADMIN — APIXABAN 5 MG: 5 TABLET, FILM COATED ORAL at 20:27

## 2023-02-13 RX ADMIN — APIXABAN 5 MG: 5 TABLET, FILM COATED ORAL at 14:40

## 2023-02-13 RX ADMIN — GABAPENTIN 800 MG: 400 CAPSULE ORAL at 18:12

## 2023-02-13 RX ADMIN — GABAPENTIN 800 MG: 400 CAPSULE ORAL at 20:28

## 2023-02-13 RX ADMIN — INSULIN DETEMIR 10 UNITS: 100 INJECTION, SOLUTION SUBCUTANEOUS at 04:03

## 2023-02-13 RX ADMIN — ARIPIPRAZOLE 10 MG: 10 TABLET ORAL at 20:27

## 2023-02-13 RX ADMIN — SODIUM CHLORIDE, POTASSIUM CHLORIDE, SODIUM LACTATE AND CALCIUM CHLORIDE 75 ML/HR: 600; 310; 30; 20 INJECTION, SOLUTION INTRAVENOUS at 04:04

## 2023-02-13 RX ADMIN — BACLOFEN 30 MG: 10 TABLET ORAL at 20:28

## 2023-02-13 RX ADMIN — BACLOFEN 30 MG: 10 TABLET ORAL at 18:12

## 2023-02-13 RX ADMIN — PANTOPRAZOLE SODIUM 40 MG: 40 TABLET, DELAYED RELEASE ORAL at 20:28

## 2023-02-13 RX ADMIN — FENTANYL CITRATE 50 MCG: 50 INJECTION INTRAMUSCULAR; INTRAVENOUS at 10:17

## 2023-02-13 RX ADMIN — FERROUS SULFATE TAB 325 MG (65 MG ELEMENTAL FE) 325 MG: 325 (65 FE) TAB at 12:43

## 2023-02-13 RX ADMIN — INSULIN ASPART 12 UNITS: 100 INJECTION, SOLUTION INTRAVENOUS; SUBCUTANEOUS at 09:15

## 2023-02-13 RX ADMIN — VANCOMYCIN HYDROCHLORIDE 1250 MG: 5 INJECTION, POWDER, LYOPHILIZED, FOR SOLUTION INTRAVENOUS at 23:47

## 2023-02-13 RX ADMIN — VITAMINS A AND D OINTMENT 1 APPLICATION: 15.5; 53.4 OINTMENT TOPICAL at 20:26

## 2023-02-13 RX ADMIN — POTASSIUM CHLORIDE 40 MEQ: 1500 TABLET, EXTENDED RELEASE ORAL at 18:13

## 2023-02-13 RX ADMIN — Medication 10 ML: at 20:26

## 2023-02-13 RX ADMIN — PIPERACILLIN SODIUM AND TAZOBACTAM SODIUM 3.38 G: 3; .375 INJECTION, POWDER, LYOPHILIZED, FOR SOLUTION INTRAVENOUS at 06:37

## 2023-02-13 NOTE — CASE MANAGEMENT/SOCIAL WORK
Discharge Planning Assessment   Fabricio     Patient Name: Ken Krueger  MRN: [id number]  Today's Date: 2/13/2023    Admit Date: 2/12/2023    Plan: SS received consult per Case Management for discharge planning.  SS spoke with pt at bedside on this date.  Pt resides at home [address] with family and plans to return home at discharge.  [facility] utilizes [facility].  [facility] will need new referral with face to face at discharge.  Pt currently utilizes hospital bed, trapeze bar, patricio lift, wheelchair and electric wheelchair via [facility].  Pt's PCP is Franchesca Hodges.  Pt utilizes [facility].  Pt's brother Pineda is POA. SS will follow and assist with discharge needs.   Discharge Needs Assessment     Row Name 02/13/23 1625       Living Environment    People in Home other relative(s);sibling(s)    Name(s) of People in Home Lives at home with Brother and Mother    Current Living Arrangements home    Primary Care Provided by other (see comments)    Provides Primary Care For no one    Family Caregiver if Needed other (see comments)    Quality of Family Relationships helpful;involved;supportive    Able to Return to Prior Arrangements yes       Resource/Environmental Concerns    Resource/Environmental Concerns none    Transportation Concerns none       Food Insecurity    Within the past 12 months, you worried that your food would run out before you got the money to buy more. Never true    Within the past 12 months, the food you bought just didn't last and you didn't have money to get more. Never true       Transition Planning    Patient/Family Anticipates Transition to home with family    Patient/Family Anticipated Services at Transition home health care       Discharge Needs Assessment    Equipment Currently Used at Home nebulizer;hospital bed;wheelchair;wheelchair, motorized               Discharge Plan     Row Name 02/13/23 1622       Plan    Plan SS received consult per Case Management  for discharge planning.  SS spoke with pt at bedside on this date.  Pt resides at home [address] with family and plans to return home at discharge.  [facility].  [facility] will need new referral with face to face at discharge.  Pt currently utilizes hospital bed, trapeze bar, patricio lift, wheelchair and electric wheelchair via [facility].  Pt's PCP is Franchesca Hodges.  Pt utilizes [facility].  Pt's brother Pineda is JN. SS will follow and assist with discharge needs.              Continued Care and Services - Admitted Since 2/12/2023    Coordination has not been started for this encounter.       Expected Discharge Date and Time     Expected Discharge Date Expected Discharge Time    Feb 17, 2023          Demographic Summary     Row Name 02/13/23 8381       General Information    Admission Type inpatient    Referral Source nursing    Reason for Consult discharge planning    Preferred Language English                Sana Otero, BSW

## 2023-02-13 NOTE — ANESTHESIA POSTPROCEDURE EVALUATION
Patient: Ken Krueger    Procedure Summary     Date: 02/13/23 Room / Location:  [facility] 04 /  [facility]    Anesthesia Start: 1014 Anesthesia Stop: 1110    Procedure: DEBRIDEMENT SACRAL ULCER/WOUND. (Abdomen) Diagnosis:       Acute hematogenous osteomyelitis, unspecified site (HCC)      Pressure injury of sacral region, stage 4 (HCC)      (Acute hematogenous osteomyelitis, unspecified site (HCC) [M86.00])      (Pressure injury of sacral region, stage 4 (HCC) [L89.154])    Surgeons: Ricardo Taylor MD Provider: Ronal Arroyo DO    Anesthesia Type: general ASA Status: 3          Anesthesia Type: general    Vitals  Vitals Value Taken Time   /85 02/13/23 1141   Temp 97.8 °F (36.6 °C) 02/13/23 1141   Pulse 96 02/13/23 1141   Resp 10 02/13/23 1141   SpO2 93 % 02/13/23 1141           Post Anesthesia Care and Evaluation    Patient location during evaluation: PACU  Patient participation: complete - patient participated  Level of consciousness: awake and alert  Pain score: 0  Pain management: adequate    Airway patency: patent  Anesthetic complications: No anesthetic complications  PONV Status: controlled  Cardiovascular status: acceptable  Respiratory status: acceptable and room air  Hydration status: euvolemic  No anesthesia care post op

## 2023-02-13 NOTE — PLAN OF CARE
Patient resting in the bed throughout the night. No s/s of distress noted. No complaints. Patient NPO at midnight for surgery consult. CHERELLE BOOT left in place and NO updated pictures taken PER house supervisor, for wound nurse to assess. Will continue to monitor and follow care plan.

## 2023-02-13 NOTE — ANESTHESIA PREPROCEDURE EVALUATION
Anesthesia Evaluation     Patient summary reviewed and Nursing notes reviewed   no history of anesthetic complications:  NPO Solid Status: > 8 hours  NPO Liquid Status: > 8 hours           Airway   Mallampati: III  TM distance: >3 FB  Neck ROM: limited  Anterior and Possible difficult intubation  Dental - normal exam   (+) poor dentition    Pulmonary    (+) pulmonary embolism, asthma,shortness of breath, sleep apnea, decreased breath sounds,   Cardiovascular - normal exam  Exercise tolerance: unable to assess    ECG reviewed  PT is on anticoagulation therapy  Patient on routine beta blocker and Beta blocker given within 24 hours of surgery  Rhythm: regular  Rate: normal    (+) hypertension, GARY, hyperlipidemia,       Neuro/Psych  (+) neuromuscular disease (T3 spinal cord injury with paraplegia), weakness, numbness,    GI/Hepatic/Renal/Endo    (+) obesity, morbid obesity,  diabetes mellitus type 2 poorly controlled using insulin,     Musculoskeletal     (+) back pain, chronic pain, gait problem,   Abdominal   (+) obese,    Substance History - negative use     OB/GYN negative ob/gyn ROS         Other      history of cancer (skin cancer) remission    ROS/Med Hx Other: Echo 2/8/2023:  •  Left ventricular systolic function is severely decreased. Left ventricular ejection fraction appears to be 21 - 25%.  •  The left ventricular cavity is moderately dilated.  •  Left ventricular wall thickness is consistent with mild to moderate concentric hypertrophy.  •  Left ventricular diastolic function is consistent with (grade III w/high LAP) fixed restrictive pattern.  •  This is a technically limited study with poor echo windows.     Hx PE                      Anesthesia Plan    ASA 3     general   total IV anesthesia  intravenous induction     Anesthetic plan, risks, benefits, and alternatives have been provided, discussed and informed consent has been obtained with: patient.  Pre-procedure education provided  Plan discussed  with CRNA.

## 2023-02-13 NOTE — OP NOTE
PROCEDURE NOTE    Patient Name:  Ken Krueger  YOB: 1975  [id number]    2/13/2023        PREOPERATIVE DIAGNOSIS: Stage IV sacral decubitus wounds      POSTOPERATIVE DIAGNOSIS: Same       PROCEDURE PERFORMED: Excisional debridement of sacral decubitus wound       SURGEON: Ricardo Taylor MD       SPECIMENS: Tissue for culture       ANESTHESIA: General.  Quarter percent Marcaine with epinephrine (used for hemostatic properties)      GRAFTS/IMPLANTS: Floseal       FINDINGS:   1.  Right distal sacral decubitus wound measuring 5 x 2 x 3 cm.  Stage IV.  Clean  2.  Left distal sacral decubitus wound measuring 8 x 3 x 4 cm.  Stage IV.  Clean  3.  The right proximal sacral decubitus wound with necrotic tissue.  Tracking cephalad and towards the midline.  This connected with a small upper gluteal cleft midline wound.  Necrotic fat and muscle within.  Final wound measurements after excisional debridement including skin, soft tissue, fat and muscle measuring 13.5 x 5.5 x 7 cm. Stage IV (down to muscle and ligaments).  No appreciable bone involvement       INDICATIONS:    The patient is a 47 y.o. morbidly obese male paraplegic, poorly controlled diabetic with chronic sacral decubitus wounds who presented with subjective fevers and wound drainage.  There was some necrotic tissue noticed on bedside evaluation.  Risks and benefits of excisional debridement were discussed with the patient and he elected to proceed.     DESCRIPTION OF PROCEDURE:      After obtaining informed consent, the patient was taken to the operating room and placed in the left lateral decubitus  position.  General anesthesia was initiated.  SCDs were placed on the patient and turned on.  Preop antibiotics were administered.  Patient was prepped and draped in a sterile manner and a proper timeout was performed    Some local anesthetic was administered, mostly for its hemostatic properties given the patient's Eliquis dose last night.  The  patient's wounds were measured and most of which appeared clean with pink granulation tissue.  Wounds measured and finding #1 and 2 did not require debridement.  Upon evaluation of a right proximal sacral decubitus wound there was evidence of tracking of necrotic tissue cephalad and towards the midline.  This connected to a very small midline wound at the upper gluteal cleft.  Cautery was then used to excise the skin with necrotic fat and muscle. Some of this deep necrotic tissue was sent for culture. Wound measurements after debridement of 13.5 x 5.5 x 7 cm.  There was no exposed bone but this did expose muscle and ligament.  Hemostasis was achieved with electrocautery.  The wound was irrigated with sterile saline and suctioned out.  Some Floseal was placed along the wound bed for additional hemostasis.  The wound was packed with Kerlix.    At the completion of the case, all needle, instrument and lap counts were correct.  Patient was awakened, extubated and taken to the postop anesthesia care unit for recovery.    30 cc EBL  Ricardo Taylor MD  2/13/2023  11:03 EST

## 2023-02-13 NOTE — PLAN OF CARE
Goal Outcome Evaluation:   Pt resting in bed, watching television. Pt has tolerated all interventions. Dr. Taylor undressed pts gluteal wounds and performed debridement. Was informed by Wound Care team that pt would possibly have wound vac placed in AM. No acute distress noted. Will continue to follow the plan of care.

## 2023-02-13 NOTE — NURSING NOTE
Rounded with Out patient APRN to evaluate wounds. PT has returned form surgery today. Will round with APRN in the morning for possible wound vac placement.

## 2023-02-13 NOTE — PHARMACY PATIENT ASSISTANCE
Pharmacy checked on cost/coverage for entresto and verquvo.  According to his pharmacy, he has no copays on either medications.    Thank You;  Laisha Cabrera, PharmD  02/13/23  16:28 EST
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
## 2023-02-13 NOTE — PROGRESS NOTES
Pharmacy to dose Vancomycin for bone/joint infection : Wt = 144kg. CrCl = 114.2.  dosed as follows: Vancomycin 2500mg iv x 1 then 1250mg iv q12h.  Pharmacy will monitor and adjust dosing to maintain AUC of 400-600.     Erick Stack RPH  04:33 EST  02/13/23

## 2023-02-13 NOTE — PROCEDURES
PICC line PROCEDURE NOTE    Patient Name:  Ken Krueger  YOB: 1975  [id number]    2/13/2023        PREOPERATIVE DIAGNOSIS: Osteomyelitis      POSTOPERATIVE DIAGNOSIS: Same       PROCEDURE PERFORMED: PICC line placement       PROVIDER: GUANACO Valladares       ANESTHESIA: Local       INDICATIONS:    The patient is a 47 y.o. male with osteomyelitis     DESCRIPTION OF PROCEDURE:      Informed consent was obtained.  Timeout was called.  Patient's veins of right upper extremity were visualized using ultrasound.  Measurements were taken.  Patient was then prepped and draped in a sterile fashion.  Ultrasound cover was placed in sterile manner.  Patient's basilic vein was visualized and cannulated with needle.  Guidewire was passed through with no resistance.  Introducer was threaded over guidewire.  Guidewire was then removed.  PICC line was then threaded through the introducer.  Patient tolerated this procedure well.  There is no complications noted.  There was minimal blood loss noted.  PICC line was verified by [facility] ECG.    Measurements include:    Arm circumference is 37 cm  PICC line was trimmed at 46 cm  PICC line is exposed at 0 cm      GUANACO Rodriguez  2/13/2023  16:30 EST

## 2023-02-13 NOTE — NURSING NOTE
Wound consult for denuded area to the sacrum, LT buttock, RT buttock x2, LT buttock and sacral wounds present as chronic stage 4's pressure ulcers with moist and macerated ken wound skin. Sheering presents to surrounding tissue and moisture associated skin damage present to the scrotum. Apply Magic barrier to surrounding tissue. Stage 4s cleanse with normal saline and pat dry. Pack with hydrogel moistened rolled gauze and cover with a silicone border dressing daily.
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
## 2023-02-13 NOTE — PROGRESS NOTES
[facility] HOSPITALIST PROGRESS NOTE     Patient Identification:  Name:  Ken Krueger  Age:  47 y.o.  Sex:  male  :  1975  MRN:  [id number]  Visit Number:  [id number]  Primary Care Provider:  Franchesca Hodges APRN    Length of stay:  1    Subjective: Patient seen and examined assisted by Giovanni Fox RN.  Patient just came back from debridement, findings noted, patient is hyperglycemic, A1c suggest chronic controlled diabetes.  Patient is obese with sleep apnea, requested the patient to have family members to bring his machine for nighttime use.  Also has very poor IV access, PICC line insertion requested in anticipation for extended antibiotic therapy    Chief Complaint: Buttocks ulcer  ----------------------------------------------------------------------------------------------------------------------  Current Hospital Meds:  Acidophilus/Pectin, 1 capsule, Oral, Daily  ARIPiprazole, 10 mg, Oral, Nightly  ascorbic acid, 500 mg, Oral, Daily  atorvastatin, 10 mg, Oral, Nightly  baclofen, 30 mg, Oral, 4x Daily With Meals & Nightly  carvedilol, 6.25 mg, Oral, BID With Meals  cefepime, 2 g, Intravenous, Once  cefepime, 2 g, Intravenous, Q12H  cholecalciferol, 1,000 Units, Oral, Daily  dicyclomine, 10 mg, Oral, BID  DULoxetine, 60 mg, Oral, BID  ferrous sulfate, 325 mg, Oral, Daily With Breakfast  gabapentin, 800 mg, Oral, TID  Insulin Aspart, 0-14 Units, Subcutaneous, TID AC  insulin detemir, 10 Units, Subcutaneous, Nightly  magic barrier cream, 1 application, Topical, BID  magnesium oxide, 1,200 mg, Oral, Daily  modafinil, 200 mg, Oral, Daily  multivitamin with minerals, 1 tablet, Oral, Daily  nystatin, , Topical, Q12H  pantoprazole, 40 mg, Oral, BID  potassium chloride, 40 mEq, Oral, Q4H  sodium chloride, 10 mL, Intravenous, Q12H  sucralfate, 1 g, Oral, Daily  vancomycin, 1,250 mg, Intravenous, Q12H  Vericiguat, 2.5 mg, Oral, Daily      lactated ringers, 75 mL/hr, Last Rate: 75 mL/hr  (02/13/23 0404)  Pharmacy Consult - Pharmacy to dose,       ----------------------------------------------------------------------------------------------------------------------  Vital Signs:  Temp:  [97.3 °F (36.3 °C)-98.7 °F (37.1 °C)] (P) 97.7 °F (36.5 °C)  Heart Rate:  [] (P) 92  Resp:  [9-20] (P) 16  BP: ()/(58-99) (P) 133/86       Tele: Sinus rhythm 97 bpm O2 saturations 93% on 2 L      02/12/23 1919 02/13/23  0500   Weight: (!) 144 kg (318 lb) (!) 144 kg (317 lb 7.4 oz) (NEW ADMIT)     Body mass index is 44.28 kg/m².    Intake/Output Summary (Last 24 hours) at 2/13/2023 1300  Last data filed at 2/13/2023 0055  Gross per 24 hour   Intake 1000 ml   Output --   Net 1000 ml     Diet: Diabetic Diets; Consistent Carbohydrate; Texture: Regular Texture (IDDSI 7); Fluid Consistency: Thin (IDDSI 0)  ----------------------------------------------------------------------------------------------------------------------  Physical exam:  General: Morbidly obese comfortable,awake, alert, oriented to self, place, and time, conversant and pleasant no respiratory distress.    Skin:  Skin is warm and dry. No rash noted. No pallor.    HENT:  Head:  Normocephalic and atraumatic.  Mouth:  Moist mucous membranes.    Eyes:  Conjunctivae and EOM are normal.  Pupils are equal, round, and reactive to light.  No scleral icterus.    Neck:  Neck supple.  No JVD present.    Difficult to assess due to obesity  Pulmonary/Chest:  No respiratory distress, no wheezes, no crackles, with normal breath sounds and good air movement.  Cardiovascular:  Normal rate, regular rhythm and normal heart sounds with no murmur.  Abdominal:  Soft.  Bowel sounds are normal.  No distension and no tenderness.   Extremities: Left dorsal aspect of the wrist small IV access no edema, no tenderness, and no deformity.  No red or swollen joints anywhere.  Left above-knee amputee, right lower extremity atrophy of the leg muscles .  Neurological:  Paraplegic,. No cranial nerve deficit.  No tongue deviation.  No facial droop.  No slurred speech.    Genitourinary: No Garcia catheter  Back:  ----------------------------------------------------------------------------------------------------------------------  ----------------------------------------------------------------------------------------------------------------------      Results from last 7 days   Lab Units 02/13/23  0622 02/13/23 0302 02/12/23 2331 02/12/23 2106   CRP mg/dL  --   --   --  9.14*   LACTATE mmol/L 2.6* 2.6* 3.2* 4.9*   WBC 10*3/mm3  --  13.35*  --  12.75*   HEMOGLOBIN g/dL  --  11.4*  --  12.6*   HEMATOCRIT %  --  37.6  --  41.4   MCV fL  --  86.4  --  87.9   MCHC g/dL  --  30.3*  --  30.4*   PLATELETS 10*3/mm3  --  322  --  439         Results from last 7 days   Lab Units 02/13/23 0302 02/12/23 2106   SODIUM mmol/L 133* 131*   POTASSIUM mmol/L 3.3* 3.3*   MAGNESIUM mg/dL 1.6  --    CHLORIDE mmol/L 93* 87*   CO2 mmol/L 23.3 28.6   BUN mg/dL 31* 30*   CREATININE mg/dL 1.19 1.34*   CALCIUM mg/dL 8.4* 9.5   GLUCOSE mg/dL 398* 519*   ALBUMIN g/dL 3.1* 3.6   BILIRUBIN mg/dL 0.3 0.2   ALK PHOS U/L 108 123*   AST (SGOT) U/L 36 48*   ALT (SGPT) U/L 33 42*   Estimated Creatinine Clearance: 114.2 mL/min (by C-G formula based on SCr of 1.19 mg/dL).    No results found for: AMMONIA      No results found for: BLOODCX  No results found for: URINECX  No results found for: WOUNDCX  No results found for: STOOLCX    I have personally looked at the labs and they are summarized above.  ----------------------------------------------------------------------------------------------------------------------  Imaging Results (Last 24 Hours)     Procedure Component Value Units Date/Time    CT Abdomen Pelvis With Contrast [[id number]] Collected: 02/12/23 2236     Updated: 02/12/23 2238    Narrative:      CT Abdomen Pelvis W    INDICATION:   Chronic decubitus ulcers. Urethral discharge. Urinary tract  infection.    TECHNIQUE:   CT of the abdomen and pelvis with IV contrast. Coronal and sagittal reconstructions were obtained.  Radiation dose reduction techniques included automated exposure control or exposure modulation based on body size. Count of known CT and cardiac nuc med  studies performed in previous 12 months: 4.     COMPARISON:   12/10/2022    FINDINGS:  Abdomen: Included lung bases clear except for some minimal atelectasis in the lingula. There is trace amount of pericardial fluid at the cardiac apex, unchanged. Normal caliber aorta. Borderline splenomegaly and the liver is enlarged, measuring greater  than 26 cm. There is hepatic steatosis. Negative adrenal glands and the pancreas is unremarkable. Surgically absent gallbladder. There is some probable focal fatty sparing along the peripheral hepatic dome. The kidneys are nonobstructed. Punctate  nonobstructing renal stones on the left. There is no threshold adenopathy.    Pelvis: The bladder is decompressed and thick-walled. There is a diverting ileal conduit in the right mid abdomen. Status post Tucker's pouch formation. Diverticulosis with an end colostomy in the left midabdomen. No bowel obstruction. Negative terminal  ileum. Diminutive appendix. There is no drainable fluid collection in either inguinal canal. Prominent but likely reactive inguinal lymph nodes bilaterally, similar to prior.    There are decubitus ulcers at the level of the ischial tuberosities bilaterally. On the right this extends to the bone margin and measures a depth of 7.5 cm. On the left, the ulcer extends over a distance of at least 8 cm and extends to the bone margin  demonstrating chronic sclerotic change that may relate to chronic osteomyelitis. There is gas within both of the decubitus ulcers. There is a third decubitus ulcer that has developed in the interim just to the right of midline at the level of the coccyx.  This extends over a depth of at least 7 cm and also  contains air along the tract. There is soft tissue thickening and there is a pocket of air and fluid adjacent to the coccygeal margin (series 2, image 77 and 78) that measures about 3.7 x 4.1 cm. There  is erosive change of the coccyx and imaging findings are most characteristic of infection/osteomyelitis.    Advanced degenerative change of the hips bilaterally, right greater than left with chronic right hip fracture deformity.      Impression:        1. There is a new decubitus ulcer just to the right of midline at the level of the coccyx with constellation of imaging findings most characteristic of acute infection/osteomyelitis. Small pocket of air and fluid adjacent to the coccyx measuring 3.7 x  4.1 cm could reflect phlegmon or abscess.  2. Chronic bilateral decubitus ulcers at the level of the ischial tuberosities bilaterally with imaging findings suggestive of chronic infection/osteomyelitis, similar to prior.  3. Hepatic steatosis and hepatomegaly with borderline splenomegaly.  4. Nonobstructing left renal stones.  5. Postoperative changes of left lower quadrant end colostomy and right mid abdominal ileal conduit formation.  6. Diverticulosis. No diverticulitis or bowel obstruction.          Signer Name: Ken Arango MD   Signed: 2/12/2023 10:36 PM   Workstation Name: [id number]    [facility]        ----------------------------------------------------------------------------------------------------------------------  Assessment and Plan:  -Sepsis present on admission secondary to lower sacral decubitus ulcer  -Sacral decubitus ulcer with osteomyelitis status post debridement  -Morbid obesity with BMI 44 point  -History of paraplegia  -Diabetes type 2 poorly controlled with hyperglycemia  -Nonischemic cardiomyopathy ejection fraction of 21 to 25%  -Grade 2 diastolic dysfunction  -Hepatic steatosis  -History of PE on chronic anticoagulation  -History of obstructive sleep apnea on CPAP  at    Patient on cefepime and vancomycin, infectious disease and surgery help appreciated, watch for volume overload, wound care, adjust diabetes regimen, restart home regimen including cardiac medication, will consider adding SGLT-2 inhibitor, ARNI prior to discharge, CPAP at night    Disposition patient may benefit SNF or continued care      Yovana Pack MD  02/13/23  13:00 EST
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
## 2023-02-14 LAB
GLUCOSE BLDC GLUCOMTR-MCNC: 428 MG/DL (ref 70–130)
GLUCOSE BLDC GLUCOMTR-MCNC: 441 MG/DL (ref 70–130)
GLUCOSE BLDC GLUCOMTR-MCNC: 484 MG/DL (ref 70–130)
GLUCOSE BLDC GLUCOMTR-MCNC: 488 MG/DL (ref 70–130)
GLUCOSE BLDC GLUCOMTR-MCNC: 509 MG/DL (ref 70–130)
GLUCOSE BLDC GLUCOMTR-MCNC: 514 MG/DL (ref 70–130)
GLUCOSE BLDC GLUCOMTR-MCNC: 523 MG/DL (ref 70–130)

## 2023-02-14 PROCEDURE — 25010000002 VANCOMYCIN 5 G RECONSTITUTED SOLUTION: Performed by: SURGERY

## 2023-02-14 PROCEDURE — 63710000001 INSULIN ASPART PER 5 UNITS: Performed by: SURGERY

## 2023-02-14 PROCEDURE — 63710000001 INSULIN DETEMIR PER 5 UNITS: Performed by: HOSPITALIST

## 2023-02-14 PROCEDURE — 82962 GLUCOSE BLOOD TEST: CPT

## 2023-02-14 PROCEDURE — 63710000001 INSULIN ASPART PER 5 UNITS: Performed by: HOSPITALIST

## 2023-02-14 PROCEDURE — 63710000001 INSULIN ASPART PER 5 UNITS: Performed by: INTERNAL MEDICINE

## 2023-02-14 PROCEDURE — 99232 SBSQ HOSP IP/OBS MODERATE 35: CPT | Performed by: HOSPITALIST

## 2023-02-14 PROCEDURE — 25010000002 CEFEPIME PER 500 MG: Performed by: SURGERY

## 2023-02-14 PROCEDURE — 99232 SBSQ HOSP IP/OBS MODERATE 35: CPT | Performed by: PHYSICIAN ASSISTANT

## 2023-02-14 RX ORDER — INSULIN ASPART 100 [IU]/ML
7 INJECTION, SOLUTION INTRAVENOUS; SUBCUTANEOUS ONCE
Status: COMPLETED | OUTPATIENT
Start: 2023-02-14 | End: 2023-02-14

## 2023-02-14 RX ORDER — INSULIN ASPART 100 [IU]/ML
5 INJECTION, SOLUTION INTRAVENOUS; SUBCUTANEOUS ONCE
Status: COMPLETED | OUTPATIENT
Start: 2023-02-14 | End: 2023-02-14

## 2023-02-14 RX ORDER — INSULIN ASPART 100 [IU]/ML
15 INJECTION, SOLUTION INTRAVENOUS; SUBCUTANEOUS ONCE
Status: COMPLETED | OUTPATIENT
Start: 2023-02-14 | End: 2023-02-14

## 2023-02-14 RX ADMIN — GABAPENTIN 800 MG: 400 CAPSULE ORAL at 08:59

## 2023-02-14 RX ADMIN — INSULIN ASPART 14 UNITS: 100 INJECTION, SOLUTION INTRAVENOUS; SUBCUTANEOUS at 12:03

## 2023-02-14 RX ADMIN — SACUBITRIL AND VALSARTAN 1 TABLET: 24; 26 TABLET, FILM COATED ORAL at 15:43

## 2023-02-14 RX ADMIN — SPIRONOLACTONE 25 MG: 25 TABLET ORAL at 08:59

## 2023-02-14 RX ADMIN — APIXABAN 5 MG: 5 TABLET, FILM COATED ORAL at 08:59

## 2023-02-14 RX ADMIN — GABAPENTIN 800 MG: 400 CAPSULE ORAL at 15:43

## 2023-02-14 RX ADMIN — DICYCLOMINE HYDROCHLORIDE 10 MG: 10 CAPSULE ORAL at 08:59

## 2023-02-14 RX ADMIN — VANCOMYCIN HYDROCHLORIDE 1250 MG: 5 INJECTION, POWDER, LYOPHILIZED, FOR SOLUTION INTRAVENOUS at 12:03

## 2023-02-14 RX ADMIN — BACLOFEN 30 MG: 10 TABLET ORAL at 08:58

## 2023-02-14 RX ADMIN — Medication 10 ML: at 21:23

## 2023-02-14 RX ADMIN — BUMETANIDE 2 MG: 1 TABLET ORAL at 08:59

## 2023-02-14 RX ADMIN — INSULIN ASPART 14 UNITS: 100 INJECTION, SOLUTION INTRAVENOUS; SUBCUTANEOUS at 17:21

## 2023-02-14 RX ADMIN — DULOXETINE HYDROCHLORIDE 60 MG: 60 CAPSULE, DELAYED RELEASE ORAL at 08:59

## 2023-02-14 RX ADMIN — PANTOPRAZOLE SODIUM 40 MG: 40 TABLET, DELAYED RELEASE ORAL at 08:59

## 2023-02-14 RX ADMIN — MAGNESIUM GLUCONATE 500 MG ORAL TABLET 1200 MG: 500 TABLET ORAL at 08:58

## 2023-02-14 RX ADMIN — Medication 10 ML: at 09:00

## 2023-02-14 RX ADMIN — VITAMINS A AND D OINTMENT 1 APPLICATION: 15.5; 53.4 OINTMENT TOPICAL at 09:34

## 2023-02-14 RX ADMIN — BACLOFEN 30 MG: 10 TABLET ORAL at 12:03

## 2023-02-14 RX ADMIN — NYSTATIN: 100000 POWDER TOPICAL at 21:23

## 2023-02-14 RX ADMIN — Medication 1 CAPSULE: at 08:59

## 2023-02-14 RX ADMIN — CARVEDILOL 6.25 MG: 6.25 TABLET, FILM COATED ORAL at 17:22

## 2023-02-14 RX ADMIN — CARVEDILOL 6.25 MG: 6.25 TABLET, FILM COATED ORAL at 08:58

## 2023-02-14 RX ADMIN — ARIPIPRAZOLE 10 MG: 10 TABLET ORAL at 21:24

## 2023-02-14 RX ADMIN — FERROUS SULFATE TAB 325 MG (65 MG ELEMENTAL FE) 325 MG: 325 (65 FE) TAB at 08:59

## 2023-02-14 RX ADMIN — SUCRALFATE 1 G: 1 TABLET ORAL at 08:59

## 2023-02-14 RX ADMIN — CEFEPIME 2 G: 2 INJECTION, POWDER, FOR SOLUTION INTRAVENOUS at 21:23

## 2023-02-14 RX ADMIN — SACUBITRIL AND VALSARTAN 1 TABLET: 24; 26 TABLET, FILM COATED ORAL at 21:24

## 2023-02-14 RX ADMIN — INSULIN ASPART 7 UNITS: 100 INJECTION, SOLUTION INTRAVENOUS; SUBCUTANEOUS at 23:14

## 2023-02-14 RX ADMIN — Medication 1000 UNITS: at 08:59

## 2023-02-14 RX ADMIN — INSULIN ASPART 5 UNITS: 100 INJECTION, SOLUTION INTRAVENOUS; SUBCUTANEOUS at 01:16

## 2023-02-14 RX ADMIN — NYSTATIN: 100000 POWDER TOPICAL at 09:01

## 2023-02-14 RX ADMIN — DULOXETINE HYDROCHLORIDE 60 MG: 60 CAPSULE, DELAYED RELEASE ORAL at 21:24

## 2023-02-14 RX ADMIN — INSULIN DETEMIR 15 UNITS: 100 INJECTION, SOLUTION SUBCUTANEOUS at 09:01

## 2023-02-14 RX ADMIN — ATORVASTATIN CALCIUM 10 MG: 10 TABLET, FILM COATED ORAL at 21:24

## 2023-02-14 RX ADMIN — PANTOPRAZOLE SODIUM 40 MG: 40 TABLET, DELAYED RELEASE ORAL at 21:24

## 2023-02-14 RX ADMIN — VERICIGUAT 2.5 MG: 2.5 TABLET, FILM COATED ORAL at 09:00

## 2023-02-14 RX ADMIN — VITAMINS A AND D OINTMENT 1 APPLICATION: 15.5; 53.4 OINTMENT TOPICAL at 21:23

## 2023-02-14 RX ADMIN — MODAFINIL 200 MG: 100 TABLET ORAL at 08:57

## 2023-02-14 RX ADMIN — DICYCLOMINE HYDROCHLORIDE 10 MG: 10 CAPSULE ORAL at 21:24

## 2023-02-14 RX ADMIN — BACLOFEN 30 MG: 10 TABLET ORAL at 21:24

## 2023-02-14 RX ADMIN — GABAPENTIN 800 MG: 400 CAPSULE ORAL at 21:24

## 2023-02-14 RX ADMIN — INSULIN ASPART 15 UNITS: 100 INJECTION, SOLUTION INTRAVENOUS; SUBCUTANEOUS at 07:51

## 2023-02-14 RX ADMIN — INSULIN DETEMIR 15 UNITS: 100 INJECTION, SOLUTION SUBCUTANEOUS at 21:14

## 2023-02-14 RX ADMIN — INSULIN ASPART 5 UNITS: 100 INJECTION, SOLUTION INTRAVENOUS; SUBCUTANEOUS at 21:14

## 2023-02-14 RX ADMIN — OXYCODONE HYDROCHLORIDE AND ACETAMINOPHEN 500 MG: 500 TABLET ORAL at 08:59

## 2023-02-14 RX ADMIN — CEFEPIME 2 G: 2 INJECTION, POWDER, FOR SOLUTION INTRAVENOUS at 09:06

## 2023-02-14 RX ADMIN — BACLOFEN 30 MG: 10 TABLET ORAL at 17:22

## 2023-02-14 RX ADMIN — APIXABAN 5 MG: 5 TABLET, FILM COATED ORAL at 21:24

## 2023-02-14 RX ADMIN — Medication 1 TABLET: at 08:59

## 2023-02-14 RX ADMIN — VANCOMYCIN HYDROCHLORIDE 1250 MG: 5 INJECTION, POWDER, LYOPHILIZED, FOR SOLUTION INTRAVENOUS at 23:32

## 2023-02-14 NOTE — PLAN OF CARE
Goal Outcome Evaluation:      Pt is currently resting comfortably. No s/s of acute distress noted at this time. Pt has had no complaints this shift. Will continue with plan of care.

## 2023-02-14 NOTE — PROGRESS NOTES
PROGRESS NOTE         Patient Identification:  Name:  Ken Krueger  Age:  47 y.o.  Sex:  male  :  1975  MRN:  [id number]  Visit Number:  [id number]  Primary Care Provider:  Franchesca Hodges APRN         LOS: 2 days       ----------------------------------------------------------------------------------------------------------------------  Subjective       Chief Complaints:    Wound Check        Interval History:      Patient is sitting up in bed on 3 L nasal cannula in no apparent distress.  Status post excisional debridement of sacral decubitus wound.  There was no appreciable bone involvement, but there was some tracking.  Patient denies any new complaints at this time including shortness of breath, cough, abdominal pain, nausea, vomiting, or diarrhea.  Low-grade fever with Tmax of 99.1 °F.  No increased output from ostomy.  No new labs today.  Tissue culture of the right buttock on 2023 is so far showing gram-negative bacilli.  Blood cultures on 2023 are so far showing no growth.    Review of Systems:    Constitutional: no fever, chills and night sweats.  No fatigue.  Eyes: no eye drainage, itching or redness.  HEENT: no mouth sores, dysphagia or nose bleed.  Respiratory: no for shortness of breath, cough or production of sputum.  Cardiovascular: no chest pain, no palpitations, no orthopnea.  Gastrointestinal: no nausea, vomiting or diarrhea. No abdominal pain, hematemesis or rectal bleeding.  Genitourinary: no dysuria or polyuria.  Hematologic/lymphatic: no lymph node abnormalities, no easy bruising or easy bleeding.  Musculoskeletal: no muscle or joint pain.  Skin: No rash and no itching.  Decubitus ulcers.  Neurological: no loss of consciousness, no seizure, no headache.  Behavioral/Psych: no depression or suicidal ideation.  Endocrine: no hot flashes.  Immunologic:  negative.    ----------------------------------------------------------------------------------------------------------------------      Objective       [facility] Meds:  Acidophilus/Pectin, 1 capsule, Oral, Daily  apixaban, 5 mg, Oral, Q12H  ARIPiprazole, 10 mg, Oral, Nightly  ascorbic acid, 500 mg, Oral, Daily  atorvastatin, 10 mg, Oral, Nightly  baclofen, 30 mg, Oral, 4x Daily With Meals & Nightly  bumetanide, 2 mg, Oral, Daily  carvedilol, 6.25 mg, Oral, BID With Meals  cefepime, 2 g, Intravenous, Q12H  cholecalciferol, 1,000 Units, Oral, Daily  dicyclomine, 10 mg, Oral, BID  DULoxetine, 60 mg, Oral, BID  ferrous sulfate, 325 mg, Oral, Daily With Breakfast  gabapentin, 800 mg, Oral, TID  Insulin Aspart, 0-14 Units, Subcutaneous, TID AC  insulin detemir, 15 Units, Subcutaneous, Q12H  magic barrier cream, 1 application, Topical, BID  magnesium oxide, 1,200 mg, Oral, Daily  modafinil, 200 mg, Oral, Daily  multivitamin with minerals, 1 tablet, Oral, Daily  nystatin, , Topical, Q12H  pantoprazole, 40 mg, Oral, BID  sodium chloride, 10 mL, Intravenous, Q12H  spironolactone, 25 mg, Oral, Daily  sucralfate, 1 g, Oral, Daily  vancomycin, 1,250 mg, Intravenous, Q12H  Vericiguat, 2.5 mg, Oral, Daily      Pharmacy Consult - Pharmacy to dose,   Pharmacy Consult - Pharmacy to dose,       ----------------------------------------------------------------------------------------------------------------------    Vital Signs:  Temp:  [97.4 °F (36.3 °C)-99.1 °F (37.3 °C)] 98.8 °F (37.1 °C)  Heart Rate:  [] 82  Resp:  [9-19] 18  BP: (105-142)/(54-99) 135/75  Mean Arterial Pressure (Non-Invasive) for the past 24 hrs (Last 3 readings):   Noninvasive MAP (mmHg)   02/14/23 0650 93   02/14/23 0203 82   02/13/23 2231 82     SpO2 Percentage    02/13/23 2231 02/14/23 0203 02/14/23 0650   SpO2: 96% 95% 96%     SpO2:  [92 %-97 %] 96 %  on  Flow (L/min):  [3-4] 3;   Device (Oxygen Therapy): nasal cannula    Body mass index is 43.96  kg/m².  Wt Readings from Last 3 Encounters:   02/14/23 (!) 143 kg (315 lb 3.2 oz)   12/13/22 (!) 141 kg (310 lb 14.4 oz)   12/02/22 (!) 150 kg (330 lb)        Intake/Output Summary (Last 24 hours) at 2/14/2023 0903  Last data filed at 2/14/2023 0300  Gross per 24 hour   Intake 360 ml   Output 48177 ml   Net -58462 ml     Diet: Diabetic Diets; Consistent Carbohydrate; Texture: Regular Texture (IDDSI 7); Fluid Consistency: Thin (IDDSI 0)  ----------------------------------------------------------------------------------------------------------------------      Physical Exam:    Constitutional: Very pleasant obese male is sitting up in bed on room air in no apparent distress.  HENT:  Head: Normocephalic and atraumatic.  Mouth:  Moist mucous membranes.    Eyes:  Conjunctivae and EOM are normal.  No scleral icterus.  Neck:  Neck supple.  No JVD present.    Cardiovascular:  Normal rate, regular rhythm and normal heart sounds with no murmur. No edema.  Pulmonary/Chest:  No respiratory distress, no wheezes, no crackles, with normal breath sounds and good air movement.  Abdominal: Obese.  Soft.  Bowel sounds are normal.  No distension and no tenderness.  Ileal conduit and colostomy bag in place.  Musculoskeletal:  No edema and no tenderness.  No swelling or redness of joints.  Left AKA.  Neurological:  Alert and oriented to person, place, and time.  No facial droop.  No slurred speech.   Skin:  Stage IV sacral decubitus wound status post excisional debridement.  Psychiatric:  Normal mood and affect.  Behavior is normal.      ----------------------------------------------------------------------------------------------------------------------            Results from last 7 days   Lab Units 02/13/23  1852 02/13/23  1215 02/13/23  0622 02/13/23  0302 02/12/23  2331 02/12/23  2106   CRP mg/dL  --   --   --   --   --  9.14*   LACTATE mmol/L 1.6 2.2* 2.6* 2.6*   < > 4.9*   WBC 10*3/mm3  --   --   --  13.35*  --  12.75*    HEMOGLOBIN g/dL  --   --   --  11.4*  --  12.6*   HEMATOCRIT %  --   --   --  37.6  --  41.4   MCV fL  --   --   --  86.4  --  87.9   MCHC g/dL  --   --   --  30.3*  --  30.4*   PLATELETS 10*3/mm3  --   --   --  322  --  439    < > = values in this interval not displayed.     Results from last 7 days   Lab Units 02/13/23  0302 02/12/23  2106   SODIUM mmol/L 133* 131*   POTASSIUM mmol/L 3.3* 3.3*   MAGNESIUM mg/dL 1.6  --    CHLORIDE mmol/L 93* 87*   CO2 mmol/L 23.3 28.6   BUN mg/dL 31* 30*   CREATININE mg/dL 1.19 1.34*   CALCIUM mg/dL 8.4* 9.5   GLUCOSE mg/dL 398* 519*   ALBUMIN g/dL 3.1* 3.6   BILIRUBIN mg/dL 0.3 0.2   ALK PHOS U/L 108 123*   AST (SGOT) U/L 36 48*   ALT (SGPT) U/L 33 42*   Estimated Creatinine Clearance: 113.1 mL/min (by C-G formula based on SCr of 1.19 mg/dL).  No results found for: AMMONIA    Hemoglobin A1C   Date/Time Value Ref Range Status   02/13/2023 0302 11.50 (H) 4.80 - 5.60 % Final     Glucose   Date/Time Value Ref Range Status   02/14/2023 0649 514 (C) 70 - 130 mg/dL Final     Comment:     Confirmed by Repeat Meter: [id number] : [id number] JESSICA AQUINO   02/14/2023 0059 428 (C) 70 - 130 mg/dL Final     Comment:     Confirmed by Repeat RN Notified Meter: [id number] : [id number] alvin rubio   02/13/2023 1841 368 (H) 70 - 130 mg/dL Final     Comment:     Meter: [id number] : [id number] DAGMARCHELI KEMP   02/13/2023 1715 333 (H) 70 - 130 mg/dL Final     Comment:     Meter: [id number] : [id number] SHARIFA ORLNADO   02/13/2023 1157 375 (H) 70 - 130 mg/dL Final     Comment:     Meter: [id number] : [id number] SHARIFA VIRGINIA   02/13/2023 1123 352 (H) 70 - 130 mg/dL Final     Comment:     Meter: [id number] : [id number] Rosalind Edge   02/13/2023 0957 417 (C) 70 - 130 mg/dL Final     Comment:     RN Notified Meter: [id number] : [id number] Vidal Caldera   02/13/2023 0909 397 (H) 70 - 130 mg/dL Final     Comment:     Meter: [id number] : [id number] [facility]  Results   Component Value Date    HGBA1C 11.50 (H) 02/13/2023     Lab Results   Component Value Date    TSH 3.780 07/19/2022    FREET4 1.31 06/02/2021       Blood Culture   Date Value Ref Range Status   02/12/2023 No growth at 24 hours  Preliminary   02/12/2023 No growth at 24 hours  Preliminary     No results found for: URINECX  No results found for: WOUNDCX  No results found for: STOOLCX  No results found for: RESPCX  Pain Management Panel       Pain Management Panel Latest Ref Rng & Units 6/2/2021 8/19/2018    AMPHETAMINES SCREEN, URINE Negative Negative Negative    BARBITURATES SCREEN Negative Negative Negative    BENZODIAZEPINE SCREEN, URINE Negative Negative Negative    BUPRENORPHINEUR Negative Negative Negative    COCAINE SCREEN, URINE Negative Negative Negative    METHADONE SCREEN, URINE Negative Negative Negative              ----------------------------------------------------------------------------------------------------------------------  Imaging Results (Last 24 Hours)       ** No results found for the last 24 hours. **            -----------------------------------------------------------------------------------  Pertinent Infectious Disease Results     CT abdomen pelvis with contrast on 2/12/2023 showed there is a new decubitus ulcer just to the right of midline at the level of the coccyx with constellation of imaging findings most characteristic of acute infection/osteomyelitis. Small pocket of air and fluid adjacent to the coccyx measuring 3.7 x 4.1 cm could reflect phlegmon or abscess. Chronic bilateral decubitus ulcers at the level of the ischial tuberosities bilaterally with imaging findings suggestive of chronic infection/osteomyelitis, similar to prior. Hepatic steatosis and hepatomegaly with borderline splenomegaly. Nonobstructing left renal stones. Postoperative changes of left lower quadrant and colostomy and right mid abdominal ileal conduit formation. Diverticulosis. No diverticulitis or  bowel obstruction. COVID-19 and flu A/B PCR on 2/12/2023 was negative.      Assessment/Plan         ASSESSMENT:    Septic shock with lactic acid greater than 4 on admission  Acute and chronic decubitus ulcerations with osteomyelitis and possible abscess      PLAN:    I examined the patient this morning and he is sitting up in bed on 3 L nasal cannula in no apparent distress.  Status post excisional debridement of sacral decubitus wound.  There was no appreciable bone involvement, but there was some tracking.  Patient denies any new complaints at this time including shortness of breath, cough, abdominal pain, nausea, vomiting, or diarrhea.  Low-grade fever with Tmax of 99.1 °F.  No increased output from ostomy.  Lungs are clear to auscultation bilaterally.  Abdomen is rounded, but soft, nontender, with normal bowel sounds.  No edema of right lower extremity.  No new labs today.  Tissue culture of the right buttock on 2/13/2023 is so far showing gram-negative bacilli.  Blood cultures on 2/12/2023 are so far showing no growth.    Recommend to continue on vancomycin and cefepime for now while awaiting finalization of wound culture results.  Difficult case due to multidrug-resistant organisms in the past and complicated wound care.    Code Status:   Code Status and Medical Interventions:   Ordered at: 02/12/23 6097     Code Status (Patient has no pulse and is not breathing):    CPR (Attempt to Resuscitate)     Medical Interventions (Patient has pulse or is breathing):    Full Support       Christy Caro PA-C  02/14/23  09:03 EST

## 2023-02-14 NOTE — PLAN OF CARE
Goal Outcome Evaluation:      Pt resting in bed, no c/o CP or acute distress this shift. Continues on IV ABX for possible osteomylitis.  Wound care done at bedside by wound care nurse and surgeon NP, pt tolerated well. VSS, SPO2 stable on RA, home CPAP at bedside. Colostomy and ileostomy patent and draining without difficulty. Insulin given to correct BG, MD aware.   Referral to Continue Care in place.

## 2023-02-14 NOTE — PROGRESS NOTES
[facility] HOSPITALIST PROGRESS NOTE     Patient Identification:  Name:  Ken Krueger  Age:  47 y.o.  Sex:  male  :  1975  MRN:  [id number]  Visit Number:  [id number]  Primary Care Provider:  Franchesca Hodges APRN    Length of stay:  2    Subjective: Patient seen and examined, wound APRN evaluation noted appreciated, this visit is assisted by Kianna Denney RN present inside the room.  Patient is eating well, still has significant hyperglycemia, aggressive adjustments were made on his medication regimen.  View of his Accu-Chek machine his glucose has been running 300+ for the past month.  Tissue bone culture is gram-negative bacilli pending identification and sensitivity    Chief Complaint: Sacral decubitus ulcer  ----------------------------------------------------------------------------------------------------------------------  Current Hospital Meds:  Acidophilus/Pectin, 1 capsule, Oral, Daily  apixaban, 5 mg, Oral, Q12H  ARIPiprazole, 10 mg, Oral, Nightly  ascorbic acid, 500 mg, Oral, Daily  atorvastatin, 10 mg, Oral, Nightly  baclofen, 30 mg, Oral, 4x Daily With Meals & Nightly  bumetanide, 2 mg, Oral, Daily  carvedilol, 6.25 mg, Oral, BID With Meals  cefepime, 2 g, Intravenous, Q12H  cholecalciferol, 1,000 Units, Oral, Daily  dicyclomine, 10 mg, Oral, BID  DULoxetine, 60 mg, Oral, BID  ferrous sulfate, 325 mg, Oral, Daily With Breakfast  gabapentin, 800 mg, Oral, TID  Insulin Aspart, 0-14 Units, Subcutaneous, TID AC  insulin detemir, 15 Units, Subcutaneous, Q12H  magic barrier cream, 1 application, Topical, BID  magnesium oxide, 1,200 mg, Oral, Daily  modafinil, 200 mg, Oral, Daily  multivitamin with minerals, 1 tablet, Oral, Daily  nystatin, , Topical, Q12H  pantoprazole, 40 mg, Oral, BID  sodium chloride, 10 mL, Intravenous, Q12H  spironolactone, 25 mg, Oral, Daily  sucralfate, 1 g, Oral, Daily  vancomycin, 1,250 mg, Intravenous, Q12H  Vericiguat, 2.5 mg, Oral, Daily      Pharmacy  Consult - Pharmacy to dose,   Pharmacy Consult - Pharmacy to dose,       ----------------------------------------------------------------------------------------------------------------------  Vital Signs:  Temp:  [97.7 °F (36.5 °C)-99.1 °F (37.3 °C)] 98.9 °F (37.2 °C)  Heart Rate:  [82-95] 86  Resp:  [16-19] 18  BP: (105-151)/(54-91) 151/70       Tele: Sinus rhythm 87 bpm      02/12/23  1919 02/13/23  0500 02/14/23  0500   Weight: (!) 144 kg (318 lb) (!) 144 kg (317 lb 7.4 oz) (NEW ADMIT) (!) 143 kg (315 lb 3.2 oz)     Body mass index is 43.96 kg/m².    Intake/Output Summary (Last 24 hours) at 2/14/2023 1231  Last data filed at 2/14/2023 1203  Gross per 24 hour   Intake 670 ml   Output 99878 ml   Net -76984 ml     Diet: Diabetic Diets; Consistent Carbohydrate; Texture: Regular Texture (IDDSI 7); Fluid Consistency: Thin (IDDSI 0)  ----------------------------------------------------------------------------------------------------------------------  Physical exam:  General: Morbidly obese comfortable,awake, alert, oriented to self, place, and time, well-developed and well-nourished.  No respiratory distress.    Skin:  Skin is warm and dry. No rash noted. No pallor.    HENT:  Head:  Normocephalic and atraumatic.  Mouth:  Moist mucous membranes.    Eyes:  Conjunctivae and EOM are normal.  Pupils are equal, round, and reactive to light.  No scleral icterus.    Neck:  Neck supple.  No JVD present.    Pulmonary/Chest:  No respiratory distress, no wheezes, no crackles, with normal breath sounds and good air movement.  Cardiovascular:  Normal rate, regular rhythm and normal heart sounds with no murmur.  Abdominal: Large panniculus, left colostomy, right ileostomy with good urine output clear elvin urine soft.  Bowel sounds are normal.  No distension and no tenderness.   Extremities: Some atrophy of the legs bilateral, no asymmetry, no edema, no tenderness, and no deformity.  No red or swollen joints anywhere.  Strong pulses  in all 4 extremities with no clubbing, no cyanosis, no edema.  Neurological:  Motor strength equal no obvious deficit, sensory grossly intact.   No cranial nerve deficit.  No tongue deviation.  No facial droop.  No slurred speech.    Genitourinary: Right-sided ileostomy  Back:  ----------------------------------------------------------------------------------------------------------------------  ----------------------------------------------------------------------------------------------------------------------      Results from last 7 days   Lab Units 02/13/23  1852 02/13/23  1215 02/13/23  0622 02/13/23  0302 02/12/23  2331 02/12/23  2106   CRP mg/dL  --   --   --   --   --  9.14*   LACTATE mmol/L 1.6 2.2* 2.6* 2.6*   < > 4.9*   WBC 10*3/mm3  --   --   --  13.35*  --  12.75*   HEMOGLOBIN g/dL  --   --   --  11.4*  --  12.6*   HEMATOCRIT %  --   --   --  37.6  --  41.4   MCV fL  --   --   --  86.4  --  87.9   MCHC g/dL  --   --   --  30.3*  --  30.4*   PLATELETS 10*3/mm3  --   --   --  322  --  439    < > = values in this interval not displayed.         Results from last 7 days   Lab Units 02/13/23  0302 02/12/23  2106   SODIUM mmol/L 133* 131*   POTASSIUM mmol/L 3.3* 3.3*   MAGNESIUM mg/dL 1.6  --    CHLORIDE mmol/L 93* 87*   CO2 mmol/L 23.3 28.6   BUN mg/dL 31* 30*   CREATININE mg/dL 1.19 1.34*   CALCIUM mg/dL 8.4* 9.5   GLUCOSE mg/dL 398* 519*   ALBUMIN g/dL 3.1* 3.6   BILIRUBIN mg/dL 0.3 0.2   ALK PHOS U/L 108 123*   AST (SGOT) U/L 36 48*   ALT (SGPT) U/L 33 42*   Estimated Creatinine Clearance: 113.1 mL/min (by C-G formula based on SCr of 1.19 mg/dL).    No results found for: AMMONIA      Blood Culture   Date Value Ref Range Status   02/12/2023 No growth at 24 hours  Preliminary   02/12/2023 No growth at 24 hours  Preliminary     No results found for: URINECX  No results found for: WOUNDCX  No results found for: STOOLCX    I have personally looked at the labs and they are summarized  above.  ----------------------------------------------------------------------------------------------------------------------  Imaging Results (Last 24 Hours)     ** No results found for the last 24 hours. **        ----------------------------------------------------------------------------------------------------------------------  Assessment and Plan:  -Sepsis present on admission secondary to sacral decubitus ulcer stage IV present on admission status post 3 prior  -Morbid obesity with BMI of 44 complicating all aspects of care  -Paraplegia  -Diabetes type 2 poorly controlled with hyperglycemia  -History of nonischemic cardiomyopathy EF of 21 to 25%  -Grade 2 diastolic dysfunction  -Hepatic steatosis  -History of obstructive sleep apnea on CPAP at night  -History of PE    Adjust insulin regimen, antibiotic care of infectious disease recommendation, wound care, CPAP at night.  Blood pressure is elevated, we can start Aldactone, ARNI and beta-blocker.  SGLT2 inhibitor on discharge relieve pressure from the back to promote healing.      Disposition may benefit continued care      Yovana Pack MD  02/14/23  12:31 EST
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
## 2023-02-15 LAB
ANION GAP SERPL CALCULATED.3IONS-SCNC: 7.8 MMOL/L (ref 5–15)
BUN SERPL-MCNC: 21 MG/DL (ref 6–20)
BUN/CREAT SERPL: 24.1 (ref 7–25)
CALCIUM SPEC-SCNC: 8.7 MG/DL (ref 8.6–10.5)
CHLORIDE SERPL-SCNC: 96 MMOL/L (ref 98–107)
CO2 SERPL-SCNC: 29.2 MMOL/L (ref 22–29)
CREAT SERPL-MCNC: 0.87 MG/DL (ref 0.76–1.27)
EGFRCR SERPLBLD CKD-EPI 2021: 108.4 ML/MIN/1.73
GLUCOSE BLDC GLUCOMTR-MCNC: 347 MG/DL (ref 70–130)
GLUCOSE BLDC GLUCOMTR-MCNC: 396 MG/DL (ref 70–130)
GLUCOSE BLDC GLUCOMTR-MCNC: 396 MG/DL (ref 70–130)
GLUCOSE BLDC GLUCOMTR-MCNC: 412 MG/DL (ref 70–130)
GLUCOSE BLDC GLUCOMTR-MCNC: 423 MG/DL (ref 70–130)
GLUCOSE BLDC GLUCOMTR-MCNC: 425 MG/DL (ref 70–130)
GLUCOSE BLDC GLUCOMTR-MCNC: 430 MG/DL (ref 70–130)
GLUCOSE SERPL-MCNC: 398 MG/DL (ref 65–99)
POTASSIUM SERPL-SCNC: 3.2 MMOL/L (ref 3.5–5.2)
POTASSIUM SERPL-SCNC: 4 MMOL/L (ref 3.5–5.2)
SODIUM SERPL-SCNC: 133 MMOL/L (ref 136–145)
VANCOMYCIN TROUGH SERPL-MCNC: 9.3 MCG/ML (ref 5–20)

## 2023-02-15 PROCEDURE — 99232 SBSQ HOSP IP/OBS MODERATE 35: CPT | Performed by: INTERNAL MEDICINE

## 2023-02-15 PROCEDURE — 63710000001 INSULIN DETEMIR PER 5 UNITS: Performed by: HOSPITALIST

## 2023-02-15 PROCEDURE — 80202 ASSAY OF VANCOMYCIN: CPT

## 2023-02-15 PROCEDURE — 84132 ASSAY OF SERUM POTASSIUM: CPT | Performed by: HOSPITALIST

## 2023-02-15 PROCEDURE — 80048 BASIC METABOLIC PNL TOTAL CA: CPT | Performed by: HOSPITALIST

## 2023-02-15 PROCEDURE — 99232 SBSQ HOSP IP/OBS MODERATE 35: CPT | Performed by: HOSPITALIST

## 2023-02-15 PROCEDURE — 82962 GLUCOSE BLOOD TEST: CPT

## 2023-02-15 PROCEDURE — 25010000002 CEFTRIAXONE PER 250 MG: Performed by: INTERNAL MEDICINE

## 2023-02-15 PROCEDURE — 25010000002 VANCOMYCIN 5 G RECONSTITUTED SOLUTION: Performed by: SURGERY

## 2023-02-15 PROCEDURE — 63710000001 INSULIN ASPART PER 5 UNITS: Performed by: HOSPITALIST

## 2023-02-15 PROCEDURE — 63710000001 INSULIN ASPART PER 5 UNITS: Performed by: SURGERY

## 2023-02-15 RX ORDER — INSULIN ASPART 100 [IU]/ML
8 INJECTION, SOLUTION INTRAVENOUS; SUBCUTANEOUS ONCE
Status: COMPLETED | OUTPATIENT
Start: 2023-02-15 | End: 2023-02-15

## 2023-02-15 RX ORDER — INSULIN ASPART 100 [IU]/ML
7 INJECTION, SOLUTION INTRAVENOUS; SUBCUTANEOUS ONCE
Status: COMPLETED | OUTPATIENT
Start: 2023-02-15 | End: 2023-02-16

## 2023-02-15 RX ORDER — POTASSIUM CHLORIDE 20 MEQ/1
40 TABLET, EXTENDED RELEASE ORAL EVERY 4 HOURS
Status: COMPLETED | OUTPATIENT
Start: 2023-02-15 | End: 2023-02-15

## 2023-02-15 RX ORDER — INSULIN ASPART 100 [IU]/ML
15 INJECTION, SOLUTION INTRAVENOUS; SUBCUTANEOUS
Status: DISCONTINUED | OUTPATIENT
Start: 2023-02-15 | End: 2023-02-16

## 2023-02-15 RX ADMIN — INSULIN ASPART 8 UNITS: 100 INJECTION, SOLUTION INTRAVENOUS; SUBCUTANEOUS at 18:52

## 2023-02-15 RX ADMIN — SPIRONOLACTONE 25 MG: 25 TABLET ORAL at 09:02

## 2023-02-15 RX ADMIN — FERROUS SULFATE TAB 325 MG (65 MG ELEMENTAL FE) 325 MG: 325 (65 FE) TAB at 09:02

## 2023-02-15 RX ADMIN — GABAPENTIN 800 MG: 400 CAPSULE ORAL at 21:46

## 2023-02-15 RX ADMIN — APIXABAN 5 MG: 5 TABLET, FILM COATED ORAL at 09:03

## 2023-02-15 RX ADMIN — VANCOMYCIN HYDROCHLORIDE 1250 MG: 5 INJECTION, POWDER, LYOPHILIZED, FOR SOLUTION INTRAVENOUS at 12:12

## 2023-02-15 RX ADMIN — GABAPENTIN 800 MG: 400 CAPSULE ORAL at 17:31

## 2023-02-15 RX ADMIN — BACLOFEN 30 MG: 10 TABLET ORAL at 09:01

## 2023-02-15 RX ADMIN — Medication 10 ML: at 09:04

## 2023-02-15 RX ADMIN — SACUBITRIL AND VALSARTAN 1 TABLET: 24; 26 TABLET, FILM COATED ORAL at 21:47

## 2023-02-15 RX ADMIN — APIXABAN 5 MG: 5 TABLET, FILM COATED ORAL at 21:51

## 2023-02-15 RX ADMIN — OXYCODONE HYDROCHLORIDE AND ACETAMINOPHEN 500 MG: 500 TABLET ORAL at 09:03

## 2023-02-15 RX ADMIN — SUCRALFATE 1 G: 1 TABLET ORAL at 09:03

## 2023-02-15 RX ADMIN — INSULIN ASPART 15 UNITS: 100 INJECTION, SOLUTION INTRAVENOUS; SUBCUTANEOUS at 12:17

## 2023-02-15 RX ADMIN — Medication 1 TABLET: at 09:03

## 2023-02-15 RX ADMIN — DULOXETINE HYDROCHLORIDE 60 MG: 60 CAPSULE, DELAYED RELEASE ORAL at 09:01

## 2023-02-15 RX ADMIN — MODAFINIL 200 MG: 100 TABLET ORAL at 09:01

## 2023-02-15 RX ADMIN — CEFTRIAXONE 2 G: 2 INJECTION, POWDER, FOR SOLUTION INTRAMUSCULAR; INTRAVENOUS at 09:03

## 2023-02-15 RX ADMIN — SACUBITRIL AND VALSARTAN 1 TABLET: 24; 26 TABLET, FILM COATED ORAL at 09:03

## 2023-02-15 RX ADMIN — MAGNESIUM GLUCONATE 500 MG ORAL TABLET 1200 MG: 500 TABLET ORAL at 09:02

## 2023-02-15 RX ADMIN — INSULIN ASPART 14 UNITS: 100 INJECTION, SOLUTION INTRAVENOUS; SUBCUTANEOUS at 17:30

## 2023-02-15 RX ADMIN — ARIPIPRAZOLE 10 MG: 10 TABLET ORAL at 21:47

## 2023-02-15 RX ADMIN — INSULIN ASPART 12 UNITS: 100 INJECTION, SOLUTION INTRAVENOUS; SUBCUTANEOUS at 09:04

## 2023-02-15 RX ADMIN — POTASSIUM CHLORIDE 40 MEQ: 1500 TABLET, EXTENDED RELEASE ORAL at 06:20

## 2023-02-15 RX ADMIN — NYSTATIN: 100000 POWDER TOPICAL at 21:47

## 2023-02-15 RX ADMIN — Medication 1000 UNITS: at 09:02

## 2023-02-15 RX ADMIN — Medication 10 ML: at 21:47

## 2023-02-15 RX ADMIN — VITAMINS A AND D OINTMENT 1 APPLICATION: 15.5; 53.4 OINTMENT TOPICAL at 21:47

## 2023-02-15 RX ADMIN — PANTOPRAZOLE SODIUM 40 MG: 40 TABLET, DELAYED RELEASE ORAL at 09:03

## 2023-02-15 RX ADMIN — ATORVASTATIN CALCIUM 10 MG: 10 TABLET, FILM COATED ORAL at 21:47

## 2023-02-15 RX ADMIN — DICYCLOMINE HYDROCHLORIDE 10 MG: 10 CAPSULE ORAL at 21:47

## 2023-02-15 RX ADMIN — VERICIGUAT 2.5 MG: 2.5 TABLET, FILM COATED ORAL at 13:36

## 2023-02-15 RX ADMIN — BACLOFEN 30 MG: 10 TABLET ORAL at 12:12

## 2023-02-15 RX ADMIN — NYSTATIN: 100000 POWDER TOPICAL at 09:04

## 2023-02-15 RX ADMIN — DICYCLOMINE HYDROCHLORIDE 10 MG: 10 CAPSULE ORAL at 09:01

## 2023-02-15 RX ADMIN — BUMETANIDE 2 MG: 1 TABLET ORAL at 09:02

## 2023-02-15 RX ADMIN — Medication 1 CAPSULE: at 09:03

## 2023-02-15 RX ADMIN — PANTOPRAZOLE SODIUM 40 MG: 40 TABLET, DELAYED RELEASE ORAL at 21:47

## 2023-02-15 RX ADMIN — DULOXETINE HYDROCHLORIDE 60 MG: 60 CAPSULE, DELAYED RELEASE ORAL at 21:47

## 2023-02-15 RX ADMIN — BACLOFEN 30 MG: 10 TABLET ORAL at 17:31

## 2023-02-15 RX ADMIN — INSULIN DETEMIR 15 UNITS: 100 INJECTION, SOLUTION SUBCUTANEOUS at 21:47

## 2023-02-15 RX ADMIN — INSULIN ASPART 15 UNITS: 100 INJECTION, SOLUTION INTRAVENOUS; SUBCUTANEOUS at 17:56

## 2023-02-15 RX ADMIN — VITAMINS A AND D OINTMENT 1 APPLICATION: 15.5; 53.4 OINTMENT TOPICAL at 09:04

## 2023-02-15 RX ADMIN — CARVEDILOL 6.25 MG: 6.25 TABLET, FILM COATED ORAL at 09:02

## 2023-02-15 RX ADMIN — POTASSIUM CHLORIDE 40 MEQ: 1500 TABLET, EXTENDED RELEASE ORAL at 04:04

## 2023-02-15 RX ADMIN — BACLOFEN 30 MG: 10 TABLET ORAL at 21:47

## 2023-02-15 RX ADMIN — INSULIN ASPART 14 UNITS: 100 INJECTION, SOLUTION INTRAVENOUS; SUBCUTANEOUS at 12:11

## 2023-02-15 RX ADMIN — GABAPENTIN 800 MG: 400 CAPSULE ORAL at 09:01

## 2023-02-15 RX ADMIN — CARVEDILOL 6.25 MG: 6.25 TABLET, FILM COATED ORAL at 18:52

## 2023-02-15 RX ADMIN — INSULIN DETEMIR 15 UNITS: 100 INJECTION, SOLUTION SUBCUTANEOUS at 09:03

## 2023-02-15 NOTE — CASE MANAGEMENT/SOCIAL WORK
Discharge Planning Assessment   Fabricio     Patient Name: Ken Krueger  MRN: [id number]  Today's Date: 2/15/2023    Admit Date: 2/12/2023    Plan: SS was notified on this date that pt received a [facility] Consult. SS was notified by [facility] per Hollie who states pt does not have a PCU or CCU 3 night stay. SS followed up with pt at bedside on this date.  Pt resides at home [address] with family and plans to return home at discharge.  Pt currently utilizes [facility].  [facility] will need new referral with face to face at discharge.  Pt currently utilizes hospital bed, trapeze bar, patricio lift, wheelchair and electric wheelchair via [facility].  Pt's PCP is Franchesca Hodges.  Pt utilizes [facility].  Pt's brother Pineda is POA. SS will follow.   Discharge Plan     Row Name 02/15/23 1629       Plan    Plan SS was notified on this date that pt received a [facility] Consult. SS was notified by [facility] per Hollie who states pt does not have a PCU or CCU 3 night stay. SS followed up with pt at bedside on this date.  Pt resides at home [address] with family and plans to return home at discharge.  Pt currently utilizes [facility].  [facility] will need new referral with face to face at discharge.  Pt currently utilizes hospital bed, trapeze bar, patricio lift, wheelchair and electric wheelchair via [facility].  Pt's PCP is Franchesca Hodges.  Pt utilizes [facility].  Pt's brother Pineda is POA. SS will follow.                HUMPHREY Loya

## 2023-02-15 NOTE — PROGRESS NOTES
Pharmacokinetics Service Note:    Mr. Krueger continues on day 3 of 10 of vancomycin 1.25 gm q12hrs for his coccyx osteomyelitis.  An 11 hour post infusion level was reported as 9.3 mg/L this AM.  This correlates with an AUC ~ 354 mg/L.hr, which is lower than desired for bone/joint infections.  Will increase the dosage to 1.75 gm q12hrs to target an AUC closer to 500 mg/L.hr.  Will repeat a level in a few days to determine if any further adjustments are needed.    Thank you.  Sneha Harrell, Pharm.D.  2/15/2023  14:29 EST

## 2023-02-15 NOTE — PROGRESS NOTES
[facility]     Patient Identification:  Name:  Ken Krueger  Age:  47 y.o.  Sex:  male  :  1975  MRN:  [id number]  Visit Number:  [id number]  Primary Care Provider:  Franchesca Hodges APRN    Length of stay:  3    Subjective: Patient seen and examined, awake and alert, still hyperglycemic but show some improvement, preprandial insulin started today, so far tolerated ARNI, beta-blocker.  Bleeder was cauterized bedside yesterday as per staff, surgery and wound care help appreciated    Chief Complaint: Decubitus ulcer  ----------------------------------------------------------------------------------------------------------------------  Current Hospital Meds:  Acidophilus/Pectin, 1 capsule, Oral, Daily  apixaban, 5 mg, Oral, Q12H  ARIPiprazole, 10 mg, Oral, Nightly  ascorbic acid, 500 mg, Oral, Daily  atorvastatin, 10 mg, Oral, Nightly  baclofen, 30 mg, Oral, 4x Daily With Meals & Nightly  bumetanide, 2 mg, Oral, Daily  carvedilol, 6.25 mg, Oral, BID With Meals  cefTRIAXone, 2 g, Intravenous, Q24H  cholecalciferol, 1,000 Units, Oral, Daily  dicyclomine, 10 mg, Oral, BID  DULoxetine, 60 mg, Oral, BID  ferrous sulfate, 325 mg, Oral, Daily With Breakfast  gabapentin, 800 mg, Oral, TID  Insulin Aspart, 0-14 Units, Subcutaneous, TID AC  Insulin Aspart, 15 Units, Subcutaneous, TID With Meals  insulin detemir, 15 Units, Subcutaneous, Q12H  magic barrier cream, 1 application, Topical, BID  magnesium oxide, 1,200 mg, Oral, Daily  modafinil, 200 mg, Oral, Daily  multivitamin with minerals, 1 tablet, Oral, Daily  nystatin, , Topical, Q12H  pantoprazole, 40 mg, Oral, BID  sacubitril-valsartan, 1 tablet, Oral, Q12H  silver nitrate, 1 application, Topical, Once  sodium chloride, 10 mL, Intravenous, Q12H  spironolactone, 25 mg, Oral, Daily  sucralfate, 1 g, Oral, Daily  vancomycin, 1,250 mg, Intravenous, Q12H  Vericiguat, 2.5 mg, Oral, Daily      Pharmacy Consult - Pharmacy to dose,    Pharmacy Consult - Pharmacy to dose,       ----------------------------------------------------------------------------------------------------------------------  Vital Signs:  Temp:  [97.7 °F (36.5 °C)-98.9 °F (37.2 °C)] 98.3 °F (36.8 °C)  Heart Rate:  [73-96] 82  Resp:  [18-19] 18  BP: (120-137)/(69-79) 120/69       Tele: Sinus rhythm 85 beats per      02/13/23  0500 02/14/23  0500 02/15/23  0500   Weight: (!) 144 kg (317 lb 7.4 oz) (NEW ADMIT) (!) 143 kg (315 lb 3.2 oz) (!) 143 kg (314 lb 12.8 oz)     Body mass index is 43.91 kg/m².    Intake/Output Summary (Last 24 hours) at 2/15/2023 1259  Last data filed at 2/14/2023 1500  Gross per 24 hour   Intake --   Output 1000 ml   Net -1000 ml     Diet: Diabetic Diets; Consistent Carbohydrate; Texture: Regular Texture (IDDSI 7); Fluid Consistency: Thin (IDDSI 0)  ----------------------------------------------------------------------------------------------------------------------  Physical exam:  General: Obese comfortable,awake, alert, oriented to self, place, and time, well-developed and well-nourished.  No respiratory distress.    Skin:  Skin is warm and dry. No rash noted. No pallor.    HENT:  Head:  Normocephalic and atraumatic.  Mouth:  Moist mucous membranes.    Eyes:  Conjunctivae and EOM are normal.  Pupils are equal, round, and reactive to light.  No scleral icterus.    Neck:  Neck supple.  No JVD present.    Pulmonary/Chest:  No respiratory distress, no wheezes, no crackles, with normal breath sounds and good air movement.  Cardiovascular:  Normal rate, regular rhythm and normal heart sounds with no murmur.  Abdominal: Severely obese, left colostomy with greenish soft stools, right-sided ileostomy with clear elvin urine , soft.  Bowel sounds are normal.  No distension and no tenderness.   Extremities: Some atrophy of muscle bulk both lower extremity no edema, no tenderness, and no deformity.  No red or swollen joints anywhere.  Strong pulses in all 4  extremities with no clubbing, no cyanosis, no edema.  Neurological: Paraplegic approximate the level just above the  xiphoid   no cranial nerve deficit.  No tongue deviation.  No facial droop.  No slurred speech.    Genitourinary: Ileostomy  Back:  ----------------------------------------------------------------------------------------------------------------------  ----------------------------------------------------------------------------------------------------------------------      Results from last 7 days   Lab Units 02/13/23  1852 02/13/23  1215 02/13/23  0622 02/13/23  0302 02/12/23  2331 02/12/23  2106   CRP mg/dL  --   --   --   --   --  9.14*   LACTATE mmol/L 1.6 2.2* 2.6* 2.6*   < > 4.9*   WBC 10*3/mm3  --   --   --  13.35*  --  12.75*   HEMOGLOBIN g/dL  --   --   --  11.4*  --  12.6*   HEMATOCRIT %  --   --   --  37.6  --  41.4   MCV fL  --   --   --  86.4  --  87.9   MCHC g/dL  --   --   --  30.3*  --  30.4*   PLATELETS 10*3/mm3  --   --   --  322  --  439    < > = values in this interval not displayed.         Results from last 7 days   Lab Units 02/15/23  1153 02/15/23  0122 02/13/23  0302 02/12/23  2106   SODIUM mmol/L  --  133* 133* 131*   POTASSIUM mmol/L 4.0 3.2* 3.3* 3.3*   MAGNESIUM mg/dL  --   --  1.6  --    CHLORIDE mmol/L  --  96* 93* 87*   CO2 mmol/L  --  29.2* 23.3 28.6   BUN mg/dL  --  21* 31* 30*   CREATININE mg/dL  --  0.87 1.19 1.34*   CALCIUM mg/dL  --  8.7 8.4* 9.5   GLUCOSE mg/dL  --  398* 398* 519*   ALBUMIN g/dL  --   --  3.1* 3.6   BILIRUBIN mg/dL  --   --  0.3 0.2   ALK PHOS U/L  --   --  108 123*   AST (SGOT) U/L  --   --  36 48*   ALT (SGPT) U/L  --   --  33 42*   Estimated Creatinine Clearance: 154.7 mL/min (by C-G formula based on SCr of 0.87 mg/dL).    No results found for: AMMONIA      Blood Culture   Date Value Ref Range Status   02/12/2023 No growth at 2 days  Preliminary   02/12/2023 No growth at 2 days  Preliminary     No results found for: URINECX  No results found  for: WOUNDCX  No results found for: STOOLCX    I have personally looked at the labs and they are summarized above.  ----------------------------------------------------------------------------------------------------------------------  Imaging Results (Last 24 Hours)     ** No results found for the last 24 hours. **        ----------------------------------------------------------------------------------------------------------------------  Assessment and Plan:  -Sepsis present admission secondary to acute osteomyelitis of sacral decubitus ulcer  -Decubitus ulcer present on admission stage IV with acute osteomyelitis E. coli on tissue culture  -Diabetes type 2 with hyperglycemia  -Morbidly obese  -Obstructive sleep apnea on CPAP  -History of paraplegia status post colostomy status post ileostomy  -Hepatic steatosis  -History of PE and chronic anticoagulation  -History of nonischemic cardiomyopathy EF 31 to 25%    Antibiotic regimen care of infectious disease, Rocephin noted started, help appreciated, preprandial insulin started, further adjustments of basal insulin continue with CPAP, patient so far tolerated with Entresto, Aldactone, beta-blocker.  He will be started on discharge with SGLT2 inhibitor.  Recheck BMP in the morning, continued care consult requested    Disposition continue care consult      Yovana Pack MD  02/15/23  12:59 EST

## 2023-02-15 NOTE — PHARMACY PATIENT ASSISTANCE
Pharmacy evaluated the cost of Entresto for patient. Entresto is covered on patient's insurance with no copay.    Thank you,    Brionna Rossi, PharmD  02/15/23  14:00 EST

## 2023-02-15 NOTE — PLAN OF CARE
Goal Outcome Evaluation:      Pt is currently resting comfortably. No s/s of acute distress noted at this time. Pt has had no complaints this shift. However, pt's BG has been elevated all shift, communications has been made with MD Perez- see MAR. Will continue with plan of care.

## 2023-02-15 NOTE — PROGRESS NOTES
PROGRESS NOTE         Patient Identification:  Name:  Ken Krueger  Age:  47 y.o.  Sex:  male  :  1975  MRN:  [id number]  Visit Number:  [id number]  Primary Care Provider:  Franchesca Hodges APRN         LOS: 3 days       ----------------------------------------------------------------------------------------------------------------------  Subjective       Chief Complaints:    Wound Check        Interval History:      Patient is sitting up in bed on 3 L nasal cannula in no apparent distress.  Eating breakfast and appears comfortable.  No new issues or complaints at this time.  Nurse reported significant postoperative bleeding, which was cauterized yesterday.  Wound care team are considering a wound VAC.  Lungs are clear to auscultation bilaterally.  Abdomen is rounded, but soft, nontender, with normal bowel sounds.  No edema of right lower extremity.  Afebrile, no diarrhea.  No new labs today.  Buttock tissue culture on 2023 is so far showing E. coli.  Blood cultures on 2023 are so far showing no growth.    Review of Systems:    Constitutional: no fever, chills and night sweats.  No fatigue.  Eyes: no eye drainage, itching or redness.  HEENT: no mouth sores, dysphagia or nose bleed.  Respiratory: no for shortness of breath, cough or production of sputum.  Cardiovascular: no chest pain, no palpitations, no orthopnea.  Gastrointestinal: no nausea, vomiting or diarrhea. No abdominal pain, hematemesis or rectal bleeding.  Genitourinary: no dysuria or polyuria.  Hematologic/lymphatic: no lymph node abnormalities, no easy bruising or easy bleeding.  Musculoskeletal: no muscle or joint pain.  Skin: No rash and no itching.  Decubitus ulcers.  Neurological: no loss of consciousness, no seizure, no headache.  Behavioral/Psych: no depression or suicidal ideation.  Endocrine: no hot flashes.  Immunologic:  negative.    ----------------------------------------------------------------------------------------------------------------------      Objective       [facility] Meds:  Acidophilus/Pectin, 1 capsule, Oral, Daily  apixaban, 5 mg, Oral, Q12H  ARIPiprazole, 10 mg, Oral, Nightly  ascorbic acid, 500 mg, Oral, Daily  atorvastatin, 10 mg, Oral, Nightly  baclofen, 30 mg, Oral, 4x Daily With Meals & Nightly  bumetanide, 2 mg, Oral, Daily  carvedilol, 6.25 mg, Oral, BID With Meals  cefTRIAXone, 2 g, Intravenous, Q24H  cholecalciferol, 1,000 Units, Oral, Daily  dicyclomine, 10 mg, Oral, BID  DULoxetine, 60 mg, Oral, BID  ferrous sulfate, 325 mg, Oral, Daily With Breakfast  gabapentin, 800 mg, Oral, TID  Insulin Aspart, 0-14 Units, Subcutaneous, TID AC  Insulin Aspart, 15 Units, Subcutaneous, TID With Meals  insulin detemir, 15 Units, Subcutaneous, Q12H  magic barrier cream, 1 application, Topical, BID  magnesium oxide, 1,200 mg, Oral, Daily  modafinil, 200 mg, Oral, Daily  multivitamin with minerals, 1 tablet, Oral, Daily  nystatin, , Topical, Q12H  pantoprazole, 40 mg, Oral, BID  sacubitril-valsartan, 1 tablet, Oral, Q12H  silver nitrate, 1 application, Topical, Once  sodium chloride, 10 mL, Intravenous, Q12H  spironolactone, 25 mg, Oral, Daily  sucralfate, 1 g, Oral, Daily  vancomycin, 1,250 mg, Intravenous, Q12H  Vericiguat, 2.5 mg, Oral, Daily      Pharmacy Consult - Pharmacy to dose,   Pharmacy Consult - Pharmacy to dose,       ----------------------------------------------------------------------------------------------------------------------    Vital Signs:  Temp:  [97.7 °F (36.5 °C)-98.9 °F (37.2 °C)] 98.7 °F (37.1 °C)  Heart Rate:  [73-96] 73  Resp:  [18-19] 18  BP: (125-137)/(70-79) 135/79  Mean Arterial Pressure (Non-Invasive) for the past 24 hrs (Last 3 readings):   Noninvasive MAP (mmHg)   02/15/23 0636 95   02/15/23 0203 92   02/14/23 2216 94     SpO2 Percentage    02/14/23 2216 02/15/23 0203 02/15/23  0636   SpO2: 97% 98% 96%     SpO2:  [96 %-98 %] 96 %  on   ;   Device (Oxygen Therapy): nasal cannula    Body mass index is 43.91 kg/m².  Wt Readings from Last 3 Encounters:   02/15/23 (!) 143 kg (314 lb 12.8 oz)   12/13/22 (!) 141 kg (310 lb 14.4 oz)   12/02/22 (!) 150 kg (330 lb)        Intake/Output Summary (Last 24 hours) at 2/15/2023 1038  Last data filed at 2/14/2023 1500  Gross per 24 hour   Intake --   Output 2150 ml   Net -2150 ml     Diet: Diabetic Diets; Consistent Carbohydrate; Texture: Regular Texture (IDDSI 7); Fluid Consistency: Thin (IDDSI 0)  ----------------------------------------------------------------------------------------------------------------------      Physical Exam:    Constitutional: Very pleasant obese male is sitting up in bed on room air in no apparent distress.  Appears very comfortable.  Nurse is at bedside.  HENT:  Head: Normocephalic and atraumatic.  Mouth:  Moist mucous membranes.    Eyes:  Conjunctivae and EOM are normal.  No scleral icterus.  Neck:  Neck supple.  No JVD present.    Cardiovascular:  Normal rate, regular rhythm and normal heart sounds with no murmur. No edema.  Pulmonary/Chest:  No respiratory distress, no wheezes, no crackles, with normal breath sounds and good air movement.  Abdominal: Obese.  Soft.  Bowel sounds are normal.  No distension and no tenderness.  Ileal conduit and colostomy bag in place.  Musculoskeletal:  No edema and no tenderness.  No swelling or redness of joints.  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  Neurological:  Alert and oriented to person, place, and time.  No facial droop.  No slurred speech.   Skin:  Stage IV sacral decubitus wound status post excisional debridement.  Psychiatric:  Normal mood and affect.  Behavior is normal.      ----------------------------------------------------------------------------------------------------------------------            Results from last 7 days   Lab Units 02/13/23  1852 02/13/23  1215 02/13/23  0622  02/13/23 0302 02/12/23  2331 02/12/23 2106   CRP mg/dL  --   --   --   --   --  9.14*   LACTATE mmol/L 1.6 2.2* 2.6* 2.6*   < > 4.9*   WBC 10*3/mm3  --   --   --  13.35*  --  12.75*   HEMOGLOBIN g/dL  --   --   --  11.4*  --  12.6*   HEMATOCRIT %  --   --   --  37.6  --  41.4   MCV fL  --   --   --  86.4  --  87.9   MCHC g/dL  --   --   --  30.3*  --  30.4*   PLATELETS 10*3/mm3  --   --   --  322  --  439    < > = values in this interval not displayed.     Results from last 7 days   Lab Units 02/15/23  0122 02/13/23 0302 02/12/23 2106   SODIUM mmol/L 133* 133* 131*   POTASSIUM mmol/L 3.2* 3.3* 3.3*   MAGNESIUM mg/dL  --  1.6  --    CHLORIDE mmol/L 96* 93* 87*   CO2 mmol/L 29.2* 23.3 28.6   BUN mg/dL 21* 31* 30*   CREATININE mg/dL 0.87 1.19 1.34*   CALCIUM mg/dL 8.7 8.4* 9.5   GLUCOSE mg/dL 398* 398* 519*   ALBUMIN g/dL  --  3.1* 3.6   BILIRUBIN mg/dL  --  0.3 0.2   ALK PHOS U/L  --  108 123*   AST (SGOT) U/L  --  36 48*   ALT (SGPT) U/L  --  33 42*   Estimated Creatinine Clearance: 154.7 mL/min (by C-G formula based on SCr of 0.87 mg/dL).  No results found for: AMMONIA    Hemoglobin A1C   Date/Time Value Ref Range Status   02/13/2023 0302 11.50 (H) 4.80 - 5.60 % Final     Glucose   Date/Time Value Ref Range Status   02/15/2023 0635 396 (H) 70 - 130 mg/dL Final     Comment:     Meter: [id number] : [id number] JESSICA AQUINO   02/15/2023 0406 347 (H) 70 - 130 mg/dL Final     Comment:     Meter: [id number] : [id number] alvin ramiro   02/15/2023 0004 396 (H) 70 - 130 mg/dL Final     Comment:     Meter: [id number] : [id number] alvin autumn 02/14/2023 2219 441 (C) 70 - 130 mg/dL Final     Comment:     RN Notified Meter: [id number] : [id number] DAGMRA KEMP   02/14/2023 2102 484 (C) 70 - 130 mg/dL Final     Comment:     RN Notified Meter: [id number] : [id number] DAGMAR KEMP   02/14/2023 1936 488 (C) 70 - 130 mg/dL Final     Comment:     RN Notified Meter: [id number] : [id number] DAGMAR  VIRIDIANA   02/14/2023 1710 509 (C) 70 - 130 mg/dL Final     Comment:     RN Notified Meter: [id number] : [id number] JESSICA AQUINO   02/14/2023 1025 523 (C) 70 - 130 mg/dL Final     Comment:     Confirmed by Repeat Meter: [id number] : [id number] JESSICA AQUINO     Lab Results   Component Value Date    HGBA1C 11.50 (H) 02/13/2023     Lab Results   Component Value Date    TSH 3.780 07/19/2022    FREET4 1.31 06/02/2021       Blood Culture   Date Value Ref Range Status   02/12/2023 No growth at 24 hours  Preliminary   02/12/2023 No growth at 24 hours  Preliminary     No results found for: URINECX  No results found for: WOUNDCX  No results found for: STOOLCX  No results found for: RESPCX  Pain Management Panel       Pain Management Panel Latest Ref Rng & Units 6/2/2021 8/19/2018    AMPHETAMINES SCREEN, URINE Negative Negative Negative    BARBITURATES SCREEN Negative Negative Negative    BENZODIAZEPINE SCREEN, URINE Negative Negative Negative    BUPRENORPHINEUR Negative Negative Negative    COCAINE SCREEN, URINE Negative Negative Negative    METHADONE SCREEN, URINE Negative Negative Negative              ----------------------------------------------------------------------------------------------------------------------  Imaging Results (Last 24 Hours)       ** No results found for the last 24 hours. **            -----------------------------------------------------------------------------------  Pertinent Infectious Disease Results     CT abdomen pelvis with contrast on 2/12/2023 showed there is a new decubitus ulcer just to the right of midline at the level of the coccyx with constellation of imaging findings most characteristic of acute infection/osteomyelitis. Small pocket of air and fluid adjacent to the coccyx measuring 3.7 x 4.1 cm could reflect phlegmon or abscess. Chronic bilateral decubitus ulcers at the level of the ischial tuberosities bilaterally with imaging findings suggestive of chronic  infection/osteomyelitis, similar to prior. Hepatic steatosis and hepatomegaly with borderline splenomegaly. Nonobstructing left renal stones. Postoperative changes of left lower quadrant and colostomy and right mid abdominal ileal conduit formation. Diverticulosis. No diverticulitis or bowel obstruction. COVID-19 and flu A/B PCR on 2/12/2023 was negative.    Status post excisional debridement of sacral ulcer on 2/13/2023 with Dr. Taylor.       Assessment/Plan         ASSESSMENT:    Septic shock with lactic acid greater than 4 on admission  Acute and chronic decubitus ulcerations with osteomyelitis and possible abscess      PLAN:    I have seen and examined the patient myself this morning and discussed the case with Christy Caro PA-C and discussed overnight changes with primary RN here are my findings:    Patient sitting up in bed on 3 L nasal cannula with no apparent distress.  Eating breakfast and appears to be comfortable.  No issues or complaints at this time.  Nurse reported significant postoperative bleeding which had to be cauterized yesterday.  Wound care team is considering wound VAC and Cathie is following.    Exam otherwise is fairly benign with lungs are clear to auscultation abdomen is distended rounded but nontender soft with no guarding.  No edema to the right lower extremity.    No fever or diarrhea reported and no new labs today.  Buttock tissue culture on 2/13/2023 so far showing E. coli and blood cultures from 2/12/2023 are so far showing no growth.    Based on culture results I discontinued cefepime and initiated course with ceftriaxone to continue along with vancomycin pharmacy to dose for now while awaiting for finalization of culture results.    Code Status:   Code Status and Medical Interventions:   Ordered at: 02/12/23 9362     Code Status (Patient has no pulse and is not breathing):    CPR (Attempt to Resuscitate)     Medical Interventions (Patient has pulse or is breathing):    Full  Support       Christy Caro PA-C  02/15/23  10:38 EST    Electronically signed by Christy Caro PA-C, 02/15/23, 10:41 AM EST.    Electronically signed by Tra Jain MD, 02/15/23, 10:59 AM EST.

## 2023-02-15 NOTE — PLAN OF CARE
Goal Outcome Evaluation:      Patient has been resting in bed this shift. No s/s of acute distress noted at this time. Will continue to follow plan of care.

## 2023-02-15 NOTE — CONSULTS
Diabetes Education    Patient Name:  Ken Krueger  YOB: 1975  MRN: [id number]  Admit Date:  2/12/2023        A1C 11.5. Patient was seen on 12/13/22 where he received education from ADA handouts. Patient stated he still had the information and did not need no information at this time. Office contact information was left with patient.  If any questions or concerns please re-consult or call. Thank you      Electronically signed by:  Cary Kaur RN  02/15/23 12:31 EST

## 2023-02-16 LAB
ANION GAP SERPL CALCULATED.3IONS-SCNC: 11 MMOL/L (ref 5–15)
BACTERIA SPEC AEROBE CULT: ABNORMAL
BACTERIA SPEC AEROBE CULT: ABNORMAL
BASOPHILS # BLD AUTO: 0.03 10*3/MM3 (ref 0–0.2)
BASOPHILS NFR BLD AUTO: 0.4 % (ref 0–1.5)
BUN SERPL-MCNC: 21 MG/DL (ref 6–20)
BUN/CREAT SERPL: 24.4 (ref 7–25)
CALCIUM SPEC-SCNC: 8.5 MG/DL (ref 8.6–10.5)
CHLORIDE SERPL-SCNC: 97 MMOL/L (ref 98–107)
CO2 SERPL-SCNC: 25 MMOL/L (ref 22–29)
CREAT SERPL-MCNC: 0.86 MG/DL (ref 0.76–1.27)
DEPRECATED RDW RBC AUTO: 45.6 FL (ref 37–54)
EGFRCR SERPLBLD CKD-EPI 2021: 108.8 ML/MIN/1.73
EOSINOPHIL # BLD AUTO: 0.2 10*3/MM3 (ref 0–0.4)
EOSINOPHIL NFR BLD AUTO: 2.4 % (ref 0.3–6.2)
ERYTHROCYTE [DISTWIDTH] IN BLOOD BY AUTOMATED COUNT: 13.5 % (ref 12.3–15.4)
GLUCOSE BLDC GLUCOMTR-MCNC: 433 MG/DL (ref 70–130)
GLUCOSE BLDC GLUCOMTR-MCNC: 448 MG/DL (ref 70–130)
GLUCOSE BLDC GLUCOMTR-MCNC: 488 MG/DL (ref 70–130)
GLUCOSE BLDC GLUCOMTR-MCNC: 511 MG/DL (ref 70–130)
GLUCOSE BLDC GLUCOMTR-MCNC: 553 MG/DL (ref 70–130)
GLUCOSE BLDC GLUCOMTR-MCNC: 562 MG/DL (ref 70–130)
GLUCOSE SERPL-MCNC: 536 MG/DL (ref 65–99)
GRAM STN SPEC: ABNORMAL
HCT VFR BLD AUTO: 36.1 % (ref 37.5–51)
HGB BLD-MCNC: 10.6 G/DL (ref 13–17.7)
IMM GRANULOCYTES # BLD AUTO: 0.04 10*3/MM3 (ref 0–0.05)
IMM GRANULOCYTES NFR BLD AUTO: 0.5 % (ref 0–0.5)
LYMPHOCYTES # BLD AUTO: 1.42 10*3/MM3 (ref 0.7–3.1)
LYMPHOCYTES NFR BLD AUTO: 17.1 % (ref 19.6–45.3)
MCH RBC QN AUTO: 26.7 PG (ref 26.6–33)
MCHC RBC AUTO-ENTMCNC: 29.4 G/DL (ref 31.5–35.7)
MCV RBC AUTO: 90.9 FL (ref 79–97)
MONOCYTES # BLD AUTO: 0.28 10*3/MM3 (ref 0.1–0.9)
MONOCYTES NFR BLD AUTO: 3.4 % (ref 5–12)
NEUTROPHILS NFR BLD AUTO: 6.31 10*3/MM3 (ref 1.7–7)
NEUTROPHILS NFR BLD AUTO: 76.2 % (ref 42.7–76)
NRBC BLD AUTO-RTO: 0 /100 WBC (ref 0–0.2)
PLATELET # BLD AUTO: 337 10*3/MM3 (ref 140–450)
PMV BLD AUTO: 9.8 FL (ref 6–12)
POTASSIUM SERPL-SCNC: 3.9 MMOL/L (ref 3.5–5.2)
RBC # BLD AUTO: 3.97 10*6/MM3 (ref 4.14–5.8)
SODIUM SERPL-SCNC: 133 MMOL/L (ref 136–145)
WBC NRBC COR # BLD: 8.28 10*3/MM3 (ref 3.4–10.8)

## 2023-02-16 PROCEDURE — 63710000001 INSULIN DETEMIR PER 5 UNITS: Performed by: HOSPITALIST

## 2023-02-16 PROCEDURE — 25010000002 VANCOMYCIN 5 G RECONSTITUTED SOLUTION: Performed by: INTERNAL MEDICINE

## 2023-02-16 PROCEDURE — 80048 BASIC METABOLIC PNL TOTAL CA: CPT | Performed by: HOSPITALIST

## 2023-02-16 PROCEDURE — 85025 COMPLETE CBC W/AUTO DIFF WBC: CPT | Performed by: HOSPITALIST

## 2023-02-16 PROCEDURE — 63710000001 INSULIN ASPART PER 5 UNITS: Performed by: HOSPITALIST

## 2023-02-16 PROCEDURE — 25010000002 CEFTRIAXONE PER 250 MG: Performed by: INTERNAL MEDICINE

## 2023-02-16 PROCEDURE — 97162 PT EVAL MOD COMPLEX 30 MIN: CPT

## 2023-02-16 PROCEDURE — 99232 SBSQ HOSP IP/OBS MODERATE 35: CPT | Performed by: NURSE PRACTITIONER

## 2023-02-16 PROCEDURE — 82962 GLUCOSE BLOOD TEST: CPT

## 2023-02-16 PROCEDURE — 63710000001 INSULIN ASPART PER 5 UNITS

## 2023-02-16 PROCEDURE — 63710000001 INSULIN ASPART PER 5 UNITS: Performed by: INTERNAL MEDICINE

## 2023-02-16 PROCEDURE — 99232 SBSQ HOSP IP/OBS MODERATE 35: CPT | Performed by: HOSPITALIST

## 2023-02-16 RX ORDER — INSULIN ASPART 100 [IU]/ML
10 INJECTION, SOLUTION INTRAVENOUS; SUBCUTANEOUS ONCE
Status: COMPLETED | OUTPATIENT
Start: 2023-02-17 | End: 2023-02-17

## 2023-02-16 RX ORDER — INSULIN ASPART 100 [IU]/ML
25 INJECTION, SOLUTION INTRAVENOUS; SUBCUTANEOUS ONCE
Status: COMPLETED | OUTPATIENT
Start: 2023-02-16 | End: 2023-02-16

## 2023-02-16 RX ORDER — DEXTROSE MONOHYDRATE 25 G/50ML
25 INJECTION, SOLUTION INTRAVENOUS
Status: DISCONTINUED | OUTPATIENT
Start: 2023-02-16 | End: 2023-02-21 | Stop reason: HOSPADM

## 2023-02-16 RX ORDER — NICOTINE POLACRILEX 4 MG
15 LOZENGE BUCCAL
Status: DISCONTINUED | OUTPATIENT
Start: 2023-02-16 | End: 2023-02-21 | Stop reason: HOSPADM

## 2023-02-16 RX ORDER — INSULIN ASPART 100 [IU]/ML
15 INJECTION, SOLUTION INTRAVENOUS; SUBCUTANEOUS ONCE
Status: COMPLETED | OUTPATIENT
Start: 2023-02-16 | End: 2023-02-16

## 2023-02-16 RX ORDER — INSULIN ASPART 100 [IU]/ML
20 INJECTION, SOLUTION INTRAVENOUS; SUBCUTANEOUS
Status: DISCONTINUED | OUTPATIENT
Start: 2023-02-16 | End: 2023-02-16

## 2023-02-16 RX ORDER — INSULIN ASPART 100 [IU]/ML
0-14 INJECTION, SOLUTION INTRAVENOUS; SUBCUTANEOUS
Status: DISCONTINUED | OUTPATIENT
Start: 2023-02-16 | End: 2023-02-21 | Stop reason: HOSPADM

## 2023-02-16 RX ORDER — INSULIN ASPART 100 [IU]/ML
0-14 INJECTION, SOLUTION INTRAVENOUS; SUBCUTANEOUS
Status: DISCONTINUED | OUTPATIENT
Start: 2023-02-16 | End: 2023-02-16 | Stop reason: SDUPTHER

## 2023-02-16 RX ORDER — INSULIN ASPART 100 [IU]/ML
25 INJECTION, SOLUTION INTRAVENOUS; SUBCUTANEOUS
Status: DISCONTINUED | OUTPATIENT
Start: 2023-02-16 | End: 2023-02-19

## 2023-02-16 RX ADMIN — Medication 10 ML: at 21:33

## 2023-02-16 RX ADMIN — MAGNESIUM GLUCONATE 500 MG ORAL TABLET 1200 MG: 500 TABLET ORAL at 08:42

## 2023-02-16 RX ADMIN — DULOXETINE HYDROCHLORIDE 60 MG: 60 CAPSULE, DELAYED RELEASE ORAL at 21:32

## 2023-02-16 RX ADMIN — CARVEDILOL 6.25 MG: 6.25 TABLET, FILM COATED ORAL at 08:42

## 2023-02-16 RX ADMIN — INSULIN ASPART 15 UNITS: 100 INJECTION, SOLUTION INTRAVENOUS; SUBCUTANEOUS at 03:11

## 2023-02-16 RX ADMIN — APIXABAN 5 MG: 5 TABLET, FILM COATED ORAL at 08:43

## 2023-02-16 RX ADMIN — SACUBITRIL AND VALSARTAN 1 TABLET: 24; 26 TABLET, FILM COATED ORAL at 08:43

## 2023-02-16 RX ADMIN — GABAPENTIN 800 MG: 400 CAPSULE ORAL at 08:42

## 2023-02-16 RX ADMIN — INSULIN ASPART 20 UNITS: 100 INJECTION, SOLUTION INTRAVENOUS; SUBCUTANEOUS at 08:41

## 2023-02-16 RX ADMIN — Medication 1000 UNITS: at 08:42

## 2023-02-16 RX ADMIN — INSULIN ASPART 25 UNITS: 100 INJECTION, SOLUTION INTRAVENOUS; SUBCUTANEOUS at 22:27

## 2023-02-16 RX ADMIN — CEFTRIAXONE 2 G: 2 INJECTION, POWDER, FOR SOLUTION INTRAMUSCULAR; INTRAVENOUS at 08:37

## 2023-02-16 RX ADMIN — INSULIN ASPART 7 UNITS: 100 INJECTION, SOLUTION INTRAVENOUS; SUBCUTANEOUS at 00:11

## 2023-02-16 RX ADMIN — OXYCODONE HYDROCHLORIDE AND ACETAMINOPHEN 500 MG: 500 TABLET ORAL at 08:42

## 2023-02-16 RX ADMIN — SPIRONOLACTONE 25 MG: 25 TABLET ORAL at 08:43

## 2023-02-16 RX ADMIN — GABAPENTIN 800 MG: 400 CAPSULE ORAL at 17:09

## 2023-02-16 RX ADMIN — INSULIN ASPART 14 UNITS: 100 INJECTION, SOLUTION INTRAVENOUS; SUBCUTANEOUS at 12:29

## 2023-02-16 RX ADMIN — APIXABAN 5 MG: 5 TABLET, FILM COATED ORAL at 21:32

## 2023-02-16 RX ADMIN — INSULIN DETEMIR 40 UNITS: 100 INJECTION, SOLUTION SUBCUTANEOUS at 21:32

## 2023-02-16 RX ADMIN — INSULIN ASPART 25 UNITS: 100 INJECTION, SOLUTION INTRAVENOUS; SUBCUTANEOUS at 12:28

## 2023-02-16 RX ADMIN — VITAMINS A AND D OINTMENT 1 APPLICATION: 15.5; 53.4 OINTMENT TOPICAL at 08:36

## 2023-02-16 RX ADMIN — INSULIN ASPART 25 UNITS: 100 INJECTION, SOLUTION INTRAVENOUS; SUBCUTANEOUS at 17:10

## 2023-02-16 RX ADMIN — BACLOFEN 30 MG: 10 TABLET ORAL at 17:10

## 2023-02-16 RX ADMIN — CARVEDILOL 6.25 MG: 6.25 TABLET, FILM COATED ORAL at 17:10

## 2023-02-16 RX ADMIN — Medication 10 ML: at 08:45

## 2023-02-16 RX ADMIN — NYSTATIN: 100000 POWDER TOPICAL at 08:37

## 2023-02-16 RX ADMIN — PANTOPRAZOLE SODIUM 40 MG: 40 TABLET, DELAYED RELEASE ORAL at 21:32

## 2023-02-16 RX ADMIN — VITAMINS A AND D OINTMENT 1 APPLICATION: 15.5; 53.4 OINTMENT TOPICAL at 21:35

## 2023-02-16 RX ADMIN — INSULIN DETEMIR 15 UNITS: 100 INJECTION, SOLUTION SUBCUTANEOUS at 12:29

## 2023-02-16 RX ADMIN — VANCOMYCIN HYDROCHLORIDE 1750 MG: 5 INJECTION, POWDER, LYOPHILIZED, FOR SOLUTION INTRAVENOUS at 01:17

## 2023-02-16 RX ADMIN — DICYCLOMINE HYDROCHLORIDE 10 MG: 10 CAPSULE ORAL at 08:42

## 2023-02-16 RX ADMIN — BACLOFEN 30 MG: 10 TABLET ORAL at 21:32

## 2023-02-16 RX ADMIN — ARIPIPRAZOLE 10 MG: 10 TABLET ORAL at 21:32

## 2023-02-16 RX ADMIN — INSULIN ASPART 14 UNITS: 100 INJECTION, SOLUTION INTRAVENOUS; SUBCUTANEOUS at 09:43

## 2023-02-16 RX ADMIN — DICYCLOMINE HYDROCHLORIDE 10 MG: 10 CAPSULE ORAL at 21:32

## 2023-02-16 RX ADMIN — GABAPENTIN 800 MG: 400 CAPSULE ORAL at 21:32

## 2023-02-16 RX ADMIN — MODAFINIL 200 MG: 100 TABLET ORAL at 09:43

## 2023-02-16 RX ADMIN — VANCOMYCIN HYDROCHLORIDE 1750 MG: 5 INJECTION, POWDER, LYOPHILIZED, FOR SOLUTION INTRAVENOUS at 12:32

## 2023-02-16 RX ADMIN — VERICIGUAT 2.5 MG: 2.5 TABLET, FILM COATED ORAL at 08:38

## 2023-02-16 RX ADMIN — NYSTATIN: 100000 POWDER TOPICAL at 21:32

## 2023-02-16 RX ADMIN — Medication 1 TABLET: at 08:43

## 2023-02-16 RX ADMIN — BUMETANIDE 2 MG: 1 TABLET ORAL at 08:43

## 2023-02-16 RX ADMIN — BACLOFEN 30 MG: 10 TABLET ORAL at 08:42

## 2023-02-16 RX ADMIN — INSULIN ASPART 14 UNITS: 100 INJECTION, SOLUTION INTRAVENOUS; SUBCUTANEOUS at 17:11

## 2023-02-16 RX ADMIN — Medication 1 CAPSULE: at 08:43

## 2023-02-16 RX ADMIN — INSULIN DETEMIR 10 UNITS: 100 INJECTION, SOLUTION SUBCUTANEOUS at 03:11

## 2023-02-16 RX ADMIN — SUCRALFATE 1 G: 1 TABLET ORAL at 08:43

## 2023-02-16 RX ADMIN — DULOXETINE HYDROCHLORIDE 60 MG: 60 CAPSULE, DELAYED RELEASE ORAL at 08:42

## 2023-02-16 RX ADMIN — BACLOFEN 30 MG: 10 TABLET ORAL at 12:30

## 2023-02-16 RX ADMIN — FERROUS SULFATE TAB 325 MG (65 MG ELEMENTAL FE) 325 MG: 325 (65 FE) TAB at 08:43

## 2023-02-16 RX ADMIN — INSULIN DETEMIR 25 UNITS: 100 INJECTION, SOLUTION SUBCUTANEOUS at 09:43

## 2023-02-16 RX ADMIN — SACUBITRIL AND VALSARTAN 1 TABLET: 24; 26 TABLET, FILM COATED ORAL at 21:32

## 2023-02-16 RX ADMIN — ATORVASTATIN CALCIUM 10 MG: 10 TABLET, FILM COATED ORAL at 21:32

## 2023-02-16 RX ADMIN — PANTOPRAZOLE SODIUM 40 MG: 40 TABLET, DELAYED RELEASE ORAL at 08:42

## 2023-02-16 NOTE — PROGRESS NOTES
PROGRESS NOTE         Patient Identification:  Name:  Ken Krueger  Age:  47 y.o.  Sex:  male  :  1975  MRN:  [id number]  Visit Number:  [id number]  Primary Care Provider:  Franchesca Hodges APRN         LOS: 4 days       ----------------------------------------------------------------------------------------------------------------------  Subjective       Chief Complaints:    Wound Check        Interval History:      Patient sitting up in bed this morning eating breakfast.  No issues or complaints.  Afebrile, no increase in ostomy output.  WBC normal at 8.28.  Bone and tissue culture from 2023 finalized as E. coli.  Lungs clear to auscultation bilaterally.    Review of Systems:    Constitutional: no fever, chills and night sweats.  No fatigue.  Eyes: no eye drainage, itching or redness.  HEENT: no mouth sores, dysphagia or nose bleed.  Respiratory: no for shortness of breath, cough or production of sputum.  Cardiovascular: no chest pain, no palpitations, no orthopnea.  Gastrointestinal: no nausea, vomiting or diarrhea. No abdominal pain, hematemesis or rectal bleeding.  Genitourinary: no dysuria or polyuria.  Hematologic/lymphatic: no lymph node abnormalities, no easy bruising or easy bleeding.  Musculoskeletal: no muscle or joint pain.  Skin: No rash and no itching.  Decubitus ulcers.  Neurological: no loss of consciousness, no seizure, no headache.  Behavioral/Psych: no depression or suicidal ideation.  Endocrine: no hot flashes.  Immunologic: negative.    ----------------------------------------------------------------------------------------------------------------------      Objective       \[facility]\"" Meds:  Acidophilus/Pectin, 1 capsule, Oral, Daily  apixaban, 5 mg, Oral, Q12H  ARIPiprazole, 10 mg, Oral, Nightly  ascorbic acid, 500 mg, Oral, Daily  atorvastatin, 10 mg, Oral, Nightly  baclofen, 30 mg, Oral, 4x Daily With Meals & Nightly  bumetanide, 2 mg, Oral, Daily  carvedilol,  6.25 mg, Oral, BID With Meals  cefTRIAXone, 2 g, Intravenous, Q24H  cholecalciferol, 1,000 Units, Oral, Daily  dicyclomine, 10 mg, Oral, BID  DULoxetine, 60 mg, Oral, BID  ferrous sulfate, 325 mg, Oral, Daily With Breakfast  gabapentin, 800 mg, Oral, TID  Insulin Aspart, 0-14 Units, Subcutaneous, TID AC  Insulin Aspart, 25 Units, Subcutaneous, TID With Meals  insulin detemir, 40 Units, Subcutaneous, Q12H  magic barrier cream, 1 application, Topical, BID  magnesium oxide, 1,200 mg, Oral, Daily  modafinil, 200 mg, Oral, Daily  multivitamin with minerals, 1 tablet, Oral, Daily  nystatin, , Topical, Q12H  pantoprazole, 40 mg, Oral, BID  sacubitril-valsartan, 1 tablet, Oral, Q12H  silver nitrate, 1 application, Topical, Once  sodium chloride, 10 mL, Intravenous, Q12H  spironolactone, 25 mg, Oral, Daily  sucralfate, 1 g, Oral, Daily  vancomycin, 1,750 mg, Intravenous, Q12H  Vericiguat, 2.5 mg, Oral, Daily      Pharmacy Consult - Pharmacy to dose,   Pharmacy Consult - Pharmacy to dose,       ----------------------------------------------------------------------------------------------------------------------    Vital Signs:  Temp:  [97.2 °F (36.2 °C)-98.3 °F (36.8 °C)] 98.2 °F (36.8 °C)  Heart Rate:  [80-89] 82  Resp:  [16-18] 16  BP: (108-123)/(63-89) 122/69  Mean Arterial Pressure (Non-Invasive) for the past 24 hrs (Last 3 readings):   Noninvasive MAP (mmHg)   02/16/23 1000 87   02/16/23 0600 81   02/16/23 0227 84     SpO2 Percentage    02/16/23 0227 02/16/23 0600 02/16/23 1000   SpO2: 98% 94% 92%     SpO2:  [92 %-99 %] 92 %  on  Flow (L/min):  [3] 3;   Device (Oxygen Therapy): nasal cannula    Body mass index is 42.66 kg/m².  Wt Readings from Last 3 Encounters:   02/16/23 (!) 139 kg (305 lb 14.4 oz)   12/13/22 (!) 141 kg (310 lb 14.4 oz)   12/02/22 (!) 150 kg (330 lb)        Intake/Output Summary (Last 24 hours) at 2/16/2023 1310  Last data filed at 2/16/2023 1232  Gross per 24 hour   Intake 2360 ml   Output 3800 ml    Net -1440 ml     Diet: Diabetic Diets; Consistent Carbohydrate; Texture: Regular Texture (IDDSI 7); Fluid Consistency: Thin (IDDSI 0)  ----------------------------------------------------------------------------------------------------------------------      Physical Exam:    Constitutional: Very pleasant obese male is sitting up in bed on room air in no apparent distress.  Appears very comfortable.    HENT:  Head: Normocephalic and atraumatic.  Mouth:  Moist mucous membranes.    Eyes:  Conjunctivae and EOM are normal.  No scleral icterus.  Neck:  Neck supple.  No JVD present.    Cardiovascular:  Normal rate, regular rhythm and normal heart sounds with no murmur. No edema.  Pulmonary/Chest:  No respiratory distress, no wheezes, no crackles, with normal breath sounds and good air movement.  Abdominal: Obese.  Soft.  Bowel sounds are normal.  No distension and no tenderness.  Ileal conduit and colostomy bag in place.  Musculoskeletal:  No edema and no tenderness.  No swelling or redness of joints.  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  Neurological:  Alert and oriented to person, place, and time.  No facial droop.  No slurred speech.   Skin:  Stage IV sacral decubitus wound status post excisional debridement.  Psychiatric:  Normal mood and affect.  Behavior is normal.      ----------------------------------------------------------------------------------------------------------------------            Results from last 7 days   Lab Units 02/16/23  0056 02/13/23  1852 02/13/23  1215 02/13/23  0622 02/13/23  0302 02/12/23  2331 02/12/23  2106   CRP mg/dL  --   --   --   --   --   --  9.14*   LACTATE mmol/L  --  1.6 2.2* 2.6* 2.6*   < > 4.9*   WBC 10*3/mm3 8.28  --   --   --  13.35*  --  12.75*   HEMOGLOBIN g/dL 10.6*  --   --   --  11.4*  --  12.6*   HEMATOCRIT % 36.1*  --   --   --  37.6  --  41.4   MCV fL 90.9  --   --   --  86.4  --  87.9   MCHC g/dL 29.4*  --   --   --  30.3*  --  30.4*   PLATELETS 10*3/mm3 337  --   --   --   322  --  439    < > = values in this interval not displayed.     Results from last 7 days   Lab Units 02/16/23  0056 02/15/23  1153 02/15/23  0122 02/13/23  0302 02/12/23  2106   SODIUM mmol/L 133*  --  133* 133* 131*   POTASSIUM mmol/L 3.9 4.0 3.2* 3.3* 3.3*   MAGNESIUM mg/dL  --   --   --  1.6  --    CHLORIDE mmol/L 97*  --  96* 93* 87*   CO2 mmol/L 25.0  --  29.2* 23.3 28.6   BUN mg/dL 21*  --  21* 31* 30*   CREATININE mg/dL 0.86  --  0.87 1.19 1.34*   CALCIUM mg/dL 8.5*  --  8.7 8.4* 9.5   GLUCOSE mg/dL 536*  --  398* 398* 519*   ALBUMIN g/dL  --   --   --  3.1* 3.6   BILIRUBIN mg/dL  --   --   --  0.3 0.2   ALK PHOS U/L  --   --   --  108 123*   AST (SGOT) U/L  --   --   --  36 48*   ALT (SGPT) U/L  --   --   --  33 42*   Estimated Creatinine Clearance: 155 mL/min (by C-G formula based on SCr of 0.86 mg/dL).  No results found for: AMMONIA    Glucose   Date/Time Value Ref Range Status   02/16/2023 0951 488 (C) 70 - 130 mg/dL Final     Comment:     RN Notified Meter: [id number] : [id number] CLAUDE MCDANIEL   02/16/2023 0718 448 (C) 70 - 130 mg/dL Final     Comment:     Confirmed by Repeat RN Notified Meter: [id number] : [id number] CLAUDE MCDANIEL   02/16/2023 0249 553 (C) 70 - 130 mg/dL Final     Comment:     RN Notified Meter: [id number] : [id number] JUAN LUIS BENOIT   02/15/2023 2156 425 (C) 70 - 130 mg/dL Final     Comment:     Result Not Confirmed Meter: [id number] : [id number] NEIL SANCHEZ   02/15/2023 1836 423 (C) 70 - 130 mg/dL Final     Comment:     RN Notified Meter: [id number] : [id number] JAYMIE FALCON   02/15/2023 1656 412 (C) 70 - 130 mg/dL Final     Comment:     RN Notified Meter: [id number] : [id number] JESSICA AQUINO   02/15/2023 1136 430 (C) 70 - 130 mg/dL Final     Comment:     RN Notified Meter: [id number] : [id number] JESSICA AQUINO   02/15/2023 0635 396 (H) 70 - 130 mg/dL Final     Comment:     Meter: [id number] : [id number] JESSICA AQUINO     Lab Results    Component Value Date    HGBA1C 11.50 (H) 02/13/2023     Lab Results   Component Value Date    TSH 3.780 07/19/2022    FREET4 1.31 06/02/2021       Blood Culture   Date Value Ref Range Status   02/12/2023 No growth at 24 hours  Preliminary   02/12/2023 No growth at 24 hours  Preliminary     No results found for: URINECX  No results found for: WOUNDCX  No results found for: STOOLCX  No results found for: RESPCX  Pain Management Panel     Pain Management Panel Latest Ref Rng & Units 6/2/2021 8/19/2018    AMPHETAMINES SCREEN, URINE Negative Negative Negative    BARBITURATES SCREEN Negative Negative Negative    BENZODIAZEPINE SCREEN, URINE Negative Negative Negative    BUPRENORPHINEUR Negative Negative Negative    COCAINE SCREEN, URINE Negative Negative Negative    METHADONE SCREEN, URINE Negative Negative Negative            ----------------------------------------------------------------------------------------------------------------------  Imaging Results (Last 24 Hours)     ** No results found for the last 24 hours. **          -----------------------------------------------------------------------------------  Pertinent Infectious Disease Results     CT abdomen pelvis with contrast on 2/12/2023 showed there is a new decubitus ulcer just to the right of midline at the level of the coccyx with constellation of imaging findings most characteristic of acute infection/osteomyelitis. Small pocket of air and fluid adjacent to the coccyx measuring 3.7 x 4.1 cm could reflect phlegmon or abscess. Chronic bilateral decubitus ulcers at the level of the ischial tuberosities bilaterally with imaging findings suggestive of chronic infection/osteomyelitis, similar to prior. Hepatic steatosis and hepatomegaly with borderline splenomegaly. Nonobstructing left renal stones. Postoperative changes of left lower quadrant and colostomy and right mid abdominal ileal conduit formation. Diverticulosis. No diverticulitis or bowel  obstruction. COVID-19 and flu A/B PCR on 2/12/2023 was negative.    Status post excisional debridement of sacral ulcer on 2/13/2023 with Dr. Taylor.       Assessment/Plan         ASSESSMENT:    Septic shock with lactic acid greater than 4 on admission  Acute and chronic decubitus ulcerations with osteomyelitis and possible abscess      PLAN:    Patient sitting up in bed this morning eating breakfast.  No issues or complaints.  Afebrile, no increase in ostomy output.  WBC normal at 8.28.  Bone and tissue culture from 2/13/2023 finalized as E. coli.  Lungs clear to auscultation bilaterally.    Based on finalization of wound culture results vancomycin was discontinued.  We will continue ceftriaxone monotherapy.  Patient will require 6 weeks prolonged course of IV antibiotic therapy for treatment of osteomyelitis.    Code Status:   Code Status and Medical Interventions:   Ordered at: 02/12/23 4430     Code Status (Patient has no pulse and is not breathing):    CPR (Attempt to Resuscitate)     Medical Interventions (Patient has pulse or is breathing):    Full Support       GUANACO Flores  02/16/23  13:10 EST

## 2023-02-16 NOTE — PLAN OF CARE
Goal Outcome Evaluation:      SBPs 100-120s, SPO2 stable on RA, no c/o CP or discomfort voiced this shift. Continues on IV ABX, JAIRO DL picc patent, flushes well, site free from s/s of infection. Wound care complete, pt tolerated well. LTACH referral in place.

## 2023-02-16 NOTE — PLAN OF CARE
Goal Outcome Evaluation:   Patient awake most of the night no complaints voiced. No acute distress noted. Patient's blood sugars remain elevated MD notified multiple times throughout the shift. Denies any pain or discomfort. Picc line intact and patent to right upper arm, both lumens patent and flushes without difficulty. Patient remains on IV antibiotics with no adverse reaction noted. Will continue to monitor and follow current plan of care

## 2023-02-16 NOTE — PROGRESS NOTES
[facility] HOSPITALIST PROGRESS NOTE     Patient Identification:  Name:  Ken Krueger  Age:  47 y.o.  Sex:  male  :  1975  MRN:  [id number]  Visit Number:  [id number]  Primary Care Provider:  Franchesca Hodges APRN    Length of stay:  4    Subjective: Patient seen and examined, currently asleep on CPAP, Accu-Chek results still hyperglycemic.  Afebrile, no more report of bleeding at the sacral wound.    Chief Complaint: Sacral ulcer  ----------------------------------------------------------------------------------------------------------------------  Current Hospital Meds:  Acidophilus/Pectin, 1 capsule, Oral, Daily  apixaban, 5 mg, Oral, Q12H  ARIPiprazole, 10 mg, Oral, Nightly  ascorbic acid, 500 mg, Oral, Daily  atorvastatin, 10 mg, Oral, Nightly  baclofen, 30 mg, Oral, 4x Daily With Meals & Nightly  bumetanide, 2 mg, Oral, Daily  carvedilol, 6.25 mg, Oral, BID With Meals  cefTRIAXone, 2 g, Intravenous, Q24H  cholecalciferol, 1,000 Units, Oral, Daily  dicyclomine, 10 mg, Oral, BID  DULoxetine, 60 mg, Oral, BID  ferrous sulfate, 325 mg, Oral, Daily With Breakfast  gabapentin, 800 mg, Oral, TID  Insulin Aspart, 0-14 Units, Subcutaneous, 4x Daily AC & at Bedtime  Insulin Aspart, 25 Units, Subcutaneous, TID With Meals  insulin detemir, 15 Units, Subcutaneous, Once  insulin detemir, 40 Units, Subcutaneous, Q12H  magic barrier cream, 1 application, Topical, BID  magnesium oxide, 1,200 mg, Oral, Daily  modafinil, 200 mg, Oral, Daily  multivitamin with minerals, 1 tablet, Oral, Daily  nystatin, , Topical, Q12H  pantoprazole, 40 mg, Oral, BID  sacubitril-valsartan, 1 tablet, Oral, Q12H  silver nitrate, 1 application, Topical, Once  sodium chloride, 10 mL, Intravenous, Q12H  spironolactone, 25 mg, Oral, Daily  sucralfate, 1 g, Oral, Daily  vancomycin, 1,750 mg, Intravenous, Q12H  Vericiguat, 2.5 mg, Oral, Daily      Pharmacy Consult - Pharmacy to dose,   Pharmacy Consult - Pharmacy to dose,        ----------------------------------------------------------------------------------------------------------------------  Vital Signs:  Temp:  [97.2 °F (36.2 °C)-98.3 °F (36.8 °C)] 98.2 °F (36.8 °C)  Heart Rate:  [80-89] 82  Resp:  [16-18] 16  BP: (108-123)/(63-89) 122/69       Tele: Sinus rhythm 78 bpm O2 saturations 97%      02/14/23  0500 02/15/23  0500 02/16/23  0500   Weight: (!) 143 kg (315 lb 3.2 oz) (!) 143 kg (314 lb 12.8 oz) (!) 139 kg (305 lb 14.4 oz)     Body mass index is 42.66 kg/m².    Intake/Output Summary (Last 24 hours) at 2/16/2023 1209  Last data filed at 2/16/2023 0400  Gross per 24 hour   Intake 2678 ml   Output 1900 ml   Net 778 ml     Diet: Diabetic Diets; Consistent Carbohydrate; Texture: Regular Texture (IDDSI 7); Fluid Consistency: Thin (IDDSI 0)  ----------------------------------------------------------------------------------------------------------------------  Physical exam:  General: Sleeping arousable wearing CPAP, comfortable,awake, alert, oriented to self, place, and time,   No respiratory distress.    Skin:  Skin is warm and dry. No rash noted. No pallor.    HENT:  Head:  Normocephalic and atraumatic.  Mouth:  Moist mucous membranes.    Eyes:  Conjunctivae and EOM are normal.  Pupils are equal, round, and reactive to light.  No scleral icterus.    Neck:  Neck supple.  No JVD present.    Pulmonary/Chest:  No respiratory distress, no wheezes, no crackles, with normal breath sounds and good air movement.  Cardiovascular:  Normal rate, regular rhythm and normal heart sounds with no murmur.  Abdominal: Morbidly obese with large panniculus, left-sided colostomy with greenish stools, right-sided ileostomy with pale elvin urine no tenderness,   extremities:  No edema, no tenderness, and no deformity.  No red or swollen joints anywhere.  Strong pulses in all 4 extremities with no clubbing, no cyanosis, no edema.  Neurological: Patient is paraplegic   genitourinary: Ileostomy with pale  elvin urine  Back:  ----------------------------------------------------------------------------------------------------------------------  ----------------------------------------------------------------------------------------------------------------------      Results from last 7 days   Lab Units 02/16/23 0056 02/13/23  1852 02/13/23  1215 02/13/23  0622 02/13/23  0302 02/12/23  2331 02/12/23  2106   CRP mg/dL  --   --   --   --   --   --  9.14*   LACTATE mmol/L  --  1.6 2.2* 2.6* 2.6*   < > 4.9*   WBC 10*3/mm3 8.28  --   --   --  13.35*  --  12.75*   HEMOGLOBIN g/dL 10.6*  --   --   --  11.4*  --  12.6*   HEMATOCRIT % 36.1*  --   --   --  37.6  --  41.4   MCV fL 90.9  --   --   --  86.4  --  87.9   MCHC g/dL 29.4*  --   --   --  30.3*  --  30.4*   PLATELETS 10*3/mm3 337  --   --   --  322  --  439    < > = values in this interval not displayed.         Results from last 7 days   Lab Units 02/16/23  0056 02/15/23  1153 02/15/23  0122 02/13/23  0302 02/12/23  2106   SODIUM mmol/L 133*  --  133* 133* 131*   POTASSIUM mmol/L 3.9 4.0 3.2* 3.3* 3.3*   MAGNESIUM mg/dL  --   --   --  1.6  --    CHLORIDE mmol/L 97*  --  96* 93* 87*   CO2 mmol/L 25.0  --  29.2* 23.3 28.6   BUN mg/dL 21*  --  21* 31* 30*   CREATININE mg/dL 0.86  --  0.87 1.19 1.34*   CALCIUM mg/dL 8.5*  --  8.7 8.4* 9.5   GLUCOSE mg/dL 536*  --  398* 398* 519*   ALBUMIN g/dL  --   --   --  3.1* 3.6   BILIRUBIN mg/dL  --   --   --  0.3 0.2   ALK PHOS U/L  --   --   --  108 123*   AST (SGOT) U/L  --   --   --  36 48*   ALT (SGPT) U/L  --   --   --  33 42*   Estimated Creatinine Clearance: 155 mL/min (by C-G formula based on SCr of 0.86 mg/dL).    No results found for: AMMONIA      Blood Culture   Date Value Ref Range Status   02/12/2023 No growth at 3 days  Preliminary   02/12/2023 No growth at 3 days  Preliminary     No results found for: URINECX  No results found for: WOUNDCX  No results found for: STOOLCX    I have personally looked at the labs and they  are summarized above.  ----------------------------------------------------------------------------------------------------------------------  Imaging Results (Last 24 Hours)     ** No results found for the last 24 hours. **        ----------------------------------------------------------------------------------------------------------------------  Assessment and Plan:  -Sepsis present on admission secondary to large sacral decubiti present admission with osteomyelitis E. coli on tissue culture  -Severe hyperglycemia from diabetes type 2 uncontrolled  -Morbid obese complicating all aspects of care  -Paraplegia  -History of ileostomy and colostomy  -Hepatic steatosis  -History of PE on chronic anticoagulation  -History of nonischemic cardiomyopathy EF of 21 to 25%  -History of obstructive sleep apnea/OHS on CPAP at    Further increase basal insulin and preprandial insulin dosing, will increase sliding scale coverage dosing, wound care.    Disposition [facility] consult      Yovana Pack MD  02/16/23  12:09 EST

## 2023-02-16 NOTE — CASE MANAGEMENT/SOCIAL WORK
Discharge Planning Assessment   Fabricio     Patient Name: Ken Krueger  MRN: [id number]  Today's Date: 2/16/2023    Admit Date: 2/12/2023    Plan: SS spoke with physician on rounds on this date. Pt does not have a 3 night PCU/CCU stay to qualify for pt's insurance. SS spoke with pt at bedside on this date and pt is agreeable for referral to be sent to another LTACH. SS faxed referral to [facility] in [place] [phone]. SS contacted [facility] per [place] [phone] to notify her about new referral. SS to follow.     Discharge Plan     Row Name 02/16/23 1658       Plan    Plan SS spoke with physician on rounds on this date. Pt does not have a 3 night PCU/CCU stay to qualify for pt's insurance. SS spoke with pt at bedside on this date and pt is agreeable for referral to be sent to another LTACH. SS faxed referral to [facility] in [place] [phone]. SS contacted [facility] per [place] [phone] to notify her about new referral. SS to follow.              HUMPHREY Loya
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
## 2023-02-17 LAB
ANION GAP SERPL CALCULATED.3IONS-SCNC: 9.4 MMOL/L (ref 5–15)
BACTERIA SPEC AEROBE CULT: NORMAL
BACTERIA SPEC AEROBE CULT: NORMAL
BASOPHILS # BLD AUTO: 0.05 10*3/MM3 (ref 0–0.2)
BASOPHILS NFR BLD AUTO: 0.6 % (ref 0–1.5)
BUN SERPL-MCNC: 21 MG/DL (ref 6–20)
BUN/CREAT SERPL: 19.4 (ref 7–25)
CALCIUM SPEC-SCNC: 9 MG/DL (ref 8.6–10.5)
CHLORIDE SERPL-SCNC: 96 MMOL/L (ref 98–107)
CO2 SERPL-SCNC: 25.6 MMOL/L (ref 22–29)
CREAT SERPL-MCNC: 1.08 MG/DL (ref 0.76–1.27)
DEPRECATED RDW RBC AUTO: 45.3 FL (ref 37–54)
EGFRCR SERPLBLD CKD-EPI 2021: 86.2 ML/MIN/1.73
EOSINOPHIL # BLD AUTO: 0.19 10*3/MM3 (ref 0–0.4)
EOSINOPHIL NFR BLD AUTO: 2.3 % (ref 0.3–6.2)
ERYTHROCYTE [DISTWIDTH] IN BLOOD BY AUTOMATED COUNT: 13.5 % (ref 12.3–15.4)
GLUCOSE BLDC GLUCOMTR-MCNC: 326 MG/DL (ref 70–130)
GLUCOSE BLDC GLUCOMTR-MCNC: 356 MG/DL (ref 70–130)
GLUCOSE BLDC GLUCOMTR-MCNC: 377 MG/DL (ref 70–130)
GLUCOSE BLDC GLUCOMTR-MCNC: 393 MG/DL (ref 70–130)
GLUCOSE BLDC GLUCOMTR-MCNC: 417 MG/DL (ref 70–130)
GLUCOSE SERPL-MCNC: 382 MG/DL (ref 65–99)
HCT VFR BLD AUTO: 36.3 % (ref 37.5–51)
HGB BLD-MCNC: 10.7 G/DL (ref 13–17.7)
IMM GRANULOCYTES # BLD AUTO: 0.05 10*3/MM3 (ref 0–0.05)
IMM GRANULOCYTES NFR BLD AUTO: 0.6 % (ref 0–0.5)
LYMPHOCYTES # BLD AUTO: 1.94 10*3/MM3 (ref 0.7–3.1)
LYMPHOCYTES NFR BLD AUTO: 23.4 % (ref 19.6–45.3)
MCH RBC QN AUTO: 26.8 PG (ref 26.6–33)
MCHC RBC AUTO-ENTMCNC: 29.5 G/DL (ref 31.5–35.7)
MCV RBC AUTO: 91 FL (ref 79–97)
MONOCYTES # BLD AUTO: 0.27 10*3/MM3 (ref 0.1–0.9)
MONOCYTES NFR BLD AUTO: 3.3 % (ref 5–12)
NEUTROPHILS NFR BLD AUTO: 5.8 10*3/MM3 (ref 1.7–7)
NEUTROPHILS NFR BLD AUTO: 69.8 % (ref 42.7–76)
NRBC BLD AUTO-RTO: 0 /100 WBC (ref 0–0.2)
PLATELET # BLD AUTO: 206 10*3/MM3 (ref 140–450)
PMV BLD AUTO: 10.4 FL (ref 6–12)
POTASSIUM SERPL-SCNC: 3.6 MMOL/L (ref 3.5–5.2)
RBC # BLD AUTO: 3.99 10*6/MM3 (ref 4.14–5.8)
SODIUM SERPL-SCNC: 131 MMOL/L (ref 136–145)
WBC NRBC COR # BLD: 8.3 10*3/MM3 (ref 3.4–10.8)

## 2023-02-17 PROCEDURE — 63710000001 INSULIN DETEMIR PER 5 UNITS: Performed by: HOSPITALIST

## 2023-02-17 PROCEDURE — 85025 COMPLETE CBC W/AUTO DIFF WBC: CPT | Performed by: HOSPITALIST

## 2023-02-17 PROCEDURE — 63710000001 INSULIN ASPART PER 5 UNITS

## 2023-02-17 PROCEDURE — 63710000001 INSULIN ASPART PER 5 UNITS: Performed by: HOSPITALIST

## 2023-02-17 PROCEDURE — 99232 SBSQ HOSP IP/OBS MODERATE 35: CPT | Performed by: HOSPITALIST

## 2023-02-17 PROCEDURE — 25010000002 CEFTRIAXONE PER 250 MG: Performed by: NURSE PRACTITIONER

## 2023-02-17 PROCEDURE — 82962 GLUCOSE BLOOD TEST: CPT

## 2023-02-17 PROCEDURE — 80048 BASIC METABOLIC PNL TOTAL CA: CPT | Performed by: HOSPITALIST

## 2023-02-17 PROCEDURE — 99232 SBSQ HOSP IP/OBS MODERATE 35: CPT | Performed by: NURSE PRACTITIONER

## 2023-02-17 RX ADMIN — CEFTRIAXONE 2 G: 2 INJECTION, POWDER, FOR SOLUTION INTRAMUSCULAR; INTRAVENOUS at 08:09

## 2023-02-17 RX ADMIN — VITAMINS A AND D OINTMENT 1 APPLICATION: 15.5; 53.4 OINTMENT TOPICAL at 08:18

## 2023-02-17 RX ADMIN — BACLOFEN 30 MG: 10 TABLET ORAL at 08:16

## 2023-02-17 RX ADMIN — APIXABAN 5 MG: 5 TABLET, FILM COATED ORAL at 21:22

## 2023-02-17 RX ADMIN — APIXABAN 5 MG: 5 TABLET, FILM COATED ORAL at 08:16

## 2023-02-17 RX ADMIN — OXYCODONE HYDROCHLORIDE AND ACETAMINOPHEN 500 MG: 500 TABLET ORAL at 08:16

## 2023-02-17 RX ADMIN — INSULIN ASPART 12 UNITS: 100 INJECTION, SOLUTION INTRAVENOUS; SUBCUTANEOUS at 10:49

## 2023-02-17 RX ADMIN — BUMETANIDE 2 MG: 1 TABLET ORAL at 08:16

## 2023-02-17 RX ADMIN — GABAPENTIN 800 MG: 400 CAPSULE ORAL at 08:16

## 2023-02-17 RX ADMIN — Medication 10 ML: at 08:17

## 2023-02-17 RX ADMIN — PANTOPRAZOLE SODIUM 40 MG: 40 TABLET, DELAYED RELEASE ORAL at 08:16

## 2023-02-17 RX ADMIN — MAGNESIUM GLUCONATE 500 MG ORAL TABLET 1200 MG: 500 TABLET ORAL at 08:15

## 2023-02-17 RX ADMIN — GABAPENTIN 800 MG: 400 CAPSULE ORAL at 17:27

## 2023-02-17 RX ADMIN — Medication 10 ML: at 21:22

## 2023-02-17 RX ADMIN — VITAMINS A AND D OINTMENT 1 APPLICATION: 15.5; 53.4 OINTMENT TOPICAL at 21:23

## 2023-02-17 RX ADMIN — INSULIN ASPART 25 UNITS: 100 INJECTION, SOLUTION INTRAVENOUS; SUBCUTANEOUS at 12:11

## 2023-02-17 RX ADMIN — ARIPIPRAZOLE 10 MG: 10 TABLET ORAL at 21:22

## 2023-02-17 RX ADMIN — NYSTATIN: 100000 POWDER TOPICAL at 21:22

## 2023-02-17 RX ADMIN — INSULIN DETEMIR 45 UNITS: 100 INJECTION, SOLUTION SUBCUTANEOUS at 21:22

## 2023-02-17 RX ADMIN — SACUBITRIL AND VALSARTAN 1 TABLET: 24; 26 TABLET, FILM COATED ORAL at 21:22

## 2023-02-17 RX ADMIN — VERICIGUAT 2.5 MG: 2.5 TABLET, FILM COATED ORAL at 08:19

## 2023-02-17 RX ADMIN — SACUBITRIL AND VALSARTAN 1 TABLET: 24; 26 TABLET, FILM COATED ORAL at 08:16

## 2023-02-17 RX ADMIN — INSULIN ASPART 12 UNITS: 100 INJECTION, SOLUTION INTRAVENOUS; SUBCUTANEOUS at 17:28

## 2023-02-17 RX ADMIN — BACLOFEN 30 MG: 10 TABLET ORAL at 17:27

## 2023-02-17 RX ADMIN — Medication 1 CAPSULE: at 08:16

## 2023-02-17 RX ADMIN — DICYCLOMINE HYDROCHLORIDE 10 MG: 10 CAPSULE ORAL at 08:15

## 2023-02-17 RX ADMIN — MODAFINIL 200 MG: 100 TABLET ORAL at 08:15

## 2023-02-17 RX ADMIN — INSULIN DETEMIR 45 UNITS: 100 INJECTION, SOLUTION SUBCUTANEOUS at 08:12

## 2023-02-17 RX ADMIN — CARVEDILOL 6.25 MG: 6.25 TABLET, FILM COATED ORAL at 17:28

## 2023-02-17 RX ADMIN — DULOXETINE HYDROCHLORIDE 60 MG: 60 CAPSULE, DELAYED RELEASE ORAL at 08:16

## 2023-02-17 RX ADMIN — Medication 1 TABLET: at 08:16

## 2023-02-17 RX ADMIN — DULOXETINE HYDROCHLORIDE 60 MG: 60 CAPSULE, DELAYED RELEASE ORAL at 21:22

## 2023-02-17 RX ADMIN — ATORVASTATIN CALCIUM 10 MG: 10 TABLET, FILM COATED ORAL at 21:22

## 2023-02-17 RX ADMIN — SUCRALFATE 1 G: 1 TABLET ORAL at 08:14

## 2023-02-17 RX ADMIN — INSULIN ASPART 25 UNITS: 100 INJECTION, SOLUTION INTRAVENOUS; SUBCUTANEOUS at 08:12

## 2023-02-17 RX ADMIN — FERROUS SULFATE TAB 325 MG (65 MG ELEMENTAL FE) 325 MG: 325 (65 FE) TAB at 08:16

## 2023-02-17 RX ADMIN — NYSTATIN: 100000 POWDER TOPICAL at 08:18

## 2023-02-17 RX ADMIN — DICYCLOMINE HYDROCHLORIDE 10 MG: 10 CAPSULE ORAL at 21:23

## 2023-02-17 RX ADMIN — Medication 1000 UNITS: at 08:16

## 2023-02-17 RX ADMIN — SPIRONOLACTONE 25 MG: 25 TABLET ORAL at 08:16

## 2023-02-17 RX ADMIN — BACLOFEN 30 MG: 10 TABLET ORAL at 12:11

## 2023-02-17 RX ADMIN — INSULIN ASPART 25 UNITS: 100 INJECTION, SOLUTION INTRAVENOUS; SUBCUTANEOUS at 17:31

## 2023-02-17 RX ADMIN — CARVEDILOL 6.25 MG: 6.25 TABLET, FILM COATED ORAL at 08:16

## 2023-02-17 RX ADMIN — INSULIN ASPART 10 UNITS: 100 INJECTION, SOLUTION INTRAVENOUS; SUBCUTANEOUS at 08:11

## 2023-02-17 RX ADMIN — GABAPENTIN 800 MG: 400 CAPSULE ORAL at 21:24

## 2023-02-17 RX ADMIN — PANTOPRAZOLE SODIUM 40 MG: 40 TABLET, DELAYED RELEASE ORAL at 21:22

## 2023-02-17 RX ADMIN — BACLOFEN 30 MG: 10 TABLET ORAL at 21:22

## 2023-02-17 RX ADMIN — INSULIN ASPART 10 UNITS: 100 INJECTION, SOLUTION INTRAVENOUS; SUBCUTANEOUS at 00:03

## 2023-02-17 NOTE — PROGRESS NOTES
Antimicrobial Length of Therapy:    Day 3 Rocephin (day 5 with cefepime)    Thank you,    Gayle Kaye, PharmD  2/17/2023  14:24 EST
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
## 2023-02-17 NOTE — PLAN OF CARE
Goal Outcome Evaluation:   Patient continues to have increased blood glucose this shift. ARNP notified with new orders received and carried out. Only slight decreases noted in blood sugars. Patient displays no s/s of hyperglycemia. No acute distress noted. Call light and personal items in reach. Will continue to monitor and follow plan of care.

## 2023-02-17 NOTE — PROGRESS NOTES
PROGRESS NOTE         Patient Identification:  Name:  Ken Krueger  Age:  47 y.o.  Sex:  male  :  1975  MRN:  [id number]  Visit Number:  [id number]  Primary Care Provider:  Franchesca Hodges APRN         LOS: 5 days       ----------------------------------------------------------------------------------------------------------------------  Subjective       Chief Complaints:    Wound Check        Interval History:      Patient sitting up in bed this morning.  Overall feels well today.  No issues or complaints.  Currently on room air with no apparent distress.  Lungs clear to auscultation bilaterally.  No increase in ostomy output.  WBC normal at 8.30.    Review of Systems:    Constitutional: no fever, chills and night sweats.  No fatigue.  Eyes: no eye drainage, itching or redness.  HEENT: no mouth sores, dysphagia or nose bleed.  Respiratory: no for shortness of breath, cough or production of sputum.  Cardiovascular: no chest pain, no palpitations, no orthopnea.  Gastrointestinal: no nausea, vomiting or diarrhea. No abdominal pain, hematemesis or rectal bleeding.  Genitourinary: no dysuria or polyuria.  Hematologic/lymphatic: no lymph node abnormalities, no easy bruising or easy bleeding.  Musculoskeletal: no muscle or joint pain.  Skin: No rash and no itching.  Decubitus ulcers.  Neurological: no loss of consciousness, no seizure, no headache.  Behavioral/Psych: no depression or suicidal ideation.  Endocrine: no hot flashes.  Immunologic: negative.    ----------------------------------------------------------------------------------------------------------------------      Objective       [facility] Meds:  Acidophilus/Pectin, 1 capsule, Oral, Daily  apixaban, 5 mg, Oral, Q12H  ARIPiprazole, 10 mg, Oral, Nightly  ascorbic acid, 500 mg, Oral, Daily  atorvastatin, 10 mg, Oral, Nightly  baclofen, 30 mg, Oral, 4x Daily With Meals & Nightly  bumetanide, 2 mg, Oral, Daily  carvedilol, 6.25 mg, Oral,  BID With Meals  cefTRIAXone, 2 g, Intravenous, Q24H  cholecalciferol, 1,000 Units, Oral, Daily  dicyclomine, 10 mg, Oral, BID  DULoxetine, 60 mg, Oral, BID  ferrous sulfate, 325 mg, Oral, Daily With Breakfast  gabapentin, 800 mg, Oral, TID  Insulin Aspart, 0-14 Units, Subcutaneous, TID AC  Insulin Aspart, 25 Units, Subcutaneous, TID With Meals  insulin detemir, 45 Units, Subcutaneous, Q12H  magic barrier cream, 1 application, Topical, BID  magnesium oxide, 1,200 mg, Oral, Daily  modafinil, 200 mg, Oral, Daily  multivitamin with minerals, 1 tablet, Oral, Daily  nystatin, , Topical, Q12H  pantoprazole, 40 mg, Oral, BID  sacubitril-valsartan, 1 tablet, Oral, Q12H  silver nitrate, 1 application, Topical, Once  sodium chloride, 10 mL, Intravenous, Q12H  spironolactone, 25 mg, Oral, Daily  sucralfate, 1 g, Oral, Daily  Vericiguat, 2.5 mg, Oral, Daily      Pharmacy Consult - Pharmacy to dose,   Pharmacy Consult - Pharmacy to dose,       ----------------------------------------------------------------------------------------------------------------------    Vital Signs:  Temp:  [97.2 °F (36.2 °C)-98.4 °F (36.9 °C)] 98.4 °F (36.9 °C)  Heart Rate:  [83-90] 84  Resp:  [16-18] 16  BP: (100-140)/(58-76) 111/68  Mean Arterial Pressure (Non-Invasive) for the past 24 hrs (Last 3 readings):   Noninvasive MAP (mmHg)   02/17/23 0600 85   02/17/23 0338 94   02/17/23 0103 95     SpO2 Percentage    02/17/23 0103 02/17/23 0338 02/17/23 0600   SpO2: 93% 93% 95%     SpO2:  [90 %-95 %] 95 %  on   ;   Device (Oxygen Therapy): room air    Body mass index is 42.73 kg/m².  Wt Readings from Last 3 Encounters:   02/17/23 (!) 139 kg (306 lb 6.4 oz)   12/13/22 (!) 141 kg (310 lb 14.4 oz)   12/02/22 (!) 150 kg (330 lb)        Intake/Output Summary (Last 24 hours) at 2/17/2023 1055  Last data filed at 2/17/2023 0400  Gross per 24 hour   Intake 1140 ml   Output 4100 ml   Net -2960 ml     Diet: Diabetic Diets; Consistent Carbohydrate; Texture: Regular  Texture (IDDSI 7); Fluid Consistency: Thin (IDDSI 0)  ----------------------------------------------------------------------------------------------------------------------      Physical Exam:    Constitutional: Very pleasant obese male is sitting up in bed on room air in no apparent distress.  Appears very comfortable.    HENT:  Head: Normocephalic and atraumatic.  Mouth:  Moist mucous membranes.    Eyes:  Conjunctivae and EOM are normal.  No scleral icterus.  Neck:  Neck supple.  No JVD present.    Cardiovascular:  Normal rate, regular rhythm and normal heart sounds with no murmur. No edema.  Pulmonary/Chest:  No respiratory distress, no wheezes, no crackles, with normal breath sounds and good air movement.  Lungs clear to auscultation bilaterally  Abdominal: Obese.  Soft.  Bowel sounds are normal.  No distension and no tenderness.  Ileal conduit and colostomy bag in place.  Musculoskeletal:  No edema and no tenderness.  No swelling or redness of joints.  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  Neurological:  Alert and oriented to person, place, and time.  No facial droop.  No slurred speech.   Skin:  Stage IV sacral decubitus wound status post excisional debridement.  Psychiatric:  Normal mood and affect.  Behavior is normal.      ----------------------------------------------------------------------------------------------------------------------            Results from last 7 days   Lab Units 02/17/23  0240 02/16/23  0056 02/13/23  1852 02/13/23  1215 02/13/23  0622 02/13/23  0302 02/12/23  2331 02/12/23  2106   CRP mg/dL  --   --   --   --   --   --   --  9.14*   LACTATE mmol/L  --   --  1.6 2.2* 2.6* 2.6*   < > 4.9*   WBC 10*3/mm3 8.30 8.28  --   --   --  13.35*  --  12.75*   HEMOGLOBIN g/dL 10.7* 10.6*  --   --   --  11.4*  --  12.6*   HEMATOCRIT % 36.3* 36.1*  --   --   --  37.6  --  41.4   MCV fL 91.0 90.9  --   --   --  86.4  --  87.9   MCHC g/dL 29.5* 29.4*  --   --   --  30.3*  --  30.4*   PLATELETS 10*3/mm3 206 337   --   --   --  322  --  439    < > = values in this interval not displayed.     Results from last 7 days   Lab Units 02/17/23  0240 02/16/23  0056 02/15/23  1153 02/15/23  0122 02/13/23  0302 02/12/23  2106   SODIUM mmol/L 131* 133*  --  133* 133* 131*   POTASSIUM mmol/L 3.6 3.9 4.0 3.2* 3.3* 3.3*   MAGNESIUM mg/dL  --   --   --   --  1.6  --    CHLORIDE mmol/L 96* 97*  --  96* 93* 87*   CO2 mmol/L 25.6 25.0  --  29.2* 23.3 28.6   BUN mg/dL 21* 21*  --  21* 31* 30*   CREATININE mg/dL 1.08 0.86  --  0.87 1.19 1.34*   CALCIUM mg/dL 9.0 8.5*  --  8.7 8.4* 9.5   GLUCOSE mg/dL 382* 536*  --  398* 398* 519*   ALBUMIN g/dL  --   --   --   --  3.1* 3.6   BILIRUBIN mg/dL  --   --   --   --  0.3 0.2   ALK PHOS U/L  --   --   --   --  108 123*   AST (SGOT) U/L  --   --   --   --  36 48*   ALT (SGPT) U/L  --   --   --   --  33 42*   Estimated Creatinine Clearance: 123.4 mL/min (by C-G formula based on SCr of 1.08 mg/dL).  No results found for: AMMONIA    Glucose   Date/Time Value Ref Range Status   02/17/2023 1037 393 (H) 70 - 130 mg/dL Final     Comment:     Meter: [id number] : [id number] CLAUDE MCDANIEL   02/17/2023 0623 326 (H) 70 - 130 mg/dL Final     Comment:     Meter: [id number] : [id number] CLAUDE MCDANIEL   02/17/2023 0142 417 (C) 70 - 130 mg/dL Final     Comment:     RN Notified Meter: [id number] : [id number] NEIL SANCHEZ   02/16/2023 2349 433 (C) 70 - 130 mg/dL Final     Comment:     RN Notified Meter: [id number] : [id number] UNIQUE FAN   02/16/2023 2143 511 (C) 70 - 130 mg/dL Final     Comment:     RN Notified Meter: [id number] : [id number] OBDULIA CASTRO   02/16/2023 1700 562 (C) 70 - 130 mg/dL Final     Comment:     RN Notified Meter: [id number] : [id number] CLAUDE MCDANIEL   02/16/2023 0951 488 (C) 70 - 130 mg/dL Final     Comment:     RN Notified Meter: [id number] : [id number] CLAUDE MCDANIEL   02/16/2023 0718 448 (C) 70 - 130 mg/dL Final     Comment:     Confirmed by Repeat RN Notified Meter:  [id number] : [id number] CLAUDE MCDANIEL     Lab Results   Component Value Date    HGBA1C 11.50 (H) 02/13/2023     Lab Results   Component Value Date    TSH 3.780 07/19/2022    FREET4 1.31 06/02/2021       Blood Culture   Date Value Ref Range Status   02/12/2023 No growth at 24 hours  Preliminary   02/12/2023 No growth at 24 hours  Preliminary     No results found for: URINECX  No results found for: WOUNDCX  No results found for: STOOLCX  No results found for: RESPCX  Pain Management Panel     Pain Management Panel Latest Ref Rng & Units 6/2/2021 8/19/2018    AMPHETAMINES SCREEN, URINE Negative Negative Negative    BARBITURATES SCREEN Negative Negative Negative    BENZODIAZEPINE SCREEN, URINE Negative Negative Negative    BUPRENORPHINEUR Negative Negative Negative    COCAINE SCREEN, URINE Negative Negative Negative    METHADONE SCREEN, URINE Negative Negative Negative            ----------------------------------------------------------------------------------------------------------------------  Imaging Results (Last 24 Hours)     ** No results found for the last 24 hours. **          -----------------------------------------------------------------------------------  Pertinent Infectious Disease Results     CT abdomen pelvis with contrast on 2/12/2023 showed there is a new decubitus ulcer just to the right of midline at the level of the coccyx with constellation of imaging findings most characteristic of acute infection/osteomyelitis. Small pocket of air and fluid adjacent to the coccyx measuring 3.7 x 4.1 cm could reflect phlegmon or abscess. Chronic bilateral decubitus ulcers at the level of the ischial tuberosities bilaterally with imaging findings suggestive of chronic infection/osteomyelitis, similar to prior. Hepatic steatosis and hepatomegaly with borderline splenomegaly. Nonobstructing left renal stones. Postoperative changes of left lower quadrant and colostomy and right mid abdominal ileal conduit  formation. Diverticulosis. No diverticulitis or bowel obstruction. COVID-19 and flu A/B PCR on 2/12/2023 was negative.    Status post excisional debridement of sacral ulcer on 2/13/2023 with Dr. Taylor.       Assessment/Plan         ASSESSMENT:    Septic shock with lactic acid greater than 4 on admission  Acute and chronic decubitus ulcerations with osteomyelitis and possible abscess      PLAN:    Patient sitting up in bed this morning.  Overall feels well today.  No issues or complaints.  Currently on room air with no apparent distress.  Lungs clear to auscultation bilaterally.  No increase in ostomy output.  WBC normal at 8.30.    Bone and tissue culture from 2/13/2023 finalized as E. coli.      Based on finalization of culture results recommend to continue ceftriaxone through 3/29/2023 for treatment of findings concerning for osteomyelitis.  We will continue to follow closely and adjust antibiotic therapy as needed.    Code Status:   Code Status and Medical Interventions:   Ordered at: 02/12/23 2326     Code Status (Patient has no pulse and is not breathing):    CPR (Attempt to Resuscitate)     Medical Interventions (Patient has pulse or is breathing):    Full Support       GUANACO Flores  02/17/23  10:55 EST

## 2023-02-17 NOTE — CASE MANAGEMENT/SOCIAL WORK
Discharge Planning Assessment   Fabricio     Patient Name: Ken Krueger  MRN: [id number]  Today's Date: 2/17/2023    Admit Date: 2/12/2023    Plan: SS contacted [facility] per Rachelle [phone] who states she has printed the referral and is doing a work up with pt's information. Rachelle states she will return call once all information is reviewed. SS to follow.   Discharge Plan     Row Name 02/17/23 1153       Plan    Plan SS contacted [facility] per Rachelle [phone] who states she has printed the referral and is doing a work up with pt's information. Rachelle states she will return call once all information is reviewed. SS to follow.    9945- SS spoke with pt at bedside. Pt states he is not willing to go to [place]. Pt states he has done long term IV Abx at home with home health in past. Pt states he would like look at swing bed option or home with home health. SS notified physician.               PILO LoyaW

## 2023-02-17 NOTE — PLAN OF CARE
Goal Outcome Evaluation:      Wound care completed per nurse practitioner note. Patient is lying in bed with no S&S of distress. No complaints at this time. Will continue to follow plan of care.
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
## 2023-02-18 LAB
GLUCOSE BLDC GLUCOMTR-MCNC: 355 MG/DL (ref 70–130)
GLUCOSE BLDC GLUCOMTR-MCNC: 380 MG/DL (ref 70–130)
GLUCOSE BLDC GLUCOMTR-MCNC: 388 MG/DL (ref 70–130)
GLUCOSE BLDC GLUCOMTR-MCNC: 393 MG/DL (ref 70–130)

## 2023-02-18 PROCEDURE — 63710000001 INSULIN ASPART PER 5 UNITS: Performed by: HOSPITALIST

## 2023-02-18 PROCEDURE — 63710000001 INSULIN DETEMIR PER 5 UNITS: Performed by: HOSPITALIST

## 2023-02-18 PROCEDURE — 99232 SBSQ HOSP IP/OBS MODERATE 35: CPT | Performed by: PHYSICIAN ASSISTANT

## 2023-02-18 PROCEDURE — 25010000002 CEFTRIAXONE PER 250 MG: Performed by: NURSE PRACTITIONER

## 2023-02-18 PROCEDURE — 82962 GLUCOSE BLOOD TEST: CPT

## 2023-02-18 PROCEDURE — 99232 SBSQ HOSP IP/OBS MODERATE 35: CPT | Performed by: HOSPITALIST

## 2023-02-18 RX ORDER — CARVEDILOL 6.25 MG/1
12.5 TABLET ORAL 2 TIMES DAILY WITH MEALS
Status: DISCONTINUED | OUTPATIENT
Start: 2023-02-18 | End: 2023-02-21 | Stop reason: HOSPADM

## 2023-02-18 RX ADMIN — OXYCODONE HYDROCHLORIDE AND ACETAMINOPHEN 500 MG: 500 TABLET ORAL at 08:57

## 2023-02-18 RX ADMIN — NYSTATIN: 100000 POWDER TOPICAL at 08:56

## 2023-02-18 RX ADMIN — NYSTATIN: 100000 POWDER TOPICAL at 20:44

## 2023-02-18 RX ADMIN — SACUBITRIL AND VALSARTAN 1 TABLET: 24; 26 TABLET, FILM COATED ORAL at 20:44

## 2023-02-18 RX ADMIN — Medication 10 ML: at 08:45

## 2023-02-18 RX ADMIN — BACLOFEN 30 MG: 10 TABLET ORAL at 08:44

## 2023-02-18 RX ADMIN — SACUBITRIL AND VALSARTAN 1 TABLET: 24; 26 TABLET, FILM COATED ORAL at 08:43

## 2023-02-18 RX ADMIN — INSULIN ASPART 12 UNITS: 100 INJECTION, SOLUTION INTRAVENOUS; SUBCUTANEOUS at 17:40

## 2023-02-18 RX ADMIN — VERICIGUAT 2.5 MG: 2.5 TABLET, FILM COATED ORAL at 08:56

## 2023-02-18 RX ADMIN — CARVEDILOL 12.5 MG: 6.25 TABLET, FILM COATED ORAL at 17:41

## 2023-02-18 RX ADMIN — INSULIN ASPART 25 UNITS: 100 INJECTION, SOLUTION INTRAVENOUS; SUBCUTANEOUS at 17:40

## 2023-02-18 RX ADMIN — BACLOFEN 30 MG: 10 TABLET ORAL at 11:59

## 2023-02-18 RX ADMIN — INSULIN DETEMIR 55 UNITS: 100 INJECTION, SOLUTION SUBCUTANEOUS at 08:44

## 2023-02-18 RX ADMIN — GABAPENTIN 800 MG: 400 CAPSULE ORAL at 08:56

## 2023-02-18 RX ADMIN — MAGNESIUM GLUCONATE 500 MG ORAL TABLET 1200 MG: 500 TABLET ORAL at 08:44

## 2023-02-18 RX ADMIN — PANTOPRAZOLE SODIUM 40 MG: 40 TABLET, DELAYED RELEASE ORAL at 08:56

## 2023-02-18 RX ADMIN — Medication 10 ML: at 20:43

## 2023-02-18 RX ADMIN — ATORVASTATIN CALCIUM 10 MG: 10 TABLET, FILM COATED ORAL at 20:44

## 2023-02-18 RX ADMIN — SPIRONOLACTONE 25 MG: 25 TABLET ORAL at 08:43

## 2023-02-18 RX ADMIN — ARIPIPRAZOLE 10 MG: 10 TABLET ORAL at 20:44

## 2023-02-18 RX ADMIN — CEFTRIAXONE 2 G: 2 INJECTION, POWDER, FOR SOLUTION INTRAMUSCULAR; INTRAVENOUS at 08:56

## 2023-02-18 RX ADMIN — GABAPENTIN 800 MG: 400 CAPSULE ORAL at 20:46

## 2023-02-18 RX ADMIN — Medication 1 CAPSULE: at 08:57

## 2023-02-18 RX ADMIN — DULOXETINE HYDROCHLORIDE 60 MG: 60 CAPSULE, DELAYED RELEASE ORAL at 20:44

## 2023-02-18 RX ADMIN — DULOXETINE HYDROCHLORIDE 60 MG: 60 CAPSULE, DELAYED RELEASE ORAL at 08:44

## 2023-02-18 RX ADMIN — Medication 1 TABLET: at 08:57

## 2023-02-18 RX ADMIN — PANTOPRAZOLE SODIUM 40 MG: 40 TABLET, DELAYED RELEASE ORAL at 20:44

## 2023-02-18 RX ADMIN — APIXABAN 5 MG: 5 TABLET, FILM COATED ORAL at 20:44

## 2023-02-18 RX ADMIN — INSULIN ASPART 25 UNITS: 100 INJECTION, SOLUTION INTRAVENOUS; SUBCUTANEOUS at 08:44

## 2023-02-18 RX ADMIN — VITAMINS A AND D OINTMENT 1 APPLICATION: 15.5; 53.4 OINTMENT TOPICAL at 08:45

## 2023-02-18 RX ADMIN — BACLOFEN 30 MG: 10 TABLET ORAL at 17:40

## 2023-02-18 RX ADMIN — INSULIN ASPART 12 UNITS: 100 INJECTION, SOLUTION INTRAVENOUS; SUBCUTANEOUS at 08:44

## 2023-02-18 RX ADMIN — FERROUS SULFATE TAB 325 MG (65 MG ELEMENTAL FE) 325 MG: 325 (65 FE) TAB at 08:44

## 2023-02-18 RX ADMIN — BACLOFEN 30 MG: 10 TABLET ORAL at 20:44

## 2023-02-18 RX ADMIN — DICYCLOMINE HYDROCHLORIDE 10 MG: 10 CAPSULE ORAL at 20:44

## 2023-02-18 RX ADMIN — VITAMINS A AND D OINTMENT 1 APPLICATION: 15.5; 53.4 OINTMENT TOPICAL at 20:44

## 2023-02-18 RX ADMIN — SUCRALFATE 1 G: 1 TABLET ORAL at 08:43

## 2023-02-18 RX ADMIN — BUMETANIDE 2 MG: 1 TABLET ORAL at 08:43

## 2023-02-18 RX ADMIN — INSULIN DETEMIR 55 UNITS: 100 INJECTION, SOLUTION SUBCUTANEOUS at 20:43

## 2023-02-18 RX ADMIN — INSULIN ASPART 12 UNITS: 100 INJECTION, SOLUTION INTRAVENOUS; SUBCUTANEOUS at 11:58

## 2023-02-18 RX ADMIN — MODAFINIL 200 MG: 100 TABLET ORAL at 08:56

## 2023-02-18 RX ADMIN — APIXABAN 5 MG: 5 TABLET, FILM COATED ORAL at 08:42

## 2023-02-18 RX ADMIN — GABAPENTIN 800 MG: 400 CAPSULE ORAL at 17:40

## 2023-02-18 RX ADMIN — INSULIN ASPART 25 UNITS: 100 INJECTION, SOLUTION INTRAVENOUS; SUBCUTANEOUS at 11:59

## 2023-02-18 RX ADMIN — DICYCLOMINE HYDROCHLORIDE 10 MG: 10 CAPSULE ORAL at 08:43

## 2023-02-18 RX ADMIN — Medication 1000 UNITS: at 08:43

## 2023-02-18 NOTE — PLAN OF CARE
Goal Outcome Evaluation:   Patient resting comfortably at this time. No reports of any pain to R AKA. Tolerating oxygen on RA well. Both the colostomy and urostomy stromas pink and patent. HOB elevated. Bed alarm on and Call bell in reach. Will continue to follow the POC.
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
## 2023-02-18 NOTE — PLAN OF CARE
Goal Outcome Evaluation:      Wound care completed per nurse practitioner's note. Patient is lying in bed with no S&S of distress. No complaints at this time. Will continue to follow plan of care.

## 2023-02-18 NOTE — PROGRESS NOTES
PROGRESS NOTE         Patient Identification:  Name:  Ken Krueger  Age:  47 y.o.  Sex:  male  :  1975  MRN:  [id number]  Visit Number:  [id number]  Primary Care Provider:  Franchesca Hodges APRN         LOS: 6 days       ----------------------------------------------------------------------------------------------------------------------  Subjective       Chief Complaints:    Wound Check        Interval History:      Patient is resting comfortably in bed on CPAP this morning, but is typically on room air during the day.  No new issues or complaints at this time.  Lungs are clear to auscultation bilaterally.  Abdomen is soft, nontender, normal bowel sounds.  1+ edema of right lower extremity.  Afebrile, no increased output from ostomy.  No new labs today.    Review of Systems:    Constitutional: no fever, chills and night sweats.  No fatigue.  Eyes: no eye drainage, itching or redness.  HEENT: no mouth sores, dysphagia or nose bleed.  Respiratory: no for shortness of breath, cough or production of sputum.  Cardiovascular: no chest pain, no palpitations, no orthopnea.  Gastrointestinal: no nausea, vomiting or diarrhea. No abdominal pain, hematemesis or rectal bleeding.  Genitourinary: no dysuria or polyuria.  Hematologic/lymphatic: no lymph node abnormalities, no easy bruising or easy bleeding.  Musculoskeletal: no muscle or joint pain.  Skin: No rash and no itching.  Decubitus ulcers.  Neurological: no loss of consciousness, no seizure, no headache.  Behavioral/Psych: no depression or suicidal ideation.  Endocrine: no hot flashes.  Immunologic: negative.    ----------------------------------------------------------------------------------------------------------------------      Objective       [facility] Meds:  Acidophilus/Pectin, 1 capsule, Oral, Daily  apixaban, 5 mg, Oral, Q12H  ARIPiprazole, 10 mg, Oral, Nightly  ascorbic acid, 500 mg, Oral, Daily  atorvastatin, 10 mg, Oral,  Nightly  baclofen, 30 mg, Oral, 4x Daily With Meals & Nightly  bumetanide, 2 mg, Oral, Daily  carvedilol, 12.5 mg, Oral, BID With Meals  cefTRIAXone, 2 g, Intravenous, Q24H  cholecalciferol, 1,000 Units, Oral, Daily  dicyclomine, 10 mg, Oral, BID  DULoxetine, 60 mg, Oral, BID  ferrous sulfate, 325 mg, Oral, Daily With Breakfast  gabapentin, 800 mg, Oral, TID  Insulin Aspart, 0-14 Units, Subcutaneous, TID AC  Insulin Aspart, 25 Units, Subcutaneous, TID With Meals  insulin detemir, 55 Units, Subcutaneous, Q12H  magic barrier cream, 1 application, Topical, BID  magnesium oxide, 1,200 mg, Oral, Daily  modafinil, 200 mg, Oral, Daily  multivitamin with minerals, 1 tablet, Oral, Daily  nystatin, , Topical, Q12H  pantoprazole, 40 mg, Oral, BID  sacubitril-valsartan, 1 tablet, Oral, Q12H  silver nitrate, 1 application, Topical, Once  sodium chloride, 10 mL, Intravenous, Q12H  spironolactone, 25 mg, Oral, Daily  sucralfate, 1 g, Oral, Daily  Vericiguat, 2.5 mg, Oral, Daily      Pharmacy Consult - Pharmacy to dose,   Pharmacy Consult - Pharmacy to dose,       ----------------------------------------------------------------------------------------------------------------------    Vital Signs:  Temp:  [97 °F (36.1 °C)-98.3 °F (36.8 °C)] 98.3 °F (36.8 °C)  Heart Rate:  [66-88] 80  Resp:  [18] 18  BP: (110-150)/(64-89) 150/89  Mean Arterial Pressure (Non-Invasive) for the past 24 hrs (Last 3 readings):   Noninvasive MAP (mmHg)   02/18/23 0600 107   02/18/23 0330 82   02/17/23 2346 83     SpO2 Percentage    02/17/23 2346 02/18/23 0330 02/18/23 0600   SpO2: 96% 95% 92%     SpO2:  [92 %-96 %] 92 %  on   ;   Device (Oxygen Therapy): room air    Body mass index is 43.21 kg/m².  Wt Readings from Last 3 Encounters:   02/18/23 (!) 141 kg (309 lb 12.8 oz)   12/13/22 (!) 141 kg (310 lb 14.4 oz)   12/02/22 (!) 150 kg (330 lb)        Intake/Output Summary (Last 24 hours) at 2/18/2023 1017  Last data filed at 2/18/2023 0400  Gross per 24 hour    Intake 330 ml   Output 2600 ml   Net -2270 ml     Diet: Diabetic Diets; Consistent Carbohydrate; Texture: Regular Texture (IDDSI 7); Fluid Consistency: Thin (IDDSI 0)  ----------------------------------------------------------------------------------------------------------------------      Physical Exam:    Constitutional: Very pleasant obese male is sitting up in bed on CPAP in no apparent distress.  HENT:  Head: Normocephalic and atraumatic.  Mouth:  Moist mucous membranes.    Eyes:  Conjunctivae and EOM are normal.  No scleral icterus.  Neck:  Neck supple.  No JVD present.    Cardiovascular:  Normal rate, regular rhythm and normal heart sounds with no murmur. No edema.  Pulmonary/Chest:  No respiratory distress, no wheezes, no crackles, with normal breath sounds and good air movement.  Lungs clear to auscultation bilaterally  Abdominal: Obese.  Soft.  Bowel sounds are normal.  No distension and no tenderness.  Ileal conduit and colostomy bag in place.  Musculoskeletal:  No edema and no tenderness.  No swelling or redness of joints.  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  Neurological:  Alert and oriented to person, place, and time.  No facial droop.  No slurred speech.   Skin:  Stage IV sacral decubitus wound status post excisional debridement.  Psychiatric:  Normal mood and affect.  Behavior is normal.      ----------------------------------------------------------------------------------------------------------------------            Results from last 7 days   Lab Units 02/17/23  0240 02/16/23  0056 02/13/23  1852 02/13/23  1215 02/13/23  0622 02/13/23  0302 02/12/23  2331 02/12/23  2106   CRP mg/dL  --   --   --   --   --   --   --  9.14*   LACTATE mmol/L  --   --  1.6 2.2* 2.6* 2.6*   < > 4.9*   WBC 10*3/mm3 8.30 8.28  --   --   --  13.35*  --  12.75*   HEMOGLOBIN g/dL 10.7* 10.6*  --   --   --  11.4*  --  12.6*   HEMATOCRIT % 36.3* 36.1*  --   --   --  37.6  --  41.4   MCV fL 91.0 90.9  --   --   --  86.4  --  87.9    MCHC g/dL 29.5* 29.4*  --   --   --  30.3*  --  30.4*   PLATELETS 10*3/mm3 206 337  --   --   --  322  --  439    < > = values in this interval not displayed.     Results from last 7 days   Lab Units 02/17/23  0240 02/16/23  0056 02/15/23  1153 02/15/23  0122 02/13/23  0302 02/12/23  2106   SODIUM mmol/L 131* 133*  --  133* 133* 131*   POTASSIUM mmol/L 3.6 3.9 4.0 3.2* 3.3* 3.3*   MAGNESIUM mg/dL  --   --   --   --  1.6  --    CHLORIDE mmol/L 96* 97*  --  96* 93* 87*   CO2 mmol/L 25.6 25.0  --  29.2* 23.3 28.6   BUN mg/dL 21* 21*  --  21* 31* 30*   CREATININE mg/dL 1.08 0.86  --  0.87 1.19 1.34*   CALCIUM mg/dL 9.0 8.5*  --  8.7 8.4* 9.5   GLUCOSE mg/dL 382* 536*  --  398* 398* 519*   ALBUMIN g/dL  --   --   --   --  3.1* 3.6   BILIRUBIN mg/dL  --   --   --   --  0.3 0.2   ALK PHOS U/L  --   --   --   --  108 123*   AST (SGOT) U/L  --   --   --   --  36 48*   ALT (SGPT) U/L  --   --   --   --  33 42*   Estimated Creatinine Clearance: 124.6 mL/min (by C-G formula based on SCr of 1.08 mg/dL).  No results found for: AMMONIA    Glucose   Date/Time Value Ref Range Status   02/18/2023 0623 388 (H) 70 - 130 mg/dL Final     Comment:     Meter: [id number] : [id number] DEVIN GALEAS   02/17/2023 1918 377 (H) 70 - 130 mg/dL Final     Comment:     RN Notified Meter: [id number] : [id number] OBDULIA CASTRO   02/17/2023 1608 356 (H) 70 - 130 mg/dL Final     Comment:     Meter: [id number] : [id number] MARY JOEL   02/17/2023 1037 393 (H) 70 - 130 mg/dL Final     Comment:     Meter: [id number] : [id number] CLAUDE MCDANIEL   02/17/2023 0623 326 (H) 70 - 130 mg/dL Final     Comment:     Meter: [id number] : [id number] CLAUDE MCDANIEL   02/17/2023 0142 417 (C) 70 - 130 mg/dL Final     Comment:     RN Notified Meter: [id number] : [id number] NEIL SANCHEZ   02/16/2023 2349 433 (C) 70 - 130 mg/dL Final     Comment:     RN Notified Meter: [id number] : [id number] UNIQUE FAN   02/16/2023 2143 511 (C) 70 - 130 mg/dL  Final     Comment:     RN Notified Meter: [id number] : [id number] OBDULIA CASTRO     Lab Results   Component Value Date    HGBA1C 11.50 (H) 02/13/2023     Lab Results   Component Value Date    TSH 3.780 07/19/2022    FREET4 1.31 06/02/2021       Blood Culture   Date Value Ref Range Status   02/12/2023 No growth at 24 hours  Preliminary   02/12/2023 No growth at 24 hours  Preliminary     No results found for: URINECX  No results found for: WOUNDCX  No results found for: STOOLCX  No results found for: RESPCX  Pain Management Panel       Pain Management Panel Latest Ref Rng & Units 6/2/2021 8/19/2018    AMPHETAMINES SCREEN, URINE Negative Negative Negative    BARBITURATES SCREEN Negative Negative Negative    BENZODIAZEPINE SCREEN, URINE Negative Negative Negative    BUPRENORPHINEUR Negative Negative Negative    COCAINE SCREEN, URINE Negative Negative Negative    METHADONE SCREEN, URINE Negative Negative Negative              ----------------------------------------------------------------------------------------------------------------------  Imaging Results (Last 24 Hours)       ** No results found for the last 24 hours. **            -----------------------------------------------------------------------------------  Pertinent Infectious Disease Results     CT abdomen pelvis with contrast on 2/12/2023 showed there is a new decubitus ulcer just to the right of midline at the level of the coccyx with constellation of imaging findings most characteristic of acute infection/osteomyelitis. Small pocket of air and fluid adjacent to the coccyx measuring 3.7 x 4.1 cm could reflect phlegmon or abscess. Chronic bilateral decubitus ulcers at the level of the ischial tuberosities bilaterally with imaging findings suggestive of chronic infection/osteomyelitis, similar to prior. Hepatic steatosis and hepatomegaly with borderline splenomegaly. Nonobstructing left renal stones. Postoperative changes of left lower quadrant and  colostomy and right mid abdominal ileal conduit formation. Diverticulosis. No diverticulitis or bowel obstruction. COVID-19 and flu A/B PCR on 2/12/2023 was negative.    Status post excisional debridement of sacral ulcer on 2/13/2023 with Dr. Taylor.       Assessment/Plan         ASSESSMENT:    Septic shock with lactic acid greater than 4 on admission  Acute and chronic decubitus ulcerations with osteomyelitis and possible abscess      PLAN:    I examined the patient this morning and he is resting comfortably in bed on CPAP this morning, but is typically on room air during the day.  No new issues or complaints at this time.  Lungs are clear to auscultation bilaterally.  Abdomen is soft, nontender, normal bowel sounds.  1+ edema of right lower extremity.  Afebrile, no increased output from ostomy.  No new labs today.    Based on bone and tissue culture finalization with E. coli, recommend to continue on ceftriaxone through 3/29/2023 for treatment of osteomyelitis.    Code Status:   Code Status and Medical Interventions:   Ordered at: 02/12/23 2326     Code Status (Patient has no pulse and is not breathing):    CPR (Attempt to Resuscitate)     Medical Interventions (Patient has pulse or is breathing):    Full Support       Christy Caro PA-C  02/18/23  10:17 EST

## 2023-02-19 LAB
GLUCOSE BLDC GLUCOMTR-MCNC: 290 MG/DL (ref 70–130)
GLUCOSE BLDC GLUCOMTR-MCNC: 315 MG/DL (ref 70–130)
GLUCOSE BLDC GLUCOMTR-MCNC: 397 MG/DL (ref 70–130)
GLUCOSE BLDC GLUCOMTR-MCNC: 401 MG/DL (ref 70–130)

## 2023-02-19 PROCEDURE — 25010000002 CEFTRIAXONE PER 250 MG: Performed by: NURSE PRACTITIONER

## 2023-02-19 PROCEDURE — 63710000001 INSULIN ASPART PER 5 UNITS: Performed by: HOSPITALIST

## 2023-02-19 PROCEDURE — 99232 SBSQ HOSP IP/OBS MODERATE 35: CPT | Performed by: HOSPITALIST

## 2023-02-19 PROCEDURE — 82962 GLUCOSE BLOOD TEST: CPT

## 2023-02-19 PROCEDURE — 63710000001 INSULIN DETEMIR PER 5 UNITS: Performed by: HOSPITALIST

## 2023-02-19 PROCEDURE — 99232 SBSQ HOSP IP/OBS MODERATE 35: CPT | Performed by: PHYSICIAN ASSISTANT

## 2023-02-19 RX ORDER — INSULIN ASPART 100 [IU]/ML
35 INJECTION, SOLUTION INTRAVENOUS; SUBCUTANEOUS
Status: DISCONTINUED | OUTPATIENT
Start: 2023-02-19 | End: 2023-02-21 | Stop reason: HOSPADM

## 2023-02-19 RX ADMIN — DICYCLOMINE HYDROCHLORIDE 10 MG: 10 CAPSULE ORAL at 21:43

## 2023-02-19 RX ADMIN — Medication 1 TABLET: at 09:43

## 2023-02-19 RX ADMIN — MODAFINIL 200 MG: 100 TABLET ORAL at 09:40

## 2023-02-19 RX ADMIN — NYSTATIN: 100000 POWDER TOPICAL at 09:44

## 2023-02-19 RX ADMIN — GABAPENTIN 800 MG: 400 CAPSULE ORAL at 16:15

## 2023-02-19 RX ADMIN — INSULIN DETEMIR 55 UNITS: 100 INJECTION, SOLUTION SUBCUTANEOUS at 21:42

## 2023-02-19 RX ADMIN — DULOXETINE HYDROCHLORIDE 60 MG: 60 CAPSULE, DELAYED RELEASE ORAL at 21:43

## 2023-02-19 RX ADMIN — FERROUS SULFATE TAB 325 MG (65 MG ELEMENTAL FE) 325 MG: 325 (65 FE) TAB at 09:42

## 2023-02-19 RX ADMIN — ATORVASTATIN CALCIUM 10 MG: 10 TABLET, FILM COATED ORAL at 21:43

## 2023-02-19 RX ADMIN — GABAPENTIN 800 MG: 400 CAPSULE ORAL at 09:41

## 2023-02-19 RX ADMIN — INSULIN ASPART 35 UNITS: 100 INJECTION, SOLUTION INTRAVENOUS; SUBCUTANEOUS at 11:57

## 2023-02-19 RX ADMIN — BACLOFEN 30 MG: 10 TABLET ORAL at 21:43

## 2023-02-19 RX ADMIN — PANTOPRAZOLE SODIUM 40 MG: 40 TABLET, DELAYED RELEASE ORAL at 21:43

## 2023-02-19 RX ADMIN — VITAMINS A AND D OINTMENT 1 APPLICATION: 15.5; 53.4 OINTMENT TOPICAL at 09:44

## 2023-02-19 RX ADMIN — BACLOFEN 30 MG: 10 TABLET ORAL at 09:42

## 2023-02-19 RX ADMIN — Medication 10 ML: at 09:44

## 2023-02-19 RX ADMIN — INSULIN ASPART 12 UNITS: 100 INJECTION, SOLUTION INTRAVENOUS; SUBCUTANEOUS at 11:57

## 2023-02-19 RX ADMIN — SACUBITRIL AND VALSARTAN 1 TABLET: 24; 26 TABLET, FILM COATED ORAL at 09:43

## 2023-02-19 RX ADMIN — Medication 1000 UNITS: at 09:43

## 2023-02-19 RX ADMIN — INSULIN ASPART 14 UNITS: 100 INJECTION, SOLUTION INTRAVENOUS; SUBCUTANEOUS at 09:41

## 2023-02-19 RX ADMIN — SPIRONOLACTONE 25 MG: 25 TABLET ORAL at 09:43

## 2023-02-19 RX ADMIN — INSULIN ASPART 25 UNITS: 100 INJECTION, SOLUTION INTRAVENOUS; SUBCUTANEOUS at 09:42

## 2023-02-19 RX ADMIN — DULOXETINE HYDROCHLORIDE 60 MG: 60 CAPSULE, DELAYED RELEASE ORAL at 09:43

## 2023-02-19 RX ADMIN — ARIPIPRAZOLE 10 MG: 10 TABLET ORAL at 21:43

## 2023-02-19 RX ADMIN — BACLOFEN 30 MG: 10 TABLET ORAL at 17:46

## 2023-02-19 RX ADMIN — SACUBITRIL AND VALSARTAN 1 TABLET: 24; 26 TABLET, FILM COATED ORAL at 21:43

## 2023-02-19 RX ADMIN — DICYCLOMINE HYDROCHLORIDE 10 MG: 10 CAPSULE ORAL at 09:41

## 2023-02-19 RX ADMIN — VITAMINS A AND D OINTMENT 1 APPLICATION: 15.5; 53.4 OINTMENT TOPICAL at 21:44

## 2023-02-19 RX ADMIN — INSULIN ASPART 35 UNITS: 100 INJECTION, SOLUTION INTRAVENOUS; SUBCUTANEOUS at 17:46

## 2023-02-19 RX ADMIN — SUCRALFATE 1 G: 1 TABLET ORAL at 09:44

## 2023-02-19 RX ADMIN — INSULIN ASPART 8 UNITS: 100 INJECTION, SOLUTION INTRAVENOUS; SUBCUTANEOUS at 17:46

## 2023-02-19 RX ADMIN — CEFTRIAXONE 2 G: 2 INJECTION, POWDER, FOR SOLUTION INTRAMUSCULAR; INTRAVENOUS at 09:44

## 2023-02-19 RX ADMIN — NYSTATIN: 100000 POWDER TOPICAL at 21:43

## 2023-02-19 RX ADMIN — GABAPENTIN 800 MG: 400 CAPSULE ORAL at 21:46

## 2023-02-19 RX ADMIN — INSULIN DETEMIR 55 UNITS: 100 INJECTION, SOLUTION SUBCUTANEOUS at 09:41

## 2023-02-19 RX ADMIN — APIXABAN 5 MG: 5 TABLET, FILM COATED ORAL at 21:43

## 2023-02-19 RX ADMIN — Medication 10 ML: at 21:42

## 2023-02-19 RX ADMIN — PANTOPRAZOLE SODIUM 40 MG: 40 TABLET, DELAYED RELEASE ORAL at 09:43

## 2023-02-19 RX ADMIN — BUMETANIDE 2 MG: 1 TABLET ORAL at 09:44

## 2023-02-19 RX ADMIN — Medication 1 CAPSULE: at 09:44

## 2023-02-19 RX ADMIN — CARVEDILOL 12.5 MG: 6.25 TABLET, FILM COATED ORAL at 17:46

## 2023-02-19 RX ADMIN — APIXABAN 5 MG: 5 TABLET, FILM COATED ORAL at 09:43

## 2023-02-19 RX ADMIN — OXYCODONE HYDROCHLORIDE AND ACETAMINOPHEN 500 MG: 500 TABLET ORAL at 09:43

## 2023-02-19 RX ADMIN — BACLOFEN 30 MG: 10 TABLET ORAL at 11:57

## 2023-02-19 RX ADMIN — MAGNESIUM GLUCONATE 500 MG ORAL TABLET 1200 MG: 500 TABLET ORAL at 09:43

## 2023-02-19 RX ADMIN — CARVEDILOL 12.5 MG: 6.25 TABLET, FILM COATED ORAL at 09:42

## 2023-02-19 RX ADMIN — VERICIGUAT 2.5 MG: 2.5 TABLET, FILM COATED ORAL at 09:41

## 2023-02-19 NOTE — PLAN OF CARE
Goal Outcome Evaluation:    Patient resting comfortably at this time. Tolerating the oxygen well on RA No s/s of any acute distress. HOB elevated, bed alarm on and call bell in reach. Will continue to follow the POC.

## 2023-02-19 NOTE — PROGRESS NOTES
PROGRESS NOTE         Patient Identification:  Name:  Ken Krueger  Age:  47 y.o.  Sex:  male  :  1975  MRN:  [id number]  Visit Number:  [id number]  Primary Care Provider:  Franchesca Hodges APRN         LOS: 7 days       ----------------------------------------------------------------------------------------------------------------------  Subjective       Chief Complaints:    Wound Check        Interval History:      Patient is sitting up in bed on room air in no apparent distress.  No new issues or complaints at this time.  Lungs are clear to auscultation bilaterally.  Abdomen is soft, nontender, normal bowel sounds.  1+ edema of right lower extremity. Afebrile, no increased output from ostomy.  No new labs today.    Review of Systems:    Constitutional: no fever, chills and night sweats.  No fatigue.  Eyes: no eye drainage, itching or redness.  HEENT: no mouth sores, dysphagia or nose bleed.  Respiratory: no for shortness of breath, cough or production of sputum.  Cardiovascular: no chest pain, no palpitations, no orthopnea.  Gastrointestinal: no nausea, vomiting or diarrhea. No abdominal pain, hematemesis or rectal bleeding.  Genitourinary: no dysuria or polyuria.  Hematologic/lymphatic: no lymph node abnormalities, no easy bruising or easy bleeding.  Musculoskeletal: no muscle or joint pain.  Skin: No rash and no itching.  Decubitus ulcers.  Neurological: no loss of consciousness, no seizure, no headache.  Behavioral/Psych: no depression or suicidal ideation.  Endocrine: no hot flashes.  Immunologic: negative.    ----------------------------------------------------------------------------------------------------------------------      Objective       [facility]ospitals Meds:  Acidophilus/Pectin, 1 capsule, Oral, Daily  apixaban, 5 mg, Oral, Q12H  ARIPiprazole, 10 mg, Oral, Nightly  ascorbic acid, 500 mg, Oral, Daily  atorvastatin, 10 mg, Oral, Nightly  baclofen, 30 mg, Oral, 4x Daily With Meals &  Nightly  bumetanide, 2 mg, Oral, Daily  carvedilol, 12.5 mg, Oral, BID With Meals  cefTRIAXone, 2 g, Intravenous, Q24H  cholecalciferol, 1,000 Units, Oral, Daily  dicyclomine, 10 mg, Oral, BID  DULoxetine, 60 mg, Oral, BID  ferrous sulfate, 325 mg, Oral, Daily With Breakfast  gabapentin, 800 mg, Oral, TID  Insulin Aspart, 0-14 Units, Subcutaneous, TID AC  Insulin Aspart, 35 Units, Subcutaneous, TID With Meals  insulin detemir, 55 Units, Subcutaneous, Q12H  magic barrier cream, 1 application, Topical, BID  magnesium oxide, 1,200 mg, Oral, Daily  modafinil, 200 mg, Oral, Daily  multivitamin with minerals, 1 tablet, Oral, Daily  nystatin, , Topical, Q12H  pantoprazole, 40 mg, Oral, BID  sacubitril-valsartan, 1 tablet, Oral, Q12H  silver nitrate, 1 application, Topical, Once  sodium chloride, 10 mL, Intravenous, Q12H  spironolactone, 25 mg, Oral, Daily  sucralfate, 1 g, Oral, Daily  Vericiguat, 2.5 mg, Oral, Daily      Pharmacy Consult - Pharmacy to dose,   Pharmacy Consult - Pharmacy to dose,       ----------------------------------------------------------------------------------------------------------------------    Vital Signs:  Temp:  [97.7 °F (36.5 °C)-98.5 °F (36.9 °C)] 98.4 °F (36.9 °C)  Heart Rate:  [82-98] 88  Resp:  [18-20] 20  BP: (100-138)/(62-86) 119/70  Mean Arterial Pressure (Non-Invasive) for the past 24 hrs (Last 3 readings):   Noninvasive MAP (mmHg)   02/19/23 0600 92   02/19/23 0248 82   02/18/23 2327 93     SpO2 Percentage    02/18/23 2327 02/19/23 0248 02/19/23 0600   SpO2: 97% 95% 95%     SpO2:  [95 %-98 %] 95 %  on   ;   Device (Oxygen Therapy): room air    Body mass index is 42.99 kg/m².  Wt Readings from Last 3 Encounters:   02/19/23 (!) 140 kg (308 lb 3.2 oz)   12/13/22 (!) 141 kg (310 lb 14.4 oz)   12/02/22 (!) 150 kg (330 lb)        Intake/Output Summary (Last 24 hours) at 2/19/2023 1009  Last data filed at 2/19/2023 0400  Gross per 24 hour   Intake 1360 ml   Output 3550 ml   Net -2190 ml      Diet: Diabetic Diets; Consistent Carbohydrate; Texture: Regular Texture (IDDSI 7); Fluid Consistency: Thin (IDDSI 0)  ----------------------------------------------------------------------------------------------------------------------      Physical Exam:    Constitutional: Very pleasant obese male is sitting up in bed on room air in no apparent distress.  Appears comfortable.  HENT:  Head: Normocephalic and atraumatic.  Mouth:  Moist mucous membranes.    Eyes:  Conjunctivae and EOM are normal.  No scleral icterus.  Neck:  Neck supple.  No JVD present.    Cardiovascular:  Normal rate, regular rhythm and normal heart sounds with no murmur. No edema.  Pulmonary/Chest:  No respiratory distress, no wheezes, no crackles, with normal breath sounds and good air movement.  Lungs clear to auscultation bilaterally  Abdominal: Obese.  Soft.  Bowel sounds are normal.  No distension and no tenderness.  Ileal conduit and colostomy bag in place.  Musculoskeletal:  No tenderness.  No swelling or redness of joints.  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  1+ edema of right lower extremity.  Neurological:  Alert and oriented to person, place, and time.  No facial droop.  No slurred speech.   Skin:  Stage IV sacral decubitus wound status post excisional debridement.  Psychiatric:  Normal mood and affect.  Behavior is normal.      ----------------------------------------------------------------------------------------------------------------------            Results from last 7 days   Lab Units 02/17/23  0240 02/16/23  0056 02/13/23  1852 02/13/23  1215 02/13/23  0622 02/13/23  0302 02/12/23  2331 02/12/23  2106   CRP mg/dL  --   --   --   --   --   --   --  9.14*   LACTATE mmol/L  --   --  1.6 2.2* 2.6* 2.6*   < > 4.9*   WBC 10*3/mm3 8.30 8.28  --   --   --  13.35*  --  12.75*   HEMOGLOBIN g/dL 10.7* 10.6*  --   --   --  11.4*  --  12.6*   HEMATOCRIT % 36.3* 36.1*  --   --   --  37.6  --  41.4   MCV fL 91.0 90.9  --   --   --  86.4  --  87.9    MCHC g/dL 29.5* 29.4*  --   --   --  30.3*  --  30.4*   PLATELETS 10*3/mm3 206 337  --   --   --  322  --  439    < > = values in this interval not displayed.     Results from last 7 days   Lab Units 02/17/23  0240 02/16/23  0056 02/15/23  1153 02/15/23  0122 02/13/23  0302 02/12/23  2106   SODIUM mmol/L 131* 133*  --  133* 133* 131*   POTASSIUM mmol/L 3.6 3.9 4.0 3.2* 3.3* 3.3*   MAGNESIUM mg/dL  --   --   --   --  1.6  --    CHLORIDE mmol/L 96* 97*  --  96* 93* 87*   CO2 mmol/L 25.6 25.0  --  29.2* 23.3 28.6   BUN mg/dL 21* 21*  --  21* 31* 30*   CREATININE mg/dL 1.08 0.86  --  0.87 1.19 1.34*   CALCIUM mg/dL 9.0 8.5*  --  8.7 8.4* 9.5   GLUCOSE mg/dL 382* 536*  --  398* 398* 519*   ALBUMIN g/dL  --   --   --   --  3.1* 3.6   BILIRUBIN mg/dL  --   --   --   --  0.3 0.2   ALK PHOS U/L  --   --   --   --  108 123*   AST (SGOT) U/L  --   --   --   --  36 48*   ALT (SGPT) U/L  --   --   --   --  33 42*   Estimated Creatinine Clearance: 123.4 mL/min (by C-G formula based on SCr of 1.08 mg/dL).  No results found for: AMMONIA    Glucose   Date/Time Value Ref Range Status   02/19/2023 0817 401 (C) 70 - 130 mg/dL Final     Comment:     Confirmed by Repeat Meter: [id number] : [id number] ROSA MARIA FLORES   02/18/2023 1900 380 (H) 70 - 130 mg/dL Final     Comment:     RN Notified Meter: [id number] : [id number] OBDULIA CASTRO   02/18/2023 1555 355 (H) 70 - 130 mg/dL Final     Comment:     Meter: [id number] : [id number] DEVIN GALEAS   02/18/2023 1115 393 (H) 70 - 130 mg/dL Final     Comment:     Meter: [id number] : [id number] FRANCOISE MEHTA   02/18/2023 0623 388 (H) 70 - 130 mg/dL Final     Comment:     Meter: [id number] : [id number] DEVIN GALEAS   02/17/2023 1918 377 (H) 70 - 130 mg/dL Final     Comment:     RN Notified Meter: [id number] : [id number] OBDULIA CASTRO   02/17/2023 1608 356 (H) 70 - 130 mg/dL Final     Comment:     Meter: [id number] : [id number] MARY JOEL   02/17/2023 1037 393 (H) 70 - 130  mg/dL Final     Comment:     Meter: [id number] : [id number] CLAUDE MCDANIEL     Lab Results   Component Value Date    HGBA1C 11.50 (H) 02/13/2023     Lab Results   Component Value Date    TSH 3.780 07/19/2022    FREET4 1.31 06/02/2021       Blood Culture   Date Value Ref Range Status   02/12/2023 No growth at 24 hours  Preliminary   02/12/2023 No growth at 24 hours  Preliminary     No results found for: URINECX  No results found for: WOUNDCX  No results found for: STOOLCX  No results found for: RESPCX  Pain Management Panel       Pain Management Panel Latest Ref Rng & Units 6/2/2021 8/19/2018    AMPHETAMINES SCREEN, URINE Negative Negative Negative    BARBITURATES SCREEN Negative Negative Negative    BENZODIAZEPINE SCREEN, URINE Negative Negative Negative    BUPRENORPHINEUR Negative Negative Negative    COCAINE SCREEN, URINE Negative Negative Negative    METHADONE SCREEN, URINE Negative Negative Negative              ----------------------------------------------------------------------------------------------------------------------  Imaging Results (Last 24 Hours)       ** No results found for the last 24 hours. **            -----------------------------------------------------------------------------------  Pertinent Infectious Disease Results     CT abdomen pelvis with contrast on 2/12/2023 showed there is a new decubitus ulcer just to the right of midline at the level of the coccyx with constellation of imaging findings most characteristic of acute infection/osteomyelitis. Small pocket of air and fluid adjacent to the coccyx measuring 3.7 x 4.1 cm could reflect phlegmon or abscess. Chronic bilateral decubitus ulcers at the level of the ischial tuberosities bilaterally with imaging findings suggestive of chronic infection/osteomyelitis, similar to prior. Hepatic steatosis and hepatomegaly with borderline splenomegaly. Nonobstructing left renal stones. Postoperative changes of left lower quadrant and colostomy  and right mid abdominal ileal conduit formation. Diverticulosis. No diverticulitis or bowel obstruction. COVID-19 and flu A/B PCR on 2/12/2023 was negative.    Status post excisional debridement of sacral ulcer on 2/13/2023 with Dr. Taylor.       Assessment/Plan         ASSESSMENT:    Septic shock with lactic acid greater than 4 on admission  Acute and chronic decubitus ulcerations with osteomyelitis and possible abscess      PLAN:    I examined the patient this morning and he is sitting up in bed on room air in no apparent distress.  No new issues or complaints at this time.  Lungs are clear to auscultation bilaterally.  Abdomen is soft, nontender, normal bowel sounds.  1+ edema of right lower extremity. Afebrile, no increased output from ostomy.  No new labs today.    Based on bone and tissue culture finalization with E. coli, recommend to continue on ceftriaxone through 3/29/2023 for treatment of osteomyelitis.    Code Status:   Code Status and Medical Interventions:   Ordered at: 02/12/23 2326     Code Status (Patient has no pulse and is not breathing):    CPR (Attempt to Resuscitate)     Medical Interventions (Patient has pulse or is breathing):    Full Support       Christy Caro PA-C  02/19/23  10:09 EST

## 2023-02-19 NOTE — PLAN OF CARE
Goal Outcome Evaluation:  Plan of Care Reviewed With: patient        Progress: no change  Outcome Evaluation: Pt resting in bed at this time. Pt has worn CPAP while sleeping this shift. Vital signs stable. No complaints or concerns at this time.

## 2023-02-20 LAB
ANION GAP SERPL CALCULATED.3IONS-SCNC: 9.5 MMOL/L (ref 5–15)
BUN SERPL-MCNC: 24 MG/DL (ref 6–20)
BUN/CREAT SERPL: 30 (ref 7–25)
CALCIUM SPEC-SCNC: 9.3 MG/DL (ref 8.6–10.5)
CHLORIDE SERPL-SCNC: 98 MMOL/L (ref 98–107)
CO2 SERPL-SCNC: 26.5 MMOL/L (ref 22–29)
CREAT SERPL-MCNC: 0.8 MG/DL (ref 0.76–1.27)
EGFRCR SERPLBLD CKD-EPI 2021: 111.2 ML/MIN/1.73
GLUCOSE BLDC GLUCOMTR-MCNC: 190 MG/DL (ref 70–130)
GLUCOSE BLDC GLUCOMTR-MCNC: 204 MG/DL (ref 70–130)
GLUCOSE BLDC GLUCOMTR-MCNC: 312 MG/DL (ref 70–130)
GLUCOSE BLDC GLUCOMTR-MCNC: 339 MG/DL (ref 70–130)
GLUCOSE SERPL-MCNC: 401 MG/DL (ref 65–99)
POTASSIUM SERPL-SCNC: 4 MMOL/L (ref 3.5–5.2)
SODIUM SERPL-SCNC: 134 MMOL/L (ref 136–145)

## 2023-02-20 PROCEDURE — 63710000001 INSULIN DETEMIR PER 5 UNITS: Performed by: STUDENT IN AN ORGANIZED HEALTH CARE EDUCATION/TRAINING PROGRAM

## 2023-02-20 PROCEDURE — 82962 GLUCOSE BLOOD TEST: CPT

## 2023-02-20 PROCEDURE — 80048 BASIC METABOLIC PNL TOTAL CA: CPT | Performed by: STUDENT IN AN ORGANIZED HEALTH CARE EDUCATION/TRAINING PROGRAM

## 2023-02-20 PROCEDURE — 63710000001 INSULIN ASPART PER 5 UNITS: Performed by: HOSPITALIST

## 2023-02-20 PROCEDURE — 25010000002 CEFTRIAXONE PER 250 MG: Performed by: NURSE PRACTITIONER

## 2023-02-20 PROCEDURE — 99233 SBSQ HOSP IP/OBS HIGH 50: CPT | Performed by: STUDENT IN AN ORGANIZED HEALTH CARE EDUCATION/TRAINING PROGRAM

## 2023-02-20 PROCEDURE — 63710000001 INSULIN DETEMIR PER 5 UNITS: Performed by: HOSPITALIST

## 2023-02-20 PROCEDURE — 99232 SBSQ HOSP IP/OBS MODERATE 35: CPT | Performed by: INTERNAL MEDICINE

## 2023-02-20 RX ADMIN — DICYCLOMINE HYDROCHLORIDE 10 MG: 10 CAPSULE ORAL at 09:32

## 2023-02-20 RX ADMIN — DULOXETINE HYDROCHLORIDE 60 MG: 60 CAPSULE, DELAYED RELEASE ORAL at 22:21

## 2023-02-20 RX ADMIN — SACUBITRIL AND VALSARTAN 1 TABLET: 24; 26 TABLET, FILM COATED ORAL at 22:21

## 2023-02-20 RX ADMIN — Medication 1000 UNITS: at 09:32

## 2023-02-20 RX ADMIN — MAGNESIUM GLUCONATE 500 MG ORAL TABLET 1200 MG: 500 TABLET ORAL at 09:31

## 2023-02-20 RX ADMIN — Medication 10 ML: at 09:33

## 2023-02-20 RX ADMIN — OXYCODONE HYDROCHLORIDE AND ACETAMINOPHEN 500 MG: 500 TABLET ORAL at 09:31

## 2023-02-20 RX ADMIN — DICYCLOMINE HYDROCHLORIDE 10 MG: 10 CAPSULE ORAL at 22:21

## 2023-02-20 RX ADMIN — Medication 1 TABLET: at 09:31

## 2023-02-20 RX ADMIN — GABAPENTIN 800 MG: 400 CAPSULE ORAL at 17:54

## 2023-02-20 RX ADMIN — EMPAGLIFLOZIN 10 MG: 10 TABLET, FILM COATED ORAL at 11:42

## 2023-02-20 RX ADMIN — DULOXETINE HYDROCHLORIDE 60 MG: 60 CAPSULE, DELAYED RELEASE ORAL at 09:31

## 2023-02-20 RX ADMIN — BACLOFEN 30 MG: 10 TABLET ORAL at 11:42

## 2023-02-20 RX ADMIN — FERROUS SULFATE TAB 325 MG (65 MG ELEMENTAL FE) 325 MG: 325 (65 FE) TAB at 09:34

## 2023-02-20 RX ADMIN — INSULIN ASPART 35 UNITS: 100 INJECTION, SOLUTION INTRAVENOUS; SUBCUTANEOUS at 17:53

## 2023-02-20 RX ADMIN — ATORVASTATIN CALCIUM 10 MG: 10 TABLET, FILM COATED ORAL at 22:21

## 2023-02-20 RX ADMIN — BACLOFEN 30 MG: 10 TABLET ORAL at 17:54

## 2023-02-20 RX ADMIN — Medication 10 ML: at 22:22

## 2023-02-20 RX ADMIN — BACLOFEN 30 MG: 10 TABLET ORAL at 22:20

## 2023-02-20 RX ADMIN — ARIPIPRAZOLE 10 MG: 10 TABLET ORAL at 22:20

## 2023-02-20 RX ADMIN — METFORMIN HYDROCHLORIDE 1000 MG: 500 TABLET ORAL at 11:42

## 2023-02-20 RX ADMIN — SUCRALFATE 1 G: 1 TABLET ORAL at 09:31

## 2023-02-20 RX ADMIN — CEFTRIAXONE 2 G: 2 INJECTION, POWDER, FOR SOLUTION INTRAMUSCULAR; INTRAVENOUS at 10:02

## 2023-02-20 RX ADMIN — INSULIN DETEMIR 55 UNITS: 100 INJECTION, SOLUTION SUBCUTANEOUS at 22:19

## 2023-02-20 RX ADMIN — NYSTATIN: 100000 POWDER TOPICAL at 09:33

## 2023-02-20 RX ADMIN — INSULIN ASPART 3 UNITS: 100 INJECTION, SOLUTION INTRAVENOUS; SUBCUTANEOUS at 17:55

## 2023-02-20 RX ADMIN — INSULIN ASPART 10 UNITS: 100 INJECTION, SOLUTION INTRAVENOUS; SUBCUTANEOUS at 09:33

## 2023-02-20 RX ADMIN — SPIRONOLACTONE 25 MG: 25 TABLET ORAL at 09:31

## 2023-02-20 RX ADMIN — APIXABAN 5 MG: 5 TABLET, FILM COATED ORAL at 09:31

## 2023-02-20 RX ADMIN — NYSTATIN: 100000 POWDER TOPICAL at 22:20

## 2023-02-20 RX ADMIN — MODAFINIL 200 MG: 100 TABLET ORAL at 09:31

## 2023-02-20 RX ADMIN — GABAPENTIN 800 MG: 400 CAPSULE ORAL at 09:31

## 2023-02-20 RX ADMIN — PANTOPRAZOLE SODIUM 40 MG: 40 TABLET, DELAYED RELEASE ORAL at 09:32

## 2023-02-20 RX ADMIN — GABAPENTIN 800 MG: 400 CAPSULE ORAL at 22:20

## 2023-02-20 RX ADMIN — Medication 1 CAPSULE: at 09:31

## 2023-02-20 RX ADMIN — VITAMINS A AND D OINTMENT 1 APPLICATION: 15.5; 53.4 OINTMENT TOPICAL at 22:20

## 2023-02-20 RX ADMIN — INSULIN DETEMIR 55 UNITS: 100 INJECTION, SOLUTION SUBCUTANEOUS at 09:30

## 2023-02-20 RX ADMIN — SACUBITRIL AND VALSARTAN 1 TABLET: 24; 26 TABLET, FILM COATED ORAL at 09:31

## 2023-02-20 RX ADMIN — INSULIN ASPART 35 UNITS: 100 INJECTION, SOLUTION INTRAVENOUS; SUBCUTANEOUS at 11:42

## 2023-02-20 RX ADMIN — APIXABAN 5 MG: 5 TABLET, FILM COATED ORAL at 22:20

## 2023-02-20 RX ADMIN — VITAMINS A AND D OINTMENT 1 APPLICATION: 15.5; 53.4 OINTMENT TOPICAL at 09:32

## 2023-02-20 RX ADMIN — BUMETANIDE 2 MG: 1 TABLET ORAL at 09:31

## 2023-02-20 RX ADMIN — INSULIN ASPART 35 UNITS: 100 INJECTION, SOLUTION INTRAVENOUS; SUBCUTANEOUS at 09:30

## 2023-02-20 RX ADMIN — BACLOFEN 30 MG: 10 TABLET ORAL at 09:32

## 2023-02-20 RX ADMIN — INSULIN ASPART 10 UNITS: 100 INJECTION, SOLUTION INTRAVENOUS; SUBCUTANEOUS at 11:43

## 2023-02-20 RX ADMIN — METFORMIN HYDROCHLORIDE 1000 MG: 500 TABLET ORAL at 17:54

## 2023-02-20 RX ADMIN — PANTOPRAZOLE SODIUM 40 MG: 40 TABLET, DELAYED RELEASE ORAL at 22:21

## 2023-02-20 RX ADMIN — VERICIGUAT 2.5 MG: 2.5 TABLET, FILM COATED ORAL at 09:33

## 2023-02-20 NOTE — NURSING NOTE
Pt transferred to 334 for swingbed. Transport took pt by bed with his belongings to room 334. Pt sttated he would inform his family that he was moved to another room. RN on 3 north was given report prior to transfer

## 2023-02-20 NOTE — PAYOR COMM NOTE
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 UNIT/GM powder Unknown Self No No    Apply  topically to the appropriate area as directed Every 12 (Twelve) Hours.        ---------------------------------------------------------------------------------------------------------------------    Objective     Hospital Scheduled Meds:  Acidophilus/Pectin, 1 capsule, Oral, Daily  apixaban, 5 mg, Oral, Q12H  ARIPiprazole, 10 mg, Oral, Nightly  ascorbic acid, 500 mg, Oral, Daily  atorvastatin, 10 mg, Oral, Nightly  baclofen, 30 mg, Oral, 4x Daily With Meals & Nightly  bumetanide, 2 mg, Oral, Daily  carvedilol, 6.25 mg, Oral, BID With Meals  cefepime, 2 g, Intravenous, Q12H  cholecalciferol, 1,000 Units, Oral, Daily  dicyclomine, 10 mg, Oral, BID  DULoxetine, 60 mg, Oral, BID  ferrous sulfate, 325 mg, Oral, Daily With Breakfast  gabapentin, 800 mg, Oral, TID  Insulin Aspart, 0-14 Units, Subcutaneous, TID AC  insulin detemir, 20 Units, Subcutaneous, Nightly  magic barrier cream, 1 application, Topical, BID  magnesium oxide, 1,200 mg, Oral, Daily  modafinil, 200 mg, Oral, Daily  multivitamin with minerals, 1 tablet, Oral, Daily  nystatin, , Topical, Q12H  pantoprazole, 40 mg, Oral, BID  potassium chloride, 40 mEq, Oral, Q4H  sodium chloride, 10 mL, Intravenous, Q12H  spironolactone, 25 mg, Oral, Daily  sucralfate, 1 g, Oral, Daily  vancomycin, 1,250 mg, Intravenous, Q12H  Vericiguat, 2.5 mg, Oral, Daily      Pharmacy Consult - Pharmacy to dose,   Pharmacy Consult - Pharmacy to dose,         Current listed hospital scheduled medications may not yet reflect those currently placed in orders that are signed and held, awaiting patient's arrival to floor/unit.    ---------------------------------------------------------------------------------------------------------------------   Vital Signs:  Temp:  [97.3 °F (36.3 °C)-98.7 °F (37.1 °C)] 98.3 °F (36.8 °C)  Heart Rate:  [] 90  Resp:  [9-20] 18  BP: ()/(58-99) 122/60  Mean  Arterial Pressure (Non-Invasive) for the past 24 hrs (Last 3 readings):   Noninvasive MAP (mmHg)   02/13/23 1416 83   02/13/23 1315 105   02/13/23 1257 104     SpO2 Percentage    02/13/23 1315 02/13/23 1345 02/13/23 1416   SpO2: 96% 96% 97%     SpO2:  [92 %-98 %] 97 %  on  Flow (L/min):  [3-4] 3;   Device (Oxygen Therapy): nasal cannula    Body mass index is 44.28 kg/m².  Wt Readings from Last 3 Encounters:   02/13/23 (!) 144 kg (317 lb 7.4 oz)   12/13/22 (!) 141 kg (310 lb 14.4 oz)   12/02/22 (!) 150 kg (330 lb)       ---------------------------------------------------------------------------------------------------------------------   Physical Exam  Constitutional:       Appearance: Normal appearance.   HENT:      Head: Normocephalic and atraumatic.      Nose: Nose normal.      Mouth/Throat:      Mouth: Mucous membranes are moist.   Eyes:      Pupils: Pupils are equal, round, and reactive to light.   Cardiovascular:      Rate and Rhythm: Normal rate.      Pulses: Normal pulses.   Pulmonary:      Effort: Pulmonary effort is normal.      Breath sounds: Normal breath sounds.   Abdominal:      General: There is distension.   Musculoskeletal:         General: Normal range of motion.      Cervical back: Normal range of motion.      Left Lower Extremity: Left leg is amputated above knee.   Skin:     General: Skin is warm and dry.      Capillary Refill: Capillary refill takes less than 2 seconds.   Neurological:      General: No focal deficit present.      Mental Status: He is alert and oriented to person, place, and time.   Psychiatric:         Mood and Affect: Mood normal.         Behavior: Behavior normal.     Wound dressings in place, clean dry and intact. Surgical dressing.       Assessment & Plan      Stage 4 PI to bilateral gluteal  -Right worse than left  -Hydrogel wet-to-dry dressing.  Magic barrier cream to periarea.    -Will consider wound vac to right gluteal after formal evaluation tomorrow   -Advised to turn  Q2 for offloading. He reports having speciality bed and cushion for wheelchair.    -Magic barrier cream to periarea.  -Management of this condition is inherently complex, requiring ongoing optimization of many factors to assure the highest likelihood of a favorable outcome, including pressure relief, bioburden, multiple aspects of nutrition, infection management, and moisture and mechanical factors relevant to wound healing. Discussed that management of wounds is to prevent infections and maintaince as healing prognosis is poor. This again was discussed at length with the patient and his brother. I discussed options for surgical evaluation for flap, which they report no surgeon will offer. I offered evaluation for hyperbaric therapy, currently refusing at this time.      Right lateal ankle-  Paint area with betadine to base secure with optifoam.     Right heel- paint area with betadine and cover with optifoam    Scrotum- magic barrier     MASD- Ordered magic barrier to be applied PRN. This is currently healed, will order as he often has areas that reopen.      Paraplegia- Family helps to provide assistance for turning. Advised nursing staff to assist when family is not present and to turn Q2 for offloading      Diabetes Mellitus- Recommend tight glycemic control as A1c is 8.90. Patient reports taking medication as prescrbied. Reports glucose levels average 150-200. Advised to follow up with primary care provider to assist with medication adjustments for better glycemic control.      Obesity BMI 46.05- advised on high protein low carb diet, this will help with weight management as well     Recommend to follow-up in outpatient wound clinic once stable for discharge    GUANACO Ellington   [facility]  02/13/23  15:49 EST      Electronically signed by Cathie Handy APRN at 02/13/23 1612     Discharge Planning Assessment  [facility]     Patient Name: Ken Krueger                MRN: [id number]  Today's Date: 2/17/2023                     Admit Date: 2/12/2023     Plan: SS contacted [facility] per Rachelle [phone] who states she has printed the referral and is doing a work up with pt's information. Rachelle states she will return call once all information is reviewed. SS to follow.         Discharge Plan      Row Name 02/17/23 1153           Plan     Plan SS contacted [facility] per Rachelle [phone] who states she has printed the referral and is doing a work up with pt's information. Rachelle states she will return call once all information is reviewed. SS to follow.     1315- SS spoke with pt at bedside. Pt states he is not willing to go to [facility]. Pt states he has done long term IV Abx at home with home health in past. Pt states he would like look at swing bed option or home with home health. SS notified physician.                  Chayito Martinez, PILOW

## 2023-02-20 NOTE — PLAN OF CARE
Problem: Skin Injury Risk Increased  Goal: Skin Health and Integrity  Intervention: Promote and Optimize Oral Intake  Recent Flowsheet Documentation  Taken 2/19/2023 1933 by Nolberto Gonzalez RN  Oral Nutrition Promotion: rest periods promoted     Problem: Adult Inpatient Plan of Care  Goal: Absence of Hospital-Acquired Illness or Injury  Intervention: Prevent Skin Injury  Recent Flowsheet Documentation  Taken 2/19/2023 1933 by Nolberto Gonzalez RN  Body Position: sitting up in bed  Skin Protection:   adhesive use limited   incontinence pads utilized     Problem: Fall Injury Risk  Goal: Absence of Fall and Fall-Related Injury  Intervention: Identify and Manage Contributors  Recent Flowsheet Documentation  Taken 2/19/2023 1933 by Nolberto Gonzalez RN  Medication Review/Management: medications reviewed   Goal Outcome Evaluation:

## 2023-02-20 NOTE — CASE MANAGEMENT/SOCIAL WORK
Discharge Planning Assessment   [facility]     Patient Name: Ken Krueger  MRN: [id number]  Today's Date: 2/20/2023    Admit Date: 2/12/2023    Plan: SS spoke with pt at bedside who is still agreeable for Swing bed option. SS notified physician who placed a swingbed consult on this date. Pt being transferred to [address] [place] [address] while awaiting swing bed pre-autorization. SS to follow.     Discharge Plan     Row Name 02/20/23 1528       Plan    Plan SS spoke with pt at bedside who is still agreeable for Swing bed option. SS notified physician who placed a swingbed consult on this date. Pt being transferred to [address] [place] [address] while awaiting swing bed pre-autorization. SS to follow.              HUMPHREY Loya

## 2023-02-20 NOTE — PLAN OF CARE
Goal Outcome Evaluation:            Pt resting in bed. Wound care done per orders. No s/s acute distress noted. Will monitor

## 2023-02-20 NOTE — PLAN OF CARE
Goal Outcome Evaluation:           Progress: no change  Outcome Evaluation: pt has been resting in bed. transfer from . assessement obtained. orders followed. will continue plan of care

## 2023-02-21 ENCOUNTER — HOSPITAL ENCOUNTER (INPATIENT)
Facility: HOSPITAL | Age: 48
LOS: 42 days | Discharge: HOME HEALTH | DRG: 637 | End: 2023-04-04
Attending: STUDENT IN AN ORGANIZED HEALTH CARE EDUCATION/TRAINING PROGRAM | Admitting: STUDENT IN AN ORGANIZED HEALTH CARE EDUCATION/TRAINING PROGRAM
Payer: MEDICARE

## 2023-02-21 VITALS
RESPIRATION RATE: 20 BRPM | HEIGHT: 71 IN | OXYGEN SATURATION: 93 % | HEART RATE: 91 BPM | SYSTOLIC BLOOD PRESSURE: 133 MMHG | TEMPERATURE: 97.8 F | BODY MASS INDEX: 42.96 KG/M2 | WEIGHT: 306.88 LBS | DIASTOLIC BLOOD PRESSURE: 80 MMHG

## 2023-02-21 DIAGNOSIS — M86.60 OTHER CHRONIC OSTEOMYELITIS, UNSPECIFIED SITE: Primary | ICD-10-CM

## 2023-02-21 LAB
ANION GAP SERPL CALCULATED.3IONS-SCNC: 13.4 MMOL/L (ref 5–15)
BUN SERPL-MCNC: 24 MG/DL (ref 6–20)
BUN/CREAT SERPL: 24.7 (ref 7–25)
CALCIUM SPEC-SCNC: 9.4 MG/DL (ref 8.6–10.5)
CHLORIDE SERPL-SCNC: 100 MMOL/L (ref 98–107)
CO2 SERPL-SCNC: 23.6 MMOL/L (ref 22–29)
CREAT SERPL-MCNC: 0.97 MG/DL (ref 0.76–1.27)
DEPRECATED RDW RBC AUTO: 47.6 FL (ref 37–54)
EGFRCR SERPLBLD CKD-EPI 2021: 98.1 ML/MIN/1.73
ERYTHROCYTE [DISTWIDTH] IN BLOOD BY AUTOMATED COUNT: 14.3 % (ref 12.3–15.4)
GLUCOSE BLDC GLUCOMTR-MCNC: 171 MG/DL (ref 70–130)
GLUCOSE BLDC GLUCOMTR-MCNC: 183 MG/DL (ref 70–130)
GLUCOSE BLDC GLUCOMTR-MCNC: 222 MG/DL (ref 70–130)
GLUCOSE BLDC GLUCOMTR-MCNC: 260 MG/DL (ref 70–130)
GLUCOSE SERPL-MCNC: 257 MG/DL (ref 65–99)
HCT VFR BLD AUTO: 38.4 % (ref 37.5–51)
HGB BLD-MCNC: 11 G/DL (ref 13–17.7)
MCH RBC QN AUTO: 26.4 PG (ref 26.6–33)
MCHC RBC AUTO-ENTMCNC: 28.6 G/DL (ref 31.5–35.7)
MCV RBC AUTO: 92.1 FL (ref 79–97)
MRSA DNA SPEC QL NAA+PROBE: NORMAL
PLATELET # BLD AUTO: 365 10*3/MM3 (ref 140–450)
PMV BLD AUTO: 9.8 FL (ref 6–12)
POTASSIUM SERPL-SCNC: 3.7 MMOL/L (ref 3.5–5.2)
RBC # BLD AUTO: 4.17 10*6/MM3 (ref 4.14–5.8)
SODIUM SERPL-SCNC: 137 MMOL/L (ref 136–145)
WBC NRBC COR # BLD: 12.2 10*3/MM3 (ref 3.4–10.8)

## 2023-02-21 PROCEDURE — 82962 GLUCOSE BLOOD TEST: CPT

## 2023-02-21 PROCEDURE — 99232 SBSQ HOSP IP/OBS MODERATE 35: CPT | Performed by: NURSE PRACTITIONER

## 2023-02-21 PROCEDURE — 85027 COMPLETE CBC AUTOMATED: CPT | Performed by: STUDENT IN AN ORGANIZED HEALTH CARE EDUCATION/TRAINING PROGRAM

## 2023-02-21 PROCEDURE — 63710000001 INSULIN DETEMIR PER 5 UNITS: Performed by: STUDENT IN AN ORGANIZED HEALTH CARE EDUCATION/TRAINING PROGRAM

## 2023-02-21 PROCEDURE — 63710000001 INSULIN ASPART PER 5 UNITS: Performed by: STUDENT IN AN ORGANIZED HEALTH CARE EDUCATION/TRAINING PROGRAM

## 2023-02-21 PROCEDURE — 99239 HOSP IP/OBS DSCHRG MGMT >30: CPT | Performed by: STUDENT IN AN ORGANIZED HEALTH CARE EDUCATION/TRAINING PROGRAM

## 2023-02-21 PROCEDURE — 80048 BASIC METABOLIC PNL TOTAL CA: CPT | Performed by: STUDENT IN AN ORGANIZED HEALTH CARE EDUCATION/TRAINING PROGRAM

## 2023-02-21 PROCEDURE — 99305 1ST NF CARE MODERATE MDM 35: CPT | Performed by: STUDENT IN AN ORGANIZED HEALTH CARE EDUCATION/TRAINING PROGRAM

## 2023-02-21 PROCEDURE — 25010000002 CEFTRIAXONE PER 250 MG: Performed by: STUDENT IN AN ORGANIZED HEALTH CARE EDUCATION/TRAINING PROGRAM

## 2023-02-21 PROCEDURE — 87641 MR-STAPH DNA AMP PROBE: CPT | Performed by: STUDENT IN AN ORGANIZED HEALTH CARE EDUCATION/TRAINING PROGRAM

## 2023-02-21 RX ORDER — SODIUM CHLORIDE 0.9 % (FLUSH) 0.9 %
10 SYRINGE (ML) INJECTION AS NEEDED
Status: DISCONTINUED | OUTPATIENT
Start: 2023-02-21 | End: 2023-04-04 | Stop reason: HOSPADM

## 2023-02-21 RX ORDER — DICYCLOMINE HYDROCHLORIDE 10 MG/1
10 CAPSULE ORAL 2 TIMES DAILY
Status: DISCONTINUED | OUTPATIENT
Start: 2023-02-21 | End: 2023-04-04 | Stop reason: HOSPADM

## 2023-02-21 RX ORDER — SODIUM CHLORIDE 0.9 % (FLUSH) 0.9 %
10 SYRINGE (ML) INJECTION EVERY 12 HOURS SCHEDULED
Status: CANCELLED | OUTPATIENT
Start: 2023-02-21

## 2023-02-21 RX ORDER — SUCRALFATE 1 G/1
1 TABLET ORAL DAILY
Status: DISCONTINUED | OUTPATIENT
Start: 2023-02-22 | End: 2023-04-01

## 2023-02-21 RX ORDER — SPIRONOLACTONE 25 MG/1
25 TABLET ORAL DAILY
Status: CANCELLED | OUTPATIENT
Start: 2023-02-22

## 2023-02-21 RX ORDER — POTASSIUM CHLORIDE 7.45 MG/ML
10 INJECTION INTRAVENOUS
Status: DISCONTINUED | OUTPATIENT
Start: 2023-02-21 | End: 2023-04-04 | Stop reason: HOSPADM

## 2023-02-21 RX ORDER — NYSTATIN 100000 [USP'U]/G
POWDER TOPICAL EVERY 12 HOURS SCHEDULED
Status: DISCONTINUED | OUTPATIENT
Start: 2023-02-21 | End: 2023-04-04 | Stop reason: HOSPADM

## 2023-02-21 RX ORDER — CARVEDILOL 6.25 MG/1
12.5 TABLET ORAL 2 TIMES DAILY WITH MEALS
Status: CANCELLED | OUTPATIENT
Start: 2023-02-21

## 2023-02-21 RX ORDER — BUMETANIDE 1 MG/1
2 TABLET ORAL DAILY
Status: CANCELLED | OUTPATIENT
Start: 2023-02-22

## 2023-02-21 RX ORDER — BACLOFEN 10 MG/1
30 TABLET ORAL
Status: DISCONTINUED | OUTPATIENT
Start: 2023-02-21 | End: 2023-04-04 | Stop reason: HOSPADM

## 2023-02-21 RX ORDER — SODIUM CHLORIDE 9 MG/ML
40 INJECTION, SOLUTION INTRAVENOUS AS NEEDED
Status: CANCELLED | OUTPATIENT
Start: 2023-02-21

## 2023-02-21 RX ORDER — FERROUS SULFATE 325(65) MG
325 TABLET ORAL
Status: CANCELLED | OUTPATIENT
Start: 2023-02-22

## 2023-02-21 RX ORDER — POTASSIUM CHLORIDE 20 MEQ/1
40 TABLET, EXTENDED RELEASE ORAL AS NEEDED
Status: DISCONTINUED | OUTPATIENT
Start: 2023-02-21 | End: 2023-04-04 | Stop reason: HOSPADM

## 2023-02-21 RX ORDER — DULOXETIN HYDROCHLORIDE 60 MG/1
60 CAPSULE, DELAYED RELEASE ORAL 2 TIMES DAILY
Status: DISCONTINUED | OUTPATIENT
Start: 2023-02-21 | End: 2023-04-04 | Stop reason: HOSPADM

## 2023-02-21 RX ORDER — INSULIN ASPART 100 [IU]/ML
35 INJECTION, SOLUTION INTRAVENOUS; SUBCUTANEOUS
Status: CANCELLED | OUTPATIENT
Start: 2023-02-21

## 2023-02-21 RX ORDER — ACETAMINOPHEN 160 MG/5ML
650 SOLUTION ORAL EVERY 4 HOURS PRN
Status: DISCONTINUED | OUTPATIENT
Start: 2023-02-21 | End: 2023-04-04 | Stop reason: HOSPADM

## 2023-02-21 RX ORDER — BUMETANIDE 1 MG/1
2 TABLET ORAL DAILY
Status: DISCONTINUED | OUTPATIENT
Start: 2023-02-22 | End: 2023-03-31

## 2023-02-21 RX ORDER — SODIUM CHLORIDE 0.9 % (FLUSH) 0.9 %
10 SYRINGE (ML) INJECTION AS NEEDED
Status: CANCELLED | OUTPATIENT
Start: 2023-02-21

## 2023-02-21 RX ORDER — SODIUM CHLORIDE 0.9 % (FLUSH) 0.9 %
10 SYRINGE (ML) INJECTION EVERY 12 HOURS SCHEDULED
Status: DISCONTINUED | OUTPATIENT
Start: 2023-02-21 | End: 2023-04-04 | Stop reason: HOSPADM

## 2023-02-21 RX ORDER — DULOXETIN HYDROCHLORIDE 60 MG/1
60 CAPSULE, DELAYED RELEASE ORAL 2 TIMES DAILY
Status: CANCELLED | OUTPATIENT
Start: 2023-02-21

## 2023-02-21 RX ORDER — SUCRALFATE 1 G/1
1 TABLET ORAL DAILY
Status: CANCELLED | OUTPATIENT
Start: 2023-02-22

## 2023-02-21 RX ORDER — NICOTINE POLACRILEX 4 MG
15 LOZENGE BUCCAL
Status: DISCONTINUED | OUTPATIENT
Start: 2023-02-21 | End: 2023-03-14

## 2023-02-21 RX ORDER — DEXTROSE MONOHYDRATE 25 G/50ML
25 INJECTION, SOLUTION INTRAVENOUS
Status: DISCONTINUED | OUTPATIENT
Start: 2023-02-21 | End: 2023-03-14

## 2023-02-21 RX ORDER — NITROGLYCERIN 0.4 MG/1
0.4 TABLET SUBLINGUAL
Status: DISCONTINUED | OUTPATIENT
Start: 2023-02-21 | End: 2023-04-04 | Stop reason: HOSPADM

## 2023-02-21 RX ORDER — GABAPENTIN 400 MG/1
800 CAPSULE ORAL 3 TIMES DAILY
Status: DISCONTINUED | OUTPATIENT
Start: 2023-02-21 | End: 2023-04-04 | Stop reason: HOSPADM

## 2023-02-21 RX ORDER — POTASSIUM CHLORIDE 20 MEQ/1
40 TABLET, EXTENDED RELEASE ORAL AS NEEDED
Status: CANCELLED | OUTPATIENT
Start: 2023-02-21

## 2023-02-21 RX ORDER — ARIPIPRAZOLE 10 MG/1
10 TABLET ORAL NIGHTLY
Status: DISCONTINUED | OUTPATIENT
Start: 2023-02-21 | End: 2023-04-04 | Stop reason: HOSPADM

## 2023-02-21 RX ORDER — POTASSIUM CHLORIDE 7.45 MG/ML
10 INJECTION INTRAVENOUS
Status: CANCELLED | OUTPATIENT
Start: 2023-02-21

## 2023-02-21 RX ORDER — POTASSIUM CHLORIDE 1.5 G/1.77G
40 POWDER, FOR SOLUTION ORAL AS NEEDED
Status: CANCELLED | OUTPATIENT
Start: 2023-02-21

## 2023-02-21 RX ORDER — MODAFINIL 100 MG/1
200 TABLET ORAL DAILY
Status: CANCELLED | OUTPATIENT
Start: 2023-02-22

## 2023-02-21 RX ORDER — BACLOFEN 10 MG/1
30 TABLET ORAL
Status: CANCELLED | OUTPATIENT
Start: 2023-02-21

## 2023-02-21 RX ORDER — FERROUS SULFATE 325(65) MG
325 TABLET ORAL
Status: DISCONTINUED | OUTPATIENT
Start: 2023-02-22 | End: 2023-04-04 | Stop reason: HOSPADM

## 2023-02-21 RX ORDER — INSULIN ASPART 100 [IU]/ML
35 INJECTION, SOLUTION INTRAVENOUS; SUBCUTANEOUS
Status: DISCONTINUED | OUTPATIENT
Start: 2023-02-21 | End: 2023-02-25

## 2023-02-21 RX ORDER — ATORVASTATIN CALCIUM 10 MG/1
10 TABLET, FILM COATED ORAL NIGHTLY
Status: DISCONTINUED | OUTPATIENT
Start: 2023-02-21 | End: 2023-04-04 | Stop reason: HOSPADM

## 2023-02-21 RX ORDER — PANTOPRAZOLE SODIUM 40 MG/1
40 TABLET, DELAYED RELEASE ORAL 2 TIMES DAILY
Status: DISCONTINUED | OUTPATIENT
Start: 2023-02-21 | End: 2023-04-04 | Stop reason: HOSPADM

## 2023-02-21 RX ORDER — DEXTROSE MONOHYDRATE 25 G/50ML
25 INJECTION, SOLUTION INTRAVENOUS
Status: CANCELLED | OUTPATIENT
Start: 2023-02-21

## 2023-02-21 RX ORDER — SPIRONOLACTONE 25 MG/1
25 TABLET ORAL DAILY
Status: DISCONTINUED | OUTPATIENT
Start: 2023-02-22 | End: 2023-04-04 | Stop reason: HOSPADM

## 2023-02-21 RX ORDER — PANTOPRAZOLE SODIUM 40 MG/1
40 TABLET, DELAYED RELEASE ORAL 2 TIMES DAILY
Status: CANCELLED | OUTPATIENT
Start: 2023-02-21

## 2023-02-21 RX ORDER — MULTIPLE VITAMINS W/ MINERALS TAB 9MG-400MCG
1 TAB ORAL DAILY
Status: CANCELLED | OUTPATIENT
Start: 2023-02-22

## 2023-02-21 RX ORDER — GARLIC EXTRACT 500 MG
1 CAPSULE ORAL DAILY
Status: DISCONTINUED | OUTPATIENT
Start: 2023-02-22 | End: 2023-04-04 | Stop reason: HOSPADM

## 2023-02-21 RX ORDER — ASCORBIC ACID 500 MG
500 TABLET ORAL DAILY
Status: DISCONTINUED | OUTPATIENT
Start: 2023-02-22 | End: 2023-04-04 | Stop reason: HOSPADM

## 2023-02-21 RX ORDER — MULTIPLE VITAMINS W/ MINERALS TAB 9MG-400MCG
1 TAB ORAL DAILY
Status: DISCONTINUED | OUTPATIENT
Start: 2023-02-22 | End: 2023-04-04 | Stop reason: HOSPADM

## 2023-02-21 RX ORDER — INSULIN ASPART 100 [IU]/ML
0-14 INJECTION, SOLUTION INTRAVENOUS; SUBCUTANEOUS
Status: CANCELLED | OUTPATIENT
Start: 2023-02-21

## 2023-02-21 RX ORDER — GABAPENTIN 400 MG/1
800 CAPSULE ORAL 3 TIMES DAILY
Status: CANCELLED | OUTPATIENT
Start: 2023-02-21

## 2023-02-21 RX ORDER — NICOTINE POLACRILEX 4 MG
15 LOZENGE BUCCAL
Status: CANCELLED | OUTPATIENT
Start: 2023-02-21

## 2023-02-21 RX ORDER — ATORVASTATIN CALCIUM 10 MG/1
10 TABLET, FILM COATED ORAL NIGHTLY
Status: CANCELLED | OUTPATIENT
Start: 2023-02-21

## 2023-02-21 RX ORDER — INSULIN ASPART 100 [IU]/ML
0-14 INJECTION, SOLUTION INTRAVENOUS; SUBCUTANEOUS
Status: DISCONTINUED | OUTPATIENT
Start: 2023-02-21 | End: 2023-02-26

## 2023-02-21 RX ORDER — OMEGA-3S/DHA/EPA/FISH OIL/D3 300MG-1000
1000 CAPSULE ORAL DAILY
Status: DISCONTINUED | OUTPATIENT
Start: 2023-02-22 | End: 2023-04-04 | Stop reason: HOSPADM

## 2023-02-21 RX ORDER — POTASSIUM CHLORIDE 1.5 G/1.77G
40 POWDER, FOR SOLUTION ORAL AS NEEDED
Status: DISCONTINUED | OUTPATIENT
Start: 2023-02-21 | End: 2023-04-04 | Stop reason: HOSPADM

## 2023-02-21 RX ORDER — OMEGA-3S/DHA/EPA/FISH OIL/D3 300MG-1000
1000 CAPSULE ORAL DAILY
Status: CANCELLED | OUTPATIENT
Start: 2023-02-22

## 2023-02-21 RX ORDER — ACETAMINOPHEN 650 MG/1
650 SUPPOSITORY RECTAL EVERY 4 HOURS PRN
Status: CANCELLED | OUTPATIENT
Start: 2023-02-21

## 2023-02-21 RX ORDER — ARIPIPRAZOLE 10 MG/1
10 TABLET ORAL NIGHTLY
Status: CANCELLED | OUTPATIENT
Start: 2023-02-21

## 2023-02-21 RX ORDER — BUMETANIDE 2 MG/1
2 TABLET ORAL 2 TIMES DAILY
Status: ON HOLD | COMMUNITY
End: 2023-04-12 | Stop reason: SDUPTHER

## 2023-02-21 RX ORDER — SODIUM CHLORIDE 9 MG/ML
40 INJECTION, SOLUTION INTRAVENOUS AS NEEDED
Status: DISCONTINUED | OUTPATIENT
Start: 2023-02-21 | End: 2023-04-04 | Stop reason: HOSPADM

## 2023-02-21 RX ORDER — DICYCLOMINE HYDROCHLORIDE 10 MG/1
10 CAPSULE ORAL 2 TIMES DAILY
Status: CANCELLED | OUTPATIENT
Start: 2023-02-21

## 2023-02-21 RX ORDER — CARVEDILOL 6.25 MG/1
12.5 TABLET ORAL 2 TIMES DAILY WITH MEALS
Status: DISCONTINUED | OUTPATIENT
Start: 2023-02-21 | End: 2023-04-04 | Stop reason: HOSPADM

## 2023-02-21 RX ORDER — MODAFINIL 100 MG/1
200 TABLET ORAL DAILY
Status: DISCONTINUED | OUTPATIENT
Start: 2023-02-22 | End: 2023-04-04 | Stop reason: HOSPADM

## 2023-02-21 RX ORDER — GARLIC EXTRACT 500 MG
1 CAPSULE ORAL DAILY
Status: CANCELLED | OUTPATIENT
Start: 2023-02-22

## 2023-02-21 RX ORDER — ACETAMINOPHEN 160 MG/5ML
650 SOLUTION ORAL EVERY 4 HOURS PRN
Status: CANCELLED | OUTPATIENT
Start: 2023-02-21

## 2023-02-21 RX ORDER — ASCORBIC ACID 500 MG
500 TABLET ORAL DAILY
Status: CANCELLED | OUTPATIENT
Start: 2023-02-22

## 2023-02-21 RX ORDER — ACETAMINOPHEN 650 MG/1
650 SUPPOSITORY RECTAL EVERY 4 HOURS PRN
Status: DISCONTINUED | OUTPATIENT
Start: 2023-02-21 | End: 2023-04-04 | Stop reason: HOSPADM

## 2023-02-21 RX ORDER — NYSTATIN 100000 [USP'U]/G
POWDER TOPICAL EVERY 12 HOURS SCHEDULED
Status: CANCELLED | OUTPATIENT
Start: 2023-02-21

## 2023-02-21 RX ORDER — ACETAMINOPHEN 325 MG/1
650 TABLET ORAL EVERY 4 HOURS PRN
Status: DISCONTINUED | OUTPATIENT
Start: 2023-02-21 | End: 2023-04-04 | Stop reason: HOSPADM

## 2023-02-21 RX ORDER — ACETAMINOPHEN 325 MG/1
650 TABLET ORAL EVERY 4 HOURS PRN
Status: CANCELLED | OUTPATIENT
Start: 2023-02-21

## 2023-02-21 RX ORDER — NITROGLYCERIN 0.4 MG/1
0.4 TABLET SUBLINGUAL
Status: CANCELLED | OUTPATIENT
Start: 2023-02-21

## 2023-02-21 RX ADMIN — CARVEDILOL 12.5 MG: 6.25 TABLET, FILM COATED ORAL at 17:39

## 2023-02-21 RX ADMIN — SUCRALFATE 1 G: 1 TABLET ORAL at 08:39

## 2023-02-21 RX ADMIN — DULOXETINE HYDROCHLORIDE 60 MG: 60 CAPSULE, DELAYED RELEASE ORAL at 08:38

## 2023-02-21 RX ADMIN — PANTOPRAZOLE SODIUM 40 MG: 40 TABLET, DELAYED RELEASE ORAL at 08:38

## 2023-02-21 RX ADMIN — INSULIN ASPART 5 UNITS: 100 INJECTION, SOLUTION INTRAVENOUS; SUBCUTANEOUS at 12:02

## 2023-02-21 RX ADMIN — GABAPENTIN 800 MG: 400 CAPSULE ORAL at 08:38

## 2023-02-21 RX ADMIN — INSULIN ASPART 8 UNITS: 100 INJECTION, SOLUTION INTRAVENOUS; SUBCUTANEOUS at 08:35

## 2023-02-21 RX ADMIN — Medication 1000 UNITS: at 08:38

## 2023-02-21 RX ADMIN — SACUBITRIL AND VALSARTAN 1 TABLET: 24; 26 TABLET, FILM COATED ORAL at 08:38

## 2023-02-21 RX ADMIN — Medication 1 CAPSULE: at 08:39

## 2023-02-21 RX ADMIN — DICYCLOMINE HYDROCHLORIDE 10 MG: 10 CAPSULE ORAL at 08:38

## 2023-02-21 RX ADMIN — METFORMIN HYDROCHLORIDE 1000 MG: 500 TABLET ORAL at 08:38

## 2023-02-21 RX ADMIN — DULOXETINE HYDROCHLORIDE 60 MG: 60 CAPSULE, DELAYED RELEASE ORAL at 21:29

## 2023-02-21 RX ADMIN — BACLOFEN 30 MG: 10 TABLET ORAL at 08:38

## 2023-02-21 RX ADMIN — MAGNESIUM GLUCONATE 500 MG ORAL TABLET 1200 MG: 500 TABLET ORAL at 08:39

## 2023-02-21 RX ADMIN — BACLOFEN 30 MG: 10 TABLET ORAL at 21:29

## 2023-02-21 RX ADMIN — APIXABAN 5 MG: 5 TABLET, FILM COATED ORAL at 08:38

## 2023-02-21 RX ADMIN — DICYCLOMINE HYDROCHLORIDE 10 MG: 10 CAPSULE ORAL at 21:29

## 2023-02-21 RX ADMIN — NYSTATIN: 100000 POWDER TOPICAL at 08:40

## 2023-02-21 RX ADMIN — MODAFINIL 200 MG: 100 TABLET ORAL at 08:38

## 2023-02-21 RX ADMIN — VITAMINS A AND D OINTMENT 1 APPLICATION: 15.5; 53.4 OINTMENT TOPICAL at 08:40

## 2023-02-21 RX ADMIN — INSULIN ASPART 35 UNITS: 100 INJECTION, SOLUTION INTRAVENOUS; SUBCUTANEOUS at 17:40

## 2023-02-21 RX ADMIN — SACUBITRIL AND VALSARTAN 1 TABLET: 24; 26 TABLET, FILM COATED ORAL at 21:29

## 2023-02-21 RX ADMIN — OXYCODONE HYDROCHLORIDE AND ACETAMINOPHEN 500 MG: 500 TABLET ORAL at 08:39

## 2023-02-21 RX ADMIN — PANTOPRAZOLE SODIUM 40 MG: 40 TABLET, DELAYED RELEASE ORAL at 21:29

## 2023-02-21 RX ADMIN — BUMETANIDE 2 MG: 1 TABLET ORAL at 08:38

## 2023-02-21 RX ADMIN — BACLOFEN 30 MG: 10 TABLET ORAL at 17:39

## 2023-02-21 RX ADMIN — NYSTATIN: 100000 POWDER TOPICAL at 21:29

## 2023-02-21 RX ADMIN — INSULIN DETEMIR 55 UNITS: 100 INJECTION, SOLUTION SUBCUTANEOUS at 08:36

## 2023-02-21 RX ADMIN — ATORVASTATIN CALCIUM 10 MG: 10 TABLET, FILM COATED ORAL at 21:29

## 2023-02-21 RX ADMIN — Medication 10 ML: at 08:40

## 2023-02-21 RX ADMIN — CEFTRIAXONE 2 G: 2 INJECTION, POWDER, FOR SOLUTION INTRAMUSCULAR; INTRAVENOUS at 08:38

## 2023-02-21 RX ADMIN — CARVEDILOL 12.5 MG: 6.25 TABLET, FILM COATED ORAL at 08:39

## 2023-02-21 RX ADMIN — INSULIN ASPART 35 UNITS: 100 INJECTION, SOLUTION INTRAVENOUS; SUBCUTANEOUS at 08:35

## 2023-02-21 RX ADMIN — ARIPIPRAZOLE 10 MG: 10 TABLET ORAL at 21:29

## 2023-02-21 RX ADMIN — GABAPENTIN 800 MG: 400 CAPSULE ORAL at 21:29

## 2023-02-21 RX ADMIN — EMPAGLIFLOZIN 10 MG: 10 TABLET, FILM COATED ORAL at 08:42

## 2023-02-21 RX ADMIN — SPIRONOLACTONE 25 MG: 25 TABLET ORAL at 08:38

## 2023-02-21 RX ADMIN — VITAMINS A AND D OINTMENT 1 APPLICATION: 15.5; 53.4 OINTMENT TOPICAL at 21:29

## 2023-02-21 RX ADMIN — METFORMIN HYDROCHLORIDE 1000 MG: 500 TABLET ORAL at 17:39

## 2023-02-21 RX ADMIN — APIXABAN 5 MG: 5 TABLET, FILM COATED ORAL at 21:29

## 2023-02-21 RX ADMIN — BACLOFEN 30 MG: 10 TABLET ORAL at 12:00

## 2023-02-21 RX ADMIN — Medication 10 ML: at 21:30

## 2023-02-21 RX ADMIN — INSULIN DETEMIR 55 UNITS: 100 INJECTION, SOLUTION SUBCUTANEOUS at 21:28

## 2023-02-21 RX ADMIN — INSULIN ASPART 3 UNITS: 100 INJECTION, SOLUTION INTRAVENOUS; SUBCUTANEOUS at 17:40

## 2023-02-21 RX ADMIN — GABAPENTIN 800 MG: 400 CAPSULE ORAL at 16:45

## 2023-02-21 RX ADMIN — Medication 1 TABLET: at 08:38

## 2023-02-21 RX ADMIN — FERROUS SULFATE TAB 325 MG (65 MG ELEMENTAL FE) 325 MG: 325 (65 FE) TAB at 08:39

## 2023-02-21 RX ADMIN — INSULIN ASPART 35 UNITS: 100 INJECTION, SOLUTION INTRAVENOUS; SUBCUTANEOUS at 12:00

## 2023-02-21 RX ADMIN — TUBERCULIN PURIFIED PROTEIN DERIVATIVE 5 UNITS: 5 INJECTION, SOLUTION INTRADERMAL at 11:59

## 2023-02-21 NOTE — CASE MANAGEMENT/SOCIAL WORK
Discharge Planning Assessment   Fabricio     Patient Name: Ken Krueger  MRN: [id number]  Today's Date: 2/21/2023    Admit Date: 2/12/2023    Plan: SS spoke with pt at bedside who is still agreeable for Swing bed option. SS notified physician who placed a swingbed consult on this date. Pt being transferred to [address] [place] [address] while awaiting swing bed pre-autorization. SS to follow.     Discharge Plan     Row Name 02/21/23 1543       Plan    Final Discharge Disposition Code 61 - hospital-based swing bed    Final Note Pt has been approved for swing bed admission on this date. Pt is being discharged to swing bed on this date. No other needs identified at this time.                    PILO LoyaW

## 2023-02-21 NOTE — PROGRESS NOTES
PROGRESS NOTE         Patient Identification:  Name:  Ken Krueger  Age:  47 y.o.  Sex:  male  :  1975  MRN:  [id number]  Visit Number:  [id number]  Primary Care Provider:  Franchesca Hodges APRN         LOS: 9 days       ----------------------------------------------------------------------------------------------------------------------  Subjective       Chief Complaints:    Wound Check        Interval History:      Patient sitting up in bed this morning.  No issues or complaints.  Primary RN and family at bedside.  Currently on room air with no apparent distress.  Lungs clear to auscultation bilaterally.  Abdomen distended, nontender.  Reports no increase in ostomy output.  WBC slightly elevated 12.20.    Review of Systems:    Constitutional: no fever, chills and night sweats.  No fatigue.  Eyes: no eye drainage, itching or redness.  HEENT: no mouth sores, dysphagia or nose bleed.  Respiratory: no for shortness of breath, cough or production of sputum.  Cardiovascular: no chest pain, no palpitations, no orthopnea.  Gastrointestinal: no nausea, vomiting or diarrhea. No abdominal pain, hematemesis or rectal bleeding.  Genitourinary: no dysuria or polyuria.  Hematologic/lymphatic: no lymph node abnormalities, no easy bruising or easy bleeding.  Musculoskeletal: no muscle or joint pain.  Skin: No rash and no itching.  Decubitus ulcers.  Neurological: no loss of consciousness, no seizure, no headache.  Behavioral/Psych: no depression or suicidal ideation.  Endocrine: no hot flashes.  Immunologic: negative.    ----------------------------------------------------------------------------------------------------------------------      Objective       Current Hospital Meds:    No current facility-administered medications for this encounter.    ----------------------------------------------------------------------------------------------------------------------    Vital Signs:  Temp:  [97.5 °F (36.4  °C)-98.3 °F (36.8 °C)] 97.8 °F (36.6 °C)  Heart Rate:  [] 91  Resp:  [20] 20  BP: (102-133)/(65-80) 133/80  Mean Arterial Pressure (Non-Invasive) for the past 24 hrs (Last 3 readings):   Noninvasive MAP (mmHg)   02/21/23 0600 97   02/20/23 1408 85   02/20/23 1044 87     SpO2 Percentage    02/20/23 2255 02/21/23 0300 02/21/23 0600   SpO2: 96% 93% 93%     SpO2:  [93 %-97 %] 93 %  on   ;   Device (Oxygen Therapy): room air    Body mass index is 42.8 kg/m².  Wt Readings from Last 3 Encounters:   02/20/23 (!) 139 kg (306 lb 14.1 oz)   12/13/22 (!) 141 kg (310 lb 14.4 oz)   12/02/22 (!) 150 kg (330 lb)        Intake/Output Summary (Last 24 hours) at 2/21/2023 1036  Last data filed at 2/21/2023 0300  Gross per 24 hour   Intake 840 ml   Output 4200 ml   Net -3360 ml     No diet orders on file  ----------------------------------------------------------------------------------------------------------------------      Physical Exam:    Constitutional: Very pleasant obese male is sitting up in bed on room air in no apparent distress.  Appears comfortable.  RN and family at bedside.  HENT:  Head: Normocephalic and atraumatic.  Mouth:  Moist mucous membranes.    Eyes:  Conjunctivae and EOM are normal.  No scleral icterus.  Neck:  Neck supple.  No JVD present.    Cardiovascular:  Normal rate, regular rhythm and normal heart sounds with no murmur. No edema.  Pulmonary/Chest:  No respiratory distress, no wheezes, no crackles, with normal breath sounds and good air movement.  Lungs clear to auscultation bilaterally  Abdominal: Obese.  Soft.  Bowel sounds are normal.  No distension and no tenderness.  Ileal conduit and colostomy bag in place.  Musculoskeletal:  No tenderness.  No swelling or redness of joints.  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  1+ edema of right lower extremity.  PICC to right upper arm with no signs of infection.  Neurological:  Alert and oriented to person, place, and time.  No facial droop.  No slurred speech.   Skin:   Stage IV sacral decubitus wound status post excisional debridement.  Psychiatric:  Normal mood and affect.  Behavior is normal.      ----------------------------------------------------------------------------------------------------------------------            Results from last 7 days   Lab Units 02/21/23  0150 02/17/23  0240 02/16/23  0056   WBC 10*3/mm3 12.20* 8.30 8.28   HEMOGLOBIN g/dL 11.0* 10.7* 10.6*   HEMATOCRIT % 38.4 36.3* 36.1*   MCV fL 92.1 91.0 90.9   MCHC g/dL 28.6* 29.5* 29.4*   PLATELETS 10*3/mm3 365 206 337     Results from last 7 days   Lab Units 02/21/23  0150 02/20/23  0957 02/17/23  0240   SODIUM mmol/L 137 134* 131*   POTASSIUM mmol/L 3.7 4.0 3.6   CHLORIDE mmol/L 100 98 96*   CO2 mmol/L 23.6 26.5 25.6   BUN mg/dL 24* 24* 21*   CREATININE mg/dL 0.97 0.80 1.08   CALCIUM mg/dL 9.4 9.3 9.0   GLUCOSE mg/dL 257* 401* 382*   Estimated Creatinine Clearance: 137.4 mL/min (by C-G formula based on SCr of 0.97 mg/dL).  No results found for: AMMONIA    Glucose   Date/Time Value Ref Range Status   02/21/2023 0725 260 (H) 70 - 130 mg/dL Final     Comment:     Meter: [id number] : [id number] ROSA MARIA FLORES   02/20/2023 2218 204 (H) 70 - 130 mg/dL Final     Comment:     Meter: [id number] : [id number] SIMONE PETTY   02/20/2023 1705 190 (H) 70 - 130 mg/dL Final     Comment:     Meter: [id number] : [id number] DILLAN LYNN   02/20/2023 1046 339 (H) 70 - 130 mg/dL Final     Comment:     Meter: [id number] : [id number] BALJINDER JUAREZ   02/20/2023 0711 312 (H) 70 - 130 mg/dL Final     Comment:     Meter: [id number] : [id number] LEXIE OSULLIVAN   02/19/2023 1951 315 (H) 70 - 130 mg/dL Final     Comment:     RN Notified Meter: [id number] : [id number] DAGMAR KEMP   02/19/2023 1621 290 (H) 70 - 130 mg/dL Final     Comment:     Meter: [id number] : [id number] ROSA MARIA FLORES   02/19/2023 1108 397 (H) 70 - 130 mg/dL Final     Comment:     Meter: [id number] : [id number] ROSA MARIA [place]      Lab Results   Component Value Date    HGBA1C 11.50 (H) 02/13/2023     Lab Results   Component Value Date    TSH 3.780 07/19/2022    FREET4 1.31 06/02/2021       Blood Culture   Date Value Ref Range Status   02/12/2023 No growth at 24 hours  Preliminary   02/12/2023 No growth at 24 hours  Preliminary     No results found for: URINECX  No results found for: WOUNDCX  No results found for: STOOLCX  No results found for: RESPCX  Pain Management Panel     Pain Management Panel Latest Ref Rng & Units 6/2/2021 8/19/2018    AMPHETAMINES SCREEN, URINE Negative Negative Negative    BARBITURATES SCREEN Negative Negative Negative    BENZODIAZEPINE SCREEN, URINE Negative Negative Negative    BUPRENORPHINEUR Negative Negative Negative    COCAINE SCREEN, URINE Negative Negative Negative    METHADONE SCREEN, URINE Negative Negative Negative            ----------------------------------------------------------------------------------------------------------------------  Imaging Results (Last 24 Hours)     ** No results found for the last 24 hours. **          -----------------------------------------------------------------------------------  Pertinent Infectious Disease Results     CT abdomen pelvis with contrast on 2/12/2023 showed there is a new decubitus ulcer just to the right of midline at the level of the coccyx with constellation of imaging findings most characteristic of acute infection/osteomyelitis. Small pocket of air and fluid adjacent to the coccyx measuring 3.7 x 4.1 cm could reflect phlegmon or abscess. Chronic bilateral decubitus ulcers at the level of the ischial tuberosities bilaterally with imaging findings suggestive of chronic infection/osteomyelitis, similar to prior. Hepatic steatosis and hepatomegaly with borderline splenomegaly. Nonobstructing left renal stones. Postoperative changes of left lower quadrant and colostomy and right mid abdominal ileal conduit formation. Diverticulosis. No diverticulitis or  bowel obstruction. COVID-19 and flu A/B PCR on 2/12/2023 was negative.    Status post excisional debridement of sacral ulcer on 2/13/2023 with Dr. Taylor.         Assessment/Plan         ASSESSMENT:    Septic shock with lactic acid greater than 4 on admission  Acute and chronic decubitus ulcerations with osteomyelitis and possible abscess      PLAN:      Patient sitting up in bed this morning.  No issues or complaints.  Primary RN and family at bedside.  Currently on room air with no apparent distress.  Lungs clear to auscultation bilaterally.  Abdomen distended, nontender.  Reports no increase in ostomy output.  WBC slightly elevated 12.20.    Based on the finalization of bone and tissue culture with E. coli, it is recommended that the patient continue on ceftriaxone through 3/29/2023 for the treatment of osteomyelitis.    Code Status:   Code Status and Medical Interventions:   Ordered at: 02/12/23 2326     Code Status (Patient has no pulse and is not breathing):    CPR (Attempt to Resuscitate)     Medical Interventions (Patient has pulse or is breathing):    Full Support       GUANACO Flores  02/21/23  10:36 EST
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
## 2023-02-21 NOTE — PROGRESS NOTES
[facility] HOSPITALIST PROGRESS NOTE     Patient Identification:  Name:  Ken Krueger  Age:  47 y.o.  Sex:  male  :  1975  MRN:  [id number]  Visit Number:  [id number]  ROOM: [address] [place] [address]     Primary Care Provider:  Franchesca Hodges APRN    Length of stay in inpatient status:  8    Subjective     Chief Compliant:    Chief Complaint   Patient presents with   • Wound Check       History of Presenting Illness: Patient seen and evaluated in follow-up for sepsis secondary to acute osteomyelitis of the sacrum secondary to E. coli in the setting of paraplegia posttraumatic T7 injury.  Patient without any acute complaints at time of exam today.  Patient continues with poorly controlled hyperglycemia and diabetic regimen adjusted .    Objective     Current Hospital Meds:  Acidophilus/Pectin, 1 capsule, Oral, Daily  apixaban, 5 mg, Oral, Q12H  ARIPiprazole, 10 mg, Oral, Nightly  ascorbic acid, 500 mg, Oral, Daily  atorvastatin, 10 mg, Oral, Nightly  baclofen, 30 mg, Oral, 4x Daily With Meals & Nightly  bumetanide, 2 mg, Oral, Daily  carvedilol, 12.5 mg, Oral, BID With Meals  cefTRIAXone, 2 g, Intravenous, Q24H  cholecalciferol, 1,000 Units, Oral, Daily  dicyclomine, 10 mg, Oral, BID  DULoxetine, 60 mg, Oral, BID  empagliflozin, 10 mg, Oral, Daily  ferrous sulfate, 325 mg, Oral, Daily With Breakfast  gabapentin, 800 mg, Oral, TID  Insulin Aspart, 0-14 Units, Subcutaneous, TID AC  Insulin Aspart, 35 Units, Subcutaneous, TID With Meals  insulin detemir, 55 Units, Subcutaneous, Q12H  magic barrier cream, 1 application, Topical, BID  magnesium oxide, 1,200 mg, Oral, Daily  metFORMIN, 1,000 mg, Oral, BID With Meals  modafinil, 200 mg, Oral, Daily  multivitamin with minerals, 1 tablet, Oral, Daily  nystatin, , Topical, Q12H  pantoprazole, 40 mg, Oral, BID  sacubitril-valsartan, 1 tablet, Oral, Q12H  silver nitrate, 1 application, Topical, Once  sodium chloride, 10 mL, Intravenous, Q12H  spironolactone, 25 mg, Oral,  Daily  sucralfate, 1 g, Oral, Daily  Vericiguat, 2.5 mg, Oral, Daily      Pharmacy Consult - Pharmacy to dose,       ----------------------------------------------------------------------------------------------------------------------  Vital Signs:  Temp:  [97.1 °F (36.2 °C)-98.9 °F (37.2 °C)] 97.8 °F (36.6 °C)  Heart Rate:  [84-96] 87  Resp:  [18-20] 20  BP: (104-132)/(60-71) 132/71  SpO2:  [95 %-98 %] 96 %  on   ;   Device (Oxygen Therapy): room air  Body mass index is 42.8 kg/m².      Intake/Output Summary (Last 24 hours) at 2/20/2023 1940  Last data filed at 2/20/2023 1500  Gross per 24 hour   Intake 720 ml   Output 2750 ml   Net -2030 ml      ----------------------------------------------------------------------------------------------------------------------  Physical exam:  Constitutional: [place] obese adult male resting in bed, NAD  HENT:  Head:  Normocephalic and atraumatic.  Mouth:  Moist mucous membranes.    Eyes:  Conjunctivae and EOM are normal. No scleral icterus.    Cardiovascular:  Normal rate, regular rhythm and normal heart sounds with no murmur.  Pulmonary/Chest:  No respiratory distress, no wheezes, no crackles, with normal breath sounds and good air movement.  Abdominal:  Soft.  Bowel sounds are normal.  No distension and no tenderness.   Musculoskeletal:  No tenderness, and no deformity.  No red or swollen joints anywhere.  Left AKA present  Neurological:  Alert and oriented to person, place, and time.  No cranial nerve deficit.  No tongue deviation.  No facial droop.  No slurred speech. Intact Sensation throughout  Skin:  Skin is warm and dry. No rash or lesion noted. No pallor.   Peripheral vascular:  Pulses in all 4 extremities with no clubbing, no cyanosis, no edema.  Psychiatric: Appropriate mood and affect, pleasant.   ----------------------------------------------------------------------------------------------------------------------     BUN/CREAT/GLUC/ALT/AST/FINN/LIP     24/0.80/401/--/--/--/-- (02/20 0957)  LYTES - Na/K/Cl/CO2: 134*/4.0/98/26.5 (02/20 0957)        No results found for: URINECX  No results found for: BLOODCX    I have personally looked at the labs and they are summarized above.  ----------------------------------------------------------------------------------------------------------------------  Detailed radiology reports for the last 24 hours:  No radiology results for the last day  Assessment & Plan      Septic shock per CMS guidelines  E. coli sacral osteomyelitis  Paraplegia s/p posttraumatic T7 injury  Status post ilial conduit and colostomy    -Patient initially with lactic acid greater than 4 admission in the setting of sepsis consistent with CMS guidelines definition of septic shock.    -Infectious disease consulted and following along    -Patient with history of decubitus ulcerations of the sacrum however acutely worsened and now with evidence of osteomyelitis on CT imaging with new decubitus ulcer to the right of midline with constellation of imaging findings characteristic of acute osteomyelitis with small pocket of air and fluid adjacent to the coccyx measuring 3.7 x 4.1 cm representative possible phlegmon versus abscess    -Status post debridement on 2/13/2023 with removal of necrotic tissue and noted tracking of the wound cephalad towards the midline of the right proximal sacral decubitus that connects with a small upper gluteal cleft midline wound with necrotic fat and muscle within.    -Continue Rocephin through 3/29/2023 for treatment of osteomyelitis per ID recommendation    Diabetes mellitus type 2    -Patient had very poorly controlled diabetic with a hemoglobin A1c of 12    -Increased basal and bolus insulin as well as added back patient's home metformin and added Jardiance as well.    Chronic anticoagulation 2/2 history of PE    -Continue apixaban    Morbid obesity    -Complicates all aspects of care    Obstructive sleep apnea    -Continue  BiPAP at night    History of ischemic cardiomyopathy    -Continue statin, Eliquis, Entresto, Aldactone, Coreg.    -TTE from  2/8/2023 reviewed and showed EF of 21 to 25% with moderately dilated left ventricular cavity with moderate concentric hypertrophy and grade 3 fixed restrictive pattern.    Copied text in portions of the note has been reviewed and is accurate as of 02/20/23    VTE Prophylaxis:   Mechanical Order History:     None      Pharmalogical Order History:      Ordered     Dose Route Frequency Stop    02/13/23 1306  apixaban (ELIQUIS) tablet 5 mg         5 mg PO Every 12 Hours Scheduled --                Disposition swing bed versus home with home health and antibiotics.    Robinson Yanes DO  [facility] Hospitalist  02/20/23  19:40 EST
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
## 2023-02-21 NOTE — PHARMACY PATIENT ASSISTANCE
Pharmacy checked on coverage/cost of jardiance.  According to his insurance, he will have a $0 copay.    Thank You;  Laisha Cabrera, PharmD  02/21/23  16:07 EST

## 2023-02-21 NOTE — PLAN OF CARE
Goal Outcome Evaluation:  Plan of Care Reviewed With: patient        Progress: no change  Outcome Evaluation: Pt has rested in bed overnight. VSS. Dressings remain CDI. No acute changes overnight.

## 2023-02-21 NOTE — PLAN OF CARE
Goal Outcome Evaluation:               Patient is resting in bed, family at bedside. Patient admitted to swingbed this shift. No acute changes or complaints, will continue POC

## 2023-02-22 LAB
GLUCOSE BLDC GLUCOMTR-MCNC: 184 MG/DL (ref 70–130)
GLUCOSE BLDC GLUCOMTR-MCNC: 218 MG/DL (ref 70–130)
GLUCOSE BLDC GLUCOMTR-MCNC: 273 MG/DL (ref 70–130)
GLUCOSE BLDC GLUCOMTR-MCNC: 312 MG/DL (ref 70–130)

## 2023-02-22 PROCEDURE — 82962 GLUCOSE BLOOD TEST: CPT

## 2023-02-22 PROCEDURE — 99309 SBSQ NF CARE MODERATE MDM 30: CPT | Performed by: INTERNAL MEDICINE

## 2023-02-22 PROCEDURE — 63710000001 INSULIN DETEMIR PER 5 UNITS: Performed by: STUDENT IN AN ORGANIZED HEALTH CARE EDUCATION/TRAINING PROGRAM

## 2023-02-22 PROCEDURE — 63710000001 INSULIN ASPART PER 5 UNITS: Performed by: STUDENT IN AN ORGANIZED HEALTH CARE EDUCATION/TRAINING PROGRAM

## 2023-02-22 PROCEDURE — 97162 PT EVAL MOD COMPLEX 30 MIN: CPT

## 2023-02-22 PROCEDURE — 25010000002 CEFTRIAXONE PER 250 MG: Performed by: STUDENT IN AN ORGANIZED HEALTH CARE EDUCATION/TRAINING PROGRAM

## 2023-02-22 PROCEDURE — 97166 OT EVAL MOD COMPLEX 45 MIN: CPT

## 2023-02-22 RX ADMIN — GABAPENTIN 800 MG: 400 CAPSULE ORAL at 21:29

## 2023-02-22 RX ADMIN — SPIRONOLACTONE 25 MG: 25 TABLET ORAL at 08:49

## 2023-02-22 RX ADMIN — CARVEDILOL 12.5 MG: 6.25 TABLET, FILM COATED ORAL at 08:48

## 2023-02-22 RX ADMIN — INSULIN ASPART 35 UNITS: 100 INJECTION, SOLUTION INTRAVENOUS; SUBCUTANEOUS at 17:20

## 2023-02-22 RX ADMIN — EMPAGLIFLOZIN 10 MG: 10 TABLET, FILM COATED ORAL at 08:49

## 2023-02-22 RX ADMIN — INSULIN ASPART 35 UNITS: 100 INJECTION, SOLUTION INTRAVENOUS; SUBCUTANEOUS at 11:47

## 2023-02-22 RX ADMIN — VITAMINS A AND D OINTMENT 1 APPLICATION: 15.5; 53.4 OINTMENT TOPICAL at 21:29

## 2023-02-22 RX ADMIN — Medication 10 ML: at 21:30

## 2023-02-22 RX ADMIN — Medication 1 TABLET: at 08:49

## 2023-02-22 RX ADMIN — VITAMINS A AND D OINTMENT 1 APPLICATION: 15.5; 53.4 OINTMENT TOPICAL at 08:56

## 2023-02-22 RX ADMIN — Medication 1000 UNITS: at 08:48

## 2023-02-22 RX ADMIN — BACLOFEN 30 MG: 10 TABLET ORAL at 21:29

## 2023-02-22 RX ADMIN — NYSTATIN: 100000 POWDER TOPICAL at 21:29

## 2023-02-22 RX ADMIN — PANTOPRAZOLE SODIUM 40 MG: 40 TABLET, DELAYED RELEASE ORAL at 08:49

## 2023-02-22 RX ADMIN — BACLOFEN 30 MG: 10 TABLET ORAL at 08:48

## 2023-02-22 RX ADMIN — SACUBITRIL AND VALSARTAN 1 TABLET: 24; 26 TABLET, FILM COATED ORAL at 08:48

## 2023-02-22 RX ADMIN — DICYCLOMINE HYDROCHLORIDE 10 MG: 10 CAPSULE ORAL at 21:29

## 2023-02-22 RX ADMIN — DICYCLOMINE HYDROCHLORIDE 10 MG: 10 CAPSULE ORAL at 08:49

## 2023-02-22 RX ADMIN — Medication 1 CAPSULE: at 08:49

## 2023-02-22 RX ADMIN — ARIPIPRAZOLE 10 MG: 10 TABLET ORAL at 21:28

## 2023-02-22 RX ADMIN — METFORMIN HYDROCHLORIDE 1000 MG: 500 TABLET ORAL at 08:48

## 2023-02-22 RX ADMIN — GABAPENTIN 800 MG: 400 CAPSULE ORAL at 08:50

## 2023-02-22 RX ADMIN — BUMETANIDE 2 MG: 1 TABLET ORAL at 08:48

## 2023-02-22 RX ADMIN — NYSTATIN: 100000 POWDER TOPICAL at 08:57

## 2023-02-22 RX ADMIN — INSULIN ASPART 3 UNITS: 100 INJECTION, SOLUTION INTRAVENOUS; SUBCUTANEOUS at 17:20

## 2023-02-22 RX ADMIN — MAGNESIUM GLUCONATE 500 MG ORAL TABLET 1200 MG: 500 TABLET ORAL at 08:48

## 2023-02-22 RX ADMIN — DULOXETINE HYDROCHLORIDE 60 MG: 60 CAPSULE, DELAYED RELEASE ORAL at 08:49

## 2023-02-22 RX ADMIN — Medication 10 ML: at 09:01

## 2023-02-22 RX ADMIN — ATORVASTATIN CALCIUM 10 MG: 10 TABLET, FILM COATED ORAL at 21:28

## 2023-02-22 RX ADMIN — INSULIN ASPART 10 UNITS: 100 INJECTION, SOLUTION INTRAVENOUS; SUBCUTANEOUS at 11:46

## 2023-02-22 RX ADMIN — CEFTRIAXONE 2 G: 2 INJECTION, POWDER, FOR SOLUTION INTRAMUSCULAR; INTRAVENOUS at 08:56

## 2023-02-22 RX ADMIN — INSULIN ASPART 8 UNITS: 100 INJECTION, SOLUTION INTRAVENOUS; SUBCUTANEOUS at 08:46

## 2023-02-22 RX ADMIN — INSULIN DETEMIR 55 UNITS: 100 INJECTION, SOLUTION SUBCUTANEOUS at 08:46

## 2023-02-22 RX ADMIN — METFORMIN HYDROCHLORIDE 1000 MG: 500 TABLET ORAL at 17:19

## 2023-02-22 RX ADMIN — OXYCODONE HYDROCHLORIDE AND ACETAMINOPHEN 500 MG: 500 TABLET ORAL at 08:49

## 2023-02-22 RX ADMIN — GABAPENTIN 800 MG: 400 CAPSULE ORAL at 16:24

## 2023-02-22 RX ADMIN — BACLOFEN 30 MG: 10 TABLET ORAL at 11:47

## 2023-02-22 RX ADMIN — INSULIN DETEMIR 55 UNITS: 100 INJECTION, SOLUTION SUBCUTANEOUS at 21:29

## 2023-02-22 RX ADMIN — INSULIN ASPART 35 UNITS: 100 INJECTION, SOLUTION INTRAVENOUS; SUBCUTANEOUS at 08:47

## 2023-02-22 RX ADMIN — SACUBITRIL AND VALSARTAN 1 TABLET: 24; 26 TABLET, FILM COATED ORAL at 21:29

## 2023-02-22 RX ADMIN — APIXABAN 5 MG: 5 TABLET, FILM COATED ORAL at 08:49

## 2023-02-22 RX ADMIN — VERICIGUAT 2.5 MG: 2.5 TABLET, FILM COATED ORAL at 11:47

## 2023-02-22 RX ADMIN — MODAFINIL 200 MG: 100 TABLET ORAL at 08:49

## 2023-02-22 RX ADMIN — DULOXETINE HYDROCHLORIDE 60 MG: 60 CAPSULE, DELAYED RELEASE ORAL at 21:29

## 2023-02-22 RX ADMIN — BACLOFEN 30 MG: 10 TABLET ORAL at 17:19

## 2023-02-22 RX ADMIN — SUCRALFATE 1 G: 1 TABLET ORAL at 08:49

## 2023-02-22 RX ADMIN — FERROUS SULFATE TAB 325 MG (65 MG ELEMENTAL FE) 325 MG: 325 (65 FE) TAB at 08:49

## 2023-02-22 RX ADMIN — PANTOPRAZOLE SODIUM 40 MG: 40 TABLET, DELAYED RELEASE ORAL at 21:31

## 2023-02-22 RX ADMIN — APIXABAN 5 MG: 5 TABLET, FILM COATED ORAL at 21:29

## 2023-02-22 NOTE — PLAN OF CARE
Goal Outcome Evaluation:  Plan of Care Reviewed With: patient        Progress: no change  Outcome Evaluation: Pt has rested in bed overnight. VSS on RA. Dressings CDI. No acute changes overnight.
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
## 2023-02-22 NOTE — PROGRESS NOTES
PROGRESS NOTE         Patient Identification:  Name:  Ken Krueger  Age:  47 y.o.  Sex:  male  :  1975  MRN:  [id number]  Visit Number:  [id number]  Primary Care Provider:  Franchesca Hodges APRN         LOS: 1 day       ----------------------------------------------------------------------------------------------------------------------  Subjective       Chief Complaints:    No chief complaint on file.        Interval History:      Patient resting comfortably in bed this morning.  Drowsy, sedate this morning on BiPAP.  Afebrile, no reported increase in ostomy output.  Lungs clear to auscultation bilaterally.  Abdomen distended, nontender.      Review of Systems:    Constitutional: no fever, chills and night sweats.  No fatigue.  Eyes: no eye drainage, itching or redness.  HEENT: no mouth sores, dysphagia or nose bleed.  Respiratory: no for shortness of breath, cough or production of sputum.  Cardiovascular: no chest pain, no palpitations, no orthopnea.  Gastrointestinal: no nausea, vomiting or diarrhea. No abdominal pain, hematemesis or rectal bleeding.  Genitourinary: no dysuria or polyuria.  Hematologic/lymphatic: no lymph node abnormalities, no easy bruising or easy bleeding.  Musculoskeletal: no muscle or joint pain.  Skin: No rash and no itching.  Decubitus ulcers.  Neurological: no loss of consciousness, no seizure, no headache.  Behavioral/Psych: no depression or suicidal ideation.  Endocrine: no hot flashes.  Immunologic: negative.    ----------------------------------------------------------------------------------------------------------------------      Objective       [facility] Meds:    Pharmacy Consult - Pharmacy to dose,       ----------------------------------------------------------------------------------------------------------------------    Vital Signs:  Temp:  [97.7 °F (36.5 °C)-98.9 °F (37.2 °C)] 98.3 °F (36.8 °C)  Heart Rate:  [81-95] 86  Resp:  [18] 18  BP:  (113-126)/(61-75) 126/72  Mean Arterial Pressure (Non-Invasive) for the past 24 hrs (Last 3 readings):   Noninvasive MAP (mmHg)   02/22/23 0600 77   02/21/23 1000 89     SpO2 Percentage    02/21/23 1000 02/21/23 1856 02/22/23 0600   SpO2: 96% 97% 94%     SpO2:  [94 %-97 %] 94 %  on   ;   Device (Oxygen Therapy): room air    Body mass index is 42.34 kg/m².  Wt Readings from Last 3 Encounters:   02/21/23 (!) 138 kg (303 lb 9.2 oz)   02/20/23 (!) 139 kg (306 lb 14.1 oz)   12/13/22 (!) 141 kg (310 lb 14.4 oz)        Intake/Output Summary (Last 24 hours) at 2/22/2023 0928  Last data filed at 2/22/2023 0312  Gross per 24 hour   Intake 840 ml   Output 4600 ml   Net -3760 ml     Diet: Diabetic Diets; Consistent Carbohydrate; Texture: Regular Texture (IDDSI 7); Fluid Consistency: Thin (IDDSI 0)  ----------------------------------------------------------------------------------------------------------------------      Physical Exam:    Constitutional: Very pleasant obese male drowsy this morning.  Currently on BiPAP.  HENT:  Head: Normocephalic and atraumatic.  Mouth:  Moist mucous membranes.    Eyes:  Conjunctivae and EOM are normal.  No scleral icterus.  Neck:  Neck supple.  No JVD present.    Cardiovascular:  Normal rate, regular rhythm and normal heart sounds with no murmur. No edema.  Pulmonary/Chest:  No respiratory distress, no wheezes, no crackles, with normal breath sounds and good air movement.  Lungs clear to auscultation bilaterally  Abdominal: Obese.  Soft.  Bowel sounds are normal.  No distension and no tenderness.  Ileal conduit and colostomy bag in place.  Musculoskeletal:  No tenderness.  No swelling or redness of joints.  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  1+ edema of right lower extremity.  PICC to right upper arm with no signs of infection.  Neurological:  Alert and oriented to person, place, and time.  No facial droop.  No slurred speech.   Skin:  Stage IV sacral decubitus wound status post excisional  debridement.  Psychiatric:  Normal mood and affect.  Behavior is normal.      ----------------------------------------------------------------------------------------------------------------------            Results from last 7 days   Lab Units 02/21/23  0150 02/17/23  0240 02/16/23  0056   WBC 10*3/mm3 12.20* 8.30 8.28   HEMOGLOBIN g/dL 11.0* 10.7* 10.6*   HEMATOCRIT % 38.4 36.3* 36.1*   MCV fL 92.1 91.0 90.9   MCHC g/dL 28.6* 29.5* 29.4*   PLATELETS 10*3/mm3 365 206 337     Results from last 7 days   Lab Units 02/21/23  0150 02/20/23  0957 02/17/23  0240   SODIUM mmol/L 137 134* 131*   POTASSIUM mmol/L 3.7 4.0 3.6   CHLORIDE mmol/L 100 98 96*   CO2 mmol/L 23.6 26.5 25.6   BUN mg/dL 24* 24* 21*   CREATININE mg/dL 0.97 0.80 1.08   CALCIUM mg/dL 9.4 9.3 9.0   GLUCOSE mg/dL 257* 401* 382*   Estimated Creatinine Clearance: 136 mL/min (by C-G formula based on SCr of 0.97 mg/dL).  No results found for: AMMONIA    Glucose   Date/Time Value Ref Range Status   02/22/2023 0619 273 (H) 70 - 130 mg/dL Final     Comment:     Meter: [id number] : [id number] DEVIN GALEAS   02/21/2023 2127 183 (H) 70 - 130 mg/dL Final     Comment:     Meter: [id number] : [id number] SIMONE PETTY   02/21/2023 1645 171 (H) 70 - 130 mg/dL Final     Comment:     Meter: [id number] : [id number] BERT [facility]   02/21/2023 1201 222 (H) 70 - 130 mg/dL Final     Comment:     Meter: [id number] : [id number] DOMINGUEZSUZY [facility]   02/21/2023 0725 260 (H) 70 - 130 mg/dL Final     Comment:     Meter: [id number] : [id number] ROSA MARIA FLORES   02/20/2023 2218 204 (H) 70 - 130 mg/dL Final     Comment:     Meter: [id number] : [id number] SIMONE PETTY   02/20/2023 1705 190 (H) 70 - 130 mg/dL Final     Comment:     Meter: [id number] : [id number] DILLAN LYNN   02/20/2023 1046 339 (H) 70 - 130 mg/dL Final     Comment:     Meter: [id number] : [id number] [facility]     Lab Results   Component Value Date    HGBA1C 11.50 (H) 02/13/2023      Lab Results   Component Value Date    TSH 3.780 07/19/2022    FREET4 1.31 06/02/2021       Blood Culture   Date Value Ref Range Status   02/12/2023 No growth at 24 hours  Preliminary   02/12/2023 No growth at 24 hours  Preliminary     No results found for: URINECX  No results found for: WOUNDCX  No results found for: STOOLCX  No results found for: RESPCX  Pain Management Panel       Pain Management Panel Latest Ref Rng & Units 6/2/2021 8/19/2018    AMPHETAMINES SCREEN, URINE Negative Negative Negative    BARBITURATES SCREEN Negative Negative Negative    BENZODIAZEPINE SCREEN, URINE Negative Negative Negative    BUPRENORPHINEUR Negative Negative Negative    COCAINE SCREEN, URINE Negative Negative Negative    METHADONE SCREEN, URINE Negative Negative Negative              ----------------------------------------------------------------------------------------------------------------------  Imaging Results (Last 24 Hours)       ** No results found for the last 24 hours. **            -----------------------------------------------------------------------------------  Pertinent Infectious Disease Results     CT abdomen pelvis with contrast on 2/12/2023 showed there is a new decubitus ulcer just to the right of midline at the level of the coccyx with constellation of imaging findings most characteristic of acute infection/osteomyelitis. Small pocket of air and fluid adjacent to the coccyx measuring 3.7 x 4.1 cm could reflect phlegmon or abscess. Chronic bilateral decubitus ulcers at the level of the ischial tuberosities bilaterally with imaging findings suggestive of chronic infection/osteomyelitis, similar to prior. Hepatic steatosis and hepatomegaly with borderline splenomegaly. Nonobstructing left renal stones. Postoperative changes of left lower quadrant and colostomy and right mid abdominal ileal conduit formation. Diverticulosis. No diverticulitis or bowel obstruction. COVID-19 and flu A/B PCR on 2/12/2023 was  negative.    Status post excisional debridement of sacral ulcer on 2/13/2023 with Dr. Taylor.       Assessment/Plan         ASSESSMENT:    Septic shock with lactic acid greater than 4 on admission  Acute and chronic decubitus ulcerations with osteomyelitis and possible abscess      PLAN:    I saw the patient this morning and discussed the case and plan of care with GUANACO Flores and got report from primary RN and here are my findings:    This morning, the patient was resting comfortably in bed, but was noted to be drowsy and sedated on BiPAP. Despite this, the patient was afebrile, and there was no reported increase in ostomy output. Upon auscultation, clear lungs were observed bilaterally. The patient's abdomen was distended but nontender.    Based on the finalization of the bone and tissue culture with E. coli, it is recommended that the patient continue on ceftriaxone through 3/29/2023 for the treatment of osteomyelitis.    In summary, the patient was resting comfortably in bed but appeared drowsy and sedated while on BiPAP. The patient was afebrile, and no increase in ostomy output was observed. Upon auscultation, clear lungs were observed bilaterally. The patient's abdomen was distended but nontender. Based on the finalization of the bone and tissue culture with E. coli, it was recommended that the patient continue on ceftriaxone through 3/29/2023 for the treatment of osteomyelitis.      Code Status:   Code Status and Medical Interventions:   Ordered at: 02/21/23 1034     Code Status (Patient has no pulse and is not breathing):    CPR (Attempt to Resuscitate)     Medical Interventions (Patient has pulse or is breathing):    Full Support       GUANACO Flores  02/22/23  09:37 EST    Electronically signed by GUANACO Flores, 02/22/23, 9:38 AM EST.      Electronically signed by Tra Jain MD, 02/22/23, 10:12 AM EST.

## 2023-02-22 NOTE — PLAN OF CARE
Goal Outcome Evaluation:              Outcome Evaluation: Pt seen for evaluation this date s/p swing inpatient placement. Currently pt reports he is about at or near baseline PLOF. Pt does not demonstrate need for skilled therapy services during stay as he is getting abx tx. Pt does not voice need of other equipment to his knowledge. D/C rec for return home with 24/7 assist and care, supervision.

## 2023-02-22 NOTE — PROGRESS NOTES
Patient not seen and examined at bedside today.  Patient admitted to swing bed yesterday on 2/21/2023.  Patient chart and vitals and labs reviewed.  Patient currently receiving monotherapy with rocephin With planned course of treatment through 3/29/2023. Patient previously seen and evaluated by PT and OT and plan for discharge to return home with 24/7 assist in care as patient was previously on at home.  Continue wound care and antibiotic therapy.

## 2023-02-22 NOTE — PLAN OF CARE
Goal Outcome Evaluation:  Plan of Care Reviewed With: patient        Progress: no change  Outcome Evaluation: Pt rested well throughout the day. Wound care done per order. No complaints or acute changes noted at this time. Will continue to follow plan of care.
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
## 2023-02-23 LAB
GLUCOSE BLDC GLUCOMTR-MCNC: 144 MG/DL (ref 70–130)
GLUCOSE BLDC GLUCOMTR-MCNC: 222 MG/DL (ref 70–130)
GLUCOSE BLDC GLUCOMTR-MCNC: 245 MG/DL (ref 70–130)
GLUCOSE BLDC GLUCOMTR-MCNC: 270 MG/DL (ref 70–130)
INDURATION: 0 MM (ref 0–10)
Lab: NORMAL
Lab: NORMAL
TB SKIN TEST: NEGATIVE

## 2023-02-23 PROCEDURE — 63710000001 INSULIN DETEMIR PER 5 UNITS: Performed by: STUDENT IN AN ORGANIZED HEALTH CARE EDUCATION/TRAINING PROGRAM

## 2023-02-23 PROCEDURE — 82962 GLUCOSE BLOOD TEST: CPT

## 2023-02-23 PROCEDURE — 63710000001 INSULIN ASPART PER 5 UNITS: Performed by: STUDENT IN AN ORGANIZED HEALTH CARE EDUCATION/TRAINING PROGRAM

## 2023-02-23 PROCEDURE — 99309 SBSQ NF CARE MODERATE MDM 30: CPT | Performed by: INTERNAL MEDICINE

## 2023-02-23 PROCEDURE — 25010000002 CEFTRIAXONE PER 250 MG: Performed by: STUDENT IN AN ORGANIZED HEALTH CARE EDUCATION/TRAINING PROGRAM

## 2023-02-23 RX ORDER — GLIPIZIDE 5 MG/1
5 TABLET ORAL
Status: DISCONTINUED | OUTPATIENT
Start: 2023-02-24 | End: 2023-02-25

## 2023-02-23 RX ADMIN — MAGNESIUM GLUCONATE 500 MG ORAL TABLET 1200 MG: 500 TABLET ORAL at 09:02

## 2023-02-23 RX ADMIN — ARIPIPRAZOLE 10 MG: 10 TABLET ORAL at 21:12

## 2023-02-23 RX ADMIN — APIXABAN 5 MG: 5 TABLET, FILM COATED ORAL at 21:12

## 2023-02-23 RX ADMIN — NYSTATIN: 100000 POWDER TOPICAL at 08:58

## 2023-02-23 RX ADMIN — BUMETANIDE 2 MG: 1 TABLET ORAL at 09:01

## 2023-02-23 RX ADMIN — Medication 1000 UNITS: at 09:02

## 2023-02-23 RX ADMIN — GABAPENTIN 800 MG: 400 CAPSULE ORAL at 15:44

## 2023-02-23 RX ADMIN — MODAFINIL 200 MG: 100 TABLET ORAL at 09:07

## 2023-02-23 RX ADMIN — SUCRALFATE 1 G: 1 TABLET ORAL at 09:01

## 2023-02-23 RX ADMIN — DICYCLOMINE HYDROCHLORIDE 10 MG: 10 CAPSULE ORAL at 09:00

## 2023-02-23 RX ADMIN — Medication 10 ML: at 08:58

## 2023-02-23 RX ADMIN — GABAPENTIN 800 MG: 400 CAPSULE ORAL at 21:12

## 2023-02-23 RX ADMIN — EMPAGLIFLOZIN 10 MG: 10 TABLET, FILM COATED ORAL at 09:02

## 2023-02-23 RX ADMIN — SPIRONOLACTONE 25 MG: 25 TABLET ORAL at 09:03

## 2023-02-23 RX ADMIN — INSULIN ASPART 8 UNITS: 100 INJECTION, SOLUTION INTRAVENOUS; SUBCUTANEOUS at 12:00

## 2023-02-23 RX ADMIN — METFORMIN HYDROCHLORIDE 1000 MG: 500 TABLET ORAL at 17:01

## 2023-02-23 RX ADMIN — INSULIN ASPART 35 UNITS: 100 INJECTION, SOLUTION INTRAVENOUS; SUBCUTANEOUS at 12:00

## 2023-02-23 RX ADMIN — INSULIN DETEMIR 55 UNITS: 100 INJECTION, SOLUTION SUBCUTANEOUS at 21:12

## 2023-02-23 RX ADMIN — CARVEDILOL 12.5 MG: 6.25 TABLET, FILM COATED ORAL at 08:58

## 2023-02-23 RX ADMIN — NYSTATIN: 100000 POWDER TOPICAL at 21:13

## 2023-02-23 RX ADMIN — INSULIN ASPART 5 UNITS: 100 INJECTION, SOLUTION INTRAVENOUS; SUBCUTANEOUS at 16:58

## 2023-02-23 RX ADMIN — Medication 1 CAPSULE: at 09:03

## 2023-02-23 RX ADMIN — PANTOPRAZOLE SODIUM 40 MG: 40 TABLET, DELAYED RELEASE ORAL at 21:12

## 2023-02-23 RX ADMIN — FERROUS SULFATE TAB 325 MG (65 MG ELEMENTAL FE) 325 MG: 325 (65 FE) TAB at 09:01

## 2023-02-23 RX ADMIN — DULOXETINE HYDROCHLORIDE 60 MG: 60 CAPSULE, DELAYED RELEASE ORAL at 21:12

## 2023-02-23 RX ADMIN — METFORMIN HYDROCHLORIDE 1000 MG: 500 TABLET ORAL at 08:59

## 2023-02-23 RX ADMIN — BACLOFEN 30 MG: 10 TABLET ORAL at 12:00

## 2023-02-23 RX ADMIN — INSULIN DETEMIR 55 UNITS: 100 INJECTION, SOLUTION SUBCUTANEOUS at 08:57

## 2023-02-23 RX ADMIN — PANTOPRAZOLE SODIUM 40 MG: 40 TABLET, DELAYED RELEASE ORAL at 09:00

## 2023-02-23 RX ADMIN — APIXABAN 5 MG: 5 TABLET, FILM COATED ORAL at 09:00

## 2023-02-23 RX ADMIN — VERICIGUAT 2.5 MG: 2.5 TABLET, FILM COATED ORAL at 09:04

## 2023-02-23 RX ADMIN — DICYCLOMINE HYDROCHLORIDE 10 MG: 10 CAPSULE ORAL at 21:12

## 2023-02-23 RX ADMIN — DULOXETINE HYDROCHLORIDE 60 MG: 60 CAPSULE, DELAYED RELEASE ORAL at 09:00

## 2023-02-23 RX ADMIN — SACUBITRIL AND VALSARTAN 1 TABLET: 24; 26 TABLET, FILM COATED ORAL at 21:12

## 2023-02-23 RX ADMIN — INSULIN ASPART 5 UNITS: 100 INJECTION, SOLUTION INTRAVENOUS; SUBCUTANEOUS at 08:57

## 2023-02-23 RX ADMIN — BACLOFEN 30 MG: 10 TABLET ORAL at 21:12

## 2023-02-23 RX ADMIN — GABAPENTIN 800 MG: 400 CAPSULE ORAL at 08:56

## 2023-02-23 RX ADMIN — BACLOFEN 30 MG: 10 TABLET ORAL at 08:59

## 2023-02-23 RX ADMIN — INSULIN ASPART 35 UNITS: 100 INJECTION, SOLUTION INTRAVENOUS; SUBCUTANEOUS at 17:01

## 2023-02-23 RX ADMIN — INSULIN ASPART 35 UNITS: 100 INJECTION, SOLUTION INTRAVENOUS; SUBCUTANEOUS at 08:57

## 2023-02-23 RX ADMIN — CARVEDILOL 12.5 MG: 6.25 TABLET, FILM COATED ORAL at 17:01

## 2023-02-23 RX ADMIN — SACUBITRIL AND VALSARTAN 1 TABLET: 24; 26 TABLET, FILM COATED ORAL at 09:00

## 2023-02-23 RX ADMIN — VITAMINS A AND D OINTMENT 1 APPLICATION: 15.5; 53.4 OINTMENT TOPICAL at 08:58

## 2023-02-23 RX ADMIN — Medication 10 ML: at 21:12

## 2023-02-23 RX ADMIN — ATORVASTATIN CALCIUM 10 MG: 10 TABLET, FILM COATED ORAL at 21:12

## 2023-02-23 RX ADMIN — VITAMINS A AND D OINTMENT 1 APPLICATION: 15.5; 53.4 OINTMENT TOPICAL at 21:13

## 2023-02-23 RX ADMIN — Medication 1 TABLET: at 09:02

## 2023-02-23 RX ADMIN — CEFTRIAXONE 2 G: 2 INJECTION, POWDER, FOR SOLUTION INTRAMUSCULAR; INTRAVENOUS at 08:56

## 2023-02-23 RX ADMIN — BACLOFEN 30 MG: 10 TABLET ORAL at 17:01

## 2023-02-23 RX ADMIN — OXYCODONE HYDROCHLORIDE AND ACETAMINOPHEN 500 MG: 500 TABLET ORAL at 09:03

## 2023-02-23 NOTE — PROGRESS NOTES
Nutrition Services    Patient Name:  Ken Krueger  YOB: 1975  MRN: [id number]  Admit Date:  2/21/2023    BMI: 42.34  Diet: Regular, consistent carb  Supplement Thanh BID  Intake: % x 4 meals  Fluid: not meeting needs    Electronically signed by:  Karin Aceves RD  02/23/23 08:42 EST

## 2023-02-23 NOTE — PROGRESS NOTES
PROGRESS NOTE         Patient Identification:  Name:  Ken Krueger  Age:  47 y.o.  Sex:  male  :  1975  MRN:  [id number]  Visit Number:  [id number]  Primary Care Provider:  Franchesca Hodges APRN         LOS: 2 days       ----------------------------------------------------------------------------------------------------------------------  Subjective       Chief Complaints:    No chief complaint on file.        Interval History:      Patient is sitting up in bed on room air no apparent distress.  Complaining of some upper back pain this morning but there is no tenderness to palpation, erythema, or warmth.  Denies any other complaints at this time including shortness of breath, cough, chest pain, abdominal pain, nausea, vomiting, or diarrhea.  Lungs are clear to auscultation bilaterally.  Abdomen is soft, nontender, normal bowel sounds.  Nurse reports no acute changes.  Afebrile, no diarrhea.  No new labs today.    Review of Systems:    Constitutional: no fever, chills and night sweats.  No fatigue.  Eyes: no eye drainage, itching or redness.  HEENT: no mouth sores, dysphagia or nose bleed.  Respiratory: no for shortness of breath, cough or production of sputum.  Cardiovascular: no chest pain, no palpitations, no orthopnea.  Gastrointestinal: no nausea, vomiting or diarrhea. No abdominal pain, hematemesis or rectal bleeding.  Genitourinary: no dysuria or polyuria.  Hematologic/lymphatic: no lymph node abnormalities, no easy bruising or easy bleeding.  Musculoskeletal: Upper back pain.  Skin: No rash and no itching.  Decubitus ulcers.  Neurological: no loss of consciousness, no seizure, no headache.  Behavioral/Psych: no depression or suicidal ideation.  Endocrine: no hot flashes.  Immunologic: negative.    ----------------------------------------------------------------------------------------------------------------------      Objective       Current Hospital Meds:    Pharmacy Consult - Pharmacy to  dose,       ----------------------------------------------------------------------------------------------------------------------    Vital Signs:  Temp:  [96.9 °F (36.1 °C)-97.3 °F (36.3 °C)] 96.9 °F (36.1 °C)  Heart Rate:  [89-96] 89  Resp:  [18] 18  BP: (105-131)/(60-77) 116/77  Mean Arterial Pressure (Non-Invasive) for the past 24 hrs (Last 3 readings):   Noninvasive MAP (mmHg)   02/22/23 1431 78     SpO2 Percentage    02/22/23 0600 02/22/23 1047 02/22/23 1431   SpO2: 94% 90% 93%     SpO2:  [93 %] 93 %  on   ;   Device (Oxygen Therapy): room air    Body mass index is 42.34 kg/m².  Wt Readings from Last 3 Encounters:   02/21/23 (!) 138 kg (303 lb 9.2 oz)   02/20/23 (!) 139 kg (306 lb 14.1 oz)   12/13/22 (!) 141 kg (310 lb 14.4 oz)        Intake/Output Summary (Last 24 hours) at 2/23/2023 1054  Last data filed at 2/23/2023 1024  Gross per 24 hour   Intake 660 ml   Output 5250 ml   Net -4590 ml     Diet: Diabetic Diets; Consistent Carbohydrate; Texture: Regular Texture (IDDSI 7); Fluid Consistency: Thin (IDDSI 0)  ----------------------------------------------------------------------------------------------------------------------      Physical Exam:    Constitutional: Very pleasant obese male is sitting up in bed on room air no apparent distress.  HENT:  Head: Normocephalic and atraumatic.  Mouth:  Moist mucous membranes.    Eyes:  Conjunctivae and EOM are normal.  No scleral icterus.  Neck:  Neck supple.  No JVD present.    Cardiovascular:  Normal rate, regular rhythm and normal heart sounds with no murmur. No edema.  Pulmonary/Chest:  No respiratory distress, no wheezes, no crackles, with normal breath sounds and good air movement.  Lungs clear to auscultation bilaterally  Abdominal: Obese.  Soft.  Bowel sounds are normal.  No distension and no tenderness.  Ileal conduit and colostomy bag in place.  Musculoskeletal:  No tenderness.  No swelling or redness of joints.  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  1+ edema of right  lower extremity.  PICC to right upper arm with no signs of infection.  Neurological:  Alert and oriented to person, place, and time.  No facial droop.  No slurred speech.   Skin:  Stage IV sacral decubitus wound status post excisional debridement.  Psychiatric:  Normal mood and affect.  Behavior is normal.      ----------------------------------------------------------------------------------------------------------------------            Results from last 7 days   Lab Units 02/21/23  0150 02/17/23  0240   WBC 10*3/mm3 12.20* 8.30   HEMOGLOBIN g/dL 11.0* 10.7*   HEMATOCRIT % 38.4 36.3*   MCV fL 92.1 91.0   MCHC g/dL 28.6* 29.5*   PLATELETS 10*3/mm3 365 206     Results from last 7 days   Lab Units 02/21/23  0150 02/20/23  0957 02/17/23  0240   SODIUM mmol/L 137 134* 131*   POTASSIUM mmol/L 3.7 4.0 3.6   CHLORIDE mmol/L 100 98 96*   CO2 mmol/L 23.6 26.5 25.6   BUN mg/dL 24* 24* 21*   CREATININE mg/dL 0.97 0.80 1.08   CALCIUM mg/dL 9.4 9.3 9.0   GLUCOSE mg/dL 257* 401* 382*   Estimated Creatinine Clearance: 136 mL/min (by C-G formula based on SCr of 0.97 mg/dL).  No results found for: AMMONIA    Glucose   Date/Time Value Ref Range Status   02/23/2023 0636 245 (H) 70 - 130 mg/dL Final     Comment:     Meter: [id number] : [id number] MERCEDES DELMAR   02/22/2023 2128 218 (H) 70 - 130 mg/dL Final     Comment:     Meter: [id number] : [id number] MARY JANE FUSION   02/22/2023 1625 184 (H) 70 - 130 mg/dL Final     Comment:     Meter: [id number] : [id number] IRMA EFREM   02/22/2023 1004 312 (H) 70 - 130 mg/dL Final     Comment:     Meter: [id number] : [id number] DEVIN GALEAS   02/22/2023 0619 273 (H) 70 - 130 mg/dL Final     Comment:     Meter: [id number] : [id number] DEVIN GALEAS   02/21/2023 2127 183 (H) 70 - 130 mg/dL Final     Comment:     Meter: [id number] : [id number] SIMONE PETTY   02/21/2023 1645 171 (H) 70 - 130 mg/dL Final     Comment:     Meter: [id number] : [id number] BERT SALOMON    02/21/2023 1201 222 (H) 70 - 130 mg/dL Final     Comment:     Meter: [id number] : [id number] [facility]     Lab Results   Component Value Date    HGBA1C 11.50 (H) 02/13/2023     Lab Results   Component Value Date    TSH 3.780 07/19/2022    FREET4 1.31 06/02/2021       Blood Culture   Date Value Ref Range Status   02/12/2023 No growth at 24 hours  Preliminary   02/12/2023 No growth at 24 hours  Preliminary     No results found for: URINECX  No results found for: WOUNDCX  No results found for: STOOLCX  No results found for: RESPCX  Pain Management Panel       Pain Management Panel Latest Ref Rng & Units 6/2/2021 8/19/2018    AMPHETAMINES SCREEN, URINE Negative Negative Negative    BARBITURATES SCREEN Negative Negative Negative    BENZODIAZEPINE SCREEN, URINE Negative Negative Negative    BUPRENORPHINEUR Negative Negative Negative    COCAINE SCREEN, URINE Negative Negative Negative    METHADONE SCREEN, URINE Negative Negative Negative              ----------------------------------------------------------------------------------------------------------------------  Imaging Results (Last 24 Hours)       ** No results found for the last 24 hours. **            -----------------------------------------------------------------------------------  Pertinent Infectious Disease Results     CT abdomen pelvis with contrast on 2/12/2023 showed there is a new decubitus ulcer just to the right of midline at the level of the coccyx with constellation of imaging findings most characteristic of acute infection/osteomyelitis. Small pocket of air and fluid adjacent to the coccyx measuring 3.7 x 4.1 cm could reflect phlegmon or abscess. Chronic bilateral decubitus ulcers at the level of the ischial tuberosities bilaterally with imaging findings suggestive of chronic infection/osteomyelitis, similar to prior. Hepatic steatosis and hepatomegaly with borderline splenomegaly. Nonobstructing left renal stones. Postoperative changes of  left lower quadrant and colostomy and right mid abdominal ileal conduit formation. Diverticulosis. No diverticulitis or bowel obstruction. COVID-19 and flu A/B PCR on 2/12/2023 was negative.    Status post excisional debridement of sacral ulcer on 2/13/2023 with Dr. Taylor.       Assessment/Plan         ASSESSMENT:    Septic shock with lactic acid greater than 4 on admission  Acute and chronic decubitus ulcerations with osteomyelitis and possible abscess      PLAN:    I have seen and examined the patient myself this morning and discussed the case with Christy Caro PA-C and discussed overnight changes with primary RN here are my findings:    The patient is currently sitting up in bed and appears to be comfortable breathing room air without any signs of distress. However, the patient has reported experiencing some upper back pain this morning. Despite this, there is no tenderness to palpation, erythema, or warmth. The patient has denied any other complaints, including shortness of breath, cough, chest pain, abdominal pain, nausea, vomiting, or diarrhea.    Upon auscultation, the patient's lungs are clear bilaterally. Additionally, the patient's abdomen is soft, nontender, and displaying normal bowel sounds. The nurse has reported no acute changes in the patient's condition. The patient is afebrile and not experiencing diarrhea. There have been no new labs for the day, and it is recommended that the patient continue the Rocephin treatment through 3/29/2023.      Code Status:   Code Status and Medical Interventions:   Ordered at: 02/21/23 1034     Code Status (Patient has no pulse and is not breathing):    CPR (Attempt to Resuscitate)     Medical Interventions (Patient has pulse or is breathing):    Full Support       Christy Caro PA-C  02/23/23  10:54 EST    Electronically signed by Christy Caro PA-C, 02/23/23, 10:57 AM EST.      Electronically signed by Tra Jain MD, 02/23/23, 12:04 PM  EST.

## 2023-02-23 NOTE — PLAN OF CARE
Goal Outcome Evaluation:              Outcome Evaluation: Patient has rested in bed this shift, Dressings CDI, no complaints or request at this time, VSS,Will continue plan of care.

## 2023-02-23 NOTE — PLAN OF CARE
Goal Outcome Evaluation:  Plan of Care Reviewed With: patient        Progress: no change  Outcome Evaluation: Pt's resting in bed, A&O. No complaints or acute changes noted at this time. Wound care done per order. Will continue to follow plan of care.

## 2023-02-23 NOTE — PROGRESS NOTES
Patient briefly seen at bedside today.  He was napping at time of exam but easily awoken and denies any acute complaints at time of my exam.  Did discuss importance of using his NIPPV while napping or sleeping at night.  We will plan for repeat labs tomorrow.  Patient admitted to swing bed on 2/21/2023.  Patient chart and vitals and labs reviewed.  Patient currently receiving monotherapy with rocephin With planned course of treatment through 3/29/2023. Patient previously seen and evaluated by PT and OT and plan for discharge to return home with 24/7 assist in care as patient was previously on at home.  Continue wound care and antibiotic therapy.  ID continue to follow along while in swing bed.

## 2023-02-23 NOTE — CASE MANAGEMENT/SOCIAL WORK
Discharge Planning Assessment   [place]     Patient Name: Ken Krueger  MRN: [id number]  Today's Date: 2/23/2023    Admit Date: 2/21/2023    Plan: Pt was admitted to swing bed on 2/21/23. Pt lives with his mother and brother and he plans to return home at discharge ([address] [place] in [facility]). Pt utilized [facility] for home health services prior to admission. [facility] [phone] to be contacted at discharge. Pt has a hospital bed, patricio lift, trapeze bar, and wheelchair via [facility] and an electric wheelchair. PCP is Franchesca Hodges. POA is brotherPineda. Pt does not have a living will on file. SS to follow and assist as needed with discharge planning.   Discharge Needs Assessment     Row Name 02/23/23 0841       Living Environment    People in Home sibling(s);parent(s)    Name(s) of People in Home Pt lives with his mother and brother.    Current Living Arrangements home    Primary Care Provided by other (see comments)    Provides Primary Care For no one, unable/limited ability to care for self    Family Caregiver if Needed sibling(s)    Quality of Family Relationships helpful;involved;supportive  Pineda Benoit is pt's primary caregiver.    Able to Return to Prior Arrangements yes       Transition Planning    Patient/Family Anticipates Transition to home with family;home with help/services    Transportation Anticipated --  Transportation to be determined at discharge.       Discharge Needs Assessment    Equipment Currently Used at Home hospital bed;grab bar;lift device;wheelchair, motorized;wheelchair  hospital bed, trapeze bar, patricio lift, wheelchair via [facility] and an electric wheelchair               Discharge Plan     Row Name 02/23/23 0846       Plan    Plan Pt was admitted to swing bed on 2/21/23. Pt lives with his mother and brother and he plans to return home at discharge ([address] [place] in [facility]). Pt utilized [facility]  for home health services prior to admission. [facility] [phone] to be contacted at discharge. Pt has a hospital bed, patricio lift, trapeze bar, and wheelchair via [facility] and an electric wheelchair. PCP is Franchesca Hodges. POA is brother, Pineda Krueger. Pt does not have a living will on file. SS to follow and assist as needed with discharge planning.    Patient/Family in Agreement with Plan yes              Continued Care and Services - Admitted Since 2/21/2023     Home Medical Care     Service Provider Request Status Selected Services Address Phone Fax Patient Preferred    [facility] Pending - No Request Sent N/A [address] [facility] [id number] [phone] -- --                 Demographic Summary     Row Name 02/23/23 0846       General Information    Referral Source physician    Reason for Consult --  SS received consult for swing bed patient. Pt was admitted to swing bed on 2/21/23.            BRENT Burns

## 2023-02-24 LAB
ANION GAP SERPL CALCULATED.3IONS-SCNC: 13.9 MMOL/L (ref 5–15)
BUN SERPL-MCNC: 39 MG/DL (ref 6–20)
BUN/CREAT SERPL: 38.6 (ref 7–25)
CALCIUM SPEC-SCNC: 9.9 MG/DL (ref 8.6–10.5)
CHLORIDE SERPL-SCNC: 97 MMOL/L (ref 98–107)
CO2 SERPL-SCNC: 24.1 MMOL/L (ref 22–29)
CREAT SERPL-MCNC: 1.01 MG/DL (ref 0.76–1.27)
DEPRECATED RDW RBC AUTO: 47.8 FL (ref 37–54)
EGFRCR SERPLBLD CKD-EPI 2021: 93.5 ML/MIN/1.73
ERYTHROCYTE [DISTWIDTH] IN BLOOD BY AUTOMATED COUNT: 14.5 % (ref 12.3–15.4)
GLUCOSE BLDC GLUCOMTR-MCNC: 187 MG/DL (ref 70–130)
GLUCOSE BLDC GLUCOMTR-MCNC: 200 MG/DL (ref 70–130)
GLUCOSE BLDC GLUCOMTR-MCNC: 211 MG/DL (ref 70–130)
GLUCOSE BLDC GLUCOMTR-MCNC: 273 MG/DL (ref 70–130)
GLUCOSE SERPL-MCNC: 249 MG/DL (ref 65–99)
HCT VFR BLD AUTO: 38.7 % (ref 37.5–51)
HGB BLD-MCNC: 11.4 G/DL (ref 13–17.7)
MCH RBC QN AUTO: 26.9 PG (ref 26.6–33)
MCHC RBC AUTO-ENTMCNC: 29.5 G/DL (ref 31.5–35.7)
MCV RBC AUTO: 91.3 FL (ref 79–97)
PLATELET # BLD AUTO: 320 10*3/MM3 (ref 140–450)
PMV BLD AUTO: 9.6 FL (ref 6–12)
POTASSIUM SERPL-SCNC: 4.2 MMOL/L (ref 3.5–5.2)
RBC # BLD AUTO: 4.24 10*6/MM3 (ref 4.14–5.8)
SODIUM SERPL-SCNC: 135 MMOL/L (ref 136–145)
WBC NRBC COR # BLD: 11.11 10*3/MM3 (ref 3.4–10.8)

## 2023-02-24 PROCEDURE — 25010000002 CEFTRIAXONE PER 250 MG: Performed by: STUDENT IN AN ORGANIZED HEALTH CARE EDUCATION/TRAINING PROGRAM

## 2023-02-24 PROCEDURE — 82962 GLUCOSE BLOOD TEST: CPT

## 2023-02-24 PROCEDURE — 63710000001 INSULIN DETEMIR PER 5 UNITS: Performed by: STUDENT IN AN ORGANIZED HEALTH CARE EDUCATION/TRAINING PROGRAM

## 2023-02-24 PROCEDURE — 85027 COMPLETE CBC AUTOMATED: CPT | Performed by: STUDENT IN AN ORGANIZED HEALTH CARE EDUCATION/TRAINING PROGRAM

## 2023-02-24 PROCEDURE — 80048 BASIC METABOLIC PNL TOTAL CA: CPT | Performed by: STUDENT IN AN ORGANIZED HEALTH CARE EDUCATION/TRAINING PROGRAM

## 2023-02-24 PROCEDURE — 63710000001 INSULIN ASPART PER 5 UNITS: Performed by: STUDENT IN AN ORGANIZED HEALTH CARE EDUCATION/TRAINING PROGRAM

## 2023-02-24 PROCEDURE — 99308 SBSQ NF CARE LOW MDM 20: CPT | Performed by: INTERNAL MEDICINE

## 2023-02-24 RX ADMIN — PANTOPRAZOLE SODIUM 40 MG: 40 TABLET, DELAYED RELEASE ORAL at 08:52

## 2023-02-24 RX ADMIN — EMPAGLIFLOZIN 10 MG: 10 TABLET, FILM COATED ORAL at 08:52

## 2023-02-24 RX ADMIN — CEFTRIAXONE 2 G: 2 INJECTION, POWDER, FOR SOLUTION INTRAMUSCULAR; INTRAVENOUS at 08:52

## 2023-02-24 RX ADMIN — APIXABAN 5 MG: 5 TABLET, FILM COATED ORAL at 20:54

## 2023-02-24 RX ADMIN — BUMETANIDE 2 MG: 1 TABLET ORAL at 08:52

## 2023-02-24 RX ADMIN — DULOXETINE HYDROCHLORIDE 60 MG: 60 CAPSULE, DELAYED RELEASE ORAL at 08:52

## 2023-02-24 RX ADMIN — SACUBITRIL AND VALSARTAN 1 TABLET: 24; 26 TABLET, FILM COATED ORAL at 20:55

## 2023-02-24 RX ADMIN — Medication 10 ML: at 20:56

## 2023-02-24 RX ADMIN — SUCRALFATE 1 G: 1 TABLET ORAL at 08:55

## 2023-02-24 RX ADMIN — Medication 1 CAPSULE: at 08:52

## 2023-02-24 RX ADMIN — Medication 10 ML: at 08:53

## 2023-02-24 RX ADMIN — NYSTATIN: 100000 POWDER TOPICAL at 20:55

## 2023-02-24 RX ADMIN — GLIPIZIDE 5 MG: 5 TABLET ORAL at 08:50

## 2023-02-24 RX ADMIN — VITAMINS A AND D OINTMENT 1 APPLICATION: 15.5; 53.4 OINTMENT TOPICAL at 20:55

## 2023-02-24 RX ADMIN — OXYCODONE HYDROCHLORIDE AND ACETAMINOPHEN 500 MG: 500 TABLET ORAL at 08:50

## 2023-02-24 RX ADMIN — NYSTATIN: 100000 POWDER TOPICAL at 08:53

## 2023-02-24 RX ADMIN — Medication 1000 UNITS: at 08:51

## 2023-02-24 RX ADMIN — ATORVASTATIN CALCIUM 10 MG: 10 TABLET, FILM COATED ORAL at 20:54

## 2023-02-24 RX ADMIN — MODAFINIL 200 MG: 100 TABLET ORAL at 08:51

## 2023-02-24 RX ADMIN — INSULIN ASPART 8 UNITS: 100 INJECTION, SOLUTION INTRAVENOUS; SUBCUTANEOUS at 12:04

## 2023-02-24 RX ADMIN — BACLOFEN 30 MG: 10 TABLET ORAL at 20:54

## 2023-02-24 RX ADMIN — BACLOFEN 30 MG: 10 TABLET ORAL at 08:50

## 2023-02-24 RX ADMIN — GABAPENTIN 800 MG: 400 CAPSULE ORAL at 20:56

## 2023-02-24 RX ADMIN — INSULIN ASPART 35 UNITS: 100 INJECTION, SOLUTION INTRAVENOUS; SUBCUTANEOUS at 12:04

## 2023-02-24 RX ADMIN — CARVEDILOL 12.5 MG: 6.25 TABLET, FILM COATED ORAL at 08:49

## 2023-02-24 RX ADMIN — DICYCLOMINE HYDROCHLORIDE 10 MG: 10 CAPSULE ORAL at 08:51

## 2023-02-24 RX ADMIN — METFORMIN HYDROCHLORIDE 1000 MG: 500 TABLET ORAL at 17:25

## 2023-02-24 RX ADMIN — CARVEDILOL 12.5 MG: 6.25 TABLET, FILM COATED ORAL at 17:25

## 2023-02-24 RX ADMIN — INSULIN ASPART 5 UNITS: 100 INJECTION, SOLUTION INTRAVENOUS; SUBCUTANEOUS at 17:26

## 2023-02-24 RX ADMIN — DICYCLOMINE HYDROCHLORIDE 10 MG: 10 CAPSULE ORAL at 20:54

## 2023-02-24 RX ADMIN — Medication 10 ML: at 20:57

## 2023-02-24 RX ADMIN — ARIPIPRAZOLE 10 MG: 10 TABLET ORAL at 20:55

## 2023-02-24 RX ADMIN — BACLOFEN 30 MG: 10 TABLET ORAL at 12:04

## 2023-02-24 RX ADMIN — BACLOFEN 30 MG: 10 TABLET ORAL at 17:25

## 2023-02-24 RX ADMIN — SACUBITRIL AND VALSARTAN 1 TABLET: 24; 26 TABLET, FILM COATED ORAL at 08:50

## 2023-02-24 RX ADMIN — GABAPENTIN 800 MG: 400 CAPSULE ORAL at 16:02

## 2023-02-24 RX ADMIN — METFORMIN HYDROCHLORIDE 1000 MG: 500 TABLET ORAL at 08:50

## 2023-02-24 RX ADMIN — INSULIN ASPART 35 UNITS: 100 INJECTION, SOLUTION INTRAVENOUS; SUBCUTANEOUS at 08:52

## 2023-02-24 RX ADMIN — SPIRONOLACTONE 25 MG: 25 TABLET ORAL at 08:51

## 2023-02-24 RX ADMIN — VITAMINS A AND D OINTMENT 1 APPLICATION: 15.5; 53.4 OINTMENT TOPICAL at 08:53

## 2023-02-24 RX ADMIN — DULOXETINE HYDROCHLORIDE 60 MG: 60 CAPSULE, DELAYED RELEASE ORAL at 20:54

## 2023-02-24 RX ADMIN — Medication 1 TABLET: at 08:52

## 2023-02-24 RX ADMIN — INSULIN ASPART 35 UNITS: 100 INJECTION, SOLUTION INTRAVENOUS; SUBCUTANEOUS at 17:25

## 2023-02-24 RX ADMIN — APIXABAN 5 MG: 5 TABLET, FILM COATED ORAL at 08:50

## 2023-02-24 RX ADMIN — INSULIN ASPART 5 UNITS: 100 INJECTION, SOLUTION INTRAVENOUS; SUBCUTANEOUS at 08:53

## 2023-02-24 RX ADMIN — VERICIGUAT 2.5 MG: 2.5 TABLET, FILM COATED ORAL at 08:55

## 2023-02-24 RX ADMIN — PANTOPRAZOLE SODIUM 40 MG: 40 TABLET, DELAYED RELEASE ORAL at 20:54

## 2023-02-24 RX ADMIN — MAGNESIUM GLUCONATE 500 MG ORAL TABLET 1200 MG: 500 TABLET ORAL at 08:51

## 2023-02-24 RX ADMIN — GABAPENTIN 800 MG: 400 CAPSULE ORAL at 08:50

## 2023-02-24 RX ADMIN — INSULIN DETEMIR 55 UNITS: 100 INJECTION, SOLUTION SUBCUTANEOUS at 20:55

## 2023-02-24 RX ADMIN — INSULIN DETEMIR 55 UNITS: 100 INJECTION, SOLUTION SUBCUTANEOUS at 08:53

## 2023-02-24 RX ADMIN — FERROUS SULFATE TAB 325 MG (65 MG ELEMENTAL FE) 325 MG: 325 (65 FE) TAB at 08:52

## 2023-02-24 NOTE — PROGRESS NOTES
PROGRESS NOTE         Patient Identification:  Name:  Ken Krueger  Age:  47 y.o.  Sex:  male  :  1975  MRN:  [id number]  Visit Number:  [id number]  Primary Care Provider:  Franchesca Hodges APRN         LOS: 3 days       ----------------------------------------------------------------------------------------------------------------------  Subjective       Chief Complaints:    No chief complaint on file.        Interval History:      Patient is sitting up in bed on room air no apparent distress.  Eating breakfast and appears comfortable.  No new issues or complaints at this time.  Denies muscle pain or arthralgias.  Nurse reports great wound healing of ulcer.  Lungs are clear to auscultation bilaterally.  Abdomen is distended, but soft, nontender, with normal bowel sounds.  Colostomy with soft stools.  Afebrile, no diarrhea.  WBC is improved at 11.11.    Review of Systems:    Constitutional: no fever, chills and night sweats.  No fatigue.  Eyes: no eye drainage, itching or redness.  HEENT: no mouth sores, dysphagia or nose bleed.  Respiratory: no for shortness of breath, cough or production of sputum.  Cardiovascular: no chest pain, no palpitations, no orthopnea.  Gastrointestinal: no nausea, vomiting or diarrhea. No abdominal pain, hematemesis or rectal bleeding.  Genitourinary: no dysuria or polyuria.  Hematologic/lymphatic: no lymph node abnormalities, no easy bruising or easy bleeding.  Musculoskeletal: No muscle or joint pain.  Skin: No rash and no itching.  Decubitus ulcers.  Neurological: no loss of consciousness, no seizure, no headache.  Behavioral/Psych: no depression or suicidal ideation.  Endocrine: no hot flashes.  Immunologic: negative.    ----------------------------------------------------------------------------------------------------------------------      Objective       Current Hospital Meds:    Pharmacy Consult - Pharmacy to dose,        ----------------------------------------------------------------------------------------------------------------------    Vital Signs:  Temp:  [97.7 °F (36.5 °C)-97.9 °F (36.6 °C)] 97.9 °F (36.6 °C)  Heart Rate:  [82-87] 82  Resp:  [18-20] 20  BP: (117-131)/(65-73) 131/73  Mean Arterial Pressure (Non-Invasive) for the past 24 hrs (Last 3 readings):   Noninvasive MAP (mmHg)   02/23/23 1900 86     SpO2 Percentage    02/22/23 1047 02/22/23 1431 02/24/23 0659   SpO2: 90% 93% 96%     SpO2:  [96 %] 96 %  on   ;   Device (Oxygen Therapy): room air    Body mass index is 42.34 kg/m².  Wt Readings from Last 3 Encounters:   02/21/23 (!) 138 kg (303 lb 9.2 oz)   02/20/23 (!) 139 kg (306 lb 14.1 oz)   12/13/22 (!) 141 kg (310 lb 14.4 oz)        Intake/Output Summary (Last 24 hours) at 2/24/2023 0924  Last data filed at 2/24/2023 0900  Gross per 24 hour   Intake 480 ml   Output 4800 ml   Net -4320 ml     Diet: Diabetic Diets; Consistent Carbohydrate; Texture: Regular Texture (IDDSI 7); Fluid Consistency: Thin (IDDSI 0)  ----------------------------------------------------------------------------------------------------------------------      Physical Exam:    Constitutional: Very pleasant obese male is sitting up in bed on room air no apparent distress.  Eating breakfast and appears comfortable.  HENT:  Head: Normocephalic and atraumatic.  Mouth:  Moist mucous membranes.    Eyes:  Conjunctivae and EOM are normal.  No scleral icterus.  Neck:  Neck supple.  No JVD present.    Cardiovascular:  Normal rate, regular rhythm and normal heart sounds with no murmur. No edema.  Pulmonary/Chest:  No respiratory distress, no wheezes, no crackles, with normal breath sounds and good air movement.  Lungs clear to auscultation bilaterally  Abdominal: Obese.  Soft.  Bowel sounds are normal.  No distension and no tenderness.  Ileal conduit and colostomy bag in place.  Musculoskeletal:  No tenderness.  No swelling or redness of joints.  Left  AKA without edema.  1+ edema of right lower extremity.  PICC to right upper arm with no signs of infection.  Neurological:  Alert and oriented to person, place, and time.  No facial droop.  No slurred speech.   Skin:  Stage IV sacral decubitus wound status post excisional debridement.  Psychiatric:  Normal mood and affect.  Behavior is normal.      ----------------------------------------------------------------------------------------------------------------------            Results from last 7 days   Lab Units 02/24/23 0132 02/21/23  0150   WBC 10*3/mm3 11.11* 12.20*   HEMOGLOBIN g/dL 11.4* 11.0*   HEMATOCRIT % 38.7 38.4   MCV fL 91.3 92.1   MCHC g/dL 29.5* 28.6*   PLATELETS 10*3/mm3 320 365     Results from last 7 days   Lab Units 02/24/23  0132 02/21/23  0150 02/20/23  0957   SODIUM mmol/L 135* 137 134*   POTASSIUM mmol/L 4.2 3.7 4.0   CHLORIDE mmol/L 97* 100 98   CO2 mmol/L 24.1 23.6 26.5   BUN mg/dL 39* 24* 24*   CREATININE mg/dL 1.01 0.97 0.80   CALCIUM mg/dL 9.9 9.4 9.3   GLUCOSE mg/dL 249* 257* 401*   Estimated Creatinine Clearance: 130.6 mL/min (by C-G formula based on SCr of 1.01 mg/dL).  No results found for: AMMONIA    Glucose   Date/Time Value Ref Range Status   02/24/2023 0616 211 (H) 70 - 130 mg/dL Final     Comment:     Meter: [id number] : [id number] NINA ASIF   02/23/2023 2111 144 (H) 70 - 130 mg/dL Final     Comment:     Meter: [id number] : [id number] MARY JANE FUSION   02/23/2023 1618 222 (H) 70 - 130 mg/dL Final     Comment:     Meter: [id number] : [id number] leti jan   02/23/2023 1123 270 (H) 70 - 130 mg/dL Final     Comment:     Meter: [id number] : [id number] leti jan   02/23/2023 0636 245 (H) 70 - 130 mg/dL Final     Comment:     Meter: [id number] : [id number] MERCEDES DELMAR   02/22/2023 2128 218 (H) 70 - 130 mg/dL Final     Comment:     Meter: [id number] : [id number] MARY JANE FUSION   02/22/2023 1625 184 (H) 70 - 130 mg/dL Final     Comment:     Meter:  [id number] : [id number] IRMA TOPETE   02/22/2023 1004 312 (H) 70 - 130 mg/dL Final     Comment:     Meter: [id number] : [id number] DEVIN GALEAS     Lab Results   Component Value Date    HGBA1C 11.50 (H) 02/13/2023     Lab Results   Component Value Date    TSH 3.780 07/19/2022    FREET4 1.31 06/02/2021       Blood Culture   Date Value Ref Range Status   02/12/2023 No growth at 24 hours  Preliminary   02/12/2023 No growth at 24 hours  Preliminary     No results found for: URINECX  No results found for: WOUNDCX  No results found for: STOOLCX  No results found for: RESPCX  Pain Management Panel       Pain Management Panel Latest Ref Rng & Units 6/2/2021 8/19/2018    AMPHETAMINES SCREEN, URINE Negative Negative Negative    BARBITURATES SCREEN Negative Negative Negative    BENZODIAZEPINE SCREEN, URINE Negative Negative Negative    BUPRENORPHINEUR Negative Negative Negative    COCAINE SCREEN, URINE Negative Negative Negative    METHADONE SCREEN, URINE Negative Negative Negative              ----------------------------------------------------------------------------------------------------------------------  Imaging Results (Last 24 Hours)       ** No results found for the last 24 hours. **            -----------------------------------------------------------------------------------  Pertinent Infectious Disease Results     CT abdomen pelvis with contrast on 2/12/2023 showed there is a new decubitus ulcer just to the right of midline at the level of the coccyx with constellation of imaging findings most characteristic of acute infection/osteomyelitis. Small pocket of air and fluid adjacent to the coccyx measuring 3.7 x 4.1 cm could reflect phlegmon or abscess. Chronic bilateral decubitus ulcers at the level of the ischial tuberosities bilaterally with imaging findings suggestive of chronic infection/osteomyelitis, similar to prior. Hepatic steatosis and hepatomegaly with borderline splenomegaly.  Nonobstructing left renal stones. Postoperative changes of left lower quadrant and colostomy and right mid abdominal ileal conduit formation. Diverticulosis. No diverticulitis or bowel obstruction. COVID-19 and flu A/B PCR on 2/12/2023 was negative.    Status post excisional debridement of sacral ulcer on 2/13/2023 with Dr. Taylor.         Assessment/Plan         ASSESSMENT:    Septic shock with lactic acid greater than 4 on admission  Acute and chronic decubitus ulcerations with osteomyelitis and possible abscess      PLAN:    I have seen and examined the patient myself this morning and discussed the case with Christy Caro PA-C and discussed overnight changes with primary RN here are my findings:    The patient is currently sitting up in bed and appears to be comfortable while eating breakfast. No new issues or complaints have been reported. The patient denies experiencing any muscle pain or arthralgias. According to the nurse, the ulcer has been healing well.    The patient's lungs are clear to auscultation bilaterally, indicating that there are no signs of respiratory distress. The abdomen is distended, but soft and nontender, with normal bowel sounds. The patient has a colostomy and is passing soft stools without diarrhea.    The patient is afebrile and their WBC count has improved to 11.11. It is recommended to continue the patient on Rocephin through 3/29/2023 for osteomyelitis.    Code Status:   Code Status and Medical Interventions:   Ordered at: 02/21/23 1034     Code Status (Patient has no pulse and is not breathing):    CPR (Attempt to Resuscitate)     Medical Interventions (Patient has pulse or is breathing):    Full Support       Christy Caro PA-C  02/24/23  09:24 EST    Electronically signed by Christy Caro PA-C, 02/24/23, 9:26 AM EST.    Electronically signed by Tra Jain MD, 02/24/23, 9:40 AM EST.

## 2023-02-24 NOTE — PROGRESS NOTES
Antimicrobial Length of Therapy:    Rocephin day 10, will continue until 2/29.     Thank you,   Esther Vick, Pharm.D.   Pharmacy Resident   2/24/2023  12:00 EST

## 2023-02-24 NOTE — PLAN OF CARE
Goal Outcome Evaluation:  Plan of Care Reviewed With: patient        Progress: no change  Outcome Evaluation: Pt's resting in bed, A&O. No complaints or acute changes noted at this time. Wound care done per order. Con't IV ABX/wound care/BS control. Will continue to follow plan of care.

## 2023-02-24 NOTE — PLAN OF CARE
Goal Outcome Evaluation:              Outcome Evaluation: Patient has rested in bed this shift, Wound Dressings CDI, no complaints or request at this time, VSS, Will continue plan of care.

## 2023-02-24 NOTE — CASE MANAGEMENT/SOCIAL WORK
Discharge Planning [facility]     Patient Name: Ken Krueger  MRN: [id number]  Today's Date: 2/24/2023    Admit Date: 2/21/2023    Plan: Pt was admitted to swing bed on 2/21/23. Pt lives with his mother and brother and he plans to return home at discharge ([address] [place], [place] in [facility]). Pt utilized [facility] for home health services prior to admission. [facility] [phone] to be contacted at discharge. Pt has a hospital bed, patricio lift, trapeze bar, and wheelchair via [facility] and an electric wheelchair. PCP is Franchesca Hodges. POA is Pineda álvarez. Pt does not have a living will on file. SS to follow.   Discharge Plan     Row Name 02/24/23 1141       Plan    Plan Pt was admitted to swing bed on 2/21/23. Pt lives with his mother and brother and he plans to return home at discharge ([address] [place], [place] in [facility]). Pt utilized [facility] for home health services prior to admission. [facility] [phone] to be contacted at discharge. Pt has a hospital bed, patricio lift, trapeze bar, and wheelchair via [facility] and an electric wheelchair. PCP is Franchesca Hodges. POA is Pineda álvarez. Pt does not have a living will on file. SS to follow.                HUMPHREY Loya

## 2023-02-25 LAB
GLUCOSE BLDC GLUCOMTR-MCNC: 136 MG/DL (ref 70–130)
GLUCOSE BLDC GLUCOMTR-MCNC: 162 MG/DL (ref 70–130)
GLUCOSE BLDC GLUCOMTR-MCNC: 213 MG/DL (ref 70–130)
GLUCOSE BLDC GLUCOMTR-MCNC: 242 MG/DL (ref 70–130)

## 2023-02-25 PROCEDURE — 82962 GLUCOSE BLOOD TEST: CPT

## 2023-02-25 PROCEDURE — 63710000001 INSULIN ASPART PER 5 UNITS: Performed by: STUDENT IN AN ORGANIZED HEALTH CARE EDUCATION/TRAINING PROGRAM

## 2023-02-25 PROCEDURE — 99308 SBSQ NF CARE LOW MDM 20: CPT | Performed by: PHYSICIAN ASSISTANT

## 2023-02-25 PROCEDURE — 63710000001 INSULIN DETEMIR PER 5 UNITS: Performed by: STUDENT IN AN ORGANIZED HEALTH CARE EDUCATION/TRAINING PROGRAM

## 2023-02-25 PROCEDURE — 25010000002 CEFTRIAXONE PER 250 MG: Performed by: STUDENT IN AN ORGANIZED HEALTH CARE EDUCATION/TRAINING PROGRAM

## 2023-02-25 RX ORDER — INSULIN ASPART 100 [IU]/ML
30 INJECTION, SOLUTION INTRAVENOUS; SUBCUTANEOUS
Status: DISCONTINUED | OUTPATIENT
Start: 2023-02-26 | End: 2023-03-07

## 2023-02-25 RX ORDER — GLIPIZIDE 5 MG/1
5 TABLET ORAL
Status: DISCONTINUED | OUTPATIENT
Start: 2023-02-26 | End: 2023-03-07

## 2023-02-25 RX ADMIN — NYSTATIN: 100000 POWDER TOPICAL at 20:24

## 2023-02-25 RX ADMIN — Medication 10 ML: at 08:11

## 2023-02-25 RX ADMIN — DULOXETINE HYDROCHLORIDE 60 MG: 60 CAPSULE, DELAYED RELEASE ORAL at 20:24

## 2023-02-25 RX ADMIN — EMPAGLIFLOZIN 10 MG: 10 TABLET, FILM COATED ORAL at 08:10

## 2023-02-25 RX ADMIN — BACLOFEN 30 MG: 10 TABLET ORAL at 12:22

## 2023-02-25 RX ADMIN — MODAFINIL 200 MG: 100 TABLET ORAL at 08:07

## 2023-02-25 RX ADMIN — Medication 1 CAPSULE: at 08:11

## 2023-02-25 RX ADMIN — APIXABAN 5 MG: 5 TABLET, FILM COATED ORAL at 20:24

## 2023-02-25 RX ADMIN — GABAPENTIN 800 MG: 400 CAPSULE ORAL at 17:29

## 2023-02-25 RX ADMIN — BACLOFEN 30 MG: 10 TABLET ORAL at 17:28

## 2023-02-25 RX ADMIN — Medication 1000 UNITS: at 08:10

## 2023-02-25 RX ADMIN — CARVEDILOL 12.5 MG: 6.25 TABLET, FILM COATED ORAL at 08:10

## 2023-02-25 RX ADMIN — GLIPIZIDE 5 MG: 5 TABLET ORAL at 08:10

## 2023-02-25 RX ADMIN — SUCRALFATE 1 G: 1 TABLET ORAL at 08:10

## 2023-02-25 RX ADMIN — APIXABAN 5 MG: 5 TABLET, FILM COATED ORAL at 08:10

## 2023-02-25 RX ADMIN — METFORMIN HYDROCHLORIDE 1000 MG: 500 TABLET ORAL at 08:11

## 2023-02-25 RX ADMIN — VITAMINS A AND D OINTMENT 1 APPLICATION: 15.5; 53.4 OINTMENT TOPICAL at 09:16

## 2023-02-25 RX ADMIN — VITAMINS A AND D OINTMENT 1 APPLICATION: 15.5; 53.4 OINTMENT TOPICAL at 20:24

## 2023-02-25 RX ADMIN — METFORMIN HYDROCHLORIDE 1000 MG: 500 TABLET ORAL at 17:28

## 2023-02-25 RX ADMIN — GABAPENTIN 800 MG: 400 CAPSULE ORAL at 20:23

## 2023-02-25 RX ADMIN — DICYCLOMINE HYDROCHLORIDE 10 MG: 10 CAPSULE ORAL at 20:24

## 2023-02-25 RX ADMIN — ARIPIPRAZOLE 10 MG: 10 TABLET ORAL at 20:23

## 2023-02-25 RX ADMIN — CEFTRIAXONE 2 G: 2 INJECTION, POWDER, FOR SOLUTION INTRAMUSCULAR; INTRAVENOUS at 08:06

## 2023-02-25 RX ADMIN — INSULIN ASPART 35 UNITS: 100 INJECTION, SOLUTION INTRAVENOUS; SUBCUTANEOUS at 08:06

## 2023-02-25 RX ADMIN — VERICIGUAT 2.5 MG: 2.5 TABLET, FILM COATED ORAL at 08:06

## 2023-02-25 RX ADMIN — Medication 1 TABLET: at 08:11

## 2023-02-25 RX ADMIN — SACUBITRIL AND VALSARTAN 1 TABLET: 24; 26 TABLET, FILM COATED ORAL at 08:12

## 2023-02-25 RX ADMIN — SPIRONOLACTONE 25 MG: 25 TABLET ORAL at 08:07

## 2023-02-25 RX ADMIN — GABAPENTIN 800 MG: 400 CAPSULE ORAL at 08:12

## 2023-02-25 RX ADMIN — BACLOFEN 30 MG: 10 TABLET ORAL at 08:10

## 2023-02-25 RX ADMIN — Medication 10 ML: at 20:24

## 2023-02-25 RX ADMIN — INSULIN DETEMIR 55 UNITS: 100 INJECTION, SOLUTION SUBCUTANEOUS at 08:06

## 2023-02-25 RX ADMIN — BUMETANIDE 2 MG: 1 TABLET ORAL at 08:10

## 2023-02-25 RX ADMIN — MAGNESIUM GLUCONATE 500 MG ORAL TABLET 1200 MG: 500 TABLET ORAL at 08:10

## 2023-02-25 RX ADMIN — BACLOFEN 30 MG: 10 TABLET ORAL at 20:23

## 2023-02-25 RX ADMIN — INSULIN ASPART 3 UNITS: 100 INJECTION, SOLUTION INTRAVENOUS; SUBCUTANEOUS at 18:00

## 2023-02-25 RX ADMIN — INSULIN ASPART 35 UNITS: 100 INJECTION, SOLUTION INTRAVENOUS; SUBCUTANEOUS at 17:59

## 2023-02-25 RX ADMIN — INSULIN ASPART 5 UNITS: 100 INJECTION, SOLUTION INTRAVENOUS; SUBCUTANEOUS at 08:12

## 2023-02-25 RX ADMIN — DULOXETINE HYDROCHLORIDE 60 MG: 60 CAPSULE, DELAYED RELEASE ORAL at 08:10

## 2023-02-25 RX ADMIN — DICYCLOMINE HYDROCHLORIDE 10 MG: 10 CAPSULE ORAL at 08:11

## 2023-02-25 RX ADMIN — INSULIN ASPART 35 UNITS: 100 INJECTION, SOLUTION INTRAVENOUS; SUBCUTANEOUS at 12:22

## 2023-02-25 RX ADMIN — INSULIN ASPART 5 UNITS: 100 INJECTION, SOLUTION INTRAVENOUS; SUBCUTANEOUS at 12:22

## 2023-02-25 RX ADMIN — NYSTATIN: 100000 POWDER TOPICAL at 09:16

## 2023-02-25 RX ADMIN — FERROUS SULFATE TAB 325 MG (65 MG ELEMENTAL FE) 325 MG: 325 (65 FE) TAB at 08:07

## 2023-02-25 RX ADMIN — SACUBITRIL AND VALSARTAN 1 TABLET: 24; 26 TABLET, FILM COATED ORAL at 20:23

## 2023-02-25 RX ADMIN — INSULIN DETEMIR 55 UNITS: 100 INJECTION, SOLUTION SUBCUTANEOUS at 20:23

## 2023-02-25 RX ADMIN — PANTOPRAZOLE SODIUM 40 MG: 40 TABLET, DELAYED RELEASE ORAL at 08:10

## 2023-02-25 RX ADMIN — ATORVASTATIN CALCIUM 10 MG: 10 TABLET, FILM COATED ORAL at 20:23

## 2023-02-25 RX ADMIN — CARVEDILOL 12.5 MG: 6.25 TABLET, FILM COATED ORAL at 17:28

## 2023-02-25 RX ADMIN — PANTOPRAZOLE SODIUM 40 MG: 40 TABLET, DELAYED RELEASE ORAL at 20:24

## 2023-02-25 RX ADMIN — OXYCODONE HYDROCHLORIDE AND ACETAMINOPHEN 500 MG: 500 TABLET ORAL at 08:10

## 2023-02-25 NOTE — PLAN OF CARE
Goal Outcome Evaluation:  Plan of Care Reviewed With: patient           Outcome Evaluation: Patient is resting in bed, VSS on RA. No acute changes or complaints, will continue POC

## 2023-02-25 NOTE — PLAN OF CARE
Goal Outcome Evaluation:  Plan of Care Reviewed With: patient        Progress: improving       Wound care completed, pt tolerated well. No acute changes, will continue to follow current POC.

## 2023-02-25 NOTE — PROGRESS NOTES
Patient not seen and examined at bedside today.  Patient admitted to [facility] bed on 2/21/2023.  Patient chart and vitals and labs reviewed and remained stable, will repeat labs again tomorrow to continue to monitor renal function while receiving long-term IV antibiotic therapy.  Blood glucose improved and insulin regimen adjusted further with increasing glipizide and decrease in insulin.  Patient currently receiving monotherapy with rocephin With planned course of treatment through 3/29/2023. Patient previously seen and evaluated by PT and OT and plan for discharge to return home with 24/7 assist in care as patient was previously on at home.  Continue wound care and antibiotic therapy.

## 2023-02-25 NOTE — PROGRESS NOTES
PROGRESS NOTE         Patient Identification:  Name:  Ken Krueger  Age:  47 y.o.  Sex:  male  :  1975  MRN:  [id number]  Visit Number:  [id number]  Primary Care Provider:  Franchesca Hodges APRN         LOS: 4 days       ----------------------------------------------------------------------------------------------------------------------  Subjective       Chief Complaints:    No chief complaint on file.        Interval History:      Patient is resting in bed on CPAP in no apparent distress.  Awakens easily and denies any new complaints at this time.  Lungs are clear to auscultation bilaterally.  Abdomen is distended, but soft, nontender, normal bowel sounds.  Afebrile, no increased output from ostomy.  No new labs today.  No new imaging today.    Review of Systems:    Constitutional: no fever, chills and night sweats.  No fatigue.  Eyes: no eye drainage, itching or redness.  HEENT: no mouth sores, dysphagia or nose bleed.  Respiratory: no for shortness of breath, cough or production of sputum.  Cardiovascular: no chest pain, no palpitations, no orthopnea.  Gastrointestinal: no nausea, vomiting or diarrhea. No abdominal pain, hematemesis or rectal bleeding.  Genitourinary: no dysuria or polyuria.  Hematologic/lymphatic: no lymph node abnormalities, no easy bruising or easy bleeding.  Musculoskeletal: No muscle or joint pain.  Skin: No rash and no itching.  Decubitus ulcers.  Neurological: no loss of consciousness, no seizure, no headache.  Behavioral/Psych: no depression or suicidal ideation.  Endocrine: no hot flashes.  Immunologic: negative.    ----------------------------------------------------------------------------------------------------------------------      Objective       Current Hospital Meds:    Pharmacy Consult - Pharmacy to dose,       ----------------------------------------------------------------------------------------------------------------------    Vital Signs:  Temp:  [97.3  °F (36.3 °C)-97.9 °F (36.6 °C)] 97.3 °F (36.3 °C)  Heart Rate:  [80-92] 80  Resp:  [18] 18  BP: (114-137)/(75-79) 137/79  No data found.  SpO2 Percentage    02/22/23 1431 02/24/23 0659 02/24/23 1857   SpO2: 93% 96% 96%     SpO2:  [96 %] 96 %  on   ;   Device (Oxygen Therapy): room air;NPPV/NIV    Body mass index is 42.34 kg/m².  Wt Readings from Last 3 Encounters:   02/21/23 (!) 138 kg (303 lb 9.2 oz)   02/20/23 (!) 139 kg (306 lb 14.1 oz)   12/13/22 (!) 141 kg (310 lb 14.4 oz)        Intake/Output Summary (Last 24 hours) at 2/25/2023 0718  Last data filed at 2/25/2023 0500  Gross per 24 hour   Intake 1660 ml   Output 5100 ml   Net -3440 ml     Diet: Diabetic Diets; Consistent Carbohydrate; Texture: Regular Texture (IDDSI 7); Fluid Consistency: Thin (IDDSI 0)  ----------------------------------------------------------------------------------------------------------------------      Physical Exam:    Constitutional: Very pleasant obese male is resting in bed on CPAP in no apparent distress.  Awakens easily.  HENT:  Head: Normocephalic and atraumatic.  Mouth:  Moist mucous membranes.    Eyes:  Conjunctivae and EOM are normal.  No scleral icterus.  Neck:  Neck supple.  No JVD present.    Cardiovascular:  Normal rate, regular rhythm and normal heart sounds with no murmur. No edema.  Pulmonary/Chest:  No respiratory distress, no wheezes, no crackles, with normal breath sounds and good air movement.  Lungs clear to auscultation bilaterally  Abdominal: Obese.  Soft.  Bowel sounds are normal.  No distension and no tenderness.  Ileal conduit and colostomy bag in place.  Musculoskeletal:  No tenderness.  No swelling or redness of joints.  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  1+ edema of right lower extremity.  PICC to right upper arm with no signs of infection.  Neurological:  Alert and oriented to person, place, and time.  No facial droop.  No slurred speech.   Skin:  Stage IV sacral decubitus wound status post excisional  debridement.  Psychiatric:  Normal mood and affect.  Behavior is normal.      ----------------------------------------------------------------------------------------------------------------------            Results from last 7 days   Lab Units 02/24/23 0132 02/21/23  0150   WBC 10*3/mm3 11.11* 12.20*   HEMOGLOBIN g/dL 11.4* 11.0*   HEMATOCRIT % 38.7 38.4   MCV fL 91.3 92.1   MCHC g/dL 29.5* 28.6*   PLATELETS 10*3/mm3 320 365     Results from last 7 days   Lab Units 02/24/23 0132 02/21/23 0150 02/20/23  0957   SODIUM mmol/L 135* 137 134*   POTASSIUM mmol/L 4.2 3.7 4.0   CHLORIDE mmol/L 97* 100 98   CO2 mmol/L 24.1 23.6 26.5   BUN mg/dL 39* 24* 24*   CREATININE mg/dL 1.01 0.97 0.80   CALCIUM mg/dL 9.9 9.4 9.3   GLUCOSE mg/dL 249* 257* 401*   Estimated Creatinine Clearance: 130.6 mL/min (by C-G formula based on SCr of 1.01 mg/dL).  No results found for: AMMONIA    Glucose   Date/Time Value Ref Range Status   02/25/2023 0615 242 (H) 70 - 130 mg/dL Final     Comment:     Meter: [id number] : [id number] norbert meza   02/24/2023 1934 187 (H) 70 - 130 mg/dL Final     Comment:     Meter: [id number] : [id number] Noemi NICHOLE   02/24/2023 1607 200 (H) 70 - 130 mg/dL Final     Comment:     Meter: [id number] : [id number] leti montoya   02/24/2023 1116 273 (H) 70 - 130 mg/dL Final     Comment:     Meter: [id number] : [id number] leti jan   02/24/2023 0616 211 (H) 70 - 130 mg/dL Final     Comment:     Meter: [id number] : [id number] NINA ASIF   02/23/2023 2111 144 (H) 70 - 130 mg/dL Final     Comment:     Meter: [id number] : [id number] MARY JANE FUSION   02/23/2023 1618 222 (H) 70 - 130 mg/dL Final     Comment:     Meter: [id number] : [id number] leti jan   02/23/2023 1123 270 (H) 70 - 130 mg/dL Final     Comment:     Meter: [id number] : [id number] leti jan     Lab Results   Component Value Date    HGBA1C 11.50 (H) 02/13/2023     Lab Results   Component Value Date     TSH 3.780 07/19/2022    FREET4 1.31 06/02/2021       Blood Culture   Date Value Ref Range Status   02/12/2023 No growth at 24 hours  Preliminary   02/12/2023 No growth at 24 hours  Preliminary     No results found for: URINECX  No results found for: WOUNDCX  No results found for: STOOLCX  No results found for: RESPCX  Pain Management Panel       Pain Management Panel Latest Ref Rng & Units 6/2/2021 8/19/2018    AMPHETAMINES SCREEN, URINE Negative Negative Negative    BARBITURATES SCREEN Negative Negative Negative    BENZODIAZEPINE SCREEN, URINE Negative Negative Negative    BUPRENORPHINEUR Negative Negative Negative    COCAINE SCREEN, URINE Negative Negative Negative    METHADONE SCREEN, URINE Negative Negative Negative              ----------------------------------------------------------------------------------------------------------------------  Imaging Results (Last 24 Hours)       ** No results found for the last 24 hours. **            -----------------------------------------------------------------------------------  Pertinent Infectious Disease Results     CT abdomen pelvis with contrast on 2/12/2023 showed there is a new decubitus ulcer just to the right of midline at the level of the coccyx with constellation of imaging findings most characteristic of acute infection/osteomyelitis. Small pocket of air and fluid adjacent to the coccyx measuring 3.7 x 4.1 cm could reflect phlegmon or abscess. Chronic bilateral decubitus ulcers at the level of the ischial tuberosities bilaterally with imaging findings suggestive of chronic infection/osteomyelitis, similar to prior. Hepatic steatosis and hepatomegaly with borderline splenomegaly. Nonobstructing left renal stones. Postoperative changes of left lower quadrant and colostomy and right mid abdominal ileal conduit formation. Diverticulosis. No diverticulitis or bowel obstruction. COVID-19 and flu A/B PCR on 2/12/2023 was negative.    Status post excisional  debridement of sacral ulcer on 2/13/2023 with Dr. Taylor.     Assessment/Plan         ASSESSMENT:    Septic shock with lactic acid greater than 4 on admission  Acute and chronic decubitus ulcerations with osteomyelitis and possible abscess      PLAN:    I examined the patient this morning and he is resting in bed on CPAP in no apparent distress.  Awakens easily and denies any new complaints at this time.  Lungs are clear to auscultation bilaterally.  Abdomen is distended, but soft, nontender, normal bowel sounds.  Remains afebrile with no increased output from ostomy.  No new labs today.  No new imaging today.    Recommend to continue on Rocephin through 3/29/2023 for osteomyelitis, as tissue cultures finalized as E. coli.    Code Status:   Code Status and Medical Interventions:   Ordered at: 02/21/23 1034     Code Status (Patient has no pulse and is not breathing):    CPR (Attempt to Resuscitate)     Medical Interventions (Patient has pulse or is breathing):    Full Support       Christy Caro PA-C  02/25/23  07:18 EST

## 2023-02-25 NOTE — PROGRESS NOTES
Patient not seen and examined at bedside today.  Patient admitted to swing bed on 2/21/2023.  Patient chart and vitals and labs reviewed and remained stable.  Patient currently receiving monotherapy with rocephin With planned course of treatment through 3/29/2023. Patient previously seen and evaluated by PT and OT and plan for discharge to return home with 24/7 assist in care as patient was previously on at home.  Continue wound care and antibiotic therapy.

## 2023-02-26 LAB
GLUCOSE BLDC GLUCOMTR-MCNC: 139 MG/DL (ref 70–130)
GLUCOSE BLDC GLUCOMTR-MCNC: 150 MG/DL (ref 70–130)
GLUCOSE BLDC GLUCOMTR-MCNC: 181 MG/DL (ref 70–130)
GLUCOSE BLDC GLUCOMTR-MCNC: 191 MG/DL (ref 70–130)

## 2023-02-26 PROCEDURE — 99308 SBSQ NF CARE LOW MDM 20: CPT | Performed by: PHYSICIAN ASSISTANT

## 2023-02-26 PROCEDURE — 25010000002 CEFTRIAXONE PER 250 MG: Performed by: STUDENT IN AN ORGANIZED HEALTH CARE EDUCATION/TRAINING PROGRAM

## 2023-02-26 PROCEDURE — 63710000001 INSULIN DETEMIR PER 5 UNITS: Performed by: STUDENT IN AN ORGANIZED HEALTH CARE EDUCATION/TRAINING PROGRAM

## 2023-02-26 PROCEDURE — 63710000001 INSULIN ASPART PER 5 UNITS: Performed by: STUDENT IN AN ORGANIZED HEALTH CARE EDUCATION/TRAINING PROGRAM

## 2023-02-26 PROCEDURE — 82962 GLUCOSE BLOOD TEST: CPT

## 2023-02-26 RX ORDER — INSULIN ASPART 100 [IU]/ML
0-9 INJECTION, SOLUTION INTRAVENOUS; SUBCUTANEOUS
Status: DISCONTINUED | OUTPATIENT
Start: 2023-02-26 | End: 2023-03-06

## 2023-02-26 RX ADMIN — PANTOPRAZOLE SODIUM 40 MG: 40 TABLET, DELAYED RELEASE ORAL at 08:27

## 2023-02-26 RX ADMIN — Medication 10 ML: at 21:34

## 2023-02-26 RX ADMIN — Medication 10 ML: at 08:43

## 2023-02-26 RX ADMIN — Medication 1 CAPSULE: at 08:28

## 2023-02-26 RX ADMIN — DICYCLOMINE HYDROCHLORIDE 10 MG: 10 CAPSULE ORAL at 08:28

## 2023-02-26 RX ADMIN — MODAFINIL 200 MG: 100 TABLET ORAL at 08:28

## 2023-02-26 RX ADMIN — INSULIN DETEMIR 55 UNITS: 100 INJECTION, SOLUTION SUBCUTANEOUS at 08:26

## 2023-02-26 RX ADMIN — GABAPENTIN 800 MG: 400 CAPSULE ORAL at 08:28

## 2023-02-26 RX ADMIN — SUCRALFATE 1 G: 1 TABLET ORAL at 08:27

## 2023-02-26 RX ADMIN — GLIPIZIDE 5 MG: 5 TABLET ORAL at 17:00

## 2023-02-26 RX ADMIN — CEFTRIAXONE 2 G: 2 INJECTION, POWDER, FOR SOLUTION INTRAMUSCULAR; INTRAVENOUS at 08:43

## 2023-02-26 RX ADMIN — Medication 1000 UNITS: at 08:27

## 2023-02-26 RX ADMIN — GABAPENTIN 800 MG: 400 CAPSULE ORAL at 21:33

## 2023-02-26 RX ADMIN — OXYCODONE HYDROCHLORIDE AND ACETAMINOPHEN 500 MG: 500 TABLET ORAL at 08:27

## 2023-02-26 RX ADMIN — VITAMINS A AND D OINTMENT 1 APPLICATION: 15.5; 53.4 OINTMENT TOPICAL at 21:34

## 2023-02-26 RX ADMIN — INSULIN DETEMIR 55 UNITS: 100 INJECTION, SOLUTION SUBCUTANEOUS at 21:32

## 2023-02-26 RX ADMIN — MAGNESIUM GLUCONATE 500 MG ORAL TABLET 1200 MG: 500 TABLET ORAL at 08:27

## 2023-02-26 RX ADMIN — EMPAGLIFLOZIN 10 MG: 10 TABLET, FILM COATED ORAL at 08:27

## 2023-02-26 RX ADMIN — ARIPIPRAZOLE 10 MG: 10 TABLET ORAL at 21:32

## 2023-02-26 RX ADMIN — INSULIN ASPART 30 UNITS: 100 INJECTION, SOLUTION INTRAVENOUS; SUBCUTANEOUS at 08:26

## 2023-02-26 RX ADMIN — APIXABAN 5 MG: 5 TABLET, FILM COATED ORAL at 21:33

## 2023-02-26 RX ADMIN — FERROUS SULFATE TAB 325 MG (65 MG ELEMENTAL FE) 325 MG: 325 (65 FE) TAB at 08:28

## 2023-02-26 RX ADMIN — VITAMINS A AND D OINTMENT 1 APPLICATION: 15.5; 53.4 OINTMENT TOPICAL at 08:43

## 2023-02-26 RX ADMIN — METFORMIN HYDROCHLORIDE 1000 MG: 500 TABLET ORAL at 08:27

## 2023-02-26 RX ADMIN — Medication 1 TABLET: at 08:28

## 2023-02-26 RX ADMIN — DULOXETINE HYDROCHLORIDE 60 MG: 60 CAPSULE, DELAYED RELEASE ORAL at 08:27

## 2023-02-26 RX ADMIN — BUMETANIDE 2 MG: 1 TABLET ORAL at 08:28

## 2023-02-26 RX ADMIN — GABAPENTIN 800 MG: 400 CAPSULE ORAL at 17:01

## 2023-02-26 RX ADMIN — BACLOFEN 30 MG: 10 TABLET ORAL at 21:39

## 2023-02-26 RX ADMIN — DULOXETINE HYDROCHLORIDE 60 MG: 60 CAPSULE, DELAYED RELEASE ORAL at 21:32

## 2023-02-26 RX ADMIN — METFORMIN HYDROCHLORIDE 1000 MG: 500 TABLET ORAL at 17:04

## 2023-02-26 RX ADMIN — APIXABAN 5 MG: 5 TABLET, FILM COATED ORAL at 08:28

## 2023-02-26 RX ADMIN — INSULIN ASPART 2 UNITS: 100 INJECTION, SOLUTION INTRAVENOUS; SUBCUTANEOUS at 12:14

## 2023-02-26 RX ADMIN — INSULIN ASPART 30 UNITS: 100 INJECTION, SOLUTION INTRAVENOUS; SUBCUTANEOUS at 16:59

## 2023-02-26 RX ADMIN — SACUBITRIL AND VALSARTAN 1 TABLET: 24; 26 TABLET, FILM COATED ORAL at 21:32

## 2023-02-26 RX ADMIN — DICYCLOMINE HYDROCHLORIDE 10 MG: 10 CAPSULE ORAL at 21:33

## 2023-02-26 RX ADMIN — PANTOPRAZOLE SODIUM 40 MG: 40 TABLET, DELAYED RELEASE ORAL at 21:32

## 2023-02-26 RX ADMIN — CARVEDILOL 12.5 MG: 6.25 TABLET, FILM COATED ORAL at 17:04

## 2023-02-26 RX ADMIN — GLIPIZIDE 5 MG: 5 TABLET ORAL at 08:28

## 2023-02-26 RX ADMIN — BACLOFEN 30 MG: 10 TABLET ORAL at 08:44

## 2023-02-26 RX ADMIN — BACLOFEN 30 MG: 10 TABLET ORAL at 12:14

## 2023-02-26 RX ADMIN — INSULIN ASPART 30 UNITS: 100 INJECTION, SOLUTION INTRAVENOUS; SUBCUTANEOUS at 12:14

## 2023-02-26 RX ADMIN — NYSTATIN: 100000 POWDER TOPICAL at 08:43

## 2023-02-26 RX ADMIN — VERICIGUAT 2.5 MG: 2.5 TABLET, FILM COATED ORAL at 08:26

## 2023-02-26 RX ADMIN — INSULIN ASPART 2 UNITS: 100 INJECTION, SOLUTION INTRAVENOUS; SUBCUTANEOUS at 08:26

## 2023-02-26 RX ADMIN — BACLOFEN 30 MG: 10 TABLET ORAL at 17:01

## 2023-02-26 RX ADMIN — ATORVASTATIN CALCIUM 10 MG: 10 TABLET, FILM COATED ORAL at 21:33

## 2023-02-26 RX ADMIN — NYSTATIN: 100000 POWDER TOPICAL at 21:34

## 2023-02-26 NOTE — PLAN OF CARE
Goal Outcome Evaluation:  Plan of Care Reviewed With: patient           Outcome Evaluation: Patient is resting in bed, wound care complete per orders. No acute changes or complaints

## 2023-02-26 NOTE — PROGRESS NOTES
Patient not seen and examined at bedside today.  Patient admitted to [facility] bed on 2/21/2023.  Patient chart and vitals and labs reviewed and remained stable, will repeat labs tomorrow to continue to monitor renal function while receiving long-term IV antibiotic therapy.  Blood glucose improved and insulin regimen adjusted further with increasing glipizide and decrease in insulin.  Patient currently receiving monotherapy with rocephin With planned course of treatment through 3/29/2023. Patient previously seen and evaluated by PT and OT and plan for discharge to return home with 24/7 assist in care as patient was previously on at home.  Continue wound care and antibiotic therapy.

## 2023-02-26 NOTE — PLAN OF CARE
Goal Outcome Evaluation:  Plan of Care Reviewed With: patient        Progress: improving  Outcome Evaluation: Patient currently resting in bed. Wound care done per order. No acute changes at this time.

## 2023-02-26 NOTE — PROGRESS NOTES
PROGRESS NOTE         Patient Identification:  Name:  Ken Krueger  Age:  47 y.o.  Sex:  male  :  1975  MRN:  [id number]  Visit Number:  [id number]  Primary Care Provider:  Franchesca Hodges APRN         LOS: 5 days       ----------------------------------------------------------------------------------------------------------------------  Subjective       Chief Complaints:    No chief complaint on file.        Interval History:      The patient is sitting up in bed on room air in no apparent distress.  Eating breakfast and appears comfortable.  Denies any particular complaints at this time, but has an occasional cough without production of sputum.  Nurse is at bedside and reports no new issues.  Afebrile, no increased output from ostomy.  No new labs today.    Review of Systems:    Constitutional: no fever, chills and night sweats.  No fatigue.  Eyes: no eye drainage, itching or redness.  HEENT: no mouth sores, dysphagia or nose bleed.  Respiratory: no for shortness of breath.  Dry cough.  Cardiovascular: no chest pain, no palpitations, no orthopnea.  Gastrointestinal: no nausea, vomiting or diarrhea. No abdominal pain, hematemesis or rectal bleeding.  Genitourinary: no dysuria or polyuria.  Hematologic/lymphatic: no lymph node abnormalities, no easy bruising or easy bleeding.  Musculoskeletal: No muscle or joint pain.  Skin: No rash and no itching.  Decubitus ulcers.  Neurological: no loss of consciousness, no seizure, no headache.  Behavioral/Psych: no depression or suicidal ideation.  Endocrine: no hot flashes.  Immunologic: negative.    ----------------------------------------------------------------------------------------------------------------------      Objective       [facility] Hospital Meds:    Pharmacy Consult - Pharmacy to dose,       ----------------------------------------------------------------------------------------------------------------------    Vital Signs:  Temp:  [97.2 °F  (36.2 °C)-98 °F (36.7 °C)] 97.7 °F (36.5 °C)  Heart Rate:  [77-86] 78  Resp:  [18-20] 20  BP: ()/(53-72) 101/62  No data found.  SpO2 Percentage    02/24/23 1857 02/25/23 1900 02/26/23 0658   SpO2: 96% 98% 97%     SpO2:  [97 %-98 %] 97 %  on   ;   Device (Oxygen Therapy): room air    Body mass index is 42.34 kg/m².  Wt Readings from Last 3 Encounters:   02/21/23 (!) 138 kg (303 lb 9.2 oz)   02/20/23 (!) 139 kg (306 lb 14.1 oz)   12/13/22 (!) 141 kg (310 lb 14.4 oz)        Intake/Output Summary (Last 24 hours) at 2/26/2023 0902  Last data filed at 2/26/2023 0822  Gross per 24 hour   Intake 2490 ml   Output 3350 ml   Net -860 ml     Diet: Diabetic Diets; Consistent Carbohydrate; Texture: Regular Texture (IDDSI 7); Fluid Consistency: Thin (IDDSI 0)  ----------------------------------------------------------------------------------------------------------------------      Physical Exam:    Constitutional: Very pleasant obese male is sitting up in bed on room air in no apparent distress.  Eating breakfast and appears comfortable.  Nurses at bedside.  HENT:  Head: Normocephalic and atraumatic.  Mouth:  Moist mucous membranes.    Eyes:  Conjunctivae and EOM are normal.  No scleral icterus.  Neck:  Neck supple.  No JVD present.    Cardiovascular:  Normal rate, regular rhythm and normal heart sounds with no murmur. No edema.  Pulmonary/Chest:  No respiratory distress, no wheezes, no crackles, with normal breath sounds and good air movement.  Lungs clear to auscultation bilaterally  Abdominal: Obese.  Soft.  Bowel sounds are normal.  No distension and no tenderness.  Ileal conduit and colostomy bag in place.  Musculoskeletal:  No tenderness.  No swelling or redness of joints.  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  1+ edema of right lower extremity.  PICC to right upper arm with no signs of infection.  Neurological:  Alert and oriented to person, place, and time.  No facial droop.  No slurred speech.   Skin:  Stage IV sacral  decubitus wound status post excisional debridement.  Psychiatric:  Normal mood and affect.  Behavior is normal.      ----------------------------------------------------------------------------------------------------------------------            Results from last 7 days   Lab Units 02/24/23 0132 02/21/23  0150   WBC 10*3/mm3 11.11* 12.20*   HEMOGLOBIN g/dL 11.4* 11.0*   HEMATOCRIT % 38.7 38.4   MCV fL 91.3 92.1   MCHC g/dL 29.5* 28.6*   PLATELETS 10*3/mm3 320 365     Results from last 7 days   Lab Units 02/24/23 0132 02/21/23 0150 02/20/23  0957   SODIUM mmol/L 135* 137 134*   POTASSIUM mmol/L 4.2 3.7 4.0   CHLORIDE mmol/L 97* 100 98   CO2 mmol/L 24.1 23.6 26.5   BUN mg/dL 39* 24* 24*   CREATININE mg/dL 1.01 0.97 0.80   CALCIUM mg/dL 9.9 9.4 9.3   GLUCOSE mg/dL 249* 257* 401*   Estimated Creatinine Clearance: 130.6 mL/min (by C-G formula based on SCr of 1.01 mg/dL).  No results found for: AMMONIA    Glucose   Date/Time Value Ref Range Status   02/26/2023 0638 191 (H) 70 - 130 mg/dL Final     Comment:     Meter: [id number] : [id number] DIONMARGUERITE ASIF   02/25/2023 2022 136 (H) 70 - 130 mg/dL Final     Comment:     Meter: [id number] : [id number] ARTURO JACKSONCLAIR   02/25/2023 1733 162 (H) 70 - 130 mg/dL Final     Comment:     Meter: [id number] : [id number] [facility]   02/25/2023 1039 213 (H) 70 - 130 mg/dL Final     Comment:     Meter: [id number] : [id number] BERT SALOMON   02/25/2023 0615 242 (H) 70 - 130 mg/dL Final     Comment:     Meter: [id number] : [id number] norbert meza   02/24/2023 1934 187 (H) 70 - 130 mg/dL Final     Comment:     Meter: [id number] : [id number] Noemi NICHOLE   02/24/2023 1607 200 (H) 70 - 130 mg/dL Final     Comment:     Meter: [id number] : [id number] leti montoya   02/24/2023 1116 273 (H) 70 - 130 mg/dL Final     Comment:     Meter: [id number] : [id number] leti montoya     Lab Results   Component Value Date    HGBA1C 11.50 (H) 02/13/2023     Lab  Results   Component Value Date    TSH 3.780 07/19/2022    FREET4 1.31 06/02/2021       Blood Culture   Date Value Ref Range Status   02/12/2023 No growth at 24 hours  Preliminary   02/12/2023 No growth at 24 hours  Preliminary     No results found for: URINECX  No results found for: WOUNDCX  No results found for: STOOLCX  No results found for: RESPCX  Pain Management Panel       Pain Management Panel Latest Ref Rng & Units 6/2/2021 8/19/2018    AMPHETAMINES SCREEN, URINE Negative Negative Negative    BARBITURATES SCREEN Negative Negative Negative    BENZODIAZEPINE SCREEN, URINE Negative Negative Negative    BUPRENORPHINEUR Negative Negative Negative    COCAINE SCREEN, URINE Negative Negative Negative    METHADONE SCREEN, URINE Negative Negative Negative              ----------------------------------------------------------------------------------------------------------------------  Imaging Results (Last 24 Hours)       ** No results found for the last 24 hours. **            -----------------------------------------------------------------------------------  Pertinent Infectious Disease Results     CT abdomen pelvis with contrast on 2/12/2023 showed there is a new decubitus ulcer just to the right of midline at the level of the coccyx with constellation of imaging findings most characteristic of acute infection/osteomyelitis. Small pocket of air and fluid adjacent to the coccyx measuring 3.7 x 4.1 cm could reflect phlegmon or abscess. Chronic bilateral decubitus ulcers at the level of the ischial tuberosities bilaterally with imaging findings suggestive of chronic infection/osteomyelitis, similar to prior. Hepatic steatosis and hepatomegaly with borderline splenomegaly. Nonobstructing left renal stones. Postoperative changes of left lower quadrant and colostomy and right mid abdominal ileal conduit formation. Diverticulosis. No diverticulitis or bowel obstruction. COVID-19 and flu A/B PCR on 2/12/2023 was  negative.    Status post excisional debridement of sacral ulcer on 2/13/2023 with Dr. Taylor.     Assessment/Plan         ASSESSMENT:    Septic shock with lactic acid greater than 4 on admission  Acute and chronic decubitus ulcerations with osteomyelitis and possible abscess      PLAN:    I examined the patient this morning and he is sitting up in bed on room air in no apparent distress.  Eating breakfast and appears comfortable.  Denies any particular complaints at this time, but has an occasional cough without production of sputum.  Nurse is at bedside and reports no new issues.  Lungs are clear to auscultation bilaterally without wheezes, rales, or rhonchi.  Abdomen is distended, but soft, nontender, with normal bowel sounds.  Ostomy appears healthy.  Afebrile, no increased output from ostomy.  No new labs today.    Recommend to continue on Rocephin through 3/29/2023 for osteomyelitis, as tissue cultures finalized as E. coli.  Patient seems to be stable from an infectious disease standpoint    Code Status:   Code Status and Medical Interventions:   Ordered at: 02/21/23 1034     Code Status (Patient has no pulse and is not breathing):    CPR (Attempt to Resuscitate)     Medical Interventions (Patient has pulse or is breathing):    Full Support       Christy Caro PA-C  02/26/23  09:02 EST

## 2023-02-27 LAB
ALBUMIN SERPL-MCNC: 3.3 G/DL (ref 3.5–5.2)
ALBUMIN/GLOB SERPL: 0.8 G/DL
ALP SERPL-CCNC: 117 U/L (ref 39–117)
ALT SERPL W P-5'-P-CCNC: 35 U/L (ref 1–41)
ANION GAP SERPL CALCULATED.3IONS-SCNC: 11.3 MMOL/L (ref 5–15)
AST SERPL-CCNC: 29 U/L (ref 1–40)
BASOPHILS # BLD AUTO: 0.04 10*3/MM3 (ref 0–0.2)
BASOPHILS NFR BLD AUTO: 0.4 % (ref 0–1.5)
BILIRUB SERPL-MCNC: 0.2 MG/DL (ref 0–1.2)
BUN SERPL-MCNC: 38 MG/DL (ref 6–20)
BUN/CREAT SERPL: 43.2 (ref 7–25)
CALCIUM SPEC-SCNC: 9.9 MG/DL (ref 8.6–10.5)
CHLORIDE SERPL-SCNC: 101 MMOL/L (ref 98–107)
CO2 SERPL-SCNC: 23.7 MMOL/L (ref 22–29)
CREAT SERPL-MCNC: 0.88 MG/DL (ref 0.76–1.27)
DEPRECATED RDW RBC AUTO: 49.4 FL (ref 37–54)
EGFRCR SERPLBLD CKD-EPI 2021: 108.1 ML/MIN/1.73
EOSINOPHIL # BLD AUTO: 0.18 10*3/MM3 (ref 0–0.4)
EOSINOPHIL NFR BLD AUTO: 1.8 % (ref 0.3–6.2)
ERYTHROCYTE [DISTWIDTH] IN BLOOD BY AUTOMATED COUNT: 14.6 % (ref 12.3–15.4)
GLOBULIN UR ELPH-MCNC: 4.4 GM/DL
GLUCOSE BLDC GLUCOMTR-MCNC: 149 MG/DL (ref 70–130)
GLUCOSE BLDC GLUCOMTR-MCNC: 181 MG/DL (ref 70–130)
GLUCOSE BLDC GLUCOMTR-MCNC: 192 MG/DL (ref 70–130)
GLUCOSE BLDC GLUCOMTR-MCNC: 260 MG/DL (ref 70–130)
GLUCOSE SERPL-MCNC: 185 MG/DL (ref 65–99)
HCT VFR BLD AUTO: 39.4 % (ref 37.5–51)
HGB BLD-MCNC: 11.4 G/DL (ref 13–17.7)
HYPOCHROMIA BLD QL: NORMAL
IMM GRANULOCYTES # BLD AUTO: 0.05 10*3/MM3 (ref 0–0.05)
IMM GRANULOCYTES NFR BLD AUTO: 0.5 % (ref 0–0.5)
LYMPHOCYTES # BLD AUTO: 1.99 10*3/MM3 (ref 0.7–3.1)
LYMPHOCYTES NFR BLD AUTO: 19.8 % (ref 19.6–45.3)
MCH RBC QN AUTO: 26.8 PG (ref 26.6–33)
MCHC RBC AUTO-ENTMCNC: 28.9 G/DL (ref 31.5–35.7)
MCV RBC AUTO: 92.7 FL (ref 79–97)
MONOCYTES # BLD AUTO: 0.33 10*3/MM3 (ref 0.1–0.9)
MONOCYTES NFR BLD AUTO: 3.3 % (ref 5–12)
NEUTROPHILS NFR BLD AUTO: 7.45 10*3/MM3 (ref 1.7–7)
NEUTROPHILS NFR BLD AUTO: 74.2 % (ref 42.7–76)
NRBC BLD AUTO-RTO: 0 /100 WBC (ref 0–0.2)
PLAT MORPH BLD: NORMAL
PLATELET # BLD AUTO: 276 10*3/MM3 (ref 140–450)
PMV BLD AUTO: 9.8 FL (ref 6–12)
POLYCHROMASIA BLD QL SMEAR: NORMAL
POTASSIUM SERPL-SCNC: 4.4 MMOL/L (ref 3.5–5.2)
PROT SERPL-MCNC: 7.7 G/DL (ref 6–8.5)
RBC # BLD AUTO: 4.25 10*6/MM3 (ref 4.14–5.8)
SODIUM SERPL-SCNC: 136 MMOL/L (ref 136–145)
WBC NRBC COR # BLD: 10.04 10*3/MM3 (ref 3.4–10.8)

## 2023-02-27 PROCEDURE — 82962 GLUCOSE BLOOD TEST: CPT

## 2023-02-27 PROCEDURE — 63710000001 INSULIN ASPART PER 5 UNITS: Performed by: STUDENT IN AN ORGANIZED HEALTH CARE EDUCATION/TRAINING PROGRAM

## 2023-02-27 PROCEDURE — 80053 COMPREHEN METABOLIC PANEL: CPT | Performed by: STUDENT IN AN ORGANIZED HEALTH CARE EDUCATION/TRAINING PROGRAM

## 2023-02-27 PROCEDURE — 99308 SBSQ NF CARE LOW MDM 20: CPT | Performed by: INTERNAL MEDICINE

## 2023-02-27 PROCEDURE — 63710000001 INSULIN DETEMIR PER 5 UNITS: Performed by: STUDENT IN AN ORGANIZED HEALTH CARE EDUCATION/TRAINING PROGRAM

## 2023-02-27 PROCEDURE — 85025 COMPLETE CBC W/AUTO DIFF WBC: CPT | Performed by: STUDENT IN AN ORGANIZED HEALTH CARE EDUCATION/TRAINING PROGRAM

## 2023-02-27 PROCEDURE — 25010000002 CEFTRIAXONE PER 250 MG: Performed by: STUDENT IN AN ORGANIZED HEALTH CARE EDUCATION/TRAINING PROGRAM

## 2023-02-27 PROCEDURE — 85007 BL SMEAR W/DIFF WBC COUNT: CPT | Performed by: STUDENT IN AN ORGANIZED HEALTH CARE EDUCATION/TRAINING PROGRAM

## 2023-02-27 RX ADMIN — Medication 1000 UNITS: at 09:15

## 2023-02-27 RX ADMIN — BACLOFEN 30 MG: 10 TABLET ORAL at 12:06

## 2023-02-27 RX ADMIN — METFORMIN HYDROCHLORIDE 1000 MG: 500 TABLET ORAL at 17:17

## 2023-02-27 RX ADMIN — INSULIN ASPART 6 UNITS: 100 INJECTION, SOLUTION INTRAVENOUS; SUBCUTANEOUS at 12:07

## 2023-02-27 RX ADMIN — NYSTATIN: 100000 POWDER TOPICAL at 21:55

## 2023-02-27 RX ADMIN — CEFTRIAXONE 2 G: 2 INJECTION, POWDER, FOR SOLUTION INTRAMUSCULAR; INTRAVENOUS at 09:16

## 2023-02-27 RX ADMIN — SPIRONOLACTONE 25 MG: 25 TABLET ORAL at 09:15

## 2023-02-27 RX ADMIN — INSULIN ASPART 2 UNITS: 100 INJECTION, SOLUTION INTRAVENOUS; SUBCUTANEOUS at 09:16

## 2023-02-27 RX ADMIN — DICYCLOMINE HYDROCHLORIDE 10 MG: 10 CAPSULE ORAL at 21:55

## 2023-02-27 RX ADMIN — GABAPENTIN 800 MG: 400 CAPSULE ORAL at 09:15

## 2023-02-27 RX ADMIN — INSULIN ASPART 30 UNITS: 100 INJECTION, SOLUTION INTRAVENOUS; SUBCUTANEOUS at 12:07

## 2023-02-27 RX ADMIN — DICYCLOMINE HYDROCHLORIDE 10 MG: 10 CAPSULE ORAL at 09:15

## 2023-02-27 RX ADMIN — SUCRALFATE 1 G: 1 TABLET ORAL at 09:15

## 2023-02-27 RX ADMIN — INSULIN ASPART 30 UNITS: 100 INJECTION, SOLUTION INTRAVENOUS; SUBCUTANEOUS at 17:17

## 2023-02-27 RX ADMIN — Medication 1 CAPSULE: at 09:15

## 2023-02-27 RX ADMIN — INSULIN ASPART 2 UNITS: 100 INJECTION, SOLUTION INTRAVENOUS; SUBCUTANEOUS at 17:17

## 2023-02-27 RX ADMIN — MAGNESIUM GLUCONATE 500 MG ORAL TABLET 1200 MG: 500 TABLET ORAL at 09:15

## 2023-02-27 RX ADMIN — BACLOFEN 30 MG: 10 TABLET ORAL at 09:15

## 2023-02-27 RX ADMIN — BACLOFEN 30 MG: 10 TABLET ORAL at 17:17

## 2023-02-27 RX ADMIN — Medication 10 ML: at 21:55

## 2023-02-27 RX ADMIN — SACUBITRIL AND VALSARTAN 1 TABLET: 24; 26 TABLET, FILM COATED ORAL at 21:54

## 2023-02-27 RX ADMIN — NYSTATIN: 100000 POWDER TOPICAL at 09:16

## 2023-02-27 RX ADMIN — GABAPENTIN 800 MG: 400 CAPSULE ORAL at 22:02

## 2023-02-27 RX ADMIN — MODAFINIL 200 MG: 100 TABLET ORAL at 09:15

## 2023-02-27 RX ADMIN — VITAMINS A AND D OINTMENT 1 APPLICATION: 15.5; 53.4 OINTMENT TOPICAL at 21:55

## 2023-02-27 RX ADMIN — CARVEDILOL 12.5 MG: 6.25 TABLET, FILM COATED ORAL at 17:17

## 2023-02-27 RX ADMIN — GLIPIZIDE 5 MG: 5 TABLET ORAL at 17:17

## 2023-02-27 RX ADMIN — ARIPIPRAZOLE 10 MG: 10 TABLET ORAL at 21:55

## 2023-02-27 RX ADMIN — EMPAGLIFLOZIN 10 MG: 10 TABLET, FILM COATED ORAL at 09:15

## 2023-02-27 RX ADMIN — BUMETANIDE 2 MG: 1 TABLET ORAL at 09:15

## 2023-02-27 RX ADMIN — Medication 10 ML: at 09:17

## 2023-02-27 RX ADMIN — DULOXETINE HYDROCHLORIDE 60 MG: 60 CAPSULE, DELAYED RELEASE ORAL at 21:54

## 2023-02-27 RX ADMIN — DULOXETINE HYDROCHLORIDE 60 MG: 60 CAPSULE, DELAYED RELEASE ORAL at 09:15

## 2023-02-27 RX ADMIN — PANTOPRAZOLE SODIUM 40 MG: 40 TABLET, DELAYED RELEASE ORAL at 09:15

## 2023-02-27 RX ADMIN — METFORMIN HYDROCHLORIDE 1000 MG: 500 TABLET ORAL at 09:15

## 2023-02-27 RX ADMIN — VITAMINS A AND D OINTMENT 1 APPLICATION: 15.5; 53.4 OINTMENT TOPICAL at 09:16

## 2023-02-27 RX ADMIN — CARVEDILOL 12.5 MG: 6.25 TABLET, FILM COATED ORAL at 09:15

## 2023-02-27 RX ADMIN — INSULIN ASPART 30 UNITS: 100 INJECTION, SOLUTION INTRAVENOUS; SUBCUTANEOUS at 09:17

## 2023-02-27 RX ADMIN — ATORVASTATIN CALCIUM 10 MG: 10 TABLET, FILM COATED ORAL at 21:54

## 2023-02-27 RX ADMIN — BACLOFEN 30 MG: 10 TABLET ORAL at 21:54

## 2023-02-27 RX ADMIN — INSULIN DETEMIR 55 UNITS: 100 INJECTION, SOLUTION SUBCUTANEOUS at 22:02

## 2023-02-27 RX ADMIN — VERICIGUAT 2.5 MG: 2.5 TABLET, FILM COATED ORAL at 09:16

## 2023-02-27 RX ADMIN — FERROUS SULFATE TAB 325 MG (65 MG ELEMENTAL FE) 325 MG: 325 (65 FE) TAB at 09:15

## 2023-02-27 RX ADMIN — APIXABAN 5 MG: 5 TABLET, FILM COATED ORAL at 21:54

## 2023-02-27 RX ADMIN — Medication 1 TABLET: at 09:15

## 2023-02-27 RX ADMIN — OXYCODONE HYDROCHLORIDE AND ACETAMINOPHEN 500 MG: 500 TABLET ORAL at 09:15

## 2023-02-27 RX ADMIN — APIXABAN 5 MG: 5 TABLET, FILM COATED ORAL at 09:15

## 2023-02-27 RX ADMIN — GABAPENTIN 800 MG: 400 CAPSULE ORAL at 17:19

## 2023-02-27 RX ADMIN — GLIPIZIDE 5 MG: 5 TABLET ORAL at 09:15

## 2023-02-27 RX ADMIN — INSULIN DETEMIR 55 UNITS: 100 INJECTION, SOLUTION SUBCUTANEOUS at 09:17

## 2023-02-27 RX ADMIN — PANTOPRAZOLE SODIUM 40 MG: 40 TABLET, DELAYED RELEASE ORAL at 21:54

## 2023-02-27 RX ADMIN — SACUBITRIL AND VALSARTAN 1 TABLET: 24; 26 TABLET, FILM COATED ORAL at 09:15

## 2023-02-27 NOTE — CASE MANAGEMENT/SOCIAL WORK
Discharge Planning Assessment   Fabricio     Patient Name: Ken Krueger  MRN: [id number]  Today's Date: 2/27/2023    Admit Date: 2/21/2023    Plan: Pt was admitted to swing bed on 2/21/23. SS spoke with Swingbed RN who states pt is approved through 2/28/23 and states he submitted clinical updates for continues stay today. SS to follow.      Discharge Plan     Row Name 02/27/23 1213       Plan    Plan Pt was admitted to swing bed on 2/21/23. SS spoke with Swingbed RN who states pt is approved through 2/28/23 and states he submitted clinical updates for continues stay today. SS to follow.                HUMPHREY Loya

## 2023-02-27 NOTE — PAYOR COMM NOTE
"Helder Fenton RN  Swing Bed Nurse  [phone] Ex [phone] FAX: [phone]  [email or web address]  [facility]  NPI [id number]  Reference Number [id number]    Clinical updates to request additional swing bed days for IV antibiotic therapy via PICC line      Ken Deng (47 y.o. Male)    YOB: 1975  Social Security Number:   Address: [address] [place] [address]  Home Phone: [phone]  MRN: [id number]  Restorationist: Sabianist  Marital Status:         Admission Date: 2/21/23  Admission Type: Urgent  Admitting Provider: Robinson Yanes DO  Attending Provider: Yovana Pack MD  Department, Room/Bed: [place] [id number]/  Discharge Date:   Discharge Disposition:   Discharge Destination:               Attending Provider: Yovana Pack MD    Allergies: Keflex [Cephalexin]    Isolation: None   Infection: None   Code Status: CPR    Ht: 180.3 cm (71\")   Wt: 138 kg (303 lb 9.2 oz)    Admission Cmt: None   Principal Problem: Osteomyelitis (HCC) [M86.9]                 Active Insurance as of 2/21/2023     Primary Coverage     Payor Plan Insurance Group Employer/Plan Group    [facility] MEDICARE REPLACEMENT [employer] MEDICARE REPLACEMENT      Payor Plan Address Payor Plan Phone Number Payor Plan Fax Number Effective Dates    [address] [address] [phone]  5/1/2021 - None Entered    [facility] [address]       Subscriber Name Subscriber Birth Date Member ID       KEN DENG 1975 [id number]           Secondary Coverage     Payor Plan Insurance Group Employer/Plan Group    [place]      Payor Plan Address Payor Plan Phone Number Payor Plan Fax Number Effective Dates    [address] [phone]  7/13/2019 - None Entered    [facility] [address]       Subscriber Name Subscriber Birth Date Member ID       KEN DENG 1975 [id number]                 Emergency Contacts      (Rel.) Home Phone Work " Phone Mobile Phone    Pineda Krueger (Power of ) [phone] None None              Current Facility-Administered Medications   Medication Dose Route Frequency Provider Last Rate Last Admin   • acetaminophen (TYLENOL) tablet 650 mg  650 mg Oral Q4H PRN Robinson Yanes DO        Or   • acetaminophen (TYLENOL) 160 MG/5ML solution 650 mg  650 mg Oral Q4H PRN Robinson Yaens DO        Or   • acetaminophen (TYLENOL) suppository 650 mg  650 mg Rectal Q4H PRN Robinson Yanes DO       • Acidophilus/Pectin capsule 1 capsule  1 capsule Oral Daily Robinson Yanes DO   1 capsule at 02/26/23 0828   • apixaban (ELIQUIS) tablet 5 mg  5 mg Oral Q12H Robinson Yanes DO   5 mg at 02/26/23 2133   • ARIPiprazole (ABILIFY) tablet 10 mg  10 mg Oral Nightly Robinson Yanes DO   10 mg at 02/26/23 2132   • ascorbic acid (VITAMIN C) tablet 500 mg  500 mg Oral Daily Robinson Yanes DO   500 mg at 02/26/23 0827   • atorvastatin (LIPITOR) tablet 10 mg  10 mg Oral Nightly Robinson Yanes DO   10 mg at 02/26/23 2133   • baclofen (LIORESAL) tablet 30 mg  30 mg Oral 4x Daily With Meals & Nightly Robinson Yanes DO   30 mg at 02/26/23 2139   • bumetanide (BUMEX) tablet 2 mg  2 mg Oral Daily Robinson Yanes DO   2 mg at 02/26/23 0828   • carvedilol (COREG) tablet 12.5 mg  12.5 mg Oral BID With Meals Robinson Yanes DO   12.5 mg at 02/26/23 1704   • cefTRIAXone (ROCEPHIN) 2 g in sodium chloride 0.9 % 100 mL IVPB-VTB  2 g Intravenous Q24H Robinson Yanes  mL/hr at 02/26/23 0843 2 g at 02/26/23 0843   • cholecalciferol (VITAMIN D3) tablet 1,000 Units  1,000 Units Oral Daily Robinson Yanes DO   1,000 Units at 02/26/23 0827   • dextrose (D50W) (25 g/50 mL) IV injection 25 g  25 g Intravenous Q15 Min PRN Robinson Yanes DO       • dextrose (GLUTOSE) oral gel 15 g  15 g Oral Q15 Min PRN Robinson Yanes DO       • dicyclomine (BENTYL) capsule 10 mg  10 mg Oral BID Robinson Yanes DO   10 mg at 02/26/23 2133   • DULoxetine (CYMBALTA) DR capsule 60  mg  60 mg Oral BID Robinson Yanes DO   60 mg at 02/26/23 2132   • empagliflozin (JARDIANCE) tablet 10 mg  10 mg Oral Daily Robinson Yanes DO   10 mg at 02/26/23 0827   • ferrous sulfate tablet 325 mg  325 mg Oral Daily With Breakfast Robinson Yanes DO   325 mg at 02/26/23 0828   • gabapentin (NEURONTIN) capsule 800 mg  800 mg Oral TID Robinson Yanes DO   800 mg at 02/26/23 2133   • glipizide (GLUCOTROL) tablet 5 mg  5 mg Oral BID AC Robinson Yanes DO   5 mg at 02/26/23 1700   • glucagon (human recombinant) (GLUCAGEN DIAGNOSTIC) injection 1 mg  1 mg Intramuscular Q15 Min PRN Robinson Yanes DO       • Insulin Aspart (novoLOG) injection 0-9 Units  0-9 Units Subcutaneous TID AC Robinson Yanes DO   2 Units at 02/26/23 1214   • Insulin Aspart (novoLOG) injection 30 Units  30 Units Subcutaneous TID With Meals Robinson Yanes DO   30 Units at 02/26/23 1659   • insulin detemir (LEVEMIR) injection 55 Units  55 Units Subcutaneous Q12H Robinson Yanes DO   55 Units at 02/26/23 2132   • magic barrier cream 1 application  1 application Topical PRN Robinson Yanes DO       • magic barrier cream 1 application  1 application Topical BID Robinson Yanes DO   1 application at 02/26/23 2134   • magnesium oxide (MAG-OX) tablet 1,200 mg  1,200 mg Oral Daily Robinson Yanes DO   1,200 mg at 02/26/23 0827   • metFORMIN (GLUCOPHAGE) tablet 1,000 mg  1,000 mg Oral BID With Meals Robinson Yanes DO   1,000 mg at 02/26/23 1704   • modafinil (PROVIGIL) tablet 200 mg  200 mg Oral Daily Robinson Yanes DO   200 mg at 02/26/23 0828   • multivitamin with minerals 1 tablet  1 tablet Oral Daily Robinson Yanes DO   1 tablet at 02/26/23 0828   • nitroglycerin (NITROSTAT) SL tablet 0.4 mg  0.4 mg Sublingual Q5 Min PRN Robinson Yanes DO       • nystatin (MYCOSTATIN) powder   Topical Q12H Robinson Yanes DO   Given at 02/26/23 2134   • pantoprazole (PROTONIX) EC tablet 40 mg  40 mg Oral BID Robinson Yanes DO   40 mg at 02/26/23 2132   • Pharmacy  Consult - Pharmacy to dose   Does not apply Continuous PRN Robinson Yanes DO       • potassium chloride (K-DUR,KLOR-CON) CR tablet 40 mEq  40 mEq Oral PRN Robinson Yanes DO        Or   • potassium chloride (KLOR-CON) packet 40 mEq  40 mEq Oral PRN Robinson Yanes DO        Or   • potassium chloride 10 mEq in 100 mL IVPB  10 mEq Intravenous Q1H PRN Robinson Yanes DO       • sacubitril-valsartan (ENTRESTO) 24-26 MG tablet 1 tablet  1 tablet Oral Q12H Robinson Yanes DO   1 tablet at 02/26/23 2132   • sodium chloride 0.9 % flush 10 mL  10 mL Intravenous Q12H Robinson Yanes DO   10 mL at 02/26/23 2134   • sodium chloride 0.9 % flush 10 mL  10 mL Intravenous PRN Robinson Yanes DO   10 mL at 02/24/23 2056   • sodium chloride 0.9 % infusion 40 mL  40 mL Intravenous PRN Robinson Yanes DO       • spironolactone (ALDACTONE) tablet 25 mg  25 mg Oral Daily Robinson Yanes DO   25 mg at 02/25/23 0807   • sucralfate (CARAFATE) tablet 1 g  1 g Oral Daily Robinson Yanes DO   1 g at 02/26/23 0827   • [START ON 3/7/2023] tuberculin injection 5 Units  5 Units Intradermal Once Robinson Yanes DO       • Vericiguat tablet 2.5 mg  2.5 mg Oral Daily Robinson Yanes DO   2.5 mg at 02/26/23 0826     Operative/Procedure Notes (most recent note)    No notes of this type exist for this encounter.            Physician Progress Notes (last 72 hours)      Robinson Yanes DO at 02/26/23 1720        Patient not seen and examined at bedside today.  Patient admitted to swing bed on 2/21/2023.  Patient chart and vitals and labs reviewed and remained stable, will repeat labs tomorrow to continue to monitor renal function while receiving long-term IV antibiotic therapy.  Blood glucose improved and insulin regimen adjusted further with increasing glipizide and decrease in insulin.  Patient currently receiving monotherapy with rocephin With planned course of treatment through 3/29/2023. Patient previously seen and evaluated by PT and OT and plan  for discharge to return home with  assist in care as patient was previously on at home.  Continue wound care and antibiotic therapy.    Electronically signed by Robinson Yanes DO at 23 [phone]     Christy Caro PA-C at 23 0902                     PROGRESS NOTE         Patient Identification:  Name:  Ken Krueger  Age:  47 y.o.  Sex:  male  :  1975  MRN:  [id number]  Visit Number:  [id number]  Primary Care Provider:  Franchesca Hodges APRN         LOS: 5 days       ----------------------------------------------------------------------------------------------------------------------  Subjective       Chief Complaints:    No chief complaint on file.        Interval History:      The patient is sitting up in bed on room air in no apparent distress.  Eating breakfast and appears comfortable.  Denies any particular complaints at this time, but has an occasional cough without production of sputum.  Nurse is at bedside and reports no new issues.  Afebrile, no increased output from ostomy.  No new labs today.    Review of Systems:    Constitutional: no fever, chills and night sweats.  No fatigue.  Eyes: no eye drainage, itching or redness.  HEENT: no mouth sores, dysphagia or nose bleed.  Respiratory: no for shortness of breath.  Dry cough.  Cardiovascular: no chest pain, no palpitations, no orthopnea.  Gastrointestinal: no nausea, vomiting or diarrhea. No abdominal pain, hematemesis or rectal bleeding.  Genitourinary: no dysuria or polyuria.  Hematologic/lymphatic: no lymph node abnormalities, no easy bruising or easy bleeding.  Musculoskeletal: No muscle or joint pain.  Skin: No rash and no itching.  Decubitus ulcers.  Neurological: no loss of consciousness, no seizure, no headache.  Behavioral/Psych: no depression or suicidal ideation.  Endocrine: no hot flashes.  Immunologic:  negative.    ----------------------------------------------------------------------------------------------------------------------      Objective       \[facility] [place] [facility]\"" Meds:    Pharmacy Consult - Pharmacy to dose,       ----------------------------------------------------------------------------------------------------------------------    Vital Signs:  Temp:  [97.2 °F (36.2 °C)-98 °F (36.7 °C)] 97.7 °F (36.5 °C)  Heart Rate:  [77-86] 78  Resp:  [18-20] 20  BP: ()/(53-72) 101/62  No data found.  SpO2 Percentage    02/24/23 1857 02/25/23 1900 02/26/23 0658   SpO2: 96% 98% 97%     SpO2:  [97 %-98 %] 97 %  on   ;   Device (Oxygen Therapy): room air    Body mass index is 42.34 kg/m².  Wt Readings from Last 3 Encounters:   02/21/23 (!) 138 kg (303 lb 9.2 oz)   02/20/23 (!) 139 kg (306 lb 14.1 oz)   12/13/22 (!) 141 kg (310 lb 14.4 oz)        Intake/Output Summary (Last 24 hours) at 2/26/2023 0902  Last data filed at 2/26/2023 0822  Gross per 24 hour   Intake 2490 ml   Output 3350 ml   Net -860 ml     Diet: Diabetic Diets; Consistent Carbohydrate; Texture: Regular Texture (IDDSI 7); Fluid Consistency: Thin (IDDSI 0)  ----------------------------------------------------------------------------------------------------------------------      Physical Exam:    Constitutional: Very pleasant obese male is sitting up in bed on room air in no apparent distress.  Eating breakfast and appears comfortable.  Nurses at bedside.  HENT:  Head: Normocephalic and atraumatic.  Mouth:  Moist mucous membranes.    Eyes:  Conjunctivae and EOM are normal.  No scleral icterus.  Neck:  Neck supple.  No JVD present.    Cardiovascular:  Normal rate, regular rhythm and normal heart sounds with no murmur. No edema.  Pulmonary/Chest:  No respiratory distress, no wheezes, no crackles, with normal breath sounds and good air movement.  Lungs clear to auscultation bilaterally  Abdominal: Obese.  Soft.  Bowel sounds are normal.  No distension and  no tenderness.  Ileal conduit and colostomy bag in place.  Musculoskeletal:  No tenderness.  No swelling or redness of joints.  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  1+ edema of right lower extremity.  PICC to right upper arm with no signs of infection.  Neurological:  Alert and oriented to person, place, and time.  No facial droop.  No slurred speech.   Skin:  Stage IV sacral decubitus wound status post excisional debridement.  Psychiatric:  Normal mood and affect.  Behavior is normal.      ----------------------------------------------------------------------------------------------------------------------            Results from last 7 days   Lab Units 02/24/23  0132 02/21/23  0150   WBC 10*3/mm3 11.11* 12.20*   HEMOGLOBIN g/dL 11.4* 11.0*   HEMATOCRIT % 38.7 38.4   MCV fL 91.3 92.1   MCHC g/dL 29.5* 28.6*   PLATELETS 10*3/mm3 320 365     Results from last 7 days   Lab Units 02/24/23  0132 02/21/23  0150 02/20/23  0957   SODIUM mmol/L 135* 137 134*   POTASSIUM mmol/L 4.2 3.7 4.0   CHLORIDE mmol/L 97* 100 98   CO2 mmol/L 24.1 23.6 26.5   BUN mg/dL 39* 24* 24*   CREATININE mg/dL 1.01 0.97 0.80   CALCIUM mg/dL 9.9 9.4 9.3   GLUCOSE mg/dL 249* 257* 401*   Estimated Creatinine Clearance: 130.6 mL/min (by C-G formula based on SCr of 1.01 mg/dL).  No results found for: AMMONIA    Glucose   Date/Time Value Ref Range Status   02/26/2023 0638 191 (H) 70 - 130 mg/dL Final     Comment:     Meter: [id number] : [id number] NINA ASIF   02/25/2023 2022 136 (H) 70 - 130 mg/dL Final     Comment:     Meter: [id number] : [id number] ARTURO CORTEZ   02/25/2023 1733 162 (H) 70 - 130 mg/dL Final     Comment:     Meter: [id number] : [id number] [facility]   02/25/2023 1039 213 (H) 70 - 130 mg/dL Final     Comment:     Meter: [id number] : [id number] [facility]   02/25/2023 0615 242 (H) 70 - 130 mg/dL Final     Comment:     Meter: [id number] : [id number] norbert meza   02/24/2023 1934 187 (H) 70 - 130 mg/dL Final      Comment:     Meter: [id number] : [id number] Noemi NICHOLE   02/24/2023 1607 200 (H) 70 - 130 mg/dL Final     Comment:     Meter: [id number] : [id number] leti montoya   02/24/2023 1116 273 (H) 70 - 130 mg/dL Final     Comment:     Meter: [id number] : [id number] leti montoya     Lab Results   Component Value Date    HGBA1C 11.50 (H) 02/13/2023     Lab Results   Component Value Date    TSH 3.780 07/19/2022    FREET4 1.31 06/02/2021       Blood Culture   Date Value Ref Range Status   02/12/2023 No growth at 24 hours  Preliminary   02/12/2023 No growth at 24 hours  Preliminary     No results found for: URINECX  No results found for: WOUNDCX  No results found for: STOOLCX  No results found for: RESPCX  Pain Management Panel     Pain Management Panel Latest Ref Rng & Units 6/2/2021 8/19/2018    AMPHETAMINES SCREEN, URINE Negative Negative Negative    BARBITURATES SCREEN Negative Negative Negative    BENZODIAZEPINE SCREEN, URINE Negative Negative Negative    BUPRENORPHINEUR Negative Negative Negative    COCAINE SCREEN, URINE Negative Negative Negative    METHADONE SCREEN, URINE Negative Negative Negative            ----------------------------------------------------------------------------------------------------------------------  Imaging Results (Last 24 Hours)     ** No results found for the last 24 hours. **          -----------------------------------------------------------------------------------  Pertinent Infectious Disease Results     CT abdomen pelvis with contrast on 2/12/2023 showed there is a new decubitus ulcer just to the right of midline at the level of the coccyx with constellation of imaging findings most characteristic of acute infection/osteomyelitis. Small pocket of air and fluid adjacent to the coccyx measuring 3.7 x 4.1 cm could reflect phlegmon or abscess. Chronic bilateral decubitus ulcers at the level of the ischial tuberosities bilaterally with imaging findings suggestive of  chronic infection/osteomyelitis, similar to prior. Hepatic steatosis and hepatomegaly with borderline splenomegaly. Nonobstructing left renal stones. Postoperative changes of left lower quadrant and colostomy and right mid abdominal ileal conduit formation. Diverticulosis. No diverticulitis or bowel obstruction. COVID-19 and flu A/B PCR on 2/12/2023 was negative.    Status post excisional debridement of sacral ulcer on 2/13/2023 with Dr. Taylor.     Assessment/Plan         ASSESSMENT:    Septic shock with lactic acid greater than 4 on admission  Acute and chronic decubitus ulcerations with osteomyelitis and possible abscess      PLAN:    I examined the patient this morning and he is sitting up in bed on room air in no apparent distress.  Eating breakfast and appears comfortable.  Denies any particular complaints at this time, but has an occasional cough without production of sputum.  Nurse is at bedside and reports no new issues.  Lungs are clear to auscultation bilaterally without wheezes, rales, or rhonchi.  Abdomen is distended, but soft, nontender, with normal bowel sounds.  Ostomy appears healthy.  Afebrile, no increased output from ostomy.  No new labs today.    Recommend to continue on Rocephin through 3/29/2023 for osteomyelitis, as tissue cultures finalized as E. coli.  Patient seems to be stable from an infectious disease standpoint    Code Status:   Code Status and Medical Interventions:   Ordered at: 02/21/23 1034     Code Status (Patient has no pulse and is not breathing):    CPR (Attempt to Resuscitate)     Medical Interventions (Patient has pulse or is breathing):    Full Support       Christy Caro PA-C  02/26/23  09:02 EST        Electronically signed by Christy Caro PA-C at 02/26/23 0905     Robinson Yanes DO at 02/25/23 1831        Patient not seen and examined at bedside today.  Patient admitted to swing bed on 2/21/2023.  Patient chart and vitals and labs reviewed and remained  stable, will repeat labs again tomorrow to continue to monitor renal function while receiving long-term IV antibiotic therapy.  Blood glucose improved and insulin regimen adjusted further with increasing glipizide and decrease in insulin.  Patient currently receiving monotherapy with rocephin With planned course of treatment through 3/29/2023. Patient previously seen and evaluated by PT and OT and plan for discharge to return home with 24/7 assist in care as patient was previously on at home.  Continue wound care and antibiotic therapy.    Electronically signed by Robinson Yanes DO at 23 183     Christy Caro PA-C at 23 0763                     PROGRESS NOTE         Patient Identification:  Name:  Ken Krueger  Age:  47 y.o.  Sex:  male  :  1975  MRN:  [id number]  Visit Number:  [id number]  Primary Care Provider:  Franchesca Hodges APRN         LOS: 4 days       ----------------------------------------------------------------------------------------------------------------------  Subjective       Chief Complaints:    No chief complaint on file.        Interval History:      Patient is resting in bed on CPAP in no apparent distress.  Awakens easily and denies any new complaints at this time.  Lungs are clear to auscultation bilaterally.  Abdomen is distended, but soft, nontender, normal bowel sounds.  Afebrile, no increased output from ostomy.  No new labs today.  No new imaging today.    Review of Systems:    Constitutional: no fever, chills and night sweats.  No fatigue.  Eyes: no eye drainage, itching or redness.  HEENT: no mouth sores, dysphagia or nose bleed.  Respiratory: no for shortness of breath, cough or production of sputum.  Cardiovascular: no chest pain, no palpitations, no orthopnea.  Gastrointestinal: no nausea, vomiting or diarrhea. No abdominal pain, hematemesis or rectal bleeding.  Genitourinary: no dysuria or polyuria.  Hematologic/lymphatic: no lymph node abnormalities, no  easy bruising or easy bleeding.  Musculoskeletal: No muscle or joint pain.  Skin: No rash and no itching.  Decubitus ulcers.  Neurological: no loss of consciousness, no seizure, no headache.  Behavioral/Psych: no depression or suicidal ideation.  Endocrine: no hot flashes.  Immunologic: negative.    ----------------------------------------------------------------------------------------------------------------------      Objective       [facility] Meds:    Pharmacy Consult - Pharmacy to dose,       ----------------------------------------------------------------------------------------------------------------------    Vital Signs:  Temp:  [97.3 °F (36.3 °C)-97.9 °F (36.6 °C)] 97.3 °F (36.3 °C)  Heart Rate:  [80-92] 80  Resp:  [18] 18  BP: (114-137)/(75-79) 137/79  No data found.  SpO2 Percentage    02/22/23 1431 02/24/23 0659 02/24/23 1857   SpO2: 93% 96% 96%     SpO2:  [96 %] 96 %  on   ;   Device (Oxygen Therapy): room air;NPPV/NIV    Body mass index is 42.34 kg/m².  Wt Readings from Last 3 Encounters:   02/21/23 (!) 138 kg (303 lb 9.2 oz)   02/20/23 (!) 139 kg (306 lb 14.1 oz)   12/13/22 (!) 141 kg (310 lb 14.4 oz)        Intake/Output Summary (Last 24 hours) at 2/25/2023 0718  Last data filed at 2/25/2023 0500  Gross per 24 hour   Intake 1660 ml   Output 5100 ml   Net -3440 ml     Diet: Diabetic Diets; Consistent Carbohydrate; Texture: Regular Texture (IDDSI 7); Fluid Consistency: Thin (IDDSI 0)  ----------------------------------------------------------------------------------------------------------------------      Physical Exam:    Constitutional: Very pleasant obese male is resting in bed on CPAP in no apparent distress.  Awakens easily.  HENT:  Head: Normocephalic and atraumatic.  Mouth:  Moist mucous membranes.    Eyes:  Conjunctivae and EOM are normal.  No scleral icterus.  Neck:  Neck supple.  No JVD present.    Cardiovascular:  Normal rate, regular rhythm and normal heart sounds with no murmur.  No edema.  Pulmonary/Chest:  No respiratory distress, no wheezes, no crackles, with normal breath sounds and good air movement.  Lungs clear to auscultation bilaterally  Abdominal: Obese.  Soft.  Bowel sounds are normal.  No distension and no tenderness.  Ileal conduit and colostomy bag in place.  Musculoskeletal:  No tenderness.  No swelling or redness of joints.  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  1+ edema of right lower extremity.  PICC to right upper arm with no signs of infection.  Neurological:  Alert and oriented to person, place, and time.  No facial droop.  No slurred speech.   Skin:  Stage IV sacral decubitus wound status post excisional debridement.  Psychiatric:  Normal mood and affect.  Behavior is normal.      ----------------------------------------------------------------------------------------------------------------------            Results from last 7 days   Lab Units 02/24/23  0132 02/21/23  0150   WBC 10*3/mm3 11.11* 12.20*   HEMOGLOBIN g/dL 11.4* 11.0*   HEMATOCRIT % 38.7 38.4   MCV fL 91.3 92.1   MCHC g/dL 29.5* 28.6*   PLATELETS 10*3/mm3 320 365     Results from last 7 days   Lab Units 02/24/23  0132 02/21/23  0150 02/20/23  0957   SODIUM mmol/L 135* 137 134*   POTASSIUM mmol/L 4.2 3.7 4.0   CHLORIDE mmol/L 97* 100 98   CO2 mmol/L 24.1 23.6 26.5   BUN mg/dL 39* 24* 24*   CREATININE mg/dL 1.01 0.97 0.80   CALCIUM mg/dL 9.9 9.4 9.3   GLUCOSE mg/dL 249* 257* 401*   Estimated Creatinine Clearance: 130.6 mL/min (by C-G formula based on SCr of 1.01 mg/dL).  No results found for: AMMONIA    Glucose   Date/Time Value Ref Range Status   02/25/2023 0615 242 (H) 70 - 130 mg/dL Final     Comment:     Meter: [id number] : [id number] norbertmendez butterfieldherb   02/24/2023 1934 187 (H) 70 - 130 mg/dL Final     Comment:     Meter: [id number] : [id number] Noemi NICHOLE   02/24/2023 1607 200 (H) 70 - 130 mg/dL Final     Comment:     Meter: [id number] : [id number] leti montoya   02/24/2023 1116 273 (H) 70 - 130 mg/dL  Final     Comment:     Meter: [id number] : [id number] leti floreseria   02/24/2023 0616 211 (H) 70 - 130 mg/dL Final     Comment:     Meter: [id number] : [id number] NINA ASIF   02/23/2023 2111 144 (H) 70 - 130 mg/dL Final     Comment:     Meter: [id number] : [id number] MARY JANE RICE   02/23/2023 1618 222 (H) 70 - 130 mg/dL Final     Comment:     Meter: [id number] : [id number] leti jan   02/23/2023 1123 270 (H) 70 - 130 mg/dL Final     Comment:     Meter: [id number] : [id number] leti montoya     Lab Results   Component Value Date    HGBA1C 11.50 (H) 02/13/2023     Lab Results   Component Value Date    TSH 3.780 07/19/2022    FREET4 1.31 06/02/2021       Blood Culture   Date Value Ref Range Status   02/12/2023 No growth at 24 hours  Preliminary   02/12/2023 No growth at 24 hours  Preliminary     No results found for: URINECX  No results found for: WOUNDCX  No results found for: STOOLCX  No results found for: RESPCX  Pain Management Panel     Pain Management Panel Latest Ref Rng & Units 6/2/2021 8/19/2018    AMPHETAMINES SCREEN, URINE Negative Negative Negative    BARBITURATES SCREEN Negative Negative Negative    BENZODIAZEPINE SCREEN, URINE Negative Negative Negative    BUPRENORPHINEUR Negative Negative Negative    COCAINE SCREEN, URINE Negative Negative Negative    METHADONE SCREEN, URINE Negative Negative Negative            ----------------------------------------------------------------------------------------------------------------------  Imaging Results (Last 24 Hours)     ** No results found for the last 24 hours. **          -----------------------------------------------------------------------------------  Pertinent Infectious Disease Results     CT abdomen pelvis with contrast on 2/12/2023 showed there is a new decubitus ulcer just to the right of midline at the level of the coccyx with constellation of imaging findings most characteristic of acute  infection/osteomyelitis. Small pocket of air and fluid adjacent to the coccyx measuring 3.7 x 4.1 cm could reflect phlegmon or abscess. Chronic bilateral decubitus ulcers at the level of the ischial tuberosities bilaterally with imaging findings suggestive of chronic infection/osteomyelitis, similar to prior. Hepatic steatosis and hepatomegaly with borderline splenomegaly. Nonobstructing left renal stones. Postoperative changes of left lower quadrant and colostomy and right mid abdominal ileal conduit formation. Diverticulosis. No diverticulitis or bowel obstruction. COVID-19 and flu A/B PCR on 2/12/2023 was negative.    Status post excisional debridement of sacral ulcer on 2/13/2023 with Dr. Taylor.     Assessment/Plan         ASSESSMENT:    Septic shock with lactic acid greater than 4 on admission  Acute and chronic decubitus ulcerations with osteomyelitis and possible abscess      PLAN:    I examined the patient this morning and he is resting in bed on CPAP in no apparent distress.  Awakens easily and denies any new complaints at this time.  Lungs are clear to auscultation bilaterally.  Abdomen is distended, but soft, nontender, normal bowel sounds.  Remains afebrile with no increased output from ostomy.  No new labs today.  No new imaging today.    Recommend to continue on Rocephin through 3/29/2023 for osteomyelitis, as tissue cultures finalized as E. coli.    Code Status:   Code Status and Medical Interventions:   Ordered at: 02/21/23 1034     Code Status (Patient has no pulse and is not breathing):    CPR (Attempt to Resuscitate)     Medical Interventions (Patient has pulse or is breathing):    Full Support       Christy Caro PA-C  02/25/23  07:18 EST        Electronically signed by Christy Caro PA-C at 02/26/23 0711     Robinson Yanes DO at 02/24/23 1902        Patient not seen and examined at bedside today.  Patient admitted to swing bed on 2/21/2023.  Patient chart and vitals and labs  reviewed and remained stable.  Patient currently receiving monotherapy with rocephin With planned course of treatment through 3/29/2023. Patient previously seen and evaluated by PT and OT and plan for discharge to return home with  assist in care as patient was previously on at home.  Continue wound care and antibiotic therapy.    Electronically signed by Robinson Yanes DO at 23 1909     Tra Jain MD at 23 6022                     PROGRESS NOTE         Patient Identification:  Name:  Ken Krueger  Age:  47 y.o.  Sex:  male  :  1975  MRN:  [id number]  Visit Number:  [id number]  Primary Care Provider:  Franchesca Hodges APRN         LOS: 3 days       ----------------------------------------------------------------------------------------------------------------------  Subjective       Chief Complaints:    No chief complaint on file.        Interval History:      Patient is sitting up in bed on room air no apparent distress.  Eating breakfast and appears comfortable.  No new issues or complaints at this time.  Denies muscle pain or arthralgias.  Nurse reports great wound healing of ulcer.  Lungs are clear to auscultation bilaterally.  Abdomen is distended, but soft, nontender, with normal bowel sounds.  Colostomy with soft stools.  Afebrile, no diarrhea.  WBC is improved at 11.11.    Review of Systems:    Constitutional: no fever, chills and night sweats.  No fatigue.  Eyes: no eye drainage, itching or redness.  HEENT: no mouth sores, dysphagia or nose bleed.  Respiratory: no for shortness of breath, cough or production of sputum.  Cardiovascular: no chest pain, no palpitations, no orthopnea.  Gastrointestinal: no nausea, vomiting or diarrhea. No abdominal pain, hematemesis or rectal bleeding.  Genitourinary: no dysuria or polyuria.  Hematologic/lymphatic: no lymph node abnormalities, no easy bruising or easy bleeding.  Musculoskeletal: No muscle or joint pain.  Skin: No rash and no  itching.  Decubitus ulcers.  Neurological: no loss of consciousness, no seizure, no headache.  Behavioral/Psych: no depression or suicidal ideation.  Endocrine: no hot flashes.  Immunologic: negative.    ----------------------------------------------------------------------------------------------------------------------      Objective       Current Hospital Meds:    Pharmacy Consult - Pharmacy to dose,       ----------------------------------------------------------------------------------------------------------------------    Vital Signs:  Temp:  [97.7 °F (36.5 °C)-97.9 °F (36.6 °C)] 97.9 °F (36.6 °C)  Heart Rate:  [82-87] 82  Resp:  [18-20] 20  BP: (117-131)/(65-73) 131/73  Mean Arterial Pressure (Non-Invasive) for the past 24 hrs (Last 3 readings):   Noninvasive MAP (mmHg)   02/23/23 1900 86     SpO2 Percentage    02/22/23 1047 02/22/23 1431 02/24/23 0659   SpO2: 90% 93% 96%     SpO2:  [96 %] 96 %  on   ;   Device (Oxygen Therapy): room air    Body mass index is 42.34 kg/m².  Wt Readings from Last 3 Encounters:   02/21/23 (!) 138 kg (303 lb 9.2 oz)   02/20/23 (!) 139 kg (306 lb 14.1 oz)   12/13/22 (!) 141 kg (310 lb 14.4 oz)        Intake/Output Summary (Last 24 hours) at 2/24/2023 0924  Last data filed at 2/24/2023 0900  Gross per 24 hour   Intake 480 ml   Output 4800 ml   Net -4320 ml     Diet: Diabetic Diets; Consistent Carbohydrate; Texture: Regular Texture (IDDSI 7); Fluid Consistency: Thin (IDDSI 0)  ----------------------------------------------------------------------------------------------------------------------      Physical Exam:    Constitutional: Very pleasant obese male is sitting up in bed on room air no apparent distress.  Eating breakfast and appears comfortable.  HENT:  Head: Normocephalic and atraumatic.  Mouth:  Moist mucous membranes.    Eyes:  Conjunctivae and EOM are normal.  No scleral icterus.  Neck:  Neck supple.  No JVD present.    Cardiovascular:  Normal rate, regular rhythm and  normal heart sounds with no murmur. No edema.  Pulmonary/Chest:  No respiratory distress, no wheezes, no crackles, with normal breath sounds and good air movement.  Lungs clear to auscultation bilaterally  Abdominal: Obese.  Soft.  Bowel sounds are normal.  No distension and no tenderness.  Ileal conduit and colostomy bag in place.  Musculoskeletal:  No tenderness.  No swelling or redness of joints.  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  1+ edema of right lower extremity.  PICC to right upper arm with no signs of infection.  Neurological:  Alert and oriented to person, place, and time.  No facial droop.  No slurred speech.   Skin:  Stage IV sacral decubitus wound status post excisional debridement.  Psychiatric:  Normal mood and affect.  Behavior is normal.      ----------------------------------------------------------------------------------------------------------------------            Results from last 7 days   Lab Units 02/24/23 0132 02/21/23  0150   WBC 10*3/mm3 11.11* 12.20*   HEMOGLOBIN g/dL 11.4* 11.0*   HEMATOCRIT % 38.7 38.4   MCV fL 91.3 92.1   MCHC g/dL 29.5* 28.6*   PLATELETS 10*3/mm3 320 365     Results from last 7 days   Lab Units 02/24/23  0132 02/21/23  0150 02/20/23  0957   SODIUM mmol/L 135* 137 134*   POTASSIUM mmol/L 4.2 3.7 4.0   CHLORIDE mmol/L 97* 100 98   CO2 mmol/L 24.1 23.6 26.5   BUN mg/dL 39* 24* 24*   CREATININE mg/dL 1.01 0.97 0.80   CALCIUM mg/dL 9.9 9.4 9.3   GLUCOSE mg/dL 249* 257* 401*   Estimated Creatinine Clearance: 130.6 mL/min (by C-G formula based on SCr of 1.01 mg/dL).  No results found for: AMMONIA    Glucose   Date/Time Value Ref Range Status   02/24/2023 0616 211 (H) 70 - 130 mg/dL Final     Comment:     Meter: [id number] : [id number] NINA ASIF   02/23/2023 2111 144 (H) 70 - 130 mg/dL Final     Comment:     Meter: [id number] : [id number] MARY JANE RICE   02/23/2023 1618 222 (H) 70 - 130 mg/dL Final     Comment:     Meter: [id number] : [id number] leti montoya    02/23/2023 1123 270 (H) 70 - 130 mg/dL Final     Comment:     Meter: [id number] : [id number] leti jan   02/23/2023 0636 245 (H) 70 - 130 mg/dL Final     Comment:     Meter: [id number] : [id number] MERCEDES JACOBSEN   02/22/2023 2128 218 (H) 70 - 130 mg/dL Final     Comment:     Meter: [id number] : [id number] MARY JANE FUSION   02/22/2023 1625 184 (H) 70 - 130 mg/dL Final     Comment:     Meter: [id number] : [id number] IRMA ALVAKER   02/22/2023 1004 312 (H) 70 - 130 mg/dL Final     Comment:     Meter: [id number] : [id number] DEVIN GALEAS     Lab Results   Component Value Date    HGBA1C 11.50 (H) 02/13/2023     Lab Results   Component Value Date    TSH 3.780 07/19/2022    FREET4 1.31 06/02/2021       Blood Culture   Date Value Ref Range Status   02/12/2023 No growth at 24 hours  Preliminary   02/12/2023 No growth at 24 hours  Preliminary     No results found for: URINECX  No results found for: WOUNDCX  No results found for: STOOLCX  No results found for: RESPCX  Pain Management Panel       Pain Management Panel Latest Ref Rng & Units 6/2/2021 8/19/2018    AMPHETAMINES SCREEN, URINE Negative Negative Negative    BARBITURATES SCREEN Negative Negative Negative    BENZODIAZEPINE SCREEN, URINE Negative Negative Negative    BUPRENORPHINEUR Negative Negative Negative    COCAINE SCREEN, URINE Negative Negative Negative    METHADONE SCREEN, URINE Negative Negative Negative              ----------------------------------------------------------------------------------------------------------------------  Imaging Results (Last 24 Hours)       ** No results found for the last 24 hours. **            -----------------------------------------------------------------------------------  Pertinent Infectious Disease Results     CT abdomen pelvis with contrast on 2/12/2023 showed there is a new decubitus ulcer just to the right of midline at the level of the coccyx with constellation of imaging findings most  characteristic of acute infection/osteomyelitis. Small pocket of air and fluid adjacent to the coccyx measuring 3.7 x 4.1 cm could reflect phlegmon or abscess. Chronic bilateral decubitus ulcers at the level of the ischial tuberosities bilaterally with imaging findings suggestive of chronic infection/osteomyelitis, similar to prior. Hepatic steatosis and hepatomegaly with borderline splenomegaly. Nonobstructing left renal stones. Postoperative changes of left lower quadrant and colostomy and right mid abdominal ileal conduit formation. Diverticulosis. No diverticulitis or bowel obstruction. COVID-19 and flu A/B PCR on 2/12/2023 was negative.    Status post excisional debridement of sacral ulcer on 2/13/2023 with Dr. Taylor.         Assessment/Plan         ASSESSMENT:    Septic shock with lactic acid greater than 4 on admission  Acute and chronic decubitus ulcerations with osteomyelitis and possible abscess      PLAN:    I have seen and examined the patient myself this morning and discussed the case with Christy Caro PA-C and discussed overnight changes with primary RN here are my findings:    The patient is currently sitting up in bed and appears to be comfortable while eating breakfast. No new issues or complaints have been reported. The patient denies experiencing any muscle pain or arthralgias. According to the nurse, the ulcer has been healing well.    The patient's lungs are clear to auscultation bilaterally, indicating that there are no signs of respiratory distress. The abdomen is distended, but soft and nontender, with normal bowel sounds. The patient has a colostomy and is passing soft stools without diarrhea.    The patient is afebrile and their WBC count has improved to 11.11. It is recommended to continue the patient on Rocephin through 3/29/2023 for osteomyelitis.    Code Status:   Code Status and Medical Interventions:   Ordered at: 02/21/23 1034     Code Status (Patient has no pulse and is not  breathing):    CPR (Attempt to Resuscitate)     Medical Interventions (Patient has pulse or is breathing):    Full Support       Christy Caro PA-C  02/24/23  09:24 EST    Electronically signed by Christy Caro PA-C, 02/24/23, 9:26 AM EST.    Electronically signed by Tra Jain MD, 02/24/23, 9:40 AM EST.      Electronically signed by Tra Jain MD at 02/24/23 0941       Consult Notes (most recent note)    No notes of this type exist for this encounter.            Nutrition Notes (most recent note)      Ilana Aceves RD at 02/23/23 0842        Nutrition Services    Patient Name:  Ken Krueger  YOB: 1975  MRN: [id number]  Admit Date:  2/21/2023    BMI: 42.34  Diet: Regular, consistent carb  Supplement Thanh BID  Intake: % x 4 meals  Fluid: not meeting needs    Electronically signed by:  Ilana Aceves RD  02/23/23 08:42 EST     Electronically signed by Ilana Aceves RD at 02/23/23 0843     Discharge Planning Assessment   Fabricio     Patient Name: Ken Krueger               MRN: [id number]  Today's Date: 2/24/2023                     Admit Date: 2/21/2023     Plan: Pt was admitted to swing bed on 2/21/23. Pt lives with his mother and brother and he plans to return home at discharge ([address] [place] in [facility]). Pt utilized [facility] for home health services prior to admission. [facility] [phone] to be contacted at discharge. Pt has a hospital bed, patricio lift, trapeze bar, and wheelchair via [facility] and an electric wheelchair. PCP is Franchesca Hodges. POA is brother, Pineda Krueger. Pt does not have a living will on file. SS to follow.         Discharge Plan

## 2023-02-27 NOTE — PLAN OF CARE
Goal Outcome Evaluation:  Plan of Care Reviewed With: patient        Progress: improving   Pt is in a good mood states he is feeling well. Wound care done per orders. Pt is resting well. PICC line dressing changed per protocol. Will continue to follow plan of care and monitor.

## 2023-02-27 NOTE — PROGRESS NOTES
PROGRESS NOTE         Patient Identification:  Name:  Ken Krueger  Age:  47 y.o.  Sex:  male  :  1975  MRN:  [id number]  Visit Number:  [id number]  Primary Care Provider:  Franchesca Hodges APRN         LOS: 6 days       ----------------------------------------------------------------------------------------------------------------------  Subjective       Chief Complaints:    No chief complaint on file.        Interval History:      The patient is sitting up in bed on room air no apparent distress.  Appears very comfortable while he eats breakfast.  Seems to be in good spirits and denies any complaints at this time.  Lungs are clear to auscultation bilaterally.  Abdomen is distended, but soft, nontender, with normal bowel sounds.  Ostomy with soft stools.  Afebrile, no increased output from ostomy.  Leukocytosis is resolved with a WBC of 10.04.    Review of Systems:    Constitutional: no fever, chills and night sweats.  No fatigue.  Eyes: no eye drainage, itching or redness.  HEENT: no mouth sores, dysphagia or nose bleed.  Respiratory: no for shortness of breath, cough, or production of sputum.  Cardiovascular: no chest pain, no palpitations, no orthopnea.  Gastrointestinal: no nausea, vomiting or diarrhea. No abdominal pain, hematemesis or rectal bleeding.  Genitourinary: no dysuria or polyuria.  Hematologic/lymphatic: no lymph node abnormalities, no easy bruising or easy bleeding.  Musculoskeletal: No muscle or joint pain.  Skin: No rash and no itching.  Decubitus ulcers.  Neurological: no loss of consciousness, no seizure, no headache.  Behavioral/Psych: no depression or suicidal ideation.  Endocrine: no hot flashes.  Immunologic: negative.    ----------------------------------------------------------------------------------------------------------------------      Objective       Current Hospital Meds:    Pharmacy Consult - Pharmacy to dose,        ----------------------------------------------------------------------------------------------------------------------    Vital Signs:  Temp:  [97.4 °F (36.3 °C)-97.6 °F (36.4 °C)] 97.6 °F (36.4 °C)  Heart Rate:  [87-92] 92  Resp:  [18-20] 20  BP: (106-146)/(66-78) 106/66  No data found.  SpO2 Percentage    02/25/23 1900 02/26/23 0658 02/27/23 0700   SpO2: 98% 97% 99%     SpO2:  [99 %] 99 %  on   ;   Device (Oxygen Therapy): CPAP;room air    Body mass index is 42.34 kg/m².  Wt Readings from Last 3 Encounters:   02/21/23 (!) 138 kg (303 lb 9.2 oz)   02/20/23 (!) 139 kg (306 lb 14.1 oz)   12/13/22 (!) 141 kg (310 lb 14.4 oz)        Intake/Output Summary (Last 24 hours) at 2/27/2023 0843  Last data filed at 2/27/2023 0816  Gross per 24 hour   Intake 1220 ml   Output 5600 ml   Net -4380 ml     Diet: Diabetic Diets; Consistent Carbohydrate; Texture: Regular Texture (IDDSI 7); Fluid Consistency: Thin (IDDSI 0)  ----------------------------------------------------------------------------------------------------------------------      Physical Exam:    Constitutional: Very pleasant obese male is sitting up in bed on room air in no apparent distress.  Eating breakfast and appears comfortable.    HENT:  Head: Normocephalic and atraumatic.  Mouth:  Moist mucous membranes.    Eyes:  Conjunctivae and EOM are normal.  No scleral icterus.  Neck:  Neck supple.  No JVD present.    Cardiovascular:  Normal rate, regular rhythm and normal heart sounds with no murmur. No edema.  Pulmonary/Chest:  No respiratory distress, no wheezes, no crackles, with normal breath sounds and good air movement.  Lungs clear to auscultation bilaterally  Abdominal: Obese.  Soft.  Bowel sounds are normal.  No distension and no tenderness.  Ileal conduit in place.  Colostomy bag in place with soft stool.  Musculoskeletal:  No tenderness.  No swelling or redness of joints.  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  1+ edema of right lower extremity.  PICC to right upper  arm with no signs of infection.  Neurological:  Alert and oriented to person, place, and time.  No facial droop.  No slurred speech.   Skin:  Stage IV sacral decubitus wound status post excisional debridement.  Psychiatric:  Normal mood and affect.  Behavior is normal.      ----------------------------------------------------------------------------------------------------------------------            Results from last 7 days   Lab Units 02/27/23 0156 02/24/23 0132 02/21/23  0150   WBC 10*3/mm3 10.04 11.11* 12.20*   HEMOGLOBIN g/dL 11.4* 11.4* 11.0*   HEMATOCRIT % 39.4 38.7 38.4   MCV fL 92.7 91.3 92.1   MCHC g/dL 28.9* 29.5* 28.6*   PLATELETS 10*3/mm3 276 320 365     Results from last 7 days   Lab Units 02/27/23 0156 02/24/23 0132 02/21/23  0150   SODIUM mmol/L 136 135* 137   POTASSIUM mmol/L 4.4 4.2 3.7   CHLORIDE mmol/L 101 97* 100   CO2 mmol/L 23.7 24.1 23.6   BUN mg/dL 38* 39* 24*   CREATININE mg/dL 0.88 1.01 0.97   CALCIUM mg/dL 9.9 9.9 9.4   GLUCOSE mg/dL 185* 249* 257*   ALBUMIN g/dL 3.3*  --   --    BILIRUBIN mg/dL 0.2  --   --    ALK PHOS U/L 117  --   --    AST (SGOT) U/L 29  --   --    ALT (SGPT) U/L 35  --   --    Estimated Creatinine Clearance: 149.9 mL/min (by C-G formula based on SCr of 0.88 mg/dL).  No results found for: AMMONIA    Glucose   Date/Time Value Ref Range Status   02/27/2023 0638 181 (H) 70 - 130 mg/dL Final     Comment:     Meter: [id number] : [id number] NINA ASIF   02/26/2023 2219 150 (H) 70 - 130 mg/dL Final     Comment:     Meter: [id number] : [id number] WILBER ELZA   02/26/2023 1649 139 (H) 70 - 130 mg/dL Final     Comment:     Meter: [id number] : [id number] BERT SALOMON   02/26/2023 1157 181 (H) 70 - 130 mg/dL Final     Comment:     Meter: [id number] : [id number] BERT SIMPSONCK   02/26/2023 0638 191 (H) 70 - 130 mg/dL Final     Comment:     Meter: [id number] : [id number] NINA ASIF   02/25/2023 2022 136 (H) 70 - 130 mg/dL Final     Comment:      Meter: [id number] : [id number] ARTURO CORTEZ   02/25/2023 1733 162 (H) 70 - 130 mg/dL Final     Comment:     Meter: [id number] : [id number] BERT SALOMON   02/25/2023 1039 213 (H) 70 - 130 mg/dL Final     Comment:     Meter: [id number] : [id number] [facility]     Lab Results   Component Value Date    HGBA1C 11.50 (H) 02/13/2023     Lab Results   Component Value Date    TSH 3.780 07/19/2022    FREET4 1.31 06/02/2021       Blood Culture   Date Value Ref Range Status   02/12/2023 No growth at 24 hours  Preliminary   02/12/2023 No growth at 24 hours  Preliminary     No results found for: URINECX  No results found for: WOUNDCX  No results found for: STOOLCX  No results found for: RESPCX  Pain Management Panel       Pain Management Panel Latest Ref Rng & Units 6/2/2021 8/19/2018    AMPHETAMINES SCREEN, URINE Negative Negative Negative    BARBITURATES SCREEN Negative Negative Negative    BENZODIAZEPINE SCREEN, URINE Negative Negative Negative    BUPRENORPHINEUR Negative Negative Negative    COCAINE SCREEN, URINE Negative Negative Negative    METHADONE SCREEN, URINE Negative Negative Negative              ----------------------------------------------------------------------------------------------------------------------  Imaging Results (Last 24 Hours)       ** No results found for the last 24 hours. **            -----------------------------------------------------------------------------------  Pertinent Infectious Disease Results     CT abdomen pelvis with contrast on 2/12/2023 showed there is a new decubitus ulcer just to the right of midline at the level of the coccyx with constellation of imaging findings most characteristic of acute infection/osteomyelitis. Small pocket of air and fluid adjacent to the coccyx measuring 3.7 x 4.1 cm could reflect phlegmon or abscess. Chronic bilateral decubitus ulcers at the level of the ischial tuberosities bilaterally with imaging findings suggestive of chronic  infection/osteomyelitis, similar to prior. Hepatic steatosis and hepatomegaly with borderline splenomegaly. Nonobstructing left renal stones. Postoperative changes of left lower quadrant and colostomy and right mid abdominal ileal conduit formation. Diverticulosis. No diverticulitis or bowel obstruction. COVID-19 and flu A/B PCR on 2/12/2023 was negative.    Status post excisional debridement of sacral ulcer on 2/13/2023 with Dr. Taylor.         Assessment/Plan         ASSESSMENT:    Septic shock with lactic acid greater than 4 on admission  Acute and chronic decubitus ulcerations with osteomyelitis and possible abscess      PLAN:    I have seen and examined the patient myself this morning with Christy Caro PA-C and pharmacist Esther and discussed overnight changes with primary RN here are my findings:    The patient is sitting up in bed on room air no apparent distress.  Appears very comfortable while he eats breakfast.    On exam, patient seems to be in good spirits and denies any complaints at this time.  Lungs are clear to auscultation bilaterally.  Abdomen is distended, but soft, nontender, with normal bowel sounds.  Ostomy with soft stools.  Afebrile, no increased output from ostomy.    Lab wise, leukocytosis is resolved with a WBC of 10.04.    Overall patient is showing improvement from ID standpoint and for now recommend Ceftriaxone 2 g IV every 24 hours through 3/29/2023 for osteomyelitis as tissue cultures finalized as E. coli.    Code Status:   Code Status and Medical Interventions:   Ordered at: 02/21/23 1034     Code Status (Patient has no pulse and is not breathing):    CPR (Attempt to Resuscitate)     Medical Interventions (Patient has pulse or is breathing):    Full Support       Christy Caro PA-C  02/27/23  08:43 EST    Electronically signed by Christy Caro PA-C, 02/27/23, 8:45 AM EST.    Electronically signed by Tra Jain MD, 02/27/23, 10:59 AM EST.

## 2023-02-28 LAB
GLUCOSE BLDC GLUCOMTR-MCNC: 161 MG/DL (ref 70–130)
GLUCOSE BLDC GLUCOMTR-MCNC: 174 MG/DL (ref 70–130)
GLUCOSE BLDC GLUCOMTR-MCNC: 176 MG/DL (ref 70–130)
GLUCOSE BLDC GLUCOMTR-MCNC: 212 MG/DL (ref 70–130)

## 2023-02-28 PROCEDURE — 25010000002 CEFTRIAXONE PER 250 MG: Performed by: STUDENT IN AN ORGANIZED HEALTH CARE EDUCATION/TRAINING PROGRAM

## 2023-02-28 PROCEDURE — 63710000001 INSULIN DETEMIR PER 5 UNITS: Performed by: STUDENT IN AN ORGANIZED HEALTH CARE EDUCATION/TRAINING PROGRAM

## 2023-02-28 PROCEDURE — 99308 SBSQ NF CARE LOW MDM 20: CPT | Performed by: PHYSICIAN ASSISTANT

## 2023-02-28 PROCEDURE — 82962 GLUCOSE BLOOD TEST: CPT

## 2023-02-28 PROCEDURE — 63710000001 INSULIN ASPART PER 5 UNITS: Performed by: STUDENT IN AN ORGANIZED HEALTH CARE EDUCATION/TRAINING PROGRAM

## 2023-02-28 RX ADMIN — Medication 10 ML: at 21:30

## 2023-02-28 RX ADMIN — VERICIGUAT 2.5 MG: 2.5 TABLET, FILM COATED ORAL at 09:35

## 2023-02-28 RX ADMIN — BACLOFEN 30 MG: 10 TABLET ORAL at 08:10

## 2023-02-28 RX ADMIN — GABAPENTIN 800 MG: 400 CAPSULE ORAL at 08:09

## 2023-02-28 RX ADMIN — Medication 1 TABLET: at 08:08

## 2023-02-28 RX ADMIN — Medication 10 ML: at 08:13

## 2023-02-28 RX ADMIN — VITAMINS A AND D OINTMENT 1 APPLICATION: 15.5; 53.4 OINTMENT TOPICAL at 08:12

## 2023-02-28 RX ADMIN — INSULIN ASPART 2 UNITS: 100 INJECTION, SOLUTION INTRAVENOUS; SUBCUTANEOUS at 08:05

## 2023-02-28 RX ADMIN — DULOXETINE HYDROCHLORIDE 60 MG: 60 CAPSULE, DELAYED RELEASE ORAL at 21:29

## 2023-02-28 RX ADMIN — INSULIN DETEMIR 55 UNITS: 100 INJECTION, SOLUTION SUBCUTANEOUS at 08:12

## 2023-02-28 RX ADMIN — NYSTATIN: 100000 POWDER TOPICAL at 21:29

## 2023-02-28 RX ADMIN — DICYCLOMINE HYDROCHLORIDE 10 MG: 10 CAPSULE ORAL at 08:10

## 2023-02-28 RX ADMIN — GABAPENTIN 800 MG: 400 CAPSULE ORAL at 21:43

## 2023-02-28 RX ADMIN — METFORMIN HYDROCHLORIDE 1000 MG: 500 TABLET ORAL at 08:10

## 2023-02-28 RX ADMIN — EMPAGLIFLOZIN 10 MG: 10 TABLET, FILM COATED ORAL at 08:07

## 2023-02-28 RX ADMIN — FERROUS SULFATE TAB 325 MG (65 MG ELEMENTAL FE) 325 MG: 325 (65 FE) TAB at 08:09

## 2023-02-28 RX ADMIN — SPIRONOLACTONE 25 MG: 25 TABLET ORAL at 08:09

## 2023-02-28 RX ADMIN — METFORMIN HYDROCHLORIDE 1000 MG: 500 TABLET ORAL at 17:29

## 2023-02-28 RX ADMIN — BACLOFEN 30 MG: 10 TABLET ORAL at 17:29

## 2023-02-28 RX ADMIN — Medication 1 CAPSULE: at 08:08

## 2023-02-28 RX ADMIN — MAGNESIUM GLUCONATE 500 MG ORAL TABLET 1200 MG: 500 TABLET ORAL at 08:07

## 2023-02-28 RX ADMIN — BACLOFEN 30 MG: 10 TABLET ORAL at 21:28

## 2023-02-28 RX ADMIN — Medication 1000 UNITS: at 08:11

## 2023-02-28 RX ADMIN — PANTOPRAZOLE SODIUM 40 MG: 40 TABLET, DELAYED RELEASE ORAL at 21:28

## 2023-02-28 RX ADMIN — INSULIN ASPART 30 UNITS: 100 INJECTION, SOLUTION INTRAVENOUS; SUBCUTANEOUS at 11:28

## 2023-02-28 RX ADMIN — CARVEDILOL 12.5 MG: 6.25 TABLET, FILM COATED ORAL at 17:29

## 2023-02-28 RX ADMIN — APIXABAN 5 MG: 5 TABLET, FILM COATED ORAL at 21:28

## 2023-02-28 RX ADMIN — APIXABAN 5 MG: 5 TABLET, FILM COATED ORAL at 08:06

## 2023-02-28 RX ADMIN — GLIPIZIDE 5 MG: 5 TABLET ORAL at 16:33

## 2023-02-28 RX ADMIN — OXYCODONE HYDROCHLORIDE AND ACETAMINOPHEN 500 MG: 500 TABLET ORAL at 08:10

## 2023-02-28 RX ADMIN — MODAFINIL 200 MG: 100 TABLET ORAL at 09:34

## 2023-02-28 RX ADMIN — ATORVASTATIN CALCIUM 10 MG: 10 TABLET, FILM COATED ORAL at 21:28

## 2023-02-28 RX ADMIN — GLIPIZIDE 5 MG: 5 TABLET ORAL at 08:08

## 2023-02-28 RX ADMIN — INSULIN DETEMIR 55 UNITS: 100 INJECTION, SOLUTION SUBCUTANEOUS at 21:29

## 2023-02-28 RX ADMIN — SUCRALFATE 1 G: 1 TABLET ORAL at 08:06

## 2023-02-28 RX ADMIN — ARIPIPRAZOLE 10 MG: 10 TABLET ORAL at 21:28

## 2023-02-28 RX ADMIN — NYSTATIN: 100000 POWDER TOPICAL at 08:13

## 2023-02-28 RX ADMIN — DICYCLOMINE HYDROCHLORIDE 10 MG: 10 CAPSULE ORAL at 21:29

## 2023-02-28 RX ADMIN — BUMETANIDE 2 MG: 1 TABLET ORAL at 08:07

## 2023-02-28 RX ADMIN — DULOXETINE HYDROCHLORIDE 60 MG: 60 CAPSULE, DELAYED RELEASE ORAL at 08:11

## 2023-02-28 RX ADMIN — SACUBITRIL AND VALSARTAN 1 TABLET: 24; 26 TABLET, FILM COATED ORAL at 21:28

## 2023-02-28 RX ADMIN — CARVEDILOL 12.5 MG: 6.25 TABLET, FILM COATED ORAL at 08:08

## 2023-02-28 RX ADMIN — CEFTRIAXONE 2 G: 2 INJECTION, POWDER, FOR SOLUTION INTRAMUSCULAR; INTRAVENOUS at 09:00

## 2023-02-28 RX ADMIN — GABAPENTIN 800 MG: 400 CAPSULE ORAL at 16:33

## 2023-02-28 RX ADMIN — PANTOPRAZOLE SODIUM 40 MG: 40 TABLET, DELAYED RELEASE ORAL at 08:09

## 2023-02-28 RX ADMIN — INSULIN ASPART 30 UNITS: 100 INJECTION, SOLUTION INTRAVENOUS; SUBCUTANEOUS at 17:30

## 2023-02-28 RX ADMIN — VITAMINS A AND D OINTMENT 1 APPLICATION: 15.5; 53.4 OINTMENT TOPICAL at 21:29

## 2023-02-28 RX ADMIN — INSULIN ASPART 4 UNITS: 100 INJECTION, SOLUTION INTRAVENOUS; SUBCUTANEOUS at 11:26

## 2023-02-28 RX ADMIN — INSULIN ASPART 2 UNITS: 100 INJECTION, SOLUTION INTRAVENOUS; SUBCUTANEOUS at 17:29

## 2023-02-28 RX ADMIN — BACLOFEN 30 MG: 10 TABLET ORAL at 11:26

## 2023-02-28 NOTE — PROGRESS NOTES
PROGRESS NOTE         Patient Identification:  Name:  Ken Krueger  Age:  47 y.o.  Sex:  male  :  1975  MRN:  [id number]  Visit Number:  [id number]  Primary Care Provider:  Franchesca Hodges APRN         LOS: 7 days       ----------------------------------------------------------------------------------------------------------------------  Subjective       Chief Complaints:    No chief complaint on file.        Interval History:      The patient is sitting up in bed on CPAP in no apparent distress.  Reports that he is feeling well today and has no new issues or complaints at this time.  Lungs are clear to auscultation bilaterally.  Abdomen is distended, but soft, nontender, with normal bowel sounds.  Ostomy with soft stools.  Afebrile, no increased output from ostomy.  No new labs today.  No new imaging today.    Review of Systems:    Constitutional: no fever, chills and night sweats.  No fatigue.  Eyes: no eye drainage, itching or redness.  HEENT: no mouth sores, dysphagia or nose bleed.  Respiratory: no for shortness of breath, cough, or production of sputum.  Cardiovascular: no chest pain, no palpitations, no orthopnea.  Gastrointestinal: no nausea, vomiting or diarrhea. No abdominal pain, hematemesis or rectal bleeding.  Genitourinary: no dysuria or polyuria.  Hematologic/lymphatic: no lymph node abnormalities, no easy bruising or easy bleeding.  Musculoskeletal: No muscle or joint pain.  Skin: No rash and no itching.  Decubitus ulcers.  Neurological: no loss of consciousness, no seizure, no headache.  Behavioral/Psych: no depression or suicidal ideation.  Endocrine: no hot flashes.  Immunologic: negative.    ----------------------------------------------------------------------------------------------------------------------      Objective       Current [facility] Meds:    Pharmacy Consult - Pharmacy to dose,        ----------------------------------------------------------------------------------------------------------------------    Vital Signs:  Temp:  [97.4 °F (36.3 °C)-97.7 °F (36.5 °C)] 97.7 °F (36.5 °C)  Heart Rate:  [80-85] 80  Resp:  [16-18] 16  BP: (109-145)/(58-75) 109/58  No data found.  SpO2 Percentage    02/26/23 0658 02/27/23 0700 02/28/23 0700   SpO2: 97% 99% 99%     SpO2:  [99 %] 99 %  on   ;   Device (Oxygen Therapy): CPAP    Body mass index is 42.34 kg/m².  Wt Readings from Last 3 Encounters:   02/21/23 (!) 138 kg (303 lb 9.2 oz)   02/20/23 (!) 139 kg (306 lb 14.1 oz)   12/13/22 (!) 141 kg (310 lb 14.4 oz)        Intake/Output Summary (Last 24 hours) at 2/28/2023 0907  Last data filed at 2/28/2023 0834  Gross per 24 hour   Intake 4890 ml   Output 6325 ml   Net -1435 ml     Diet: Diabetic Diets; Consistent Carbohydrate; Texture: Regular Texture (IDDSI 7); Fluid Consistency: Thin (IDDSI 0)  ----------------------------------------------------------------------------------------------------------------------      Physical Exam:    Constitutional: Very pleasant obese male is sitting up in bed on CPAP in no apparent distress.  HENT:  Head: Normocephalic and atraumatic.  Mouth:  Moist mucous membranes.    Eyes:  Conjunctivae and EOM are normal.  No scleral icterus.  Neck:  Neck supple.  No JVD present.    Cardiovascular:  Normal rate, regular rhythm and normal heart sounds with no murmur. No edema.  Pulmonary/Chest:  No respiratory distress, no wheezes, no crackles, with normal breath sounds and good air movement.  Lungs clear to auscultation bilaterally  Abdominal: Obese.  Soft.  Bowel sounds are normal.  No distension and no tenderness.  Ileal conduit in place.  Colostomy bag in place with soft stool.  Musculoskeletal:  No tenderness.  No swelling or redness of joints.  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  1+ edema of right lower extremity.  PICC to right upper arm with no signs of infection.  Neurological:  Alert and  oriented to person, place, and time.  No facial droop.  No slurred speech.   Skin:  Stage IV sacral decubitus wound status post excisional debridement.  Psychiatric:  Normal mood and affect.  Behavior is normal.      ----------------------------------------------------------------------------------------------------------------------            Results from last 7 days   Lab Units 02/27/23  0156 02/24/23  0132   WBC 10*3/mm3 10.04 11.11*   HEMOGLOBIN g/dL 11.4* 11.4*   HEMATOCRIT % 39.4 38.7   MCV fL 92.7 91.3   MCHC g/dL 28.9* 29.5*   PLATELETS 10*3/mm3 276 320     Results from last 7 days   Lab Units 02/27/23  0156 02/24/23  0132   SODIUM mmol/L 136 135*   POTASSIUM mmol/L 4.4 4.2   CHLORIDE mmol/L 101 97*   CO2 mmol/L 23.7 24.1   BUN mg/dL 38* 39*   CREATININE mg/dL 0.88 1.01   CALCIUM mg/dL 9.9 9.9   GLUCOSE mg/dL 185* 249*   ALBUMIN g/dL 3.3*  --    BILIRUBIN mg/dL 0.2  --    ALK PHOS U/L 117  --    AST (SGOT) U/L 29  --    ALT (SGPT) U/L 35  --    Estimated Creatinine Clearance: 149.9 mL/min (by C-G formula based on SCr of 0.88 mg/dL).  No results found for: AMMONIA    Glucose   Date/Time Value Ref Range Status   02/28/2023 0643 161 (H) 70 - 130 mg/dL Final     Comment:     Meter: [id number] : [id number] NINA ASIF   02/27/2023 2207 149 (H) 70 - 130 mg/dL Final     Comment:     Meter: [id number] : [id number] DOMINICK CAZARES   02/27/2023 1607 192 (H) 70 - 130 mg/dL Final     Comment:     Meter: [id number] : [id number] Gemma Loveney   02/27/2023 1024 260 (H) 70 - 130 mg/dL Final     Comment:     Meter: [id number] : [id number] Gemma Loveney   02/27/2023 0638 181 (H) 70 - 130 mg/dL Final     Comment:     Meter: [id number] : [id number] NINA ASIF   02/26/2023 2219 150 (H) 70 - 130 mg/dL Final     Comment:     Meter: [id number] : [id number] WILBER BERTRAND   02/26/2023 1649 139 (H) 70 - 130 mg/dL Final     Comment:     Meter: [id number] : [id number] BERT SALOMON   02/26/2023 1157  181 (H) 70 - 130 mg/dL Final     Comment:     Meter: [id number] : [id number] [facility]     Lab Results   Component Value Date    HGBA1C 11.50 (H) 02/13/2023     Lab Results   Component Value Date    TSH 3.780 07/19/2022    FREET4 1.31 06/02/2021       Blood Culture   Date Value Ref Range Status   02/12/2023 No growth at 24 hours  Preliminary   02/12/2023 No growth at 24 hours  Preliminary     No results found for: URINECX  No results found for: WOUNDCX  No results found for: STOOLCX  No results found for: RESPCX  Pain Management Panel       Pain Management Panel Latest Ref Rng & Units 6/2/2021 8/19/2018    AMPHETAMINES SCREEN, URINE Negative Negative Negative    BARBITURATES SCREEN Negative Negative Negative    BENZODIAZEPINE SCREEN, URINE Negative Negative Negative    BUPRENORPHINEUR Negative Negative Negative    COCAINE SCREEN, URINE Negative Negative Negative    METHADONE SCREEN, URINE Negative Negative Negative              ----------------------------------------------------------------------------------------------------------------------  Imaging Results (Last 24 Hours)       ** No results found for the last 24 hours. **            -----------------------------------------------------------------------------------  Pertinent Infectious Disease Results     CT abdomen pelvis with contrast on 2/12/2023 showed there is a new decubitus ulcer just to the right of midline at the level of the coccyx with constellation of imaging findings most characteristic of acute infection/osteomyelitis. Small pocket of air and fluid adjacent to the coccyx measuring 3.7 x 4.1 cm could reflect phlegmon or abscess. Chronic bilateral decubitus ulcers at the level of the ischial tuberosities bilaterally with imaging findings suggestive of chronic infection/osteomyelitis, similar to prior. Hepatic steatosis and hepatomegaly with borderline splenomegaly. Nonobstructing left renal stones. Postoperative changes of left lower  quadrant and colostomy and right mid abdominal ileal conduit formation. Diverticulosis. No diverticulitis or bowel obstruction. COVID-19 and flu A/B PCR on 2/12/2023 was negative.    Status post excisional debridement of sacral ulcer on 2/13/2023 with Dr. Taylor.         Assessment/Plan         ASSESSMENT:    Septic shock with lactic acid greater than 4 on admission  Acute and chronic decubitus ulcerations with osteomyelitis and possible abscess      PLAN:    I examined the patient this morning and he is sitting up in bed on CPAP in no apparent distress.  Reports that he is feeling well today and has no new issues or complaints at this time.  Lungs are clear to auscultation bilaterally.  Abdomen is distended, but soft, nontender, with normal bowel sounds.  Ostomy with soft stools.  Afebrile, no increased output from ostomy.  No new labs today.  No new imaging today.    Recommend to continue on ceftriaxone 2 g IV every 24 hours through 3/29/2023 for osteomyelitis his tissue cultures finalized as E. coli.  Overall, patient seems to be showing improvement daily from an ID standpoint.    Code Status:   Code Status and Medical Interventions:   Ordered at: 02/21/23 1034     Code Status (Patient has no pulse and is not breathing):    CPR (Attempt to Resuscitate)     Medical Interventions (Patient has pulse or is breathing):    Full Support       Christy Caro PA-C  02/28/23  09:07 EST

## 2023-02-28 NOTE — PLAN OF CARE
Goal Outcome Evaluation:           Progress: no change  Outcome Evaluation: Pt. resting in bed at this time. Pt. has no complaints or acute changes at this time. Wound care completed. Will continue with plan of care.

## 2023-02-28 NOTE — PROGRESS NOTES
Antimicrobial Length of Therapy:    Ceftriaxone day 14 will continue until 3/29.     Thank you,   Esther Vick, Pharm.D.   Pharmacy Resident   2/28/2023  14:39 EST

## 2023-02-28 NOTE — NURSING NOTE
Wound consult to place wound vac To RT gluteal. Applied thin layer of Thera honey to base and used 2 pieces of black foam @ 125. Will change dressing on Friday.

## 2023-02-28 NOTE — PLAN OF CARE
Goal Outcome Evaluation:  Plan of Care Reviewed With: patient        Progress: no change  Outcome Evaluation: patient has rested in bed this shift, wound care completed per order, no requests or complaints att his thime. no distress noted will conitnue POC.
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
## 2023-03-01 LAB
GLUCOSE BLDC GLUCOMTR-MCNC: 188 MG/DL (ref 70–130)
GLUCOSE BLDC GLUCOMTR-MCNC: 225 MG/DL (ref 70–130)
GLUCOSE BLDC GLUCOMTR-MCNC: 312 MG/DL (ref 70–130)
GLUCOSE BLDC GLUCOMTR-MCNC: 329 MG/DL (ref 70–130)

## 2023-03-01 PROCEDURE — 82962 GLUCOSE BLOOD TEST: CPT

## 2023-03-01 PROCEDURE — 25010000002 CEFTRIAXONE PER 250 MG: Performed by: STUDENT IN AN ORGANIZED HEALTH CARE EDUCATION/TRAINING PROGRAM

## 2023-03-01 PROCEDURE — 63710000001 INSULIN ASPART PER 5 UNITS: Performed by: STUDENT IN AN ORGANIZED HEALTH CARE EDUCATION/TRAINING PROGRAM

## 2023-03-01 PROCEDURE — 63710000001 INSULIN DETEMIR PER 5 UNITS: Performed by: STUDENT IN AN ORGANIZED HEALTH CARE EDUCATION/TRAINING PROGRAM

## 2023-03-01 RX ADMIN — NYSTATIN: 100000 POWDER TOPICAL at 20:17

## 2023-03-01 RX ADMIN — PANTOPRAZOLE SODIUM 40 MG: 40 TABLET, DELAYED RELEASE ORAL at 08:43

## 2023-03-01 RX ADMIN — DULOXETINE HYDROCHLORIDE 60 MG: 60 CAPSULE, DELAYED RELEASE ORAL at 08:43

## 2023-03-01 RX ADMIN — Medication 1000 UNITS: at 08:44

## 2023-03-01 RX ADMIN — BACLOFEN 30 MG: 10 TABLET ORAL at 08:42

## 2023-03-01 RX ADMIN — VITAMINS A AND D OINTMENT 1 APPLICATION: 15.5; 53.4 OINTMENT TOPICAL at 08:41

## 2023-03-01 RX ADMIN — Medication 1 TABLET: at 08:43

## 2023-03-01 RX ADMIN — GABAPENTIN 800 MG: 400 CAPSULE ORAL at 15:36

## 2023-03-01 RX ADMIN — VERICIGUAT 2.5 MG: 2.5 TABLET, FILM COATED ORAL at 08:45

## 2023-03-01 RX ADMIN — BACLOFEN 30 MG: 10 TABLET ORAL at 20:16

## 2023-03-01 RX ADMIN — GLIPIZIDE 5 MG: 5 TABLET ORAL at 08:42

## 2023-03-01 RX ADMIN — PANTOPRAZOLE SODIUM 40 MG: 40 TABLET, DELAYED RELEASE ORAL at 20:16

## 2023-03-01 RX ADMIN — SACUBITRIL AND VALSARTAN 1 TABLET: 24; 26 TABLET, FILM COATED ORAL at 20:16

## 2023-03-01 RX ADMIN — Medication 10 ML: at 08:46

## 2023-03-01 RX ADMIN — SPIRONOLACTONE 25 MG: 25 TABLET ORAL at 08:43

## 2023-03-01 RX ADMIN — CEFTRIAXONE 2 G: 2 INJECTION, POWDER, FOR SOLUTION INTRAMUSCULAR; INTRAVENOUS at 08:40

## 2023-03-01 RX ADMIN — CARVEDILOL 12.5 MG: 6.25 TABLET, FILM COATED ORAL at 17:24

## 2023-03-01 RX ADMIN — APIXABAN 5 MG: 5 TABLET, FILM COATED ORAL at 08:43

## 2023-03-01 RX ADMIN — ARIPIPRAZOLE 10 MG: 10 TABLET ORAL at 20:16

## 2023-03-01 RX ADMIN — GABAPENTIN 800 MG: 400 CAPSULE ORAL at 08:41

## 2023-03-01 RX ADMIN — INSULIN ASPART 30 UNITS: 100 INJECTION, SOLUTION INTRAVENOUS; SUBCUTANEOUS at 08:41

## 2023-03-01 RX ADMIN — ATORVASTATIN CALCIUM 10 MG: 10 TABLET, FILM COATED ORAL at 20:16

## 2023-03-01 RX ADMIN — OXYCODONE HYDROCHLORIDE AND ACETAMINOPHEN 500 MG: 500 TABLET ORAL at 08:43

## 2023-03-01 RX ADMIN — SACUBITRIL AND VALSARTAN 1 TABLET: 24; 26 TABLET, FILM COATED ORAL at 08:44

## 2023-03-01 RX ADMIN — GLIPIZIDE 5 MG: 5 TABLET ORAL at 17:24

## 2023-03-01 RX ADMIN — INSULIN ASPART 30 UNITS: 100 INJECTION, SOLUTION INTRAVENOUS; SUBCUTANEOUS at 17:23

## 2023-03-01 RX ADMIN — EMPAGLIFLOZIN 10 MG: 10 TABLET, FILM COATED ORAL at 08:43

## 2023-03-01 RX ADMIN — DICYCLOMINE HYDROCHLORIDE 10 MG: 10 CAPSULE ORAL at 08:43

## 2023-03-01 RX ADMIN — APIXABAN 5 MG: 5 TABLET, FILM COATED ORAL at 20:16

## 2023-03-01 RX ADMIN — DULOXETINE HYDROCHLORIDE 60 MG: 60 CAPSULE, DELAYED RELEASE ORAL at 20:16

## 2023-03-01 RX ADMIN — INSULIN DETEMIR 55 UNITS: 100 INJECTION, SOLUTION SUBCUTANEOUS at 08:41

## 2023-03-01 RX ADMIN — INSULIN ASPART 30 UNITS: 100 INJECTION, SOLUTION INTRAVENOUS; SUBCUTANEOUS at 11:39

## 2023-03-01 RX ADMIN — DICYCLOMINE HYDROCHLORIDE 10 MG: 10 CAPSULE ORAL at 20:16

## 2023-03-01 RX ADMIN — METFORMIN HYDROCHLORIDE 1000 MG: 500 TABLET ORAL at 17:24

## 2023-03-01 RX ADMIN — GABAPENTIN 800 MG: 400 CAPSULE ORAL at 20:16

## 2023-03-01 RX ADMIN — INSULIN ASPART 7 UNITS: 100 INJECTION, SOLUTION INTRAVENOUS; SUBCUTANEOUS at 17:23

## 2023-03-01 RX ADMIN — CARVEDILOL 12.5 MG: 6.25 TABLET, FILM COATED ORAL at 08:42

## 2023-03-01 RX ADMIN — Medication 10 ML: at 20:16

## 2023-03-01 RX ADMIN — FERROUS SULFATE TAB 325 MG (65 MG ELEMENTAL FE) 325 MG: 325 (65 FE) TAB at 08:43

## 2023-03-01 RX ADMIN — INSULIN DETEMIR 55 UNITS: 100 INJECTION, SOLUTION SUBCUTANEOUS at 20:15

## 2023-03-01 RX ADMIN — VITAMINS A AND D OINTMENT 1 APPLICATION: 15.5; 53.4 OINTMENT TOPICAL at 20:16

## 2023-03-01 RX ADMIN — NYSTATIN: 100000 POWDER TOPICAL at 08:40

## 2023-03-01 RX ADMIN — Medication 1 CAPSULE: at 08:43

## 2023-03-01 RX ADMIN — MAGNESIUM GLUCONATE 500 MG ORAL TABLET 1200 MG: 500 TABLET ORAL at 08:43

## 2023-03-01 RX ADMIN — BUMETANIDE 2 MG: 1 TABLET ORAL at 08:42

## 2023-03-01 RX ADMIN — BACLOFEN 30 MG: 10 TABLET ORAL at 11:38

## 2023-03-01 RX ADMIN — SUCRALFATE 1 G: 1 TABLET ORAL at 08:42

## 2023-03-01 RX ADMIN — MODAFINIL 200 MG: 100 TABLET ORAL at 08:48

## 2023-03-01 RX ADMIN — METFORMIN HYDROCHLORIDE 1000 MG: 500 TABLET ORAL at 08:44

## 2023-03-01 RX ADMIN — BACLOFEN 30 MG: 10 TABLET ORAL at 17:24

## 2023-03-01 RX ADMIN — INSULIN ASPART 4 UNITS: 100 INJECTION, SOLUTION INTRAVENOUS; SUBCUTANEOUS at 08:41

## 2023-03-01 RX ADMIN — INSULIN ASPART 7 UNITS: 100 INJECTION, SOLUTION INTRAVENOUS; SUBCUTANEOUS at 11:38

## 2023-03-01 NOTE — CASE MANAGEMENT/SOCIAL WORK
Discharge Planning Assessment   Fabricio     Patient Name: Ken Krueger  MRN: [id number]  Today's Date: 3/1/2023    Admit Date: 2/21/2023    Plan: Pt was admitted to swing bed on 2/21/23. SS spoke with Swingbed RN who tates he submitted clinical updates for continues stay and is awaiting an update from pt's insurance. SS to follow.   Discharge Plan     Row Name 03/01/23 1125       Plan    Plan Pt was admitted to swing bed on 2/21/23. Pt has been approved for additional swing bed days with clinical updates for continued states with review on 3/6/23. SS spoke with Swingbed RN who tates he submitted clinical updates for continues stay and is awaiting an update from pt's insurance. SS to follow.                HUMPHREY Loya

## 2023-03-01 NOTE — PLAN OF CARE
Goal Outcome Evaluation:  Plan of Care Reviewed With: patient        Progress: no change  Outcome Evaluation: Pt is currently resting in bed at this time. Wound care done per order. Both ports on line were clogged, alteplase was used to unclog both ports. Blood return noted, alteplase/blood wasted. Flushing currently. No acute changes at this time

## 2023-03-01 NOTE — CASE MANAGEMENT/SOCIAL WORK
Patient approved for 7 additional swing bed days with continued stay review due 3/7/23. Provider and SS notified via secure chat.

## 2023-03-01 NOTE — PLAN OF CARE
Goal Outcome Evaluation:  Plan of Care Reviewed With: patient        Progress: no change  Outcome Evaluation: Pt's resting in bed at this time, A&O, stable on RA, Bipap at night. Woundvac in use. Pt has no complaints or acute changes at this time. Wound care done by nursing students with intructor. Con't IV ABX/ BS control.Will continue with plan of care.

## 2023-03-02 LAB
GLUCOSE BLDC GLUCOMTR-MCNC: 207 MG/DL (ref 70–130)
GLUCOSE BLDC GLUCOMTR-MCNC: 238 MG/DL (ref 70–130)
GLUCOSE BLDC GLUCOMTR-MCNC: 278 MG/DL (ref 70–130)
GLUCOSE BLDC GLUCOMTR-MCNC: 280 MG/DL (ref 70–130)

## 2023-03-02 PROCEDURE — 99308 SBSQ NF CARE LOW MDM 20: CPT | Performed by: INTERNAL MEDICINE

## 2023-03-02 PROCEDURE — 25010000002 CEFTRIAXONE PER 250 MG: Performed by: STUDENT IN AN ORGANIZED HEALTH CARE EDUCATION/TRAINING PROGRAM

## 2023-03-02 PROCEDURE — 82962 GLUCOSE BLOOD TEST: CPT

## 2023-03-02 PROCEDURE — 63710000001 INSULIN DETEMIR PER 5 UNITS: Performed by: STUDENT IN AN ORGANIZED HEALTH CARE EDUCATION/TRAINING PROGRAM

## 2023-03-02 PROCEDURE — 94799 UNLISTED PULMONARY SVC/PX: CPT

## 2023-03-02 PROCEDURE — 63710000001 INSULIN ASPART PER 5 UNITS: Performed by: STUDENT IN AN ORGANIZED HEALTH CARE EDUCATION/TRAINING PROGRAM

## 2023-03-02 PROCEDURE — 94761 N-INVAS EAR/PLS OXIMETRY MLT: CPT

## 2023-03-02 RX ADMIN — VERICIGUAT 2.5 MG: 2.5 TABLET, FILM COATED ORAL at 08:43

## 2023-03-02 RX ADMIN — INSULIN ASPART 30 UNITS: 100 INJECTION, SOLUTION INTRAVENOUS; SUBCUTANEOUS at 17:07

## 2023-03-02 RX ADMIN — PANTOPRAZOLE SODIUM 40 MG: 40 TABLET, DELAYED RELEASE ORAL at 08:42

## 2023-03-02 RX ADMIN — GABAPENTIN 800 MG: 400 CAPSULE ORAL at 20:41

## 2023-03-02 RX ADMIN — APIXABAN 5 MG: 5 TABLET, FILM COATED ORAL at 08:39

## 2023-03-02 RX ADMIN — MAGNESIUM GLUCONATE 500 MG ORAL TABLET 1200 MG: 500 TABLET ORAL at 08:42

## 2023-03-02 RX ADMIN — INSULIN ASPART 4 UNITS: 100 INJECTION, SOLUTION INTRAVENOUS; SUBCUTANEOUS at 17:06

## 2023-03-02 RX ADMIN — VITAMINS A AND D OINTMENT 1 APPLICATION: 15.5; 53.4 OINTMENT TOPICAL at 08:43

## 2023-03-02 RX ADMIN — PANTOPRAZOLE SODIUM 40 MG: 40 TABLET, DELAYED RELEASE ORAL at 20:41

## 2023-03-02 RX ADMIN — CEFTRIAXONE 2 G: 2 INJECTION, POWDER, FOR SOLUTION INTRAMUSCULAR; INTRAVENOUS at 08:38

## 2023-03-02 RX ADMIN — DICYCLOMINE HYDROCHLORIDE 10 MG: 10 CAPSULE ORAL at 08:41

## 2023-03-02 RX ADMIN — NYSTATIN: 100000 POWDER TOPICAL at 08:43

## 2023-03-02 RX ADMIN — DICYCLOMINE HYDROCHLORIDE 10 MG: 10 CAPSULE ORAL at 20:41

## 2023-03-02 RX ADMIN — VITAMINS A AND D OINTMENT 1 APPLICATION: 15.5; 53.4 OINTMENT TOPICAL at 20:41

## 2023-03-02 RX ADMIN — INSULIN ASPART 4 UNITS: 100 INJECTION, SOLUTION INTRAVENOUS; SUBCUTANEOUS at 08:38

## 2023-03-02 RX ADMIN — DULOXETINE HYDROCHLORIDE 60 MG: 60 CAPSULE, DELAYED RELEASE ORAL at 20:41

## 2023-03-02 RX ADMIN — BACLOFEN 30 MG: 10 TABLET ORAL at 12:07

## 2023-03-02 RX ADMIN — ARIPIPRAZOLE 10 MG: 10 TABLET ORAL at 20:41

## 2023-03-02 RX ADMIN — INSULIN DETEMIR 55 UNITS: 100 INJECTION, SOLUTION SUBCUTANEOUS at 08:39

## 2023-03-02 RX ADMIN — MODAFINIL 200 MG: 100 TABLET ORAL at 08:42

## 2023-03-02 RX ADMIN — BACLOFEN 30 MG: 10 TABLET ORAL at 17:06

## 2023-03-02 RX ADMIN — GLIPIZIDE 5 MG: 5 TABLET ORAL at 08:40

## 2023-03-02 RX ADMIN — DULOXETINE HYDROCHLORIDE 60 MG: 60 CAPSULE, DELAYED RELEASE ORAL at 08:42

## 2023-03-02 RX ADMIN — SUCRALFATE 1 G: 1 TABLET ORAL at 08:40

## 2023-03-02 RX ADMIN — INSULIN ASPART 30 UNITS: 100 INJECTION, SOLUTION INTRAVENOUS; SUBCUTANEOUS at 12:07

## 2023-03-02 RX ADMIN — Medication 1 TABLET: at 08:42

## 2023-03-02 RX ADMIN — OXYCODONE HYDROCHLORIDE AND ACETAMINOPHEN 500 MG: 500 TABLET ORAL at 08:41

## 2023-03-02 RX ADMIN — CARVEDILOL 12.5 MG: 6.25 TABLET, FILM COATED ORAL at 17:05

## 2023-03-02 RX ADMIN — Medication 1 CAPSULE: at 08:42

## 2023-03-02 RX ADMIN — SACUBITRIL AND VALSARTAN 1 TABLET: 24; 26 TABLET, FILM COATED ORAL at 20:41

## 2023-03-02 RX ADMIN — Medication 10 ML: at 08:42

## 2023-03-02 RX ADMIN — INSULIN ASPART 30 UNITS: 100 INJECTION, SOLUTION INTRAVENOUS; SUBCUTANEOUS at 08:39

## 2023-03-02 RX ADMIN — Medication 1000 UNITS: at 08:39

## 2023-03-02 RX ADMIN — ATORVASTATIN CALCIUM 10 MG: 10 TABLET, FILM COATED ORAL at 20:41

## 2023-03-02 RX ADMIN — GLIPIZIDE 5 MG: 5 TABLET ORAL at 17:06

## 2023-03-02 RX ADMIN — NYSTATIN: 100000 POWDER TOPICAL at 20:42

## 2023-03-02 RX ADMIN — EMPAGLIFLOZIN 10 MG: 10 TABLET, FILM COATED ORAL at 08:40

## 2023-03-02 RX ADMIN — SPIRONOLACTONE 25 MG: 25 TABLET ORAL at 08:40

## 2023-03-02 RX ADMIN — INSULIN DETEMIR 55 UNITS: 100 INJECTION, SOLUTION SUBCUTANEOUS at 20:41

## 2023-03-02 RX ADMIN — BACLOFEN 30 MG: 10 TABLET ORAL at 20:41

## 2023-03-02 RX ADMIN — BUMETANIDE 2 MG: 1 TABLET ORAL at 08:42

## 2023-03-02 RX ADMIN — GABAPENTIN 800 MG: 400 CAPSULE ORAL at 08:42

## 2023-03-02 RX ADMIN — FERROUS SULFATE TAB 325 MG (65 MG ELEMENTAL FE) 325 MG: 325 (65 FE) TAB at 08:42

## 2023-03-02 RX ADMIN — GABAPENTIN 800 MG: 400 CAPSULE ORAL at 15:31

## 2023-03-02 RX ADMIN — METFORMIN HYDROCHLORIDE 1000 MG: 500 TABLET ORAL at 17:05

## 2023-03-02 RX ADMIN — INSULIN ASPART 6 UNITS: 100 INJECTION, SOLUTION INTRAVENOUS; SUBCUTANEOUS at 12:07

## 2023-03-02 RX ADMIN — METFORMIN HYDROCHLORIDE 1000 MG: 500 TABLET ORAL at 08:41

## 2023-03-02 RX ADMIN — BACLOFEN 30 MG: 10 TABLET ORAL at 08:41

## 2023-03-02 RX ADMIN — APIXABAN 5 MG: 5 TABLET, FILM COATED ORAL at 20:41

## 2023-03-02 RX ADMIN — Medication 10 ML: at 20:42

## 2023-03-02 NOTE — PLAN OF CARE
Goal Outcome Evaluation:  Plan of Care Reviewed With: patient        Progress: no change  Outcome Evaluation: Pt resting in bed at this time. Wound care done per orders. No acute changes at this time.

## 2023-03-02 NOTE — PLAN OF CARE
Goal Outcome Evaluation:  Plan of Care Reviewed With: patient        Progress: no change  Outcome Evaluation: Pt's resting in bed at this time, A&O, stable on RA, Bipap at night. Woundvac in use. Pt has no complaints or acute changes at this time. Wound care done by nursing students with intructor. Pt's swing bed. Con't IV ABX/ BS control. Will continue with plan of care.

## 2023-03-02 NOTE — PROGRESS NOTES
Nutrition Services    Patient Name:  Ken Krueger  YOB: 1975  MRN: [id number]  Admit Date:  2/21/2023    BMI: 42.34  Diet: Consistent carb, high protein  Supplement: Thanh BID  Intake: 92% x 12 meals  Fluid: meeting needs      Electronically signed by:  Karin Aceves RD  03/02/23 08:42 EST

## 2023-03-02 NOTE — PROGRESS NOTES
PROGRESS NOTE         Patient Identification:  Name:  Ken Krueger  Age:  47 y.o.  Sex:  male  :  1975  MRN:  [id number]  Visit Number:  [id number]  Primary Care Provider:  Franchesca Hodges APRN         LOS: 9 days       ----------------------------------------------------------------------------------------------------------------------  Subjective       Chief Complaints:    No chief complaint on file.        Interval History:      Patient sitting up in bed this morning.  No issues or complaints.  Currently on room air with no apparent distress.  Lungs clear to auscultation bilaterally.  Abdomen soft, nontender.  Afebrile, denies diarrhea.    Review of Systems:    Constitutional: no fever, chills and night sweats.  No fatigue.  Eyes: no eye drainage, itching or redness.  HEENT: no mouth sores, dysphagia or nose bleed.  Respiratory: no for shortness of breath, cough, or production of sputum.  Cardiovascular: no chest pain, no palpitations, no orthopnea.  Gastrointestinal: no nausea, vomiting or diarrhea. No abdominal pain, hematemesis or rectal bleeding.  Genitourinary: no dysuria or polyuria.  Hematologic/lymphatic: no lymph node abnormalities, no easy bruising or easy bleeding.  Musculoskeletal: No muscle or joint pain.  Skin: No rash and no itching.  Decubitus ulcers.  Neurological: no loss of consciousness, no seizure, no headache.  Behavioral/Psych: no depression or suicidal ideation.  Endocrine: no hot flashes.  Immunologic: negative.    ----------------------------------------------------------------------------------------------------------------------      Objective       [facility] Meds:    Pharmacy Consult - Pharmacy to dose,       ----------------------------------------------------------------------------------------------------------------------    Vital Signs:  Temp:  [97 °F (36.1 °C)-97.8 °F (36.6 °C)] 97 °F (36.1 °C)  Heart Rate:  [80-88] 80  Resp:  [16-18] 18  BP:  (104-135)/(59-75) 104/59  No data found.  SpO2 Percentage    03/01/23 0300 03/01/23 1846 03/02/23 0935   SpO2: 95% 96% 95%     SpO2:  [95 %-96 %] 95 %  on   ;   Device (Oxygen Therapy): room air    Body mass index is 42.34 kg/m².  Wt Readings from Last 3 Encounters:   02/21/23 (!) 138 kg (303 lb 9.2 oz)   02/20/23 (!) 139 kg (306 lb 14.1 oz)   12/13/22 (!) 141 kg (310 lb 14.4 oz)        Intake/Output Summary (Last 24 hours) at 3/2/2023 1103  Last data filed at 3/2/2023 0935  Gross per 24 hour   Intake 2360 ml   Output 3900 ml   Net -1540 ml     Diet: Diabetic Diets; Consistent Carbohydrate; Texture: Regular Texture (IDDSI 7); Fluid Consistency: Thin (IDDSI 0)  ----------------------------------------------------------------------------------------------------------------------      Physical Exam:    Constitutional:  Well-developed, well-nourished.   HENT:  Head: Normocephalic and atraumatic.  Mouth:  Moist mucous membranes.    Eyes:  Conjunctivae and EOM are normal.  No scleral icterus.  Neck:  Neck supple.  No JVD present.    Cardiovascular:  Normal rate, regular rhythm and normal heart sounds with no murmur. No edema.  Pulmonary/Chest:  No respiratory distress, no wheezes, no crackles, with normal breath sounds and good air movement.  Lungs clear to auscultation bilaterally  Abdominal: Obese.  Soft.  Bowel sounds are normal.  No distension and no tenderness.  Ileal conduit in place.  Colostomy bag in place with soft stool.  Musculoskeletal:  No tenderness.  No swelling or redness of joints.  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  1+ edema of right lower extremity.  PICC to right upper arm with no signs of infection.  Neurological:  Alert and oriented to person, place, and time.  No facial droop.  No slurred speech.   Skin:  Stage IV sacral decubitus wound status post excisional debridement.  Psychiatric:  Normal mood and affect.  Behavior is  normal.      ----------------------------------------------------------------------------------------------------------------------            Results from last 7 days   Lab Units 02/27/23  0156 02/24/23  0132   WBC 10*3/mm3 10.04 11.11*   HEMOGLOBIN g/dL 11.4* 11.4*   HEMATOCRIT % 39.4 38.7   MCV fL 92.7 91.3   MCHC g/dL 28.9* 29.5*   PLATELETS 10*3/mm3 276 320     Results from last 7 days   Lab Units 02/27/23  0156 02/24/23 0132   SODIUM mmol/L 136 135*   POTASSIUM mmol/L 4.4 4.2   CHLORIDE mmol/L 101 97*   CO2 mmol/L 23.7 24.1   BUN mg/dL 38* 39*   CREATININE mg/dL 0.88 1.01   CALCIUM mg/dL 9.9 9.9   GLUCOSE mg/dL 185* 249*   ALBUMIN g/dL 3.3*  --    BILIRUBIN mg/dL 0.2  --    ALK PHOS U/L 117  --    AST (SGOT) U/L 29  --    ALT (SGPT) U/L 35  --    Estimated Creatinine Clearance: 149.9 mL/min (by C-G formula based on SCr of 0.88 mg/dL).  No results found for: AMMONIA    Glucose   Date/Time Value Ref Range Status   03/02/2023 0641 207 (H) 70 - 130 mg/dL Final     Comment:     Meter: [id number] : [id number] Nessacristy Barbosa   03/01/2023 1951 188 (H) 70 - 130 mg/dL Final     Comment:     Meter: [id number] : [id number] ARTURO CORTEZ   03/01/2023 1632 312 (H) 70 - 130 mg/dL Final     Comment:     Meter: [id number] : [id number] leti jan   03/01/2023 1115 329 (H) 70 - 130 mg/dL Final     Comment:     Meter: [id number] : [id number] leti montoya   03/01/2023 0648 225 (H) 70 - 130 mg/dL Final     Comment:     Meter: [id number] : [id number] norbert meza   02/28/2023 2029 174 (H) 70 - 130 mg/dL Final     Comment:     Meter: [id number] : [id number] Nessa Estrada   02/28/2023 1636 176 (H) 70 - 130 mg/dL Final     Comment:     Meter: [id number] : MARU ASIF   02/28/2023 1033 212 (H) 70 - 130 mg/dL Final     Comment:     Meter: [id number] : MARU ASIF     Lab Results   Component Value Date    HGBA1C 11.50 (H) 02/13/2023     Lab Results   Component  Value Date    TSH 3.780 07/19/2022    FREET4 1.31 06/02/2021       Blood Culture   Date Value Ref Range Status   02/12/2023 No growth at 24 hours  Preliminary   02/12/2023 No growth at 24 hours  Preliminary     No results found for: URINECX  No results found for: WOUNDCX  No results found for: STOOLCX  No results found for: RESPCX  Pain Management Panel       Pain Management Panel Latest Ref Rng & Units 6/2/2021 8/19/2018    AMPHETAMINES SCREEN, URINE Negative Negative Negative    BARBITURATES SCREEN Negative Negative Negative    BENZODIAZEPINE SCREEN, URINE Negative Negative Negative    BUPRENORPHINEUR Negative Negative Negative    COCAINE SCREEN, URINE Negative Negative Negative    METHADONE SCREEN, URINE Negative Negative Negative              ----------------------------------------------------------------------------------------------------------------------  Imaging Results (Last 24 Hours)       ** No results found for the last 24 hours. **            -----------------------------------------------------------------------------------  Pertinent Infectious Disease Results     CT abdomen pelvis with contrast on 2/12/2023 showed there is a new decubitus ulcer just to the right of midline at the level of the coccyx with constellation of imaging findings most characteristic of acute infection/osteomyelitis. Small pocket of air and fluid adjacent to the coccyx measuring 3.7 x 4.1 cm could reflect phlegmon or abscess. Chronic bilateral decubitus ulcers at the level of the ischial tuberosities bilaterally with imaging findings suggestive of chronic infection/osteomyelitis, similar to prior. Hepatic steatosis and hepatomegaly with borderline splenomegaly. Nonobstructing left renal stones. Postoperative changes of left lower quadrant and colostomy and right mid abdominal ileal conduit formation. Diverticulosis. No diverticulitis or bowel obstruction. COVID-19 and flu A/B PCR on 2/12/2023 was negative.    Status post  excisional debridement of sacral ulcer on 2/13/2023 with Dr. Taylor.           Assessment/Plan         ASSESSMENT:    Septic shock with lactic acid greater than 4 on admission  Acute and chronic decubitus ulcerations with osteomyelitis and possible abscess      PLAN:    I saw the patient this morning and discussed the case and plan of care with GUANACO Flores and got report from primary RN and here are my findings:    This morning, the patient was observed sitting up in bed and did not report any issues or complaints. The patient appeared to be breathing comfortably without any signs of respiratory distress while on room air.    Upon auscultation, the lungs were clear bilaterally, and the abdomen was soft and nontender. There were no signs of fever or diarrhea.    The patient is being treated for osteomyelitis and is currently receiving ceftriaxone at a dose of 2 g IV every 24 hours until 3/29/2023.      Code Status:   Code Status and Medical Interventions:   Ordered at: 02/21/23 1034     Code Status (Patient has no pulse and is not breathing):    CPR (Attempt to Resuscitate)     Medical Interventions (Patient has pulse or is breathing):    Full Support       GUANACO Flores  03/02/23  11:03 EST    Electronically signed by GUANACO Flores, 03/02/23, 11:04 AM EST.    Electronically signed by Tra Jain MD, 03/02/23, 11:50 AM EST.

## 2023-03-03 LAB
GLUCOSE BLDC GLUCOMTR-MCNC: 219 MG/DL (ref 70–130)
GLUCOSE BLDC GLUCOMTR-MCNC: 234 MG/DL (ref 70–130)
GLUCOSE BLDC GLUCOMTR-MCNC: 279 MG/DL (ref 70–130)
GLUCOSE BLDC GLUCOMTR-MCNC: 316 MG/DL (ref 70–130)

## 2023-03-03 PROCEDURE — 99309 SBSQ NF CARE MODERATE MDM 30: CPT | Performed by: NURSE PRACTITIONER

## 2023-03-03 PROCEDURE — 25010000002 CEFTRIAXONE PER 250 MG: Performed by: STUDENT IN AN ORGANIZED HEALTH CARE EDUCATION/TRAINING PROGRAM

## 2023-03-03 PROCEDURE — 63710000001 INSULIN DETEMIR PER 5 UNITS: Performed by: HOSPITALIST

## 2023-03-03 PROCEDURE — 82962 GLUCOSE BLOOD TEST: CPT

## 2023-03-03 PROCEDURE — 63710000001 INSULIN ASPART PER 5 UNITS: Performed by: STUDENT IN AN ORGANIZED HEALTH CARE EDUCATION/TRAINING PROGRAM

## 2023-03-03 RX ADMIN — VITAMINS A AND D OINTMENT 1 APPLICATION: 15.5; 53.4 OINTMENT TOPICAL at 22:00

## 2023-03-03 RX ADMIN — INSULIN ASPART 4 UNITS: 100 INJECTION, SOLUTION INTRAVENOUS; SUBCUTANEOUS at 17:11

## 2023-03-03 RX ADMIN — VITAMINS A AND D OINTMENT 1 APPLICATION: 15.5; 53.4 OINTMENT TOPICAL at 09:52

## 2023-03-03 RX ADMIN — VERICIGUAT 2.5 MG: 2.5 TABLET, FILM COATED ORAL at 10:04

## 2023-03-03 RX ADMIN — CEFTRIAXONE 2 G: 2 INJECTION, POWDER, FOR SOLUTION INTRAMUSCULAR; INTRAVENOUS at 08:17

## 2023-03-03 RX ADMIN — INSULIN ASPART 7 UNITS: 100 INJECTION, SOLUTION INTRAVENOUS; SUBCUTANEOUS at 11:49

## 2023-03-03 RX ADMIN — BUMETANIDE 2 MG: 1 TABLET ORAL at 09:50

## 2023-03-03 RX ADMIN — Medication 10 ML: at 10:07

## 2023-03-03 RX ADMIN — SACUBITRIL AND VALSARTAN 1 TABLET: 24; 26 TABLET, FILM COATED ORAL at 22:00

## 2023-03-03 RX ADMIN — BACLOFEN 30 MG: 10 TABLET ORAL at 10:00

## 2023-03-03 RX ADMIN — INSULIN ASPART 30 UNITS: 100 INJECTION, SOLUTION INTRAVENOUS; SUBCUTANEOUS at 11:49

## 2023-03-03 RX ADMIN — SPIRONOLACTONE 25 MG: 25 TABLET ORAL at 10:03

## 2023-03-03 RX ADMIN — OXYCODONE HYDROCHLORIDE AND ACETAMINOPHEN 500 MG: 500 TABLET ORAL at 09:59

## 2023-03-03 RX ADMIN — GLIPIZIDE 5 MG: 5 TABLET ORAL at 09:58

## 2023-03-03 RX ADMIN — INSULIN DETEMIR 60 UNITS: 100 INJECTION, SOLUTION SUBCUTANEOUS at 22:00

## 2023-03-03 RX ADMIN — SUCRALFATE 1 G: 1 TABLET ORAL at 10:01

## 2023-03-03 RX ADMIN — Medication 10 ML: at 22:00

## 2023-03-03 RX ADMIN — METFORMIN HYDROCHLORIDE 1000 MG: 500 TABLET ORAL at 09:52

## 2023-03-03 RX ADMIN — SACUBITRIL AND VALSARTAN 1 TABLET: 24; 26 TABLET, FILM COATED ORAL at 10:02

## 2023-03-03 RX ADMIN — BACLOFEN 30 MG: 10 TABLET ORAL at 22:00

## 2023-03-03 RX ADMIN — DULOXETINE HYDROCHLORIDE 60 MG: 60 CAPSULE, DELAYED RELEASE ORAL at 22:00

## 2023-03-03 RX ADMIN — PANTOPRAZOLE SODIUM 40 MG: 40 TABLET, DELAYED RELEASE ORAL at 22:00

## 2023-03-03 RX ADMIN — INSULIN DETEMIR 60 UNITS: 100 INJECTION, SOLUTION SUBCUTANEOUS at 10:06

## 2023-03-03 RX ADMIN — GABAPENTIN 800 MG: 400 CAPSULE ORAL at 16:16

## 2023-03-03 RX ADMIN — DICYCLOMINE HYDROCHLORIDE 10 MG: 10 CAPSULE ORAL at 22:00

## 2023-03-03 RX ADMIN — BACLOFEN 30 MG: 10 TABLET ORAL at 17:12

## 2023-03-03 RX ADMIN — DICYCLOMINE HYDROCHLORIDE 10 MG: 10 CAPSULE ORAL at 11:49

## 2023-03-03 RX ADMIN — GABAPENTIN 800 MG: 400 CAPSULE ORAL at 09:41

## 2023-03-03 RX ADMIN — GLIPIZIDE 5 MG: 5 TABLET ORAL at 17:13

## 2023-03-03 RX ADMIN — ARIPIPRAZOLE 10 MG: 10 TABLET ORAL at 22:00

## 2023-03-03 RX ADMIN — DULOXETINE HYDROCHLORIDE 60 MG: 60 CAPSULE, DELAYED RELEASE ORAL at 09:48

## 2023-03-03 RX ADMIN — CARVEDILOL 12.5 MG: 6.25 TABLET, FILM COATED ORAL at 17:11

## 2023-03-03 RX ADMIN — NYSTATIN: 100000 POWDER TOPICAL at 22:00

## 2023-03-03 RX ADMIN — MODAFINIL 200 MG: 100 TABLET ORAL at 09:41

## 2023-03-03 RX ADMIN — INSULIN ASPART 30 UNITS: 100 INJECTION, SOLUTION INTRAVENOUS; SUBCUTANEOUS at 17:12

## 2023-03-03 RX ADMIN — CARVEDILOL 12.5 MG: 6.25 TABLET, FILM COATED ORAL at 09:58

## 2023-03-03 RX ADMIN — MAGNESIUM GLUCONATE 500 MG ORAL TABLET 1200 MG: 500 TABLET ORAL at 10:03

## 2023-03-03 RX ADMIN — INSULIN ASPART 30 UNITS: 100 INJECTION, SOLUTION INTRAVENOUS; SUBCUTANEOUS at 09:52

## 2023-03-03 RX ADMIN — Medication 1 TABLET: at 10:04

## 2023-03-03 RX ADMIN — EMPAGLIFLOZIN 10 MG: 10 TABLET, FILM COATED ORAL at 09:57

## 2023-03-03 RX ADMIN — GABAPENTIN 800 MG: 400 CAPSULE ORAL at 22:00

## 2023-03-03 RX ADMIN — FERROUS SULFATE TAB 325 MG (65 MG ELEMENTAL FE) 325 MG: 325 (65 FE) TAB at 09:53

## 2023-03-03 RX ADMIN — INSULIN ASPART 6 UNITS: 100 INJECTION, SOLUTION INTRAVENOUS; SUBCUTANEOUS at 10:05

## 2023-03-03 RX ADMIN — Medication 1 CAPSULE: at 10:01

## 2023-03-03 RX ADMIN — NYSTATIN: 100000 POWDER TOPICAL at 09:52

## 2023-03-03 RX ADMIN — Medication 1000 UNITS: at 11:49

## 2023-03-03 RX ADMIN — APIXABAN 5 MG: 5 TABLET, FILM COATED ORAL at 22:00

## 2023-03-03 RX ADMIN — ATORVASTATIN CALCIUM 10 MG: 10 TABLET, FILM COATED ORAL at 22:00

## 2023-03-03 RX ADMIN — METFORMIN HYDROCHLORIDE 1000 MG: 500 TABLET ORAL at 17:12

## 2023-03-03 RX ADMIN — PANTOPRAZOLE SODIUM 40 MG: 40 TABLET, DELAYED RELEASE ORAL at 10:02

## 2023-03-03 RX ADMIN — BACLOFEN 30 MG: 10 TABLET ORAL at 11:49

## 2023-03-03 RX ADMIN — APIXABAN 5 MG: 5 TABLET, FILM COATED ORAL at 10:00
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
## 2023-03-03 NOTE — CASE MANAGEMENT/SOCIAL WORK
Discharge Planning Assessment  MAKI Regalado     Patient Name: Ken Krueger  MRN: [id number]  Today's Date: 3/3/2023    Admit Date: 2/21/2023    Plan: Pt was admitted to swing bed on 2/21/23. Pt has been approved for additional swing bed days with clinical updates for continued states with review on 3/7/23. SS to follow and assist as needed with discharge needs.   Discharge Plan     Row Name 03/03/23 1005       Plan    Plan Pt was admitted to swing bed on 2/21/23. Pt has been approved for additional swing bed days with clinical updates for continued states with review on 3/7/23. SS to follow and assist as needed with discharge needs.                  HUMPHREY Loya

## 2023-03-03 NOTE — PLAN OF CARE
Goal Outcome Evaluation:  Plan of Care Reviewed With: patient        Progress: no change  Outcome Evaluation: Pt's resting in bed at this time, A&O, stable on RA, Bipap at night. Woundvac in use. Pt has no complaints or acute changes at this time. Wound care done per orders. Pt's swing bed. Con't IV ABX/ BS control. Will continue with plan of care.

## 2023-03-03 NOTE — PROGRESS NOTES
Antimicrobial Length of Therapy:    Ceftriaxone day 17, to continue until 3/29.     Thank you,   Esther Vick, Pharm.D.   Pharmacy Resident   3/3/2023  14:13 EST

## 2023-03-03 NOTE — PROGRESS NOTES
PROGRESS NOTE         Patient Identification:  Name:  Ken Krueger  Age:  47 y.o.  Sex:  male  :  1975  MRN:  [id number]  Visit Number:  [id number]  Primary Care Provider:  Franchesca Hogdes APRN         LOS: 10 days       ----------------------------------------------------------------------------------------------------------------------  Subjective       Chief Complaints:    No chief complaint on file.        Interval History:      Patient resting comfortably in bed this morning.  Currently on his BiPAP.  No issues overnight.  Afebrile, denies diarrhea.  Lungs clear to auscultation bilaterally.  Abdomen soft, nontender.    Review of Systems:    Constitutional: no fever, chills and night sweats.  No fatigue.  Eyes: no eye drainage, itching or redness.  HEENT: no mouth sores, dysphagia or nose bleed.  Respiratory: no for shortness of breath, cough, or production of sputum.  Cardiovascular: no chest pain, no palpitations, no orthopnea.  Gastrointestinal: no nausea, vomiting or diarrhea. No abdominal pain, hematemesis or rectal bleeding.  Genitourinary: no dysuria or polyuria.  Hematologic/lymphatic: no lymph node abnormalities, no easy bruising or easy bleeding.  Musculoskeletal: No muscle or joint pain.  Skin: No rash and no itching.  Decubitus ulcers.  Neurological: no loss of consciousness, no seizure, no headache.  Behavioral/Psych: no depression or suicidal ideation.  Endocrine: no hot flashes.  Immunologic: negative.    ----------------------------------------------------------------------------------------------------------------------      Objective       [facility] Meds:    Pharmacy Consult - Pharmacy to dose,       ----------------------------------------------------------------------------------------------------------------------    Vital Signs:  Temp:  [97.2 °F (36.2 °C)-97.8 °F (36.6 °C)] 97.2 °F (36.2 °C)  Heart Rate:  [74-86] 78  Resp:  [18-19] 19  BP: (115-141)/(70-81)  133/81  No data found.  SpO2 Percentage    03/01/23 1846 03/02/23 0935 03/02/23 1900   SpO2: 96% 95% 96%     SpO2:  [96 %] 96 %  on   ;   Device (Oxygen Therapy): room air    Body mass index is 42.34 kg/m².  Wt Readings from Last 3 Encounters:   02/21/23 (!) 138 kg (303 lb 9.2 oz)   02/20/23 (!) 139 kg (306 lb 14.1 oz)   12/13/22 (!) 141 kg (310 lb 14.4 oz)        Intake/Output Summary (Last 24 hours) at 3/3/2023 1156  Last data filed at 3/3/2023 0259  Gross per 24 hour   Intake 720 ml   Output 4750 ml   Net -4030 ml     Diet: Diabetic Diets; Consistent Carbohydrate; Texture: Regular Texture (IDDSI 7); Fluid Consistency: Thin (IDDSI 0)  ----------------------------------------------------------------------------------------------------------------------      Physical Exam:    Constitutional:  Well-developed, well-nourished.   HENT:  Head: Normocephalic and atraumatic.  Mouth:  Moist mucous membranes.    Eyes:  Conjunctivae and EOM are normal.  No scleral icterus.  Neck:  Neck supple.  No JVD present.    Cardiovascular:  Normal rate, regular rhythm and normal heart sounds with no murmur. No edema.  Pulmonary/Chest:  No respiratory distress, no wheezes, no crackles, with normal breath sounds and good air movement.  Lungs clear to auscultation bilaterally  Abdominal: Obese.  Soft.  Bowel sounds are normal.  No distension and no tenderness.  Ileal conduit in place.  Colostomy bag in place with soft stool.  Musculoskeletal:  No tenderness.  No swelling or redness of joints.  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  1+ edema of right lower extremity.  PICC to right upper arm with no signs of infection.  Neurological:  Alert and oriented to person, place, and time.  No facial droop.  No slurred speech.   Skin:  Stage IV sacral decubitus wound status post excisional debridement.  Psychiatric:  Normal mood and affect.  Behavior is  normal.      ----------------------------------------------------------------------------------------------------------------------            Results from last 7 days   Lab Units 02/27/23  0156   WBC 10*3/mm3 10.04   HEMOGLOBIN g/dL 11.4*   HEMATOCRIT % 39.4   MCV fL 92.7   MCHC g/dL 28.9*   PLATELETS 10*3/mm3 276     Results from last 7 days   Lab Units 02/27/23  0156   SODIUM mmol/L 136   POTASSIUM mmol/L 4.4   CHLORIDE mmol/L 101   CO2 mmol/L 23.7   BUN mg/dL 38*   CREATININE mg/dL 0.88   CALCIUM mg/dL 9.9   GLUCOSE mg/dL 185*   ALBUMIN g/dL 3.3*   BILIRUBIN mg/dL 0.2   ALK PHOS U/L 117   AST (SGOT) U/L 29   ALT (SGPT) U/L 35   Estimated Creatinine Clearance: 149.9 mL/min (by C-G formula based on SCr of 0.88 mg/dL).  No results found for: AMMONIA    Glucose   Date/Time Value Ref Range Status   03/03/2023 1119 316 (H) 70 - 130 mg/dL Final     Comment:     Meter: [id number] : [id number] leti montoya   03/03/2023 0700 279 (H) 70 - 130 mg/dL Final     Comment:     Meter: [id number] : [id number] Nessa Barbosa   03/02/2023 1930 280 (H) 70 - 130 mg/dL Final     Comment:     Meter: [id number] : [id number] ARTURO CORTEZ   03/02/2023 1633 238 (H) 70 - 130 mg/dL Final     Comment:     Meter: [id number] : [id number] leti montoya   03/02/2023 1123 278 (H) 70 - 130 mg/dL Final     Comment:     Meter: [id number] : [id number] leti montoya   03/02/2023 0641 207 (H) 70 - 130 mg/dL Final     Comment:     Meter: [id number] : [id number] Nessa Barbosa   03/01/2023 1951 188 (H) 70 - 130 mg/dL Final     Comment:     Meter: [id number] : [id number] ARTURO JACKSONCLAIR   03/01/2023 1632 312 (H) 70 - 130 mg/dL Final     Comment:     Meter: [id number] : [id number] leti montoya     Lab Results   Component Value Date    HGBA1C 11.50 (H) 02/13/2023     Lab Results   Component Value Date    TSH 3.780 07/19/2022    FREET4 1.31 06/02/2021       Blood Culture   Date Value Ref Range Status   02/12/2023 No  growth at 24 hours  Preliminary   02/12/2023 No growth at 24 hours  Preliminary     No results found for: URINECX  No results found for: WOUNDCX  No results found for: STOOLCX  No results found for: RESPCX  Pain Management Panel     Pain Management Panel Latest Ref Rng & Units 6/2/2021 8/19/2018    AMPHETAMINES SCREEN, URINE Negative Negative Negative    BARBITURATES SCREEN Negative Negative Negative    BENZODIAZEPINE SCREEN, URINE Negative Negative Negative    BUPRENORPHINEUR Negative Negative Negative    COCAINE SCREEN, URINE Negative Negative Negative    METHADONE SCREEN, URINE Negative Negative Negative            ----------------------------------------------------------------------------------------------------------------------  Imaging Results (Last 24 Hours)     ** No results found for the last 24 hours. **          -----------------------------------------------------------------------------------  Pertinent Infectious Disease Results     CT abdomen pelvis with contrast on 2/12/2023 showed there is a new decubitus ulcer just to the right of midline at the level of the coccyx with constellation of imaging findings most characteristic of acute infection/osteomyelitis. Small pocket of air and fluid adjacent to the coccyx measuring 3.7 x 4.1 cm could reflect phlegmon or abscess. Chronic bilateral decubitus ulcers at the level of the ischial tuberosities bilaterally with imaging findings suggestive of chronic infection/osteomyelitis, similar to prior. Hepatic steatosis and hepatomegaly with borderline splenomegaly. Nonobstructing left renal stones. Postoperative changes of left lower quadrant and colostomy and right mid abdominal ileal conduit formation. Diverticulosis. No diverticulitis or bowel obstruction. COVID-19 and flu A/B PCR on 2/12/2023 was negative.    Status post excisional debridement of sacral ulcer on 2/13/2023 with Dr. Taylor.           Assessment/Plan         ASSESSMENT:    Septic shock with  lactic acid greater than 4 on admission  Acute and chronic decubitus ulcerations with osteomyelitis and possible abscess      PLAN:    Patient resting comfortably in bed this morning.  Currently on his BiPAP.  No issues overnight.  Afebrile, denies diarrhea.  Lungs clear to auscultation bilaterally.  Abdomen soft, nontender.    The patient is being treated for osteomyelitis and is currently receiving ceftriaxone at a dose of 2 g IV every 24 hours until 3/29/2023.  We will continue to follow closely.      Code Status:   Code Status and Medical Interventions:   Ordered at: 02/21/23 1034     Code Status (Patient has no pulse and is not breathing):    CPR (Attempt to Resuscitate)     Medical Interventions (Patient has pulse or is breathing):    Full Support       GUANACO Flores  03/03/23  11:56 EST

## 2023-03-04 LAB
GLUCOSE BLDC GLUCOMTR-MCNC: 202 MG/DL (ref 70–130)
GLUCOSE BLDC GLUCOMTR-MCNC: 239 MG/DL (ref 70–130)
GLUCOSE BLDC GLUCOMTR-MCNC: 284 MG/DL (ref 70–130)
GLUCOSE BLDC GLUCOMTR-MCNC: 324 MG/DL (ref 70–130)

## 2023-03-04 PROCEDURE — 25010000002 CEFTRIAXONE PER 250 MG: Performed by: STUDENT IN AN ORGANIZED HEALTH CARE EDUCATION/TRAINING PROGRAM

## 2023-03-04 PROCEDURE — 63710000001 INSULIN ASPART PER 5 UNITS: Performed by: STUDENT IN AN ORGANIZED HEALTH CARE EDUCATION/TRAINING PROGRAM

## 2023-03-04 PROCEDURE — 63710000001 INSULIN DETEMIR PER 5 UNITS: Performed by: HOSPITALIST

## 2023-03-04 PROCEDURE — 99308 SBSQ NF CARE LOW MDM 20: CPT | Performed by: NURSE PRACTITIONER

## 2023-03-04 PROCEDURE — 82962 GLUCOSE BLOOD TEST: CPT

## 2023-03-04 RX ADMIN — GLIPIZIDE 5 MG: 5 TABLET ORAL at 17:00

## 2023-03-04 RX ADMIN — APIXABAN 5 MG: 5 TABLET, FILM COATED ORAL at 20:27

## 2023-03-04 RX ADMIN — DULOXETINE HYDROCHLORIDE 60 MG: 60 CAPSULE, DELAYED RELEASE ORAL at 08:56

## 2023-03-04 RX ADMIN — INSULIN DETEMIR 60 UNITS: 100 INJECTION, SOLUTION SUBCUTANEOUS at 08:57

## 2023-03-04 RX ADMIN — Medication 1000 UNITS: at 08:55

## 2023-03-04 RX ADMIN — DICYCLOMINE HYDROCHLORIDE 10 MG: 10 CAPSULE ORAL at 20:28

## 2023-03-04 RX ADMIN — PANTOPRAZOLE SODIUM 40 MG: 40 TABLET, DELAYED RELEASE ORAL at 20:28

## 2023-03-04 RX ADMIN — Medication 10 ML: at 08:59

## 2023-03-04 RX ADMIN — INSULIN ASPART 30 UNITS: 100 INJECTION, SOLUTION INTRAVENOUS; SUBCUTANEOUS at 11:29

## 2023-03-04 RX ADMIN — METFORMIN HYDROCHLORIDE 1000 MG: 500 TABLET ORAL at 08:55

## 2023-03-04 RX ADMIN — GLIPIZIDE 5 MG: 5 TABLET ORAL at 06:31

## 2023-03-04 RX ADMIN — BACLOFEN 30 MG: 10 TABLET ORAL at 08:55

## 2023-03-04 RX ADMIN — INSULIN ASPART 30 UNITS: 100 INJECTION, SOLUTION INTRAVENOUS; SUBCUTANEOUS at 17:01

## 2023-03-04 RX ADMIN — FERROUS SULFATE TAB 325 MG (65 MG ELEMENTAL FE) 325 MG: 325 (65 FE) TAB at 08:56

## 2023-03-04 RX ADMIN — PANTOPRAZOLE SODIUM 40 MG: 40 TABLET, DELAYED RELEASE ORAL at 08:56

## 2023-03-04 RX ADMIN — CEFTRIAXONE 2 G: 2 INJECTION, POWDER, FOR SOLUTION INTRAMUSCULAR; INTRAVENOUS at 08:58

## 2023-03-04 RX ADMIN — BACLOFEN 30 MG: 10 TABLET ORAL at 11:29

## 2023-03-04 RX ADMIN — EMPAGLIFLOZIN 10 MG: 10 TABLET, FILM COATED ORAL at 08:56

## 2023-03-04 RX ADMIN — METFORMIN HYDROCHLORIDE 1000 MG: 500 TABLET ORAL at 17:01

## 2023-03-04 RX ADMIN — INSULIN ASPART 7 UNITS: 100 INJECTION, SOLUTION INTRAVENOUS; SUBCUTANEOUS at 08:58

## 2023-03-04 RX ADMIN — MAGNESIUM GLUCONATE 500 MG ORAL TABLET 1200 MG: 500 TABLET ORAL at 08:55

## 2023-03-04 RX ADMIN — GABAPENTIN 800 MG: 400 CAPSULE ORAL at 17:00

## 2023-03-04 RX ADMIN — VITAMINS A AND D OINTMENT 1 APPLICATION: 15.5; 53.4 OINTMENT TOPICAL at 08:58

## 2023-03-04 RX ADMIN — ATORVASTATIN CALCIUM 10 MG: 10 TABLET, FILM COATED ORAL at 20:28

## 2023-03-04 RX ADMIN — NYSTATIN: 100000 POWDER TOPICAL at 20:29

## 2023-03-04 RX ADMIN — MODAFINIL 200 MG: 100 TABLET ORAL at 09:01

## 2023-03-04 RX ADMIN — OXYCODONE HYDROCHLORIDE AND ACETAMINOPHEN 500 MG: 500 TABLET ORAL at 08:56

## 2023-03-04 RX ADMIN — INSULIN ASPART 6 UNITS: 100 INJECTION, SOLUTION INTRAVENOUS; SUBCUTANEOUS at 11:29

## 2023-03-04 RX ADMIN — SACUBITRIL AND VALSARTAN 1 TABLET: 24; 26 TABLET, FILM COATED ORAL at 08:56

## 2023-03-04 RX ADMIN — DULOXETINE HYDROCHLORIDE 60 MG: 60 CAPSULE, DELAYED RELEASE ORAL at 20:27

## 2023-03-04 RX ADMIN — INSULIN ASPART 30 UNITS: 100 INJECTION, SOLUTION INTRAVENOUS; SUBCUTANEOUS at 08:57

## 2023-03-04 RX ADMIN — SACUBITRIL AND VALSARTAN 1 TABLET: 24; 26 TABLET, FILM COATED ORAL at 20:28

## 2023-03-04 RX ADMIN — SUCRALFATE 1 G: 1 TABLET ORAL at 08:54

## 2023-03-04 RX ADMIN — VERICIGUAT 2.5 MG: 2.5 TABLET, FILM COATED ORAL at 08:59

## 2023-03-04 RX ADMIN — GABAPENTIN 800 MG: 400 CAPSULE ORAL at 08:57

## 2023-03-04 RX ADMIN — SPIRONOLACTONE 25 MG: 25 TABLET ORAL at 08:56

## 2023-03-04 RX ADMIN — APIXABAN 5 MG: 5 TABLET, FILM COATED ORAL at 08:55

## 2023-03-04 RX ADMIN — VITAMINS A AND D OINTMENT 1 APPLICATION: 15.5; 53.4 OINTMENT TOPICAL at 20:28

## 2023-03-04 RX ADMIN — Medication 1 TABLET: at 08:55

## 2023-03-04 RX ADMIN — DICYCLOMINE HYDROCHLORIDE 10 MG: 10 CAPSULE ORAL at 08:56

## 2023-03-04 RX ADMIN — Medication 10 ML: at 20:29

## 2023-03-04 RX ADMIN — Medication 1 CAPSULE: at 08:56

## 2023-03-04 RX ADMIN — BUMETANIDE 2 MG: 1 TABLET ORAL at 08:54

## 2023-03-04 RX ADMIN — INSULIN ASPART 4 UNITS: 100 INJECTION, SOLUTION INTRAVENOUS; SUBCUTANEOUS at 17:02

## 2023-03-04 RX ADMIN — ARIPIPRAZOLE 10 MG: 10 TABLET ORAL at 20:27

## 2023-03-04 RX ADMIN — GABAPENTIN 800 MG: 400 CAPSULE ORAL at 20:27

## 2023-03-04 RX ADMIN — CARVEDILOL 12.5 MG: 6.25 TABLET, FILM COATED ORAL at 17:01

## 2023-03-04 RX ADMIN — BACLOFEN 30 MG: 10 TABLET ORAL at 20:28

## 2023-03-04 RX ADMIN — CARVEDILOL 12.5 MG: 6.25 TABLET, FILM COATED ORAL at 08:54

## 2023-03-04 RX ADMIN — BACLOFEN 30 MG: 10 TABLET ORAL at 17:00

## 2023-03-04 RX ADMIN — NYSTATIN: 100000 POWDER TOPICAL at 08:59

## 2023-03-04 RX ADMIN — INSULIN DETEMIR 60 UNITS: 100 INJECTION, SOLUTION SUBCUTANEOUS at 20:28

## 2023-03-04 NOTE — PLAN OF CARE
Goal Outcome Evaluation:  Plan of Care Reviewed With: patient        Progress: no change   Pt is resting well in bed. Has had no acute changes during shift. Will continue to monitor and follow current plan of care

## 2023-03-04 NOTE — PROGRESS NOTES
PROGRESS NOTE         Patient Identification:  Name:  Ken Krueger  Age:  47 y.o.  Sex:  male  :  1975  MRN:  [id number]  Visit Number:  [id number]  Primary Care Provider:  Franchesca Hodges APRN         LOS: 11 days       ----------------------------------------------------------------------------------------------------------------------  Subjective       Chief Complaints:    No chief complaint on file.        Interval History:      Patient sitting up in bed this morning eating breakfast.  No issues or complaints.  Currently on room air with no apparent distress.  Lungs clear to auscultation bilaterally.  Afebrile, denies increase in ostomy output.  No new labs this morning.    Review of Systems:    Constitutional: no fever, chills and night sweats.  No fatigue.  Eyes: no eye drainage, itching or redness.  HEENT: no mouth sores, dysphagia or nose bleed.  Respiratory: no for shortness of breath, cough, or production of sputum.  Cardiovascular: no chest pain, no palpitations, no orthopnea.  Gastrointestinal: no nausea, vomiting or diarrhea. No abdominal pain, hematemesis or rectal bleeding.  Genitourinary: no dysuria or polyuria.  Hematologic/lymphatic: no lymph node abnormalities, no easy bruising or easy bleeding.  Musculoskeletal: No muscle or joint pain.  Skin: No rash and no itching.  Decubitus ulcers.  Neurological: no loss of consciousness, no seizure, no headache.  Behavioral/Psych: no depression or suicidal ideation.  Endocrine: no hot flashes.  Immunologic: negative.    ----------------------------------------------------------------------------------------------------------------------      Objective       [facility] Meds:    Pharmacy Consult - Pharmacy to dose,       ----------------------------------------------------------------------------------------------------------------------    Vital Signs:  Temp:  [97.4 °F (36.3 °C)-98.3 °F (36.8 °C)] 97.4 °F (36.3 °C)  Heart Rate:   [82-87] 82  Resp:  [18] 18  BP: (115-136)/(75-79) 115/75  No data found.  SpO2 Percentage    03/02/23 0935 03/02/23 1900 03/03/23 1903   SpO2: 95% 96% 94%     SpO2:  [94 %] 94 %  on   ;   Device (Oxygen Therapy): room air    Body mass index is 42.34 kg/m².  Wt Readings from Last 3 Encounters:   02/21/23 (!) 138 kg (303 lb 9.2 oz)   02/20/23 (!) 139 kg (306 lb 14.1 oz)   12/13/22 (!) 141 kg (310 lb 14.4 oz)        Intake/Output Summary (Last 24 hours) at 3/4/2023 0959  Last data filed at 3/4/2023 0900  Gross per 24 hour   Intake 2360 ml   Output 3850 ml   Net -1490 ml     Diet: Diabetic Diets; Consistent Carbohydrate; Texture: Regular Texture (IDDSI 7); Fluid Consistency: Thin (IDDSI 0)  ----------------------------------------------------------------------------------------------------------------------      Physical Exam:    Constitutional:  Well-developed, well-nourished.  On room air without apparent distress. Eating breakfast.  HENT:  Head: Normocephalic and atraumatic.  Mouth:  Moist mucous membranes.    Eyes:  Conjunctivae and EOM are normal.  No scleral icterus.  Neck:  Neck supple.  No JVD present.    Cardiovascular:  Normal rate, regular rhythm and normal heart sounds with no murmur. No edema.  Pulmonary/Chest:  No respiratory distress, no wheezes, no crackles, with normal breath sounds and good air movement.  Lungs clear to auscultation bilaterally  Abdominal: Obese.  Soft.  Bowel sounds are normal.  No distension and no tenderness.  Ileal conduit in place.  Colostomy bag in place with soft stool.  Musculoskeletal:  No tenderness.  No swelling or redness of joints.  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  1+ edema of right lower extremity.  PICC to right upper arm with no signs of infection.  Neurological:  Alert and oriented to person, place, and time.  No facial droop.  No slurred speech.   Skin:  Stage IV sacral decubitus wound status post excisional debridement.  Psychiatric:  Normal mood and affect.  Behavior is  normal.      ----------------------------------------------------------------------------------------------------------------------            Results from last 7 days   Lab Units 02/27/23  0156   WBC 10*3/mm3 10.04   HEMOGLOBIN g/dL 11.4*   HEMATOCRIT % 39.4   MCV fL 92.7   MCHC g/dL 28.9*   PLATELETS 10*3/mm3 276     Results from last 7 days   Lab Units 02/27/23  0156   SODIUM mmol/L 136   POTASSIUM mmol/L 4.4   CHLORIDE mmol/L 101   CO2 mmol/L 23.7   BUN mg/dL 38*   CREATININE mg/dL 0.88   CALCIUM mg/dL 9.9   GLUCOSE mg/dL 185*   ALBUMIN g/dL 3.3*   BILIRUBIN mg/dL 0.2   ALK PHOS U/L 117   AST (SGOT) U/L 29   ALT (SGPT) U/L 35   Estimated Creatinine Clearance: 149.9 mL/min (by C-G formula based on SCr of 0.88 mg/dL).  No results found for: AMMONIA    Glucose   Date/Time Value Ref Range Status   03/04/2023 0620 324 (H) 70 - 130 mg/dL Final     Comment:     Meter: [id number] : [id number] Maria Isabel Gorman   03/03/2023 2014 234 (H) 70 - 130 mg/dL Final     Comment:     Meter: [id number] : [id number] WILBER ELZA   03/03/2023 1618 219 (H) 70 - 130 mg/dL Final     Comment:     Meter: [id number] : [id number] leti jan   03/03/2023 1119 316 (H) 70 - 130 mg/dL Final     Comment:     Meter: [id number] : [id number] leti jan   03/03/2023 0700 279 (H) 70 - 130 mg/dL Final     Comment:     Meter: [id number] : [id number] Nessa Barbosa   03/02/2023 1930 280 (H) 70 - 130 mg/dL Final     Comment:     Meter: [id number] : [id number] ARTURO MUHAMMADAIR   03/02/2023 1633 238 (H) 70 - 130 mg/dL Final     Comment:     Meter: [id number] : [id number] leti montoya   03/02/2023 1123 278 (H) 70 - 130 mg/dL Final     Comment:     Meter: [id number] : [id number] leti montoya     Lab Results   Component Value Date    HGBA1C 11.50 (H) 02/13/2023     Lab Results   Component Value Date    TSH 3.780 07/19/2022    FREET4 1.31 06/02/2021       Blood Culture   Date Value Ref Range Status   02/12/2023 No  growth at 24 hours  Preliminary   02/12/2023 No growth at 24 hours  Preliminary     No results found for: URINECX  No results found for: WOUNDCX  No results found for: STOOLCX  No results found for: RESPCX  Pain Management Panel     Pain Management Panel Latest Ref Rng & Units 6/2/2021 8/19/2018    AMPHETAMINES SCREEN, URINE Negative Negative Negative    BARBITURATES SCREEN Negative Negative Negative    BENZODIAZEPINE SCREEN, URINE Negative Negative Negative    BUPRENORPHINEUR Negative Negative Negative    COCAINE SCREEN, URINE Negative Negative Negative    METHADONE SCREEN, URINE Negative Negative Negative            ----------------------------------------------------------------------------------------------------------------------  Imaging Results (Last 24 Hours)     ** No results found for the last 24 hours. **          -----------------------------------------------------------------------------------  Pertinent Infectious Disease Results     CT abdomen pelvis with contrast on 2/12/2023 showed there is a new decubitus ulcer just to the right of midline at the level of the coccyx with constellation of imaging findings most characteristic of acute infection/osteomyelitis. Small pocket of air and fluid adjacent to the coccyx measuring 3.7 x 4.1 cm could reflect phlegmon or abscess. Chronic bilateral decubitus ulcers at the level of the ischial tuberosities bilaterally with imaging findings suggestive of chronic infection/osteomyelitis, similar to prior. Hepatic steatosis and hepatomegaly with borderline splenomegaly. Nonobstructing left renal stones. Postoperative changes of left lower quadrant and colostomy and right mid abdominal ileal conduit formation. Diverticulosis. No diverticulitis or bowel obstruction. COVID-19 and flu A/B PCR on 2/12/2023 was negative.    Status post excisional debridement of sacral ulcer on 2/13/2023 with Dr. Taylor.           Assessment/Plan         ASSESSMENT:    Septic shock with  lactic acid greater than 4 on admission  Acute and chronic decubitus ulcerations with osteomyelitis and possible abscess      PLAN:    Patient sitting up in bed this morning eating breakfast.  No issues or complaints.  Currently on room air with no apparent distress.  Lungs clear to auscultation bilaterally.  Afebrile, denies increase in ostomy output.  No new labs this morning.    The patient is being treated for osteomyelitis and is currently receiving ceftriaxone at a dose of 2 g IV every 24 hours until 3/29/2023.  We will continue to follow closely.      Code Status:   Code Status and Medical Interventions:   Ordered at: 02/21/23 1034     Code Status (Patient has no pulse and is not breathing):    CPR (Attempt to Resuscitate)     Medical Interventions (Patient has pulse or is breathing):    Full Support       GUANACO Flores  03/04/23  09:59 EST

## 2023-03-04 NOTE — PLAN OF CARE
Goal Outcome Evaluation:              Outcome Evaluation: Pt is resting in bed at this time. Pt remained of JAXSON abbottout the shift. Wound vac remains in place and wound care completed per orders. Pt voices no complaints at this time. Will continue plan of care.

## 2023-03-05 LAB
GLUCOSE BLDC GLUCOMTR-MCNC: 172 MG/DL (ref 70–130)
GLUCOSE BLDC GLUCOMTR-MCNC: 188 MG/DL (ref 70–130)
GLUCOSE BLDC GLUCOMTR-MCNC: 211 MG/DL (ref 70–130)
GLUCOSE BLDC GLUCOMTR-MCNC: 245 MG/DL (ref 70–130)

## 2023-03-05 PROCEDURE — 99308 SBSQ NF CARE LOW MDM 20: CPT | Performed by: NURSE PRACTITIONER

## 2023-03-05 PROCEDURE — 63710000001 INSULIN DETEMIR PER 5 UNITS: Performed by: HOSPITALIST

## 2023-03-05 PROCEDURE — 63710000001 INSULIN ASPART PER 5 UNITS: Performed by: STUDENT IN AN ORGANIZED HEALTH CARE EDUCATION/TRAINING PROGRAM

## 2023-03-05 PROCEDURE — 25010000002 CEFTRIAXONE PER 250 MG: Performed by: STUDENT IN AN ORGANIZED HEALTH CARE EDUCATION/TRAINING PROGRAM

## 2023-03-05 PROCEDURE — 82962 GLUCOSE BLOOD TEST: CPT

## 2023-03-05 PROCEDURE — 99310 SBSQ NF CARE HIGH MDM 45: CPT | Performed by: HOSPITALIST

## 2023-03-05 RX ADMIN — DULOXETINE HYDROCHLORIDE 60 MG: 60 CAPSULE, DELAYED RELEASE ORAL at 20:11

## 2023-03-05 RX ADMIN — OXYCODONE HYDROCHLORIDE AND ACETAMINOPHEN 500 MG: 500 TABLET ORAL at 08:21

## 2023-03-05 RX ADMIN — CEFTRIAXONE 2 G: 2 INJECTION, POWDER, FOR SOLUTION INTRAMUSCULAR; INTRAVENOUS at 09:14

## 2023-03-05 RX ADMIN — MODAFINIL 200 MG: 100 TABLET ORAL at 08:21

## 2023-03-05 RX ADMIN — INSULIN DETEMIR 60 UNITS: 100 INJECTION, SOLUTION SUBCUTANEOUS at 08:22

## 2023-03-05 RX ADMIN — SACUBITRIL AND VALSARTAN 1 TABLET: 24; 26 TABLET, FILM COATED ORAL at 08:22

## 2023-03-05 RX ADMIN — NYSTATIN: 100000 POWDER TOPICAL at 08:22

## 2023-03-05 RX ADMIN — GABAPENTIN 800 MG: 400 CAPSULE ORAL at 20:11

## 2023-03-05 RX ADMIN — Medication 1 TABLET: at 08:21

## 2023-03-05 RX ADMIN — GABAPENTIN 800 MG: 400 CAPSULE ORAL at 15:11

## 2023-03-05 RX ADMIN — BACLOFEN 30 MG: 10 TABLET ORAL at 17:16

## 2023-03-05 RX ADMIN — DICYCLOMINE HYDROCHLORIDE 10 MG: 10 CAPSULE ORAL at 08:21

## 2023-03-05 RX ADMIN — EMPAGLIFLOZIN 10 MG: 10 TABLET, FILM COATED ORAL at 08:21

## 2023-03-05 RX ADMIN — GLIPIZIDE 5 MG: 5 TABLET ORAL at 06:32

## 2023-03-05 RX ADMIN — APIXABAN 5 MG: 5 TABLET, FILM COATED ORAL at 08:21

## 2023-03-05 RX ADMIN — INSULIN ASPART 30 UNITS: 100 INJECTION, SOLUTION INTRAVENOUS; SUBCUTANEOUS at 11:45

## 2023-03-05 RX ADMIN — INSULIN ASPART 2 UNITS: 100 INJECTION, SOLUTION INTRAVENOUS; SUBCUTANEOUS at 08:23

## 2023-03-05 RX ADMIN — BACLOFEN 30 MG: 10 TABLET ORAL at 11:45

## 2023-03-05 RX ADMIN — FERROUS SULFATE TAB 325 MG (65 MG ELEMENTAL FE) 325 MG: 325 (65 FE) TAB at 08:22

## 2023-03-05 RX ADMIN — NYSTATIN: 100000 POWDER TOPICAL at 20:14

## 2023-03-05 RX ADMIN — VITAMINS A AND D OINTMENT 1 APPLICATION: 15.5; 53.4 OINTMENT TOPICAL at 20:14

## 2023-03-05 RX ADMIN — VERICIGUAT 2.5 MG: 2.5 TABLET, FILM COATED ORAL at 08:22

## 2023-03-05 RX ADMIN — METFORMIN HYDROCHLORIDE 1000 MG: 500 TABLET ORAL at 08:21

## 2023-03-05 RX ADMIN — CARVEDILOL 12.5 MG: 6.25 TABLET, FILM COATED ORAL at 17:16

## 2023-03-05 RX ADMIN — ARIPIPRAZOLE 10 MG: 10 TABLET ORAL at 20:11

## 2023-03-05 RX ADMIN — PANTOPRAZOLE SODIUM 40 MG: 40 TABLET, DELAYED RELEASE ORAL at 08:21

## 2023-03-05 RX ADMIN — PANTOPRAZOLE SODIUM 40 MG: 40 TABLET, DELAYED RELEASE ORAL at 20:11

## 2023-03-05 RX ADMIN — BACLOFEN 30 MG: 10 TABLET ORAL at 08:21

## 2023-03-05 RX ADMIN — METFORMIN HYDROCHLORIDE 1000 MG: 500 TABLET ORAL at 17:16

## 2023-03-05 RX ADMIN — CARVEDILOL 12.5 MG: 6.25 TABLET, FILM COATED ORAL at 08:21

## 2023-03-05 RX ADMIN — DULOXETINE HYDROCHLORIDE 60 MG: 60 CAPSULE, DELAYED RELEASE ORAL at 08:21

## 2023-03-05 RX ADMIN — DICYCLOMINE HYDROCHLORIDE 10 MG: 10 CAPSULE ORAL at 20:11

## 2023-03-05 RX ADMIN — SUCRALFATE 1 G: 1 TABLET ORAL at 08:21

## 2023-03-05 RX ADMIN — INSULIN ASPART 30 UNITS: 100 INJECTION, SOLUTION INTRAVENOUS; SUBCUTANEOUS at 08:22

## 2023-03-05 RX ADMIN — INSULIN ASPART 30 UNITS: 100 INJECTION, SOLUTION INTRAVENOUS; SUBCUTANEOUS at 17:16

## 2023-03-05 RX ADMIN — Medication 10 ML: at 08:22

## 2023-03-05 RX ADMIN — BACLOFEN 30 MG: 10 TABLET ORAL at 20:11

## 2023-03-05 RX ADMIN — INSULIN ASPART 4 UNITS: 100 INJECTION, SOLUTION INTRAVENOUS; SUBCUTANEOUS at 11:45

## 2023-03-05 RX ADMIN — INSULIN DETEMIR 60 UNITS: 100 INJECTION, SOLUTION SUBCUTANEOUS at 20:11

## 2023-03-05 RX ADMIN — MAGNESIUM GLUCONATE 500 MG ORAL TABLET 1200 MG: 500 TABLET ORAL at 08:21

## 2023-03-05 RX ADMIN — APIXABAN 5 MG: 5 TABLET, FILM COATED ORAL at 20:11

## 2023-03-05 RX ADMIN — ATORVASTATIN CALCIUM 10 MG: 10 TABLET, FILM COATED ORAL at 20:11

## 2023-03-05 RX ADMIN — VITAMINS A AND D OINTMENT 1 APPLICATION: 15.5; 53.4 OINTMENT TOPICAL at 08:22

## 2023-03-05 RX ADMIN — Medication 1000 UNITS: at 08:21

## 2023-03-05 RX ADMIN — GLIPIZIDE 5 MG: 5 TABLET ORAL at 17:16

## 2023-03-05 RX ADMIN — SACUBITRIL AND VALSARTAN 1 TABLET: 24; 26 TABLET, FILM COATED ORAL at 20:11

## 2023-03-05 RX ADMIN — SPIRONOLACTONE 25 MG: 25 TABLET ORAL at 08:21

## 2023-03-05 RX ADMIN — Medication 1 CAPSULE: at 08:21

## 2023-03-05 RX ADMIN — INSULIN ASPART 4 UNITS: 100 INJECTION, SOLUTION INTRAVENOUS; SUBCUTANEOUS at 17:16

## 2023-03-05 RX ADMIN — Medication 10 ML: at 20:14

## 2023-03-05 RX ADMIN — BUMETANIDE 2 MG: 1 TABLET ORAL at 08:21

## 2023-03-05 RX ADMIN — GABAPENTIN 800 MG: 400 CAPSULE ORAL at 08:24

## 2023-03-05 NOTE — PLAN OF CARE
Goal Outcome Evaluation:              Outcome Evaluation: Pt is resting in bed at this time. Wound vac remains in place and wound care completed per orders. Pt voices no complaints at this time. Will continue plan of care.

## 2023-03-05 NOTE — PROGRESS NOTES
PROGRESS NOTE         Patient Identification:  Name:  Ken Krueger  Age:  47 y.o.  Sex:  male  :  1975  MRN:  [id number]  Visit Number:  [id number]  Primary Care Provider:  Franchesca Hodges APRN         LOS: 12 days       ----------------------------------------------------------------------------------------------------------------------  Subjective       Chief Complaints:    No chief complaint on file.        Interval History:      Patient sitting up in bed this morning on BiPAP.  Drowsy.  Refused overnight.  Afebrile, no increase in ostomy output.  Lungs clear to auscultation bilaterally.  Abdomen soft, nontender.    Review of Systems:    Constitutional: no fever, chills and night sweats.  No fatigue.  Eyes: no eye drainage, itching or redness.  HEENT: no mouth sores, dysphagia or nose bleed.  Respiratory: no for shortness of breath, cough, or production of sputum.  Cardiovascular: no chest pain, no palpitations, no orthopnea.  Gastrointestinal: no nausea, vomiting or diarrhea. No abdominal pain, hematemesis or rectal bleeding.  Genitourinary: no dysuria or polyuria.  Hematologic/lymphatic: no lymph node abnormalities, no easy bruising or easy bleeding.  Musculoskeletal: No muscle or joint pain.  Skin: No rash and no itching.  Decubitus ulcers.  Neurological: no loss of consciousness, no seizure, no headache.  Behavioral/Psych: no depression or suicidal ideation.  Endocrine: no hot flashes.  Immunologic: negative.    ----------------------------------------------------------------------------------------------------------------------      Objective       [facility] Meds:    Pharmacy Consult - Pharmacy to dose,       ----------------------------------------------------------------------------------------------------------------------    Vital Signs:  Temp:  [98.1 °F (36.7 °C)-98.3 °F (36.8 °C)] 98.3 °F (36.8 °C)  Heart Rate:  [84-98] 98  Resp:  [18-22] 22  BP: (108-123)/(62-73) 115/68  No  data found.  SpO2 Percentage    03/02/23 1900 03/03/23 1903 03/04/23 1900   SpO2: 96% 94% 94%     SpO2:  [94 %] 94 %  on   ;   Device (Oxygen Therapy): room air    Body mass index is 42.34 kg/m².  Wt Readings from Last 3 Encounters:   02/21/23 (!) 138 kg (303 lb 9.2 oz)   02/20/23 (!) 139 kg (306 lb 14.1 oz)   12/13/22 (!) 141 kg (310 lb 14.4 oz)        Intake/Output Summary (Last 24 hours) at 3/5/2023 1057  Last data filed at 3/5/2023 0300  Gross per 24 hour   Intake 1053.53 ml   Output 4550 ml   Net -3496.47 ml     Diet: Diabetic Diets; Consistent Carbohydrate; Texture: Regular Texture (IDDSI 7); Fluid Consistency: Thin (IDDSI 0)  ----------------------------------------------------------------------------------------------------------------------      Physical Exam:    Constitutional:  Well-developed, well-nourished.  On BiPAP this morning.  HENT:  Head: Normocephalic and atraumatic.  Mouth:  Moist mucous membranes.    Eyes:  Conjunctivae and EOM are normal.  No scleral icterus.  Neck:  Neck supple.  No JVD present.    Cardiovascular:  Normal rate, regular rhythm and normal heart sounds with no murmur. No edema.  Pulmonary/Chest:  No respiratory distress, no wheezes, no crackles, with normal breath sounds and good air movement.  Lungs clear to auscultation bilaterally  Abdominal: Obese.  Soft.  Bowel sounds are normal.  No distension and no tenderness.  Ileal conduit in place.  Colostomy bag in place with soft stool.  Musculoskeletal:  No tenderness.  No swelling or redness of joints.  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 PICC to right upper arm with no signs of infection.  Neurological:  Alert and oriented to person, place, and time.  No facial droop.  No slurred speech.   Skin:  Stage IV sacral decubitus wound status post excisional debridement.  Psychiatric:  Normal mood and affect.  Behavior is  normal.      ----------------------------------------------------------------------------------------------------------------------            Results from last 7 days   Lab Units 02/27/23  0156   WBC 10*3/mm3 10.04   HEMOGLOBIN g/dL 11.4*   HEMATOCRIT % 39.4   MCV fL 92.7   MCHC g/dL 28.9*   PLATELETS 10*3/mm3 276     Results from last 7 days   Lab Units 02/27/23  0156   SODIUM mmol/L 136   POTASSIUM mmol/L 4.4   CHLORIDE mmol/L 101   CO2 mmol/L 23.7   BUN mg/dL 38*   CREATININE mg/dL 0.88   CALCIUM mg/dL 9.9   GLUCOSE mg/dL 185*   ALBUMIN g/dL 3.3*   BILIRUBIN mg/dL 0.2   ALK PHOS U/L 117   AST (SGOT) U/L 29   ALT (SGPT) U/L 35   Estimated Creatinine Clearance: 149.9 mL/min (by C-G formula based on SCr of 0.88 mg/dL).  No results found for: AMMONIA    Glucose   Date/Time Value Ref Range Status   03/05/2023 0701 172 (H) 70 - 130 mg/dL Final     Comment:     Meter: [id number] : [id number] SHE CASTELLANO   03/04/2023 1940 202 (H) 70 - 130 mg/dL Final     Comment:     Meter: [id number] : [id number] WILBER BERTRAND   03/04/2023 1620 239 (H) 70 - 130 mg/dL Final     Comment:     Meter: [id number] : [id number] BIRGIT MULLER   03/04/2023 1053 284 (H) 70 - 130 mg/dL Final     Comment:     Meter: [id number] : [id number] BIRGIT MULLER   03/04/2023 0620 324 (H) 70 - 130 mg/dL Final     Comment:     Meter: [id number] : [id number] Maria Isabel Gorman   03/03/2023 2014 234 (H) 70 - 130 mg/dL Final     Comment:     Meter: [id number] : [id number] WILBER ELZA   03/03/2023 1618 219 (H) 70 - 130 mg/dL Final     Comment:     Meter: [id number] : [id number] leti montoya   03/03/2023 1119 316 (H) 70 - 130 mg/dL Final     Comment:     Meter: [id number] : [id number] leti montoya     Lab Results   Component Value Date    HGBA1C 11.50 (H) 02/13/2023     Lab Results   Component Value Date    TSH 3.780 07/19/2022    FREET4 1.31 06/02/2021       Blood Culture   Date Value Ref Range Status   02/12/2023 No  growth at 24 hours  Preliminary   02/12/2023 No growth at 24 hours  Preliminary     No results found for: URINECX  No results found for: WOUNDCX  No results found for: STOOLCX  No results found for: RESPCX  Pain Management Panel     Pain Management Panel Latest Ref Rng & Units 6/2/2021 8/19/2018    AMPHETAMINES SCREEN, URINE Negative Negative Negative    BARBITURATES SCREEN Negative Negative Negative    BENZODIAZEPINE SCREEN, URINE Negative Negative Negative    BUPRENORPHINEUR Negative Negative Negative    COCAINE SCREEN, URINE Negative Negative Negative    METHADONE SCREEN, URINE Negative Negative Negative            ----------------------------------------------------------------------------------------------------------------------  Imaging Results (Last 24 Hours)     ** No results found for the last 24 hours. **          -----------------------------------------------------------------------------------  Pertinent Infectious Disease Results     CT abdomen pelvis with contrast on 2/12/2023 showed there is a new decubitus ulcer just to the right of midline at the level of the coccyx with constellation of imaging findings most characteristic of acute infection/osteomyelitis. Small pocket of air and fluid adjacent to the coccyx measuring 3.7 x 4.1 cm could reflect phlegmon or abscess. Chronic bilateral decubitus ulcers at the level of the ischial tuberosities bilaterally with imaging findings suggestive of chronic infection/osteomyelitis, similar to prior. Hepatic steatosis and hepatomegaly with borderline splenomegaly. Nonobstructing left renal stones. Postoperative changes of left lower quadrant and colostomy and right mid abdominal ileal conduit formation. Diverticulosis. No diverticulitis or bowel obstruction. COVID-19 and flu A/B PCR on 2/12/2023 was negative.    Status post excisional debridement of sacral ulcer on 2/13/2023 with Dr. Taylor.           Assessment/Plan         ASSESSMENT:    Septic shock with  lactic acid greater than 4 on admission  Acute and chronic decubitus ulcerations with osteomyelitis and possible abscess      PLAN:    Patient sitting up in bed this morning on BiPAP.  Drowsy.  Refused overnight.  Afebrile, no increase in ostomy output.  Lungs clear to auscultation bilaterally.  Abdomen soft, nontender.    The patient is being treated for osteomyelitis and is currently receiving ceftriaxone at a dose of 2 g IV every 24 hours until 3/29/2023.  We will continue to follow closely.      Code Status:   Code Status and Medical Interventions:   Ordered at: 02/21/23 1034     Code Status (Patient has no pulse and is not breathing):    CPR (Attempt to Resuscitate)     Medical Interventions (Patient has pulse or is breathing):    Full Support       GUANACO Flores  03/05/23  10:57 EST

## 2023-03-05 NOTE — PLAN OF CARE
Goal Outcome Evaluation:  Plan of Care Reviewed With: patient        Progress: no change   No acute changes during shift will continue to monitor and follow current plan of care.

## 2023-03-05 NOTE — PROGRESS NOTES
[facility] HOSPITALIST PROGRESS NOTE     Patient Identification:  Name:  Ken Krueger  Age:  47 y.o.  Sex:  male  :  1975  MRN:  [id number]  Visit Number:  [id number]  Primary Care Provider:  Franchesca Hodges APRN    Length of stay:  12    Subjective: Patient seen and examined, he is awake, currently on CPAP, no complaints, Accu-Chek resolved recurrence of hypoglycemia necessitating increasing dose of Levemir with improvement.  Good urine output    Chief Complaint: Sacral decubitus  ----------------------------------------------------------------------------------------------------------------------  Current Hospital Meds:  Acidophilus/Pectin, 1 capsule, Oral, Daily  apixaban, 5 mg, Oral, Q12H  ARIPiprazole, 10 mg, Oral, Nightly  ascorbic acid, 500 mg, Oral, Daily  atorvastatin, 10 mg, Oral, Nightly  baclofen, 30 mg, Oral, 4x Daily With Meals & Nightly  bumetanide, 2 mg, Oral, Daily  carvedilol, 12.5 mg, Oral, BID With Meals  cefTRIAXone, 2 g, Intravenous, Q24H  cholecalciferol, 1,000 Units, Oral, Daily  dicyclomine, 10 mg, Oral, BID  DULoxetine, 60 mg, Oral, BID  empagliflozin, 10 mg, Oral, Daily  ferrous sulfate, 325 mg, Oral, Daily With Breakfast  gabapentin, 800 mg, Oral, TID  glipizide, 5 mg, Oral, BID AC  Insulin Aspart, 0-9 Units, Subcutaneous, TID AC  Insulin Aspart, 30 Units, Subcutaneous, TID With Meals  insulin detemir, 60 Units, Subcutaneous, Q12H  magic barrier cream, 1 application, Topical, BID  magnesium oxide, 1,200 mg, Oral, Daily  metFORMIN, 1,000 mg, Oral, BID With Meals  modafinil, 200 mg, Oral, Daily  multivitamin with minerals, 1 tablet, Oral, Daily  nystatin, , Topical, Q12H  pantoprazole, 40 mg, Oral, BID  sacubitril-valsartan, 1 tablet, Oral, Q12H  sodium chloride, 10 mL, Intravenous, Q12H  spironolactone, 25 mg, Oral, Daily  sucralfate, 1 g, Oral, Daily  [START ON 3/7/2023] tuberculin, 5 Units, Intradermal, Once  Vericiguat, 2.5 mg, Oral, Daily      Pharmacy Consult -  Pharmacy to dose,       ----------------------------------------------------------------------------------------------------------------------  Vital Signs:  Temp:  [98.1 °F (36.7 °C)-98.3 °F (36.8 °C)] 98.3 °F (36.8 °C)  Heart Rate:  [84-98] 98  Resp:  [18-22] 22  BP: (108-123)/(62-73) 115/68       Tele:       02/21/23  1000   Weight: (!) 138 kg (303 lb 9.2 oz)     Body mass index is 42.34 kg/m².    Intake/Output Summary (Last 24 hours) at 3/5/2023 1250  Last data filed at 3/5/2023 0300  Gross per 24 hour   Intake 1053.53 ml   Output 4550 ml   Net -3496.47 ml     Diet: Diabetic Diets; Consistent Carbohydrate; Texture: Regular Texture (IDDSI 7); Fluid Consistency: Thin (IDDSI 0)  ----------------------------------------------------------------------------------------------------------------------  Physical exam:  General: Awake and alert currently on CPAP, morbidly obese, comfortable,awake, alert, oriented to self, place, and time,  No respiratory distress.    Skin:  Skin is warm and dry. No rash noted. No pallor.    HENT:  Head:  Normocephalic and atraumatic.  Mouth:  Moist mucous membranes.    Eyes:  Conjunctivae and EOM are normal.  Pupils are equal, round, and reactive to light.  No scleral icterus.    Neck:  Neck supple.  No JVD present.    Pulmonary/Chest:  No respiratory distress, no wheezes, no crackles, with normal breath sounds and good air movement.  Cardiovascular:  Normal rate, regular rhythm and normal heart sounds with no murmur.  Abdominal: Left-sided colostomy, right-sided ileostomy with pale elvin urine, soft.  Bowel sounds are normal.  No distension and no tenderness.   Extremities:  No edema, no tenderness, and no deformity.  No red or swollen joints anywhere.  Strong pulses in all 4 extremities with no clubbing, no cyanosis, no edema.  Neurological: No slurring of speech, patient is paraplegic   genitourinary: Ileal conduit with  catheter  Back:  ----------------------------------------------------------------------------------------------------------------------  ----------------------------------------------------------------------------------------------------------------------      Results from last 7 days   Lab Units 02/27/23  0156   WBC 10*3/mm3 10.04   HEMOGLOBIN g/dL 11.4*   HEMATOCRIT % 39.4   MCV fL 92.7   MCHC g/dL 28.9*   PLATELETS 10*3/mm3 276         Results from last 7 days   Lab Units 02/27/23  0156   SODIUM mmol/L 136   POTASSIUM mmol/L 4.4   CHLORIDE mmol/L 101   CO2 mmol/L 23.7   BUN mg/dL 38*   CREATININE mg/dL 0.88   CALCIUM mg/dL 9.9   GLUCOSE mg/dL 185*   ALBUMIN g/dL 3.3*   BILIRUBIN mg/dL 0.2   ALK PHOS U/L 117   AST (SGOT) U/L 29   ALT (SGPT) U/L 35   Estimated Creatinine Clearance: 149.9 mL/min (by C-G formula based on SCr of 0.88 mg/dL).    No results found for: AMMONIA      No results found for: BLOODCX  No results found for: URINECX  No results found for: WOUNDCX  No results found for: STOOLCX    I have personally looked at the labs and they are summarized above.  ----------------------------------------------------------------------------------------------------------------------  Imaging Results (Last 24 Hours)     ** No results found for the last 24 hours. **        ----------------------------------------------------------------------------------------------------------------------  Assessment and Plan:  -Stage IV sacral decubiti with osteomyelitis present on admission E. coli on culture  -Morbidly obese complicating all aspects of care  -Diabetes type 2 with hyperglycemia  -Paraplegia  -History of PE on chronic anticoagulation  -ARLIN on CPAP  -History of nonischemic cardiomyopathy  -History of ileal conduit and colostomy    Levemir has been increased, continue wound care, patient has wound VAC, CPAP at night and as needed.    Disposition SNF      Yovana Pack MD  03/05/23  12:50 EST

## 2023-03-06 LAB
GLUCOSE BLDC GLUCOMTR-MCNC: 190 MG/DL (ref 70–130)
GLUCOSE BLDC GLUCOMTR-MCNC: 212 MG/DL (ref 70–130)
GLUCOSE BLDC GLUCOMTR-MCNC: 262 MG/DL (ref 70–130)
GLUCOSE BLDC GLUCOMTR-MCNC: 319 MG/DL (ref 70–130)

## 2023-03-06 PROCEDURE — 99308 SBSQ NF CARE LOW MDM 20: CPT | Performed by: INTERNAL MEDICINE

## 2023-03-06 PROCEDURE — 82962 GLUCOSE BLOOD TEST: CPT

## 2023-03-06 PROCEDURE — 63710000001 INSULIN DETEMIR PER 5 UNITS: Performed by: INTERNAL MEDICINE

## 2023-03-06 PROCEDURE — 63710000001 INSULIN DETEMIR PER 5 UNITS: Performed by: HOSPITALIST

## 2023-03-06 PROCEDURE — 25010000002 CEFTRIAXONE PER 250 MG: Performed by: STUDENT IN AN ORGANIZED HEALTH CARE EDUCATION/TRAINING PROGRAM

## 2023-03-06 PROCEDURE — 63710000001 INSULIN ASPART PER 5 UNITS: Performed by: STUDENT IN AN ORGANIZED HEALTH CARE EDUCATION/TRAINING PROGRAM

## 2023-03-06 RX ORDER — INSULIN ASPART 100 [IU]/ML
0-14 INJECTION, SOLUTION INTRAVENOUS; SUBCUTANEOUS
Status: DISCONTINUED | OUTPATIENT
Start: 2023-03-07 | End: 2023-03-08

## 2023-03-06 RX ORDER — NICOTINE POLACRILEX 4 MG
15 LOZENGE BUCCAL
Status: DISCONTINUED | OUTPATIENT
Start: 2023-03-06 | End: 2023-03-06

## 2023-03-06 RX ORDER — DEXTROSE MONOHYDRATE 25 G/50ML
25 INJECTION, SOLUTION INTRAVENOUS
Status: DISCONTINUED | OUTPATIENT
Start: 2023-03-06 | End: 2023-03-06

## 2023-03-06 RX ADMIN — APIXABAN 5 MG: 5 TABLET, FILM COATED ORAL at 08:17

## 2023-03-06 RX ADMIN — BACLOFEN 30 MG: 10 TABLET ORAL at 08:18

## 2023-03-06 RX ADMIN — GABAPENTIN 800 MG: 400 CAPSULE ORAL at 15:09

## 2023-03-06 RX ADMIN — MODAFINIL 200 MG: 100 TABLET ORAL at 08:18

## 2023-03-06 RX ADMIN — DULOXETINE HYDROCHLORIDE 60 MG: 60 CAPSULE, DELAYED RELEASE ORAL at 08:17

## 2023-03-06 RX ADMIN — INSULIN ASPART 2 UNITS: 100 INJECTION, SOLUTION INTRAVENOUS; SUBCUTANEOUS at 17:19

## 2023-03-06 RX ADMIN — BUMETANIDE 2 MG: 1 TABLET ORAL at 08:17

## 2023-03-06 RX ADMIN — ATORVASTATIN CALCIUM 10 MG: 10 TABLET, FILM COATED ORAL at 20:50

## 2023-03-06 RX ADMIN — METFORMIN HYDROCHLORIDE 1000 MG: 500 TABLET ORAL at 17:19

## 2023-03-06 RX ADMIN — GLIPIZIDE 5 MG: 5 TABLET ORAL at 08:17

## 2023-03-06 RX ADMIN — DICYCLOMINE HYDROCHLORIDE 10 MG: 10 CAPSULE ORAL at 08:17

## 2023-03-06 RX ADMIN — EMPAGLIFLOZIN 10 MG: 10 TABLET, FILM COATED ORAL at 08:17

## 2023-03-06 RX ADMIN — VITAMINS A AND D OINTMENT 1 APPLICATION: 15.5; 53.4 OINTMENT TOPICAL at 20:50

## 2023-03-06 RX ADMIN — SACUBITRIL AND VALSARTAN 1 TABLET: 24; 26 TABLET, FILM COATED ORAL at 08:18

## 2023-03-06 RX ADMIN — BACLOFEN 30 MG: 10 TABLET ORAL at 20:51

## 2023-03-06 RX ADMIN — Medication 1000 UNITS: at 08:17

## 2023-03-06 RX ADMIN — INSULIN ASPART 30 UNITS: 100 INJECTION, SOLUTION INTRAVENOUS; SUBCUTANEOUS at 17:19

## 2023-03-06 RX ADMIN — OXYCODONE HYDROCHLORIDE AND ACETAMINOPHEN 500 MG: 500 TABLET ORAL at 08:17

## 2023-03-06 RX ADMIN — APIXABAN 5 MG: 5 TABLET, FILM COATED ORAL at 20:50

## 2023-03-06 RX ADMIN — Medication 10 ML: at 20:51

## 2023-03-06 RX ADMIN — FERROUS SULFATE TAB 325 MG (65 MG ELEMENTAL FE) 325 MG: 325 (65 FE) TAB at 08:18

## 2023-03-06 RX ADMIN — Medication 10 ML: at 08:18

## 2023-03-06 RX ADMIN — CARVEDILOL 12.5 MG: 6.25 TABLET, FILM COATED ORAL at 17:19

## 2023-03-06 RX ADMIN — SUCRALFATE 1 G: 1 TABLET ORAL at 08:19

## 2023-03-06 RX ADMIN — SACUBITRIL AND VALSARTAN 1 TABLET: 24; 26 TABLET, FILM COATED ORAL at 20:49

## 2023-03-06 RX ADMIN — GABAPENTIN 800 MG: 400 CAPSULE ORAL at 20:50

## 2023-03-06 RX ADMIN — INSULIN ASPART 30 UNITS: 100 INJECTION, SOLUTION INTRAVENOUS; SUBCUTANEOUS at 11:16

## 2023-03-06 RX ADMIN — METFORMIN HYDROCHLORIDE 1000 MG: 500 TABLET ORAL at 08:17

## 2023-03-06 RX ADMIN — DICYCLOMINE HYDROCHLORIDE 10 MG: 10 CAPSULE ORAL at 20:49

## 2023-03-06 RX ADMIN — PANTOPRAZOLE SODIUM 40 MG: 40 TABLET, DELAYED RELEASE ORAL at 20:50

## 2023-03-06 RX ADMIN — INSULIN DETEMIR 65 UNITS: 100 INJECTION, SOLUTION SUBCUTANEOUS at 20:49

## 2023-03-06 RX ADMIN — Medication 1 TABLET: at 08:17

## 2023-03-06 RX ADMIN — INSULIN ASPART 30 UNITS: 100 INJECTION, SOLUTION INTRAVENOUS; SUBCUTANEOUS at 08:18

## 2023-03-06 RX ADMIN — INSULIN ASPART 6 UNITS: 100 INJECTION, SOLUTION INTRAVENOUS; SUBCUTANEOUS at 08:19

## 2023-03-06 RX ADMIN — INSULIN DETEMIR 60 UNITS: 100 INJECTION, SOLUTION SUBCUTANEOUS at 08:18

## 2023-03-06 RX ADMIN — VITAMINS A AND D OINTMENT 1 APPLICATION: 15.5; 53.4 OINTMENT TOPICAL at 08:18

## 2023-03-06 RX ADMIN — GLIPIZIDE 5 MG: 5 TABLET ORAL at 17:19

## 2023-03-06 RX ADMIN — PANTOPRAZOLE SODIUM 40 MG: 40 TABLET, DELAYED RELEASE ORAL at 08:17

## 2023-03-06 RX ADMIN — INSULIN ASPART 7 UNITS: 100 INJECTION, SOLUTION INTRAVENOUS; SUBCUTANEOUS at 11:16

## 2023-03-06 RX ADMIN — NYSTATIN: 100000 POWDER TOPICAL at 08:18

## 2023-03-06 RX ADMIN — BACLOFEN 30 MG: 10 TABLET ORAL at 17:19

## 2023-03-06 RX ADMIN — GABAPENTIN 800 MG: 400 CAPSULE ORAL at 08:17

## 2023-03-06 RX ADMIN — VERICIGUAT 2.5 MG: 2.5 TABLET, FILM COATED ORAL at 08:18

## 2023-03-06 RX ADMIN — SPIRONOLACTONE 25 MG: 25 TABLET ORAL at 08:18

## 2023-03-06 RX ADMIN — Medication 1 CAPSULE: at 08:17

## 2023-03-06 RX ADMIN — BACLOFEN 30 MG: 10 TABLET ORAL at 11:16

## 2023-03-06 RX ADMIN — NYSTATIN: 100000 POWDER TOPICAL at 20:50

## 2023-03-06 RX ADMIN — CEFTRIAXONE 2 G: 2 INJECTION, POWDER, FOR SOLUTION INTRAMUSCULAR; INTRAVENOUS at 08:19

## 2023-03-06 RX ADMIN — ARIPIPRAZOLE 10 MG: 10 TABLET ORAL at 20:50

## 2023-03-06 RX ADMIN — DULOXETINE HYDROCHLORIDE 60 MG: 60 CAPSULE, DELAYED RELEASE ORAL at 20:49

## 2023-03-06 RX ADMIN — MAGNESIUM GLUCONATE 500 MG ORAL TABLET 1200 MG: 500 TABLET ORAL at 08:17

## 2023-03-06 NOTE — PAYOR COMM NOTE
"Helder Fenton RN  Swing Bed Nurse  [phone] Ex [phone] FAX: [phone]  [email or web address]  [facility]  NPI [id number]  Reference Number [id number]      Patient admitted to swing bed for IV antibiotics via PICC line through 3/29/23 and wound care . Requesting additional days to continue current treatments        Ken Deng (47 y.o. Male)    YOB: 1975  Social Security Number:   Address: [address] [place] [address]  Home Phone: [phone]  MRN: [id number]  Yarsanism: Evangelical  Marital Status:         Admission Date: 2/21/23  Admission Type: Urgent  Admitting Provider: Robinson Yanes DO  Attending Provider: Brannon Adrian MD  Department, Room/Bed: [address]  Discharge Date:   Discharge Disposition:   Discharge Destination:               Attending Provider: Brannon Adrian MD    Allergies: Keflex [Cephalexin]    Isolation: None   Infection: None   Code Status: CPR    Ht: 180.3 cm (71\")   Wt: 138 kg (303 lb 9.2 oz)    Admission Cmt: None   Principal Problem: Osteomyelitis (HCC) [M86.9]                 Active Insurance as of 2/21/2023     Primary Coverage     Payor Plan Insurance Group Employer/Plan Group    [facility] MEDICARE REPLACEMENT [employer] MEDICARE REPLACEMENT      Payor Plan Address Payor Plan Phone Number Payor Plan Fax Number Effective Dates    [address] [address] [phone]  5/1/2021 - None Entered    [facility] [id number]       Subscriber Name Subscriber Birth Date Member ID       KEN DENG 1975 [id number]           Secondary Coverage     Payor Plan Insurance Group Employer/Plan Group    [place]      Payor Plan Address Payor Plan Phone Number Payor Plan Fax Number Effective Dates    [address] [phone]  7/13/2019 - None Entered    [facility] [address]       Subscriber Name Subscriber Birth Date Member ID       KEN DENG 1975 [id number]                 Emergency " Contacts      (Rel.) Home Phone Work Phone Mobile Phone    [employer] (Power of ) [phone] None None              Current Facility-Administered Medications   Medication Dose Route Frequency Provider Last Rate Last Admin   • acetaminophen (TYLENOL) tablet 650 mg  650 mg Oral Q4H PRN Robinson Yanes DO        Or   • acetaminophen (TYLENOL) 160 MG/5ML solution 650 mg  650 mg Oral Q4H PRN Robinson Yanes DO        Or   • acetaminophen (TYLENOL) suppository 650 mg  650 mg Rectal Q4H PRN Robinson Yanes DO       • Acidophilus/Pectin capsule 1 capsule  1 capsule Oral Daily Robinson Yanes DO   1 capsule at 03/06/23 0817   • alteplase (CATHFLO/ACTIVASE) INTRACATHETER injection 2 mg  2 mg Intracatheter PRN Carlin Landers MD       • apixaban (ELIQUIS) tablet 5 mg  5 mg Oral Q12H Robinson Yanes DO   5 mg at 03/06/23 0817   • ARIPiprazole (ABILIFY) tablet 10 mg  10 mg Oral Nightly Robinson Yanes DO   10 mg at 03/05/23 2011   • ascorbic acid (VITAMIN C) tablet 500 mg  500 mg Oral Daily Robinson Yanes DO   500 mg at 03/06/23 0817   • atorvastatin (LIPITOR) tablet 10 mg  10 mg Oral Nightly Robinson Yanes DO   10 mg at 03/05/23 2011   • baclofen (LIORESAL) tablet 30 mg  30 mg Oral 4x Daily With Meals & Nightly Robinson Yanes DO   30 mg at 03/06/23 0818   • bumetanide (BUMEX) tablet 2 mg  2 mg Oral Daily Robinson Yanes DO   2 mg at 03/06/23 0817   • carvedilol (COREG) tablet 12.5 mg  12.5 mg Oral BID With Meals Robinson Yanes DO   12.5 mg at 03/05/23 1716   • cefTRIAXone (ROCEPHIN) 2 g in sodium chloride 0.9 % 100 mL IVPB-VTB  2 g Intravenous Q24H Robinson Yanes  mL/hr at 03/06/23 0819 2 g at 03/06/23 0819   • cholecalciferol (VITAMIN D3) tablet 1,000 Units  1,000 Units Oral Daily Robinson Yanes DO   1,000 Units at 03/06/23 0817   • dextrose (D50W) (25 g/50 mL) IV injection 25 g  25 g Intravenous Q15 Min PRN Robinson Yanes DO       • dextrose (GLUTOSE) oral gel 15 g  15 g Oral Q15  Min PRN Robinson Yanes DO       • dicyclomine (BENTYL) capsule 10 mg  10 mg Oral BID Robinson Yanes DO   10 mg at 03/06/23 0817   • DULoxetine (CYMBALTA) DR capsule 60 mg  60 mg Oral BID Robinson Yanes DO   60 mg at 03/06/23 0817   • empagliflozin (JARDIANCE) tablet 10 mg  10 mg Oral Daily Robinson Yanes DO   10 mg at 03/06/23 0817   • ferrous sulfate tablet 325 mg  325 mg Oral Daily With Breakfast Robinson Yanes DO   325 mg at 03/06/23 0818   • gabapentin (NEURONTIN) capsule 800 mg  800 mg Oral TID Robinson Yanes DO   800 mg at 03/06/23 0817   • glipizide (GLUCOTROL) tablet 5 mg  5 mg Oral BID AC Robinson Yanes DO   5 mg at 03/06/23 0817   • glucagon (human recombinant) (GLUCAGEN DIAGNOSTIC) injection 1 mg  1 mg Intramuscular Q15 Min PRN Robinson Yanes DO       • Insulin Aspart (novoLOG) injection 0-9 Units  0-9 Units Subcutaneous TID AC Robinson Yanes DO   6 Units at 03/06/23 0819   • Insulin Aspart (novoLOG) injection 30 Units  30 Units Subcutaneous TID With Meals Robinson Yanes DO   30 Units at 03/06/23 0818   • insulin detemir (LEVEMIR) injection 60 Units  60 Units Subcutaneous Q12H Yovana Pack MD   60 Units at 03/06/23 0818   • magic barrier cream 1 application  1 application Topical PRN Robinson Yanes DO       • magic barrier cream 1 application  1 application Topical BID Robinson Yanes DO   1 application at 03/06/23 0818   • magnesium oxide (MAG-OX) tablet 1,200 mg  1,200 mg Oral Daily Robinson Yanes DO   1,200 mg at 03/06/23 0817   • metFORMIN (GLUCOPHAGE) tablet 1,000 mg  1,000 mg Oral BID With Meals Robinson Yanes DO   1,000 mg at 03/06/23 0817   • modafinil (PROVIGIL) tablet 200 mg  200 mg Oral Daily Robinson Yanes DO   200 mg at 03/06/23 0818   • multivitamin with minerals 1 tablet  1 tablet Oral Daily Robinson Yanes DO   1 tablet at 03/06/23 0817   • nitroglycerin (NITROSTAT) SL tablet 0.4 mg  0.4 mg Sublingual Q5 Min PRN Robinson Yanes DO       • nystatin (MYCOSTATIN) powder    Topical Q12H Robinson Yanes DO   Given at 23   • pantoprazole (PROTONIX) EC tablet 40 mg  40 mg Oral BID Robinson Yanes DO   40 mg at 23   • Pharmacy Consult - Pharmacy to dose   Does not apply Continuous PRN Robinson Yanes DO       • potassium chloride (K-DUR,KLOR-CON) CR tablet 40 mEq  40 mEq Oral PRN Robinson Yanes DO        Or   • potassium chloride (KLOR-CON) packet 40 mEq  40 mEq Oral PRN Robinson Yanes DO        Or   • potassium chloride 10 mEq in 100 mL IVPB  10 mEq Intravenous Q1H PRN Robinson Yanes DO       • sacubitril-valsartan (ENTRESTO) 24-26 MG tablet 1 tablet  1 tablet Oral Q12H Robinson Yanes DO   1 tablet at 23   • sodium chloride 0.9 % flush 10 mL  10 mL Intravenous Q12H Robinson Yanes DO   10 mL at 23   • sodium chloride 0.9 % flush 10 mL  10 mL Intravenous PRN Robinson Yanes DO   10 mL at 23   • sodium chloride 0.9 % infusion 40 mL  40 mL Intravenous PRN Robinson Yanes DO       • spironolactone (ALDACTONE) tablet 25 mg  25 mg Oral Daily Robinson Yanes DO   25 mg at 23   • sucralfate (CARAFATE) tablet 1 g  1 g Oral Daily Robinson Yanes DO   1 g at 23   • [START ON 3/7/2023] tuberculin injection 5 Units  5 Units Intradermal Once Robinson Yanes DO       • Vericiguat tablet 2.5 mg  2.5 mg Oral Daily Robinson Yanes DO   2.5 mg at 23     Operative/Procedure Notes (most recent note)    No notes of this type exist for this encounter.            Physician Progress Notes (last 72 hours)      Yovana Pack MD at 23 1250              [facility]     Patient Identification:  Name:  Ken Krueger  Age:  47 y.o.  Sex:  male  :  1975  MRN:  [id number]  Visit Number:  [id number]  Primary Care Provider:  Franchesca Hodges APRN    Length of stay:  12    Subjective: Patient seen and examined, he is awake, currently on CPAP, no complaints, Accu-Chek resolved  recurrence of hypoglycemia necessitating increasing dose of Levemir with improvement.  Good urine output    Chief Complaint: Sacral decubitus  ----------------------------------------------------------------------------------------------------------------------  Current Hospital Meds:  Acidophilus/Pectin, 1 capsule, Oral, Daily  apixaban, 5 mg, Oral, Q12H  ARIPiprazole, 10 mg, Oral, Nightly  ascorbic acid, 500 mg, Oral, Daily  atorvastatin, 10 mg, Oral, Nightly  baclofen, 30 mg, Oral, 4x Daily With Meals & Nightly  bumetanide, 2 mg, Oral, Daily  carvedilol, 12.5 mg, Oral, BID With Meals  cefTRIAXone, 2 g, Intravenous, Q24H  cholecalciferol, 1,000 Units, Oral, Daily  dicyclomine, 10 mg, Oral, BID  DULoxetine, 60 mg, Oral, BID  empagliflozin, 10 mg, Oral, Daily  ferrous sulfate, 325 mg, Oral, Daily With Breakfast  gabapentin, 800 mg, Oral, TID  glipizide, 5 mg, Oral, BID AC  Insulin Aspart, 0-9 Units, Subcutaneous, TID AC  Insulin Aspart, 30 Units, Subcutaneous, TID With Meals  insulin detemir, 60 Units, Subcutaneous, Q12H  magic barrier cream, 1 application, Topical, BID  magnesium oxide, 1,200 mg, Oral, Daily  metFORMIN, 1,000 mg, Oral, BID With Meals  modafinil, 200 mg, Oral, Daily  multivitamin with minerals, 1 tablet, Oral, Daily  nystatin, , Topical, Q12H  pantoprazole, 40 mg, Oral, BID  sacubitril-valsartan, 1 tablet, Oral, Q12H  sodium chloride, 10 mL, Intravenous, Q12H  spironolactone, 25 mg, Oral, Daily  sucralfate, 1 g, Oral, Daily  [START ON 3/7/2023] tuberculin, 5 Units, Intradermal, Once  Vericiguat, 2.5 mg, Oral, Daily      Pharmacy Consult - Pharmacy to dose,       ----------------------------------------------------------------------------------------------------------------------  Vital Signs:  Temp:  [98.1 °F (36.7 °C)-98.3 °F (36.8 °C)] 98.3 °F (36.8 °C)  Heart Rate:  [84-98] 98  Resp:  [18-22] 22  BP: (108-123)/(62-73) 115/68       Tele:       02/21/23  1000   Weight: (!) 138 kg (303 lb 9.2 oz)      Body mass index is 42.34 kg/m².    Intake/Output Summary (Last 24 hours) at 3/5/2023 1250  Last data filed at 3/5/2023 0300  Gross per 24 hour   Intake 1053.53 ml   Output 4550 ml   Net -3496.47 ml     Diet: Diabetic Diets; Consistent Carbohydrate; Texture: Regular Texture (IDDSI 7); Fluid Consistency: Thin (IDDSI 0)  ----------------------------------------------------------------------------------------------------------------------  Physical exam:  General: Awake and alert currently on CPAP, morbidly obese, comfortable,awake, alert, oriented to self, place, and time,  No respiratory distress.    Skin:  Skin is warm and dry. No rash noted. No pallor.    HENT:  Head:  Normocephalic and atraumatic.  Mouth:  Moist mucous membranes.    Eyes:  Conjunctivae and EOM are normal.  Pupils are equal, round, and reactive to light.  No scleral icterus.    Neck:  Neck supple.  No JVD present.    Pulmonary/Chest:  No respiratory distress, no wheezes, no crackles, with normal breath sounds and good air movement.  Cardiovascular:  Normal rate, regular rhythm and normal heart sounds with no murmur.  Abdominal: Left-sided colostomy, right-sided ileostomy with pale elvin urine, soft.  Bowel sounds are normal.  No distension and no tenderness.   Extremities:  No edema, no tenderness, and no deformity.  No red or swollen joints anywhere.  Strong pulses in all 4 extremities with no clubbing, no cyanosis, no edema.  Neurological: No slurring of speech, patient is paraplegic   genitourinary: Ileal conduit with catheter  Back:  ----------------------------------------------------------------------------------------------------------------------  ----------------------------------------------------------------------------------------------------------------------      Results from last 7 days   Lab Units 02/27/23  0156   WBC 10*3/mm3 10.04   HEMOGLOBIN g/dL 11.4*   HEMATOCRIT % 39.4   MCV fL 92.7   MCHC g/dL 28.9*   PLATELETS 10*3/mm3  276         Results from last 7 days   Lab Units 23  0156   SODIUM mmol/L 136   POTASSIUM mmol/L 4.4   CHLORIDE mmol/L 101   CO2 mmol/L 23.7   BUN mg/dL 38*   CREATININE mg/dL 0.88   CALCIUM mg/dL 9.9   GLUCOSE mg/dL 185*   ALBUMIN g/dL 3.3*   BILIRUBIN mg/dL 0.2   ALK PHOS U/L 117   AST (SGOT) U/L 29   ALT (SGPT) U/L 35   Estimated Creatinine Clearance: 149.9 mL/min (by C-G formula based on SCr of 0.88 mg/dL).    No results found for: AMMONIA      No results found for: BLOODCX  No results found for: URINECX  No results found for: WOUNDCX  No results found for: STOOLCX    I have personally looked at the labs and they are summarized above.  ----------------------------------------------------------------------------------------------------------------------  Imaging Results (Last 24 Hours)     ** No results found for the last 24 hours. **        ----------------------------------------------------------------------------------------------------------------------  Assessment and Plan:  -Stage IV sacral decubiti with osteomyelitis present on admission E. coli on culture  -Morbidly obese complicating all aspects of care  -Diabetes type 2 with hyperglycemia  -Paraplegia  -History of PE on chronic anticoagulation  -ARLIN on CPAP  -History of nonischemic cardiomyopathy  -History of ileal conduit and colostomy    Levemir has been increased, continue wound care, patient has wound VAC, CPAP at night and as needed.    Disposition SNF      Yovana Pack MD  23  12:50 EST    Electronically signed by Yovana Pack MD at 23 1252     Verenice Deluca APRN at 23 1057                     PROGRESS NOTE         Patient Identification:  Name:  Ken rKueger  Age:  47 y.o.  Sex:  male  :  1975  MRN:  [id number]  Visit Number:  [id number]  Primary Care Provider:  Franchesca Hodges APRN         LOS: 12 days        ----------------------------------------------------------------------------------------------------------------------  Subjective       Chief Complaints:    No chief complaint on file.        Interval History:      Patient sitting up in bed this morning on BiPAP.  Drowsy.  Refused overnight.  Afebrile, no increase in ostomy output.  Lungs clear to auscultation bilaterally.  Abdomen soft, nontender.    Review of Systems:    Constitutional: no fever, chills and night sweats.  No fatigue.  Eyes: no eye drainage, itching or redness.  HEENT: no mouth sores, dysphagia or nose bleed.  Respiratory: no for shortness of breath, cough, or production of sputum.  Cardiovascular: no chest pain, no palpitations, no orthopnea.  Gastrointestinal: no nausea, vomiting or diarrhea. No abdominal pain, hematemesis or rectal bleeding.  Genitourinary: no dysuria or polyuria.  Hematologic/lymphatic: no lymph node abnormalities, no easy bruising or easy bleeding.  Musculoskeletal: No muscle or joint pain.  Skin: No rash and no itching.  Decubitus ulcers.  Neurological: no loss of consciousness, no seizure, no headache.  Behavioral/Psych: no depression or suicidal ideation.  Endocrine: no hot flashes.  Immunologic: negative.    ----------------------------------------------------------------------------------------------------------------------      Objective       Current Hospital Meds:    Pharmacy Consult - Pharmacy to dose,       ----------------------------------------------------------------------------------------------------------------------    Vital Signs:  Temp:  [98.1 °F (36.7 °C)-98.3 °F (36.8 °C)] 98.3 °F (36.8 °C)  Heart Rate:  [84-98] 98  Resp:  [18-22] 22  BP: (108-123)/(62-73) 115/68  No data found.  SpO2 Percentage    03/02/23 1900 03/03/23 1903 03/04/23 1900   SpO2: 96% 94% 94%     SpO2:  [94 %] 94 %  on   ;   Device (Oxygen Therapy): room air    Body mass index is 42.34 kg/m².  Wt Readings from Last 3 Encounters:    02/21/23 (!) 138 kg (303 lb 9.2 oz)   02/20/23 (!) 139 kg (306 lb 14.1 oz)   12/13/22 (!) 141 kg (310 lb 14.4 oz)        Intake/Output Summary (Last 24 hours) at 3/5/2023 1057  Last data filed at 3/5/2023 0300  Gross per 24 hour   Intake 1053.53 ml   Output 4550 ml   Net -3496.47 ml     Diet: Diabetic Diets; Consistent Carbohydrate; Texture: Regular Texture (IDDSI 7); Fluid Consistency: Thin (IDDSI 0)  ----------------------------------------------------------------------------------------------------------------------      Physical Exam:    Constitutional:  Well-developed, well-nourished.  On BiPAP this morning.  HENT:  Head: Normocephalic and atraumatic.  Mouth:  Moist mucous membranes.    Eyes:  Conjunctivae and EOM are normal.  No scleral icterus.  Neck:  Neck supple.  No JVD present.    Cardiovascular:  Normal rate, regular rhythm and normal heart sounds with no murmur. No edema.  Pulmonary/Chest:  No respiratory distress, no wheezes, no crackles, with normal breath sounds and good air movement.  Lungs clear to auscultation bilaterally  Abdominal: Obese.  Soft.  Bowel sounds are normal.  No distension and no tenderness.  Ileal conduit in place.  Colostomy bag in place with soft stool.  Musculoskeletal:  No tenderness.  No swelling or redness of joints.  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 PICC to right upper arm with no signs of infection.  Neurological:  Alert and oriented to person, place, and time.  No facial droop.  No slurred speech.   Skin:  Stage IV sacral decubitus wound status post excisional debridement.  Psychiatric:  Normal mood and affect.  Behavior is normal.      ----------------------------------------------------------------------------------------------------------------------            Results from last 7 days   Lab Units 02/27/23  0156   WBC 10*3/mm3 10.04   HEMOGLOBIN g/dL 11.4*   HEMATOCRIT % 39.4   MCV fL 92.7   MCHC g/dL 28.9*   PLATELETS 10*3/mm3 276     Results from last 7 days   Lab Units  02/27/23  0156   SODIUM mmol/L 136   POTASSIUM mmol/L 4.4   CHLORIDE mmol/L 101   CO2 mmol/L 23.7   BUN mg/dL 38*   CREATININE mg/dL 0.88   CALCIUM mg/dL 9.9   GLUCOSE mg/dL 185*   ALBUMIN g/dL 3.3*   BILIRUBIN mg/dL 0.2   ALK PHOS U/L 117   AST (SGOT) U/L 29   ALT (SGPT) U/L 35   Estimated Creatinine Clearance: 149.9 mL/min (by C-G formula based on SCr of 0.88 mg/dL).  No results found for: AMMONIA    Glucose   Date/Time Value Ref Range Status   03/05/2023 0701 172 (H) 70 - 130 mg/dL Final     Comment:     Meter: [id number] : [id number] SHE CASTELLANO   03/04/2023 1940 202 (H) 70 - 130 mg/dL Final     Comment:     Meter: [id number] : [id number] WILBER BERTRAND   03/04/2023 1620 239 (H) 70 - 130 mg/dL Final     Comment:     Meter: [id number] : [id number] BIRGIT RENZO   03/04/2023 1053 284 (H) 70 - 130 mg/dL Final     Comment:     Meter: [id number] : [id number] BIRGIT RENZO   03/04/2023 0620 324 (H) 70 - 130 mg/dL Final     Comment:     Meter: [id number] : [id number] Maria Isabel Gorman   03/03/2023 2014 234 (H) 70 - 130 mg/dL Final     Comment:     Meter: [id number] : [id number] WILBER BERTRAND   03/03/2023 1618 219 (H) 70 - 130 mg/dL Final     Comment:     Meter: [id number] : [id number] leti montoya   03/03/2023 1119 316 (H) 70 - 130 mg/dL Final     Comment:     Meter: [id number] : [id number] leti montoya     Lab Results   Component Value Date    HGBA1C 11.50 (H) 02/13/2023     Lab Results   Component Value Date    TSH 3.780 07/19/2022    FREET4 1.31 06/02/2021       Blood Culture   Date Value Ref Range Status   02/12/2023 No growth at 24 hours  Preliminary   02/12/2023 No growth at 24 hours  Preliminary     No results found for: URINECX  No results found for: WOUNDCX  No results found for: STOOLCX  No results found for: RESPCX  Pain Management Panel     Pain Management Panel Latest Ref Rng & Units 6/2/2021 8/19/2018    AMPHETAMINES SCREEN, URINE Negative Negative Negative     BARBITURATES SCREEN Negative Negative Negative    BENZODIAZEPINE SCREEN, URINE Negative Negative Negative    BUPRENORPHINEUR Negative Negative Negative    COCAINE SCREEN, URINE Negative Negative Negative    METHADONE SCREEN, URINE Negative Negative Negative            ----------------------------------------------------------------------------------------------------------------------  Imaging Results (Last 24 Hours)     ** No results found for the last 24 hours. **          -----------------------------------------------------------------------------------  Pertinent Infectious Disease Results     CT abdomen pelvis with contrast on 2/12/2023 showed there is a new decubitus ulcer just to the right of midline at the level of the coccyx with constellation of imaging findings most characteristic of acute infection/osteomyelitis. Small pocket of air and fluid adjacent to the coccyx measuring 3.7 x 4.1 cm could reflect phlegmon or abscess. Chronic bilateral decubitus ulcers at the level of the ischial tuberosities bilaterally with imaging findings suggestive of chronic infection/osteomyelitis, similar to prior. Hepatic steatosis and hepatomegaly with borderline splenomegaly. Nonobstructing left renal stones. Postoperative changes of left lower quadrant and colostomy and right mid abdominal ileal conduit formation. Diverticulosis. No diverticulitis or bowel obstruction. COVID-19 and flu A/B PCR on 2/12/2023 was negative.    Status post excisional debridement of sacral ulcer on 2/13/2023 with Dr. Taylor.           Assessment/Plan         ASSESSMENT:    Septic shock with lactic acid greater than 4 on admission  Acute and chronic decubitus ulcerations with osteomyelitis and possible abscess      PLAN:    Patient sitting up in bed this morning on BiPAP.  Drowsy.  Refused overnight.  Afebrile, no increase in ostomy output.  Lungs clear to auscultation bilaterally.  Abdomen soft, nontender.    The patient is being treated for  osteomyelitis and is currently receiving ceftriaxone at a dose of 2 g IV every 24 hours until 3/29/2023.  We will continue to follow closely.      Code Status:   Code Status and Medical Interventions:   Ordered at: 23 1034     Code Status (Patient has no pulse and is not breathing):    CPR (Attempt to Resuscitate)     Medical Interventions (Patient has pulse or is breathing):    Full Support       GUANACO Flores  23  10:57 EST          Electronically signed by Verenice Deluca APRN at 23 1058     Verenice Deluca APRN at 23 0959                     PROGRESS NOTE         Patient Identification:  Name:  Ken Krueger  Age:  47 y.o.  Sex:  male  :  1975  MRN:  [id number]  Visit Number:  [id number]  Primary Care Provider:  Franchesca Hodges APRN         LOS: 11 days       ----------------------------------------------------------------------------------------------------------------------  Subjective       Chief Complaints:    No chief complaint on file.        Interval History:      Patient sitting up in bed this morning eating breakfast.  No issues or complaints.  Currently on room air with no apparent distress.  Lungs clear to auscultation bilaterally.  Afebrile, denies increase in ostomy output.  No new labs this morning.    Review of Systems:    Constitutional: no fever, chills and night sweats.  No fatigue.  Eyes: no eye drainage, itching or redness.  HEENT: no mouth sores, dysphagia or nose bleed.  Respiratory: no for shortness of breath, cough, or production of sputum.  Cardiovascular: no chest pain, no palpitations, no orthopnea.  Gastrointestinal: no nausea, vomiting or diarrhea. No abdominal pain, hematemesis or rectal bleeding.  Genitourinary: no dysuria or polyuria.  Hematologic/lymphatic: no lymph node abnormalities, no easy bruising or easy bleeding.  Musculoskeletal: No muscle or joint pain.  Skin: No rash and no itching.  Decubitus ulcers.  Neurological: no loss of  consciousness, no seizure, no headache.  Behavioral/Psych: no depression or suicidal ideation.  Endocrine: no hot flashes.  Immunologic: negative.    ----------------------------------------------------------------------------------------------------------------------      Objective       Current [facility] Meds:    Pharmacy Consult - Pharmacy to dose,       ----------------------------------------------------------------------------------------------------------------------    Vital Signs:  Temp:  [97.4 °F (36.3 °C)-98.3 °F (36.8 °C)] 97.4 °F (36.3 °C)  Heart Rate:  [82-87] 82  Resp:  [18] 18  BP: (115-136)/(75-79) 115/75  No data found.  SpO2 Percentage    03/02/23 0935 03/02/23 1900 03/03/23 1903   SpO2: 95% 96% 94%     SpO2:  [94 %] 94 %  on   ;   Device (Oxygen Therapy): room air    Body mass index is 42.34 kg/m².  Wt Readings from Last 3 Encounters:   02/21/23 (!) 138 kg (303 lb 9.2 oz)   02/20/23 (!) 139 kg (306 lb 14.1 oz)   12/13/22 (!) 141 kg (310 lb 14.4 oz)        Intake/Output Summary (Last 24 hours) at 3/4/2023 0968  Last data filed at 3/4/2023 0900  Gross per 24 hour   Intake 2360 ml   Output 3850 ml   Net -1490 ml     Diet: Diabetic Diets; Consistent Carbohydrate;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

## 2023-03-06 NOTE — CASE MANAGEMENT/SOCIAL WORK
Discharge Planning Assessment   Fabricio     Patient Name: Ken Krueger  MRN: [id number]  Today's Date: 3/6/2023    Admit Date: 2/21/2023    Plan: Pt was admitted to Swingbed on 2/21/23. Pt's updated clinicals have been submitted for continued stay review. SS to follow and assist with discharge planning.   Discharge Plan     Row Name 03/06/23 1323       Plan    Plan Pt was admitted to Swingbed on 2/21/23. Pt's updated clinicals have been submitted for continued stay review. SS to follow and assist with discharge planning.                HUMPHREY Loya

## 2023-03-06 NOTE — PROGRESS NOTES
PROGRESS NOTE         Patient Identification:  Name:  Ken Krueger  Age:  47 y.o.  Sex:  male  :  1975  MRN:  [id number]  Visit Number:  [id number]  Primary Care Provider:  Franchesca Hodges APRN         LOS: 13 days       ----------------------------------------------------------------------------------------------------------------------  Subjective       Chief Complaints:    No chief complaint on file.        Interval History:      Patient sitting up in bed eating breakfast this morning.  Overall feels well today.  Currently on room air with no apparent distress.  Lungs clear to auscultation bilaterally.  Abdomen soft, nontender.  Afebrile, denies diarrhea.    Review of Systems:    Constitutional: no fever, chills and night sweats.  No fatigue.  Eyes: no eye drainage, itching or redness.  HEENT: no mouth sores, dysphagia or nose bleed.  Respiratory: no for shortness of breath, cough, or production of sputum.  Cardiovascular: no chest pain, no palpitations, no orthopnea.  Gastrointestinal: no nausea, vomiting or diarrhea. No abdominal pain, hematemesis or rectal bleeding.  Genitourinary: no dysuria or polyuria.  Hematologic/lymphatic: no lymph node abnormalities, no easy bruising or easy bleeding.  Musculoskeletal: No muscle or joint pain.  Skin: No rash and no itching.  Decubitus ulcers.  Neurological: no loss of consciousness, no seizure, no headache.  Behavioral/Psych: no depression or suicidal ideation.  Endocrine: no hot flashes.  Immunologic: negative.    ----------------------------------------------------------------------------------------------------------------------      Objective       [facility] Meds:    Pharmacy Consult - Pharmacy to dose,       ----------------------------------------------------------------------------------------------------------------------    Vital Signs:  Temp:  [97.6 °F (36.4 °C)-98.2 °F (36.8 °C)] 97.6 °F (36.4 °C)  Heart Rate:  [81-86] 81  Resp:   [16-18] 16  BP: (102-135)/(55-79) 102/55  No data found.  SpO2 Percentage    03/03/23 1903 03/04/23 1900 03/05/23 1857   SpO2: 94% 94% 98%     SpO2:  [98 %] 98 %  on  Flow (L/min):  [3] 3;   Device (Oxygen Therapy): room air    Body mass index is 42.34 kg/m².  Wt Readings from Last 3 Encounters:   02/21/23 (!) 138 kg (303 lb 9.2 oz)   02/20/23 (!) 139 kg (306 lb 14.1 oz)   12/13/22 (!) 141 kg (310 lb 14.4 oz)        Intake/Output Summary (Last 24 hours) at 3/6/2023 1024  Last data filed at 3/6/2023 0300  Gross per 24 hour   Intake 3060.63 ml   Output 8125 ml   Net -5064.37 ml     Diet: Diabetic Diets; Consistent Carbohydrate; Texture: Regular Texture (IDDSI 7); Fluid Consistency: Thin (IDDSI 0)  ----------------------------------------------------------------------------------------------------------------------      Physical Exam:    Constitutional:  Well-developed, well-nourished.  On room air with no apparent distress.  Eating breakfast this morning.  HENT:  Head: Normocephalic and atraumatic.  Mouth:  Moist mucous membranes.    Eyes:  Conjunctivae and EOM are normal.  No scleral icterus.  Neck:  Neck supple.  No JVD present.    Cardiovascular:  Normal rate, regular rhythm and normal heart sounds with no murmur. No edema.  Pulmonary/Chest:  No respiratory distress, no wheezes, no crackles, with normal breath sounds and good air movement.  Lungs clear to auscultation bilaterally  Abdominal: Obese.  Soft.  Bowel sounds are normal.  No distension and no tenderness.  Ileal conduit in place.  Colostomy bag in place with soft stool.  Musculoskeletal:  No tenderness.  No swelling or redness of joints.  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 PICC to right upper arm with no signs of infection.  Neurological:  Alert and oriented to person, place, and time.  No facial droop.  No slurred speech.   Skin:  Stage IV sacral decubitus wound status post excisional debridement.  Psychiatric:  Normal mood and affect.  Behavior is  normal.      ----------------------------------------------------------------------------------------------------------------------                      Invalid input(s): PROTEstimated Creatinine Clearance: 149.9 mL/min (by C-G formula based on SCr of 0.88 mg/dL).  No results found for: AMMONIA    Glucose   Date/Time Value Ref Range Status   03/06/2023 0606 262 (H) 70 - 130 mg/dL Final     Comment:     Meter: [id number] : [id number] MERCEDES JACOBSEN   03/05/2023 2000 188 (H) 70 - 130 mg/dL Final     Comment:     Meter: [id number] : [id number] JULIANNA BARRETT   03/05/2023 1604 211 (H) 70 - 130 mg/dL Final     Comment:     Meter: [id number] : [id number] BIRGIT MULLER   03/05/2023 1118 245 (H) 70 - 130 mg/dL Final     Comment:     Meter: [id number] : [id number] BIRGIT MULLER   03/05/2023 0701 172 (H) 70 - 130 mg/dL Final     Comment:     Meter: [id number] : [id number] SHE CASTELLANO   03/04/2023 1940 202 (H) 70 - 130 mg/dL Final     Comment:     Meter: [id number] : [id number] WILBER BERTRAND   03/04/2023 1620 239 (H) 70 - 130 mg/dL Final     Comment:     Meter: [id number] : [id number] BIRGIT MULLER   03/04/2023 1053 284 (H) 70 - 130 mg/dL Final     Comment:     Meter: [id number] : [id number] BIRGIT MULLER     Lab Results   Component Value Date    HGBA1C 11.50 (H) 02/13/2023     Lab Results   Component Value Date    TSH 3.780 07/19/2022    FREET4 1.31 06/02/2021       Blood Culture   Date Value Ref Range Status   02/12/2023 No growth at 24 hours  Preliminary   02/12/2023 No growth at 24 hours  Preliminary     No results found for: URINECX  No results found for: WOUNDCX  No results found for: STOOLCX  No results found for: RESPCX  Pain Management Panel       Pain Management Panel Latest Ref Rng & Units 6/2/2021 8/19/2018    AMPHETAMINES SCREEN, URINE Negative Negative Negative    BARBITURATES SCREEN Negative Negative Negative    BENZODIAZEPINE SCREEN, URINE Negative Negative Negative     BUPRENORPHINEUR Negative Negative Negative    COCAINE SCREEN, URINE Negative Negative Negative    METHADONE SCREEN, URINE Negative Negative Negative              ----------------------------------------------------------------------------------------------------------------------  Imaging Results (Last 24 Hours)       ** No results found for the last 24 hours. **            -----------------------------------------------------------------------------------  Pertinent Infectious Disease Results     CT abdomen pelvis with contrast on 2/12/2023 showed there is a new decubitus ulcer just to the right of midline at the level of the coccyx with constellation of imaging findings most characteristic of acute infection/osteomyelitis. Small pocket of air and fluid adjacent to the coccyx measuring 3.7 x 4.1 cm could reflect phlegmon or abscess. Chronic bilateral decubitus ulcers at the level of the ischial tuberosities bilaterally with imaging findings suggestive of chronic infection/osteomyelitis, similar to prior. Hepatic steatosis and hepatomegaly with borderline splenomegaly. Nonobstructing left renal stones. Postoperative changes of left lower quadrant and colostomy and right mid abdominal ileal conduit formation. Diverticulosis. No diverticulitis or bowel obstruction. COVID-19 and flu A/B PCR on 2/12/2023 was negative.    Status post excisional debridement of sacral ulcer on 2/13/2023 with Dr. aTylor.           Assessment/Plan         ASSESSMENT:    Septic shock with lactic acid greater than 4 on admission  Acute and chronic decubitus ulcerations with osteomyelitis and possible abscess      PLAN:    I saw the patient this morning and discussed the case and plan of care with GUANACO Flores and got report from primary RN and here are my findings:    This morning, the patient was sitting up in bed and eating breakfast. Patient reported feeling well overall.    Lung sounds were clear upon auscultation bilaterally, and  the abdomen was soft and nontender. Additionally, the patient was observed to be afebrile and denied experiencing diarrhea.    The patient's current treatment plan involves receiving ceftriaxone 2 g IV every 24 hours for the treatment of osteomyelitis, which is set to continue until 3/29/2023. Despite being on this medication, the patient is currently able to breathe without apparent distress while on room air.      Code Status:   Code Status and Medical Interventions:   Ordered at: 02/21/23 1034     Code Status (Patient has no pulse and is not breathing):    CPR (Attempt to Resuscitate)     Medical Interventions (Patient has pulse or is breathing):    Full Support       GUANACO Flores  03/06/23  10:24 EST    Electronically signed by GUANACO Flores, 03/06/23, 10:25 AM EST.      Electronically signed by Tra Jain MD, 03/06/23, 12:14 PM EST.

## 2023-03-06 NOTE — PLAN OF CARE
Goal Outcome Evaluation:              Outcome Evaluation: Patient resting in bed at this time. VSS. Wound care completed per order. No complaints or acute changes at this time. Will continue plan of care.

## 2023-03-07 LAB
ANION GAP SERPL CALCULATED.3IONS-SCNC: 11.2 MMOL/L (ref 5–15)
BASOPHILS # BLD AUTO: 0.04 10*3/MM3 (ref 0–0.2)
BASOPHILS NFR BLD AUTO: 0.4 % (ref 0–1.5)
BUN SERPL-MCNC: 46 MG/DL (ref 6–20)
BUN/CREAT SERPL: 49.5 (ref 7–25)
CALCIUM SPEC-SCNC: 9.2 MG/DL (ref 8.6–10.5)
CHLORIDE SERPL-SCNC: 103 MMOL/L (ref 98–107)
CO2 SERPL-SCNC: 23.8 MMOL/L (ref 22–29)
CREAT SERPL-MCNC: 0.93 MG/DL (ref 0.76–1.27)
DEPRECATED RDW RBC AUTO: 46.9 FL (ref 37–54)
EGFRCR SERPLBLD CKD-EPI 2021: 103.2 ML/MIN/1.73
EOSINOPHIL # BLD AUTO: 0.23 10*3/MM3 (ref 0–0.4)
EOSINOPHIL NFR BLD AUTO: 2.4 % (ref 0.3–6.2)
ERYTHROCYTE [DISTWIDTH] IN BLOOD BY AUTOMATED COUNT: 14.5 % (ref 12.3–15.4)
GLUCOSE BLDC GLUCOMTR-MCNC: 228 MG/DL (ref 70–130)
GLUCOSE BLDC GLUCOMTR-MCNC: 235 MG/DL (ref 70–130)
GLUCOSE BLDC GLUCOMTR-MCNC: 278 MG/DL (ref 70–130)
GLUCOSE BLDC GLUCOMTR-MCNC: 298 MG/DL (ref 70–130)
GLUCOSE SERPL-MCNC: 312 MG/DL (ref 65–99)
HCT VFR BLD AUTO: 37.9 % (ref 37.5–51)
HGB BLD-MCNC: 11.1 G/DL (ref 13–17.7)
IMM GRANULOCYTES # BLD AUTO: 0.04 10*3/MM3 (ref 0–0.05)
IMM GRANULOCYTES NFR BLD AUTO: 0.4 % (ref 0–0.5)
LYMPHOCYTES # BLD AUTO: 1.55 10*3/MM3 (ref 0.7–3.1)
LYMPHOCYTES NFR BLD AUTO: 16 % (ref 19.6–45.3)
MCH RBC QN AUTO: 26.3 PG (ref 26.6–33)
MCHC RBC AUTO-ENTMCNC: 29.3 G/DL (ref 31.5–35.7)
MCV RBC AUTO: 89.8 FL (ref 79–97)
MONOCYTES # BLD AUTO: 0.36 10*3/MM3 (ref 0.1–0.9)
MONOCYTES NFR BLD AUTO: 3.7 % (ref 5–12)
NEUTROPHILS NFR BLD AUTO: 7.46 10*3/MM3 (ref 1.7–7)
NEUTROPHILS NFR BLD AUTO: 77.1 % (ref 42.7–76)
NRBC BLD AUTO-RTO: 0 /100 WBC (ref 0–0.2)
PLATELET # BLD AUTO: 284 10*3/MM3 (ref 140–450)
PMV BLD AUTO: 9.6 FL (ref 6–12)
POTASSIUM SERPL-SCNC: 4.1 MMOL/L (ref 3.5–5.2)
RBC # BLD AUTO: 4.22 10*6/MM3 (ref 4.14–5.8)
SODIUM SERPL-SCNC: 138 MMOL/L (ref 136–145)
WBC NRBC COR # BLD: 9.68 10*3/MM3 (ref 3.4–10.8)

## 2023-03-07 PROCEDURE — 80048 BASIC METABOLIC PNL TOTAL CA: CPT | Performed by: INTERNAL MEDICINE

## 2023-03-07 PROCEDURE — 85025 COMPLETE CBC W/AUTO DIFF WBC: CPT | Performed by: INTERNAL MEDICINE

## 2023-03-07 PROCEDURE — 63710000001 INSULIN ASPART PER 5 UNITS: Performed by: INTERNAL MEDICINE

## 2023-03-07 PROCEDURE — 25010000002 CEFTRIAXONE PER 250 MG: Performed by: STUDENT IN AN ORGANIZED HEALTH CARE EDUCATION/TRAINING PROGRAM

## 2023-03-07 PROCEDURE — 63710000001 INSULIN DETEMIR PER 5 UNITS: Performed by: INTERNAL MEDICINE

## 2023-03-07 PROCEDURE — 99308 SBSQ NF CARE LOW MDM 20: CPT | Performed by: NURSE PRACTITIONER

## 2023-03-07 PROCEDURE — 82962 GLUCOSE BLOOD TEST: CPT

## 2023-03-07 PROCEDURE — 63710000001 INSULIN ASPART PER 5 UNITS: Performed by: STUDENT IN AN ORGANIZED HEALTH CARE EDUCATION/TRAINING PROGRAM

## 2023-03-07 RX ORDER — INSULIN ASPART 100 [IU]/ML
35 INJECTION, SOLUTION INTRAVENOUS; SUBCUTANEOUS
Status: DISCONTINUED | OUTPATIENT
Start: 2023-03-07 | End: 2023-03-09

## 2023-03-07 RX ORDER — GLIPIZIDE 5 MG/1
7.5 TABLET ORAL
Status: DISCONTINUED | OUTPATIENT
Start: 2023-03-07 | End: 2023-03-08

## 2023-03-07 RX ADMIN — BUMETANIDE 2 MG: 1 TABLET ORAL at 08:54

## 2023-03-07 RX ADMIN — VITAMINS A AND D OINTMENT 1 APPLICATION: 15.5; 53.4 OINTMENT TOPICAL at 21:34

## 2023-03-07 RX ADMIN — ARIPIPRAZOLE 10 MG: 10 TABLET ORAL at 21:33

## 2023-03-07 RX ADMIN — CARVEDILOL 12.5 MG: 6.25 TABLET, FILM COATED ORAL at 17:21

## 2023-03-07 RX ADMIN — SUCRALFATE 1 G: 1 TABLET ORAL at 10:37

## 2023-03-07 RX ADMIN — TUBERCULIN PURIFIED PROTEIN DERIVATIVE 5 UNITS: 5 INJECTION, SOLUTION INTRADERMAL at 05:36

## 2023-03-07 RX ADMIN — EMPAGLIFLOZIN 10 MG: 10 TABLET, FILM COATED ORAL at 09:01

## 2023-03-07 RX ADMIN — INSULIN ASPART 5 UNITS: 100 INJECTION, SOLUTION INTRAVENOUS; SUBCUTANEOUS at 09:13

## 2023-03-07 RX ADMIN — DICYCLOMINE HYDROCHLORIDE 10 MG: 10 CAPSULE ORAL at 08:55

## 2023-03-07 RX ADMIN — Medication 1000 UNITS: at 08:56

## 2023-03-07 RX ADMIN — SPIRONOLACTONE 25 MG: 25 TABLET ORAL at 08:55

## 2023-03-07 RX ADMIN — DICYCLOMINE HYDROCHLORIDE 10 MG: 10 CAPSULE ORAL at 21:33

## 2023-03-07 RX ADMIN — INSULIN ASPART 35 UNITS: 100 INJECTION, SOLUTION INTRAVENOUS; SUBCUTANEOUS at 17:29

## 2023-03-07 RX ADMIN — CARVEDILOL 12.5 MG: 6.25 TABLET, FILM COATED ORAL at 08:56

## 2023-03-07 RX ADMIN — GABAPENTIN 800 MG: 400 CAPSULE ORAL at 17:21

## 2023-03-07 RX ADMIN — PANTOPRAZOLE SODIUM 40 MG: 40 TABLET, DELAYED RELEASE ORAL at 08:55

## 2023-03-07 RX ADMIN — GABAPENTIN 800 MG: 400 CAPSULE ORAL at 21:32

## 2023-03-07 RX ADMIN — INSULIN DETEMIR 65 UNITS: 100 INJECTION, SOLUTION SUBCUTANEOUS at 09:14

## 2023-03-07 RX ADMIN — INSULIN ASPART 8 UNITS: 100 INJECTION, SOLUTION INTRAVENOUS; SUBCUTANEOUS at 12:54

## 2023-03-07 RX ADMIN — MODAFINIL 200 MG: 100 TABLET ORAL at 08:57

## 2023-03-07 RX ADMIN — GLIPIZIDE 7.5 MG: 5 TABLET ORAL at 17:22

## 2023-03-07 RX ADMIN — OXYCODONE HYDROCHLORIDE AND ACETAMINOPHEN 500 MG: 500 TABLET ORAL at 08:55

## 2023-03-07 RX ADMIN — BACLOFEN 30 MG: 10 TABLET ORAL at 21:33

## 2023-03-07 RX ADMIN — METFORMIN HYDROCHLORIDE 1000 MG: 500 TABLET ORAL at 17:21

## 2023-03-07 RX ADMIN — PANTOPRAZOLE SODIUM 40 MG: 40 TABLET, DELAYED RELEASE ORAL at 21:33

## 2023-03-07 RX ADMIN — Medication 1 TABLET: at 08:57

## 2023-03-07 RX ADMIN — VERICIGUAT 2.5 MG: 2.5 TABLET, FILM COATED ORAL at 09:03

## 2023-03-07 RX ADMIN — SACUBITRIL AND VALSARTAN 1 TABLET: 24; 26 TABLET, FILM COATED ORAL at 21:33

## 2023-03-07 RX ADMIN — GABAPENTIN 800 MG: 400 CAPSULE ORAL at 08:54

## 2023-03-07 RX ADMIN — NYSTATIN: 100000 POWDER TOPICAL at 21:34

## 2023-03-07 RX ADMIN — Medication 10 ML: at 09:11

## 2023-03-07 RX ADMIN — BACLOFEN 30 MG: 10 TABLET ORAL at 08:56

## 2023-03-07 RX ADMIN — BACLOFEN 30 MG: 10 TABLET ORAL at 17:22

## 2023-03-07 RX ADMIN — SACUBITRIL AND VALSARTAN 1 TABLET: 24; 26 TABLET, FILM COATED ORAL at 08:56

## 2023-03-07 RX ADMIN — ATORVASTATIN CALCIUM 10 MG: 10 TABLET, FILM COATED ORAL at 21:33

## 2023-03-07 RX ADMIN — DULOXETINE HYDROCHLORIDE 60 MG: 60 CAPSULE, DELAYED RELEASE ORAL at 09:01

## 2023-03-07 RX ADMIN — FERROUS SULFATE TAB 325 MG (65 MG ELEMENTAL FE) 325 MG: 325 (65 FE) TAB at 08:55

## 2023-03-07 RX ADMIN — INSULIN ASPART 5 UNITS: 100 INJECTION, SOLUTION INTRAVENOUS; SUBCUTANEOUS at 17:22

## 2023-03-07 RX ADMIN — METFORMIN HYDROCHLORIDE 1000 MG: 500 TABLET ORAL at 08:55

## 2023-03-07 RX ADMIN — CEFTRIAXONE 2 G: 2 INJECTION, POWDER, FOR SOLUTION INTRAMUSCULAR; INTRAVENOUS at 09:12

## 2023-03-07 RX ADMIN — NYSTATIN: 100000 POWDER TOPICAL at 09:15

## 2023-03-07 RX ADMIN — MAGNESIUM GLUCONATE 500 MG ORAL TABLET 1200 MG: 500 TABLET ORAL at 08:55

## 2023-03-07 RX ADMIN — INSULIN DETEMIR 65 UNITS: 100 INJECTION, SOLUTION SUBCUTANEOUS at 21:33

## 2023-03-07 RX ADMIN — Medication 1 CAPSULE: at 09:12

## 2023-03-07 RX ADMIN — GLIPIZIDE 5 MG: 5 TABLET ORAL at 09:01

## 2023-03-07 RX ADMIN — DULOXETINE HYDROCHLORIDE 60 MG: 60 CAPSULE, DELAYED RELEASE ORAL at 21:33

## 2023-03-07 RX ADMIN — INSULIN ASPART 35 UNITS: 100 INJECTION, SOLUTION INTRAVENOUS; SUBCUTANEOUS at 12:54

## 2023-03-07 RX ADMIN — VITAMINS A AND D OINTMENT 1 APPLICATION: 15.5; 53.4 OINTMENT TOPICAL at 09:14

## 2023-03-07 RX ADMIN — APIXABAN 5 MG: 5 TABLET, FILM COATED ORAL at 21:34

## 2023-03-07 RX ADMIN — INSULIN ASPART 30 UNITS: 100 INJECTION, SOLUTION INTRAVENOUS; SUBCUTANEOUS at 09:13

## 2023-03-07 RX ADMIN — BACLOFEN 30 MG: 10 TABLET ORAL at 11:35

## 2023-03-07 RX ADMIN — Medication 10 ML: at 21:34

## 2023-03-07 RX ADMIN — APIXABAN 5 MG: 5 TABLET, FILM COATED ORAL at 08:55

## 2023-03-07 NOTE — PLAN OF CARE
Goal Outcome Evaluation:              Outcome Evaluation: Patient resting in bed at this time. VSS. Wound care completed per order. No complaints or acute changes. Will continue plan of care.

## 2023-03-07 NOTE — PROGRESS NOTES
[facility] HOSPITALIST PROGRESS NOTE     Patient Identification:  Name:  Ken Krueger  Age:  47 y.o.  Sex:  male  :  1975  MRN:  [id number]  Visit Number:  [id number]  ROOM: [address] [place] [address]     Primary Care Provider:  Franchseca Hodges APRN    Length of stay in inpatient status:  13    Subjective     Chief Compliant:  No chief complaint on file.      History of Presenting Illness:    Patient reports feeling well today. He was wearing BiPAP which he reports wearing any time he sleeps. Wound vac in place. No family bedside.     ROS:  Otherwise 10 point ROS negative other than documented above in HPI.     Objective     Current Hospital Meds:Acidophilus/Pectin, 1 capsule, Oral, Daily  apixaban, 5 mg, Oral, Q12H  ARIPiprazole, 10 mg, Oral, Nightly  ascorbic acid, 500 mg, Oral, Daily  atorvastatin, 10 mg, Oral, Nightly  baclofen, 30 mg, Oral, 4x Daily With Meals & Nightly  bumetanide, 2 mg, Oral, Daily  carvedilol, 12.5 mg, Oral, BID With Meals  cefTRIAXone, 2 g, Intravenous, Q24H  cholecalciferol, 1,000 Units, Oral, Daily  dicyclomine, 10 mg, Oral, BID  DULoxetine, 60 mg, Oral, BID  empagliflozin, 10 mg, Oral, Daily  ferrous sulfate, 325 mg, Oral, Daily With Breakfast  gabapentin, 800 mg, Oral, TID  glipizide, 5 mg, Oral, BID AC  Insulin Aspart, 0-9 Units, Subcutaneous, TID AC  Insulin Aspart, 30 Units, Subcutaneous, TID With Meals  insulin detemir, 65 Units, Subcutaneous, Q12H  magic barrier cream, 1 application, Topical, BID  magnesium oxide, 1,200 mg, Oral, Daily  metFORMIN, 1,000 mg, Oral, BID With Meals  modafinil, 200 mg, Oral, Daily  multivitamin with minerals, 1 tablet, Oral, Daily  nystatin, , Topical, Q12H  pantoprazole, 40 mg, Oral, BID  sacubitril-valsartan, 1 tablet, Oral, Q12H  sodium chloride, 10 mL, Intravenous, Q12H  spironolactone, 25 mg, Oral, Daily  sucralfate, 1 g, Oral, Daily  [START ON 3/7/2023] tuberculin, 5 Units, Intradermal, Once  Vericiguat, 2.5 mg, Oral, Daily    Pharmacy Consult  - Pharmacy to dose,         Current Antimicrobial Therapy:  Anti-Infectives (From admission, onward)    Ordered     Dose/Rate Route Frequency Start Stop    02/21/23 1034  cefTRIAXone (ROCEPHIN) 2 g in sodium chloride 0.9 % 100 mL IVPB-VTB        Ordering Provider: Robinson Yanes DO    2 g  200 mL/hr over 30 Minutes Intravenous Every 24 Hours 02/22/23 0800 03/29/23 0759        Current Diuretic Therapy:  Diuretics (From admission, onward)    Ordered     Dose/Rate Route Frequency Start Stop    02/21/23 1034  bumetanide (BUMEX) tablet 2 mg        Ordering Provider: Robinson Yanes DO    2 mg Oral Daily 02/22/23 0900      02/21/23 1034  spironolactone (ALDACTONE) tablet 25 mg        Ordering Provider: Robinson Yanes DO    25 mg Oral Daily 02/22/23 0900          ----------------------------------------------------------------------------------------------------------------------  Vital Signs:  Temp:  [97.6 °F (36.4 °C)-97.9 °F (36.6 °C)] 97.9 °F (36.6 °C)  Heart Rate:  [81-89] 89  Resp:  [16-18] 18  BP: (102-130)/(53-65) 113/53  SpO2:  [98 %] 98 %  on  Flow (L/min):  [3] 3;   Device (Oxygen Therapy): room air  Body mass index is 42.34 kg/m².    Wt Readings from Last 3 Encounters:   02/21/23 (!) 138 kg (303 lb 9.2 oz)   02/20/23 (!) 139 kg (306 lb 14.1 oz)   12/13/22 (!) 141 kg (310 lb 14.4 oz)     Intake & Output (last 3 days)       03/04 0701 03/05 0700 03/05 0701  03/06 0700 03/06 0701  03/07 0700    P.O. 650 4040 1200    I.V. (mL/kg) 403.5 (2.9) 100.6 (0.7) 98.2 (0.7)    IV Piggyback       Total Intake(mL/kg) 1053.5 (7.6) 4140.6 (30) 1298.2 (9.4)    Urine (mL/kg/hr) 5575 (1.7) 7450 (2.2) 4900 (2.9)    Stool 550 675     Total Output 6115 6152 6652    Net -5071.5 -3984.4 -3601.8               Diet: Diabetic Diets; Consistent Carbohydrate; Texture: Regular Texture (IDDSI 7); Fluid Consistency: Thin (IDDSI  0)  ----------------------------------------------------------------------------------------------------------------------  Physical exam:  Constitutional:  Well-developed and well-nourished.  No respiratory distress. Obese.     HENT:  Head:  Normocephalic and atraumatic.  Mouth:  Moist mucous membranes.    Eyes:  Conjunctivae and EOM are normal. No scleral icterus.    Neck:  Neck supple.  No JVD present.    Cardiovascular:  Normal rate, regular rhythm and normal heart sounds with no murmur.  Pulmonary/Chest:  No respiratory distress, no wheezes, no crackles, with normal breath sounds and good air movement.  Abdominal:  Soft.  Bowel sounds are normal.  No distension and no tenderness.   Neurological:  Alert and oriented to person, place, and time.  No cranial nerve deficit.  No tongue deviation.  No facial droop.  No slurred speech.   Skin:  Skin is warm and dry. No rash noted. No pallor.   ----------------------------------------------------------------------------------------------------------------------  Tele:    ----------------------------------------------------------------------------------------------------------------------                Invalid input(s): PROTEstimated Creatinine Clearance: 149.9 mL/min (by C-G formula based on SCr of 0.88 mg/dL).  No results found for: AMMONIA              Glucose   Date/Time Value Ref Range Status   03/06/2023 1650 190 (H) 70 - 130 mg/dL Final     Comment:     Meter: [id number] : [id number] BIRGIT MULLER   03/06/2023 1048 319 (H) 70 - 130 mg/dL Final     Comment:     Meter: [id number] : [id number] BIRGIT MULLER   03/06/2023 0606 262 (H) 70 - 130 mg/dL Final     Comment:     Meter: [id number] : [id number] MERCEDES JACOBSEN   03/05/2023 2000 188 (H) 70 - 130 mg/dL Final     Comment:     Meter: [id number] : [id number] JULIANNA BARRETT   03/05/2023 1604 211 (H) 70 - 130 mg/dL Final     Comment:     Meter: [id number] : [id number] BIRGIT MULLER   03/05/2023  1118 245 (H) 70 - 130 mg/dL Final     Comment:     Meter: [id number] : [id number] BIRGIT MULLER   03/05/2023 0701 172 (H) 70 - 130 mg/dL Final     Comment:     Meter: [id number] : [id number] SHE CASTELLANO   03/04/2023 1940 202 (H) 70 - 130 mg/dL Final     Comment:     Meter: [id number] : [id number] WILBER BERTRAND     Lab Results   Component Value Date    TSH 3.780 07/19/2022    FREET4 1.31 06/02/2021     No results found for: PREGTESTUR, PREGSERUM, HCG, HCGQUANT  Pain Management Panel     Pain Management Panel Latest Ref Rng & Units 6/2/2021 8/19/2018    AMPHETAMINES SCREEN, URINE Negative Negative Negative    BARBITURATES SCREEN Negative Negative Negative    BENZODIAZEPINE SCREEN, URINE Negative Negative Negative    BUPRENORPHINEUR Negative Negative Negative    COCAINE SCREEN, URINE Negative Negative Negative    METHADONE SCREEN, URINE Negative Negative Negative        Brief Urine Lab Results  (Last result in the past 365 days)      Color   Clarity   Blood   Leuk Est   Nitrite   Protein   CREAT   Urine HCG        12/09/22 1928 Yellow   Clear   Trace   Small (1+)   Negative   Negative               No results found for: BLOODCX  No results found for: URINECX  No results found for: WOUNDCX  No results found for: STOOLCX  No results found for: RESPCX  No results found for: AFBCX        I have personally looked at the labs and they are summarized above.  ----------------------------------------------------------------------------------------------------------------------  Detailed radiology reports for the last 24 hours:    Imaging Results (Last 24 Hours)     ** No results found for the last 24 hours. **        Assessment & Plan    -Stage IV sacral decubiti with osteomyelitis present on admission E. coli on culture  -Morbidly obese complicating all aspects of care  -Diabetes type 2 with hyperglycemia  -Paraplegia  -History of PE on chronic anticoagulation  -ARLIN on CPAP  -History of nonischemic  cardiomyopathy  -History of ileal conduit and colostomy    Blood glucoses improved but still up to 319 this AM. Increased basal insulin further. Increase SSI. Continue ceftriaxone. Continue wound vac and wound care.     Dispo: Patient admitted to swing bed and is getting IV abx through 3/29.     VTE Prophylaxis:   Mechanical Order History:     None      Pharmalogical Order History:      Ordered     Dose Route Frequency Stop    02/21/23 1034  apixaban (ELIQUIS) tablet 5 mg         5 mg PO Every 12 Hours Scheduled --                Brannon Adrian MD  [facility]  03/06/23  19:22 EST
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
## 2023-03-07 NOTE — PLAN OF CARE
Goal Outcome Evaluation:  Plan of Care Reviewed With: patient        Progress: no change  Outcome Evaluation: Patient rested in bed during shift. Wound care complete per order. VSS. will continue with plan of care.

## 2023-03-07 NOTE — PROGRESS NOTES
PROGRESS NOTE         Patient Identification:  Name:  Ken Krueger  Age:  47 y.o.  Sex:  male  :  1975  MRN:  [id number]  Visit Number:  [id number]  Primary Care Provider:  Franchesca Hodges APRN         LOS: 14 days       ----------------------------------------------------------------------------------------------------------------------  Subjective       Chief Complaints:    No chief complaint on file.        Interval History:      Patient sitting up in bed eating breakfast this morning.  No issues or complaints.  Currently on room air with no apparent distress.  Primary RN at bedside.  Lungs clear to auscultation bilaterally.  Abdomen soft, nontender.  Afebrile, denies diarrhea.  WBC normal at 9.68.    Review of Systems:    Constitutional: no fever, chills and night sweats.  No fatigue.  Eyes: no eye drainage, itching or redness.  HEENT: no mouth sores, dysphagia or nose bleed.  Respiratory: no for shortness of breath, cough, or production of sputum.  Cardiovascular: no chest pain, no palpitations, no orthopnea.  Gastrointestinal: no nausea, vomiting or diarrhea. No abdominal pain, hematemesis or rectal bleeding.  Genitourinary: no dysuria or polyuria.  Hematologic/lymphatic: no lymph node abnormalities, no easy bruising or easy bleeding.  Musculoskeletal: No muscle or joint pain.  Skin: No rash and no itching.  Decubitus ulcers.  Neurological: no loss of consciousness, no seizure, no headache.  Behavioral/Psych: no depression or suicidal ideation.  Endocrine: no hot flashes.  Immunologic: negative.    ----------------------------------------------------------------------------------------------------------------------      Objective       [facility] [place] Meds:    Pharmacy Consult - Pharmacy to dose,       ----------------------------------------------------------------------------------------------------------------------    Vital Signs:  Temp:  [97.8 °F (36.6 °C)-98.4 °F (36.9 °C)] 98.4 °F  (36.9 °C)  Heart Rate:  [68-89] 71  Resp:  [16-20] 16  BP: (112-130)/(53-65) 112/62  No data found.  SpO2 Percentage    03/05/23 1857 03/06/23 1848 03/06/23 2300   SpO2: 98% 98% 98%     SpO2:  [98 %] 98 %  on  Flow (L/min):  [3] 3;   Device (Oxygen Therapy): room air    Body mass index is 42.34 kg/m².  Wt Readings from Last 3 Encounters:   02/21/23 (!) 138 kg (303 lb 9.2 oz)   02/20/23 (!) 139 kg (306 lb 14.1 oz)   12/13/22 (!) 141 kg (310 lb 14.4 oz)        Intake/Output Summary (Last 24 hours) at 3/7/2023 0932  Last data filed at 3/7/2023 0100  Gross per 24 hour   Intake 938.24 ml   Output 5750 ml   Net -4811.76 ml     Diet: Diabetic Diets; Consistent Carbohydrate; Texture: Regular Texture (IDDSI 7); Fluid Consistency: Thin (IDDSI 0)  ----------------------------------------------------------------------------------------------------------------------      Physical Exam:    Constitutional:  Well-developed, well-nourished.  On room air with no apparent distress.  Eating breakfast this morning.  RN at bedside.  HENT:  Head: Normocephalic and atraumatic.  Mouth:  Moist mucous membranes.    Eyes:  Conjunctivae and EOM are normal.  No scleral icterus.  Neck:  Neck supple.  No JVD present.    Cardiovascular:  Normal rate, regular rhythm and normal heart sounds with no murmur. No edema.  Pulmonary/Chest:  No respiratory distress, no wheezes, no crackles, with normal breath sounds and good air movement.  Lungs clear to auscultation bilaterally  Abdominal: Obese.  Soft.  Bowel sounds are normal.  No distension and no tenderness.  Ileal conduit in place.  Colostomy bag in place with soft stool.  Musculoskeletal:  No tenderness.  No swelling or redness of joints.  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 PICC to right upper arm with no signs of infection.  Neurological:  Alert and oriented to person, place, and time.  No facial droop.  No slurred speech.   Skin:  Stage IV sacral decubitus wound status post excisional  debridement.  Psychiatric:  Normal mood and affect.  Behavior is normal.      ----------------------------------------------------------------------------------------------------------------------            Results from last 7 days   Lab Units 03/07/23  0322   WBC 10*3/mm3 9.68   HEMOGLOBIN g/dL 11.1*   HEMATOCRIT % 37.9   MCV fL 89.8   MCHC g/dL 29.3*   PLATELETS 10*3/mm3 284     Results from last 7 days   Lab Units 03/07/23  0322   SODIUM mmol/L 138   POTASSIUM mmol/L 4.1   CHLORIDE mmol/L 103   CO2 mmol/L 23.8   BUN mg/dL 46*   CREATININE mg/dL 0.93   CALCIUM mg/dL 9.2   GLUCOSE mg/dL 312*   Estimated Creatinine Clearance: 141.9 mL/min (by C-G formula based on SCr of 0.93 mg/dL).  No results found for: AMMONIA    Glucose   Date/Time Value Ref Range Status   03/07/2023 0629 235 (H) 70 - 130 mg/dL Final     Comment:     Meter: [id number] : [id number] MERCEDES JACOBSEN   03/06/2023 1938 212 (H) 70 - 130 mg/dL Final     Comment:     Meter: [id number] : [id number] JULIANNA BARRETT   03/06/2023 1650 190 (H) 70 - 130 mg/dL Final     Comment:     Meter: [id number] : [id number] BIRGIT MULLER   03/06/2023 1048 319 (H) 70 - 130 mg/dL Final     Comment:     Meter: [id number] : [id number] BIRGIT MULLER   03/06/2023 0606 262 (H) 70 - 130 mg/dL Final     Comment:     Meter: [id number] : [id number] MERCEDES JACOBSEN   03/05/2023 2000 188 (H) 70 - 130 mg/dL Final     Comment:     Meter: [id number] : [id number] JULIANNA LEONARDTON   03/05/2023 1604 211 (H) 70 - 130 mg/dL Final     Comment:     Meter: [id number] : [id number] BIRGIT MULLER   03/05/2023 1118 245 (H) 70 - 130 mg/dL Final     Comment:     Meter: [id number] : [id number] BIRGIT MULLER     Lab Results   Component Value Date    HGBA1C 11.50 (H) 02/13/2023     Lab Results   Component Value Date    TSH 3.780 07/19/2022    FREET4 1.31 06/02/2021       Blood Culture   Date Value Ref Range Status   02/12/2023 No growth at 24 hours  Preliminary   02/12/2023 No  growth at 24 hours  Preliminary     No results found for: URINECX  No results found for: WOUNDCX  No results found for: STOOLCX  No results found for: RESPCX  Pain Management Panel     Pain Management Panel Latest Ref Rng & Units 6/2/2021 8/19/2018    AMPHETAMINES SCREEN, URINE Negative Negative Negative    BARBITURATES SCREEN Negative Negative Negative    BENZODIAZEPINE SCREEN, URINE Negative Negative Negative    BUPRENORPHINEUR Negative Negative Negative    COCAINE SCREEN, URINE Negative Negative Negative    METHADONE SCREEN, URINE Negative Negative Negative            ----------------------------------------------------------------------------------------------------------------------  Imaging Results (Last 24 Hours)     ** No results found for the last 24 hours. **          -----------------------------------------------------------------------------------  Pertinent Infectious Disease Results     CT abdomen pelvis with contrast on 2/12/2023 showed there is a new decubitus ulcer just to the right of midline at the level of the coccyx with constellation of imaging findings most characteristic of acute infection/osteomyelitis. Small pocket of air and fluid adjacent to the coccyx measuring 3.7 x 4.1 cm could reflect phlegmon or abscess. Chronic bilateral decubitus ulcers at the level of the ischial tuberosities bilaterally with imaging findings suggestive of chronic infection/osteomyelitis, similar to prior. Hepatic steatosis and hepatomegaly with borderline splenomegaly. Nonobstructing left renal stones. Postoperative changes of left lower quadrant and colostomy and right mid abdominal ileal conduit formation. Diverticulosis. No diverticulitis or bowel obstruction. COVID-19 and flu A/B PCR on 2/12/2023 was negative.    Status post excisional debridement of sacral ulcer on 2/13/2023 with Dr. Taylor.           Assessment/Plan         ASSESSMENT:    Septic shock with lactic acid greater than 4 on admission  Acute  and chronic decubitus ulcerations with osteomyelitis and possible abscess      PLAN:    Patient sitting up in bed eating breakfast this morning.  No issues or complaints.  Currently on room air with no apparent distress.  Primary RN at bedside.  Lungs clear to auscultation bilaterally.  Abdomen soft, nontender.  Afebrile, denies diarrhea.  WBC normal at 9.68.    The patient's current treatment plan involves receiving ceftriaxone 2 g IV every 24 hours for the treatment of osteomyelitis, which is set to continue until 3/29/2023.  We will continue to follow closely.      Code Status:   Code Status and Medical Interventions:   Ordered at: 02/21/23 1034     Code Status (Patient has no pulse and is not breathing):    CPR (Attempt to Resuscitate)     Medical Interventions (Patient has pulse or is breathing):    Full Support       GUANACO Flores  03/07/23  09:32 EST

## 2023-03-08 LAB
GLUCOSE BLDC GLUCOMTR-MCNC: 247 MG/DL (ref 70–130)
GLUCOSE BLDC GLUCOMTR-MCNC: 262 MG/DL (ref 70–130)
GLUCOSE BLDC GLUCOMTR-MCNC: 294 MG/DL (ref 70–130)
GLUCOSE BLDC GLUCOMTR-MCNC: 316 MG/DL (ref 70–130)

## 2023-03-08 PROCEDURE — 25010000002 CEFTRIAXONE PER 250 MG: Performed by: STUDENT IN AN ORGANIZED HEALTH CARE EDUCATION/TRAINING PROGRAM

## 2023-03-08 PROCEDURE — 63710000001 INSULIN ASPART PER 5 UNITS: Performed by: INTERNAL MEDICINE

## 2023-03-08 PROCEDURE — 82962 GLUCOSE BLOOD TEST: CPT

## 2023-03-08 PROCEDURE — 63710000001 INSULIN DETEMIR PER 5 UNITS: Performed by: INTERNAL MEDICINE

## 2023-03-08 RX ORDER — DEXTROSE MONOHYDRATE 25 G/50ML
25 INJECTION, SOLUTION INTRAVENOUS
Status: DISCONTINUED | OUTPATIENT
Start: 2023-03-08 | End: 2023-03-14

## 2023-03-08 RX ORDER — NICOTINE POLACRILEX 4 MG
15 LOZENGE BUCCAL
Status: DISCONTINUED | OUTPATIENT
Start: 2023-03-08 | End: 2023-03-14

## 2023-03-08 RX ORDER — INSULIN ASPART 100 [IU]/ML
0-24 INJECTION, SOLUTION INTRAVENOUS; SUBCUTANEOUS
Status: DISCONTINUED | OUTPATIENT
Start: 2023-03-08 | End: 2023-03-14

## 2023-03-08 RX ORDER — GLIPIZIDE 5 MG/1
10 TABLET ORAL
Status: DISCONTINUED | OUTPATIENT
Start: 2023-03-09 | End: 2023-03-14

## 2023-03-08 RX ADMIN — PANTOPRAZOLE SODIUM 40 MG: 40 TABLET, DELAYED RELEASE ORAL at 08:23

## 2023-03-08 RX ADMIN — CARVEDILOL 12.5 MG: 6.25 TABLET, FILM COATED ORAL at 17:04

## 2023-03-08 RX ADMIN — GABAPENTIN 800 MG: 400 CAPSULE ORAL at 17:05

## 2023-03-08 RX ADMIN — INSULIN ASPART 35 UNITS: 100 INJECTION, SOLUTION INTRAVENOUS; SUBCUTANEOUS at 11:11

## 2023-03-08 RX ADMIN — INSULIN ASPART 8 UNITS: 100 INJECTION, SOLUTION INTRAVENOUS; SUBCUTANEOUS at 17:05

## 2023-03-08 RX ADMIN — VITAMINS A AND D OINTMENT 1 APPLICATION: 15.5; 53.4 OINTMENT TOPICAL at 20:41

## 2023-03-08 RX ADMIN — GABAPENTIN 800 MG: 400 CAPSULE ORAL at 20:40

## 2023-03-08 RX ADMIN — GABAPENTIN 800 MG: 400 CAPSULE ORAL at 08:23

## 2023-03-08 RX ADMIN — BACLOFEN 30 MG: 10 TABLET ORAL at 20:40

## 2023-03-08 RX ADMIN — CARVEDILOL 12.5 MG: 6.25 TABLET, FILM COATED ORAL at 08:25

## 2023-03-08 RX ADMIN — Medication 1 TABLET: at 08:23

## 2023-03-08 RX ADMIN — INSULIN ASPART 35 UNITS: 100 INJECTION, SOLUTION INTRAVENOUS; SUBCUTANEOUS at 08:36

## 2023-03-08 RX ADMIN — DULOXETINE HYDROCHLORIDE 60 MG: 60 CAPSULE, DELAYED RELEASE ORAL at 20:40

## 2023-03-08 RX ADMIN — SUCRALFATE 1 G: 1 TABLET ORAL at 08:22

## 2023-03-08 RX ADMIN — PANTOPRAZOLE SODIUM 40 MG: 40 TABLET, DELAYED RELEASE ORAL at 20:40

## 2023-03-08 RX ADMIN — BACLOFEN 30 MG: 10 TABLET ORAL at 17:05

## 2023-03-08 RX ADMIN — APIXABAN 5 MG: 5 TABLET, FILM COATED ORAL at 08:24

## 2023-03-08 RX ADMIN — SACUBITRIL AND VALSARTAN 1 TABLET: 24; 26 TABLET, FILM COATED ORAL at 08:22

## 2023-03-08 RX ADMIN — INSULIN ASPART 10 UNITS: 100 INJECTION, SOLUTION INTRAVENOUS; SUBCUTANEOUS at 11:11

## 2023-03-08 RX ADMIN — NYSTATIN: 100000 POWDER TOPICAL at 20:41

## 2023-03-08 RX ADMIN — MAGNESIUM GLUCONATE 500 MG ORAL TABLET 1200 MG: 500 TABLET ORAL at 08:23

## 2023-03-08 RX ADMIN — MODAFINIL 200 MG: 100 TABLET ORAL at 08:22

## 2023-03-08 RX ADMIN — ATORVASTATIN CALCIUM 10 MG: 10 TABLET, FILM COATED ORAL at 20:40

## 2023-03-08 RX ADMIN — BACLOFEN 30 MG: 10 TABLET ORAL at 08:25

## 2023-03-08 RX ADMIN — INSULIN ASPART 4 UNITS: 100 INJECTION, SOLUTION INTRAVENOUS; SUBCUTANEOUS at 18:41

## 2023-03-08 RX ADMIN — BUMETANIDE 2 MG: 1 TABLET ORAL at 08:24

## 2023-03-08 RX ADMIN — Medication 10 ML: at 20:39

## 2023-03-08 RX ADMIN — Medication 1000 UNITS: at 08:24

## 2023-03-08 RX ADMIN — INSULIN DETEMIR 65 UNITS: 100 INJECTION, SOLUTION SUBCUTANEOUS at 20:39

## 2023-03-08 RX ADMIN — DICYCLOMINE HYDROCHLORIDE 10 MG: 10 CAPSULE ORAL at 20:40

## 2023-03-08 RX ADMIN — CEFTRIAXONE 2 G: 2 INJECTION, POWDER, FOR SOLUTION INTRAMUSCULAR; INTRAVENOUS at 08:35

## 2023-03-08 RX ADMIN — Medication 10 ML: at 08:34

## 2023-03-08 RX ADMIN — FERROUS SULFATE TAB 325 MG (65 MG ELEMENTAL FE) 325 MG: 325 (65 FE) TAB at 08:25

## 2023-03-08 RX ADMIN — APIXABAN 5 MG: 5 TABLET, FILM COATED ORAL at 20:40

## 2023-03-08 RX ADMIN — INSULIN ASPART 5 UNITS: 100 INJECTION, SOLUTION INTRAVENOUS; SUBCUTANEOUS at 08:36

## 2023-03-08 RX ADMIN — SACUBITRIL AND VALSARTAN 1 TABLET: 24; 26 TABLET, FILM COATED ORAL at 20:40

## 2023-03-08 RX ADMIN — VERICIGUAT 2.5 MG: 2.5 TABLET, FILM COATED ORAL at 08:33

## 2023-03-08 RX ADMIN — ARIPIPRAZOLE 10 MG: 10 TABLET ORAL at 20:40

## 2023-03-08 RX ADMIN — NYSTATIN: 100000 POWDER TOPICAL at 08:34

## 2023-03-08 RX ADMIN — GLIPIZIDE 7.5 MG: 5 TABLET ORAL at 06:35

## 2023-03-08 RX ADMIN — DICYCLOMINE HYDROCHLORIDE 10 MG: 10 CAPSULE ORAL at 08:24

## 2023-03-08 RX ADMIN — INSULIN ASPART 35 UNITS: 100 INJECTION, SOLUTION INTRAVENOUS; SUBCUTANEOUS at 17:05

## 2023-03-08 RX ADMIN — METFORMIN HYDROCHLORIDE 1000 MG: 500 TABLET ORAL at 08:25

## 2023-03-08 RX ADMIN — BACLOFEN 30 MG: 10 TABLET ORAL at 11:10

## 2023-03-08 RX ADMIN — SPIRONOLACTONE 25 MG: 25 TABLET ORAL at 08:22

## 2023-03-08 RX ADMIN — OXYCODONE HYDROCHLORIDE AND ACETAMINOPHEN 500 MG: 500 TABLET ORAL at 08:24

## 2023-03-08 RX ADMIN — DULOXETINE HYDROCHLORIDE 60 MG: 60 CAPSULE, DELAYED RELEASE ORAL at 08:23

## 2023-03-08 RX ADMIN — Medication 1 CAPSULE: at 08:24

## 2023-03-08 RX ADMIN — INSULIN DETEMIR 65 UNITS: 100 INJECTION, SOLUTION SUBCUTANEOUS at 08:35

## 2023-03-08 RX ADMIN — GLIPIZIDE 7.5 MG: 5 TABLET ORAL at 17:04

## 2023-03-08 RX ADMIN — EMPAGLIFLOZIN 10 MG: 10 TABLET, FILM COATED ORAL at 08:23

## 2023-03-08 RX ADMIN — METFORMIN HYDROCHLORIDE 1000 MG: 500 TABLET ORAL at 17:04

## 2023-03-08 RX ADMIN — VITAMINS A AND D OINTMENT 1 APPLICATION: 15.5; 53.4 OINTMENT TOPICAL at 08:34

## 2023-03-08 NOTE — PLAN OF CARE
Goal Outcome Evaluation:  Plan of Care Reviewed With: patient        Progress: no change   Pt resting well. Wound care done per orders. No acute changes during shift will continue to monitor and follow current plan of care

## 2023-03-08 NOTE — PLAN OF CARE
Goal Outcome Evaluation:  Plan of Care Reviewed With: patient        Progress: no change  Outcome Evaluation: Patient rested in bed during shift. Wound vac changed per wound care nurse. Wound care done per orders. Will continue with plan of care.
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
## 2023-03-08 NOTE — NURSING NOTE
Dressing loose and NPWT VAC beeping when entered room.  NPWT VAC dressing changed per protocol.  See attached flow sheet documentation.  Patient slept through procedure.         03/08/23 1405   Wound 02/13/23 1059 sacral spine Incision   Placement Date/Time: 02/13/23 1059   Present on Hospital Admission: No  Location: (c) sacral spine  Primary Wound Type: (c) Incision   Pressure Injury Stage 4   Dressing Appearance moist drainage;dressing loose   Closure None   Base moist;red;yellow;slough   Red (%), Wound Tissue Color 25   Yellow (%), Wound Tissue Color 50   Periwound intact;moist;macerated;redness   Periwound Temperature warm   Periwound Skin Turgor soft   Edges open   Wound Length (cm) 11 cm   Wound Width (cm) 4 cm   Wound Depth (cm) 4.8 cm   Wound Surface Area (cm^2) 44 cm^2   Wound Volume (cm^3) 211.2 cm^3   Tunneling [Depth (cm)/Location] 0   Undermining [Depth (cm)/Location] 0   Drainage Characteristics/Odor serosanguineous   Drainage Amount moderate   Care, Wound cleansed with;wound cleanser;negative pressure wound therapy   Dressing Care dressing changed   Periwound Care barrier film applied   NPWT (Negative Pressure Wound Therapy) 02/28/23 1654 RT gluteal   Placement Date/Time: 02/28/23 1654   Location: RT gluteal   Therapy Setting continuous therapy   Dressing foam, black   Pressure Setting 125 mmHg   Sponges Inserted 3   Sponges Removed 2   Finger sweep complete Yes

## 2023-03-08 NOTE — CASE MANAGEMENT/SOCIAL WORK
Discharge Planning Assessment   Fabricio     Patient Name: Ken Krueger  MRN: [id number]  Today's Date: 3/8/2023    Admit Date: 2/21/2023    Plan: Pt was admitted to Swing bed on 2/21/23.  Pt was approved for additional swing bed days with updates due 3/14/23. SS to follow.   Discharge Plan     Row Name 03/08/23 1351       Plan    Plan Pt was admitted to Swing bed on 2/21/23.  Pt was approved for additional swing bed days with updates due 3/14/23. SS to follow.                HUMPHREY Loya

## 2023-03-09 LAB
ANION GAP SERPL CALCULATED.3IONS-SCNC: 13.2 MMOL/L (ref 5–15)
BASOPHILS # BLD AUTO: 0.04 10*3/MM3 (ref 0–0.2)
BASOPHILS NFR BLD AUTO: 0.3 % (ref 0–1.5)
BUN SERPL-MCNC: 47 MG/DL (ref 6–20)
BUN/CREAT SERPL: 49.5 (ref 7–25)
CALCIUM SPEC-SCNC: 9.7 MG/DL (ref 8.6–10.5)
CHLORIDE SERPL-SCNC: 100 MMOL/L (ref 98–107)
CO2 SERPL-SCNC: 22.8 MMOL/L (ref 22–29)
CREAT SERPL-MCNC: 0.95 MG/DL (ref 0.76–1.27)
DEPRECATED RDW RBC AUTO: 46.5 FL (ref 37–54)
EGFRCR SERPLBLD CKD-EPI 2021: 100.6 ML/MIN/1.73
EOSINOPHIL # BLD AUTO: 0.21 10*3/MM3 (ref 0–0.4)
EOSINOPHIL NFR BLD AUTO: 1.8 % (ref 0.3–6.2)
ERYTHROCYTE [DISTWIDTH] IN BLOOD BY AUTOMATED COUNT: 14.2 % (ref 12.3–15.4)
GLUCOSE BLDC GLUCOMTR-MCNC: 200 MG/DL (ref 70–130)
GLUCOSE BLDC GLUCOMTR-MCNC: 235 MG/DL (ref 70–130)
GLUCOSE BLDC GLUCOMTR-MCNC: 323 MG/DL (ref 70–130)
GLUCOSE BLDC GLUCOMTR-MCNC: 356 MG/DL (ref 70–130)
GLUCOSE SERPL-MCNC: 260 MG/DL (ref 65–99)
HCT VFR BLD AUTO: 38.6 % (ref 37.5–51)
HGB BLD-MCNC: 11.4 G/DL (ref 13–17.7)
IMM GRANULOCYTES # BLD AUTO: 0.05 10*3/MM3 (ref 0–0.05)
IMM GRANULOCYTES NFR BLD AUTO: 0.4 % (ref 0–0.5)
INDURATION: 0 MM (ref 0–10)
LYMPHOCYTES # BLD AUTO: 1.89 10*3/MM3 (ref 0.7–3.1)
LYMPHOCYTES NFR BLD AUTO: 16.1 % (ref 19.6–45.3)
Lab: NORMAL
MCH RBC QN AUTO: 26.4 PG (ref 26.6–33)
MCHC RBC AUTO-ENTMCNC: 29.5 G/DL (ref 31.5–35.7)
MCV RBC AUTO: 89.4 FL (ref 79–97)
MONOCYTES # BLD AUTO: 0.39 10*3/MM3 (ref 0.1–0.9)
MONOCYTES NFR BLD AUTO: 3.3 % (ref 5–12)
NEUTROPHILS NFR BLD AUTO: 78.1 % (ref 42.7–76)
NEUTROPHILS NFR BLD AUTO: 9.15 10*3/MM3 (ref 1.7–7)
NRBC BLD AUTO-RTO: 0 /100 WBC (ref 0–0.2)
PLATELET # BLD AUTO: 303 10*3/MM3 (ref 140–450)
PMV BLD AUTO: 9.7 FL (ref 6–12)
POTASSIUM SERPL-SCNC: 4.2 MMOL/L (ref 3.5–5.2)
RBC # BLD AUTO: 4.32 10*6/MM3 (ref 4.14–5.8)
SODIUM SERPL-SCNC: 136 MMOL/L (ref 136–145)
TB SKIN TEST: NEGATIVE
WBC NRBC COR # BLD: 11.73 10*3/MM3 (ref 3.4–10.8)

## 2023-03-09 PROCEDURE — 99308 SBSQ NF CARE LOW MDM 20: CPT | Performed by: INTERNAL MEDICINE

## 2023-03-09 PROCEDURE — 63710000001 INSULIN ASPART PER 5 UNITS: Performed by: INTERNAL MEDICINE

## 2023-03-09 PROCEDURE — 82962 GLUCOSE BLOOD TEST: CPT

## 2023-03-09 PROCEDURE — 85025 COMPLETE CBC W/AUTO DIFF WBC: CPT | Performed by: INTERNAL MEDICINE

## 2023-03-09 PROCEDURE — 25010000002 CEFTRIAXONE PER 250 MG: Performed by: STUDENT IN AN ORGANIZED HEALTH CARE EDUCATION/TRAINING PROGRAM

## 2023-03-09 PROCEDURE — 63710000001 INSULIN DETEMIR PER 5 UNITS: Performed by: INTERNAL MEDICINE

## 2023-03-09 PROCEDURE — 80048 BASIC METABOLIC PNL TOTAL CA: CPT | Performed by: INTERNAL MEDICINE

## 2023-03-09 RX ORDER — INSULIN ASPART 100 [IU]/ML
40 INJECTION, SOLUTION INTRAVENOUS; SUBCUTANEOUS
Status: DISCONTINUED | OUTPATIENT
Start: 2023-03-09 | End: 2023-03-14

## 2023-03-09 RX ADMIN — GLIPIZIDE 10 MG: 5 TABLET ORAL at 08:50

## 2023-03-09 RX ADMIN — GABAPENTIN 800 MG: 400 CAPSULE ORAL at 20:55

## 2023-03-09 RX ADMIN — MAGNESIUM GLUCONATE 500 MG ORAL TABLET 1200 MG: 500 TABLET ORAL at 08:37

## 2023-03-09 RX ADMIN — METFORMIN HYDROCHLORIDE 1000 MG: 500 TABLET ORAL at 17:22

## 2023-03-09 RX ADMIN — CARVEDILOL 12.5 MG: 6.25 TABLET, FILM COATED ORAL at 17:22

## 2023-03-09 RX ADMIN — NYSTATIN: 100000 POWDER TOPICAL at 11:23

## 2023-03-09 RX ADMIN — DICYCLOMINE HYDROCHLORIDE 10 MG: 10 CAPSULE ORAL at 20:54

## 2023-03-09 RX ADMIN — BACLOFEN 30 MG: 10 TABLET ORAL at 08:40

## 2023-03-09 RX ADMIN — INSULIN ASPART 16 UNITS: 100 INJECTION, SOLUTION INTRAVENOUS; SUBCUTANEOUS at 17:22

## 2023-03-09 RX ADMIN — INSULIN ASPART 20 UNITS: 100 INJECTION, SOLUTION INTRAVENOUS; SUBCUTANEOUS at 11:25

## 2023-03-09 RX ADMIN — PANTOPRAZOLE SODIUM 40 MG: 40 TABLET, DELAYED RELEASE ORAL at 08:46

## 2023-03-09 RX ADMIN — DULOXETINE HYDROCHLORIDE 60 MG: 60 CAPSULE, DELAYED RELEASE ORAL at 20:54

## 2023-03-09 RX ADMIN — MODAFINIL 200 MG: 100 TABLET ORAL at 08:48

## 2023-03-09 RX ADMIN — BACLOFEN 30 MG: 10 TABLET ORAL at 20:54

## 2023-03-09 RX ADMIN — GABAPENTIN 800 MG: 400 CAPSULE ORAL at 17:22

## 2023-03-09 RX ADMIN — ATORVASTATIN CALCIUM 10 MG: 10 TABLET, FILM COATED ORAL at 20:54

## 2023-03-09 RX ADMIN — Medication 1 TABLET: at 08:33

## 2023-03-09 RX ADMIN — Medication 10 ML: at 21:07

## 2023-03-09 RX ADMIN — INSULIN DETEMIR 65 UNITS: 100 INJECTION, SOLUTION SUBCUTANEOUS at 08:28

## 2023-03-09 RX ADMIN — VITAMINS A AND D OINTMENT 1 APPLICATION: 15.5; 53.4 OINTMENT TOPICAL at 22:36

## 2023-03-09 RX ADMIN — INSULIN ASPART 40 UNITS: 100 INJECTION, SOLUTION INTRAVENOUS; SUBCUTANEOUS at 17:21

## 2023-03-09 RX ADMIN — INSULIN DETEMIR 70 UNITS: 100 INJECTION, SOLUTION SUBCUTANEOUS at 20:55

## 2023-03-09 RX ADMIN — NYSTATIN: 100000 POWDER TOPICAL at 22:36

## 2023-03-09 RX ADMIN — VERICIGUAT 2.5 MG: 2.5 TABLET, FILM COATED ORAL at 08:30

## 2023-03-09 RX ADMIN — INSULIN ASPART 8 UNITS: 100 INJECTION, SOLUTION INTRAVENOUS; SUBCUTANEOUS at 08:00

## 2023-03-09 RX ADMIN — BUMETANIDE 2 MG: 1 TABLET ORAL at 08:52

## 2023-03-09 RX ADMIN — ARIPIPRAZOLE 10 MG: 10 TABLET ORAL at 20:54

## 2023-03-09 RX ADMIN — EMPAGLIFLOZIN 10 MG: 10 TABLET, FILM COATED ORAL at 08:45

## 2023-03-09 RX ADMIN — DICYCLOMINE HYDROCHLORIDE 10 MG: 10 CAPSULE ORAL at 08:33

## 2023-03-09 RX ADMIN — CARVEDILOL 12.5 MG: 6.25 TABLET, FILM COATED ORAL at 08:33

## 2023-03-09 RX ADMIN — INSULIN ASPART 35 UNITS: 100 INJECTION, SOLUTION INTRAVENOUS; SUBCUTANEOUS at 08:28

## 2023-03-09 RX ADMIN — FERROUS SULFATE TAB 325 MG (65 MG ELEMENTAL FE) 325 MG: 325 (65 FE) TAB at 08:44

## 2023-03-09 RX ADMIN — Medication 1 CAPSULE: at 08:32

## 2023-03-09 RX ADMIN — METFORMIN HYDROCHLORIDE 1000 MG: 500 TABLET ORAL at 08:47

## 2023-03-09 RX ADMIN — SUCRALFATE 1 G: 1 TABLET ORAL at 08:53

## 2023-03-09 RX ADMIN — Medication 10 ML: at 11:24

## 2023-03-09 RX ADMIN — GABAPENTIN 800 MG: 400 CAPSULE ORAL at 08:37

## 2023-03-09 RX ADMIN — SACUBITRIL AND VALSARTAN 1 TABLET: 24; 26 TABLET, FILM COATED ORAL at 20:54

## 2023-03-09 RX ADMIN — SPIRONOLACTONE 25 MG: 25 TABLET ORAL at 08:46

## 2023-03-09 RX ADMIN — VITAMINS A AND D OINTMENT 1 APPLICATION: 15.5; 53.4 OINTMENT TOPICAL at 11:23

## 2023-03-09 RX ADMIN — APIXABAN 5 MG: 5 TABLET, FILM COATED ORAL at 08:47

## 2023-03-09 RX ADMIN — BACLOFEN 30 MG: 10 TABLET ORAL at 11:24

## 2023-03-09 RX ADMIN — SACUBITRIL AND VALSARTAN 1 TABLET: 24; 26 TABLET, FILM COATED ORAL at 08:49

## 2023-03-09 RX ADMIN — APIXABAN 5 MG: 5 TABLET, FILM COATED ORAL at 20:54

## 2023-03-09 RX ADMIN — CEFTRIAXONE 2 G: 2 INJECTION, POWDER, FOR SOLUTION INTRAMUSCULAR; INTRAVENOUS at 08:29

## 2023-03-09 RX ADMIN — INSULIN ASPART 35 UNITS: 100 INJECTION, SOLUTION INTRAVENOUS; SUBCUTANEOUS at 11:24

## 2023-03-09 RX ADMIN — DULOXETINE HYDROCHLORIDE 60 MG: 60 CAPSULE, DELAYED RELEASE ORAL at 08:51

## 2023-03-09 RX ADMIN — OXYCODONE HYDROCHLORIDE AND ACETAMINOPHEN 500 MG: 500 TABLET ORAL at 08:31

## 2023-03-09 RX ADMIN — PANTOPRAZOLE SODIUM 40 MG: 40 TABLET, DELAYED RELEASE ORAL at 20:55

## 2023-03-09 RX ADMIN — BACLOFEN 30 MG: 10 TABLET ORAL at 17:22

## 2023-03-09 RX ADMIN — Medication 1000 UNITS: at 08:42

## 2023-03-09 RX ADMIN — GLIPIZIDE 10 MG: 5 TABLET ORAL at 17:22

## 2023-03-09 NOTE — PROGRESS NOTES
Nutrition Services    Patient Name:  Ken Krueger  YOB: 1975  MRN: [id number]  Admit Date:  2/21/2023    BMI: 42.34  Diet: Consistent carb high protein diet  Supplement: Thanh BID  Intake: 92% avg x 12 meals  Fluid: meeting needs    Electronically signed by:  Krain Aceves RD  03/09/23 11:06 EST

## 2023-03-09 NOTE — PLAN OF CARE
Goal Outcome Evaluation:  Plan of Care Reviewed With: patient        Progress: no change  Outcome Evaluation: wound care complete. wound vac in place. no acute changes noted. will continue with poc
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
## 2023-03-09 NOTE — PLAN OF CARE
Goal Outcome Evaluation:           Progress: no change  Outcome Evaluation: Patient has been resting in bed throughout the shift, wound care has been done per orders, patient has had no complains or concerns.

## 2023-03-09 NOTE — PROGRESS NOTES
PROGRESS NOTE         Patient Identification:  Name:  Ken Krueger  Age:  47 y.o.  Sex:  male  :  1975  MRN:  [id number]  Visit Number:  [id number]  Primary Care Provider:  Franchesca Hodges APRN         LOS: 16 days       ----------------------------------------------------------------------------------------------------------------------  Subjective       Chief Complaints:    No chief complaint on file.        Interval History:      Patient is sitting up in bed on CPAP in no apparent distress.  Nurse and nursing student are at bedside.  Denies any new issues or complaints at this time.  Lungs are clear to auscultation bilaterally.  Abdomen is soft, nontender, normal bowel sounds.  Afebrile, no diarrhea.  WBC is slightly worsened at 11.73, which we will monitor for now.    Review of Systems:    Constitutional: no fever, chills and night sweats.  No fatigue.  Eyes: no eye drainage, itching or redness.  HEENT: no mouth sores, dysphagia or nose bleed.  Respiratory: no for shortness of breath, cough, or production of sputum.  Cardiovascular: no chest pain, no palpitations, no orthopnea.  Gastrointestinal: no nausea, vomiting or diarrhea. No abdominal pain, hematemesis or rectal bleeding.  Genitourinary: no dysuria or polyuria.  Hematologic/lymphatic: no lymph node abnormalities, no easy bruising or easy bleeding.  Musculoskeletal: No muscle or joint pain.  Skin: No rash and no itching.  Decubitus ulcers.  Neurological: no loss of consciousness, no seizure, no headache.  Behavioral/Psych: no depression or suicidal ideation.  Endocrine: no hot flashes.  Immunologic: negative.    ----------------------------------------------------------------------------------------------------------------------      Objective       Current Hospital Meds:    Pharmacy Consult - Pharmacy to dose,        ----------------------------------------------------------------------------------------------------------------------    Vital Signs:  Temp:  [97.7 °F (36.5 °C)-98 °F (36.7 °C)] 98 °F (36.7 °C)  Heart Rate:  [80-86] 86  Resp:  [18] 18  BP: (107-170)/(60-89) 119/72  No data found.  SpO2 Percentage    03/07/23 1900 03/08/23 0654 03/08/23 1826   SpO2: 98% 97% 98%     SpO2:  [98 %] 98 %  on   ;   Device (Oxygen Therapy): room air    Body mass index is 42.34 kg/m².  Wt Readings from Last 3 Encounters:   02/21/23 (!) 138 kg (303 lb 9.2 oz)   02/20/23 (!) 139 kg (306 lb 14.1 oz)   12/13/22 (!) 141 kg (310 lb 14.4 oz)        Intake/Output Summary (Last 24 hours) at 3/9/2023 0947  Last data filed at 3/9/2023 0900  Gross per 24 hour   Intake 2660 ml   Output 6700 ml   Net -4040 ml     Diet: Diabetic Diets; Consistent Carbohydrate; Texture: Regular Texture (IDDSI 7); Fluid Consistency: Thin (IDDSI 0)  ----------------------------------------------------------------------------------------------------------------------      Physical Exam:    Constitutional: Morbidly obese  male is sitting up in bed on CPAP in no apparent distress.  Nurse and nursing student are at bedside.  HENT:  Head: Normocephalic and atraumatic.  Mouth:  Moist mucous membranes.    Eyes:  Conjunctivae and EOM are normal.  No scleral icterus.  Neck:  Neck supple.  No JVD present.    Cardiovascular:  Normal rate, regular rhythm and normal heart sounds with no murmur. No edema.  Pulmonary/Chest:  No respiratory distress, no wheezes, no crackles, with normal breath sounds and good air movement.  Lungs clear to auscultation bilaterally  Abdominal:  Morbidly obese.  Soft.  Bowel sounds are normal.  No distension and no tenderness.  Ileal conduit in place.  Colostomy bag in place with soft stool.  Musculoskeletal:  No tenderness.  No swelling or redness of joints.  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 PICC to right upper arm with no signs of  infection.  Neurological:  Alert and oriented to person, place, and time.  No facial droop.  No slurred speech.   Skin:  Stage IV sacral decubitus wound status post excisional debridement.  Psychiatric:  Normal mood and affect.  Behavior is normal.      ----------------------------------------------------------------------------------------------------------------------            Results from last 7 days   Lab Units 03/09/23  0208 03/07/23  0322   WBC 10*3/mm3 11.73* 9.68   HEMOGLOBIN g/dL 11.4* 11.1*   HEMATOCRIT % 38.6 37.9   MCV fL 89.4 89.8   MCHC g/dL 29.5* 29.3*   PLATELETS 10*3/mm3 303 284     Results from last 7 days   Lab Units 03/09/23  0208 03/07/23  0322   SODIUM mmol/L 136 138   POTASSIUM mmol/L 4.2 4.1   CHLORIDE mmol/L 100 103   CO2 mmol/L 22.8 23.8   BUN mg/dL 47* 46*   CREATININE mg/dL 0.95 0.93   CALCIUM mg/dL 9.7 9.2   GLUCOSE mg/dL 260* 312*   Estimated Creatinine Clearance: 138.9 mL/min (by C-G formula based on SCr of 0.95 mg/dL).  No results found for: AMMONIA    Glucose   Date/Time Value Ref Range Status   03/09/2023 0606 235 (H) 70 - 130 mg/dL Final     Comment:     Meter: [id number] : [id number] CINDY STRONG   03/08/2023 2038 262 (H) 70 - 130 mg/dL Final     Comment:     Meter: [id number] : [id number] kathy mazariegoselle kristy   03/08/2023 1634 294 (H) 70 - 130 mg/dL Final     Comment:     Meter: [id number] : [id number] karlos arthur   03/08/2023 1034 316 (H) 70 - 130 mg/dL Final     Comment:     Meter: [id number] : [id number] ALBERTINA FROST   03/08/2023 0609 247 (H) 70 - 130 mg/dL Final     Comment:     Meter: [id number] : [id number] RUBAMARTIN ASIF   03/07/2023 1940 278 (H) 70 - 130 mg/dL Final     Comment:     Meter: [id number] : [id number] WILBER BERTRAND   03/07/2023 1649 228 (H) 70 - 130 mg/dL Final     Comment:     Meter: [id number] : [id number] Cierra De   03/07/2023 1039 298 (H) 70 - 130 mg/dL Final     Comment:     Meter: [id number] : [id number] Cierra  Santino     Lab Results   Component Value Date    HGBA1C 11.50 (H) 02/13/2023     Lab Results   Component Value Date    TSH 3.780 07/19/2022    FREET4 1.31 06/02/2021       Blood Culture   Date Value Ref Range Status   02/12/2023 No growth at 24 hours  Preliminary   02/12/2023 No growth at 24 hours  Preliminary     No results found for: URINECX  No results found for: WOUNDCX  No results found for: STOOLCX  No results found for: RESPCX  Pain Management Panel       Pain Management Panel Latest Ref Rng & Units 6/2/2021 8/19/2018    AMPHETAMINES SCREEN, URINE Negative Negative Negative    BARBITURATES SCREEN Negative Negative Negative    BENZODIAZEPINE SCREEN, URINE Negative Negative Negative    BUPRENORPHINEUR Negative Negative Negative    COCAINE SCREEN, URINE Negative Negative Negative    METHADONE SCREEN, URINE Negative Negative Negative              ----------------------------------------------------------------------------------------------------------------------  Imaging Results (Last 24 Hours)       ** No results found for the last 24 hours. **            -----------------------------------------------------------------------------------  Pertinent Infectious Disease Results     CT abdomen pelvis with contrast on 2/12/2023 showed there is a new decubitus ulcer just to the right of midline at the level of the coccyx with constellation of imaging findings most characteristic of acute infection/osteomyelitis. Small pocket of air and fluid adjacent to the coccyx measuring 3.7 x 4.1 cm could reflect phlegmon or abscess. Chronic bilateral decubitus ulcers at the level of the ischial tuberosities bilaterally with imaging findings suggestive of chronic infection/osteomyelitis, similar to prior. Hepatic steatosis and hepatomegaly with borderline splenomegaly. Nonobstructing left renal stones. Postoperative changes of left lower quadrant and colostomy and right mid abdominal ileal conduit formation. Diverticulosis. No  diverticulitis or bowel obstruction. COVID-19 and flu A/B PCR on 2/12/2023 was negative.    Status post excisional debridement of sacral ulcer on 2/13/2023 with Dr. Taylor.         Assessment/Plan         ASSESSMENT:    Septic shock with lactic acid greater than 4 on admission  Acute and chronic decubitus ulcerations with osteomyelitis and possible abscess      PLAN:    I have seen and examined the patient myself this morning with Christy Caro PA-C and pharmacist Esther and discussed overnight changes with primary RN here are my findings:    The patient is currently sitting up in bed and receiving CPAP support, and does not appear to be in any distress.    The nurse and a nursing student are present at the bedside. The patient denies any new issues or complaints at this time.    Upon examination, the patient's lungs are clear bilaterally, while their abdomen is soft, nontender, and has normal bowel sounds. The patient is afebrile and is not experiencing diarrhea. However, WBC has slightly worsened to 11.73, which we will monitor closely. Based on the assessment, I recommend ceftriaxone 2 g IV every 24 hours for osteomyelitis until 3/29/2023.      Code Status:   Code Status and Medical Interventions:   Ordered at: 02/21/23 1034     Code Status (Patient has no pulse and is not breathing):    CPR (Attempt to Resuscitate)     Medical Interventions (Patient has pulse or is breathing):    Full Support       Christy Caro PA-C  03/09/23  09:47 EST    Electronically signed by Christy Caro PA-C, 03/09/23, 9:49 AM EST.    Electronically signed by Tra Jain MD, 03/09/23, 10:27 AM EST.

## 2023-03-10 LAB
BASOPHILS # BLD AUTO: 0.03 10*3/MM3 (ref 0–0.2)
BASOPHILS NFR BLD AUTO: 0.3 % (ref 0–1.5)
DEPRECATED RDW RBC AUTO: 47.4 FL (ref 37–54)
EOSINOPHIL # BLD AUTO: 0.22 10*3/MM3 (ref 0–0.4)
EOSINOPHIL NFR BLD AUTO: 2.2 % (ref 0.3–6.2)
ERYTHROCYTE [DISTWIDTH] IN BLOOD BY AUTOMATED COUNT: 14.5 % (ref 12.3–15.4)
GLUCOSE BLDC GLUCOMTR-MCNC: 201 MG/DL (ref 70–130)
GLUCOSE BLDC GLUCOMTR-MCNC: 218 MG/DL (ref 70–130)
GLUCOSE BLDC GLUCOMTR-MCNC: 264 MG/DL (ref 70–130)
GLUCOSE BLDC GLUCOMTR-MCNC: 297 MG/DL (ref 70–130)
HCT VFR BLD AUTO: 37.4 % (ref 37.5–51)
HGB BLD-MCNC: 10.8 G/DL (ref 13–17.7)
HYPOCHROMIA BLD QL: NORMAL
IMM GRANULOCYTES # BLD AUTO: 0.06 10*3/MM3 (ref 0–0.05)
IMM GRANULOCYTES NFR BLD AUTO: 0.6 % (ref 0–0.5)
LYMPHOCYTES # BLD AUTO: 1.71 10*3/MM3 (ref 0.7–3.1)
LYMPHOCYTES NFR BLD AUTO: 16.8 % (ref 19.6–45.3)
MCH RBC QN AUTO: 25.8 PG (ref 26.6–33)
MCHC RBC AUTO-ENTMCNC: 28.9 G/DL (ref 31.5–35.7)
MCV RBC AUTO: 89.5 FL (ref 79–97)
MONOCYTES # BLD AUTO: 0.32 10*3/MM3 (ref 0.1–0.9)
MONOCYTES NFR BLD AUTO: 3.1 % (ref 5–12)
NEUTROPHILS NFR BLD AUTO: 7.83 10*3/MM3 (ref 1.7–7)
NEUTROPHILS NFR BLD AUTO: 77 % (ref 42.7–76)
NRBC BLD AUTO-RTO: 0 /100 WBC (ref 0–0.2)
PLAT MORPH BLD: NORMAL
PLATELET # BLD AUTO: 281 10*3/MM3 (ref 140–450)
PMV BLD AUTO: 10 FL (ref 6–12)
RBC # BLD AUTO: 4.18 10*6/MM3 (ref 4.14–5.8)
WBC NRBC COR # BLD: 10.17 10*3/MM3 (ref 3.4–10.8)

## 2023-03-10 PROCEDURE — 85007 BL SMEAR W/DIFF WBC COUNT: CPT | Performed by: INTERNAL MEDICINE

## 2023-03-10 PROCEDURE — 85025 COMPLETE CBC W/AUTO DIFF WBC: CPT | Performed by: INTERNAL MEDICINE

## 2023-03-10 PROCEDURE — 82962 GLUCOSE BLOOD TEST: CPT

## 2023-03-10 PROCEDURE — 25010000002 CEFTRIAXONE PER 250 MG: Performed by: STUDENT IN AN ORGANIZED HEALTH CARE EDUCATION/TRAINING PROGRAM

## 2023-03-10 PROCEDURE — 63710000001 INSULIN DETEMIR PER 5 UNITS: Performed by: INTERNAL MEDICINE

## 2023-03-10 PROCEDURE — 99308 SBSQ NF CARE LOW MDM 20: CPT | Performed by: INTERNAL MEDICINE

## 2023-03-10 PROCEDURE — 63710000001 INSULIN ASPART PER 5 UNITS: Performed by: INTERNAL MEDICINE

## 2023-03-10 RX ADMIN — INSULIN DETEMIR 70 UNITS: 100 INJECTION, SOLUTION SUBCUTANEOUS at 20:24

## 2023-03-10 RX ADMIN — CEFTRIAXONE 2 G: 2 INJECTION, POWDER, FOR SOLUTION INTRAMUSCULAR; INTRAVENOUS at 08:35

## 2023-03-10 RX ADMIN — BUMETANIDE 2 MG: 1 TABLET ORAL at 08:17

## 2023-03-10 RX ADMIN — Medication 1000 UNITS: at 08:14

## 2023-03-10 RX ADMIN — APIXABAN 5 MG: 5 TABLET, FILM COATED ORAL at 08:19

## 2023-03-10 RX ADMIN — INSULIN ASPART 40 UNITS: 100 INJECTION, SOLUTION INTRAVENOUS; SUBCUTANEOUS at 08:27

## 2023-03-10 RX ADMIN — ARIPIPRAZOLE 10 MG: 10 TABLET ORAL at 20:24

## 2023-03-10 RX ADMIN — GABAPENTIN 800 MG: 400 CAPSULE ORAL at 20:24

## 2023-03-10 RX ADMIN — DULOXETINE HYDROCHLORIDE 60 MG: 60 CAPSULE, DELAYED RELEASE ORAL at 20:24

## 2023-03-10 RX ADMIN — BACLOFEN 30 MG: 10 TABLET ORAL at 11:09

## 2023-03-10 RX ADMIN — MAGNESIUM GLUCONATE 500 MG ORAL TABLET 1200 MG: 500 TABLET ORAL at 08:23

## 2023-03-10 RX ADMIN — BACLOFEN 30 MG: 10 TABLET ORAL at 16:51

## 2023-03-10 RX ADMIN — NYSTATIN: 100000 POWDER TOPICAL at 16:50

## 2023-03-10 RX ADMIN — INSULIN ASPART 12 UNITS: 100 INJECTION, SOLUTION INTRAVENOUS; SUBCUTANEOUS at 16:51

## 2023-03-10 RX ADMIN — VITAMINS A AND D OINTMENT 1 APPLICATION: 15.5; 53.4 OINTMENT TOPICAL at 08:35

## 2023-03-10 RX ADMIN — APIXABAN 5 MG: 5 TABLET, FILM COATED ORAL at 20:24

## 2023-03-10 RX ADMIN — Medication 1 TABLET: at 08:16

## 2023-03-10 RX ADMIN — INSULIN ASPART 8 UNITS: 100 INJECTION, SOLUTION INTRAVENOUS; SUBCUTANEOUS at 08:25

## 2023-03-10 RX ADMIN — BACLOFEN 30 MG: 10 TABLET ORAL at 20:24

## 2023-03-10 RX ADMIN — MODAFINIL 200 MG: 100 TABLET ORAL at 09:59

## 2023-03-10 RX ADMIN — NYSTATIN: 100000 POWDER TOPICAL at 20:25

## 2023-03-10 RX ADMIN — ATORVASTATIN CALCIUM 10 MG: 10 TABLET, FILM COATED ORAL at 20:24

## 2023-03-10 RX ADMIN — EMPAGLIFLOZIN 10 MG: 10 TABLET, FILM COATED ORAL at 08:16

## 2023-03-10 RX ADMIN — DICYCLOMINE HYDROCHLORIDE 10 MG: 10 CAPSULE ORAL at 08:16

## 2023-03-10 RX ADMIN — VERICIGUAT 2.5 MG: 2.5 TABLET, FILM COATED ORAL at 08:34

## 2023-03-10 RX ADMIN — FERROUS SULFATE TAB 325 MG (65 MG ELEMENTAL FE) 325 MG: 325 (65 FE) TAB at 08:18

## 2023-03-10 RX ADMIN — SPIRONOLACTONE 25 MG: 25 TABLET ORAL at 08:19

## 2023-03-10 RX ADMIN — INSULIN ASPART 40 UNITS: 100 INJECTION, SOLUTION INTRAVENOUS; SUBCUTANEOUS at 16:50

## 2023-03-10 RX ADMIN — GABAPENTIN 800 MG: 400 CAPSULE ORAL at 08:13

## 2023-03-10 RX ADMIN — CARVEDILOL 12.5 MG: 6.25 TABLET, FILM COATED ORAL at 16:51

## 2023-03-10 RX ADMIN — GLIPIZIDE 10 MG: 5 TABLET ORAL at 08:21

## 2023-03-10 RX ADMIN — GABAPENTIN 800 MG: 400 CAPSULE ORAL at 16:52

## 2023-03-10 RX ADMIN — VITAMINS A AND D OINTMENT 1 APPLICATION: 15.5; 53.4 OINTMENT TOPICAL at 20:25

## 2023-03-10 RX ADMIN — SACUBITRIL AND VALSARTAN 1 TABLET: 24; 26 TABLET, FILM COATED ORAL at 20:42

## 2023-03-10 RX ADMIN — INSULIN ASPART 40 UNITS: 100 INJECTION, SOLUTION INTRAVENOUS; SUBCUTANEOUS at 11:09

## 2023-03-10 RX ADMIN — Medication 1 CAPSULE: at 08:16

## 2023-03-10 RX ADMIN — INSULIN DETEMIR 70 UNITS: 100 INJECTION, SOLUTION SUBCUTANEOUS at 08:26

## 2023-03-10 RX ADMIN — METFORMIN HYDROCHLORIDE 1000 MG: 500 TABLET ORAL at 16:51

## 2023-03-10 RX ADMIN — DICYCLOMINE HYDROCHLORIDE 10 MG: 10 CAPSULE ORAL at 20:24

## 2023-03-10 RX ADMIN — METFORMIN HYDROCHLORIDE 1000 MG: 500 TABLET ORAL at 08:23

## 2023-03-10 RX ADMIN — PANTOPRAZOLE SODIUM 40 MG: 40 TABLET, DELAYED RELEASE ORAL at 08:20

## 2023-03-10 RX ADMIN — INSULIN ASPART 8 UNITS: 100 INJECTION, SOLUTION INTRAVENOUS; SUBCUTANEOUS at 11:08

## 2023-03-10 RX ADMIN — DULOXETINE HYDROCHLORIDE 60 MG: 60 CAPSULE, DELAYED RELEASE ORAL at 08:18

## 2023-03-10 RX ADMIN — GLIPIZIDE 10 MG: 5 TABLET ORAL at 16:52

## 2023-03-10 RX ADMIN — SACUBITRIL AND VALSARTAN 1 TABLET: 24; 26 TABLET, FILM COATED ORAL at 10:57

## 2023-03-10 RX ADMIN — OXYCODONE HYDROCHLORIDE AND ACETAMINOPHEN 500 MG: 500 TABLET ORAL at 08:20

## 2023-03-10 RX ADMIN — SUCRALFATE 1 G: 1 TABLET ORAL at 08:21

## 2023-03-10 RX ADMIN — PANTOPRAZOLE SODIUM 40 MG: 40 TABLET, DELAYED RELEASE ORAL at 20:24

## 2023-03-10 RX ADMIN — CARVEDILOL 12.5 MG: 6.25 TABLET, FILM COATED ORAL at 08:22

## 2023-03-10 RX ADMIN — BACLOFEN 30 MG: 10 TABLET ORAL at 08:24

## 2023-03-10 RX ADMIN — Medication 10 ML: at 20:25

## 2023-03-10 NOTE — CASE MANAGEMENT/SOCIAL WORK
Discharge Planning Assessment   Fabricio     Patient Name: Ken Krueger  MRN: [id number]  Today's Date: 3/10/2023    Admit Date: 2/21/2023    Plan: Pt was admitted to Swing bed on 2/21/23. Pt was approved for additional swing bed days through 3/14/23 with clinical  updated due. SS to follow.     Discharge Plan     Row Name 03/10/23 0918       Plan    Plan Pt was admitted to Swing bed on 2/21/23. Pt was approved for additional swing bed days through 3/14/23 with clinical  updated due. SS to follow.                HUMPHREY Loya

## 2023-03-10 NOTE — PLAN OF CARE
Goal Outcome Evaluation:            Pt resting in bed at this time. Pt wound care performed this shift. No other complaints at this time.

## 2023-03-10 NOTE — PROGRESS NOTES
Antimicrobial Length of Therapy:    Day 24 ceftriaxone    To continue through 3/28/23    Thank you.  Sneha Harrell, Pharm.D.  3/10/2023  15:49 EST
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
## 2023-03-10 NOTE — PROGRESS NOTES
PROGRESS NOTE         Patient Identification:  Name:  Ken Krueger  Age:  47 y.o.  Sex:  male  :  1975  MRN:  [id number]  Visit Number:  [id number]  Primary Care Provider:  Franchesca Hodges APRN         LOS: 17 days       ----------------------------------------------------------------------------------------------------------------------  Subjective       Chief Complaints:    No chief complaint on file.        Interval History:      Patient is sitting up in bed on room air in no apparent distress.  Nurse and nursing student are at bedside.  Patient has no new complaints or issues at this time.  Lungs are clear to auscultation bilaterally.  Abdomen is distended, but soft, nontender, with normal bowel sounds.  Right upper extremity PICC line without evidence of infection at this time.  Afebrile, no diarrhea.  Leukocytosis is resolved with a WBC of 10.17.    Review of Systems:    Constitutional: no fever, chills and night sweats.  No fatigue.  Eyes: no eye drainage, itching or redness.  HEENT: no mouth sores, dysphagia or nose bleed.  Respiratory: no for shortness of breath, cough, or production of sputum.  Cardiovascular: no chest pain, no palpitations, no orthopnea.  Gastrointestinal: no nausea, vomiting or diarrhea. No abdominal pain, hematemesis or rectal bleeding.  Genitourinary: no dysuria or polyuria.  Hematologic/lymphatic: no lymph node abnormalities, no easy bruising or easy bleeding.  Musculoskeletal: No muscle or joint pain.  Skin: No rash and no itching.  Decubitus ulcers.  Neurological: no loss of consciousness, no seizure, no headache.  Behavioral/Psych: no depression or suicidal ideation.  Endocrine: no hot flashes.  Immunologic: negative.    ----------------------------------------------------------------------------------------------------------------------      Objective       Current Hospital Meds:    Pharmacy Consult - Pharmacy to dose,        ----------------------------------------------------------------------------------------------------------------------    Vital Signs:  Temp:  [97.7 °F (36.5 °C)-98 °F (36.7 °C)] 97.9 °F (36.6 °C)  Heart Rate:  [79-88] 79  Resp:  [16-18] 17  BP: (104-113)/(58-65) 104/61  No data found.  SpO2 Percentage    03/09/23 1404 03/09/23 1852 03/10/23 0644   SpO2: 96% 98% 93%     SpO2:  [93 %-98 %] 93 %  on  Flow (L/min):  [3] 3;   Device (Oxygen Therapy): room air    Body mass index is 42.34 kg/m².  Wt Readings from Last 3 Encounters:   02/21/23 (!) 138 kg (303 lb 9.2 oz)   02/20/23 (!) 139 kg (306 lb 14.1 oz)   12/13/22 (!) 141 kg (310 lb 14.4 oz)        Intake/Output Summary (Last 24 hours) at 3/10/2023 1017  Last data filed at 3/10/2023 0817  Gross per 24 hour   Intake 1100 ml   Output 6425 ml   Net -5325 ml     Diet: Diabetic Diets; Consistent Carbohydrate; Texture: Regular Texture (IDDSI 7); Fluid Consistency: Thin (IDDSI 0)  ----------------------------------------------------------------------------------------------------------------------      Physical Exam:    Constitutional: Morbidly obese  male is sitting up in bed on room air in no apparent distress.  Nurse and nursing student are at bedside.  HENT:  Head: Normocephalic and atraumatic.  Mouth:  Moist mucous membranes.    Eyes:  Conjunctivae and EOM are normal.  No scleral icterus.  Neck:  Neck supple.  No JVD present.    Cardiovascular:  Normal rate, regular rhythm and normal heart sounds with no murmur. No edema.  Pulmonary/Chest:  No respiratory distress, no wheezes, no crackles, with normal breath sounds and good air movement.  Lungs clear to auscultation bilaterally  Abdominal:  Morbidly obese.  Soft.  Bowel sounds are normal.  No distension and no tenderness.  Ileal conduit in place.  Colostomy bag in place with soft stool.  Musculoskeletal:  No tenderness.  No swelling or redness of joints.  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 PICC to right upper arm  with no signs of infection.  Neurological:  Alert and oriented to person, place, and time.  No facial droop.  No slurred speech.   Skin:  Stage IV sacral decubitus wound status post excisional debridement.  Psychiatric:  Normal mood and affect.  Behavior is normal.      ----------------------------------------------------------------------------------------------------------------------            Results from last 7 days   Lab Units 03/10/23  0130 03/09/23  0208 03/07/23  0322   WBC 10*3/mm3 10.17 11.73* 9.68   HEMOGLOBIN g/dL 10.8* 11.4* 11.1*   HEMATOCRIT % 37.4* 38.6 37.9   MCV fL 89.5 89.4 89.8   MCHC g/dL 28.9* 29.5* 29.3*   PLATELETS 10*3/mm3 281 303 284     Results from last 7 days   Lab Units 03/09/23  0208 03/07/23  0322   SODIUM mmol/L 136 138   POTASSIUM mmol/L 4.2 4.1   CHLORIDE mmol/L 100 103   CO2 mmol/L 22.8 23.8   BUN mg/dL 47* 46*   CREATININE mg/dL 0.95 0.93   CALCIUM mg/dL 9.7 9.2   GLUCOSE mg/dL 260* 312*   Estimated Creatinine Clearance: 138.9 mL/min (by C-G formula based on SCr of 0.95 mg/dL).  No results found for: AMMONIA    Glucose   Date/Time Value Ref Range Status   03/10/2023 0726 201 (H) 70 - 130 mg/dL Final     Comment:     Meter: [id number] : [id number] Cedlisbeth Dash BECCA   03/09/2023 2100 200 (H) 70 - 130 mg/dL Final     Comment:     Meter: [id number] : [id number] AZUCENA SHAFFER   03/09/2023 1638 323 (H) 70 - 130 mg/dL Final     Comment:     Meter: [id number] : [id number] fausto vargas   03/09/2023 1047 356 (H) 70 - 130 mg/dL Final     Comment:     Meter: [id number] : [id number] fausto vargas   03/09/2023 0606 235 (H) 70 - 130 mg/dL Final     Comment:     Meter: [id number] : [id number] CINDY STRONG   03/08/2023 2038 262 (H) 70 - 130 mg/dL Final     Comment:     Meter: [id number] : [id number] kathy wagner   03/08/2023 1634 294 (H) 70 - 130 mg/dL Final     Comment:     Meter: [id number] : [id number] karlos arthur   03/08/2023 1034 316 (H) 70 - 130 mg/dL  Final     Comment:     Meter: [id number] : [id number] ALBERTINA FROST     Lab Results   Component Value Date    HGBA1C 11.50 (H) 02/13/2023     Lab Results   Component Value Date    TSH 3.780 07/19/2022    FREET4 1.31 06/02/2021       Blood Culture   Date Value Ref Range Status   02/12/2023 No growth at 24 hours  Preliminary   02/12/2023 No growth at 24 hours  Preliminary     No results found for: URINECX  No results found for: WOUNDCX  No results found for: STOOLCX  No results found for: RESPCX  Pain Management Panel       Pain Management Panel Latest Ref Rng & Units 6/2/2021 8/19/2018    AMPHETAMINES SCREEN, URINE Negative Negative Negative    BARBITURATES SCREEN Negative Negative Negative    BENZODIAZEPINE SCREEN, URINE Negative Negative Negative    BUPRENORPHINEUR Negative Negative Negative    COCAINE SCREEN, URINE Negative Negative Negative    METHADONE SCREEN, URINE Negative Negative Negative              ----------------------------------------------------------------------------------------------------------------------  Imaging Results (Last 24 Hours)       ** No results found for the last 24 hours. **            -----------------------------------------------------------------------------------  Pertinent Infectious Disease Results     CT abdomen pelvis with contrast on 2/12/2023 showed there is a new decubitus ulcer just to the right of midline at the level of the coccyx with constellation of imaging findings most characteristic of acute infection/osteomyelitis. Small pocket of air and fluid adjacent to the coccyx measuring 3.7 x 4.1 cm could reflect phlegmon or abscess. Chronic bilateral decubitus ulcers at the level of the ischial tuberosities bilaterally with imaging findings suggestive of chronic infection/osteomyelitis, similar to prior. Hepatic steatosis and hepatomegaly with borderline splenomegaly. Nonobstructing left renal stones. Postoperative changes of left lower quadrant and colostomy and  right mid abdominal ileal conduit formation. Diverticulosis. No diverticulitis or bowel obstruction. COVID-19 and flu A/B PCR on 2/12/2023 was negative.    Status post excisional debridement of sacral ulcer on 2/13/2023 with Dr. Taylor.         Assessment/Plan         ASSESSMENT:    Septic shock with lactic acid greater than 4 on admission  Acute and chronic decubitus ulcerations with osteomyelitis and possible abscess      PLAN:    I have seen and examined the patient myself this morning with Christy Caro PA-C and pharmacist Esther and discussed overnight changes with primary RN here are my findings:    The patient is currently sitting up in bed and appears to be in no distress, breathing normally on room air. A nurse and nursing student are present at the bedside.    The patient reports no new complaints or issues at this time. On auscultation, the lungs are clear bilaterally, and the abdomen is distended but soft, with normal bowel sounds and no tenderness. The right upper extremity PICC line is present without any signs of infection at this time. The patient is afebrile and not experiencing diarrhea. The patient's leukocytosis has resolved with a WBC of 10.17. It is recommended to continue treatment with ceftriaxone 2 g IV every 24 hours for osteomyelitis until 3/29/2023.      Code Status:   Code Status and Medical Interventions:   Ordered at: 02/21/23 1034     Code Status (Patient has no pulse and is not breathing):    CPR (Attempt to Resuscitate)     Medical Interventions (Patient has pulse or is breathing):    Full Support       Christy Caro PA-C  03/10/23  10:17 EST    Electronically signed by Christy Caro PA-C, 03/10/23, 10:19 AM EST.      Electronically signed by Tra Jain MD, 03/10/23, 12:05 PM EST.

## 2023-03-10 NOTE — PLAN OF CARE
Goal Outcome Evaluation:           Progress: no change     No change in pt condition. Pt has been resting in bed. Will continue with plan of care.

## 2023-03-11 LAB
GLUCOSE BLDC GLUCOMTR-MCNC: 185 MG/DL (ref 70–130)
GLUCOSE BLDC GLUCOMTR-MCNC: 190 MG/DL (ref 70–130)
GLUCOSE BLDC GLUCOMTR-MCNC: 198 MG/DL (ref 70–130)
GLUCOSE BLDC GLUCOMTR-MCNC: 244 MG/DL (ref 70–130)

## 2023-03-11 PROCEDURE — 94761 N-INVAS EAR/PLS OXIMETRY MLT: CPT

## 2023-03-11 PROCEDURE — 63710000001 INSULIN ASPART PER 5 UNITS: Performed by: INTERNAL MEDICINE

## 2023-03-11 PROCEDURE — 82962 GLUCOSE BLOOD TEST: CPT

## 2023-03-11 PROCEDURE — 25010000002 CEFTRIAXONE PER 250 MG: Performed by: STUDENT IN AN ORGANIZED HEALTH CARE EDUCATION/TRAINING PROGRAM

## 2023-03-11 PROCEDURE — 99308 SBSQ NF CARE LOW MDM 20: CPT | Performed by: PHYSICIAN ASSISTANT

## 2023-03-11 PROCEDURE — 63710000001 INSULIN DETEMIR PER 5 UNITS: Performed by: INTERNAL MEDICINE

## 2023-03-11 PROCEDURE — 94799 UNLISTED PULMONARY SVC/PX: CPT

## 2023-03-11 RX ADMIN — SPIRONOLACTONE 25 MG: 25 TABLET ORAL at 09:04

## 2023-03-11 RX ADMIN — NYSTATIN: 100000 POWDER TOPICAL at 20:10

## 2023-03-11 RX ADMIN — MODAFINIL 200 MG: 100 TABLET ORAL at 12:10

## 2023-03-11 RX ADMIN — INSULIN ASPART 40 UNITS: 100 INJECTION, SOLUTION INTRAVENOUS; SUBCUTANEOUS at 12:11

## 2023-03-11 RX ADMIN — GABAPENTIN 800 MG: 400 CAPSULE ORAL at 09:02

## 2023-03-11 RX ADMIN — INSULIN DETEMIR 70 UNITS: 100 INJECTION, SOLUTION SUBCUTANEOUS at 09:06

## 2023-03-11 RX ADMIN — METFORMIN HYDROCHLORIDE 1000 MG: 500 TABLET ORAL at 09:05

## 2023-03-11 RX ADMIN — INSULIN ASPART 8 UNITS: 100 INJECTION, SOLUTION INTRAVENOUS; SUBCUTANEOUS at 12:10

## 2023-03-11 RX ADMIN — SUCRALFATE 1 G: 1 TABLET ORAL at 09:05

## 2023-03-11 RX ADMIN — BACLOFEN 30 MG: 10 TABLET ORAL at 12:10

## 2023-03-11 RX ADMIN — ARIPIPRAZOLE 10 MG: 10 TABLET ORAL at 20:10

## 2023-03-11 RX ADMIN — GLIPIZIDE 10 MG: 5 TABLET ORAL at 09:03

## 2023-03-11 RX ADMIN — CARVEDILOL 12.5 MG: 6.25 TABLET, FILM COATED ORAL at 09:03

## 2023-03-11 RX ADMIN — INSULIN ASPART 4 UNITS: 100 INJECTION, SOLUTION INTRAVENOUS; SUBCUTANEOUS at 17:23

## 2023-03-11 RX ADMIN — BACLOFEN 30 MG: 10 TABLET ORAL at 17:23

## 2023-03-11 RX ADMIN — BACLOFEN 30 MG: 10 TABLET ORAL at 20:10

## 2023-03-11 RX ADMIN — ATORVASTATIN CALCIUM 10 MG: 10 TABLET, FILM COATED ORAL at 20:10

## 2023-03-11 RX ADMIN — DULOXETINE HYDROCHLORIDE 60 MG: 60 CAPSULE, DELAYED RELEASE ORAL at 20:10

## 2023-03-11 RX ADMIN — NYSTATIN: 100000 POWDER TOPICAL at 09:09

## 2023-03-11 RX ADMIN — GABAPENTIN 800 MG: 400 CAPSULE ORAL at 17:23

## 2023-03-11 RX ADMIN — INSULIN ASPART 40 UNITS: 100 INJECTION, SOLUTION INTRAVENOUS; SUBCUTANEOUS at 09:06

## 2023-03-11 RX ADMIN — FERROUS SULFATE TAB 325 MG (65 MG ELEMENTAL FE) 325 MG: 325 (65 FE) TAB at 09:05

## 2023-03-11 RX ADMIN — INSULIN ASPART 4 UNITS: 100 INJECTION, SOLUTION INTRAVENOUS; SUBCUTANEOUS at 10:16

## 2023-03-11 RX ADMIN — SACUBITRIL AND VALSARTAN 1 TABLET: 24; 26 TABLET, FILM COATED ORAL at 20:10

## 2023-03-11 RX ADMIN — CEFTRIAXONE 2 G: 2 INJECTION, POWDER, FOR SOLUTION INTRAMUSCULAR; INTRAVENOUS at 09:01

## 2023-03-11 RX ADMIN — DICYCLOMINE HYDROCHLORIDE 10 MG: 10 CAPSULE ORAL at 20:10

## 2023-03-11 RX ADMIN — MAGNESIUM GLUCONATE 500 MG ORAL TABLET 1200 MG: 500 TABLET ORAL at 09:04

## 2023-03-11 RX ADMIN — VITAMINS A AND D OINTMENT 1 APPLICATION: 15.5; 53.4 OINTMENT TOPICAL at 20:10

## 2023-03-11 RX ADMIN — INSULIN DETEMIR 70 UNITS: 100 INJECTION, SOLUTION SUBCUTANEOUS at 20:10

## 2023-03-11 RX ADMIN — PANTOPRAZOLE SODIUM 40 MG: 40 TABLET, DELAYED RELEASE ORAL at 09:05

## 2023-03-11 RX ADMIN — Medication 10 ML: at 09:08

## 2023-03-11 RX ADMIN — Medication 1000 UNITS: at 09:03

## 2023-03-11 RX ADMIN — DICYCLOMINE HYDROCHLORIDE 10 MG: 10 CAPSULE ORAL at 09:06

## 2023-03-11 RX ADMIN — EMPAGLIFLOZIN 10 MG: 10 TABLET, FILM COATED ORAL at 09:03

## 2023-03-11 RX ADMIN — OXYCODONE HYDROCHLORIDE AND ACETAMINOPHEN 500 MG: 500 TABLET ORAL at 09:05

## 2023-03-11 RX ADMIN — VERICIGUAT 2.5 MG: 2.5 TABLET, FILM COATED ORAL at 09:06

## 2023-03-11 RX ADMIN — INSULIN ASPART 40 UNITS: 100 INJECTION, SOLUTION INTRAVENOUS; SUBCUTANEOUS at 17:24

## 2023-03-11 RX ADMIN — GABAPENTIN 800 MG: 400 CAPSULE ORAL at 20:10

## 2023-03-11 RX ADMIN — METFORMIN HYDROCHLORIDE 1000 MG: 500 TABLET ORAL at 17:24

## 2023-03-11 RX ADMIN — PANTOPRAZOLE SODIUM 40 MG: 40 TABLET, DELAYED RELEASE ORAL at 20:10

## 2023-03-11 RX ADMIN — BACLOFEN 30 MG: 10 TABLET ORAL at 09:02

## 2023-03-11 RX ADMIN — DULOXETINE HYDROCHLORIDE 60 MG: 60 CAPSULE, DELAYED RELEASE ORAL at 09:05

## 2023-03-11 RX ADMIN — GLIPIZIDE 10 MG: 5 TABLET ORAL at 17:24

## 2023-03-11 RX ADMIN — APIXABAN 5 MG: 5 TABLET, FILM COATED ORAL at 09:04

## 2023-03-11 RX ADMIN — Medication 1 TABLET: at 09:04

## 2023-03-11 RX ADMIN — Medication 10 ML: at 20:11

## 2023-03-11 RX ADMIN — SACUBITRIL AND VALSARTAN 1 TABLET: 24; 26 TABLET, FILM COATED ORAL at 09:05

## 2023-03-11 RX ADMIN — CARVEDILOL 12.5 MG: 6.25 TABLET, FILM COATED ORAL at 17:23

## 2023-03-11 RX ADMIN — VITAMINS A AND D OINTMENT 1 APPLICATION: 15.5; 53.4 OINTMENT TOPICAL at 09:09

## 2023-03-11 RX ADMIN — APIXABAN 5 MG: 5 TABLET, FILM COATED ORAL at 20:10

## 2023-03-11 RX ADMIN — Medication 1 CAPSULE: at 09:05

## 2023-03-11 RX ADMIN — BUMETANIDE 2 MG: 1 TABLET ORAL at 09:04

## 2023-03-11 NOTE — PLAN OF CARE
Goal Outcome Evaluation:              Outcome Evaluation: Pt is resting in bed at this time. Wound care completed per orders. Pt is using home cpap at this time. Pt voices no complaints. No acute changes noted. Will continue plan of care.

## 2023-03-11 NOTE — PROGRESS NOTES
PROGRESS NOTE         Patient Identification:  Name:  Ken Krueger  Age:  47 y.o.  Sex:  male  :  1975  MRN:  [id number]  Visit Number:  [id number]  Primary Care Provider:  Franchesca Hodges APRN         LOS: 18 days       ----------------------------------------------------------------------------------------------------------------------  Subjective       Chief Complaints:    No chief complaint on file.        Interval History:      Patient is resting comfortably in bed on CPAP in no apparent distress.  Denies any new issues or complaints at this time.  Lungs are clear to auscultation bilaterally.  Abdomen is distended, but soft, nontender, with normal bowel sounds.  Right upper extremity PICC line without evidence of infection at this time.  Wound VAC in place.  Afebrile, no increased output from ostomy.  No new labs today.    Review of Systems:    Constitutional: no fever, chills and night sweats.  No fatigue.  Eyes: no eye drainage, itching or redness.  HEENT: no mouth sores, dysphagia or nose bleed.  Respiratory: no for shortness of breath, cough, or production of sputum.  Cardiovascular: no chest pain, no palpitations, no orthopnea.  Gastrointestinal: no nausea, vomiting or diarrhea. No abdominal pain, hematemesis or rectal bleeding.  Genitourinary: no dysuria or polyuria.  Hematologic/lymphatic: no lymph node abnormalities, no easy bruising or easy bleeding.  Musculoskeletal: No muscle or joint pain.  Skin: No rash and no itching.  Decubitus ulcers.  Neurological: no loss of consciousness, no seizure, no headache.  Behavioral/Psych: no depression or suicidal ideation.  Endocrine: no hot flashes.  Immunologic: negative.    ----------------------------------------------------------------------------------------------------------------------      Objective       Current [facility] Meds:    Pharmacy Consult - Pharmacy to dose,        ----------------------------------------------------------------------------------------------------------------------    Vital Signs:  Temp:  [97.2 °F (36.2 °C)-97.8 °F (36.6 °C)] 97.2 °F (36.2 °C)  Heart Rate:  [83-91] 85  Resp:  [18] 18  BP: (114-132)/(64-74) 118/66  No data found.  SpO2 Percentage    03/09/23 1852 03/10/23 0644 03/10/23 1838   SpO2: 98% 93% 96%     SpO2:  [96 %] 96 %  on   ;   Device (Oxygen Therapy): room air    Body mass index is 42.34 kg/m².  Wt Readings from Last 3 Encounters:   02/21/23 (!) 138 kg (303 lb 9.2 oz)   02/20/23 (!) 139 kg (306 lb 14.1 oz)   12/13/22 (!) 141 kg (310 lb 14.4 oz)        Intake/Output Summary (Last 24 hours) at 3/11/2023 0945  Last data filed at 3/11/2023 0300  Gross per 24 hour   Intake 2280 ml   Output 8100 ml   Net -5820 ml     Diet: Diabetic Diets; Consistent Carbohydrate; Texture: Regular Texture (IDDSI 7); Fluid Consistency: Thin (IDDSI 0)  ----------------------------------------------------------------------------------------------------------------------      Physical Exam:    Constitutional: Morbidly obese  male is resting comfortably in bed on CPAP in no apparent distress.  HENT:  Head: Normocephalic and atraumatic.  Mouth:  Moist mucous membranes.    Eyes:  Conjunctivae and EOM are normal.  No scleral icterus.  Neck:  Neck supple.  No JVD present.    Cardiovascular:  Normal rate, regular rhythm and normal heart sounds with no murmur. No edema.  Pulmonary/Chest:  No respiratory distress, no wheezes, no crackles, with normal breath sounds and good air movement.  Lungs clear to auscultation bilaterally  Abdominal:  Morbidly obese.  Soft.  Bowel sounds are normal.  No distension and no tenderness.  Ileal conduit in place.  Colostomy bag in place with soft stool.  Musculoskeletal:  No tenderness.  No swelling or redness of joints.  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 PICC to right upper arm with no signs of infection.  Neurological:  Alert and oriented to  person, place, and time.  No facial droop.  No slurred speech.   Skin:  Stage IV sacral decubitus wound.  Wound VAC in place.  Psychiatric:  Normal mood and affect.  Behavior is normal.      ----------------------------------------------------------------------------------------------------------------------            Results from last 7 days   Lab Units 03/10/23  0130 03/09/23  0208 03/07/23  0322   WBC 10*3/mm3 10.17 11.73* 9.68   HEMOGLOBIN g/dL 10.8* 11.4* 11.1*   HEMATOCRIT % 37.4* 38.6 37.9   MCV fL 89.5 89.4 89.8   MCHC g/dL 28.9* 29.5* 29.3*   PLATELETS 10*3/mm3 281 303 284     Results from last 7 days   Lab Units 03/09/23  0208 03/07/23  0322   SODIUM mmol/L 136 138   POTASSIUM mmol/L 4.2 4.1   CHLORIDE mmol/L 100 103   CO2 mmol/L 22.8 23.8   BUN mg/dL 47* 46*   CREATININE mg/dL 0.95 0.93   CALCIUM mg/dL 9.7 9.2   GLUCOSE mg/dL 260* 312*   Estimated Creatinine Clearance: 138.9 mL/min (by C-G formula based on SCr of 0.95 mg/dL).  No results found for: AMMONIA    Glucose   Date/Time Value Ref Range Status   03/11/2023 0656 198 (H) 70 - 130 mg/dL Final     Comment:     Meter: [id number] : [id number] Maria Isabel Gorman   03/10/2023 1917 297 (H) 70 - 130 mg/dL Final     Comment:     Meter: [id number] : [id number] BIRGIT MULLER   03/10/2023 1637 264 (H) 70 - 130 mg/dL Final     Comment:     Meter: [id number] : [id number] Ced Ekta L   03/10/2023 1102 218 (H) 70 - 130 mg/dL Final     Comment:     Meter: [id number] : [id number] Ced Ekta L   03/10/2023 0726 201 (H) 70 - 130 mg/dL Final     Comment:     Meter: [id number] : [id number] Ced Ekta L   03/09/2023 2100 200 (H) 70 - 130 mg/dL Final     Comment:     Meter: [id number] : [id number] AZUCENA SHAFFER   03/09/2023 1638 323 (H) 70 - 130 mg/dL Final     Comment:     Meter: [id number] : [id number] afusto vargas   03/09/2023 1047 356 (H) 70 - 130 mg/dL Final     Comment:     Meter: [id number] : [id number] fausto vargas      Lab Results   Component Value Date    HGBA1C 11.50 (H) 02/13/2023     Lab Results   Component Value Date    TSH 3.780 07/19/2022    FREET4 1.31 06/02/2021       Blood Culture   Date Value Ref Range Status   02/12/2023 No growth at 24 hours  Preliminary   02/12/2023 No growth at 24 hours  Preliminary     No results found for: URINECX  No results found for: WOUNDCX  No results found for: STOOLCX  No results found for: RESPCX  Pain Management Panel       Pain Management Panel Latest Ref Rng & Units 6/2/2021 8/19/2018    AMPHETAMINES SCREEN, URINE Negative Negative Negative    BARBITURATES SCREEN Negative Negative Negative    BENZODIAZEPINE SCREEN, URINE Negative Negative Negative    BUPRENORPHINEUR Negative Negative Negative    COCAINE SCREEN, URINE Negative Negative Negative    METHADONE SCREEN, URINE Negative Negative Negative              ----------------------------------------------------------------------------------------------------------------------  Imaging Results (Last 24 Hours)       ** No results found for the last 24 hours. **            -----------------------------------------------------------------------------------  Pertinent Infectious Disease Results     CT abdomen pelvis with contrast on 2/12/2023 showed there is a new decubitus ulcer just to the right of midline at the level of the coccyx with constellation of imaging findings most characteristic of acute infection/osteomyelitis. Small pocket of air and fluid adjacent to the coccyx measuring 3.7 x 4.1 cm could reflect phlegmon or abscess. Chronic bilateral decubitus ulcers at the level of the ischial tuberosities bilaterally with imaging findings suggestive of chronic infection/osteomyelitis, similar to prior. Hepatic steatosis and hepatomegaly with borderline splenomegaly. Nonobstructing left renal stones. Postoperative changes of left lower quadrant and colostomy and right mid abdominal ileal conduit formation. Diverticulosis. No  diverticulitis or bowel obstruction. COVID-19 and flu A/B PCR on 2/12/2023 was negative.    Status post excisional debridement of sacral ulcer on 2/13/2023 with Dr. Taylor.     Assessment/Plan         ASSESSMENT:    Septic shock with lactic acid greater than 4 on admission  Acute and chronic decubitus ulcerations with osteomyelitis and possible abscess      PLAN:    I examined the patient this morning and he is resting comfortably in bed on CPAP in no apparent distress.  Denies any new issues or complaints at this time.  Lungs are clear to auscultation bilaterally.  Abdomen is distended, but soft, nontender, with normal bowel sounds.  Right upper extremity PICC line without evidence of infection at this time.  Wound VAC in place.  Afebrile, no increased output from ostomy.  No new labs today.    Recommend to continue on Rocephin 2 g IV every 24 hours for osteomyelitis through 3/29/2023.      Code Status:   Code Status and Medical Interventions:   Ordered at: 02/21/23 1034     Code Status (Patient has no pulse and is not breathing):    CPR (Attempt to Resuscitate)     Medical Interventions (Patient has pulse or is breathing):    Full Support       Christy Caro PA-C  03/11/23  09:45 EST

## 2023-03-11 NOTE — PLAN OF CARE
Goal Outcome Evaluation:   Pt has slept most of the shift today. VSS. Wound care done per orders. Colostomy appliance changed. No further requests at this time. Will continue with plan of care.
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
## 2023-03-12 LAB
ANION GAP SERPL CALCULATED.3IONS-SCNC: 11.7 MMOL/L (ref 5–15)
ANISOCYTOSIS BLD QL: NORMAL
BASOPHILS # BLD AUTO: 0.03 10*3/MM3 (ref 0–0.2)
BASOPHILS NFR BLD AUTO: 0.3 % (ref 0–1.5)
BUN SERPL-MCNC: 45 MG/DL (ref 6–20)
BUN/CREAT SERPL: 45.9 (ref 7–25)
CALCIUM SPEC-SCNC: 9.2 MG/DL (ref 8.6–10.5)
CHLORIDE SERPL-SCNC: 101 MMOL/L (ref 98–107)
CO2 SERPL-SCNC: 21.3 MMOL/L (ref 22–29)
CREAT SERPL-MCNC: 0.98 MG/DL (ref 0.76–1.27)
DEPRECATED RDW RBC AUTO: 48.3 FL (ref 37–54)
EGFRCR SERPLBLD CKD-EPI 2021: 96.9 ML/MIN/1.73
EOSINOPHIL # BLD AUTO: 0.23 10*3/MM3 (ref 0–0.4)
EOSINOPHIL NFR BLD AUTO: 2.3 % (ref 0.3–6.2)
ERYTHROCYTE [DISTWIDTH] IN BLOOD BY AUTOMATED COUNT: 14.5 % (ref 12.3–15.4)
GLUCOSE BLDC GLUCOMTR-MCNC: 182 MG/DL (ref 70–130)
GLUCOSE BLDC GLUCOMTR-MCNC: 190 MG/DL (ref 70–130)
GLUCOSE BLDC GLUCOMTR-MCNC: 206 MG/DL (ref 70–130)
GLUCOSE BLDC GLUCOMTR-MCNC: 216 MG/DL (ref 70–130)
GLUCOSE SERPL-MCNC: 292 MG/DL (ref 65–99)
HCT VFR BLD AUTO: 37.4 % (ref 37.5–51)
HGB BLD-MCNC: 10.8 G/DL (ref 13–17.7)
HYPOCHROMIA BLD QL: NORMAL
IMM GRANULOCYTES # BLD AUTO: 0.04 10*3/MM3 (ref 0–0.05)
IMM GRANULOCYTES NFR BLD AUTO: 0.4 % (ref 0–0.5)
LYMPHOCYTES # BLD AUTO: 1.81 10*3/MM3 (ref 0.7–3.1)
LYMPHOCYTES NFR BLD AUTO: 18.5 % (ref 19.6–45.3)
MCH RBC QN AUTO: 26.3 PG (ref 26.6–33)
MCHC RBC AUTO-ENTMCNC: 28.9 G/DL (ref 31.5–35.7)
MCV RBC AUTO: 91.2 FL (ref 79–97)
MONOCYTES # BLD AUTO: 0.36 10*3/MM3 (ref 0.1–0.9)
MONOCYTES NFR BLD AUTO: 3.7 % (ref 5–12)
NEUTROPHILS NFR BLD AUTO: 7.33 10*3/MM3 (ref 1.7–7)
NEUTROPHILS NFR BLD AUTO: 74.8 % (ref 42.7–76)
NRBC BLD AUTO-RTO: 0 /100 WBC (ref 0–0.2)
PLAT MORPH BLD: NORMAL
PLATELET # BLD AUTO: 261 10*3/MM3 (ref 140–450)
PMV BLD AUTO: 9.4 FL (ref 6–12)
POLYCHROMASIA BLD QL SMEAR: NORMAL
POTASSIUM SERPL-SCNC: 4.2 MMOL/L (ref 3.5–5.2)
RBC # BLD AUTO: 4.1 10*6/MM3 (ref 4.14–5.8)
SODIUM SERPL-SCNC: 134 MMOL/L (ref 136–145)
WBC NRBC COR # BLD: 9.8 10*3/MM3 (ref 3.4–10.8)

## 2023-03-12 PROCEDURE — 25010000002 CEFTRIAXONE PER 250 MG: Performed by: STUDENT IN AN ORGANIZED HEALTH CARE EDUCATION/TRAINING PROGRAM

## 2023-03-12 PROCEDURE — 99308 SBSQ NF CARE LOW MDM 20: CPT | Performed by: PHYSICIAN ASSISTANT

## 2023-03-12 PROCEDURE — 85025 COMPLETE CBC W/AUTO DIFF WBC: CPT | Performed by: INTERNAL MEDICINE

## 2023-03-12 PROCEDURE — 63710000001 INSULIN DETEMIR PER 5 UNITS: Performed by: INTERNAL MEDICINE

## 2023-03-12 PROCEDURE — 85007 BL SMEAR W/DIFF WBC COUNT: CPT | Performed by: INTERNAL MEDICINE

## 2023-03-12 PROCEDURE — 80048 BASIC METABOLIC PNL TOTAL CA: CPT | Performed by: INTERNAL MEDICINE

## 2023-03-12 PROCEDURE — 82962 GLUCOSE BLOOD TEST: CPT

## 2023-03-12 PROCEDURE — 63710000001 INSULIN ASPART PER 5 UNITS: Performed by: INTERNAL MEDICINE

## 2023-03-12 RX ADMIN — BUMETANIDE 2 MG: 1 TABLET ORAL at 08:26

## 2023-03-12 RX ADMIN — GLIPIZIDE 10 MG: 5 TABLET ORAL at 08:26

## 2023-03-12 RX ADMIN — DULOXETINE HYDROCHLORIDE 60 MG: 60 CAPSULE, DELAYED RELEASE ORAL at 20:43

## 2023-03-12 RX ADMIN — SUCRALFATE 1 G: 1 TABLET ORAL at 08:27

## 2023-03-12 RX ADMIN — INSULIN ASPART 8 UNITS: 100 INJECTION, SOLUTION INTRAVENOUS; SUBCUTANEOUS at 08:28

## 2023-03-12 RX ADMIN — VITAMINS A AND D OINTMENT 1 APPLICATION: 15.5; 53.4 OINTMENT TOPICAL at 08:29

## 2023-03-12 RX ADMIN — PANTOPRAZOLE SODIUM 40 MG: 40 TABLET, DELAYED RELEASE ORAL at 08:26

## 2023-03-12 RX ADMIN — CARVEDILOL 12.5 MG: 6.25 TABLET, FILM COATED ORAL at 17:00

## 2023-03-12 RX ADMIN — METFORMIN HYDROCHLORIDE 1000 MG: 500 TABLET ORAL at 17:00

## 2023-03-12 RX ADMIN — BACLOFEN 30 MG: 10 TABLET ORAL at 11:55

## 2023-03-12 RX ADMIN — Medication 1000 UNITS: at 08:26

## 2023-03-12 RX ADMIN — SACUBITRIL AND VALSARTAN 1 TABLET: 24; 26 TABLET, FILM COATED ORAL at 08:27

## 2023-03-12 RX ADMIN — Medication 1 CAPSULE: at 08:26

## 2023-03-12 RX ADMIN — GABAPENTIN 800 MG: 400 CAPSULE ORAL at 16:54

## 2023-03-12 RX ADMIN — CEFTRIAXONE 2 G: 2 INJECTION, POWDER, FOR SOLUTION INTRAMUSCULAR; INTRAVENOUS at 08:25

## 2023-03-12 RX ADMIN — GABAPENTIN 800 MG: 400 CAPSULE ORAL at 08:25

## 2023-03-12 RX ADMIN — INSULIN ASPART 40 UNITS: 100 INJECTION, SOLUTION INTRAVENOUS; SUBCUTANEOUS at 17:00

## 2023-03-12 RX ADMIN — METFORMIN HYDROCHLORIDE 1000 MG: 500 TABLET ORAL at 08:26

## 2023-03-12 RX ADMIN — DULOXETINE HYDROCHLORIDE 60 MG: 60 CAPSULE, DELAYED RELEASE ORAL at 08:27

## 2023-03-12 RX ADMIN — APIXABAN 5 MG: 5 TABLET, FILM COATED ORAL at 20:43

## 2023-03-12 RX ADMIN — GABAPENTIN 800 MG: 400 CAPSULE ORAL at 20:43

## 2023-03-12 RX ADMIN — SPIRONOLACTONE 25 MG: 25 TABLET ORAL at 08:27

## 2023-03-12 RX ADMIN — INSULIN ASPART 40 UNITS: 100 INJECTION, SOLUTION INTRAVENOUS; SUBCUTANEOUS at 08:29

## 2023-03-12 RX ADMIN — MODAFINIL 200 MG: 100 TABLET ORAL at 08:25

## 2023-03-12 RX ADMIN — NYSTATIN: 100000 POWDER TOPICAL at 20:44

## 2023-03-12 RX ADMIN — EMPAGLIFLOZIN 10 MG: 10 TABLET, FILM COATED ORAL at 08:28

## 2023-03-12 RX ADMIN — PANTOPRAZOLE SODIUM 40 MG: 40 TABLET, DELAYED RELEASE ORAL at 20:43

## 2023-03-12 RX ADMIN — CARVEDILOL 12.5 MG: 6.25 TABLET, FILM COATED ORAL at 08:27

## 2023-03-12 RX ADMIN — OXYCODONE HYDROCHLORIDE AND ACETAMINOPHEN 500 MG: 500 TABLET ORAL at 08:28

## 2023-03-12 RX ADMIN — ATORVASTATIN CALCIUM 10 MG: 10 TABLET, FILM COATED ORAL at 20:43

## 2023-03-12 RX ADMIN — BACLOFEN 30 MG: 10 TABLET ORAL at 20:43

## 2023-03-12 RX ADMIN — INSULIN DETEMIR 70 UNITS: 100 INJECTION, SOLUTION SUBCUTANEOUS at 20:42

## 2023-03-12 RX ADMIN — BACLOFEN 30 MG: 10 TABLET ORAL at 08:27

## 2023-03-12 RX ADMIN — Medication 1 TABLET: at 08:26

## 2023-03-12 RX ADMIN — NYSTATIN: 100000 POWDER TOPICAL at 08:30

## 2023-03-12 RX ADMIN — VITAMINS A AND D OINTMENT 1 APPLICATION: 15.5; 53.4 OINTMENT TOPICAL at 20:43

## 2023-03-12 RX ADMIN — Medication 10 ML: at 20:44

## 2023-03-12 RX ADMIN — FERROUS SULFATE TAB 325 MG (65 MG ELEMENTAL FE) 325 MG: 325 (65 FE) TAB at 08:26

## 2023-03-12 RX ADMIN — ARIPIPRAZOLE 10 MG: 10 TABLET ORAL at 20:43

## 2023-03-12 RX ADMIN — INSULIN DETEMIR 70 UNITS: 100 INJECTION, SOLUTION SUBCUTANEOUS at 08:29

## 2023-03-12 RX ADMIN — BACLOFEN 30 MG: 10 TABLET ORAL at 17:00

## 2023-03-12 RX ADMIN — INSULIN ASPART 4 UNITS: 100 INJECTION, SOLUTION INTRAVENOUS; SUBCUTANEOUS at 16:54

## 2023-03-12 RX ADMIN — GLIPIZIDE 10 MG: 5 TABLET ORAL at 16:56

## 2023-03-12 RX ADMIN — INSULIN ASPART 40 UNITS: 100 INJECTION, SOLUTION INTRAVENOUS; SUBCUTANEOUS at 11:55

## 2023-03-12 RX ADMIN — DICYCLOMINE HYDROCHLORIDE 10 MG: 10 CAPSULE ORAL at 20:43

## 2023-03-12 RX ADMIN — MAGNESIUM GLUCONATE 500 MG ORAL TABLET 1200 MG: 500 TABLET ORAL at 08:28

## 2023-03-12 RX ADMIN — SACUBITRIL AND VALSARTAN 1 TABLET: 24; 26 TABLET, FILM COATED ORAL at 20:43

## 2023-03-12 RX ADMIN — Medication 10 ML: at 08:30

## 2023-03-12 RX ADMIN — DICYCLOMINE HYDROCHLORIDE 10 MG: 10 CAPSULE ORAL at 08:28

## 2023-03-12 RX ADMIN — INSULIN ASPART 8 UNITS: 100 INJECTION, SOLUTION INTRAVENOUS; SUBCUTANEOUS at 11:54

## 2023-03-12 RX ADMIN — APIXABAN 5 MG: 5 TABLET, FILM COATED ORAL at 08:26

## 2023-03-12 NOTE — PLAN OF CARE
Goal Outcome Evaluation:               Pt has rested in bed this shift. Wound care completed per order and wd vac maintained. Home CPAP machine used over night. No c/o or s/sx of distress, VSS will continue POC
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
## 2023-03-12 NOTE — PROGRESS NOTES
PROGRESS NOTE         Patient Identification:  Name:  Ken Krueger  Age:  47 y.o.  Sex:  male  :  1975  MRN:  [id number]  Visit Number:  [id number]  Primary Care Provider:  Franchesca Hodges APRN         LOS: 19 days       ----------------------------------------------------------------------------------------------------------------------  Subjective       Chief Complaints:    No chief complaint on file.        Interval History:      Patient is sitting up in bed on room air no apparent distress.  Nurses at bedside.  Patient seems to be in good spirits today and denies any new complaints at this time.  Lungs are clear to auscultation bilaterally.  Abdomen is distended, but soft, nontender, with normal bowel sounds.  No edema to right lower extremity.  Wound VAC in place.  Afebrile, no increased output from ostomy.  WBC remains normal at 9.80.    Review of Systems:    Constitutional: no fever, chills and night sweats.  No fatigue.  Eyes: no eye drainage, itching or redness.  HEENT: no mouth sores, dysphagia or nose bleed.  Respiratory: no for shortness of breath, cough, or production of sputum.  Cardiovascular: no chest pain, no palpitations, no orthopnea.  Gastrointestinal: no nausea, vomiting or diarrhea. No abdominal pain, hematemesis or rectal bleeding.  Genitourinary: no dysuria or polyuria.  Hematologic/lymphatic: no lymph node abnormalities, no easy bruising or easy bleeding.  Musculoskeletal: No muscle or joint pain.  Skin: No rash and no itching.  Decubitus ulcers.  Neurological: no loss of consciousness, no seizure, no headache.  Behavioral/Psych: no depression or suicidal ideation.  Endocrine: no hot flashes.  Immunologic: negative.    ----------------------------------------------------------------------------------------------------------------------      Objective       Current Hospital Meds:    Pharmacy Consult - Pharmacy to dose,        ----------------------------------------------------------------------------------------------------------------------    Vital Signs:  Temp:  [97.7 °F (36.5 °C)-98.6 °F (37 °C)] 98.6 °F (37 °C)  Heart Rate:  [79-81] 79  Resp:  [18] 18  BP: (111-130)/(56-73) 111/56  No data found.  SpO2 Percentage    03/10/23 0644 03/10/23 1838 03/11/23 1853   SpO2: 93% 96% 94%     SpO2:  [94 %] 94 %  on   ;   Device (Oxygen Therapy): room air    Body mass index is 42.34 kg/m².  Wt Readings from Last 3 Encounters:   02/21/23 (!) 138 kg (303 lb 9.2 oz)   02/20/23 (!) 139 kg (306 lb 14.1 oz)   12/13/22 (!) 141 kg (310 lb 14.4 oz)        Intake/Output Summary (Last 24 hours) at 3/12/2023 1027  Last data filed at 3/12/2023 0454  Gross per 24 hour   Intake 1401 ml   Output 3725 ml   Net -2324 ml     Diet: Diabetic Diets; Consistent Carbohydrate; Texture: Regular Texture (IDDSI 7); Fluid Consistency: Thin (IDDSI 0)  ----------------------------------------------------------------------------------------------------------------------      Physical Exam:    Constitutional: Morbidly obese  male is sitting up in bed on room air no apparent distress.  Nurse is at bedside.  HENT:  Head: Normocephalic and atraumatic.  Mouth:  Moist mucous membranes.    Eyes:  Conjunctivae and EOM are normal.  No scleral icterus.  Neck:  Neck supple.  No JVD present.    Cardiovascular:  Normal rate, regular rhythm and normal heart sounds with no murmur. No edema.  Pulmonary/Chest:  No respiratory distress, no wheezes, no crackles, with normal breath sounds and good air movement.  Lungs clear to auscultation bilaterally  Abdominal:  Morbidly obese.  Soft.  Bowel sounds are normal.  No distension and no tenderness.  Ileal conduit in place.  Colostomy bag in place with soft stool.  Musculoskeletal:  No tenderness.  No swelling or redness of joints.  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 PICC to right upper arm with no signs of infection.  Neurological:  Alert and  oriented to person, place, and time.  No facial droop.  No slurred speech.   Skin:  Stage IV sacral decubitus wound.  Wound VAC in place.  Psychiatric:  Normal mood and affect.  Behavior is normal.      ----------------------------------------------------------------------------------------------------------------------            Results from last 7 days   Lab Units 03/12/23  0506 03/10/23  0130 03/09/23  0208   WBC 10*3/mm3 9.80 10.17 11.73*   HEMOGLOBIN g/dL 10.8* 10.8* 11.4*   HEMATOCRIT % 37.4* 37.4* 38.6   MCV fL 91.2 89.5 89.4   MCHC g/dL 28.9* 28.9* 29.5*   PLATELETS 10*3/mm3 261 281 303     Results from last 7 days   Lab Units 03/12/23  0506 03/09/23  0208 03/07/23  0322   SODIUM mmol/L 134* 136 138   POTASSIUM mmol/L 4.2 4.2 4.1   CHLORIDE mmol/L 101 100 103   CO2 mmol/L 21.3* 22.8 23.8   BUN mg/dL 45* 47* 46*   CREATININE mg/dL 0.98 0.95 0.93   CALCIUM mg/dL 9.2 9.7 9.2   GLUCOSE mg/dL 292* 260* 312*   Estimated Creatinine Clearance: 134.6 mL/min (by C-G formula based on SCr of 0.98 mg/dL).  No results found for: AMMONIA    Glucose   Date/Time Value Ref Range Status   03/12/2023 0655 216 (H) 70 - 130 mg/dL Final     Comment:     Meter: [id number] : [id number] CINDY STRONG   03/11/2023 1937 190 (H) 70 - 130 mg/dL Final     Comment:     Meter: [id number] : [id number] SUDHAKAR PORTER   03/11/2023 1636 185 (H) 70 - 130 mg/dL Final     Comment:     Meter: [id number] : [id number] SUNIL VALLADARES   03/11/2023 1101 244 (H) 70 - 130 mg/dL Final     Comment:     Meter: [id number] : [id number] SUNIL VALLADARES   03/11/2023 0656 198 (H) 70 - 130 mg/dL Final     Comment:     Meter: [id number] : [id number] Maria Isabel Gorman   03/10/2023 1917 297 (H) 70 - 130 mg/dL Final     Comment:     Meter: [id number] : [id number] BIRGIT MULLER   03/10/2023 1637 264 (H) 70 - 130 mg/dL Final     Comment:     Meter: [id number] : [id number] Ced HUDSON   03/10/2023 1102 218 (H) 70 - 130 mg/dL Final     Comment:      Meter: [id number] : [id number] Ced HUDSON     Lab Results   Component Value Date    HGBA1C 11.50 (H) 02/13/2023     Lab Results   Component Value Date    TSH 3.780 07/19/2022    FREET4 1.31 06/02/2021       Blood Culture   Date Value Ref Range Status   02/12/2023 No growth at 24 hours  Preliminary   02/12/2023 No growth at 24 hours  Preliminary     No results found for: URINECX  No results found for: WOUNDCX  No results found for: STOOLCX  No results found for: RESPCX  Pain Management Panel       Pain Management Panel Latest Ref Rng & Units 6/2/2021 8/19/2018    AMPHETAMINES SCREEN, URINE Negative Negative Negative    BARBITURATES SCREEN Negative Negative Negative    BENZODIAZEPINE SCREEN, URINE Negative Negative Negative    BUPRENORPHINEUR Negative Negative Negative    COCAINE SCREEN, URINE Negative Negative Negative    METHADONE SCREEN, URINE Negative Negative Negative              ----------------------------------------------------------------------------------------------------------------------  Imaging Results (Last 24 Hours)       ** No results found for the last 24 hours. **            -----------------------------------------------------------------------------------  Pertinent Infectious Disease Results     CT abdomen pelvis with contrast on 2/12/2023 showed there is a new decubitus ulcer just to the right of midline at the level of the coccyx with constellation of imaging findings most characteristic of acute infection/osteomyelitis. Small pocket of air and fluid adjacent to the coccyx measuring 3.7 x 4.1 cm could reflect phlegmon or abscess. Chronic bilateral decubitus ulcers at the level of the ischial tuberosities bilaterally with imaging findings suggestive of chronic infection/osteomyelitis, similar to prior. Hepatic steatosis and hepatomegaly with borderline splenomegaly. Nonobstructing left renal stones. Postoperative changes of left lower quadrant and colostomy and right mid abdominal  ileal conduit formation. Diverticulosis. No diverticulitis or bowel obstruction. COVID-19 and flu A/B PCR on 2/12/2023 was negative.    Status post excisional debridement of sacral ulcer on 2/13/2023 with Dr. Taylor.     Assessment/Plan         ASSESSMENT:    Septic shock with lactic acid greater than 4 on admission  Acute and chronic decubitus ulcerations with osteomyelitis and possible abscess      PLAN:    I examined the patient this morning and he is sitting up in bed on room air no apparent distress.  Nurses at bedside.  Patient seems to be in good spirits today and denies any new complaints at this time.  Lungs are clear to auscultation bilaterally.  Abdomen is distended, but soft, nontender, with normal bowel sounds.  No edema to right lower extremity.  Wound VAC in place.  Afebrile, no increased output from ostomy.  WBC remains normal at 9.80.    Recommend to continue on Rocephin 2 g IV every 24 hours for osteomyelitis through 3/29/2023.  Patient seems stable from an infectious disease standpoint.    Code Status:   Code Status and Medical Interventions:   Ordered at: 02/21/23 1034     Code Status (Patient has no pulse and is not breathing):    CPR (Attempt to Resuscitate)     Medical Interventions (Patient has pulse or is breathing):    Full Support       Christy Caro PA-C  03/12/23  10:27 EDT

## 2023-03-13 LAB
GLUCOSE BLDC GLUCOMTR-MCNC: 124 MG/DL (ref 70–130)
GLUCOSE BLDC GLUCOMTR-MCNC: 166 MG/DL (ref 70–130)
GLUCOSE BLDC GLUCOMTR-MCNC: 201 MG/DL (ref 70–130)
GLUCOSE BLDC GLUCOMTR-MCNC: 250 MG/DL (ref 70–130)

## 2023-03-13 PROCEDURE — 82962 GLUCOSE BLOOD TEST: CPT

## 2023-03-13 PROCEDURE — 25010000002 CEFTRIAXONE PER 250 MG: Performed by: STUDENT IN AN ORGANIZED HEALTH CARE EDUCATION/TRAINING PROGRAM

## 2023-03-13 PROCEDURE — 63710000001 INSULIN ASPART PER 5 UNITS: Performed by: INTERNAL MEDICINE

## 2023-03-13 PROCEDURE — 63710000001 INSULIN DETEMIR PER 5 UNITS: Performed by: INTERNAL MEDICINE

## 2023-03-13 PROCEDURE — 99308 SBSQ NF CARE LOW MDM 20: CPT | Performed by: INTERNAL MEDICINE

## 2023-03-13 RX ADMIN — NYSTATIN: 100000 POWDER TOPICAL at 08:27

## 2023-03-13 RX ADMIN — SPIRONOLACTONE 25 MG: 25 TABLET ORAL at 08:16

## 2023-03-13 RX ADMIN — BACLOFEN 30 MG: 10 TABLET ORAL at 11:56

## 2023-03-13 RX ADMIN — METFORMIN HYDROCHLORIDE 1000 MG: 500 TABLET ORAL at 17:08

## 2023-03-13 RX ADMIN — PANTOPRAZOLE SODIUM 40 MG: 40 TABLET, DELAYED RELEASE ORAL at 08:14

## 2023-03-13 RX ADMIN — GLIPIZIDE 10 MG: 5 TABLET ORAL at 17:08

## 2023-03-13 RX ADMIN — ARIPIPRAZOLE 10 MG: 10 TABLET ORAL at 21:45

## 2023-03-13 RX ADMIN — BACLOFEN 30 MG: 10 TABLET ORAL at 08:16

## 2023-03-13 RX ADMIN — INSULIN ASPART 40 UNITS: 100 INJECTION, SOLUTION INTRAVENOUS; SUBCUTANEOUS at 17:08

## 2023-03-13 RX ADMIN — SUCRALFATE 1 G: 1 TABLET ORAL at 08:11

## 2023-03-13 RX ADMIN — BACLOFEN 30 MG: 10 TABLET ORAL at 21:45

## 2023-03-13 RX ADMIN — DICYCLOMINE HYDROCHLORIDE 10 MG: 10 CAPSULE ORAL at 21:45

## 2023-03-13 RX ADMIN — Medication 1000 UNITS: at 08:16

## 2023-03-13 RX ADMIN — OXYCODONE HYDROCHLORIDE AND ACETAMINOPHEN 500 MG: 500 TABLET ORAL at 08:15

## 2023-03-13 RX ADMIN — MODAFINIL 200 MG: 100 TABLET ORAL at 08:14

## 2023-03-13 RX ADMIN — EMPAGLIFLOZIN 10 MG: 10 TABLET, FILM COATED ORAL at 08:16

## 2023-03-13 RX ADMIN — DULOXETINE HYDROCHLORIDE 60 MG: 60 CAPSULE, DELAYED RELEASE ORAL at 21:45

## 2023-03-13 RX ADMIN — INSULIN DETEMIR 70 UNITS: 100 INJECTION, SOLUTION SUBCUTANEOUS at 21:46

## 2023-03-13 RX ADMIN — GLIPIZIDE 10 MG: 5 TABLET ORAL at 08:14

## 2023-03-13 RX ADMIN — APIXABAN 5 MG: 5 TABLET, FILM COATED ORAL at 08:15

## 2023-03-13 RX ADMIN — Medication 10 ML: at 08:26

## 2023-03-13 RX ADMIN — CEFTRIAXONE 2 G: 2 INJECTION, POWDER, FOR SOLUTION INTRAMUSCULAR; INTRAVENOUS at 08:10

## 2023-03-13 RX ADMIN — Medication 10 ML: at 21:47

## 2023-03-13 RX ADMIN — MAGNESIUM GLUCONATE 500 MG ORAL TABLET 1200 MG: 500 TABLET ORAL at 08:14

## 2023-03-13 RX ADMIN — DULOXETINE HYDROCHLORIDE 60 MG: 60 CAPSULE, DELAYED RELEASE ORAL at 08:13

## 2023-03-13 RX ADMIN — GABAPENTIN 800 MG: 400 CAPSULE ORAL at 21:46

## 2023-03-13 RX ADMIN — PANTOPRAZOLE SODIUM 40 MG: 40 TABLET, DELAYED RELEASE ORAL at 21:45

## 2023-03-13 RX ADMIN — NYSTATIN: 100000 POWDER TOPICAL at 21:46

## 2023-03-13 RX ADMIN — SACUBITRIL AND VALSARTAN 1 TABLET: 24; 26 TABLET, FILM COATED ORAL at 21:45

## 2023-03-13 RX ADMIN — BUMETANIDE 2 MG: 1 TABLET ORAL at 08:14

## 2023-03-13 RX ADMIN — VITAMINS A AND D OINTMENT 1 APPLICATION: 15.5; 53.4 OINTMENT TOPICAL at 21:46

## 2023-03-13 RX ADMIN — ATORVASTATIN CALCIUM 10 MG: 10 TABLET, FILM COATED ORAL at 21:45

## 2023-03-13 RX ADMIN — FERROUS SULFATE TAB 325 MG (65 MG ELEMENTAL FE) 325 MG: 325 (65 FE) TAB at 08:15

## 2023-03-13 RX ADMIN — CARVEDILOL 12.5 MG: 6.25 TABLET, FILM COATED ORAL at 17:06

## 2023-03-13 RX ADMIN — VITAMINS A AND D OINTMENT 1 APPLICATION: 15.5; 53.4 OINTMENT TOPICAL at 08:27

## 2023-03-13 RX ADMIN — INSULIN ASPART 4 UNITS: 100 INJECTION, SOLUTION INTRAVENOUS; SUBCUTANEOUS at 08:25

## 2023-03-13 RX ADMIN — INSULIN ASPART 8 UNITS: 100 INJECTION, SOLUTION INTRAVENOUS; SUBCUTANEOUS at 11:55

## 2023-03-13 RX ADMIN — BACLOFEN 30 MG: 10 TABLET ORAL at 17:08

## 2023-03-13 RX ADMIN — Medication 1 TABLET: at 08:15

## 2023-03-13 RX ADMIN — CARVEDILOL 12.5 MG: 6.25 TABLET, FILM COATED ORAL at 08:14

## 2023-03-13 RX ADMIN — SACUBITRIL AND VALSARTAN 1 TABLET: 24; 26 TABLET, FILM COATED ORAL at 08:15

## 2023-03-13 RX ADMIN — INSULIN ASPART 40 UNITS: 100 INJECTION, SOLUTION INTRAVENOUS; SUBCUTANEOUS at 11:55

## 2023-03-13 RX ADMIN — Medication 1 CAPSULE: at 08:15

## 2023-03-13 RX ADMIN — APIXABAN 5 MG: 5 TABLET, FILM COATED ORAL at 21:45

## 2023-03-13 RX ADMIN — DICYCLOMINE HYDROCHLORIDE 10 MG: 10 CAPSULE ORAL at 08:15

## 2023-03-13 RX ADMIN — INSULIN DETEMIR 70 UNITS: 100 INJECTION, SOLUTION SUBCUTANEOUS at 08:26

## 2023-03-13 RX ADMIN — GABAPENTIN 800 MG: 400 CAPSULE ORAL at 17:08

## 2023-03-13 RX ADMIN — GABAPENTIN 800 MG: 400 CAPSULE ORAL at 08:15

## 2023-03-13 RX ADMIN — INSULIN ASPART 40 UNITS: 100 INJECTION, SOLUTION INTRAVENOUS; SUBCUTANEOUS at 08:25

## 2023-03-13 RX ADMIN — METFORMIN HYDROCHLORIDE 1000 MG: 500 TABLET ORAL at 08:16

## 2023-03-13 NOTE — CASE MANAGEMENT/SOCIAL WORK
Discharge Planning Assessment   [facility]     Patient Name: Ken Krueger  MRN: [id number]  Today's Date: 3/13/2023    Admit Date: 2/21/2023     Discharge Plan     Row Name 03/13/23 1603       Plan    Plan Pt was admitted to swing bed on 2/21/23. Swing bed RN submitted clinical updates today. Pt lives with his mother and brother and he plans to return home at discharge. Pt utilized [facility] prior to admission. [facility] [phone] to be contacted at discharge. Pt has a hospital bed, trapeze bar, patricio lift, and wheelchair via [facility] and an electric wheelchair. SS to follow.              Continued Care and Services - Admitted Since 2/21/2023     Home Medical Care     Service Provider Request Status Selected Services Address Phone Fax Patient Preferred    [facility] Pending - No Request Sent N/A [address] [facility] [id number] [phone] -- --              BRENT Burns

## 2023-03-13 NOTE — PLAN OF CARE
Goal Outcome Evaluation:              Outcome Evaluation: Patient has rested in bed, Bipap at night, Wound Vac in place, no request or complaints at this time, wound care completed per orders,  VSS, Will continue plan of care.

## 2023-03-13 NOTE — PROGRESS NOTES
PROGRESS NOTE         Patient Identification:  Name:  Ken Krueger  Age:  47 y.o.  Sex:  male  :  1975  MRN:  [id number]  Visit Number:  [id number]  Primary Care Provider:  Franchesca Hodges APRN         LOS: 20 days       ----------------------------------------------------------------------------------------------------------------------  Subjective       Chief Complaints:    No chief complaint on file.        Interval History:      Patient is sitting up in bed on room air no apparent distress.  Nurse is at bedside.  Eating breakfast and denies any new issues or complaints at this time.  Nurse reports no new issues at this time.  Lungs are clear to auscultation bilaterally.  Abdomen is distended, but soft, nontender, with normal bowel sounds.  No edema to right lower extremity.  Wound VAC remains in place.  Afebrile with no increased output from ostomy.  No new labs today.    Review of Systems:    Constitutional: no fever, chills and night sweats.  No fatigue.  Eyes: no eye drainage, itching or redness.  HEENT: no mouth sores, dysphagia or nose bleed.  Respiratory: no for shortness of breath, cough, or production of sputum.  Cardiovascular: no chest pain, no palpitations, no orthopnea.  Gastrointestinal: no nausea, vomiting or diarrhea. No abdominal pain, hematemesis or rectal bleeding.  Genitourinary: no dysuria or polyuria.  Hematologic/lymphatic: no lymph node abnormalities, no easy bruising or easy bleeding.  Musculoskeletal: No muscle or joint pain.  Skin: No rash and no itching.  Decubitus ulcers.  Neurological: no loss of consciousness, no seizure, no headache.  Behavioral/Psych: no depression or suicidal ideation.  Endocrine: no hot flashes.  Immunologic: negative.    ----------------------------------------------------------------------------------------------------------------------      Objective       Current Hospital Meds:    Pharmacy Consult - Pharmacy to dose,        ----------------------------------------------------------------------------------------------------------------------    Vital Signs:  Temp:  [97 °F (36.1 °C)-98.1 °F (36.7 °C)] 97 °F (36.1 °C)  Heart Rate:  [84-88] 84  Resp:  [18] 18  BP: (126-131)/(63-77) 126/63  No data found.  SpO2 Percentage    03/10/23 1838 03/11/23 1853 03/12/23 1856   SpO2: 96% 94% 94%     SpO2:  [94 %] 94 %  on   ;   Device (Oxygen Therapy): room air    Body mass index is 42.34 kg/m².  Wt Readings from Last 3 Encounters:   02/21/23 (!) 138 kg (303 lb 9.2 oz)   02/20/23 (!) 139 kg (306 lb 14.1 oz)   12/13/22 (!) 141 kg (310 lb 14.4 oz)        Intake/Output Summary (Last 24 hours) at 3/13/2023 0949  Last data filed at 3/13/2023 0900  Gross per 24 hour   Intake 1660 ml   Output 4050 ml   Net -2390 ml     Diet: Diabetic Diets; Consistent Carbohydrate; Texture: Regular Texture (IDDSI 7); Fluid Consistency: Thin (IDDSI 0)  ----------------------------------------------------------------------------------------------------------------------      Physical Exam:    Constitutional: Morbidly obese  male is sitting up in bed on room air no apparent distress.  Eating breakfast and appears comfortable.  Nurse is at bedside.  HENT:  Head: Normocephalic and atraumatic.  Mouth:  Moist mucous membranes.    Eyes:  Conjunctivae and EOM are normal.  No scleral icterus.  Neck:  Neck supple.  No JVD present.    Cardiovascular:  Normal rate, regular rhythm and normal heart sounds with no murmur. No edema.  Pulmonary/Chest:  No respiratory distress, no wheezes, no crackles, with normal breath sounds and good air movement.  Lungs clear to auscultation bilaterally  Abdominal:  Morbidly obese.  Soft.  Bowel sounds are normal.  No distension and no tenderness.  Ileal conduit in place.  Colostomy bag in place with soft stool.  Musculoskeletal:  No tenderness.  No swelling or redness of joints.  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 PICC to right upper arm with no  signs of infection.  Neurological:  Alert and oriented to person, place, and time.  No facial droop.  No slurred speech.   Skin:  Stage IV sacral decubitus wound.  Wound VAC in place.  Psychiatric:  Normal mood and affect.  Behavior is normal.      ----------------------------------------------------------------------------------------------------------------------            Results from last 7 days   Lab Units 03/12/23  0506 03/10/23  0130 03/09/23  0208   WBC 10*3/mm3 9.80 10.17 11.73*   HEMOGLOBIN g/dL 10.8* 10.8* 11.4*   HEMATOCRIT % 37.4* 37.4* 38.6   MCV fL 91.2 89.5 89.4   MCHC g/dL 28.9* 28.9* 29.5*   PLATELETS 10*3/mm3 261 281 303     Results from last 7 days   Lab Units 03/12/23  0506 03/09/23  0208 03/07/23  0322   SODIUM mmol/L 134* 136 138   POTASSIUM mmol/L 4.2 4.2 4.1   CHLORIDE mmol/L 101 100 103   CO2 mmol/L 21.3* 22.8 23.8   BUN mg/dL 45* 47* 46*   CREATININE mg/dL 0.98 0.95 0.93   CALCIUM mg/dL 9.2 9.7 9.2   GLUCOSE mg/dL 292* 260* 312*   Estimated Creatinine Clearance: 134.6 mL/min (by C-G formula based on SCr of 0.98 mg/dL).  No results found for: AMMONIA    Glucose   Date/Time Value Ref Range Status   03/13/2023 0616 166 (H) 70 - 130 mg/dL Final     Comment:     Meter: [id number] : [id number] MERCEDES [place]   03/12/2023 2041 190 (H) 70 - 130 mg/dL Final     Comment:     Meter: [id number] : [id number] MARY JANE RICE   03/12/2023 1630 182 (H) 70 - 130 mg/dL Final     Comment:     Meter: [id number] : [id number] leti floreseria   03/12/2023 1122 206 (H) 70 - 130 mg/dL Final     Comment:     Meter: [id number] : [id number] leti floreseria   03/12/2023 0655 216 (H) 70 - 130 mg/dL Final     Comment:     Meter: [id number] : [id number] CINDY STRONG   03/11/2023 1937 190 (H) 70 - 130 mg/dL Final     Comment:     Meter: [id number] : [id number] SUDHAKAR PORTER   03/11/2023 1636 185 (H) 70 - 130 mg/dL Final     Comment:     Meter: [id number] : [id number] SUNIL VALLADARES   03/11/2023 1101 244  (H) 70 - 130 mg/dL Final     Comment:     Meter: [id number] : [id number] SUNIL VALLADARES     Lab Results   Component Value Date    HGBA1C 11.50 (H) 02/13/2023     Lab Results   Component Value Date    TSH 3.780 07/19/2022    FREET4 1.31 06/02/2021       Blood Culture   Date Value Ref Range Status   02/12/2023 No growth at 24 hours  Preliminary   02/12/2023 No growth at 24 hours  Preliminary     No results found for: URINECX  No results found for: WOUNDCX  No results found for: STOOLCX  No results found for: RESPCX  Pain Management Panel       Pain Management Panel Latest Ref Rng & Units 6/2/2021 8/19/2018    AMPHETAMINES SCREEN, URINE Negative Negative Negative    BARBITURATES SCREEN Negative Negative Negative    BENZODIAZEPINE SCREEN, URINE Negative Negative Negative    BUPRENORPHINEUR Negative Negative Negative    COCAINE SCREEN, URINE Negative Negative Negative    METHADONE SCREEN, URINE Negative Negative Negative              ----------------------------------------------------------------------------------------------------------------------  Imaging Results (Last 24 Hours)       ** No results found for the last 24 hours. **            -----------------------------------------------------------------------------------  Pertinent Infectious Disease Results     CT abdomen pelvis with contrast on 2/12/2023 showed there is a new decubitus ulcer just to the right of midline at the level of the coccyx with constellation of imaging findings most characteristic of acute infection/osteomyelitis. Small pocket of air and fluid adjacent to the coccyx measuring 3.7 x 4.1 cm could reflect phlegmon or abscess. Chronic bilateral decubitus ulcers at the level of the ischial tuberosities bilaterally with imaging findings suggestive of chronic infection/osteomyelitis, similar to prior. Hepatic steatosis and hepatomegaly with borderline splenomegaly. Nonobstructing left renal stones. Postoperative changes of left lower quadrant  and colostomy and right mid abdominal ileal conduit formation. Diverticulosis. No diverticulitis or bowel obstruction. COVID-19 and flu A/B PCR on 2/12/2023 was negative.    Status post excisional debridement of sacral ulcer on 2/13/2023 with Dr. Taylor.        Assessment/Plan         ASSESSMENT:    Septic shock with lactic acid greater than 4 on admission  Acute and chronic decubitus ulcerations with osteomyelitis and possible abscess      PLAN:    I have seen and examined the patient myself this morning with Christy Caro PA-C and pharmacist Esther and discussed overnight changes with primary RN here are my findings:    The patient is currently sitting up in bed, breathing room air and appears to be without any apparent distress. The nurse is at the bedside, and the patient is currently eating breakfast, denying any new issues or complaints at this time. The nurse has reported no new issues thus far. During the lung examination, the patient's lungs were found to be clear upon auscultation bilaterally.    The patient's abdomen is distended but soft, nontender, and has normal bowel sounds. Additionally, there is no edema observed in the right lower extremity. The wound VAC remains in place, and the patient is afebrile with no increased output from ostomy. No new labs were ordered for today.    We recommend continuing the patient on Rocephin 2 g IV every 24 hours for osteomyelitis through 3/29/2023.      Code Status:   Code Status and Medical Interventions:   Ordered at: 02/21/23 1034     Code Status (Patient has no pulse and is not breathing):    CPR (Attempt to Resuscitate)     Medical Interventions (Patient has pulse or is breathing):    Full Support       Christy Caro PA-C  03/13/23  09:49 EDT    Electronically signed by Christy Caro PA-C, 03/13/23, 9:51 AM EDT.    Electronically signed by Tra Jain MD, 03/13/23, 11:28 AM EDT.

## 2023-03-13 NOTE — PLAN OF CARE
Goal Outcome Evaluation:  Plan of Care Reviewed With: patient        Progress: no change  Outcome Evaluation: Patient rested in bed during shift. Wound care done per orders and wound vac changed per Wound care nurse. Patient has no complaints at this time. Will continue with plan of care.
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
## 2023-03-13 NOTE — NURSING NOTE
Wound vac change. Replaced with 2 pieces of black foam. Finger sweep complete. Granulation noted to the edges. 20% sloth noted to the center of the wound. Malodorous smell noted when Vac removed. Smell went away with cleansing.

## 2023-03-14 LAB
GLUCOSE BLDC GLUCOMTR-MCNC: 159 MG/DL (ref 70–130)
GLUCOSE BLDC GLUCOMTR-MCNC: 185 MG/DL (ref 70–130)
GLUCOSE BLDC GLUCOMTR-MCNC: 237 MG/DL (ref 70–130)
GLUCOSE BLDC GLUCOMTR-MCNC: 240 MG/DL (ref 70–130)
GLUCOSE BLDC GLUCOMTR-MCNC: 261 MG/DL (ref 70–130)

## 2023-03-14 PROCEDURE — 63710000001 INSULIN DETEMIR PER 5 UNITS: Performed by: HOSPITALIST

## 2023-03-14 PROCEDURE — 82962 GLUCOSE BLOOD TEST: CPT

## 2023-03-14 PROCEDURE — 63710000001 INSULIN ASPART PER 5 UNITS: Performed by: INTERNAL MEDICINE

## 2023-03-14 PROCEDURE — 99308 SBSQ NF CARE LOW MDM 20: CPT | Performed by: PHYSICIAN ASSISTANT

## 2023-03-14 PROCEDURE — 25010000002 CEFTRIAXONE PER 250 MG: Performed by: STUDENT IN AN ORGANIZED HEALTH CARE EDUCATION/TRAINING PROGRAM

## 2023-03-14 PROCEDURE — 63710000001 INSULIN ASPART PER 5 UNITS: Performed by: HOSPITALIST

## 2023-03-14 PROCEDURE — 63710000001 INSULIN DETEMIR PER 5 UNITS: Performed by: INTERNAL MEDICINE

## 2023-03-14 RX ORDER — INSULIN ASPART 100 [IU]/ML
1-200 INJECTION, SOLUTION INTRAVENOUS; SUBCUTANEOUS
Status: DISCONTINUED | OUTPATIENT
Start: 2023-03-14 | End: 2023-03-28

## 2023-03-14 RX ORDER — NICOTINE POLACRILEX 4 MG
15 LOZENGE BUCCAL
Status: DISCONTINUED | OUTPATIENT
Start: 2023-03-14 | End: 2023-04-01 | Stop reason: SDUPTHER

## 2023-03-14 RX ORDER — DEXTROSE MONOHYDRATE 25 G/50ML
10-50 INJECTION, SOLUTION INTRAVENOUS
Status: DISCONTINUED | OUTPATIENT
Start: 2023-03-14 | End: 2023-04-04 | Stop reason: HOSPADM

## 2023-03-14 RX ORDER — GLUCAGON 1 MG/ML
1 KIT INJECTION
Status: DISCONTINUED | OUTPATIENT
Start: 2023-03-14 | End: 2023-04-01 | Stop reason: SDUPTHER

## 2023-03-14 RX ORDER — METOLAZONE 2.5 MG/1
2.5 TABLET ORAL DAILY
Status: COMPLETED | OUTPATIENT
Start: 2023-03-14 | End: 2023-03-14

## 2023-03-14 RX ORDER — INSULIN ASPART 100 [IU]/ML
1-200 INJECTION, SOLUTION INTRAVENOUS; SUBCUTANEOUS AS NEEDED
Status: DISCONTINUED | OUTPATIENT
Start: 2023-03-14 | End: 2023-03-29

## 2023-03-14 RX ADMIN — NYSTATIN: 100000 POWDER TOPICAL at 20:53

## 2023-03-14 RX ADMIN — SUCRALFATE 1 G: 1 TABLET ORAL at 09:08

## 2023-03-14 RX ADMIN — MODAFINIL 200 MG: 100 TABLET ORAL at 09:07

## 2023-03-14 RX ADMIN — BACLOFEN 30 MG: 10 TABLET ORAL at 18:38

## 2023-03-14 RX ADMIN — BACLOFEN 30 MG: 10 TABLET ORAL at 20:52

## 2023-03-14 RX ADMIN — INSULIN ASPART 59 UNITS: 100 INJECTION, SOLUTION INTRAVENOUS; SUBCUTANEOUS at 11:57

## 2023-03-14 RX ADMIN — CEFTRIAXONE 2 G: 2 INJECTION, POWDER, FOR SOLUTION INTRAMUSCULAR; INTRAVENOUS at 09:06

## 2023-03-14 RX ADMIN — INSULIN ASPART 4 UNITS: 100 INJECTION, SOLUTION INTRAVENOUS; SUBCUTANEOUS at 09:12

## 2023-03-14 RX ADMIN — EMPAGLIFLOZIN 10 MG: 10 TABLET, FILM COATED ORAL at 09:08

## 2023-03-14 RX ADMIN — GABAPENTIN 800 MG: 400 CAPSULE ORAL at 20:52

## 2023-03-14 RX ADMIN — INSULIN ASPART 40 UNITS: 100 INJECTION, SOLUTION INTRAVENOUS; SUBCUTANEOUS at 09:10

## 2023-03-14 RX ADMIN — OXYCODONE HYDROCHLORIDE AND ACETAMINOPHEN 500 MG: 500 TABLET ORAL at 09:08

## 2023-03-14 RX ADMIN — METFORMIN HYDROCHLORIDE 1000 MG: 500 TABLET ORAL at 18:38

## 2023-03-14 RX ADMIN — ARIPIPRAZOLE 10 MG: 10 TABLET ORAL at 20:52

## 2023-03-14 RX ADMIN — NYSTATIN: 100000 POWDER TOPICAL at 09:11

## 2023-03-14 RX ADMIN — METFORMIN HYDROCHLORIDE 1000 MG: 500 TABLET ORAL at 09:08

## 2023-03-14 RX ADMIN — DICYCLOMINE HYDROCHLORIDE 10 MG: 10 CAPSULE ORAL at 10:04

## 2023-03-14 RX ADMIN — FERROUS SULFATE TAB 325 MG (65 MG ELEMENTAL FE) 325 MG: 325 (65 FE) TAB at 09:08

## 2023-03-14 RX ADMIN — VITAMINS A AND D OINTMENT 1 APPLICATION: 15.5; 53.4 OINTMENT TOPICAL at 20:52

## 2023-03-14 RX ADMIN — INSULIN ASPART 9 UNITS: 100 INJECTION, SOLUTION INTRAVENOUS; SUBCUTANEOUS at 21:28

## 2023-03-14 RX ADMIN — INSULIN ASPART 78 UNITS: 100 INJECTION, SOLUTION INTRAVENOUS; SUBCUTANEOUS at 17:00

## 2023-03-14 RX ADMIN — Medication 10 ML: at 09:07

## 2023-03-14 RX ADMIN — GABAPENTIN 800 MG: 400 CAPSULE ORAL at 09:08

## 2023-03-14 RX ADMIN — SACUBITRIL AND VALSARTAN 1 TABLET: 24; 26 TABLET, FILM COATED ORAL at 09:08

## 2023-03-14 RX ADMIN — DULOXETINE HYDROCHLORIDE 60 MG: 60 CAPSULE, DELAYED RELEASE ORAL at 20:52

## 2023-03-14 RX ADMIN — Medication 1000 UNITS: at 09:08

## 2023-03-14 RX ADMIN — GLIPIZIDE 10 MG: 5 TABLET ORAL at 09:08

## 2023-03-14 RX ADMIN — INSULIN DETEMIR 70 UNITS: 100 INJECTION, SOLUTION SUBCUTANEOUS at 10:04

## 2023-03-14 RX ADMIN — GABAPENTIN 800 MG: 400 CAPSULE ORAL at 17:00

## 2023-03-14 RX ADMIN — APIXABAN 5 MG: 5 TABLET, FILM COATED ORAL at 20:52

## 2023-03-14 RX ADMIN — Medication 1 TABLET: at 09:08

## 2023-03-14 RX ADMIN — SPIRONOLACTONE 25 MG: 25 TABLET ORAL at 09:08

## 2023-03-14 RX ADMIN — VITAMINS A AND D OINTMENT 1 APPLICATION: 15.5; 53.4 OINTMENT TOPICAL at 09:06

## 2023-03-14 RX ADMIN — Medication 1 CAPSULE: at 09:08

## 2023-03-14 RX ADMIN — DULOXETINE HYDROCHLORIDE 60 MG: 60 CAPSULE, DELAYED RELEASE ORAL at 09:08

## 2023-03-14 RX ADMIN — METOLAZONE 2.5 MG: 2.5 TABLET ORAL at 10:04

## 2023-03-14 RX ADMIN — MAGNESIUM GLUCONATE 500 MG ORAL TABLET 1200 MG: 500 TABLET ORAL at 09:08

## 2023-03-14 RX ADMIN — Medication 10 ML: at 20:52

## 2023-03-14 RX ADMIN — BACLOFEN 30 MG: 10 TABLET ORAL at 09:08

## 2023-03-14 RX ADMIN — INSULIN DETEMIR 70 UNITS: 100 INJECTION, SOLUTION SUBCUTANEOUS at 21:29

## 2023-03-14 RX ADMIN — CARVEDILOL 12.5 MG: 6.25 TABLET, FILM COATED ORAL at 09:08

## 2023-03-14 RX ADMIN — BACLOFEN 30 MG: 10 TABLET ORAL at 12:00

## 2023-03-14 RX ADMIN — PANTOPRAZOLE SODIUM 40 MG: 40 TABLET, DELAYED RELEASE ORAL at 09:08

## 2023-03-14 RX ADMIN — DICYCLOMINE HYDROCHLORIDE 10 MG: 10 CAPSULE ORAL at 21:00

## 2023-03-14 RX ADMIN — PANTOPRAZOLE SODIUM 40 MG: 40 TABLET, DELAYED RELEASE ORAL at 20:52

## 2023-03-14 RX ADMIN — ATORVASTATIN CALCIUM 10 MG: 10 TABLET, FILM COATED ORAL at 20:52

## 2023-03-14 RX ADMIN — APIXABAN 5 MG: 5 TABLET, FILM COATED ORAL at 09:08

## 2023-03-14 RX ADMIN — BUMETANIDE 2 MG: 1 TABLET ORAL at 09:07

## 2023-03-14 NOTE — PLAN OF CARE
Goal Outcome Evaluation:  Plan of Care Reviewed With: patient           Outcome Evaluation: Patient is resting in bed, VSS on RA. Wound care completed per orders. No acute changes or complaints.

## 2023-03-14 NOTE — PLAN OF CARE
Goal Outcome Evaluation:              Outcome Evaluation: Patient has rested in bed in bed, wound care completed per order, no complaints or request at this time, Will continue plan of care.

## 2023-03-14 NOTE — PROGRESS NOTES
PROGRESS NOTE         Patient Identification:  Name:  Ken Krueger  Age:  47 y.o.  Sex:  male  :  1975  MRN:  [id number]  Visit Number:  [id number]  Primary Care Provider:  Franchesca Hodges APRN         LOS: 21 days       ----------------------------------------------------------------------------------------------------------------------  Subjective       Chief Complaints:    No chief complaint on file.        Interval History:      The patient is resting comfortably in bed on CPAP in no apparent distress.  Denies any new complaints at this time and reports that he is feeling well.  Lungs are clear to auscultation bilaterally.  Abdomen is distended but soft, nontender, with normal bowel sounds.  No edema.  Wound VAC in place.  Afebrile with no increased output from ostomy.  No new labs today.    Review of Systems:    Constitutional: no fever, chills and night sweats.  No fatigue.  Eyes: no eye drainage, itching or redness.  HEENT: no mouth sores, dysphagia or nose bleed.  Respiratory: no for shortness of breath, cough, or production of sputum.  Cardiovascular: no chest pain, no palpitations, no orthopnea.  Gastrointestinal: no nausea, vomiting or diarrhea. No abdominal pain, hematemesis or rectal bleeding.  Genitourinary: no dysuria or polyuria.  Hematologic/lymphatic: no lymph node abnormalities, no easy bruising or easy bleeding.  Musculoskeletal: No muscle or joint pain.  Skin: No rash and no itching.  Decubitus ulcers.  Neurological: no loss of consciousness, no seizure, no headache.  Behavioral/Psych: no depression or suicidal ideation.  Endocrine: no hot flashes.  Immunologic: negative.    ----------------------------------------------------------------------------------------------------------------------      Objective       Current [facility] Meds:    Pharmacy Consult - Pharmacy to dose,        ----------------------------------------------------------------------------------------------------------------------    Vital Signs:  Temp:  [97.4 °F (36.3 °C)-97.5 °F (36.4 °C)] 97.4 °F (36.3 °C)  Heart Rate:  [82-88] 88  Resp:  [16-18] 16  BP: (113-117)/(63-67) 116/66  Mean Arterial Pressure (Non-Invasive) for the past 24 hrs (Last 3 readings):   Noninvasive MAP (mmHg)   03/13/23 1915 80     SpO2 Percentage    03/11/23 1853 03/12/23 1856 03/13/23 1915   SpO2: 94% 94% 95%     SpO2:  [95 %] 95 %  on   ;   Device (Oxygen Therapy): room air    Body mass index is 42.34 kg/m².  Wt Readings from Last 3 Encounters:   02/21/23 (!) 138 kg (303 lb 9.2 oz)   02/20/23 (!) 139 kg (306 lb 14.1 oz)   12/13/22 (!) 141 kg (310 lb 14.4 oz)        Intake/Output Summary (Last 24 hours) at 3/14/2023 1028  Last data filed at 3/14/2023 0500  Gross per 24 hour   Intake 1320 ml   Output 6350 ml   Net -5030 ml     Diet: Diabetic Diets; Consistent Carbohydrate; Texture: Regular Texture (IDDSI 7); Fluid Consistency: Thin (IDDSI 0)  ----------------------------------------------------------------------------------------------------------------------      Physical Exam:    Constitutional: Morbidly obese  male is resting comfortably in bed on CPAP in no apparent distress.  HENT:  Head: Normocephalic and atraumatic.  Mouth:  Moist mucous membranes.    Eyes:  Conjunctivae and EOM are normal.  No scleral icterus.  Neck:  Neck supple.  No JVD present.    Cardiovascular:  Normal rate, regular rhythm and normal heart sounds with no murmur. No edema.  Pulmonary/Chest:  No respiratory distress, no wheezes, no crackles, with normal breath sounds and good air movement.  Lungs clear to auscultation bilaterally  Abdominal:  Morbidly obese.  Soft.  Bowel sounds are normal.  No distension and no tenderness.  Ileal conduit in place.  Colostomy bag in place with soft stool.  Musculoskeletal:  No tenderness.  No swelling or redness of joints.   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 PICC to right upper arm with no signs of infection.  Neurological:  Alert and oriented to person, place, and time.  No facial droop.  No slurred speech.   Skin:  Stage IV sacral decubitus wound.  Wound VAC in place.  Psychiatric:  Normal mood and affect.  Behavior is normal.      ----------------------------------------------------------------------------------------------------------------------            Results from last 7 days   Lab Units 03/12/23  0506 03/10/23  0130 03/09/23  0208   WBC 10*3/mm3 9.80 10.17 11.73*   HEMOGLOBIN g/dL 10.8* 10.8* 11.4*   HEMATOCRIT % 37.4* 37.4* 38.6   MCV fL 91.2 89.5 89.4   MCHC g/dL 28.9* 28.9* 29.5*   PLATELETS 10*3/mm3 261 281 303     Results from last 7 days   Lab Units 03/12/23  0506 03/09/23  0208   SODIUM mmol/L 134* 136   POTASSIUM mmol/L 4.2 4.2   CHLORIDE mmol/L 101 100   CO2 mmol/L 21.3* 22.8   BUN mg/dL 45* 47*   CREATININE mg/dL 0.98 0.95   CALCIUM mg/dL 9.2 9.7   GLUCOSE mg/dL 292* 260*   Estimated Creatinine Clearance: 134.6 mL/min (by C-G formula based on SCr of 0.98 mg/dL).  No results found for: AMMONIA    Glucose   Date/Time Value Ref Range Status   03/14/2023 0618 185 (H) 70 - 130 mg/dL Final     Comment:     Meter: [id number] : [id number] MERCEDES DELMAR   03/13/2023 2144 250 (H) 70 - 130 mg/dL Final     Comment:     Meter: [id number] : [id number] MARY JANE FUSION   03/13/2023 1645 124 70 - 130 mg/dL Final     Comment:     Meter: [id number] : [id number] Cierra Santino   03/13/2023 1040 201 (H) 70 - 130 mg/dL Final     Comment:     Meter: [id number] : [id number] Cierra Santino   03/13/2023 0616 166 (H) 70 - 130 mg/dL Final     Comment:     Meter: [id number] : [id number] MERCEDES DELMAR   03/12/2023 2041 190 (H) 70 - 130 mg/dL Final     Comment:     Meter: [id number] : [id number] MARY JANE FUSION   03/12/2023 1630 182 (H) 70 - 130 mg/dL Final     Comment:     Meter: [id number] : [id number] leti montoya   03/12/2023 1122  206 (H) 70 - 130 mg/dL Final     Comment:     Meter: [id number] : [id number] leti montoya     Lab Results   Component Value Date    HGBA1C 11.50 (H) 02/13/2023     Lab Results   Component Value Date    TSH 3.780 07/19/2022    FREET4 1.31 06/02/2021       Blood Culture   Date Value Ref Range Status   02/12/2023 No growth at 24 hours  Preliminary   02/12/2023 No growth at 24 hours  Preliminary     No results found for: URINECX  No results found for: WOUNDCX  No results found for: STOOLCX  No results found for: RESPCX  Pain Management Panel       Pain Management Panel Latest Ref Rng & Units 6/2/2021 8/19/2018    AMPHETAMINES SCREEN, URINE Negative Negative Negative    BARBITURATES SCREEN Negative Negative Negative    BENZODIAZEPINE SCREEN, URINE Negative Negative Negative    BUPRENORPHINEUR Negative Negative Negative    COCAINE SCREEN, URINE Negative Negative Negative    METHADONE SCREEN, URINE Negative Negative Negative              ----------------------------------------------------------------------------------------------------------------------  Imaging Results (Last 24 Hours)       ** No results found for the last 24 hours. **            -----------------------------------------------------------------------------------  Pertinent Infectious Disease Results     CT abdomen pelvis with contrast on 2/12/2023 showed there is a new decubitus ulcer just to the right of midline at the level of the coccyx with constellation of imaging findings most characteristic of acute infection/osteomyelitis. Small pocket of air and fluid adjacent to the coccyx measuring 3.7 x 4.1 cm could reflect phlegmon or abscess. Chronic bilateral decubitus ulcers at the level of the ischial tuberosities bilaterally with imaging findings suggestive of chronic infection/osteomyelitis, similar to prior. Hepatic steatosis and hepatomegaly with borderline splenomegaly. Nonobstructing left renal stones. Postoperative changes of left lower  quadrant and colostomy and right mid abdominal ileal conduit formation. Diverticulosis. No diverticulitis or bowel obstruction. COVID-19 and flu A/B PCR on 2/12/2023 was negative.    Status post excisional debridement of sacral ulcer on 2/13/2023 with Dr. Taylor.        Assessment/Plan         ASSESSMENT:    Septic shock with lactic acid greater than 4 on admission  Acute and chronic decubitus ulcerations with osteomyelitis and possible abscess      PLAN:      I examined the patient this morning and he is resting comfortably in bed on CPAP in no apparent distress.  Denies any new complaints at this time and reports that he is feeling well.  Lungs are clear to auscultation bilaterally.  Abdomen is distended but soft, nontender, with normal bowel sounds.  No edema.  Wound VAC in place.  Afebrile with no increased output from ostomy.  No new labs today.    Recommend to continue on Rocephin 2 g IV every 24 hours for osteomyelitis through 3/29/2023.    Code Status:   Code Status and Medical Interventions:   Ordered at: 02/21/23 1034     Code Status (Patient has no pulse and is not breathing):    CPR (Attempt to Resuscitate)     Medical Interventions (Patient has pulse or is breathing):    Full Support       Christy Caro PA-C  03/14/23  10:28 EDT

## 2023-03-15 LAB
GLUCOSE BLDC GLUCOMTR-MCNC: 131 MG/DL (ref 70–130)
GLUCOSE BLDC GLUCOMTR-MCNC: 144 MG/DL (ref 70–130)
GLUCOSE BLDC GLUCOMTR-MCNC: 247 MG/DL (ref 70–130)
GLUCOSE BLDC GLUCOMTR-MCNC: 253 MG/DL (ref 70–130)

## 2023-03-15 PROCEDURE — 63710000001 INSULIN DETEMIR PER 5 UNITS: Performed by: HOSPITALIST

## 2023-03-15 PROCEDURE — 82962 GLUCOSE BLOOD TEST: CPT

## 2023-03-15 PROCEDURE — 25010000002 CEFTRIAXONE PER 250 MG: Performed by: STUDENT IN AN ORGANIZED HEALTH CARE EDUCATION/TRAINING PROGRAM

## 2023-03-15 PROCEDURE — 63710000001 INSULIN ASPART PER 5 UNITS: Performed by: HOSPITALIST

## 2023-03-15 PROCEDURE — 99309 SBSQ NF CARE MODERATE MDM 30: CPT | Performed by: INTERNAL MEDICINE

## 2023-03-15 RX ORDER — SODIUM HYPOCHLORITE 1.25 MG/ML
SOLUTION TOPICAL EVERY 12 HOURS
Status: DISCONTINUED | OUTPATIENT
Start: 2023-03-15 | End: 2023-04-04 | Stop reason: HOSPADM

## 2023-03-15 RX ADMIN — OXYCODONE HYDROCHLORIDE AND ACETAMINOPHEN 500 MG: 500 TABLET ORAL at 08:34

## 2023-03-15 RX ADMIN — CEFTRIAXONE 2 G: 2 INJECTION, POWDER, FOR SOLUTION INTRAMUSCULAR; INTRAVENOUS at 07:44

## 2023-03-15 RX ADMIN — SUCRALFATE 1 G: 1 TABLET ORAL at 08:33

## 2023-03-15 RX ADMIN — DICYCLOMINE HYDROCHLORIDE 10 MG: 10 CAPSULE ORAL at 21:34

## 2023-03-15 RX ADMIN — DAKIN'S SOLUTION 0.125% (QUARTER STRENGTH): 0.12 SOLUTION at 15:32

## 2023-03-15 RX ADMIN — DICYCLOMINE HYDROCHLORIDE 10 MG: 10 CAPSULE ORAL at 08:34

## 2023-03-15 RX ADMIN — SACUBITRIL AND VALSARTAN 1 TABLET: 24; 26 TABLET, FILM COATED ORAL at 08:34

## 2023-03-15 RX ADMIN — VITAMINS A AND D OINTMENT 1 APPLICATION: 15.5; 53.4 OINTMENT TOPICAL at 08:35

## 2023-03-15 RX ADMIN — INSULIN ASPART 42 UNITS: 100 INJECTION, SOLUTION INTRAVENOUS; SUBCUTANEOUS at 16:47

## 2023-03-15 RX ADMIN — FERROUS SULFATE TAB 325 MG (65 MG ELEMENTAL FE) 325 MG: 325 (65 FE) TAB at 08:33

## 2023-03-15 RX ADMIN — Medication 1 CAPSULE: at 08:34

## 2023-03-15 RX ADMIN — APIXABAN 5 MG: 5 TABLET, FILM COATED ORAL at 08:33

## 2023-03-15 RX ADMIN — ARIPIPRAZOLE 10 MG: 10 TABLET ORAL at 21:34

## 2023-03-15 RX ADMIN — PANTOPRAZOLE SODIUM 40 MG: 40 TABLET, DELAYED RELEASE ORAL at 21:34

## 2023-03-15 RX ADMIN — BACLOFEN 30 MG: 10 TABLET ORAL at 08:34

## 2023-03-15 RX ADMIN — Medication 10 ML: at 21:35

## 2023-03-15 RX ADMIN — ATORVASTATIN CALCIUM 10 MG: 10 TABLET, FILM COATED ORAL at 21:34

## 2023-03-15 RX ADMIN — EMPAGLIFLOZIN 10 MG: 10 TABLET, FILM COATED ORAL at 08:34

## 2023-03-15 RX ADMIN — BUMETANIDE 2 MG: 1 TABLET ORAL at 08:34

## 2023-03-15 RX ADMIN — INSULIN ASPART 16 UNITS: 100 INJECTION, SOLUTION INTRAVENOUS; SUBCUTANEOUS at 21:32

## 2023-03-15 RX ADMIN — SPIRONOLACTONE 25 MG: 25 TABLET ORAL at 08:34

## 2023-03-15 RX ADMIN — MODAFINIL 200 MG: 100 TABLET ORAL at 08:34

## 2023-03-15 RX ADMIN — GABAPENTIN 800 MG: 400 CAPSULE ORAL at 08:34

## 2023-03-15 RX ADMIN — APIXABAN 5 MG: 5 TABLET, FILM COATED ORAL at 21:34

## 2023-03-15 RX ADMIN — GABAPENTIN 800 MG: 400 CAPSULE ORAL at 16:47

## 2023-03-15 RX ADMIN — MAGNESIUM GLUCONATE 500 MG ORAL TABLET 1200 MG: 500 TABLET ORAL at 08:34

## 2023-03-15 RX ADMIN — BACLOFEN 30 MG: 10 TABLET ORAL at 21:34

## 2023-03-15 RX ADMIN — METFORMIN HYDROCHLORIDE 1000 MG: 500 TABLET ORAL at 08:34

## 2023-03-15 RX ADMIN — INSULIN DETEMIR 63 UNITS: 100 INJECTION, SOLUTION SUBCUTANEOUS at 08:32

## 2023-03-15 RX ADMIN — METFORMIN HYDROCHLORIDE 1000 MG: 500 TABLET ORAL at 18:31

## 2023-03-15 RX ADMIN — NYSTATIN: 100000 POWDER TOPICAL at 21:35

## 2023-03-15 RX ADMIN — Medication 1 TABLET: at 08:34

## 2023-03-15 RX ADMIN — BACLOFEN 30 MG: 10 TABLET ORAL at 18:31

## 2023-03-15 RX ADMIN — NYSTATIN: 100000 POWDER TOPICAL at 08:35

## 2023-03-15 RX ADMIN — BACLOFEN 30 MG: 10 TABLET ORAL at 11:48

## 2023-03-15 RX ADMIN — CARVEDILOL 12.5 MG: 6.25 TABLET, FILM COATED ORAL at 08:34

## 2023-03-15 RX ADMIN — GABAPENTIN 800 MG: 400 CAPSULE ORAL at 21:32

## 2023-03-15 RX ADMIN — VITAMINS A AND D OINTMENT 1 APPLICATION: 15.5; 53.4 OINTMENT TOPICAL at 21:35

## 2023-03-15 RX ADMIN — INSULIN ASPART 75 UNITS: 100 INJECTION, SOLUTION INTRAVENOUS; SUBCUTANEOUS at 11:47

## 2023-03-15 RX ADMIN — INSULIN ASPART 40 UNITS: 100 INJECTION, SOLUTION INTRAVENOUS; SUBCUTANEOUS at 07:44

## 2023-03-15 RX ADMIN — PANTOPRAZOLE SODIUM 40 MG: 40 TABLET, DELAYED RELEASE ORAL at 08:34

## 2023-03-15 RX ADMIN — INSULIN DETEMIR 63 UNITS: 100 INJECTION, SOLUTION SUBCUTANEOUS at 21:32

## 2023-03-15 RX ADMIN — Medication 10 ML: at 08:35

## 2023-03-15 RX ADMIN — DULOXETINE HYDROCHLORIDE 60 MG: 60 CAPSULE, DELAYED RELEASE ORAL at 08:34

## 2023-03-15 RX ADMIN — Medication 1000 UNITS: at 08:33

## 2023-03-15 RX ADMIN — DULOXETINE HYDROCHLORIDE 60 MG: 60 CAPSULE, DELAYED RELEASE ORAL at 21:34

## 2023-03-15 NOTE — NURSING NOTE
In room with GUANACO Chandra for VAC dressing change.  VAC dressing loose and unsealed.  150cc serosanguinous drainage noted to canister.  Malodorous.  VAC d/c'd .  Will cleanse with Dakin's 1/4 strength and pack with fluffed gauze BID.         03/15/23 1305   Wound 02/13/23 1059 sacral spine Incision   Placement Date/Time: 02/13/23 1059   Present on Hospital Admission: No  Location: (c) sacral spine  Primary Wound Type: (c) Incision   Pressure Injury Stage 4   Dressing Appearance dressing loose;moist drainage   Base yellow;white;moist   Red (%), Wound Tissue Color 50   Yellow (%), Wound Tissue Color 50   Periwound intact;redness   Periwound Temperature warm   Periwound Skin Turgor soft   Edges open   Drainage Characteristics/Odor serosanguineous   Drainage Amount moderate   Wound Output (mL) 150   NPWT (Negative Pressure Wound Therapy) 02/28/23 1654 RT gluteal   Placement Date/Time: 02/28/23 1654   Location: RT gluteal   Therapy Setting vacuum off   Sponges Removed 3

## 2023-03-15 NOTE — PLAN OF CARE
Goal Outcome Evaluation:  Plan of Care Reviewed With: patient           Outcome Evaluation: Patient is resting in bed, VSS on RA. Wound care completed per orders. No acute changes or complaints, will continue POC

## 2023-03-15 NOTE — PLAN OF CARE
Goal Outcome Evaluation:           Progress: no change  Outcome Evaluation: Patient has been resting in bed throughout the shift, wound care has been completed per orders, patient states no complaints or concerns.

## 2023-03-15 NOTE — PROGRESS NOTES
PROGRESS NOTE         Patient Identification:  Name:  Ken Krueger  Age:  47 y.o.  Sex:  male  :  1975  MRN:  [id number]  Visit Number:  [id number]  Primary Care Provider:  Franchesca Hodges APRN         LOS: 22 days       ----------------------------------------------------------------------------------------------------------------------  Subjective       Chief Complaints:    No chief complaint on file.        Interval History:      The patient is resting comfortably in bed on CPAP in no apparent distress.  Asleep during assessment today.  Lungs are clear to auscultation bilaterally.  Abdomen is distended but soft, nontender, with normal bowel sounds.  No edema.  Wound VAC remains in place.  Afebrile. No new labs today.    Review of Systems:    Asleep during assessment.    ----------------------------------------------------------------------------------------------------------------------      Objective       [facility] [place] Meds:    Pharmacy Consult - Pharmacy to dose,       ----------------------------------------------------------------------------------------------------------------------    Vital Signs:  Temp:  [97.3 °F (36.3 °C)-97.9 °F (36.6 °C)] 97.9 °F (36.6 °C)  Heart Rate:  [81-86] 86  Resp:  [16-18] 16  BP: (104-129)/(62-71) 129/71  No data found.  SpO2 Percentage    23 1853 23 1856 23 1915   SpO2: 94% 94% 95%        on   ;   Device (Oxygen Therapy): room air    Body mass index is 42.34 kg/m².  Wt Readings from Last 3 Encounters:   23 (!) 138 kg (303 lb 9.2 oz)   23 (!) 139 kg (306 lb 14.1 oz)   22 (!) 141 kg (310 lb 14.4 oz)        Intake/Output Summary (Last 24 hours) at 3/15/2023 1059  Last data filed at 3/15/2023 0900  Gross per 24 hour   Intake 960 ml   Output 6250 ml   Net -5290 ml     Diet: Diabetic Diets; Consistent Carbohydrate; Texture: Regular Texture (IDDSI 7); Fluid Consistency: Thin (IDDSI  0)  ----------------------------------------------------------------------------------------------------------------------      Physical Exam:    Constitutional: Morbidly obese  male is resting in bed on CPAP in no apparent distress.  Appears comfortable.  HENT:  Head: Normocephalic and atraumatic.  Mouth:  Moist mucous membranes.    Eyes:  Conjunctivae and EOM are normal.  No scleral icterus.  Neck:  Neck supple.  No JVD present.    Cardiovascular:  Normal rate, regular rhythm and normal heart sounds with no murmur. No edema.  Pulmonary/Chest:  No respiratory distress, no wheezes, no crackles, with normal breath sounds and good air movement.  Lungs clear to auscultation bilaterally  Abdominal:  Morbidly obese.  Soft.  Bowel sounds are normal.  No distension and no tenderness.  Ileal conduit in place.  Colostomy bag in place with soft stool.  Musculoskeletal:  No tenderness.  No swelling or redness of joints.  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 PICC to right upper arm with no signs of infection.  Neurological:  Alert and oriented to person, place, and time.  No facial droop.  No slurred speech.   Skin:  Stage IV sacral decubitus wound.  Wound VAC in place.  Psychiatric:  Normal mood and affect.  Behavior is normal.      ----------------------------------------------------------------------------------------------------------------------            Results from last 7 days   Lab Units 03/12/23  0506 03/10/23  0130 03/09/23  0208   WBC 10*3/mm3 9.80 10.17 11.73*   HEMOGLOBIN g/dL 10.8* 10.8* 11.4*   HEMATOCRIT % 37.4* 37.4* 38.6   MCV fL 91.2 89.5 89.4   MCHC g/dL 28.9* 28.9* 29.5*   PLATELETS 10*3/mm3 261 281 303     Results from last 7 days   Lab Units 03/12/23  0506 03/09/23  0208   SODIUM mmol/L 134* 136   POTASSIUM mmol/L 4.2 4.2   CHLORIDE mmol/L 101 100   CO2 mmol/L 21.3* 22.8   BUN mg/dL 45* 47*   CREATININE mg/dL 0.98 0.95   CALCIUM mg/dL 9.2 9.7   GLUCOSE mg/dL 292* 260*   Estimated Creatinine Clearance: 134.6  mL/min (by C-G formula based on SCr of 0.98 mg/dL).  No results found for: AMMONIA    Glucose   Date/Time Value Ref Range Status   03/15/2023 0734 131 (H) 70 - 130 mg/dL Final     Comment:     Meter: [id number] : [id number] BERT SALOMON   03/14/2023 2058 159 (H) 70 - 130 mg/dL Final     Comment:     Meter: [id number] : [id number] karlos arthur   03/14/2023 1645 237 (H) 70 - 130 mg/dL Final     Comment:     Meter: [id number] : [id number] BERT [facility]   03/14/2023 1502 240 (H) 70 - 130 mg/dL Final     Comment:     Meter: [id number] : [id number] BERT [facility]   03/14/2023 1112 261 (H) 70 - 130 mg/dL Final     Comment:     Meter: [id number] : [id number] BERT [facility]   03/14/2023 0618 185 (H) 70 - 130 mg/dL Final     Comment:     Meter: [id number] : [id number] MERCEDES DELMAR   03/13/2023 2144 250 (H) 70 - 130 mg/dL Final     Comment:     Meter: [id number] : [id number] MARY JANE FUSION   03/13/2023 1645 124 70 - 130 mg/dL Final     Comment:     Meter: [id number] : [id number] [place]     Lab Results   Component Value Date    HGBA1C 11.50 (H) 02/13/2023     Lab Results   Component Value Date    TSH 3.780 07/19/2022    FREET4 1.31 06/02/2021       Blood Culture   Date Value Ref Range Status   02/12/2023 No growth at 24 hours  Preliminary   02/12/2023 No growth at 24 hours  Preliminary     No results found for: URINECX  No results found for: WOUNDCX  No results found for: STOOLCX  No results found for: RESPCX  Pain Management Panel       Pain Management Panel Latest Ref Rng & Units 6/2/2021 8/19/2018    AMPHETAMINES SCREEN, URINE Negative Negative Negative    BARBITURATES SCREEN Negative Negative Negative    BENZODIAZEPINE SCREEN, URINE Negative Negative Negative    BUPRENORPHINEUR Negative Negative Negative    COCAINE SCREEN, URINE Negative Negative Negative    METHADONE SCREEN, URINE Negative Negative Negative               ----------------------------------------------------------------------------------------------------------------------  Imaging Results (Last 24 Hours)       ** No results found for the last 24 hours. **            -----------------------------------------------------------------------------------  Pertinent Infectious Disease Results     CT abdomen pelvis with contrast on 2/12/2023 showed there is a new decubitus ulcer just to the right of midline at the level of the coccyx with constellation of imaging findings most characteristic of acute infection/osteomyelitis. Small pocket of air and fluid adjacent to the coccyx measuring 3.7 x 4.1 cm could reflect phlegmon or abscess. Chronic bilateral decubitus ulcers at the level of the ischial tuberosities bilaterally with imaging findings suggestive of chronic infection/osteomyelitis, similar to prior. Hepatic steatosis and hepatomegaly with borderline splenomegaly. Nonobstructing left renal stones. Postoperative changes of left lower quadrant and colostomy and right mid abdominal ileal conduit formation. Diverticulosis. No diverticulitis or bowel obstruction. COVID-19 and flu A/B PCR on 2/12/2023 was negative.    Status post excisional debridement of sacral ulcer on 2/13/2023 with Dr. Taylor.        Assessment/Plan         ASSESSMENT:    Septic shock with lactic acid greater than 4 on admission  Acute and chronic decubitus ulcerations with osteomyelitis and possible abscess      PLAN:    I have seen and examined the patient myself this morning with Christy Caro PA-C and pharmacist Esther and discussed overnight changes with primary RN here are my findings:    The patient is currently resting comfortably in bed and appears to be at ease while using CPAP. During today's assessment, the patient was asleep. Upon auscultation, the lungs are clear bilaterally, and there are no signs of distress. Although the abdomen is distended, it is soft, nontender, and has  normal bowel sounds. There is no evidence of edema. The wound VAC is still in place, and the patient is afebrile. No new lab results were obtained today, but a CRP and CBC have been ordered for tomorrow morning.    To address the patient's osteomyelitis, we recommend continuing the Rocephin 2 g IV every 24 hours through 3/29/2023.      Code Status:   Code Status and Medical Interventions:   Ordered at: 02/21/23 1034     Code Status (Patient has no pulse and is not breathing):    CPR (Attempt to Resuscitate)     Medical Interventions (Patient has pulse or is breathing):    Full Support       Christy Caro PA-C  03/15/23  10:59 EDT    Electronically signed by Christy Caro PA-C, 03/15/23, 11:01 AM EDT.    Electronically signed by Tra Jain MD, 03/15/23, 11:53 AM EDT.

## 2023-03-15 NOTE — CASE MANAGEMENT/SOCIAL WORK
Patient has been approved for additional swing bed days with clinical updates for continued stay review due 3/21/2023. Provider and SS notified via secure chat.

## 2023-03-15 NOTE — CASE MANAGEMENT/SOCIAL WORK
Discharge Planning Assessment   Fabricio     Patient Name: Ken Krueger  MRN: [id number]  Today's Date: 3/15/2023    Admit Date: 2/21/2023    Plan: Pt was admitted to swing bed on 2/21/23. Swing bed RN states pt has been approved for additional swing bed days with clinical updates for continued stay review due 3/21/2023. Pt lives with his mother and brother and he plans to return home at discharge. Pt utilized [facility] prior to admission. [facility] [phone] to be contacted at discharge. Pt has a hospital bed, trapeze bar, patricio lift, and wheelchair via [facility] and an electric wheelchair. SS to follow.     Discharge Plan     Row Name 03/15/23 1128       Plan    Plan Pt was admitted to swing bed on 2/21/23. Swing bed RN states pt has been approved for additional swing bed days with clinical updates for continued stay review due 3/21/2023. Pt lives with his mother and brother and he plans to return home at discharge. Pt utilized [facility] prior to admission. [facility] [phone] to be contacted at discharge. Pt has a hospital bed, trapeze bar, patricio lift, and wheelchair via [facility] and an electric wheelchair. SS to follow.              HUMPHREY Loya
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
## 2023-03-15 NOTE — PROGRESS NOTES
Nutrition Services    Patient Name:  Ken Krueger  YOB: 1975  MRN: [id number]  Admit Date:  2/21/2023    BMI: 42.34  Diet: Regular, Consistent Carb, High protein  Supplement: Thanh BID  Intake: 83% x 12 meals  Fluid: not meeting needs    Encourage fluid intake.      Electronically signed by:  Karin Aceves RD  03/15/23 10:34 EDT

## 2023-03-16 LAB
BASOPHILS # BLD AUTO: 0.03 10*3/MM3 (ref 0–0.2)
BASOPHILS NFR BLD AUTO: 0.3 % (ref 0–1.5)
CRP SERPL-MCNC: 6.43 MG/DL (ref 0–0.5)
DEPRECATED RDW RBC AUTO: 46.8 FL (ref 37–54)
EOSINOPHIL # BLD AUTO: 0.24 10*3/MM3 (ref 0–0.4)
EOSINOPHIL NFR BLD AUTO: 2.2 % (ref 0.3–6.2)
ERYTHROCYTE [DISTWIDTH] IN BLOOD BY AUTOMATED COUNT: 14.4 % (ref 12.3–15.4)
GLUCOSE BLDC GLUCOMTR-MCNC: 172 MG/DL (ref 70–130)
GLUCOSE BLDC GLUCOMTR-MCNC: 183 MG/DL (ref 70–130)
GLUCOSE BLDC GLUCOMTR-MCNC: 186 MG/DL (ref 70–130)
GLUCOSE BLDC GLUCOMTR-MCNC: 196 MG/DL (ref 70–130)
GLUCOSE BLDC GLUCOMTR-MCNC: 345 MG/DL (ref 70–130)
GLUCOSE SERPL-MCNC: 243 MG/DL (ref 65–99)
HCT VFR BLD AUTO: 38.5 % (ref 37.5–51)
HGB BLD-MCNC: 11.3 G/DL (ref 13–17.7)
IMM GRANULOCYTES # BLD AUTO: 0.05 10*3/MM3 (ref 0–0.05)
IMM GRANULOCYTES NFR BLD AUTO: 0.5 % (ref 0–0.5)
LYMPHOCYTES # BLD AUTO: 1.83 10*3/MM3 (ref 0.7–3.1)
LYMPHOCYTES NFR BLD AUTO: 16.9 % (ref 19.6–45.3)
MCH RBC QN AUTO: 26.1 PG (ref 26.6–33)
MCHC RBC AUTO-ENTMCNC: 29.4 G/DL (ref 31.5–35.7)
MCV RBC AUTO: 88.9 FL (ref 79–97)
MONOCYTES # BLD AUTO: 0.44 10*3/MM3 (ref 0.1–0.9)
MONOCYTES NFR BLD AUTO: 4.1 % (ref 5–12)
NEUTROPHILS NFR BLD AUTO: 76 % (ref 42.7–76)
NEUTROPHILS NFR BLD AUTO: 8.25 10*3/MM3 (ref 1.7–7)
NRBC BLD AUTO-RTO: 0 /100 WBC (ref 0–0.2)
PLATELET # BLD AUTO: 291 10*3/MM3 (ref 140–450)
PMV BLD AUTO: 9.7 FL (ref 6–12)
RBC # BLD AUTO: 4.33 10*6/MM3 (ref 4.14–5.8)
WBC NRBC COR # BLD: 10.84 10*3/MM3 (ref 3.4–10.8)

## 2023-03-16 PROCEDURE — 63710000001 INSULIN DETEMIR PER 5 UNITS: Performed by: HOSPITALIST

## 2023-03-16 PROCEDURE — 25010000002 CEFTRIAXONE PER 250 MG: Performed by: STUDENT IN AN ORGANIZED HEALTH CARE EDUCATION/TRAINING PROGRAM

## 2023-03-16 PROCEDURE — 99309 SBSQ NF CARE MODERATE MDM 30: CPT | Performed by: INTERNAL MEDICINE

## 2023-03-16 PROCEDURE — 63710000001 INSULIN ASPART PER 5 UNITS: Performed by: HOSPITALIST

## 2023-03-16 PROCEDURE — 85025 COMPLETE CBC W/AUTO DIFF WBC: CPT | Performed by: PHYSICIAN ASSISTANT

## 2023-03-16 PROCEDURE — 82962 GLUCOSE BLOOD TEST: CPT

## 2023-03-16 PROCEDURE — 82947 ASSAY GLUCOSE BLOOD QUANT: CPT | Performed by: HOSPITALIST

## 2023-03-16 PROCEDURE — 86140 C-REACTIVE PROTEIN: CPT | Performed by: PHYSICIAN ASSISTANT

## 2023-03-16 RX ADMIN — OXYCODONE HYDROCHLORIDE AND ACETAMINOPHEN 500 MG: 500 TABLET ORAL at 08:29

## 2023-03-16 RX ADMIN — GABAPENTIN 800 MG: 400 CAPSULE ORAL at 08:24

## 2023-03-16 RX ADMIN — DAKIN'S SOLUTION 0.125% (QUARTER STRENGTH): 0.12 SOLUTION at 01:57

## 2023-03-16 RX ADMIN — METFORMIN HYDROCHLORIDE 1000 MG: 500 TABLET ORAL at 17:11

## 2023-03-16 RX ADMIN — INSULIN ASPART 60 UNITS: 100 INJECTION, SOLUTION INTRAVENOUS; SUBCUTANEOUS at 12:09

## 2023-03-16 RX ADMIN — Medication 10 ML: at 21:14

## 2023-03-16 RX ADMIN — DAKIN'S SOLUTION 0.125% (QUARTER STRENGTH): 0.12 SOLUTION at 14:46

## 2023-03-16 RX ADMIN — BACLOFEN 30 MG: 10 TABLET ORAL at 22:03

## 2023-03-16 RX ADMIN — VITAMINS A AND D OINTMENT 1 APPLICATION: 15.5; 53.4 OINTMENT TOPICAL at 10:01

## 2023-03-16 RX ADMIN — DULOXETINE HYDROCHLORIDE 60 MG: 60 CAPSULE, DELAYED RELEASE ORAL at 21:12

## 2023-03-16 RX ADMIN — DICYCLOMINE HYDROCHLORIDE 10 MG: 10 CAPSULE ORAL at 08:29

## 2023-03-16 RX ADMIN — BUMETANIDE 2 MG: 1 TABLET ORAL at 08:33

## 2023-03-16 RX ADMIN — ARIPIPRAZOLE 10 MG: 10 TABLET ORAL at 21:12

## 2023-03-16 RX ADMIN — CARVEDILOL 12.5 MG: 6.25 TABLET, FILM COATED ORAL at 08:32

## 2023-03-16 RX ADMIN — EMPAGLIFLOZIN 10 MG: 10 TABLET, FILM COATED ORAL at 08:26

## 2023-03-16 RX ADMIN — PANTOPRAZOLE SODIUM 40 MG: 40 TABLET, DELAYED RELEASE ORAL at 08:27

## 2023-03-16 RX ADMIN — METFORMIN HYDROCHLORIDE 1000 MG: 500 TABLET ORAL at 08:31

## 2023-03-16 RX ADMIN — INSULIN DETEMIR 63 UNITS: 100 INJECTION, SOLUTION SUBCUTANEOUS at 22:05

## 2023-03-16 RX ADMIN — INSULIN ASPART 58 UNITS: 100 INJECTION, SOLUTION INTRAVENOUS; SUBCUTANEOUS at 08:04

## 2023-03-16 RX ADMIN — DICYCLOMINE HYDROCHLORIDE 10 MG: 10 CAPSULE ORAL at 21:13

## 2023-03-16 RX ADMIN — BACLOFEN 30 MG: 10 TABLET ORAL at 08:30

## 2023-03-16 RX ADMIN — CEFTRIAXONE 2 G: 2 INJECTION, POWDER, FOR SOLUTION INTRAMUSCULAR; INTRAVENOUS at 08:05

## 2023-03-16 RX ADMIN — GABAPENTIN 800 MG: 400 CAPSULE ORAL at 17:11

## 2023-03-16 RX ADMIN — INSULIN ASPART 56 UNITS: 100 INJECTION, SOLUTION INTRAVENOUS; SUBCUTANEOUS at 17:10

## 2023-03-16 RX ADMIN — NYSTATIN: 100000 POWDER TOPICAL at 10:01

## 2023-03-16 RX ADMIN — APIXABAN 5 MG: 5 TABLET, FILM COATED ORAL at 08:27

## 2023-03-16 RX ADMIN — MODAFINIL 200 MG: 100 TABLET ORAL at 08:27

## 2023-03-16 RX ADMIN — SUCRALFATE 1 G: 1 TABLET ORAL at 08:29

## 2023-03-16 RX ADMIN — MAGNESIUM GLUCONATE 500 MG ORAL TABLET 1200 MG: 500 TABLET ORAL at 08:28

## 2023-03-16 RX ADMIN — BACLOFEN 30 MG: 10 TABLET ORAL at 17:11

## 2023-03-16 RX ADMIN — INSULIN DETEMIR 63 UNITS: 100 INJECTION, SOLUTION SUBCUTANEOUS at 08:04

## 2023-03-16 RX ADMIN — SACUBITRIL AND VALSARTAN 1 TABLET: 24; 26 TABLET, FILM COATED ORAL at 08:32

## 2023-03-16 RX ADMIN — FERROUS SULFATE TAB 325 MG (65 MG ELEMENTAL FE) 325 MG: 325 (65 FE) TAB at 08:31

## 2023-03-16 RX ADMIN — PANTOPRAZOLE SODIUM 40 MG: 40 TABLET, DELAYED RELEASE ORAL at 21:12

## 2023-03-16 RX ADMIN — NYSTATIN: 100000 POWDER TOPICAL at 21:13

## 2023-03-16 RX ADMIN — VITAMINS A AND D OINTMENT 1 APPLICATION: 15.5; 53.4 OINTMENT TOPICAL at 21:13

## 2023-03-16 RX ADMIN — Medication 1 TABLET: at 08:27

## 2023-03-16 RX ADMIN — INSULIN ASPART 14 UNITS: 100 INJECTION, SOLUTION INTRAVENOUS; SUBCUTANEOUS at 22:04

## 2023-03-16 RX ADMIN — SPIRONOLACTONE 25 MG: 25 TABLET ORAL at 08:31

## 2023-03-16 RX ADMIN — Medication 1000 UNITS: at 08:25

## 2023-03-16 RX ADMIN — GABAPENTIN 800 MG: 400 CAPSULE ORAL at 21:13

## 2023-03-16 RX ADMIN — ATORVASTATIN CALCIUM 10 MG: 10 TABLET, FILM COATED ORAL at 21:12

## 2023-03-16 RX ADMIN — DULOXETINE HYDROCHLORIDE 60 MG: 60 CAPSULE, DELAYED RELEASE ORAL at 08:30

## 2023-03-16 RX ADMIN — BACLOFEN 30 MG: 10 TABLET ORAL at 12:09

## 2023-03-16 RX ADMIN — Medication 1 CAPSULE: at 08:30

## 2023-03-16 RX ADMIN — APIXABAN 5 MG: 5 TABLET, FILM COATED ORAL at 21:13

## 2023-03-16 RX ADMIN — Medication 10 ML: at 10:01

## 2023-03-16 NOTE — PLAN OF CARE
Goal Outcome Evaluation:  Plan of Care Reviewed With: patient           Outcome Evaluation: Patient is resting in bed, VSS on RA. No acute changes or complaints. Wound care completed per orders

## 2023-03-16 NOTE — PLAN OF CARE
Goal Outcome Evaluation:  Plan of Care Reviewed With: patient        Progress: no change  Outcome Evaluation: wound care done. insulin dosing via glucomander. cpap in use. no acute changes. will continue with poc

## 2023-03-16 NOTE — PROGRESS NOTES
PROGRESS NOTE         Patient Identification:  Name:  Ken Krueger  Age:  47 y.o.  Sex:  male  :  1975  MRN:  [id number]  Visit Number:  [id number]  Primary Care Provider:  Franchesca Hodges APRN         LOS: 23 days       ----------------------------------------------------------------------------------------------------------------------  Subjective       Chief Complaints:    No chief complaint on file.        Interval History:      Patient sitting up in bed this morning.  Case discussed with wound care Cathie BLANC.  Reports wound needs debridement feels new odor is likely secondary to necrotic tissue.  Plans for possible debridement tomorrow with deep wound cultures.  Currently removed wound VAC and packing with Dakin's solution.  Patient currently on room air today with no apparent distress.  Lungs clear to auscultation bilaterally.  Abdomen soft, nontender.  Afebrile, no increase in ostomy output.  WBC slightly elevated 10.84.  CRP elevated 6.43.    Review of Systems:    Constitutional: No fever, chills. No night sweats. No appetite change or unexpected weight change. No fatigue.  Eyes: no eye drainage, itching or redness.  HEENT: no mouth sores, dysphagia or nose bleed.  Respiratory: no for shortness of breath, cough or production of sputum.  Cardiovascular: no chest pain, no palpitations, no orthopnea.  Gastrointestinal: no nausea, vomiting or diarrhea. No abdominal pain, hematemesis or rectal bleeding.  Genitourinary: no dysuria or polyuria.  Hematologic/lymphatic: no lymph node abnormalities, no easy bruising or easy bleeding.  Musculoskeletal: no muscle or joint pain.  Skin: No rash and no itching.  Neurological: no loss of consciousness, no seizure, no headache.  Behavioral/Psych: no depression or suicidal ideation.  Endocrine: no hot flashes.  Immunologic:  negative.    ----------------------------------------------------------------------------------------------------------------------      Objective       [facility] Meds:    Pharmacy Consult - Pharmacy to dose,       ----------------------------------------------------------------------------------------------------------------------    Vital Signs:  Temp:  [97.3 °F (36.3 °C)-97.9 °F (36.6 °C)] 97.3 °F (36.3 °C)  Heart Rate:  [79-89] 86  Resp:  [20] 20  BP: (101-120)/(54-73) 104/54  No data found.  SpO2 Percentage    03/11/23 1853 03/12/23 1856 03/13/23 1915   SpO2: 94% 94% 95%        on   ;   Device (Oxygen Therapy): room air;CPAP    Body mass index is 42.34 kg/m².  Wt Readings from Last 3 Encounters:   02/21/23 (!) 138 kg (303 lb 9.2 oz)   02/20/23 (!) 139 kg (306 lb 14.1 oz)   12/13/22 (!) 141 kg (310 lb 14.4 oz)        Intake/Output Summary (Last 24 hours) at 3/16/2023 1230  Last data filed at 3/16/2023 0251  Gross per 24 hour   Intake 480 ml   Output 2950 ml   Net -2470 ml     Diet: Diabetic Diets; Consistent Carbohydrate; Texture: Regular Texture (IDDSI 7); Fluid Consistency: Thin (IDDSI 0)  ----------------------------------------------------------------------------------------------------------------------      Physical Exam:    Constitutional: Morbidly obese  male is resting in bed on room air with no apparent distress.  Appears comfortable.  HENT:  Head: Normocephalic and atraumatic.  Mouth:  Moist mucous membranes.    Eyes:  Conjunctivae and EOM are normal.  No scleral icterus.  Neck:  Neck supple.  No JVD present.    Cardiovascular:  Normal rate, regular rhythm and normal heart sounds with no murmur. No edema.  Pulmonary/Chest:  No respiratory distress, no wheezes, no crackles, with normal breath sounds and good air movement.  Lungs clear to auscultation bilaterally  Abdominal:  Morbidly obese.  Soft.  Bowel sounds are normal.  No distension and no tenderness.  Ileal conduit in place.   Colostomy bag in place with soft stool.  Musculoskeletal:  No tenderness.  No swelling or redness of joints.  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 PICC to right upper arm with no signs of infection.  Neurological:  Alert and oriented to person, place, and time.  No facial droop.  No slurred speech.   Skin:  Stage IV sacral decubitus wound.  Dressing in place, packing with Dakin's solution.  Reports new onset odor.  Psychiatric:  Normal mood and affect.  Behavior is normal.      ----------------------------------------------------------------------------------------------------------------------            Results from last 7 days   Lab Units 03/16/23  0427 03/12/23  0506 03/10/23  0130   CRP mg/dL 6.43*  --   --    WBC 10*3/mm3 10.84* 9.80 10.17   HEMOGLOBIN g/dL 11.3* 10.8* 10.8*   HEMATOCRIT % 38.5 37.4* 37.4*   MCV fL 88.9 91.2 89.5   MCHC g/dL 29.4* 28.9* 28.9*   PLATELETS 10*3/mm3 291 261 281     Results from last 7 days   Lab Units 03/12/23  0506   SODIUM mmol/L 134*   POTASSIUM mmol/L 4.2   CHLORIDE mmol/L 101   CO2 mmol/L 21.3*   BUN mg/dL 45*   CREATININE mg/dL 0.98   CALCIUM mg/dL 9.2   GLUCOSE mg/dL 292*   Estimated Creatinine Clearance: 134.6 mL/min (by C-G formula based on SCr of 0.98 mg/dL).  No results found for: AMMONIA    Glucose   Date/Time Value Ref Range Status   03/16/2023 1203 186 (H) 70 - 130 mg/dL Final     Comment:     Meter: [id number] : [id number] [facility]   03/16/2023 0746 183 (H) 70 - 130 mg/dL Final     Comment:     Meter: [id number] : [id number] [facility]   03/16/2023 0706 172 (H) 70 - 130 mg/dL Final     Comment:     Meter: [id number] : [id number] SHE CASTELLANO   03/15/2023 2111 253 (H) 70 - 130 mg/dL Final     Comment:     Meter: [id number] : [id number] kathy wagner   03/15/2023 1639 144 (H) 70 - 130 mg/dL Final     Comment:     Meter: [id number] : [id number] B[facility]   03/15/2023 1126 247 (H) 70 - 130 mg/dL Final     Comment:     Meter: [id number] :  [id number] [facility]   03/15/2023 0734 131 (H) 70 - 130 mg/dL Final     Comment:     Meter: [id number] : [id number] [facility]   03/14/2023 2058 159 (H) 70 - 130 mg/dL Final     Comment:     Meter: [id number] : [id number] karlos arthur     Lab Results   Component Value Date    HGBA1C 11.50 (H) 02/13/2023     Lab Results   Component Value Date    TSH 3.780 07/19/2022    FREET4 1.31 06/02/2021       Blood Culture   Date Value Ref Range Status   02/12/2023 No growth at 24 hours  Preliminary   02/12/2023 No growth at 24 hours  Preliminary     No results found for: URINECX  No results found for: WOUNDCX  No results found for: STOOLCX  No results found for: RESPCX  Pain Management Panel       Pain Management Panel Latest Ref Rng & Units 6/2/2021 8/19/2018    AMPHETAMINES SCREEN, URINE Negative Negative Negative    BARBITURATES SCREEN Negative Negative Negative    BENZODIAZEPINE SCREEN, URINE Negative Negative Negative    BUPRENORPHINEUR Negative Negative Negative    COCAINE SCREEN, URINE Negative Negative Negative    METHADONE SCREEN, URINE Negative Negative Negative              ----------------------------------------------------------------------------------------------------------------------  Imaging Results (Last 24 Hours)       ** No results found for the last 24 hours. **            -----------------------------------------------------------------------------------  Pertinent Infectious Disease Results     CT abdomen pelvis with contrast on 2/12/2023 showed there is a new decubitus ulcer just to the right of midline at the level of the coccyx with constellation of imaging findings most characteristic of acute infection/osteomyelitis. Small pocket of air and fluid adjacent to the coccyx measuring 3.7 x 4.1 cm could reflect phlegmon or abscess. Chronic bilateral decubitus ulcers at the level of the ischial tuberosities bilaterally with imaging findings suggestive of chronic infection/osteomyelitis, similar  to prior. Hepatic steatosis and hepatomegaly with borderline splenomegaly. Nonobstructing left renal stones. Postoperative changes of left lower quadrant and colostomy and right mid abdominal ileal conduit formation. Diverticulosis. No diverticulitis or bowel obstruction. COVID-19 and flu A/B PCR on 2/12/2023 was negative.    Status post excisional debridement of sacral ulcer on 2/13/2023 with Dr. Taylor.        Assessment/Plan         ASSESSMENT:    Septic shock with lactic acid greater than 4 on admission  Acute and chronic decubitus ulcerations with osteomyelitis and possible abscess      PLAN:    I saw the patient this morning with GUANACO Flores and Esther Vick, pharmacist and got report from primary RN and here are my findings:    Patient sitting up in bed this morning. I had a discussion with the wound care APRN, Cathie Handy, regarding the patient's wound. She believes that the wound needs debridement and the new odor is likely due to necrotic tissue. A plan for possible debridement tomorrow with deep wound cultures has been made. The wound VAC and packing have been removed, and Dakin's solution has been used. The patient is currently on room air with no apparent distress. Lungs are clear bilaterally, abdomen soft and nontender. The patient is afebrile, and there has been no increase in ostomy output. The WBC count is slightly elevated at 10.84, and the CRP is also elevated at 6.43.    For now would recommend ceftriaxone 2 g IV every 24 hours through 3/29/2023 for the treatment of osteomyelitis.      Code Status:   Code Status and Medical Interventions:   Ordered at: 02/21/23 1034     Code Status (Patient has no pulse and is not breathing):    CPR (Attempt to Resuscitate)     Medical Interventions (Patient has pulse or is breathing):    Full Support       GUANACO Flores  03/16/23  12:29 EDT    Electronically signed by GUANACO Flores, 03/16/23, 1:20 PM EDT.      Electronically signed by  Tra Jain MD, 03/16/23, 1:36 PM EDT.

## 2023-03-17 LAB
GLUCOSE BLDC GLUCOMTR-MCNC: 183 MG/DL (ref 70–130)
GLUCOSE BLDC GLUCOMTR-MCNC: 193 MG/DL (ref 70–130)
GLUCOSE BLDC GLUCOMTR-MCNC: 201 MG/DL (ref 70–130)
GLUCOSE BLDC GLUCOMTR-MCNC: 209 MG/DL (ref 70–130)
GLUCOSE BLDC GLUCOMTR-MCNC: 214 MG/DL (ref 70–130)

## 2023-03-17 PROCEDURE — 87070 CULTURE OTHR SPECIMN AEROBIC: CPT | Performed by: NURSE PRACTITIONER

## 2023-03-17 PROCEDURE — 94799 UNLISTED PULMONARY SVC/PX: CPT

## 2023-03-17 PROCEDURE — 87205 SMEAR GRAM STAIN: CPT | Performed by: NURSE PRACTITIONER

## 2023-03-17 PROCEDURE — 82962 GLUCOSE BLOOD TEST: CPT

## 2023-03-17 PROCEDURE — 25010000002 CEFTRIAXONE PER 250 MG: Performed by: STUDENT IN AN ORGANIZED HEALTH CARE EDUCATION/TRAINING PROGRAM

## 2023-03-17 PROCEDURE — 87186 SC STD MICRODIL/AGAR DIL: CPT | Performed by: NURSE PRACTITIONER

## 2023-03-17 PROCEDURE — 63710000001 INSULIN DETEMIR PER 5 UNITS: Performed by: HOSPITALIST

## 2023-03-17 PROCEDURE — 87077 CULTURE AEROBIC IDENTIFY: CPT | Performed by: NURSE PRACTITIONER

## 2023-03-17 PROCEDURE — 63710000001 INSULIN ASPART PER 5 UNITS: Performed by: HOSPITALIST

## 2023-03-17 RX ADMIN — INSULIN DETEMIR 69 UNITS: 100 INJECTION, SOLUTION SUBCUTANEOUS at 08:20

## 2023-03-17 RX ADMIN — ATORVASTATIN CALCIUM 10 MG: 10 TABLET, FILM COATED ORAL at 20:15

## 2023-03-17 RX ADMIN — INSULIN ASPART 65 UNITS: 100 INJECTION, SOLUTION INTRAVENOUS; SUBCUTANEOUS at 17:15

## 2023-03-17 RX ADMIN — Medication 1 CAPSULE: at 09:18

## 2023-03-17 RX ADMIN — EMPAGLIFLOZIN 10 MG: 10 TABLET, FILM COATED ORAL at 09:16

## 2023-03-17 RX ADMIN — Medication 10 ML: at 09:25

## 2023-03-17 RX ADMIN — ARIPIPRAZOLE 10 MG: 10 TABLET ORAL at 20:14

## 2023-03-17 RX ADMIN — CARVEDILOL 12.5 MG: 6.25 TABLET, FILM COATED ORAL at 09:16

## 2023-03-17 RX ADMIN — METFORMIN HYDROCHLORIDE 1000 MG: 500 TABLET ORAL at 09:20

## 2023-03-17 RX ADMIN — PANTOPRAZOLE SODIUM 40 MG: 40 TABLET, DELAYED RELEASE ORAL at 09:15

## 2023-03-17 RX ADMIN — CARVEDILOL 12.5 MG: 6.25 TABLET, FILM COATED ORAL at 17:15

## 2023-03-17 RX ADMIN — MODAFINIL 200 MG: 100 TABLET ORAL at 09:15

## 2023-03-17 RX ADMIN — APIXABAN 5 MG: 5 TABLET, FILM COATED ORAL at 20:15

## 2023-03-17 RX ADMIN — DULOXETINE HYDROCHLORIDE 60 MG: 60 CAPSULE, DELAYED RELEASE ORAL at 09:17

## 2023-03-17 RX ADMIN — Medication 1 TABLET: at 09:21

## 2023-03-17 RX ADMIN — DAKIN'S SOLUTION 0.125% (QUARTER STRENGTH): 0.12 SOLUTION at 01:45

## 2023-03-17 RX ADMIN — APIXABAN 5 MG: 5 TABLET, FILM COATED ORAL at 09:17

## 2023-03-17 RX ADMIN — BACLOFEN 30 MG: 10 TABLET ORAL at 17:15

## 2023-03-17 RX ADMIN — Medication 1000 UNITS: at 09:18

## 2023-03-17 RX ADMIN — CEFTRIAXONE 2 G: 2 INJECTION, POWDER, FOR SOLUTION INTRAMUSCULAR; INTRAVENOUS at 10:12

## 2023-03-17 RX ADMIN — BACLOFEN 30 MG: 10 TABLET ORAL at 09:20

## 2023-03-17 RX ADMIN — GABAPENTIN 800 MG: 400 CAPSULE ORAL at 20:15

## 2023-03-17 RX ADMIN — INSULIN ASPART 64 UNITS: 100 INJECTION, SOLUTION INTRAVENOUS; SUBCUTANEOUS at 08:21

## 2023-03-17 RX ADMIN — DAKIN'S SOLUTION 0.125% (QUARTER STRENGTH): 0.12 SOLUTION at 23:23

## 2023-03-17 RX ADMIN — DAKIN'S SOLUTION 0.125% (QUARTER STRENGTH): 0.12 SOLUTION at 14:59

## 2023-03-17 RX ADMIN — DICYCLOMINE HYDROCHLORIDE 10 MG: 10 CAPSULE ORAL at 09:21

## 2023-03-17 RX ADMIN — FERROUS SULFATE TAB 325 MG (65 MG ELEMENTAL FE) 325 MG: 325 (65 FE) TAB at 09:18

## 2023-03-17 RX ADMIN — DULOXETINE HYDROCHLORIDE 60 MG: 60 CAPSULE, DELAYED RELEASE ORAL at 20:15

## 2023-03-17 RX ADMIN — GABAPENTIN 800 MG: 400 CAPSULE ORAL at 15:00

## 2023-03-17 RX ADMIN — SACUBITRIL AND VALSARTAN 1 TABLET: 24; 26 TABLET, FILM COATED ORAL at 20:15

## 2023-03-17 RX ADMIN — BUMETANIDE 2 MG: 1 TABLET ORAL at 09:15

## 2023-03-17 RX ADMIN — Medication 10 ML: at 20:15

## 2023-03-17 RX ADMIN — DICYCLOMINE HYDROCHLORIDE 10 MG: 10 CAPSULE ORAL at 20:15

## 2023-03-17 RX ADMIN — NYSTATIN: 100000 POWDER TOPICAL at 09:25

## 2023-03-17 RX ADMIN — BACLOFEN 30 MG: 10 TABLET ORAL at 12:05

## 2023-03-17 RX ADMIN — METFORMIN HYDROCHLORIDE 1000 MG: 500 TABLET ORAL at 17:15

## 2023-03-17 RX ADMIN — NYSTATIN: 100000 POWDER TOPICAL at 20:16

## 2023-03-17 RX ADMIN — OXYCODONE HYDROCHLORIDE AND ACETAMINOPHEN 500 MG: 500 TABLET ORAL at 09:20

## 2023-03-17 RX ADMIN — PANTOPRAZOLE SODIUM 40 MG: 40 TABLET, DELAYED RELEASE ORAL at 20:15

## 2023-03-17 RX ADMIN — VITAMINS A AND D OINTMENT 1 APPLICATION: 15.5; 53.4 OINTMENT TOPICAL at 20:16

## 2023-03-17 RX ADMIN — INSULIN ASPART 19 UNITS: 100 INJECTION, SOLUTION INTRAVENOUS; SUBCUTANEOUS at 21:37

## 2023-03-17 RX ADMIN — VITAMINS A AND D OINTMENT 1 APPLICATION: 15.5; 53.4 OINTMENT TOPICAL at 09:25

## 2023-03-17 RX ADMIN — MAGNESIUM GLUCONATE 500 MG ORAL TABLET 1200 MG: 500 TABLET ORAL at 09:17

## 2023-03-17 RX ADMIN — INSULIN ASPART 75 UNITS: 100 INJECTION, SOLUTION INTRAVENOUS; SUBCUTANEOUS at 12:30

## 2023-03-17 RX ADMIN — SACUBITRIL AND VALSARTAN 1 TABLET: 24; 26 TABLET, FILM COATED ORAL at 09:16

## 2023-03-17 RX ADMIN — INSULIN DETEMIR 69 UNITS: 100 INJECTION, SOLUTION SUBCUTANEOUS at 21:36

## 2023-03-17 RX ADMIN — SUCRALFATE 1 G: 1 TABLET ORAL at 09:21

## 2023-03-17 RX ADMIN — BACLOFEN 30 MG: 10 TABLET ORAL at 20:15

## 2023-03-17 RX ADMIN — SPIRONOLACTONE 25 MG: 25 TABLET ORAL at 09:17

## 2023-03-17 RX ADMIN — GABAPENTIN 800 MG: 400 CAPSULE ORAL at 09:32

## 2023-03-17 NOTE — CASE MANAGEMENT/SOCIAL WORK
Discharge Planning Assessment   Fabricio     Patient Name: Ken Krueger  MRN: [id number]  Today's Date: 3/17/2023    Admit Date: 2/21/2023    Plan: Pt was admitted to swing bed on 2/21/23 for IV abx through 4/1/23. Swing bed RN states pt has been approved for additional swing bed days with clinical updates for continued stay review due 3/21/2023. Pt lives with his mother and brother and he plans to return home at discharge. Pt utilized [facility] prior to admission. [facility] [phone] to be contacted at discharge. Pt has a hospital bed, trapeze bar, patricio lift, and wheelchair via [facility] and an electric wheelchair. SS to follow.   Discharge Plan     Row Name 03/17/23 1308       Plan    Plan Pt was admitted to swing bed on 2/21/23 for IV abx through 4/1/23. Swing bed RN states pt has been approved for additional swing bed days with clinical updates for continued stay review due 3/21/2023. Pt lives with his mother and brother and he plans to return home at discharge. Pt utilized [facility] prior to admission. [facility] [phone] to be contacted at discharge. Pt has a hospital bed, trapeze bar, patricio lift, and wheelchair via [facility] and an electric wheelchair. SS to follow.                HUMPHREY Loya

## 2023-03-17 NOTE — PLAN OF CARE
Goal Outcome Evaluation:  Plan of Care Reviewed With: patient        Progress: no change  Outcome Evaluation: wound care complete. glucose checked by lab before medicating due to fluctuating readings on glucometers. md notified. will continue with poc

## 2023-03-17 NOTE — PLAN OF CARE
Goal Outcome Evaluation:  Pt resting in bed. Wound care completed. Swab of wound collected. Urostomy appliance changed. Will continue plan of care.
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
## 2023-03-17 NOTE — PROGRESS NOTES
Antimicrobial Length of Therapy:    Rocephin day 31, to be continued until 3/28.    Thank you,   Esther Vick, Pharm.D.   Pharmacy Resident   3/17/2023  13:18 EDT

## 2023-03-18 LAB
GLUCOSE BLDC GLUCOMTR-MCNC: 249 MG/DL (ref 70–130)
GLUCOSE BLDC GLUCOMTR-MCNC: 252 MG/DL (ref 70–130)
GLUCOSE BLDC GLUCOMTR-MCNC: 304 MG/DL (ref 70–130)
GLUCOSE BLDC GLUCOMTR-MCNC: 390 MG/DL (ref 70–130)

## 2023-03-18 PROCEDURE — 25010000002 CEFTRIAXONE PER 250 MG: Performed by: STUDENT IN AN ORGANIZED HEALTH CARE EDUCATION/TRAINING PROGRAM

## 2023-03-18 PROCEDURE — 63710000001 INSULIN ASPART PER 5 UNITS: Performed by: HOSPITALIST

## 2023-03-18 PROCEDURE — 63710000001 INSULIN DETEMIR PER 5 UNITS: Performed by: HOSPITALIST

## 2023-03-18 PROCEDURE — 82962 GLUCOSE BLOOD TEST: CPT

## 2023-03-18 RX ADMIN — DAKIN'S SOLUTION 0.125% (QUARTER STRENGTH): 0.12 SOLUTION at 14:36

## 2023-03-18 RX ADMIN — DULOXETINE HYDROCHLORIDE 60 MG: 60 CAPSULE, DELAYED RELEASE ORAL at 09:20

## 2023-03-18 RX ADMIN — METFORMIN HYDROCHLORIDE 1000 MG: 500 TABLET ORAL at 18:03

## 2023-03-18 RX ADMIN — BACLOFEN 30 MG: 10 TABLET ORAL at 09:17

## 2023-03-18 RX ADMIN — MODAFINIL 200 MG: 100 TABLET ORAL at 09:29

## 2023-03-18 RX ADMIN — INSULIN ASPART 12 UNITS: 100 INJECTION, SOLUTION INTRAVENOUS; SUBCUTANEOUS at 20:34

## 2023-03-18 RX ADMIN — INSULIN DETEMIR 83 UNITS: 100 INJECTION, SOLUTION SUBCUTANEOUS at 09:31

## 2023-03-18 RX ADMIN — SPIRONOLACTONE 25 MG: 25 TABLET ORAL at 09:19

## 2023-03-18 RX ADMIN — DULOXETINE HYDROCHLORIDE 60 MG: 60 CAPSULE, DELAYED RELEASE ORAL at 20:33

## 2023-03-18 RX ADMIN — DICYCLOMINE HYDROCHLORIDE 10 MG: 10 CAPSULE ORAL at 22:04

## 2023-03-18 RX ADMIN — BACLOFEN 30 MG: 10 TABLET ORAL at 18:03

## 2023-03-18 RX ADMIN — Medication 1 TABLET: at 09:19

## 2023-03-18 RX ADMIN — INSULIN ASPART 46 UNITS: 100 INJECTION, SOLUTION INTRAVENOUS; SUBCUTANEOUS at 12:57

## 2023-03-18 RX ADMIN — MAGNESIUM GLUCONATE 500 MG ORAL TABLET 1200 MG: 500 TABLET ORAL at 09:18

## 2023-03-18 RX ADMIN — OXYCODONE HYDROCHLORIDE AND ACETAMINOPHEN 500 MG: 500 TABLET ORAL at 09:18

## 2023-03-18 RX ADMIN — FERROUS SULFATE TAB 325 MG (65 MG ELEMENTAL FE) 325 MG: 325 (65 FE) TAB at 09:18

## 2023-03-18 RX ADMIN — CARVEDILOL 12.5 MG: 6.25 TABLET, FILM COATED ORAL at 09:18

## 2023-03-18 RX ADMIN — METFORMIN HYDROCHLORIDE 1000 MG: 500 TABLET ORAL at 09:17

## 2023-03-18 RX ADMIN — NYSTATIN: 100000 POWDER TOPICAL at 09:30

## 2023-03-18 RX ADMIN — INSULIN DETEMIR 83 UNITS: 100 INJECTION, SOLUTION SUBCUTANEOUS at 20:33

## 2023-03-18 RX ADMIN — EMPAGLIFLOZIN 10 MG: 10 TABLET, FILM COATED ORAL at 09:19

## 2023-03-18 RX ADMIN — INSULIN ASPART 52 UNITS: 100 INJECTION, SOLUTION INTRAVENOUS; SUBCUTANEOUS at 09:52

## 2023-03-18 RX ADMIN — ARIPIPRAZOLE 10 MG: 10 TABLET ORAL at 20:33

## 2023-03-18 RX ADMIN — VITAMINS A AND D OINTMENT 1 APPLICATION: 15.5; 53.4 OINTMENT TOPICAL at 20:34

## 2023-03-18 RX ADMIN — PANTOPRAZOLE SODIUM 40 MG: 40 TABLET, DELAYED RELEASE ORAL at 20:33

## 2023-03-18 RX ADMIN — BUMETANIDE 2 MG: 1 TABLET ORAL at 09:19

## 2023-03-18 RX ADMIN — SACUBITRIL AND VALSARTAN 1 TABLET: 24; 26 TABLET, FILM COATED ORAL at 20:33

## 2023-03-18 RX ADMIN — CEFTRIAXONE 2 G: 2 INJECTION, POWDER, FOR SOLUTION INTRAMUSCULAR; INTRAVENOUS at 09:23

## 2023-03-18 RX ADMIN — BACLOFEN 30 MG: 10 TABLET ORAL at 20:33

## 2023-03-18 RX ADMIN — SACUBITRIL AND VALSARTAN 1 TABLET: 24; 26 TABLET, FILM COATED ORAL at 09:20

## 2023-03-18 RX ADMIN — APIXABAN 5 MG: 5 TABLET, FILM COATED ORAL at 09:19

## 2023-03-18 RX ADMIN — Medication 1000 UNITS: at 09:18

## 2023-03-18 RX ADMIN — NYSTATIN: 100000 POWDER TOPICAL at 20:34

## 2023-03-18 RX ADMIN — GABAPENTIN 800 MG: 400 CAPSULE ORAL at 09:19

## 2023-03-18 RX ADMIN — GABAPENTIN 800 MG: 400 CAPSULE ORAL at 16:12

## 2023-03-18 RX ADMIN — DICYCLOMINE HYDROCHLORIDE 10 MG: 10 CAPSULE ORAL at 09:17

## 2023-03-18 RX ADMIN — PANTOPRAZOLE SODIUM 40 MG: 40 TABLET, DELAYED RELEASE ORAL at 09:19

## 2023-03-18 RX ADMIN — APIXABAN 5 MG: 5 TABLET, FILM COATED ORAL at 20:33

## 2023-03-18 RX ADMIN — GABAPENTIN 800 MG: 400 CAPSULE ORAL at 20:33

## 2023-03-18 RX ADMIN — SUCRALFATE 1 G: 1 TABLET ORAL at 09:16

## 2023-03-18 RX ADMIN — Medication 10 ML: at 20:35

## 2023-03-18 RX ADMIN — Medication 10 ML: at 09:28

## 2023-03-18 RX ADMIN — ATORVASTATIN CALCIUM 10 MG: 10 TABLET, FILM COATED ORAL at 20:33

## 2023-03-18 RX ADMIN — INSULIN ASPART 66 UNITS: 100 INJECTION, SOLUTION INTRAVENOUS; SUBCUTANEOUS at 18:03

## 2023-03-18 NOTE — PLAN OF CARE
Goal Outcome Evaluation:           Progress: no change  Outcome Evaluation: pt resting in bed, wound care done per orders, no acute changes in pt, will continue plan of care.

## 2023-03-18 NOTE — PLAN OF CARE
Goal Outcome Evaluation:  Plan of Care Reviewed With: patient        Progress: no change  Outcome Evaluation: Pt currenlty resting in bed, wound care done per orders. No acute changes at this time.

## 2023-03-19 LAB
GLUCOSE BLDC GLUCOMTR-MCNC: 114 MG/DL (ref 70–130)
GLUCOSE BLDC GLUCOMTR-MCNC: 175 MG/DL (ref 70–130)
GLUCOSE BLDC GLUCOMTR-MCNC: 184 MG/DL (ref 70–130)
GLUCOSE BLDC GLUCOMTR-MCNC: 190 MG/DL (ref 70–130)
GLUCOSE BLDC GLUCOMTR-MCNC: 191 MG/DL (ref 70–130)

## 2023-03-19 PROCEDURE — 99309 SBSQ NF CARE MODERATE MDM 30: CPT | Performed by: NURSE PRACTITIONER

## 2023-03-19 PROCEDURE — 63710000001 INSULIN DETEMIR PER 5 UNITS: Performed by: HOSPITALIST

## 2023-03-19 PROCEDURE — 25010000002 CEFTRIAXONE PER 250 MG: Performed by: STUDENT IN AN ORGANIZED HEALTH CARE EDUCATION/TRAINING PROGRAM

## 2023-03-19 PROCEDURE — 25010000002 VANCOMYCIN 5 G RECONSTITUTED SOLUTION: Performed by: NURSE PRACTITIONER

## 2023-03-19 PROCEDURE — 63710000001 INSULIN ASPART PER 5 UNITS: Performed by: HOSPITALIST

## 2023-03-19 PROCEDURE — 82962 GLUCOSE BLOOD TEST: CPT

## 2023-03-19 RX ORDER — METOLAZONE 2.5 MG/1
2.5 TABLET ORAL ONCE
Status: COMPLETED | OUTPATIENT
Start: 2023-03-19 | End: 2023-03-19

## 2023-03-19 RX ADMIN — METFORMIN HYDROCHLORIDE 1000 MG: 500 TABLET ORAL at 17:54

## 2023-03-19 RX ADMIN — Medication 10 ML: at 22:00

## 2023-03-19 RX ADMIN — INSULIN ASPART 92 UNITS: 100 INJECTION, SOLUTION INTRAVENOUS; SUBCUTANEOUS at 08:40

## 2023-03-19 RX ADMIN — Medication 1 TABLET: at 08:34

## 2023-03-19 RX ADMIN — CARVEDILOL 12.5 MG: 6.25 TABLET, FILM COATED ORAL at 08:34

## 2023-03-19 RX ADMIN — DAKIN'S SOLUTION 0.125% (QUARTER STRENGTH): 0.12 SOLUTION at 02:42

## 2023-03-19 RX ADMIN — DULOXETINE HYDROCHLORIDE 60 MG: 60 CAPSULE, DELAYED RELEASE ORAL at 08:33

## 2023-03-19 RX ADMIN — VANCOMYCIN HYDROCHLORIDE 1250 MG: 5 INJECTION, POWDER, LYOPHILIZED, FOR SOLUTION INTRAVENOUS at 22:25

## 2023-03-19 RX ADMIN — BACLOFEN 30 MG: 10 TABLET ORAL at 17:54

## 2023-03-19 RX ADMIN — INSULIN ASPART 71 UNITS: 100 INJECTION, SOLUTION INTRAVENOUS; SUBCUTANEOUS at 12:38

## 2023-03-19 RX ADMIN — DICYCLOMINE HYDROCHLORIDE 10 MG: 10 CAPSULE ORAL at 21:59

## 2023-03-19 RX ADMIN — MODAFINIL 200 MG: 100 TABLET ORAL at 08:34

## 2023-03-19 RX ADMIN — SACUBITRIL AND VALSARTAN 1 TABLET: 24; 26 TABLET, FILM COATED ORAL at 08:34

## 2023-03-19 RX ADMIN — Medication 10 ML: at 08:43

## 2023-03-19 RX ADMIN — NYSTATIN: 100000 POWDER TOPICAL at 22:00

## 2023-03-19 RX ADMIN — BACLOFEN 30 MG: 10 TABLET ORAL at 22:01

## 2023-03-19 RX ADMIN — GABAPENTIN 800 MG: 400 CAPSULE ORAL at 21:59

## 2023-03-19 RX ADMIN — SACUBITRIL AND VALSARTAN 1 TABLET: 24; 26 TABLET, FILM COATED ORAL at 21:59

## 2023-03-19 RX ADMIN — METFORMIN HYDROCHLORIDE 1000 MG: 500 TABLET ORAL at 08:31

## 2023-03-19 RX ADMIN — INSULIN DETEMIR 83 UNITS: 100 INJECTION, SOLUTION SUBCUTANEOUS at 08:41

## 2023-03-19 RX ADMIN — INSULIN ASPART 30 UNITS: 100 INJECTION, SOLUTION INTRAVENOUS; SUBCUTANEOUS at 17:55

## 2023-03-19 RX ADMIN — Medication 1000 UNITS: at 08:33

## 2023-03-19 RX ADMIN — GABAPENTIN 800 MG: 400 CAPSULE ORAL at 16:25

## 2023-03-19 RX ADMIN — VANCOMYCIN HYDROCHLORIDE 2500 MG: 5 INJECTION, POWDER, LYOPHILIZED, FOR SOLUTION INTRAVENOUS at 11:24

## 2023-03-19 RX ADMIN — FERROUS SULFATE TAB 325 MG (65 MG ELEMENTAL FE) 325 MG: 325 (65 FE) TAB at 08:31

## 2023-03-19 RX ADMIN — DICYCLOMINE HYDROCHLORIDE 10 MG: 10 CAPSULE ORAL at 08:28

## 2023-03-19 RX ADMIN — APIXABAN 5 MG: 5 TABLET, FILM COATED ORAL at 21:59

## 2023-03-19 RX ADMIN — METOLAZONE 2.5 MG: 2.5 TABLET ORAL at 12:36

## 2023-03-19 RX ADMIN — INSULIN ASPART 16 UNITS: 100 INJECTION, SOLUTION INTRAVENOUS; SUBCUTANEOUS at 22:24

## 2023-03-19 RX ADMIN — SPIRONOLACTONE 25 MG: 25 TABLET ORAL at 08:34

## 2023-03-19 RX ADMIN — SUCRALFATE 1 G: 1 TABLET ORAL at 08:28

## 2023-03-19 RX ADMIN — INSULIN DETEMIR 83 UNITS: 100 INJECTION, SOLUTION SUBCUTANEOUS at 22:24

## 2023-03-19 RX ADMIN — BACLOFEN 30 MG: 10 TABLET ORAL at 08:28

## 2023-03-19 RX ADMIN — DULOXETINE HYDROCHLORIDE 60 MG: 60 CAPSULE, DELAYED RELEASE ORAL at 21:59

## 2023-03-19 RX ADMIN — ARIPIPRAZOLE 10 MG: 10 TABLET ORAL at 21:59

## 2023-03-19 RX ADMIN — GABAPENTIN 800 MG: 400 CAPSULE ORAL at 08:28

## 2023-03-19 RX ADMIN — PANTOPRAZOLE SODIUM 40 MG: 40 TABLET, DELAYED RELEASE ORAL at 21:59

## 2023-03-19 RX ADMIN — EMPAGLIFLOZIN 10 MG: 10 TABLET, FILM COATED ORAL at 08:34

## 2023-03-19 RX ADMIN — DAKIN'S SOLUTION 0.125% (QUARTER STRENGTH): 0.12 SOLUTION at 16:25

## 2023-03-19 RX ADMIN — BACLOFEN 30 MG: 10 TABLET ORAL at 12:36

## 2023-03-19 RX ADMIN — NYSTATIN: 100000 POWDER TOPICAL at 08:43

## 2023-03-19 RX ADMIN — APIXABAN 5 MG: 5 TABLET, FILM COATED ORAL at 08:34

## 2023-03-19 RX ADMIN — ATORVASTATIN CALCIUM 10 MG: 10 TABLET, FILM COATED ORAL at 22:00

## 2023-03-19 RX ADMIN — VITAMINS A AND D OINTMENT 1 APPLICATION: 15.5; 53.4 OINTMENT TOPICAL at 08:43

## 2023-03-19 RX ADMIN — MAGNESIUM GLUCONATE 500 MG ORAL TABLET 1200 MG: 500 TABLET ORAL at 08:33

## 2023-03-19 RX ADMIN — OXYCODONE HYDROCHLORIDE AND ACETAMINOPHEN 500 MG: 500 TABLET ORAL at 08:31

## 2023-03-19 RX ADMIN — PANTOPRAZOLE SODIUM 40 MG: 40 TABLET, DELAYED RELEASE ORAL at 08:34

## 2023-03-19 RX ADMIN — VITAMINS A AND D OINTMENT 1 APPLICATION: 15.5; 53.4 OINTMENT TOPICAL at 22:00

## 2023-03-19 RX ADMIN — BUMETANIDE 2 MG: 1 TABLET ORAL at 08:28

## 2023-03-19 RX ADMIN — Medication 1 CAPSULE: at 08:34

## 2023-03-19 RX ADMIN — CEFTRIAXONE 2 G: 2 INJECTION, POWDER, FOR SOLUTION INTRAMUSCULAR; INTRAVENOUS at 08:36

## 2023-03-19 NOTE — PLAN OF CARE
Goal Outcome Evaluation:           Progress: no change  Outcome Evaluation: pt resting in bed, wound care done per orders, no acute changes in pt, will continue plan of care.   troponin 2.470

## 2023-03-19 NOTE — PLAN OF CARE
Goal Outcome Evaluation:  Plan of Care Reviewed With: patient        Progress: no change  Outcome Evaluation: Pt resting in bed, wound care done per orders. No acute changes at this time.
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
## 2023-03-19 NOTE — PROGRESS NOTES
Kinetics :   Vancomycin   Day 1    The patient has been evaluated for vancomycin for osteomyelitis.     We will load the patient with vancomycin 2500mg and follow with 1250mg q 12 hs to maximize the auc / trough level projections and monitor with you.

## 2023-03-19 NOTE — PROGRESS NOTES
PROGRESS NOTE         Patient Identification:  Name:  Ken Krueger  Age:  47 y.o.  Sex:  male  :  1975  MRN:  [id number]  Visit Number:  [id number]  Primary Care Provider:  Franchesca Hodges APRN         LOS: 26 days       ----------------------------------------------------------------------------------------------------------------------  Subjective       Chief Complaints:    No chief complaint on file.        Interval History:      Patient resting comfortably in bed this morning.  Currently on BiPAP.  No issues reported overnight.  Status post debridement with deep tissue culture with wound care GUANACO Handy on 3/17/2023.  Deep tissue culture from 3/17/2023 updated to report growth of gram-positive cocci.  Afebrile, no increase in ostomy output.  Lungs clear to auscultation bilaterally.  Abdomen soft, nontender.    Review of Systems:    Constitutional: No fever, chills. No night sweats. No appetite change or unexpected weight change. No fatigue.  Eyes: no eye drainage, itching or redness.  HEENT: no mouth sores, dysphagia or nose bleed.  Respiratory: no for shortness of breath, cough or production of sputum.  Cardiovascular: no chest pain, no palpitations, no orthopnea.  Gastrointestinal: no nausea, vomiting or diarrhea. No abdominal pain, hematemesis or rectal bleeding.  Genitourinary: no dysuria or polyuria.  Hematologic/lymphatic: no lymph node abnormalities, no easy bruising or easy bleeding.  Musculoskeletal: no muscle or joint pain.  Skin: No rash and no itching.  Neurological: no loss of consciousness, no seizure, no headache.  Behavioral/Psych: no depression or suicidal ideation.  Endocrine: no hot flashes.  Immunologic: negative.    ----------------------------------------------------------------------------------------------------------------------      Objective       Current [facility] Meds:    Pharmacy Consult - Pharmacy to dose,   Pharmacy Consult - Pharmacy to dose,        ----------------------------------------------------------------------------------------------------------------------    Vital Signs:  Temp:  [98.1 °F (36.7 °C)-98.8 °F (37.1 °C)] 98.1 °F (36.7 °C)  Heart Rate:  [70-84] 84  Resp:  [18] 18  BP: ()/(51-74) 121/59  No data found.  SpO2 Percentage    03/12/23 1856 03/13/23 1915 03/17/23 0736   SpO2: 94% 95% 98%        on   ;   Device (Oxygen Therapy): room air    Body mass index is 42.34 kg/m².  Wt Readings from Last 3 Encounters:   02/21/23 (!) 138 kg (303 lb 9.2 oz)   02/20/23 (!) 139 kg (306 lb 14.1 oz)   12/13/22 (!) 141 kg (310 lb 14.4 oz)        Intake/Output Summary (Last 24 hours) at 3/19/2023 1035  Last data filed at 3/19/2023 0800  Gross per 24 hour   Intake 1470 ml   Output 4050 ml   Net -2580 ml     Diet: Diabetic Diets; Consistent Carbohydrate; Texture: Regular Texture (IDDSI 7); Fluid Consistency: Thin (IDDSI 0)  ----------------------------------------------------------------------------------------------------------------------      Physical Exam:    Constitutional: Morbidly obese  male is resting in bed on BiPAP this morning.  Drowsy.  HENT:  Head: Normocephalic and atraumatic.  Mouth:  Moist mucous membranes.    Eyes:  Conjunctivae and EOM are normal.  No scleral icterus.  Neck:  Neck supple.  No JVD present.    Cardiovascular:  Normal rate, regular rhythm and normal heart sounds with no murmur. No edema.  Pulmonary/Chest:  No respiratory distress, no wheezes, no crackles, with normal breath sounds and good air movement.  Lungs clear to auscultation bilaterally  Abdominal:  Morbidly obese.  Soft.  Bowel sounds are normal.  No distension and no tenderness.  Ileal conduit in place.  Colostomy bag in place with soft stool.  Musculoskeletal:  No tenderness.  No swelling or redness of joints.  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 PICC to right upper arm with no signs of infection.  Neurological:  Alert and oriented to person, place, and time.  No  facial droop.  No slurred speech.   Skin:  Stage IV sacral decubitus wound.  Dressing in place, packing with Dakin's solution.  Reports new onset odor.  Psychiatric:  Normal mood and affect.  Behavior is normal.      ----------------------------------------------------------------------------------------------------------------------            Results from last 7 days   Lab Units 03/16/23  0427   CRP mg/dL 6.43*   WBC 10*3/mm3 10.84*   HEMOGLOBIN g/dL 11.3*   HEMATOCRIT % 38.5   MCV fL 88.9   MCHC g/dL 29.4*   PLATELETS 10*3/mm3 291     Results from last 7 days   Lab Units 03/16/23  2106   GLUCOSE mg/dL 243*   Estimated Creatinine Clearance: 134.6 mL/min (by C-G formula based on SCr of 0.98 mg/dL).  No results found for: AMMONIA    Glucose   Date/Time Value Ref Range Status   03/19/2023 0747 175 (H) 70 - 130 mg/dL Final     Comment:     Meter: [id number] : [id number] CINDY STRONG   03/19/2023 0658 184 (H) 70 - 130 mg/dL Final     Comment:     Meter: [id number] : [id number] CINDY STRONG   03/18/2023 2008 304 (H) 70 - 130 mg/dL Final     Comment:     Meter: [id number] : [id number] Nessa Hernandezs   03/18/2023 1717 252 (H) 70 - 130 mg/dL Final     Comment:     Meter: [id number] : [id number] DARASHAHRZADA GAMBREL   03/18/2023 1219 390 (H) 70 - 130 mg/dL Final     Comment:     Meter: [id number] : [id number] KATEYLISSA GAMBREL   03/18/2023 0822 249 (H) 70 - 130 mg/dL Final     Comment:     Meter: [id number] : [id number] SHE CASTELLANO   03/17/2023 2021 209 (H) 70 - 130 mg/dL Final     Comment:     Meter: [id number] : [id number] ARTUROKWAME JACKSONCORTEZ   03/17/2023 1659 201 (H) 70 - 130 mg/dL Final     Comment:     Meter: [id number] : [id number] JOHN LIANG     Lab Results   Component Value Date    HGBA1C 11.50 (H) 02/13/2023     Lab Results   Component Value Date    TSH 3.780 07/19/2022    FREET4 1.31 06/02/2021       Blood Culture   Date Value Ref Range Status   02/12/2023 No growth at 24 hours   Preliminary   02/12/2023 No growth at 24 hours  Preliminary     No results found for: URINECX  No results found for: WOUNDCX  No results found for: STOOLCX  No results found for: RESPCX  Pain Management Panel     Pain Management Panel Latest Ref Rng & Units 6/2/2021 8/19/2018    AMPHETAMINES SCREEN, URINE Negative Negative Negative    BARBITURATES SCREEN Negative Negative Negative    BENZODIAZEPINE SCREEN, URINE Negative Negative Negative    BUPRENORPHINEUR Negative Negative Negative    COCAINE SCREEN, URINE Negative Negative Negative    METHADONE SCREEN, URINE Negative Negative Negative            ----------------------------------------------------------------------------------------------------------------------  Imaging Results (Last 24 Hours)     ** No results found for the last 24 hours. **          -----------------------------------------------------------------------------------  Pertinent Infectious Disease Results     CT abdomen pelvis with contrast on 2/12/2023 showed there is a new decubitus ulcer just to the right of midline at the level of the coccyx with constellation of imaging findings most characteristic of acute infection/osteomyelitis. Small pocket of air and fluid adjacent to the coccyx measuring 3.7 x 4.1 cm could reflect phlegmon or abscess. Chronic bilateral decubitus ulcers at the level of the ischial tuberosities bilaterally with imaging findings suggestive of chronic infection/osteomyelitis, similar to prior. Hepatic steatosis and hepatomegaly with borderline splenomegaly. Nonobstructing left renal stones. Postoperative changes of left lower quadrant and colostomy and right mid abdominal ileal conduit formation. Diverticulosis. No diverticulitis or bowel obstruction. COVID-19 and flu A/B PCR on 2/12/2023 was negative.    Status post excisional debridement of sacral ulcer on 2/13/2023 with Dr. Taylor.        Assessment/Plan         ASSESSMENT:    Septic shock with lactic acid greater  than 4 on admission  Acute and chronic decubitus ulcerations with osteomyelitis and possible abscess      PLAN:    Patient resting comfortably in bed this morning.  Currently on BiPAP.  No issues reported overnight.  Status post debridement with deep tissue culture with wound care APRN Cathie Handy on 3/17/2023.  Deep tissue culture from 3/17/2023 updated to report growth of gram-positive cocci.  Afebrile, no increase in ostomy output.  Lungs clear to auscultation bilaterally.  Abdomen soft, nontender.    For now would recommend ceftriaxone 2 g IV every 24 hours through 3/29/2023 for the treatment of osteomyelitis.  Today vancomycin pharmacy to dose was initiated to continue with ceftriaxone pending finalization of deep tissue cultures from 3/17/2023.  We will continue to follow closely and adjust antibiotic therapy as needed.      Code Status:   Code Status and Medical Interventions:   Ordered at: 02/21/23 1034     Code Status (Patient has no pulse and is not breathing):    CPR (Attempt to Resuscitate)     Medical Interventions (Patient has pulse or is breathing):    Full Support       GUANACO Flores  03/19/23  10:35 EDT

## 2023-03-20 LAB
ANION GAP SERPL CALCULATED.3IONS-SCNC: 14.2 MMOL/L (ref 5–15)
BASOPHILS # BLD AUTO: 0.04 10*3/MM3 (ref 0–0.2)
BASOPHILS NFR BLD AUTO: 0.4 % (ref 0–1.5)
BUN SERPL-MCNC: 53 MG/DL (ref 6–20)
BUN/CREAT SERPL: 49.5 (ref 7–25)
CALCIUM SPEC-SCNC: 9.5 MG/DL (ref 8.6–10.5)
CHLORIDE SERPL-SCNC: 99 MMOL/L (ref 98–107)
CO2 SERPL-SCNC: 21.8 MMOL/L (ref 22–29)
CREAT SERPL-MCNC: 1.07 MG/DL (ref 0.76–1.27)
DEPRECATED RDW RBC AUTO: 46.6 FL (ref 37–54)
EGFRCR SERPLBLD CKD-EPI 2021: 87.2 ML/MIN/1.73
EOSINOPHIL # BLD AUTO: 0.18 10*3/MM3 (ref 0–0.4)
EOSINOPHIL NFR BLD AUTO: 1.7 % (ref 0.3–6.2)
ERYTHROCYTE [DISTWIDTH] IN BLOOD BY AUTOMATED COUNT: 14.6 % (ref 12.3–15.4)
GLUCOSE BLDC GLUCOMTR-MCNC: 132 MG/DL (ref 70–130)
GLUCOSE BLDC GLUCOMTR-MCNC: 194 MG/DL (ref 70–130)
GLUCOSE BLDC GLUCOMTR-MCNC: 199 MG/DL (ref 70–130)
GLUCOSE BLDC GLUCOMTR-MCNC: 249 MG/DL (ref 70–130)
GLUCOSE SERPL-MCNC: 292 MG/DL (ref 65–99)
HCT VFR BLD AUTO: 38.3 % (ref 37.5–51)
HGB BLD-MCNC: 11.1 G/DL (ref 13–17.7)
IMM GRANULOCYTES # BLD AUTO: 0.04 10*3/MM3 (ref 0–0.05)
IMM GRANULOCYTES NFR BLD AUTO: 0.4 % (ref 0–0.5)
LYMPHOCYTES # BLD AUTO: 1.96 10*3/MM3 (ref 0.7–3.1)
LYMPHOCYTES NFR BLD AUTO: 18.8 % (ref 19.6–45.3)
MCH RBC QN AUTO: 25.9 PG (ref 26.6–33)
MCHC RBC AUTO-ENTMCNC: 29 G/DL (ref 31.5–35.7)
MCV RBC AUTO: 89.3 FL (ref 79–97)
MONOCYTES # BLD AUTO: 0.42 10*3/MM3 (ref 0.1–0.9)
MONOCYTES NFR BLD AUTO: 4 % (ref 5–12)
NEUTROPHILS NFR BLD AUTO: 7.78 10*3/MM3 (ref 1.7–7)
NEUTROPHILS NFR BLD AUTO: 74.7 % (ref 42.7–76)
NRBC BLD AUTO-RTO: 0 /100 WBC (ref 0–0.2)
PLATELET # BLD AUTO: 305 10*3/MM3 (ref 140–450)
PMV BLD AUTO: 9.4 FL (ref 6–12)
POTASSIUM SERPL-SCNC: 3.6 MMOL/L (ref 3.5–5.2)
POTASSIUM SERPL-SCNC: 4.3 MMOL/L (ref 3.5–5.2)
RBC # BLD AUTO: 4.29 10*6/MM3 (ref 4.14–5.8)
SODIUM SERPL-SCNC: 135 MMOL/L (ref 136–145)
VANCOMYCIN TROUGH SERPL-MCNC: 16 MCG/ML (ref 5–20)
WBC NRBC COR # BLD: 10.42 10*3/MM3 (ref 3.4–10.8)

## 2023-03-20 PROCEDURE — 85025 COMPLETE CBC W/AUTO DIFF WBC: CPT | Performed by: HOSPITALIST

## 2023-03-20 PROCEDURE — 99310 SBSQ NF CARE HIGH MDM 45: CPT | Performed by: INTERNAL MEDICINE

## 2023-03-20 PROCEDURE — 82962 GLUCOSE BLOOD TEST: CPT

## 2023-03-20 PROCEDURE — 99231 SBSQ HOSP IP/OBS SF/LOW 25: CPT | Performed by: INTERNAL MEDICINE

## 2023-03-20 PROCEDURE — 80202 ASSAY OF VANCOMYCIN: CPT

## 2023-03-20 PROCEDURE — 63710000001 INSULIN DETEMIR PER 5 UNITS: Performed by: HOSPITALIST

## 2023-03-20 PROCEDURE — 80048 BASIC METABOLIC PNL TOTAL CA: CPT | Performed by: HOSPITALIST

## 2023-03-20 PROCEDURE — 25010000002 CEFTRIAXONE PER 250 MG: Performed by: STUDENT IN AN ORGANIZED HEALTH CARE EDUCATION/TRAINING PROGRAM

## 2023-03-20 PROCEDURE — 25010000002 AMPICILLIN-SULBACTAM PER 1.5 G: Performed by: INTERNAL MEDICINE

## 2023-03-20 PROCEDURE — 63710000001 INSULIN ASPART PER 5 UNITS: Performed by: HOSPITALIST

## 2023-03-20 PROCEDURE — 94799 UNLISTED PULMONARY SVC/PX: CPT

## 2023-03-20 PROCEDURE — 25010000002 VANCOMYCIN 5 G RECONSTITUTED SOLUTION: Performed by: NURSE PRACTITIONER

## 2023-03-20 PROCEDURE — 84132 ASSAY OF SERUM POTASSIUM: CPT | Performed by: INTERNAL MEDICINE

## 2023-03-20 RX ORDER — POTASSIUM CHLORIDE 20 MEQ/1
40 TABLET, EXTENDED RELEASE ORAL EVERY 4 HOURS
Status: COMPLETED | OUTPATIENT
Start: 2023-03-20 | End: 2023-03-20

## 2023-03-20 RX ADMIN — INSULIN ASPART 36 UNITS: 100 INJECTION, SOLUTION INTRAVENOUS; SUBCUTANEOUS at 21:24

## 2023-03-20 RX ADMIN — Medication 10 ML: at 21:24

## 2023-03-20 RX ADMIN — DICYCLOMINE HYDROCHLORIDE 10 MG: 10 CAPSULE ORAL at 21:23

## 2023-03-20 RX ADMIN — VITAMINS A AND D OINTMENT 1 APPLICATION: 15.5; 53.4 OINTMENT TOPICAL at 21:25

## 2023-03-20 RX ADMIN — BACLOFEN 30 MG: 10 TABLET ORAL at 17:10

## 2023-03-20 RX ADMIN — CEFTRIAXONE 2 G: 2 INJECTION, POWDER, FOR SOLUTION INTRAMUSCULAR; INTRAVENOUS at 08:26

## 2023-03-20 RX ADMIN — DULOXETINE HYDROCHLORIDE 60 MG: 60 CAPSULE, DELAYED RELEASE ORAL at 08:27

## 2023-03-20 RX ADMIN — MODAFINIL 200 MG: 100 TABLET ORAL at 08:43

## 2023-03-20 RX ADMIN — OXYCODONE HYDROCHLORIDE AND ACETAMINOPHEN 500 MG: 500 TABLET ORAL at 08:27

## 2023-03-20 RX ADMIN — POTASSIUM CHLORIDE 40 MEQ: 20 TABLET, EXTENDED RELEASE ORAL at 12:11

## 2023-03-20 RX ADMIN — Medication 10 ML: at 08:26

## 2023-03-20 RX ADMIN — METFORMIN HYDROCHLORIDE 1000 MG: 500 TABLET ORAL at 18:11

## 2023-03-20 RX ADMIN — SACUBITRIL AND VALSARTAN 1 TABLET: 24; 26 TABLET, FILM COATED ORAL at 21:22

## 2023-03-20 RX ADMIN — BACLOFEN 30 MG: 10 TABLET ORAL at 08:26

## 2023-03-20 RX ADMIN — ARIPIPRAZOLE 10 MG: 10 TABLET ORAL at 21:23

## 2023-03-20 RX ADMIN — DICYCLOMINE HYDROCHLORIDE 10 MG: 10 CAPSULE ORAL at 08:43

## 2023-03-20 RX ADMIN — AMPICILLIN SODIUM AND SULBACTAM SODIUM 3 G: 2; 1 INJECTION, POWDER, FOR SOLUTION INTRAMUSCULAR; INTRAVENOUS at 17:05

## 2023-03-20 RX ADMIN — PANTOPRAZOLE SODIUM 40 MG: 40 TABLET, DELAYED RELEASE ORAL at 21:23

## 2023-03-20 RX ADMIN — EMPAGLIFLOZIN 10 MG: 10 TABLET, FILM COATED ORAL at 09:52

## 2023-03-20 RX ADMIN — AMPICILLIN SODIUM AND SULBACTAM SODIUM 3 G: 2; 1 INJECTION, POWDER, FOR SOLUTION INTRAMUSCULAR; INTRAVENOUS at 23:02

## 2023-03-20 RX ADMIN — SACUBITRIL AND VALSARTAN 1 TABLET: 24; 26 TABLET, FILM COATED ORAL at 08:43

## 2023-03-20 RX ADMIN — MAGNESIUM GLUCONATE 500 MG ORAL TABLET 1200 MG: 500 TABLET ORAL at 08:27

## 2023-03-20 RX ADMIN — INSULIN DETEMIR 91 UNITS: 100 INJECTION, SOLUTION SUBCUTANEOUS at 08:25

## 2023-03-20 RX ADMIN — INSULIN ASPART 103 UNITS: 100 INJECTION, SOLUTION INTRAVENOUS; SUBCUTANEOUS at 08:25

## 2023-03-20 RX ADMIN — APIXABAN 5 MG: 5 TABLET, FILM COATED ORAL at 08:43

## 2023-03-20 RX ADMIN — FERROUS SULFATE TAB 325 MG (65 MG ELEMENTAL FE) 325 MG: 325 (65 FE) TAB at 08:27

## 2023-03-20 RX ADMIN — INSULIN ASPART 92 UNITS: 100 INJECTION, SOLUTION INTRAVENOUS; SUBCUTANEOUS at 17:06

## 2023-03-20 RX ADMIN — NYSTATIN: 100000 POWDER TOPICAL at 08:26

## 2023-03-20 RX ADMIN — GABAPENTIN 800 MG: 400 CAPSULE ORAL at 08:43

## 2023-03-20 RX ADMIN — APIXABAN 5 MG: 5 TABLET, FILM COATED ORAL at 21:22

## 2023-03-20 RX ADMIN — DAKIN'S SOLUTION 0.125% (QUARTER STRENGTH): 0.12 SOLUTION at 01:52

## 2023-03-20 RX ADMIN — BUMETANIDE 2 MG: 1 TABLET ORAL at 08:43

## 2023-03-20 RX ADMIN — CARVEDILOL 12.5 MG: 6.25 TABLET, FILM COATED ORAL at 08:26

## 2023-03-20 RX ADMIN — AMPICILLIN SODIUM AND SULBACTAM SODIUM 3 G: 2; 1 INJECTION, POWDER, FOR SOLUTION INTRAMUSCULAR; INTRAVENOUS at 12:07

## 2023-03-20 RX ADMIN — GABAPENTIN 800 MG: 400 CAPSULE ORAL at 21:23

## 2023-03-20 RX ADMIN — DAKIN'S SOLUTION 0.125% (QUARTER STRENGTH): 0.12 SOLUTION at 18:11

## 2023-03-20 RX ADMIN — SUCRALFATE 1 G: 1 TABLET ORAL at 08:27

## 2023-03-20 RX ADMIN — CARVEDILOL 12.5 MG: 6.25 TABLET, FILM COATED ORAL at 18:11

## 2023-03-20 RX ADMIN — BACLOFEN 30 MG: 10 TABLET ORAL at 12:07

## 2023-03-20 RX ADMIN — INSULIN ASPART 45 UNITS: 100 INJECTION, SOLUTION INTRAVENOUS; SUBCUTANEOUS at 12:07

## 2023-03-20 RX ADMIN — PANTOPRAZOLE SODIUM 40 MG: 40 TABLET, DELAYED RELEASE ORAL at 08:42

## 2023-03-20 RX ADMIN — GABAPENTIN 800 MG: 400 CAPSULE ORAL at 17:05

## 2023-03-20 RX ADMIN — METFORMIN HYDROCHLORIDE 1000 MG: 500 TABLET ORAL at 08:27

## 2023-03-20 RX ADMIN — VANCOMYCIN HYDROCHLORIDE 1250 MG: 5 INJECTION, POWDER, LYOPHILIZED, FOR SOLUTION INTRAVENOUS at 09:52

## 2023-03-20 RX ADMIN — VITAMINS A AND D OINTMENT 1 APPLICATION: 15.5; 53.4 OINTMENT TOPICAL at 08:43

## 2023-03-20 RX ADMIN — INSULIN DETEMIR 91 UNITS: 100 INJECTION, SOLUTION SUBCUTANEOUS at 21:24

## 2023-03-20 RX ADMIN — BACLOFEN 30 MG: 10 TABLET ORAL at 21:23

## 2023-03-20 RX ADMIN — ATORVASTATIN CALCIUM 10 MG: 10 TABLET, FILM COATED ORAL at 21:23

## 2023-03-20 RX ADMIN — SPIRONOLACTONE 25 MG: 25 TABLET ORAL at 08:43

## 2023-03-20 RX ADMIN — DULOXETINE HYDROCHLORIDE 60 MG: 60 CAPSULE, DELAYED RELEASE ORAL at 21:23

## 2023-03-20 RX ADMIN — NYSTATIN: 100000 POWDER TOPICAL at 21:25

## 2023-03-20 RX ADMIN — Medication 1 CAPSULE: at 08:43

## 2023-03-20 RX ADMIN — POTASSIUM CHLORIDE 40 MEQ: 20 TABLET, EXTENDED RELEASE ORAL at 08:43

## 2023-03-20 RX ADMIN — Medication 1 TABLET: at 08:27

## 2023-03-20 RX ADMIN — Medication 1000 UNITS: at 08:27

## 2023-03-20 NOTE — PLAN OF CARE
Goal Outcome Evaluation:           Progress: no change  Outcome Evaluation: Patient has been resting in bed throughout the shift, wound care has been completed, patient has voiced no complaints or concerns at this time
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
## 2023-03-20 NOTE — PROGRESS NOTES
PROGRESS NOTE         Patient Identification:  Name:  Ken Krueger  Age:  47 y.o.  Sex:  male  :  1975  MRN:  [id number]  Visit Number:  [id number]  Primary Care Provider:  Franchesca Hodges APRN         LOS: 27 days       ----------------------------------------------------------------------------------------------------------------------  Subjective       Chief Complaints:    No chief complaint on file.        Interval History:      Patient resting in bed this morning.  Currently on 100 BiPAP this morning.  No issues reported overnight.  Case discussed with wound care APRN reports wound is healing well since debridement, foul odor resolved.  Afebrile, no reported increased ostomy output.  Lungs clear to auscultation bilaterally.  Abdomen soft, nontender.  WBC 10.42.  Wound culture from 3/17/2023 preliminary reports growth of Enterococcus faecalis.     Review of Systems:    Constitutional: No fever, chills. No night sweats. No appetite change or unexpected weight change. No fatigue.  Eyes: no eye drainage, itching or redness.  HEENT: no mouth sores, dysphagia or nose bleed.  Respiratory: no for shortness of breath, cough or production of sputum.  Cardiovascular: no chest pain, no palpitations, no orthopnea.  Gastrointestinal: no nausea, vomiting or diarrhea. No abdominal pain, hematemesis or rectal bleeding.  Genitourinary: no dysuria or polyuria.  Hematologic/lymphatic: no lymph node abnormalities, no easy bruising or easy bleeding.  Musculoskeletal: no muscle or joint pain.  Skin: No rash and no itching.  Neurological: no loss of consciousness, no seizure, no headache.  Behavioral/Psych: no depression or suicidal ideation.  Endocrine: no hot flashes.  Immunologic: negative.    ----------------------------------------------------------------------------------------------------------------------      Objective       Current Hospital Meds:    Pharmacy Consult - Pharmacy to dose,        ----------------------------------------------------------------------------------------------------------------------    Vital Signs:  Temp:  [97 °F (36.1 °C)-97.2 °F (36.2 °C)] 97 °F (36.1 °C)  Heart Rate:  [80-88] 85  Resp:  [16-18] 16  BP: ()/(60-67) 113/60  No data found.  SpO2 Percentage    03/13/23 1915 03/17/23 0736 03/20/23 0835   SpO2: 95% 98% 95%     SpO2:  [95 %] 95 %  on   ;   Device (Oxygen Therapy): other (see comments)    Body mass index is 42.34 kg/m².  Wt Readings from Last 3 Encounters:   02/21/23 (!) 138 kg (303 lb 9.2 oz)   02/20/23 (!) 139 kg (306 lb 14.1 oz)   12/13/22 (!) 141 kg (310 lb 14.4 oz)        Intake/Output Summary (Last 24 hours) at 3/20/2023 1215  Last data filed at 3/20/2023 0900  Gross per 24 hour   Intake 990 ml   Output 5850 ml   Net -4860 ml     Diet: Diabetic Diets; Consistent Carbohydrate; Texture: Regular Texture (IDDSI 7); Fluid Consistency: Thin (IDDSI 0)  ----------------------------------------------------------------------------------------------------------------------      Physical Exam:    Constitutional: Morbidly obese  male is resting in bed on BiPAP this morning.  Primary RN at bedside.  HENT:  Head: Normocephalic and atraumatic.  Mouth:  Moist mucous membranes.    Eyes:  Conjunctivae and EOM are normal.  No scleral icterus.  Neck:  Neck supple.  No JVD present.    Cardiovascular:  Normal rate, regular rhythm and normal heart sounds with no murmur. No edema.  Pulmonary/Chest:  No respiratory distress, no wheezes, no crackles, with normal breath sounds and good air movement.  Lungs clear to auscultation bilaterally  Abdominal:  Morbidly obese.  Soft.  Bowel sounds are normal.  No distension and no tenderness.  Ileal conduit in place.  Colostomy bag in place with soft stool.  Musculoskeletal:  No tenderness.  No swelling or redness of joints.  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 PICC to right upper arm with no signs of infection.  Neurological:  Alert and  oriented to person, place, and time.  No facial droop.  No slurred speech.   Skin:  Stage IV sacral decubitus wound.  Dressing in place, packing with Dakin's solution.  Psychiatric:  Normal mood and affect.  Behavior is normal.      ----------------------------------------------------------------------------------------------------------------------            Results from last 7 days   Lab Units 03/20/23  0423 03/16/23 0427   CRP mg/dL  --  6.43*   WBC 10*3/mm3 10.42 10.84*   HEMOGLOBIN g/dL 11.1* 11.3*   HEMATOCRIT % 38.3 38.5   MCV fL 89.3 88.9   MCHC g/dL 29.0* 29.4*   PLATELETS 10*3/mm3 305 291     Results from last 7 days   Lab Units 03/20/23  0423 03/16/23  2106   SODIUM mmol/L 135*  --    POTASSIUM mmol/L 3.6  --    CHLORIDE mmol/L 99  --    CO2 mmol/L 21.8*  --    BUN mg/dL 53*  --    CREATININE mg/dL 1.07  --    CALCIUM mg/dL 9.5  --    GLUCOSE mg/dL 292* 243*   Estimated Creatinine Clearance: 123.3 mL/min (by C-G formula based on SCr of 1.07 mg/dL).  No results found for: AMMONIA    Glucose   Date/Time Value Ref Range Status   03/20/2023 1139 199 (H) 70 - 130 mg/dL Final     Comment:     Meter: [id number] : [id number] karlos farleyen   03/20/2023 0737 194 (H) 70 - 130 mg/dL Final     Comment:     Meter: [id number] : [id number] karlos arthur   03/19/2023 2157 190 (H) 70 - 130 mg/dL Final     Comment:     Meter: [id number] : [id number] MARY JANE FUSION   03/19/2023 1715 114 70 - 130 mg/dL Final     Comment:     Meter: [id number] : [id number] ALBA GAMBREL   03/19/2023 1209 191 (H) 70 - 130 mg/dL Final     Comment:     Meter: [id number] : [id number] ALBA GAMBREL   03/19/2023 0747 175 (H) 70 - 130 mg/dL Final     Comment:     Meter: [id number] : [id number] CINDY STRONG   03/19/2023 0658 184 (H) 70 - 130 mg/dL Final     Comment:     Meter: [id number] : [id number] CINDY STRONG   03/18/2023 2008 304 (H) 70 - 130 mg/dL Final     Comment:     Meter: [id number] : [id number] Nessa  Estrada     Lab Results   Component Value Date    HGBA1C 11.50 (H) 02/13/2023     Lab Results   Component Value Date    TSH 3.780 07/19/2022    FREET4 1.31 06/02/2021       Blood Culture   Date Value Ref Range Status   02/12/2023 No growth at 24 hours  Preliminary   02/12/2023 No growth at 24 hours  Preliminary     No results found for: URINECX  No results found for: WOUNDCX  No results found for: STOOLCX  No results found for: RESPCX  Pain Management Panel       Pain Management Panel Latest Ref Rng & Units 6/2/2021 8/19/2018    AMPHETAMINES SCREEN, URINE Negative Negative Negative    BARBITURATES SCREEN Negative Negative Negative    BENZODIAZEPINE SCREEN, URINE Negative Negative Negative    BUPRENORPHINEUR Negative Negative Negative    COCAINE SCREEN, URINE Negative Negative Negative    METHADONE SCREEN, URINE Negative Negative Negative              ----------------------------------------------------------------------------------------------------------------------  Imaging Results (Last 24 Hours)       ** No results found for the last 24 hours. **            -----------------------------------------------------------------------------------  Pertinent Infectious Disease Results     CT abdomen pelvis with contrast on 2/12/2023 showed there is a new decubitus ulcer just to the right of midline at the level of the coccyx with constellation of imaging findings most characteristic of acute infection/osteomyelitis. Small pocket of air and fluid adjacent to the coccyx measuring 3.7 x 4.1 cm could reflect phlegmon or abscess. Chronic bilateral decubitus ulcers at the level of the ischial tuberosities bilaterally with imaging findings suggestive of chronic infection/osteomyelitis, similar to prior. Hepatic steatosis and hepatomegaly with borderline splenomegaly. Nonobstructing left renal stones. Postoperative changes of left lower quadrant and colostomy and right mid abdominal ileal conduit formation. Diverticulosis. No  diverticulitis or bowel obstruction. COVID-19 and flu A/B PCR on 2/12/2023 was negative.    Status post excisional debridement of sacral ulcer on 2/13/2023 with Dr. Taylor.    Status post debridement with deep tissue culture with wound care APRILDA Handy on 3/17/2023.      Assessment/Plan         ASSESSMENT:    Septic shock with lactic acid greater than 4 on admission  Acute and chronic decubitus ulcerations with osteomyelitis and possible abscess      PLAN:    I saw the patient this morning with GUANACO Flores and got report from primary RN and here are my findings:    Patient resting in bed this morning.  Currently on 100 BiPAP this morning.  No issues reported overnight.    I discussed the case with wound care APRN reports wound is healing well since debridement, foul odor resolved.  Afebrile, no reported increased ostomy output.     On exam, lungs clear to auscultation bilaterally.  Abdomen soft, nontender.  WBC 10.42.  Wound culture from 3/17/2023 preliminary reports growth of Enterococcus faecalis.  Based on updated wound culture results, I discontinued ceftriaxone and vancomycin and initiated Unasyn 3 g IV every 6 hours x14 more days and discussed changes with primary team.    Code Status:   Code Status and Medical Interventions:   Ordered at: 02/21/23 1034     Code Status (Patient has no pulse and is not breathing):    CPR (Attempt to Resuscitate)     Medical Interventions (Patient has pulse or is breathing):    Full Support       GUANACO Flores  03/20/23  12:15 EDT    Electronically signed by GUANACO Flores, 03/20/23, 12:17 PM EDT.    Electronically signed by Tra Jain MD, 03/20/23, 12:33 PM EDT.

## 2023-03-20 NOTE — CASE MANAGEMENT/SOCIAL WORK
Discharge Planning Assessment   Fabricio     Patient Name: Ken Krueger  MRN: [id number]  Today's Date: 3/20/2023    Admit Date: 2/21/2023     Discharge Plan     Row Name 03/20/23 1408       Plan    Plan Pt was admitted to swing bed on 2/21/23 for IV abx through 4/1/23. Swing bed RN submitted updated clinicals to pt's insurance today. Pt lives with his mother and brother and he plans to return home at discharge. Pt utilized [facility]home health prior to admission. [facility] [phone] to be contacted at discharge. Pt has a hospital bed, trapeze bar, patricio lift, and wheelchair via [facility] and an electric wheelchair. SS to follow.              Continued Care and Services - Admitted Since 2/21/2023     Home Medical Care     Service Provider Request Status Selected Services Address Phone Fax Patient Preferred    [facility] Pending - No Request Sent N/A  [facility] [id number] [phone] -- --              BRENT Burns
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
## 2023-03-20 NOTE — PROGRESS NOTES
[facility] HOSPITALIST PROGRESS NOTE     Patient Identification:  Name:  Ken Krueger  Age:  47 y.o.  Sex:  male  :  1975  MRN:  [id number]  Visit Number:  [id number]  ROOM: [address] [place] [address]     Primary Care Provider:  Franchesca Hodges APRN    Length of stay in inpatient status:  27    Subjective     Chief Compliant:  No chief complaint on file.      History of Presenting Illness:    Patient reports feeling well. Denies any new complaints. No family bedside.     ROS:  Otherwise 10 point ROS negative other than documented above in HPI.     Objective     Current Hospital Meds:Acidophilus/Pectin, 1 capsule, Oral, Daily  ampicillin-sulbactam, 3 g, Intravenous, Q6H  apixaban, 5 mg, Oral, Q12H  ARIPiprazole, 10 mg, Oral, Nightly  ascorbic acid, 500 mg, Oral, Daily  atorvastatin, 10 mg, Oral, Nightly  baclofen, 30 mg, Oral, 4x Daily With Meals & Nightly  bumetanide, 2 mg, Oral, Daily  carvedilol, 12.5 mg, Oral, BID With Meals  cholecalciferol, 1,000 Units, Oral, Daily  dicyclomine, 10 mg, Oral, BID  DULoxetine, 60 mg, Oral, BID  empagliflozin, 10 mg, Oral, Daily  ferrous sulfate, 325 mg, Oral, Daily With Breakfast  gabapentin, 800 mg, Oral, TID  insulin aspart, 1-200 Units, Subcutaneous, 4x Daily AC & at Bedtime  insulin detemir, 1-200 Units, Subcutaneous, BID - Glucommander  magic barrier cream, 1 application, Topical, BID  magnesium oxide, 1,200 mg, Oral, Daily  metFORMIN, 1,000 mg, Oral, BID With Meals  modafinil, 200 mg, Oral, Daily  multivitamin with minerals, 1 tablet, Oral, Daily  nystatin, , Topical, Q12H  pantoprazole, 40 mg, Oral, BID  sacubitril-valsartan, 1 tablet, Oral, Q12H  sodium chloride, 10 mL, Intravenous, Q12H  sodium hypochlorite, , Topical, Q12H  spironolactone, 25 mg, Oral, Daily  sucralfate, 1 g, Oral, Daily    Pharmacy Consult - Pharmacy to dose,         Current Antimicrobial Therapy:  Anti-Infectives (From admission, onward)    Ordered     Dose/Rate Route Frequency Start Stop     03/20/23 1013  ampicillin-sulbactam (UNASYN) 3 g in sodium chloride 0.9 % 100 mL IVPB-VTB        Ordering Provider: Tra Jain MD    3 g  over 30 Minutes Intravenous Every 6 Hours 03/20/23 1100 04/03/23 1059    03/19/23 0845  vancomycin 2500 mg/500 mL 0.9% NS IVPB ([facility])        Ordering Provider: Verenice Deluca APRN    2,500 mg  over 180 Minutes Intravenous Once 03/19/23 1000 03/19/23 1424        Current Diuretic Therapy:  Diuretics (From admission, onward)    Ordered     Dose/Rate Route Frequency Start Stop    03/19/23 1032  metOLazone (ZAROXOLYN) tablet 2.5 mg        Ordering Provider: Yovana Pack MD    2.5 mg Oral Once 03/19/23 1200 03/19/23 1236    03/14/23 0725  metOLazone (ZAROXOLYN) tablet 2.5 mg        Ordering Provider: Yovana Pack MD    2.5 mg Oral Daily 03/14/23 0900 03/14/23 1004    02/21/23 1034  bumetanide (BUMEX) tablet 2 mg        Ordering Provider: Robinson Yanes DO    2 mg Oral Daily 02/22/23 0900      02/21/23 1034  spironolactone (ALDACTONE) tablet 25 mg        Ordering Provider: Robinson Yanes DO    25 mg Oral Daily 02/22/23 0900          ----------------------------------------------------------------------------------------------------------------------  Vital Signs:  Temp:  [97 °F (36.1 °C)-97.2 °F (36.2 °C)] 97 °F (36.1 °C)  Heart Rate:  [80-85] 85  Resp:  [16-18] 16  BP: ()/(60-67) 113/60  SpO2:  [95 %] 95 %  on   ;   Device (Oxygen Therapy): other (see comments)  Body mass index is 42.34 kg/m².    Wt Readings from Last 3 Encounters:   02/21/23 (!) 138 kg (303 lb 9.2 oz)   02/20/23 (!) 139 kg (306 lb 14.1 oz)   12/13/22 (!) 141 kg (310 lb 14.4 oz)     Intake & Output (last 3 days)       03/17 0701  03/18 0700 03/18 0701  03/19 0700 03/19 0701  03/20 0700 03/20 0701  03/21 0700    P.O. 1800 1650 1050 240    IV Piggyback 100       Total Intake(mL/kg) 1900 (13.8) 1650 (12) 1050 (7.6) 240 (1.7)    Urine (mL/kg/hr) 4950 (1.5) 2900 (0.9) 3600 (1.1) 2800 (2.9)     Stool  450    Total Output 5412 1978 4929 3993    Net -1496 -9735 -7822 -9156                Diet: Diabetic Diets; Consistent Carbohydrate; Texture: Regular Texture (IDDSI 7); Fluid Consistency: Thin (IDDSI 0)  ----------------------------------------------------------------------------------------------------------------------  Physical exam:  Constitutional:  Well-developed and well-nourished.  No respiratory distress. Obese.   HENT:  Head:  Normocephalic and atraumatic.  Mouth:  Moist mucous membranes.    Eyes:  Conjunctivae and EOM are normal. No scleral icterus.    Neck:  Neck supple.  No JVD present.    Cardiovascular:  Normal rate, regular rhythm and normal heart sounds with no murmur.  Pulmonary/Chest:  No respiratory distress, no wheezes, no crackles, with normal breath sounds and good air movement.  Abdominal:  Soft.  Bowel sounds are normal.  No distension and no tenderness.   Musculoskeletal:  No edema, no tenderness, and no deformity.  No red or swollen joints anywhere.    Neurological:  Alert and oriented to person, place, and time.  No cranial nerve deficit.  No tongue deviation.  No facial droop.  No slurred speech.   Skin:  Skin is warm and dry. No rash noted. No pallor.   Peripheral vascular:  Pulses in all 4 extremities with no clubbing, no cyanosis, no edema.  ----------------------------------------------------------------------------------------------------------------------  Tele:    ----------------------------------------------------------------------------------------------------------------------  Results from last 7 days   Lab Units 03/20/23 0423 03/16/23  0427   CRP mg/dL  --  6.43*   WBC 10*3/mm3 10.42 10.84*   HEMOGLOBIN g/dL 11.1* 11.3*   HEMATOCRIT % 38.3 38.5   MCV fL 89.3 88.9   MCHC g/dL 29.0* 29.4*   PLATELETS 10*3/mm3 305 291         Results from last 7 days   Lab Units 03/20/23 0423 03/16/23 2106   SODIUM mmol/L 135*  --    POTASSIUM mmol/L 3.6  --    CHLORIDE  mmol/L 99  --    CO2 mmol/L 21.8*  --    BUN mg/dL 53*  --    CREATININE mg/dL 1.07  --    CALCIUM mg/dL 9.5  --    GLUCOSE mg/dL 292* 243*   Estimated Creatinine Clearance: 123.3 mL/min (by C-G formula based on SCr of 1.07 mg/dL).  No results found for: AMMONIA              Glucose   Date/Time Value Ref Range Status   03/20/2023 1139 199 (H) 70 - 130 mg/dL Final     Comment:     Meter: [id number] : [id number] karlos arthur   03/20/2023 0737 194 (H) 70 - 130 mg/dL Final     Comment:     Meter: [id number] : [id number] karlos arthur   03/19/2023 2157 190 (H) 70 - 130 mg/dL Final     Comment:     Meter: [id number] : [id number] MARY JANE FUSION   03/19/2023 1715 114 70 - 130 mg/dL Final     Comment:     Meter: [id number] : [id number] KARLISSA GAMBREL   03/19/2023 1209 191 (H) 70 - 130 mg/dL Final     Comment:     Meter: [id number] : [id number] KARLISSA GAMBREL   03/19/2023 0747 175 (H) 70 - 130 mg/dL Final     Comment:     Meter: [id number] : [id number] CINDY LIGIA   03/19/2023 0658 184 (H) 70 - 130 mg/dL Final     Comment:     Meter: [id number] : [id number] CINDY STRONG   03/18/2023 2008 304 (H) 70 - 130 mg/dL Final     Comment:     Meter: [id number] : [id number] [facility]     Lab Results   Component Value Date    TSH 3.780 07/19/2022    FREET4 1.31 06/02/2021     No results found for: PREGTESTUR, PREGSERUM, HCG, HCGQUANT  Pain Management Panel     Pain Management Panel Latest Ref Rng & Units 6/2/2021 8/19/2018    AMPHETAMINES SCREEN, URINE Negative Negative Negative    BARBITURATES SCREEN Negative Negative Negative    BENZODIAZEPINE SCREEN, URINE Negative Negative Negative    BUPRENORPHINEUR Negative Negative Negative    COCAINE SCREEN, URINE Negative Negative Negative    METHADONE SCREEN, URINE Negative Negative Negative        Brief Urine Lab Results  (Last result in the past 365 days)      Color   Clarity   Blood   Leuk Est   Nitrite   Protein   CREAT   Urine HCG        12/09/22  1928 Yellow   Clear   Trace   Small (1+)   Negative   Negative               No results found for: BLOODCX  No results found for: URINECX  Wound Culture   Date Value Ref Range Status   03/17/2023 Light growth (2+) Enterococcus faecalis (A)  Preliminary   03/17/2023 Scant growth (1+) Normal Skin Mariza  Preliminary     No results found for: STOOLCX  No results found for: RESPCX  No results found for: AFBCX  Results from last 7 days   Lab Units 03/16/23  0427   CRP mg/dL 6.43*       I have personally looked at the labs and they are summarized above.  ----------------------------------------------------------------------------------------------------------------------  Detailed radiology reports for the last 24 hours:    Imaging Results (Last 24 Hours)     ** No results found for the last 24 hours. **        Assessment & Plan    -Stage IV sacral decubiti with osteomyelitis present on admission E. coli on culture  -Morbidly obese complicating all aspects of care  -Diabetes type 2 with hyperglycemia  -Paraplegia  -History of PE on chronic anticoagulation  -ARLIN on CPAP  -History of nonischemic cardiomyopathy  -History of ileal conduit and colostomy     Blood glucoses improved today with gluccomander. Continue ceftriaxone. Continue wound vac and wound care.     Updated.wound culture from 3/17 revealed Enterococcus faecalis and based on sensitivities, ID transitioned abx regimen to Unasyn monotherapy for 14 more days. Appreciate recs.      Dispo: Patient admitted to swing bed and is getting IV abx through 4/3 per ID.     VTE Prophylaxis:   Mechanical Order History:     None      Pharmalogical Order History:      Ordered     Dose Route Frequency Stop    02/21/23 1034  apixaban (ELIQUIS) tablet 5 mg         5 mg PO Every 12 Hours Scheduled --                  Brannon Adrian MD  [facility]  03/20/23  14:00 EDT

## 2023-03-20 NOTE — PLAN OF CARE
Goal Outcome Evaluation:              Outcome Evaluation: Patient has rested in bed, wound care completed per order, no request or complaints at this time, VSS, Will continue plan of care.

## 2023-03-21 LAB
BACTERIA SPEC AEROBE CULT: ABNORMAL
GLUCOSE BLDC GLUCOMTR-MCNC: 138 MG/DL (ref 70–130)
GLUCOSE BLDC GLUCOMTR-MCNC: 143 MG/DL (ref 70–130)
GLUCOSE BLDC GLUCOMTR-MCNC: 149 MG/DL (ref 70–130)
GLUCOSE BLDC GLUCOMTR-MCNC: 188 MG/DL (ref 70–130)
GRAM STN SPEC: ABNORMAL

## 2023-03-21 PROCEDURE — 63710000001 INSULIN ASPART PER 5 UNITS: Performed by: HOSPITALIST

## 2023-03-21 PROCEDURE — 63710000001 INSULIN DETEMIR PER 5 UNITS: Performed by: HOSPITALIST

## 2023-03-21 PROCEDURE — 25010000002 AMPICILLIN-SULBACTAM PER 1.5 G: Performed by: INTERNAL MEDICINE

## 2023-03-21 PROCEDURE — 82962 GLUCOSE BLOOD TEST: CPT

## 2023-03-21 RX ADMIN — METFORMIN HYDROCHLORIDE 1000 MG: 500 TABLET ORAL at 08:16

## 2023-03-21 RX ADMIN — DAKIN'S SOLUTION 0.125% (QUARTER STRENGTH): 0.12 SOLUTION at 17:20

## 2023-03-21 RX ADMIN — METFORMIN HYDROCHLORIDE 1000 MG: 500 TABLET ORAL at 17:20

## 2023-03-21 RX ADMIN — Medication 10 ML: at 20:30

## 2023-03-21 RX ADMIN — Medication 1 TABLET: at 08:16

## 2023-03-21 RX ADMIN — GABAPENTIN 800 MG: 400 CAPSULE ORAL at 08:16

## 2023-03-21 RX ADMIN — GABAPENTIN 800 MG: 400 CAPSULE ORAL at 20:39

## 2023-03-21 RX ADMIN — AMPICILLIN SODIUM AND SULBACTAM SODIUM 3 G: 2; 1 INJECTION, POWDER, FOR SOLUTION INTRAMUSCULAR; INTRAVENOUS at 05:55

## 2023-03-21 RX ADMIN — BACLOFEN 30 MG: 10 TABLET ORAL at 12:23

## 2023-03-21 RX ADMIN — SPIRONOLACTONE 25 MG: 25 TABLET ORAL at 08:16

## 2023-03-21 RX ADMIN — CARVEDILOL 12.5 MG: 6.25 TABLET, FILM COATED ORAL at 17:20

## 2023-03-21 RX ADMIN — NYSTATIN: 100000 POWDER TOPICAL at 08:09

## 2023-03-21 RX ADMIN — VITAMINS A AND D OINTMENT 1 APPLICATION: 15.5; 53.4 OINTMENT TOPICAL at 20:30

## 2023-03-21 RX ADMIN — AMPICILLIN SODIUM AND SULBACTAM SODIUM 3 G: 2; 1 INJECTION, POWDER, FOR SOLUTION INTRAMUSCULAR; INTRAVENOUS at 17:20

## 2023-03-21 RX ADMIN — APIXABAN 5 MG: 5 TABLET, FILM COATED ORAL at 20:29

## 2023-03-21 RX ADMIN — OXYCODONE HYDROCHLORIDE AND ACETAMINOPHEN 500 MG: 500 TABLET ORAL at 08:15

## 2023-03-21 RX ADMIN — BACLOFEN 30 MG: 10 TABLET ORAL at 08:16

## 2023-03-21 RX ADMIN — DULOXETINE HYDROCHLORIDE 60 MG: 60 CAPSULE, DELAYED RELEASE ORAL at 20:29

## 2023-03-21 RX ADMIN — PANTOPRAZOLE SODIUM 40 MG: 40 TABLET, DELAYED RELEASE ORAL at 20:29

## 2023-03-21 RX ADMIN — APIXABAN 5 MG: 5 TABLET, FILM COATED ORAL at 08:15

## 2023-03-21 RX ADMIN — GABAPENTIN 800 MG: 400 CAPSULE ORAL at 17:20

## 2023-03-21 RX ADMIN — Medication 10 ML: at 08:17

## 2023-03-21 RX ADMIN — ATORVASTATIN CALCIUM 10 MG: 10 TABLET, FILM COATED ORAL at 20:29

## 2023-03-21 RX ADMIN — Medication 1 CAPSULE: at 08:16

## 2023-03-21 RX ADMIN — BACLOFEN 30 MG: 10 TABLET ORAL at 20:29

## 2023-03-21 RX ADMIN — INSULIN ASPART 99 UNITS: 100 INJECTION, SOLUTION INTRAVENOUS; SUBCUTANEOUS at 17:20

## 2023-03-21 RX ADMIN — INSULIN ASPART 66 UNITS: 100 INJECTION, SOLUTION INTRAVENOUS; SUBCUTANEOUS at 12:23

## 2023-03-21 RX ADMIN — AMPICILLIN SODIUM AND SULBACTAM SODIUM 3 G: 2; 1 INJECTION, POWDER, FOR SOLUTION INTRAMUSCULAR; INTRAVENOUS at 22:50

## 2023-03-21 RX ADMIN — INSULIN ASPART 12 UNITS: 100 INJECTION, SOLUTION INTRAVENOUS; SUBCUTANEOUS at 20:39

## 2023-03-21 RX ADMIN — EMPAGLIFLOZIN 10 MG: 10 TABLET, FILM COATED ORAL at 08:19

## 2023-03-21 RX ADMIN — INSULIN ASPART 108 UNITS: 100 INJECTION, SOLUTION INTRAVENOUS; SUBCUTANEOUS at 08:14

## 2023-03-21 RX ADMIN — DICYCLOMINE HYDROCHLORIDE 10 MG: 10 CAPSULE ORAL at 20:29

## 2023-03-21 RX ADMIN — SUCRALFATE 1 G: 1 TABLET ORAL at 08:17

## 2023-03-21 RX ADMIN — Medication 1000 UNITS: at 08:15

## 2023-03-21 RX ADMIN — BUMETANIDE 2 MG: 1 TABLET ORAL at 08:15

## 2023-03-21 RX ADMIN — SACUBITRIL AND VALSARTAN 1 TABLET: 24; 26 TABLET, FILM COATED ORAL at 20:29

## 2023-03-21 RX ADMIN — AMPICILLIN SODIUM AND SULBACTAM SODIUM 3 G: 2; 1 INJECTION, POWDER, FOR SOLUTION INTRAMUSCULAR; INTRAVENOUS at 12:23

## 2023-03-21 RX ADMIN — INSULIN DETEMIR 91 UNITS: 100 INJECTION, SOLUTION SUBCUTANEOUS at 20:40

## 2023-03-21 RX ADMIN — BACLOFEN 30 MG: 10 TABLET ORAL at 17:20

## 2023-03-21 RX ADMIN — ARIPIPRAZOLE 10 MG: 10 TABLET ORAL at 20:29

## 2023-03-21 RX ADMIN — MAGNESIUM GLUCONATE 500 MG ORAL TABLET 1200 MG: 500 TABLET ORAL at 08:15

## 2023-03-21 RX ADMIN — DULOXETINE HYDROCHLORIDE 60 MG: 60 CAPSULE, DELAYED RELEASE ORAL at 08:16

## 2023-03-21 RX ADMIN — INSULIN DETEMIR 91 UNITS: 100 INJECTION, SOLUTION SUBCUTANEOUS at 08:14

## 2023-03-21 RX ADMIN — MODAFINIL 200 MG: 100 TABLET ORAL at 08:16

## 2023-03-21 RX ADMIN — PANTOPRAZOLE SODIUM 40 MG: 40 TABLET, DELAYED RELEASE ORAL at 08:15

## 2023-03-21 RX ADMIN — NYSTATIN: 100000 POWDER TOPICAL at 20:30

## 2023-03-21 RX ADMIN — VITAMINS A AND D OINTMENT 1 APPLICATION: 15.5; 53.4 OINTMENT TOPICAL at 08:09

## 2023-03-21 RX ADMIN — FERROUS SULFATE TAB 325 MG (65 MG ELEMENTAL FE) 325 MG: 325 (65 FE) TAB at 08:15

## 2023-03-21 RX ADMIN — DICYCLOMINE HYDROCHLORIDE 10 MG: 10 CAPSULE ORAL at 08:17

## 2023-03-21 NOTE — PROGRESS NOTES
Antimicrobial Length of Therapy:    Unasyn day 2 of therapy, to be continued until 4/3.     Thank you,   Esther Vick, Pharm.D.   Pharmacy Resident   3/21/2023  13:16 EDT

## 2023-03-21 NOTE — PLAN OF CARE
Goal Outcome Evaluation:           Progress: no change  Outcome Evaluation: Patient has been resting in bed throughout the shift, wound care has been completed per orders. Patient has no complaints or concerns at this time

## 2023-03-21 NOTE — PLAN OF CARE
Goal Outcome Evaluation:  Plan of Care Reviewed With: patient        Progress: no change  Outcome Evaluation: pt resting quietly at this time. cpap in use. no acute changes noted. insulin given per glucomander. will continue with poc
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
## 2023-03-22 LAB
B PARAPERT DNA SPEC QL NAA+PROBE: NOT DETECTED
B PERT DNA SPEC QL NAA+PROBE: NOT DETECTED
C PNEUM DNA NPH QL NAA+NON-PROBE: NOT DETECTED
FLUAV SUBTYP SPEC NAA+PROBE: NOT DETECTED
FLUBV RNA ISLT QL NAA+PROBE: NOT DETECTED
GLUCOSE BLDC GLUCOMTR-MCNC: 122 MG/DL (ref 70–130)
GLUCOSE BLDC GLUCOMTR-MCNC: 189 MG/DL (ref 70–130)
GLUCOSE BLDC GLUCOMTR-MCNC: 197 MG/DL (ref 70–130)
GLUCOSE BLDC GLUCOMTR-MCNC: 216 MG/DL (ref 70–130)
HADV DNA SPEC NAA+PROBE: NOT DETECTED
HCOV 229E RNA SPEC QL NAA+PROBE: NOT DETECTED
HCOV HKU1 RNA SPEC QL NAA+PROBE: NOT DETECTED
HCOV NL63 RNA SPEC QL NAA+PROBE: NOT DETECTED
HCOV OC43 RNA SPEC QL NAA+PROBE: NOT DETECTED
HMPV RNA NPH QL NAA+NON-PROBE: NOT DETECTED
HPIV1 RNA ISLT QL NAA+PROBE: NOT DETECTED
HPIV2 RNA SPEC QL NAA+PROBE: NOT DETECTED
HPIV3 RNA NPH QL NAA+PROBE: NOT DETECTED
HPIV4 P GENE NPH QL NAA+PROBE: NOT DETECTED
M PNEUMO IGG SER IA-ACNC: NOT DETECTED
RHINOVIRUS RNA SPEC NAA+PROBE: NOT DETECTED
RSV RNA NPH QL NAA+NON-PROBE: NOT DETECTED
SARS-COV-2 RNA NPH QL NAA+NON-PROBE: NOT DETECTED

## 2023-03-22 PROCEDURE — 99309 SBSQ NF CARE MODERATE MDM 30: CPT | Performed by: NURSE PRACTITIONER

## 2023-03-22 PROCEDURE — 63710000001 INSULIN DETEMIR PER 5 UNITS: Performed by: HOSPITALIST

## 2023-03-22 PROCEDURE — 63710000001 INSULIN ASPART PER 5 UNITS: Performed by: HOSPITALIST

## 2023-03-22 PROCEDURE — 0202U NFCT DS 22 TRGT SARS-COV-2: CPT | Performed by: INTERNAL MEDICINE

## 2023-03-22 PROCEDURE — 82962 GLUCOSE BLOOD TEST: CPT

## 2023-03-22 PROCEDURE — 25010000002 AMPICILLIN-SULBACTAM PER 1.5 G: Performed by: INTERNAL MEDICINE

## 2023-03-22 RX ADMIN — NYSTATIN: 100000 POWDER TOPICAL at 21:13

## 2023-03-22 RX ADMIN — DICYCLOMINE HYDROCHLORIDE 10 MG: 10 CAPSULE ORAL at 21:12

## 2023-03-22 RX ADMIN — Medication 10 ML: at 08:15

## 2023-03-22 RX ADMIN — Medication 1000 UNITS: at 08:13

## 2023-03-22 RX ADMIN — Medication 1 TABLET: at 08:14

## 2023-03-22 RX ADMIN — INSULIN ASPART 6 UNITS: 100 INJECTION, SOLUTION INTRAVENOUS; SUBCUTANEOUS at 21:09

## 2023-03-22 RX ADMIN — AMPICILLIN SODIUM AND SULBACTAM SODIUM 3 G: 2; 1 INJECTION, POWDER, FOR SOLUTION INTRAMUSCULAR; INTRAVENOUS at 12:13

## 2023-03-22 RX ADMIN — Medication 10 ML: at 21:13

## 2023-03-22 RX ADMIN — DULOXETINE HYDROCHLORIDE 60 MG: 60 CAPSULE, DELAYED RELEASE ORAL at 08:13

## 2023-03-22 RX ADMIN — SUCRALFATE 1 G: 1 TABLET ORAL at 08:12

## 2023-03-22 RX ADMIN — PANTOPRAZOLE SODIUM 40 MG: 40 TABLET, DELAYED RELEASE ORAL at 21:12

## 2023-03-22 RX ADMIN — EMPAGLIFLOZIN 10 MG: 10 TABLET, FILM COATED ORAL at 08:13

## 2023-03-22 RX ADMIN — SPIRONOLACTONE 25 MG: 25 TABLET ORAL at 08:13

## 2023-03-22 RX ADMIN — BACLOFEN 30 MG: 10 TABLET ORAL at 21:12

## 2023-03-22 RX ADMIN — GABAPENTIN 800 MG: 400 CAPSULE ORAL at 21:11

## 2023-03-22 RX ADMIN — BACLOFEN 30 MG: 10 TABLET ORAL at 17:00

## 2023-03-22 RX ADMIN — VITAMINS A AND D OINTMENT 1 APPLICATION: 15.5; 53.4 OINTMENT TOPICAL at 21:12

## 2023-03-22 RX ADMIN — MODAFINIL 200 MG: 100 TABLET ORAL at 08:13

## 2023-03-22 RX ADMIN — DULOXETINE HYDROCHLORIDE 60 MG: 60 CAPSULE, DELAYED RELEASE ORAL at 21:12

## 2023-03-22 RX ADMIN — FERROUS SULFATE TAB 325 MG (65 MG ELEMENTAL FE) 325 MG: 325 (65 FE) TAB at 08:14

## 2023-03-22 RX ADMIN — SACUBITRIL AND VALSARTAN 1 TABLET: 24; 26 TABLET, FILM COATED ORAL at 21:11

## 2023-03-22 RX ADMIN — ATORVASTATIN CALCIUM 10 MG: 10 TABLET, FILM COATED ORAL at 21:12

## 2023-03-22 RX ADMIN — APIXABAN 5 MG: 5 TABLET, FILM COATED ORAL at 08:13

## 2023-03-22 RX ADMIN — Medication 1 CAPSULE: at 08:14

## 2023-03-22 RX ADMIN — INSULIN ASPART 60 UNITS: 100 INJECTION, SOLUTION INTRAVENOUS; SUBCUTANEOUS at 12:13

## 2023-03-22 RX ADMIN — INSULIN DETEMIR 100 UNITS: 100 INJECTION, SOLUTION SUBCUTANEOUS at 08:11

## 2023-03-22 RX ADMIN — OXYCODONE HYDROCHLORIDE AND ACETAMINOPHEN 500 MG: 500 TABLET ORAL at 08:13

## 2023-03-22 RX ADMIN — GABAPENTIN 800 MG: 400 CAPSULE ORAL at 15:45

## 2023-03-22 RX ADMIN — VITAMINS A AND D OINTMENT 1 APPLICATION: 15.5; 53.4 OINTMENT TOPICAL at 08:11

## 2023-03-22 RX ADMIN — BUMETANIDE 2 MG: 1 TABLET ORAL at 08:13

## 2023-03-22 RX ADMIN — AMPICILLIN SODIUM AND SULBACTAM SODIUM 3 G: 2; 1 INJECTION, POWDER, FOR SOLUTION INTRAMUSCULAR; INTRAVENOUS at 17:00

## 2023-03-22 RX ADMIN — INSULIN ASPART 123 UNITS: 100 INJECTION, SOLUTION INTRAVENOUS; SUBCUTANEOUS at 08:10

## 2023-03-22 RX ADMIN — PANTOPRAZOLE SODIUM 40 MG: 40 TABLET, DELAYED RELEASE ORAL at 08:13

## 2023-03-22 RX ADMIN — METFORMIN HYDROCHLORIDE 1000 MG: 500 TABLET ORAL at 17:00

## 2023-03-22 RX ADMIN — AMPICILLIN SODIUM AND SULBACTAM SODIUM 3 G: 2; 1 INJECTION, POWDER, FOR SOLUTION INTRAMUSCULAR; INTRAVENOUS at 05:12

## 2023-03-22 RX ADMIN — DICYCLOMINE HYDROCHLORIDE 10 MG: 10 CAPSULE ORAL at 08:12

## 2023-03-22 RX ADMIN — INSULIN ASPART 138 UNITS: 100 INJECTION, SOLUTION INTRAVENOUS; SUBCUTANEOUS at 17:00

## 2023-03-22 RX ADMIN — METFORMIN HYDROCHLORIDE 1000 MG: 500 TABLET ORAL at 08:13

## 2023-03-22 RX ADMIN — INSULIN DETEMIR 100 UNITS: 100 INJECTION, SOLUTION SUBCUTANEOUS at 21:08

## 2023-03-22 RX ADMIN — BACLOFEN 30 MG: 10 TABLET ORAL at 12:14

## 2023-03-22 RX ADMIN — AMPICILLIN SODIUM AND SULBACTAM SODIUM 3 G: 2; 1 INJECTION, POWDER, FOR SOLUTION INTRAMUSCULAR; INTRAVENOUS at 23:37

## 2023-03-22 RX ADMIN — MAGNESIUM GLUCONATE 500 MG ORAL TABLET 1200 MG: 500 TABLET ORAL at 08:12

## 2023-03-22 RX ADMIN — APIXABAN 5 MG: 5 TABLET, FILM COATED ORAL at 21:12

## 2023-03-22 RX ADMIN — NYSTATIN: 100000 POWDER TOPICAL at 08:12

## 2023-03-22 RX ADMIN — DAKIN'S SOLUTION 0.125% (QUARTER STRENGTH): 0.12 SOLUTION at 03:23

## 2023-03-22 RX ADMIN — GABAPENTIN 800 MG: 400 CAPSULE ORAL at 08:16

## 2023-03-22 RX ADMIN — BACLOFEN 30 MG: 10 TABLET ORAL at 08:13

## 2023-03-22 RX ADMIN — ARIPIPRAZOLE 10 MG: 10 TABLET ORAL at 21:11

## 2023-03-22 RX ADMIN — DAKIN'S SOLUTION 0.125% (QUARTER STRENGTH): 0.12 SOLUTION at 14:26
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
## 2023-03-22 NOTE — CASE MANAGEMENT/SOCIAL WORK
Patient approved for additional swing bed days with clinical updates due 3/28/23. Provider and SS notified via secure chat.

## 2023-03-22 NOTE — PLAN OF CARE
Goal Outcome Evaluation:              Outcome Evaluation: Pt repositioned in bed every two hours and tolerating well. IV ABX administered as ordered. Will continue with plan of care.

## 2023-03-22 NOTE — PLAN OF CARE
Goal Outcome Evaluation:   Pt has rested in bed this shift. No complaints of pain. Wound care done per orders. VSS. No further requests at this time. Will continue with plan of care.

## 2023-03-22 NOTE — PROGRESS NOTES
Nutrition Services    Patient Name:  Ken Krueger  YOB: 1975  MRN: [id number]  Admit Date:  2/21/2023    BMI: 42.34  Diet: Regular, Consistent Carb, high protein  Supplement: Thanh BID  Intake: 83% x 12 meals  Fluid: not meeting needs  Encourage intake     Electronically signed by:  Karin Aceves RD  03/22/23 15:24 EDT

## 2023-03-22 NOTE — CASE MANAGEMENT/SOCIAL WORK
Discharge Planning Assessment   Fabricio     Patient Name: Ken Krueger  MRN: [id number]  Today's Date: 3/22/2023    Admit Date: 2/21/2023    Plan: SS was notified on this date by Swing Bed Helder CHRISTIE. Pt was approved  for additional swing bed days with clinical updates due 3/28/23. SS to follow     Discharge Plan     Row Name 03/22/23 0933       Plan    Plan SS was notified on this date by Swing Bed Helder CHRISTIE. Pt was approved  for additional swing bed days with clinical updates due 3/28/23. SS to follow                  HUMPHREY Loya

## 2023-03-22 NOTE — PROGRESS NOTES
PROGRESS NOTE         Patient Identification:  Name:  Ken Krueger  Age:  47 y.o.  Sex:  male  :  1975  MRN:  [id number]  Visit Number:  [id number]  Primary Care Provider:  Franchesca Hodges APRN         LOS: 29 days       ----------------------------------------------------------------------------------------------------------------------  Subjective       Chief Complaints:    No chief complaint on file.        Interval History:      Patient on room air this morning.  Just finished eating breakfast.  Overall doing well.  No issues or complaints.  Lungs clear to auscultation bilaterally.  Abdomen soft, nontender.  Reports no increase in ostomy output.  Afebrile.  Wound culture from 3/17/2023 finalized as pan susceptible Enterococcus faecalis and pan susceptible Enterococcus avium.    Review of Systems:    Constitutional: No fever, chills. No night sweats. No appetite change or unexpected weight change. No fatigue.  Eyes: no eye drainage, itching or redness.  HEENT: no mouth sores, dysphagia or nose bleed.  Respiratory: no for shortness of breath, cough or production of sputum.  Cardiovascular: no chest pain, no palpitations, no orthopnea.  Gastrointestinal: no nausea, vomiting or diarrhea. No abdominal pain, hematemesis or rectal bleeding.  Genitourinary: no dysuria or polyuria.  Hematologic/lymphatic: no lymph node abnormalities, no easy bruising or easy bleeding.  Musculoskeletal: no muscle or joint pain.  Skin: No rash and no itching.  Neurological: no loss of consciousness, no seizure, no headache.  Behavioral/Psych: no depression or suicidal ideation.  Endocrine: no hot flashes.  Immunologic: negative.    ----------------------------------------------------------------------------------------------------------------------      Objective       Current Hospital Meds:    Pharmacy Consult - Pharmacy to dose,        ----------------------------------------------------------------------------------------------------------------------    Vital Signs:  Temp:  [97.8 °F (36.6 °C)-98 °F (36.7 °C)] 97.8 °F (36.6 °C)  Heart Rate:  [70-88] 88  Resp:  [18-24] 24  BP: (102-129)/(56-64) 102/56  Mean Arterial Pressure (Non-Invasive) for the past 24 hrs (Last 3 readings):   Noninvasive MAP (mmHg)   03/22/23 0500 74     SpO2 Percentage    03/20/23 0835 03/21/23 1900 03/22/23 0500   SpO2: 95% 96% 98%     SpO2:  [96 %-98 %] 98 %  on   ;   Device (Oxygen Therapy): room air    Body mass index is 42.34 kg/m².  Wt Readings from Last 3 Encounters:   02/21/23 (!) 138 kg (303 lb 9.2 oz)   02/20/23 (!) 139 kg (306 lb 14.1 oz)   12/13/22 (!) 141 kg (310 lb 14.4 oz)        Intake/Output Summary (Last 24 hours) at 3/22/2023 0906  Last data filed at 3/22/2023 0558  Gross per 24 hour   Intake 562.24 ml   Output 4625 ml   Net -4062.76 ml     Diet: Diabetic Diets; Consistent Carbohydrate; Texture: Regular Texture (IDDSI 7); Fluid Consistency: Thin (IDDSI 0)  ----------------------------------------------------------------------------------------------------------------------      Physical Exam:    Constitutional: Morbidly obese  male is resting in bed currently on room air with no apparent distress.   HENT:  Head: Normocephalic and atraumatic.  Mouth:  Moist mucous membranes.    Eyes:  Conjunctivae and EOM are normal.  No scleral icterus.  Neck:  Neck supple.  No JVD present.    Cardiovascular:  Normal rate, regular rhythm and normal heart sounds with no murmur. No edema.  Pulmonary/Chest:  No respiratory distress, no wheezes, no crackles, with normal breath sounds and good air movement.  Lungs clear to auscultation bilaterally  Abdominal:  Morbidly obese.  Soft.  Bowel sounds are normal.  No distension and no tenderness.  Ileal conduit in place.  Colostomy bag in place with soft stool.  Musculoskeletal:  No tenderness.  No swelling or redness  of joints.  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 PICC to right upper arm with no signs of infection.  Neurological:  Alert and oriented to person, place, and time.  No facial droop.  No slurred speech.   Skin:  Stage IV sacral decubitus wound.  Dressing in place.   Psychiatric:  Normal mood and affect.  Behavior is normal.      ----------------------------------------------------------------------------------------------------------------------            Results from last 7 days   Lab Units 03/20/23  0423 03/16/23  0427   CRP mg/dL  --  6.43*   WBC 10*3/mm3 10.42 10.84*   HEMOGLOBIN g/dL 11.1* 11.3*   HEMATOCRIT % 38.3 38.5   MCV fL 89.3 88.9   MCHC g/dL 29.0* 29.4*   PLATELETS 10*3/mm3 305 291     Results from last 7 days   Lab Units 03/20/23  1927 03/20/23 0423 03/16/23  2106   SODIUM mmol/L  --  135*  --    POTASSIUM mmol/L 4.3 3.6  --    CHLORIDE mmol/L  --  99  --    CO2 mmol/L  --  21.8*  --    BUN mg/dL  --  53*  --    CREATININE mg/dL  --  1.07  --    CALCIUM mg/dL  --  9.5  --    GLUCOSE mg/dL  --  292* 243*   Estimated Creatinine Clearance: 123.3 mL/min (by C-G formula based on SCr of 1.07 mg/dL).  No results found for: AMMONIA    Glucose   Date/Time Value Ref Range Status   03/22/2023 0728 197 (H) 70 - 130 mg/dL Final     Comment:     Meter: [id number] : [id number] SUNIL VALLADARES   03/21/2023 2028 143 (H) 70 - 130 mg/dL Final     Comment:     Meter: [id number] : [id number] JULIANNA NIETO   03/21/2023 1643 138 (H) 70 - 130 mg/dL Final     Comment:     Meter: [id number] : [id number] karlos arthur   03/21/2023 1148 188 (H) 70 - 130 mg/dL Final     Comment:     Meter: [id number] : [id number] karlos arthur   03/21/2023 0727 149 (H) 70 - 130 mg/dL Final     Comment:     Meter: [id number] : [id number] karlos arthur   03/20/2023 2105 249 (H) 70 - 130 mg/dL Final     Comment:     Meter: [id number] : [id number] kathy wagner   03/20/2023 1646 132 (H) 70 - 130 mg/dL Final     Comment:     Meter:  [id number] : [id number] karlos arthur   03/20/2023 1139 199 (H) 70 - 130 mg/dL Final     Comment:     Meter: [id number] : [id number] karlos arthur     Lab Results   Component Value Date    HGBA1C 11.50 (H) 02/13/2023     Lab Results   Component Value Date    TSH 3.780 07/19/2022    FREET4 1.31 06/02/2021       Blood Culture   Date Value Ref Range Status   02/12/2023 No growth at 24 hours  Preliminary   02/12/2023 No growth at 24 hours  Preliminary     No results found for: URINECX  No results found for: WOUNDCX  No results found for: STOOLCX  No results found for: RESPCX  Pain Management Panel     Pain Management Panel Latest Ref Rng & Units 6/2/2021 8/19/2018    AMPHETAMINES SCREEN, URINE Negative Negative Negative    BARBITURATES SCREEN Negative Negative Negative    BENZODIAZEPINE SCREEN, URINE Negative Negative Negative    BUPRENORPHINEUR Negative Negative Negative    COCAINE SCREEN, URINE Negative Negative Negative    METHADONE SCREEN, URINE Negative Negative Negative            ----------------------------------------------------------------------------------------------------------------------  Imaging Results (Last 24 Hours)     ** No results found for the last 24 hours. **          -----------------------------------------------------------------------------------  Pertinent Infectious Disease Results     CT abdomen pelvis with contrast on 2/12/2023 showed there is a new decubitus ulcer just to the right of midline at the level of the coccyx with constellation of imaging findings most characteristic of acute infection/osteomyelitis. Small pocket of air and fluid adjacent to the coccyx measuring 3.7 x 4.1 cm could reflect phlegmon or abscess. Chronic bilateral decubitus ulcers at the level of the ischial tuberosities bilaterally with imaging findings suggestive of chronic infection/osteomyelitis, similar to prior. Hepatic steatosis and hepatomegaly with borderline splenomegaly. Nonobstructing left  renal stones. Postoperative changes of left lower quadrant and colostomy and right mid abdominal ileal conduit formation. Diverticulosis. No diverticulitis or bowel obstruction. COVID-19 and flu A/B PCR on 2/12/2023 was negative.    Status post excisional debridement of sacral ulcer on 2/13/2023 with Dr. Taylor.    Status post debridement with deep tissue culture with wound care APRN Cathie Handy on 3/17/2023.      Assessment/Plan         ASSESSMENT:    Septic shock with lactic acid greater than 4 on admission  Acute and chronic decubitus ulcerations with osteomyelitis and possible abscess      PLAN:    Patient on room air this morning.  Just finished eating breakfast.  Overall doing well.  No issues or complaints.  Lungs clear to auscultation bilaterally.  Abdomen soft, nontender.  Reports no increase in ostomy output.  Afebrile.  Wound culture from 3/17/2023 finalized as pan susceptible Enterococcus faecalis and pan susceptible Enterococcus avium.    We recommend to continue Unasyn 3 g IV every 6 hours through 4/3/2023 for treatment of Enterococcus sacral wound infection.  We will continue to follow closely and adjust antibiotic therapy as needed.    Code Status:   Code Status and Medical Interventions:   Ordered at: 02/21/23 1034     Code Status (Patient has no pulse and is not breathing):    CPR (Attempt to Resuscitate)     Medical Interventions (Patient has pulse or is breathing):    Full Support       GUANACO Flores  03/22/23  09:06 EDT

## 2023-03-23 LAB
ALBUMIN SERPL-MCNC: 3.2 G/DL (ref 3.5–5.2)
ALBUMIN/GLOB SERPL: 0.7 G/DL
ALP SERPL-CCNC: 133 U/L (ref 39–117)
ALT SERPL W P-5'-P-CCNC: 46 U/L (ref 1–41)
ANION GAP SERPL CALCULATED.3IONS-SCNC: 12.3 MMOL/L (ref 5–15)
ANISOCYTOSIS BLD QL: NORMAL
AST SERPL-CCNC: 33 U/L (ref 1–40)
BASOPHILS # BLD AUTO: 0.04 10*3/MM3 (ref 0–0.2)
BASOPHILS NFR BLD AUTO: 0.4 % (ref 0–1.5)
BILIRUB SERPL-MCNC: <0.2 MG/DL (ref 0–1.2)
BUN SERPL-MCNC: 49 MG/DL (ref 6–20)
BUN/CREAT SERPL: 51.6 (ref 7–25)
CALCIUM SPEC-SCNC: 9.3 MG/DL (ref 8.6–10.5)
CHLORIDE SERPL-SCNC: 102 MMOL/L (ref 98–107)
CO2 SERPL-SCNC: 22.7 MMOL/L (ref 22–29)
CREAT SERPL-MCNC: 0.95 MG/DL (ref 0.76–1.27)
DACRYOCYTES BLD QL SMEAR: NORMAL
DEPRECATED RDW RBC AUTO: 47.8 FL (ref 37–54)
EGFRCR SERPLBLD CKD-EPI 2021: 100.6 ML/MIN/1.73
EOSINOPHIL # BLD AUTO: 0.15 10*3/MM3 (ref 0–0.4)
EOSINOPHIL NFR BLD AUTO: 1.4 % (ref 0.3–6.2)
ERYTHROCYTE [DISTWIDTH] IN BLOOD BY AUTOMATED COUNT: 14.6 % (ref 12.3–15.4)
GLOBULIN UR ELPH-MCNC: 4.9 GM/DL
GLUCOSE BLDC GLUCOMTR-MCNC: 163 MG/DL (ref 70–130)
GLUCOSE BLDC GLUCOMTR-MCNC: 182 MG/DL (ref 70–130)
GLUCOSE BLDC GLUCOMTR-MCNC: 207 MG/DL (ref 70–130)
GLUCOSE BLDC GLUCOMTR-MCNC: 238 MG/DL (ref 70–130)
GLUCOSE SERPL-MCNC: 253 MG/DL (ref 65–99)
HCT VFR BLD AUTO: 38.4 % (ref 37.5–51)
HGB BLD-MCNC: 11 G/DL (ref 13–17.7)
HYPOCHROMIA BLD QL: NORMAL
IMM GRANULOCYTES # BLD AUTO: 0.07 10*3/MM3 (ref 0–0.05)
IMM GRANULOCYTES NFR BLD AUTO: 0.7 % (ref 0–0.5)
LYMPHOCYTES # BLD AUTO: 1.88 10*3/MM3 (ref 0.7–3.1)
LYMPHOCYTES NFR BLD AUTO: 17.9 % (ref 19.6–45.3)
MCH RBC QN AUTO: 25.8 PG (ref 26.6–33)
MCHC RBC AUTO-ENTMCNC: 28.6 G/DL (ref 31.5–35.7)
MCV RBC AUTO: 89.9 FL (ref 79–97)
MONOCYTES # BLD AUTO: 0.4 10*3/MM3 (ref 0.1–0.9)
MONOCYTES NFR BLD AUTO: 3.8 % (ref 5–12)
NEUTROPHILS NFR BLD AUTO: 7.98 10*3/MM3 (ref 1.7–7)
NEUTROPHILS NFR BLD AUTO: 75.8 % (ref 42.7–76)
NRBC BLD AUTO-RTO: 0 /100 WBC (ref 0–0.2)
PLAT MORPH BLD: NORMAL
PLATELET # BLD AUTO: 323 10*3/MM3 (ref 140–450)
PMV BLD AUTO: 9.8 FL (ref 6–12)
POLYCHROMASIA BLD QL SMEAR: NORMAL
POTASSIUM SERPL-SCNC: 4 MMOL/L (ref 3.5–5.2)
PROT SERPL-MCNC: 8.1 G/DL (ref 6–8.5)
RBC # BLD AUTO: 4.27 10*6/MM3 (ref 4.14–5.8)
SODIUM SERPL-SCNC: 137 MMOL/L (ref 136–145)
WBC NRBC COR # BLD: 10.52 10*3/MM3 (ref 3.4–10.8)

## 2023-03-23 PROCEDURE — 25010000002 AMPICILLIN-SULBACTAM PER 1.5 G: Performed by: INTERNAL MEDICINE

## 2023-03-23 PROCEDURE — 63710000001 INSULIN ASPART PER 5 UNITS: Performed by: HOSPITALIST

## 2023-03-23 PROCEDURE — 85007 BL SMEAR W/DIFF WBC COUNT: CPT | Performed by: INTERNAL MEDICINE

## 2023-03-23 PROCEDURE — 82962 GLUCOSE BLOOD TEST: CPT

## 2023-03-23 PROCEDURE — 63710000001 INSULIN DETEMIR PER 5 UNITS: Performed by: HOSPITALIST

## 2023-03-23 PROCEDURE — 99308 SBSQ NF CARE LOW MDM 20: CPT | Performed by: PHYSICIAN ASSISTANT

## 2023-03-23 PROCEDURE — 80053 COMPREHEN METABOLIC PANEL: CPT | Performed by: INTERNAL MEDICINE

## 2023-03-23 PROCEDURE — 85025 COMPLETE CBC W/AUTO DIFF WBC: CPT | Performed by: INTERNAL MEDICINE

## 2023-03-23 RX ADMIN — GABAPENTIN 800 MG: 400 CAPSULE ORAL at 16:42

## 2023-03-23 RX ADMIN — PANTOPRAZOLE SODIUM 40 MG: 40 TABLET, DELAYED RELEASE ORAL at 08:50

## 2023-03-23 RX ADMIN — Medication 1 CAPSULE: at 08:50

## 2023-03-23 RX ADMIN — DAKIN'S SOLUTION 0.125% (QUARTER STRENGTH): 0.12 SOLUTION at 16:42

## 2023-03-23 RX ADMIN — APIXABAN 5 MG: 5 TABLET, FILM COATED ORAL at 08:50

## 2023-03-23 RX ADMIN — DICYCLOMINE HYDROCHLORIDE 10 MG: 10 CAPSULE ORAL at 08:50

## 2023-03-23 RX ADMIN — EMPAGLIFLOZIN 10 MG: 10 TABLET, FILM COATED ORAL at 08:51

## 2023-03-23 RX ADMIN — INSULIN ASPART 126 UNITS: 100 INJECTION, SOLUTION INTRAVENOUS; SUBCUTANEOUS at 08:47

## 2023-03-23 RX ADMIN — INSULIN DETEMIR 110 UNITS: 100 INJECTION, SOLUTION SUBCUTANEOUS at 20:15

## 2023-03-23 RX ADMIN — VITAMINS A AND D OINTMENT 1 APPLICATION: 15.5; 53.4 OINTMENT TOPICAL at 10:09

## 2023-03-23 RX ADMIN — AMPICILLIN SODIUM AND SULBACTAM SODIUM 3 G: 2; 1 INJECTION, POWDER, FOR SOLUTION INTRAMUSCULAR; INTRAVENOUS at 05:17

## 2023-03-23 RX ADMIN — NYSTATIN: 100000 POWDER TOPICAL at 10:09

## 2023-03-23 RX ADMIN — SPIRONOLACTONE 25 MG: 25 TABLET ORAL at 08:51

## 2023-03-23 RX ADMIN — OXYCODONE HYDROCHLORIDE AND ACETAMINOPHEN 500 MG: 500 TABLET ORAL at 08:51

## 2023-03-23 RX ADMIN — INSULIN ASPART 75 UNITS: 100 INJECTION, SOLUTION INTRAVENOUS; SUBCUTANEOUS at 17:36

## 2023-03-23 RX ADMIN — BUMETANIDE 2 MG: 1 TABLET ORAL at 08:51

## 2023-03-23 RX ADMIN — PANTOPRAZOLE SODIUM 40 MG: 40 TABLET, DELAYED RELEASE ORAL at 20:15

## 2023-03-23 RX ADMIN — Medication 1 TABLET: at 08:51

## 2023-03-23 RX ADMIN — VITAMINS A AND D OINTMENT 1 APPLICATION: 15.5; 53.4 OINTMENT TOPICAL at 20:18

## 2023-03-23 RX ADMIN — CARVEDILOL 12.5 MG: 6.25 TABLET, FILM COATED ORAL at 08:49

## 2023-03-23 RX ADMIN — SUCRALFATE 1 G: 1 TABLET ORAL at 10:10

## 2023-03-23 RX ADMIN — BACLOFEN 30 MG: 10 TABLET ORAL at 11:44

## 2023-03-23 RX ADMIN — MODAFINIL 200 MG: 100 TABLET ORAL at 10:13

## 2023-03-23 RX ADMIN — GABAPENTIN 800 MG: 400 CAPSULE ORAL at 20:15

## 2023-03-23 RX ADMIN — APIXABAN 5 MG: 5 TABLET, FILM COATED ORAL at 20:15

## 2023-03-23 RX ADMIN — ATORVASTATIN CALCIUM 10 MG: 10 TABLET, FILM COATED ORAL at 20:15

## 2023-03-23 RX ADMIN — INSULIN ASPART 43 UNITS: 100 INJECTION, SOLUTION INTRAVENOUS; SUBCUTANEOUS at 20:15

## 2023-03-23 RX ADMIN — SACUBITRIL AND VALSARTAN 1 TABLET: 24; 26 TABLET, FILM COATED ORAL at 08:49

## 2023-03-23 RX ADMIN — BACLOFEN 30 MG: 10 TABLET ORAL at 20:14

## 2023-03-23 RX ADMIN — Medication 1000 UNITS: at 08:50

## 2023-03-23 RX ADMIN — AMPICILLIN SODIUM AND SULBACTAM SODIUM 3 G: 2; 1 INJECTION, POWDER, FOR SOLUTION INTRAMUSCULAR; INTRAVENOUS at 11:46

## 2023-03-23 RX ADMIN — DULOXETINE HYDROCHLORIDE 60 MG: 60 CAPSULE, DELAYED RELEASE ORAL at 08:50

## 2023-03-23 RX ADMIN — ARIPIPRAZOLE 10 MG: 10 TABLET ORAL at 20:15

## 2023-03-23 RX ADMIN — CARVEDILOL 12.5 MG: 6.25 TABLET, FILM COATED ORAL at 17:39

## 2023-03-23 RX ADMIN — SACUBITRIL AND VALSARTAN 1 TABLET: 24; 26 TABLET, FILM COATED ORAL at 20:15

## 2023-03-23 RX ADMIN — AMPICILLIN SODIUM AND SULBACTAM SODIUM 3 G: 2; 1 INJECTION, POWDER, FOR SOLUTION INTRAMUSCULAR; INTRAVENOUS at 17:39

## 2023-03-23 RX ADMIN — Medication 10 ML: at 20:17

## 2023-03-23 RX ADMIN — NYSTATIN: 100000 POWDER TOPICAL at 20:18

## 2023-03-23 RX ADMIN — INSULIN ASPART 46 UNITS: 100 INJECTION, SOLUTION INTRAVENOUS; SUBCUTANEOUS at 12:53

## 2023-03-23 RX ADMIN — BACLOFEN 30 MG: 10 TABLET ORAL at 17:38

## 2023-03-23 RX ADMIN — Medication 10 ML: at 08:51

## 2023-03-23 RX ADMIN — MAGNESIUM GLUCONATE 500 MG ORAL TABLET 1200 MG: 500 TABLET ORAL at 08:50

## 2023-03-23 RX ADMIN — METFORMIN HYDROCHLORIDE 1000 MG: 500 TABLET ORAL at 17:38

## 2023-03-23 RX ADMIN — AMPICILLIN SODIUM AND SULBACTAM SODIUM 3 G: 2; 1 INJECTION, POWDER, FOR SOLUTION INTRAMUSCULAR; INTRAVENOUS at 22:21

## 2023-03-23 RX ADMIN — INSULIN DETEMIR 110 UNITS: 100 INJECTION, SOLUTION SUBCUTANEOUS at 08:48

## 2023-03-23 RX ADMIN — FERROUS SULFATE TAB 325 MG (65 MG ELEMENTAL FE) 325 MG: 325 (65 FE) TAB at 08:50

## 2023-03-23 RX ADMIN — METFORMIN HYDROCHLORIDE 1000 MG: 500 TABLET ORAL at 08:49

## 2023-03-23 RX ADMIN — GABAPENTIN 800 MG: 400 CAPSULE ORAL at 08:50

## 2023-03-23 RX ADMIN — DAKIN'S SOLUTION 0.125% (QUARTER STRENGTH): 0.12 SOLUTION at 03:00

## 2023-03-23 RX ADMIN — BACLOFEN 30 MG: 10 TABLET ORAL at 08:51

## 2023-03-23 RX ADMIN — DICYCLOMINE HYDROCHLORIDE 10 MG: 10 CAPSULE ORAL at 20:15

## 2023-03-23 RX ADMIN — DULOXETINE HYDROCHLORIDE 60 MG: 60 CAPSULE, DELAYED RELEASE ORAL at 20:15

## 2023-03-23 NOTE — PLAN OF CARE
Goal Outcome Evaluation:  Plan of Care Reviewed With: patient        Progress: no change  Outcome Evaluation: Pt has rested in bed overnight. VSS. Wound care completed per orders. No acute changes overnight. Will continue to follow plan of care.
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
## 2023-03-23 NOTE — PROGRESS NOTES
PROGRESS NOTE         Patient Identification:  Name:  Ken Krueger  Age:  47 y.o.  Sex:  male  :  1975  MRN:  [id number]  Visit Number:  [id number]  Primary Care Provider:  Franchesca Hodges APRN         LOS: 30 days       ----------------------------------------------------------------------------------------------------------------------  Subjective       Chief Complaints:    No chief complaint on file.        Interval History:      Patient is sitting up in bed on room air in no apparent distress.  Reports a recent history of diarrhea, which has resolved.  Denies any particular complaints today.  Lungs are clear to auscultation bilaterally.  Abdomen is distended but nontender, normal bowel sounds.  Afebrile, no diarrhea.  WBC remains normal at 10.52.  Respiratory panel on 3/22/2023 was negative.  Chest x-ray on 3/22/2023 was unremarkable.    Review of Systems:    Constitutional: No fever, chills. No night sweats. No appetite change or unexpected weight change. No fatigue.  Eyes: no eye drainage, itching or redness.  HEENT: no mouth sores, dysphagia or nose bleed.  Respiratory: no for shortness of breath, cough or production of sputum.  Cardiovascular: no chest pain, no palpitations, no orthopnea.  Gastrointestinal: no nausea, vomiting or diarrhea. No abdominal pain, hematemesis or rectal bleeding.  Genitourinary: no dysuria or polyuria.  Hematologic/lymphatic: no lymph node abnormalities, no easy bruising or easy bleeding.  Musculoskeletal: no muscle or joint pain.  Skin: No rash and no itching.  Neurological: no loss of consciousness, no seizure, no headache.  Behavioral/Psych: no depression or suicidal ideation.  Endocrine: no hot flashes.  Immunologic: negative.    ----------------------------------------------------------------------------------------------------------------------      Objective       Current Hospital Meds:    Pharmacy Consult - Pharmacy to dose,        ----------------------------------------------------------------------------------------------------------------------    Vital Signs:  Temp:  [97.8 °F (36.6 °C)-98.2 °F (36.8 °C)] 97.8 °F (36.6 °C)  Heart Rate:  [84-88] 84  Resp:  [16-18] 16  BP: ()/(54-68) 98/54  No data found.  SpO2 Percentage    03/20/23 0835 03/21/23 1900 03/22/23 0500   SpO2: 95% 96% 98%        on   ;   Device (Oxygen Therapy): room air    Body mass index is 42.34 kg/m².  Wt Readings from Last 3 Encounters:   02/21/23 (!) 138 kg (303 lb 9.2 oz)   02/20/23 (!) 139 kg (306 lb 14.1 oz)   12/13/22 (!) 141 kg (310 lb 14.4 oz)        Intake/Output Summary (Last 24 hours) at 3/23/2023 1038  Last data filed at 3/23/2023 0900  Gross per 24 hour   Intake 3022.23 ml   Output 6850 ml   Net -3827.77 ml     Diet: Diabetic Diets; Consistent Carbohydrate; Texture: Regular Texture (IDDSI 7); Fluid Consistency: Thin (IDDSI 0)  ----------------------------------------------------------------------------------------------------------------------      Physical Exam:    Constitutional: Morbidly obese  male is sitting up in bed on room air in no apparent distress.  HENT:  Head: Normocephalic and atraumatic.  Mouth:  Moist mucous membranes.    Eyes:  Conjunctivae and EOM are normal.  No scleral icterus.  Neck:  Neck supple.  No JVD present.    Cardiovascular:  Normal rate, regular rhythm and normal heart sounds with no murmur. No edema.  Pulmonary/Chest:  No respiratory distress, no wheezes, no crackles, with normal breath sounds and good air movement.  Lungs clear to auscultation bilaterally  Abdominal:  Morbidly obese.  Soft.  Bowel sounds are normal.  No distension and no tenderness.  Ileal conduit in place.  Colostomy bag in place with soft stool.  Musculoskeletal:  No tenderness.  No swelling or redness of joints.  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 PICC to right upper arm with no signs of infection.  Neurological:  Alert and oriented to person, place,  and time.  No facial droop.  No slurred speech.   Skin:  Stage IV sacral decubitus wound.  Dressing in place.   Psychiatric:  Normal mood and affect.  Behavior is normal.      ----------------------------------------------------------------------------------------------------------------------            Results from last 7 days   Lab Units 03/23/23 0117 03/20/23 0423   WBC 10*3/mm3 10.52 10.42   HEMOGLOBIN g/dL 11.0* 11.1*   HEMATOCRIT % 38.4 38.3   MCV fL 89.9 89.3   MCHC g/dL 28.6* 29.0*   PLATELETS 10*3/mm3 323 305     Results from last 7 days   Lab Units 03/23/23  0117 03/20/23  1927 03/20/23 0423 03/16/23  2106   SODIUM mmol/L 137  --  135*  --    POTASSIUM mmol/L 4.0 4.3 3.6  --    CHLORIDE mmol/L 102  --  99  --    CO2 mmol/L 22.7  --  21.8*  --    BUN mg/dL 49*  --  53*  --    CREATININE mg/dL 0.95  --  1.07  --    CALCIUM mg/dL 9.3  --  9.5  --    GLUCOSE mg/dL 253*  --  292* 243*   ALBUMIN g/dL 3.2*  --   --   --    BILIRUBIN mg/dL <0.2  --   --   --    ALK PHOS U/L 133*  --   --   --    AST (SGOT) U/L 33  --   --   --    ALT (SGPT) U/L 46*  --   --   --    Estimated Creatinine Clearance: 138.9 mL/min (by C-G formula based on SCr of 0.95 mg/dL).  No results found for: AMMONIA    Glucose   Date/Time Value Ref Range Status   03/23/2023 0747 207 (H) 70 - 130 mg/dL Final     Comment:     Meter: [id number] : [id number] NEIL DE LA CRUZH   03/22/2023 2055 122 70 - 130 mg/dL Final     Comment:     Meter: [id number] : [id number] SIMONE MCADAMSBARD   03/22/2023 1632 216 (H) 70 - 130 mg/dL Final     Comment:     Meter: [id number] : [id number] SUNIL VALLADARES   03/22/2023 1146 189 (H) 70 - 130 mg/dL Final     Comment:     Meter: [id number] : [id number] SUNIL VALLADARES   03/22/2023 0728 197 (H) 70 - 130 mg/dL Final     Comment:     Meter: [id number] : [id number] SUNIL VALLADARES   03/21/2023 2028 143 (H) 70 - 130 mg/dL Final     Comment:     Meter: [id number] : [id number] JULIANNA NIETO   03/21/2023 1643 138  (H) 70 - 130 mg/dL Final     Comment:     Meter: [id number] : [id number] karlos arthur   03/21/2023 1148 188 (H) 70 - 130 mg/dL Final     Comment:     Meter: [id number] : [id number] karlos arthur     Lab Results   Component Value Date    HGBA1C 11.50 (H) 02/13/2023     Lab Results   Component Value Date    TSH 3.780 07/19/2022    FREET4 1.31 06/02/2021       Blood Culture   Date Value Ref Range Status   02/12/2023 No growth at 24 hours  Preliminary   02/12/2023 No growth at 24 hours  Preliminary     No results found for: URINECX  No results found for: WOUNDCX  No results found for: STOOLCX  No results found for: RESPCX  Pain Management Panel       Pain Management Panel Latest Ref Rng & Units 6/2/2021 8/19/2018    AMPHETAMINES SCREEN, URINE Negative Negative Negative    BARBITURATES SCREEN Negative Negative Negative    BENZODIAZEPINE SCREEN, URINE Negative Negative Negative    BUPRENORPHINEUR Negative Negative Negative    COCAINE SCREEN, URINE Negative Negative Negative    METHADONE SCREEN, URINE Negative Negative Negative              ----------------------------------------------------------------------------------------------------------------------  Imaging Results (Last 24 Hours)       Procedure Component Value Units Date/Time    XR Chest 1 View [[id number]] Collected: 03/22/23 1953     Updated: 03/22/23 1955    Narrative:      CR Chest 1 Vw    INDICATION:   Productive cough today     COMPARISON:    922    FINDINGS:  Portable AP view(s) of the chest.    PIC catheter has its tip in the right atrium. Lungs are clear. Heart size is within normal limits. There are postoperative changes in the thoracic spine          Impression:      No acute cardiopulmonary findings.    Signer Name: Terry Sandhu MD   Signed: 3/22/2023 7:53 PM   Workstation Name: [email or web address]    [facility]            -----------------------------------------------------------------------------------  Pertinent Infectious  Disease Results     CT abdomen pelvis with contrast on 2/12/2023 showed there is a new decubitus ulcer just to the right of midline at the level of the coccyx with constellation of imaging findings most characteristic of acute infection/osteomyelitis. Small pocket of air and fluid adjacent to the coccyx measuring 3.7 x 4.1 cm could reflect phlegmon or abscess. Chronic bilateral decubitus ulcers at the level of the ischial tuberosities bilaterally with imaging findings suggestive of chronic infection/osteomyelitis, similar to prior. Hepatic steatosis and hepatomegaly with borderline splenomegaly. Nonobstructing left renal stones. Postoperative changes of left lower quadrant and colostomy and right mid abdominal ileal conduit formation. Diverticulosis. No diverticulitis or bowel obstruction. COVID-19 and flu A/B PCR on 2/12/2023 was negative. Wound culture from 3/17/2023 finalized as pan susceptible Enterococcus faecalis and pan susceptible Enterococcus avium.    Status post excisional debridement of sacral ulcer on 2/13/2023 with Dr. Taylor.    Status post debridement with deep tissue culture with wound care GUANACO Handy on 3/17/2023.      Assessment/Plan         ASSESSMENT:    Septic shock with lactic acid greater than 4 on admission  Acute and chronic decubitus ulcerations with osteomyelitis and possible abscess      PLAN:    Patient is sitting up in bed on room air in no apparent distress.  Reports a recent history of diarrhea, which has resolved.  Denies any particular complaints today.  Lungs are clear to auscultation bilaterally.  Abdomen is distended but nontender, normal bowel sounds.  Afebrile, no diarrhea.  WBC remains normal at 10.52.  Respiratory panel on 3/22/2023 was negative.  Chest x-ray on 3/22/2023 was unremarkable.    Recommend to continue on Unasyn 3 g IV every 6 hours through 4/3/2023 for treatment of Enterococcus sacral wound infection.    Code Status:   Code Status and Medical  Interventions:   Ordered at: 02/21/23 1034     Code Status (Patient has no pulse and is not breathing):    CPR (Attempt to Resuscitate)     Medical Interventions (Patient has pulse or is breathing):    Full Support       Christy Caro PA-C  03/23/23  10:38 EDT

## 2023-03-23 NOTE — PLAN OF CARE
Goal Outcome Evaluation:  Plan of Care Reviewed With: patient           Outcome Evaluation: patient has rested well today has received large doses of insulin per orders BG has remained stable wound care done per order will continue plan of care
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
## 2023-03-24 LAB
GLUCOSE BLDC GLUCOMTR-MCNC: 137 MG/DL (ref 70–130)
GLUCOSE BLDC GLUCOMTR-MCNC: 167 MG/DL (ref 70–130)
GLUCOSE BLDC GLUCOMTR-MCNC: 201 MG/DL (ref 70–130)
GLUCOSE BLDC GLUCOMTR-MCNC: 265 MG/DL (ref 70–130)
GLUCOSE BLDC GLUCOMTR-MCNC: 288 MG/DL (ref 70–130)

## 2023-03-24 PROCEDURE — 82962 GLUCOSE BLOOD TEST: CPT

## 2023-03-24 PROCEDURE — 63710000001 INSULIN DETEMIR PER 5 UNITS: Performed by: HOSPITALIST

## 2023-03-24 PROCEDURE — 63710000001 INSULIN ASPART PER 5 UNITS: Performed by: HOSPITALIST

## 2023-03-24 PROCEDURE — 25010000002 AMPICILLIN-SULBACTAM PER 1.5 G: Performed by: INTERNAL MEDICINE

## 2023-03-24 PROCEDURE — 99309 SBSQ NF CARE MODERATE MDM 30: CPT | Performed by: INTERNAL MEDICINE

## 2023-03-24 RX ADMIN — Medication 10 ML: at 22:05

## 2023-03-24 RX ADMIN — DAKIN'S SOLUTION 0.125% (QUARTER STRENGTH): 0.12 SOLUTION at 13:45

## 2023-03-24 RX ADMIN — FERROUS SULFATE TAB 325 MG (65 MG ELEMENTAL FE) 325 MG: 325 (65 FE) TAB at 08:50

## 2023-03-24 RX ADMIN — DULOXETINE HYDROCHLORIDE 60 MG: 60 CAPSULE, DELAYED RELEASE ORAL at 22:03

## 2023-03-24 RX ADMIN — INSULIN ASPART 145 UNITS: 100 INJECTION, SOLUTION INTRAVENOUS; SUBCUTANEOUS at 08:03

## 2023-03-24 RX ADMIN — BUMETANIDE 2 MG: 1 TABLET ORAL at 08:49

## 2023-03-24 RX ADMIN — INSULIN DETEMIR 121 UNITS: 100 INJECTION, SOLUTION SUBCUTANEOUS at 08:04

## 2023-03-24 RX ADMIN — Medication 10 ML: at 08:52

## 2023-03-24 RX ADMIN — INSULIN ASPART 56 UNITS: 100 INJECTION, SOLUTION INTRAVENOUS; SUBCUTANEOUS at 11:46

## 2023-03-24 RX ADMIN — SACUBITRIL AND VALSARTAN 1 TABLET: 24; 26 TABLET, FILM COATED ORAL at 22:03

## 2023-03-24 RX ADMIN — ARIPIPRAZOLE 10 MG: 10 TABLET ORAL at 22:03

## 2023-03-24 RX ADMIN — NYSTATIN: 100000 POWDER TOPICAL at 22:05

## 2023-03-24 RX ADMIN — BACLOFEN 30 MG: 10 TABLET ORAL at 11:07

## 2023-03-24 RX ADMIN — BACLOFEN 30 MG: 10 TABLET ORAL at 17:02

## 2023-03-24 RX ADMIN — DICYCLOMINE HYDROCHLORIDE 10 MG: 10 CAPSULE ORAL at 22:03

## 2023-03-24 RX ADMIN — Medication 1000 UNITS: at 08:51

## 2023-03-24 RX ADMIN — BACLOFEN 30 MG: 10 TABLET ORAL at 08:50

## 2023-03-24 RX ADMIN — SUCRALFATE 1 G: 1 TABLET ORAL at 08:52

## 2023-03-24 RX ADMIN — AMPICILLIN SODIUM AND SULBACTAM SODIUM 3 G: 2; 1 INJECTION, POWDER, FOR SOLUTION INTRAMUSCULAR; INTRAVENOUS at 22:02

## 2023-03-24 RX ADMIN — INSULIN ASPART 140 UNITS: 100 INJECTION, SOLUTION INTRAVENOUS; SUBCUTANEOUS at 17:03

## 2023-03-24 RX ADMIN — APIXABAN 5 MG: 5 TABLET, FILM COATED ORAL at 08:50

## 2023-03-24 RX ADMIN — METFORMIN HYDROCHLORIDE 1000 MG: 500 TABLET ORAL at 17:02

## 2023-03-24 RX ADMIN — INSULIN DETEMIR 121 UNITS: 100 INJECTION, SOLUTION SUBCUTANEOUS at 23:35

## 2023-03-24 RX ADMIN — VITAMINS A AND D OINTMENT 1 APPLICATION: 15.5; 53.4 OINTMENT TOPICAL at 08:52

## 2023-03-24 RX ADMIN — Medication 1 CAPSULE: at 08:50

## 2023-03-24 RX ADMIN — MAGNESIUM GLUCONATE 500 MG ORAL TABLET 1200 MG: 500 TABLET ORAL at 08:49

## 2023-03-24 RX ADMIN — APIXABAN 5 MG: 5 TABLET, FILM COATED ORAL at 22:03

## 2023-03-24 RX ADMIN — MODAFINIL 200 MG: 100 TABLET ORAL at 08:49

## 2023-03-24 RX ADMIN — GABAPENTIN 800 MG: 400 CAPSULE ORAL at 08:50

## 2023-03-24 RX ADMIN — METFORMIN HYDROCHLORIDE 1000 MG: 500 TABLET ORAL at 08:51

## 2023-03-24 RX ADMIN — ATORVASTATIN CALCIUM 10 MG: 10 TABLET, FILM COATED ORAL at 22:03

## 2023-03-24 RX ADMIN — EMPAGLIFLOZIN 10 MG: 10 TABLET, FILM COATED ORAL at 08:51

## 2023-03-24 RX ADMIN — AMPICILLIN SODIUM AND SULBACTAM SODIUM 3 G: 2; 1 INJECTION, POWDER, FOR SOLUTION INTRAMUSCULAR; INTRAVENOUS at 17:03

## 2023-03-24 RX ADMIN — NYSTATIN: 100000 POWDER TOPICAL at 08:53

## 2023-03-24 RX ADMIN — DAKIN'S SOLUTION 0.125% (QUARTER STRENGTH): 0.12 SOLUTION at 02:23

## 2023-03-24 RX ADMIN — GABAPENTIN 800 MG: 400 CAPSULE ORAL at 22:03

## 2023-03-24 RX ADMIN — Medication 1 TABLET: at 08:50

## 2023-03-24 RX ADMIN — PANTOPRAZOLE SODIUM 40 MG: 40 TABLET, DELAYED RELEASE ORAL at 22:03

## 2023-03-24 RX ADMIN — PANTOPRAZOLE SODIUM 40 MG: 40 TABLET, DELAYED RELEASE ORAL at 08:49

## 2023-03-24 RX ADMIN — AMPICILLIN SODIUM AND SULBACTAM SODIUM 3 G: 2; 1 INJECTION, POWDER, FOR SOLUTION INTRAMUSCULAR; INTRAVENOUS at 10:25

## 2023-03-24 RX ADMIN — DULOXETINE HYDROCHLORIDE 60 MG: 60 CAPSULE, DELAYED RELEASE ORAL at 08:50

## 2023-03-24 RX ADMIN — DICYCLOMINE HYDROCHLORIDE 10 MG: 10 CAPSULE ORAL at 08:52

## 2023-03-24 RX ADMIN — INSULIN ASPART 43 UNITS: 100 INJECTION, SOLUTION INTRAVENOUS; SUBCUTANEOUS at 22:00

## 2023-03-24 RX ADMIN — AMPICILLIN SODIUM AND SULBACTAM SODIUM 3 G: 2; 1 INJECTION, POWDER, FOR SOLUTION INTRAMUSCULAR; INTRAVENOUS at 04:33

## 2023-03-24 RX ADMIN — BACLOFEN 30 MG: 10 TABLET ORAL at 22:03

## 2023-03-24 RX ADMIN — VITAMINS A AND D OINTMENT 1 APPLICATION: 15.5; 53.4 OINTMENT TOPICAL at 22:05

## 2023-03-24 RX ADMIN — SPIRONOLACTONE 25 MG: 25 TABLET ORAL at 08:52

## 2023-03-24 RX ADMIN — GABAPENTIN 800 MG: 400 CAPSULE ORAL at 15:46

## 2023-03-24 RX ADMIN — OXYCODONE HYDROCHLORIDE AND ACETAMINOPHEN 500 MG: 500 TABLET ORAL at 08:50
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
## 2023-03-24 NOTE — PROGRESS NOTES
Antimicrobial Length of Therapy:    Unasyn day 5 of therapy, to continue until 4/3    Thank you,   Esther Vick, Pharm.D.   Pharmacy Resident   3/24/2023  10:11 EDT

## 2023-03-24 NOTE — PLAN OF CARE
Goal Outcome Evaluation:              Outcome Evaluation: Pt repositioned in bed every two hours utilizing wedge and pillows. IV ABX administered as ordered. Will continue with plan of care.

## 2023-03-24 NOTE — PLAN OF CARE
Goal Outcome Evaluation:   Pt has rested in bed today. No complaints of pain. Wound care done per orders. VSS. No requests at this time. Will continue with plan of care.

## 2023-03-24 NOTE — PROGRESS NOTES
PROGRESS NOTE         Patient Identification:  Name:  Ken Krueger  Age:  47 y.o.  Sex:  male  :  1975  MRN:  [id number]  Visit Number:  [id number]  Primary Care Provider:  Franchesca Hodges APRN         LOS: 31 days       ----------------------------------------------------------------------------------------------------------------------  Subjective       Chief Complaints:    No chief complaint on file.        Interval History:      Patient is sitting up in bed on room air in no apparent distress.  Seems to be in good spirits this morning and denies any complaints at this time.  Stools in colostomy remains soft.  Lungs are clear to auscultation bilaterally.  Abdomen is distended but nontender, with normal bowel sounds.  Afebrile, no diarrhea.  No new labs today.    Review of Systems:    Constitutional: No fever, chills. No night sweats. No appetite change or unexpected weight change. No fatigue.  Eyes: no eye drainage, itching or redness.  HEENT: no mouth sores, dysphagia or nose bleed.  Respiratory: no for shortness of breath, cough or production of sputum.  Cardiovascular: no chest pain, no palpitations, no orthopnea.  Gastrointestinal: no nausea, vomiting or diarrhea. No abdominal pain, hematemesis or rectal bleeding.  Genitourinary: no dysuria or polyuria.  Hematologic/lymphatic: no lymph node abnormalities, no easy bruising or easy bleeding.  Musculoskeletal: no muscle or joint pain.  Skin: No rash and no itching.  Neurological: no loss of consciousness, no seizure, no headache.  Behavioral/Psych: no depression or suicidal ideation.  Endocrine: no hot flashes.  Immunologic: negative.    ----------------------------------------------------------------------------------------------------------------------      Objective       Current [facility] Meds:    Pharmacy Consult - Pharmacy to dose,        ----------------------------------------------------------------------------------------------------------------------    Vital Signs:  Temp:  [98 °F (36.7 °C)] 98 °F (36.7 °C)  Heart Rate:  [80-84] 81  Resp:  [20] 20  BP: (105-110)/(57-63) 107/57  No data found.  SpO2 Percentage    03/21/23 1900 03/22/23 0500 03/23/23 1900   SpO2: 96% 98% 97%     SpO2:  [97 %] 97 %  on   ;   Device (Oxygen Therapy): room air    Body mass index is 42.34 kg/m².  Wt Readings from Last 3 Encounters:   02/21/23 (!) 138 kg (303 lb 9.2 oz)   02/20/23 (!) 139 kg (306 lb 14.1 oz)   12/13/22 (!) 141 kg (310 lb 14.4 oz)        Intake/Output Summary (Last 24 hours) at 3/24/2023 0907  Last data filed at 3/24/2023 0511  Gross per 24 hour   Intake 1040 ml   Output 5425 ml   Net -4385 ml     Diet: Diabetic Diets; Consistent Carbohydrate; Texture: Regular Texture (IDDSI 7); Fluid Consistency: Thin (IDDSI 0)  ----------------------------------------------------------------------------------------------------------------------      Physical Exam:    Constitutional: Morbidly obese  male is sitting up in bed on room air in no apparent distress.  Appears comfortable.  HENT:  Head: Normocephalic and atraumatic.  Mouth:  Moist mucous membranes.    Eyes:  Conjunctivae and EOM are normal.  No scleral icterus.  Neck:  Neck supple.  No JVD present.    Cardiovascular:  Normal rate, regular rhythm and normal heart sounds with no murmur. No edema.  Pulmonary/Chest:  No respiratory distress, no wheezes, no crackles, with normal breath sounds and good air movement.  Lungs clear to auscultation bilaterally  Abdominal:  Morbidly obese.  Soft.  Bowel sounds are normal.  No distension and no tenderness.  Ileal conduit in place.  Colostomy bag in place with soft green stool.  Musculoskeletal:  No tenderness.  No swelling or redness of joints.  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 PICC to right upper arm with no signs of infection.  Neurological:  Alert and oriented  to person, place, and time.  No facial droop.  No slurred speech.   Skin:  Stage IV sacral decubitus wound.  Dressing in place.   Psychiatric:  Normal mood and affect.  Behavior is normal.      ----------------------------------------------------------------------------------------------------------------------            Results from last 7 days   Lab Units 03/23/23 0117 03/20/23 0423   WBC 10*3/mm3 10.52 10.42   HEMOGLOBIN g/dL 11.0* 11.1*   HEMATOCRIT % 38.4 38.3   MCV fL 89.9 89.3   MCHC g/dL 28.6* 29.0*   PLATELETS 10*3/mm3 323 305     Results from last 7 days   Lab Units 03/23/23  0117 03/20/23 1927 03/20/23 0423   SODIUM mmol/L 137  --  135*   POTASSIUM mmol/L 4.0 4.3 3.6   CHLORIDE mmol/L 102  --  99   CO2 mmol/L 22.7  --  21.8*   BUN mg/dL 49*  --  53*   CREATININE mg/dL 0.95  --  1.07   CALCIUM mg/dL 9.3  --  9.5   GLUCOSE mg/dL 253*  --  292*   ALBUMIN g/dL 3.2*  --   --    BILIRUBIN mg/dL <0.2  --   --    ALK PHOS U/L 133*  --   --    AST (SGOT) U/L 33  --   --    ALT (SGPT) U/L 46*  --   --    Estimated Creatinine Clearance: 138.9 mL/min (by C-G formula based on SCr of 0.95 mg/dL).  No results found for: AMMONIA    Glucose   Date/Time Value Ref Range Status   03/24/2023 0727 201 (H) 70 - 130 mg/dL Final     Comment:     Meter: [id number] : [id number] SUNIL VALLADARES   03/23/2023 2004 238 (H) 70 - 130 mg/dL Final     Comment:     Meter: [id number] : [id number] JULIANNA NIETO   03/23/2023 1653 163 (H) 70 - 130 mg/dL Final     Comment:     Meter: [id number] : [id number] [facility]   03/23/2023 1206 182 (H) 70 - 130 mg/dL Final     Comment:     Meter: [id number] : [id number] [facility]   03/23/2023 0747 207 (H) 70 - 130 mg/dL Final     Comment:     Meter: [id number] : [id number] [facility]   03/22/2023 2055 122 70 - 130 mg/dL Final     Comment:     Meter: [id number] : [id number] SIMONE PETTY   03/22/2023 1632 216 (H) 70 - 130 mg/dL Final     Comment:     Meter: [id number]  : [id number] SUNIL VALLADARES   03/22/2023 1146 189 (H) 70 - 130 mg/dL Final     Comment:     Meter: [id number] : [id number] SUNIL VALLADARES     Lab Results   Component Value Date    HGBA1C 11.50 (H) 02/13/2023     Lab Results   Component Value Date    TSH 3.780 07/19/2022    FREET4 1.31 06/02/2021       Blood Culture   Date Value Ref Range Status   02/12/2023 No growth at 24 hours  Preliminary   02/12/2023 No growth at 24 hours  Preliminary     No results found for: URINECX  No results found for: WOUNDCX  No results found for: STOOLCX  No results found for: RESPCX  Pain Management Panel       Pain Management Panel Latest Ref Rng & Units 6/2/2021 8/19/2018    AMPHETAMINES SCREEN, URINE Negative Negative Negative    BARBITURATES SCREEN Negative Negative Negative    BENZODIAZEPINE SCREEN, URINE Negative Negative Negative    BUPRENORPHINEUR Negative Negative Negative    COCAINE SCREEN, URINE Negative Negative Negative    METHADONE SCREEN, URINE Negative Negative Negative              ----------------------------------------------------------------------------------------------------------------------  Imaging Results (Last 24 Hours)       ** No results found for the last 24 hours. **            -----------------------------------------------------------------------------------  Pertinent Infectious Disease Results     CT abdomen pelvis with contrast on 2/12/2023 showed there is a new decubitus ulcer just to the right of midline at the level of the coccyx with constellation of imaging findings most characteristic of acute infection/osteomyelitis. Small pocket of air and fluid adjacent to the coccyx measuring 3.7 x 4.1 cm could reflect phlegmon or abscess. Chronic bilateral decubitus ulcers at the level of the ischial tuberosities bilaterally with imaging findings suggestive of chronic infection/osteomyelitis, similar to prior. Hepatic steatosis and hepatomegaly with borderline splenomegaly. Nonobstructing left renal  stones. Postoperative changes of left lower quadrant and colostomy and right mid abdominal ileal conduit formation. Diverticulosis. No diverticulitis or bowel obstruction. COVID-19 and flu A/B PCR on 2/12/2023 was negative. Wound culture from 3/17/2023 finalized as pan susceptible Enterococcus faecalis and pan susceptible Enterococcus avium. Respiratory panel on 3/22/2023 was negative.  Chest x-ray on 3/22/2023 was unremarkable.      Status post excisional debridement of sacral ulcer on 2/13/2023 with Dr. Taylor.    Status post debridement with deep tissue culture with wound care GUANACO Handy on 3/17/2023.        Assessment/Plan         ASSESSMENT:    Septic shock with lactic acid greater than 4 on admission  Acute and chronic decubitus ulcerations with osteomyelitis and possible abscess      PLAN:    I have seen and examined the patient myself this morning with Christy Caro PA-C and discussed overnight changes with primary RN here are my findings:    The patient is currently sitting up in bed and breathing room air without any visible signs of distress. They appear to be in good spirits and have not reported any complaints. Stools from the colostomy remain soft, and the patient's lungs are clear to auscultation on both sides. While the abdomen is distended, there is no tenderness, and normal bowel sounds are present. The patient is afebrile, and there is no evidence of diarrhea. There are no new laboratory results to report at this time. It is recommended that the patient continue receiving Unasyn 3 g intravenously every six hours until 4/3/2023 to treat the Enterococcus sacral wound infection. Would monitor the patient for any signs of diarrhea.      Code Status:   Code Status and Medical Interventions:   Ordered at: 02/21/23 1034     Code Status (Patient has no pulse and is not breathing):    CPR (Attempt to Resuscitate)     Medical Interventions (Patient has pulse or is breathing):    Full Support        Christy Caro PA-C  03/24/23  09:07 EDT    Electronically signed by Christy Caro PA-C, 03/24/23, 9:10 AM EDT.  Electronically signed by Tra Jain MD, 03/24/23, 9:58 AM EDT.
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
## 2023-03-24 NOTE — CASE MANAGEMENT/SOCIAL WORK
Discharge Planning Assessment  MAKI Regalado     Patient Name: Ken Krueger  MRN: [id number]  Today's Date: 3/24/2023    Admit Date: 2/21/2023    Plan: Pt was admitted to Swing Bed on 2/21/23. Pt was approved for additional swing bed days with clinical updates due 3/28/23. SS to follow and assist with discharge planning.     Discharge Plan     Row Name 03/24/23 0928       Plan    Plan Pt was admitted to Swing Bed on 2/21/23. Pt was approved for additional swing bed days with clinical updates due 3/28/23. SS to follow and assist with discharge planning.                    HUMPHREY Loya

## 2023-03-25 LAB
ANION GAP SERPL CALCULATED.3IONS-SCNC: 12.1 MMOL/L (ref 5–15)
BASOPHILS # BLD AUTO: 0.04 10*3/MM3 (ref 0–0.2)
BASOPHILS NFR BLD AUTO: 0.4 % (ref 0–1.5)
BUN SERPL-MCNC: 38 MG/DL (ref 6–20)
BUN/CREAT SERPL: 44.7 (ref 7–25)
CALCIUM SPEC-SCNC: 9 MG/DL (ref 8.6–10.5)
CHLORIDE SERPL-SCNC: 103 MMOL/L (ref 98–107)
CO2 SERPL-SCNC: 23.9 MMOL/L (ref 22–29)
CREAT SERPL-MCNC: 0.85 MG/DL (ref 0.76–1.27)
DEPRECATED RDW RBC AUTO: 48.9 FL (ref 37–54)
EGFRCR SERPLBLD CKD-EPI 2021: 109.2 ML/MIN/1.73
EOSINOPHIL # BLD AUTO: 0.17 10*3/MM3 (ref 0–0.4)
EOSINOPHIL NFR BLD AUTO: 1.6 % (ref 0.3–6.2)
ERYTHROCYTE [DISTWIDTH] IN BLOOD BY AUTOMATED COUNT: 14.6 % (ref 12.3–15.4)
GLUCOSE BLDC GLUCOMTR-MCNC: 153 MG/DL (ref 70–130)
GLUCOSE BLDC GLUCOMTR-MCNC: 155 MG/DL (ref 70–130)
GLUCOSE BLDC GLUCOMTR-MCNC: 222 MG/DL (ref 70–130)
GLUCOSE BLDC GLUCOMTR-MCNC: 228 MG/DL (ref 70–130)
GLUCOSE SERPL-MCNC: 181 MG/DL (ref 65–99)
HCT VFR BLD AUTO: 37.7 % (ref 37.5–51)
HGB BLD-MCNC: 10.8 G/DL (ref 13–17.7)
HYPOCHROMIA BLD QL: NORMAL
IMM GRANULOCYTES # BLD AUTO: 0.06 10*3/MM3 (ref 0–0.05)
IMM GRANULOCYTES NFR BLD AUTO: 0.6 % (ref 0–0.5)
LYMPHOCYTES # BLD AUTO: 1.66 10*3/MM3 (ref 0.7–3.1)
LYMPHOCYTES NFR BLD AUTO: 15.7 % (ref 19.6–45.3)
MCH RBC QN AUTO: 26.2 PG (ref 26.6–33)
MCHC RBC AUTO-ENTMCNC: 28.6 G/DL (ref 31.5–35.7)
MCV RBC AUTO: 91.3 FL (ref 79–97)
MONOCYTES # BLD AUTO: 0.44 10*3/MM3 (ref 0.1–0.9)
MONOCYTES NFR BLD AUTO: 4.2 % (ref 5–12)
NEUTROPHILS NFR BLD AUTO: 77.5 % (ref 42.7–76)
NEUTROPHILS NFR BLD AUTO: 8.19 10*3/MM3 (ref 1.7–7)
NRBC BLD AUTO-RTO: 0 /100 WBC (ref 0–0.2)
PLAT MORPH BLD: NORMAL
PLATELET # BLD AUTO: 308 10*3/MM3 (ref 140–450)
PMV BLD AUTO: 9.8 FL (ref 6–12)
POLYCHROMASIA BLD QL SMEAR: NORMAL
POTASSIUM SERPL-SCNC: 4 MMOL/L (ref 3.5–5.2)
RBC # BLD AUTO: 4.13 10*6/MM3 (ref 4.14–5.8)
SODIUM SERPL-SCNC: 139 MMOL/L (ref 136–145)
WBC NRBC COR # BLD: 10.56 10*3/MM3 (ref 3.4–10.8)

## 2023-03-25 PROCEDURE — 85025 COMPLETE CBC W/AUTO DIFF WBC: CPT | Performed by: INTERNAL MEDICINE

## 2023-03-25 PROCEDURE — 25010000002 AMPICILLIN-SULBACTAM PER 1.5 G: Performed by: INTERNAL MEDICINE

## 2023-03-25 PROCEDURE — 63710000001 INSULIN DETEMIR PER 5 UNITS: Performed by: HOSPITALIST

## 2023-03-25 PROCEDURE — 85007 BL SMEAR W/DIFF WBC COUNT: CPT | Performed by: INTERNAL MEDICINE

## 2023-03-25 PROCEDURE — 63710000001 INSULIN ASPART PER 5 UNITS: Performed by: HOSPITALIST

## 2023-03-25 PROCEDURE — 80048 BASIC METABOLIC PNL TOTAL CA: CPT | Performed by: INTERNAL MEDICINE

## 2023-03-25 PROCEDURE — 82962 GLUCOSE BLOOD TEST: CPT

## 2023-03-25 PROCEDURE — 99309 SBSQ NF CARE MODERATE MDM 30: CPT | Performed by: PHYSICIAN ASSISTANT

## 2023-03-25 RX ADMIN — ATORVASTATIN CALCIUM 10 MG: 10 TABLET, FILM COATED ORAL at 20:30

## 2023-03-25 RX ADMIN — VITAMINS A AND D OINTMENT 1 APPLICATION: 15.5; 53.4 OINTMENT TOPICAL at 20:30

## 2023-03-25 RX ADMIN — GABAPENTIN 800 MG: 400 CAPSULE ORAL at 08:05

## 2023-03-25 RX ADMIN — INSULIN ASPART 200 UNITS: 100 INJECTION, SOLUTION INTRAVENOUS; SUBCUTANEOUS at 17:02

## 2023-03-25 RX ADMIN — Medication 1 CAPSULE: at 08:05

## 2023-03-25 RX ADMIN — CARVEDILOL 12.5 MG: 6.25 TABLET, FILM COATED ORAL at 17:08

## 2023-03-25 RX ADMIN — FERROUS SULFATE TAB 325 MG (65 MG ELEMENTAL FE) 325 MG: 325 (65 FE) TAB at 08:05

## 2023-03-25 RX ADMIN — CARVEDILOL 12.5 MG: 6.25 TABLET, FILM COATED ORAL at 08:05

## 2023-03-25 RX ADMIN — Medication 10 ML: at 08:07

## 2023-03-25 RX ADMIN — BACLOFEN 30 MG: 10 TABLET ORAL at 21:20

## 2023-03-25 RX ADMIN — DULOXETINE HYDROCHLORIDE 60 MG: 60 CAPSULE, DELAYED RELEASE ORAL at 20:29

## 2023-03-25 RX ADMIN — PANTOPRAZOLE SODIUM 40 MG: 40 TABLET, DELAYED RELEASE ORAL at 08:05

## 2023-03-25 RX ADMIN — GABAPENTIN 800 MG: 400 CAPSULE ORAL at 17:01

## 2023-03-25 RX ADMIN — DAKIN'S SOLUTION 0.125% (QUARTER STRENGTH): 0.12 SOLUTION at 14:52

## 2023-03-25 RX ADMIN — MODAFINIL 200 MG: 100 TABLET ORAL at 08:04

## 2023-03-25 RX ADMIN — INSULIN ASPART 46 UNITS: 100 INJECTION, SOLUTION INTRAVENOUS; SUBCUTANEOUS at 12:04

## 2023-03-25 RX ADMIN — SACUBITRIL AND VALSARTAN 1 TABLET: 24; 26 TABLET, FILM COATED ORAL at 08:05

## 2023-03-25 RX ADMIN — SPIRONOLACTONE 25 MG: 25 TABLET ORAL at 08:05

## 2023-03-25 RX ADMIN — NYSTATIN: 100000 POWDER TOPICAL at 08:06

## 2023-03-25 RX ADMIN — DICYCLOMINE HYDROCHLORIDE 10 MG: 10 CAPSULE ORAL at 20:30

## 2023-03-25 RX ADMIN — Medication 1 TABLET: at 08:05

## 2023-03-25 RX ADMIN — APIXABAN 5 MG: 5 TABLET, FILM COATED ORAL at 20:29

## 2023-03-25 RX ADMIN — BACLOFEN 30 MG: 10 TABLET ORAL at 17:02

## 2023-03-25 RX ADMIN — AMPICILLIN SODIUM AND SULBACTAM SODIUM 3 G: 2; 1 INJECTION, POWDER, FOR SOLUTION INTRAMUSCULAR; INTRAVENOUS at 22:44

## 2023-03-25 RX ADMIN — EMPAGLIFLOZIN 10 MG: 10 TABLET, FILM COATED ORAL at 08:04

## 2023-03-25 RX ADMIN — OXYCODONE HYDROCHLORIDE AND ACETAMINOPHEN 500 MG: 500 TABLET ORAL at 08:05

## 2023-03-25 RX ADMIN — GABAPENTIN 800 MG: 400 CAPSULE ORAL at 20:28

## 2023-03-25 RX ADMIN — INSULIN ASPART 44 UNITS: 100 INJECTION, SOLUTION INTRAVENOUS; SUBCUTANEOUS at 21:20

## 2023-03-25 RX ADMIN — INSULIN DETEMIR 121 UNITS: 100 INJECTION, SOLUTION SUBCUTANEOUS at 21:19

## 2023-03-25 RX ADMIN — AMPICILLIN SODIUM AND SULBACTAM SODIUM 3 G: 2; 1 INJECTION, POWDER, FOR SOLUTION INTRAMUSCULAR; INTRAVENOUS at 05:20

## 2023-03-25 RX ADMIN — VITAMINS A AND D OINTMENT 1 APPLICATION: 15.5; 53.4 OINTMENT TOPICAL at 08:07

## 2023-03-25 RX ADMIN — BACLOFEN 30 MG: 10 TABLET ORAL at 11:15

## 2023-03-25 RX ADMIN — Medication 1000 UNITS: at 08:04

## 2023-03-25 RX ADMIN — DICYCLOMINE HYDROCHLORIDE 10 MG: 10 CAPSULE ORAL at 08:06

## 2023-03-25 RX ADMIN — APIXABAN 5 MG: 5 TABLET, FILM COATED ORAL at 08:05

## 2023-03-25 RX ADMIN — INSULIN ASPART 139 UNITS: 100 INJECTION, SOLUTION INTRAVENOUS; SUBCUTANEOUS at 07:55

## 2023-03-25 RX ADMIN — NYSTATIN: 100000 POWDER TOPICAL at 20:31

## 2023-03-25 RX ADMIN — METFORMIN HYDROCHLORIDE 1000 MG: 500 TABLET ORAL at 08:05

## 2023-03-25 RX ADMIN — BUMETANIDE 2 MG: 1 TABLET ORAL at 08:04

## 2023-03-25 RX ADMIN — AMPICILLIN SODIUM AND SULBACTAM SODIUM 3 G: 2; 1 INJECTION, POWDER, FOR SOLUTION INTRAMUSCULAR; INTRAVENOUS at 11:15

## 2023-03-25 RX ADMIN — PANTOPRAZOLE SODIUM 40 MG: 40 TABLET, DELAYED RELEASE ORAL at 20:29

## 2023-03-25 RX ADMIN — ARIPIPRAZOLE 10 MG: 10 TABLET ORAL at 20:30

## 2023-03-25 RX ADMIN — DULOXETINE HYDROCHLORIDE 60 MG: 60 CAPSULE, DELAYED RELEASE ORAL at 08:05

## 2023-03-25 RX ADMIN — Medication 10 ML: at 20:31

## 2023-03-25 RX ADMIN — BACLOFEN 30 MG: 10 TABLET ORAL at 08:05

## 2023-03-25 RX ADMIN — METFORMIN HYDROCHLORIDE 1000 MG: 500 TABLET ORAL at 17:08

## 2023-03-25 RX ADMIN — SUCRALFATE 1 G: 1 TABLET ORAL at 08:06

## 2023-03-25 RX ADMIN — MAGNESIUM GLUCONATE 500 MG ORAL TABLET 1200 MG: 500 TABLET ORAL at 08:05

## 2023-03-25 RX ADMIN — AMPICILLIN SODIUM AND SULBACTAM SODIUM 3 G: 2; 1 INJECTION, POWDER, FOR SOLUTION INTRAMUSCULAR; INTRAVENOUS at 17:09

## 2023-03-25 RX ADMIN — INSULIN DETEMIR 121 UNITS: 100 INJECTION, SOLUTION SUBCUTANEOUS at 08:06

## 2023-03-25 NOTE — PLAN OF CARE
Goal Outcome Evaluation:              Outcome Evaluation: patient has rested well tonight with no complaints. right leg wound care completed per wounds.
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
## 2023-03-25 NOTE — PLAN OF CARE
Goal Outcome Evaluation:  Plan of Care Reviewed With: patient           Outcome Evaluation: patient has been very sleepy today will wake to voice wound care completed vss will continue with plan of care

## 2023-03-25 NOTE — PROGRESS NOTES
PROGRESS NOTE         Patient Identification:  Name:  Ken Krueger  Age:  47 y.o.  Sex:  male  :  1975  MRN:  [id number]  Visit Number:  [id number]  Primary Care Provider:  Franchesca Hodges APRN         LOS: 32 days       ----------------------------------------------------------------------------------------------------------------------  Subjective       Chief Complaints:    No chief complaint on file.      Interval History:      The patient is sitting up in bed on room air in no apparent distress.  Eating breakfast and appears comfortable.  Admits to congestion and a mild cough with production of green sputum.  Denies abdominal pain, nausea, vomiting, or increased output from ostomy today.  Mild expiratory wheezing bilaterally.  Abdomen is distended but nontender with normal bowel sounds.  Afebrile.  WBC remains normal at 10.56.    Review of Systems:    Constitutional: No fever, chills. No night sweats. No appetite change or unexpected weight change. No fatigue.  Eyes: no eye drainage, itching or redness.  HEENT: no mouth sores, dysphagia or nose bleed.  Congestion.  Respiratory: no for shortness of breath.  Productive cough.  Cardiovascular: no chest pain, no palpitations, no orthopnea.  Gastrointestinal: no nausea, vomiting or diarrhea. No abdominal pain, hematemesis or rectal bleeding.  Genitourinary: no dysuria or polyuria.  Hematologic/lymphatic: no lymph node abnormalities, no easy bruising or easy bleeding.  Musculoskeletal: no muscle or joint pain.  Skin: No rash and no itching.  Neurological: no loss of consciousness, no seizure, no headache.  Behavioral/Psych: no depression or suicidal ideation.  Endocrine: no hot flashes.  Immunologic: negative.    ----------------------------------------------------------------------------------------------------------------------      Objective       Current [facility] Meds:    Pharmacy Consult - Pharmacy to dose,        ----------------------------------------------------------------------------------------------------------------------    Vital Signs:  Temp:  [97.5 °F (36.4 °C)-97.6 °F (36.4 °C)] 97.5 °F (36.4 °C)  Heart Rate:  [89-90] 89  Resp:  [18] 18  BP: ()/(54-71) 97/54  No data found.  SpO2 Percentage    03/22/23 0500 03/23/23 1900 03/24/23 1806   SpO2: 98% 97% 95%     SpO2:  [95 %] 95 %  on   ;   Device (Oxygen Therapy): room air    Body mass index is 42.34 kg/m².  Wt Readings from Last 3 Encounters:   02/21/23 (!) 138 kg (303 lb 9.2 oz)   02/20/23 (!) 139 kg (306 lb 14.1 oz)   12/13/22 (!) 141 kg (310 lb 14.4 oz)        Intake/Output Summary (Last 24 hours) at 3/25/2023 0909  Last data filed at 3/25/2023 0800  Gross per 24 hour   Intake 1609 ml   Output 5725 ml   Net -4116 ml     Diet: Diabetic Diets; Consistent Carbohydrate; Texture: Regular Texture (IDDSI 7); Fluid Consistency: Thin (IDDSI 0)  ----------------------------------------------------------------------------------------------------------------------      Physical Exam:    Constitutional: Morbidly obese  male is sitting up in bed on room air in no apparent distress.  Eating breakfast and appears comfortable.  HENT:  Head: Normocephalic and atraumatic.  Mouth:  Moist mucous membranes.    Eyes:  Conjunctivae and EOM are normal.  No scleral icterus.  Neck:  Neck supple.  No JVD present.    Cardiovascular:  Normal rate, regular rhythm and normal heart sounds with no murmur. No edema.  Pulmonary/Chest:  No respiratory distress, with no rhonchi or rales.  Mild expiratory wheezing.  Abdominal:  Morbidly obese.  Soft.  Bowel sounds are normal.  No distension and no tenderness.  Ileal conduit in place.  Colostomy bag in place with soft stools.  Musculoskeletal:  No tenderness.  No swelling or redness of joints.  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 PICC to right upper arm with no signs of infection.  Neurological:  Alert and oriented to person, place, and time.   No facial droop.  No slurred speech.   Skin:  Stage IV sacral decubitus wound.  Dressing in place.   Psychiatric:  Normal mood and affect.  Behavior is normal.      ----------------------------------------------------------------------------------------------------------------------            Results from last 7 days   Lab Units 03/25/23 0312 03/23/23 0117 03/20/23 0423   WBC 10*3/mm3 10.56 10.52 10.42   HEMOGLOBIN g/dL 10.8* 11.0* 11.1*   HEMATOCRIT % 37.7 38.4 38.3   MCV fL 91.3 89.9 89.3   MCHC g/dL 28.6* 28.6* 29.0*   PLATELETS 10*3/mm3 308 323 305     Results from last 7 days   Lab Units 03/25/23 0312 03/23/23 0117 03/20/23 1927 03/20/23 0423   SODIUM mmol/L 139 137  --  135*   POTASSIUM mmol/L 4.0 4.0 4.3 3.6   CHLORIDE mmol/L 103 102  --  99   CO2 mmol/L 23.9 22.7  --  21.8*   BUN mg/dL 38* 49*  --  53*   CREATININE mg/dL 0.85 0.95  --  1.07   CALCIUM mg/dL 9.0 9.3  --  9.5   GLUCOSE mg/dL 181* 253*  --  292*   ALBUMIN g/dL  --  3.2*  --   --    BILIRUBIN mg/dL  --  <0.2  --   --    ALK PHOS U/L  --  133*  --   --    AST (SGOT) U/L  --  33  --   --    ALT (SGPT) U/L  --  46*  --   --    Estimated Creatinine Clearance: 155.2 mL/min (by C-G formula based on SCr of 0.85 mg/dL).  No results found for: AMMONIA    Glucose   Date/Time Value Ref Range Status   03/25/2023 0733 155 (H) 70 - 130 mg/dL Final     Comment:     Meter: [id number] : [id number] TREJO TREASURE   03/24/2023 2300 265 (H) 70 - 130 mg/dL Final     Comment:     Meter: [id number] : [id number] DEB ANDERSON   03/24/2023 2149 288 (H) 70 - 130 mg/dL Final     Comment:     Meter: [id number] : [id number] DEB CAMPBELLYERS   03/24/2023 1638 137 (H) 70 - 130 mg/dL Final     Comment:     Meter: [id number] : [id number] SUNIL VALLADARES   03/24/2023 1142 167 (H) 70 - 130 mg/dL Final     Comment:     Meter: [id number] : [id number] SUNIL VALLADARES   03/24/2023 0727 201 (H) 70 - 130 mg/dL Final     Comment:     Meter: [id number] : [id number] SUNIL  BLAINE   03/23/2023 2004 238 (H) 70 - 130 mg/dL Final     Comment:     Meter: [id number] : [id number] JULIANNA NIETO   03/23/2023 1653 163 (H) 70 - 130 mg/dL Final     Comment:     Meter: [id number] : [id number] NEIL AMANDA     Lab Results   Component Value Date    HGBA1C 11.50 (H) 02/13/2023     Lab Results   Component Value Date    TSH 3.780 07/19/2022    FREET4 1.31 06/02/2021       Blood Culture   Date Value Ref Range Status   02/12/2023 No growth at 24 hours  Preliminary   02/12/2023 No growth at 24 hours  Preliminary     No results found for: URINECX  No results found for: WOUNDCX  No results found for: STOOLCX  No results found for: RESPCX  Pain Management Panel       Pain Management Panel Latest Ref Rng & Units 6/2/2021 8/19/2018    AMPHETAMINES SCREEN, URINE Negative Negative Negative    BARBITURATES SCREEN Negative Negative Negative    BENZODIAZEPINE SCREEN, URINE Negative Negative Negative    BUPRENORPHINEUR Negative Negative Negative    COCAINE SCREEN, URINE Negative Negative Negative    METHADONE SCREEN, URINE Negative Negative Negative              ----------------------------------------------------------------------------------------------------------------------  Imaging Results (Last 24 Hours)       ** No results found for the last 24 hours. **            -----------------------------------------------------------------------------------  Pertinent Infectious Disease Results     CT abdomen pelvis with contrast on 2/12/2023 showed there is a new decubitus ulcer just to the right of midline at the level of the coccyx with constellation of imaging findings most characteristic of acute infection/osteomyelitis. Small pocket of air and fluid adjacent to the coccyx measuring 3.7 x 4.1 cm could reflect phlegmon or abscess. Chronic bilateral decubitus ulcers at the level of the ischial tuberosities bilaterally with imaging findings suggestive of chronic infection/osteomyelitis, similar to prior.  Hepatic steatosis and hepatomegaly with borderline splenomegaly. Nonobstructing left renal stones. Postoperative changes of left lower quadrant and colostomy and right mid abdominal ileal conduit formation. Diverticulosis. No diverticulitis or bowel obstruction. COVID-19 and flu A/B PCR on 2/12/2023 was negative. Wound culture from 3/17/2023 finalized as pan susceptible Enterococcus faecalis and pan susceptible Enterococcus avium. Respiratory panel on 3/22/2023 was negative.  Chest x-ray on 3/22/2023 was unremarkable.    Status post excisional debridement of sacral ulcer on 2/13/2023 with Dr. Taylor.    Status post debridement with deep tissue culture with wound care GUANACO Handy on 3/17/2023.    Assessment/Plan         ASSESSMENT:    Septic shock with lactic acid greater than 4 on admission  Acute and chronic decubitus ulcerations with osteomyelitis and possible abscess      PLAN:    I examined the patient this morning and he is sitting up in bed on room air in no apparent distress.  Eating breakfast and appears comfortable.  Admits to congestion and a mild cough with production of green sputum.  Denies abdominal pain, nausea, vomiting, or increased output from ostomy today.  Mild expiratory wheezing bilaterally.  Abdomen is distended but nontender with normal bowel sounds.  Afebrile.  WBC remains normal at 10.56.    Recent chest x-ray from 3/22/2023 showed no acute cardiopulmonary findings.  Would recommend repeat chest x-ray if patient has any respiratory worsening.  We will monitor labs in a.m.    Recommend to continue on Unasyn 3 g IV every 6 hours until 4/3/2023 to treat enterococcal sacral wound infection.    Code Status:   Code Status and Medical Interventions:   Ordered at: 02/21/23 1034     Code Status (Patient has no pulse and is not breathing):    CPR (Attempt to Resuscitate)     Medical Interventions (Patient has pulse or is breathing):    Full Support       Christy Caro  MALOU  03/25/23  09:09 EDT

## 2023-03-26 LAB
ALBUMIN SERPL-MCNC: 3.2 G/DL (ref 3.5–5.2)
ALBUMIN/GLOB SERPL: 0.7 G/DL
ALP SERPL-CCNC: 114 U/L (ref 39–117)
ALT SERPL W P-5'-P-CCNC: 41 U/L (ref 1–41)
ANION GAP SERPL CALCULATED.3IONS-SCNC: 11.3 MMOL/L (ref 5–15)
AST SERPL-CCNC: 43 U/L (ref 1–40)
BASOPHILS # BLD AUTO: 0.05 10*3/MM3 (ref 0–0.2)
BASOPHILS NFR BLD AUTO: 0.4 % (ref 0–1.5)
BILIRUB SERPL-MCNC: <0.2 MG/DL (ref 0–1.2)
BUN SERPL-MCNC: 35 MG/DL (ref 6–20)
BUN/CREAT SERPL: 38.5 (ref 7–25)
CALCIUM SPEC-SCNC: 9.2 MG/DL (ref 8.6–10.5)
CHLORIDE SERPL-SCNC: 103 MMOL/L (ref 98–107)
CO2 SERPL-SCNC: 23.7 MMOL/L (ref 22–29)
CREAT SERPL-MCNC: 0.91 MG/DL (ref 0.76–1.27)
DEPRECATED RDW RBC AUTO: 48.5 FL (ref 37–54)
EGFRCR SERPLBLD CKD-EPI 2021: 105.9 ML/MIN/1.73
EOSINOPHIL # BLD AUTO: 0.21 10*3/MM3 (ref 0–0.4)
EOSINOPHIL NFR BLD AUTO: 1.7 % (ref 0.3–6.2)
ERYTHROCYTE [DISTWIDTH] IN BLOOD BY AUTOMATED COUNT: 14.6 % (ref 12.3–15.4)
GLOBULIN UR ELPH-MCNC: 4.6 GM/DL
GLUCOSE BLDC GLUCOMTR-MCNC: 117 MG/DL (ref 70–130)
GLUCOSE BLDC GLUCOMTR-MCNC: 128 MG/DL (ref 70–130)
GLUCOSE BLDC GLUCOMTR-MCNC: 134 MG/DL (ref 70–130)
GLUCOSE BLDC GLUCOMTR-MCNC: 163 MG/DL (ref 70–130)
GLUCOSE SERPL-MCNC: 141 MG/DL (ref 65–99)
HCT VFR BLD AUTO: 38.6 % (ref 37.5–51)
HGB BLD-MCNC: 11.1 G/DL (ref 13–17.7)
HYPOCHROMIA BLD QL: NORMAL
IMM GRANULOCYTES # BLD AUTO: 0.06 10*3/MM3 (ref 0–0.05)
IMM GRANULOCYTES NFR BLD AUTO: 0.5 % (ref 0–0.5)
LYMPHOCYTES # BLD AUTO: 1.88 10*3/MM3 (ref 0.7–3.1)
LYMPHOCYTES NFR BLD AUTO: 14.9 % (ref 19.6–45.3)
MAGNESIUM SERPL-MCNC: 2.1 MG/DL (ref 1.6–2.6)
MCH RBC QN AUTO: 26.1 PG (ref 26.6–33)
MCHC RBC AUTO-ENTMCNC: 28.8 G/DL (ref 31.5–35.7)
MCV RBC AUTO: 90.6 FL (ref 79–97)
MONOCYTES # BLD AUTO: 0.58 10*3/MM3 (ref 0.1–0.9)
MONOCYTES NFR BLD AUTO: 4.6 % (ref 5–12)
NEUTROPHILS NFR BLD AUTO: 77.9 % (ref 42.7–76)
NEUTROPHILS NFR BLD AUTO: 9.84 10*3/MM3 (ref 1.7–7)
NRBC BLD AUTO-RTO: 0 /100 WBC (ref 0–0.2)
NT-PROBNP SERPL-MCNC: 1009 PG/ML (ref 0–450)
PLAT MORPH BLD: NORMAL
PLATELET # BLD AUTO: 346 10*3/MM3 (ref 140–450)
PMV BLD AUTO: 9.4 FL (ref 6–12)
POLYCHROMASIA BLD QL SMEAR: NORMAL
POTASSIUM SERPL-SCNC: 4.7 MMOL/L (ref 3.5–5.2)
PROT SERPL-MCNC: 7.8 G/DL (ref 6–8.5)
RBC # BLD AUTO: 4.26 10*6/MM3 (ref 4.14–5.8)
SODIUM SERPL-SCNC: 138 MMOL/L (ref 136–145)
WBC NRBC COR # BLD: 12.62 10*3/MM3 (ref 3.4–10.8)

## 2023-03-26 PROCEDURE — 82962 GLUCOSE BLOOD TEST: CPT

## 2023-03-26 PROCEDURE — 83880 ASSAY OF NATRIURETIC PEPTIDE: CPT | Performed by: INTERNAL MEDICINE

## 2023-03-26 PROCEDURE — 83735 ASSAY OF MAGNESIUM: CPT | Performed by: INTERNAL MEDICINE

## 2023-03-26 PROCEDURE — 85025 COMPLETE CBC W/AUTO DIFF WBC: CPT | Performed by: INTERNAL MEDICINE

## 2023-03-26 PROCEDURE — 99308 SBSQ NF CARE LOW MDM 20: CPT | Performed by: PHYSICIAN ASSISTANT

## 2023-03-26 PROCEDURE — 63710000001 INSULIN ASPART PER 5 UNITS: Performed by: HOSPITALIST

## 2023-03-26 PROCEDURE — 85007 BL SMEAR W/DIFF WBC COUNT: CPT | Performed by: INTERNAL MEDICINE

## 2023-03-26 PROCEDURE — 63710000001 INSULIN DETEMIR PER 5 UNITS: Performed by: HOSPITALIST

## 2023-03-26 PROCEDURE — 25010000002 AMPICILLIN-SULBACTAM PER 1.5 G: Performed by: INTERNAL MEDICINE

## 2023-03-26 PROCEDURE — 80053 COMPREHEN METABOLIC PANEL: CPT | Performed by: INTERNAL MEDICINE

## 2023-03-26 RX ORDER — BUMETANIDE 0.25 MG/ML
2 INJECTION INTRAMUSCULAR; INTRAVENOUS ONCE
Status: COMPLETED | OUTPATIENT
Start: 2023-03-26 | End: 2023-03-26

## 2023-03-26 RX ADMIN — CARVEDILOL 12.5 MG: 6.25 TABLET, FILM COATED ORAL at 17:00

## 2023-03-26 RX ADMIN — BACLOFEN 30 MG: 10 TABLET ORAL at 21:28

## 2023-03-26 RX ADMIN — SUCRALFATE 1 G: 1 TABLET ORAL at 08:45

## 2023-03-26 RX ADMIN — METFORMIN HYDROCHLORIDE 1000 MG: 500 TABLET ORAL at 17:01

## 2023-03-26 RX ADMIN — APIXABAN 5 MG: 5 TABLET, FILM COATED ORAL at 21:28

## 2023-03-26 RX ADMIN — FERROUS SULFATE TAB 325 MG (65 MG ELEMENTAL FE) 325 MG: 325 (65 FE) TAB at 08:46

## 2023-03-26 RX ADMIN — AMPICILLIN SODIUM AND SULBACTAM SODIUM 3 G: 2; 1 INJECTION, POWDER, FOR SOLUTION INTRAMUSCULAR; INTRAVENOUS at 11:53

## 2023-03-26 RX ADMIN — SPIRONOLACTONE 25 MG: 25 TABLET ORAL at 08:45

## 2023-03-26 RX ADMIN — BUMETANIDE 2 MG: 0.25 INJECTION INTRAMUSCULAR; INTRAVENOUS at 17:35

## 2023-03-26 RX ADMIN — INSULIN ASPART 23 UNITS: 100 INJECTION, SOLUTION INTRAVENOUS; SUBCUTANEOUS at 21:27

## 2023-03-26 RX ADMIN — EMPAGLIFLOZIN 10 MG: 10 TABLET, FILM COATED ORAL at 08:45

## 2023-03-26 RX ADMIN — Medication 1 CAPSULE: at 08:46

## 2023-03-26 RX ADMIN — SACUBITRIL AND VALSARTAN 1 TABLET: 24; 26 TABLET, FILM COATED ORAL at 08:46

## 2023-03-26 RX ADMIN — AMPICILLIN SODIUM AND SULBACTAM SODIUM 3 G: 2; 1 INJECTION, POWDER, FOR SOLUTION INTRAMUSCULAR; INTRAVENOUS at 17:00

## 2023-03-26 RX ADMIN — METFORMIN HYDROCHLORIDE 1000 MG: 500 TABLET ORAL at 08:46

## 2023-03-26 RX ADMIN — NYSTATIN: 100000 POWDER TOPICAL at 08:46

## 2023-03-26 RX ADMIN — Medication 1000 UNITS: at 08:45

## 2023-03-26 RX ADMIN — DULOXETINE HYDROCHLORIDE 60 MG: 60 CAPSULE, DELAYED RELEASE ORAL at 21:28

## 2023-03-26 RX ADMIN — INSULIN ASPART 78 UNITS: 100 INJECTION, SOLUTION INTRAVENOUS; SUBCUTANEOUS at 17:00

## 2023-03-26 RX ADMIN — PANTOPRAZOLE SODIUM 40 MG: 40 TABLET, DELAYED RELEASE ORAL at 21:31

## 2023-03-26 RX ADMIN — VITAMINS A AND D OINTMENT 1 APPLICATION: 15.5; 53.4 OINTMENT TOPICAL at 21:30

## 2023-03-26 RX ADMIN — MAGNESIUM GLUCONATE 500 MG ORAL TABLET 1200 MG: 500 TABLET ORAL at 08:45

## 2023-03-26 RX ADMIN — NYSTATIN: 100000 POWDER TOPICAL at 21:30

## 2023-03-26 RX ADMIN — GABAPENTIN 800 MG: 400 CAPSULE ORAL at 21:28

## 2023-03-26 RX ADMIN — AMPICILLIN SODIUM AND SULBACTAM SODIUM 3 G: 2; 1 INJECTION, POWDER, FOR SOLUTION INTRAMUSCULAR; INTRAVENOUS at 22:08

## 2023-03-26 RX ADMIN — AMPICILLIN SODIUM AND SULBACTAM SODIUM 3 G: 2; 1 INJECTION, POWDER, FOR SOLUTION INTRAMUSCULAR; INTRAVENOUS at 04:09

## 2023-03-26 RX ADMIN — Medication 10 ML: at 21:29

## 2023-03-26 RX ADMIN — INSULIN DETEMIR 109 UNITS: 100 INJECTION, SOLUTION SUBCUTANEOUS at 07:44

## 2023-03-26 RX ADMIN — VITAMINS A AND D OINTMENT 1 APPLICATION: 15.5; 53.4 OINTMENT TOPICAL at 08:46

## 2023-03-26 RX ADMIN — ARIPIPRAZOLE 10 MG: 10 TABLET ORAL at 21:28

## 2023-03-26 RX ADMIN — BACLOFEN 30 MG: 10 TABLET ORAL at 08:45

## 2023-03-26 RX ADMIN — BACLOFEN 30 MG: 10 TABLET ORAL at 11:53

## 2023-03-26 RX ADMIN — GABAPENTIN 800 MG: 400 CAPSULE ORAL at 08:44

## 2023-03-26 RX ADMIN — INSULIN ASPART 28 UNITS: 100 INJECTION, SOLUTION INTRAVENOUS; SUBCUTANEOUS at 12:05

## 2023-03-26 RX ADMIN — INSULIN DETEMIR 109 UNITS: 100 INJECTION, SOLUTION SUBCUTANEOUS at 21:27

## 2023-03-26 RX ADMIN — Medication 1 TABLET: at 08:46

## 2023-03-26 RX ADMIN — DAKIN'S SOLUTION 0.125% (QUARTER STRENGTH): 0.12 SOLUTION at 15:13

## 2023-03-26 RX ADMIN — ATORVASTATIN CALCIUM 10 MG: 10 TABLET, FILM COATED ORAL at 21:28

## 2023-03-26 RX ADMIN — INSULIN ASPART 110 UNITS: 100 INJECTION, SOLUTION INTRAVENOUS; SUBCUTANEOUS at 07:43

## 2023-03-26 RX ADMIN — APIXABAN 5 MG: 5 TABLET, FILM COATED ORAL at 08:46

## 2023-03-26 RX ADMIN — BACLOFEN 30 MG: 10 TABLET ORAL at 17:00

## 2023-03-26 RX ADMIN — OXYCODONE HYDROCHLORIDE AND ACETAMINOPHEN 500 MG: 500 TABLET ORAL at 08:45

## 2023-03-26 RX ADMIN — DICYCLOMINE HYDROCHLORIDE 10 MG: 10 CAPSULE ORAL at 08:45

## 2023-03-26 RX ADMIN — BUMETANIDE 2 MG: 1 TABLET ORAL at 08:45

## 2023-03-26 RX ADMIN — DULOXETINE HYDROCHLORIDE 60 MG: 60 CAPSULE, DELAYED RELEASE ORAL at 08:46

## 2023-03-26 RX ADMIN — PANTOPRAZOLE SODIUM 40 MG: 40 TABLET, DELAYED RELEASE ORAL at 08:45

## 2023-03-26 RX ADMIN — CARVEDILOL 12.5 MG: 6.25 TABLET, FILM COATED ORAL at 08:46

## 2023-03-26 RX ADMIN — GABAPENTIN 800 MG: 400 CAPSULE ORAL at 17:00

## 2023-03-26 RX ADMIN — Medication 10 ML: at 08:45

## 2023-03-26 RX ADMIN — MODAFINIL 200 MG: 100 TABLET ORAL at 08:45

## 2023-03-26 RX ADMIN — DICYCLOMINE HYDROCHLORIDE 10 MG: 10 CAPSULE ORAL at 21:28

## 2023-03-26 NOTE — PLAN OF CARE
Goal Outcome Evaluation:  Plan of Care Reviewed With: patient        Progress: no change  Outcome Evaluation: Pt currently resting in bed, no acute changes or concerns at this time.

## 2023-03-26 NOTE — PROGRESS NOTES
PROGRESS NOTE         Patient Identification:  Name:  Ken Krueger  Age:  47 y.o.  Sex:  male  :  1975  MRN:  [id number]  Visit Number:  [id number]  Primary Care Provider:  Franchesca Hodges APRN         LOS: 33 days       ----------------------------------------------------------------------------------------------------------------------  Subjective       Chief Complaints:    No chief complaint on file.      Interval History:      The patient is sitting up in bed on CPAP in no apparent distress.  Denies any new complaints at this time but continues to have a mild cough with occasional production of sputum.  Lungs are clear to auscultation bilaterally.  Abdomen is distended but nontender with normal bowel sounds.  Afebrile, no increased output from ostomy.  No new labs today.    Review of Systems:    Constitutional: No fever, chills. No night sweats. No appetite change or unexpected weight change. No fatigue.  Eyes: no eye drainage, itching or redness.  HEENT: no mouth sores, dysphagia or nose bleed.  Congestion.  Respiratory: no for shortness of breath.  Productive cough.  Cardiovascular: no chest pain, no palpitations, no orthopnea.  Gastrointestinal: no nausea, vomiting or diarrhea. No abdominal pain, hematemesis or rectal bleeding.  Genitourinary: no dysuria or polyuria.  Hematologic/lymphatic: no lymph node abnormalities, no easy bruising or easy bleeding.  Musculoskeletal: no muscle or joint pain.  Skin: No rash and no itching.  Neurological: no loss of consciousness, no seizure, no headache.  Behavioral/Psych: no depression or suicidal ideation.  Endocrine: no hot flashes.  Immunologic: negative.    ----------------------------------------------------------------------------------------------------------------------      Objective       Current [facility] Meds:    Pharmacy Consult - Pharmacy to dose,        ----------------------------------------------------------------------------------------------------------------------    Vital Signs:  Temp:  [97.2 °F (36.2 °C)-98.7 °F (37.1 °C)] 98.7 °F (37.1 °C)  Heart Rate:  [72-89] 86  Resp:  [18] 18  BP: (100-103)/(53-64) 103/53  No data found.  SpO2 Percentage    03/23/23 1900 03/24/23 1806 03/26/23 0653   SpO2: 97% 95% 93%     SpO2:  [93 %] 93 %  on   ;   Device (Oxygen Therapy): room air    Body mass index is 42.34 kg/m².  Wt Readings from Last 3 Encounters:   02/21/23 (!) 138 kg (303 lb 9.2 oz)   02/20/23 (!) 139 kg (306 lb 14.1 oz)   12/13/22 (!) 141 kg (310 lb 14.4 oz)        Intake/Output Summary (Last 24 hours) at 3/26/2023 0950  Last data filed at 3/26/2023 0758  Gross per 24 hour   Intake 1140 ml   Output 5900 ml   Net -4760 ml     Diet: Diabetic Diets; Consistent Carbohydrate; Texture: Regular Texture (IDDSI 7); Fluid Consistency: Thin (IDDSI 0)  ----------------------------------------------------------------------------------------------------------------------      Physical Exam:    Constitutional: Morbidly obese  male is sitting up in bed on CPAP in no apparent distress.    HENT:  Head: Normocephalic and atraumatic.  Mouth:  Moist mucous membranes.    Eyes:  Conjunctivae and EOM are normal.  No scleral icterus.  Neck:  Neck supple.  No JVD present.    Cardiovascular:  Normal rate, regular rhythm and normal heart sounds with no murmur. No edema.  Pulmonary/Chest:  No respiratory distress, with no wheezing, rhonchi, or rales.  Clear to auscultation bilaterally.  Abdominal:  Morbidly obese.  Soft.  Bowel sounds are normal.  No distension and no tenderness.  Ileal conduit in place.  Colostomy bag in place with soft stools.  Musculoskeletal:  No tenderness.  No swelling or redness of joints.  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 PICC to right upper arm with no signs of infection.  Neurological:  Alert and oriented to person, place, and time.  No facial droop.  No  slurred speech.   Skin:  Stage IV sacral decubitus wound.  Dressing in place.   Psychiatric:  Normal mood and affect.  Behavior is normal.      ----------------------------------------------------------------------------------------------------------------------            Results from last 7 days   Lab Units 03/25/23 0312 03/23/23 0117 03/20/23 0423   WBC 10*3/mm3 10.56 10.52 10.42   HEMOGLOBIN g/dL 10.8* 11.0* 11.1*   HEMATOCRIT % 37.7 38.4 38.3   MCV fL 91.3 89.9 89.3   MCHC g/dL 28.6* 28.6* 29.0*   PLATELETS 10*3/mm3 308 323 305     Results from last 7 days   Lab Units 03/25/23 0312 03/23/23 0117 03/20/23 1927 03/20/23 0423   SODIUM mmol/L 139 137  --  135*   POTASSIUM mmol/L 4.0 4.0 4.3 3.6   CHLORIDE mmol/L 103 102  --  99   CO2 mmol/L 23.9 22.7  --  21.8*   BUN mg/dL 38* 49*  --  53*   CREATININE mg/dL 0.85 0.95  --  1.07   CALCIUM mg/dL 9.0 9.3  --  9.5   GLUCOSE mg/dL 181* 253*  --  292*   ALBUMIN g/dL  --  3.2*  --   --    BILIRUBIN mg/dL  --  <0.2  --   --    ALK PHOS U/L  --  133*  --   --    AST (SGOT) U/L  --  33  --   --    ALT (SGPT) U/L  --  46*  --   --    Estimated Creatinine Clearance: 155.2 mL/min (by C-G formula based on SCr of 0.85 mg/dL).  No results found for: AMMONIA    Glucose   Date/Time Value Ref Range Status   03/26/2023 0729 128 70 - 130 mg/dL Final     Comment:     Meter: [id number] : [id number] [facility]   03/25/2023 2034 228 (H) 70 - 130 mg/dL Final     Comment:     Meter: [id number] : [id number] ARTUROKWAME JACKSONCORTEZ   03/25/2023 1649 222 (H) 70 - 130 mg/dL Final     Comment:     Meter: [id number] : [id number] [facility]   03/25/2023 1158 153 (H) 70 - 130 mg/dL Final     Comment:     Meter: [id number] : [id number] [facility]   03/25/2023 0733 155 (H) 70 - 130 mg/dL Final     Comment:     Meter: [id number] : [id number] [facility]   03/24/2023 2300 265 (H) 70 - 130 mg/dL Final     Comment:     Meter: [id number] : [id number] DEB ANDERSON    03/24/2023 2149 288 (H) 70 - 130 mg/dL Final     Comment:     Meter: [id number] : [id number] DEB ANDERSON   03/24/2023 1638 137 (H) 70 - 130 mg/dL Final     Comment:     Meter: [id number] : [id number] SUNIL VALLADARES     Lab Results   Component Value Date    HGBA1C 11.50 (H) 02/13/2023     Lab Results   Component Value Date    TSH 3.780 07/19/2022    FREET4 1.31 06/02/2021       Blood Culture   Date Value Ref Range Status   02/12/2023 No growth at 24 hours  Preliminary   02/12/2023 No growth at 24 hours  Preliminary     No results found for: URINECX  No results found for: WOUNDCX  No results found for: STOOLCX  No results found for: RESPCX  Pain Management Panel       Pain Management Panel Latest Ref Rng & Units 6/2/2021 8/19/2018    AMPHETAMINES SCREEN, URINE Negative Negative Negative    BARBITURATES SCREEN Negative Negative Negative    BENZODIAZEPINE SCREEN, URINE Negative Negative Negative    BUPRENORPHINEUR Negative Negative Negative    COCAINE SCREEN, URINE Negative Negative Negative    METHADONE SCREEN, URINE Negative Negative Negative              ----------------------------------------------------------------------------------------------------------------------  Imaging Results (Last 24 Hours)       ** No results found for the last 24 hours. **            -----------------------------------------------------------------------------------  Pertinent Infectious Disease Results     CT abdomen pelvis with contrast on 2/12/2023 showed there is a new decubitus ulcer just to the right of midline at the level of the coccyx with constellation of imaging findings most characteristic of acute infection/osteomyelitis. Small pocket of air and fluid adjacent to the coccyx measuring 3.7 x 4.1 cm could reflect phlegmon or abscess. Chronic bilateral decubitus ulcers at the level of the ischial tuberosities bilaterally with imaging findings suggestive of chronic infection/osteomyelitis, similar to prior. Hepatic  steatosis and hepatomegaly with borderline splenomegaly. Nonobstructing left renal stones. Postoperative changes of left lower quadrant and colostomy and right mid abdominal ileal conduit formation. Diverticulosis. No diverticulitis or bowel obstruction. COVID-19 and flu A/B PCR on 2/12/2023 was negative. Wound culture from 3/17/2023 finalized as pan susceptible Enterococcus faecalis and pan susceptible Enterococcus avium. Respiratory panel on 3/22/2023 was negative.  Chest x-ray on 3/22/2023 was unremarkable.    Status post excisional debridement of sacral ulcer on 2/13/2023 with Dr. Taylor.    Status post debridement with deep tissue culture with wound care GUANACO Handy on 3/17/2023.    Assessment/Plan         ASSESSMENT:    Septic shock with lactic acid greater than 4 on admission  Acute and chronic decubitus ulcerations with osteomyelitis and possible abscess      PLAN:    I examined the patient this morning and he is sitting up in bed on CPAP in no apparent distress.  Denies any new complaints at this time but continues to have a mild cough with occasional production of sputum.  Lungs are clear to auscultation bilaterally.  Abdomen is distended but nontender with normal bowel sounds.  Afebrile, no increased output from ostomy.  No new labs today.    Recommend to continue on Unasyn 3 g IV every 6 hours until 4/3/2023 to treat enterococcal sacral wound infection.  Lung exam was clear today and will continue to monitor for worsening respiratory status.    Code Status:   Code Status and Medical Interventions:   Ordered at: 02/21/23 1034     Code Status (Patient has no pulse and is not breathing):    CPR (Attempt to Resuscitate)     Medical Interventions (Patient has pulse or is breathing):    Full Support       Christy Caro PA-C  03/26/23  09:50 EDT

## 2023-03-26 NOTE — PLAN OF CARE
Goal Outcome Evaluation:  Plan of Care Reviewed With: patient           Outcome Evaluation: has rested well today BG is better today but is still receiving insulin per orders VSS no acute changes wound care per order
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
## 2023-03-27 LAB
ANION GAP SERPL CALCULATED.3IONS-SCNC: 12 MMOL/L (ref 5–15)
BASOPHILS # BLD AUTO: 0.03 10*3/MM3 (ref 0–0.2)
BASOPHILS NFR BLD AUTO: 0.3 % (ref 0–1.5)
BUN SERPL-MCNC: 36 MG/DL (ref 6–20)
BUN/CREAT SERPL: 40.9 (ref 7–25)
CALCIUM SPEC-SCNC: 9 MG/DL (ref 8.6–10.5)
CHLORIDE SERPL-SCNC: 102 MMOL/L (ref 98–107)
CO2 SERPL-SCNC: 24 MMOL/L (ref 22–29)
CREAT SERPL-MCNC: 0.88 MG/DL (ref 0.76–1.27)
DEPRECATED RDW RBC AUTO: 47.6 FL (ref 37–54)
EGFRCR SERPLBLD CKD-EPI 2021: 108.1 ML/MIN/1.73
EOSINOPHIL # BLD AUTO: 0.24 10*3/MM3 (ref 0–0.4)
EOSINOPHIL NFR BLD AUTO: 2.2 % (ref 0.3–6.2)
ERYTHROCYTE [DISTWIDTH] IN BLOOD BY AUTOMATED COUNT: 14.6 % (ref 12.3–15.4)
GLUCOSE BLDC GLUCOMTR-MCNC: 115 MG/DL (ref 70–130)
GLUCOSE BLDC GLUCOMTR-MCNC: 136 MG/DL (ref 70–130)
GLUCOSE BLDC GLUCOMTR-MCNC: 139 MG/DL (ref 70–130)
GLUCOSE BLDC GLUCOMTR-MCNC: 160 MG/DL (ref 70–130)
GLUCOSE SERPL-MCNC: 155 MG/DL (ref 65–99)
HCT VFR BLD AUTO: 37.1 % (ref 37.5–51)
HGB BLD-MCNC: 10.7 G/DL (ref 13–17.7)
HYPOCHROMIA BLD QL: NORMAL
IMM GRANULOCYTES # BLD AUTO: 0.05 10*3/MM3 (ref 0–0.05)
IMM GRANULOCYTES NFR BLD AUTO: 0.5 % (ref 0–0.5)
LYMPHOCYTES # BLD AUTO: 1.78 10*3/MM3 (ref 0.7–3.1)
LYMPHOCYTES NFR BLD AUTO: 16.2 % (ref 19.6–45.3)
MAGNESIUM SERPL-MCNC: 2 MG/DL (ref 1.6–2.6)
MCH RBC QN AUTO: 25.9 PG (ref 26.6–33)
MCHC RBC AUTO-ENTMCNC: 28.8 G/DL (ref 31.5–35.7)
MCV RBC AUTO: 89.8 FL (ref 79–97)
MONOCYTES # BLD AUTO: 0.53 10*3/MM3 (ref 0.1–0.9)
MONOCYTES NFR BLD AUTO: 4.8 % (ref 5–12)
NEUTROPHILS NFR BLD AUTO: 76 % (ref 42.7–76)
NEUTROPHILS NFR BLD AUTO: 8.33 10*3/MM3 (ref 1.7–7)
NRBC BLD AUTO-RTO: 0 /100 WBC (ref 0–0.2)
PLAT MORPH BLD: NORMAL
PLATELET # BLD AUTO: 317 10*3/MM3 (ref 140–450)
PMV BLD AUTO: 9.5 FL (ref 6–12)
POTASSIUM SERPL-SCNC: 4.1 MMOL/L (ref 3.5–5.2)
RBC # BLD AUTO: 4.13 10*6/MM3 (ref 4.14–5.8)
SODIUM SERPL-SCNC: 138 MMOL/L (ref 136–145)
WBC NRBC COR # BLD: 10.96 10*3/MM3 (ref 3.4–10.8)

## 2023-03-27 PROCEDURE — 99308 SBSQ NF CARE LOW MDM 20: CPT | Performed by: PHYSICIAN ASSISTANT

## 2023-03-27 PROCEDURE — 82962 GLUCOSE BLOOD TEST: CPT

## 2023-03-27 PROCEDURE — 85007 BL SMEAR W/DIFF WBC COUNT: CPT | Performed by: INTERNAL MEDICINE

## 2023-03-27 PROCEDURE — 63710000001 INSULIN ASPART PER 5 UNITS: Performed by: HOSPITALIST

## 2023-03-27 PROCEDURE — 80048 BASIC METABOLIC PNL TOTAL CA: CPT | Performed by: INTERNAL MEDICINE

## 2023-03-27 PROCEDURE — 83735 ASSAY OF MAGNESIUM: CPT | Performed by: INTERNAL MEDICINE

## 2023-03-27 PROCEDURE — 25010000002 AMPICILLIN-SULBACTAM PER 1.5 G: Performed by: INTERNAL MEDICINE

## 2023-03-27 PROCEDURE — 63710000001 INSULIN DETEMIR PER 5 UNITS: Performed by: HOSPITALIST

## 2023-03-27 PROCEDURE — 85025 COMPLETE CBC W/AUTO DIFF WBC: CPT | Performed by: INTERNAL MEDICINE

## 2023-03-27 RX ORDER — METOLAZONE 2.5 MG/1
2.5 TABLET ORAL ONCE
Status: COMPLETED | OUTPATIENT
Start: 2023-03-27 | End: 2023-03-27

## 2023-03-27 RX ADMIN — AMPICILLIN SODIUM AND SULBACTAM SODIUM 3 G: 2; 1 INJECTION, POWDER, FOR SOLUTION INTRAMUSCULAR; INTRAVENOUS at 04:23

## 2023-03-27 RX ADMIN — AMPICILLIN SODIUM AND SULBACTAM SODIUM 3 G: 2; 1 INJECTION, POWDER, FOR SOLUTION INTRAMUSCULAR; INTRAVENOUS at 16:24

## 2023-03-27 RX ADMIN — Medication 1 CAPSULE: at 08:04

## 2023-03-27 RX ADMIN — CARVEDILOL 12.5 MG: 6.25 TABLET, FILM COATED ORAL at 17:36

## 2023-03-27 RX ADMIN — BACLOFEN 30 MG: 10 TABLET ORAL at 11:58

## 2023-03-27 RX ADMIN — APIXABAN 5 MG: 5 TABLET, FILM COATED ORAL at 21:00

## 2023-03-27 RX ADMIN — MAGNESIUM GLUCONATE 500 MG ORAL TABLET 1200 MG: 500 TABLET ORAL at 08:00

## 2023-03-27 RX ADMIN — BUMETANIDE 2 MG: 0.25 INJECTION, SOLUTION INTRAMUSCULAR; INTRAVENOUS at 11:58

## 2023-03-27 RX ADMIN — Medication 10 ML: at 08:06

## 2023-03-27 RX ADMIN — Medication 10 ML: at 21:03

## 2023-03-27 RX ADMIN — DICYCLOMINE HYDROCHLORIDE 10 MG: 10 CAPSULE ORAL at 22:51

## 2023-03-27 RX ADMIN — BACLOFEN 30 MG: 10 TABLET ORAL at 08:01

## 2023-03-27 RX ADMIN — INSULIN ASPART 102 UNITS: 100 INJECTION, SOLUTION INTRAVENOUS; SUBCUTANEOUS at 07:54

## 2023-03-27 RX ADMIN — GABAPENTIN 800 MG: 400 CAPSULE ORAL at 16:24

## 2023-03-27 RX ADMIN — Medication 1 TABLET: at 08:04

## 2023-03-27 RX ADMIN — INSULIN DETEMIR 98 UNITS: 100 INJECTION, SOLUTION SUBCUTANEOUS at 08:06

## 2023-03-27 RX ADMIN — GABAPENTIN 800 MG: 400 CAPSULE ORAL at 21:00

## 2023-03-27 RX ADMIN — PANTOPRAZOLE SODIUM 40 MG: 40 TABLET, DELAYED RELEASE ORAL at 08:04

## 2023-03-27 RX ADMIN — MODAFINIL 200 MG: 100 TABLET ORAL at 08:09

## 2023-03-27 RX ADMIN — DICYCLOMINE HYDROCHLORIDE 10 MG: 10 CAPSULE ORAL at 08:07

## 2023-03-27 RX ADMIN — SPIRONOLACTONE 25 MG: 25 TABLET ORAL at 08:05

## 2023-03-27 RX ADMIN — INSULIN DETEMIR 98 UNITS: 100 INJECTION, SOLUTION SUBCUTANEOUS at 21:16

## 2023-03-27 RX ADMIN — Medication 1000 UNITS: at 08:01

## 2023-03-27 RX ADMIN — METFORMIN HYDROCHLORIDE 1000 MG: 500 TABLET ORAL at 08:01

## 2023-03-27 RX ADMIN — GABAPENTIN 800 MG: 400 CAPSULE ORAL at 08:00

## 2023-03-27 RX ADMIN — NYSTATIN: 100000 POWDER TOPICAL at 08:05

## 2023-03-27 RX ADMIN — AMPICILLIN SODIUM AND SULBACTAM SODIUM 3 G: 2; 1 INJECTION, POWDER, FOR SOLUTION INTRAMUSCULAR; INTRAVENOUS at 10:48

## 2023-03-27 RX ADMIN — SUCRALFATE 1 G: 1 TABLET ORAL at 08:00

## 2023-03-27 RX ADMIN — BUMETANIDE 2 MG: 1 TABLET ORAL at 08:01

## 2023-03-27 RX ADMIN — DULOXETINE HYDROCHLORIDE 60 MG: 60 CAPSULE, DELAYED RELEASE ORAL at 08:05

## 2023-03-27 RX ADMIN — FERROUS SULFATE TAB 325 MG (65 MG ELEMENTAL FE) 325 MG: 325 (65 FE) TAB at 08:07

## 2023-03-27 RX ADMIN — DULOXETINE HYDROCHLORIDE 60 MG: 60 CAPSULE, DELAYED RELEASE ORAL at 21:00

## 2023-03-27 RX ADMIN — NYSTATIN: 100000 POWDER TOPICAL at 21:02

## 2023-03-27 RX ADMIN — DAKIN'S SOLUTION 0.125% (QUARTER STRENGTH): 0.12 SOLUTION at 16:24

## 2023-03-27 RX ADMIN — OXYCODONE HYDROCHLORIDE AND ACETAMINOPHEN 500 MG: 500 TABLET ORAL at 08:05

## 2023-03-27 RX ADMIN — ATORVASTATIN CALCIUM 10 MG: 10 TABLET, FILM COATED ORAL at 21:01

## 2023-03-27 RX ADMIN — SACUBITRIL AND VALSARTAN 1 TABLET: 24; 26 TABLET, FILM COATED ORAL at 08:05

## 2023-03-27 RX ADMIN — AMPICILLIN SODIUM AND SULBACTAM SODIUM 3 G: 2; 1 INJECTION, POWDER, FOR SOLUTION INTRAMUSCULAR; INTRAVENOUS at 21:17

## 2023-03-27 RX ADMIN — METOLAZONE 2.5 MG: 2.5 TABLET ORAL at 11:10

## 2023-03-27 RX ADMIN — VITAMINS A AND D OINTMENT 1 APPLICATION: 15.5; 53.4 OINTMENT TOPICAL at 08:05

## 2023-03-27 RX ADMIN — CARVEDILOL 12.5 MG: 6.25 TABLET, FILM COATED ORAL at 08:04

## 2023-03-27 RX ADMIN — EMPAGLIFLOZIN 10 MG: 10 TABLET, FILM COATED ORAL at 08:04

## 2023-03-27 RX ADMIN — PANTOPRAZOLE SODIUM 40 MG: 40 TABLET, DELAYED RELEASE ORAL at 21:01

## 2023-03-27 RX ADMIN — ARIPIPRAZOLE 10 MG: 10 TABLET ORAL at 21:00

## 2023-03-27 RX ADMIN — BACLOFEN 30 MG: 10 TABLET ORAL at 21:01

## 2023-03-27 RX ADMIN — INSULIN ASPART 22 UNITS: 100 INJECTION, SOLUTION INTRAVENOUS; SUBCUTANEOUS at 12:21

## 2023-03-27 RX ADMIN — BACLOFEN 30 MG: 10 TABLET ORAL at 17:36

## 2023-03-27 RX ADMIN — APIXABAN 5 MG: 5 TABLET, FILM COATED ORAL at 08:04

## 2023-03-27 RX ADMIN — VITAMINS A AND D OINTMENT 1 APPLICATION: 15.5; 53.4 OINTMENT TOPICAL at 21:02

## 2023-03-27 RX ADMIN — SACUBITRIL AND VALSARTAN 1 TABLET: 24; 26 TABLET, FILM COATED ORAL at 21:00

## 2023-03-27 RX ADMIN — METFORMIN HYDROCHLORIDE 1000 MG: 500 TABLET ORAL at 17:36

## 2023-03-27 NOTE — PROGRESS NOTES
PROGRESS NOTE         Patient Identification:  Name:  Ken Krueger  Age:  47 y.o.  Sex:  male  :  1975  MRN:  [id number]  Visit Number:  [id number]  Primary Care Provider:  Franchesca Hodges APRN         LOS: 34 days       ----------------------------------------------------------------------------------------------------------------------  Subjective       Chief Complaints:    No chief complaint on file.      Interval History:      The patient is resting comfortably in bed on CPAP in no apparent distress.  No new issues or complaints at this time.  Continues to have a mildly productive cough, but lungs remain clear to auscultation bilaterally.  Abdomen is distended but nontender with normal bowel sounds.  Afebrile, no increased output from ostomy.  WBC is improved at 10.96.    Review of Systems:    Constitutional: No fever, chills. No night sweats. No appetite change or unexpected weight change. No fatigue.  Eyes: no eye drainage, itching or redness.  HEENT: no mouth sores, dysphagia or nose bleed.  Congestion.  Respiratory: no for shortness of breath.  Productive cough.  Cardiovascular: no chest pain, no palpitations, no orthopnea.  Gastrointestinal: no nausea, vomiting or diarrhea. No abdominal pain, hematemesis or rectal bleeding.  Genitourinary: no dysuria or polyuria.  Hematologic/lymphatic: no lymph node abnormalities, no easy bruising or easy bleeding.  Musculoskeletal: no muscle or joint pain.  Skin: No rash and no itching.  Neurological: no loss of consciousness, no seizure, no headache.  Behavioral/Psych: no depression or suicidal ideation.  Endocrine: no hot flashes.  Immunologic: negative.    ----------------------------------------------------------------------------------------------------------------------      Objective       Current Hospital Meds:    Pharmacy Consult - Pharmacy to dose,        ----------------------------------------------------------------------------------------------------------------------    Vital Signs:  Temp:  [97.8 °F (36.6 °C)-98.4 °F (36.9 °C)] 97.8 °F (36.6 °C)  Heart Rate:  [78-89] 89  Resp:  [18] 18  BP: ()/(55-70) 112/70  Mean Arterial Pressure (Non-Invasive) for the past 24 hrs (Last 3 readings):   Noninvasive MAP (mmHg)   03/26/23 1900 68     SpO2 Percentage    03/26/23 0653 03/26/23 1900 03/27/23 0643   SpO2: 93% 97% 98%     SpO2:  [97 %-98 %] 98 %  on   ;   Device (Oxygen Therapy): room air    Body mass index is 42.34 kg/m².  Wt Readings from Last 3 Encounters:   02/21/23 (!) 138 kg (303 lb 9.2 oz)   02/20/23 (!) 139 kg (306 lb 14.1 oz)   12/13/22 (!) 141 kg (310 lb 14.4 oz)        Intake/Output Summary (Last 24 hours) at 3/27/2023 1126  Last data filed at 3/27/2023 0900  Gross per 24 hour   Intake 2171 ml   Output 6700 ml   Net -4529 ml     Diet: Diabetic Diets; Consistent Carbohydrate; Texture: Regular Texture (IDDSI 7); Fluid Consistency: Thin (IDDSI 0)  ----------------------------------------------------------------------------------------------------------------------      Physical Exam:    Constitutional: Morbidly obese  male is resting in bed on CPAP in no apparent distress.  Appears comfortable.  HENT:  Head: Normocephalic and atraumatic.  Mouth:  Moist mucous membranes.    Eyes:  Conjunctivae and EOM are normal.  No scleral icterus.  Neck:  Neck supple.  No JVD present.    Cardiovascular:  Normal rate, regular rhythm and normal heart sounds with no murmur. No edema.  Pulmonary/Chest:  No respiratory distress, with no wheezing, rhonchi, or rales.  Clear to auscultation bilaterally.  Abdominal:  Morbidly obese.  Soft.  Bowel sounds are normal.  No distension and no tenderness.  Ileal conduit in place.  Colostomy bag in place with soft stools.  Musculoskeletal:  No tenderness.  No swelling or redness of joints.  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 PICC to  right upper arm with no signs of infection.  Neurological:  Alert and oriented to person, place, and time.  No facial droop.  No slurred speech.   Skin:  Stage IV sacral decubitus wound.  Dressing in place.   Psychiatric:  Normal mood and affect.  Behavior is normal.      ----------------------------------------------------------------------------------------------------------------------      Results from last 7 days   Lab Units 03/26/23  1447   PROBNP pg/mL 1,009.0*         Results from last 7 days   Lab Units 03/27/23  0713 03/26/23 1447 03/25/23 0312   WBC 10*3/mm3 10.96* 12.62* 10.56   HEMOGLOBIN g/dL 10.7* 11.1* 10.8*   HEMATOCRIT % 37.1* 38.6 37.7   MCV fL 89.8 90.6 91.3   MCHC g/dL 28.8* 28.8* 28.6*   PLATELETS 10*3/mm3 317 346 308     Results from last 7 days   Lab Units 03/27/23  0713 03/26/23  1447 03/25/23  0312 03/23/23  0117   SODIUM mmol/L 138 138 139 137   POTASSIUM mmol/L 4.1 4.7 4.0 4.0   MAGNESIUM mg/dL 2.0 2.1  --   --    CHLORIDE mmol/L 102 103 103 102   CO2 mmol/L 24.0 23.7 23.9 22.7   BUN mg/dL 36* 35* 38* 49*   CREATININE mg/dL 0.88 0.91 0.85 0.95   CALCIUM mg/dL 9.0 9.2 9.0 9.3   GLUCOSE mg/dL 155* 141* 181* 253*   ALBUMIN g/dL  --  3.2*  --  3.2*   BILIRUBIN mg/dL  --  <0.2  --  <0.2   ALK PHOS U/L  --  114  --  133*   AST (SGOT) U/L  --  43*  --  33   ALT (SGPT) U/L  --  41  --  46*   Estimated Creatinine Clearance: 149.9 mL/min (by C-G formula based on SCr of 0.88 mg/dL).  No results found for: AMMONIA    Glucose   Date/Time Value Ref Range Status   03/27/2023 0725 139 (H) 70 - 130 mg/dL Final     Comment:     Meter: [id number] : [id number] josey enriquez   03/26/2023 2120 163 (H) 70 - 130 mg/dL Final     Comment:     Meter: [id number] : [id number] Nessa Estrada   03/26/2023 1648 117 70 - 130 mg/dL Final     Comment:     Meter: [id number] : [id number] ENIL AMANDA   03/26/2023 1159 134 (H) 70 - 130 mg/dL Final     Comment:     Meter: [id number] : [id number] NEIL  TREASURE   03/26/2023 0729 128 70 - 130 mg/dL Final     Comment:     Meter: [id number] : [id number] TREJO TREASURE   03/25/2023 2034 228 (H) 70 - 130 mg/dL Final     Comment:     Meter: [id number] : [id number] ARTURO CORTEZ   03/25/2023 1649 222 (H) 70 - 130 mg/dL Final     Comment:     Meter: [id number] : [id number] TREJO TREASURE   03/25/2023 1158 153 (H) 70 - 130 mg/dL Final     Comment:     Meter: [id number] : [id number] TREJO TREASURE     Lab Results   Component Value Date    HGBA1C 11.50 (H) 02/13/2023     Lab Results   Component Value Date    TSH 3.780 07/19/2022    FREET4 1.31 06/02/2021       Blood Culture   Date Value Ref Range Status   02/12/2023 No growth at 24 hours  Preliminary   02/12/2023 No growth at 24 hours  Preliminary     No results found for: URINECX  No results found for: WOUNDCX  No results found for: STOOLCX  No results found for: RESPCX  Pain Management Panel       Pain Management Panel Latest Ref Rng & Units 6/2/2021 8/19/2018    AMPHETAMINES SCREEN, URINE Negative Negative Negative    BARBITURATES SCREEN Negative Negative Negative    BENZODIAZEPINE SCREEN, URINE Negative Negative Negative    BUPRENORPHINEUR Negative Negative Negative    COCAINE SCREEN, URINE Negative Negative Negative    METHADONE SCREEN, URINE Negative Negative Negative              ----------------------------------------------------------------------------------------------------------------------  Imaging Results (Last 24 Hours)       ** No results found for the last 24 hours. **            -----------------------------------------------------------------------------------  Pertinent Infectious Disease Results     CT abdomen pelvis with contrast on 2/12/2023 showed there is a new decubitus ulcer just to the right of midline at the level of the coccyx with constellation of imaging findings most characteristic of acute infection/osteomyelitis. Small pocket of air and fluid adjacent to the coccyx measuring  3.7 x 4.1 cm could reflect phlegmon or abscess. Chronic bilateral decubitus ulcers at the level of the ischial tuberosities bilaterally with imaging findings suggestive of chronic infection/osteomyelitis, similar to prior. Hepatic steatosis and hepatomegaly with borderline splenomegaly. Nonobstructing left renal stones. Postoperative changes of left lower quadrant and colostomy and right mid abdominal ileal conduit formation. Diverticulosis. No diverticulitis or bowel obstruction. COVID-19 and flu A/B PCR on 2/12/2023 was negative. Wound culture from 3/17/2023 finalized as pan susceptible Enterococcus faecalis and pan susceptible Enterococcus avium. Respiratory panel on 3/22/2023 was negative.  Chest x-ray on 3/22/2023 was unremarkable.    Status post excisional debridement of sacral ulcer on 2/13/2023 with Dr. Taylor.    Status post debridement with deep tissue culture with wound care GUANACO Handy on 3/17/2023.    Assessment/Plan         ASSESSMENT:    Septic shock with lactic acid greater than 4 on admission  Acute and chronic decubitus ulcerations with osteomyelitis and possible abscess      PLAN:    I examined the patient this morning and he is resting comfortably in bed on CPAP in no apparent distress.  No new issues or complaints at this time.  Continues to have a mildly productive cough, but lungs remain clear to auscultation bilaterally.  Abdomen is distended but nontender with normal bowel sounds.  Afebrile, no increased output from ostomy.  WBC is improved at 10.96.    Recommend to continue on Unasyn 3 g IV every 6 hours until 4/3/2023 for enterococcal sacral wound infection.  Leukocytosis is nearly resolved today.    Code Status:   Code Status and Medical Interventions:   Ordered at: 02/21/23 1034     Code Status (Patient has no pulse and is not breathing):    CPR (Attempt to Resuscitate)     Medical Interventions (Patient has pulse or is breathing):    Full Support       Christy Caro  MALOU  03/27/23  11:26 EDT

## 2023-03-27 NOTE — PLAN OF CARE
Goal Outcome Evaluation:  Plan of Care Reviewed With: patient        Progress: no change  Outcome Evaluation: Patient's VSS. Pt wound care done per orders during shift. Pt voiced no further concerns at this time.

## 2023-03-27 NOTE — PAYOR COMM NOTE
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 216 (H) 70 - 130 mg/dL Final     Comment:     Meter: [id number] : [id number] SUNIL VALLADARES     Lab Results   Component Value Date     HGBA1C 11.50 (H) 02/13/2023     Lab Results   Component Value Date    TSH 3.780 07/19/2022    FREET4 1.31 06/02/2021       No results found for: BLOODCX  No results found for: URINECX  No results found for: WOUNDCX  No results found for: STOOLCX  No results found for: RESPCX  No results found for: MRSACX  Pain Management Panel     Pain Management Panel Latest Ref Rng & Units 6/2/2021 8/19/2018    AMPHETAMINES SCREEN, URINE Negative Negative Negative    BARBITURATES SCREEN Negative Negative Negative    BENZODIAZEPINE SCREEN, URINE Negative Negative Negative    BUPRENORPHINEUR Negative Negative Negative    COCAINE SCREEN, URINE Negative Negative Negative    METHADONE SCREEN, URINE Negative Negative Negative        I have personally reviewed the above laboratory results.   ---------------------------------------------------------------------------------------------------------------------    Assessment & Plan      Stage 4 PI sacrum  -DC wound vac  -Continue to clean with dakins, pack with honeygel moistened guaze and secure with silicone border dressing   -Advised to turn Q2 for offloading.   -Management of this condition is inherently complex, requiring ongoing optimization of many factors to assure the highest likelihood of a favorable outcome, including pressure relief, bioburden, multiple aspects of nutrition, infection management, and moisture and mechanical factors relevant to wound healing. Discussed that management of wounds is to prevent infections and maintaince as healing prognosis is poor. This again was discussed at length with the patient and his brother. I discussed options for surgical evaluation for flap, which they report no surgeon will offer. I offered evaluation for hyperbaric therapy, currently refusing at this time.      Stage 4 left/right gluteal  -pack area with Dakins and secure with ABD and tape.     Right lateal ankle-  Paint area with betadine to base secure with optifoam.     Right heel-  paint area with betadine and cover with optifoam     Scrotum- magic barrier     MASD- Ordered magic barrier to be applied PRN. This is currently healed, will order as he often has areas that reopen.      Paraplegia- Family helps to provide assistance for turning. Advised nursing staff to assist when family is not present and to turn Q2 for offloading      Diabetes Mellitus- Recommend tight glycemic control as A1c is 8.90. Patient reports taking medication as prescrbied. Reports glucose levels average 150-200. Advised to follow up with primary care provider to assist with medication adjustments for better glycemic control.      Obesity BMI 46.05- advised on high protein low carb diet, this will help with weight management as well     Recommend to follow-up in outpatient wound clinic once stable for discharge    GUANACO Ellington   [facility]  03/24/23  13:05 EDT      Electronically signed by Cathie Handy APRN at 03/24/23 1311                                                                                                               Discharge Planning Assessment  [facility]     Patient Name: Ken Krueger               MRN: [id number]  Today's Date: 3/24/2023                     Admit Date: 2/21/2023     Plan: Pt was admitted to Swing Bed on 2/21/23. Pt was approved for additional swing bed days with clinical updates due 3/28/23. SS to follow and assist with discharge planning.            Discharge Plan      Row Name 03/24/23 0928           Plan     Plan Pt was admitted to Swing Bed on 2/21/23. Pt was approved for additional swing bed days with clinical updates due 3/28/23. SS to follow and assist with discharge planning.                          HUMPHREY Loya

## 2023-03-27 NOTE — PLAN OF CARE
Goal Outcome Evaluation:              Outcome Evaluation: Patient has rested in bed, no complaints or request at this time, wound care completed per order, VSS, Will continue plan of care.

## 2023-03-27 NOTE — CASE MANAGEMENT/SOCIAL WORK
Discharge Planning Assessment   Fabricio     Patient Name: Ken Krueger  MRN: [id number]  Today's Date: 3/27/2023    Admit Date: 2/21/2023    Plan: Pt was admitted to Swing Bed on 2/21/23. SS was notified by Swing Bed Helder CHRISTIE pt's information has been submitted to pt's insurance on this date for clinical review. SS to follow.   Discharge Plan     Row Name 03/27/23 1136       Plan    Plan Pt was admitted to Swing Bed on 2/21/23. SS was notified by Swing Bed Helder CHRISTIE pt's information has been submitted to pt's insurance on this date for clinical review. SS to follow.                  HUMPHREY Loya

## 2023-03-28 LAB
ANION GAP SERPL CALCULATED.3IONS-SCNC: 11.7 MMOL/L (ref 5–15)
BASOPHILS # BLD AUTO: 0.03 10*3/MM3 (ref 0–0.2)
BASOPHILS NFR BLD AUTO: 0.2 % (ref 0–1.5)
BUN SERPL-MCNC: 48 MG/DL (ref 6–20)
BUN/CREAT SERPL: 42.5 (ref 7–25)
CALCIUM SPEC-SCNC: 9 MG/DL (ref 8.6–10.5)
CHLORIDE SERPL-SCNC: 99 MMOL/L (ref 98–107)
CO2 SERPL-SCNC: 25.3 MMOL/L (ref 22–29)
CREAT SERPL-MCNC: 1.13 MG/DL (ref 0.76–1.27)
DEPRECATED RDW RBC AUTO: 48.5 FL (ref 37–54)
EGFRCR SERPLBLD CKD-EPI 2021: 81.7 ML/MIN/1.73
EOSINOPHIL # BLD AUTO: 0.16 10*3/MM3 (ref 0–0.4)
EOSINOPHIL NFR BLD AUTO: 1.3 % (ref 0.3–6.2)
ERYTHROCYTE [DISTWIDTH] IN BLOOD BY AUTOMATED COUNT: 14.8 % (ref 12.3–15.4)
GLUCOSE BLDC GLUCOMTR-MCNC: 128 MG/DL (ref 70–130)
GLUCOSE BLDC GLUCOMTR-MCNC: 160 MG/DL (ref 70–130)
GLUCOSE BLDC GLUCOMTR-MCNC: 186 MG/DL (ref 70–130)
GLUCOSE BLDC GLUCOMTR-MCNC: 256 MG/DL (ref 70–130)
GLUCOSE SERPL-MCNC: 153 MG/DL (ref 65–99)
HCT VFR BLD AUTO: 35.9 % (ref 37.5–51)
HGB BLD-MCNC: 10.2 G/DL (ref 13–17.7)
HYPOCHROMIA BLD QL: NORMAL
IMM GRANULOCYTES # BLD AUTO: 0.06 10*3/MM3 (ref 0–0.05)
IMM GRANULOCYTES NFR BLD AUTO: 0.5 % (ref 0–0.5)
LYMPHOCYTES # BLD AUTO: 1.69 10*3/MM3 (ref 0.7–3.1)
LYMPHOCYTES NFR BLD AUTO: 13.9 % (ref 19.6–45.3)
MAGNESIUM SERPL-MCNC: 1.9 MG/DL (ref 1.6–2.6)
MCH RBC QN AUTO: 25.2 PG (ref 26.6–33)
MCHC RBC AUTO-ENTMCNC: 28.4 G/DL (ref 31.5–35.7)
MCV RBC AUTO: 88.6 FL (ref 79–97)
MONOCYTES # BLD AUTO: 0.46 10*3/MM3 (ref 0.1–0.9)
MONOCYTES NFR BLD AUTO: 3.8 % (ref 5–12)
NEUTROPHILS NFR BLD AUTO: 80.3 % (ref 42.7–76)
NEUTROPHILS NFR BLD AUTO: 9.79 10*3/MM3 (ref 1.7–7)
NRBC BLD AUTO-RTO: 0 /100 WBC (ref 0–0.2)
PLAT MORPH BLD: NORMAL
PLATELET # BLD AUTO: 319 10*3/MM3 (ref 140–450)
PMV BLD AUTO: 9.3 FL (ref 6–12)
POTASSIUM SERPL-SCNC: 3.7 MMOL/L (ref 3.5–5.2)
RBC # BLD AUTO: 4.05 10*6/MM3 (ref 4.14–5.8)
SODIUM SERPL-SCNC: 136 MMOL/L (ref 136–145)
STOMATOCYTES BLD QL SMEAR: NORMAL
WBC NRBC COR # BLD: 12.19 10*3/MM3 (ref 3.4–10.8)

## 2023-03-28 PROCEDURE — 82962 GLUCOSE BLOOD TEST: CPT

## 2023-03-28 PROCEDURE — 25010000002 AMPICILLIN-SULBACTAM PER 1.5 G: Performed by: INTERNAL MEDICINE

## 2023-03-28 PROCEDURE — 83735 ASSAY OF MAGNESIUM: CPT | Performed by: HOSPITALIST

## 2023-03-28 PROCEDURE — 85007 BL SMEAR W/DIFF WBC COUNT: CPT | Performed by: HOSPITALIST

## 2023-03-28 PROCEDURE — 63710000001 INSULIN DETEMIR PER 5 UNITS: Performed by: HOSPITALIST

## 2023-03-28 PROCEDURE — 63710000001 INSULIN ASPART PER 5 UNITS: Performed by: HOSPITALIST

## 2023-03-28 PROCEDURE — 63710000001 INSULIN LISPRO (HUMAN) PER 5 UNITS: Performed by: HOSPITALIST

## 2023-03-28 PROCEDURE — 85025 COMPLETE CBC W/AUTO DIFF WBC: CPT | Performed by: HOSPITALIST

## 2023-03-28 PROCEDURE — 99309 SBSQ NF CARE MODERATE MDM 30: CPT | Performed by: PHYSICIAN ASSISTANT

## 2023-03-28 PROCEDURE — 80048 BASIC METABOLIC PNL TOTAL CA: CPT | Performed by: HOSPITALIST

## 2023-03-28 RX ORDER — DEXTROSE MONOHYDRATE 25 G/50ML
25 INJECTION, SOLUTION INTRAVENOUS
Status: DISCONTINUED | OUTPATIENT
Start: 2023-03-28 | End: 2023-04-01 | Stop reason: SDUPTHER

## 2023-03-28 RX ORDER — INSULIN LISPRO 100 [IU]/ML
2-9 INJECTION, SOLUTION INTRAVENOUS; SUBCUTANEOUS
Status: DISCONTINUED | OUTPATIENT
Start: 2023-03-28 | End: 2023-04-04 | Stop reason: HOSPADM

## 2023-03-28 RX ORDER — GLUCAGON 1 MG/ML
1 KIT INJECTION
Status: DISCONTINUED | OUTPATIENT
Start: 2023-03-28 | End: 2023-04-01 | Stop reason: SDUPTHER

## 2023-03-28 RX ORDER — NICOTINE POLACRILEX 4 MG
15 LOZENGE BUCCAL
Status: DISCONTINUED | OUTPATIENT
Start: 2023-03-28 | End: 2023-04-01 | Stop reason: SDUPTHER

## 2023-03-28 RX ADMIN — BACLOFEN 30 MG: 10 TABLET ORAL at 08:13

## 2023-03-28 RX ADMIN — Medication 1000 UNITS: at 08:14

## 2023-03-28 RX ADMIN — GABAPENTIN 800 MG: 400 CAPSULE ORAL at 08:14

## 2023-03-28 RX ADMIN — SUCRALFATE 1 G: 1 TABLET ORAL at 08:10

## 2023-03-28 RX ADMIN — SACUBITRIL AND VALSARTAN 1 TABLET: 24; 26 TABLET, FILM COATED ORAL at 21:16

## 2023-03-28 RX ADMIN — MODAFINIL 200 MG: 100 TABLET ORAL at 08:14

## 2023-03-28 RX ADMIN — NYSTATIN: 100000 POWDER TOPICAL at 21:18

## 2023-03-28 RX ADMIN — PANTOPRAZOLE SODIUM 40 MG: 40 TABLET, DELAYED RELEASE ORAL at 08:10

## 2023-03-28 RX ADMIN — DULOXETINE HYDROCHLORIDE 60 MG: 60 CAPSULE, DELAYED RELEASE ORAL at 08:14

## 2023-03-28 RX ADMIN — Medication 10 ML: at 08:15

## 2023-03-28 RX ADMIN — INSULIN LISPRO 2 UNITS: 100 INJECTION, SOLUTION INTRAVENOUS; SUBCUTANEOUS at 18:02

## 2023-03-28 RX ADMIN — ARIPIPRAZOLE 10 MG: 10 TABLET ORAL at 21:16

## 2023-03-28 RX ADMIN — Medication 10 ML: at 21:19

## 2023-03-28 RX ADMIN — METFORMIN HYDROCHLORIDE 1000 MG: 500 TABLET ORAL at 17:37

## 2023-03-28 RX ADMIN — MAGNESIUM GLUCONATE 500 MG ORAL TABLET 1200 MG: 500 TABLET ORAL at 08:14

## 2023-03-28 RX ADMIN — BACLOFEN 30 MG: 10 TABLET ORAL at 17:37

## 2023-03-28 RX ADMIN — DAKIN'S SOLUTION 0.125% (QUARTER STRENGTH): 0.12 SOLUTION at 13:59

## 2023-03-28 RX ADMIN — OXYCODONE HYDROCHLORIDE AND ACETAMINOPHEN 500 MG: 500 TABLET ORAL at 08:14

## 2023-03-28 RX ADMIN — DULOXETINE HYDROCHLORIDE 60 MG: 60 CAPSULE, DELAYED RELEASE ORAL at 21:16

## 2023-03-28 RX ADMIN — AMPICILLIN SODIUM AND SULBACTAM SODIUM 3 G: 2; 1 INJECTION, POWDER, FOR SOLUTION INTRAMUSCULAR; INTRAVENOUS at 11:15

## 2023-03-28 RX ADMIN — INSULIN DETEMIR 88 UNITS: 100 INJECTION, SOLUTION SUBCUTANEOUS at 08:09

## 2023-03-28 RX ADMIN — AMPICILLIN SODIUM AND SULBACTAM SODIUM 3 G: 2; 1 INJECTION, POWDER, FOR SOLUTION INTRAMUSCULAR; INTRAVENOUS at 16:14

## 2023-03-28 RX ADMIN — BUMETANIDE 2 MG: 1 TABLET ORAL at 08:15

## 2023-03-28 RX ADMIN — APIXABAN 5 MG: 5 TABLET, FILM COATED ORAL at 08:14

## 2023-03-28 RX ADMIN — Medication 1 TABLET: at 08:14

## 2023-03-28 RX ADMIN — VITAMINS A AND D OINTMENT 1 APPLICATION: 15.5; 53.4 OINTMENT TOPICAL at 21:18

## 2023-03-28 RX ADMIN — CARVEDILOL 12.5 MG: 6.25 TABLET, FILM COATED ORAL at 08:10

## 2023-03-28 RX ADMIN — EMPAGLIFLOZIN 10 MG: 10 TABLET, FILM COATED ORAL at 11:15

## 2023-03-28 RX ADMIN — NYSTATIN: 100000 POWDER TOPICAL at 08:15

## 2023-03-28 RX ADMIN — DICYCLOMINE HYDROCHLORIDE 10 MG: 10 CAPSULE ORAL at 21:16

## 2023-03-28 RX ADMIN — VITAMINS A AND D OINTMENT 1 APPLICATION: 15.5; 53.4 OINTMENT TOPICAL at 08:15

## 2023-03-28 RX ADMIN — SACUBITRIL AND VALSARTAN 1 TABLET: 24; 26 TABLET, FILM COATED ORAL at 08:13

## 2023-03-28 RX ADMIN — APIXABAN 5 MG: 5 TABLET, FILM COATED ORAL at 21:16

## 2023-03-28 RX ADMIN — PANTOPRAZOLE SODIUM 40 MG: 40 TABLET, DELAYED RELEASE ORAL at 21:16

## 2023-03-28 RX ADMIN — FERROUS SULFATE TAB 325 MG (65 MG ELEMENTAL FE) 325 MG: 325 (65 FE) TAB at 08:10

## 2023-03-28 RX ADMIN — Medication 1 CAPSULE: at 08:14

## 2023-03-28 RX ADMIN — SPIRONOLACTONE 25 MG: 25 TABLET ORAL at 08:14

## 2023-03-28 RX ADMIN — ATORVASTATIN CALCIUM 10 MG: 10 TABLET, FILM COATED ORAL at 21:16

## 2023-03-28 RX ADMIN — METFORMIN HYDROCHLORIDE 1000 MG: 500 TABLET ORAL at 08:10

## 2023-03-28 RX ADMIN — GABAPENTIN 800 MG: 400 CAPSULE ORAL at 21:16

## 2023-03-28 RX ADMIN — AMPICILLIN SODIUM AND SULBACTAM SODIUM 3 G: 2; 1 INJECTION, POWDER, FOR SOLUTION INTRAMUSCULAR; INTRAVENOUS at 05:28

## 2023-03-28 RX ADMIN — AMPICILLIN SODIUM AND SULBACTAM SODIUM 3 G: 2; 1 INJECTION, POWDER, FOR SOLUTION INTRAMUSCULAR; INTRAVENOUS at 23:18

## 2023-03-28 RX ADMIN — DICYCLOMINE HYDROCHLORIDE 10 MG: 10 CAPSULE ORAL at 08:15

## 2023-03-28 RX ADMIN — BACLOFEN 30 MG: 10 TABLET ORAL at 21:16

## 2023-03-28 RX ADMIN — GABAPENTIN 800 MG: 400 CAPSULE ORAL at 16:15

## 2023-03-28 RX ADMIN — INSULIN ASPART 72 UNITS: 100 INJECTION, SOLUTION INTRAVENOUS; SUBCUTANEOUS at 11:50

## 2023-03-28 RX ADMIN — BACLOFEN 30 MG: 10 TABLET ORAL at 11:15

## 2023-03-28 NOTE — PLAN OF CARE
Goal Outcome Evaluation:      Pt has rested well this shift, no acute events. Pt VSS. Pt alert and oriented. Pt denies pain and has no complaints. Wound care done per orders this shift. Pt awaiting swing bed. Will continue plan of care.

## 2023-03-28 NOTE — PROGRESS NOTES
PROGRESS NOTE         Patient Identification:  Name:  Ken Krueger  Age:  47 y.o.  Sex:  male  :  1975  MRN:  [id number]  Visit Number:  [id number]  Primary Care Provider:  Franchesca Hodges APRN         LOS: 35 days       ----------------------------------------------------------------------------------------------------------------------  Subjective       Chief Complaints:    No chief complaint on file.      Interval History:      The patient remains on room air in no apparent distress.  Appears very comfortable and reports only a mild cough which is occasionally productive.  Lungs are very clear to auscultation bilaterally.  Abdomen is distended but soft, nontender, with normal bowel sounds.  Afebrile, no increased output from ostomy.  Leukocytosis is worsening with a WBC of 12.19.    Review of Systems:    Constitutional: No fever, chills. No night sweats. No appetite change or unexpected weight change. No fatigue.  Eyes: no eye drainage, itching or redness.  HEENT: no mouth sores, dysphagia or nose bleed.  Congestion.  Respiratory: no for shortness of breath.  Productive cough.  Cardiovascular: no chest pain, no palpitations, no orthopnea.  Gastrointestinal: no nausea, vomiting or diarrhea. No abdominal pain, hematemesis or rectal bleeding.  Genitourinary: no dysuria or polyuria.  Hematologic/lymphatic: no lymph node abnormalities, no easy bruising or easy bleeding.  Musculoskeletal: no muscle or joint pain.  Skin: No rash and no itching.  Neurological: no loss of consciousness, no seizure, no headache.  Behavioral/Psych: no depression or suicidal ideation.  Endocrine: no hot flashes.  Immunologic: negative.    ----------------------------------------------------------------------------------------------------------------------      Objective       Current Hospital Meds:    Pharmacy Consult - Pharmacy to dose,        ----------------------------------------------------------------------------------------------------------------------    Vital Signs:  Temp:  [97.9 °F (36.6 °C)-98.2 °F (36.8 °C)] 97.9 °F (36.6 °C)  Heart Rate:  [78-89] 78  Resp:  [18] 18  BP: (102-136)/(56-79) 102/56  No data found.  SpO2 Percentage    03/27/23 0643 03/27/23 1900 03/28/23 0656   SpO2: 98% 96% 94%     SpO2:  [94 %-96 %] 94 %  on   ;   Device (Oxygen Therapy): room air    Body mass index is 42.34 kg/m².  Wt Readings from Last 3 Encounters:   02/21/23 (!) 138 kg (303 lb 9.2 oz)   02/20/23 (!) 139 kg (306 lb 14.1 oz)   12/13/22 (!) 141 kg (310 lb 14.4 oz)        Intake/Output Summary (Last 24 hours) at 3/28/2023 0749  Last data filed at 3/28/2023 0300  Gross per 24 hour   Intake 2772.26 ml   Output 7000 ml   Net -4227.74 ml     Diet: Diabetic Diets; Consistent Carbohydrate; Texture: Regular Texture (IDDSI 7); Fluid Consistency: Thin (IDDSI 0)  ----------------------------------------------------------------------------------------------------------------------      Physical Exam:    Constitutional: Morbidly obese  male is sitting up in bed on room air in no apparent distress.  HENT:  Head: Normocephalic and atraumatic.  Mouth:  Moist mucous membranes.    Eyes:  Conjunctivae and EOM are normal.  No scleral icterus.  Neck:  Neck supple.  No JVD present.    Cardiovascular:  Normal rate, regular rhythm and normal heart sounds with no murmur. No edema.  Pulmonary/Chest:  No respiratory distress, with no wheezing, rhonchi, or rales.  Clear to auscultation bilaterally.  Abdominal:  Morbidly obese.  Soft.  Bowel sounds are normal.  No distension and no tenderness.  Ileal conduit in place.  Colostomy bag in place with soft stools.  Musculoskeletal:  No tenderness.  No swelling or redness of joints.  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 PICC to right upper arm with no signs of infection.  Neurological:  Alert and oriented to person, place, and time.  No  facial droop.  No slurred speech.   Skin:  Stage IV sacral decubitus wound.  Dressing in place.   Psychiatric:  Normal mood and affect.  Behavior is normal.      ----------------------------------------------------------------------------------------------------------------------      Results from last 7 days   Lab Units 03/26/23  1447   PROBNP pg/mL 1,009.0*         Results from last 7 days   Lab Units 03/28/23 0323 03/27/23 0713 03/26/23  1447   WBC 10*3/mm3 12.19* 10.96* 12.62*   HEMOGLOBIN g/dL 10.2* 10.7* 11.1*   HEMATOCRIT % 35.9* 37.1* 38.6   MCV fL 88.6 89.8 90.6   MCHC g/dL 28.4* 28.8* 28.8*   PLATELETS 10*3/mm3 319 317 346     Results from last 7 days   Lab Units 03/28/23 0323 03/27/23 0713 03/26/23  1447 03/25/23  0312 03/23/23  0117   SODIUM mmol/L 136 138 138   < > 137   POTASSIUM mmol/L 3.7 4.1 4.7   < > 4.0   MAGNESIUM mg/dL 1.9 2.0 2.1  --   --    CHLORIDE mmol/L 99 102 103   < > 102   CO2 mmol/L 25.3 24.0 23.7   < > 22.7   BUN mg/dL 48* 36* 35*   < > 49*   CREATININE mg/dL 1.13 0.88 0.91   < > 0.95   CALCIUM mg/dL 9.0 9.0 9.2   < > 9.3   GLUCOSE mg/dL 153* 155* 141*   < > 253*   ALBUMIN g/dL  --   --  3.2*  --  3.2*   BILIRUBIN mg/dL  --   --  <0.2  --  <0.2   ALK PHOS U/L  --   --  114  --  133*   AST (SGOT) U/L  --   --  43*  --  33   ALT (SGPT) U/L  --   --  41  --  46*    < > = values in this interval not displayed.   Estimated Creatinine Clearance: 116.8 mL/min (by C-G formula based on SCr of 1.13 mg/dL).  No results found for: AMMONIA    Glucose   Date/Time Value Ref Range Status   03/28/2023 0730 128 70 - 130 mg/dL Final     Comment:     Meter: [id number] : [id number] josey zoe   03/27/2023 2106 160 (H) 70 - 130 mg/dL Final     Comment:     Meter: [id number] : [id number] LONI WOO   03/27/2023 1633 136 (H) 70 - 130 mg/dL Final     Comment:     Meter: [id number] : [id number] Ciro SALAZAR   03/27/2023 1152 115 70 - 130 mg/dL Final     Comment:     Meter: [id number]  : [id number] josey enriquez   03/27/2023 0725 139 (H) 70 - 130 mg/dL Final     Comment:     Meter: [id number] : [id number] josey enriquez   03/26/2023 2120 163 (H) 70 - 130 mg/dL Final     Comment:     Meter: [id number] : [id number] Nessa Trejo   03/26/2023 1648 117 70 - 130 mg/dL Final     Comment:     Meter: [id number] : [id number] TREJO TREASURE   03/26/2023 1159 134 (H) 70 - 130 mg/dL Final     Comment:     Meter: [id number] : [id number] NEIL AMANDA     Lab Results   Component Value Date    HGBA1C 11.50 (H) 02/13/2023     Lab Results   Component Value Date    TSH 3.780 07/19/2022    FREET4 1.31 06/02/2021       Blood Culture   Date Value Ref Range Status   02/12/2023 No growth at 24 hours  Preliminary   02/12/2023 No growth at 24 hours  Preliminary     No results found for: URINECX  No results found for: WOUNDCX  No results found for: STOOLCX  No results found for: RESPCX  Pain Management Panel       Pain Management Panel Latest Ref Rng & Units 6/2/2021 8/19/2018    AMPHETAMINES SCREEN, URINE Negative Negative Negative    BARBITURATES SCREEN Negative Negative Negative    BENZODIAZEPINE SCREEN, URINE Negative Negative Negative    BUPRENORPHINEUR Negative Negative Negative    COCAINE SCREEN, URINE Negative Negative Negative    METHADONE SCREEN, URINE Negative Negative Negative              ----------------------------------------------------------------------------------------------------------------------  Imaging Results (Last 24 Hours)       ** No results found for the last 24 hours. **            -----------------------------------------------------------------------------------  Pertinent Infectious Disease Results     CT abdomen pelvis with contrast on 2/12/2023 showed there is a new decubitus ulcer just to the right of midline at the level of the coccyx with constellation of imaging findings most characteristic of acute infection/osteomyelitis. Small pocket of air and fluid adjacent to  the coccyx measuring 3.7 x 4.1 cm could reflect phlegmon or abscess. Chronic bilateral decubitus ulcers at the level of the ischial tuberosities bilaterally with imaging findings suggestive of chronic infection/osteomyelitis, similar to prior. Hepatic steatosis and hepatomegaly with borderline splenomegaly. Nonobstructing left renal stones. Postoperative changes of left lower quadrant and colostomy and right mid abdominal ileal conduit formation. Diverticulosis. No diverticulitis or bowel obstruction. COVID-19 and flu A/B PCR on 2/12/2023 was negative. Wound culture from 3/17/2023 finalized as pan susceptible Enterococcus faecalis and pan susceptible Enterococcus avium. Respiratory panel on 3/22/2023 was negative.  Chest x-ray on 3/22/2023 was unremarkable.    Status post excisional debridement of sacral ulcer on 2/13/2023 with Dr. Taylor.    Status post debridement with deep tissue culture with wound care GUANACO Handy on 3/17/2023.    Assessment/Plan         ASSESSMENT:    Septic shock with lactic acid greater than 4 on admission  Acute and chronic decubitus ulcerations with osteomyelitis and possible abscess      PLAN:    The patient remains on room air in no apparent distress.  Appears very comfortable and reports only a mild cough which is occasionally productive.  Lungs are very clear to auscultation bilaterally.  Abdomen is distended but soft, nontender, with normal bowel sounds.  Nurse reports that she changed the wound packing yesterday there was no overt purulence or malodor. Afebrile, no increased output from ostomy.  Leukocytosis is worsening with a WBC of 12.19.    Recommend to continue on Unasyn 3 g IV every 6 hours until 4/3/2023 for enterococcal sacral wound infection.  CRP and CBC ordered in a.m., as patient has rising leukocytosis but no clinical evidence of worsening sepsis at this time.  Will follow labs closely.    Code Status:   Code Status and Medical Interventions:   Ordered at:  02/21/23 1034     Code Status (Patient has no pulse and is not breathing):    CPR (Attempt to Resuscitate)     Medical Interventions (Patient has pulse or is breathing):    Full Support       Christy Caro PA-C  03/28/23  07:49 EDT

## 2023-03-28 NOTE — PLAN OF CARE
Goal Outcome Evaluation:  Plan of Care Reviewed With: patient        Progress: no change  Outcome Evaluation: Patient's VSS. Pt wound care done per orders. Pt voiced no further concerns at this time.

## 2023-03-29 LAB
BASOPHILS # BLD AUTO: 0.03 10*3/MM3 (ref 0–0.2)
BASOPHILS NFR BLD AUTO: 0.2 % (ref 0–1.5)
CRP SERPL-MCNC: 9.04 MG/DL (ref 0–0.5)
DEPRECATED RDW RBC AUTO: 47.4 FL (ref 37–54)
EOSINOPHIL # BLD AUTO: 0.18 10*3/MM3 (ref 0–0.4)
EOSINOPHIL NFR BLD AUTO: 1.4 % (ref 0.3–6.2)
ERYTHROCYTE [DISTWIDTH] IN BLOOD BY AUTOMATED COUNT: 14.8 % (ref 12.3–15.4)
GLUCOSE BLDC GLUCOMTR-MCNC: 155 MG/DL (ref 70–130)
GLUCOSE BLDC GLUCOMTR-MCNC: 240 MG/DL (ref 70–130)
GLUCOSE BLDC GLUCOMTR-MCNC: 260 MG/DL (ref 70–130)
GLUCOSE BLDC GLUCOMTR-MCNC: 273 MG/DL (ref 70–130)
GLUCOSE BLDC GLUCOMTR-MCNC: 275 MG/DL (ref 70–130)
HCT VFR BLD AUTO: 37.6 % (ref 37.5–51)
HGB BLD-MCNC: 10.9 G/DL (ref 13–17.7)
IMM GRANULOCYTES # BLD AUTO: 0.06 10*3/MM3 (ref 0–0.05)
IMM GRANULOCYTES NFR BLD AUTO: 0.5 % (ref 0–0.5)
LYMPHOCYTES # BLD AUTO: 2.12 10*3/MM3 (ref 0.7–3.1)
LYMPHOCYTES NFR BLD AUTO: 16 % (ref 19.6–45.3)
MCH RBC QN AUTO: 25.5 PG (ref 26.6–33)
MCHC RBC AUTO-ENTMCNC: 29 G/DL (ref 31.5–35.7)
MCV RBC AUTO: 87.9 FL (ref 79–97)
MONOCYTES # BLD AUTO: 0.53 10*3/MM3 (ref 0.1–0.9)
MONOCYTES NFR BLD AUTO: 4 % (ref 5–12)
NEUTROPHILS NFR BLD AUTO: 10.32 10*3/MM3 (ref 1.7–7)
NEUTROPHILS NFR BLD AUTO: 77.9 % (ref 42.7–76)
NRBC BLD AUTO-RTO: 0 /100 WBC (ref 0–0.2)
PLATELET # BLD AUTO: 396 10*3/MM3 (ref 140–450)
PMV BLD AUTO: 9.6 FL (ref 6–12)
RBC # BLD AUTO: 4.28 10*6/MM3 (ref 4.14–5.8)
WBC NRBC COR # BLD: 13.24 10*3/MM3 (ref 3.4–10.8)

## 2023-03-29 PROCEDURE — 85025 COMPLETE CBC W/AUTO DIFF WBC: CPT | Performed by: PHYSICIAN ASSISTANT

## 2023-03-29 PROCEDURE — 25010000002 ALTEPLASE 2 MG RECONSTITUTED SOLUTION 1 EACH VIAL: Performed by: HOSPITALIST

## 2023-03-29 PROCEDURE — 63710000001 INSULIN GLARGINE PER 5 UNITS: Performed by: HOSPITALIST

## 2023-03-29 PROCEDURE — 25010000002 AMPICILLIN-SULBACTAM PER 1.5 G: Performed by: INTERNAL MEDICINE

## 2023-03-29 PROCEDURE — 86140 C-REACTIVE PROTEIN: CPT | Performed by: PHYSICIAN ASSISTANT

## 2023-03-29 PROCEDURE — 99309 SBSQ NF CARE MODERATE MDM 30: CPT | Performed by: PHYSICIAN ASSISTANT

## 2023-03-29 PROCEDURE — 63710000001 INSULIN LISPRO (HUMAN) PER 5 UNITS: Performed by: HOSPITALIST

## 2023-03-29 PROCEDURE — 82962 GLUCOSE BLOOD TEST: CPT

## 2023-03-29 PROCEDURE — 99231 SBSQ HOSP IP/OBS SF/LOW 25: CPT | Performed by: HOSPITALIST

## 2023-03-29 RX ADMIN — OXYCODONE HYDROCHLORIDE AND ACETAMINOPHEN 500 MG: 500 TABLET ORAL at 09:07

## 2023-03-29 RX ADMIN — SACUBITRIL AND VALSARTAN 1 TABLET: 24; 26 TABLET, FILM COATED ORAL at 20:19

## 2023-03-29 RX ADMIN — DULOXETINE HYDROCHLORIDE 60 MG: 60 CAPSULE, DELAYED RELEASE ORAL at 20:19

## 2023-03-29 RX ADMIN — METFORMIN HYDROCHLORIDE 1000 MG: 500 TABLET ORAL at 09:07

## 2023-03-29 RX ADMIN — INSULIN LISPRO 4 UNITS: 100 INJECTION, SOLUTION INTRAVENOUS; SUBCUTANEOUS at 11:16

## 2023-03-29 RX ADMIN — BACLOFEN 30 MG: 10 TABLET ORAL at 20:19

## 2023-03-29 RX ADMIN — DICYCLOMINE HYDROCHLORIDE 10 MG: 10 CAPSULE ORAL at 09:07

## 2023-03-29 RX ADMIN — MODAFINIL 200 MG: 100 TABLET ORAL at 09:14

## 2023-03-29 RX ADMIN — BUMETANIDE 2 MG: 1 TABLET ORAL at 09:07

## 2023-03-29 RX ADMIN — AMPICILLIN SODIUM AND SULBACTAM SODIUM 3 G: 2; 1 INJECTION, POWDER, FOR SOLUTION INTRAMUSCULAR; INTRAVENOUS at 11:17

## 2023-03-29 RX ADMIN — GABAPENTIN 800 MG: 400 CAPSULE ORAL at 15:04

## 2023-03-29 RX ADMIN — CARVEDILOL 12.5 MG: 6.25 TABLET, FILM COATED ORAL at 09:07

## 2023-03-29 RX ADMIN — GABAPENTIN 800 MG: 400 CAPSULE ORAL at 09:07

## 2023-03-29 RX ADMIN — PANTOPRAZOLE SODIUM 40 MG: 40 TABLET, DELAYED RELEASE ORAL at 20:19

## 2023-03-29 RX ADMIN — INSULIN GLARGINE 40 UNITS: 100 INJECTION, SOLUTION SUBCUTANEOUS at 20:19

## 2023-03-29 RX ADMIN — Medication 10 ML: at 20:19

## 2023-03-29 RX ADMIN — AMPICILLIN SODIUM AND SULBACTAM SODIUM 3 G: 2; 1 INJECTION, POWDER, FOR SOLUTION INTRAMUSCULAR; INTRAVENOUS at 17:35

## 2023-03-29 RX ADMIN — SUCRALFATE 1 G: 1 TABLET ORAL at 09:07

## 2023-03-29 RX ADMIN — ARIPIPRAZOLE 10 MG: 10 TABLET ORAL at 20:19

## 2023-03-29 RX ADMIN — ATORVASTATIN CALCIUM 10 MG: 10 TABLET, FILM COATED ORAL at 20:19

## 2023-03-29 RX ADMIN — DAKIN'S SOLUTION 0.125% (QUARTER STRENGTH): 0.12 SOLUTION at 15:03

## 2023-03-29 RX ADMIN — Medication 10 ML: at 09:14

## 2023-03-29 RX ADMIN — MAGNESIUM GLUCONATE 500 MG ORAL TABLET 1200 MG: 500 TABLET ORAL at 09:07

## 2023-03-29 RX ADMIN — BACLOFEN 30 MG: 10 TABLET ORAL at 09:07

## 2023-03-29 RX ADMIN — INSULIN LISPRO 2 UNITS: 100 INJECTION, SOLUTION INTRAVENOUS; SUBCUTANEOUS at 09:08

## 2023-03-29 RX ADMIN — VITAMINS A AND D OINTMENT 1 APPLICATION: 15.5; 53.4 OINTMENT TOPICAL at 20:20

## 2023-03-29 RX ADMIN — PANTOPRAZOLE SODIUM 40 MG: 40 TABLET, DELAYED RELEASE ORAL at 09:07

## 2023-03-29 RX ADMIN — NYSTATIN: 100000 POWDER TOPICAL at 09:13

## 2023-03-29 RX ADMIN — DICYCLOMINE HYDROCHLORIDE 10 MG: 10 CAPSULE ORAL at 20:19

## 2023-03-29 RX ADMIN — SPIRONOLACTONE 25 MG: 25 TABLET ORAL at 09:07

## 2023-03-29 RX ADMIN — APIXABAN 5 MG: 5 TABLET, FILM COATED ORAL at 20:19

## 2023-03-29 RX ADMIN — AMPICILLIN SODIUM AND SULBACTAM SODIUM 3 G: 2; 1 INJECTION, POWDER, FOR SOLUTION INTRAMUSCULAR; INTRAVENOUS at 23:18

## 2023-03-29 RX ADMIN — FERROUS SULFATE TAB 325 MG (65 MG ELEMENTAL FE) 325 MG: 325 (65 FE) TAB at 09:07

## 2023-03-29 RX ADMIN — Medication 1 TABLET: at 09:07

## 2023-03-29 RX ADMIN — AMPICILLIN SODIUM AND SULBACTAM SODIUM 3 G: 2; 1 INJECTION, POWDER, FOR SOLUTION INTRAMUSCULAR; INTRAVENOUS at 04:05

## 2023-03-29 RX ADMIN — GABAPENTIN 800 MG: 400 CAPSULE ORAL at 20:19

## 2023-03-29 RX ADMIN — DULOXETINE HYDROCHLORIDE 60 MG: 60 CAPSULE, DELAYED RELEASE ORAL at 09:07

## 2023-03-29 RX ADMIN — EMPAGLIFLOZIN 10 MG: 10 TABLET, FILM COATED ORAL at 09:08

## 2023-03-29 RX ADMIN — METFORMIN HYDROCHLORIDE 1000 MG: 500 TABLET ORAL at 17:39

## 2023-03-29 RX ADMIN — Medication 1000 UNITS: at 09:08

## 2023-03-29 RX ADMIN — BACLOFEN 30 MG: 10 TABLET ORAL at 17:39

## 2023-03-29 RX ADMIN — INSULIN LISPRO 6 UNITS: 100 INJECTION, SOLUTION INTRAVENOUS; SUBCUTANEOUS at 17:36

## 2023-03-29 RX ADMIN — Medication 1 CAPSULE: at 09:07

## 2023-03-29 RX ADMIN — APIXABAN 5 MG: 5 TABLET, FILM COATED ORAL at 09:07

## 2023-03-29 RX ADMIN — NYSTATIN: 100000 POWDER TOPICAL at 20:20

## 2023-03-29 RX ADMIN — BACLOFEN 30 MG: 10 TABLET ORAL at 11:16

## 2023-03-29 RX ADMIN — SACUBITRIL AND VALSARTAN 1 TABLET: 24; 26 TABLET, FILM COATED ORAL at 09:07

## 2023-03-29 RX ADMIN — ALTEPLASE: 2.2 INJECTION, POWDER, LYOPHILIZED, FOR SOLUTION INTRAVENOUS at 04:40

## 2023-03-29 RX ADMIN — INSULIN GLARGINE 40 UNITS: 100 INJECTION, SOLUTION SUBCUTANEOUS at 11:15

## 2023-03-29 RX ADMIN — VITAMINS A AND D OINTMENT 1 APPLICATION: 15.5; 53.4 OINTMENT TOPICAL at 09:13

## 2023-03-29 NOTE — CASE MANAGEMENT/SOCIAL WORK
Discharge Planning Assessment   Fabricio     Patient Name: Ken Krueger  MRN: [id number]  Today's Date: 3/29/2023    Admit Date: 2/21/2023    Plan: Pt was admitted to Swing Bed on 2/21/23. SS was notifeid by Swing Bed Helder CHRISTIE via Secure Chat that pt has been approved for additional swing bed days through 4/4/23 with expected dishcarge date 4/5/23. SS to follow and assist with discharge needs.     Discharge Plan     Row Name 03/29/23 1140       Plan    Plan Pt was admitted to Swing Bed on 2/21/23. SS was notifeid by Swing Bed Helder CHRISTIE via Secure Chat that pt has been approved for additional swing bed days through 4/4/23 with expected dishcarge date 4/5/23. SS to follow and assist with discharge needs.                HUMPHREY Loya

## 2023-03-29 NOTE — CASE MANAGEMENT/SOCIAL WORK
Patient has been approved for additional swing bed days through 4/4/23. Provider and SS notified via secure chat.

## 2023-03-29 NOTE — PROGRESS NOTES
[facility] HOSPITALIST PROGRESS NOTE     Patient Identification:  Name:  Ken Krueger  Age:  47 y.o.  Sex:  male  :  1975  MRN:  [id number]  Visit Number:  [id number]  Primary Care Provider:  Franchesca Hodges APRN    Length of stay:  36    Subjective: Patient seen and examined, patient is awake alert currently on BiPAP, noted white count and CRP is trending up, patient noted to have a bottle on his bedside with phlegm and sputum, he reports no change from previous disease chronic.  X-ray done on  is negative for infiltrate.  X-ray requested.  Patient yesterday refused sliding scale by Glucomander, he also stated and told the nurse that he was if something happens to him because of hypoglycemia.  At his room he has multiple food from outside brought by family as well as none sugar-free beverages multiple bottles.  Patient is very nonchalant about the when brought up his attention the need to comply with diabetic diet to help with better diabetes control.  He is informed regarding deleterious effect of uncontrolled diabetes.  He appears to be not bothered by this    Chief Complaint: Sacral ulcer  ----------------------------------------------------------------------------------------------------------------------  Current Hospital Meds:  Acidophilus/Pectin, 1 capsule, Oral, Daily  ampicillin-sulbactam, 3 g, Intravenous, Q6H  apixaban, 5 mg, Oral, Q12H  ARIPiprazole, 10 mg, Oral, Nightly  ascorbic acid, 500 mg, Oral, Daily  atorvastatin, 10 mg, Oral, Nightly  baclofen, 30 mg, Oral, 4x Daily With Meals & Nightly  bumetanide, 2 mg, Oral, Daily  carvedilol, 12.5 mg, Oral, BID With Meals  cholecalciferol, 1,000 Units, Oral, Daily  dicyclomine, 10 mg, Oral, BID  DULoxetine, 60 mg, Oral, BID  empagliflozin, 10 mg, Oral, Daily  ferrous sulfate, 325 mg, Oral, Daily With Breakfast  gabapentin, 800 mg, Oral, TID  insulin detemir, 1-200 Units, Subcutaneous, BID - Glucommander  insulin lispro, 2-9  Units, Subcutaneous, TID With Meals  magic barrier cream, 1 application, Topical, BID  magnesium oxide, 1,200 mg, Oral, Daily  metFORMIN, 1,000 mg, Oral, BID With Meals  modafinil, 200 mg, Oral, Daily  multivitamin with minerals, 1 tablet, Oral, Daily  nystatin, , Topical, Q12H  pantoprazole, 40 mg, Oral, BID  sacubitril-valsartan, 1 tablet, Oral, Q12H  sodium chloride, 10 mL, Intravenous, Q12H  sodium hypochlorite, , Topical, Q12H  spironolactone, 25 mg, Oral, Daily  sucralfate, 1 g, Oral, Daily      Pharmacy Consult - Pharmacy to dose,       ----------------------------------------------------------------------------------------------------------------------  Vital Signs:  Temp:  [97.3 °F (36.3 °C)-97.8 °F (36.6 °C)] 97.3 °F (36.3 °C)  Heart Rate:  [78-85] (P) 85  Resp:  [22] 22  BP: (100-106)/(57-60) (P) 139/77       Tele:       02/21/23  1000   Weight: (!) 138 kg (303 lb 9.2 oz)     Body mass index is 42.34 kg/m².    Intake/Output Summary (Last 24 hours) at 3/29/2023 0950  Last data filed at 3/29/2023 0300  Gross per 24 hour   Intake 2086.4 ml   Output 3800 ml   Net -1713.6 ml     Diet: Diabetic Diets; Consistent Carbohydrate; Texture: Regular Texture (IDDSI 7); Fluid Consistency: Thin (IDDSI 0)  ----------------------------------------------------------------------------------------------------------------------  Physical exam:  General: Morbidly obese conversant, comfortable,awake, alert, oriented to self, place, and time, well-developed  No respiratory distress.    Skin:  Skin is warm and dry. No rash noted. No pallor.    HENT:  Head:  Normocephalic and atraumatic.  Mouth:  Moist mucous membranes.    Eyes:  Conjunctivae and EOM are normal.  Pupils are equal, round, and reactive to light.  No scleral icterus.    Neck:  Neck supple.  No JVD present.    Pulmonary/Chest:  No respiratory distress, no wheezes, no crackles, with normal breath sounds and good air movement.  Cardiovascular:  Normal rate, regular  rhythm and normal heart sounds with no murmur.  Abdominal: Obese, left-sided colostomy with watery stools, right-sided ileal conduit with clear elvin urine soft.  Bowel sounds are normal.  No distension and no tenderness.   Extremities: Right above-knee amputee,   Neurological: Patient is paraplegic No cranial nerve deficit.  No tongue deviation.  No facial droop.  No slurred speech.    Genitourinary: Right-sided urostomy?    Back: Sacral ulcer with packing  ----------------------------------------------------------------------------------------------------------------------  ----------------------------------------------------------------------------------------------------------------------      Results from last 7 days   Lab Units 03/29/23  0112 03/28/23 0323 03/27/23  0713   CRP mg/dL 9.04*  --   --    WBC 10*3/mm3 13.24* 12.19* 10.96*   HEMOGLOBIN g/dL 10.9* 10.2* 10.7*   HEMATOCRIT % 37.6 35.9* 37.1*   MCV fL 87.9 88.6 89.8   MCHC g/dL 29.0* 28.4* 28.8*   PLATELETS 10*3/mm3 396 319 317         Results from last 7 days   Lab Units 03/28/23  0323 03/27/23  0713 03/26/23  1447 03/25/23  0312 03/23/23  0117   SODIUM mmol/L 136 138 138   < > 137   POTASSIUM mmol/L 3.7 4.1 4.7   < > 4.0   MAGNESIUM mg/dL 1.9 2.0 2.1  --   --    CHLORIDE mmol/L 99 102 103   < > 102   CO2 mmol/L 25.3 24.0 23.7   < > 22.7   BUN mg/dL 48* 36* 35*   < > 49*   CREATININE mg/dL 1.13 0.88 0.91   < > 0.95   CALCIUM mg/dL 9.0 9.0 9.2   < > 9.3   GLUCOSE mg/dL 153* 155* 141*   < > 253*   ALBUMIN g/dL  --   --  3.2*  --  3.2*   BILIRUBIN mg/dL  --   --  <0.2  --  <0.2   ALK PHOS U/L  --   --  114  --  133*   AST (SGOT) U/L  --   --  43*  --  33   ALT (SGPT) U/L  --   --  41  --  46*    < > = values in this interval not displayed.   Estimated Creatinine Clearance: 116.8 mL/min (by C-G formula based on SCr of 1.13 mg/dL).    No results found for: AMMONIA      No results found for: BLOODCX  No results found for: URINECX  No results found for:  WOUNDCX  No results found for: STOOLCX    I have personally looked at the labs and they are summarized above.  ----------------------------------------------------------------------------------------------------------------------  Imaging Results (Last 24 Hours)     Procedure Component Value Units Date/Time    XR Chest 1 View [[id number]] Resulted: 03/29/23 0912     Updated: 03/29/23 0912        ----------------------------------------------------------------------------------------------------------------------  Assessment and Plan:  -Sepsis present on admission secondary to acute osteomyelitis of the sacrum  -Diabetes poorly controlled receiving significant amount of insulin in able to control  -Significant noncompliance with diabetic diet regimen  -Morbidly obese complicating all aspects of care  -History of DVT and PE on chronic anticoagulation  -Paraplegic posttraumatic  -Large sacral decubitus ulcer  -Obstructive sleep apnea on CPAP  -History of nonischemic cardiomyopathy  -Status post colostomy and ileal conduit? secondary to paraplegia    Patient has declined Glucomander protocol insulin coverage, although Glucomander sliding scale management control his diabetes he also is actively not applying any diet restriction, currently he has multiple food from outside including beverages that are not sugar-free.  Patient has counseled multiple times the importance of compliance.  Of concern is patient's CRP is also starting to increase as does his white count, no fever, patient is coughing with yellowish sputum which he claims is chronic, will review chest x-ray.  In the meantime he appears to be edematous, will give IV diuretics instead of p.o.    For now for the diabetes we will restart his twice a day Levemir, Accu-Chek and sliding scale.    Disposition continue [facility]      Yovana Pack MD  03/29/23  09:50 EDT

## 2023-03-29 NOTE — PLAN OF CARE
Goal Outcome Evaluation:              Outcome Evaluation: Pt is resting in bed at this time. Wound care completed per orders. IV abx given per MAR. No acute changes noted at this time. Will continue plan of care.

## 2023-03-29 NOTE — PROGRESS NOTES
Nutrition Services    Patient Name:  Ken Krueger  YOB: 1975  MRN: [id number]  Admit Date:  2/21/2023    Swing bed note :  Diet - consistent carb - regular texture - thin liquids - providing high protein and neal supplement bid to promote wound healing - PO averaging 90-95% with improved fluid intake - average 2040ml po - Nutritionally stable - patient has refused diet education    Electronically signed by:  Mary Solares RD  03/29/23 15:13 EDT

## 2023-03-29 NOTE — PROGRESS NOTES
PROGRESS NOTE         Patient Identification:  Name:  Ken Krueger  Age:  47 y.o.  Sex:  male  :  1975  MRN:  [id number]  Visit Number:  [id number]  Primary Care Provider:  Franchesca Hodges APRN         LOS: 36 days       ----------------------------------------------------------------------------------------------------------------------  Subjective       Chief Complaints:    No chief complaint on file.      Interval History:      The patient is resting comfortably on CPAP in no apparent distress.  No new issues or complaints at this time.  Lungs are diminished bilaterally abdomen is distended, but soft, nontender, with normal bowel sounds.  Afebrile, with no increased output from ostomy.  Nurse reports no issues overnight.  CRP is worsened at 9.04.  WBC is worsened at 13.24.    Review of Systems:    Constitutional: No fever, chills. No night sweats. No appetite change or unexpected weight change. No fatigue.  Eyes: no eye drainage, itching or redness.  HEENT: no mouth sores, dysphagia or nose bleed.  Congestion.  Respiratory: no for shortness of breath.  Productive cough.  Cardiovascular: no chest pain, no palpitations, no orthopnea.  Gastrointestinal: no nausea, vomiting or diarrhea. No abdominal pain, hematemesis or rectal bleeding.  Genitourinary: no dysuria or polyuria.  Hematologic/lymphatic: no lymph node abnormalities, no easy bruising or easy bleeding.  Musculoskeletal: no muscle or joint pain.  Skin: No rash and no itching.  Neurological: no loss of consciousness, no seizure, no headache.  Behavioral/Psych: no depression or suicidal ideation.  Endocrine: no hot flashes.  Immunologic: negative.    ----------------------------------------------------------------------------------------------------------------------      Objective       Current [facility] Meds:    Pharmacy Consult - Pharmacy to dose,        ----------------------------------------------------------------------------------------------------------------------    Vital Signs:  Temp:  [97.3 °F (36.3 °C)-97.8 °F (36.6 °C)] 97.3 °F (36.3 °C)  Heart Rate:  [78-82] 80  Resp:  [22] 22  BP: (100-106)/(57-60) 106/59  No data found.  SpO2 Percentage    03/27/23 1900 03/28/23 0656 03/28/23 1849   SpO2: 96% 94% 96%     SpO2:  [96 %] 96 %  on   ;   Device (Oxygen Therapy): room air    Body mass index is 42.34 kg/m².  Wt Readings from Last 3 Encounters:   02/21/23 (!) 138 kg (303 lb 9.2 oz)   02/20/23 (!) 139 kg (306 lb 14.1 oz)   12/13/22 (!) 141 kg (310 lb 14.4 oz)        Intake/Output Summary (Last 24 hours) at 3/29/2023 0848  Last data filed at 3/29/2023 0300  Gross per 24 hour   Intake 2446.4 ml   Output 4500 ml   Net -2053.6 ml     Diet: Diabetic Diets; Consistent Carbohydrate; Texture: Regular Texture (IDDSI 7); Fluid Consistency: Thin (IDDSI 0)  ----------------------------------------------------------------------------------------------------------------------      Physical Exam:    Constitutional: Morbidly obese  male is sitting up in bed on room air in no apparent distress.  HENT:  Head: Normocephalic and atraumatic.  Mouth:  Moist mucous membranes.    Eyes:  Conjunctivae and EOM are normal.  No scleral icterus.  Neck:  Neck supple.  No JVD present.    Cardiovascular:  Normal rate, regular rhythm and normal heart sounds with no murmur. No edema.  Pulmonary/Chest:  No respiratory distress, with no wheezing, rhonchi, or rales.  Diminished breath sounds bilaterally.  Abdominal:  Morbidly obese.  Soft.  Bowel sounds are normal.  No distension and no tenderness.  Ileal conduit in place.  Colostomy bag in place with soft stools.  Musculoskeletal:  No tenderness.  No swelling or redness of joints.  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 PICC to right upper arm with no signs of infection.  Neurological:  Alert and oriented to person, place, and time.  No facial  droop.  No slurred speech.   Skin:  Stage IV sacral decubitus wound.  Dressing in place.   Psychiatric:  Normal mood and affect.  Behavior is normal.      ----------------------------------------------------------------------------------------------------------------------      Results from last 7 days   Lab Units 03/26/23  1447   PROBNP pg/mL 1,009.0*         Results from last 7 days   Lab Units 03/29/23  0112 03/28/23 0323 03/27/23 0713   CRP mg/dL 9.04*  --   --    WBC 10*3/mm3 13.24* 12.19* 10.96*   HEMOGLOBIN g/dL 10.9* 10.2* 10.7*   HEMATOCRIT % 37.6 35.9* 37.1*   MCV fL 87.9 88.6 89.8   MCHC g/dL 29.0* 28.4* 28.8*   PLATELETS 10*3/mm3 396 319 317     Results from last 7 days   Lab Units 03/28/23 0323 03/27/23 0713 03/26/23  1447 03/25/23  0312 03/23/23  0117   SODIUM mmol/L 136 138 138   < > 137   POTASSIUM mmol/L 3.7 4.1 4.7   < > 4.0   MAGNESIUM mg/dL 1.9 2.0 2.1  --   --    CHLORIDE mmol/L 99 102 103   < > 102   CO2 mmol/L 25.3 24.0 23.7   < > 22.7   BUN mg/dL 48* 36* 35*   < > 49*   CREATININE mg/dL 1.13 0.88 0.91   < > 0.95   CALCIUM mg/dL 9.0 9.0 9.2   < > 9.3   GLUCOSE mg/dL 153* 155* 141*   < > 253*   ALBUMIN g/dL  --   --  3.2*  --  3.2*   BILIRUBIN mg/dL  --   --  <0.2  --  <0.2   ALK PHOS U/L  --   --  114  --  133*   AST (SGOT) U/L  --   --  43*  --  33   ALT (SGPT) U/L  --   --  41  --  46*    < > = values in this interval not displayed.   Estimated Creatinine Clearance: 116.8 mL/min (by C-G formula based on SCr of 1.13 mg/dL).  No results found for: AMMONIA    Glucose   Date/Time Value Ref Range Status   03/29/2023 0702 155 (H) 70 - 130 mg/dL Final     Comment:     Meter: [id number] : [id number] CINDY STRONG   03/28/2023 2024 186 (H) 70 - 130 mg/dL Final     Comment:     Meter: [id number] : [id number] WILBER ELZA   03/28/2023 1631 160 (H) 70 - 130 mg/dL Final     Comment:     Meter: [id number] : [id number] josey enriquez   03/28/2023 1127 256 (H) 70 - 130 mg/dL Final     Comment:      Meter: [id number] : [id number] josey enriquez   03/28/2023 0730 128 70 - 130 mg/dL Final     Comment:     Meter: [id number] : [id number] josey enriquez   03/27/2023 2106 160 (H) 70 - 130 mg/dL Final     Comment:     Meter: [id number] : [id number] LONI WOO   03/27/2023 1633 136 (H) 70 - 130 mg/dL Final     Comment:     Meter: [id number] : [id number] Ciro SALAZAR   03/27/2023 1152 115 70 - 130 mg/dL Final     Comment:     Meter: [id number] : [id number] josey enriquez     Lab Results   Component Value Date    HGBA1C 11.50 (H) 02/13/2023     Lab Results   Component Value Date    TSH 3.780 07/19/2022    FREET4 1.31 06/02/2021       Blood Culture   Date Value Ref Range Status   02/12/2023 No growth at 24 hours  Preliminary   02/12/2023 No growth at 24 hours  Preliminary     No results found for: URINECX  No results found for: WOUNDCX  No results found for: STOOLCX  No results found for: RESPCX  Pain Management Panel       Pain Management Panel Latest Ref Rng & Units 6/2/2021 8/19/2018    AMPHETAMINES SCREEN, URINE Negative Negative Negative    BARBITURATES SCREEN Negative Negative Negative    BENZODIAZEPINE SCREEN, URINE Negative Negative Negative    BUPRENORPHINEUR Negative Negative Negative    COCAINE SCREEN, URINE Negative Negative Negative    METHADONE SCREEN, URINE Negative Negative Negative              ----------------------------------------------------------------------------------------------------------------------  Imaging Results (Last 24 Hours)       ** No results found for the last 24 hours. **            -----------------------------------------------------------------------------------  Pertinent Infectious Disease Results     CT abdomen pelvis with contrast on 2/12/2023 showed there is a new decubitus ulcer just to the right of midline at the level of the coccyx with constellation of imaging findings most characteristic of acute infection/osteomyelitis. Small pocket of air and  fluid adjacent to the coccyx measuring 3.7 x 4.1 cm could reflect phlegmon or abscess. Chronic bilateral decubitus ulcers at the level of the ischial tuberosities bilaterally with imaging findings suggestive of chronic infection/osteomyelitis, similar to prior. Hepatic steatosis and hepatomegaly with borderline splenomegaly. Nonobstructing left renal stones. Postoperative changes of left lower quadrant and colostomy and right mid abdominal ileal conduit formation. Diverticulosis. No diverticulitis or bowel obstruction. COVID-19 and flu A/B PCR on 2/12/2023 was negative. Wound culture from 3/17/2023 finalized as pan susceptible Enterococcus faecalis and pan susceptible Enterococcus avium. Respiratory panel on 3/22/2023 was negative.  Chest x-ray on 3/22/2023 was unremarkable.    Status post excisional debridement of sacral ulcer on 2/13/2023 with Dr. Taylor.    Status post debridement with deep tissue culture with wound care GUANACO Handy on 3/17/2023.    Assessment/Plan         ASSESSMENT:    Septic shock with lactic acid greater than 4 on admission  Acute and chronic decubitus ulcerations with osteomyelitis and possible abscess      PLAN:    I examined the patient this morning and he is resting comfortably on CPAP in no apparent distress.  No new issues or complaints at this time.  Lungs are diminished bilaterally abdomen is distended, but soft, nontender, with normal bowel sounds.  Afebrile, with no increased output from ostomy.  Nurse reports no issues overnight.  CRP is worsened at 9.04.  WBC is worsened at 13.24.    Recommend to continue on Unasyn 3 g IV every 6 hours until 4/3/2023 for enterococcal sacral wound infection.  Worsening CRP and leukocytosis are concerning and as patient has complained of productive cough with diminished breath sounds bilaterally, chest x-ray was ordered today.  If chest x-ray shows no evidence of pneumonia, would recommend repeat sacral wound cultures, as that would be  another likely source of worsening infection.    Code Status:   Code Status and Medical Interventions:   Ordered at: 02/21/23 1034     Code Status (Patient has no pulse and is not breathing):    CPR (Attempt to Resuscitate)     Medical Interventions (Patient has pulse or is breathing):    Full Support       Christy Caro PA-C  03/29/23  08:48 EDT

## 2023-03-29 NOTE — PHARMACY PATIENT ASSISTANCE
Pharmacy checked on coverage/cost of entresto/eliquis on recent previous encounter and neither will have copays.    Thank You;  Laisha Cabrera, PharmD  03/29/23  10:37 EDT

## 2023-03-30 LAB
BASOPHILS # BLD AUTO: 0.03 10*3/MM3 (ref 0–0.2)
BASOPHILS NFR BLD AUTO: 0.3 % (ref 0–1.5)
CRP SERPL-MCNC: 8.11 MG/DL (ref 0–0.5)
DEPRECATED RDW RBC AUTO: 47.4 FL (ref 37–54)
EOSINOPHIL # BLD AUTO: 0.15 10*3/MM3 (ref 0–0.4)
EOSINOPHIL NFR BLD AUTO: 1.5 % (ref 0.3–6.2)
ERYTHROCYTE [DISTWIDTH] IN BLOOD BY AUTOMATED COUNT: 14.7 % (ref 12.3–15.4)
GLUCOSE BLDC GLUCOMTR-MCNC: 241 MG/DL (ref 70–130)
GLUCOSE BLDC GLUCOMTR-MCNC: 270 MG/DL (ref 70–130)
GLUCOSE BLDC GLUCOMTR-MCNC: 271 MG/DL (ref 70–130)
GLUCOSE BLDC GLUCOMTR-MCNC: 299 MG/DL (ref 70–130)
HCT VFR BLD AUTO: 35.3 % (ref 37.5–51)
HGB BLD-MCNC: 10.2 G/DL (ref 13–17.7)
HYPOCHROMIA BLD QL: NORMAL
IMM GRANULOCYTES # BLD AUTO: 0.06 10*3/MM3 (ref 0–0.05)
IMM GRANULOCYTES NFR BLD AUTO: 0.6 % (ref 0–0.5)
LYMPHOCYTES # BLD AUTO: 1.58 10*3/MM3 (ref 0.7–3.1)
LYMPHOCYTES NFR BLD AUTO: 15.8 % (ref 19.6–45.3)
MCH RBC QN AUTO: 25.6 PG (ref 26.6–33)
MCHC RBC AUTO-ENTMCNC: 28.9 G/DL (ref 31.5–35.7)
MCV RBC AUTO: 88.5 FL (ref 79–97)
MONOCYTES # BLD AUTO: 0.46 10*3/MM3 (ref 0.1–0.9)
MONOCYTES NFR BLD AUTO: 4.6 % (ref 5–12)
NEUTROPHILS NFR BLD AUTO: 7.7 10*3/MM3 (ref 1.7–7)
NEUTROPHILS NFR BLD AUTO: 77.2 % (ref 42.7–76)
NRBC BLD AUTO-RTO: 0 /100 WBC (ref 0–0.2)
PLAT MORPH BLD: NORMAL
PLATELET # BLD AUTO: 335 10*3/MM3 (ref 140–450)
PMV BLD AUTO: 9.6 FL (ref 6–12)
POLYCHROMASIA BLD QL SMEAR: NORMAL
RBC # BLD AUTO: 3.99 10*6/MM3 (ref 4.14–5.8)
WBC NRBC COR # BLD: 9.98 10*3/MM3 (ref 3.4–10.8)

## 2023-03-30 PROCEDURE — 63710000001 INSULIN GLARGINE PER 5 UNITS: Performed by: HOSPITALIST

## 2023-03-30 PROCEDURE — 99309 SBSQ NF CARE MODERATE MDM 30: CPT | Performed by: NURSE PRACTITIONER

## 2023-03-30 PROCEDURE — 25010000002 AMPICILLIN-SULBACTAM PER 1.5 G: Performed by: INTERNAL MEDICINE

## 2023-03-30 PROCEDURE — 63710000001 INSULIN LISPRO (HUMAN) PER 5 UNITS: Performed by: HOSPITALIST

## 2023-03-30 PROCEDURE — 82962 GLUCOSE BLOOD TEST: CPT

## 2023-03-30 PROCEDURE — 85007 BL SMEAR W/DIFF WBC COUNT: CPT | Performed by: PHYSICIAN ASSISTANT

## 2023-03-30 PROCEDURE — 85025 COMPLETE CBC W/AUTO DIFF WBC: CPT | Performed by: PHYSICIAN ASSISTANT

## 2023-03-30 PROCEDURE — 86140 C-REACTIVE PROTEIN: CPT | Performed by: PHYSICIAN ASSISTANT

## 2023-03-30 RX ADMIN — BACLOFEN 30 MG: 10 TABLET ORAL at 17:07

## 2023-03-30 RX ADMIN — VITAMINS A AND D OINTMENT 1 APPLICATION: 15.5; 53.4 OINTMENT TOPICAL at 08:25

## 2023-03-30 RX ADMIN — Medication 1 CAPSULE: at 08:19

## 2023-03-30 RX ADMIN — BACLOFEN 30 MG: 10 TABLET ORAL at 12:01

## 2023-03-30 RX ADMIN — BACLOFEN 30 MG: 10 TABLET ORAL at 20:34

## 2023-03-30 RX ADMIN — AMPICILLIN SODIUM AND SULBACTAM SODIUM 3 G: 2; 1 INJECTION, POWDER, FOR SOLUTION INTRAMUSCULAR; INTRAVENOUS at 22:05

## 2023-03-30 RX ADMIN — GABAPENTIN 800 MG: 400 CAPSULE ORAL at 20:34

## 2023-03-30 RX ADMIN — GABAPENTIN 800 MG: 400 CAPSULE ORAL at 15:41

## 2023-03-30 RX ADMIN — DICYCLOMINE HYDROCHLORIDE 10 MG: 10 CAPSULE ORAL at 20:34

## 2023-03-30 RX ADMIN — PANTOPRAZOLE SODIUM 40 MG: 40 TABLET, DELAYED RELEASE ORAL at 20:34

## 2023-03-30 RX ADMIN — SACUBITRIL AND VALSARTAN 1 TABLET: 24; 26 TABLET, FILM COATED ORAL at 08:26

## 2023-03-30 RX ADMIN — INSULIN LISPRO 4 UNITS: 100 INJECTION, SOLUTION INTRAVENOUS; SUBCUTANEOUS at 08:18

## 2023-03-30 RX ADMIN — MAGNESIUM GLUCONATE 500 MG ORAL TABLET 1200 MG: 500 TABLET ORAL at 08:24

## 2023-03-30 RX ADMIN — INSULIN LISPRO 6 UNITS: 100 INJECTION, SOLUTION INTRAVENOUS; SUBCUTANEOUS at 17:07

## 2023-03-30 RX ADMIN — Medication 1 TABLET: at 08:27

## 2023-03-30 RX ADMIN — Medication 10 ML: at 20:36

## 2023-03-30 RX ADMIN — ARIPIPRAZOLE 10 MG: 10 TABLET ORAL at 20:34

## 2023-03-30 RX ADMIN — EMPAGLIFLOZIN 10 MG: 10 TABLET, FILM COATED ORAL at 08:23

## 2023-03-30 RX ADMIN — ATORVASTATIN CALCIUM 10 MG: 10 TABLET, FILM COATED ORAL at 20:34

## 2023-03-30 RX ADMIN — INSULIN GLARGINE 50 UNITS: 100 INJECTION, SOLUTION SUBCUTANEOUS at 20:34

## 2023-03-30 RX ADMIN — BACLOFEN 30 MG: 10 TABLET ORAL at 08:14

## 2023-03-30 RX ADMIN — AMPICILLIN SODIUM AND SULBACTAM SODIUM 3 G: 2; 1 INJECTION, POWDER, FOR SOLUTION INTRAMUSCULAR; INTRAVENOUS at 10:23

## 2023-03-30 RX ADMIN — NYSTATIN: 100000 POWDER TOPICAL at 20:36

## 2023-03-30 RX ADMIN — SPIRONOLACTONE 25 MG: 25 TABLET ORAL at 08:26

## 2023-03-30 RX ADMIN — INSULIN LISPRO 6 UNITS: 100 INJECTION, SOLUTION INTRAVENOUS; SUBCUTANEOUS at 12:00

## 2023-03-30 RX ADMIN — DULOXETINE HYDROCHLORIDE 60 MG: 60 CAPSULE, DELAYED RELEASE ORAL at 20:34

## 2023-03-30 RX ADMIN — METFORMIN HYDROCHLORIDE 1000 MG: 500 TABLET ORAL at 17:07

## 2023-03-30 RX ADMIN — METFORMIN HYDROCHLORIDE 1000 MG: 500 TABLET ORAL at 08:16

## 2023-03-30 RX ADMIN — OXYCODONE HYDROCHLORIDE AND ACETAMINOPHEN 500 MG: 500 TABLET ORAL at 08:21

## 2023-03-30 RX ADMIN — Medication 1000 UNITS: at 08:21

## 2023-03-30 RX ADMIN — GABAPENTIN 800 MG: 400 CAPSULE ORAL at 08:33

## 2023-03-30 RX ADMIN — FERROUS SULFATE TAB 325 MG (65 MG ELEMENTAL FE) 325 MG: 325 (65 FE) TAB at 08:17

## 2023-03-30 RX ADMIN — INSULIN GLARGINE 50 UNITS: 100 INJECTION, SOLUTION SUBCUTANEOUS at 08:18

## 2023-03-30 RX ADMIN — Medication 10 ML: at 08:26

## 2023-03-30 RX ADMIN — AMPICILLIN SODIUM AND SULBACTAM SODIUM 3 G: 2; 1 INJECTION, POWDER, FOR SOLUTION INTRAMUSCULAR; INTRAVENOUS at 17:08

## 2023-03-30 RX ADMIN — AMPICILLIN SODIUM AND SULBACTAM SODIUM 3 G: 2; 1 INJECTION, POWDER, FOR SOLUTION INTRAMUSCULAR; INTRAVENOUS at 04:07

## 2023-03-30 RX ADMIN — SACUBITRIL AND VALSARTAN 1 TABLET: 24; 26 TABLET, FILM COATED ORAL at 20:34

## 2023-03-30 RX ADMIN — SUCRALFATE 1 G: 1 TABLET ORAL at 08:26

## 2023-03-30 RX ADMIN — BUMETANIDE 2 MG: 1 TABLET ORAL at 08:20

## 2023-03-30 RX ADMIN — NYSTATIN: 100000 POWDER TOPICAL at 08:25

## 2023-03-30 RX ADMIN — CARVEDILOL 12.5 MG: 6.25 TABLET, FILM COATED ORAL at 08:17

## 2023-03-30 RX ADMIN — DICYCLOMINE HYDROCHLORIDE 10 MG: 10 CAPSULE ORAL at 08:27

## 2023-03-30 RX ADMIN — APIXABAN 5 MG: 5 TABLET, FILM COATED ORAL at 08:21

## 2023-03-30 RX ADMIN — PANTOPRAZOLE SODIUM 40 MG: 40 TABLET, DELAYED RELEASE ORAL at 08:25

## 2023-03-30 RX ADMIN — DULOXETINE HYDROCHLORIDE 60 MG: 60 CAPSULE, DELAYED RELEASE ORAL at 08:23

## 2023-03-30 RX ADMIN — APIXABAN 5 MG: 5 TABLET, FILM COATED ORAL at 20:34

## 2023-03-30 RX ADMIN — MODAFINIL 200 MG: 100 TABLET ORAL at 08:33

## 2023-03-30 RX ADMIN — VITAMINS A AND D OINTMENT 1 APPLICATION: 15.5; 53.4 OINTMENT TOPICAL at 20:37

## 2023-03-30 RX ADMIN — DAKIN'S SOLUTION 0.125% (QUARTER STRENGTH): 0.12 SOLUTION at 13:54

## 2023-03-30 NOTE — PROGRESS NOTES
PROGRESS NOTE         Patient Identification:  Name:  Ken Krueger  Age:  47 y.o.  Sex:  male  :  1975  MRN:  [id number]  Visit Number:  [id number]  Primary Care Provider:  Franchesca Hodges APRN         LOS: 37 days       ----------------------------------------------------------------------------------------------------------------------  Subjective       Chief Complaints:    No chief complaint on file.      Interval History:      Patient resting in bed this morning.  No issues or complaints.  Currently on room air this morning with no apparent distress.  Afebrile, no increase in ostomy output.  WBC normal at 9.98.  CRP improved at 8.11.  Chest x-ray from 3/29/2023 reports no evidence of acute cardiac or pulmonary disease.    Review of Systems:    Constitutional: No fever, chills. No night sweats. No appetite change or unexpected weight change. No fatigue.  Eyes: no eye drainage, itching or redness.  HEENT: no mouth sores, dysphagia or nose bleed.  Congestion.  Respiratory: no for shortness of breath.  Productive cough.  Cardiovascular: no chest pain, no palpitations, no orthopnea.  Gastrointestinal: no nausea, vomiting or diarrhea. No abdominal pain, hematemesis or rectal bleeding.  Genitourinary: no dysuria or polyuria.  Hematologic/lymphatic: no lymph node abnormalities, no easy bruising or easy bleeding.  Musculoskeletal: no muscle or joint pain.  Skin: No rash and no itching.  Neurological: no loss of consciousness, no seizure, no headache.  Behavioral/Psych: no depression or suicidal ideation.  Endocrine: no hot flashes.  Immunologic: negative.    ----------------------------------------------------------------------------------------------------------------------      Objective       Current Hospital Meds:    Pharmacy Consult - Pharmacy to dose,       ----------------------------------------------------------------------------------------------------------------------    Vital Signs:  Temp:   [96.8 °F (36 °C)-97.9 °F (36.6 °C)] 96.8 °F (36 °C)  Heart Rate:  [81-88] 82  Resp:  [20] 20  BP: ()/(50-76) 130/76  No data found.  SpO2 Percentage    03/28/23 0656 03/28/23 1849 03/29/23 1900   SpO2: 94% 96% 96%     SpO2:  [96 %] 96 %  on   ;   Device (Oxygen Therapy): room air    Body mass index is 42.34 kg/m².  Wt Readings from Last 3 Encounters:   02/21/23 (!) 138 kg (303 lb 9.2 oz)   02/20/23 (!) 139 kg (306 lb 14.1 oz)   12/13/22 (!) 141 kg (310 lb 14.4 oz)        Intake/Output Summary (Last 24 hours) at 3/30/2023 1305  Last data filed at 3/30/2023 0900  Gross per 24 hour   Intake 382.95 ml   Output 5700 ml   Net -5317.05 ml     Diet: Diabetic Diets; Consistent Carbohydrate; Texture: Regular Texture (IDDSI 7); Fluid Consistency: Thin (IDDSI 0)  ----------------------------------------------------------------------------------------------------------------------      Physical Exam:    Constitutional: Morbidly obese  male is sitting up in bed on room air in no apparent distress.  HENT:  Head: Normocephalic and atraumatic.  Mouth:  Moist mucous membranes.    Eyes:  Conjunctivae and EOM are normal.  No scleral icterus.  Neck:  Neck supple.  No JVD present.    Cardiovascular:  Normal rate, regular rhythm and normal heart sounds with no murmur. No edema.  Pulmonary/Chest:  No respiratory distress, with no wheezing, rhonchi, or rales.  Clear to auscultation bilaterally.  Abdominal:  Morbidly obese.  Soft.  Bowel sounds are normal.  No distension and no tenderness.  Ileal conduit in place.  Colostomy bag in place with soft stools.  Musculoskeletal:  No tenderness.  No swelling or redness of joints.  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 PICC to right upper arm with no signs of infection.  Neurological:  Alert and oriented to person, place, and time.  No facial droop.  No slurred speech.   Skin:  Stage IV sacral decubitus wound.  Dressing in place.   Psychiatric:  Normal mood and affect.  Behavior is  normal.      ----------------------------------------------------------------------------------------------------------------------      Results from last 7 days   Lab Units 03/26/23  1447   PROBNP pg/mL 1,009.0*         Results from last 7 days   Lab Units 03/30/23  0159 03/29/23  0112 03/28/23  0323   CRP mg/dL 8.11* 9.04*  --    WBC 10*3/mm3 9.98 13.24* 12.19*   HEMOGLOBIN g/dL 10.2* 10.9* 10.2*   HEMATOCRIT % 35.3* 37.6 35.9*   MCV fL 88.5 87.9 88.6   MCHC g/dL 28.9* 29.0* 28.4*   PLATELETS 10*3/mm3 335 396 319     Results from last 7 days   Lab Units 03/28/23  0323 03/27/23  0713 03/26/23  1447   SODIUM mmol/L 136 138 138   POTASSIUM mmol/L 3.7 4.1 4.7   MAGNESIUM mg/dL 1.9 2.0 2.1   CHLORIDE mmol/L 99 102 103   CO2 mmol/L 25.3 24.0 23.7   BUN mg/dL 48* 36* 35*   CREATININE mg/dL 1.13 0.88 0.91   CALCIUM mg/dL 9.0 9.0 9.2   GLUCOSE mg/dL 153* 155* 141*   ALBUMIN g/dL  --   --  3.2*   BILIRUBIN mg/dL  --   --  <0.2   ALK PHOS U/L  --   --  114   AST (SGOT) U/L  --   --  43*   ALT (SGPT) U/L  --   --  41   Estimated Creatinine Clearance: 116.8 mL/min (by C-G formula based on SCr of 1.13 mg/dL).  No results found for: AMMONIA    Glucose   Date/Time Value Ref Range Status   03/30/2023 1028 299 (H) 70 - 130 mg/dL Final     Comment:     Meter: [id number] : [id number] ASHLEY HOFFMANN   03/30/2023 0732 241 (H) 70 - 130 mg/dL Final     Comment:     Meter: [id number] : [id number] ASHLEY HOFFMANN   03/29/2023 2317 275 (H) 70 - 130 mg/dL Final     Comment:     Meter: [id number] : [id number] SUDHAKAR PORTER   03/29/2023 1956 273 (H) 70 - 130 mg/dL Final     Comment:     Meter: [id number] : [id number] SUDHAKAR PORTER   03/29/2023 1643 260 (H) 70 - 130 mg/dL Final     Comment:     Meter: [id number] : [id number] BIRGIT MULLER   03/29/2023 1105 240 (H) 70 - 130 mg/dL Final     Comment:     Meter: [id number] : [id number] BIRGIT MULLER   03/29/2023 0702 155 (H) 70 - 130 mg/dL Final     Comment:      Meter: [id number] : [id number] CINDY STRONG   03/28/2023 2024 186 (H) 70 - 130 mg/dL Final     Comment:     Meter: [id number] : [id number] WILBER BERTRAND     Lab Results   Component Value Date    HGBA1C 11.50 (H) 02/13/2023     Lab Results   Component Value Date    TSH 3.780 07/19/2022    FREET4 1.31 06/02/2021       Blood Culture   Date Value Ref Range Status   02/12/2023 No growth at 24 hours  Preliminary   02/12/2023 No growth at 24 hours  Preliminary     No results found for: URINECX  No results found for: WOUNDCX  No results found for: STOOLCX  No results found for: RESPCX  Pain Management Panel     Pain Management Panel Latest Ref Rng & Units 6/2/2021 8/19/2018    AMPHETAMINES SCREEN, URINE Negative Negative Negative    BARBITURATES SCREEN Negative Negative Negative    BENZODIAZEPINE SCREEN, URINE Negative Negative Negative    BUPRENORPHINEUR Negative Negative Negative    COCAINE SCREEN, URINE Negative Negative Negative    METHADONE SCREEN, URINE Negative Negative Negative            ----------------------------------------------------------------------------------------------------------------------  Imaging Results (Last 24 Hours)     ** No results found for the last 24 hours. **          -----------------------------------------------------------------------------------  Pertinent Infectious Disease Results     CT abdomen pelvis with contrast on 2/12/2023 showed there is a new decubitus ulcer just to the right of midline at the level of the coccyx with constellation of imaging findings most characteristic of acute infection/osteomyelitis. Small pocket of air and fluid adjacent to the coccyx measuring 3.7 x 4.1 cm could reflect phlegmon or abscess. Chronic bilateral decubitus ulcers at the level of the ischial tuberosities bilaterally with imaging findings suggestive of chronic infection/osteomyelitis, similar to prior. Hepatic steatosis and hepatomegaly with borderline splenomegaly. Nonobstructing  left renal stones. Postoperative changes of left lower quadrant and colostomy and right mid abdominal ileal conduit formation. Diverticulosis. No diverticulitis or bowel obstruction. COVID-19 and flu A/B PCR on 2/12/2023 was negative. Wound culture from 3/17/2023 finalized as pan susceptible Enterococcus faecalis and pan susceptible Enterococcus avium. Respiratory panel on 3/22/2023 was negative.  Chest x-ray on 3/22/2023 was unremarkable.    Status post excisional debridement of sacral ulcer on 2/13/2023 with Dr. Taylor.    Status post debridement with deep tissue culture with wound care APRN Cathie Handy on 3/17/2023.    Assessment/Plan         ASSESSMENT:    Septic shock with lactic acid greater than 4 on admission  Acute and chronic decubitus ulcerations with osteomyelitis and possible abscess      PLAN:      Patient resting in bed this morning.  No issues or complaints.  Currently on room air this morning with no apparent distress.  Afebrile, no increase in ostomy output.  WBC normal at 9.98.  CRP improved at 8.11.  Chest x-ray from 3/29/2023 reports no evidence of acute cardiac or pulmonary disease.    Recommend to continue on Unasyn 3 g IV every 6 hours until 4/3/2023 for enterococcal sacral wound infection.  We will continue to follow closely.     Code Status:   Code Status and Medical Interventions:   Ordered at: 02/21/23 1034     Code Status (Patient has no pulse and is not breathing):    CPR (Attempt to Resuscitate)     Medical Interventions (Patient has pulse or is breathing):    Full Support       GUANACO Flores  03/30/23  13:05 EDT

## 2023-03-30 NOTE — PLAN OF CARE
Goal Outcome Evaluation:  Plan of Care Reviewed With: patient           Outcome Evaluation: Patient resting in bed at this time. No acute changes this shift. Wound care completed. Will continue with plan of care.

## 2023-03-30 NOTE — PLAN OF CARE
Goal Outcome Evaluation:               PT has rested in bed this shift with his brother at bedside. Wound care completed per order. No c/o or s/sx of distress noted, VSS, will continue POC

## 2023-03-31 LAB
GLUCOSE BLDC GLUCOMTR-MCNC: 202 MG/DL (ref 70–130)
GLUCOSE BLDC GLUCOMTR-MCNC: 249 MG/DL (ref 70–130)
GLUCOSE BLDC GLUCOMTR-MCNC: 250 MG/DL (ref 70–130)
GLUCOSE BLDC GLUCOMTR-MCNC: 267 MG/DL (ref 70–130)
GLUCOSE BLDC GLUCOMTR-MCNC: 280 MG/DL (ref 70–130)

## 2023-03-31 PROCEDURE — 99308 SBSQ NF CARE LOW MDM 20: CPT | Performed by: NURSE PRACTITIONER

## 2023-03-31 PROCEDURE — 25010000002 AMPICILLIN-SULBACTAM PER 1.5 G: Performed by: INTERNAL MEDICINE

## 2023-03-31 PROCEDURE — 63710000001 INSULIN LISPRO (HUMAN) PER 5 UNITS: Performed by: HOSPITALIST

## 2023-03-31 PROCEDURE — 82962 GLUCOSE BLOOD TEST: CPT

## 2023-03-31 PROCEDURE — 63710000001 INSULIN GLARGINE PER 5 UNITS: Performed by: HOSPITALIST

## 2023-03-31 RX ORDER — METOLAZONE 2.5 MG/1
2.5 TABLET ORAL DAILY
Status: COMPLETED | OUTPATIENT
Start: 2023-03-31 | End: 2023-03-31

## 2023-03-31 RX ORDER — BUMETANIDE 1 MG/1
2 TABLET ORAL DAILY
Status: DISCONTINUED | OUTPATIENT
Start: 2023-04-01 | End: 2023-04-04 | Stop reason: HOSPADM

## 2023-03-31 RX ADMIN — BACLOFEN 30 MG: 10 TABLET ORAL at 17:44

## 2023-03-31 RX ADMIN — DICYCLOMINE HYDROCHLORIDE 10 MG: 10 CAPSULE ORAL at 08:12

## 2023-03-31 RX ADMIN — BACLOFEN 30 MG: 10 TABLET ORAL at 21:40

## 2023-03-31 RX ADMIN — VITAMINS A AND D OINTMENT 1 APPLICATION: 15.5; 53.4 OINTMENT TOPICAL at 08:13

## 2023-03-31 RX ADMIN — DULOXETINE HYDROCHLORIDE 60 MG: 60 CAPSULE, DELAYED RELEASE ORAL at 08:12

## 2023-03-31 RX ADMIN — BACLOFEN 30 MG: 10 TABLET ORAL at 08:06

## 2023-03-31 RX ADMIN — AMPICILLIN SODIUM AND SULBACTAM SODIUM 3 G: 2; 1 INJECTION, POWDER, FOR SOLUTION INTRAMUSCULAR; INTRAVENOUS at 10:26

## 2023-03-31 RX ADMIN — Medication 10 ML: at 21:41

## 2023-03-31 RX ADMIN — APIXABAN 5 MG: 5 TABLET, FILM COATED ORAL at 08:09

## 2023-03-31 RX ADMIN — NYSTATIN: 100000 POWDER TOPICAL at 08:15

## 2023-03-31 RX ADMIN — ATORVASTATIN CALCIUM 10 MG: 10 TABLET, FILM COATED ORAL at 21:41

## 2023-03-31 RX ADMIN — Medication 1 CAPSULE: at 08:09

## 2023-03-31 RX ADMIN — SPIRONOLACTONE 25 MG: 25 TABLET ORAL at 08:15

## 2023-03-31 RX ADMIN — DICYCLOMINE HYDROCHLORIDE 10 MG: 10 CAPSULE ORAL at 21:40

## 2023-03-31 RX ADMIN — BACLOFEN 30 MG: 10 TABLET ORAL at 11:21

## 2023-03-31 RX ADMIN — DULOXETINE HYDROCHLORIDE 60 MG: 60 CAPSULE, DELAYED RELEASE ORAL at 21:41

## 2023-03-31 RX ADMIN — VITAMINS A AND D OINTMENT 1 APPLICATION: 15.5; 53.4 OINTMENT TOPICAL at 21:41

## 2023-03-31 RX ADMIN — APIXABAN 5 MG: 5 TABLET, FILM COATED ORAL at 21:41

## 2023-03-31 RX ADMIN — MAGNESIUM GLUCONATE 500 MG ORAL TABLET 1200 MG: 500 TABLET ORAL at 08:14

## 2023-03-31 RX ADMIN — PANTOPRAZOLE SODIUM 40 MG: 40 TABLET, DELAYED RELEASE ORAL at 21:40

## 2023-03-31 RX ADMIN — PANTOPRAZOLE SODIUM 40 MG: 40 TABLET, DELAYED RELEASE ORAL at 08:11

## 2023-03-31 RX ADMIN — GABAPENTIN 800 MG: 400 CAPSULE ORAL at 08:13

## 2023-03-31 RX ADMIN — OXYCODONE HYDROCHLORIDE AND ACETAMINOPHEN 500 MG: 500 TABLET ORAL at 08:10

## 2023-03-31 RX ADMIN — ARIPIPRAZOLE 10 MG: 10 TABLET ORAL at 21:40

## 2023-03-31 RX ADMIN — AMPICILLIN SODIUM AND SULBACTAM SODIUM 3 G: 2; 1 INJECTION, POWDER, FOR SOLUTION INTRAMUSCULAR; INTRAVENOUS at 17:44

## 2023-03-31 RX ADMIN — AMPICILLIN SODIUM AND SULBACTAM SODIUM 3 G: 2; 1 INJECTION, POWDER, FOR SOLUTION INTRAMUSCULAR; INTRAVENOUS at 23:04

## 2023-03-31 RX ADMIN — METFORMIN HYDROCHLORIDE 1000 MG: 500 TABLET ORAL at 08:08

## 2023-03-31 RX ADMIN — Medication 10 ML: at 08:15

## 2023-03-31 RX ADMIN — GABAPENTIN 800 MG: 400 CAPSULE ORAL at 15:22

## 2023-03-31 RX ADMIN — INSULIN GLARGINE 60 UNITS: 100 INJECTION, SOLUTION SUBCUTANEOUS at 08:13

## 2023-03-31 RX ADMIN — INSULIN GLARGINE 60 UNITS: 100 INJECTION, SOLUTION SUBCUTANEOUS at 21:40

## 2023-03-31 RX ADMIN — MODAFINIL 200 MG: 100 TABLET ORAL at 08:14

## 2023-03-31 RX ADMIN — METFORMIN HYDROCHLORIDE 1000 MG: 500 TABLET ORAL at 17:44

## 2023-03-31 RX ADMIN — AMPICILLIN SODIUM AND SULBACTAM SODIUM 3 G: 2; 1 INJECTION, POWDER, FOR SOLUTION INTRAMUSCULAR; INTRAVENOUS at 04:53

## 2023-03-31 RX ADMIN — EMPAGLIFLOZIN 10 MG: 10 TABLET, FILM COATED ORAL at 08:12

## 2023-03-31 RX ADMIN — BUMETANIDE 2 MG: 0.25 INJECTION, SOLUTION INTRAMUSCULAR; INTRAVENOUS at 10:25

## 2023-03-31 RX ADMIN — FERROUS SULFATE TAB 325 MG (65 MG ELEMENTAL FE) 325 MG: 325 (65 FE) TAB at 08:07

## 2023-03-31 RX ADMIN — GABAPENTIN 800 MG: 400 CAPSULE ORAL at 21:40

## 2023-03-31 RX ADMIN — METOLAZONE 2.5 MG: 2.5 TABLET ORAL at 09:32

## 2023-03-31 RX ADMIN — INSULIN LISPRO 4 UNITS: 100 INJECTION, SOLUTION INTRAVENOUS; SUBCUTANEOUS at 08:07

## 2023-03-31 RX ADMIN — DAKIN'S SOLUTION 0.125% (QUARTER STRENGTH): 0.12 SOLUTION at 14:25

## 2023-03-31 RX ADMIN — SACUBITRIL AND VALSARTAN 1 TABLET: 24; 26 TABLET, FILM COATED ORAL at 21:41

## 2023-03-31 RX ADMIN — NYSTATIN: 100000 POWDER TOPICAL at 21:41

## 2023-03-31 RX ADMIN — SACUBITRIL AND VALSARTAN 1 TABLET: 24; 26 TABLET, FILM COATED ORAL at 08:15

## 2023-03-31 RX ADMIN — INSULIN LISPRO 4 UNITS: 100 INJECTION, SOLUTION INTRAVENOUS; SUBCUTANEOUS at 11:21

## 2023-03-31 RX ADMIN — INSULIN LISPRO 6 UNITS: 100 INJECTION, SOLUTION INTRAVENOUS; SUBCUTANEOUS at 17:44

## 2023-03-31 RX ADMIN — SUCRALFATE 1 G: 1 TABLET ORAL at 08:15

## 2023-03-31 RX ADMIN — Medication 1 TABLET: at 08:14

## 2023-03-31 RX ADMIN — Medication 1000 UNITS: at 08:10

## 2023-03-31 RX ADMIN — CARVEDILOL 12.5 MG: 6.25 TABLET, FILM COATED ORAL at 08:06

## 2023-03-31 NOTE — PLAN OF CARE
Goal Outcome Evaluation:  Plan of Care Reviewed With: patient           Outcome Evaluation: Patient resting in bed at this time. No acute chnages this shift. Wound care completed. Will continue with plan of care.

## 2023-03-31 NOTE — CASE MANAGEMENT/SOCIAL WORK
Discharge Planning Assessment   Fabricio     Patient Name: Ken Krueger  MRN: [id number]  Today's Date: 3/31/2023    Admit Date: 2/21/2023       Discharge Plan     Row Name 03/31/23 0937       Plan    Plan Pt was admitted to swing Bed on 2/21/23 for IV antibiotics. Pt has been approved for additional swing bed days through 4/4/23 with expected dishcarge date 4/5/23. Pt lives with his mother and brother and he plans to return home at discharge. Pt utilized [facility] prior to admission. [facility] [phone] to be contacted at discharge. Pt has a hospital bed, trapeze bar, patricio lift, and wheelchair via [facility] and an electric wheelchair. SS to follow. SS to follow.              Continued Care and Services - Admitted Since 2/21/2023     Home Medical Care     Service Provider Request Status Selected Services Address Phone Fax Patient Preferred    [email or web address] Pending - No Request Sent N/A  [employer] [place] [facility] [address] [phone] -- --              BRENT Burns

## 2023-03-31 NOTE — PROGRESS NOTES
PROGRESS NOTE         Patient Identification:  Name:  Ken Krueger  Age:  47 y.o.  Sex:  male  :  1975  MRN:  [id number]  Visit Number:  [id number]  Primary Care Provider:  Franchesca Hodges APRN         LOS: 38 days       ----------------------------------------------------------------------------------------------------------------------  Subjective       Chief Complaints:    No chief complaint on file.      Interval History:      Patient sitting up in bed this morning eating breakfast.  No issues or complaints.  Afebrile, denies increase in ostomy output.  Currently on room air with no apparent distress.  Lungs clear to auscultation bilaterally.  Abdomen soft, nontender.    Review of Systems:    Constitutional: No fever, chills. No night sweats. No appetite change or unexpected weight change. No fatigue.  Eyes: no eye drainage, itching or redness.  HEENT: no mouth sores, dysphagia or nose bleed.   Respiratory: no for shortness of breath.    Cardiovascular: no chest pain, no palpitations, no orthopnea.  Gastrointestinal: no nausea, vomiting or diarrhea. No abdominal pain, hematemesis or rectal bleeding.  Genitourinary: no dysuria or polyuria.  Hematologic/lymphatic: no lymph node abnormalities, no easy bruising or easy bleeding.  Musculoskeletal: no muscle or joint pain.  Skin: No rash and no itching.  Neurological: no loss of consciousness, no seizure, no headache.  Behavioral/Psych: no depression or suicidal ideation.  Endocrine: no hot flashes.  Immunologic: negative.    ----------------------------------------------------------------------------------------------------------------------      Objective       [facility] Meds:    Pharmacy Consult - Pharmacy to dose,       ----------------------------------------------------------------------------------------------------------------------    Vital Signs:  Temp:  [97.8 °F (36.6 °C)-98.9 °F (37.2 °C)] 98.9 °F (37.2 °C)  Heart Rate:  [78-86]  82  Resp:  [18] 18  BP: (100-126)/(59-78) 110/60  No data found.  SpO2 Percentage    03/28/23 1849 03/29/23 1900 03/30/23 1900   SpO2: 96% 96% 97%     SpO2:  [97 %] 97 %  on   ;   Device (Oxygen Therapy): room air    Body mass index is 42.34 kg/m².  Wt Readings from Last 3 Encounters:   02/21/23 (!) 138 kg (303 lb 9.2 oz)   02/20/23 (!) 139 kg (306 lb 14.1 oz)   12/13/22 (!) 141 kg (310 lb 14.4 oz)        Intake/Output Summary (Last 24 hours) at 3/31/2023 1045  Last data filed at 3/31/2023 0932  Gross per 24 hour   Intake --   Output 7450 ml   Net -7450 ml     Diet: Diabetic Diets; Consistent Carbohydrate; Texture: Regular Texture (IDDSI 7); Fluid Consistency: Thin (IDDSI 0)  ----------------------------------------------------------------------------------------------------------------------      Physical Exam:    Constitutional: Morbidly obese  male is sitting up in bed on room air in no apparent distress.  HENT:  Head: Normocephalic and atraumatic.  Mouth:  Moist mucous membranes.    Eyes:  Conjunctivae and EOM are normal.  No scleral icterus.  Neck:  Neck supple.  No JVD present.    Cardiovascular:  Normal rate, regular rhythm and normal heart sounds with no murmur. No edema.  Pulmonary/Chest:  No respiratory distress, with no wheezing, rhonchi, or rales.  Clear to auscultation bilaterally.  Abdominal:  Morbidly obese.  Soft.  Bowel sounds are normal.  No distension and no tenderness.  Ileal conduit in place.  Colostomy bag in place with soft stools.  Musculoskeletal:  No tenderness.  No swelling or redness of joints.  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 PICC to right upper arm with no signs of infection.  Neurological:  Alert and oriented to person, place, and time.  No facial droop.  No slurred speech.   Skin:  Stage IV sacral decubitus wound.  Dressing in place.   Psychiatric:  Normal mood and affect.  Behavior is  normal.      ----------------------------------------------------------------------------------------------------------------------      Results from last 7 days   Lab Units 03/26/23  1447   PROBNP pg/mL 1,009.0*         Results from last 7 days   Lab Units 03/30/23  0159 03/29/23  0112 03/28/23  0323   CRP mg/dL 8.11* 9.04*  --    WBC 10*3/mm3 9.98 13.24* 12.19*   HEMOGLOBIN g/dL 10.2* 10.9* 10.2*   HEMATOCRIT % 35.3* 37.6 35.9*   MCV fL 88.5 87.9 88.6   MCHC g/dL 28.9* 29.0* 28.4*   PLATELETS 10*3/mm3 335 396 319     Results from last 7 days   Lab Units 03/28/23  0323 03/27/23  0713 03/26/23  1447   SODIUM mmol/L 136 138 138   POTASSIUM mmol/L 3.7 4.1 4.7   MAGNESIUM mg/dL 1.9 2.0 2.1   CHLORIDE mmol/L 99 102 103   CO2 mmol/L 25.3 24.0 23.7   BUN mg/dL 48* 36* 35*   CREATININE mg/dL 1.13 0.88 0.91   CALCIUM mg/dL 9.0 9.0 9.2   GLUCOSE mg/dL 153* 155* 141*   ALBUMIN g/dL  --   --  3.2*   BILIRUBIN mg/dL  --   --  <0.2   ALK PHOS U/L  --   --  114   AST (SGOT) U/L  --   --  43*   ALT (SGPT) U/L  --   --  41   Estimated Creatinine Clearance: 116.8 mL/min (by C-G formula based on SCr of 1.13 mg/dL).  No results found for: AMMONIA    Glucose   Date/Time Value Ref Range Status   03/31/2023 0649 202 (H) 70 - 130 mg/dL Final     Comment:     Meter: [id number] : [id number] Nessa Barbosa   03/30/2023 2352 267 (H) 70 - 130 mg/dL Final     Comment:     Meter: [id number] : [id number] SUDHAKAR PORTER   03/30/2023 1932 271 (H) 70 - 130 mg/dL Final     Comment:     Meter: [id number] : [id number] SUDHAKAR PORTER   03/30/2023 1638 270 (H) 70 - 130 mg/dL Final     Comment:     Meter: [id number] : [id number] ASHLEY HOFFMANN   03/30/2023 1028 299 (H) 70 - 130 mg/dL Final     Comment:     Meter: [id number] : [id number] ASHLEY HOFFMANN   03/30/2023 0732 241 (H) 70 - 130 mg/dL Final     Comment:     Meter: [id number] : [id number] ASHLEYDION HOFFMANN   03/29/2023 2317 275 (H) 70 - 130 mg/dL Final     Comment:      Meter: [id number] : [id number] SUDHAKAR PORTER   03/29/2023 1956 273 (H) 70 - 130 mg/dL Final     Comment:     Meter: [id number] : [id number] SUDHAKAR PORTER     Lab Results   Component Value Date    HGBA1C 11.50 (H) 02/13/2023     Lab Results   Component Value Date    TSH 3.780 07/19/2022    FREET4 1.31 06/02/2021       Blood Culture   Date Value Ref Range Status   02/12/2023 No growth at 24 hours  Preliminary   02/12/2023 No growth at 24 hours  Preliminary     No results found for: URINECX  No results found for: WOUNDCX  No results found for: STOOLCX  No results found for: RESPCX  Pain Management Panel     Pain Management Panel Latest Ref Rng & Units 6/2/2021 8/19/2018    AMPHETAMINES SCREEN, URINE Negative Negative Negative    BARBITURATES SCREEN Negative Negative Negative    BENZODIAZEPINE SCREEN, URINE Negative Negative Negative    BUPRENORPHINEUR Negative Negative Negative    COCAINE SCREEN, URINE Negative Negative Negative    METHADONE SCREEN, URINE Negative Negative Negative            ----------------------------------------------------------------------------------------------------------------------  Imaging Results (Last 24 Hours)     ** No results found for the last 24 hours. **          -----------------------------------------------------------------------------------  Pertinent Infectious Disease Results     CT abdomen pelvis with contrast on 2/12/2023 showed there is a new decubitus ulcer just to the right of midline at the level of the coccyx with constellation of imaging findings most characteristic of acute infection/osteomyelitis. Small pocket of air and fluid adjacent to the coccyx measuring 3.7 x 4.1 cm could reflect phlegmon or abscess. Chronic bilateral decubitus ulcers at the level of the ischial tuberosities bilaterally with imaging findings suggestive of chronic infection/osteomyelitis, similar to prior. Hepatic steatosis and hepatomegaly with borderline splenomegaly. Nonobstructing  left renal stones. Postoperative changes of left lower quadrant and colostomy and right mid abdominal ileal conduit formation. Diverticulosis. No diverticulitis or bowel obstruction. COVID-19 and flu A/B PCR on 2/12/2023 was negative. Wound culture from 3/17/2023 finalized as pan susceptible Enterococcus faecalis and pan susceptible Enterococcus avium. Respiratory panel on 3/22/2023 was negative.  Chest x-ray on 3/22/2023 was unremarkable.    Status post excisional debridement of sacral ulcer on 2/13/2023 with Dr. Taylor.    Status post debridement with deep tissue culture with wound care APRN Cathie Handy on 3/17/2023.    Assessment/Plan         ASSESSMENT:    Septic shock with lactic acid greater than 4 on admission  Acute and chronic decubitus ulcerations with osteomyelitis and possible abscess      PLAN:    Patient sitting up in bed this morning eating breakfast.  No issues or complaints.  Afebrile, denies increase in ostomy output.  Currently on room air with no apparent distress.  Lungs clear to auscultation bilaterally.  Abdomen soft, nontender.     Recommend to continue on Unasyn 3 g IV every 6 hours until 4/3/2023 for enterococcal sacral wound infection.  We will continue to follow closely.     Code Status:   Code Status and Medical Interventions:   Ordered at: 02/21/23 1034     Code Status (Patient has no pulse and is not breathing):    CPR (Attempt to Resuscitate)     Medical Interventions (Patient has pulse or is breathing):    Full Support       UGANACO Flores  03/31/23  10:45 EDT

## 2023-03-31 NOTE — PLAN OF CARE
Goal Outcome Evaluation:               PT has rested in bed this shift, wound care completed per order. PT has had no c/o or acute changes noted at this time. Home CPAP on at night. Will continue POC

## 2023-04-01 LAB
GLUCOSE BLDC GLUCOMTR-MCNC: 200 MG/DL (ref 70–130)
GLUCOSE BLDC GLUCOMTR-MCNC: 213 MG/DL (ref 70–130)
GLUCOSE BLDC GLUCOMTR-MCNC: 224 MG/DL (ref 70–130)
GLUCOSE BLDC GLUCOMTR-MCNC: 289 MG/DL (ref 70–130)
GLUCOSE BLDC GLUCOMTR-MCNC: 304 MG/DL (ref 70–130)

## 2023-04-01 PROCEDURE — 63710000001 INSULIN LISPRO (HUMAN) PER 5 UNITS: Performed by: HOSPITALIST

## 2023-04-01 PROCEDURE — 99308 SBSQ NF CARE LOW MDM 20: CPT | Performed by: NURSE PRACTITIONER

## 2023-04-01 PROCEDURE — 82962 GLUCOSE BLOOD TEST: CPT

## 2023-04-01 PROCEDURE — 25010000002 AMPICILLIN-SULBACTAM PER 1.5 G: Performed by: INTERNAL MEDICINE

## 2023-04-01 PROCEDURE — 63710000001 INSULIN GLARGINE PER 5 UNITS: Performed by: HOSPITALIST

## 2023-04-01 RX ORDER — SUCRALFATE ORAL 1 G/10ML
1 SUSPENSION ORAL DAILY
Status: DISCONTINUED | OUTPATIENT
Start: 2023-04-01 | End: 2023-04-01

## 2023-04-01 RX ORDER — GLUCAGON 1 MG/ML
1 KIT INJECTION
Status: DISCONTINUED | OUTPATIENT
Start: 2023-04-01 | End: 2023-04-04 | Stop reason: HOSPADM

## 2023-04-01 RX ORDER — INSULIN LISPRO 100 [IU]/ML
30 INJECTION, SOLUTION INTRAVENOUS; SUBCUTANEOUS
Status: DISCONTINUED | OUTPATIENT
Start: 2023-04-01 | End: 2023-04-04 | Stop reason: HOSPADM

## 2023-04-01 RX ORDER — DEXTROSE MONOHYDRATE 25 G/50ML
25 INJECTION, SOLUTION INTRAVENOUS
Status: DISCONTINUED | OUTPATIENT
Start: 2023-04-01 | End: 2023-04-04 | Stop reason: HOSPADM

## 2023-04-01 RX ORDER — NICOTINE POLACRILEX 4 MG
15 LOZENGE BUCCAL
Status: DISCONTINUED | OUTPATIENT
Start: 2023-04-01 | End: 2023-04-04 | Stop reason: HOSPADM

## 2023-04-01 RX ORDER — SUCRALFATE 1 G/1
1 TABLET ORAL DAILY
Status: DISCONTINUED | OUTPATIENT
Start: 2023-04-02 | End: 2023-04-04 | Stop reason: HOSPADM

## 2023-04-01 RX ADMIN — SPIRONOLACTONE 25 MG: 25 TABLET ORAL at 08:18

## 2023-04-01 RX ADMIN — CARVEDILOL 12.5 MG: 6.25 TABLET, FILM COATED ORAL at 08:18

## 2023-04-01 RX ADMIN — INSULIN LISPRO 30 UNITS: 100 INJECTION, SOLUTION INTRAVENOUS; SUBCUTANEOUS at 08:22

## 2023-04-01 RX ADMIN — PANTOPRAZOLE SODIUM 40 MG: 40 TABLET, DELAYED RELEASE ORAL at 08:18

## 2023-04-01 RX ADMIN — DAKIN'S SOLUTION 0.125% (QUARTER STRENGTH): 0.12 SOLUTION at 14:02

## 2023-04-01 RX ADMIN — DULOXETINE HYDROCHLORIDE 60 MG: 60 CAPSULE, DELAYED RELEASE ORAL at 20:11

## 2023-04-01 RX ADMIN — INSULIN LISPRO 4 UNITS: 100 INJECTION, SOLUTION INTRAVENOUS; SUBCUTANEOUS at 17:03

## 2023-04-01 RX ADMIN — Medication 1 CAPSULE: at 08:18

## 2023-04-01 RX ADMIN — NYSTATIN: 100000 POWDER TOPICAL at 08:19

## 2023-04-01 RX ADMIN — Medication 1 TABLET: at 08:18

## 2023-04-01 RX ADMIN — INSULIN GLARGINE 60 UNITS: 100 INJECTION, SOLUTION SUBCUTANEOUS at 08:17

## 2023-04-01 RX ADMIN — METFORMIN HYDROCHLORIDE 1000 MG: 500 TABLET ORAL at 17:03

## 2023-04-01 RX ADMIN — ARIPIPRAZOLE 10 MG: 10 TABLET ORAL at 20:11

## 2023-04-01 RX ADMIN — GABAPENTIN 800 MG: 400 CAPSULE ORAL at 20:11

## 2023-04-01 RX ADMIN — DAKIN'S SOLUTION 0.125% (QUARTER STRENGTH): 0.12 SOLUTION at 02:22

## 2023-04-01 RX ADMIN — DICYCLOMINE HYDROCHLORIDE 10 MG: 10 CAPSULE ORAL at 20:11

## 2023-04-01 RX ADMIN — PANTOPRAZOLE SODIUM 40 MG: 40 TABLET, DELAYED RELEASE ORAL at 20:11

## 2023-04-01 RX ADMIN — GABAPENTIN 800 MG: 400 CAPSULE ORAL at 08:18

## 2023-04-01 RX ADMIN — DULOXETINE HYDROCHLORIDE 60 MG: 60 CAPSULE, DELAYED RELEASE ORAL at 08:18

## 2023-04-01 RX ADMIN — SUCRALFATE 1 G: 1 SUSPENSION ORAL at 12:50

## 2023-04-01 RX ADMIN — BACLOFEN 30 MG: 10 TABLET ORAL at 08:22

## 2023-04-01 RX ADMIN — EMPAGLIFLOZIN 10 MG: 10 TABLET, FILM COATED ORAL at 08:18

## 2023-04-01 RX ADMIN — AMPICILLIN SODIUM AND SULBACTAM SODIUM 3 G: 2; 1 INJECTION, POWDER, FOR SOLUTION INTRAMUSCULAR; INTRAVENOUS at 10:28

## 2023-04-01 RX ADMIN — BACLOFEN 30 MG: 10 TABLET ORAL at 20:11

## 2023-04-01 RX ADMIN — Medication 10 ML: at 20:12

## 2023-04-01 RX ADMIN — BACLOFEN 30 MG: 10 TABLET ORAL at 11:42

## 2023-04-01 RX ADMIN — FERROUS SULFATE TAB 325 MG (65 MG ELEMENTAL FE) 325 MG: 325 (65 FE) TAB at 08:22

## 2023-04-01 RX ADMIN — METFORMIN HYDROCHLORIDE 1000 MG: 500 TABLET ORAL at 08:18

## 2023-04-01 RX ADMIN — CARVEDILOL 12.5 MG: 6.25 TABLET, FILM COATED ORAL at 17:03

## 2023-04-01 RX ADMIN — VITAMINS A AND D OINTMENT 1 APPLICATION: 15.5; 53.4 OINTMENT TOPICAL at 20:12

## 2023-04-01 RX ADMIN — APIXABAN 5 MG: 5 TABLET, FILM COATED ORAL at 20:11

## 2023-04-01 RX ADMIN — VITAMINS A AND D OINTMENT 1 APPLICATION: 15.5; 53.4 OINTMENT TOPICAL at 08:19

## 2023-04-01 RX ADMIN — MODAFINIL 200 MG: 100 TABLET ORAL at 08:18

## 2023-04-01 RX ADMIN — APIXABAN 5 MG: 5 TABLET, FILM COATED ORAL at 08:18

## 2023-04-01 RX ADMIN — BACLOFEN 30 MG: 10 TABLET ORAL at 17:03

## 2023-04-01 RX ADMIN — INSULIN LISPRO 4 UNITS: 100 INJECTION, SOLUTION INTRAVENOUS; SUBCUTANEOUS at 11:42

## 2023-04-01 RX ADMIN — INSULIN LISPRO 30 UNITS: 100 INJECTION, SOLUTION INTRAVENOUS; SUBCUTANEOUS at 11:41

## 2023-04-01 RX ADMIN — OXYCODONE HYDROCHLORIDE AND ACETAMINOPHEN 500 MG: 500 TABLET ORAL at 08:18

## 2023-04-01 RX ADMIN — AMPICILLIN SODIUM AND SULBACTAM SODIUM 3 G: 2; 1 INJECTION, POWDER, FOR SOLUTION INTRAMUSCULAR; INTRAVENOUS at 05:08

## 2023-04-01 RX ADMIN — NYSTATIN: 100000 POWDER TOPICAL at 20:12

## 2023-04-01 RX ADMIN — INSULIN GLARGINE 60 UNITS: 100 INJECTION, SOLUTION SUBCUTANEOUS at 20:11

## 2023-04-01 RX ADMIN — DICYCLOMINE HYDROCHLORIDE 10 MG: 10 CAPSULE ORAL at 08:18

## 2023-04-01 RX ADMIN — SACUBITRIL AND VALSARTAN 1 TABLET: 24; 26 TABLET, FILM COATED ORAL at 08:18

## 2023-04-01 RX ADMIN — INSULIN LISPRO 4 UNITS: 100 INJECTION, SOLUTION INTRAVENOUS; SUBCUTANEOUS at 08:17

## 2023-04-01 RX ADMIN — AMPICILLIN SODIUM AND SULBACTAM SODIUM 3 G: 2; 1 INJECTION, POWDER, FOR SOLUTION INTRAMUSCULAR; INTRAVENOUS at 16:38

## 2023-04-01 RX ADMIN — GABAPENTIN 800 MG: 400 CAPSULE ORAL at 15:19

## 2023-04-01 RX ADMIN — BUMETANIDE 2 MG: 1 TABLET ORAL at 08:18

## 2023-04-01 RX ADMIN — AMPICILLIN SODIUM AND SULBACTAM SODIUM 3 G: 2; 1 INJECTION, POWDER, FOR SOLUTION INTRAMUSCULAR; INTRAVENOUS at 22:52

## 2023-04-01 RX ADMIN — MAGNESIUM GLUCONATE 500 MG ORAL TABLET 1200 MG: 500 TABLET ORAL at 08:18

## 2023-04-01 RX ADMIN — ATORVASTATIN CALCIUM 10 MG: 10 TABLET, FILM COATED ORAL at 20:11

## 2023-04-01 RX ADMIN — Medication 10 ML: at 08:18

## 2023-04-01 RX ADMIN — Medication 1000 UNITS: at 08:18

## 2023-04-01 RX ADMIN — INSULIN LISPRO 30 UNITS: 100 INJECTION, SOLUTION INTRAVENOUS; SUBCUTANEOUS at 17:02

## 2023-04-01 NOTE — PLAN OF CARE
Goal Outcome Evaluation:           Progress: no change  Outcome Evaluation: Pt. resting in bed at this time. Wound care completed per orders. VSS, no acute changes this shift. Will continue with plan of care.

## 2023-04-01 NOTE — PLAN OF CARE
Goal Outcome Evaluation:              Outcome Evaluation: Pt is resting in bed at this time. Wound care completed per orders. No acute changes noted at this time. Pt voices no complaints. Will continue plan of care.

## 2023-04-01 NOTE — PROGRESS NOTES
PROGRESS NOTE         Patient Identification:  Name:  Ken Krueger  Age:  47 y.o.  Sex:  male  :  1975  MRN:  [id number]  Visit Number:  [id number]  Primary Care Provider:  Franchesca Hodges APRN         LOS: 39 days       ----------------------------------------------------------------------------------------------------------------------  Subjective       Chief Complaints:    No chief complaint on file.      Interval History:      Patient resting in bed this morning.  Drowsy this morning.  Currently on room air with no apparent distress.  Afebrile, no reported increase in ostomy output.  Lungs clear to auscultation bilaterally.    Review of Systems:    Constitutional: No fever, chills. No night sweats. No appetite change or unexpected weight change. No fatigue.  Eyes: no eye drainage, itching or redness.  HEENT: no mouth sores, dysphagia or nose bleed.   Respiratory: no for shortness of breath.    Cardiovascular: no chest pain, no palpitations, no orthopnea.  Gastrointestinal: no nausea, vomiting or diarrhea. No abdominal pain, hematemesis or rectal bleeding.  Genitourinary: no dysuria or polyuria.  Hematologic/lymphatic: no lymph node abnormalities, no easy bruising or easy bleeding.  Musculoskeletal: no muscle or joint pain.  Skin: No rash and no itching.  Neurological: no loss of consciousness, no seizure, no headache.  Behavioral/Psych: no depression or suicidal ideation.  Endocrine: no hot flashes.  Immunologic: negative.    ----------------------------------------------------------------------------------------------------------------------      Objective       [facility] Meds:    Pharmacy Consult - Pharmacy to dose,       ----------------------------------------------------------------------------------------------------------------------    Vital Signs:  Temp:  [97.4 °F (36.3 °C)-97.6 °F (36.4 °C)] 97.6 °F (36.4 °C)  Heart Rate:  [82-88] 83  Resp:  [18] 18  BP: (100-118)/(58-76)  112/63  No data found.  SpO2 Percentage    03/29/23 1900 03/30/23 1900 04/01/23 0600   SpO2: 96% 97% 95%     SpO2:  [95 %] 95 %  on   ;   Device (Oxygen Therapy): room air    Body mass index is 42.34 kg/m².  Wt Readings from Last 3 Encounters:   02/21/23 (!) 138 kg (303 lb 9.2 oz)   02/20/23 (!) 139 kg (306 lb 14.1 oz)   12/13/22 (!) 141 kg (310 lb 14.4 oz)        Intake/Output Summary (Last 24 hours) at 4/1/2023 1044  Last data filed at 4/1/2023 0984  Gross per 24 hour   Intake 1000 ml   Output 5000 ml   Net -4000 ml     Diet: Diabetic Diets; Consistent Carbohydrate; Texture: Regular Texture (IDDSI 7); Fluid Consistency: Thin (IDDSI 0)  ----------------------------------------------------------------------------------------------------------------------      Physical Exam:    Constitutional: Morbidly obese  male is resting in bed on room air in no apparent distress.  HENT:  Head: Normocephalic and atraumatic.  Mouth:  Moist mucous membranes.    Eyes:  Conjunctivae and EOM are normal.  No scleral icterus.  Neck:  Neck supple.  No JVD present.    Cardiovascular:  Normal rate, regular rhythm and normal heart sounds with no murmur. No edema.  Pulmonary/Chest:  No respiratory distress, with no wheezing, rhonchi, or rales.  Clear to auscultation bilaterally.  Abdominal:  Morbidly obese.  Soft.  Bowel sounds are normal.  No distension and no tenderness.  Ileal conduit in place.  Colostomy bag in place with soft stools.  Musculoskeletal:  No tenderness.  No swelling or redness of joints.  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 PICC to right upper arm with no signs of infection.  Neurological:  Alert and oriented to person, place, and time.  No facial droop.  No slurred speech.   Skin:  Stage IV sacral decubitus wound.  Dressing in place.   Psychiatric:  Normal mood and affect.  Behavior is normal.      ----------------------------------------------------------------------------------------------------------------------       Results from last 7 days   Lab Units 03/26/23  1447   PROBNP pg/mL 1,009.0*         Results from last 7 days   Lab Units 03/30/23  0159 03/29/23  0112 03/28/23  0323   CRP mg/dL 8.11* 9.04*  --    WBC 10*3/mm3 9.98 13.24* 12.19*   HEMOGLOBIN g/dL 10.2* 10.9* 10.2*   HEMATOCRIT % 35.3* 37.6 35.9*   MCV fL 88.5 87.9 88.6   MCHC g/dL 28.9* 29.0* 28.4*   PLATELETS 10*3/mm3 335 396 319     Results from last 7 days   Lab Units 03/28/23  0323 03/27/23  0713 03/26/23  1447   SODIUM mmol/L 136 138 138   POTASSIUM mmol/L 3.7 4.1 4.7   MAGNESIUM mg/dL 1.9 2.0 2.1   CHLORIDE mmol/L 99 102 103   CO2 mmol/L 25.3 24.0 23.7   BUN mg/dL 48* 36* 35*   CREATININE mg/dL 1.13 0.88 0.91   CALCIUM mg/dL 9.0 9.0 9.2   GLUCOSE mg/dL 153* 155* 141*   ALBUMIN g/dL  --   --  3.2*   BILIRUBIN mg/dL  --   --  <0.2   ALK PHOS U/L  --   --  114   AST (SGOT) U/L  --   --  43*   ALT (SGPT) U/L  --   --  41   Estimated Creatinine Clearance: 116.8 mL/min (by C-G formula based on SCr of 1.13 mg/dL).  No results found for: AMMONIA    Glucose   Date/Time Value Ref Range Status   04/01/2023 1027 213 (H) 70 - 130 mg/dL Final     Comment:     Meter: [id number] : [id number] BIRGIT MULLER   04/01/2023 0616 200 (H) 70 - 130 mg/dL Final     Comment:     Meter: [id number] : [id number] SANDRA TRAMMELL   04/01/2023 0019 304 (H) 70 - 130 mg/dL Final     Comment:     Meter: [id number] : [id number] DOMINICK DEBORA   03/31/2023 2145 280 (H) 70 - 130 mg/dL Final     Comment:     Meter: [id number] : [id number] DOMINICK CAZARES   03/31/2023 1657 250 (H) 70 - 130 mg/dL Final     Comment:     Meter: [id number] : [id number] ASHLEY HOFFMANN   03/31/2023 1102 249 (H) 70 - 130 mg/dL Final     Comment:     Meter: [id number] : [id number] ASHLEY HOFFMANN   03/31/2023 0649 202 (H) 70 - 130 mg/dL Final     Comment:     Meter: [id number] : [id number] Nessa Barbosa   03/30/2023 2352 267 (H) 70 - 130 mg/dL Final     Comment:     Meter: [id number]  : [id number] SUDHAKAR PORTER     Lab Results   Component Value Date    HGBA1C 11.50 (H) 02/13/2023     Lab Results   Component Value Date    TSH 3.780 07/19/2022    FREET4 1.31 06/02/2021       Blood Culture   Date Value Ref Range Status   02/12/2023 No growth at 24 hours  Preliminary   02/12/2023 No growth at 24 hours  Preliminary     No results found for: URINECX  No results found for: WOUNDCX  No results found for: STOOLCX  No results found for: RESPCX  Pain Management Panel     Pain Management Panel Latest Ref Rng & Units 6/2/2021 8/19/2018    AMPHETAMINES SCREEN, URINE Negative Negative Negative    BARBITURATES SCREEN Negative Negative Negative    BENZODIAZEPINE SCREEN, URINE Negative Negative Negative    BUPRENORPHINEUR Negative Negative Negative    COCAINE SCREEN, URINE Negative Negative Negative    METHADONE SCREEN, URINE Negative Negative Negative            ----------------------------------------------------------------------------------------------------------------------  Imaging Results (Last 24 Hours)     ** No results found for the last 24 hours. **          -----------------------------------------------------------------------------------  Pertinent Infectious Disease Results     CT abdomen pelvis with contrast on 2/12/2023 showed there is a new decubitus ulcer just to the right of midline at the level of the coccyx with constellation of imaging findings most characteristic of acute infection/osteomyelitis. Small pocket of air and fluid adjacent to the coccyx measuring 3.7 x 4.1 cm could reflect phlegmon or abscess. Chronic bilateral decubitus ulcers at the level of the ischial tuberosities bilaterally with imaging findings suggestive of chronic infection/osteomyelitis, similar to prior. Hepatic steatosis and hepatomegaly with borderline splenomegaly. Nonobstructing left renal stones. Postoperative changes of left lower quadrant and colostomy and right mid abdominal ileal conduit formation.  Diverticulosis. No diverticulitis or bowel obstruction. COVID-19 and flu A/B PCR on 2/12/2023 was negative. Wound culture from 3/17/2023 finalized as pan susceptible Enterococcus faecalis and pan susceptible Enterococcus avium. Respiratory panel on 3/22/2023 was negative.  Chest x-ray on 3/22/2023 was unremarkable.    Status post excisional debridement of sacral ulcer on 2/13/2023 with Dr. Taylor.    Status post debridement with deep tissue culture with wound care APRN Cathie Handy on 3/17/2023.    Assessment/Plan         ASSESSMENT:    Septic shock with lactic acid greater than 4 on admission  Acute and chronic decubitus ulcerations with osteomyelitis and possible abscess      PLAN:      Patient resting in bed this morning.  Drowsy this morning.  Currently on room air with no apparent distress.  Afebrile, no reported increase in ostomy output.  Lungs clear to auscultation bilaterally.    Recommend to continue on Unasyn 3 g IV every 6 hours until 4/3/2023 for enterococcal sacral wound infection.  We will continue to follow closely.     Code Status:   Code Status and Medical Interventions:   Ordered at: 02/21/23 1034     Code Status (Patient has no pulse and is not breathing):    CPR (Attempt to Resuscitate)     Medical Interventions (Patient has pulse or is breathing):    Full Support       GUANACO Flores  04/01/23  10:44 EDT

## 2023-04-02 LAB
GLUCOSE BLDC GLUCOMTR-MCNC: 167 MG/DL (ref 70–130)
GLUCOSE BLDC GLUCOMTR-MCNC: 175 MG/DL (ref 70–130)
GLUCOSE BLDC GLUCOMTR-MCNC: 203 MG/DL (ref 70–130)
GLUCOSE BLDC GLUCOMTR-MCNC: 213 MG/DL (ref 70–130)
GLUCOSE BLDC GLUCOMTR-MCNC: 259 MG/DL (ref 70–130)

## 2023-04-02 PROCEDURE — 25010000002 AMPICILLIN-SULBACTAM PER 1.5 G: Performed by: INTERNAL MEDICINE

## 2023-04-02 PROCEDURE — 63710000001 INSULIN GLARGINE PER 5 UNITS: Performed by: HOSPITALIST

## 2023-04-02 PROCEDURE — 63710000001 INSULIN LISPRO (HUMAN) PER 5 UNITS: Performed by: HOSPITALIST

## 2023-04-02 PROCEDURE — 82962 GLUCOSE BLOOD TEST: CPT

## 2023-04-02 RX ADMIN — PANTOPRAZOLE SODIUM 40 MG: 40 TABLET, DELAYED RELEASE ORAL at 20:51

## 2023-04-02 RX ADMIN — ARIPIPRAZOLE 10 MG: 10 TABLET ORAL at 20:50

## 2023-04-02 RX ADMIN — CARVEDILOL 12.5 MG: 6.25 TABLET, FILM COATED ORAL at 08:25

## 2023-04-02 RX ADMIN — NYSTATIN: 100000 POWDER TOPICAL at 20:53

## 2023-04-02 RX ADMIN — INSULIN LISPRO 4 UNITS: 100 INJECTION, SOLUTION INTRAVENOUS; SUBCUTANEOUS at 17:33

## 2023-04-02 RX ADMIN — AMPICILLIN SODIUM AND SULBACTAM SODIUM 3 G: 2; 1 INJECTION, POWDER, FOR SOLUTION INTRAMUSCULAR; INTRAVENOUS at 17:34

## 2023-04-02 RX ADMIN — SPIRONOLACTONE 25 MG: 25 TABLET ORAL at 08:28

## 2023-04-02 RX ADMIN — AMPICILLIN SODIUM AND SULBACTAM SODIUM 3 G: 2; 1 INJECTION, POWDER, FOR SOLUTION INTRAMUSCULAR; INTRAVENOUS at 04:58

## 2023-04-02 RX ADMIN — INSULIN GLARGINE 60 UNITS: 100 INJECTION, SOLUTION SUBCUTANEOUS at 20:50

## 2023-04-02 RX ADMIN — GABAPENTIN 800 MG: 400 CAPSULE ORAL at 20:51

## 2023-04-02 RX ADMIN — VITAMINS A AND D OINTMENT 1 APPLICATION: 15.5; 53.4 OINTMENT TOPICAL at 20:52

## 2023-04-02 RX ADMIN — Medication 10 ML: at 20:53

## 2023-04-02 RX ADMIN — Medication 10 ML: at 08:29

## 2023-04-02 RX ADMIN — BACLOFEN 30 MG: 10 TABLET ORAL at 11:40

## 2023-04-02 RX ADMIN — BUMETANIDE 2 MG: 1 TABLET ORAL at 08:26

## 2023-04-02 RX ADMIN — METFORMIN HYDROCHLORIDE 1000 MG: 500 TABLET ORAL at 08:26

## 2023-04-02 RX ADMIN — Medication 1 TABLET: at 08:27

## 2023-04-02 RX ADMIN — METFORMIN HYDROCHLORIDE 1000 MG: 500 TABLET ORAL at 17:34

## 2023-04-02 RX ADMIN — DULOXETINE HYDROCHLORIDE 60 MG: 60 CAPSULE, DELAYED RELEASE ORAL at 20:51

## 2023-04-02 RX ADMIN — SUCRALFATE 1 G: 1 TABLET ORAL at 08:28

## 2023-04-02 RX ADMIN — DAKIN'S SOLUTION 0.125% (QUARTER STRENGTH): 0.12 SOLUTION at 20:53

## 2023-04-02 RX ADMIN — NYSTATIN: 100000 POWDER TOPICAL at 08:29

## 2023-04-02 RX ADMIN — SACUBITRIL AND VALSARTAN 1 TABLET: 24; 26 TABLET, FILM COATED ORAL at 20:51

## 2023-04-02 RX ADMIN — APIXABAN 5 MG: 5 TABLET, FILM COATED ORAL at 20:51

## 2023-04-02 RX ADMIN — INSULIN LISPRO 2 UNITS: 100 INJECTION, SOLUTION INTRAVENOUS; SUBCUTANEOUS at 11:46

## 2023-04-02 RX ADMIN — DICYCLOMINE HYDROCHLORIDE 10 MG: 10 CAPSULE ORAL at 20:51

## 2023-04-02 RX ADMIN — INSULIN GLARGINE 60 UNITS: 100 INJECTION, SOLUTION SUBCUTANEOUS at 08:24

## 2023-04-02 RX ADMIN — FERROUS SULFATE TAB 325 MG (65 MG ELEMENTAL FE) 325 MG: 325 (65 FE) TAB at 08:27

## 2023-04-02 RX ADMIN — AMPICILLIN SODIUM AND SULBACTAM SODIUM 3 G: 2; 1 INJECTION, POWDER, FOR SOLUTION INTRAMUSCULAR; INTRAVENOUS at 23:09

## 2023-04-02 RX ADMIN — INSULIN LISPRO 30 UNITS: 100 INJECTION, SOLUTION INTRAVENOUS; SUBCUTANEOUS at 11:46

## 2023-04-02 RX ADMIN — DICYCLOMINE HYDROCHLORIDE 10 MG: 10 CAPSULE ORAL at 08:28

## 2023-04-02 RX ADMIN — GABAPENTIN 800 MG: 400 CAPSULE ORAL at 16:40

## 2023-04-02 RX ADMIN — PANTOPRAZOLE SODIUM 40 MG: 40 TABLET, DELAYED RELEASE ORAL at 08:26

## 2023-04-02 RX ADMIN — INSULIN LISPRO 2 UNITS: 100 INJECTION, SOLUTION INTRAVENOUS; SUBCUTANEOUS at 08:24

## 2023-04-02 RX ADMIN — AMPICILLIN SODIUM AND SULBACTAM SODIUM 3 G: 2; 1 INJECTION, POWDER, FOR SOLUTION INTRAMUSCULAR; INTRAVENOUS at 11:40

## 2023-04-02 RX ADMIN — INSULIN LISPRO 30 UNITS: 100 INJECTION, SOLUTION INTRAVENOUS; SUBCUTANEOUS at 08:24

## 2023-04-02 RX ADMIN — MAGNESIUM GLUCONATE 500 MG ORAL TABLET 1200 MG: 500 TABLET ORAL at 08:26

## 2023-04-02 RX ADMIN — Medication 1 CAPSULE: at 08:25

## 2023-04-02 RX ADMIN — BACLOFEN 30 MG: 10 TABLET ORAL at 08:25

## 2023-04-02 RX ADMIN — BACLOFEN 30 MG: 10 TABLET ORAL at 17:34

## 2023-04-02 RX ADMIN — ATORVASTATIN CALCIUM 10 MG: 10 TABLET, FILM COATED ORAL at 20:51

## 2023-04-02 RX ADMIN — GABAPENTIN 800 MG: 400 CAPSULE ORAL at 08:24

## 2023-04-02 RX ADMIN — VITAMINS A AND D OINTMENT 1 APPLICATION: 15.5; 53.4 OINTMENT TOPICAL at 08:29

## 2023-04-02 RX ADMIN — INSULIN LISPRO 30 UNITS: 100 INJECTION, SOLUTION INTRAVENOUS; SUBCUTANEOUS at 17:34

## 2023-04-02 RX ADMIN — SACUBITRIL AND VALSARTAN 1 TABLET: 24; 26 TABLET, FILM COATED ORAL at 08:27

## 2023-04-02 RX ADMIN — INSULIN GLARGINE 15 UNITS: 100 INJECTION, SOLUTION SUBCUTANEOUS at 11:46

## 2023-04-02 RX ADMIN — EMPAGLIFLOZIN 10 MG: 10 TABLET, FILM COATED ORAL at 08:27

## 2023-04-02 RX ADMIN — CARVEDILOL 12.5 MG: 6.25 TABLET, FILM COATED ORAL at 17:34

## 2023-04-02 RX ADMIN — OXYCODONE HYDROCHLORIDE AND ACETAMINOPHEN 500 MG: 500 TABLET ORAL at 08:28

## 2023-04-02 RX ADMIN — MODAFINIL 200 MG: 100 TABLET ORAL at 08:24

## 2023-04-02 RX ADMIN — DULOXETINE HYDROCHLORIDE 60 MG: 60 CAPSULE, DELAYED RELEASE ORAL at 08:25

## 2023-04-02 RX ADMIN — BACLOFEN 30 MG: 10 TABLET ORAL at 20:51

## 2023-04-02 RX ADMIN — APIXABAN 5 MG: 5 TABLET, FILM COATED ORAL at 08:27

## 2023-04-02 RX ADMIN — Medication 1000 UNITS: at 08:25

## 2023-04-02 RX ADMIN — DAKIN'S SOLUTION 0.125% (QUARTER STRENGTH): 0.12 SOLUTION at 08:29
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
## 2023-04-03 LAB
GLUCOSE BLDC GLUCOMTR-MCNC: 153 MG/DL (ref 70–130)
GLUCOSE BLDC GLUCOMTR-MCNC: 233 MG/DL (ref 70–130)
GLUCOSE BLDC GLUCOMTR-MCNC: 234 MG/DL (ref 70–130)
GLUCOSE BLDC GLUCOMTR-MCNC: 258 MG/DL (ref 70–130)
GLUCOSE BLDC GLUCOMTR-MCNC: 258 MG/DL (ref 70–130)

## 2023-04-03 PROCEDURE — 25010000002 AMPICILLIN-SULBACTAM PER 1.5 G: Performed by: INTERNAL MEDICINE

## 2023-04-03 PROCEDURE — 63710000001 INSULIN GLARGINE PER 5 UNITS: Performed by: HOSPITALIST

## 2023-04-03 PROCEDURE — 82962 GLUCOSE BLOOD TEST: CPT

## 2023-04-03 PROCEDURE — 63710000001 INSULIN LISPRO (HUMAN) PER 5 UNITS: Performed by: HOSPITALIST

## 2023-04-03 PROCEDURE — 99231 SBSQ HOSP IP/OBS SF/LOW 25: CPT | Performed by: INTERNAL MEDICINE

## 2023-04-03 PROCEDURE — 99309 SBSQ NF CARE MODERATE MDM 30: CPT | Performed by: INTERNAL MEDICINE

## 2023-04-03 RX ADMIN — MAGNESIUM GLUCONATE 500 MG ORAL TABLET 1200 MG: 500 TABLET ORAL at 08:40

## 2023-04-03 RX ADMIN — APIXABAN 5 MG: 5 TABLET, FILM COATED ORAL at 20:48

## 2023-04-03 RX ADMIN — NYSTATIN: 100000 POWDER TOPICAL at 08:45

## 2023-04-03 RX ADMIN — APIXABAN 5 MG: 5 TABLET, FILM COATED ORAL at 08:41

## 2023-04-03 RX ADMIN — BACLOFEN 30 MG: 10 TABLET ORAL at 17:17

## 2023-04-03 RX ADMIN — DAKIN'S SOLUTION 0.125% (QUARTER STRENGTH): 0.12 SOLUTION at 20:51

## 2023-04-03 RX ADMIN — INSULIN GLARGINE 75 UNITS: 100 INJECTION, SOLUTION SUBCUTANEOUS at 06:04

## 2023-04-03 RX ADMIN — GABAPENTIN 800 MG: 400 CAPSULE ORAL at 20:48

## 2023-04-03 RX ADMIN — DULOXETINE HYDROCHLORIDE 60 MG: 60 CAPSULE, DELAYED RELEASE ORAL at 20:48

## 2023-04-03 RX ADMIN — DICYCLOMINE HYDROCHLORIDE 10 MG: 10 CAPSULE ORAL at 08:42

## 2023-04-03 RX ADMIN — ATORVASTATIN CALCIUM 10 MG: 10 TABLET, FILM COATED ORAL at 20:48

## 2023-04-03 RX ADMIN — INSULIN LISPRO 4 UNITS: 100 INJECTION, SOLUTION INTRAVENOUS; SUBCUTANEOUS at 12:08

## 2023-04-03 RX ADMIN — VITAMINS A AND D OINTMENT 1 APPLICATION: 15.5; 53.4 OINTMENT TOPICAL at 12:08

## 2023-04-03 RX ADMIN — BACLOFEN 30 MG: 10 TABLET ORAL at 08:39

## 2023-04-03 RX ADMIN — BACLOFEN 30 MG: 10 TABLET ORAL at 20:48

## 2023-04-03 RX ADMIN — BUMETANIDE 2 MG: 1 TABLET ORAL at 08:40

## 2023-04-03 RX ADMIN — GABAPENTIN 800 MG: 400 CAPSULE ORAL at 08:39

## 2023-04-03 RX ADMIN — INSULIN LISPRO 2 UNITS: 100 INJECTION, SOLUTION INTRAVENOUS; SUBCUTANEOUS at 17:18

## 2023-04-03 RX ADMIN — AMPICILLIN SODIUM AND SULBACTAM SODIUM 3 G: 2; 1 INJECTION, POWDER, FOR SOLUTION INTRAMUSCULAR; INTRAVENOUS at 04:17

## 2023-04-03 RX ADMIN — Medication 1 TABLET: at 08:40

## 2023-04-03 RX ADMIN — FERROUS SULFATE TAB 325 MG (65 MG ELEMENTAL FE) 325 MG: 325 (65 FE) TAB at 08:43

## 2023-04-03 RX ADMIN — Medication 10 ML: at 08:46

## 2023-04-03 RX ADMIN — EMPAGLIFLOZIN 10 MG: 10 TABLET, FILM COATED ORAL at 08:40

## 2023-04-03 RX ADMIN — GABAPENTIN 800 MG: 400 CAPSULE ORAL at 16:19

## 2023-04-03 RX ADMIN — Medication 1 CAPSULE: at 08:42

## 2023-04-03 RX ADMIN — INSULIN LISPRO 30 UNITS: 100 INJECTION, SOLUTION INTRAVENOUS; SUBCUTANEOUS at 17:17

## 2023-04-03 RX ADMIN — OXYCODONE HYDROCHLORIDE AND ACETAMINOPHEN 500 MG: 500 TABLET ORAL at 08:43

## 2023-04-03 RX ADMIN — PANTOPRAZOLE SODIUM 40 MG: 40 TABLET, DELAYED RELEASE ORAL at 20:48

## 2023-04-03 RX ADMIN — DULOXETINE HYDROCHLORIDE 60 MG: 60 CAPSULE, DELAYED RELEASE ORAL at 08:43

## 2023-04-03 RX ADMIN — INSULIN LISPRO 30 UNITS: 100 INJECTION, SOLUTION INTRAVENOUS; SUBCUTANEOUS at 12:08

## 2023-04-03 RX ADMIN — VITAMINS A AND D OINTMENT 1 APPLICATION: 15.5; 53.4 OINTMENT TOPICAL at 20:51

## 2023-04-03 RX ADMIN — SACUBITRIL AND VALSARTAN 1 TABLET: 24; 26 TABLET, FILM COATED ORAL at 08:40

## 2023-04-03 RX ADMIN — SPIRONOLACTONE 25 MG: 25 TABLET ORAL at 08:42

## 2023-04-03 RX ADMIN — MODAFINIL 200 MG: 100 TABLET ORAL at 08:39

## 2023-04-03 RX ADMIN — DICYCLOMINE HYDROCHLORIDE 10 MG: 10 CAPSULE ORAL at 20:48

## 2023-04-03 RX ADMIN — INSULIN LISPRO 30 UNITS: 100 INJECTION, SOLUTION INTRAVENOUS; SUBCUTANEOUS at 08:45

## 2023-04-03 RX ADMIN — METFORMIN HYDROCHLORIDE 1000 MG: 500 TABLET ORAL at 18:00

## 2023-04-03 RX ADMIN — INSULIN GLARGINE 60 UNITS: 100 INJECTION, SOLUTION SUBCUTANEOUS at 20:48

## 2023-04-03 RX ADMIN — Medication 10 ML: at 20:51

## 2023-04-03 RX ADMIN — DAKIN'S SOLUTION 0.125% (QUARTER STRENGTH): 0.12 SOLUTION at 08:45

## 2023-04-03 RX ADMIN — INSULIN LISPRO 4 UNITS: 100 INJECTION, SOLUTION INTRAVENOUS; SUBCUTANEOUS at 08:45

## 2023-04-03 RX ADMIN — SUCRALFATE 1 G: 1 TABLET ORAL at 08:42

## 2023-04-03 RX ADMIN — PANTOPRAZOLE SODIUM 40 MG: 40 TABLET, DELAYED RELEASE ORAL at 08:43

## 2023-04-03 RX ADMIN — SACUBITRIL AND VALSARTAN 1 TABLET: 24; 26 TABLET, FILM COATED ORAL at 20:48

## 2023-04-03 RX ADMIN — Medication 1000 UNITS: at 08:42

## 2023-04-03 RX ADMIN — BACLOFEN 30 MG: 10 TABLET ORAL at 12:08

## 2023-04-03 RX ADMIN — METFORMIN HYDROCHLORIDE 1000 MG: 500 TABLET ORAL at 08:40

## 2023-04-03 RX ADMIN — NYSTATIN: 100000 POWDER TOPICAL at 20:51

## 2023-04-03 RX ADMIN — ARIPIPRAZOLE 10 MG: 10 TABLET ORAL at 20:48

## 2023-04-03 NOTE — CASE MANAGEMENT/SOCIAL WORK
Discharge Planning Assessment  [facility]     Patient Name: Ken Krueger  MRN: [id number]  Today's Date: 4/3/2023    Admit Date: 2/21/2023    Plan: Pt was admitted to swing Bed on 2/21/23 for IV antibiotics. Pt has been approved for additional swing bed days through 4/4/23 with expected dishcarge date 4/5/23. Pt lives with his mother and brother and he plans to return home at discharge. Pt utilized [facility] prior to admission. [facility] [phone] to be contacted at discharge. Pt has a hospital bed, trapeze bar, patricio lift, and wheelchair via [facility] and an electric wheelchair. SS to follow.         Discharge Plan     Row Name 04/03/23 1358       Plan    Plan Pt was admitted to swing Bed on 2/21/23 for IV antibiotics. Pt has been approved for additional swing bed days through 4/4/23 with expected dishcarge date 4/5/23. Pt lives with his mother and brother and he plans to return home at discharge. Pt utilized [facility] prior to admission. [facility] [employer] [phone] to be contacted at discharge. Pt has a hospital bed, trapeze bar, patricio lift, and wheelchair via [facility] and an electric wheelchair. SS to follow.                HUMPHREY Loya

## 2023-04-03 NOTE — PROGRESS NOTES
PROGRESS NOTE         Patient Identification:  Name:  Ken Krueger  Age:  47 y.o.  Sex:  male  :  1975  MRN:  [id number]  Visit Number:  [id number]  Primary Care Provider:  Franchesca Hodges APRN         LOS: 41 days       ----------------------------------------------------------------------------------------------------------------------  Subjective       Chief Complaints:    No chief complaint on file.      Interval History:      Patient resting comfortably in bed this morning.  Currently on room air with no apparent distress.  Lungs clear to auscultation bilaterally.  Afebrile, no increase in ostomy output.  Abdomen soft, nontender.  Antibiotic course to complete today.    Review of Systems:    Constitutional: No fever, chills. No night sweats. No appetite change or unexpected weight change. No fatigue.  Eyes: no eye drainage, itching or redness.  HEENT: no mouth sores, dysphagia or nose bleed.   Respiratory: no for shortness of breath.    Cardiovascular: no chest pain, no palpitations, no orthopnea.  Gastrointestinal: no nausea, vomiting or diarrhea. No abdominal pain, hematemesis or rectal bleeding.  Genitourinary: no dysuria or polyuria.  Hematologic/lymphatic: no lymph node abnormalities, no easy bruising or easy bleeding.  Musculoskeletal: no muscle or joint pain.  Skin: No rash and no itching.  Neurological: no loss of consciousness, no seizure, no headache.  Behavioral/Psych: no depression or suicidal ideation.  Endocrine: no hot flashes.  Immunologic: negative.    ----------------------------------------------------------------------------------------------------------------------      Objective       [facility] Meds:    Pharmacy Consult - Pharmacy to dose,       ----------------------------------------------------------------------------------------------------------------------    Vital Signs:  Temp:  [97.7 °F (36.5 °C)-97.9 °F (36.6 °C)] 97.7 °F (36.5 °C)  Heart Rate:  [78-82]  82  Resp:  [18] 18  BP: (107-110)/(55-61) 107/55  Mean Arterial Pressure (Non-Invasive) for the past 24 hrs (Last 3 readings):   Noninvasive MAP (mmHg)   04/03/23 0600 74     SpO2 Percentage    03/30/23 1900 04/01/23 0600 04/02/23 0638   SpO2: 97% 95% 94%        on  Flow (L/min):  [3] 3;   Device (Oxygen Therapy): room air    Body mass index is 42.34 kg/m².  Wt Readings from Last 3 Encounters:   02/21/23 (!) 138 kg (303 lb 9.2 oz)   02/20/23 (!) 139 kg (306 lb 14.1 oz)   12/13/22 (!) 141 kg (310 lb 14.4 oz)        Intake/Output Summary (Last 24 hours) at 4/3/2023 1057  Last data filed at 4/3/2023 0330  Gross per 24 hour   Intake 2100 ml   Output 4500 ml   Net -2400 ml     Diet: Diabetic Diets; Consistent Carbohydrate; Texture: Regular Texture (IDDSI 7); Fluid Consistency: Thin (IDDSI 0)  ----------------------------------------------------------------------------------------------------------------------      Physical Exam:    Constitutional: Morbidly obese  male is resting in bed on room air in no apparent distress.  HENT:  Head: Normocephalic and atraumatic.  Mouth:  Moist mucous membranes.    Eyes:  Conjunctivae and EOM are normal.  No scleral icterus.  Neck:  Neck supple.  No JVD present.    Cardiovascular:  Normal rate, regular rhythm and normal heart sounds with no murmur. No edema.  Pulmonary/Chest:  No respiratory distress, with no wheezing, rhonchi, or rales.  Clear to auscultation bilaterally.  Currently on room air.  Abdominal:  Morbidly obese.  Soft.  Bowel sounds are normal.  No distension and no tenderness.  Ileal conduit in place.  Colostomy bag in place with soft stools.  Musculoskeletal:  No tenderness.  No swelling or redness of joints.  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 PICC to right upper arm with no signs of infection.  Neurological:  Alert and oriented to person, place, and time.  No facial droop.  No slurred speech.   Skin:  Stage IV sacral decubitus wound.  Dressing in place.   Psychiatric:   Normal mood and affect.  Behavior is normal.      ----------------------------------------------------------------------------------------------------------------------              Results from last 7 days   Lab Units 03/30/23  0159 03/29/23  0112 03/28/23  0323   CRP mg/dL 8.11* 9.04*  --    WBC 10*3/mm3 9.98 13.24* 12.19*   HEMOGLOBIN g/dL 10.2* 10.9* 10.2*   HEMATOCRIT % 35.3* 37.6 35.9*   MCV fL 88.5 87.9 88.6   MCHC g/dL 28.9* 29.0* 28.4*   PLATELETS 10*3/mm3 335 396 319     Results from last 7 days   Lab Units 03/28/23  0323   SODIUM mmol/L 136   POTASSIUM mmol/L 3.7   MAGNESIUM mg/dL 1.9   CHLORIDE mmol/L 99   CO2 mmol/L 25.3   BUN mg/dL 48*   CREATININE mg/dL 1.13   CALCIUM mg/dL 9.0   GLUCOSE mg/dL 153*   Estimated Creatinine Clearance: 116.8 mL/min (by C-G formula based on SCr of 1.13 mg/dL).  No results found for: AMMONIA    Glucose   Date/Time Value Ref Range Status   04/03/2023 0638 233 (H) 70 - 130 mg/dL Final     Comment:     Meter: [id number] : [id number] Cierra De   04/03/2023 0553 258 (H) 70 - 130 mg/dL Final     Comment:     Meter: [id number] : [id number] WILBER BERTRAND   04/02/2023 2320 203 (H) 70 - 130 mg/dL Final     Comment:     Meter: [id number] : [id number] WILBER BERTRAND   04/02/2023 1948 259 (H) 70 - 130 mg/dL Final     Comment:     Meter: [id number] : [id number] WILBER BERTRAND   04/02/2023 1642 213 (H) 70 - 130 mg/dL Final     Comment:     Meter: [id number] : [id number] HEBERTSAMUEL DOEWELL   04/02/2023 1140 175 (H) 70 - 130 mg/dL Final     Comment:     Meter: [id number] : [id number] Matteo Estrella   04/02/2023 0617 167 (H) 70 - 130 mg/dL Final     Comment:     Meter: [id number] : [id number] RUBAMARTIN ASIF   04/01/2023 2005 289 (H) 70 - 130 mg/dL Final     Comment:     Meter: [id number] : [id number] JULIANNA BARRETT     Lab Results   Component Value Date    HGBA1C 11.50 (H) 02/13/2023     Lab Results   Component Value Date    TSH 3.780 07/19/2022    FREET4 1.31  06/02/2021       Blood Culture   Date Value Ref Range Status   02/12/2023 No growth at 24 hours  Preliminary   02/12/2023 No growth at 24 hours  Preliminary     No results found for: URINECX  No results found for: WOUNDCX  No results found for: STOOLCX  No results found for: RESPCX  Pain Management Panel       Pain Management Panel Latest Ref Rng & Units 6/2/2021 8/19/2018    AMPHETAMINES SCREEN, URINE Negative Negative Negative    BARBITURATES SCREEN Negative Negative Negative    BENZODIAZEPINE SCREEN, URINE Negative Negative Negative    BUPRENORPHINEUR Negative Negative Negative    COCAINE SCREEN, URINE Negative Negative Negative    METHADONE SCREEN, URINE Negative Negative Negative              ----------------------------------------------------------------------------------------------------------------------  Imaging Results (Last 24 Hours)       ** No results found for the last 24 hours. **            -----------------------------------------------------------------------------------  Pertinent Infectious Disease Results     CT abdomen pelvis with contrast on 2/12/2023 showed there is a new decubitus ulcer just to the right of midline at the level of the coccyx with constellation of imaging findings most characteristic of acute infection/osteomyelitis. Small pocket of air and fluid adjacent to the coccyx measuring 3.7 x 4.1 cm could reflect phlegmon or abscess. Chronic bilateral decubitus ulcers at the level of the ischial tuberosities bilaterally with imaging findings suggestive of chronic infection/osteomyelitis, similar to prior. Hepatic steatosis and hepatomegaly with borderline splenomegaly. Nonobstructing left renal stones. Postoperative changes of left lower quadrant and colostomy and right mid abdominal ileal conduit formation. Diverticulosis. No diverticulitis or bowel obstruction. COVID-19 and flu A/B PCR on 2/12/2023 was negative. Wound culture from 3/17/2023 finalized as pan susceptible  Enterococcus faecalis and pan susceptible Enterococcus avium. Respiratory panel on 3/22/2023 was negative.  Chest x-ray on 3/22/2023 was unremarkable.    Status post excisional debridement of sacral ulcer on 2/13/2023 with Dr. Taylor.          Assessment/Plan         ASSESSMENT:    Septic shock with lactic acid greater than 4 on admission  Acute and chronic decubitus ulcerations with osteomyelitis and possible abscess      PLAN:    I saw the patient this morning with GUANACO Flores and got report from primary RN and here are my findings:    Status post debridement with deep tissue culture with wound care APRN Cathie Handy on 3/17/2023.  The patient is currently resting comfortably in bed, showing no signs of distress while breathing room air. A thorough bilateral lung auscultation indicates clear lungs. The patient is afebrile with no increase in ostomy output. Upon examination, the abdomen is soft and nontender. The patient is nearing completion of the antibiotic course for enterococcal or sacral wound infection, having received Unasyn 3 g IV every 6 hours. The treatment is scheduled to conclude today, 4/3/2023.      Code Status:   Code Status and Medical Interventions:   Ordered at: 02/21/23 1034     Code Status (Patient has no pulse and is not breathing):    CPR (Attempt to Resuscitate)     Medical Interventions (Patient has pulse or is breathing):    Full Support       GUANACO Flores  04/03/23  10:57 EDT    Electronically signed by GUANACO Flores, 04/03/23, 10:58 AM EDT.    Electronically signed by Tra Jain MD, 04/03/23, 12:27 PM EDT.

## 2023-04-03 NOTE — PROGRESS NOTES
[facility] HOSPITALIST PROGRESS NOTE     Patient Identification:  Name:  Ken Krueger  Age:  47 y.o.  Sex:  male  :  1975  MRN:  [id number]  Visit Number:  [id number]  ROOM: [address] [place] [address]     Primary Care Provider:  Franchesca Hodges APRN    Length of stay in inpatient status:  41    Subjective     Chief Compliant:  No chief complaint on file.      History of Presenting Illness:    Patient denied any new complaints. He reported he anticipated to go home tomorrow. He noted his brother will continue to help care for him. He said he will call his family and prepare to return home tomorrow. He denied any needs at discharge other than to continue home health. He did report he was going to try to decrease carb intake to get better control of his DM. No family bedside.     ROS:  Otherwise 10 point ROS negative other than documented above in HPI.     Objective     Current Hospital Meds:Acidophilus/Pectin, 1 capsule, Oral, Daily  apixaban, 5 mg, Oral, Q12H  ARIPiprazole, 10 mg, Oral, Nightly  ascorbic acid, 500 mg, Oral, Daily  atorvastatin, 10 mg, Oral, Nightly  baclofen, 30 mg, Oral, 4x Daily With Meals & Nightly  bumetanide, 2 mg, Oral, Daily  carvedilol, 12.5 mg, Oral, BID With Meals  cholecalciferol, 1,000 Units, Oral, Daily  dicyclomine, 10 mg, Oral, BID  DULoxetine, 60 mg, Oral, BID  empagliflozin, 10 mg, Oral, Daily  ferrous sulfate, 325 mg, Oral, Daily With Breakfast  gabapentin, 800 mg, Oral, TID  insulin glargine, 60 Units, Subcutaneous, Nightly  insulin glargine, 75 Units, Subcutaneous, QAM  insulin lispro, 2-9 Units, Subcutaneous, TID With Meals  insulin lispro, 30 Units, Subcutaneous, TID With Meals  magic barrier cream, 1 application, Topical, BID  magnesium oxide, 1,200 mg, Oral, Daily  metFORMIN, 1,000 mg, Oral, BID With Meals  modafinil, 200 mg, Oral, Daily  multivitamin with minerals, 1 tablet, Oral, Daily  nystatin, , Topical, Q12H  pantoprazole, 40 mg, Oral, BID  sacubitril-valsartan, 1  tablet, Oral, Q12H  sodium chloride, 10 mL, Intravenous, Q12H  sodium hypochlorite, , Topical, Q12H  spironolactone, 25 mg, Oral, Daily  sucralfate, 1 g, Oral, Daily    Pharmacy Consult - Pharmacy to dose,         Current Antimicrobial Therapy:  Anti-Infectives (From admission, onward)    Ordered     Dose/Rate Route Frequency Start Stop    03/20/23 1013  ampicillin-sulbactam (UNASYN) 3 g in sodium chloride 0.9 % 100 mL IVPB-VTB        Ordering Provider: Tra Jain MD    3 g  over 30 Minutes Intravenous Every 6 Hours 03/20/23 1100 04/03/23 0447    03/19/23 0845  vancomycin 2500 mg/500 mL 0.9% NS IVPB ([facility])        Ordering Provider: Verenice Deluca APRN    2,500 mg  over 180 Minutes Intravenous Once 03/19/23 1000 03/19/23 1424        Current Diuretic Therapy:  Diuretics (From admission, onward)    Ordered     Dose/Rate Route Frequency Start Stop    03/31/23 0809  bumetanide (BUMEX) tablet 2 mg        Ordering Provider: Yovana Pack MD    2 mg Oral Daily 04/01/23 0900      03/31/23 0808  bumetanide (BUMEX) 2 mg in sodium chloride 0.9 % 50 mL IVPB        Ordering Provider: Yovana Pack MD    2 mg  25 mL/hr over 2 Hours Intravenous Once 03/31/23 1000 03/31/23 1225    03/31/23 0808  metOLazone (ZAROXOLYN) tablet 2.5 mg        Ordering Provider: Yovana Pack MD    2.5 mg Oral Daily 03/31/23 0900 03/31/23 0932    03/27/23 0929  bumetanide (BUMEX) 2 mg in sodium chloride 0.9 % 50 mL IVPB        Ordering Provider: Yovana Pack MD    2 mg  12.5 mL/hr over 4 Hours Intravenous Once 03/27/23 1100 03/27/23 1558    03/27/23 0929  metOLazone (ZAROXOLYN) tablet 2.5 mg        Ordering Provider: Yovana Pack MD    2.5 mg Oral Once 03/27/23 1030 03/27/23 1110    03/26/23 1709  bumetanide (BUMEX) injection 2 mg        Ordering Provider: Brannon Adrian MD    2 mg Intravenous Once 03/26/23 1800 03/26/23 1735    03/19/23 1032  metOLazone (ZAROXOLYN) tablet 2.5 mg        Ordering Provider:  Yovana Pack MD    2.5 mg Oral Once 03/19/23 1200 03/19/23 1236    03/14/23 0725  metOLazone (ZAROXOLYN) tablet 2.5 mg        Ordering Provider: Yovana Pack MD    2.5 mg Oral Daily 03/14/23 0900 03/14/23 1004    02/21/23 1034  spironolactone (ALDACTONE) tablet 25 mg        Ordering Provider: Robinson Yanes DO    25 mg Oral Daily 02/22/23 0900          ----------------------------------------------------------------------------------------------------------------------  Vital Signs:  Temp:  [97.7 °F (36.5 °C)-97.9 °F (36.6 °C)] 97.7 °F (36.5 °C)  Heart Rate:  [78-82] 82  Resp:  [18] 18  BP: (107-110)/(55-61) 107/55     on  Flow (L/min):  [3] 3;   Device (Oxygen Therapy): room air  Body mass index is 42.34 kg/m².    Wt Readings from Last 3 Encounters:   02/21/23 (!) 138 kg (303 lb 9.2 oz)   02/20/23 (!) 139 kg (306 lb 14.1 oz)   12/13/22 (!) 141 kg (310 lb 14.4 oz)     Intake & Output (last 3 days)       03/31 0701 04/01 0700 04/01 0701 04/02 0700 04/02 0701 04/03 0700 04/03 0701  04/04 0700    P.O. 1093 390 4730     I.V. (mL/kg)  1110 (8)      IV Piggyback   100     Total Intake(mL/kg) 1000 (7.2) 1530 (11.1) 2700 (19.6)     Urine (mL/kg/hr) 4650 (1.4) 4900 (1.5) 4750 (1.4) 1700 (1.8)    Stool 1450 600 750     Total Output 6100 5500 5500 1700    Net -5100 -3970 -2800 -1700                Diet: Diabetic Diets; Consistent Carbohydrate; Texture: Regular Texture (IDDSI 7); Fluid Consistency: Thin (IDDSI 0)  ----------------------------------------------------------------------------------------------------------------------  Physical exam:  Constitutional:  Obese. In no distress.   HENT:  Head:  Normocephalic and atraumatic.  Mouth:  Moist mucous membranes.    Eyes:  Conjunctivae and EOM are normal. No scleral icterus.    Neck:  Neck supple.  No JVD present.    Cardiovascular:  Normal rate, regular rhythm and normal heart sounds with no murmur.  Pulmonary/Chest:  No respiratory distress, no wheezes,  no crackles, with normal breath sounds and good air movement.  Abdominal:  Soft.  Bowel sounds are normal.  No distension and no tenderness.   Musculoskeletal:  No edema, no tenderness, and no deformity.  No red or swollen joints anywhere.    Neurological:  Alert and oriented to person, place, and time.    Skin:  Skin is warm and dry. No rash noted. No pallor.   Peripheral vascular:  Pulses in all 4 extremities with no clubbing, no cyanosis, no edema.  ----------------------------------------------------------------------------------------------------------------------  Tele:    ----------------------------------------------------------------------------------------------------------------------  Results from last 7 days   Lab Units 03/30/23  0159 03/29/23  0112 03/28/23  0323   CRP mg/dL 8.11* 9.04*  --    WBC 10*3/mm3 9.98 13.24* 12.19*   HEMOGLOBIN g/dL 10.2* 10.9* 10.2*   HEMATOCRIT % 35.3* 37.6 35.9*   MCV fL 88.5 87.9 88.6   MCHC g/dL 28.9* 29.0* 28.4*   PLATELETS 10*3/mm3 335 396 319         Results from last 7 days   Lab Units 03/28/23  0323   SODIUM mmol/L 136   POTASSIUM mmol/L 3.7   MAGNESIUM mg/dL 1.9   CHLORIDE mmol/L 99   CO2 mmol/L 25.3   BUN mg/dL 48*   CREATININE mg/dL 1.13   CALCIUM mg/dL 9.0   GLUCOSE mg/dL 153*   Estimated Creatinine Clearance: 116.8 mL/min (by C-G formula based on SCr of 1.13 mg/dL).  No results found for: AMMONIA              Glucose   Date/Time Value Ref Range Status   04/03/2023 1057 234 (H) 70 - 130 mg/dL Final     Comment:     Meter: [id number] : [id number] Cierra De   04/03/2023 0638 233 (H) 70 - 130 mg/dL Final     Comment:     Meter: [id number] : [id number] Cierra Ruvalc[place]acomb   04/03/2023 0553 258 (H) 70 - 130 mg/dL Final     Comment:     Meter: [id number] : [id number] WILBER BERTRAND   04/02/2023 2320 203 (H) 70 - 130 mg/dL Final     Comment:     Meter: [id number] : [id number] WILBER BERTRAND   04/02/2023 1948 259 (H) 70 - 130 mg/dL Final     Comment:      Meter: [id number] : [id number] WILBER BERTRAND   04/02/2023 1642 213 (H) 70 - 130 mg/dL Final     Comment:     Meter: [id number] : [id number] JOHN LIANG   04/02/2023 1140 175 (H) 70 - 130 mg/dL Final     Comment:     Meter: [id number] : [id number] Matteo Estrella   04/02/2023 0617 167 (H) 70 - 130 mg/dL Final     Comment:     Meter: [id number] : [id number] NINA ASIF     Lab Results   Component Value Date    TSH 3.780 07/19/2022    FREET4 1.31 06/02/2021     No results found for: PREGTESTUR, PREGSERUM, HCG, HCGQUANT  Pain Management Panel     Pain Management Panel Latest Ref Rng & Units 6/2/2021 8/19/2018    AMPHETAMINES SCREEN, URINE Negative Negative Negative    BARBITURATES SCREEN Negative Negative Negative    BENZODIAZEPINE SCREEN, URINE Negative Negative Negative    BUPRENORPHINEUR Negative Negative Negative    COCAINE SCREEN, URINE Negative Negative Negative    METHADONE SCREEN, URINE Negative Negative Negative        Brief Urine Lab Results  (Last result in the past 365 days)      Color   Clarity   Blood   Leuk Est   Nitrite   Protein   CREAT   Urine HCG        12/09/22 1928 Yellow   Clear   Trace   Small (1+)   Negative   Negative               No results found for: BLOODCX  No results found for: URINECX  No results found for: WOUNDCX  No results found for: STOOLCX  No results found for: RESPCX  No results found for: AFBCX  Results from last 7 days   Lab Units 03/30/23  0159 03/29/23  0112   CRP mg/dL 8.11* 9.04*       I have personally looked at the labs and they are summarized above.  ----------------------------------------------------------------------------------------------------------------------  Detailed radiology reports for the last 24 hours:    Imaging Results (Last 24 Hours)     ** No results found for the last 24 hours. **        Assessment & Plan    -Stage IV sacral decubiti with osteomyelitis present on admission E. coli on culture  -Morbidly obese complicating all  aspects of care  -Diabetes type 2 with hyperglycemia  -Paraplegia  -History of PE on chronic anticoagulation  -ARLIN on CPAP  -History of nonischemic cardiomyopathy  -History of ileal conduit and colostomy     Blood glucoses in 200's. Patient requested to be off Glucomander protocol for diabetes.  He is now back to basal insulin twice a day with preprandial and sliding scale.  Patient is notable for noncompliance with his diet he has bottles and food from outside that are not sugar-free.  Despite counseling patient appears to be very nonchalant about the need for control of his glucose.  Expressed my concerns to the patient difficulty of controlling his glucose at home specially. I in particular discussed my concern that uncontrolled DM leading to recurrent infections including risk of death. Patient noted understanding and would try to decrease carbs. I discussed sugar free Gatorade as he was drinking regular Gatorade.      Updated.wound culture from 3/17 revealed Enterococcus faecalis and based on sensitivities, ID transitioned abx regimen to Unasyn monotherapy for 14 more days which would complete today.     Volume status improved with IV diuresis which has now been transitioned to PO regimen.      Dispo: Plan is to discharge tomorrow w/ home health.   VTE Prophylaxis:   Mechanical Order History:     None      Pharmalogical Order History:      Ordered     Dose Route Frequency Stop    02/21/23 1034  apixaban (ELIQUIS) tablet 5 mg         5 mg PO Every 12 Hours Scheduled --                  Brannon Adrian MD  [facility] Hospitalist  04/03/23  13:56 EDT

## 2023-04-03 NOTE — PLAN OF CARE
Goal Outcome Evaluation:  Plan of Care Reviewed With: patient        Progress: improving  Outcome Evaluation: Patient has done well today.  Wound care tolerated well.  Continue current plan of care.

## 2023-04-03 NOTE — PLAN OF CARE
Goal Outcome Evaluation:  Plan of Care Reviewed With: patient        Progress: no change   No acute changes during shift. Will continue to follow current plan of care and monitor.

## 2023-04-04 ENCOUNTER — READMISSION MANAGEMENT (OUTPATIENT)
Dept: CALL CENTER | Facility: HOSPITAL | Age: 48
End: 2023-04-04
Payer: MEDICARE

## 2023-04-04 VITALS
RESPIRATION RATE: 18 BRPM | OXYGEN SATURATION: 93 % | WEIGHT: 303.57 LBS | BODY MASS INDEX: 42.5 KG/M2 | HEIGHT: 71 IN | SYSTOLIC BLOOD PRESSURE: 137 MMHG | HEART RATE: 93 BPM | DIASTOLIC BLOOD PRESSURE: 82 MMHG | TEMPERATURE: 98.1 F

## 2023-04-04 LAB
GLUCOSE BLDC GLUCOMTR-MCNC: 139 MG/DL (ref 70–130)
GLUCOSE BLDC GLUCOMTR-MCNC: 175 MG/DL (ref 70–130)
GLUCOSE BLDC GLUCOMTR-MCNC: 176 MG/DL (ref 70–130)
GLUCOSE BLDC GLUCOMTR-MCNC: 206 MG/DL (ref 70–130)

## 2023-04-04 PROCEDURE — 99239 HOSP IP/OBS DSCHRG MGMT >30: CPT | Performed by: INTERNAL MEDICINE

## 2023-04-04 PROCEDURE — 63710000001 INSULIN GLARGINE PER 5 UNITS: Performed by: HOSPITALIST

## 2023-04-04 PROCEDURE — 99308 SBSQ NF CARE LOW MDM 20: CPT | Performed by: NURSE PRACTITIONER

## 2023-04-04 PROCEDURE — 63710000001 INSULIN LISPRO (HUMAN) PER 5 UNITS: Performed by: HOSPITALIST

## 2023-04-04 PROCEDURE — 82962 GLUCOSE BLOOD TEST: CPT

## 2023-04-04 RX ORDER — SPIRONOLACTONE 25 MG/1
25 TABLET ORAL DAILY
Qty: 30 TABLET | Refills: 0 | Status: SHIPPED | OUTPATIENT
Start: 2023-04-05 | End: 2023-05-05

## 2023-04-04 RX ORDER — INSULIN ASPART 100 [IU]/ML
25 INJECTION, SOLUTION INTRAVENOUS; SUBCUTANEOUS
Qty: 30 ML | Refills: 0 | Status: SHIPPED | OUTPATIENT
Start: 2023-04-04 | End: 2023-05-04

## 2023-04-04 RX ORDER — VERICIGUAT 2.5 MG/1
2.5 TABLET, FILM COATED ORAL DAILY
Qty: 30 TABLET | Refills: 1 | Status: SHIPPED | OUTPATIENT
Start: 2023-04-04 | End: 2023-05-04

## 2023-04-04 RX ORDER — CARVEDILOL 12.5 MG/1
12.5 TABLET ORAL 2 TIMES DAILY WITH MEALS
Qty: 60 TABLET | Refills: 0 | Status: SHIPPED | OUTPATIENT
Start: 2023-04-04 | End: 2023-05-04

## 2023-04-04 RX ORDER — INSULIN DETEMIR 100 [IU]/ML
40 INJECTION, SOLUTION SUBCUTANEOUS 2 TIMES DAILY
Qty: 75 ML | Refills: 0 | Status: ON HOLD | OUTPATIENT
Start: 2023-04-04 | End: 2023-04-09

## 2023-04-04 RX ORDER — INSULIN ASPART 100 [IU]/ML
25 INJECTION, SOLUTION INTRAVENOUS; SUBCUTANEOUS
Qty: 30 ML | Refills: 0 | Status: SHIPPED | OUTPATIENT
Start: 2023-04-04 | End: 2023-04-04 | Stop reason: SDUPTHER

## 2023-04-04 RX ADMIN — DULOXETINE HYDROCHLORIDE 60 MG: 60 CAPSULE, DELAYED RELEASE ORAL at 08:34

## 2023-04-04 RX ADMIN — BACLOFEN 30 MG: 10 TABLET ORAL at 12:41

## 2023-04-04 RX ADMIN — Medication 1 CAPSULE: at 08:34

## 2023-04-04 RX ADMIN — BACLOFEN 30 MG: 10 TABLET ORAL at 08:34

## 2023-04-04 RX ADMIN — INSULIN LISPRO 4 UNITS: 100 INJECTION, SOLUTION INTRAVENOUS; SUBCUTANEOUS at 12:41

## 2023-04-04 RX ADMIN — OXYCODONE HYDROCHLORIDE AND ACETAMINOPHEN 500 MG: 500 TABLET ORAL at 08:48

## 2023-04-04 RX ADMIN — INSULIN LISPRO 30 UNITS: 100 INJECTION, SOLUTION INTRAVENOUS; SUBCUTANEOUS at 17:17

## 2023-04-04 RX ADMIN — DICYCLOMINE HYDROCHLORIDE 10 MG: 10 CAPSULE ORAL at 08:31

## 2023-04-04 RX ADMIN — GABAPENTIN 800 MG: 400 CAPSULE ORAL at 08:31

## 2023-04-04 RX ADMIN — METFORMIN HYDROCHLORIDE 1000 MG: 500 TABLET ORAL at 08:33

## 2023-04-04 RX ADMIN — INSULIN LISPRO 30 UNITS: 100 INJECTION, SOLUTION INTRAVENOUS; SUBCUTANEOUS at 08:33

## 2023-04-04 RX ADMIN — MODAFINIL 200 MG: 100 TABLET ORAL at 08:33

## 2023-04-04 RX ADMIN — BUMETANIDE 2 MG: 1 TABLET ORAL at 08:34

## 2023-04-04 RX ADMIN — GABAPENTIN 800 MG: 400 CAPSULE ORAL at 15:23

## 2023-04-04 RX ADMIN — METFORMIN HYDROCHLORIDE 1000 MG: 500 TABLET ORAL at 17:16

## 2023-04-04 RX ADMIN — NYSTATIN: 100000 POWDER TOPICAL at 08:42

## 2023-04-04 RX ADMIN — PANTOPRAZOLE SODIUM 40 MG: 40 TABLET, DELAYED RELEASE ORAL at 08:33

## 2023-04-04 RX ADMIN — Medication 1 TABLET: at 08:31

## 2023-04-04 RX ADMIN — SUCRALFATE 1 G: 1 TABLET ORAL at 08:33

## 2023-04-04 RX ADMIN — INSULIN GLARGINE 75 UNITS: 100 INJECTION, SOLUTION SUBCUTANEOUS at 08:31

## 2023-04-04 RX ADMIN — APIXABAN 5 MG: 5 TABLET, FILM COATED ORAL at 08:34

## 2023-04-04 RX ADMIN — VITAMINS A AND D OINTMENT 1 APPLICATION: 15.5; 53.4 OINTMENT TOPICAL at 08:41

## 2023-04-04 RX ADMIN — Medication 10 ML: at 08:43

## 2023-04-04 RX ADMIN — INSULIN LISPRO 2 UNITS: 100 INJECTION, SOLUTION INTRAVENOUS; SUBCUTANEOUS at 17:17

## 2023-04-04 RX ADMIN — BACLOFEN 30 MG: 10 TABLET ORAL at 17:16

## 2023-04-04 RX ADMIN — INSULIN LISPRO 30 UNITS: 100 INJECTION, SOLUTION INTRAVENOUS; SUBCUTANEOUS at 12:41

## 2023-04-04 RX ADMIN — DAKIN'S SOLUTION 0.125% (QUARTER STRENGTH): 0.12 SOLUTION at 08:43

## 2023-04-04 RX ADMIN — EMPAGLIFLOZIN 10 MG: 10 TABLET, FILM COATED ORAL at 08:49

## 2023-04-04 RX ADMIN — SPIRONOLACTONE 25 MG: 25 TABLET ORAL at 08:35

## 2023-04-04 RX ADMIN — CARVEDILOL 12.5 MG: 6.25 TABLET, FILM COATED ORAL at 17:16

## 2023-04-04 RX ADMIN — Medication 1000 UNITS: at 08:34

## 2023-04-04 RX ADMIN — MAGNESIUM GLUCONATE 500 MG ORAL TABLET 1200 MG: 500 TABLET ORAL at 08:34

## 2023-04-04 RX ADMIN — FERROUS SULFATE TAB 325 MG (65 MG ELEMENTAL FE) 325 MG: 325 (65 FE) TAB at 08:49

## 2023-04-04 NOTE — DISCHARGE INSTR - APPOINTMENTS
You have a follow-up appointment with Franchesca BLANC on 4-13-23. This is a home visit and the office will call with the time of the visit. The office can be reached at [phone]

## 2023-04-04 NOTE — PROGRESS NOTES
PROGRESS NOTE         Patient Identification:  Name:  Ken Krueger  Age:  47 y.o.  Sex:  male  :  1975  MRN:  [id number]  Visit Number:  [id number]  Primary Care Provider:  Franchesca Hodges APRN         LOS: 42 days       ----------------------------------------------------------------------------------------------------------------------  Subjective       Chief Complaints:    No chief complaint on file.      Interval History:      Patient sitting up in bed this morning eating breakfast.  Currently on room air with no apparent distress.  Lungs clear to auscultation bilaterally.  Abdomen soft, nontender.  Afebrile, denies increase in ostomy output.  Wound overall healing well.  Plans for discharge home today.     Review of Systems:    Constitutional: No fever, chills. No night sweats. No appetite change or unexpected weight change. No fatigue.  Eyes: no eye drainage, itching or redness.  HEENT: no mouth sores, dysphagia or nose bleed.   Respiratory: no for shortness of breath.    Cardiovascular: no chest pain, no palpitations, no orthopnea.  Gastrointestinal: no nausea, vomiting or diarrhea. No abdominal pain, hematemesis or rectal bleeding.  Genitourinary: no dysuria or polyuria.  Hematologic/lymphatic: no lymph node abnormalities, no easy bruising or easy bleeding.  Musculoskeletal: no muscle or joint pain.  Skin: No rash and no itching.  Neurological: no loss of consciousness, no seizure, no headache.  Behavioral/Psych: no depression or suicidal ideation.  Endocrine: no hot flashes.  Immunologic: negative.    ----------------------------------------------------------------------------------------------------------------------      Objective       [facility] [place] Meds:    Pharmacy Consult - Pharmacy to dose,       ----------------------------------------------------------------------------------------------------------------------    Vital Signs:  Temp:  [97.8 °F (36.6 °C)-98.1 °F (36.7 °C)] 98.1  °F (36.7 °C)  Heart Rate:  [63-97] 97  Resp:  [18] 18  BP: (104-111)/(61-65) 111/63  Mean Arterial Pressure (Non-Invasive) for the past 24 hrs (Last 3 readings):   Noninvasive MAP (mmHg)   04/04/23 0830 77     SpO2 Percentage    04/01/23 0600 04/02/23 0638 04/04/23 0700   SpO2: 95% 94% 93%     SpO2:  [93 %] 93 %  on   ;   Device (Oxygen Therapy): room air    Body mass index is 42.34 kg/m².  Wt Readings from Last 3 Encounters:   02/21/23 (!) 138 kg (303 lb 9.2 oz)   02/20/23 (!) 139 kg (306 lb 14.1 oz)   12/13/22 (!) 141 kg (310 lb 14.4 oz)        Intake/Output Summary (Last 24 hours) at 4/4/2023 1244  Last data filed at 4/4/2023 0900  Gross per 24 hour   Intake 1640 ml   Output 4450 ml   Net -2810 ml     Diet: Diabetic Diets; Consistent Carbohydrate; Texture: Regular Texture (IDDSI 7); Fluid Consistency: Thin (IDDSI 0)  ----------------------------------------------------------------------------------------------------------------------      Physical Exam:    Constitutional: Morbidly obese  male is resting in bed on room air in no apparent distress.  Eating breakfast this morning.  HENT:  Head: Normocephalic and atraumatic.  Mouth:  Moist mucous membranes.    Eyes:  Conjunctivae and EOM are normal.  No scleral icterus.  Neck:  Neck supple.  No JVD present.    Cardiovascular:  Normal rate, regular rhythm and normal heart sounds with no murmur. No edema.  Pulmonary/Chest:  No respiratory distress, with no wheezing, rhonchi, or rales.  Clear to auscultation bilaterally.  Currently on room air.  Abdominal:  Morbidly obese.  Soft.  Bowel sounds are normal.  No distension and no tenderness.  Ileal conduit in place.  Colostomy bag in place with soft stools.  Musculoskeletal:  No tenderness.  No swelling or redness of joints.  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 PICC to right upper arm with no signs of infection.  Neurological:  Alert and oriented to person, place, and time.  No facial droop.  No slurred speech.   Skin:  Stage  IV sacral decubitus wound.  Dressing in place.   Psychiatric:  Normal mood and affect.  Behavior is normal.      ----------------------------------------------------------------------------------------------------------------------              Results from last 7 days   Lab Units 03/30/23  0159 03/29/23  0112   CRP mg/dL 8.11* 9.04*   WBC 10*3/mm3 9.98 13.24*   HEMOGLOBIN g/dL 10.2* 10.9*   HEMATOCRIT % 35.3* 37.6   MCV fL 88.5 87.9   MCHC g/dL 28.9* 29.0*   PLATELETS 10*3/mm3 335 396           Invalid input(s): PROTEstimated Creatinine Clearance: 116.8 mL/min (by C-G formula based on SCr of 1.13 mg/dL).  No results found for: AMMONIA    Glucose   Date/Time Value Ref Range Status   04/04/2023 1111 206 (H) 70 - 130 mg/dL Final     Comment:     Meter: [id number] : [id number] ASHLEY HOFFMANN   04/04/2023 0623 139 (H) 70 - 130 mg/dL Final     Comment:     Meter: [id number] : [id number] NINA ASIF   04/04/2023 0516 175 (H) 70 - 130 mg/dL Final     Comment:     Meter: [id number] : [id number] AZUCENA SHAFFER   04/03/2023 2046 258 (H) 70 - 130 mg/dL Final     Comment:     Meter: [id number] : [id number] AZUCENA SHAFFER   04/03/2023 1635 153 (H) 70 - 130 mg/dL Final     Comment:     Meter: [id number] : [id number] Matteo Estrella   04/03/2023 1057 234 (H) 70 - 130 mg/dL Final     Comment:     Meter: [id number] : [id number] Cierra Ruvalcabacomb   04/03/2023 0638 233 (H) 70 - 130 mg/dL Final     Comment:     Meter: [id number] : [id number] Cierra Ruvalc[place]omb   04/03/2023 0553 258 (H) 70 - 130 mg/dL Final     Comment:     Meter: [id number] : [id number] WILBER ELZA     Lab Results   Component Value Date    HGBA1C 11.50 (H) 02/13/2023     Lab Results   Component Value Date    TSH 3.780 07/19/2022    FREET4 1.31 06/02/2021       Blood Culture   Date Value Ref Range Status   02/12/2023 No growth at 24 hours  Preliminary   02/12/2023 No growth at 24 hours  Preliminary     No results found for: URINECX  No  results found for: WOUNDCX  No results found for: STOOLCX  No results found for: RESPCX  Pain Management Panel     Pain Management Panel Latest Ref Rng & Units 6/2/2021 8/19/2018    AMPHETAMINES SCREEN, URINE Negative Negative Negative    BARBITURATES SCREEN Negative Negative Negative    BENZODIAZEPINE SCREEN, URINE Negative Negative Negative    BUPRENORPHINEUR Negative Negative Negative    COCAINE SCREEN, URINE Negative Negative Negative    METHADONE SCREEN, URINE Negative Negative Negative            ----------------------------------------------------------------------------------------------------------------------  Imaging Results (Last 24 Hours)     ** No results found for the last 24 hours. **          -----------------------------------------------------------------------------------  Pertinent Infectious Disease Results     CT abdomen pelvis with contrast on 2/12/2023 showed there is a new decubitus ulcer just to the right of midline at the level of the coccyx with constellation of imaging findings most characteristic of acute infection/osteomyelitis. Small pocket of air and fluid adjacent to the coccyx measuring 3.7 x 4.1 cm could reflect phlegmon or abscess. Chronic bilateral decubitus ulcers at the level of the ischial tuberosities bilaterally with imaging findings suggestive of chronic infection/osteomyelitis, similar to prior. Hepatic steatosis and hepatomegaly with borderline splenomegaly. Nonobstructing left renal stones. Postoperative changes of left lower quadrant and colostomy and right mid abdominal ileal conduit formation. Diverticulosis. No diverticulitis or bowel obstruction. COVID-19 and flu A/B PCR on 2/12/2023 was negative. Wound culture from 3/17/2023 finalized as pan susceptible Enterococcus faecalis and pan susceptible Enterococcus avium. Respiratory panel on 3/22/2023 was negative.  Chest x-ray on 3/22/2023 was unremarkable.    Status post excisional debridement of sacral ulcer on  2/13/2023 with Dr. Taylor.          Assessment/Plan         ASSESSMENT:    Septic shock with lactic acid greater than 4 on admission  Acute and chronic decubitus ulcerations with osteomyelitis and possible abscess      PLAN:    Patient sitting up in bed this morning eating breakfast.  Currently on room air with no apparent distress.  Lungs clear to auscultation bilaterally.  Abdomen soft, nontender.  Afebrile, denies increase in ostomy output.  Wound overall healing well.  Plans for discharge home today.     Status post debridement with deep tissue culture with wound care APRN Cathie Handy on 3/17/2023.    Patient completed course of Unasyn on 4/3/2023.  Patient stable from ID standpoint.  Okay to discharge home from ID standpoint with follow-up with ID outpatient as well as wound care.      Code Status:   Code Status and Medical Interventions:   Ordered at: 02/21/23 1034     Code Status (Patient has no pulse and is not breathing):    CPR (Attempt to Resuscitate)     Medical Interventions (Patient has pulse or is breathing):    Full Support       GUANACO Flores  04/04/23  12:44 EDT
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
## 2023-04-04 NOTE — DISCHARGE PLACEMENT REQUEST
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 Sreedhar Youssef OT GO 1               Sreedhar Youssef OT  2/22/2023    Electronically signed by Sreedhar Youssef OT at 02/22/23 1110       ADL Documentation (most recent)    Flowsheet Row Most Recent Value   Transferring 4 - completely dependent   Toileting 1 - assistive equipment   Bathing 2 - assistive person   Dressing 2 - assistive person   Eating 0 - independent   Communication 0 - understands/communicates without difficulty   Swallowing 0 - swallows foods/liquids without difficulty   Equipment Currently Used at Home hospital bed, grab bar, lift device, wheelchair, motorized, wheelchair  [hospital bed, trapeze bar, patricio lift, wheelchair via [facility] and an electric wheelchair]          Discharge Summary    No notes of this type exist for this encounter.         Discharge Order (From admission, onward)     Start     Ordered    04/04/23 0855  Discharge patient  Once        Expected Discharge Date: 04/04/23    Expected Discharge Time: Morning    Discharge Disposition: Home or Self Care    Physician of Record for Attribution - Please select from Treatment Team: ALICE COOK [[id number]]    Review needed by CMO to determine Physician of Record: No       Question Answer Comment   Physician of Record for Attribution - Please select from Treatment Team ALICE COOK    Review needed by CMO to determine Physician of Record No        04/04/23 0859

## 2023-04-04 NOTE — DISCHARGE INSTRUCTIONS
Please take medications as prescribed. Please go to follow-up appointments as recommended. Please seek medical attention if you have worsening of any symptoms. Please continue wound care and frequent repositioning to keep pressure wounds from worsening. Continue ostomy care.     Please continue social distancing and isolation efforts as recommended by CDC and [facility] to help prevent spread of COVID-19.

## 2023-04-04 NOTE — PLAN OF CARE
Goal Outcome Evaluation:  Plan of Care Reviewed With: patient        Progress: no change       Pt has been resting in bed. Pt tolerated wound care. VSS. No complaints from pt at this time. Will continue with plan of care.

## 2023-04-04 NOTE — PLAN OF CARE
Goal Outcome Evaluation:  Plan of Care Reviewed With: patient           Outcome Evaluation: Patient resting in bed at this time. Wound care completed. No complaints this shift. Will continue with plan of care.
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
## 2023-04-05 NOTE — OUTREACH NOTE
Prep Survey    Flowsheet Row Responses   [facility] discharged from? [facility]   Is LACE score < 7 ? No   Eligibility Readm Mgmt   Discharge diagnosis Osteomyelitis   Does the patient have one of the following disease processes/diagnoses(primary or secondary)? Other   Does the patient have Home health ordered? Yes   What is the Home health agency?  [facility]   Is there a DME ordered? No   Medication alerts for this patient see AVS   Prep survey completed? Yes          Chely LIMA - Registered Nurse

## 2023-04-07 ENCOUNTER — READMISSION MANAGEMENT (OUTPATIENT)
Dept: CALL CENTER | Facility: HOSPITAL | Age: 48
End: 2023-04-07
Payer: MEDICARE

## 2023-04-07 NOTE — OUTREACH NOTE
"Medical Week 1 Survey    Flowsheet Row Responses   [facility] patient discharged from? [facility]   Does the patient have one of the following disease processes/diagnoses(primary or secondary)? Other   Week 1 attempt successful? Yes   Call start time 1358   Call end time 1401   Discharge diagnosis Osteomyelitis   Meds reviewed with patient/caregiver? Yes   Does the patient have all medications ordered at discharge? Yes   Is the patient taking all medications as directed (includes completed medication regime)? Yes   Comments regarding appointments Appt with ID is on 4/12/23   Does the patient have a primary care provider?  Yes   Does the patient have an appointment with their PCP within 7 days of discharge? Greater than 7 days   Comments regarding PCP 4/13/23   What is preventing the patient from scheduling follow up appointments within 7 days of discharge? --  [unsure]   Nursing Interventions Verified appointment date/time/provider   Has the patient kept scheduled appointments due by today? N/A   What is the Home health agency?  [facility]   Has home health visited the patient within 72 hours of discharge? Yes   Psychosocial issues? No   Did the patient receive a copy of their discharge instructions? Yes   Nursing interventions Reviewed instructions with patient   What is the patient's perception of their health status since discharge? Improving   Week 1 call completed? Yes   Is the patient interested in additional calls from an ambulatory ?  NOTE:  applies to high risk patients requiring additional follow-up. No   Revoked No further contact(revokes)-requires comment   Graduated/Revoked comments Pt stated, \"it ain't necessary\" when asked about another call          Sally CHAVEZ - Registered Nurse  "

## 2023-04-08 ENCOUNTER — APPOINTMENT (OUTPATIENT)
Dept: CT IMAGING | Facility: HOSPITAL | Age: 48
DRG: 698 | End: 2023-04-08
Payer: MEDICARE

## 2023-04-08 ENCOUNTER — HOSPITAL ENCOUNTER (INPATIENT)
Facility: HOSPITAL | Age: 48
LOS: 3 days | Discharge: HOME HEALTH | DRG: 698 | End: 2023-04-12
Attending: STUDENT IN AN ORGANIZED HEALTH CARE EDUCATION/TRAINING PROGRAM | Admitting: INTERNAL MEDICINE
Payer: MEDICARE

## 2023-04-08 ENCOUNTER — APPOINTMENT (OUTPATIENT)
Dept: GENERAL RADIOLOGY | Facility: HOSPITAL | Age: 48
DRG: 698 | End: 2023-04-08
Payer: MEDICARE

## 2023-04-08 DIAGNOSIS — E87.29 RESPIRATORY ACIDOSIS: ICD-10-CM

## 2023-04-08 DIAGNOSIS — M46.28 SACRAL OSTEOMYELITIS: ICD-10-CM

## 2023-04-08 DIAGNOSIS — M86.60 OTHER CHRONIC OSTEOMYELITIS, UNSPECIFIED SITE: ICD-10-CM

## 2023-04-08 DIAGNOSIS — G47.33 OBSTRUCTIVE SLEEP APNEA: ICD-10-CM

## 2023-04-08 DIAGNOSIS — J18.9 LINGULAR PNEUMONIA: ICD-10-CM

## 2023-04-08 DIAGNOSIS — R65.20 SEVERE SEPSIS: Primary | ICD-10-CM

## 2023-04-08 DIAGNOSIS — I50.22 CHRONIC HFREF (HEART FAILURE WITH REDUCED EJECTION FRACTION): ICD-10-CM

## 2023-04-08 DIAGNOSIS — K76.82 HEPATIC ENCEPHALOPATHY: ICD-10-CM

## 2023-04-08 DIAGNOSIS — A41.9 SEVERE SEPSIS: Primary | ICD-10-CM

## 2023-04-08 LAB
ALBUMIN SERPL-MCNC: 3.7 G/DL (ref 3.5–5.2)
ALBUMIN/GLOB SERPL: 0.6 G/DL
ALP SERPL-CCNC: 142 U/L (ref 39–117)
ALT SERPL W P-5'-P-CCNC: 38 U/L (ref 1–41)
AMMONIA BLD-SCNC: 106 UMOL/L (ref 16–60)
AMORPH URATE CRY URNS QL MICRO: ABNORMAL /HPF
ANION GAP SERPL CALCULATED.3IONS-SCNC: 18.8 MMOL/L (ref 5–15)
ANISOCYTOSIS BLD QL: NORMAL
AST SERPL-CCNC: 33 U/L (ref 1–40)
BACTERIA UR QL AUTO: ABNORMAL /HPF
BASOPHILS # BLD AUTO: 0.05 10*3/MM3 (ref 0–0.2)
BASOPHILS NFR BLD AUTO: 0.2 % (ref 0–1.5)
BILIRUB SERPL-MCNC: 0.4 MG/DL (ref 0–1.2)
BILIRUB UR QL STRIP: NEGATIVE
BUN SERPL-MCNC: 54 MG/DL (ref 6–20)
BUN/CREAT SERPL: 28.9 (ref 7–25)
CALCIUM SPEC-SCNC: 9.6 MG/DL (ref 8.6–10.5)
CHLORIDE SERPL-SCNC: 94 MMOL/L (ref 98–107)
CLARITY UR: ABNORMAL
CO2 SERPL-SCNC: 19.2 MMOL/L (ref 22–29)
COLOR UR: ABNORMAL
CREAT SERPL-MCNC: 1.87 MG/DL (ref 0.76–1.27)
CRP SERPL-MCNC: 17.02 MG/DL (ref 0–0.5)
D-LACTATE SERPL-SCNC: 3.1 MMOL/L (ref 0.5–2)
DEPRECATED RDW RBC AUTO: 47 FL (ref 37–54)
EGFRCR SERPLBLD CKD-EPI 2021: 44.6 ML/MIN/1.73
EOSINOPHIL # BLD AUTO: 0.08 10*3/MM3 (ref 0–0.4)
EOSINOPHIL NFR BLD AUTO: 0.4 % (ref 0.3–6.2)
ERYTHROCYTE [DISTWIDTH] IN BLOOD BY AUTOMATED COUNT: 15.5 % (ref 12.3–15.4)
ERYTHROCYTE [SEDIMENTATION RATE] IN BLOOD: >130 MM/HR (ref 0–15)
FLUAV RNA RESP QL NAA+PROBE: NOT DETECTED
FLUBV RNA RESP QL NAA+PROBE: NOT DETECTED
GLOBULIN UR ELPH-MCNC: 5.8 GM/DL
GLUCOSE SERPL-MCNC: 352 MG/DL (ref 65–99)
GLUCOSE UR STRIP-MCNC: NEGATIVE MG/DL
HCT VFR BLD AUTO: 45.6 % (ref 37.5–51)
HGB BLD-MCNC: 13.2 G/DL (ref 13–17.7)
HGB UR QL STRIP.AUTO: ABNORMAL
HOLD SPECIMEN: NORMAL
HOLD SPECIMEN: NORMAL
HYALINE CASTS UR QL AUTO: ABNORMAL /LPF
HYPOCHROMIA BLD QL: NORMAL
IMM GRANULOCYTES # BLD AUTO: 0.09 10*3/MM3 (ref 0–0.05)
IMM GRANULOCYTES NFR BLD AUTO: 0.4 % (ref 0–0.5)
KETONES UR QL STRIP: NEGATIVE
LEUKOCYTE ESTERASE UR QL STRIP.AUTO: ABNORMAL
LYMPHOCYTES # BLD AUTO: 1.64 10*3/MM3 (ref 0.7–3.1)
LYMPHOCYTES NFR BLD AUTO: 7.9 % (ref 19.6–45.3)
MAGNESIUM SERPL-MCNC: 2.4 MG/DL (ref 1.6–2.6)
MCH RBC QN AUTO: 24.4 PG (ref 26.6–33)
MCHC RBC AUTO-ENTMCNC: 28.9 G/DL (ref 31.5–35.7)
MCV RBC AUTO: 84.3 FL (ref 79–97)
MONOCYTES # BLD AUTO: 0.68 10*3/MM3 (ref 0.1–0.9)
MONOCYTES NFR BLD AUTO: 3.3 % (ref 5–12)
MUCOUS THREADS URNS QL MICRO: ABNORMAL /HPF
NEUTROPHILS NFR BLD AUTO: 18.2 10*3/MM3 (ref 1.7–7)
NEUTROPHILS NFR BLD AUTO: 87.8 % (ref 42.7–76)
NITRITE UR QL STRIP: NEGATIVE
NRBC BLD AUTO-RTO: 0 /100 WBC (ref 0–0.2)
PH UR STRIP.AUTO: 6 [PH] (ref 5–8)
PLAT MORPH BLD: NORMAL
PLATELET # BLD AUTO: 457 10*3/MM3 (ref 140–450)
PMV BLD AUTO: 9.6 FL (ref 6–12)
POTASSIUM SERPL-SCNC: 3.7 MMOL/L (ref 3.5–5.2)
PROCALCITONIN SERPL-MCNC: 0.45 NG/ML (ref 0–0.25)
PROT SERPL-MCNC: 9.5 G/DL (ref 6–8.5)
PROT UR QL STRIP: ABNORMAL
RBC # BLD AUTO: 5.41 10*6/MM3 (ref 4.14–5.8)
RBC # UR STRIP: ABNORMAL /HPF
REF LAB TEST METHOD: ABNORMAL
SARS-COV-2 RNA RESP QL NAA+PROBE: NOT DETECTED
SODIUM SERPL-SCNC: 132 MMOL/L (ref 136–145)
SP GR UR STRIP: 1.02 (ref 1–1.03)
SQUAMOUS #/AREA URNS HPF: ABNORMAL /HPF
UROBILINOGEN UR QL STRIP: ABNORMAL
WBC # UR STRIP: ABNORMAL /HPF
WBC NRBC COR # BLD: 20.74 10*3/MM3 (ref 3.4–10.8)
WHOLE BLOOD HOLD COAG: NORMAL
WHOLE BLOOD HOLD SPECIMEN: NORMAL

## 2023-04-08 PROCEDURE — 74177 CT ABD & PELVIS W/CONTRAST: CPT

## 2023-04-08 PROCEDURE — 86140 C-REACTIVE PROTEIN: CPT | Performed by: NURSE PRACTITIONER

## 2023-04-08 PROCEDURE — 85025 COMPLETE CBC W/AUTO DIFF WBC: CPT | Performed by: STUDENT IN AN ORGANIZED HEALTH CARE EDUCATION/TRAINING PROGRAM

## 2023-04-08 PROCEDURE — 25010000002 PIPERACILLIN SOD-TAZOBACTAM PER 1 G: Performed by: STUDENT IN AN ORGANIZED HEALTH CARE EDUCATION/TRAINING PROGRAM

## 2023-04-08 PROCEDURE — 99285 EMERGENCY DEPT VISIT HI MDM: CPT

## 2023-04-08 PROCEDURE — 84484 ASSAY OF TROPONIN QUANT: CPT | Performed by: STUDENT IN AN ORGANIZED HEALTH CARE EDUCATION/TRAINING PROGRAM

## 2023-04-08 PROCEDURE — 84145 PROCALCITONIN (PCT): CPT | Performed by: NURSE PRACTITIONER

## 2023-04-08 PROCEDURE — 87040 BLOOD CULTURE FOR BACTERIA: CPT | Performed by: STUDENT IN AN ORGANIZED HEALTH CARE EDUCATION/TRAINING PROGRAM

## 2023-04-08 PROCEDURE — 85652 RBC SED RATE AUTOMATED: CPT | Performed by: NURSE PRACTITIONER

## 2023-04-08 PROCEDURE — 83605 ASSAY OF LACTIC ACID: CPT | Performed by: STUDENT IN AN ORGANIZED HEALTH CARE EDUCATION/TRAINING PROGRAM

## 2023-04-08 PROCEDURE — 87086 URINE CULTURE/COLONY COUNT: CPT | Performed by: STUDENT IN AN ORGANIZED HEALTH CARE EDUCATION/TRAINING PROGRAM

## 2023-04-08 PROCEDURE — 81001 URINALYSIS AUTO W/SCOPE: CPT | Performed by: STUDENT IN AN ORGANIZED HEALTH CARE EDUCATION/TRAINING PROGRAM

## 2023-04-08 PROCEDURE — 71260 CT THORAX DX C+: CPT

## 2023-04-08 PROCEDURE — 85007 BL SMEAR W/DIFF WBC COUNT: CPT | Performed by: STUDENT IN AN ORGANIZED HEALTH CARE EDUCATION/TRAINING PROGRAM

## 2023-04-08 PROCEDURE — 82140 ASSAY OF AMMONIA: CPT | Performed by: STUDENT IN AN ORGANIZED HEALTH CARE EDUCATION/TRAINING PROGRAM

## 2023-04-08 PROCEDURE — 83735 ASSAY OF MAGNESIUM: CPT | Performed by: STUDENT IN AN ORGANIZED HEALTH CARE EDUCATION/TRAINING PROGRAM

## 2023-04-08 PROCEDURE — 80053 COMPREHEN METABOLIC PANEL: CPT | Performed by: STUDENT IN AN ORGANIZED HEALTH CARE EDUCATION/TRAINING PROGRAM

## 2023-04-08 PROCEDURE — 36415 COLL VENOUS BLD VENIPUNCTURE: CPT

## 2023-04-08 PROCEDURE — 25510000001 IOPAMIDOL 61 % SOLUTION

## 2023-04-08 PROCEDURE — 87636 SARSCOV2 & INF A&B AMP PRB: CPT | Performed by: NURSE PRACTITIONER

## 2023-04-08 PROCEDURE — 51702 INSERT TEMP BLADDER CATH: CPT

## 2023-04-08 PROCEDURE — 71045 X-RAY EXAM CHEST 1 VIEW: CPT

## 2023-04-08 RX ORDER — SODIUM CHLORIDE 0.9 % (FLUSH) 0.9 %
10 SYRINGE (ML) INJECTION AS NEEDED
Status: DISCONTINUED | OUTPATIENT
Start: 2023-04-08 | End: 2023-04-12 | Stop reason: HOSPADM

## 2023-04-08 RX ADMIN — SODIUM CHLORIDE 1500 ML: 9 INJECTION, SOLUTION INTRAVENOUS at 22:50

## 2023-04-08 RX ADMIN — PIPERACILLIN SODIUM AND TAZOBACTAM SODIUM 4.5 G: 4; .5 INJECTION, POWDER, LYOPHILIZED, FOR SOLUTION INTRAVENOUS at 23:20

## 2023-04-09 LAB
A-A DO2: 24.5 MMHG (ref 0–300)
A-A DO2: 47.4 MMHG (ref 0–300)
A-A DO2: 7.8 MMHG (ref 0–300)
AMMONIA BLD-SCNC: 87 UMOL/L (ref 16–60)
ANION GAP SERPL CALCULATED.3IONS-SCNC: 16.2 MMOL/L (ref 5–15)
ANISOCYTOSIS BLD QL: NORMAL
ARTERIAL PATENCY WRIST A: POSITIVE
ATMOSPHERIC PRESS: 732 MMHG
ATMOSPHERIC PRESS: 734 MMHG
ATMOSPHERIC PRESS: 735 MMHG
BACTERIA SPEC AEROBE CULT: NORMAL
BASE EXCESS BLDA CALC-SCNC: -3.7 MMOL/L (ref 0–2)
BASE EXCESS BLDA CALC-SCNC: -5.3 MMOL/L (ref 0–2)
BASE EXCESS BLDA CALC-SCNC: -7.9 MMOL/L (ref 0–2)
BASOPHILS # BLD AUTO: 0.03 10*3/MM3 (ref 0–0.2)
BASOPHILS NFR BLD AUTO: 0.2 % (ref 0–1.5)
BDY SITE: ABNORMAL
BUN SERPL-MCNC: 60 MG/DL (ref 6–20)
BUN/CREAT SERPL: 32.6 (ref 7–25)
CALCIUM SPEC-SCNC: 8.6 MG/DL (ref 8.6–10.5)
CHLORIDE SERPL-SCNC: 100 MMOL/L (ref 98–107)
CO2 BLDA-SCNC: 22.9 MMOL/L (ref 22–33)
CO2 BLDA-SCNC: 23 MMOL/L (ref 22–33)
CO2 BLDA-SCNC: 23.3 MMOL/L (ref 22–33)
CO2 SERPL-SCNC: 13.8 MMOL/L (ref 22–29)
COHGB MFR BLD: 1.5 % (ref 0–5)
COHGB MFR BLD: 1.7 % (ref 0–5)
COHGB MFR BLD: 1.7 % (ref 0–5)
CREAT SERPL-MCNC: 1.84 MG/DL (ref 0.76–1.27)
CREAT UR-MCNC: 79.5 MG/DL
D-LACTATE SERPL-SCNC: 1.6 MMOL/L (ref 0.5–2)
DEPRECATED RDW RBC AUTO: 49.9 FL (ref 37–54)
EGFRCR SERPLBLD CKD-EPI 2021: 45.5 ML/MIN/1.73
EOSINOPHIL # BLD AUTO: 0.02 10*3/MM3 (ref 0–0.4)
EOSINOPHIL NFR BLD AUTO: 0.1 % (ref 0.3–6.2)
EPAP: 6
EPAP: 6
ERYTHROCYTE [DISTWIDTH] IN BLOOD BY AUTOMATED COUNT: 15.5 % (ref 12.3–15.4)
GAS FLOW AIRWAY: 2 LPM
GEN 5 2HR TROPONIN T REFLEX: 69 NG/L
GLUCOSE BLDC GLUCOMTR-MCNC: 248 MG/DL (ref 70–130)
GLUCOSE BLDC GLUCOMTR-MCNC: 288 MG/DL (ref 70–130)
GLUCOSE BLDC GLUCOMTR-MCNC: 309 MG/DL (ref 70–130)
GLUCOSE BLDC GLUCOMTR-MCNC: 315 MG/DL (ref 70–130)
GLUCOSE BLDC GLUCOMTR-MCNC: 357 MG/DL (ref 70–130)
GLUCOSE BLDC GLUCOMTR-MCNC: 389 MG/DL (ref 70–130)
GLUCOSE SERPL-MCNC: 361 MG/DL (ref 65–99)
HCO3 BLDA-SCNC: 21.5 MMOL/L (ref 20–26)
HCO3 BLDA-SCNC: 21.5 MMOL/L (ref 20–26)
HCO3 BLDA-SCNC: 21.8 MMOL/L (ref 20–26)
HCT VFR BLD AUTO: 40.6 % (ref 37.5–51)
HCT VFR BLD CALC: 35.1 % (ref 38–51)
HCT VFR BLD CALC: 35.2 % (ref 38–51)
HCT VFR BLD CALC: 37.9 % (ref 38–51)
HGB BLD-MCNC: 11.5 G/DL (ref 13–17.7)
HGB BLDA-MCNC: 11.4 G/DL (ref 14–18)
HGB BLDA-MCNC: 11.5 G/DL (ref 14–18)
HGB BLDA-MCNC: 12.4 G/DL (ref 14–18)
HYPOCHROMIA BLD QL: NORMAL
IMM GRANULOCYTES # BLD AUTO: 0.06 10*3/MM3 (ref 0–0.05)
IMM GRANULOCYTES NFR BLD AUTO: 0.4 % (ref 0–0.5)
INHALED O2 CONCENTRATION: 21 %
INHALED O2 CONCENTRATION: 28 %
INHALED O2 CONCENTRATION: 28 %
IPAP: 22
IPAP: 22
LYMPHOCYTES # BLD AUTO: 0.98 10*3/MM3 (ref 0.7–3.1)
LYMPHOCYTES NFR BLD AUTO: 6.7 % (ref 19.6–45.3)
Lab: ABNORMAL
MAGNESIUM SERPL-MCNC: 2.8 MG/DL (ref 1.6–2.6)
MCH RBC QN AUTO: 24.9 PG (ref 26.6–33)
MCHC RBC AUTO-ENTMCNC: 28.3 G/DL (ref 31.5–35.7)
MCV RBC AUTO: 87.9 FL (ref 79–97)
METHGB BLD QL: 0 % (ref 0–3)
METHGB BLD QL: 0 % (ref 0–3)
METHGB BLD QL: 0.4 % (ref 0–3)
MODALITY: ABNORMAL
MONOCYTES # BLD AUTO: 0.56 10*3/MM3 (ref 0.1–0.9)
MONOCYTES NFR BLD AUTO: 3.8 % (ref 5–12)
MRSA DNA SPEC QL NAA+PROBE: NORMAL
NEUTROPHILS NFR BLD AUTO: 12.91 10*3/MM3 (ref 1.7–7)
NEUTROPHILS NFR BLD AUTO: 88.8 % (ref 42.7–76)
NOTE: ABNORMAL
NOTIFIED BY: ABNORMAL
NOTIFIED WHO: ABNORMAL
NOTIFIED WHO: ABNORMAL
NRBC BLD AUTO-RTO: 0 /100 WBC (ref 0–0.2)
OXYHGB MFR BLDV: 88.7 % (ref 94–99)
OXYHGB MFR BLDV: 95.7 % (ref 94–99)
OXYHGB MFR BLDV: 96.6 % (ref 94–99)
PCO2 BLDA: 40.2 MM HG (ref 35–45)
PCO2 BLDA: 46 MM HG (ref 35–45)
PCO2 BLDA: 60.8 MM HG (ref 35–45)
PCO2 TEMP ADJ BLD: ABNORMAL MM[HG]
PH BLDA: 7.16 PH UNITS (ref 7.35–7.45)
PH BLDA: 7.28 PH UNITS (ref 7.35–7.45)
PH BLDA: 7.34 PH UNITS (ref 7.35–7.45)
PH, TEMP CORRECTED: ABNORMAL
PHOSPHATE SERPL-MCNC: 6.5 MG/DL (ref 2.5–4.5)
PLAT MORPH BLD: NORMAL
PLATELET # BLD AUTO: 290 10*3/MM3 (ref 140–450)
PMV BLD AUTO: 10.3 FL (ref 6–12)
PO2 BLDA: 116 MM HG (ref 83–108)
PO2 BLDA: 76.4 MM HG (ref 83–108)
PO2 BLDA: 91.3 MM HG (ref 83–108)
PO2 TEMP ADJ BLD: ABNORMAL MM[HG]
POTASSIUM SERPL-SCNC: 4.5 MMOL/L (ref 3.5–5.2)
QT INTERVAL: 436 MS
QTC INTERVAL: 539 MS
RBC # BLD AUTO: 4.62 10*6/MM3 (ref 4.14–5.8)
SAO2 % BLDCOA: 90 % (ref 94–99)
SAO2 % BLDCOA: 97.3 % (ref 94–99)
SAO2 % BLDCOA: 98.7 % (ref 94–99)
SET MECH RESP RATE: 24
SET MECH RESP RATE: 24
SODIUM SERPL-SCNC: 130 MMOL/L (ref 136–145)
SODIUM UR-SCNC: 34 MMOL/L
TROPONIN T DELTA: -5 NG/L
TROPONIN T SERPL HS-MCNC: 74 NG/L
VANCOMYCIN TROUGH SERPL-MCNC: 8.6 MCG/ML (ref 5–20)
VENTILATOR MODE: ABNORMAL
WBC NRBC COR # BLD: 14.56 10*3/MM3 (ref 3.4–10.8)

## 2023-04-09 PROCEDURE — 84484 ASSAY OF TROPONIN QUANT: CPT | Performed by: STUDENT IN AN ORGANIZED HEALTH CARE EDUCATION/TRAINING PROGRAM

## 2023-04-09 PROCEDURE — 82375 ASSAY CARBOXYHB QUANT: CPT

## 2023-04-09 PROCEDURE — 25010000002 HEPARIN (PORCINE) PER 1000 UNITS: Performed by: INTERNAL MEDICINE

## 2023-04-09 PROCEDURE — 83735 ASSAY OF MAGNESIUM: CPT | Performed by: NURSE PRACTITIONER

## 2023-04-09 PROCEDURE — 80202 ASSAY OF VANCOMYCIN: CPT

## 2023-04-09 PROCEDURE — 82962 GLUCOSE BLOOD TEST: CPT

## 2023-04-09 PROCEDURE — 80048 BASIC METABOLIC PNL TOTAL CA: CPT | Performed by: NURSE PRACTITIONER

## 2023-04-09 PROCEDURE — 82805 BLOOD GASES W/O2 SATURATION: CPT

## 2023-04-09 PROCEDURE — 94799 UNLISTED PULMONARY SVC/PX: CPT

## 2023-04-09 PROCEDURE — 82570 ASSAY OF URINE CREATININE: CPT | Performed by: INTERNAL MEDICINE

## 2023-04-09 PROCEDURE — 63710000001 INSULIN LISPRO (HUMAN) PER 5 UNITS: Performed by: INTERNAL MEDICINE

## 2023-04-09 PROCEDURE — 85007 BL SMEAR W/DIFF WBC COUNT: CPT | Performed by: NURSE PRACTITIONER

## 2023-04-09 PROCEDURE — 94761 N-INVAS EAR/PLS OXIMETRY MLT: CPT

## 2023-04-09 PROCEDURE — 99223 1ST HOSP IP/OBS HIGH 75: CPT | Performed by: INTERNAL MEDICINE

## 2023-04-09 PROCEDURE — 83050 HGB METHEMOGLOBIN QUAN: CPT

## 2023-04-09 PROCEDURE — 82140 ASSAY OF AMMONIA: CPT | Performed by: NURSE PRACTITIONER

## 2023-04-09 PROCEDURE — 87641 MR-STAPH DNA AMP PROBE: CPT | Performed by: INTERNAL MEDICINE

## 2023-04-09 PROCEDURE — 63710000001 INSULIN GLARGINE PER 5 UNITS: Performed by: INTERNAL MEDICINE

## 2023-04-09 PROCEDURE — 25010000002 VANCOMYCIN 5 G RECONSTITUTED SOLUTION: Performed by: INTERNAL MEDICINE

## 2023-04-09 PROCEDURE — 84100 ASSAY OF PHOSPHORUS: CPT | Performed by: NURSE PRACTITIONER

## 2023-04-09 PROCEDURE — 36600 WITHDRAWAL OF ARTERIAL BLOOD: CPT

## 2023-04-09 PROCEDURE — 25010000002 VANCOMYCIN 1 G RECONSTITUTED SOLUTION 1 EACH VIAL: Performed by: STUDENT IN AN ORGANIZED HEALTH CARE EDUCATION/TRAINING PROGRAM

## 2023-04-09 PROCEDURE — 93005 ELECTROCARDIOGRAM TRACING: CPT | Performed by: INTERNAL MEDICINE

## 2023-04-09 PROCEDURE — 94660 CPAP INITIATION&MGMT: CPT

## 2023-04-09 PROCEDURE — 85025 COMPLETE CBC W/AUTO DIFF WBC: CPT | Performed by: NURSE PRACTITIONER

## 2023-04-09 PROCEDURE — 25010000002 MEROPENEM PER 100 MG: Performed by: INTERNAL MEDICINE

## 2023-04-09 PROCEDURE — 82010 KETONE BODYS QUAN: CPT | Performed by: NURSE PRACTITIONER

## 2023-04-09 PROCEDURE — 84300 ASSAY OF URINE SODIUM: CPT | Performed by: INTERNAL MEDICINE

## 2023-04-09 PROCEDURE — 83605 ASSAY OF LACTIC ACID: CPT | Performed by: STUDENT IN AN ORGANIZED HEALTH CARE EDUCATION/TRAINING PROGRAM

## 2023-04-09 RX ORDER — GUAIFENESIN 600 MG/1
600 TABLET, EXTENDED RELEASE ORAL EVERY 12 HOURS SCHEDULED
Status: DISCONTINUED | OUTPATIENT
Start: 2023-04-09 | End: 2023-04-10

## 2023-04-09 RX ORDER — ATORVASTATIN CALCIUM 10 MG/1
10 TABLET, FILM COATED ORAL NIGHTLY
Status: DISCONTINUED | OUTPATIENT
Start: 2023-04-09 | End: 2023-04-12 | Stop reason: HOSPADM

## 2023-04-09 RX ORDER — SODIUM CHLORIDE 9 MG/ML
40 INJECTION, SOLUTION INTRAVENOUS AS NEEDED
Status: DISCONTINUED | OUTPATIENT
Start: 2023-04-09 | End: 2023-04-12 | Stop reason: HOSPADM

## 2023-04-09 RX ORDER — BACLOFEN 10 MG/1
30 TABLET ORAL
Status: DISCONTINUED | OUTPATIENT
Start: 2023-04-09 | End: 2023-04-12 | Stop reason: HOSPADM

## 2023-04-09 RX ORDER — SUCRALFATE 1 G/1
1 TABLET ORAL DAILY
Status: DISCONTINUED | OUTPATIENT
Start: 2023-04-10 | End: 2023-04-12 | Stop reason: HOSPADM

## 2023-04-09 RX ORDER — LACTULOSE 10 G/15ML
300 SOLUTION ORAL ONCE
Status: DISCONTINUED | OUTPATIENT
Start: 2023-04-09 | End: 2023-04-10

## 2023-04-09 RX ORDER — IPRATROPIUM BROMIDE AND ALBUTEROL SULFATE 2.5; .5 MG/3ML; MG/3ML
3 SOLUTION RESPIRATORY (INHALATION) EVERY 4 HOURS PRN
Status: DISCONTINUED | OUTPATIENT
Start: 2023-04-09 | End: 2023-04-12 | Stop reason: HOSPADM

## 2023-04-09 RX ORDER — SPIRONOLACTONE 25 MG/1
25 TABLET ORAL DAILY
Status: CANCELLED | OUTPATIENT
Start: 2023-04-09

## 2023-04-09 RX ORDER — METOLAZONE 2.5 MG/1
2.5 TABLET ORAL 3 TIMES WEEKLY
Status: CANCELLED | OUTPATIENT
Start: 2023-04-10

## 2023-04-09 RX ORDER — ARIPIPRAZOLE 10 MG/1
10 TABLET ORAL NIGHTLY
Status: DISCONTINUED | OUTPATIENT
Start: 2023-04-09 | End: 2023-04-12 | Stop reason: HOSPADM

## 2023-04-09 RX ORDER — SODIUM CHLORIDE 9 MG/ML
75 INJECTION, SOLUTION INTRAVENOUS CONTINUOUS
Status: ACTIVE | OUTPATIENT
Start: 2023-04-09 | End: 2023-04-09

## 2023-04-09 RX ORDER — LACTULOSE 10 G/15ML
10 SOLUTION ORAL 3 TIMES DAILY
Status: DISCONTINUED | OUTPATIENT
Start: 2023-04-09 | End: 2023-04-12 | Stop reason: HOSPADM

## 2023-04-09 RX ORDER — GABAPENTIN 400 MG/1
800 CAPSULE ORAL EVERY 8 HOURS SCHEDULED
Status: DISCONTINUED | OUTPATIENT
Start: 2023-04-09 | End: 2023-04-12 | Stop reason: HOSPADM

## 2023-04-09 RX ORDER — FERROUS SULFATE 325(65) MG
325 TABLET ORAL 2 TIMES DAILY
Status: DISCONTINUED | OUTPATIENT
Start: 2023-04-09 | End: 2023-04-12 | Stop reason: HOSPADM

## 2023-04-09 RX ORDER — DICYCLOMINE HYDROCHLORIDE 10 MG/1
10 CAPSULE ORAL 2 TIMES DAILY
Status: DISCONTINUED | OUTPATIENT
Start: 2023-04-09 | End: 2023-04-12 | Stop reason: HOSPADM

## 2023-04-09 RX ORDER — PANTOPRAZOLE SODIUM 40 MG/10ML
40 INJECTION, POWDER, LYOPHILIZED, FOR SOLUTION INTRAVENOUS
Status: DISCONTINUED | OUTPATIENT
Start: 2023-04-09 | End: 2023-04-10

## 2023-04-09 RX ORDER — BUMETANIDE 1 MG/1
2 TABLET ORAL 2 TIMES DAILY
Status: CANCELLED | OUTPATIENT
Start: 2023-04-09

## 2023-04-09 RX ORDER — DEXTROSE MONOHYDRATE 25 G/50ML
25 INJECTION, SOLUTION INTRAVENOUS
Status: DISCONTINUED | OUTPATIENT
Start: 2023-04-09 | End: 2023-04-09

## 2023-04-09 RX ORDER — NICOTINE POLACRILEX 4 MG
15 LOZENGE BUCCAL
Status: DISCONTINUED | OUTPATIENT
Start: 2023-04-09 | End: 2023-04-10

## 2023-04-09 RX ORDER — GARLIC EXTRACT 500 MG
1 CAPSULE ORAL DAILY
Status: DISCONTINUED | OUTPATIENT
Start: 2023-04-10 | End: 2023-04-12 | Stop reason: HOSPADM

## 2023-04-09 RX ORDER — SODIUM CHLORIDE 0.9 % (FLUSH) 0.9 %
10 SYRINGE (ML) INJECTION AS NEEDED
Status: DISCONTINUED | OUTPATIENT
Start: 2023-04-09 | End: 2023-04-12 | Stop reason: HOSPADM

## 2023-04-09 RX ORDER — METHENAMINE HIPPURATE 1000 MG/1
1 TABLET ORAL 2 TIMES DAILY WITH MEALS
Status: DISCONTINUED | OUTPATIENT
Start: 2023-04-10 | End: 2023-04-12 | Stop reason: HOSPADM

## 2023-04-09 RX ORDER — INSULIN LISPRO 100 [IU]/ML
2-7 INJECTION, SOLUTION INTRAVENOUS; SUBCUTANEOUS EVERY 6 HOURS SCHEDULED
Status: DISCONTINUED | OUTPATIENT
Start: 2023-04-09 | End: 2023-04-09

## 2023-04-09 RX ORDER — SODIUM CHLORIDE 0.9 % (FLUSH) 0.9 %
10 SYRINGE (ML) INJECTION EVERY 12 HOURS SCHEDULED
Status: DISCONTINUED | OUTPATIENT
Start: 2023-04-09 | End: 2023-04-12 | Stop reason: HOSPADM

## 2023-04-09 RX ORDER — ASCORBIC ACID 500 MG
500 TABLET ORAL DAILY
Status: DISCONTINUED | OUTPATIENT
Start: 2023-04-10 | End: 2023-04-12 | Stop reason: HOSPADM

## 2023-04-09 RX ORDER — INSULIN LISPRO 100 [IU]/ML
1-200 INJECTION, SOLUTION INTRAVENOUS; SUBCUTANEOUS EVERY 6 HOURS SCHEDULED
Status: DISCONTINUED | OUTPATIENT
Start: 2023-04-09 | End: 2023-04-10

## 2023-04-09 RX ORDER — CARVEDILOL 6.25 MG/1
12.5 TABLET ORAL 2 TIMES DAILY WITH MEALS
Status: DISCONTINUED | OUTPATIENT
Start: 2023-04-09 | End: 2023-04-12 | Stop reason: HOSPADM

## 2023-04-09 RX ORDER — OMEGA-3S/DHA/EPA/FISH OIL/D3 300MG-1000
1000 CAPSULE ORAL DAILY
Status: DISCONTINUED | OUTPATIENT
Start: 2023-04-10 | End: 2023-04-12 | Stop reason: HOSPADM

## 2023-04-09 RX ORDER — PANTOPRAZOLE SODIUM 40 MG/1
40 TABLET, DELAYED RELEASE ORAL
Status: CANCELLED | OUTPATIENT
Start: 2023-04-10

## 2023-04-09 RX ORDER — DULOXETIN HYDROCHLORIDE 60 MG/1
60 CAPSULE, DELAYED RELEASE ORAL 2 TIMES DAILY
Status: DISCONTINUED | OUTPATIENT
Start: 2023-04-09 | End: 2023-04-12 | Stop reason: HOSPADM

## 2023-04-09 RX ORDER — HEPARIN SODIUM 5000 [USP'U]/ML
5000 INJECTION, SOLUTION INTRAVENOUS; SUBCUTANEOUS EVERY 12 HOURS SCHEDULED
Status: DISCONTINUED | OUTPATIENT
Start: 2023-04-09 | End: 2023-04-09

## 2023-04-09 RX ORDER — NICOTINE POLACRILEX 4 MG
15 LOZENGE BUCCAL
Status: DISCONTINUED | OUTPATIENT
Start: 2023-04-09 | End: 2023-04-09

## 2023-04-09 RX ORDER — DEXTROSE MONOHYDRATE 25 G/50ML
10-50 INJECTION, SOLUTION INTRAVENOUS
Status: DISCONTINUED | OUTPATIENT
Start: 2023-04-09 | End: 2023-04-12 | Stop reason: HOSPADM

## 2023-04-09 RX ORDER — LACTULOSE 10 G/15ML
300 SOLUTION ORAL ONCE
Status: DISCONTINUED | OUTPATIENT
Start: 2023-04-09 | End: 2023-04-09

## 2023-04-09 RX ORDER — INSULIN DETEMIR 100 [IU]/ML
50 INJECTION, SOLUTION SUBCUTANEOUS 2 TIMES DAILY
COMMUNITY

## 2023-04-09 RX ORDER — INSULIN LISPRO 100 [IU]/ML
1-200 INJECTION, SOLUTION INTRAVENOUS; SUBCUTANEOUS AS NEEDED
Status: DISCONTINUED | OUTPATIENT
Start: 2023-04-09 | End: 2023-04-10 | Stop reason: SDUPTHER

## 2023-04-09 RX ORDER — MODAFINIL 100 MG/1
200 TABLET ORAL DAILY
Status: DISCONTINUED | OUTPATIENT
Start: 2023-04-10 | End: 2023-04-12 | Stop reason: HOSPADM

## 2023-04-09 RX ORDER — MULTIPLE VITAMINS W/ MINERALS TAB 9MG-400MCG
1 TAB ORAL DAILY
Status: DISCONTINUED | OUTPATIENT
Start: 2023-04-10 | End: 2023-04-12 | Stop reason: HOSPADM

## 2023-04-09 RX ADMIN — INSULIN GLARGINE 36 UNITS: 100 INJECTION, SOLUTION SUBCUTANEOUS at 21:16

## 2023-04-09 RX ADMIN — SODIUM CHLORIDE 75 ML/HR: 9 INJECTION, SOLUTION INTRAVENOUS at 08:49

## 2023-04-09 RX ADMIN — INSULIN LISPRO 5 UNITS: 100 INJECTION, SOLUTION INTRAVENOUS; SUBCUTANEOUS at 17:18

## 2023-04-09 RX ADMIN — LACTULOSE 10 G: 20 SOLUTION ORAL at 21:07

## 2023-04-09 RX ADMIN — DICYCLOMINE HYDROCHLORIDE 10 MG: 10 CAPSULE ORAL at 21:08

## 2023-04-09 RX ADMIN — CARVEDILOL 12.5 MG: 6.25 TABLET, FILM COATED ORAL at 21:08

## 2023-04-09 RX ADMIN — IPRATROPIUM BROMIDE AND ALBUTEROL SULFATE 3 ML: .5; 2.5 SOLUTION RESPIRATORY (INHALATION) at 18:29

## 2023-04-09 RX ADMIN — VANCOMYCIN HYDROCHLORIDE 1 G: 1 INJECTION, POWDER, LYOPHILIZED, FOR SOLUTION INTRAVENOUS at 00:11

## 2023-04-09 RX ADMIN — VANCOMYCIN HYDROCHLORIDE 750 MG: 5 INJECTION, POWDER, LYOPHILIZED, FOR SOLUTION INTRAVENOUS at 11:26

## 2023-04-09 RX ADMIN — APIXABAN 5 MG: 5 TABLET, FILM COATED ORAL at 21:08

## 2023-04-09 RX ADMIN — DULOXETINE HYDROCHLORIDE 60 MG: 60 CAPSULE, DELAYED RELEASE ORAL at 21:08

## 2023-04-09 RX ADMIN — MEROPENEM 1 G: 1 INJECTION, POWDER, FOR SOLUTION INTRAVENOUS at 05:57

## 2023-04-09 RX ADMIN — HEPARIN SODIUM 5000 UNITS: 5000 INJECTION INTRAVENOUS; SUBCUTANEOUS at 08:50

## 2023-04-09 RX ADMIN — BACLOFEN 30 MG: 10 TABLET ORAL at 21:09

## 2023-04-09 RX ADMIN — GUAIFENESIN 600 MG: 600 TABLET, EXTENDED RELEASE ORAL at 21:09

## 2023-04-09 RX ADMIN — ATORVASTATIN CALCIUM 10 MG: 10 TABLET, FILM COATED ORAL at 21:09

## 2023-04-09 RX ADMIN — INSULIN LISPRO 6 UNITS: 100 INJECTION, SOLUTION INTRAVENOUS; SUBCUTANEOUS at 06:10

## 2023-04-09 RX ADMIN — MEROPENEM 1 G: 1 INJECTION, POWDER, FOR SOLUTION INTRAVENOUS at 21:09

## 2023-04-09 RX ADMIN — Medication 10 ML: at 08:50

## 2023-04-09 RX ADMIN — PANTOPRAZOLE SODIUM 40 MG: 40 INJECTION, POWDER, FOR SOLUTION INTRAVENOUS at 05:57

## 2023-04-09 RX ADMIN — LACTULOSE 10 G: 20 SOLUTION ORAL at 16:28

## 2023-04-09 RX ADMIN — INSULIN LISPRO 8 UNITS: 100 INJECTION, SOLUTION INTRAVENOUS; SUBCUTANEOUS at 08:44

## 2023-04-09 RX ADMIN — MAGNESIUM GLUCONATE 500 MG ORAL TABLET 1200 MG: 500 TABLET ORAL at 21:09

## 2023-04-09 RX ADMIN — Medication 10 ML: at 21:09

## 2023-04-09 RX ADMIN — IPRATROPIUM BROMIDE AND ALBUTEROL SULFATE 3 ML: .5; 2.5 SOLUTION RESPIRATORY (INHALATION) at 07:08

## 2023-04-09 RX ADMIN — MEROPENEM 1 G: 1 INJECTION, POWDER, FOR SOLUTION INTRAVENOUS at 14:09

## 2023-04-09 RX ADMIN — INSULIN LISPRO 4 UNITS: 100 INJECTION, SOLUTION INTRAVENOUS; SUBCUTANEOUS at 12:19

## 2023-04-09 RX ADMIN — GABAPENTIN 800 MG: 400 CAPSULE ORAL at 21:08

## 2023-04-09 RX ADMIN — FERROUS SULFATE TAB 325 MG (65 MG ELEMENTAL FE) 325 MG: 325 (65 FE) TAB at 21:08

## 2023-04-09 RX ADMIN — ARIPIPRAZOLE 10 MG: 10 TABLET ORAL at 21:08

## 2023-04-09 RX ADMIN — LACTULOSE 10 G: 20 SOLUTION ORAL at 12:18

## 2023-04-09 NOTE — PROGRESS NOTES
Pharmacy to dose Vancomycin for sepsis - Dosed as follows: Vancomycin 1000mg iv x 1 then 750mg iv q12h.  Pharmacy will monitor and adjust dosing to maintain AUC of 400-600.     Erick Stack RPH  05:27 EDT  04/09/23

## 2023-04-09 NOTE — PAYOR COMM NOTE
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         04/08/23 2041 04/08/23 2040  Comprehensive Metabolic Panel  Once         04/08/23 2041 04/08/23 2040  Lactic Acid, Plasma  Once         04/08/23 2041 04/08/23 2040  Norton Draw  Once         04/08/23 2041 04/08/23 2040  Blood Culture - Blood, Arm, Right  Once         04/08/23 2041 04/08/23 2040  Blood Culture - Blood, Hand, Left  Once         04/08/23 2041 04/08/23 2040  CBC Auto Differential  PROCEDURE ONCE         04/08/23 2041 04/08/23 2040  Green Top (Gel)  PROCEDURE ONCE         04/08/23 2041 04/08/23 2040  Lavender Top  PROCEDURE ONCE         04/08/23 2041 04/08/23 2040  Gold Top - SST  PROCEDURE ONCE         04/08/23 2041 04/08/23 2040  Light Blue Top  PROCEDURE ONCE         04/08/23 2041 04/08/23 2039  sodium chloride 0.9 % flush 10 mL  As Needed         04/08/23 2041    Unscheduled  Oxygen Therapy- Nasal Cannula; 2 LPM; Titrate for SPO2: 92%, Greater Than or Equal To  Continuous PRN       04/08/23 2041     Unscheduled  Follow Hypoglycemia Standing Orders For Blood Glucose <70 & Notify Provider of Treatment  As Needed      Comments: Follow Hypoglycemia Orders As Outlined in Process Instructions (Open Order Report to View Full Instructions)  Notify Provider Any Time Hypoglycemia Treatment is Administered    04/09/23 0417                   Physician Progress Notes (last 24 hours)      Progress Notes signed by Mirza Lombardo MD at 04/09/23 0618         Nurse Practitioner - Brief Progress Note  PERMANENT  04/09/2023 04:33    [facility] - [facility] [place], [place] ([facility])    GLADIS DENG    Date of Service 04/09/2023 04:33    HPI/Events of Note [facility] Provider Assessment Note    47-year-old male with PMH significant for paraplegia, morbid obesity, ARLIN on CPAP, N ICM, left AKA, s/p ileal conduit and colostomy, CHF, HLD, HTN, sleep apnea, skin CA R UE, asthma, arthritis, recent admit for stage IV sacral decubitus ulcer with   osteomyelitis with cultures positive E coli with recent discharge on 04/04, presented to the ED for progressive lethargy and confusion x2 days, completed Rocephin on to stay with PICC removal.  Admitted for acute on chronic hypercapnic respiratory   failure likely 2/2 AMS, sepsis likely 2/2 HCAP and/or UTI and/or infected pressure ulcer, acute encephalopathy may be metabolic vs hepatic due to hyperammonemia vs elevated CO2, JAKE with high anion gap likely 2/2 lactic acidosis.    Assessment and Plan:    1. Acute on chronic hypercapnic/hypoxemic respiratory failure likely 2/2 AMS:  BiPAP, ABG, CXR read noted, nebs  2. Sepsis may be multifactorial 2/2 HCAP and/or UTI and/or infected decubitus ulcer:  Fluid bolus 1.5 L due to recent EF 21-25%, cultures, empiric vancomycin and Merrem, trend lactic, procal, recommend ID consult, CT chest/abdomen/pelvis read noted  3. Acute encephalopathy may be metabolic vs hepatic with hyperammonemia vs elevated CO2:  Ammonia level, lactulose  enemas x2, recommend to evaluate for additional doses pending repeat ammonia level and evaluate if he is able to take p.o. at that time,   monitor neuro status  4. JAKE likely prerenal 2/2 sepsis with high anion gap likely secondary metabolic acidosis:  Monitor and correct electrolytes as needed, monitor renal function, avoid nephrotoxins, avoid fluid overload due to history of significant CHF  5. Elevated troponin may be 2/2 sepsis vs MI vs JAKE vs demand ischemia:  Serial troponin, EKG read by me pending final read sinus rhythm with no obvious ST elevation, possible bundle branch block, , avoid QTC prolonging agents, check magnesium   level  6. Hyperglycemia: Check beta hydroxy, on insulin sliding scale, repeat glucose and if still elevated recommend insulin gtt     __x___   Video Assessment performed  __x___   Most recent labs reviewed  __x___   Vital Signs reviewed 88/47 (75), HR 97, R 27, 99% on BiPAP 22/6, backup rate 24, 21%  __x___   Best Practices addressed:                 VTE prophylaxis:  Heparin                 SUP (when indicated):  Protonix                 Current Glucose:  352, on insulin sliding scale                      Please notify bedside physician when present or Feesheh if glc > 180 X 2                 Sepsis guidelines:  No, avoid fluid overload due to EF                 Lung protective strategy: Not applicable                 Targeted Temperature Management:  Not applicable    _____     Spoke with bedside RN - spoke with unit RN as bedside RN unavailable, no needs, aware of orders  __x___     Orders written    Dictated using voice recognition technology.  Contact Feesheh for any needs if bedside physician is not present.       The patient is critically ill with respiratory failure; Critical care; 40 minutes total evaluation time         Interventions Major-Change in mental status - evaluation and management, Infection - evaluation and management, Respiratory failure -  evaluation and management, Sepsis - evaluation and management  Intermediate-Best-practice therapies (e.g. VTE, beta blocker, etc.), Communication with other healthcare providers and/or family, Diagnostic test evaluation, Medication change / dose adjustment  Minor-Clinical assessment - ordering diagnostic tests        Electronically Signed by: Deandra Liu (NP) on 04/09/2023 05:12    Annotated By: Mirza Lombardo)    Date: 04/09/23 06:18  Agree with assessment and plan as outlined above    Electronically signed by Mirza Lombardo MD at 04/09/23 0618       Consult Notes (last 24 hours)  Notes from 04/08/23 0643 through 04/09/23 0643   No notes of this type exist for this encounter.

## 2023-04-09 NOTE — PLAN OF CARE
Problem: Skin Injury Risk Increased  Goal: Skin Health and Integrity  Intervention: Optimize Skin Protection  Recent Flowsheet Documentation  Taken 4/9/2023 0600 by Dee Dee Gonzalez RN  Head of Bed (HOB) Positioning: HOB elevated  Taken 4/9/2023 0400 by Dee Dee Gonzalez RN  Head of Bed (HOB) Positioning: HOB elevated     Problem: Fall Injury Risk  Goal: Absence of Fall and Fall-Related Injury  Intervention: Identify and Manage Contributors  Recent Flowsheet Documentation  Taken 4/9/2023 0600 by Dee Dee Gonzalez RN  Medication Review/Management: medications reviewed  Taken 4/9/2023 0500 by Dee Dee Gonzalez RN  Medication Review/Management: medications reviewed  Taken 4/9/2023 0400 by Dee Dee Gonzalez RN  Medication Review/Management: medications reviewed  Intervention: Promote Injury-Free Environment  Recent Flowsheet Documentation  Taken 4/9/2023 0600 by Dee Dee Gonzalez RN  Safety Promotion/Fall Prevention:   fall prevention program maintained   safety round/check completed  Taken 4/9/2023 0500 by Dee Dee Gonzalez RN  Safety Promotion/Fall Prevention:   safety round/check completed   fall prevention program maintained  Taken 4/9/2023 0400 by Dee Dee Gonzalez RN  Safety Promotion/Fall Prevention:   fall prevention program maintained   safety round/check completed     Problem: Fall Injury Risk  Goal: Absence of Fall and Fall-Related Injury  Intervention: Promote Injury-Free Environment  Recent Flowsheet Documentation  Taken 4/9/2023 0600 by Dee Dee Gonzalez RN  Safety Promotion/Fall Prevention:   fall prevention program maintained   safety round/check completed  Taken 4/9/2023 0500 by Dee Dee Gonzalez RN  Safety Promotion/Fall Prevention:   safety round/check completed   fall prevention program maintained  Taken 4/9/2023 0400 by Dee Dee Gonzalez RN  Safety Promotion/Fall Prevention:   fall prevention program maintained   safety round/check completed   Goal Outcome Evaluation:         Oriented x 2. On  Bipap. Bed locked, at lowest level, with alarm set.

## 2023-04-09 NOTE — PLAN OF CARE
Goal Outcome Evaluation:  Plan of Care Reviewed With: patient        Progress: improving  Outcome Evaluation: VSS; adequate urinary output and colostomy output; pt more awake with no confusion noted; no c/o pain; will continue to monitor.

## 2023-04-09 NOTE — ED PROVIDER NOTES
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 osteomyelitis.     [LK]   0022 Patient has a history of obstructive sleep apnea requiring 2 L nasal cannula oxygen.  Patient was 88% and increased to 93% on his 2 L NC [LK]   0034 Given patient's ABG pH 7.156, PCO2 60.8 PaO2 was 76.4 with an SPO2 of 90 and bicarb of 21.5 patient meets criteria for respiratory acidosis.    Prior ABGs from December shows that baseline pH is roughly 7.3 and PCO2 of 48.  Placing patient on BiPAP at this time [LK]      ED Course User Index  [LK] Nydia Russo DO                                           MDM    Final diagnoses:   Severe sepsis   Lingular pneumonia   Sacral osteomyelitis   Hepatic encephalopathy   Obstructive sleep apnea   Respiratory acidosis       ED Disposition  ED Disposition     ED Disposition   Decision to Admit    Condition   --    Comment   Level of Care: Critical Care [6]   Diagnosis: Severe sepsis [[id number]]   Admitting Physician: VIRGILIO TODD [[id number]]   Certification: I Certify That Inpatient Hospital Services Are Medically Necessary For Greater Than 2 Midnights               Franchesca Hodges APRN  [address] [facility] [address]  [phone]      Follow up with GUANACO Robles in 1 week.    [facility]  [address] [place] [id number]  [phone]        Franchesca Hodges APRN  [address] [facility]  [address]  [phone]               Medication List      New Prescriptions    amoxicillin-clavulanate 875-125 MG per tablet  Commonly known as: Augmentin  Take 1 tablet by mouth 2 (Two) Times a Day for 2 days.     lactulose 10 GM/15ML solution  Commonly known as: CHRONULAC  Take 15 mL by mouth 3 (Three) Times a Day.     Mucinex 600 MG 12 hr tablet  Generic drug: guaiFENesin  Take 2 tablets by mouth Every 12 (Twelve) Hours for 5 days.        Changed    bumetanide 2 MG tablet  Commonly known as: BUMEX  Take 1 tablet by mouth Daily.  What changed: when to take this           Where to Get Your Medications      These medications were sent to [address] [place] [address]    Hours: 8AM-6PM Mon-Fri Phone: [phone]   · amoxicillin-clavulanate 875-125 MG per tablet  · lactulose 10 GM/15ML solution  · Mucinex 600 MG 12 hr tablet     Information about where to get these medications is not yet available    Ask your nurse or doctor about these medications  · bumetanide 2 MG tablet          Nydia Russo DO  04/12/23 2003

## 2023-04-09 NOTE — PROGRESS NOTES
Nurse Practitioner - Brief Progress Note  PERMANENT  04/09/2023 04:33    [facility] - CCU - [facility] - [place] [place] [facility]    GLADIS DENG    Date of Service 04/09/2023 04:33    HPI/Events of Note [facility] Provider Assessment Note    47-year-old male with PMH significant for paraplegia, morbid obesity, ARLIN on CPAP, N ICM, left AKA, s/p ileal conduit and colostomy, CHF, HLD, HTN, sleep apnea, skin CA R UE, asthma, arthritis, recent admit for stage IV sacral decubitus ulcer with   osteomyelitis with cultures positive E coli with recent discharge on 04/04, presented to the ED for progressive lethargy and confusion x2 days, completed Rocephin on to stay with PICC removal.  Admitted for acute on chronic hypercapnic respiratory   failure likely 2/2 AMS, sepsis likely 2/2 HCAP and/or UTI and/or infected pressure ulcer, acute encephalopathy may be metabolic vs hepatic due to hyperammonemia vs elevated CO2, JAKE with high anion gap likely 2/2 lactic acidosis.    Assessment and Plan:    1. Acute on chronic hypercapnic/hypoxemic respiratory failure likely 2/2 AMS:  BiPAP, ABG, CXR read noted, nebs  2. Sepsis may be multifactorial 2/2 HCAP and/or UTI and/or infected decubitus ulcer:  Fluid bolus 1.5 L due to recent EF 21-25%, cultures, empiric vancomycin and Merrem, trend lactic, procal, recommend ID consult, CT chest/abdomen/pelvis read noted  3. Acute encephalopathy may be metabolic vs hepatic with hyperammonemia vs elevated CO2:  Ammonia level, lactulose enemas x2, recommend to evaluate for additional doses pending repeat ammonia level and evaluate if he is able to take p.o. at that time,   monitor neuro status  4. JAKE likely prerenal 2/2 sepsis with high anion gap likely secondary metabolic acidosis:  Monitor and correct electrolytes as needed, monitor renal function, avoid nephrotoxins, avoid fluid overload due to history of significant CHF  5. Elevated troponin may be 2/2 sepsis vs MI vs JAKE vs  demand ischemia:  Serial troponin, EKG read by me pending final read sinus rhythm with no obvious ST elevation, possible bundle branch block, , avoid QTC prolonging agents, check magnesium   level  6. Hyperglycemia: Check beta hydroxy, on insulin sliding scale, repeat glucose and if still elevated recommend insulin gtt     __x___   Video Assessment performed  __x___   Most recent labs reviewed  __x___   Vital Signs reviewed 88/47 (75), HR 97, R 27, 99% on BiPAP 22/6, backup rate 24, 21%  __x___   Best Practices addressed:                 VTE prophylaxis:  Heparin                 SUP (when indicated):  Protonix                 Current Glucose:  352, on insulin sliding scale                      Please notify bedside physician when present or picsell if glc > 180 X 2                 Sepsis guidelines:  No, avoid fluid overload due to EF                 Lung protective strategy: Not applicable                 Targeted Temperature Management:  Not applicable    _____     Spoke with bedside RN - spoke with unit RN as bedside RN unavailable, no needs, aware of orders  __x___     Orders written    Dictated using voice recognition technology.  Contact picsell for any needs if bedside physician is not present.       The patient is critically ill with respiratory failure; Critical care; 40 minutes total evaluation time         Interventions Major-Change in mental status - evaluation and management, Infection - evaluation and management, Respiratory failure - evaluation and management, Sepsis - evaluation and management  Intermediate-Best-practice therapies (e.g. VTE, beta blocker, etc.), Communication with other healthcare providers and/or family, Diagnostic test evaluation, Medication change / dose adjustment  Minor-Clinical assessment - ordering diagnostic tests        Electronically Signed by: Deandra Liu) on 04/09/2023 05:12    Annotated By: Mirza Lombardo)    Date: 04/09/23 06:18  Agree  with assessment and plan as outlined above
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
## 2023-04-09 NOTE — ED NOTES
MEDICAL SCREENING:    Reason for Visit: coccyx wound    Patient initially seen in triage.  The patient was advised further evaluation and diagnostic testing will be needed, some of the treatment and testing will be initiated in the lobby in order to begin the process.  The patient will be returned to the waiting area for the time being and possibly be re-assessed by a subsequent ED provider.  The patient will be brought back to the treatment area in as timely manner as possible.       Rosemarie Cotter, APRN  04/08/23 [id number]

## 2023-04-09 NOTE — PROGRESS NOTES
[facility] HOSPITALIST PROGRESS NOTE     Patient Identification:  Name:  Ken Krueger  Age:  47 y.o.  Sex:  male  :  1975  MRN:  [id number]  Visit Number:  [id number]  ROOM: [address]     Primary Care Provider:  Franchesca Hodges APRN    Length of stay in inpatient status:  0    Subjective     Chief Compliant:    Chief Complaint   Patient presents with   • Wound Infection       History of Presenting Illness:    Patient admitted after midnight. I have seen and evaluated patient. Patient already improving when evaluated this morning. Oriented to self. Following directions. Able to make conversation. Nursing staff reported patient had been taking BiPAP off intermittently. Acidosis resolved and BiPAP removed, patient saturating well on room air. Passed bedside swallow and diet started. No family bedside.       ROS:  Otherwise 10 point ROS negative other than documented above in HPI.     Objective     Current Hospital Meds:heparin (porcine), 5,000 Units, Subcutaneous, Q12H  insulin glargine, 1-200 Units, Subcutaneous, Nightly - Glucommander  insulin lispro, 1-200 Units, Subcutaneous, Q6H  lactulose, 10 g, Oral, TID  lactulose, 300 mL, Rectal, Once  meropenem, 1 g, Intravenous, Q8H  pantoprazole, 40 mg, Intravenous, Q AM  sodium chloride, 10 mL, Intravenous, Q12H  vancomycin, 750 mg, Intravenous, Q12H    Pharmacy to dose vancomycin,   sodium chloride, 75 mL/hr, Last Rate: 75 mL/hr (23 0849)        Current Antimicrobial Therapy:  Anti-Infectives (From admission, onward)    Ordered     Dose/Rate Route Frequency Start Stop    23 0417  meropenem (MERREM) 1 g in sodium chloride 0.9 % 100 mL IVPB-VTB        Ordering Provider: Cathy Burrell DO    1 g  over 3 Hours Intravenous Every 8 Hours 23 1300 23 1259    23 0526  vancomycin 750 mg/250 mL 0.9% NS IVPB ([facility])        Ordering Provider: Cathy Burrell DO    750 mg Intravenous Every 12 Hours 23 1200 23  1159    04/09/23 0417  meropenem (MERREM) 1 g in sodium chloride 0.9 % 100 mL IVPB-VTB        Ordering Provider: Cathy Burrell DO    1 g  over 30 Minutes Intravenous Once 04/09/23 0515 04/09/23 0627    04/09/23 0417  Pharmacy to dose vancomycin        Ordering Provider: Cathy Burrell DO     Does not apply Continuous PRN 04/09/23 0417 04/16/23 0416    04/08/23 2205  piperacillin-tazobactam (ZOSYN) IVPB 4.5 g in 100 mL NS VTB        Ordering Provider: Nydia Russo DO    4.5 g  over 30 Minutes Intravenous Once 04/08/23 2207 04/09/23 0011    04/08/23 2204  vancomycin (VANCOCIN) 1 g in sodium chloride 0.9 % 250 mL IVPB-VTB        Ordering Provider: Nydia Russo DO    1 g Intravenous Once 04/08/23 2206 04/09/23 0146        Current Diuretic Therapy:  Diuretics (From admission, onward)    None        ----------------------------------------------------------------------------------------------------------------------  Vital Signs:  Temp:  [97.1 °F (36.2 °C)-98.2 °F (36.8 °C)] 97.3 °F (36.3 °C)  Heart Rate:  [80-95] 95  Resp:  [16-27] 18  BP: ()/(47-80) 121/68  SpO2:  [88 %-99 %] 96 %  on   ;   Device (Oxygen Therapy): room air  Body mass index is 44.21 kg/m².    Wt Readings from Last 3 Encounters:   04/08/23 (!) 144 kg (317 lb)   02/21/23 (!) 138 kg (303 lb 9.2 oz)   02/20/23 (!) 139 kg (306 lb 14.1 oz)     Intake & Output (last 3 days)       04/06 0701 04/07 0700 04/07 0701  04/08 0700 04/08 0701  04/09 0700 04/09 0701  04/10 0700    IV Piggyback   1950 250    Total Intake(mL/kg)   1950 (13.5) 250 (1.7)    Net   +1950 +250                Diet: Cardiac Diets, Diabetic Diets; Healthy Heart (2-3 Na+); Consistent Carbohydrate; Texture: Regular Texture (IDDSI 7); Fluid Consistency: Thin (IDDSI 0)  ----------------------------------------------------------------------------------------------------------------------  Physical exam:  Constitutional:  Well-developed and well-nourished.  No respiratory  distress. Obese.     HENT:  Head:  Normocephalic and atraumatic.  Mouth:  Moist mucous membranes.    Eyes:  Conjunctivae and EOM are normal. No scleral icterus.    Neck:  Neck supple.  No JVD present.    Cardiovascular:  Normal rate, regular rhythm and normal heart sounds with no murmur.  Pulmonary/Chest:  No respiratory distress, no wheezes, no crackles, with normal breath sounds and good air movement.  Abdominal:  Soft.  Bowel sounds are normal.  No distension and no tenderness. Ileostomy and urostomy in place.   Musculoskeletal:  No edema, no tenderness, and no deformity.  Heel wound.   Neurological:  Alert and oriented to person, place, and time.  No cranial nerve deficit.  No tongue deviation.  No facial droop.  No slurred speech.   Skin:  Skin is warm and dry. No rash noted. No pallor.   Peripheral vascular:  Pulses in all 4 extremities with no clubbing, no cyanosis, no edema.  ----------------------------------------------------------------------------------------------------------------------  Tele:    ----------------------------------------------------------------------------------------------------------------------  Results from last 7 days   Lab Units 04/09/23  0528 04/09/23  0024 04/08/23  7103   CRP mg/dL  --   --  17.02*   LACTATE mmol/L  --  1.6 3.1*   WBC 10*3/mm3 14.56*  --  20.74*   HEMOGLOBIN g/dL 11.5*  --  13.2   HEMATOCRIT % 40.6  --  45.6   MCV fL 87.9  --  84.3   MCHC g/dL 28.3*  --  28.9*   PLATELETS 10*3/mm3 290  --  457*     Results from last 7 days   Lab Units 04/09/23  0929   PH, ARTERIAL pH units 7.342*   PO2 ART mm Hg 91.3   PCO2, ARTERIAL mm Hg 40.2   HCO3 ART mmol/L 21.8     Results from last 7 days   Lab Units 04/09/23  0528 04/08/23  2123   SODIUM mmol/L 130* 132*   POTASSIUM mmol/L 4.5 3.7   MAGNESIUM mg/dL 2.8* 2.4   CHLORIDE mmol/L 100 94*   CO2 mmol/L 13.8* 19.2*   BUN mg/dL 60* 54*   CREATININE mg/dL 1.84* 1.87*   CALCIUM mg/dL 8.6 9.6   PHOSPHORUS mg/dL 6.5*  --    GLUCOSE  mg/dL 361* 352*   ALBUMIN g/dL  --  3.7   BILIRUBIN mg/dL  --  0.4   ALK PHOS U/L  --  142*   AST (SGOT) U/L  --  33   ALT (SGPT) U/L  --  38   Estimated Creatinine Clearance: 73.9 mL/min (A) (by C-G formula based on SCr of 1.84 mg/dL (H)).  Ammonia   Date Value Ref Range Status   04/09/2023 87 (H) 16 - 60 umol/L Final     Comment:     Specimen hemolyzed.  Results may be affected.  2+ Hemolysis       04/08/2023 106 (H) 16 - 60 umol/L Final     Results from last 7 days   Lab Units 04/09/23  0024 04/08/23 2123   HSTROP T ng/L 69* 74*             Glucose   Date/Time Value Ref Range Status   04/09/2023 1211 248 (H) 70 - 130 mg/dL Final     Comment:     Meter: [id number] : [id number] Alexandra Laurence   04/09/2023 1125 315 (H) 70 - 130 mg/dL Final     Comment:     Meter: [id number] : [id number] Alexandra Laurence   04/09/2023 0841 357 (H) 70 - 130 mg/dL Final     Comment:     Meter: [id number] : [id number] Alexandra Laurence   04/09/2023 0552 389 (H) 70 - 130 mg/dL Final     Comment:     Meter: [id number] : [id number] ILEANA LAYA     Lab Results   Component Value Date    TSH 3.780 07/19/2022    FREET4 1.31 06/02/2021     No results found for: PREGTESTUR, PREGSERUM, HCG, HCGQUANT  Pain Management Panel     Pain Management Panel Latest Ref Rng & Units 4/9/2023 6/2/2021    CREATININE UR mg/dL 79.5 -    AMPHETAMINES SCREEN, URINE Negative - Negative    BARBITURATES SCREEN Negative - Negative    BENZODIAZEPINE SCREEN, URINE Negative - Negative    BUPRENORPHINEUR Negative - Negative    COCAINE SCREEN, URINE Negative - Negative    METHADONE SCREEN, URINE Negative - Negative        Brief Urine Lab Results  (Last result in the past 365 days)      Color   Clarity   Blood   Leuk Est   Nitrite   Protein   CREAT   Urine HCG        04/09/23 0709             79.5             No results found for: BLOODCX  No results found for: URINECX  No results found for: WOUNDCX  No results found for: STOOLCX  No results found for: RESPCX  No  results found for: AFBCX  Results from last 7 days   Lab Units 04/09/23  0024 04/08/23 2123   PROCALCITONIN ng/mL  --  0.45*   LACTATE mmol/L 1.6 3.1*   SED RATE mm/hr  --  >130*   CRP mg/dL  --  17.02*       I have personally looked at the labs and they are summarized above.  ----------------------------------------------------------------------------------------------------------------------  Detailed radiology reports for the last 24 hours:    Imaging Results (Last 24 Hours)     Procedure Component Value Units Date/Time    CT Abdomen Pelvis With Contrast [[id number]] Collected: 04/08/23 2330     Updated: 04/08/23 2332    Narrative:      CT Chest W, CT Abdomen Pelvis W    INDICATION:   Altered mental status, sacral ulcer, osteomyelitis    TECHNIQUE:   CT of the chest, abdomen and pelvis with IV contrast. Coronal and sagittal reformatted images. Radiation dose reduction techniques included automated exposure control or exposure modulation based on body size. Radiation audit for CT and nuclear  cardiology exams in the last 12 months: 2.    COMPARISON:   CT chest March 21, 2022 and CT abdomen/pelvis December 10, 2022    FINDINGS:     CHEST:    Lung windows demonstrate motion degradation which compromises evaluation. There are numerous scattered groundglass opacities and overall mosaic attenuation. There is focal consolidation at the level of the lingula, small in volume. Combination of  features may represent infectious or inflammatory process. Additional considerations include small airways or small vessels disease. The consolidation at the level the lingula likely represents inflammatory process.    Cardiomegaly. No pericardial effusion. No thoracic aortic aneurysm. No threshold lymphadenopathy.    Extensive postsurgical changes in the thoracic spine.    ABDOMEN/PELVIS:    Hepatomegaly and hepatic steatosis. Cholecystectomy. Normal appearance of the pancreas, spleen and both adrenal glands.    The kidneys enhance  symmetrically. A few nonobstructing renal calculi are present in the left kidney. No worrisome renal lesion. No hydronephrosis.    Postoperative changes of ileal conduit. Urinary bladder is chronically thick walled and contracted..    No evidence of bowel obstruction. Evidence of prior bowel resections with anastomotic sutures. A left lower quadrant ostomy is present. No perienteric inflammatory changes are identified.    Normal size abdominal aorta. No lymphadenopathy in the abdomen or pelvis.    Redemonstration of deeply penetrating ulcer at the level the coccyx. The coccyx appears chronically eroded but the appearance is not appreciably different as compared to February 12, 2023. This likely reflects chronic osteomyelitis or sequelae thereof.  There is no drainable collection identified or progression in osseous destruction.    Deeply penetrating bilateral ulcers at the level of the ischial tuberosities on both sides. Sclerosis of the initial tuberosities bilaterally likely reflect sequelae of chronic osteomyelitis. No interval bone destruction or drainable collection  identified.    Marked chronic deformity of the right hip and pelvis.      Impression:      1.  There are numerous scattered groundglass opacities and overall mosaic attenuation. There is focal consolidation at the level of the lingula, small in volume. Combination of features may represent infectious or inflammatory process. Additional  considerations include small airways or small vessels disease. The consolidation at the level the lingula likely represents inflammatory process.  2.  Redemonstration of deeply penetrating decubitus ulcer at the level the coccyx. The coccyx appears chronically eroded but the appearance is not appreciably different as compared to February 12, 2023. This likely reflects chronic osteomyelitis or  sequelae thereof. There is no drainable collection identified or progression in osseous destruction.  3.  Deeply penetrating  bilateral decubitus ulcers at the level of the ischial tuberosities on both sides. Sclerosis of the ischiul tuberosities bilaterally likely reflect sequelae of chronic osteomyelitis. No interval bone destruction or drainable  collection identified.  4.  Chronic changes as above.    Signer Name: JOHN CASTRO MD   Signed: 4/8/2023 11:30 PM   Workstation Name: [id number]    [facility]    CT Chest With Contrast Diagnostic [[id number]] Collected: 04/08/23 2330     Updated: 04/08/23 2332    Narrative:      CT Chest W, CT Abdomen Pelvis W    INDICATION:   Altered mental status, sacral ulcer, osteomyelitis    TECHNIQUE:   CT of the chest, abdomen and pelvis with IV contrast. Coronal and sagittal reformatted images. Radiation dose reduction techniques included automated exposure control or exposure modulation based on body size. Radiation audit for CT and nuclear  cardiology exams in the last 12 months: 2.    COMPARISON:   CT chest March 21, 2022 and CT abdomen/pelvis December 10, 2022    FINDINGS:     CHEST:    Lung windows demonstrate motion degradation which compromises evaluation. There are numerous scattered groundglass opacities and overall mosaic attenuation. There is focal consolidation at the level of the lingula, small in volume. Combination of  features may represent infectious or inflammatory process. Additional considerations include small airways or small vessels disease. The consolidation at the level the lingula likely represents inflammatory process.    Cardiomegaly. No pericardial effusion. No thoracic aortic aneurysm. No threshold lymphadenopathy.    Extensive postsurgical changes in the thoracic spine.    ABDOMEN/PELVIS:    Hepatomegaly and hepatic steatosis. Cholecystectomy. Normal appearance of the pancreas, spleen and both adrenal glands.    The kidneys enhance symmetrically. A few nonobstructing renal calculi are present in the left kidney. No worrisome renal lesion. No  hydronephrosis.    Postoperative changes of ileal conduit. Urinary bladder is chronically thick walled and contracted..    No evidence of bowel obstruction. Evidence of prior bowel resections with anastomotic sutures. A left lower quadrant ostomy is present. No perienteric inflammatory changes are identified.    Normal size abdominal aorta. No lymphadenopathy in the abdomen or pelvis.    Redemonstration of deeply penetrating ulcer at the level the coccyx. The coccyx appears chronically eroded but the appearance is not appreciably different as compared to February 12, 2023. This likely reflects chronic osteomyelitis or sequelae thereof.  There is no drainable collection identified or progression in osseous destruction.    Deeply penetrating bilateral ulcers at the level of the ischial tuberosities on both sides. Sclerosis of the initial tuberosities bilaterally likely reflect sequelae of chronic osteomyelitis. No interval bone destruction or drainable collection  identified.    Marked chronic deformity of the right hip and pelvis.      Impression:      1.  There are numerous scattered groundglass opacities and overall mosaic attenuation. There is focal consolidation at the level of the lingula, small in volume. Combination of features may represent infectious or inflammatory process. Additional  considerations include small airways or small vessels disease. The consolidation at the level the lingula likely represents inflammatory process.  2.  Redemonstration of deeply penetrating decubitus ulcer at the level the coccyx. The coccyx appears chronically eroded but the appearance is not appreciably different as compared to February 12, 2023. This likely reflects chronic osteomyelitis or  sequelae thereof. There is no drainable collection identified or progression in osseous destruction.  3.  Deeply penetrating bilateral decubitus ulcers at the level of the ischial tuberosities on both sides. Sclerosis of the ischiul  tuberosities bilaterally likely reflect sequelae of chronic osteomyelitis. No interval bone destruction or drainable  collection identified.  4.  Chronic changes as above.    Signer Name: JOHN CASTRO MD   Signed: 4/8/2023 11:30 PM   Workstation [facility]: [facility]    [facility]    XR Chest 1 View [[id number]] Collected: 04/08/23 2232     Updated: 04/08/23 2235    Narrative:      CHEST X-RAY, 4/8/2023      HISTORY:    47-year-old male in the ED undergoing sepsis protocol evaluation.      TECHNIQUE:  AP portable chest x-ray.    COMPARISON:  *  Chest x-ray, 3/29/2023.    FINDINGS:  Mild cardiomegaly. Pulmonary vascularity is normal. The lungs are clear. No pulmonary edema, focal infiltrate or pleural effusion.    PICC has been removed since the prior exam. Cervicothoracic fusion surgery with instrumentation.      Impression:      No active disease. No change since 3/29/2023.    Signer Name: Jaya Quinn MD   Signed: 4/8/2023 10:32 PM   Workstation Name: [email or web address]    [facility]        Assessment & Plan      #Sepsis 2/2 lingular pneumonia  #UTI vs. Colonization   #Chronic osteomyelitis w/ chronic associated wounds   #Metabolic encephalopathy (2/2 Sepsis and hypercapnia)  #Reactive thrombocytosis   #Lactic acidosis   - At this point, unclear source but multiple possibilities. Will follow cultures.   - Given respiratory symptoms leaning toward pneumonia driving sepsis   - Wound care APRN consulted but not overtly infected. CT with chronic osteomyelitic changes   - Continue vancomycin and meropenem with plans to deescalate soon.     #Acute on chronic hypercapnic respiratory failure   #ARLIN  - Resolved  - Continue CPAP when sleeping     #JAKE  - Likely contributing to HAGMA  - Etiology likely Prerenal vs. ATN in setting of sepsis   - Will gently hydrate and repeat labs tomorrow  - No indication for bicarb replacement at this point     #Uncontrolled DM II  - In the past  has been difficult to control with large doses of insulin in setting of dietary noncompliance  - Glucose trending up. Will start subQ basal/bolus regimen with glucommander   - HAGMA addressed above. No ketones in urine, do not suspect DKA    Chronic medical problems  -Compensated combined diastolic and systolic heart failure   -HTN  -HLD  -Morbid obesity   -Paraplegia 2/2 spinal cord injury   -GERD    F: PO  E: Replace as needed   N: CC    Code status: Full    Dispo: Continue CCU level of care. Hopeful to downgrade soon pending continued clinical improvement. PT/OT/SW consulted. Patient may need to consider SNF pending clinical improvement.       VTE Prophylaxis:   Mechanical Order History:     None      Pharmalogical Order History:      Ordered     Dose Route Frequency Stop    04/09/23 0417  heparin (porcine) 5000 UNIT/ML injection 5,000 Units         5,000 Units SC Every 12 Hours Scheduled --                Brannon Adrian MD  [facility]  04/09/23  12:38 EDT

## 2023-04-10 LAB
ALBUMIN SERPL-MCNC: 3.2 G/DL (ref 3.5–5.2)
ALBUMIN/GLOB SERPL: 0.7 G/DL
ALP SERPL-CCNC: 123 U/L (ref 39–117)
ALT SERPL W P-5'-P-CCNC: 42 U/L (ref 1–41)
ANION GAP SERPL CALCULATED.3IONS-SCNC: 10.3 MMOL/L (ref 5–15)
ANISOCYTOSIS BLD QL: NORMAL
AST SERPL-CCNC: 37 U/L (ref 1–40)
BASOPHILS # BLD AUTO: 0.03 10*3/MM3 (ref 0–0.2)
BASOPHILS NFR BLD AUTO: 0.3 % (ref 0–1.5)
BILIRUB SERPL-MCNC: 0.3 MG/DL (ref 0–1.2)
BUN SERPL-MCNC: 44 MG/DL (ref 6–20)
BUN/CREAT SERPL: 37.9 (ref 7–25)
CALCIUM SPEC-SCNC: 9 MG/DL (ref 8.6–10.5)
CHLORIDE SERPL-SCNC: 106 MMOL/L (ref 98–107)
CO2 SERPL-SCNC: 23.7 MMOL/L (ref 22–29)
CREAT SERPL-MCNC: 1.16 MG/DL (ref 0.76–1.27)
DEPRECATED RDW RBC AUTO: 50.8 FL (ref 37–54)
EGFRCR SERPLBLD CKD-EPI 2021: 79.2 ML/MIN/1.73
EOSINOPHIL # BLD AUTO: 0.12 10*3/MM3 (ref 0–0.4)
EOSINOPHIL NFR BLD AUTO: 1.2 % (ref 0.3–6.2)
ERYTHROCYTE [DISTWIDTH] IN BLOOD BY AUTOMATED COUNT: 15.4 % (ref 12.3–15.4)
GLOBULIN UR ELPH-MCNC: 4.4 GM/DL
GLUCOSE BLDC GLUCOMTR-MCNC: 183 MG/DL (ref 70–130)
GLUCOSE BLDC GLUCOMTR-MCNC: 202 MG/DL (ref 70–130)
GLUCOSE BLDC GLUCOMTR-MCNC: 227 MG/DL (ref 70–130)
GLUCOSE BLDC GLUCOMTR-MCNC: 233 MG/DL (ref 70–130)
GLUCOSE BLDC GLUCOMTR-MCNC: 240 MG/DL (ref 70–130)
GLUCOSE BLDC GLUCOMTR-MCNC: 251 MG/DL (ref 70–130)
GLUCOSE SERPL-MCNC: 229 MG/DL (ref 65–99)
HCT VFR BLD AUTO: 41 % (ref 37.5–51)
HGB BLD-MCNC: 11.2 G/DL (ref 13–17.7)
HYPOCHROMIA BLD QL: NORMAL
IMM GRANULOCYTES # BLD AUTO: 0.03 10*3/MM3 (ref 0–0.05)
IMM GRANULOCYTES NFR BLD AUTO: 0.3 % (ref 0–0.5)
LYMPHOCYTES # BLD AUTO: 1.71 10*3/MM3 (ref 0.7–3.1)
LYMPHOCYTES NFR BLD AUTO: 17.5 % (ref 19.6–45.3)
MCH RBC QN AUTO: 24.8 PG (ref 26.6–33)
MCHC RBC AUTO-ENTMCNC: 27.3 G/DL (ref 31.5–35.7)
MCV RBC AUTO: 90.7 FL (ref 79–97)
MONOCYTES # BLD AUTO: 0.47 10*3/MM3 (ref 0.1–0.9)
MONOCYTES NFR BLD AUTO: 4.8 % (ref 5–12)
NEUTROPHILS NFR BLD AUTO: 7.39 10*3/MM3 (ref 1.7–7)
NEUTROPHILS NFR BLD AUTO: 75.9 % (ref 42.7–76)
NRBC BLD AUTO-RTO: 0 /100 WBC (ref 0–0.2)
PLAT MORPH BLD: NORMAL
PLATELET # BLD AUTO: 305 10*3/MM3 (ref 140–450)
PMV BLD AUTO: 9.3 FL (ref 6–12)
POIKILOCYTOSIS BLD QL SMEAR: NORMAL
POTASSIUM SERPL-SCNC: 3.6 MMOL/L (ref 3.5–5.2)
PROT SERPL-MCNC: 7.6 G/DL (ref 6–8.5)
RBC # BLD AUTO: 4.52 10*6/MM3 (ref 4.14–5.8)
SODIUM SERPL-SCNC: 140 MMOL/L (ref 136–145)
WBC NRBC COR # BLD: 9.75 10*3/MM3 (ref 3.4–10.8)

## 2023-04-10 PROCEDURE — 25010000002 MEROPENEM PER 100 MG: Performed by: INTERNAL MEDICINE

## 2023-04-10 PROCEDURE — 80053 COMPREHEN METABOLIC PANEL: CPT | Performed by: INTERNAL MEDICINE

## 2023-04-10 PROCEDURE — 82962 GLUCOSE BLOOD TEST: CPT

## 2023-04-10 PROCEDURE — 63710000001 INSULIN GLARGINE PER 5 UNITS: Performed by: INTERNAL MEDICINE

## 2023-04-10 PROCEDURE — 85025 COMPLETE CBC W/AUTO DIFF WBC: CPT | Performed by: INTERNAL MEDICINE

## 2023-04-10 PROCEDURE — 25010000002 VANCOMYCIN 5 G RECONSTITUTED SOLUTION: Performed by: INTERNAL MEDICINE

## 2023-04-10 PROCEDURE — 99233 SBSQ HOSP IP/OBS HIGH 50: CPT | Performed by: INTERNAL MEDICINE

## 2023-04-10 PROCEDURE — 63710000001 INSULIN LISPRO (HUMAN) PER 5 UNITS: Performed by: INTERNAL MEDICINE

## 2023-04-10 PROCEDURE — 94799 UNLISTED PULMONARY SVC/PX: CPT

## 2023-04-10 PROCEDURE — 94761 N-INVAS EAR/PLS OXIMETRY MLT: CPT

## 2023-04-10 PROCEDURE — 85007 BL SMEAR W/DIFF WBC COUNT: CPT | Performed by: INTERNAL MEDICINE

## 2023-04-10 RX ORDER — NICOTINE POLACRILEX 4 MG
15 LOZENGE BUCCAL
Status: DISCONTINUED | OUTPATIENT
Start: 2023-04-10 | End: 2023-04-12 | Stop reason: HOSPADM

## 2023-04-10 RX ORDER — INSULIN LISPRO 100 [IU]/ML
1-200 INJECTION, SOLUTION INTRAVENOUS; SUBCUTANEOUS AS NEEDED
Status: DISCONTINUED | OUTPATIENT
Start: 2023-04-10 | End: 2023-04-12 | Stop reason: HOSPADM

## 2023-04-10 RX ORDER — POTASSIUM CHLORIDE 20 MEQ/1
40 TABLET, EXTENDED RELEASE ORAL ONCE
Status: COMPLETED | OUTPATIENT
Start: 2023-04-10 | End: 2023-04-10

## 2023-04-10 RX ORDER — DEXTROSE MONOHYDRATE 25 G/50ML
10-50 INJECTION, SOLUTION INTRAVENOUS
Status: DISCONTINUED | OUTPATIENT
Start: 2023-04-10 | End: 2023-04-12 | Stop reason: HOSPADM

## 2023-04-10 RX ORDER — GUAIFENESIN 600 MG/1
1200 TABLET, EXTENDED RELEASE ORAL EVERY 12 HOURS SCHEDULED
Status: DISCONTINUED | OUTPATIENT
Start: 2023-04-10 | End: 2023-04-12 | Stop reason: HOSPADM

## 2023-04-10 RX ORDER — SODIUM HYPOCHLORITE 1.25 MG/ML
SOLUTION TOPICAL 2 TIMES DAILY
Status: DISCONTINUED | OUTPATIENT
Start: 2023-04-10 | End: 2023-04-12 | Stop reason: HOSPADM

## 2023-04-10 RX ORDER — PANTOPRAZOLE SODIUM 40 MG/1
40 TABLET, DELAYED RELEASE ORAL
Status: DISCONTINUED | OUTPATIENT
Start: 2023-04-11 | End: 2023-04-12 | Stop reason: HOSPADM

## 2023-04-10 RX ORDER — INSULIN LISPRO 100 [IU]/ML
1-200 INJECTION, SOLUTION INTRAVENOUS; SUBCUTANEOUS
Status: DISCONTINUED | OUTPATIENT
Start: 2023-04-10 | End: 2023-04-12 | Stop reason: HOSPADM

## 2023-04-10 RX ADMIN — BACLOFEN 30 MG: 10 TABLET ORAL at 08:20

## 2023-04-10 RX ADMIN — MAGNESIUM GLUCONATE 500 MG ORAL TABLET 1200 MG: 500 TABLET ORAL at 08:13

## 2023-04-10 RX ADMIN — MEROPENEM 1 G: 1 INJECTION, POWDER, FOR SOLUTION INTRAVENOUS at 13:11

## 2023-04-10 RX ADMIN — DAKIN'S SOLUTION 0.125% (QUARTER STRENGTH): 0.12 SOLUTION at 22:00

## 2023-04-10 RX ADMIN — INSULIN LISPRO 6 UNITS: 100 INJECTION, SOLUTION INTRAVENOUS; SUBCUTANEOUS at 21:59

## 2023-04-10 RX ADMIN — INSULIN LISPRO 17 UNITS: 100 INJECTION, SOLUTION INTRAVENOUS; SUBCUTANEOUS at 12:17

## 2023-04-10 RX ADMIN — DULOXETINE HYDROCHLORIDE 60 MG: 60 CAPSULE, DELAYED RELEASE ORAL at 08:10

## 2023-04-10 RX ADMIN — OXYCODONE HYDROCHLORIDE AND ACETAMINOPHEN 500 MG: 500 TABLET ORAL at 08:15

## 2023-04-10 RX ADMIN — APIXABAN 5 MG: 5 TABLET, FILM COATED ORAL at 08:15

## 2023-04-10 RX ADMIN — ATORVASTATIN CALCIUM 10 MG: 10 TABLET, FILM COATED ORAL at 21:59

## 2023-04-10 RX ADMIN — GABAPENTIN 800 MG: 400 CAPSULE ORAL at 13:11

## 2023-04-10 RX ADMIN — MEROPENEM 1 G: 1 INJECTION, POWDER, FOR SOLUTION INTRAVENOUS at 21:59

## 2023-04-10 RX ADMIN — BACLOFEN 30 MG: 10 TABLET ORAL at 12:18

## 2023-04-10 RX ADMIN — LACTULOSE 10 G: 20 SOLUTION ORAL at 22:00

## 2023-04-10 RX ADMIN — VANCOMYCIN HYDROCHLORIDE 750 MG: 5 INJECTION, POWDER, LYOPHILIZED, FOR SOLUTION INTRAVENOUS at 00:15

## 2023-04-10 RX ADMIN — Medication 1000 UNITS: at 08:10

## 2023-04-10 RX ADMIN — INSULIN LISPRO 12 UNITS: 100 INJECTION, SOLUTION INTRAVENOUS; SUBCUTANEOUS at 08:57

## 2023-04-10 RX ADMIN — FERROUS SULFATE TAB 325 MG (65 MG ELEMENTAL FE) 325 MG: 325 (65 FE) TAB at 08:15

## 2023-04-10 RX ADMIN — SUCRALFATE 1 G: 1 TABLET ORAL at 08:10

## 2023-04-10 RX ADMIN — INSULIN GLARGINE 34 UNITS: 100 INJECTION, SOLUTION SUBCUTANEOUS at 22:00

## 2023-04-10 RX ADMIN — LACTULOSE 10 G: 20 SOLUTION ORAL at 08:16

## 2023-04-10 RX ADMIN — MODAFINIL 200 MG: 100 TABLET ORAL at 08:33

## 2023-04-10 RX ADMIN — FERROUS SULFATE TAB 325 MG (65 MG ELEMENTAL FE) 325 MG: 325 (65 FE) TAB at 21:58

## 2023-04-10 RX ADMIN — GUAIFENESIN 600 MG: 600 TABLET, EXTENDED RELEASE ORAL at 08:15

## 2023-04-10 RX ADMIN — APIXABAN 5 MG: 5 TABLET, FILM COATED ORAL at 21:58

## 2023-04-10 RX ADMIN — LACTULOSE 10 G: 20 SOLUTION ORAL at 16:22

## 2023-04-10 RX ADMIN — GUAIFENESIN 1200 MG: 600 TABLET, EXTENDED RELEASE ORAL at 21:59

## 2023-04-10 RX ADMIN — GABAPENTIN 800 MG: 400 CAPSULE ORAL at 05:47

## 2023-04-10 RX ADMIN — DULOXETINE HYDROCHLORIDE 60 MG: 60 CAPSULE, DELAYED RELEASE ORAL at 21:59

## 2023-04-10 RX ADMIN — PANTOPRAZOLE SODIUM 40 MG: 40 INJECTION, POWDER, FOR SOLUTION INTRAVENOUS at 05:45

## 2023-04-10 RX ADMIN — Medication 10 ML: at 21:58

## 2023-04-10 RX ADMIN — DICYCLOMINE HYDROCHLORIDE 10 MG: 10 CAPSULE ORAL at 21:58

## 2023-04-10 RX ADMIN — INSULIN LISPRO 4 UNITS: 100 INJECTION, SOLUTION INTRAVENOUS; SUBCUTANEOUS at 00:23

## 2023-04-10 RX ADMIN — ARIPIPRAZOLE 10 MG: 10 TABLET ORAL at 21:58

## 2023-04-10 RX ADMIN — BACLOFEN 30 MG: 10 TABLET ORAL at 17:39

## 2023-04-10 RX ADMIN — Medication 1 TABLET: at 08:11

## 2023-04-10 RX ADMIN — INSULIN LISPRO 17 UNITS: 100 INJECTION, SOLUTION INTRAVENOUS; SUBCUTANEOUS at 17:01

## 2023-04-10 RX ADMIN — Medication 1 CAPSULE: at 08:11

## 2023-04-10 RX ADMIN — MEROPENEM 1 G: 1 INJECTION, POWDER, FOR SOLUTION INTRAVENOUS at 05:45

## 2023-04-10 RX ADMIN — POTASSIUM CHLORIDE 40 MEQ: 20 TABLET, EXTENDED RELEASE ORAL at 08:33

## 2023-04-10 RX ADMIN — GABAPENTIN 800 MG: 400 CAPSULE ORAL at 21:58

## 2023-04-10 RX ADMIN — CARVEDILOL 12.5 MG: 6.25 TABLET, FILM COATED ORAL at 08:11

## 2023-04-10 RX ADMIN — DICYCLOMINE HYDROCHLORIDE 10 MG: 10 CAPSULE ORAL at 08:16

## 2023-04-10 RX ADMIN — BACLOFEN 30 MG: 10 TABLET ORAL at 21:59

## 2023-04-10 NOTE — PROGRESS NOTES
Nutrition Services    Patient Name:  Ken Krueger  YOB: 1975  MRN: [id number]  Admit Date:  4/8/2023    Noted non healing wound.  Labs and meds noted.  Will add extra protein on meal trays.  Recommend MVI daily to aide with wound healing.     Electronically signed by:  Rin Medina RD  04/10/23 12:39 EDT

## 2023-04-10 NOTE — PROGRESS NOTES
[facility] HOSPITALIST PROGRESS NOTE    Subjective     History:   Ken Krueger is a 47 y.o. male admitted on 4/8/2023 secondary to Severe sepsis     Procedures: None    CC: Follow up resp failure, pneumonia    Patient seen and examined. Sleeping on home CPAP upon my arrival but easily awakens. Answers orientation questions appropriately. States he feels some better with improving dyspnea. Reports cough. No reported CP. No reported nausea or vomiting. No acute events overnight per RN.     History taken from: patient, chart, and RN.      Objective     Vital Signs  Temp:  [98.3 °F (36.8 °C)-98.9 °F (37.2 °C)] 98.7 °F (37.1 °C)  Heart Rate:  [] 77  Resp:  [10-22] 16  BP: ()/(52-86) 108/66    Intake/Output Summary (Last 24 hours) at 4/10/2023 1055  Last data filed at 4/10/2023 0545  Gross per 24 hour   Intake 3561.25 ml   Output 3100 ml   Net 461.25 ml         Physical Exam:  General:    Awake, alert, currently on CPAP, in no acute distress, obese   Heart:      Normal S1 and S2. Regular rate and rhythm. No significant murmur, rubs or gallops appreciated.   Lungs:     Respirations regular, even and unlabored. Lungs clear to auscultation B/L. No wheezes, rales or rhonchi.   Abdomen:   Soft and nontender. No guarding, rebound tenderness or  organomegaly noted. Bowel sounds present x 4. (+) ileostomy and urostomy.    Extremities:  No edema RLE. (+) left AKA.      Results Review:    Results from last 7 days   Lab Units 04/10/23  0014 04/09/23  0528 04/08/23 2123   WBC 10*3/mm3 9.75 14.56* 20.74*   HEMOGLOBIN g/dL 11.2* 11.5* 13.2   PLATELETS 10*3/mm3 305 290 457*     Results from last 7 days   Lab Units 04/10/23  0014 04/09/23  0528 04/08/23 2123   SODIUM mmol/L 140 130* 132*   POTASSIUM mmol/L 3.6 4.5 3.7   CHLORIDE mmol/L 106 100 94*   CO2 mmol/L 23.7 13.8* 19.2*   BUN mg/dL 44* 60* 54*   CREATININE mg/dL 1.16 1.84* 1.87*   CALCIUM mg/dL 9.0 8.6 9.6   GLUCOSE mg/dL 229* 361* 352*     Results  from last 7 days   Lab Units 04/10/23  0014 04/08/23 2123   BILIRUBIN mg/dL 0.3 0.4   ALK PHOS U/L 123* 142*   AST (SGOT) U/L 37 33   ALT (SGPT) U/L 42* 38     Results from last 7 days   Lab Units 04/09/23  0528 04/08/23 2123   MAGNESIUM mg/dL 2.8* 2.4         Results from last 7 days   Lab Units 04/09/23  0024 04/08/23 2123   HSTROP T ng/L 69* 74*       Imaging Results (Last 24 Hours)     ** No results found for the last 24 hours. **            Medications:  Acidophilus/Pectin, 1 capsule, Oral, Daily  apixaban, 5 mg, Oral, Q12H  ARIPiprazole, 10 mg, Oral, Nightly  ascorbic acid, 500 mg, Oral, Daily  atorvastatin, 10 mg, Oral, Nightly  baclofen, 30 mg, Oral, 4x Daily With Meals & Nightly  carvedilol, 12.5 mg, Oral, BID With Meals  cholecalciferol, 1,000 Units, Oral, Daily  dicyclomine, 10 mg, Oral, BID  DULoxetine, 60 mg, Oral, BID  ferrous sulfate, 325 mg, Oral, BID  gabapentin, 800 mg, Oral, Q8H  guaiFENesin, 1,200 mg, Oral, Q12H  insulin glargine, 1-200 Units, Subcutaneous, Nightly - Glucommander  insulin lispro, 1-200 Units, Subcutaneous, 4x Daily With Meals & Nightly  lactulose, 10 g, Oral, TID  magnesium oxide, 1,200 mg, Oral, Daily  meropenem, 1 g, Intravenous, Q8H  methenamine, 1 g, Oral, BID With Meals  modafinil, 200 mg, Oral, Daily  multivitamin with minerals, 1 tablet, Oral, Daily  pantoprazole, 40 mg, Oral, Q AM  sodium chloride, 10 mL, Intravenous, Q12H  sucralfate, 1 g, Oral, Daily  vancomycin, 1,500 mg, Intravenous, Q12H      Pharmacy to dose vancomycin,             Assessment & Plan   Severe sepsis with resp failure requiring BiPAP (present on admission): Likely 2/2 lingular pneumonia and possible UTI.  Blood cultures with NGTD. Urine culture reveals mixed sil. MRSA PCR is negative. Stop Vanc. Cont Merrem with plans to further deescalate soon. Cont to follow cultures.     Acute on chronic hypercapnic respiratory failure: Likely 2/2 above. Improved with BiPAP. Cont treatment as outlined above.  Cont CPAP.     JAKE: Likely prerenal vs ATN in the setting of sepsis. Improved. Stop IVF's. Monitor UOP and repeat labs in the AM.     Acute metabolic encephalopathy: Likely 2/2 above. Now improved. Cont treatment as outlined above and supportive treatment.     Anion gap metabolic acidosis: Likely 2/2 above with elevated lactate. No evidence of DKA. Lactate has normalized. Improved. Cont to monitor.     DM II, insulin dependent with hyperglycemia: HgbA1c in 2/23 11.5. Noncompliance thought to be contributing. As pt is no longer NPO will transition to SQ Glucommander with meal time coverage. Cont to monitor.     Chronic osteomyelitis with chronic associated wounds: Recently hospitalized for course of IV antibiotics. Appears stable at present. Wound care consulted.     Chronic combined systolic and diastolic CHF: Currently compensated. IVF's stopped. Cont to monitor volume status.     Essential HTN: BP stable. Cont Coreg. Cont to monitor.     ARLIN: Cont CPAP.     Paraplegia: S/P spinal cord injury. Cont supportive treatment.     Hx of PE: Cont Eliquis     Morbid obesity by BMI: Complicates all aspects of acre.     DVT PPX: Eliquis    Pt is at high risk 2/2 severe sepsis with resp failure requiring BiPAP, pneumonia, acute on chronic resp failure, encephalopathy, JAKE, metabolic acidosis, hyperglycemia.       Disposition: Unclear at present in the setting of acute illness/recurrent hospitalization. Possibly home pending clinical improvement with IV abx. Consult SS to assist with discharge planning as pt may benefit from SNF placement.     Javier Moscoso DO  04/10/23  10:55 EDT

## 2023-04-10 NOTE — PROGRESS NOTES
Kinetics :    Vancomycin   Day    2    The pre-dose vancomycin level was below the desired goal range for this therapy.    We will empirically adjust the regimen to vancomycin 1500mg q 12 hrs to maximize the auc / trough level projections and monitor with you.

## 2023-04-10 NOTE — PLAN OF CARE
Goal Outcome Evaluation:  Plan of Care Reviewed With: patient        Progress: no change  Outcome Evaluation: Pt arrived to floor A&O x4. Pt rested well throughout the day. No acute changes noted at this time. Will continue to follow plan of care.

## 2023-04-10 NOTE — CASE MANAGEMENT/SOCIAL WORK
Discharge Planning Assessment   Fabricio     Patient Name: Ken Krueger  MRN: [id number]  Today's Date: 4/10/2023    Admit Date: 4/8/2023     Discharge Needs Assessment     Row Name 04/10/23 1744       Living Environment    People in Home spouse    Name(s) of People in Home Pindea Krueger (Brother/POA)    Current Living Arrangements home    Potentially Unsafe Housing Conditions none    Primary Care Provided by self;other (see comments)    Provides Primary Care For no one    Family Caregiver if Needed sibling(s)    Family Caregiver Names Brother Pineda    Quality of Family Relationships helpful;involved;supportive    Able to Return to Prior Arrangements yes       Resource/Environmental Concerns    Resource/Environmental Concerns none       Food Insecurity    Within the past 12 months, you worried that your food would run out before you got the money to buy more. Never true    Within the past 12 months, the food you bought just didn't last and you didn't have money to get more. Never true       Transition Planning    Patient/Family Anticipates Transition to home with family    Patient/Family Anticipated Services at Transition none    Transportation Anticipated family or friend will provide       Discharge Needs Assessment    Equipment Currently Used at Home wheelchair, motorized;grab bar;slide board;cpap;oxygen    Concerns to be Addressed discharge planning    Discharge Facility/Level of Care Needs home with home health               Discharge Plan     Row Name 04/10/23 1743       Plan    Plan Pt was admitted on 04/08/23. SS received CM consult for admit from , assist with safe disposition needs. SS spoke with Pt at bedside on this date. Pt lives with merriller/ JN Sung and plans to return home at discharge. [facility] utilizes [facility]. [facility] will need new HH order at discharge. Pt utilizes a cpap machine, grabs, oxygen, patricio lift, sling, sliding board, motorized wheelchair, and hospital bed via [facility]. Pt  does not have a living will. Pt's POA is brother, Pineda. POA papwerwork is not on file. Pt's brother to provide transportation at discharge. SS discussed Pt with previous swing bed RN Helder who states Pt has used all his swing bed days. SS to follow.                     PILO OlivoW

## 2023-04-10 NOTE — NURSING NOTE
In room with GUANACO Chandra to evaluate wounds.    Patient with chronic stage 4 PI to coccyx, right gluteal and left gluteal.  He has DTPI to his right posterior heel and his right lateral ankle.  See attached flow sheet documentation.      PI prevention orders initiated.     Rental bed order placed with Shalini.  Envella with trapeze to be delivered tomorrow morning.
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
## 2023-04-11 ENCOUNTER — APPOINTMENT (OUTPATIENT)
Dept: CT IMAGING | Facility: HOSPITAL | Age: 48
DRG: 698 | End: 2023-04-11
Payer: MEDICARE

## 2023-04-11 LAB
ALBUMIN SERPL-MCNC: 3.3 G/DL (ref 3.5–5.2)
ALBUMIN/GLOB SERPL: 0.7 G/DL
ALP SERPL-CCNC: 137 U/L (ref 39–117)
ALT SERPL W P-5'-P-CCNC: 48 U/L (ref 1–41)
ANION GAP SERPL CALCULATED.3IONS-SCNC: 12.1 MMOL/L (ref 5–15)
ANISOCYTOSIS BLD QL: NORMAL
AST SERPL-CCNC: 32 U/L (ref 1–40)
BASOPHILS # BLD AUTO: 0.05 10*3/MM3 (ref 0–0.2)
BASOPHILS NFR BLD AUTO: 0.5 % (ref 0–1.5)
BILIRUB SERPL-MCNC: 0.2 MG/DL (ref 0–1.2)
BUN SERPL-MCNC: 35 MG/DL (ref 6–20)
BUN/CREAT SERPL: 31.8 (ref 7–25)
CALCIUM SPEC-SCNC: 9.1 MG/DL (ref 8.6–10.5)
CHLORIDE SERPL-SCNC: 103 MMOL/L (ref 98–107)
CO2 SERPL-SCNC: 20.9 MMOL/L (ref 22–29)
CREAT SERPL-MCNC: 1.1 MG/DL (ref 0.76–1.27)
DEPRECATED RDW RBC AUTO: 47 FL (ref 37–54)
EGFRCR SERPLBLD CKD-EPI 2021: 84.4 ML/MIN/1.73
EOSINOPHIL # BLD AUTO: 0.2 10*3/MM3 (ref 0–0.4)
EOSINOPHIL NFR BLD AUTO: 2 % (ref 0.3–6.2)
ERYTHROCYTE [DISTWIDTH] IN BLOOD BY AUTOMATED COUNT: 15.1 % (ref 12.3–15.4)
GLOBULIN UR ELPH-MCNC: 5 GM/DL
GLUCOSE BLDC GLUCOMTR-MCNC: 199 MG/DL (ref 70–130)
GLUCOSE BLDC GLUCOMTR-MCNC: 226 MG/DL (ref 70–130)
GLUCOSE BLDC GLUCOMTR-MCNC: 233 MG/DL (ref 70–130)
GLUCOSE BLDC GLUCOMTR-MCNC: 254 MG/DL (ref 70–130)
GLUCOSE SERPL-MCNC: 220 MG/DL (ref 65–99)
HCT VFR BLD AUTO: 40.6 % (ref 37.5–51)
HGB BLD-MCNC: 11.5 G/DL (ref 13–17.7)
HYPOCHROMIA BLD QL: NORMAL
IMM GRANULOCYTES # BLD AUTO: 0.04 10*3/MM3 (ref 0–0.05)
IMM GRANULOCYTES NFR BLD AUTO: 0.4 % (ref 0–0.5)
LYMPHOCYTES # BLD AUTO: 2.18 10*3/MM3 (ref 0.7–3.1)
LYMPHOCYTES NFR BLD AUTO: 21.5 % (ref 19.6–45.3)
MAGNESIUM SERPL-MCNC: 2.5 MG/DL (ref 1.6–2.6)
MCH RBC QN AUTO: 24.1 PG (ref 26.6–33)
MCHC RBC AUTO-ENTMCNC: 28.3 G/DL (ref 31.5–35.7)
MCV RBC AUTO: 85.1 FL (ref 79–97)
MONOCYTES # BLD AUTO: 0.39 10*3/MM3 (ref 0.1–0.9)
MONOCYTES NFR BLD AUTO: 3.8 % (ref 5–12)
NEUTROPHILS NFR BLD AUTO: 7.29 10*3/MM3 (ref 1.7–7)
NEUTROPHILS NFR BLD AUTO: 71.8 % (ref 42.7–76)
NRBC BLD AUTO-RTO: 0 /100 WBC (ref 0–0.2)
PLAT MORPH BLD: NORMAL
PLATELET # BLD AUTO: 362 10*3/MM3 (ref 140–450)
PMV BLD AUTO: 9.6 FL (ref 6–12)
POTASSIUM SERPL-SCNC: 3.9 MMOL/L (ref 3.5–5.2)
PROT SERPL-MCNC: 8.3 G/DL (ref 6–8.5)
RBC # BLD AUTO: 4.77 10*6/MM3 (ref 4.14–5.8)
SODIUM SERPL-SCNC: 136 MMOL/L (ref 136–145)
WBC NRBC COR # BLD: 10.15 10*3/MM3 (ref 3.4–10.8)

## 2023-04-11 PROCEDURE — 71275 CT ANGIOGRAPHY CHEST: CPT

## 2023-04-11 PROCEDURE — 87205 SMEAR GRAM STAIN: CPT | Performed by: INTERNAL MEDICINE

## 2023-04-11 PROCEDURE — 82962 GLUCOSE BLOOD TEST: CPT

## 2023-04-11 PROCEDURE — 94799 UNLISTED PULMONARY SVC/PX: CPT

## 2023-04-11 PROCEDURE — 85007 BL SMEAR W/DIFF WBC COUNT: CPT | Performed by: INTERNAL MEDICINE

## 2023-04-11 PROCEDURE — 25010000002 PIPERACILLIN SOD-TAZOBACTAM PER 1 G: Performed by: INTERNAL MEDICINE

## 2023-04-11 PROCEDURE — 94761 N-INVAS EAR/PLS OXIMETRY MLT: CPT

## 2023-04-11 PROCEDURE — 85025 COMPLETE CBC W/AUTO DIFF WBC: CPT | Performed by: INTERNAL MEDICINE

## 2023-04-11 PROCEDURE — 99232 SBSQ HOSP IP/OBS MODERATE 35: CPT | Performed by: INTERNAL MEDICINE

## 2023-04-11 PROCEDURE — 83735 ASSAY OF MAGNESIUM: CPT | Performed by: INTERNAL MEDICINE

## 2023-04-11 PROCEDURE — 87070 CULTURE OTHR SPECIMN AEROBIC: CPT | Performed by: INTERNAL MEDICINE

## 2023-04-11 PROCEDURE — 71275 CT ANGIOGRAPHY CHEST: CPT | Performed by: RADIOLOGY

## 2023-04-11 PROCEDURE — 25510000001 IOPAMIDOL PER 1 ML: Performed by: INTERNAL MEDICINE

## 2023-04-11 PROCEDURE — 80053 COMPREHEN METABOLIC PANEL: CPT | Performed by: INTERNAL MEDICINE

## 2023-04-11 PROCEDURE — 63710000001 INSULIN GLARGINE PER 5 UNITS: Performed by: INTERNAL MEDICINE

## 2023-04-11 PROCEDURE — 63710000001 INSULIN LISPRO (HUMAN) PER 5 UNITS: Performed by: INTERNAL MEDICINE

## 2023-04-11 PROCEDURE — 25010000002 MEROPENEM PER 100 MG: Performed by: INTERNAL MEDICINE

## 2023-04-11 RX ORDER — ACETAMINOPHEN 325 MG/1
650 TABLET ORAL EVERY 6 HOURS PRN
Status: DISCONTINUED | OUTPATIENT
Start: 2023-04-11 | End: 2023-04-12 | Stop reason: HOSPADM

## 2023-04-11 RX ORDER — DIPHENOXYLATE HYDROCHLORIDE AND ATROPINE SULFATE 2.5; .025 MG/1; MG/1
1 TABLET ORAL DAILY
Status: DISCONTINUED | OUTPATIENT
Start: 2023-04-11 | End: 2023-04-12 | Stop reason: HOSPADM

## 2023-04-11 RX ADMIN — METHENAMINE HIPPURATE 1 G: 1 TABLET ORAL at 10:00

## 2023-04-11 RX ADMIN — MAGNESIUM GLUCONATE 500 MG ORAL TABLET 1200 MG: 500 TABLET ORAL at 08:31

## 2023-04-11 RX ADMIN — INSULIN LISPRO 20 UNITS: 100 INJECTION, SOLUTION INTRAVENOUS; SUBCUTANEOUS at 11:51

## 2023-04-11 RX ADMIN — DAKIN'S SOLUTION 0.125% (QUARTER STRENGTH): 0.12 SOLUTION at 08:38

## 2023-04-11 RX ADMIN — LACTULOSE 10 G: 20 SOLUTION ORAL at 20:19

## 2023-04-11 RX ADMIN — DICYCLOMINE HYDROCHLORIDE 10 MG: 10 CAPSULE ORAL at 20:19

## 2023-04-11 RX ADMIN — Medication 1 TABLET: at 10:00

## 2023-04-11 RX ADMIN — FERROUS SULFATE TAB 325 MG (65 MG ELEMENTAL FE) 325 MG: 325 (65 FE) TAB at 08:31

## 2023-04-11 RX ADMIN — ATORVASTATIN CALCIUM 10 MG: 10 TABLET, FILM COATED ORAL at 20:19

## 2023-04-11 RX ADMIN — BACLOFEN 30 MG: 10 TABLET ORAL at 20:19

## 2023-04-11 RX ADMIN — GUAIFENESIN 1200 MG: 600 TABLET, EXTENDED RELEASE ORAL at 20:19

## 2023-04-11 RX ADMIN — METHENAMINE HIPPURATE 1 G: 1 TABLET ORAL at 17:37

## 2023-04-11 RX ADMIN — LACTULOSE 10 G: 20 SOLUTION ORAL at 08:38

## 2023-04-11 RX ADMIN — Medication 10 ML: at 20:20

## 2023-04-11 RX ADMIN — Medication 1000 UNITS: at 08:32

## 2023-04-11 RX ADMIN — INSULIN GLARGINE 37 UNITS: 100 INJECTION, SOLUTION SUBCUTANEOUS at 22:54

## 2023-04-11 RX ADMIN — LACTULOSE 10 G: 20 SOLUTION ORAL at 15:05

## 2023-04-11 RX ADMIN — CARVEDILOL 12.5 MG: 6.25 TABLET, FILM COATED ORAL at 08:32

## 2023-04-11 RX ADMIN — INSULIN LISPRO 6 UNITS: 100 INJECTION, SOLUTION INTRAVENOUS; SUBCUTANEOUS at 22:54

## 2023-04-11 RX ADMIN — DAKIN'S SOLUTION 0.125% (QUARTER STRENGTH): 0.12 SOLUTION at 20:22

## 2023-04-11 RX ADMIN — SUCRALFATE 1 G: 1 TABLET ORAL at 08:38

## 2023-04-11 RX ADMIN — DICYCLOMINE HYDROCHLORIDE 10 MG: 10 CAPSULE ORAL at 08:32

## 2023-04-11 RX ADMIN — ACETAMINOPHEN 650 MG: 325 TABLET ORAL at 06:39

## 2023-04-11 RX ADMIN — GABAPENTIN 800 MG: 400 CAPSULE ORAL at 15:00

## 2023-04-11 RX ADMIN — GABAPENTIN 800 MG: 400 CAPSULE ORAL at 05:47

## 2023-04-11 RX ADMIN — PIPERACILLIN SODIUM AND TAZOBACTAM SODIUM 3.38 G: 3; .375 INJECTION, POWDER, LYOPHILIZED, FOR SOLUTION INTRAVENOUS at 20:14

## 2023-04-11 RX ADMIN — OXYCODONE HYDROCHLORIDE AND ACETAMINOPHEN 500 MG: 500 TABLET ORAL at 08:31

## 2023-04-11 RX ADMIN — PANTOPRAZOLE SODIUM 40 MG: 40 TABLET, DELAYED RELEASE ORAL at 05:47

## 2023-04-11 RX ADMIN — INSULIN LISPRO 14 UNITS: 100 INJECTION, SOLUTION INTRAVENOUS; SUBCUTANEOUS at 08:36

## 2023-04-11 RX ADMIN — ARIPIPRAZOLE 10 MG: 10 TABLET ORAL at 20:19

## 2023-04-11 RX ADMIN — DULOXETINE HYDROCHLORIDE 60 MG: 60 CAPSULE, DELAYED RELEASE ORAL at 20:19

## 2023-04-11 RX ADMIN — Medication 1 TABLET: at 08:31

## 2023-04-11 RX ADMIN — DULOXETINE HYDROCHLORIDE 60 MG: 60 CAPSULE, DELAYED RELEASE ORAL at 08:31

## 2023-04-11 RX ADMIN — APIXABAN 5 MG: 5 TABLET, FILM COATED ORAL at 08:32

## 2023-04-11 RX ADMIN — INSULIN LISPRO 18 UNITS: 100 INJECTION, SOLUTION INTRAVENOUS; SUBCUTANEOUS at 17:38

## 2023-04-11 RX ADMIN — GABAPENTIN 800 MG: 400 CAPSULE ORAL at 22:54

## 2023-04-11 RX ADMIN — GUAIFENESIN 1200 MG: 600 TABLET, EXTENDED RELEASE ORAL at 08:32

## 2023-04-11 RX ADMIN — BACLOFEN 30 MG: 10 TABLET ORAL at 08:31

## 2023-04-11 RX ADMIN — PIPERACILLIN SODIUM AND TAZOBACTAM SODIUM 3.38 G: 3; .375 INJECTION, POWDER, LYOPHILIZED, FOR SOLUTION INTRAVENOUS at 15:02

## 2023-04-11 RX ADMIN — MODAFINIL 200 MG: 100 TABLET ORAL at 10:00

## 2023-04-11 RX ADMIN — APIXABAN 5 MG: 5 TABLET, FILM COATED ORAL at 20:19

## 2023-04-11 RX ADMIN — BACLOFEN 30 MG: 10 TABLET ORAL at 17:36

## 2023-04-11 RX ADMIN — IOPAMIDOL 88 ML: 755 INJECTION, SOLUTION INTRAVENOUS at 13:51

## 2023-04-11 RX ADMIN — Medication 1 CAPSULE: at 08:32

## 2023-04-11 RX ADMIN — BACLOFEN 30 MG: 10 TABLET ORAL at 11:51

## 2023-04-11 RX ADMIN — FERROUS SULFATE TAB 325 MG (65 MG ELEMENTAL FE) 325 MG: 325 (65 FE) TAB at 20:19

## 2023-04-11 RX ADMIN — MEROPENEM 1 G: 1 INJECTION, POWDER, FOR SOLUTION INTRAVENOUS at 05:47

## 2023-04-11 RX ADMIN — Medication 10 ML: at 08:39

## 2023-04-11 NOTE — PLAN OF CARE
Goal Outcome Evaluation:  Plan of Care Reviewed With: patient        Progress: no change  Outcome Evaluation: Pt resting in bed during assessment. No complaints at this time. Vital signs stable, no acute changes at this time. Wound care completed. Will continue to monitor.

## 2023-04-11 NOTE — PROGRESS NOTES
[facility] HOSPITALIST PROGRESS NOTE    Subjective     History:   Ken Krueger is a 47 y.o. male admitted on 4/8/2023 secondary to Severe sepsis     Procedures: None    CC: Follow up resp failure, pneumonia    Patient seen and examined with SHAHNAZ Che. Awake and alert. Reports some mid thoracic back pain with pleuritic pain. States pain feels similar to previous PE's. No reported CP. Dyspnea overall improved. No reported nausea or vomiting. No acute events overnight per RN.     History taken from: patient, chart, and RN.      Objective     Vital Signs  Temp:  [97.5 °F (36.4 °C)-98.1 °F (36.7 °C)] 97.5 °F (36.4 °C)  Heart Rate:  [80-89] 80  Resp:  [16-18] 16  BP: ()/(59-82) 123/71    Intake/Output Summary (Last 24 hours) at 4/11/2023 1324  Last data filed at 4/11/2023 0832  Gross per 24 hour   Intake 1420 ml   Output 2150 ml   Net -730 ml         Physical Exam:  General:    Awake, more alert today, in no acute distress, obese   Heart:      Normal S1 and S2. Regular rate and rhythm. No significant murmur, rubs or gallops appreciated.   Lungs:     Respirations regular, even and unlabored. Lungs clear to auscultation B/L. No wheezes, rales or rhonchi.   Abdomen:   Soft and nontender. No guarding, rebound tenderness or  organomegaly noted. Bowel sounds present x 4. (+) ileostomy and urostomy.    Extremities:  No edema RLE. (+) left AKA.      Results Review:    Results from last 7 days   Lab Units 04/11/23  0210 04/10/23  0014 04/09/23 0528 04/08/23 2123   WBC 10*3/mm3 10.15 9.75 14.56* 20.74*   HEMOGLOBIN g/dL 11.5* 11.2* 11.5* 13.2   PLATELETS 10*3/mm3 362 305 290 457*     Results from last 7 days   Lab Units 04/11/23  0210 04/10/23  0014 04/09/23 0528 04/08/23 2123   SODIUM mmol/L 136 140 130* 132*   POTASSIUM mmol/L 3.9 3.6 4.5 3.7   CHLORIDE mmol/L 103 106 100 94*   CO2 mmol/L 20.9* 23.7 13.8* 19.2*   BUN mg/dL 35* 44* 60* 54*   CREATININE mg/dL 1.10 1.16 1.84* 1.87*   CALCIUM mg/dL 9.1 9.0  8.6 9.6   GLUCOSE mg/dL 220* 229* 361* 352*     Results from last 7 days   Lab Units 04/11/23  0210 04/10/23  0014 04/08/23 2123   BILIRUBIN mg/dL 0.2 0.3 0.4   ALK PHOS U/L 137* 123* 142*   AST (SGOT) U/L 32 37 33   ALT (SGPT) U/L 48* 42* 38     Results from last 7 days   Lab Units 04/11/23  0210 04/09/23  0528 04/08/23 2123   MAGNESIUM mg/dL 2.5 2.8* 2.4         Results from last 7 days   Lab Units 04/09/23  0024 04/08/23 2123   HSTROP T ng/L 69* 74*       Imaging Results (Last 24 Hours)     ** No results found for the last 24 hours. **            Medications:  Acidophilus/Pectin, 1 capsule, Oral, Daily  apixaban, 5 mg, Oral, Q12H  ARIPiprazole, 10 mg, Oral, Nightly  ascorbic acid, 500 mg, Oral, Daily  atorvastatin, 10 mg, Oral, Nightly  baclofen, 30 mg, Oral, 4x Daily With Meals & Nightly  carvedilol, 12.5 mg, Oral, BID With Meals  cholecalciferol, 1,000 Units, Oral, Daily  dicyclomine, 10 mg, Oral, BID  DULoxetine, 60 mg, Oral, BID  ferrous sulfate, 325 mg, Oral, BID  gabapentin, 800 mg, Oral, Q8H  guaiFENesin, 1,200 mg, Oral, Q12H  insulin glargine, 1-200 Units, Subcutaneous, Nightly - Glucommander  insulin lispro, 1-200 Units, Subcutaneous, 4x Daily With Meals & Nightly  lactulose, 10 g, Oral, TID  magnesium oxide, 1,200 mg, Oral, Daily  methenamine, 1 g, Oral, BID With Meals  modafinil, 200 mg, Oral, Daily  multivitamin, 1 tablet, Oral, Daily  multivitamin with minerals, 1 tablet, Oral, Daily  pantoprazole, 40 mg, Oral, Q AM  piperacillin-tazobactam, 3.375 g, Intravenous, Once  piperacillin-tazobactam, 3.375 g, Intravenous, Q8H  sodium chloride, 10 mL, Intravenous, Q12H  sodium hypochlorite, , Topical, BID  sucralfate, 1 g, Oral, Daily               Assessment & Plan   Severe sepsis with resp failure requiring BiPAP (present on admission): Likely 2/2 lingular pneumonia and possible UTI.  Blood cultures with NGTD. Urine culture reveals mixed sil. MRSA PCR is negative. Previously stopped Vanc. Deescalate  Merrem to Zosyn. Cont to follow cultures.     Acute on chronic hypercapnic respiratory failure: Likely 2/2 above. Improved with BiPAP. Cont treatment as outlined above. Cont CPAP.     JAKE: Likely prerenal vs ATN in the setting of sepsis. Improved and stable today after stopping IVF's. Monitor UOP and repeat labs in the AM.     Acute metabolic encephalopathy: Likely 2/2 above. Now improved. Cont treatment as outlined above and supportive treatment.     Anion gap metabolic acidosis: Likely 2/2 above with elevated lactate. No evidence of DKA. Lactate has normalized. Improved. Cont to monitor.     DM II, insulin dependent with hyperglycemia: HgbA1c in 2/23 11.5. Noncompliance thought to be contributing. Cont insulin regimen per SQ Glucommander Cont to monitor.     Chronic osteomyelitis with chronic associated wounds: Recently hospitalized for course of IV antibiotics. Appears stable at present. Wound care consulted.     Chronic combined systolic and diastolic CHF: Currently compensated. Cont to monitor volume status.     Essential HTN: BP stable. Cont Coreg. Cont to monitor.     ARLIN: Cont CPAP.     Paraplegia: S/P spinal cord injury. Cont supportive treatment.     Hx of PE: Pt reports recent noncompliance with Eliquis (states he ran out briefly) and reports pleuritic pain today. Order CT chest PE protocol. Cont Eliquis     Morbid obesity by BMI: Complicates all aspects of care.     DVT PPX: Eliquis    Pt is at high risk 2/2 severe sepsis with resp failure requiring BiPAP, pneumonia, acute on chronic resp failure, encephalopathy, JAKE, metabolic acidosis, hyperglycemia.       Disposition: Likely home with brother when medically stable, possibly 24-48 hours.     Javier Moscoso DO  04/11/23  13:24 EDT

## 2023-04-11 NOTE — PLAN OF CARE
Goal Outcome Evaluation:   Pt has rested well through out the day. Wound care done per order. New sand bed. Will continue to follow plan of care.

## 2023-04-12 ENCOUNTER — READMISSION MANAGEMENT (OUTPATIENT)
Dept: CALL CENTER | Facility: HOSPITAL | Age: 48
End: 2023-04-12
Payer: MEDICARE

## 2023-04-12 VITALS
TEMPERATURE: 98.3 F | WEIGHT: 292.11 LBS | OXYGEN SATURATION: 96 % | RESPIRATION RATE: 16 BRPM | HEIGHT: 71 IN | BODY MASS INDEX: 40.9 KG/M2 | DIASTOLIC BLOOD PRESSURE: 67 MMHG | HEART RATE: 82 BPM | SYSTOLIC BLOOD PRESSURE: 111 MMHG

## 2023-04-12 LAB
ANION GAP SERPL CALCULATED.3IONS-SCNC: 9.5 MMOL/L (ref 5–15)
BASOPHILS # BLD AUTO: 0.03 10*3/MM3 (ref 0–0.2)
BASOPHILS NFR BLD AUTO: 0.3 % (ref 0–1.5)
BUN SERPL-MCNC: 28 MG/DL (ref 6–20)
BUN/CREAT SERPL: 37.8 (ref 7–25)
CALCIUM SPEC-SCNC: 9.1 MG/DL (ref 8.6–10.5)
CHLORIDE SERPL-SCNC: 106 MMOL/L (ref 98–107)
CO2 SERPL-SCNC: 21.5 MMOL/L (ref 22–29)
CREAT SERPL-MCNC: 0.74 MG/DL (ref 0.76–1.27)
DEPRECATED RDW RBC AUTO: 45.1 FL (ref 37–54)
EGFRCR SERPLBLD CKD-EPI 2021: 113.9 ML/MIN/1.73
EOSINOPHIL # BLD AUTO: 0.17 10*3/MM3 (ref 0–0.4)
EOSINOPHIL NFR BLD AUTO: 1.9 % (ref 0.3–6.2)
ERYTHROCYTE [DISTWIDTH] IN BLOOD BY AUTOMATED COUNT: 14.8 % (ref 12.3–15.4)
GLUCOSE BLDC GLUCOMTR-MCNC: 180 MG/DL (ref 70–130)
GLUCOSE SERPL-MCNC: 227 MG/DL (ref 65–99)
HCT VFR BLD AUTO: 36.7 % (ref 37.5–51)
HGB BLD-MCNC: 10.7 G/DL (ref 13–17.7)
IMM GRANULOCYTES # BLD AUTO: 0.03 10*3/MM3 (ref 0–0.05)
IMM GRANULOCYTES NFR BLD AUTO: 0.3 % (ref 0–0.5)
LYMPHOCYTES # BLD AUTO: 1.63 10*3/MM3 (ref 0.7–3.1)
LYMPHOCYTES NFR BLD AUTO: 17.8 % (ref 19.6–45.3)
MCH RBC QN AUTO: 24.4 PG (ref 26.6–33)
MCHC RBC AUTO-ENTMCNC: 29.2 G/DL (ref 31.5–35.7)
MCV RBC AUTO: 83.8 FL (ref 79–97)
MONOCYTES # BLD AUTO: 0.32 10*3/MM3 (ref 0.1–0.9)
MONOCYTES NFR BLD AUTO: 3.5 % (ref 5–12)
NEUTROPHILS NFR BLD AUTO: 7 10*3/MM3 (ref 1.7–7)
NEUTROPHILS NFR BLD AUTO: 76.2 % (ref 42.7–76)
NRBC BLD AUTO-RTO: 0 /100 WBC (ref 0–0.2)
PLATELET # BLD AUTO: 340 10*3/MM3 (ref 140–450)
PMV BLD AUTO: 9.6 FL (ref 6–12)
POTASSIUM SERPL-SCNC: 4 MMOL/L (ref 3.5–5.2)
RBC # BLD AUTO: 4.38 10*6/MM3 (ref 4.14–5.8)
SODIUM SERPL-SCNC: 137 MMOL/L (ref 136–145)
WBC NRBC COR # BLD: 9.18 10*3/MM3 (ref 3.4–10.8)

## 2023-04-12 PROCEDURE — 25010000002 PIPERACILLIN SOD-TAZOBACTAM PER 1 G: Performed by: INTERNAL MEDICINE

## 2023-04-12 PROCEDURE — 99239 HOSP IP/OBS DSCHRG MGMT >30: CPT | Performed by: INTERNAL MEDICINE

## 2023-04-12 PROCEDURE — 82962 GLUCOSE BLOOD TEST: CPT

## 2023-04-12 PROCEDURE — 80048 BASIC METABOLIC PNL TOTAL CA: CPT | Performed by: INTERNAL MEDICINE

## 2023-04-12 PROCEDURE — 97162 PT EVAL MOD COMPLEX 30 MIN: CPT

## 2023-04-12 PROCEDURE — 85025 COMPLETE CBC W/AUTO DIFF WBC: CPT | Performed by: INTERNAL MEDICINE

## 2023-04-12 PROCEDURE — 63710000001 INSULIN LISPRO (HUMAN) PER 5 UNITS: Performed by: INTERNAL MEDICINE

## 2023-04-12 RX ORDER — DIPHENOXYLATE HYDROCHLORIDE AND ATROPINE SULFATE 2.5; .025 MG/1; MG/1
1 TABLET ORAL DAILY
Qty: 30 TABLET | Refills: 0 | Status: SHIPPED | OUTPATIENT
Start: 2023-04-12 | End: 2023-04-12

## 2023-04-12 RX ORDER — AMOXICILLIN AND CLAVULANATE POTASSIUM 875; 125 MG/1; MG/1
1 TABLET, FILM COATED ORAL 2 TIMES DAILY
Qty: 4 TABLET | Refills: 0 | Status: SHIPPED | OUTPATIENT
Start: 2023-04-12 | End: 2023-04-14

## 2023-04-12 RX ORDER — BUMETANIDE 2 MG/1
2 TABLET ORAL DAILY
Start: 2023-04-12

## 2023-04-12 RX ORDER — LACTULOSE 10 G/15ML
10 SOLUTION ORAL 3 TIMES DAILY
Qty: 946 ML | Refills: 0 | Status: SHIPPED | OUTPATIENT
Start: 2023-04-12

## 2023-04-12 RX ORDER — GUAIFENESIN 600 MG/1
1200 TABLET, EXTENDED RELEASE ORAL EVERY 12 HOURS SCHEDULED
Qty: 20 TABLET | Refills: 0 | Status: SHIPPED | OUTPATIENT
Start: 2023-04-12 | End: 2023-04-17

## 2023-04-12 RX ADMIN — MODAFINIL 200 MG: 100 TABLET ORAL at 08:38

## 2023-04-12 RX ADMIN — DAKIN'S SOLUTION 0.125% (QUARTER STRENGTH): 0.12 SOLUTION at 08:40

## 2023-04-12 RX ADMIN — MAGNESIUM GLUCONATE 500 MG ORAL TABLET 1200 MG: 500 TABLET ORAL at 08:38

## 2023-04-12 RX ADMIN — OXYCODONE HYDROCHLORIDE AND ACETAMINOPHEN 500 MG: 500 TABLET ORAL at 08:38

## 2023-04-12 RX ADMIN — APIXABAN 5 MG: 5 TABLET, FILM COATED ORAL at 08:39

## 2023-04-12 RX ADMIN — METHENAMINE HIPPURATE 1 G: 1 TABLET ORAL at 08:38

## 2023-04-12 RX ADMIN — LACTULOSE 10 G: 20 SOLUTION ORAL at 08:37

## 2023-04-12 RX ADMIN — Medication 10 ML: at 08:40

## 2023-04-12 RX ADMIN — GABAPENTIN 800 MG: 400 CAPSULE ORAL at 05:44

## 2023-04-12 RX ADMIN — BACLOFEN 30 MG: 10 TABLET ORAL at 08:38

## 2023-04-12 RX ADMIN — CARVEDILOL 12.5 MG: 6.25 TABLET, FILM COATED ORAL at 08:38

## 2023-04-12 RX ADMIN — GUAIFENESIN 1200 MG: 600 TABLET, EXTENDED RELEASE ORAL at 08:38

## 2023-04-12 RX ADMIN — VITAMINS A AND D OINTMENT 1 APPLICATION: 15.5; 53.4 OINTMENT TOPICAL at 08:40

## 2023-04-12 RX ADMIN — Medication 1000 UNITS: at 08:38

## 2023-04-12 RX ADMIN — FERROUS SULFATE TAB 325 MG (65 MG ELEMENTAL FE) 325 MG: 325 (65 FE) TAB at 08:39

## 2023-04-12 RX ADMIN — Medication 1 CAPSULE: at 08:38

## 2023-04-12 RX ADMIN — DULOXETINE HYDROCHLORIDE 60 MG: 60 CAPSULE, DELAYED RELEASE ORAL at 08:38

## 2023-04-12 RX ADMIN — PIPERACILLIN SODIUM AND TAZOBACTAM SODIUM 3.38 G: 3; .375 INJECTION, POWDER, LYOPHILIZED, FOR SOLUTION INTRAVENOUS at 04:08

## 2023-04-12 RX ADMIN — PANTOPRAZOLE SODIUM 40 MG: 40 TABLET, DELAYED RELEASE ORAL at 05:44

## 2023-04-12 RX ADMIN — Medication 1 TABLET: at 08:38

## 2023-04-12 RX ADMIN — INSULIN LISPRO 16 UNITS: 100 INJECTION, SOLUTION INTRAVENOUS; SUBCUTANEOUS at 07:37

## 2023-04-12 NOTE — PLAN OF CARE
Goal Outcome Evaluation:           Progress: no change  Outcome Evaluation: Pt resting in bed at this time. Would care done per orders, no acute changes or concerns at this time.

## 2023-04-12 NOTE — DISCHARGE SUMMARY
[facility] DISCHARGE SUMMARY      Date of Admission: 4/8/2023    Date of Discharge: 4/12/2023     PCP: Franchesca Hodges APRN    Admission Diagnosis:   Please see admission H&P    Discharge Diagnosis:  Severe sepsis  Lingular pneumonia  Complicated UTI 2/2 azevedo catheter and ileal conduit   Acute on chronic hypercapnic respiratory failure  JAKE  Acute metabolic encephalopathy with component of hepatic encephalopathy  Anion gap metabolic acidosis  DM II, insulin dependent with hyperglycemia  Chronic osteomyelitis with chronic associated wounds  Chronic combined systolic and diastolic CHF  Essential HTN  ARLIN  Paraplegia  Hx of PE  Morbid obesity by BMI    Procedures Performed: None     Consults:   Consults     No orders found from 3/10/2023 to 4/9/2023.            History of Present Illness:  Ken Krueger is a 47 y.o. male who presented to [facility] ED for evaluation of progressive lethargy and confusion. Please see admission H&P for complete details.     In the ED, workup revealed severe sepsis, lingular pneumonia, suspected UTI, acute on chronic hypercapnic respiratory failure, JAKE, anion gap metabolic acidosis and acute encephalopathy. Cultures were obtained and IV antibiotics initiated. He was placed on BiPAP.      Hospital Course  Ken Krueger was admitted to the CCU for further evaluation and treatment. He was continued on broad spectrum IV antibiotics including Vanc and Merrem. He was placed on rectal lactulose. BiPAP was continued on admission. Any potential nephrotoxic agents were held on admission.     Workup was negative for DKA with his metabolic acidosis thought to be 2/2 his JAKE and sepsis with elevated lactate. Recent HgbA1c in 2/23 was 11.5 with noncompliance thought to be contributing. He was placed on insulin regimen per SQ Glucommander. Blood cultures were followed and remained negative. MRSA PCR was negative. Urine culture revealed mixed sil. With the outlined treatment his encephalopathy  began resolving. He was transitioned off BiPAP back to his home CPAP. Vanc and D/C'd and Merrem later deescalated to Zosyn. His JAKE resolved and metabolic acidosis improved. IVF's were stopped and renal function remained stable. Wound care was consulted with wound care per their recs and plans for outpatient follow up in wound care clinic with no signs of active infection.     Once he clinically improved he was transferred out of the CCU to the med/surg floor. He reported some pleuritic pain with reported recent noncompliance with his home Eliquis (states he ran out briefly before getting a new prescription). He stated these symptoms were similar to when he was previously diagnosed with PE. A CT chest was obtained with no evidence of PE. His pain was thought to be related to his pneumonia and improved by the time of discharge.     Mr. Krueger was seen and examined on 4/12. He remained afebrile and hemodynamically stable with stable AM labs. Possible SNF had been discussed but he preferred to return home with his brother. He felt improved from upon admission and deemed medically stable for discharge. He was prescribed a course of Augmentin to complete a course of treatment. His renal function remained stable and he was continued on his HF regimen that had initially been held on admission. His home Bumex was reduced from BID to daily dosing. Instructions were given for follow up with his PCP, HF clinic and would care clinic.     Condition on Discharge:  Stable    Vital Signs  Vitals:    04/12/23 0600   BP: 111/67   Pulse: 82   Resp: 16   Temp: 98.3 °F (36.8 °C)   SpO2:        Physical Exam:  General:    Awake, alert, in no acute distress, obese   Heart:      Normal S1 and S2. Regular rate and rhythm. No significant murmur, rubs or gallops appreciated.   Lungs:     Respirations regular, even and unlabored. Lungs clear to auscultation B/L. No wheezes, rales or rhonchi.   Abdomen:   Soft and nontender. No guarding, rebound  tenderness or  organomegaly noted. Bowel sounds present x 4. (+) ileostomy and urostomy.    Extremities:  No edema RLE. (+) left AKA.      Discharge Disposition:   home      Discharge Medications:     Discharge Medications      New Medications      Instructions Start Date   amoxicillin-clavulanate 875-125 MG per tablet  Commonly known as: Augmentin   1 tablet, Oral, 2 Times Daily      guaiFENesin 600 MG 12 hr tablet  Commonly known as: MUCINEX   1,200 mg, Oral, Every 12 Hours Scheduled      lactulose 10 GM/15ML solution  Commonly known as: CHRONULAC   10 g, Oral, 3 Times Daily      multivitamin tablet tablet   1 tablet, Oral, Daily         Changes to Medications      Instructions Start Date   bumetanide 2 MG tablet  Commonly known as: BUMEX  What changed: when to take this   2 mg, Oral, Daily         Continue These Medications      Instructions Start Date   apixaban 5 MG tablet tablet  Commonly known as: ELIQUIS   5 mg, Oral, 2 Times Daily, Prior to [facility] Admission, Patient was on: taking for blood clots       ARIPiprazole 10 MG tablet  Commonly known as: ABILIFY   10 mg, Oral, Nightly      ascorbic acid 500 MG tablet  Commonly known as: VITAMIN C   500 mg, Oral, Daily      atorvastatin 10 MG tablet  Commonly known as: LIPITOR   10 mg, Oral, Nightly      baclofen 20 MG tablet  Commonly known as: LIORESAL   30 mg, Oral, 4 Times Daily With Meals & Nightly      carvedilol 12.5 MG tablet  Commonly known as: Coreg   12.5 mg, Oral, 2 Times Daily With Meals      cholecalciferol 25 MCG (1000 UT) tablet  Commonly known as: VITAMIN D3   1,000 Units, Oral, Daily      dicyclomine 10 MG capsule  Commonly known as: BENTYL   10 mg, Oral, 2 Times Daily      DULoxetine 60 MG capsule  Commonly known as: CYMBALTA   60 mg, Oral, 2 Times Daily      Entresto 24-26 MG tablet  Generic drug: sacubitril-valsartan   1 tablet, Oral, 2 Times Daily      ferrous sulfate 325 (65 FE) MG tablet   325 mg, Oral, 2 Times Daily      gabapentin 800 MG  tablet  Commonly known as: NEURONTIN   800 mg, Oral, 3 Times Daily      hydrocortisone-bacitracin-zinc oxide-nystatin  Commonly known as: MAGIC BARRIER   1 application, Topical, As Needed      Jardiance 10 MG tablet tablet  Generic drug: empagliflozin   10 mg, Oral, Daily      Levemir FlexPen 100 UNIT/ML injection  Generic drug: insulin detemir   50 Units, Subcutaneous, 2 Times Daily      magnesium oxide 400 MG tablet  Commonly known as: MAG-OX   1,200 mg, Oral, Daily      metFORMIN 1000 MG tablet  Commonly known as: GLUCOPHAGE   1,000 mg, Oral, 2 Times Daily With Meals      methenamine 1 g tablet  Commonly known as: HIPREX   1 g, Oral, 2 Times Daily With Meals      metOLazone 2.5 MG tablet  Commonly known as: ZAROXOLYN   2.5 mg, Oral, 3 Times Weekly      modafinil 200 MG tablet  Commonly known as: PROVIGIL   200 mg, Oral, Daily      multivitamin with minerals tablet tablet   1 tablet, Oral, Daily      NovoLOG FlexPen 100 UNIT/ML solution pen-injector sc pen  Generic drug: insulin aspart   Inject 25 Units under the skin into the appropriate area as directed 3 (Three) Times a Day With Meals for 30 days. Please hold if not eating meal.      omeprazole 40 MG capsule  Commonly known as: priLOSEC   40 mg, Oral, 2 Times Daily      Risaquad-2 capsule capsule   1 capsule, Oral, Daily      spironolactone 25 MG tablet  Commonly known as: ALDACTONE   25 mg, Oral, Daily      sucralfate 1 g tablet  Commonly known as: CARAFATE   1 g, Oral, Daily      Verquvo 2.5 MG tablet  Generic drug: Vericiguat   2.5 mg, Oral, Daily               Discharge Diet:   Dietary Orders (From admission, onward)     Start     Ordered    04/10/23 1239  DIET MESSAGE Extra protein on trays  Once        Comments: Extra protein on trays    04/10/23 1239    04/09/23 1041  Diet: Cardiac Diets, Diabetic Diets; Healthy Heart (2-3 Na+); Consistent Carbohydrate; Texture: Regular Texture (IDDSI 7); Fluid Consistency: Thin (IDDSI 0)  Diet Effective Now         References:    Diet Order [place]   Question Answer Comment   Diets: Cardiac Diets    Diets: Diabetic Diets    Cardiac Diet: Healthy Heart (2-3 Na+)    Diabetic Diet: Consistent Carbohydrate    Texture: Regular Texture (IDDSI 7)    Fluid Consistency: Thin (IDDSI 0)        04/09/23 1041                Activity at Discharge:  activity as tolerated    Follow-up Appointments:  Additional Instructions for the Follow-ups that You Need to Schedule     Ambulatory Referral to Home Health   As directed      Face to Face Visit Date: 4/12/2023    Follow-up provider for Plan of Care?: I treated the patient in an acute care facility and will not continue treatment after discharge.    Follow-up provider: FRANCHESCA HERNANDEZ [[id number]]    Reason/Clinical Findings: Chronic wounds    Describe mobility limitations that make leaving home difficult: Paraplegia    Nursing/Therapeutic Services Requested: Skilled Nursing Physical Therapy Occupational Therapy    Skilled nursing orders: Medication education Wound care dressing/changes Cardiopulmonary assessments CHF management    Frequency: 1 Week 1         Discharge Follow-up with PCP   As directed       Currently Documented PCP:    Franchesca Hernandez APRN    PCP Phone Number:    [phone]     Follow Up Details: Follow up with GUANACO Robles in 1 week.         Discharge Follow-up with Specified Provider: Follow up with wound care clinic in 2 weeks.   As directed      To: Follow up with wound care clinic in 2 weeks.            Follow-up Information     Franchesca Hernandez APRN .    Specialty: Nurse Practitioner  Why: Follow up with GUANACO Robles in 1 week.  Contact information:  [address] [facility] [address]  [phone]             [facility] .    Specialty: Cardiology  Contact information:  [address] [place] [id number] [phone]                     Your Scheduled Appointments    Apr 12, 2023  9:30 AM  New Patient with  GUANACO Finley  [facility] INFECTIOUS DISEASES ([place]) [address] [facility] [address]  [facility] [address] [phone]   Bring all previous medical records and films, along with current medications and insurance information.              Test Results Pending at Discharge:  Pending Labs     Order Current Status    Beta-Hydroxybutyrate In process    Blood Culture - Blood, Arm, Right Preliminary result    Blood Culture - Blood, Hand, Left Preliminary result    Respiratory Culture - Sputum, Cough Preliminary result           The ASCVD Risk score (Joe VALENCIA, et al., 2019) failed to calculate for the following reasons:    The patient has a prior MI or stroke diagnosis      Javier Moscoso DO  04/12/23  08:20 EDT      Time: Greater than 30 minutes spent on this discharge.
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
## 2023-04-12 NOTE — PAYOR COMM NOTE
"[facility]  NPI:[id number]    Utilization Review  Contact: Lexus Vieira RN  Phone: [phone]  Fax:[phone]    DISCHARGE NOTIFICATION      [id number]      Ken Deng (47 y.o. Male)     Date of Birth   1975    Social Security Number       Address   [address] [address]    Home Phone   [phone]    MRN   [id number]       Mormonism   Confucianism    Marital Status                               Admission Date   4/8/23    Admission Type   Emergency    Admitting Provider   Cathy Burrell DO    Attending Provider       Department, Room/Bed   [address], [address]/       Discharge Date   4/12/2023    Discharge Disposition   Home or Self Care    Discharge Destination                               Attending Provider: (none)   Allergies: Keflex [Cephalexin]    Isolation: None   Infection: None   Code Status: CPR    Ht: 180.3 cm (71\")   Wt: 132 kg (292 lb 1.8 oz)    Admission Cmt: None   Principal Problem: Severe sepsis [A41.9,R65.20]                 Active Insurance as of 4/8/2023     Primary Coverage     Payor Plan Insurance Group Employer/Plan Group    [employer] [place] MEDICARE REPLACEMENT WELL[employer]ARE MEDICARE REPLACEMENT      Payor Plan Address Payor Plan Phone Number Payor Plan Fax Number Effective Dates    [address] [address] [phone]  5/1/2021 - None Entered    [facility] [address]       Subscriber Name Subscriber Birth Date Member ID       KEN DENG 1975 [id number]           Secondary Coverage     Payor Plan Insurance Group Employer/Plan Group    [place]      Payor Plan Address Payor Plan Phone Number Payor Plan Fax Number Effective Dates    [address] [phone]  7/13/2019 - None Entered    [place] [address]       Subscriber Name Subscriber Birth Date Member ID       KEN DENG 1975 [id number]                 Emergency Contacts      (Rel.) Home Phone Work Phone Mobile Phone    " Pineda Krueger (Power of ) [phone] -- --        Alycia Reed MSW     Case Management  Case Management/Social Work      Signed  Date of Service:  04/12/23 0915  Creation Time:  04/12/23 0915     Signed          Discharge Planning Assessment   [place]     Patient Name: Ken Krueger               MRN: [id number]  Today's Date: 4/12/2023                     Admit Date: 4/8/2023            Discharge Plan      Row Name 04/12/23 0914           Plan     Final Note Pt is being discharged home today. Pt utilized [facility]-home health prior to admission. SS notified [facility] per Mihaela and faxed clinicals. RN to call report to [facility]. No other needs identified.     Row Name 04/12/23 0908           Plan     Final Discharge Disposition Code 06 - home with home health care                        Javier Moscoso DO   Physician  Hospitalist  Discharge Summary      Incomplete  Date of Service:  04/12/23 0820  Creation Time:  04/12/23 0820     Incomplete               [facility]        Date of Admission: 4/8/2023     Date of Discharge:       PCP: Franchesca Hodges APRN     Admission Diagnosis: Severe sepsis     Discharge Diagnosis:         Procedures Performed:       Consults:       Consults      No orders found from 3/10/2023 to 4/9/2023.                History of Present Illness:  Ken Krueger is a 47 y.o. male who presented to [facility] ED with CC of       Chief Complaint   Patient presents with   • Wound Infection   .         Hospital Course  Ken Krueger was admitted to the  for further evaluation and treatment.         Pertinent Test Results:      Condition on Discharge:       Vital Signs      Vitals:     04/12/23 0600   BP: 111/67   Pulse: 82   Resp: 16   Temp: 98.3 °F (36.8 °C)   SpO2:           Physical Exam:  General:    Awake, alert, in no acute distress   Heart:      Normal S1 and S2. Regular rate and rhythm. No significant murmur, rubs  or gallops appreciated.   Lungs:     Respirations regular, even and unlabored. Lungs clear to auscultation B/L. No wheezes, rales or rhonchi.   Abdomen:   Soft and nontender. No guarding, rebound tenderness or  organomegaly noted. Bowel sounds present x 4.   Extremities:  No clubbing, cyanosis or edema noted. Moves UE and LE equally B/L.      Discharge Disposition:           Discharge Medications:           Discharge Medications            New Medications      Instructions Start Date   amoxicillin-clavulanate 875-125 MG per tablet  Commonly known as: Augmentin    1 tablet, Oral, 2 Times Daily        guaiFENesin 600 MG 12 hr tablet  Commonly known as: MUCINEX    1,200 mg, Oral, Every 12 Hours Scheduled        lactulose 10 GM/15ML solution  Commonly known as: CHRONULAC    10 g, Oral, 3 Times Daily        multivitamin tablet tablet    1 tablet, Oral, Daily                      Changes to Medications      Instructions Start Date   bumetanide 2 MG tablet  Commonly known as: BUMEX  What changed: when to take this    2 mg, Oral, Daily                      Continue These Medications      Instructions Start Date   apixaban 5 MG tablet tablet  Commonly known as: ELIQUIS    5 mg, Oral, 2 Times Daily, Prior to [facility] Admission, Patient was on: taking for blood clots         ARIPiprazole 10 MG tablet  Commonly known as: ABILIFY    10 mg, Oral, Nightly        ascorbic acid 500 MG tablet  Commonly known as: VITAMIN C    500 mg, Oral, Daily        atorvastatin 10 MG tablet  Commonly known as: LIPITOR    10 mg, Oral, Nightly        baclofen 20 MG tablet  Commonly known as: LIORESAL    30 mg, Oral, 4 Times Daily With Meals & Nightly        carvedilol 12.5 MG tablet  Commonly known as: Coreg    12.5 mg, Oral, 2 Times Daily With Meals        cholecalciferol 25 MCG (1000 UT) tablet  Commonly known as: VITAMIN D3    1,000 Units, Oral, Daily        dicyclomine 10 MG capsule  Commonly known as: BENTYL    10 mg, Oral, 2 Times Daily         DULoxetine 60 MG capsule  Commonly known as: CYMBALTA    60 mg, Oral, 2 Times Daily        Entresto 24-26 MG tablet  Generic drug: sacubitril-valsartan    1 tablet, Oral, 2 Times Daily        ferrous sulfate 325 (65 FE) MG tablet    325 mg, Oral, 2 Times Daily        gabapentin 800 MG tablet  Commonly known as: NEURONTIN    800 mg, Oral, 3 Times Daily        hydrocortisone-bacitracin-zinc oxide-nystatin  Commonly known as: MAGIC BARRIER    1 application, Topical, As Needed        Jardiance 10 MG tablet tablet  Generic drug: empagliflozin    10 mg, Oral, Daily        Levemir FlexPen 100 UNIT/ML injection  Generic drug: insulin detemir    50 Units, Subcutaneous, 2 Times Daily        magnesium oxide 400 MG tablet  Commonly known as: MAG-OX    1,200 mg, Oral, Daily        metFORMIN 1000 MG tablet  Commonly known as: GLUCOPHAGE    1,000 mg, Oral, 2 Times Daily With Meals        methenamine 1 g tablet  Commonly known as: HIPREX    1 g, Oral, 2 Times Daily With Meals        metOLazone 2.5 MG tablet  Commonly known as: ZAROXOLYN    2.5 mg, Oral, 3 Times Weekly        modafinil 200 MG tablet  Commonly known as: PROVIGIL    200 mg, Oral, Daily        multivitamin with minerals tablet tablet    1 tablet, Oral, Daily        NovoLOG FlexPen 100 UNIT/ML solution pen-injector sc pen  Generic drug: insulin aspart    Inject 25 Units under the skin into the appropriate area as directed 3 (Three) Times a Day With Meals for 30 days. Please hold if not eating meal.        omeprazole 40 MG capsule  Commonly known as: priLOSEC    40 mg, Oral, 2 Times Daily        Risaquad-2 capsule capsule    1 capsule, Oral, Daily        spironolactone 25 MG tablet  Commonly known as: ALDACTONE    25 mg, Oral, Daily        sucralfate 1 g tablet  Commonly known as: CARAFATE    1 g, Oral, Daily        Verquvo 2.5 MG tablet  Generic drug: Vericiguat    2.5 mg, Oral, Daily                     Discharge Diet:         Dietary Orders (From admission, onward)        Start     Ordered     04/10/23 1239   DIET MESSAGE Extra protein on trays  Once        Comments: Extra protein on trays    04/10/23 1239     04/09/23 1041   Diet: Cardiac Diets, Diabetic Diets; Healthy Heart (2-3 Na+); Consistent Carbohydrate; Texture: Regular Texture (IDDSI 7); Fluid Consistency: Thin (IDDSI 0)  Diet Effective Now        References:    Diet Order [place]   Question Answer Comment   Diets: Cardiac Diets     Diets: Diabetic Diets     Cardiac Diet: Healthy Heart (2-3 Na+)     Diabetic Diet: Consistent Carbohydrate     Texture: Regular Texture (IDDSI 7)     Fluid Consistency: Thin (IDDSI 0)         04/09/23 1041                    Activity at Discharge:       Follow-up Appointments:       Additional Instructions for the Follow-ups that You Need to Schedule      Ambulatory Referral to Home Health   As directed        Face to Face Visit Date: 4/12/2023    Follow-up provider for Plan of Care?: I treated the patient in an acute care facility and will not continue treatment after discharge.    Follow-up provider: FRANCHESCA HERNANDEZ [[id number]]    Reason/Clinical Findings: Chronic wounds    Describe mobility limitations that make leaving home difficult: Paraplegia    Nursing/Therapeutic Services Requested: Skilled Nursing Physical Therapy Occupational Therapy    Skilled nursing orders: Medication education Wound care dressing/changes Cardiopulmonary assessments CHF management    Frequency: 1 Week 1           Discharge Follow-up with PCP   As directed         Currently Documented PCP:    Franchesca Hernandez APRN    PCP Phone Number:    [phone]      Follow Up Details: Follow up with GUANACO Robles in 1 week.           Discharge Follow-up with Specified Provider: Follow up with wound care clinic in 2 weeks.   As directed        To: Follow up with wound care clinic in 2 weeks.                       Follow-up Information      Franchesca Hernandez APRN .    Specialty: Nurse Practitioner  Why: Follow up with  GUANACO Robles in 1 week.  Contact information:  [address] [facility] [address]  [phone]                 [facility] .    Specialty: Cardiology  Contact information:  [address] [place] [address] [phone]                               Your Scheduled Appointments          Apr 12, 2023  9:30 AM  New Patient with GUANACO Finley  [facility] ([place]) [address] [address] [phone]    Bring all previous medical records and films, along with current medications and insurance information.                   Test Results Pending at Discharge:       Pending Labs      Order Current Status     Beta-Hydroxybutyrate In process     Blood Culture - Blood, Arm, Right Preliminary result     Blood Culture - Blood, Hand, Left Preliminary result     Respiratory Culture - Sputum, Cough Preliminary result             The ASCVD Risk score (Joe VALENCIA, et al., 2019) failed to calculate for the following reasons:    The patient has a prior MI or stroke diagnosis      Javier Moscoso DO  04/12/23  08:20 EDT        Time: Greater than 30 minutes spent on this discharge.

## 2023-04-12 NOTE — CASE MANAGEMENT/SOCIAL WORK
Discharge Planning Assessment   [place]     Patient Name: Ken Krueger  MRN: [id number]  Today's Date: 4/12/2023    Admit Date: 4/8/2023     Discharge Plan     Row Name 04/12/23 0914       Plan    Final Note Pt is being discharged home today. Pt utilized [facility]-home health prior to admission. SS notified [facility] per Mihaela and faxed clinicals. RN to call report to [facility]. No other needs identified.    Row Name 04/12/23 0908       Plan    Final Discharge Disposition Code 06 - home with home health care              Continued Care and Services - Admitted Since 4/8/2023     Home Medical Care     Service Provider Request Status Selected Services Address Phone Fax Patient Preferred    [facility] [place] [facility] N[facility]A [address] [facility] [address] [phone] --              Expected Discharge Date and Time     Expected Discharge Date Expected Discharge Time    Apr 12, 2023         BRENT Burns

## 2023-04-12 NOTE — DISCHARGE INSTR - LAB
Wound Care twice a day    Coccyx, right gluteal, left gluteal: Gently pack with 2inch Kerlex moistened with dakins and cover with mepilex  Right Ankle and Heels: paint with betadine twice a day

## 2023-04-12 NOTE — DISCHARGE INSTR - ACTIVITY
Activity as Tolerated    Wound care:     Coccyx, right gluteal, left gluteal- gently pack with 2 inch kerlex moistened with dakins and cover with mepilex twice a day.  Right Ankle and Bilateral Heels- paint with Betadine

## 2023-04-13 LAB
B-OH-BUTYR SERPL-MCNC: 0.9 MG/DL
BACTERIA SPEC AEROBE CULT: NORMAL
BACTERIA SPEC AEROBE CULT: NORMAL
BACTERIA SPEC RESP CULT: NORMAL
GRAM STN SPEC: NORMAL

## 2023-04-13 NOTE — OUTREACH NOTE
Prep Survey    Flowsheet Row Responses   [facility] patient discharged from? [place]   Is LACE score < 7 ? No   Eligibility Readm Mgmt   Discharge diagnosis Severe sepsis   Does the patient have one of the following disease processes/diagnoses(primary or secondary)? Sepsis   Does the patient have Home health ordered? Yes   What is the Home health agency?  [facility]   Is there a DME ordered? No   Prep survey completed? Yes          Michelle DALE - Registered Nurse

## 2023-04-14 ENCOUNTER — READMISSION MANAGEMENT (OUTPATIENT)
Dept: CALL CENTER | Facility: HOSPITAL | Age: 48
End: 2023-04-14
Payer: MEDICARE

## 2023-04-14 NOTE — OUTREACH NOTE
Sepsis Week 1 Survey    Flowsheet Row Responses   [facility] patient discharged from? [facility]   Does the patient have one of the following disease processes/diagnoses(primary or secondary)? Sepsis   Week 1 attempt successful? Yes   Call start time 1606   Call end time 1608   Discharge diagnosis Severe sepsis   Meds reviewed with patient/caregiver? Yes   Is the patient having any side effects they believe may be caused by any medication additions or changes? No   Does the patient have all medications related to this admission filled (includes all antibiotics, inhalers, nebulizers,steroids,etc.) Yes   Is the patient taking all medications as directed (includes completed medication regime)? Yes   Does the patient have a primary care provider?  Yes   Comments regarding PCP PCP comes to his home and is scheduled to come next week.   Does the patient have an appointment with their PCP within 7 days of discharge? Greater than 7 days   What is preventing the patient from scheduling follow up appointments within 7 days of discharge? Earlier appointment not available   Nursing Interventions Verified appointment date/time/provider   Has the patient kept scheduled appointments due by today? N/A   What is the Home health agency?  [facility]   Has home health visited the patient within 72 hours of discharge? Yes   Psychosocial issues? No   Did the patient receive a copy of their discharge instructions? Yes   Nursing interventions Reviewed instructions with patient   What is the patient's perception of their health status since discharge? Improving   Nursing interventions Nurse provided patient education   Is the patient/caregiver able to teach back TIME? T emperature - higher or lower than normal, I nfection - may have signs and symptoms of an infection, M ental Decline - confused, sleepy, difficult to arouse, E xtremely Ill - severe pain, discomfort, shortness of breath   Nursing interventions Nurse provided  reassurance to patient   Is patient/caregiver able to teach back steps to recovery at home? Set small, achievable goals for return to baseline health, Rest and regain strength, Make a list of questions for PCP appoinment   Is the patient/caregiver able to teach back signs and symptoms of worsening condition: Fever, Hyperthermia, Rapid heart rate (>90), Shortness of breath/rapid respiratory rate, Altered mental status(confusion/coma), Edema, High blood glucose without diabetes   If the patient is a current smoker, are they able to teach back resources for cessation? Not a smoker   Is the patient/caregiver able to teach back the hierarchy of who to call/visit for symptoms/problems? PCP, Specialist, Home health nurse, Urgent Care, ED, 911 Yes   Week 1 call completed? Yes          Munira DALE - Licensed Nurse

## 2023-04-24 ENCOUNTER — READMISSION MANAGEMENT (OUTPATIENT)
Dept: CALL CENTER | Facility: HOSPITAL | Age: 48
End: 2023-04-24
Payer: MEDICARE

## 2023-04-24 NOTE — OUTREACH NOTE
Sepsis Week 2 Survey    Flowsheet Row Responses   [facility] patient discharged from? [facility]   Does the patient have one of the following disease processes/diagnoses(primary or secondary)? Sepsis   Week 2 attempt successful? Yes   Call start time 1414   Call end time 1419   Discharge diagnosis Severe sepsis   Meds reviewed with patient/caregiver? Yes   Is the patient having any side effects they believe may be caused by any medication additions or changes? No   Does the patient have all medications related to this admission filled (includes all antibiotics, inhalers, nebulizers,steroids,etc.) Yes   Is the patient taking all medications as directed (includes completed medication regime)? Yes   Does the patient have a primary care provider?  Yes   Does the patient have an appointment with their PCP within 7 days of discharge? Greater than 7 days   Nursing Interventions Verified appointment date/time/provider   Has the patient kept scheduled appointments due by today? Yes  [had home visit from PCP on 4/20/23]   What is the Home health agency?  [facility]   Has home health visited the patient within 72 hours of discharge? Yes   Psychosocial issues? No   Did the patient receive a copy of their discharge instructions? Yes   Nursing interventions Reviewed instructions with patient   What is the patient's perception of their health status since discharge? Improving   Nursing interventions Nurse provided patient education   Is the patient/caregiver able to teach back TIME? T emperature - higher or lower than normal, I nfection - may have signs and symptoms of an infection, M ental Decline - confused, sleepy, difficult to arouse, E xtremely Ill - severe pain, discomfort, shortness of breath   Nursing interventions Nurse provided reassurance to patient, Nurse provided patient education   Is patient/caregiver able to teach back steps to recovery at home? Set small, achievable goals for return to baseline health,  Rest and regain strength, Eat a balanced diet   Is the patient/caregiver able to teach back signs and symptoms of worsening condition: Fever, Hyperthermia, Rapid heart rate (>90), Shortness of breath/rapid respiratory rate, Altered mental status(confusion/coma), Edema   Is the patient/caregiver able to teach back the hierarchy of who to call/visit for symptoms/problems? PCP, Specialist, Home health nurse, Urgent Care, ED, 911 Yes   Additional teach back comments states has yeast-like blisters on right axilla extending to shoulder, had home PCP visit last week, but did not show PCP, plans to call PCP and also has HH appt and wound care appt on 4/27/23 and plans to discuss then   Week 2 call completed? Yes          Janie DALE - Registered Nurse

## 2023-04-27 ENCOUNTER — HOSPITAL ENCOUNTER (OUTPATIENT)
Dept: WOUND CARE | Facility: HOSPITAL | Age: 48
Discharge: HOME | End: 2023-04-27
Payer: MEDICARE

## 2023-04-27 VITALS
TEMPERATURE: 98.3 F | DIASTOLIC BLOOD PRESSURE: 73 MMHG | RESPIRATION RATE: 17 BRPM | SYSTOLIC BLOOD PRESSURE: 127 MMHG | HEART RATE: 85 BPM

## 2023-04-27 DIAGNOSIS — L89.002 PRESSURE INJURY OF ELBOW, STAGE 2, UNSPECIFIED LATERALITY: Primary | ICD-10-CM

## 2023-04-27 DIAGNOSIS — L89.310 PRESSURE INJURY OF RIGHT BUTTOCK, UNSTAGEABLE: ICD-10-CM

## 2023-04-27 PROCEDURE — 63710000001 ALUMINUM-MAGNESIUM HYDROXIDE-SIMETHICONE 200-200-20 MG/5ML SUSPENSION: Performed by: NURSE PRACTITIONER

## 2023-04-27 PROCEDURE — 63710000001 VITAMIN A & D OINTMENT

## 2023-04-27 PROCEDURE — A9270 NON-COVERED ITEM OR SERVICE: HCPCS

## 2023-04-27 PROCEDURE — A9270 NON-COVERED ITEM OR SERVICE: HCPCS | Performed by: NURSE PRACTITIONER

## 2023-04-27 PROCEDURE — 63710000001 CLOTRIMAZOLE 1 % CREAM 30 G TUBE: Performed by: NURSE PRACTITIONER

## 2023-04-27 PROCEDURE — 63710000001 ZINC OXIDE 20 % OINTMENT 454 G JAR: Performed by: NURSE PRACTITIONER

## 2023-04-27 PROCEDURE — 97602 WOUND(S) CARE NON-SELECTIVE: CPT

## 2023-04-27 PROCEDURE — 63710000001 SODIUM HYPOCHLORITE 0.25 % SOLUTION 473 ML BOTTLE: Performed by: NURSE PRACTITIONER

## 2023-04-27 PROCEDURE — 63710000001 ALUMINUM-MAGNESIUM HYDROXIDE-SIMETHICONE 200-200-20 MG/5ML SUSPENSION

## 2023-04-27 PROCEDURE — 63710000001 CLOTRIMAZOLE 1 % CREAM

## 2023-04-27 PROCEDURE — 63710000001 ZINC OXIDE 20 % OINTMENT

## 2023-04-27 PROCEDURE — 63710000001 A&D OINTMENT 425 G JAR: Performed by: NURSE PRACTITIONER

## 2023-04-27 RX ORDER — LIDOCAINE HYDROCHLORIDE 20 MG/ML
JELLY TOPICAL AS NEEDED
Start: 2023-04-27

## 2023-04-27 RX ORDER — SODIUM HYPOCHLORITE 2.5 MG/ML
1 SOLUTION TOPICAL AS NEEDED
OUTPATIENT
Start: 2023-04-27

## 2023-04-27 RX ORDER — SODIUM HYPOCHLORITE 1.25 MG/ML
1 SOLUTION TOPICAL AS NEEDED
OUTPATIENT
Start: 2023-04-27

## 2023-04-27 RX ORDER — SODIUM HYPOCHLORITE 2.5 MG/ML
1 SOLUTION TOPICAL AS NEEDED
Status: DISCONTINUED | OUTPATIENT
Start: 2023-04-27 | End: 2023-04-28 | Stop reason: HOSPADM

## 2023-04-27 RX ORDER — CASTOR OIL AND BALSAM, PERU 788; 87 MG/G; MG/G
1 OINTMENT TOPICAL AS NEEDED
OUTPATIENT
Start: 2023-04-27

## 2023-04-27 RX ORDER — LIDOCAINE HYDROCHLORIDE 20 MG/ML
JELLY TOPICAL AS NEEDED
OUTPATIENT
Start: 2023-04-27

## 2023-04-27 RX ADMIN — VITAMINS A AND D OINTMENT 1 APPLICATION: 15.5; 53.4 OINTMENT TOPICAL at 14:48

## 2023-04-27 RX ADMIN — VITAMINS A AND D OINTMENT 1 APPLICATION: 15.5; 53.4 OINTMENT TOPICAL at 14:43

## 2023-04-27 RX ADMIN — HYOSCYAMINE SULFATE 473 ML: 16 SOLUTION at 14:43

## 2023-04-30 ENCOUNTER — HOSPITAL ENCOUNTER (EMERGENCY)
Facility: HOSPITAL | Age: 48
Discharge: HOME | End: 2023-05-01
Attending: STUDENT IN AN ORGANIZED HEALTH CARE EDUCATION/TRAINING PROGRAM
Payer: MEDICARE

## 2023-04-30 DIAGNOSIS — R21 RASH: Primary | ICD-10-CM

## 2023-04-30 PROCEDURE — 99283 EMERGENCY DEPT VISIT LOW MDM: CPT

## 2023-04-30 PROCEDURE — 36415 COLL VENOUS BLD VENIPUNCTURE: CPT

## 2023-04-30 NOTE — Clinical Note
[facility] EMERGENCY DEPARTMENT  [address] [place] [address]  Phone: [phone]    Pineda Krueger accompanied Ken Krueger to the emergency department on 4/30/2023. They may return to work on 05/02/2023.        Thank you for choosing [facility].    Jacqueline Wade MD

## 2023-05-01 VITALS
HEIGHT: 71 IN | OXYGEN SATURATION: 97 % | DIASTOLIC BLOOD PRESSURE: 70 MMHG | SYSTOLIC BLOOD PRESSURE: 132 MMHG | BODY MASS INDEX: 44.1 KG/M2 | HEART RATE: 86 BPM | WEIGHT: 315 LBS | RESPIRATION RATE: 16 BRPM | TEMPERATURE: 97.2 F

## 2023-05-01 LAB
ALBUMIN SERPL-MCNC: 3.1 G/DL (ref 3.5–5.2)
ALBUMIN/GLOB SERPL: 0.6 G/DL
ALP SERPL-CCNC: 127 U/L (ref 39–117)
ALT SERPL W P-5'-P-CCNC: 16 U/L (ref 1–41)
ANION GAP SERPL CALCULATED.3IONS-SCNC: 14.2 MMOL/L (ref 5–15)
AST SERPL-CCNC: 16 U/L (ref 1–40)
BASOPHILS # BLD AUTO: 0.03 10*3/MM3 (ref 0–0.2)
BASOPHILS NFR BLD AUTO: 0.2 % (ref 0–1.5)
BILIRUB SERPL-MCNC: 0.2 MG/DL (ref 0–1.2)
BUN SERPL-MCNC: 26 MG/DL (ref 6–20)
BUN/CREAT SERPL: 23.4 (ref 7–25)
CALCIUM SPEC-SCNC: 9.2 MG/DL (ref 8.6–10.5)
CHLORIDE SERPL-SCNC: 95 MMOL/L (ref 98–107)
CO2 SERPL-SCNC: 26.8 MMOL/L (ref 22–29)
CREAT SERPL-MCNC: 1.11 MG/DL (ref 0.76–1.27)
DEPRECATED RDW RBC AUTO: 45.9 FL (ref 37–54)
EGFRCR SERPLBLD CKD-EPI 2021: 83.5 ML/MIN/1.73
EOSINOPHIL # BLD AUTO: 0.11 10*3/MM3 (ref 0–0.4)
EOSINOPHIL NFR BLD AUTO: 0.8 % (ref 0.3–6.2)
ERYTHROCYTE [DISTWIDTH] IN BLOOD BY AUTOMATED COUNT: 15.2 % (ref 12.3–15.4)
GLOBULIN UR ELPH-MCNC: 5.2 GM/DL
GLUCOSE SERPL-MCNC: 329 MG/DL (ref 65–99)
HCT VFR BLD AUTO: 39.2 % (ref 37.5–51)
HGB BLD-MCNC: 11.4 G/DL (ref 13–17.7)
IMM GRANULOCYTES # BLD AUTO: 0.07 10*3/MM3 (ref 0–0.05)
IMM GRANULOCYTES NFR BLD AUTO: 0.5 % (ref 0–0.5)
LYMPHOCYTES # BLD AUTO: 1.66 10*3/MM3 (ref 0.7–3.1)
LYMPHOCYTES NFR BLD AUTO: 12.1 % (ref 19.6–45.3)
MCH RBC QN AUTO: 24.1 PG (ref 26.6–33)
MCHC RBC AUTO-ENTMCNC: 29.1 G/DL (ref 31.5–35.7)
MCV RBC AUTO: 82.7 FL (ref 79–97)
MONOCYTES # BLD AUTO: 0.43 10*3/MM3 (ref 0.1–0.9)
MONOCYTES NFR BLD AUTO: 3.1 % (ref 5–12)
NEUTROPHILS NFR BLD AUTO: 11.45 10*3/MM3 (ref 1.7–7)
NEUTROPHILS NFR BLD AUTO: 83.3 % (ref 42.7–76)
NRBC BLD AUTO-RTO: 0 /100 WBC (ref 0–0.2)
PLATELET # BLD AUTO: 444 10*3/MM3 (ref 140–450)
PMV BLD AUTO: 9 FL (ref 6–12)
POTASSIUM SERPL-SCNC: 2.9 MMOL/L (ref 3.5–5.2)
PROT SERPL-MCNC: 8.3 G/DL (ref 6–8.5)
RBC # BLD AUTO: 4.74 10*6/MM3 (ref 4.14–5.8)
SODIUM SERPL-SCNC: 136 MMOL/L (ref 136–145)
WBC NRBC COR # BLD: 13.75 10*3/MM3 (ref 3.4–10.8)

## 2023-05-01 PROCEDURE — 80053 COMPREHEN METABOLIC PANEL: CPT | Performed by: STUDENT IN AN ORGANIZED HEALTH CARE EDUCATION/TRAINING PROGRAM

## 2023-05-01 PROCEDURE — 85025 COMPLETE CBC W/AUTO DIFF WBC: CPT | Performed by: STUDENT IN AN ORGANIZED HEALTH CARE EDUCATION/TRAINING PROGRAM

## 2023-05-01 PROCEDURE — 36415 COLL VENOUS BLD VENIPUNCTURE: CPT

## 2023-05-01 RX ORDER — SULFAMETHOXAZOLE AND TRIMETHOPRIM 800; 160 MG/1; MG/1
1 TABLET ORAL 2 TIMES DAILY
Qty: 14 TABLET | Refills: 0 | Status: SHIPPED | OUTPATIENT
Start: 2023-05-01 | End: 2023-05-08

## 2023-05-01 RX ORDER — POTASSIUM CHLORIDE 20 MEQ/1
40 TABLET, EXTENDED RELEASE ORAL ONCE
Status: COMPLETED | OUTPATIENT
Start: 2023-05-01 | End: 2023-05-01

## 2023-05-01 RX ADMIN — POTASSIUM CHLORIDE 40 MEQ: 20 TABLET, EXTENDED RELEASE ORAL at 02:05

## 2023-05-04 ENCOUNTER — APPOINTMENT (OUTPATIENT)
Dept: CT IMAGING | Facility: HOSPITAL | Age: 48
End: 2023-05-04
Payer: MEDICARE

## 2023-05-04 ENCOUNTER — READMISSION MANAGEMENT (OUTPATIENT)
Dept: CALL CENTER | Facility: HOSPITAL | Age: 48
End: 2023-05-04
Payer: MEDICARE

## 2023-05-04 ENCOUNTER — HOSPITAL ENCOUNTER (EMERGENCY)
Facility: HOSPITAL | Age: 48
Discharge: HOME | End: 2023-05-04
Attending: EMERGENCY MEDICINE
Payer: MEDICARE

## 2023-05-04 ENCOUNTER — APPOINTMENT (OUTPATIENT)
Dept: GENERAL RADIOLOGY | Facility: HOSPITAL | Age: 48
End: 2023-05-04
Payer: MEDICARE

## 2023-05-04 VITALS
DIASTOLIC BLOOD PRESSURE: 56 MMHG | HEIGHT: 71 IN | WEIGHT: 315 LBS | BODY MASS INDEX: 44.1 KG/M2 | OXYGEN SATURATION: 100 % | SYSTOLIC BLOOD PRESSURE: 105 MMHG | TEMPERATURE: 97.1 F | HEART RATE: 80 BPM | RESPIRATION RATE: 18 BRPM

## 2023-05-04 DIAGNOSIS — N39.0 ACUTE LOWER UTI: ICD-10-CM

## 2023-05-04 DIAGNOSIS — L98.424 SKIN ULCER OF SACRUM WITH NECROSIS OF BONE: Primary | ICD-10-CM

## 2023-05-04 LAB
ALBUMIN SERPL-MCNC: 2.8 G/DL (ref 3.5–5.2)
ALBUMIN/GLOB SERPL: 0.5 G/DL
ALP SERPL-CCNC: 137 U/L (ref 39–117)
ALT SERPL W P-5'-P-CCNC: 30 U/L (ref 1–41)
ANION GAP SERPL CALCULATED.3IONS-SCNC: 13.4 MMOL/L (ref 5–15)
ANISOCYTOSIS BLD QL: NORMAL
AST SERPL-CCNC: 30 U/L (ref 1–40)
BACTERIA UR QL AUTO: ABNORMAL /HPF
BASOPHILS # BLD AUTO: 0.03 10*3/MM3 (ref 0–0.2)
BASOPHILS NFR BLD AUTO: 0.4 % (ref 0–1.5)
BILIRUB SERPL-MCNC: 0.3 MG/DL (ref 0–1.2)
BILIRUB UR QL STRIP: NEGATIVE
BUN SERPL-MCNC: 36 MG/DL (ref 6–20)
BUN/CREAT SERPL: 22.2 (ref 7–25)
CA-I SERPL ISE-MCNC: 1.22 MMOL/L (ref 1.12–1.32)
CALCIUM SPEC-SCNC: 9.1 MG/DL (ref 8.6–10.5)
CHLORIDE SERPL-SCNC: 97 MMOL/L (ref 98–107)
CLARITY UR: ABNORMAL
CO2 SERPL-SCNC: 22.6 MMOL/L (ref 22–29)
COLOR UR: YELLOW
CREAT SERPL-MCNC: 1.62 MG/DL (ref 0.76–1.27)
CRP SERPL-MCNC: 16.28 MG/DL (ref 0–0.5)
D-LACTATE SERPL-SCNC: 1.7 MMOL/L (ref 0.5–2)
DEPRECATED RDW RBC AUTO: 47.2 FL (ref 37–54)
EGFRCR SERPLBLD CKD-EPI 2021: 53 ML/MIN/1.73
EOSINOPHIL # BLD AUTO: 0.03 10*3/MM3 (ref 0–0.4)
EOSINOPHIL NFR BLD AUTO: 0.4 % (ref 0.3–6.2)
ERYTHROCYTE [DISTWIDTH] IN BLOOD BY AUTOMATED COUNT: 15.9 % (ref 12.3–15.4)
FLUAV RNA RESP QL NAA+PROBE: NOT DETECTED
FLUBV RNA RESP QL NAA+PROBE: NOT DETECTED
GEN 5 2HR TROPONIN T REFLEX: 82 NG/L
GLOBULIN UR ELPH-MCNC: 5.4 GM/DL
GLUCOSE SERPL-MCNC: 156 MG/DL (ref 65–99)
GLUCOSE UR STRIP-MCNC: NEGATIVE MG/DL
HCT VFR BLD AUTO: 36.6 % (ref 37.5–51)
HGB BLD-MCNC: 10.4 G/DL (ref 13–17.7)
HGB UR QL STRIP.AUTO: ABNORMAL
HOLD SPECIMEN: NORMAL
HOLD SPECIMEN: NORMAL
HYALINE CASTS UR QL AUTO: ABNORMAL /LPF
HYPOCHROMIA BLD QL: NORMAL
IMM GRANULOCYTES # BLD AUTO: 0.04 10*3/MM3 (ref 0–0.05)
IMM GRANULOCYTES NFR BLD AUTO: 0.5 % (ref 0–0.5)
KETONES UR QL STRIP: NEGATIVE
LEUKOCYTE ESTERASE UR QL STRIP.AUTO: ABNORMAL
LYMPHOCYTES # BLD AUTO: 1.22 10*3/MM3 (ref 0.7–3.1)
LYMPHOCYTES NFR BLD AUTO: 14.4 % (ref 19.6–45.3)
MAGNESIUM SERPL-MCNC: 1.9 MG/DL (ref 1.6–2.6)
MCH RBC QN AUTO: 23.3 PG (ref 26.6–33)
MCHC RBC AUTO-ENTMCNC: 28.4 G/DL (ref 31.5–35.7)
MCV RBC AUTO: 82.1 FL (ref 79–97)
MONOCYTES # BLD AUTO: 0.4 10*3/MM3 (ref 0.1–0.9)
MONOCYTES NFR BLD AUTO: 4.7 % (ref 5–12)
NEUTROPHILS NFR BLD AUTO: 6.77 10*3/MM3 (ref 1.7–7)
NEUTROPHILS NFR BLD AUTO: 79.6 % (ref 42.7–76)
NITRITE UR QL STRIP: POSITIVE
NRBC BLD AUTO-RTO: 0 /100 WBC (ref 0–0.2)
NT-PROBNP SERPL-MCNC: 2158 PG/ML (ref 0–450)
PH UR STRIP.AUTO: 6 [PH] (ref 5–8)
PHOSPHATE SERPL-MCNC: 4.8 MG/DL (ref 2.5–4.5)
PLAT MORPH BLD: NORMAL
PLATELET # BLD AUTO: 402 10*3/MM3 (ref 140–450)
PMV BLD AUTO: 9.2 FL (ref 6–12)
POTASSIUM SERPL-SCNC: 3.7 MMOL/L (ref 3.5–5.2)
PROT SERPL-MCNC: 8.2 G/DL (ref 6–8.5)
PROT UR QL STRIP: ABNORMAL
QT INTERVAL: 436 MS
QTC INTERVAL: 490 MS
RBC # BLD AUTO: 4.46 10*6/MM3 (ref 4.14–5.8)
RBC # UR STRIP: ABNORMAL /HPF
REF LAB TEST METHOD: ABNORMAL
SARS-COV-2 RNA RESP QL NAA+PROBE: NOT DETECTED
SODIUM SERPL-SCNC: 133 MMOL/L (ref 136–145)
SP GR UR STRIP: 1.02 (ref 1–1.03)
SQUAMOUS #/AREA URNS HPF: ABNORMAL /HPF
TROPONIN T DELTA: -5 NG/L
TROPONIN T SERPL HS-MCNC: 87 NG/L
UROBILINOGEN UR QL STRIP: ABNORMAL
WBC # UR STRIP: ABNORMAL /HPF
WBC NRBC COR # BLD: 8.49 10*3/MM3 (ref 3.4–10.8)
WHOLE BLOOD HOLD COAG: NORMAL
WHOLE BLOOD HOLD SPECIMEN: NORMAL
YEAST URNS QL MICRO: ABNORMAL /HPF

## 2023-05-04 PROCEDURE — 82330 ASSAY OF CALCIUM: CPT | Performed by: PHYSICIAN ASSISTANT

## 2023-05-04 PROCEDURE — 83605 ASSAY OF LACTIC ACID: CPT | Performed by: PHYSICIAN ASSISTANT

## 2023-05-04 PROCEDURE — 93005 ELECTROCARDIOGRAM TRACING: CPT | Performed by: PHYSICIAN ASSISTANT

## 2023-05-04 PROCEDURE — 83735 ASSAY OF MAGNESIUM: CPT | Performed by: PHYSICIAN ASSISTANT

## 2023-05-04 PROCEDURE — 87040 BLOOD CULTURE FOR BACTERIA: CPT | Performed by: PHYSICIAN ASSISTANT

## 2023-05-04 PROCEDURE — 99284 EMERGENCY DEPT VISIT MOD MDM: CPT

## 2023-05-04 PROCEDURE — 85025 COMPLETE CBC W/AUTO DIFF WBC: CPT | Performed by: PHYSICIAN ASSISTANT

## 2023-05-04 PROCEDURE — 80053 COMPREHEN METABOLIC PANEL: CPT | Performed by: PHYSICIAN ASSISTANT

## 2023-05-04 PROCEDURE — 71250 CT THORAX DX C-: CPT

## 2023-05-04 PROCEDURE — 85007 BL SMEAR W/DIFF WBC COUNT: CPT | Performed by: PHYSICIAN ASSISTANT

## 2023-05-04 PROCEDURE — 81001 URINALYSIS AUTO W/SCOPE: CPT | Performed by: PHYSICIAN ASSISTANT

## 2023-05-04 PROCEDURE — 74176 CT ABD & PELVIS W/O CONTRAST: CPT

## 2023-05-04 PROCEDURE — 84484 ASSAY OF TROPONIN QUANT: CPT | Performed by: PHYSICIAN ASSISTANT

## 2023-05-04 PROCEDURE — 87636 SARSCOV2 & INF A&B AMP PRB: CPT | Performed by: PHYSICIAN ASSISTANT

## 2023-05-04 PROCEDURE — 84100 ASSAY OF PHOSPHORUS: CPT | Performed by: PHYSICIAN ASSISTANT

## 2023-05-04 PROCEDURE — 71045 X-RAY EXAM CHEST 1 VIEW: CPT

## 2023-05-04 PROCEDURE — 83880 ASSAY OF NATRIURETIC PEPTIDE: CPT | Performed by: PHYSICIAN ASSISTANT

## 2023-05-04 PROCEDURE — 86140 C-REACTIVE PROTEIN: CPT | Performed by: PHYSICIAN ASSISTANT

## 2023-05-04 PROCEDURE — 36415 COLL VENOUS BLD VENIPUNCTURE: CPT

## 2023-05-04 RX ORDER — SODIUM CHLORIDE 0.9 % (FLUSH) 0.9 %
10 SYRINGE (ML) INJECTION AS NEEDED
Status: DISCONTINUED | OUTPATIENT
Start: 2023-05-04 | End: 2023-05-04 | Stop reason: HOSPADM

## 2023-05-04 RX ORDER — AMOXICILLIN AND CLAVULANATE POTASSIUM 875; 125 MG/1; MG/1
1 TABLET, FILM COATED ORAL ONCE
Status: COMPLETED | OUTPATIENT
Start: 2023-05-04 | End: 2023-05-04

## 2023-05-04 RX ORDER — AMOXICILLIN AND CLAVULANATE POTASSIUM 875; 125 MG/1; MG/1
1 TABLET, FILM COATED ORAL 2 TIMES DAILY
Qty: 20 TABLET | Refills: 0 | Status: SHIPPED | OUTPATIENT
Start: 2023-05-04 | End: 2023-05-14

## 2023-05-04 RX ADMIN — SODIUM CHLORIDE 2000 ML: 9 INJECTION, SOLUTION INTRAVENOUS at 12:34

## 2023-05-04 RX ADMIN — AMOXICILLIN AND CLAVULANATE POTASSIUM 1 TABLET: 875; 125 TABLET, FILM COATED ORAL at 15:59

## 2023-05-04 NOTE — OUTREACH NOTE
Sepsis Week 3 Survey    Flowsheet Row Responses   [facility] patient discharged from? [place]   Does the patient have one of the following disease processes/diagnoses(primary or secondary)? Sepsis   Week 3 attempt successful? No   Unsuccessful attempts Attempt 1  [ED]          HILDA SALAZAR - Registered Nurse
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
## 2023-05-09 LAB
BACTERIA SPEC AEROBE CULT: NORMAL
BACTERIA SPEC AEROBE CULT: NORMAL

## 2023-05-25 ENCOUNTER — HOSPITAL ENCOUNTER (OUTPATIENT)
Dept: WOUND CARE | Facility: HOSPITAL | Age: 48
Discharge: HOME | End: 2023-05-25
Payer: MEDICARE

## 2023-05-25 VITALS
DIASTOLIC BLOOD PRESSURE: 45 MMHG | SYSTOLIC BLOOD PRESSURE: 90 MMHG | HEART RATE: 80 BPM | TEMPERATURE: 98.7 F | RESPIRATION RATE: 18 BRPM

## 2023-05-25 DIAGNOSIS — L89.002 PRESSURE INJURY OF ELBOW, STAGE 2, UNSPECIFIED LATERALITY: Primary | ICD-10-CM

## 2023-05-25 DIAGNOSIS — L89.314 PRESSURE INJURY OF RIGHT BUTTOCK, STAGE 4: ICD-10-CM

## 2023-05-25 DIAGNOSIS — L89.154 DECUBITUS ULCER OF SACRAL REGION, STAGE 4: Primary | ICD-10-CM

## 2023-05-25 DIAGNOSIS — L89.310 PRESSURE INJURY OF RIGHT BUTTOCK, UNSTAGEABLE: ICD-10-CM

## 2023-05-25 DIAGNOSIS — M86.60 OTHER CHRONIC OSTEOMYELITIS, UNSPECIFIED SITE: ICD-10-CM

## 2023-05-25 LAB
ALBUMIN SERPL-MCNC: 2.9 G/DL (ref 3.5–5.2)
ALBUMIN/GLOB SERPL: 0.5 G/DL
ALP SERPL-CCNC: 132 U/L (ref 39–117)
ALT SERPL W P-5'-P-CCNC: 22 U/L (ref 1–41)
ANION GAP SERPL CALCULATED.3IONS-SCNC: 15.1 MMOL/L (ref 5–15)
AST SERPL-CCNC: 15 U/L (ref 1–40)
BASOPHILS # BLD AUTO: 0.03 10*3/MM3 (ref 0–0.2)
BASOPHILS NFR BLD AUTO: 0.3 % (ref 0–1.5)
BILIRUB SERPL-MCNC: 0.2 MG/DL (ref 0–1.2)
BUN SERPL-MCNC: 23 MG/DL (ref 6–20)
BUN/CREAT SERPL: 20.9 (ref 7–25)
CALCIUM SPEC-SCNC: 8.8 MG/DL (ref 8.6–10.5)
CHLORIDE SERPL-SCNC: 97 MMOL/L (ref 98–107)
CO2 SERPL-SCNC: 21.9 MMOL/L (ref 22–29)
CREAT SERPL-MCNC: 1.1 MG/DL (ref 0.76–1.27)
CRP SERPL-MCNC: 12.07 MG/DL (ref 0–0.5)
DEPRECATED RDW RBC AUTO: 50.6 FL (ref 37–54)
EGFRCR SERPLBLD CKD-EPI 2021: 84.4 ML/MIN/1.73
EOSINOPHIL # BLD AUTO: 0.16 10*3/MM3 (ref 0–0.4)
EOSINOPHIL NFR BLD AUTO: 1.5 % (ref 0.3–6.2)
ERYTHROCYTE [DISTWIDTH] IN BLOOD BY AUTOMATED COUNT: 17.3 % (ref 12.3–15.4)
ERYTHROCYTE [SEDIMENTATION RATE] IN BLOOD: >130 MM/HR (ref 0–15)
GLOBULIN UR ELPH-MCNC: 5.5 GM/DL
GLUCOSE SERPL-MCNC: 209 MG/DL (ref 65–99)
HCT VFR BLD AUTO: 35.7 % (ref 37.5–51)
HGB BLD-MCNC: 10.1 G/DL (ref 13–17.7)
HYPOCHROMIA BLD QL: NORMAL
IMM GRANULOCYTES # BLD AUTO: 0.05 10*3/MM3 (ref 0–0.05)
IMM GRANULOCYTES NFR BLD AUTO: 0.5 % (ref 0–0.5)
LYMPHOCYTES # BLD AUTO: 1.72 10*3/MM3 (ref 0.7–3.1)
LYMPHOCYTES NFR BLD AUTO: 16.4 % (ref 19.6–45.3)
MCH RBC QN AUTO: 23.1 PG (ref 26.6–33)
MCHC RBC AUTO-ENTMCNC: 28.3 G/DL (ref 31.5–35.7)
MCV RBC AUTO: 81.7 FL (ref 79–97)
MONOCYTES # BLD AUTO: 0.37 10*3/MM3 (ref 0.1–0.9)
MONOCYTES NFR BLD AUTO: 3.5 % (ref 5–12)
NEUTROPHILS NFR BLD AUTO: 77.8 % (ref 42.7–76)
NEUTROPHILS NFR BLD AUTO: 8.15 10*3/MM3 (ref 1.7–7)
NRBC BLD AUTO-RTO: 0 /100 WBC (ref 0–0.2)
PLAT MORPH BLD: NORMAL
PLATELET # BLD AUTO: 485 10*3/MM3 (ref 140–450)
PMV BLD AUTO: 9 FL (ref 6–12)
POTASSIUM SERPL-SCNC: 3.4 MMOL/L (ref 3.5–5.2)
PROT SERPL-MCNC: 8.4 G/DL (ref 6–8.5)
RBC # BLD AUTO: 4.37 10*6/MM3 (ref 4.14–5.8)
SODIUM SERPL-SCNC: 134 MMOL/L (ref 136–145)
WBC NRBC COR # BLD: 10.48 10*3/MM3 (ref 3.4–10.8)

## 2023-05-25 PROCEDURE — A9270 NON-COVERED ITEM OR SERVICE: HCPCS | Performed by: NURSE PRACTITIONER

## 2023-05-25 PROCEDURE — 87205 SMEAR GRAM STAIN: CPT | Performed by: NURSE PRACTITIONER

## 2023-05-25 PROCEDURE — 63710000001 ALUMINUM-MAGNESIUM HYDROXIDE-SIMETHICONE 200-200-20 MG/5ML SUSPENSION: Performed by: NURSE PRACTITIONER

## 2023-05-25 PROCEDURE — 86140 C-REACTIVE PROTEIN: CPT | Performed by: NURSE PRACTITIONER

## 2023-05-25 PROCEDURE — 85652 RBC SED RATE AUTOMATED: CPT | Performed by: NURSE PRACTITIONER

## 2023-05-25 PROCEDURE — 63710000001 ZINC OXIDE 20 % OINTMENT: Performed by: NURSE PRACTITIONER

## 2023-05-25 PROCEDURE — 80053 COMPREHEN METABOLIC PANEL: CPT | Performed by: NURSE PRACTITIONER

## 2023-05-25 PROCEDURE — 63710000001 SODIUM HYPOCHLORITE 0.25 % SOLUTION 473 ML BOTTLE: Performed by: NURSE PRACTITIONER

## 2023-05-25 PROCEDURE — 84134 ASSAY OF PREALBUMIN: CPT | Performed by: NURSE PRACTITIONER

## 2023-05-25 PROCEDURE — 36415 COLL VENOUS BLD VENIPUNCTURE: CPT

## 2023-05-25 PROCEDURE — 87077 CULTURE AEROBIC IDENTIFY: CPT | Performed by: NURSE PRACTITIONER

## 2023-05-25 PROCEDURE — 85025 COMPLETE CBC W/AUTO DIFF WBC: CPT | Performed by: NURSE PRACTITIONER

## 2023-05-25 PROCEDURE — 63710000001 COLLAGENASE 250 UNIT/GM OINTMENT 30 G TUBE: Performed by: NURSE PRACTITIONER

## 2023-05-25 PROCEDURE — 63710000001 CLOTRIMAZOLE 1 % CREAM: Performed by: NURSE PRACTITIONER

## 2023-05-25 PROCEDURE — 87070 CULTURE OTHR SPECIMN AEROBIC: CPT | Performed by: NURSE PRACTITIONER

## 2023-05-25 PROCEDURE — 63710000001 A+D PREVENT OINTMENT: Performed by: NURSE PRACTITIONER

## 2023-05-25 PROCEDURE — 87186 SC STD MICRODIL/AGAR DIL: CPT | Performed by: NURSE PRACTITIONER

## 2023-05-25 PROCEDURE — 85007 BL SMEAR W/DIFF WBC COUNT: CPT | Performed by: NURSE PRACTITIONER

## 2023-05-25 RX ORDER — CASTOR OIL AND BALSAM, PERU 788; 87 MG/G; MG/G
1 OINTMENT TOPICAL AS NEEDED
OUTPATIENT
Start: 2023-05-25

## 2023-05-25 RX ORDER — LIDOCAINE HYDROCHLORIDE 20 MG/ML
JELLY TOPICAL AS NEEDED
OUTPATIENT
Start: 2023-05-25

## 2023-05-25 RX ORDER — SODIUM CHLORIDE 0.9 % (FLUSH) 0.9 %
10 SYRINGE (ML) INJECTION AS NEEDED
OUTPATIENT
Start: 2023-05-25

## 2023-05-25 RX ORDER — SODIUM CHLORIDE 9 MG/ML
40 INJECTION, SOLUTION INTRAVENOUS AS NEEDED
OUTPATIENT
Start: 2023-05-25

## 2023-05-25 RX ORDER — SODIUM CHLORIDE 0.9 % (FLUSH) 0.9 %
3 SYRINGE (ML) INJECTION EVERY 12 HOURS SCHEDULED
OUTPATIENT
Start: 2023-05-25

## 2023-05-25 RX ORDER — SODIUM HYPOCHLORITE 2.5 MG/ML
1 SOLUTION TOPICAL AS NEEDED
OUTPATIENT
Start: 2023-05-25

## 2023-05-25 RX ORDER — SODIUM HYPOCHLORITE 1.25 MG/ML
1 SOLUTION TOPICAL AS NEEDED
OUTPATIENT
Start: 2023-05-25

## 2023-05-25 RX ORDER — LIDOCAINE HYDROCHLORIDE 20 MG/ML
JELLY TOPICAL AS NEEDED
Start: 2023-05-25

## 2023-05-25 RX ORDER — SODIUM HYPOCHLORITE 2.5 MG/ML
1 SOLUTION TOPICAL AS NEEDED
Status: DISCONTINUED | OUTPATIENT
Start: 2023-05-25 | End: 2023-05-26 | Stop reason: HOSPADM

## 2023-05-25 RX ADMIN — COLLAGENASE SANTYL 1 APPLICATION: 250 OINTMENT TOPICAL at 14:40

## 2023-05-25 RX ADMIN — SODIUM HYPOCHLORITE 473 ML: 2.5 SOLUTION TOPICAL at 16:06

## 2023-05-25 NOTE — LETTER
Ken Krueger 1975           Wound Care orders 5/25/2023      Right upper Gluteal: Cleanse with Dakins. Pat dry. Apply santyl to the dark area of wound deep into wound bed, Pack rest of wound  with Hydrogel moistened gauze and cover with Super absorbant dressing.  Change daily.    Right Lower Gluteal:Cleanse with Dakins, Pack with hydrogel, cover with super absorbant dressing. Change daily.    Left upper Gluteal: Cleanse with Dakins, Pack with hydrogel, cover with super absorbant dressing. Change daily.    Left lower Gluteal: Cleanse with Dakins, Pat dry, Pack with hydrogel moistened guaze and cover with Super absorbant dressing. Change daily.    Lower back wound: Cleanse with normal saline, Apply honey gel, cover with a telfa dressing.    Left trochanter: Cleanse with normal saline, Apply honey gel, cover with a telfa dressing.    Right shin: Cleanse with normal saline, Apply honey gel, cover with a telfa dressing.    Right ankle AND scattered scab areas: Cleanse with wound cleanser, Pat dry, Apply Betadine, cover with Meplix. Change 3 times a week.          Cathie BLANC
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
## 2023-05-26 DIAGNOSIS — L89.154 DECUBITUS ULCER OF SACRAL REGION, STAGE 4: Primary | ICD-10-CM

## 2023-05-26 LAB — PREALB SERPL-MCNC: 14.3 MG/DL (ref 20–40)

## 2023-05-26 RX ORDER — ENOXAPARIN SODIUM 100 MG/ML
1 INJECTION SUBCUTANEOUS EVERY 12 HOURS SCHEDULED
Qty: 2.9 ML | Refills: 0 | Status: SHIPPED | OUTPATIENT
Start: 2023-05-26

## 2023-05-29 LAB
BACTERIA SPEC AEROBE CULT: ABNORMAL
GRAM STN SPEC: ABNORMAL

## 2023-05-30 ENCOUNTER — ANESTHESIA (OUTPATIENT)
Dept: PERIOP | Facility: HOSPITAL | Age: 48
End: 2023-05-30

## 2023-05-30 ENCOUNTER — ANESTHESIA EVENT (OUTPATIENT)
Dept: PERIOP | Facility: HOSPITAL | Age: 48
End: 2023-05-30

## 2023-05-30 ENCOUNTER — HOSPITAL ENCOUNTER (OUTPATIENT)
Facility: HOSPITAL | Age: 48
Setting detail: HOSPITAL OUTPATIENT SURGERY
Discharge: HOME | End: 2023-05-30
Attending: SURGERY | Admitting: SURGERY

## 2023-05-30 VITALS
OXYGEN SATURATION: 94 % | SYSTOLIC BLOOD PRESSURE: 124 MMHG | TEMPERATURE: 97.9 F | BODY MASS INDEX: 44.1 KG/M2 | WEIGHT: 315 LBS | RESPIRATION RATE: 18 BRPM | HEIGHT: 71 IN | DIASTOLIC BLOOD PRESSURE: 72 MMHG | HEART RATE: 78 BPM

## 2023-05-30 DIAGNOSIS — L89.154 DECUBITUS ULCER OF SACRAL REGION, STAGE 4: ICD-10-CM

## 2023-05-30 LAB
GLUCOSE BLDC GLUCOMTR-MCNC: 182 MG/DL (ref 70–130)
GLUCOSE BLDC GLUCOMTR-MCNC: 225 MG/DL (ref 70–130)

## 2023-05-30 PROCEDURE — 25010000002 AMPICILLIN-SULBACTAM PER 1.5 G: Performed by: SURGERY

## 2023-05-30 PROCEDURE — 25010000002 PROPOFOL 200 MG/20ML EMULSION: Performed by: NURSE ANESTHETIST, CERTIFIED REGISTERED

## 2023-05-30 PROCEDURE — 82948 REAGENT STRIP/BLOOD GLUCOSE: CPT

## 2023-05-30 PROCEDURE — 25010000002 MIDAZOLAM PER 1 MG: Performed by: NURSE ANESTHETIST, CERTIFIED REGISTERED

## 2023-05-30 PROCEDURE — 25010000002 FENTANYL CITRATE (PF) 50 MCG/ML SOLUTION: Performed by: NURSE ANESTHETIST, CERTIFIED REGISTERED

## 2023-05-30 PROCEDURE — 25010000002 ONDANSETRON PER 1 MG: Performed by: NURSE ANESTHETIST, CERTIFIED REGISTERED

## 2023-05-30 RX ORDER — SODIUM CHLORIDE, SODIUM LACTATE, POTASSIUM CHLORIDE, CALCIUM CHLORIDE 600; 310; 30; 20 MG/100ML; MG/100ML; MG/100ML; MG/100ML
125 INJECTION, SOLUTION INTRAVENOUS ONCE
Status: COMPLETED | OUTPATIENT
Start: 2023-05-30 | End: 2023-05-30

## 2023-05-30 RX ORDER — KETOROLAC TROMETHAMINE 30 MG/ML
30 INJECTION, SOLUTION INTRAMUSCULAR; INTRAVENOUS EVERY 6 HOURS PRN
Status: DISCONTINUED | OUTPATIENT
Start: 2023-05-30 | End: 2023-05-30 | Stop reason: HOSPADM

## 2023-05-30 RX ORDER — OXYCODONE HYDROCHLORIDE AND ACETAMINOPHEN 5; 325 MG/1; MG/1
1 TABLET ORAL ONCE AS NEEDED
Status: DISCONTINUED | OUTPATIENT
Start: 2023-05-30 | End: 2023-05-30 | Stop reason: HOSPADM

## 2023-05-30 RX ORDER — MAGNESIUM HYDROXIDE 1200 MG/15ML
LIQUID ORAL AS NEEDED
Status: DISCONTINUED | OUTPATIENT
Start: 2023-05-30 | End: 2023-05-30 | Stop reason: HOSPADM

## 2023-05-30 RX ORDER — ONDANSETRON 2 MG/ML
4 INJECTION INTRAMUSCULAR; INTRAVENOUS AS NEEDED
Status: DISCONTINUED | OUTPATIENT
Start: 2023-05-30 | End: 2023-05-30 | Stop reason: HOSPADM

## 2023-05-30 RX ORDER — MEPERIDINE HYDROCHLORIDE 25 MG/ML
12.5 INJECTION INTRAMUSCULAR; INTRAVENOUS; SUBCUTANEOUS
Status: DISCONTINUED | OUTPATIENT
Start: 2023-05-30 | End: 2023-05-30 | Stop reason: HOSPADM

## 2023-05-30 RX ORDER — SODIUM CHLORIDE 0.9 % (FLUSH) 0.9 %
10 SYRINGE (ML) INJECTION EVERY 12 HOURS SCHEDULED
Status: DISCONTINUED | OUTPATIENT
Start: 2023-05-30 | End: 2023-05-30 | Stop reason: HOSPADM

## 2023-05-30 RX ORDER — SODIUM CHLORIDE 0.9 % (FLUSH) 0.9 %
10 SYRINGE (ML) INJECTION AS NEEDED
Status: DISCONTINUED | OUTPATIENT
Start: 2023-05-30 | End: 2023-05-30 | Stop reason: HOSPADM

## 2023-05-30 RX ORDER — MIDAZOLAM HYDROCHLORIDE 1 MG/ML
1 INJECTION INTRAMUSCULAR; INTRAVENOUS
Status: DISCONTINUED | OUTPATIENT
Start: 2023-05-30 | End: 2023-05-30 | Stop reason: HOSPADM

## 2023-05-30 RX ORDER — MIDAZOLAM HYDROCHLORIDE 1 MG/ML
INJECTION INTRAMUSCULAR; INTRAVENOUS AS NEEDED
Status: DISCONTINUED | OUTPATIENT
Start: 2023-05-30 | End: 2023-05-30 | Stop reason: SURG

## 2023-05-30 RX ORDER — LIDOCAINE HYDROCHLORIDE 20 MG/ML
INJECTION, SOLUTION EPIDURAL; INFILTRATION; INTRACAUDAL; PERINEURAL AS NEEDED
Status: DISCONTINUED | OUTPATIENT
Start: 2023-05-30 | End: 2023-05-30 | Stop reason: SURG

## 2023-05-30 RX ORDER — ONDANSETRON 2 MG/ML
INJECTION INTRAMUSCULAR; INTRAVENOUS AS NEEDED
Status: DISCONTINUED | OUTPATIENT
Start: 2023-05-30 | End: 2023-05-30 | Stop reason: SURG

## 2023-05-30 RX ORDER — FENTANYL CITRATE 50 UG/ML
50 INJECTION, SOLUTION INTRAMUSCULAR; INTRAVENOUS
Status: DISCONTINUED | OUTPATIENT
Start: 2023-05-30 | End: 2023-05-30 | Stop reason: HOSPADM

## 2023-05-30 RX ORDER — SODIUM CHLORIDE 0.9 % (FLUSH) 0.9 %
3 SYRINGE (ML) INJECTION EVERY 12 HOURS SCHEDULED
Status: DISCONTINUED | OUTPATIENT
Start: 2023-05-30 | End: 2023-05-30 | Stop reason: HOSPADM

## 2023-05-30 RX ORDER — SODIUM CHLORIDE, SODIUM LACTATE, POTASSIUM CHLORIDE, CALCIUM CHLORIDE 600; 310; 30; 20 MG/100ML; MG/100ML; MG/100ML; MG/100ML
INJECTION, SOLUTION INTRAVENOUS CONTINUOUS PRN
Status: DISCONTINUED | OUTPATIENT
Start: 2023-05-30 | End: 2023-05-30 | Stop reason: SURG

## 2023-05-30 RX ORDER — FENTANYL CITRATE 50 UG/ML
INJECTION, SOLUTION INTRAMUSCULAR; INTRAVENOUS AS NEEDED
Status: DISCONTINUED | OUTPATIENT
Start: 2023-05-30 | End: 2023-05-30 | Stop reason: SURG

## 2023-05-30 RX ORDER — FAMOTIDINE 10 MG/ML
INJECTION, SOLUTION INTRAVENOUS AS NEEDED
Status: DISCONTINUED | OUTPATIENT
Start: 2023-05-30 | End: 2023-05-30 | Stop reason: SURG

## 2023-05-30 RX ORDER — IPRATROPIUM BROMIDE AND ALBUTEROL SULFATE 2.5; .5 MG/3ML; MG/3ML
3 SOLUTION RESPIRATORY (INHALATION) ONCE AS NEEDED
Status: DISCONTINUED | OUTPATIENT
Start: 2023-05-30 | End: 2023-05-30 | Stop reason: HOSPADM

## 2023-05-30 RX ORDER — SODIUM CHLORIDE 9 MG/ML
40 INJECTION, SOLUTION INTRAVENOUS AS NEEDED
Status: DISCONTINUED | OUTPATIENT
Start: 2023-05-30 | End: 2023-05-30 | Stop reason: HOSPADM

## 2023-05-30 RX ORDER — PROPOFOL 10 MG/ML
INJECTION, EMULSION INTRAVENOUS AS NEEDED
Status: DISCONTINUED | OUTPATIENT
Start: 2023-05-30 | End: 2023-05-30 | Stop reason: SURG

## 2023-05-30 RX ORDER — SODIUM CHLORIDE, SODIUM LACTATE, POTASSIUM CHLORIDE, CALCIUM CHLORIDE 600; 310; 30; 20 MG/100ML; MG/100ML; MG/100ML; MG/100ML
100 INJECTION, SOLUTION INTRAVENOUS ONCE AS NEEDED
Status: DISCONTINUED | OUTPATIENT
Start: 2023-05-30 | End: 2023-05-30 | Stop reason: HOSPADM

## 2023-05-30 RX ADMIN — FENTANYL CITRATE 100 MCG: 50 INJECTION INTRAMUSCULAR; INTRAVENOUS at 11:57

## 2023-05-30 RX ADMIN — SODIUM CHLORIDE, POTASSIUM CHLORIDE, SODIUM LACTATE AND CALCIUM CHLORIDE 125 ML/HR: 600; 310; 30; 20 INJECTION, SOLUTION INTRAVENOUS at 09:56

## 2023-05-30 RX ADMIN — ONDANSETRON 4 MG: 2 INJECTION INTRAMUSCULAR; INTRAVENOUS at 12:01

## 2023-05-30 RX ADMIN — AMPICILLIN SODIUM AND SULBACTAM SODIUM 3 G: 2; 1 INJECTION, POWDER, FOR SOLUTION INTRAMUSCULAR; INTRAVENOUS at 11:57

## 2023-05-30 RX ADMIN — PROPOFOL 200 MG: 10 INJECTION, EMULSION INTRAVENOUS at 12:01

## 2023-05-30 RX ADMIN — LIDOCAINE HYDROCHLORIDE 60 MG: 20 INJECTION, SOLUTION EPIDURAL; INFILTRATION; INTRACAUDAL; PERINEURAL at 12:01

## 2023-05-30 RX ADMIN — FAMOTIDINE 20 MG: 10 INJECTION, SOLUTION INTRAVENOUS at 12:01

## 2023-05-30 RX ADMIN — SODIUM CHLORIDE, POTASSIUM CHLORIDE, SODIUM LACTATE AND CALCIUM CHLORIDE: 600; 310; 30; 20 INJECTION, SOLUTION INTRAVENOUS at 11:57

## 2023-05-30 RX ADMIN — MIDAZOLAM HYDROCHLORIDE 2 MG: 1 INJECTION, SOLUTION INTRAMUSCULAR; INTRAVENOUS at 11:57

## 2023-05-30 NOTE — ANESTHESIA PROCEDURE NOTES
Airway  Urgency: elective    Date/Time: 5/30/2023 12:02 PM  Airway not difficult    General Information and Staff    Patient location during procedure: OR  Anesthesiologist: Vik Edouard MD  CRNA/CAA: Arjun Rousseau CRNA    Indications and Patient Condition  Indications for airway management: airway protection    Preoxygenated: yes  MILS maintained throughout  Mask difficulty assessment: 0 - not attempted    Final Airway Details  Final airway type: supraglottic airway      Successful airway: unique  Size 4     Number of attempts at approach: 1  Assessment: lips, teeth, and gum same as pre-op and atraumatic intubation    Additional Comments  Dentition & lips unchanged from preop
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
## 2023-05-30 NOTE — OP NOTE
OPERATIVE NOTE    Patient Name:  Ken Krueger  YOB: 1975  [id number]    5/30/2023        PREOPERATIVE DIAGNOSIS: Stage IV sacral decubitus wound, paraplegia      POSTOPERATIVE DIAGNOSIS: Same       PROCEDURE PERFORMED: Excisional debridement including skin, soft tissue and muscle       SURGEON: Ricardo Taylor MD       SPECIMENS: None sent       ANESTHESIA: General       GRAFTS/IMPLANTS: Packing       FINDINGS:   1.  2 separate wounds  by skin bridge and necrotic fat.  Skin bridge and necrotic fat with underlying nonviable muscle excised to create 1 large wound.  This measures approximately 13 x 12 x 8 cm.       INDICATIONS:    The patient is a 47 y.o. male paraplegic with a known sacral decubitus wound.  I been asked by wound care APRN to perform an excisional debridement due to nonviable tissue within the wound.  Risks and benefits discussed with the patient     DESCRIPTION OF PROCEDURE:      After obtaining informed consent, the patient was taken to the operating room and placed in the left lateral decubitus position.  General anesthesia was initiated.  SCDs were placed on the patient and turned on.  Preop antibiotics were administered.  Patient was prepped and draped in a sterile manner and a proper timeout was performed    Upon examination of the 2 primary wounds.  They were  by central skin bridge with underlying necrotic fat.  The skin bridge and necrotic fat was excised with cautery.  There was underlying nonviable muscle that was also excised.  Hemostasis was achieved with electrocautery.  Wound measurements 13 x 12 x 8 cm at the completion of debridement.  This was packed with saline moistened Kerlix    At the completion of the case, all needle, instrument and lap counts were correct.  Patient was awakened, extubated and taken to the postop anesthesia care unit for recovery.    EBL: 45 cc       Ricardo Taylor MD  5/30/2023  13:32 EDT

## 2023-05-30 NOTE — ANESTHESIA POSTPROCEDURE EVALUATION
Patient: Ken Krueger    Procedure Summary     Date: 05/30/23 Room / Location:  [facility] OR 04 /  [facility]    Anesthesia Start: 1157 Anesthesia Stop: 1244    Procedure: DEBRIDEMENT SACRAL ULCER/WOUND (Abdomen) Diagnosis:       Decubitus ulcer of sacral region, stage 4      (Decubitus ulcer of sacral region, stage 4 [L89.154])    Surgeons: Ricardo Taylor MD Provider: Vik Edouard MD    Anesthesia Type: general ASA Status: 3          Anesthesia Type: general    Vitals  Vitals Value Taken Time   /67 05/30/23 1336   Temp 99.6 °F (37.6 °C) 05/30/23 1246   Pulse 79 05/30/23 1336   Resp 19 05/30/23 1336   SpO2 94 % 05/30/23 1336           Post Anesthesia Care and Evaluation    Patient location during evaluation: PACU  Patient participation: complete - patient participated  Level of consciousness: awake  Pain score: 0  Pain management: adequate    Airway patency: patent  Anesthetic complications: No anesthetic complications  PONV Status: none  Cardiovascular status: hemodynamically stable  Respiratory status: nasal cannula  Hydration status: acceptable

## 2023-06-07 ENCOUNTER — INFUSION (OUTPATIENT)
Dept: ONCOLOGY | Facility: HOSPITAL | Age: 48
End: 2023-06-07
Payer: MEDICARE

## 2023-06-07 ENCOUNTER — OFFICE VISIT (OUTPATIENT)
Dept: SURGERY | Facility: CLINIC | Age: 48
End: 2023-06-07
Payer: MEDICARE

## 2023-06-07 ENCOUNTER — OFFICE VISIT (OUTPATIENT)
Dept: INFECTIOUS DISEASES | Facility: CLINIC | Age: 48
End: 2023-06-07
Payer: MEDICARE

## 2023-06-07 VITALS — HEIGHT: 71 IN | WEIGHT: 315 LBS | BODY MASS INDEX: 44.1 KG/M2

## 2023-06-07 VITALS
RESPIRATION RATE: 18 BRPM | OXYGEN SATURATION: 98 % | DIASTOLIC BLOOD PRESSURE: 59 MMHG | SYSTOLIC BLOOD PRESSURE: 100 MMHG | TEMPERATURE: 97.1 F | HEART RATE: 78 BPM

## 2023-06-07 VITALS
WEIGHT: 315 LBS | OXYGEN SATURATION: 100 % | HEIGHT: 71 IN | BODY MASS INDEX: 44.1 KG/M2 | TEMPERATURE: 97.6 F | HEART RATE: 71 BPM | SYSTOLIC BLOOD PRESSURE: 105 MMHG | DIASTOLIC BLOOD PRESSURE: 66 MMHG

## 2023-06-07 DIAGNOSIS — L89.154 DECUBITUS ULCER OF SACRAL REGION, STAGE 4: ICD-10-CM

## 2023-06-07 DIAGNOSIS — M86.60 CHRONIC OSTEOMYELITIS: Primary | ICD-10-CM

## 2023-06-07 DIAGNOSIS — M46.28 OSTEOMYELITIS OF VERTEBRA, SACRAL AND SACROCOCCYGEAL REGION: Primary | ICD-10-CM

## 2023-06-07 LAB
ALBUMIN SERPL-MCNC: 3.1 G/DL (ref 3.5–5.2)
ALBUMIN/GLOB SERPL: 0.7 G/DL
ALP SERPL-CCNC: 121 U/L (ref 39–117)
ALT SERPL W P-5'-P-CCNC: 23 U/L (ref 1–41)
ANION GAP SERPL CALCULATED.3IONS-SCNC: 13.8 MMOL/L (ref 5–15)
AST SERPL-CCNC: 18 U/L (ref 1–40)
BILIRUB SERPL-MCNC: 0.2 MG/DL (ref 0–1.2)
BUN SERPL-MCNC: 22 MG/DL (ref 6–20)
BUN/CREAT SERPL: 20 (ref 7–25)
CALCIUM SPEC-SCNC: 8.9 MG/DL (ref 8.6–10.5)
CHLORIDE SERPL-SCNC: 99 MMOL/L (ref 98–107)
CK SERPL-CCNC: 23 U/L (ref 20–200)
CO2 SERPL-SCNC: 23.2 MMOL/L (ref 22–29)
CREAT SERPL-MCNC: 1.1 MG/DL (ref 0.76–1.27)
CRP SERPL-MCNC: 13.31 MG/DL (ref 0–0.5)
DEPRECATED RDW RBC AUTO: 55.2 FL (ref 37–54)
EGFRCR SERPLBLD CKD-EPI 2021: 84.4 ML/MIN/1.73
ERYTHROCYTE [DISTWIDTH] IN BLOOD BY AUTOMATED COUNT: 18.1 % (ref 12.3–15.4)
GLOBULIN UR ELPH-MCNC: 4.2 GM/DL
GLUCOSE SERPL-MCNC: 168 MG/DL (ref 65–99)
HCT VFR BLD AUTO: 32.7 % (ref 37.5–51)
HGB BLD-MCNC: 9 G/DL (ref 13–17.7)
MCH RBC QN AUTO: 23.1 PG (ref 26.6–33)
MCHC RBC AUTO-ENTMCNC: 27.5 G/DL (ref 31.5–35.7)
MCV RBC AUTO: 84.1 FL (ref 79–97)
PLATELET # BLD AUTO: 438 10*3/MM3 (ref 140–450)
PMV BLD AUTO: 9.4 FL (ref 6–12)
POTASSIUM SERPL-SCNC: 3.8 MMOL/L (ref 3.5–5.2)
PROT SERPL-MCNC: 7.3 G/DL (ref 6–8.5)
RBC # BLD AUTO: 3.89 10*6/MM3 (ref 4.14–5.8)
SODIUM SERPL-SCNC: 136 MMOL/L (ref 136–145)
WBC NRBC COR # BLD: 13.62 10*3/MM3 (ref 3.4–10.8)

## 2023-06-07 PROCEDURE — 85027 COMPLETE CBC AUTOMATED: CPT | Performed by: NURSE PRACTITIONER

## 2023-06-07 PROCEDURE — 82550 ASSAY OF CK (CPK): CPT | Performed by: NURSE PRACTITIONER

## 2023-06-07 PROCEDURE — 86140 C-REACTIVE PROTEIN: CPT | Performed by: NURSE PRACTITIONER

## 2023-06-07 PROCEDURE — 96365 THER/PROPH/DIAG IV INF INIT: CPT

## 2023-06-07 PROCEDURE — 80053 COMPREHEN METABOLIC PANEL: CPT | Performed by: NURSE PRACTITIONER

## 2023-06-07 PROCEDURE — 25010000002 DALBAVANCIN 500 MG RECONSTITUTED SOLUTION 1 EACH VIAL: Performed by: NURSE PRACTITIONER

## 2023-06-07 PROCEDURE — 96367 TX/PROPH/DG ADDL SEQ IV INF: CPT

## 2023-06-07 PROCEDURE — 25010000002 CEFTRIAXONE PER 250 MG: Performed by: NURSE PRACTITIONER

## 2023-06-07 PROCEDURE — 99214 OFFICE O/P EST MOD 30 MIN: CPT | Performed by: NURSE PRACTITIONER

## 2023-06-07 RX ORDER — DEXTROSE MONOHYDRATE 50 MG/ML
100 INJECTION, SOLUTION INTRAVENOUS ONCE
Status: COMPLETED | OUTPATIENT
Start: 2023-06-07 | End: 2023-06-07

## 2023-06-07 RX ORDER — DEXTROSE MONOHYDRATE 50 MG/ML
100 INJECTION, SOLUTION INTRAVENOUS ONCE
Start: 2023-06-08 | End: 2023-06-08

## 2023-06-07 RX ADMIN — SODIUM CHLORIDE 2 G: 9 INJECTION, SOLUTION INTRAVENOUS at 15:57

## 2023-06-07 RX ADMIN — DALBAVANCIN 1500 MG: 500 INJECTION, POWDER, FOR SOLUTION INTRAVENOUS at 16:34

## 2023-06-07 RX ADMIN — DEXTROSE MONOHYDRATE 100 ML: 50 INJECTION, SOLUTION INTRAVENOUS at 16:34
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
## 2023-06-08 ENCOUNTER — HOSPITAL ENCOUNTER (OUTPATIENT)
Dept: WOUND CARE | Facility: HOSPITAL | Age: 48
Discharge: HOME | End: 2023-06-08
Payer: MEDICARE

## 2023-06-08 VITALS
TEMPERATURE: 98.5 F | RESPIRATION RATE: 18 BRPM | SYSTOLIC BLOOD PRESSURE: 112 MMHG | DIASTOLIC BLOOD PRESSURE: 76 MMHG | HEART RATE: 73 BPM

## 2023-06-08 DIAGNOSIS — L89.310 PRESSURE INJURY OF RIGHT BUTTOCK, UNSTAGEABLE: ICD-10-CM

## 2023-06-08 DIAGNOSIS — L89.002 PRESSURE INJURY OF ELBOW, STAGE 2, UNSPECIFIED LATERALITY: Primary | ICD-10-CM

## 2023-06-08 PROCEDURE — 87205 SMEAR GRAM STAIN: CPT | Performed by: NURSE PRACTITIONER

## 2023-06-08 PROCEDURE — 63710000001 CLOTRIMAZOLE 1 % CREAM: Performed by: NURSE PRACTITIONER

## 2023-06-08 PROCEDURE — A9270 NON-COVERED ITEM OR SERVICE: HCPCS | Performed by: NURSE PRACTITIONER

## 2023-06-08 PROCEDURE — 87176 TISSUE HOMOGENIZATION CULTR: CPT | Performed by: NURSE PRACTITIONER

## 2023-06-08 PROCEDURE — 87070 CULTURE OTHR SPECIMN AEROBIC: CPT | Performed by: NURSE PRACTITIONER

## 2023-06-08 PROCEDURE — 63710000001 COLLAGENASE 250 UNIT/GM OINTMENT 30 G TUBE: Performed by: NURSE PRACTITIONER

## 2023-06-08 PROCEDURE — 63710000001 ALUMINUM-MAGNESIUM HYDROXIDE-SIMETHICONE 200-200-20 MG/5ML SUSPENSION: Performed by: NURSE PRACTITIONER

## 2023-06-08 PROCEDURE — 63710000001 ZINC OXIDE 20 % OINTMENT: Performed by: NURSE PRACTITIONER

## 2023-06-08 PROCEDURE — 63710000001 A+D PREVENT OINTMENT: Performed by: NURSE PRACTITIONER

## 2023-06-08 PROCEDURE — 63710000001 SODIUM HYPOCHLORITE 0.25 % SOLUTION 473 ML BOTTLE: Performed by: NURSE PRACTITIONER

## 2023-06-08 RX ORDER — CASTOR OIL AND BALSAM, PERU 788; 87 MG/G; MG/G
1 OINTMENT TOPICAL AS NEEDED
OUTPATIENT
Start: 2023-06-08

## 2023-06-08 RX ORDER — SODIUM HYPOCHLORITE 1.25 MG/ML
1 SOLUTION TOPICAL AS NEEDED
OUTPATIENT
Start: 2023-06-08

## 2023-06-08 RX ORDER — LIDOCAINE HYDROCHLORIDE 20 MG/ML
JELLY TOPICAL AS NEEDED
OUTPATIENT
Start: 2023-06-08

## 2023-06-08 RX ORDER — LIDOCAINE HYDROCHLORIDE 20 MG/ML
JELLY TOPICAL AS NEEDED
Start: 2023-06-08

## 2023-06-08 RX ORDER — SODIUM HYPOCHLORITE 2.5 MG/ML
1 SOLUTION TOPICAL AS NEEDED
Status: DISCONTINUED | OUTPATIENT
Start: 2023-06-08 | End: 2023-06-09 | Stop reason: HOSPADM

## 2023-06-08 RX ORDER — SODIUM HYPOCHLORITE 2.5 MG/ML
1 SOLUTION TOPICAL AS NEEDED
Status: CANCELLED | OUTPATIENT
Start: 2023-06-08

## 2023-06-08 RX ADMIN — COLLAGENASE SANTYL 1 APPLICATION: 250 OINTMENT TOPICAL at 14:57

## 2023-06-08 RX ADMIN — SODIUM HYPOCHLORITE 473 ML: 2.5 SOLUTION TOPICAL at 14:57

## 2023-06-08 NOTE — LETTER
Ken Krueger 1975    Coccyx- Apply santyl to sacral wound to the left side, pack with hydrgel moistened gauze cover with silicone border dressing.  Right and left gluteal and scrotal wound - cleanse with normal saline, pack with hydrogel moistened gauze cover with silicone border       Change daily.    Cathie Handy APRN

## 2023-06-10 LAB
BACTERIA SPEC AEROBE CULT: ABNORMAL
GRAM STN SPEC: ABNORMAL

## 2023-06-14 ENCOUNTER — INFUSION (OUTPATIENT)
Dept: ONCOLOGY | Facility: HOSPITAL | Age: 48
End: 2023-06-14
Payer: MEDICARE

## 2023-06-14 ENCOUNTER — OFFICE VISIT (OUTPATIENT)
Dept: INFECTIOUS DISEASES | Facility: CLINIC | Age: 48
End: 2023-06-14
Payer: MEDICARE

## 2023-06-14 VITALS
HEART RATE: 74 BPM | SYSTOLIC BLOOD PRESSURE: 97 MMHG | TEMPERATURE: 97.3 F | OXYGEN SATURATION: 100 % | RESPIRATION RATE: 18 BRPM | DIASTOLIC BLOOD PRESSURE: 59 MMHG

## 2023-06-14 VITALS
HEART RATE: 76 BPM | WEIGHT: 315 LBS | BODY MASS INDEX: 44.1 KG/M2 | TEMPERATURE: 96.3 F | OXYGEN SATURATION: 97 % | HEIGHT: 71 IN

## 2023-06-14 DIAGNOSIS — M86.60 CHRONIC OSTEOMYELITIS: ICD-10-CM

## 2023-06-14 DIAGNOSIS — L89.154 DECUBITUS ULCER OF SACRAL REGION, STAGE 4: Primary | ICD-10-CM

## 2023-06-14 DIAGNOSIS — M46.28 OSTEOMYELITIS OF VERTEBRA, SACRAL AND SACROCOCCYGEAL REGION: Primary | ICD-10-CM

## 2023-06-14 PROCEDURE — 25010000002 DALBAVANCIN 500 MG RECONSTITUTED SOLUTION 1 EACH VIAL: Performed by: NURSE PRACTITIONER

## 2023-06-14 PROCEDURE — 96365 THER/PROPH/DIAG IV INF INIT: CPT

## 2023-06-14 RX ORDER — DEXTROSE MONOHYDRATE 50 MG/ML
100 INJECTION, SOLUTION INTRAVENOUS ONCE
Status: COMPLETED | OUTPATIENT
Start: 2023-06-14 | End: 2023-06-14

## 2023-06-14 RX ORDER — DEXTROSE MONOHYDRATE 50 MG/ML
100 INJECTION, SOLUTION INTRAVENOUS ONCE
Status: CANCELLED
Start: 2023-06-15 | End: 2023-06-15

## 2023-06-14 RX ADMIN — DALBAVANCIN 1500 MG: 500 INJECTION, POWDER, FOR SOLUTION INTRAVENOUS at 13:39

## 2023-06-14 RX ADMIN — DEXTROSE MONOHYDRATE 100 ML: 50 INJECTION, SOLUTION INTRAVENOUS at 13:38
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
## 2023-06-15 ENCOUNTER — HOSPITAL ENCOUNTER (OUTPATIENT)
Dept: WOUND CARE | Facility: HOSPITAL | Age: 48
Discharge: HOME | End: 2023-06-15
Payer: MEDICARE

## 2023-06-15 VITALS
HEART RATE: 72 BPM | RESPIRATION RATE: 17 BRPM | SYSTOLIC BLOOD PRESSURE: 112 MMHG | DIASTOLIC BLOOD PRESSURE: 68 MMHG | TEMPERATURE: 98.3 F

## 2023-06-15 DIAGNOSIS — L89.002 PRESSURE INJURY OF ELBOW, STAGE 2, UNSPECIFIED LATERALITY: Primary | ICD-10-CM

## 2023-06-15 DIAGNOSIS — L89.310 PRESSURE INJURY OF RIGHT BUTTOCK, UNSTAGEABLE: ICD-10-CM

## 2023-06-15 LAB
ALBUMIN SERPL-MCNC: 3.5 G/DL (ref 3.5–5.2)
ALBUMIN/GLOB SERPL: 0.9 G/DL
ALP SERPL-CCNC: 119 U/L (ref 39–117)
ALT SERPL-CCNC: 25 U/L (ref 1–41)
AST SERPL-CCNC: 22 U/L (ref 1–40)
BILIRUB SERPL-MCNC: <0.2 MG/DL (ref 0–1.2)
BUN SERPL-MCNC: 23 MG/DL (ref 6–20)
BUN/CREAT SERPL: 26.1 (ref 7–25)
CALCIUM SERPL-MCNC: 9.1 MG/DL (ref 8.6–10.5)
CHLORIDE SERPL-SCNC: 99 MMOL/L (ref 98–107)
CO2 SERPL-SCNC: 21.4 MMOL/L (ref 22–29)
CREAT SERPL-MCNC: 0.88 MG/DL (ref 0.76–1.27)
CRP SERPL-MCNC: 6.16 MG/DL (ref 0–0.5)
EGFRCR SERPLBLD CKD-EPI 2021: 108.1 ML/MIN/1.73
ERYTHROCYTE [DISTWIDTH] IN BLOOD BY AUTOMATED COUNT: 16.1 % (ref 12.3–15.4)
GLOBULIN SER CALC-MCNC: 4 GM/DL
GLUCOSE SERPL-MCNC: 359 MG/DL (ref 65–99)
HCT VFR BLD AUTO: 31 % (ref 37.5–51)
HGB BLD-MCNC: 9 G/DL (ref 13–17.7)
MCH RBC QN AUTO: 23 PG (ref 26.6–33)
MCHC RBC AUTO-ENTMCNC: 29 G/DL (ref 31.5–35.7)
MCV RBC AUTO: 79.3 FL (ref 79–97)
PLATELET # BLD AUTO: 464 10*3/MM3 (ref 140–450)
POTASSIUM SERPL-SCNC: 3.8 MMOL/L (ref 3.5–5.2)
PROT SERPL-MCNC: 7.5 G/DL (ref 6–8.5)
RBC # BLD AUTO: 3.91 10*6/MM3 (ref 4.14–5.8)
SODIUM SERPL-SCNC: 133 MMOL/L (ref 136–145)
WBC # BLD AUTO: 10.34 10*3/MM3 (ref 3.4–10.8)

## 2023-06-15 PROCEDURE — A9270 NON-COVERED ITEM OR SERVICE: HCPCS | Performed by: NURSE PRACTITIONER

## 2023-06-15 PROCEDURE — 63710000001 SODIUM HYPOCHLORITE 0.25 % SOLUTION 473 ML BOTTLE: Performed by: NURSE PRACTITIONER

## 2023-06-15 RX ORDER — LIDOCAINE HYDROCHLORIDE 20 MG/ML
JELLY TOPICAL AS NEEDED
Start: 2023-06-15

## 2023-06-15 RX ORDER — CASTOR OIL AND BALSAM, PERU 788; 87 MG/G; MG/G
1 OINTMENT TOPICAL AS NEEDED
OUTPATIENT
Start: 2023-06-15

## 2023-06-15 RX ORDER — SODIUM HYPOCHLORITE 2.5 MG/ML
1 SOLUTION TOPICAL AS NEEDED
Status: DISCONTINUED | OUTPATIENT
Start: 2023-06-15 | End: 2023-06-16 | Stop reason: HOSPADM

## 2023-06-15 RX ORDER — SODIUM HYPOCHLORITE 2.5 MG/ML
1 SOLUTION TOPICAL AS NEEDED
OUTPATIENT
Start: 2023-06-15

## 2023-06-15 RX ORDER — LIDOCAINE HYDROCHLORIDE 20 MG/ML
JELLY TOPICAL AS NEEDED
OUTPATIENT
Start: 2023-06-15

## 2023-06-15 RX ORDER — SODIUM HYPOCHLORITE 1.25 MG/ML
1 SOLUTION TOPICAL AS NEEDED
OUTPATIENT
Start: 2023-06-15

## 2023-06-15 RX ADMIN — SODIUM HYPOCHLORITE 473 ML: 2.5 SOLUTION TOPICAL at 15:23

## 2023-06-15 NOTE — LETTER
Ken Krueger 1975      Wound Care  6/15/2023  Cleanse with wound , pat dry, dress as directed below:     Coccyx- Apply santyl to sacral wound to the left side, pack with hydrgel moistened gauze cover with silicone border dressing.  Right and left gluteal and scrotal wound - cleanse with normal saline, pack with hydrogel moistened gauze cover with silicone border         Change daily.     Cathie Handy APRN
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
## 2023-06-17 ENCOUNTER — HOSPITAL ENCOUNTER (EMERGENCY)
Facility: HOSPITAL | Age: 48
Discharge: HOME | End: 2023-06-18
Attending: EMERGENCY MEDICINE | Admitting: EMERGENCY MEDICINE

## 2023-06-17 DIAGNOSIS — T82.898A OCCLUSION OF PERIPHERALLY INSERTED CENTRAL CATHETER (PICC) LINE, INITIAL ENCOUNTER: Primary | ICD-10-CM

## 2023-06-17 PROCEDURE — 99283 EMERGENCY DEPT VISIT LOW MDM: CPT

## 2023-06-18 VITALS
SYSTOLIC BLOOD PRESSURE: 123 MMHG | HEART RATE: 82 BPM | DIASTOLIC BLOOD PRESSURE: 71 MMHG | RESPIRATION RATE: 19 BRPM | OXYGEN SATURATION: 98 % | WEIGHT: 315 LBS | TEMPERATURE: 98.5 F | BODY MASS INDEX: 44.1 KG/M2 | HEIGHT: 71 IN

## 2023-06-18 PROCEDURE — 96365 THER/PROPH/DIAG IV INF INIT: CPT

## 2023-06-18 PROCEDURE — 25010000002 CEFTRIAXONE PER 250 MG: Performed by: EMERGENCY MEDICINE

## 2023-06-18 PROCEDURE — 25010000002 ALTEPLASE 2 MG RECONSTITUTED SOLUTION 1 EACH VIAL: Performed by: EMERGENCY MEDICINE

## 2023-06-18 RX ORDER — SODIUM CHLORIDE 0.9 % (FLUSH) 0.9 %
10 SYRINGE (ML) INJECTION AS NEEDED
Status: DISCONTINUED | OUTPATIENT
Start: 2023-06-18 | End: 2023-06-18 | Stop reason: HOSPADM

## 2023-06-18 RX ADMIN — CEFTRIAXONE 2 G: 2 INJECTION, POWDER, FOR SOLUTION INTRAMUSCULAR; INTRAVENOUS at 04:59

## 2023-06-18 RX ADMIN — ALTEPLASE: 2.2 INJECTION, POWDER, LYOPHILIZED, FOR SOLUTION INTRAVENOUS at 02:46

## 2023-06-18 NOTE — ED NOTES
Attempted to flush and draw blood from pt's PICC line without success. I changed the line for the PICC during sterile procedure and dressing change, it did not work still.

## 2023-06-18 NOTE — DISCHARGE INSTRUCTIONS
Continue your IV Rocephin as prescribed.  Follow-up with Dr. Jain in the ID clinic in 2 to 3 days.  Return to the emergency department right away if symptoms worsen/any problems.

## 2023-06-18 NOTE — ED NOTES
PICC line flushed after pulling line approx 2cm out, line is secured with antimicrobial dressing and tape.

## 2023-06-18 NOTE — ED NOTES
PICC line does not pull blood or flush after administration of alteplase. Provider aware. Will reassess in 60 minutes per protocol.

## 2023-06-18 NOTE — ED NOTES
MEDICAL SCREENING:    Reason for Visit: Receives daily abx infusions. PICC line not working.    Patient initially seen in triage.  The patient was advised further evaluation and diagnostic testing will be needed, some of the treatment and testing will be initiated in the lobby in order to begin the process.  The patient will be returned to the waiting area for the time being and possibly be re-assessed by a subsequent ED provider.  The patient will be brought back to the treatment area in as timely manner as possible.      Cathie Moran, APRN  06/17/23 [id number]
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
## 2023-07-26 ENCOUNTER — OFFICE VISIT (OUTPATIENT)
Dept: INFECTIOUS DISEASES | Facility: CLINIC | Age: 48
End: 2023-07-26
Payer: MEDICARE

## 2023-07-26 VITALS
HEIGHT: 71 IN | DIASTOLIC BLOOD PRESSURE: 70 MMHG | BODY MASS INDEX: 44.1 KG/M2 | HEART RATE: 81 BPM | SYSTOLIC BLOOD PRESSURE: 104 MMHG | WEIGHT: 315 LBS | OXYGEN SATURATION: 96 % | TEMPERATURE: 98.1 F

## 2023-07-26 DIAGNOSIS — M86.60 CHRONIC OSTEOMYELITIS: Primary | ICD-10-CM

## 2023-07-26 LAB
ANION GAP SERPL CALCULATED.3IONS-SCNC: 15.8 MMOL/L (ref 5–15)
ANISOCYTOSIS BLD QL: NORMAL
BASOPHILS # BLD AUTO: 0.05 10*3/MM3 (ref 0–0.2)
BASOPHILS NFR BLD AUTO: 0.5 % (ref 0–1.5)
BUN SERPL-MCNC: 26 MG/DL (ref 6–20)
BUN/CREAT SERPL: 23.9 (ref 7–25)
CALCIUM SPEC-SCNC: 9.7 MG/DL (ref 8.6–10.5)
CHLORIDE SERPL-SCNC: 98 MMOL/L (ref 98–107)
CO2 SERPL-SCNC: 24.2 MMOL/L (ref 22–29)
CREAT SERPL-MCNC: 1.09 MG/DL (ref 0.76–1.27)
CRP SERPL-MCNC: 5.41 MG/DL (ref 0–0.5)
DEPRECATED RDW RBC AUTO: 57.4 FL (ref 37–54)
EGFRCR SERPLBLD CKD-EPI 2021: 85.3 ML/MIN/1.73
EOSINOPHIL # BLD AUTO: 0.19 10*3/MM3 (ref 0–0.4)
EOSINOPHIL NFR BLD AUTO: 1.7 % (ref 0.3–6.2)
ERYTHROCYTE [DISTWIDTH] IN BLOOD BY AUTOMATED COUNT: 18.1 % (ref 12.3–15.4)
GLUCOSE SERPL-MCNC: 188 MG/DL (ref 65–99)
HCT VFR BLD AUTO: 38.4 % (ref 37.5–51)
HGB BLD-MCNC: 10.7 G/DL (ref 13–17.7)
HYPOCHROMIA BLD QL: NORMAL
IMM GRANULOCYTES # BLD AUTO: 0.04 10*3/MM3 (ref 0–0.05)
IMM GRANULOCYTES NFR BLD AUTO: 0.4 % (ref 0–0.5)
LARGE PLATELETS: NORMAL
LYMPHOCYTES # BLD AUTO: 1.58 10*3/MM3 (ref 0.7–3.1)
LYMPHOCYTES NFR BLD AUTO: 14.5 % (ref 19.6–45.3)
MCH RBC QN AUTO: 24.2 PG (ref 26.6–33)
MCHC RBC AUTO-ENTMCNC: 27.9 G/DL (ref 31.5–35.7)
MCV RBC AUTO: 86.7 FL (ref 79–97)
MONOCYTES # BLD AUTO: 0.39 10*3/MM3 (ref 0.1–0.9)
MONOCYTES NFR BLD AUTO: 3.6 % (ref 5–12)
NEUTROPHILS NFR BLD AUTO: 79.3 % (ref 42.7–76)
NEUTROPHILS NFR BLD AUTO: 8.64 10*3/MM3 (ref 1.7–7)
NRBC BLD AUTO-RTO: 0 /100 WBC (ref 0–0.2)
PLATELET # BLD AUTO: 342 10*3/MM3 (ref 140–450)
PMV BLD AUTO: 10 FL (ref 6–12)
POTASSIUM SERPL-SCNC: 3.3 MMOL/L (ref 3.5–5.2)
RBC # BLD AUTO: 4.43 10*6/MM3 (ref 4.14–5.8)
SODIUM SERPL-SCNC: 138 MMOL/L (ref 136–145)
WBC NRBC COR # BLD: 10.89 10*3/MM3 (ref 3.4–10.8)

## 2023-07-26 PROCEDURE — 80048 BASIC METABOLIC PNL TOTAL CA: CPT | Performed by: NURSE PRACTITIONER

## 2023-07-26 PROCEDURE — 85025 COMPLETE CBC W/AUTO DIFF WBC: CPT | Performed by: NURSE PRACTITIONER

## 2023-07-26 PROCEDURE — 86140 C-REACTIVE PROTEIN: CPT | Performed by: NURSE PRACTITIONER

## 2023-07-26 PROCEDURE — 85007 BL SMEAR W/DIFF WBC COUNT: CPT | Performed by: NURSE PRACTITIONER
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
## 2023-07-28 RX ORDER — METOLAZONE 2.5 MG/1
2.5 TABLET ORAL 3 TIMES WEEKLY
Qty: 24 TABLET | Refills: 0 | Status: SHIPPED | OUTPATIENT
Start: 2023-07-28 | End: 2023-09-26

## 2023-07-28 RX ORDER — CARVEDILOL 12.5 MG/1
12.5 TABLET ORAL 2 TIMES DAILY WITH MEALS
Qty: 60 TABLET | Refills: 0 | Status: SHIPPED | OUTPATIENT
Start: 2023-07-28 | End: 2023-08-27

## 2023-07-28 RX ORDER — BUMETANIDE 2 MG/1
2 TABLET ORAL DAILY
Start: 2023-07-28

## 2023-07-28 RX ORDER — SPIRONOLACTONE 25 MG/1
25 TABLET ORAL DAILY
Qty: 30 TABLET | Refills: 0 | Status: SHIPPED | OUTPATIENT
Start: 2023-07-28 | End: 2023-08-27

## 2023-08-10 ENCOUNTER — HOSPITAL ENCOUNTER (OUTPATIENT)
Dept: CARDIOLOGY | Facility: HOSPITAL | Age: 48
Discharge: HOME | End: 2023-08-10
Payer: MEDICARE

## 2023-08-10 ENCOUNTER — HOSPITAL ENCOUNTER (OUTPATIENT)
Dept: WOUND CARE | Facility: HOSPITAL | Age: 48
Discharge: HOME | End: 2023-08-10
Payer: MEDICARE

## 2023-08-10 VITALS
OXYGEN SATURATION: 96 % | WEIGHT: 315 LBS | BODY MASS INDEX: 44.1 KG/M2 | SYSTOLIC BLOOD PRESSURE: 116 MMHG | HEIGHT: 71 IN | DIASTOLIC BLOOD PRESSURE: 70 MMHG | HEART RATE: 84 BPM

## 2023-08-10 VITALS
RESPIRATION RATE: 16 BRPM | TEMPERATURE: 98.3 F | HEART RATE: 80 BPM | DIASTOLIC BLOOD PRESSURE: 68 MMHG | SYSTOLIC BLOOD PRESSURE: 112 MMHG

## 2023-08-10 DIAGNOSIS — L89.310 PRESSURE INJURY OF RIGHT BUTTOCK, UNSTAGEABLE: ICD-10-CM

## 2023-08-10 DIAGNOSIS — I50.22 CHRONIC HFREF (HEART FAILURE WITH REDUCED EJECTION FRACTION): Primary | ICD-10-CM

## 2023-08-10 DIAGNOSIS — L89.002 PRESSURE INJURY OF ELBOW, STAGE 2, UNSPECIFIED LATERALITY: Primary | ICD-10-CM

## 2023-08-10 LAB
ANION GAP SERPL CALCULATED.3IONS-SCNC: 12 MMOL/L (ref 5–15)
BUN SERPL-MCNC: 16 MG/DL (ref 6–20)
BUN/CREAT SERPL: 21.6 (ref 7–25)
CALCIUM SPEC-SCNC: 9.1 MG/DL (ref 8.6–10.5)
CHLORIDE SERPL-SCNC: 108 MMOL/L (ref 98–107)
CO2 SERPL-SCNC: 23 MMOL/L (ref 22–29)
CREAT SERPL-MCNC: 0.74 MG/DL (ref 0.76–1.27)
EGFRCR SERPLBLD CKD-EPI 2021: 113.9 ML/MIN/1.73
GLUCOSE SERPL-MCNC: 156 MG/DL (ref 65–99)
MAGNESIUM SERPL-MCNC: 2 MG/DL (ref 1.6–2.6)
NT-PROBNP SERPL-MCNC: 1614 PG/ML (ref 0–450)
POTASSIUM SERPL-SCNC: 4.3 MMOL/L (ref 3.5–5.2)
SODIUM SERPL-SCNC: 143 MMOL/L (ref 136–145)

## 2023-08-10 PROCEDURE — 63710000001 ALUMINUM-MAGNESIUM HYDROXIDE-SIMETHICONE 200-200-20 MG/5ML SUSPENSION: Performed by: NURSE PRACTITIONER

## 2023-08-10 PROCEDURE — A9270 NON-COVERED ITEM OR SERVICE: HCPCS | Performed by: NURSE PRACTITIONER

## 2023-08-10 PROCEDURE — 80048 BASIC METABOLIC PNL TOTAL CA: CPT | Performed by: PHYSICIAN ASSISTANT

## 2023-08-10 PROCEDURE — 63710000001 ZINC OXIDE 20 % OINTMENT: Performed by: NURSE PRACTITIONER

## 2023-08-10 PROCEDURE — 83880 ASSAY OF NATRIURETIC PEPTIDE: CPT | Performed by: PHYSICIAN ASSISTANT

## 2023-08-10 PROCEDURE — 63710000001 A+D PREVENT OINTMENT: Performed by: NURSE PRACTITIONER

## 2023-08-10 PROCEDURE — 83735 ASSAY OF MAGNESIUM: CPT | Performed by: PHYSICIAN ASSISTANT

## 2023-08-10 PROCEDURE — 97602 WOUND(S) CARE NON-SELECTIVE: CPT

## 2023-08-10 PROCEDURE — 63710000001 CLOTRIMAZOLE 1 % CREAM: Performed by: NURSE PRACTITIONER

## 2023-08-10 RX ORDER — SACUBITRIL AND VALSARTAN 24; 26 MG/1; MG/1
1 TABLET, FILM COATED ORAL 2 TIMES DAILY
Qty: 180 TABLET | Refills: 1 | Status: SHIPPED | OUTPATIENT
Start: 2023-08-10 | End: 2023-08-10 | Stop reason: SDUPTHER

## 2023-08-10 RX ORDER — SPIRONOLACTONE 25 MG/1
25 TABLET ORAL DAILY
Qty: 30 TABLET | Refills: 0 | Status: SHIPPED | OUTPATIENT
Start: 2023-08-10 | End: 2023-09-09

## 2023-08-10 RX ORDER — SACUBITRIL AND VALSARTAN 24; 26 MG/1; MG/1
1 TABLET, FILM COATED ORAL 2 TIMES DAILY
Qty: 180 TABLET | Refills: 1 | Status: SHIPPED | OUTPATIENT
Start: 2023-08-10 | End: 2024-02-06

## 2023-08-10 RX ORDER — LIDOCAINE HYDROCHLORIDE 20 MG/ML
JELLY TOPICAL AS NEEDED
Start: 2023-08-10

## 2023-08-10 RX ORDER — CARVEDILOL 12.5 MG/1
12.5 TABLET ORAL 2 TIMES DAILY WITH MEALS
Qty: 60 TABLET | Refills: 0 | Status: SHIPPED | OUTPATIENT
Start: 2023-08-10 | End: 2023-09-09

## 2023-08-10 RX ORDER — SODIUM HYPOCHLORITE 2.5 MG/ML
1 SOLUTION TOPICAL AS NEEDED
OUTPATIENT
Start: 2023-08-10

## 2023-08-10 RX ORDER — SODIUM HYPOCHLORITE 1.25 MG/ML
1 SOLUTION TOPICAL AS NEEDED
OUTPATIENT
Start: 2023-08-10

## 2023-08-10 RX ORDER — CASTOR OIL AND BALSAM, PERU 788; 87 MG/G; MG/G
1 OINTMENT TOPICAL AS NEEDED
OUTPATIENT
Start: 2023-08-10

## 2023-08-10 RX ORDER — METOLAZONE 2.5 MG/1
2.5 TABLET ORAL 3 TIMES WEEKLY
Qty: 24 TABLET | Refills: 0 | Status: SHIPPED | OUTPATIENT
Start: 2023-08-11 | End: 2023-10-10

## 2023-08-10 RX ORDER — LIDOCAINE HYDROCHLORIDE 20 MG/ML
JELLY TOPICAL AS NEEDED
OUTPATIENT
Start: 2023-08-10

## 2023-08-10 NOTE — PROGRESS NOTES
[facility]    Date: 08/10/23     Vitals:    08/10/23 1439   BP: 116/70   Pulse: 84   SpO2: 96%        Method of arrival: Other     Weighing self daily: No    Monitoring Heart Failure Zones: Most days    Today's HF Zone: Yellow     Taking medications as prescribed: Yes    Edema Yes    Shortness of Air: Yes    Number of pillows used at night: medical bed    Educational Materials given:                                                                           ReDS Value:   Could not obtain      Cecelia Tello MA 08/10/23 14:40 EDT
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
## 2023-08-10 NOTE — LETTER
Ken Krueger 1975           Wound Care orders 8/10/2023        Right upper Gluteal: Cleanse with normal saline. Pat dry. Pack with Honey gel moistened gauze and cover with Super absorbant dressing.  Change daily.     Right Lower Gluteal:Cleanse with normal saline. Pat dry. Pack with Honey gel moistened gauze and cover with Super absorbant dressing.  Change daily.     Left upper Gluteal: Cleanse with normal saline. Pat dry. Pack with Honey gel moistened gauze and cover with Super absorbant dressing.  Change daily.     Left lower Gluteal: Cleanse with normal saline. Pat dry. Pack with Honey gel moistened gauze and cover with Super absorbant dressing.  Change daily.     Lower back wound: Cleanse with normal saline, Apply honey gel, cover with a telfa dressing.     Left trochanter: Cleanse with normal saline, Apply honey gel, cover with a telfa dressing.     Right shin: Cleanse with normal saline, Apply honey gel, cover with a telfa dressing.     Right ankle AND scattered scab areas: Cleanse with wound cleanser, Pat dry, Apply Betadine, cover with Meplix. Change 3 times a week.      Cathie BLANC
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
## 2023-08-30 ENCOUNTER — OFFICE VISIT (OUTPATIENT)
Dept: INFECTIOUS DISEASES | Facility: CLINIC | Age: 48
End: 2023-08-30
Payer: MEDICARE

## 2023-08-30 VITALS
HEART RATE: 79 BPM | TEMPERATURE: 96.3 F | OXYGEN SATURATION: 100 % | DIASTOLIC BLOOD PRESSURE: 63 MMHG | HEIGHT: 71 IN | SYSTOLIC BLOOD PRESSURE: 105 MMHG | BODY MASS INDEX: 44.35 KG/M2

## 2023-08-30 DIAGNOSIS — M46.28 OSTEOMYELITIS OF VERTEBRA, SACRAL AND SACROCOCCYGEAL REGION: ICD-10-CM

## 2023-08-30 DIAGNOSIS — L89.154 DECUBITUS ULCER OF SACRAL REGION, STAGE 4: ICD-10-CM

## 2023-08-30 DIAGNOSIS — M86.60 CHRONIC OSTEOMYELITIS: Primary | ICD-10-CM

## 2023-08-30 DIAGNOSIS — L89.314 PRESSURE INJURY OF RIGHT BUTTOCK, STAGE 4: ICD-10-CM

## 2023-08-30 LAB
CRP SERPL-MCNC: 8.49 MG/DL (ref 0–0.5)
DEPRECATED RDW RBC AUTO: 53.5 FL (ref 37–54)
ERYTHROCYTE [DISTWIDTH] IN BLOOD BY AUTOMATED COUNT: 16.6 % (ref 12.3–15.4)
HCT VFR BLD AUTO: 37.8 % (ref 37.5–51)
HGB BLD-MCNC: 10.7 G/DL (ref 13–17.7)
MCH RBC QN AUTO: 25 PG (ref 26.6–33)
MCHC RBC AUTO-ENTMCNC: 28.3 G/DL (ref 31.5–35.7)
MCV RBC AUTO: 88.3 FL (ref 79–97)
PLATELET # BLD AUTO: 338 10*3/MM3 (ref 140–450)
PMV BLD AUTO: 9.9 FL (ref 6–12)
RBC # BLD AUTO: 4.28 10*6/MM3 (ref 4.14–5.8)
WBC NRBC COR # BLD: 9.81 10*3/MM3 (ref 3.4–10.8)

## 2023-08-30 PROCEDURE — 99213 OFFICE O/P EST LOW 20 MIN: CPT | Performed by: NURSE PRACTITIONER

## 2023-08-30 PROCEDURE — 85027 COMPLETE CBC AUTOMATED: CPT | Performed by: NURSE PRACTITIONER

## 2023-08-30 PROCEDURE — 1160F RVW MEDS BY RX/DR IN RCRD: CPT | Performed by: NURSE PRACTITIONER

## 2023-08-30 PROCEDURE — 1159F MED LIST DOCD IN RCRD: CPT | Performed by: NURSE PRACTITIONER

## 2023-08-30 PROCEDURE — 86140 C-REACTIVE PROTEIN: CPT | Performed by: NURSE PRACTITIONER
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
## 2023-09-07 RX ORDER — SPIRONOLACTONE 25 MG/1
25 TABLET ORAL DAILY
Qty: 30 TABLET | Refills: 2 | Status: SHIPPED | OUTPATIENT
Start: 2023-09-07 | End: 2023-10-07

## 2023-09-07 RX ORDER — BUMETANIDE 2 MG/1
2 TABLET ORAL DAILY
Qty: 30 TABLET | Refills: 2 | Status: SHIPPED | OUTPATIENT
Start: 2023-09-07 | End: 2023-12-06

## 2023-09-13 ENCOUNTER — HOSPITAL ENCOUNTER (OUTPATIENT)
Dept: WOUND CARE | Facility: HOSPITAL | Age: 48
Discharge: HOME | End: 2023-09-13
Payer: MEDICARE

## 2023-09-13 VITALS
DIASTOLIC BLOOD PRESSURE: 70 MMHG | SYSTOLIC BLOOD PRESSURE: 106 MMHG | RESPIRATION RATE: 16 BRPM | HEART RATE: 68 BPM | TEMPERATURE: 98.5 F | WEIGHT: 315 LBS | BODY MASS INDEX: 44.1 KG/M2 | HEIGHT: 71 IN

## 2023-09-13 DIAGNOSIS — L89.310 PRESSURE INJURY OF RIGHT BUTTOCK, UNSTAGEABLE: ICD-10-CM

## 2023-09-13 DIAGNOSIS — L89.002 PRESSURE INJURY OF ELBOW, STAGE 2, UNSPECIFIED LATERALITY: Primary | ICD-10-CM

## 2023-09-13 PROCEDURE — 97602 WOUND(S) CARE NON-SELECTIVE: CPT

## 2023-09-13 RX ORDER — CASTOR OIL AND BALSAM, PERU 788; 87 MG/G; MG/G
1 OINTMENT TOPICAL AS NEEDED
OUTPATIENT
Start: 2023-09-13

## 2023-09-13 RX ORDER — SODIUM HYPOCHLORITE 1.25 MG/ML
1 SOLUTION TOPICAL AS NEEDED
OUTPATIENT
Start: 2023-09-13

## 2023-09-13 RX ORDER — LIDOCAINE HYDROCHLORIDE 20 MG/ML
JELLY TOPICAL AS NEEDED
Start: 2023-09-13

## 2023-09-13 RX ORDER — LIDOCAINE HYDROCHLORIDE 20 MG/ML
JELLY TOPICAL AS NEEDED
OUTPATIENT
Start: 2023-09-13

## 2023-09-13 RX ORDER — SODIUM HYPOCHLORITE 2.5 MG/ML
1 SOLUTION TOPICAL AS NEEDED
OUTPATIENT
Start: 2023-09-13

## 2023-09-13 NOTE — LETTER
Ken Krueger 1975           Wound Care orders 9/13/2023        Right upper Gluteal: Cleanse with normal saline. Pat dry. Pack with Honey gel moistened gauze and cover with Super absorbant dressing.  Change daily.     Right Lower Gluteal:Cleanse with normal saline. Pat dry. Pack with Honey gel moistened gauze and cover with Super absorbant dressing.  Change daily.     Left upper Gluteal: Cleanse with normal saline. Pat dry. Pack with Honey gel moistened gauze and cover with Super absorbant dressing.  Change daily.     Left lower Gluteal: Cleanse with normal saline. Pat dry. Pack with Honey gel moistened gauze and cover with Super absorbant dressing.  Change daily.     Lower back wound: Cleanse with normal saline, Apply honey gel, cover with a telfa dressing.     Left trochanter: Cleanse with normal saline, Apply honey gel, cover with a telfa dressing.     Right shin: Cleanse with normal saline, Apply honey gel, cover with a telfa dressing.     Right ankle AND scattered scab areas: Cleanse with wound cleanser, Pat dry, Apply Betadine, cover with island dressing. Change 3 times a week.      Cathie BLANC
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
## 2023-09-27 ENCOUNTER — OFFICE VISIT (OUTPATIENT)
Dept: INFECTIOUS DISEASES | Facility: CLINIC | Age: 48
End: 2023-09-27
Payer: MEDICARE

## 2023-09-27 VITALS
BODY MASS INDEX: 44.1 KG/M2 | SYSTOLIC BLOOD PRESSURE: 116 MMHG | WEIGHT: 315 LBS | OXYGEN SATURATION: 100 % | HEIGHT: 71 IN | DIASTOLIC BLOOD PRESSURE: 70 MMHG | HEART RATE: 77 BPM

## 2023-09-27 DIAGNOSIS — M46.28 OSTEOMYELITIS OF VERTEBRA, SACRAL AND SACROCOCCYGEAL REGION: ICD-10-CM

## 2023-09-27 DIAGNOSIS — L89.154 DECUBITUS ULCER OF SACRAL REGION, STAGE 4: Primary | ICD-10-CM

## 2023-09-27 DIAGNOSIS — L89.314 PRESSURE INJURY OF RIGHT BUTTOCK, STAGE 4: ICD-10-CM

## 2023-09-27 LAB
DEPRECATED RDW RBC AUTO: 51.3 FL (ref 37–54)
ERYTHROCYTE [DISTWIDTH] IN BLOOD BY AUTOMATED COUNT: 15.9 % (ref 12.3–15.4)
HCT VFR BLD AUTO: 37.1 % (ref 37.5–51)
HGB BLD-MCNC: 10.4 G/DL (ref 13–17.7)
MCH RBC QN AUTO: 24.7 PG (ref 26.6–33)
MCHC RBC AUTO-ENTMCNC: 28 G/DL (ref 31.5–35.7)
MCV RBC AUTO: 88.1 FL (ref 79–97)
PLATELET # BLD AUTO: 381 10*3/MM3 (ref 140–450)
PMV BLD AUTO: 9.5 FL (ref 6–12)
RBC # BLD AUTO: 4.21 10*6/MM3 (ref 4.14–5.8)
WBC NRBC COR # BLD: 8.35 10*3/MM3 (ref 3.4–10.8)

## 2023-09-27 PROCEDURE — 1160F RVW MEDS BY RX/DR IN RCRD: CPT | Performed by: NURSE PRACTITIONER

## 2023-09-27 PROCEDURE — 86140 C-REACTIVE PROTEIN: CPT | Performed by: NURSE PRACTITIONER

## 2023-09-27 PROCEDURE — 85027 COMPLETE CBC AUTOMATED: CPT | Performed by: NURSE PRACTITIONER

## 2023-09-27 PROCEDURE — 99213 OFFICE O/P EST LOW 20 MIN: CPT | Performed by: NURSE PRACTITIONER

## 2023-09-27 PROCEDURE — 1159F MED LIST DOCD IN RCRD: CPT | Performed by: NURSE PRACTITIONER
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
## 2023-09-28 LAB — CRP SERPL-MCNC: 37 MG/L (ref 0–10)

## 2023-10-12 RX ORDER — VERICIGUAT 5 MG/1
5 TABLET, FILM COATED ORAL DAILY
Qty: 30 TABLET | Refills: 1 | Status: CANCELLED | OUTPATIENT
Start: 2023-10-12

## 2023-10-12 RX ORDER — METOLAZONE 2.5 MG/1
2.5 TABLET ORAL 3 TIMES WEEKLY
Qty: 24 TABLET | Refills: 0 | Status: SHIPPED | OUTPATIENT
Start: 2023-10-13 | End: 2023-10-13 | Stop reason: SDUPTHER

## 2023-10-12 NOTE — TELEPHONE ENCOUNTER
"  Patient called and reported worsening abdominal distension and felt he was \"retaining fluid\".  Attempted to work patient in to be seen today or tomorrow afternoon; he reports he would not be able to come until next Friday.     Scheduled Mr. Krueger for next Friday @ 2pm.  Instructed him to increase his Metolazone to 5mg 3x weekly rather than 2.5mg 3x weekly until we see patient next week.            Franchesca Valladares PA-C  [facility]  10/12/23  14:02 EDT        "

## 2023-10-13 RX ORDER — METOLAZONE 2.5 MG/1
5 TABLET ORAL 3 TIMES WEEKLY
Qty: 30 TABLET | Refills: 11 | Status: SHIPPED | OUTPATIENT
Start: 2023-10-13

## 2023-10-20 ENCOUNTER — HOSPITAL ENCOUNTER (OUTPATIENT)
Dept: WOUND CARE | Facility: HOSPITAL | Age: 48
Discharge: HOME | End: 2023-10-20
Payer: MEDICARE

## 2023-10-20 ENCOUNTER — HOSPITAL ENCOUNTER (OUTPATIENT)
Dept: CARDIOLOGY | Facility: HOSPITAL | Age: 48
Discharge: HOME | End: 2023-10-20
Payer: MEDICARE

## 2023-10-20 VITALS — OXYGEN SATURATION: 100 % | DIASTOLIC BLOOD PRESSURE: 66 MMHG | HEART RATE: 80 BPM | SYSTOLIC BLOOD PRESSURE: 98 MMHG

## 2023-10-20 VITALS
RESPIRATION RATE: 17 BRPM | DIASTOLIC BLOOD PRESSURE: 71 MMHG | HEART RATE: 80 BPM | SYSTOLIC BLOOD PRESSURE: 111 MMHG | TEMPERATURE: 98.4 F

## 2023-10-20 DIAGNOSIS — E87.6 ACUTE HYPOKALEMIA: ICD-10-CM

## 2023-10-20 DIAGNOSIS — L89.310 PRESSURE INJURY OF RIGHT BUTTOCK, UNSTAGEABLE: ICD-10-CM

## 2023-10-20 DIAGNOSIS — L89.002 PRESSURE INJURY OF ELBOW, STAGE 2, UNSPECIFIED LATERALITY: Primary | ICD-10-CM

## 2023-10-20 DIAGNOSIS — I50.22 CHRONIC HFREF (HEART FAILURE WITH REDUCED EJECTION FRACTION): Primary | ICD-10-CM

## 2023-10-20 LAB
ALBUMIN SERPL-MCNC: 3.7 G/DL (ref 3.5–5.2)
ALP SERPL-CCNC: 102 U/L (ref 39–117)
ALT SERPL W P-5'-P-CCNC: 11 U/L (ref 1–41)
ANION GAP SERPL CALCULATED.3IONS-SCNC: 11.3 MMOL/L (ref 5–15)
AST SERPL-CCNC: 12 U/L (ref 1–40)
BILIRUB CONJ SERPL-MCNC: <0.2 MG/DL (ref 0–0.3)
BILIRUB INDIRECT SERPL-MCNC: NORMAL MG/DL
BILIRUB SERPL-MCNC: 0.2 MG/DL (ref 0–1.2)
BUN SERPL-MCNC: 20 MG/DL (ref 6–20)
BUN/CREAT SERPL: 25.3 (ref 7–25)
CALCIUM SPEC-SCNC: 9.1 MG/DL (ref 8.6–10.5)
CHLORIDE SERPL-SCNC: 97 MMOL/L (ref 98–107)
CO2 SERPL-SCNC: 28.7 MMOL/L (ref 22–29)
CREAT SERPL-MCNC: 0.79 MG/DL (ref 0.76–1.27)
EGFRCR SERPLBLD CKD-EPI 2021: 111 ML/MIN/1.73
GLUCOSE SERPL-MCNC: 112 MG/DL (ref 65–99)
MAGNESIUM SERPL-MCNC: 1.5 MG/DL (ref 1.6–2.6)
NT-PROBNP SERPL-MCNC: 1526 PG/ML (ref 0–450)
POTASSIUM SERPL-SCNC: 2.8 MMOL/L (ref 3.5–5.2)
PROT SERPL-MCNC: 7.7 G/DL (ref 6–8.5)
SODIUM SERPL-SCNC: 137 MMOL/L (ref 136–145)

## 2023-10-20 PROCEDURE — 83880 ASSAY OF NATRIURETIC PEPTIDE: CPT | Performed by: PHYSICIAN ASSISTANT

## 2023-10-20 PROCEDURE — A9270 NON-COVERED ITEM OR SERVICE: HCPCS | Performed by: NURSE PRACTITIONER

## 2023-10-20 PROCEDURE — 63710000001 CLOTRIMAZOLE 1 % CREAM: Performed by: NURSE PRACTITIONER

## 2023-10-20 PROCEDURE — 80048 BASIC METABOLIC PNL TOTAL CA: CPT | Performed by: PHYSICIAN ASSISTANT

## 2023-10-20 PROCEDURE — 63710000001 A+D PREVENT OINTMENT: Performed by: NURSE PRACTITIONER

## 2023-10-20 PROCEDURE — 63710000001 ZINC OXIDE 20 % OINTMENT: Performed by: NURSE PRACTITIONER

## 2023-10-20 PROCEDURE — 97602 WOUND(S) CARE NON-SELECTIVE: CPT

## 2023-10-20 PROCEDURE — 80076 HEPATIC FUNCTION PANEL: CPT | Performed by: PHYSICIAN ASSISTANT

## 2023-10-20 PROCEDURE — 83735 ASSAY OF MAGNESIUM: CPT | Performed by: PHYSICIAN ASSISTANT

## 2023-10-20 PROCEDURE — 63710000001 COLLAGENASE 250 UNIT/GM OINTMENT 30 G TUBE: Performed by: NURSE PRACTITIONER

## 2023-10-20 PROCEDURE — 63710000001 ALUMINUM-MAGNESIUM HYDROXIDE-SIMETHICONE 200-200-20 MG/5ML SUSPENSION: Performed by: NURSE PRACTITIONER

## 2023-10-20 RX ORDER — LIDOCAINE HYDROCHLORIDE 20 MG/ML
JELLY TOPICAL AS NEEDED
Status: DISCONTINUED | OUTPATIENT
Start: 2023-10-20 | End: 2023-10-21 | Stop reason: HOSPADM

## 2023-10-20 RX ORDER — SODIUM HYPOCHLORITE 1.25 MG/ML
1 SOLUTION TOPICAL AS NEEDED
OUTPATIENT
Start: 2023-10-20

## 2023-10-20 RX ORDER — SPIRONOLACTONE 25 MG/1
25 TABLET ORAL DAILY
Qty: 30 TABLET | Refills: 2 | Status: SHIPPED | OUTPATIENT
Start: 2023-10-20 | End: 2023-11-19

## 2023-10-20 RX ORDER — SODIUM HYPOCHLORITE 2.5 MG/ML
1 SOLUTION TOPICAL AS NEEDED
OUTPATIENT
Start: 2023-10-20

## 2023-10-20 RX ORDER — POTASSIUM CHLORIDE 20 MEQ/1
40 TABLET, EXTENDED RELEASE ORAL 2 TIMES DAILY
Qty: 20 TABLET | Refills: 0 | Status: SHIPPED | OUTPATIENT
Start: 2023-10-20 | End: 2023-10-25

## 2023-10-20 RX ORDER — CASTOR OIL AND BALSAM, PERU 788; 87 MG/G; MG/G
1 OINTMENT TOPICAL AS NEEDED
OUTPATIENT
Start: 2023-10-20

## 2023-10-20 RX ORDER — LIDOCAINE HYDROCHLORIDE 20 MG/ML
JELLY TOPICAL AS NEEDED
Start: 2023-10-20

## 2023-10-20 RX ORDER — LIDOCAINE HYDROCHLORIDE 20 MG/ML
JELLY TOPICAL AS NEEDED
OUTPATIENT
Start: 2023-10-20

## 2023-10-20 RX ADMIN — LIDOCAINE HYDROCHLORIDE 6 ML: 20 JELLY TOPICAL at 15:49

## 2023-10-20 RX ADMIN — COLLAGENASE SANTYL 1 APPLICATION: 250 OINTMENT TOPICAL at 15:50
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
## 2023-10-20 NOTE — PROGRESS NOTES
Heart Failure Clinic    Date: 10/20/23     Vitals:    10/20/23 1429   BP: 98/66   Pulse: 80   SpO2: 100%        Method of arrival: Other power chair    Weighing self daily: No    Monitoring Heart Failure Zones: No    Today's HF Zone: Yellow     Taking medications as prescribed: Yes    Edema Yes-bloated    Shortness of Air: Yes    Number of pillows used at night:hospital bed    Educational Materials given:  moses Good RN 10/20/23 14:31 EDT
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
## 2023-10-31 RX ORDER — METHENAMINE HIPPURATE 1000 MG/1
1 TABLET ORAL 2 TIMES DAILY
Qty: 60 TABLET | Refills: 1 | OUTPATIENT
Start: 2023-10-31
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
## 2023-11-01 ENCOUNTER — OFFICE VISIT (OUTPATIENT)
Dept: INFECTIOUS DISEASES | Facility: CLINIC | Age: 48
End: 2023-11-01
Payer: MEDICARE

## 2023-11-01 VITALS
BODY MASS INDEX: 61.84 KG/M2 | OXYGEN SATURATION: 97 % | SYSTOLIC BLOOD PRESSURE: 106 MMHG | HEART RATE: 72 BPM | WEIGHT: 315 LBS | DIASTOLIC BLOOD PRESSURE: 65 MMHG | HEIGHT: 60 IN

## 2023-11-01 DIAGNOSIS — M46.28 OSTEOMYELITIS OF VERTEBRA, SACRAL AND SACROCOCCYGEAL REGION: Primary | ICD-10-CM

## 2023-11-01 LAB
ANISOCYTOSIS BLD QL: NORMAL
BASOPHILS # BLD AUTO: 0.02 10*3/MM3 (ref 0–0.2)
BASOPHILS NFR BLD AUTO: 0.2 % (ref 0–1.5)
BILIRUB UR QL STRIP: NEGATIVE
CLARITY UR: ABNORMAL
COLOR UR: YELLOW
CRP SERPL-MCNC: 5.21 MG/DL (ref 0–0.5)
DEPRECATED RDW RBC AUTO: 51.4 FL (ref 37–54)
EOSINOPHIL # BLD AUTO: 0.1 10*3/MM3 (ref 0–0.4)
EOSINOPHIL NFR BLD AUTO: 1.1 % (ref 0.3–6.2)
ERYTHROCYTE [DISTWIDTH] IN BLOOD BY AUTOMATED COUNT: 15.9 % (ref 12.3–15.4)
GLUCOSE UR STRIP-MCNC: NEGATIVE MG/DL
HCT VFR BLD AUTO: 38.3 % (ref 37.5–51)
HGB BLD-MCNC: 10.7 G/DL (ref 13–17.7)
HGB UR QL STRIP.AUTO: NEGATIVE
HYPOCHROMIA BLD QL: NORMAL
IMM GRANULOCYTES # BLD AUTO: 0.02 10*3/MM3 (ref 0–0.05)
IMM GRANULOCYTES NFR BLD AUTO: 0.2 % (ref 0–0.5)
KETONES UR QL STRIP: NEGATIVE
LEUKOCYTE ESTERASE UR QL STRIP.AUTO: NEGATIVE
LYMPHOCYTES # BLD AUTO: 1.22 10*3/MM3 (ref 0.7–3.1)
LYMPHOCYTES NFR BLD AUTO: 13.2 % (ref 19.6–45.3)
MCH RBC QN AUTO: 24.7 PG (ref 26.6–33)
MCHC RBC AUTO-ENTMCNC: 27.9 G/DL (ref 31.5–35.7)
MCV RBC AUTO: 88.5 FL (ref 79–97)
MONOCYTES # BLD AUTO: 0.29 10*3/MM3 (ref 0.1–0.9)
MONOCYTES NFR BLD AUTO: 3.1 % (ref 5–12)
NEUTROPHILS NFR BLD AUTO: 7.58 10*3/MM3 (ref 1.7–7)
NEUTROPHILS NFR BLD AUTO: 82.2 % (ref 42.7–76)
NITRITE UR QL STRIP: NEGATIVE
NRBC BLD AUTO-RTO: 0 /100 WBC (ref 0–0.2)
PH UR STRIP.AUTO: >=9 [PH] (ref 5–8)
PLAT MORPH BLD: NORMAL
PLATELET # BLD AUTO: 356 10*3/MM3 (ref 140–450)
PMV BLD AUTO: 9.9 FL (ref 6–12)
PROT UR QL STRIP: ABNORMAL
RBC # BLD AUTO: 4.33 10*6/MM3 (ref 4.14–5.8)
SP GR UR STRIP: 1.01 (ref 1–1.03)
UROBILINOGEN UR QL STRIP: ABNORMAL
WBC NRBC COR # BLD: 9.23 10*3/MM3 (ref 3.4–10.8)

## 2023-11-01 PROCEDURE — 81003 URINALYSIS AUTO W/O SCOPE: CPT | Performed by: NURSE PRACTITIONER

## 2023-11-01 PROCEDURE — 1160F RVW MEDS BY RX/DR IN RCRD: CPT | Performed by: NURSE PRACTITIONER

## 2023-11-01 PROCEDURE — 1159F MED LIST DOCD IN RCRD: CPT | Performed by: NURSE PRACTITIONER

## 2023-11-01 PROCEDURE — 85025 COMPLETE CBC W/AUTO DIFF WBC: CPT | Performed by: NURSE PRACTITIONER

## 2023-11-01 PROCEDURE — 86140 C-REACTIVE PROTEIN: CPT | Performed by: NURSE PRACTITIONER

## 2023-11-01 PROCEDURE — 99213 OFFICE O/P EST LOW 20 MIN: CPT | Performed by: NURSE PRACTITIONER

## 2023-11-01 PROCEDURE — 85007 BL SMEAR W/DIFF WBC COUNT: CPT | Performed by: NURSE PRACTITIONER
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
## 2023-11-29 ENCOUNTER — HOSPITAL ENCOUNTER (OUTPATIENT)
Dept: WOUND CARE | Facility: HOSPITAL | Age: 48
Discharge: HOME | End: 2023-11-29
Payer: MEDICARE

## 2023-11-29 VITALS
RESPIRATION RATE: 17 BRPM | TEMPERATURE: 98.3 F | HEART RATE: 102 BPM | SYSTOLIC BLOOD PRESSURE: 122 MMHG | DIASTOLIC BLOOD PRESSURE: 78 MMHG

## 2023-11-29 DIAGNOSIS — L89.310 PRESSURE INJURY OF RIGHT BUTTOCK, UNSTAGEABLE: Primary | ICD-10-CM

## 2023-11-29 DIAGNOSIS — L89.002 PRESSURE INJURY OF ELBOW, STAGE 2, UNSPECIFIED LATERALITY: ICD-10-CM

## 2023-11-29 PROCEDURE — 63710000001 ALUMINUM-MAGNESIUM HYDROXIDE-SIMETHICONE 200-200-20 MG/5ML SUSPENSION: Performed by: NURSE PRACTITIONER

## 2023-11-29 PROCEDURE — A9270 NON-COVERED ITEM OR SERVICE: HCPCS | Performed by: NURSE PRACTITIONER

## 2023-11-29 PROCEDURE — 63710000001 A+D PREVENT OINTMENT: Performed by: NURSE PRACTITIONER

## 2023-11-29 PROCEDURE — 63710000001 ZINC OXIDE 20 % OINTMENT: Performed by: NURSE PRACTITIONER

## 2023-11-29 PROCEDURE — 63710000001 CLOTRIMAZOLE 1 % CREAM: Performed by: NURSE PRACTITIONER

## 2023-11-29 PROCEDURE — 97602 WOUND(S) CARE NON-SELECTIVE: CPT

## 2023-12-07 RX ORDER — SPIRONOLACTONE 25 MG/1
25 TABLET ORAL DAILY
Qty: 30 TABLET | Refills: 2 | Status: SHIPPED | OUTPATIENT
Start: 2023-12-07 | End: 2023-12-07 | Stop reason: SDUPTHER

## 2023-12-07 RX ORDER — SPIRONOLACTONE 25 MG/1
25 TABLET ORAL DAILY
Qty: 30 TABLET | Refills: 2 | Status: SHIPPED | OUTPATIENT
Start: 2023-12-07 | End: 2024-01-06
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
## 2023-12-14 ENCOUNTER — HOSPITAL ENCOUNTER (OUTPATIENT)
Dept: CARDIOLOGY | Facility: HOSPITAL | Age: 48
Discharge: HOME | End: 2023-12-14
Admitting: PHYSICIAN ASSISTANT
Payer: MEDICARE

## 2023-12-14 VITALS
HEART RATE: 79 BPM | HEIGHT: 72 IN | DIASTOLIC BLOOD PRESSURE: 68 MMHG | WEIGHT: 315 LBS | OXYGEN SATURATION: 98 % | BODY MASS INDEX: 42.66 KG/M2 | SYSTOLIC BLOOD PRESSURE: 98 MMHG

## 2023-12-14 DIAGNOSIS — E11.69 TYPE 2 DIABETES MELLITUS WITH OTHER SPECIFIED COMPLICATION, WITHOUT LONG-TERM CURRENT USE OF INSULIN: ICD-10-CM

## 2023-12-14 DIAGNOSIS — G47.33 OSA ON CPAP: ICD-10-CM

## 2023-12-14 DIAGNOSIS — I50.22 CHRONIC HFREF (HEART FAILURE WITH REDUCED EJECTION FRACTION): Primary | ICD-10-CM

## 2023-12-14 LAB
ANION GAP SERPL CALCULATED.3IONS-SCNC: 11 MMOL/L (ref 5–15)
BUN SERPL-MCNC: 18 MG/DL (ref 6–20)
BUN/CREAT SERPL: 26.5 (ref 7–25)
CALCIUM SPEC-SCNC: 8.7 MG/DL (ref 8.6–10.5)
CHLORIDE SERPL-SCNC: 101 MMOL/L (ref 98–107)
CO2 SERPL-SCNC: 28 MMOL/L (ref 22–29)
CREAT SERPL-MCNC: 0.68 MG/DL (ref 0.76–1.27)
EGFRCR SERPLBLD CKD-EPI 2021: 116.1 ML/MIN/1.73
GLUCOSE SERPL-MCNC: 134 MG/DL (ref 65–99)
MAGNESIUM SERPL-MCNC: 2 MG/DL (ref 1.6–2.6)
NT-PROBNP SERPL-MCNC: 3730 PG/ML (ref 0–450)
POTASSIUM SERPL-SCNC: 3.5 MMOL/L (ref 3.5–5.2)
SODIUM SERPL-SCNC: 140 MMOL/L (ref 136–145)

## 2023-12-14 PROCEDURE — 83880 ASSAY OF NATRIURETIC PEPTIDE: CPT | Performed by: PHYSICIAN ASSISTANT

## 2023-12-14 PROCEDURE — 83735 ASSAY OF MAGNESIUM: CPT | Performed by: PHYSICIAN ASSISTANT

## 2023-12-14 PROCEDURE — 80048 BASIC METABOLIC PNL TOTAL CA: CPT | Performed by: PHYSICIAN ASSISTANT

## 2023-12-14 RX ORDER — CARVEDILOL 12.5 MG/1
12.5 TABLET ORAL 2 TIMES DAILY WITH MEALS
Qty: 60 TABLET | Refills: 2 | Status: SHIPPED | OUTPATIENT
Start: 2023-12-14 | End: 2024-01-13

## 2023-12-14 RX ORDER — BUMETANIDE 2 MG/1
2 TABLET ORAL DAILY
Qty: 30 TABLET | Refills: 2 | Status: SHIPPED | OUTPATIENT
Start: 2023-12-14 | End: 2024-03-13

## 2023-12-14 RX ORDER — BUMETANIDE 2 MG/1
2 TABLET ORAL DAILY
Qty: 30 TABLET | Refills: 2 | Status: SHIPPED | OUTPATIENT
Start: 2023-12-14 | End: 2023-12-14 | Stop reason: SDUPTHER

## 2023-12-14 RX ORDER — POTASSIUM CHLORIDE 750 MG/1
10 TABLET, FILM COATED, EXTENDED RELEASE ORAL DAILY
Qty: 30 TABLET | Refills: 2 | Status: SHIPPED | OUTPATIENT
Start: 2023-12-14 | End: 2024-03-13
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
## 2023-12-14 NOTE — PROGRESS NOTES
Heart Failure Clinic    Date: 12/14/23     Vitals:    12/14/23 1403   BP: 98/68   Pulse: 79   SpO2: 98%    Weight 326 stated    Method of arrival: Other power chair    Weighing self daily: No    Monitoring Heart Failure Zones: Most days    Today's HF Zone: Yellow     Taking medications as prescribed: Yes    Edema Yes    Shortness of Air: Yes    Number of pillows used at night:hospital bed    Educational Materials given:  moses Good RN 12/14/23 14:06 EST
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
## 2023-12-27 ENCOUNTER — OFFICE VISIT (OUTPATIENT)
Dept: INFECTIOUS DISEASES | Facility: CLINIC | Age: 48
End: 2023-12-27
Payer: MEDICARE

## 2023-12-27 VITALS
DIASTOLIC BLOOD PRESSURE: 54 MMHG | TEMPERATURE: 96.8 F | HEART RATE: 86 BPM | WEIGHT: 315 LBS | SYSTOLIC BLOOD PRESSURE: 98 MMHG | OXYGEN SATURATION: 96 % | HEIGHT: 71 IN | BODY MASS INDEX: 44.1 KG/M2

## 2023-12-27 DIAGNOSIS — M86.60 CHRONIC OSTEOMYELITIS: Primary | ICD-10-CM
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
## 2023-12-28 ENCOUNTER — HOSPITAL ENCOUNTER (OUTPATIENT)
Dept: WOUND CARE | Facility: HOSPITAL | Age: 48
Discharge: HOME | End: 2023-12-28
Payer: MEDICARE

## 2023-12-28 VITALS
DIASTOLIC BLOOD PRESSURE: 80 MMHG | TEMPERATURE: 98.1 F | RESPIRATION RATE: 16 BRPM | HEART RATE: 68 BPM | SYSTOLIC BLOOD PRESSURE: 123 MMHG

## 2023-12-28 DIAGNOSIS — L89.002 PRESSURE INJURY OF ELBOW, STAGE 2, UNSPECIFIED LATERALITY: Primary | ICD-10-CM

## 2023-12-28 LAB
CRP SERPL-MCNC: 9.73 MG/DL (ref 0–0.5)
ERYTHROCYTE [DISTWIDTH] IN BLOOD BY AUTOMATED COUNT: 15 % (ref 12.3–15.4)
HCT VFR BLD AUTO: 30.6 % (ref 37.5–51)
HGB BLD-MCNC: 8.9 G/DL (ref 13–17.7)
MCH RBC QN AUTO: 22.5 PG (ref 26.6–33)
MCHC RBC AUTO-ENTMCNC: 29.1 G/DL (ref 31.5–35.7)
MCV RBC AUTO: 77.5 FL (ref 79–97)
PLATELET # BLD AUTO: 389 10*3/MM3 (ref 140–450)
RBC # BLD AUTO: 3.95 10*6/MM3 (ref 4.14–5.8)
WBC # BLD AUTO: 8.58 10*3/MM3 (ref 3.4–10.8)

## 2023-12-28 PROCEDURE — 63710000001 ZINC OXIDE 20 % OINTMENT: Performed by: NURSE PRACTITIONER

## 2023-12-28 PROCEDURE — A9270 NON-COVERED ITEM OR SERVICE: HCPCS | Performed by: NURSE PRACTITIONER

## 2023-12-28 PROCEDURE — 87070 CULTURE OTHR SPECIMN AEROBIC: CPT | Performed by: NURSE PRACTITIONER

## 2023-12-28 PROCEDURE — 63710000001 A+D PREVENT OINTMENT: Performed by: NURSE PRACTITIONER

## 2023-12-28 PROCEDURE — 87186 SC STD MICRODIL/AGAR DIL: CPT | Performed by: NURSE PRACTITIONER

## 2023-12-28 PROCEDURE — 63710000001 ALUMINUM-MAGNESIUM HYDROXIDE-SIMETHICONE 200-200-20 MG/5ML SUSPENSION: Performed by: NURSE PRACTITIONER

## 2023-12-28 PROCEDURE — 97602 WOUND(S) CARE NON-SELECTIVE: CPT

## 2023-12-28 PROCEDURE — 87077 CULTURE AEROBIC IDENTIFY: CPT | Performed by: NURSE PRACTITIONER

## 2023-12-28 PROCEDURE — 63710000001 COLLAGENASE 250 UNIT/GM OINTMENT 30 G TUBE: Performed by: NURSE PRACTITIONER

## 2023-12-28 PROCEDURE — 87205 SMEAR GRAM STAIN: CPT | Performed by: NURSE PRACTITIONER

## 2023-12-28 PROCEDURE — 63710000001 CLOTRIMAZOLE 1 % CREAM: Performed by: NURSE PRACTITIONER

## 2023-12-28 RX ORDER — LIDOCAINE HYDROCHLORIDE AND EPINEPHRINE BITARTRATE 20; .01 MG/ML; MG/ML
10 INJECTION, SOLUTION SUBCUTANEOUS ONCE
OUTPATIENT
Start: 2023-12-28 | End: 2023-12-28

## 2023-12-28 RX ORDER — LIDOCAINE HYDROCHLORIDE 20 MG/ML
6 JELLY TOPICAL ONCE
OUTPATIENT
Start: 2023-12-28 | End: 2023-12-28

## 2023-12-28 RX ORDER — SODIUM HYPOCHLORITE 1.25 MG/ML
1 SOLUTION TOPICAL AS NEEDED
OUTPATIENT
Start: 2023-12-28

## 2023-12-28 RX ORDER — CASTOR OIL AND BALSAM, PERU 788; 87 MG/G; MG/G
1 OINTMENT TOPICAL AS NEEDED
OUTPATIENT
Start: 2023-12-28

## 2023-12-28 RX ORDER — SODIUM HYPOCHLORITE 2.5 MG/ML
1 SOLUTION TOPICAL AS NEEDED
Status: DISCONTINUED | OUTPATIENT
Start: 2023-12-28 | End: 2023-12-29 | Stop reason: HOSPADM

## 2023-12-28 RX ORDER — LIDOCAINE HYDROCHLORIDE 20 MG/ML
JELLY TOPICAL AS NEEDED
OUTPATIENT
Start: 2023-12-28

## 2023-12-28 RX ORDER — DIAPER,BRIEF,INFANT-TODD,DISP
1 EACH MISCELLANEOUS ONCE
OUTPATIENT
Start: 2023-12-28 | End: 2023-12-28

## 2023-12-28 RX ORDER — SODIUM HYPOCHLORITE 2.5 MG/ML
1 SOLUTION TOPICAL AS NEEDED
OUTPATIENT
Start: 2023-12-28

## 2023-12-28 RX ORDER — LIDOCAINE HYDROCHLORIDE 20 MG/ML
10 INJECTION, SOLUTION INFILTRATION; PERINEURAL ONCE
OUTPATIENT
Start: 2023-12-28 | End: 2023-12-28

## 2023-12-28 RX ADMIN — COLLAGENASE SANTYL 1 APPLICATION: 250 OINTMENT TOPICAL at 15:09

## 2023-12-31 LAB
BACTERIA SPEC AEROBE CULT: ABNORMAL
GRAM STN SPEC: ABNORMAL

## 2024-01-05 RX ORDER — AMOXICILLIN AND CLAVULANATE POTASSIUM 500; 125 MG/1; MG/1
1 TABLET, FILM COATED ORAL 3 TIMES DAILY
Qty: 21 TABLET | Refills: 0 | Status: SHIPPED | OUTPATIENT
Start: 2024-01-05 | End: 2024-01-12

## 2024-01-08 ENCOUNTER — HOSPITAL ENCOUNTER (INPATIENT)
Facility: HOSPITAL | Age: 49
LOS: 10 days | Discharge: NURSING, REHAB OR LONG-TERM CARE | End: 2024-01-19
Attending: STUDENT IN AN ORGANIZED HEALTH CARE EDUCATION/TRAINING PROGRAM | Admitting: INTERNAL MEDICINE
Payer: MEDICARE

## 2024-01-08 DIAGNOSIS — M86.9 OSTEOMYELITIS, UNSPECIFIED SITE, UNSPECIFIED TYPE: Primary | ICD-10-CM

## 2024-01-08 DIAGNOSIS — S31.000A WOUND OF SACRAL REGION, INITIAL ENCOUNTER: ICD-10-CM

## 2024-01-08 DIAGNOSIS — L89.44: ICD-10-CM

## 2024-01-08 DIAGNOSIS — E11.65 POORLY CONTROLLED DIABETES MELLITUS: ICD-10-CM

## 2024-01-08 LAB
ALBUMIN SERPL-MCNC: 3.4 G/DL (ref 3.5–5.2)
ALBUMIN/GLOB SERPL: 0.7 G/DL
ALP SERPL-CCNC: 144 U/L (ref 39–117)
ALT SERPL W P-5'-P-CCNC: 12 U/L (ref 1–41)
ANION GAP SERPL CALCULATED.3IONS-SCNC: 10.3 MMOL/L (ref 5–15)
ANISOCYTOSIS BLD QL: NORMAL
AST SERPL-CCNC: 10 U/L (ref 1–40)
BASOPHILS # BLD AUTO: 0.02 10*3/MM3 (ref 0–0.2)
BASOPHILS NFR BLD AUTO: 0.2 % (ref 0–1.5)
BILIRUB SERPL-MCNC: 0.3 MG/DL (ref 0–1.2)
BUN SERPL-MCNC: 22 MG/DL (ref 6–20)
BUN/CREAT SERPL: 22.7 (ref 7–25)
CALCIUM SPEC-SCNC: 9.2 MG/DL (ref 8.6–10.5)
CHLORIDE SERPL-SCNC: 99 MMOL/L (ref 98–107)
CO2 SERPL-SCNC: 24.7 MMOL/L (ref 22–29)
CREAT SERPL-MCNC: 0.97 MG/DL (ref 0.76–1.27)
CRP SERPL-MCNC: 15.63 MG/DL (ref 0–0.5)
DEPRECATED RDW RBC AUTO: 51.3 FL (ref 37–54)
EGFRCR SERPLBLD CKD-EPI 2021: 97.5 ML/MIN/1.73
EOSINOPHIL # BLD AUTO: 0.08 10*3/MM3 (ref 0–0.4)
EOSINOPHIL NFR BLD AUTO: 0.6 % (ref 0.3–6.2)
ERYTHROCYTE [DISTWIDTH] IN BLOOD BY AUTOMATED COUNT: 16.7 % (ref 12.3–15.4)
GLOBULIN UR ELPH-MCNC: 4.9 GM/DL
GLUCOSE SERPL-MCNC: 113 MG/DL (ref 65–99)
HCT VFR BLD AUTO: 32.5 % (ref 37.5–51)
HGB BLD-MCNC: 9 G/DL (ref 13–17.7)
IMM GRANULOCYTES # BLD AUTO: 0.04 10*3/MM3 (ref 0–0.05)
IMM GRANULOCYTES NFR BLD AUTO: 0.3 % (ref 0–0.5)
LARGE PLATELETS: NORMAL
LYMPHOCYTES # BLD AUTO: 1 10*3/MM3 (ref 0.7–3.1)
LYMPHOCYTES NFR BLD AUTO: 7.6 % (ref 19.6–45.3)
MCH RBC QN AUTO: 23.2 PG (ref 26.6–33)
MCHC RBC AUTO-ENTMCNC: 27.7 G/DL (ref 31.5–35.7)
MCV RBC AUTO: 83.8 FL (ref 79–97)
MONOCYTES # BLD AUTO: 0.4 10*3/MM3 (ref 0.1–0.9)
MONOCYTES NFR BLD AUTO: 3 % (ref 5–12)
NEUTROPHILS NFR BLD AUTO: 11.67 10*3/MM3 (ref 1.7–7)
NEUTROPHILS NFR BLD AUTO: 88.3 % (ref 42.7–76)
NRBC BLD AUTO-RTO: 0 /100 WBC (ref 0–0.2)
PLATELET # BLD AUTO: 468 10*3/MM3 (ref 140–450)
PMV BLD AUTO: 8.8 FL (ref 6–12)
POTASSIUM SERPL-SCNC: 4.2 MMOL/L (ref 3.5–5.2)
PROT SERPL-MCNC: 8.3 G/DL (ref 6–8.5)
RBC # BLD AUTO: 3.88 10*6/MM3 (ref 4.14–5.8)
SMALL PLATELETS BLD QL SMEAR: NORMAL
SODIUM SERPL-SCNC: 134 MMOL/L (ref 136–145)
WBC NRBC COR # BLD AUTO: 13.21 10*3/MM3 (ref 3.4–10.8)

## 2024-01-08 PROCEDURE — 36415 COLL VENOUS BLD VENIPUNCTURE: CPT

## 2024-01-08 PROCEDURE — 80053 COMPREHEN METABOLIC PANEL: CPT | Performed by: STUDENT IN AN ORGANIZED HEALTH CARE EDUCATION/TRAINING PROGRAM

## 2024-01-08 PROCEDURE — 81001 URINALYSIS AUTO W/SCOPE: CPT | Performed by: STUDENT IN AN ORGANIZED HEALTH CARE EDUCATION/TRAINING PROGRAM

## 2024-01-08 PROCEDURE — 25010000002 PIPERACILLIN SOD-TAZOBACTAM PER 1 G: Performed by: STUDENT IN AN ORGANIZED HEALTH CARE EDUCATION/TRAINING PROGRAM

## 2024-01-08 PROCEDURE — 87040 BLOOD CULTURE FOR BACTERIA: CPT | Performed by: STUDENT IN AN ORGANIZED HEALTH CARE EDUCATION/TRAINING PROGRAM

## 2024-01-08 PROCEDURE — 85025 COMPLETE CBC W/AUTO DIFF WBC: CPT | Performed by: STUDENT IN AN ORGANIZED HEALTH CARE EDUCATION/TRAINING PROGRAM

## 2024-01-08 PROCEDURE — 85007 BL SMEAR W/DIFF WBC COUNT: CPT | Performed by: STUDENT IN AN ORGANIZED HEALTH CARE EDUCATION/TRAINING PROGRAM

## 2024-01-08 PROCEDURE — 83605 ASSAY OF LACTIC ACID: CPT | Performed by: STUDENT IN AN ORGANIZED HEALTH CARE EDUCATION/TRAINING PROGRAM

## 2024-01-08 PROCEDURE — 86140 C-REACTIVE PROTEIN: CPT | Performed by: STUDENT IN AN ORGANIZED HEALTH CARE EDUCATION/TRAINING PROGRAM

## 2024-01-08 PROCEDURE — 99285 EMERGENCY DEPT VISIT HI MDM: CPT

## 2024-01-08 RX ORDER — SODIUM CHLORIDE 0.9 % (FLUSH) 0.9 %
10 SYRINGE (ML) INJECTION AS NEEDED
Status: DISCONTINUED | OUTPATIENT
Start: 2024-01-08 | End: 2024-01-19 | Stop reason: HOSPADM

## 2024-01-08 RX ADMIN — PIPERACILLIN SODIUM AND TAZOBACTAM SODIUM 3.38 G: 3; .375 INJECTION, POWDER, LYOPHILIZED, FOR SOLUTION INTRAVENOUS at 23:58
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
## 2024-01-08 NOTE — Clinical Note
[facility] EMERGENCY DEPARTMENT  [address] [place] [address]  Phone: [phone]    Pineda Krueger accompanied Ken Krueger to the emergency department on 1/8/2024. They may return to work on 01/10/2024.        Thank you for choosing [facility].    Christy Schwartz, SHAHNAZ

## 2024-01-09 ENCOUNTER — APPOINTMENT (OUTPATIENT)
Dept: CT IMAGING | Facility: HOSPITAL | Age: 49
End: 2024-01-09
Payer: MEDICARE

## 2024-01-09 PROBLEM — S31.000A SACRAL WOUND: Status: ACTIVE | Noted: 2024-01-09

## 2024-01-09 LAB
ALBUMIN SERPL-MCNC: 2.8 G/DL (ref 3.5–5.2)
ALBUMIN/GLOB SERPL: 0.6 G/DL
ALP SERPL-CCNC: 124 U/L (ref 39–117)
ALT SERPL W P-5'-P-CCNC: 12 U/L (ref 1–41)
ANION GAP SERPL CALCULATED.3IONS-SCNC: 10.3 MMOL/L (ref 5–15)
ANISOCYTOSIS BLD QL: NORMAL
AST SERPL-CCNC: 11 U/L (ref 1–40)
BACTERIA UR QL AUTO: ABNORMAL /HPF
BASOPHILS # BLD AUTO: 0.03 10*3/MM3 (ref 0–0.2)
BASOPHILS NFR BLD AUTO: 0.3 % (ref 0–1.5)
BILIRUB SERPL-MCNC: 0.3 MG/DL (ref 0–1.2)
BILIRUB UR QL STRIP: NEGATIVE
BUN SERPL-MCNC: 22 MG/DL (ref 6–20)
BUN/CREAT SERPL: 22.4 (ref 7–25)
CALCIUM SPEC-SCNC: 8.6 MG/DL (ref 8.6–10.5)
CHLORIDE SERPL-SCNC: 100 MMOL/L (ref 98–107)
CLARITY UR: ABNORMAL
CO2 SERPL-SCNC: 23.7 MMOL/L (ref 22–29)
COLOR UR: YELLOW
CREAT SERPL-MCNC: 0.98 MG/DL (ref 0.76–1.27)
D-LACTATE SERPL-SCNC: 2 MMOL/L (ref 0.5–2)
D-LACTATE SERPL-SCNC: 2.1 MMOL/L (ref 0.5–2)
D-LACTATE SERPL-SCNC: 2.3 MMOL/L (ref 0.5–2)
DEPRECATED RDW RBC AUTO: 50.6 FL (ref 37–54)
EGFRCR SERPLBLD CKD-EPI 2021: 96.3 ML/MIN/1.73
EOSINOPHIL # BLD AUTO: 0.13 10*3/MM3 (ref 0–0.4)
EOSINOPHIL NFR BLD AUTO: 1.1 % (ref 0.3–6.2)
ERYTHROCYTE [DISTWIDTH] IN BLOOD BY AUTOMATED COUNT: 16.6 % (ref 12.3–15.4)
GLOBULIN UR ELPH-MCNC: 4.5 GM/DL
GLUCOSE BLDC GLUCOMTR-MCNC: 107 MG/DL (ref 70–130)
GLUCOSE BLDC GLUCOMTR-MCNC: 126 MG/DL (ref 70–130)
GLUCOSE BLDC GLUCOMTR-MCNC: 126 MG/DL (ref 70–130)
GLUCOSE BLDC GLUCOMTR-MCNC: 170 MG/DL (ref 70–130)
GLUCOSE SERPL-MCNC: 126 MG/DL (ref 65–99)
GLUCOSE UR STRIP-MCNC: NEGATIVE MG/DL
HCT VFR BLD AUTO: 30 % (ref 37.5–51)
HGB BLD-MCNC: 8.3 G/DL (ref 13–17.7)
HGB UR QL STRIP.AUTO: ABNORMAL
HOLD SPECIMEN: NORMAL
HOLD SPECIMEN: NORMAL
HYALINE CASTS UR QL AUTO: ABNORMAL /LPF
HYPOCHROMIA BLD QL: NORMAL
IMM GRANULOCYTES # BLD AUTO: 0.04 10*3/MM3 (ref 0–0.05)
IMM GRANULOCYTES NFR BLD AUTO: 0.3 % (ref 0–0.5)
KETONES UR QL STRIP: NEGATIVE
LEUKOCYTE ESTERASE UR QL STRIP.AUTO: NEGATIVE
LYMPHOCYTES # BLD AUTO: 1.13 10*3/MM3 (ref 0.7–3.1)
LYMPHOCYTES NFR BLD AUTO: 9.7 % (ref 19.6–45.3)
MCH RBC QN AUTO: 23.1 PG (ref 26.6–33)
MCHC RBC AUTO-ENTMCNC: 27.7 G/DL (ref 31.5–35.7)
MCV RBC AUTO: 83.6 FL (ref 79–97)
MONOCYTES # BLD AUTO: 0.49 10*3/MM3 (ref 0.1–0.9)
MONOCYTES NFR BLD AUTO: 4.2 % (ref 5–12)
NEUTROPHILS NFR BLD AUTO: 84.4 % (ref 42.7–76)
NEUTROPHILS NFR BLD AUTO: 9.8 10*3/MM3 (ref 1.7–7)
NITRITE UR QL STRIP: POSITIVE
NRBC BLD AUTO-RTO: 0 /100 WBC (ref 0–0.2)
PH UR STRIP.AUTO: >=9 [PH] (ref 5–8)
PLAT MORPH BLD: NORMAL
PLATELET # BLD AUTO: 430 10*3/MM3 (ref 140–450)
PMV BLD AUTO: 9 FL (ref 6–12)
POTASSIUM SERPL-SCNC: 4.1 MMOL/L (ref 3.5–5.2)
PROT SERPL-MCNC: 7.3 G/DL (ref 6–8.5)
PROT UR QL STRIP: ABNORMAL
RBC # BLD AUTO: 3.59 10*6/MM3 (ref 4.14–5.8)
RBC # UR STRIP: ABNORMAL /HPF
REF LAB TEST METHOD: ABNORMAL
SODIUM SERPL-SCNC: 134 MMOL/L (ref 136–145)
SP GR UR STRIP: 1.01 (ref 1–1.03)
SQUAMOUS #/AREA URNS HPF: ABNORMAL /HPF
UROBILINOGEN UR QL STRIP: ABNORMAL
WBC # UR STRIP: ABNORMAL /HPF
WBC NRBC COR # BLD AUTO: 11.62 10*3/MM3 (ref 3.4–10.8)
WHOLE BLOOD HOLD COAG: NORMAL
WHOLE BLOOD HOLD SPECIMEN: NORMAL

## 2024-01-09 PROCEDURE — 25010000002 CEFEPIME PER 500 MG: Performed by: INTERNAL MEDICINE

## 2024-01-09 PROCEDURE — 94664 DEMO&/EVAL PT USE INHALER: CPT

## 2024-01-09 PROCEDURE — 99223 1ST HOSP IP/OBS HIGH 75: CPT

## 2024-01-09 PROCEDURE — 74177 CT ABD & PELVIS W/CONTRAST: CPT

## 2024-01-09 PROCEDURE — 63710000001 INSULIN LISPRO (HUMAN) PER 5 UNITS: Performed by: INTERNAL MEDICINE

## 2024-01-09 PROCEDURE — 74177 CT ABD & PELVIS W/CONTRAST: CPT | Performed by: RADIOLOGY

## 2024-01-09 PROCEDURE — 85007 BL SMEAR W/DIFF WBC COUNT: CPT | Performed by: INTERNAL MEDICINE

## 2024-01-09 PROCEDURE — 82948 REAGENT STRIP/BLOOD GLUCOSE: CPT

## 2024-01-09 PROCEDURE — 25010000002 VANCOMYCIN 5 G RECONSTITUTED SOLUTION: Performed by: STUDENT IN AN ORGANIZED HEALTH CARE EDUCATION/TRAINING PROGRAM

## 2024-01-09 PROCEDURE — 94640 AIRWAY INHALATION TREATMENT: CPT

## 2024-01-09 PROCEDURE — 94799 UNLISTED PULMONARY SVC/PX: CPT

## 2024-01-09 PROCEDURE — 99222 1ST HOSP IP/OBS MODERATE 55: CPT | Performed by: NURSE PRACTITIONER

## 2024-01-09 PROCEDURE — 25810000003 SODIUM CHLORIDE 0.9 % SOLUTION: Performed by: STUDENT IN AN ORGANIZED HEALTH CARE EDUCATION/TRAINING PROGRAM

## 2024-01-09 PROCEDURE — 85025 COMPLETE CBC W/AUTO DIFF WBC: CPT | Performed by: INTERNAL MEDICINE

## 2024-01-09 PROCEDURE — 80053 COMPREHEN METABOLIC PANEL: CPT | Performed by: INTERNAL MEDICINE

## 2024-01-09 PROCEDURE — 83605 ASSAY OF LACTIC ACID: CPT | Performed by: STUDENT IN AN ORGANIZED HEALTH CARE EDUCATION/TRAINING PROGRAM

## 2024-01-09 PROCEDURE — 25510000001 IOPAMIDOL 61 % SOLUTION: Performed by: STUDENT IN AN ORGANIZED HEALTH CARE EDUCATION/TRAINING PROGRAM

## 2024-01-09 PROCEDURE — 25810000003 SODIUM CHLORIDE 0.9 % SOLUTION: Performed by: INTERNAL MEDICINE

## 2024-01-09 PROCEDURE — 25010000002 CEFTRIAXONE PER 250 MG: Performed by: NURSE PRACTITIONER

## 2024-01-09 RX ORDER — MODAFINIL 100 MG/1
200 TABLET ORAL DAILY
Status: DISCONTINUED | OUTPATIENT
Start: 2024-01-09 | End: 2024-01-19 | Stop reason: HOSPADM

## 2024-01-09 RX ORDER — SPIRONOLACTONE 25 MG/1
25 TABLET ORAL DAILY
Status: DISCONTINUED | OUTPATIENT
Start: 2024-01-09 | End: 2024-01-19 | Stop reason: HOSPADM

## 2024-01-09 RX ORDER — METHENAMINE HIPPURATE 1000 MG/1
1 TABLET ORAL 2 TIMES DAILY WITH MEALS
Status: DISCONTINUED | OUTPATIENT
Start: 2024-01-09 | End: 2024-01-19 | Stop reason: HOSPADM

## 2024-01-09 RX ORDER — GLUCAGON 1 MG/ML
1 KIT INJECTION
Status: DISCONTINUED | OUTPATIENT
Start: 2024-01-09 | End: 2024-01-19 | Stop reason: HOSPADM

## 2024-01-09 RX ORDER — CARVEDILOL 6.25 MG/1
12.5 TABLET ORAL 2 TIMES DAILY WITH MEALS
Status: DISCONTINUED | OUTPATIENT
Start: 2024-01-09 | End: 2024-01-19 | Stop reason: HOSPADM

## 2024-01-09 RX ORDER — POLYETHYLENE GLYCOL 3350 17 G/17G
17 POWDER, FOR SOLUTION ORAL DAILY PRN
Status: DISCONTINUED | OUTPATIENT
Start: 2024-01-09 | End: 2024-01-19 | Stop reason: HOSPADM

## 2024-01-09 RX ORDER — POTASSIUM CHLORIDE 20 MEQ/1
10 TABLET, EXTENDED RELEASE ORAL DAILY
Status: DISCONTINUED | OUTPATIENT
Start: 2024-01-09 | End: 2024-01-19 | Stop reason: HOSPADM

## 2024-01-09 RX ORDER — FUROSEMIDE 20 MG/1
20 TABLET ORAL DAILY PRN
Status: ON HOLD | COMMUNITY

## 2024-01-09 RX ORDER — AMOXICILLIN AND CLAVULANATE POTASSIUM 500; 125 MG/1; MG/1
1 TABLET, FILM COATED ORAL 3 TIMES DAILY
Status: CANCELLED | OUTPATIENT
Start: 2024-01-09 | End: 2024-01-12

## 2024-01-09 RX ORDER — BISACODYL 5 MG/1
5 TABLET, DELAYED RELEASE ORAL DAILY PRN
Status: DISCONTINUED | OUTPATIENT
Start: 2024-01-09 | End: 2024-01-19 | Stop reason: HOSPADM

## 2024-01-09 RX ORDER — DANTROLENE SODIUM 25 MG/1
100 CAPSULE ORAL 3 TIMES DAILY
Status: DISCONTINUED | OUTPATIENT
Start: 2024-01-09 | End: 2024-01-19 | Stop reason: HOSPADM

## 2024-01-09 RX ORDER — SODIUM CHLORIDE 9 MG/ML
75 INJECTION, SOLUTION INTRAVENOUS CONTINUOUS
Status: DISCONTINUED | OUTPATIENT
Start: 2024-01-09 | End: 2024-01-15

## 2024-01-09 RX ORDER — ALBUTEROL SULFATE 1.25 MG/3ML
1 SOLUTION RESPIRATORY (INHALATION) EVERY 4 HOURS PRN
Status: CANCELLED | OUTPATIENT
Start: 2024-01-09

## 2024-01-09 RX ORDER — BUMETANIDE 1 MG/1
2 TABLET ORAL DAILY
Status: DISCONTINUED | OUTPATIENT
Start: 2024-01-09 | End: 2024-01-19 | Stop reason: HOSPADM

## 2024-01-09 RX ORDER — INSULIN LISPRO 100 [IU]/ML
2-7 INJECTION, SOLUTION INTRAVENOUS; SUBCUTANEOUS
Status: DISCONTINUED | OUTPATIENT
Start: 2024-01-09 | End: 2024-01-19 | Stop reason: HOSPADM

## 2024-01-09 RX ORDER — ARIPIPRAZOLE 10 MG/1
10 TABLET ORAL NIGHTLY
Status: DISCONTINUED | OUTPATIENT
Start: 2024-01-09 | End: 2024-01-19 | Stop reason: HOSPADM

## 2024-01-09 RX ORDER — DULOXETIN HYDROCHLORIDE 60 MG/1
60 CAPSULE, DELAYED RELEASE ORAL 2 TIMES DAILY
Status: DISCONTINUED | OUTPATIENT
Start: 2024-01-09 | End: 2024-01-19 | Stop reason: HOSPADM

## 2024-01-09 RX ORDER — NICOTINE POLACRILEX 4 MG
15 LOZENGE BUCCAL
Status: DISCONTINUED | OUTPATIENT
Start: 2024-01-09 | End: 2024-01-19 | Stop reason: HOSPADM

## 2024-01-09 RX ORDER — FUROSEMIDE 20 MG/1
20 TABLET ORAL DAILY PRN
Status: DISCONTINUED | OUTPATIENT
Start: 2024-01-09 | End: 2024-01-19 | Stop reason: HOSPADM

## 2024-01-09 RX ORDER — NITROGLYCERIN 0.4 MG/1
0.4 TABLET SUBLINGUAL
Status: DISCONTINUED | OUTPATIENT
Start: 2024-01-09 | End: 2024-01-19 | Stop reason: HOSPADM

## 2024-01-09 RX ORDER — SODIUM HYPOCHLORITE 1.25 MG/ML
SOLUTION TOPICAL 2 TIMES DAILY
Status: DISCONTINUED | OUTPATIENT
Start: 2024-01-09 | End: 2024-01-19

## 2024-01-09 RX ORDER — IPRATROPIUM BROMIDE AND ALBUTEROL SULFATE 2.5; .5 MG/3ML; MG/3ML
3 SOLUTION RESPIRATORY (INHALATION) EVERY 4 HOURS PRN
Status: DISCONTINUED | OUTPATIENT
Start: 2024-01-09 | End: 2024-01-16

## 2024-01-09 RX ORDER — GABAPENTIN 400 MG/1
800 CAPSULE ORAL EVERY 8 HOURS SCHEDULED
Status: DISCONTINUED | OUTPATIENT
Start: 2024-01-09 | End: 2024-01-16

## 2024-01-09 RX ORDER — ATORVASTATIN CALCIUM 10 MG/1
10 TABLET, FILM COATED ORAL NIGHTLY
Status: DISCONTINUED | OUTPATIENT
Start: 2024-01-09 | End: 2024-01-19 | Stop reason: HOSPADM

## 2024-01-09 RX ORDER — BACLOFEN 10 MG/1
30 TABLET ORAL
Status: DISCONTINUED | OUTPATIENT
Start: 2024-01-09 | End: 2024-01-19 | Stop reason: HOSPADM

## 2024-01-09 RX ORDER — FERROUS SULFATE 325(65) MG
325 TABLET ORAL 2 TIMES DAILY
Status: DISCONTINUED | OUTPATIENT
Start: 2024-01-09 | End: 2024-01-19 | Stop reason: HOSPADM

## 2024-01-09 RX ORDER — DEXTROSE MONOHYDRATE 25 G/50ML
25 INJECTION, SOLUTION INTRAVENOUS
Status: DISCONTINUED | OUTPATIENT
Start: 2024-01-09 | End: 2024-01-19 | Stop reason: HOSPADM

## 2024-01-09 RX ORDER — MULTIPLE VITAMINS W/ MINERALS TAB 9MG-400MCG
1 TAB ORAL DAILY
Status: DISCONTINUED | OUTPATIENT
Start: 2024-01-09 | End: 2024-01-19 | Stop reason: HOSPADM

## 2024-01-09 RX ORDER — METOLAZONE 2.5 MG/1
5 TABLET ORAL 3 TIMES WEEKLY
Status: DISCONTINUED | OUTPATIENT
Start: 2024-01-10 | End: 2024-01-19 | Stop reason: HOSPADM

## 2024-01-09 RX ORDER — DANTROLENE SODIUM 25 MG/1
100 CAPSULE ORAL 3 TIMES DAILY
Status: ON HOLD | COMMUNITY

## 2024-01-09 RX ORDER — SODIUM CHLORIDE 0.9 % (FLUSH) 0.9 %
10 SYRINGE (ML) INJECTION AS NEEDED
Status: DISCONTINUED | OUTPATIENT
Start: 2024-01-09 | End: 2024-01-19 | Stop reason: HOSPADM

## 2024-01-09 RX ORDER — PANTOPRAZOLE SODIUM 40 MG/1
40 TABLET, DELAYED RELEASE ORAL
Status: DISCONTINUED | OUTPATIENT
Start: 2024-01-09 | End: 2024-01-19 | Stop reason: HOSPADM

## 2024-01-09 RX ORDER — BISACODYL 10 MG
10 SUPPOSITORY, RECTAL RECTAL DAILY PRN
Status: DISCONTINUED | OUTPATIENT
Start: 2024-01-09 | End: 2024-01-19 | Stop reason: HOSPADM

## 2024-01-09 RX ORDER — TIZANIDINE 2 MG/1
2 TABLET ORAL 3 TIMES DAILY
COMMUNITY
End: 2024-01-19 | Stop reason: HOSPADM

## 2024-01-09 RX ORDER — TIZANIDINE 4 MG/1
2 TABLET ORAL 3 TIMES DAILY
Status: DISCONTINUED | OUTPATIENT
Start: 2024-01-09 | End: 2024-01-16

## 2024-01-09 RX ORDER — ALBUTEROL SULFATE 1.25 MG/3ML
1 SOLUTION RESPIRATORY (INHALATION) EVERY 4 HOURS PRN
COMMUNITY
End: 2024-01-19 | Stop reason: HOSPADM

## 2024-01-09 RX ORDER — SODIUM CHLORIDE 9 MG/ML
40 INJECTION, SOLUTION INTRAVENOUS AS NEEDED
Status: DISCONTINUED | OUTPATIENT
Start: 2024-01-09 | End: 2024-01-19 | Stop reason: HOSPADM

## 2024-01-09 RX ORDER — AMOXICILLIN 250 MG
2 CAPSULE ORAL 2 TIMES DAILY
Status: DISCONTINUED | OUTPATIENT
Start: 2024-01-09 | End: 2024-01-19 | Stop reason: HOSPADM

## 2024-01-09 RX ORDER — SODIUM CHLORIDE 0.9 % (FLUSH) 0.9 %
10 SYRINGE (ML) INJECTION EVERY 12 HOURS SCHEDULED
Status: DISCONTINUED | OUTPATIENT
Start: 2024-01-09 | End: 2024-01-19 | Stop reason: HOSPADM

## 2024-01-09 RX ADMIN — MODAFINIL 200 MG: 100 TABLET ORAL at 20:35

## 2024-01-09 RX ADMIN — Medication 10 ML: at 20:40

## 2024-01-09 RX ADMIN — INSULIN LISPRO 2 UNITS: 100 INJECTION, SOLUTION INTRAVENOUS; SUBCUTANEOUS at 20:47

## 2024-01-09 RX ADMIN — CARVEDILOL 12.5 MG: 6.25 TABLET, FILM COATED ORAL at 20:36

## 2024-01-09 RX ADMIN — SODIUM CHLORIDE 75 ML/HR: 9 INJECTION, SOLUTION INTRAVENOUS at 12:51

## 2024-01-09 RX ADMIN — DAKIN'S SOLUTION 0.125% (QUARTER STRENGTH): 0.12 SOLUTION at 20:41

## 2024-01-09 RX ADMIN — FERROUS SULFATE TAB 325 MG (65 MG ELEMENTAL FE) 325 MG: 325 (65 FE) TAB at 20:37

## 2024-01-09 RX ADMIN — DANTROLENE SODIUM 100 MG: 25 CAPSULE ORAL at 20:38

## 2024-01-09 RX ADMIN — IOPAMIDOL 80 ML: 612 INJECTION, SOLUTION INTRAVENOUS at 01:04

## 2024-01-09 RX ADMIN — Medication 1 TABLET: at 20:35

## 2024-01-09 RX ADMIN — GABAPENTIN 800 MG: 400 CAPSULE ORAL at 22:15

## 2024-01-09 RX ADMIN — SACUBITRIL AND VALSARTAN 1 TABLET: 24; 26 TABLET, FILM COATED ORAL at 20:37

## 2024-01-09 RX ADMIN — BACLOFEN 30 MG: 10 TABLET ORAL at 20:37

## 2024-01-09 RX ADMIN — TIZANIDINE 2 MG: 4 TABLET ORAL at 20:35

## 2024-01-09 RX ADMIN — DULOXETINE HYDROCHLORIDE 60 MG: 60 CAPSULE, DELAYED RELEASE ORAL at 20:38

## 2024-01-09 RX ADMIN — IPRATROPIUM BROMIDE AND ALBUTEROL SULFATE 3 ML: 2.5; .5 SOLUTION RESPIRATORY (INHALATION) at 19:47

## 2024-01-09 RX ADMIN — POTASSIUM CHLORIDE 10 MEQ: 20 TABLET, EXTENDED RELEASE ORAL at 20:38

## 2024-01-09 RX ADMIN — SODIUM CHLORIDE 2000 MG: 9 INJECTION, SOLUTION INTRAVENOUS at 10:37

## 2024-01-09 RX ADMIN — ATORVASTATIN CALCIUM 10 MG: 10 TABLET, FILM COATED ORAL at 20:38

## 2024-01-09 RX ADMIN — VANCOMYCIN HYDROCHLORIDE 2500 MG: 5 INJECTION, POWDER, LYOPHILIZED, FOR SOLUTION INTRAVENOUS at 01:13

## 2024-01-09 RX ADMIN — CEFEPIME 2000 MG: 2 INJECTION, POWDER, FOR SOLUTION INTRAVENOUS at 06:05

## 2024-01-09 RX ADMIN — PANTOPRAZOLE SODIUM 40 MG: 40 TABLET, DELAYED RELEASE ORAL at 20:36

## 2024-01-09 RX ADMIN — BUMETANIDE 2 MG: 1 TABLET ORAL at 20:37

## 2024-01-09 RX ADMIN — ARIPIPRAZOLE 10 MG: 10 TABLET ORAL at 20:38

## 2024-01-09 RX ADMIN — SPIRONOLACTONE 25 MG: 25 TABLET, FILM COATED ORAL at 20:35

## 2024-01-09 NOTE — NURSING NOTE
Order received for specialty bed.    Rental order placed with [facility] for Envella sand bed with trapeze.  Order #[id number].  Bed to be delivered.

## 2024-01-09 NOTE — CASE MANAGEMENT/SOCIAL WORK
Discharge Planning Assessment   Fabricio     Patient Name: Ken Krueger  MRN: [id number]  Today's Date: 1/9/2024    Admit Date: 1/8/2024    Plan: Pt declines [facility] . Pt states his family will assist with I.V.antibiotics.          Discharge Plan       Row Name 01/09/24 1547       Plan    Plan Pt declines [facility] . Pt states his family will assist with I.V.antibiotics.                Laurence Gonzalez RN
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
## 2024-01-09 NOTE — NURSING NOTE
In room with GUANACO Chandra to assess wounds.    Stage 4 PI to left gluteal, stage 4 PI to right gluteal, and stage 4 PI to sacrum.  See attached flow sheet documentation for further details.  Malodorous.  Will cleanse with Dakin's 1/4 strength solution, pack with damp Kerlix, and cover with dry dressings BID.    Unstageable PI to right lateral knee.  Wound bed obscured with yellow, moist slough.  Will treat with TheraHoney and cover with silicone border dressing BID.    Dry scab covered wound to right heel and right lower leg.  Mandy wound intact and pink.  No drainage.  Will paint with betadine daily.    Patient with colostomy to LLQ and urostomy to RLQ.  Ostomy protocol orders initiated.    Alejandro score 12.  PI prevention orders initiated.    Order for [facility] specialty bed noted.  Awaiting delivery at this time.       01/09/24 1045   Wound 05/25/23 1430 Right lower gluteal Pressure Injury   Placement Date/Time: 05/25/23 1430   Present on Original Admission: Yes  Side: Right  Orientation: lower  Location: gluteal  Primary Wound Type: Pressure Injury   Pressure Injury Stage 4   Dressing Appearance moist drainage   Closure None   Base moist;red   Periwound redness   Periwound Temperature warm   Periwound Skin Turgor soft   Edges open   Wound Length (cm) 2.5 cm   Wound Width (cm) 2.5 cm   Wound Depth (cm) 3 cm   Wound Surface Area (cm^2) 6.25 cm^2   Wound Volume (cm^3) 18.75 cm^3   Tunneling [Depth (cm)/Location] 5cm @ 9 o'clock   Drainage Characteristics/Odor yellow;malodorous   Drainage Amount moderate   Care, Wound cleansed with;wound cleanser   Dressing Care packed with;gauze;dressing applied   Periwound Care dry periwound area maintained   Wound 06/08/23 1356 Left lower gluteal Pressure Injury   Placement Date/Time: 06/08/23 1356   Present on Original Admission: Yes  Side: Left  Orientation: lower  Location: gluteal  Primary Wound Type: Pressure Injury   Pressure Injury Stage 4   Dressing Appearance moist  drainage   Closure None   Base moist;red;yellow;slough   Periwound redness   Periwound Temperature warm   Periwound Skin Turgor firm   Edges irregular   Wound Length (cm) 15 cm   Wound Width (cm) 6 cm   Wound Depth (cm) 6.5 cm   Wound Surface Area (cm^2) 90 cm^2   Wound Volume (cm^3) 585 cm^3   Drainage Characteristics/Odor yellow;malodorous   Drainage Amount moderate   Care, Wound cleansed with;wound cleanser   Dressing Care packed with;gauze;dressing applied   Periwound Care dry periwound area maintained   Wound 11/29/23 1407 Right lateral knee Pressure Injury   Placement Date/Time: 11/29/23 1407   Present on Original Admission: Yes  Side: Right  Orientation: lateral  Location: knee  Primary Wound Type: Pressure Injury   Pressure Injury Stage U   Dressing Appearance moist drainage   Closure None   Base moist;yellow;slough   Periwound redness   Periwound Temperature warm   Periwound Skin Turgor firm   Edges irregular   Wound Length (cm) 2.5 cm   Wound Width (cm) 3 cm   Wound Surface Area (cm^2) 7.5 cm^2   Drainage Characteristics/Odor malodorous;yellow   Drainage Amount small   Care, Wound cleansed with;wound cleanser   Dressing Care dressing applied   Periwound Care dry periwound area maintained   Wound 01/09/24 1221 medial sacral spine Pressure Injury   Placement Date/Time: 01/09/24 1221   Present on Original Admission: Yes  Orientation: medial  Location: sacral spine  Primary Wound Type: Pressure Injury   Wound Image   (see photo under media tab)   Pressure Injury Stage 4   Dressing Appearance moist drainage   Closure None   Base moist;red   Periwound intact;redness   Periwound Temperature warm   Periwound Skin Turgor soft   Edges open   Wound Length (cm) 5 cm   Wound Width (cm) 6.5 cm   Wound Depth (cm) 4.4 cm   Wound Surface Area (cm^2) 32.5 cm^2   Wound Volume (cm^3) 143 cm^3   Drainage Characteristics/Odor yellow;malodorous   Drainage Amount moderate   Care, Wound cleansed with;wound cleanser   Dressing Care  packed with;gauze;dressing applied   Periwound Care dry periwound area maintained   Wound 01/09/24 1222 Right heel Other (comment)   Placement Date/Time: 01/09/24 1222   Present on Original Admission: Yes  Side: Right  Location: heel  Primary Wound Type: (c) Other (comment)   Pressure Injury Stage O  (not a PI)   Dressing Appearance open to air   Closure None   Base dry;scab   Drainage Amount none   Dressing Care open to air   Wound 01/09/24 1223 Right lower leg Other (comment)   Placement Date/Time: 01/09/24 1223   Present on Original Admission: Yes  Side: Right  Orientation: lower  Location: leg  Primary Wound Type: (c) Other (comment)   Pressure Injury Stage O  (Not a PI)   Dressing Appearance open to air   Closure None   Base dry;scab   Drainage Amount none   Dressing Care open to air
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
## 2024-01-09 NOTE — CASE MANAGEMENT/SOCIAL WORK
Discharge Planning Assessment  [facility]     Patient Name: Ken Krueger  MRN: [id number]  Today's Date: 1/9/2024    Admit Date: 1/8/2024    Plan: CM spoke with Pt at bedside. Pt lives at home with Mother, Adult Brother and an uncle in [facility] and plans to return at d/c. Pt does not have HH. Pt has a hospital bed, trapeze bar, cpap and a lift all from [facility]. PCP is Franchesca BLANC and gets prescriptions filled at [facility] and [facility] in [place]. Pt has [employer] Replacement denies any trouble with coverage. Family will transport at d/c. CM will follow.   Discharge Needs Assessment       Row Name 01/09/24 1438       Living Environment    People in Home parent(s);child(catina), adult    Name(s) of People in Home Lives with mom, brother and uncle    Current Living Arrangements home    Potentially Unsafe Housing Conditions none    In the past 12 months has the electric, gas, oil, or water company threatened to shut off services in your home? No    Primary Care Provided by parent(s)  brother    Provides Primary Care For no one    Family Caregiver if Needed parent(s);sibling(s)    Family Caregiver Names Brothmendez Sung    Able to Return to Prior Arrangements yes       Resource/Environmental Concerns    Resource/Environmental Concerns none    Transportation Concerns none       Transportation Needs    In the past 12 months, has lack of transportation kept you from medical appointments or from getting medications? no    In the past 12 months, has lack of transportation kept you from meetings, work, or from getting things needed for daily living? No       Food Insecurity    Within the past 12 months, you worried that your food would run out before you got the money to buy more. Never true    Within the past 12 months, the food you bought just didn't last and you didn't have money to get more. Never true       Transition Planning    Patient/Family Anticipates Transition to home with family    Patient/Family Anticipated  Services at Transition none    Transportation Anticipated family or friend will provide       Discharge Needs Assessment    Readmission Within the Last 30 Days no previous admission in last 30 days    Equipment Currently Used at Home wheelchair, motorized;hospital bed;cpap;grab bar;glucometer;colostomy/ostomy supplies    Concerns to be Addressed no discharge needs identified    Equipment Needed After Discharge none    Outpatient/Agency/Support Group Needs infusion therapy, home  will need    Provided Post Acute Provider List? N/A    Provided Post Acute Provider Quality & Resource List? N/A                   Discharge Plan       Row Name 01/09/24 8630       Plan    Plan CM spoke with Pt at bedside. Pt lives at home with Mother, Adult Brother and an uncle in [facility] and plans to return at d/c. Pt does not have HH. Pt has a hospital bed, trapeze bar, cpap and a lift all from [facility]. PCP is Franchesca BLANC and gets prescriptions filled at [facility] and [facility] in [place]. Pt has [employer] Medicare Replacement denies any trouble with coverage. Family will transport at d/c. CM will follow.                 Laurence Gonzalez RN

## 2024-01-09 NOTE — PROGRESS NOTES
Interim Hospitalist Note    Patient seen and examined at bedside with no nursing staff present.  Patient states he is doing well and has no current complaints.  Did discuss patient's plan of care with wound care team.  Plan for bedside debridement tomorrow and if necessary we will consult general surgery services for consideration of further debridement in the operating room.  Will continue empiric antibiotic therapy with Rocephin and vancomycin for now.

## 2024-01-09 NOTE — PLAN OF CARE
Goal Outcome Evaluation:              Outcome Evaluation: Patient arrived to the floor from ER this shift, no comlaints or request at this time, VSS, Will continue plan of care.

## 2024-01-09 NOTE — PLAN OF CARE
Goal Outcome Evaluation:            Pt resting in bed at this time. Pt wound care performed by Marge CHRISTIE (wound nurse). Bp low this shift Dr. Alcantaras aware and fluids started. No other complaints at this time.

## 2024-01-10 LAB
ANION GAP SERPL CALCULATED.3IONS-SCNC: 9.1 MMOL/L (ref 5–15)
BUN SERPL-MCNC: 22 MG/DL (ref 6–20)
BUN/CREAT SERPL: 23.7 (ref 7–25)
CALCIUM SPEC-SCNC: 8.4 MG/DL (ref 8.6–10.5)
CHLORIDE SERPL-SCNC: 102 MMOL/L (ref 98–107)
CO2 SERPL-SCNC: 22.9 MMOL/L (ref 22–29)
CREAT SERPL-MCNC: 0.93 MG/DL (ref 0.76–1.27)
DEPRECATED RDW RBC AUTO: 51.4 FL (ref 37–54)
EGFRCR SERPLBLD CKD-EPI 2021: 102.6 ML/MIN/1.73
ERYTHROCYTE [DISTWIDTH] IN BLOOD BY AUTOMATED COUNT: 16.7 % (ref 12.3–15.4)
GLUCOSE BLDC GLUCOMTR-MCNC: 110 MG/DL (ref 70–130)
GLUCOSE BLDC GLUCOMTR-MCNC: 118 MG/DL (ref 70–130)
GLUCOSE BLDC GLUCOMTR-MCNC: 123 MG/DL (ref 70–130)
GLUCOSE BLDC GLUCOMTR-MCNC: 124 MG/DL (ref 70–130)
GLUCOSE SERPL-MCNC: 96 MG/DL (ref 65–99)
HCT VFR BLD AUTO: 27 % (ref 37.5–51)
HGB BLD-MCNC: 7.6 G/DL (ref 13–17.7)
MCH RBC QN AUTO: 23.6 PG (ref 26.6–33)
MCHC RBC AUTO-ENTMCNC: 28.1 G/DL (ref 31.5–35.7)
MCV RBC AUTO: 83.9 FL (ref 79–97)
PLATELET # BLD AUTO: 374 10*3/MM3 (ref 140–450)
PMV BLD AUTO: 9.2 FL (ref 6–12)
POTASSIUM SERPL-SCNC: 4.2 MMOL/L (ref 3.5–5.2)
RBC # BLD AUTO: 3.22 10*6/MM3 (ref 4.14–5.8)
SODIUM SERPL-SCNC: 134 MMOL/L (ref 136–145)
WBC NRBC COR # BLD AUTO: 8.05 10*3/MM3 (ref 3.4–10.8)

## 2024-01-10 PROCEDURE — 25010000002 CEFTRIAXONE PER 250 MG: Performed by: NURSE PRACTITIONER

## 2024-01-10 PROCEDURE — 94761 N-INVAS EAR/PLS OXIMETRY MLT: CPT

## 2024-01-10 PROCEDURE — 99232 SBSQ HOSP IP/OBS MODERATE 35: CPT | Performed by: INTERNAL MEDICINE

## 2024-01-10 PROCEDURE — 87186 SC STD MICRODIL/AGAR DIL: CPT | Performed by: NURSE PRACTITIONER

## 2024-01-10 PROCEDURE — 80048 BASIC METABOLIC PNL TOTAL CA: CPT | Performed by: INTERNAL MEDICINE

## 2024-01-10 PROCEDURE — 82948 REAGENT STRIP/BLOOD GLUCOSE: CPT

## 2024-01-10 PROCEDURE — 87070 CULTURE OTHR SPECIMN AEROBIC: CPT | Performed by: NURSE PRACTITIONER

## 2024-01-10 PROCEDURE — 94799 UNLISTED PULMONARY SVC/PX: CPT

## 2024-01-10 PROCEDURE — 87176 TISSUE HOMOGENIZATION CULTR: CPT | Performed by: NURSE PRACTITIONER

## 2024-01-10 PROCEDURE — 0KBP3ZZ EXCISION OF LEFT HIP MUSCLE, PERCUTANEOUS APPROACH: ICD-10-PCS | Performed by: NURSE PRACTITIONER

## 2024-01-10 PROCEDURE — 25810000003 SODIUM CHLORIDE 0.9 % SOLUTION: Performed by: INTERNAL MEDICINE

## 2024-01-10 PROCEDURE — 87205 SMEAR GRAM STAIN: CPT | Performed by: NURSE PRACTITIONER

## 2024-01-10 PROCEDURE — 85027 COMPLETE CBC AUTOMATED: CPT | Performed by: INTERNAL MEDICINE

## 2024-01-10 PROCEDURE — 87077 CULTURE AEROBIC IDENTIFY: CPT | Performed by: NURSE PRACTITIONER

## 2024-01-10 RX ORDER — METRONIDAZOLE 250 MG/1
500 TABLET ORAL EVERY 8 HOURS SCHEDULED
Status: DISCONTINUED | OUTPATIENT
Start: 2024-01-10 | End: 2024-01-15

## 2024-01-10 RX ORDER — LIDOCAINE HYDROCHLORIDE 20 MG/ML
JELLY TOPICAL ONCE
Status: COMPLETED | OUTPATIENT
Start: 2024-01-10 | End: 2024-01-10

## 2024-01-10 RX ADMIN — PANTOPRAZOLE SODIUM 40 MG: 40 TABLET, DELAYED RELEASE ORAL at 05:22

## 2024-01-10 RX ADMIN — METRONIDAZOLE 500 MG: 250 TABLET ORAL at 14:47

## 2024-01-10 RX ADMIN — SODIUM CHLORIDE 75 ML/HR: 9 INJECTION, SOLUTION INTRAVENOUS at 02:22

## 2024-01-10 RX ADMIN — SACUBITRIL AND VALSARTAN 1 TABLET: 24; 26 TABLET, FILM COATED ORAL at 21:34

## 2024-01-10 RX ADMIN — TIZANIDINE 2 MG: 4 TABLET ORAL at 14:59

## 2024-01-10 RX ADMIN — LIDOCAINE HYDROCHLORIDE: 20 JELLY TOPICAL at 14:48

## 2024-01-10 RX ADMIN — DANTROLENE SODIUM 100 MG: 25 CAPSULE ORAL at 16:29

## 2024-01-10 RX ADMIN — BACLOFEN 30 MG: 10 TABLET ORAL at 17:51

## 2024-01-10 RX ADMIN — DAKIN'S SOLUTION 0.125% (QUARTER STRENGTH): 0.12 SOLUTION at 21:38

## 2024-01-10 RX ADMIN — DULOXETINE HYDROCHLORIDE 60 MG: 60 CAPSULE, DELAYED RELEASE ORAL at 09:04

## 2024-01-10 RX ADMIN — BACLOFEN 30 MG: 10 TABLET ORAL at 21:33

## 2024-01-10 RX ADMIN — METRONIDAZOLE 500 MG: 250 TABLET ORAL at 21:34

## 2024-01-10 RX ADMIN — SODIUM CHLORIDE 75 ML/HR: 9 INJECTION, SOLUTION INTRAVENOUS at 15:26

## 2024-01-10 RX ADMIN — BACLOFEN 30 MG: 10 TABLET ORAL at 09:05

## 2024-01-10 RX ADMIN — DOCUSATE SODIUM 50 MG AND SENNOSIDES 8.6 MG 2 TABLET: 8.6; 5 TABLET, FILM COATED ORAL at 21:34

## 2024-01-10 RX ADMIN — POTASSIUM CHLORIDE 10 MEQ: 20 TABLET, EXTENDED RELEASE ORAL at 09:02

## 2024-01-10 RX ADMIN — GABAPENTIN 800 MG: 400 CAPSULE ORAL at 05:22

## 2024-01-10 RX ADMIN — BACLOFEN 30 MG: 10 TABLET ORAL at 11:44

## 2024-01-10 RX ADMIN — DANTROLENE SODIUM 100 MG: 25 CAPSULE ORAL at 21:34

## 2024-01-10 RX ADMIN — DANTROLENE SODIUM 100 MG: 25 CAPSULE ORAL at 09:03

## 2024-01-10 RX ADMIN — MODAFINIL 200 MG: 100 TABLET ORAL at 09:02

## 2024-01-10 RX ADMIN — DOCUSATE SODIUM 50 MG AND SENNOSIDES 8.6 MG 2 TABLET: 8.6; 5 TABLET, FILM COATED ORAL at 09:04

## 2024-01-10 RX ADMIN — GABAPENTIN 800 MG: 400 CAPSULE ORAL at 14:47

## 2024-01-10 RX ADMIN — Medication 10 ML: at 21:35

## 2024-01-10 RX ADMIN — Medication 1 TABLET: at 09:03

## 2024-01-10 RX ADMIN — TIZANIDINE 2 MG: 4 TABLET ORAL at 09:02

## 2024-01-10 RX ADMIN — GABAPENTIN 800 MG: 400 CAPSULE ORAL at 21:33

## 2024-01-10 RX ADMIN — DULOXETINE HYDROCHLORIDE 60 MG: 60 CAPSULE, DELAYED RELEASE ORAL at 21:34

## 2024-01-10 RX ADMIN — FERROUS SULFATE TAB 325 MG (65 MG ELEMENTAL FE) 325 MG: 325 (65 FE) TAB at 21:34

## 2024-01-10 RX ADMIN — TIZANIDINE 2 MG: 4 TABLET ORAL at 21:33

## 2024-01-10 RX ADMIN — IPRATROPIUM BROMIDE AND ALBUTEROL SULFATE 3 ML: 2.5; .5 SOLUTION RESPIRATORY (INHALATION) at 18:44

## 2024-01-10 RX ADMIN — ATORVASTATIN CALCIUM 10 MG: 10 TABLET, FILM COATED ORAL at 21:33

## 2024-01-10 RX ADMIN — DAKIN'S SOLUTION 0.125% (QUARTER STRENGTH): 0.12 SOLUTION at 09:10

## 2024-01-10 RX ADMIN — SODIUM CHLORIDE 2000 MG: 9 INJECTION, SOLUTION INTRAVENOUS at 11:44

## 2024-01-10 RX ADMIN — Medication 10 ML: at 09:09

## 2024-01-10 RX ADMIN — FERROUS SULFATE TAB 325 MG (65 MG ELEMENTAL FE) 325 MG: 325 (65 FE) TAB at 09:03

## 2024-01-10 RX ADMIN — ARIPIPRAZOLE 10 MG: 10 TABLET ORAL at 21:34

## 2024-01-10 NOTE — PROGRESS NOTES
PROGRESS NOTE         Patient Identification:  Name:  Ken Krueger  Age:  48 y.o.  Sex:  male  :  1975  MRN:  [id number]  Visit Number:  [id number]  Primary Care Provider:  Franchesca Hodges APRN         LOS: 1 day       ----------------------------------------------------------------------------------------------------------------------  Subjective       Chief Complaints:    Wound Check (Pt states his primary sent him her for his bed sore on his buttock. )        Interval History:      Overall patient seems to be comfortable with no acute distress.  Currently on BiPAP that he uses throughout the night.    Review of Systems:    Constitutional: no fever, Positive chills. No night sweats. No appetite change or unexpected weight change. No fatigue.  Eyes: no eye drainage, itching or redness.  HEENT: no mouth sores, dysphagia or nose bleed.  Respiratory: no for shortness of breath, cough or production of sputum.  Cardiovascular: no chest pain, no palpitations, no orthopnea.  Gastrointestinal: no nausea, vomiting or diarrhea. No abdominal pain, hematemesis or rectal bleeding.  Genitourinary: no dysuria or polyuria.  Hematologic/lymphatic: no lymph node abnormalities, no easy bruising or easy bleeding.  Musculoskeletal: no muscle or joint pain.  Skin: Multiple decubitus ulcers.  Neurological: no loss of consciousness, no seizure, no headache.  Behavioral/Psych: no depression or suicidal ideation.  Endocrine: no hot flashes.  Immunologic: negative.    ----------------------------------------------------------------------------------------------------------------------      Objective       Current [facility] Meds:  ARIPiprazole, 10 mg, Oral, Nightly  atorvastatin, 10 mg, Oral, Nightly  baclofen, 30 mg, Oral, 4x Daily With Meals & Nightly  bumetanide, 2 mg, Oral, Daily  carvedilol, 12.5 mg, Oral, BID With Meals  cefTRIAXone, 2,000 mg, Intravenous, Q24H  dantrolene, 100 mg, Oral, TID  DULoxetine, 60 mg, Oral,  BID  ferrous sulfate, 325 mg, Oral, BID  gabapentin, 800 mg, Oral, Q8H  insulin lispro, 2-7 Units, Subcutaneous, 4x Daily AC & at Bedtime  methenamine, 1 g, Oral, BID With Meals  metOLazone, 5 mg, Oral, Once per day on Monday Wednesday Friday  modafinil, 200 mg, Oral, Daily  multivitamin with minerals, 1 tablet, Oral, Daily  pantoprazole, 40 mg, Oral, Q AM  potassium chloride, 10 mEq, Oral, Daily  sacubitril-valsartan, 1 tablet, Oral, BID  senna-docusate sodium, 2 tablet, Oral, BID  sodium chloride, 10 mL, Intravenous, Q12H  sodium hypochlorite, , Topical, BID  spironolactone, 25 mg, Oral, Daily  tiZANidine, 2 mg, Oral, TID  Vericiguat, 10 mg, Oral, Daily      sodium chloride, 75 mL/hr, Last Rate: 75 mL/hr (01/10/24 0222)      ----------------------------------------------------------------------------------------------------------------------    Vital Signs:  Temp:  [97.9 °F (36.6 °C)-98.5 °F (36.9 °C)] 98 °F (36.7 °C)  Heart Rate:  [80-91] 88  Resp:  [16-20] 20  BP: ()/(54-68) 93/59  Mean Arterial Pressure (Non-Invasive) for the past 24 hrs (Last 3 readings):   Noninvasive MAP (mmHg)   01/09/24 1400 84   01/09/24 1000 62     SpO2 Percentage    01/09/24 1947 01/09/24 1957 01/10/24 0649   SpO2: 94% 94% 98%     SpO2:  [92 %-98 %] 98 %  on   ;   Device (Oxygen Therapy): room air    Body mass index is 41.37 kg/m².  Wt Readings from Last 3 Encounters:   01/09/24 135 kg (296 lb 9.6 oz)   12/27/23 (!) 148 kg (326 lb)   12/14/23 (!) 148 kg (326 lb)        Intake/Output Summary (Last 24 hours) at 1/10/2024 0945  Last data filed at 1/10/2024 0900  Gross per 24 hour   Intake 1302 ml   Output 1050 ml   Net 252 ml     Diet: Diabetic Diets; Consistent Carbohydrate; Texture: Regular Texture (IDDSI 7); Fluid Consistency: Thin (IDDSI 0)  ----------------------------------------------------------------------------------------------------------------------      Physical Exam:    Constitutional:  Well-developed and  well-nourished.  No respiratory distress.      HENT:  Head: Normocephalic and atraumatic.  Mouth:  Moist mucous membranes.    Eyes:  Conjunctivae and EOM are normal.  No scleral icterus.  Neck:  Neck supple.  No JVD present.    Cardiovascular:  Normal rate, regular rhythm and normal heart sounds with no murmur. No edema.  Pulmonary/Chest:  No respiratory distress, no wheezes, no crackles, with normal breath sounds and good air movement.  Abdominal:  Soft.  Bowel sounds are normal.  No distension and no tenderness.  Ostomy.  Musculoskeletal: Left AKA.  Functional paraplegia.  Neurological:  Alert and oriented to person, place, and time.  No facial droop.  No slurred speech.   Skin:  Skin is warm and dry.  No rash noted.  No pallor.  Sacral decubitus ulcer, left gluteal pressure ulcer with no evidence of active infection. Right lower gluteal ulcer with necrotic tissue present.  Odor was noted from wounds with purulent drainage.  Psychiatric:  Normal mood and affect.  Behavior is normal.        ----------------------------------------------------------------------------------------------------------------------            Results from last 7 days   Lab Units 01/10/24  0248 01/09/24  0541 01/09/24  0312 01/08/24  2322   CRP mg/dL  --   --   --  15.63*   LACTATE mmol/L  --  2.0 2.1* 2.3*   WBC 10*3/mm3 8.05 11.62*  --  13.21*   HEMOGLOBIN g/dL 7.6* 8.3*  --  9.0*   HEMATOCRIT % 27.0* 30.0*  --  32.5*   MCV fL 83.9 83.6  --  83.8   MCHC g/dL 28.1* 27.7*  --  27.7*   PLATELETS 10*3/mm3 374 430  --  468*     Results from last 7 days   Lab Units 01/10/24  0248 01/09/24  0541 01/08/24  2322   SODIUM mmol/L 134* 134* 134*   POTASSIUM mmol/L 4.2 4.1 4.2   CHLORIDE mmol/L 102 100 99   CO2 mmol/L 22.9 23.7 24.7   BUN mg/dL 22* 22* 22*   CREATININE mg/dL 0.93 0.98 0.97   CALCIUM mg/dL 8.4* 8.6 9.2   GLUCOSE mg/dL 96 126* 113*   ALBUMIN g/dL  --  2.8* 3.4*   BILIRUBIN mg/dL  --  0.3 0.3   ALK PHOS U/L  --  124* 144*   AST (SGOT)  "U/L  --  11 10   ALT (SGPT) U/L  --  12 12   Estimated Creatinine Clearance: 139.3 mL/min (by C-G formula based on SCr of 0.93 mg/dL).  No results found for: \"AMMONIA\"    Glucose   Date/Time Value Ref Range Status   01/10/2024 0741 110 70 - 130 mg/dL Final   01/09/2024 2043 170 (H) 70 - 130 mg/dL Final   01/09/2024 1606 107 70 - 130 mg/dL Final   01/09/2024 1027 126 70 - 130 mg/dL Final   01/09/2024 0651 126 70 - 130 mg/dL Final     Lab Results   Component Value Date    HGBA1C 11.50 (H) 02/13/2023     Lab Results   Component Value Date    TSH 3.780 07/19/2022    FREET4 1.31 06/02/2021       Blood Culture   Date Value Ref Range Status   01/08/2024 No growth at 24 hours  Preliminary   01/08/2024 No growth at 24 hours  Preliminary     No results found for: \"URINECX\"  No results found for: \"WOUNDCX\"  No results found for: \"STOOLCX\"  No results found for: \"RESPCX\"  Pain Management Panel  More data exists         Latest Ref Rng & Units 4/9/2023 6/2/2021   Pain Management Panel   Creatinine, Urine mg/dL 79.5  -   Amphetamine, Urine Qual Negative - Negative    Barbiturates Screen, Urine Negative - Negative    Benzodiazepine Screen, Urine Negative - Negative    Buprenorphine, Screen, Urine Negative - Negative    Cocaine Screen, Urine Negative - Negative    Methadone Screen , Urine Negative - Negative          ----------------------------------------------------------------------------------------------------------------------  Imaging Results (Last 24 Hours)       ** No results found for the last 24 hours. **            ----------------------------------------------------------------------------------------------------------------------    Pertinent Infectious Disease Results                Assessment/Plan       Assessment     Severe sepsis with lactic acid greater than 2 on admission  Acute on chronic osteomyelitis        Plan      Leukocytosis has resolved from 11.62 yesterday down to 8.05 this morning with normal " creatinine at 0.93 and blood cultures showing no growth at 24 hours and previous cultures collected on 12/28/2023 showing growth of E. coli and Proteus mirabilis.    Based on most recent wound culture results from 12/28/2023, I de-escalated antibiotic regimen to ceftriaxone 2 g IV every 24 hours and based on CT scan showing evidence of osteomyelitis patient will require 4 to 6 weeks of IV antibiotic therapy.    Recommend repeating deep wound culture with next dressing change or intraoperative cultures if debridement is planned.    I have seen the patient before multiple times for his decubitus ulcers but this time seems to be the most severe due to increased areas of necrosis and significant drainage with foul odor associated.  Will go ahead and add metronidazole coverage for anaerobic organisms and would highly recommend to consider wound VAC placement and LTAC referral.    ANTIMICROBIAL THERAPY    amoxicillin-clavulanate - 500-125 MG  cefTRIAXone (ROCEPHIN) 2000 mg IVPB in 100 mL NS (VTB)  methenamine - 1 g, 1 g     Code Status:   Code Status and Medical Interventions:   Ordered at: 01/09/24 0247     Code Status (Patient has no pulse and is not breathing):    CPR (Attempt to Resuscitate)     Medical Interventions (Patient has pulse or is breathing):    Full Support       Tra Jain MD  01/10/24  09:45 EST

## 2024-01-10 NOTE — PLAN OF CARE
Goal Outcome Evaluation:  Plan of Care Reviewed With: patient        Progress: no change  Outcome Evaluation: Patient resting in bed at this time. NS infusing at 75Ml/HR. VSS. No acute distress noted at this time. Will continue with POC.

## 2024-01-10 NOTE — PLAN OF CARE
Goal Outcome Evaluation:              Outcome Evaluation: Pt resting in bed at this time. Home CPAP in use. Wound care completed per orders. Pt is bedrest. No complaints at this time. Will continue POC.
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
## 2024-01-10 NOTE — PROGRESS NOTES
[facility] HOSPITALIST PROGRESS NOTE     Patient Identification:  Name:  Ken Krueger  Age:  48 y.o.  Sex:  male  :  1975  MRN:  [id number]  Visit Number:  [id number]  Primary Care Provider:  Franchesca Hodges APRN    Length of stay:  1    Chief complaint: Sacral pain    Subjective:    Patient seen and examined at bedside with nursing staff present.  Patient continues to report mild sacral pain but otherwise denies any new symptoms overnight.  Patient doing well and did have bedside debridement completed today.  Unfortunately, patient does not appear to be an ideal candidate for wound VAC secondary to the nature of his wound.  Patient may benefit from long-term wound care inside and LTAC facility.  ----------------------------------------------------------------------------------------------------------------------  Current Hospital Meds:  ARIPiprazole, 10 mg, Oral, Nightly  atorvastatin, 10 mg, Oral, Nightly  baclofen, 30 mg, Oral, 4x Daily With Meals & Nightly  bumetanide, 2 mg, Oral, Daily  carvedilol, 12.5 mg, Oral, BID With Meals  cefTRIAXone, 2,000 mg, Intravenous, Q24H  dantrolene, 100 mg, Oral, TID  DULoxetine, 60 mg, Oral, BID  ferrous sulfate, 325 mg, Oral, BID  gabapentin, 800 mg, Oral, Q8H  insulin lispro, 2-7 Units, Subcutaneous, 4x Daily AC & at Bedtime  methenamine, 1 g, Oral, BID With Meals  metOLazone, 5 mg, Oral, Once per day on   metroNIDAZOLE, 500 mg, Oral, Q8H  modafinil, 200 mg, Oral, Daily  multivitamin with minerals, 1 tablet, Oral, Daily  pantoprazole, 40 mg, Oral, Q AM  potassium chloride, 10 mEq, Oral, Daily  sacubitril-valsartan, 1 tablet, Oral, BID  senna-docusate sodium, 2 tablet, Oral, BID  sodium chloride, 10 mL, Intravenous, Q12H  sodium hypochlorite, , Topical, BID  spironolactone, 25 mg, Oral, Daily  tiZANidine, 2 mg, Oral, TID  Vericiguat, 10 mg, Oral, Daily      sodium chloride, 75 mL/hr, Last Rate: 75 mL/hr (01/10/24  1526)      ----------------------------------------------------------------------------------------------------------------------  Vital Signs:  Temp:  [98 °F (36.7 °C)-98.5 °F (36.9 °C)] 98 °F (36.7 °C)  Heart Rate:  [79-91] 91  Resp:  [18-20] 18  BP: ()/(54-66) 96/66      01/08/24 2026 01/09/24  3773   Weight: (!) 148 kg (326 lb) 135 kg (296 lb 9.6 oz)     Body mass index is 41.37 kg/m².    Intake/Output Summary (Last 24 hours) at 1/10/2024 1848  Last data filed at 1/10/2024 1830  Gross per 24 hour   Intake 3259 ml   Output 1500 ml   Net 1759 ml     Diet: Diabetic Diets; Consistent Carbohydrate; Texture: Regular Texture (IDDSI 7); Fluid Consistency: Thin (IDDSI 0)  ----------------------------------------------------------------------------------------------------------------------  Physical exam:  Constitutional: Morbidly obese by BMI  male in no apparent distress.     HENT:  Head:  Normocephalic and atraumatic.  Mouth:  Moist mucous membranes.    Eyes:  Conjunctivae and EOM are normal.  Pupils are equal, round, and reactive to light.  No scleral icterus.    Neck:  Neck supple. No thyromegaly.  No JVD present.    Cardiovascular:  Regular rate and rhythm with no murmurs, rubs, clicks or gallops appreciated.  Pulmonary/Chest:  Clear to auscultation bilaterally with no crackles, wheezes or rhonchi appreciated.  Abdominal:  Soft. Nontender. Nondistended  Bowel sounds are normal in all four quadrants. No organomegally appreciated.   Musculoskeletal:  No edema, no tenderness, and no deformity.  No red or swollen joints anywhere.    Neurological:  Alert, Cranial nerves II-XII intact with no focal deficits.  No facial droop.  No slurred speech.   Skin:  Warm and dry to palpation with no rashes or lesions appreciated.  Peripheral vascular:  2+ radial and pedal pulses in bilateral upper and lower extremities.  Psychiatric:  Alert and oriented x3, demonstrates appropriate judgment and  "insight.  ------------------------------------------------------------------------------------------------------------  ----------------------------------------------------------------------------------------------------------------------      Results from last 7 days   Lab Units 01/10/24  0248 01/09/24  0541 01/09/24  0312 01/08/24  2322   CRP mg/dL  --   --   --  15.63*   LACTATE mmol/L  --  2.0 2.1* 2.3*   WBC 10*3/mm3 8.05 11.62*  --  13.21*   HEMOGLOBIN g/dL 7.6* 8.3*  --  9.0*   HEMATOCRIT % 27.0* 30.0*  --  32.5*   MCV fL 83.9 83.6  --  83.8   MCHC g/dL 28.1* 27.7*  --  27.7*   PLATELETS 10*3/mm3 374 430  --  468*         Results from last 7 days   Lab Units 01/10/24  0248 01/09/24  0541 01/08/24  2322   SODIUM mmol/L 134* 134* 134*   POTASSIUM mmol/L 4.2 4.1 4.2   CHLORIDE mmol/L 102 100 99   CO2 mmol/L 22.9 23.7 24.7   BUN mg/dL 22* 22* 22*   CREATININE mg/dL 0.93 0.98 0.97   CALCIUM mg/dL 8.4* 8.6 9.2   GLUCOSE mg/dL 96 126* 113*   ALBUMIN g/dL  --  2.8* 3.4*   BILIRUBIN mg/dL  --  0.3 0.3   ALK PHOS U/L  --  124* 144*   AST (SGOT) U/L  --  11 10   ALT (SGPT) U/L  --  12 12   Estimated Creatinine Clearance: 139.3 mL/min (by C-G formula based on SCr of 0.93 mg/dL).    No results found for: \"AMMONIA\"      Blood Culture   Date Value Ref Range Status   01/08/2024 No growth at 24 hours  Preliminary   01/08/2024 No growth at 24 hours  Preliminary     No results found for: \"URINECX\"  No results found for: \"WOUNDCX\"  No results found for: \"STOOLCX\"    I have personally looked at the labs and they are summarized above.  ----------------------------------------------------------------------------------------------------------------------  Imaging Results (Last 24 Hours)       ** No results found for the last 24 hours. **          ----------------------------------------------------------------------------------------------------------------------  Assessment and Plan:    Sacral decubitus ulcer -patient did have " bedside debridement performed today.  Unfortunately, patient is not an appropriate candidate for wound VAC at this time.  Will continue wound care per wound care nurse practitioner recommendations.  Will consult LTAC for evaluation of placement.    2.  Acute on chronic osteomyelitis -continue  Rocephin and Flagyl per ID recommendations.    3.  Severe sepsis -present on admission, secondary to chronic sacral decubitus ulcer    4.  Chronic HFpEF/HFrEF -no evidence of exacerbation at this time    5.  Essential hypertension -well-controlled    6.  Type 2 diabetes mellitus -continue Accu-Cheks before every meal and nightly with sliding scale insulin    7.  Morbid obesity -complicates all aspects of care    Disposition will likely require several more days hospitalization    Harlan Fernández,    01/10/24   18:48 EST

## 2024-01-11 LAB
ANION GAP SERPL CALCULATED.3IONS-SCNC: 8.9 MMOL/L (ref 5–15)
BUN SERPL-MCNC: 28 MG/DL (ref 6–20)
BUN/CREAT SERPL: 30.4 (ref 7–25)
CALCIUM SPEC-SCNC: 8.6 MG/DL (ref 8.6–10.5)
CHLORIDE SERPL-SCNC: 103 MMOL/L (ref 98–107)
CO2 SERPL-SCNC: 22.1 MMOL/L (ref 22–29)
CREAT SERPL-MCNC: 0.92 MG/DL (ref 0.76–1.27)
DEPRECATED RDW RBC AUTO: 52.7 FL (ref 37–54)
EGFRCR SERPLBLD CKD-EPI 2021: 103.9 ML/MIN/1.73
ERYTHROCYTE [DISTWIDTH] IN BLOOD BY AUTOMATED COUNT: 16.9 % (ref 12.3–15.4)
GLUCOSE BLDC GLUCOMTR-MCNC: 125 MG/DL (ref 70–130)
GLUCOSE BLDC GLUCOMTR-MCNC: 128 MG/DL (ref 70–130)
GLUCOSE BLDC GLUCOMTR-MCNC: 134 MG/DL (ref 70–130)
GLUCOSE BLDC GLUCOMTR-MCNC: 136 MG/DL (ref 70–130)
GLUCOSE SERPL-MCNC: 124 MG/DL (ref 65–99)
HCT VFR BLD AUTO: 27.9 % (ref 37.5–51)
HGB BLD-MCNC: 7.9 G/DL (ref 13–17.7)
MCH RBC QN AUTO: 23.7 PG (ref 26.6–33)
MCHC RBC AUTO-ENTMCNC: 28.3 G/DL (ref 31.5–35.7)
MCV RBC AUTO: 83.5 FL (ref 79–97)
PLATELET # BLD AUTO: 397 10*3/MM3 (ref 140–450)
PMV BLD AUTO: 9.7 FL (ref 6–12)
POTASSIUM SERPL-SCNC: 4.6 MMOL/L (ref 3.5–5.2)
RBC # BLD AUTO: 3.34 10*6/MM3 (ref 4.14–5.8)
SODIUM SERPL-SCNC: 134 MMOL/L (ref 136–145)
WBC NRBC COR # BLD AUTO: 9.03 10*3/MM3 (ref 3.4–10.8)

## 2024-01-11 PROCEDURE — 94664 DEMO&/EVAL PT USE INHALER: CPT

## 2024-01-11 PROCEDURE — 25010000002 CEFTRIAXONE PER 250 MG: Performed by: NURSE PRACTITIONER

## 2024-01-11 PROCEDURE — 85027 COMPLETE CBC AUTOMATED: CPT | Performed by: INTERNAL MEDICINE

## 2024-01-11 PROCEDURE — 94799 UNLISTED PULMONARY SVC/PX: CPT

## 2024-01-11 PROCEDURE — 80048 BASIC METABOLIC PNL TOTAL CA: CPT | Performed by: INTERNAL MEDICINE

## 2024-01-11 PROCEDURE — 99232 SBSQ HOSP IP/OBS MODERATE 35: CPT | Performed by: INTERNAL MEDICINE

## 2024-01-11 PROCEDURE — 25810000003 SODIUM CHLORIDE 0.9 % SOLUTION: Performed by: INTERNAL MEDICINE

## 2024-01-11 PROCEDURE — 99232 SBSQ HOSP IP/OBS MODERATE 35: CPT | Performed by: NURSE PRACTITIONER

## 2024-01-11 PROCEDURE — 94761 N-INVAS EAR/PLS OXIMETRY MLT: CPT

## 2024-01-11 PROCEDURE — 82948 REAGENT STRIP/BLOOD GLUCOSE: CPT

## 2024-01-11 RX ADMIN — GABAPENTIN 800 MG: 400 CAPSULE ORAL at 05:30

## 2024-01-11 RX ADMIN — BACLOFEN 30 MG: 10 TABLET ORAL at 22:08

## 2024-01-11 RX ADMIN — Medication 1 TABLET: at 08:25

## 2024-01-11 RX ADMIN — CARVEDILOL 12.5 MG: 6.25 TABLET, FILM COATED ORAL at 17:26

## 2024-01-11 RX ADMIN — IPRATROPIUM BROMIDE AND ALBUTEROL SULFATE 3 ML: 2.5; .5 SOLUTION RESPIRATORY (INHALATION) at 06:48

## 2024-01-11 RX ADMIN — MODAFINIL 200 MG: 100 TABLET ORAL at 08:25

## 2024-01-11 RX ADMIN — GABAPENTIN 800 MG: 400 CAPSULE ORAL at 15:18

## 2024-01-11 RX ADMIN — ARIPIPRAZOLE 10 MG: 10 TABLET ORAL at 22:07

## 2024-01-11 RX ADMIN — DAKIN'S SOLUTION 0.125% (QUARTER STRENGTH): 0.12 SOLUTION at 22:09

## 2024-01-11 RX ADMIN — METHENAMINE HIPPURATE 1 G: 1000 TABLET ORAL at 08:26

## 2024-01-11 RX ADMIN — DAKIN'S SOLUTION 0.125% (QUARTER STRENGTH): 0.12 SOLUTION at 08:30

## 2024-01-11 RX ADMIN — FERROUS SULFATE TAB 325 MG (65 MG ELEMENTAL FE) 325 MG: 325 (65 FE) TAB at 22:08

## 2024-01-11 RX ADMIN — SODIUM CHLORIDE 2000 MG: 9 INJECTION, SOLUTION INTRAVENOUS at 13:06

## 2024-01-11 RX ADMIN — TIZANIDINE 2 MG: 4 TABLET ORAL at 17:25

## 2024-01-11 RX ADMIN — METRONIDAZOLE 500 MG: 250 TABLET ORAL at 15:18

## 2024-01-11 RX ADMIN — BACLOFEN 30 MG: 10 TABLET ORAL at 08:24

## 2024-01-11 RX ADMIN — FERROUS SULFATE TAB 325 MG (65 MG ELEMENTAL FE) 325 MG: 325 (65 FE) TAB at 09:54

## 2024-01-11 RX ADMIN — METRONIDAZOLE 500 MG: 250 TABLET ORAL at 05:30

## 2024-01-11 RX ADMIN — ATORVASTATIN CALCIUM 10 MG: 10 TABLET, FILM COATED ORAL at 22:08

## 2024-01-11 RX ADMIN — PANTOPRAZOLE SODIUM 40 MG: 40 TABLET, DELAYED RELEASE ORAL at 05:30

## 2024-01-11 RX ADMIN — SODIUM CHLORIDE 75 ML/HR: 9 INJECTION, SOLUTION INTRAVENOUS at 04:32

## 2024-01-11 RX ADMIN — DULOXETINE HYDROCHLORIDE 60 MG: 60 CAPSULE, DELAYED RELEASE ORAL at 08:23

## 2024-01-11 RX ADMIN — DANTROLENE SODIUM 100 MG: 25 CAPSULE ORAL at 22:07

## 2024-01-11 RX ADMIN — METHENAMINE HIPPURATE 1 G: 1000 TABLET ORAL at 17:24

## 2024-01-11 RX ADMIN — DOCUSATE SODIUM 50 MG AND SENNOSIDES 8.6 MG 2 TABLET: 8.6; 5 TABLET, FILM COATED ORAL at 22:07

## 2024-01-11 RX ADMIN — IPRATROPIUM BROMIDE AND ALBUTEROL SULFATE 3 ML: 2.5; .5 SOLUTION RESPIRATORY (INHALATION) at 18:46

## 2024-01-11 RX ADMIN — GABAPENTIN 800 MG: 400 CAPSULE ORAL at 22:21

## 2024-01-11 RX ADMIN — DANTROLENE SODIUM 100 MG: 25 CAPSULE ORAL at 08:23

## 2024-01-11 RX ADMIN — DULOXETINE HYDROCHLORIDE 60 MG: 60 CAPSULE, DELAYED RELEASE ORAL at 22:07

## 2024-01-11 RX ADMIN — DANTROLENE SODIUM 100 MG: 25 CAPSULE ORAL at 17:25

## 2024-01-11 RX ADMIN — Medication 10 ML: at 22:09

## 2024-01-11 RX ADMIN — POTASSIUM CHLORIDE 10 MEQ: 20 TABLET, EXTENDED RELEASE ORAL at 08:25

## 2024-01-11 RX ADMIN — BACLOFEN 30 MG: 10 TABLET ORAL at 17:37

## 2024-01-11 RX ADMIN — TIZANIDINE 2 MG: 4 TABLET ORAL at 08:24

## 2024-01-11 RX ADMIN — TIZANIDINE 2 MG: 4 TABLET ORAL at 23:00

## 2024-01-11 RX ADMIN — METRONIDAZOLE 500 MG: 250 TABLET ORAL at 22:07

## 2024-01-11 RX ADMIN — BACLOFEN 30 MG: 10 TABLET ORAL at 13:06

## 2024-01-11 RX ADMIN — DOCUSATE SODIUM 50 MG AND SENNOSIDES 8.6 MG 2 TABLET: 8.6; 5 TABLET, FILM COATED ORAL at 08:25

## 2024-01-11 NOTE — PROGRESS NOTES
[facility] HOSPITALIST PROGRESS NOTE     Patient Identification:  Name:  Ken Krueger  Age:  48 y.o.  Sex:  male  :  1975  MRN:  [id number]  Visit Number:  [id number]  Primary Care Provider:  Franchesca Hodges APRN    Length of stay:  2    Chief complaint: Sacral pain    Subjective:    Patient states he is doing well today and has no specific complaints.  He denies any fevers, chills, sweats, rigors, nausea or vomiting.  Did discuss with patient transitioning to LTAC for continued IV antibiotic therapy and wound care.  Patient expressed his understanding and willingness to proceed with this plan.  ----------------------------------------------------------------------------------------------------------------------  Current [facility] Meds:  ARIPiprazole, 10 mg, Oral, Nightly  atorvastatin, 10 mg, Oral, Nightly  baclofen, 30 mg, Oral, 4x Daily With Meals & Nightly  bumetanide, 2 mg, Oral, Daily  carvedilol, 12.5 mg, Oral, BID With Meals  cefTRIAXone, 2,000 mg, Intravenous, Q24H  dantrolene, 100 mg, Oral, TID  DULoxetine, 60 mg, Oral, BID  ferrous sulfate, 325 mg, Oral, BID  gabapentin, 800 mg, Oral, Q8H  insulin lispro, 2-7 Units, Subcutaneous, 4x Daily AC & at Bedtime  methenamine, 1 g, Oral, BID With Meals  metOLazone, 5 mg, Oral, Once per day on   metroNIDAZOLE, 500 mg, Oral, Q8H  modafinil, 200 mg, Oral, Daily  multivitamin with minerals, 1 tablet, Oral, Daily  pantoprazole, 40 mg, Oral, Q AM  potassium chloride, 10 mEq, Oral, Daily  sacubitril-valsartan, 1 tablet, Oral, BID  senna-docusate sodium, 2 tablet, Oral, BID  sodium chloride, 10 mL, Intravenous, Q12H  sodium hypochlorite, , Topical, BID  spironolactone, 25 mg, Oral, Daily  tiZANidine, 2 mg, Oral, TID  Vericiguat, 10 mg, Oral, Daily      sodium chloride, 75 mL/hr, Last Rate: 75 mL/hr (24  0432)      ----------------------------------------------------------------------------------------------------------------------  Vital Signs:  Temp:  [97.6 °F (36.4 °C)-98.1 °F (36.7 °C)] 98 °F (36.7 °C)  Heart Rate:  [83-91] 89  Resp:  [16-20] 18  BP: ()/(61-73) 100/64      01/08/24 2026 01/09/24  0443   Weight: (!) 148 kg (326 lb) 135 kg (296 lb 9.6 oz)     Body mass index is 41.37 kg/m².    Intake/Output Summary (Last 24 hours) at 1/11/2024 1508  Last data filed at 1/11/2024 1000  Gross per 24 hour   Intake 2979 ml   Output 1850 ml   Net 1129 ml     Diet: Diabetic Diets; Consistent Carbohydrate; Texture: Regular Texture (IDDSI 7); Fluid Consistency: Thin (IDDSI 0)  ----------------------------------------------------------------------------------------------------------------------  Physical exam:  Constitutional: Morbidly obese by BMI  male in no apparent distress.     HENT:  Head:  Normocephalic and atraumatic.  Mouth:  Moist mucous membranes.    Eyes:  Conjunctivae and EOM are normal.  Pupils are equal, round, and reactive to light.  No scleral icterus.    Neck:  Neck supple. No thyromegaly.  No JVD present.    Cardiovascular:  Regular rate and rhythm with no murmurs, rubs, clicks or gallops appreciated.  Pulmonary/Chest:  Clear to auscultation bilaterally with no crackles, wheezes or rhonchi appreciated.  Abdominal:  Soft. Nontender. Nondistended  Bowel sounds are normal in all four quadrants. No organomegally appreciated.   Musculoskeletal:  No edema, no tenderness, and no deformity.  No red or swollen joints anywhere.    Neurological:  Alert, Cranial nerves II-XII intact with no focal deficits.  No facial droop.  No slurred speech.   Skin:  Warm and dry to palpation with no rashes or lesions appreciated.  Peripheral vascular:  2+ radial and pedal pulses in bilateral upper and lower extremities.  Psychiatric:  Alert and oriented x3, demonstrates appropriate judgment and  "insight.  ------------------------------------------------------------------------------------------------------------  ----------------------------------------------------------------------------------------------------------------------      Results from last 7 days   Lab Units 01/11/24  0146 01/10/24  0248 01/09/24  0541 01/09/24  0312 01/08/24  2322   CRP mg/dL  --   --   --   --  15.63*   LACTATE mmol/L  --   --  2.0 2.1* 2.3*   WBC 10*3/mm3 9.03 8.05 11.62*  --  13.21*   HEMOGLOBIN g/dL 7.9* 7.6* 8.3*  --  9.0*   HEMATOCRIT % 27.9* 27.0* 30.0*  --  32.5*   MCV fL 83.5 83.9 83.6  --  83.8   MCHC g/dL 28.3* 28.1* 27.7*  --  27.7*   PLATELETS 10*3/mm3 397 374 430  --  468*         Results from last 7 days   Lab Units 01/11/24  0145 01/10/24  0248 01/09/24  0541 01/08/24  2322   SODIUM mmol/L 134* 134* 134* 134*   POTASSIUM mmol/L 4.6 4.2 4.1 4.2   CHLORIDE mmol/L 103 102 100 99   CO2 mmol/L 22.1 22.9 23.7 24.7   BUN mg/dL 28* 22* 22* 22*   CREATININE mg/dL 0.92 0.93 0.98 0.97   CALCIUM mg/dL 8.6 8.4* 8.6 9.2   GLUCOSE mg/dL 124* 96 126* 113*   ALBUMIN g/dL  --   --  2.8* 3.4*   BILIRUBIN mg/dL  --   --  0.3 0.3   ALK PHOS U/L  --   --  124* 144*   AST (SGOT) U/L  --   --  11 10   ALT (SGPT) U/L  --   --  12 12   Estimated Creatinine Clearance: 140.8 mL/min (by C-G formula based on SCr of 0.92 mg/dL).    No results found for: \"AMMONIA\"      Blood Culture   Date Value Ref Range Status   01/08/2024 No growth at 24 hours  Preliminary   01/08/2024 No growth at 24 hours  Preliminary     No results found for: \"URINECX\"  No results found for: \"WOUNDCX\"  No results found for: \"STOOLCX\"    I have personally looked at the labs and they are summarized above.  ----------------------------------------------------------------------------------------------------------------------  Imaging Results (Last 24 Hours)       ** No results found for the last 24 hours. **      "     ----------------------------------------------------------------------------------------------------------------------  Assessment and Plan:    Sacral decubitus ulcer - patient did have bedside debridement performed yesterday.  Unfortunately, patient is not an appropriate candidate for wound VAC at this time.  Will continue wound care per wound care nurse practitioner recommendations.  LTAC consult currently pending.      2.  Acute on chronic osteomyelitis - continue  Rocephin and Flagyl per ID recommendations.    3.  Severe sepsis -present on admission, secondary to chronic sacral decubitus ulcer    4.  Chronic HFpEF/HFrEF -no evidence of exacerbation at this time    5.  Essential hypertension -well-controlled    6.  Type 2 diabetes mellitus -continue Accu-Cheks before every meal and nightly with sliding scale insulin    7.  Morbid obesity -complicates all aspects of care    Disposition will likely require several more days hospitalization    Harlan Fernández,    01/11/24   15:08 EST

## 2024-01-11 NOTE — CASE MANAGEMENT/SOCIAL WORK
Discharge Planning Assessment   Fabricio     Patient Name: Ken Krueger  MRN: [id number]  Today's Date: 1/11/2024    Admit Date: 1/8/2024    Plan: CM spoke with Pt and he is agreeable today to go to [facility] . CM spoke with Hollie in [facility] and she is on her way to speak with Pt at this time. CM will follow.   Discharge Needs Assessment    No documentation.                  Discharge Plan       Row Name 01/11/24 1443       Plan    Plan CM spoke with Pt and he is agreeable today to go to [facility] . CM spoke with Hollie in [facility] and she is on her way to speak with Pt at this time. CM will follow.                    Laurence Gonzalez RN

## 2024-01-11 NOTE — PLAN OF CARE
Goal Outcome Evaluation:  Plan of Care Reviewed With: patient           Outcome Evaluation: patient has done well today has not voiced any complaints wound care done per order [facility] came and talked to patient about transfering there for wound care and ABX treatment will continue with plan of care

## 2024-01-11 NOTE — PLAN OF CARE
Goal Outcome Evaluation:  Plan of Care Reviewed With: patient           Outcome Evaluation: Pt resting in bed. WC done per order. No complaints noted at this time

## 2024-01-11 NOTE — PROGRESS NOTES
PROGRESS NOTE         Patient Identification:  Name:  Ken Krueger  Age:  48 y.o.  Sex:  male  :  1975  MRN:  [id number]  Visit Number:  [id number]  Primary Care Provider:  Franchesca Hodges APRN         LOS: 2 days       ----------------------------------------------------------------------------------------------------------------------  Subjective       Chief Complaints:    Wound Check (Pt states his primary sent him her for his bed sore on his buttock. )        Interval History:      The patient is awake and alert, resting comfortably in bed on room air with no apparent distress.  Afebrile.  Denies any increased colostomy output.  Lung exam is remarkable for expiratory wheezing bilaterally to auscultation.  Abdomen is soft and rounded with normoactive bowel sounds.  WBC is normal at 9.03.  Tissue/bone culture from 1/10/2024 preliminarily reports light growth gram-negative bacilli.  Blood cultures from 2024 preliminary report no growth at 2 days.  The patient reported that the wound care team performed a debridement of his sacral wound yesterday and reported they are considering holding off on wound VAC for now.    Review of Systems:    Constitutional: no fever, no chills. No night sweats. No appetite change or unexpected weight change. No fatigue.  Eyes: no eye drainage, itching or redness.  HEENT: no mouth sores, dysphagia or nose bleed.  Respiratory: no for shortness of breath, cough or production of sputum.  Cardiovascular: no chest pain, no palpitations, no orthopnea.  Gastrointestinal: no nausea, vomiting or diarrhea. No abdominal pain, hematemesis or rectal bleeding.  Genitourinary: no dysuria or polyuria.  Hematologic/lymphatic: no lymph node abnormalities, no easy bruising or easy bleeding.  Musculoskeletal: no muscle or joint pain.  Skin: Multiple decubitus ulcers.  Neurological: no loss of consciousness, no seizure, no headache.  Behavioral/Psych: no depression or suicidal  ideation.  Endocrine: no hot flashes.  Immunologic: negative.    ----------------------------------------------------------------------------------------------------------------------      Objective       [facility] Meds:  ARIPiprazole, 10 mg, Oral, Nightly  atorvastatin, 10 mg, Oral, Nightly  baclofen, 30 mg, Oral, 4x Daily With Meals & Nightly  bumetanide, 2 mg, Oral, Daily  carvedilol, 12.5 mg, Oral, BID With Meals  cefTRIAXone, 2,000 mg, Intravenous, Q24H  dantrolene, 100 mg, Oral, TID  DULoxetine, 60 mg, Oral, BID  ferrous sulfate, 325 mg, Oral, BID  gabapentin, 800 mg, Oral, Q8H  insulin lispro, 2-7 Units, Subcutaneous, 4x Daily AC & at Bedtime  methenamine, 1 g, Oral, BID With Meals  metOLazone, 5 mg, Oral, Once per day on Monday Wednesday Friday  metroNIDAZOLE, 500 mg, Oral, Q8H  modafinil, 200 mg, Oral, Daily  multivitamin with minerals, 1 tablet, Oral, Daily  pantoprazole, 40 mg, Oral, Q AM  potassium chloride, 10 mEq, Oral, Daily  sacubitril-valsartan, 1 tablet, Oral, BID  senna-docusate sodium, 2 tablet, Oral, BID  sodium chloride, 10 mL, Intravenous, Q12H  sodium hypochlorite, , Topical, BID  spironolactone, 25 mg, Oral, Daily  tiZANidine, 2 mg, Oral, TID  Vericiguat, 10 mg, Oral, Daily      sodium chloride, 75 mL/hr, Last Rate: 75 mL/hr (01/11/24 0432)      ----------------------------------------------------------------------------------------------------------------------    Vital Signs:  Temp:  [97.6 °F (36.4 °C)-98.1 °F (36.7 °C)] 98 °F (36.7 °C)  Heart Rate:  [79-91] 89  Resp:  [16-20] 18  BP: ()/(54-73) 100/64  No data found.    SpO2 Percentage    01/10/24 1900 01/11/24 0600 01/11/24 0648   SpO2: 92% 92% (!) 89%     SpO2:  [89 %-92 %] 89 %  on   ;   Device (Oxygen Therapy): room air    Body mass index is 41.37 kg/m².  Wt Readings from Last 3 Encounters:   01/09/24 135 kg (296 lb 9.6 oz)   12/27/23 (!) 148 kg (326 lb)   12/14/23 (!) 148 kg (326 lb)        Intake/Output Summary (Last 24  hours) at 1/11/2024 1110  Last data filed at 1/11/2024 1000  Gross per 24 hour   Intake 2979 ml   Output 2650 ml   Net 329 ml     Diet: Diabetic Diets; Consistent Carbohydrate; Texture: Regular Texture (IDDSI 7); Fluid Consistency: Thin (IDDSI 0)  ----------------------------------------------------------------------------------------------------------------------      Physical Exam:    Constitutional:  Well-developed and well-nourished.  Resting comfortably in bed, on room air with no respiratory distress.      HENT:  Head: Normocephalic and atraumatic.  Mouth:  Moist mucous membranes.    Eyes:  Conjunctivae and EOM are normal.  No scleral icterus.  Neck:  Neck supple.  No JVD present.    Cardiovascular:  Normal rate, regular rhythm and normal heart sounds with no murmur. No edema.  Pulmonary/Chest: Fine expiratory wheeze bilaterally to auscultation  Abdominal:  Soft.  Bowel sounds are normal.  No distension and no tenderness.  Ostomy.  Musculoskeletal: Left AKA.  Functional paraplegia.  Neurological:  Alert and oriented to person, place, and time.  No facial droop.  No slurred speech.   Skin:  Skin is warm and dry.  No rash noted.  No pallor.  Sacral decubitus ulcer, left gluteal pressure ulcer with no evidence of active infection. Right lower gluteal ulcer with necrotic tissue present.  Wounds are currently dressed, clean dry and intact   psychiatric:  Normal mood and affect.  Behavior is normal.        ----------------------------------------------------------------------------------------------------------------------            Results from last 7 days   Lab Units 01/11/24  0146 01/10/24  0248 01/09/24  0541 01/09/24 0312 01/08/24  2322   CRP mg/dL  --   --   --   --  15.63*   LACTATE mmol/L  --   --  2.0 2.1* 2.3*   WBC 10*3/mm3 9.03 8.05 11.62*  --  13.21*   HEMOGLOBIN g/dL 7.9* 7.6* 8.3*  --  9.0*   HEMATOCRIT % 27.9* 27.0* 30.0*  --  32.5*   MCV fL 83.5 83.9 83.6  --  83.8   MCHC g/dL 28.3* 28.1* 27.7*   "--  27.7*   PLATELETS 10*3/mm3 397 374 430  --  468*     Results from last 7 days   Lab Units 01/11/24  0145 01/10/24  0248 01/09/24  0541 01/08/24  2322   SODIUM mmol/L 134* 134* 134* 134*   POTASSIUM mmol/L 4.6 4.2 4.1 4.2   CHLORIDE mmol/L 103 102 100 99   CO2 mmol/L 22.1 22.9 23.7 24.7   BUN mg/dL 28* 22* 22* 22*   CREATININE mg/dL 0.92 0.93 0.98 0.97   CALCIUM mg/dL 8.6 8.4* 8.6 9.2   GLUCOSE mg/dL 124* 96 126* 113*   ALBUMIN g/dL  --   --  2.8* 3.4*   BILIRUBIN mg/dL  --   --  0.3 0.3   ALK PHOS U/L  --   --  124* 144*   AST (SGOT) U/L  --   --  11 10   ALT (SGPT) U/L  --   --  12 12   Estimated Creatinine Clearance: 140.8 mL/min (by C-G formula based on SCr of 0.92 mg/dL).  No results found for: \"AMMONIA\"    Glucose   Date/Time Value Ref Range Status   01/11/2024 1054 134 (H) 70 - 130 mg/dL Final   01/11/2024 0607 136 (H) 70 - 130 mg/dL Final   01/10/2024 2105 118 70 - 130 mg/dL Final   01/10/2024 1632 124 70 - 130 mg/dL Final   01/10/2024 1056 123 70 - 130 mg/dL Final   01/10/2024 0741 110 70 - 130 mg/dL Final   01/09/2024 2043 170 (H) 70 - 130 mg/dL Final   01/09/2024 1606 107 70 - 130 mg/dL Final     Lab Results   Component Value Date    HGBA1C 11.50 (H) 02/13/2023     Lab Results   Component Value Date    TSH 3.780 07/19/2022    FREET4 1.31 06/02/2021       Blood Culture   Date Value Ref Range Status   01/08/2024 No growth at 24 hours  Preliminary   01/08/2024 No growth at 24 hours  Preliminary     No results found for: \"URINECX\"  No results found for: \"WOUNDCX\"  No results found for: \"STOOLCX\"  No results found for: \"RESPCX\"  Pain Management Panel  More data exists         Latest Ref Rng & Units 4/9/2023 6/2/2021   Pain Management Panel   Creatinine, Urine mg/dL 79.5  -   Amphetamine, Urine Qual Negative - Negative    Barbiturates Screen, Urine Negative - Negative    Benzodiazepine Screen, Urine Negative - Negative    Buprenorphine, Screen, Urine Negative - Negative    Cocaine Screen, Urine Negative - " Negative    Methadone Screen , Urine Negative - Negative          ----------------------------------------------------------------------------------------------------------------------  Imaging Results (Last 24 Hours)       ** No results found for the last 24 hours. **            ----------------------------------------------------------------------------------------------------------------------    Pertinent Infectious Disease Results                Assessment/Plan       Assessment     Severe sepsis with lactic acid greater than 2 on admission  Acute on chronic osteomyelitis        Plan      The patient is awake and alert, resting comfortably in bed on room air with no apparent distress.  Afebrile.  Denies any increased colostomy output.  Lung exam is remarkable for expiratory wheezing bilaterally to auscultation.  Abdomen is soft and rounded with normoactive bowel sounds.  WBC is normal at 9.03.  Tissue/bone culture from 1/10/2024 preliminarily reports light growth gram-negative bacilli.  Blood cultures from 1/8/2024 preliminary report no growth at 2 days.  The patient reported that the wound care team performed a debridement of his sacral wound yesterday and reported they are considering holding off on wound VAC for now.    Recommend to continue with metronidazole 500 mg IV every 8 hours and ceftriaxone 2 g IV 24 hours, likely will require 4 to 6 weeks of IV antibiotic therapy based on CT scan showing evidence of osteomyelitis.  The patient reported the wound care team may hold off on wound VAC placement for now.  The patient would likely still benefit from AC referral for wound care and IV antibiotic therapy.    ANTIMICROBIAL THERAPY    amoxicillin-clavulanate - 500-125 MG  cefTRIAXone (ROCEPHIN) 2000 mg IVPB in 100 mL NS (VTB)  methenamine - 1 g, 1 g  metroNIDAZOLE - 250 MG     Code Status:   Code Status and Medical Interventions:   Ordered at: 01/09/24 0247     Code Status (Patient has no pulse and is not  breathing):    CPR (Attempt to Resuscitate)     Medical Interventions (Patient has pulse or is breathing):    Full Support       Jacinta Wade, GUANACO  01/11/24  11:10 EST

## 2024-01-12 LAB
ANION GAP SERPL CALCULATED.3IONS-SCNC: 12.8 MMOL/L (ref 5–15)
BUN SERPL-MCNC: 23 MG/DL (ref 6–20)
BUN/CREAT SERPL: 29.9 (ref 7–25)
CALCIUM SPEC-SCNC: 8.7 MG/DL (ref 8.6–10.5)
CHLORIDE SERPL-SCNC: 105 MMOL/L (ref 98–107)
CO2 SERPL-SCNC: 22.2 MMOL/L (ref 22–29)
CREAT SERPL-MCNC: 0.77 MG/DL (ref 0.76–1.27)
DEPRECATED RDW RBC AUTO: 51.1 FL (ref 37–54)
EGFRCR SERPLBLD CKD-EPI 2021: 111.8 ML/MIN/1.73
ERYTHROCYTE [DISTWIDTH] IN BLOOD BY AUTOMATED COUNT: 17 % (ref 12.3–15.4)
GLUCOSE BLDC GLUCOMTR-MCNC: 106 MG/DL (ref 70–130)
GLUCOSE BLDC GLUCOMTR-MCNC: 125 MG/DL (ref 70–130)
GLUCOSE BLDC GLUCOMTR-MCNC: 131 MG/DL (ref 70–130)
GLUCOSE BLDC GLUCOMTR-MCNC: 132 MG/DL (ref 70–130)
GLUCOSE SERPL-MCNC: 131 MG/DL (ref 65–99)
HCT VFR BLD AUTO: 28 % (ref 37.5–51)
HGB BLD-MCNC: 7.9 G/DL (ref 13–17.7)
MCH RBC QN AUTO: 23.2 PG (ref 26.6–33)
MCHC RBC AUTO-ENTMCNC: 28.2 G/DL (ref 31.5–35.7)
MCV RBC AUTO: 82.4 FL (ref 79–97)
PLATELET # BLD AUTO: 475 10*3/MM3 (ref 140–450)
PMV BLD AUTO: 9.1 FL (ref 6–12)
POTASSIUM SERPL-SCNC: 4.1 MMOL/L (ref 3.5–5.2)
RBC # BLD AUTO: 3.4 10*6/MM3 (ref 4.14–5.8)
SODIUM SERPL-SCNC: 140 MMOL/L (ref 136–145)
WBC NRBC COR # BLD AUTO: 9.65 10*3/MM3 (ref 3.4–10.8)

## 2024-01-12 PROCEDURE — 82948 REAGENT STRIP/BLOOD GLUCOSE: CPT

## 2024-01-12 PROCEDURE — 85027 COMPLETE CBC AUTOMATED: CPT | Performed by: INTERNAL MEDICINE

## 2024-01-12 PROCEDURE — 99232 SBSQ HOSP IP/OBS MODERATE 35: CPT | Performed by: INTERNAL MEDICINE

## 2024-01-12 PROCEDURE — 25810000003 SODIUM CHLORIDE 0.9 % SOLUTION: Performed by: INTERNAL MEDICINE

## 2024-01-12 PROCEDURE — 94799 UNLISTED PULMONARY SVC/PX: CPT

## 2024-01-12 PROCEDURE — 94664 DEMO&/EVAL PT USE INHALER: CPT

## 2024-01-12 PROCEDURE — 94761 N-INVAS EAR/PLS OXIMETRY MLT: CPT

## 2024-01-12 PROCEDURE — 25010000002 CEFTRIAXONE PER 250 MG: Performed by: NURSE PRACTITIONER

## 2024-01-12 PROCEDURE — 80048 BASIC METABOLIC PNL TOTAL CA: CPT | Performed by: INTERNAL MEDICINE

## 2024-01-12 RX ADMIN — DANTROLENE SODIUM 100 MG: 25 CAPSULE ORAL at 20:52

## 2024-01-12 RX ADMIN — METOLAZONE 5 MG: 2.5 TABLET ORAL at 08:17

## 2024-01-12 RX ADMIN — DAKIN'S SOLUTION 0.125% (QUARTER STRENGTH): 0.12 SOLUTION at 08:19

## 2024-01-12 RX ADMIN — ATORVASTATIN CALCIUM 10 MG: 10 TABLET, FILM COATED ORAL at 20:51

## 2024-01-12 RX ADMIN — TIZANIDINE 2 MG: 4 TABLET ORAL at 20:52

## 2024-01-12 RX ADMIN — SODIUM CHLORIDE 2000 MG: 9 INJECTION, SOLUTION INTRAVENOUS at 11:12

## 2024-01-12 RX ADMIN — DANTROLENE SODIUM 100 MG: 25 CAPSULE ORAL at 15:17

## 2024-01-12 RX ADMIN — BACLOFEN 30 MG: 10 TABLET ORAL at 18:27

## 2024-01-12 RX ADMIN — Medication 10 ML: at 08:27

## 2024-01-12 RX ADMIN — BUMETANIDE 2 MG: 1 TABLET ORAL at 08:16

## 2024-01-12 RX ADMIN — BACLOFEN 30 MG: 10 TABLET ORAL at 11:13

## 2024-01-12 RX ADMIN — CARVEDILOL 12.5 MG: 6.25 TABLET, FILM COATED ORAL at 08:16

## 2024-01-12 RX ADMIN — DULOXETINE HYDROCHLORIDE 60 MG: 60 CAPSULE, DELAYED RELEASE ORAL at 20:51

## 2024-01-12 RX ADMIN — IPRATROPIUM BROMIDE AND ALBUTEROL SULFATE 3 ML: 2.5; .5 SOLUTION RESPIRATORY (INHALATION) at 06:55

## 2024-01-12 RX ADMIN — ARIPIPRAZOLE 10 MG: 10 TABLET ORAL at 20:51

## 2024-01-12 RX ADMIN — DANTROLENE SODIUM 100 MG: 25 CAPSULE ORAL at 08:16

## 2024-01-12 RX ADMIN — METRONIDAZOLE 500 MG: 250 TABLET ORAL at 22:38

## 2024-01-12 RX ADMIN — IPRATROPIUM BROMIDE AND ALBUTEROL SULFATE 3 ML: 2.5; .5 SOLUTION RESPIRATORY (INHALATION) at 18:25

## 2024-01-12 RX ADMIN — SACUBITRIL AND VALSARTAN 1 TABLET: 24; 26 TABLET, FILM COATED ORAL at 20:52

## 2024-01-12 RX ADMIN — MODAFINIL 200 MG: 100 TABLET ORAL at 08:26

## 2024-01-12 RX ADMIN — METHENAMINE HIPPURATE 1 G: 1000 TABLET ORAL at 18:26

## 2024-01-12 RX ADMIN — DOCUSATE SODIUM 50 MG AND SENNOSIDES 8.6 MG 2 TABLET: 8.6; 5 TABLET, FILM COATED ORAL at 20:52

## 2024-01-12 RX ADMIN — POTASSIUM CHLORIDE 10 MEQ: 20 TABLET, EXTENDED RELEASE ORAL at 08:26

## 2024-01-12 RX ADMIN — DAKIN'S SOLUTION 0.125% (QUARTER STRENGTH): 0.12 SOLUTION at 20:54

## 2024-01-12 RX ADMIN — Medication 1 TABLET: at 08:17

## 2024-01-12 RX ADMIN — METRONIDAZOLE 500 MG: 250 TABLET ORAL at 06:07

## 2024-01-12 RX ADMIN — PANTOPRAZOLE SODIUM 40 MG: 40 TABLET, DELAYED RELEASE ORAL at 06:07

## 2024-01-12 RX ADMIN — BACLOFEN 30 MG: 10 TABLET ORAL at 20:51

## 2024-01-12 RX ADMIN — SACUBITRIL AND VALSARTAN 1 TABLET: 24; 26 TABLET, FILM COATED ORAL at 08:17

## 2024-01-12 RX ADMIN — METRONIDAZOLE 500 MG: 250 TABLET ORAL at 15:17

## 2024-01-12 RX ADMIN — Medication 10 ML: at 20:54

## 2024-01-12 RX ADMIN — SPIRONOLACTONE 25 MG: 25 TABLET, FILM COATED ORAL at 08:26

## 2024-01-12 RX ADMIN — TIZANIDINE 2 MG: 4 TABLET ORAL at 15:17

## 2024-01-12 RX ADMIN — METHENAMINE HIPPURATE 1 G: 1000 TABLET ORAL at 08:18

## 2024-01-12 RX ADMIN — DULOXETINE HYDROCHLORIDE 60 MG: 60 CAPSULE, DELAYED RELEASE ORAL at 08:16

## 2024-01-12 RX ADMIN — GABAPENTIN 800 MG: 400 CAPSULE ORAL at 22:38

## 2024-01-12 RX ADMIN — GABAPENTIN 800 MG: 400 CAPSULE ORAL at 06:07

## 2024-01-12 RX ADMIN — GABAPENTIN 800 MG: 400 CAPSULE ORAL at 15:17

## 2024-01-12 RX ADMIN — DOCUSATE SODIUM 50 MG AND SENNOSIDES 8.6 MG 2 TABLET: 8.6; 5 TABLET, FILM COATED ORAL at 08:16

## 2024-01-12 RX ADMIN — BACLOFEN 30 MG: 10 TABLET ORAL at 08:16

## 2024-01-12 RX ADMIN — FERROUS SULFATE TAB 325 MG (65 MG ELEMENTAL FE) 325 MG: 325 (65 FE) TAB at 20:51

## 2024-01-12 RX ADMIN — CARVEDILOL 12.5 MG: 6.25 TABLET, FILM COATED ORAL at 18:27

## 2024-01-12 RX ADMIN — SODIUM CHLORIDE 75 ML/HR: 9 INJECTION, SOLUTION INTRAVENOUS at 10:23

## 2024-01-12 RX ADMIN — TIZANIDINE 2 MG: 4 TABLET ORAL at 08:17

## 2024-01-12 RX ADMIN — FERROUS SULFATE TAB 325 MG (65 MG ELEMENTAL FE) 325 MG: 325 (65 FE) TAB at 08:17

## 2024-01-12 NOTE — PROGRESS NOTES
Antimicrobial Length of Therapy:    Metronidazole day 3 of 14  Ceftriaxone day 4 of 42    Yunior Beltre, PharmD  01/12/24  13:43 EST
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
## 2024-01-12 NOTE — PROGRESS NOTES
[facility] HOSPITALIST PROGRESS NOTE     Patient Identification:  Name:  Ken Krueger  Age:  48 y.o.  Sex:  male  :  1975  MRN:  [id number]  Visit Number:  [id number]  Primary Care Provider:  Franchesca Hodges APRN    Length of stay:  3    Chief complaint: Sacral pain    Subjective:    Patient seen and examined at bedside with no nursing staff present.  No new events overnight.  Patient is in good spirits and still agreeable to LTAC placement.  ----------------------------------------------------------------------------------------------------------------------  Current Hospital Meds:  ARIPiprazole, 10 mg, Oral, Nightly  atorvastatin, 10 mg, Oral, Nightly  baclofen, 30 mg, Oral, 4x Daily With Meals & Nightly  bumetanide, 2 mg, Oral, Daily  carvedilol, 12.5 mg, Oral, BID With Meals  cefTRIAXone, 2,000 mg, Intravenous, Q24H  dantrolene, 100 mg, Oral, TID  DULoxetine, 60 mg, Oral, BID  ferrous sulfate, 325 mg, Oral, BID  gabapentin, 800 mg, Oral, Q8H  insulin lispro, 2-7 Units, Subcutaneous, 4x Daily AC & at Bedtime  methenamine, 1 g, Oral, BID With Meals  metOLazone, 5 mg, Oral, Once per day on   metroNIDAZOLE, 500 mg, Oral, Q8H  modafinil, 200 mg, Oral, Daily  multivitamin with minerals, 1 tablet, Oral, Daily  pantoprazole, 40 mg, Oral, Q AM  potassium chloride, 10 mEq, Oral, Daily  sacubitril-valsartan, 1 tablet, Oral, BID  senna-docusate sodium, 2 tablet, Oral, BID  sodium chloride, 10 mL, Intravenous, Q12H  sodium hypochlorite, , Topical, BID  spironolactone, 25 mg, Oral, Daily  tiZANidine, 2 mg, Oral, TID  Vericiguat, 10 mg, Oral, Daily      sodium chloride, 75 mL/hr, Last Rate: 75 mL/hr (24 1023)      ----------------------------------------------------------------------------------------------------------------------  Vital Signs:  Temp:  [97.8 °F (36.6 °C)-98.6 °F (37 °C)] 98.6 °F (37 °C)  Heart Rate:  [59-96] 84  Resp:  [17-22] 19  BP: ()/(57-68)  109/68      01/08/24 2026 01/09/24  0443   Weight: (!) 148 kg (326 lb) 135 kg (296 lb 9.6 oz)     Body mass index is 41.37 kg/m².    Intake/Output Summary (Last 24 hours) at 1/12/2024 1751  Last data filed at 1/12/2024 1500  Gross per 24 hour   Intake 2720 ml   Output 3625 ml   Net -905 ml     Diet: Diabetic Diets; Consistent Carbohydrate; Texture: Regular Texture (IDDSI 7); Fluid Consistency: Thin (IDDSI 0)  ----------------------------------------------------------------------------------------------------------------------  Physical exam:  Constitutional: Morbidly obese by BMI  male in no apparent distress.     HENT:  Head:  Normocephalic and atraumatic.  Mouth:  Moist mucous membranes.    Eyes:  Conjunctivae and EOM are normal.  Pupils are equal, round, and reactive to light.  No scleral icterus.    Neck:  Neck supple. No thyromegaly.  No JVD present.    Cardiovascular:  Regular rate and rhythm with no murmurs, rubs, clicks or gallops appreciated.  Pulmonary/Chest:  Clear to auscultation bilaterally with no crackles, wheezes or rhonchi appreciated.  Abdominal:  Soft. Nontender. Nondistended  Bowel sounds are normal in all four quadrants. No organomegally appreciated.   Musculoskeletal:  No edema, no tenderness, and no deformity.  No red or swollen joints anywhere.    Neurological:  Alert, Cranial nerves II-XII intact with no focal deficits.  No facial droop.  No slurred speech.   Skin:  Warm and dry to palpation with no rashes or lesions appreciated.  Peripheral vascular:  2+ radial and pedal pulses in bilateral upper and lower extremities.  Psychiatric:  Alert and oriented x3, demonstrates appropriate judgment and insight.    No significant change in physical exam in comparison to  "1/11/2024  ------------------------------------------------------------------------------------------------------------  ----------------------------------------------------------------------------------------------------------------------      Results from last 7 days   Lab Units 01/12/24  0115 01/11/24  0146 01/10/24  0248 01/09/24  0541 01/09/24  0312 01/08/24  2322   CRP mg/dL  --   --   --   --   --  15.63*   LACTATE mmol/L  --   --   --  2.0 2.1* 2.3*   WBC 10*3/mm3 9.65 9.03 8.05 11.62*  --  13.21*   HEMOGLOBIN g/dL 7.9* 7.9* 7.6* 8.3*  --  9.0*   HEMATOCRIT % 28.0* 27.9* 27.0* 30.0*  --  32.5*   MCV fL 82.4 83.5 83.9 83.6  --  83.8   MCHC g/dL 28.2* 28.3* 28.1* 27.7*  --  27.7*   PLATELETS 10*3/mm3 475* 397 374 430  --  468*         Results from last 7 days   Lab Units 01/12/24  0115 01/11/24  0145 01/10/24  0248 01/09/24  0541 01/08/24  2322   SODIUM mmol/L 140 134* 134* 134* 134*   POTASSIUM mmol/L 4.1 4.6 4.2 4.1 4.2   CHLORIDE mmol/L 105 103 102 100 99   CO2 mmol/L 22.2 22.1 22.9 23.7 24.7   BUN mg/dL 23* 28* 22* 22* 22*   CREATININE mg/dL 0.77 0.92 0.93 0.98 0.97   CALCIUM mg/dL 8.7 8.6 8.4* 8.6 9.2   GLUCOSE mg/dL 131* 124* 96 126* 113*   ALBUMIN g/dL  --   --   --  2.8* 3.4*   BILIRUBIN mg/dL  --   --   --  0.3 0.3   ALK PHOS U/L  --   --   --  124* 144*   AST (SGOT) U/L  --   --   --  11 10   ALT (SGPT) U/L  --   --   --  12 12   Estimated Creatinine Clearance: 168.2 mL/min (by C-G formula based on SCr of 0.77 mg/dL).    No results found for: \"AMMONIA\"      Blood Culture   Date Value Ref Range Status   01/08/2024 No growth at 24 hours  Preliminary   01/08/2024 No growth at 24 hours  Preliminary     No results found for: \"URINECX\"  No results found for: \"WOUNDCX\"  No results found for: \"STOOLCX\"    I have personally looked at the labs and they are summarized above.  ----------------------------------------------------------------------------------------------------------------------  Imaging Results " (Last 24 Hours)       ** No results found for the last 24 hours. **          ----------------------------------------------------------------------------------------------------------------------  Assessment and Plan:    Sacral decubitus ulcer - patient did have bedside debridement performed yesterday.  Unfortunately, patient is not an appropriate candidate for wound VAC at this time.  Will continue wound care per wound care nurse practitioner recommendations.  LTAC consult currently pending.      2.  Acute on chronic osteomyelitis - continue  Rocephin and Flagyl per ID recommendations.  Will likely require IV antibiotic therapy for 4 to 6 weeks.  Will place consult for PICC line placement today.    3.  Severe sepsis -present on admission, secondary to chronic sacral decubitus ulcer    4.  Chronic HFpEF/HFrEF -no evidence of exacerbation at this time    5.  Essential hypertension -well-controlled    6.  Type 2 diabetes mellitus -continue Accu-Cheks before every meal and nightly with sliding scale insulin    7.  Morbid obesity -complicates all aspects of care    Disposition will likely require several more days hospitalization    Harlan Fernández DO   01/12/24   17:51 EST

## 2024-01-12 NOTE — PLAN OF CARE
Goal Outcome Evaluation:  Plan of Care Reviewed With: patient        Progress: no change  Outcome Evaluation: Patient sitting up in the bed. Currently on 2L NC. Wound care complete per order. VSS. No acute distress noted at this time. Will continue with POC.

## 2024-01-12 NOTE — CASE MANAGEMENT/SOCIAL WORK
Discharge Planning Assessment   Fabricio     Patient Name: Ken Krueger  MRN: [id number]  Today's Date: 1/12/2024    Admit Date: 1/8/2024    Plan:  spoke with Hollie in [facility] pre-auth still pending.                    Laurence Gonzalez RN

## 2024-01-12 NOTE — PLAN OF CARE
Goal Outcome Evaluation:  Plan of Care Reviewed With: patient        Progress: no change  Outcome Evaluation: Pt has rested in bed overnight. VSS. Wound care completed per orders. No acute changes. Will continue POC.

## 2024-01-12 NOTE — PROGRESS NOTES
PROGRESS NOTE         Patient Identification:  Name:  Ken Krueger  Age:  48 y.o.  Sex:  male  :  1975  MRN:  [id number]  Visit Number:  [id number]  Primary Care Provider:  Franchesca Hodges APRN         LOS: 3 days       ----------------------------------------------------------------------------------------------------------------------  Subjective       Chief Complaints:    Wound Check (Pt states his primary sent him her for his bed sore on his buttock. )        Interval History:      The patient is awake and alert, resting comfortably in bed.  On 2 L nasal cannula with no respiratory distress.  Afebrile.  No excess output from colostomy.  Lung exam is clear to auscultation bilaterally.  Abdomen is soft and rounded with normoactive bowel sounds.  WBC is normal at 9.65.  Tissue/bone culture from the left buttock on 1/10 preliminarily reports light growth gram-negative bacilli.  The patient reports wound care team will be reevaluating for possible wound VAC placement today.      Review of Systems:    Constitutional: no fever, no chills. No night sweats. No appetite change or unexpected weight change. No fatigue.  Eyes: no eye drainage, itching or redness.  HEENT: no mouth sores, dysphagia or nose bleed.  Respiratory: no for shortness of breath, cough or production of sputum.  Cardiovascular: no chest pain, no palpitations, no orthopnea.  Gastrointestinal: no nausea, vomiting or diarrhea. No abdominal pain, hematemesis or rectal bleeding.  Genitourinary: no dysuria or polyuria.  Hematologic/lymphatic: no lymph node abnormalities, no easy bruising or easy bleeding.  Musculoskeletal: no muscle or joint pain.  Skin: Multiple decubitus ulcers.  Neurological: no loss of consciousness, no seizure, no headache.  Behavioral/Psych: no depression or suicidal ideation.  Endocrine: no hot flashes.  Immunologic:  negative.    ----------------------------------------------------------------------------------------------------------------------      Objective       [facility] Meds:  ARIPiprazole, 10 mg, Oral, Nightly  atorvastatin, 10 mg, Oral, Nightly  baclofen, 30 mg, Oral, 4x Daily With Meals & Nightly  bumetanide, 2 mg, Oral, Daily  carvedilol, 12.5 mg, Oral, BID With Meals  cefTRIAXone, 2,000 mg, Intravenous, Q24H  dantrolene, 100 mg, Oral, TID  DULoxetine, 60 mg, Oral, BID  ferrous sulfate, 325 mg, Oral, BID  gabapentin, 800 mg, Oral, Q8H  insulin lispro, 2-7 Units, Subcutaneous, 4x Daily AC & at Bedtime  methenamine, 1 g, Oral, BID With Meals  metOLazone, 5 mg, Oral, Once per day on Monday Wednesday Friday  metroNIDAZOLE, 500 mg, Oral, Q8H  modafinil, 200 mg, Oral, Daily  multivitamin with minerals, 1 tablet, Oral, Daily  pantoprazole, 40 mg, Oral, Q AM  potassium chloride, 10 mEq, Oral, Daily  sacubitril-valsartan, 1 tablet, Oral, BID  senna-docusate sodium, 2 tablet, Oral, BID  sodium chloride, 10 mL, Intravenous, Q12H  sodium hypochlorite, , Topical, BID  spironolactone, 25 mg, Oral, Daily  tiZANidine, 2 mg, Oral, TID  Vericiguat, 10 mg, Oral, Daily      sodium chloride, 75 mL/hr, Last Rate: 75 mL/hr (01/11/24 2217)      ----------------------------------------------------------------------------------------------------------------------    Vital Signs:  Temp:  [97.8 °F (36.6 °C)-98.3 °F (36.8 °C)] 98.3 °F (36.8 °C)  Heart Rate:  [89-96] 89  Resp:  [17-20] 18  BP: ()/(57-68) 105/61  No data found.    SpO2 Percentage    01/11/24 2327 01/11/24 2330 01/12/24 0600   SpO2: (!) 84% 94% 93%     SpO2:  [83 %-98 %] 93 %  on  Flow (L/min):  [2] 2;   Device (Oxygen Therapy): nasal cannula    Body mass index is 41.37 kg/m².  Wt Readings from Last 3 Encounters:   01/09/24 135 kg (296 lb 9.6 oz)   12/27/23 (!) 148 kg (326 lb)   12/14/23 (!) 148 kg (326 lb)        Intake/Output Summary (Last 24 hours) at 1/12/2024  0828  Last data filed at 1/12/2024 0600  Gross per 24 hour   Intake 3080 ml   Output 3550 ml   Net -470 ml     Diet: Diabetic Diets; Consistent Carbohydrate; Texture: Regular Texture (IDDSI 7); Fluid Consistency: Thin (IDDSI 0)  ----------------------------------------------------------------------------------------------------------------------      Physical Exam:    Constitutional:  Well-developed and well-nourished.  Awake and alert, resting comfortably in bed on 2 L nasal cannula.  No respiratory distress.  Denies any complaints.    HENT:  Head: Normocephalic and atraumatic.  Mouth:  Moist mucous membranes.    Eyes:  Conjunctivae and EOM are normal.  No scleral icterus.  Neck:  Neck supple.  No JVD present.    Cardiovascular:  Normal rate, regular rhythm and normal heart sounds with no murmur. No edema.  Pulmonary/Chest: Clear to auscultation bilaterally  Abdominal:  Soft.  Bowel sounds are normal.  No distension and no tenderness.  Ostomy.  Musculoskeletal: Left AKA.  Functional paraplegia.  Neurological:  Alert and oriented to person, place, and time.  No facial droop.  No slurred speech.   Skin:  Skin is warm and dry.  No rash noted.  No pallor.  Sacral decubitus ulcer, left gluteal pressure ulcer with no evidence of active infection. Right lower gluteal ulcer with necrotic tissue present.  Wounds are currently dressed, clean dry and intact   psychiatric:  Normal mood and affect.  Behavior is normal.        ----------------------------------------------------------------------------------------------------------------------            Results from last 7 days   Lab Units 01/12/24  0115 01/11/24  0146 01/10/24  0248 01/09/24  0541 01/09/24  0312 01/08/24  2322   CRP mg/dL  --   --   --   --   --  15.63*   LACTATE mmol/L  --   --   --  2.0 2.1* 2.3*   WBC 10*3/mm3 9.65 9.03 8.05 11.62*  --  13.21*   HEMOGLOBIN g/dL 7.9* 7.9* 7.6* 8.3*  --  9.0*   HEMATOCRIT % 28.0* 27.9* 27.0* 30.0*  --  32.5*   MCV fL 82.4  "83.5 83.9 83.6  --  83.8   MCHC g/dL 28.2* 28.3* 28.1* 27.7*  --  27.7*   PLATELETS 10*3/mm3 475* 397 374 430  --  468*     Results from last 7 days   Lab Units 01/12/24  0115 01/11/24  0145 01/10/24  0248 01/09/24  0541 01/08/24  2322   SODIUM mmol/L 140 134* 134* 134* 134*   POTASSIUM mmol/L 4.1 4.6 4.2 4.1 4.2   CHLORIDE mmol/L 105 103 102 100 99   CO2 mmol/L 22.2 22.1 22.9 23.7 24.7   BUN mg/dL 23* 28* 22* 22* 22*   CREATININE mg/dL 0.77 0.92 0.93 0.98 0.97   CALCIUM mg/dL 8.7 8.6 8.4* 8.6 9.2   GLUCOSE mg/dL 131* 124* 96 126* 113*   ALBUMIN g/dL  --   --   --  2.8* 3.4*   BILIRUBIN mg/dL  --   --   --  0.3 0.3   ALK PHOS U/L  --   --   --  124* 144*   AST (SGOT) U/L  --   --   --  11 10   ALT (SGPT) U/L  --   --   --  12 12   Estimated Creatinine Clearance: 168.2 mL/min (by C-G formula based on SCr of 0.77 mg/dL).  No results found for: \"AMMONIA\"    Glucose   Date/Time Value Ref Range Status   01/12/2024 0618 132 (H) 70 - 130 mg/dL Final   01/11/2024 2211 125 70 - 130 mg/dL Final   01/11/2024 1658 128 70 - 130 mg/dL Final   01/11/2024 1054 134 (H) 70 - 130 mg/dL Final   01/11/2024 0607 136 (H) 70 - 130 mg/dL Final   01/10/2024 2105 118 70 - 130 mg/dL Final   01/10/2024 1632 124 70 - 130 mg/dL Final   01/10/2024 1056 123 70 - 130 mg/dL Final     Lab Results   Component Value Date    HGBA1C 11.50 (H) 02/13/2023     Lab Results   Component Value Date    TSH 3.780 07/19/2022    FREET4 1.31 06/02/2021       Blood Culture   Date Value Ref Range Status   01/08/2024 No growth at 24 hours  Preliminary   01/08/2024 No growth at 24 hours  Preliminary     No results found for: \"URINECX\"  No results found for: \"WOUNDCX\"  No results found for: \"STOOLCX\"  No results found for: \"RESPCX\"  Pain Management Panel  More data exists         Latest Ref Rng & Units 4/9/2023 6/2/2021   Pain Management Panel   Creatinine, Urine mg/dL 79.5  -   Amphetamine, Urine Qual Negative - Negative    Barbiturates Screen, Urine Negative - Negative  "   Benzodiazepine Screen, Urine Negative - Negative    Buprenorphine, Screen, Urine Negative - Negative    Cocaine Screen, Urine Negative - Negative    Methadone Screen , Urine Negative - Negative          ----------------------------------------------------------------------------------------------------------------------  Imaging Results (Last 24 Hours)       ** No results found for the last 24 hours. **            ----------------------------------------------------------------------------------------------------------------------    Pertinent Infectious Disease Results        Assessment/Plan       Assessment     Severe sepsis with lactic acid greater than 2 on admission  Acute on chronic osteomyelitis        Plan      I saw and examined the patient myself this morning with GUANACO Winston and discussed the plan of care with her and primary RN and here are my findings:    The patient is currently awake and alert, comfortably resting in bed while receiving 2 L of nasal cannula with no signs of respiratory distress. The patient is afebrile, and there is no excessive output from the colostomy. A clear lung exam reveals normal auscultation bilaterally. The abdomen is soft, rounded, and exhibits normoactive bowel sounds. The WBC count is within the normal range at 9.65.    Preliminary reports from a tissue/bone culture taken from the left buttock on 1/10 indicate light growth of gram-negative bacilli. The patient has informed us that the wound care team will be reassessing today, considering the possibility of placing a wound VAC.    The treatment plan involves continuing with metronidazole at 500 mg IV every 8 hours and ceftriaxone at 2 g IV every 24 hours. The anticipated duration of IV antibiotic therapy is 4 to 6 weeks based on CT scan findings that indicate evidence of osteomyelitis. The wound care team will be reevaluating today for potential wound VAC placement. It is suggested that the patient may benefit  from a referral to a Long-Term Acute Care facility for ongoing IV antibiotic therapy and specialized wound care.      ANTIMICROBIAL THERAPY    amoxicillin-clavulanate - 500-125 MG  cefTRIAXone (ROCEPHIN) 2000 mg IVPB in 100 mL NS (VTB)  methenamine - 1 g, 1 g  metroNIDAZOLE - 250 MG     Code Status:   Code Status and Medical Interventions:   Ordered at: 01/09/24 0247     Code Status (Patient has no pulse and is not breathing):    CPR (Attempt to Resuscitate)     Medical Interventions (Patient has pulse or is breathing):    Full Support       GUANACO Finley  01/12/24  08:28 EST    Electronically signed by GUANACO Finley, 01/12/24, 8:31 AM EST.    Electronically signed by Tra Jain MD, 01/12/24, 11:58 AM EST.

## 2024-01-13 LAB
A-A DO2: 68.3 MMHG (ref 0–300)
A-A DO2: 78.6 MMHG (ref 0–300)
AMMONIA BLD-SCNC: 33 UMOL/L (ref 16–60)
AMPHET+METHAMPHET UR QL: NEGATIVE
AMPHETAMINES UR QL: NEGATIVE
ANION GAP SERPL CALCULATED.3IONS-SCNC: 11.2 MMOL/L (ref 5–15)
ARTERIAL PATENCY WRIST A: ABNORMAL
ARTERIAL PATENCY WRIST A: POSITIVE
ATMOSPHERIC PRESS: 714 MMHG
ATMOSPHERIC PRESS: 724 MMHG
BACTERIA SPEC AEROBE CULT: NORMAL
BACTERIA SPEC AEROBE CULT: NORMAL
BARBITURATES UR QL SCN: NEGATIVE
BASE EXCESS BLDA CALC-SCNC: -0.2 MMOL/L (ref 0–2)
BASE EXCESS BLDA CALC-SCNC: -2 MMOL/L (ref 0–2)
BDY SITE: ABNORMAL
BDY SITE: ABNORMAL
BENZODIAZ UR QL SCN: NEGATIVE
BUN SERPL-MCNC: 19 MG/DL (ref 6–20)
BUN/CREAT SERPL: 25.3 (ref 7–25)
BUPRENORPHINE SERPL-MCNC: NEGATIVE NG/ML
CALCIUM SPEC-SCNC: 8.6 MG/DL (ref 8.6–10.5)
CANNABINOIDS SERPL QL: NEGATIVE
CHLORIDE SERPL-SCNC: 108 MMOL/L (ref 98–107)
CO2 BLDA-SCNC: 25.3 MMOL/L (ref 22–33)
CO2 BLDA-SCNC: 26.9 MMOL/L (ref 22–33)
CO2 SERPL-SCNC: 21.8 MMOL/L (ref 22–29)
COCAINE UR QL: NEGATIVE
COHGB MFR BLD: 2 % (ref 0–5)
COHGB MFR BLD: 2 % (ref 0–5)
CREAT SERPL-MCNC: 0.75 MG/DL (ref 0.76–1.27)
DEPRECATED RDW RBC AUTO: 52.8 FL (ref 37–54)
EGFRCR SERPLBLD CKD-EPI 2021: 112.7 ML/MIN/1.73
ERYTHROCYTE [DISTWIDTH] IN BLOOD BY AUTOMATED COUNT: 17.2 % (ref 12.3–15.4)
FENTANYL UR-MCNC: NEGATIVE NG/ML
GAS FLOW AIRWAY: 2 LPM
GAS FLOW AIRWAY: 2 LPM
GLUCOSE BLDC GLUCOMTR-MCNC: 108 MG/DL (ref 70–130)
GLUCOSE BLDC GLUCOMTR-MCNC: 117 MG/DL (ref 70–130)
GLUCOSE BLDC GLUCOMTR-MCNC: 118 MG/DL (ref 70–130)
GLUCOSE BLDC GLUCOMTR-MCNC: 131 MG/DL (ref 70–130)
GLUCOSE BLDC GLUCOMTR-MCNC: 136 MG/DL (ref 70–130)
GLUCOSE SERPL-MCNC: 129 MG/DL (ref 65–99)
HCO3 BLDA-SCNC: 23.9 MMOL/L (ref 20–26)
HCO3 BLDA-SCNC: 25.5 MMOL/L (ref 20–26)
HCT VFR BLD AUTO: 29.5 % (ref 37.5–51)
HCT VFR BLD CALC: 24.8 % (ref 38–51)
HCT VFR BLD CALC: 26.2 % (ref 38–51)
HGB BLD-MCNC: 8 G/DL (ref 13–17.7)
HGB BLDA-MCNC: 8.1 G/DL (ref 14–18)
HGB BLDA-MCNC: 8.5 G/DL (ref 14–18)
INHALED O2 CONCENTRATION: 28 %
INHALED O2 CONCENTRATION: 28 %
Lab: ABNORMAL
Lab: ABNORMAL
MCH RBC QN AUTO: 22.9 PG (ref 26.6–33)
MCHC RBC AUTO-ENTMCNC: 27.1 G/DL (ref 31.5–35.7)
MCV RBC AUTO: 84.5 FL (ref 79–97)
METHADONE UR QL SCN: NEGATIVE
METHGB BLD QL: 0 % (ref 0–3)
METHGB BLD QL: 0 % (ref 0–3)
MODALITY: ABNORMAL
MODALITY: ABNORMAL
OPIATES UR QL: NEGATIVE
OXYCODONE UR QL SCN: NEGATIVE
OXYHGB MFR BLDV: 88.5 % (ref 94–99)
OXYHGB MFR BLDV: 90.9 % (ref 94–99)
PCO2 BLDA: 45.4 MM HG (ref 35–45)
PCO2 BLDA: 45.8 MM HG (ref 35–45)
PCO2 TEMP ADJ BLD: ABNORMAL MM[HG]
PCO2 TEMP ADJ BLD: ABNORMAL MM[HG]
PCP UR QL SCN: NEGATIVE
PH BLDA: 7.33 PH UNITS (ref 7.35–7.45)
PH BLDA: 7.35 PH UNITS (ref 7.35–7.45)
PH, TEMP CORRECTED: ABNORMAL
PH, TEMP CORRECTED: ABNORMAL
PLATELET # BLD AUTO: 422 10*3/MM3 (ref 140–450)
PMV BLD AUTO: 8.8 FL (ref 6–12)
PO2 BLDA: 60 MM HG (ref 83–108)
PO2 BLDA: 67.8 MM HG (ref 83–108)
PO2 TEMP ADJ BLD: ABNORMAL MM[HG]
PO2 TEMP ADJ BLD: ABNORMAL MM[HG]
POTASSIUM SERPL-SCNC: 3.8 MMOL/L (ref 3.5–5.2)
RBC # BLD AUTO: 3.49 10*6/MM3 (ref 4.14–5.8)
SAO2 % BLDCOA: 90.3 % (ref 94–99)
SAO2 % BLDCOA: 92.8 % (ref 94–99)
SODIUM SERPL-SCNC: 141 MMOL/L (ref 136–145)
TRICYCLICS UR QL SCN: NEGATIVE
VENTILATOR MODE: ABNORMAL
VENTILATOR MODE: ABNORMAL
WBC NRBC COR # BLD AUTO: 8.31 10*3/MM3 (ref 3.4–10.8)

## 2024-01-13 PROCEDURE — 25010000002 CEFTRIAXONE PER 250 MG: Performed by: NURSE PRACTITIONER

## 2024-01-13 PROCEDURE — 80048 BASIC METABOLIC PNL TOTAL CA: CPT | Performed by: INTERNAL MEDICINE

## 2024-01-13 PROCEDURE — 85027 COMPLETE CBC AUTOMATED: CPT | Performed by: INTERNAL MEDICINE

## 2024-01-13 PROCEDURE — 82140 ASSAY OF AMMONIA: CPT | Performed by: INTERNAL MEDICINE

## 2024-01-13 PROCEDURE — 82805 BLOOD GASES W/O2 SATURATION: CPT

## 2024-01-13 PROCEDURE — 99233 SBSQ HOSP IP/OBS HIGH 50: CPT | Performed by: INTERNAL MEDICINE

## 2024-01-13 PROCEDURE — 25810000003 SODIUM CHLORIDE 0.9 % SOLUTION: Performed by: INTERNAL MEDICINE

## 2024-01-13 PROCEDURE — 94799 UNLISTED PULMONARY SVC/PX: CPT

## 2024-01-13 PROCEDURE — 82948 REAGENT STRIP/BLOOD GLUCOSE: CPT

## 2024-01-13 PROCEDURE — 87040 BLOOD CULTURE FOR BACTERIA: CPT | Performed by: INTERNAL MEDICINE

## 2024-01-13 PROCEDURE — 82375 ASSAY CARBOXYHB QUANT: CPT

## 2024-01-13 PROCEDURE — 99232 SBSQ HOSP IP/OBS MODERATE 35: CPT | Performed by: NURSE PRACTITIONER

## 2024-01-13 PROCEDURE — 94664 DEMO&/EVAL PT USE INHALER: CPT

## 2024-01-13 PROCEDURE — 94761 N-INVAS EAR/PLS OXIMETRY MLT: CPT

## 2024-01-13 PROCEDURE — 80307 DRUG TEST PRSMV CHEM ANLYZR: CPT | Performed by: INTERNAL MEDICINE

## 2024-01-13 PROCEDURE — 83050 HGB METHEMOGLOBIN QUAN: CPT

## 2024-01-13 PROCEDURE — 36600 WITHDRAWAL OF ARTERIAL BLOOD: CPT

## 2024-01-13 RX ORDER — IPRATROPIUM BROMIDE AND ALBUTEROL SULFATE 2.5; .5 MG/3ML; MG/3ML
3 SOLUTION RESPIRATORY (INHALATION) EVERY 4 HOURS PRN
Status: DISCONTINUED | OUTPATIENT
Start: 2024-01-13 | End: 2024-01-19 | Stop reason: HOSPADM

## 2024-01-13 RX ADMIN — DOCUSATE SODIUM 50 MG AND SENNOSIDES 8.6 MG 2 TABLET: 8.6; 5 TABLET, FILM COATED ORAL at 21:13

## 2024-01-13 RX ADMIN — SODIUM CHLORIDE 2000 MG: 9 INJECTION, SOLUTION INTRAVENOUS at 12:26

## 2024-01-13 RX ADMIN — Medication 1 TABLET: at 08:54

## 2024-01-13 RX ADMIN — Medication 10 ML: at 08:56

## 2024-01-13 RX ADMIN — SACUBITRIL AND VALSARTAN 1 TABLET: 24; 26 TABLET, FILM COATED ORAL at 21:13

## 2024-01-13 RX ADMIN — GABAPENTIN 800 MG: 400 CAPSULE ORAL at 21:13

## 2024-01-13 RX ADMIN — METHENAMINE HIPPURATE 1 G: 1000 TABLET ORAL at 17:11

## 2024-01-13 RX ADMIN — METRONIDAZOLE 500 MG: 250 TABLET ORAL at 15:22

## 2024-01-13 RX ADMIN — BUMETANIDE 2 MG: 1 TABLET ORAL at 08:54

## 2024-01-13 RX ADMIN — SPIRONOLACTONE 25 MG: 25 TABLET, FILM COATED ORAL at 08:54

## 2024-01-13 RX ADMIN — DANTROLENE SODIUM 100 MG: 25 CAPSULE ORAL at 17:11

## 2024-01-13 RX ADMIN — DOCUSATE SODIUM 50 MG AND SENNOSIDES 8.6 MG 2 TABLET: 8.6; 5 TABLET, FILM COATED ORAL at 08:55

## 2024-01-13 RX ADMIN — DULOXETINE HYDROCHLORIDE 60 MG: 60 CAPSULE, DELAYED RELEASE ORAL at 21:13

## 2024-01-13 RX ADMIN — Medication 10 ML: at 21:29

## 2024-01-13 RX ADMIN — METRONIDAZOLE 500 MG: 250 TABLET ORAL at 21:13

## 2024-01-13 RX ADMIN — BACLOFEN 30 MG: 10 TABLET ORAL at 08:59

## 2024-01-13 RX ADMIN — BACLOFEN 30 MG: 10 TABLET ORAL at 17:11

## 2024-01-13 RX ADMIN — MODAFINIL 200 MG: 100 TABLET ORAL at 08:54

## 2024-01-13 RX ADMIN — IPRATROPIUM BROMIDE AND ALBUTEROL SULFATE 3 ML: 2.5; .5 SOLUTION RESPIRATORY (INHALATION) at 06:45

## 2024-01-13 RX ADMIN — DAKIN'S SOLUTION 0.125% (QUARTER STRENGTH): 0.12 SOLUTION at 21:29

## 2024-01-13 RX ADMIN — SODIUM CHLORIDE 75 ML/HR: 9 INJECTION, SOLUTION INTRAVENOUS at 01:45

## 2024-01-13 RX ADMIN — ATORVASTATIN CALCIUM 10 MG: 10 TABLET, FILM COATED ORAL at 21:13

## 2024-01-13 RX ADMIN — TIZANIDINE 2 MG: 4 TABLET ORAL at 15:23

## 2024-01-13 RX ADMIN — DULOXETINE HYDROCHLORIDE 60 MG: 60 CAPSULE, DELAYED RELEASE ORAL at 08:54

## 2024-01-13 RX ADMIN — TIZANIDINE 2 MG: 4 TABLET ORAL at 08:59

## 2024-01-13 RX ADMIN — TIZANIDINE 2 MG: 4 TABLET ORAL at 21:13

## 2024-01-13 RX ADMIN — FERROUS SULFATE TAB 325 MG (65 MG ELEMENTAL FE) 325 MG: 325 (65 FE) TAB at 21:13

## 2024-01-13 RX ADMIN — FERROUS SULFATE TAB 325 MG (65 MG ELEMENTAL FE) 325 MG: 325 (65 FE) TAB at 08:54

## 2024-01-13 RX ADMIN — DAKIN'S SOLUTION 0.125% (QUARTER STRENGTH): 0.12 SOLUTION at 08:57

## 2024-01-13 RX ADMIN — BACLOFEN 30 MG: 10 TABLET ORAL at 21:13

## 2024-01-13 RX ADMIN — ARIPIPRAZOLE 10 MG: 10 TABLET ORAL at 21:13

## 2024-01-13 RX ADMIN — BACLOFEN 30 MG: 10 TABLET ORAL at 12:26

## 2024-01-13 RX ADMIN — SACUBITRIL AND VALSARTAN 1 TABLET: 24; 26 TABLET, FILM COATED ORAL at 08:54

## 2024-01-13 RX ADMIN — DANTROLENE SODIUM 100 MG: 25 CAPSULE ORAL at 21:13

## 2024-01-13 RX ADMIN — CARVEDILOL 12.5 MG: 6.25 TABLET, FILM COATED ORAL at 08:54

## 2024-01-13 RX ADMIN — DANTROLENE SODIUM 100 MG: 25 CAPSULE ORAL at 08:54

## 2024-01-13 RX ADMIN — METHENAMINE HIPPURATE 1 G: 1000 TABLET ORAL at 08:54

## 2024-01-13 RX ADMIN — POTASSIUM CHLORIDE 10 MEQ: 20 TABLET, EXTENDED RELEASE ORAL at 08:55

## 2024-01-13 NOTE — NURSING NOTE
"0140: Patient noted to have mental status change at this time. Pt slurring words and repeating \"I'm at hospital\" when asked his name, birthday, and year. APRN notified. STAT ABG ordered. Continuous IV fluids stopped per orders.       0200: Patient able to answer orientation questions at this time. Pt states he is \"just tired\". APRN aware.  "

## 2024-01-13 NOTE — PLAN OF CARE
Goal Outcome Evaluation:              Outcome Evaluation: Pt resting in bed at this time. 2L NC in place. Wound care completed per orders. Bedrest. Will continue POC.

## 2024-01-13 NOTE — PROGRESS NOTES
PROGRESS NOTE         Patient Identification:  Name:  Ken Krueger  Age:  48 y.o.  Sex:  male  :  1975  MRN:  [id number]  Visit Number:  [id number]  Primary Care Provider:  Franchesca Hodges APRN         LOS: 4 days       ----------------------------------------------------------------------------------------------------------------------  Subjective       Chief Complaints:    Wound Check (Pt states his primary sent him her for his bed sore on his buttock. )        Interval History:      The patient is resting comfortably in sand bed, easily arousable.  On 2 L nasal cannula with no apparent distress.  Afebrile.  Denies any excess colostomy output.  Lung exam is clear to auscultation bilaterally.  Abdomen is soft and nontender with normoactive bowel sounds.  WBC is normal at 8.31.  Tissue/bone culture from 1/10 preliminarily reports light growth E. coli and scant growth gram-negative bacilli.    Review of Systems:    Constitutional: no fever, no chills. No night sweats. No appetite change or unexpected weight change. No fatigue.  Eyes: no eye drainage, itching or redness.  HEENT: no mouth sores, dysphagia or nose bleed.  Respiratory: no for shortness of breath, cough or production of sputum.  Cardiovascular: no chest pain, no palpitations, no orthopnea.  Gastrointestinal: no nausea, vomiting or diarrhea. No abdominal pain, hematemesis or rectal bleeding.  Genitourinary: no dysuria or polyuria.  Hematologic/lymphatic: no lymph node abnormalities, no easy bruising or easy bleeding.  Musculoskeletal: no muscle or joint pain.  Skin: Multiple decubitus ulcers.  Neurological: no loss of consciousness, no seizure, no headache.  Behavioral/Psych: no depression or suicidal ideation.  Endocrine: no hot flashes.  Immunologic: negative.    ----------------------------------------------------------------------------------------------------------------------      Objective       [facility]  Meds:  ARIPiprazole, 10 mg, Oral, Nightly  atorvastatin, 10 mg, Oral, Nightly  baclofen, 30 mg, Oral, 4x Daily With Meals & Nightly  bumetanide, 2 mg, Oral, Daily  carvedilol, 12.5 mg, Oral, BID With Meals  cefTRIAXone, 2,000 mg, Intravenous, Q24H  dantrolene, 100 mg, Oral, TID  DULoxetine, 60 mg, Oral, BID  ferrous sulfate, 325 mg, Oral, BID  gabapentin, 800 mg, Oral, Q8H  insulin lispro, 2-7 Units, Subcutaneous, 4x Daily AC & at Bedtime  methenamine, 1 g, Oral, BID With Meals  metOLazone, 5 mg, Oral, Once per day on Monday Wednesday Friday  metroNIDAZOLE, 500 mg, Oral, Q8H  modafinil, 200 mg, Oral, Daily  multivitamin with minerals, 1 tablet, Oral, Daily  pantoprazole, 40 mg, Oral, Q AM  potassium chloride, 10 mEq, Oral, Daily  sacubitril-valsartan, 1 tablet, Oral, BID  senna-docusate sodium, 2 tablet, Oral, BID  sodium chloride, 10 mL, Intravenous, Q12H  sodium hypochlorite, , Topical, BID  spironolactone, 25 mg, Oral, Daily  tiZANidine, 2 mg, Oral, TID      sodium chloride, 75 mL/hr, Last Rate: Stopped (01/13/24 0226)      ----------------------------------------------------------------------------------------------------------------------    Vital Signs:  Temp:  [96.8 °F (36 °C)-98.6 °F (37 °C)] 97.8 °F (36.6 °C)  Heart Rate:  [80-94] 94  Resp:  [19-20] 20  BP: ()/(60-72) 99/60  No data found.    SpO2 Percentage    01/13/24 0635 01/13/24 0645 01/13/24 0656   SpO2: 98% 96% 99%     SpO2:  [91 %-99 %] 99 %  on  Flow (L/min):  [2] 2;   Device (Oxygen Therapy): nasal cannula    Body mass index is 41.37 kg/m².  Wt Readings from Last 3 Encounters:   01/09/24 135 kg (296 lb 9.6 oz)   12/27/23 (!) 148 kg (326 lb)   12/14/23 (!) 148 kg (326 lb)        Intake/Output Summary (Last 24 hours) at 1/13/2024 1053  Last data filed at 1/13/2024 0855  Gross per 24 hour   Intake 1760 ml   Output 2975 ml   Net -1215 ml     Diet: Diabetic Diets; Consistent Carbohydrate; Texture: Regular Texture (IDDSI 7); Fluid Consistency:  Thin (IDDSI 0)  ----------------------------------------------------------------------------------------------------------------------      Physical Exam:    Constitutional:  Well-developed and well-nourished.  Resting comfortably in bed, easily arousable.  On same bed.  Denies any complaints or issues.    HENT:  Head: Normocephalic and atraumatic.  Mouth:  Moist mucous membranes.    Eyes:  Conjunctivae and EOM are normal.  No scleral icterus.  Neck:  Neck supple.  No JVD present.    Cardiovascular:  Normal rate, regular rhythm and normal heart sounds with no murmur. No edema.  Pulmonary/Chest: Clear to auscultation bilaterally  Abdominal:  Soft.  Bowel sounds are normal.  No distension and no tenderness.  Ostomy.  Musculoskeletal: Left AKA.  Functional paraplegia.  Neurological:  Alert and oriented to person, place, and time.  No facial droop.  No slurred speech.   Skin:  Skin is warm and dry.  No rash noted.  No pallor.  Sacral decubitus ulcer, left gluteal pressure ulcer with no evidence of active infection. Right lower gluteal ulcer with necrotic tissue present.  Wounds are currently dressed, clean dry and intact   psychiatric:  Normal mood and affect.  Behavior is normal.        ----------------------------------------------------------------------------------------------------------------------          Results from last 7 days   Lab Units 01/13/24  0157   PH, ARTERIAL pH units 7.329*   PO2 ART mm Hg 67.8*   PCO2, ARTERIAL mm Hg 45.4*   HCO3 ART mmol/L 23.9     Results from last 7 days   Lab Units 01/13/24  0220 01/12/24  0115 01/11/24  0146 01/10/24  0248 01/09/24  0541 01/09/24  0312 01/08/24  2322   CRP mg/dL  --   --   --   --   --   --  15.63*   LACTATE mmol/L  --   --   --   --  2.0 2.1* 2.3*   WBC 10*3/mm3 8.31 9.65 9.03   < > 11.62*  --  13.21*   HEMOGLOBIN g/dL 8.0* 7.9* 7.9*   < > 8.3*  --  9.0*   HEMATOCRIT % 29.5* 28.0* 27.9*   < > 30.0*  --  32.5*   MCV fL 84.5 82.4 83.5   < > 83.6  --  83.8  "  MCHC g/dL 27.1* 28.2* 28.3*   < > 27.7*  --  27.7*   PLATELETS 10*3/mm3 422 475* 397   < > 430  --  468*    < > = values in this interval not displayed.     Results from last 7 days   Lab Units 01/13/24  0220 01/12/24  0115 01/11/24  0145 01/10/24  0248 01/09/24  0541 01/08/24  2322   SODIUM mmol/L 141 140 134*   < > 134* 134*   POTASSIUM mmol/L 3.8 4.1 4.6   < > 4.1 4.2   CHLORIDE mmol/L 108* 105 103   < > 100 99   CO2 mmol/L 21.8* 22.2 22.1   < > 23.7 24.7   BUN mg/dL 19 23* 28*   < > 22* 22*   CREATININE mg/dL 0.75* 0.77 0.92   < > 0.98 0.97   CALCIUM mg/dL 8.6 8.7 8.6   < > 8.6 9.2   GLUCOSE mg/dL 129* 131* 124*   < > 126* 113*   ALBUMIN g/dL  --   --   --   --  2.8* 3.4*   BILIRUBIN mg/dL  --   --   --   --  0.3 0.3   ALK PHOS U/L  --   --   --   --  124* 144*   AST (SGOT) U/L  --   --   --   --  11 10   ALT (SGPT) U/L  --   --   --   --  12 12    < > = values in this interval not displayed.   Estimated Creatinine Clearance: 172.7 mL/min (A) (by C-G formula based on SCr of 0.75 mg/dL (L)).  Ammonia   Date Value Ref Range Status   01/13/2024 33 16 - 60 umol/L Final       Glucose   Date/Time Value Ref Range Status   01/13/2024 1042 136 (H) 70 - 130 mg/dL Final   01/13/2024 0631 117 70 - 130 mg/dL Final   01/13/2024 0445 131 (H) 70 - 130 mg/dL Final   01/12/2024 2059 125 70 - 130 mg/dL Final   01/12/2024 1658 106 70 - 130 mg/dL Final   01/12/2024 1024 131 (H) 70 - 130 mg/dL Final   01/12/2024 0618 132 (H) 70 - 130 mg/dL Final   01/11/2024 2211 125 70 - 130 mg/dL Final     Lab Results   Component Value Date    HGBA1C 11.50 (H) 02/13/2023     Lab Results   Component Value Date    TSH 3.780 07/19/2022    FREET4 1.31 06/02/2021       Blood Culture   Date Value Ref Range Status   01/08/2024 No growth at 24 hours  Preliminary   01/08/2024 No growth at 24 hours  Preliminary     No results found for: \"URINECX\"  No results found for: \"WOUNDCX\"  No results found for: \"STOOLCX\"  No results found for: \"RESPCX\"  Pain " Management Panel  More data exists         Latest Ref Rng & Units 1/13/2024 4/9/2023   Pain Management Panel   Creatinine, Urine mg/dL - 79.5    Amphetamine, Urine Qual Negative Negative  -   Barbiturates Screen, Urine Negative Negative  -   Benzodiazepine Screen, Urine Negative Negative  -   Buprenorphine, Screen, Urine Negative Negative  -   Cocaine Screen, Urine Negative Negative  -   Fentanyl, Urine Negative Negative  -   Methadone Screen , Urine Negative Negative  -   Methamphetamine, Ur Negative Negative  -         ----------------------------------------------------------------------------------------------------------------------  Imaging Results (Last 24 Hours)       ** No results found for the last 24 hours. **            ----------------------------------------------------------------------------------------------------------------------    Pertinent Infectious Disease Results        Assessment/Plan       Assessment     Severe sepsis with lactic acid greater than 2 on admission  Acute on chronic osteomyelitis        Plan      The patient is resting comfortably in sand bed, easily arousable.  On 2 L nasal cannula with no apparent distress.  Afebrile.  Denies any excess colostomy output.  Lung exam is clear to auscultation bilaterally.  Abdomen is soft and nontender with normoactive bowel sounds.  WBC is normal at 8.31.  Tissue/bone culture from 1/10 preliminarily reports light growth E. coli and scant growth gram-negative bacilli.    Recommend to continue with metronidazole 500 mg IV every 8 hours and ceftriaxone 2 g IV every 24 hours with an anticipated duration of 4 to 6 weeks, based on CT scan findings indicating evidence of osteomyelitis.  The wound care team continues to follow and planning potential wound VAC placement.  The patient may benefit from referral to LTAC for ongoing IV antibiotic therapy and wound care.  We will continue to monitor closely.    ANTIMICROBIAL THERAPY    cefTRIAXone  (ROCEPHIN) 2000 mg IVPB in 100 mL NS (VTB)  methenamine - 1 g, 1 g  metroNIDAZOLE - 250 MG     Code Status:   Code Status and Medical Interventions:   Ordered at: 01/09/24 0247     Code Status (Patient has no pulse and is not breathing):    CPR (Attempt to Resuscitate)     Medical Interventions (Patient has pulse or is breathing):    Full Support       Jacinta Wade, GUANACO  01/13/24  10:53 EST

## 2024-01-13 NOTE — PLAN OF CARE
Goal Outcome Evaluation:              Outcome Evaluation: Pt resting in bed. Wound care completed per order. 2L NC. Will continue plan of care.     Patient refused bath this shift. Educated on importance.States he will take one later.

## 2024-01-13 NOTE — PROGRESS NOTES
[facility] HOSPITALIST PROGRESS NOTE     Patient Identification:  Name:  Ken Krueger  Age:  48 y.o.  Sex:  male  :  1975  MRN:  [id number]  Visit Number:  [id number]  Primary Care Provider:  Franchesca Hodges APRN    Length of stay:  4    Chief complaint: Confusion    Subjective:    Patient seen and examined at bedside with patient's nurse present.  Patient appears much more lethargic and somewhat confused today.  He is unable to answer orientation questions such as where he is currently located or the current year.   ----------------------------------------------------------------------------------------------------------------------  Current Hospital Meds:  ARIPiprazole, 10 mg, Oral, Nightly  atorvastatin, 10 mg, Oral, Nightly  baclofen, 30 mg, Oral, 4x Daily With Meals & Nightly  bumetanide, 2 mg, Oral, Daily  carvedilol, 12.5 mg, Oral, BID With Meals  cefTRIAXone, 2,000 mg, Intravenous, Q24H  dantrolene, 100 mg, Oral, TID  DULoxetine, 60 mg, Oral, BID  ferrous sulfate, 325 mg, Oral, BID  gabapentin, 800 mg, Oral, Q8H  insulin lispro, 2-7 Units, Subcutaneous, 4x Daily AC & at Bedtime  methenamine, 1 g, Oral, BID With Meals  metOLazone, 5 mg, Oral, Once per day on   metroNIDAZOLE, 500 mg, Oral, Q8H  modafinil, 200 mg, Oral, Daily  multivitamin with minerals, 1 tablet, Oral, Daily  pantoprazole, 40 mg, Oral, Q AM  potassium chloride, 10 mEq, Oral, Daily  sacubitril-valsartan, 1 tablet, Oral, BID  senna-docusate sodium, 2 tablet, Oral, BID  sodium chloride, 10 mL, Intravenous, Q12H  sodium hypochlorite, , Topical, BID  spironolactone, 25 mg, Oral, Daily  tiZANidine, 2 mg, Oral, TID      sodium chloride, 75 mL/hr, Last Rate: Stopped (24)      ----------------------------------------------------------------------------------------------------------------------  Vital Signs:  Temp:  [96.8 °F (36 °C)-98 °F (36.7 °C)] 97.8 °F (36.6 °C)  Heart Rate:  [80-94]  94  Resp:  [19-20] 20  BP: ()/(60-72) 99/60      01/08/24 2026 01/09/24  0443   Weight: (!) 148 kg (326 lb) 135 kg (296 lb 9.6 oz)     Body mass index is 41.37 kg/m².    Intake/Output Summary (Last 24 hours) at 1/13/2024 1445  Last data filed at 1/13/2024 0855  Gross per 24 hour   Intake 1280 ml   Output 2975 ml   Net -1695 ml     Diet: Diabetic Diets; Consistent Carbohydrate; Texture: Regular Texture (IDDSI 7); Fluid Consistency: Thin (IDDSI 0)  ----------------------------------------------------------------------------------------------------------------------  Physical exam:  Constitutional: Morbidly obese by BMI  male in no apparent distress.     HENT:  Head:  Normocephalic and atraumatic.  Mouth:  Moist mucous membranes.    Eyes:  Conjunctivae and EOM are normal.  Pupils are equal, round, and reactive to light.  No scleral icterus.    Neck:  Neck supple. No thyromegaly.  No JVD present.    Cardiovascular:  Regular rate and rhythm with no murmurs, rubs, clicks or gallops appreciated.  Pulmonary/Chest:  Clear to auscultation bilaterally with no crackles, wheezes or rhonchi appreciated.  Abdominal:  Soft. Nontender. Nondistended  Bowel sounds are normal in all four quadrants. No organomegally appreciated.   Musculoskeletal:  No edema, no tenderness, and no deformity.  No red or swollen joints anywhere.    Neurological:  Alert, Cranial nerves II-XII intact with no focal deficits.  No facial droop.  No slurred speech.   Skin:  Warm and dry to palpation with no rashes or lesions appreciated.  Peripheral vascular:  2+ radial and pedal pulses in bilateral upper and lower extremities.  Psychiatric:  Alert but not oriented to person, place or time    ------------------------------------------------------------------------------------------------------------  ----------------------------------------------------------------------------------------------------------------------      Results from last 7 days  "  Lab Units 01/13/24  0220 01/12/24  0115 01/11/24  0146 01/10/24  0248 01/09/24  0541 01/09/24  0312 01/08/24  2322   CRP mg/dL  --   --   --   --   --   --  15.63*   LACTATE mmol/L  --   --   --   --  2.0 2.1* 2.3*   WBC 10*3/mm3 8.31 9.65 9.03   < > 11.62*  --  13.21*   HEMOGLOBIN g/dL 8.0* 7.9* 7.9*   < > 8.3*  --  9.0*   HEMATOCRIT % 29.5* 28.0* 27.9*   < > 30.0*  --  32.5*   MCV fL 84.5 82.4 83.5   < > 83.6  --  83.8   MCHC g/dL 27.1* 28.2* 28.3*   < > 27.7*  --  27.7*   PLATELETS 10*3/mm3 422 475* 397   < > 430  --  468*    < > = values in this interval not displayed.     Results from last 7 days   Lab Units 01/13/24  0157   PH, ARTERIAL pH units 7.329*   PO2 ART mm Hg 67.8*   PCO2, ARTERIAL mm Hg 45.4*   HCO3 ART mmol/L 23.9     Results from last 7 days   Lab Units 01/13/24 0220 01/12/24  0115 01/11/24  0145 01/10/24  0248 01/09/24  0541 01/08/24  2322   SODIUM mmol/L 141 140 134*   < > 134* 134*   POTASSIUM mmol/L 3.8 4.1 4.6   < > 4.1 4.2   CHLORIDE mmol/L 108* 105 103   < > 100 99   CO2 mmol/L 21.8* 22.2 22.1   < > 23.7 24.7   BUN mg/dL 19 23* 28*   < > 22* 22*   CREATININE mg/dL 0.75* 0.77 0.92   < > 0.98 0.97   CALCIUM mg/dL 8.6 8.7 8.6   < > 8.6 9.2   GLUCOSE mg/dL 129* 131* 124*   < > 126* 113*   ALBUMIN g/dL  --   --   --   --  2.8* 3.4*   BILIRUBIN mg/dL  --   --   --   --  0.3 0.3   ALK PHOS U/L  --   --   --   --  124* 144*   AST (SGOT) U/L  --   --   --   --  11 10   ALT (SGPT) U/L  --   --   --   --  12 12    < > = values in this interval not displayed.   Estimated Creatinine Clearance: 172.7 mL/min (A) (by C-G formula based on SCr of 0.75 mg/dL (L)).    Ammonia   Date Value Ref Range Status   01/13/2024 33 16 - 60 umol/L Final         Blood Culture   Date Value Ref Range Status   01/08/2024 No growth at 24 hours  Preliminary   01/08/2024 No growth at 24 hours  Preliminary     No results found for: \"URINECX\"  No results found for: \"WOUNDCX\"  No results found for: \"STOOLCX\"    I have personally " looked at the labs and they are summarized above.  ----------------------------------------------------------------------------------------------------------------------  Imaging Results (Last 24 Hours)       ** No results found for the last 24 hours. **          ----------------------------------------------------------------------------------------------------------------------  Assessment and Plan:    Sacral decubitus ulcer - patient has had bedside debridement performed during this hospitalization..  Unfortunately, patient is not an appropriate candidate for wound VAC at this time.  Will continue wound care per wound care nurse practitioner recommendations.  LTAC consult currently pending.      2.  Acute on chronic osteomyelitis - continue  Rocephin and Flagyl per ID recommendations.  Will likely require IV antibiotic therapy for 4 to 6 weeks.  Currently awaiting PICC line placement    3.  Severe sepsis -present on admission, secondary to chronic sacral decubitus ulcer    4.  Chronic HFpEF/HFrEF -no evidence of exacerbation at this time    5.  Essential hypertension -well-controlled    6.  Type 2 diabetes mellitus -continue Accu-Cheks before every meal and nightly with sliding scale insulin    7.  Morbid obesity -complicates all aspects of care    8.  Mild confusion -etiology unclear, will obtain repeat ABG and if within normal limits, will likely investigate infectious etiologies.    Disposition will likely require several more days hospitalization    Harlan Fernández DO   01/13/24   14:45 EST

## 2024-01-14 LAB
BACTERIA SPEC AEROBE CULT: ABNORMAL
BACTERIA UR QL AUTO: ABNORMAL /HPF
BILIRUB UR QL STRIP: NEGATIVE
CLARITY UR: CLEAR
COLOR UR: ABNORMAL
GLUCOSE BLDC GLUCOMTR-MCNC: 109 MG/DL (ref 70–130)
GLUCOSE BLDC GLUCOMTR-MCNC: 113 MG/DL (ref 70–130)
GLUCOSE BLDC GLUCOMTR-MCNC: 114 MG/DL (ref 70–130)
GLUCOSE BLDC GLUCOMTR-MCNC: 133 MG/DL (ref 70–130)
GLUCOSE UR STRIP-MCNC: NEGATIVE MG/DL
GRAM STN SPEC: ABNORMAL
HGB UR QL STRIP.AUTO: NEGATIVE
HYALINE CASTS UR QL AUTO: ABNORMAL /LPF
KETONES UR QL STRIP: NEGATIVE
LEUKOCYTE ESTERASE UR QL STRIP.AUTO: NEGATIVE
NITRITE UR QL STRIP: NEGATIVE
PH UR STRIP.AUTO: 6.5 [PH] (ref 5–8)
PROT UR QL STRIP: ABNORMAL
RBC # UR STRIP: ABNORMAL /HPF
REF LAB TEST METHOD: ABNORMAL
SP GR UR STRIP: 1.01 (ref 1–1.03)
SQUAMOUS #/AREA URNS HPF: ABNORMAL /HPF
UROBILINOGEN UR QL STRIP: ABNORMAL
WBC # UR STRIP: ABNORMAL /HPF

## 2024-01-14 PROCEDURE — 94761 N-INVAS EAR/PLS OXIMETRY MLT: CPT

## 2024-01-14 PROCEDURE — 82948 REAGENT STRIP/BLOOD GLUCOSE: CPT

## 2024-01-14 PROCEDURE — 25010000002 CEFTRIAXONE PER 250 MG: Performed by: NURSE PRACTITIONER

## 2024-01-14 PROCEDURE — 94799 UNLISTED PULMONARY SVC/PX: CPT

## 2024-01-14 PROCEDURE — 99232 SBSQ HOSP IP/OBS MODERATE 35: CPT | Performed by: NURSE PRACTITIONER

## 2024-01-14 PROCEDURE — 81001 URINALYSIS AUTO W/SCOPE: CPT | Performed by: INTERNAL MEDICINE

## 2024-01-14 PROCEDURE — 94664 DEMO&/EVAL PT USE INHALER: CPT

## 2024-01-14 PROCEDURE — 87086 URINE CULTURE/COLONY COUNT: CPT | Performed by: INTERNAL MEDICINE

## 2024-01-14 PROCEDURE — 99232 SBSQ HOSP IP/OBS MODERATE 35: CPT | Performed by: INTERNAL MEDICINE

## 2024-01-14 RX ADMIN — BUMETANIDE 2 MG: 1 TABLET ORAL at 08:55

## 2024-01-14 RX ADMIN — GABAPENTIN 800 MG: 400 CAPSULE ORAL at 06:01

## 2024-01-14 RX ADMIN — DAKIN'S SOLUTION 0.125% (QUARTER STRENGTH): 0.12 SOLUTION at 22:28

## 2024-01-14 RX ADMIN — Medication 10 ML: at 22:28

## 2024-01-14 RX ADMIN — ARIPIPRAZOLE 10 MG: 10 TABLET ORAL at 22:27

## 2024-01-14 RX ADMIN — SODIUM CHLORIDE 2000 MG: 9 INJECTION, SOLUTION INTRAVENOUS at 11:33

## 2024-01-14 RX ADMIN — DOCUSATE SODIUM 50 MG AND SENNOSIDES 8.6 MG 2 TABLET: 8.6; 5 TABLET, FILM COATED ORAL at 08:55

## 2024-01-14 RX ADMIN — IPRATROPIUM BROMIDE AND ALBUTEROL SULFATE 3 ML: 2.5; .5 SOLUTION RESPIRATORY (INHALATION) at 04:25

## 2024-01-14 RX ADMIN — BACLOFEN 30 MG: 10 TABLET ORAL at 17:13

## 2024-01-14 RX ADMIN — FERROUS SULFATE TAB 325 MG (65 MG ELEMENTAL FE) 325 MG: 325 (65 FE) TAB at 08:55

## 2024-01-14 RX ADMIN — BACLOFEN 30 MG: 10 TABLET ORAL at 22:25

## 2024-01-14 RX ADMIN — METRONIDAZOLE 500 MG: 250 TABLET ORAL at 05:59

## 2024-01-14 RX ADMIN — ATORVASTATIN CALCIUM 10 MG: 10 TABLET, FILM COATED ORAL at 22:25

## 2024-01-14 RX ADMIN — DULOXETINE HYDROCHLORIDE 60 MG: 60 CAPSULE, DELAYED RELEASE ORAL at 22:27

## 2024-01-14 RX ADMIN — METHENAMINE HIPPURATE 1 G: 1000 TABLET ORAL at 17:13

## 2024-01-14 RX ADMIN — DANTROLENE SODIUM 100 MG: 25 CAPSULE ORAL at 16:17

## 2024-01-14 RX ADMIN — FERROUS SULFATE TAB 325 MG (65 MG ELEMENTAL FE) 325 MG: 325 (65 FE) TAB at 22:27

## 2024-01-14 RX ADMIN — DULOXETINE HYDROCHLORIDE 60 MG: 60 CAPSULE, DELAYED RELEASE ORAL at 08:56

## 2024-01-14 RX ADMIN — PANTOPRAZOLE SODIUM 40 MG: 40 TABLET, DELAYED RELEASE ORAL at 06:01

## 2024-01-14 RX ADMIN — METRONIDAZOLE 500 MG: 250 TABLET ORAL at 17:13

## 2024-01-14 RX ADMIN — TIZANIDINE 2 MG: 4 TABLET ORAL at 14:03

## 2024-01-14 RX ADMIN — BACLOFEN 30 MG: 10 TABLET ORAL at 08:56

## 2024-01-14 RX ADMIN — TIZANIDINE 2 MG: 4 TABLET ORAL at 22:25

## 2024-01-14 RX ADMIN — POTASSIUM CHLORIDE 10 MEQ: 20 TABLET, EXTENDED RELEASE ORAL at 08:56

## 2024-01-14 RX ADMIN — METHENAMINE HIPPURATE 1 G: 1000 TABLET ORAL at 08:56

## 2024-01-14 RX ADMIN — GABAPENTIN 800 MG: 400 CAPSULE ORAL at 22:25

## 2024-01-14 RX ADMIN — TIZANIDINE 2 MG: 4 TABLET ORAL at 08:55

## 2024-01-14 RX ADMIN — SACUBITRIL AND VALSARTAN 1 TABLET: 24; 26 TABLET, FILM COATED ORAL at 08:55

## 2024-01-14 RX ADMIN — CARVEDILOL 12.5 MG: 6.25 TABLET, FILM COATED ORAL at 08:56

## 2024-01-14 RX ADMIN — DANTROLENE SODIUM 100 MG: 25 CAPSULE ORAL at 22:27

## 2024-01-14 RX ADMIN — DOCUSATE SODIUM 50 MG AND SENNOSIDES 8.6 MG 2 TABLET: 8.6; 5 TABLET, FILM COATED ORAL at 22:26

## 2024-01-14 RX ADMIN — IPRATROPIUM BROMIDE AND ALBUTEROL SULFATE 3 ML: 2.5; .5 SOLUTION RESPIRATORY (INHALATION) at 06:45

## 2024-01-14 RX ADMIN — GABAPENTIN 800 MG: 400 CAPSULE ORAL at 14:03

## 2024-01-14 RX ADMIN — BACLOFEN 30 MG: 10 TABLET ORAL at 11:33

## 2024-01-14 RX ADMIN — Medication 1 TABLET: at 08:55

## 2024-01-14 RX ADMIN — MODAFINIL 200 MG: 100 TABLET ORAL at 08:55

## 2024-01-14 RX ADMIN — SPIRONOLACTONE 25 MG: 25 TABLET, FILM COATED ORAL at 08:56

## 2024-01-14 RX ADMIN — METRONIDAZOLE 500 MG: 250 TABLET ORAL at 22:25

## 2024-01-14 RX ADMIN — IPRATROPIUM BROMIDE AND ALBUTEROL SULFATE 3 ML: 2.5; .5 SOLUTION RESPIRATORY (INHALATION) at 18:37

## 2024-01-14 RX ADMIN — DAKIN'S SOLUTION 0.125% (QUARTER STRENGTH): 0.12 SOLUTION at 08:57

## 2024-01-14 RX ADMIN — DANTROLENE SODIUM 100 MG: 25 CAPSULE ORAL at 08:55

## 2024-01-14 RX ADMIN — Medication 10 ML: at 08:57

## 2024-01-14 NOTE — PROGRESS NOTES
[facility] HOSPITALIST PROGRESS NOTE     Patient Identification:  Name:  Ken Krueger  Age:  48 y.o.  Sex:  male  :  1975  MRN:  [id number]  Visit Number:  [id number]  Primary Care Provider:  Franchesca Hodges APRN    Length of stay:  5    Chief complaint: None    Subjective:    Patient seen and examined at bedside with no nursing staff present.  Manuelitoe is sitting up in bed and appears back to his baseline mentation.  He denies any fevers, chills, sweats or rigors.  Denies any new events overnight.  Patient is requesting a different type of bed as he thinks he has slumping down in the bed when trying to sleep which makes it hard for him to breathe.  ----------------------------------------------------------------------------------------------------------------------  Current Hospital Meds:  ARIPiprazole, 10 mg, Oral, Nightly  atorvastatin, 10 mg, Oral, Nightly  baclofen, 30 mg, Oral, 4x Daily With Meals & Nightly  bumetanide, 2 mg, Oral, Daily  carvedilol, 12.5 mg, Oral, BID With Meals  cefTRIAXone, 2,000 mg, Intravenous, Q24H  dantrolene, 100 mg, Oral, TID  DULoxetine, 60 mg, Oral, BID  ferrous sulfate, 325 mg, Oral, BID  gabapentin, 800 mg, Oral, Q8H  insulin lispro, 2-7 Units, Subcutaneous, 4x Daily AC & at Bedtime  methenamine, 1 g, Oral, BID With Meals  metOLazone, 5 mg, Oral, Once per day on   metroNIDAZOLE, 500 mg, Oral, Q8H  modafinil, 200 mg, Oral, Daily  multivitamin with minerals, 1 tablet, Oral, Daily  pantoprazole, 40 mg, Oral, Q AM  potassium chloride, 10 mEq, Oral, Daily  sacubitril-valsartan, 1 tablet, Oral, BID  senna-docusate sodium, 2 tablet, Oral, BID  sodium chloride, 10 mL, Intravenous, Q12H  sodium hypochlorite, , Topical, BID  spironolactone, 25 mg, Oral, Daily  tiZANidine, 2 mg, Oral, TID      sodium chloride, 75 mL/hr, Last Rate: Stopped (24  0226)      ----------------------------------------------------------------------------------------------------------------------  Vital Signs:  Temp:  [97 °F (36.1 °C)-99 °F (37.2 °C)] 99 °F (37.2 °C)  Heart Rate:  [70-89] 84  Resp:  [18-20] 20  BP: ()/(63-81) 99/64      01/08/24 2026 01/09/24  0443   Weight: (!) 148 kg (326 lb) 135 kg (296 lb 9.6 oz)     Body mass index is 41.37 kg/m².    Intake/Output Summary (Last 24 hours) at 1/14/2024 1336  Last data filed at 1/14/2024 1200  Gross per 24 hour   Intake 3167 ml   Output 2200 ml   Net 967 ml     Diet: Diabetic Diets; Consistent Carbohydrate; Texture: Regular Texture (IDDSI 7); Fluid Consistency: Thin (IDDSI 0)  ----------------------------------------------------------------------------------------------------------------------  Physical exam:  Constitutional: Morbidly obese by BMI  male in no apparent distress.     HENT:  Head:  Normocephalic and atraumatic.  Mouth:  Moist mucous membranes.    Eyes:  Conjunctivae and EOM are normal.  Pupils are equal, round, and reactive to light.  No scleral icterus.    Neck:  Neck supple. No thyromegaly.  No JVD present.    Cardiovascular:  Regular rate and rhythm with no murmurs, rubs, clicks or gallops appreciated.  Pulmonary/Chest:  Clear to auscultation bilaterally with no crackles, wheezes or rhonchi appreciated.  Abdominal:  Soft. Nontender. Nondistended  Bowel sounds are normal in all four quadrants. No organomegally appreciated.   Musculoskeletal:  No edema, no tenderness, and no deformity.  No red or swollen joints anywhere.    Neurological:  Alert, Cranial nerves II-XII intact with no focal deficits.  No facial droop.  No slurred speech.   Skin:  Warm and dry to palpation with no rashes or lesions appreciated.  Peripheral vascular:  2+ radial and pedal pulses in bilateral upper and lower extremities.  Psychiatric:  Alert and oriented to person, place and  time    ------------------------------------------------------------------------------------------------------------  ----------------------------------------------------------------------------------------------------------------------      Results from last 7 days   Lab Units 01/13/24  0220 01/12/24  0115 01/11/24  0146 01/10/24  0248 01/09/24  0541 01/09/24  0312 01/08/24  2322   CRP mg/dL  --   --   --   --   --   --  15.63*   LACTATE mmol/L  --   --   --   --  2.0 2.1* 2.3*   WBC 10*3/mm3 8.31 9.65 9.03   < > 11.62*  --  13.21*   HEMOGLOBIN g/dL 8.0* 7.9* 7.9*   < > 8.3*  --  9.0*   HEMATOCRIT % 29.5* 28.0* 27.9*   < > 30.0*  --  32.5*   MCV fL 84.5 82.4 83.5   < > 83.6  --  83.8   MCHC g/dL 27.1* 28.2* 28.3*   < > 27.7*  --  27.7*   PLATELETS 10*3/mm3 422 475* 397   < > 430  --  468*    < > = values in this interval not displayed.     Results from last 7 days   Lab Units 01/13/24  1542   PH, ARTERIAL pH units 7.354   PO2 ART mm Hg 60.0*   PCO2, ARTERIAL mm Hg 45.8*   HCO3 ART mmol/L 25.5     Results from last 7 days   Lab Units 01/13/24  0220 01/12/24  0115 01/11/24  0145 01/10/24  0248 01/09/24  0541 01/08/24  2322   SODIUM mmol/L 141 140 134*   < > 134* 134*   POTASSIUM mmol/L 3.8 4.1 4.6   < > 4.1 4.2   CHLORIDE mmol/L 108* 105 103   < > 100 99   CO2 mmol/L 21.8* 22.2 22.1   < > 23.7 24.7   BUN mg/dL 19 23* 28*   < > 22* 22*   CREATININE mg/dL 0.75* 0.77 0.92   < > 0.98 0.97   CALCIUM mg/dL 8.6 8.7 8.6   < > 8.6 9.2   GLUCOSE mg/dL 129* 131* 124*   < > 126* 113*   ALBUMIN g/dL  --   --   --   --  2.8* 3.4*   BILIRUBIN mg/dL  --   --   --   --  0.3 0.3   ALK PHOS U/L  --   --   --   --  124* 144*   AST (SGOT) U/L  --   --   --   --  11 10   ALT (SGPT) U/L  --   --   --   --  12 12    < > = values in this interval not displayed.   Estimated Creatinine Clearance: 172.7 mL/min (A) (by C-G formula based on SCr of 0.75 mg/dL (L)).    Ammonia   Date Value Ref Range Status   01/13/2024 33 16 - 60 umol/L Final        "  Blood Culture   Date Value Ref Range Status   01/08/2024 No growth at 24 hours  Preliminary   01/08/2024 No growth at 24 hours  Preliminary     No results found for: \"URINECX\"  No results found for: \"WOUNDCX\"  No results found for: \"STOOLCX\"    I have personally looked at the labs and they are summarized above.  ----------------------------------------------------------------------------------------------------------------------  Imaging Results (Last 24 Hours)       ** No results found for the last 24 hours. **          ----------------------------------------------------------------------------------------------------------------------  Assessment and Plan:    Sacral decubitus ulcer - patient has had bedside debridement performed during this hospitalization.  Unfortunately, patient is not an appropriate candidate for wound VAC at this time.  Will continue wound care per wound care nurse practitioner recommendations.  LTAC consult currently pending.  Wound culture demonstrates E. coli and Proteus, will continue Flagyl and Rocephin per ID recommendations.    2.  Acute on chronic osteomyelitis - continue Rocephin and Flagyl per ID recommendations.  Will likely require IV antibiotic therapy for 4 to 6 weeks.  Currently awaiting PICC line placement    3.  Severe sepsis -present on admission, secondary to chronic sacral decubitus ulcer    4.  Chronic HFpEF/HFrEF -no evidence of exacerbation at this time    5.  Essential hypertension -well-controlled    6.  Type 2 diabetes mellitus -continue Accu-Cheks before every meal and nightly with sliding scale insulin    7.  Morbid obesity -complicates all aspects of care    8.  Mild confusion - resolved, was likely secondary to mild hospital associated delirium    Disposition currently pending LTAC placement    Harlan Fernández DO   01/14/24   13:36 EST     "

## 2024-01-14 NOTE — PLAN OF CARE
Goal Outcome Evaluation:            PT has rested in bed this shift, wound care completed per order. Home CPAP applied at night. PT remains lethargic this shift and disoriented to time this shift. VSS, will continue POC

## 2024-01-14 NOTE — PLAN OF CARE
Goal Outcome Evaluation:  Pt resting in bed. Wound care completed per orders. No acute changes this shift. VSS. Will continue plan of care.

## 2024-01-14 NOTE — PROGRESS NOTES
PROGRESS NOTE         Patient Identification:  Name:  Ken Krueger  Age:  48 y.o.  Sex:  male  :  1975  MRN:  [id number]  Visit Number:  [id number]  Primary Care Provider:  Franchesca Hodges APRN         LOS: 5 days       ----------------------------------------------------------------------------------------------------------------------  Subjective       Chief Complaints:    Wound Check (Pt states his primary sent him her for his bed sore on his buttock. )        Interval History:      The patient is awake and alert, complains of some orthopnea due to positioning this in bed.  On 2 L nasal cannula with no apparent distress.  24-hour Tmax 9 9 °F.  Denies any excessive colostomy output.  Lung exam is clear to auscultation bilaterally.  Abdomen is rounded with normoactive bowel sounds.  Urinalysis overnight revealed 6-10 WBCs and 1+ bacteria with culture pending.  Blood cultures are pending x 2.  Tissue/bone culture from 1/10 is finalized with light growth E. coli and scant growth Proteus mirabilis.      Review of Systems:    Constitutional: no fever, no chills. No night sweats. No appetite change or unexpected weight change. No fatigue.  Eyes: no eye drainage, itching or redness.  HEENT: no mouth sores, dysphagia or nose bleed.  Respiratory: no for shortness of breath, cough or production of sputum. orthopnea  Cardiovascular: no chest pain, no palpitations, no orthopnea.  Gastrointestinal: no nausea, vomiting or diarrhea. No abdominal pain, hematemesis or rectal bleeding.  Genitourinary: no dysuria or polyuria.  Hematologic/lymphatic: no lymph node abnormalities, no easy bruising or easy bleeding.  Musculoskeletal: no muscle or joint pain.  Skin: Multiple decubitus ulcers.  Neurological: no loss of consciousness, no seizure, no headache.  Behavioral/Psych: no depression or suicidal ideation.  Endocrine: no hot flashes.  Immunologic:  negative.    ----------------------------------------------------------------------------------------------------------------------      Objective       [facility] Meds:  ARIPiprazole, 10 mg, Oral, Nightly  atorvastatin, 10 mg, Oral, Nightly  baclofen, 30 mg, Oral, 4x Daily With Meals & Nightly  bumetanide, 2 mg, Oral, Daily  carvedilol, 12.5 mg, Oral, BID With Meals  cefTRIAXone, 2,000 mg, Intravenous, Q24H  dantrolene, 100 mg, Oral, TID  DULoxetine, 60 mg, Oral, BID  ferrous sulfate, 325 mg, Oral, BID  gabapentin, 800 mg, Oral, Q8H  insulin lispro, 2-7 Units, Subcutaneous, 4x Daily AC & at Bedtime  methenamine, 1 g, Oral, BID With Meals  metOLazone, 5 mg, Oral, Once per day on Monday Wednesday Friday  metroNIDAZOLE, 500 mg, Oral, Q8H  modafinil, 200 mg, Oral, Daily  multivitamin with minerals, 1 tablet, Oral, Daily  pantoprazole, 40 mg, Oral, Q AM  potassium chloride, 10 mEq, Oral, Daily  sacubitril-valsartan, 1 tablet, Oral, BID  senna-docusate sodium, 2 tablet, Oral, BID  sodium chloride, 10 mL, Intravenous, Q12H  sodium hypochlorite, , Topical, BID  spironolactone, 25 mg, Oral, Daily  tiZANidine, 2 mg, Oral, TID      sodium chloride, 75 mL/hr, Last Rate: Stopped (01/13/24 0226)      ----------------------------------------------------------------------------------------------------------------------    Vital Signs:  Temp:  [97 °F (36.1 °C)-99 °F (37.2 °C)] 99 °F (37.2 °C)  Heart Rate:  [70-89] 84  Resp:  [18-20] 20  BP: ()/(63-81) 99/64  No data found.    SpO2 Percentage    01/14/24 0425 01/14/24 0645 01/14/24 0700   SpO2: 94% 91% 92%     SpO2:  [91 %-97 %] 92 %  on  Flow (L/min):  [2] 2;   Device (Oxygen Therapy): nasal cannula    Body mass index is 41.37 kg/m².  Wt Readings from Last 3 Encounters:   01/09/24 135 kg (296 lb 9.6 oz)   12/27/23 (!) 148 kg (326 lb)   12/14/23 (!) 148 kg (326 lb)        Intake/Output Summary (Last 24 hours) at 1/14/2024 1040  Last data filed at 1/14/2024 0900  Gross  per 24 hour   Intake 2707 ml   Output 2200 ml   Net 507 ml     Diet: Diabetic Diets; Consistent Carbohydrate; Texture: Regular Texture (IDDSI 7); Fluid Consistency: Thin (IDDSI 0)  ----------------------------------------------------------------------------------------------------------------------      Physical Exam:    Constitutional:  Well-developed and well-nourished.  Resting in bed on 2 L nasal cannula with no apparent respiratory distress.  Complains of orthopnea due to positioning in sand bed.  HENT:  Head: Normocephalic and atraumatic.  Mouth:  Moist mucous membranes.    Eyes:  Conjunctivae and EOM are normal.  No scleral icterus.  Neck:  Neck supple.  No JVD present.    Cardiovascular:  Normal rate, regular rhythm and normal heart sounds with no murmur. No edema.  Pulmonary/Chest: Clear bilaterally to auscultation  Abdominal:  Soft.  Bowel sounds are normal.  No distension and no tenderness.  Ostomy.  Musculoskeletal: Left AKA.  Functional paraplegia.  Neurological:  Alert and oriented to person, place, and time.  No facial droop.  No slurred speech.   Skin:  Skin is warm and dry.  No rash noted.  No pallor.  Sacral decubitus ulcer, left gluteal pressure ulcer with no evidence of active infection. Right lower gluteal ulcer with necrotic tissue present.  Wounds are currently dressed, clean dry and intact   psychiatric:  Normal mood and affect.  Behavior is normal.        ----------------------------------------------------------------------------------------------------------------------          Results from last 7 days   Lab Units 01/13/24  1542   PH, ARTERIAL pH units 7.354   PO2 ART mm Hg 60.0*   PCO2, ARTERIAL mm Hg 45.8*   HCO3 ART mmol/L 25.5     Results from last 7 days   Lab Units 01/13/24  0220 01/12/24  0115 01/11/24  0146 01/10/24  0248 01/09/24  0541 01/09/24  0312 01/08/24  2322   CRP mg/dL  --   --   --   --   --   --  15.63*   LACTATE mmol/L  --   --   --   --  2.0 2.1* 2.3*   WBC 10*3/mm3  8.31 9.65 9.03   < > 11.62*  --  13.21*   HEMOGLOBIN g/dL 8.0* 7.9* 7.9*   < > 8.3*  --  9.0*   HEMATOCRIT % 29.5* 28.0* 27.9*   < > 30.0*  --  32.5*   MCV fL 84.5 82.4 83.5   < > 83.6  --  83.8   MCHC g/dL 27.1* 28.2* 28.3*   < > 27.7*  --  27.7*   PLATELETS 10*3/mm3 422 475* 397   < > 430  --  468*    < > = values in this interval not displayed.     Results from last 7 days   Lab Units 01/13/24  0220 01/12/24  0115 01/11/24  0145 01/10/24  0248 01/09/24  0541 01/08/24  2322   SODIUM mmol/L 141 140 134*   < > 134* 134*   POTASSIUM mmol/L 3.8 4.1 4.6   < > 4.1 4.2   CHLORIDE mmol/L 108* 105 103   < > 100 99   CO2 mmol/L 21.8* 22.2 22.1   < > 23.7 24.7   BUN mg/dL 19 23* 28*   < > 22* 22*   CREATININE mg/dL 0.75* 0.77 0.92   < > 0.98 0.97   CALCIUM mg/dL 8.6 8.7 8.6   < > 8.6 9.2   GLUCOSE mg/dL 129* 131* 124*   < > 126* 113*   ALBUMIN g/dL  --   --   --   --  2.8* 3.4*   BILIRUBIN mg/dL  --   --   --   --  0.3 0.3   ALK PHOS U/L  --   --   --   --  124* 144*   AST (SGOT) U/L  --   --   --   --  11 10   ALT (SGPT) U/L  --   --   --   --  12 12    < > = values in this interval not displayed.   Estimated Creatinine Clearance: 172.7 mL/min (A) (by C-G formula based on SCr of 0.75 mg/dL (L)).  Ammonia   Date Value Ref Range Status   01/13/2024 33 16 - 60 umol/L Final       Glucose   Date/Time Value Ref Range Status   01/14/2024 1032 133 (H) 70 - 130 mg/dL Final   01/14/2024 0620 109 70 - 130 mg/dL Final   01/13/2024 2105 108 70 - 130 mg/dL Final   01/13/2024 1626 118 70 - 130 mg/dL Final   01/13/2024 1042 136 (H) 70 - 130 mg/dL Final   01/13/2024 0631 117 70 - 130 mg/dL Final   01/13/2024 0445 131 (H) 70 - 130 mg/dL Final   01/12/2024 2059 125 70 - 130 mg/dL Final     Lab Results   Component Value Date    HGBA1C 11.50 (H) 02/13/2023     Lab Results   Component Value Date    TSH 3.780 07/19/2022    FREET4 1.31 06/02/2021       Blood Culture   Date Value Ref Range Status   01/08/2024 No growth at 24 hours  Preliminary  "  01/08/2024 No growth at 24 hours  Preliminary     No results found for: \"URINECX\"  No results found for: \"WOUNDCX\"  No results found for: \"STOOLCX\"  No results found for: \"RESPCX\"  Pain Management Panel  More data exists         Latest Ref Rng & Units 1/13/2024 4/9/2023   Pain Management Panel   Creatinine, Urine mg/dL - 79.5    Amphetamine, Urine Qual Negative Negative  -   Barbiturates Screen, Urine Negative Negative  -   Benzodiazepine Screen, Urine Negative Negative  -   Buprenorphine, Screen, Urine Negative Negative  -   Cocaine Screen, Urine Negative Negative  -   Fentanyl, Urine Negative Negative  -   Methadone Screen , Urine Negative Negative  -   Methamphetamine, Ur Negative Negative  -         ----------------------------------------------------------------------------------------------------------------------  Imaging Results (Last 24 Hours)       ** No results found for the last 24 hours. **            ----------------------------------------------------------------------------------------------------------------------    Pertinent Infectious Disease Results        Assessment/Plan       Assessment     Severe sepsis with lactic acid greater than 2 on admission  Acute on chronic osteomyelitis        Plan      The patient is awake and alert, complains of some orthopnea due to positioning this in bed.  On 2 L nasal cannula with no apparent distress.  24-hour Tmax 9 9 °F.  Denies any excessive colostomy output.  Lung exam is clear to auscultation bilaterally.  Abdomen is rounded with normoactive bowel sounds.  Urinalysis overnight revealed 6-10 WBCs and 1+ bacteria with culture pending.  Blood cultures are pending x 2.  Tissue/bone culture from 1/10 is finalized with light growth E. coli and scant growth Proteus mirabilis.    E. coli and Proteus growth on tissue culture are susceptible to ceftriaxone.  We will continue with metronidazole 500 mg IV every 8 hours and ceftriaxone 2 g IV every 24 hours with " anticipated duration of 4 to 6 weeks for sacral osteomyelitis.  The patient reported the wound care team continues to follow and planning for potential wound VAC placement soon.  May benefit from LTAC referral for IV antibiotic therapy and wound care.  We will continue to monitor.      ANTIMICROBIAL THERAPY    cefTRIAXone (ROCEPHIN) 2000 mg IVPB in 100 mL NS (VTB)  methenamine - 1 g, 1 g  metroNIDAZOLE - 250 MG     Code Status:   Code Status and Medical Interventions:   Ordered at: 01/09/24 0247     Code Status (Patient has no pulse and is not breathing):    CPR (Attempt to Resuscitate)     Medical Interventions (Patient has pulse or is breathing):    Full Support       Jacinta Wade, APRN  01/14/24  10:40 EST

## 2024-01-15 ENCOUNTER — APPOINTMENT (OUTPATIENT)
Dept: GENERAL RADIOLOGY | Facility: HOSPITAL | Age: 49
End: 2024-01-15
Payer: MEDICARE

## 2024-01-15 LAB
ANION GAP SERPL CALCULATED.3IONS-SCNC: 11.9 MMOL/L (ref 5–15)
BUN SERPL-MCNC: 22 MG/DL (ref 6–20)
BUN/CREAT SERPL: 30.6 (ref 7–25)
CALCIUM SPEC-SCNC: 9 MG/DL (ref 8.6–10.5)
CHLORIDE SERPL-SCNC: 106 MMOL/L (ref 98–107)
CO2 SERPL-SCNC: 22.1 MMOL/L (ref 22–29)
CREAT SERPL-MCNC: 0.72 MG/DL (ref 0.76–1.27)
DEPRECATED RDW RBC AUTO: 56.2 FL (ref 37–54)
EGFRCR SERPLBLD CKD-EPI 2021: 114.1 ML/MIN/1.73
ERYTHROCYTE [DISTWIDTH] IN BLOOD BY AUTOMATED COUNT: 18 % (ref 12.3–15.4)
GLUCOSE BLDC GLUCOMTR-MCNC: 114 MG/DL (ref 70–130)
GLUCOSE BLDC GLUCOMTR-MCNC: 117 MG/DL (ref 70–130)
GLUCOSE BLDC GLUCOMTR-MCNC: 126 MG/DL (ref 70–130)
GLUCOSE BLDC GLUCOMTR-MCNC: 135 MG/DL (ref 70–130)
GLUCOSE SERPL-MCNC: 110 MG/DL (ref 65–99)
HCT VFR BLD AUTO: 32.3 % (ref 37.5–51)
HGB BLD-MCNC: 8.7 G/DL (ref 13–17.7)
MCH RBC QN AUTO: 23.4 PG (ref 26.6–33)
MCHC RBC AUTO-ENTMCNC: 26.9 G/DL (ref 31.5–35.7)
MCV RBC AUTO: 86.8 FL (ref 79–97)
PLATELET # BLD AUTO: 392 10*3/MM3 (ref 140–450)
PMV BLD AUTO: 10.2 FL (ref 6–12)
POTASSIUM SERPL-SCNC: 3.9 MMOL/L (ref 3.5–5.2)
RBC # BLD AUTO: 3.72 10*6/MM3 (ref 4.14–5.8)
SODIUM SERPL-SCNC: 140 MMOL/L (ref 136–145)
WBC NRBC COR # BLD AUTO: 10.11 10*3/MM3 (ref 3.4–10.8)

## 2024-01-15 PROCEDURE — 71045 X-RAY EXAM CHEST 1 VIEW: CPT

## 2024-01-15 PROCEDURE — 99233 SBSQ HOSP IP/OBS HIGH 50: CPT | Performed by: INTERNAL MEDICINE

## 2024-01-15 PROCEDURE — 80048 BASIC METABOLIC PNL TOTAL CA: CPT | Performed by: INTERNAL MEDICINE

## 2024-01-15 PROCEDURE — 94664 DEMO&/EVAL PT USE INHALER: CPT

## 2024-01-15 PROCEDURE — 85027 COMPLETE CBC AUTOMATED: CPT | Performed by: INTERNAL MEDICINE

## 2024-01-15 PROCEDURE — 82948 REAGENT STRIP/BLOOD GLUCOSE: CPT

## 2024-01-15 PROCEDURE — 94799 UNLISTED PULMONARY SVC/PX: CPT

## 2024-01-15 PROCEDURE — 99232 SBSQ HOSP IP/OBS MODERATE 35: CPT | Performed by: INTERNAL MEDICINE

## 2024-01-15 PROCEDURE — 25010000002 HEPARIN (PORCINE) PER 1000 UNITS: Performed by: INTERNAL MEDICINE

## 2024-01-15 PROCEDURE — 94761 N-INVAS EAR/PLS OXIMETRY MLT: CPT

## 2024-01-15 PROCEDURE — 71045 X-RAY EXAM CHEST 1 VIEW: CPT | Performed by: RADIOLOGY

## 2024-01-15 PROCEDURE — 25010000002 PIPERACILLIN SOD-TAZOBACTAM PER 1 G: Performed by: INTERNAL MEDICINE

## 2024-01-15 RX ORDER — HEPARIN SODIUM 5000 [USP'U]/ML
5000 INJECTION, SOLUTION INTRAVENOUS; SUBCUTANEOUS EVERY 8 HOURS SCHEDULED
Status: DISCONTINUED | OUTPATIENT
Start: 2024-01-15 | End: 2024-01-19 | Stop reason: HOSPADM

## 2024-01-15 RX ADMIN — METHENAMINE HIPPURATE 1 G: 1000 TABLET ORAL at 17:16

## 2024-01-15 RX ADMIN — ARIPIPRAZOLE 10 MG: 10 TABLET ORAL at 21:41

## 2024-01-15 RX ADMIN — IPRATROPIUM BROMIDE AND ALBUTEROL SULFATE 3 ML: 2.5; .5 SOLUTION RESPIRATORY (INHALATION) at 06:57

## 2024-01-15 RX ADMIN — BACLOFEN 30 MG: 10 TABLET ORAL at 08:41

## 2024-01-15 RX ADMIN — GABAPENTIN 800 MG: 400 CAPSULE ORAL at 21:43

## 2024-01-15 RX ADMIN — BUMETANIDE 2 MG: 1 TABLET ORAL at 08:41

## 2024-01-15 RX ADMIN — PIPERACILLIN SODIUM AND TAZOBACTAM SODIUM 3.38 G: 3; .375 INJECTION, POWDER, LYOPHILIZED, FOR SOLUTION INTRAVENOUS at 11:13

## 2024-01-15 RX ADMIN — BACLOFEN 30 MG: 10 TABLET ORAL at 21:42

## 2024-01-15 RX ADMIN — DULOXETINE HYDROCHLORIDE 60 MG: 60 CAPSULE, DELAYED RELEASE ORAL at 08:41

## 2024-01-15 RX ADMIN — POTASSIUM CHLORIDE 10 MEQ: 20 TABLET, EXTENDED RELEASE ORAL at 08:41

## 2024-01-15 RX ADMIN — METOLAZONE 5 MG: 2.5 TABLET ORAL at 08:41

## 2024-01-15 RX ADMIN — DANTROLENE SODIUM 100 MG: 25 CAPSULE ORAL at 08:41

## 2024-01-15 RX ADMIN — HEPARIN SODIUM 5000 UNITS: 5000 INJECTION INTRAVENOUS; SUBCUTANEOUS at 14:44

## 2024-01-15 RX ADMIN — TIZANIDINE 2 MG: 4 TABLET ORAL at 08:41

## 2024-01-15 RX ADMIN — BACLOFEN 30 MG: 10 TABLET ORAL at 17:16

## 2024-01-15 RX ADMIN — IPRATROPIUM BROMIDE AND ALBUTEROL SULFATE 3 ML: 2.5; .5 SOLUTION RESPIRATORY (INHALATION) at 19:11

## 2024-01-15 RX ADMIN — Medication 10 ML: at 08:43

## 2024-01-15 RX ADMIN — BACLOFEN 30 MG: 10 TABLET ORAL at 11:17

## 2024-01-15 RX ADMIN — DOCUSATE SODIUM 50 MG AND SENNOSIDES 8.6 MG 2 TABLET: 8.6; 5 TABLET, FILM COATED ORAL at 08:42

## 2024-01-15 RX ADMIN — Medication 1 TABLET: at 08:41

## 2024-01-15 RX ADMIN — Medication 10 ML: at 21:45

## 2024-01-15 RX ADMIN — PIPERACILLIN SODIUM AND TAZOBACTAM SODIUM 3.38 G: 3; .375 INJECTION, POWDER, LYOPHILIZED, FOR SOLUTION INTRAVENOUS at 17:16

## 2024-01-15 RX ADMIN — SPIRONOLACTONE 25 MG: 25 TABLET, FILM COATED ORAL at 08:42

## 2024-01-15 RX ADMIN — METRONIDAZOLE 500 MG: 250 TABLET ORAL at 06:28

## 2024-01-15 RX ADMIN — DOCUSATE SODIUM 50 MG AND SENNOSIDES 8.6 MG 2 TABLET: 8.6; 5 TABLET, FILM COATED ORAL at 21:41

## 2024-01-15 RX ADMIN — DANTROLENE SODIUM 100 MG: 25 CAPSULE ORAL at 21:41

## 2024-01-15 RX ADMIN — FERROUS SULFATE TAB 325 MG (65 MG ELEMENTAL FE) 325 MG: 325 (65 FE) TAB at 08:41

## 2024-01-15 RX ADMIN — IPRATROPIUM BROMIDE AND ALBUTEROL SULFATE 3 ML: 2.5; .5 SOLUTION RESPIRATORY (INHALATION) at 03:16

## 2024-01-15 RX ADMIN — DAKIN'S SOLUTION 0.125% (QUARTER STRENGTH): 0.12 SOLUTION at 08:43

## 2024-01-15 RX ADMIN — FERROUS SULFATE TAB 325 MG (65 MG ELEMENTAL FE) 325 MG: 325 (65 FE) TAB at 21:42

## 2024-01-15 RX ADMIN — HEPARIN SODIUM 5000 UNITS: 5000 INJECTION INTRAVENOUS; SUBCUTANEOUS at 21:43

## 2024-01-15 RX ADMIN — PANTOPRAZOLE SODIUM 40 MG: 40 TABLET, DELAYED RELEASE ORAL at 06:27

## 2024-01-15 RX ADMIN — DAKIN'S SOLUTION 0.125% (QUARTER STRENGTH): 0.12 SOLUTION at 21:45

## 2024-01-15 RX ADMIN — CARVEDILOL 12.5 MG: 6.25 TABLET, FILM COATED ORAL at 17:16

## 2024-01-15 RX ADMIN — CARVEDILOL 12.5 MG: 6.25 TABLET, FILM COATED ORAL at 08:42

## 2024-01-15 RX ADMIN — SACUBITRIL AND VALSARTAN 1 TABLET: 24; 26 TABLET, FILM COATED ORAL at 08:42

## 2024-01-15 RX ADMIN — METHENAMINE HIPPURATE 1 G: 1000 TABLET ORAL at 08:42

## 2024-01-15 RX ADMIN — TIZANIDINE 2 MG: 4 TABLET ORAL at 21:41

## 2024-01-15 RX ADMIN — ATORVASTATIN CALCIUM 10 MG: 10 TABLET, FILM COATED ORAL at 21:42

## 2024-01-15 RX ADMIN — DANTROLENE SODIUM 100 MG: 25 CAPSULE ORAL at 16:10

## 2024-01-15 RX ADMIN — DULOXETINE HYDROCHLORIDE 60 MG: 60 CAPSULE, DELAYED RELEASE ORAL at 21:42

## 2024-01-15 RX ADMIN — GABAPENTIN 800 MG: 400 CAPSULE ORAL at 14:45

## 2024-01-15 RX ADMIN — SACUBITRIL AND VALSARTAN 1 TABLET: 24; 26 TABLET, FILM COATED ORAL at 21:41

## 2024-01-15 RX ADMIN — GABAPENTIN 800 MG: 400 CAPSULE ORAL at 06:27

## 2024-01-15 RX ADMIN — TIZANIDINE 2 MG: 4 TABLET ORAL at 14:45

## 2024-01-15 RX ADMIN — MODAFINIL 200 MG: 100 TABLET ORAL at 08:42

## 2024-01-15 NOTE — PROGRESS NOTES
[facility] HOSPITALIST PROGRESS NOTE     Patient Identification:  Name:  Ken Krueger  Age:  48 y.o.  Sex:  male  :  1975  MRN:  [id number]  Visit Number:  [id number]  ROOM: [address] [place] [address]     Primary Care Provider:  Franchesca Hodges APRN    Length of stay in inpatient status:  6    Subjective     Chief Compliant:    Chief Complaint   Patient presents with    Wound Check     Pt states his primary sent him her for his bed sore on his buttock.      History of Presenting Illness:    Patient remains ill today, no acute events overnight, denies any fevers or chills, had developed new productive cough, discussed with Infectious Disease, repeated CXR and concern for RLL Pneumonia, antibiotics changed to Zosyn for now, Infectious Disease with plans to transition back to Ceftriaxone and Flagyl following treatment for Pneumonia, pending PICC line, pending LTACH placement, discontinued mIVFs given history Congestive Heart Failure, added SQH for DVT prophylaxis.   Objective     Current Hospital Meds:  ARIPiprazole, 10 mg, Oral, Nightly  atorvastatin, 10 mg, Oral, Nightly  baclofen, 30 mg, Oral, 4x Daily With Meals & Nightly  bumetanide, 2 mg, Oral, Daily  carvedilol, 12.5 mg, Oral, BID With Meals  dantrolene, 100 mg, Oral, TID  DULoxetine, 60 mg, Oral, BID  ferrous sulfate, 325 mg, Oral, BID  gabapentin, 800 mg, Oral, Q8H  heparin (porcine), 5,000 Units, Subcutaneous, Q8H  insulin lispro, 2-7 Units, Subcutaneous, 4x Daily AC & at Bedtime  methenamine, 1 g, Oral, BID With Meals  metOLazone, 5 mg, Oral, Once per day on   modafinil, 200 mg, Oral, Daily  multivitamin with minerals, 1 tablet, Oral, Daily  pantoprazole, 40 mg, Oral, Q AM  piperacillin-tazobactam, 3.375 g, Intravenous, Q8H  potassium chloride, 10 mEq, Oral, Daily  sacubitril-valsartan, 1 tablet, Oral, BID  senna-docusate sodium, 2 tablet, Oral, BID  sodium chloride, 10 mL, Intravenous, Q12H  sodium hypochlorite, , Topical,  "BID  spironolactone, 25 mg, Oral, Daily  tiZANidine, 2 mg, Oral, TID         ----------------------------------------------------------------------------------------------------------------------  Vital Signs:  Temp:  [97.2 °F (36.2 °C)-97.8 °F (36.6 °C)] 97.4 °F (36.3 °C)  Heart Rate:  [86-99] 88  Resp:  [18-22] 20  BP: ()/(60-73) 99/61  SpO2:  [90 %-99 %] 99 %  on  Flow (L/min):  [2] 2;   Device (Oxygen Therapy): nasal cannula  Body mass index is 41.37 kg/m².      Intake/Output Summary (Last 24 hours) at 1/15/2024 1213  Last data filed at 1/15/2024 1113  Gross per 24 hour   Intake 1940 ml   Output 3350 ml   Net -1410 ml      ----------------------------------------------------------------------------------------------------------------------  Physical exam:  Constitutional:  older than stated age.  No acute distress.      HENT:  Head:  Normocephalic and atraumatic.  Mouth:  Moist mucous membranes.    Eyes:  Conjunctivae and EOM are normal. No scleral icterus.    Neck:  Neck supple.  No JVD present.    Cardiovascular:  Normal rate, regular rhythm and normal heart sounds with no murmur.  Pulmonary/Chest:  No respiratory distress, no wheezes, no crackles,on NC  Abdominal:  Soft. No distension and no tenderness.   Musculoskeletal:  No tenderness.  No red or swollen joints anywhere.    Neurological:  Alert and oriented to person, place.  No cranial nerve deficit.    Skin:  Skin is warm and dry. No rash noted. No pallor.   Peripheral vascular:  No clubbing, no cyanosis, no edema.  Psychiatric: Appropriate mood and affect    Edited by: Primitivo Cho MD at 1/15/2024 1211  ----------------------------------------------------------------------------------------------------------------------  WBC/HGB/HCT/PLT   10.11/8.7/32.3/392 (01/15 0243)  BUN/CREAT/GLUC/ALT/AST/FINN/LIP    22/0.72/110/--/--/--/-- (01/15 0243)  LYTES - Na/K/Cl/CO2: 140/3.9/106/22.1 (01/15 0243)        No results found for: \"URINECX\"  Blood Culture "   Date Value Ref Range Status   01/13/2024 No growth at 24 hours  Preliminary   01/13/2024 No growth at 24 hours  Preliminary   01/08/2024 No growth at 5 days  Final   01/08/2024 No growth at 5 days  Final       I have personally looked at the labs and they are summarized above.  ----------------------------------------------------------------------------------------------------------------------  Detailed radiology reports for the last 24 hours:  XR Chest 1 View    Result Date: 1/15/2024   1. Bibasilar airspace disease    This report was finalized on 1/15/2024 9:45 AM by Dr. Hernesto Alaniz MD.     Assessment & Plan    #Sacral decubitus ulcer - General Surgery consulted bedside debridement performed during this hospitalization. Reportedly patient is not an appropriate candidate for wound VAC at this time.  Will continue wound care per wound care nurse practitioner recommendations.  LTAC consult currently pending.  Wound culture demonstrates E. coli and Proteus, has been on Flagyl and Rocephin per ID recommendations. Discussed with team today, some concerns for developing respiratory symptoms, CXR obtained and noting RLL disease, respiratory culture ordered and pending collection. Antibiotics escalated to Zosyn today per Infectious Disease     #Acute on chronic osteomyelitis, now with concern for Pneumonia, Bacterial, treating for Aspiration - Continue Zosyn as per above per ID recommendations.  Will likely require IV antibiotic therapy for 4 to 6 weeks.  Currently awaiting PICC line placement, will need Ceftriaxone long term through 2/18 and Flagyl through 1/21 per Infectious Disease recommendations      #Severe sepsis (SHILPA) secondary to chronic sacral decubitus ulcer; Treatment as per above     #Chronic HFpEF/HFrEF - no evidence of exacerbation at this time     #Essential hypertension - well-controlled     #Type 2 diabetes mellitus - continue Accu-Cheks before every meal and nightly with sliding scale insulin      #Morbid obesity - complicates all aspects of care     #Mild confusion - resolved, was likely secondary to mild hospital associated delirium     F: Oral  E: Monitor & Replace as needed   N: Diet: Diabetic Diets; Consistent Carbohydrate; Texture: Regular Texture (IDDSI 7); Fluid Consistency: Thin (IDDSI 0)  PPx: SQH  Code Status (Patient has no pulse and is not breathing): CPR  Medical Interventions (Patient has pulse or is breathing): Full Support     Dispo: Pending workup and clinical improvement, pending LTACH approval  Edited by: Primitivo Cho MD at 1/15/2024 1213  [facility]

## 2024-01-15 NOTE — CASE MANAGEMENT/SOCIAL WORK
Discharge Planning Assessment   Fabricio     Patient Name: Ken Krueger  MRN: [id number]  Today's Date: 1/15/2024    Admit Date: 1/8/2024    Plan: CM spoke with Hollie  in [facility] pre-auth still pending.   Discharge Needs Assessment    No documentation.                  Discharge Plan       Row Name 01/15/24 1628       Plan    Plan CM spoke with Hollie  in [facility] pre-auth still pending.                          Laurence Gonzalez RN
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
## 2024-01-15 NOTE — PROGRESS NOTES
PROGRESS NOTE         Patient Identification:  Name:  Ken Krueger  Age:  48 y.o.  Sex:  male  :  1975  MRN:  [id number]  Visit Number:  [id number]  Primary Care Provider:  Franchesca Hodges APRN         LOS: 6 days       ----------------------------------------------------------------------------------------------------------------------  Subjective       Chief Complaints:    Wound Check (Pt states his primary sent him her for his bed sore on his buttock. )        Interval History:      The patient is awake and alert, ill-appearing.  Continues to complain of orthopnea with some use of accessory muscles.  On 2 L nasal cannula.  Afebrile.  No reports of excessive colostomy output.  Lung exam is markable for scattered rhonchi bilaterally to auscultation.  Abdomen is rounded with normoactive bowel sounds, colostomy to left upper quadrant and urostomy to right upper quadrant.  Stoma's without any surrounding erythema or signs of infection.  WBC normal at 10.11.  Blood cultures from  preliminarily report no growth at 24 hours.  Tissue/bone culture from 1/10 finalized with light growth E. coli and scant growth Proteus mirabilis.  Urine culture is pending.      Review of Systems:    Constitutional: no fever, no chills. No night sweats. No appetite change or unexpected weight change. No fatigue.  Eyes: no eye drainage, itching or redness.  HEENT: no mouth sores, dysphagia or nose bleed.  Respiratory: no for shortness of breath, cough or production of sputum. orthopnea, use of accessory muscles  Cardiovascular: no chest pain, no palpitations, no orthopnea.  Gastrointestinal: no nausea, vomiting or diarrhea. No abdominal pain, hematemesis or rectal bleeding.  Genitourinary: no dysuria or polyuria.  Hematologic/lymphatic: no lymph node abnormalities, no easy bruising or easy bleeding.  Musculoskeletal: no muscle or joint pain.  Skin: Multiple decubitus ulcers.  Neurological: no loss of consciousness, no  seizure, no headache.  Behavioral/Psych: no depression or suicidal ideation.  Endocrine: no hot flashes.  Immunologic: negative.    ----------------------------------------------------------------------------------------------------------------------      Objective       Current [facility] Meds:  ARIPiprazole, 10 mg, Oral, Nightly  atorvastatin, 10 mg, Oral, Nightly  baclofen, 30 mg, Oral, 4x Daily With Meals & Nightly  bumetanide, 2 mg, Oral, Daily  carvedilol, 12.5 mg, Oral, BID With Meals  cefTRIAXone, 2,000 mg, Intravenous, Q24H  dantrolene, 100 mg, Oral, TID  DULoxetine, 60 mg, Oral, BID  ferrous sulfate, 325 mg, Oral, BID  gabapentin, 800 mg, Oral, Q8H  insulin lispro, 2-7 Units, Subcutaneous, 4x Daily AC & at Bedtime  methenamine, 1 g, Oral, BID With Meals  metOLazone, 5 mg, Oral, Once per day on Monday Wednesday Friday  metroNIDAZOLE, 500 mg, Oral, Q8H  modafinil, 200 mg, Oral, Daily  multivitamin with minerals, 1 tablet, Oral, Daily  pantoprazole, 40 mg, Oral, Q AM  potassium chloride, 10 mEq, Oral, Daily  sacubitril-valsartan, 1 tablet, Oral, BID  senna-docusate sodium, 2 tablet, Oral, BID  sodium chloride, 10 mL, Intravenous, Q12H  sodium hypochlorite, , Topical, BID  spironolactone, 25 mg, Oral, Daily  tiZANidine, 2 mg, Oral, TID      sodium chloride, 75 mL/hr, Last Rate: Stopped (01/13/24 0226)      ----------------------------------------------------------------------------------------------------------------------    Vital Signs:  Temp:  [97.2 °F (36.2 °C)-97.8 °F (36.6 °C)] 97.8 °F (36.6 °C)  Heart Rate:  [84-92] 89  Resp:  [18-22] 18  BP: ()/(60-73) 110/73  No data found.    SpO2 Percentage    01/14/24 1847 01/15/24 0300 01/15/24 0316   SpO2: 91% 96% 95%     SpO2:  [90 %-96 %] 95 %  on  Flow (L/min):  [2] 2;   Device (Oxygen Therapy): nasal cannula    Body mass index is 41.37 kg/m².  Wt Readings from Last 3 Encounters:   01/09/24 135 kg (296 lb 9.6 oz)   12/27/23 (!) 148 kg (326 lb)   12/14/23  (!) 148 kg (326 lb)        Intake/Output Summary (Last 24 hours) at 1/15/2024 0820  Last data filed at 1/15/2024 0800  Gross per 24 hour   Intake 2300 ml   Output 3350 ml   Net -1050 ml     Diet: Diabetic Diets; Consistent Carbohydrate; Texture: Regular Texture (IDDSI 7); Fluid Consistency: Thin (IDDSI 0)  ----------------------------------------------------------------------------------------------------------------------      Physical Exam:    Constitutional: Generally ill-appearing.  Complains of orthopnea with use of accessory muscles.  On 2 L nasal cannula.  Eating breakfast.  HENT:  Head: Normocephalic and atraumatic.  Mouth:  Moist mucous membranes.    Eyes:  Conjunctivae and EOM are normal.  No scleral icterus.  Neck:  Neck supple.  No JVD present.    Cardiovascular:  Normal rate, regular rhythm and normal heart sounds with no murmur. No edema.  Pulmonary/Chest: Scattered rhonchi bilaterally to auscultation.  Abdominal:  Soft.  Bowel sounds are normal.  No distension and no tenderness.  Ostomy.  Musculoskeletal: Left AKA.  Functional paraplegia.  Neurological:  Alert and oriented to person, place, and time.  No facial droop.  No slurred speech.   Skin:  Skin is warm and dry.  No rash noted.  No pallor.  Sacral decubitus ulcer, left gluteal pressure ulcer with no evidence of active infection. Right lower gluteal ulcer with necrotic tissue present.  Wounds are dressed, clean dry and intact psychiatric:  Normal mood and affect.  Behavior is normal.        ----------------------------------------------------------------------------------------------------------------------          Results from last 7 days   Lab Units 01/13/24  1542   PH, ARTERIAL pH units 7.354   PO2 ART mm Hg 60.0*   PCO2, ARTERIAL mm Hg 45.8*   HCO3 ART mmol/L 25.5     Results from last 7 days   Lab Units 01/15/24  0243 01/13/24  0220 01/12/24  0115 01/10/24  0248 01/09/24  0541 01/09/24  0312 01/08/24  2322   CRP mg/dL  --   --   --   --    --   --  15.63*   LACTATE mmol/L  --   --   --   --  2.0 2.1* 2.3*   WBC 10*3/mm3 10.11 8.31 9.65   < > 11.62*  --  13.21*   HEMOGLOBIN g/dL 8.7* 8.0* 7.9*   < > 8.3*  --  9.0*   HEMATOCRIT % 32.3* 29.5* 28.0*   < > 30.0*  --  32.5*   MCV fL 86.8 84.5 82.4   < > 83.6  --  83.8   MCHC g/dL 26.9* 27.1* 28.2*   < > 27.7*  --  27.7*   PLATELETS 10*3/mm3 392 422 475*   < > 430  --  468*    < > = values in this interval not displayed.     Results from last 7 days   Lab Units 01/15/24  0243 01/13/24  0220 01/12/24  0115 01/10/24  0248 01/09/24  0541 01/08/24  2322   SODIUM mmol/L 140 141 140   < > 134* 134*   POTASSIUM mmol/L 3.9 3.8 4.1   < > 4.1 4.2   CHLORIDE mmol/L 106 108* 105   < > 100 99   CO2 mmol/L 22.1 21.8* 22.2   < > 23.7 24.7   BUN mg/dL 22* 19 23*   < > 22* 22*   CREATININE mg/dL 0.72* 0.75* 0.77   < > 0.98 0.97   CALCIUM mg/dL 9.0 8.6 8.7   < > 8.6 9.2   GLUCOSE mg/dL 110* 129* 131*   < > 126* 113*   ALBUMIN g/dL  --   --   --   --  2.8* 3.4*   BILIRUBIN mg/dL  --   --   --   --  0.3 0.3   ALK PHOS U/L  --   --   --   --  124* 144*   AST (SGOT) U/L  --   --   --   --  11 10   ALT (SGPT) U/L  --   --   --   --  12 12    < > = values in this interval not displayed.   Estimated Creatinine Clearance: 179.9 mL/min (A) (by C-G formula based on SCr of 0.72 mg/dL (L)).  Ammonia   Date Value Ref Range Status   01/13/2024 33 16 - 60 umol/L Final       Glucose   Date/Time Value Ref Range Status   01/15/2024 0634 114 70 - 130 mg/dL Final   01/14/2024 2054 114 70 - 130 mg/dL Final   01/14/2024 1616 113 70 - 130 mg/dL Final   01/14/2024 1032 133 (H) 70 - 130 mg/dL Final   01/14/2024 0620 109 70 - 130 mg/dL Final   01/13/2024 2105 108 70 - 130 mg/dL Final   01/13/2024 1626 118 70 - 130 mg/dL Final   01/13/2024 1042 136 (H) 70 - 130 mg/dL Final     Lab Results   Component Value Date    HGBA1C 11.50 (H) 02/13/2023     Lab Results   Component Value Date    TSH 3.780 07/19/2022    FREET4 1.31 06/02/2021       Blood Culture  "  Date Value Ref Range Status   01/08/2024 No growth at 24 hours  Preliminary   01/08/2024 No growth at 24 hours  Preliminary     No results found for: \"URINECX\"  No results found for: \"WOUNDCX\"  No results found for: \"STOOLCX\"  No results found for: \"RESPCX\"  Pain Management Panel  More data exists         Latest Ref Rng & Units 1/13/2024 4/9/2023   Pain Management Panel   Creatinine, Urine mg/dL - 79.5    Amphetamine, Urine Qual Negative Negative  -   Barbiturates Screen, Urine Negative Negative  -   Benzodiazepine Screen, Urine Negative Negative  -   Buprenorphine, Screen, Urine Negative Negative  -   Cocaine Screen, Urine Negative Negative  -   Fentanyl, Urine Negative Negative  -   Methadone Screen , Urine Negative Negative  -   Methamphetamine, Ur Negative Negative  -         ----------------------------------------------------------------------------------------------------------------------  Imaging Results (Last 24 Hours)       ** No results found for the last 24 hours. **            ----------------------------------------------------------------------------------------------------------------------    Pertinent Infectious Disease Results      Assessment/Plan       Assessment     Severe sepsis with lactic acid greater than 2 on admission  Acute on chronic osteomyelitis        Plan      I saw and examined the patient myself this morning with GUANACO Winston and discussed the plan of care with her and primary RN and here are my findings:    The patient is awake and alert but appears ill. The ongoing complaint of orthopnea persists, accompanied by the use of accessory muscles. The patient is currently on 2 L nasal cannula and is afebrile, with no reports of excessive colostomy output. Lung examination reveals scattered rhonchi bilaterally upon auscultation. The abdomen is rounded with normoactive bowel sounds, featuring a colostomy to the left upper quadrant and a urostomy to the right upper quadrant. " Stomas show no surrounding erythema or signs of infection. The white blood cell count is within the normal range at 10.11. Preliminary blood cultures from 1/13 indicate no growth at 24 hours. Tissue/bone culture from 1/10 has been finalized, showing light growth of E. coli and scant growth of Proteus mirabilis. A urine culture is pending.    Susceptibility testing indicates that E. coli and Proteus growth on tissue cultures respond to ceftriaxone. The current plan is to continue with metronidazole 500 mg IV every 8 hours through 1/21/2024 and ceftriaxone 2 g every 24 hours through 2/18/2024 for sacral osteomyelitis. The wound care team is closely monitoring and considering potential wound VAC placement soon. A referral to a Long-Term Acute Care facility for intravenous antibiotic therapy and wound care is recommended.    Given the overall worsening in the patient's general appearance, particularly with orthopnea and accessory muscle use, a stat chest x-ray has been ordered to assess for possible aspiration. I reviewed the CXR and I see a new RLL infiltrate most likely suggestive of aspiration pneumonia and antibiotic therapy was escalated to piperacillin/tazobactam 3.375 g IV every 8 hours x 7 days and afterwards switch to ceftriaxone to continue through 2/18/2024 and metronidazole 500 mg IV every 8 hours to continue through 1/21/2024.    ANTIMICROBIAL THERAPY    cefTRIAXone (ROCEPHIN) 2000 mg IVPB in 100 mL NS (VTB)  methenamine - 1 g, 1 g  metroNIDAZOLE - 250 MG     Code Status:   Code Status and Medical Interventions:   Ordered at: 01/09/24 0247     Code Status (Patient has no pulse and is not breathing):    CPR (Attempt to Resuscitate)     Medical Interventions (Patient has pulse or is breathing):    Full Support       GUANACO Finley  01/15/24  08:20 EST    Electronically signed by GUANACO Finley, 01/15/24, 8:25 AM EST.      Electronically signed by Tra Jain MD, 01/15/24, 11:03 AM  EST.

## 2024-01-15 NOTE — PLAN OF CARE
Goal Outcome Evaluation:              Outcome Evaluation: Pt rested in bed this shift. Wound care done as ordered. No new complaints or concerns,  VSS and will continue with POC.

## 2024-01-16 LAB
A-A DO2: 61 MMHG (ref 0–300)
A-A DO2: 87.6 MMHG (ref 0–300)
ANION GAP SERPL CALCULATED.3IONS-SCNC: 11.8 MMOL/L (ref 5–15)
ANISOCYTOSIS BLD QL: NORMAL
ARTERIAL PATENCY WRIST A: ABNORMAL
ARTERIAL PATENCY WRIST A: ABNORMAL
ATMOSPHERIC PRESS: 729 MMHG
ATMOSPHERIC PRESS: 729 MMHG
BACTERIA SPEC AEROBE CULT: NORMAL
BASE EXCESS BLDA CALC-SCNC: 0.7 MMOL/L (ref 0–2)
BASE EXCESS BLDA CALC-SCNC: 1.7 MMOL/L (ref 0–2)
BASOPHILS # BLD AUTO: 0.03 10*3/MM3 (ref 0–0.2)
BASOPHILS NFR BLD AUTO: 0.4 % (ref 0–1.5)
BDY SITE: ABNORMAL
BDY SITE: ABNORMAL
BUN SERPL-MCNC: 23 MG/DL (ref 6–20)
BUN/CREAT SERPL: 33.8 (ref 7–25)
CALCIUM SPEC-SCNC: 8.8 MG/DL (ref 8.6–10.5)
CHLORIDE SERPL-SCNC: 107 MMOL/L (ref 98–107)
CO2 BLDA-SCNC: 30.4 MMOL/L (ref 22–33)
CO2 BLDA-SCNC: 30.6 MMOL/L (ref 22–33)
CO2 SERPL-SCNC: 22.2 MMOL/L (ref 22–29)
COHGB MFR BLD: 1.9 % (ref 0–5)
COHGB MFR BLD: 2.1 % (ref 0–5)
CREAT SERPL-MCNC: 0.68 MG/DL (ref 0.76–1.27)
DEPRECATED RDW RBC AUTO: 56.5 FL (ref 37–54)
EGFRCR SERPLBLD CKD-EPI 2021: 116.1 ML/MIN/1.73
EOSINOPHIL # BLD AUTO: 0.12 10*3/MM3 (ref 0–0.4)
EOSINOPHIL NFR BLD AUTO: 1.8 % (ref 0.3–6.2)
EPAP: 14
ERYTHROCYTE [DISTWIDTH] IN BLOOD BY AUTOMATED COUNT: 18.4 % (ref 12.3–15.4)
GAS FLOW AIRWAY: 4 LPM
GLUCOSE BLDC GLUCOMTR-MCNC: 106 MG/DL (ref 70–130)
GLUCOSE BLDC GLUCOMTR-MCNC: 109 MG/DL (ref 70–130)
GLUCOSE BLDC GLUCOMTR-MCNC: 117 MG/DL (ref 70–130)
GLUCOSE SERPL-MCNC: 99 MG/DL (ref 65–99)
HCO3 BLDA-SCNC: 28.4 MMOL/L (ref 20–26)
HCO3 BLDA-SCNC: 28.8 MMOL/L (ref 20–26)
HCT VFR BLD AUTO: 33.2 % (ref 37.5–51)
HCT VFR BLD CALC: 25.3 % (ref 38–51)
HCT VFR BLD CALC: 26.2 % (ref 38–51)
HGB BLD-MCNC: 8.8 G/DL (ref 13–17.7)
HGB BLDA-MCNC: 8.3 G/DL (ref 14–18)
HGB BLDA-MCNC: 8.5 G/DL (ref 14–18)
HYPOCHROMIA BLD QL: NORMAL
IMM GRANULOCYTES # BLD AUTO: 0.04 10*3/MM3 (ref 0–0.05)
IMM GRANULOCYTES NFR BLD AUTO: 0.6 % (ref 0–0.5)
INHALED O2 CONCENTRATION: 30 %
INHALED O2 CONCENTRATION: 36 %
IPAP: 20
LYMPHOCYTES # BLD AUTO: 1.17 10*3/MM3 (ref 0.7–3.1)
LYMPHOCYTES NFR BLD AUTO: 17.2 % (ref 19.6–45.3)
Lab: ABNORMAL
Lab: ABNORMAL
MCH RBC QN AUTO: 23 PG (ref 26.6–33)
MCHC RBC AUTO-ENTMCNC: 26.5 G/DL (ref 31.5–35.7)
MCV RBC AUTO: 86.9 FL (ref 79–97)
METHGB BLD QL: 0 % (ref 0–3)
METHGB BLD QL: 0.1 % (ref 0–3)
MODALITY: ABNORMAL
MODALITY: ABNORMAL
MONOCYTES # BLD AUTO: 0.35 10*3/MM3 (ref 0.1–0.9)
MONOCYTES NFR BLD AUTO: 5.1 % (ref 5–12)
NEUTROPHILS NFR BLD AUTO: 5.1 10*3/MM3 (ref 1.7–7)
NEUTROPHILS NFR BLD AUTO: 74.9 % (ref 42.7–76)
NOTIFIED BY: ABNORMAL
NOTIFIED BY: ABNORMAL
NRBC BLD AUTO-RTO: 0.3 /100 WBC (ref 0–0.2)
OXYHGB MFR BLDV: 93.2 % (ref 94–99)
OXYHGB MFR BLDV: 94.2 % (ref 94–99)
PCO2 BLDA: 59.1 MM HG (ref 35–45)
PCO2 BLDA: 63.4 MM HG (ref 35–45)
PCO2 TEMP ADJ BLD: ABNORMAL MM[HG]
PCO2 TEMP ADJ BLD: ABNORMAL MM[HG]
PH BLDA: 7.26 PH UNITS (ref 7.35–7.45)
PH BLDA: 7.3 PH UNITS (ref 7.35–7.45)
PH, TEMP CORRECTED: ABNORMAL
PH, TEMP CORRECTED: ABNORMAL
PLAT MORPH BLD: NORMAL
PLATELET # BLD AUTO: 400 10*3/MM3 (ref 140–450)
PMV BLD AUTO: 9.3 FL (ref 6–12)
PO2 BLDA: 77.7 MM HG (ref 83–108)
PO2 BLDA: 88 MM HG (ref 83–108)
PO2 TEMP ADJ BLD: ABNORMAL MM[HG]
PO2 TEMP ADJ BLD: ABNORMAL MM[HG]
POTASSIUM SERPL-SCNC: 3.6 MMOL/L (ref 3.5–5.2)
RBC # BLD AUTO: 3.82 10*6/MM3 (ref 4.14–5.8)
SAO2 % BLDCOA: 95.2 % (ref 94–99)
SAO2 % BLDCOA: 96.1 % (ref 94–99)
SET MECH RESP RATE: 20
SODIUM SERPL-SCNC: 141 MMOL/L (ref 136–145)
VENTILATOR MODE: ABNORMAL
VENTILATOR MODE: ABNORMAL
WBC NRBC COR # BLD AUTO: 6.81 10*3/MM3 (ref 3.4–10.8)

## 2024-01-16 PROCEDURE — 94799 UNLISTED PULMONARY SVC/PX: CPT

## 2024-01-16 PROCEDURE — C1751 CATH, INF, PER/CENT/MIDLINE: HCPCS

## 2024-01-16 PROCEDURE — 94761 N-INVAS EAR/PLS OXIMETRY MLT: CPT

## 2024-01-16 PROCEDURE — 83050 HGB METHEMOGLOBIN QUAN: CPT

## 2024-01-16 PROCEDURE — 82375 ASSAY CARBOXYHB QUANT: CPT

## 2024-01-16 PROCEDURE — 94664 DEMO&/EVAL PT USE INHALER: CPT

## 2024-01-16 PROCEDURE — 80048 BASIC METABOLIC PNL TOTAL CA: CPT | Performed by: INTERNAL MEDICINE

## 2024-01-16 PROCEDURE — 05HY33Z INSERTION OF INFUSION DEVICE INTO UPPER VEIN, PERCUTANEOUS APPROACH: ICD-10-PCS | Performed by: INTERNAL MEDICINE

## 2024-01-16 PROCEDURE — 94660 CPAP INITIATION&MGMT: CPT

## 2024-01-16 PROCEDURE — 85007 BL SMEAR W/DIFF WBC COUNT: CPT | Performed by: INTERNAL MEDICINE

## 2024-01-16 PROCEDURE — 36600 WITHDRAWAL OF ARTERIAL BLOOD: CPT

## 2024-01-16 PROCEDURE — 82805 BLOOD GASES W/O2 SATURATION: CPT

## 2024-01-16 PROCEDURE — 85025 COMPLETE CBC W/AUTO DIFF WBC: CPT | Performed by: INTERNAL MEDICINE

## 2024-01-16 PROCEDURE — 99232 SBSQ HOSP IP/OBS MODERATE 35: CPT | Performed by: NURSE PRACTITIONER

## 2024-01-16 PROCEDURE — 25010000002 PIPERACILLIN SOD-TAZOBACTAM PER 1 G: Performed by: INTERNAL MEDICINE

## 2024-01-16 PROCEDURE — 82948 REAGENT STRIP/BLOOD GLUCOSE: CPT

## 2024-01-16 PROCEDURE — 99232 SBSQ HOSP IP/OBS MODERATE 35: CPT | Performed by: INTERNAL MEDICINE

## 2024-01-16 PROCEDURE — 25010000002 HEPARIN (PORCINE) PER 1000 UNITS: Performed by: INTERNAL MEDICINE

## 2024-01-16 RX ORDER — GABAPENTIN 400 MG/1
400 CAPSULE ORAL EVERY 12 HOURS SCHEDULED
Status: DISCONTINUED | OUTPATIENT
Start: 2024-01-16 | End: 2024-01-19 | Stop reason: HOSPADM

## 2024-01-16 RX ORDER — SODIUM CHLORIDE 0.9 % (FLUSH) 0.9 %
10 SYRINGE (ML) INJECTION EVERY 12 HOURS SCHEDULED
Status: DISCONTINUED | OUTPATIENT
Start: 2024-01-16 | End: 2024-01-19 | Stop reason: HOSPADM

## 2024-01-16 RX ORDER — SODIUM CHLORIDE 0.9 % (FLUSH) 0.9 %
20 SYRINGE (ML) INJECTION AS NEEDED
Status: DISCONTINUED | OUTPATIENT
Start: 2024-01-16 | End: 2024-01-19 | Stop reason: HOSPADM

## 2024-01-16 RX ORDER — GABAPENTIN 400 MG/1
400 CAPSULE ORAL EVERY 12 HOURS SCHEDULED
Status: DISCONTINUED | OUTPATIENT
Start: 2024-01-16 | End: 2024-01-16

## 2024-01-16 RX ORDER — SODIUM CHLORIDE 0.9 % (FLUSH) 0.9 %
10 SYRINGE (ML) INJECTION AS NEEDED
Status: DISCONTINUED | OUTPATIENT
Start: 2024-01-16 | End: 2024-01-19 | Stop reason: HOSPADM

## 2024-01-16 RX ORDER — IPRATROPIUM BROMIDE AND ALBUTEROL SULFATE 2.5; .5 MG/3ML; MG/3ML
3 SOLUTION RESPIRATORY (INHALATION)
Status: DISCONTINUED | OUTPATIENT
Start: 2024-01-16 | End: 2024-01-19 | Stop reason: HOSPADM

## 2024-01-16 RX ORDER — SODIUM CHLORIDE 9 MG/ML
40 INJECTION, SOLUTION INTRAVENOUS AS NEEDED
Status: DISCONTINUED | OUTPATIENT
Start: 2024-01-16 | End: 2024-01-19 | Stop reason: HOSPADM

## 2024-01-16 RX ADMIN — BACLOFEN 30 MG: 10 TABLET ORAL at 08:00

## 2024-01-16 RX ADMIN — SPIRONOLACTONE 25 MG: 25 TABLET, FILM COATED ORAL at 08:00

## 2024-01-16 RX ADMIN — CARVEDILOL 12.5 MG: 6.25 TABLET, FILM COATED ORAL at 07:59

## 2024-01-16 RX ADMIN — Medication 10 ML: at 21:04

## 2024-01-16 RX ADMIN — DAKIN'S SOLUTION 0.125% (QUARTER STRENGTH): 0.12 SOLUTION at 08:01

## 2024-01-16 RX ADMIN — BUMETANIDE 2 MG: 1 TABLET ORAL at 08:00

## 2024-01-16 RX ADMIN — HEPARIN SODIUM 5000 UNITS: 5000 INJECTION INTRAVENOUS; SUBCUTANEOUS at 21:01

## 2024-01-16 RX ADMIN — ATORVASTATIN CALCIUM 10 MG: 10 TABLET, FILM COATED ORAL at 21:02

## 2024-01-16 RX ADMIN — Medication 10 ML: at 13:10

## 2024-01-16 RX ADMIN — DAKIN'S SOLUTION 0.125% (QUARTER STRENGTH): 0.12 SOLUTION at 21:04

## 2024-01-16 RX ADMIN — Medication 1 TABLET: at 08:00

## 2024-01-16 RX ADMIN — ARIPIPRAZOLE 10 MG: 10 TABLET ORAL at 21:03

## 2024-01-16 RX ADMIN — DULOXETINE HYDROCHLORIDE 60 MG: 60 CAPSULE, DELAYED RELEASE ORAL at 21:03

## 2024-01-16 RX ADMIN — GABAPENTIN 800 MG: 400 CAPSULE ORAL at 05:13

## 2024-01-16 RX ADMIN — SACUBITRIL AND VALSARTAN 1 TABLET: 24; 26 TABLET, FILM COATED ORAL at 21:03

## 2024-01-16 RX ADMIN — DANTROLENE SODIUM 100 MG: 25 CAPSULE ORAL at 16:53

## 2024-01-16 RX ADMIN — MODAFINIL 200 MG: 100 TABLET ORAL at 08:00

## 2024-01-16 RX ADMIN — GABAPENTIN 800 MG: 400 CAPSULE ORAL at 13:10

## 2024-01-16 RX ADMIN — IPRATROPIUM BROMIDE AND ALBUTEROL SULFATE 3 ML: 2.5; .5 SOLUTION RESPIRATORY (INHALATION) at 19:07

## 2024-01-16 RX ADMIN — MUPIROCIN 1 APPLICATION: 20 OINTMENT TOPICAL at 21:04

## 2024-01-16 RX ADMIN — GABAPENTIN 400 MG: 400 CAPSULE ORAL at 21:02

## 2024-01-16 RX ADMIN — FERROUS SULFATE TAB 325 MG (65 MG ELEMENTAL FE) 325 MG: 325 (65 FE) TAB at 21:03

## 2024-01-16 RX ADMIN — BACLOFEN 30 MG: 10 TABLET ORAL at 13:09

## 2024-01-16 RX ADMIN — MUPIROCIN 1 APPLICATION: 20 OINTMENT TOPICAL at 13:10

## 2024-01-16 RX ADMIN — DANTROLENE SODIUM 100 MG: 25 CAPSULE ORAL at 08:01

## 2024-01-16 RX ADMIN — DOCUSATE SODIUM 50 MG AND SENNOSIDES 8.6 MG 2 TABLET: 8.6; 5 TABLET, FILM COATED ORAL at 21:03

## 2024-01-16 RX ADMIN — METHENAMINE HIPPURATE 1 G: 1000 TABLET ORAL at 17:00

## 2024-01-16 RX ADMIN — PANTOPRAZOLE SODIUM 40 MG: 40 TABLET, DELAYED RELEASE ORAL at 05:13

## 2024-01-16 RX ADMIN — PIPERACILLIN SODIUM AND TAZOBACTAM SODIUM 3.38 G: 3; .375 INJECTION, POWDER, LYOPHILIZED, FOR SOLUTION INTRAVENOUS at 08:02

## 2024-01-16 RX ADMIN — DOCUSATE SODIUM 50 MG AND SENNOSIDES 8.6 MG 2 TABLET: 8.6; 5 TABLET, FILM COATED ORAL at 08:00

## 2024-01-16 RX ADMIN — PIPERACILLIN SODIUM AND TAZOBACTAM SODIUM 3.38 G: 3; .375 INJECTION, POWDER, LYOPHILIZED, FOR SOLUTION INTRAVENOUS at 16:53

## 2024-01-16 RX ADMIN — TIZANIDINE 2 MG: 4 TABLET ORAL at 08:00

## 2024-01-16 RX ADMIN — DANTROLENE SODIUM 100 MG: 25 CAPSULE ORAL at 21:02

## 2024-01-16 RX ADMIN — METHENAMINE HIPPURATE 1 G: 1000 TABLET ORAL at 08:01

## 2024-01-16 RX ADMIN — FERROUS SULFATE TAB 325 MG (65 MG ELEMENTAL FE) 325 MG: 325 (65 FE) TAB at 08:00

## 2024-01-16 RX ADMIN — PIPERACILLIN SODIUM AND TAZOBACTAM SODIUM 3.38 G: 3; .375 INJECTION, POWDER, LYOPHILIZED, FOR SOLUTION INTRAVENOUS at 00:44

## 2024-01-16 RX ADMIN — HEPARIN SODIUM 5000 UNITS: 5000 INJECTION INTRAVENOUS; SUBCUTANEOUS at 13:10

## 2024-01-16 RX ADMIN — SACUBITRIL AND VALSARTAN 1 TABLET: 24; 26 TABLET, FILM COATED ORAL at 08:00

## 2024-01-16 RX ADMIN — HEPARIN SODIUM 5000 UNITS: 5000 INJECTION INTRAVENOUS; SUBCUTANEOUS at 05:13

## 2024-01-16 RX ADMIN — BACLOFEN 30 MG: 10 TABLET ORAL at 21:03

## 2024-01-16 RX ADMIN — POTASSIUM CHLORIDE 10 MEQ: 20 TABLET, EXTENDED RELEASE ORAL at 08:00

## 2024-01-16 RX ADMIN — Medication 10 ML: at 08:01

## 2024-01-16 RX ADMIN — BACLOFEN 30 MG: 10 TABLET ORAL at 17:00

## 2024-01-16 RX ADMIN — DULOXETINE HYDROCHLORIDE 60 MG: 60 CAPSULE, DELAYED RELEASE ORAL at 08:00

## 2024-01-16 NOTE — PROGRESS NOTES
[facility] HOSPITALIST PROGRESS NOTE     Patient Identification:  Name:  Ken Krueger  Age:  48 y.o.  Sex:  male  :  1975  MRN:  [id number]  Visit Number:  [id number]  ROOM: [address] [place] [address]     Primary Care Provider:  Franchesca Hodges APRN    Length of stay in inpatient status:  7    Subjective     Chief Compliant:    Chief Complaint   Patient presents with    Wound Check     Pt states his primary sent him her for his bed sore on his buttock.      History of Presenting Illness:    Patient remains ill but stable today, no acute events overnight, no new complaints, denies any fevers or chills, mildly sleepy today, check ABG, otherwise continued on new Zosyn due to concern for Pneumonia, Infectious Disease consulted and following, has midline, pending [facility] approval for long term antibiotics.   Objective     Current Hospital Meds:  ARIPiprazole, 10 mg, Oral, Nightly  atorvastatin, 10 mg, Oral, Nightly  baclofen, 30 mg, Oral, 4x Daily With Meals & Nightly  bumetanide, 2 mg, Oral, Daily  carvedilol, 12.5 mg, Oral, BID With Meals  dantrolene, 100 mg, Oral, TID  DULoxetine, 60 mg, Oral, BID  ferrous sulfate, 325 mg, Oral, BID  gabapentin, 800 mg, Oral, Q8H  heparin (porcine), 5,000 Units, Subcutaneous, Q8H  insulin lispro, 2-7 Units, Subcutaneous, 4x Daily AC & at Bedtime  methenamine, 1 g, Oral, BID With Meals  metOLazone, 5 mg, Oral, Once per day on   modafinil, 200 mg, Oral, Daily  multivitamin with minerals, 1 tablet, Oral, Daily  pantoprazole, 40 mg, Oral, Q AM  piperacillin-tazobactam, 3.375 g, Intravenous, Q8H  potassium chloride, 10 mEq, Oral, Daily  sacubitril-valsartan, 1 tablet, Oral, BID  senna-docusate sodium, 2 tablet, Oral, BID  sodium chloride, 10 mL, Intravenous, Q12H  sodium hypochlorite, , Topical, BID  spironolactone, 25 mg, Oral, Daily  tiZANidine, 2 mg, Oral, TID          ----------------------------------------------------------------------------------------------------------------------  Vital Signs:  Temp:  [98 °F (36.7 °C)-98.6 °F (37 °C)] 98.6 °F (37 °C)  Heart Rate:  [70-85] 79  Resp:  [18-20] 18  BP: (106-118)/(60-84) 108/72  SpO2:  [96 %-97 %] 97 %  on  Flow (L/min):  [2] 2;   Device (Oxygen Therapy): nasal cannula  Body mass index is 41.37 kg/m².      Intake/Output Summary (Last 24 hours) at 1/16/2024 1044  Last data filed at 1/16/2024 0900  Gross per 24 hour   Intake 740 ml   Output 2950 ml   Net -2210 ml      ----------------------------------------------------------------------------------------------------------------------  Physical exam:  Constitutional:  older than stated age.  No acute distress.      HENT:  Head:  Normocephalic and atraumatic.  Mouth:  Moist mucous membranes.    Eyes:  Conjunctivae and EOM are normal. No scleral icterus.    Neck:  Neck supple.  No JVD present.    Cardiovascular:  Normal rate, regular rhythm and normal heart sounds with no murmur.  Pulmonary/Chest:  No respiratory distress, no wheezes, no crackles,on NC  Abdominal:  Soft. No distension and no tenderness.   Musculoskeletal:  No tenderness.  No red or swollen joints anywhere.    Neurological:  Alert and oriented to person, place.  No gross focal deficits     Skin:  Skin is warm and dry. No rash noted. No pallor.   Peripheral vascular:  No clubbing, no cyanosis, no edema.  Psychiatric: Appropriate mood and affect    Edited by: Primitivo Cho MD at 1/16/2024 1043  ----------------------------------------------------------------------------------------------------------------------  WBC/HGB/HCT/PLT   6.81/8.8/33.2/400 (01/16 0743)  BUN/CREAT/GLUC/ALT/AST/FINN/LIP    23/0.68/99/--/--/--/-- (01/16 0743)  LYTES - Na/K/Cl/CO2: 141/3.6/107/22.2 (01/16 0743)     Urine Culture   Date Value Ref Range Status   01/14/2024 25,000 CFU/mL Normal Urogenital Mariza  Final     Blood Culture   Date Value  Ref Range Status   01/13/2024 No growth at 2 days  Preliminary   01/13/2024 No growth at 2 days  Preliminary     I have personally looked at the labs and they are summarized above.  ----------------------------------------------------------------------------------------------------------------------  Detailed radiology reports for the last 24 hours:  XR Chest 1 View    Result Date: 1/15/2024   1. Bibasilar airspace disease    This report was finalized on 1/15/2024 9:45 AM by Dr. Hernesto Alaniz MD.     Assessment & Plan    #Sacral decubitus ulcer - General Surgery consulted bedside debridement performed during this hospitalization. Reportedly patient is not an appropriate candidate for wound VAC at this time.  Will continue wound care per wound care nurse practitioner recommendations.  LTAC consult currently pending.  Wound culture demonstrates E. coli and Proteus, has been on Flagyl and Rocephin per ID recommendations, see antibiotics changes below     #Acute on chronic osteomyelitis, now with concern for Pneumonia, Bacterial, treating for Aspiration - Continue Zosyn as per above per ID recommendations for new Pneumonia x 7 days.  Will likely require IV antibiotic therapy for 4 to 6 weeks total. Has midline, will need Ceftriaxone long term through 2/18 and Flagyl through 1/21 per Infectious Disease recommendations      #Severe sepsis (SHILPA) secondary to chronic sacral decubitus ulcer; Treatment as per above     #Chronic HFpEF/HFrEF - no evidence of exacerbation at this time     #Essential hypertension - well-controlled     #Type 2 diabetes mellitus - continue Accu-Cheks before every meal and nightly with sliding scale insulin     #Morbid obesity - complicates all aspects of care     #Mild confusion - resolved, was likely secondary to mild hospital associated delirium     F: Oral  E: Monitor & Replace as needed   N: Diet: Diabetic Diets; Consistent Carbohydrate; Texture: Regular Texture (IDDSI 7); Fluid Consistency:  Thin (IDDSI 0)  PPx: SQH  Code Status (Patient has no pulse and is not breathing): CPR  Medical Interventions (Patient has pulse or is breathing): Full Support     Dispo: Pending clinical improvement, pending LTACH approval  Edited by: Primitivo Cho MD at 1/16/2024 1043  [facility]

## 2024-01-16 NOTE — CASE MANAGEMENT/SOCIAL WORK
Continued Stay Note  MAKI Regalado     Patient Name: Ken Krueger  MRN: [id number]  Today's Date: 1/16/2024    Admit Date: 1/8/2024     Discharge Plan       Row Name 01/16/24 1248       Plan    Plan CM f/u with Hollie in [facility] and pre-auth still pending. CM spoke with pt's POA/brother Pineda. Pt lives at home with brother and mother and plans to return home if insurance does not approve [facility] admission. Pt does not have HH. Prefers VNA HH if needed. He has an aid 3 days a week for bathing.  Pt has hospital bed, alternating pressure pad, transfer board, patricio lift, Autopap and nebulizer via [employer]. Pt's brother commented the pressure pad was not helpful for his wounds. He would benefit from a low air loss mattress for his hospital bed. CM will arrange with MD order. Pt will need IV antibiotics thru 2/18 per ID note and pt's brother has done these in the past and will be able to again. He does not have a preference for infusion provider. CM will follow.    Patient/Family in Agreement with Plan yes               Meg Kevin RN

## 2024-01-16 NOTE — CONSULTS
Patient seen and examined.  Full note to follow.    Management plan discussed with the medical resident.    I have personally reviewed x-rays, labs, medication list.    Please refer to Dr. Deandra Ojeda's (second-year internal medicine resident) for further details.    Total time spent 35 minutes

## 2024-01-16 NOTE — PROGRESS NOTES
PROGRESS NOTE         Patient Identification:  Name:  Ken Krueger  Age:  48 y.o.  Sex:  male  :  1975  MRN:  [id number]  Visit Number:  [id number]  Primary Care Provider:  Franchesca Hodges APRN         LOS: 7 days       ----------------------------------------------------------------------------------------------------------------------  Subjective       Chief Complaints:    Wound Check (Pt states his primary sent him her for his bed sore on his buttock. )        Interval History:      The patient is resting quietly in bed, ill appearing this morning. Alert only to person, unable to answer other simple questions. On 3.5 L nasal cannula without any apparent distress. Lung exam remarkable for scattered rhonchi and wheezing bilaterally to auscultation. Abdomen is firm but nontender with hypoactive bowel sounds. Afebrile. No report of excess colostomy output. Wbc normal at 6.81. Urine culture from  finalized with 25,000 colonies normal urogenital sil.         Review of Systems:    RADHA due to confusion   ----------------------------------------------------------------------------------------------------------------------      Objective       Current Hospital Meds:  ARIPiprazole, 10 mg, Oral, Nightly  atorvastatin, 10 mg, Oral, Nightly  baclofen, 30 mg, Oral, 4x Daily With Meals & Nightly  bumetanide, 2 mg, Oral, Daily  carvedilol, 12.5 mg, Oral, BID With Meals  dantrolene, 100 mg, Oral, TID  DULoxetine, 60 mg, Oral, BID  ferrous sulfate, 325 mg, Oral, BID  gabapentin, 800 mg, Oral, Q8H  heparin (porcine), 5,000 Units, Subcutaneous, Q8H  insulin lispro, 2-7 Units, Subcutaneous, 4x Daily AC & at Bedtime  methenamine, 1 g, Oral, BID With Meals  metOLazone, 5 mg, Oral, Once per day on   modafinil, 200 mg, Oral, Daily  multivitamin with minerals, 1 tablet, Oral, Daily  pantoprazole, 40 mg, Oral, Q AM  piperacillin-tazobactam, 3.375 g, Intravenous, Q8H  potassium chloride, 10  mEq, Oral, Daily  sacubitril-valsartan, 1 tablet, Oral, BID  senna-docusate sodium, 2 tablet, Oral, BID  sodium chloride, 10 mL, Intravenous, Q12H  sodium hypochlorite, , Topical, BID  spironolactone, 25 mg, Oral, Daily           ----------------------------------------------------------------------------------------------------------------------    Vital Signs:  Temp:  [98 °F (36.7 °C)-98.6 °F (37 °C)] 98.6 °F (37 °C)  Heart Rate:  [70-85] 79  Resp:  [18-20] 18  BP: (106-118)/(60-84) 108/72  No data found.    SpO2 Percentage    01/15/24 0657 01/15/24 1911 01/15/24 1921   SpO2: 99% 96% 97%     SpO2:  [96 %-97 %] 97 %  on  Flow (L/min):  [2] 2;   Device (Oxygen Therapy): nasal cannula    Body mass index is 41.37 kg/m².  Wt Readings from Last 3 Encounters:   01/09/24 135 kg (296 lb 9.6 oz)   12/27/23 (!) 148 kg (326 lb)   12/14/23 (!) 148 kg (326 lb)        Intake/Output Summary (Last 24 hours) at 1/16/2024 1120  Last data filed at 1/16/2024 0900  Gross per 24 hour   Intake 640 ml   Output 2950 ml   Net -2310 ml     Diet: Diabetic Diets; Consistent Carbohydrate; Texture: Regular Texture (IDDSI 7); Fluid Consistency: Thin (IDDSI 0)  ----------------------------------------------------------------------------------------------------------------------      Physical Exam:    Constitutional: Generally ill-appearing.  Confused this morning. On 3.5L nasal cannula without any apparent distress   HENT:  Head: Normocephalic and atraumatic.  Mouth:  Moist mucous membranes.    Eyes:  Conjunctivae and EOM are normal.  No scleral icterus.  Neck:  Neck supple.  No JVD present.    Cardiovascular:  Normal rate, regular rhythm and normal heart sounds with no murmur. No edema.  Pulmonary/Chest: Scattered rhonchi and wheezing bilaterally to auscultation.  Abdominal:  Soft.  Bowel sounds are normal.  No distension and no tenderness.  Ostomy.  Musculoskeletal: Left AKA.  Functional paraplegia.  Neurological:  Alert and oriented to person,  "confused   Skin:  Skin is warm and dry.  No rash noted.  No pallor.  Sacral decubitus ulcer, left gluteal pressure ulcer with no evidence of active infection. Right lower gluteal ulcer with necrotic tissue present.  Wounds remain dressed, clean dry and intact           ----------------------------------------------------------------------------------------------------------------------          Results from last 7 days   Lab Units 01/16/24  1109   PH, ARTERIAL pH units 7.260*   PO2 ART mm Hg 88.0   PCO2, ARTERIAL mm Hg 63.4*   HCO3 ART mmol/L 28.4*     Results from last 7 days   Lab Units 01/16/24  0743 01/15/24  0243 01/13/24  0220   WBC 10*3/mm3 6.81 10.11 8.31   HEMOGLOBIN g/dL 8.8* 8.7* 8.0*   HEMATOCRIT % 33.2* 32.3* 29.5*   MCV fL 86.9 86.8 84.5   MCHC g/dL 26.5* 26.9* 27.1*   PLATELETS 10*3/mm3 400 392 422     Results from last 7 days   Lab Units 01/16/24  0743 01/15/24  0243 01/13/24  0220   SODIUM mmol/L 141 140 141   POTASSIUM mmol/L 3.6 3.9 3.8   CHLORIDE mmol/L 107 106 108*   CO2 mmol/L 22.2 22.1 21.8*   BUN mg/dL 23* 22* 19   CREATININE mg/dL 0.68* 0.72* 0.75*   CALCIUM mg/dL 8.8 9.0 8.6   GLUCOSE mg/dL 99 110* 129*   Estimated Creatinine Clearance: 190.5 mL/min (A) (by C-G formula based on SCr of 0.68 mg/dL (L)).  No results found for: \"AMMONIA\"      Glucose   Date/Time Value Ref Range Status   01/16/2024 0622 109 70 - 130 mg/dL Final   01/15/2024 2101 126 70 - 130 mg/dL Final   01/15/2024 1611 135 (H) 70 - 130 mg/dL Final   01/15/2024 1028 117 70 - 130 mg/dL Final   01/15/2024 0634 114 70 - 130 mg/dL Final   01/14/2024 2054 114 70 - 130 mg/dL Final   01/14/2024 1616 113 70 - 130 mg/dL Final   01/14/2024 1032 133 (H) 70 - 130 mg/dL Final     Lab Results   Component Value Date    HGBA1C 11.50 (H) 02/13/2023     Lab Results   Component Value Date    TSH 3.780 07/19/2022    FREET4 1.31 06/02/2021       Blood Culture   Date Value Ref Range Status   01/08/2024 No growth at 24 hours  Preliminary   01/08/2024 " "No growth at 24 hours  Preliminary     No results found for: \"URINECX\"  No results found for: \"WOUNDCX\"  No results found for: \"STOOLCX\"  No results found for: \"RESPCX\"  Pain Management Panel  More data exists         Latest Ref Rng & Units 1/13/2024 4/9/2023   Pain Management Panel   Creatinine, Urine mg/dL - 79.5    Amphetamine, Urine Qual Negative Negative  -   Barbiturates Screen, Urine Negative Negative  -   Benzodiazepine Screen, Urine Negative Negative  -   Buprenorphine, Screen, Urine Negative Negative  -   Cocaine Screen, Urine Negative Negative  -   Fentanyl, Urine Negative Negative  -   Methadone Screen , Urine Negative Negative  -   Methamphetamine, Ur Negative Negative  -         ----------------------------------------------------------------------------------------------------------------------  Imaging Results (Last 24 Hours)       ** No results found for the last 24 hours. **            ----------------------------------------------------------------------------------------------------------------------    Pertinent Infectious Disease Results      Assessment/Plan       Assessment     Severe sepsis with lactic acid greater than 2 on admission  Acute on chronic osteomyelitis        Plan      The patient is resting quietly in bed, ill appearing this morning. Alert only to person, unable to answer other simple questions. On 3.5 L nasal cannula without any apparent distress. Lung exam remarkable for scattered rhonchi and wheezing bilaterally to auscultation. Abdomen is firm but nontender with hypoactive bowel sounds. Afebrile. No report of excess colostomy output. Wbc normal at 6.81. Urine culture from 1/14 finalized with 25,000 colonies normal urogenital sil.     After new RLL infiltrate was found on chest x ray yesterday, the patient's antibiotic course was escalated to zosyn 3.375g IV every 8 hours for a course of 7 days for treatment of aspiration pneumonia. After zosyn course is finished, plan to " resume metronidazole 500 mg IV every 8 hours through 1/21 and ceftriaxone 2g IV every 24 hours as scheduled through 2/18/24 for sacral osteomyelitis. We will continue to monitor closely while awaiting LTAC referral.     ANTIMICROBIAL THERAPY    methenamine - 1 g, 1 g  piperacillin-tazobactam (ZOSYN) IVPB 3.375 g in 100 mL NS VTB     Code Status:   Code Status and Medical Interventions:   Ordered at: 01/09/24 0247     Code Status (Patient has no pulse and is not breathing):    CPR (Attempt to Resuscitate)     Medical Interventions (Patient has pulse or is breathing):    Full Support       Jacinta Wade, APRN  01/16/24  11:20 EST         n/a

## 2024-01-16 NOTE — PROGRESS NOTES
Antimicrobial Length of Therapy:    Zosyn day 2 of 7  Then will transition back to metronidazole 500 mg IV every 8 hours through 1/21 and ceftriaxone 2g IV every 24 hours as scheduled through 2/18/24    Yareli LuceroD  01/16/24  14:16 EST

## 2024-01-16 NOTE — CASE MANAGEMENT/SOCIAL WORK
Discharge Planning Assessment   [place]     Patient Name: Ken Krueger  MRN: [id number]  Today's Date: 1/16/2024    Admit Date: 1/8/2024     Discharge Plan       Row Name 01/16/24 1505       Plan    Plan SS per Rosemarie received call from [facility] and P2P option for LTACH has been provided. P2P must be completed by 4:00 CST today. SS attempted to contact [facility] back to schedule P2P and left a message. CM following.    Addendum @ 16:52: SS was informed by Dr. Neva ADRIAN that he contacted [facility] to complete p2p and had to leave a message. Dr. Hooks to inform SS/CM after p2p is completed. CM following.                  Expected Discharge Date and Time       Expected Discharge Date Expected Discharge Time    Jan 17, 2024         BRENT Burns

## 2024-01-16 NOTE — PLAN OF CARE
Goal Outcome Evaluation:  Plan of Care Reviewed With: patient        Progress: no change  Outcome Evaluation: Pt resting in bed at this time. Wound care completed per orders. Pt wearing Bipap per orders. Pt voices no concerns or complaints at this time. Will continue with plan of care.
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
## 2024-01-16 NOTE — CONSULTS
"Assessment:  Diagnosis: sacral wound    Allergies   Allergen Reactions    Keflex [Cephalexin] Rash     Patient states \"red man syndrome\"\  Patient has tolerated ceftriaxone and cefepime since this allergy was entered in Epic       Order Date/Time: 1/12/2024 1753  Indications: iv abx >30 days per ID note  LABS:  Lab Results   Component Value Date    INR 1.20 (H) 11/09/2022    PROTIME 15.4 (H) 11/09/2022     Lab Results   Component Value Date    PTT 33.0 11/09/2022     Lab Results   Component Value Date    WBC 6.81 01/16/2024    HGB 8.8 (L) 01/16/2024    HCT 33.2 (L) 01/16/2024    MCV 86.9 01/16/2024     01/16/2024     Lab Results   Component Value Date    BUN 23 (H) 01/16/2024     Lab Results   Component Value Date    CREATININE 0.68 (L) 01/16/2024     Lab Results   Component Value Date    EGFRIFNONA 115 10/29/2021     Labs Reviewed: all labs reviewed    Contraindications for PICC/Midline:  No contraindications noted    Recommendations:  PowerPICC Catheter with [place] 3CG Tip Positioning System (TPS) Stylet; 4 fr; single lumen  Upper Right Basilic    PICC Measurements:  Upper Arm Circumference: 39 cm  Initial Catheter Length: 43 cm  Internal Catheter Length: 42 cm  External Catheter Length: 1 cm    Procedure Time Out:  Time out Time: 1045  Correct Patient Identity: Yes  Correct Surgical Side and Site Are Marked: Yes  Agreement on Procedure to be done: Yes  Antibiotic Given: N/A  RN: JC Armstrong RN    [facility][phone]). Consent witnessed by Jaya Trujillo RN. PowerPICC placed with ultrasound guidance and placement verified with ECG. Sterile field maintained. Minimal blood loss noted. Flushes easily. Blood return noted. PowerPICC patient care booklet placed in pt chartlet.  Patient nurse, Jaya CHRISTIE, made aware that PICC is in upper R arm and ready for use.     Concepción Armstrong RN    "
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
## 2024-01-17 LAB
A-A DO2: 39.8 MMHG (ref 0–300)
ANION GAP SERPL CALCULATED.3IONS-SCNC: 9.6 MMOL/L (ref 5–15)
ARTERIAL PATENCY WRIST A: POSITIVE
ATMOSPHERIC PRESS: 732 MMHG
BASE EXCESS BLDA CALC-SCNC: 1.7 MMOL/L (ref 0–2)
BDY SITE: ABNORMAL
BUN SERPL-MCNC: 31 MG/DL (ref 6–20)
BUN/CREAT SERPL: 36.5 (ref 7–25)
CALCIUM SPEC-SCNC: 9 MG/DL (ref 8.6–10.5)
CHLORIDE SERPL-SCNC: 105 MMOL/L (ref 98–107)
CO2 BLDA-SCNC: 30.3 MMOL/L (ref 22–33)
CO2 SERPL-SCNC: 26.4 MMOL/L (ref 22–29)
COHGB MFR BLD: 1.9 % (ref 0–5)
CREAT SERPL-MCNC: 0.85 MG/DL (ref 0.76–1.27)
DEPRECATED RDW RBC AUTO: 58.9 FL (ref 37–54)
EGFRCR SERPLBLD CKD-EPI 2021: 108.5 ML/MIN/1.73
EPAP: 14
ERYTHROCYTE [DISTWIDTH] IN BLOOD BY AUTOMATED COUNT: 18.6 % (ref 12.3–15.4)
GLUCOSE BLDC GLUCOMTR-MCNC: 112 MG/DL (ref 70–130)
GLUCOSE BLDC GLUCOMTR-MCNC: 131 MG/DL (ref 70–130)
GLUCOSE BLDC GLUCOMTR-MCNC: 137 MG/DL (ref 70–130)
GLUCOSE BLDC GLUCOMTR-MCNC: 97 MG/DL (ref 70–130)
GLUCOSE SERPL-MCNC: 105 MG/DL (ref 65–99)
HCO3 BLDA-SCNC: 28.6 MMOL/L (ref 20–26)
HCT VFR BLD AUTO: 33.8 % (ref 37.5–51)
HCT VFR BLD CALC: 26 % (ref 38–51)
HGB BLD-MCNC: 8.8 G/DL (ref 13–17.7)
HGB BLDA-MCNC: 8.5 G/DL (ref 14–18)
INHALED O2 CONCENTRATION: 30 %
IPAP: 20
Lab: ABNORMAL
MCH RBC QN AUTO: 23.3 PG (ref 26.6–33)
MCHC RBC AUTO-ENTMCNC: 26 G/DL (ref 31.5–35.7)
MCV RBC AUTO: 89.4 FL (ref 79–97)
METHGB BLD QL: 0 % (ref 0–3)
MODALITY: ABNORMAL
OXYHGB MFR BLDV: 96.5 % (ref 94–99)
PCO2 BLDA: 57.3 MM HG (ref 35–45)
PCO2 TEMP ADJ BLD: ABNORMAL MM[HG]
PH BLDA: 7.31 PH UNITS (ref 7.35–7.45)
PH, TEMP CORRECTED: ABNORMAL
PLATELET # BLD AUTO: 353 10*3/MM3 (ref 140–450)
PMV BLD AUTO: 9 FL (ref 6–12)
PO2 BLDA: 102 MM HG (ref 83–108)
PO2 TEMP ADJ BLD: ABNORMAL MM[HG]
POTASSIUM SERPL-SCNC: 3.8 MMOL/L (ref 3.5–5.2)
RBC # BLD AUTO: 3.78 10*6/MM3 (ref 4.14–5.8)
SAO2 % BLDCOA: 98.4 % (ref 94–99)
SET MECH RESP RATE: 20
SODIUM SERPL-SCNC: 141 MMOL/L (ref 136–145)
VENTILATOR MODE: ABNORMAL
WBC NRBC COR # BLD AUTO: 7.4 10*3/MM3 (ref 3.4–10.8)

## 2024-01-17 PROCEDURE — 99233 SBSQ HOSP IP/OBS HIGH 50: CPT | Performed by: INTERNAL MEDICINE

## 2024-01-17 PROCEDURE — 25010000002 PIPERACILLIN SOD-TAZOBACTAM PER 1 G: Performed by: INTERNAL MEDICINE

## 2024-01-17 PROCEDURE — 94660 CPAP INITIATION&MGMT: CPT

## 2024-01-17 PROCEDURE — 99232 SBSQ HOSP IP/OBS MODERATE 35: CPT | Performed by: INTERNAL MEDICINE

## 2024-01-17 PROCEDURE — 80048 BASIC METABOLIC PNL TOTAL CA: CPT | Performed by: INTERNAL MEDICINE

## 2024-01-17 PROCEDURE — 82948 REAGENT STRIP/BLOOD GLUCOSE: CPT

## 2024-01-17 PROCEDURE — 94761 N-INVAS EAR/PLS OXIMETRY MLT: CPT

## 2024-01-17 PROCEDURE — 36600 WITHDRAWAL OF ARTERIAL BLOOD: CPT

## 2024-01-17 PROCEDURE — 94664 DEMO&/EVAL PT USE INHALER: CPT

## 2024-01-17 PROCEDURE — 82375 ASSAY CARBOXYHB QUANT: CPT

## 2024-01-17 PROCEDURE — B54MZZA ULTRASONOGRAPHY OF RIGHT UPPER EXTREMITY VEINS, GUIDANCE: ICD-10-PCS | Performed by: INTERNAL MEDICINE

## 2024-01-17 PROCEDURE — 83050 HGB METHEMOGLOBIN QUAN: CPT

## 2024-01-17 PROCEDURE — 94799 UNLISTED PULMONARY SVC/PX: CPT

## 2024-01-17 PROCEDURE — 25010000002 HEPARIN (PORCINE) PER 1000 UNITS: Performed by: INTERNAL MEDICINE

## 2024-01-17 PROCEDURE — 85027 COMPLETE CBC AUTOMATED: CPT | Performed by: INTERNAL MEDICINE

## 2024-01-17 PROCEDURE — 82805 BLOOD GASES W/O2 SATURATION: CPT

## 2024-01-17 RX ORDER — GUAIFENESIN 600 MG/1
600 TABLET, EXTENDED RELEASE ORAL EVERY 12 HOURS SCHEDULED
Status: DISCONTINUED | OUTPATIENT
Start: 2024-01-17 | End: 2024-01-19 | Stop reason: HOSPADM

## 2024-01-17 RX ADMIN — ATORVASTATIN CALCIUM 10 MG: 10 TABLET, FILM COATED ORAL at 20:55

## 2024-01-17 RX ADMIN — BACLOFEN 30 MG: 10 TABLET ORAL at 20:55

## 2024-01-17 RX ADMIN — IPRATROPIUM BROMIDE AND ALBUTEROL SULFATE 3 ML: 2.5; .5 SOLUTION RESPIRATORY (INHALATION) at 14:03

## 2024-01-17 RX ADMIN — METHENAMINE HIPPURATE 1 G: 1000 TABLET ORAL at 08:25

## 2024-01-17 RX ADMIN — POTASSIUM CHLORIDE 10 MEQ: 20 TABLET, EXTENDED RELEASE ORAL at 08:25

## 2024-01-17 RX ADMIN — DULOXETINE HYDROCHLORIDE 60 MG: 60 CAPSULE, DELAYED RELEASE ORAL at 08:24

## 2024-01-17 RX ADMIN — BACLOFEN 30 MG: 10 TABLET ORAL at 08:23

## 2024-01-17 RX ADMIN — PANTOPRAZOLE SODIUM 40 MG: 40 TABLET, DELAYED RELEASE ORAL at 05:00

## 2024-01-17 RX ADMIN — DANTROLENE SODIUM 100 MG: 25 CAPSULE ORAL at 08:25

## 2024-01-17 RX ADMIN — Medication 10 ML: at 08:27

## 2024-01-17 RX ADMIN — GABAPENTIN 400 MG: 400 CAPSULE ORAL at 20:55

## 2024-01-17 RX ADMIN — FERROUS SULFATE TAB 325 MG (65 MG ELEMENTAL FE) 325 MG: 325 (65 FE) TAB at 08:22

## 2024-01-17 RX ADMIN — PIPERACILLIN SODIUM AND TAZOBACTAM SODIUM 3.38 G: 3; .375 INJECTION, POWDER, LYOPHILIZED, FOR SOLUTION INTRAVENOUS at 01:04

## 2024-01-17 RX ADMIN — HEPARIN SODIUM 5000 UNITS: 5000 INJECTION INTRAVENOUS; SUBCUTANEOUS at 14:22

## 2024-01-17 RX ADMIN — DOCUSATE SODIUM 50 MG AND SENNOSIDES 8.6 MG 2 TABLET: 8.6; 5 TABLET, FILM COATED ORAL at 20:55

## 2024-01-17 RX ADMIN — FERROUS SULFATE TAB 325 MG (65 MG ELEMENTAL FE) 325 MG: 325 (65 FE) TAB at 20:55

## 2024-01-17 RX ADMIN — IPRATROPIUM BROMIDE AND ALBUTEROL SULFATE 3 ML: 2.5; .5 SOLUTION RESPIRATORY (INHALATION) at 00:00

## 2024-01-17 RX ADMIN — GUAIFENESIN 600 MG: 600 TABLET, EXTENDED RELEASE ORAL at 22:01

## 2024-01-17 RX ADMIN — METHENAMINE HIPPURATE 1 G: 1000 TABLET ORAL at 17:20

## 2024-01-17 RX ADMIN — ARIPIPRAZOLE 10 MG: 10 TABLET ORAL at 20:55

## 2024-01-17 RX ADMIN — DOCUSATE SODIUM 50 MG AND SENNOSIDES 8.6 MG 2 TABLET: 8.6; 5 TABLET, FILM COATED ORAL at 08:21

## 2024-01-17 RX ADMIN — Medication 10 ML: at 20:56

## 2024-01-17 RX ADMIN — DANTROLENE SODIUM 100 MG: 25 CAPSULE ORAL at 22:01

## 2024-01-17 RX ADMIN — HEPARIN SODIUM 5000 UNITS: 5000 INJECTION INTRAVENOUS; SUBCUTANEOUS at 21:00

## 2024-01-17 RX ADMIN — DAKIN'S SOLUTION 0.125% (QUARTER STRENGTH): 0.12 SOLUTION at 08:28

## 2024-01-17 RX ADMIN — BACLOFEN 30 MG: 10 TABLET ORAL at 17:21

## 2024-01-17 RX ADMIN — MODAFINIL 200 MG: 100 TABLET ORAL at 08:36

## 2024-01-17 RX ADMIN — SACUBITRIL AND VALSARTAN 1 TABLET: 24; 26 TABLET, FILM COATED ORAL at 20:55

## 2024-01-17 RX ADMIN — Medication 10 ML: at 20:55

## 2024-01-17 RX ADMIN — IPRATROPIUM BROMIDE AND ALBUTEROL SULFATE 3 ML: 2.5; .5 SOLUTION RESPIRATORY (INHALATION) at 18:22

## 2024-01-17 RX ADMIN — PIPERACILLIN SODIUM AND TAZOBACTAM SODIUM 3.38 G: 3; .375 INJECTION, POWDER, LYOPHILIZED, FOR SOLUTION INTRAVENOUS at 08:30

## 2024-01-17 RX ADMIN — HEPARIN SODIUM 5000 UNITS: 5000 INJECTION INTRAVENOUS; SUBCUTANEOUS at 05:01

## 2024-01-17 RX ADMIN — GABAPENTIN 400 MG: 400 CAPSULE ORAL at 08:19

## 2024-01-17 RX ADMIN — Medication 1 TABLET: at 08:21

## 2024-01-17 RX ADMIN — MUPIROCIN 1 APPLICATION: 20 OINTMENT TOPICAL at 08:28

## 2024-01-17 RX ADMIN — DULOXETINE HYDROCHLORIDE 60 MG: 60 CAPSULE, DELAYED RELEASE ORAL at 20:55

## 2024-01-17 RX ADMIN — BACLOFEN 30 MG: 10 TABLET ORAL at 12:24

## 2024-01-17 RX ADMIN — DANTROLENE SODIUM 100 MG: 25 CAPSULE ORAL at 16:36

## 2024-01-17 RX ADMIN — PIPERACILLIN SODIUM AND TAZOBACTAM SODIUM 3.38 G: 3; .375 INJECTION, POWDER, LYOPHILIZED, FOR SOLUTION INTRAVENOUS at 16:36

## 2024-01-17 RX ADMIN — MUPIROCIN 1 APPLICATION: 20 OINTMENT TOPICAL at 20:55

## 2024-01-17 RX ADMIN — IPRATROPIUM BROMIDE AND ALBUTEROL SULFATE 3 ML: 2.5; .5 SOLUTION RESPIRATORY (INHALATION) at 07:14

## 2024-01-17 NOTE — CASE MANAGEMENT/SOCIAL WORK
Continued Stay Note   Fabricio     Patient Name: Ken Krueger  MRN: [id number]  Today's Date: 1/17/2024    Admit Date: 1/8/2024    Plan: [facility] in [facility] is applying for an appeal.   Discharge Plan       Row Name 01/17/24 1500       Plan    Plan Hollie in [facility] is applying for an appeal.                 Laurence Gonzalez RN

## 2024-01-17 NOTE — PROGRESS NOTES
Enter Query Response Below      Query Response: unable to determine            If applicable, please update the problem list.   Patient: Ken Krueger        : 1975  Account: [id number]           Admit Date:         How to Respond to this query:       a. Click New Note     b. Answer query within the yellow box.                c. Update the Problem List, if applicable.      If you have any questions about this query contact me at: [email or web address]     Dr. Cho,     48yr male with sepsis and acute hypercarbic respirtory failure treated with IV antibiotics and Bipap.     Please clarify the following:    Acute hyper carbic Respiratory due to sepsis  Acute hyper carbic Respiratory due to _______  Other- specify_______  Unable to determine    By submitting this query, we are merely seeking further clarification of documentation to accurately reflect all conditions that you are monitoring, evaluating, treating or that extend the hospitalization or utilize additional resources of care. Please utilize your independent clinical judgment when addressing the question(s) above.     This query and your response, once completed, will be entered into the legal medical record.    Sincerely,  Adrienne BOWER Rn  Clinical Documentation Integrity Program

## 2024-01-17 NOTE — CONSULTS
Progress Note Pulmonary      Subjective     Interval History:   Mr Krueger had no acute events overnight, this morning he says that he feels his breathing is better. Also says that he feels that he is more alert and less confused than yesterday.      Review of Systems:    Reviewed ; unchanged       Vital Signs  Temp:  [97.4 °F (36.3 °C)-98.2 °F (36.8 °C)] 97.4 °F (36.3 °C)  Heart Rate:  [61-95] 76  Resp:  [18-24] 24  BP: ()/(54-82) 102/56  Body mass index is 41.37 kg/m².    Intake/Output Summary (Last 24 hours) at 1/17/2024 1200  Last data filed at 1/17/2024 0500  Gross per 24 hour   Intake 0 ml   Output 1650 ml   Net -1650 ml     No intake/output data recorded.    Physical Exam:  General- normal in appearance, not in any acute distress    HEENT- pupils equally reactive to light, normal in size, no scleral icterus    Neck- supple    No visible JVD but difficult to assess due to body habitus, no carotid bruit    Respiratory- bilateral rhonchi, more so on the right than the left    Cardiovascular-  Normal S1 and S2. No S3, S4 or murmurs.    GI-nontender nondistended bowel sounds positive    CNS-alert oriented x3, grossly nonfocal    Extremities- pulses normal bilaterally , no clubbing and edema        Results Review:      Results from last 7 days   Lab Units 01/17/24  0235 01/16/24  0743 01/15/24  0243   WBC 10*3/mm3 7.40 6.81 10.11   HEMOGLOBIN g/dL 8.8* 8.8* 8.7*   PLATELETS 10*3/mm3 353 400 392     Results from last 7 days   Lab Units 01/17/24  0235 01/16/24  0743 01/15/24  0243   SODIUM mmol/L 141 141 140   POTASSIUM mmol/L 3.8 3.6 3.9   CHLORIDE mmol/L 105 107 106   CO2 mmol/L 26.4 22.2 22.1   BUN mg/dL 31* 23* 22*   CREATININE mg/dL 0.85 0.68* 0.72*   CALCIUM mg/dL 9.0 8.8 9.0   GLUCOSE mg/dL 105* 99 110*     Lab Results   Component Value Date    INR 1.20 (H) 11/09/2022    INR 1.00 10/29/2021    INR 1.12 (H) 09/10/2018    PROTIME 15.4 (H) 11/09/2022    PROTIME 13.6 10/29/2021    PROTIME 14.6 09/10/2018        "    Invalid input(s): \"PROT\", \"LABALBU\"  Results from last 7 days   Lab Units 01/17/24  0759   PH, ARTERIAL pH units 7.306*   PO2 ART mm Hg 102.0   PCO2, ARTERIAL mm Hg 57.3*   HCO3 ART mmol/L 28.6*     Imaging Results (Last 24 Hours)       ** No results found for the last 24 hours. **                 ARIPiprazole, 10 mg, Oral, Nightly  atorvastatin, 10 mg, Oral, Nightly  baclofen, 30 mg, Oral, 4x Daily With Meals & Nightly  bumetanide, 2 mg, Oral, Daily  carvedilol, 12.5 mg, Oral, BID With Meals  dantrolene, 100 mg, Oral, TID  DULoxetine, 60 mg, Oral, BID  ferrous sulfate, 325 mg, Oral, BID  gabapentin, 400 mg, Oral, Q12H  heparin (porcine), 5,000 Units, Subcutaneous, Q8H  insulin lispro, 2-7 Units, Subcutaneous, 4x Daily AC & at Bedtime  ipratropium-albuterol, 3 mL, Nebulization, 4x Daily - RT  methenamine, 1 g, Oral, BID With Meals  metOLazone, 5 mg, Oral, Once per day on Monday Wednesday Friday  modafinil, 200 mg, Oral, Daily  multivitamin with minerals, 1 tablet, Oral, Daily  mupirocin, 1 application , Each Nare, BID  pantoprazole, 40 mg, Oral, Q AM  piperacillin-tazobactam, 3.375 g, Intravenous, Q8H  potassium chloride, 10 mEq, Oral, Daily  sacubitril-valsartan, 1 tablet, Oral, BID  senna-docusate sodium, 2 tablet, Oral, BID  sodium chloride, 10 mL, Intravenous, Q12H  sodium chloride, 10 mL, Intravenous, Q12H  sodium hypochlorite, , Topical, BID  spironolactone, 25 mg, Oral, Daily           Medication Review:     Assessment & Plan   # Acute hypercapnic respiratory acidosis   # Severe ARLIN on CPAP  # Suspected obesity hypoventilation syndrome   # HFrEF, 21-25%  # morbid obesity  - abg this am relatively unchanged from yesterday, however pt seems more alert and interactive than he reportedly was yesterday; possibly his decreased mental status is more related to his medications than his hypercapnia  - pt has his home nippv, recommend using that instead of bipap; if pt is unable to tolerate his home nippv, please " reach out to Dr Ramsay    Further recommendations per Dr Devendra Caceres, DO  01/17/24  12:00 EST  This is a progress note.  Initial consultation has already been dictated yesterday

## 2024-01-17 NOTE — PROGRESS NOTES
[facility] HOSPITALIST PROGRESS NOTE     Patient Identification:  Name:  Ken Krueger  Age:  48 y.o.  Sex:  male  :  1975  MRN:  [id number]  Visit Number:  [id number]  ROOM: [address] [place] [address]     Primary Care Provider:  Franchesca Hodges APRN    Length of stay in inpatient status:  8    Subjective     Chief Compliant:    Chief Complaint   Patient presents with    Wound Check     Pt states his primary sent him her for his bed sore on his buttock.      History of Presenting Illness:    Patient remains ill but stable today, no acute events overnight, no new complaints, denies any fevers or chills, yesterday was lethargic, checked ABG and retaining C02, had to place on Bipap, Pulmonary consulted and following, today still on Bipap, repeat ABG improved but still retaining, will let come off to eat but back on until acidosis is corrected, continued on antibiotics, Infectious Disease consulted and following as well, pending LTACH referral.   Objective     Current Hospital Meds:  ARIPiprazole, 10 mg, Oral, Nightly  atorvastatin, 10 mg, Oral, Nightly  baclofen, 30 mg, Oral, 4x Daily With Meals & Nightly  bumetanide, 2 mg, Oral, Daily  carvedilol, 12.5 mg, Oral, BID With Meals  dantrolene, 100 mg, Oral, TID  DULoxetine, 60 mg, Oral, BID  ferrous sulfate, 325 mg, Oral, BID  gabapentin, 400 mg, Oral, Q12H  heparin (porcine), 5,000 Units, Subcutaneous, Q8H  insulin lispro, 2-7 Units, Subcutaneous, 4x Daily AC & at Bedtime  ipratropium-albuterol, 3 mL, Nebulization, 4x Daily - RT  methenamine, 1 g, Oral, BID With Meals  metOLazone, 5 mg, Oral, Once per day on   modafinil, 200 mg, Oral, Daily  multivitamin with minerals, 1 tablet, Oral, Daily  mupirocin, 1 application , Each Nare, BID  pantoprazole, 40 mg, Oral, Q AM  piperacillin-tazobactam, 3.375 g, Intravenous, Q8H  potassium chloride, 10 mEq, Oral, Daily  sacubitril-valsartan, 1 tablet, Oral, BID  senna-docusate sodium, 2 tablet, Oral,  BID  sodium chloride, 10 mL, Intravenous, Q12H  sodium chloride, 10 mL, Intravenous, Q12H  sodium hypochlorite, , Topical, BID  spironolactone, 25 mg, Oral, Daily    ----------------------------------------------------------------------------------------------------------------------  Vital Signs:  Temp:  [97.4 °F (36.3 °C)-98.2 °F (36.8 °C)] 97.4 °F (36.3 °C)  Heart Rate:  [61-95] 76  Resp:  [18-24] 24  BP: ()/(54-82) 102/56  SpO2:  [94 %-98 %] 98 %  on  Flow (L/min):  [2-60] 60;   Device (Oxygen Therapy): heated;high-flow nasal cannula  Body mass index is 41.37 kg/m².      Intake/Output Summary (Last 24 hours) at 1/17/2024 0932  Last data filed at 1/17/2024 0500  Gross per 24 hour   Intake 0 ml   Output 1650 ml   Net -1650 ml      ----------------------------------------------------------------------------------------------------------------------  Physical exam:  Constitutional:  older than stated age.  No acute distress.      HENT:  Head:  Normocephalic and atraumatic.  Mouth:  Moist mucous membranes.    Eyes:  Conjunctivae and EOM are normal. No scleral icterus.    Neck:  Neck supple.  No JVD present.    Cardiovascular:  Normal rate, regular rhythm and normal heart sounds with no murmur.  Pulmonary/Chest:  No respiratory distress, no wheezes, no crackles, on Bipap  Abdominal:  Soft. No distension and no tenderness.   Musculoskeletal:  No tenderness.  No red or swollen joints anywhere.    Neurological:  Alert and oriented to person, place.  No gross focal deficits     Skin:  Skin is warm and dry. No rash noted. No pallor.   Peripheral vascular:  No clubbing, no cyanosis, no edema.  Psychiatric: Appropriate mood and affect    Edited by: Primitivo Cho MD at 1/17/2024 0932  ----------------------------------------------------------------------------------------------------------------------  WBC/HGB/HCT/PLT   7.40/8.8/33.8/353 (01/17 0235)  BUN/CREAT/GLUC/ALT/AST/FINN/LIP    31/0.85/105/--/--/--/-- (01/17  0235)  LYTES - Na/K/Cl/CO2: 141/3.8/105/26.4 (01/17 0235)  pH/pCO2/pO2/HCO3   7.306/57.3/102.0/28.6 (01/17 0759)  Urine Culture   Date Value Ref Range Status   01/14/2024 25,000 CFU/mL Normal Urogenital Mariza  Final     Blood Culture   Date Value Ref Range Status   01/13/2024 No growth at 3 days  Preliminary   01/13/2024 No growth at 3 days  Preliminary     I have personally looked at the labs and they are summarized above.  ----------------------------------------------------------------------------------------------------------------------  Detailed radiology reports for the last 24 hours:  No radiology results for the last day  Assessment & Plan    #Sacral decubitus ulcer - General Surgery consulted bedside debridement performed during this hospitalization. Reportedly patient is not an appropriate candidate for wound VAC at this time.  Will continue wound care per wound care nurse practitioner recommendations.  LTAC consult currently pending.  Wound culture demonstrates E. coli and Proteus, has been on Flagyl and Rocephin per ID recommendations, see antibiotics changes below     #Acute on chronic osteomyelitis, now with concern for Pneumonia, Bacterial, treating for Aspiration - Continue Zosyn as per above per ID recommendations for new Pneumonia x 7 days.  Will likely require IV antibiotic therapy for 4 to 6 weeks total. Has midline, will need Ceftriaxone long term through 2/18 and Flagyl through 1/21 per Infectious Disease recommendations, LTACH referral pending    #Acute Hypercarbic Respiratory Failure requiring Non-invasive Positive Pressure Ventilation    - Pulmonary consulted and following   - Continue Bipap, repeat ABG improved but still retaining C02, continue for now and as needed thereafter     #Severe sepsis (SHILPA) secondary to chronic sacral decubitus ulcer; Treatment as per above     #Chronic HFpEF/HFrEF - no evidence of exacerbation at this time     #Essential hypertension - well-controlled     #Type  2 diabetes mellitus - continue Accu-Cheks before every meal and nightly with sliding scale insulin     #Morbid obesity - complicates all aspects of care     #Mild confusion - resolved, was likely secondary to mild hospital associated delirium     F: Oral  E: Monitor & Replace as needed   N: Diet: Diabetic Diets; Consistent Carbohydrate; Texture: Regular Texture (IDDSI 7); Fluid Consistency: Thin (IDDSI 0)  PPx: SQH  Code Status (Patient has no pulse and is not breathing): CPR  Medical Interventions (Patient has pulse or is breathing): Full Support     Dispo: Pending clinical improvement, pending LTACH approval    *This patient is high risk due to osteomyelitis, Pneumonia, Acute Hypercarbic Respiratory Failure requiring Non-invasive Positive Pressure Ventilation    Edited by: Primitivo Cho MD at 1/17/2024 0949  [facility]

## 2024-01-17 NOTE — PLAN OF CARE
Goal Outcome Evaluation:              Outcome Evaluation: Pt rested in bed this shift. Wound care completed as ordered. Pt wearing Bipap as ordered. No new complaints or concerns, VSS and will continue with POC.

## 2024-01-17 NOTE — PLAN OF CARE
Goal Outcome Evaluation:           Progress: no change  Outcome Evaluation: Patient is in bed at this time. WC completed per orders. Pt has been on bipap today besides meals. No complaints or concerns per pt at this time. Pt is at 100% on HHF at this time. Will continue POC.
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
## 2024-01-17 NOTE — PROGRESS NOTES
PROGRESS NOTE         Patient Identification:  Name:  Ken Krueger  Age:  48 y.o.  Sex:  male  :  1975  MRN:  [id number]  Visit Number:  [id number]  Primary Care Provider:  Franchesca Hodges APRN         LOS: 8 days       ----------------------------------------------------------------------------------------------------------------------  Subjective       Chief Complaints:    Wound Check (Pt states his primary sent him her for his bed sore on his buttock. )        Interval History:      The patient is awake and alert this morning, less confused.  On BiPAP at 30% FiO2 no apparent distress.  Afebrile.  No reports of excessive colostomy output.  Lung exam is diminished to auscultation bilaterally.  Abdomen is soft and nontender with normoactive bowel sounds.  WBC normal at 7.40.        Review of Systems:    RADHA due to confusion   ----------------------------------------------------------------------------------------------------------------------      Objective       Current Hospital Meds:  ARIPiprazole, 10 mg, Oral, Nightly  atorvastatin, 10 mg, Oral, Nightly  baclofen, 30 mg, Oral, 4x Daily With Meals & Nightly  bumetanide, 2 mg, Oral, Daily  carvedilol, 12.5 mg, Oral, BID With Meals  dantrolene, 100 mg, Oral, TID  DULoxetine, 60 mg, Oral, BID  ferrous sulfate, 325 mg, Oral, BID  gabapentin, 400 mg, Oral, Q12H  heparin (porcine), 5,000 Units, Subcutaneous, Q8H  insulin lispro, 2-7 Units, Subcutaneous, 4x Daily AC & at Bedtime  ipratropium-albuterol, 3 mL, Nebulization, 4x Daily - RT  methenamine, 1 g, Oral, BID With Meals  metOLazone, 5 mg, Oral, Once per day on   modafinil, 200 mg, Oral, Daily  multivitamin with minerals, 1 tablet, Oral, Daily  mupirocin, 1 application , Each Nare, BID  pantoprazole, 40 mg, Oral, Q AM  piperacillin-tazobactam, 3.375 g, Intravenous, Q8H  potassium chloride, 10 mEq, Oral, Daily  sacubitril-valsartan, 1 tablet, Oral, BID  senna-docusate sodium,  2 tablet, Oral, BID  sodium chloride, 10 mL, Intravenous, Q12H  sodium chloride, 10 mL, Intravenous, Q12H  sodium hypochlorite, , Topical, BID  spironolactone, 25 mg, Oral, Daily           ----------------------------------------------------------------------------------------------------------------------    Vital Signs:  Temp:  [97.4 °F (36.3 °C)-98.2 °F (36.8 °C)] 97.4 °F (36.3 °C)  Heart Rate:  [61-95] 76  Resp:  [18-24] 24  BP: ()/(54-82) 102/56  No data found.    SpO2 Percentage    01/17/24 0600 01/17/24 0714 01/17/24 0800   SpO2: 94% 95% 98%     SpO2:  [94 %-98 %] 98 %  on  Flow (L/min):  [2-60] 60;   Device (Oxygen Therapy): heated;high-flow nasal cannula    Body mass index is 41.37 kg/m².  Wt Readings from Last 3 Encounters:   01/09/24 135 kg (296 lb 9.6 oz)   12/27/23 (!) 148 kg (326 lb)   12/14/23 (!) 148 kg (326 lb)        Intake/Output Summary (Last 24 hours) at 1/17/2024 0908  Last data filed at 1/17/2024 0500  Gross per 24 hour   Intake 0 ml   Output 1650 ml   Net -1650 ml     Diet: Diabetic Diets; Consistent Carbohydrate; Texture: Regular Texture (IDDSI 7); Fluid Consistency: Thin (IDDSI 0)  ----------------------------------------------------------------------------------------------------------------------      Physical Exam:    Constitutional: Generally ill-appearing.  More awake and alert, less confused.  On BiPAP at 30% FiO2 with no apparent distress.    HENT:  Head: Normocephalic and atraumatic.  Mouth:  Moist mucous membranes.    Eyes:  Conjunctivae and EOM are normal.  No scleral icterus.  Neck:  Neck supple.  No JVD present.    Cardiovascular:  Normal rate, regular rhythm and normal heart sounds with no murmur. No edema.  Pulmonary/Chest: Lung exam is diminished to auscultation bilaterally.  Abdominal:  Soft.  Bowel sounds are normal.  No distension and no tenderness.  Ostomy.  Musculoskeletal: Left AKA.  Functional paraplegia.  Neurological:  Alert and oriented to person, confused  "  Skin:  Skin is warm and dry.  No rash noted.  No pallor.  Sacral decubitus ulcer, left gluteal pressure ulcer with no evidence of active infection. Right lower gluteal ulcer with necrotic tissue present.  Wounds are dressed, clean dry and intact.        ----------------------------------------------------------------------------------------------------------------------          Results from last 7 days   Lab Units 01/17/24  0759   PH, ARTERIAL pH units 7.306*   PO2 ART mm Hg 102.0   PCO2, ARTERIAL mm Hg 57.3*   HCO3 ART mmol/L 28.6*     Results from last 7 days   Lab Units 01/17/24 0235 01/16/24  0743 01/15/24  0243   WBC 10*3/mm3 7.40 6.81 10.11   HEMOGLOBIN g/dL 8.8* 8.8* 8.7*   HEMATOCRIT % 33.8* 33.2* 32.3*   MCV fL 89.4 86.9 86.8   MCHC g/dL 26.0* 26.5* 26.9*   PLATELETS 10*3/mm3 353 400 392     Results from last 7 days   Lab Units 01/17/24 0235 01/16/24  0743 01/15/24  0243   SODIUM mmol/L 141 141 140   POTASSIUM mmol/L 3.8 3.6 3.9   CHLORIDE mmol/L 105 107 106   CO2 mmol/L 26.4 22.2 22.1   BUN mg/dL 31* 23* 22*   CREATININE mg/dL 0.85 0.68* 0.72*   CALCIUM mg/dL 9.0 8.8 9.0   GLUCOSE mg/dL 105* 99 110*   Estimated Creatinine Clearance: 152.4 mL/min (by C-G formula based on SCr of 0.85 mg/dL).  No results found for: \"AMMONIA\"      Glucose   Date/Time Value Ref Range Status   01/17/2024 0636 137 (H) 70 - 130 mg/dL Final   01/16/2024 2005 106 70 - 130 mg/dL Final   01/16/2024 1640 117 70 - 130 mg/dL Final   01/16/2024 0622 109 70 - 130 mg/dL Final   01/15/2024 2101 126 70 - 130 mg/dL Final   01/15/2024 1611 135 (H) 70 - 130 mg/dL Final   01/15/2024 1028 117 70 - 130 mg/dL Final   01/15/2024 0634 114 70 - 130 mg/dL Final     Lab Results   Component Value Date    HGBA1C 11.50 (H) 02/13/2023     Lab Results   Component Value Date    TSH 3.780 07/19/2022    FREET4 1.31 06/02/2021       Blood Culture   Date Value Ref Range Status   01/08/2024 No growth at 24 hours  Preliminary   01/08/2024 No growth at 24 hours  " "Preliminary     No results found for: \"URINECX\"  No results found for: \"WOUNDCX\"  No results found for: \"STOOLCX\"  No results found for: \"RESPCX\"  Pain Management Panel  More data exists         Latest Ref Rng & Units 1/13/2024 4/9/2023   Pain Management Panel   Creatinine, Urine mg/dL - 79.5    Amphetamine, Urine Qual Negative Negative  -   Barbiturates Screen, Urine Negative Negative  -   Benzodiazepine Screen, Urine Negative Negative  -   Buprenorphine, Screen, Urine Negative Negative  -   Cocaine Screen, Urine Negative Negative  -   Fentanyl, Urine Negative Negative  -   Methadone Screen , Urine Negative Negative  -   Methamphetamine, Ur Negative Negative  -         ----------------------------------------------------------------------------------------------------------------------  Imaging Results (Last 24 Hours)       ** No results found for the last 24 hours. **            ----------------------------------------------------------------------------------------------------------------------    Pertinent Infectious Disease Results      Assessment/Plan       Assessment     Severe sepsis with lactic acid greater than 2 on admission  Acute on chronic osteomyelitis        Plan      I saw and examined the patient myself this morning with GUANACO Winston and discussed the plan of care with her and primary RN and here are my findings:    The patient is awake and alert this morning, showing improved cognitive clarity. Currently on BiPAP at 30% FiO2, the patient exhibits no apparent distress and is afebrile. There are no reports of excessive colostomy output. The lung exam indicates diminished breath sounds bilaterally, while the abdomen remains soft, nontender, with normoactive bowel sounds. The white blood cell count is normal at 7.40.    The recommendation is to continue the course of Zosyn at 3.375 g IV every 8 hours for a total of 7 days, with a tentative end date of 1/21/2024. Once the Zosyn course is " completed, the plan is to resume ceftriaxone at 2 g IV every 24 hours as scheduled through 2/18/2024 for sacral osteomyelitis. Close monitoring will be maintained throughout this process. Additionally, we are currently awaiting a referral to a Long-Term Acute Care facility.      ANTIMICROBIAL THERAPY    methenamine - 1 g, 1 g  piperacillin-tazobactam (ZOSYN) IVPB 3.375 g in 100 mL NS VTB     Code Status:   Code Status and Medical Interventions:   Ordered at: 01/09/24 0247     Code Status (Patient has no pulse and is not breathing):    CPR (Attempt to Resuscitate)     Medical Interventions (Patient has pulse or is breathing):    Full Support       GUANACO Finley  01/17/24  09:08 EST    Electronically signed by GUANACO Finley, 01/17/24, 9:13 AM EST.      Electronically signed by Tra Jain MD, 01/17/24, 9:21 AM EST.

## 2024-01-18 LAB
A-A DO2: 108 MMHG (ref 0–300)
ANION GAP SERPL CALCULATED.3IONS-SCNC: 6.6 MMOL/L (ref 5–15)
ARTERIAL PATENCY WRIST A: POSITIVE
ATMOSPHERIC PRESS: 729 MMHG
BACTERIA SPEC AEROBE CULT: NORMAL
BACTERIA SPEC AEROBE CULT: NORMAL
BASE EXCESS BLDA CALC-SCNC: 2 MMOL/L (ref 0–2)
BDY SITE: ABNORMAL
BUN SERPL-MCNC: 37 MG/DL (ref 6–20)
BUN/CREAT SERPL: 38.1 (ref 7–25)
CALCIUM SPEC-SCNC: 8.8 MG/DL (ref 8.6–10.5)
CHLORIDE SERPL-SCNC: 105 MMOL/L (ref 98–107)
CO2 BLDA-SCNC: 29.9 MMOL/L (ref 22–33)
CO2 SERPL-SCNC: 30.4 MMOL/L (ref 22–29)
COHGB MFR BLD: 2.1 % (ref 0–5)
CREAT SERPL-MCNC: 0.97 MG/DL (ref 0.76–1.27)
DEPRECATED RDW RBC AUTO: 55.8 FL (ref 37–54)
EGFRCR SERPLBLD CKD-EPI 2021: 97.5 ML/MIN/1.73
ERYTHROCYTE [DISTWIDTH] IN BLOOD BY AUTOMATED COUNT: 18.8 % (ref 12.3–15.4)
GAS FLOW AIRWAY: 40 LPM
GLUCOSE BLDC GLUCOMTR-MCNC: 115 MG/DL (ref 70–130)
GLUCOSE BLDC GLUCOMTR-MCNC: 130 MG/DL (ref 70–130)
GLUCOSE BLDC GLUCOMTR-MCNC: 156 MG/DL (ref 70–130)
GLUCOSE BLDC GLUCOMTR-MCNC: 93 MG/DL (ref 70–130)
GLUCOSE SERPL-MCNC: 98 MG/DL (ref 65–99)
HCO3 BLDA-SCNC: 28.3 MMOL/L (ref 20–26)
HCT VFR BLD AUTO: 31.1 % (ref 37.5–51)
HCT VFR BLD CALC: 26.2 % (ref 38–51)
HGB BLD-MCNC: 8.6 G/DL (ref 13–17.7)
HGB BLDA-MCNC: 8.5 G/DL (ref 14–18)
INHALED O2 CONCENTRATION: 37 %
Lab: ABNORMAL
MCH RBC QN AUTO: 23.8 PG (ref 26.6–33)
MCHC RBC AUTO-ENTMCNC: 27.7 G/DL (ref 31.5–35.7)
MCV RBC AUTO: 85.9 FL (ref 79–97)
METHGB BLD QL: 0 % (ref 0–3)
MODALITY: ABNORMAL
OXYHGB MFR BLDV: 95.2 % (ref 94–99)
PCO2 BLDA: 52.5 MM HG (ref 35–45)
PCO2 TEMP ADJ BLD: ABNORMAL MM[HG]
PH BLDA: 7.34 PH UNITS (ref 7.35–7.45)
PH, TEMP CORRECTED: ABNORMAL
PLATELET # BLD AUTO: 335 10*3/MM3 (ref 140–450)
PMV BLD AUTO: 9 FL (ref 6–12)
PO2 BLDA: 86.3 MM HG (ref 83–108)
PO2 TEMP ADJ BLD: ABNORMAL MM[HG]
POTASSIUM SERPL-SCNC: 3.8 MMOL/L (ref 3.5–5.2)
RBC # BLD AUTO: 3.62 10*6/MM3 (ref 4.14–5.8)
SAO2 % BLDCOA: 97.2 % (ref 94–99)
SODIUM SERPL-SCNC: 142 MMOL/L (ref 136–145)
VENTILATOR MODE: ABNORMAL
WBC NRBC COR # BLD AUTO: 8.4 10*3/MM3 (ref 3.4–10.8)

## 2024-01-18 PROCEDURE — 99233 SBSQ HOSP IP/OBS HIGH 50: CPT | Performed by: NURSE PRACTITIONER

## 2024-01-18 PROCEDURE — 83050 HGB METHEMOGLOBIN QUAN: CPT

## 2024-01-18 PROCEDURE — 87205 SMEAR GRAM STAIN: CPT | Performed by: INTERNAL MEDICINE

## 2024-01-18 PROCEDURE — 87070 CULTURE OTHR SPECIMN AEROBIC: CPT | Performed by: INTERNAL MEDICINE

## 2024-01-18 PROCEDURE — 25010000002 PIPERACILLIN SOD-TAZOBACTAM PER 1 G: Performed by: INTERNAL MEDICINE

## 2024-01-18 PROCEDURE — 94799 UNLISTED PULMONARY SVC/PX: CPT

## 2024-01-18 PROCEDURE — 63710000001 INSULIN LISPRO (HUMAN) PER 5 UNITS: Performed by: INTERNAL MEDICINE

## 2024-01-18 PROCEDURE — 82805 BLOOD GASES W/O2 SATURATION: CPT

## 2024-01-18 PROCEDURE — 25010000002 HEPARIN (PORCINE) PER 1000 UNITS: Performed by: INTERNAL MEDICINE

## 2024-01-18 PROCEDURE — 94660 CPAP INITIATION&MGMT: CPT

## 2024-01-18 PROCEDURE — 82375 ASSAY CARBOXYHB QUANT: CPT

## 2024-01-18 PROCEDURE — 94664 DEMO&/EVAL PT USE INHALER: CPT

## 2024-01-18 PROCEDURE — 82948 REAGENT STRIP/BLOOD GLUCOSE: CPT

## 2024-01-18 PROCEDURE — 36600 WITHDRAWAL OF ARTERIAL BLOOD: CPT

## 2024-01-18 PROCEDURE — 99233 SBSQ HOSP IP/OBS HIGH 50: CPT | Performed by: INTERNAL MEDICINE

## 2024-01-18 PROCEDURE — 80048 BASIC METABOLIC PNL TOTAL CA: CPT | Performed by: INTERNAL MEDICINE

## 2024-01-18 PROCEDURE — 94761 N-INVAS EAR/PLS OXIMETRY MLT: CPT

## 2024-01-18 PROCEDURE — 85027 COMPLETE CBC AUTOMATED: CPT | Performed by: INTERNAL MEDICINE

## 2024-01-18 RX ADMIN — Medication 10 ML: at 22:00

## 2024-01-18 RX ADMIN — FERROUS SULFATE TAB 325 MG (65 MG ELEMENTAL FE) 325 MG: 325 (65 FE) TAB at 22:00

## 2024-01-18 RX ADMIN — IPRATROPIUM BROMIDE AND ALBUTEROL SULFATE 3 ML: 2.5; .5 SOLUTION RESPIRATORY (INHALATION) at 07:14

## 2024-01-18 RX ADMIN — Medication 10 ML: at 08:59

## 2024-01-18 RX ADMIN — Medication 1 TABLET: at 08:57

## 2024-01-18 RX ADMIN — PIPERACILLIN SODIUM AND TAZOBACTAM SODIUM 3.38 G: 3; .375 INJECTION, POWDER, LYOPHILIZED, FOR SOLUTION INTRAVENOUS at 00:39

## 2024-01-18 RX ADMIN — ATORVASTATIN CALCIUM 10 MG: 10 TABLET, FILM COATED ORAL at 22:00

## 2024-01-18 RX ADMIN — MODAFINIL 200 MG: 100 TABLET ORAL at 08:58

## 2024-01-18 RX ADMIN — BACLOFEN 30 MG: 10 TABLET ORAL at 07:23

## 2024-01-18 RX ADMIN — PIPERACILLIN SODIUM AND TAZOBACTAM SODIUM 3.38 G: 3; .375 INJECTION, POWDER, LYOPHILIZED, FOR SOLUTION INTRAVENOUS at 16:30

## 2024-01-18 RX ADMIN — DULOXETINE HYDROCHLORIDE 60 MG: 60 CAPSULE, DELAYED RELEASE ORAL at 08:59

## 2024-01-18 RX ADMIN — BACLOFEN 30 MG: 10 TABLET ORAL at 11:13

## 2024-01-18 RX ADMIN — DANTROLENE SODIUM 100 MG: 25 CAPSULE ORAL at 16:27

## 2024-01-18 RX ADMIN — DAKIN'S SOLUTION 0.125% (QUARTER STRENGTH): 0.12 SOLUTION at 08:59

## 2024-01-18 RX ADMIN — INSULIN LISPRO 2 UNITS: 100 INJECTION, SOLUTION INTRAVENOUS; SUBCUTANEOUS at 17:29

## 2024-01-18 RX ADMIN — HEPARIN SODIUM 5000 UNITS: 5000 INJECTION INTRAVENOUS; SUBCUTANEOUS at 22:00

## 2024-01-18 RX ADMIN — IPRATROPIUM BROMIDE AND ALBUTEROL SULFATE 3 ML: 2.5; .5 SOLUTION RESPIRATORY (INHALATION) at 00:19

## 2024-01-18 RX ADMIN — MUPIROCIN 1 APPLICATION: 20 OINTMENT TOPICAL at 08:59

## 2024-01-18 RX ADMIN — FERROUS SULFATE TAB 325 MG (65 MG ELEMENTAL FE) 325 MG: 325 (65 FE) TAB at 08:57

## 2024-01-18 RX ADMIN — DOCUSATE SODIUM 50 MG AND SENNOSIDES 8.6 MG 2 TABLET: 8.6; 5 TABLET, FILM COATED ORAL at 21:59

## 2024-01-18 RX ADMIN — POTASSIUM CHLORIDE 10 MEQ: 20 TABLET, EXTENDED RELEASE ORAL at 08:57

## 2024-01-18 RX ADMIN — IPRATROPIUM BROMIDE AND ALBUTEROL SULFATE 3 ML: 2.5; .5 SOLUTION RESPIRATORY (INHALATION) at 13:28

## 2024-01-18 RX ADMIN — HEPARIN SODIUM 5000 UNITS: 5000 INJECTION INTRAVENOUS; SUBCUTANEOUS at 14:14

## 2024-01-18 RX ADMIN — DAKIN'S SOLUTION 0.125% (QUARTER STRENGTH): 0.12 SOLUTION at 22:00

## 2024-01-18 RX ADMIN — PANTOPRAZOLE SODIUM 40 MG: 40 TABLET, DELAYED RELEASE ORAL at 05:31

## 2024-01-18 RX ADMIN — MUPIROCIN 1 APPLICATION: 20 OINTMENT TOPICAL at 21:59

## 2024-01-18 RX ADMIN — BACLOFEN 30 MG: 10 TABLET ORAL at 17:29

## 2024-01-18 RX ADMIN — DANTROLENE SODIUM 100 MG: 25 CAPSULE ORAL at 08:58

## 2024-01-18 RX ADMIN — ARIPIPRAZOLE 10 MG: 10 TABLET ORAL at 21:59

## 2024-01-18 RX ADMIN — IPRATROPIUM BROMIDE AND ALBUTEROL SULFATE 3 ML: 2.5; .5 SOLUTION RESPIRATORY (INHALATION) at 19:06

## 2024-01-18 RX ADMIN — GABAPENTIN 400 MG: 400 CAPSULE ORAL at 08:58

## 2024-01-18 RX ADMIN — DULOXETINE HYDROCHLORIDE 60 MG: 60 CAPSULE, DELAYED RELEASE ORAL at 21:59

## 2024-01-18 RX ADMIN — GUAIFENESIN 600 MG: 600 TABLET, EXTENDED RELEASE ORAL at 22:00

## 2024-01-18 RX ADMIN — DOCUSATE SODIUM 50 MG AND SENNOSIDES 8.6 MG 2 TABLET: 8.6; 5 TABLET, FILM COATED ORAL at 08:58

## 2024-01-18 RX ADMIN — METHENAMINE HIPPURATE 1 G: 1000 TABLET ORAL at 17:29

## 2024-01-18 RX ADMIN — GUAIFENESIN 600 MG: 600 TABLET, EXTENDED RELEASE ORAL at 08:59

## 2024-01-18 RX ADMIN — HEPARIN SODIUM 5000 UNITS: 5000 INJECTION INTRAVENOUS; SUBCUTANEOUS at 05:31

## 2024-01-18 RX ADMIN — METHENAMINE HIPPURATE 1 G: 1000 TABLET ORAL at 07:23

## 2024-01-18 RX ADMIN — GABAPENTIN 400 MG: 400 CAPSULE ORAL at 21:59

## 2024-01-18 RX ADMIN — PIPERACILLIN SODIUM AND TAZOBACTAM SODIUM 3.38 G: 3; .375 INJECTION, POWDER, LYOPHILIZED, FOR SOLUTION INTRAVENOUS at 09:01

## 2024-01-18 NOTE — PROGRESS NOTES
"    [facility] HOSPITALIST PROGRESS NOTE     Patient Identification:  Name:  Ken Krueger  Age:  48 y.o.  Sex:  male  :  1975  MRN:  [id number]  Visit Number:  [id number]  ROOM: [address] [place] [address]     Primary Care Provider:  Franchesca Hodges APRN    Length of stay in inpatient status:  9    Subjective     Chief Compliant:    Chief Complaint   Patient presents with    Wound Check     Pt states his primary sent him her for his bed sore on his buttock.      History of Presenting Illness:    Patient remains ill today, no acute events overnight, no new complaints, denies any fevers or chills, does feel he is less \"foggy\" today, more awake, repeat ABG still with C02 retention though has been steadily improving, on HFNC, doesn't tolerate Bipap that well, now coughing up copious amounts of green sputum, sending for culture, on Zosyn per Infectious Disease, will need antibiotics changed long term for sacral osteomyelitis when completes Zosyn for Pneumonia, denied LTACH but suspect this is best option given his new and ongoing respiratory issues and Pneumonia in addition to needing wound care, PT/OT, long term antibiotics.  Appeal in process.   Objective     Current Hospital Meds:  ARIPiprazole, 10 mg, Oral, Nightly  atorvastatin, 10 mg, Oral, Nightly  baclofen, 30 mg, Oral, 4x Daily With Meals & Nightly  bumetanide, 2 mg, Oral, Daily  carvedilol, 12.5 mg, Oral, BID With Meals  dantrolene, 100 mg, Oral, TID  DULoxetine, 60 mg, Oral, BID  ferrous sulfate, 325 mg, Oral, BID  gabapentin, 400 mg, Oral, Q12H  guaiFENesin, 600 mg, Oral, Q12H  heparin (porcine), 5,000 Units, Subcutaneous, Q8H  insulin lispro, 2-7 Units, Subcutaneous, 4x Daily AC & at Bedtime  ipratropium-albuterol, 3 mL, Nebulization, 4x Daily - RT  methenamine, 1 g, Oral, BID With Meals  metOLazone, 5 mg, Oral, Once per day on   modafinil, 200 mg, Oral, Daily  multivitamin with minerals, 1 tablet, Oral, Daily  mupirocin, 1 application " , Each Nare, BID  pantoprazole, 40 mg, Oral, Q AM  piperacillin-tazobactam, 3.375 g, Intravenous, Q8H  potassium chloride, 10 mEq, Oral, Daily  sacubitril-valsartan, 1 tablet, Oral, BID  senna-docusate sodium, 2 tablet, Oral, BID  sodium chloride, 10 mL, Intravenous, Q12H  sodium chloride, 10 mL, Intravenous, Q12H  sodium hypochlorite, , Topical, BID  spironolactone, 25 mg, Oral, Daily         ----------------------------------------------------------------------------------------------------------------------  Vital Signs:  Temp:  [97.7 °F (36.5 °C)-98.4 °F (36.9 °C)] 98.4 °F (36.9 °C)  Heart Rate:  [74-97] 90  Resp:  [18-25] 18  BP: ()/(58-76) 96/62  SpO2:  [92 %-100 %] 93 %  on  Flow (L/min):  [40-60] 40;   Device (Oxygen Therapy): heated;high-flow nasal cannula  Body mass index is 41.37 kg/m².      Intake/Output Summary (Last 24 hours) at 1/18/2024 0929  Last data filed at 1/18/2024 0500  Gross per 24 hour   Intake 600 ml   Output 2225 ml   Net -1625 ml      ----------------------------------------------------------------------------------------------------------------------  Physical exam:  Constitutional:  older than stated age.  No acute distress.      HENT:  Head:  Normocephalic and atraumatic.  Mouth:  Moist mucous membranes.    Eyes:  Conjunctivae and EOM are normal. No scleral icterus.    Neck:  Neck supple.  No JVD present.    Cardiovascular:  Normal rate, regular rhythm and normal heart sounds with no murmur.  Pulmonary/Chest:  No respiratory distress, no wheezes, no crackles, on HFNC  Abdominal:  Soft. No distension and no tenderness.   Musculoskeletal:  No tenderness.  No red or swollen joints anywhere.    Neurological:  Alert and oriented to person, place.  No gross focal deficits     Skin:  Skin is warm and dry. No rash noted. No pallor.   Peripheral vascular:  No clubbing, no cyanosis, no edema.  Psychiatric: Appropriate mood and affect    Edited by: Primitivo Cho MD at 1/18/2024  0947  ----------------------------------------------------------------------------------------------------------------------  WBC/HGB/HCT/PLT   8.40/8.6/31.1/335 (01/18 0205)  BUN/CREAT/GLUC/ALT/AST/FINN/LIP    37/0.97/98/--/--/--/-- (01/18 0205)  LYTES - Na/K/Cl/CO2: 142/3.8/105/30.4* (01/18 0205)     pH/pCO2/pO2/HCO3   7.339/52.5/86.3/28.3 (01/18 0750)  Urine Culture   Date Value Ref Range Status   01/14/2024 25,000 CFU/mL Normal Urogenital Mariza  Final     Blood Culture   Date Value Ref Range Status   01/13/2024 No growth at 4 days  Preliminary   01/13/2024 No growth at 4 days  Preliminary       I have personally looked at the labs and they are summarized above.  ----------------------------------------------------------------------------------------------------------------------  Detailed radiology reports for the last 24 hours:  No radiology results for the last day  Assessment & Plan    #Sacral decubitus ulcer - General Surgery consulted bedside debridement performed during this hospitalization. Reportedly patient is not an appropriate candidate for wound VAC at this time.  Will continue wound care per wound care nurse practitioner recommendations.  Wound culture demonstrates E. coli and Proteus, has been on Flagyl and Rocephin per ID recommendations, see antibiotics changes below, was denied LTACH though appeal in process, family possibly could do antibiotics at home but currently with new Pneumonia and intermittent need for PPV would recommend LTACH over Home Health option     #Acute on chronic osteomyelitis, now with concern for Pneumonia, Bacterial, treating for Aspiration - Continue Zosyn as per above per ID recommendations for new Pneumonia x 7 days.  Will likely require IV antibiotic therapy for 4 to 6 weeks total. Has midline, will need Ceftriaxone long term through 2/18 and Flagyl through 1/21 per Infectious Disease recommendations, send sputum culture for copious amounts now coughing up.    #Acute  Hypercarbic Respiratory Failure requiring Non-invasive Positive Pressure Ventilation    - Pulmonary consulted and following   - Continue HFNC, repeat ABG improved further but still retaining C02, continue for now and as needed thereafter     #Severe sepsis (SHILPA) secondary to chronic sacral decubitus ulcer; Treatment as per above     #Chronic HFpEF/HFrEF - no evidence of exacerbation at this time     #Essential hypertension - well-controlled     #Type 2 diabetes mellitus - continue Accu-Cheks before every meal and nightly with sliding scale insulin     #Morbid obesity - complicates all aspects of care     #Mild confusion - resolved, was likely secondary to mild hospital associated delirium     F: Oral  E: Monitor & Replace as needed   N: Diet: Diabetic Diets; Consistent Carbohydrate; Texture: Regular Texture (IDDSI 7); Fluid Consistency: Thin (IDDSI 0)  PPx: SQH  Code Status (Patient has no pulse and is not breathing): CPR  Medical Interventions (Patient has pulse or is breathing): Full Support     Dispo: Pending clinical improvement, pending LTACH approval    *This patient is high risk due to osteomyelitis, Pneumonia, Acute Hypercarbic Respiratory Failure requiring Non-invasive Positive Pressure Ventilation  Edited by: Primitivo Cho MD at 1/18/2024 8263  [facility] Hospitalist

## 2024-01-18 NOTE — PLAN OF CARE
Goal Outcome Evaluation:           Progress: improving  Outcome Evaluation: Patient is in bed at this time. WC completed per orders. No acute changes or complaints per pt. Pt now on 3LNC, doing well. Will continue POC.

## 2024-01-18 NOTE — CASE MANAGEMENT/SOCIAL WORK
Discharge Planning Assessment   Fabricio     Patient Name: Ken Krueger  MRN: [id number]  Today's Date: 1/18/2024    Admit Date: 1/8/2024    Plan: Appeal is pending to [facility].   Discharge Needs Assessment    No documentation.                  Laurence Gonzalez RN

## 2024-01-18 NOTE — PLAN OF CARE
Goal Outcome Evaluation:  Plan of Care Reviewed With: patient           Outcome Evaluation: Pt resting in bed. WC completed per order. No acute changes noted at this time

## 2024-01-18 NOTE — PROGRESS NOTES
Progress Note Pulmonary      Subjective     Interval History:   Patient is a 48 year old male seen in follow up for acute hypercapnic respiratory acidosis. No acute events reported overnight. Patient is awake and states that he is still feeling weak; however, improved from the previous days. No other complaints at this time.      Review of Systems:    Reviewed ; unchanged       Vital Signs  Temp:  [97.7 °F (36.5 °C)-98.4 °F (36.9 °C)] 98.4 °F (36.9 °C)  Heart Rate:  [74-97] 90  Resp:  [18-25] 18  BP: ()/(58-76) 96/62  Body mass index is 41.37 kg/m².    Intake/Output Summary (Last 24 hours) at 1/18/2024 1144  Last data filed at 1/18/2024 0500  Gross per 24 hour   Intake 600 ml   Output 2225 ml   Net -1625 ml     No intake/output data recorded.    Physical Exam:  General: Morbidly obese male lying in bed, somnolent but easily arousable to verbal stimuli     HEENT: pupils equally reactive to light, normal in size, no scleral icterus     Neck: supple, No JVD, no carotid bruit     Respiratory: Bilateral inspiratory wheezing appreciated with right greater than left, diminished breath sounds to right upper and lower, distant breath sounds bilaterally due to body habitus, paradoxical breathing.     Cardiovascular:  RRR, distant heart sounds due to body habitus     GI: Obese, soft, nontender, nondistended, bowel sounds present, colostomy bag on LLQ with brown loose stool, urostomy on RLQ draining yellow urine     Extremities: Right AKA, 3+ pitting edema of left lower extremity     Neuro: Somnolent but easily arousable to verbal stimuli, able to follow commands        Results Review:      Results from last 7 days   Lab Units 01/18/24  0205 01/17/24  0235 01/16/24  0743   WBC 10*3/mm3 8.40 7.40 6.81   HEMOGLOBIN g/dL 8.6* 8.8* 8.8*   PLATELETS 10*3/mm3 335 353 400     Results from last 7 days   Lab Units 01/18/24  0205 01/17/24  0235 01/16/24  0743   SODIUM mmol/L 142 141 141   POTASSIUM mmol/L 3.8 3.8 3.6   CHLORIDE  "mmol/L 105 105 107   CO2 mmol/L 30.4* 26.4 22.2   BUN mg/dL 37* 31* 23*   CREATININE mg/dL 0.97 0.85 0.68*   CALCIUM mg/dL 8.8 9.0 8.8   GLUCOSE mg/dL 98 105* 99     Lab Results   Component Value Date    INR 1.20 (H) 11/09/2022    INR 1.00 10/29/2021    INR 1.12 (H) 09/10/2018    PROTIME 15.4 (H) 11/09/2022    PROTIME 13.6 10/29/2021    PROTIME 14.6 09/10/2018           Invalid input(s): \"PROT\", \"LABALBU\"  Results from last 7 days   Lab Units 01/18/24  0750   PH, ARTERIAL pH units 7.339*   PO2 ART mm Hg 86.3   PCO2, ARTERIAL mm Hg 52.5*   HCO3 ART mmol/L 28.3*     Imaging Results (Last 24 Hours)       ** No results found for the last 24 hours. **                 ARIPiprazole, 10 mg, Oral, Nightly  atorvastatin, 10 mg, Oral, Nightly  baclofen, 30 mg, Oral, 4x Daily With Meals & Nightly  bumetanide, 2 mg, Oral, Daily  carvedilol, 12.5 mg, Oral, BID With Meals  dantrolene, 100 mg, Oral, TID  DULoxetine, 60 mg, Oral, BID  ferrous sulfate, 325 mg, Oral, BID  gabapentin, 400 mg, Oral, Q12H  guaiFENesin, 600 mg, Oral, Q12H  heparin (porcine), 5,000 Units, Subcutaneous, Q8H  insulin lispro, 2-7 Units, Subcutaneous, 4x Daily AC & at Bedtime  ipratropium-albuterol, 3 mL, Nebulization, 4x Daily - RT  methenamine, 1 g, Oral, BID With Meals  metOLazone, 5 mg, Oral, Once per day on Monday Wednesday Friday  modafinil, 200 mg, Oral, Daily  multivitamin with minerals, 1 tablet, Oral, Daily  mupirocin, 1 application , Each Nare, BID  pantoprazole, 40 mg, Oral, Q AM  piperacillin-tazobactam, 3.375 g, Intravenous, Q8H  potassium chloride, 10 mEq, Oral, Daily  sacubitril-valsartan, 1 tablet, Oral, BID  senna-docusate sodium, 2 tablet, Oral, BID  sodium chloride, 10 mL, Intravenous, Q12H  sodium chloride, 10 mL, Intravenous, Q12H  sodium hypochlorite, , Topical, BID  spironolactone, 25 mg, Oral, Daily           Medication Review:     Assessment & Plan   Patient is a 48 year old male with past medical history significant for severe " obstructive sleep apnea on CPAP, colostomy, urostomy, right AKA, diabetes, and systolic heart failure who presented to the facility due to worsening decubitus ulcer.     Acute hypercapnic respiratory acidosis   Severe ARLIN on CPAP  Suspected obesity hypoventilation syndrome   Severe sepsis, secondary to  chronic sacral decubitus ulcer  Chronic systolic heart failure with last EF of 21-25%  Type 2 diabetes mellitus  Morbid obesity    Patient's ABG continues to show good progression. He is using his home NIPPV and continues to improve in sensorium. Recommend continuing current regimen. Should patient not be able to tolerate his home NIPPV, please notify Dr. Ramsay and start BiPAP.     Checklist:  Antibiotics: Zosyn (1/16-current)  Steroids: None  DVT: SubQ heparin  GI: Pantoprazole     Greatly appreciate Dr. Primitivo Cho for having us participate in the care of this patient.      Rusty Liriano DO  01/18/24  11:44 EST    I have performed an independent face-to-face diagnostic evaluation including performing an independent physical examination.  The documented plan of care above was reviewed and developed with Dr. Rusty Liriano who performed portions of the examination and documentation for this patient's care under my direct supervision    I concur with the assessment and plan outlined by Dr. Rusty Liriano

## 2024-01-18 NOTE — PROGRESS NOTES
PROGRESS NOTE         Patient Identification:  Name:  Ken Krueger  Age:  48 y.o.  Sex:  male  :  1975  MRN:  [id number]  Visit Number:  [id number]  Primary Care Provider:  Franchesca Hodges APRN         LOS: 9 days       ----------------------------------------------------------------------------------------------------------------------  Subjective       Chief Complaints:    Wound Check (Pt states his primary sent him her for his bed sore on his buttock. )        Interval History:        Patient awake and alert this morning.  Wore BiPAP overnight.  Currently on heated high flow at decreased oxygen requirement of 40 L / 37% FiO2.  Lung sounds diminished bilaterally.  Abdomen soft, nontender.  Afebrile, denies any increase in ostomy output.  WBC normal at 8.40.  Respiratory culture from 2024 currently in process.    Review of Systems:      Constitutional: no fever, no chills. No night sweats. No appetite change or unexpected weight change. Positive fatigue.  Eyes: no eye drainage, itching or redness.  HEENT: no mouth sores, dysphagia or nose bleed.  Respiratory: Positive shortness of breath, cough and production of sputum.   Cardiovascular: no chest pain, no palpitations, no orthopnea.  Gastrointestinal: no nausea, vomiting or diarrhea. No abdominal pain, hematemesis or rectal bleeding.  Genitourinary: no dysuria or polyuria.  Hematologic/lymphatic: no lymph node abnormalities, no easy bruising or easy bleeding.  Musculoskeletal: no muscle or joint pain.  Skin: Multiple decubitus ulcers.  Neurological: no loss of consciousness, no seizure, no headache.  Behavioral/Psych: no depression or suicidal ideation.  Endocrine: no hot flashes.  Immunologic: negative.  ----------------------------------------------------------------------------------------------------------------------      Objective       Current [facility] Meds:  ARIPiprazole, 10 mg, Oral, Nightly  atorvastatin, 10 mg, Oral,  Nightly  baclofen, 30 mg, Oral, 4x Daily With Meals & Nightly  bumetanide, 2 mg, Oral, Daily  carvedilol, 12.5 mg, Oral, BID With Meals  dantrolene, 100 mg, Oral, TID  DULoxetine, 60 mg, Oral, BID  ferrous sulfate, 325 mg, Oral, BID  gabapentin, 400 mg, Oral, Q12H  guaiFENesin, 600 mg, Oral, Q12H  heparin (porcine), 5,000 Units, Subcutaneous, Q8H  insulin lispro, 2-7 Units, Subcutaneous, 4x Daily AC & at Bedtime  ipratropium-albuterol, 3 mL, Nebulization, 4x Daily - RT  methenamine, 1 g, Oral, BID With Meals  metOLazone, 5 mg, Oral, Once per day on Monday Wednesday Friday  modafinil, 200 mg, Oral, Daily  multivitamin with minerals, 1 tablet, Oral, Daily  mupirocin, 1 application , Each Nare, BID  pantoprazole, 40 mg, Oral, Q AM  piperacillin-tazobactam, 3.375 g, Intravenous, Q8H  potassium chloride, 10 mEq, Oral, Daily  sacubitril-valsartan, 1 tablet, Oral, BID  senna-docusate sodium, 2 tablet, Oral, BID  sodium chloride, 10 mL, Intravenous, Q12H  sodium chloride, 10 mL, Intravenous, Q12H  sodium hypochlorite, , Topical, BID  spironolactone, 25 mg, Oral, Daily           ----------------------------------------------------------------------------------------------------------------------    Vital Signs:  Temp:  [97.7 °F (36.5 °C)-98.4 °F (36.9 °C)] 98.4 °F (36.9 °C)  Heart Rate:  [74-97] 90  Resp:  [18-25] 18  BP: ()/(58-76) 96/62  No data found.    SpO2 Percentage    01/18/24 0600 01/18/24 0714 01/18/24 0724   SpO2: 95% 99% 93%     SpO2:  [92 %-100 %] 93 %  on  Flow (L/min):  [40-60] 40;   Device (Oxygen Therapy): heated;high-flow nasal cannula    Body mass index is 41.37 kg/m².  Wt Readings from Last 3 Encounters:   01/09/24 135 kg (296 lb 9.6 oz)   12/27/23 (!) 148 kg (326 lb)   12/14/23 (!) 148 kg (326 lb)        Intake/Output Summary (Last 24 hours) at 1/18/2024 0939  Last data filed at 1/18/2024 0500  Gross per 24 hour   Intake 600 ml   Output 2225 ml   Net -1625 ml     Diet: Diabetic Diets; Consistent  Carbohydrate; Texture: Regular Texture (IDDSI 7); Fluid Consistency: Thin (IDDSI 0)  ----------------------------------------------------------------------------------------------------------------------      Physical Exam:    Constitutional: Generally ill-appearing.  Awake and alert this morning.  On heated high flow at 40 L / 37% FiO2.  HENT:  Head: Normocephalic and atraumatic.  Mouth:  Moist mucous membranes.    Eyes:  Conjunctivae and EOM are normal.  No scleral icterus.  Neck:  Neck supple.  No JVD present.    Cardiovascular:  Normal rate, regular rhythm and normal heart sounds with no murmur. No edema.  Pulmonary/Chest: Lung exam is diminished to auscultation bilaterally.  Abdominal:  Soft.  Bowel sounds are normal.  No distension and no tenderness.  Ostomy.  Musculoskeletal: Left AKA.  Functional paraplegia.  Neurological:  Alert and oriented to person, confused   Skin:  Skin is warm and dry.  No rash noted.  No pallor.  Sacral decubitus ulcer, left gluteal pressure ulcer with no evidence of active infection. Right lower gluteal ulcer with necrotic tissue present.  Wounds are dressed, clean dry and intact.        ----------------------------------------------------------------------------------------------------------------------          Results from last 7 days   Lab Units 01/18/24  0750   PH, ARTERIAL pH units 7.339*   PO2 ART mm Hg 86.3   PCO2, ARTERIAL mm Hg 52.5*   HCO3 ART mmol/L 28.3*     Results from last 7 days   Lab Units 01/18/24  0205 01/17/24  0235 01/16/24  0743   WBC 10*3/mm3 8.40 7.40 6.81   HEMOGLOBIN g/dL 8.6* 8.8* 8.8*   HEMATOCRIT % 31.1* 33.8* 33.2*   MCV fL 85.9 89.4 86.9   MCHC g/dL 27.7* 26.0* 26.5*   PLATELETS 10*3/mm3 335 353 400     Results from last 7 days   Lab Units 01/18/24  0205 01/17/24  0235 01/16/24  0743   SODIUM mmol/L 142 141 141   POTASSIUM mmol/L 3.8 3.8 3.6   CHLORIDE mmol/L 105 105 107   CO2 mmol/L 30.4* 26.4 22.2   BUN mg/dL 37* 31* 23*   CREATININE mg/dL 0.97 0.85  "0.68*   CALCIUM mg/dL 8.8 9.0 8.8   GLUCOSE mg/dL 98 105* 99   Estimated Creatinine Clearance: 133.5 mL/min (by C-G formula based on SCr of 0.97 mg/dL).  No results found for: \"AMMONIA\"      Glucose   Date/Time Value Ref Range Status   01/18/2024 0613 93 70 - 130 mg/dL Final   01/17/2024 2051 131 (H) 70 - 130 mg/dL Final   01/17/2024 1636 112 70 - 130 mg/dL Final   01/17/2024 1034 97 70 - 130 mg/dL Final   01/17/2024 0636 137 (H) 70 - 130 mg/dL Final   01/16/2024 2005 106 70 - 130 mg/dL Final   01/16/2024 1640 117 70 - 130 mg/dL Final   01/16/2024 0622 109 70 - 130 mg/dL Final     Lab Results   Component Value Date    HGBA1C 11.50 (H) 02/13/2023     Lab Results   Component Value Date    TSH 3.780 07/19/2022    FREET4 1.31 06/02/2021       Blood Culture   Date Value Ref Range Status   01/08/2024 No growth at 24 hours  Preliminary   01/08/2024 No growth at 24 hours  Preliminary     No results found for: \"URINECX\"  No results found for: \"WOUNDCX\"  No results found for: \"STOOLCX\"  No results found for: \"RESPCX\"  Pain Management Panel  More data exists         Latest Ref Rng & Units 1/13/2024 4/9/2023   Pain Management Panel   Creatinine, Urine mg/dL - 79.5    Amphetamine, Urine Qual Negative Negative  -   Barbiturates Screen, Urine Negative Negative  -   Benzodiazepine Screen, Urine Negative Negative  -   Buprenorphine, Screen, Urine Negative Negative  -   Cocaine Screen, Urine Negative Negative  -   Fentanyl, Urine Negative Negative  -   Methadone Screen , Urine Negative Negative  -   Methamphetamine, Ur Negative Negative  -         ----------------------------------------------------------------------------------------------------------------------  Imaging Results (Last 24 Hours)       ** No results found for the last 24 hours. **            ----------------------------------------------------------------------------------------------------------------------    Pertinent Infectious Disease " Results      Assessment/Plan       Assessment     Severe sepsis with lactic acid greater than 2 on admission  Acute on chronic osteomyelitis        Plan          Patient awake and alert this morning.  Wore BiPAP overnight.  Currently on heated high flow at decreased oxygen requirement of 40 L / 37% FiO2.  Lung sounds diminished bilaterally.  Abdomen soft, nontender.  Afebrile, denies any increase in ostomy output.  WBC normal at 8.40.  Respiratory culture from 1/18/2024 currently in process.    The recommendation is to continue the course of Zosyn at 3.375 g IV every 8 hours for a total of 7 days, with a tentative end date of 1/21/2024. Once the Zosyn course is completed, the plan is to resume ceftriaxone at 2 g IV every 24 hours as scheduled through 2/18/2024 for sacral osteomyelitis. Close monitoring will be maintained throughout this process. Additionally, we are currently awaiting a referral to a Long-Term Acute Care facility.      ANTIMICROBIAL THERAPY    methenamine - 1 g, 1 g  piperacillin-tazobactam (ZOSYN) IVPB 3.375 g in 100 mL NS VTB     Code Status:   Code Status and Medical Interventions:   Ordered at: 01/09/24 0247     Code Status (Patient has no pulse and is not breathing):    CPR (Attempt to Resuscitate)     Medical Interventions (Patient has pulse or is breathing):    Full Support       GUANACO Flores  01/18/24  09:39 EST

## 2024-01-19 ENCOUNTER — OUTSIDE FACILITY SERVICE (OUTPATIENT)
Dept: PULMONOLOGY | Facility: CLINIC | Age: 49
End: 2024-01-19
Payer: MEDICARE

## 2024-01-19 ENCOUNTER — HOSPITAL ENCOUNTER (OUTPATIENT)
Facility: HOSPITAL | Age: 49
Discharge: HOME | End: 2024-02-08
Attending: INTERNAL MEDICINE | Admitting: INTERNAL MEDICINE
Payer: MEDICARE

## 2024-01-19 ENCOUNTER — APPOINTMENT (OUTPATIENT)
Dept: GENERAL RADIOLOGY | Facility: HOSPITAL | Age: 49
End: 2024-01-19
Payer: MEDICARE

## 2024-01-19 VITALS
HEIGHT: 71 IN | OXYGEN SATURATION: 98 % | TEMPERATURE: 97.8 F | RESPIRATION RATE: 18 BRPM | BODY MASS INDEX: 41.52 KG/M2 | WEIGHT: 296.6 LBS | HEART RATE: 90 BPM | DIASTOLIC BLOOD PRESSURE: 63 MMHG | SYSTOLIC BLOOD PRESSURE: 97 MMHG

## 2024-01-19 LAB
ANION GAP SERPL CALCULATED.3IONS-SCNC: 8.6 MMOL/L (ref 5–15)
BUN SERPL-MCNC: 32 MG/DL (ref 6–20)
BUN/CREAT SERPL: 37.2 (ref 7–25)
CALCIUM SPEC-SCNC: 9 MG/DL (ref 8.6–10.5)
CHLORIDE SERPL-SCNC: 106 MMOL/L (ref 98–107)
CO2 SERPL-SCNC: 25.4 MMOL/L (ref 22–29)
CREAT SERPL-MCNC: 0.86 MG/DL (ref 0.76–1.27)
DEPRECATED RDW RBC AUTO: 56.9 FL (ref 37–54)
EGFRCR SERPLBLD CKD-EPI 2021: 108.1 ML/MIN/1.73
ERYTHROCYTE [DISTWIDTH] IN BLOOD BY AUTOMATED COUNT: 19.6 % (ref 12.3–15.4)
GLUCOSE BLDC GLUCOMTR-MCNC: 102 MG/DL (ref 70–130)
GLUCOSE BLDC GLUCOMTR-MCNC: 107 MG/DL (ref 70–130)
GLUCOSE BLDC GLUCOMTR-MCNC: 114 MG/DL (ref 70–130)
GLUCOSE SERPL-MCNC: 117 MG/DL (ref 65–99)
HCT VFR BLD AUTO: 33.4 % (ref 37.5–51)
HGB BLD-MCNC: 8.9 G/DL (ref 13–17.7)
MCH RBC QN AUTO: 23 PG (ref 26.6–33)
MCHC RBC AUTO-ENTMCNC: 26.6 G/DL (ref 31.5–35.7)
MCV RBC AUTO: 86.3 FL (ref 79–97)
PLATELET # BLD AUTO: 374 10*3/MM3 (ref 140–450)
PMV BLD AUTO: 9.7 FL (ref 6–12)
POTASSIUM SERPL-SCNC: 4.7 MMOL/L (ref 3.5–5.2)
RBC # BLD AUTO: 3.87 10*6/MM3 (ref 4.14–5.8)
SODIUM SERPL-SCNC: 140 MMOL/L (ref 136–145)
WBC NRBC COR # BLD AUTO: 8.85 10*3/MM3 (ref 3.4–10.8)

## 2024-01-19 PROCEDURE — 94660 CPAP INITIATION&MGMT: CPT

## 2024-01-19 PROCEDURE — 82948 REAGENT STRIP/BLOOD GLUCOSE: CPT

## 2024-01-19 PROCEDURE — 94799 UNLISTED PULMONARY SVC/PX: CPT

## 2024-01-19 PROCEDURE — 93010 ELECTROCARDIOGRAM REPORT: CPT | Performed by: INTERNAL MEDICINE

## 2024-01-19 PROCEDURE — 25010000002 HEPARIN (PORCINE) PER 1000 UNITS: Performed by: INTERNAL MEDICINE

## 2024-01-19 PROCEDURE — 71045 X-RAY EXAM CHEST 1 VIEW: CPT | Performed by: RADIOLOGY

## 2024-01-19 PROCEDURE — 25010000002 PIPERACILLIN SOD-TAZOBACTAM PER 1 G: Performed by: INTERNAL MEDICINE

## 2024-01-19 PROCEDURE — 99232 SBSQ HOSP IP/OBS MODERATE 35: CPT | Performed by: NURSE PRACTITIONER

## 2024-01-19 PROCEDURE — 87205 SMEAR GRAM STAIN: CPT | Performed by: INTERNAL MEDICINE

## 2024-01-19 PROCEDURE — 71045 X-RAY EXAM CHEST 1 VIEW: CPT

## 2024-01-19 PROCEDURE — 94664 DEMO&/EVAL PT USE INHALER: CPT

## 2024-01-19 PROCEDURE — 87070 CULTURE OTHR SPECIMN AEROBIC: CPT | Performed by: INTERNAL MEDICINE

## 2024-01-19 PROCEDURE — 87186 SC STD MICRODIL/AGAR DIL: CPT | Performed by: INTERNAL MEDICINE

## 2024-01-19 PROCEDURE — 85027 COMPLETE CBC AUTOMATED: CPT | Performed by: INTERNAL MEDICINE

## 2024-01-19 PROCEDURE — 87181 SC STD AGAR DILUTION PER AGT: CPT | Performed by: INTERNAL MEDICINE

## 2024-01-19 PROCEDURE — 80048 BASIC METABOLIC PNL TOTAL CA: CPT | Performed by: INTERNAL MEDICINE

## 2024-01-19 PROCEDURE — 99239 HOSP IP/OBS DSCHRG MGMT >30: CPT | Performed by: INTERNAL MEDICINE

## 2024-01-19 PROCEDURE — 87077 CULTURE AEROBIC IDENTIFY: CPT | Performed by: INTERNAL MEDICINE

## 2024-01-19 PROCEDURE — 94761 N-INVAS EAR/PLS OXIMETRY MLT: CPT

## 2024-01-19 PROCEDURE — 93005 ELECTROCARDIOGRAM TRACING: CPT | Performed by: INTERNAL MEDICINE

## 2024-01-19 RX ORDER — IPRATROPIUM BROMIDE AND ALBUTEROL SULFATE 2.5; .5 MG/3ML; MG/3ML
3 SOLUTION RESPIRATORY (INHALATION) EVERY 4 HOURS PRN
Status: ON HOLD
Start: 2024-01-19

## 2024-01-19 RX ORDER — GABAPENTIN 800 MG/1
400 TABLET ORAL 3 TIMES DAILY
Status: ON HOLD
Start: 2024-01-19

## 2024-01-19 RX ORDER — SODIUM HYPOCHLORITE 2.5 MG/ML
SOLUTION TOPICAL ONCE
Status: COMPLETED | OUTPATIENT
Start: 2024-01-19 | End: 2024-01-19

## 2024-01-19 RX ORDER — INSULIN LISPRO 100 [IU]/ML
2-7 INJECTION, SOLUTION INTRAVENOUS; SUBCUTANEOUS
Status: ON HOLD
Start: 2024-01-19

## 2024-01-19 RX ORDER — IPRATROPIUM BROMIDE AND ALBUTEROL SULFATE 2.5; .5 MG/3ML; MG/3ML
3 SOLUTION RESPIRATORY (INHALATION)
Status: ON HOLD
Start: 2024-01-19

## 2024-01-19 RX ADMIN — PANTOPRAZOLE SODIUM 40 MG: 40 TABLET, DELAYED RELEASE ORAL at 05:11

## 2024-01-19 RX ADMIN — DANTROLENE SODIUM 100 MG: 25 CAPSULE ORAL at 08:06

## 2024-01-19 RX ADMIN — HEPARIN SODIUM 5000 UNITS: 5000 INJECTION INTRAVENOUS; SUBCUTANEOUS at 14:11

## 2024-01-19 RX ADMIN — IPRATROPIUM BROMIDE AND ALBUTEROL SULFATE 3 ML: 2.5; .5 SOLUTION RESPIRATORY (INHALATION) at 06:52

## 2024-01-19 RX ADMIN — IPRATROPIUM BROMIDE AND ALBUTEROL SULFATE 3 ML: 2.5; .5 SOLUTION RESPIRATORY (INHALATION) at 00:11

## 2024-01-19 RX ADMIN — MODAFINIL 200 MG: 100 TABLET ORAL at 08:06

## 2024-01-19 RX ADMIN — Medication 1 TABLET: at 08:07

## 2024-01-19 RX ADMIN — METHENAMINE HIPPURATE 1 G: 1000 TABLET ORAL at 08:07

## 2024-01-19 RX ADMIN — HEPARIN SODIUM 5000 UNITS: 5000 INJECTION INTRAVENOUS; SUBCUTANEOUS at 05:11

## 2024-01-19 RX ADMIN — HYOSCYAMINE SULFATE: 16 SOLUTION at 14:08

## 2024-01-19 RX ADMIN — METOLAZONE 5 MG: 2.5 TABLET ORAL at 08:06

## 2024-01-19 RX ADMIN — DULOXETINE HYDROCHLORIDE 60 MG: 60 CAPSULE, DELAYED RELEASE ORAL at 08:07

## 2024-01-19 RX ADMIN — BACLOFEN 30 MG: 10 TABLET ORAL at 11:34

## 2024-01-19 RX ADMIN — Medication 10 ML: at 08:10

## 2024-01-19 RX ADMIN — Medication 10 ML: at 08:08

## 2024-01-19 RX ADMIN — CARVEDILOL 12.5 MG: 6.25 TABLET, FILM COATED ORAL at 08:07

## 2024-01-19 RX ADMIN — SACUBITRIL AND VALSARTAN 1 TABLET: 24; 26 TABLET, FILM COATED ORAL at 08:07

## 2024-01-19 RX ADMIN — FERROUS SULFATE TAB 325 MG (65 MG ELEMENTAL FE) 325 MG: 325 (65 FE) TAB at 08:07

## 2024-01-19 RX ADMIN — DAKIN'S SOLUTION 0.125% (QUARTER STRENGTH): 0.12 SOLUTION at 08:10

## 2024-01-19 RX ADMIN — MUPIROCIN 1 APPLICATION: 20 OINTMENT TOPICAL at 08:10

## 2024-01-19 RX ADMIN — BUMETANIDE 2 MG: 1 TABLET ORAL at 08:06

## 2024-01-19 RX ADMIN — POTASSIUM CHLORIDE 10 MEQ: 20 TABLET, EXTENDED RELEASE ORAL at 08:07

## 2024-01-19 RX ADMIN — BACLOFEN 30 MG: 10 TABLET ORAL at 08:07

## 2024-01-19 RX ADMIN — SPIRONOLACTONE 25 MG: 25 TABLET, FILM COATED ORAL at 08:10

## 2024-01-19 RX ADMIN — PIPERACILLIN SODIUM AND TAZOBACTAM SODIUM 3.38 G: 3; .375 INJECTION, POWDER, LYOPHILIZED, FOR SOLUTION INTRAVENOUS at 01:29

## 2024-01-19 RX ADMIN — GUAIFENESIN 600 MG: 600 TABLET, EXTENDED RELEASE ORAL at 08:07

## 2024-01-19 RX ADMIN — GABAPENTIN 400 MG: 400 CAPSULE ORAL at 08:07

## 2024-01-19 RX ADMIN — PIPERACILLIN SODIUM AND TAZOBACTAM SODIUM 3.38 G: 3; .375 INJECTION, POWDER, LYOPHILIZED, FOR SOLUTION INTRAVENOUS at 09:49

## 2024-01-19 RX ADMIN — IPRATROPIUM BROMIDE AND ALBUTEROL SULFATE 3 ML: 2.5; .5 SOLUTION RESPIRATORY (INHALATION) at 13:59

## 2024-01-19 RX ADMIN — DOCUSATE SODIUM 50 MG AND SENNOSIDES 8.6 MG 2 TABLET: 8.6; 5 TABLET, FILM COATED ORAL at 08:06

## 2024-01-19 NOTE — PLAN OF CARE
Goal Outcome Evaluation:              Outcome Evaluation: Patient has rested in bed, no complaints or request at this time, wound care completed per order, VSS, 3L NC in place, Will continue plan of care.

## 2024-01-19 NOTE — PAYOR COMM NOTE
"CONTACT: LILIYA FUENTES RN  UTILIZATION MANAGEMENT DEPT.  [facility]  [address] [place] [address]  PHONE: [phone]  FAX: [phone]        DISCHARGE NOTIFICATION  [facility]    REF#[id number]       Ken Deng (48 y.o. Male)       Date of Birth   1975    Social Security Number       Address   [address] [facility] [address]    Home Phone   [phone]    MRN   [id number]       Jain   Judaism    Marital Status                               Admission Date   1/8/24    Admission Type   Emergency    Admitting Provider   Cathy Burrell DO    Attending Provider       Department, Room/Bed   [place] [id number]       Discharge Date   1/19/2024    Discharge Disposition   Long Term Care (DC - External)    Discharge Destination                                 Attending Provider: (none)   Allergies: Keflex [Cephalexin]    Isolation: None   Infection: None   Code Status: CPR    Ht: 180.3 cm (71\")   Wt: 135 kg (296 lb 9.6 oz)    Admission Cmt: None   Principal Problem: Sacral wound [S31.000A]                   Active Insurance as of 1/8/2024       Primary Coverage       Payor Plan Insurance Group Employer/Plan Group    ANTHEM MEDICARE REPLACEMENT ANTHEM MED ADV HMO [id number]       Payor Plan Address Payor Plan Phone Number Payor Plan Fax Number Effective Dates    [address] [phone]  1/1/2024 - None Entered    [facility] [id number]         Subscriber Name Subscriber Birth Date Member ID       KEN DENG 1975 [id number]               Secondary Coverage       Payor Plan Insurance Group Employer/Plan Group    [place]        Payor Plan Address Payor Plan Phone Number Payor Plan Fax Number Effective Dates    [address] [phone]  7/13/2019 - None Entered    [facility] [id number]         Subscriber Name Subscriber Birth Date Member ID       KEN DENG 1975 [id number]               "       Emergency Contacts        (Rel.) Home Phone Work Phone Mobile Phone    Pineda Krueger (Power of ) [phone] -- --                 Discharge Summary        Primitivo Cho MD at 24 1302              [facility] HOSPITALISTS DISCHARGE SUMMARY    Patient Identification:  Name:  Ken Krueger  Age:  48 y.o.  Sex:  male  :  1975  MRN:  [id number]  Visit Number:  [id number]    Date of Admission: 2024  Date of Discharge:  2024     PCP: Franchesca Hodges APRN    DISCHARGE DIAGNOSIS  Acute on chronic osteomyelitis - Improved   Pneumonia, Bacterial, treating for Aspiration - Improving   Acute Hypercarbic Respiratory Failure requiring Non-invasive Positive Pressure Ventilation - Improved   Severe sepsis - Sohail  Chronic sacral decubitus ulcer status post debridement - Improved   Mild confusion - Resolved   Chronic HFpEF/HFrEF   Essential hypertension  Type 2 diabetes mellitus  Morbid obesity by BMI     CONSULTS   Consults       Date and Time Order Name Status Description    2024 11:18 AM Inpatient Pulmonology Consult Completed     2024  4:43 AM Inpatient Infectious Diseases Consult Completed           PROCEDURES PERFORMED  Surgical debridement     HOSPITAL COURSE  Patient is a 48 y.o. male presented to [facility] complaining of sore buttock.  Please see the admitting history and physical for further details.      #Sacral decubitus ulcer  General Surgery consulted and followed, bedside debridement performed during this hospitalization. Reportedly patient is not an appropriate candidate for wound VAC at this time.  Continue wound care per wound care nurse practitioner recommendations.  Wound culture demonstrated E. coli and Proteus sp, patient had been on Flagyl and Rocephin per ID recommendations, though now on Zosyn for Pneumonia as per below.  Due to his respiratory issues, need for long term antibiotics and continued significant wound care, patient is  being admitted to LTACH today.      #Acute on chronic osteomyelitis, now with concern for Pneumonia, Bacterial, treating for Aspiration  Patient had been on Flagyl and Ceftriaxone for acute on chronic osteomyelitis, then developed increased respiratory symptoms and developed copious amounts of sputum, antibiotics were switched to Zosyn with respiratory culture pending, Infectious Disease consulted and following, will continue Zosyn x 7 day unless culture allows de-escalation, would plan on resuming Ceftriaxone and Flagyl thereafter per Infectious Disease recommendations with Ceftriaxone through 2/18 and Flagyl through 1/21.    #Acute Hypercarbic Respiratory Failure requiring Non-invasive Positive Pressure Ventilation    Pulmonary consulted and followed, suspect due to obesity and Pneumonia, continue breathing treatments and BiPAP nightly and as needed      #Severe sepsis (SHILPA) secondary to chronic sacral decubitus ulcer  Treatment as per above     #Chronic HFpEF/HFrEF   No evidence of exacerbation at this time     #Essential hypertension  Continued current regimen, controlled     #Type 2 diabetes mellitus  Continued FSBG and SSI      #Morbid obesity  Complicates all aspects of care     #Mild confusion - Resolved   Likely secondary to mild hospital associated delirium    Edited by: Primitivo Cho MD at 1/19/2024 1302    VITAL SIGNS:  Temp:  [97.8 °F (36.6 °C)-98 °F (36.7 °C)] 97.8 °F (36.6 °C)  Heart Rate:  [] 97  Resp:  [18-26] 19  BP: ()/(50-68) 104/63  SpO2:  [92 %-98 %] 93 %  on  Flow (L/min):  [1-6] 1;   Device (Oxygen Therapy): nasal cannula    Body mass index is 41.37 kg/m².  Wt Readings from Last 3 Encounters:   01/09/24 135 kg (296 lb 9.6 oz)   12/27/23 (!) 148 kg (326 lb)   12/14/23 (!) 148 kg (326 lb)     PHYSICAL EXAM:  Constitutional:  older than stated age.  No acute distress.      HENT:  Head:  Normocephalic and atraumatic.  Mouth:  Moist mucous membranes.    Eyes:  Conjunctivae and EOM are  normal. No scleral icterus.    Neck:  Neck supple.  No JVD present.    Cardiovascular:  Normal rate, regular rhythm and normal heart sounds with no murmur.  Pulmonary/Chest:  No respiratory distress, no wheezes, no crackles, on HFNC  Abdominal:  Soft. No distension and no tenderness.   Musculoskeletal:  No tenderness.  No red or swollen joints anywhere.    Neurological:  Alert and oriented to person, place.  No gross focal deficits     Skin:  Skin is warm and dry. No rash noted. No pallor.   Peripheral vascular:  No clubbing, no cyanosis, no edema.  Psychiatric: Appropriate mood and affect    *Examination stable today 1/19    Edited by: Primitivo Cho MD at 1/19/2024 6858    DISCHARGE DISPOSITION   Stable    DISCHARGE MEDICATIONS:     Discharge Medications        New Medications        Instructions Start Date   Insulin Lispro 100 UNIT/ML injection  Commonly known as: humaLOG   2-7 Units, Subcutaneous, 4 Times Daily Before Meals & Nightly      ipratropium-albuterol 0.5-2.5 mg/3 ml nebulizer  Commonly known as: DUO-NEB   3 mL, Nebulization, 4 Times Daily - RT      ipratropium-albuterol 0.5-2.5 mg/3 ml nebulizer  Commonly known as: DUO-NEB   3 mL, Nebulization, Every 4 Hours PRN      sodium chloride 0.9 % solution 100 mL with piperacillin-tazobactam 3.375 (3-0.375) g reconstituted solution 3.375 g IVPB   3.375 g, Intravenous, Every 8 Hours             Changes to Medications        Instructions Start Date   gabapentin 800 MG tablet  Commonly known as: NEURONTIN  What changed: how much to take   400 mg, Oral, 3 Times Daily             Continue These Medications        Instructions Start Date   ARIPiprazole 10 MG tablet  Commonly known as: ABILIFY   10 mg, Oral, Nightly      atorvastatin 10 MG tablet  Commonly known as: LIPITOR   10 mg, Oral, Nightly      baclofen 20 MG tablet  Commonly known as: LIORESAL   30 mg, Oral, 4 Times Daily With Meals & Nightly      bumetanide 2 MG tablet  Commonly known as: BUMEX   2 mg, Oral,  Daily      carvedilol 12.5 MG tablet  Commonly known as: Coreg   12.5 mg, Oral, 2 Times Daily With Meals      dantrolene 25 MG capsule  Commonly known as: DANTRIUM   100 mg, Oral, 3 Times Daily      DULoxetine 60 MG capsule  Commonly known as: CYMBALTA   60 mg, Oral, 2 Times Daily      Entresto 24-26 MG tablet  Generic drug: sacubitril-valsartan   1 tablet, Oral, 2 Times Daily      ferrous sulfate 325 (65 FE) MG tablet   325 mg, Oral, 2 Times Daily      furosemide 20 MG tablet  Commonly known as: LASIX   20 mg, Oral, Daily PRN      ipratropium 0.02 % nebulizer solution  Commonly known as: ATROVENT   500 mcg, Nebulization, Every 4 Hours PRN      methenamine 1 g tablet  Commonly known as: HIPREX   1 g, Oral, 2 Times Daily With Meals      metOLazone 2.5 MG tablet  Commonly known as: ZAROXOLYN   5 mg, Oral, 3 Times Weekly      modafinil 200 MG tablet  Commonly known as: PROVIGIL   200 mg, Oral, Daily      multivitamin with minerals tablet tablet   1 tablet, Oral, Daily      omeprazole 40 MG capsule  Commonly known as: priLOSEC   40 mg, Oral, 2 Times Daily      potassium chloride 10 MEQ CR tablet   10 mEq, Oral, Daily      spironolactone 25 MG tablet  Commonly known as: ALDACTONE   25 mg, Oral, Daily             Stop These Medications      albuterol 1.25 MG/3ML nebulizer solution  Commonly known as: ACCUNEB     amoxicillin-clavulanate 500-125 MG per tablet  Commonly known as: Augmentin     apixaban 5 MG tablet tablet  Commonly known as: ELIQUIS     metFORMIN 1000 MG tablet  Commonly known as: GLUCOPHAGE     Ozempic (1 MG/DOSE) 2 MG/1.5ML solution pen-injector  Generic drug: Semaglutide (1 MG/DOSE)     tiZANidine 2 MG tablet  Commonly known as: ZANAFLEX     Verquvo 10 MG tablet  Generic drug: Vericiguat             Follow-up Information       Verneice Deluca APRN. Schedule an appointment as soon as possible for a visit in 1 week(s).    Specialties: Infectious Diseases, Nurse Practitioner  Contact information:  [address]  [place] [id number]  [phone]               Verenice Deluca, APRN .    Specialties: Infectious Diseases, Nurse Practitioner  Contact information:  [address]  [place] [id number]  [phone]               Franchesca Hodges, APRN .    Specialty: Nurse Practitioner  Contact information:  [address] [place] [id number]  [phone]                            TEST  RESULTS PENDING AT DISCHARGE  Pending Labs       Order Current Status    Respiratory Culture - Sputum, Cough Preliminary result          CODE STATUS  Code Status and Medical Interventions:   Ordered at: 01/09/24 0247     Code Status (Patient has no pulse and is not breathing):    CPR (Attempt to Resuscitate)     Medical Interventions (Patient has pulse or is breathing):    Full Support     Primitivo Cho MD  [facility] Hospitalist  01/19/24  13:02 EST    Please note that this discharge summary required more than 30 minutes to complete.        Electronically signed by Primitivo Cho MD at 01/19/24 1305       Discharge Order (From admission, onward)       Start     Ordered    01/19/24 1255  Discharge patient  Once        Expected Discharge Date: 01/19/24   Expected Discharge Time: Morning   Discharge Disposition: Long Term Care (DC - External)   Physician of Record for Attribution - Please select from Treatment Team: ETHAN SANDOVAL [[id number]]   Review needed by CMO to determine Physician of Record: No   Please choose which facility the patient is currently admitted if they are being discharged to another facility or unit.:  [facility]   Intrafacility: Continue Care      Question Answer Comment   Physician of Record for Attribution - Please select from Treatment Team ETHAN SANDOVAL    Review needed by CMO to determine Physician of Record No    Please choose which facility the patient is currently admitted if they are being discharged to another facility or unit.  [facility]    Intrafacility Continue Care         01/19/24 1257

## 2024-01-19 NOTE — PLAN OF CARE
Goal Outcome Evaluation:  Plan of Care Reviewed With: patient           Outcome Evaluation: Patient being discharged to [facility].

## 2024-01-19 NOTE — PROGRESS NOTES
"Progress Note Pulmonary and critical care    Pulmonary and critical care following the patient for shortness of breath and respiratory failure  Subjective     Overnight events reviewed.  All the labs medications and the notes and vitals reviewed.   Will be going to an LTAC.      Review of Systems:    Reviewed ; unchanged       Vital Signs  Temp:  [97.8 °F (36.6 °C)-98 °F (36.7 °C)] 97.8 °F (36.6 °C)  Heart Rate:  [] 90  Resp:  [18-26] 18  BP: ()/(50-68) 97/63  Body mass index is 41.37 kg/m².    Intake/Output Summary (Last 24 hours) at 1/19/2024 1444  Last data filed at 1/19/2024 1358  Gross per 24 hour   Intake 920.1 ml   Output 3700 ml   Net -2779.9 ml     I/O this shift:  In: 240 [P.O.:240]  Out: 1150 [Urine:1150]    Physical Exam:  Patient was seen and examined via telemedicine.     General-obese in appearance, not in any acute distress    HEENT- no scleral icterus    Neck-supple    Respiratory-respirations normal, not in any respiratory distress  Cardiovascular-  NSR    GI-nondistended     CNS-nonfocal    Musculoskeletal -no visible edema  Extremities- no obvious deformity noticed     Psychiatric-alert awake  Skin- no visible rash          Results Review:      Results from last 7 days   Lab Units 01/19/24  0206 01/18/24  0205 01/17/24  0235   WBC 10*3/mm3 8.85 8.40 7.40   HEMOGLOBIN g/dL 8.9* 8.6* 8.8*   PLATELETS 10*3/mm3 374 335 353     Results from last 7 days   Lab Units 01/19/24  0206 01/18/24  0205 01/17/24  0235   SODIUM mmol/L 140 142 141   POTASSIUM mmol/L 4.7 3.8 3.8   CHLORIDE mmol/L 106 105 105   CO2 mmol/L 25.4 30.4* 26.4   BUN mg/dL 32* 37* 31*   CREATININE mg/dL 0.86 0.97 0.85   CALCIUM mg/dL 9.0 8.8 9.0   GLUCOSE mg/dL 117* 98 105*     Lab Results   Component Value Date    INR 1.20 (H) 11/09/2022    INR 1.00 10/29/2021    INR 1.12 (H) 09/10/2018    PROTIME 15.4 (H) 11/09/2022    PROTIME 13.6 10/29/2021    PROTIME 14.6 09/10/2018           Invalid input(s): \"PROT\", \"LABALBU\"  Results " from last 7 days   Lab Units 01/18/24  0750   PH, ARTERIAL pH units 7.339*   PO2 ART mm Hg 86.3   PCO2, ARTERIAL mm Hg 52.5*   HCO3 ART mmol/L 28.3*     Imaging Results (Last 24 Hours)       ** No results found for the last 24 hours. **                 ARIPiprazole, 10 mg, Oral, Nightly  atorvastatin, 10 mg, Oral, Nightly  baclofen, 30 mg, Oral, 4x Daily With Meals & Nightly  bumetanide, 2 mg, Oral, Daily  carvedilol, 12.5 mg, Oral, BID With Meals  dantrolene, 100 mg, Oral, TID  DULoxetine, 60 mg, Oral, BID  ferrous sulfate, 325 mg, Oral, BID  gabapentin, 400 mg, Oral, Q12H  guaiFENesin, 600 mg, Oral, Q12H  heparin (porcine), 5,000 Units, Subcutaneous, Q8H  insulin lispro, 2-7 Units, Subcutaneous, 4x Daily AC & at Bedtime  ipratropium-albuterol, 3 mL, Nebulization, 4x Daily - RT  methenamine, 1 g, Oral, BID With Meals  metOLazone, 5 mg, Oral, Once per day on Monday Wednesday Friday  modafinil, 200 mg, Oral, Daily  multivitamin with minerals, 1 tablet, Oral, Daily  mupirocin, 1 application , Each Nare, BID  pantoprazole, 40 mg, Oral, Q AM  piperacillin-tazobactam, 3.375 g, Intravenous, Q8H  potassium chloride, 10 mEq, Oral, Daily  sacubitril-valsartan, 1 tablet, Oral, BID  senna-docusate sodium, 2 tablet, Oral, BID  sodium chloride, 10 mL, Intravenous, Q12H  sodium chloride, 10 mL, Intravenous, Q12H  spironolactone, 25 mg, Oral, Daily           Medication Review:    Latest Reference Range & Units 01/13/24 15:42 01/16/24 11:09 01/16/24 17:28 01/17/24 07:59 01/18/24 07:50   pH, Arterial 7.350 - 7.450 pH units 7.354 7.260 (L) 7.296 (L) 7.306 (L) 7.339 (L)   pCO2, Arterial 35.0 - 45.0 mm Hg 45.8 (H) 63.4 (H) 59.1 (H) 57.3 (H) 52.5 (H)   pO2, Arterial 83.0 - 108.0 mm Hg 60.0 (L) 88.0 77.7 (L) 102.0 86.3   HCO3, Arterial 20.0 - 26.0 mmol/L 25.5 28.4 (H) 28.8 (H) 28.6 (H) 28.3 (H)   Base Excess 0.0 - 2.0 mmol/L -0.2 (L) 0.7 1.7 1.7 2.0   O2 Saturation, Arterial 94.0 - 99.0 % 90.3 (L) 96.1 95.2 98.4 97.2   CO2 Content 22 -  33 mmol/L 26.9 30.4 30.6 30.3 29.9   A-a DO2 0.0 - 300.0 mmHg 78.6 87.6 61.0 39.8 108.0   Carboxyhemoglobin 0 - 5 % 2.0 1.9 2.1 1.9 2.1   Methemoglobin 0.00 - 3.00 % 0.00 0.10 0.00 0.00 0.00   Oxyhemoglobin 94 - 99 % 88.5 (L) 94.2 93.2 (L) 96.5 95.2   Hematocrit, Blood Gas 38.0 - 51.0 % 26.2 (L) 26.2 (L) 25.3 (L) 26.0 (L) 26.2 (L)   Hemoglobin, Blood Gas 14 - 18 g/dL 8.5 (L) 8.5 (L) 8.3 (L) 8.5 (L) 8.5 (L)   Site  Right Radial Right Radial Right Radial Right Radial Right Radial   Kalpesh's Test  Positive N/A N/A Positive Positive   Modality  Nasal Cannula Room Air BiPap BiPap Heated HFNC   FIO2 % 28 36 30 30 37   Flow Rate lpm 2.0 4.0   40.0   Ventilator Mode  NA NA NA NA NA   Set [facility] Resp Rate    20.0 20.0    IPAP    20 20    EPAP    14 14    Barometric Pressure for Blood Gas mmHg 724 729 [phone]   Notified By   DR HUGO ANAND     (L): Data is abnormally low  (H): Data is abnormally high    Assessment & Plan     Acute hypercapnic respiratory failure-continue current treatment.  Latest ABG shows improvement.  Latest ABG and chest x-ray reviewed.    Continue diuretics to improve lung compliance and diffusion capacity.  ARLIN-continue home CPAP machine.  was educated about ill health effects of sleep apnea and what all effects it can have on health in long-term.  Which includes blood clots, arrhythmias, stroke, depression, hypothyroidism. was also educated about the pathophysiology of sleep apnea and how weight contributes in it.  Patient understood the conversation well and will try and do exercise and eat right kind of food to lose weight.    Congestive heart failure-continue current treatment-continue diuretics.  Results for orders placed during the hospital encounter of 02/08/23    Adult Transthoracic Echo Complete w/ Color, Spectral and Contrast if necessary per protocol    Interpretation Summary    Left ventricular systolic function is severely decreased. Left ventricular ejection fraction appears to be 21 -  25%.    The left ventricular cavity is moderately dilated.    Left ventricular wall thickness is consistent with mild to moderate concentric hypertrophy.    Left ventricular diastolic function is consistent with (grade III w/high LAP) fixed restrictive pattern.    This is a technically limited study with poor echo windows.       Diabetes mellitus-continue to monitor blood sugars target 1 40-1 80    Continue appropriate prophylaxis.      Case d/w nurse and team   This service is provided via audio video tele medicine   I am in [facility] and patient is in [facility]          Modesto Sampson MD  01/19/24  14:44 EST

## 2024-01-19 NOTE — DISCHARGE INSTR - ACTIVITY
Activity as Tolerated      Right Gluteal, Left gluteal, Sacral Wounds- Cleanse/irrigate wounds with dakins. Pack wounds with small kerlex roll dampened in Dakins. Cover with Mepilex every 12 hours.   Right Lateral Knee- Wound Cleanser, Therahoney, Mepilex

## 2024-01-19 NOTE — PROGRESS NOTES
[facility] HOSPITALIST PROGRESS NOTE     Patient Identification:  Name:  Ken Krueger  Age:  48 y.o.  Sex:  male  :  1975  MRN:  [id number]  Visit Number:  [id number]  ROOM: [address] [place] [address]     Primary Care Provider:  Franchesca Hodges APRN    Length of stay in inpatient status:  10    Subjective     Chief Compliant:    Chief Complaint   Patient presents with    Wound Check     Pt states his primary sent him her for his bed sore on his buttock.      History of Presenting Illness:    Patient remains ill but stable today, no acute events overnight, no new complaints, denies any fevers or chills, wearing bipap this AM, woke up easily to verbal, still coughing up copious mucous in gatorade bottle, culture growing GN Bacilli, on Zosyn per Infectious Disease, follow up updated recommendations, appeal to LTACH pending, labs and vitals stable today, will need significant wound care as well making LTACH best option.   Objective     Current Hospital Meds:  ARIPiprazole, 10 mg, Oral, Nightly  atorvastatin, 10 mg, Oral, Nightly  baclofen, 30 mg, Oral, 4x Daily With Meals & Nightly  bumetanide, 2 mg, Oral, Daily  carvedilol, 12.5 mg, Oral, BID With Meals  dantrolene, 100 mg, Oral, TID  DULoxetine, 60 mg, Oral, BID  ferrous sulfate, 325 mg, Oral, BID  gabapentin, 400 mg, Oral, Q12H  guaiFENesin, 600 mg, Oral, Q12H  heparin (porcine), 5,000 Units, Subcutaneous, Q8H  insulin lispro, 2-7 Units, Subcutaneous, 4x Daily AC & at Bedtime  ipratropium-albuterol, 3 mL, Nebulization, 4x Daily - RT  methenamine, 1 g, Oral, BID With Meals  metOLazone, 5 mg, Oral, Once per day on   modafinil, 200 mg, Oral, Daily  multivitamin with minerals, 1 tablet, Oral, Daily  mupirocin, 1 application , Each Nare, BID  pantoprazole, 40 mg, Oral, Q AM  piperacillin-tazobactam, 3.375 g, Intravenous, Q8H  potassium chloride, 10 mEq, Oral, Daily  sacubitril-valsartan, 1 tablet, Oral, BID  senna-docusate sodium, 2 tablet,  Oral, BID  sodium chloride, 10 mL, Intravenous, Q12H  sodium chloride, 10 mL, Intravenous, Q12H  sodium hypochlorite, , Topical, BID  spironolactone, 25 mg, Oral, Daily         ----------------------------------------------------------------------------------------------------------------------  Vital Signs:  Temp:  [97.8 °F (36.6 °C)-98.3 °F (36.8 °C)] 97.8 °F (36.6 °C)  Heart Rate:  [] 97  Resp:  [18-26] 19  BP: ()/(50-68) 104/63  SpO2:  [92 %-98 %] 95 %  on  Flow (L/min):  [2-6] 2;   Device (Oxygen Therapy): nasal cannula  Body mass index is 41.37 kg/m².      Intake/Output Summary (Last 24 hours) at 1/19/2024 0958  Last data filed at 1/19/2024 0512  Gross per 24 hour   Intake 1160.1 ml   Output 2550 ml   Net -1389.9 ml      ----------------------------------------------------------------------------------------------------------------------  Physical exam:  Constitutional:  older than stated age.  No acute distress.      HENT:  Head:  Normocephalic and atraumatic.  Mouth:  Moist mucous membranes.    Eyes:  Conjunctivae and EOM are normal. No scleral icterus.    Neck:  Neck supple.  No JVD present.    Cardiovascular:  Normal rate, regular rhythm and normal heart sounds with no murmur.  Pulmonary/Chest:  No respiratory distress, no wheezes, no crackles, on HFNC  Abdominal:  Soft. No distension and no tenderness.   Musculoskeletal:  No tenderness.  No red or swollen joints anywhere.    Neurological:  Alert and oriented to person, place.  No gross focal deficits     Skin:  Skin is warm and dry. No rash noted. No pallor.   Peripheral vascular:  No clubbing, no cyanosis, no edema.  Psychiatric: Appropriate mood and affect    *Examination stable today 1/19    Edited by: Primitivo Cho MD at 1/19/2024 0957  ----------------------------------------------------------------------------------------------------------------------  WBC/HGB/HCT/PLT   8.85/8.9/33.4/374 (01/19 0206)  BUN/CREAT/GLUC/ALT/AST/FINN/LIP     32/0.86/117/--/--/--/-- (01/19 0206)  LYTES - Na/K/Cl/CO2: 140/4.7/106/25.4 (01/19 0206)     Urine Culture   Date Value Ref Range Status   01/14/2024 25,000 CFU/mL Normal Urogenital Mariza  Final     Blood Culture   Date Value Ref Range Status   01/13/2024 No growth at 5 days  Final   01/13/2024 No growth at 5 days  Final     I have personally looked at the labs and they are summarized above.  ----------------------------------------------------------------------------------------------------------------------  Detailed radiology reports for the last 24 hours:  No radiology results for the last day  Assessment & Plan    #Sacral decubitus ulcer - General Surgery consulted bedside debridement performed during this hospitalization. Reportedly patient is not an appropriate candidate for wound VAC at this time.  Will continue wound care per wound care nurse practitioner recommendations.  Wound culture demonstrates E. coli and Proteus, has been on Flagyl and Rocephin per ID recommendations, see antibiotics changes below, was denied LTACH though appeal in process, family possibly could do antibiotics at home but currently with new Pneumonia and intermittent need for PPV and significant wound care, would recommend LTACH over Home Health option     #Acute on chronic osteomyelitis, now with concern for Pneumonia, Bacterial, treating for Aspiration - Continue Zosyn as per above per ID recommendations for new Pneumonia x 7 days, respiratory culture growing GN Bacilli, follow up speciation.  Will likely require IV antibiotic therapy for 4 to 6 weeks total. Has midline, will need Ceftriaxone long term through 2/18 and Flagyl through 1/21 per Infectious Disease recommendations, send sputum culture for copious amounts now coughing up.    #Acute Hypercarbic Respiratory Failure requiring Non-invasive Positive Pressure Ventilation    - Pulmonary consulted and following   - Continue HFNC/Bipap as needed, wean as tolerated     #Severe  sepsis (SHILPA) secondary to chronic sacral decubitus ulcer; Treatment as per above     #Chronic HFpEF/HFrEF - no evidence of exacerbation at this time     #Essential hypertension - well-controlled     #Type 2 diabetes mellitus - continue Accu-Cheks before every meal and nightly with sliding scale insulin     #Morbid obesity - complicates all aspects of care     #Mild confusion - resolved, was likely secondary to mild hospital associated delirium     F: Oral  E: Monitor & Replace as needed   N: Diet: Diabetic Diets; Consistent Carbohydrate; Texture: Regular Texture (IDDSI 7); Fluid Consistency: Thin (IDDSI 0)  PPx: SQH  Code Status (Patient has no pulse and is not breathing): CPR  Medical Interventions (Patient has pulse or is breathing): Full Support     Dispo: Pending clinical improvement, pending LTACH approval    *This patient is high risk due to osteomyelitis, Pneumonia, Acute Hypercarbic Respiratory Failure requiring Non-invasive Positive Pressure Ventilation    Edited by: Primitivo Cho MD at 1/19/2024 0957  [facility]
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
## 2024-01-19 NOTE — PROGRESS NOTES
PROGRESS NOTE         Patient Identification:  Name:  Ken Krueger  Age:  48 y.o.  Sex:  male  :  1975  MRN:  [id number]  Visit Number:  [id number]  Primary Care Provider:  Franchesca Hodges APRN         LOS: 10 days       ----------------------------------------------------------------------------------------------------------------------  Subjective       Chief Complaints:    Wound Check (Pt states his primary sent him her for his bed sore on his buttock. )        Interval History:      Patient sitting up in bed this morning eating breakfast.  On decreased oxygen requirement of 3 L per nasal cannula with no apparent distress.  Mild scattered rhonchi noted in the bases bilaterally, overall improved.  Patient reports improved cough with continued production of sputum.  Abdomen distended, nontender.  Reports no increase in ostomy output.  Afebrile.  WBC 8.85.  Respiratory culture from 2024 currently in process.    Review of Systems:      Constitutional: no fever, no chills. No night sweats. No appetite change or unexpected weight change. Positive fatigue.  Eyes: no eye drainage, itching or redness.  HEENT: no mouth sores, dysphagia or nose bleed.  Respiratory: No shortness of breath, positive cough and production of sputum.   Cardiovascular: no chest pain, no palpitations, no orthopnea.  Gastrointestinal: no nausea, vomiting or diarrhea. No abdominal pain, hematemesis or rectal bleeding.  Genitourinary: no dysuria or polyuria.  Hematologic/lymphatic: no lymph node abnormalities, no easy bruising or easy bleeding.  Musculoskeletal: no muscle or joint pain.  Skin: Multiple decubitus ulcers.  Neurological: no loss of consciousness, no seizure, no headache.  Behavioral/Psych: no depression or suicidal ideation.  Endocrine: no hot flashes.  Immunologic: negative.  ----------------------------------------------------------------------------------------------------------------------      Objective        Current Hospital Meds:  ARIPiprazole, 10 mg, Oral, Nightly  atorvastatin, 10 mg, Oral, Nightly  baclofen, 30 mg, Oral, 4x Daily With Meals & Nightly  bumetanide, 2 mg, Oral, Daily  carvedilol, 12.5 mg, Oral, BID With Meals  dantrolene, 100 mg, Oral, TID  DULoxetine, 60 mg, Oral, BID  ferrous sulfate, 325 mg, Oral, BID  gabapentin, 400 mg, Oral, Q12H  guaiFENesin, 600 mg, Oral, Q12H  heparin (porcine), 5,000 Units, Subcutaneous, Q8H  insulin lispro, 2-7 Units, Subcutaneous, 4x Daily AC & at Bedtime  ipratropium-albuterol, 3 mL, Nebulization, 4x Daily - RT  methenamine, 1 g, Oral, BID With Meals  metOLazone, 5 mg, Oral, Once per day on Monday Wednesday Friday  modafinil, 200 mg, Oral, Daily  multivitamin with minerals, 1 tablet, Oral, Daily  mupirocin, 1 application , Each Nare, BID  pantoprazole, 40 mg, Oral, Q AM  piperacillin-tazobactam, 3.375 g, Intravenous, Q8H  potassium chloride, 10 mEq, Oral, Daily  sacubitril-valsartan, 1 tablet, Oral, BID  senna-docusate sodium, 2 tablet, Oral, BID  sodium chloride, 10 mL, Intravenous, Q12H  sodium chloride, 10 mL, Intravenous, Q12H  sodium hypochlorite, , Topical, BID  spironolactone, 25 mg, Oral, Daily           ----------------------------------------------------------------------------------------------------------------------    Vital Signs:  Temp:  [97.8 °F (36.6 °C)-98.3 °F (36.8 °C)] 97.8 °F (36.6 °C)  Heart Rate:  [] 97  Resp:  [18-26] 19  BP: ()/(50-68) 104/63  No data found.    SpO2 Percentage    01/19/24 0300 01/19/24 0637 01/19/24 0652   SpO2: 97% 92% 95%     SpO2:  [92 %-98 %] 95 %  on  Flow (L/min):  [2-6] 2;   Device (Oxygen Therapy): nasal cannula    Body mass index is 41.37 kg/m².  Wt Readings from Last 3 Encounters:   01/09/24 135 kg (296 lb 9.6 oz)   12/27/23 (!) 148 kg (326 lb)   12/14/23 (!) 148 kg (326 lb)        Intake/Output Summary (Last 24 hours) at 1/19/2024 0915  Last data filed at 1/19/2024 0512  Gross per 24 hour   Intake  1160.1 ml   Output 2550 ml   Net -1389.9 ml     Diet: Diabetic Diets; Consistent Carbohydrate; Texture: Regular Texture (IDDSI 7); Fluid Consistency: Thin (IDDSI 0)  ----------------------------------------------------------------------------------------------------------------------      Physical Exam:    Constitutional: Chronically ill-appearing  male.  Sitting up in bed this morning eating breakfast.  Currently on 3 L per nasal cannula.  HENT:  Head: Normocephalic and atraumatic.  Mouth:  Moist mucous membranes.    Eyes:  Conjunctivae and EOM are normal.  No scleral icterus.  Neck:  Neck supple.  No JVD present.    Cardiovascular:  Normal rate, regular rhythm and normal heart sounds with no murmur. No edema.  Pulmonary/Chest: Mild scattered rhonchi noted bilaterally in the bases.  On 3 L per nasal cannula.  Abdominal:  Soft.  Bowel sounds are normal.  No distension and no tenderness.  Ostomy.  Musculoskeletal: Left AKA.  Functional paraplegia.  Neurological:  Alert and oriented to person, confused   Skin:  Skin is warm and dry.  No rash noted.  No pallor.  Sacral decubitus ulcer, left gluteal pressure ulcer with no evidence of active infection. Right lower gluteal ulcer with necrotic tissue present.  Wounds are dressed, clean dry and intact.        ----------------------------------------------------------------------------------------------------------------------          Results from last 7 days   Lab Units 01/18/24  0750   PH, ARTERIAL pH units 7.339*   PO2 ART mm Hg 86.3   PCO2, ARTERIAL mm Hg 52.5*   HCO3 ART mmol/L 28.3*     Results from last 7 days   Lab Units 01/19/24  0206 01/18/24  0205 01/17/24  0235   WBC 10*3/mm3 8.85 8.40 7.40   HEMOGLOBIN g/dL 8.9* 8.6* 8.8*   HEMATOCRIT % 33.4* 31.1* 33.8*   MCV fL 86.3 85.9 89.4   MCHC g/dL 26.6* 27.7* 26.0*   PLATELETS 10*3/mm3 374 335 353     Results from last 7 days   Lab Units 01/19/24  0206 01/18/24  0205 01/17/24  0235   SODIUM mmol/L 140 142 141  "  POTASSIUM mmol/L 4.7 3.8 3.8   CHLORIDE mmol/L 106 105 105   CO2 mmol/L 25.4 30.4* 26.4   BUN mg/dL 32* 37* 31*   CREATININE mg/dL 0.86 0.97 0.85   CALCIUM mg/dL 9.0 8.8 9.0   GLUCOSE mg/dL 117* 98 105*   Estimated Creatinine Clearance: 150.6 mL/min (by C-G formula based on SCr of 0.86 mg/dL).  No results found for: \"AMMONIA\"      Glucose   Date/Time Value Ref Range Status   01/19/2024 0644 107 70 - 130 mg/dL Final   01/18/2024 2125 130 70 - 130 mg/dL Final   01/18/2024 1627 156 (H) 70 - 130 mg/dL Final   01/18/2024 1057 115 70 - 130 mg/dL Final   01/18/2024 0613 93 70 - 130 mg/dL Final   01/17/2024 2051 131 (H) 70 - 130 mg/dL Final   01/17/2024 1636 112 70 - 130 mg/dL Final   01/17/2024 1034 97 70 - 130 mg/dL Final     Lab Results   Component Value Date    HGBA1C 11.50 (H) 02/13/2023     Lab Results   Component Value Date    TSH 3.780 07/19/2022    FREET4 1.31 06/02/2021       Blood Culture   Date Value Ref Range Status   01/08/2024 No growth at 24 hours  Preliminary   01/08/2024 No growth at 24 hours  Preliminary     No results found for: \"URINECX\"  No results found for: \"WOUNDCX\"  No results found for: \"STOOLCX\"  No results found for: \"RESPCX\"  Pain Management Panel  More data exists         Latest Ref Rng & Units 1/13/2024 4/9/2023   Pain Management Panel   Creatinine, Urine mg/dL - 79.5    Amphetamine, Urine Qual Negative Negative  -   Barbiturates Screen, Urine Negative Negative  -   Benzodiazepine Screen, Urine Negative Negative  -   Buprenorphine, Screen, Urine Negative Negative  -   Cocaine Screen, Urine Negative Negative  -   Fentanyl, Urine Negative Negative  -   Methadone Screen , Urine Negative Negative  -   Methamphetamine, Ur Negative Negative  -         ----------------------------------------------------------------------------------------------------------------------  Imaging Results (Last 24 Hours)       ** No results found for the last 24 hours. **      "       ----------------------------------------------------------------------------------------------------------------------    Pertinent Infectious Disease Results      Assessment/Plan       Assessment     Severe sepsis with lactic acid greater than 2 on admission  Acute on chronic osteomyelitis        Plan        Patient sitting up in bed this morning eating breakfast.  On decreased oxygen requirement of 3 L per nasal cannula with no apparent distress.  Mild scattered rhonchi noted in the bases bilaterally, overall improved.  Patient reports improved cough with continued production of sputum.  Abdomen distended, nontender.  Reports no increase in ostomy output.  Afebrile.  WBC 8.85.  Respiratory culture from 1/18/2024 currently in process.    The recommendation is to continue the course of Zosyn at 3.375 g IV every 8 hours for a total of 7 days, with a tentative end date of 1/21/2024. Once the Zosyn course is completed, the plan is to resume ceftriaxone at 2 g IV every 24 hours as scheduled through 2/18/2024 for sacral osteomyelitis. Close monitoring will be maintained throughout this process. Additionally, we are currently awaiting a referral to a Long-Term Acute Care facility.      ANTIMICROBIAL THERAPY    methenamine - 1 g, 1 g  piperacillin-tazobactam (ZOSYN) IVPB 3.375 g in 100 mL NS VTB     Code Status:   Code Status and Medical Interventions:   Ordered at: 01/09/24 0247     Code Status (Patient has no pulse and is not breathing):    CPR (Attempt to Resuscitate)     Medical Interventions (Patient has pulse or is breathing):    Full Support       Verenice Deluca, APRN  01/19/24  09:15 EST

## 2024-01-19 NOTE — NURSING NOTE
Pt's home medications, vericiguat and methenamine, sent to [facility].     Pt's home bipap sent ot [facility].    Pt's brother at bedside and is talking pt's motorized chair.

## 2024-01-19 NOTE — DISCHARGE SUMMARY
[facility] HOSPITALISTS DISCHARGE SUMMARY    Patient Identification:  Name:  Ken Krueger  Age:  48 y.o.  Sex:  male  :  1975  MRN:  [id number]  Visit Number:  [id number]    Date of Admission: 2024  Date of Discharge:  2024     PCP: Franchesca Hodges APRN    DISCHARGE DIAGNOSIS  Acute on chronic osteomyelitis - Improved   Pneumonia, Bacterial, treating for Aspiration - Improving   Acute Hypercarbic Respiratory Failure requiring Non-invasive Positive Pressure Ventilation - Improved   Severe sepsis - Sohail  Chronic sacral decubitus ulcer status post debridement - Improved   Mild confusion - Resolved   Chronic HFpEF/HFrEF   Essential hypertension  Type 2 diabetes mellitus  Morbid obesity by BMI     CONSULTS   Consults       Date and Time Order Name Status Description    2024 11:18 AM Inpatient Pulmonology Consult Completed     2024  4:43 AM Inpatient Infectious Diseases Consult Completed           PROCEDURES PERFORMED  Surgical debridement     HOSPITAL COURSE  Patient is a 48 y.o. male presented to [facility] complaining of sore buttock.  Please see the admitting history and physical for further details.      #Sacral decubitus ulcer  General Surgery consulted and followed, bedside debridement performed during this hospitalization. Reportedly patient is not an appropriate candidate for wound VAC at this time.  Continue wound care per wound care nurse practitioner recommendations.  Wound culture demonstrated E. coli and Proteus sp, patient had been on Flagyl and Rocephin per ID recommendations, though now on Zosyn for Pneumonia as per below.  Due to his respiratory issues, need for long term antibiotics and continued significant wound care, patient is being admitted to LTACH today.      #Acute on chronic osteomyelitis, now with concern for Pneumonia, Bacterial, treating for Aspiration  Patient had been on Flagyl and Ceftriaxone for acute on chronic osteomyelitis, then  developed increased respiratory symptoms and developed copious amounts of sputum, antibiotics were switched to Zosyn with respiratory culture pending, Infectious Disease consulted and following, will continue Zosyn x 7 day unless culture allows de-escalation, would plan on resuming Ceftriaxone and Flagyl thereafter per Infectious Disease recommendations with Ceftriaxone through 2/18 and Flagyl through 1/21.    #Acute Hypercarbic Respiratory Failure requiring Non-invasive Positive Pressure Ventilation    Pulmonary consulted and followed, suspect due to obesity and Pneumonia, continue breathing treatments and BiPAP nightly and as needed      #Severe sepsis (SHILPA) secondary to chronic sacral decubitus ulcer  Treatment as per above     #Chronic HFpEF/HFrEF   No evidence of exacerbation at this time     #Essential hypertension  Continued current regimen, controlled     #Type 2 diabetes mellitus  Continued FSBG and SSI      #Morbid obesity  Complicates all aspects of care     #Mild confusion - Resolved   Likely secondary to mild hospital associated delirium    Edited by: Primitivo Cho MD at 1/19/2024 1302    VITAL SIGNS:  Temp:  [97.8 °F (36.6 °C)-98 °F (36.7 °C)] 97.8 °F (36.6 °C)  Heart Rate:  [] 97  Resp:  [18-26] 19  BP: ()/(50-68) 104/63  SpO2:  [92 %-98 %] 93 %  on  Flow (L/min):  [1-6] 1;   Device (Oxygen Therapy): nasal cannula    Body mass index is 41.37 kg/m².  Wt Readings from Last 3 Encounters:   01/09/24 135 kg (296 lb 9.6 oz)   12/27/23 (!) 148 kg (326 lb)   12/14/23 (!) 148 kg (326 lb)     PHYSICAL EXAM:  Constitutional:  older than stated age.  No acute distress.      HENT:  Head:  Normocephalic and atraumatic.  Mouth:  Moist mucous membranes.    Eyes:  Conjunctivae and EOM are normal. No scleral icterus.    Neck:  Neck supple.  No JVD present.    Cardiovascular:  Normal rate, regular rhythm and normal heart sounds with no murmur.  Pulmonary/Chest:  No respiratory distress, no wheezes, no  crackles, on HFNC  Abdominal:  Soft. No distension and no tenderness.   Musculoskeletal:  No tenderness.  No red or swollen joints anywhere.    Neurological:  Alert and oriented to person, place.  No gross focal deficits     Skin:  Skin is warm and dry. No rash noted. No pallor.   Peripheral vascular:  No clubbing, no cyanosis, no edema.  Psychiatric: Appropriate mood and affect    *Examination stable today 1/19    Edited by: Primitivo Cho MD at 1/19/2024 6904    DISCHARGE DISPOSITION   Stable    DISCHARGE MEDICATIONS:     Discharge Medications        New Medications        Instructions Start Date   Insulin Lispro 100 UNIT/ML injection  Commonly known as: humaLOG   2-7 Units, Subcutaneous, 4 Times Daily Before Meals & Nightly      ipratropium-albuterol 0.5-2.5 mg/3 ml nebulizer  Commonly known as: DUO-NEB   3 mL, Nebulization, 4 Times Daily - RT      ipratropium-albuterol 0.5-2.5 mg/3 ml nebulizer  Commonly known as: DUO-NEB   3 mL, Nebulization, Every 4 Hours PRN      sodium chloride 0.9 % solution 100 mL with piperacillin-tazobactam 3.375 (3-0.375) g reconstituted solution 3.375 g IVPB   3.375 g, Intravenous, Every 8 Hours             Changes to Medications        Instructions Start Date   gabapentin 800 MG tablet  Commonly known as: NEURONTIN  What changed: how much to take   400 mg, Oral, 3 Times Daily             Continue These Medications        Instructions Start Date   ARIPiprazole 10 MG tablet  Commonly known as: ABILIFY   10 mg, Oral, Nightly      atorvastatin 10 MG tablet  Commonly known as: LIPITOR   10 mg, Oral, Nightly      baclofen 20 MG tablet  Commonly known as: LIORESAL   30 mg, Oral, 4 Times Daily With Meals & Nightly      bumetanide 2 MG tablet  Commonly known as: BUMEX   2 mg, Oral, Daily      carvedilol 12.5 MG tablet  Commonly known as: Coreg   12.5 mg, Oral, 2 Times Daily With Meals      dantrolene 25 MG capsule  Commonly known as: DANTRIUM   100 mg, Oral, 3 Times Daily      DULoxetine 60 MG  capsule  Commonly known as: CYMBALTA   60 mg, Oral, 2 Times Daily      Entresto 24-26 MG tablet  Generic drug: sacubitril-valsartan   1 tablet, Oral, 2 Times Daily      ferrous sulfate 325 (65 FE) MG tablet   325 mg, Oral, 2 Times Daily      furosemide 20 MG tablet  Commonly known as: LASIX   20 mg, Oral, Daily PRN      ipratropium 0.02 % nebulizer solution  Commonly known as: ATROVENT   500 mcg, Nebulization, Every 4 Hours PRN      methenamine 1 g tablet  Commonly known as: HIPREX   1 g, Oral, 2 Times Daily With Meals      metOLazone 2.5 MG tablet  Commonly known as: ZAROXOLYN   5 mg, Oral, 3 Times Weekly      modafinil 200 MG tablet  Commonly known as: PROVIGIL   200 mg, Oral, Daily      multivitamin with minerals tablet tablet   1 tablet, Oral, Daily      omeprazole 40 MG capsule  Commonly known as: priLOSEC   40 mg, Oral, 2 Times Daily      potassium chloride 10 MEQ CR tablet   10 mEq, Oral, Daily      spironolactone 25 MG tablet  Commonly known as: ALDACTONE   25 mg, Oral, Daily             Stop These Medications      albuterol 1.25 MG/3ML nebulizer solution  Commonly known as: ACCUNEB     amoxicillin-clavulanate 500-125 MG per tablet  Commonly known as: Augmentin     apixaban 5 MG tablet tablet  Commonly known as: ELIQUIS     metFORMIN 1000 MG tablet  Commonly known as: GLUCOPHAGE     Ozempic (1 MG/DOSE) 2 MG/1.5ML solution pen-injector  Generic drug: Semaglutide (1 MG/DOSE)     tiZANidine 2 MG tablet  Commonly known as: ZANAFLEX     Verquvo 10 MG tablet  Generic drug: Vericiguat             Follow-up Information       Verenice Deluca, APRN. Schedule an appointment as soon as possible for a visit in 1 week(s).    Specialties: Infectious Diseases, Nurse Practitioner  Contact information:  [address]  [address] [address]  [phone]               Verenice Deluca APRN .    Specialties: Infectious Diseases, Nurse Practitioner  Contact information:  [address]  [address]  [address]  [phone]               Franchesca Hodges APRN .    Specialty: Nurse Practitioner  Contact information:  [address] [facility] [address]  [phone]                            TEST  RESULTS PENDING AT DISCHARGE  Pending Labs       Order Current Status    Respiratory Culture - Sputum, Cough Preliminary result          CODE STATUS  Code Status and Medical Interventions:   Ordered at: 01/09/24 0247     Code Status (Patient has no pulse and is not breathing):    CPR (Attempt to Resuscitate)     Medical Interventions (Patient has pulse or is breathing):    Full Support     Primitivo Cho MD  [facility]  01/19/24  13:02 EST    Please note that this discharge summary required more than 30 minutes to complete.

## 2024-01-19 NOTE — NURSING NOTE
Pt hussein to be transferred to [facility] with sand bed per Austen, wound care, RN.    Report called to Kiesha at [facility].

## 2024-01-20 ENCOUNTER — OUTSIDE FACILITY SERVICE (OUTPATIENT)
Dept: PULMONOLOGY | Facility: CLINIC | Age: 49
End: 2024-01-20
Payer: MEDICARE

## 2024-01-20 LAB
ALBUMIN SERPL-MCNC: 3 G/DL (ref 3.5–5.2)
ALBUMIN/GLOB SERPL: 0.7 G/DL
ALP SERPL-CCNC: 101 U/L (ref 39–117)
ALT SERPL W P-5'-P-CCNC: 8 U/L (ref 1–41)
ANION GAP SERPL CALCULATED.3IONS-SCNC: 9.3 MMOL/L (ref 5–15)
ANISOCYTOSIS BLD QL: NORMAL
AST SERPL-CCNC: 11 U/L (ref 1–40)
BACTERIA SPEC RESP CULT: NORMAL
BASOPHILS # BLD AUTO: 0.02 10*3/MM3 (ref 0–0.2)
BASOPHILS NFR BLD AUTO: 0.3 % (ref 0–1.5)
BILIRUB SERPL-MCNC: 0.2 MG/DL (ref 0–1.2)
BUN SERPL-MCNC: 24 MG/DL (ref 6–20)
BUN/CREAT SERPL: 38.7 (ref 7–25)
CALCIUM SPEC-SCNC: 8.9 MG/DL (ref 8.6–10.5)
CHLORIDE SERPL-SCNC: 106 MMOL/L (ref 98–107)
CO2 SERPL-SCNC: 27.7 MMOL/L (ref 22–29)
CREAT SERPL-MCNC: 0.62 MG/DL (ref 0.76–1.27)
CRP SERPL-MCNC: 3.59 MG/DL (ref 0–0.5)
DACRYOCYTES BLD QL SMEAR: NORMAL
DEPRECATED RDW RBC AUTO: 57.6 FL (ref 37–54)
EGFRCR SERPLBLD CKD-EPI 2021: 119.4 ML/MIN/1.73
EOSINOPHIL # BLD AUTO: 0.19 10*3/MM3 (ref 0–0.4)
EOSINOPHIL NFR BLD AUTO: 3.3 % (ref 0.3–6.2)
ERYTHROCYTE [DISTWIDTH] IN BLOOD BY AUTOMATED COUNT: 20 % (ref 12.3–15.4)
GLOBULIN UR ELPH-MCNC: 4.2 GM/DL
GLUCOSE BLDC GLUCOMTR-MCNC: 111 MG/DL (ref 70–130)
GLUCOSE BLDC GLUCOMTR-MCNC: 121 MG/DL (ref 70–130)
GLUCOSE BLDC GLUCOMTR-MCNC: 143 MG/DL (ref 70–130)
GLUCOSE BLDC GLUCOMTR-MCNC: 82 MG/DL (ref 70–130)
GLUCOSE SERPL-MCNC: 85 MG/DL (ref 65–99)
GRAM STN SPEC: NORMAL
HCT VFR BLD AUTO: 31.2 % (ref 37.5–51)
HGB BLD-MCNC: 8.4 G/DL (ref 13–17.7)
HYPOCHROMIA BLD QL: NORMAL
IMM GRANULOCYTES # BLD AUTO: 0.03 10*3/MM3 (ref 0–0.05)
IMM GRANULOCYTES NFR BLD AUTO: 0.5 % (ref 0–0.5)
LYMPHOCYTES # BLD AUTO: 1.4 10*3/MM3 (ref 0.7–3.1)
LYMPHOCYTES NFR BLD AUTO: 24.1 % (ref 19.6–45.3)
MCH RBC QN AUTO: 23.5 PG (ref 26.6–33)
MCHC RBC AUTO-ENTMCNC: 26.9 G/DL (ref 31.5–35.7)
MCV RBC AUTO: 87.2 FL (ref 79–97)
MONOCYTES # BLD AUTO: 0.28 10*3/MM3 (ref 0.1–0.9)
MONOCYTES NFR BLD AUTO: 4.8 % (ref 5–12)
NEUTROPHILS NFR BLD AUTO: 3.9 10*3/MM3 (ref 1.7–7)
NEUTROPHILS NFR BLD AUTO: 67 % (ref 42.7–76)
NRBC BLD AUTO-RTO: 0 /100 WBC (ref 0–0.2)
OVALOCYTES BLD QL SMEAR: NORMAL
PLAT MORPH BLD: NORMAL
PLATELET # BLD AUTO: 273 10*3/MM3 (ref 140–450)
PMV BLD AUTO: 9.6 FL (ref 6–12)
POTASSIUM SERPL-SCNC: 3.6 MMOL/L (ref 3.5–5.2)
PREALB SERPL-MCNC: 20.1 MG/DL (ref 20–40)
PROT SERPL-MCNC: 7.2 G/DL (ref 6–8.5)
RBC # BLD AUTO: 3.58 10*6/MM3 (ref 4.14–5.8)
SODIUM SERPL-SCNC: 143 MMOL/L (ref 136–145)
WBC NRBC COR # BLD AUTO: 5.82 10*3/MM3 (ref 3.4–10.8)

## 2024-01-20 PROCEDURE — 85025 COMPLETE CBC W/AUTO DIFF WBC: CPT | Performed by: INTERNAL MEDICINE

## 2024-01-20 PROCEDURE — 86140 C-REACTIVE PROTEIN: CPT | Performed by: INTERNAL MEDICINE

## 2024-01-20 PROCEDURE — 82948 REAGENT STRIP/BLOOD GLUCOSE: CPT

## 2024-01-20 PROCEDURE — 85007 BL SMEAR W/DIFF WBC COUNT: CPT | Performed by: INTERNAL MEDICINE

## 2024-01-20 PROCEDURE — 84134 ASSAY OF PREALBUMIN: CPT | Performed by: INTERNAL MEDICINE

## 2024-01-20 PROCEDURE — 36415 COLL VENOUS BLD VENIPUNCTURE: CPT | Performed by: INTERNAL MEDICINE

## 2024-01-20 PROCEDURE — 80053 COMPREHEN METABOLIC PANEL: CPT | Performed by: INTERNAL MEDICINE

## 2024-01-21 ENCOUNTER — OUTSIDE FACILITY SERVICE (OUTPATIENT)
Dept: PULMONOLOGY | Facility: CLINIC | Age: 49
End: 2024-01-21
Payer: MEDICARE

## 2024-01-21 LAB
GLUCOSE BLDC GLUCOMTR-MCNC: 113 MG/DL (ref 70–130)
GLUCOSE BLDC GLUCOMTR-MCNC: 117 MG/DL (ref 70–130)
GLUCOSE BLDC GLUCOMTR-MCNC: 119 MG/DL (ref 70–130)
GLUCOSE BLDC GLUCOMTR-MCNC: 93 MG/DL (ref 70–130)
QT INTERVAL: 418 MS
QTC INTERVAL: 493 MS

## 2024-01-21 PROCEDURE — 87205 SMEAR GRAM STAIN: CPT | Performed by: INTERNAL MEDICINE

## 2024-01-21 PROCEDURE — 82948 REAGENT STRIP/BLOOD GLUCOSE: CPT

## 2024-01-21 PROCEDURE — 87070 CULTURE OTHR SPECIMN AEROBIC: CPT | Performed by: INTERNAL MEDICINE

## 2024-01-22 ENCOUNTER — OUTSIDE FACILITY SERVICE (OUTPATIENT)
Dept: PULMONOLOGY | Facility: CLINIC | Age: 49
End: 2024-01-22
Payer: MEDICARE

## 2024-01-22 ENCOUNTER — OUTSIDE FACILITY SERVICE (OUTPATIENT)
Dept: INFECTIOUS DISEASES | Facility: CLINIC | Age: 49
End: 2024-01-22
Payer: MEDICARE

## 2024-01-22 LAB
GLUCOSE BLDC GLUCOMTR-MCNC: 123 MG/DL (ref 70–130)
GLUCOSE BLDC GLUCOMTR-MCNC: 129 MG/DL (ref 70–130)
GLUCOSE BLDC GLUCOMTR-MCNC: 131 MG/DL (ref 70–130)
GLUCOSE BLDC GLUCOMTR-MCNC: 83 MG/DL (ref 70–130)

## 2024-01-22 PROCEDURE — 82948 REAGENT STRIP/BLOOD GLUCOSE: CPT

## 2024-01-23 ENCOUNTER — APPOINTMENT (OUTPATIENT)
Dept: GENERAL RADIOLOGY | Facility: HOSPITAL | Age: 49
End: 2024-01-23
Payer: COMMERCIAL

## 2024-01-23 ENCOUNTER — OUTSIDE FACILITY SERVICE (OUTPATIENT)
Dept: INFECTIOUS DISEASES | Facility: CLINIC | Age: 49
End: 2024-01-23
Payer: MEDICARE

## 2024-01-23 ENCOUNTER — OUTSIDE FACILITY SERVICE (OUTPATIENT)
Dept: PULMONOLOGY | Facility: CLINIC | Age: 49
End: 2024-01-23
Payer: MEDICARE

## 2024-01-23 LAB
ALBUMIN SERPL-MCNC: 3.3 G/DL (ref 3.5–5.2)
ALBUMIN/GLOB SERPL: 0.8 G/DL
ALP SERPL-CCNC: 106 U/L (ref 39–117)
ALT SERPL W P-5'-P-CCNC: 9 U/L (ref 1–41)
ANION GAP SERPL CALCULATED.3IONS-SCNC: 7 MMOL/L (ref 5–15)
ANISOCYTOSIS BLD QL: NORMAL
AST SERPL-CCNC: 11 U/L (ref 1–40)
BACTERIA SPEC AEROBE CULT: ABNORMAL
BASOPHILS # BLD AUTO: 0.02 10*3/MM3 (ref 0–0.2)
BASOPHILS NFR BLD AUTO: 0.3 % (ref 0–1.5)
BILIRUB SERPL-MCNC: 0.2 MG/DL (ref 0–1.2)
BUN SERPL-MCNC: 25 MG/DL (ref 6–20)
BUN/CREAT SERPL: 26.3 (ref 7–25)
CALCIUM SPEC-SCNC: 9.1 MG/DL (ref 8.6–10.5)
CHLORIDE SERPL-SCNC: 103 MMOL/L (ref 98–107)
CO2 SERPL-SCNC: 32 MMOL/L (ref 22–29)
CREAT SERPL-MCNC: 0.95 MG/DL (ref 0.76–1.27)
CRP SERPL-MCNC: 2.81 MG/DL (ref 0–0.5)
CRP SERPL-MCNC: 2.9 MG/DL (ref 0–0.5)
DEPRECATED RDW RBC AUTO: 63.8 FL (ref 37–54)
EGFRCR SERPLBLD CKD-EPI 2021: 100 ML/MIN/1.73
EOSINOPHIL # BLD AUTO: 0.21 10*3/MM3 (ref 0–0.4)
EOSINOPHIL NFR BLD AUTO: 2.7 % (ref 0.3–6.2)
ERYTHROCYTE [DISTWIDTH] IN BLOOD BY AUTOMATED COUNT: 21.4 % (ref 12.3–15.4)
GLOBULIN UR ELPH-MCNC: 4.1 GM/DL
GLUCOSE BLDC GLUCOMTR-MCNC: 117 MG/DL (ref 70–130)
GLUCOSE BLDC GLUCOMTR-MCNC: 119 MG/DL (ref 70–130)
GLUCOSE BLDC GLUCOMTR-MCNC: 127 MG/DL (ref 70–130)
GLUCOSE BLDC GLUCOMTR-MCNC: 211 MG/DL (ref 70–130)
GLUCOSE SERPL-MCNC: 108 MG/DL (ref 65–99)
GRAM STN SPEC: ABNORMAL
HCT VFR BLD AUTO: 32.7 % (ref 37.5–51)
HGB BLD-MCNC: 8.9 G/DL (ref 13–17.7)
HYPOCHROMIA BLD QL: NORMAL
IMM GRANULOCYTES # BLD AUTO: 0.02 10*3/MM3 (ref 0–0.05)
IMM GRANULOCYTES NFR BLD AUTO: 0.3 % (ref 0–0.5)
LYMPHOCYTES # BLD AUTO: 1.39 10*3/MM3 (ref 0.7–3.1)
LYMPHOCYTES NFR BLD AUTO: 17.7 % (ref 19.6–45.3)
MCH RBC QN AUTO: 23.7 PG (ref 26.6–33)
MCHC RBC AUTO-ENTMCNC: 27.2 G/DL (ref 31.5–35.7)
MCV RBC AUTO: 87 FL (ref 79–97)
MONOCYTES # BLD AUTO: 0.28 10*3/MM3 (ref 0.1–0.9)
MONOCYTES NFR BLD AUTO: 3.6 % (ref 5–12)
NEUTROPHILS NFR BLD AUTO: 5.93 10*3/MM3 (ref 1.7–7)
NEUTROPHILS NFR BLD AUTO: 75.4 % (ref 42.7–76)
NRBC BLD AUTO-RTO: 0 /100 WBC (ref 0–0.2)
OVALOCYTES BLD QL SMEAR: NORMAL
PLATELET # BLD AUTO: 287 10*3/MM3 (ref 140–450)
PMV BLD AUTO: 9.6 FL (ref 6–12)
POTASSIUM SERPL-SCNC: 3.9 MMOL/L (ref 3.5–5.2)
PROT SERPL-MCNC: 7.4 G/DL (ref 6–8.5)
RBC # BLD AUTO: 3.76 10*6/MM3 (ref 4.14–5.8)
SMALL PLATELETS BLD QL SMEAR: ADEQUATE
SODIUM SERPL-SCNC: 142 MMOL/L (ref 136–145)
WBC NRBC COR # BLD AUTO: 7.85 10*3/MM3 (ref 3.4–10.8)

## 2024-01-23 PROCEDURE — 85007 BL SMEAR W/DIFF WBC COUNT: CPT | Performed by: PHYSICIAN ASSISTANT

## 2024-01-23 PROCEDURE — 36415 COLL VENOUS BLD VENIPUNCTURE: CPT | Performed by: PHYSICIAN ASSISTANT

## 2024-01-23 PROCEDURE — 86140 C-REACTIVE PROTEIN: CPT | Performed by: PHYSICIAN ASSISTANT

## 2024-01-23 PROCEDURE — 71045 X-RAY EXAM CHEST 1 VIEW: CPT

## 2024-01-23 PROCEDURE — 85025 COMPLETE CBC W/AUTO DIFF WBC: CPT | Performed by: PHYSICIAN ASSISTANT

## 2024-01-23 PROCEDURE — 80053 COMPREHEN METABOLIC PANEL: CPT | Performed by: PHYSICIAN ASSISTANT

## 2024-01-23 PROCEDURE — 71045 X-RAY EXAM CHEST 1 VIEW: CPT | Performed by: RADIOLOGY

## 2024-01-23 PROCEDURE — 82948 REAGENT STRIP/BLOOD GLUCOSE: CPT

## 2024-01-24 ENCOUNTER — OUTSIDE FACILITY SERVICE (OUTPATIENT)
Dept: PULMONOLOGY | Facility: CLINIC | Age: 49
End: 2024-01-24
Payer: MEDICARE

## 2024-01-24 ENCOUNTER — OUTSIDE FACILITY SERVICE (OUTPATIENT)
Dept: INFECTIOUS DISEASES | Facility: CLINIC | Age: 49
End: 2024-01-24
Payer: MEDICARE

## 2024-01-24 LAB
A-A DO2: 155.8 MMHG (ref 0–300)
ALBUMIN SERPL-MCNC: 3.2 G/DL (ref 3.5–5.2)
ALBUMIN/GLOB SERPL: 0.8 G/DL
ALP SERPL-CCNC: 105 U/L (ref 39–117)
ALT SERPL W P-5'-P-CCNC: 9 U/L (ref 1–41)
ANION GAP SERPL CALCULATED.3IONS-SCNC: 6.7 MMOL/L (ref 5–15)
ANISOCYTOSIS BLD QL: NORMAL
ARTERIAL PATENCY WRIST A: POSITIVE
AST SERPL-CCNC: 9 U/L (ref 1–40)
ATMOSPHERIC PRESS: 733 MMHG
BACTERIA SPEC AEROBE CULT: ABNORMAL
BACTERIA SPEC AEROBE CULT: ABNORMAL
BASE EXCESS BLDA CALC-SCNC: 2.7 MMOL/L (ref 0–2)
BASOPHILS # BLD AUTO: 0.02 10*3/MM3 (ref 0–0.2)
BASOPHILS NFR BLD AUTO: 0.2 % (ref 0–1.5)
BDY SITE: ABNORMAL
BILIRUB SERPL-MCNC: 0.3 MG/DL (ref 0–1.2)
BUN SERPL-MCNC: 35 MG/DL (ref 6–20)
BUN/CREAT SERPL: 52.2 (ref 7–25)
CALCIUM SPEC-SCNC: 8.9 MG/DL (ref 8.6–10.5)
CHLORIDE SERPL-SCNC: 103 MMOL/L (ref 98–107)
CO2 BLDA-SCNC: 31.1 MMOL/L (ref 22–33)
CO2 SERPL-SCNC: 31.3 MMOL/L (ref 22–29)
COHGB MFR BLD: 1.6 % (ref 0–5)
CREAT SERPL-MCNC: 0.67 MG/DL (ref 0.76–1.27)
CRP SERPL-MCNC: 3.57 MG/DL (ref 0–0.5)
DEPRECATED RDW RBC AUTO: 66.6 FL (ref 37–54)
EGFRCR SERPLBLD CKD-EPI 2021: 116.6 ML/MIN/1.73
EOSINOPHIL # BLD AUTO: 0.12 10*3/MM3 (ref 0–0.4)
EOSINOPHIL NFR BLD AUTO: 1.2 % (ref 0.3–6.2)
ERYTHROCYTE [DISTWIDTH] IN BLOOD BY AUTOMATED COUNT: 21.7 % (ref 12.3–15.4)
GAS FLOW AIRWAY: 8 LPM
GLOBULIN UR ELPH-MCNC: 4 GM/DL
GLUCOSE BLDC GLUCOMTR-MCNC: 121 MG/DL (ref 70–130)
GLUCOSE BLDC GLUCOMTR-MCNC: 125 MG/DL (ref 70–130)
GLUCOSE BLDC GLUCOMTR-MCNC: 126 MG/DL (ref 70–130)
GLUCOSE BLDC GLUCOMTR-MCNC: 134 MG/DL (ref 70–130)
GLUCOSE SERPL-MCNC: 122 MG/DL (ref 65–99)
GRAM STN SPEC: ABNORMAL
GRAM STN SPEC: ABNORMAL
HCO3 BLDA-SCNC: 29.4 MMOL/L (ref 20–26)
HCT VFR BLD AUTO: 35.1 % (ref 37.5–51)
HCT VFR BLD CALC: 29.5 % (ref 38–51)
HGB BLD-MCNC: 9.5 G/DL (ref 13–17.7)
HGB BLDA-MCNC: 9.6 G/DL (ref 14–18)
HYPOCHROMIA BLD QL: NORMAL
IMM GRANULOCYTES # BLD AUTO: 0.03 10*3/MM3 (ref 0–0.05)
IMM GRANULOCYTES NFR BLD AUTO: 0.3 % (ref 0–0.5)
INHALED O2 CONCENTRATION: 50 %
LARGE PLATELETS: NORMAL
LYMPHOCYTES # BLD AUTO: 1.01 10*3/MM3 (ref 0.7–3.1)
LYMPHOCYTES NFR BLD AUTO: 10.2 % (ref 19.6–45.3)
Lab: ABNORMAL
MCH RBC QN AUTO: 23.6 PG (ref 26.6–33)
MCHC RBC AUTO-ENTMCNC: 27.1 G/DL (ref 31.5–35.7)
MCV RBC AUTO: 87.3 FL (ref 79–97)
METHGB BLD QL: 0 % (ref 0–3)
MODALITY: ABNORMAL
MONOCYTES # BLD AUTO: 0.24 10*3/MM3 (ref 0.1–0.9)
MONOCYTES NFR BLD AUTO: 2.4 % (ref 5–12)
NEUTROPHILS NFR BLD AUTO: 8.45 10*3/MM3 (ref 1.7–7)
NEUTROPHILS NFR BLD AUTO: 85.7 % (ref 42.7–76)
NOTE: ABNORMAL
NOTIFIED BY: ABNORMAL
NOTIFIED WHO: ABNORMAL
NRBC BLD AUTO-RTO: 0 /100 WBC (ref 0–0.2)
OXYHGB MFR BLDV: 97.6 % (ref 94–99)
PCO2 BLDA: 55.8 MM HG (ref 35–45)
PCO2 TEMP ADJ BLD: ABNORMAL MM[HG]
PH BLDA: 7.33 PH UNITS (ref 7.35–7.45)
PH, TEMP CORRECTED: ABNORMAL
PLATELET # BLD AUTO: 300 10*3/MM3 (ref 140–450)
PMV BLD AUTO: 9.9 FL (ref 6–12)
PO2 BLDA: 127 MM HG (ref 83–108)
PO2 TEMP ADJ BLD: ABNORMAL MM[HG]
POTASSIUM SERPL-SCNC: 4.5 MMOL/L (ref 3.5–5.2)
PROT SERPL-MCNC: 7.2 G/DL (ref 6–8.5)
RBC # BLD AUTO: 4.02 10*6/MM3 (ref 4.14–5.8)
SAO2 % BLDCOA: 99.1 % (ref 94–99)
SODIUM SERPL-SCNC: 141 MMOL/L (ref 136–145)
VENTILATOR MODE: ABNORMAL
WBC NRBC COR # BLD AUTO: 9.87 10*3/MM3 (ref 3.4–10.8)

## 2024-01-24 PROCEDURE — 83050 HGB METHEMOGLOBIN QUAN: CPT

## 2024-01-24 PROCEDURE — 82375 ASSAY CARBOXYHB QUANT: CPT

## 2024-01-24 PROCEDURE — 82948 REAGENT STRIP/BLOOD GLUCOSE: CPT

## 2024-01-24 PROCEDURE — 36415 COLL VENOUS BLD VENIPUNCTURE: CPT | Performed by: PHYSICIAN ASSISTANT

## 2024-01-24 PROCEDURE — 82805 BLOOD GASES W/O2 SATURATION: CPT

## 2024-01-24 PROCEDURE — 85025 COMPLETE CBC W/AUTO DIFF WBC: CPT | Performed by: PHYSICIAN ASSISTANT

## 2024-01-24 PROCEDURE — 80053 COMPREHEN METABOLIC PANEL: CPT | Performed by: PHYSICIAN ASSISTANT

## 2024-01-24 PROCEDURE — 86140 C-REACTIVE PROTEIN: CPT | Performed by: PHYSICIAN ASSISTANT

## 2024-01-24 PROCEDURE — 85007 BL SMEAR W/DIFF WBC COUNT: CPT | Performed by: PHYSICIAN ASSISTANT

## 2024-01-25 ENCOUNTER — APPOINTMENT (OUTPATIENT)
Dept: CT IMAGING | Facility: HOSPITAL | Age: 49
End: 2024-01-25
Payer: COMMERCIAL

## 2024-01-25 ENCOUNTER — APPOINTMENT (OUTPATIENT)
Dept: GENERAL RADIOLOGY | Facility: HOSPITAL | Age: 49
End: 2024-01-25
Payer: MEDICARE

## 2024-01-25 ENCOUNTER — OUTSIDE FACILITY SERVICE (OUTPATIENT)
Dept: INFECTIOUS DISEASES | Facility: CLINIC | Age: 49
End: 2024-01-25
Payer: MEDICARE

## 2024-01-25 ENCOUNTER — OUTSIDE FACILITY SERVICE (OUTPATIENT)
Dept: PULMONOLOGY | Facility: CLINIC | Age: 49
End: 2024-01-25
Payer: MEDICARE

## 2024-01-25 LAB
A-A DO2: 58.9 MMHG (ref 0–300)
A-A DO2: 76.9 MMHG (ref 0–300)
ALBUMIN SERPL-MCNC: 3.2 G/DL (ref 3.5–5.2)
ALBUMIN/GLOB SERPL: 0.8 G/DL
ALP SERPL-CCNC: 101 U/L (ref 39–117)
ALT SERPL W P-5'-P-CCNC: 11 U/L (ref 1–41)
ANION GAP SERPL CALCULATED.3IONS-SCNC: 11.7 MMOL/L (ref 5–15)
ANISOCYTOSIS BLD QL: NORMAL
ARTERIAL PATENCY WRIST A: POSITIVE
ARTERIAL PATENCY WRIST A: POSITIVE
AST SERPL-CCNC: 10 U/L (ref 1–40)
ATMOSPHERIC PRESS: 729 MMHG
ATMOSPHERIC PRESS: 729 MMHG
BASE EXCESS BLDA CALC-SCNC: 1.5 MMOL/L (ref 0–2)
BASE EXCESS BLDA CALC-SCNC: 1.6 MMOL/L (ref 0–2)
BASOPHILS # BLD AUTO: 0.02 10*3/MM3 (ref 0–0.2)
BASOPHILS NFR BLD AUTO: 0.2 % (ref 0–1.5)
BDY SITE: ABNORMAL
BDY SITE: ABNORMAL
BILIRUB SERPL-MCNC: 0.3 MG/DL (ref 0–1.2)
BUN SERPL-MCNC: 43 MG/DL (ref 6–20)
BUN/CREAT SERPL: 58.9 (ref 7–25)
CALCIUM SPEC-SCNC: 8.8 MG/DL (ref 8.6–10.5)
CHLORIDE SERPL-SCNC: 103 MMOL/L (ref 98–107)
CO2 BLDA-SCNC: 28.7 MMOL/L (ref 22–33)
CO2 BLDA-SCNC: 29.3 MMOL/L (ref 22–33)
CO2 SERPL-SCNC: 26.3 MMOL/L (ref 22–29)
COHGB MFR BLD: 1.7 % (ref 0–5)
COHGB MFR BLD: 1.8 % (ref 0–5)
CREAT SERPL-MCNC: 0.73 MG/DL (ref 0.76–1.27)
CRP SERPL-MCNC: 3.99 MG/DL (ref 0–0.5)
DEPRECATED RDW RBC AUTO: 68 FL (ref 37–54)
EGFRCR SERPLBLD CKD-EPI 2021: 113.6 ML/MIN/1.73
EOSINOPHIL # BLD AUTO: 0.01 10*3/MM3 (ref 0–0.4)
EOSINOPHIL NFR BLD AUTO: 0.1 % (ref 0.3–6.2)
EPAP: 6
ERYTHROCYTE [DISTWIDTH] IN BLOOD BY AUTOMATED COUNT: 21.8 % (ref 12.3–15.4)
GAS FLOW AIRWAY: 2 LPM
GLOBULIN UR ELPH-MCNC: 3.9 GM/DL
GLUCOSE BLDC GLUCOMTR-MCNC: 100 MG/DL (ref 70–130)
GLUCOSE BLDC GLUCOMTR-MCNC: 107 MG/DL (ref 70–130)
GLUCOSE BLDC GLUCOMTR-MCNC: 109 MG/DL (ref 70–130)
GLUCOSE BLDC GLUCOMTR-MCNC: 121 MG/DL (ref 70–130)
GLUCOSE SERPL-MCNC: 132 MG/DL (ref 65–99)
HCO3 BLDA-SCNC: 27.2 MMOL/L (ref 20–26)
HCO3 BLDA-SCNC: 27.7 MMOL/L (ref 20–26)
HCT VFR BLD AUTO: 35.9 % (ref 37.5–51)
HCT VFR BLD CALC: 31 % (ref 38–51)
HCT VFR BLD CALC: 31.8 % (ref 38–51)
HGB BLD-MCNC: 9.7 G/DL (ref 13–17.7)
HGB BLDA-MCNC: 10.1 G/DL (ref 14–18)
HGB BLDA-MCNC: 10.4 G/DL (ref 14–18)
HYPOCHROMIA BLD QL: NORMAL
IMM GRANULOCYTES # BLD AUTO: 0.04 10*3/MM3 (ref 0–0.05)
IMM GRANULOCYTES NFR BLD AUTO: 0.4 % (ref 0–0.5)
INHALED O2 CONCENTRATION: 28 %
INHALED O2 CONCENTRATION: 30 %
IPAP: 20
LARGE PLATELETS: NORMAL
LYMPHOCYTES # BLD AUTO: 0.79 10*3/MM3 (ref 0.7–3.1)
LYMPHOCYTES NFR BLD AUTO: 7.7 % (ref 19.6–45.3)
Lab: ABNORMAL
Lab: ABNORMAL
MCH RBC QN AUTO: 23.8 PG (ref 26.6–33)
MCHC RBC AUTO-ENTMCNC: 27 G/DL (ref 31.5–35.7)
MCV RBC AUTO: 88.2 FL (ref 79–97)
METHGB BLD QL: 0.2 % (ref 0–3)
METHGB BLD QL: 0.4 % (ref 0–3)
MODALITY: ABNORMAL
MODALITY: ABNORMAL
MONOCYTES # BLD AUTO: 0.14 10*3/MM3 (ref 0.1–0.9)
MONOCYTES NFR BLD AUTO: 1.4 % (ref 5–12)
NEUTROPHILS NFR BLD AUTO: 9.22 10*3/MM3 (ref 1.7–7)
NEUTROPHILS NFR BLD AUTO: 90.2 % (ref 42.7–76)
NOTE: ABNORMAL
NOTIFIED BY: ABNORMAL
NOTIFIED WHO: ABNORMAL
NRBC BLD AUTO-RTO: 0 /100 WBC (ref 0–0.2)
OXYHGB MFR BLDV: 92.3 % (ref 94–99)
OXYHGB MFR BLDV: 93.2 % (ref 94–99)
PCO2 BLDA: 46.7 MM HG (ref 35–45)
PCO2 BLDA: 50.6 MM HG (ref 35–45)
PCO2 TEMP ADJ BLD: ABNORMAL MM[HG]
PCO2 TEMP ADJ BLD: ABNORMAL MM[HG]
PH BLDA: 7.35 PH UNITS (ref 7.35–7.45)
PH BLDA: 7.37 PH UNITS (ref 7.35–7.45)
PH, TEMP CORRECTED: ABNORMAL
PH, TEMP CORRECTED: ABNORMAL
PLATELET # BLD AUTO: 312 10*3/MM3 (ref 140–450)
PMV BLD AUTO: 10 FL (ref 6–12)
PO2 BLDA: 75.6 MM HG (ref 83–108)
PO2 BLDA: 75.6 MM HG (ref 83–108)
PO2 TEMP ADJ BLD: ABNORMAL MM[HG]
PO2 TEMP ADJ BLD: ABNORMAL MM[HG]
POTASSIUM SERPL-SCNC: 4.6 MMOL/L (ref 3.5–5.2)
PROT SERPL-MCNC: 7.1 G/DL (ref 6–8.5)
RBC # BLD AUTO: 4.07 10*6/MM3 (ref 4.14–5.8)
SAO2 % BLDCOA: 94.3 % (ref 94–99)
SAO2 % BLDCOA: 95.1 % (ref 94–99)
SODIUM SERPL-SCNC: 141 MMOL/L (ref 136–145)
VENTILATOR MODE: ABNORMAL
VENTILATOR MODE: ABNORMAL
WBC NRBC COR # BLD AUTO: 10.22 10*3/MM3 (ref 3.4–10.8)

## 2024-01-25 PROCEDURE — 82805 BLOOD GASES W/O2 SATURATION: CPT

## 2024-01-25 PROCEDURE — 70450 CT HEAD/BRAIN W/O DYE: CPT

## 2024-01-25 PROCEDURE — 71045 X-RAY EXAM CHEST 1 VIEW: CPT

## 2024-01-25 PROCEDURE — 85025 COMPLETE CBC W/AUTO DIFF WBC: CPT | Performed by: PHYSICIAN ASSISTANT

## 2024-01-25 PROCEDURE — 74176 CT ABD & PELVIS W/O CONTRAST: CPT

## 2024-01-25 PROCEDURE — 80053 COMPREHEN METABOLIC PANEL: CPT | Performed by: PHYSICIAN ASSISTANT

## 2024-01-25 PROCEDURE — 70450 CT HEAD/BRAIN W/O DYE: CPT | Performed by: RADIOLOGY

## 2024-01-25 PROCEDURE — 71250 CT THORAX DX C-: CPT

## 2024-01-25 PROCEDURE — 82375 ASSAY CARBOXYHB QUANT: CPT

## 2024-01-25 PROCEDURE — 85007 BL SMEAR W/DIFF WBC COUNT: CPT | Performed by: PHYSICIAN ASSISTANT

## 2024-01-25 PROCEDURE — 83050 HGB METHEMOGLOBIN QUAN: CPT

## 2024-01-25 PROCEDURE — 71250 CT THORAX DX C-: CPT | Performed by: RADIOLOGY

## 2024-01-25 PROCEDURE — 82948 REAGENT STRIP/BLOOD GLUCOSE: CPT

## 2024-01-25 PROCEDURE — 74176 CT ABD & PELVIS W/O CONTRAST: CPT | Performed by: RADIOLOGY

## 2024-01-25 PROCEDURE — 86140 C-REACTIVE PROTEIN: CPT | Performed by: PHYSICIAN ASSISTANT

## 2024-01-25 PROCEDURE — 71045 X-RAY EXAM CHEST 1 VIEW: CPT | Performed by: RADIOLOGY

## 2024-01-26 ENCOUNTER — OUTSIDE FACILITY SERVICE (OUTPATIENT)
Dept: INFECTIOUS DISEASES | Facility: CLINIC | Age: 49
End: 2024-01-26
Payer: MEDICARE

## 2024-01-26 ENCOUNTER — OUTSIDE FACILITY SERVICE (OUTPATIENT)
Dept: PULMONOLOGY | Facility: CLINIC | Age: 49
End: 2024-01-26
Payer: MEDICARE

## 2024-01-26 LAB
ALBUMIN SERPL-MCNC: 3 G/DL (ref 3.5–5.2)
ALBUMIN/GLOB SERPL: 0.8 G/DL
ALP SERPL-CCNC: 100 U/L (ref 39–117)
ALT SERPL W P-5'-P-CCNC: 5 U/L (ref 1–41)
ANION GAP SERPL CALCULATED.3IONS-SCNC: 10.8 MMOL/L (ref 5–15)
ANISOCYTOSIS BLD QL: NORMAL
AST SERPL-CCNC: 9 U/L (ref 1–40)
BASOPHILS # BLD AUTO: 0.01 10*3/MM3 (ref 0–0.2)
BASOPHILS NFR BLD AUTO: 0.1 % (ref 0–1.5)
BILIRUB SERPL-MCNC: 0.3 MG/DL (ref 0–1.2)
BUN SERPL-MCNC: 42 MG/DL (ref 6–20)
BUN/CREAT SERPL: 63.6 (ref 7–25)
CALCIUM SPEC-SCNC: 8.8 MG/DL (ref 8.6–10.5)
CHLORIDE SERPL-SCNC: 103 MMOL/L (ref 98–107)
CO2 SERPL-SCNC: 26.2 MMOL/L (ref 22–29)
CREAT SERPL-MCNC: 0.66 MG/DL (ref 0.76–1.27)
CRP SERPL-MCNC: 2.26 MG/DL (ref 0–0.5)
DEPRECATED RDW RBC AUTO: 67.1 FL (ref 37–54)
EGFRCR SERPLBLD CKD-EPI 2021: 117.1 ML/MIN/1.73
ELLIPTOCYTES BLD QL SMEAR: NORMAL
EOSINOPHIL # BLD AUTO: 0 10*3/MM3 (ref 0–0.4)
EOSINOPHIL NFR BLD AUTO: 0 % (ref 0.3–6.2)
ERYTHROCYTE [DISTWIDTH] IN BLOOD BY AUTOMATED COUNT: 22.4 % (ref 12.3–15.4)
GLOBULIN UR ELPH-MCNC: 3.9 GM/DL
GLUCOSE BLDC GLUCOMTR-MCNC: 106 MG/DL (ref 70–130)
GLUCOSE BLDC GLUCOMTR-MCNC: 114 MG/DL (ref 70–130)
GLUCOSE BLDC GLUCOMTR-MCNC: 128 MG/DL (ref 70–130)
GLUCOSE BLDC GLUCOMTR-MCNC: 161 MG/DL (ref 70–130)
GLUCOSE SERPL-MCNC: 128 MG/DL (ref 65–99)
HCT VFR BLD AUTO: 36.5 % (ref 37.5–51)
HGB BLD-MCNC: 10.1 G/DL (ref 13–17.7)
HYPOCHROMIA BLD QL: NORMAL
IMM GRANULOCYTES # BLD AUTO: 0.03 10*3/MM3 (ref 0–0.05)
IMM GRANULOCYTES NFR BLD AUTO: 0.4 % (ref 0–0.5)
LYMPHOCYTES # BLD AUTO: 0.69 10*3/MM3 (ref 0.7–3.1)
LYMPHOCYTES NFR BLD AUTO: 8.5 % (ref 19.6–45.3)
MCH RBC QN AUTO: 24 PG (ref 26.6–33)
MCHC RBC AUTO-ENTMCNC: 27.7 G/DL (ref 31.5–35.7)
MCV RBC AUTO: 86.7 FL (ref 79–97)
MONOCYTES # BLD AUTO: 0.12 10*3/MM3 (ref 0.1–0.9)
MONOCYTES NFR BLD AUTO: 1.5 % (ref 5–12)
NEUTROPHILS NFR BLD AUTO: 7.24 10*3/MM3 (ref 1.7–7)
NEUTROPHILS NFR BLD AUTO: 89.5 % (ref 42.7–76)
NRBC BLD AUTO-RTO: 0 /100 WBC (ref 0–0.2)
PLAT MORPH BLD: NORMAL
PLATELET # BLD AUTO: 361 10*3/MM3 (ref 140–450)
PMV BLD AUTO: 10.3 FL (ref 6–12)
POTASSIUM SERPL-SCNC: 4.4 MMOL/L (ref 3.5–5.2)
PROT SERPL-MCNC: 6.9 G/DL (ref 6–8.5)
RBC # BLD AUTO: 4.21 10*6/MM3 (ref 4.14–5.8)
SODIUM SERPL-SCNC: 140 MMOL/L (ref 136–145)
WBC NRBC COR # BLD AUTO: 8.09 10*3/MM3 (ref 3.4–10.8)

## 2024-01-26 PROCEDURE — 36415 COLL VENOUS BLD VENIPUNCTURE: CPT | Performed by: PHYSICIAN ASSISTANT

## 2024-01-26 PROCEDURE — 85007 BL SMEAR W/DIFF WBC COUNT: CPT | Performed by: PHYSICIAN ASSISTANT

## 2024-01-26 PROCEDURE — 86140 C-REACTIVE PROTEIN: CPT | Performed by: PHYSICIAN ASSISTANT

## 2024-01-26 PROCEDURE — 85025 COMPLETE CBC W/AUTO DIFF WBC: CPT | Performed by: PHYSICIAN ASSISTANT

## 2024-01-26 PROCEDURE — 82948 REAGENT STRIP/BLOOD GLUCOSE: CPT

## 2024-01-26 PROCEDURE — 80053 COMPREHEN METABOLIC PANEL: CPT | Performed by: PHYSICIAN ASSISTANT

## 2024-01-27 ENCOUNTER — OUTSIDE FACILITY SERVICE (OUTPATIENT)
Dept: PULMONOLOGY | Facility: CLINIC | Age: 49
End: 2024-01-27
Payer: MEDICARE

## 2024-01-27 LAB
ALBUMIN SERPL-MCNC: 3.2 G/DL (ref 3.5–5.2)
ALBUMIN/GLOB SERPL: 0.8 G/DL
ALP SERPL-CCNC: 98 U/L (ref 39–117)
ALT SERPL W P-5'-P-CCNC: 9 U/L (ref 1–41)
ANION GAP SERPL CALCULATED.3IONS-SCNC: 9.3 MMOL/L (ref 5–15)
ANISOCYTOSIS BLD QL: NORMAL
AST SERPL-CCNC: 10 U/L (ref 1–40)
ATMOSPHERIC PRESS: 730 MMHG
BASE EXCESS BLDV CALC-SCNC: 3.5 MMOL/L (ref 0–2)
BASOPHILS # BLD AUTO: 0.01 10*3/MM3 (ref 0–0.2)
BASOPHILS NFR BLD AUTO: 0.1 % (ref 0–1.5)
BDY SITE: ABNORMAL
BILIRUB SERPL-MCNC: 0.2 MG/DL (ref 0–1.2)
BUN SERPL-MCNC: 52 MG/DL (ref 6–20)
BUN/CREAT SERPL: 73.2 (ref 7–25)
CALCIUM SPEC-SCNC: 8.8 MG/DL (ref 8.6–10.5)
CHLORIDE SERPL-SCNC: 103 MMOL/L (ref 98–107)
CO2 BLDA-SCNC: 31.5 MMOL/L (ref 22–33)
CO2 SERPL-SCNC: 26.7 MMOL/L (ref 22–29)
COHGB MFR BLD: 1.6 % (ref 0–5)
CREAT SERPL-MCNC: 0.71 MG/DL (ref 0.76–1.27)
CRP SERPL-MCNC: 1.63 MG/DL (ref 0–0.5)
DEPRECATED RDW RBC AUTO: 71.5 FL (ref 37–54)
EGFRCR SERPLBLD CKD-EPI 2021: 114.6 ML/MIN/1.73
ELLIPTOCYTES BLD QL SMEAR: NORMAL
EOSINOPHIL # BLD AUTO: 0.01 10*3/MM3 (ref 0–0.4)
EOSINOPHIL NFR BLD AUTO: 0.1 % (ref 0.3–6.2)
EPAP: 6
ERYTHROCYTE [DISTWIDTH] IN BLOOD BY AUTOMATED COUNT: 22.7 % (ref 12.3–15.4)
GLOBULIN UR ELPH-MCNC: 4 GM/DL
GLUCOSE BLDC GLUCOMTR-MCNC: 108 MG/DL (ref 70–130)
GLUCOSE BLDC GLUCOMTR-MCNC: 109 MG/DL (ref 70–130)
GLUCOSE BLDC GLUCOMTR-MCNC: 117 MG/DL (ref 70–130)
GLUCOSE BLDC GLUCOMTR-MCNC: 118 MG/DL (ref 70–130)
GLUCOSE SERPL-MCNC: 142 MG/DL (ref 65–99)
HCO3 BLDV-SCNC: 29.8 MMOL/L (ref 22–28)
HCT VFR BLD AUTO: 38.1 % (ref 37.5–51)
HGB BLD-MCNC: 10.3 G/DL (ref 13–17.7)
HGB BLDA-MCNC: 10.5 G/DL (ref 14–18)
HYPOCHROMIA BLD QL: NORMAL
IMM GRANULOCYTES # BLD AUTO: 0.04 10*3/MM3 (ref 0–0.05)
IMM GRANULOCYTES NFR BLD AUTO: 0.6 % (ref 0–0.5)
INHALED O2 CONCENTRATION: 30 %
IPAP: 20
LYMPHOCYTES # BLD AUTO: 0.73 10*3/MM3 (ref 0.7–3.1)
LYMPHOCYTES NFR BLD AUTO: 10.1 % (ref 19.6–45.3)
Lab: ABNORMAL
MCH RBC QN AUTO: 23.8 PG (ref 26.6–33)
MCHC RBC AUTO-ENTMCNC: 27 G/DL (ref 31.5–35.7)
MCV RBC AUTO: 88.2 FL (ref 79–97)
METHGB BLD QL: 0.3 % (ref 0–3)
MODALITY: ABNORMAL
MONOCYTES # BLD AUTO: 0.16 10*3/MM3 (ref 0.1–0.9)
MONOCYTES NFR BLD AUTO: 2.2 % (ref 5–12)
NEUTROPHILS NFR BLD AUTO: 6.3 10*3/MM3 (ref 1.7–7)
NEUTROPHILS NFR BLD AUTO: 86.9 % (ref 42.7–76)
NOTIFIED BY: ABNORMAL
NOTIFIED WHO: ABNORMAL
NRBC BLD AUTO-RTO: 0 /100 WBC (ref 0–0.2)
OXYHGB MFR BLDV: 69.5 % (ref 45–75)
PCO2 BLDV: 52.9 MM HG (ref 41–51)
PH BLDV: 7.36 PH UNITS (ref 7.32–7.42)
PLAT MORPH BLD: NORMAL
PLATELET # BLD AUTO: 312 10*3/MM3 (ref 140–450)
PMV BLD AUTO: 10.4 FL (ref 6–12)
PO2 BLDV: 40.5 MM HG (ref 27–53)
POTASSIUM SERPL-SCNC: 4.7 MMOL/L (ref 3.5–5.2)
PROT SERPL-MCNC: 7.2 G/DL (ref 6–8.5)
RBC # BLD AUTO: 4.32 10*6/MM3 (ref 4.14–5.8)
SAO2 % BLDCOV: 70.8 % (ref 45–75)
SET MECH RESP RATE: 14
SODIUM SERPL-SCNC: 139 MMOL/L (ref 136–145)
TOTAL RATE: 15 BREATHS/MINUTE
VENTILATOR MODE: ABNORMAL
WBC NRBC COR # BLD AUTO: 7.25 10*3/MM3 (ref 3.4–10.8)

## 2024-01-27 PROCEDURE — 82948 REAGENT STRIP/BLOOD GLUCOSE: CPT

## 2024-01-27 PROCEDURE — 86140 C-REACTIVE PROTEIN: CPT | Performed by: PHYSICIAN ASSISTANT

## 2024-01-27 PROCEDURE — 80053 COMPREHEN METABOLIC PANEL: CPT | Performed by: PHYSICIAN ASSISTANT

## 2024-01-27 PROCEDURE — 85025 COMPLETE CBC W/AUTO DIFF WBC: CPT | Performed by: PHYSICIAN ASSISTANT

## 2024-01-27 PROCEDURE — 82805 BLOOD GASES W/O2 SATURATION: CPT

## 2024-01-27 PROCEDURE — 36415 COLL VENOUS BLD VENIPUNCTURE: CPT | Performed by: PHYSICIAN ASSISTANT

## 2024-01-27 PROCEDURE — 82820 HEMOGLOBIN-OXYGEN AFFINITY: CPT

## 2024-01-27 PROCEDURE — 85007 BL SMEAR W/DIFF WBC COUNT: CPT | Performed by: PHYSICIAN ASSISTANT

## 2024-01-28 ENCOUNTER — OUTSIDE FACILITY SERVICE (OUTPATIENT)
Dept: PULMONOLOGY | Facility: CLINIC | Age: 49
End: 2024-01-28
Payer: MEDICARE

## 2024-01-28 LAB
ALBUMIN SERPL-MCNC: 3.1 G/DL (ref 3.5–5.2)
ALBUMIN/GLOB SERPL: 0.8 G/DL
ALP SERPL-CCNC: 91 U/L (ref 39–117)
ALT SERPL W P-5'-P-CCNC: 8 U/L (ref 1–41)
ANION GAP SERPL CALCULATED.3IONS-SCNC: 8.4 MMOL/L (ref 5–15)
ANISOCYTOSIS BLD QL: NORMAL
AST SERPL-CCNC: 10 U/L (ref 1–40)
BASOPHILS # BLD AUTO: 0.04 10*3/MM3 (ref 0–0.2)
BASOPHILS NFR BLD AUTO: 0.4 % (ref 0–1.5)
BILIRUB SERPL-MCNC: 0.3 MG/DL (ref 0–1.2)
BUN SERPL-MCNC: 56 MG/DL (ref 6–20)
BUN/CREAT SERPL: 78.9 (ref 7–25)
CALCIUM SPEC-SCNC: 8.6 MG/DL (ref 8.6–10.5)
CHLORIDE SERPL-SCNC: 105 MMOL/L (ref 98–107)
CO2 SERPL-SCNC: 26.6 MMOL/L (ref 22–29)
CREAT SERPL-MCNC: 0.71 MG/DL (ref 0.76–1.27)
CRP SERPL-MCNC: 1.21 MG/DL (ref 0–0.5)
DACRYOCYTES BLD QL SMEAR: NORMAL
DEPRECATED RDW RBC AUTO: 70.1 FL (ref 37–54)
EGFRCR SERPLBLD CKD-EPI 2021: 114.6 ML/MIN/1.73
ELLIPTOCYTES BLD QL SMEAR: NORMAL
EOSINOPHIL # BLD AUTO: 0.12 10*3/MM3 (ref 0–0.4)
EOSINOPHIL NFR BLD AUTO: 1.2 % (ref 0.3–6.2)
ERYTHROCYTE [DISTWIDTH] IN BLOOD BY AUTOMATED COUNT: 22.6 % (ref 12.3–15.4)
GLOBULIN UR ELPH-MCNC: 3.7 GM/DL
GLUCOSE BLDC GLUCOMTR-MCNC: 108 MG/DL (ref 70–130)
GLUCOSE BLDC GLUCOMTR-MCNC: 151 MG/DL (ref 70–130)
GLUCOSE BLDC GLUCOMTR-MCNC: 83 MG/DL (ref 70–130)
GLUCOSE BLDC GLUCOMTR-MCNC: 94 MG/DL (ref 70–130)
GLUCOSE SERPL-MCNC: 99 MG/DL (ref 65–99)
HCT VFR BLD AUTO: 38 % (ref 37.5–51)
HGB BLD-MCNC: 10.3 G/DL (ref 13–17.7)
HYPOCHROMIA BLD QL: NORMAL
IMM GRANULOCYTES # BLD AUTO: 0.03 10*3/MM3 (ref 0–0.05)
IMM GRANULOCYTES NFR BLD AUTO: 0.3 % (ref 0–0.5)
LYMPHOCYTES # BLD AUTO: 2.61 10*3/MM3 (ref 0.7–3.1)
LYMPHOCYTES NFR BLD AUTO: 25.1 % (ref 19.6–45.3)
MCH RBC QN AUTO: 23.6 PG (ref 26.6–33)
MCHC RBC AUTO-ENTMCNC: 27.1 G/DL (ref 31.5–35.7)
MCV RBC AUTO: 87.2 FL (ref 79–97)
MONOCYTES # BLD AUTO: 0.74 10*3/MM3 (ref 0.1–0.9)
MONOCYTES NFR BLD AUTO: 7.1 % (ref 5–12)
NEUTROPHILS NFR BLD AUTO: 6.85 10*3/MM3 (ref 1.7–7)
NEUTROPHILS NFR BLD AUTO: 65.9 % (ref 42.7–76)
NRBC BLD AUTO-RTO: 0 /100 WBC (ref 0–0.2)
PLAT MORPH BLD: NORMAL
PLATELET # BLD AUTO: 294 10*3/MM3 (ref 140–450)
PMV BLD AUTO: 10.5 FL (ref 6–12)
POTASSIUM SERPL-SCNC: 4 MMOL/L (ref 3.5–5.2)
PROT SERPL-MCNC: 6.8 G/DL (ref 6–8.5)
RBC # BLD AUTO: 4.36 10*6/MM3 (ref 4.14–5.8)
SODIUM SERPL-SCNC: 140 MMOL/L (ref 136–145)
WBC NRBC COR # BLD AUTO: 10.39 10*3/MM3 (ref 3.4–10.8)

## 2024-01-28 PROCEDURE — 82948 REAGENT STRIP/BLOOD GLUCOSE: CPT

## 2024-01-28 PROCEDURE — 85007 BL SMEAR W/DIFF WBC COUNT: CPT | Performed by: PHYSICIAN ASSISTANT

## 2024-01-28 PROCEDURE — 80053 COMPREHEN METABOLIC PANEL: CPT | Performed by: PHYSICIAN ASSISTANT

## 2024-01-28 PROCEDURE — 36415 COLL VENOUS BLD VENIPUNCTURE: CPT | Performed by: PHYSICIAN ASSISTANT

## 2024-01-28 PROCEDURE — 86140 C-REACTIVE PROTEIN: CPT | Performed by: PHYSICIAN ASSISTANT

## 2024-01-28 PROCEDURE — 85025 COMPLETE CBC W/AUTO DIFF WBC: CPT | Performed by: PHYSICIAN ASSISTANT

## 2024-01-29 ENCOUNTER — OUTSIDE FACILITY SERVICE (OUTPATIENT)
Dept: PULMONOLOGY | Facility: CLINIC | Age: 49
End: 2024-01-29
Payer: MEDICARE

## 2024-01-29 ENCOUNTER — APPOINTMENT (OUTPATIENT)
Dept: GENERAL RADIOLOGY | Facility: HOSPITAL | Age: 49
End: 2024-01-29
Payer: MEDICARE

## 2024-01-29 ENCOUNTER — OUTSIDE FACILITY SERVICE (OUTPATIENT)
Dept: INFECTIOUS DISEASES | Facility: CLINIC | Age: 49
End: 2024-01-29
Payer: MEDICARE

## 2024-01-29 LAB
ALBUMIN SERPL-MCNC: 2.9 G/DL (ref 3.5–5.2)
ALBUMIN/GLOB SERPL: 0.8 G/DL
ALP SERPL-CCNC: 94 U/L (ref 39–117)
ALT SERPL W P-5'-P-CCNC: 8 U/L (ref 1–41)
ANION GAP SERPL CALCULATED.3IONS-SCNC: 6.7 MMOL/L (ref 5–15)
ANISOCYTOSIS BLD QL: NORMAL
AST SERPL-CCNC: 9 U/L (ref 1–40)
BASOPHILS # BLD AUTO: 0.04 10*3/MM3 (ref 0–0.2)
BASOPHILS NFR BLD AUTO: 0.4 % (ref 0–1.5)
BILIRUB SERPL-MCNC: 0.4 MG/DL (ref 0–1.2)
BUN SERPL-MCNC: 46 MG/DL (ref 6–20)
BUN/CREAT SERPL: 63 (ref 7–25)
CALCIUM SPEC-SCNC: 8.4 MG/DL (ref 8.6–10.5)
CHLORIDE SERPL-SCNC: 106 MMOL/L (ref 98–107)
CO2 SERPL-SCNC: 27.3 MMOL/L (ref 22–29)
CREAT SERPL-MCNC: 0.73 MG/DL (ref 0.76–1.27)
CRP SERPL-MCNC: 3.8 MG/DL (ref 0–0.5)
DACRYOCYTES BLD QL SMEAR: NORMAL
DEPRECATED RDW RBC AUTO: 67.7 FL (ref 37–54)
EGFRCR SERPLBLD CKD-EPI 2021: 113.6 ML/MIN/1.73
ELLIPTOCYTES BLD QL SMEAR: NORMAL
EOSINOPHIL # BLD AUTO: 0.18 10*3/MM3 (ref 0–0.4)
EOSINOPHIL NFR BLD AUTO: 1.8 % (ref 0.3–6.2)
ERYTHROCYTE [DISTWIDTH] IN BLOOD BY AUTOMATED COUNT: 22.2 % (ref 12.3–15.4)
GLOBULIN UR ELPH-MCNC: 3.5 GM/DL
GLUCOSE BLDC GLUCOMTR-MCNC: 80 MG/DL (ref 70–130)
GLUCOSE BLDC GLUCOMTR-MCNC: 88 MG/DL (ref 70–130)
GLUCOSE BLDC GLUCOMTR-MCNC: 97 MG/DL (ref 70–130)
GLUCOSE BLDC GLUCOMTR-MCNC: 98 MG/DL (ref 70–130)
GLUCOSE SERPL-MCNC: 90 MG/DL (ref 65–99)
HCT VFR BLD AUTO: 35.8 % (ref 37.5–51)
HGB BLD-MCNC: 10 G/DL (ref 13–17.7)
HYPOCHROMIA BLD QL: NORMAL
IMM GRANULOCYTES # BLD AUTO: 0.03 10*3/MM3 (ref 0–0.05)
IMM GRANULOCYTES NFR BLD AUTO: 0.3 % (ref 0–0.5)
LARGE PLATELETS: NORMAL
LYMPHOCYTES # BLD AUTO: 1.81 10*3/MM3 (ref 0.7–3.1)
LYMPHOCYTES NFR BLD AUTO: 18 % (ref 19.6–45.3)
MCH RBC QN AUTO: 23.8 PG (ref 26.6–33)
MCHC RBC AUTO-ENTMCNC: 27.9 G/DL (ref 31.5–35.7)
MCV RBC AUTO: 85 FL (ref 79–97)
MONOCYTES # BLD AUTO: 0.68 10*3/MM3 (ref 0.1–0.9)
MONOCYTES NFR BLD AUTO: 6.8 % (ref 5–12)
NEUTROPHILS NFR BLD AUTO: 7.3 10*3/MM3 (ref 1.7–7)
NEUTROPHILS NFR BLD AUTO: 72.7 % (ref 42.7–76)
NRBC BLD AUTO-RTO: 0 /100 WBC (ref 0–0.2)
PLATELET # BLD AUTO: 254 10*3/MM3 (ref 140–450)
PMV BLD AUTO: 10 FL (ref 6–12)
POTASSIUM SERPL-SCNC: 4.3 MMOL/L (ref 3.5–5.2)
PROT SERPL-MCNC: 6.4 G/DL (ref 6–8.5)
RBC # BLD AUTO: 4.21 10*6/MM3 (ref 4.14–5.8)
SODIUM SERPL-SCNC: 140 MMOL/L (ref 136–145)
WBC NRBC COR # BLD AUTO: 10.04 10*3/MM3 (ref 3.4–10.8)

## 2024-01-29 PROCEDURE — 82948 REAGENT STRIP/BLOOD GLUCOSE: CPT

## 2024-01-29 PROCEDURE — 86140 C-REACTIVE PROTEIN: CPT | Performed by: PHYSICIAN ASSISTANT

## 2024-01-29 PROCEDURE — 71045 X-RAY EXAM CHEST 1 VIEW: CPT

## 2024-01-29 PROCEDURE — 85025 COMPLETE CBC W/AUTO DIFF WBC: CPT | Performed by: PHYSICIAN ASSISTANT

## 2024-01-29 PROCEDURE — 85007 BL SMEAR W/DIFF WBC COUNT: CPT | Performed by: PHYSICIAN ASSISTANT

## 2024-01-29 PROCEDURE — 71045 X-RAY EXAM CHEST 1 VIEW: CPT | Performed by: RADIOLOGY

## 2024-01-29 PROCEDURE — 36415 COLL VENOUS BLD VENIPUNCTURE: CPT | Performed by: PHYSICIAN ASSISTANT

## 2024-01-29 PROCEDURE — 80053 COMPREHEN METABOLIC PANEL: CPT | Performed by: PHYSICIAN ASSISTANT

## 2024-01-30 ENCOUNTER — OUTSIDE FACILITY SERVICE (OUTPATIENT)
Dept: PULMONOLOGY | Facility: CLINIC | Age: 49
End: 2024-01-30
Payer: MEDICARE

## 2024-01-30 ENCOUNTER — OUTSIDE FACILITY SERVICE (OUTPATIENT)
Dept: INFECTIOUS DISEASES | Facility: CLINIC | Age: 49
End: 2024-01-30
Payer: MEDICARE

## 2024-01-30 LAB
ALBUMIN SERPL-MCNC: 3 G/DL (ref 3.5–5.2)
ALBUMIN/GLOB SERPL: 1 G/DL
ALP SERPL-CCNC: 101 U/L (ref 39–117)
ALT SERPL W P-5'-P-CCNC: 6 U/L (ref 1–41)
ANION GAP SERPL CALCULATED.3IONS-SCNC: 7.2 MMOL/L (ref 5–15)
ANISOCYTOSIS BLD QL: NORMAL
AST SERPL-CCNC: 8 U/L (ref 1–40)
ATMOSPHERIC PRESS: 728 MMHG
ATMOSPHERIC PRESS: 728 MMHG
BACTERIA UR QL AUTO: ABNORMAL /HPF
BASE EXCESS BLDV CALC-SCNC: 3.2 MMOL/L (ref 0–2)
BASE EXCESS BLDV CALC-SCNC: 3.4 MMOL/L (ref 0–2)
BASOPHILS # BLD AUTO: 0.03 10*3/MM3 (ref 0–0.2)
BASOPHILS NFR BLD AUTO: 0.3 % (ref 0–1.5)
BDY SITE: ABNORMAL
BDY SITE: ABNORMAL
BILIRUB SERPL-MCNC: 0.5 MG/DL (ref 0–1.2)
BILIRUB UR QL STRIP: NEGATIVE
BUN SERPL-MCNC: 36 MG/DL (ref 6–20)
BUN/CREAT SERPL: 57.1 (ref 7–25)
CALCIUM SPEC-SCNC: 8.5 MG/DL (ref 8.6–10.5)
CHLORIDE SERPL-SCNC: 104 MMOL/L (ref 98–107)
CLARITY UR: CLEAR
CO2 BLDA-SCNC: 30.6 MMOL/L (ref 22–33)
CO2 BLDA-SCNC: 30.8 MMOL/L (ref 22–33)
CO2 SERPL-SCNC: 24.8 MMOL/L (ref 22–29)
COHGB MFR BLD: 2.2 % (ref 0–5)
COHGB MFR BLD: 2.2 % (ref 0–5)
COLOR UR: YELLOW
CREAT SERPL-MCNC: 0.63 MG/DL (ref 0.76–1.27)
CRP SERPL-MCNC: 7.29 MG/DL (ref 0–0.5)
DACRYOCYTES BLD QL SMEAR: NORMAL
DEPRECATED RDW RBC AUTO: 68.9 FL (ref 37–54)
EGFRCR SERPLBLD CKD-EPI 2021: 118.8 ML/MIN/1.73
ELLIPTOCYTES BLD QL SMEAR: NORMAL
EOSINOPHIL # BLD AUTO: 0.23 10*3/MM3 (ref 0–0.4)
EOSINOPHIL NFR BLD AUTO: 2.2 % (ref 0.3–6.2)
ERYTHROCYTE [DISTWIDTH] IN BLOOD BY AUTOMATED COUNT: 22.5 % (ref 12.3–15.4)
GAS FLOW AIRWAY: 2 LPM
GAS FLOW AIRWAY: 2 LPM
GLOBULIN UR ELPH-MCNC: 3.1 GM/DL
GLUCOSE BLDC GLUCOMTR-MCNC: 102 MG/DL (ref 70–130)
GLUCOSE BLDC GLUCOMTR-MCNC: 109 MG/DL (ref 70–130)
GLUCOSE BLDC GLUCOMTR-MCNC: 118 MG/DL (ref 70–130)
GLUCOSE BLDC GLUCOMTR-MCNC: 98 MG/DL (ref 70–130)
GLUCOSE SERPL-MCNC: 126 MG/DL (ref 65–99)
GLUCOSE UR STRIP-MCNC: NEGATIVE MG/DL
HCO3 BLDV-SCNC: 29.1 MMOL/L (ref 22–28)
HCO3 BLDV-SCNC: 29.3 MMOL/L (ref 22–28)
HCT VFR BLD AUTO: 36.3 % (ref 37.5–51)
HGB BLD-MCNC: 10.4 G/DL (ref 13–17.7)
HGB BLDA-MCNC: 10.5 G/DL (ref 14–18)
HGB BLDA-MCNC: 10.5 G/DL (ref 14–18)
HGB UR QL STRIP.AUTO: ABNORMAL
HYALINE CASTS UR QL AUTO: ABNORMAL /LPF
HYPOCHROMIA BLD QL: NORMAL
IMM GRANULOCYTES # BLD AUTO: 0.04 10*3/MM3 (ref 0–0.05)
IMM GRANULOCYTES NFR BLD AUTO: 0.4 % (ref 0–0.5)
KETONES UR QL STRIP: NEGATIVE
LEUKOCYTE ESTERASE UR QL STRIP.AUTO: ABNORMAL
LYMPHOCYTES # BLD AUTO: 1.54 10*3/MM3 (ref 0.7–3.1)
LYMPHOCYTES NFR BLD AUTO: 15 % (ref 19.6–45.3)
Lab: ABNORMAL
Lab: ABNORMAL
MCH RBC QN AUTO: 24.5 PG (ref 26.6–33)
MCHC RBC AUTO-ENTMCNC: 28.7 G/DL (ref 31.5–35.7)
MCV RBC AUTO: 85.4 FL (ref 79–97)
METHGB BLD QL: 0 % (ref 0–3)
METHGB BLD QL: 0.3 % (ref 0–3)
MODALITY: ABNORMAL
MODALITY: ABNORMAL
MONOCYTES # BLD AUTO: 0.58 10*3/MM3 (ref 0.1–0.9)
MONOCYTES NFR BLD AUTO: 5.7 % (ref 5–12)
NEUTROPHILS NFR BLD AUTO: 7.83 10*3/MM3 (ref 1.7–7)
NEUTROPHILS NFR BLD AUTO: 76.4 % (ref 42.7–76)
NITRITE UR QL STRIP: NEGATIVE
NOTIFIED BY: ABNORMAL
NOTIFIED BY: ABNORMAL
NOTIFIED WHO: ABNORMAL
NOTIFIED WHO: ABNORMAL
NRBC BLD AUTO-RTO: 0 /100 WBC (ref 0–0.2)
OXYHGB MFR BLDV: 92.3 % (ref 45–75)
OXYHGB MFR BLDV: 92.6 % (ref 45–75)
PCO2 BLDV: 49.4 MM HG (ref 41–51)
PCO2 BLDV: 49.8 MM HG (ref 41–51)
PH BLDV: 7.38 PH UNITS (ref 7.32–7.42)
PH BLDV: 7.38 PH UNITS (ref 7.32–7.42)
PH UR STRIP.AUTO: 6.5 [PH] (ref 5–8)
PLAT MORPH BLD: NORMAL
PLATELET # BLD AUTO: 213 10*3/MM3 (ref 140–450)
PMV BLD AUTO: 10.7 FL (ref 6–12)
PO2 BLDV: 71 MM HG (ref 27–53)
PO2 BLDV: 71.4 MM HG (ref 27–53)
POTASSIUM SERPL-SCNC: 4.3 MMOL/L (ref 3.5–5.2)
PROT SERPL-MCNC: 6.1 G/DL (ref 6–8.5)
PROT UR QL STRIP: ABNORMAL
RBC # BLD AUTO: 4.25 10*6/MM3 (ref 4.14–5.8)
RBC # UR STRIP: ABNORMAL /HPF
REF LAB TEST METHOD: ABNORMAL
SAO2 % BLDCOV: 94.7 % (ref 45–75)
SAO2 % BLDCOV: 94.7 % (ref 45–75)
SODIUM SERPL-SCNC: 136 MMOL/L (ref 136–145)
SP GR UR STRIP: 1.01 (ref 1–1.03)
SQUAMOUS #/AREA URNS HPF: ABNORMAL /HPF
UROBILINOGEN UR QL STRIP: ABNORMAL
VENTILATOR MODE: ABNORMAL
VENTILATOR MODE: ABNORMAL
WBC # UR STRIP: ABNORMAL /HPF
WBC NRBC COR # BLD AUTO: 10.25 10*3/MM3 (ref 3.4–10.8)
YEAST URNS QL MICRO: ABNORMAL /HPF

## 2024-01-30 PROCEDURE — 82820 HEMOGLOBIN-OXYGEN AFFINITY: CPT

## 2024-01-30 PROCEDURE — 82948 REAGENT STRIP/BLOOD GLUCOSE: CPT

## 2024-01-30 PROCEDURE — 36415 COLL VENOUS BLD VENIPUNCTURE: CPT | Performed by: PHYSICIAN ASSISTANT

## 2024-01-30 PROCEDURE — 80053 COMPREHEN METABOLIC PANEL: CPT | Performed by: PHYSICIAN ASSISTANT

## 2024-01-30 PROCEDURE — 86140 C-REACTIVE PROTEIN: CPT | Performed by: PHYSICIAN ASSISTANT

## 2024-01-30 PROCEDURE — 85007 BL SMEAR W/DIFF WBC COUNT: CPT | Performed by: PHYSICIAN ASSISTANT

## 2024-01-30 PROCEDURE — 87040 BLOOD CULTURE FOR BACTERIA: CPT

## 2024-01-30 PROCEDURE — 81001 URINALYSIS AUTO W/SCOPE: CPT

## 2024-01-30 PROCEDURE — 87086 URINE CULTURE/COLONY COUNT: CPT

## 2024-01-30 PROCEDURE — 82805 BLOOD GASES W/O2 SATURATION: CPT

## 2024-01-30 PROCEDURE — 36415 COLL VENOUS BLD VENIPUNCTURE: CPT

## 2024-01-30 PROCEDURE — 85025 COMPLETE CBC W/AUTO DIFF WBC: CPT | Performed by: PHYSICIAN ASSISTANT

## 2024-01-31 ENCOUNTER — OUTSIDE FACILITY SERVICE (OUTPATIENT)
Dept: PULMONOLOGY | Facility: CLINIC | Age: 49
End: 2024-01-31
Payer: MEDICARE

## 2024-01-31 ENCOUNTER — OUTSIDE FACILITY SERVICE (OUTPATIENT)
Dept: INFECTIOUS DISEASES | Facility: CLINIC | Age: 49
End: 2024-01-31
Payer: MEDICARE

## 2024-01-31 LAB
ALBUMIN SERPL-MCNC: 3.1 G/DL (ref 3.5–5.2)
ALBUMIN/GLOB SERPL: 1.1 G/DL
ALP SERPL-CCNC: 116 U/L (ref 39–117)
ALT SERPL W P-5'-P-CCNC: 11 U/L (ref 1–41)
ANION GAP SERPL CALCULATED.3IONS-SCNC: 10.6 MMOL/L (ref 5–15)
ANISOCYTOSIS BLD QL: NORMAL
AST SERPL-CCNC: 19 U/L (ref 1–40)
BACTERIA SPEC AEROBE CULT: ABNORMAL
BASOPHILS # BLD AUTO: 0.02 10*3/MM3 (ref 0–0.2)
BASOPHILS NFR BLD AUTO: 0.2 % (ref 0–1.5)
BILIRUB SERPL-MCNC: 0.7 MG/DL (ref 0–1.2)
BUN SERPL-MCNC: 34 MG/DL (ref 6–20)
BUN/CREAT SERPL: 65.4 (ref 7–25)
CALCIUM SPEC-SCNC: 8.7 MG/DL (ref 8.6–10.5)
CHLORIDE SERPL-SCNC: 104 MMOL/L (ref 98–107)
CO2 SERPL-SCNC: 24.4 MMOL/L (ref 22–29)
CREAT SERPL-MCNC: 0.52 MG/DL (ref 0.76–1.27)
CRP SERPL-MCNC: 7.41 MG/DL (ref 0–0.5)
DEPRECATED RDW RBC AUTO: 68.5 FL (ref 37–54)
EGFRCR SERPLBLD CKD-EPI 2021: 125.9 ML/MIN/1.73
ELLIPTOCYTES BLD QL SMEAR: NORMAL
EOSINOPHIL # BLD AUTO: 0.2 10*3/MM3 (ref 0–0.4)
EOSINOPHIL NFR BLD AUTO: 2.2 % (ref 0.3–6.2)
ERYTHROCYTE [DISTWIDTH] IN BLOOD BY AUTOMATED COUNT: 22.4 % (ref 12.3–15.4)
GLOBULIN UR ELPH-MCNC: 2.9 GM/DL
GLUCOSE BLDC GLUCOMTR-MCNC: 100 MG/DL (ref 70–130)
GLUCOSE BLDC GLUCOMTR-MCNC: 136 MG/DL (ref 70–130)
GLUCOSE BLDC GLUCOMTR-MCNC: 171 MG/DL (ref 70–130)
GLUCOSE BLDC GLUCOMTR-MCNC: 81 MG/DL (ref 70–130)
GLUCOSE SERPL-MCNC: 91 MG/DL (ref 65–99)
HCT VFR BLD AUTO: 37.2 % (ref 37.5–51)
HGB BLD-MCNC: 10.5 G/DL (ref 13–17.7)
HYPOCHROMIA BLD QL: NORMAL
IMM GRANULOCYTES # BLD AUTO: 0.05 10*3/MM3 (ref 0–0.05)
IMM GRANULOCYTES NFR BLD AUTO: 0.5 % (ref 0–0.5)
LYMPHOCYTES # BLD AUTO: 1.31 10*3/MM3 (ref 0.7–3.1)
LYMPHOCYTES NFR BLD AUTO: 14.1 % (ref 19.6–45.3)
MCH RBC QN AUTO: 24.1 PG (ref 26.6–33)
MCHC RBC AUTO-ENTMCNC: 28.2 G/DL (ref 31.5–35.7)
MCV RBC AUTO: 85.5 FL (ref 79–97)
MONOCYTES # BLD AUTO: 0.47 10*3/MM3 (ref 0.1–0.9)
MONOCYTES NFR BLD AUTO: 5.1 % (ref 5–12)
NEUTROPHILS NFR BLD AUTO: 7.22 10*3/MM3 (ref 1.7–7)
NEUTROPHILS NFR BLD AUTO: 77.9 % (ref 42.7–76)
NRBC BLD AUTO-RTO: 0 /100 WBC (ref 0–0.2)
PLAT MORPH BLD: NORMAL
PLATELET # BLD AUTO: 180 10*3/MM3 (ref 140–450)
PMV BLD AUTO: 11.3 FL (ref 6–12)
POTASSIUM SERPL-SCNC: 5.3 MMOL/L (ref 3.5–5.2)
PROT SERPL-MCNC: 6 G/DL (ref 6–8.5)
RBC # BLD AUTO: 4.35 10*6/MM3 (ref 4.14–5.8)
SODIUM SERPL-SCNC: 139 MMOL/L (ref 136–145)
WBC NRBC COR # BLD AUTO: 9.27 10*3/MM3 (ref 3.4–10.8)

## 2024-01-31 PROCEDURE — 86140 C-REACTIVE PROTEIN: CPT | Performed by: PHYSICIAN ASSISTANT

## 2024-01-31 PROCEDURE — 36415 COLL VENOUS BLD VENIPUNCTURE: CPT | Performed by: PHYSICIAN ASSISTANT

## 2024-01-31 PROCEDURE — 82948 REAGENT STRIP/BLOOD GLUCOSE: CPT

## 2024-01-31 PROCEDURE — 85025 COMPLETE CBC W/AUTO DIFF WBC: CPT | Performed by: PHYSICIAN ASSISTANT

## 2024-01-31 PROCEDURE — 80053 COMPREHEN METABOLIC PANEL: CPT | Performed by: PHYSICIAN ASSISTANT

## 2024-01-31 PROCEDURE — 85007 BL SMEAR W/DIFF WBC COUNT: CPT | Performed by: PHYSICIAN ASSISTANT

## 2024-02-01 ENCOUNTER — OUTSIDE FACILITY SERVICE (OUTPATIENT)
Dept: INFECTIOUS DISEASES | Facility: CLINIC | Age: 49
End: 2024-02-01
Payer: MEDICARE

## 2024-02-01 ENCOUNTER — OUTSIDE FACILITY SERVICE (OUTPATIENT)
Dept: PULMONOLOGY | Facility: CLINIC | Age: 49
End: 2024-02-01
Payer: MEDICARE

## 2024-02-01 LAB
ALBUMIN SERPL-MCNC: 3 G/DL (ref 3.5–5.2)
ALBUMIN/GLOB SERPL: 0.9 G/DL
ALP SERPL-CCNC: 124 U/L (ref 39–117)
ALT SERPL W P-5'-P-CCNC: 10 U/L (ref 1–41)
ANION GAP SERPL CALCULATED.3IONS-SCNC: 6.9 MMOL/L (ref 5–15)
ANISOCYTOSIS BLD QL: NORMAL
AST SERPL-CCNC: 11 U/L (ref 1–40)
BASOPHILS # BLD AUTO: 0.03 10*3/MM3 (ref 0–0.2)
BASOPHILS NFR BLD AUTO: 0.3 % (ref 0–1.5)
BILIRUB SERPL-MCNC: 0.5 MG/DL (ref 0–1.2)
BUN SERPL-MCNC: 33 MG/DL (ref 6–20)
BUN/CREAT SERPL: 76.7 (ref 7–25)
CALCIUM SPEC-SCNC: 8.7 MG/DL (ref 8.6–10.5)
CHLORIDE SERPL-SCNC: 103 MMOL/L (ref 98–107)
CO2 SERPL-SCNC: 29.1 MMOL/L (ref 22–29)
CREAT SERPL-MCNC: 0.43 MG/DL (ref 0.76–1.27)
CRP SERPL-MCNC: 7.15 MG/DL (ref 0–0.5)
DACRYOCYTES BLD QL SMEAR: NORMAL
DEPRECATED RDW RBC AUTO: 71.1 FL (ref 37–54)
EGFRCR SERPLBLD CKD-EPI 2021: 133.3 ML/MIN/1.73
EOSINOPHIL # BLD AUTO: 0.24 10*3/MM3 (ref 0–0.4)
EOSINOPHIL NFR BLD AUTO: 2.6 % (ref 0.3–6.2)
ERYTHROCYTE [DISTWIDTH] IN BLOOD BY AUTOMATED COUNT: 22.8 % (ref 12.3–15.4)
GLOBULIN UR ELPH-MCNC: 3.3 GM/DL
GLUCOSE BLDC GLUCOMTR-MCNC: 105 MG/DL (ref 70–130)
GLUCOSE BLDC GLUCOMTR-MCNC: 130 MG/DL (ref 70–130)
GLUCOSE BLDC GLUCOMTR-MCNC: 144 MG/DL (ref 70–130)
GLUCOSE BLDC GLUCOMTR-MCNC: 92 MG/DL (ref 70–130)
GLUCOSE SERPL-MCNC: 101 MG/DL (ref 65–99)
HCT VFR BLD AUTO: 36.7 % (ref 37.5–51)
HGB BLD-MCNC: 10.2 G/DL (ref 13–17.7)
HYPOCHROMIA BLD QL: NORMAL
IMM GRANULOCYTES # BLD AUTO: 0.03 10*3/MM3 (ref 0–0.05)
IMM GRANULOCYTES NFR BLD AUTO: 0.3 % (ref 0–0.5)
LYMPHOCYTES # BLD AUTO: 1.49 10*3/MM3 (ref 0.7–3.1)
LYMPHOCYTES NFR BLD AUTO: 16.4 % (ref 19.6–45.3)
MCH RBC QN AUTO: 24.2 PG (ref 26.6–33)
MCHC RBC AUTO-ENTMCNC: 27.8 G/DL (ref 31.5–35.7)
MCV RBC AUTO: 87.2 FL (ref 79–97)
MONOCYTES # BLD AUTO: 0.52 10*3/MM3 (ref 0.1–0.9)
MONOCYTES NFR BLD AUTO: 5.7 % (ref 5–12)
NEUTROPHILS NFR BLD AUTO: 6.8 10*3/MM3 (ref 1.7–7)
NEUTROPHILS NFR BLD AUTO: 74.7 % (ref 42.7–76)
NRBC BLD AUTO-RTO: 0 /100 WBC (ref 0–0.2)
OVALOCYTES BLD QL SMEAR: NORMAL
PLAT MORPH BLD: NORMAL
PLATELET # BLD AUTO: 160 10*3/MM3 (ref 140–450)
PMV BLD AUTO: 11.3 FL (ref 6–12)
POTASSIUM SERPL-SCNC: 4.1 MMOL/L (ref 3.5–5.2)
PROT SERPL-MCNC: 6.3 G/DL (ref 6–8.5)
RBC # BLD AUTO: 4.21 10*6/MM3 (ref 4.14–5.8)
SODIUM SERPL-SCNC: 139 MMOL/L (ref 136–145)
WBC NRBC COR # BLD AUTO: 9.11 10*3/MM3 (ref 3.4–10.8)

## 2024-02-01 PROCEDURE — 85007 BL SMEAR W/DIFF WBC COUNT: CPT | Performed by: PHYSICIAN ASSISTANT

## 2024-02-01 PROCEDURE — 36415 COLL VENOUS BLD VENIPUNCTURE: CPT | Performed by: PHYSICIAN ASSISTANT

## 2024-02-01 PROCEDURE — 82948 REAGENT STRIP/BLOOD GLUCOSE: CPT

## 2024-02-01 PROCEDURE — 80053 COMPREHEN METABOLIC PANEL: CPT | Performed by: PHYSICIAN ASSISTANT

## 2024-02-01 PROCEDURE — 85025 COMPLETE CBC W/AUTO DIFF WBC: CPT | Performed by: PHYSICIAN ASSISTANT

## 2024-02-01 PROCEDURE — 86140 C-REACTIVE PROTEIN: CPT | Performed by: PHYSICIAN ASSISTANT

## 2024-02-02 ENCOUNTER — OUTSIDE FACILITY SERVICE (OUTPATIENT)
Dept: INFECTIOUS DISEASES | Facility: CLINIC | Age: 49
End: 2024-02-02
Payer: MEDICARE

## 2024-02-02 ENCOUNTER — OUTSIDE FACILITY SERVICE (OUTPATIENT)
Dept: PULMONOLOGY | Facility: CLINIC | Age: 49
End: 2024-02-02
Payer: MEDICARE

## 2024-02-02 LAB
ALBUMIN SERPL-MCNC: 3 G/DL (ref 3.5–5.2)
ALBUMIN/GLOB SERPL: 0.9 G/DL
ALP SERPL-CCNC: 134 U/L (ref 39–117)
ALT SERPL W P-5'-P-CCNC: 12 U/L (ref 1–41)
ANION GAP SERPL CALCULATED.3IONS-SCNC: 8.5 MMOL/L (ref 5–15)
ANISOCYTOSIS BLD QL: NORMAL
AST SERPL-CCNC: 14 U/L (ref 1–40)
BASOPHILS # BLD AUTO: 0.03 10*3/MM3 (ref 0–0.2)
BASOPHILS NFR BLD AUTO: 0.3 % (ref 0–1.5)
BILIRUB SERPL-MCNC: 0.6 MG/DL (ref 0–1.2)
BUN SERPL-MCNC: 34 MG/DL (ref 6–20)
BUN/CREAT SERPL: 75.6 (ref 7–25)
CALCIUM SPEC-SCNC: 8.6 MG/DL (ref 8.6–10.5)
CHLORIDE SERPL-SCNC: 103 MMOL/L (ref 98–107)
CO2 SERPL-SCNC: 27.5 MMOL/L (ref 22–29)
CREAT SERPL-MCNC: 0.45 MG/DL (ref 0.76–1.27)
CRP SERPL-MCNC: 6.52 MG/DL (ref 0–0.5)
DACRYOCYTES BLD QL SMEAR: NORMAL
DEPRECATED RDW RBC AUTO: 71.4 FL (ref 37–54)
EGFRCR SERPLBLD CKD-EPI 2021: 131.5 ML/MIN/1.73
ELLIPTOCYTES BLD QL SMEAR: NORMAL
EOSINOPHIL # BLD AUTO: 0.24 10*3/MM3 (ref 0–0.4)
EOSINOPHIL NFR BLD AUTO: 2.8 % (ref 0.3–6.2)
ERYTHROCYTE [DISTWIDTH] IN BLOOD BY AUTOMATED COUNT: 22.6 % (ref 12.3–15.4)
GLOBULIN UR ELPH-MCNC: 3.2 GM/DL
GLUCOSE BLDC GLUCOMTR-MCNC: 105 MG/DL (ref 70–130)
GLUCOSE BLDC GLUCOMTR-MCNC: 126 MG/DL (ref 70–130)
GLUCOSE BLDC GLUCOMTR-MCNC: 146 MG/DL (ref 70–130)
GLUCOSE BLDC GLUCOMTR-MCNC: 89 MG/DL (ref 70–130)
GLUCOSE SERPL-MCNC: 101 MG/DL (ref 65–99)
HCT VFR BLD AUTO: 36.2 % (ref 37.5–51)
HGB BLD-MCNC: 10.2 G/DL (ref 13–17.7)
HYPOCHROMIA BLD QL: NORMAL
IMM GRANULOCYTES # BLD AUTO: 0.02 10*3/MM3 (ref 0–0.05)
IMM GRANULOCYTES NFR BLD AUTO: 0.2 % (ref 0–0.5)
LYMPHOCYTES # BLD AUTO: 1.35 10*3/MM3 (ref 0.7–3.1)
LYMPHOCYTES NFR BLD AUTO: 15.6 % (ref 19.6–45.3)
MCH RBC QN AUTO: 24.7 PG (ref 26.6–33)
MCHC RBC AUTO-ENTMCNC: 28.2 G/DL (ref 31.5–35.7)
MCV RBC AUTO: 87.7 FL (ref 79–97)
MONOCYTES # BLD AUTO: 0.46 10*3/MM3 (ref 0.1–0.9)
MONOCYTES NFR BLD AUTO: 5.3 % (ref 5–12)
NEUTROPHILS NFR BLD AUTO: 6.57 10*3/MM3 (ref 1.7–7)
NEUTROPHILS NFR BLD AUTO: 75.8 % (ref 42.7–76)
NRBC BLD AUTO-RTO: 0 /100 WBC (ref 0–0.2)
PLAT MORPH BLD: NORMAL
PLATELET # BLD AUTO: 141 10*3/MM3 (ref 140–450)
PMV BLD AUTO: 12.1 FL (ref 6–12)
POTASSIUM SERPL-SCNC: 4.4 MMOL/L (ref 3.5–5.2)
PROT SERPL-MCNC: 6.2 G/DL (ref 6–8.5)
RBC # BLD AUTO: 4.13 10*6/MM3 (ref 4.14–5.8)
SODIUM SERPL-SCNC: 139 MMOL/L (ref 136–145)
WBC NRBC COR # BLD AUTO: 8.67 10*3/MM3 (ref 3.4–10.8)

## 2024-02-02 PROCEDURE — 85025 COMPLETE CBC W/AUTO DIFF WBC: CPT | Performed by: PHYSICIAN ASSISTANT

## 2024-02-02 PROCEDURE — 36415 COLL VENOUS BLD VENIPUNCTURE: CPT | Performed by: PHYSICIAN ASSISTANT

## 2024-02-02 PROCEDURE — 86140 C-REACTIVE PROTEIN: CPT | Performed by: PHYSICIAN ASSISTANT

## 2024-02-02 PROCEDURE — 82948 REAGENT STRIP/BLOOD GLUCOSE: CPT

## 2024-02-02 PROCEDURE — 80053 COMPREHEN METABOLIC PANEL: CPT | Performed by: PHYSICIAN ASSISTANT

## 2024-02-02 PROCEDURE — 85007 BL SMEAR W/DIFF WBC COUNT: CPT | Performed by: PHYSICIAN ASSISTANT

## 2024-02-03 ENCOUNTER — OUTSIDE FACILITY SERVICE (OUTPATIENT)
Dept: PULMONOLOGY | Facility: CLINIC | Age: 49
End: 2024-02-03
Payer: MEDICARE

## 2024-02-03 LAB
ALBUMIN SERPL-MCNC: 2.9 G/DL (ref 3.5–5.2)
ALBUMIN/GLOB SERPL: 0.9 G/DL
ALP SERPL-CCNC: 131 U/L (ref 39–117)
ALT SERPL W P-5'-P-CCNC: 13 U/L (ref 1–41)
ANION GAP SERPL CALCULATED.3IONS-SCNC: 9.1 MMOL/L (ref 5–15)
ANISOCYTOSIS BLD QL: NORMAL
AST SERPL-CCNC: 15 U/L (ref 1–40)
BASOPHILS # BLD AUTO: 0.03 10*3/MM3 (ref 0–0.2)
BASOPHILS NFR BLD AUTO: 0.4 % (ref 0–1.5)
BILIRUB SERPL-MCNC: 0.5 MG/DL (ref 0–1.2)
BUN SERPL-MCNC: 38 MG/DL (ref 6–20)
BUN/CREAT SERPL: 84.4 (ref 7–25)
CALCIUM SPEC-SCNC: 8.4 MG/DL (ref 8.6–10.5)
CHLORIDE SERPL-SCNC: 101 MMOL/L (ref 98–107)
CO2 SERPL-SCNC: 26.9 MMOL/L (ref 22–29)
CREAT SERPL-MCNC: 0.45 MG/DL (ref 0.76–1.27)
CRP SERPL-MCNC: 5.74 MG/DL (ref 0–0.5)
DACRYOCYTES BLD QL SMEAR: NORMAL
DEPRECATED RDW RBC AUTO: 71.7 FL (ref 37–54)
EGFRCR SERPLBLD CKD-EPI 2021: 131.5 ML/MIN/1.73
ELLIPTOCYTES BLD QL SMEAR: NORMAL
EOSINOPHIL # BLD AUTO: 0.26 10*3/MM3 (ref 0–0.4)
EOSINOPHIL NFR BLD AUTO: 3.3 % (ref 0.3–6.2)
ERYTHROCYTE [DISTWIDTH] IN BLOOD BY AUTOMATED COUNT: 22.5 % (ref 12.3–15.4)
GLOBULIN UR ELPH-MCNC: 3.3 GM/DL
GLUCOSE BLDC GLUCOMTR-MCNC: 112 MG/DL (ref 70–130)
GLUCOSE BLDC GLUCOMTR-MCNC: 115 MG/DL (ref 70–130)
GLUCOSE BLDC GLUCOMTR-MCNC: 77 MG/DL (ref 70–130)
GLUCOSE BLDC GLUCOMTR-MCNC: 92 MG/DL (ref 70–130)
GLUCOSE SERPL-MCNC: 110 MG/DL (ref 65–99)
HCT VFR BLD AUTO: 35.5 % (ref 37.5–51)
HGB BLD-MCNC: 9.7 G/DL (ref 13–17.7)
HYPOCHROMIA BLD QL: NORMAL
IMM GRANULOCYTES # BLD AUTO: 0.02 10*3/MM3 (ref 0–0.05)
IMM GRANULOCYTES NFR BLD AUTO: 0.3 % (ref 0–0.5)
LYMPHOCYTES # BLD AUTO: 1.65 10*3/MM3 (ref 0.7–3.1)
LYMPHOCYTES NFR BLD AUTO: 21 % (ref 19.6–45.3)
MCH RBC QN AUTO: 24.1 PG (ref 26.6–33)
MCHC RBC AUTO-ENTMCNC: 27.3 G/DL (ref 31.5–35.7)
MCV RBC AUTO: 88.1 FL (ref 79–97)
MONOCYTES # BLD AUTO: 0.5 10*3/MM3 (ref 0.1–0.9)
MONOCYTES NFR BLD AUTO: 6.4 % (ref 5–12)
NEUTROPHILS NFR BLD AUTO: 5.38 10*3/MM3 (ref 1.7–7)
NEUTROPHILS NFR BLD AUTO: 68.6 % (ref 42.7–76)
NRBC BLD AUTO-RTO: 0 /100 WBC (ref 0–0.2)
PLAT MORPH BLD: NORMAL
PLATELET # BLD AUTO: 165 10*3/MM3 (ref 140–450)
PMV BLD AUTO: 11.9 FL (ref 6–12)
POTASSIUM SERPL-SCNC: 4.6 MMOL/L (ref 3.5–5.2)
PROT SERPL-MCNC: 6.2 G/DL (ref 6–8.5)
RBC # BLD AUTO: 4.03 10*6/MM3 (ref 4.14–5.8)
SODIUM SERPL-SCNC: 137 MMOL/L (ref 136–145)
STOMATOCYTES BLD QL SMEAR: NORMAL
WBC NRBC COR # BLD AUTO: 7.84 10*3/MM3 (ref 3.4–10.8)

## 2024-02-03 PROCEDURE — 80053 COMPREHEN METABOLIC PANEL: CPT | Performed by: PHYSICIAN ASSISTANT

## 2024-02-03 PROCEDURE — 82948 REAGENT STRIP/BLOOD GLUCOSE: CPT

## 2024-02-03 PROCEDURE — 85007 BL SMEAR W/DIFF WBC COUNT: CPT | Performed by: PHYSICIAN ASSISTANT

## 2024-02-03 PROCEDURE — 85025 COMPLETE CBC W/AUTO DIFF WBC: CPT | Performed by: PHYSICIAN ASSISTANT

## 2024-02-03 PROCEDURE — 86140 C-REACTIVE PROTEIN: CPT | Performed by: PHYSICIAN ASSISTANT

## 2024-02-04 ENCOUNTER — OUTSIDE FACILITY SERVICE (OUTPATIENT)
Dept: PULMONOLOGY | Facility: CLINIC | Age: 49
End: 2024-02-04
Payer: MEDICARE

## 2024-02-04 LAB
ALBUMIN SERPL-MCNC: 2.7 G/DL (ref 3.5–5.2)
ALBUMIN/GLOB SERPL: 0.8 G/DL
ALP SERPL-CCNC: 133 U/L (ref 39–117)
ALT SERPL W P-5'-P-CCNC: 13 U/L (ref 1–41)
ANION GAP SERPL CALCULATED.3IONS-SCNC: 8.2 MMOL/L (ref 5–15)
ANISOCYTOSIS BLD QL: NORMAL
AST SERPL-CCNC: 18 U/L (ref 1–40)
BACTERIA SPEC AEROBE CULT: NORMAL
BACTERIA SPEC AEROBE CULT: NORMAL
BASOPHILS # BLD AUTO: 0.03 10*3/MM3 (ref 0–0.2)
BASOPHILS NFR BLD AUTO: 0.4 % (ref 0–1.5)
BILIRUB SERPL-MCNC: 0.3 MG/DL (ref 0–1.2)
BUN SERPL-MCNC: 33 MG/DL (ref 6–20)
BUN/CREAT SERPL: 70.2 (ref 7–25)
CALCIUM SPEC-SCNC: 8.2 MG/DL (ref 8.6–10.5)
CHLORIDE SERPL-SCNC: 103 MMOL/L (ref 98–107)
CO2 SERPL-SCNC: 22.8 MMOL/L (ref 22–29)
CREAT SERPL-MCNC: 0.47 MG/DL (ref 0.76–1.27)
CRP SERPL-MCNC: 5.25 MG/DL (ref 0–0.5)
DEPRECATED RDW RBC AUTO: 70.3 FL (ref 37–54)
EGFRCR SERPLBLD CKD-EPI 2021: 129.8 ML/MIN/1.73
EOSINOPHIL # BLD AUTO: 0.2 10*3/MM3 (ref 0–0.4)
EOSINOPHIL NFR BLD AUTO: 2.5 % (ref 0.3–6.2)
ERYTHROCYTE [DISTWIDTH] IN BLOOD BY AUTOMATED COUNT: 22.6 % (ref 12.3–15.4)
GLOBULIN UR ELPH-MCNC: 3.4 GM/DL
GLUCOSE BLDC GLUCOMTR-MCNC: 103 MG/DL (ref 70–130)
GLUCOSE BLDC GLUCOMTR-MCNC: 116 MG/DL (ref 70–130)
GLUCOSE BLDC GLUCOMTR-MCNC: 82 MG/DL (ref 70–130)
GLUCOSE BLDC GLUCOMTR-MCNC: 93 MG/DL (ref 70–130)
GLUCOSE SERPL-MCNC: 92 MG/DL (ref 65–99)
HCT VFR BLD AUTO: 35 % (ref 37.5–51)
HGB BLD-MCNC: 10 G/DL (ref 13–17.7)
HYPOCHROMIA BLD QL: NORMAL
IMM GRANULOCYTES # BLD AUTO: 0.02 10*3/MM3 (ref 0–0.05)
IMM GRANULOCYTES NFR BLD AUTO: 0.3 % (ref 0–0.5)
LYMPHOCYTES # BLD AUTO: 1.54 10*3/MM3 (ref 0.7–3.1)
LYMPHOCYTES NFR BLD AUTO: 19.3 % (ref 19.6–45.3)
MCH RBC QN AUTO: 24.7 PG (ref 26.6–33)
MCHC RBC AUTO-ENTMCNC: 28.6 G/DL (ref 31.5–35.7)
MCV RBC AUTO: 86.4 FL (ref 79–97)
MONOCYTES # BLD AUTO: 0.56 10*3/MM3 (ref 0.1–0.9)
MONOCYTES NFR BLD AUTO: 7 % (ref 5–12)
NEUTROPHILS NFR BLD AUTO: 5.62 10*3/MM3 (ref 1.7–7)
NEUTROPHILS NFR BLD AUTO: 70.5 % (ref 42.7–76)
NRBC BLD AUTO-RTO: 0 /100 WBC (ref 0–0.2)
OVALOCYTES BLD QL SMEAR: NORMAL
PLATELET # BLD AUTO: 138 10*3/MM3 (ref 140–450)
PMV BLD AUTO: 11.8 FL (ref 6–12)
POTASSIUM SERPL-SCNC: 4.6 MMOL/L (ref 3.5–5.2)
PROT SERPL-MCNC: 6.1 G/DL (ref 6–8.5)
RBC # BLD AUTO: 4.05 10*6/MM3 (ref 4.14–5.8)
SMALL PLATELETS BLD QL SMEAR: ADEQUATE
SODIUM SERPL-SCNC: 134 MMOL/L (ref 136–145)
WBC NRBC COR # BLD AUTO: 7.97 10*3/MM3 (ref 3.4–10.8)

## 2024-02-04 PROCEDURE — 82948 REAGENT STRIP/BLOOD GLUCOSE: CPT

## 2024-02-04 PROCEDURE — 80053 COMPREHEN METABOLIC PANEL: CPT | Performed by: PHYSICIAN ASSISTANT

## 2024-02-04 PROCEDURE — 85025 COMPLETE CBC W/AUTO DIFF WBC: CPT | Performed by: PHYSICIAN ASSISTANT

## 2024-02-04 PROCEDURE — 36415 COLL VENOUS BLD VENIPUNCTURE: CPT | Performed by: PHYSICIAN ASSISTANT

## 2024-02-04 PROCEDURE — 85007 BL SMEAR W/DIFF WBC COUNT: CPT | Performed by: PHYSICIAN ASSISTANT

## 2024-02-04 PROCEDURE — 86140 C-REACTIVE PROTEIN: CPT | Performed by: PHYSICIAN ASSISTANT

## 2024-02-05 ENCOUNTER — OUTSIDE FACILITY SERVICE (OUTPATIENT)
Dept: INFECTIOUS DISEASES | Facility: CLINIC | Age: 49
End: 2024-02-05
Payer: MEDICARE

## 2024-02-05 ENCOUNTER — OUTSIDE FACILITY SERVICE (OUTPATIENT)
Dept: PULMONOLOGY | Facility: CLINIC | Age: 49
End: 2024-02-05
Payer: MEDICARE

## 2024-02-05 LAB
ALBUMIN SERPL-MCNC: 2.9 G/DL (ref 3.5–5.2)
ALBUMIN/GLOB SERPL: 0.8 G/DL
ALP SERPL-CCNC: 151 U/L (ref 39–117)
ALT SERPL W P-5'-P-CCNC: 16 U/L (ref 1–41)
ANION GAP SERPL CALCULATED.3IONS-SCNC: 8.6 MMOL/L (ref 5–15)
ANISOCYTOSIS BLD QL: NORMAL
AST SERPL-CCNC: 18 U/L (ref 1–40)
BASOPHILS # BLD AUTO: 0.04 10*3/MM3 (ref 0–0.2)
BASOPHILS NFR BLD AUTO: 0.4 % (ref 0–1.5)
BILIRUB SERPL-MCNC: 0.4 MG/DL (ref 0–1.2)
BUN SERPL-MCNC: 34 MG/DL (ref 6–20)
BUN/CREAT SERPL: 72.3 (ref 7–25)
CALCIUM SPEC-SCNC: 8.3 MG/DL (ref 8.6–10.5)
CHLORIDE SERPL-SCNC: 102 MMOL/L (ref 98–107)
CO2 SERPL-SCNC: 25.4 MMOL/L (ref 22–29)
CREAT SERPL-MCNC: 0.47 MG/DL (ref 0.76–1.27)
CRP SERPL-MCNC: 5.69 MG/DL (ref 0–0.5)
DEPRECATED RDW RBC AUTO: 69.8 FL (ref 37–54)
EGFRCR SERPLBLD CKD-EPI 2021: 129.8 ML/MIN/1.73
ELLIPTOCYTES BLD QL SMEAR: NORMAL
EOSINOPHIL # BLD AUTO: 0.21 10*3/MM3 (ref 0–0.4)
EOSINOPHIL NFR BLD AUTO: 2.3 % (ref 0.3–6.2)
ERYTHROCYTE [DISTWIDTH] IN BLOOD BY AUTOMATED COUNT: 22.4 % (ref 12.3–15.4)
GLOBULIN UR ELPH-MCNC: 3.6 GM/DL
GLUCOSE BLDC GLUCOMTR-MCNC: 100 MG/DL (ref 70–130)
GLUCOSE BLDC GLUCOMTR-MCNC: 123 MG/DL (ref 70–130)
GLUCOSE BLDC GLUCOMTR-MCNC: 79 MG/DL (ref 70–130)
GLUCOSE BLDC GLUCOMTR-MCNC: 85 MG/DL (ref 70–130)
GLUCOSE SERPL-MCNC: 115 MG/DL (ref 65–99)
HCT VFR BLD AUTO: 35.3 % (ref 37.5–51)
HGB BLD-MCNC: 10 G/DL (ref 13–17.7)
HYPOCHROMIA BLD QL: NORMAL
IMM GRANULOCYTES # BLD AUTO: 0.03 10*3/MM3 (ref 0–0.05)
IMM GRANULOCYTES NFR BLD AUTO: 0.3 % (ref 0–0.5)
LYMPHOCYTES # BLD AUTO: 1.67 10*3/MM3 (ref 0.7–3.1)
LYMPHOCYTES NFR BLD AUTO: 18.6 % (ref 19.6–45.3)
MCH RBC QN AUTO: 24.6 PG (ref 26.6–33)
MCHC RBC AUTO-ENTMCNC: 28.3 G/DL (ref 31.5–35.7)
MCV RBC AUTO: 86.7 FL (ref 79–97)
MONOCYTES # BLD AUTO: 0.6 10*3/MM3 (ref 0.1–0.9)
MONOCYTES NFR BLD AUTO: 6.7 % (ref 5–12)
NEUTROPHILS NFR BLD AUTO: 6.45 10*3/MM3 (ref 1.7–7)
NEUTROPHILS NFR BLD AUTO: 71.7 % (ref 42.7–76)
NRBC BLD AUTO-RTO: 0 /100 WBC (ref 0–0.2)
PLAT MORPH BLD: NORMAL
PLATELET # BLD AUTO: 194 10*3/MM3 (ref 140–450)
PMV BLD AUTO: 11.8 FL (ref 6–12)
POTASSIUM SERPL-SCNC: 4.5 MMOL/L (ref 3.5–5.2)
PROT SERPL-MCNC: 6.5 G/DL (ref 6–8.5)
RBC # BLD AUTO: 4.07 10*6/MM3 (ref 4.14–5.8)
SODIUM SERPL-SCNC: 136 MMOL/L (ref 136–145)
STOMATOCYTES BLD QL SMEAR: NORMAL
WBC NRBC COR # BLD AUTO: 9 10*3/MM3 (ref 3.4–10.8)

## 2024-02-05 PROCEDURE — 85007 BL SMEAR W/DIFF WBC COUNT: CPT | Performed by: PHYSICIAN ASSISTANT

## 2024-02-05 PROCEDURE — 86140 C-REACTIVE PROTEIN: CPT | Performed by: PHYSICIAN ASSISTANT

## 2024-02-05 PROCEDURE — 85025 COMPLETE CBC W/AUTO DIFF WBC: CPT | Performed by: PHYSICIAN ASSISTANT

## 2024-02-05 PROCEDURE — 82948 REAGENT STRIP/BLOOD GLUCOSE: CPT

## 2024-02-05 PROCEDURE — 80053 COMPREHEN METABOLIC PANEL: CPT | Performed by: PHYSICIAN ASSISTANT

## 2024-02-05 PROCEDURE — 36415 COLL VENOUS BLD VENIPUNCTURE: CPT | Performed by: PHYSICIAN ASSISTANT

## 2024-02-06 ENCOUNTER — OUTSIDE FACILITY SERVICE (OUTPATIENT)
Dept: INFECTIOUS DISEASES | Facility: CLINIC | Age: 49
End: 2024-02-06
Payer: MEDICARE

## 2024-02-06 ENCOUNTER — OUTSIDE FACILITY SERVICE (OUTPATIENT)
Dept: PULMONOLOGY | Facility: CLINIC | Age: 49
End: 2024-02-06
Payer: MEDICARE

## 2024-02-06 LAB
ALBUMIN SERPL-MCNC: 2.9 G/DL (ref 3.5–5.2)
ALBUMIN/GLOB SERPL: 0.9 G/DL
ALP SERPL-CCNC: 155 U/L (ref 39–117)
ALT SERPL W P-5'-P-CCNC: 16 U/L (ref 1–41)
ANION GAP SERPL CALCULATED.3IONS-SCNC: 10.1 MMOL/L (ref 5–15)
ANISOCYTOSIS BLD QL: NORMAL
AST SERPL-CCNC: 19 U/L (ref 1–40)
BASOPHILS # BLD AUTO: 0.03 10*3/MM3 (ref 0–0.2)
BASOPHILS NFR BLD AUTO: 0.4 % (ref 0–1.5)
BILIRUB SERPL-MCNC: 0.4 MG/DL (ref 0–1.2)
BUN SERPL-MCNC: 37 MG/DL (ref 6–20)
BUN/CREAT SERPL: 58.7 (ref 7–25)
CALCIUM SPEC-SCNC: 8.3 MG/DL (ref 8.6–10.5)
CHLORIDE SERPL-SCNC: 102 MMOL/L (ref 98–107)
CO2 SERPL-SCNC: 24.9 MMOL/L (ref 22–29)
CREAT SERPL-MCNC: 0.63 MG/DL (ref 0.76–1.27)
CRP SERPL-MCNC: 7.05 MG/DL (ref 0–0.5)
DEPRECATED RDW RBC AUTO: 70.9 FL (ref 37–54)
EGFRCR SERPLBLD CKD-EPI 2021: 118.8 ML/MIN/1.73
ELLIPTOCYTES BLD QL SMEAR: NORMAL
EOSINOPHIL # BLD AUTO: 0.24 10*3/MM3 (ref 0–0.4)
EOSINOPHIL NFR BLD AUTO: 2.9 % (ref 0.3–6.2)
ERYTHROCYTE [DISTWIDTH] IN BLOOD BY AUTOMATED COUNT: 22.5 % (ref 12.3–15.4)
GLOBULIN UR ELPH-MCNC: 3.3 GM/DL
GLUCOSE BLDC GLUCOMTR-MCNC: 114 MG/DL (ref 70–130)
GLUCOSE BLDC GLUCOMTR-MCNC: 118 MG/DL (ref 70–130)
GLUCOSE BLDC GLUCOMTR-MCNC: 125 MG/DL (ref 70–130)
GLUCOSE BLDC GLUCOMTR-MCNC: 75 MG/DL (ref 70–130)
GLUCOSE SERPL-MCNC: 78 MG/DL (ref 65–99)
HCT VFR BLD AUTO: 34.2 % (ref 37.5–51)
HGB BLD-MCNC: 9.7 G/DL (ref 13–17.7)
HYPOCHROMIA BLD QL: NORMAL
IMM GRANULOCYTES # BLD AUTO: 0.02 10*3/MM3 (ref 0–0.05)
IMM GRANULOCYTES NFR BLD AUTO: 0.2 % (ref 0–0.5)
LYMPHOCYTES # BLD AUTO: 1.84 10*3/MM3 (ref 0.7–3.1)
LYMPHOCYTES NFR BLD AUTO: 22.3 % (ref 19.6–45.3)
MCH RBC QN AUTO: 24.7 PG (ref 26.6–33)
MCHC RBC AUTO-ENTMCNC: 28.4 G/DL (ref 31.5–35.7)
MCV RBC AUTO: 87 FL (ref 79–97)
MONOCYTES # BLD AUTO: 0.56 10*3/MM3 (ref 0.1–0.9)
MONOCYTES NFR BLD AUTO: 6.8 % (ref 5–12)
NEUTROPHILS NFR BLD AUTO: 5.55 10*3/MM3 (ref 1.7–7)
NEUTROPHILS NFR BLD AUTO: 67.4 % (ref 42.7–76)
NRBC BLD AUTO-RTO: 0 /100 WBC (ref 0–0.2)
PLAT MORPH BLD: NORMAL
PLATELET # BLD AUTO: 186 10*3/MM3 (ref 140–450)
PMV BLD AUTO: 11.9 FL (ref 6–12)
POTASSIUM SERPL-SCNC: 4.5 MMOL/L (ref 3.5–5.2)
PROT SERPL-MCNC: 6.2 G/DL (ref 6–8.5)
RBC # BLD AUTO: 3.93 10*6/MM3 (ref 4.14–5.8)
SODIUM SERPL-SCNC: 137 MMOL/L (ref 136–145)
WBC NRBC COR # BLD AUTO: 8.24 10*3/MM3 (ref 3.4–10.8)

## 2024-02-06 PROCEDURE — 80053 COMPREHEN METABOLIC PANEL: CPT | Performed by: PHYSICIAN ASSISTANT

## 2024-02-06 PROCEDURE — 86140 C-REACTIVE PROTEIN: CPT | Performed by: PHYSICIAN ASSISTANT

## 2024-02-06 PROCEDURE — 85007 BL SMEAR W/DIFF WBC COUNT: CPT | Performed by: PHYSICIAN ASSISTANT

## 2024-02-06 PROCEDURE — 85025 COMPLETE CBC W/AUTO DIFF WBC: CPT | Performed by: PHYSICIAN ASSISTANT

## 2024-02-06 PROCEDURE — 36415 COLL VENOUS BLD VENIPUNCTURE: CPT | Performed by: PHYSICIAN ASSISTANT

## 2024-02-06 PROCEDURE — 82948 REAGENT STRIP/BLOOD GLUCOSE: CPT

## 2024-02-07 ENCOUNTER — OUTSIDE FACILITY SERVICE (OUTPATIENT)
Dept: PULMONOLOGY | Facility: CLINIC | Age: 49
End: 2024-02-07
Payer: MEDICARE

## 2024-02-07 ENCOUNTER — OUTSIDE FACILITY SERVICE (OUTPATIENT)
Dept: INFECTIOUS DISEASES | Facility: CLINIC | Age: 49
End: 2024-02-07
Payer: MEDICARE

## 2024-02-07 LAB
ALBUMIN SERPL-MCNC: 2.8 G/DL (ref 3.5–5.2)
ALBUMIN/GLOB SERPL: 0.9 G/DL
ALP SERPL-CCNC: 158 U/L (ref 39–117)
ALT SERPL W P-5'-P-CCNC: 16 U/L (ref 1–41)
ANION GAP SERPL CALCULATED.3IONS-SCNC: 9.8 MMOL/L (ref 5–15)
ANISOCYTOSIS BLD QL: NORMAL
AST SERPL-CCNC: 16 U/L (ref 1–40)
BASOPHILS # BLD AUTO: 0.03 10*3/MM3 (ref 0–0.2)
BASOPHILS NFR BLD AUTO: 0.4 % (ref 0–1.5)
BILIRUB SERPL-MCNC: 0.4 MG/DL (ref 0–1.2)
BUN SERPL-MCNC: 38 MG/DL (ref 6–20)
BUN/CREAT SERPL: 74.5 (ref 7–25)
CALCIUM SPEC-SCNC: 8.1 MG/DL (ref 8.6–10.5)
CHLORIDE SERPL-SCNC: 103 MMOL/L (ref 98–107)
CO2 SERPL-SCNC: 24.2 MMOL/L (ref 22–29)
CREAT SERPL-MCNC: 0.51 MG/DL (ref 0.76–1.27)
CRP SERPL-MCNC: 5.91 MG/DL (ref 0–0.5)
DEPRECATED RDW RBC AUTO: 70.7 FL (ref 37–54)
EGFRCR SERPLBLD CKD-EPI 2021: 126.6 ML/MIN/1.73
ELLIPTOCYTES BLD QL SMEAR: NORMAL
EOSINOPHIL # BLD AUTO: 0.19 10*3/MM3 (ref 0–0.4)
EOSINOPHIL NFR BLD AUTO: 2.4 % (ref 0.3–6.2)
ERYTHROCYTE [DISTWIDTH] IN BLOOD BY AUTOMATED COUNT: 22.5 % (ref 12.3–15.4)
GLOBULIN UR ELPH-MCNC: 3.2 GM/DL
GLUCOSE BLDC GLUCOMTR-MCNC: 126 MG/DL (ref 70–130)
GLUCOSE BLDC GLUCOMTR-MCNC: 133 MG/DL (ref 70–130)
GLUCOSE BLDC GLUCOMTR-MCNC: 139 MG/DL (ref 70–130)
GLUCOSE BLDC GLUCOMTR-MCNC: 244 MG/DL (ref 70–130)
GLUCOSE SERPL-MCNC: 93 MG/DL (ref 65–99)
HCT VFR BLD AUTO: 34 % (ref 37.5–51)
HGB BLD-MCNC: 9.7 G/DL (ref 13–17.7)
HYPOCHROMIA BLD QL: NORMAL
IMM GRANULOCYTES # BLD AUTO: 0.02 10*3/MM3 (ref 0–0.05)
IMM GRANULOCYTES NFR BLD AUTO: 0.3 % (ref 0–0.5)
LYMPHOCYTES # BLD AUTO: 1.63 10*3/MM3 (ref 0.7–3.1)
LYMPHOCYTES NFR BLD AUTO: 20.6 % (ref 19.6–45.3)
MCH RBC QN AUTO: 24.9 PG (ref 26.6–33)
MCHC RBC AUTO-ENTMCNC: 28.5 G/DL (ref 31.5–35.7)
MCV RBC AUTO: 87.4 FL (ref 79–97)
MONOCYTES # BLD AUTO: 0.53 10*3/MM3 (ref 0.1–0.9)
MONOCYTES NFR BLD AUTO: 6.7 % (ref 5–12)
NEUTROPHILS NFR BLD AUTO: 5.51 10*3/MM3 (ref 1.7–7)
NEUTROPHILS NFR BLD AUTO: 69.6 % (ref 42.7–76)
NRBC BLD AUTO-RTO: 0 /100 WBC (ref 0–0.2)
PLAT MORPH BLD: NORMAL
PLATELET # BLD AUTO: 205 10*3/MM3 (ref 140–450)
PMV BLD AUTO: 11.8 FL (ref 6–12)
POTASSIUM SERPL-SCNC: 4.5 MMOL/L (ref 3.5–5.2)
PROT SERPL-MCNC: 6 G/DL (ref 6–8.5)
RBC # BLD AUTO: 3.89 10*6/MM3 (ref 4.14–5.8)
SODIUM SERPL-SCNC: 137 MMOL/L (ref 136–145)
WBC NRBC COR # BLD AUTO: 7.91 10*3/MM3 (ref 3.4–10.8)

## 2024-02-07 PROCEDURE — 86140 C-REACTIVE PROTEIN: CPT | Performed by: PHYSICIAN ASSISTANT

## 2024-02-07 PROCEDURE — 85025 COMPLETE CBC W/AUTO DIFF WBC: CPT | Performed by: PHYSICIAN ASSISTANT

## 2024-02-07 PROCEDURE — 36415 COLL VENOUS BLD VENIPUNCTURE: CPT | Performed by: PHYSICIAN ASSISTANT

## 2024-02-07 PROCEDURE — 82948 REAGENT STRIP/BLOOD GLUCOSE: CPT

## 2024-02-07 PROCEDURE — 85007 BL SMEAR W/DIFF WBC COUNT: CPT | Performed by: PHYSICIAN ASSISTANT

## 2024-02-07 PROCEDURE — 80053 COMPREHEN METABOLIC PANEL: CPT | Performed by: PHYSICIAN ASSISTANT

## 2024-02-08 ENCOUNTER — OUTSIDE FACILITY SERVICE (OUTPATIENT)
Dept: INFECTIOUS DISEASES | Facility: CLINIC | Age: 49
End: 2024-02-08
Payer: MEDICARE

## 2024-02-08 ENCOUNTER — OUTSIDE FACILITY SERVICE (OUTPATIENT)
Dept: PULMONOLOGY | Facility: CLINIC | Age: 49
End: 2024-02-08
Payer: MEDICARE

## 2024-02-08 LAB
ALBUMIN SERPL-MCNC: 3 G/DL (ref 3.5–5.2)
ALBUMIN/GLOB SERPL: 0.9 G/DL
ALP SERPL-CCNC: 168 U/L (ref 39–117)
ALT SERPL W P-5'-P-CCNC: 19 U/L (ref 1–41)
ANION GAP SERPL CALCULATED.3IONS-SCNC: 7.9 MMOL/L (ref 5–15)
ANISOCYTOSIS BLD QL: NORMAL
AST SERPL-CCNC: 16 U/L (ref 1–40)
BASOPHILS # BLD AUTO: 0.04 10*3/MM3 (ref 0–0.2)
BASOPHILS NFR BLD AUTO: 0.5 % (ref 0–1.5)
BILIRUB SERPL-MCNC: 0.4 MG/DL (ref 0–1.2)
BUN SERPL-MCNC: 34 MG/DL (ref 6–20)
BUN/CREAT SERPL: 72.3 (ref 7–25)
CALCIUM SPEC-SCNC: 8.3 MG/DL (ref 8.6–10.5)
CHLORIDE SERPL-SCNC: 101 MMOL/L (ref 98–107)
CO2 SERPL-SCNC: 27.1 MMOL/L (ref 22–29)
CREAT SERPL-MCNC: 0.47 MG/DL (ref 0.76–1.27)
CRP SERPL-MCNC: 5.69 MG/DL (ref 0–0.5)
DEPRECATED RDW RBC AUTO: 70.6 FL (ref 37–54)
EGFRCR SERPLBLD CKD-EPI 2021: 129.8 ML/MIN/1.73
ELLIPTOCYTES BLD QL SMEAR: NORMAL
EOSINOPHIL # BLD AUTO: 0.22 10*3/MM3 (ref 0–0.4)
EOSINOPHIL NFR BLD AUTO: 2.8 % (ref 0.3–6.2)
ERYTHROCYTE [DISTWIDTH] IN BLOOD BY AUTOMATED COUNT: 22.5 % (ref 12.3–15.4)
GLOBULIN UR ELPH-MCNC: 3.4 GM/DL
GLUCOSE BLDC GLUCOMTR-MCNC: 109 MG/DL (ref 70–130)
GLUCOSE BLDC GLUCOMTR-MCNC: 82 MG/DL (ref 70–130)
GLUCOSE SERPL-MCNC: 84 MG/DL (ref 65–99)
HCT VFR BLD AUTO: 34.6 % (ref 37.5–51)
HGB BLD-MCNC: 9.8 G/DL (ref 13–17.7)
HYPOCHROMIA BLD QL: NORMAL
IMM GRANULOCYTES # BLD AUTO: 0.02 10*3/MM3 (ref 0–0.05)
IMM GRANULOCYTES NFR BLD AUTO: 0.3 % (ref 0–0.5)
LARGE PLATELETS: NORMAL
LYMPHOCYTES # BLD AUTO: 1.89 10*3/MM3 (ref 0.7–3.1)
LYMPHOCYTES NFR BLD AUTO: 24.1 % (ref 19.6–45.3)
MCH RBC QN AUTO: 24.7 PG (ref 26.6–33)
MCHC RBC AUTO-ENTMCNC: 28.3 G/DL (ref 31.5–35.7)
MCV RBC AUTO: 87.4 FL (ref 79–97)
MONOCYTES # BLD AUTO: 0.47 10*3/MM3 (ref 0.1–0.9)
MONOCYTES NFR BLD AUTO: 6 % (ref 5–12)
NEUTROPHILS NFR BLD AUTO: 5.2 10*3/MM3 (ref 1.7–7)
NEUTROPHILS NFR BLD AUTO: 66.3 % (ref 42.7–76)
NRBC BLD AUTO-RTO: 0 /100 WBC (ref 0–0.2)
PLATELET # BLD AUTO: 235 10*3/MM3 (ref 140–450)
PMV BLD AUTO: 11.9 FL (ref 6–12)
POTASSIUM SERPL-SCNC: 4.6 MMOL/L (ref 3.5–5.2)
PROT SERPL-MCNC: 6.4 G/DL (ref 6–8.5)
RBC # BLD AUTO: 3.96 10*6/MM3 (ref 4.14–5.8)
SODIUM SERPL-SCNC: 136 MMOL/L (ref 136–145)
WBC NRBC COR # BLD AUTO: 7.84 10*3/MM3 (ref 3.4–10.8)

## 2024-02-08 PROCEDURE — 85007 BL SMEAR W/DIFF WBC COUNT: CPT | Performed by: PHYSICIAN ASSISTANT

## 2024-02-08 PROCEDURE — 85025 COMPLETE CBC W/AUTO DIFF WBC: CPT | Performed by: PHYSICIAN ASSISTANT

## 2024-02-08 PROCEDURE — 36415 COLL VENOUS BLD VENIPUNCTURE: CPT | Performed by: PHYSICIAN ASSISTANT

## 2024-02-08 PROCEDURE — 80053 COMPREHEN METABOLIC PANEL: CPT | Performed by: PHYSICIAN ASSISTANT

## 2024-02-08 PROCEDURE — 82948 REAGENT STRIP/BLOOD GLUCOSE: CPT

## 2024-02-08 PROCEDURE — 86140 C-REACTIVE PROTEIN: CPT | Performed by: PHYSICIAN ASSISTANT

## 2024-02-15 ENCOUNTER — HOSPITAL ENCOUNTER (OUTPATIENT)
Dept: WOUND CARE | Facility: HOSPITAL | Age: 49
Discharge: HOME | End: 2024-02-15
Payer: MEDICARE

## 2024-02-15 VITALS
SYSTOLIC BLOOD PRESSURE: 96 MMHG | WEIGHT: 296 LBS | HEART RATE: 78 BPM | DIASTOLIC BLOOD PRESSURE: 56 MMHG | BODY MASS INDEX: 41.44 KG/M2 | HEIGHT: 71 IN | RESPIRATION RATE: 20 BRPM | TEMPERATURE: 98.5 F

## 2024-02-15 DIAGNOSIS — L89.002 PRESSURE INJURY OF ELBOW, STAGE 2, UNSPECIFIED LATERALITY: Primary | ICD-10-CM

## 2024-02-15 PROCEDURE — 63710000001 CLOTRIMAZOLE 1 % CREAM 30 G TUBE: Performed by: NURSE PRACTITIONER

## 2024-02-15 PROCEDURE — A9270 NON-COVERED ITEM OR SERVICE: HCPCS | Performed by: NURSE PRACTITIONER

## 2024-02-15 PROCEDURE — 63710000001 COLLAGENASE 250 UNIT/GM OINTMENT 30 G TUBE: Performed by: NURSE PRACTITIONER

## 2024-02-15 PROCEDURE — 63710000001 ZINC OXIDE 20 % OINTMENT 454 G JAR: Performed by: NURSE PRACTITIONER

## 2024-02-15 PROCEDURE — 63710000001 A&D OINTMENT 425 G JAR: Performed by: NURSE PRACTITIONER

## 2024-02-15 PROCEDURE — 63710000001 ALUMINUM-MAGNESIUM HYDROXIDE-SIMETHICONE 200-200-20 MG/5ML SUSPENSION: Performed by: NURSE PRACTITIONER

## 2024-02-15 RX ORDER — DIAPER,BRIEF,INFANT-TODD,DISP
1 EACH MISCELLANEOUS ONCE
OUTPATIENT
Start: 2024-02-15 | End: 2024-02-15

## 2024-02-15 RX ORDER — LIDOCAINE HYDROCHLORIDE 20 MG/ML
6 JELLY TOPICAL ONCE
Status: COMPLETED | OUTPATIENT
Start: 2024-02-15 | End: 2024-02-15

## 2024-02-15 RX ORDER — SODIUM HYPOCHLORITE 2.5 MG/ML
1 SOLUTION TOPICAL AS NEEDED
Status: DISCONTINUED | OUTPATIENT
Start: 2024-02-15 | End: 2024-02-16 | Stop reason: HOSPADM

## 2024-02-15 RX ORDER — SODIUM HYPOCHLORITE 2.5 MG/ML
1 SOLUTION TOPICAL AS NEEDED
Status: CANCELLED | OUTPATIENT
Start: 2024-02-15

## 2024-02-15 RX ORDER — LIDOCAINE HYDROCHLORIDE 20 MG/ML
6 JELLY TOPICAL ONCE
Status: CANCELLED | OUTPATIENT
Start: 2024-02-15 | End: 2024-02-15

## 2024-02-15 RX ORDER — LIDOCAINE HYDROCHLORIDE 20 MG/ML
JELLY TOPICAL AS NEEDED
OUTPATIENT
Start: 2024-02-15

## 2024-02-15 RX ORDER — SODIUM HYPOCHLORITE 1.25 MG/ML
1 SOLUTION TOPICAL AS NEEDED
OUTPATIENT
Start: 2024-02-15

## 2024-02-15 RX ORDER — CASTOR OIL AND BALSAM, PERU 788; 87 MG/G; MG/G
1 OINTMENT TOPICAL AS NEEDED
OUTPATIENT
Start: 2024-02-15

## 2024-02-15 RX ORDER — LIDOCAINE HYDROCHLORIDE AND EPINEPHRINE BITARTRATE 20; .01 MG/ML; MG/ML
10 INJECTION, SOLUTION SUBCUTANEOUS ONCE
OUTPATIENT
Start: 2024-02-15 | End: 2024-02-15

## 2024-02-15 RX ORDER — LIDOCAINE HYDROCHLORIDE 20 MG/ML
10 INJECTION, SOLUTION INFILTRATION; PERINEURAL ONCE
OUTPATIENT
Start: 2024-02-15 | End: 2024-02-15

## 2024-02-15 RX ADMIN — COLLAGENASE SANTYL 1 APPLICATION: 250 OINTMENT TOPICAL at 13:58

## 2024-02-15 RX ADMIN — LIDOCAINE HYDROCHLORIDE 6 ML: 20 JELLY TOPICAL at 13:58

## 2024-02-15 RX ADMIN — VITAMINS A AND D OINTMENT 1 APPLICATION: 15.5; 53.4 OINTMENT TOPICAL at 13:58

## 2024-02-20 RX ORDER — SPIRONOLACTONE 25 MG/1
TABLET ORAL
Qty: 30 TABLET | Refills: 0 | Status: SHIPPED | OUTPATIENT
Start: 2024-02-20

## 2024-02-20 RX ORDER — BUMETANIDE 2 MG/1
2 TABLET ORAL DAILY
Qty: 30 TABLET | Refills: 2 | Status: SHIPPED | OUTPATIENT
Start: 2024-02-20 | End: 2024-05-20

## 2024-02-22 ENCOUNTER — HOSPITAL ENCOUNTER (OUTPATIENT)
Dept: WOUND CARE | Facility: HOSPITAL | Age: 49
Discharge: HOME | End: 2024-02-22
Payer: MEDICARE

## 2024-02-22 VITALS
WEIGHT: 296 LBS | DIASTOLIC BLOOD PRESSURE: 61 MMHG | TEMPERATURE: 98 F | RESPIRATION RATE: 18 BRPM | BODY MASS INDEX: 41.44 KG/M2 | SYSTOLIC BLOOD PRESSURE: 92 MMHG | HEIGHT: 71 IN | HEART RATE: 87 BPM

## 2024-02-22 DIAGNOSIS — L89.002 PRESSURE INJURY OF ELBOW, STAGE 2, UNSPECIFIED LATERALITY: Primary | ICD-10-CM

## 2024-02-22 LAB
ALBUMIN SERPL-MCNC: 3.2 G/DL (ref 3.5–5.2)
ALBUMIN/GLOB SERPL: 0.7 G/DL
ALP SERPL-CCNC: 193 U/L (ref 39–117)
ALT SERPL W P-5'-P-CCNC: 25 U/L (ref 1–41)
ANION GAP SERPL CALCULATED.3IONS-SCNC: 12.2 MMOL/L (ref 5–15)
ANISOCYTOSIS BLD QL: NORMAL
AST SERPL-CCNC: 23 U/L (ref 1–40)
BASOPHILS # BLD AUTO: 0.03 10*3/MM3 (ref 0–0.2)
BASOPHILS NFR BLD AUTO: 0.2 % (ref 0–1.5)
BILIRUB SERPL-MCNC: 0.4 MG/DL (ref 0–1.2)
BUN SERPL-MCNC: 36 MG/DL (ref 6–20)
BUN/CREAT SERPL: 33.6 (ref 7–25)
CALCIUM SPEC-SCNC: 9 MG/DL (ref 8.6–10.5)
CHLORIDE SERPL-SCNC: 99 MMOL/L (ref 98–107)
CO2 SERPL-SCNC: 24.8 MMOL/L (ref 22–29)
CREAT SERPL-MCNC: 1.07 MG/DL (ref 0.76–1.27)
CRP SERPL-MCNC: 15.51 MG/DL (ref 0–0.5)
DEPRECATED RDW RBC AUTO: 64.1 FL (ref 37–54)
EGFRCR SERPLBLD CKD-EPI 2021: 86.7 ML/MIN/1.73
EOSINOPHIL # BLD AUTO: 0.14 10*3/MM3 (ref 0–0.4)
EOSINOPHIL NFR BLD AUTO: 1 % (ref 0.3–6.2)
ERYTHROCYTE [DISTWIDTH] IN BLOOD BY AUTOMATED COUNT: 19.9 % (ref 12.3–15.4)
ERYTHROCYTE [SEDIMENTATION RATE] IN BLOOD: >130 MM/HR (ref 0–15)
GLOBULIN UR ELPH-MCNC: 4.3 GM/DL
GLUCOSE SERPL-MCNC: 143 MG/DL (ref 65–99)
HCT VFR BLD AUTO: 34.7 % (ref 37.5–51)
HGB BLD-MCNC: 9.8 G/DL (ref 13–17.7)
HYPOCHROMIA BLD QL: NORMAL
IMM GRANULOCYTES # BLD AUTO: 0.05 10*3/MM3 (ref 0–0.05)
IMM GRANULOCYTES NFR BLD AUTO: 0.4 % (ref 0–0.5)
LYMPHOCYTES # BLD AUTO: 0.99 10*3/MM3 (ref 0.7–3.1)
LYMPHOCYTES NFR BLD AUTO: 7 % (ref 19.6–45.3)
MCH RBC QN AUTO: 24.8 PG (ref 26.6–33)
MCHC RBC AUTO-ENTMCNC: 28.2 G/DL (ref 31.5–35.7)
MCV RBC AUTO: 87.8 FL (ref 79–97)
MONOCYTES # BLD AUTO: 0.52 10*3/MM3 (ref 0.1–0.9)
MONOCYTES NFR BLD AUTO: 3.7 % (ref 5–12)
NEUTROPHILS NFR BLD AUTO: 12.49 10*3/MM3 (ref 1.7–7)
NEUTROPHILS NFR BLD AUTO: 87.7 % (ref 42.7–76)
NRBC BLD AUTO-RTO: 0 /100 WBC (ref 0–0.2)
PLAT MORPH BLD: NORMAL
PLATELET # BLD AUTO: 488 10*3/MM3 (ref 140–450)
PMV BLD AUTO: 8.9 FL (ref 6–12)
POIKILOCYTOSIS BLD QL SMEAR: NORMAL
POTASSIUM SERPL-SCNC: 4.5 MMOL/L (ref 3.5–5.2)
PROT SERPL-MCNC: 7.5 G/DL (ref 6–8.5)
RBC # BLD AUTO: 3.95 10*6/MM3 (ref 4.14–5.8)
SODIUM SERPL-SCNC: 136 MMOL/L (ref 136–145)
WBC NRBC COR # BLD AUTO: 14.22 10*3/MM3 (ref 3.4–10.8)

## 2024-02-22 PROCEDURE — A9270 NON-COVERED ITEM OR SERVICE: HCPCS | Performed by: NURSE PRACTITIONER

## 2024-02-22 PROCEDURE — 63710000001 ALUMINUM-MAGNESIUM HYDROXIDE-SIMETHICONE 200-200-20 MG/5ML SUSPENSION: Performed by: NURSE PRACTITIONER

## 2024-02-22 PROCEDURE — 85025 COMPLETE CBC W/AUTO DIFF WBC: CPT | Performed by: NURSE PRACTITIONER

## 2024-02-22 PROCEDURE — 63710000001 ZINC OXIDE 20 % OINTMENT: Performed by: NURSE PRACTITIONER

## 2024-02-22 PROCEDURE — 63710000001 CLOTRIMAZOLE 1 % CREAM: Performed by: NURSE PRACTITIONER

## 2024-02-22 PROCEDURE — 87077 CULTURE AEROBIC IDENTIFY: CPT | Performed by: NURSE PRACTITIONER

## 2024-02-22 PROCEDURE — 63710000001 SODIUM HYPOCHLORITE 0.25 % SOLUTION 473 ML BOTTLE: Performed by: NURSE PRACTITIONER

## 2024-02-22 PROCEDURE — 87186 SC STD MICRODIL/AGAR DIL: CPT | Performed by: NURSE PRACTITIONER

## 2024-02-22 PROCEDURE — 87070 CULTURE OTHR SPECIMN AEROBIC: CPT | Performed by: NURSE PRACTITIONER

## 2024-02-22 PROCEDURE — 84134 ASSAY OF PREALBUMIN: CPT | Performed by: NURSE PRACTITIONER

## 2024-02-22 PROCEDURE — 63710000001 A+D PREVENT OINTMENT: Performed by: NURSE PRACTITIONER

## 2024-02-22 PROCEDURE — 36415 COLL VENOUS BLD VENIPUNCTURE: CPT

## 2024-02-22 PROCEDURE — 85652 RBC SED RATE AUTOMATED: CPT | Performed by: NURSE PRACTITIONER

## 2024-02-22 PROCEDURE — 85007 BL SMEAR W/DIFF WBC COUNT: CPT | Performed by: NURSE PRACTITIONER

## 2024-02-22 PROCEDURE — 86140 C-REACTIVE PROTEIN: CPT | Performed by: NURSE PRACTITIONER

## 2024-02-22 PROCEDURE — 87205 SMEAR GRAM STAIN: CPT | Performed by: NURSE PRACTITIONER

## 2024-02-22 PROCEDURE — 80053 COMPREHEN METABOLIC PANEL: CPT | Performed by: NURSE PRACTITIONER

## 2024-02-22 PROCEDURE — 63710000001 COLLAGENASE 250 UNIT/GM OINTMENT 30 G TUBE: Performed by: NURSE PRACTITIONER

## 2024-02-22 RX ORDER — LIDOCAINE HYDROCHLORIDE 20 MG/ML
10 INJECTION, SOLUTION INFILTRATION; PERINEURAL ONCE
Status: CANCELLED | OUTPATIENT
Start: 2024-02-22 | End: 2024-02-22

## 2024-02-22 RX ORDER — SODIUM HYPOCHLORITE 2.5 MG/ML
1 SOLUTION TOPICAL AS NEEDED
Status: DISCONTINUED | OUTPATIENT
Start: 2024-02-22 | End: 2024-02-23 | Stop reason: HOSPADM

## 2024-02-22 RX ORDER — SODIUM HYPOCHLORITE 1.25 MG/ML
1 SOLUTION TOPICAL AS NEEDED
Status: CANCELLED | OUTPATIENT
Start: 2024-02-22

## 2024-02-22 RX ORDER — CASTOR OIL AND BALSAM, PERU 788; 87 MG/G; MG/G
1 OINTMENT TOPICAL AS NEEDED
Status: CANCELLED | OUTPATIENT
Start: 2024-02-22

## 2024-02-22 RX ORDER — SODIUM HYPOCHLORITE 2.5 MG/ML
1 SOLUTION TOPICAL AS NEEDED
Status: CANCELLED | OUTPATIENT
Start: 2024-02-22

## 2024-02-22 RX ORDER — LIDOCAINE HYDROCHLORIDE 20 MG/ML
6 JELLY TOPICAL ONCE
Status: COMPLETED | OUTPATIENT
Start: 2024-02-22 | End: 2024-02-22

## 2024-02-22 RX ORDER — LIDOCAINE HYDROCHLORIDE AND EPINEPHRINE BITARTRATE 20; .01 MG/ML; MG/ML
10 INJECTION, SOLUTION SUBCUTANEOUS ONCE
Status: CANCELLED | OUTPATIENT
Start: 2024-02-22 | End: 2024-02-22

## 2024-02-22 RX ORDER — DIAPER,BRIEF,INFANT-TODD,DISP
1 EACH MISCELLANEOUS ONCE
Status: CANCELLED | OUTPATIENT
Start: 2024-02-22 | End: 2024-02-22

## 2024-02-22 RX ORDER — LIDOCAINE HYDROCHLORIDE 20 MG/ML
6 JELLY TOPICAL ONCE
Status: CANCELLED | OUTPATIENT
Start: 2024-02-22 | End: 2024-02-22

## 2024-02-22 RX ORDER — LIDOCAINE HYDROCHLORIDE 20 MG/ML
JELLY TOPICAL AS NEEDED
Status: CANCELLED | OUTPATIENT
Start: 2024-02-22

## 2024-02-22 RX ADMIN — COLLAGENASE SANTYL 1 APPLICATION: 250 OINTMENT TOPICAL at 14:47

## 2024-02-22 RX ADMIN — SODIUM HYPOCHLORITE 473 ML: 2.5 SOLUTION TOPICAL at 14:47

## 2024-02-22 RX ADMIN — LIDOCAINE HYDROCHLORIDE 6 ML: 20 JELLY TOPICAL at 14:47

## 2024-02-23 ENCOUNTER — HOSPITAL ENCOUNTER (EMERGENCY)
Facility: HOSPITAL | Age: 49
Discharge: HOME | End: 2024-02-23
Attending: STUDENT IN AN ORGANIZED HEALTH CARE EDUCATION/TRAINING PROGRAM
Payer: MEDICARE

## 2024-02-23 ENCOUNTER — APPOINTMENT (OUTPATIENT)
Dept: GENERAL RADIOLOGY | Facility: HOSPITAL | Age: 49
End: 2024-02-23
Payer: MEDICARE

## 2024-02-23 ENCOUNTER — APPOINTMENT (OUTPATIENT)
Dept: CT IMAGING | Facility: HOSPITAL | Age: 49
End: 2024-02-23
Payer: MEDICARE

## 2024-02-23 ENCOUNTER — TELEPHONE (OUTPATIENT)
Dept: WOUND CARE | Facility: HOSPITAL | Age: 49
End: 2024-02-23
Payer: MEDICARE

## 2024-02-23 VITALS
OXYGEN SATURATION: 98 % | BODY MASS INDEX: 41.44 KG/M2 | SYSTOLIC BLOOD PRESSURE: 101 MMHG | HEART RATE: 87 BPM | DIASTOLIC BLOOD PRESSURE: 65 MMHG | WEIGHT: 296 LBS | HEIGHT: 71 IN | RESPIRATION RATE: 17 BRPM | TEMPERATURE: 97.9 F

## 2024-02-23 DIAGNOSIS — L89.159 PRESSURE INJURY OF SKIN OF SACRAL REGION, UNSPECIFIED INJURY STAGE: Primary | ICD-10-CM

## 2024-02-23 DIAGNOSIS — N30.01 ACUTE CYSTITIS WITH HEMATURIA: ICD-10-CM

## 2024-02-23 LAB
A-A DO2: 37 MMHG (ref 0–300)
ALBUMIN SERPL-MCNC: 2.9 G/DL (ref 3.5–5.2)
ALBUMIN/GLOB SERPL: 0.6 G/DL
ALP SERPL-CCNC: 175 U/L (ref 39–117)
ALT SERPL W P-5'-P-CCNC: 24 U/L (ref 1–41)
ANION GAP SERPL CALCULATED.3IONS-SCNC: 14.7 MMOL/L (ref 5–15)
ANISOCYTOSIS BLD QL: NORMAL
ARTERIAL PATENCY WRIST A: POSITIVE
AST SERPL-CCNC: 17 U/L (ref 1–40)
ATMOSPHERIC PRESS: 718 MMHG
BACTERIA UR QL AUTO: ABNORMAL /HPF
BASE EXCESS BLDA CALC-SCNC: -4 MMOL/L (ref 0–2)
BASOPHILS # BLD AUTO: 0.02 10*3/MM3 (ref 0–0.2)
BASOPHILS NFR BLD AUTO: 0.1 % (ref 0–1.5)
BDY SITE: ABNORMAL
BILIRUB SERPL-MCNC: 0.3 MG/DL (ref 0–1.2)
BILIRUB UR QL STRIP: NEGATIVE
BUN SERPL-MCNC: 35 MG/DL (ref 6–20)
BUN/CREAT SERPL: 31.8 (ref 7–25)
CALCIUM SPEC-SCNC: 8.9 MG/DL (ref 8.6–10.5)
CHLORIDE SERPL-SCNC: 102 MMOL/L (ref 98–107)
CLARITY UR: ABNORMAL
CO2 BLDA-SCNC: 22.9 MMOL/L (ref 22–33)
CO2 SERPL-SCNC: 21.3 MMOL/L (ref 22–29)
COHGB MFR BLD: 1.3 % (ref 0–5)
COLOR UR: YELLOW
CREAT SERPL-MCNC: 1.1 MG/DL (ref 0.76–1.27)
CRP SERPL-MCNC: 17.12 MG/DL (ref 0–0.5)
D-LACTATE SERPL-SCNC: 1.5 MMOL/L (ref 0.5–2)
DEPRECATED RDW RBC AUTO: 63.6 FL (ref 37–54)
EGFRCR SERPLBLD CKD-EPI 2021: 83.8 ML/MIN/1.73
EOSINOPHIL # BLD AUTO: 0.25 10*3/MM3 (ref 0–0.4)
EOSINOPHIL NFR BLD AUTO: 1.9 % (ref 0.3–6.2)
ERYTHROCYTE [DISTWIDTH] IN BLOOD BY AUTOMATED COUNT: 19.8 % (ref 12.3–15.4)
FLUAV RNA RESP QL NAA+PROBE: NOT DETECTED
FLUBV RNA RESP QL NAA+PROBE: NOT DETECTED
GAS FLOW AIRWAY: 2 LPM
GLOBULIN UR ELPH-MCNC: 4.5 GM/DL
GLUCOSE BLDC GLUCOMTR-MCNC: 123 MG/DL (ref 70–130)
GLUCOSE BLDC GLUCOMTR-MCNC: 85 MG/DL (ref 70–130)
GLUCOSE SERPL-MCNC: 146 MG/DL (ref 65–99)
GLUCOSE UR STRIP-MCNC: NEGATIVE MG/DL
HCO3 BLDA-SCNC: 21.6 MMOL/L (ref 20–26)
HCT VFR BLD AUTO: 32.5 % (ref 37.5–51)
HCT VFR BLD CALC: 28.5 % (ref 38–51)
HGB BLD-MCNC: 9.3 G/DL (ref 13–17.7)
HGB BLDA-MCNC: 9.3 G/DL (ref 14–18)
HGB UR QL STRIP.AUTO: ABNORMAL
HOLD SPECIMEN: NORMAL
HYALINE CASTS UR QL AUTO: ABNORMAL /LPF
HYPOCHROMIA BLD QL: NORMAL
IMM GRANULOCYTES # BLD AUTO: 0.06 10*3/MM3 (ref 0–0.05)
IMM GRANULOCYTES NFR BLD AUTO: 0.4 % (ref 0–0.5)
INHALED O2 CONCENTRATION: 28 %
KETONES UR QL STRIP: NEGATIVE
LEUKOCYTE ESTERASE UR QL STRIP.AUTO: ABNORMAL
LYMPHOCYTES # BLD AUTO: 1.27 10*3/MM3 (ref 0.7–3.1)
LYMPHOCYTES NFR BLD AUTO: 9.5 % (ref 19.6–45.3)
Lab: ABNORMAL
MCH RBC QN AUTO: 25.1 PG (ref 26.6–33)
MCHC RBC AUTO-ENTMCNC: 28.6 G/DL (ref 31.5–35.7)
MCV RBC AUTO: 87.6 FL (ref 79–97)
METHGB BLD QL: 0.2 % (ref 0–3)
MODALITY: ABNORMAL
MONOCYTES # BLD AUTO: 0.54 10*3/MM3 (ref 0.1–0.9)
MONOCYTES NFR BLD AUTO: 4 % (ref 5–12)
NEUTROPHILS NFR BLD AUTO: 11.28 10*3/MM3 (ref 1.7–7)
NEUTROPHILS NFR BLD AUTO: 84.1 % (ref 42.7–76)
NITRITE UR QL STRIP: NEGATIVE
NRBC BLD AUTO-RTO: 0 /100 WBC (ref 0–0.2)
OXYHGB MFR BLDV: 96.4 % (ref 94–99)
PCO2 BLDA: 41 MM HG (ref 35–45)
PCO2 TEMP ADJ BLD: ABNORMAL MM[HG]
PH BLDA: 7.33 PH UNITS (ref 7.35–7.45)
PH UR STRIP.AUTO: 6.5 [PH] (ref 5–8)
PH, TEMP CORRECTED: ABNORMAL
PLATELET # BLD AUTO: 473 10*3/MM3 (ref 140–450)
PMV BLD AUTO: 9.7 FL (ref 6–12)
PO2 BLDA: 105 MM HG (ref 83–108)
PO2 TEMP ADJ BLD: ABNORMAL MM[HG]
POIKILOCYTOSIS BLD QL SMEAR: NORMAL
POTASSIUM SERPL-SCNC: 4.2 MMOL/L (ref 3.5–5.2)
PREALB SERPL-MCNC: 13 MG/DL (ref 20–40)
PROT SERPL-MCNC: 7.4 G/DL (ref 6–8.5)
PROT UR QL STRIP: ABNORMAL
RBC # BLD AUTO: 3.71 10*6/MM3 (ref 4.14–5.8)
RBC # UR STRIP: ABNORMAL /HPF
REF LAB TEST METHOD: ABNORMAL
SAO2 % BLDCOA: 97.9 % (ref 94–99)
SARS-COV-2 RNA RESP QL NAA+PROBE: NOT DETECTED
SMALL PLATELETS BLD QL SMEAR: NORMAL
SODIUM SERPL-SCNC: 138 MMOL/L (ref 136–145)
SP GR UR STRIP: 1.01 (ref 1–1.03)
SQUAMOUS #/AREA URNS HPF: ABNORMAL /HPF
UROBILINOGEN UR QL STRIP: ABNORMAL
VENTILATOR MODE: ABNORMAL
WBC # UR STRIP: ABNORMAL /HPF
WBC NRBC COR # BLD AUTO: 13.42 10*3/MM3 (ref 3.4–10.8)
WHOLE BLOOD HOLD COAG: NORMAL
WHOLE BLOOD HOLD SPECIMEN: NORMAL
YEAST URNS QL MICRO: ABNORMAL /HPF

## 2024-02-23 PROCEDURE — 87077 CULTURE AEROBIC IDENTIFY: CPT | Performed by: PHYSICIAN ASSISTANT

## 2024-02-23 PROCEDURE — 99285 EMERGENCY DEPT VISIT HI MDM: CPT

## 2024-02-23 PROCEDURE — 86140 C-REACTIVE PROTEIN: CPT | Performed by: PHYSICIAN ASSISTANT

## 2024-02-23 PROCEDURE — 94799 UNLISTED PULMONARY SVC/PX: CPT

## 2024-02-23 PROCEDURE — 25810000003 SEPSIS FLUID NS 0.9 % SOLUTION: Performed by: PHYSICIAN ASSISTANT

## 2024-02-23 PROCEDURE — 81001 URINALYSIS AUTO W/SCOPE: CPT | Performed by: PHYSICIAN ASSISTANT

## 2024-02-23 PROCEDURE — 96366 THER/PROPH/DIAG IV INF ADDON: CPT

## 2024-02-23 PROCEDURE — 71045 X-RAY EXAM CHEST 1 VIEW: CPT

## 2024-02-23 PROCEDURE — 85025 COMPLETE CBC W/AUTO DIFF WBC: CPT | Performed by: PHYSICIAN ASSISTANT

## 2024-02-23 PROCEDURE — 87086 URINE CULTURE/COLONY COUNT: CPT | Performed by: PHYSICIAN ASSISTANT

## 2024-02-23 PROCEDURE — 25010000002 PIPERACILLIN SOD-TAZOBACTAM PER 1 G: Performed by: PHYSICIAN ASSISTANT

## 2024-02-23 PROCEDURE — 85007 BL SMEAR W/DIFF WBC COUNT: CPT | Performed by: PHYSICIAN ASSISTANT

## 2024-02-23 PROCEDURE — 96365 THER/PROPH/DIAG IV INF INIT: CPT

## 2024-02-23 PROCEDURE — 74177 CT ABD & PELVIS W/CONTRAST: CPT | Performed by: RADIOLOGY

## 2024-02-23 PROCEDURE — 96367 TX/PROPH/DG ADDL SEQ IV INF: CPT

## 2024-02-23 PROCEDURE — 83605 ASSAY OF LACTIC ACID: CPT | Performed by: PHYSICIAN ASSISTANT

## 2024-02-23 PROCEDURE — 82805 BLOOD GASES W/O2 SATURATION: CPT

## 2024-02-23 PROCEDURE — P9612 CATHETERIZE FOR URINE SPEC: HCPCS

## 2024-02-23 PROCEDURE — 36600 WITHDRAWAL OF ARTERIAL BLOOD: CPT

## 2024-02-23 PROCEDURE — 94761 N-INVAS EAR/PLS OXIMETRY MLT: CPT

## 2024-02-23 PROCEDURE — 80053 COMPREHEN METABOLIC PANEL: CPT | Performed by: PHYSICIAN ASSISTANT

## 2024-02-23 PROCEDURE — 82948 REAGENT STRIP/BLOOD GLUCOSE: CPT

## 2024-02-23 PROCEDURE — 25810000003 SODIUM CHLORIDE 0.9 % SOLUTION: Performed by: STUDENT IN AN ORGANIZED HEALTH CARE EDUCATION/TRAINING PROGRAM

## 2024-02-23 PROCEDURE — 36415 COLL VENOUS BLD VENIPUNCTURE: CPT

## 2024-02-23 PROCEDURE — 87186 SC STD MICRODIL/AGAR DIL: CPT | Performed by: PHYSICIAN ASSISTANT

## 2024-02-23 PROCEDURE — 25810000003 SODIUM CHLORIDE 0.9 % SOLUTION: Performed by: PHYSICIAN ASSISTANT

## 2024-02-23 PROCEDURE — 25510000001 IOPAMIDOL PER 1 ML: Performed by: STUDENT IN AN ORGANIZED HEALTH CARE EDUCATION/TRAINING PROGRAM

## 2024-02-23 PROCEDURE — 74177 CT ABD & PELVIS W/CONTRAST: CPT

## 2024-02-23 PROCEDURE — 25010000002 VANCOMYCIN 5 G RECONSTITUTED SOLUTION: Performed by: PHYSICIAN ASSISTANT

## 2024-02-23 PROCEDURE — 71045 X-RAY EXAM CHEST 1 VIEW: CPT | Performed by: RADIOLOGY

## 2024-02-23 PROCEDURE — 74175 CTA ABDOMEN W/CONTRAST: CPT

## 2024-02-23 PROCEDURE — 87636 SARSCOV2 & INF A&B AMP PRB: CPT | Performed by: PHYSICIAN ASSISTANT

## 2024-02-23 PROCEDURE — 87040 BLOOD CULTURE FOR BACTERIA: CPT | Performed by: PHYSICIAN ASSISTANT

## 2024-02-23 PROCEDURE — 82375 ASSAY CARBOXYHB QUANT: CPT

## 2024-02-23 PROCEDURE — 83050 HGB METHEMOGLOBIN QUAN: CPT

## 2024-02-23 PROCEDURE — 25510000001 IOPAMIDOL 61 % SOLUTION: Performed by: STUDENT IN AN ORGANIZED HEALTH CARE EDUCATION/TRAINING PROGRAM

## 2024-02-23 RX ORDER — AMOXICILLIN AND CLAVULANATE POTASSIUM 875; 125 MG/1; MG/1
1 TABLET, FILM COATED ORAL EVERY 12 HOURS
Qty: 14 TABLET | Refills: 0 | Status: SHIPPED | OUTPATIENT
Start: 2024-02-23 | End: 2024-02-24 | Stop reason: SDUPTHER

## 2024-02-23 RX ORDER — SODIUM CHLORIDE 0.9 % (FLUSH) 0.9 %
10 SYRINGE (ML) INJECTION AS NEEDED
Status: DISCONTINUED | OUTPATIENT
Start: 2024-02-23 | End: 2024-02-23 | Stop reason: HOSPADM

## 2024-02-23 RX ADMIN — IOPAMIDOL 84 ML: 612 INJECTION, SOLUTION INTRAVENOUS at 17:08

## 2024-02-23 RX ADMIN — IOPAMIDOL 70 ML: 755 INJECTION, SOLUTION INTRAVENOUS at 20:07

## 2024-02-23 RX ADMIN — VANCOMYCIN HYDROCHLORIDE 2500 MG: 5 INJECTION, POWDER, LYOPHILIZED, FOR SOLUTION INTRAVENOUS at 17:51

## 2024-02-23 RX ADMIN — SODIUM CHLORIDE 2964 ML: 9 INJECTION, SOLUTION INTRAVENOUS at 14:50

## 2024-02-23 RX ADMIN — PIPERACILLIN SODIUM AND TAZOBACTAM SODIUM 3.38 G: 3; .375 INJECTION, POWDER, LYOPHILIZED, FOR SOLUTION INTRAVENOUS at 16:30

## 2024-02-23 RX ADMIN — SODIUM CHLORIDE 1000 ML: 9 INJECTION, SOLUTION INTRAVENOUS at 14:42
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
## 2024-02-24 RX ORDER — AMOXICILLIN AND CLAVULANATE POTASSIUM 875; 125 MG/1; MG/1
1 TABLET, FILM COATED ORAL EVERY 12 HOURS
Qty: 14 TABLET | Refills: 0 | Status: SHIPPED | OUTPATIENT
Start: 2024-02-24 | End: 2024-03-02
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
## 2024-02-26 LAB
BACTERIA SPEC AEROBE CULT: ABNORMAL
GRAM STN SPEC: ABNORMAL
GRAM STN SPEC: ABNORMAL

## 2024-02-27 LAB
BACTERIA SPEC AEROBE CULT: ABNORMAL
BACTERIA SPEC AEROBE CULT: ABNORMAL

## 2024-02-28 LAB
BACTERIA SPEC AEROBE CULT: NORMAL
BACTERIA SPEC AEROBE CULT: NORMAL

## 2024-02-29 ENCOUNTER — HOSPITAL ENCOUNTER (OUTPATIENT)
Dept: WOUND CARE | Facility: HOSPITAL | Age: 49
Discharge: HOME | End: 2024-02-29
Payer: MEDICARE

## 2024-02-29 VITALS
HEIGHT: 71 IN | HEART RATE: 74 BPM | TEMPERATURE: 98 F | SYSTOLIC BLOOD PRESSURE: 96 MMHG | WEIGHT: 296 LBS | DIASTOLIC BLOOD PRESSURE: 60 MMHG | RESPIRATION RATE: 18 BRPM | BODY MASS INDEX: 41.44 KG/M2

## 2024-02-29 DIAGNOSIS — L89.154 DECUBITUS ULCER OF SACRAL REGION, STAGE 4: Primary | ICD-10-CM

## 2024-02-29 DIAGNOSIS — L89.002 PRESSURE INJURY OF ELBOW, STAGE 2, UNSPECIFIED LATERALITY: Primary | ICD-10-CM

## 2024-02-29 LAB
ALBUMIN SERPL-MCNC: 3 G/DL (ref 3.5–5.2)
ALBUMIN/GLOB SERPL: 0.7 G/DL
ALP SERPL-CCNC: 155 U/L (ref 39–117)
ALT SERPL W P-5'-P-CCNC: 13 U/L (ref 1–41)
ANION GAP SERPL CALCULATED.3IONS-SCNC: 11.9 MMOL/L (ref 5–15)
AST SERPL-CCNC: 10 U/L (ref 1–40)
BASOPHILS # BLD AUTO: 0.02 10*3/MM3 (ref 0–0.2)
BASOPHILS NFR BLD AUTO: 0.1 % (ref 0–1.5)
BILIRUB SERPL-MCNC: 0.3 MG/DL (ref 0–1.2)
BUN SERPL-MCNC: 38 MG/DL (ref 6–20)
BUN/CREAT SERPL: 37.6 (ref 7–25)
CALCIUM SPEC-SCNC: 9.1 MG/DL (ref 8.6–10.5)
CHLORIDE SERPL-SCNC: 103 MMOL/L (ref 98–107)
CO2 SERPL-SCNC: 22.1 MMOL/L (ref 22–29)
CREAT SERPL-MCNC: 1.01 MG/DL (ref 0.76–1.27)
CRP SERPL-MCNC: 18.95 MG/DL (ref 0–0.5)
DEPRECATED RDW RBC AUTO: 61.9 FL (ref 37–54)
EGFRCR SERPLBLD CKD-EPI 2021: 92.9 ML/MIN/1.73
EOSINOPHIL # BLD AUTO: 0.17 10*3/MM3 (ref 0–0.4)
EOSINOPHIL NFR BLD AUTO: 1.1 % (ref 0.3–6.2)
ERYTHROCYTE [DISTWIDTH] IN BLOOD BY AUTOMATED COUNT: 19 % (ref 12.3–15.4)
ERYTHROCYTE [SEDIMENTATION RATE] IN BLOOD: 109 MM/HR (ref 0–15)
GLOBULIN UR ELPH-MCNC: 4.3 GM/DL
GLUCOSE SERPL-MCNC: 113 MG/DL (ref 65–99)
HCT VFR BLD AUTO: 32.2 % (ref 37.5–51)
HGB BLD-MCNC: 9.2 G/DL (ref 13–17.7)
IMM GRANULOCYTES # BLD AUTO: 0.07 10*3/MM3 (ref 0–0.05)
IMM GRANULOCYTES NFR BLD AUTO: 0.5 % (ref 0–0.5)
LYMPHOCYTES # BLD AUTO: 1.1 10*3/MM3 (ref 0.7–3.1)
LYMPHOCYTES NFR BLD AUTO: 7.4 % (ref 19.6–45.3)
MCH RBC QN AUTO: 25.3 PG (ref 26.6–33)
MCHC RBC AUTO-ENTMCNC: 28.6 G/DL (ref 31.5–35.7)
MCV RBC AUTO: 88.5 FL (ref 79–97)
MICROCYTES BLD QL: NORMAL
MONOCYTES # BLD AUTO: 0.44 10*3/MM3 (ref 0.1–0.9)
MONOCYTES NFR BLD AUTO: 3 % (ref 5–12)
NEUTROPHILS NFR BLD AUTO: 13.07 10*3/MM3 (ref 1.7–7)
NEUTROPHILS NFR BLD AUTO: 87.9 % (ref 42.7–76)
NRBC BLD AUTO-RTO: 0 /100 WBC (ref 0–0.2)
PLATELET # BLD AUTO: 454 10*3/MM3 (ref 140–450)
PMV BLD AUTO: 9.1 FL (ref 6–12)
POTASSIUM SERPL-SCNC: 5 MMOL/L (ref 3.5–5.2)
PROT SERPL-MCNC: 7.3 G/DL (ref 6–8.5)
RBC # BLD AUTO: 3.64 10*6/MM3 (ref 4.14–5.8)
SMALL PLATELETS BLD QL SMEAR: ADEQUATE
SODIUM SERPL-SCNC: 137 MMOL/L (ref 136–145)
WBC NRBC COR # BLD AUTO: 14.87 10*3/MM3 (ref 3.4–10.8)

## 2024-02-29 PROCEDURE — A9270 NON-COVERED ITEM OR SERVICE: HCPCS | Performed by: NURSE PRACTITIONER

## 2024-02-29 PROCEDURE — 97602 WOUND(S) CARE NON-SELECTIVE: CPT

## 2024-02-29 PROCEDURE — 63710000001 A+D PREVENT OINTMENT: Performed by: NURSE PRACTITIONER

## 2024-02-29 PROCEDURE — 85652 RBC SED RATE AUTOMATED: CPT | Performed by: NURSE PRACTITIONER

## 2024-02-29 PROCEDURE — 86140 C-REACTIVE PROTEIN: CPT | Performed by: NURSE PRACTITIONER

## 2024-02-29 PROCEDURE — 63710000001 ALUMINUM-MAGNESIUM HYDROXIDE-SIMETHICONE 200-200-20 MG/5ML SUSPENSION: Performed by: NURSE PRACTITIONER

## 2024-02-29 PROCEDURE — 36415 COLL VENOUS BLD VENIPUNCTURE: CPT

## 2024-02-29 PROCEDURE — 63710000001 ZINC OXIDE 20 % OINTMENT: Performed by: NURSE PRACTITIONER

## 2024-02-29 PROCEDURE — 63710000001 SODIUM HYPOCHLORITE 0.25 % SOLUTION 473 ML BOTTLE: Performed by: NURSE PRACTITIONER

## 2024-02-29 PROCEDURE — 85025 COMPLETE CBC W/AUTO DIFF WBC: CPT | Performed by: NURSE PRACTITIONER

## 2024-02-29 PROCEDURE — 80053 COMPREHEN METABOLIC PANEL: CPT | Performed by: NURSE PRACTITIONER

## 2024-02-29 PROCEDURE — 85007 BL SMEAR W/DIFF WBC COUNT: CPT | Performed by: NURSE PRACTITIONER

## 2024-02-29 PROCEDURE — 84134 ASSAY OF PREALBUMIN: CPT | Performed by: NURSE PRACTITIONER

## 2024-02-29 PROCEDURE — 63710000001 CLOTRIMAZOLE 1 % CREAM: Performed by: NURSE PRACTITIONER

## 2024-02-29 RX ORDER — SODIUM CHLORIDE 0.9 % (FLUSH) 0.9 %
10 SYRINGE (ML) INJECTION AS NEEDED
OUTPATIENT
Start: 2024-02-29

## 2024-02-29 RX ORDER — SODIUM HYPOCHLORITE 2.5 MG/ML
1 SOLUTION TOPICAL AS NEEDED
Status: DISCONTINUED | OUTPATIENT
Start: 2024-02-29 | End: 2024-03-01 | Stop reason: HOSPADM

## 2024-02-29 RX ORDER — LIDOCAINE HYDROCHLORIDE 20 MG/ML
6 JELLY TOPICAL ONCE
Status: CANCELLED | OUTPATIENT
Start: 2024-02-29 | End: 2024-02-29

## 2024-02-29 RX ORDER — SODIUM HYPOCHLORITE 1.25 MG/ML
1 SOLUTION TOPICAL AS NEEDED
Status: CANCELLED | OUTPATIENT
Start: 2024-02-29

## 2024-02-29 RX ORDER — LIDOCAINE HYDROCHLORIDE AND EPINEPHRINE BITARTRATE 20; .01 MG/ML; MG/ML
10 INJECTION, SOLUTION SUBCUTANEOUS ONCE
Status: CANCELLED | OUTPATIENT
Start: 2024-02-29 | End: 2024-02-29

## 2024-02-29 RX ORDER — LIDOCAINE HYDROCHLORIDE 20 MG/ML
10 INJECTION, SOLUTION INFILTRATION; PERINEURAL ONCE
Status: CANCELLED | OUTPATIENT
Start: 2024-02-29 | End: 2024-02-29

## 2024-02-29 RX ORDER — SODIUM CHLORIDE 0.9 % (FLUSH) 0.9 %
3 SYRINGE (ML) INJECTION EVERY 12 HOURS SCHEDULED
OUTPATIENT
Start: 2024-02-29

## 2024-02-29 RX ORDER — LIDOCAINE HYDROCHLORIDE 20 MG/ML
6 JELLY TOPICAL ONCE
Status: COMPLETED | OUTPATIENT
Start: 2024-02-29 | End: 2024-02-29

## 2024-02-29 RX ORDER — SODIUM HYPOCHLORITE 2.5 MG/ML
1 SOLUTION TOPICAL AS NEEDED
Status: CANCELLED | OUTPATIENT
Start: 2024-02-29

## 2024-02-29 RX ORDER — CASTOR OIL AND BALSAM, PERU 788; 87 MG/G; MG/G
1 OINTMENT TOPICAL AS NEEDED
Status: CANCELLED | OUTPATIENT
Start: 2024-02-29

## 2024-02-29 RX ORDER — DIAPER,BRIEF,INFANT-TODD,DISP
1 EACH MISCELLANEOUS ONCE
Status: CANCELLED | OUTPATIENT
Start: 2024-02-29 | End: 2024-02-29

## 2024-02-29 RX ORDER — SODIUM CHLORIDE 9 MG/ML
40 INJECTION, SOLUTION INTRAVENOUS AS NEEDED
OUTPATIENT
Start: 2024-02-29

## 2024-02-29 RX ORDER — LIDOCAINE HYDROCHLORIDE 20 MG/ML
JELLY TOPICAL AS NEEDED
Status: CANCELLED | OUTPATIENT
Start: 2024-02-29

## 2024-02-29 RX ADMIN — LIDOCAINE HYDROCHLORIDE 6 ML: 20 JELLY TOPICAL at 15:49

## 2024-02-29 RX ADMIN — SODIUM HYPOCHLORITE 473 ML: 2.5 SOLUTION TOPICAL at 15:50
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
## 2024-03-01 ENCOUNTER — ANESTHESIA EVENT (OUTPATIENT)
Dept: PERIOP | Facility: HOSPITAL | Age: 49
End: 2024-03-01

## 2024-03-01 ENCOUNTER — OFFICE VISIT (OUTPATIENT)
Dept: INFECTIOUS DISEASES | Facility: CLINIC | Age: 49
End: 2024-03-01
Payer: MEDICARE

## 2024-03-01 VITALS
BODY MASS INDEX: 41 KG/M2 | DIASTOLIC BLOOD PRESSURE: 61 MMHG | SYSTOLIC BLOOD PRESSURE: 88 MMHG | OXYGEN SATURATION: 100 % | WEIGHT: 294 LBS | HEART RATE: 68 BPM

## 2024-03-01 DIAGNOSIS — M86.159 ACUTE OSTEOMYELITIS OF PELVIC REGION, UNSPECIFIED LATERALITY: ICD-10-CM

## 2024-03-01 DIAGNOSIS — M46.28 OSTEOMYELITIS OF VERTEBRA, SACRAL AND SACROCOCCYGEAL REGION: ICD-10-CM

## 2024-03-01 DIAGNOSIS — L89.154 DECUBITUS ULCER OF SACRAL REGION, STAGE 4: Primary | ICD-10-CM

## 2024-03-01 LAB — PREALB SERPL-MCNC: 11.4 MG/DL (ref 20–40)

## 2024-03-01 RX ORDER — DIAPER,BRIEF,INFANT-TODD,DISP
1 EACH MISCELLANEOUS ONCE
Status: CANCELLED | OUTPATIENT
Start: 2024-03-01 | End: 2024-03-01

## 2024-03-01 RX ORDER — LIDOCAINE HYDROCHLORIDE 20 MG/ML
10 INJECTION, SOLUTION INFILTRATION; PERINEURAL ONCE
Status: CANCELLED | OUTPATIENT
Start: 2024-03-01 | End: 2024-03-01

## 2024-03-01 RX ORDER — SODIUM HYPOCHLORITE 1.25 MG/ML
1 SOLUTION TOPICAL AS NEEDED
Status: CANCELLED | OUTPATIENT
Start: 2024-03-01

## 2024-03-01 RX ORDER — CASTOR OIL AND BALSAM, PERU 788; 87 MG/G; MG/G
1 OINTMENT TOPICAL AS NEEDED
Status: CANCELLED | OUTPATIENT
Start: 2024-03-01

## 2024-03-01 RX ORDER — LIDOCAINE HYDROCHLORIDE 20 MG/ML
JELLY TOPICAL AS NEEDED
Status: CANCELLED | OUTPATIENT
Start: 2024-03-01

## 2024-03-01 RX ORDER — LIDOCAINE HYDROCHLORIDE AND EPINEPHRINE BITARTRATE 20; .01 MG/ML; MG/ML
10 INJECTION, SOLUTION SUBCUTANEOUS ONCE
Status: CANCELLED | OUTPATIENT
Start: 2024-03-01 | End: 2024-03-01

## 2024-03-01 RX ORDER — SODIUM HYPOCHLORITE 2.5 MG/ML
1 SOLUTION TOPICAL AS NEEDED
Status: CANCELLED | OUTPATIENT
Start: 2024-03-01

## 2024-03-01 RX ORDER — LIDOCAINE HYDROCHLORIDE 20 MG/ML
6 JELLY TOPICAL ONCE
Status: CANCELLED | OUTPATIENT
Start: 2024-03-01 | End: 2024-03-01

## 2024-03-01 RX ORDER — DEXTROSE MONOHYDRATE 50 MG/ML
20 INJECTION, SOLUTION INTRAVENOUS ONCE
OUTPATIENT
Start: 2024-03-01
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
## 2024-03-02 ENCOUNTER — APPOINTMENT (OUTPATIENT)
Dept: CT IMAGING | Facility: HOSPITAL | Age: 49
End: 2024-03-02
Payer: MEDICARE

## 2024-03-02 ENCOUNTER — APPOINTMENT (OUTPATIENT)
Dept: GENERAL RADIOLOGY | Facility: HOSPITAL | Age: 49
End: 2024-03-02
Payer: MEDICARE

## 2024-03-02 ENCOUNTER — HOSPITAL ENCOUNTER (EMERGENCY)
Facility: HOSPITAL | Age: 49
Discharge: ANOTHER HOSPITAL | End: 2024-03-03
Attending: EMERGENCY MEDICINE | Admitting: EMERGENCY MEDICINE
Payer: MEDICARE

## 2024-03-02 DIAGNOSIS — R65.21 SEPTIC SHOCK: Primary | ICD-10-CM

## 2024-03-02 DIAGNOSIS — A41.9 SEPTIC SHOCK: Primary | ICD-10-CM

## 2024-03-02 LAB
A-A DO2: 191.7 MMHG (ref 0–300)
ALBUMIN SERPL-MCNC: 2.6 G/DL (ref 3.5–5.2)
ALBUMIN/GLOB SERPL: 0.6 G/DL
ALP SERPL-CCNC: 144 U/L (ref 39–117)
ALT SERPL W P-5'-P-CCNC: 13 U/L (ref 1–41)
ANION GAP SERPL CALCULATED.3IONS-SCNC: 11.9 MMOL/L (ref 5–15)
APTT PPP: 51 SECONDS (ref 26.5–34.5)
ARTERIAL PATENCY WRIST A: POSITIVE
AST SERPL-CCNC: 12 U/L (ref 1–40)
ATMOSPHERIC PRESS: 729 MMHG
BACTERIA UR QL AUTO: ABNORMAL /HPF
BASE EXCESS BLDA CALC-SCNC: -9.1 MMOL/L (ref 0–2)
BASOPHILS # BLD AUTO: 0.01 10*3/MM3 (ref 0–0.2)
BASOPHILS NFR BLD AUTO: 0.1 % (ref 0–1.5)
BDY SITE: ABNORMAL
BILIRUB SERPL-MCNC: 0.3 MG/DL (ref 0–1.2)
BILIRUB UR QL STRIP: NEGATIVE
BUN SERPL-MCNC: 44 MG/DL (ref 6–20)
BUN/CREAT SERPL: 33.1 (ref 7–25)
CALCIUM SPEC-SCNC: 8.8 MG/DL (ref 8.6–10.5)
CHLORIDE SERPL-SCNC: 107 MMOL/L (ref 98–107)
CLARITY UR: ABNORMAL
CO2 BLDA-SCNC: 18.5 MMOL/L (ref 22–33)
CO2 SERPL-SCNC: 18.1 MMOL/L (ref 22–29)
COD CRY URNS QL: ABNORMAL /HPF
COHGB MFR BLD: 1.3 % (ref 0–5)
COLOR UR: YELLOW
CREAT SERPL-MCNC: 1.33 MG/DL (ref 0.76–1.27)
CRP SERPL-MCNC: 22.06 MG/DL (ref 0–0.5)
D-LACTATE SERPL-SCNC: 1.7 MMOL/L (ref 0.5–2)
DEPRECATED RDW RBC AUTO: 62.8 FL (ref 37–54)
EGFRCR SERPLBLD CKD-EPI 2021: 66.8 ML/MIN/1.73
EOSINOPHIL # BLD AUTO: 0.13 10*3/MM3 (ref 0–0.4)
EOSINOPHIL NFR BLD AUTO: 1.1 % (ref 0.3–6.2)
ERYTHROCYTE [DISTWIDTH] IN BLOOD BY AUTOMATED COUNT: 19 % (ref 12.3–15.4)
FLUAV RNA RESP QL NAA+PROBE: NOT DETECTED
FLUBV RNA RESP QL NAA+PROBE: NOT DETECTED
GEN 5 2HR TROPONIN T REFLEX: 79 NG/L
GLOBULIN UR ELPH-MCNC: 4.2 GM/DL
GLUCOSE BLDC GLUCOMTR-MCNC: 99 MG/DL (ref 70–130)
GLUCOSE SERPL-MCNC: 97 MG/DL (ref 65–99)
GLUCOSE UR STRIP-MCNC: NEGATIVE MG/DL
HCO3 BLDA-SCNC: 17.3 MMOL/L (ref 20–26)
HCT VFR BLD AUTO: 29.8 % (ref 37.5–51)
HCT VFR BLD CALC: 26.4 % (ref 38–51)
HGB BLD-MCNC: 8.5 G/DL (ref 13–17.7)
HGB BLDA-MCNC: 8.6 G/DL (ref 14–18)
HGB UR QL STRIP.AUTO: ABNORMAL
HOLD SPECIMEN: NORMAL
HYALINE CASTS UR QL AUTO: ABNORMAL /LPF
IMM GRANULOCYTES # BLD AUTO: 0.05 10*3/MM3 (ref 0–0.05)
IMM GRANULOCYTES NFR BLD AUTO: 0.4 % (ref 0–0.5)
INHALED O2 CONCENTRATION: 50 %
INR PPP: 2.02 (ref 0.9–1.1)
KETONES UR QL STRIP: NEGATIVE
LEUKOCYTE ESTERASE UR QL STRIP.AUTO: ABNORMAL
LYMPHOCYTES # BLD AUTO: 1 10*3/MM3 (ref 0.7–3.1)
LYMPHOCYTES NFR BLD AUTO: 8.6 % (ref 19.6–45.3)
Lab: ABNORMAL
Lab: ABNORMAL
MAGNESIUM SERPL-MCNC: 1.6 MG/DL (ref 1.6–2.6)
MCH RBC QN AUTO: 25.4 PG (ref 26.6–33)
MCHC RBC AUTO-ENTMCNC: 28.5 G/DL (ref 31.5–35.7)
MCV RBC AUTO: 89.2 FL (ref 79–97)
METHGB BLD QL: 0.2 % (ref 0–3)
MODALITY: ABNORMAL
MONOCYTES # BLD AUTO: 0.38 10*3/MM3 (ref 0.1–0.9)
MONOCYTES NFR BLD AUTO: 3.3 % (ref 5–12)
NEUTROPHILS NFR BLD AUTO: 10.07 10*3/MM3 (ref 1.7–7)
NEUTROPHILS NFR BLD AUTO: 86.5 % (ref 42.7–76)
NITRITE UR QL STRIP: NEGATIVE
NOTIFIED BY: ABNORMAL
NOTIFIED WHO: ABNORMAL
NRBC BLD AUTO-RTO: 0 /100 WBC (ref 0–0.2)
OXYHGB MFR BLDV: 96.5 % (ref 94–99)
PCO2 BLDA: 38.9 MM HG (ref 35–45)
PCO2 TEMP ADJ BLD: ABNORMAL MM[HG]
PEEP RESPIRATORY: 5 CM[H2O]
PH BLDA: 7.26 PH UNITS (ref 7.35–7.45)
PH UR STRIP.AUTO: 8.5 [PH] (ref 5–8)
PH, TEMP CORRECTED: ABNORMAL
PLATELET # BLD AUTO: 407 10*3/MM3 (ref 140–450)
PMV BLD AUTO: 9.3 FL (ref 6–12)
PO2 BLDA: 107 MM HG (ref 83–108)
PO2 TEMP ADJ BLD: ABNORMAL MM[HG]
POTASSIUM SERPL-SCNC: 5.7 MMOL/L (ref 3.5–5.2)
PROCALCITONIN SERPL-MCNC: 0.66 NG/ML (ref 0–0.25)
PROT SERPL-MCNC: 6.8 G/DL (ref 6–8.5)
PROT UR QL STRIP: ABNORMAL
PROTHROMBIN TIME: 23.5 SECONDS (ref 12.1–14.7)
RBC # BLD AUTO: 3.34 10*6/MM3 (ref 4.14–5.8)
RBC # UR STRIP: ABNORMAL /HPF
REF LAB TEST METHOD: ABNORMAL
RSV RNA RESP QL NAA+PROBE: NOT DETECTED
SAO2 % BLDCOA: 98 % (ref 94–99)
SARS-COV-2 RNA RESP QL NAA+PROBE: NOT DETECTED
SET MECH RESP RATE: 20
SODIUM SERPL-SCNC: 137 MMOL/L (ref 136–145)
SP GR UR STRIP: 1.01 (ref 1–1.03)
SQUAMOUS #/AREA URNS HPF: ABNORMAL /HPF
TROPONIN T DELTA: -22 NG/L
TROPONIN T SERPL HS-MCNC: 101 NG/L
UROBILINOGEN UR QL STRIP: ABNORMAL
VENTILATOR MODE: ABNORMAL
VT ON VENT VENT: 500 ML
WBC # UR STRIP: ABNORMAL /HPF
WBC NRBC COR # BLD AUTO: 11.64 10*3/MM3 (ref 3.4–10.8)
WHOLE BLOOD HOLD COAG: NORMAL
WHOLE BLOOD HOLD SPECIMEN: NORMAL
YEAST URNS QL MICRO: ABNORMAL /HPF

## 2024-03-02 PROCEDURE — 0 MIDAZOLAM 1 MG/ML 100ML NS 100 MG/100ML SOLUTION: Performed by: EMERGENCY MEDICINE

## 2024-03-02 PROCEDURE — 25810000003 SEPSIS FLUID NS 0.9 % SOLUTION: Performed by: EMERGENCY MEDICINE

## 2024-03-02 PROCEDURE — 96366 THER/PROPH/DIAG IV INF ADDON: CPT

## 2024-03-02 PROCEDURE — 71250 CT THORAX DX C-: CPT | Performed by: RADIOLOGY

## 2024-03-02 PROCEDURE — 80053 COMPREHEN METABOLIC PANEL: CPT | Performed by: EMERGENCY MEDICINE

## 2024-03-02 PROCEDURE — 82805 BLOOD GASES W/O2 SATURATION: CPT

## 2024-03-02 PROCEDURE — 87040 BLOOD CULTURE FOR BACTERIA: CPT | Performed by: EMERGENCY MEDICINE

## 2024-03-02 PROCEDURE — 36600 WITHDRAWAL OF ARTERIAL BLOOD: CPT

## 2024-03-02 PROCEDURE — 94799 UNLISTED PULMONARY SVC/PX: CPT

## 2024-03-02 PROCEDURE — 70450 CT HEAD/BRAIN W/O DYE: CPT

## 2024-03-02 PROCEDURE — 25010000002 PIPERACILLIN SOD-TAZOBACTAM PER 1 G: Performed by: EMERGENCY MEDICINE

## 2024-03-02 PROCEDURE — 71045 X-RAY EXAM CHEST 1 VIEW: CPT

## 2024-03-02 PROCEDURE — 96368 THER/DIAG CONCURRENT INF: CPT

## 2024-03-02 PROCEDURE — 85025 COMPLETE CBC W/AUTO DIFF WBC: CPT | Performed by: EMERGENCY MEDICINE

## 2024-03-02 PROCEDURE — 71250 CT THORAX DX C-: CPT

## 2024-03-02 PROCEDURE — 93010 ELECTROCARDIOGRAM REPORT: CPT | Performed by: INTERNAL MEDICINE

## 2024-03-02 PROCEDURE — 84484 ASSAY OF TROPONIN QUANT: CPT | Performed by: EMERGENCY MEDICINE

## 2024-03-02 PROCEDURE — 87147 CULTURE TYPE IMMUNOLOGIC: CPT | Performed by: EMERGENCY MEDICINE

## 2024-03-02 PROCEDURE — 25810000003 SODIUM CHLORIDE 0.9 % SOLUTION: Performed by: EMERGENCY MEDICINE

## 2024-03-02 PROCEDURE — 36415 COLL VENOUS BLD VENIPUNCTURE: CPT

## 2024-03-02 PROCEDURE — 96365 THER/PROPH/DIAG IV INF INIT: CPT

## 2024-03-02 PROCEDURE — 96367 TX/PROPH/DG ADDL SEQ IV INF: CPT

## 2024-03-02 PROCEDURE — 82375 ASSAY CARBOXYHB QUANT: CPT

## 2024-03-02 PROCEDURE — 74176 CT ABD & PELVIS W/O CONTRAST: CPT

## 2024-03-02 PROCEDURE — 83735 ASSAY OF MAGNESIUM: CPT | Performed by: EMERGENCY MEDICINE

## 2024-03-02 PROCEDURE — 25010000002 VANCOMYCIN 5 G RECONSTITUTED SOLUTION: Performed by: EMERGENCY MEDICINE

## 2024-03-02 PROCEDURE — 87186 SC STD MICRODIL/AGAR DIL: CPT

## 2024-03-02 PROCEDURE — 25010000002 VASOPRESSIN 0.2 UNIT/ML SOLUTION: Performed by: EMERGENCY MEDICINE

## 2024-03-02 PROCEDURE — 85730 THROMBOPLASTIN TIME PARTIAL: CPT | Performed by: EMERGENCY MEDICINE

## 2024-03-02 PROCEDURE — 83050 HGB METHEMOGLOBIN QUAN: CPT

## 2024-03-02 PROCEDURE — 87186 SC STD MICRODIL/AGAR DIL: CPT | Performed by: EMERGENCY MEDICINE

## 2024-03-02 PROCEDURE — 87637 SARSCOV2&INF A&B&RSV AMP PRB: CPT | Performed by: EMERGENCY MEDICINE

## 2024-03-02 PROCEDURE — 83605 ASSAY OF LACTIC ACID: CPT | Performed by: EMERGENCY MEDICINE

## 2024-03-02 PROCEDURE — 84145 PROCALCITONIN (PCT): CPT | Performed by: EMERGENCY MEDICINE

## 2024-03-02 PROCEDURE — 82948 REAGENT STRIP/BLOOD GLUCOSE: CPT

## 2024-03-02 PROCEDURE — 25010000002 SUCCINYLCHOLINE PER 20 MG: Performed by: EMERGENCY MEDICINE

## 2024-03-02 PROCEDURE — 94002 VENT MGMT INPAT INIT DAY: CPT

## 2024-03-02 PROCEDURE — 70450 CT HEAD/BRAIN W/O DYE: CPT | Performed by: RADIOLOGY

## 2024-03-02 PROCEDURE — 81001 URINALYSIS AUTO W/SCOPE: CPT | Performed by: EMERGENCY MEDICINE

## 2024-03-02 PROCEDURE — 87077 CULTURE AEROBIC IDENTIFY: CPT | Performed by: EMERGENCY MEDICINE

## 2024-03-02 PROCEDURE — 86140 C-REACTIVE PROTEIN: CPT | Performed by: EMERGENCY MEDICINE

## 2024-03-02 PROCEDURE — 93005 ELECTROCARDIOGRAM TRACING: CPT | Performed by: EMERGENCY MEDICINE

## 2024-03-02 PROCEDURE — 71045 X-RAY EXAM CHEST 1 VIEW: CPT | Performed by: RADIOLOGY

## 2024-03-02 PROCEDURE — 87154 CUL TYP ID BLD PTHGN 6+ TRGT: CPT | Performed by: EMERGENCY MEDICINE

## 2024-03-02 PROCEDURE — 74176 CT ABD & PELVIS W/O CONTRAST: CPT | Performed by: RADIOLOGY

## 2024-03-02 PROCEDURE — C1751 CATH, INF, PER/CENT/MIDLINE: HCPCS

## 2024-03-02 PROCEDURE — 99285 EMERGENCY DEPT VISIT HI MDM: CPT

## 2024-03-02 PROCEDURE — 31500 INSERT EMERGENCY AIRWAY: CPT

## 2024-03-02 PROCEDURE — 85610 PROTHROMBIN TIME: CPT | Performed by: EMERGENCY MEDICINE

## 2024-03-02 RX ORDER — SUCCINYLCHOLINE CHLORIDE 20 MG/ML
100 INJECTION INTRAMUSCULAR; INTRAVENOUS ONCE
Status: COMPLETED | OUTPATIENT
Start: 2024-03-02 | End: 2024-03-02

## 2024-03-02 RX ORDER — NOREPINEPHRINE BITARTRATE 0.03 MG/ML
INJECTION, SOLUTION INTRAVENOUS
Status: COMPLETED
Start: 2024-03-02 | End: 2024-03-03

## 2024-03-02 RX ORDER — ETOMIDATE 2 MG/ML
10 INJECTION INTRAVENOUS ONCE
Status: COMPLETED | OUTPATIENT
Start: 2024-03-02 | End: 2024-03-02

## 2024-03-02 RX ORDER — SODIUM CHLORIDE 9 MG/ML
125 INJECTION, SOLUTION INTRAVENOUS CONTINUOUS
Status: DISCONTINUED | OUTPATIENT
Start: 2024-03-02 | End: 2024-03-03 | Stop reason: HOSPADM

## 2024-03-02 RX ORDER — NOREPINEPHRINE BITARTRATE 0.03 MG/ML
INJECTION, SOLUTION INTRAVENOUS
Status: COMPLETED
Start: 2024-03-02 | End: 2024-03-02

## 2024-03-02 RX ORDER — SODIUM CHLORIDE 0.9 % (FLUSH) 0.9 %
10 SYRINGE (ML) INJECTION AS NEEDED
Status: DISCONTINUED | OUTPATIENT
Start: 2024-03-02 | End: 2024-03-03 | Stop reason: HOSPADM

## 2024-03-02 RX ORDER — MIDAZOLAM IN NACL,ISO-OSMOT/PF 50 MG/50ML
1-10 INFUSION BOTTLE (ML) INTRAVENOUS
Status: DISCONTINUED | OUTPATIENT
Start: 2024-03-02 | End: 2024-03-03 | Stop reason: HOSPADM

## 2024-03-02 RX ADMIN — SUCCINYLCHOLINE CHLORIDE 100 MG: 20 INJECTION, SOLUTION INTRAMUSCULAR; INTRAVENOUS at 19:30

## 2024-03-02 RX ADMIN — VASOPRESSIN 0.03 UNITS/MIN: 0.2 INJECTION INTRAVENOUS at 20:57

## 2024-03-02 RX ADMIN — PIPERACILLIN SODIUM AND TAZOBACTAM SODIUM 3.38 G: 3; .375 INJECTION, POWDER, LYOPHILIZED, FOR SOLUTION INTRAVENOUS at 20:29

## 2024-03-02 RX ADMIN — MIDAZOLAM HYDROCHLORIDE 1 MG/HR: 1 INJECTION, SOLUTION INTRAVENOUS at 22:12

## 2024-03-02 RX ADMIN — ETOMIDATE 10 MG: 2 INJECTION, SOLUTION INTRAVENOUS at 19:30

## 2024-03-02 RX ADMIN — Medication 0.3 MCG/KG/MIN: at 23:30

## 2024-03-02 RX ADMIN — VANCOMYCIN HYDROCHLORIDE 2500 MG: 5 INJECTION, POWDER, LYOPHILIZED, FOR SOLUTION INTRAVENOUS at 20:35

## 2024-03-02 RX ADMIN — SODIUM CHLORIDE 2952 ML: 9 INJECTION, SOLUTION INTRAVENOUS at 19:25

## 2024-03-02 RX ADMIN — Medication: at 19:32

## 2024-03-02 RX ADMIN — SODIUM CHLORIDE 125 ML/HR: 9 INJECTION, SOLUTION INTRAVENOUS at 22:12

## 2024-03-03 VITALS
HEART RATE: 77 BPM | RESPIRATION RATE: 20 BRPM | HEIGHT: 71 IN | BODY MASS INDEX: 41.16 KG/M2 | SYSTOLIC BLOOD PRESSURE: 102 MMHG | DIASTOLIC BLOOD PRESSURE: 57 MMHG | TEMPERATURE: 99.5 F | OXYGEN SATURATION: 99 % | WEIGHT: 294 LBS

## 2024-03-03 LAB
BACTERIA BLD CULT: ABNORMAL
BACTERIA ID TEST ISLT QL CULT: ABNORMAL
BOTTLE TYPE: ABNORMAL
QT INTERVAL: 484 MS
QTC INTERVAL: 499 MS

## 2024-03-03 PROCEDURE — 25010000002 SUCCINYLCHOLINE PER 20 MG

## 2024-03-03 PROCEDURE — 71045 X-RAY EXAM CHEST 1 VIEW: CPT | Performed by: RADIOLOGY

## 2024-03-03 PROCEDURE — 94799 UNLISTED PULMONARY SVC/PX: CPT

## 2024-03-03 RX ORDER — NOREPINEPHRINE BITARTRATE 0.03 MG/ML
.02-.3 INJECTION, SOLUTION INTRAVENOUS
Status: DISCONTINUED | OUTPATIENT
Start: 2024-03-03 | End: 2024-03-03 | Stop reason: HOSPADM

## 2024-03-03 NOTE — ED NOTES
[facility] called back. He accepted transfer, but states it will be a little while due to trucks on current runs.

## 2024-03-03 NOTE — ED NOTES
[facility] declined transfer due to only 2 trucks for the [facility].

## 2024-03-03 NOTE — ED NOTES
Called [facility] for ALS transfer to [facility]. He will have [facility] call me.
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
## 2024-03-03 NOTE — ED NOTES
Called [facility] dispatch spoke Franchesca for ALS transfer. She will have [facility] call me.

## 2024-03-03 NOTE — ED NOTES
Alicia called with [facility]. Patient is going to [facility] ICU. Number for report is [phone].

## 2024-03-03 NOTE — ED NOTES
"Called [facility] service requesting update on possible transfer. [facility] Stated \" I can't accept the transfer. I have 5 pending transfers from [facility].\" Nurse aware.  "

## 2024-03-03 NOTE — ED NOTES
[facility] with [facility] called back. He accepted transfer and will send the truck.

## 2024-03-03 NOTE — ED NOTES
Called [facility] dispatch destiny Rivera for ALS transfer to [facility]. She will have [facility] call me back.

## 2024-03-03 NOTE — ED NOTES
Called [facility] spoke with Alicia for transfer. They have a bed a [facility]. She will call me back with the hospitalist shortly.

## 2024-03-04 ENCOUNTER — ANESTHESIA (OUTPATIENT)
Dept: PERIOP | Facility: HOSPITAL | Age: 49
End: 2024-03-04

## 2024-03-08 LAB
BACTERIA SPEC AEROBE CULT: ABNORMAL
GRAM STN SPEC: ABNORMAL
ISOLATED FROM: ABNORMAL

## 2024-03-13 LAB
BACTERIA SPEC AEROBE CULT: ABNORMAL
BACTERIA SPEC AEROBE CULT: ABNORMAL
GRAM STN SPEC: ABNORMAL
ISOLATED FROM: ABNORMAL
ISOLATED FROM: ABNORMAL

## (undated) DEVICE — RADIFOCUS OPTITORQUE ANGIOGRAPHIC CATHETER: Brand: OPTITORQUE

## (undated) DEVICE — PK CATH CARD 70

## (undated) DEVICE — DRSNG SURESITE WNDW 4X4.5

## (undated) DEVICE — 3M™ MEDIPORE™ H SOFT CLOTH SURGICAL TAPE, 2863, 3 IN X 10 YD, 12/CASE: Brand: 3M™ MEDIPORE™

## (undated) DEVICE — UNDERGLV SURG BIOGEL INDICAT PF 8 GRN

## (undated) DEVICE — HOLDER: Brand: DEROYAL

## (undated) DEVICE — DRSNG PAD ABD 8X10IN STRL

## (undated) DEVICE — RUNWAY RADL W/TOP PAD

## (undated) DEVICE — DRAPE,UTILTY,TAPE,15X26, 4EA/PK: Brand: MEDLINE

## (undated) DEVICE — DBD-DRAPE,LAP,CHOLE,W/TROUGHS,STERILE: Brand: MEDLINE

## (undated) DEVICE — TBG PENCL TELESCP MEGADYNE SMOKE EVAC 10FT

## (undated) DEVICE — PK BASIC 70

## (undated) DEVICE — BANDAGE,GAUZE,BULKEE II,2.25"X3YD,STRL: Brand: MEDLINE

## (undated) DEVICE — DRAPE,EXTREMITY,89X128,STERILE: Brand: MEDLINE

## (undated) DEVICE — MODEL AT P65, P/N 701554-001KIT CONTENTS: HAND CONTROLLER, 3-WAY HIGH-PRESSURE STOPCOCK WITH ROTATING END AND PREMIUM HIGH-PRESSURE TUBING: Brand: ANGIOTOUCH® KIT

## (undated) DEVICE — PAD, DEFIB, ADULT, RADIOTRANS, ZOLL: Brand: MEDLINE

## (undated) DEVICE — CATH VENT MIV RADL PIG LNG TIP 5F 110CM

## (undated) DEVICE — BANDAGE,GAUZE,BULKEE II,4.5"X4.1YD,STRL: Brand: MEDLINE

## (undated) DEVICE — GLV SURG PREMIERPRO MIC LTX PF SZ7.5 BRN

## (undated) DEVICE — LN FLTR ORL/NASL MICROSTREAM NONINTUB A/LNG

## (undated) DEVICE — PREMIUM WET SKIN PREP TRAY: Brand: MEDLINE INDUSTRIES, INC.

## (undated) DEVICE — TR BAND RADIAL ARTERY COMPRESSION DEVICE: Brand: TR BAND

## (undated) DEVICE — A2000 MULTI-USE SYRINGE KIT, P/N 701277-003KIT CONTENTS: 100ML CONTRAST RESERVOIR AND TUBING WITH CONTRAST SPIKE AND CLAMP: Brand: A2000 MULTI-USE SYRINGE KIT

## (undated) DEVICE — CATH F5 INF JR 4 100CM: Brand: INFINITI

## (undated) DEVICE — GW INQW FIX/CORE PTFE J/3MM .035 260CM

## (undated) DEVICE — TRY SKINPREP PVP SCRB W PAINT

## (undated) DEVICE — DRAPE, RADIAL, STERILE: Brand: MEDLINE

## (undated) DEVICE — APPL CHLORAPREP HI/LITE 26ML ORNG

## (undated) DEVICE — ADULT DISPOSABLE SINGLE-PATIENT USE PULSE OXIMETER SENSOR: Brand: NONIN

## (undated) DEVICE — SPNG GZ STRL 2S 4X4 12PLY

## (undated) DEVICE — ST INF PRI SMRTSTE 20DRP 2VLV 24ML 117

## (undated) DEVICE — PATIENT RETURN ELECTRODE, SINGLE-USE, CONTACT QUALITY MONITORING, ADULT, WITH 9FT CORD, FOR PATIENTS WEIGING OVER 33LBS. (15KG): Brand: MEGADYNE

## (undated) DEVICE — GLIDESHEATH SLENDER STAINLESS STEEL KIT: Brand: GLIDESHEATH SLENDER

## (undated) DEVICE — ENCORE® LATEX MICRO SIZE 7.5, STERILE LATEX POWDER-FREE SURGICAL GLOVE: Brand: ENCORE

## (undated) DEVICE — ST EXT IV SMARTSITE 2VLV SP M LL 5ML IV1